# Patient Record
Sex: MALE | Race: WHITE | NOT HISPANIC OR LATINO | URBAN - METROPOLITAN AREA
[De-identification: names, ages, dates, MRNs, and addresses within clinical notes are randomized per-mention and may not be internally consistent; named-entity substitution may affect disease eponyms.]

---

## 2017-01-20 ENCOUNTER — OUTPATIENT (OUTPATIENT)
Dept: OUTPATIENT SERVICES | Facility: HOSPITAL | Age: 62
LOS: 1 days | End: 2017-01-20
Payer: COMMERCIAL

## 2017-01-20 ENCOUNTER — APPOINTMENT (OUTPATIENT)
Dept: CT IMAGING | Facility: CLINIC | Age: 62
End: 2017-01-20

## 2017-01-20 DIAGNOSIS — Z00.8 ENCOUNTER FOR OTHER GENERAL EXAMINATION: ICD-10-CM

## 2017-01-20 PROCEDURE — 74178 CT ABD&PLV WO CNTR FLWD CNTR: CPT

## 2017-01-20 PROCEDURE — 82565 ASSAY OF CREATININE: CPT

## 2017-05-24 ENCOUNTER — APPOINTMENT (OUTPATIENT)
Dept: ELECTROPHYSIOLOGY | Facility: CLINIC | Age: 62
End: 2017-05-24

## 2017-07-05 ENCOUNTER — RX RENEWAL (OUTPATIENT)
Age: 62
End: 2017-07-05

## 2017-08-07 ENCOUNTER — RX RENEWAL (OUTPATIENT)
Age: 62
End: 2017-08-07

## 2017-08-17 ENCOUNTER — RX RENEWAL (OUTPATIENT)
Age: 62
End: 2017-08-17

## 2017-08-23 ENCOUNTER — RX RENEWAL (OUTPATIENT)
Age: 62
End: 2017-08-23

## 2018-03-19 ENCOUNTER — RX RENEWAL (OUTPATIENT)
Age: 63
End: 2018-03-19

## 2019-08-01 RX ADMIN — Medication 650 MILLIGRAM(S): at 21:13

## 2019-08-16 RX ORDER — AZITHROMYCIN 500 MG/1
1 TABLET, FILM COATED ORAL
Qty: 0 | Refills: 0 | DISCHARGE
Start: 2019-08-16 | End: 2019-08-22

## 2019-08-18 ENCOUNTER — INPATIENT (INPATIENT)
Facility: HOSPITAL | Age: 64
LOS: 1 days | Discharge: HOME | DRG: 204 | End: 2019-08-20
Attending: GENERAL ACUTE CARE HOSPITAL | Admitting: GENERAL ACUTE CARE HOSPITAL
Payer: COMMERCIAL

## 2019-08-18 VITALS
WEIGHT: 149.03 LBS | HEIGHT: 66 IN | TEMPERATURE: 98 F | RESPIRATION RATE: 18 BRPM | OXYGEN SATURATION: 99 % | HEART RATE: 110 BPM | DIASTOLIC BLOOD PRESSURE: 80 MMHG | SYSTOLIC BLOOD PRESSURE: 137 MMHG

## 2019-08-18 DIAGNOSIS — I48.91 UNSPECIFIED ATRIAL FIBRILLATION: ICD-10-CM

## 2019-08-18 DIAGNOSIS — E11.9 TYPE 2 DIABETES MELLITUS WITHOUT COMPLICATIONS: ICD-10-CM

## 2019-08-18 DIAGNOSIS — I25.10 ATHEROSCLEROTIC HEART DISEASE OF NATIVE CORONARY ARTERY WITHOUT ANGINA PECTORIS: ICD-10-CM

## 2019-08-18 DIAGNOSIS — I10 ESSENTIAL (PRIMARY) HYPERTENSION: ICD-10-CM

## 2019-08-18 DIAGNOSIS — R06.02 SHORTNESS OF BREATH: ICD-10-CM

## 2019-08-18 DIAGNOSIS — E78.5 HYPERLIPIDEMIA, UNSPECIFIED: ICD-10-CM

## 2019-08-18 DIAGNOSIS — R05 COUGH: ICD-10-CM

## 2019-08-18 LAB
ALBUMIN SERPL ELPH-MCNC: 4.4 G/DL — SIGNIFICANT CHANGE UP (ref 3.3–5)
ALP SERPL-CCNC: 158 U/L — HIGH (ref 40–120)
ALT FLD-CCNC: 48 U/L — HIGH (ref 10–45)
ANION GAP SERPL CALC-SCNC: 12 MMOL/L — SIGNIFICANT CHANGE UP (ref 5–17)
AST SERPL-CCNC: 50 U/L — HIGH (ref 10–40)
BASE EXCESS BLDV CALC-SCNC: 0.6 MMOL/L — SIGNIFICANT CHANGE UP (ref -2–2)
BASOPHILS # BLD AUTO: 0.1 K/UL — SIGNIFICANT CHANGE UP (ref 0–0.2)
BASOPHILS NFR BLD AUTO: 0.4 % — SIGNIFICANT CHANGE UP (ref 0–2)
BILIRUB SERPL-MCNC: 3.2 MG/DL — HIGH (ref 0.2–1.2)
BUN SERPL-MCNC: 17 MG/DL — SIGNIFICANT CHANGE UP (ref 7–23)
CA-I SERPL-SCNC: 1.18 MMOL/L — SIGNIFICANT CHANGE UP (ref 1.12–1.3)
CALCIUM SERPL-MCNC: 10 MG/DL — SIGNIFICANT CHANGE UP (ref 8.4–10.5)
CHLORIDE BLDV-SCNC: 105 MMOL/L — SIGNIFICANT CHANGE UP (ref 96–108)
CHLORIDE SERPL-SCNC: 99 MMOL/L — SIGNIFICANT CHANGE UP (ref 96–108)
CO2 BLDV-SCNC: 28 MMOL/L — SIGNIFICANT CHANGE UP (ref 22–30)
CO2 SERPL-SCNC: 22 MMOL/L — SIGNIFICANT CHANGE UP (ref 22–31)
CREAT SERPL-MCNC: 1.23 MG/DL — SIGNIFICANT CHANGE UP (ref 0.5–1.3)
EOSINOPHIL # BLD AUTO: 0.4 K/UL — SIGNIFICANT CHANGE UP (ref 0–0.5)
EOSINOPHIL NFR BLD AUTO: 2.5 % — SIGNIFICANT CHANGE UP (ref 0–6)
GAS PNL BLDV: 128 MMOL/L — LOW (ref 135–145)
GAS PNL BLDV: SIGNIFICANT CHANGE UP
GAS PNL BLDV: SIGNIFICANT CHANGE UP
GLUCOSE BLDV-MCNC: 322 MG/DL — HIGH (ref 70–99)
GLUCOSE SERPL-MCNC: 328 MG/DL — HIGH (ref 70–99)
HCO3 BLDV-SCNC: 27 MMOL/L — SIGNIFICANT CHANGE UP (ref 21–29)
HCT VFR BLD CALC: 40.9 % — SIGNIFICANT CHANGE UP (ref 39–50)
HCT VFR BLDA CALC: 55 % — HIGH (ref 39–50)
HGB BLD CALC-MCNC: 17.9 G/DL — HIGH (ref 13–17)
HGB BLD-MCNC: 13.6 G/DL — SIGNIFICANT CHANGE UP (ref 13–17)
LACTATE BLDV-MCNC: 2.3 MMOL/L — HIGH (ref 0.7–2)
LYMPHOCYTES # BLD AUTO: 12.9 % — LOW (ref 13–44)
LYMPHOCYTES # BLD AUTO: 2.1 K/UL — SIGNIFICANT CHANGE UP (ref 1–3.3)
MCHC RBC-ENTMCNC: 30.5 PG — SIGNIFICANT CHANGE UP (ref 27–34)
MCHC RBC-ENTMCNC: 33.3 GM/DL — SIGNIFICANT CHANGE UP (ref 32–36)
MCV RBC AUTO: 91.5 FL — SIGNIFICANT CHANGE UP (ref 80–100)
MONOCYTES # BLD AUTO: 0.9 K/UL — SIGNIFICANT CHANGE UP (ref 0–0.9)
MONOCYTES NFR BLD AUTO: 5.6 % — SIGNIFICANT CHANGE UP (ref 2–14)
NEUTROPHILS # BLD AUTO: 12.5 K/UL — HIGH (ref 1.8–7.4)
NEUTROPHILS NFR BLD AUTO: 78.6 % — HIGH (ref 43–77)
PCO2 BLDV: 49 MMHG — SIGNIFICANT CHANGE UP (ref 35–50)
PH BLDV: 7.35 — SIGNIFICANT CHANGE UP (ref 7.35–7.45)
PLATELET # BLD AUTO: 315 K/UL — SIGNIFICANT CHANGE UP (ref 150–400)
PO2 BLDV: <20 MMHG — LOW (ref 25–45)
POTASSIUM BLDV-SCNC: 4.1 MMOL/L — SIGNIFICANT CHANGE UP (ref 3.5–5.3)
POTASSIUM SERPL-MCNC: 4.6 MMOL/L — SIGNIFICANT CHANGE UP (ref 3.5–5.3)
POTASSIUM SERPL-SCNC: 4.6 MMOL/L — SIGNIFICANT CHANGE UP (ref 3.5–5.3)
PROT SERPL-MCNC: 7.3 G/DL — SIGNIFICANT CHANGE UP (ref 6–8.3)
RAPID RVP RESULT: SIGNIFICANT CHANGE UP
RBC # BLD: 4.47 M/UL — SIGNIFICANT CHANGE UP (ref 4.2–5.8)
RBC # FLD: 12.8 % — SIGNIFICANT CHANGE UP (ref 10.3–14.5)
SAO2 % BLDV: 11 % — LOW (ref 67–88)
SODIUM SERPL-SCNC: 133 MMOL/L — LOW (ref 135–145)
TROPONIN T, HIGH SENSITIVITY RESULT: 22 NG/L — SIGNIFICANT CHANGE UP (ref 0–51)
WBC # BLD: 16 K/UL — HIGH (ref 3.8–10.5)
WBC # FLD AUTO: 16 K/UL — HIGH (ref 3.8–10.5)

## 2019-08-18 PROCEDURE — 99285 EMERGENCY DEPT VISIT HI MDM: CPT

## 2019-08-18 PROCEDURE — 93010 ELECTROCARDIOGRAM REPORT: CPT

## 2019-08-18 PROCEDURE — 99223 1ST HOSP IP/OBS HIGH 75: CPT

## 2019-08-18 PROCEDURE — 71046 X-RAY EXAM CHEST 2 VIEWS: CPT | Mod: 26

## 2019-08-18 RX ORDER — LISINOPRIL 2.5 MG/1
10 TABLET ORAL DAILY
Refills: 0 | Status: DISCONTINUED | OUTPATIENT
Start: 2019-08-18 | End: 2019-08-20

## 2019-08-18 RX ORDER — DEXTROSE 50 % IN WATER 50 %
25 SYRINGE (ML) INTRAVENOUS ONCE
Refills: 0 | Status: DISCONTINUED | OUTPATIENT
Start: 2019-08-18 | End: 2019-08-20

## 2019-08-18 RX ORDER — CODEINE SULFATE 60 MG/1
7.5 TABLET ORAL EVERY 6 HOURS
Refills: 0 | Status: DISCONTINUED | OUTPATIENT
Start: 2019-08-18 | End: 2019-08-20

## 2019-08-18 RX ORDER — GLUCAGON INJECTION, SOLUTION 0.5 MG/.1ML
1 INJECTION, SOLUTION SUBCUTANEOUS ONCE
Refills: 0 | Status: DISCONTINUED | OUTPATIENT
Start: 2019-08-18 | End: 2019-08-20

## 2019-08-18 RX ORDER — METOPROLOL TARTRATE 50 MG
25 TABLET ORAL
Refills: 0 | Status: DISCONTINUED | OUTPATIENT
Start: 2019-08-18 | End: 2019-08-20

## 2019-08-18 RX ORDER — DEXTROMETHORPHAN POLISTIREX 30 MG/5 ML
10 SUSPENSION, EXTENDED RELEASE 12 HR ORAL ONCE
Refills: 0 | Status: DISCONTINUED | OUTPATIENT
Start: 2019-08-18 | End: 2019-08-18

## 2019-08-18 RX ORDER — ACETAMINOPHEN 500 MG
650 TABLET ORAL EVERY 6 HOURS
Refills: 0 | Status: DISCONTINUED | OUTPATIENT
Start: 2019-08-18 | End: 2019-08-20

## 2019-08-18 RX ORDER — DEXTROSE 50 % IN WATER 50 %
12.5 SYRINGE (ML) INTRAVENOUS ONCE
Refills: 0 | Status: DISCONTINUED | OUTPATIENT
Start: 2019-08-18 | End: 2019-08-20

## 2019-08-18 RX ORDER — GUAIFENESIN/DEXTROMETHORPHAN 600MG-30MG
10 TABLET, EXTENDED RELEASE 12 HR ORAL ONCE
Refills: 0 | Status: COMPLETED | OUTPATIENT
Start: 2019-08-18 | End: 2019-08-18

## 2019-08-18 RX ORDER — TICAGRELOR 90 MG/1
60 TABLET ORAL EVERY 12 HOURS
Refills: 0 | Status: DISCONTINUED | OUTPATIENT
Start: 2019-08-19 | End: 2019-08-20

## 2019-08-18 RX ORDER — DEXTROSE 50 % IN WATER 50 %
15 SYRINGE (ML) INTRAVENOUS ONCE
Refills: 0 | Status: DISCONTINUED | OUTPATIENT
Start: 2019-08-18 | End: 2019-08-20

## 2019-08-18 RX ORDER — INSULIN GLARGINE 100 [IU]/ML
25 INJECTION, SOLUTION SUBCUTANEOUS AT BEDTIME
Refills: 0 | Status: DISCONTINUED | OUTPATIENT
Start: 2019-08-18 | End: 2019-08-20

## 2019-08-18 RX ORDER — ATORVASTATIN CALCIUM 80 MG/1
20 TABLET, FILM COATED ORAL AT BEDTIME
Refills: 0 | Status: DISCONTINUED | OUTPATIENT
Start: 2019-08-18 | End: 2019-08-20

## 2019-08-18 RX ORDER — INSULIN LISPRO 100/ML
VIAL (ML) SUBCUTANEOUS
Refills: 0 | Status: DISCONTINUED | OUTPATIENT
Start: 2019-08-18 | End: 2019-08-20

## 2019-08-18 RX ORDER — VANCOMYCIN HCL 1 G
1000 VIAL (EA) INTRAVENOUS ONCE
Refills: 0 | Status: COMPLETED | OUTPATIENT
Start: 2019-08-18 | End: 2019-08-18

## 2019-08-18 RX ORDER — CEFTRIAXONE 500 MG/1
1000 INJECTION, POWDER, FOR SOLUTION INTRAMUSCULAR; INTRAVENOUS ONCE
Refills: 0 | Status: COMPLETED | OUTPATIENT
Start: 2019-08-18 | End: 2019-08-18

## 2019-08-18 RX ADMIN — CEFTRIAXONE 100 MILLIGRAM(S): 500 INJECTION, POWDER, FOR SOLUTION INTRAMUSCULAR; INTRAVENOUS at 19:24

## 2019-08-18 RX ADMIN — INSULIN GLARGINE 25 UNIT(S): 100 INJECTION, SOLUTION SUBCUTANEOUS at 22:59

## 2019-08-18 RX ADMIN — Medication 100 MILLIGRAM(S): at 23:00

## 2019-08-18 RX ADMIN — ATORVASTATIN CALCIUM 20 MILLIGRAM(S): 80 TABLET, FILM COATED ORAL at 23:00

## 2019-08-18 RX ADMIN — Medication 250 MILLIGRAM(S): at 20:16

## 2019-08-18 RX ADMIN — Medication 10 MILLILITER(S): at 17:39

## 2019-08-18 NOTE — H&P ADULT - PROBLEM SELECTOR PLAN 2
-Symptomatic treatment with standing Benzonatate and PRN Robitussin and Codeine  -Hold ABX for now, send Procalcitonin and if elevated would consider Augmentin vs. Ceftriaxone  -Trend WBC and monitor fever curve -Symptomatic treatment with standing Benzonatate and PRN Robitussin and Codeine  -Hold ABX for now, send Procalcitonin and if elevated would consider Augmentin vs. Ceftriaxone  -Trend WBC and monitor fever curve  -Send RVP  -Don't suspect drug induced cough since patient is not taking his ACEI at home, if cough goes on for weeks would consider Pertussis, at this point it's most likely viral in etiology

## 2019-08-18 NOTE — ED PROVIDER NOTE - NS ED ROS FT
CONSTITUTIONAL: + fevers, no chills, no lightheadedness, no dizziness  Eyes: no visual changes  Nose: no nasal congestion  Mouth/Throat: no sore throat  CV: No chest pain, no palpitations  PULM: +cough, SOB  GI: No n/v/d, no abd pain  : no dysuria, no hematuria  NEURO: no headache, no focal weakness or numbness

## 2019-08-18 NOTE — ED PROVIDER NOTE - CLINICAL SUMMARY MEDICAL DECISION MAKING FREE TEXT BOX
63 y/o M with PMH of CAD, HTN, HLD, and new onset afib presenting for cough x1week that did not resolve with azithromycin. Concern for pneumonia vs viral etiology. New onset afib concerning, r/o ischemia

## 2019-08-18 NOTE — ED ADULT NURSE NOTE - PMH
CAD in native artery    Carotid Stenosis  left  Essential hypertension, hypertension with unspecified goal    Former smoker    Hyperlipidemia, unspecified hyperlipidemia type    Neuropathy  b/l feet  Type 2 diabetes mellitus

## 2019-08-18 NOTE — ED ADULT NURSE NOTE - OBJECTIVE STATEMENT
pt c/o "cough for the past week that has gotten progressively worse. I went to the doctor and they gave me abx to take and medication for the cough. last night the cough got worse and I wasn't even able to sleep. now when I go up stairs, I feel winded and SOB" pt denies any lightheadedness, dizziness, chest pain, SOB while at rest, abd. pain, n/v/d, numbness/tingling at present. pt has persistent cough at present, but able to speak in full complete sentences at present.

## 2019-08-18 NOTE — H&P ADULT - ASSESSMENT
64M with PMHx of CAD (post-three stents), HTN, HLD, T2DM (complicated by peripheral neuropathy and newly diagnosed atrial fibrillation presents to [facility] with cough for approximately five days. Patient did not improve with Zithromax and has no complaints which would indicate a systemic infection. Patient specifically denies shortness of breath at rest and any URI likely symptoms. Exam notable for clear lungs and normal O2 saturation on room air with no tachypnea. Patient does have a leukocytosis of 16 (neutrophilic pre-dominant) and two lateral troponins of 25 and 22. Patient's CXR showed no consolidation and EKG documented by ED physician to have new TWI in V4-V6. When interpreted by me, TWI are not very clear and his last EKG I could find in the system was from 2016. Patient admitted for possible ACS as opposed to cough.

## 2019-08-18 NOTE — H&P ADULT - NSICDXPASTMEDICALHX_GEN_ALL_CORE_FT
PAST MEDICAL HISTORY:  CAD in native artery     Essential hypertension, hypertension with unspecified goal     Former smoker     Hyperlipidemia, unspecified hyperlipidemia type     Type 2 diabetes mellitus

## 2019-08-18 NOTE — H&P ADULT - HISTORY OF PRESENT ILLNESS
64M with PMHx of CAD (post-three stents), HTN, HLD, T2DM (complicated by peripheral neuropathy and newly diagnosed atrial fibrillation presents to [facility] with cough for approximately five days. Patient stated the cough started approximately one week prior to admission when he was outside in the rain without an umbrella. Shortly there after he developed a productive cough (he describes coughing up phlegm which is whitish and periodically has brown specks). The cough has not improved since then and in fact worsened yesterday. He went to his PMD who gave him a course of Azithromycin which did not improve his cough. He has been taking Robitussin and possibly Tessalon Perles which may have slightly relieved his cough. Patient denies nausea, vomiting, fever, chills, sore throat, nasal congestion, rhinorrhea, shortness of breath at rest, sick contacts, dysuria, or diarrhea. He does endorse decreased exercise tolerance (can walk a few feet before he has to stop to catch his breath), but no chest pain during these events.     Patient admitted because of possible TWI in lateral leads (V4-V6) when compared to prior EKGs. Patient last had EKG on record in 2016 and the EKG reviewed by me today shows possible TWI, but not really well appreciated, but may be considered flattened. Patient has no active chest pain or diaphoresis. His last cardiac cath was in 2016 and per him was also when he had his last stent. He has been compliant with his medications including Brilinta. He recently stopped Aspirin after being started on Xarelto for atrial fibrillation. When seen by me in the ED patient states cough is much improved.

## 2019-08-18 NOTE — H&P ADULT - PROBLEM SELECTOR PLAN 3
-Reduce Glargine by 20% to 25 units qhs  -ISS with FS TID AC  -Hold home oral agents, restart on discharge  -Patient told he shouldn't take a Sulfonylurea such as Glimepiride due to redundancy and increase risk of hypoglycemic events

## 2019-08-18 NOTE — PROGRESS NOTE ADULT - SUBJECTIVE AND OBJECTIVE BOX
Patient is a 64y old  Male who presents with a chief complaint of Cough, EKG Changes? (18 Aug 2019 20:50)                                                               INTERVAL HPI/OVERNIGHT EVENTS:    REVIEW OF SYSTEMS:     CONSTITUTIONAL: No weakness, fevers or chills  EYES/ENT: No visual changes , no ear ache   NECK: No pain or stiffness  RESPIRATORY: No cough, wheezing,  No shortness of breath  CARDIOVASCULAR: No chest pain or palpitations  GASTROINTESTINAL: No abdominal pain  . No nausea, vomiting, or hematemesis; No diarrhea or constipation. No melena or hematochezia.  GENITOURINARY: No dysuria, frequency or hematuria  NEUROLOGICAL: No numbness or weakness  SKIN: No itching, burning, rashes, or lesions                                                                                                                                                                                                                                                                                 Medications:  MEDICATIONS  (STANDING):  atorvastatin 20 milliGRAM(s) Oral at bedtime  benzonatate 100 milliGRAM(s) Oral every 8 hours  dextrose 50% Injectable 12.5 Gram(s) IV Push once  dextrose 50% Injectable 25 Gram(s) IV Push once  dextrose 50% Injectable 25 Gram(s) IV Push once  insulin glargine Injectable (LANTUS) 25 Unit(s) SubCutaneous at bedtime  insulin lispro (HumaLOG) corrective regimen sliding scale   SubCutaneous three times a day before meals  lisinopril 10 milliGRAM(s) Oral daily  metoprolol tartrate 25 milliGRAM(s) Oral two times a day    MEDICATIONS  (PRN):  acetaminophen   Tablet .. 650 milliGRAM(s) Oral every 6 hours PRN Mild Pain (1 - 3), Moderate Pain (4 - 6)  codeine 7.5 milliGRAM(s) Oral every 6 hours PRN Cough  dextrose 40% Gel 15 Gram(s) Oral once PRN Blood Glucose LESS THAN 70 milliGRAM(s)/deciliter  glucagon  Injectable 1 milliGRAM(s) IntraMuscular once PRN Glucose LESS THAN 70 milligrams/deciliter  guaiFENesin    Syrup 200 milliGRAM(s) Oral every 6 hours PRN Cough       Allergies    No Known Allergies    Intolerances      Vital Signs Last 24 Hrs  T(C): 37.1 (18 Aug 2019 20:51), Max: 37.1 (18 Aug 2019 20:51)  T(F): 98.7 (18 Aug 2019 20:51), Max: 98.7 (18 Aug 2019 20:51)  HR: 98 (18 Aug 2019 20:51) (98 - 110)  BP: 143/78 (18 Aug 2019 20:51) (137/80 - 143/78)  BP(mean): --  RR: 20 (18 Aug 2019 20:51) (18 - 20)  SpO2: 96% (18 Aug 2019 20:51) (96% - 99%)  CAPILLARY BLOOD GLUCOSE          Physical Exam:    Daily Height in cm: 167.64 (18 Aug 2019 16:09)    Daily   General:  Well appearing, NAD, not cachetic  HEENT:  Nonicteric, PERRLA  CV:  RRR, S1S2   Lungs:  CTA B/L, no wheezes, rales, rhonchi  Abdomen:  Soft, non-tender, no distended, positive BS  Extremities:  2+ pulses, no c/c, no edema  Skin:  Warm and dry, no rashes  :  No moreno  Neuro:  AAOx3, non-focal, grossly intact                                                                                                                                                                                                                                                                                                LABS:                               13.6   16.0  )-----------( 315      ( 18 Aug 2019 17:23 )             40.9                      08-18    133<L>  |  99  |  17  ----------------------------<  328<H>  4.6   |  22  |  1.23    Ca    10.0      18 Aug 2019 17:23    TPro  7.3  /  Alb  4.4  /  TBili  3.2<H>  /  DBili  x   /  AST  50<H>  /  ALT  48<H>  /  AlkPhos  158<H>  08-18                       RADIOLOGY & ADDITIONAL TESTS         I personally reviewed: [  ]EKG   [  ]CXR    [  ] CT      A/P:         Discussed with :     Derek consultants' Notes   Time spent :

## 2019-08-18 NOTE — ED PROVIDER NOTE - ST/T WAVE
nonspecific ST and T wave changes seen on previous EKG nonspecific ST and T wave changes in lateral leads ST depression V4-6 1mm, new compared to prior.

## 2019-08-18 NOTE — H&P ADULT - PROBLEM SELECTOR PLAN 1
-Serial troponins, low pre-test probability for acute ACS  -Stat EKG if develops new chest pain  -Continue with Brilinta, Statin, Beta Blocker and ACEI  -Monitor on Telemetry

## 2019-08-18 NOTE — H&P ADULT - NSHPLABSRESULTS_GEN_ALL_CORE
CXR personally reviewed by me: no consolidation    EKG personally reviewed by me: atrial fibrillation, flat TW in V5 and V6, QTc 366

## 2019-08-18 NOTE — ED PROVIDER NOTE - ATTENDING CONTRIBUTION TO CARE
64yr M hx of htn, hl, cad s/p stent x3 p/w new onset afib as well as URI sx. had intermittent palpitations, and CLEARY but no cp. seen primary and started on metop and increased dose. the past few days developed productive cough, today finished a course of azithro and continues to have hacking cough and unable to go to sleep. had taken anti tussives with little relief. denies fever chills but was in contact with mother in law who was sick and is currently admitted in hospital.   denies recent travel.  exam notable for no leg swelling, clear lungs, unremarkable otherwise.   concern for viral vs complicated pna , as well as ischemia induced by pna vs acs.  will do labs, cxr, trop and hold off antibiotics for now.   likely admit for symptom management as well as serial troponins. 64yr M hx of htn, hl, cad s/p stent x3 p/w new onset afib as well as URI sx. had intermittent palpitations, and CLEARY but no cp. seen primary and started on metop and increased dose. the past few days developed productive cough, today finished a course of azithro and continues to have hacking cough and unable to go to sleep. had taken anti tussives with little relief. denies fever chills but was in contact with mother in law who was sick and is currently admitted in hospital.   denies recent travel.  exam notable for no leg swelling, clear lungs, unremarkable otherwise.   concern for viral vs complicated pna , as well as ischemia induced by pna vs acs.  will do labs, cxr, trop and hold off antibiotics for now.   likely admit for symptom management as well as serial troponin.

## 2019-08-18 NOTE — H&P ADULT - PROBLEM SELECTOR PLAN 6
-Rate control with Tartate 25 mg BID, would consider discharge on ToprolXL 50 daily  -Full dose anticoagulation with Xarelto 20

## 2019-08-18 NOTE — H&P ADULT - PROBLEM SELECTOR PLAN 4
-Start Rampiril equivalent of Lisinopril 10 mg  -Patient counseled on the importance of ACEI given his cardiac disease and DM

## 2019-08-18 NOTE — ED PROVIDER NOTE - OBJECTIVE STATEMENT
63 y/o M with PMH of CAD s/p stent x3, HTN, HLD and new onset afib presenting for cough for 1 week. Patient states cough began 1 week ago when he was out in the rain without umbrella or jacket. Also admits to dyspnea on exertion. Cough is productive with clear mucous. Patient denies fevers, chest pain, myalgias, rhinorrhea, headache, n/v/d, LE swelling. Patient went to PMD for symptoms and was started on azithromycin and benzonatate but has not had relief of symptoms.  Patient was seen by cardiology 2 weeks ago and was found to have new onset afib, has follow up appointment this week.

## 2019-08-18 NOTE — H&P ADULT - NSHPPHYSICALEXAM_GEN_ALL_CORE
VS:  Tcurrent: 98.5     BP: 137/80    HR: 110    RR: 18    O2Sat: 99% RA  Gen: male in NAD, appears comfortable, no diaphoresis  HEENT: NCAT, MMM, neck soft and supple, no erythema noted on back of pharynx  CV: S1/S2, no m/r/g  Resp: CTAB, no w/r/r  GI: normoactive BS, soft, NTND, no rebounding/guarding  Ext: No LE edema, extremities appear warm and well perfused   Neuro: AOx3, no focal deficits, CNII-XII grossly intact  Psych: No SI/HI/AVH, appropriate affect  Skin: no petechiae, ecchymosis or maculopapular rash noted

## 2019-08-19 LAB
ANION GAP SERPL CALC-SCNC: 9 MMOL/L — SIGNIFICANT CHANGE UP (ref 5–17)
BUN SERPL-MCNC: 13 MG/DL — SIGNIFICANT CHANGE UP (ref 7–23)
CALCIUM SERPL-MCNC: 9.3 MG/DL — SIGNIFICANT CHANGE UP (ref 8.4–10.5)
CHLORIDE SERPL-SCNC: 104 MMOL/L — SIGNIFICANT CHANGE UP (ref 96–108)
CO2 SERPL-SCNC: 24 MMOL/L — SIGNIFICANT CHANGE UP (ref 22–31)
CREAT SERPL-MCNC: 1.19 MG/DL — SIGNIFICANT CHANGE UP (ref 0.5–1.3)
GLUCOSE BLDC GLUCOMTR-MCNC: 123 MG/DL — HIGH (ref 70–99)
GLUCOSE BLDC GLUCOMTR-MCNC: 174 MG/DL — HIGH (ref 70–99)
GLUCOSE BLDC GLUCOMTR-MCNC: 213 MG/DL — HIGH (ref 70–99)
GLUCOSE BLDC GLUCOMTR-MCNC: 214 MG/DL — HIGH (ref 70–99)
GLUCOSE BLDC GLUCOMTR-MCNC: 316 MG/DL — HIGH (ref 70–99)
GLUCOSE BLDC GLUCOMTR-MCNC: 331 MG/DL — HIGH (ref 70–99)
GLUCOSE SERPL-MCNC: 192 MG/DL — HIGH (ref 70–99)
HCT VFR BLD CALC: 33.2 % — LOW (ref 39–50)
HCV AB S/CO SERPL IA: 0.05 S/CO — SIGNIFICANT CHANGE UP (ref 0–0.99)
HCV AB SERPL-IMP: SIGNIFICANT CHANGE UP
HGB BLD-MCNC: 11.2 G/DL — LOW (ref 13–17)
MCHC RBC-ENTMCNC: 30.7 PG — SIGNIFICANT CHANGE UP (ref 27–34)
MCHC RBC-ENTMCNC: 33.7 GM/DL — SIGNIFICANT CHANGE UP (ref 32–36)
MCV RBC AUTO: 91 FL — SIGNIFICANT CHANGE UP (ref 80–100)
PLATELET # BLD AUTO: 263 K/UL — SIGNIFICANT CHANGE UP (ref 150–400)
POTASSIUM SERPL-MCNC: 4.5 MMOL/L — SIGNIFICANT CHANGE UP (ref 3.5–5.3)
POTASSIUM SERPL-SCNC: 4.5 MMOL/L — SIGNIFICANT CHANGE UP (ref 3.5–5.3)
PROCALCITONIN SERPL-MCNC: 0.05 NG/ML — SIGNIFICANT CHANGE UP (ref 0.02–0.1)
RBC # BLD: 3.65 M/UL — LOW (ref 4.2–5.8)
RBC # FLD: 13.1 % — SIGNIFICANT CHANGE UP (ref 10.3–14.5)
SODIUM SERPL-SCNC: 137 MMOL/L — SIGNIFICANT CHANGE UP (ref 135–145)
TROPONIN T, HIGH SENSITIVITY RESULT: 25 NG/L — SIGNIFICANT CHANGE UP (ref 0–51)
WBC # BLD: 13.47 K/UL — HIGH (ref 3.8–10.5)
WBC # FLD AUTO: 13.47 K/UL — HIGH (ref 3.8–10.5)

## 2019-08-19 RX ORDER — FLUTICASONE PROPIONATE 50 MCG
1 SPRAY, SUSPENSION NASAL
Refills: 0 | Status: DISCONTINUED | OUTPATIENT
Start: 2019-08-19 | End: 2019-08-20

## 2019-08-19 RX ORDER — INSULIN LISPRO 100/ML
4 VIAL (ML) SUBCUTANEOUS ONCE
Refills: 0 | Status: COMPLETED | OUTPATIENT
Start: 2019-08-19 | End: 2019-08-19

## 2019-08-19 RX ORDER — RIVAROXABAN 15 MG-20MG
20 KIT ORAL
Refills: 0 | Status: DISCONTINUED | OUTPATIENT
Start: 2019-08-19 | End: 2019-08-20

## 2019-08-19 RX ORDER — PANTOPRAZOLE SODIUM 20 MG/1
40 TABLET, DELAYED RELEASE ORAL
Refills: 0 | Status: DISCONTINUED | OUTPATIENT
Start: 2019-08-19 | End: 2019-08-20

## 2019-08-19 RX ADMIN — TICAGRELOR 60 MILLIGRAM(S): 90 TABLET ORAL at 05:06

## 2019-08-19 RX ADMIN — CODEINE SULFATE 7.5 MILLIGRAM(S): 60 TABLET ORAL at 23:00

## 2019-08-19 RX ADMIN — Medication 4 UNIT(S): at 22:13

## 2019-08-19 RX ADMIN — Medication 1: at 12:14

## 2019-08-19 RX ADMIN — PANTOPRAZOLE SODIUM 40 MILLIGRAM(S): 20 TABLET, DELAYED RELEASE ORAL at 17:00

## 2019-08-19 RX ADMIN — Medication 1 SPRAY(S): at 17:00

## 2019-08-19 RX ADMIN — TICAGRELOR 60 MILLIGRAM(S): 90 TABLET ORAL at 17:23

## 2019-08-19 RX ADMIN — Medication 100 MILLIGRAM(S): at 05:07

## 2019-08-19 RX ADMIN — Medication 200 MILLIGRAM(S): at 11:44

## 2019-08-19 RX ADMIN — CODEINE SULFATE 7.5 MILLIGRAM(S): 60 TABLET ORAL at 22:13

## 2019-08-19 RX ADMIN — LISINOPRIL 10 MILLIGRAM(S): 2.5 TABLET ORAL at 05:06

## 2019-08-19 RX ADMIN — Medication 100 MILLIGRAM(S): at 15:01

## 2019-08-19 RX ADMIN — Medication 2: at 17:00

## 2019-08-19 RX ADMIN — ATORVASTATIN CALCIUM 20 MILLIGRAM(S): 80 TABLET, FILM COATED ORAL at 21:22

## 2019-08-19 RX ADMIN — RIVAROXABAN 20 MILLIGRAM(S): KIT at 17:00

## 2019-08-19 RX ADMIN — Medication 25 MILLIGRAM(S): at 17:00

## 2019-08-19 RX ADMIN — Medication 100 MILLIGRAM(S): at 21:42

## 2019-08-19 RX ADMIN — Medication 25 MILLIGRAM(S): at 05:06

## 2019-08-19 RX ADMIN — INSULIN GLARGINE 25 UNIT(S): 100 INJECTION, SOLUTION SUBCUTANEOUS at 21:22

## 2019-08-19 NOTE — CONSULT NOTE ADULT - SUBJECTIVE AND OBJECTIVE BOX
CHIEF COMPLAINT: AF, ashd    HPI:  64M known to our group, best to Dr Moon, last seen in office , history ashd s/p pci lad 2016 by Dr Dominguez, htn, hl, dm, af, admitted with pneumonia. No chest pain, +coughing. Has been maintained on brilinta and xarelto 20 mg daily, although he states that he has only been taking brilinta once a day. Troponin negative x 2. Admitted with severe cough, not responsive to out patient therapy by pcp Dr Kumar.      PAST MEDICAL & SURGICAL HISTORY:  Essential hypertension, hypertension with unspecified goal  Hyperlipidemia, unspecified hyperlipidemia type  Type 2 diabetes mellitus  CAD in native artery  Former smoker  s/p Carotid Endarterectomy: left      Allergies    No Known Allergies      Smoking Hx:  ETOH Hx:  Marital Status:  Occupational Hx:    FAMILY HISTORY:  Family history of heart disease: father  age 71      MEDICATIONS:  acetaminophen   Tablet .. 650 milliGRAM(s) Oral every 6 hours PRN  atorvastatin 20 milliGRAM(s) Oral at bedtime  benzonatate 100 milliGRAM(s) Oral every 8 hours  codeine 7.5 milliGRAM(s) Oral every 6 hours PRN  dextrose 40% Gel 15 Gram(s) Oral once PRN  dextrose 50% Injectable 12.5 Gram(s) IV Push once  dextrose 50% Injectable 25 Gram(s) IV Push once  dextrose 50% Injectable 25 Gram(s) IV Push once  glucagon  Injectable 1 milliGRAM(s) IntraMuscular once PRN  guaiFENesin    Syrup 200 milliGRAM(s) Oral every 6 hours PRN  insulin glargine Injectable (LANTUS) 25 Unit(s) SubCutaneous at bedtime  insulin lispro (HumaLOG) corrective regimen sliding scale   SubCutaneous three times a day before meals  lisinopril 10 milliGRAM(s) Oral daily  metoprolol tartrate 25 milliGRAM(s) Oral two times a day  ticagrelor 60 milliGRAM(s) Oral every 12 hours      REVIEW OF SYSTEMS:    CONSTITUTIONAL: No weakness, fevers or chills  EYES/ENT: No visual changes;  No vertigo or throat pain   NECK: No pain or stiffness  RESPIRATORY: +cough  CARDIOVASCULAR: No chest pain or palpitations  GASTROINTESTINAL: No abdominal or epigastric pain. No nausea, vomiting, or hematemesis; No diarrhea or constipation. No melena or hematochezia.  GENITOURINARY: No dysuria, frequency or hematuria  NEUROLOGICAL: No numbness or weakness  SKIN: No itching, burning, rashes, or lesions   All other review of systems is negative unless indicated above    Vital Signs Last 24 Hrs  T(C): 36.9 (19 Aug 2019 08:30), Max: 37.1 (18 Aug 2019 20:51)  T(F): 98.4 (19 Aug 2019 08:30), Max: 98.8 (19 Aug 2019 00:55)  HR: 96 (19 Aug 2019 08:30) (92 - 110)  BP: 130/86 (19 Aug 2019 08:30) (119/52 - 143/78)  BP(mean): --  RR: 18 (19 Aug 2019 08:30) (16 - 20)  SpO2: 100% (19 Aug 2019 08:30) (94% - 100%)    I&O's Summary      PHYSICAL EXAM:    Constitutional: NAD, awake and alert, well-developed  HEENT: PERR, EOMI  Neck: soft and supple, No LAD, No JVD  Respiratory: course bs bilateral  Cardiovascular: irregular normal s1s2  Extremities: No peripheral edema. No clubbing or cyanosis.      LABS: All Labs Reviewed:                        11.2   13.47 )-----------( 263      ( 19 Aug 2019 08:02 )             33.2                         13.6   16.0  )-----------( 315      ( 18 Aug 2019 17:23 )             40.9     19 Aug 2019 05:57    137    |  104    |  13     ----------------------------<  192    4.5     |  24     |  1.19   18 Aug 2019 17:23    133    |  99     |  17     ----------------------------<  328    4.6     |  22     |  1.23     Ca    9.3        19 Aug 2019 05:57  Ca    10.0       18 Aug 2019 17:23    TPro  7.3    /  Alb  4.4    /  TBili  3.2    /  DBili  x      /  AST  50     /  ALT  48     /  AlkPhos  158    18 Aug 2019 17:23      CARDIAC MARKERS:    EKG: AF 88, non specific st changes including t inversion v4-6 unchanged vs office ekg .

## 2019-08-19 NOTE — CONSULT NOTE ADULT - ASSESSMENT
#ASHD s/p elizabeth lad 2016  #HTN  #HL  #DM  #AF  #Pneumonia  Antibiotics as per hospital team.  Ekg unchanged vs office, troponin negative.  Continue brilinta bid and xarelto.

## 2019-08-19 NOTE — PROGRESS NOTE ADULT - SUBJECTIVE AND OBJECTIVE BOX
Social History:    Marital Status:   Occupation:   Lives with:     Substance Use :  Tobacco Usage:  (   ) never smoked   (   ) former smoker   (   ) current smoker  (     ) pack year  (        ) last tobacco use date  Alcohol Usage: Patient is a 64y old  Male who presents with a chief complaint of Cough, EKG Changes? (19 Aug 2019 18:15)                                                               INTERVAL HPI/OVERNIGHT EVENTS:    REVIEW OF SYSTEMS:     CONSTITUTIONAL: No weakness, fevers or chills  EYES/ENT: No visual changes , no ear ache   NECK: No pain or stiffness  RESPIRATORY:  still with cough however dry     no CP or palpitations but does have   CARDIOVASCULAR: No chest pain or palpitations  GASTROINTESTINAL: No abdominal pain  . No nausea, vomiting, or hematemesis; No diarrhea or constipation. No melena or hematochezia.  GENITOURINARY: No dysuria, frequency or hematuria  NEUROLOGICAL: No numbness or weakness                                                                                                                                                                                                                                                                                   Medications:  MEDICATIONS  (STANDING):  acetaminophen   Tablet .. 650 milliGRAM(s) Oral every 6 hours PRN  atorvastatin 20 milliGRAM(s) Oral at bedtime  benzonatate 100 milliGRAM(s) Oral every 8 hours  codeine 7.5 milliGRAM(s) Oral every 6 hours PRN  dextrose 40% Gel 15 Gram(s) Oral once PRN  dextrose 50% Injectable 12.5 Gram(s) IV Push once  dextrose 50% Injectable 25 Gram(s) IV Push once  dextrose 50% Injectable 25 Gram(s) IV Push once  glucagon  Injectable 1 milliGRAM(s) IntraMuscular once PRN  guaiFENesin    Syrup 200 milliGRAM(s) Oral every 6 hours PRN  insulin glargine Injectable (LANTUS) 25 Unit(s) SubCutaneous at bedtime  insulin lispro (HumaLOG) corrective regimen sliding scale   SubCutaneous three times a day before meals  lisinopril 10 milliGRAM(s) Oral daily  metoprolol tartrate 25 milliGRAM(s) Oral two times a day  ticagrelor 60 milliGRAM(s) Oral every 12 hours         Allergies    No Known Allergies    Intolerances      Vital Signs Last 24 Hrs  T(C): 36.9 (19 Aug 2019 08:30), Max: 37.1 (18 Aug 2019 20:51)  T(F): 98.4 (19 Aug 2019 08:30), Max: 98.8 (19 Aug 2019 00:55)  HR: 96 (19 Aug 2019 08:30) (92 - 110)  BP: 130/86 (19 Aug 2019 08:30) (119/52 - 143/78)  BP(mean): --  RR: 18 (19 Aug 2019 08:30) (16 - 20)  SpO2: 100% (19 Aug 2019 08:30) (94% - 100%)    Physical Exam:      General:  Well appearing, NAD, not cachetic  HEENT:  Nonicteric, PERRLA  CV:  irreg irreg , S1S2   Lungs:  CTA B/L, no wheezes, rales, rhonchi  Abdomen:  Soft, non-tender, no distended, positive BS  Extremities:  2+ pulses, no c/c, no edema  Skin:  Warm and dry, no rashes  :  No moreno  Neuro:  AAOx3, non-focal, grossly intact                                                                                                                                                                                                                                                                                                LABS:                               12.1   11.9  )-----------( 252      ( 20 Aug 2019 03:01 )             36.0                      08-20    138  |  105  |  11  ----------------------------<  77  4.0   |  26  |  1.37<H>    Ca    9.3      20 Aug 2019 03:01

## 2019-08-20 ENCOUNTER — TRANSCRIPTION ENCOUNTER (OUTPATIENT)
Age: 64
End: 2019-08-20

## 2019-08-20 VITALS
RESPIRATION RATE: 16 BRPM | OXYGEN SATURATION: 99 % | TEMPERATURE: 98 F | HEART RATE: 96 BPM | SYSTOLIC BLOOD PRESSURE: 142 MMHG | DIASTOLIC BLOOD PRESSURE: 87 MMHG

## 2019-08-20 LAB
ANION GAP SERPL CALC-SCNC: 7 MMOL/L — SIGNIFICANT CHANGE UP (ref 5–17)
BUN SERPL-MCNC: 11 MG/DL — SIGNIFICANT CHANGE UP (ref 7–23)
CALCIUM SERPL-MCNC: 9.3 MG/DL — SIGNIFICANT CHANGE UP (ref 8.4–10.5)
CHLORIDE SERPL-SCNC: 105 MMOL/L — SIGNIFICANT CHANGE UP (ref 96–108)
CO2 SERPL-SCNC: 26 MMOL/L — SIGNIFICANT CHANGE UP (ref 22–31)
CREAT SERPL-MCNC: 1.37 MG/DL — HIGH (ref 0.5–1.3)
GLUCOSE BLDC GLUCOMTR-MCNC: 101 MG/DL — HIGH (ref 70–99)
GLUCOSE BLDC GLUCOMTR-MCNC: 369 MG/DL — HIGH (ref 70–99)
GLUCOSE BLDC GLUCOMTR-MCNC: 390 MG/DL — HIGH (ref 70–99)
GLUCOSE BLDC GLUCOMTR-MCNC: 402 MG/DL — HIGH (ref 70–99)
GLUCOSE SERPL-MCNC: 77 MG/DL — SIGNIFICANT CHANGE UP (ref 70–99)
HBA1C BLD-MCNC: 8.7 % — HIGH (ref 4–5.6)
HCT VFR BLD CALC: 36 % — LOW (ref 39–50)
HGB BLD-MCNC: 12.1 G/DL — LOW (ref 13–17)
MCHC RBC-ENTMCNC: 30.6 PG — SIGNIFICANT CHANGE UP (ref 27–34)
MCHC RBC-ENTMCNC: 33.6 GM/DL — SIGNIFICANT CHANGE UP (ref 32–36)
MCV RBC AUTO: 91.3 FL — SIGNIFICANT CHANGE UP (ref 80–100)
PLATELET # BLD AUTO: 252 K/UL — SIGNIFICANT CHANGE UP (ref 150–400)
POTASSIUM SERPL-MCNC: 4 MMOL/L — SIGNIFICANT CHANGE UP (ref 3.5–5.3)
POTASSIUM SERPL-SCNC: 4 MMOL/L — SIGNIFICANT CHANGE UP (ref 3.5–5.3)
RBC # BLD: 3.94 M/UL — LOW (ref 4.2–5.8)
RBC # FLD: 12.4 % — SIGNIFICANT CHANGE UP (ref 10.3–14.5)
SODIUM SERPL-SCNC: 138 MMOL/L — SIGNIFICANT CHANGE UP (ref 135–145)
WBC # BLD: 11.9 K/UL — HIGH (ref 3.8–10.5)
WBC # FLD AUTO: 11.9 K/UL — HIGH (ref 3.8–10.5)

## 2019-08-20 PROCEDURE — 84295 ASSAY OF SERUM SODIUM: CPT

## 2019-08-20 PROCEDURE — 93005 ELECTROCARDIOGRAM TRACING: CPT

## 2019-08-20 PROCEDURE — 87581 M.PNEUMON DNA AMP PROBE: CPT

## 2019-08-20 PROCEDURE — 85027 COMPLETE CBC AUTOMATED: CPT

## 2019-08-20 PROCEDURE — 87486 CHLMYD PNEUM DNA AMP PROBE: CPT

## 2019-08-20 PROCEDURE — 99285 EMERGENCY DEPT VISIT HI MDM: CPT | Mod: 25

## 2019-08-20 PROCEDURE — 84145 PROCALCITONIN (PCT): CPT

## 2019-08-20 PROCEDURE — 87633 RESP VIRUS 12-25 TARGETS: CPT

## 2019-08-20 PROCEDURE — 86803 HEPATITIS C AB TEST: CPT

## 2019-08-20 PROCEDURE — 83036 HEMOGLOBIN GLYCOSYLATED A1C: CPT

## 2019-08-20 PROCEDURE — 80048 BASIC METABOLIC PNL TOTAL CA: CPT

## 2019-08-20 PROCEDURE — 84484 ASSAY OF TROPONIN QUANT: CPT

## 2019-08-20 PROCEDURE — 82947 ASSAY GLUCOSE BLOOD QUANT: CPT

## 2019-08-20 PROCEDURE — 87798 DETECT AGENT NOS DNA AMP: CPT

## 2019-08-20 PROCEDURE — 94640 AIRWAY INHALATION TREATMENT: CPT

## 2019-08-20 PROCEDURE — 96374 THER/PROPH/DIAG INJ IV PUSH: CPT

## 2019-08-20 PROCEDURE — 83605 ASSAY OF LACTIC ACID: CPT

## 2019-08-20 PROCEDURE — 71046 X-RAY EXAM CHEST 2 VIEWS: CPT

## 2019-08-20 PROCEDURE — 82435 ASSAY OF BLOOD CHLORIDE: CPT

## 2019-08-20 PROCEDURE — 82803 BLOOD GASES ANY COMBINATION: CPT

## 2019-08-20 PROCEDURE — G0378: CPT

## 2019-08-20 PROCEDURE — 80053 COMPREHEN METABOLIC PANEL: CPT

## 2019-08-20 PROCEDURE — 82962 GLUCOSE BLOOD TEST: CPT

## 2019-08-20 PROCEDURE — 84132 ASSAY OF SERUM POTASSIUM: CPT

## 2019-08-20 PROCEDURE — 85014 HEMATOCRIT: CPT

## 2019-08-20 PROCEDURE — 82330 ASSAY OF CALCIUM: CPT

## 2019-08-20 RX ORDER — CODEINE SULFATE 60 MG/1
7.5 TABLET ORAL
Qty: 1 | Refills: 0
Start: 2019-08-20 | End: 2019-08-26

## 2019-08-20 RX ORDER — INSULIN LISPRO 100/ML
7 VIAL (ML) SUBCUTANEOUS
Qty: 1 | Refills: 0
Start: 2019-08-20 | End: 2019-09-18

## 2019-08-20 RX ORDER — METOPROLOL TARTRATE 50 MG
2 TABLET ORAL
Qty: 0 | Refills: 3 | DISCHARGE
Start: 2019-08-20

## 2019-08-20 RX ORDER — TICAGRELOR 90 MG/1
1 TABLET ORAL
Qty: 60 | Refills: 11
Start: 2019-08-20

## 2019-08-20 RX ORDER — PANTOPRAZOLE SODIUM 20 MG/1
1 TABLET, DELAYED RELEASE ORAL
Qty: 0 | Refills: 0 | DISCHARGE
Start: 2019-08-20

## 2019-08-20 RX ORDER — INSULIN LISPRO 100/ML
7 VIAL (ML) SUBCUTANEOUS
Qty: 1 | Refills: 1
Start: 2019-08-20

## 2019-08-20 RX ORDER — METOPROLOL TARTRATE 50 MG
37.5 TABLET ORAL
Refills: 0 | Status: DISCONTINUED | OUTPATIENT
Start: 2019-08-20 | End: 2019-08-20

## 2019-08-20 RX ORDER — FLUTICASONE PROPIONATE 50 MCG
1 SPRAY, SUSPENSION NASAL
Qty: 0 | Refills: 0 | DISCHARGE
Start: 2019-08-20

## 2019-08-20 RX ORDER — METOPROLOL TARTRATE 50 MG
1.5 TABLET ORAL
Qty: 90 | Refills: 3
Start: 2019-08-20 | End: 2019-12-17

## 2019-08-20 RX ORDER — METOPROLOL TARTRATE 50 MG
2 TABLET ORAL
Qty: 0 | Refills: 0 | DISCHARGE

## 2019-08-20 RX ORDER — INSULIN GLARGINE 100 [IU]/ML
32 INJECTION, SOLUTION SUBCUTANEOUS
Qty: 0 | Refills: 0 | DISCHARGE

## 2019-08-20 RX ORDER — TICAGRELOR 90 MG/1
1 TABLET ORAL
Qty: 0 | Refills: 0 | DISCHARGE

## 2019-08-20 RX ORDER — SODIUM CHLORIDE 9 MG/ML
1000 INJECTION, SOLUTION INTRAVENOUS
Refills: 0 | Status: DISCONTINUED | OUTPATIENT
Start: 2019-08-20 | End: 2019-08-20

## 2019-08-20 RX ORDER — CODEINE SULFATE 60 MG/1
0.5 TABLET ORAL
Qty: 3.5 | Refills: 0
Start: 2019-08-20 | End: 2019-08-26

## 2019-08-20 RX ORDER — INSULIN LISPRO 100/ML
7 VIAL (ML) SUBCUTANEOUS
Refills: 0 | Status: DISCONTINUED | OUTPATIENT
Start: 2019-08-20 | End: 2019-08-20

## 2019-08-20 RX ADMIN — Medication 100 MILLIGRAM(S): at 05:32

## 2019-08-20 RX ADMIN — Medication 5: at 17:03

## 2019-08-20 RX ADMIN — TICAGRELOR 60 MILLIGRAM(S): 90 TABLET ORAL at 05:32

## 2019-08-20 RX ADMIN — PANTOPRAZOLE SODIUM 40 MILLIGRAM(S): 20 TABLET, DELAYED RELEASE ORAL at 17:01

## 2019-08-20 RX ADMIN — Medication 200 MILLIGRAM(S): at 08:51

## 2019-08-20 RX ADMIN — Medication 7 UNIT(S): at 17:03

## 2019-08-20 RX ADMIN — RIVAROXABAN 20 MILLIGRAM(S): KIT at 17:05

## 2019-08-20 RX ADMIN — Medication 1 SPRAY(S): at 07:36

## 2019-08-20 RX ADMIN — Medication 100 MILLIGRAM(S): at 13:08

## 2019-08-20 RX ADMIN — LISINOPRIL 10 MILLIGRAM(S): 2.5 TABLET ORAL at 05:32

## 2019-08-20 RX ADMIN — Medication 37.5 MILLIGRAM(S): at 17:02

## 2019-08-20 RX ADMIN — Medication 25 MILLIGRAM(S): at 05:32

## 2019-08-20 RX ADMIN — TICAGRELOR 60 MILLIGRAM(S): 90 TABLET ORAL at 17:01

## 2019-08-20 RX ADMIN — Medication 5: at 12:01

## 2019-08-20 RX ADMIN — Medication 1 SPRAY(S): at 17:04

## 2019-08-20 NOTE — DISCHARGE NOTE PROVIDER - CARE PROVIDER_API CALL
Alfred Moon (MD)  Cardiology; Internal Medicine  [address] [place] [address]  Phone: [phone]  Fax: [phone]  Follow Up Time:

## 2019-08-20 NOTE — CONSULT NOTE ADULT - PROBLEM SELECTOR RECOMMENDATION 9
Will continue Lantus 25 units at bed time.  Will start Humalog 7 units before each meal in addition to Humalog correction scale coverage.  Patient counseled for compliance with consistent low carb diet.  For discharge home suggest to continue Lantus 25 units at bedtime, glimeperide 4mg daily AM. Stop glimeperide at evening. Discussed plan with patient and primary team. Patient will follow up with his endocrinologist and get A1C.

## 2019-08-20 NOTE — CONSULT NOTE ADULT - ASSESSMENT
Assessment  DMT2: 64y Male with DM T2 with hyperglycemia, was on oral meds and insulin at home, now on insulin, blood sugars running high, no hypoglycemic episode,  eating meals,  non compliant with low carb diet.  CAD: on medications, no chest pain, stable, monitored.  HTN: Controlled,  on antihypertensive medications.          Myrna Rossi MD  Cell: [phone]  Office: [phone]

## 2019-08-20 NOTE — DISCHARGE NOTE PROVIDER - HOSPITAL COURSE
HPI:    64M with PMHx of CAD (post-three stents), HTN, HLD, T2DM (complicated by peripheral neuropathy and newly diagnosed atrial fibrillation presents to [facility] with cough for approximately five days. Patient stated the cough started approximately one week prior to admission when he was outside in the rain without an umbrella. Shortly there after he developed a productive cough (he describes coughing up phlegm which is whitish and periodically has brown specks). The cough has not improved since then and in fact worsened yesterday. He went to his PMD who gave him a course of Azithromycin which did not improve his cough. He has been taking Robitussin and possibly Tessalon Perles which may have slightly relieved his cough. Patient denies nausea, vomiting, fever, chills, sore throat, nasal congestion, rhinorrhea, shortness of breath at rest, sick contacts, dysuria, or diarrhea. He does endorse decreased exercise tolerance (can walk a few feet before he has to stop to catch his breath), but no chest pain during these events. EKG unchanged trop negative, continue current meds FS >300 Lantus 25 units at Bedtime & start humalog 7 units before each meal in addition to the humalog correction scale coverage

## 2019-08-20 NOTE — PROGRESS NOTE ADULT - SUBJECTIVE AND OBJECTIVE BOX
SUBJECTIVE: Asymptomatic  	  MEDICATIONS:  lisinopril 10 milliGRAM(s) Oral daily  metoprolol tartrate 25 milliGRAM(s) Oral two times a day  benzonatate 100 milliGRAM(s) Oral every 8 hours  guaiFENesin    Syrup 200 milliGRAM(s) Oral every 6 hours PRN  acetaminophen   Tablet .. 650 milliGRAM(s) Oral every 6 hours PRN  codeine 7.5 milliGRAM(s) Oral every 6 hours PRN  pantoprazole    Tablet 40 milliGRAM(s) Oral two times a day  atorvastatin 20 milliGRAM(s) Oral at bedtime  dextrose 40% Gel 15 Gram(s) Oral once PRN  dextrose 50% Injectable 12.5 Gram(s) IV Push once  dextrose 50% Injectable 25 Gram(s) IV Push once  dextrose 50% Injectable 25 Gram(s) IV Push once  glucagon  Injectable 1 milliGRAM(s) IntraMuscular once PRN  insulin glargine Injectable (LANTUS) 25 Unit(s) SubCutaneous at bedtime  insulin lispro (HumaLOG) corrective regimen sliding scale   SubCutaneous three times a day before meals  fluticasone propionate 50 MICROgram(s)/spray Nasal Spray 1 Spray(s) Both Nostrils two times a day  rivaroxaban 20 milliGRAM(s) Oral with dinner  ticagrelor 60 milliGRAM(s) Oral every 12 hours      REVIEW OF SYSTEMS:    CONSTITUTIONAL: No fever, weight loss, or fatigue  EYES: No eye pain, visual disturbances, or discharge  NECK: No pain or stiffness  RESPIRATORY: No cough, wheezing, chills or hemoptysis; No Shortness of Breath  CARDIOVASCULAR: No chest pain, palpitations, dizziness, or leg swelling  GASTROINTESTINAL: No abdominal or epigastric pain. No nausea, vomiting, or hematemesis; No diarrhea or constipation. No melena or hematochezia.  GENITOURINARY: No dysuria, frequency, hematuria, or incontinence  NEUROLOGICAL: No headaches, memory loss, loss of strength, numbness, or tremors  SKIN: No itching, burning, rashes, or lesions   LYMPH Nodes: No enlarged glands  MUSCULOSKELETAL: No joint pain or swelling; No muscle, back, or extremity pain  All other review of systems are negative.  	    PHYSICAL EXAM:  T(C): 37.1 (08-20-19 @ 10:57), Max: 37.2 (08-19-19 @ 21:28)  HR: 76 (08-20-19 @ 10:57) (76 - 104)  BP: 103/61 (08-20-19 @ 10:57) (103/61 - 147/81)  RR: 18 (08-20-19 @ 10:57) (16 - 19)  SpO2: 98% (08-20-19 @ 10:57) (97% - 100%)  Wt(kg): --  I&O's Summary    19 Aug 2019 07:01  -  20 Aug 2019 07:00  --------------------------------------------------------  IN: 1320 mL / OUT: 0 mL / NET: 1320 mL    20 Aug 2019 07:01  -  20 Aug 2019 11:45  --------------------------------------------------------  IN: 240 mL / OUT: 0 mL / NET: 240 mL          PHYSICAL EXAM    Appearance: Normal	  HEENT:   Normal oral mucosa, PERRL, EOMI	  NECK: Soft and supple, No LAD, No JVD  Cardiovascular: irregular normal s1s2  Respiratory: Lungs clear to auscultation	  Extremities: No clubbing, cyanosis or edema      TELEMETRY: 	  [id number]    LABS:	 	                     12.1   11.9  )-----------( 252      ( 20 Aug 2019 03:01 )             36.0     08-20    138  |  105  |  11  ----------------------------<  77  4.0   |  26  |  1.37<H>    Ca    9.3      20 Aug 2019 03:01    TPro  7.3  /  Alb  4.4  /  TBili  3.2<H>  /  DBili  x   /  AST  50<H>  /  ALT  48<H>  /  AlkPhos  158<H>  08-18

## 2019-08-20 NOTE — PROGRESS NOTE ADULT - SUBJECTIVE AND OBJECTIVE BOX
Patient is a 64y old  Male who presents with a chief complaint of Cough, EKG Changes? (20 Aug 2019 18:15)                                                               INTERVAL HPI/OVERNIGHT EVENTS:    REVIEW OF SYSTEMS:     CONSTITUTIONAL: No weakness, fevers or chills  EYES/ENT: No visual changes , no ear ache   NECK: No pain or stiffness  RESPIRATORY: improved cough   no CP   SOB on exertion   CARDIOVASCULAR: No chest pain or palpitations  GASTROINTESTINAL: No abdominal pain  . No nausea, vomiting, or hematemesis; No diarrhea or constipation. No melena or hematochezia.  GENITOURINARY: No dysuria, frequency or hematuria  NEUROLOGICAL: No numbness or weakness                                                                                                                                                                                                                                                                                Medications:  MEDICATIONS  (STANDING):  atorvastatin 20 milliGRAM(s) Oral at bedtime  benzonatate 100 milliGRAM(s) Oral every 8 hours  dextrose 5%. 1000 milliLiter(s) (50 mL/Hr) IV Continuous <Continuous>  dextrose 50% Injectable 12.5 Gram(s) IV Push once  dextrose 50% Injectable 25 Gram(s) IV Push once  dextrose 50% Injectable 25 Gram(s) IV Push once  fluticasone propionate 50 MICROgram(s)/spray Nasal Spray 1 Spray(s) Both Nostrils two times a day  insulin glargine Injectable (LANTUS) 25 Unit(s) SubCutaneous at bedtime  insulin lispro (HumaLOG) corrective regimen sliding scale   SubCutaneous three times a day before meals  insulin lispro Injectable (HumaLOG) 7 Unit(s) SubCutaneous three times a day before meals  lisinopril 10 milliGRAM(s) Oral daily  metoprolol tartrate 37.5 milliGRAM(s) Oral two times a day  pantoprazole    Tablet 40 milliGRAM(s) Oral two times a day  rivaroxaban 20 milliGRAM(s) Oral with dinner  ticagrelor 60 milliGRAM(s) Oral every 12 hours    MEDICATIONS  (PRN):  acetaminophen   Tablet .. 650 milliGRAM(s) Oral every 6 hours PRN Mild Pain (1 - 3), Moderate Pain (4 - 6)  codeine 7.5 milliGRAM(s) Oral every 6 hours PRN Cough  dextrose 40% Gel 15 Gram(s) Oral once PRN Blood Glucose LESS THAN 70 milliGRAM(s)/deciliter  glucagon  Injectable 1 milliGRAM(s) IntraMuscular once PRN Glucose LESS THAN 70 milligrams/deciliter  guaiFENesin    Syrup 200 milliGRAM(s) Oral every 6 hours PRN Cough       Allergies    No Known Allergies    Intolerances      Vital Signs Last 24 Hrs  T(C): 36.6 (20 Aug 2019 18:35), Max: 37.1 (20 Aug 2019 10:57)  T(F): 97.9 (20 Aug 2019 18:35), Max: 98.7 (20 Aug 2019 10:57)  HR: 96 (20 Aug 2019 18:35) (76 - 96)  BP: 142/87 (20 Aug 2019 18:35) (103/61 - 142/87)  BP(mean): --  RR: 16 (20 Aug 2019 18:35) (16 - 18)  SpO2: 99% (20 Aug 2019 18:35) (98% - 100%)  CAPILLARY BLOOD GLUCOSE      POCT Blood Glucose.: 390 mg/dL (20 Aug 2019 16:49)  POCT Blood Glucose.: 369 mg/dL (20 Aug 2019 11:17)  POCT Blood Glucose.: 402 mg/dL (20 Aug 2019 11:15)  POCT Blood Glucose.: 101 mg/dL (20 Aug 2019 07:15)      08-19 @ 07:01 - 08-20 @ 07:00  --------------------------------------------------------  IN: 1320 mL / OUT: 0 mL / NET: 1320 mL    08-20 @ 07:01 - 08-20 @ 21:38  --------------------------------------------------------  IN: 520 mL / OUT: 0 mL / NET: 520 mL      Physical Exam:    Daily     Daily   General:  Well appearing, NAD, not cachetic  HEENT:  Nonicteric, PERRLA  CV:  irreg irreg , S1S2   Lungs:  CTA B/L, no wheezes, rales, rhonchi  Abdomen:  Soft, non-tender, no distended, positive BS  Extremities:  2+ pulses, no c/c, no edema  Skin:  Warm and dry, no rashes  :  No moreno  Neuro:  AAOx3, non-focal, grossly intact                                                                                                                                                                                                                                                                                                LABS:                               12.1   11.9  )-----------( 252      ( 20 Aug 2019 03:01 )             36.0                      08-20    138  |  105  |  11  ----------------------------<  77  4.0   |  26  |  1.37<H>    Ca    9.3      20 Aug 2019 03:01

## 2019-08-20 NOTE — DISCHARGE NOTE NURSING/CASE MANAGEMENT/SOCIAL WORK - NSDCDPATPORTLINK_GEN_ALL_CORE
You can access the [facility] Patient Portal, offered by [facility], by registering with the following website: [email or web address]

## 2019-08-20 NOTE — CONSULT NOTE ADULT - SUBJECTIVE AND OBJECTIVE BOX
HPI:  64M with PMHx of CAD (post-three stents), HTN, HLD, T2DM (complicated by peripheral neuropathy and newly diagnosed atrial fibrillation presents to [facility] with cough for approximately five days. Patient stated the cough started approximately one week prior to admission when he was outside in the rain without an umbrella. Shortly there after he developed a productive cough (he describes coughing up phlegm which is whitish and periodically has brown specks). The cough has not improved since then and in fact worsened yesterday. He went to his PMD who gave him a course of Azithromycin which did not improve his cough. He has been taking Robitussin and possibly Tessalon Perles which may have slightly relieved his cough. Patient denies nausea, vomiting, fever, chills, sore throat, nasal congestion, rhinorrhea, shortness of breath at rest, sick contacts, dysuria, or diarrhea. He does endorse decreased exercise tolerance (can walk a few feet before he has to stop to catch his breath), but no chest pain during these events.     Patient admitted because of possible TWI in lateral leads (V4-V6) when compared to prior EKGs. Patient last had EKG on record in 2016 and the EKG reviewed by me today shows possible TWI, but not really well appreciated, but may be considered flattened. Patient has no active chest pain or diaphoresis. His last cardiac cath was in 2016 and per him was also when he had his last stent. He has been compliant with his medications including Brilinta. He recently stopped Aspirin after being started on Xarelto for atrial fibrillation. When seen by me in the ED patient states cough is much improved. (18 Aug 2019 20:50)  Patient has history of diabetes, on oral meds and on insulin at home, no recent hypoglycemic episodes, no polyuria polydipsia. Patient follows up with PCP for diabetes management.    PAST MEDICAL & SURGICAL HISTORY:  Essential hypertension, hypertension with unspecified goal  Hyperlipidemia, unspecified hyperlipidemia type  Type 2 diabetes mellitus  CAD in native artery  Former smoker  s/p Carotid Endarterectomy: left      FAMILY HISTORY:  Family history of heart disease: father  age 71      Social History:    Outpatient Medications:    MEDICATIONS  (STANDING):  atorvastatin 20 milliGRAM(s) Oral at bedtime  benzonatate 100 milliGRAM(s) Oral every 8 hours  dextrose 50% Injectable 12.5 Gram(s) IV Push once  dextrose 50% Injectable 25 Gram(s) IV Push once  dextrose 50% Injectable 25 Gram(s) IV Push once  fluticasone propionate 50 MICROgram(s)/spray Nasal Spray 1 Spray(s) Both Nostrils two times a day  insulin glargine Injectable (LANTUS) 25 Unit(s) SubCutaneous at bedtime  insulin lispro (HumaLOG) corrective regimen sliding scale   SubCutaneous three times a day before meals  lisinopril 10 milliGRAM(s) Oral daily  metoprolol tartrate 37.5 milliGRAM(s) Oral two times a day  pantoprazole    Tablet 40 milliGRAM(s) Oral two times a day  rivaroxaban 20 milliGRAM(s) Oral with dinner  ticagrelor 60 milliGRAM(s) Oral every 12 hours    MEDICATIONS  (PRN):  acetaminophen   Tablet .. 650 milliGRAM(s) Oral every 6 hours PRN Mild Pain (1 - 3), Moderate Pain (4 - 6)  codeine 7.5 milliGRAM(s) Oral every 6 hours PRN Cough  dextrose 40% Gel 15 Gram(s) Oral once PRN Blood Glucose LESS THAN 70 milliGRAM(s)/deciliter  glucagon  Injectable 1 milliGRAM(s) IntraMuscular once PRN Glucose LESS THAN 70 milligrams/deciliter  guaiFENesin    Syrup 200 milliGRAM(s) Oral every 6 hours PRN Cough      Allergies    No Known Allergies    Intolerances      Review of Systems:  Constitutional: No fever, no chills  Eyes: No blurry vision  Neuro: No tremors  HEENT: No pain, no neck swelling  Cardiovascular: No chest pain, no palpitations  Respiratory: Has SOB, no cough  GI: No nausea, vomiting, abdominal pain  : No dysuria  Skin: no rash  MSK: Has leg swelling.  Psych: no depression  Endocrine: no polyuria, polydipsia    ALL OTHER SYSTEMS REVIEWED AND NEGATIVE    UNABLE TO OBTAIN    PHYSICAL EXAM:  VITALS: T(C): 37.1 (19 @ 10:57)  T(F): 98.7 (19 @ 10:57), Max: 99 (19 @ 21:28)  HR: 96 (19 @ 15:01) (76 - 104)  BP: 128/73 (19 @ 15:01) (103/61 - 147/81)  RR:  (16 - 18)  SpO2:  (98% - 100%)  Wt(kg): --  GENERAL: NAD, well-groomed, well-developed  EYES: No proptosis, no lid lag  HEENT:  Atraumatic, Normocephalic  THYROID: Normal size, no palpable nodules  RESPIRATORY: Clear to auscultation bilaterally; No rales, rhonchi, wheezing  CARDIOVASCULAR: Si S2, No murmurs;  GI: Soft, non distended, normal bowel sounds  SKIN: Dry, intact, No rashes or lesions  MUSCULOSKELETAL: Has BL lower extremity edema.  NEURO:  no tremor, sensation decreased in feet BL,    POCT Blood Glucose.: 369 mg/dL (19 @ 11:17)  POCT Blood Glucose.: 402 mg/dL (19 @ 11:15)  POCT Blood Glucose.: 101 mg/dL (19 @ 07:15)  POCT Blood Glucose.: 316 mg/dL (19 @ 21:29)  POCT Blood Glucose.: 213 mg/dL (19 @ 16:37)  POCT Blood Glucose.: 174 mg/dL (19 @ 12:09)  POCT Blood Glucose.: 123 mg/dL (19 @ 08:18)  POCT Blood Glucose.: 214 mg/dL (19 @ 03:38)  POCT Blood Glucose.: 331 mg/dL (19 @ 22:56)                            12.1   11.9  )-----------( 252      ( 20 Aug 2019 03:01 )             36.0           138  |  105  |  11  ----------------------------<  77  4.0   |  26  |  1.37<H>    EGFR if : 64  EGFR if non : 55<L>    Ca    9.3          TPro  7.3  /  Alb  4.4  /  TBili  3.2<H>  /  DBili  x   /  AST  50<H>  /  ALT  48<H>  /  AlkPhos  158<H>        Thyroid Function Tests:              Radiology:

## 2019-08-20 NOTE — PROGRESS NOTE ADULT - ASSESSMENT
#ASHD s/p elizabeth lad 2016  #HTN  #HL  #DM  #AF  #Pneumonia  Antibiotics as per hospital team.  Ekg unchanged vs office, troponin negative.  Stable from cv perspective on current meds.
64M with PMHx of CAD (post-three stents), HTN, HLD, T2DM (complicated by peripheral neuropathy and newly diagnosed atrial fibrillation presents to [facility] with cough for approximately five days    1- afib : cont AC   discussed with Dr. Walton : still with afib RVR at rest and  on minimal exertion.. will increase BB and monitor... can be dced to f/u as o/p... ?? compliance per Dr. Walton.. encouraged compliance   pul congestion seen on CXR likley sec to mild congestion sec to afib with RVR .. pt with 97 % on RA on exertion.. no indication for lasix at this time     2- cough : doubt active infection given no infiltrate on CXR and nl procaklcitonin   will hold off on further abx and will start tessalon pearls / robitussin  add ppi for possible GERD   cough might be residual from recent URI   imrpoving at this time     3- HTN : monitor and cont meds     4- DM : uncontrolled   discussed with    appreciated input   encouraged compliance and cont meds per Dr. Rossi with changes as indicated     5- CAD  cotn o/p meds   insturcted to take brilinta twice daily     dc home   d/sw pt , wife , pa .. and Dr. Rossi
64M with PMHx of CAD (post-three stents), HTN, HLD, T2DM (complicated by peripheral neuropathy and newly diagnosed atrial fibrillation presents to [facility] with cough for approximately five days    1- afib : cont rate control   cont AC   ? YVES/DCCV    2- cough : doubt active infection given no infiltrate on CXR and nl procaklcitonin   will hold off on further abx and will start tessalon pearls / robitussin  add ppi for possible GERD   cough might be residual from recent URI     3- HTN : monitor and cont meds     4- DM : monitor FS   cont insulin   check A1c     5- CAD  cotn o/p meds

## 2019-08-20 NOTE — DISCHARGE NOTE PROVIDER - NSDCCPCAREPLAN_GEN_ALL_CORE_FT
PRINCIPAL DISCHARGE DIAGNOSIS  Diagnosis: CAD (coronary artery disease)  Assessment and Plan of Treatment: prevent further progression of CAD  continue current meds      SECONDARY DISCHARGE DIAGNOSES  Diagnosis: Atrial fibrillation  Assessment and Plan of Treatment: continue xareltto

## 2019-08-26 ENCOUNTER — EMERGENCY (EMERGENCY)
Facility: HOSPITAL | Age: 64
LOS: 1 days | Discharge: HOME | End: 2019-08-26
Attending: EMERGENCY MEDICINE
Payer: COMMERCIAL

## 2019-08-26 VITALS
RESPIRATION RATE: 19 BRPM | OXYGEN SATURATION: 100 % | TEMPERATURE: 98 F | DIASTOLIC BLOOD PRESSURE: 58 MMHG | HEART RATE: 74 BPM | SYSTOLIC BLOOD PRESSURE: 119 MMHG

## 2019-08-26 VITALS
WEIGHT: 147.93 LBS | RESPIRATION RATE: 19 BRPM | HEART RATE: 92 BPM | OXYGEN SATURATION: 100 % | HEIGHT: 66 IN | SYSTOLIC BLOOD PRESSURE: 122 MMHG | DIASTOLIC BLOOD PRESSURE: 78 MMHG | TEMPERATURE: 98 F

## 2019-08-26 LAB
ALBUMIN SERPL ELPH-MCNC: 4.2 G/DL — SIGNIFICANT CHANGE UP (ref 3.3–5)
ALP SERPL-CCNC: 155 U/L — HIGH (ref 40–120)
ALT FLD-CCNC: 35 U/L — SIGNIFICANT CHANGE UP (ref 10–45)
ANION GAP SERPL CALC-SCNC: 13 MMOL/L — SIGNIFICANT CHANGE UP (ref 5–17)
APTT BLD: 58.7 SEC — HIGH (ref 27.5–36.3)
AST SERPL-CCNC: 32 U/L — SIGNIFICANT CHANGE UP (ref 10–40)
BASOPHILS # BLD AUTO: 0.1 K/UL — SIGNIFICANT CHANGE UP (ref 0–0.2)
BASOPHILS NFR BLD AUTO: 0.6 % — SIGNIFICANT CHANGE UP (ref 0–2)
BILIRUB SERPL-MCNC: 1.5 MG/DL — HIGH (ref 0.2–1.2)
BUN SERPL-MCNC: 19 MG/DL — SIGNIFICANT CHANGE UP (ref 7–23)
CALCIUM SERPL-MCNC: 9.7 MG/DL — SIGNIFICANT CHANGE UP (ref 8.4–10.5)
CHLORIDE SERPL-SCNC: 98 MMOL/L — SIGNIFICANT CHANGE UP (ref 96–108)
CO2 SERPL-SCNC: 24 MMOL/L — SIGNIFICANT CHANGE UP (ref 22–31)
CREAT SERPL-MCNC: 1.44 MG/DL — HIGH (ref 0.5–1.3)
EOSINOPHIL # BLD AUTO: 0.5 K/UL — SIGNIFICANT CHANGE UP (ref 0–0.5)
EOSINOPHIL NFR BLD AUTO: 3.2 % — SIGNIFICANT CHANGE UP (ref 0–6)
GLUCOSE SERPL-MCNC: 329 MG/DL — HIGH (ref 70–99)
HCT VFR BLD CALC: 39.1 % — SIGNIFICANT CHANGE UP (ref 39–50)
HGB BLD-MCNC: 12.8 G/DL — LOW (ref 13–17)
INR BLD: 4.49 RATIO — HIGH (ref 0.88–1.16)
LYMPHOCYTES # BLD AUTO: 1.3 K/UL — SIGNIFICANT CHANGE UP (ref 1–3.3)
LYMPHOCYTES # BLD AUTO: 9 % — LOW (ref 13–44)
MCHC RBC-ENTMCNC: 30 PG — SIGNIFICANT CHANGE UP (ref 27–34)
MCHC RBC-ENTMCNC: 32.8 GM/DL — SIGNIFICANT CHANGE UP (ref 32–36)
MCV RBC AUTO: 91.4 FL — SIGNIFICANT CHANGE UP (ref 80–100)
MONOCYTES # BLD AUTO: 0.9 K/UL — SIGNIFICANT CHANGE UP (ref 0–0.9)
MONOCYTES NFR BLD AUTO: 6.3 % — SIGNIFICANT CHANGE UP (ref 2–14)
NEUTROPHILS # BLD AUTO: 11.8 K/UL — HIGH (ref 1.8–7.4)
NEUTROPHILS NFR BLD AUTO: 80.8 % — HIGH (ref 43–77)
PLATELET # BLD AUTO: 421 K/UL — HIGH (ref 150–400)
POTASSIUM SERPL-MCNC: 4.9 MMOL/L — SIGNIFICANT CHANGE UP (ref 3.5–5.3)
POTASSIUM SERPL-SCNC: 4.9 MMOL/L — SIGNIFICANT CHANGE UP (ref 3.5–5.3)
PROT SERPL-MCNC: 7.2 G/DL — SIGNIFICANT CHANGE UP (ref 6–8.3)
PROTHROM AB SERPL-ACNC: 54.1 SEC — HIGH (ref 10–12.9)
RBC # BLD: 4.27 M/UL — SIGNIFICANT CHANGE UP (ref 4.2–5.8)
RBC # FLD: 12.3 % — SIGNIFICANT CHANGE UP (ref 10.3–14.5)
SODIUM SERPL-SCNC: 135 MMOL/L — SIGNIFICANT CHANGE UP (ref 135–145)
WBC # BLD: 14.6 K/UL — HIGH (ref 3.8–10.5)
WBC # FLD AUTO: 14.6 K/UL — HIGH (ref 3.8–10.5)

## 2019-08-26 PROCEDURE — 93971 EXTREMITY STUDY: CPT | Mod: 26

## 2019-08-26 PROCEDURE — 93971 EXTREMITY STUDY: CPT

## 2019-08-26 PROCEDURE — 99284 EMERGENCY DEPT VISIT MOD MDM: CPT | Mod: 25

## 2019-08-26 PROCEDURE — 99284 EMERGENCY DEPT VISIT MOD MDM: CPT

## 2019-08-26 PROCEDURE — 85610 PROTHROMBIN TIME: CPT

## 2019-08-26 PROCEDURE — 85027 COMPLETE CBC AUTOMATED: CPT

## 2019-08-26 PROCEDURE — 73610 X-RAY EXAM OF ANKLE: CPT

## 2019-08-26 PROCEDURE — 80053 COMPREHEN METABOLIC PANEL: CPT

## 2019-08-26 PROCEDURE — 73610 X-RAY EXAM OF ANKLE: CPT | Mod: 26,RT

## 2019-08-26 PROCEDURE — 85730 THROMBOPLASTIN TIME PARTIAL: CPT

## 2019-08-26 RX ORDER — ACETAMINOPHEN 500 MG
975 TABLET ORAL ONCE
Refills: 0 | Status: COMPLETED | OUTPATIENT
Start: 2019-08-26 | End: 2019-08-26

## 2019-08-26 RX ORDER — CEPHALEXIN 500 MG
500 CAPSULE ORAL ONCE
Refills: 0 | Status: COMPLETED | OUTPATIENT
Start: 2019-08-26 | End: 2019-08-26

## 2019-08-26 RX ORDER — CEPHALEXIN 500 MG
1 CAPSULE ORAL
Qty: 28 | Refills: 0
Start: 2019-08-26 | End: 2019-09-01

## 2019-08-26 RX ADMIN — Medication 975 MILLIGRAM(S): at 10:32

## 2019-08-26 RX ADMIN — Medication 500 MILLIGRAM(S): at 10:31

## 2019-08-26 NOTE — ED ADULT NURSE NOTE - OBJECTIVE STATEMENT
63 yo M C/o R leg redness and swelling. Effected site tender to touch.   PMH HLD, DM type 2. New onset Afib last week in which pt was admitted for.   IV line placed provider at bedside. No fever no chill.

## 2019-08-26 NOTE — ED PROVIDER NOTE - PMH
CAD in native artery    Essential hypertension, hypertension with unspecified goal    Former smoker    Hyperlipidemia, unspecified hyperlipidemia type    Type 2 diabetes mellitus

## 2019-08-26 NOTE — ED PROVIDER NOTE - NS ED ROS FT
ROS: denies HA, weakness, dizziness, fevers/chills, nausea/vomiting, chest pain, SOB, diaphoresis, abdominal pain, diarrhea, neuro deficits, dysuria/hematuria, rash    +R ankle swelling

## 2019-08-26 NOTE — ED PROVIDER NOTE - CLINICAL SUMMARY MEDICAL DECISION MAKING FREE TEXT BOX
Attending MD Santacruz: 64M with afib on Xarelto, DM, CAD presenting with right medial ankle pain, no trauma. Exam reveals localized erythema, warmth and mild ttp to right medial ankle and foot with associated 2+ edema. Likely localized mild cellulitis by exam but must exclude DVT given recent hospitalization, plan for US, labs, screening XR ankle and PO abx. Patient without systemic signs or symptoms, well appearing so likely will be appropriate for PO abx, no associated open wounds to raise suspicion for MRSA so will not empirically include coverage for this, nonpurulent cellulitis

## 2019-08-26 NOTE — ED PROVIDER NOTE - NSFOLLOWUPINSTRUCTIONS_ED_ALL_ED_FT
You were seen in the ER for cellulitis.  DVT study does not show signs of clots in legs.  Please take keflex every 6 hours for 7 days.  Return to ER for life threatening signs or symptoms.  Follow up with your doctor.

## 2019-08-26 NOTE — ED PROVIDER NOTE - PROGRESS NOTE DETAILS
Pt in XR, pain over the R ankle. Pt stable, US does not show any signs of DVT. Will DC with f/u PMD with keflex.

## 2019-08-26 NOTE — ED PROVIDER NOTE - ATTENDING CONTRIBUTION TO CARE
Attending MD Santacruz:  I personally have seen and examined this patient.  Resident note reviewed and agree on plan of care and except where noted.  See HPI, PE, and MDM for details.       Attending MD Santacruz:    Gen: NAD, oriented x 3   Neck: supple, no swelling, trachea midline  CV: heart irregularly irregular, no m/r/g  Resp: CTAB, breathing comfortably  Abd: soft, NT, ND  Extremities: extremities warm to the touch, palpable DP pulses b/l, localized erythema to right medial ankle and foot with associated warmth, mild ttp, no crepitus, 2+ pitting edema of right foot and ankle, no calf ttp or swelling b/l  Msk: no extremity deformities or bony tenderness  Pysch: appropriate affect    Neuro: moves all extremities spontaneously, no gross motor or sensory deficits

## 2019-08-26 NOTE — ED PROVIDER NOTE - PHYSICAL EXAMINATION
Gen: NAD  Head: NCAT  HEENT: PERRL, MMM, normal conjunctiva, anicteric, neck supple  Lung: CTAB, no adventitious sounds  CV: RRR, no murmurs, rubs or gallops  Abd: soft, NTND, no rebound or guarding, no CVAT  MSK: excoriations over the shins bilaterally; Skin overlying R medial malleolus is TTP, red with mild induration, area ~2cm in largest diameter  Neuro: No focal neurologic deficits. CN II-XII grossly intact. 5/5 strength and normal sensation in all extremities.  Psych: normal mood and affect

## 2019-08-26 NOTE — ED ADULT NURSE NOTE - NSIMPLEMENTINTERV_GEN_ALL_ED
Implemented All Fall Risk Interventions:  [place] to call system. Call bell, personal items and telephone within reach. Instruct patient to call for assistance. Room bathroom lighting operational. Non-slip footwear when patient is off stretcher. Physically safe environment: no spills, clutter or unnecessary equipment. Stretcher in lowest position, wheels locked, appropriate side rails in place. Provide visual cue, wrist band, yellow gown, etc. Monitor gait and stability. Monitor for mental status changes and reorient to person, place, and time. Review medications for side effects contributing to fall risk. Reinforce activity limits and safety measures with patient and family.

## 2019-08-26 NOTE — ED PROVIDER NOTE - OBJECTIVE STATEMENT
65yo M with hx of CAD s/p 3 stents, newly dx Afib 63yo M with hx of CAD s/p 3 stents, newly dx Afib possible pulmonary edema presents with swelling of R medial malleolus for 5 days. Was recently hospitalized in the hospital for Afib / pulmonary edema and was given "water pills." Pain is getting worse, not taking any antibiotics currently although he was on abx transiently for possible dx of pneumonia last week. No hx of gout.

## 2019-08-26 NOTE — ED PROVIDER NOTE - NS_EDPROVIDERDISPOUSERTYPE_ED_A_ED
X Size Of Lesion In Cm (Optional): 0 Detail Level: Zone Detail Level: Simple Attending Attestation (For Attendings USE Only)...

## 2019-08-29 ENCOUNTER — INPATIENT (INPATIENT)
Facility: HOSPITAL | Age: 64
LOS: 12 days | Discharge: HOME | DRG: 872 | End: 2019-09-11
Attending: GENERAL ACUTE CARE HOSPITAL | Admitting: GENERAL ACUTE CARE HOSPITAL
Payer: COMMERCIAL

## 2019-08-29 VITALS
OXYGEN SATURATION: 100 % | SYSTOLIC BLOOD PRESSURE: 98 MMHG | HEIGHT: 66 IN | TEMPERATURE: 98 F | DIASTOLIC BLOOD PRESSURE: 61 MMHG | HEART RATE: 101 BPM | WEIGHT: 147.93 LBS | RESPIRATION RATE: 18 BRPM

## 2019-08-29 DIAGNOSIS — L03.115 CELLULITIS OF RIGHT LOWER LIMB: ICD-10-CM

## 2019-08-29 LAB
ALBUMIN SERPL ELPH-MCNC: 4.1 G/DL — SIGNIFICANT CHANGE UP (ref 3.3–5)
ALP SERPL-CCNC: 154 U/L — HIGH (ref 40–120)
ALT FLD-CCNC: 24 U/L — SIGNIFICANT CHANGE UP (ref 10–45)
ANION GAP SERPL CALC-SCNC: 17 MMOL/L — SIGNIFICANT CHANGE UP (ref 5–17)
AST SERPL-CCNC: 24 U/L — SIGNIFICANT CHANGE UP (ref 10–40)
B-OH-BUTYR SERPL-SCNC: 0.1 MMOL/L — SIGNIFICANT CHANGE UP
BASOPHILS # BLD AUTO: 0.1 K/UL — SIGNIFICANT CHANGE UP (ref 0–0.2)
BASOPHILS NFR BLD AUTO: 0.5 % — SIGNIFICANT CHANGE UP (ref 0–2)
BILIRUB SERPL-MCNC: 1.6 MG/DL — HIGH (ref 0.2–1.2)
BUN SERPL-MCNC: 25 MG/DL — HIGH (ref 7–23)
CALCIUM SERPL-MCNC: 9.5 MG/DL — SIGNIFICANT CHANGE UP (ref 8.4–10.5)
CHLORIDE SERPL-SCNC: 88 MMOL/L — LOW (ref 96–108)
CO2 SERPL-SCNC: 25 MMOL/L — SIGNIFICANT CHANGE UP (ref 22–31)
CREAT SERPL-MCNC: 1.71 MG/DL — HIGH (ref 0.5–1.3)
EOSINOPHIL # BLD AUTO: 0.3 K/UL — SIGNIFICANT CHANGE UP (ref 0–0.5)
EOSINOPHIL NFR BLD AUTO: 2.6 % — SIGNIFICANT CHANGE UP (ref 0–6)
ERYTHROCYTE [SEDIMENTATION RATE] IN BLOOD: 33 MM/HR — HIGH (ref 0–20)
GAS PNL BLDV: SIGNIFICANT CHANGE UP
GLUCOSE BLDC GLUCOMTR-MCNC: 102 MG/DL — HIGH (ref 70–99)
GLUCOSE BLDC GLUCOMTR-MCNC: 163 MG/DL — HIGH (ref 70–99)
GLUCOSE BLDC GLUCOMTR-MCNC: 513 MG/DL — CRITICAL HIGH (ref 70–99)
GLUCOSE BLDC GLUCOMTR-MCNC: >600 MG/DL — CRITICAL HIGH (ref 70–99)
GLUCOSE BLDC GLUCOMTR-MCNC: >600 MG/DL — CRITICAL HIGH (ref 70–99)
GLUCOSE SERPL-MCNC: 607 MG/DL — CRITICAL HIGH (ref 70–99)
HCT VFR BLD CALC: 38.6 % — LOW (ref 39–50)
HGB BLD-MCNC: 12.9 G/DL — LOW (ref 13–17)
LYMPHOCYTES # BLD AUTO: 1.4 K/UL — SIGNIFICANT CHANGE UP (ref 1–3.3)
LYMPHOCYTES # BLD AUTO: 10.9 % — LOW (ref 13–44)
MCHC RBC-ENTMCNC: 30.5 PG — SIGNIFICANT CHANGE UP (ref 27–34)
MCHC RBC-ENTMCNC: 33.4 GM/DL — SIGNIFICANT CHANGE UP (ref 32–36)
MCV RBC AUTO: 91.2 FL — SIGNIFICANT CHANGE UP (ref 80–100)
MONOCYTES # BLD AUTO: 1.3 K/UL — HIGH (ref 0–0.9)
MONOCYTES NFR BLD AUTO: 10.5 % — SIGNIFICANT CHANGE UP (ref 2–14)
NEUTROPHILS # BLD AUTO: 9.6 K/UL — HIGH (ref 1.8–7.4)
NEUTROPHILS NFR BLD AUTO: 75.5 % — SIGNIFICANT CHANGE UP (ref 43–77)
PLATELET # BLD AUTO: 404 K/UL — HIGH (ref 150–400)
POTASSIUM SERPL-MCNC: 4.4 MMOL/L — SIGNIFICANT CHANGE UP (ref 3.5–5.3)
POTASSIUM SERPL-SCNC: 4.4 MMOL/L — SIGNIFICANT CHANGE UP (ref 3.5–5.3)
PROT SERPL-MCNC: 7 G/DL — SIGNIFICANT CHANGE UP (ref 6–8.3)
RBC # BLD: 4.23 M/UL — SIGNIFICANT CHANGE UP (ref 4.2–5.8)
RBC # FLD: 12.4 % — SIGNIFICANT CHANGE UP (ref 10.3–14.5)
SODIUM SERPL-SCNC: 130 MMOL/L — LOW (ref 135–145)
WBC # BLD: 12.8 K/UL — HIGH (ref 3.8–10.5)
WBC # FLD AUTO: 12.8 K/UL — HIGH (ref 3.8–10.5)

## 2019-08-29 PROCEDURE — 99285 EMERGENCY DEPT VISIT HI MDM: CPT

## 2019-08-29 PROCEDURE — 71046 X-RAY EXAM CHEST 2 VIEWS: CPT | Mod: 26

## 2019-08-29 RX ORDER — SODIUM CHLORIDE 9 MG/ML
1000 INJECTION, SOLUTION INTRAVENOUS
Refills: 0 | Status: DISCONTINUED | OUTPATIENT
Start: 2019-08-29 | End: 2019-09-11

## 2019-08-29 RX ORDER — SODIUM CHLORIDE 9 MG/ML
1000 INJECTION INTRAMUSCULAR; INTRAVENOUS; SUBCUTANEOUS ONCE
Refills: 0 | Status: COMPLETED | OUTPATIENT
Start: 2019-08-29 | End: 2019-08-29

## 2019-08-29 RX ORDER — BECLOMETHASONE DIPROPIONATE 40 UG/1
2 AEROSOL, METERED RESPIRATORY (INHALATION)
Qty: 0 | Refills: 0 | DISCHARGE

## 2019-08-29 RX ORDER — CEFAZOLIN SODIUM 1 G
1000 VIAL (EA) INJECTION EVERY 8 HOURS
Refills: 0 | Status: DISCONTINUED | OUTPATIENT
Start: 2019-08-29 | End: 2019-09-01

## 2019-08-29 RX ORDER — DEXTROSE 50 % IN WATER 50 %
12.5 SYRINGE (ML) INTRAVENOUS ONCE
Refills: 0 | Status: DISCONTINUED | OUTPATIENT
Start: 2019-08-29 | End: 2019-09-11

## 2019-08-29 RX ORDER — DEXTROSE 50 % IN WATER 50 %
25 SYRINGE (ML) INTRAVENOUS ONCE
Refills: 0 | Status: DISCONTINUED | OUTPATIENT
Start: 2019-08-29 | End: 2019-09-11

## 2019-08-29 RX ORDER — ATORVASTATIN CALCIUM 80 MG/1
80 TABLET, FILM COATED ORAL AT BEDTIME
Refills: 0 | Status: DISCONTINUED | OUTPATIENT
Start: 2019-08-29 | End: 2019-09-11

## 2019-08-29 RX ORDER — INSULIN GLARGINE 100 [IU]/ML
28 INJECTION, SOLUTION SUBCUTANEOUS
Qty: 0 | Refills: 0 | DISCHARGE

## 2019-08-29 RX ORDER — INSULIN GLARGINE 100 [IU]/ML
25 INJECTION, SOLUTION SUBCUTANEOUS
Qty: 0 | Refills: 0 | DISCHARGE

## 2019-08-29 RX ORDER — METOPROLOL TARTRATE 50 MG
25 TABLET ORAL
Refills: 0 | Status: DISCONTINUED | OUTPATIENT
Start: 2019-08-29 | End: 2019-09-03

## 2019-08-29 RX ORDER — INSULIN LISPRO 100/ML
VIAL (ML) SUBCUTANEOUS AT BEDTIME
Refills: 0 | Status: DISCONTINUED | OUTPATIENT
Start: 2019-08-29 | End: 2019-09-11

## 2019-08-29 RX ORDER — AMLODIPINE BESYLATE 2.5 MG/1
5 TABLET ORAL DAILY
Refills: 0 | Status: DISCONTINUED | OUTPATIENT
Start: 2019-08-29 | End: 2019-09-09

## 2019-08-29 RX ORDER — FUROSEMIDE 40 MG
40 TABLET ORAL DAILY
Refills: 0 | Status: DISCONTINUED | OUTPATIENT
Start: 2019-08-29 | End: 2019-08-30

## 2019-08-29 RX ORDER — VANCOMYCIN HCL 1 G
1000 VIAL (EA) INTRAVENOUS ONCE
Refills: 0 | Status: COMPLETED | OUTPATIENT
Start: 2019-08-29 | End: 2019-08-29

## 2019-08-29 RX ORDER — DEXTROSE 50 % IN WATER 50 %
15 SYRINGE (ML) INTRAVENOUS ONCE
Refills: 0 | Status: DISCONTINUED | OUTPATIENT
Start: 2019-08-29 | End: 2019-09-11

## 2019-08-29 RX ORDER — RAMIPRIL 5 MG
0 CAPSULE ORAL
Qty: 0 | Refills: 0 | DISCHARGE

## 2019-08-29 RX ORDER — GLUCAGON INJECTION, SOLUTION 0.5 MG/.1ML
1 INJECTION, SOLUTION SUBCUTANEOUS ONCE
Refills: 0 | Status: DISCONTINUED | OUTPATIENT
Start: 2019-08-29 | End: 2019-09-11

## 2019-08-29 RX ORDER — ACETAMINOPHEN 500 MG
650 TABLET ORAL EVERY 6 HOURS
Refills: 0 | Status: DISCONTINUED | OUTPATIENT
Start: 2019-08-29 | End: 2019-09-11

## 2019-08-29 RX ORDER — INSULIN LISPRO 100/ML
VIAL (ML) SUBCUTANEOUS
Refills: 0 | Status: DISCONTINUED | OUTPATIENT
Start: 2019-08-29 | End: 2019-09-11

## 2019-08-29 RX ORDER — RIVAROXABAN 15 MG-20MG
15 KIT ORAL
Refills: 0 | Status: DISCONTINUED | OUTPATIENT
Start: 2019-08-29 | End: 2019-09-11

## 2019-08-29 RX ORDER — PIPERACILLIN AND TAZOBACTAM 4; .5 G/20ML; G/20ML
3.38 INJECTION, POWDER, LYOPHILIZED, FOR SOLUTION INTRAVENOUS ONCE
Refills: 0 | Status: COMPLETED | OUTPATIENT
Start: 2019-08-29 | End: 2019-08-29

## 2019-08-29 RX ORDER — IBUPROFEN 200 MG
600 TABLET ORAL ONCE
Refills: 0 | Status: COMPLETED | OUTPATIENT
Start: 2019-08-29 | End: 2019-08-29

## 2019-08-29 RX ORDER — SODIUM CHLORIDE 9 MG/ML
1000 INJECTION INTRAMUSCULAR; INTRAVENOUS; SUBCUTANEOUS
Refills: 0 | Status: DISCONTINUED | OUTPATIENT
Start: 2019-08-29 | End: 2019-08-29

## 2019-08-29 RX ORDER — TICAGRELOR 90 MG/1
60 TABLET ORAL EVERY 12 HOURS
Refills: 0 | Status: DISCONTINUED | OUTPATIENT
Start: 2019-08-29 | End: 2019-09-02

## 2019-08-29 RX ORDER — INSULIN GLARGINE 100 [IU]/ML
30 INJECTION, SOLUTION SUBCUTANEOUS AT BEDTIME
Refills: 0 | Status: DISCONTINUED | OUTPATIENT
Start: 2019-08-29 | End: 2019-08-30

## 2019-08-29 RX ADMIN — SODIUM CHLORIDE 75 MILLILITER(S): 9 INJECTION INTRAMUSCULAR; INTRAVENOUS; SUBCUTANEOUS at 18:59

## 2019-08-29 RX ADMIN — Medication 12: at 18:59

## 2019-08-29 RX ADMIN — ATORVASTATIN CALCIUM 80 MILLIGRAM(S): 80 TABLET, FILM COATED ORAL at 23:22

## 2019-08-29 RX ADMIN — SODIUM CHLORIDE 1000 MILLILITER(S): 9 INJECTION INTRAMUSCULAR; INTRAVENOUS; SUBCUTANEOUS at 21:05

## 2019-08-29 RX ADMIN — Medication 250 MILLIGRAM(S): at 17:11

## 2019-08-29 RX ADMIN — Medication 100 MILLIGRAM(S): at 23:22

## 2019-08-29 RX ADMIN — Medication 600 MILLIGRAM(S): at 17:11

## 2019-08-29 RX ADMIN — PIPERACILLIN AND TAZOBACTAM 200 GRAM(S): 4; .5 INJECTION, POWDER, LYOPHILIZED, FOR SOLUTION INTRAVENOUS at 18:22

## 2019-08-29 NOTE — ED ADULT NURSE REASSESSMENT NOTE - NS ED NURSE REASSESS COMMENT FT1
MD Finnegan aware of repeat fingerstick of over 600, per MD, admitting team needs to be contacted for further management. LEANN Sanderson made aware of repeat blood sugar, ordered sliding scale insulin and NS. Per NP, to recheck blood sugar in 30 mins.

## 2019-08-29 NOTE — ED ADULT NURSE REASSESSMENT NOTE - NS ED NURSE REASSESS COMMENT FT1
Received report from ED RN Reena; patient A&Ox3, afebrile- VSS, 20 g IV in L forearm patent and site WNL- blood sugar 513 but okay to go to floor as per NP Mary Carmen- NP at bedside. As per NP patient cannot get any more insulin at this time. Patient has bed assignment- will call for report.

## 2019-08-29 NOTE — ED PROVIDER NOTE - CLINICAL SUMMARY MEDICAL DECISION MAKING FREE TEXT BOX
64M with PMHx of CAD (post-three stents), HTN, HLD, T2DM (complicated by peripheral neuropathy and newly diagnosed atrial fibrillation presents with fever and RLE swelling with redness. r/o OM with esr, crp, cbc. xray leg to look for gas formation or OM changes. dvt study. consider abx and bcx 64M with PMHx of CAD (post-three stents), HTN, HLD, T2DM (complicated by peripheral neuropathy and newly diagnosed atrial fibrillation presents with fever and RLE swelling with redness. +cough with pulm edema on CXR recently. Swelling and redness much improved when compared to Monday. Rpt CXr and basic labs and give iv abx

## 2019-08-29 NOTE — H&P ADULT - NSICDXPASTMEDICALHX_GEN_ALL_CORE_FT
PAST MEDICAL HISTORY:  Afib     CAD in native artery     Essential hypertension, hypertension with unspecified goal     Former smoker     Hyperlipidemia, unspecified hyperlipidemia type     Type 2 diabetes mellitus

## 2019-08-29 NOTE — ED PROVIDER NOTE - PHYSICAL EXAMINATION
GEN: Well appearing, well nourished, in no apparent distress.  HEAD: NCAT  HEENT: PERRL, Airway patent, EOMI, non-erythematous pharynx, no exudates, uvula midline, MMM, neck supple, no LAD, no JVD  LUNG: CTAB, no adventitious sounds, no retractions, no nasal flaring  CV: RRR, no murmurs,   Abd: soft, NTND, no rebound or guarding, BS+ in all quadrants, no CVAT  MSK: WWP, Pulses 2+ in extremities, RLE edema and erythema   Neuro:  AAOx3, Ambulatory with stable gait.  Skin: Warm and dry, no evidence of rash  Psych: normal mood and affect GEN: Well appearing, well nourished, in no apparent distress.  HEAD: NCAT  HEENT: PERRL, Airway patent, EOMI, non-erythematous pharynx, no exudates, uvula midline, MMM, neck supple, no LAD, no JVD  LUNG: CTAB, no adventitious sounds, no retractions, no nasal flaring  CV: RRR, no murmurs,   Abd: soft, NTND, no rebound or guarding, BS+ in all quadrants, no CVAT  MSK: WWP, Pulses 2+ in extremities, RLE edema and erythema isolated to ankle  Neuro:  AAOx3, Ambulatory with stable gait.  Skin: Warm and dry, no evidence of rash  Psych: normal mood and affect

## 2019-08-29 NOTE — CHART NOTE - NSCHARTNOTEFT_GEN_A_CORE
Patient DM2 on lantus, humalog and januvia at home. Endocrine, Dr. Анна Rossi  Patient admitted with LE cellulitis found to be hyperglycemic glucose >600 without evidence of DKA  Patient alert and oriented x3, NAD, denied complaints at this time. Reported being noncompliant with diabetic diet.   FS in ED>600. Received humalog 12 units sq. 25 min later FS was checked by ED nurse [phone]. Will check FS again at 2100.  Discussed with Dr. Rossi. Will continue with lantus 30 units at bedtime and Humalog moderate correction scale. Hold Januvia. NS 1L bolus.  monitor FS closely  monitor for s/s of hypo/hyperglycemia  check Hgb A1c  Discussed with Dr. Mcdaniel and in agrrement with the plan  Signed out to floor JUDITH Kim, NP  Medicine  [id number]

## 2019-08-29 NOTE — ED PROVIDER NOTE - OBJECTIVE STATEMENT
64M with PMHx of CAD (post-three stents), HTN, HLD, T2DM (complicated by peripheral neuropathy and newly diagnosed atrial fibrillation presents with fever and RLE swelling with redness. Patient denies chest pain, SOB, cough, abd pain, N/V/D/C, weakness, HA, dizziness, urinary symptoms, extremity pain 64M with PMHx of CAD (post-three stents), HTN, HLD, T2DM (complicated by peripheral neuropathy and newly diagnosed atrial fibrillation presents with fever and RLE swelling with redness x 3 days. was seen on monday dc'd with keflex. recently with cough as well and pulm edema on cxr.  Patient denies chest pain, SOB, abd pain, N/V/D/C, weakness, HA, dizziness, urinary symptoms, extremity pain

## 2019-08-29 NOTE — H&P ADULT - ASSESSMENT
64M with PMHx of CAD (post-three stents), HTN, HLD, T2DM (complicated by peripheral neuropathy), afib on AC presenting with Le cellulitis /fever "not responding to abx " as o/p.  Pt was discharged last week  and since then noted some swelling in RLE and occasional pain... three days ago was seen in ED and was found to have cellulitis of RLE and sent on keflex... yesterday pt had a follow up with cardio and was found to have fever and was told to come to ER however he only reported to Ed today..  still c./o pain in foot though seems to be less..  erythema with some improvement   no N/V/d   no truama   and no hx of gout   in ED was found to have FS>600   met sepsis criteria   BARRY  w CR of 1.7   hyponatremia of sodium 130    1- sepsis sec to cellulitis : check blood culture  start ancef   ID consult ( called )  doubt DVT     2- hyponatremia : marjan pseudohyponatermia  monitor    3- BARRY : sec to sepsis   hold ACE for now   monitor     4- Afib : rate control   cont AC     5- HTN cont bp meds and monitor     6- DM: uncontrolled  A1c 8.7  FS >600   now improved with humalog   restart insulin and f/u with endo

## 2019-08-29 NOTE — ED ADULT NURSE NOTE - CHPI ED NUR SYMPTOMS NEG
no bleeding at site/no blood in mucus/no vomiting/no chills/no rectal pain/no purulent drainage/no redness

## 2019-08-29 NOTE — ED ADULT NURSE NOTE - OBJECTIVE STATEMENT
64 year old male patient presents ambulatory to ED c/o fever yesterday while being treated for cellulitis. Patient was seen in this ED tuesday for R ankle redness and swelling, and d/c home on Keflex. Patient state he went to cardiologist yesterday and had a fever of 100.5F. Patient reporting improvement in redness since taking the abx, but reports pain with bearing weight and walking. Patient well appearing with no acute distress noted. Mild redness noted to medial aspect of R ankle with swelling noted. Patient states he has been taking tylenol with little improvement in pain, last dose 9am this morning. Patient denies current CP, palpitations, SOB, abd pain, n/v/d, chills, urinary symptoms. Patient well appearing with no acute distress noted. `VSS. Awaiting MD evaluation.

## 2019-08-29 NOTE — ED PROVIDER NOTE - NS ED ROS FT
Constitutional: +fevers, no chills.  Eyes: no visual changes.  Ears: no ear drainage, no ear pain.  Nose: no nasal congestion.  Mouth/Throat: no sore throat.  Cardiovascular: no chest pain.  Respiratory: no shortness of breath, no wheezing, no cough  Gastrointestinal: no nausea, no vomiting, no diarrhea, no abdominal pain.  MSK: no flank pain, no back pain. +RLE edeme +RLE redness  Genitourinary: no dysuria, no hematuria.  Skin: no rashes.  Neuro: no headache,   Psychiatric: no known mental health issues.

## 2019-08-29 NOTE — ED PROVIDER NOTE - ATTENDING CONTRIBUTION TO CARE
63 yo male p/w R medial mal pain/erythema/swelling, seen previously for same, on PO antibiotics but spiked a temp of 100.5 last PM despite therapy.  took tylenol today.  will check labs, repeat x-ray, IV abx and admit for further management.

## 2019-08-29 NOTE — H&P ADULT - HISTORY OF PRESENT ILLNESS
64M with PMHx of CAD (post-three stents), HTN, HLD, T2DM (complicated by peripheral neuropathy), afib on AC presenting with Le cellulitis /fever "not responding to abx " as o/p.  Pt was discharged last week  and since then noted some swelling in RLE and occasional pain... three days ago was seen in ED and was found to have cellulitis of RLE and sent on keflex... yesterday pt had a follow up with cardio and was found to have fever and was told to come to ER however he only reported to Ed today..  still c./o pain in foot though seems to be less..  erythema with some improvement   no N/V/d   no truama   and no hx of gout   in ED was found to have FS>600   met sepsis criteria   BARRY  w CR of 1.7   hyponatremia of sodium 130

## 2019-08-29 NOTE — H&P ADULT - EXTREMITIES COMMENTS
RLE:  edema of fooft and ankle   erythema aroudn ankle area   full ROM of ankle with no pain   mild tenderness to palpation over skin

## 2019-08-29 NOTE — ED ADULT NURSE NOTE - NSIMPLEMENTINTERV_GEN_ALL_ED
Implemented All Fall Risk Interventions:  [place] to call system. Call bell, personal items and telephone within reach. Instruct patient to call for assistance. Room bathroom lighting operational. Non-slip footwear when patient is off stretcher. Physically safe environment: no spills, clutter or unnecessary equipment. Stretcher in lowest position, wheels locked, appropriate side rails in place. Provide visual cue, wrist band, yellow gown, etc. Monitor gait and stability. Monitor for mental status changes and reorient to person, place, and time. Review medications for side effects contributing to fall risk. Reinforce activity limits and safety measures with patient and family.

## 2019-08-30 DIAGNOSIS — L03.115 CELLULITIS OF RIGHT LOWER LIMB: ICD-10-CM

## 2019-08-30 DIAGNOSIS — I25.10 ATHEROSCLEROTIC HEART DISEASE OF NATIVE CORONARY ARTERY WITHOUT ANGINA PECTORIS: ICD-10-CM

## 2019-08-30 DIAGNOSIS — E11.9 TYPE 2 DIABETES MELLITUS WITHOUT COMPLICATIONS: ICD-10-CM

## 2019-08-30 LAB
ALBUMIN SERPL ELPH-MCNC: 3.3 G/DL — SIGNIFICANT CHANGE UP (ref 3.3–5)
ALP SERPL-CCNC: 117 U/L — SIGNIFICANT CHANGE UP (ref 40–120)
ALT FLD-CCNC: 17 U/L — SIGNIFICANT CHANGE UP (ref 10–45)
ANION GAP SERPL CALC-SCNC: 10 MMOL/L — SIGNIFICANT CHANGE UP (ref 5–17)
AST SERPL-CCNC: 22 U/L — SIGNIFICANT CHANGE UP (ref 10–40)
BASOPHILS # BLD AUTO: 0.08 K/UL — SIGNIFICANT CHANGE UP (ref 0–0.2)
BASOPHILS NFR BLD AUTO: 0.7 % — SIGNIFICANT CHANGE UP (ref 0–2)
BILIRUB SERPL-MCNC: 1.2 MG/DL — SIGNIFICANT CHANGE UP (ref 0.2–1.2)
BUN SERPL-MCNC: 22 MG/DL — SIGNIFICANT CHANGE UP (ref 7–23)
CALCIUM SERPL-MCNC: 9.1 MG/DL — SIGNIFICANT CHANGE UP (ref 8.4–10.5)
CHLORIDE SERPL-SCNC: 99 MMOL/L — SIGNIFICANT CHANGE UP (ref 96–108)
CO2 SERPL-SCNC: 25 MMOL/L — SIGNIFICANT CHANGE UP (ref 22–31)
CREAT SERPL-MCNC: 1.54 MG/DL — HIGH (ref 0.5–1.3)
CRP SERPL-MCNC: 2.12 MG/DL — HIGH (ref 0–0.4)
EOSINOPHIL # BLD AUTO: 0.57 K/UL — HIGH (ref 0–0.5)
EOSINOPHIL NFR BLD AUTO: 5.2 % — SIGNIFICANT CHANGE UP (ref 0–6)
GLUCOSE BLDC GLUCOMTR-MCNC: 235 MG/DL — HIGH (ref 70–99)
GLUCOSE BLDC GLUCOMTR-MCNC: 252 MG/DL — HIGH (ref 70–99)
GLUCOSE BLDC GLUCOMTR-MCNC: 257 MG/DL — HIGH (ref 70–99)
GLUCOSE BLDC GLUCOMTR-MCNC: 295 MG/DL — HIGH (ref 70–99)
GLUCOSE BLDC GLUCOMTR-MCNC: 374 MG/DL — HIGH (ref 70–99)
GLUCOSE SERPL-MCNC: 284 MG/DL — HIGH (ref 70–99)
HBA1C BLD-MCNC: 9 % — HIGH (ref 4–5.6)
HCT VFR BLD CALC: 33.9 % — LOW (ref 39–50)
HGB BLD-MCNC: 11 G/DL — LOW (ref 13–17)
IMM GRANULOCYTES NFR BLD AUTO: 0.5 % — SIGNIFICANT CHANGE UP (ref 0–1.5)
LYMPHOCYTES # BLD AUTO: 1.59 K/UL — SIGNIFICANT CHANGE UP (ref 1–3.3)
LYMPHOCYTES # BLD AUTO: 14.5 % — SIGNIFICANT CHANGE UP (ref 13–44)
MCHC RBC-ENTMCNC: 29.2 PG — SIGNIFICANT CHANGE UP (ref 27–34)
MCHC RBC-ENTMCNC: 32.4 GM/DL — SIGNIFICANT CHANGE UP (ref 32–36)
MCV RBC AUTO: 89.9 FL — SIGNIFICANT CHANGE UP (ref 80–100)
MONOCYTES # BLD AUTO: 0.92 K/UL — HIGH (ref 0–0.9)
MONOCYTES NFR BLD AUTO: 8.4 % — SIGNIFICANT CHANGE UP (ref 2–14)
NEUTROPHILS # BLD AUTO: 7.76 K/UL — HIGH (ref 1.8–7.4)
NEUTROPHILS NFR BLD AUTO: 70.7 % — SIGNIFICANT CHANGE UP (ref 43–77)
PLATELET # BLD AUTO: 322 K/UL — SIGNIFICANT CHANGE UP (ref 150–400)
POTASSIUM SERPL-MCNC: 4.8 MMOL/L — SIGNIFICANT CHANGE UP (ref 3.5–5.3)
POTASSIUM SERPL-SCNC: 4.8 MMOL/L — SIGNIFICANT CHANGE UP (ref 3.5–5.3)
PROT SERPL-MCNC: 6 G/DL — SIGNIFICANT CHANGE UP (ref 6–8.3)
RAPID RVP RESULT: SIGNIFICANT CHANGE UP
RBC # BLD: 3.77 M/UL — LOW (ref 4.2–5.8)
RBC # FLD: 12.8 % — SIGNIFICANT CHANGE UP (ref 10.3–14.5)
SODIUM SERPL-SCNC: 134 MMOL/L — LOW (ref 135–145)
WBC # BLD: 10.97 K/UL — HIGH (ref 3.8–10.5)
WBC # FLD AUTO: 10.97 K/UL — HIGH (ref 3.8–10.5)

## 2019-08-30 RX ORDER — INSULIN GLARGINE 100 [IU]/ML
35 INJECTION, SOLUTION SUBCUTANEOUS AT BEDTIME
Refills: 0 | Status: DISCONTINUED | OUTPATIENT
Start: 2019-08-30 | End: 2019-08-31

## 2019-08-30 RX ORDER — BENZOCAINE AND MENTHOL 5; 1 G/100ML; G/100ML
1 LIQUID ORAL ONCE
Refills: 0 | Status: COMPLETED | OUTPATIENT
Start: 2019-08-30 | End: 2019-08-30

## 2019-08-30 RX ORDER — INSULIN LISPRO 100/ML
8 VIAL (ML) SUBCUTANEOUS
Refills: 0 | Status: DISCONTINUED | OUTPATIENT
Start: 2019-08-30 | End: 2019-08-31

## 2019-08-30 RX ADMIN — Medication 6: at 12:33

## 2019-08-30 RX ADMIN — Medication 100 MILLIGRAM(S): at 05:21

## 2019-08-30 RX ADMIN — Medication 8 UNIT(S): at 17:44

## 2019-08-30 RX ADMIN — BENZOCAINE AND MENTHOL 1 LOZENGE: 5; 1 LIQUID ORAL at 21:44

## 2019-08-30 RX ADMIN — Medication 100 MILLIGRAM(S): at 17:45

## 2019-08-30 RX ADMIN — INSULIN GLARGINE 35 UNIT(S): 100 INJECTION, SOLUTION SUBCUTANEOUS at 21:44

## 2019-08-30 RX ADMIN — Medication 100 MILLIGRAM(S): at 21:44

## 2019-08-30 RX ADMIN — ATORVASTATIN CALCIUM 80 MILLIGRAM(S): 80 TABLET, FILM COATED ORAL at 21:44

## 2019-08-30 RX ADMIN — TICAGRELOR 60 MILLIGRAM(S): 90 TABLET ORAL at 05:21

## 2019-08-30 RX ADMIN — AMLODIPINE BESYLATE 5 MILLIGRAM(S): 2.5 TABLET ORAL at 10:03

## 2019-08-30 RX ADMIN — Medication 4: at 17:44

## 2019-08-30 RX ADMIN — Medication 100 MILLIGRAM(S): at 14:27

## 2019-08-30 RX ADMIN — Medication 40 MILLIGRAM(S): at 05:21

## 2019-08-30 RX ADMIN — Medication 25 MILLIGRAM(S): at 10:03

## 2019-08-30 RX ADMIN — Medication 2: at 21:44

## 2019-08-30 RX ADMIN — TICAGRELOR 60 MILLIGRAM(S): 90 TABLET ORAL at 17:44

## 2019-08-30 RX ADMIN — Medication 100 MILLIGRAM(S): at 21:45

## 2019-08-30 RX ADMIN — Medication 25 MILLIGRAM(S): at 17:44

## 2019-08-30 RX ADMIN — Medication 10: at 08:41

## 2019-08-30 RX ADMIN — RIVAROXABAN 15 MILLIGRAM(S): KIT at 17:45

## 2019-08-30 NOTE — PROGRESS NOTE ADULT - ASSESSMENT
64M with PMHx of CAD (post-three stents), HTN, HLD, T2DM (complicated by peripheral neuropathy), afib on AC presenting with Le cellulitis /fever "not responding to abx " as o/p.  Pt was discharged last week  and since then noted some swelling in RLE and occasional pain... three days ago was seen in ED and was found to have cellulitis of RLE and sent on keflex... yesterday pt had a follow up with cardio and was found to have fever and was told to come to ER however he only reported to Ed today..  still c./o pain in foot though seems to be less..  erythema with some improvement   no N/V/d   no truama   and no hx of gout   in ED was found to have FS>600   met sepsis criteria   BARRY  w CR of 1.7   hyponatremia of sodium 130    1- sepsis sec to cellulitis : f/u  blood culture  cont  ancef and consider changing to po   f/u with ID   doubt DVT  .. US done two days ago neg ...pt on AC     2- hyponatremia : likley pseudohyponatermia  improved     3- BARRY : sec to sepsis   hold lasix / ACE for now       4- Afib : rate control   cont AC     5- HTN cont bp meds and monitor     6- DM: uncontrolled  A1c 8.7  FS >600   now improved with humalog   restart insulin and f/u with endo     7- cough :   unclear if residual from ifection however seems to coincide with starting aCE   will hold and re-evaluate as o/p.

## 2019-08-30 NOTE — CONSULT NOTE ADULT - SUBJECTIVE AND OBJECTIVE BOX
HPI:   Patient is a 64y male with recent onset Afib, HTN, DM, CAD here - for cough- no pneumonia. PCT normal, sent home with cough meds.  He later noted R ankle swelling- seen by Dr Kumar- given Lasix.  R ankle pain worsened, prompting ED visit - told of cellulitis, sent home with Keflex.  As of  pain slightly less, but noted T100.5 at night when seen by Dr Moon- hosp rec, but pt presented last evening.  Afebrile since arrival, given Vanco and Zosyn, now Cefazolin.  R ankle pain improved.    REVIEW OF SYSTEMS:  All other review of systems negative (Comprehensive ROS)    PAST MEDICAL & SURGICAL HISTORY:  Afib  Essential hypertension, hypertension with unspecified goal  Hyperlipidemia, unspecified hyperlipidemia type  Type 2 diabetes mellitus  CAD in native artery  Former smoker  s/p Carotid Endarterectomy: left      Allergies  No Known Allergies    Antimicrobials Day # 2   ceFAZolin   IVPB 1000 milliGRAM(s) IV Intermittent every 8 hours    Other Medications:  acetaminophen   Tablet .. 650 milliGRAM(s) Oral every 6 hours PRN  amLODIPine   Tablet 5 milliGRAM(s) Oral daily  atorvastatin 80 milliGRAM(s) Oral at bedtime  benzonatate 100 milliGRAM(s) Oral three times a day  dextrose 40% Gel 15 Gram(s) Oral once PRN  dextrose 5%. 1000 milliLiter(s) IV Continuous <Continuous>  dextrose 50% Injectable 12.5 Gram(s) IV Push once  dextrose 50% Injectable 25 Gram(s) IV Push once  dextrose 50% Injectable 25 Gram(s) IV Push once  glucagon  Injectable 1 milliGRAM(s) IntraMuscular once PRN  guaiFENesin   Syrup  (Sugar-Free) 100 milliGRAM(s) Oral every 6 hours PRN  insulin glargine Injectable (LANTUS) 35 Unit(s) SubCutaneous at bedtime  insulin lispro (HumaLOG) corrective regimen sliding scale   SubCutaneous three times a day before meals  insulin lispro (HumaLOG) corrective regimen sliding scale   SubCutaneous at bedtime  insulin lispro Injectable (HumaLOG) 8 Unit(s) SubCutaneous three times a day before meals  metoprolol succinate ER 25 milliGRAM(s) Oral two times a day  rivaroxaban 15 milliGRAM(s) Oral with dinner  ticagrelor 60 milliGRAM(s) Oral every 12 hours      FAMILY HISTORY:  Family history of heart disease: father  age 71      SOCIAL HISTORY:  Smoking: no    ETOH: no      T(F): 98.9 (19 @ 04:58), Max: 98.9 (19 @ 04:58)  HR: 86 (19 @ 04:58)  BP: 124/75 (19 @ 04:58)  RR: 18 (19 @ 04:58)  SpO2: 95% (19 @ 04:58)  Wt(kg): --    PHYSICAL EXAM:  General: alert, no acute distress  Eyes:  anicteric, no conjunctival injection, no discharge  Oropharynx: no lesions or injection 	  Neck: supple, without adenopathy  Lungs: clear to auscultation  Heart: irregular rate and rhythm; no murmur, rubs or gallops  Abdomen: soft, nondistended, nontender, without mass or organomegaly  Skin: no lesions  Extremities: R ankle edema without erythema; minimally tender along ankle joint; ROM intact  Neurologic: alert, oriented, moves all extremities    LAB RESULTS:                        11.0   10.97 )-----------( 322      ( 30 Aug 2019 09:06 )             33.9         134<L>  |  99  |  22  ----------------------------<  284<H>  4.8   |  25  |  1.54<H>    Ca    9.1      30 Aug 2019 06:52    TPro  6.0  /  Alb  3.3  /  TBili  1.2  /  DBili  x   /  AST  22  /  ALT  17  /  AlkPhos  117        MICROBIOLOGY:  RECENT CULTURES:  Pending    Rapid Respiratory Viral Panel (19 @ 12:22)    Rapid RVP Result: [facility]    RADIOLOGY REVIEWED:  Xray Ankle Complete 3 Views, Right (19 @ 09:04) >  No acute fracture or dislocation. Joint spaces are maintained. Chronic   appearing productive change along the distal fibula. Soft tissue swelling   about the ankle.    Xray Chest 2 Views PA/Lat (19 @ 18:10) >  Clear lungs.

## 2019-08-30 NOTE — CONSULT NOTE ADULT - SUBJECTIVE AND OBJECTIVE BOX
HPI:  64M with PMHx of CAD (post-three stents), HTN, HLD, T2DM (complicated by peripheral neuropathy), afib on AC presenting with Le cellulitis /fever "not responding to abx " as o/p.  Pt was discharged last week  and since then noted some swelling in RLE and occasional pain... three days ago was seen in ED and was found to have cellulitis of RLE and sent on keflex... yesterday pt had a follow up with cardio and was found to have fever and was told to come to ER however he only reported to Ed today..  still c./o pain in foot though seems to be less..  erythema with some improvement   no N/V/d   no truama   and no hx of gout   in ED was found to have FS>600   met sepsis criteria   BARRY  w CR of 1.7   hyponatremia of sodium 130 (29 Aug 2019 21:10)  Patient has history of diabetes, on insulin at home, no recent hypoglycemic episodes, no polyuria polydipsia. Patient follows up with PCP for diabetes management.    PAST MEDICAL & SURGICAL HISTORY:  Afib  Essential hypertension, hypertension with unspecified goal  Hyperlipidemia, unspecified hyperlipidemia type  Type 2 diabetes mellitus  CAD in native artery  Former smoker  s/p Carotid Endarterectomy: left      FAMILY HISTORY:  Family history of heart disease: father  age 71      Social History:    Outpatient Medications:    MEDICATIONS  (STANDING):  amLODIPine   Tablet 5 milliGRAM(s) Oral daily  atorvastatin 80 milliGRAM(s) Oral at bedtime  benzonatate 100 milliGRAM(s) Oral three times a day  ceFAZolin   IVPB 1000 milliGRAM(s) IV Intermittent every 8 hours  dextrose 5%. 1000 milliLiter(s) (50 mL/Hr) IV Continuous <Continuous>  dextrose 50% Injectable 12.5 Gram(s) IV Push once  dextrose 50% Injectable 25 Gram(s) IV Push once  dextrose 50% Injectable 25 Gram(s) IV Push once  insulin glargine Injectable (LANTUS) 30 Unit(s) SubCutaneous at bedtime  insulin lispro (HumaLOG) corrective regimen sliding scale   SubCutaneous three times a day before meals  insulin lispro (HumaLOG) corrective regimen sliding scale   SubCutaneous at bedtime  metoprolol succinate ER 25 milliGRAM(s) Oral two times a day  rivaroxaban 15 milliGRAM(s) Oral with dinner  ticagrelor 60 milliGRAM(s) Oral every 12 hours    MEDICATIONS  (PRN):  acetaminophen   Tablet .. 650 milliGRAM(s) Oral every 6 hours PRN Temp greater or equal to 38C (100.4F)  dextrose 40% Gel 15 Gram(s) Oral once PRN Blood Glucose LESS THAN 70 milliGRAM(s)/deciliter  glucagon  Injectable 1 milliGRAM(s) IntraMuscular once PRN Glucose LESS THAN 70 milligrams/deciliter  guaiFENesin   Syrup  (Sugar-Free) 100 milliGRAM(s) Oral every 6 hours PRN Cough      Allergies    No Known Allergies    Intolerances      Review of Systems:  Constitutional: No fever, no chills  Eyes: No blurry vision  Neuro: No tremors  HEENT: No pain, no neck swelling  Cardiovascular: No chest pain, no palpitations  Respiratory: Has SOB, no cough  GI: No nausea, vomiting, abdominal pain  : No dysuria  Skin: no rash  MSK: Has leg swelling.  Psych: no depression  Endocrine: no polyuria, polydipsia    ALL OTHER SYSTEMS REVIEWED AND NEGATIVE    UNABLE TO OBTAIN    PHYSICAL EXAM:  VITALS: T(C): 37.2 (19 @ 04:58)  T(F): 98.9 (19 @ 04:58), Max: 98.9 (19 @ 04:58)  HR: 86 (19 @ 04:58) (72 - 101)  BP: 124/75 (19 @ 04:58) (98/61 - 158/60)  RR:  (16 - 18)  SpO2:  (95% - 100%)  Wt(kg): --  GENERAL: NAD, well-groomed, well-developed  EYES: No proptosis, no lid lag  HEENT:  Atraumatic, Normocephalic  THYROID: Normal size, no palpable nodules  RESPIRATORY: Clear to auscultation bilaterally; No rales, rhonchi, wheezing  CARDIOVASCULAR: Si S2, No murmurs;  GI: Soft, non distended, normal bowel sounds  SKIN: Dry, intact, No rashes or lesions  MUSCULOSKELETAL: Has BL lower extremity edema.  NEURO:  no tremor, sensation decreased in feet BL,    POCT Blood Glucose.: 295 mg/dL (19 @ 12:32)  POCT Blood Glucose.: 374 mg/dL (19 @ 08:39)  POCT Blood Glucose.: 257 mg/dL (19 @ 01:44)  POCT Blood Glucose.: 102 mg/dL (19 @ 22:03)  POCT Blood Glucose.: 163 mg/dL (19 @ 21:00)  POCT Blood Glucose.: 513 mg/dL (19 @ 19:25)  POCT Blood Glucose.: >600 mg/dL (19 @ 18:34)  POCT Blood Glucose.: >600 mg/dL (19 @ 18:32)                            11.0   10.97 )-----------( 322      ( 30 Aug 2019 09:06 )             33.9           134<L>  |  99  |  22  ----------------------------<  284<H>  4.8   |  25  |  1.54<H>    EGFR if : 55<L>  EGFR if non : 48<L>    Ca    9.1          TPro  6.0  /  Alb  3.3  /  TBili  1.2  /  DBili  x   /  AST  22  /  ALT  17  /  AlkPhos  117        Thyroid Function Tests:      Hemoglobin A1C, Whole Blood: 9.0 % <H> [4.0 - 5.6] (19 @ 09:06)  Hemoglobin A1C, Whole Blood: 8.7 % <H> [4.0 - 5.6] (19 @ 19:35)          Radiology:

## 2019-08-30 NOTE — CONSULT NOTE ADULT - PROBLEM SELECTOR RECOMMENDATION 9
Will increase Lantus to 35 units at bed time.  Will increase Humalog to 8 units before each meal in addition to Humalog correction scale coverage.  Patient counseled for compliance with consistent low carb diet.

## 2019-08-30 NOTE — PROGRESS NOTE ADULT - SUBJECTIVE AND OBJECTIVE BOX
Patient is a 64y old  Male who presents with a chief complaint of cellulitis (30 Aug 2019 08:16)                                                               INTERVAL HPI/OVERNIGHT EVENTS:    REVIEW OF SYSTEMS:     CONSTITUTIONAL: No weakness, fevers or chills  EYES/ENT: No visual changes , no ear ache   NECK: No pain or stiffness  RESPIRATORY: No cough, wheezing,  No shortness of breath  CARDIOVASCULAR: No chest pain or palpitations  GASTROINTESTINAL: No abdominal pain  . No nausea, vomiting, or hematemesis; No diarrhea or constipation. No melena or hematochezia.  GENITOURINARY: No dysuria, frequency or hematuria  NEUROLOGICAL: No numbness or weakness                                                                                                                                                                                                                                                                                  Medications:  MEDICATIONS  (STANDING):  amLODIPine   Tablet 5 milliGRAM(s) Oral daily  atorvastatin 80 milliGRAM(s) Oral at bedtime  benzonatate 100 milliGRAM(s) Oral three times a day  ceFAZolin   IVPB 1000 milliGRAM(s) IV Intermittent every 8 hours  dextrose 5%. 1000 milliLiter(s) (50 mL/Hr) IV Continuous <Continuous>  dextrose 50% Injectable 12.5 Gram(s) IV Push once  dextrose 50% Injectable 25 Gram(s) IV Push once  dextrose 50% Injectable 25 Gram(s) IV Push once  insulin glargine Injectable (LANTUS) 35 Unit(s) SubCutaneous at bedtime  insulin lispro (HumaLOG) corrective regimen sliding scale   SubCutaneous three times a day before meals  insulin lispro (HumaLOG) corrective regimen sliding scale   SubCutaneous at bedtime  insulin lispro Injectable (HumaLOG) 8 Unit(s) SubCutaneous three times a day before meals  metoprolol succinate ER 25 milliGRAM(s) Oral two times a day  rivaroxaban 15 milliGRAM(s) Oral with dinner  ticagrelor 60 milliGRAM(s) Oral every 12 hours    MEDICATIONS  (PRN):  acetaminophen   Tablet .. 650 milliGRAM(s) Oral every 6 hours PRN Temp greater or equal to 38C (100.4F)  dextrose 40% Gel 15 Gram(s) Oral once PRN Blood Glucose LESS THAN 70 milliGRAM(s)/deciliter  glucagon  Injectable 1 milliGRAM(s) IntraMuscular once PRN Glucose LESS THAN 70 milligrams/deciliter  guaiFENesin   Syrup  (Sugar-Free) 100 milliGRAM(s) Oral every 6 hours PRN Cough       Allergies    No Known Allergies    Intolerances      Vital Signs Last 24 Hrs  T(C): 37.2 (30 Aug 2019 04:58), Max: 37.2 (30 Aug 2019 04:58)  T(F): 98.9 (30 Aug 2019 04:58), Max: 98.9 (30 Aug 2019 04:58)  HR: 86 (30 Aug 2019 04:58) (72 - 99)  BP: 124/75 (30 Aug 2019 04:58) (114/50 - 158/60)  BP(mean): --  RR: 18 (30 Aug 2019 04:58) (16 - 18)  SpO2: 95% (30 Aug 2019 04:58) (95% - 99%)  CAPILLARY BLOOD GLUCOSE      POCT Blood Glucose.: 295 mg/dL (30 Aug 2019 12:32)  POCT Blood Glucose.: 374 mg/dL (30 Aug 2019 08:39)  POCT Blood Glucose.: 257 mg/dL (30 Aug 2019 01:44)  POCT Blood Glucose.: 102 mg/dL (29 Aug 2019 22:03)  POCT Blood Glucose.: 163 mg/dL (29 Aug 2019 21:00)  POCT Blood Glucose.: 513 mg/dL (29 Aug 2019 19:25)  POCT Blood Glucose.: >600 mg/dL (29 Aug 2019 18:34)  POCT Blood Glucose.: >600 mg/dL (29 Aug 2019 18:32)      08-29 @ 07:01  -  08-30 @ 07:00  --------------------------------------------------------  IN: 1240 mL / OUT: 0 mL / NET: 1240 mL      Physical Exam:    General: NAD  HEENT:  Nonicteric, PERRLA  CV:  RRR, S1S2   Lungs:  CTA B  Abdomen:  Soft, non-tender, no distended, positive BS  Extremities:  ankle  edema  Skin:  improved erythema / warmth   :  No moreno  Neuro:  AAOx3, non-focal, grossly intact                                                                                                                                                                                                                                                                                                LABS:                               11.0   10.97 )-----------( 322      ( 30 Aug 2019 09:06 )             33.9                      08-30    134<L>  |  99  |  22  ----------------------------<  284<H>  4.8   |  25  |  1.54<H>    Ca    9.1      30 Aug 2019 06:52    TPro  6.0  /  Alb  3.3  /  TBili  1.2  /  DBili  x   /  AST  22  /  ALT  17  /  AlkPhos  117  08-30

## 2019-08-30 NOTE — CONSULT NOTE ADULT - SUBJECTIVE AND OBJECTIVE BOX
64 year old male with a history of CAD S/P multiple stents, chronic AF  presents with fever, erythema swelling and pain of the right foot.  He also has HTN, DM, peripheral neuropathy.  The degree of erythema and swelling is much improved.  He has no CV complaints.   He denies chest pain or dyspnea or palpitations    Meds: Xarelto 15 mg qd            Brilinta 60 mg bid            Amlodipine 5 mg qd            Atorvastatin 80 mg qd            Metoprolol 25 mg bid            Furosemide 40 mg qd    /75  HR 86  Lungs clear  Irregular rhythm 1/6 systolic murmur  No edema  Area of erythema much improved    WBC 12.8  Hgb 12.9  Hct  Plt  BUN 22  Crt 1.54  Glucose 607 down to 284    Imp:  CAD  chronic AF  HTN now admitted for cellulitis  Stable from a CV standpoint  Continue present cardiac meds.   Hold Lasix given markedly elevated Glucose and elevated Crt

## 2019-08-31 LAB
ANION GAP SERPL CALC-SCNC: 12 MMOL/L — SIGNIFICANT CHANGE UP (ref 5–17)
BUN SERPL-MCNC: 22 MG/DL — SIGNIFICANT CHANGE UP (ref 7–23)
CALCIUM SERPL-MCNC: 9.5 MG/DL — SIGNIFICANT CHANGE UP (ref 8.4–10.5)
CHLORIDE SERPL-SCNC: 96 MMOL/L — SIGNIFICANT CHANGE UP (ref 96–108)
CO2 SERPL-SCNC: 28 MMOL/L — SIGNIFICANT CHANGE UP (ref 22–31)
CREAT SERPL-MCNC: 1.6 MG/DL — HIGH (ref 0.5–1.3)
GLUCOSE BLDC GLUCOMTR-MCNC: 149 MG/DL — HIGH (ref 70–99)
GLUCOSE BLDC GLUCOMTR-MCNC: 165 MG/DL — HIGH (ref 70–99)
GLUCOSE BLDC GLUCOMTR-MCNC: 168 MG/DL — HIGH (ref 70–99)
GLUCOSE BLDC GLUCOMTR-MCNC: 258 MG/DL — HIGH (ref 70–99)
GLUCOSE SERPL-MCNC: 178 MG/DL — HIGH (ref 70–99)
POTASSIUM SERPL-MCNC: 4.3 MMOL/L — SIGNIFICANT CHANGE UP (ref 3.5–5.3)
POTASSIUM SERPL-SCNC: 4.3 MMOL/L — SIGNIFICANT CHANGE UP (ref 3.5–5.3)
SODIUM SERPL-SCNC: 136 MMOL/L — SIGNIFICANT CHANGE UP (ref 135–145)

## 2019-08-31 RX ORDER — INSULIN GLARGINE 100 [IU]/ML
36 INJECTION, SOLUTION SUBCUTANEOUS AT BEDTIME
Refills: 0 | Status: DISCONTINUED | OUTPATIENT
Start: 2019-08-31 | End: 2019-09-02

## 2019-08-31 RX ORDER — BENZOCAINE AND MENTHOL 5; 1 G/100ML; G/100ML
1 LIQUID ORAL
Refills: 0 | Status: DISCONTINUED | OUTPATIENT
Start: 2019-08-31 | End: 2019-09-11

## 2019-08-31 RX ORDER — ACETAMINOPHEN 500 MG
650 TABLET ORAL ONCE
Refills: 0 | Status: COMPLETED | OUTPATIENT
Start: 2019-08-31 | End: 2019-08-31

## 2019-08-31 RX ORDER — INSULIN LISPRO 100/ML
9 VIAL (ML) SUBCUTANEOUS
Refills: 0 | Status: DISCONTINUED | OUTPATIENT
Start: 2019-08-31 | End: 2019-09-02

## 2019-08-31 RX ADMIN — Medication 100 MILLIGRAM(S): at 22:09

## 2019-08-31 RX ADMIN — Medication 2: at 12:53

## 2019-08-31 RX ADMIN — INSULIN GLARGINE 36 UNIT(S): 100 INJECTION, SOLUTION SUBCUTANEOUS at 22:10

## 2019-08-31 RX ADMIN — ATORVASTATIN CALCIUM 80 MILLIGRAM(S): 80 TABLET, FILM COATED ORAL at 22:11

## 2019-08-31 RX ADMIN — TICAGRELOR 60 MILLIGRAM(S): 90 TABLET ORAL at 22:10

## 2019-08-31 RX ADMIN — Medication 25 MILLIGRAM(S): at 06:47

## 2019-08-31 RX ADMIN — Medication 100 MILLIGRAM(S): at 06:46

## 2019-08-31 RX ADMIN — BENZOCAINE AND MENTHOL 1 LOZENGE: 5; 1 LIQUID ORAL at 22:10

## 2019-08-31 RX ADMIN — Medication 650 MILLIGRAM(S): at 20:43

## 2019-08-31 RX ADMIN — Medication 100 MILLIGRAM(S): at 22:10

## 2019-08-31 RX ADMIN — Medication 2: at 22:11

## 2019-08-31 RX ADMIN — Medication 100 MILLIGRAM(S): at 17:10

## 2019-08-31 RX ADMIN — Medication 25 MILLIGRAM(S): at 17:13

## 2019-08-31 RX ADMIN — Medication 8 UNIT(S): at 09:03

## 2019-08-31 RX ADMIN — Medication 100 MILLIGRAM(S): at 06:47

## 2019-08-31 RX ADMIN — Medication 8 UNIT(S): at 12:53

## 2019-08-31 RX ADMIN — AMLODIPINE BESYLATE 5 MILLIGRAM(S): 2.5 TABLET ORAL at 06:47

## 2019-08-31 RX ADMIN — RIVAROXABAN 15 MILLIGRAM(S): KIT at 17:10

## 2019-08-31 RX ADMIN — TICAGRELOR 60 MILLIGRAM(S): 90 TABLET ORAL at 06:47

## 2019-08-31 RX ADMIN — Medication 100 MILLIGRAM(S): at 17:09

## 2019-08-31 RX ADMIN — Medication 2: at 18:31

## 2019-08-31 NOTE — DIETITIAN INITIAL EVALUATION ADULT. - ENERGY NEEDS
Ht 66 inches Wt 147.6 pounds BMI 23.8 Kg/m^2   pounds +/- 10%; 104% IBW  Edema: none noted Skin: no pressure injuries per nursing flow sheet    Other pertinent information: 63 yo male with PMH of CAD s/p 3 stents, HTN, HLD, T2DM, A fib presenting with LE cellulitis, fever, sepsis 2/2 cellulitis, and BARRY

## 2019-08-31 NOTE — DIETITIAN INITIAL EVALUATION ADULT. - ADD RECOMMEND
1) Continue to monitor intake, weight, labs, GI tolerance, skin integrity  2) Provide food preferences within dietary restrictions when feasible   3) Encourage good PO intake 4) Reinforce diet education (provided above) as appropriate

## 2019-08-31 NOTE — PROGRESS NOTE ADULT - ASSESSMENT
64M with PMHx of CAD (post-three stents), HTN, HLD, T2DM (complicated by peripheral neuropathy), afib on AC presenting with Le cellulitis /fever "not responding to abx " as o/p.  Pt was discharged last week  and since then noted some swelling in RLE and occasional pain... three days ago was seen in ED and was found to have cellulitis of RLE and sent on keflex... yesterday pt had a follow up with cardio and was found to have fever and was told to come to ER however he only reported to Ed today..  still c./o pain in foot though seems to be less..  erythema with some improvement   no N/V/d   no truama   and no hx of gout   in ED was found to have FS>600   met sepsis criteria   BARRY  w CR of 1.7   hyponatremia of sodium 130    1- sepsis sec to cellulitis :  blood culture  neg  cont  ancef and consider changing to po   f/u with ID   doubt DVT  .. US done two days ago neg ...pt on AC     2- hyponatremia : likley pseudohyponatermia  improved     3- BARRY : sec to sepsis   hold lasix / ACE for now   still with Elevated Cr   check US and bladder scan   renal input     4- Afib : rate control   cont AC     5- HTN cont bp meds and monitor     6- DM: uncontrolled  A1c 8.7  FS >600   now improved with humalog   restart insulin and f/u with endo     7- cough :   unclear if residual from ifection however seems to coincide with starting aCE   will hold and re-evaluate as o/p. 64M with PMHx of CAD (post-three stents), HTN, HLD, T2DM (complicated by peripheral neuropathy), afib on AC presenting with Le cellulitis /fever "not responding to abx " as o/p.  Pt was discharged last week  and since then noted some swelling in RLE and occasional pain... three days ago was seen in ED and was found to have cellulitis of RLE and sent on keflex... yesterday pt had a follow up with cardio and was found to have fever and was told to come to ER however he only reported to Ed today..  still c./o pain in foot though seems to be less..  erythema with some improvement   no N/V/d   no truama   and no hx of gout   in ED was found to have FS>600   met sepsis criteria   BARRY  w CR of 1.7   hyponatremia of sodium 130    1- sepsis sec to cellulitis :  blood culture  neg  cont  ancef and consider changing to po   f/u with ID   doubt DVT  .. US done two days ago neg ...pt on AC     2- hyponatremia : likley pseudohyponatermia  improved     3- BARRY : sec to sepsis   hold lasix / ACE for now   still with Elevated Cr   check US and bladder scan   renal input     4- Afib : rate control   cont AC     5- HTN cont bp meds and monitor     6- DM: uncontrolled  A1c 8.7  FS >600   now improved with humalog   f/u with endo     7- cough :   unclear if residual from ifection however seems to coincide with starting aCE   will hold and re-evaluate as o/p.

## 2019-08-31 NOTE — PROGRESS NOTE ADULT - SUBJECTIVE AND OBJECTIVE BOX
SUBJECTIVE:  	  MEDICATIONS:  amLODIPine   Tablet 5 milliGRAM(s) Oral daily  metoprolol succinate ER 25 milliGRAM(s) Oral two times a day    ceFAZolin   IVPB 1000 milliGRAM(s) IV Intermittent every 8 hours    benzonatate 100 milliGRAM(s) Oral three times a day  guaiFENesin   Syrup  (Sugar-Free) 100 milliGRAM(s) Oral every 6 hours PRN    acetaminophen   Tablet .. 650 milliGRAM(s) Oral every 6 hours PRN      atorvastatin 80 milliGRAM(s) Oral at bedtime  dextrose 40% Gel 15 Gram(s) Oral once PRN  dextrose 50% Injectable 12.5 Gram(s) IV Push once  dextrose 50% Injectable 25 Gram(s) IV Push once  dextrose 50% Injectable 25 Gram(s) IV Push once  glucagon  Injectable 1 milliGRAM(s) IntraMuscular once PRN  insulin glargine Injectable (LANTUS) 35 Unit(s) SubCutaneous at bedtime  insulin lispro (HumaLOG) corrective regimen sliding scale   SubCutaneous three times a day before meals  insulin lispro (HumaLOG) corrective regimen sliding scale   SubCutaneous at bedtime  insulin lispro Injectable (HumaLOG) 8 Unit(s) SubCutaneous three times a day before meals    dextrose 5%. 1000 milliLiter(s) IV Continuous <Continuous>  rivaroxaban 15 milliGRAM(s) Oral with dinner  ticagrelor 60 milliGRAM(s) Oral every 12 hours      REVIEW OF SYSTEMS:    CONSTITUTIONAL: No fever, weight loss, or fatigue  EYES: No eye pain, visual disturbances, or discharge  NECK: No pain or stiffness  RESPIRATORY: No cough, wheezing, chills or hemoptysis; No Shortness of Breath  CARDIOVASCULAR: No chest pain, palpitations, dizziness, or leg swelling  GASTROINTESTINAL: No abdominal or epigastric pain. No nausea, vomiting, or hematemesis; No diarrhea or constipation. No melena or hematochezia.  GENITOURINARY: No dysuria, frequency, hematuria, or incontinence  NEUROLOGICAL: No headaches, memory loss, loss of strength, numbness, or tremors  SKIN: No itching, burning, rashes, or lesions   LYMPH Nodes: No enlarged glands  MUSCULOSKELETAL: No joint pain or swelling; No muscle, back, or extremity pain  All other review of systems are negative.  	  [ ] Unable to obtain    PHYSICAL EXAM:  T(C): 37 (08-31-19 @ 05:01), Max: 37.3 (08-30-19 @ 20:08)  HR: 103 (08-31-19 @ 05:01) (88 - 108)  BP: 120/75 (08-31-19 @ 05:01) (120/75 - 135/73)  RR: 18 (08-31-19 @ 05:01) (18 - 18)  SpO2: 100% (08-31-19 @ 05:01) (96% - 100%)  Wt(kg): --  I&O's Summary        PHYSICAL EXAM    Appearance: Normal	  HEENT:   Normal oral mucosa, PERRL, EOMI	  NECK: Soft and supple, No LAD, No JVD  Cardiovascular: Regular Rate and Rhythm, Normal S1 S2, No murmurs, No clicks, gallops or rubs  Respiratory: Lungs clear to auscultation	  Gastrointestinal:  Soft, Non-tender, + BS	  Skin: No rashes, No ecchymoses, No cyanosis  Neurologic: Non-focal  Extremities: No clubbing, cyanosis or edema  Vascular: Peripheral pulses palpable 2+ bilaterally    TELEMETRY: 	    ECG:  	  RADIOLOGY  DIAGNOSTIC TESTING:  [ ] Echocardiogram:  [ ] Catheterization:  [ ] Stress Test:    OTHER: 	    LABS:	 	    CARDIAC MARKERS:                                  11.0   10.97 )-----------( 322      ( 30 Aug 2019 09:06 )             33.9     08-31    136  |  96  |  22  ----------------------------<  178<H>  4.3   |  28  |  1.60<H>    Ca    9.5      31 Aug 2019 06:26    TPro  6.0  /  Alb  3.3  /  TBili  1.2  /  DBili  x   /  AST  22  /  ALT  17  /  AlkPhos  117  08-30

## 2019-08-31 NOTE — PROGRESS NOTE ADULT - ASSESSMENT
64 year old male with a history of CAD S/P multiple stents, chronic AF  presents with fever, erythema swelling and pain of the right foot.  He also has HTN, DM, peripheral neuropathy.  The degree of erythema and swelling is much improved.   In the office it was quite red over the medial malleolus.   - cont ABX  - cont Xarelto for thromboembolism prophylaxis. has persistent af  - Cont Brillinta at lower dose per PEGASUS trial - PCI > 1 year ago  - cont metoprolol  - Stable from CV standpoint - much improved

## 2019-08-31 NOTE — PROGRESS NOTE ADULT - SUBJECTIVE AND OBJECTIVE BOX
Patient is a 64y old  Male who presents with a chief complaint of Celulitis (31 Aug 2019 15:24)                                                               INTERVAL HPI/OVERNIGHT EVENTS:    REVIEW OF SYSTEMS:     CONSTITUTIONAL: No weakness, fevers or chills  EYES/ENT: No visual changes , no ear ache   NECK: No pain or stiffness  RESPIRATORY: No cough, wheezing,  No shortness of breath  CARDIOVASCULAR: No chest pain or palpitations  GASTROINTESTINAL: No abdominal pain  . No nausea, vomiting, or hematemesis; No diarrhea or constipation. No melena or hematochezia.  GENITOURINARY: No dysuria, frequency or hematuria  NEUROLOGICAL: No numbness or weakness  SKIN: No itching, burning, rashes, or lesions                                                                                                                                                                                                                                                                                 Medications:  MEDICATIONS  (STANDING):  amLODIPine   Tablet 5 milliGRAM(s) Oral daily  atorvastatin 80 milliGRAM(s) Oral at bedtime  benzonatate 100 milliGRAM(s) Oral three times a day  ceFAZolin   IVPB 1000 milliGRAM(s) IV Intermittent every 8 hours  dextrose 5%. 1000 milliLiter(s) (50 mL/Hr) IV Continuous <Continuous>  dextrose 50% Injectable 12.5 Gram(s) IV Push once  dextrose 50% Injectable 25 Gram(s) IV Push once  dextrose 50% Injectable 25 Gram(s) IV Push once  insulin glargine Injectable (LANTUS) 36 Unit(s) SubCutaneous at bedtime  insulin lispro (HumaLOG) corrective regimen sliding scale   SubCutaneous three times a day before meals  insulin lispro (HumaLOG) corrective regimen sliding scale   SubCutaneous at bedtime  insulin lispro Injectable (HumaLOG) 9 Unit(s) SubCutaneous three times a day before meals  metoprolol succinate ER 25 milliGRAM(s) Oral two times a day  rivaroxaban 15 milliGRAM(s) Oral with dinner  ticagrelor 60 milliGRAM(s) Oral every 12 hours    MEDICATIONS  (PRN):  acetaminophen   Tablet .. 650 milliGRAM(s) Oral every 6 hours PRN Temp greater or equal to 38C (100.4F)  benzocaine 15 mG/menthol 3.6 mG (Sugar-Free) Lozenge 1 Lozenge Oral four times a day PRN Sore Throat  dextrose 40% Gel 15 Gram(s) Oral once PRN Blood Glucose LESS THAN 70 milliGRAM(s)/deciliter  glucagon  Injectable 1 milliGRAM(s) IntraMuscular once PRN Glucose LESS THAN 70 milligrams/deciliter  guaiFENesin   Syrup  (Sugar-Free) 100 milliGRAM(s) Oral every 6 hours PRN Cough       Allergies    No Known Allergies    Intolerances      Vital Signs Last 24 Hrs  T(C): 36.8 (31 Aug 2019 20:49), Max: 37.3 (31 Aug 2019 13:53)  T(F): 98.2 (31 Aug 2019 20:49), Max: 99.2 (31 Aug 2019 13:53)  HR: 110 (31 Aug 2019 20:49) (87 - 110)  BP: 139/66 (31 Aug 2019 20:49) (120/75 - 165/62)  BP(mean): --  RR: 19 (31 Aug 2019 20:49) (18 - 20)  SpO2: 98% (31 Aug 2019 20:49) (98% - 100%)  CAPILLARY BLOOD GLUCOSE      POCT Blood Glucose.: 258 mg/dL (31 Aug 2019 21:41)  POCT Blood Glucose.: 168 mg/dL (31 Aug 2019 18:27)  POCT Blood Glucose.: 165 mg/dL (31 Aug 2019 12:51)  POCT Blood Glucose.: 149 mg/dL (31 Aug 2019 09:00)      Physical Exam:    Daily     Daily Weight in k.1 (31 Aug 2019 11:47)  General:  Well appearing, NAD, not cachetic  HEENT:  Nonicteric, PERRLA  CV:  RRR, S1S2   Lungs:  CTA B/L, no wheezes, rales, rhonchi  Abdomen:  Soft, non-tender, no distended, positive BS  Extremities:  2+ pulses, no c/c, no edema  Skin:  Warm and dry, no rashes  :  No moreno  Neuro:  AAOx3, non-focal, grossly intact                                                                                                                                                                                                                                                                                                LABS:                               11.0   10.97 )-----------( 322      ( 30 Aug 2019 09:06 )             33.9                      08-    136  |  96  |  22  ----------------------------<  178<H>  4.3   |  28  |  1.60<H>    Ca    9.5      31 Aug 2019 06:26    TPro  6.0  /  Alb  3.3  /  TBili  1.2  /  DBili  x   /  AST  22  /  ALT  17  /  AlkPhos  117  08-30                       RADIOLOGY & ADDITIONAL TESTS         I personally reviewed: [  ]EKG   [  ]CXR    [  ] CT      A/P:         Discussed with :     Derek consultants' Notes   Time spent : Patient is a 64y old  Male who presents with a chief complaint of Celulitis (31 Aug 2019 15:24)                                                               INTERVAL HPI/OVERNIGHT EVENTS:    REVIEW OF SYSTEMS:     CONSTITUTIONAL: No weakness, fevers or chills  RESPIRATORY: No cough, wheezing,  No shortness of breath  CARDIOVASCULAR: No chest pain or palpitations  GASTROINTESTINAL: No abdominal pain  . No nausea, vomiting, or hematemesis; No diarrhea or constipation. No melena or hematochezia.  GENITOURINARY: No dysuria, frequency or hematuria  NEUROLOGICAL: No numbness or weakness  MSK  L thigh pain                                                                                                                                                                                                                                                                            Medications:  MEDICATIONS  (STANDING):  amLODIPine   Tablet 5 milliGRAM(s) Oral daily  atorvastatin 80 milliGRAM(s) Oral at bedtime  benzonatate 100 milliGRAM(s) Oral three times a day  ceFAZolin   IVPB 1000 milliGRAM(s) IV Intermittent every 8 hours  dextrose 5%. 1000 milliLiter(s) (50 mL/Hr) IV Continuous <Continuous>  dextrose 50% Injectable 12.5 Gram(s) IV Push once  dextrose 50% Injectable 25 Gram(s) IV Push once  dextrose 50% Injectable 25 Gram(s) IV Push once  insulin glargine Injectable (LANTUS) 36 Unit(s) SubCutaneous at bedtime  insulin lispro (HumaLOG) corrective regimen sliding scale   SubCutaneous three times a day before meals  insulin lispro (HumaLOG) corrective regimen sliding scale   SubCutaneous at bedtime  insulin lispro Injectable (HumaLOG) 9 Unit(s) SubCutaneous three times a day before meals  metoprolol succinate ER 25 milliGRAM(s) Oral two times a day  rivaroxaban 15 milliGRAM(s) Oral with dinner  ticagrelor 60 milliGRAM(s) Oral every 12 hours    MEDICATIONS  (PRN):  acetaminophen   Tablet .. 650 milliGRAM(s) Oral every 6 hours PRN Temp greater or equal to 38C (100.4F)  benzocaine 15 mG/menthol 3.6 mG (Sugar-Free) Lozenge 1 Lozenge Oral four times a day PRN Sore Throat  dextrose 40% Gel 15 Gram(s) Oral once PRN Blood Glucose LESS THAN 70 milliGRAM(s)/deciliter  glucagon  Injectable 1 milliGRAM(s) IntraMuscular once PRN Glucose LESS THAN 70 milligrams/deciliter  guaiFENesin   Syrup  (Sugar-Free) 100 milliGRAM(s) Oral every 6 hours PRN Cough       Allergies    No Known Allergies    Intolerances      Vital Signs Last 24 Hrs  T(C): 36.8 (31 Aug 2019 20:49), Max: 37.3 (31 Aug 2019 13:53)  T(F): 98.2 (31 Aug 2019 20:49), Max: 99.2 (31 Aug 2019 13:53)  HR: 110 (31 Aug 2019 20:49) (87 - 110)  BP: 139/66 (31 Aug 2019 20:49) (120/75 - 165/62)  BP(mean): --  RR: 19 (31 Aug 2019 20:49) (18 - 20)  SpO2: 98% (31 Aug 2019 20:49) (98% - 100%)  CAPILLARY BLOOD GLUCOSE      POCT Blood Glucose.: 258 mg/dL (31 Aug 2019 21:41)  POCT Blood Glucose.: 168 mg/dL (31 Aug 2019 18:27)  POCT Blood Glucose.: 165 mg/dL (31 Aug 2019 12:51)  POCT Blood Glucose.: 149 mg/dL (31 Aug 2019 09:00)      Physical Exam:    Daily     Daily Weight in k.1 (31 Aug 2019 11:47)  General:  Well appearing, NAD, not cachetic  HEENT:  Nonicteric, PERRLA  CV:  RRR, S1S2   Lungs:  CTA B/L, no wheezes, rales, rhonchi  Abdomen:  Soft, non-tender, no distended, positive BS  Extremities: L thigh hematomoa   Neuro:  AAOx3, non-focal, grossly intact                                                                                                                                                                                                                                                                                                LABS:                               11.0   10.97 )-----------( 322      ( 30 Aug 2019 09:06 )             33.9                      08    136  |  96  |  22  ----------------------------<  178<H>  4.3   |  28  |  1.60<H>    Ca    9.5      31 Aug 2019 06:26    TPro  6.0  /  Alb  3.3  /  TBili  1.2  /  DBili  x   /  AST  22  /  ALT  17  /  AlkPhos  117  08-30

## 2019-08-31 NOTE — PROGRESS NOTE ADULT - SUBJECTIVE AND OBJECTIVE BOX
Endocrinology Attending Covering for Dr. Rosis      Chief complaint  Patient is a 64y old  Male who presents with a chief complaint of Cellulitis (31 Aug 2019 11:31)   Review of systems  Patient in bed, looks comfortable, no fever,  had no hypoglycemia.    Labs and Fingersticks  CAPILLARY BLOOD GLUCOSE      POCT Blood Glucose.: 165 mg/dL (31 Aug 2019 12:51)  POCT Blood Glucose.: 149 mg/dL (31 Aug 2019 09:00)  POCT Blood Glucose.: 252 mg/dL (30 Aug 2019 21:12)  POCT Blood Glucose.: 235 mg/dL (30 Aug 2019 17:32)      Anion Gap, Serum: 12 (08-31 @ 06:26)  Anion Gap, Serum: 10 (08-30 @ 06:52)  Anion Gap, Serum: 17 (08-29 @ 16:39)    Hemoglobin A1C, Whole Blood: 9.0 <H> (08-30 @ 09:06)    Calcium, Total Serum: 9.5 (08-31 @ 06:26)  Calcium, Total Serum: 9.1 (08-30 @ 06:52)  Calcium, Total Serum: 9.5 (08-29 @ 16:39)  Albumin, Serum: 3.3 (08-30 @ 06:52)  Albumin, Serum: 4.1 (08-29 @ 16:39)    Alanine Aminotransferase (ALT/SGPT): 17 (08-30 @ 06:52)  Alanine Aminotransferase (ALT/SGPT): 24 (08-29 @ 16:39)  Alkaline Phosphatase, Serum: 117 (08-30 @ 06:52)  Alkaline Phosphatase, Serum: 154 <H> (08-29 @ 16:39)  Aspartate Aminotransferase (AST/SGOT): 22 (08-30 @ 06:52)  Aspartate Aminotransferase (AST/SGOT): 24 (08-29 @ 16:39)        08-31    136  |  96  |  22  ----------------------------<  178<H>  4.3   |  28  |  1.60<H>    Ca    9.5      31 Aug 2019 06:26    TPro  6.0  /  Alb  3.3  /  TBili  1.2  /  DBili  x   /  AST  22  /  ALT  17  /  AlkPhos  117  08-30                        11.0   10.97 )-----------( 322      ( 30 Aug 2019 09:06 )             33.9     Medications  MEDICATIONS  (STANDING):  amLODIPine   Tablet 5 milliGRAM(s) Oral daily  atorvastatin 80 milliGRAM(s) Oral at bedtime  benzonatate 100 milliGRAM(s) Oral three times a day  ceFAZolin   IVPB 1000 milliGRAM(s) IV Intermittent every 8 hours  dextrose 5%. 1000 milliLiter(s) (50 mL/Hr) IV Continuous <Continuous>  dextrose 50% Injectable 12.5 Gram(s) IV Push once  dextrose 50% Injectable 25 Gram(s) IV Push once  dextrose 50% Injectable 25 Gram(s) IV Push once  insulin glargine Injectable (LANTUS) 35 Unit(s) SubCutaneous at bedtime  insulin lispro (HumaLOG) corrective regimen sliding scale   SubCutaneous three times a day before meals  insulin lispro (HumaLOG) corrective regimen sliding scale   SubCutaneous at bedtime  insulin lispro Injectable (HumaLOG) 8 Unit(s) SubCutaneous three times a day before meals  metoprolol succinate ER 25 milliGRAM(s) Oral two times a day  rivaroxaban 15 milliGRAM(s) Oral with dinner  ticagrelor 60 milliGRAM(s) Oral every 12 hours      Physical Exam  General: Patient comfortable in bed  Vital Signs Last 12 Hrs  T(F): 99.2 (08-31-19 @ 13:53), Max: 99.2 (08-31-19 @ 13:53)  HR: 102 (08-31-19 @ 13:53) (102 - 103)  BP: 148/70 (08-31-19 @ 13:53) (120/75 - 148/70)  BP(mean): --  RR: 20 (08-31-19 @ 13:53) (18 - 20)  SpO2: 99% (08-31-19 @ 13:53) (99% - 100%)  Neck: No palpable thyroid nodules.  CVS: S1S2, No murmurs  Respiratory: No wheezing, no crepitations  GI: Abdomen soft, bowel sounds positive  Musculoskeletal:  edema lower extremities.   Skin: No skin rashes, no ecchymosis    Diagnostics

## 2019-08-31 NOTE — PROGRESS NOTE ADULT - ASSESSMENT
65 yo M, DM, CAD, Afib, here briefly for cough, then ED 8/26 for R ankle pain, swelling, redness, given Keflex.    Some improvement, but then low grade fever and returned 8/29    Afebrile here, WBC minimally elevated.  R ankle with only mild residual edema; no erythema.    Presentation atypical for cellulitis, no portal of entry.  Prompt resolution of erythema.  Prominent pain, would be more concerned abt crystal dz, ?primary arthritis, ?gout vs pseudogout, ?induced by diuretic use  Still no erythema     Plan:  No objection to d/c home with completion of same/prior Keflex prescription  Still would be concerned abt ankle arthritis- received Ibuprofen here

## 2019-08-31 NOTE — PROGRESS NOTE ADULT - ASSESSMENT
Assessment /PLAN  DMT2: 64y Male with DM T2 with hyperglycemia, A1C 9% , was insulin at home, blood sugars running high, no hypoglycemic episode,  eating meals,  non compliant with low carb diet.   Increase Lantus to 36 units, and Humalog 9 units pre meals  Cellulitis Left leg: On antibiotics, stable.  CAD: on medications, no chest pain, stable, monitored.  HTN: Controlled,  on antihypertensive medications.      Geronimo Butts MD  [phone]

## 2019-08-31 NOTE — PROGRESS NOTE ADULT - SUBJECTIVE AND OBJECTIVE BOX
CC: f/u for possible R ankle cellulitis    Patient reports feeling better, ambulating, minimal R ankle discomfort    REVIEW OF SYSTEMS:  All other review of systems negative (Comprehensive ROS)    Antimicrobials Day # 3  ceFAZolin   IVPB 1000 milliGRAM(s) IV Intermittent every 8 hours    Other Medications Reviewed    T(F): 98.6 (08-31-19 @ 05:01), Max: 99.2 (08-30-19 @ 20:08)  HR: 103 (08-31-19 @ 05:01)  BP: 120/75 (08-31-19 @ 05:01)  RR: 18 (08-31-19 @ 05:01)  SpO2: 100% (08-31-19 @ 05:01)  Wt(kg): --    PHYSICAL EXAM:  General: alert, no acute distress  Eyes:  anicteric, no conjunctival injection, no discharge  Oropharynx: no lesions or injection 	  Neck: supple, without adenopathy  Lungs: clear to auscultation  Heart: irregular rhythm; no murmur, rubs or gallops  Abdomen: soft, nondistended, nontender, without mass or organomegaly  Skin: no lesions  Extremities: minimal R ankle edema; no erythema, nontender  Neurologic: alert, oriented, moves all extremities    LAB RESULTS:                        11.0   10.97 )-----------( 322      ( 30 Aug 2019 09:06 )             33.9     08-31    136  |  96  |  22  ----------------------------<  178<H>  4.3   |  28  |  1.60<H>    Ca    9.5      31 Aug 2019 06:26    TPro  6.0  /  Alb  3.3  /  TBili  1.2  /  DBili  x   /  AST  22  /  ALT  17  /  AlkPhos  117  08-30    MICROBIOLOGY:  RECENT CULTURES:  08-30 @ 01:50 .Blood     No growth to date.    RADIOLOGY REVIEWED:  Xray Chest 2 Views PA/Lat (08.29.19 @ 18:10) >  Clear lungs.

## 2019-08-31 NOTE — DIETITIAN INITIAL EVALUATION ADULT. - OTHER INFO
SOURCE OF INFORMATION: medical record; patient    INFORMATION PTA  -Diet PTA: Pt states he was following an unrestricted diet PTA. Does try to avoid sweetened beverages (soda, juice, iced tea). Usual Diet Recall: Breakfast- cereal (2-3 bowls) with milk, banana, coffee with creamer Lunch- soup, salad, or deli meat sandwich on white bread Dinner- raw mixed vegetables or pizza Snacks- cereal bars, chips, cheese doodles, iced cream Beverages- coffee, lemon water. Diet recall reveal high intake of CHOs, unbalanced meal selections, low protein intake. Confirmed history of T2DM, states he was taking Januvia & Lantus @ night for management. States he would SMBG 2x daily with normal BG levels in the morning & elevated levels @ night (200s). Noted HgbA1c 9.0% suggestive of poor management. Amenable to diet education as below  -Nutrition Status PTA: Pt endorsed a good appetite & PO intakes PTA  -Nutrition Supplements PTA: N/A  -Food Allergies: Reports NKFA  -Vitamin/Supplements PTA: Pt states he was taking vitamin A, C, D3, B6, B12, zinc, and magnesium daily PTA  -Weight History PTA: Pt reports UBW of 148 pounds, denied any recent changes in weight. Noted admit weight of 147.6 pounds consistent with weight history    INFORMATION DURING THIS ADMISSION  -Last BM: Reports regular BMs, last 8/30 per chart  -Other Subjective Information: Pt endorsed a good appetite & good PO intakes during this admission, typically consuming ~100% of meals.     -Therapeutic Diet Education Provided: Provided diet education regarding T2DM. Education included dietary sources of carbohydrates (i.e. concentrated sweets, starches, dairy, fruit), monitoring portion sizes of carbohydrates, and including good sources of high biological protein & fiber with carbohydrates during meals/snacks. Educated patient on importance of consistent meal pattern, MyPlate healthy eating model,  for CHO portions, label reading for CHO content, limiting concentrated sweets in diet, and importance of monitoring fingersticks daily. Provided written materials of T2DM nutrition therapy & label reading for CHO content from [employer] as well as [employer] Handout

## 2019-09-01 LAB
APPEARANCE UR: CLEAR — SIGNIFICANT CHANGE UP
BILIRUB UR-MCNC: NEGATIVE — SIGNIFICANT CHANGE UP
CK SERPL-CCNC: 99 U/L — SIGNIFICANT CHANGE UP (ref 30–200)
COLOR SPEC: YELLOW — SIGNIFICANT CHANGE UP
CREAT ?TM UR-MCNC: 70 MG/DL — SIGNIFICANT CHANGE UP
DIFF PNL FLD: NEGATIVE — SIGNIFICANT CHANGE UP
EOSINOPHIL NFR URNS MANUAL: NEGATIVE — SIGNIFICANT CHANGE UP
GLUCOSE BLDC GLUCOMTR-MCNC: 195 MG/DL — HIGH (ref 70–99)
GLUCOSE BLDC GLUCOMTR-MCNC: 223 MG/DL — HIGH (ref 70–99)
GLUCOSE BLDC GLUCOMTR-MCNC: 293 MG/DL — HIGH (ref 70–99)
GLUCOSE BLDC GLUCOMTR-MCNC: 99 MG/DL — SIGNIFICANT CHANGE UP (ref 70–99)
GLUCOSE UR QL: ABNORMAL
KETONES UR-MCNC: SIGNIFICANT CHANGE UP
LEUKOCYTE ESTERASE UR-ACNC: NEGATIVE — SIGNIFICANT CHANGE UP
NITRITE UR-MCNC: NEGATIVE — SIGNIFICANT CHANGE UP
PH UR: 6.5 — SIGNIFICANT CHANGE UP (ref 5–8)
PHOSPHATE SERPL-MCNC: 2.6 MG/DL — SIGNIFICANT CHANGE UP (ref 2.5–4.5)
PROT ?TM UR-MCNC: 24 MG/DL — HIGH (ref 0–12)
PROT UR-MCNC: SIGNIFICANT CHANGE UP
SODIUM UR-SCNC: 61 MMOL/L — SIGNIFICANT CHANGE UP
SP GR SPEC: 1.02 — SIGNIFICANT CHANGE UP (ref 1.01–1.02)
UROBILINOGEN FLD QL: SIGNIFICANT CHANGE UP

## 2019-09-01 PROCEDURE — 99233 SBSQ HOSP IP/OBS HIGH 50: CPT

## 2019-09-01 PROCEDURE — 93010 ELECTROCARDIOGRAM REPORT: CPT

## 2019-09-01 RX ORDER — ACETAMINOPHEN 500 MG
650 TABLET ORAL ONCE
Refills: 0 | Status: COMPLETED | OUTPATIENT
Start: 2019-09-01 | End: 2019-09-01

## 2019-09-01 RX ORDER — TRAMADOL HYDROCHLORIDE 50 MG/1
25 TABLET ORAL EVERY 6 HOURS
Refills: 0 | Status: DISCONTINUED | OUTPATIENT
Start: 2019-09-01 | End: 2019-09-07

## 2019-09-01 RX ORDER — SODIUM CHLORIDE 9 MG/ML
1000 INJECTION INTRAMUSCULAR; INTRAVENOUS; SUBCUTANEOUS
Refills: 0 | Status: DISCONTINUED | OUTPATIENT
Start: 2019-09-01 | End: 2019-09-03

## 2019-09-01 RX ORDER — CEPHALEXIN 500 MG
500 CAPSULE ORAL THREE TIMES A DAY
Refills: 0 | Status: DISCONTINUED | OUTPATIENT
Start: 2019-09-01 | End: 2019-09-03

## 2019-09-01 RX ORDER — TRAMADOL HYDROCHLORIDE 50 MG/1
25 TABLET ORAL ONCE
Refills: 0 | Status: DISCONTINUED | OUTPATIENT
Start: 2019-09-01 | End: 2019-09-01

## 2019-09-01 RX ORDER — ACETAMINOPHEN 500 MG
650 TABLET ORAL EVERY 6 HOURS
Refills: 0 | Status: DISCONTINUED | OUTPATIENT
Start: 2019-09-01 | End: 2019-09-11

## 2019-09-01 RX ADMIN — Medication 25 MILLIGRAM(S): at 17:24

## 2019-09-01 RX ADMIN — TRAMADOL HYDROCHLORIDE 25 MILLIGRAM(S): 50 TABLET ORAL at 16:30

## 2019-09-01 RX ADMIN — TRAMADOL HYDROCHLORIDE 25 MILLIGRAM(S): 50 TABLET ORAL at 21:07

## 2019-09-01 RX ADMIN — TICAGRELOR 60 MILLIGRAM(S): 90 TABLET ORAL at 17:23

## 2019-09-01 RX ADMIN — Medication 100 MILLIGRAM(S): at 13:03

## 2019-09-01 RX ADMIN — Medication 650 MILLIGRAM(S): at 10:05

## 2019-09-01 RX ADMIN — TRAMADOL HYDROCHLORIDE 25 MILLIGRAM(S): 50 TABLET ORAL at 17:15

## 2019-09-01 RX ADMIN — TRAMADOL HYDROCHLORIDE 25 MILLIGRAM(S): 50 TABLET ORAL at 22:07

## 2019-09-01 RX ADMIN — Medication 650 MILLIGRAM(S): at 04:30

## 2019-09-01 RX ADMIN — Medication 100 MILLIGRAM(S): at 06:43

## 2019-09-01 RX ADMIN — Medication 650 MILLIGRAM(S): at 10:50

## 2019-09-01 RX ADMIN — ATORVASTATIN CALCIUM 80 MILLIGRAM(S): 80 TABLET, FILM COATED ORAL at 21:08

## 2019-09-01 RX ADMIN — BENZOCAINE AND MENTHOL 1 LOZENGE: 5; 1 LIQUID ORAL at 17:20

## 2019-09-01 RX ADMIN — AMLODIPINE BESYLATE 5 MILLIGRAM(S): 2.5 TABLET ORAL at 06:42

## 2019-09-01 RX ADMIN — TICAGRELOR 60 MILLIGRAM(S): 90 TABLET ORAL at 06:42

## 2019-09-01 RX ADMIN — Medication 6: at 12:50

## 2019-09-01 RX ADMIN — Medication 4: at 18:50

## 2019-09-01 RX ADMIN — Medication 100 MILLIGRAM(S): at 21:08

## 2019-09-01 RX ADMIN — Medication 500 MILLIGRAM(S): at 21:08

## 2019-09-01 RX ADMIN — Medication 650 MILLIGRAM(S): at 04:00

## 2019-09-01 RX ADMIN — SODIUM CHLORIDE 50 MILLILITER(S): 9 INJECTION INTRAMUSCULAR; INTRAVENOUS; SUBCUTANEOUS at 13:03

## 2019-09-01 RX ADMIN — BENZOCAINE AND MENTHOL 1 LOZENGE: 5; 1 LIQUID ORAL at 09:41

## 2019-09-01 RX ADMIN — Medication 100 MILLIGRAM(S): at 06:42

## 2019-09-01 RX ADMIN — Medication 9 UNIT(S): at 08:23

## 2019-09-01 RX ADMIN — BENZOCAINE AND MENTHOL 1 LOZENGE: 5; 1 LIQUID ORAL at 12:57

## 2019-09-01 RX ADMIN — Medication 9 UNIT(S): at 12:51

## 2019-09-01 RX ADMIN — BENZOCAINE AND MENTHOL 1 LOZENGE: 5; 1 LIQUID ORAL at 03:21

## 2019-09-01 RX ADMIN — Medication 9 UNIT(S): at 18:51

## 2019-09-01 RX ADMIN — RIVAROXABAN 15 MILLIGRAM(S): KIT at 18:58

## 2019-09-01 RX ADMIN — Medication 25 MILLIGRAM(S): at 06:42

## 2019-09-01 RX ADMIN — INSULIN GLARGINE 36 UNIT(S): 100 INJECTION, SOLUTION SUBCUTANEOUS at 22:00

## 2019-09-01 RX ADMIN — Medication 100 MILLIGRAM(S): at 17:19

## 2019-09-01 NOTE — PROGRESS NOTE ADULT - SUBJECTIVE AND OBJECTIVE BOX
CC: f/u for possible R ankle cellulitis    Patient reports R ankle discomfort resolved, now with L thigh pain    REVIEW OF SYSTEMS:  All other review of systems negative (Comprehensive ROS)    Antimicrobials Day # 4  ceFAZolin   IVPB 1000 milliGRAM(s) IV Intermittent every 8 hours    Other Medications Reviewed    Vital Signs Last 24 Hrs  T(F): 98.4 (01 Sep 2019 05:55), Max: 98.4 (01 Sep 2019 05:55)  HR: 79 (01 Sep 2019 05:55) (79 - 110)  BP: 144/75 (01 Sep 2019 05:55) (139/66 - 165/62)  BP(mean): --  RR: 19 (01 Sep 2019 05:55) (19 - 19)  SpO2: 97% (01 Sep 2019 05:55) (97% - 98%)    PHYSICAL EXAM:  General: alert, no acute distress  Eyes:  anicteric, no conjunctival injection, no discharge  Oropharynx: no lesions or injection 	  Neck: supple, without adenopathy  Lungs: clear to auscultation  Heart: irregular rhythm; no murmur, rubs or gallops  Abdomen: soft, nondistended, nontender, without mass or organomegaly  Skin: L thigh ecchymosis medially  Extremities: minimal R ankle edema; no erythema, nontender  Neurologic: alert, oriented, moves all extremities    LAB RESULTS:  08-31    136  |  96  |  22  ----------------------------<  178<H>  4.3   |  28  |  1.60<H>    Ca    9.5      31 Aug 2019 06:26    MICROBIOLOGY:  RECENT CULTURES:  08-30 @ 01:50 .Blood     No growth to date.    RADIOLOGY REVIEWED:  Xray Chest 2 Views PA/Lat (08.29.19 @ 18:10) >  Clear lungs.

## 2019-09-01 NOTE — CHART NOTE - NSCHARTNOTEFT_GEN_A_CORE
CC: Afib      HPI:  Called by RN to evaluate patient for elevated HR noted on routine vitals this evening. Patient seen and assessed at bedside, NAD, alert and awake. He complained of left thigh eccyhmosis. He denied headache, dizziness, chest pain, palpitations, shortness of breath, nausea, vomiting, diarrhea, abdominal pain, extremity weakness/numbness/tingling.         ROS:  CONSTITUTIONAL:  No fever, chills, rigors  CARDIOVASCULAR:  No chest pain or palpitations  RESPIRATORY:   No SOB, cough, dyspnea on exertion.  No wheezing  GASTROINTESTINAL:  No abd pain, N/V, diarrhea/constipation  EXTREMITIES:  No swelling or joint pain  GENITOURINARY:  No burning on urination, increased frequency or urgency.  No flank pain  NEUROLOGIC:  No HA, visual disturbances  SKIN: No rashes        PAST MEDICAL & SURGICAL HISTORY:  Afib  Essential hypertension, hypertension with unspecified goal  Hyperlipidemia, unspecified hyperlipidemia type  Type 2 diabetes mellitus  CAD in native artery  Former smoker  s/p Carotid Endarterectomy: left        Vital Signs Last 24 Hrs  T(C): 37.4 (01 Sep 2019 21:01), Max: 37.4 (01 Sep 2019 14:40)  T(F): 99.4 (01 Sep 2019 21:01), Max: 99.4 (01 Sep 2019 14:40)  HR: 118 (01 Sep 2019 21:01) (79 - 118)  BP: 153/89 (01 Sep 2019 22:07) (144/75 - 170/81)  RR: 18 (01 Sep 2019 21:01) (18 - 19)  SpO2: 97% (01 Sep 2019 21:01) (97% - 100%)      Physical Exam:  General: WN/WD NAD, AOx3, nontoxic appearing  Head:  NC/AT  CV: RRR, S1S2   Respiratory: CTA B/L, nonlabored  Abdominal: (+) bowel sounds x4. Soft, NT, ND, no palpable mass, no guarding, or rebound tenderness  Genitourinary: ? Earl   MSK: No BLLE edema, + peripheral pulses, FROM all 4 extremity  Skin: (+) warm, dry   Psych: Appropriate affect       Labs:    08-31    136  |  96  |  22  ----------------------------<  178<H>  4.3   |  28  |  1.60<H>    Ca    9.5      31 Aug 2019 06:26  Phos  2.6     09-01        Urinalysis Basic - ( 09-01 @ 19:58 )  Color: -- / Appearance: Negative / SG: -- / pH: >= 1000 mg/dL  Gluc: Negative / Ketone: Yellow  / Bili: -- / Urobili: --   Blood: -- / Protein: -- / Nitrite: Trace   Leuk Esterase: Negative / RBC: 1.025 / WBC --   Sq Epi: Trace / Non Sq Epi: -- / Bacteria: 6.5            Radiology:  < from: Xray Chest 2 Views PA/Lat (08.29.19 @ 18:10) >  IMPRESSION: Clear lungs.  < end of copied text >          Assessment & Plan:  HPI:  64M with PMHx of CAD (post-three stents), HTN, HLD, T2DM (complicated by peripheral neuropathy), afib on AC presenting with Le cellulitis /fever "not responding to abx " as o/p.  Pt was discharged last week  and since then noted some swelling in RLE and occasional pain... three days ago was seen in ED and was found to have cellulitis of RLE and sent on keflex... yesterday pt had a follow up with cardio and was found to have fever and was told to come to ER however he only reported to Ed today..  still c./o pain in foot though seems to be less..  erythema with some improvement   no N/V/d   no truama   and no hx of gout   in ED was found to have FS>600   met sepsis criteria   BARRY  w CR of 1.7   hyponatremia of sodium 130 (29 Aug 2019 21:10)      #Rapid AFib  -  -Case discussed with attending Dr. Mcdaniel, will place patient on telemetry.   -Cardiology is following patient   -Will continue to closely monitor patient/vitals  -Primary Team to follow up in AM, attending to follow       Sarah Atkinson PA-C  Dept of Medicine  [phone] CC: rapid Afib      HPI:  Called by RN to evaluate patient for elevated HR noted on routine vitals this evening. Patient seen and assessed at bedside, NAD, alert and awake. He complained of left thigh ecchymosis. He denied headache, dizziness, chest pain, palpitations, shortness of breath, nausea, vomiting, diarrhea, abdominal pain, extremity weakness/numbness/tingling. Patient has a known history of atrial fibrillation for which he takes Toprol XL 25mg BID and Xarelto. HR on vitals noted to be 110-130s; patient is not currently on telemetry.         ROS:  CONSTITUTIONAL:  No fever, chills, rigors  CARDIOVASCULAR:  No chest pain or palpitations  RESPIRATORY:   No SOB, cough, wheezing  GASTROINTESTINAL:  No abd pain, N/V/D  NEUROLOGIC:  No HA, visual disturbances          PAST MEDICAL & SURGICAL HISTORY:  Afib  Essential hypertension, hypertension with unspecified goal  Hyperlipidemia, unspecified hyperlipidemia type  Type 2 diabetes mellitus  CAD in native artery  Former smoker  s/p Carotid Endarterectomy: left        Vital Signs Last 24 Hrs  T(C): 37.2 (01 Sep 2019 23:15), Max: 37.4 (01 Sep 2019 14:40)  T(F): 98.9 (01 Sep 2019 23:15), Max: 99.4 (01 Sep 2019 14:40)  HR: 118 (01 Sep 2019 23:15) (79 - 118)  BP: 134/61 (01 Sep 2019 23:15) (134/61 - 170/81)  RR: 18 (01 Sep 2019 23:15) (18 - 19)  SpO2: 98% (01 Sep 2019 23:15) (97% - 100%)      Physical Exam:  General: Pleasant male laying in bed, WN/WD, NAD, AOx3, nontoxic appearing  Head:  NC/AT  CV: (+) irregularly irregular, S1S2   Respiratory: CTA B/L, nonlabored  Abdominal: (+) bowel sounds x4. Soft, NT, ND, no palpable mass, no guarding, or rebound tenderness  MSK: No BLLE edema, + peripheral pulses, FROM all 4 extremity  Skin: (+) left inner thigh ecchymosis, nontender. (+) warm, dry   Psych: Appropriate affect       Labs:    08-31    136  |  96  |  22  ----------------------------<  178<H>  4.3   |  28  |  1.60<H>    Ca    9.5      31 Aug 2019 06:26  Phos  2.6     09-01        Urinalysis Basic - ( 09-01 @ 19:58 )  Color: -- / Appearance: Negative / SG: -- / pH: >= 1000 mg/dL  Gluc: Negative / Ketone: Yellow  / Bili: -- / Urobili: --   Blood: -- / Protein: -- / Nitrite: Trace   Leuk Esterase: Negative / RBC: 1.025 / WBC --   Sq Epi: Trace / Non Sq Epi: -- / Bacteria: 6.5        Radiology:  < from: Xray Chest 2 Views PA/Lat (08.29.19 @ 18:10) >  IMPRESSION: Clear lungs.  < end of copied text >          Assessment & Plan:  64 year old male with a history of CAD S/P multiple stents, chronic AF  presents with fever, erythema swelling and pain of the right foot.  He also has HTN, DM, peripheral neuropathy.  The degree of erythema and swelling is much improved.  Patient now presenting with rapid Afib. He is asymptomatic and without complaints.     #Rapid AFib  -Vital signs hemodynamically stable, patient is afebrile  -Patient is asymptomatic and without complaints  -STAT EKG: AFib,  BPM. No acute changes noted when compared with prior EKG in [place] from 8/18.   -HR fluctuating between 100-130s; not sustaining at any given rate  -STAT BMP and Mg ordered  -Keep Mg >2.0, K >4.0  -Continue with Toprol XL 25mg PO BID; may need to administer AM dose early if HR remains elevated  -Continue with Xarelto for anticoagulation  -Case discussed with attending Dr. Mcdaniel, will place patient on telemetry for cardiac monitoring  -Per patient, he has an outpatient appointment with EP next week   -Cardiology is following patient   -Will continue to closely monitor patient/vitals  -Primary Team to follow up in AM, attending to follow     #Left thigh ecchymosis  -Area marked to monitor for progression  -Analgesia PRN   -Monitor and trend CBC  -LLE duplex pending     Sarah Atkinson PA-C  Dept of Medicine  [phone]

## 2019-09-01 NOTE — PROGRESS NOTE ADULT - SUBJECTIVE AND OBJECTIVE BOX
Dictated # [id number]  64M with PMHx of CAD (post-three stents), HTN, HLD, T2DM (complicated by peripheral neuropathy), afib on AC presenting with RLE cellulitis now has left thigh erythema and pain      BARRY DD: pre-renal vs obstructive vs AIN secondary to Ancef    Recommendations  - check UA, urine lytes  - PVR, Renal US  - NS at 50 cc/hr x 20 hours  - CPK, Phos level  - Urine eos  - If azotemia continues to rise and above work up is negative, consider switching to a non-beta lactam a non cephalosporin ABX    Thank you for the courtesy of the referral     Ashley Mayes MD  [facility]  [phone]

## 2019-09-01 NOTE — PROGRESS NOTE ADULT - ASSESSMENT
63 yo M, DM, CAD, Afib, here briefly for cough, then ED 8/26 for R ankle pain, swelling, redness, given Keflex.    Some improvement, but then low grade fever and returned 8/29    Afebrile here, WBC minimally elevated.  R ankle with only mild residual edema; no erythema.    Presentation atypical for cellulitis, no portal of entry.  Prompt resolution of erythema.  Prominent pain, would be more concerned abt crystal dz, ?primary arthritis, ?gout vs pseudogout, ?induced by diuretic use  Still no erythema, edema improved, pain resolved  L thigh ecchymosis, likely hematoma, on anticoapulation    Plan:  No objection to d/c home- will switch to po Keflex- has script at home

## 2019-09-01 NOTE — PROGRESS NOTE ADULT - ASSESSMENT
64 year old male with a history of CAD S/P multiple stents, chronic AF  presents with fever, erythema swelling and pain of the right foot.  He also has HTN, DM, peripheral neuropathy.  The degree of erythema and swelling is much improved.   In the office it was quite red over the medial malleolus. Left thigh eyrthema present ? fascial plane jethro hemorrhage from severe cramp   - cont ABX  - cont Xarelto for thromboembolism prophylaxis. has persistent af  - Cont Brillinta at lower dose per PEGASUS trial - PCI > 1 year ago  - cont metoprolol  - Stable from CV standpoint - much improved

## 2019-09-01 NOTE — PROGRESS NOTE ADULT - ASSESSMENT
Assessment /PLAN  DMT2: 64y Male with DM T2 with hyperglycemia, A1C 9% , blood sugars within acceptable range after increasing his insulin yesterday, no hypoglycemic episode, eating meals,   reports pain by incision site.  Will continue current insulin regimen. Continue to monitor FS and FU.  Cellulitis Left leg: On antibiotics, stable. ID FU   CAD: on medications, no chest pain, stable, monitored.  HTN: Controlled,  on antihypertensive medications. Assessment /PLAN  DMT2: 64y Male with DM T2 with hyperglycemia, A1C 9% ,  blood sugars within acceptable range after increasing his insulin yesterday, no hypoglycemic episode, eating meals,   reports pain by incision site.  Will continue current insulin regimen. Continue to monitor FS and FU.  Cellulitis Left leg: On antibiotics, stable. ID FU   CAD: on medications, no chest pain, stable, monitored.  HTN: Controlled,  on antihypertensive medications.

## 2019-09-01 NOTE — PROGRESS NOTE ADULT - SUBJECTIVE AND OBJECTIVE BOX
Patient is a 64y old  Male who presents with a chief complaint of rt ankle cellulitis (01 Sep 2019 12:32)                                                                REVIEW OF SYSTEMS:     CONSTITUTIONAL: No weakness, fevers or chills  EYES/ENT: No visual changes , no ear ache   NECK: No pain or stiffness  RESPIRATORY: No cough, wheezing,  No shortness of breath  CARDIOVASCULAR: No chest pain or palpitations  GASTROINTESTINAL: No abdominal pain  . No nausea, vomiting, or hematemesis; No diarrhea or constipation. No melena or hematochezia.  GENITOURINARY: No dysuria, frequency or hematuria  NEUROLOGICAL: No numbness or weakness  SKIN: No itching, burning, rashes, or lesions                                                                                                                                                                                                                                                                                 Medications:  MEDICATIONS  (STANDING):  amLODIPine   Tablet 5 milliGRAM(s) Oral daily  metoprolol succinate ER 25 milliGRAM(s) Oral two times a day    ceFAZolin   IVPB 1000 milliGRAM(s) IV Intermittent every 8 hours    benzonatate 100 milliGRAM(s) Oral three times a day  guaiFENesin   Syrup  (Sugar-Free) 100 milliGRAM(s) Oral every 6 hours PRN    acetaminophen   Tablet .. 650 milliGRAM(s) Oral every 6 hours PRN      atorvastatin 80 milliGRAM(s) Oral at bedtime  dextrose 40% Gel 15 Gram(s) Oral once PRN  dextrose 50% Injectable 12.5 Gram(s) IV Push once  dextrose 50% Injectable 25 Gram(s) IV Push once  dextrose 50% Injectable 25 Gram(s) IV Push once  glucagon  Injectable 1 milliGRAM(s) IntraMuscular once PRN  insulin glargine Injectable (LANTUS) 36 Unit(s) SubCutaneous at bedtime  insulin lispro (HumaLOG) corrective regimen sliding scale   SubCutaneous three times a day before meals  insulin lispro (HumaLOG) corrective regimen sliding scale   SubCutaneous at bedtime  insulin lispro Injectable (HumaLOG) 9 Unit(s) SubCutaneous three times a day before meals    benzocaine 15 mG/menthol 3.6 mG (Sugar-Free) Lozenge 1 Lozenge Oral four times a day PRN  dextrose 5%. 1000 milliLiter(s) IV Continuous <Continuous>  rivaroxaban 15 milliGRAM(s) Oral with dinner  sodium chloride 0.9%. 1000 milliLiter(s) IV Continuous <Continuous>     Allergies    No Known Allergies    Intolerances      Vital Signs Last 24 Hrs  T(C): 36.9 (09-01-19 @ 05:55), Max: 37.3 (08-31-19 @ 13:53)  HR: 79 (09-01-19 @ 05:55) (79 - 110)  BP: 144/75 (09-01-19 @ 05:55) (139/66 - 165/62)  RR: 19 (09-01-19 @ 05:55) (19 - 20)  SpO2: 97% (09-01-19 @ 05:55) (97% - 99%)  Wt(kg): --    Physical Exam:    General:  Well appearing, NAD, not cachetic  HEENT:  Nonicteric, PERRLA  CV:  RRR, S1S2   Lungs:  CTA B/L, no wheezes, rales, rhonchi  Abdomen:  Soft, non-tender, no distended, positive BS  Extremities:  L thihgh hematomoa  Skin:  Warm and dry, no rashes  :  No moreno  Neuro:  AAOx3, non-focal, grossly intact                                                                                                                                                                                                                                                                                                LABS:                                08-31    136  |  96  |  22  ----------------------------<  178<H>  4.3   |  28  |  1.60<H>    Ca    9.5      31 Aug 2019 06:26

## 2019-09-01 NOTE — PROGRESS NOTE ADULT - SUBJECTIVE AND OBJECTIVE BOX
SUBJECTIVE:  No Cp or SOB    MEDICATIONS:  amLODIPine   Tablet 5 milliGRAM(s) Oral daily  metoprolol succinate ER 25 milliGRAM(s) Oral two times a day    ceFAZolin   IVPB 1000 milliGRAM(s) IV Intermittent every 8 hours    benzonatate 100 milliGRAM(s) Oral three times a day  guaiFENesin   Syrup  (Sugar-Free) 100 milliGRAM(s) Oral every 6 hours PRN    acetaminophen   Tablet .. 650 milliGRAM(s) Oral every 6 hours PRN      atorvastatin 80 milliGRAM(s) Oral at bedtime  dextrose 40% Gel 15 Gram(s) Oral once PRN  dextrose 50% Injectable 12.5 Gram(s) IV Push once  dextrose 50% Injectable 25 Gram(s) IV Push once  dextrose 50% Injectable 25 Gram(s) IV Push once  glucagon  Injectable 1 milliGRAM(s) IntraMuscular once PRN  insulin glargine Injectable (LANTUS) 36 Unit(s) SubCutaneous at bedtime  insulin lispro (HumaLOG) corrective regimen sliding scale   SubCutaneous three times a day before meals  insulin lispro (HumaLOG) corrective regimen sliding scale   SubCutaneous at bedtime  insulin lispro Injectable (HumaLOG) 9 Unit(s) SubCutaneous three times a day before meals    benzocaine 15 mG/menthol 3.6 mG (Sugar-Free) Lozenge 1 Lozenge Oral four times a day PRN  dextrose 5%. 1000 milliLiter(s) IV Continuous <Continuous>  rivaroxaban 15 milliGRAM(s) Oral with dinner  sodium chloride 0.9%. 1000 milliLiter(s) IV Continuous <Continuous>  ticagrelor 60 milliGRAM(s) Oral every 12 hours      REVIEW OF SYSTEMS:    CONSTITUTIONAL: No fever, weight loss, or fatigue  EYES: No eye pain, visual disturbances, or discharge  NECK: No pain or stiffness  RESPIRATORY: No cough, wheezing, chills or hemoptysis; No Shortness of Breath  CARDIOVASCULAR: No chest pain, palpitations, dizziness, or leg swelling  GASTROINTESTINAL: No abdominal or epigastric pain. No nausea, vomiting, or hematemesis; No diarrhea or constipation. No melena or hematochezia.  GENITOURINARY: No dysuria, frequency, hematuria, or incontinence  NEUROLOGICAL: No headaches, memory loss, loss of strength, numbness, or tremors  SKIN: No itching, burning, rashes, or lesions   LYMPH Nodes: No enlarged glands  MUSCULOSKELETAL: No joint pain or swelling; No muscle, back, or extremity pain  All other review of systems are negative.  	  [ ] Unable to obtain    PHYSICAL EXAM:  T(C): 36.9 (09-01-19 @ 05:55), Max: 37.3 (08-31-19 @ 13:53)  HR: 79 (09-01-19 @ 05:55) (79 - 110)  BP: 144/75 (09-01-19 @ 05:55) (139/66 - 165/62)  RR: 19 (09-01-19 @ 05:55) (19 - 20)  SpO2: 97% (09-01-19 @ 05:55) (97% - 99%)  Wt(kg): --  I&O's Summary        PHYSICAL EXAM    Appearance: Normal	  HEENT:   Normal oral mucosa, PERRL, EOMI	  NECK: Soft and supple, No LAD, No JVD  Cardiovascular: Regular Rate and Rhythm, Normal S1 S2, No murmurs, No clicks, gallops or rubs  Respiratory: Lungs clear to auscultation	  Gastrointestinal:  Soft, Non-tender, + BS	  Skin: No rashes, No ecchymoses, No cyanosis  Neurologic: Non-focal  Extremities: No clubbing, cyanosis or edema  Vascular: Peripheral pulses palpable 2+ bilaterally    LABS:	 	      08-31    136  |  96  |  22  ----------------------------<  178<H>  4.3   |  28  |  1.60<H>    Ca    9.5      31 Aug 2019 06:26

## 2019-09-01 NOTE — PROGRESS NOTE ADULT - SUBJECTIVE AND OBJECTIVE BOX
Chief complaint  Patient is a 64y old  Male who presents with a chief complaint of Cellulitis (01 Sep 2019 12:01)   Review of systems  Patient in bed, looks comfortable, no fever, no hypoglycemia.    Labs and Fingersticks  CAPILLARY BLOOD GLUCOSE      POCT Blood Glucose.: 293 mg/dL (01 Sep 2019 12:42)  POCT Blood Glucose.: 99 mg/dL (01 Sep 2019 08:08)  POCT Blood Glucose.: 258 mg/dL (31 Aug 2019 21:41)  POCT Blood Glucose.: 168 mg/dL (31 Aug 2019 18:27)      Anion Gap, Serum: 12 (08-31 @ 06:26)      Calcium, Total Serum: 9.5 (08-31 @ 06:26)          08-31    136  |  96  |  22  ----------------------------<  178<H>  4.3   |  28  |  1.60<H>    Ca    9.5      31 Aug 2019 06:26      Medications  MEDICATIONS  (STANDING):  amLODIPine   Tablet 5 milliGRAM(s) Oral daily  atorvastatin 80 milliGRAM(s) Oral at bedtime  benzonatate 100 milliGRAM(s) Oral three times a day  cephalexin 500 milliGRAM(s) Oral three times a day  dextrose 5%. 1000 milliLiter(s) (50 mL/Hr) IV Continuous <Continuous>  dextrose 50% Injectable 12.5 Gram(s) IV Push once  dextrose 50% Injectable 25 Gram(s) IV Push once  dextrose 50% Injectable 25 Gram(s) IV Push once  insulin glargine Injectable (LANTUS) 36 Unit(s) SubCutaneous at bedtime  insulin lispro (HumaLOG) corrective regimen sliding scale   SubCutaneous three times a day before meals  insulin lispro (HumaLOG) corrective regimen sliding scale   SubCutaneous at bedtime  insulin lispro Injectable (HumaLOG) 9 Unit(s) SubCutaneous three times a day before meals  metoprolol succinate ER 25 milliGRAM(s) Oral two times a day  rivaroxaban 15 milliGRAM(s) Oral with dinner  sodium chloride 0.9%. 1000 milliLiter(s) (50 mL/Hr) IV Continuous <Continuous>  ticagrelor 60 milliGRAM(s) Oral every 12 hours      Physical Exam  General: Patient comfortable in bed  Vital Signs Last 12 Hrs  T(F): 99.4 (09-01-19 @ 14:40), Max: 99.4 (09-01-19 @ 14:40)  HR: 110 (09-01-19 @ 14:40) (79 - 110)  BP: 159/80 (09-01-19 @ 14:40) (144/75 - 159/80)  BP(mean): --  RR: 18 (09-01-19 @ 14:40) (18 - 19)  SpO2: 100% (09-01-19 @ 14:40) (97% - 100%)  Neck: No palpable thyroid nodules.  CVS: S1S2, No murmurs  Respiratory: No wheezing, no crepitations  GI: Abdomen soft, bowel sounds positive  Musculoskeletal:  edema lower extremities.   Skin: No skin rashes, no ecchymosis    Diagnostics Chief complaint  Patient is a 64y old  Male who presents with a chief complaint of Cellulitis (01 Sep 2019 12:01)   Review of systems  Patient in bed, looks comfortable, no fever,no hypoglycemia.    Labs and Fingersticks  CAPILLARY BLOOD GLUCOSE      POCT Blood Glucose.: 293 mg/dL (01 Sep 2019 12:42)  POCT Blood Glucose.: 99 mg/dL (01 Sep 2019 08:08)  POCT Blood Glucose.: 258 mg/dL (31 Aug 2019 21:41)  POCT Blood Glucose.: 168 mg/dL (31 Aug 2019 18:27)      Anion Gap, Serum: 12 (08-31 @ 06:26)      Calcium, Total Serum: 9.5 (08-31 @ 06:26)          08-31    136  |  96  |  22  ----------------------------<  178<H>  4.3   |  28  |  1.60<H>    Ca    9.5      31 Aug 2019 06:26      Medications  MEDICATIONS  (STANDING):  amLODIPine   Tablet 5 milliGRAM(s) Oral daily  atorvastatin 80 milliGRAM(s) Oral at bedtime  benzonatate 100 milliGRAM(s) Oral three times a day  cephalexin 500 milliGRAM(s) Oral three times a day  dextrose 5%. 1000 milliLiter(s) (50 mL/Hr) IV Continuous <Continuous>  dextrose 50% Injectable 12.5 Gram(s) IV Push once  dextrose 50% Injectable 25 Gram(s) IV Push once  dextrose 50% Injectable 25 Gram(s) IV Push once  insulin glargine Injectable (LANTUS) 36 Unit(s) SubCutaneous at bedtime  insulin lispro (HumaLOG) corrective regimen sliding scale   SubCutaneous three times a day before meals  insulin lispro (HumaLOG) corrective regimen sliding scale   SubCutaneous at bedtime  insulin lispro Injectable (HumaLOG) 9 Unit(s) SubCutaneous three times a day before meals  metoprolol succinate ER 25 milliGRAM(s) Oral two times a day  rivaroxaban 15 milliGRAM(s) Oral with dinner  sodium chloride 0.9%. 1000 milliLiter(s) (50 mL/Hr) IV Continuous <Continuous>  ticagrelor 60 milliGRAM(s) Oral every 12 hours      Physical Exam  General: Patient comfortable in bed  Vital Signs Last 12 Hrs  T(F): 99.4 (09-01-19 @ 14:40), Max: 99.4 (09-01-19 @ 14:40)  HR: 110 (09-01-19 @ 14:40) (79 - 110)  BP: 159/80 (09-01-19 @ 14:40) (144/75 - 159/80)  BP(mean): --  RR: 18 (09-01-19 @ 14:40) (18 - 19)  SpO2: 100% (09-01-19 @ 14:40) (97% - 100%)  Neck: No palpable thyroid nodules.  CVS: S1S2, No murmurs  Respiratory: No wheezing, no crepitations  GI: Abdomen soft, bowel sounds positive  Musculoskeletal:  edema lower extremities.   Skin: No skin rashes, no ecchymosis    Diagnostics

## 2019-09-01 NOTE — PROGRESS NOTE ADULT - ASSESSMENT
64M with PMHx of CAD (post-three stents), HTN, HLD, T2DM (complicated by peripheral neuropathy), afib on AC presenting with Le cellulitis /fever "not responding to abx " as o/p.  Pt was discharged last week  and since then noted some swelling in RLE and occasional pain... three days ago was seen in ED and was found to have cellulitis of RLE and sent on keflex... yesterday pt had a follow up with cardio and was found to have fever and was told to come to ER however he only reported to Ed today..  still c./o pain in foot though seems to be less..  erythema with some improvement   no N/V/d   no truama   and no hx of gout   in ED was found to have FS>600   met sepsis criteria   BARRY  w CR of 1.7   hyponatremia of sodium 130    1- sepsis sec to cellulitis :  blood culture  neg  cont abx   f/u with ID   doubt DVT  .. US done two days ago neg ...pt on AC     2- hyponatremia : likley pseudohyponatermia  improved     3- BARRY : sec to sepsis   hold lasix / ACE for now   still with Elevated Cr   f/u  US and bladder scan   renal input     4- Afib : rate control   cont AC     5- HTN cont bp meds and monitor     6- DM: uncontrolled  A1c 8.7  FS >600   now improved with humalog   f/u with endo     7- cough :   unclear if residual from ifection however seems to coincide with starting aCE   will hold and re-evaluate as o/p.

## 2019-09-02 LAB
ANION GAP SERPL CALC-SCNC: 13 MMOL/L — SIGNIFICANT CHANGE UP (ref 5–17)
ANION GAP SERPL CALC-SCNC: 15 MMOL/L — SIGNIFICANT CHANGE UP (ref 5–17)
ANISOCYTOSIS BLD QL: SLIGHT — SIGNIFICANT CHANGE UP
BASOPHILS # BLD AUTO: 0.06 K/UL — SIGNIFICANT CHANGE UP (ref 0–0.2)
BASOPHILS NFR BLD AUTO: 0.4 % — SIGNIFICANT CHANGE UP (ref 0–2)
BUN SERPL-MCNC: 19 MG/DL — SIGNIFICANT CHANGE UP (ref 7–23)
BUN SERPL-MCNC: 23 MG/DL — SIGNIFICANT CHANGE UP (ref 7–23)
CALCIUM SERPL-MCNC: 10.2 MG/DL — SIGNIFICANT CHANGE UP (ref 8.4–10.5)
CALCIUM SERPL-MCNC: 9.3 MG/DL — SIGNIFICANT CHANGE UP (ref 8.4–10.5)
CHLORIDE SERPL-SCNC: 98 MMOL/L — SIGNIFICANT CHANGE UP (ref 96–108)
CHLORIDE SERPL-SCNC: 98 MMOL/L — SIGNIFICANT CHANGE UP (ref 96–108)
CO2 SERPL-SCNC: 20 MMOL/L — LOW (ref 22–31)
CO2 SERPL-SCNC: 26 MMOL/L — SIGNIFICANT CHANGE UP (ref 22–31)
CREAT SERPL-MCNC: 1.19 MG/DL — SIGNIFICANT CHANGE UP (ref 0.5–1.3)
CREAT SERPL-MCNC: 1.33 MG/DL — HIGH (ref 0.5–1.3)
EOSINOPHIL # BLD AUTO: 0.38 K/UL — SIGNIFICANT CHANGE UP (ref 0–0.5)
EOSINOPHIL NFR BLD AUTO: 2.5 % — SIGNIFICANT CHANGE UP (ref 0–6)
GLUCOSE BLDC GLUCOMTR-MCNC: 168 MG/DL — HIGH (ref 70–99)
GLUCOSE BLDC GLUCOMTR-MCNC: 175 MG/DL — HIGH (ref 70–99)
GLUCOSE BLDC GLUCOMTR-MCNC: 203 MG/DL — HIGH (ref 70–99)
GLUCOSE BLDC GLUCOMTR-MCNC: 256 MG/DL — HIGH (ref 70–99)
GLUCOSE BLDC GLUCOMTR-MCNC: 64 MG/DL — LOW (ref 70–99)
GLUCOSE BLDC GLUCOMTR-MCNC: 66 MG/DL — LOW (ref 70–99)
GLUCOSE BLDC GLUCOMTR-MCNC: 79 MG/DL — SIGNIFICANT CHANGE UP (ref 70–99)
GLUCOSE SERPL-MCNC: 227 MG/DL — HIGH (ref 70–99)
GLUCOSE SERPL-MCNC: 261 MG/DL — HIGH (ref 70–99)
HCT VFR BLD CALC: 34.7 % — LOW (ref 39–50)
HCT VFR BLD CALC: 41.6 % — SIGNIFICANT CHANGE UP (ref 39–50)
HGB BLD-MCNC: 11.7 G/DL — LOW (ref 13–17)
HGB BLD-MCNC: 13.7 G/DL — SIGNIFICANT CHANGE UP (ref 13–17)
IMM GRANULOCYTES NFR BLD AUTO: 0.5 % — SIGNIFICANT CHANGE UP (ref 0–1.5)
LYMPHOCYTES # BLD AUTO: 13.4 % — SIGNIFICANT CHANGE UP (ref 13–44)
LYMPHOCYTES # BLD AUTO: 2.04 K/UL — SIGNIFICANT CHANGE UP (ref 1–3.3)
MAGNESIUM SERPL-MCNC: 1.9 MG/DL — SIGNIFICANT CHANGE UP (ref 1.6–2.6)
MANUAL SMEAR VERIFICATION: SIGNIFICANT CHANGE UP
MCHC RBC-ENTMCNC: 29.8 PG — SIGNIFICANT CHANGE UP (ref 27–34)
MCHC RBC-ENTMCNC: 30.1 PG — SIGNIFICANT CHANGE UP (ref 27–34)
MCHC RBC-ENTMCNC: 32.9 GM/DL — SIGNIFICANT CHANGE UP (ref 32–36)
MCHC RBC-ENTMCNC: 33.7 GM/DL — SIGNIFICANT CHANGE UP (ref 32–36)
MCV RBC AUTO: 88.3 FL — SIGNIFICANT CHANGE UP (ref 80–100)
MCV RBC AUTO: 91.5 FL — SIGNIFICANT CHANGE UP (ref 80–100)
MICROCYTES BLD QL: SLIGHT — SIGNIFICANT CHANGE UP
MONOCYTES # BLD AUTO: 1.54 K/UL — HIGH (ref 0–0.9)
MONOCYTES NFR BLD AUTO: 10.1 % — SIGNIFICANT CHANGE UP (ref 2–14)
NEUTROPHILS # BLD AUTO: 11.09 K/UL — HIGH (ref 1.8–7.4)
NEUTROPHILS NFR BLD AUTO: 73.1 % — SIGNIFICANT CHANGE UP (ref 43–77)
PLAT MORPH BLD: NORMAL — SIGNIFICANT CHANGE UP
PLATELET # BLD AUTO: 333 K/UL — SIGNIFICANT CHANGE UP (ref 150–400)
PLATELET # BLD AUTO: 429 K/UL — HIGH (ref 150–400)
POTASSIUM SERPL-MCNC: 4.2 MMOL/L — SIGNIFICANT CHANGE UP (ref 3.5–5.3)
POTASSIUM SERPL-MCNC: 4.6 MMOL/L — SIGNIFICANT CHANGE UP (ref 3.5–5.3)
POTASSIUM SERPL-SCNC: 4.2 MMOL/L — SIGNIFICANT CHANGE UP (ref 3.5–5.3)
POTASSIUM SERPL-SCNC: 4.6 MMOL/L — SIGNIFICANT CHANGE UP (ref 3.5–5.3)
RBC # BLD: 3.93 M/UL — LOW (ref 4.2–5.8)
RBC # BLD: 4.54 M/UL — SIGNIFICANT CHANGE UP (ref 4.2–5.8)
RBC # FLD: 12.1 % — SIGNIFICANT CHANGE UP (ref 10.3–14.5)
RBC # FLD: 12.5 % — SIGNIFICANT CHANGE UP (ref 10.3–14.5)
RBC BLD AUTO: ABNORMAL
SODIUM SERPL-SCNC: 133 MMOL/L — LOW (ref 135–145)
SODIUM SERPL-SCNC: 137 MMOL/L — SIGNIFICANT CHANGE UP (ref 135–145)
WBC # BLD: 15.19 K/UL — HIGH (ref 3.8–10.5)
WBC # BLD: 20 K/UL — HIGH (ref 3.8–10.5)
WBC # FLD AUTO: 15.19 K/UL — HIGH (ref 3.8–10.5)
WBC # FLD AUTO: 20 K/UL — HIGH (ref 3.8–10.5)

## 2019-09-02 PROCEDURE — 99253 IP/OBS CNSLTJ NEW/EST LOW 45: CPT

## 2019-09-02 PROCEDURE — 93971 EXTREMITY STUDY: CPT | Mod: 26

## 2019-09-02 PROCEDURE — 76882 US LMTD JT/FCL EVL NVASC XTR: CPT | Mod: 26,LT,59

## 2019-09-02 PROCEDURE — 76770 US EXAM ABDO BACK WALL COMP: CPT | Mod: 26

## 2019-09-02 RX ORDER — INSULIN LISPRO 100/ML
6 VIAL (ML) SUBCUTANEOUS
Refills: 0 | Status: DISCONTINUED | OUTPATIENT
Start: 2019-09-02 | End: 2019-09-06

## 2019-09-02 RX ORDER — INSULIN GLARGINE 100 [IU]/ML
32 INJECTION, SOLUTION SUBCUTANEOUS AT BEDTIME
Refills: 0 | Status: DISCONTINUED | OUTPATIENT
Start: 2019-09-02 | End: 2019-09-08

## 2019-09-02 RX ORDER — ASPIRIN/CALCIUM CARB/MAGNESIUM 324 MG
81 TABLET ORAL DAILY
Refills: 0 | Status: DISCONTINUED | OUTPATIENT
Start: 2019-09-02 | End: 2019-09-11

## 2019-09-02 RX ADMIN — Medication 2: at 12:11

## 2019-09-02 RX ADMIN — Medication 9 UNIT(S): at 12:11

## 2019-09-02 RX ADMIN — Medication 25 MILLIGRAM(S): at 18:24

## 2019-09-02 RX ADMIN — Medication 650 MILLIGRAM(S): at 21:12

## 2019-09-02 RX ADMIN — BENZOCAINE AND MENTHOL 1 LOZENGE: 5; 1 LIQUID ORAL at 01:24

## 2019-09-02 RX ADMIN — TRAMADOL HYDROCHLORIDE 25 MILLIGRAM(S): 50 TABLET ORAL at 10:16

## 2019-09-02 RX ADMIN — RIVAROXABAN 15 MILLIGRAM(S): KIT at 18:24

## 2019-09-02 RX ADMIN — Medication 9 UNIT(S): at 08:38

## 2019-09-02 RX ADMIN — Medication 100 MILLIGRAM(S): at 05:06

## 2019-09-02 RX ADMIN — TRAMADOL HYDROCHLORIDE 25 MILLIGRAM(S): 50 TABLET ORAL at 22:15

## 2019-09-02 RX ADMIN — Medication 500 MILLIGRAM(S): at 05:06

## 2019-09-02 RX ADMIN — Medication 25 MILLIGRAM(S): at 05:06

## 2019-09-02 RX ADMIN — TRAMADOL HYDROCHLORIDE 25 MILLIGRAM(S): 50 TABLET ORAL at 11:16

## 2019-09-02 RX ADMIN — AMLODIPINE BESYLATE 5 MILLIGRAM(S): 2.5 TABLET ORAL at 05:06

## 2019-09-02 RX ADMIN — TRAMADOL HYDROCHLORIDE 25 MILLIGRAM(S): 50 TABLET ORAL at 23:15

## 2019-09-02 RX ADMIN — Medication 500 MILLIGRAM(S): at 21:53

## 2019-09-02 RX ADMIN — TRAMADOL HYDROCHLORIDE 25 MILLIGRAM(S): 50 TABLET ORAL at 02:52

## 2019-09-02 RX ADMIN — Medication 100 MILLIGRAM(S): at 14:45

## 2019-09-02 RX ADMIN — Medication 81 MILLIGRAM(S): at 11:43

## 2019-09-02 RX ADMIN — Medication 100 MILLIGRAM(S): at 21:53

## 2019-09-02 RX ADMIN — Medication 650 MILLIGRAM(S): at 05:24

## 2019-09-02 RX ADMIN — Medication 500 MILLIGRAM(S): at 14:45

## 2019-09-02 RX ADMIN — Medication 650 MILLIGRAM(S): at 20:42

## 2019-09-02 RX ADMIN — BENZOCAINE AND MENTHOL 1 LOZENGE: 5; 1 LIQUID ORAL at 23:21

## 2019-09-02 RX ADMIN — Medication 2: at 21:51

## 2019-09-02 RX ADMIN — ATORVASTATIN CALCIUM 80 MILLIGRAM(S): 80 TABLET, FILM COATED ORAL at 21:53

## 2019-09-02 RX ADMIN — TICAGRELOR 60 MILLIGRAM(S): 90 TABLET ORAL at 05:06

## 2019-09-02 RX ADMIN — Medication 650 MILLIGRAM(S): at 06:24

## 2019-09-02 RX ADMIN — TRAMADOL HYDROCHLORIDE 25 MILLIGRAM(S): 50 TABLET ORAL at 03:52

## 2019-09-02 RX ADMIN — INSULIN GLARGINE 32 UNIT(S): 100 INJECTION, SOLUTION SUBCUTANEOUS at 21:50

## 2019-09-02 RX ADMIN — BENZOCAINE AND MENTHOL 1 LOZENGE: 5; 1 LIQUID ORAL at 12:11

## 2019-09-02 RX ADMIN — Medication 2: at 08:38

## 2019-09-02 NOTE — PROGRESS NOTE ADULT - SUBJECTIVE AND OBJECTIVE BOX
Patient seen and examined in bed. No new complaints.       MEDICATIONS  (STANDING):  amLODIPine   Tablet 5 milliGRAM(s) Oral daily  aspirin enteric coated 81 milliGRAM(s) Oral daily  atorvastatin 80 milliGRAM(s) Oral at bedtime  benzonatate 100 milliGRAM(s) Oral three times a day  cephalexin 500 milliGRAM(s) Oral three times a day  dextrose 5%. 1000 milliLiter(s) (50 mL/Hr) IV Continuous <Continuous>  dextrose 50% Injectable 12.5 Gram(s) IV Push once  dextrose 50% Injectable 25 Gram(s) IV Push once  dextrose 50% Injectable 25 Gram(s) IV Push once  insulin glargine Injectable (LANTUS) 36 Unit(s) SubCutaneous at bedtime  insulin lispro (HumaLOG) corrective regimen sliding scale   SubCutaneous three times a day before meals  insulin lispro (HumaLOG) corrective regimen sliding scale   SubCutaneous at bedtime  insulin lispro Injectable (HumaLOG) 9 Unit(s) SubCutaneous three times a day before meals  metoprolol succinate ER 25 milliGRAM(s) Oral two times a day  rivaroxaban 15 milliGRAM(s) Oral with dinner  sodium chloride 0.9%. 1000 milliLiter(s) (50 mL/Hr) IV Continuous <Continuous>      VITAL:  T(C): , Max: 37.4 (19 @ 21:01)  T(F): , Max: 99.4 (19 @ 21:01)  HR: 113 (19 @ 13:55)  BP: 111/73 (19 @ 13:55)  RR: 20 (19 @ 13:55)  SpO2: 98% (19 @ 13:55)    I and O's:     @ :  -   @ 07:00  --------------------------------------------------------  IN: 1740 mL / OUT: 0 mL / NET: 1740 mL     @ 07:01  -   @ 15:55  --------------------------------------------------------  IN: 480 mL / OUT: 0 mL / NET: 480 mL          PHYSICAL EXAM:    Constitutional: NAD  Neck:  No JVD  Respiratory: CTAB/L  Cardiovascular: S1 and S2, tachy  Gastrointestinal: BS+, soft, NT/ND  Extremities: No peripheral edema  Neurological: A/O x 3, no focal deficits  Psychiatric: Normal mood, normal affect  : No Earl  Skin: No rashes    LABS:                        13.7   20.0  )-----------( 429      ( 02 Sep 2019 10:08 )             41.6     09-02    137  |  98  |  19  ----------------------------<  261<H>  4.6   |  26  |  1.19    Ca    10.2      02 Sep 2019 10:08  Phos  2.6       Mg     1.9             Urine Studies:  Urinalysis Basic - ( 01 Sep 2019 19:58 )    Color: Yellow / Appearance: Clear / S.025 / pH: x  Gluc: x / Ketone: Trace  / Bili: Negative / Urobili: <2 mg/dL   Blood: x / Protein: Trace / Nitrite: Negative   Leuk Esterase: Negative / RBC: x / WBC x   Sq Epi: x / Non Sq Epi: x / Bacteria: x      Sodium, Random Urine: 61 mmol/L ( @ 15:11)  Creatinine, Random Urine: 70 mg/dL ( @ 15:11)      ASSESSMENT:  64M with PMHx of CAD (post-three stents), HTN, HLD, T2DM (complicated by peripheral neuropathy), afib on AC presenting with RLE cellulitis now has left thigh erythema and pain    BARRY DD: likely pre-renal azotemia - now improved s/p gentle IVF    RECOMMENDATIONS:  - BMP daily  - Dose new meds for GFR >60 ml/min      MARY YoungC  [facility] [employer]  [phone]

## 2019-09-02 NOTE — PROGRESS NOTE ADULT - SUBJECTIVE AND OBJECTIVE BOX
Chief complaint  Patient is a 64y old  Male who presents with a chief complaint of Cellulitis (02 Sep 2019 14:44)   Review of systems  Patient in bed, looks comfortable, no fever, had hypoglycemia.    Labs and Fingersticks  CAPILLARY BLOOD GLUCOSE      POCT Blood Glucose.: 64 mg/dL (02 Sep 2019 18:21)  POCT Blood Glucose.: 79 mg/dL (02 Sep 2019 17:57)  POCT Blood Glucose.: 66 mg/dL (02 Sep 2019 17:56)  POCT Blood Glucose.: 175 mg/dL (02 Sep 2019 12:03)  POCT Blood Glucose.: 168 mg/dL (02 Sep 2019 08:35)  POCT Blood Glucose.: 195 mg/dL (01 Sep 2019 21:48)  POCT Blood Glucose.: 223 mg/dL (01 Sep 2019 18:40)      Anion Gap, Serum: 13 (09-02 @ 10:08)  Anion Gap, Serum: 15 (09-02 @ 00:18)      Calcium, Total Serum: 10.2 (09-02 @ 10:08)  Calcium, Total Serum: 9.3 (09-02 @ 00:18)          09-02    137  |  98  |  19  ----------------------------<  261<H>  4.6   |  26  |  1.19    Ca    10.2      02 Sep 2019 10:08  Phos  2.6     09-01  Mg     1.9     09-02                          13.7   20.0  )-----------( 429      ( 02 Sep 2019 10:08 )             41.6     Medications  MEDICATIONS  (STANDING):  amLODIPine   Tablet 5 milliGRAM(s) Oral daily  aspirin enteric coated 81 milliGRAM(s) Oral daily  atorvastatin 80 milliGRAM(s) Oral at bedtime  benzonatate 100 milliGRAM(s) Oral three times a day  cephalexin 500 milliGRAM(s) Oral three times a day  dextrose 5%. 1000 milliLiter(s) (50 mL/Hr) IV Continuous <Continuous>  dextrose 50% Injectable 12.5 Gram(s) IV Push once  dextrose 50% Injectable 25 Gram(s) IV Push once  dextrose 50% Injectable 25 Gram(s) IV Push once  insulin glargine Injectable (LANTUS) 36 Unit(s) SubCutaneous at bedtime  insulin lispro (HumaLOG) corrective regimen sliding scale   SubCutaneous three times a day before meals  insulin lispro (HumaLOG) corrective regimen sliding scale   SubCutaneous at bedtime  insulin lispro Injectable (HumaLOG) 9 Unit(s) SubCutaneous three times a day before meals  metoprolol succinate ER 25 milliGRAM(s) Oral two times a day  rivaroxaban 15 milliGRAM(s) Oral with dinner  sodium chloride 0.9%. 1000 milliLiter(s) (50 mL/Hr) IV Continuous <Continuous>      Physical Exam  General: Patient comfortable in bed  Vital Signs Last 12 Hrs  T(F): 99.2 (09-02-19 @ 13:55), Max: 99.2 (09-02-19 @ 13:55)  HR: 110 (09-02-19 @ 18:22) (110 - 113)  BP: 160/77 (09-02-19 @ 18:22) (111/73 - 160/77)  BP(mean): --  RR: 20 (09-02-19 @ 13:55) (20 - 20)  SpO2: 98% (09-02-19 @ 13:55) (98% - 98%)  Neck: No palpable thyroid nodules.  CVS: S1S2, No murmurs  Respiratory: No wheezing, no crepitations  GI: Abdomen soft, bowel sounds positive  Musculoskeletal:  edema lower extremities.   Skin: No skin rashes, no ecchymosis    Diagnostics

## 2019-09-02 NOTE — PROGRESS NOTE ADULT - SUBJECTIVE AND OBJECTIVE BOX
CHIEF COMPLAINT: persistent atrial fibrillation    HISTORY OF PRESENT ILLNESS:       Allergies    No Known Allergies    Intolerances    	    MEDICATIONS:  amLODIPine   Tablet 5 milliGRAM(s) Oral daily  aspirin enteric coated 81 milliGRAM(s) Oral daily  metoprolol succinate ER 25 milliGRAM(s) Oral two times a day  rivaroxaban 15 milliGRAM(s) Oral with dinner    cephalexin 500 milliGRAM(s) Oral three times a day    benzonatate 100 milliGRAM(s) Oral three times a day  guaiFENesin   Syrup  (Sugar-Free) 100 milliGRAM(s) Oral every 6 hours PRN    acetaminophen   Tablet .. 650 milliGRAM(s) Oral every 6 hours PRN  acetaminophen   Tablet .. 650 milliGRAM(s) Oral every 6 hours PRN  traMADol 25 milliGRAM(s) Oral every 6 hours PRN      atorvastatin 80 milliGRAM(s) Oral at bedtime  dextrose 40% Gel 15 Gram(s) Oral once PRN  dextrose 50% Injectable 12.5 Gram(s) IV Push once  dextrose 50% Injectable 25 Gram(s) IV Push once  dextrose 50% Injectable 25 Gram(s) IV Push once  glucagon  Injectable 1 milliGRAM(s) IntraMuscular once PRN  insulin glargine Injectable (LANTUS) 36 Unit(s) SubCutaneous at bedtime  insulin lispro (HumaLOG) corrective regimen sliding scale   SubCutaneous three times a day before meals  insulin lispro (HumaLOG) corrective regimen sliding scale   SubCutaneous at bedtime  insulin lispro Injectable (HumaLOG) 9 Unit(s) SubCutaneous three times a day before meals    benzocaine 15 mG/menthol 3.6 mG (Sugar-Free) Lozenge 1 Lozenge Oral four times a day PRN  dextrose 5%. 1000 milliLiter(s) IV Continuous <Continuous>  sodium chloride 0.9%. 1000 milliLiter(s) IV Continuous <Continuous>      PAST MEDICAL & SURGICAL HISTORY:  Afib  Essential hypertension, hypertension with unspecified goal  Hyperlipidemia, unspecified hyperlipidemia type  Type 2 diabetes mellitus  CAD in native artery  Former smoker  s/p Carotid Endarterectomy: left      FAMILY HISTORY:  Family history of heart disease: father  age 71      SOCIAL HISTORY:    [ ] Non-smoker  [ ] Smoker  [ ] Alcohol      REVIEW OF SYSTEMS:  See HPI. Otherwise, 10 point ROS done and otherwise negative.    PHYSICAL EXAM:  T(C): 37.3 (19 @ 04:30), Max: 37.4 (19 @ 14:40)  HR: 120 (19 @ 04:30) (97 - 120)  BP: 144/76 (19 @ 04:30) (134/61 - 170/81)  RR: 18 (19 @ 04:30) (18 - 18)  SpO2: 96% (19 @ 04:30) (95% - 100%)  Wt(kg): --  I&O's Summary    01 Sep 2019 07:01  -  02 Sep 2019 07:00  --------------------------------------------------------  IN: 1740 mL / OUT: 0 mL / NET: 1740 mL        Appearance: Normal	  HEENT:   Normal oral mucosa, PERRL, EOMI	  Lymphatic: No lymphadenopathy  Cardiovascular: Normal S1 S2, No JVD, No murmurs, No edema  Respiratory: Lungs clear to auscultation	  Psychiatry: A & O x 3, Mood & affect appropriate  Gastrointestinal:  Soft, Non-tender, + BS	  Skin: No rashes, No ecchymoses, No cyanosis	  Neurologic: Non-focal  Extremities: Normal range of motion, No clubbing, cyanosis or edema  Vascular: Peripheral pulses palpable 2+ bilaterally        LABS:	 	    CBC Full  -  ( 02 Sep 2019 10:08 )  WBC Count : 20.0 K/uL  Hemoglobin : 13.7 g/dL  Hematocrit : 41.6 %  Platelet Count - Automated : 429 K/uL  Mean Cell Volume : 91.5 fl  Mean Cell Hemoglobin : 30.1 pg  Mean Cell Hemoglobin Concentration : 32.9 gm/dL  Auto Neutrophil # : x  Auto Lymphocyte # : x  Auto Monocyte # : x  Auto Eosinophil # : x  Auto Basophil # : x  Auto Neutrophil % : x  Auto Lymphocyte % : x  Auto Monocyte % : x  Auto Eosinophil % : x  Auto Basophil % : x        133<L>  |  98  |  23  ----------------------------<  227<H>  4.2   |  20<L>  |  1.33<H>    Ca    9.3      02 Sep 2019 00:18  Phos  2.6       Mg     1.9             proBNP:   Lipid Profile:   HgA1c:   TSH:       CARDIAC MARKERS:            Creatine Kinase, Serum: 99 U/L (19 @ 15:12)      TELEMETRY: 	    ECG:  	  RADIOLOGY:  OTHER: 	    PREVIOUS DIAGNOSTIC TESTING:    Echocardiogram (2018):  Normal LV systolic function. Mild concentric LVH.  Mild LAE.  Mild to moderate MR.   Catheterization (2016): EF= 70%.  Normal LM.  90% Proximal LAD.  40% mid LAD.  50% D1.  Normal LCX and RCA.   	  ASSESSMENT/PLAN: CHIEF COMPLAINT: persistent atrial fibrillation    HISTORY OF PRESENT ILLNESS:       Allergies    No Known Allergies    Intolerances    	    MEDICATIONS:  amLODIPine   Tablet 5 milliGRAM(s) Oral daily  aspirin enteric coated 81 milliGRAM(s) Oral daily  metoprolol succinate ER 25 milliGRAM(s) Oral two times a day  rivaroxaban 15 milliGRAM(s) Oral with dinner    cephalexin 500 milliGRAM(s) Oral three times a day    benzonatate 100 milliGRAM(s) Oral three times a day  guaiFENesin   Syrup  (Sugar-Free) 100 milliGRAM(s) Oral every 6 hours PRN    acetaminophen   Tablet .. 650 milliGRAM(s) Oral every 6 hours PRN  acetaminophen   Tablet .. 650 milliGRAM(s) Oral every 6 hours PRN  traMADol 25 milliGRAM(s) Oral every 6 hours PRN      atorvastatin 80 milliGRAM(s) Oral at bedtime  dextrose 40% Gel 15 Gram(s) Oral once PRN  dextrose 50% Injectable 12.5 Gram(s) IV Push once  dextrose 50% Injectable 25 Gram(s) IV Push once  dextrose 50% Injectable 25 Gram(s) IV Push once  glucagon  Injectable 1 milliGRAM(s) IntraMuscular once PRN  insulin glargine Injectable (LANTUS) 36 Unit(s) SubCutaneous at bedtime  insulin lispro (HumaLOG) corrective regimen sliding scale   SubCutaneous three times a day before meals  insulin lispro (HumaLOG) corrective regimen sliding scale   SubCutaneous at bedtime  insulin lispro Injectable (HumaLOG) 9 Unit(s) SubCutaneous three times a day before meals    benzocaine 15 mG/menthol 3.6 mG (Sugar-Free) Lozenge 1 Lozenge Oral four times a day PRN  dextrose 5%. 1000 milliLiter(s) IV Continuous <Continuous>  sodium chloride 0.9%. 1000 milliLiter(s) IV Continuous <Continuous>      PAST MEDICAL & SURGICAL HISTORY:  Afib  Essential hypertension, hypertension with unspecified goal  Hyperlipidemia, unspecified hyperlipidemia type  Type 2 diabetes mellitus  CAD in native artery  Former smoker  s/p Carotid Endarterectomy: left      FAMILY HISTORY:  Family history of heart disease: father  age 71      SOCIAL HISTORY:    [ ] Non-smoker  [ ] Smoker  [ ] Alcohol    	  REVIEW OF SYSTEMS:  See HPI. Otherwise, 10 point ROS done and otherwise negative.    PHYSICAL EXAM:  T(C): 37.3 (19 @ 04:30), Max: 37.4 (19 @ 14:40)  HR: 120 (19 @ 04:30) (97 - 120)  BP: 144/76 (19 @ 04:30) (134/61 - 170/81)  RR: 18 (19 @ 04:30) (18 - 18)  SpO2: 96% (19 @ 04:30) (95% - 100%)  Wt(kg): --  I&O's Summary    01 Sep 2019 07:01  -  02 Sep 2019 07:00  --------------------------------------------------------  IN: 1740 mL / OUT: 0 mL / NET: 1740 mL        Appearance: Normal	  HEENT:   Normal oral mucosa, PERRL, EOMI	  Lymphatic: No lymphadenopathy  Cardiovascular: Normal S1 S2, No JVD, No murmurs, No edema  Respiratory: Lungs clear to auscultation	  Psychiatry: A & O x 3, Mood & affect appropriate  Gastrointestinal:  Soft, Non-tender, + BS	  Skin: No rashes, No ecchymoses, No cyanosis	  Neurologic: Non-focal  Extremities: Normal range of motion, No clubbing, cyanosis or edema  Vascular: Peripheral pulses palpable 2+ bilaterally        LABS:	 	    CBC Full  -  ( 02 Sep 2019 10:08 )  WBC Count : 20.0 K/uL  Hemoglobin : 13.7 g/dL  Hematocrit : 41.6 %  Platelet Count - Automated : 429 K/uL  Mean Cell Volume : 91.5 fl  Mean Cell Hemoglobin : 30.1 pg  Mean Cell Hemoglobin Concentration : 32.9 gm/dL  Auto Neutrophil # : x  Auto Lymphocyte # : x  Auto Monocyte # : x  Auto Eosinophil # : x  Auto Basophil # : x  Auto Neutrophil % : x  Auto Lymphocyte % : x  Auto Monocyte % : x  Auto Eosinophil % : x  Auto Basophil % : x        133<L>  |  98  |  23  ----------------------------<  227<H>  4.2   |  20<L>  |  1.33<H>    Ca    9.3      02 Sep 2019 00:18  Phos  2.6     09-  Mg     1.9     -      Creatine Kinase, Serum: 99 U/L (19 @ 15:12)      TELEMETRY: 	    ECG (2019): AF with RVR.	    PREVIOUS DIAGNOSTIC TESTING:    Echocardiogram (2018):  Normal LV systolic function. Mild concentric LVH.  Mild LAE.  Mild to moderate MR.   Catheterization (2016): EF= 70%.  Normal LM.  90% Proximal LAD.  40% mid LAD.  50% D1.  Normal LCX and RCA.   	  ASSESSMENT/PLAN: CHIEF COMPLAINT: persistent atrial fibrillation    HISTORY OF PRESENT ILLNESS: 64 year old man with a history of hypertension, diabetes mellitus, CAD and atrial arrhythmia recently admitted with cellulitis.  His history of atrial arrhythmia was first observed in 2016 when he presented to his Cardiologist in AF.  He was started on Pradaxa and metoprolol and spontaneously reverted to sinus rhythm.  He subsequently discontinued oral AC secondary to gross hematuria.  With the recent observation of recurrent arrhythmia he was started on Xarelto and his metoprolol was increased.  He admits to intermittent palpitations; worse when he lays down at rest.  No chest pain. No shortness of breath.  No lightheadedness/dizziness.  He walks about 8 blocks to work daily and has not perceived any significant change in his effort tolerance.       Allergies    No Known Allergies    Intolerances    	    MEDICATIONS:  amLODIPine   Tablet 5 milliGRAM(s) Oral daily  aspirin enteric coated 81 milliGRAM(s) Oral daily  metoprolol succinate ER 25 milliGRAM(s) Oral two times a day  rivaroxaban 15 milliGRAM(s) Oral with dinner    cephalexin 500 milliGRAM(s) Oral three times a day    benzonatate 100 milliGRAM(s) Oral three times a day  guaiFENesin   Syrup  (Sugar-Free) 100 milliGRAM(s) Oral every 6 hours PRN    acetaminophen   Tablet .. 650 milliGRAM(s) Oral every 6 hours PRN  acetaminophen   Tablet .. 650 milliGRAM(s) Oral every 6 hours PRN  traMADol 25 milliGRAM(s) Oral every 6 hours PRN      atorvastatin 80 milliGRAM(s) Oral at bedtime  dextrose 40% Gel 15 Gram(s) Oral once PRN  dextrose 50% Injectable 12.5 Gram(s) IV Push once  dextrose 50% Injectable 25 Gram(s) IV Push once  dextrose 50% Injectable 25 Gram(s) IV Push once  glucagon  Injectable 1 milliGRAM(s) IntraMuscular once PRN  insulin glargine Injectable (LANTUS) 36 Unit(s) SubCutaneous at bedtime  insulin lispro (HumaLOG) corrective regimen sliding scale   SubCutaneous three times a day before meals  insulin lispro (HumaLOG) corrective regimen sliding scale   SubCutaneous at bedtime  insulin lispro Injectable (HumaLOG) 9 Unit(s) SubCutaneous three times a day before meals    benzocaine 15 mG/menthol 3.6 mG (Sugar-Free) Lozenge 1 Lozenge Oral four times a day PRN  dextrose 5%. 1000 milliLiter(s) IV Continuous <Continuous>  sodium chloride 0.9%. 1000 milliLiter(s) IV Continuous <Continuous>      PAST MEDICAL & SURGICAL HISTORY:  Afib  Essential hypertension, hypertension with unspecified goal  Hyperlipidemia, unspecified hyperlipidemia type  Type 2 diabetes mellitus  CAD in native artery  Former smoker  s/p Carotid Endarterectomy: left      FAMILY HISTORY:  Family history of heart disease: father  age 71      SOCIAL HISTORY:    Former tobacco use  	  REVIEW OF SYSTEMS:  See HPI. Otherwise, RLE cellulitis improving.  R leg hematoma    PHYSICAL EXAM:  T(C): 37.3 (19 @ 04:30), Max: 37.4 (19 @ 14:40)  HR: 120 (19 @ 04:30) (97 - 120)  BP: 144/76 (19 @ 04:30) (134/61 - 170/81)  RR: 18 (19 @ 04:30) (18 - 18)  SpO2: 96% (19 @ 04:30) (95% - 100%)  Wt(kg): --  I&O's Summary    01 Sep 2019 07:01  -  02 Sep 2019 07:00  --------------------------------------------------------  IN: 1740 mL / OUT: 0 mL / NET: 1740 mL        Appearance: Normal	  HEENT:   Normal oral mucosa, PERRL, EOMI	  Lymphatic: No lymphadenopathy  Cardiovascular: irregularly irregular S1 S2, No JVD, No murmurs, No edema  Respiratory: Lungs clear to auscultation	  Psychiatry: A & O x 3, Mood & affect appropriate  Gastrointestinal:  Soft, Non-tender, + BS	  Skin: No rashes, ecchymosis of left hamstring area. 	  Neurologic: Non-focal  Extremities: Normal range of motion, No clubbing, cyanosis or edema  Vascular: Peripheral pulses palpable 2+ bilaterally        LABS:	 	    CBC Full  -  ( 02 Sep 2019 10:08 )  WBC Count : 20.0 K/uL  Hemoglobin : 13.7 g/dL  Hematocrit : 41.6 %  Platelet Count - Automated : 429 K/uL  Mean Cell Volume : 91.5 fl  Mean Cell Hemoglobin : 30.1 pg  Mean Cell Hemoglobin Concentration : 32.9 gm/dL  Auto Neutrophil # : x  Auto Lymphocyte # : x  Auto Monocyte # : x  Auto Eosinophil # : x  Auto Basophil # : x  Auto Neutrophil % : x  Auto Lymphocyte % : x  Auto Monocyte % : x  Auto Eosinophil % : x  Auto Basophil % : x        133<L>  |  98  |  23  ----------------------------<  227<H>  4.2   |  20<L>  |  1.33<H>    Ca    9.3      02 Sep 2019 00:18  Phos  2.6       Mg     1.9           Creatine Kinase, Serum: 99 U/L (19 @ 15:12)      TELEMETRY: 	    ECG (2019): AF with RVR.	    PREVIOUS DIAGNOSTIC TESTING:    Echocardiogram (2018):  Normal LV systolic function. Mild concentric LVH.  Mild LAE.  Mild to moderate MR.   Catheterization (2016): EF= 70%.  Normal LM.  90% Proximal LAD.  40% mid LAD.  50% D1.  Normal LCX and RCA.   	  ASSESSMENT/PLAN: 	64 year old man with persistent atrial fibrillation in the setting of hypertension, diabetes mellitus, and coronary artery disease.  Given his elevated [id number] (3) I agree with oral AC.  He is tolerating the Xarelto and we will continue this.  His rate control is not adequate and we should increase metoprolol as tolerated, perhaps at the "expense" of Norvasc if necessary.  I also suggested that we proceed with a cardioversion.  Should he prove to feel better in sinus we may consider for a more aggressive attempt at rhythm control.  Given his comorbidities, ie CAD, renal insufficiency, this may be best done with an ablative strategy.

## 2019-09-02 NOTE — PROGRESS NOTE ADULT - ASSESSMENT
64 year old male with a history of CAD S/P multiple stents, chronic AF  presents with fever, erythema swelling and pain of the right foot.  He also has HTN, DM, peripheral neuropathy.  The degree of erythema and swelling is much improved.   In the office it was quite red over the medial malleolus. Left thigh erythema present ? fascial plane hemorrhage from severe cramp. Given h/o spontaneous hematomas - this one being smaller and CHADS VASC 3  - would retain AC with Xarelto.   - cont ABX  - cont Xarelto for thromboembolism prophylaxis. has persistent af  - Was on Brillinta - change to ASA to reduce overall antiplatelet anticoagulant burden. Had stents > 1 yr ago   - cont metoprolol  - Appreciate consultation from Dr Barnett - EPS - agree with eventual YVES DCCV +/- AAD with potential future RFA ablation

## 2019-09-02 NOTE — PROGRESS NOTE ADULT - SUBJECTIVE AND OBJECTIVE BOX
SUBJECTIVE:  NO CP or SOB  	  MEDICATIONS:  amLODIPine   Tablet 5 milliGRAM(s) Oral daily  metoprolol succinate ER 25 milliGRAM(s) Oral two times a day    cephalexin 500 milliGRAM(s) Oral three times a day    benzonatate 100 milliGRAM(s) Oral three times a day  guaiFENesin   Syrup  (Sugar-Free) 100 milliGRAM(s) Oral every 6 hours PRN    acetaminophen   Tablet .. 650 milliGRAM(s) Oral every 6 hours PRN  acetaminophen   Tablet .. 650 milliGRAM(s) Oral every 6 hours PRN  traMADol 25 milliGRAM(s) Oral every 6 hours PRN      atorvastatin 80 milliGRAM(s) Oral at bedtime  dextrose 40% Gel 15 Gram(s) Oral once PRN  dextrose 50% Injectable 12.5 Gram(s) IV Push once  dextrose 50% Injectable 25 Gram(s) IV Push once  dextrose 50% Injectable 25 Gram(s) IV Push once  glucagon  Injectable 1 milliGRAM(s) IntraMuscular once PRN  insulin glargine Injectable (LANTUS) 36 Unit(s) SubCutaneous at bedtime  insulin lispro (HumaLOG) corrective regimen sliding scale   SubCutaneous three times a day before meals  insulin lispro (HumaLOG) corrective regimen sliding scale   SubCutaneous at bedtime  insulin lispro Injectable (HumaLOG) 9 Unit(s) SubCutaneous three times a day before meals    aspirin enteric coated 81 milliGRAM(s) Oral daily  benzocaine 15 mG/menthol 3.6 mG (Sugar-Free) Lozenge 1 Lozenge Oral four times a day PRN  dextrose 5%. 1000 milliLiter(s) IV Continuous <Continuous>  rivaroxaban 15 milliGRAM(s) Oral with dinner  sodium chloride 0.9%. 1000 milliLiter(s) IV Continuous <Continuous>      REVIEW OF SYSTEMS:    CONSTITUTIONAL: No fever, weight loss, or fatigue  EYES: No eye pain, visual disturbances, or discharge  NECK: No pain or stiffness  RESPIRATORY: No cough, wheezing, chills or hemoptysis; No Shortness of Breath  CARDIOVASCULAR: No chest pain, palpitations, dizziness, or leg swelling  GASTROINTESTINAL: No abdominal or epigastric pain. No nausea, vomiting, or hematemesis; No diarrhea or constipation. No melena or hematochezia.  GENITOURINARY: No dysuria, frequency, hematuria, or incontinence  NEUROLOGICAL: No headaches, memory loss, loss of strength, numbness, or tremors  SKIN: No itching, burning, rashes, or lesions   LYMPH Nodes: No enlarged glands  MUSCULOSKELETAL: No joint pain or swelling; No muscle, back, or extremity pain  All other review of systems are negative.  	  [ ] Unable to obtain    PHYSICAL EXAM:  T(C): 37.3 (09-02-19 @ 13:55), Max: 37.4 (09-01-19 @ 21:01)  HR: 113 (09-02-19 @ 13:55) (97 - 120)  BP: 111/73 (09-02-19 @ 13:55) (111/73 - 170/81)  RR: 20 (09-02-19 @ 13:55) (18 - 20)  SpO2: 98% (09-02-19 @ 13:55) (95% - 98%)  Wt(kg): --  I&O's Summary    01 Sep 2019 07:01  -  02 Sep 2019 07:00  --------------------------------------------------------  IN: 1740 mL / OUT: 0 mL / NET: 1740 mL    02 Sep 2019 07:01  -  02 Sep 2019 14:44  --------------------------------------------------------  IN: 480 mL / OUT: 0 mL / NET: 480 mL          PHYSICAL EXAM    Appearance: Normal	  HEENT:   Normal oral mucosa, PERRL, EOMI	  NECK: Soft and supple, No LAD, No JVD  Cardiovascular: Regular Rate and Rhythm, Normal S1 S2, No murmurs, No clicks, gallops or rubs  Respiratory: Lungs clear to auscultation	  Gastrointestinal:  Soft, Non-tender, + BS	  Skin: No rashes, No ecchymoses, No cyanosis  Neurologic: Non-focal  Extremities: No clubbing, cyanosis or edema  Vascular: Peripheral pulses palpable 2+ bilaterally      LABS:	 	                            13.7   20.0  )-----------( 429      ( 02 Sep 2019 10:08 )             41.6     09-02    137  |  98  |  19  ----------------------------<  261<H>  4.6   |  26  |  1.19    Ca    10.2      02 Sep 2019 10:08  Phos  2.6     09-01  Mg     1.9     09-02

## 2019-09-02 NOTE — PROGRESS NOTE ADULT - ASSESSMENT
64M with PMHx of CAD (post-three stents), HTN, HLD, T2DM (complicated by peripheral neuropathy), afib on AC presenting with Le cellulitis /fever "not responding to abx " as o/p.  Pt was discharged last week  and since then noted some swelling in RLE and occasional pain... three days ago was seen in ED and was found to have cellulitis of RLE and sent on keflex... yesterday pt had a follow up with cardio and was found to have fever and was told to come to ER however he only reported to Ed today..  still c./o pain in foot though seems to be less..  erythema with some improvement   no N/V/d   no truama   and no hx of gout   in ED was found to have FS>600   met sepsis criteria   BARRY  w CR of 1.7   hyponatremia of sodium 130    1- sepsis sec to cellulitis :  blood culture  neg  cont abx   f/u with ID   doubt DVT  .. US done two days ago neg ...pt on AC   will repeat US given swelling though improved     2- hyponatremia : marjan pseudohyponatermia  improved     3- BARRY : sec to sepsis   hold lasix / ACE for now   still with Elevated Cr   f/u  US and bladder scan   dw renal improving Cr marjan sec to dehydration       4- Afib : rate control   cont AC     5- HTN cont bp meds and monitor     6- DM: uncontrolled  A1c 8.7  FS >600   now improved with humalog   f/u with endo     7- cough :   unclear if residual from ifection however seems to coincide with starting aCE   will hold and re-evaluate as o/p. 64M with PMHx of CAD (post-three stents), HTN, HLD, T2DM (complicated by peripheral neuropathy), afib on AC presenting with Le cellulitis /fever "not responding to abx " as o/p.  Pt was discharged last week  and since then noted some swelling in RLE and occasional pain... three days ago was seen in ED and was found to have cellulitis of RLE and sent on keflex... yesterday pt had a follow up with cardio and was found to have fever and was told to come to ER however he only reported to Ed today..  still c./o pain in foot though seems to be less..  erythema with some improvement   no N/V/d   no truama   and no hx of gout   in ED was found to have FS>600   met sepsis criteria   BARRY  w CR of 1.7   hyponatremia of sodium 130    1- sepsis sec to cellulitis :  blood culture  neg  cont abx   f/u with ID   doubt DVT  .. US done two days ago neg ...pt on AC   will repeat US given swelling though improved   clinically improved however with leukocytosis ??  monitor for now   f/u with ID     2- hyponatremia : marjan pseudohyponatermia  improved     3- BARRY : sec to sepsis   hold lasix / ACE for now   still with Elevated Cr   f/u  US and bladder scan   dw renal improving Cr marjan sec to dehydration     4- Afib with RVR : discussed with EP : plan for DCCV   cont AC   cont rate contorl     5- HTN cont bp meds and monitor     6- DM: uncontrolled  A1c 8.7  FS >600   now improved with humalog   f/u with endo     7- cough :   unclear if residual from ifection however seems to coincide with starting aCE   will hold and re-evaluate as o/p.     8- L thigh pain : f/u US r/o Hematoma   monitor H/H

## 2019-09-02 NOTE — PROGRESS NOTE ADULT - PROBLEM SELECTOR PLAN 1
Will increase Lantus to 32 units at bed time.  Will increase Humalog to 6 units before each meal in addition to Humalog correction scale coverage.  Patient counseled for compliance with consistent low carb diet.

## 2019-09-02 NOTE — PROGRESS NOTE ADULT - ASSESSMENT
Assessment /PLAN  DMT2: 64y Male with DM T2 with hyperglycemia, A1C 9% ,  blood sugars trending down, had hypoglycemic episode, eating partial meals,   Cellulitis Left leg: On antibiotics, stable. Has pain and edema legs, being worked up,  ID FU   CAD: on medications, no chest pain, stable, monitored.  HTN: Controlled,  on antihypertensive medications.

## 2019-09-02 NOTE — PROGRESS NOTE ADULT - SUBJECTIVE AND OBJECTIVE BOX
Patient is a 64y old  Male who presents with a chief complaint of Cellulitis (02 Sep 2019 14:44)                                                               INTERVAL HPI/OVERNIGHT EVENTS:    REVIEW OF SYSTEMS:     CONSTITUTIONAL: No weakness, fevers or chills  EYES/ENT: No visual changes , no ear ache   NECK: No pain or stiffness  RESPIRATORY: No cough, wheezing,  No shortness of breath  CARDIOVASCULAR: No chest pain or palpitations  GASTROINTESTINAL: No abdominal pain  . No nausea, vomiting, or hematemesis; No diarrhea or constipation. No melena or hematochezia.  GENITOURINARY: No dysuria, frequency or hematuria  NEUROLOGICAL: No numbness or weakness  SKIN: No itching, burning, rashes, or lesions                                                                                                                                                                                                                                                                                 Medications:  MEDICATIONS  (STANDING):  amLODIPine   Tablet 5 milliGRAM(s) Oral daily  aspirin enteric coated 81 milliGRAM(s) Oral daily  atorvastatin 80 milliGRAM(s) Oral at bedtime  benzonatate 100 milliGRAM(s) Oral three times a day  cephalexin 500 milliGRAM(s) Oral three times a day  dextrose 5%. 1000 milliLiter(s) (50 mL/Hr) IV Continuous <Continuous>  dextrose 50% Injectable 12.5 Gram(s) IV Push once  dextrose 50% Injectable 25 Gram(s) IV Push once  dextrose 50% Injectable 25 Gram(s) IV Push once  insulin glargine Injectable (LANTUS) 32 Unit(s) SubCutaneous at bedtime  insulin lispro (HumaLOG) corrective regimen sliding scale   SubCutaneous three times a day before meals  insulin lispro (HumaLOG) corrective regimen sliding scale   SubCutaneous at bedtime  insulin lispro Injectable (HumaLOG) 6 Unit(s) SubCutaneous three times a day before meals  metoprolol succinate ER 25 milliGRAM(s) Oral two times a day  rivaroxaban 15 milliGRAM(s) Oral with dinner  sodium chloride 0.9%. 1000 milliLiter(s) (50 mL/Hr) IV Continuous <Continuous>    MEDICATIONS  (PRN):  acetaminophen   Tablet .. 650 milliGRAM(s) Oral every 6 hours PRN Temp greater or equal to 38C (100.4F)  acetaminophen   Tablet .. 650 milliGRAM(s) Oral every 6 hours PRN Mild Pain (1 - 3)  benzocaine 15 mG/menthol 3.6 mG (Sugar-Free) Lozenge 1 Lozenge Oral four times a day PRN Sore Throat  dextrose 40% Gel 15 Gram(s) Oral once PRN Blood Glucose LESS THAN 70 milliGRAM(s)/deciliter  glucagon  Injectable 1 milliGRAM(s) IntraMuscular once PRN Glucose LESS THAN 70 milligrams/deciliter  guaiFENesin   Syrup  (Sugar-Free) 100 milliGRAM(s) Oral every 6 hours PRN Cough  traMADol 25 milliGRAM(s) Oral every 6 hours PRN Moderate Pain (4 - 6)       Allergies    No Known Allergies    Intolerances      Vital Signs Last 24 Hrs  T(C): 37.3 (02 Sep 2019 21:08), Max: 37.3 (02 Sep 2019 04:30)  T(F): 99.1 (02 Sep 2019 21:08), Max: 99.2 (02 Sep 2019 13:55)  HR: 123 (02 Sep 2019 21:08) (108 - 123)  BP: 131/63 (02 Sep 2019 21:08) (111/73 - 160/77)  BP(mean): --  RR: 18 (02 Sep 2019 21:08) (18 - 20)  SpO2: 97% (02 Sep 2019 21:08) (95% - 98%)  CAPILLARY BLOOD GLUCOSE      POCT Blood Glucose.: 256 mg/dL (02 Sep 2019 21:37)  POCT Blood Glucose.: 203 mg/dL (02 Sep 2019 19:00)  POCT Blood Glucose.: 64 mg/dL (02 Sep 2019 18:21)  POCT Blood Glucose.: 79 mg/dL (02 Sep 2019 17:57)  POCT Blood Glucose.: 66 mg/dL (02 Sep 2019 17:56)  POCT Blood Glucose.: 175 mg/dL (02 Sep 2019 12:03)  POCT Blood Glucose.: 168 mg/dL (02 Sep 2019 08:35)      09-01 @ 07:01  -  09-02 @ 07:00  --------------------------------------------------------  IN: 1740 mL / OUT: 0 mL / NET: 1740 mL    09-02 @ 07:01  -  09-02 @ 22:19  --------------------------------------------------------  IN: 820 mL / OUT: 0 mL / NET: 820 mL      Physical Exam:    Daily     Daily   General:  Well appearing, NAD, not cachetic  HEENT:  Nonicteric, PERRLA  CV:  RRR, S1S2   Lungs:  CTA B/L, no wheezes, rales, rhonchi  Abdomen:  Soft, non-tender, no distended, positive BS  Extremities:  2+ pulses, no c/c, no edema  Skin:  Warm and dry, no rashes  :  No moreno  Neuro:  AAOx3, non-focal, grossly intact                                                                                                                                                                                                                                                                                                LABS:                               13.7   20.0  )-----------( 429      ( 02 Sep 2019 10:08 )             41.6                      09-02    137  |  98  |  19  ----------------------------<  261<H>  4.6   |  26  |  1.19    Ca    10.2      02 Sep 2019 10:08  Phos  2.6     09-01  Mg     1.9     09-02                         RADIOLOGY & ADDITIONAL TESTS         I personally reviewed: [  ]EKG   [  ]CXR    [  ] CT      A/P:         Discussed with :     Derek consultants' Notes   Time spent : Patient is a 64y old  Male who presents with a chief complaint of Cellulitis (02 Sep 2019 14:44)                                                               INTERVAL HPI/OVERNIGHT EVENTS:    REVIEW OF SYSTEMS:     CONSTITUTIONAL: No weakness, fevers or chills  RESPIRATORY: No cough, wheezing,  No shortness of breath  CARDIOVASCULAR: No chest pain or palpitations  GASTROINTESTINAL: No abdominal pain  . No nausea, vomiting, or hematemesis; No diarrhea or constipation. No melena or hematochezia.  GENITOURINARY: No dysuria, frequency or hematuria  NEUROLOGICAL: No numbness or weakness                                                                                                                                                                                                                                                                                Medications:  MEDICATIONS  (STANDING):  amLODIPine   Tablet 5 milliGRAM(s) Oral daily  aspirin enteric coated 81 milliGRAM(s) Oral daily  atorvastatin 80 milliGRAM(s) Oral at bedtime  benzonatate 100 milliGRAM(s) Oral three times a day  cephalexin 500 milliGRAM(s) Oral three times a day  dextrose 5%. 1000 milliLiter(s) (50 mL/Hr) IV Continuous <Continuous>  dextrose 50% Injectable 12.5 Gram(s) IV Push once  dextrose 50% Injectable 25 Gram(s) IV Push once  dextrose 50% Injectable 25 Gram(s) IV Push once  insulin glargine Injectable (LANTUS) 32 Unit(s) SubCutaneous at bedtime  insulin lispro (HumaLOG) corrective regimen sliding scale   SubCutaneous three times a day before meals  insulin lispro (HumaLOG) corrective regimen sliding scale   SubCutaneous at bedtime  insulin lispro Injectable (HumaLOG) 6 Unit(s) SubCutaneous three times a day before meals  metoprolol succinate ER 25 milliGRAM(s) Oral two times a day  rivaroxaban 15 milliGRAM(s) Oral with dinner  sodium chloride 0.9%. 1000 milliLiter(s) (50 mL/Hr) IV Continuous <Continuous>    MEDICATIONS  (PRN):  acetaminophen   Tablet .. 650 milliGRAM(s) Oral every 6 hours PRN Temp greater or equal to 38C (100.4F)  acetaminophen   Tablet .. 650 milliGRAM(s) Oral every 6 hours PRN Mild Pain (1 - 3)  benzocaine 15 mG/menthol 3.6 mG (Sugar-Free) Lozenge 1 Lozenge Oral four times a day PRN Sore Throat  dextrose 40% Gel 15 Gram(s) Oral once PRN Blood Glucose LESS THAN 70 milliGRAM(s)/deciliter  glucagon  Injectable 1 milliGRAM(s) IntraMuscular once PRN Glucose LESS THAN 70 milligrams/deciliter  guaiFENesin   Syrup  (Sugar-Free) 100 milliGRAM(s) Oral every 6 hours PRN Cough  traMADol 25 milliGRAM(s) Oral every 6 hours PRN Moderate Pain (4 - 6)       Allergies    No Known Allergies    Intolerances      Vital Signs Last 24 Hrs  T(C): 37.3 (02 Sep 2019 21:08), Max: 37.3 (02 Sep 2019 04:30)  T(F): 99.1 (02 Sep 2019 21:08), Max: 99.2 (02 Sep 2019 13:55)  HR: 123 (02 Sep 2019 21:08) (108 - 123)  BP: 131/63 (02 Sep 2019 21:08) (111/73 - 160/77)  BP(mean): --  RR: 18 (02 Sep 2019 21:08) (18 - 20)  SpO2: 97% (02 Sep 2019 21:08) (95% - 98%)  CAPILLARY BLOOD GLUCOSE      POCT Blood Glucose.: 256 mg/dL (02 Sep 2019 21:37)  POCT Blood Glucose.: 203 mg/dL (02 Sep 2019 19:00)  POCT Blood Glucose.: 64 mg/dL (02 Sep 2019 18:21)  POCT Blood Glucose.: 79 mg/dL (02 Sep 2019 17:57)  POCT Blood Glucose.: 66 mg/dL (02 Sep 2019 17:56)  POCT Blood Glucose.: 175 mg/dL (02 Sep 2019 12:03)  POCT Blood Glucose.: 168 mg/dL (02 Sep 2019 08:35)      09-01 @ 07:01  -  09-02 @ 07:00  --------------------------------------------------------  IN: 1740 mL / OUT: 0 mL / NET: 1740 mL    09-02 @ 07:01  -  09-02 @ 22:19  --------------------------------------------------------  IN: 820 mL / OUT: 0 mL / NET: 820 mL      Physical Exam:    General:  Well appearing, NAD, not cachetic  HEENT:  Nonicteric, PERRLA  CV:  RRR, S1S2   Lungs:  CTA B/L, no wheezes, rales, rhonchi  Abdomen:  Soft, non-tender, no distended, positive BS  Extremities:  trace edema on R   Neuro:  AAOx3, non-focal, grossly intact    ecchymosis of L thigh    RUE swelling with good pulses and no signs of compartment syndrome                                                                                                                                                                                                                                                                                               LABS:                               13.7   20.0  )-----------( 429      ( 02 Sep 2019 10:08 )             41.6                      09-02    137  |  98  |  19  ----------------------------<  261<H>  4.6   |  26  |  1.19    Ca    10.2      02 Sep 2019 10:08  Phos  2.6     09-01  Mg     1.9     09-02 Patient is a 64y old  Male who presents with a chief complaint of Cellulitis (02 Sep 2019 14:44)                                                               INTERVAL HPI/OVERNIGHT EVENTS:    REVIEW OF SYSTEMS:     CONSTITUTIONAL: No weakness, fevers or chills  RESPIRATORY: No cough, wheezing,  No shortness of breath  CARDIOVASCULAR: No chest pain or palpitations  GASTROINTESTINAL: No abdominal pain  . No nausea, vomiting, or hematemesis; No diarrhea or constipation. No melena or hematochezia.  GENITOURINARY: No dysuria, frequency or hematuria  NEUROLOGICAL: No numbness or weakness          MSK " still with pain L thigh                                                                                                                                                                                                                                                                       Medications:  MEDICATIONS  (STANDING):  amLODIPine   Tablet 5 milliGRAM(s) Oral daily  aspirin enteric coated 81 milliGRAM(s) Oral daily  atorvastatin 80 milliGRAM(s) Oral at bedtime  benzonatate 100 milliGRAM(s) Oral three times a day  cephalexin 500 milliGRAM(s) Oral three times a day  dextrose 5%. 1000 milliLiter(s) (50 mL/Hr) IV Continuous <Continuous>  dextrose 50% Injectable 12.5 Gram(s) IV Push once  dextrose 50% Injectable 25 Gram(s) IV Push once  dextrose 50% Injectable 25 Gram(s) IV Push once  insulin glargine Injectable (LANTUS) 32 Unit(s) SubCutaneous at bedtime  insulin lispro (HumaLOG) corrective regimen sliding scale   SubCutaneous three times a day before meals  insulin lispro (HumaLOG) corrective regimen sliding scale   SubCutaneous at bedtime  insulin lispro Injectable (HumaLOG) 6 Unit(s) SubCutaneous three times a day before meals  metoprolol succinate ER 25 milliGRAM(s) Oral two times a day  rivaroxaban 15 milliGRAM(s) Oral with dinner  sodium chloride 0.9%. 1000 milliLiter(s) (50 mL/Hr) IV Continuous <Continuous>    MEDICATIONS  (PRN):  acetaminophen   Tablet .. 650 milliGRAM(s) Oral every 6 hours PRN Temp greater or equal to 38C (100.4F)  acetaminophen   Tablet .. 650 milliGRAM(s) Oral every 6 hours PRN Mild Pain (1 - 3)  benzocaine 15 mG/menthol 3.6 mG (Sugar-Free) Lozenge 1 Lozenge Oral four times a day PRN Sore Throat  dextrose 40% Gel 15 Gram(s) Oral once PRN Blood Glucose LESS THAN 70 milliGRAM(s)/deciliter  glucagon  Injectable 1 milliGRAM(s) IntraMuscular once PRN Glucose LESS THAN 70 milligrams/deciliter  guaiFENesin   Syrup  (Sugar-Free) 100 milliGRAM(s) Oral every 6 hours PRN Cough  traMADol 25 milliGRAM(s) Oral every 6 hours PRN Moderate Pain (4 - 6)       Allergies    No Known Allergies    Intolerances      Vital Signs Last 24 Hrs  T(C): 37.3 (02 Sep 2019 21:08), Max: 37.3 (02 Sep 2019 04:30)  T(F): 99.1 (02 Sep 2019 21:08), Max: 99.2 (02 Sep 2019 13:55)  HR: 123 (02 Sep 2019 21:08) (108 - 123)  BP: 131/63 (02 Sep 2019 21:08) (111/73 - 160/77)  BP(mean): --  RR: 18 (02 Sep 2019 21:08) (18 - 20)  SpO2: 97% (02 Sep 2019 21:08) (95% - 98%)  CAPILLARY BLOOD GLUCOSE      POCT Blood Glucose.: 256 mg/dL (02 Sep 2019 21:37)  POCT Blood Glucose.: 203 mg/dL (02 Sep 2019 19:00)  POCT Blood Glucose.: 64 mg/dL (02 Sep 2019 18:21)  POCT Blood Glucose.: 79 mg/dL (02 Sep 2019 17:57)  POCT Blood Glucose.: 66 mg/dL (02 Sep 2019 17:56)  POCT Blood Glucose.: 175 mg/dL (02 Sep 2019 12:03)  POCT Blood Glucose.: 168 mg/dL (02 Sep 2019 08:35)      09-01 @ 07:01 - 09-02 @ 07:00  --------------------------------------------------------  IN: 1740 mL / OUT: 0 mL / NET: 1740 mL    09-02 @ 07:01  -  09-02 @ 22:19  --------------------------------------------------------  IN: 820 mL / OUT: 0 mL / NET: 820 mL      Physical Exam:    General:  Well appearing, NAD, not cachetic  HEENT:  Nonicteric, PERRLA  CV:  RRR, S1S2   Lungs:  CTA B/L, no wheezes, rales, rhonchi  Abdomen:  Soft, non-tender, no distended, positive BS  Extremities:  trace edema on R   Neuro:  AAOx3, non-focal, grossly intact    ecchymosis of L thigh    RUE swelling with good pulses and no signs of compartment syndrome                                                                                                                                                                                                                                                                                               LABS:                               13.7   20.0  )-----------( 429      ( 02 Sep 2019 10:08 )             41.6                      09-02    137  |  98  |  19  ----------------------------<  261<H>  4.6   |  26  |  1.19    Ca    10.2      02 Sep 2019 10:08  Phos  2.6     09-01  Mg     1.9     09-02

## 2019-09-03 ENCOUNTER — APPOINTMENT (OUTPATIENT)
Dept: ELECTROPHYSIOLOGY | Facility: CLINIC | Age: 64
End: 2019-09-03

## 2019-09-03 LAB
GLUCOSE BLDC GLUCOMTR-MCNC: 104 MG/DL — HIGH (ref 70–99)
GLUCOSE BLDC GLUCOMTR-MCNC: 123 MG/DL — HIGH (ref 70–99)
GLUCOSE BLDC GLUCOMTR-MCNC: 265 MG/DL — HIGH (ref 70–99)
GLUCOSE BLDC GLUCOMTR-MCNC: 270 MG/DL — HIGH (ref 70–99)
GLUCOSE BLDC GLUCOMTR-MCNC: 343 MG/DL — HIGH (ref 70–99)
PROCALCITONIN SERPL-MCNC: 0.06 NG/ML — SIGNIFICANT CHANGE UP (ref 0.02–0.1)

## 2019-09-03 PROCEDURE — 99232 SBSQ HOSP IP/OBS MODERATE 35: CPT

## 2019-09-03 RX ORDER — METOPROLOL TARTRATE 50 MG
25 TABLET ORAL ONCE
Refills: 0 | Status: COMPLETED | OUTPATIENT
Start: 2019-09-03 | End: 2019-09-03

## 2019-09-03 RX ORDER — METOPROLOL TARTRATE 50 MG
50 TABLET ORAL
Refills: 0 | Status: DISCONTINUED | OUTPATIENT
Start: 2019-09-04 | End: 2019-09-09

## 2019-09-03 RX ORDER — CEPHALEXIN 500 MG
500 CAPSULE ORAL THREE TIMES A DAY
Refills: 0 | Status: DISCONTINUED | OUTPATIENT
Start: 2019-09-03 | End: 2019-09-04

## 2019-09-03 RX ORDER — OXYCODONE AND ACETAMINOPHEN 5; 325 MG/1; MG/1
2 TABLET ORAL EVERY 6 HOURS
Refills: 0 | Status: DISCONTINUED | OUTPATIENT
Start: 2019-09-03 | End: 2019-09-10

## 2019-09-03 RX ORDER — DILTIAZEM HCL 120 MG
10 CAPSULE, EXT RELEASE 24 HR ORAL
Qty: 125 | Refills: 0 | Status: DISCONTINUED | OUTPATIENT
Start: 2019-09-03 | End: 2019-09-03

## 2019-09-03 RX ORDER — DILTIAZEM HCL 120 MG
5 CAPSULE, EXT RELEASE 24 HR ORAL
Qty: 125 | Refills: 0 | Status: DISCONTINUED | OUTPATIENT
Start: 2019-09-03 | End: 2019-09-04

## 2019-09-03 RX ORDER — INSULIN GLARGINE 100 [IU]/ML
25 INJECTION, SOLUTION SUBCUTANEOUS ONCE
Refills: 0 | Status: COMPLETED | OUTPATIENT
Start: 2019-09-03 | End: 2019-09-04

## 2019-09-03 RX ADMIN — Medication 500 MILLIGRAM(S): at 21:19

## 2019-09-03 RX ADMIN — ATORVASTATIN CALCIUM 80 MILLIGRAM(S): 80 TABLET, FILM COATED ORAL at 21:19

## 2019-09-03 RX ADMIN — Medication 650 MILLIGRAM(S): at 05:04

## 2019-09-03 RX ADMIN — Medication 10 MG/HR: at 22:33

## 2019-09-03 RX ADMIN — Medication 6 UNIT(S): at 18:30

## 2019-09-03 RX ADMIN — TRAMADOL HYDROCHLORIDE 25 MILLIGRAM(S): 50 TABLET ORAL at 17:12

## 2019-09-03 RX ADMIN — Medication 100 MILLIGRAM(S): at 05:38

## 2019-09-03 RX ADMIN — Medication 500 MILLIGRAM(S): at 12:17

## 2019-09-03 RX ADMIN — Medication 25 MILLIGRAM(S): at 05:39

## 2019-09-03 RX ADMIN — Medication 25 MILLIGRAM(S): at 12:44

## 2019-09-03 RX ADMIN — OXYCODONE AND ACETAMINOPHEN 2 TABLET(S): 5; 325 TABLET ORAL at 21:17

## 2019-09-03 RX ADMIN — Medication 100 MILLIGRAM(S): at 21:19

## 2019-09-03 RX ADMIN — Medication 650 MILLIGRAM(S): at 04:04

## 2019-09-03 RX ADMIN — AMLODIPINE BESYLATE 5 MILLIGRAM(S): 2.5 TABLET ORAL at 05:39

## 2019-09-03 RX ADMIN — Medication 8: at 18:30

## 2019-09-03 RX ADMIN — TRAMADOL HYDROCHLORIDE 25 MILLIGRAM(S): 50 TABLET ORAL at 17:42

## 2019-09-03 RX ADMIN — Medication 500 MILLIGRAM(S): at 05:39

## 2019-09-03 RX ADMIN — RIVAROXABAN 15 MILLIGRAM(S): KIT at 17:19

## 2019-09-03 RX ADMIN — Medication 100 MILLIGRAM(S): at 12:16

## 2019-09-03 RX ADMIN — OXYCODONE AND ACETAMINOPHEN 2 TABLET(S): 5; 325 TABLET ORAL at 22:30

## 2019-09-03 RX ADMIN — Medication 81 MILLIGRAM(S): at 12:16

## 2019-09-03 NOTE — PROGRESS NOTE ADULT - SUBJECTIVE AND OBJECTIVE BOX
CC: f/u for RLE cellulitis    Patient reports: he is c/o left anterior thigh pain and is awaiting ablation for rapid A Fib    REVIEW OF SYSTEMS:  All other review of systems negative (Comprehensive ROS)    Antimicrobials Day #  :day 6/  cephalexin 500 milliGRAM(s) Oral three times a day    Other Medications Reviewed  MEDICATIONS  (STANDING):  amLODIPine   Tablet 5 milliGRAM(s) Oral daily  aspirin enteric coated 81 milliGRAM(s) Oral daily  atorvastatin 80 milliGRAM(s) Oral at bedtime  benzonatate 100 milliGRAM(s) Oral three times a day  cephalexin 500 milliGRAM(s) Oral three times a day  dextrose 5%. 1000 milliLiter(s) (50 mL/Hr) IV Continuous <Continuous>  dextrose 50% Injectable 12.5 Gram(s) IV Push once  dextrose 50% Injectable 25 Gram(s) IV Push once  dextrose 50% Injectable 25 Gram(s) IV Push once  insulin glargine Injectable (LANTUS) 32 Unit(s) SubCutaneous at bedtime  insulin lispro (HumaLOG) corrective regimen sliding scale   SubCutaneous three times a day before meals  insulin lispro (HumaLOG) corrective regimen sliding scale   SubCutaneous at bedtime  insulin lispro Injectable (HumaLOG) 6 Unit(s) SubCutaneous three times a day before meals  metoprolol succinate ER 25 milliGRAM(s) Oral once  rivaroxaban 15 milliGRAM(s) Oral with dinner    T(F): 99.4 (19 @ 04:59), Max: 99.4 (19 @ 04:59)  HR: 116 (19 @ 04:59)  BP: 130/67 (19 @ 04:59)  RR: 18 (19 @ 04:59)  SpO2: 96% (19 @ 04:59)  Wt(kg): --    PHYSICAL EXAM:  General: alert, no acute distress  Eyes:  anicteric, no conjunctival injection, no discharge  Oropharynx: no lesions or injection 	  Neck: supple, without adenopathy  Lungs: clear to auscultation  Heart: irregular rate and rhythm; no murmur, rubs or gallops  Abdomen: soft, nondistended, nontender, without mass or organomegaly  Skin: no lesions  Extremities: no clubbing, cyanosis, or edema  Neurologic: alert, oriented, moves all extremities  Left thigh with hematoma medially  LAB RESULTS:                        13.7   20.0  )-----------( 429      ( 02 Sep 2019 10:08 )             41.6         137  |  98  |  19  ----------------------------<  261<H>  4.6   |  26  |  1.19    Ca    10.2      02 Sep 2019 10:08  Phos  2.6       Mg     1.9             Urinalysis Basic - ( 01 Sep 2019 19:58 )    Color: Yellow / Appearance: Clear / S.025 / pH: x  Gluc: x / Ketone: Trace  / Bili: Negative / Urobili: <2 mg/dL   Blood: x / Protein: Trace / Nitrite: Negative   Leuk Esterase: Negative / RBC: x / WBC x   Sq Epi: x / Non Sq Epi: x / Bacteria: x      MICROBIOLOGY:  RECENT CULTURES:   @ 01:50 .Blood     No growth to date.          RADIOLOGY REVIEWED:  < from: US Kidney and Bladder (19 @ 21:01) >  IMPRESSION:     No hydronephrosis. Small postvoid residual of 16.2 mL.    < end of copied text >  < from: US Extremity Nonvasc Limited, Left (19 @ 20:38) >  INTERPRETATION:  Clinical indication: Left thigh pain and erythema.   Evaluate for left inner thigh hematoma.    Technique: Targeted sonogram of the leftinner thigh using gray scale   sonography and Doppler.    Findings/  impression: Subcutaneous edema is noted. No focal collection is   identified.    < end of copied text >    < from: VA Duplex Lower Ext Vein Scan, Right (19 @ 20:21) >  INTERPRETATION:  Clinical indication: Right calf pain and swelling.    Technique:  Grayscale, color Doppler and spectral Doppler ultrasound was   utilized to evaluate the right lower extremity deep venous system.    Comparison: 2019.    Findings: There is no thrombosis in the right common femoral vein,   femoral vein or popliteal vein. Visualized calf veins are patent.    Impression:      No deep vein thrombosis in the right lower extremity.    < end of copied text >

## 2019-09-03 NOTE — PROGRESS NOTE ADULT - ASSESSMENT
64M with PMHx of CAD (post-three stents), HTN, HLD, T2DM (complicated by peripheral neuropathy), afib on AC presenting with Le cellulitis /fever "not responding to abx " as o/p.  Pt was discharged last week  and since then noted some swelling in RLE and occasional pain... three days ago was seen in ED and was found to have cellulitis of RLE and sent on keflex... yesterday pt had a follow up with cardio and was found to have fever and was told to come to ER however he only reported to Ed today..  still c./o pain in foot though seems to be less..  erythema with some improvement   no N/V/d   no truama   and no hx of gout   in ED was found to have FS>600   met sepsis criteria   BARRY  w CR of 1.7   hyponatremia of sodium 130    1- sepsis sec to cellulitis :  blood culture  neg  cont abx : will complete course   doubt DVT  .. US done two days ago neg ...pt on AC   will repeat US given swelling though improved   clinically improved however with leukocytosis ??  monitor for now   f/u with ID     2- hyponatremia : marjan pseudohyponatermia  improved     3- BARRY : sec to sepsis   hold lasix / ACE for now   still with Elevated Cr   f/u  US and bladder scan   dw renal improving Cr marjan sec to dehydration     4- Afib with RVR : d/w  and Dr. Santana : DCCV today and will evaluate for watchman  cont rate control and AC     5- HTN cont bp meds and monitor     6- DM: uncontrolled  A1c 8.7  FS >600   now improved with humalog   f/u with endo     7- cough :   unclear if residual from ifection however seems to coincide with starting aCE   will hold and re-evaluate as o/p.     8- L thigh pain : no evdeice of hematoma >:  monitor H/H   if continued pain will consider CT

## 2019-09-03 NOTE — PROGRESS NOTE ADULT - ASSESSMENT
65 y/o M w/  Pmhx of  HTN, DMtype2, CAD S/P multiple stents, AF which was first observed in 2016; He was started on Pradaxa and metoprolol and spontaneously reverted to sinus rhythm.  He subsequently discontinued oral AC secondary to gross hematuria.  Admitted w/ right foot cellulitis and possible hematoma after experiencing a severe cramp. He is also currently in Afib w/ RVR for which he was started on Xarelto and his metoprolol was increased. EP consulted 9/2/19 to assist w/ management.     # AFib w/ RVR  # Possible left thigh hematoma; Brillinta dced 9/2/19 now on low dose ASA given hx of stents; Cardiology following   # Cellulitis; on cephalexin    - Recommend increasing lopressor to 50mg BID in order to achieve better rate control.   - Keep NPO for YVES guided DCCV later today. Note that patient will need to be on AC therapy for at least 1 month post DCCV; this was discussed w/ patient and wife at bedside w/ both verbalizing understanding.  - Currently on Xarelto  - Possible outpatient elective Afib ablation to be decided.   - If patient cannot tolerate Xarelto in the future, i.e if he  has significant bleed would possibly  benefit from Watchman procedure     - Hematology consult pending  - Continue telemetry monitoring.     [id number] 63 y/o M w/  Pmhx of  HTN, DMtype2, CAD S/P multiple stents, AF which was first observed in 2016; He was started on Pradaxa and metoprolol and spontaneously reverted to sinus rhythm.  He subsequently discontinued oral AC secondary to gross hematuria.  Admitted w/ right foot cellulitis and possible hematoma after experiencing a severe cramp. He is also currently in Afib w/ RVR for which he was started on Xarelto and his metoprolol was increased. EP consulted 9/2/19 to assist w/ management.     # AFib w/ RVR  # Possible left thigh hematoma; Brillinta dced 9/2/19 now on low dose ASA given hx of stents; Cardiology following   # Cellulitis; on cephalexin    - Recommend increasing lopressor to 50mg BID in order to achieve better rate control.   - Keep NPO for YVES guided DCCV later today. Note that patient will need to be on AC therapy for at least 1 month post DCCV; this was discussed w/ patient and wife at bedside w/ both verbalizing understanding.  - Currently on Xarelto; most recent dose  given 9/2/19 at approx 620 pm   - Possible outpatient elective Afib ablation to be decided.   - If patient cannot tolerate Xarelto in the future, i.e if he  has significant bleed would possibly  benefit from Watchman procedure     - Hematology consult pending  - Continue telemetry monitoring.     [id number] 65 y/o M w/  Pmhx of  HTN, DMtype2, CAD S/P multiple stents, AF which was first observed in 2016; He was started on Pradaxa and metoprolol and spontaneously reverted to sinus rhythm.  He subsequently discontinued oral AC secondary to gross hematuria.  Admitted w/ right foot cellulitis and possible hematoma after experiencing a severe cramp. He is also currently in Afib w/ RVR for which he was started on Xarelto and his metoprolol was increased. EP consulted 9/2/19 to assist w/ management.     # AFib w/ RVR  # Possible left thigh hematoma; Brillinta dced 9/2/19 now on low dose ASA given hx of stents; Cardiology following   # Cellulitis; on cephalexin    - Recommend increasing lopressor to 50mg BID in order to achieve better rate control.   - Keep NPO for YVES guided DCCV later today. Note that patient will need to be on AC therapy for at least 1 month post DCCV; this was discussed w/ patient and wife at bedside w/ both verbalizing understanding.  - Currently on Xarelto; most recent dose  given 9/2/19 at approx 620 pm   - Possible outpatient elective Afib ablation to be decided.   - If patient cannot tolerate Xarelto in the future, i.e if he  has significant bleed would possibly  benefit from Watchman procedure     - Hematology consult pending  - Continue telemetry monitoring.     [id number]    Addendum  Patient still w/ RVR up to 140's; recommend starting Cardizem gtt for now in an attempt at better rate control. NPO post MN for YVES guided DCCV tomorrow ( patient could not be done today).

## 2019-09-03 NOTE — PROGRESS NOTE ADULT - SUBJECTIVE AND OBJECTIVE BOX
Patient is a 64y old  Male who presents with a chief complaint of rt ankle cellulitis (03 Sep 2019 12:32)                                                               INTERVAL HPI/OVERNIGHT EVENTS:    REVIEW OF SYSTEMS:     CONSTITUTIONAL: No weakness, fevers or chills  EYES/ENT: No visual changes , no ear ache   NECK: No pain or stiffness  RESPIRATORY: No cough, wheezing,  No shortness of breath  CARDIOVASCULAR: No chest pain or palpitations  GASTROINTESTINAL: No abdominal pain  . No nausea, vomiting, or hematemesis; No diarrhea or constipation. No melena or hematochezia.  GENITOURINARY: No dysuria, frequency or hematuria  NEUROLOGICAL: No numbness or weakness  MSK : L thigh pain                                                                                                                                                                                                                                                                               Medications:  MEDICATIONS  (STANDING):  amLODIPine   Tablet 5 milliGRAM(s) Oral daily  aspirin enteric coated 81 milliGRAM(s) Oral daily  atorvastatin 80 milliGRAM(s) Oral at bedtime  benzonatate 100 milliGRAM(s) Oral three times a day  cephalexin 500 milliGRAM(s) Oral three times a day  dextrose 5%. 1000 milliLiter(s) (50 mL/Hr) IV Continuous <Continuous>  dextrose 50% Injectable 12.5 Gram(s) IV Push once  dextrose 50% Injectable 25 Gram(s) IV Push once  dextrose 50% Injectable 25 Gram(s) IV Push once  insulin glargine Injectable (LANTUS) 32 Unit(s) SubCutaneous at bedtime  insulin lispro (HumaLOG) corrective regimen sliding scale   SubCutaneous three times a day before meals  insulin lispro (HumaLOG) corrective regimen sliding scale   SubCutaneous at bedtime  insulin lispro Injectable (HumaLOG) 6 Unit(s) SubCutaneous three times a day before meals  rivaroxaban 15 milliGRAM(s) Oral with dinner    MEDICATIONS  (PRN):  acetaminophen   Tablet .. 650 milliGRAM(s) Oral every 6 hours PRN Temp greater or equal to 38C (100.4F)  acetaminophen   Tablet .. 650 milliGRAM(s) Oral every 6 hours PRN Mild Pain (1 - 3)  benzocaine 15 mG/menthol 3.6 mG (Sugar-Free) Lozenge 1 Lozenge Oral four times a day PRN Sore Throat  dextrose 40% Gel 15 Gram(s) Oral once PRN Blood Glucose LESS THAN 70 milliGRAM(s)/deciliter  glucagon  Injectable 1 milliGRAM(s) IntraMuscular once PRN Glucose LESS THAN 70 milligrams/deciliter  guaiFENesin   Syrup  (Sugar-Free) 100 milliGRAM(s) Oral every 6 hours PRN Cough  oxyCODONE    5 mG/acetaminophen 325 mG 2 Tablet(s) Oral every 6 hours PRN Severe Pain (7 - 10)  traMADol 25 milliGRAM(s) Oral every 6 hours PRN Moderate Pain (4 - 6)       Allergies    No Known Allergies    Intolerances      Vital Signs Last 24 Hrs  T(C): 37.6 (03 Sep 2019 13:33), Max: 37.6 (03 Sep 2019 13:33)  T(F): 99.7 (03 Sep 2019 13:33), Max: 99.7 (03 Sep 2019 13:33)  HR: 136 (03 Sep 2019 13:33) (102 - 136)  BP: 154/60 (03 Sep 2019 13:33) (130/67 - 160/77)  BP(mean): --  RR: 18 (03 Sep 2019 13:33) (18 - 18)  SpO2: 98% (03 Sep 2019 13:33) (95% - 98%)  CAPILLARY BLOOD GLUCOSE      POCT Blood Glucose.: 123 mg/dL (03 Sep 2019 11:54)  POCT Blood Glucose.: 104 mg/dL (03 Sep 2019 08:46)  POCT Blood Glucose.: 256 mg/dL (02 Sep 2019 21:37)  POCT Blood Glucose.: 203 mg/dL (02 Sep 2019 19:00)  POCT Blood Glucose.: 64 mg/dL (02 Sep 2019 18:21)  POCT Blood Glucose.: 79 mg/dL (02 Sep 2019 17:57)  POCT Blood Glucose.: 66 mg/dL (02 Sep 2019 17:56)      09-02 @ 07:01 - 09-03 @ 07:00  --------------------------------------------------------  IN: 1300 mL / OUT: 0 mL / NET: 1300 mL    09-03 @ 07:01  -  09-03 @ 16:30  --------------------------------------------------------  IN: 240 mL / OUT: 0 mL / NET: 240 mL      Physical Exam:    General:  NAD   HEENT:  Nonicteric, PERRLA  CV: irreg irreg S1S2   Lungs:  CTA B/L, no wheezes, rales, rhonchi  Abdomen:  Soft, non-tender, no distended, positive BS  Extremities:  no edema  no erythema of RLE   Lthigh ecchymosis     :  No moreno  Neuro:  AAOx3, non-focal, grossly intact                                                                                                                                                                                                                                                                                                LABS:                               13.7   20.0  )-----------( 429      ( 02 Sep 2019 10:08 )             41.6                      09-02    137  |  98  |  19  ----------------------------<  261<H>  4.6   |  26  |  1.19    Ca    10.2      02 Sep 2019 10:08  Mg     1.9     09-02

## 2019-09-03 NOTE — PROGRESS NOTE ADULT - SUBJECTIVE AND OBJECTIVE BOX
Chief complaint  Patient is a 64y old  Male who presents with a chief complaint of rt ankle cellulitis (03 Sep 2019 12:32)   Review of systems  Patient in bed, looks comfortable, no fever, no hypoglycemia.    Labs and Fingersticks  CAPILLARY BLOOD GLUCOSE      POCT Blood Glucose.: 123 mg/dL (03 Sep 2019 11:54)  POCT Blood Glucose.: 104 mg/dL (03 Sep 2019 08:46)  POCT Blood Glucose.: 256 mg/dL (02 Sep 2019 21:37)  POCT Blood Glucose.: 203 mg/dL (02 Sep 2019 19:00)  POCT Blood Glucose.: 64 mg/dL (02 Sep 2019 18:21)  POCT Blood Glucose.: 79 mg/dL (02 Sep 2019 17:57)  POCT Blood Glucose.: 66 mg/dL (02 Sep 2019 17:56)      Anion Gap, Serum: 13 (09-02 @ 10:08)  Anion Gap, Serum: 15 (09-02 @ 00:18)      Calcium, Total Serum: 10.2 (09-02 @ 10:08)  Calcium, Total Serum: 9.3 (09-02 @ 00:18)          09-02    137  |  98  |  19  ----------------------------<  261<H>  4.6   |  26  |  1.19    Ca    10.2      02 Sep 2019 10:08  Mg     1.9     09-02                          13.7   20.0  )-----------( 429      ( 02 Sep 2019 10:08 )             41.6     Medications  MEDICATIONS  (STANDING):  amLODIPine   Tablet 5 milliGRAM(s) Oral daily  aspirin enteric coated 81 milliGRAM(s) Oral daily  atorvastatin 80 milliGRAM(s) Oral at bedtime  benzonatate 100 milliGRAM(s) Oral three times a day  cephalexin 500 milliGRAM(s) Oral three times a day  dextrose 5%. 1000 milliLiter(s) (50 mL/Hr) IV Continuous <Continuous>  dextrose 50% Injectable 12.5 Gram(s) IV Push once  dextrose 50% Injectable 25 Gram(s) IV Push once  dextrose 50% Injectable 25 Gram(s) IV Push once  insulin glargine Injectable (LANTUS) 32 Unit(s) SubCutaneous at bedtime  insulin lispro (HumaLOG) corrective regimen sliding scale   SubCutaneous three times a day before meals  insulin lispro (HumaLOG) corrective regimen sliding scale   SubCutaneous at bedtime  insulin lispro Injectable (HumaLOG) 6 Unit(s) SubCutaneous three times a day before meals  rivaroxaban 15 milliGRAM(s) Oral with dinner      Physical Exam  General: Patient comfortable in bed  Vital Signs Last 12 Hrs  T(F): 99.7 (09-03-19 @ 13:33), Max: 99.7 (09-03-19 @ 13:33)  HR: 136 (09-03-19 @ 13:33) (116 - 136)  BP: 154/60 (09-03-19 @ 13:33) (130/67 - 154/60)  BP(mean): --  RR: 18 (09-03-19 @ 13:33) (18 - 18)  SpO2: 98% (09-03-19 @ 13:33) (96% - 98%)  Neck: No palpable thyroid nodules.  CVS: S1S2, No murmurs  Respiratory: No wheezing, no crepitations  GI: Abdomen soft, bowel sounds positive  Musculoskeletal:  edema lower extremities.   Skin: No skin rashes, no ecchymosis    Diagnostics Chief complaint  Patient is a 64y old  Male who presents with a chief complaint of rt ankle cellulitis (03 Sep 2019 12:32)   Review of systems  Patient in bed, looks comfortable, no fever,  no hypoglycemia.    Labs and Fingersticks  CAPILLARY BLOOD GLUCOSE      POCT Blood Glucose.: 123 mg/dL (03 Sep 2019 11:54)  POCT Blood Glucose.: 104 mg/dL (03 Sep 2019 08:46)  POCT Blood Glucose.: 256 mg/dL (02 Sep 2019 21:37)  POCT Blood Glucose.: 203 mg/dL (02 Sep 2019 19:00)  POCT Blood Glucose.: 64 mg/dL (02 Sep 2019 18:21)  POCT Blood Glucose.: 79 mg/dL (02 Sep 2019 17:57)  POCT Blood Glucose.: 66 mg/dL (02 Sep 2019 17:56)      Anion Gap, Serum: 13 (09-02 @ 10:08)  Anion Gap, Serum: 15 (09-02 @ 00:18)      Calcium, Total Serum: 10.2 (09-02 @ 10:08)  Calcium, Total Serum: 9.3 (09-02 @ 00:18)          09-02    137  |  98  |  19  ----------------------------<  261<H>  4.6   |  26  |  1.19    Ca    10.2      02 Sep 2019 10:08  Mg     1.9     09-02                          13.7   20.0  )-----------( 429      ( 02 Sep 2019 10:08 )             41.6     Medications  MEDICATIONS  (STANDING):  amLODIPine   Tablet 5 milliGRAM(s) Oral daily  aspirin enteric coated 81 milliGRAM(s) Oral daily  atorvastatin 80 milliGRAM(s) Oral at bedtime  benzonatate 100 milliGRAM(s) Oral three times a day  cephalexin 500 milliGRAM(s) Oral three times a day  dextrose 5%. 1000 milliLiter(s) (50 mL/Hr) IV Continuous <Continuous>  dextrose 50% Injectable 12.5 Gram(s) IV Push once  dextrose 50% Injectable 25 Gram(s) IV Push once  dextrose 50% Injectable 25 Gram(s) IV Push once  insulin glargine Injectable (LANTUS) 32 Unit(s) SubCutaneous at bedtime  insulin lispro (HumaLOG) corrective regimen sliding scale   SubCutaneous three times a day before meals  insulin lispro (HumaLOG) corrective regimen sliding scale   SubCutaneous at bedtime  insulin lispro Injectable (HumaLOG) 6 Unit(s) SubCutaneous three times a day before meals  rivaroxaban 15 milliGRAM(s) Oral with dinner      Physical Exam  General: Patient comfortable in bed  Vital Signs Last 12 Hrs  T(F): 99.7 (09-03-19 @ 13:33), Max: 99.7 (09-03-19 @ 13:33)  HR: 136 (09-03-19 @ 13:33) (116 - 136)  BP: 154/60 (09-03-19 @ 13:33) (130/67 - 154/60)  BP(mean): --  RR: 18 (09-03-19 @ 13:33) (18 - 18)  SpO2: 98% (09-03-19 @ 13:33) (96% - 98%)  Neck: No palpable thyroid nodules.  CVS: S1S2, No murmurs  Respiratory: No wheezing, no crepitations  GI: Abdomen soft, bowel sounds positive  Musculoskeletal:  edema lower extremities.   Skin: No skin rashes, no ecchymosis    Diagnostics

## 2019-09-03 NOTE — PROGRESS NOTE ADULT - SUBJECTIVE AND OBJECTIVE BOX
No pain, no shortness of breath      VITAL:  T(C): , Max: 37.4 (19 @ 04:59)  T(F): , Max: 99.4 (19 @ 04:59)  HR: 116 (19 @ 04:59)  BP: 130/67 (19 @ 04:59)  RR: 18 (19 @ 04:59)  SpO2: 96% (19 @ 04:59)      PHYSICAL EXAM:    Constitutional: NAD; Alert  HEENT:  NCAT; DMM  Neck: No JVD; supple  Respiratory: CTA-b/l  Cardiac: tachy s1s2  Gastrointestinal: BS+, soft, NT/ND  Urologic: No moreno  Extremities: No peripheral edema  Back: No CVAT b/l    LABS:                        13.7   20.0  )-----------( 429      ( 02 Sep 2019 10:08 )             41.6     Na(137)/K(4.6)/Cl(98)/HCO3(26)/BUN(19)/Cr(1.19)Glu(261)/Ca(10.2)/Mg(--)/PO4(--)     @ 10:08  Na(133)/K(4.2)/Cl(98)/HCO3(20)/BUN(23)/Cr(1.33)Glu(227)/Ca(9.3)/Mg(1.9)/PO4(--)     @ 00:18  Na(--)/K(--)/Cl(--)/HCO3(--)/BUN(--)/Cr(--)Glu(--)/Ca(--)/Mg(--)/PO4(2.6)     @ 15:12    Urinalysis Basic - ( 01 Sep 2019 19:58 )  Color: Yellow / Appearance: Clear / S.025 / pH: x  Gluc: x / Ketone: Trace  / Bili: Negative / Urobili: <2 mg/dL   Blood: x / Protein: Trace / Nitrite: Negative   Leuk Esterase: Negative / RBC: x / WBC x   Sq Epi: x / Non Sq Epi: x / Bacteria: x  Sodium, Random Urine: 61 mmol/L ( @ 15:11)  Creatinine, Random Urine: 70 mg/dL ( @ 15:11)      ASSESSMENT: 64M with PMHx of HTN, HLD, T2DM, DM neuropathy, afib, and CAD, 19 a/w RLE cellulitis    (1)Renal - CKD 2-3; minimal proteinuria - early diabetic nephropathy? Stable function.  (2)ID - RLE cellulitis - on Keflex  (3)Tachycardia - Cardiology/EP on board    RECOMMENDATIONS:  (1)Dose new meds for GFR 50-60ml/min  (2)Management of tachycardia per primary team/Cardiology/EP          Markell Walton MD  [facility]  [phone] No pain, no shortness of breath      VITAL:  T(C): , Max: 37.4 (19 @ 04:59)  T(F): , Max: 99.4 (19 @ 04:59)  HR: 116 (19 @ 04:59)  BP: 130/67 (19 @ 04:59)  RR: 18 (19 @ 04:59)  SpO2: 96% (19 @ 04:59)      PHYSICAL EXAM:  Constitutional: NAD; Alert  HEENT:  NCAT; DMM  Neck: No JVD; supple  Respiratory: CTA-b/l  Cardiac: tachy irreg s1s2  Gastrointestinal: BS+, soft, NT/ND  Urologic: No moreno  Extremities: No peripheral edema  Back: No CVAT b/l    LABS:                        13.7   20.0  )-----------( 429      ( 02 Sep 2019 10:08 )             41.6     Na(137)/K(4.6)/Cl(98)/HCO3(26)/BUN(19)/Cr(1.19)Glu(261)/Ca(10.2)/Mg(--)/PO4(--)     @ 10:08  Na(133)/K(4.2)/Cl(98)/HCO3(20)/BUN(23)/Cr(1.33)Glu(227)/Ca(9.3)/Mg(1.9)/PO4(--)     @ 00:18  Na(--)/K(--)/Cl(--)/HCO3(--)/BUN(--)/Cr(--)Glu(--)/Ca(--)/Mg(--)/PO4(2.6)     @ 15:12    Urinalysis Basic - ( 01 Sep 2019 19:58 )  Color: Yellow / Appearance: Clear / S.025 / pH: x  Gluc: x / Ketone: Trace  / Bili: Negative / Urobili: <2 mg/dL   Blood: x / Protein: Trace / Nitrite: Negative   Leuk Esterase: Negative / RBC: x / WBC x   Sq Epi: x / Non Sq Epi: x / Bacteria: x  Sodium, Random Urine: 61 mmol/L ( @ 15:11)  Creatinine, Random Urine: 70 mg/dL ( @ 15:11)    IMAGING:  < from: US Kidney and Bladder (19 @ 21:01) >  No hydronephrosis. Small postvoid residual of 16.2 mL.        ASSESSMENT: 64M with PMHx of HTN, HLD, T2DM, DM neuropathy, afib, and CAD, 21 a/w RLE cellulitis    (1)Renal - CKD 2-3; minimal proteinuria - early diabetic nephropathy? Stable function.  (2)ID - RLE cellulitis - on Keflex  (3)Tachycardia - Cardiology/EP on board    RECOMMENDATIONS:  (1)Dose new meds for GFR 50-60ml/min  (2)Management of tachycardia per primary team/Cardiology/EP          Markell Walton MD  [facility]  [phone]

## 2019-09-03 NOTE — PROGRESS NOTE ADULT - SUBJECTIVE AND OBJECTIVE BOX
SUBJECTIVE: Complains of left thigh discomfort. The patient denies chest pain, shortness of breath, arm pain or jaw pain, dizziness or palpitations.    	  MEDICATIONS  (STANDING):  amLODIPine   Tablet 5 milliGRAM(s) Oral daily  aspirin enteric coated 81 milliGRAM(s) Oral daily  atorvastatin 80 milliGRAM(s) Oral at bedtime  benzonatate 100 milliGRAM(s) Oral three times a day  cephalexin 500 milliGRAM(s) Oral three times a day  dextrose 5%. 1000 milliLiter(s) (50 mL/Hr) IV Continuous <Continuous>  dextrose 50% Injectable 12.5 Gram(s) IV Push once  dextrose 50% Injectable 25 Gram(s) IV Push once  dextrose 50% Injectable 25 Gram(s) IV Push once  insulin glargine Injectable (LANTUS) 32 Unit(s) SubCutaneous at bedtime  insulin lispro (HumaLOG) corrective regimen sliding scale   SubCutaneous three times a day before meals  insulin lispro (HumaLOG) corrective regimen sliding scale   SubCutaneous at bedtime  insulin lispro Injectable (HumaLOG) 6 Unit(s) SubCutaneous three times a day before meals  metoprolol succinate ER 25 milliGRAM(s) Oral two times a day  rivaroxaban 15 milliGRAM(s) Oral with dinner  sodium chloride 0.9%. 1000 milliLiter(s) (50 mL/Hr) IV Continuous <Continuous>    MEDICATIONS  (PRN):  acetaminophen   Tablet .. 650 milliGRAM(s) Oral every 6 hours PRN Temp greater or equal to 38C (100.4F)  acetaminophen   Tablet .. 650 milliGRAM(s) Oral every 6 hours PRN Mild Pain (1 - 3)  benzocaine 15 mG/menthol 3.6 mG (Sugar-Free) Lozenge 1 Lozenge Oral four times a day PRN Sore Throat  dextrose 40% Gel 15 Gram(s) Oral once PRN Blood Glucose LESS THAN 70 milliGRAM(s)/deciliter  glucagon  Injectable 1 milliGRAM(s) IntraMuscular once PRN Glucose LESS THAN 70 milligrams/deciliter  guaiFENesin   Syrup  (Sugar-Free) 100 milliGRAM(s) Oral every 6 hours PRN Cough  traMADol 25 milliGRAM(s) Oral every 6 hours PRN Moderate Pain (4 - 6)        REVIEW OF SYSTEMS:    CONSTITUTIONAL: No fever, weight loss, or fatigue  EYES: No eye pain, visual disturbances, or discharge  NECK: No pain or stiffness  RESPIRATORY: No cough, wheezing, chills or hemoptysis; No Shortness of Breath  CARDIOVASCULAR: No chest pain, palpitations, dizziness, or leg swelling  GASTROINTESTINAL: No abdominal or epigastric pain. No nausea, vomiting, or hematemesis; No diarrhea or constipation. No melena or hematochezia.  GENITOURINARY: No dysuria, frequency, hematuria, or incontinence  NEUROLOGICAL: No headaches, memory loss, loss of strength, numbness, or tremors  SKIN: No itching, burning, rashes, or lesions   LYMPH Nodes: No enlarged glands  MUSCULOSKELETAL: No joint pain or swelling; No muscle, back, or extremity pain  All other review of systems are negative.  	  [ ] Unable to obtain    PHYSICAL EXAM:  T(F): 99.4 (19 @ 04:59), Max: 99.4 (19 @ 04:59)  HR: 116 (19 @ 04:59) (102 - 123)  BP: 130/67 (19 @ 04:59) (111/73 - 160/77)  RR: 18 (19 @ 04:59) (18 - 20)  SpO2: 96% (19 @ 04:59) (95% - 98%)  Wt(kg): --  ,   I&O's Summary    02 Sep 2019 07:01  -  03 Sep 2019 07:00  --------------------------------------------------------  IN: 1300 mL / OUT: 0 mL / NET: 1300 mL      PHYSICAL EXAM    Appearance: Normal	  HEENT:   Normal oral mucosa, PERRL, EOMI	  NECK: Soft and supple, No LAD, No JVD  Cardiovascular: Irregular Rate and Rhythm, Normal S1 S2, No murmurs, No clicks, gallops or rubs  Respiratory: Lungs clear to auscultation	  Gastrointestinal:  Soft, Non-tender, + BS	  Skin: No rashes, No ecchymoses, No cyanosis  Neurologic: Non-focal  Extremities: No clubbing, cyanosis or edema  Vascular: Peripheral pulses palpable 2+ bilaterally      LABS:	 	                            13.7   20.0  )-----------( 429      ( 02 Sep 2019 10:08 )             41.6               09-02    137  |  98  |  19  ----------------------------<  261<H>  4.6   |  26  |  1.19    Ca    10.2      02 Sep 2019 10:08  Phos  2.6     09-01  Mg     1.9     09-02             CARDIAC MARKERS ( 01 Sep 2019 15:12 )  x     / x     / 99 U/L / x     / x                  Urinalysis Basic - ( 01 Sep 2019 19:58 )    Color: Yellow / Appearance: Clear / S.025 / pH: x  Gluc: x / Ketone: Trace  / Bili: Negative / Urobili: <2 mg/dL   Blood: x / Protein: Trace / Nitrite: Negative   Leuk Esterase: Negative / RBC: x / WBC x   Sq Epi: x / Non Sq Epi: x / Bacteria: x

## 2019-09-03 NOTE — PROGRESS NOTE ADULT - ASSESSMENT
Assessment /PLAN  DMT2: 64y Male with DM T2 with hyperglycemia, A1C 9% , blood sugars trending within acceptable range, had hypoglycemic episode 9/2, currently NPO.   Cellulitis Left leg: On antibiotics, stable. Has pain and edema legs, being worked up,  ID FU   CAD: on medications, no chest pain, stable, monitored.  HTN: Controlled,  on antihypertensive medications. Assessment /PLAN  DMT2: 64y Male with DM T2 with hyperglycemia, A1C 9% , blood sugars trending within acceptable range, had hypoglycemic episode 9/2, currently NPO.   Cellulitis Left leg: On antibiotics, stable. Has pain and edema legs, positive hematoma. Being worked up,  ID FU   CAD: on medications, no chest pain, stable, monitored.  HTN: Controlled,  on antihypertensive medications. Assessment /PLAN  DMT2: 64y Male with DM T2 with hyperglycemia, A1C 9% , blood sugars trending within acceptable range, had hypoglycemic episode 9/2,  currently NPO.   Cellulitis Left leg: On antibiotics, stable. Has pain and edema legs, positive hematoma. Being worked up,  ID FU   CAD: on medications, no chest pain, stable, monitored.  HTN: Controlled,  on antihypertensive medications.

## 2019-09-03 NOTE — PROGRESS NOTE ADULT - SUBJECTIVE AND OBJECTIVE BOX
INTERVAL HPI/OVERNIGHT EVENTS: Afib w/ -140's briefly up to 170's; Patient seen and examined lying in bed, in no acute distress. He denies chest pain/sob/dizziness/palpitations.     MEDICATIONS  (STANDING):  amLODIPine   Tablet 5 milliGRAM(s) Oral daily  aspirin enteric coated 81 milliGRAM(s) Oral daily  atorvastatin 80 milliGRAM(s) Oral at bedtime  benzonatate 100 milliGRAM(s) Oral three times a day  cephalexin 500 milliGRAM(s) Oral three times a day  dextrose 5%. 1000 milliLiter(s) (50 mL/Hr) IV Continuous <Continuous>  dextrose 50% Injectable 12.5 Gram(s) IV Push once  dextrose 50% Injectable 25 Gram(s) IV Push once  dextrose 50% Injectable 25 Gram(s) IV Push once  insulin glargine Injectable (LANTUS) 32 Unit(s) SubCutaneous at bedtime  insulin lispro (HumaLOG) corrective regimen sliding scale   SubCutaneous three times a day before meals  insulin lispro (HumaLOG) corrective regimen sliding scale   SubCutaneous at bedtime  insulin lispro Injectable (HumaLOG) 6 Unit(s) SubCutaneous three times a day before meals  metoprolol succinate ER 25 milliGRAM(s) Oral two times a day  rivaroxaban 15 milliGRAM(s) Oral with dinner    MEDICATIONS  (PRN):  acetaminophen   Tablet .. 650 milliGRAM(s) Oral every 6 hours PRN Temp greater or equal to 38C (100.4F)  acetaminophen   Tablet .. 650 milliGRAM(s) Oral every 6 hours PRN Mild Pain (1 - 3)  benzocaine 15 mG/menthol 3.6 mG (Sugar-Free) Lozenge 1 Lozenge Oral four times a day PRN Sore Throat  dextrose 40% Gel 15 Gram(s) Oral once PRN Blood Glucose LESS THAN 70 milliGRAM(s)/deciliter  glucagon  Injectable 1 milliGRAM(s) IntraMuscular once PRN Glucose LESS THAN 70 milligrams/deciliter  guaiFENesin   Syrup  (Sugar-Free) 100 milliGRAM(s) Oral every 6 hours PRN Cough  traMADol 25 milliGRAM(s) Oral every 6 hours PRN Moderate Pain (4 - 6)      Allergies    No Known Allergies    Intolerances      ROS:  General: Pt denies fever/chills  Cardiovascular: denies chest pain/palpitations/leg edema  Respiratory and Thorax: denies SOB  Gastrointestinal: denies abdominal pain/diarrhea/bloody stool  Musculoskeletal: + upper left thigh soft swelling; pain when ambulating which patient states has improved since admission.   Hematologic: denies abnormal bleeding    Vital Signs Last 24 Hrs  T(C): 37.4 (03 Sep 2019 04:59), Max: 37.4 (03 Sep 2019 04:59)  T(F): 99.4 (03 Sep 2019 04:59), Max: 99.4 (03 Sep 2019 04:59)  HR: 116 (03 Sep 2019 04:59) (102 - 123)  BP: 130/67 (03 Sep 2019 04:59) (111/73 - 160/77)  BP(mean): --  RR: 18 (03 Sep 2019 04:59) (18 - 20)  SpO2: 96% (03 Sep 2019 04:59) (95% - 98%)    Physical Exam:  Constitutional: well developed, well nourished and in no acute distress  Neurological: Alert & Oriented x 3,  RAMOS   Respiratory: Breathing nonlabored; CTA bilaterally.   Cardiovascular: irregularly irregular   Gastrointestinal: soft, NT, nondistended, (+) BS  Extremities: +2 bilateral radial and DP pulses.   Skin:  + inner left thigh ecchymosis and swelling( resolving based on area marked); +ecchymosis.             No right foot erythema appreciated currently.     LABS:                        13.7   20.0  )-----------( 429      ( 02 Sep 2019 10:08 )             41.6     09-02    137  |  98  |  19  ----------------------------<  261<H>  4.6   |  26  |  1.19    Ca    10.2      02 Sep 2019 10:08  Phos  2.6     09-01  Mg     1.9     09-02        Urinalysis Basic - ( 01 Sep 2019 19:58 )    Color: Yellow / Appearance: Clear / S.025 / pH: x  Gluc: x / Ketone: Trace  / Bili: Negative / Urobili: <2 mg/dL   Blood: x / Protein: Trace / Nitrite: Negative   Leuk Esterase: Negative / RBC: x / WBC x   Sq Epi: x / Non Sq Epi: x / Bacteria: x        RADIOLOGY & ADDITIONAL TESTS: < from: US Extremity Nonvasc Limited, Left (19 @ 20:38) >  NTERPRETATION:  Clinical indication: Left thigh pain and erythema.   Evaluate for left inner thigh hematoma.    Technique: Targeted sonogram of the leftinner thigh using gray scale   sonography and Doppler.    Findings/  impression: Subcutaneous edema is noted. No focal collection is   identified.      TAMMI MOODY M.D., RADIOLOGY RESIDENT  This document has been electronically signed.  JOSH WRIGHT M.D., ATTENDING RADIOLOGIST  This document has been electronically signed. Sep  2 2019  9:13PM            TELE: AFib w/ ventricular rates 100-140's up to 170's at times.

## 2019-09-03 NOTE — PROGRESS NOTE ADULT - ASSESSMENT
64 year old male with a history of CAD S/P multiple stents, chronic AF  presents with fever, erythema swelling and pain of the right foot.  He also has HTN, DM, peripheral neuropathy.  The degree of erythema and swelling is much improved.   In the office it was quite red over the medial malleolus. Left thigh erythema present ? fascial plane hemorrhage from severe cramp. Given h/o spontaneous hematomas - this one being smaller and CHADS VASC 3  - would retain AC with Xarelto.   - cont ABX  - cont Xarelto for thromboembolism prophylaxis. has persistent af  - Was on Brillinta - changed to ASA to reduce overall antiplatelet anticoagulant burden. Had stents > 1 yr ago   - cont metoprolol  - Appreciate consultation from Dr Barnett - EPS - agree with [facility] DCCV today.  +/- AAD with potential future RFA ablation 64 year old male with a history of CAD S/P multiple stents, chronic AF  presents with fever, erythema swelling and pain of the right foot.  He also has HTN, DM, peripheral neuropathy.  The degree of erythema and swelling is much improved.   In the office it was quite red over the medial malleolus. Left thigh erythema present ? fascial plane hemorrhage from severe cramp. Given h/o spontaneous hematomas - this one being smaller and CHADS VASC 3  - would retain AC with Xarelto.   - cont ABX  - cont Xarelto for thromboembolism prophylaxis. has persistent af  - Was on Brillinta - changed to ASA to reduce overall antiplatelet anticoagulant burden. Had stents > 1 yr ago   - cont metoprolol  - Appreciate consultation from Dr Barnett - EPS - agree with [facility] DCCV today.  +/- AAD with potential future RFA ablation   - Left thigh pain likely secondary to hematoma.  Patient having spontaneous bleeding.  Hematology consult, consider a Watchman to avoid A/C in the future.  Discussed with Dr. Mcdaniel

## 2019-09-03 NOTE — PROGRESS NOTE ADULT - ASSESSMENT
65 yo M, DM, CAD, Afib, here briefly for cough, then ED 8/26 for R ankle pain, swelling, redness, given Keflex.    Some improvement, but then low grade fever and returned 8/29    Afebrile here, WBC on admission minimally elevated.  R ankle with resolved  edema; no erythema.    Presentation atypical for cellulitis, no portal of entry.  Prompt resolution of erythema.  Prominent pain, would be more concerned abt crystal dz, ?primary arthritis, ?gout vs pseudogout, ?induced by diuretic use  Still no erythema, edema improved, pain resolved  L thigh ecchymosis, likely hematoma, on anticoagulation, no signs of secondary infection.  Rapid increase in wbc noted, puzzling, no other support for infection.  Awaiting ablation for A Fib  ? stress leukocytosis  Plan:  Limit cephalexin to 1 more day  Follow left leg exam  Follow wbc and diff's.  No ID objection to cardiac ablation.

## 2019-09-04 LAB
ALBUMIN SERPL ELPH-MCNC: 3.6 G/DL — SIGNIFICANT CHANGE UP (ref 3.3–5)
ALP SERPL-CCNC: 115 U/L — SIGNIFICANT CHANGE UP (ref 40–120)
ALT FLD-CCNC: 30 U/L — SIGNIFICANT CHANGE UP (ref 10–45)
ANION GAP SERPL CALC-SCNC: 13 MMOL/L — SIGNIFICANT CHANGE UP (ref 5–17)
AST SERPL-CCNC: 36 U/L — SIGNIFICANT CHANGE UP (ref 10–40)
BASOPHILS # BLD AUTO: 0.06 K/UL — SIGNIFICANT CHANGE UP (ref 0–0.2)
BASOPHILS NFR BLD AUTO: 0.5 % — SIGNIFICANT CHANGE UP (ref 0–2)
BILIRUB SERPL-MCNC: 1.7 MG/DL — HIGH (ref 0.2–1.2)
BUN SERPL-MCNC: 23 MG/DL — SIGNIFICANT CHANGE UP (ref 7–23)
CALCIUM SERPL-MCNC: 9.6 MG/DL — SIGNIFICANT CHANGE UP (ref 8.4–10.5)
CHLORIDE SERPL-SCNC: 96 MMOL/L — SIGNIFICANT CHANGE UP (ref 96–108)
CO2 SERPL-SCNC: 23 MMOL/L — SIGNIFICANT CHANGE UP (ref 22–31)
CREAT SERPL-MCNC: 1.32 MG/DL — HIGH (ref 0.5–1.3)
CULTURE RESULTS: SIGNIFICANT CHANGE UP
EOSINOPHIL # BLD AUTO: 0.54 K/UL — HIGH (ref 0–0.5)
EOSINOPHIL NFR BLD AUTO: 4.2 % — SIGNIFICANT CHANGE UP (ref 0–6)
GLUCOSE BLDC GLUCOMTR-MCNC: 134 MG/DL — HIGH (ref 70–99)
GLUCOSE BLDC GLUCOMTR-MCNC: 198 MG/DL — HIGH (ref 70–99)
GLUCOSE BLDC GLUCOMTR-MCNC: 198 MG/DL — HIGH (ref 70–99)
GLUCOSE BLDC GLUCOMTR-MCNC: 267 MG/DL — HIGH (ref 70–99)
GLUCOSE SERPL-MCNC: 206 MG/DL — HIGH (ref 70–99)
HCT VFR BLD CALC: 33.7 % — LOW (ref 39–50)
HGB BLD-MCNC: 11.3 G/DL — LOW (ref 13–17)
IMM GRANULOCYTES NFR BLD AUTO: 0.4 % — SIGNIFICANT CHANGE UP (ref 0–1.5)
LYMPHOCYTES # BLD AUTO: 1.75 K/UL — SIGNIFICANT CHANGE UP (ref 1–3.3)
LYMPHOCYTES # BLD AUTO: 13.6 % — SIGNIFICANT CHANGE UP (ref 13–44)
MAGNESIUM SERPL-MCNC: 2 MG/DL — SIGNIFICANT CHANGE UP (ref 1.6–2.6)
MCHC RBC-ENTMCNC: 29.5 PG — SIGNIFICANT CHANGE UP (ref 27–34)
MCHC RBC-ENTMCNC: 33.5 GM/DL — SIGNIFICANT CHANGE UP (ref 32–36)
MCV RBC AUTO: 88 FL — SIGNIFICANT CHANGE UP (ref 80–100)
MONOCYTES # BLD AUTO: 1.31 K/UL — HIGH (ref 0–0.9)
MONOCYTES NFR BLD AUTO: 10.1 % — SIGNIFICANT CHANGE UP (ref 2–14)
NEUTROPHILS # BLD AUTO: 9.2 K/UL — HIGH (ref 1.8–7.4)
NEUTROPHILS NFR BLD AUTO: 71.2 % — SIGNIFICANT CHANGE UP (ref 43–77)
PLATELET # BLD AUTO: 340 K/UL — SIGNIFICANT CHANGE UP (ref 150–400)
POTASSIUM SERPL-MCNC: 4.4 MMOL/L — SIGNIFICANT CHANGE UP (ref 3.5–5.3)
POTASSIUM SERPL-SCNC: 4.4 MMOL/L — SIGNIFICANT CHANGE UP (ref 3.5–5.3)
PROT SERPL-MCNC: 6.4 G/DL — SIGNIFICANT CHANGE UP (ref 6–8.3)
RBC # BLD: 3.83 M/UL — LOW (ref 4.2–5.8)
RBC # FLD: 12.6 % — SIGNIFICANT CHANGE UP (ref 10.3–14.5)
SODIUM SERPL-SCNC: 132 MMOL/L — LOW (ref 135–145)
SPECIMEN SOURCE: SIGNIFICANT CHANGE UP
WBC # BLD: 12.91 K/UL — HIGH (ref 3.8–10.5)
WBC # FLD AUTO: 12.91 K/UL — HIGH (ref 3.8–10.5)

## 2019-09-04 PROCEDURE — 92960 CARDIOVERSION ELECTRIC EXT: CPT

## 2019-09-04 PROCEDURE — 93306 TTE W/DOPPLER COMPLETE: CPT | Mod: 26

## 2019-09-04 PROCEDURE — 93312 ECHO TRANSESOPHAGEAL: CPT | Mod: 26

## 2019-09-04 PROCEDURE — 93010 ELECTROCARDIOGRAM REPORT: CPT

## 2019-09-04 RX ORDER — DILTIAZEM HCL 120 MG
180 CAPSULE, EXT RELEASE 24 HR ORAL DAILY
Refills: 0 | Status: DISCONTINUED | OUTPATIENT
Start: 2019-09-05 | End: 2019-09-11

## 2019-09-04 RX ORDER — DILTIAZEM HCL 120 MG
180 CAPSULE, EXT RELEASE 24 HR ORAL DAILY
Refills: 0 | Status: COMPLETED | OUTPATIENT
Start: 2019-09-04 | End: 2019-09-04

## 2019-09-04 RX ADMIN — OXYCODONE AND ACETAMINOPHEN 2 TABLET(S): 5; 325 TABLET ORAL at 12:24

## 2019-09-04 RX ADMIN — Medication 50 MILLIGRAM(S): at 06:24

## 2019-09-04 RX ADMIN — INSULIN GLARGINE 32 UNIT(S): 100 INJECTION, SOLUTION SUBCUTANEOUS at 21:59

## 2019-09-04 RX ADMIN — Medication 500 MILLIGRAM(S): at 06:24

## 2019-09-04 RX ADMIN — Medication 2: at 00:02

## 2019-09-04 RX ADMIN — OXYCODONE AND ACETAMINOPHEN 2 TABLET(S): 5; 325 TABLET ORAL at 21:11

## 2019-09-04 RX ADMIN — Medication 100 MILLIGRAM(S): at 12:24

## 2019-09-04 RX ADMIN — Medication 10 MG/HR: at 00:03

## 2019-09-04 RX ADMIN — Medication 50 MILLIGRAM(S): at 17:13

## 2019-09-04 RX ADMIN — Medication 2: at 13:07

## 2019-09-04 RX ADMIN — BENZOCAINE AND MENTHOL 1 LOZENGE: 5; 1 LIQUID ORAL at 21:13

## 2019-09-04 RX ADMIN — AMLODIPINE BESYLATE 5 MILLIGRAM(S): 2.5 TABLET ORAL at 06:24

## 2019-09-04 RX ADMIN — Medication 6 UNIT(S): at 13:07

## 2019-09-04 RX ADMIN — Medication 180 MILLIGRAM(S): at 18:59

## 2019-09-04 RX ADMIN — OXYCODONE AND ACETAMINOPHEN 2 TABLET(S): 5; 325 TABLET ORAL at 22:00

## 2019-09-04 RX ADMIN — OXYCODONE AND ACETAMINOPHEN 2 TABLET(S): 5; 325 TABLET ORAL at 04:10

## 2019-09-04 RX ADMIN — ATORVASTATIN CALCIUM 80 MILLIGRAM(S): 80 TABLET, FILM COATED ORAL at 21:13

## 2019-09-04 RX ADMIN — Medication 81 MILLIGRAM(S): at 12:24

## 2019-09-04 RX ADMIN — Medication 100 MILLIGRAM(S): at 21:13

## 2019-09-04 RX ADMIN — Medication 5 MG/HR: at 03:49

## 2019-09-04 RX ADMIN — OXYCODONE AND ACETAMINOPHEN 2 TABLET(S): 5; 325 TABLET ORAL at 13:53

## 2019-09-04 RX ADMIN — OXYCODONE AND ACETAMINOPHEN 2 TABLET(S): 5; 325 TABLET ORAL at 04:30

## 2019-09-04 RX ADMIN — Medication 500 MILLIGRAM(S): at 12:24

## 2019-09-04 RX ADMIN — Medication 6 UNIT(S): at 17:27

## 2019-09-04 RX ADMIN — INSULIN GLARGINE 25 UNIT(S): 100 INJECTION, SOLUTION SUBCUTANEOUS at 00:02

## 2019-09-04 RX ADMIN — Medication 2: at 22:00

## 2019-09-04 RX ADMIN — Medication 100 MILLIGRAM(S): at 06:24

## 2019-09-04 RX ADMIN — RIVAROXABAN 15 MILLIGRAM(S): KIT at 17:13

## 2019-09-04 NOTE — PROGRESS NOTE ADULT - SUBJECTIVE AND OBJECTIVE BOX
Chief complaint  Patient is a 64y old  Male who presents with a chief complaint of rt ankle cellulitis (03 Sep 2019 12:32)   Review of systems  Patient in bed, looks comfortable, no fever,  no hypoglycemia.    Labs and Fingersticks  CAPILLARY BLOOD GLUCOSE      POCT Blood Glucose.: 198 mg/dL (04 Sep 2019 05:19)  POCT Blood Glucose.: 265 mg/dL (03 Sep 2019 23:56)  POCT Blood Glucose.: 270 mg/dL (03 Sep 2019 22:00)  POCT Blood Glucose.: 343 mg/dL (03 Sep 2019 18:25)      Anion Gap, Serum: 13 (09-04 @ 06:31)      Calcium, Total Serum: 9.6 (09-04 @ 06:31)  Albumin, Serum: 3.6 (09-04 @ 06:31)    Alanine Aminotransferase (ALT/SGPT): 30 (09-04 @ 06:31)  Alkaline Phosphatase, Serum: 115 (09-04 @ 06:31)  Aspartate Aminotransferase (AST/SGOT): 36 (09-04 @ 06:31)        09-04    132<L>  |  96  |  23  ----------------------------<  206<H>  4.4   |  23  |  1.32<H>    Ca    9.6      04 Sep 2019 06:31  Mg     2.0     09-04    TPro  6.4  /  Alb  3.6  /  TBili  1.7<H>  /  DBili  x   /  AST  36  /  ALT  30  /  AlkPhos  115  09-04                        11.3   12.91 )-----------( 340      ( 04 Sep 2019 08:53 )             33.7     Medications  MEDICATIONS  (STANDING):  amLODIPine   Tablet 5 milliGRAM(s) Oral daily  aspirin enteric coated 81 milliGRAM(s) Oral daily  atorvastatin 80 milliGRAM(s) Oral at bedtime  benzonatate 100 milliGRAM(s) Oral three times a day  cephalexin 500 milliGRAM(s) Oral three times a day  dextrose 5%. 1000 milliLiter(s) (50 mL/Hr) IV Continuous <Continuous>  dextrose 50% Injectable 12.5 Gram(s) IV Push once  dextrose 50% Injectable 25 Gram(s) IV Push once  dextrose 50% Injectable 25 Gram(s) IV Push once  diltiazem Infusion 5 mG/Hr (5 mL/Hr) IV Continuous <Continuous>  insulin glargine Injectable (LANTUS) 32 Unit(s) SubCutaneous at bedtime  insulin lispro (HumaLOG) corrective regimen sliding scale   SubCutaneous three times a day before meals  insulin lispro (HumaLOG) corrective regimen sliding scale   SubCutaneous at bedtime  insulin lispro Injectable (HumaLOG) 6 Unit(s) SubCutaneous three times a day before meals  metoprolol succinate ER 50 milliGRAM(s) Oral two times a day  rivaroxaban 15 milliGRAM(s) Oral with dinner      Physical Exam  General: Patient comfortable in bed  Vital Signs Last 12 Hrs  T(F): 97.8 (09-04-19 @ 12:19), Max: 98.4 (09-04-19 @ 04:31)  HR: 106 (09-04-19 @ 12:19) (94 - 106)  BP: 125/71 (09-04-19 @ 12:19) (121/74 - 140/86)  BP(mean): --  RR: 18 (09-04-19 @ 12:19) (18 - 18)  SpO2: 97% (09-04-19 @ 12:19) (96% - 97%)  Neck: No palpable thyroid nodules.  CVS: S1S2, No murmurs  Respiratory: No wheezing, no crepitations  GI: Abdomen soft, bowel sounds positive  Musculoskeletal:  edema lower extremities.   Skin: No skin rashes, no ecchymosis    Diagnostics Chief complaint  Patient is a 64y old  Male who presents with a chief complaint of rt ankle cellulitis (03 Sep 2019 12:32)   Review of systems  Patient in bed, looks comfortable, no fever, no hypoglycemia.    Labs and Fingersticks  CAPILLARY BLOOD GLUCOSE      POCT Blood Glucose.: 198 mg/dL (04 Sep 2019 05:19)  POCT Blood Glucose.: 265 mg/dL (03 Sep 2019 23:56)  POCT Blood Glucose.: 270 mg/dL (03 Sep 2019 22:00)  POCT Blood Glucose.: 343 mg/dL (03 Sep 2019 18:25)      Anion Gap, Serum: 13 (09-04 @ 06:31)      Calcium, Total Serum: 9.6 (09-04 @ 06:31)  Albumin, Serum: 3.6 (09-04 @ 06:31)    Alanine Aminotransferase (ALT/SGPT): 30 (09-04 @ 06:31)  Alkaline Phosphatase, Serum: 115 (09-04 @ 06:31)  Aspartate Aminotransferase (AST/SGOT): 36 (09-04 @ 06:31)        09-04    132<L>  |  96  |  23  ----------------------------<  206<H>  4.4   |  23  |  1.32<H>    Ca    9.6      04 Sep 2019 06:31  Mg     2.0     09-04    TPro  6.4  /  Alb  3.6  /  TBili  1.7<H>  /  DBili  x   /  AST  36  /  ALT  30  /  AlkPhos  115  09-04                        11.3   12.91 )-----------( 340      ( 04 Sep 2019 08:53 )             33.7     Medications  MEDICATIONS  (STANDING):  amLODIPine   Tablet 5 milliGRAM(s) Oral daily  aspirin enteric coated 81 milliGRAM(s) Oral daily  atorvastatin 80 milliGRAM(s) Oral at bedtime  benzonatate 100 milliGRAM(s) Oral three times a day  cephalexin 500 milliGRAM(s) Oral three times a day  dextrose 5%. 1000 milliLiter(s) (50 mL/Hr) IV Continuous <Continuous>  dextrose 50% Injectable 12.5 Gram(s) IV Push once  dextrose 50% Injectable 25 Gram(s) IV Push once  dextrose 50% Injectable 25 Gram(s) IV Push once  diltiazem Infusion 5 mG/Hr (5 mL/Hr) IV Continuous <Continuous>  insulin glargine Injectable (LANTUS) 32 Unit(s) SubCutaneous at bedtime  insulin lispro (HumaLOG) corrective regimen sliding scale   SubCutaneous three times a day before meals  insulin lispro (HumaLOG) corrective regimen sliding scale   SubCutaneous at bedtime  insulin lispro Injectable (HumaLOG) 6 Unit(s) SubCutaneous three times a day before meals  metoprolol succinate ER 50 milliGRAM(s) Oral two times a day  rivaroxaban 15 milliGRAM(s) Oral with dinner      Physical Exam  General: Patient comfortable in bed  Vital Signs Last 12 Hrs  T(F): 97.8 (09-04-19 @ 12:19), Max: 98.4 (09-04-19 @ 04:31)  HR: 106 (09-04-19 @ 12:19) (94 - 106)  BP: 125/71 (09-04-19 @ 12:19) (121/74 - 140/86)  BP(mean): --  RR: 18 (09-04-19 @ 12:19) (18 - 18)  SpO2: 97% (09-04-19 @ 12:19) (96% - 97%)  Neck: No palpable thyroid nodules.  CVS: S1S2, No murmurs  Respiratory: No wheezing, no crepitations  GI: Abdomen soft, bowel sounds positive  Musculoskeletal:  edema lower extremities.   Skin: No skin rashes, no ecchymosis    Diagnostics

## 2019-09-04 NOTE — PROGRESS NOTE ADULT - SUBJECTIVE AND OBJECTIVE BOX
CC: f/u for RLE cellulitis    Patient reports: improved left thigh pain.Rt ankle pain improved    REVIEW OF SYSTEMS:  All other review of systems negative (Comprehensive ROS)    Antimicrobials Day #  :day 7/7  cephalexin 500 milliGRAM(s) Oral three times a day    Other Medications Reviewed    T(F): 97.8 (09-04-19 @ 12:19), Max: 98.8 (09-03-19 @ 21:15)  HR: 106 (09-04-19 @ 12:19)  BP: 125/71 (09-04-19 @ 12:19)  RR: 18 (09-04-19 @ 12:19)  SpO2: 97% (09-04-19 @ 12:19)  Wt(kg): --    PHYSICAL EXAM:  General: alert, no acute distress  Eyes:  anicteric, no conjunctival injection, no discharge  Oropharynx: no lesions or injection 	  Neck: supple, without adenopathy  Lungs: clear to auscultation  Heart: irregular rate and rhythm; no murmur, rubs or gallops  Abdomen: soft, nondistended, nontender, without mass or organomegaly  Skin: left thigh ecchymottic  Extremities: no clubbing, cyanosis, or edema  Neurologic: alert, oriented, moves all extremities    LAB RESULTS:                        11.3   12.91 )-----------( 340      ( 04 Sep 2019 08:53 )             33.7     09-04    132<L>  |  96  |  23  ----------------------------<  206<H>  4.4   |  23  |  1.32<H>    Ca    9.6      04 Sep 2019 06:31  Mg     2.0     09-04    TPro  6.4  /  Alb  3.6  /  TBili  1.7<H>  /  DBili  x   /  AST  36  /  ALT  30  /  AlkPhos  115  09-04    LIVER FUNCTIONS - ( 04 Sep 2019 06:31 )  Alb: 3.6 g/dL / Pro: 6.4 g/dL / ALK PHOS: 115 U/L / ALT: 30 U/L / AST: 36 U/L / GGT: x             MICROBIOLOGY:  RECENT CULTURES:      RADIOLOGY REVIEWED:

## 2019-09-04 NOTE — PROGRESS NOTE ADULT - SUBJECTIVE AND OBJECTIVE BOX
Patient is a 64y old  Male who presents with a chief complaint of rt ankle pain, BARRY, cellulitis (04 Sep 2019 14:25)                                                               INTERVAL HPI/OVERNIGHT EVENTS:    REVIEW OF SYSTEMS:     CONSTITUTIONAL: No weakness, fevers or chills  EYES/ENT: No visual changes , no ear ache   NECK: No pain or stiffness  RESPIRATORY: No cough, wheezing,  No shortness of breath  CARDIOVASCULAR: No chest pain or palpitations  GASTROINTESTINAL: No abdominal pain  . No nausea, vomiting, or hematemesis; No diarrhea or constipation. No melena or hematochezia.  GENITOURINARY: No dysuria, frequency or hematuria  NEUROLOGICAL: No numbness or weakness  SKIN: No itching, burning, rashes, or lesions                                                                                                                                                                                                                                                                                 Medications:  MEDICATIONS  (STANDING):  amLODIPine   Tablet 5 milliGRAM(s) Oral daily  aspirin enteric coated 81 milliGRAM(s) Oral daily  atorvastatin 80 milliGRAM(s) Oral at bedtime  benzonatate 100 milliGRAM(s) Oral three times a day  dextrose 5%. 1000 milliLiter(s) (50 mL/Hr) IV Continuous <Continuous>  dextrose 50% Injectable 12.5 Gram(s) IV Push once  dextrose 50% Injectable 25 Gram(s) IV Push once  dextrose 50% Injectable 25 Gram(s) IV Push once  insulin glargine Injectable (LANTUS) 32 Unit(s) SubCutaneous at bedtime  insulin lispro (HumaLOG) corrective regimen sliding scale   SubCutaneous three times a day before meals  insulin lispro (HumaLOG) corrective regimen sliding scale   SubCutaneous at bedtime  insulin lispro Injectable (HumaLOG) 6 Unit(s) SubCutaneous three times a day before meals  metoprolol succinate ER 50 milliGRAM(s) Oral two times a day  rivaroxaban 15 milliGRAM(s) Oral with dinner    MEDICATIONS  (PRN):  acetaminophen   Tablet .. 650 milliGRAM(s) Oral every 6 hours PRN Temp greater or equal to 38C (100.4F)  acetaminophen   Tablet .. 650 milliGRAM(s) Oral every 6 hours PRN Mild Pain (1 - 3)  benzocaine 15 mG/menthol 3.6 mG (Sugar-Free) Lozenge 1 Lozenge Oral four times a day PRN Sore Throat  dextrose 40% Gel 15 Gram(s) Oral once PRN Blood Glucose LESS THAN 70 milliGRAM(s)/deciliter  glucagon  Injectable 1 milliGRAM(s) IntraMuscular once PRN Glucose LESS THAN 70 milligrams/deciliter  guaiFENesin   Syrup  (Sugar-Free) 100 milliGRAM(s) Oral every 6 hours PRN Cough  oxyCODONE    5 mG/acetaminophen 325 mG 2 Tablet(s) Oral every 6 hours PRN Severe Pain (7 - 10)  traMADol 25 milliGRAM(s) Oral every 6 hours PRN Moderate Pain (4 - 6)       Allergies    No Known Allergies    Intolerances      Vital Signs Last 24 Hrs  T(C): 37.8 (04 Sep 2019 21:07), Max: 37.9 (04 Sep 2019 20:39)  T(F): 100 (04 Sep 2019 21:07), Max: 100.3 (04 Sep 2019 20:39)  HR: 114 (04 Sep 2019 20:39) (93 - 132)  BP: 146/77 (04 Sep 2019 20:39) (121/71 - 146/77)  BP(mean): --  RR: 18 (04 Sep 2019 20:39) (18 - 18)  SpO2: 97% (04 Sep 2019 20:39) (93% - 97%)  CAPILLARY BLOOD GLUCOSE      POCT Blood Glucose.: 134 mg/dL (04 Sep 2019 16:49)  POCT Blood Glucose.: 198 mg/dL (04 Sep 2019 12:28)  POCT Blood Glucose.: 198 mg/dL (04 Sep 2019 05:19)  POCT Blood Glucose.: 265 mg/dL (03 Sep 2019 23:56)  POCT Blood Glucose.: 270 mg/dL (03 Sep 2019 22:00)      09-03 @ 07:01 - 09-04 @ 07:00  --------------------------------------------------------  IN: 640 mL / OUT: 370 mL / NET: 270 mL    09-04 @ 07:01 - 09-04 @ 21:34  --------------------------------------------------------  IN: 460 mL / OUT: 250 mL / NET: 210 mL      Physical Exam:    Daily     Daily   General:  Well appearing, NAD, not cachetic  HEENT:  Nonicteric, PERRLA  CV:  RRR, S1S2   Lungs:  CTA B/L, no wheezes, rales, rhonchi  Abdomen:  Soft, non-tender, no distended, positive BS  Extremities:  2+ pulses, no c/c, no edema  Skin:  Warm and dry, no rashes  :  No moreno  Neuro:  AAOx3, non-focal, grossly intact                                                                                                                                                                                                                                                                                                LABS:                               11.3   12.91 )-----------( 340      ( 04 Sep 2019 08:53 )             33.7                      09-04    132<L>  |  96  |  23  ----------------------------<  206<H>  4.4   |  23  |  1.32<H>    Ca    9.6      04 Sep 2019 06:31  Mg     2.0     09-04    TPro  6.4  /  Alb  3.6  /  TBili  1.7<H>  /  DBili  x   /  AST  36  /  ALT  30  /  AlkPhos  115  09-04                         Time spent :
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
## 2019-09-04 NOTE — CONSULT NOTE ADULT - SUBJECTIVE AND OBJECTIVE BOX
Chief Complaint:  Patient is a 64y old  Male who presents with a chief complaint of rt ankle pain, BARRY, cellulitis (04 Sep 2019 14:25)      HPI: patient initially admitted for RLE cellulitis which  says resolved.  he now has LLE swelling and a red patch on inner left thigh.  ID is following.  he was on antibiotics, but they have been stopped.  he does have anemia and asked to evaluate.  he denies a history of anemia.      Medications:  acetaminophen   Tablet .. 650 milliGRAM(s) Oral every 6 hours PRN  acetaminophen   Tablet .. 650 milliGRAM(s) Oral every 6 hours PRN  amLODIPine   Tablet 5 milliGRAM(s) Oral daily  aspirin enteric coated 81 milliGRAM(s) Oral daily  atorvastatin 80 milliGRAM(s) Oral at bedtime  benzocaine 15 mG/menthol 3.6 mG (Sugar-Free) Lozenge 1 Lozenge Oral four times a day PRN  benzonatate 100 milliGRAM(s) Oral three times a day  dextrose 40% Gel 15 Gram(s) Oral once PRN  dextrose 5%. 1000 milliLiter(s) IV Continuous <Continuous>  dextrose 50% Injectable 12.5 Gram(s) IV Push once  dextrose 50% Injectable 25 Gram(s) IV Push once  dextrose 50% Injectable 25 Gram(s) IV Push once  diltiazem    milliGRAM(s) Oral daily  glucagon  Injectable 1 milliGRAM(s) IntraMuscular once PRN  guaiFENesin   Syrup  (Sugar-Free) 100 milliGRAM(s) Oral every 6 hours PRN  insulin glargine Injectable (LANTUS) 32 Unit(s) SubCutaneous at bedtime  insulin lispro (HumaLOG) corrective regimen sliding scale   SubCutaneous three times a day before meals  insulin lispro (HumaLOG) corrective regimen sliding scale   SubCutaneous at bedtime  insulin lispro Injectable (HumaLOG) 6 Unit(s) SubCutaneous three times a day before meals  metoprolol succinate ER 50 milliGRAM(s) Oral two times a day  oxyCODONE    5 mG/acetaminophen 325 mG 2 Tablet(s) Oral every 6 hours PRN  rivaroxaban 15 milliGRAM(s) Oral with dinner  traMADol 25 milliGRAM(s) Oral every 6 hours PRN    Allergies:  No Known Allergies    FAMILY HISTORY:  Family history of heart disease: father  age 71  No malignancies in family      social history: , works. Denies tobacco, ETOH, and IVDA    PAST MEDICAL & SURGICAL HISTORY:  Afib  Essential hypertension, hypertension with unspecified goal  Hyperlipidemia, unspecified hyperlipidemia type  Type 2 diabetes mellitus  CAD in native artery  Former smoker  s/p Carotid Endarterectomy: left    REVIEW OF SYSTEMS      General: appetite is okay and denies weight loss.  Energy is okay.  Denies fevers, chills, and sweats	    Skin/Breast: denies itching and bruising  	  Ophthalmologic: Denies vision changes  	  ENMT:	Denies hearing loss, nosebleeds, sore throat    Respiratory and Thorax: Denies cough, SOB, wheeze, hemoptysis  	  Cardiovascular:	 Denies chest pain and palpitations    Gastrointestinal:	Denies N/V/D/C, abd pain, BRBPR    Genitourinary:	Denies dysuria and ematuria    Musculoskeletal:	 Denies back pain.  Denies joint pain    Neurological:	Denies HA, dizziness, numbness, tingling    Psychiatric: Denies insomnia    Vitals:  Vital Signs Last 24 Hrs  T(C): 36.6 (04 Sep 2019 12:19), Max: 37.1 (03 Sep 2019 21:15)  T(F): 97.8 (04 Sep 2019 12:19), Max: 98.8 (03 Sep 2019 21:15)  HR: 111 (04 Sep 2019 17:11) (93 - 132)  BP: 137/65 (04 Sep 2019 17:11) (121/71 - 150/80)  BP(mean): --  RR: 18 (04 Sep 2019 12:19) (18 - 18)  SpO2: 97% (04 Sep 2019 12:19) (93% - 97%)    Pex:  alert NAD  EOMI anicteric sclera  Neck Supple No LNA  Cv s1 S2 Irregular  Lungs clear B/L  abd soft NT ND +BS  No RLE edema or tenderness.  left thigh swelling and redness    Labs:                        11.3   12.91 )-----------( 340      ( 04 Sep 2019 08:53 )             33.7     CBC Full  -  ( 04 Sep 2019 08:53 )  WBC Count : 12.91 K/uL  RBC Count : 3.83 M/uL  Hemoglobin : 11.3 g/dL  Hematocrit : 33.7 %  Platelet Count - Automated : 340 K/uL  Mean Cell Volume : 88.0 fl  Mean Cell Hemoglobin : 29.5 pg  Mean Cell Hemoglobin Concentration : 33.5 gm/dL  Auto Neutrophil # : 9.20 K/uL  Auto Lymphocyte # : 1.75 K/uL  Auto Monocyte # : 1.31 K/uL  Auto Eosinophil # : 0.54 K/uL  Auto Basophil # : 0.06 K/uL  Auto Neutrophil % : 71.2 %  Auto Lymphocyte % : 13.6 %  Auto Monocyte % : 10.1 %  Auto Eosinophil % : 4.2 %  Auto Basophil % : 0.5 %        132<L>  |  96  |  23  ----------------------------<  206<H>  4.4   |  23  |  1.32<H>    Ca    9.6      04 Sep 2019 06:31  Mg     2.0         TPro  6.4  /  Alb  3.6  /  TBili  1.7<H>  /  DBili  x   /  AST  36  /  ALT  30  /  AlkPhos  115        [id number]
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
## 2019-09-04 NOTE — PROGRESS NOTE ADULT - ASSESSMENT
Assessment /PLAN:  DMT2: 64y Male with DM T2 with hyperglycemia, A1C 9% , blood sugars trending within acceptable range, had hyperglycemic episode after not receiving his insulin. He is currently NPO without complaints.   Cellulitis Left leg: On antibiotics, stable. Has pain and edema of the legs, positive hematoma. Being worked up, ID FU   CAD: On medications, no chest pain, stable, monitored  HTN: Controlled,  on antihypertensive medications.

## 2019-09-04 NOTE — PROGRESS NOTE ADULT - ASSESSMENT
63 yo M, DM, CAD, Afib, here briefly for cough, then ED 8/26 for R ankle pain, swelling, redness, given Keflex.    Some improvement, but then low grade fever and returned 8/29    Afebrile here, WBC on admission minimally elevated.  R ankle with resolved  edema; no erythema.    Presentation atypical for cellulitis, no portal of entry.  Prompt resolution of erythema.  Prominent pain, would be more concerned abt crystal dz, ?primary arthritis, ?gout vs pseudogout, ?induced by diuretic use  Still no erythema, edema improved, pain resolved  L thigh ecchymosis, likely hematoma, on anticoagulation, no signs of secondary infection.  Rapid increase in wbc noted, puzzling, no other support for infection.  Awaiting ablation for A Fib  ? stress leukocytosis which has moderated  Ultrasound of left leg without any defined collection  Plan:  stop cephalexin, observe off antibiotics  Follow left leg exam  Follow wbc and diff's.  No ID objection to cardiac ablation and or genovevaman

## 2019-09-05 LAB
ANION GAP SERPL CALC-SCNC: 12 MMOL/L — SIGNIFICANT CHANGE UP (ref 5–17)
APPEARANCE UR: CLEAR — SIGNIFICANT CHANGE UP
BILIRUB UR-MCNC: NEGATIVE — SIGNIFICANT CHANGE UP
BUN SERPL-MCNC: 24 MG/DL — HIGH (ref 7–23)
CALCIUM SERPL-MCNC: 9.4 MG/DL — SIGNIFICANT CHANGE UP (ref 8.4–10.5)
CHLORIDE SERPL-SCNC: 94 MMOL/L — LOW (ref 96–108)
CO2 SERPL-SCNC: 22 MMOL/L — SIGNIFICANT CHANGE UP (ref 22–31)
COLOR SPEC: YELLOW — SIGNIFICANT CHANGE UP
CREAT SERPL-MCNC: 1.36 MG/DL — HIGH (ref 0.5–1.3)
DIFF PNL FLD: ABNORMAL
FERRITIN SERPL-MCNC: 313 NG/ML — SIGNIFICANT CHANGE UP (ref 30–400)
FOLATE SERPL-MCNC: 14.6 NG/ML — SIGNIFICANT CHANGE UP
GLUCOSE BLDC GLUCOMTR-MCNC: 194 MG/DL — HIGH (ref 70–99)
GLUCOSE BLDC GLUCOMTR-MCNC: 207 MG/DL — HIGH (ref 70–99)
GLUCOSE BLDC GLUCOMTR-MCNC: 223 MG/DL — HIGH (ref 70–99)
GLUCOSE BLDC GLUCOMTR-MCNC: 85 MG/DL — SIGNIFICANT CHANGE UP (ref 70–99)
GLUCOSE SERPL-MCNC: 210 MG/DL — HIGH (ref 70–99)
GLUCOSE UR QL: ABNORMAL
HAPTOGLOB SERPL-MCNC: 369 MG/DL — HIGH (ref 34–200)
HCT VFR BLD CALC: 31.8 % — LOW (ref 39–50)
HGB BLD-MCNC: 10.9 G/DL — LOW (ref 13–17)
IRON SATN MFR SERPL: 16 UG/DL — LOW (ref 45–165)
IRON SATN MFR SERPL: 6 % — LOW (ref 16–55)
KETONES UR-MCNC: NEGATIVE — SIGNIFICANT CHANGE UP
LDH SERPL L TO P-CCNC: 183 U/L — SIGNIFICANT CHANGE UP (ref 50–242)
LEUKOCYTE ESTERASE UR-ACNC: NEGATIVE — SIGNIFICANT CHANGE UP
MCHC RBC-ENTMCNC: 30.2 PG — SIGNIFICANT CHANGE UP (ref 27–34)
MCHC RBC-ENTMCNC: 34.3 GM/DL — SIGNIFICANT CHANGE UP (ref 32–36)
MCV RBC AUTO: 88.1 FL — SIGNIFICANT CHANGE UP (ref 80–100)
NITRITE UR-MCNC: NEGATIVE — SIGNIFICANT CHANGE UP
PH UR: 5.5 — SIGNIFICANT CHANGE UP (ref 5–8)
PLATELET # BLD AUTO: 329 K/UL — SIGNIFICANT CHANGE UP (ref 150–400)
POTASSIUM SERPL-MCNC: 4.3 MMOL/L — SIGNIFICANT CHANGE UP (ref 3.5–5.3)
POTASSIUM SERPL-SCNC: 4.3 MMOL/L — SIGNIFICANT CHANGE UP (ref 3.5–5.3)
PROT UR-MCNC: ABNORMAL
RBC # BLD: 3.61 M/UL — LOW (ref 4.2–5.8)
RBC # FLD: 12.3 % — SIGNIFICANT CHANGE UP (ref 10.3–14.5)
SODIUM SERPL-SCNC: 128 MMOL/L — LOW (ref 135–145)
SP GR SPEC: 1.02 — SIGNIFICANT CHANGE UP (ref 1.01–1.02)
TIBC SERPL-MCNC: 272 UG/DL — SIGNIFICANT CHANGE UP (ref 220–430)
UIBC SERPL-MCNC: 256 UG/DL — SIGNIFICANT CHANGE UP (ref 110–370)
UROBILINOGEN FLD QL: SIGNIFICANT CHANGE UP
VIT B12 SERPL-MCNC: 646 PG/ML — SIGNIFICANT CHANGE UP (ref 232–1245)
WBC # BLD: 14.84 K/UL — HIGH (ref 3.8–10.5)
WBC # FLD AUTO: 14.84 K/UL — HIGH (ref 3.8–10.5)

## 2019-09-05 PROCEDURE — 99232 SBSQ HOSP IP/OBS MODERATE 35: CPT

## 2019-09-05 PROCEDURE — 99254 IP/OBS CNSLTJ NEW/EST MOD 60: CPT

## 2019-09-05 PROCEDURE — 73700 CT LOWER EXTREMITY W/O DYE: CPT | Mod: 26,50

## 2019-09-05 RX ORDER — TAMSULOSIN HYDROCHLORIDE 0.4 MG/1
0.4 CAPSULE ORAL AT BEDTIME
Refills: 0 | Status: DISCONTINUED | OUTPATIENT
Start: 2019-09-05 | End: 2019-09-11

## 2019-09-05 RX ADMIN — INSULIN GLARGINE 32 UNIT(S): 100 INJECTION, SOLUTION SUBCUTANEOUS at 21:34

## 2019-09-05 RX ADMIN — Medication 81 MILLIGRAM(S): at 11:45

## 2019-09-05 RX ADMIN — Medication 180 MILLIGRAM(S): at 05:44

## 2019-09-05 RX ADMIN — OXYCODONE AND ACETAMINOPHEN 2 TABLET(S): 5; 325 TABLET ORAL at 04:30

## 2019-09-05 RX ADMIN — Medication 50 MILLIGRAM(S): at 05:45

## 2019-09-05 RX ADMIN — BENZOCAINE AND MENTHOL 1 LOZENGE: 5; 1 LIQUID ORAL at 21:41

## 2019-09-05 RX ADMIN — Medication 2: at 08:01

## 2019-09-05 RX ADMIN — AMLODIPINE BESYLATE 5 MILLIGRAM(S): 2.5 TABLET ORAL at 05:45

## 2019-09-05 RX ADMIN — OXYCODONE AND ACETAMINOPHEN 2 TABLET(S): 5; 325 TABLET ORAL at 03:52

## 2019-09-05 RX ADMIN — ATORVASTATIN CALCIUM 80 MILLIGRAM(S): 80 TABLET, FILM COATED ORAL at 21:34

## 2019-09-05 RX ADMIN — Medication 100 MILLIGRAM(S): at 05:45

## 2019-09-05 RX ADMIN — Medication 100 MILLIGRAM(S): at 21:34

## 2019-09-05 RX ADMIN — Medication 6 UNIT(S): at 08:01

## 2019-09-05 RX ADMIN — Medication 50 MILLIGRAM(S): at 17:16

## 2019-09-05 RX ADMIN — OXYCODONE AND ACETAMINOPHEN 2 TABLET(S): 5; 325 TABLET ORAL at 22:09

## 2019-09-05 RX ADMIN — OXYCODONE AND ACETAMINOPHEN 2 TABLET(S): 5; 325 TABLET ORAL at 14:00

## 2019-09-05 RX ADMIN — Medication 4: at 11:45

## 2019-09-05 RX ADMIN — Medication 6 UNIT(S): at 11:45

## 2019-09-05 RX ADMIN — Medication 6 UNIT(S): at 17:15

## 2019-09-05 RX ADMIN — RIVAROXABAN 15 MILLIGRAM(S): KIT at 17:16

## 2019-09-05 RX ADMIN — OXYCODONE AND ACETAMINOPHEN 2 TABLET(S): 5; 325 TABLET ORAL at 13:19

## 2019-09-05 RX ADMIN — TAMSULOSIN HYDROCHLORIDE 0.4 MILLIGRAM(S): 0.4 CAPSULE ORAL at 21:34

## 2019-09-05 RX ADMIN — OXYCODONE AND ACETAMINOPHEN 2 TABLET(S): 5; 325 TABLET ORAL at 21:39
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
## 2019-09-05 NOTE — PROGRESS NOTE ADULT - SUBJECTIVE AND OBJECTIVE BOX
CC: f/u for leukocytosis    Patient reports: increased swelling of left thigh.He also c/o urgency and slow stream.Bladder scan  100cc    REVIEW OF SYSTEMS:  All other review of systems negative (Comprehensive ROS)    Antimicrobials Day #  :    Other Medications Reviewed    T(F): 98.1 (09-05-19 @ 11:25), Max: 100.3 (09-04-19 @ 20:39)  HR: 82 (09-05-19 @ 11:25)  BP: 125/67 (09-05-19 @ 11:25)  RR: 18 (09-05-19 @ 11:25)  SpO2: 98% (09-05-19 @ 11:25)  Wt(kg): --    PHYSICAL EXAM:  General: alert, no acute distress  Eyes:  anicteric, no conjunctival injection, no discharge  Oropharynx: no lesions or injection 	  Neck: supple, without adenopathy  Lungs: clear to auscultation  Heart: irregular rate and rhythm; no murmur, rubs or gallops  Abdomen: soft, nondistended, nontender, without mass or organomegaly  Skin: skin with livido on legs  Extremities: no clubbing, cyanosis,+ edema left thigh with inner area ecchmotic  Neurologic: alert, oriented, moves all extremities    LAB RESULTS:                        10.9   14.84 )-----------( 329      ( 05 Sep 2019 08:41 )             31.8     09-05    128<L>  |  94<L>  |  24<H>  ----------------------------<  210<H>  4.3   |  22  |  1.36<H>    Ca    9.4      05 Sep 2019 06:10  Mg     2.0     09-04    TPro  6.4  /  Alb  3.6  /  TBili  1.7<H>  /  DBili  x   /  AST  36  /  ALT  30  /  AlkPhos  115  09-04    LIVER FUNCTIONS - ( 04 Sep 2019 06:31 )  Alb: 3.6 g/dL / Pro: 6.4 g/dL / ALK PHOS: 115 U/L / ALT: 30 U/L / AST: 36 U/L / GGT: x             MICROBIOLOGY:  RECENT CULTURES:      RADIOLOGY REVIEWED:    < from: US Kidney and Bladder (09.02.19 @ 21:01) >  IMPRESSION:     No hydronephrosis. Small postvoid residual of 16.2 mL.    < end of copied text >

## 2019-09-05 NOTE — PROGRESS NOTE ADULT - SUBJECTIVE AND OBJECTIVE BOX
Patient is a 64y old  Male who presents with a chief complaint of cellulitis (05 Sep 2019 11:21)    he is coughing on and off.  He is concerned because left thigh is more swollen.      Denies fevers, chill, sweats, HA, dizziness, nosebleeds, sore throat, CP< SOB, hemoptysis, N/V/D, abd pain, dysuria, hematuria      Medication:   acetaminophen   Tablet .. 650 milliGRAM(s) Oral every 6 hours PRN  acetaminophen   Tablet .. 650 milliGRAM(s) Oral every 6 hours PRN  amLODIPine   Tablet 5 milliGRAM(s) Oral daily  aspirin enteric coated 81 milliGRAM(s) Oral daily  atorvastatin 80 milliGRAM(s) Oral at bedtime  benzocaine 15 mG/menthol 3.6 mG (Sugar-Free) Lozenge 1 Lozenge Oral four times a day PRN  benzonatate 100 milliGRAM(s) Oral three times a day  dextrose 40% Gel 15 Gram(s) Oral once PRN  dextrose 5%. 1000 milliLiter(s) IV Continuous <Continuous>  dextrose 50% Injectable 12.5 Gram(s) IV Push once  dextrose 50% Injectable 25 Gram(s) IV Push once  dextrose 50% Injectable 25 Gram(s) IV Push once  diltiazem    milliGRAM(s) Oral daily  glucagon  Injectable 1 milliGRAM(s) IntraMuscular once PRN  guaiFENesin   Syrup  (Sugar-Free) 100 milliGRAM(s) Oral every 6 hours PRN  insulin glargine Injectable (LANTUS) 32 Unit(s) SubCutaneous at bedtime  insulin lispro (HumaLOG) corrective regimen sliding scale   SubCutaneous three times a day before meals  insulin lispro (HumaLOG) corrective regimen sliding scale   SubCutaneous at bedtime  insulin lispro Injectable (HumaLOG) 6 Unit(s) SubCutaneous three times a day before meals  metoprolol succinate ER 50 milliGRAM(s) Oral two times a day  oxyCODONE    5 mG/acetaminophen 325 mG 2 Tablet(s) Oral every 6 hours PRN  rivaroxaban 15 milliGRAM(s) Oral with dinner  tamsulosin 0.4 milliGRAM(s) Oral at bedtime  traMADol 25 milliGRAM(s) Oral every 6 hours PRN      Physical exam    T(C): 36.7 (09-05-19 @ 11:25), Max: 37.9 (09-04-19 @ 20:39)  HR: 82 (09-05-19 @ 11:25) (82 - 114)  BP: 125/67 (09-05-19 @ 11:25) (125/67 - 152/80)  RR: 18 (09-05-19 @ 11:25) (18 - 18)  SpO2: 98% (09-05-19 @ 11:25) (97% - 98%)  Wt(kg): --    alert NAD  EOMI anicteric sclera  Neck Supple No LNA  Cv s1 S2 RRR  Lungs clear B/L  abd soft NT ND +BS  No RLE edema or tenderness, positive left thigh swelling    Labs                        10.9   14.84 )-----------( 329      ( 05 Sep 2019 08:41 )             31.8       09-05    128<L>  |  94<L>  |  24<H>  ----------------------------<  210<H>  4.3   |  22  |  1.36<H>    Ca    9.4      05 Sep 2019 06:10  Mg     2.0     09-04    TPro  6.4  /  Alb  3.6  /  TBili  1.7<H>  /  DBili  x   /  AST  36  /  ALT  30  /  AlkPhos  115  09-04      LIVER FUNCTIONS - ( 04 Sep 2019 06:31 )  Alb: 3.6 g/dL / Pro: 6.4 g/dL / ALK PHOS: 115 U/L / ALT: 30 U/L / AST: 36 U/L / GGT: x             iron   16  TIBC   272  ferritin  313      B12     646    folate     14.6    LDH  183    haptoglobin     369    [id number]
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
## 2019-09-05 NOTE — PROGRESS NOTE ADULT - ASSESSMENT
63 yo M, DM, CAD, Afib, here briefly for cough, then ED 8/26 for R ankle pain, swelling, redness, given Keflex.    Some improvement, but then low grade fever and returned 8/29    Afebrile here, WBC on admission minimally elevated.  R ankle with resolved  edema; no erythema.    Presentation atypical for cellulitis, no portal of entry.  Prompt resolution of erythema.  Prominent pain, would be more concerned abt crystal dz, ?primary arthritis, ?gout vs pseudogout, ?induced by diuretic use  Still no erythema, edema improved, pain resolved  L thigh ecchymosis, likely hematoma, on anticoagulation, no signs of secondary infection.  Rapid increase in wbc noted, puzzling, no other support for infection.  Awaiting ablation for A Fib  ? stress leukocytosis   Ultrasound of left leg without any defined collection  Cephalexin stopped 9/4  Suspect low grade temp is inflammatory to left thigh process  Plan: observe off antibiotics  Follow left leg exam  Follow wbc and diff's.  Consider CT of left thigh

## 2019-09-05 NOTE — PROGRESS NOTE ADULT - ASSESSMENT
Assessment /PLAN:  DMT2: 64y Male with DM T2 with hyperglycemia, A1C 9% , blood sugars trending within acceptable range, no hypoglycemia. States that the swelling by his leg hematoma remains, otherwise no complaints, eating, comfortable.  Cellulitis Left leg: On antibiotics, stable. Has pain and edema of the legs, positive hematoma. Being worked up, ID FU   CAD: On medications, no chest pain, stable, monitored  HTN: Controlled,  on antihypertensive medications. Assessment /PLAN:  DMT2: 64y Male with DM T2 with hyperglycemia, A1C 9% , blood sugars trending within acceptable range, no hypoglycemia. States that the swelling by his leg  hematoma remains, otherwise no complaints, eating, comfortable.  Cellulitis Left leg: On antibiotics, stable. Has pain and edema of the legs, positive hematoma. Being worked up, ID FU   CAD: On medications, no chest pain, stable, monitored  HTN: Controlled,  on antihypertensive medications.

## 2019-09-05 NOTE — PROGRESS NOTE ADULT - ASSESSMENT
64 year old male with a history of CAD S/P multiple stents, chronic AF  presents with fever, erythema swelling and pain of the right foot.  He also has HTN, DM, peripheral neuropathy.  The degree of erythema and swelling is much improved.   In the office it was quite red over the medial malleolus. Left thigh erythema present ? fascial plane hemorrhage from severe cramp. Given h/o spontaneous hematomas - this one being smaller and CHADS VASC 3  - would retain AC with Xarelto.   - cont ABX  - cont Xarelto for thromboembolism prophylaxis. Has persistent af  - Was on Brillinta - changed to ASA to reduce overall antiplatelet anticoagulant burden. Had stents > 1 yr ago   - cont metoprolol  - Appreciate consultation from Dr Barnett - EPS - YVES demonstrated spontaneous echo contrast - "smoke" so no DCCV performed   - Left thigh pain likely secondary to hematoma.  Patient having spontaneous bleeding.  Hematology consult, consider a Watchman to avoid A/C in the future. consider vascular consult for opinion regarding hematoma and management  Discussed with Dr. Mcdaniel  Discussed with EPS NP

## 2019-09-05 NOTE — PROGRESS NOTE ADULT - SUBJECTIVE AND OBJECTIVE BOX
24H hour events: No over night events. Denies c/o CP, palpitations, dizziness, CP or SOB.     MEDICATIONS:  amLODIPine   Tablet 5 milliGRAM(s) Oral daily  aspirin enteric coated 81 milliGRAM(s) Oral daily  diltiazem    milliGRAM(s) Oral daily  metoprolol succinate ER 50 milliGRAM(s) Oral two times a day  rivaroxaban 15 milliGRAM(s) Oral with dinner    benzonatate 100 milliGRAM(s) Oral three times a day  guaiFENesin   Syrup  (Sugar-Free) 100 milliGRAM(s) Oral every 6 hours PRN    acetaminophen   Tablet .. 650 milliGRAM(s) Oral every 6 hours PRN  acetaminophen   Tablet .. 650 milliGRAM(s) Oral every 6 hours PRN  oxyCODONE    5 mG/acetaminophen 325 mG 2 Tablet(s) Oral every 6 hours PRN  traMADol 25 milliGRAM(s) Oral every 6 hours PRN      atorvastatin 80 milliGRAM(s) Oral at bedtime  dextrose 40% Gel 15 Gram(s) Oral once PRN  dextrose 50% Injectable 12.5 Gram(s) IV Push once  dextrose 50% Injectable 25 Gram(s) IV Push once  dextrose 50% Injectable 25 Gram(s) IV Push once  glucagon  Injectable 1 milliGRAM(s) IntraMuscular once PRN  insulin glargine Injectable (LANTUS) 32 Unit(s) SubCutaneous at bedtime  insulin lispro (HumaLOG) corrective regimen sliding scale   SubCutaneous three times a day before meals  insulin lispro (HumaLOG) corrective regimen sliding scale   SubCutaneous at bedtime  insulin lispro Injectable (HumaLOG) 6 Unit(s) SubCutaneous three times a day before meals    benzocaine 15 mG/menthol 3.6 mG (Sugar-Free) Lozenge 1 Lozenge Oral four times a day PRN  dextrose 5%. 1000 milliLiter(s) IV Continuous <Continuous>      REVIEW OF SYSTEMS:  Complete 10point ROS negative.    PHYSICAL EXAM:  T(C): 37.5 (09-05-19 @ 09:19), Max: 37.9 (09-04-19 @ 20:39)  HR: 103 (09-05-19 @ 04:50) (103 - 114)  BP: 152/80 (09-05-19 @ 04:50) (125/71 - 152/80)  RR: 18 (09-05-19 @ 04:50) (18 - 18)  SpO2: 97% (09-05-19 @ 04:50) (97% - 97%)    04 Sep 2019 07:01  -  05 Sep 2019 07:00  --------------------------------------------------------  IN: 460 mL / OUT: 250 mL / NET: 210 mL    05 Sep 2019 07:01  -  05 Sep 2019 10:54  --------------------------------------------------------  IN: 240 mL / OUT: 0 mL / NET: 240 mL    Appearance: Normal	     Cardiovascular: Normal S1 S2, No JVD, No murmurs, No edema  Respiratory: Lungs clear to auscultation	  Psychiatry: A & O x 3, Mood & affect appropriate  Gastrointestinal:  Soft, Non-tender, + BS	  Skin: No rashes, No ecchymoses, No cyanosis	  Neurologic: Non-focal  Extremities: Normal range of motion, No clubbing, cyanosis or edema  Vascular: Peripheral pulses palpable 2+ bilaterally        LABS:	 	    CBC Full  -  ( 05 Sep 2019 08:41 )  WBC Count : 14.84 K/uL  Hemoglobin : 10.9 g/dL  Hematocrit : 31.8 %  Platelet Count - Automated : 329 K/uL  Mean Cell Volume : 88.1 fl  Mean Cell Hemoglobin : 30.2 pg  Mean Cell Hemoglobin Concentration : 34.3 gm/dL  Auto Neutrophil # : x  Auto Lymphocyte # : x  Auto Monocyte # : x  Auto Eosinophil # : x  Auto Basophil # : x  Auto Neutrophil % : x  Auto Lymphocyte % : x  Auto Monocyte % : x  Auto Eosinophil % : x  Auto Basophil % : x    09-05    128<L>  |  94<L>  |  24<H>  ----------------------------<  210<H>  4.3   |  22  |  1.36<H>  09-04    132<L>  |  96  |  23  ----------------------------<  206<H>  4.4   |  23  |  1.32<H>    Ca    9.4      05 Sep 2019 06:10  Ca    9.6      04 Sep 2019 06:31  Mg     2.0     09-04    TPro  6.4  /  Alb  3.6  /  TBili  1.7<H>  /  DBili  x   /  AST  36  /  ALT  30  /  AlkPhos  115  09-04      TELEMETRY: 	AFib 80-90, up to 110 24H hour events: No over night events. Denies c/o CP, palpitations, dizziness, CP or SOB.     MEDICATIONS:  amLODIPine   Tablet 5 milliGRAM(s) Oral daily  aspirin enteric coated 81 milliGRAM(s) Oral daily  diltiazem    milliGRAM(s) Oral daily  metoprolol succinate ER 50 milliGRAM(s) Oral two times a day  rivaroxaban 15 milliGRAM(s) Oral with dinner    benzonatate 100 milliGRAM(s) Oral three times a day  guaiFENesin   Syrup  (Sugar-Free) 100 milliGRAM(s) Oral every 6 hours PRN    acetaminophen   Tablet .. 650 milliGRAM(s) Oral every 6 hours PRN  acetaminophen   Tablet .. 650 milliGRAM(s) Oral every 6 hours PRN  oxyCODONE    5 mG/acetaminophen 325 mG 2 Tablet(s) Oral every 6 hours PRN  traMADol 25 milliGRAM(s) Oral every 6 hours PRN      atorvastatin 80 milliGRAM(s) Oral at bedtime  dextrose 40% Gel 15 Gram(s) Oral once PRN  dextrose 50% Injectable 12.5 Gram(s) IV Push once  dextrose 50% Injectable 25 Gram(s) IV Push once  dextrose 50% Injectable 25 Gram(s) IV Push once  glucagon  Injectable 1 milliGRAM(s) IntraMuscular once PRN  insulin glargine Injectable (LANTUS) 32 Unit(s) SubCutaneous at bedtime  insulin lispro (HumaLOG) corrective regimen sliding scale   SubCutaneous three times a day before meals  insulin lispro (HumaLOG) corrective regimen sliding scale   SubCutaneous at bedtime  insulin lispro Injectable (HumaLOG) 6 Unit(s) SubCutaneous three times a day before meals    benzocaine 15 mG/menthol 3.6 mG (Sugar-Free) Lozenge 1 Lozenge Oral four times a day PRN  dextrose 5%. 1000 milliLiter(s) IV Continuous <Continuous>      REVIEW OF SYSTEMS:  Complete 10point ROS negative.    PHYSICAL EXAM:  T(C): 37.5 (09-05-19 @ 09:19), Max: 37.9 (09-04-19 @ 20:39)  HR: 103 (09-05-19 @ 04:50) (103 - 114)  BP: 152/80 (09-05-19 @ 04:50) (125/71 - 152/80)  RR: 18 (09-05-19 @ 04:50) (18 - 18)  SpO2: 97% (09-05-19 @ 04:50) (97% - 97%)    04 Sep 2019 07:01  -  05 Sep 2019 07:00  --------------------------------------------------------  IN: 460 mL / OUT: 250 mL / NET: 210 mL    05 Sep 2019 07:01  -  05 Sep 2019 10:54  --------------------------------------------------------  IN: 240 mL / OUT: 0 mL / NET: 240 mL    Appearance: Normal	     Cardiovascular: Normal S1 S2, No JVD, No murmurs, No edema  Respiratory: Lungs clear to auscultation	  Psychiatry: A & O x 3, Mood & affect appropriate  Gastrointestinal:  Soft, Non-tender, + BS	  Skin: Left thigh ecchymosis and swelling   Extremities: Normal range of motion, No clubbing, cyanosis or edema  Vascular: Peripheral pulses palpable 2+ bilaterally      LABS:	 	    CBC Full  -  ( 05 Sep 2019 08:41 )  WBC Count : 14.84 K/uL  Hemoglobin : 10.9 g/dL  Hematocrit : 31.8 %  Platelet Count - Automated : 329 K/uL  Mean Cell Volume : 88.1 fl  Mean Cell Hemoglobin : 30.2 pg  Mean Cell Hemoglobin Concentration : 34.3 gm/dL  Auto Neutrophil # : x  Auto Lymphocyte # : x  Auto Monocyte # : x  Auto Eosinophil # : x  Auto Basophil # : x  Auto Neutrophil % : x  Auto Lymphocyte % : x  Auto Monocyte % : x  Auto Eosinophil % : x  Auto Basophil % : x    09-05    128<L>  |  94<L>  |  24<H>  ----------------------------<  210<H>  4.3   |  22  |  1.36<H>  09-04    132<L>  |  96  |  23  ----------------------------<  206<H>  4.4   |  23  |  1.32<H>    Ca    9.4      05 Sep 2019 06:10  Ca    9.6      04 Sep 2019 06:31  Mg     2.0     09-04    TPro  6.4  /  Alb  3.6  /  TBili  1.7<H>  /  DBili  x   /  AST  36  /  ALT  30  /  AlkPhos  115  09-04      TELEMETRY: 	AFib 80-90, up to 110

## 2019-09-05 NOTE — PROGRESS NOTE ADULT - PROBLEM SELECTOR PLAN 1
Will continue current insulin regimen for now. Will continue monitoring FS, log, and follow up.  Patient counseled for compliance with consistent low carb diet.
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
## 2019-09-05 NOTE — PROGRESS NOTE ADULT - SUBJECTIVE AND OBJECTIVE BOX
Chief complaint  Patient is a 64y old  Male who presents with a chief complaint of cellulitis (05 Sep 2019 11:21)   Review of systems  Patient in bed, looks comfortable, no fever, no hypoglycemia.    Labs and Fingersticks  CAPILLARY BLOOD GLUCOSE      POCT Blood Glucose.: 207 mg/dL (05 Sep 2019 11:24)  POCT Blood Glucose.: 194 mg/dL (05 Sep 2019 07:42)  POCT Blood Glucose.: 267 mg/dL (04 Sep 2019 21:24)  POCT Blood Glucose.: 134 mg/dL (04 Sep 2019 16:49)      Anion Gap, Serum: 12 (09-05 @ 06:10)  Anion Gap, Serum: 13 (09-04 @ 06:31)      Calcium, Total Serum: 9.4 (09-05 @ 06:10)  Calcium, Total Serum: 9.6 (09-04 @ 06:31)  Albumin, Serum: 3.6 (09-04 @ 06:31)    Alanine Aminotransferase (ALT/SGPT): 30 (09-04 @ 06:31)  Alkaline Phosphatase, Serum: 115 (09-04 @ 06:31)  Aspartate Aminotransferase (AST/SGOT): 36 (09-04 @ 06:31)        09-05    128<L>  |  94<L>  |  24<H>  ----------------------------<  210<H>  4.3   |  22  |  1.36<H>    Ca    9.4      05 Sep 2019 06:10  Mg     2.0     09-04    TPro  6.4  /  Alb  3.6  /  TBili  1.7<H>  /  DBili  x   /  AST  36  /  ALT  30  /  AlkPhos  115  09-04                        10.9   14.84 )-----------( 329      ( 05 Sep 2019 08:41 )             31.8     Medications  MEDICATIONS  (STANDING):  amLODIPine   Tablet 5 milliGRAM(s) Oral daily  aspirin enteric coated 81 milliGRAM(s) Oral daily  atorvastatin 80 milliGRAM(s) Oral at bedtime  benzonatate 100 milliGRAM(s) Oral three times a day  dextrose 5%. 1000 milliLiter(s) (50 mL/Hr) IV Continuous <Continuous>  dextrose 50% Injectable 12.5 Gram(s) IV Push once  dextrose 50% Injectable 25 Gram(s) IV Push once  dextrose 50% Injectable 25 Gram(s) IV Push once  diltiazem    milliGRAM(s) Oral daily  insulin glargine Injectable (LANTUS) 32 Unit(s) SubCutaneous at bedtime  insulin lispro (HumaLOG) corrective regimen sliding scale   SubCutaneous three times a day before meals  insulin lispro (HumaLOG) corrective regimen sliding scale   SubCutaneous at bedtime  insulin lispro Injectable (HumaLOG) 6 Unit(s) SubCutaneous three times a day before meals  metoprolol succinate ER 50 milliGRAM(s) Oral two times a day  rivaroxaban 15 milliGRAM(s) Oral with dinner      Physical Exam  General: Patient comfortable in bed  Vital Signs Last 12 Hrs  T(F): 98.1 (09-05-19 @ 11:25), Max: 99.5 (09-05-19 @ 04:50)  HR: 82 (09-05-19 @ 11:25) (82 - 103)  BP: 125/67 (09-05-19 @ 11:25) (125/67 - 152/80)  BP(mean): --  RR: 18 (09-05-19 @ 11:25) (18 - 18)  SpO2: 98% (09-05-19 @ 11:25) (97% - 98%)  Neck: No palpable thyroid nodules.  CVS: S1S2, No murmurs  Respiratory: No wheezing, no crepitations  GI: Abdomen soft, bowel sounds positive  Musculoskeletal:  edema lower extremities.   Skin: No skin rashes, no ecchymosis    Diagnostics Chief complaint  Patient is a 64y old  Male who presents with a chief complaint of cellulitis (05 Sep 2019 11:21)   Review of systems  Patient in bed, looks comfortable, no fever,  no hypoglycemia.    Labs and Fingersticks  CAPILLARY BLOOD GLUCOSE      POCT Blood Glucose.: 207 mg/dL (05 Sep 2019 11:24)  POCT Blood Glucose.: 194 mg/dL (05 Sep 2019 07:42)  POCT Blood Glucose.: 267 mg/dL (04 Sep 2019 21:24)  POCT Blood Glucose.: 134 mg/dL (04 Sep 2019 16:49)      Anion Gap, Serum: 12 (09-05 @ 06:10)  Anion Gap, Serum: 13 (09-04 @ 06:31)      Calcium, Total Serum: 9.4 (09-05 @ 06:10)  Calcium, Total Serum: 9.6 (09-04 @ 06:31)  Albumin, Serum: 3.6 (09-04 @ 06:31)    Alanine Aminotransferase (ALT/SGPT): 30 (09-04 @ 06:31)  Alkaline Phosphatase, Serum: 115 (09-04 @ 06:31)  Aspartate Aminotransferase (AST/SGOT): 36 (09-04 @ 06:31)        09-05    128<L>  |  94<L>  |  24<H>  ----------------------------<  210<H>  4.3   |  22  |  1.36<H>    Ca    9.4      05 Sep 2019 06:10  Mg     2.0     09-04    TPro  6.4  /  Alb  3.6  /  TBili  1.7<H>  /  DBili  x   /  AST  36  /  ALT  30  /  AlkPhos  115  09-04                        10.9   14.84 )-----------( 329      ( 05 Sep 2019 08:41 )             31.8     Medications  MEDICATIONS  (STANDING):  amLODIPine   Tablet 5 milliGRAM(s) Oral daily  aspirin enteric coated 81 milliGRAM(s) Oral daily  atorvastatin 80 milliGRAM(s) Oral at bedtime  benzonatate 100 milliGRAM(s) Oral three times a day  dextrose 5%. 1000 milliLiter(s) (50 mL/Hr) IV Continuous <Continuous>  dextrose 50% Injectable 12.5 Gram(s) IV Push once  dextrose 50% Injectable 25 Gram(s) IV Push once  dextrose 50% Injectable 25 Gram(s) IV Push once  diltiazem    milliGRAM(s) Oral daily  insulin glargine Injectable (LANTUS) 32 Unit(s) SubCutaneous at bedtime  insulin lispro (HumaLOG) corrective regimen sliding scale   SubCutaneous three times a day before meals  insulin lispro (HumaLOG) corrective regimen sliding scale   SubCutaneous at bedtime  insulin lispro Injectable (HumaLOG) 6 Unit(s) SubCutaneous three times a day before meals  metoprolol succinate ER 50 milliGRAM(s) Oral two times a day  rivaroxaban 15 milliGRAM(s) Oral with dinner      Physical Exam  General: Patient comfortable in bed  Vital Signs Last 12 Hrs  T(F): 98.1 (09-05-19 @ 11:25), Max: 99.5 (09-05-19 @ 04:50)  HR: 82 (09-05-19 @ 11:25) (82 - 103)  BP: 125/67 (09-05-19 @ 11:25) (125/67 - 152/80)  BP(mean): --  RR: 18 (09-05-19 @ 11:25) (18 - 18)  SpO2: 98% (09-05-19 @ 11:25) (97% - 98%)  Neck: No palpable thyroid nodules.  CVS: S1S2, No murmurs  Respiratory: No wheezing, no crepitations  GI: Abdomen soft, bowel sounds positive  Musculoskeletal:  edema lower extremities.   Skin: No skin rashes, no ecchymosis    Diagnostics

## 2019-09-05 NOTE — PROGRESS NOTE ADULT - SUBJECTIVE AND OBJECTIVE BOX
Patient is a 64y old  Male who presents with a chief complaint of RLE pain (05 Sep 2019 14:04)                                                               INTERVAL HPI/OVERNIGHT EVENTS:    REVIEW OF SYSTEMS:     CONSTITUTIONAL: No weakness, fevers or chills  RESPIRATORY: No cough, wheezing,  No shortness of breath  CARDIOVASCULAR: No chest pain or palpitations  GASTROINTESTINAL: No abdominal pain  . No nausea, vomiting, or hematemesis; No diarrhea or constipation. No melena or hematochezia.  GENITOURINARY: No dysuria, frequency or hematuria  NEUROLOGICAL: No numbness or weakness  SKIN: No itching, burning, rashes, or lesions                    MSK "  L thigh swelling /wecchymosis                                                                                                                                                                                                                                                                Medications:  MEDICATIONS  (STANDING):  amLODIPine   Tablet 5 milliGRAM(s) Oral daily  aspirin enteric coated 81 milliGRAM(s) Oral daily  atorvastatin 80 milliGRAM(s) Oral at bedtime  benzonatate 100 milliGRAM(s) Oral three times a day  dextrose 5%. 1000 milliLiter(s) (50 mL/Hr) IV Continuous <Continuous>  dextrose 50% Injectable 12.5 Gram(s) IV Push once  dextrose 50% Injectable 25 Gram(s) IV Push once  dextrose 50% Injectable 25 Gram(s) IV Push once  diltiazem    milliGRAM(s) Oral daily  insulin glargine Injectable (LANTUS) 32 Unit(s) SubCutaneous at bedtime  insulin lispro (HumaLOG) corrective regimen sliding scale   SubCutaneous three times a day before meals  insulin lispro (HumaLOG) corrective regimen sliding scale   SubCutaneous at bedtime  insulin lispro Injectable (HumaLOG) 6 Unit(s) SubCutaneous three times a day before meals  metoprolol succinate ER 50 milliGRAM(s) Oral two times a day  rivaroxaban 15 milliGRAM(s) Oral with dinner  tamsulosin 0.4 milliGRAM(s) Oral at bedtime    MEDICATIONS  (PRN):  acetaminophen   Tablet .. 650 milliGRAM(s) Oral every 6 hours PRN Temp greater or equal to 38C (100.4F)  acetaminophen   Tablet .. 650 milliGRAM(s) Oral every 6 hours PRN Mild Pain (1 - 3)  benzocaine 15 mG/menthol 3.6 mG (Sugar-Free) Lozenge 1 Lozenge Oral four times a day PRN Sore Throat  dextrose 40% Gel 15 Gram(s) Oral once PRN Blood Glucose LESS THAN 70 milliGRAM(s)/deciliter  glucagon  Injectable 1 milliGRAM(s) IntraMuscular once PRN Glucose LESS THAN 70 milligrams/deciliter  guaiFENesin   Syrup  (Sugar-Free) 100 milliGRAM(s) Oral every 6 hours PRN Cough  oxyCODONE    5 mG/acetaminophen 325 mG 2 Tablet(s) Oral every 6 hours PRN Severe Pain (7 - 10)  traMADol 25 milliGRAM(s) Oral every 6 hours PRN Moderate Pain (4 - 6)       Allergies    No Known Allergies    Intolerances      Vital Signs Last 24 Hrs  T(C): 37.2 (05 Sep 2019 17:12), Max: 37.9 (04 Sep 2019 20:39)  T(F): 98.9 (05 Sep 2019 17:12), Max: 100.3 (04 Sep 2019 20:39)  HR: 103 (05 Sep 2019 17:12) (82 - 114)  BP: 133/65 (05 Sep 2019 17:12) (125/67 - 152/80)  BP(mean): --  RR: 18 (05 Sep 2019 17:12) (18 - 18)  SpO2: 98% (05 Sep 2019 17:12) (97% - 98%)  CAPILLARY BLOOD GLUCOSE      POCT Blood Glucose.: 85 mg/dL (05 Sep 2019 16:36)  POCT Blood Glucose.: 207 mg/dL (05 Sep 2019 11:24)  POCT Blood Glucose.: 194 mg/dL (05 Sep 2019 07:42)  POCT Blood Glucose.: 267 mg/dL (04 Sep 2019 21:24)      09-04 @ 07:01  -  09-05 @ 07:00  --------------------------------------------------------  IN: 460 mL / OUT: 250 mL / NET: 210 mL    09-05 @ 07:01  -  09-05 @ 18:06  --------------------------------------------------------  IN: 480 mL / OUT: 0 mL / NET: 480 mL      Physical Exam:    General:  Well appearing, NAD, not cachetic  HEENT:  Nonicteric, PERRLA  CV:  RRR, S1S2   Lungs:  CTA B/L, no wheezes, rales, rhonchi  Abdomen:  Soft, non-tender, no distended, positive BS  Extremities:  2+ pulses, no c/c, no edema  Skin:  Warm and dry, no rashes  :  No moreno  Neuro:  AAOx3, non-focal, grossly intact              L thich eccyhmosis / swelling  reticulation of skin on R                                                                                                                                                                                                                                                                                     LABS:                               10.9   14.84 )-----------( 329      ( 05 Sep 2019 08:41 )             31.8                      09-05    128<L>  |  94<L>  |  24<H>  ----------------------------<  210<H>  4.3   |  22  |  1.36<H>    Ca    9.4      05 Sep 2019 06:10  Mg     2.0     09-04    TPro  6.4  /  Alb  3.6  /  TBili  1.7<H>  /  DBili  x   /  AST  36  /  ALT  30  /  AlkPhos  115  09-04

## 2019-09-05 NOTE — PROGRESS NOTE ADULT - ASSESSMENT
patient with low grade fevers recently treated with antibiotics for cellulitis.  he has left thigh swelling and he will be having a CT to assess further.  management per primary team.    anemia - AOCD with kidney insufficiency and infection.  Hgb adequate and can monitor.  Work-up in progress and no hemolysis, nutritional deficiency and iron studies are c.w AOCD.  monitor the Hgb
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
## 2019-09-06 ENCOUNTER — TRANSCRIPTION ENCOUNTER (OUTPATIENT)
Age: 64
End: 2019-09-06

## 2019-09-06 LAB
ANION GAP SERPL CALC-SCNC: 11 MMOL/L — SIGNIFICANT CHANGE UP (ref 5–17)
ANION GAP SERPL CALC-SCNC: 12 MMOL/L — SIGNIFICANT CHANGE UP (ref 5–17)
BASOPHILS # BLD AUTO: 0.1 K/UL — SIGNIFICANT CHANGE UP (ref 0–0.2)
BASOPHILS NFR BLD AUTO: 0.5 % — SIGNIFICANT CHANGE UP (ref 0–2)
BUN SERPL-MCNC: 28 MG/DL — HIGH (ref 7–23)
BUN SERPL-MCNC: 34 MG/DL — HIGH (ref 7–23)
CALCIUM SERPL-MCNC: 9.3 MG/DL — SIGNIFICANT CHANGE UP (ref 8.4–10.5)
CALCIUM SERPL-MCNC: 9.3 MG/DL — SIGNIFICANT CHANGE UP (ref 8.4–10.5)
CHLORIDE SERPL-SCNC: 90 MMOL/L — LOW (ref 96–108)
CHLORIDE SERPL-SCNC: 93 MMOL/L — LOW (ref 96–108)
CO2 SERPL-SCNC: 22 MMOL/L — SIGNIFICANT CHANGE UP (ref 22–31)
CO2 SERPL-SCNC: 23 MMOL/L — SIGNIFICANT CHANGE UP (ref 22–31)
CREAT ?TM UR-MCNC: 92 MG/DL — SIGNIFICANT CHANGE UP
CREAT SERPL-MCNC: 1.58 MG/DL — HIGH (ref 0.5–1.3)
CREAT SERPL-MCNC: 1.67 MG/DL — HIGH (ref 0.5–1.3)
EOSINOPHIL # BLD AUTO: 0.3 K/UL — SIGNIFICANT CHANGE UP (ref 0–0.5)
EOSINOPHIL NFR BLD AUTO: 2.2 % — SIGNIFICANT CHANGE UP (ref 0–6)
GLUCOSE BLDC GLUCOMTR-MCNC: 130 MG/DL — HIGH (ref 70–99)
GLUCOSE BLDC GLUCOMTR-MCNC: 169 MG/DL — HIGH (ref 70–99)
GLUCOSE BLDC GLUCOMTR-MCNC: 195 MG/DL — HIGH (ref 70–99)
GLUCOSE BLDC GLUCOMTR-MCNC: 223 MG/DL — HIGH (ref 70–99)
GLUCOSE BLDC GLUCOMTR-MCNC: 410 MG/DL — HIGH (ref 70–99)
GLUCOSE BLDC GLUCOMTR-MCNC: 419 MG/DL — HIGH (ref 70–99)
GLUCOSE SERPL-MCNC: 236 MG/DL — HIGH (ref 70–99)
GLUCOSE SERPL-MCNC: 441 MG/DL — HIGH (ref 70–99)
HCT VFR BLD CALC: 32.7 % — LOW (ref 39–50)
HGB BLD-MCNC: 10.4 G/DL — LOW (ref 13–17)
INTERPRETATION SERPL IFE-IMP: SIGNIFICANT CHANGE UP
LYMPHOCYTES # BLD AUTO: 1.6 K/UL — SIGNIFICANT CHANGE UP (ref 1–3.3)
LYMPHOCYTES # BLD AUTO: 11 % — LOW (ref 13–44)
MAGNESIUM SERPL-MCNC: 2.2 MG/DL — SIGNIFICANT CHANGE UP (ref 1.6–2.6)
MCHC RBC-ENTMCNC: 28.8 PG — SIGNIFICANT CHANGE UP (ref 27–34)
MCHC RBC-ENTMCNC: 31.7 GM/DL — LOW (ref 32–36)
MCV RBC AUTO: 90.8 FL — SIGNIFICANT CHANGE UP (ref 80–100)
MONOCYTES # BLD AUTO: 1.5 K/UL — HIGH (ref 0–0.9)
MONOCYTES NFR BLD AUTO: 10 % — SIGNIFICANT CHANGE UP (ref 2–14)
NEUTROPHILS # BLD AUTO: 11.2 K/UL — HIGH (ref 1.8–7.4)
NEUTROPHILS NFR BLD AUTO: 76.3 % — SIGNIFICANT CHANGE UP (ref 43–77)
PLATELET # BLD AUTO: 352 K/UL — SIGNIFICANT CHANGE UP (ref 150–400)
POTASSIUM SERPL-MCNC: 4.5 MMOL/L — SIGNIFICANT CHANGE UP (ref 3.5–5.3)
POTASSIUM SERPL-MCNC: 4.8 MMOL/L — SIGNIFICANT CHANGE UP (ref 3.5–5.3)
POTASSIUM SERPL-SCNC: 4.5 MMOL/L — SIGNIFICANT CHANGE UP (ref 3.5–5.3)
POTASSIUM SERPL-SCNC: 4.8 MMOL/L — SIGNIFICANT CHANGE UP (ref 3.5–5.3)
RBC # BLD: 3.6 M/UL — LOW (ref 4.2–5.8)
RBC # FLD: 11.9 % — SIGNIFICANT CHANGE UP (ref 10.3–14.5)
SODIUM SERPL-SCNC: 124 MMOL/L — LOW (ref 135–145)
SODIUM SERPL-SCNC: 127 MMOL/L — LOW (ref 135–145)
SODIUM UR-SCNC: <20 MMOL/L — SIGNIFICANT CHANGE UP
WBC # BLD: 14.7 K/UL — HIGH (ref 3.8–10.5)
WBC # FLD AUTO: 14.7 K/UL — HIGH (ref 3.8–10.5)

## 2019-09-06 RX ORDER — INSULIN LISPRO 100/ML
8 VIAL (ML) SUBCUTANEOUS
Refills: 0 | Status: DISCONTINUED | OUTPATIENT
Start: 2019-09-06 | End: 2019-09-08

## 2019-09-06 RX ORDER — SODIUM CHLORIDE 9 MG/ML
1000 INJECTION INTRAMUSCULAR; INTRAVENOUS; SUBCUTANEOUS
Refills: 0 | Status: DISCONTINUED | OUTPATIENT
Start: 2019-09-06 | End: 2019-09-09

## 2019-09-06 RX ADMIN — Medication 180 MILLIGRAM(S): at 05:35

## 2019-09-06 RX ADMIN — TAMSULOSIN HYDROCHLORIDE 0.4 MILLIGRAM(S): 0.4 CAPSULE ORAL at 21:41

## 2019-09-06 RX ADMIN — Medication 100 MILLIGRAM(S): at 05:36

## 2019-09-06 RX ADMIN — Medication 12: at 12:24

## 2019-09-06 RX ADMIN — OXYCODONE AND ACETAMINOPHEN 2 TABLET(S): 5; 325 TABLET ORAL at 14:57

## 2019-09-06 RX ADMIN — Medication 8 UNIT(S): at 17:20

## 2019-09-06 RX ADMIN — ATORVASTATIN CALCIUM 80 MILLIGRAM(S): 80 TABLET, FILM COATED ORAL at 21:41

## 2019-09-06 RX ADMIN — Medication 100 MILLIGRAM(S): at 12:23

## 2019-09-06 RX ADMIN — RIVAROXABAN 15 MILLIGRAM(S): KIT at 17:56

## 2019-09-06 RX ADMIN — OXYCODONE AND ACETAMINOPHEN 2 TABLET(S): 5; 325 TABLET ORAL at 04:11

## 2019-09-06 RX ADMIN — OXYCODONE AND ACETAMINOPHEN 2 TABLET(S): 5; 325 TABLET ORAL at 16:00

## 2019-09-06 RX ADMIN — Medication 81 MILLIGRAM(S): at 12:23

## 2019-09-06 RX ADMIN — INSULIN GLARGINE 32 UNIT(S): 100 INJECTION, SOLUTION SUBCUTANEOUS at 22:17

## 2019-09-06 RX ADMIN — OXYCODONE AND ACETAMINOPHEN 2 TABLET(S): 5; 325 TABLET ORAL at 23:07

## 2019-09-06 RX ADMIN — Medication 6 UNIT(S): at 08:20

## 2019-09-06 RX ADMIN — Medication 6 UNIT(S): at 12:24

## 2019-09-06 RX ADMIN — Medication 50 MILLIGRAM(S): at 04:08

## 2019-09-06 RX ADMIN — Medication 50 MILLIGRAM(S): at 17:56

## 2019-09-06 RX ADMIN — AMLODIPINE BESYLATE 5 MILLIGRAM(S): 2.5 TABLET ORAL at 05:35

## 2019-09-06 RX ADMIN — SODIUM CHLORIDE 75 MILLILITER(S): 9 INJECTION INTRAMUSCULAR; INTRAVENOUS; SUBCUTANEOUS at 14:57

## 2019-09-06 RX ADMIN — BENZOCAINE AND MENTHOL 1 LOZENGE: 5; 1 LIQUID ORAL at 21:41

## 2019-09-06 RX ADMIN — Medication 4: at 08:21

## 2019-09-06 NOTE — PROGRESS NOTE ADULT - SUBJECTIVE AND OBJECTIVE BOX
Patient is a 64y old  Male who presents with a chief complaint of AF (06 Sep 2019 10:28)    left thing still swollen      Medication:   acetaminophen   Tablet .. 650 milliGRAM(s) Oral every 6 hours PRN  acetaminophen   Tablet .. 650 milliGRAM(s) Oral every 6 hours PRN  amLODIPine   Tablet 5 milliGRAM(s) Oral daily  aspirin enteric coated 81 milliGRAM(s) Oral daily  atorvastatin 80 milliGRAM(s) Oral at bedtime  benzocaine 15 mG/menthol 3.6 mG (Sugar-Free) Lozenge 1 Lozenge Oral four times a day PRN  benzonatate 100 milliGRAM(s) Oral three times a day  dextrose 40% Gel 15 Gram(s) Oral once PRN  dextrose 5%. 1000 milliLiter(s) IV Continuous <Continuous>  dextrose 50% Injectable 12.5 Gram(s) IV Push once  dextrose 50% Injectable 25 Gram(s) IV Push once  dextrose 50% Injectable 25 Gram(s) IV Push once  diltiazem    milliGRAM(s) Oral daily  glucagon  Injectable 1 milliGRAM(s) IntraMuscular once PRN  guaiFENesin   Syrup  (Sugar-Free) 100 milliGRAM(s) Oral every 6 hours PRN  insulin glargine Injectable (LANTUS) 32 Unit(s) SubCutaneous at bedtime  insulin lispro (HumaLOG) corrective regimen sliding scale   SubCutaneous three times a day before meals  insulin lispro (HumaLOG) corrective regimen sliding scale   SubCutaneous at bedtime  insulin lispro Injectable (HumaLOG) 6 Unit(s) SubCutaneous three times a day before meals  metoprolol succinate ER 50 milliGRAM(s) Oral two times a day  oxyCODONE    5 mG/acetaminophen 325 mG 2 Tablet(s) Oral every 6 hours PRN  rivaroxaban 15 milliGRAM(s) Oral with dinner  tamsulosin 0.4 milliGRAM(s) Oral at bedtime  traMADol 25 milliGRAM(s) Oral every 6 hours PRN      Physical exam    T(C): 36.5 (09-06-19 @ 12:22), Max: 37.7 (09-06-19 @ 03:40)  HR: 99 (09-06-19 @ 12:22) (92 - 106)  BP: 139/63 (09-06-19 @ 12:22) (122/65 - 139/63)  RR: 18 (09-06-19 @ 12:22) (16 - 18)  SpO2: 98% (09-06-19 @ 12:22) (97% - 98%)  Wt(kg): --    alert NAD  EOMI anicteric sclera  CV irregular  resp non labored    Labs                        10.4   14.7  )-----------( 352      ( 06 Sep 2019 05:58 )             32.7       09-06    127<L>  |  93<L>  |  28<H>  ----------------------------<  236<H>  4.5   |  23  |  1.67<H>    Ca    9.3      06 Sep 2019 05:58  Mg     2.2     09-06            [id number]
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
## 2019-09-06 NOTE — CONSULT NOTE ADULT - CONSULT REQUESTED DATE/TIME
05-Sep-2019 11:22
30-Aug-2019 14:08
30-Aug-2019 14:11
06-Sep-2019 13:22
04-Sep-2019
30-Aug-2019 08:17

## 2019-09-06 NOTE — CONSULT NOTE ADULT - CONSULT REASON
High Blood Sugars/DMT2
left leg hematoma (Spontaneous)
possible R foot/ankle cellulitis
left thigh hematoma
CAD
anemia

## 2019-09-06 NOTE — PROGRESS NOTE ADULT - ASSESSMENT
Assessment /PLAN:  DMT2: 64y Male with DM T2 with hyperglycemia, now on basal bolus insulin, blood sugars recently elevated, likely due to high-sugar breakfast (3 boxes of cereal, full banana, coffee with sugar), no hypoglycemia. States that the swelling by his leg hematoma remains, and he's not ready for d/c. Otherwise no complaints, eating, now walking, comfortable.  Cellulitis Left leg: On antibiotics, stable. Has pain and edema of the legs, positive hematoma. Being worked up, ID FU   CAD: On medications, no chest pain, stable, monitored  HTN: Controlled,  on antihypertensive medications. Assessment /PLAN:  DMT2: 64y Male with DM T2 with hyperglycemia, now on basal bolus insulin, blood sugars recently elevated, likely due to high-sugar breakfast  (3 boxes of cereal, full banana, coffee with sugar), no hypoglycemia. States that the swelling by his leg hematoma remains, and he's not ready for d/c. Otherwise no complaints, eating, now walking, comfortable.  Cellulitis Left leg: On antibiotics, stable. Has pain and edema of the legs, positive hematoma. Being worked up, ID FU   CAD: On medications, no chest pain, stable, monitored  HTN: Controlled,  on antihypertensive medications.
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
## 2019-09-06 NOTE — PROGRESS NOTE ADULT - SUBJECTIVE AND OBJECTIVE BOX
Patient seen and examined  + LLE swelling     REVIEW OF SYSTEMS:  As per HPI, otherwise 8 full 10 ROS were unremarkable    MEDICATIONS  (STANDING):  amLODIPine   Tablet 5 milliGRAM(s) Oral daily  aspirin enteric coated 81 milliGRAM(s) Oral daily  atorvastatin 80 milliGRAM(s) Oral at bedtime  benzonatate 100 milliGRAM(s) Oral three times a day  dextrose 5%. 1000 milliLiter(s) (50 mL/Hr) IV Continuous <Continuous>  dextrose 50% Injectable 12.5 Gram(s) IV Push once  dextrose 50% Injectable 25 Gram(s) IV Push once  dextrose 50% Injectable 25 Gram(s) IV Push once  diltiazem    milliGRAM(s) Oral daily  insulin glargine Injectable (LANTUS) 32 Unit(s) SubCutaneous at bedtime  insulin lispro (HumaLOG) corrective regimen sliding scale   SubCutaneous three times a day before meals  insulin lispro (HumaLOG) corrective regimen sliding scale   SubCutaneous at bedtime  insulin lispro Injectable (HumaLOG) 8 Unit(s) SubCutaneous three times a day before meals  metoprolol succinate ER 50 milliGRAM(s) Oral two times a day  rivaroxaban 15 milliGRAM(s) Oral with dinner  sodium chloride 0.9%. 1000 milliLiter(s) (75 mL/Hr) IV Continuous <Continuous>  tamsulosin 0.4 milliGRAM(s) Oral at bedtime      VITAL:  T(C): , Max: 37.7 (19 @ 03:40)  T(F): , Max: 99.9 (19 @ 03:40)  HR: 99 (19 @ 12:22)  BP: 139/63 (19 @ 12:22)  BP(mean): --  RR: 18 (19 @ 12:22)  SpO2: 98% (19 @ 12:22)  Wt(kg): --    I and O's:     @ 07:01  -   @ 07:00  --------------------------------------------------------  IN: 480 mL / OUT: 0 mL / NET: 480 mL     @ 07:01  -   @ 15:06  --------------------------------------------------------  IN: 480 mL / OUT: 0 mL / NET: 480 mL          PHYSICAL EXAM:    Constitutional: NAD  HEENT: PERRLA, EOMI,  MMM  Neck: No LAD, No JVD  Respiratory: CTAB  Cardiovascular: S1 and S2  Gastrointestinal: BS+, soft, NT/ND  Extremities: LLE edema   Neurological: A/O x 3, no focal deficits  : No Earl      LABS:                        10.4   14.7  )-----------( 352      ( 06 Sep 2019 05:58 )             32.7         124<L>  |  90<L>  |  34<H>  ----------------------------<  441<H>  4.8   |  22  |  1.58<H>    Ca    9.3      06 Sep 2019 12:43  Mg     2.2             Urine Studies:  Urinalysis Basic - ( 05 Sep 2019 18:46 )    Color: Yellow / Appearance: Clear / S.025 / pH: x  Gluc: x / Ketone: Negative  / Bili: Negative / Urobili: <2 mg/dL   Blood: x / Protein: 30 mg/dL / Nitrite: Negative   Leuk Esterase: Negative / RBC: 50 /HPF / WBC 3 /HPF   Sq Epi: x / Non Sq Epi: 2 /HPF / Bacteria: Negative            ASSESSMENT: 64M with PMHx of HTN, HLD, T2DM, DM neuropathy, afib, and CAD, 21 a/w RLE cellulitis    (1)Renal - CKD 2-3; minimal proteinuria - early diabetic nephropathy  (2) Mikey DD pre-renal azotemia 2ry to decreased effective circulatory volume vs obstructive uropathy- New UA showing + protein and + RBC      RECOMMENDATIONS:  - NS at 75 cc/hr x 15 hours  - Post void bladder scan  - Check Urine Na and Urine Cr  - Phos level  - CPK level  - Renal dosing of meds to cr cl of 30-35 ml/min    d/w Medicine NP    Ashley Mayes MD  [facility]  [phone]

## 2019-09-06 NOTE — CONSULT NOTE ADULT - ASSESSMENT
patient with a normocytic anemia. He was treated for a cellulitis.  he has mild kidney insufficiency.  Likely is an AOCD process.  Hgb adequate for now and can monitor.  Will order an anemia evaluation to assess further.
63 yo M, DM, CAD, Afib, here briefly for cough, then ED for R ankle pain, swelling, redness, given Keflex.  Some improvement, but then low grade fever and returned last evening.  Afebrile here, WBC minimally elevated.  R ankle with only mild residual edema at present; no erythema.  Presentation somewhat atypical for cellulitis, no portal of entry.  Prompt resolution of erythema.  Prominent pain, would be more concerned abt crystal dz, ?primary arthritis, ?gout vs pseudogout, ?induced by diuretic use.    Plan:  For any ? of cellulitis, continue Cefazolin for now  Anticipate d/c home with completion of same Keflex prescription  Still would be concerned abt ankle arthritis- received Ibuprofen, which likely helped  d/w pt at length- infection vs inflammation, gout, reviewed
Assessment  DMT2: 64y Male with DM T2 with hyperglycemia, A1C 9% , was insulin at home, blood sugars running high, no hypoglycemic episode,  eating meals,  non compliant with low carb diet.  Cellulitis Left leg: On antibiotics, stable.  CAD: on medications, no chest pain, stable, monitored.  HTN: Controlled,  on antihypertensive medications.        Myrna Rossi MD  Cell: [phone]  Office: [phone]
Vascular Surgery fellow addendum  Would recommend holding anticoagulation; however may not be feasible if the risks outweigh the benefits - as to be determined by primary team.  Also would recommend CTA, however given patients elevated Cr, may not be possible at this time

## 2019-09-06 NOTE — PROGRESS NOTE ADULT - SUBJECTIVE AND OBJECTIVE BOX
Leg hematoma is slightly improved  No dyspnea or chest pain  Overnight 6 beat WCT  /65    (AF)  Lungs clear  Irregular rhythm 1-2/6 systolic murmur    WBC 14.7  Hgb 10.4  Hct 32.7 Plt 352K  BUN 28  Crt 1.67    Most recent nuclear stress - Sept 2018  Normal myocardial perfusion  EF 53%    Imp:  AF with WCT vs aberrancy.  He has normal LV function  Thigh hematoma is slowly improving  Therefore would continue Xarelto rather than  switching to Coumadin or IV Heparin given his HIGH risk for an embolic event  As for the WCT would continue Metoprolol 50 mg bid and not pursue an ischemic   evaluation at the present given the normal LV function

## 2019-09-06 NOTE — PROGRESS NOTE ADULT - ASSESSMENT
63 yo M, DM, CAD, Afib, here briefly for cough, then ED 8/26 for R ankle pain, swelling, redness, given Keflex.    Some improvement, but then low grade fever and returned 8/29    Afebrile here, WBC on admission minimally elevated.  R ankle with resolved  edema; no erythema.    Presentation atypical for cellulitis, no portal of entry.  Prompt resolution of erythema.  Prominent pain, would be more concerned abt crystal dz, ?primary arthritis, ?gout vs pseudogout, ?induced by diuretic use  Still no erythema, edema improved, pain resolved  L thigh ecchymosis, likely hematoma, on anticoagulation, no signs of secondary infection.  Rapid increase in wbc noted, puzzling, no other support for infection.  Awaiting ablation for A Fib  ? stress leukocytosis   Ultrasound of left leg without any defined collection  Cephalexin stopped 9/4  Suspect low grade temp is inflammatory to left thigh process  CT confirms a hematoma  Plan:  observe off antibiotics  Follow left leg exam  Follow wbc and diff's.  Anticoagulation per  cardiology

## 2019-09-06 NOTE — PROGRESS NOTE ADULT - SUBJECTIVE AND OBJECTIVE BOX
24H hour events:   Patient sitting up comfortably in bed. Denies chest pain, palpitations, shortness of breath, light headedness.    MEDICATIONS:  amLODIPine   Tablet 5 milliGRAM(s) Oral daily  aspirin enteric coated 81 milliGRAM(s) Oral daily  diltiazem    milliGRAM(s) Oral daily  metoprolol succinate ER 50 milliGRAM(s) Oral two times a day  rivaroxaban 15 milliGRAM(s) Oral with dinner  tamsulosin 0.4 milliGRAM(s) Oral at bedtime  benzonatate 100 milliGRAM(s) Oral three times a day  guaiFENesin   Syrup  (Sugar-Free) 100 milliGRAM(s) Oral every 6 hours PRN  acetaminophen   Tablet .. 650 milliGRAM(s) Oral every 6 hours PRN  acetaminophen   Tablet .. 650 milliGRAM(s) Oral every 6 hours PRN  oxyCODONE    5 mG/acetaminophen 325 mG 2 Tablet(s) Oral every 6 hours PRN  traMADol 25 milliGRAM(s) Oral every 6 hours PRN  atorvastatin 80 milliGRAM(s) Oral at bedtime  dextrose 40% Gel 15 Gram(s) Oral once PRN  dextrose 50% Injectable 12.5 Gram(s) IV Push once  dextrose 50% Injectable 25 Gram(s) IV Push once  dextrose 50% Injectable 25 Gram(s) IV Push once  glucagon  Injectable 1 milliGRAM(s) IntraMuscular once PRN  insulin glargine Injectable (LANTUS) 32 Unit(s) SubCutaneous at bedtime  insulin lispro (HumaLOG) corrective regimen sliding scale   SubCutaneous three times a day before meals  insulin lispro (HumaLOG) corrective regimen sliding scale   SubCutaneous at bedtime  insulin lispro Injectable (HumaLOG) 6 Unit(s) SubCutaneous three times a day before meals  benzocaine 15 mG/menthol 3.6 mG (Sugar-Free) Lozenge 1 Lozenge Oral four times a day PRN  dextrose 5%. 1000 milliLiter(s) IV Continuous <Continuous>      REVIEW OF SYSTEMS:  Complete 10point ROS negative.    PHYSICAL EXAM:  T(C): 37.6 (09-06-19 @ 04:32), Max: 37.7 (09-06-19 @ 03:40)  HR: 106 (09-06-19 @ 05:34) (82 - 106)  BP: 136/65 (09-06-19 @ 05:34) (122/65 - 137/72)  RR: 18 (09-06-19 @ 04:32) (16 - 18)  SpO2: 98% (09-06-19 @ 04:32) (97% - 98%)  Wt(kg): --  I&O's Summary    05 Sep 2019 07:01  -  06 Sep 2019 07:00  --------------------------------------------------------  IN: 480 mL / OUT: 0 mL / NET: 480 mL        Appearance: Normal		  Cardiovascular: Normal S1 S2, No JVD, No murmurs, No edema  Respiratory: Lungs clear to auscultation	  Psychiatry: A & O x 3, Mood & affect appropriate  Gastrointestinal:  Soft, Non-tender, + BS	  Skin: No rashes, No ecchymoses, No cyanosis	  Extremities: Normal range of motion, No clubbing, left thigh swelling and improving eccymosis  Vascular: Peripheral pulses palpable 2+ bilaterally        LABS:	 	    CBC Full  -  ( 06 Sep 2019 05:58 )  WBC Count : 14.7 K/uL  Hemoglobin : 10.4 g/dL  Hematocrit : 32.7 %  Platelet Count - Automated : 352 K/uL  Mean Cell Volume : 90.8 fl  Mean Cell Hemoglobin : 28.8 pg  Mean Cell Hemoglobin Concentration : 31.7 gm/dL  Auto Neutrophil # : 11.2 K/uL  Auto Lymphocyte # : 1.6 K/uL  Auto Monocyte # : 1.5 K/uL  Auto Eosinophil # : 0.3 K/uL  Auto Basophil # : 0.1 K/uL  Auto Neutrophil % : 76.3 %  Auto Lymphocyte % : 11.0 %  Auto Monocyte % : 10.0 %  Auto Eosinophil % : 2.2 %  Auto Basophil % : 0.5 %    09-06    127<L>  |  93<L>  |  28<H>  ----------------------------<  236<H>  4.5   |  23  |  1.67<H>  09-05    128<L>  |  94<L>  |  24<H>  ----------------------------<  210<H>  4.3   |  22  |  1.36<H>    Ca    9.3      06 Sep 2019 05:58  Ca    9.4      05 Sep 2019 06:10  Mg     2.2     09-06    TELEMETRY: Afib , 7 beats NSVT	      < from: CT Lower Extremity No Cont, Bilateral (09.05.19 @ 15:28) >    EXAM:  CT LWR EXT BI                            PROCEDURE DATE:  09/05/2019            INTERPRETATION:  EXAMINATION: CT of the lower extremities bilaterally    CLINICAL INFORMATION: Left thigh hematoma    TECHNIQUE: Axial CT images were obtained through the left lower   extremities from the level of the hips to the ankles bilaterally. Coronal   and sagittal reformatted images were made.     FINDINGS: No acute fracture or dislocation is demonstrated. There is   moderate bilateral hip osteoarthritis. There is diffuse subcutaneous soft   tissue infiltration of the left lower extremity. There is also multifocal   myofascial enlargement, heterogeneity and stranding in the proximal left   thigh, with involvement of the left iliopsoas, the left rectus femoris,   the proximal left vastus muscles and the left adductor muscles suggesting   myofascial and hematoma. No well-formed/drainable collections are   demonstrated, however, There is limited evaluation for well-formed   collection without contrast.     IMPRESSION: Diffuse subcutaneous soft tissue infiltration/edema of the   left lower extremity. There is also multifocal myofascial enlargement,   heterogeneity and stranding in the left proximal thigh, with involvement   of the left iliopsoas, the left rectus femoris, the proximal left vastus   muscles and the left adductor muscles, likely related to myofascial edema   and hematoma.   No well-formed/drainable collections are demonstrated,   however, there is limited evaluation for well-formed collection on   noncontrast CT. MRI with and without contrast can be performed to further   evaluate if indicated.                    KRISTY SCHWARTZ M.D., ATTENDING RADIOLOGIST  This document has been electronically signed. Sep  5 2019  5:29PM       < end of copied text >

## 2019-09-06 NOTE — DISCHARGE NOTE PROVIDER - CARE PROVIDER_API CALL
Dennis Kumar (MD)  Family Medicine  [address] [place], [address] [place] [address]  Phone: [phone]  Fax: [phone]  Follow Up Time: 1 week    Alfred Moon)  Cardiology; Internal Medicine  [address] [place] [address]  Phone: [phone]  Fax: [phone]  Follow Up Time: 2 weeks    Tyrone Calle)  Vascular Surgery  [address] [place] [address], [address]  [place] [address]  Phone: [phone]  Fax: [phone]  Follow Up Time: Dennis Kumar (MD)  Family Medicine  [address] [facility], [address]st [place] [address]  Phone: [phone]  Fax: [phone]  Follow Up Time: 1 week    Alfred Moon)  Cardiology; Internal Medicine  [address] [place] [address]  Phone: [phone]  Fax: [phone]  Follow Up Time: 1 week    Tyrone Calle)  Vascular Surgery  [address] [facility], [address]  [place] [address]  Phone: [phone]  Fax: [phone]  Follow Up Time:     Markell Walton)  Internal Medicine; Nephrology  [address] [facility] [address]  [place] [address]  Phone: [phone]  Fax: [phone]  Follow Up Time: 1 week Dennis Kumar)  Family Medicine  [address] [facility], [address]st [place] [address]  Phone: [phone]  Fax: [phone]  Follow Up Time: 1 week    Alfred Moon)  Cardiology; Internal Medicine  [address] [place] [address]  Phone: [phone]  Fax: [phone]  Follow Up Time: 1 week    Tyrone Calle)  Vascular Surgery  [address] [facility], [address]  [place] [address]  Phone: [phone]  Fax: [phone]  Follow Up Time:     Markell Walton)  Internal Medicine; Nephrology  [address] [facility] [address]  [place] [address]  Phone: [phone]  Fax: [phone]  Follow Up Time: 1 week    Rudolph Barnett)  Cardiac Electrophysiology; Cardiology  [address] [place] [address]  Phone: [phone]  Fax: [phone]  Follow Up Time: 2 weeks

## 2019-09-06 NOTE — DISCHARGE NOTE PROVIDER - PROVIDER TOKENS
PROVIDER:[TOKEN:[[id number]],FOLLOWUP:[1 week]],PROVIDER:[TOKEN:[[id number]],FOLLOWUP:[2 weeks]],PROVIDER:[TOKEN:[[id number]]] PROVIDER:[TOKEN:[[id number]],FOLLOWUP:[1 week]],PROVIDER:[TOKEN:[[id number]],FOLLOWUP:[1 week]],PROVIDER:[TOKEN:[[id number]]],PROVIDER:[TOKEN:[[id number]],FOLLOWUP:[1 week]] PROVIDER:[TOKEN:[[id number]],FOLLOWUP:[1 week]],PROVIDER:[TOKEN:[[id number]],FOLLOWUP:[1 week]],PROVIDER:[TOKEN:[[id number]]],PROVIDER:[TOKEN:[[id number]],FOLLOWUP:[1 week]],PROVIDER:[TOKEN:[[id number]],FOLLOWUP:[2 weeks]]

## 2019-09-06 NOTE — DISCHARGE NOTE PROVIDER - CARE PROVIDERS DIRECT ADDRESSES
,DirectAddress_Unknown,DirectAddress_Unknown,[email or web address] ,DirectAddress_Unknown,DirectAddress_Unknown,[email or web address],DirectAddress_Unknown ,DirectAddress_Unknown,DirectAddress_Unknown,[email or web address],DirectAddress_Unknown,[email or web address]

## 2019-09-06 NOTE — DISCHARGE NOTE PROVIDER - NSDCFUADDAPPT_GEN_ALL_CORE_FT
follow up with your PMD in 1 week.   follow up with your cardiology and vascular in 2 weeks.   follow up with EP for watchman procedure if you fail xarelto.

## 2019-09-06 NOTE — DISCHARGE NOTE PROVIDER - NSDCCPCAREPLAN_GEN_ALL_CORE_FT
PRINCIPAL DISCHARGE DIAGNOSIS  Diagnosis: Cellulitis of right lower extremity  Assessment and Plan of Treatment: Completed Cephalexin.   Call your Health Care Provider within two days of arriving home to make a follow up appointment within one week.  If the affected cellulitic area increases in redness, warmth, pain or swelling call your Health Care Provider.  If you develop fever, chills, and/or malaise, call your Health Care Provider.        SECONDARY DISCHARGE DIAGNOSES  Diagnosis: Thrombus of left atrial appendage  Assessment and Plan of Treatment: YVES showing early thrombus in the appendage.    Diagnosis: Hematoma  Assessment and Plan of Treatment: Lt thigh hematoma. CT pelvis and leg showing Lt   seen by vascular surgery. no surgical intervention.   Elevation of Lt leg with application of heating packs as needed.  Continue ACE wrap.       Diagnosis: Hyponatremia  Assessment and Plan of Treatment:     Diagnosis: BARRY (acute kidney injury)  Assessment and Plan of Treatment:     Diagnosis: Afib  Assessment and Plan of Treatment:     Diagnosis: HTN (hypertension)  Assessment and Plan of Treatment:     Diagnosis: DM (diabetes mellitus), type 2  Assessment and Plan of Treatment:     Diagnosis: Cough  Assessment and Plan of Treatment: PRINCIPAL DISCHARGE DIAGNOSIS  Diagnosis: Cellulitis of right lower extremity  Assessment and Plan of Treatment: Completed Cephalexin.   Call your Health Care Provider within two days of arriving home to make a follow up appointment within one week.  If the affected cellulitic area increases in redness, warmth, pain or swelling call your Health Care Provider.  If you develop fever, chills, and/or malaise, call your Health Care Provider.        SECONDARY DISCHARGE DIAGNOSES  Diagnosis: Thrombus of left atrial appendage  Assessment and Plan of Treatment: YVES showing early thrombus in the appendage.  continue xarelto.    Diagnosis: Hematoma  Assessment and Plan of Treatment: Lt thigh hematoma. CT pelvis and leg showing Lt leg myofascial edema and hematoma,  No well-formed/drainable collections.   seen by vascular surgery. no surgical intervention.  Hg/ HCT stable.   Elevation of Lt leg with application of heating packs as needed.  Continue ACE wrap.       Diagnosis: Hyponatremia  Assessment and Plan of Treatment:     Diagnosis: BARRY (acute kidney injury)  Assessment and Plan of Treatment: today's Cr is   Avoid taking (NSAIDs) - (ex: Ibuprofen, Advil, Celebrex, Naprosyn)  Avoid taking any nephrotoxic agents (can harm kidneys) - Intravenous contrast for diagnostic testing, combination cold medications.  Have all medications adjusted for your renal function by your Health Care Provider.  Blood pressure control is important.  Take all medication as prescribed.      Diagnosis: Afib  Assessment and Plan of Treatment: cardioversion cancelled due to JOSE thrombus.    Continue rate control strategy with lopressor to 50mg twice a day and Cardizem CD 180mg daily.  Continue w/ Xarelto.   please follow up EP for a possible Watchman procedure.    Diagnosis: HTN (hypertension)  Assessment and Plan of Treatment: Low salt diet  Activity as tolerated.  Take all medication as prescribed.  Follow up with your medical doctor for routine blood pressure monitoring at your next visit.  Notify your doctor if you have any of the following symptoms:   Dizziness, Lightheadedness, Blurry vision, Headache, Chest pain, Shortness of breath      Diagnosis: DM (diabetes mellitus), type 2  Assessment and Plan of Treatment: HgA1C this admission 9.0.  Make sure you get your HgA1c checked every three months.  If you take oral diabetes medications, check your blood glucose two times a day.  If you take insulin, check your blood glucose before meals and at bedtime.  It's important not to skip any meals.  Keep a log of your blood glucose results and always take it with you to your doctor appointments.  Keep a list of your current medications including injectables and over the counter medications and bring this medication list with you to all your doctor appointments.  If you have not seen your ophthalmologist this year call for appointment.  Check your feet daily for redness, sores, or openings. Do not self treat. If no improvement in two days call your primary care physician for an appointment.  Low blood sugar (hypoglycemia) is a blood sugar below 70mg/dl. Check your blood sugar if you feel signs/symptoms of hypoglycemia. If your blood sugar is below 70 take 15 grams of carbohydrates (ex 4 oz of apple juice, 3-4 glucose tablets, or 4-6 oz of regular soda) wait 15 minutes and repeat blood sugar to make sure it comes up above 70.  If your blood sugar is above 70 and you are due for a meal, have a meal.  If you are not due for a meal have a snack.  This snack helps keeps your blood sugar at a safe range.      Diagnosis: Cough  Assessment and Plan of Treatment: countinue with antitussive.

## 2019-09-06 NOTE — PROGRESS NOTE ADULT - SUBJECTIVE AND OBJECTIVE BOX
Patient is a 64y old  Male who presents with a chief complaint of AF (06 Sep 2019 10:28)                                                               INTERVAL HPI/OVERNIGHT EVENTS:    REVIEW OF SYSTEMS:     CONSTITUTIONAL: No weakness, fevers or chills  EYES/ENT: No visual changes , no ear ache   NECK: No pain or stiffness  RESPIRATORY: No cough, wheezing,  No shortness of breath  CARDIOVASCULAR: No chest pain or palpitations  GASTROINTESTINAL: No abdominal pain  . No nausea, vomiting, or hematemesis; No diarrhea or constipation. No melena or hematochezia.  GENITOURINARY: No dysuria, frequency or hematuria  NEUROLOGICAL: No numbness or weakness  LLE pain improved                                                                                                                                                                                                                                                                                Medications:  MEDICATIONS  (STANDING):  amLODIPine   Tablet 5 milliGRAM(s) Oral daily  aspirin enteric coated 81 milliGRAM(s) Oral daily  atorvastatin 80 milliGRAM(s) Oral at bedtime  benzonatate 100 milliGRAM(s) Oral three times a day  dextrose 5%. 1000 milliLiter(s) (50 mL/Hr) IV Continuous <Continuous>  dextrose 50% Injectable 12.5 Gram(s) IV Push once  dextrose 50% Injectable 25 Gram(s) IV Push once  dextrose 50% Injectable 25 Gram(s) IV Push once  diltiazem    milliGRAM(s) Oral daily  insulin glargine Injectable (LANTUS) 32 Unit(s) SubCutaneous at bedtime  insulin lispro (HumaLOG) corrective regimen sliding scale   SubCutaneous three times a day before meals  insulin lispro (HumaLOG) corrective regimen sliding scale   SubCutaneous at bedtime  insulin lispro Injectable (HumaLOG) 6 Unit(s) SubCutaneous three times a day before meals  metoprolol succinate ER 50 milliGRAM(s) Oral two times a day  rivaroxaban 15 milliGRAM(s) Oral with dinner  tamsulosin 0.4 milliGRAM(s) Oral at bedtime    MEDICATIONS  (PRN):  acetaminophen   Tablet .. 650 milliGRAM(s) Oral every 6 hours PRN Temp greater or equal to 38C (100.4F)  acetaminophen   Tablet .. 650 milliGRAM(s) Oral every 6 hours PRN Mild Pain (1 - 3)  benzocaine 15 mG/menthol 3.6 mG (Sugar-Free) Lozenge 1 Lozenge Oral four times a day PRN Sore Throat  dextrose 40% Gel 15 Gram(s) Oral once PRN Blood Glucose LESS THAN 70 milliGRAM(s)/deciliter  glucagon  Injectable 1 milliGRAM(s) IntraMuscular once PRN Glucose LESS THAN 70 milligrams/deciliter  guaiFENesin   Syrup  (Sugar-Free) 100 milliGRAM(s) Oral every 6 hours PRN Cough  oxyCODONE    5 mG/acetaminophen 325 mG 2 Tablet(s) Oral every 6 hours PRN Severe Pain (7 - 10)  traMADol 25 milliGRAM(s) Oral every 6 hours PRN Moderate Pain (4 - 6)       Allergies    No Known Allergies    Intolerances      Vital Signs Last 24 Hrs  T(C): 36.5 (06 Sep 2019 12:22), Max: 37.7 (06 Sep 2019 03:40)  T(F): 97.7 (06 Sep 2019 12:22), Max: 99.9 (06 Sep 2019 03:40)  HR: 99 (06 Sep 2019 12:22) (92 - 106)  BP: 139/63 (06 Sep 2019 12:22) (122/65 - 139/63)  BP(mean): --  RR: 18 (06 Sep 2019 12:22) (16 - 18)  SpO2: 98% (06 Sep 2019 12:22) (97% - 98%)  CAPILLARY BLOOD GLUCOSE      POCT Blood Glucose.: 410 mg/dL (06 Sep 2019 12:18)  POCT Blood Glucose.: 419 mg/dL (06 Sep 2019 11:52)  POCT Blood Glucose.: 223 mg/dL (06 Sep 2019 08:18)  POCT Blood Glucose.: 223 mg/dL (05 Sep 2019 21:11)  POCT Blood Glucose.: 85 mg/dL (05 Sep 2019 16:36)      09-05 @ 07:01 - 09-06 @ 07:00  --------------------------------------------------------  IN: 480 mL / OUT: 0 mL / NET: 480 mL    09-06 @ 07:01  -  09-06 @ 12:45  --------------------------------------------------------  IN: 480 mL / OUT: 0 mL / NET: 480 mL      Physical Exam:    General:  NAD   HEENT:  Nonicteric, PERRLA  CV:  RRR, S1S2   Lungs:  CTA B/L, no wheezes, rales, rhonchi  Abdomen:  Soft, non-tender, no distended, positive BS  Extremities:  L thigh eccyhmosis /swelling   Neuro:  AAOx3, non-focal, grossly intact                                                                                                                                                                                                                                                                                                LABS:                               10.4   14.7  )-----------( 352      ( 06 Sep 2019 05:58 )             32.7                      09-06    127<L>  |  93<L>  |  28<H>  ----------------------------<  236<H>  4.5   |  23  |  1.67<H>    Ca    9.3      06 Sep 2019 05:58  Mg     2.2     09-06
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
## 2019-09-06 NOTE — CHART NOTE - NSCHARTNOTEFT_GEN_A_CORE
Patient is a 64y old  Male who presents with a chief complaint of RLE pain (05 Sep 2019 14:04)  Informed by RN of pt having 6 beats of WCT overnight while asleep  Tele reviewed, noted to have 6 beats of aberrant beats vs. WCT   Pt seen & examined, he denied having cp, sob, dizziness, n/v or palpitations        Vital Signs Last 24 Hrs  T(C): 37.6 (06 Sep 2019 04:32), Max: 37.7 (06 Sep 2019 03:40)  T(F): 99.6 (06 Sep 2019 04:32), Max: 99.9 (06 Sep 2019 03:40)  HR: 106 (06 Sep 2019 05:34) (82 - 106)  BP: 136/65 (06 Sep 2019 05:34) (122/65 - 137/72)  BP(mean): --  RR: 18 (06 Sep 2019 04:32) (16 - 18)  SpO2: 98% (06 Sep 2019 04:32) (97% - 98%)      Labs:                          10.4   14.7  )-----------( 352      ( 06 Sep 2019 05:58 )             32.7     09-06    127<L>  |  93<L>  |  28<H>  ----------------------------<  236<H>  4.5   |  23  |  1.67<H>    Ca    9.3      06 Sep 2019 05:58  Mg     2.2     09-06              Radiology:    Physical Exam:  General: WN/WD NAD  Neurology: A&Ox3, nonfocal, RAMOS x 4  Head:  Normocephalic, atraumatic  Respiratory: CTA B/L  CV: RRR, S1S2, no murmur  Abdominal: Soft, non tender, non distended, + BS  MSK: No edema, + peripheral pulses, FROM all 4 extremity          Lt inner & posterior thigh hematoma resolving    Assessment & Plan:  HPI:  64M with PMHx of CAD (post-three stents), HTN, HLD, T2DM (complicated by peripheral neuropathy), afib on AC presenting with Le cellulitis /fever "not responding to abx " as o/p.  Pt was discharged last week  and since then noted some swelling in RLE and occasional pain... three days ago was seen in ED and was found to have cellulitis of RLE and sent on keflex... yesterday pt had a follow up with cardio and was found to have fever and was told to come to ER however he only reported to Ed today..  still c./o pain in foot though seems to be less..  erythema with some improvement   no N/V/d   no truama   and no hx of gout   in ED was found to have FS>600   met sepsis criteria   BARRY  w CR of 1.7   hyponatremia of sodium 130 (29 Aug 2019 21:10)    Pt seen this AM for noted 6 aberrant beats vs. WCT while asleep  Pt remained asymptomatic; VSS    PLAN:  >Will continue to monitor  >Continue present medication regimen  >Maintain K > 4 Mg > 2  >Metoprolol XL 50 mg PO given  >Endorse to day team; follow up per Attending

## 2019-09-06 NOTE — PROGRESS NOTE ADULT - ASSESSMENT
Anemia - he has kidney disease and had a cellulitis.  AOCD process.  Hgb relatively stbl and adequate and can monitor.  TREMAYNE still pending.      CT thigh noted (edema) management per primary team

## 2019-09-06 NOTE — PROGRESS NOTE ADULT - SUBJECTIVE AND OBJECTIVE BOX
Discussed with patient  He had a Left carotid endarterectomy with Dr. Calle and is known to vascular surgery service  Please ask vascular surgery to see and follow the patient    There is a hematoma in the leg, and he remains on Xarelto  Await vascular surgery input and cardiology input.    I wonder if he would better served with a reversible anticoagulant or a watchman.    Will sign off as vasc sx will be following      Thanks    Sergio Rivera  Vascular Cardiology Discussed with patient  He had a Left carotid endarterectomy with Dr. Calle and is known to vascular surgery service  Please ask vascular surgery to see and follow the patient    There is a hematoma in the leg, and he remains on Xarelto - counts are holding  Await vascular surgery input and cardiology input.         Will sign off as vasc sx will be following      Thanks    Sergio Rivera  Vascular Cardiology

## 2019-09-06 NOTE — PROGRESS NOTE ADULT - SUBJECTIVE AND OBJECTIVE BOX
CC: f/u for leukocytosis and low grade fever    Patient reports: no new complaints, left thigh swelling is unchanged    REVIEW OF SYSTEMS:  All other review of systems negative (Comprehensive ROS)    Antimicrobials Day #  :off    Other Medications Reviewed    T(F): 97.7 (19 @ 12:22), Max: 99.9 (19 @ 03:40)  HR: 99 (19 @ 12:22)  BP: 139/63 (19 @ 12:22)  RR: 18 (19 @ 12:22)  SpO2: 98% (19 @ 12:22)  Wt(kg): --    PHYSICAL EXAM:  General: alert, no acute distress  Eyes:  anicteric, no conjunctival injection, no discharge  Oropharynx: no lesions or injection 	  Neck: supple, without adenopathy  Lungs: clear to auscultation  Heart: irregular rate and rhythm; no murmur, rubs or gallops  Abdomen: soft, nondistended, nontender, without mass or organomegaly  Skin: no lesions  Extremities: left thigh with warmth and swelling and ecchymosis  Neurologic: alert, oriented, moves all extremities    LAB RESULTS:                        10.4   14.7  )-----------( 352      ( 06 Sep 2019 05:58 )             32.7         124<L>  |  90<L>  |  34<H>  ----------------------------<  441<H>  4.8   |  22  |  1.58<H>    Ca    9.3      06 Sep 2019 12:43  Mg     2.2             Urinalysis Basic - ( 05 Sep 2019 18:46 )    Color: Yellow / Appearance: Clear / S.025 / pH: x  Gluc: x / Ketone: Negative  / Bili: Negative / Urobili: <2 mg/dL   Blood: x / Protein: 30 mg/dL / Nitrite: Negative   Leuk Esterase: Negative / RBC: 50 /HPF / WBC 3 /HPF   Sq Epi: x / Non Sq Epi: 2 /HPF / Bacteria: Negative      MICROBIOLOGY:  RECENT CULTURES:      RADIOLOGY REVIEWED:    < from: CT Lower Extremity No Cont, Bilateral (19 @ 15:28) >  IMPRESSION: Diffuse subcutaneous soft tissue infiltration/edema of the   left lower extremity. There is also multifocal myofascial enlargement,   heterogeneity and stranding in the left proximal thigh, with involvement   of the left iliopsoas, the left rectus femoris, the proximal left vastus   muscles and the left adductor muscles, likely related to myofascial edema   and hematoma.   No well-formed/drainable collections are demonstrated,   however, there is limited evaluation for well-formed collection on   noncontrast CT. MRI with and without contrast can be performed to further   evaluate if indicated.    < end of copied text >

## 2019-09-06 NOTE — PROGRESS NOTE ADULT - SUBJECTIVE AND OBJECTIVE BOX
Chief complaint  Patient is a 64y old  Male who presents with a chief complaint of Leg pain (06 Sep 2019 15:06)   Review of systems  Patient in bed, looks comfortable, no fever, no hypoglycemia.    Labs and Fingersticks  CAPILLARY BLOOD GLUCOSE      POCT Blood Glucose.: 410 mg/dL (06 Sep 2019 12:18)  POCT Blood Glucose.: 419 mg/dL (06 Sep 2019 11:52)  POCT Blood Glucose.: 223 mg/dL (06 Sep 2019 08:18)  POCT Blood Glucose.: 223 mg/dL (05 Sep 2019 21:11)  POCT Blood Glucose.: 85 mg/dL (05 Sep 2019 16:36)      Anion Gap, Serum: 12 (09-06 @ 12:43)  Anion Gap, Serum: 11 (09-06 @ 05:58)  Anion Gap, Serum: 12 (09-05 @ 06:10)      Calcium, Total Serum: 9.3 (09-06 @ 12:43)  Calcium, Total Serum: 9.3 (09-06 @ 05:58)  Calcium, Total Serum: 9.4 (09-05 @ 06:10)          09-06    124<L>  |  90<L>  |  34<H>  ----------------------------<  441<H>  4.8   |  22  |  1.58<H>    Ca    9.3      06 Sep 2019 12:43  Mg     2.2     09-06                          10.4   14.7  )-----------( 352      ( 06 Sep 2019 05:58 )             32.7     Medications  MEDICATIONS  (STANDING):  amLODIPine   Tablet 5 milliGRAM(s) Oral daily  aspirin enteric coated 81 milliGRAM(s) Oral daily  atorvastatin 80 milliGRAM(s) Oral at bedtime  benzonatate 100 milliGRAM(s) Oral three times a day  dextrose 5%. 1000 milliLiter(s) (50 mL/Hr) IV Continuous <Continuous>  dextrose 50% Injectable 12.5 Gram(s) IV Push once  dextrose 50% Injectable 25 Gram(s) IV Push once  dextrose 50% Injectable 25 Gram(s) IV Push once  diltiazem    milliGRAM(s) Oral daily  insulin glargine Injectable (LANTUS) 32 Unit(s) SubCutaneous at bedtime  insulin lispro (HumaLOG) corrective regimen sliding scale   SubCutaneous three times a day before meals  insulin lispro (HumaLOG) corrective regimen sliding scale   SubCutaneous at bedtime  insulin lispro Injectable (HumaLOG) 8 Unit(s) SubCutaneous three times a day before meals  metoprolol succinate ER 50 milliGRAM(s) Oral two times a day  rivaroxaban 15 milliGRAM(s) Oral with dinner  sodium chloride 0.9%. 1000 milliLiter(s) (75 mL/Hr) IV Continuous <Continuous>  tamsulosin 0.4 milliGRAM(s) Oral at bedtime      Physical Exam  General: Patient comfortable in bed  Vital Signs Last 12 Hrs  T(F): 97.7 (09-06-19 @ 12:22), Max: 99.9 (09-06-19 @ 03:40)  HR: 99 (09-06-19 @ 12:22) (92 - 106)  BP: 139/63 (09-06-19 @ 12:22) (122/65 - 139/63)  BP(mean): --  RR: 18 (09-06-19 @ 12:22) (16 - 18)  SpO2: 98% (09-06-19 @ 12:22) (98% - 98%)  Neck: No palpable thyroid nodules.  CVS: S1S2, No murmurs  Respiratory: No wheezing, no crepitations  GI: Abdomen soft, bowel sounds positive  Musculoskeletal:  edema lower extremities.   Skin: No skin rashes, no ecchymosis    Diagnostics Chief complaint  Patient is a 64y old  Male who presents with a chief complaint of Leg pain (06 Sep 2019 15:06)   Review of systems  Patient in bed, looks comfortable, no fever, no  hypoglycemia.    Labs and Fingersticks  CAPILLARY BLOOD GLUCOSE      POCT Blood Glucose.: 410 mg/dL (06 Sep 2019 12:18)  POCT Blood Glucose.: 419 mg/dL (06 Sep 2019 11:52)  POCT Blood Glucose.: 223 mg/dL (06 Sep 2019 08:18)  POCT Blood Glucose.: 223 mg/dL (05 Sep 2019 21:11)  POCT Blood Glucose.: 85 mg/dL (05 Sep 2019 16:36)      Anion Gap, Serum: 12 (09-06 @ 12:43)  Anion Gap, Serum: 11 (09-06 @ 05:58)  Anion Gap, Serum: 12 (09-05 @ 06:10)      Calcium, Total Serum: 9.3 (09-06 @ 12:43)  Calcium, Total Serum: 9.3 (09-06 @ 05:58)  Calcium, Total Serum: 9.4 (09-05 @ 06:10)          09-06    124<L>  |  90<L>  |  34<H>  ----------------------------<  441<H>  4.8   |  22  |  1.58<H>    Ca    9.3      06 Sep 2019 12:43  Mg     2.2     09-06                          10.4   14.7  )-----------( 352      ( 06 Sep 2019 05:58 )             32.7     Medications  MEDICATIONS  (STANDING):  amLODIPine   Tablet 5 milliGRAM(s) Oral daily  aspirin enteric coated 81 milliGRAM(s) Oral daily  atorvastatin 80 milliGRAM(s) Oral at bedtime  benzonatate 100 milliGRAM(s) Oral three times a day  dextrose 5%. 1000 milliLiter(s) (50 mL/Hr) IV Continuous <Continuous>  dextrose 50% Injectable 12.5 Gram(s) IV Push once  dextrose 50% Injectable 25 Gram(s) IV Push once  dextrose 50% Injectable 25 Gram(s) IV Push once  diltiazem    milliGRAM(s) Oral daily  insulin glargine Injectable (LANTUS) 32 Unit(s) SubCutaneous at bedtime  insulin lispro (HumaLOG) corrective regimen sliding scale   SubCutaneous three times a day before meals  insulin lispro (HumaLOG) corrective regimen sliding scale   SubCutaneous at bedtime  insulin lispro Injectable (HumaLOG) 8 Unit(s) SubCutaneous three times a day before meals  metoprolol succinate ER 50 milliGRAM(s) Oral two times a day  rivaroxaban 15 milliGRAM(s) Oral with dinner  sodium chloride 0.9%. 1000 milliLiter(s) (75 mL/Hr) IV Continuous <Continuous>  tamsulosin 0.4 milliGRAM(s) Oral at bedtime      Physical Exam  General: Patient comfortable in bed  Vital Signs Last 12 Hrs  T(F): 97.7 (09-06-19 @ 12:22), Max: 99.9 (09-06-19 @ 03:40)  HR: 99 (09-06-19 @ 12:22) (92 - 106)  BP: 139/63 (09-06-19 @ 12:22) (122/65 - 139/63)  BP(mean): --  RR: 18 (09-06-19 @ 12:22) (16 - 18)  SpO2: 98% (09-06-19 @ 12:22) (98% - 98%)  Neck: No palpable thyroid nodules.  CVS: S1S2, No murmurs  Respiratory: No wheezing, no crepitations  GI: Abdomen soft, bowel sounds positive  Musculoskeletal:  edema lower extremities.   Skin: No skin rashes, no ecchymosis    Diagnostics

## 2019-09-06 NOTE — PROGRESS NOTE ADULT - PROBLEM SELECTOR PLAN 1
Will continue current insulin regimen for now. Will continue monitoring FS, log, and follow up.  Patient counseled for compliance with consistent low carb diet. Will increase Humalog to 8u before each meal, continue Lantus 32u at bedtime, as well as coverage scale. Will continue monitoring FS, log, and follow up.  Patient counseled for compliance with consistent low carb diet.

## 2019-09-07 LAB
ANION GAP SERPL CALC-SCNC: 15 MMOL/L — SIGNIFICANT CHANGE UP (ref 5–17)
BASOPHILS # BLD AUTO: 0 K/UL — SIGNIFICANT CHANGE UP (ref 0–0.2)
BASOPHILS NFR BLD AUTO: 0.4 % — SIGNIFICANT CHANGE UP (ref 0–2)
BUN SERPL-MCNC: 28 MG/DL — HIGH (ref 7–23)
CALCIUM SERPL-MCNC: 9.4 MG/DL — SIGNIFICANT CHANGE UP (ref 8.4–10.5)
CHLORIDE SERPL-SCNC: 92 MMOL/L — LOW (ref 96–108)
CK SERPL-CCNC: 164 U/L — SIGNIFICANT CHANGE UP (ref 30–200)
CO2 SERPL-SCNC: 22 MMOL/L — SIGNIFICANT CHANGE UP (ref 22–31)
CREAT SERPL-MCNC: 1.49 MG/DL — HIGH (ref 0.5–1.3)
EOSINOPHIL # BLD AUTO: 0.3 K/UL — SIGNIFICANT CHANGE UP (ref 0–0.5)
EOSINOPHIL NFR BLD AUTO: 2.8 % — SIGNIFICANT CHANGE UP (ref 0–6)
GLUCOSE BLDC GLUCOMTR-MCNC: 135 MG/DL — HIGH (ref 70–99)
GLUCOSE BLDC GLUCOMTR-MCNC: 203 MG/DL — HIGH (ref 70–99)
GLUCOSE BLDC GLUCOMTR-MCNC: 228 MG/DL — HIGH (ref 70–99)
GLUCOSE BLDC GLUCOMTR-MCNC: 228 MG/DL — HIGH (ref 70–99)
GLUCOSE BLDC GLUCOMTR-MCNC: 249 MG/DL — HIGH (ref 70–99)
GLUCOSE SERPL-MCNC: 217 MG/DL — HIGH (ref 70–99)
HCT VFR BLD CALC: 29.2 % — LOW (ref 39–50)
HCT VFR BLD CALC: 30.3 % — LOW (ref 39–50)
HGB BLD-MCNC: 9.8 G/DL — LOW (ref 13–17)
HGB BLD-MCNC: 9.8 G/DL — LOW (ref 13–17)
LYMPHOCYTES # BLD AUTO: 1.3 K/UL — SIGNIFICANT CHANGE UP (ref 1–3.3)
LYMPHOCYTES # BLD AUTO: 10.8 % — LOW (ref 13–44)
MAGNESIUM SERPL-MCNC: 2.2 MG/DL — SIGNIFICANT CHANGE UP (ref 1.6–2.6)
MCHC RBC-ENTMCNC: 29.1 PG — SIGNIFICANT CHANGE UP (ref 27–34)
MCHC RBC-ENTMCNC: 30.6 PG — SIGNIFICANT CHANGE UP (ref 27–34)
MCHC RBC-ENTMCNC: 32.3 GM/DL — SIGNIFICANT CHANGE UP (ref 32–36)
MCHC RBC-ENTMCNC: 33.4 GM/DL — SIGNIFICANT CHANGE UP (ref 32–36)
MCV RBC AUTO: 90.1 FL — SIGNIFICANT CHANGE UP (ref 80–100)
MCV RBC AUTO: 91.5 FL — SIGNIFICANT CHANGE UP (ref 80–100)
MONOCYTES # BLD AUTO: 1.2 K/UL — HIGH (ref 0–0.9)
MONOCYTES NFR BLD AUTO: 9.7 % — SIGNIFICANT CHANGE UP (ref 2–14)
NEUTROPHILS # BLD AUTO: 9.2 K/UL — HIGH (ref 1.8–7.4)
NEUTROPHILS NFR BLD AUTO: 76.4 % — SIGNIFICANT CHANGE UP (ref 43–77)
PHOSPHATE SERPL-MCNC: 3 MG/DL — SIGNIFICANT CHANGE UP (ref 2.5–4.5)
PLATELET # BLD AUTO: 350 K/UL — SIGNIFICANT CHANGE UP (ref 150–400)
PLATELET # BLD AUTO: 378 K/UL — SIGNIFICANT CHANGE UP (ref 150–400)
POTASSIUM SERPL-MCNC: 4.3 MMOL/L — SIGNIFICANT CHANGE UP (ref 3.5–5.3)
POTASSIUM SERPL-SCNC: 4.3 MMOL/L — SIGNIFICANT CHANGE UP (ref 3.5–5.3)
RBC # BLD: 3.19 M/UL — LOW (ref 4.2–5.8)
RBC # BLD: 3.36 M/UL — LOW (ref 4.2–5.8)
RBC # FLD: 11.9 % — SIGNIFICANT CHANGE UP (ref 10.3–14.5)
RBC # FLD: 12 % — SIGNIFICANT CHANGE UP (ref 10.3–14.5)
SODIUM SERPL-SCNC: 129 MMOL/L — LOW (ref 135–145)
WBC # BLD: 12.1 K/UL — HIGH (ref 3.8–10.5)
WBC # BLD: 15 K/UL — HIGH (ref 3.8–10.5)
WBC # FLD AUTO: 12.1 K/UL — HIGH (ref 3.8–10.5)
WBC # FLD AUTO: 15 K/UL — HIGH (ref 3.8–10.5)

## 2019-09-07 RX ADMIN — Medication 180 MILLIGRAM(S): at 05:57

## 2019-09-07 RX ADMIN — TAMSULOSIN HYDROCHLORIDE 0.4 MILLIGRAM(S): 0.4 CAPSULE ORAL at 22:29

## 2019-09-07 RX ADMIN — Medication 50 MILLIGRAM(S): at 17:22

## 2019-09-07 RX ADMIN — Medication 8 UNIT(S): at 08:15

## 2019-09-07 RX ADMIN — OXYCODONE AND ACETAMINOPHEN 2 TABLET(S): 5; 325 TABLET ORAL at 06:57

## 2019-09-07 RX ADMIN — INSULIN GLARGINE 32 UNIT(S): 100 INJECTION, SOLUTION SUBCUTANEOUS at 22:50

## 2019-09-07 RX ADMIN — TRAMADOL HYDROCHLORIDE 25 MILLIGRAM(S): 50 TABLET ORAL at 03:08

## 2019-09-07 RX ADMIN — OXYCODONE AND ACETAMINOPHEN 2 TABLET(S): 5; 325 TABLET ORAL at 05:57

## 2019-09-07 RX ADMIN — ATORVASTATIN CALCIUM 80 MILLIGRAM(S): 80 TABLET, FILM COATED ORAL at 22:29

## 2019-09-07 RX ADMIN — Medication 81 MILLIGRAM(S): at 12:52

## 2019-09-07 RX ADMIN — OXYCODONE AND ACETAMINOPHEN 2 TABLET(S): 5; 325 TABLET ORAL at 22:36

## 2019-09-07 RX ADMIN — AMLODIPINE BESYLATE 5 MILLIGRAM(S): 2.5 TABLET ORAL at 05:57

## 2019-09-07 RX ADMIN — Medication 8 UNIT(S): at 17:22

## 2019-09-07 RX ADMIN — Medication 4: at 12:49

## 2019-09-07 RX ADMIN — RIVAROXABAN 15 MILLIGRAM(S): KIT at 17:23

## 2019-09-07 RX ADMIN — Medication 50 MILLIGRAM(S): at 05:57

## 2019-09-07 RX ADMIN — TRAMADOL HYDROCHLORIDE 25 MILLIGRAM(S): 50 TABLET ORAL at 04:08

## 2019-09-07 RX ADMIN — OXYCODONE AND ACETAMINOPHEN 2 TABLET(S): 5; 325 TABLET ORAL at 00:07

## 2019-09-07 RX ADMIN — Medication 4: at 08:14

## 2019-09-07 RX ADMIN — Medication 8 UNIT(S): at 12:49

## 2019-09-07 RX ADMIN — BENZOCAINE AND MENTHOL 1 LOZENGE: 5; 1 LIQUID ORAL at 22:51

## 2019-09-07 RX ADMIN — OXYCODONE AND ACETAMINOPHEN 2 TABLET(S): 5; 325 TABLET ORAL at 23:36

## 2019-09-07 NOTE — PROGRESS NOTE ADULT - SUBJECTIVE AND OBJECTIVE BOX
Chief complaint  Patient is a 64y old  Male who presents with a chief complaint of Cellulitis of right lower extremity (07 Sep 2019 19:58)   Review of systems  Patient in bed, looks comfortable, no fever, no hypoglycemia.    Labs and Fingersticks  CAPILLARY BLOOD GLUCOSE      POCT Blood Glucose.: 249 mg/dL (07 Sep 2019 21:26)  POCT Blood Glucose.: 135 mg/dL (07 Sep 2019 17:11)  POCT Blood Glucose.: 228 mg/dL (07 Sep 2019 12:09)  POCT Blood Glucose.: 203 mg/dL (07 Sep 2019 07:59)  POCT Blood Glucose.: 195 mg/dL (06 Sep 2019 22:10)      Anion Gap, Serum: 15 (09-07 @ 07:03)  Anion Gap, Serum: 12 (09-06 @ 12:43)  Anion Gap, Serum: 11 (09-06 @ 05:58)      Calcium, Total Serum: 9.4 (09-07 @ 07:03)  Calcium, Total Serum: 9.3 (09-06 @ 12:43)  Calcium, Total Serum: 9.3 (09-06 @ 05:58)          09-07    129<L>  |  92<L>  |  28<H>  ----------------------------<  217<H>  4.3   |  22  |  1.49<H>    Ca    9.4      07 Sep 2019 07:03  Phos  3.0     09-07  Mg     2.2     09-07                          9.8    15.0  )-----------( 378      ( 07 Sep 2019 18:10 )             29.2     Medications  MEDICATIONS  (STANDING):  amLODIPine   Tablet 5 milliGRAM(s) Oral daily  aspirin enteric coated 81 milliGRAM(s) Oral daily  atorvastatin 80 milliGRAM(s) Oral at bedtime  benzonatate 100 milliGRAM(s) Oral three times a day  dextrose 5%. 1000 milliLiter(s) (50 mL/Hr) IV Continuous <Continuous>  dextrose 50% Injectable 12.5 Gram(s) IV Push once  dextrose 50% Injectable 25 Gram(s) IV Push once  dextrose 50% Injectable 25 Gram(s) IV Push once  diltiazem    milliGRAM(s) Oral daily  insulin glargine Injectable (LANTUS) 32 Unit(s) SubCutaneous at bedtime  insulin lispro (HumaLOG) corrective regimen sliding scale   SubCutaneous three times a day before meals  insulin lispro (HumaLOG) corrective regimen sliding scale   SubCutaneous at bedtime  insulin lispro Injectable (HumaLOG) 8 Unit(s) SubCutaneous three times a day before meals  metoprolol succinate ER 50 milliGRAM(s) Oral two times a day  rivaroxaban 15 milliGRAM(s) Oral with dinner  sodium chloride 0.9%. 1000 milliLiter(s) (75 mL/Hr) IV Continuous <Continuous>  tamsulosin 0.4 milliGRAM(s) Oral at bedtime      Physical Exam  General: Patient comfortable in bed  Vital Signs Last 12 Hrs  T(F): 99.5 (09-07-19 @ 20:22), Max: 99.5 (09-07-19 @ 20:22)  HR: 76 (09-07-19 @ 20:22) (76 - 112)  BP: 139/70 (09-07-19 @ 20:22) (119/73 - 152/80)  BP(mean): --  RR: 18 (09-07-19 @ 20:22) (18 - 18)  SpO2: 98% (09-07-19 @ 20:22) (95% - 99%)  Neck: No palpable thyroid nodules.  CVS: S1S2, No murmurs  Respiratory: No wheezing, no crepitations  GI: Abdomen soft, bowel sounds positive  Musculoskeletal:  edema lower extremities.   Skin: No skin rashes, no ecchymosis    Diagnostics
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
## 2019-09-07 NOTE — PROGRESS NOTE ADULT - SUBJECTIVE AND OBJECTIVE BOX
SUBJECTIVE: The patient denies chest pain, shortness of breath, arm pain or jaw pain, dizziness or palpitations.                     He reports leg discomfort secondary to edema.  Now wrapped in ace bandage    MEDICATIONS  (STANDING):  amLODIPine   Tablet 5 milliGRAM(s) Oral daily  aspirin enteric coated 81 milliGRAM(s) Oral daily  atorvastatin 80 milliGRAM(s) Oral at bedtime  benzonatate 100 milliGRAM(s) Oral three times a day  dextrose 5%. 1000 milliLiter(s) (50 mL/Hr) IV Continuous <Continuous>  dextrose 50% Injectable 12.5 Gram(s) IV Push once  dextrose 50% Injectable 25 Gram(s) IV Push once  dextrose 50% Injectable 25 Gram(s) IV Push once  diltiazem    milliGRAM(s) Oral daily  insulin glargine Injectable (LANTUS) 32 Unit(s) SubCutaneous at bedtime  insulin lispro (HumaLOG) corrective regimen sliding scale   SubCutaneous three times a day before meals  insulin lispro (HumaLOG) corrective regimen sliding scale   SubCutaneous at bedtime  insulin lispro Injectable (HumaLOG) 8 Unit(s) SubCutaneous three times a day before meals  metoprolol succinate ER 50 milliGRAM(s) Oral two times a day  rivaroxaban 15 milliGRAM(s) Oral with dinner  sodium chloride 0.9%. 1000 milliLiter(s) (75 mL/Hr) IV Continuous <Continuous>  tamsulosin 0.4 milliGRAM(s) Oral at bedtime    MEDICATIONS  (PRN):  acetaminophen   Tablet .. 650 milliGRAM(s) Oral every 6 hours PRN Temp greater or equal to 38C (100.4F)  acetaminophen   Tablet .. 650 milliGRAM(s) Oral every 6 hours PRN Mild Pain (1 - 3)  benzocaine 15 mG/menthol 3.6 mG (Sugar-Free) Lozenge 1 Lozenge Oral four times a day PRN Sore Throat  dextrose 40% Gel 15 Gram(s) Oral once PRN Blood Glucose LESS THAN 70 milliGRAM(s)/deciliter  glucagon  Injectable 1 milliGRAM(s) IntraMuscular once PRN Glucose LESS THAN 70 milligrams/deciliter  guaiFENesin   Syrup  (Sugar-Free) 100 milliGRAM(s) Oral every 6 hours PRN Cough  oxyCODONE    5 mG/acetaminophen 325 mG 2 Tablet(s) Oral every 6 hours PRN Severe Pain (7 - 10)  traMADol 25 milliGRAM(s) Oral every 6 hours PRN Moderate Pain (4 - 6)        REVIEW OF SYSTEMS:    CONSTITUTIONAL: No fever, weight loss, or fatigue  EYES: No eye pain, visual disturbances, or discharge  NECK: No pain or stiffness  RESPIRATORY: No cough, wheezing, chills or hemoptysis; No Shortness of Breath  CARDIOVASCULAR: No chest pain, palpitations, dizziness, or leg swelling  GASTROINTESTINAL: No abdominal or epigastric pain. No nausea, vomiting, or hematemesis; No diarrhea or constipation. No melena or hematochezia.  GENITOURINARY: No dysuria, frequency, hematuria, or incontinence  NEUROLOGICAL: No headaches, memory loss, loss of strength, numbness, or tremors  SKIN: No itching, burning, rashes, or lesions   LYMPH Nodes: No enlarged glands  MUSCULOSKELETAL: No joint pain or swelling; No muscle, back, or extremity pain  All other review of systems are negative.  	  [ ] Unable to obtain    PHYSICAL EXAM:  T(F): 98.9 (09-07-19 @ 04:21), Max: 99.4 (09-06-19 @ 23:09)  HR: 108 (09-07-19 @ 04:21) (99 - 108)  BP: 129/67 (09-07-19 @ 04:21) (123/76 - 139/63)  RR: 18 (09-07-19 @ 04:21) (18 - 18)  SpO2: 96% (09-07-19 @ 04:21) (96% - 98%)  Wt(kg): --  ,   I&O's Summary    06 Sep 2019 07:01  -  07 Sep 2019 07:00  --------------------------------------------------------  IN: 1880 mL / OUT: 1610 mL / NET: 270 mL          PHYSICAL EXAM    Appearance: Normal	  HEENT:   Normal oral mucosa, PERRL, EOMI	  NECK: Soft and supple, No LAD, No JVD  Cardiovascular: Regular Rate and Rhythm, Normal S1 S2, No murmurs, No clicks, gallops or rubs  Respiratory: Lungs clear to auscultation	  Gastrointestinal:  Soft, Non-tender, + BS	  Skin: No rashes, No ecchymoses, No cyanosis  Neurologic: Non-focal  Extremities: No clubbing, cyanosis or edema  Vascular: Peripheral pulses palpable 2+ bilaterally    TELEMETRY: 	  AF 80 - 110    LABS:	 	                            10.9   14.84 )-----------( 329      ( 05 Sep 2019 08:41 )             31.8     09-05    128<L>  |  94<L>  |  24<H>  ----------------------------<  210<H>  4.3   |  22  |  1.36<H>    Ca    9.4      05 Sep 2019 06:10  Mg     2.0     09-04    TPro  6.4  /  Alb  3.6  /  TBili  1.7<H>  /  DBili  x   /  AST  36  /  ALT  30  /  AlkPhos  115  09-04

## 2019-09-07 NOTE — PROGRESS NOTE ADULT - ASSESSMENT
Assessment /PLAN:  DMT2: 64y Male with DM T2 with hyperglycemia, now on basal bolus insulin, FS improving, c/o left leg swelling ad pain, no fever, no hypoglycemia.   Cellulitis Left leg: On antibiotics, stable. Has pain and edema of the legs, positive hematoma. Being worked up, ID FU   CAD: On medications, no chest pain, stable, monitored  HTN: Controlled,  on antihypertensive medications.

## 2019-09-07 NOTE — PROGRESS NOTE ADULT - ASSESSMENT
Jean Claude is a very pleasant 64 Year-Old Gentleman with CAD, Afib on A/C, presenting with L thigh hematoma.     - No Surgical intervention indicated at this time.   - Appreciate Cardiology input.   - Would recommend holding anticoagulation; however may not be feasible if the risks outweigh the benefits - as to be determined by primary team.  - Also would recommend CTA if concern for ongoing bleeding, however given patients elevated Cr, may not be possible at this time      Vascular Surgery Pager #[phone]

## 2019-09-07 NOTE — PROGRESS NOTE ADULT - SUBJECTIVE AND OBJECTIVE BOX
Patient seen and examined.  resting in bed.   Said Flomax is really helpful to urinate out- please prescribe it     REVIEW OF SYSTEMS:  As per HPI, otherwise 8 full 10 ROS were unremarkable    MEDICATIONS  (STANDING):  amLODIPine   Tablet 5 milliGRAM(s) Oral daily  aspirin enteric coated 81 milliGRAM(s) Oral daily  atorvastatin 80 milliGRAM(s) Oral at bedtime  benzonatate 100 milliGRAM(s) Oral three times a day  dextrose 5%. 1000 milliLiter(s) (50 mL/Hr) IV Continuous <Continuous>  dextrose 50% Injectable 12.5 Gram(s) IV Push once  dextrose 50% Injectable 25 Gram(s) IV Push once  dextrose 50% Injectable 25 Gram(s) IV Push once  diltiazem    milliGRAM(s) Oral daily  insulin glargine Injectable (LANTUS) 32 Unit(s) SubCutaneous at bedtime  insulin lispro (HumaLOG) corrective regimen sliding scale   SubCutaneous three times a day before meals  insulin lispro (HumaLOG) corrective regimen sliding scale   SubCutaneous at bedtime  insulin lispro Injectable (HumaLOG) 8 Unit(s) SubCutaneous three times a day before meals  metoprolol succinate ER 50 milliGRAM(s) Oral two times a day  rivaroxaban 15 milliGRAM(s) Oral with dinner  sodium chloride 0.9%. 1000 milliLiter(s) (75 mL/Hr) IV Continuous <Continuous>  tamsulosin 0.4 milliGRAM(s) Oral at bedtime      VITAL:  T(C): , Max: 37.4 (09-06-19 @ 23:09)  T(F): , Max: 99.4 (09-06-19 @ 23:09)  HR: 112 (09-07-19 @ 17:16)  BP: 152/80 (09-07-19 @ 17:16)  BP(mean): --  RR: 18 (09-07-19 @ 17:16)  SpO2: 99% (09-07-19 @ 17:16)  Wt(kg): --    I and O's:    09-06 @ 07:01  -  09-07 @ 07:00  --------------------------------------------------------  IN: 1880 mL / OUT: 1610 mL / NET: 270 mL    09-07 @ 07:01  -  09-07 @ 19:59  --------------------------------------------------------  IN: 240 mL / OUT: 650 mL / NET: -410 mL          PHYSICAL EXAM:    Constitutional: NAD  Neck: No JVD  Respiratory: CTAB  Cardiovascular: S1 and S2  Gastrointestinal: BS+, soft, NT/ND  Extremities: + edema in LT LE  Neurological: A/O x 3, no focal deficits  Psychiatric: Normal mood, normal affect  : No Earl      LABS:                        9.8    15.0  )-----------( 378      ( 07 Sep 2019 18:10 )             29.2     09-07    129<L>  |  92<L>  |  28<H>  ----------------------------<  217<H>  4.3   |  22  |  1.49<H>    Ca    9.4      07 Sep 2019 07:03  Phos  3.0     09-07  Mg     2.2     09-07

## 2019-09-07 NOTE — PROGRESS NOTE ADULT - ASSESSMENT
64M with PMHx of HTN, HLD, T2DM, DM neuropathy, afib, and CAD, 8/29/19 a/w RLE cellulitis    (1)Renal - CKD 2-3; minimal proteinuria - early diabetic nephropathy  (2) Mikey DD pre-renal azotemia 2ry to decreased effective circulatory volume vs obstructive uropathy- New UA showing + protein and + RBC:   Creatinine improved   (3) serum Na+ for hyperglycemia =131  (4) s/p NS at 75 cc/hr x 15 hours  (5) urine na <20, U/Creatinine 92  (6) Phos 3.0,     RECOMMENDATIONS:  - a/w Flomax   - Post void bladder scan  - BMP daily  - Renal dosing of meds to cr cl of 30-35 ml/min 64M with PMHx of HTN, HLD, T2DM, DM neuropathy, afib, and CAD, 8/29/19 a/w RLE cellulitis    (1)Renal - CKD 2-3; minimal proteinuria - early diabetic nephropathy  (2) Mikey DD pre-renal azotemia 2ry to decreased effective circulatory volume vs obstructive uropathy- New UA showing + protein and + RBC:   Creatinine improved   (3) serum Na+ for hyperglycemia =131  (4) s/p NS at 75 cc/hr x 15 hours  (5) urine na <20, U/Creatinine 92  (6) Phos 3.0,     RECOMMENDATIONS:  - a/w Flomax   - strict I/Os, daily weight  - BMP daily  - Renal dosing of meds to cr cl of 30-35 ml/min

## 2019-09-07 NOTE — PROGRESS NOTE ADULT - SUBJECTIVE AND OBJECTIVE BOX
Patient is a 64y old  Male who presents with a chief complaint of Cellulitis of right lower extremity (07 Sep 2019 19:58)                                                               INTERVAL HPI/OVERNIGHT EVENTS:    REVIEW OF SYSTEMS:     CONSTITUTIONAL: No weakness, fevers or chills  EYES/ENT: No visual changes , no ear ache   NECK: No pain or stiffness  RESPIRATORY: No cough, wheezing,  No shortness of breath  CARDIOVASCULAR: No chest pain or palpitations  GASTROINTESTINAL: No abdominal pain  . No nausea, vomiting, or hematemesis; No diarrhea or constipation. No melena or hematochezia.  GENITOURINARY: No dysuria, frequency or hematuria  NEUROLOGICAL: No numbness or weakness  SKIN: No itching, burning, rashes, or lesions                                                                                                                                                                                                                                                                                 Medications:  MEDICATIONS  (STANDING):  amLODIPine   Tablet 5 milliGRAM(s) Oral daily  aspirin enteric coated 81 milliGRAM(s) Oral daily  atorvastatin 80 milliGRAM(s) Oral at bedtime  benzonatate 100 milliGRAM(s) Oral three times a day  dextrose 5%. 1000 milliLiter(s) (50 mL/Hr) IV Continuous <Continuous>  dextrose 50% Injectable 12.5 Gram(s) IV Push once  dextrose 50% Injectable 25 Gram(s) IV Push once  dextrose 50% Injectable 25 Gram(s) IV Push once  diltiazem    milliGRAM(s) Oral daily  insulin glargine Injectable (LANTUS) 32 Unit(s) SubCutaneous at bedtime  insulin lispro (HumaLOG) corrective regimen sliding scale   SubCutaneous three times a day before meals  insulin lispro (HumaLOG) corrective regimen sliding scale   SubCutaneous at bedtime  insulin lispro Injectable (HumaLOG) 8 Unit(s) SubCutaneous three times a day before meals  metoprolol succinate ER 50 milliGRAM(s) Oral two times a day  rivaroxaban 15 milliGRAM(s) Oral with dinner  sodium chloride 0.9%. 1000 milliLiter(s) (75 mL/Hr) IV Continuous <Continuous>  tamsulosin 0.4 milliGRAM(s) Oral at bedtime    MEDICATIONS  (PRN):  acetaminophen   Tablet .. 650 milliGRAM(s) Oral every 6 hours PRN Temp greater or equal to 38C (100.4F)  acetaminophen   Tablet .. 650 milliGRAM(s) Oral every 6 hours PRN Mild Pain (1 - 3)  benzocaine 15 mG/menthol 3.6 mG (Sugar-Free) Lozenge 1 Lozenge Oral four times a day PRN Sore Throat  dextrose 40% Gel 15 Gram(s) Oral once PRN Blood Glucose LESS THAN 70 milliGRAM(s)/deciliter  glucagon  Injectable 1 milliGRAM(s) IntraMuscular once PRN Glucose LESS THAN 70 milligrams/deciliter  guaiFENesin   Syrup  (Sugar-Free) 100 milliGRAM(s) Oral every 6 hours PRN Cough  oxyCODONE    5 mG/acetaminophen 325 mG 2 Tablet(s) Oral every 6 hours PRN Severe Pain (7 - 10)  traMADol 25 milliGRAM(s) Oral every 6 hours PRN Moderate Pain (4 - 6)       Allergies    No Known Allergies    Intolerances      Vital Signs Last 24 Hrs  T(C): 37.5 (07 Sep 2019 20:22), Max: 37.5 (07 Sep 2019 20:22)  T(F): 99.5 (07 Sep 2019 20:22), Max: 99.5 (07 Sep 2019 20:22)  HR: 76 (07 Sep 2019 20:22) (76 - 112)  BP: 139/70 (07 Sep 2019 20:22) (119/73 - 152/80)  BP(mean): --  RR: 18 (07 Sep 2019 20:22) (18 - 18)  SpO2: 98% (07 Sep 2019 20:22) (95% - 99%)  CAPILLARY BLOOD GLUCOSE      POCT Blood Glucose.: 249 mg/dL (07 Sep 2019 21:26)  POCT Blood Glucose.: 135 mg/dL (07 Sep 2019 17:11)  POCT Blood Glucose.: 228 mg/dL (07 Sep 2019 12:09)  POCT Blood Glucose.: 203 mg/dL (07 Sep 2019 07:59)      09-06 @ 07:01  -  09-07 @ 07:00  --------------------------------------------------------  IN: 1880 mL / OUT: 1610 mL / NET: 270 mL    09-07 @ 07:01  -  09-07 @ 22:15  --------------------------------------------------------  IN: 240 mL / OUT: 650 mL / NET: -410 mL      Physical Exam:    Daily     Daily   General:  Well appearing, NAD, not cachetic  HEENT:  Nonicteric, PERRLA  CV:  RRR, S1S2   Lungs:  CTA B/L, no wheezes, rales, rhonchi  Abdomen:  Soft, non-tender, no distended, positive BS  Extremities:  2+ pulses, no c/c, no edema  Skin:  Warm and dry, no rashes  :  No moreno  Neuro:  AAOx3, non-focal, grossly intact                                                                                                                                                                                                                                                                                                LABS:                               9.8    15.0  )-----------( 378      ( 07 Sep 2019 18:10 )             29.2                      09-07    129<L>  |  92<L>  |  28<H>  ----------------------------<  217<H>  4.3   |  22  |  1.49<H>    Ca    9.4      07 Sep 2019 07:03  Phos  3.0     09-07  Mg     2.2     09-07                         RADIOLOGY & ADDITIONAL TESTS         I personally reviewed: [  ]EKG   [  ]CXR    [  ] CT      A/P:         Discussed with :     Derek consultants' Notes   Time spent : Patient is a 64y old  Male who presents with a chief complaint of Cellulitis of right lower extremity (07 Sep 2019 19:58)                                                               INTERVAL HPI/OVERNIGHT EVENTS:    REVIEW OF SYSTEMS:     CONSTITUTIONAL: No weakness, fevers or chills  EYES/ENT: No visual changes , no ear ache   NECK: No pain or stiffness  RESPIRATORY: No cough, wheezing,  No shortness of breath  CARDIOVASCULAR: No chest pain or palpitations  GASTROINTESTINAL: No abdominal pain  . No nausea, vomiting, or hematemesis; No diarrhea or constipation. No melena or hematochezia.  GENITOURINARY: No dysuria, frequency or hematuria  NEUROLOGICAL: No numbness or weakness  scrtoal /penile swelling                                                                                                                                                                                                                                                                               Medications:  MEDICATIONS  (STANDING):  amLODIPine   Tablet 5 milliGRAM(s) Oral daily  aspirin enteric coated 81 milliGRAM(s) Oral daily  atorvastatin 80 milliGRAM(s) Oral at bedtime  benzonatate 100 milliGRAM(s) Oral three times a day  dextrose 5%. 1000 milliLiter(s) (50 mL/Hr) IV Continuous <Continuous>  dextrose 50% Injectable 12.5 Gram(s) IV Push once  dextrose 50% Injectable 25 Gram(s) IV Push once  dextrose 50% Injectable 25 Gram(s) IV Push once  diltiazem    milliGRAM(s) Oral daily  insulin glargine Injectable (LANTUS) 32 Unit(s) SubCutaneous at bedtime  insulin lispro (HumaLOG) corrective regimen sliding scale   SubCutaneous three times a day before meals  insulin lispro (HumaLOG) corrective regimen sliding scale   SubCutaneous at bedtime  insulin lispro Injectable (HumaLOG) 8 Unit(s) SubCutaneous three times a day before meals  metoprolol succinate ER 50 milliGRAM(s) Oral two times a day  rivaroxaban 15 milliGRAM(s) Oral with dinner  sodium chloride 0.9%. 1000 milliLiter(s) (75 mL/Hr) IV Continuous <Continuous>  tamsulosin 0.4 milliGRAM(s) Oral at bedtime    MEDICATIONS  (PRN):  acetaminophen   Tablet .. 650 milliGRAM(s) Oral every 6 hours PRN Temp greater or equal to 38C (100.4F)  acetaminophen   Tablet .. 650 milliGRAM(s) Oral every 6 hours PRN Mild Pain (1 - 3)  benzocaine 15 mG/menthol 3.6 mG (Sugar-Free) Lozenge 1 Lozenge Oral four times a day PRN Sore Throat  dextrose 40% Gel 15 Gram(s) Oral once PRN Blood Glucose LESS THAN 70 milliGRAM(s)/deciliter  glucagon  Injectable 1 milliGRAM(s) IntraMuscular once PRN Glucose LESS THAN 70 milligrams/deciliter  guaiFENesin   Syrup  (Sugar-Free) 100 milliGRAM(s) Oral every 6 hours PRN Cough  oxyCODONE    5 mG/acetaminophen 325 mG 2 Tablet(s) Oral every 6 hours PRN Severe Pain (7 - 10)  traMADol 25 milliGRAM(s) Oral every 6 hours PRN Moderate Pain (4 - 6)       Allergies    No Known Allergies    Intolerances      Vital Signs Last 24 Hrs  T(C): 37.5 (07 Sep 2019 20:22), Max: 37.5 (07 Sep 2019 20:22)  T(F): 99.5 (07 Sep 2019 20:22), Max: 99.5 (07 Sep 2019 20:22)  HR: 76 (07 Sep 2019 20:22) (76 - 112)  BP: 139/70 (07 Sep 2019 20:22) (119/73 - 152/80)  BP(mean): --  RR: 18 (07 Sep 2019 20:22) (18 - 18)  SpO2: 98% (07 Sep 2019 20:22) (95% - 99%)  CAPILLARY BLOOD GLUCOSE      POCT Blood Glucose.: 249 mg/dL (07 Sep 2019 21:26)  POCT Blood Glucose.: 135 mg/dL (07 Sep 2019 17:11)  POCT Blood Glucose.: 228 mg/dL (07 Sep 2019 12:09)  POCT Blood Glucose.: 203 mg/dL (07 Sep 2019 07:59)      09-06 @ 07:01 - 09-07 @ 07:00  --------------------------------------------------------  IN: 1880 mL / OUT: 1610 mL / NET: 270 mL    09-07 @ 07:01 - 09-07 @ 22:15  --------------------------------------------------------  IN: 240 mL / OUT: 650 mL / NET: -410 mL      Physical Exam:    Daily     Daily   General:  NAD   HEENT:  Nonicteric, PERRLA  CV:  RRR, S1S2   Lungs:  CTA B  Abdomen:  Soft, non-tender, no distended, positive BS  Extremities:  2+ pulses, no c/c, no edema  Skin:  Warm and dry, no rashes  :  No moreno  Neuro:  AAOx3, non-focal, grossly intact          scrotal swelling :  mild   ecchymosis                                                                                                                                                                                                                                                                                         LABS:                               9.8    15.0  )-----------( 378      ( 07 Sep 2019 18:10 )             29.2                      09-07    129<L>  |  92<L>  |  28<H>  ----------------------------<  217<H>  4.3   |  22  |  1.49<H>    Ca    9.4      07 Sep 2019 07:03  Phos  3.0     09-07  Mg     2.2     09-07

## 2019-09-07 NOTE — PROGRESS NOTE ADULT - ASSESSMENT
64 year old male with a history of CAD S/P multiple stents, chronic AF  presents with fever, erythema swelling and pain of the right foot.  He also has HTN, DM, peripheral neuropathy.  The degree of erythema and swelling is much improved.   In the office it was quite red over the medial malleolus. Left thigh erythema present ? fascial plane hemorrhage from severe cramp. Given h/o spontaneous hematomas - this one being smaller and CHADS VASC 3  - would retain AC with Xarelto.   - cont ABX  - cont Xarelto for thromboembolism prophylaxis. Has persistent af and "smoke" in [place] on YVES  - Continue aspirin. Had stents > 1 yr ago   - cont metoprolol  -Waiting 4 weeks prior to another YVES CV attempt  - Consider Watchman device after YVES CV  The patient is stable from a cardiovascular perspective.

## 2019-09-07 NOTE — PROGRESS NOTE ADULT - SUBJECTIVE AND OBJECTIVE BOX
CC: f/u for low grade fever and leukocytosis    Patient reports: he is stable, no change in left thigh discomfort or swelling.    REVIEW OF SYSTEMS:  All other review of systems negative (Comprehensive ROS)    Antimicrobials Day #  :    Other Medications Reviewed    T(F): 98.9 (19 @ 04:21), Max: 99.4 (19 @ 23:09)  HR: 108 (19 @ 04:21)  BP: 129/67 (19 @ 04:21)  RR: 18 (19 @ 04:21)  SpO2: 96% (19 @ 04:21)  Wt(kg): --    PHYSICAL EXAM:  General: alert, no acute distress  Eyes:  anicteric, no conjunctival injection, no discharge  Oropharynx: no lesions or injection 	  Neck: supple, without adenopathy  Lungs: clear to auscultation  Heart: irregular rate and rhythm; no murmur, rubs or gallops  Abdomen: soft, nondistended, nontender, without mass or organomegaly  Skin: no lesions  Extremities: no clubbing, cyanosis,+ edema left thigh  Neurologic: alert, oriented, moves all extremities    LAB RESULTS:                        9.8    12.1  )-----------( 350      ( 07 Sep 2019 07:03 )             30.3         129<L>  |  92<L>  |  28<H>  ----------------------------<  217<H>  4.3   |  22  |  1.49<H>    Ca    9.4      07 Sep 2019 07:03  Phos  3.0       Mg     2.2             Urinalysis Basic - ( 05 Sep 2019 18:46 )    Color: Yellow / Appearance: Clear / S.025 / pH: x  Gluc: x / Ketone: Negative  / Bili: Negative / Urobili: <2 mg/dL   Blood: x / Protein: 30 mg/dL / Nitrite: Negative   Leuk Esterase: Negative / RBC: 50 /HPF / WBC 3 /HPF   Sq Epi: x / Non Sq Epi: 2 /HPF / Bacteria: Negative      MICROBIOLOGY:  RECENT CULTURES:      RADIOLOGY REVIEWED:    < from: CT Lower Extremity No Cont, Bilateral (19 @ 15:28) >  IMPRESSION: Diffuse subcutaneous soft tissue infiltration/edema of the   left lower extremity. There is also multifocal myofascial enlargement,   heterogeneity and stranding in the left proximal thigh, with involvement   of the left iliopsoas, the left rectus femoris, the proximal left vastus   muscles and the left adductor muscles, likely related to myofascial edema   and hematoma.   No well-formed/drainable collections are demonstrated,   however, there is limited evaluation for well-formed collection on   noncontrast CT. MRI with and without contrast can be performed to further   evaluate if indicated.

## 2019-09-07 NOTE — PROGRESS NOTE ADULT - SUBJECTIVE AND OBJECTIVE BOX
Vascular Surgery Progress Note     Subjective/24hour Events:   Patient seen and examined.   Reports that swelling and ecchymosis significantly improved.   Hemoglobin and hematocrit stable.     Vital Signs:  Vital Signs Last 24 Hrs  T(C): 37.2 (07 Sep 2019 04:21), Max: 37.4 (06 Sep 2019 23:09)  T(F): 98.9 (07 Sep 2019 04:21), Max: 99.4 (06 Sep 2019 23:09)  HR: 108 (07 Sep 2019 04:21) (99 - 108)  BP: 129/67 (07 Sep 2019 04:21) (123/76 - 139/63)  BP(mean): --  RR: 18 (07 Sep 2019 04:21) (18 - 18)  SpO2: 96% (07 Sep 2019 04:21) (96% - 98%)    CAPILLARY BLOOD GLUCOSE      POCT Blood Glucose.: 203 mg/dL (07 Sep 2019 07:59)  POCT Blood Glucose.: 195 mg/dL (06 Sep 2019 22:10)  POCT Blood Glucose.: 169 mg/dL (06 Sep 2019 21:25)  POCT Blood Glucose.: 130 mg/dL (06 Sep 2019 16:29)  POCT Blood Glucose.: 410 mg/dL (06 Sep 2019 12:18)  POCT Blood Glucose.: 419 mg/dL (06 Sep 2019 11:52)      I&O's Detail    06 Sep 2019 07:01  -  07 Sep 2019 07:00  --------------------------------------------------------  IN:    Oral Fluid: 1130 mL    sodium chloride 0.9%.: 750 mL  Total IN: 1880 mL    OUT:    Voided: 1610 mL  Total OUT: 1610 mL    Total NET: 270 mL          MEDICATIONS  (STANDING):  amLODIPine   Tablet 5 milliGRAM(s) Oral daily  aspirin enteric coated 81 milliGRAM(s) Oral daily  atorvastatin 80 milliGRAM(s) Oral at bedtime  benzonatate 100 milliGRAM(s) Oral three times a day  dextrose 5%. 1000 milliLiter(s) (50 mL/Hr) IV Continuous <Continuous>  dextrose 50% Injectable 12.5 Gram(s) IV Push once  dextrose 50% Injectable 25 Gram(s) IV Push once  dextrose 50% Injectable 25 Gram(s) IV Push once  diltiazem    milliGRAM(s) Oral daily  insulin glargine Injectable (LANTUS) 32 Unit(s) SubCutaneous at bedtime  insulin lispro (HumaLOG) corrective regimen sliding scale   SubCutaneous three times a day before meals  insulin lispro (HumaLOG) corrective regimen sliding scale   SubCutaneous at bedtime  insulin lispro Injectable (HumaLOG) 8 Unit(s) SubCutaneous three times a day before meals  metoprolol succinate ER 50 milliGRAM(s) Oral two times a day  rivaroxaban 15 milliGRAM(s) Oral with dinner  sodium chloride 0.9%. 1000 milliLiter(s) (75 mL/Hr) IV Continuous <Continuous>  tamsulosin 0.4 milliGRAM(s) Oral at bedtime    MEDICATIONS  (PRN):  acetaminophen   Tablet .. 650 milliGRAM(s) Oral every 6 hours PRN Temp greater or equal to 38C (100.4F)  acetaminophen   Tablet .. 650 milliGRAM(s) Oral every 6 hours PRN Mild Pain (1 - 3)  benzocaine 15 mG/menthol 3.6 mG (Sugar-Free) Lozenge 1 Lozenge Oral four times a day PRN Sore Throat  dextrose 40% Gel 15 Gram(s) Oral once PRN Blood Glucose LESS THAN 70 milliGRAM(s)/deciliter  glucagon  Injectable 1 milliGRAM(s) IntraMuscular once PRN Glucose LESS THAN 70 milligrams/deciliter  guaiFENesin   Syrup  (Sugar-Free) 100 milliGRAM(s) Oral every 6 hours PRN Cough  oxyCODONE    5 mG/acetaminophen 325 mG 2 Tablet(s) Oral every 6 hours PRN Severe Pain (7 - 10)  traMADol 25 milliGRAM(s) Oral every 6 hours PRN Moderate Pain (4 - 6)      Physical Exam:  Gen: NAD.  Lungs: Non labored breathing.   Ext: Leg re-wrapped from foot up. Swelling present. B/l DP pulses palpable, b/l femoral pulses palpable. Moves all 4 spontaneously.     Labs:    09-07    129<L>  |  92<L>  |  28<H>  ----------------------------<  217<H>  4.3   |  22  |  1.49<H>    Ca    9.4      07 Sep 2019 07:03  Phos  3.0     09-07  Mg     2.2     09-07                              9.8    12.1  )-----------( 350      ( 07 Sep 2019 07:03 )             30.3         Imaging:  < from: CT Lower Extremity No Cont, Bilateral (09.05.19 @ 15:28) >  FINDINGS: No acute fracture or dislocation is demonstrated. There is   moderate bilateral hip osteoarthritis. There is diffuse subcutaneous soft   tissue infiltration of the left lower extremity. There is also multifocal   myofascial enlargement, heterogeneity and stranding in the proximal left   thigh, with involvement of the left iliopsoas, the left rectus femoris,   the proximal left vastus muscles and the left adductor muscles suggesting   myofascial and hematoma. No well-formed/drainable collections are   demonstrated, however, There is limited evaluation for well-formed   collection without contrast.     IMPRESSION: Diffuse subcutaneous soft tissue infiltration/edema of the   left lower extremity. There is also multifocal myofascial enlargement,   heterogeneity and stranding in the left proximal thigh, with involvement   of the left iliopsoas, the left rectus femoris, the proximal left vastus   muscles and the left adductor muscles, likely related to myofascial edema   and hematoma.   No well-formed/drainable collections are demonstrated,   however, there is limited evaluation for well-formed collection on   noncontrast CT. MRI with and without contrast can be performed to further   evaluate if indicated.    < end of copied text >

## 2019-09-07 NOTE — PROGRESS NOTE ADULT - ASSESSMENT
63 yo M, DM, CAD, Afib, here briefly for cough, then ED 8/26 for R ankle pain, swelling, redness, given Keflex.    Some improvement, but then low grade fever and returned 8/29    Afebrile here, WBC on admission minimally elevated.  R ankle with resolved  edema; no erythema.    Presentation atypical for cellulitis, no portal of entry.  Prompt resolution of erythema.  Prominent pain, would be more concerned abt crystal dz, ?primary arthritis, ?gout vs pseudogout, ?induced by diuretic use  Still no erythema, edema improved, pain resolved  L thigh ecchymosis, likely hematoma, on anticoagulation, no signs of secondary infection.  Rapid increase in wbc noted, puzzling, no other support for infection.  Awaiting ablation for A Fib  ? stress leukocytosis , it is moderating  Ultrasound and CT of left leg without any defined collection  Cephalexin stopped 9/4  Suspect low grade temp is inflammatory to left thigh process  CT confirms a hematoma  Plan:  observe off antibiotics  Follow left leg exam  Follow wbc and diff's.  Anticoagulation per  cardiology  No additional ID w/u planned, we will stop actively following.Please call if additional ID issues arise

## 2019-09-08 LAB
ALBUMIN SERPL ELPH-MCNC: 3.1 G/DL — LOW (ref 3.3–5)
ALP SERPL-CCNC: 192 U/L — HIGH (ref 40–120)
ALT FLD-CCNC: 138 U/L — HIGH (ref 10–45)
ANION GAP SERPL CALC-SCNC: 12 MMOL/L — SIGNIFICANT CHANGE UP (ref 5–17)
AST SERPL-CCNC: 209 U/L — HIGH (ref 10–40)
BASOPHILS # BLD AUTO: 0.05 K/UL — SIGNIFICANT CHANGE UP (ref 0–0.2)
BASOPHILS NFR BLD AUTO: 0.4 % — SIGNIFICANT CHANGE UP (ref 0–2)
BILIRUB SERPL-MCNC: 2 MG/DL — HIGH (ref 0.2–1.2)
BUN SERPL-MCNC: 28 MG/DL — HIGH (ref 7–23)
CALCIUM SERPL-MCNC: 9 MG/DL — SIGNIFICANT CHANGE UP (ref 8.4–10.5)
CHLORIDE SERPL-SCNC: 94 MMOL/L — LOW (ref 96–108)
CO2 SERPL-SCNC: 22 MMOL/L — SIGNIFICANT CHANGE UP (ref 22–31)
CREAT SERPL-MCNC: 1.5 MG/DL — HIGH (ref 0.5–1.3)
EOSINOPHIL # BLD AUTO: 0.2 K/UL — SIGNIFICANT CHANGE UP (ref 0–0.5)
EOSINOPHIL NFR BLD AUTO: 1.4 % — SIGNIFICANT CHANGE UP (ref 0–6)
GLUCOSE BLDC GLUCOMTR-MCNC: 122 MG/DL — HIGH (ref 70–99)
GLUCOSE BLDC GLUCOMTR-MCNC: 209 MG/DL — HIGH (ref 70–99)
GLUCOSE BLDC GLUCOMTR-MCNC: 209 MG/DL — HIGH (ref 70–99)
GLUCOSE BLDC GLUCOMTR-MCNC: 211 MG/DL — HIGH (ref 70–99)
GLUCOSE BLDC GLUCOMTR-MCNC: 218 MG/DL — HIGH (ref 70–99)
GLUCOSE SERPL-MCNC: 221 MG/DL — HIGH (ref 70–99)
HCT VFR BLD CALC: 27.8 % — LOW (ref 39–50)
HCT VFR BLD CALC: 29.3 % — LOW (ref 39–50)
HGB BLD-MCNC: 9.2 G/DL — LOW (ref 13–17)
HGB BLD-MCNC: 9.8 G/DL — LOW (ref 13–17)
IMM GRANULOCYTES NFR BLD AUTO: 0.5 % — SIGNIFICANT CHANGE UP (ref 0–1.5)
LYMPHOCYTES # BLD AUTO: 1.3 K/UL — SIGNIFICANT CHANGE UP (ref 1–3.3)
LYMPHOCYTES # BLD AUTO: 9.4 % — LOW (ref 13–44)
MCHC RBC-ENTMCNC: 28.7 PG — SIGNIFICANT CHANGE UP (ref 27–34)
MCHC RBC-ENTMCNC: 30.2 PG — SIGNIFICANT CHANGE UP (ref 27–34)
MCHC RBC-ENTMCNC: 33.1 GM/DL — SIGNIFICANT CHANGE UP (ref 32–36)
MCHC RBC-ENTMCNC: 33.5 GM/DL — SIGNIFICANT CHANGE UP (ref 32–36)
MCV RBC AUTO: 86.6 FL — SIGNIFICANT CHANGE UP (ref 80–100)
MCV RBC AUTO: 90 FL — SIGNIFICANT CHANGE UP (ref 80–100)
MONOCYTES # BLD AUTO: 1.31 K/UL — HIGH (ref 0–0.9)
MONOCYTES NFR BLD AUTO: 9.4 % — SIGNIFICANT CHANGE UP (ref 2–14)
NEUTROPHILS # BLD AUTO: 10.95 K/UL — HIGH (ref 1.8–7.4)
NEUTROPHILS NFR BLD AUTO: 78.9 % — HIGH (ref 43–77)
PLATELET # BLD AUTO: 370 K/UL — SIGNIFICANT CHANGE UP (ref 150–400)
PLATELET # BLD AUTO: 424 K/UL — HIGH (ref 150–400)
POTASSIUM SERPL-MCNC: 4.2 MMOL/L — SIGNIFICANT CHANGE UP (ref 3.5–5.3)
POTASSIUM SERPL-SCNC: 4.2 MMOL/L — SIGNIFICANT CHANGE UP (ref 3.5–5.3)
PROT SERPL-MCNC: 6.3 G/DL — SIGNIFICANT CHANGE UP (ref 6–8.3)
RBC # BLD: 3.21 M/UL — LOW (ref 4.2–5.8)
RBC # BLD: 3.25 M/UL — LOW (ref 4.2–5.8)
RBC # FLD: 12.1 % — SIGNIFICANT CHANGE UP (ref 10.3–14.5)
RBC # FLD: 12.3 % — SIGNIFICANT CHANGE UP (ref 10.3–14.5)
SODIUM SERPL-SCNC: 128 MMOL/L — LOW (ref 135–145)
WBC # BLD: 13.88 K/UL — HIGH (ref 3.8–10.5)
WBC # BLD: 16.1 K/UL — HIGH (ref 3.8–10.5)
WBC # FLD AUTO: 13.88 K/UL — HIGH (ref 3.8–10.5)
WBC # FLD AUTO: 16.1 K/UL — HIGH (ref 3.8–10.5)

## 2019-09-08 RX ORDER — FOLIC ACID/VIT B COMPLEX AND C 400 MCG
1 TABLET ORAL
Qty: 0 | Refills: 0 | DISCHARGE

## 2019-09-08 RX ORDER — FUROSEMIDE 40 MG
1 TABLET ORAL
Qty: 0 | Refills: 0 | DISCHARGE

## 2019-09-08 RX ORDER — DOCUSATE SODIUM 100 MG
100 CAPSULE ORAL
Refills: 0 | Status: DISCONTINUED | OUTPATIENT
Start: 2019-09-08 | End: 2019-09-11

## 2019-09-08 RX ORDER — SENNA PLUS 8.6 MG/1
2 TABLET ORAL AT BEDTIME
Refills: 0 | Status: DISCONTINUED | OUTPATIENT
Start: 2019-09-08 | End: 2019-09-11

## 2019-09-08 RX ORDER — INSULIN GLARGINE 100 [IU]/ML
36 INJECTION, SOLUTION SUBCUTANEOUS AT BEDTIME
Refills: 0 | Status: DISCONTINUED | OUTPATIENT
Start: 2019-09-08 | End: 2019-09-09

## 2019-09-08 RX ORDER — RAMIPRIL 5 MG
1 CAPSULE ORAL
Qty: 0 | Refills: 0 | DISCHARGE

## 2019-09-08 RX ORDER — TAMSULOSIN HYDROCHLORIDE 0.4 MG/1
1 CAPSULE ORAL
Qty: 30 | Refills: 0
Start: 2019-09-08 | End: 2019-10-07

## 2019-09-08 RX ORDER — RIVAROXABAN 15 MG-20MG
1 KIT ORAL
Qty: 0 | Refills: 0 | DISCHARGE

## 2019-09-08 RX ORDER — INSULIN LISPRO 100/ML
9 VIAL (ML) SUBCUTANEOUS
Refills: 0 | Status: DISCONTINUED | OUTPATIENT
Start: 2019-09-08 | End: 2019-09-09

## 2019-09-08 RX ORDER — METOPROLOL TARTRATE 50 MG
25 TABLET ORAL ONCE
Refills: 0 | Status: COMPLETED | OUTPATIENT
Start: 2019-09-08 | End: 2019-09-08

## 2019-09-08 RX ORDER — POLYETHYLENE GLYCOL 3350 17 G/17G
17 POWDER, FOR SOLUTION ORAL ONCE
Refills: 0 | Status: COMPLETED | OUTPATIENT
Start: 2019-09-08 | End: 2019-09-08

## 2019-09-08 RX ORDER — DILTIAZEM HCL 120 MG
1 CAPSULE, EXT RELEASE 24 HR ORAL
Qty: 30 | Refills: 0
Start: 2019-09-08 | End: 2019-10-07

## 2019-09-08 RX ORDER — GLIMEPIRIDE 1 MG
1 TABLET ORAL
Qty: 0 | Refills: 0 | DISCHARGE

## 2019-09-08 RX ORDER — DOCUSATE SODIUM 100 MG
1 CAPSULE ORAL
Qty: 60 | Refills: 0
Start: 2019-09-08 | End: 2019-10-07

## 2019-09-08 RX ORDER — PREGABALIN 225 MG/1
0 CAPSULE ORAL
Qty: 0 | Refills: 0 | DISCHARGE

## 2019-09-08 RX ORDER — ASCORBIC ACID 60 MG
1 TABLET,CHEWABLE ORAL
Qty: 0 | Refills: 0 | DISCHARGE

## 2019-09-08 RX ORDER — CHOLECALCIFEROL (VITAMIN D3) 125 MCG
1 CAPSULE ORAL
Qty: 0 | Refills: 0 | DISCHARGE

## 2019-09-08 RX ORDER — ASPIRIN/CALCIUM CARB/MAGNESIUM 324 MG
1 TABLET ORAL
Qty: 30 | Refills: 0
Start: 2019-09-08 | End: 2019-10-07

## 2019-09-08 RX ORDER — RIVAROXABAN 15 MG-20MG
1 KIT ORAL
Qty: 30 | Refills: 0
Start: 2019-09-08 | End: 2019-10-07

## 2019-09-08 RX ORDER — PYRIDOXINE HCL (VITAMIN B6) 100 MG
1 TABLET ORAL
Qty: 0 | Refills: 0 | DISCHARGE

## 2019-09-08 RX ORDER — SENNA PLUS 8.6 MG/1
2 TABLET ORAL
Qty: 60 | Refills: 0
Start: 2019-09-08 | End: 2019-10-07

## 2019-09-08 RX ADMIN — OXYCODONE AND ACETAMINOPHEN 2 TABLET(S): 5; 325 TABLET ORAL at 12:15

## 2019-09-08 RX ADMIN — Medication 50 MILLIGRAM(S): at 05:25

## 2019-09-08 RX ADMIN — Medication 9 UNIT(S): at 17:48

## 2019-09-08 RX ADMIN — ATORVASTATIN CALCIUM 80 MILLIGRAM(S): 80 TABLET, FILM COATED ORAL at 22:21

## 2019-09-08 RX ADMIN — OXYCODONE AND ACETAMINOPHEN 2 TABLET(S): 5; 325 TABLET ORAL at 11:40

## 2019-09-08 RX ADMIN — OXYCODONE AND ACETAMINOPHEN 2 TABLET(S): 5; 325 TABLET ORAL at 22:22

## 2019-09-08 RX ADMIN — Medication 50 MILLIGRAM(S): at 17:49

## 2019-09-08 RX ADMIN — Medication 9 UNIT(S): at 11:41

## 2019-09-08 RX ADMIN — TAMSULOSIN HYDROCHLORIDE 0.4 MILLIGRAM(S): 0.4 CAPSULE ORAL at 22:21

## 2019-09-08 RX ADMIN — Medication 81 MILLIGRAM(S): at 11:19

## 2019-09-08 RX ADMIN — Medication 4: at 08:19

## 2019-09-08 RX ADMIN — OXYCODONE AND ACETAMINOPHEN 2 TABLET(S): 5; 325 TABLET ORAL at 23:22

## 2019-09-08 RX ADMIN — POLYETHYLENE GLYCOL 3350 17 GRAM(S): 17 POWDER, FOR SOLUTION ORAL at 16:10

## 2019-09-08 RX ADMIN — SENNA PLUS 2 TABLET(S): 8.6 TABLET ORAL at 22:22

## 2019-09-08 RX ADMIN — INSULIN GLARGINE 36 UNIT(S): 100 INJECTION, SOLUTION SUBCUTANEOUS at 23:31

## 2019-09-08 RX ADMIN — Medication 8 UNIT(S): at 08:18

## 2019-09-08 RX ADMIN — Medication 4: at 17:49

## 2019-09-08 RX ADMIN — OXYCODONE AND ACETAMINOPHEN 2 TABLET(S): 5; 325 TABLET ORAL at 05:26

## 2019-09-08 RX ADMIN — RIVAROXABAN 15 MILLIGRAM(S): KIT at 17:49

## 2019-09-08 RX ADMIN — Medication 180 MILLIGRAM(S): at 05:25

## 2019-09-08 RX ADMIN — Medication 4: at 11:41

## 2019-09-08 RX ADMIN — AMLODIPINE BESYLATE 5 MILLIGRAM(S): 2.5 TABLET ORAL at 05:25

## 2019-09-08 RX ADMIN — Medication 100 MILLIGRAM(S): at 17:49

## 2019-09-08 RX ADMIN — OXYCODONE AND ACETAMINOPHEN 2 TABLET(S): 5; 325 TABLET ORAL at 06:26

## 2019-09-08 RX ADMIN — Medication 25 MILLIGRAM(S): at 19:57

## 2019-09-08 NOTE — PROVIDER CONTACT NOTE (OTHER) - ACTION/TREATMENT ORDERED:
Administer current Humalog insulin as ordered (6 units pre-meal + 12 units as per sliding scale= 18 units), BMP x 1 now.
Full set of VS , continue to monitor while on tele
Give ordered metoprolol XL 50mg now, am labs as ordered. Continue to monitor, notify provider with any changes/further tele events.
Reevaluate HR is 30mins post his reg dose of metoprolol, will need additional IVP beta blocker if HR sustains, discharge cancelled, continue to monitor closely.
ekg, stat labs, tele
recheck pt in am, cont to monitor pt. Day team informed.

## 2019-09-08 NOTE — PROGRESS NOTE ADULT - SUBJECTIVE AND OBJECTIVE BOX
SUBJECTIVE: The patient denies chest pain, shortness of breath, arm pain or jaw pain, dizziness or palpitations.                     He reports leg discomfort has improved    MEDICATIONS  (STANDING):  amLODIPine   Tablet 5 milliGRAM(s) Oral daily  aspirin enteric coated 81 milliGRAM(s) Oral daily  atorvastatin 80 milliGRAM(s) Oral at bedtime  benzonatate 100 milliGRAM(s) Oral three times a day  dextrose 5%. 1000 milliLiter(s) (50 mL/Hr) IV Continuous <Continuous>  dextrose 50% Injectable 12.5 Gram(s) IV Push once  dextrose 50% Injectable 25 Gram(s) IV Push once  dextrose 50% Injectable 25 Gram(s) IV Push once  diltiazem    milliGRAM(s) Oral daily  docusate sodium 100 milliGRAM(s) Oral two times a day  insulin glargine Injectable (LANTUS) 32 Unit(s) SubCutaneous at bedtime  insulin lispro (HumaLOG) corrective regimen sliding scale   SubCutaneous three times a day before meals  insulin lispro (HumaLOG) corrective regimen sliding scale   SubCutaneous at bedtime  insulin lispro Injectable (HumaLOG) 8 Unit(s) SubCutaneous three times a day before meals  metoprolol succinate ER 50 milliGRAM(s) Oral two times a day  rivaroxaban 15 milliGRAM(s) Oral with dinner  senna 2 Tablet(s) Oral at bedtime  sodium chloride 0.9%. 1000 milliLiter(s) (75 mL/Hr) IV Continuous <Continuous>  tamsulosin 0.4 milliGRAM(s) Oral at bedtime    MEDICATIONS  (PRN):  acetaminophen   Tablet .. 650 milliGRAM(s) Oral every 6 hours PRN Temp greater or equal to 38C (100.4F)  acetaminophen   Tablet .. 650 milliGRAM(s) Oral every 6 hours PRN Mild Pain (1 - 3)  benzocaine 15 mG/menthol 3.6 mG (Sugar-Free) Lozenge 1 Lozenge Oral four times a day PRN Sore Throat  dextrose 40% Gel 15 Gram(s) Oral once PRN Blood Glucose LESS THAN 70 milliGRAM(s)/deciliter  glucagon  Injectable 1 milliGRAM(s) IntraMuscular once PRN Glucose LESS THAN 70 milligrams/deciliter  guaiFENesin   Syrup  (Sugar-Free) 100 milliGRAM(s) Oral every 6 hours PRN Cough  oxyCODONE    5 mG/acetaminophen 325 mG 2 Tablet(s) Oral every 6 hours PRN Severe Pain (7 - 10)  traMADol 25 milliGRAM(s) Oral every 6 hours PRN Moderate Pain (4 - 6)            REVIEW OF SYSTEMS:    CONSTITUTIONAL: No fever, weight loss, or fatigue  EYES: No eye pain, visual disturbances, or discharge  NECK: No pain or stiffness  RESPIRATORY: No cough, wheezing, chills or hemoptysis; No Shortness of Breath  CARDIOVASCULAR: No chest pain, palpitations, dizziness, or leg swelling  GASTROINTESTINAL: No abdominal or epigastric pain. No nausea, vomiting, or hematemesis; No diarrhea or constipation. No melena or hematochezia.  GENITOURINARY: No dysuria, frequency, hematuria, or incontinence  NEUROLOGICAL: No headaches, memory loss, loss of strength, numbness, or tremors  SKIN: No itching, burning, rashes, or lesions   LYMPH Nodes: No enlarged glands  MUSCULOSKELETAL: No joint pain or swelling; No muscle, back, or extremity pain  All other review of systems are negative.  	  [ ] Unable to obtain    PHYSICAL EXAM:  T(F): 98.8 (09-08-19 @ 04:54), Max: 99.5 (09-07-19 @ 20:22)  HR: 102 (09-08-19 @ 04:54) (76 - 112)  BP: 125/65 (09-08-19 @ 04:54) (119/73 - 152/80)  RR: 18 (09-08-19 @ 04:54) (18 - 18)  SpO2: 99% (09-08-19 @ 04:54) (95% - 99%)  Wt(kg): --  ,   I&O's Summary    07 Sep 2019 07:01  -  08 Sep 2019 07:00  --------------------------------------------------------  IN: 240 mL / OUT: 1900 mL / NET: -1660 mL            PHYSICAL EXAM    Appearance: Normal	  HEENT:   Normal oral mucosa, PERRL, EOMI	  NECK: Soft and supple, No LAD, No JVD  Cardiovascular: Regular Rate and Rhythm, Normal S1 S2, No murmurs, No clicks, gallops or rubs  Respiratory: Lungs clear to auscultation	  Gastrointestinal:  Soft, Non-tender, + BS	  Skin: No rashes, No ecchymoses, No cyanosis  Neurologic: Non-focal  Extremities: No clubbing, cyanosis or edema  Vascular: Peripheral pulses palpable 2+ bilaterally    TELEMETRY: 	  AF 70-90    LABS:	 	                                       9.8    15.0  )-----------( 378      ( 07 Sep 2019 18:10 )             29.2               09-08    128<L>  |  94<L>  |  28<H>  ----------------------------<  221<H>  4.2   |  22  |  1.50<H>    Ca    9.0      08 Sep 2019 06:31  Phos  3.0     09-07  Mg     2.2     09-07    TPro  6.3  /  Alb  3.1<L>  /  TBili  2.0<H>  /  DBili  x   /  AST  209<H>  /  ALT  138<H>  /  AlkPhos  192<H>  09-08           CARDIAC MARKERS ( 07 Sep 2019 07:03 )  x     / x     / 164 U/L / x     / x

## 2019-09-08 NOTE — PROVIDER CONTACT NOTE (OTHER) - SITUATION
Patient afib tachycardic up to  on telemetry monitor
Pt had 6 wide complex beats on tele, 1st occurrence.
VP=008
pt HR on tele while using restroom went as high as 159
pt in afib -130s
, repeat .

## 2019-09-08 NOTE — PROVIDER CONTACT NOTE (OTHER) - RECOMMENDATIONS
NP made aware.
Full set of VS , continue to monitor while on tele
Give ordered metoprolol XL 50mg now, am labs as ordered. Continue to monitor, notify provider with any changes/further tele events.
Metoprolol dose was due at 6pm, and was given to patient. Monitor closely, cancel discharge.
cont to monitor pt.
ekg, tele

## 2019-09-08 NOTE — PROVIDER CONTACT NOTE (OTHER) - REASON
Ectopy: WCB
FT=662
Patient afib tachycardic up to 
pt HR on tele while using restroom went as high as 159
pt in afib -130s
FS >400

## 2019-09-08 NOTE — PROVIDER CONTACT NOTE (OTHER) - BACKGROUND
Adm dx. Cellulitis RLE, hx a-fib
Patient admitted for cellulitis of right lower extremity, afib on xarelto and metoprolol. History of HTN, HLD, T2DM.
pt admitted with cellulitis
pt has hx of afib
pt has hx of afib
Pt admitted with dx. RLE cellulitis, hyperglycemia, newly diagnosed A-fib. PMH DM.

## 2019-09-08 NOTE — PROGRESS NOTE ADULT - SUBJECTIVE AND OBJECTIVE BOX
Patient is a 64y old  Male who presents with a chief complaint of Cellulitis (08 Sep 2019 08:17)                                                               INTERVAL HPI/OVERNIGHT EVENTS:    REVIEW OF SYSTEMS:     CONSTITUTIONAL: No weakness, fevers or chills  EYES/ENT: No visual changes , no ear ache   NECK: No pain or stiffness  RESPIRATORY: No cough, wheezing,  No shortness of breath  CARDIOVASCULAR: No chest pain or palpitations  GASTROINTESTINAL: No abdominal pain  . No nausea, vomiting, or hematemesis; No diarrhea or constipation. No melena or hematochezia.  GENITOURINARY: No dysuria, frequency or hematuria  NEUROLOGICAL: No numbness or weakness  scrotal ecchymosis                                                                                                                                                                                                                                                                                Medications:  MEDICATIONS  (STANDING):  amLODIPine   Tablet 5 milliGRAM(s) Oral daily  aspirin enteric coated 81 milliGRAM(s) Oral daily  atorvastatin 80 milliGRAM(s) Oral at bedtime  benzonatate 100 milliGRAM(s) Oral three times a day  dextrose 5%. 1000 milliLiter(s) (50 mL/Hr) IV Continuous <Continuous>  dextrose 50% Injectable 12.5 Gram(s) IV Push once  dextrose 50% Injectable 25 Gram(s) IV Push once  dextrose 50% Injectable 25 Gram(s) IV Push once  diltiazem    milliGRAM(s) Oral daily  docusate sodium 100 milliGRAM(s) Oral two times a day  insulin glargine Injectable (LANTUS) 36 Unit(s) SubCutaneous at bedtime  insulin lispro (HumaLOG) corrective regimen sliding scale   SubCutaneous three times a day before meals  insulin lispro (HumaLOG) corrective regimen sliding scale   SubCutaneous at bedtime  insulin lispro Injectable (HumaLOG) 9 Unit(s) SubCutaneous three times a day before meals  metoprolol succinate ER 50 milliGRAM(s) Oral two times a day  rivaroxaban 15 milliGRAM(s) Oral with dinner  senna 2 Tablet(s) Oral at bedtime  sodium chloride 0.9%. 1000 milliLiter(s) (75 mL/Hr) IV Continuous <Continuous>  tamsulosin 0.4 milliGRAM(s) Oral at bedtime    MEDICATIONS  (PRN):  acetaminophen   Tablet .. 650 milliGRAM(s) Oral every 6 hours PRN Temp greater or equal to 38C (100.4F)  acetaminophen   Tablet .. 650 milliGRAM(s) Oral every 6 hours PRN Mild Pain (1 - 3)  benzocaine 15 mG/menthol 3.6 mG (Sugar-Free) Lozenge 1 Lozenge Oral four times a day PRN Sore Throat  dextrose 40% Gel 15 Gram(s) Oral once PRN Blood Glucose LESS THAN 70 milliGRAM(s)/deciliter  glucagon  Injectable 1 milliGRAM(s) IntraMuscular once PRN Glucose LESS THAN 70 milligrams/deciliter  guaiFENesin   Syrup  (Sugar-Free) 100 milliGRAM(s) Oral every 6 hours PRN Cough  oxyCODONE    5 mG/acetaminophen 325 mG 2 Tablet(s) Oral every 6 hours PRN Severe Pain (7 - 10)  traMADol 25 milliGRAM(s) Oral every 6 hours PRN Moderate Pain (4 - 6)       Allergies    No Known Allergies    Intolerances      Vital Signs Last 24 Hrs  T(C): 37.1 (08 Sep 2019 11:53), Max: 37.5 (07 Sep 2019 20:22)  T(F): 98.7 (08 Sep 2019 11:53), Max: 99.5 (07 Sep 2019 20:22)  HR: 108 (08 Sep 2019 11:53) (76 - 112)  BP: 113/47 (08 Sep 2019 11:53) (113/47 - 152/80)  BP(mean): --  RR: 18 (08 Sep 2019 11:53) (18 - 18)  SpO2: 100% (08 Sep 2019 11:53) (98% - 100%)  CAPILLARY BLOOD GLUCOSE      POCT Blood Glucose.: 211 mg/dL (08 Sep 2019 11:37)  POCT Blood Glucose.: 209 mg/dL (08 Sep 2019 07:40)  POCT Blood Glucose.: 228 mg/dL (07 Sep 2019 22:48)  POCT Blood Glucose.: 249 mg/dL (07 Sep 2019 21:26)  POCT Blood Glucose.: 135 mg/dL (07 Sep 2019 17:11)      09-07 @ 07:01  -  09-08 @ 07:00  --------------------------------------------------------  IN: 240 mL / OUT: 1900 mL / NET: -1660 mL    09-08 @ 07:01 - 09-08 @ 14:45  --------------------------------------------------------  IN: 450 mL / OUT: 0 mL / NET: 450 mL      Physical Exam:    General:  Well appearing, NAD, not cachetic  HEENT:  Nonicteric, PERRLA  CV:  RRR, S1S2   Lungs:  CTA B/L, no wheezes, rales, rhonchi  Abdomen:  Soft, non-tender, no distended, positive BS  Extremities:  2+ pulses, no c/c, no edema  Neuro:  AAOx3, non-focal, grossly intact         ecchymosis of Scrotum                                                                                                                                                                                                                                                                                          LABS:                               9.8    16.1  )-----------( 424      ( 08 Sep 2019 12:55 )             29.3                      09-08    128<L>  |  94<L>  |  28<H>  ----------------------------<  221<H>  4.2   |  22  |  1.50<H>    Ca    9.0      08 Sep 2019 06:31  Phos  3.0     09-07  Mg     2.2     09-07    TPro  6.3  /  Alb  3.1<L>  /  TBili  2.0<H>  /  DBili  x   /  AST  209<H>  /  ALT  138<H>  /  AlkPhos  192<H>  09-08                       RADIOLOGY & ADDITIONAL TESTS         I personally reviewed: [  ]EKG   [  ]CXR    [  ] CT      A/P:         Discussed with :     Derek consultants' Notes   Time spent :
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
## 2019-09-08 NOTE — PROGRESS NOTE ADULT - SUBJECTIVE AND OBJECTIVE BOX
Chief complaint  Patient is a 64y old  Male who presents with a chief complaint of Cellulitis (08 Sep 2019 08:17)   Review of systems  Patient in bed, looks comfortable, no fever, no hypoglycemia.    Labs and Fingersticks  CAPILLARY BLOOD GLUCOSE      POCT Blood Glucose.: 211 mg/dL (08 Sep 2019 11:37)  POCT Blood Glucose.: 209 mg/dL (08 Sep 2019 07:40)  POCT Blood Glucose.: 228 mg/dL (07 Sep 2019 22:48)  POCT Blood Glucose.: 249 mg/dL (07 Sep 2019 21:26)  POCT Blood Glucose.: 135 mg/dL (07 Sep 2019 17:11)      Anion Gap, Serum: 12 (09-08 @ 06:31)  Anion Gap, Serum: 15 (09-07 @ 07:03)      Calcium, Total Serum: 9.0 (09-08 @ 06:31)  Calcium, Total Serum: 9.4 (09-07 @ 07:03)  Albumin, Serum: 3.1 <L> (09-08 @ 06:31)    Alanine Aminotransferase (ALT/SGPT): 138 <H> (09-08 @ 06:31)  Alkaline Phosphatase, Serum: 192 <H> (09-08 @ 06:31)  Aspartate Aminotransferase (AST/SGOT): 209 <H> (09-08 @ 06:31)        09-08    128<L>  |  94<L>  |  28<H>  ----------------------------<  221<H>  4.2   |  22  |  1.50<H>    Ca    9.0      08 Sep 2019 06:31  Phos  3.0     09-07  Mg     2.2     09-07    TPro  6.3  /  Alb  3.1<L>  /  TBili  2.0<H>  /  DBili  x   /  AST  209<H>  /  ALT  138<H>  /  AlkPhos  192<H>  09-08                        9.8    16.1  )-----------( 424      ( 08 Sep 2019 12:55 )             29.3     Medications  MEDICATIONS  (STANDING):  amLODIPine   Tablet 5 milliGRAM(s) Oral daily  aspirin enteric coated 81 milliGRAM(s) Oral daily  atorvastatin 80 milliGRAM(s) Oral at bedtime  benzonatate 100 milliGRAM(s) Oral three times a day  dextrose 5%. 1000 milliLiter(s) (50 mL/Hr) IV Continuous <Continuous>  dextrose 50% Injectable 12.5 Gram(s) IV Push once  dextrose 50% Injectable 25 Gram(s) IV Push once  dextrose 50% Injectable 25 Gram(s) IV Push once  diltiazem    milliGRAM(s) Oral daily  docusate sodium 100 milliGRAM(s) Oral two times a day  insulin glargine Injectable (LANTUS) 36 Unit(s) SubCutaneous at bedtime  insulin lispro (HumaLOG) corrective regimen sliding scale   SubCutaneous three times a day before meals  insulin lispro (HumaLOG) corrective regimen sliding scale   SubCutaneous at bedtime  insulin lispro Injectable (HumaLOG) 9 Unit(s) SubCutaneous three times a day before meals  metoprolol succinate ER 50 milliGRAM(s) Oral two times a day  rivaroxaban 15 milliGRAM(s) Oral with dinner  senna 2 Tablet(s) Oral at bedtime  sodium chloride 0.9%. 1000 milliLiter(s) (75 mL/Hr) IV Continuous <Continuous>  tamsulosin 0.4 milliGRAM(s) Oral at bedtime      Physical Exam  General: Patient comfortable in bed  Vital Signs Last 12 Hrs  T(F): 98.7 (09-08-19 @ 11:53), Max: 98.8 (09-08-19 @ 04:54)  HR: 108 (09-08-19 @ 11:53) (102 - 108)  BP: 113/47 (09-08-19 @ 11:53) (113/47 - 125/65)  BP(mean): --  RR: 18 (09-08-19 @ 11:53) (18 - 18)  SpO2: 100% (09-08-19 @ 11:53) (99% - 100%)  Neck: No palpable thyroid nodules.  CVS: S1S2, No murmurs  Respiratory: No wheezing, no crepitations  GI: Abdomen soft, bowel sounds positive  Musculoskeletal:  edema lower extremities.   Skin: No skin rashes, no ecchymosis    Diagnostics Chief complaint  Patient is a 64y old  Male who presents with a chief complaint of Cellulitis (08 Sep 2019 08:17)   Review of systems  Patient in bed, looks comfortable, no fever,  no hypoglycemia.    Labs and Fingersticks  CAPILLARY BLOOD GLUCOSE      POCT Blood Glucose.: 211 mg/dL (08 Sep 2019 11:37)  POCT Blood Glucose.: 209 mg/dL (08 Sep 2019 07:40)  POCT Blood Glucose.: 228 mg/dL (07 Sep 2019 22:48)  POCT Blood Glucose.: 249 mg/dL (07 Sep 2019 21:26)  POCT Blood Glucose.: 135 mg/dL (07 Sep 2019 17:11)      Anion Gap, Serum: 12 (09-08 @ 06:31)  Anion Gap, Serum: 15 (09-07 @ 07:03)      Calcium, Total Serum: 9.0 (09-08 @ 06:31)  Calcium, Total Serum: 9.4 (09-07 @ 07:03)  Albumin, Serum: 3.1 <L> (09-08 @ 06:31)    Alanine Aminotransferase (ALT/SGPT): 138 <H> (09-08 @ 06:31)  Alkaline Phosphatase, Serum: 192 <H> (09-08 @ 06:31)  Aspartate Aminotransferase (AST/SGOT): 209 <H> (09-08 @ 06:31)        09-08    128<L>  |  94<L>  |  28<H>  ----------------------------<  221<H>  4.2   |  22  |  1.50<H>    Ca    9.0      08 Sep 2019 06:31  Phos  3.0     09-07  Mg     2.2     09-07    TPro  6.3  /  Alb  3.1<L>  /  TBili  2.0<H>  /  DBili  x   /  AST  209<H>  /  ALT  138<H>  /  AlkPhos  192<H>  09-08                        9.8    16.1  )-----------( 424      ( 08 Sep 2019 12:55 )             29.3     Medications  MEDICATIONS  (STANDING):  amLODIPine   Tablet 5 milliGRAM(s) Oral daily  aspirin enteric coated 81 milliGRAM(s) Oral daily  atorvastatin 80 milliGRAM(s) Oral at bedtime  benzonatate 100 milliGRAM(s) Oral three times a day  dextrose 5%. 1000 milliLiter(s) (50 mL/Hr) IV Continuous <Continuous>  dextrose 50% Injectable 12.5 Gram(s) IV Push once  dextrose 50% Injectable 25 Gram(s) IV Push once  dextrose 50% Injectable 25 Gram(s) IV Push once  diltiazem    milliGRAM(s) Oral daily  docusate sodium 100 milliGRAM(s) Oral two times a day  insulin glargine Injectable (LANTUS) 36 Unit(s) SubCutaneous at bedtime  insulin lispro (HumaLOG) corrective regimen sliding scale   SubCutaneous three times a day before meals  insulin lispro (HumaLOG) corrective regimen sliding scale   SubCutaneous at bedtime  insulin lispro Injectable (HumaLOG) 9 Unit(s) SubCutaneous three times a day before meals  metoprolol succinate ER 50 milliGRAM(s) Oral two times a day  rivaroxaban 15 milliGRAM(s) Oral with dinner  senna 2 Tablet(s) Oral at bedtime  sodium chloride 0.9%. 1000 milliLiter(s) (75 mL/Hr) IV Continuous <Continuous>  tamsulosin 0.4 milliGRAM(s) Oral at bedtime      Physical Exam  General: Patient comfortable in bed  Vital Signs Last 12 Hrs  T(F): 98.7 (09-08-19 @ 11:53), Max: 98.8 (09-08-19 @ 04:54)  HR: 108 (09-08-19 @ 11:53) (102 - 108)  BP: 113/47 (09-08-19 @ 11:53) (113/47 - 125/65)  BP(mean): --  RR: 18 (09-08-19 @ 11:53) (18 - 18)  SpO2: 100% (09-08-19 @ 11:53) (99% - 100%)  Neck: No palpable thyroid nodules.  CVS: S1S2, No murmurs  Respiratory: No wheezing, no crepitations  GI: Abdomen soft, bowel sounds positive  Musculoskeletal:  edema lower extremities.   Skin: No skin rashes, no ecchymosis    Diagnostics

## 2019-09-08 NOTE — PROVIDER CONTACT NOTE (OTHER) - ASSESSMENT
Pt remains A&O x 4, denies increased thirst/blurred vision/pain/discomfort. Pt denied ingesting any food within the past 1-2 hours.
Patient A&Ox4, was rushing to the bathroom post tap water enema for constipation when he felt urge to defecate, HR up to 170s. After using the bathroom, his heart rate continued to sustain in the 130-150's.   /92 spO2 99% on room air.
Pt in NAD, A&O4 per baseline, VS per flowsheet. States that he was asleep and denies CP, SOB, lightheadedness, N/V. Reviewed labs and rhythm with provider.
pt HR on tele while using restroom went as high as 159, now in bed pt HR at rest is 110-120s
pt in afib -130s
vs as charted

## 2019-09-08 NOTE — PROGRESS NOTE ADULT - SUBJECTIVE AND OBJECTIVE BOX
Patient seen and examined.  He complained of constipation.  Today make less urine per patient (UOP 1.9L/24h)    REVIEW OF SYSTEMS:  As per HPI, otherwise 8 full 10 ROS were unremarkable    MEDICATIONS  (STANDING):  amLODIPine   Tablet 5 milliGRAM(s) Oral daily  aspirin enteric coated 81 milliGRAM(s) Oral daily  atorvastatin 80 milliGRAM(s) Oral at bedtime  benzonatate 100 milliGRAM(s) Oral three times a day  dextrose 5%. 1000 milliLiter(s) (50 mL/Hr) IV Continuous <Continuous>  dextrose 50% Injectable 12.5 Gram(s) IV Push once  dextrose 50% Injectable 25 Gram(s) IV Push once  dextrose 50% Injectable 25 Gram(s) IV Push once  diltiazem    milliGRAM(s) Oral daily  docusate sodium 100 milliGRAM(s) Oral two times a day  insulin glargine Injectable (LANTUS) 36 Unit(s) SubCutaneous at bedtime  insulin lispro (HumaLOG) corrective regimen sliding scale   SubCutaneous three times a day before meals  insulin lispro (HumaLOG) corrective regimen sliding scale   SubCutaneous at bedtime  insulin lispro Injectable (HumaLOG) 9 Unit(s) SubCutaneous three times a day before meals  metoprolol succinate ER 50 milliGRAM(s) Oral two times a day  rivaroxaban 15 milliGRAM(s) Oral with dinner  senna 2 Tablet(s) Oral at bedtime  sodium chloride 0.9%. 1000 milliLiter(s) (75 mL/Hr) IV Continuous <Continuous>  tamsulosin 0.4 milliGRAM(s) Oral at bedtime      VITAL:  T(C): , Max: 37.5 (09-07-19 @ 20:22)  T(F): , Max: 99.5 (09-07-19 @ 20:22)  HR: 146 (09-08-19 @ 17:50)  BP: 136/69 (09-08-19 @ 17:50)  BP(mean): --  RR: 18 (09-08-19 @ 17:50)  SpO2: 99% (09-08-19 @ 17:50)  Wt(kg): --    I and O's:    09-07 @ 07:01  -  09-08 @ 07:00  --------------------------------------------------------  IN: 240 mL / OUT: 1900 mL / NET: -1660 mL    09-08 @ 07:01  -  09-08 @ 18:09  --------------------------------------------------------  IN: 650 mL / OUT: 0 mL / NET: 650 mL          PHYSICAL EXAM:    Constitutional: NAD  Neck: No JVD  Respiratory: CTAB  Cardiovascular: S1 and S2  Gastrointestinal: BS+, soft, NT/ND  Extremities: + edema in LT LE  Neurological: A/O x 3, no focal deficits  Psychiatric: Normal mood, normal affect  : No Earl        LABS:                        9.8    16.1  )-----------( 424      ( 08 Sep 2019 12:55 )             29.3     09-08    128<L>  |  94<L>  |  28<H>  ----------------------------<  221<H>  4.2   |  22  |  1.50<H>    Ca    9.0      08 Sep 2019 06:31  Phos  3.0     09-07  Mg     2.2     09-07    TPro  6.3  /  Alb  3.1<L>  /  TBili  2.0<H>  /  DBili  x   /  AST  209<H>  /  ALT  138<H>  /  AlkPhos  192<H>  09-08

## 2019-09-08 NOTE — CHART NOTE - NSCHARTNOTEFT_GEN_A_CORE
Notified by RN that pt HR went up to 170's on telemetry while having bowel movement after enema tonight. Pt HR then sustained in 140-150s, PM dose of Metoprolol 50mg given. Pt denies any symptoms including palpitations, chest pain, anxiety or diaphoresis. Attending made aware, discharge cancelled and pt will be monitored on telemetry overnight.     Maria Teresa Henry PA-C    Follow Up:  1 hour later, pt HR still at 120's, Metoprolol tartrate 25mg STAT given.   Pt still denies any complaints  Will endorse to night team to follow up.

## 2019-09-08 NOTE — PROGRESS NOTE ADULT - ASSESSMENT
64M with PMHx of HTN, HLD, T2DM, DM neuropathy, afib, and CAD, 8/29/19 a/w RLE cellulitis    (1)Renal - CKD 2-3; minimal proteinuria - early diabetic nephropathy  (2) Mikey DD pre-renal azotemia 2ry to decreased effective circulatory volume vs obstructive uropathy- New UA showing + protein and + RBC:   Creatinine mildly raised today though improved overall  (3) serum Na+ for hyperglycemia =130  (4) s/p NS at 75 cc/hr x 15 hours  (5) urine na <20, U/Creatinine 92  (6) Phos 3.0,     RECOMMENDATIONS:  - a/w Flomax   - strict I/Os, daily weight  -  trend BMP   - Renal dosing of meds to cr cl of 30-35 ml/min

## 2019-09-08 NOTE — PROVIDER CONTACT NOTE (OTHER) - DATE AND TIME:
01-Sep-2019 22:40
02-Sep-2019 02:40
06-Sep-2019 03:40
08-Sep-2019 17:55
30-Aug-2019 02:00
06-Sep-2019 12:20

## 2019-09-08 NOTE — PROGRESS NOTE ADULT - PROBLEM SELECTOR PLAN 1
Will increase Lantus to 36u at bedtime, increase Humalog to 9u before each meal, as well as coverage scale.. Will continue monitoring FS, log, will Follow up.  Patient counseled for compliance with consistent low carb diet.

## 2019-09-08 NOTE — PROGRESS NOTE ADULT - ASSESSMENT
Assessment /PLAN:  DMT2: 64y Male with DM T2 with hyperglycemia, now on basal bolus insulin, FS still elevated and not at target, c/o left leg swelling and pain (primary team is aware), no fever, no hypoglycemia.  Cellulitis Left leg: On antibiotics, stable. Has pain and edema of the legs, positive hematoma. Being worked up, ID FU   CAD: On medications, no chest pain, stable, monitored  HTN: Controlled,  on antihypertensive medications. Assessment /PLAN:  DMT2: 64y Male with DM T2 with hyperglycemia, now on basal bolus insulin, FS still elevated and not at target, c/o  left leg swelling and pain (primary team is aware), no fever, no hypoglycemia.  Cellulitis Left leg: On antibiotics, stable. Has pain and edema of the legs, positive hematoma. Being worked up, ID FU   CAD: On medications, no chest pain, stable, monitored  HTN: Controlled,  on antihypertensive medications.

## 2019-09-09 LAB
ANION GAP SERPL CALC-SCNC: 16 MMOL/L — SIGNIFICANT CHANGE UP (ref 5–17)
BUN SERPL-MCNC: 34 MG/DL — HIGH (ref 7–23)
CALCIUM SERPL-MCNC: 9.8 MG/DL — SIGNIFICANT CHANGE UP (ref 8.4–10.5)
CHLORIDE SERPL-SCNC: 88 MMOL/L — LOW (ref 96–108)
CO2 SERPL-SCNC: 22 MMOL/L — SIGNIFICANT CHANGE UP (ref 22–31)
CREAT SERPL-MCNC: 1.69 MG/DL — HIGH (ref 0.5–1.3)
GLUCOSE BLDC GLUCOMTR-MCNC: 176 MG/DL — HIGH (ref 70–99)
GLUCOSE BLDC GLUCOMTR-MCNC: 206 MG/DL — HIGH (ref 70–99)
GLUCOSE BLDC GLUCOMTR-MCNC: 231 MG/DL — HIGH (ref 70–99)
GLUCOSE BLDC GLUCOMTR-MCNC: 265 MG/DL — HIGH (ref 70–99)
GLUCOSE SERPL-MCNC: 219 MG/DL — HIGH (ref 70–99)
HCT VFR BLD CALC: 29.3 % — LOW (ref 39–50)
HGB BLD-MCNC: 9.5 G/DL — LOW (ref 13–17)
MCHC RBC-ENTMCNC: 28.2 PG — SIGNIFICANT CHANGE UP (ref 27–34)
MCHC RBC-ENTMCNC: 32.4 GM/DL — SIGNIFICANT CHANGE UP (ref 32–36)
MCV RBC AUTO: 86.9 FL — SIGNIFICANT CHANGE UP (ref 80–100)
PLATELET # BLD AUTO: 395 K/UL — SIGNIFICANT CHANGE UP (ref 150–400)
POTASSIUM SERPL-MCNC: 4.6 MMOL/L — SIGNIFICANT CHANGE UP (ref 3.5–5.3)
POTASSIUM SERPL-SCNC: 4.6 MMOL/L — SIGNIFICANT CHANGE UP (ref 3.5–5.3)
RBC # BLD: 3.37 M/UL — LOW (ref 4.2–5.8)
RBC # FLD: 12.8 % — SIGNIFICANT CHANGE UP (ref 10.3–14.5)
SODIUM SERPL-SCNC: 126 MMOL/L — LOW (ref 135–145)
WBC # BLD: 12.27 K/UL — HIGH (ref 3.8–10.5)
WBC # FLD AUTO: 12.27 K/UL — HIGH (ref 3.8–10.5)

## 2019-09-09 PROCEDURE — 93975 VASCULAR STUDY: CPT | Mod: 26

## 2019-09-09 PROCEDURE — 76870 US EXAM SCROTUM: CPT | Mod: 26

## 2019-09-09 RX ORDER — METOPROLOL TARTRATE 50 MG
75 TABLET ORAL
Refills: 0 | Status: DISCONTINUED | OUTPATIENT
Start: 2019-09-09 | End: 2019-09-11

## 2019-09-09 RX ORDER — INSULIN LISPRO 100/ML
10 VIAL (ML) SUBCUTANEOUS
Refills: 0 | Status: DISCONTINUED | OUTPATIENT
Start: 2019-09-09 | End: 2019-09-11

## 2019-09-09 RX ORDER — FUROSEMIDE 40 MG
40 TABLET ORAL ONCE
Refills: 0 | Status: COMPLETED | OUTPATIENT
Start: 2019-09-09 | End: 2019-09-09

## 2019-09-09 RX ORDER — SODIUM CHLORIDE 9 MG/ML
1000 INJECTION INTRAMUSCULAR; INTRAVENOUS; SUBCUTANEOUS
Refills: 0 | Status: DISCONTINUED | OUTPATIENT
Start: 2019-09-09 | End: 2019-09-09

## 2019-09-09 RX ORDER — INSULIN GLARGINE 100 [IU]/ML
40 INJECTION, SOLUTION SUBCUTANEOUS AT BEDTIME
Refills: 0 | Status: DISCONTINUED | OUTPATIENT
Start: 2019-09-09 | End: 2019-09-11

## 2019-09-09 RX ADMIN — AMLODIPINE BESYLATE 5 MILLIGRAM(S): 2.5 TABLET ORAL at 05:16

## 2019-09-09 RX ADMIN — Medication 2: at 21:58

## 2019-09-09 RX ADMIN — ATORVASTATIN CALCIUM 80 MILLIGRAM(S): 80 TABLET, FILM COATED ORAL at 21:58

## 2019-09-09 RX ADMIN — OXYCODONE AND ACETAMINOPHEN 2 TABLET(S): 5; 325 TABLET ORAL at 05:16

## 2019-09-09 RX ADMIN — OXYCODONE AND ACETAMINOPHEN 2 TABLET(S): 5; 325 TABLET ORAL at 21:59

## 2019-09-09 RX ADMIN — OXYCODONE AND ACETAMINOPHEN 2 TABLET(S): 5; 325 TABLET ORAL at 22:30

## 2019-09-09 RX ADMIN — SENNA PLUS 2 TABLET(S): 8.6 TABLET ORAL at 21:58

## 2019-09-09 RX ADMIN — Medication 4: at 08:12

## 2019-09-09 RX ADMIN — Medication 100 MILLIGRAM(S): at 17:06

## 2019-09-09 RX ADMIN — Medication 100 MILLIGRAM(S): at 05:16

## 2019-09-09 RX ADMIN — Medication 50 MILLIGRAM(S): at 05:16

## 2019-09-09 RX ADMIN — Medication 10 UNIT(S): at 17:06

## 2019-09-09 RX ADMIN — Medication 100 MILLIGRAM(S): at 21:58

## 2019-09-09 RX ADMIN — Medication 9 UNIT(S): at 08:12

## 2019-09-09 RX ADMIN — OXYCODONE AND ACETAMINOPHEN 2 TABLET(S): 5; 325 TABLET ORAL at 06:16

## 2019-09-09 RX ADMIN — SODIUM CHLORIDE 75 MILLILITER(S): 9 INJECTION INTRAMUSCULAR; INTRAVENOUS; SUBCUTANEOUS at 14:47

## 2019-09-09 RX ADMIN — INSULIN GLARGINE 40 UNIT(S): 100 INJECTION, SOLUTION SUBCUTANEOUS at 21:58

## 2019-09-09 RX ADMIN — Medication 4: at 12:26

## 2019-09-09 RX ADMIN — Medication 650 MILLIGRAM(S): at 13:08

## 2019-09-09 RX ADMIN — Medication 650 MILLIGRAM(S): at 14:15

## 2019-09-09 RX ADMIN — RIVAROXABAN 15 MILLIGRAM(S): KIT at 17:15

## 2019-09-09 RX ADMIN — Medication 81 MILLIGRAM(S): at 11:37

## 2019-09-09 RX ADMIN — Medication 2: at 17:06

## 2019-09-09 RX ADMIN — Medication 40 MILLIGRAM(S): at 17:07

## 2019-09-09 RX ADMIN — Medication 75 MILLIGRAM(S): at 17:15

## 2019-09-09 RX ADMIN — Medication 180 MILLIGRAM(S): at 05:16

## 2019-09-09 RX ADMIN — Medication 9 UNIT(S): at 12:26

## 2019-09-09 RX ADMIN — TAMSULOSIN HYDROCHLORIDE 0.4 MILLIGRAM(S): 0.4 CAPSULE ORAL at 21:58
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
## 2019-09-09 NOTE — PROGRESS NOTE ADULT - SUBJECTIVE AND OBJECTIVE BOX
SUBJECTIVE: The patient denies chest pain, shortness of breath, arm pain or jaw pain, dizziness or palpitations.                     He reports leg discomfort has improved    MEDICATIONS  (STANDING):  amLODIPine   Tablet 5 milliGRAM(s) Oral daily  aspirin enteric coated 81 milliGRAM(s) Oral daily  atorvastatin 80 milliGRAM(s) Oral at bedtime  benzonatate 100 milliGRAM(s) Oral three times a day  dextrose 5%. 1000 milliLiter(s) (50 mL/Hr) IV Continuous <Continuous>  dextrose 50% Injectable 12.5 Gram(s) IV Push once  dextrose 50% Injectable 25 Gram(s) IV Push once  dextrose 50% Injectable 25 Gram(s) IV Push once  diltiazem    milliGRAM(s) Oral daily  docusate sodium 100 milliGRAM(s) Oral two times a day  insulin glargine Injectable (LANTUS) 32 Unit(s) SubCutaneous at bedtime  insulin lispro (HumaLOG) corrective regimen sliding scale   SubCutaneous three times a day before meals  insulin lispro (HumaLOG) corrective regimen sliding scale   SubCutaneous at bedtime  insulin lispro Injectable (HumaLOG) 8 Unit(s) SubCutaneous three times a day before meals  metoprolol succinate ER 50 milliGRAM(s) Oral two times a day  rivaroxaban 15 milliGRAM(s) Oral with dinner  senna 2 Tablet(s) Oral at bedtime  sodium chloride 0.9%. 1000 milliLiter(s) (75 mL/Hr) IV Continuous <Continuous>  tamsulosin 0.4 milliGRAM(s) Oral at bedtime    MEDICATIONS  (PRN):  acetaminophen   Tablet .. 650 milliGRAM(s) Oral every 6 hours PRN Temp greater or equal to 38C (100.4F)  acetaminophen   Tablet .. 650 milliGRAM(s) Oral every 6 hours PRN Mild Pain (1 - 3)  benzocaine 15 mG/menthol 3.6 mG (Sugar-Free) Lozenge 1 Lozenge Oral four times a day PRN Sore Throat  dextrose 40% Gel 15 Gram(s) Oral once PRN Blood Glucose LESS THAN 70 milliGRAM(s)/deciliter  glucagon  Injectable 1 milliGRAM(s) IntraMuscular once PRN Glucose LESS THAN 70 milligrams/deciliter  guaiFENesin   Syrup  (Sugar-Free) 100 milliGRAM(s) Oral every 6 hours PRN Cough  oxyCODONE    5 mG/acetaminophen 325 mG 2 Tablet(s) Oral every 6 hours PRN Severe Pain (7 - 10)  traMADol 25 milliGRAM(s) Oral every 6 hours PRN Moderate Pain (4 - 6)            REVIEW OF SYSTEMS:    CONSTITUTIONAL: No fever, weight loss, or fatigue  EYES: No eye pain, visual disturbances, or discharge  NECK: No pain or stiffness  RESPIRATORY: No cough, wheezing, chills or hemoptysis; No Shortness of Breath  CARDIOVASCULAR: No chest pain, palpitations, dizziness, or leg swelling  GASTROINTESTINAL: No abdominal or epigastric pain. No nausea, vomiting, or hematemesis; No diarrhea or constipation. No melena or hematochezia.  GENITOURINARY: No dysuria, frequency, hematuria, or incontinence  NEUROLOGICAL: No headaches, memory loss, loss of strength, numbness, or tremors  SKIN: No itching, burning, rashes, or lesions   LYMPH Nodes: No enlarged glands  MUSCULOSKELETAL: No joint pain or swelling; No muscle, back, or extremity pain  All other review of systems are negative.  	  [ ] Unable to obtain    PHYSICAL EXAM:  T(F): 97.9 (09-09-19 @ 04:34), Max: 98.7 (09-08-19 @ 11:53)  HR: 100 (09-09-19 @ 04:34) (95 - 146)  BP: 138/72 (09-09-19 @ 04:34) (113/47 - 147/77)  RR: 18 (09-09-19 @ 04:34) (18 - 18)  SpO2: 98% (09-09-19 @ 04:34) (98% - 100%)  Wt(kg): --  ,   I&O's Summary    08 Sep 2019 07:01  -  09 Sep 2019 07:00  --------------------------------------------------------  IN: 1265 mL / OUT: 630 mL / NET: 635 mL    09 Sep 2019 07:01  -  09 Sep 2019 10:18  --------------------------------------------------------  IN: 0 mL / OUT: 200 mL / NET: -200 mL      PHYSICAL EXAM    Appearance: Normal	  HEENT:   Normal oral mucosa, PERRL, EOMI	  NECK: Soft and supple, No LAD, No JVD  Cardiovascular: Regular Rate and Rhythm, Normal S1 S2, No murmurs, No clicks, gallops or rubs  Respiratory: Lungs clear to auscultation	  Gastrointestinal:  Soft, Non-tender, + BS	  Skin: No rashes, No ecchymoses, No cyanosis  Neurologic: Non-focal  Extremities: No clubbing, cyanosis or edema  Vascular: Peripheral pulses palpable 2+ bilaterally    TELEMETRY: 	  AF 70-90    LABS:	 	                                       9.8    15.0  )-----------( 378      ( 07 Sep 2019 18:10 )             29.2               09-08    128<L>  |  94<L>  |  28<H>  ----------------------------<  221<H>  4.2   |  22  |  1.50<H>    Ca    9.0      08 Sep 2019 06:31  Phos  3.0     09-07  Mg     2.2     09-07    TPro  6.3  /  Alb  3.1<L>  /  TBili  2.0<H>  /  DBili  x   /  AST  209<H>  /  ALT  138<H>  /  AlkPhos  192<H>  09-08           CARDIAC MARKERS ( 07 Sep 2019 07:03 )  x     / x     / 164 U/L / x     / x

## 2019-09-09 NOTE — PROGRESS NOTE ADULT - SUBJECTIVE AND OBJECTIVE BOX
Patient is a 64y old  Male who presents with a chief complaint of Cellulitis (09 Sep 2019 10:16)                                                               INTERVAL HPI/OVERNIGHT EVENTS:    REVIEW OF SYSTEMS:     CONSTITUTIONAL: No weakness, fevers or chills  RESPIRATORY: No cough, wheezing,  No shortness of breath  CARDIOVASCULAR: No chest pain or palpitations  GASTROINTESTINAL: No abdominal pain  . No nausea, vomiting, or hematemesis; No diarrhea or constipation. No melena or hematochezia.  GENITOURINARY: No dysuria, frequency or hematuria  NEUROLOGICAL: No numbness or weakness                                                                                                                                                                                                                                                                                   Medications:  MEDICATIONS  (STANDING):  aspirin enteric coated 81 milliGRAM(s) Oral daily  atorvastatin 80 milliGRAM(s) Oral at bedtime  benzonatate 100 milliGRAM(s) Oral three times a day  dextrose 5%. 1000 milliLiter(s) (50 mL/Hr) IV Continuous <Continuous>  dextrose 50% Injectable 12.5 Gram(s) IV Push once  dextrose 50% Injectable 25 Gram(s) IV Push once  dextrose 50% Injectable 25 Gram(s) IV Push once  diltiazem    milliGRAM(s) Oral daily  docusate sodium 100 milliGRAM(s) Oral two times a day  insulin glargine Injectable (LANTUS) 40 Unit(s) SubCutaneous at bedtime  insulin lispro (HumaLOG) corrective regimen sliding scale   SubCutaneous three times a day before meals  insulin lispro (HumaLOG) corrective regimen sliding scale   SubCutaneous at bedtime  insulin lispro Injectable (HumaLOG) 10 Unit(s) SubCutaneous three times a day before meals  metoprolol succinate ER 75 milliGRAM(s) Oral two times a day  rivaroxaban 15 milliGRAM(s) Oral with dinner  senna 2 Tablet(s) Oral at bedtime  tamsulosin 0.4 milliGRAM(s) Oral at bedtime    MEDICATIONS  (PRN):  acetaminophen   Tablet .. 650 milliGRAM(s) Oral every 6 hours PRN Temp greater or equal to 38C (100.4F)  acetaminophen   Tablet .. 650 milliGRAM(s) Oral every 6 hours PRN Mild Pain (1 - 3)  benzocaine 15 mG/menthol 3.6 mG (Sugar-Free) Lozenge 1 Lozenge Oral four times a day PRN Sore Throat  dextrose 40% Gel 15 Gram(s) Oral once PRN Blood Glucose LESS THAN 70 milliGRAM(s)/deciliter  glucagon  Injectable 1 milliGRAM(s) IntraMuscular once PRN Glucose LESS THAN 70 milligrams/deciliter  guaiFENesin   Syrup  (Sugar-Free) 100 milliGRAM(s) Oral every 6 hours PRN Cough  oxyCODONE    5 mG/acetaminophen 325 mG 2 Tablet(s) Oral every 6 hours PRN Severe Pain (7 - 10)       Allergies    No Known Allergies    Intolerances      Vital Signs Last 24 Hrs  T(C): 37.1 (09 Sep 2019 16:56), Max: 37.1 (09 Sep 2019 16:56)  T(F): 98.7 (09 Sep 2019 16:56), Max: 98.7 (09 Sep 2019 16:56)  HR: 94 (09 Sep 2019 16:56) (94 - 146)  BP: 128/70 (09 Sep 2019 16:56) (128/70 - 147/77)  BP(mean): --  RR: 18 (09 Sep 2019 16:56) (18 - 18)  SpO2: 100% (09 Sep 2019 16:56) (98% - 100%)  CAPILLARY BLOOD GLUCOSE      POCT Blood Glucose.: 176 mg/dL (09 Sep 2019 16:38)  POCT Blood Glucose.: 206 mg/dL (09 Sep 2019 11:52)  POCT Blood Glucose.: 231 mg/dL (09 Sep 2019 07:50)  POCT Blood Glucose.: 209 mg/dL (08 Sep 2019 23:26)  POCT Blood Glucose.: 122 mg/dL (08 Sep 2019 21:38)      09-08 @ 07:01 - 09-09 @ 07:00  --------------------------------------------------------  IN: 1265 mL / OUT: 630 mL / NET: 635 mL    09-09 @ 07:01 - 09-09 @ 17:48  --------------------------------------------------------  IN: 480 mL / OUT: 400 mL / NET: 80 mL      Physical Exam:    General:  Well appearing, NAD, not cachetic  HEENT:  Nonicteric, PERRLA  CV:  RRR, S1S2   Lungs:  CTA B/L, no wheezes, rales, rhonchi  Abdomen:  Soft, non-tender, no distended, positive BS  Extremities: edema  Skin:  Warm and dry, no rashes  :  No moreno  Neuro:  AAOx3, non-focal, grossly intact                                                                                                                                                                                                                                                                                                LABS:                               9.5    12.27 )-----------( 395      ( 09 Sep 2019 10:15 )             29.3                      09-09    126<L>  |  88<L>  |  34<H>  ----------------------------<  219<H>  4.6   |  22  |  1.69<H>    Ca    9.8      09 Sep 2019 07:15    TPro  6.3  /  Alb  3.1<L>  /  TBili  2.0<H>  /  DBili  x   /  AST  209<H>  /  ALT  138<H>  /  AlkPhos  192<H>  09-08

## 2019-09-09 NOTE — PROGRESS NOTE ADULT - SUBJECTIVE AND OBJECTIVE BOX
No pain, no shortness of breath      VITAL:  T(C): , Max: 36.9 (09-08-19 @ 19:56)  T(F): , Max: 98.5 (09-08-19 @ 19:56)  HR: 102 (09-09-19 @ 12:27)  BP: 131/73 (09-09-19 @ 12:27)  RR: 18 (09-09-19 @ 12:27)  SpO2: 98% (09-09-19 @ 12:27)      PHYSICAL EXAM:  Constitutional: NAD; Alert  HEENT:  NCAT; DMM  Neck: No JVD; supple  Respiratory: CTA-b/l  Cardiac: RRR s1s2  Gastrointestinal: BS+, soft, NT/ND  Urologic: No moreno  Extremities: No peripheral edema  Back: No CVAT b/l    LABS:                        9.5    12.27 )-----------( 395      ( 09 Sep 2019 10:15 )             29.3     Na(126)/K(4.6)/Cl(88)/HCO3(22)/BUN(34)/Cr(1.69)Glu(219)/Ca(9.8)/Mg(--)/PO4(--)    09-09 @ 07:15  Na(128)/K(4.2)/Cl(94)/HCO3(22)/BUN(28)/Cr(1.50)Glu(221)/Ca(9.0)/Mg(--)/PO4(--)    09-08 @ 06:31  Na(129)/K(4.3)/Cl(92)/HCO3(22)/BUN(28)/Cr(1.49)Glu(217)/Ca(9.4)/Mg(2.2)/PO4(3.0)    09-07 @ 07:03  Na(124)/K(4.8)/Cl(90)/HCO3(22)/BUN(34)/Cr(1.58)Glu(441)/Ca(9.3)/Mg(--)/PO4(--)    09-06 @ 12:43      ASSESSMENT: 64M with PMHx of HTN, HLD, T2DM, DM neuropathy, afib, and CAD, 8/29/19 a/w RLE cellulitis    (1)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      RECOMMENDATIONS:  (1)NS 75cc/h x 13h  (2)BMP daily          Markell Walton MD  [facility]  [phone] No pain, no shortness of breath      VITAL:  T(C): , Max: 36.9 (09-08-19 @ 19:56)  T(F): , Max: 98.5 (09-08-19 @ 19:56)  HR: 102 (09-09-19 @ 12:27)  BP: 131/73 (09-09-19 @ 12:27)  RR: 18 (09-09-19 @ 12:27)  SpO2: 98% (09-09-19 @ 12:27)      PHYSICAL EXAM:  Constitutional: NAD; Alert  HEENT:  NCAT; DMM  Neck: No JVD; supple  Respiratory: CTA-b/l  Cardiac: RRR s1s2  Gastrointestinal: BS+, soft, NT/ND  Urologic: No moreno; (+)scrotal swelling  Extremities: 1+ b/l LE edema  Back: No CVAT b/l    LABS:                        9.5    12.27 )-----------( 395      ( 09 Sep 2019 10:15 )             29.3     Na(126)/K(4.6)/Cl(88)/HCO3(22)/BUN(34)/Cr(1.69)Glu(219)/Ca(9.8)/Mg(--)/PO4(--)    09-09 @ 07:15  Na(128)/K(4.2)/Cl(94)/HCO3(22)/BUN(28)/Cr(1.50)Glu(221)/Ca(9.0)/Mg(--)/PO4(--)    09-08 @ 06:31  Na(129)/K(4.3)/Cl(92)/HCO3(22)/BUN(28)/Cr(1.49)Glu(217)/Ca(9.4)/Mg(2.2)/PO4(3.0)    09-07 @ 07:03  Na(124)/K(4.8)/Cl(90)/HCO3(22)/BUN(34)/Cr(1.58)Glu(441)/Ca(9.3)/Mg(--)/PO4(--)    09-06 @ 12:43      ASSESSMENT: 64M with PMHx of HTN, HLD, T2DM, DM neuropathy, afib, and CAD, 8/29/19 a/w RLE cellulitis    (1)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      RECOMMENDATIONS:  (1)NS 75cc/h x 13h  (2)BMP daily - if serum sodium trending down by tomorrow, then will opt for diuretics rather than IVF at that point.          Markell Walton MD  [facility]  [phone]

## 2019-09-09 NOTE — PROGRESS NOTE ADULT - SUBJECTIVE AND OBJECTIVE BOX
Chief complaint  Patient is a 64y old  Male who presents with a chief complaint of Cellulitis (09 Sep 2019 10:16)   Review of systems  Patient in bed, looks comfortable, no fever, no hypoglycemia.    Labs and Fingersticks  CAPILLARY BLOOD GLUCOSE      POCT Blood Glucose.: 206 mg/dL (09 Sep 2019 11:52)  POCT Blood Glucose.: 231 mg/dL (09 Sep 2019 07:50)  POCT Blood Glucose.: 209 mg/dL (08 Sep 2019 23:26)  POCT Blood Glucose.: 122 mg/dL (08 Sep 2019 21:38)  POCT Blood Glucose.: 218 mg/dL (08 Sep 2019 16:56)      Anion Gap, Serum: 16 (09-09 @ 07:15)  Anion Gap, Serum: 12 (09-08 @ 06:31)      Calcium, Total Serum: 9.8 (09-09 @ 07:15)  Calcium, Total Serum: 9.0 (09-08 @ 06:31)  Albumin, Serum: 3.1 <L> (09-08 @ 06:31)    Alanine Aminotransferase (ALT/SGPT): 138 <H> (09-08 @ 06:31)  Alkaline Phosphatase, Serum: 192 <H> (09-08 @ 06:31)  Aspartate Aminotransferase (AST/SGOT): 209 <H> (09-08 @ 06:31)        09-09    126<L>  |  88<L>  |  34<H>  ----------------------------<  219<H>  4.6   |  22  |  1.69<H>    Ca    9.8      09 Sep 2019 07:15    TPro  6.3  /  Alb  3.1<L>  /  TBili  2.0<H>  /  DBili  x   /  AST  209<H>  /  ALT  138<H>  /  AlkPhos  192<H>  09-08                        9.5    12.27 )-----------( 395      ( 09 Sep 2019 10:15 )             29.3     Medications  MEDICATIONS  (STANDING):  amLODIPine   Tablet 5 milliGRAM(s) Oral daily  aspirin enteric coated 81 milliGRAM(s) Oral daily  atorvastatin 80 milliGRAM(s) Oral at bedtime  benzonatate 100 milliGRAM(s) Oral three times a day  dextrose 5%. 1000 milliLiter(s) (50 mL/Hr) IV Continuous <Continuous>  dextrose 50% Injectable 12.5 Gram(s) IV Push once  dextrose 50% Injectable 25 Gram(s) IV Push once  dextrose 50% Injectable 25 Gram(s) IV Push once  diltiazem    milliGRAM(s) Oral daily  docusate sodium 100 milliGRAM(s) Oral two times a day  insulin glargine Injectable (LANTUS) 36 Unit(s) SubCutaneous at bedtime  insulin lispro (HumaLOG) corrective regimen sliding scale   SubCutaneous three times a day before meals  insulin lispro (HumaLOG) corrective regimen sliding scale   SubCutaneous at bedtime  insulin lispro Injectable (HumaLOG) 9 Unit(s) SubCutaneous three times a day before meals  metoprolol succinate ER 50 milliGRAM(s) Oral two times a day  rivaroxaban 15 milliGRAM(s) Oral with dinner  senna 2 Tablet(s) Oral at bedtime  sodium chloride 0.9%. 1000 milliLiter(s) (75 mL/Hr) IV Continuous <Continuous>  tamsulosin 0.4 milliGRAM(s) Oral at bedtime      Physical Exam  General: Patient comfortable in bed  Vital Signs Last 12 Hrs  T(F): 97.9 (09-09-19 @ 12:27), Max: 97.9 (09-09-19 @ 04:34)  HR: 102 (09-09-19 @ 12:27) (100 - 102)  BP: 131/73 (09-09-19 @ 12:27) (131/73 - 138/72)  BP(mean): --  RR: 18 (09-09-19 @ 12:27) (18 - 18)  SpO2: 98% (09-09-19 @ 12:27) (98% - 98%)  Neck: No palpable thyroid nodules.  CVS: S1S2, No murmurs  Respiratory: No wheezing, no crepitations  GI: Abdomen soft, bowel sounds positive  Musculoskeletal:  edema lower extremities.   Skin: No skin rashes, no ecchymosis    Diagnostics Chief complaint  Patient is a 64y old  Male who presents with a chief complaint of Cellulitis (09 Sep 2019 10:16)   Review of systems  Patient in bed, looks comfortable, no fever,  no hypoglycemia.    Labs and Fingersticks  CAPILLARY BLOOD GLUCOSE      POCT Blood Glucose.: 206 mg/dL (09 Sep 2019 11:52)  POCT Blood Glucose.: 231 mg/dL (09 Sep 2019 07:50)  POCT Blood Glucose.: 209 mg/dL (08 Sep 2019 23:26)  POCT Blood Glucose.: 122 mg/dL (08 Sep 2019 21:38)  POCT Blood Glucose.: 218 mg/dL (08 Sep 2019 16:56)      Anion Gap, Serum: 16 (09-09 @ 07:15)  Anion Gap, Serum: 12 (09-08 @ 06:31)      Calcium, Total Serum: 9.8 (09-09 @ 07:15)  Calcium, Total Serum: 9.0 (09-08 @ 06:31)  Albumin, Serum: 3.1 <L> (09-08 @ 06:31)    Alanine Aminotransferase (ALT/SGPT): 138 <H> (09-08 @ 06:31)  Alkaline Phosphatase, Serum: 192 <H> (09-08 @ 06:31)  Aspartate Aminotransferase (AST/SGOT): 209 <H> (09-08 @ 06:31)        09-09    126<L>  |  88<L>  |  34<H>  ----------------------------<  219<H>  4.6   |  22  |  1.69<H>    Ca    9.8      09 Sep 2019 07:15    TPro  6.3  /  Alb  3.1<L>  /  TBili  2.0<H>  /  DBili  x   /  AST  209<H>  /  ALT  138<H>  /  AlkPhos  192<H>  09-08                        9.5    12.27 )-----------( 395      ( 09 Sep 2019 10:15 )             29.3     Medications  MEDICATIONS  (STANDING):  amLODIPine   Tablet 5 milliGRAM(s) Oral daily  aspirin enteric coated 81 milliGRAM(s) Oral daily  atorvastatin 80 milliGRAM(s) Oral at bedtime  benzonatate 100 milliGRAM(s) Oral three times a day  dextrose 5%. 1000 milliLiter(s) (50 mL/Hr) IV Continuous <Continuous>  dextrose 50% Injectable 12.5 Gram(s) IV Push once  dextrose 50% Injectable 25 Gram(s) IV Push once  dextrose 50% Injectable 25 Gram(s) IV Push once  diltiazem    milliGRAM(s) Oral daily  docusate sodium 100 milliGRAM(s) Oral two times a day  insulin glargine Injectable (LANTUS) 36 Unit(s) SubCutaneous at bedtime  insulin lispro (HumaLOG) corrective regimen sliding scale   SubCutaneous three times a day before meals  insulin lispro (HumaLOG) corrective regimen sliding scale   SubCutaneous at bedtime  insulin lispro Injectable (HumaLOG) 9 Unit(s) SubCutaneous three times a day before meals  metoprolol succinate ER 50 milliGRAM(s) Oral two times a day  rivaroxaban 15 milliGRAM(s) Oral with dinner  senna 2 Tablet(s) Oral at bedtime  sodium chloride 0.9%. 1000 milliLiter(s) (75 mL/Hr) IV Continuous <Continuous>  tamsulosin 0.4 milliGRAM(s) Oral at bedtime      Physical Exam  General: Patient comfortable in bed  Vital Signs Last 12 Hrs  T(F): 97.9 (09-09-19 @ 12:27), Max: 97.9 (09-09-19 @ 04:34)  HR: 102 (09-09-19 @ 12:27) (100 - 102)  BP: 131/73 (09-09-19 @ 12:27) (131/73 - 138/72)  BP(mean): --  RR: 18 (09-09-19 @ 12:27) (18 - 18)  SpO2: 98% (09-09-19 @ 12:27) (98% - 98%)  Neck: No palpable thyroid nodules.  CVS: S1S2, No murmurs  Respiratory: No wheezing, no crepitations  GI: Abdomen soft, bowel sounds positive  Musculoskeletal:  edema lower extremities.   Skin: No skin rashes, no ecchymosis    Diagnostics

## 2019-09-09 NOTE — PROGRESS NOTE ADULT - ASSESSMENT
Assessment /PLAN:  DMT2: 64y Male with DM T2 with hyperglycemia, s/p increasing patient's insulin yesterday, FS still elevated and not at target, no fever, no hypoglycemia. Patient states he feels better today, eating meals, comfortable.   Cellulitis Left leg: On antibiotics, stable. States the leg pain and edema has improved.  CAD: On medications, no chest pain, stable, monitored  HTN: Controlled,  on antihypertensive medications. Assessment /PLAN:  DMT2: 64y Male with DM T2 with hyperglycemia, s/p increasing patient's insulin yesterday, FS still elevated  and not at target, no fever, no hypoglycemia. Patient states he feels better today, eating meals, comfortable.   Cellulitis Left leg: On antibiotics, stable. States the leg pain and edema has improved.  CAD: On medications, no chest pain, stable, monitored  HTN: Controlled,  on antihypertensive medications.

## 2019-09-09 NOTE — PROGRESS NOTE ADULT - SUBJECTIVE AND OBJECTIVE BOX
Patient is a 64y old  Male who presents with a chief complaint of Cellulitis (09 Sep 2019 10:16)    appetite good.  had a BM post enema.  No N/V or abd pain.  No CP or SOB.  No HA or dizziness.  No bleeding.        Medication:   acetaminophen   Tablet .. 650 milliGRAM(s) Oral every 6 hours PRN  acetaminophen   Tablet .. 650 milliGRAM(s) Oral every 6 hours PRN  aspirin enteric coated 81 milliGRAM(s) Oral daily  atorvastatin 80 milliGRAM(s) Oral at bedtime  benzocaine 15 mG/menthol 3.6 mG (Sugar-Free) Lozenge 1 Lozenge Oral four times a day PRN  benzonatate 100 milliGRAM(s) Oral three times a day  dextrose 40% Gel 15 Gram(s) Oral once PRN  dextrose 5%. 1000 milliLiter(s) IV Continuous <Continuous>  dextrose 50% Injectable 12.5 Gram(s) IV Push once  dextrose 50% Injectable 25 Gram(s) IV Push once  dextrose 50% Injectable 25 Gram(s) IV Push once  diltiazem    milliGRAM(s) Oral daily  docusate sodium 100 milliGRAM(s) Oral two times a day  glucagon  Injectable 1 milliGRAM(s) IntraMuscular once PRN  guaiFENesin   Syrup  (Sugar-Free) 100 milliGRAM(s) Oral every 6 hours PRN  insulin glargine Injectable (LANTUS) 40 Unit(s) SubCutaneous at bedtime  insulin lispro (HumaLOG) corrective regimen sliding scale   SubCutaneous three times a day before meals  insulin lispro (HumaLOG) corrective regimen sliding scale   SubCutaneous at bedtime  insulin lispro Injectable (HumaLOG) 10 Unit(s) SubCutaneous three times a day before meals  metoprolol succinate ER 75 milliGRAM(s) Oral two times a day  oxyCODONE    5 mG/acetaminophen 325 mG 2 Tablet(s) Oral every 6 hours PRN  rivaroxaban 15 milliGRAM(s) Oral with dinner  senna 2 Tablet(s) Oral at bedtime  tamsulosin 0.4 milliGRAM(s) Oral at bedtime      Physical exam    T(C): 37.1 (09-09-19 @ 16:56), Max: 37.1 (09-09-19 @ 16:56)  HR: 94 (09-09-19 @ 16:56) (94 - 122)  BP: 128/70 (09-09-19 @ 16:56) (128/70 - 147/77)  RR: 18 (09-09-19 @ 16:56) (18 - 18)  SpO2: 100% (09-09-19 @ 16:56) (98% - 100%)  Wt(kg): --    alert NAD  EOMI anicteric sclera  Cv s1 S2 RRR  Lungs clear B/L  abd soft NT ND +BS  No RLE edema or tenderness, less left thigh swelling    Labs                              9.5    12.27 )-----------( 395      ( 09 Sep 2019 10:15 )             29.3       09-09    126<L>  |  88<L>  |  34<H>  ----------------------------<  219<H>  4.6   |  22  |  1.69<H>    Ca    9.8      09 Sep 2019 07:15    TPro  6.3  /  Alb  3.1<L>  /  TBili  2.0<H>  /  DBili  x   /  AST  209<H>  /  ALT  138<H>  /  AlkPhos  192<H>  09-08      LIVER FUNCTIONS - ( 08 Sep 2019 06:31 )  Alb: 3.1 g/dL / Pro: 6.3 g/dL / ALK PHOS: 192 U/L / ALT: 138 U/L / AST: 209 U/L / GGT: x             [id number]

## 2019-09-09 NOTE — PROGRESS NOTE ADULT - ASSESSMENT
Anemia AOCd picture kidney disease.  Hgb low but adequate and can monitor for now.  can consider Epo as out-pt if Hgb remains below 10 but expect it to increase after discharge.    TREMAYNE is polyclonal

## 2019-09-09 NOTE — PROGRESS NOTE ADULT - PROBLEM SELECTOR PLAN 1
Will increase current insulin regimen: Will continue monitoring FS, log, will Follow up.  Patient counseled for compliance with consistent low carb diet. Will increase Lantus to 40u at bedtime, increase Humalog to 10u before each meal, continue coverage scale. Will continue monitoring FS, log, will Follow up.  Patient counseled for compliance with consistent low carb diet.

## 2019-09-10 LAB
ANION GAP SERPL CALC-SCNC: 12 MMOL/L — SIGNIFICANT CHANGE UP (ref 5–17)
BUN SERPL-MCNC: 36 MG/DL — HIGH (ref 7–23)
BUN SERPL-MCNC: 37 MG/DL — HIGH (ref 7–23)
CALCIUM SERPL-MCNC: 9 MG/DL — SIGNIFICANT CHANGE UP (ref 8.4–10.5)
CALCIUM SERPL-MCNC: 9.3 MG/DL — SIGNIFICANT CHANGE UP (ref 8.4–10.5)
CHLORIDE SERPL-SCNC: 88 MMOL/L — LOW (ref 96–108)
CHLORIDE SERPL-SCNC: 93 MMOL/L — LOW (ref 96–108)
CO2 SERPL-SCNC: 24 MMOL/L — SIGNIFICANT CHANGE UP (ref 22–31)
CO2 SERPL-SCNC: 24 MMOL/L — SIGNIFICANT CHANGE UP (ref 22–31)
CREAT SERPL-MCNC: 1.69 MG/DL — HIGH (ref 0.5–1.3)
CREAT SERPL-MCNC: 1.72 MG/DL — HIGH (ref 0.5–1.3)
GLUCOSE BLDC GLUCOMTR-MCNC: 102 MG/DL — HIGH (ref 70–99)
GLUCOSE BLDC GLUCOMTR-MCNC: 147 MG/DL — HIGH (ref 70–99)
GLUCOSE BLDC GLUCOMTR-MCNC: 209 MG/DL — HIGH (ref 70–99)
GLUCOSE BLDC GLUCOMTR-MCNC: 292 MG/DL — HIGH (ref 70–99)
GLUCOSE BLDC GLUCOMTR-MCNC: 292 MG/DL — HIGH (ref 70–99)
GLUCOSE BLDC GLUCOMTR-MCNC: 99 MG/DL — SIGNIFICANT CHANGE UP (ref 70–99)
GLUCOSE SERPL-MCNC: 200 MG/DL — HIGH (ref 70–99)
GLUCOSE SERPL-MCNC: 258 MG/DL — HIGH (ref 70–99)
HCT VFR BLD CALC: 21.7 % — LOW (ref 39–50)
HCT VFR BLD CALC: 24.1 % — LOW (ref 39–50)
HCT VFR BLD CALC: 24.5 % — LOW (ref 39–50)
HGB BLD-MCNC: 7.7 G/DL — LOW (ref 13–17)
HGB BLD-MCNC: 8.2 G/DL — LOW (ref 13–17)
HGB BLD-MCNC: 8.3 G/DL — LOW (ref 13–17)
MCHC RBC-ENTMCNC: 30.1 PG — SIGNIFICANT CHANGE UP (ref 27–34)
MCHC RBC-ENTMCNC: 31 PG — SIGNIFICANT CHANGE UP (ref 27–34)
MCHC RBC-ENTMCNC: 31.7 PG — SIGNIFICANT CHANGE UP (ref 27–34)
MCHC RBC-ENTMCNC: 33.4 GM/DL — SIGNIFICANT CHANGE UP (ref 32–36)
MCHC RBC-ENTMCNC: 34.4 GM/DL — SIGNIFICANT CHANGE UP (ref 32–36)
MCHC RBC-ENTMCNC: 35.3 GM/DL — SIGNIFICANT CHANGE UP (ref 32–36)
MCV RBC AUTO: 89.6 FL — SIGNIFICANT CHANGE UP (ref 80–100)
MCV RBC AUTO: 90 FL — SIGNIFICANT CHANGE UP (ref 80–100)
MCV RBC AUTO: 90.1 FL — SIGNIFICANT CHANGE UP (ref 80–100)
NT-PROBNP SERPL-SCNC: 4835 PG/ML — HIGH (ref 0–300)
PLATELET # BLD AUTO: 385 K/UL — SIGNIFICANT CHANGE UP (ref 150–400)
PLATELET # BLD AUTO: 392 K/UL — SIGNIFICANT CHANGE UP (ref 150–400)
PLATELET # BLD AUTO: 393 K/UL — SIGNIFICANT CHANGE UP (ref 150–400)
POTASSIUM SERPL-MCNC: 4.4 MMOL/L — SIGNIFICANT CHANGE UP (ref 3.5–5.3)
POTASSIUM SERPL-MCNC: 4.4 MMOL/L — SIGNIFICANT CHANGE UP (ref 3.5–5.3)
POTASSIUM SERPL-SCNC: 4.4 MMOL/L — SIGNIFICANT CHANGE UP (ref 3.5–5.3)
POTASSIUM SERPL-SCNC: 4.4 MMOL/L — SIGNIFICANT CHANGE UP (ref 3.5–5.3)
RBC # BLD: 2.42 M/UL — LOW (ref 4.2–5.8)
RBC # BLD: 2.67 M/UL — LOW (ref 4.2–5.8)
RBC # BLD: 2.72 M/UL — LOW (ref 4.2–5.8)
RBC # FLD: 12.4 % — SIGNIFICANT CHANGE UP (ref 10.3–14.5)
RBC # FLD: 12.5 % — SIGNIFICANT CHANGE UP (ref 10.3–14.5)
RBC # FLD: 12.6 % — SIGNIFICANT CHANGE UP (ref 10.3–14.5)
SODIUM SERPL-SCNC: 124 MMOL/L — LOW (ref 135–145)
SODIUM SERPL-SCNC: 129 MMOL/L — LOW (ref 135–145)
WBC # BLD: 10.4 K/UL — SIGNIFICANT CHANGE UP (ref 3.8–10.5)
WBC # BLD: 11.9 K/UL — HIGH (ref 3.8–10.5)
WBC # BLD: 13 K/UL — HIGH (ref 3.8–10.5)
WBC # FLD AUTO: 10.4 K/UL — SIGNIFICANT CHANGE UP (ref 3.8–10.5)
WBC # FLD AUTO: 11.9 K/UL — HIGH (ref 3.8–10.5)
WBC # FLD AUTO: 13 K/UL — HIGH (ref 3.8–10.5)

## 2019-09-10 RX ORDER — SODIUM CHLORIDE 9 MG/ML
1000 INJECTION INTRAMUSCULAR; INTRAVENOUS; SUBCUTANEOUS
Refills: 0 | Status: DISCONTINUED | OUTPATIENT
Start: 2019-09-10 | End: 2019-09-10

## 2019-09-10 RX ORDER — TOLVAPTAN 15 MG/1
15 TABLET ORAL ONCE
Refills: 0 | Status: DISCONTINUED | OUTPATIENT
Start: 2019-09-10 | End: 2019-09-10

## 2019-09-10 RX ORDER — FUROSEMIDE 40 MG
40 TABLET ORAL
Refills: 0 | Status: DISCONTINUED | OUTPATIENT
Start: 2019-09-10 | End: 2019-09-11

## 2019-09-10 RX ORDER — INSULIN GLARGINE 100 [IU]/ML
20 INJECTION, SOLUTION SUBCUTANEOUS ONCE
Refills: 0 | Status: COMPLETED | OUTPATIENT
Start: 2019-09-10 | End: 2019-09-10

## 2019-09-10 RX ADMIN — RIVAROXABAN 15 MILLIGRAM(S): KIT at 16:58

## 2019-09-10 RX ADMIN — Medication 40 MILLIGRAM(S): at 17:03

## 2019-09-10 RX ADMIN — OXYCODONE AND ACETAMINOPHEN 2 TABLET(S): 5; 325 TABLET ORAL at 21:09

## 2019-09-10 RX ADMIN — Medication 100 MILLIGRAM(S): at 17:04

## 2019-09-10 RX ADMIN — ATORVASTATIN CALCIUM 80 MILLIGRAM(S): 80 TABLET, FILM COATED ORAL at 21:06

## 2019-09-10 RX ADMIN — Medication 100 MILLIGRAM(S): at 21:06

## 2019-09-10 RX ADMIN — Medication 100 MILLIGRAM(S): at 05:19

## 2019-09-10 RX ADMIN — Medication 180 MILLIGRAM(S): at 05:18

## 2019-09-10 RX ADMIN — SENNA PLUS 2 TABLET(S): 8.6 TABLET ORAL at 21:06

## 2019-09-10 RX ADMIN — Medication 6: at 16:58

## 2019-09-10 RX ADMIN — Medication 10 UNIT(S): at 16:57

## 2019-09-10 RX ADMIN — Medication 4: at 07:51

## 2019-09-10 RX ADMIN — Medication 75 MILLIGRAM(S): at 05:18

## 2019-09-10 RX ADMIN — INSULIN GLARGINE 20 UNIT(S): 100 INJECTION, SOLUTION SUBCUTANEOUS at 23:31

## 2019-09-10 RX ADMIN — Medication 81 MILLIGRAM(S): at 11:21

## 2019-09-10 RX ADMIN — Medication 75 MILLIGRAM(S): at 17:03

## 2019-09-10 RX ADMIN — Medication 10 UNIT(S): at 07:51

## 2019-09-10 RX ADMIN — Medication 6: at 11:47

## 2019-09-10 RX ADMIN — TAMSULOSIN HYDROCHLORIDE 0.4 MILLIGRAM(S): 0.4 CAPSULE ORAL at 21:06

## 2019-09-10 RX ADMIN — Medication 10 UNIT(S): at 11:47

## 2019-09-10 RX ADMIN — OXYCODONE AND ACETAMINOPHEN 2 TABLET(S): 5; 325 TABLET ORAL at 21:39

## 2019-09-10 NOTE — PROGRESS NOTE ADULT - SUBJECTIVE AND OBJECTIVE BOX
KRISTY COYLE  MRN-[id number]    Patient is a 64y old  Male who presents with a chief complaint of Cellulitis (09 Sep 2019 10:16)      Review of System     Comfortable    Current Meds  MEDICATIONS  (STANDING):  aspirin enteric coated 81 milliGRAM(s) Oral daily  atorvastatin 80 milliGRAM(s) Oral at bedtime  benzonatate 100 milliGRAM(s) Oral three times a day  dextrose 5%. 1000 milliLiter(s) (50 mL/Hr) IV Continuous <Continuous>  dextrose 50% Injectable 12.5 Gram(s) IV Push once  dextrose 50% Injectable 25 Gram(s) IV Push once  dextrose 50% Injectable 25 Gram(s) IV Push once  diltiazem    milliGRAM(s) Oral daily  docusate sodium 100 milliGRAM(s) Oral two times a day  insulin glargine Injectable (LANTUS) 40 Unit(s) SubCutaneous at bedtime  insulin lispro (HumaLOG) corrective regimen sliding scale   SubCutaneous three times a day before meals  insulin lispro (HumaLOG) corrective regimen sliding scale   SubCutaneous at bedtime  insulin lispro Injectable (HumaLOG) 10 Unit(s) SubCutaneous three times a day before meals  metoprolol succinate ER 75 milliGRAM(s) Oral two times a day  rivaroxaban 15 milliGRAM(s) Oral with dinner  senna 2 Tablet(s) Oral at bedtime  tamsulosin 0.4 milliGRAM(s) Oral at bedtime    MEDICATIONS  (PRN):  acetaminophen   Tablet .. 650 milliGRAM(s) Oral every 6 hours PRN Temp greater or equal to 38C (100.4F)  acetaminophen   Tablet .. 650 milliGRAM(s) Oral every 6 hours PRN Mild Pain (1 - 3)  benzocaine 15 mG/menthol 3.6 mG (Sugar-Free) Lozenge 1 Lozenge Oral four times a day PRN Sore Throat  dextrose 40% Gel 15 Gram(s) Oral once PRN Blood Glucose LESS THAN 70 milliGRAM(s)/deciliter  glucagon  Injectable 1 milliGRAM(s) IntraMuscular once PRN Glucose LESS THAN 70 milligrams/deciliter  guaiFENesin   Syrup  (Sugar-Free) 100 milliGRAM(s) Oral every 6 hours PRN Cough  oxyCODONE    5 mG/acetaminophen 325 mG 2 Tablet(s) Oral every 6 hours PRN Severe Pain (7 - 10)    Vital Signs Last 24 Hrs  T(C): 36.7 (10 Sep 2019 04:29), Max: 37.2 (09 Sep 2019 22:27)  T(F): 98.1 (10 Sep 2019 04:29), Max: 99 (09 Sep 2019 22:27)  HR: 96 (10 Sep 2019 04:29) (94 - 102)  BP: 126/65 (10 Sep 2019 04:29) (124/69 - 135/75)  BP(mean): --  RR: 18 (10 Sep 2019 04:29) (18 - 18)  SpO2: 98% (10 Sep 2019 04:29) (98% - 100%)    Physical Exam    Constitutional: NAD    Lab  CBC Full  -  ( 10 Sep 2019 06:51 )  WBC Count : 10.4 K/uL  RBC Count : 2.72 M/uL  Hemoglobin : 8.2 g/dL  Hematocrit : 24.5 %  Platelet Count - Automated : 393 K/uL  Mean Cell Volume : 90.1 fl  Mean Cell Hemoglobin : 30.1 pg  Mean Cell Hemoglobin Concentration : 33.4 gm/dL  Auto Neutrophil # : x  Auto Lymphocyte # : x  Auto Monocyte # : x  Auto Eosinophil # : x  Auto Basophil # : x  Auto Neutrophil % : x  Auto Lymphocyte % : x  Auto Monocyte % : x  Auto Eosinophil % : x  Auto Basophil % : x    09-10    129<L>  |  93<L>  |  37<H>  ----------------------------<  200<H>  4.4   |  24  |  1.72<H>    Ca    9.0      10 Sep 2019 06:51          Rad:    Assessment/Plan

## 2019-09-10 NOTE — PROGRESS NOTE ADULT - ASSESSMENT
Assessment /PLAN:  DMT2: 64y Male with DM T2 with hyperglycemia, s/p increasing patient's insulin yesterday, FS still elevated and not at target, no hypoglycemia. Patient noncompliant with diet, eats many boxes of cereal for breakfast. States his leg feels better today though still swollen, ambulating, eating meals, comfortable.   Cellulitis Left leg: On antibiotics, stable. States the leg pain and edema has improved.  CAD: On medications, no chest pain, stable, monitored  HTN: Controlled,  on antihypertensive medications. Assessment /PLAN:  DMT2: 64y Male with DM T2 with hyperglycemia, s/p increasing patient's insulin yesterday, FS still elevated and not at target, no hypoglycemia.  Patient noncompliant with diet, eats many boxes of cereal for breakfast. States his leg feels better today though still swollen, ambulating, eating meals, comfortable.   Cellulitis Left leg: On antibiotics, stable. States the leg pain and edema has improved.  CAD: On medications, no chest pain, stable, monitored  HTN: Controlled,  on antihypertensive medications.

## 2019-09-10 NOTE — PROGRESS NOTE ADULT - ASSESSMENT
64 year old male with a history of CAD S/P multiple stents, chronic AF  presents with fever, erythema swelling and pain of the right foot.  He also has HTN, DM, peripheral neuropathy.  The degree of erythema and swelling is much improved.   AF with a CHADS VASC 3  - would retain AC with Xarelto.   - Hyponatremia being treated with lasix  - cont ABX  - cont Xarelto for thromboembolism prophylaxis. Has persistent af and "smoke" in [place] on YVES  - Continue aspirin. Had stents > 1 yr ago   - cont metoprolol  -Waiting 3 weeks prior to another YVES CV attempt  - Consider Watchman device after YVES CV  The patient is stable from a cardiovascular perspective. 64 year old male with a history of CAD S/P multiple stents, chronic AF  presents with fever, erythema swelling and pain of the right foot.  He also has HTN, DM, peripheral neuropathy.  The degree of erythema and swelling is much improved.   AF with a CHADS VASC 3  - would retain AC with Xarelto.   - Hyponatremia being treated with lasix.  Na increased from 126 to 129.  - Anemia.  Plan as per nephrology  - cont ABX  - cont Xarelto for thromboembolism prophylaxis. Has persistent af and "smoke" in [place] on [place]ES  - Continue aspirin. Had stents > 1 yr ago   - cont metoprolol  -Waiting 3 weeks prior to another YVES CV attempt  - Consider Watchman device after YVES CV  The patient is stable from a cardiovascular perspective.

## 2019-09-10 NOTE — PROGRESS NOTE ADULT - SUBJECTIVE AND OBJECTIVE BOX
Patient is a 64y old  Male who presents with a chief complaint of Cellulitis (10 Sep 2019 08:49)                                                               INTERVAL HPI/OVERNIGHT EVENTS:    REVIEW OF SYSTEMS:     CONSTITUTIONAL: No weakness, fevers or chills  RESPIRATORY: No cough, wheezing,  No shortness of breath  CARDIOVASCULAR: No chest pain or palpitations  GASTROINTESTINAL: No abdominal pain  . No nausea, vomiting, or hematemesis; No diarrhea or constipation. No melena or hematochezia.  GENITOURINARY: No dysuria, frequency or hematuria  NEUROLOGICAL: No numbness or weakness                                                                                                                                                                                                                                                                                 Medications:  MEDICATIONS  (STANDING):  aspirin enteric coated 81 milliGRAM(s) Oral daily  atorvastatin 80 milliGRAM(s) Oral at bedtime  benzonatate 100 milliGRAM(s) Oral three times a day  dextrose 5%. 1000 milliLiter(s) (50 mL/Hr) IV Continuous <Continuous>  dextrose 50% Injectable 12.5 Gram(s) IV Push once  dextrose 50% Injectable 25 Gram(s) IV Push once  dextrose 50% Injectable 25 Gram(s) IV Push once  diltiazem    milliGRAM(s) Oral daily  docusate sodium 100 milliGRAM(s) Oral two times a day  furosemide   Injectable 40 milliGRAM(s) IV Push two times a day  insulin glargine Injectable (LANTUS) 40 Unit(s) SubCutaneous at bedtime  insulin lispro (HumaLOG) corrective regimen sliding scale   SubCutaneous three times a day before meals  insulin lispro (HumaLOG) corrective regimen sliding scale   SubCutaneous at bedtime  insulin lispro Injectable (HumaLOG) 10 Unit(s) SubCutaneous three times a day before meals  metoprolol succinate ER 75 milliGRAM(s) Oral two times a day  rivaroxaban 15 milliGRAM(s) Oral with dinner  senna 2 Tablet(s) Oral at bedtime  tamsulosin 0.4 milliGRAM(s) Oral at bedtime    MEDICATIONS  (PRN):  acetaminophen   Tablet .. 650 milliGRAM(s) Oral every 6 hours PRN Temp greater or equal to 38C (100.4F)  acetaminophen   Tablet .. 650 milliGRAM(s) Oral every 6 hours PRN Mild Pain (1 - 3)  benzocaine 15 mG/menthol 3.6 mG (Sugar-Free) Lozenge 1 Lozenge Oral four times a day PRN Sore Throat  dextrose 40% Gel 15 Gram(s) Oral once PRN Blood Glucose LESS THAN 70 milliGRAM(s)/deciliter  glucagon  Injectable 1 milliGRAM(s) IntraMuscular once PRN Glucose LESS THAN 70 milligrams/deciliter  guaiFENesin   Syrup  (Sugar-Free) 100 milliGRAM(s) Oral every 6 hours PRN Cough  oxyCODONE    5 mG/acetaminophen 325 mG 2 Tablet(s) Oral every 6 hours PRN Severe Pain (7 - 10)       Allergies    No Known Allergies    Intolerances      Vital Signs Last 24 Hrs  T(C): 37.3 (10 Sep 2019 17:09), Max: 37.3 (10 Sep 2019 17:09)  T(F): 99.2 (10 Sep 2019 17:09), Max: 99.2 (10 Sep 2019 17:09)  HR: 96 (10 Sep 2019 17:09) (93 - 100)  BP: 130/62 (10 Sep 2019 17:09) (119/58 - 135/75)  BP(mean): --  RR: 18 (10 Sep 2019 17:09) (18 - 18)  SpO2: 98% (10 Sep 2019 17:09) (98% - 100%)  CAPILLARY BLOOD GLUCOSE      POCT Blood Glucose.: 292 mg/dL (10 Sep 2019 16:42)  POCT Blood Glucose.: 292 mg/dL (10 Sep 2019 11:35)  POCT Blood Glucose.: 209 mg/dL (10 Sep 2019 07:31)  POCT Blood Glucose.: 265 mg/dL (09 Sep 2019 21:42)      09-09 @ 07:01  -  09-10 @ 07:00  --------------------------------------------------------  IN: 780 mL / OUT: 1860 mL / NET: -1080 mL    09-10 @ 07:01  -  09-10 @ 19:07  --------------------------------------------------------  IN: 0 mL / OUT: 700 mL / NET: -700 mL      Physical Exam:    General: NAD   HEENT:  Nonicteric, PERRLA  CV:  RRR, S1S2   Lungs:  CTA  Abdomen:  Soft, non-tender, no distended, positive BS  Extremities: edema  LLE > RLE     Neuro:  AAOx3, non-focal, grossly intact                                                                                                                                                                                                                                                                                                LABS:                               8.3    11.9  )-----------( 392      ( 10 Sep 2019 12:28 )             24.1                      09-10    124<L>  |  88<L>  |  36<H>  ----------------------------<  258<H>  4.4   |  24  |  1.69<H>    Ca    9.3      10 Sep 2019 12:28
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
## 2019-09-10 NOTE — CHART NOTE - NSCHARTNOTEFT_GEN_A_CORE
Medicine NP Episodic Note    Notified by RN, pt's  at 21:11.  Pt. is due for Lantus 40U at HS.  Pt. refused Lantus dose because of concern for hypoglycemia in the morning.  Pt. was encouraged to have a bedtime snack to offset the risk of hypoglycemic episode prior to Lantus dosing.  Pt. however refused to eat.  Repeat FS 99 at 22:10.  Pt. seen and examined at bedside, A+Ox3, in NAD.  No s/s of hypoglycemia noted.  Endorses aforementioned concern about hypoglycemia and not agreeable to Lantus.  Discussed with Dr. Rossi (Endo) w/ rec to decrease Lantus to 20U x 1 tonight once FS > 100.  Information communicated to pt. and is agreeable with plan.  Also agreeable to having a bedtime snack.  Will continue FS monitoring AC/HS and monitor for s/s hyper/hypoglycemia.  Will endorse to primary team,.  Attending and Endo to follow.    Shelbie Lee, [facility]  [phone] Medicine NP Episodic Note    Notified by RN, pt's  at 21:11.  Pt. is due for Lantus 40U at HS.  Pt. refused Lantus dose because of concern for hypoglycemia in the morning.  Pt. was encouraged to have a bedtime snack to offset the risk of hypoglycemic episode prior to Lantus dosing.  Pt. however refused to eat.  Repeat FS 99 at 22:10.  Pt. seen and examined at bedside, A+Ox3, in NAD.  No s/s of hypoglycemia noted.  Endorses aforementioned concern about hypoglycemia and not agreeable to Lantus.  Discussed with Dr. Rossi (Endo) w/ rec to decrease Lantus to 20U x 1 tonight once FS > 100.  Information communicated to pt. and pt. is agreeable with plan.  Also agreeable to having a bedtime snack.  Will continue FS monitoring AC/HS and monitor for s/s hyper/hypoglycemia.  Will endorse to primary team,.  Attending and Endo to follow.    Shelbie Lee, P-BC  [phone]

## 2019-09-10 NOTE — PROGRESS NOTE ADULT - SUBJECTIVE AND OBJECTIVE BOX
Chief complaint  Patient is a 64y old  Male who presents with a chief complaint of Cellulitis (10 Sep 2019 08:49)   Review of systems  Patient in bed, looks comfortable, no fever, no hypoglycemia.    Labs and Fingersticks  CAPILLARY BLOOD GLUCOSE      POCT Blood Glucose.: 292 mg/dL (10 Sep 2019 11:35)  POCT Blood Glucose.: 209 mg/dL (10 Sep 2019 07:31)  POCT Blood Glucose.: 265 mg/dL (09 Sep 2019 21:42)  POCT Blood Glucose.: 176 mg/dL (09 Sep 2019 16:38)      Anion Gap, Serum: 12 (09-10 @ 06:51)  Anion Gap, Serum: 16 (09-09 @ 07:15)      Calcium, Total Serum: 9.3 (09-10 @ 12:28)  Calcium, Total Serum: 9.0 (09-10 @ 06:51)  Calcium, Total Serum: 9.8 (09-09 @ 07:15)          09-10    124<L>  |  88<L>  |  36<H>  ----------------------------<  258<H>  4.4   |  24  |  1.69<H>    Ca    9.3      10 Sep 2019 12:28                          8.3    11.9  )-----------( 392      ( 10 Sep 2019 12:28 )             24.1     Medications  MEDICATIONS  (STANDING):  aspirin enteric coated 81 milliGRAM(s) Oral daily  atorvastatin 80 milliGRAM(s) Oral at bedtime  benzonatate 100 milliGRAM(s) Oral three times a day  dextrose 5%. 1000 milliLiter(s) (50 mL/Hr) IV Continuous <Continuous>  dextrose 50% Injectable 12.5 Gram(s) IV Push once  dextrose 50% Injectable 25 Gram(s) IV Push once  dextrose 50% Injectable 25 Gram(s) IV Push once  diltiazem    milliGRAM(s) Oral daily  docusate sodium 100 milliGRAM(s) Oral two times a day  insulin glargine Injectable (LANTUS) 40 Unit(s) SubCutaneous at bedtime  insulin lispro (HumaLOG) corrective regimen sliding scale   SubCutaneous three times a day before meals  insulin lispro (HumaLOG) corrective regimen sliding scale   SubCutaneous at bedtime  insulin lispro Injectable (HumaLOG) 10 Unit(s) SubCutaneous three times a day before meals  metoprolol succinate ER 75 milliGRAM(s) Oral two times a day  rivaroxaban 15 milliGRAM(s) Oral with dinner  senna 2 Tablet(s) Oral at bedtime  sodium chloride 0.9%. 1000 milliLiter(s) (75 mL/Hr) IV Continuous <Continuous>  tamsulosin 0.4 milliGRAM(s) Oral at bedtime  tolvaptan 15 milliGRAM(s) Oral once      Physical Exam  General: Patient comfortable in bed  Vital Signs Last 12 Hrs  T(F): 98.2 (09-10-19 @ 12:30), Max: 98.2 (09-10-19 @ 12:30)  HR: 93 (09-10-19 @ 11:17) (93 - 96)  BP: 119/58 (09-10-19 @ 11:17) (119/58 - 126/65)  BP(mean): --  RR: 18 (09-10-19 @ 11:17) (18 - 18)  SpO2: 100% (09-10-19 @ 11:17) (98% - 100%)  Neck: No palpable thyroid nodules.  CVS: S1S2, No murmurs  Respiratory: No wheezing, no crepitations  GI: Abdomen soft, bowel sounds positive  Musculoskeletal:  edema lower extremities.   Skin: No skin rashes, no ecchymosis    Diagnostics

## 2019-09-10 NOTE — PROGRESS NOTE ADULT - SUBJECTIVE AND OBJECTIVE BOX
SUBJECTIVE: The patient denies chest pain, shortness of breath, arm pain or jaw pain, dizziness or palpitations.    MEDICATIONS  (STANDING):  aspirin enteric coated 81 milliGRAM(s) Oral daily  atorvastatin 80 milliGRAM(s) Oral at bedtime  benzonatate 100 milliGRAM(s) Oral three times a day  dextrose 5%. 1000 milliLiter(s) (50 mL/Hr) IV Continuous <Continuous>  dextrose 50% Injectable 12.5 Gram(s) IV Push once  dextrose 50% Injectable 25 Gram(s) IV Push once  dextrose 50% Injectable 25 Gram(s) IV Push once  diltiazem    milliGRAM(s) Oral daily  docusate sodium 100 milliGRAM(s) Oral two times a day  insulin glargine Injectable (LANTUS) 40 Unit(s) SubCutaneous at bedtime  insulin lispro (HumaLOG) corrective regimen sliding scale   SubCutaneous three times a day before meals  insulin lispro (HumaLOG) corrective regimen sliding scale   SubCutaneous at bedtime  insulin lispro Injectable (HumaLOG) 10 Unit(s) SubCutaneous three times a day before meals  metoprolol succinate ER 75 milliGRAM(s) Oral two times a day  rivaroxaban 15 milliGRAM(s) Oral with dinner  senna 2 Tablet(s) Oral at bedtime  tamsulosin 0.4 milliGRAM(s) Oral at bedtime    MEDICATIONS  (PRN):  acetaminophen   Tablet .. 650 milliGRAM(s) Oral every 6 hours PRN Temp greater or equal to 38C (100.4F)  acetaminophen   Tablet .. 650 milliGRAM(s) Oral every 6 hours PRN Mild Pain (1 - 3)  benzocaine 15 mG/menthol 3.6 mG (Sugar-Free) Lozenge 1 Lozenge Oral four times a day PRN Sore Throat  dextrose 40% Gel 15 Gram(s) Oral once PRN Blood Glucose LESS THAN 70 milliGRAM(s)/deciliter  glucagon  Injectable 1 milliGRAM(s) IntraMuscular once PRN Glucose LESS THAN 70 milligrams/deciliter  guaiFENesin   Syrup  (Sugar-Free) 100 milliGRAM(s) Oral every 6 hours PRN Cough  oxyCODONE    5 mG/acetaminophen 325 mG 2 Tablet(s) Oral every 6 hours PRN Severe Pain (7 - 10)              REVIEW OF SYSTEMS:    CONSTITUTIONAL: No fever, weight loss, or fatigue  EYES: No eye pain, visual disturbances, or discharge  NECK: No pain or stiffness  RESPIRATORY: No cough, wheezing, chills or hemoptysis; No Shortness of Breath  CARDIOVASCULAR: No chest pain, palpitations, dizziness, or leg swelling  GASTROINTESTINAL: No abdominal or epigastric pain. No nausea, vomiting, or hematemesis; No diarrhea or constipation. No melena or hematochezia.  GENITOURINARY: No dysuria, frequency, hematuria, or incontinence  NEUROLOGICAL: No headaches, memory loss, loss of strength, numbness, or tremors  SKIN: No itching, burning, rashes, or lesions   LYMPH Nodes: No enlarged glands  MUSCULOSKELETAL: No joint pain or swelling; No muscle, back, or extremity pain  All other review of systems are negative.  	  [ ] Unable to obtain    PHYSICAL EXAM:  T(F): 98.1 (09-10-19 @ 04:29), Max: 99 (09-09-19 @ 22:27)  HR: 96 (09-10-19 @ 04:29) (94 - 102)  BP: 126/65 (09-10-19 @ 04:29) (124/69 - 135/75)  RR: 18 (09-10-19 @ 04:29) (18 - 18)  SpO2: 98% (09-10-19 @ 04:29) (98% - 100%)  Wt(kg): --  ,   I&O's Summary    09 Sep 2019 07:01  -  10 Sep 2019 07:00  --------------------------------------------------------  IN: 780 mL / OUT: 1860 mL / NET: -1080 mL          PHYSICAL EXAM    Appearance: Normal	  HEENT:   Normal oral mucosa, PERRL, EOMI	  NECK: Soft and supple, No LAD, No JVD  Cardiovascular: Regular Rate and Rhythm, Normal S1 S2, No murmurs, No clicks, gallops or rubs  Respiratory: Lungs clear to auscultation	  Gastrointestinal:  Soft, Non-tender, + BS	  Skin: No rashes, No ecchymoses, No cyanosis  Neurologic: Non-focal  Extremities: No clubbing, cyanosis or edema  Vascular: Peripheral pulses palpable 2+ bilaterally    TELEMETRY: Atrial fibrillation with moderate ventricular response up to 110    LABS:	 	                          8.2    10.4  )-----------( 393      ( 10 Sep 2019 06:51 )             24.5               09-10    129<L>  |  93<L>  |  37<H>  ----------------------------<  200<H>  4.4   |  24  |  1.72<H>    Ca    9.0      10 Sep 2019 06:51 SUBJECTIVE: The patient denies chest pain, shortness of breath, arm pain or jaw pain, dizziness or palpitations.    MEDICATIONS  (STANDING):  aspirin enteric coated 81 milliGRAM(s) Oral daily  atorvastatin 80 milliGRAM(s) Oral at bedtime  benzonatate 100 milliGRAM(s) Oral three times a day  dextrose 5%. 1000 milliLiter(s) (50 mL/Hr) IV Continuous <Continuous>  dextrose 50% Injectable 12.5 Gram(s) IV Push once  dextrose 50% Injectable 25 Gram(s) IV Push once  dextrose 50% Injectable 25 Gram(s) IV Push once  diltiazem    milliGRAM(s) Oral daily  docusate sodium 100 milliGRAM(s) Oral two times a day  insulin glargine Injectable (LANTUS) 40 Unit(s) SubCutaneous at bedtime  insulin lispro (HumaLOG) corrective regimen sliding scale   SubCutaneous three times a day before meals  insulin lispro (HumaLOG) corrective regimen sliding scale   SubCutaneous at bedtime  insulin lispro Injectable (HumaLOG) 10 Unit(s) SubCutaneous three times a day before meals  metoprolol succinate ER 75 milliGRAM(s) Oral two times a day  rivaroxaban 15 milliGRAM(s) Oral with dinner  senna 2 Tablet(s) Oral at bedtime  tamsulosin 0.4 milliGRAM(s) Oral at bedtime    MEDICATIONS  (PRN):  acetaminophen   Tablet .. 650 milliGRAM(s) Oral every 6 hours PRN Temp greater or equal to 38C (100.4F)  acetaminophen   Tablet .. 650 milliGRAM(s) Oral every 6 hours PRN Mild Pain (1 - 3)  benzocaine 15 mG/menthol 3.6 mG (Sugar-Free) Lozenge 1 Lozenge Oral four times a day PRN Sore Throat  dextrose 40% Gel 15 Gram(s) Oral once PRN Blood Glucose LESS THAN 70 milliGRAM(s)/deciliter  glucagon  Injectable 1 milliGRAM(s) IntraMuscular once PRN Glucose LESS THAN 70 milligrams/deciliter  guaiFENesin   Syrup  (Sugar-Free) 100 milliGRAM(s) Oral every 6 hours PRN Cough  oxyCODONE    5 mG/acetaminophen 325 mG 2 Tablet(s) Oral every 6 hours PRN Severe Pain (7 - 10)              REVIEW OF SYSTEMS:    CONSTITUTIONAL: No fever, weight loss, or fatigue  EYES: No eye pain, visual disturbances, or discharge  NECK: No pain or stiffness  RESPIRATORY: No cough, wheezing, chills or hemoptysis; No Shortness of Breath  CARDIOVASCULAR: No chest pain, palpitations, dizziness, or leg swelling  GASTROINTESTINAL: No abdominal or epigastric pain. No nausea, vomiting, or hematemesis; No diarrhea or constipation. No melena or hematochezia.  GENITOURINARY: No dysuria, frequency, hematuria, or incontinence  NEUROLOGICAL: No headaches, memory loss, loss of strength, numbness, or tremors  SKIN: No itching, burning, rashes, or lesions   LYMPH Nodes: No enlarged glands  MUSCULOSKELETAL: No joint pain or swelling; No muscle, back, or extremity pain  All other review of systems are negative.  	  [ ] Unable to obtain    PHYSICAL EXAM:  T(F): 98.1 (09-10-19 @ 04:29), Max: 99 (09-09-19 @ 22:27)  HR: 96 (09-10-19 @ 04:29) (94 - 102)  BP: 126/65 (09-10-19 @ 04:29) (124/69 - 135/75)  RR: 18 (09-10-19 @ 04:29) (18 - 18)  SpO2: 98% (09-10-19 @ 04:29) (98% - 100%)  Wt(kg): --  ,   I&O's Summary    09 Sep 2019 07:01  -  10 Sep 2019 07:00  --------------------------------------------------------  IN: 780 mL / OUT: 1860 mL / NET: -1080 mL                PHYSICAL EXAM    Appearance: Normal	  HEENT:   Normal oral mucosa, PERRL, EOMI	  NECK: Soft and supple, No LAD, No JVD  Cardiovascular: Regular Rate and Rhythm, Normal S1 S2, No murmurs, No clicks, gallops or rubs  Respiratory: Lungs clear to auscultation	  Gastrointestinal:  Soft, Non-tender, + BS	  Skin: No rashes, No ecchymoses, No cyanosis  Neurologic: Non-focal  Extremities: No clubbing, cyanosis or edema  Vascular: Peripheral pulses palpable 2+ bilaterally    TELEMETRY: Atrial fibrillation with moderate ventricular response up to 110    LABS:	 	                          8.2    10.4  )-----------( 393      ( 10 Sep 2019 06:51 )             24.5               09-10    129<L>  |  93<L>  |  37<H>  ----------------------------<  200<H>  4.4   |  24  |  1.72<H>    Ca    9.0      10 Sep 2019 06:51

## 2019-09-10 NOTE — PROGRESS NOTE ADULT - SUBJECTIVE AND OBJECTIVE BOX
No pain, no shortness of breath      VITAL:  T(C): , Max: 37.2 (09-09-19 @ 22:27)  T(F): , Max: 99 (09-09-19 @ 22:27)  HR: 93 (09-10-19 @ 11:17)  BP: 119/58 (09-10-19 @ 11:17)  BP(mean): --  RR: 18 (09-10-19 @ 11:17)  SpO2: 100% (09-10-19 @ 11:17)      PHYSICAL EXAM:  Constitutional: NAD; Alert  HEENT:  NCAT; DMM  Neck: No JVD; supple  Respiratory: CTA-b/l  Cardiac: RRR s1s2  Gastrointestinal: BS+, soft, NT/ND  Urologic: No moreno; (+)scrotal swelling  Extremities: 1+ b/l LE edema  Back: No CVAT b/l    LABS:                        8.3    11.9  )-----------( 392      ( 10 Sep 2019 12:28 )             24.1     Na(124)/K(4.4)/Cl(88)/HCO3(24)/BUN(36)/Cr(1.69)Glu(258)/Ca(9.3)/Mg(--)/PO4(--)    09-10 @ 12:28  Na(129)/K(4.4)/Cl(93)/HCO3(24)/BUN(37)/Cr(1.72)Glu(200)/Ca(9.0)/Mg(--)/PO4(--)    09-10 @ 06:51  Na(126)/K(4.6)/Cl(88)/HCO3(22)/BUN(34)/Cr(1.69)Glu(219)/Ca(9.8)/Mg(--)/PO4(--)    09-09 @ 07:15  Na(128)/K(4.2)/Cl(94)/HCO3(22)/BUN(28)/Cr(1.50)Glu(221)/Ca(9.0)/Mg(--)/PO4(--)    09-08 @ 06:31      ASSESSMENT: 64M with PMHx of HTN, HLD, T2DM, DM neuropathy, afib, and CAD, 8/29/19 a/w RLE cellulitis    (1)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 Initially got IVF yesterday; then IV lasix. Serum sodium up this a.m., then down early this afternoon. Now on NS.      RECOMMENDATIONS:  (1)NS 1L as ordered  (2)Repeat BMP after NS to see if improved; if serum sodium is 125 or lower, would give Tolvaptan 15mg po x 1 and place on Lasix 60-80mg po qd starting tomorrow          Markell Walton MD  [facility]  [phone] No pain, no shortness of breath      VITAL:  T(C): , Max: 37.2 (09-09-19 @ 22:27)  T(F): , Max: 99 (09-09-19 @ 22:27)  HR: 93 (09-10-19 @ 11:17)  BP: 119/58 (09-10-19 @ 11:17)  RR: 18 (09-10-19 @ 11:17)  SpO2: 100% (09-10-19 @ 11:17)      PHYSICAL EXAM:  Constitutional: NAD; Alert  HEENT:  NCAT; DMM  Neck: No JVD; supple  Respiratory: CTA-b/l  Cardiac: RRR s1s2  Gastrointestinal: BS+, soft, NT/ND  Urologic: No moreno; (+)scrotal swelling  Extremities: 1+ b/l LE edema  Back: No CVAT b/l    LABS:                        8.3    11.9  )-----------( 392      ( 10 Sep 2019 12:28 )             24.1     Na(124)/K(4.4)/Cl(88)/HCO3(24)/BUN(36)/Cr(1.69)Glu(258)/Ca(9.3)/Mg(--)/PO4(--)    09-10 @ 12:28  Na(129)/K(4.4)/Cl(93)/HCO3(24)/BUN(37)/Cr(1.72)Glu(200)/Ca(9.0)/Mg(--)/PO4(--)    09-10 @ 06:51  Na(126)/K(4.6)/Cl(88)/HCO3(22)/BUN(34)/Cr(1.69)Glu(219)/Ca(9.8)/Mg(--)/PO4(--)    09-09 @ 07:15  Na(128)/K(4.2)/Cl(94)/HCO3(22)/BUN(28)/Cr(1.50)Glu(221)/Ca(9.0)/Mg(--)/PO4(--)    09-08 @ 06:31      ASSESSMENT: 64M with PMHx of HTN, HLD, T2DM, DM neuropathy, afib, and CAD, 8/29/19 a/w RLE cellulitis    (1)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 Initially got IVF yesterday; then IV lasix. Serum sodium up this a.m., then down early this afternoon. Now on NS.      RECOMMENDATIONS:  (1)NS 1L as ordered  (2)Repeat BMP early tomorrow to see if Na+ has improved; if serum sodium tomorrow early a.m. is 125 or lower, would give Tolvaptan 15mg po x 1 and place on Lasix 60-80mg po qd starting tomorrow          Markell Walton MD  [facility]  [phone] No pain, no shortness of breath      VITAL:  T(C): , Max: 37.2 (09-09-19 @ 22:27)  T(F): , Max: 99 (09-09-19 @ 22:27)  HR: 93 (09-10-19 @ 11:17)  BP: 119/58 (09-10-19 @ 11:17)  RR: 18 (09-10-19 @ 11:17)  SpO2: 100% (09-10-19 @ 11:17)      PHYSICAL EXAM:  Constitutional: NAD; Alert  HEENT:  NCAT; DMM  Neck: No JVD; supple  Respiratory: CTA-b/l  Cardiac: RRR s1s2  Gastrointestinal: BS+, soft, NT/ND  Urologic: No moreno; (+)scrotal swelling  Extremities: 1+ b/l LE edema  Back: No CVAT b/l    LABS:                        8.3    11.9  )-----------( 392      ( 10 Sep 2019 12:28 )             24.1     Na(124)/K(4.4)/Cl(88)/HCO3(24)/BUN(36)/Cr(1.69)Glu(258)/Ca(9.3)/Mg(--)/PO4(--)    09-10 @ 12:28  Na(129)/K(4.4)/Cl(93)/HCO3(24)/BUN(37)/Cr(1.72)Glu(200)/Ca(9.0)/Mg(--)/PO4(--)    09-10 @ 06:51  Na(126)/K(4.6)/Cl(88)/HCO3(22)/BUN(34)/Cr(1.69)Glu(219)/Ca(9.8)/Mg(--)/PO4(--)    09-09 @ 07:15  Na(128)/K(4.2)/Cl(94)/HCO3(22)/BUN(28)/Cr(1.50)Glu(221)/Ca(9.0)/Mg(--)/PO4(--)    09-08 @ 06:31      ASSESSMENT: 64M with PMHx of HTN, HLD, T2DM, DM neuropathy, afib, and CAD, 8/29/19 a/w RLE cellulitis    (1)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 Initially got IVF yesterday; then IV lasix. Serum sodium up this a.m., then down early this afternoon. Ordered for NS today. Regardless of what we do for the next 24 hours, it is imperative that we continue in that direction; we must give our plan a chance to work. For now, I would presume that he is hypervolemic, and attempt diuresis.      RECOMMENDATIONS:  (1)No IVF   (2)1.2L free water restrict - pt counseled - encouraged sucking candies/ice chips for thirst  (3)Lasix 40mg iv bid for now  (4)BMP q12h for now          Markell Walton MD  [facility]  [phone] No pain, no shortness of breath      VITAL:  T(C): , Max: 37.2 (09-09-19 @ 22:27)  T(F): , Max: 99 (09-09-19 @ 22:27)  HR: 93 (09-10-19 @ 11:17)  BP: 119/58 (09-10-19 @ 11:17)  RR: 18 (09-10-19 @ 11:17)  SpO2: 100% (09-10-19 @ 11:17)      PHYSICAL EXAM:  Constitutional: NAD; Alert  HEENT:  NCAT; DMM  Neck: No JVD; supple  Respiratory: CTA-b/l  Cardiac: RRR s1s2  Gastrointestinal: BS+, soft, NT/ND  Urologic: No moreno; (+)scrotal swelling  Extremities: L>RLE edema  Back: No CVAT b/l    LABS:                        8.3    11.9  )-----------( 392      ( 10 Sep 2019 12:28 )             24.1     Na(124)/K(4.4)/Cl(88)/HCO3(24)/BUN(36)/Cr(1.69)Glu(258)/Ca(9.3)/Mg(--)/PO4(--)    09-10 @ 12:28  Na(129)/K(4.4)/Cl(93)/HCO3(24)/BUN(37)/Cr(1.72)Glu(200)/Ca(9.0)/Mg(--)/PO4(--)    09-10 @ 06:51  Na(126)/K(4.6)/Cl(88)/HCO3(22)/BUN(34)/Cr(1.69)Glu(219)/Ca(9.8)/Mg(--)/PO4(--)    09-09 @ 07:15  Na(128)/K(4.2)/Cl(94)/HCO3(22)/BUN(28)/Cr(1.50)Glu(221)/Ca(9.0)/Mg(--)/PO4(--)    09-08 @ 06:31      ASSESSMENT: 64M with PMHx of HTN, HLD, T2DM, DM neuropathy, afib, and CAD, 8/29/19 a/w RLE cellulitis    (1)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 Initially got IVF yesterday; then IV lasix. Serum sodium up this a.m., then down early this afternoon. Ordered for NS today. Regardless of what we do for the next 24 hours, it is imperative that we continue in that direction; we must give our plan a chance to work. For now, I would presume that he is hypervolemic, and attempt diuresis.      RECOMMENDATIONS:  (1)No IVF   (2)1.2L free water restrict - pt counseled - encouraged sucking candies/ice chips for thirst  (3)Lasix 40mg iv bid for now  (4)BMP q12h for now          Markell Walton MD  [facility]  [phone]

## 2019-09-11 ENCOUNTER — TRANSCRIPTION ENCOUNTER (OUTPATIENT)
Age: 64
End: 2019-09-11

## 2019-09-11 VITALS
OXYGEN SATURATION: 98 % | SYSTOLIC BLOOD PRESSURE: 131 MMHG | HEART RATE: 95 BPM | DIASTOLIC BLOOD PRESSURE: 69 MMHG | RESPIRATION RATE: 18 BRPM

## 2019-09-11 LAB
ANION GAP SERPL CALC-SCNC: 15 MMOL/L — SIGNIFICANT CHANGE UP (ref 5–17)
ANION GAP SERPL CALC-SCNC: 17 MMOL/L — SIGNIFICANT CHANGE UP (ref 5–17)
BLD GP AB SCN SERPL QL: NEGATIVE — SIGNIFICANT CHANGE UP
BUN SERPL-MCNC: 36 MG/DL — HIGH (ref 7–23)
BUN SERPL-MCNC: 38 MG/DL — HIGH (ref 7–23)
CALCIUM SERPL-MCNC: 8.6 MG/DL — SIGNIFICANT CHANGE UP (ref 8.4–10.5)
CALCIUM SERPL-MCNC: 9 MG/DL — SIGNIFICANT CHANGE UP (ref 8.4–10.5)
CHLORIDE SERPL-SCNC: 92 MMOL/L — LOW (ref 96–108)
CHLORIDE SERPL-SCNC: 95 MMOL/L — LOW (ref 96–108)
CO2 SERPL-SCNC: 23 MMOL/L — SIGNIFICANT CHANGE UP (ref 22–31)
CO2 SERPL-SCNC: 24 MMOL/L — SIGNIFICANT CHANGE UP (ref 22–31)
CREAT SERPL-MCNC: 1.67 MG/DL — HIGH (ref 0.5–1.3)
CREAT SERPL-MCNC: 1.75 MG/DL — HIGH (ref 0.5–1.3)
GLUCOSE BLDC GLUCOMTR-MCNC: 133 MG/DL — HIGH (ref 70–99)
GLUCOSE BLDC GLUCOMTR-MCNC: 137 MG/DL — HIGH (ref 70–99)
GLUCOSE BLDC GLUCOMTR-MCNC: 240 MG/DL — HIGH (ref 70–99)
GLUCOSE SERPL-MCNC: 143 MG/DL — HIGH (ref 70–99)
GLUCOSE SERPL-MCNC: 234 MG/DL — HIGH (ref 70–99)
HCT VFR BLD CALC: 27.1 % — LOW (ref 39–50)
HCT VFR BLD CALC: 29.7 % — LOW (ref 39–50)
HGB BLD-MCNC: 8.9 G/DL — LOW (ref 13–17)
HGB BLD-MCNC: 9.8 G/DL — LOW (ref 13–17)
MCHC RBC-ENTMCNC: 29 PG — SIGNIFICANT CHANGE UP (ref 27–34)
MCHC RBC-ENTMCNC: 29.3 PG — SIGNIFICANT CHANGE UP (ref 27–34)
MCHC RBC-ENTMCNC: 33 GM/DL — SIGNIFICANT CHANGE UP (ref 32–36)
MCHC RBC-ENTMCNC: 33.2 GM/DL — SIGNIFICANT CHANGE UP (ref 32–36)
MCV RBC AUTO: 87.9 FL — SIGNIFICANT CHANGE UP (ref 80–100)
MCV RBC AUTO: 88.2 FL — SIGNIFICANT CHANGE UP (ref 80–100)
PLATELET # BLD AUTO: 440 K/UL — HIGH (ref 150–400)
PLATELET # BLD AUTO: 457 K/UL — HIGH (ref 150–400)
POTASSIUM SERPL-MCNC: 3.7 MMOL/L — SIGNIFICANT CHANGE UP (ref 3.5–5.3)
POTASSIUM SERPL-MCNC: 4.2 MMOL/L — SIGNIFICANT CHANGE UP (ref 3.5–5.3)
POTASSIUM SERPL-SCNC: 3.7 MMOL/L — SIGNIFICANT CHANGE UP (ref 3.5–5.3)
POTASSIUM SERPL-SCNC: 4.2 MMOL/L — SIGNIFICANT CHANGE UP (ref 3.5–5.3)
RBC # BLD: 3.08 M/UL — LOW (ref 4.2–5.8)
RBC # BLD: 3.36 M/UL — LOW (ref 4.2–5.8)
RBC # FLD: 14.2 % — SIGNIFICANT CHANGE UP (ref 10.3–14.5)
RBC # FLD: 14.2 % — SIGNIFICANT CHANGE UP (ref 10.3–14.5)
RH IG SCN BLD-IMP: POSITIVE — SIGNIFICANT CHANGE UP
SODIUM SERPL-SCNC: 132 MMOL/L — LOW (ref 135–145)
SODIUM SERPL-SCNC: 134 MMOL/L — LOW (ref 135–145)
WBC # BLD: 10.3 K/UL — SIGNIFICANT CHANGE UP (ref 3.8–10.5)
WBC # BLD: 12.6 K/UL — HIGH (ref 3.8–10.5)
WBC # FLD AUTO: 10.3 K/UL — SIGNIFICANT CHANGE UP (ref 3.8–10.5)
WBC # FLD AUTO: 12.6 K/UL — HIGH (ref 3.8–10.5)

## 2019-09-11 PROCEDURE — 82010 KETONE BODYS QUAN: CPT

## 2019-09-11 PROCEDURE — 84100 ASSAY OF PHOSPHORUS: CPT

## 2019-09-11 PROCEDURE — 93306 TTE W/DOPPLER COMPLETE: CPT

## 2019-09-11 PROCEDURE — 82947 ASSAY GLUCOSE BLOOD QUANT: CPT

## 2019-09-11 PROCEDURE — 83036 HEMOGLOBIN GLYCOSYLATED A1C: CPT

## 2019-09-11 PROCEDURE — 80048 BASIC METABOLIC PNL TOTAL CA: CPT

## 2019-09-11 PROCEDURE — 92960 CARDIOVERSION ELECTRIC EXT: CPT

## 2019-09-11 PROCEDURE — 87581 M.PNEUMON DNA AMP PROBE: CPT

## 2019-09-11 PROCEDURE — 83540 ASSAY OF IRON: CPT

## 2019-09-11 PROCEDURE — 83615 LACTATE (LD) (LDH) ENZYME: CPT

## 2019-09-11 PROCEDURE — 82435 ASSAY OF BLOOD CHLORIDE: CPT

## 2019-09-11 PROCEDURE — 72192 CT PELVIS W/O DYE: CPT

## 2019-09-11 PROCEDURE — 85027 COMPLETE CBC AUTOMATED: CPT

## 2019-09-11 PROCEDURE — 83010 ASSAY OF HAPTOGLOBIN QUANT: CPT

## 2019-09-11 PROCEDURE — 36430 TRANSFUSION BLD/BLD COMPNT: CPT

## 2019-09-11 PROCEDURE — 82550 ASSAY OF CK (CPK): CPT

## 2019-09-11 PROCEDURE — 86334 IMMUNOFIX E-PHORESIS SERUM: CPT

## 2019-09-11 PROCEDURE — 86923 COMPATIBILITY TEST ELECTRIC: CPT

## 2019-09-11 PROCEDURE — 85652 RBC SED RATE AUTOMATED: CPT

## 2019-09-11 PROCEDURE — 82962 GLUCOSE BLOOD TEST: CPT

## 2019-09-11 PROCEDURE — 76770 US EXAM ABDO BACK WALL COMP: CPT

## 2019-09-11 PROCEDURE — 86850 RBC ANTIBODY SCREEN: CPT

## 2019-09-11 PROCEDURE — 83605 ASSAY OF LACTIC ACID: CPT

## 2019-09-11 PROCEDURE — 71046 X-RAY EXAM CHEST 2 VIEWS: CPT

## 2019-09-11 PROCEDURE — 81001 URINALYSIS AUTO W/SCOPE: CPT

## 2019-09-11 PROCEDURE — 76882 US LMTD JT/FCL EVL NVASC XTR: CPT

## 2019-09-11 PROCEDURE — 87040 BLOOD CULTURE FOR BACTERIA: CPT

## 2019-09-11 PROCEDURE — 85014 HEMATOCRIT: CPT

## 2019-09-11 PROCEDURE — 84300 ASSAY OF URINE SODIUM: CPT

## 2019-09-11 PROCEDURE — 76870 US EXAM SCROTUM: CPT

## 2019-09-11 PROCEDURE — 82803 BLOOD GASES ANY COMBINATION: CPT

## 2019-09-11 PROCEDURE — 93975 VASCULAR STUDY: CPT

## 2019-09-11 PROCEDURE — 82330 ASSAY OF CALCIUM: CPT

## 2019-09-11 PROCEDURE — 83550 IRON BINDING TEST: CPT

## 2019-09-11 PROCEDURE — 93005 ELECTROCARDIOGRAM TRACING: CPT

## 2019-09-11 PROCEDURE — P9016: CPT

## 2019-09-11 PROCEDURE — 84145 PROCALCITONIN (PCT): CPT

## 2019-09-11 PROCEDURE — 72192 CT PELVIS W/O DYE: CPT | Mod: 26

## 2019-09-11 PROCEDURE — 82746 ASSAY OF FOLIC ACID SERUM: CPT

## 2019-09-11 PROCEDURE — 84156 ASSAY OF PROTEIN URINE: CPT

## 2019-09-11 PROCEDURE — 87205 SMEAR GRAM STAIN: CPT

## 2019-09-11 PROCEDURE — 82728 ASSAY OF FERRITIN: CPT

## 2019-09-11 PROCEDURE — 73700 CT LOWER EXTREMITY W/O DYE: CPT

## 2019-09-11 PROCEDURE — 87633 RESP VIRUS 12-25 TARGETS: CPT

## 2019-09-11 PROCEDURE — 87486 CHLMYD PNEUM DNA AMP PROBE: CPT

## 2019-09-11 PROCEDURE — 93971 EXTREMITY STUDY: CPT

## 2019-09-11 PROCEDURE — 83735 ASSAY OF MAGNESIUM: CPT

## 2019-09-11 PROCEDURE — 86140 C-REACTIVE PROTEIN: CPT

## 2019-09-11 PROCEDURE — 84295 ASSAY OF SERUM SODIUM: CPT

## 2019-09-11 PROCEDURE — 93312 ECHO TRANSESOPHAGEAL: CPT

## 2019-09-11 PROCEDURE — 82607 VITAMIN B-12: CPT

## 2019-09-11 PROCEDURE — 82570 ASSAY OF URINE CREATININE: CPT

## 2019-09-11 PROCEDURE — 81003 URINALYSIS AUTO W/O SCOPE: CPT

## 2019-09-11 PROCEDURE — 99285 EMERGENCY DEPT VISIT HI MDM: CPT

## 2019-09-11 PROCEDURE — 86900 BLOOD TYPING SEROLOGIC ABO: CPT

## 2019-09-11 PROCEDURE — 83880 ASSAY OF NATRIURETIC PEPTIDE: CPT

## 2019-09-11 PROCEDURE — 86901 BLOOD TYPING SEROLOGIC RH(D): CPT

## 2019-09-11 PROCEDURE — 84132 ASSAY OF SERUM POTASSIUM: CPT

## 2019-09-11 PROCEDURE — 80053 COMPREHEN METABOLIC PANEL: CPT

## 2019-09-11 PROCEDURE — 87798 DETECT AGENT NOS DNA AMP: CPT

## 2019-09-11 RX ORDER — INSULIN LISPRO 100/ML
10 VIAL (ML) SUBCUTANEOUS
Qty: 1 | Refills: 0
Start: 2019-09-11 | End: 2019-10-10

## 2019-09-11 RX ORDER — FUROSEMIDE 40 MG
1 TABLET ORAL
Qty: 60 | Refills: 0
Start: 2019-09-11 | End: 2019-10-10

## 2019-09-11 RX ORDER — METOPROLOL TARTRATE 50 MG
3 TABLET ORAL
Qty: 180 | Refills: 0
Start: 2019-09-11 | End: 2019-10-10

## 2019-09-11 RX ADMIN — Medication 40 MILLIGRAM(S): at 17:00

## 2019-09-11 RX ADMIN — Medication 75 MILLIGRAM(S): at 05:04

## 2019-09-11 RX ADMIN — RIVAROXABAN 15 MILLIGRAM(S): KIT at 17:00

## 2019-09-11 RX ADMIN — Medication 100 MILLIGRAM(S): at 17:00

## 2019-09-11 RX ADMIN — Medication 180 MILLIGRAM(S): at 05:04

## 2019-09-11 RX ADMIN — Medication 81 MILLIGRAM(S): at 12:29

## 2019-09-11 RX ADMIN — Medication 4: at 16:54

## 2019-09-11 RX ADMIN — Medication 10 UNIT(S): at 12:29

## 2019-09-11 RX ADMIN — Medication 40 MILLIGRAM(S): at 05:04

## 2019-09-11 RX ADMIN — Medication 10 UNIT(S): at 16:54

## 2019-09-11 RX ADMIN — Medication 75 MILLIGRAM(S): at 17:01

## 2019-09-11 RX ADMIN — Medication 100 MILLIGRAM(S): at 05:04

## 2019-09-11 RX ADMIN — Medication 10 UNIT(S): at 08:01

## 2019-09-11 NOTE — PROGRESS NOTE ADULT - PROBLEM SELECTOR PROBLEM 3
CAD in native artery

## 2019-09-11 NOTE — PROGRESS NOTE ADULT - REASON FOR ADMISSION
AF
Cellulitis
Cellulitis
Leg pain
RLE cellulitis
cellulitis
Cellulitis of right lower extremity
Cellulitis of right lower extremity
RLE cellulitis
RLE cellulitis
RLE pain
rt ankle cellulitis
rt ankle pain, BARRY, cellulitis
Celulitis

## 2019-09-11 NOTE — PROGRESS NOTE ADULT - ATTENDING COMMENTS
Will continue current insulin regimen. Continue to monitor FS and FU.  Cellulitis Left leg: On antibiotics, stable. ID FU
Will continue current insulin regimen for now. Will continue monitoring FS, log, and follow up.
Will continue current insulin regimen for now. Will continue monitoring FS, log, will Follow up.
Will continue current insulin regimen. Will continue monitoring FS, log, will Follow up.
Will continue current insulin regimen for now. Will continue monitoring FS, log, and follow up.
Will continue current insulin regimen. Will continue monitoring FS, log, will Follow up.
Will increase Lantus to 36u at bedtime, increase Humalog to 9u before each meal,
Will increase Humalog to 8u before each meal, continue Lantus 32u at bedtime
Will increase Lantus to 40u at bedtime, increase Humalog to 10u before each meal, continue coverage scale. Will continue monitoring FS, log, will Follow up.

## 2019-09-11 NOTE — PROGRESS NOTE ADULT - SUBJECTIVE AND OBJECTIVE BOX
Chief complaint  Patient is a 64y old  Male who presents with a chief complaint of cellulitis (11 Sep 2019 09:14)   Review of systems  Patient in bed, looks comfortable, no fever, no hypoglycemia.    Labs and Fingersticks  CAPILLARY BLOOD GLUCOSE      POCT Blood Glucose.: 133 mg/dL (11 Sep 2019 12:03)  POCT Blood Glucose.: 137 mg/dL (11 Sep 2019 07:37)  POCT Blood Glucose.: 147 mg/dL (10 Sep 2019 23:20)  POCT Blood Glucose.: 99 mg/dL (10 Sep 2019 22:10)  POCT Blood Glucose.: 102 mg/dL (10 Sep 2019 21:11)  POCT Blood Glucose.: 292 mg/dL (10 Sep 2019 16:42)      Anion Gap, Serum: 17 (09-11 @ 06:33)  Anion Gap, Serum: 12 (09-10 @ 06:51)      Calcium, Total Serum: 9.0 (09-11 @ 06:33)  Calcium, Total Serum: 9.3 (09-10 @ 12:28)  Calcium, Total Serum: 9.0 (09-10 @ 06:51)          09-11    132<L>  |  92<L>  |  38<H>  ----------------------------<  143<H>  3.7   |  23  |  1.75<H>    Ca    9.0      11 Sep 2019 06:33                          9.8    12.6  )-----------( 457      ( 11 Sep 2019 06:34 )             29.7     Medications  MEDICATIONS  (STANDING):  aspirin enteric coated 81 milliGRAM(s) Oral daily  atorvastatin 80 milliGRAM(s) Oral at bedtime  benzonatate 100 milliGRAM(s) Oral three times a day  dextrose 5%. 1000 milliLiter(s) (50 mL/Hr) IV Continuous <Continuous>  dextrose 50% Injectable 12.5 Gram(s) IV Push once  dextrose 50% Injectable 25 Gram(s) IV Push once  dextrose 50% Injectable 25 Gram(s) IV Push once  diltiazem    milliGRAM(s) Oral daily  docusate sodium 100 milliGRAM(s) Oral two times a day  furosemide   Injectable 40 milliGRAM(s) IV Push two times a day  insulin glargine Injectable (LANTUS) 40 Unit(s) SubCutaneous at bedtime  insulin lispro (HumaLOG) corrective regimen sliding scale   SubCutaneous three times a day before meals  insulin lispro (HumaLOG) corrective regimen sliding scale   SubCutaneous at bedtime  insulin lispro Injectable (HumaLOG) 10 Unit(s) SubCutaneous three times a day before meals  metoprolol succinate ER 75 milliGRAM(s) Oral two times a day  rivaroxaban 15 milliGRAM(s) Oral with dinner  senna 2 Tablet(s) Oral at bedtime  tamsulosin 0.4 milliGRAM(s) Oral at bedtime      Physical Exam  General: Patient comfortable in bed  Vital Signs Last 12 Hrs  T(F): 98.7 (09-11-19 @ 11:19), Max: 98.7 (09-11-19 @ 11:19)  HR: 95 (09-11-19 @ 11:19) (91 - 95)  BP: 118/64 (09-11-19 @ 11:19) (118/64 - 130/67)  BP(mean): --  RR: 18 (09-11-19 @ 11:19) (18 - 18)  SpO2: 100% (09-11-19 @ 11:19) (97% - 100%)  Neck: No palpable thyroid nodules.  CVS: S1S2, No murmurs  Respiratory: No wheezing, no crepitations  GI: Abdomen soft, bowel sounds positive  Musculoskeletal:  edema lower extremities.   Skin: No skin rashes, no ecchymosis    Diagnostics Chief complaint  Patient is a 64y old  Male who presents with a chief complaint of cellulitis (11 Sep 2019 09:14)   Review of systems  Patient in bed, looks comfortable, no fever, no  hypoglycemia.    Labs and Fingersticks  CAPILLARY BLOOD GLUCOSE      POCT Blood Glucose.: 133 mg/dL (11 Sep 2019 12:03)  POCT Blood Glucose.: 137 mg/dL (11 Sep 2019 07:37)  POCT Blood Glucose.: 147 mg/dL (10 Sep 2019 23:20)  POCT Blood Glucose.: 99 mg/dL (10 Sep 2019 22:10)  POCT Blood Glucose.: 102 mg/dL (10 Sep 2019 21:11)  POCT Blood Glucose.: 292 mg/dL (10 Sep 2019 16:42)      Anion Gap, Serum: 17 (09-11 @ 06:33)  Anion Gap, Serum: 12 (09-10 @ 06:51)      Calcium, Total Serum: 9.0 (09-11 @ 06:33)  Calcium, Total Serum: 9.3 (09-10 @ 12:28)  Calcium, Total Serum: 9.0 (09-10 @ 06:51)          09-11    132<L>  |  92<L>  |  38<H>  ----------------------------<  143<H>  3.7   |  23  |  1.75<H>    Ca    9.0      11 Sep 2019 06:33                          9.8    12.6  )-----------( 457      ( 11 Sep 2019 06:34 )             29.7     Medications  MEDICATIONS  (STANDING):  aspirin enteric coated 81 milliGRAM(s) Oral daily  atorvastatin 80 milliGRAM(s) Oral at bedtime  benzonatate 100 milliGRAM(s) Oral three times a day  dextrose 5%. 1000 milliLiter(s) (50 mL/Hr) IV Continuous <Continuous>  dextrose 50% Injectable 12.5 Gram(s) IV Push once  dextrose 50% Injectable 25 Gram(s) IV Push once  dextrose 50% Injectable 25 Gram(s) IV Push once  diltiazem    milliGRAM(s) Oral daily  docusate sodium 100 milliGRAM(s) Oral two times a day  furosemide   Injectable 40 milliGRAM(s) IV Push two times a day  insulin glargine Injectable (LANTUS) 40 Unit(s) SubCutaneous at bedtime  insulin lispro (HumaLOG) corrective regimen sliding scale   SubCutaneous three times a day before meals  insulin lispro (HumaLOG) corrective regimen sliding scale   SubCutaneous at bedtime  insulin lispro Injectable (HumaLOG) 10 Unit(s) SubCutaneous three times a day before meals  metoprolol succinate ER 75 milliGRAM(s) Oral two times a day  rivaroxaban 15 milliGRAM(s) Oral with dinner  senna 2 Tablet(s) Oral at bedtime  tamsulosin 0.4 milliGRAM(s) Oral at bedtime      Physical Exam  General: Patient comfortable in bed  Vital Signs Last 12 Hrs  T(F): 98.7 (09-11-19 @ 11:19), Max: 98.7 (09-11-19 @ 11:19)  HR: 95 (09-11-19 @ 11:19) (91 - 95)  BP: 118/64 (09-11-19 @ 11:19) (118/64 - 130/67)  BP(mean): --  RR: 18 (09-11-19 @ 11:19) (18 - 18)  SpO2: 100% (09-11-19 @ 11:19) (97% - 100%)  Neck: No palpable thyroid nodules.  CVS: S1S2, No murmurs  Respiratory: No wheezing, no crepitations  GI: Abdomen soft, bowel sounds positive  Musculoskeletal:  edema lower extremities.   Skin: No skin rashes, no ecchymosis    Diagnostics

## 2019-09-11 NOTE — PROGRESS NOTE ADULT - PROBLEM SELECTOR PROBLEM 2
Cellulitis of right lower extremity

## 2019-09-11 NOTE — DISCHARGE NOTE NURSING/CASE MANAGEMENT/SOCIAL WORK - PATIENT PORTAL LINK FT
You can access the FollowMyHealth Patient Portal offered by [facility] by registering at the following website: [email or web address]. By joining [employer]’s FollowMyHealth portal, you will also be able to view your health information using other applications (apps) compatible with our system.

## 2019-09-11 NOTE — PROGRESS NOTE ADULT - SUBJECTIVE AND OBJECTIVE BOX
No pain, no shortness of breath      VITAL:  T(C): , Max: 37.8 (09-10-19 @ 19:55)  T(F): , Max: 100 (09-10-19 @ 19:55)  HR: 95 (09-11-19 @ 11:19)  BP: 118/64 (09-11-19 @ 11:19)  BP(mean): --  RR: 18 (09-11-19 @ 11:19)  SpO2: 100% (09-11-19 @ 11:19)      PHYSICAL EXAM:  Constitutional: NAD; Alert  HEENT:  NCAT; DMM  Neck: No JVD; supple  Respiratory: CTA-b/l  Cardiac: RRR s1s2  Gastrointestinal: BS+, soft, NT/ND  Urologic: No moreno; (+)scrotal swelling  Extremities: L>RLE edema  Back: No CVAT b/l      LABS:                        8.9    10.3  )-----------( 440      ( 11 Sep 2019 13:42 )             27.1     Na(134)/K(4.2)/Cl(95)/HCO3(24)/BUN(36)/Cr(1.67)Glu(234)/Ca(8.6)/Mg(--)/PO4(--)    09-11 @ 13:42  Na(132)/K(3.7)/Cl(92)/HCO3(23)/BUN(38)/Cr(1.75)Glu(143)/Ca(9.0)/Mg(--)/PO4(--)    09-11 @ 06:33  Na(124)/K(4.4)/Cl(88)/HCO3(24)/BUN(36)/Cr(1.69)Glu(258)/Ca(9.3)/Mg(--)/PO4(--)    09-10 @ 12:28  Na(129)/K(4.4)/Cl(93)/HCO3(24)/BUN(37)/Cr(1.72)Glu(200)/Ca(9.0)/Mg(--)/PO4(--)    09-10 @ 06:51  Na(126)/K(4.6)/Cl(88)/HCO3(22)/BUN(34)/Cr(1.69)Glu(219)/Ca(9.8)/Mg(--)/PO4(--)    09-09 @ 07:15      ASSESSMENT: 64M with PMHx of HTN, HLD, T2DM, DM neuropathy, afib, and CAD, 8/29/19 a/w RLE cellulitis    (1)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

## 2019-09-11 NOTE — PROGRESS NOTE ADULT - NSHPATTENDINGPLANDISCUSS_GEN_ALL_CORE
pt , np , cardio
cardio , pt , np , CV
pt   family    and np
pt,  cardio , np and renal
pt, family at bedside , Dr. Walton and NP
pt, np , cardio and vasc
pt, np,   wife , Dr. Santana and EP
pt, nurse , wife on phone and NP
pt , np , cardio and ep

## 2019-09-11 NOTE — PROGRESS NOTE ADULT - ASSESSMENT
Assessment /PLAN:  DMT2: 64y Male with DM T2 with hyperglycemia, FS trending within acceptable range today, no hypoglycemia. S/P transfusion last night for decreasing hgb, eating full meals, comfortable.  Cellulitis Left leg: On antibiotics, stable. States the leg pain and edema has improved.  CAD: On medications, no chest pain, stable, monitored  HTN: Controlled,  on antihypertensive medications. Assessment /PLAN:  DMT2: 64y Male with DM T2 with hyperglycemia, FS trending within acceptable range today,  no hypoglycemia. S/P transfusion last night for decreasing hgb, eating full meals, comfortable.  Cellulitis Left leg: On antibiotics, stable. States the leg pain and edema has improved.  CAD: On medications, no chest pain, stable, monitored  HTN: Controlled,  on antihypertensive medications.

## 2019-09-11 NOTE — PROGRESS NOTE ADULT - PROBLEM SELECTOR PLAN 2
Suggest to continue medications, monitoring, FU primary team recommendations. .
Suggest to continue medications, monitoring, FU primary team recommendations. .
Suggest to continue medications, monitoring, FU primary team recommendations.
Suggest to continue medications, monitoring, FU primary team recommendations. .

## 2019-09-11 NOTE — PROGRESS NOTE ADULT - PROBLEM SELECTOR PLAN 3
On medications,  no chest pain, stable, monitored and followed up by primary  team/cardiology team

## 2019-09-11 NOTE — PROGRESS NOTE ADULT - ASSESSMENT
Patient with anemia.  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  The Hgb dropped and he was transfused overnight.  CT thigh noted and he does have bleeding in psoas muscle.  Transfusional support.  Care per primary team.  prior hemolytic labs were negative.  had dark brown stool so will order a stool for occult blood.    Mild leucocytosis and thrombocytosis for now suspected to be reactive and would monitor for now

## 2019-09-11 NOTE — PROGRESS NOTE ADULT - SUBJECTIVE AND OBJECTIVE BOX
Patient is a 64y old  Male who presents with a chief complaint of cellulitis (11 Sep 2019 09:14)                                                               INTERVAL HPI/OVERNIGHT EVENTS:    REVIEW OF SYSTEMS:     CONSTITUTIONAL: No weakness, fevers or chills  RESPIRATORY: No cough, wheezing,  No shortness of breath  CARDIOVASCULAR: No chest pain or palpitations  GASTROINTESTINAL: No abdominal pain  . No nausea, vomiting, or hematemesis; No diarrhea or constipation. No melena or hematochezia.  GENITOURINARY: No dysuria, frequency or hematuria  NEUROLOGICAL: No numbness or weakness  SKIN: ecchymosis /swelling of LE                                                                                                                                                                                                                                                                               Medications:  MEDICATIONS  (STANDING):  aspirin enteric coated 81 milliGRAM(s) Oral daily  atorvastatin 80 milliGRAM(s) Oral at bedtime  benzonatate 100 milliGRAM(s) Oral three times a day  dextrose 5%. 1000 milliLiter(s) (50 mL/Hr) IV Continuous <Continuous>  dextrose 50% Injectable 12.5 Gram(s) IV Push once  dextrose 50% Injectable 25 Gram(s) IV Push once  dextrose 50% Injectable 25 Gram(s) IV Push once  diltiazem    milliGRAM(s) Oral daily  docusate sodium 100 milliGRAM(s) Oral two times a day  furosemide   Injectable 40 milliGRAM(s) IV Push two times a day  insulin glargine Injectable (LANTUS) 40 Unit(s) SubCutaneous at bedtime  insulin lispro (HumaLOG) corrective regimen sliding scale   SubCutaneous three times a day before meals  insulin lispro (HumaLOG) corrective regimen sliding scale   SubCutaneous at bedtime  insulin lispro Injectable (HumaLOG) 10 Unit(s) SubCutaneous three times a day before meals  metoprolol succinate ER 75 milliGRAM(s) Oral two times a day  rivaroxaban 15 milliGRAM(s) Oral with dinner  senna 2 Tablet(s) Oral at bedtime  tamsulosin 0.4 milliGRAM(s) Oral at bedtime    MEDICATIONS  (PRN):  acetaminophen   Tablet .. 650 milliGRAM(s) Oral every 6 hours PRN Temp greater or equal to 38C (100.4F)  acetaminophen   Tablet .. 650 milliGRAM(s) Oral every 6 hours PRN Mild Pain (1 - 3)  benzocaine 15 mG/menthol 3.6 mG (Sugar-Free) Lozenge 1 Lozenge Oral four times a day PRN Sore Throat  dextrose 40% Gel 15 Gram(s) Oral once PRN Blood Glucose LESS THAN 70 milliGRAM(s)/deciliter  glucagon  Injectable 1 milliGRAM(s) IntraMuscular once PRN Glucose LESS THAN 70 milligrams/deciliter  guaiFENesin   Syrup  (Sugar-Free) 100 milliGRAM(s) Oral every 6 hours PRN Cough       Allergies    No Known Allergies    Intolerances      Vital Signs Last 24 Hrs  T(C): 37.1 (11 Sep 2019 11:19), Max: 37.8 (10 Sep 2019 19:55)  T(F): 98.7 (11 Sep 2019 11:19), Max: 100 (10 Sep 2019 19:55)  HR: 95 (11 Sep 2019 11:19) (81 - 102)  BP: 118/64 (11 Sep 2019 11:19) (111/63 - 130/67)  BP(mean): --  RR: 18 (11 Sep 2019 11:19) (18 - 18)  SpO2: 100% (11 Sep 2019 11:19) (97% - 100%)  CAPILLARY BLOOD GLUCOSE      POCT Blood Glucose.: 133 mg/dL (11 Sep 2019 12:03)  POCT Blood Glucose.: 137 mg/dL (11 Sep 2019 07:37)  POCT Blood Glucose.: 147 mg/dL (10 Sep 2019 23:20)  POCT Blood Glucose.: 99 mg/dL (10 Sep 2019 22:10)  POCT Blood Glucose.: 102 mg/dL (10 Sep 2019 21:11)  POCT Blood Glucose.: 292 mg/dL (10 Sep 2019 16:42)      09-10 @ 07:01 - 09-11 @ 07:00  --------------------------------------------------------  IN: 400 mL / OUT: 1920 mL / NET: -1520 mL    09-11 @ 07:01 - 09-11 @ 16:21  --------------------------------------------------------  IN: 600 mL / OUT: 300 mL / NET: 300 mL      Physical Exam:    General:  Well appearing, NAD, not cachetic  HEENT:  Nonicteric, PERRLA  CV:  RRR, S1S2   Lungs:  CTA B/L, no wheezes, rales, rhonchi  Abdomen:  Soft, non-tender, no distended, positive BS  Extremities:  edema  LLE > R   ecchymsois of thigh  stable   Skin:  Warm and dry, no rashes  :  No moreno  Neuro:  AAOx3, non-focal, grossly intact                                                                                                                                                                                                                                                                                                LABS:                               8.9    10.3  )-----------( 440      ( 11 Sep 2019 13:42 )             27.1                      09-11    134<L>  |  95<L>  |  36<H>  ----------------------------<  234<H>  4.2   |  24  |  1.67<H>    Ca    8.6      11 Sep 2019 13:42                         Time spent :

## 2019-09-11 NOTE — PROGRESS NOTE ADULT - SUBJECTIVE AND OBJECTIVE BOX
Resting comfortably  No dyspnea or chest pain  S/P 2 units of PRBC's last night  /67  HR 92  Lungs clear  Irregular rhythm 1/6 systolic murmur  No edema  Left thigh cellulitis has resolved but the area remains firm    WBC 12.6  Hgb 9.8  Hct 29.7  Plt 457K  BUN 38  Crt 1.75  Na 132    Imp:  Stable from a CV standpoint  No CHF and the AF rate is controlled  Serum Na is improved  Plan:  Continue present care  Lasix dosing as per Renal

## 2019-09-11 NOTE — PROGRESS NOTE ADULT - SUBJECTIVE AND OBJECTIVE BOX
Patient is a 64y old  Male who presents with a chief complaint of cellulitis (11 Sep 2019 09:14)    Some discomfort in left thigh otherwise feels okay.  Had a BM and it was dark brown.  No BRBPR    Denies fevers, chills, sweats, HA, dizziness, nosebleeds, sore throat, CP, SOB, cough, hemoptysis, N/V/D, dysuria, hematuria      Medication:   acetaminophen   Tablet .. 650 milliGRAM(s) Oral every 6 hours PRN  acetaminophen   Tablet .. 650 milliGRAM(s) Oral every 6 hours PRN  aspirin enteric coated 81 milliGRAM(s) Oral daily  atorvastatin 80 milliGRAM(s) Oral at bedtime  benzocaine 15 mG/menthol 3.6 mG (Sugar-Free) Lozenge 1 Lozenge Oral four times a day PRN  benzonatate 100 milliGRAM(s) Oral three times a day  dextrose 40% Gel 15 Gram(s) Oral once PRN  dextrose 5%. 1000 milliLiter(s) IV Continuous <Continuous>  dextrose 50% Injectable 12.5 Gram(s) IV Push once  dextrose 50% Injectable 25 Gram(s) IV Push once  dextrose 50% Injectable 25 Gram(s) IV Push once  diltiazem    milliGRAM(s) Oral daily  docusate sodium 100 milliGRAM(s) Oral two times a day  furosemide   Injectable 40 milliGRAM(s) IV Push two times a day  glucagon  Injectable 1 milliGRAM(s) IntraMuscular once PRN  guaiFENesin   Syrup  (Sugar-Free) 100 milliGRAM(s) Oral every 6 hours PRN  insulin glargine Injectable (LANTUS) 40 Unit(s) SubCutaneous at bedtime  insulin lispro (HumaLOG) corrective regimen sliding scale   SubCutaneous three times a day before meals  insulin lispro (HumaLOG) corrective regimen sliding scale   SubCutaneous at bedtime  insulin lispro Injectable (HumaLOG) 10 Unit(s) SubCutaneous three times a day before meals  metoprolol succinate ER 75 milliGRAM(s) Oral two times a day  rivaroxaban 15 milliGRAM(s) Oral with dinner  senna 2 Tablet(s) Oral at bedtime  tamsulosin 0.4 milliGRAM(s) Oral at bedtime      Physical exam    T(C): 36.8 (09-11-19 @ 05:02), Max: 37.8 (09-10-19 @ 19:55)  HR: 92 (09-11-19 @ 05:02) (81 - 102)  BP: 130/67 (09-11-19 @ 05:02) (111/63 - 130/67)  RR: 18 (09-11-19 @ 05:02) (18 - 18)  SpO2: 97% (09-11-19 @ 05:02) (97% - 100%)  Wt(kg): --    alert NAD  EOMI anicteric sclera  Neck No LNA  Cv s1 S2 RRR  Lungs clear B/L  abd soft NT ND +BS  No LE edema or tenderness    Labs                      9.8    12.6  )-----------( 457      ( 11 Sep 2019 06:34 )             29.7       09-11    132<L>  |  92<L>  |  38<H>  ----------------------------<  143<H>  3.7   |  23  |  1.75<H>    Ca    9.0      11 Sep 2019 06:33            [id number]

## 2019-09-11 NOTE — PROGRESS NOTE ADULT - PROBLEM SELECTOR PROBLEM 1
Type 2 diabetes mellitus

## 2019-09-11 NOTE — PROGRESS NOTE ADULT - ASSESSMENT
64M with PMHx of CAD (post-three stents), HTN, HLD, T2DM (complicated by peripheral neuropathy), afib on AC presenting with Le cellulitis /fever "not responding to abx " as o/p.  Pt was discharged last week  and since then noted some swelling in RLE and occasional pain... three days ago was seen in ED and was found to have cellulitis of RLE and sent on keflex... yesterday pt had a follow up with cardio and was found to have fever and was told to come to ER however he only reported to Ed today..  still c./o pain in foot though seems to be less..  erythema with some improvement   no N/V/d   no truama   and no hx of gout   in ED was found to have FS>600   met sepsis criteria   BARRY  w CR of 1.7   hyponatremia of sodium 130     acute bleed / L thigh hematomoa :  now with slowly trending down Hb.. s/p transfusion of 1 prbc for acute on chronic anemia which is multifactorial      cont to monitor H/H as o/p twice weekly for now   Repeat CT with mild hematoma      hyponatremia :  improved   marjan hypervolumic     d/w Dr. Walton : cont lasix and monitor for now       BARRY : cont to hold ACE for now   f/u with renal as o/p..  stable Cr for now                                                                                                                                                                                                                                                                             Afib with RVR : - DCCV cancelled due to finding of dense smoke/ severe spontaneous echo contrast w/ early thrombus seen in the LAAA on YVES   - Continue rate control strategy w/  lopressor to 50mg BID and Cardizem CD 180mg   - [id number] Vasc score of 3; Continue w/ Xarelto.      If patient cannot tolerate Xarelto, i.e if he has significant bleed would possibly benefit from Watchman procedure which was discussed with patient.                d/w EP : will increase BB and monitor   needs f/u as o/p.                                                                                                                                                                                         HTN cont bp meds and monitor      DM: uncontrolled  A1c 8.7  FS >600   now improved with humalog   f/u with endo                                              sepsis sec to cellulitis :  blood culture  neg  completed course of abx   doubt DVT  .. US done two days ago neg ...pt on AC   clinically improved however with leukocytosis ??  reactive sec to acute bleed?   d/w Dr. Brief  : monitor for now                                              cough :   unclear if residual from ifection however seems to coincide with starting aCE   will hold and re-evaluate as o/p.         scroatl swelling  :   minimal ecchymosis     US negative    will dc with close f/u as o/p   monitor for bleed and monitor bmp   f/u w bmp

## 2019-09-11 NOTE — PROGRESS NOTE ADULT - PROBLEM SELECTOR PLAN 1
Will continue current insulin regimen. Will continue monitoring FS, log, will Follow up.  Patient counseled for compliance with consistent low carb diet.

## 2019-09-11 NOTE — PROGRESS NOTE ADULT - PROVIDER SPECIALTY LIST ADULT
Cardiology
Electrophysiology
Endocrinology
Heme/Onc
Infectious Disease
Internal Medicine
Nephrology
Vascular Cardiology
Vascular Surgery
Electrophysiology
Internal Medicine
Electrophysiology
Endocrinology
Internal Medicine
Cardiology
Cardiology
Endocrinology
Internal Medicine
Endocrinology

## 2019-09-13 ENCOUNTER — INBOUND DOCUMENT (OUTPATIENT)
Age: 64
End: 2019-09-13

## 2019-09-13 PROBLEM — I48.91 UNSPECIFIED ATRIAL FIBRILLATION: Chronic | Status: ACTIVE | Noted: 2019-08-29

## 2019-09-17 ENCOUNTER — NON-APPOINTMENT (OUTPATIENT)
Age: 64
End: 2019-09-17

## 2019-09-17 ENCOUNTER — APPOINTMENT (OUTPATIENT)
Dept: ELECTROPHYSIOLOGY | Facility: CLINIC | Age: 64
End: 2019-09-17
Payer: COMMERCIAL

## 2019-09-17 VITALS
HEART RATE: 89 BPM | WEIGHT: 146 LBS | DIASTOLIC BLOOD PRESSURE: 74 MMHG | HEIGHT: 66 IN | BODY MASS INDEX: 23.46 KG/M2 | OXYGEN SATURATION: 99 % | SYSTOLIC BLOOD PRESSURE: 138 MMHG

## 2019-09-17 DIAGNOSIS — Z82.49 FAMILY HISTORY OF ISCHEMIC HEART DISEASE AND OTHER DISEASES OF THE CIRCULATORY SYSTEM: ICD-10-CM

## 2019-09-17 DIAGNOSIS — R60.0 LOCALIZED EDEMA: ICD-10-CM

## 2019-09-17 PROCEDURE — 93000 ELECTROCARDIOGRAM COMPLETE: CPT

## 2019-09-17 PROCEDURE — 99215 OFFICE O/P EST HI 40 MIN: CPT

## 2019-09-17 RX ORDER — RIVAROXABAN 15 MG/1
15 TABLET, FILM COATED ORAL
Qty: 90 | Refills: 1 | Status: ACTIVE | COMMUNITY

## 2019-09-17 RX ORDER — INSULIN GLARGINE 100 [IU]/ML
INJECTION, SOLUTION SUBCUTANEOUS
Refills: 0 | Status: ACTIVE | COMMUNITY

## 2019-09-17 RX ORDER — BECLOMETHASONE DIPROPIONATE HFA 80 UG/1
80 AEROSOL, METERED RESPIRATORY (INHALATION)
Refills: 0 | Status: ACTIVE | COMMUNITY

## 2019-09-17 RX ORDER — INSULIN LISPRO 100 [IU]/ML
INJECTION, SOLUTION INTRAVENOUS; SUBCUTANEOUS
Refills: 0 | Status: ACTIVE | COMMUNITY

## 2019-09-17 RX ORDER — DILTIAZEM HYDROCHLORIDE 180 MG/1
180 CAPSULE, EXTENDED RELEASE ORAL
Qty: 90 | Refills: 1 | Status: ACTIVE | COMMUNITY

## 2019-09-18 NOTE — PHYSICAL EXAM
[General Appearance - Well Developed] : well developed [Normal Appearance] : normal appearance [Well Groomed] : well groomed [General Appearance - Well Nourished] : well nourished [No Deformities] : no deformities [General Appearance - In No Acute Distress] : no acute distress [Normal Conjunctiva] : the conjunctiva exhibited no abnormalities [Normal Oral Mucosa] : normal oral mucosa [Eyelids - No Xanthelasma] : the eyelids demonstrated no xanthelasmas [No Oral Pallor] : no oral pallor [Normal Jugular Venous A Waves Present] : normal jugular venous A waves present [No Oral Cyanosis] : no oral cyanosis [Normal Jugular Venous V Waves Present] : normal jugular venous V waves present [No Jugular Venous Muller A Waves] : no jugular venous muller A waves [Heart Sounds] : normal S1 and S2 [Irregularly Irregular] : the rhythm was irregularly irregular [Systolic grade ___/6] : A grade [unfilled]/6 systolic murmur was heard. [Respiration, Rhythm And Depth] : normal respiratory rhythm and effort [Exaggerated Use Of Accessory Muscles For Inspiration] : no accessory muscle use [Auscultation Breath Sounds / Voice Sounds] : lungs were clear to auscultation bilaterally [Abdomen Soft] : soft [Abdomen Tenderness] : non-tender [Abdomen Mass (___ Cm)] : no abdominal mass palpated [Abnormal Walk] : normal gait [Gait - Sufficient For Exercise Testing] : the gait was sufficient for exercise testing [Nail Clubbing] : no clubbing of the fingernails [Petechial Hemorrhages (___cm)] : no petechial hemorrhages [Cyanosis, Localized] : no localized cyanosis [] : no ischemic changes [FreeTextEntry1] : resolving left leg hematoma [Oriented To Time, Place, And Person] : oriented to person, place, and time [Affect] : the affect was normal [Mood] : the mood was normal [No Anxiety] : not feeling anxious

## 2019-09-18 NOTE — HISTORY OF PRESENT ILLNESS
[FreeTextEntry1] : His legs remain swollen.  No palpitations.  No lightheadedness/dizziness.  No chest pains.  He has been walking but has to stop for rest.

## 2019-09-18 NOTE — DISCUSSION/SUMMARY
[FreeTextEntry1] : 64 year old man with recurrent persistent atrial fibrillation. A recent YVES demonstrate possible early thrombus formation.  This is of particular concern given that he is not a good candidate for long term anticoagulation.  That is he has had problems with hematuria in the past and more recently with spontaneous hematoma of left leg.  We discussed timing for a repeat YVES to rule out thrombus  Should this be the case I would favor cardioversion and consideration for antiarrhythmic therapy to help maintain sinus, ie amio.  We can then consider for JOSE occlusion with Watchman as well as possibly ablation.  All of his questions were answered and the procedure will be arranged.

## 2019-09-23 ENCOUNTER — INPATIENT (INPATIENT)
Facility: HOSPITAL | Age: 64
LOS: 2 days | Discharge: HOME | DRG: 291 | End: 2019-09-26
Attending: GENERAL ACUTE CARE HOSPITAL | Admitting: GENERAL ACUTE CARE HOSPITAL
Payer: COMMERCIAL

## 2019-09-23 VITALS
TEMPERATURE: 98 F | DIASTOLIC BLOOD PRESSURE: 64 MMHG | HEART RATE: 76 BPM | OXYGEN SATURATION: 99 % | RESPIRATION RATE: 18 BRPM | HEIGHT: 66 IN | WEIGHT: 160.06 LBS | SYSTOLIC BLOOD PRESSURE: 138 MMHG

## 2019-09-23 LAB
ALBUMIN SERPL ELPH-MCNC: 4.2 G/DL — SIGNIFICANT CHANGE UP (ref 3.3–5)
ALP SERPL-CCNC: 168 U/L — HIGH (ref 40–120)
ALT FLD-CCNC: 40 U/L — SIGNIFICANT CHANGE UP (ref 10–45)
ANION GAP SERPL CALC-SCNC: 14 MMOL/L — SIGNIFICANT CHANGE UP (ref 5–17)
APTT BLD: 46.1 SEC — HIGH (ref 27.5–36.3)
AST SERPL-CCNC: 46 U/L — HIGH (ref 10–40)
BASOPHILS # BLD AUTO: 0.1 K/UL — SIGNIFICANT CHANGE UP (ref 0–0.2)
BASOPHILS NFR BLD AUTO: 0.5 % — SIGNIFICANT CHANGE UP (ref 0–2)
BILIRUB SERPL-MCNC: 1.7 MG/DL — HIGH (ref 0.2–1.2)
BUN SERPL-MCNC: 20 MG/DL — SIGNIFICANT CHANGE UP (ref 7–23)
CALCIUM SERPL-MCNC: 9.7 MG/DL — SIGNIFICANT CHANGE UP (ref 8.4–10.5)
CHLORIDE SERPL-SCNC: 96 MMOL/L — SIGNIFICANT CHANGE UP (ref 96–108)
CK MB BLD-MCNC: 1.3 % — SIGNIFICANT CHANGE UP (ref 0–3.5)
CK MB CFR SERPL CALC: 4.3 NG/ML — SIGNIFICANT CHANGE UP (ref 0–6.7)
CK SERPL-CCNC: 324 U/L — HIGH (ref 30–200)
CO2 SERPL-SCNC: 26 MMOL/L — SIGNIFICANT CHANGE UP (ref 22–31)
CREAT SERPL-MCNC: 1.47 MG/DL — HIGH (ref 0.5–1.3)
EOSINOPHIL # BLD AUTO: 0.5 K/UL — SIGNIFICANT CHANGE UP (ref 0–0.5)
EOSINOPHIL NFR BLD AUTO: 3.4 % — SIGNIFICANT CHANGE UP (ref 0–6)
GLUCOSE SERPL-MCNC: 128 MG/DL — HIGH (ref 70–99)
HCT VFR BLD CALC: 34.1 % — LOW (ref 39–50)
HGB BLD-MCNC: 10.6 G/DL — LOW (ref 13–17)
INR BLD: 2.79 RATIO — HIGH (ref 0.88–1.16)
LYMPHOCYTES # BLD AUTO: 14.7 % — SIGNIFICANT CHANGE UP (ref 13–44)
LYMPHOCYTES # BLD AUTO: 2.1 K/UL — SIGNIFICANT CHANGE UP (ref 1–3.3)
MAGNESIUM SERPL-MCNC: 2.3 MG/DL — SIGNIFICANT CHANGE UP (ref 1.6–2.6)
MCHC RBC-ENTMCNC: 29.1 PG — SIGNIFICANT CHANGE UP (ref 27–34)
MCHC RBC-ENTMCNC: 31 GM/DL — LOW (ref 32–36)
MCV RBC AUTO: 93.7 FL — SIGNIFICANT CHANGE UP (ref 80–100)
MONOCYTES # BLD AUTO: 1.1 K/UL — HIGH (ref 0–0.9)
MONOCYTES NFR BLD AUTO: 7.7 % — SIGNIFICANT CHANGE UP (ref 2–14)
NEUTROPHILS # BLD AUTO: 10.3 K/UL — HIGH (ref 1.8–7.4)
NEUTROPHILS NFR BLD AUTO: 73.8 % — SIGNIFICANT CHANGE UP (ref 43–77)
NT-PROBNP SERPL-SCNC: 3918 PG/ML — HIGH (ref 0–300)
PHOSPHATE SERPL-MCNC: 3.7 MG/DL — SIGNIFICANT CHANGE UP (ref 2.5–4.5)
PLATELET # BLD AUTO: 409 K/UL — HIGH (ref 150–400)
POTASSIUM SERPL-MCNC: 4.2 MMOL/L — SIGNIFICANT CHANGE UP (ref 3.5–5.3)
POTASSIUM SERPL-SCNC: 4.2 MMOL/L — SIGNIFICANT CHANGE UP (ref 3.5–5.3)
PROT SERPL-MCNC: 7.3 G/DL — SIGNIFICANT CHANGE UP (ref 6–8.3)
PROTHROM AB SERPL-ACNC: 32.8 SEC — HIGH (ref 10–12.9)
RBC # BLD: 3.64 M/UL — LOW (ref 4.2–5.8)
RBC # FLD: 16.8 % — HIGH (ref 10.3–14.5)
SODIUM SERPL-SCNC: 136 MMOL/L — SIGNIFICANT CHANGE UP (ref 135–145)
TROPONIN T, HIGH SENSITIVITY RESULT: 27 NG/L — SIGNIFICANT CHANGE UP (ref 0–51)
WBC # BLD: 14 K/UL — HIGH (ref 3.8–10.5)
WBC # FLD AUTO: 14 K/UL — HIGH (ref 3.8–10.5)

## 2019-09-23 PROCEDURE — 93010 ELECTROCARDIOGRAM REPORT: CPT

## 2019-09-23 PROCEDURE — 99285 EMERGENCY DEPT VISIT HI MDM: CPT

## 2019-09-23 PROCEDURE — 71046 X-RAY EXAM CHEST 2 VIEWS: CPT | Mod: 26

## 2019-09-23 NOTE — ED PROVIDER NOTE - RAPID ASSESSMENT
64 year year old male with pmhx Afib, hypertension, hyperlipidemia, TIIDM, CAD w/ x3 stents and pshx carotid endarterectomy presents to the ED c/o SOB, cough. Pt has been taking Xarelto for Afib and was prescribed with 20mg Lasix 2x/day due to lower extremity swelling. Pt was previously in the hospital and was given Lasix injections before being released on 9/11. PMD - Dennis PÉREZ). Pt found to have pulmonary edema to auscultation and has been complaining of orthopnea x2 days. Denies CP, fever, abdominal pain, n/v. Experienced dyspnea and coughing while walking down block today and presents to [facility] ED for IV Lasix. Also has unintentionally gained 12 pounds over the last x1 week.

## 2019-09-23 NOTE — ED PROVIDER NOTE - NS ED ROS FT
Constitutional: no fevers or chills  HEENT: no visual changes, no sore throat, no rhinorrhea  CV: no cp or palpitations  Resp: +CLEARY, +Cough;  GI: no abd pain, no nausea, no vomiting, no diarrhea, no constipation  : no dysuria, no hematuria  MSK: no myalgais or arthralgias; b/l lower leg swelling  skin: no rashes  neuro: no HA, no confusion; no numbness; no weakness, no tingling  psych: no SI/HI  heme: no LAD

## 2019-09-23 NOTE — ED PROVIDER NOTE - ATTENDING CONTRIBUTION TO CARE
64 year year old male with pmhx Afib, hypertension, hyperlipidemia, TIIDM, CAD w/ x3 stents and pshx carotid endarterectomy presents to the ED c/o SOB, cough sent in by his cards for admission for chf lasix given, with sats 92% ra, leg swelling to admit for chf exceration, lasix given. 64 year year old male with pmhx Afib, hypertension, hyperlipidemia, TIIDM, CAD w/ x3 stents and pshx carotid endarterectomy presents to the ED c/o SOB, cough sent in by his cards for admission for chf lasix given, with sats 92% ra, leg swelling to admit for chf exceration, lasix given.    the ACP/scribe were used for the rapid assessment/qdoc note

## 2019-09-23 NOTE — ED PROVIDER NOTE - PHYSICAL EXAMINATION
PHYSICAL EXAM:  GENERAL: non-toxic appearing; in no respiratory distress  HEAD: Atraumatic, Normocephalic  EYES: PERRL, EOMs intact b/l w/out deficits  ENMT: Moist membranes, no anterior/posterior, or supraclavicular LAD  CHEST/LUNG: In no acute respiratory distress; bibasilar crackles on exam, otherwise clear lungs  HEART: RRR no murmur/gallops/rubs  ABDOMEN: +BS, soft, NT, ND  EXTREMITIES: 2+ pitting edema in lower extremities  MUSCULOSKELETAL: FROM of all 4 extremities;  NERVOUS SYSTEM:  A&Ox3, No motor deficits or sensory deficits; CNII-XII intact; no focal neurologic deficits  Heme/LYMPH: No ecchymosis or bruising or LAD  SKIN:  No new rashes

## 2019-09-23 NOTE — ED PROVIDER NOTE - PROGRESS NOTE DETAILS
pt given IV lasix 40. spoke w/ dr. dutta, who accepts pt for admission. pt comfortable at this time.

## 2019-09-23 NOTE — ED PROVIDER NOTE - OBJECTIVE STATEMENT
63 yo M PMHx AFib on xarelto, DMII, HLD, CAD x3 stents, PSHx left carotid endarterectomy, presents to ED c/o worsening dyspnea on exertion. Pt states he normally is able to walk 3-5 blocks w/o getting SOB however, now, even walking a small distance he has SOB. Pt also c/o b/l lower leg swelling. Pt has orthopnea and cough. Denies CP, n/v, PND. Pt sent in by  Dr. PARRA 65 yo M PMHx AFib on xarelto, DMII, HLD, CAD x3 stents, PSHx left carotid endarterectomy, presents to ED c/o worsening dyspnea on exertion. Pt states he normally is able to walk 3-5 blocks w/o getting SOB however, now, even walking a small distance he has SOB. Pt also c/o b/l lower leg swelling. Pt has orthopnea and cough. Denies CP, n/v, PND. Pt sent in by his PMD Dr. Kumar w/ request to admit to Dr. Mcdaniel.

## 2019-09-23 NOTE — ED PROVIDER NOTE - CLINICAL SUMMARY MEDICAL DECISION MAKING FREE TEXT BOX
See Attending Note See Attending Note    Tay Barillas PGY1 63 y/o M PMHx afib on xarelto, DMII, stents x3, pshx left carotid endarterectomy, presents for worsenign CLEARY and orthopnea. Pt sent in by his PMD for admission and IV diuresis. Exam shows crackles at bases w/ 2+ pitting edema. Likely CHF exacerbation. will send for labs, including bnp, trop, cxr, ekg and plan to admit to Dr. Mcdaniel, as requested by pt's pmd.

## 2019-09-24 DIAGNOSIS — I50.9 HEART FAILURE, UNSPECIFIED: ICD-10-CM

## 2019-09-24 DIAGNOSIS — E11.9 TYPE 2 DIABETES MELLITUS WITHOUT COMPLICATIONS: ICD-10-CM

## 2019-09-24 DIAGNOSIS — I10 ESSENTIAL (PRIMARY) HYPERTENSION: ICD-10-CM

## 2019-09-24 DIAGNOSIS — Z29.9 ENCOUNTER FOR PROPHYLACTIC MEASURES, UNSPECIFIED: ICD-10-CM

## 2019-09-24 DIAGNOSIS — I25.10 ATHEROSCLEROTIC HEART DISEASE OF NATIVE CORONARY ARTERY WITHOUT ANGINA PECTORIS: ICD-10-CM

## 2019-09-24 DIAGNOSIS — N18.9 CHRONIC KIDNEY DISEASE, UNSPECIFIED: ICD-10-CM

## 2019-09-24 DIAGNOSIS — E78.5 HYPERLIPIDEMIA, UNSPECIFIED: ICD-10-CM

## 2019-09-24 DIAGNOSIS — I48.91 UNSPECIFIED ATRIAL FIBRILLATION: ICD-10-CM

## 2019-09-24 DIAGNOSIS — I50.31 ACUTE DIASTOLIC (CONGESTIVE) HEART FAILURE: ICD-10-CM

## 2019-09-24 LAB
GLUCOSE BLDC GLUCOMTR-MCNC: 120 MG/DL — HIGH (ref 70–99)
GLUCOSE BLDC GLUCOMTR-MCNC: 145 MG/DL — HIGH (ref 70–99)
GLUCOSE BLDC GLUCOMTR-MCNC: 202 MG/DL — HIGH (ref 70–99)
GLUCOSE BLDC GLUCOMTR-MCNC: 214 MG/DL — HIGH (ref 70–99)
GLUCOSE BLDC GLUCOMTR-MCNC: 66 MG/DL — LOW (ref 70–99)
GLUCOSE BLDC GLUCOMTR-MCNC: 75 MG/DL — SIGNIFICANT CHANGE UP (ref 70–99)
TROPONIN T, HIGH SENSITIVITY RESULT: 26 NG/L — SIGNIFICANT CHANGE UP (ref 0–51)

## 2019-09-24 PROCEDURE — 99223 1ST HOSP IP/OBS HIGH 75: CPT

## 2019-09-24 RX ORDER — DOCUSATE SODIUM 100 MG
100 CAPSULE ORAL
Refills: 0 | Status: DISCONTINUED | OUTPATIENT
Start: 2019-09-24 | End: 2019-09-26

## 2019-09-24 RX ORDER — INSULIN GLARGINE 100 [IU]/ML
28 INJECTION, SOLUTION SUBCUTANEOUS AT BEDTIME
Refills: 0 | Status: DISCONTINUED | OUTPATIENT
Start: 2019-09-24 | End: 2019-09-25

## 2019-09-24 RX ORDER — ASPIRIN/CALCIUM CARB/MAGNESIUM 324 MG
81 TABLET ORAL DAILY
Refills: 0 | Status: DISCONTINUED | OUTPATIENT
Start: 2019-09-24 | End: 2019-09-26

## 2019-09-24 RX ORDER — INSULIN LISPRO 100/ML
VIAL (ML) SUBCUTANEOUS AT BEDTIME
Refills: 0 | Status: DISCONTINUED | OUTPATIENT
Start: 2019-09-24 | End: 2019-09-26

## 2019-09-24 RX ORDER — BECLOMETHASONE DIPROPIONATE 40 UG/1
2 AEROSOL, METERED RESPIRATORY (INHALATION)
Qty: 0 | Refills: 0 | DISCHARGE

## 2019-09-24 RX ORDER — DILTIAZEM HCL 120 MG
180 CAPSULE, EXT RELEASE 24 HR ORAL DAILY
Refills: 0 | Status: DISCONTINUED | OUTPATIENT
Start: 2019-09-24 | End: 2019-09-26

## 2019-09-24 RX ORDER — SENNA PLUS 8.6 MG/1
2 TABLET ORAL AT BEDTIME
Refills: 0 | Status: DISCONTINUED | OUTPATIENT
Start: 2019-09-24 | End: 2019-09-26

## 2019-09-24 RX ORDER — ACETAMINOPHEN 500 MG
650 TABLET ORAL EVERY 6 HOURS
Refills: 0 | Status: DISCONTINUED | OUTPATIENT
Start: 2019-09-24 | End: 2019-09-26

## 2019-09-24 RX ORDER — MAGNESIUM OXIDE 400 MG ORAL TABLET 241.3 MG
1 TABLET ORAL
Qty: 0 | Refills: 0 | DISCHARGE

## 2019-09-24 RX ORDER — RIVAROXABAN 15 MG-20MG
20 KIT ORAL
Refills: 0 | Status: DISCONTINUED | OUTPATIENT
Start: 2019-09-24 | End: 2019-09-26

## 2019-09-24 RX ORDER — DEXTROSE 50 % IN WATER 50 %
25 SYRINGE (ML) INTRAVENOUS ONCE
Refills: 0 | Status: DISCONTINUED | OUTPATIENT
Start: 2019-09-24 | End: 2019-09-26

## 2019-09-24 RX ORDER — FUROSEMIDE 40 MG
60 TABLET ORAL EVERY 8 HOURS
Refills: 0 | Status: COMPLETED | OUTPATIENT
Start: 2019-09-24 | End: 2019-09-25

## 2019-09-24 RX ORDER — ATORVASTATIN CALCIUM 80 MG/1
80 TABLET, FILM COATED ORAL AT BEDTIME
Refills: 0 | Status: DISCONTINUED | OUTPATIENT
Start: 2019-09-24 | End: 2019-09-26

## 2019-09-24 RX ORDER — INSULIN LISPRO 100/ML
5 VIAL (ML) SUBCUTANEOUS
Refills: 0 | Status: DISCONTINUED | OUTPATIENT
Start: 2019-09-24 | End: 2019-09-26

## 2019-09-24 RX ORDER — TAMSULOSIN HYDROCHLORIDE 0.4 MG/1
0.4 CAPSULE ORAL AT BEDTIME
Refills: 0 | Status: DISCONTINUED | OUTPATIENT
Start: 2019-09-24 | End: 2019-09-26

## 2019-09-24 RX ORDER — INSULIN GLARGINE 100 [IU]/ML
28 INJECTION, SOLUTION SUBCUTANEOUS ONCE
Refills: 0 | Status: COMPLETED | OUTPATIENT
Start: 2019-09-24 | End: 2019-09-24

## 2019-09-24 RX ORDER — GLUCAGON INJECTION, SOLUTION 0.5 MG/.1ML
1 INJECTION, SOLUTION SUBCUTANEOUS ONCE
Refills: 0 | Status: DISCONTINUED | OUTPATIENT
Start: 2019-09-24 | End: 2019-09-26

## 2019-09-24 RX ORDER — FUROSEMIDE 40 MG
40 TABLET ORAL ONCE
Refills: 0 | Status: COMPLETED | OUTPATIENT
Start: 2019-09-24 | End: 2019-09-24

## 2019-09-24 RX ORDER — SODIUM CHLORIDE 9 MG/ML
1000 INJECTION, SOLUTION INTRAVENOUS
Refills: 0 | Status: DISCONTINUED | OUTPATIENT
Start: 2019-09-24 | End: 2019-09-26

## 2019-09-24 RX ORDER — SITAGLIPTIN 50 MG/1
1 TABLET, FILM COATED ORAL
Qty: 0 | Refills: 0 | DISCHARGE

## 2019-09-24 RX ORDER — ZINC SULFATE TAB 220 MG (50 MG ZINC EQUIVALENT) 220 (50 ZN) MG
1 TAB ORAL
Qty: 0 | Refills: 0 | DISCHARGE

## 2019-09-24 RX ORDER — INSULIN LISPRO 100/ML
VIAL (ML) SUBCUTANEOUS
Refills: 0 | Status: DISCONTINUED | OUTPATIENT
Start: 2019-09-24 | End: 2019-09-26

## 2019-09-24 RX ORDER — DEXTROSE 50 % IN WATER 50 %
15 SYRINGE (ML) INTRAVENOUS ONCE
Refills: 0 | Status: DISCONTINUED | OUTPATIENT
Start: 2019-09-24 | End: 2019-09-26

## 2019-09-24 RX ORDER — DEXTROSE 50 % IN WATER 50 %
12.5 SYRINGE (ML) INTRAVENOUS ONCE
Refills: 0 | Status: DISCONTINUED | OUTPATIENT
Start: 2019-09-24 | End: 2019-09-26

## 2019-09-24 RX ADMIN — INSULIN GLARGINE 28 UNIT(S): 100 INJECTION, SOLUTION SUBCUTANEOUS at 22:49

## 2019-09-24 RX ADMIN — SENNA PLUS 2 TABLET(S): 8.6 TABLET ORAL at 22:49

## 2019-09-24 RX ADMIN — INSULIN GLARGINE 28 UNIT(S): 100 INJECTION, SOLUTION SUBCUTANEOUS at 10:08

## 2019-09-24 RX ADMIN — RIVAROXABAN 20 MILLIGRAM(S): KIT at 10:09

## 2019-09-24 RX ADMIN — Medication 81 MILLIGRAM(S): at 13:04

## 2019-09-24 RX ADMIN — Medication 5 UNIT(S): at 07:59

## 2019-09-24 RX ADMIN — Medication 5 UNIT(S): at 13:46

## 2019-09-24 RX ADMIN — Medication 60 MILLIGRAM(S): at 13:04

## 2019-09-24 RX ADMIN — Medication 2: at 13:45

## 2019-09-24 RX ADMIN — TAMSULOSIN HYDROCHLORIDE 0.4 MILLIGRAM(S): 0.4 CAPSULE ORAL at 22:49

## 2019-09-24 RX ADMIN — ATORVASTATIN CALCIUM 80 MILLIGRAM(S): 80 TABLET, FILM COATED ORAL at 22:49

## 2019-09-24 RX ADMIN — Medication 180 MILLIGRAM(S): at 13:04

## 2019-09-24 RX ADMIN — Medication 100 MILLIGRAM(S): at 17:34

## 2019-09-24 RX ADMIN — Medication 40 MILLIGRAM(S): at 01:58

## 2019-09-24 RX ADMIN — Medication 60 MILLIGRAM(S): at 22:49

## 2019-09-24 NOTE — H&P ADULT - NSHPPHYSICALEXAM_GEN_ALL_CORE
T(C): 37.1 (09-24-19 @ 06:40), Max: 37.1 (09-24-19 @ 06:40)  HR: 70 (09-24-19 @ 06:40) (70 - 92)  BP: 138/76 (09-24-19 @ 06:40) (138/64 - 144/74)  RR: 18 (09-24-19 @ 06:40) (18 - 18)  SpO2: 95% (09-24-19 @ 06:40) (95% - 99%)  Wt(kg): --    PHYSICAL EXAM:  GENERAL: NAD, well-groomed, well-developed  HEAD:  Atraumatic, Normocephalic  EYES: EOMI, PERRLA, conjunctiva and sclera clear  ENMT: No oropharyngeal exudates, erythema or lesions, Moist mucous membranes  NECK: Supple, no cervical lymphadenopathy, elevated JVD to angle of jaw  NERVOUS SYSTEM:  Alert & Oriented X3, CN II-XII intact, 5/5 BUE and BLE motor strength, full sensation to light touch   CHEST/LUNG: Crackles bilaterally;  no  rhonchi, no wheezing  HEART: Irregular rate and rhythm; No murmurs, rubs, or gallops  LLE edema to thigh, RLE edema to knee  ABDOMEN: Soft, Nontender, Nondistended  EXTREMITIES:  2+ irregular radial pulses, No clubbing, cyanosis  SKIN: No rashes or lesions

## 2019-09-24 NOTE — H&P ADULT - PROBLEM SELECTOR PLAN 3
Will hold oral hypoglycemic agents.  Start premeal and qhs fingerstick blood sugar checks.   Start lantus 28 u qhs and lispro 5 u premeal with sliding scale.

## 2019-09-24 NOTE — PROVIDER CONTACT NOTE (OTHER) - ASSESSMENT
Pt resting comfortably in bed, A&Ox4. /69, HR 86, T 98.6, RR 18, o2 97%. Pt shows no dizziness, weakness, diaphoresis. Pt resting comfortably in bed, A&Ox4. /69, HR 86, T 98.6, RR 18, o2 97%. Pt shows no dizziness, weakness, diaphoresis. Pt just received dinner tray; awaiting FS prior to eating.

## 2019-09-24 NOTE — ED ADULT NURSE NOTE - OBJECTIVE STATEMENT
64y male presents to ED complaining of SOB. PT is a/ox4 states that over the last few week shes developed increased SOB, worse with ambulation. Pt currently on lasix. Pt breathing spontaneous and unlabored with lungs diminished to auscultation bilaterally. Pt skin is warm, dry and intact with no edema present. Pt abdomen soft, nontender, nondistended with bowel sounds present in all 4 quadrants. Pt PERRL, with equal strength bilaterally in upper and lower extremities with full sensation. Pt denies chest pain and SOB at rest, denies n/v/d, denies fever/chills and cough, denies dysuria, denies numbness/tingling and weakness.

## 2019-09-24 NOTE — H&P ADULT - ASSESSMENT
This patient is a 64yoM with PMH of CAD s/p 3 stents, htn, hLd, T2DM c/b peripheral neuropathy, afib on AC who presents to the ED with complaint of SOB. He endorses having 2 days of dyspnea with exertion and orthopnea. He denies PND. His dyspnea has limited his ambulation. He has had worsening lower extremity edema. Left lower extremity has edema to thigh, right lower extremity extended to the right knee. Patient had 12 lbs weight gain from 148 to 160 lbs. He denies CP, palpitations, dizziness, lightheadedness, N/V.  He has had persistent dry cough.      The patient was last hospitalized from 8/29-9/8/2019 for LLE cellulitis. He completed a course of antibiotics and LLE US was negative for DVT. Pt was noted to be in afib with RVR and DCCV was canceled due to finding of early thormbus/smoke on LA on YVES. Rate control was continued with lopressor, cardizem. Pt was continued on xarelto. Pt was transfused 1 u PRBC for anemia, found to have bleeding to psoas muscle with hematoma. He was noted to have BARRY, thus lasix and ACEI were held.     In the ED, T 97.9F, HR 67, /64, RR 18, SpO2 99%RA  Pt was given lasix 40mg IVP x1.      EKG atrial fibrillation , TWI in V4-V6    CBC found WBC elevated at 14. Pt is anemic with Hgb of 10.6, however Hgb appears higher than level from 9/11/2019.  Platelet count elevated at 409.   INR elevated at 2.79.  HS Troponin T is 27, repeat 26.   CMP found elevated SCr of 1.47, which appears improved compared to 9/11/2019, but elevated compared to 8/2019.  Tbili elevated at 1.7, with elevated alk phos of 168.  ProBNP elevated at 3918. This patient is a 64yoM with PMH of CAD s/p 3 stents, htn, hLd, T2DM c/b peripheral neuropathy, afib on AC who presents to the ED with complaint of SOB, weight gain, lower extremity edema concerning for acute diastolic heart failure exacerbation.

## 2019-09-24 NOTE — ED ADULT NURSE NOTE - NSIMPLEMENTINTERV_GEN_ALL_ED
Implemented All Universal Safety Interventions:  [place] to call system. Call bell, personal items and telephone within reach. Instruct patient to call for assistance. Room bathroom lighting operational. Non-slip footwear when patient is off stretcher. Physically safe environment: no spills, clutter or unnecessary equipment. Stretcher in lowest position, wheels locked, appropriate side rails in place.

## 2019-09-24 NOTE — PROVIDER CONTACT NOTE (OTHER) - RECOMMENDATIONS
Follow hypoglycemia protocol. Have pt eat dinner and re-check blood glucose q15 min until over 100 mg/dL.

## 2019-09-24 NOTE — H&P ADULT - NSHPSOCIALHISTORY_GEN_ALL_CORE
The patient lives with his wife and mother-in-law.  He quit smoking at age 20.  He denies alcohol use.  He works as an  for the city of [place].

## 2019-09-24 NOTE — H&P ADULT - PROBLEM SELECTOR PLAN 4
Start home dose of diltiazem and metoprolol for rate control.  Will start xarelto-- based on renal dosing, recommended dose is 20mg PO qd, will start at this dose for now.   Patient was planned for repeat cardiac evaluation on 10/17/2019 for potential cardioversion since last hospitalization found atrial thrombus which precluded potential DCCV. Creatinine is elevated from baseline, however has been elevated over the last month.  Will monitor renal function while diuresing.   ACEI was held during previous admission. Continue to hold for now. Will plan to resume when renal function improves.

## 2019-09-24 NOTE — H&P ADULT - PROBLEM SELECTOR PLAN 1
Patient's dyspnea, lower extremity edema and pulmonary crackles and weight gain are suggestive of a heart failure exacerbation. CXR shows new pulmonary effusions.   Monitor on telemetry.  Continue diuresis with lasix 60mg IVP q8h. Trend I&Os.  Follow up electrolytes and replete PRN.   Consult Dr. Moon in AM [phone]

## 2019-09-24 NOTE — H&P ADULT - NSHPREVIEWOFSYSTEMS_GEN_ALL_CORE
REVIEW OF SYSTEMS  CONSTITUTIONAL: No fever, no chills, + fatigue  EYES: No eye pain, no vision changes  ENMT:  No difficulty hearing, no throat pain  RESPIRATORY: +cough, no sputum production; + shortness of breath  CARDIOVASCULAR: No chest pain, no palpitations, + CLEARY, + LLE more than RLE swelling  GASTROINTESTINAL: No abdominal pain, no nausea, no vomiting, no diarrhea, no constipation  GENITOURINARY: No dysuria, no hematuria  NEUROLOGICAL: No headaches, no loss of strength, no numbness  SKIN: No itching, no rashes, no lesions   MUSCULOSKELETAL: No joint pain, no joint swelling; No muscle pain  HEME/LYMPH: No easy bruising, bleeding

## 2019-09-24 NOTE — H&P ADULT - HISTORY OF PRESENT ILLNESS
This patient is a 64yoM with PMH of CAD s/p 3 stents, htn, hLd, T2DM c/b peripheral neuropathy, afib on AC who presents to the ED  with .     The patient was last hospitalized from 8/29-9/8/2019 for LLE cellulitis. He completed a course of antibiotics and LLE US was negative for DVT. Pt was noted to be in afib with RVR and DCCV was canceled due to finding of early thormbus/smoke on LA on YVES. Rate control was continued with lopressor, cardizem. Pt was continued on xarelto. Pt was transfused 1 u PRBC for anemia, found to have bleeding to psoas muscle with hematoma. He was noted to have BARRY, thus lasix and ACEI were held.     In the ED, T 97.9F, HR 67, /64, RR 18, SpO2 99%RA  Pt was given lasix 40mg IVP x1.        CBC found WBC elevated at 14. Pt is anemic with Hgb of 10.6, however Hgb appears higher than level from 9/11/2019.  Platelet count elevated at 409.   INR elevated at 2.79.  HS Troponin T is 27, repeat 26.   CMP found elevated SCr of 1.47, which appears improved compared to 9/11/2019, but elevated compared to 8/2019.  Tbili elevated at 1.7, with elevated alk phos of 168.  ProBNP elevated at 3918. This patient is a 64yoM with PMH of CAD s/p 3 stents, htn, hLd, T2DM c/b peripheral neuropathy, afib on AC who presents to the ED with complaint of SOB. He endorses having 2 days of dyspnea with exertion and orthopnea. He denies PND. His dyspnea has limited his ambulation. He has had worsening lower extremity edema. Left lower extremity has edema to thigh, right lower extremity extended to the right knee. Patient had 12 lbs weight gain from 148 to 160 lbs. He denies CP, palpitations, dizziness, lightheadedness, N/V.  He has had persistent dry cough.      The patient was last hospitalized from 8/29-9/8/2019 for LLE cellulitis. He completed a course of antibiotics and LLE US was negative for DVT. Pt was noted to be in afib with RVR and DCCV was canceled due to finding of early thormbus/smoke on LA on YVES. Rate control was continued with lopressor, cardizem. Pt was continued on xarelto. Pt was transfused 1 u PRBC for anemia, found to have bleeding to psoas muscle with hematoma. He was noted to have BARRY, thus lasix and ACEI were held.     In the ED, T 97.9F, HR 67, /64, RR 18, SpO2 99%RA  Pt was given lasix 40mg IVP x1.

## 2019-09-24 NOTE — ED ADULT NURSE NOTE - NSSEPSISSUSPECTED_ED_A_ED
[facility]  [address] [address]  Phone:  [phone]                                    Leonel Norton   MRN: [id number]    Department:  [facility]   Date of Visit:  2/12/2019           After Visit Summary Signature Page    I have received my discharge instructions, and my questions have been answered. I have discussed any challenges I see with this plan with the nurse or doctor.    ..........................................................................................................................................  Patient/Patient Representative Signature      ..........................................................................................................................................  Patient Representative Print Name and Relationship to Patient    ..................................................               ................................................  Date                                   Time    ..........................................................................................................................................  Reviewed by Signature/Title    ...................................................              ..............................................  Date                                               Time          22EPIC Rev 08/18       
No

## 2019-09-24 NOTE — H&P ADULT - PROBLEM SELECTOR PLAN 7
VTE ppx: continue xarelto  Activity: OOB with assistance  Diet: DASH, consistent carb Start home dose of diltiazem and metoprolol succinate.   Patient also listed as being on amlodipine 5mg PO qd-- unclear why he was on 2 calcium channel blockers. Will hold for now.

## 2019-09-24 NOTE — H&P ADULT - PROBLEM SELECTOR PLAN 6
Start home dose of diltiazem and metoprolol succinate.   Patient also listed as being on amlodipine 5mg PO qd-- unclear why he was on 2 calcium channel blockers. Will hold for now. Start home dose of atorvastatin 80mg PO qd.

## 2019-09-24 NOTE — H&P ADULT - PROBLEM SELECTOR PLAN 5
Start home dose of atorvastatin 80mg PO qd. Start home dose of diltiazem and metoprolol for rate control.  Will start xarelto-- based on renal dosing, recommended dose is 20mg PO qd, will start at this dose for now.   Patient was planned for repeat cardiac evaluation on 10/17/2019 for potential cardioversion since last hospitalization found atrial thrombus which precluded potential DCCV.

## 2019-09-24 NOTE — CONSULT NOTE ADULT - SUBJECTIVE AND OBJECTIVE BOX
CHIEF COMPLAINT: Dyspnea    HPI:  This patient is a 64yoM with PMH of CAD s/p 3 stents, htn, hLd, T2DM c/b peripheral neuropathy, afib on AC who presents to the ED with complaint of SOB. He endorses having 2 days of dyspnea with exertion and orthopnea. He denies PND. His dyspnea has limited his ambulation. He has had worsening lower extremity edema. Left lower extremity has edema to thigh, right lower extremity extended to the right knee. Patient had 12 lbs weight gain from 148 to 160 lbs. He denies CP, palpitations, dizziness, lightheadedness, N/V.  He has had persistent dry cough.      The patient was last hospitalized from -2019 for LLE cellulitis. He completed a course of antibiotics and LLE US was negative for DVT. Pt was noted to be in afib with RVR and DCCV was canceled due to finding of early thormbus/smoke on LA on YVES. Rate control was continued with lopressor, cardizem. Pt was continued on xarelto. Pt was transfused 1 u PRBC for anemia, found to have bleeding to psoas muscle with hematoma. He was noted to have BARRY, thus lasix and ACEI were held.     In the ED, T 97.9F, HR 67, /64, RR 18, SpO2 99%RA  Pt was given lasix 40mg IVP x1. (24 Sep 2019 03:29)      PAST MEDICAL & SURGICAL HISTORY:  Afib  Essential hypertension, hypertension with unspecified goal  Hyperlipidemia, unspecified hyperlipidemia type  Type 2 diabetes mellitus  CAD in native artery  Former smoker  s/p Carotid Endarterectomy: left      Allergies    No Known Allergies    Intolerances        SOCIAL HISTORY    Smoking Hx:  None	  ETOH Hx: None  Marital Status:   Occupational Hx:     FAMILY HISTORY:  FH: atrial fibrillation: sister  Family history of heart disease: father  age 71      MEDICATIONS:  acetaminophen   Tablet .. 650 milliGRAM(s) Oral every 6 hours PRN  aspirin enteric coated 81 milliGRAM(s) Oral daily  atorvastatin 80 milliGRAM(s) Oral at bedtime  dextrose 40% Gel 15 Gram(s) Oral once PRN  dextrose 5%. 1000 milliLiter(s) IV Continuous <Continuous>  dextrose 50% Injectable 12.5 Gram(s) IV Push once  dextrose 50% Injectable 25 Gram(s) IV Push once  dextrose 50% Injectable 25 Gram(s) IV Push once  diltiazem    milliGRAM(s) Oral daily  docusate sodium 100 milliGRAM(s) Oral two times a day  furosemide   Injectable 60 milliGRAM(s) IV Push every 8 hours  glucagon  Injectable 1 milliGRAM(s) IntraMuscular once PRN  insulin glargine Injectable (LANTUS) 28 Unit(s) SubCutaneous at bedtime  insulin glargine Injectable (LANTUS) 28 Unit(s) SubCutaneous once  insulin lispro (HumaLOG) corrective regimen sliding scale   SubCutaneous three times a day before meals  insulin lispro (HumaLOG) corrective regimen sliding scale   SubCutaneous at bedtime  insulin lispro Injectable (HumaLOG) 5 Unit(s) SubCutaneous three times a day before meals  rivaroxaban 20 milliGRAM(s) Oral with dinner  senna 2 Tablet(s) Oral at bedtime  tamsulosin 0.4 milliGRAM(s) Oral at bedtime      REVIEW OF SYSTEMS:    CONSTITUTIONAL: No weakness, fevers or chills  EYES/ENT: No visual changes;  No vertigo or throat pain   NECK: No pain or stiffness  RESPIRATORY: No cough, wheezing, hemoptysis; No shortness of breath  CARDIOVASCULAR: No chest pain or palpitations  GASTROINTESTINAL: No abdominal or epigastric pain. No nausea, vomiting, or hematemesis; No diarrhea or constipation. No melena or hematochezia.  GENITOURINARY: No dysuria, frequency or hematuria  NEUROLOGICAL: No numbness or weakness  SKIN: No itching, burning, rashes, or lesions   All other review of systems is negative unless indicated above    Vital Signs Last 24 Hrs  T(C): 37.1 (24 Sep 2019 06:40), Max: 37.1 (24 Sep 2019 06:40)  T(F): 98.8 (24 Sep 2019 06:40), Max: 98.8 (24 Sep 2019 06:40)  HR: 70 (24 Sep 2019 06:40) (70 - 92)  BP: 138/76 (24 Sep 2019 06:40) (138/64 - 144/74)  BP(mean): --  RR: 18 (24 Sep 2019 06:40) (18 - 18)  SpO2: 95% (24 Sep 2019 06:40) (95% - 99%)    I&O's Summary      PHYSICAL EXAM:    Constitutional: NAD, awake and alert, well-developed  HEENT: PERR, EOMI  Neck: soft and supple, No LAD, No JVD  Respiratory: Breath sounds are clear bilaterally, No wheezing, rales or rhonchi  Cardiovascular: Irregular rate and rhythm, normal S1 and S2,  no murmurs, gallops or rubs  Gastrointestinal: Bowel Sounds present, soft, nontender.   Extremities: 1+ edema in LE's. No clubbing or cyanosis.  Vascular: 2+ peripheral pulses  Neurological: A/O x 3, no focal deficits  Musculoskeletal: no calf tenderness.  Skin: No rashes.      LABS: All Labs Reviewed:                        10.6   14.0  )-----------( 409      ( 23 Sep 2019 19:14 )             34.1     23 Sep 2019 19:14    136    |  96     |  20     ----------------------------<  128    4.2     |  26     |  1.47     Ca    9.7        23 Sep 2019 19:14  Phos  3.7       23 Sep 2019 19:14  Mg     2.3       23 Sep 2019 19:14    TPro  7.3    /  Alb  4.2    /  TBili  1.7    /  DBili  x      /  AST  46     /  ALT  40     /  AlkPhos  168    23 Sep 2019 19:14    PT/INR - ( 23 Sep 2019 19:14 )   PT: 32.8 sec;   INR: 2.79 ratio         PTT - ( 23 Sep 2019 19:14 )  PTT:46.1 sec      proBNP: Serum Pro-Brain Natriuretic Peptide: 3918 pg/mL ( @ 19:14)  CXR:"   	******PRELIMINARY REPORT******          	INTERPRETATION:  Bilateral pleural effusions, new since 2019.        RADIOLOGY/EKG: Atrial fibrillation with moderate ventricular response, poor R wave progression V1-V3, ST T wave changes c/w lateral wall ischemia

## 2019-09-24 NOTE — CHART NOTE - NSCHARTNOTEFT_GEN_A_CORE
Medicine PA Note    Name: KRISTY COYLE  MRN: [id number]  Age: 64y Gender: Male    CC: Hypoglycemia    Called by RN to evaluate patient for blood glucose of 66. Patient was seen and examined by me at the bedside. Patient was lying in bed. He was in no acute distress and non-toxic in appearance. Patient denies the presence of chest pain, palpitations, cough, dyspnea, diaphoresis, dizziness/lightheadedness, N/V, HA, and/or visual changes. He reports a cough but no sick contacts, recent travel, or fever. Patient states that he ate well to day but was not able to snack as usual. He endorses that he just ate a big plate of linguini.     Vital Signs Last 24 Hrs  T(C): 37 (24 Sep 2019 18:49), Max: 37.1 (24 Sep 2019 06:40)  T(F): 98.6 (24 Sep 2019 18:49), Max: 98.8 (24 Sep 2019 06:40)  HR: 86 (24 Sep 2019 18:49) (70 - 92)  BP: 123/69 (24 Sep 2019 18:49) (116/59 - 147/74)  RR: 18 (24 Sep 2019 18:49) (16 - 18)  SpO2: 97% (24 Sep 2019 18:49) (95% - 98%)    Radiology: < from: Xray Chest 2 Views PA/Lat (09.23.19 @ 21:50) >    IMPRESSION:   Bilateral trace pleural effusions, new since 8/29/2019.    PHYSICAL EXAM:  GENERAL: A pleasant well-developed male in no acute distress. Non-toxic appearing. Speaking in full sentences.  CHEST/LUNG: (+) bibasilar crackles. (+) dry cough. No wheezing, rhonchi, or rales. Chest expansion symmetrical. No accessory muscle use.  HEART: (+) irregular rhythm. Regular rate. No murmurs, rubs, or gallops. No palpable thrill.  ABDOMEN: Soft, nontender, and nondistended. Bowel sounds present in all 4 quadrants.  EXTREMITIES: (+2) BL LE edema. 2+ peripheral pulses. No clubbing or cyanosis.  PSYCH: AAOx4. Appropriate affect.  NEUROLOGY: No focal deficits.    ASSESSMENT/PLAN:   This patient is a 64yoM with PMH of CAD (s/p 3 stents), htn, HLD, T2DM (c/b peripheral neuropathy), AFib on AC who presents to the ED with complaint of SOB. He endorses having 2 days of dyspnea with exertion and orthopnea. He denies PND. His dyspnea has limited his ambulation. Patient now presents with an episode of hypoglycemia.    #Hypoglycemia  - Hypoglycemia protocol followed and BG increased to 120    #Cough  - RVP pending collection    George Krueger PA-C  Department of Medicine   [facility]

## 2019-09-24 NOTE — ED ADULT NURSE REASSESSMENT NOTE - NS ED NURSE REASSESS COMMENT FT1
received patient in 7 from RUSSELL ESPARZA Resting comfortably and awaiting bed assignment in hospital.

## 2019-09-24 NOTE — H&P ADULT - NSHPLABSRESULTS_GEN_ALL_CORE
Labs, imaging  personally reviewed by me.     EKG atrial fibrillation , TWI in V4-V6    CBC found WBC elevated at 14. Pt is anemic with Hgb of 10.6, however Hgb appears higher than level from 9/11/2019.  Platelet count elevated at 409.   INR elevated at 2.79.  HS Troponin T is 27, repeat 26.   CMP found elevated SCr of 1.47, which appears improved compared to 9/11/2019, but elevated compared to 8/2019.  Tbili elevated at 1.7, with elevated alk phos of 168.  ProBNP elevated at 3918.       LABS:                        10.6   14.0  )-----------( 409      ( 23 Sep 2019 19:14 )             34.1     Hgb Trend: 10.6<--  09-23    136  |  96  |  20  ----------------------------<  128<H>  4.2   |  26  |  1.47<H>    Ca    9.7      23 Sep 2019 19:14  Phos  3.7     09-23  Mg     2.3     09-23    TPro  7.3  /  Alb  4.2  /  TBili  1.7<H>  /  DBili  x   /  AST  46<H>  /  ALT  40  /  AlkPhos  168<H>  09-23    Creatinine Trend: 1.47<--, 1.67<--, 1.75<--, 1.69<--, 1.72<--, 1.69<--  PT/INR - ( 23 Sep 2019 19:14 )   PT: 32.8 sec;   INR: 2.79 ratio         PTT - ( 23 Sep 2019 19:14 )  PTT:46.1 sec      CXR:"   ******PRELIMINARY REPORT******          INTERPRETATION:  Bilateral pleural effusions, new since 8/29/2019.    < end of copied text >

## 2019-09-24 NOTE — CONSULT NOTE ADULT - ASSESSMENT
64yoM with PMH of CAD s/p 3 stents, htn, hLd, T2DM c/b peripheral neuropathy, afib on AC who presents to the ED with complaint of SOB. He endorses having 2 days of dyspnea with exertion and orthopnea. He denies PND. His dyspnea has limited his ambulation.  Heart failure has improved with the diursis with lasix 60 mg q8h.  Continue lasix for the next 24 hours and reevaluate dose.  BNP is elevated.  Recheck in 2 days  No evidence for acute ACS  Atrial fibrillation well controlled.  On xarelto.  Patient was planned for repeat cardiac evaluation on 10/17/2019 for potential cardioversion since last hospitalization found atrial thrombus which precluded DCCV.  Blood pressure well controlled.  Monitor renal function, elevated creatinine  Will follow with you.

## 2019-09-24 NOTE — CONSULT NOTE ADULT - SUBJECTIVE AND OBJECTIVE BOX
HPI:  Mr. Velázquez is a 64year-old man with history of hypertension, type 2 diabetes mellitus, coronary artery disease, atrial fibrillation, mild dementia, and stage 3 chronic kidney disease. He is s/p admission at [facility] -19 for RLE cellulitis. He was noted during the admission to be in afib with, with rapid ventricular response; DCCV was canceled due to finding of early thrombus/smoke on LA on YVES. The admission was complicated by decompensated CHF and hyponatremia.     Mr. Velázquez returns to the [facility] ER today with progressively worsening shortness of breath for 2 days, accompanied by worsening b/l LE edema.    PAST MEDICAL & SURGICAL HISTORY:  mild dementia  Afib  Essential hypertension, hypertension with unspecified goal  Hyperlipidemia, unspecified hyperlipidemia type  Type 2 diabetes mellitus  CAD in native artery  CKD3  s/p Carotid Endarterectomy: left    Allergies  No Known Allergies    SOCIAL HISTORY:  Former smoker    FAMILY HISTORY:  FH: atrial fibrillation: sister  Family history of heart disease: father  age 71    REVIEW OF SYSTEMS:  CONSTITUTIONAL: No weakness, fevers or chills  EYES/ENT: No visual changes;  No vertigo or throat pain   NECK: No pain or stiffness  RESPIRATORY: No cough, wheezing, hemoptysis; (+) shortness of breath  CARDIOVASCULAR: No chest pain or palpitations; (+)LE swelling  GASTROINTESTINAL: No abdominal or epigastric pain. No nausea, vomiting, or hematemesis; No diarrhea or constipation. No melena or hematochezia.  GENITOURINARY: No dysuria, frequency or hematuria  NEUROLOGICAL: No numbness or weakness  SKIN: No itching, burning, rashes, or lesions   All other review of systems is negative unless indicated above.    VITAL:  T(C): , Max: 37.1 (19 @ 06:40)  T(F): , Max: 98.8 (19 @ 06:40)  HR: 87 (19 @ 13:03)  BP: 138/68 (19 @ 13:03)  RR: 18 (19 @ 10:44)  SpO2: 98% (19 @ 10:44)      PHYSICAL EXAM:  Constitutional: NAD; Alert  HEENT:  NCAT; DMM  Neck: No JVD; supple  Respiratory: CTA-b/l  Cardiac: RRR s1s2  Gastrointestinal: BS+, soft, NT/ND  Urologic: No moreno; (+)scrotal swelling  Extremities: L>RLE edema  Back: No CVAT b/l    LABS:                        10.6   14.0  )-----------( 409      ( 23 Sep 2019 19:14 )             34.1     Na(136)/K(4.2)/Cl(96)/HCO3(26)/BUN(20)/Cr(1.47)Glu(128)/Ca(9.7)/Mg(2.3)/PO4(3.7)    09 @ 19:14    (19) - BUN/creatinine: 36/1.67, Na 134  (19) - BUN/creatinine: 34/1.69, Na 126      IMAGING:  < from: Xray Chest 2 Views PA/Lat (19 @ 21:50) >  Bilateral trace pleural effusions, new since 2019.        ASSESSMENT: 64M with PMHx of HTN, HLD, T2DM, DM neuropathy, afib, and CAD, 19 a/w RLE cellulitis    (1)CKD -stage 2-3; presumed early diabetic nephropathy. He had BARRY from prerenal azotemia during the last admission, but does not appear to have any BARRY at present.    (2)Hyponatremia - at goal at present    (3)CV - ?CHF flare? Being diuresed with IV Lasix    RECOMMENDATIONS:  (1)No objection to Lasix as ordered  (2)1.2L free water restriction  (3)Dose new meds for GFR 45-55ml/min  (4)BMP daily      Thank you for involving [facility] in this patient's care.    With warm regards,    Markell Walton MD   [facility]  [phone] HPI:  Mr. Velázquez is a 64year-old man with history of hypertension, type 2 diabetes mellitus, coronary artery disease, atrial fibrillation, mild dementia, and stage 3 chronic kidney disease. He is s/p admission at [facility] -19 for RLE cellulitis. He was noted during the admission to be in afib with, with rapid ventricular response; DCCV was canceled due to finding of early thrombus/smoke on LA on YVES. The admission was complicated by decompensated CHF and hyponatremia.     Mr. Velázquez returns to the [facility] ER today with progressively worsening shortness of breath for 2 days, accompanied by worsening b/l LE edema. He tells me that at home, he has been voiding hardly at all in response to the Lasix 40bid. His urinary response to the oral Lasix as ordered is nowhere near the urinary response from the IV Lasix at [facility].    PAST MEDICAL & SURGICAL HISTORY:  mild dementia  Afib  Essential hypertension, hypertension with unspecified goal  Hyperlipidemia, unspecified hyperlipidemia type  Type 2 diabetes mellitus  CAD in native artery  CKD3  s/p Carotid Endarterectomy: left    Allergies  No Known Allergies    SOCIAL HISTORY:  Former smoker    FAMILY HISTORY:  FH: atrial fibrillation: sister  Family history of heart disease: father  age 71    REVIEW OF SYSTEMS:  CONSTITUTIONAL: No weakness, fevers or chills  EYES/ENT: No visual changes;  No vertigo or throat pain   NECK: No pain or stiffness  RESPIRATORY: No cough, wheezing, hemoptysis; (+) shortness of breath  CARDIOVASCULAR: No chest pain or palpitations; (+)LE swelling  GASTROINTESTINAL: No abdominal or epigastric pain. No nausea, vomiting, or hematemesis; No diarrhea or constipation. No melena or hematochezia.  GENITOURINARY: No dysuria, frequency or hematuria  NEUROLOGICAL: No numbness or weakness  SKIN: No itching, burning, rashes, or lesions   All other review of systems is negative unless indicated above.    VITAL:  T(C): , Max: 37.1 (19 @ 06:40)  T(F): , Max: 98.8 (19 @ 06:40)  HR: 87 (19 @ 13:03)  BP: 138/68 (19 @ 13:03)  RR: 18 (19 @ 10:44)  SpO2: 98% (19 @ 10:44)      PHYSICAL EXAM:  Constitutional: NAD; Alert  HEENT:  NCAT; DMM  Neck: No JVD; supple  Respiratory: CTA-L; decreased at right base  Cardiac: RRR s1s2  Gastrointestinal: BS+, soft, NT/ND  Urologic: No moreno; (+)scrotal swelling  Extremities: 2+ b/l LE edema  Back: No CVAT b/l    LABS:                        10.6   14.0  )-----------( 409      ( 23 Sep 2019 19:14 )             34.1     Na(136)/K(4.2)/Cl(96)/HCO3(26)/BUN(20)/Cr(1.47)Glu(128)/Ca(9.7)/Mg(2.3)/PO4(3.7)     @ 19:14    (19) - BUN/creatinine: 36/1.67, Na 134  (19) - BUN/creatinine: 34/1.69, Na 126      IMAGING:  < from: Xray Chest 2 Views PA/Lat (19 @ 21:50) >  Bilateral trace pleural effusions, new since 2019.        ASSESSMENT: 64M with PMHx of HTN, HLD, T2DM, DM neuropathy, afib, and CAD, 19 a/w RLE cellulitis    (1)CKD -stage 2-3; presumed early diabetic nephropathy. He had BARRY from prerenal azotemia during the last admission, but does not appear to have any BARRY at present.    (2)Hyponatremia - at goal at present    (3)CV - mild CHF flare. Being treated with IV Lasix. Needs a higher dose of outpatient diuretics, as it appears he is not appropriately responding to Lasix 40mg PO dose.    RECOMMENDATIONS:  (1)Lasix IV as ordered for now  (2)Increase Lasix to 80mg po bid at home  (3)Add Metolazone 5mg po daily at home on discharge      Thank you for involving [facility] in this patient's care.    With warm regards,    Markell Walton MD   [facility]  [phone]

## 2019-09-24 NOTE — H&P ADULT - PROBLEM SELECTOR PLAN 2
Start home dose of aspirin, statin, BB.  Patient last had stent placed in 2016, thus is no longer taking brilinta. He denies any chest pain at this time.

## 2019-09-25 LAB
ALBUMIN SERPL ELPH-MCNC: 3.7 G/DL — SIGNIFICANT CHANGE UP (ref 3.3–5)
ALP SERPL-CCNC: 155 U/L — HIGH (ref 40–120)
ALT FLD-CCNC: 33 U/L — SIGNIFICANT CHANGE UP (ref 10–45)
ANION GAP SERPL CALC-SCNC: 11 MMOL/L — SIGNIFICANT CHANGE UP (ref 5–17)
AST SERPL-CCNC: 37 U/L — SIGNIFICANT CHANGE UP (ref 10–40)
BILIRUB SERPL-MCNC: 1.3 MG/DL — HIGH (ref 0.2–1.2)
BUN SERPL-MCNC: 19 MG/DL — SIGNIFICANT CHANGE UP (ref 7–23)
CALCIUM SERPL-MCNC: 9.4 MG/DL — SIGNIFICANT CHANGE UP (ref 8.4–10.5)
CHLORIDE SERPL-SCNC: 97 MMOL/L — SIGNIFICANT CHANGE UP (ref 96–108)
CO2 SERPL-SCNC: 31 MMOL/L — SIGNIFICANT CHANGE UP (ref 22–31)
CREAT SERPL-MCNC: 1.49 MG/DL — HIGH (ref 0.5–1.3)
GLUCOSE BLDC GLUCOMTR-MCNC: 129 MG/DL — HIGH (ref 70–99)
GLUCOSE BLDC GLUCOMTR-MCNC: 198 MG/DL — HIGH (ref 70–99)
GLUCOSE BLDC GLUCOMTR-MCNC: 245 MG/DL — HIGH (ref 70–99)
GLUCOSE BLDC GLUCOMTR-MCNC: 70 MG/DL — SIGNIFICANT CHANGE UP (ref 70–99)
GLUCOSE SERPL-MCNC: 88 MG/DL — SIGNIFICANT CHANGE UP (ref 70–99)
HCT VFR BLD CALC: 33.4 % — LOW (ref 39–50)
HGB BLD-MCNC: 10.7 G/DL — LOW (ref 13–17)
MAGNESIUM SERPL-MCNC: 2.3 MG/DL — SIGNIFICANT CHANGE UP (ref 1.6–2.6)
MCHC RBC-ENTMCNC: 29.5 PG — SIGNIFICANT CHANGE UP (ref 27–34)
MCHC RBC-ENTMCNC: 32 GM/DL — SIGNIFICANT CHANGE UP (ref 32–36)
MCV RBC AUTO: 92 FL — SIGNIFICANT CHANGE UP (ref 80–100)
PHOSPHATE SERPL-MCNC: 3.4 MG/DL — SIGNIFICANT CHANGE UP (ref 2.5–4.5)
PLATELET # BLD AUTO: 337 K/UL — SIGNIFICANT CHANGE UP (ref 150–400)
POTASSIUM SERPL-MCNC: 3.9 MMOL/L — SIGNIFICANT CHANGE UP (ref 3.5–5.3)
POTASSIUM SERPL-SCNC: 3.9 MMOL/L — SIGNIFICANT CHANGE UP (ref 3.5–5.3)
PROT SERPL-MCNC: 6.9 G/DL — SIGNIFICANT CHANGE UP (ref 6–8.3)
RAPID RVP RESULT: DETECTED
RBC # BLD: 3.63 M/UL — LOW (ref 4.2–5.8)
RBC # FLD: 17.2 % — HIGH (ref 10.3–14.5)
RV+EV RNA SPEC QL NAA+PROBE: DETECTED
SODIUM SERPL-SCNC: 139 MMOL/L — SIGNIFICANT CHANGE UP (ref 135–145)
WBC # BLD: 9.5 K/UL — SIGNIFICANT CHANGE UP (ref 3.8–10.5)
WBC # FLD AUTO: 9.5 K/UL — SIGNIFICANT CHANGE UP (ref 3.8–10.5)

## 2019-09-25 RX ORDER — INSULIN GLARGINE 100 [IU]/ML
16 INJECTION, SOLUTION SUBCUTANEOUS AT BEDTIME
Refills: 0 | Status: DISCONTINUED | OUTPATIENT
Start: 2019-09-25 | End: 2019-09-26

## 2019-09-25 RX ORDER — FUROSEMIDE 40 MG
80 TABLET ORAL
Refills: 0 | Status: DISCONTINUED | OUTPATIENT
Start: 2019-09-25 | End: 2019-09-26

## 2019-09-25 RX ORDER — ENOXAPARIN SODIUM 100 MG/ML
28 INJECTION SUBCUTANEOUS
Qty: 0 | Refills: 0 | DISCHARGE

## 2019-09-25 RX ORDER — METOPROLOL TARTRATE 50 MG
75 TABLET ORAL
Refills: 0 | Status: DISCONTINUED | OUTPATIENT
Start: 2019-09-25 | End: 2019-09-26

## 2019-09-25 RX ADMIN — Medication 75 MILLIGRAM(S): at 21:34

## 2019-09-25 RX ADMIN — Medication 5 UNIT(S): at 17:20

## 2019-09-25 RX ADMIN — Medication 60 MILLIGRAM(S): at 05:28

## 2019-09-25 RX ADMIN — RIVAROXABAN 20 MILLIGRAM(S): KIT at 17:22

## 2019-09-25 RX ADMIN — Medication 100 MILLIGRAM(S): at 12:41

## 2019-09-25 RX ADMIN — Medication 75 MILLIGRAM(S): at 12:40

## 2019-09-25 RX ADMIN — TAMSULOSIN HYDROCHLORIDE 0.4 MILLIGRAM(S): 0.4 CAPSULE ORAL at 21:34

## 2019-09-25 RX ADMIN — Medication 81 MILLIGRAM(S): at 12:36

## 2019-09-25 RX ADMIN — Medication 180 MILLIGRAM(S): at 05:27

## 2019-09-25 RX ADMIN — INSULIN GLARGINE 16 UNIT(S): 100 INJECTION, SOLUTION SUBCUTANEOUS at 21:34

## 2019-09-25 RX ADMIN — ATORVASTATIN CALCIUM 80 MILLIGRAM(S): 80 TABLET, FILM COATED ORAL at 21:34

## 2019-09-25 RX ADMIN — Medication 100 MILLIGRAM(S): at 17:22

## 2019-09-25 RX ADMIN — Medication 80 MILLIGRAM(S): at 17:21

## 2019-09-25 RX ADMIN — Medication 1: at 12:35

## 2019-09-25 RX ADMIN — Medication 5 UNIT(S): at 12:35

## 2019-09-25 RX ADMIN — Medication 100 MILLIGRAM(S): at 05:27

## 2019-09-25 NOTE — PHARMACOTHERAPY INTERVENTION NOTE - COMMENTS
Confirmed home medications with patient and pharmacy, updated in Outpatient Medication Review. Discrepancies communicated with PA. Patient is on metoprolol succinate 75 mg twice daily at home.    Josh MagallonD  PGY-1 Pharmacy Resident  [phone]

## 2019-09-25 NOTE — CONSULT NOTE ADULT - ASSESSMENT
Assessment  DMT2: 64y Male with DM T2 with hyperglycemia, A1C 9.0%, was on insulin at home, now on insulin, had hypoglycemia episode, blood sugars now trending within acceptable range, eating meals,  non compliant with low carb diet. Comfortable, does not appear SOB.  HF: on meds, managed by primary team/cardiology.  HTN: Controlled,  on antihypertensive medications.  CKD: Monitor labs/BMP.            Myrna Rossi MD  Cell: [phone]  Office: [phone]

## 2019-09-25 NOTE — PROGRESS NOTE ADULT - SUBJECTIVE AND OBJECTIVE BOX
No pain, no shortness of breath      VITAL:  T(C): , Max: 37.1 (09-24-19 @ 17:34)  T(F): , Max: 98.7 (09-24-19 @ 17:34)  HR: 83 (09-25-19 @ 04:33)  BP: 135/63 (09-25-19 @ 04:33)  RR: 18 (09-25-19 @ 04:33)  SpO2: 94% (09-25-19 @ 04:33)      PHYSICAL EXAM:  Constitutional: NAD; Alert  HEENT:  NCAT; DMM  Neck: No JVD; supple  Respiratory: CTA-L; decreased at right base  Cardiac: RRR s1s2  Gastrointestinal: BS+, soft, NT/ND  Urologic: No moreno; (+)scrotal swelling  Extremities: 2+ b/l LE edema  Back: No CVAT b/l      LABS:                        10.6   14.0  )-----------( 409      ( 23 Sep 2019 19:14 )             34.1     Na(139)/K(3.9)/Cl(97)/HCO3(31)/BUN(19)/Cr(1.49)Glu(88)/Ca(9.4)/Mg(2.3)/PO4(3.4)    09-25 @ 07:12  Na(136)/K(4.2)/Cl(96)/HCO3(26)/BUN(20)/Cr(1.47)Glu(128)/Ca(9.7)/Mg(2.3)/PO4(3.7)    09-23 @ 19:14      IMPRESSION: 64M w/ HTN, DM2, CAD, AFib, mild dementia, and CKD3, s/p recent admission for acute on chronic diastolic congestive heart failure, 9/24/19 a/w RSV/CHF flare.    (1)CKD -stage 3; presumed early diabetic nephropathy. He had BARRY from prerenal azotemia during the last admission, but does not appear to have any BARRY at present.    (2)CV - mild CHF flare. Being treated with IV Lasix. Needs a higher dose of outpatient diuretics, as it appears he is not appropriately responding to Lasix 40mg PO dose.    RECOMMENDATIONS:  (1)Can advance to PO diuretics - Lasix 80bid + Metolazone 5qd            Markell Walton MD  [facility]  [phone] No pain, no shortness of breath      VITAL:  T(C): , Max: 37.1 (09-24-19 @ 17:34)  T(F): , Max: 98.7 (09-24-19 @ 17:34)  HR: 83 (09-25-19 @ 04:33)  BP: 135/63 (09-25-19 @ 04:33)  RR: 18 (09-25-19 @ 04:33)  SpO2: 94% (09-25-19 @ 04:33)      PHYSICAL EXAM:  Constitutional: NAD; Alert  HEENT:  NCAT; MMM  Neck: No JVD; supple  Respiratory: CTA-b/l  Cardiac: RRR s1s2  Gastrointestinal: BS+, soft, NT/ND  Urologic: No moreno  Extremities: trace b/l LE edema  Back: No CVAT b/l      LABS:                        10.6   14.0  )-----------( 409      ( 23 Sep 2019 19:14 )             34.1     Na(139)/K(3.9)/Cl(97)/HCO3(31)/BUN(19)/Cr(1.49)Glu(88)/Ca(9.4)/Mg(2.3)/PO4(3.4)    09-25 @ 07:12  Na(136)/K(4.2)/Cl(96)/HCO3(26)/BUN(20)/Cr(1.47)Glu(128)/Ca(9.7)/Mg(2.3)/PO4(3.7)    09-23 @ 19:14      IMPRESSION: 64M w/ HTN, DM2, CAD, AFib, mild dementia, and CKD3, s/p recent admission for acute on chronic diastolic congestive heart failure, 9/24/19 a/w RSV/CHF flare.    (1)CKD -stage 3; presumed early diabetic nephropathy. He had BARRY from prerenal azotemia during the last admission, but does not appear to have any BARRY at present.    (2)CV - mild CHF flare. Being treated with IV Lasix. Needs a higher dose of outpatient diuretics, as it appears he is not appropriately responding to Lasix 40mg PO dose.    RECOMMENDATIONS:  (1)Can advance to PO diuretics - Lasix 80bid + Metolazone 5qd  (2)No objection to discharge; could f/u at my office in 1-2 months          Markell Walton MD  [facility]  [phone]

## 2019-09-25 NOTE — PROGRESS NOTE ADULT - SUBJECTIVE AND OBJECTIVE BOX
Patient is a 64y old  Male who presents with a chief complaint of shortness of breath (25 Sep 2019 11:47)                                                               INTERVAL HPI/OVERNIGHT EVENTS:    REVIEW OF SYSTEMS:     CONSTITUTIONAL: No weakness, fevers or chills  EYES/ENT: No visual changes , no ear ache   NECK: No pain or stiffness  RESPIRATORY: No cough, wheezing,  No shortness of breath  CARDIOVASCULAR: No chest pain or palpitations  GASTROINTESTINAL: No abdominal pain  . No nausea, vomiting, or hematemesis; No diarrhea or constipation. No melena or hematochezia.  GENITOURINARY: No dysuria, frequency or hematuria  NEUROLOGICAL: No numbness or weakness                                                                                                                                                                                                                                                                                Medications:  MEDICATIONS  (STANDING):  aspirin enteric coated 81 milliGRAM(s) Oral daily  atorvastatin 80 milliGRAM(s) Oral at bedtime  benzonatate 100 milliGRAM(s) Oral three times a day  dextrose 5%. 1000 milliLiter(s) (50 mL/Hr) IV Continuous <Continuous>  dextrose 50% Injectable 12.5 Gram(s) IV Push once  dextrose 50% Injectable 25 Gram(s) IV Push once  dextrose 50% Injectable 25 Gram(s) IV Push once  diltiazem    milliGRAM(s) Oral daily  docusate sodium 100 milliGRAM(s) Oral two times a day  furosemide    Tablet 80 milliGRAM(s) Oral two times a day  insulin glargine Injectable (LANTUS) 16 Unit(s) SubCutaneous at bedtime  insulin lispro (HumaLOG) corrective regimen sliding scale   SubCutaneous three times a day before meals  insulin lispro (HumaLOG) corrective regimen sliding scale   SubCutaneous at bedtime  insulin lispro Injectable (HumaLOG) 5 Unit(s) SubCutaneous three times a day before meals  metolazone 5 milliGRAM(s) Oral daily  metoprolol succinate ER 75 milliGRAM(s) Oral two times a day  rivaroxaban 20 milliGRAM(s) Oral with dinner  senna 2 Tablet(s) Oral at bedtime  tamsulosin 0.4 milliGRAM(s) Oral at bedtime    MEDICATIONS  (PRN):  acetaminophen   Tablet .. 650 milliGRAM(s) Oral every 6 hours PRN Temp greater or equal to 38C (100.4F), Mild Pain (1 - 3), Moderate Pain (4 - 6)  dextrose 40% Gel 15 Gram(s) Oral once PRN Blood Glucose LESS THAN 70 milliGRAM(s)/deciliter  glucagon  Injectable 1 milliGRAM(s) IntraMuscular once PRN Glucose LESS THAN 70 milligrams/deciliter       Allergies    No Known Allergies    Intolerances      Vital Signs Last 24 Hrs  T(C): 36.6 (25 Sep 2019 13:15), Max: 37.1 (24 Sep 2019 17:34)  T(F): 97.8 (25 Sep 2019 13:15), Max: 98.7 (24 Sep 2019 17:34)  HR: 82 (25 Sep 2019 13:15) (82 - 94)  BP: 136/67 (25 Sep 2019 13:15) (118/63 - 148/65)  BP(mean): --  RR: 18 (25 Sep 2019 13:15) (18 - 20)  SpO2: 99% (25 Sep 2019 13:15) (94% - 99%)  CAPILLARY BLOOD GLUCOSE      POCT Blood Glucose.: 198 mg/dL (25 Sep 2019 12:06)  POCT Blood Glucose.: 70 mg/dL (25 Sep 2019 07:38)  POCT Blood Glucose.: 214 mg/dL (24 Sep 2019 22:37)  POCT Blood Glucose.: 120 mg/dL (24 Sep 2019 20:05)  POCT Blood Glucose.: 75 mg/dL (24 Sep 2019 19:45)  POCT Blood Glucose.: 66 mg/dL (24 Sep 2019 19:28)       @ 07: @ 07:00  --------------------------------------------------------  IN: 150 mL / OUT: 1600 mL / NET: -1450 mL     @ 07: @ 16:53  --------------------------------------------------------  IN: 440 mL / OUT: 2200 mL / NET: -1760 mL      Physical Exam:    Daily     Daily Weight in k.9 (25 Sep 2019 07:32)  General:  NAD   HEENT:  Nonicteric, PERRLA  CV:  RRR, S1S2   Lungs:  CTA   Abdomen:  Soft, non-tender, no distended, positive BS  Extremities:  edema B  imrpoved compared to yesterday   Neuro:  AAOx3, non-focal, grossly intact                                                                                                                                                                                                                                                                                                LABS:                               10.7   9.50  )-----------( 337      ( 25 Sep 2019 09:57 )             33.4                          139  |  97  |  19  ----------------------------<  88  3.9   |  31  |  1.49<H>    Ca    9.4      25 Sep 2019 07:12  Phos  3.4       Mg     2.3         TPro  6.9  /  Alb  3.7  /  TBili  1.3<H>  /  DBili  x   /  AST  37  /  ALT  33  /  AlkPhos  155<H>

## 2019-09-25 NOTE — CONSULT NOTE ADULT - SUBJECTIVE AND OBJECTIVE BOX
HPI:  This patient is a 64yoM with PMH of CAD s/p 3 stents, htn, hLd, T2DM c/b peripheral neuropathy, afib on AC who presents to the ED with complaint of SOB. He endorses having 2 days of dyspnea with exertion and orthopnea. He denies PND. His dyspnea has limited his ambulation. He has had worsening lower extremity edema. Left lower extremity has edema to thigh, right lower extremity extended to the right knee. Patient had 12 lbs weight gain from 148 to 160 lbs. He denies CP, palpitations, dizziness, lightheadedness, N/V.  He has had persistent dry cough.      The patient was last hospitalized from -2019 for LLE cellulitis. He completed a course of antibiotics and LLE US was negative for DVT. Pt was noted to be in afib with RVR and DCCV was canceled due to finding of early thormbus/smoke on LA on YVES. Rate control was continued with lopressor, cardizem. Pt was continued on xarelto. Pt was transfused 1 u PRBC for anemia, found to have bleeding to psoas muscle with hematoma. He was noted to have BARRY, thus lasix and ACEI were held.     In the ED, T 97.9F, HR 67, /64, RR 18, SpO2 99%RA  Pt was given lasix 40mg IVP x1. (24 Sep 2019 03:29)    Patient has history of diabetes, last A1C 9.0%, on insulin at home, had recent hypoglycemic episode, no polyuria polydipsia. Patient follows up with PCP for diabetes management.    PAST MEDICAL & SURGICAL HISTORY:  Afib  Essential hypertension, hypertension with unspecified goal  Hyperlipidemia, unspecified hyperlipidemia type  Type 2 diabetes mellitus  CAD in native artery  Former smoker  s/p Carotid Endarterectomy: left      FAMILY HISTORY:  FH: atrial fibrillation: sister  Family history of heart disease: father  age 71      Social History:    Outpatient Medications:    MEDICATIONS  (STANDING):  aspirin enteric coated 81 milliGRAM(s) Oral daily  atorvastatin 80 milliGRAM(s) Oral at bedtime  benzonatate 100 milliGRAM(s) Oral three times a day  dextrose 5%. 1000 milliLiter(s) (50 mL/Hr) IV Continuous <Continuous>  dextrose 50% Injectable 12.5 Gram(s) IV Push once  dextrose 50% Injectable 25 Gram(s) IV Push once  dextrose 50% Injectable 25 Gram(s) IV Push once  diltiazem    milliGRAM(s) Oral daily  docusate sodium 100 milliGRAM(s) Oral two times a day  furosemide    Tablet 80 milliGRAM(s) Oral two times a day  insulin glargine Injectable (LANTUS) 16 Unit(s) SubCutaneous at bedtime  insulin lispro (HumaLOG) corrective regimen sliding scale   SubCutaneous three times a day before meals  insulin lispro (HumaLOG) corrective regimen sliding scale   SubCutaneous at bedtime  insulin lispro Injectable (HumaLOG) 5 Unit(s) SubCutaneous three times a day before meals  metolazone 5 milliGRAM(s) Oral daily  metoprolol succinate ER 75 milliGRAM(s) Oral two times a day  rivaroxaban 20 milliGRAM(s) Oral with dinner  senna 2 Tablet(s) Oral at bedtime  tamsulosin 0.4 milliGRAM(s) Oral at bedtime    MEDICATIONS  (PRN):  acetaminophen   Tablet .. 650 milliGRAM(s) Oral every 6 hours PRN Temp greater or equal to 38C (100.4F), Mild Pain (1 - 3), Moderate Pain (4 - 6)  dextrose 40% Gel 15 Gram(s) Oral once PRN Blood Glucose LESS THAN 70 milliGRAM(s)/deciliter  glucagon  Injectable 1 milliGRAM(s) IntraMuscular once PRN Glucose LESS THAN 70 milligrams/deciliter      Allergies    No Known Allergies    Intolerances      Review of Systems:  Constitutional: No fever, no chills  Eyes: No blurry vision  Neuro: No tremors  HEENT: No pain, no neck swelling  Cardiovascular: No chest pain, no palpitations  Respiratory: Has SOB, no cough  GI: No nausea, vomiting, abdominal pain  : No dysuria  Skin: no rash  MSK: Has leg swelling.  Psych: no depression  Endocrine: no polyuria, polydipsia    ALL OTHER SYSTEMS REVIEWED AND NEGATIVE    UNABLE TO OBTAIN    PHYSICAL EXAM:  VITALS: T(C): 36.7 (19 @ 04:33)  T(F): 98.1 (19 @ 04:33), Max: 98.7 (19 @ 17:34)  HR: 83 (19 @ 04:33) (82 - 94)  BP: 135/63 (19 @ 04:33) (116/59 - 148/65)  RR:  (18 - 20)  SpO2:  (94% - 98%)  Wt(kg): --  GENERAL: NAD, well-groomed, well-developed  EYES: No proptosis, no lid lag  HEENT:  Atraumatic, Normocephalic  THYROID: Normal size, no palpable nodules  RESPIRATORY: Clear to auscultation bilaterally; No rales, rhonchi, wheezing  CARDIOVASCULAR: Si S2, No murmurs;  GI: Soft, non distended, normal bowel sounds  SKIN: Dry, intact, No rashes or lesions  MUSCULOSKELETAL: Has BL lower extremity edema.  NEURO:  no tremor, sensation decreased in feet BL,    POCT Blood Glucose.: 70 mg/dL (19 @ 07:38)  POCT Blood Glucose.: 214 mg/dL (19 @ 22:37)  POCT Blood Glucose.: 120 mg/dL (19 @ 20:05)  POCT Blood Glucose.: 75 mg/dL (19 @ 19:45)  POCT Blood Glucose.: 66 mg/dL (19 @ 19:28)  POCT Blood Glucose.: 202 mg/dL (19 @ 13:07)  POCT Blood Glucose.: 145 mg/dL (19 @ 07:46)                            10.7   9.50  )-----------( 337      ( 25 Sep 2019 09:57 )             33.4           139  |  97  |  19  ----------------------------<  88  3.9   |  31  |  1.49<H>    EGFR if : 57<L>  EGFR if non : 50<L>    Ca    9.4        Mg     2.3       Phos  3.4         TPro  6.9  /  Alb  3.7  /  TBili  1.3<H>  /  DBili  x   /  AST  37  /  ALT  33  /  AlkPhos  155<H>        Thyroid Function Tests:      Hemoglobin A1C, Whole Blood: 9.0 % <H> [4.0 - 5.6] (19 @ 09:06)  Hemoglobin A1C, Whole Blood: 8.7 % <H> [4.0 - 5.6] (19 @ 19:35)          Radiology:

## 2019-09-25 NOTE — CONSULT NOTE ADULT - PROBLEM SELECTOR RECOMMENDATION 9
Will decrease insulin to 16u Lantus at bedtime, continue Humalog and coverage scale. Will continue monitoring FS, log, and FU.  Patient counseled for compliance with consistent low carb diet.

## 2019-09-25 NOTE — PATIENT PROFILE ADULT - HAS THE PATIENT RECEIVED THE INFLUENZA VACCINE THIS SEASON?
Missed Telephone Call Letter   MRN:  [id number]  Name:  [facility]  Address:  [address] [place] [address]          We missed you! Our records indicate that we have attempted to reach you numerous times to no avail. We are concerned because your health is important to us. Please call our office at your earliest convenience. Sincerely,  Signature   Electronically signed by : Hugh Packer M.D.; 07/11/2017 6:18 PM CST. no...

## 2019-09-25 NOTE — PROGRESS NOTE ADULT - SUBJECTIVE AND OBJECTIVE BOX
Dyspnea is much improved but he remains edematous  /63  HR 83  Lungs clear  RR no murmurs  1-2+ edema bilateral    BUN 19  Crt 1.49    Imp:  CHF is improved.  Agree with Dr MARTHA Walton (Renal) re: increased PO Lasix  and the addition of Metolazone.  Need to monitor renal function and K+ closely

## 2019-09-25 NOTE — PATIENT PROFILE ADULT - FALL HARM RISK CONCLUSION
Celestine called to give Dr Radha Garcia names of the medications he was taking and brought from [facility].  Per patient he is on Controlip 160 mg and   Ripecromo, both for cholesterol.   Per Dr Radha Garcia we will wait for lab results and adjust them as needed to something we Universal Safety Interventions

## 2019-09-26 ENCOUNTER — TRANSCRIPTION ENCOUNTER (OUTPATIENT)
Age: 64
End: 2019-09-26

## 2019-09-26 VITALS — SYSTOLIC BLOOD PRESSURE: 115 MMHG | DIASTOLIC BLOOD PRESSURE: 71 MMHG | HEART RATE: 85 BPM

## 2019-09-26 LAB
ALBUMIN SERPL ELPH-MCNC: 3.5 G/DL — SIGNIFICANT CHANGE UP (ref 3.3–5)
ALP SERPL-CCNC: 146 U/L — HIGH (ref 40–120)
ALT FLD-CCNC: 31 U/L — SIGNIFICANT CHANGE UP (ref 10–45)
ANION GAP SERPL CALC-SCNC: 13 MMOL/L — SIGNIFICANT CHANGE UP (ref 5–17)
AST SERPL-CCNC: 37 U/L — SIGNIFICANT CHANGE UP (ref 10–40)
BILIRUB SERPL-MCNC: 1.1 MG/DL — SIGNIFICANT CHANGE UP (ref 0.2–1.2)
BUN SERPL-MCNC: 20 MG/DL — SIGNIFICANT CHANGE UP (ref 7–23)
CALCIUM SERPL-MCNC: 9.5 MG/DL — SIGNIFICANT CHANGE UP (ref 8.4–10.5)
CHLORIDE SERPL-SCNC: 96 MMOL/L — SIGNIFICANT CHANGE UP (ref 96–108)
CO2 SERPL-SCNC: 29 MMOL/L — SIGNIFICANT CHANGE UP (ref 22–31)
CREAT SERPL-MCNC: 1.5 MG/DL — HIGH (ref 0.5–1.3)
GLUCOSE BLDC GLUCOMTR-MCNC: 132 MG/DL — HIGH (ref 70–99)
GLUCOSE BLDC GLUCOMTR-MCNC: 176 MG/DL — HIGH (ref 70–99)
GLUCOSE BLDC GLUCOMTR-MCNC: 257 MG/DL — HIGH (ref 70–99)
GLUCOSE SERPL-MCNC: 121 MG/DL — HIGH (ref 70–99)
HCT VFR BLD CALC: 32.9 % — LOW (ref 39–50)
HGB BLD-MCNC: 10.2 G/DL — LOW (ref 13–17)
MAGNESIUM SERPL-MCNC: 2.2 MG/DL — SIGNIFICANT CHANGE UP (ref 1.6–2.6)
MCHC RBC-ENTMCNC: 28.5 PG — SIGNIFICANT CHANGE UP (ref 27–34)
MCHC RBC-ENTMCNC: 31 GM/DL — LOW (ref 32–36)
MCV RBC AUTO: 91.9 FL — SIGNIFICANT CHANGE UP (ref 80–100)
PLATELET # BLD AUTO: 335 K/UL — SIGNIFICANT CHANGE UP (ref 150–400)
POTASSIUM SERPL-MCNC: 3.8 MMOL/L — SIGNIFICANT CHANGE UP (ref 3.5–5.3)
POTASSIUM SERPL-SCNC: 3.8 MMOL/L — SIGNIFICANT CHANGE UP (ref 3.5–5.3)
PROT SERPL-MCNC: 6.4 G/DL — SIGNIFICANT CHANGE UP (ref 6–8.3)
RBC # BLD: 3.58 M/UL — LOW (ref 4.2–5.8)
RBC # FLD: 16.6 % — HIGH (ref 10.3–14.5)
SODIUM SERPL-SCNC: 138 MMOL/L — SIGNIFICANT CHANGE UP (ref 135–145)
WBC # BLD: 8.89 K/UL — SIGNIFICANT CHANGE UP (ref 3.8–10.5)
WBC # FLD AUTO: 8.89 K/UL — SIGNIFICANT CHANGE UP (ref 3.8–10.5)

## 2019-09-26 PROCEDURE — 96374 THER/PROPH/DIAG INJ IV PUSH: CPT

## 2019-09-26 PROCEDURE — 82553 CREATINE MB FRACTION: CPT

## 2019-09-26 PROCEDURE — 93306 TTE W/DOPPLER COMPLETE: CPT | Mod: 26

## 2019-09-26 PROCEDURE — 84484 ASSAY OF TROPONIN QUANT: CPT

## 2019-09-26 PROCEDURE — 82962 GLUCOSE BLOOD TEST: CPT

## 2019-09-26 PROCEDURE — 93306 TTE W/DOPPLER COMPLETE: CPT

## 2019-09-26 PROCEDURE — 84100 ASSAY OF PHOSPHORUS: CPT

## 2019-09-26 PROCEDURE — 87633 RESP VIRUS 12-25 TARGETS: CPT

## 2019-09-26 PROCEDURE — 83735 ASSAY OF MAGNESIUM: CPT

## 2019-09-26 PROCEDURE — 85730 THROMBOPLASTIN TIME PARTIAL: CPT

## 2019-09-26 PROCEDURE — 80053 COMPREHEN METABOLIC PANEL: CPT

## 2019-09-26 PROCEDURE — 82550 ASSAY OF CK (CPK): CPT

## 2019-09-26 PROCEDURE — 71046 X-RAY EXAM CHEST 2 VIEWS: CPT

## 2019-09-26 PROCEDURE — 99285 EMERGENCY DEPT VISIT HI MDM: CPT | Mod: 25

## 2019-09-26 PROCEDURE — 87581 M.PNEUMON DNA AMP PROBE: CPT

## 2019-09-26 PROCEDURE — 93005 ELECTROCARDIOGRAM TRACING: CPT

## 2019-09-26 PROCEDURE — 85027 COMPLETE CBC AUTOMATED: CPT

## 2019-09-26 PROCEDURE — 87486 CHLMYD PNEUM DNA AMP PROBE: CPT

## 2019-09-26 PROCEDURE — 83880 ASSAY OF NATRIURETIC PEPTIDE: CPT

## 2019-09-26 PROCEDURE — 85610 PROTHROMBIN TIME: CPT

## 2019-09-26 PROCEDURE — 87798 DETECT AGENT NOS DNA AMP: CPT

## 2019-09-26 RX ORDER — METOPROLOL TARTRATE 50 MG
3 TABLET ORAL
Qty: 0 | Refills: 0 | DISCHARGE
Start: 2019-09-26

## 2019-09-26 RX ORDER — RIVAROXABAN 15 MG-20MG
1 KIT ORAL
Qty: 0 | Refills: 0 | DISCHARGE
Start: 2019-09-26

## 2019-09-26 RX ORDER — ACETAMINOPHEN 500 MG
2 TABLET ORAL
Qty: 0 | Refills: 0 | DISCHARGE

## 2019-09-26 RX ORDER — DOCUSATE SODIUM 100 MG
1 CAPSULE ORAL
Qty: 0 | Refills: 0 | DISCHARGE
Start: 2019-09-26

## 2019-09-26 RX ORDER — ATORVASTATIN CALCIUM 80 MG/1
1 TABLET, FILM COATED ORAL
Qty: 0 | Refills: 0 | DISCHARGE
Start: 2019-09-26

## 2019-09-26 RX ORDER — INSULIN LISPRO 100/ML
7 VIAL (ML) SUBCUTANEOUS
Qty: 0 | Refills: 0 | DISCHARGE
Start: 2019-09-26

## 2019-09-26 RX ORDER — INSULIN LISPRO 100/ML
7 VIAL (ML) SUBCUTANEOUS
Refills: 0 | Status: DISCONTINUED | OUTPATIENT
Start: 2019-09-26 | End: 2019-09-26

## 2019-09-26 RX ORDER — INSULIN GLARGINE 100 [IU]/ML
16 INJECTION, SOLUTION SUBCUTANEOUS
Qty: 0 | Refills: 0 | DISCHARGE
Start: 2019-09-26

## 2019-09-26 RX ORDER — INSULIN GLARGINE 100 [IU]/ML
20 INJECTION, SOLUTION SUBCUTANEOUS AT BEDTIME
Refills: 0 | Status: DISCONTINUED | OUTPATIENT
Start: 2019-09-26 | End: 2019-09-26

## 2019-09-26 RX ORDER — SENNA PLUS 8.6 MG/1
2 TABLET ORAL
Qty: 0 | Refills: 0 | DISCHARGE
Start: 2019-09-26

## 2019-09-26 RX ORDER — ATORVASTATIN CALCIUM 80 MG/1
1 TABLET, FILM COATED ORAL
Qty: 0 | Refills: 0 | DISCHARGE

## 2019-09-26 RX ORDER — ENOXAPARIN SODIUM 100 MG/ML
33 INJECTION SUBCUTANEOUS
Qty: 0 | Refills: 0 | DISCHARGE

## 2019-09-26 RX ORDER — FUROSEMIDE 40 MG
1 TABLET ORAL
Qty: 60 | Refills: 0
Start: 2019-09-26 | End: 2019-10-25

## 2019-09-26 RX ORDER — FUROSEMIDE 40 MG
1 TABLET ORAL
Qty: 30 | Refills: 0
Start: 2019-09-26 | End: 2019-10-25

## 2019-09-26 RX ORDER — POTASSIUM CHLORIDE 20 MEQ
20 PACKET (EA) ORAL ONCE
Refills: 0 | Status: COMPLETED | OUTPATIENT
Start: 2019-09-26 | End: 2019-09-26

## 2019-09-26 RX ORDER — AMLODIPINE BESYLATE 2.5 MG/1
1 TABLET ORAL
Qty: 0 | Refills: 0 | DISCHARGE

## 2019-09-26 RX ORDER — RIVAROXABAN 15 MG-20MG
1 KIT ORAL
Qty: 30 | Refills: 0
Start: 2019-09-26 | End: 2019-10-25

## 2019-09-26 RX ORDER — DILTIAZEM HCL 120 MG
1 CAPSULE, EXT RELEASE 24 HR ORAL
Qty: 0 | Refills: 0 | DISCHARGE
Start: 2019-09-26

## 2019-09-26 RX ADMIN — Medication 100 MILLIGRAM(S): at 05:11

## 2019-09-26 RX ADMIN — Medication 100 MILLIGRAM(S): at 14:28

## 2019-09-26 RX ADMIN — Medication 75 MILLIGRAM(S): at 17:06

## 2019-09-26 RX ADMIN — RIVAROXABAN 20 MILLIGRAM(S): KIT at 17:06

## 2019-09-26 RX ADMIN — Medication 7 UNIT(S): at 17:27

## 2019-09-26 RX ADMIN — Medication 20 MILLIEQUIVALENT(S): at 14:26

## 2019-09-26 RX ADMIN — Medication 1: at 17:29

## 2019-09-26 RX ADMIN — Medication 81 MILLIGRAM(S): at 11:43

## 2019-09-26 RX ADMIN — Medication 75 MILLIGRAM(S): at 05:11

## 2019-09-26 RX ADMIN — Medication 5 UNIT(S): at 12:50

## 2019-09-26 RX ADMIN — Medication 80 MILLIGRAM(S): at 05:11

## 2019-09-26 RX ADMIN — Medication 3: at 12:49

## 2019-09-26 RX ADMIN — Medication 5 UNIT(S): at 09:02

## 2019-09-26 RX ADMIN — Medication 180 MILLIGRAM(S): at 05:12

## 2019-09-26 NOTE — DISCHARGE NOTE PROVIDER - HOSPITAL COURSE
64M with PMHx of CAD (post-three stents), HTN, HLD, T2DM (complicated by peripheral neuropathy), afib on Xarelto, LLE hematoma presents with SOB associated with LLE secondary to  acute on chronic diastolic CHF. Echo shows ____    s/p IV Lasix with great response. Patient continues to remain stable on increased dose of PO Lasix and Metolazone with good UO. Stable renal function. Patient had a hypoglycemic event, s/p nutrition consult with endocrine consult Patient will be  discharged home with decreased dose of Lantus and diuretics with close follow up with Cardiologist. 64M with PMHx of CAD (post-three stents), HTN, HLD, T2DM (complicated by peripheral neuropathy), afib on Xarelto, LLE hematoma presents with SOB associated with LLE secondary to  acute on chronic diastolic CHF. Echo is unchanged from prior study: grossly normal with EF 55%. s/p IV Lasix with great response. Patient continues to remain stable on increased dose of PO Lasix and Metolazone with good UO. Stable renal function. Patient had a hypoglycemic event, s/p nutrition consult with endocrine consult Patient will be  discharged home with decreased dose of Lantus and diuretics with close follow up with Cardiologist. 64M with PMHx of CAD (post-three stents), HTN, HLD, T2DM (complicated by peripheral neuropathy), afib on Xarelto, LLE hematoma presents with SOB associated with LLE secondary to  acute on chronic diastolic CHF. Echo is unchanged from prior study: grossly normal with EF 55%. s/p IV Lasix with great response. Patient continues to remain stable on increased dose of PO Lasix and Metolazone with good UO. Stable renal function. Patient had a hypoglycemic event, s/p nutrition with endocrine consult Patient to be discharged home with reduced dose of Lantus, Humalog, and diuretics with close follow up with Cardiologist. 64M with PMHx of CAD (post-three stents), HTN, HLD, T2DM (complicated by peripheral neuropathy), afib on Xarelto, LLE hematoma presents with SOB associated with LLE secondary to  acute on chronic diastolic CHF. Echo is unchanged from prior study: grossly normal with EF 55%. s/p IV Lasix with great response. Patient continues to remain stable on increased dose of PO Lasix and Metolazone with good UO. Stable renal function. Patient had a hypoglycemic event, s/p nutrition with endocrine consult Patient to be discharged home with reduced dose of Lantus, Humalog, and diuretics with close follow up with Cardiologist.             Attending :      64M with PMHx of CAD (post-three stents), HTN, HLD, T2DM (complicated by peripheral neuropathy), afib on AC presenting, LLE hematoma now admitted with SOB sec to acute on chronic diastolic CHF         Problem/Plan - 1:    ·  Problem: Acute diastolic heart failure.  Plan: likley sec to noncompliance with diet... nutrition  consult and re emphasized compliance with low salt intake     discussed with Dr. Moon: will decrease lasix to 80 qd and metolazone to 2.5     monitor as op closely            Problem/Plan - 2:    ·  Problem: CAD in native artery.  Plan:  Patient last had stent placed in 2016, thus is no longer taking brilinta. He denies any chest pain at this time.     cont cardiac meds         Problem/Plan - 3:    ·  Problem: Type 2 diabetes mellitus.  Plan:  with episodes of hypoglycemia...    insluin per endo                 Problem/Plan - 4:    ·  Problem: CKD (chronic kidney disease). Plan:  ACEI was held during previous admission. Continue to hold for now. Will plan to resume when renal function improves.        Problem/Plan - 5:    ·  Problem: Afib. Plan: Start home dose of diltiazem and metoprolol for rate control.    cont xarelto    Patient was planned for repeat cardiac evaluation on 10/17/2019 for potential cardioversion since last hospitalization found LV smoke  which precluded potential DCCV.    discussed with pharmacy and cardio : will cont xarelto given it is qd and monitor Cr...  will consider change to eliquis if worsening Cr        Problem/Plan - 6:    Problem: HLD (hyperlipidemia).Plan: Start home dose of atorvastatin 80mg PO qd.        Problem/Plan - 7:    ·  Problem: HTN (hypertension). Plan: d/c norvasc ( might be contributing to LE edema though low dose )     and cont cardizem and bb ..

## 2019-09-26 NOTE — DISCHARGE NOTE PROVIDER - NSDCCPCAREPLAN_GEN_ALL_CORE_FT
PRINCIPAL DISCHARGE DIAGNOSIS  Diagnosis: Congestive heart failure  Assessment and Plan of Treatment: Please follow up with your Cardiologist closely and follow diet recommendation as directed   Weigh yourself daily.  If you gain 3lbs in 3 days, or 5lbs in a week call your Health Care Provider.  Do not eat or drink foods containing more than 2000mg of salt (sodium) in your diet every day.  Call your Health Care Provider if you have any swelling or increased swelling in your feet, ankles, and/or stomach.  Take all of your medication as directed.  If you become dizzy call your Health Care Provider.        SECONDARY DISCHARGE DIAGNOSES  Diagnosis: CKD (chronic kidney disease)  Assessment and Plan of Treatment: Avoid taking (NSAIDs) - (ex: Ibuprofen, Advil, Celebrex, Naprosyn)  Avoid taking any nephrotoxic agents (can harm kidneys) - Intravenous contrast for diagnostic testing, combination cold medications.  Have all medications adjusted for your renal function by your Health Care Provider.  Blood pressure control is important.  Take all medication as prescribed.      Diagnosis: Diabetes mellitus  Assessment and Plan of Treatment: HgA1C was noted to be high at 9.  Make sure you get your HgA1c checked every three months.  If you take oral diabetes medications, check your blood glucose two times a day.  If you take insulin, check your blood glucose before meals and at bedtime.  It's important not to skip any meals.  Keep a log of your blood glucose results and always take it with you to your doctor appointments.  Keep a list of your current medications including injectables and over the counter medications and bring this medication list with you to all your doctor appointments.  If you have not seen your opthalmologist this year call for appointment.  Check your feet daily for redness, sores, or openings. Do not self treat. If no improvement in two days call your primary care physician for an appointment.  Low blood sugar (hypoglycemia) is a blood sugar below 70mg/dl. Check your blood sugar if you feel signs/symptoms of hypoglycemia. If your blood sugar is below 70 take 15 grams of carbohydrates (ex 4 oz of apple juice, 3-4 glucosr tablets, or 4-6 oz of regular soda) wait 15 minutes and repeat blood sugar to make sure it comes up above 70.  If your blood sugar is above 70 and you are due for a meal, have a meal.  If you are not due for a meal have a snack.  This snack helps keeps your blood sugar at a safe range. PRINCIPAL DISCHARGE DIAGNOSIS  Diagnosis: Congestive heart failure  Assessment and Plan of Treatment: Echo shows normal heart function without valve issue with ejection fraction of 55%. Please follow up with your Cardiologist closely by October 1st and follow diet recommendation as directed  Weigh yourself daily.  If you gain 3lbs in 3 days, or 5lbs in a week call your Health Care Provider.  Do not eat or drink foods containing more than 2000mg of salt (sodium) in your diet every day.  Call your Health Care Provider if you have any swelling or increased swelling in your feet, ankles, and/or stomach.  Take all of your medication as directed.  If you become dizzy call your Health Care Provider.        SECONDARY DISCHARGE DIAGNOSES  Diagnosis: CKD (chronic kidney disease)  Assessment and Plan of Treatment: Avoid taking (NSAIDs) - (ex: Ibuprofen, Advil, Celebrex, Naprosyn)  Avoid taking any nephrotoxic agents (can harm kidneys) - Intravenous contrast for diagnostic testing, combination cold medications.  Have all medications adjusted for your renal function by your Health Care Provider.  Blood pressure control is important.  Take all medication as prescribed.      Diagnosis: Diabetes mellitus  Assessment and Plan of Treatment: HgA1C was noted to be high at 9.  Make sure you get your HgA1c checked every three months.  If you take oral diabetes medications, check your blood glucose two times a day.  If you take insulin, check your blood glucose before meals and at bedtime.  It's important not to skip any meals.  Keep a log of your blood glucose results and always take it with you to your doctor appointments.  Keep a list of your current medications including injectables and over the counter medications and bring this medication list with you to all your doctor appointments.  If you have not seen your opthalmologist this year call for appointment.  Check your feet daily for redness, sores, or openings. Do not self treat. If no improvement in two days call your primary care physician for an appointment.  Low blood sugar (hypoglycemia) is a blood sugar below 70mg/dl. Check your blood sugar if you feel signs/symptoms of hypoglycemia. If your blood sugar is below 70 take 15 grams of carbohydrates (ex 4 oz of apple juice, 3-4 glucosr tablets, or 4-6 oz of regular soda) wait 15 minutes and repeat blood sugar to make sure it comes up above 70.  If your blood sugar is above 70 and you are due for a meal, have a meal.  If you are not due for a meal have a snack.  This snack helps keeps your blood sugar at a safe range.

## 2019-09-26 NOTE — PROGRESS NOTE ADULT - ASSESSMENT
64M with PMHx of CAD (post-three stents), HTN, HLD, T2DM (complicated by peripheral neuropathy), afib on AC presenting, LLE hematoma now admitted with SOB sec to acute on chronic diastolic CHF     Problem/Plan - 1:  ·  Problem: Acute diastolic heart failure.  Plan: marjan sec to noncompliance with diet... nutrition  consult and re emphesized complaice with low salt intake   cont diuresis per cardio with lasix and metolazone .. monitor Cr and K       Problem/Plan - 2:  ·  Problem: CAD in native artery.  Plan:  Patient last had stent placed in 2016, thus is no longer taking brilinta. He denies any chest pain at this time.   cont cardiac meds     Problem/Plan - 3:  ·  Problem: Type 2 diabetes mellitus.  Plan:  with episodes of hypoglycemia...  discussed with Dr. Rossi : marjan sec to recieving Lantus in AM yesterday morning   monitor for now       Problem/Plan - 4:  ·  Problem: CKD (chronic kidney disease). Plan:  ACEI was held during previous admission. Continue to hold for now. Will plan to resume when renal function improves.    Problem/Plan - 5:  ·  Problem: Afib. Plan: Start home dose of diltiazem and metoprolol for rate control.  cont xarelto  Patient was planned for repeat cardiac evaluation on 10/17/2019 for potential cardioversion since last hospitalization found LV smoke  which precluded potential DCCV.    Problem/Plan - 6:  Problem: HLD (hyperlipidemia).Plan: Start home dose of atorvastatin 80mg PO qd.    Problem/Plan - 7:  ·  Problem: HTN (hypertension). Plan: d/c norvasc ( might be contributing to LE edema though low dose )   and cont cardizem and bb ..       Problem/Plan - 8:  ·  Problem: Prophylactic measure.  Plan: VTE ppx: continue xarelto  Activity: OOB with assistance  Diet: DASH, consistent carb.
64M with PMHx of CAD (post-three stents), HTN, HLD, T2DM (complicated by peripheral neuropathy), afib on AC presenting, LLE hematoma now admitted with SOB secondary to acute on chronic diastolic CHF   - Diuresed well on metolazone and Lasix 80   - Change to PO ? 2.5 metolazone Lasix 80 QD as outpt  - appreciate nephrology follow up  - d/w pharmacist cont Xarelto 20 QD    - follow up as out pt 1 week   - D/W Dr Mcdaniel
Assessment  DMT2: 64y Male with DM T2 with hyperglycemia, A1C 9.0%, was on insulin at home, now on insulin, blood sugars trending within acceptable range, eating meals, non compliant with low carb diet. Comfortable, reports his leg swelling/redness is much improved.  HF: on meds, managed by primary team/cardiology.  HTN: Controlled,  on antihypertensive medications.  CKD: Monitor labs/BMP.            Myrna Rossi MD  Cell: [phone]  Office: [phone]

## 2019-09-26 NOTE — DISCHARGE NOTE NURSING/CASE MANAGEMENT/SOCIAL WORK - NSDCFUADDAPPT_GEN_ALL_CORE_FT
Please follow up with Dr. Moon by October 1st for further management  Please follow up with Endocrinologist to manage your diabetes in a month   Please follow up with Dr. Walton to monitor your kidney function in a month

## 2019-09-26 NOTE — PROGRESS NOTE ADULT - SUBJECTIVE AND OBJECTIVE BOX
Patient is a 64y old  Male who presents with a chief complaint of shortness of breath (26 Sep 2019 10:58)                                                               INTERVAL HPI/OVERNIGHT EVENTS:    REVIEW OF SYSTEMS:     CONSTITUTIONAL: No weakness, fevers or chills  EYES/ENT: No visual changes , no ear ache   NECK: No pain or stiffness  RESPIRATORY: No cough, wheezing,  No shortness of breath  CARDIOVASCULAR: No chest pain or palpitations  GASTROINTESTINAL: No abdominal pain  . No nausea, vomiting, or hematemesis; No diarrhea or constipation. No melena or hematochezia.  GENITOURINARY: No dysuria, frequency or hematuria  NEUROLOGICAL: No numbness or weakness  SKIN: No itching, burning, rashes, or lesions                                                                                                                                                                                                                                                                                 Medications:  MEDICATIONS  (STANDING):  aspirin enteric coated 81 milliGRAM(s) Oral daily  atorvastatin 80 milliGRAM(s) Oral at bedtime  benzonatate 100 milliGRAM(s) Oral three times a day  dextrose 5%. 1000 milliLiter(s) (50 mL/Hr) IV Continuous <Continuous>  dextrose 50% Injectable 12.5 Gram(s) IV Push once  dextrose 50% Injectable 25 Gram(s) IV Push once  dextrose 50% Injectable 25 Gram(s) IV Push once  diltiazem    milliGRAM(s) Oral daily  docusate sodium 100 milliGRAM(s) Oral two times a day  furosemide    Tablet 80 milliGRAM(s) Oral two times a day  insulin glargine Injectable (LANTUS) 16 Unit(s) SubCutaneous at bedtime  insulin lispro (HumaLOG) corrective regimen sliding scale   SubCutaneous three times a day before meals  insulin lispro (HumaLOG) corrective regimen sliding scale   SubCutaneous at bedtime  insulin lispro Injectable (HumaLOG) 5 Unit(s) SubCutaneous three times a day before meals  metolazone 5 milliGRAM(s) Oral daily  metoprolol succinate ER 75 milliGRAM(s) Oral two times a day  rivaroxaban 20 milliGRAM(s) Oral with dinner  senna 2 Tablet(s) Oral at bedtime  tamsulosin 0.4 milliGRAM(s) Oral at bedtime    MEDICATIONS  (PRN):  acetaminophen   Tablet .. 650 milliGRAM(s) Oral every 6 hours PRN Temp greater or equal to 38C (100.4F), Mild Pain (1 - 3), Moderate Pain (4 - 6)  dextrose 40% Gel 15 Gram(s) Oral once PRN Blood Glucose LESS THAN 70 milliGRAM(s)/deciliter  glucagon  Injectable 1 milliGRAM(s) IntraMuscular once PRN Glucose LESS THAN 70 milligrams/deciliter       Allergies    No Known Allergies    Intolerances      Vital Signs Last 24 Hrs  T(C): 36.7 (26 Sep 2019 04:07), Max: 36.7 (25 Sep 2019 20:45)  T(F): 98.1 (26 Sep 2019 04:07), Max: 98.1 (25 Sep 2019 20:45)  HR: 73 (26 Sep 2019 04:07) (61 - 82)  BP: 120/61 (26 Sep 2019 04:07) (116/63 - 136/67)  BP(mean): --  RR: 18 (26 Sep 2019 04:07) (18 - 18)  SpO2: 99% (26 Sep 2019 04:07) (99% - 99%)  CAPILLARY BLOOD GLUCOSE      POCT Blood Glucose.: 132 mg/dL (26 Sep 2019 08:25)  POCT Blood Glucose.: 245 mg/dL (25 Sep 2019 21:31)  POCT Blood Glucose.: 129 mg/dL (25 Sep 2019 17:11)       @ 07:01  -   @ 07:00  --------------------------------------------------------  IN: 980 mL / OUT: 3700 mL / NET: -2720 mL      Physical Exam:    Daily     Daily Weight in k.2 (26 Sep 2019 11:02)  General:  Well appearing, NAD, not cachetic  HEENT:  Nonicteric, PERRLA  CV:  RRR, S1S2   Lungs:  CTA B/L, no wheezes, rales, rhonchi  Abdomen:  Soft, non-tender, no distended, positive BS  Extremities:  2+ pulses, no c/c, no edema  Skin:  Warm and dry, no rashes  :  No moreno  Neuro:  AAOx3, non-focal, grossly intact                                                                                                                                                                                                                                                                                                LABS:                               10.2   8.89  )-----------( 335      ( 26 Sep 2019 08:44 )             32.9                          138  |  96  |  20  ----------------------------<  121<H>  3.8   |  29  |  1.50<H>    Ca    9.5      26 Sep 2019 07:06  Phos  3.4       Mg     2.2         TPro  6.4  /  Alb  3.5  /  TBili  1.1  /  DBili  x   /  AST  37  /  ALT  31  /  AlkPhos  146<H>                         RADIOLOGY & ADDITIONAL TESTS         I personally reviewed: [  ]EKG   [  ]CXR    [  ] CT      A/P:         Discussed with :     Derek consultants' Notes   Time spent :

## 2019-09-26 NOTE — PROGRESS NOTE ADULT - SUBJECTIVE AND OBJECTIVE BOX
SUBJECTIVE:  No CP or SOB. Feeling much better    MEDICATIONS:  diltiazem    milliGRAM(s) Oral daily  furosemide    Tablet 80 milliGRAM(s) Oral two times a day  metolazone 5 milliGRAM(s) Oral daily  metoprolol succinate ER 75 milliGRAM(s) Oral two times a day  tamsulosin 0.4 milliGRAM(s) Oral at bedtime      benzonatate 100 milliGRAM(s) Oral three times a day    acetaminophen   Tablet .. 650 milliGRAM(s) Oral every 6 hours PRN    docusate sodium 100 milliGRAM(s) Oral two times a day  senna 2 Tablet(s) Oral at bedtime    atorvastatin 80 milliGRAM(s) Oral at bedtime  dextrose 40% Gel 15 Gram(s) Oral once PRN  dextrose 50% Injectable 12.5 Gram(s) IV Push once  dextrose 50% Injectable 25 Gram(s) IV Push once  dextrose 50% Injectable 25 Gram(s) IV Push once  glucagon  Injectable 1 milliGRAM(s) IntraMuscular once PRN  insulin glargine Injectable (LANTUS) 16 Unit(s) SubCutaneous at bedtime  insulin lispro (HumaLOG) corrective regimen sliding scale   SubCutaneous three times a day before meals  insulin lispro (HumaLOG) corrective regimen sliding scale   SubCutaneous at bedtime  insulin lispro Injectable (HumaLOG) 5 Unit(s) SubCutaneous three times a day before meals    aspirin enteric coated 81 milliGRAM(s) Oral daily  dextrose 5%. 1000 milliLiter(s) IV Continuous <Continuous>  rivaroxaban 20 milliGRAM(s) Oral with dinner      REVIEW OF SYSTEMS:    CONSTITUTIONAL: No fever, weight loss, or fatigue  EYES: No eye pain, visual disturbances, or discharge  NECK: No pain or stiffness  RESPIRATORY: No cough, wheezing, chills or hemoptysis; No Shortness of Breath  CARDIOVASCULAR: No chest pain, palpitations, dizziness, or leg swelling  GASTROINTESTINAL: No abdominal or epigastric pain. No nausea, vomiting, or hematemesis; No diarrhea or constipation. No melena or hematochezia.  GENITOURINARY: No dysuria, frequency, hematuria, or incontinence  NEUROLOGICAL: No headaches, memory loss, loss of strength, numbness, or tremors  SKIN: No itching, burning, rashes, or lesions   LYMPH Nodes: No enlarged glands  MUSCULOSKELETAL: No joint pain or swelling; No muscle, back, or extremity pain  All other review of systems are negative.  	  [ ] Unable to obtain    PHYSICAL EXAM:  T(C): 36.7 (09-26-19 @ 04:07), Max: 36.7 (09-25-19 @ 20:45)  HR: 73 (09-26-19 @ 04:07) (61 - 82)  BP: 120/61 (09-26-19 @ 04:07) (116/63 - 136/67)  RR: 18 (09-26-19 @ 04:07) (18 - 18)  SpO2: 99% (09-26-19 @ 04:07) (99% - 99%)  Wt(kg): --  I&O's Summary    25 Sep 2019 07:01  -  26 Sep 2019 07:00  --------------------------------------------------------  IN: 980 mL / OUT: 3700 mL / NET: -2720 mL          PHYSICAL EXAM    Appearance: Normal	  HEENT:   Normal oral mucosa, PERRL, EOMI	  NECK: Soft and supple, No LAD, No JVD  Cardiovascular: Regular Rate and Rhythm, Normal S1 S2, No murmurs, No clicks, gallops or rubs  Respiratory: Lungs clear to auscultation	  Gastrointestinal:  Soft, Non-tender, + BS	  Skin: No rashes, No ecchymoses, No cyanosis  Neurologic: Non-focal  Extremities: No clubbing, cyanosis, 1+ edema  Vascular: Peripheral pulses palpable 2+ bilaterally    TELEMETRY: 	  AF 90 - 100    LABS:	 	                      10.2   8.89  )-----------( 335      ( 26 Sep 2019 08:44 )             32.9     09-26    138  |  96  |  20  ----------------------------<  121<H>  3.8   |  29  |  1.50<H>    Ca    9.5      26 Sep 2019 07:06  Phos  3.4     09-25  Mg     2.2     09-26    TPro  6.4  /  Alb  3.5  /  TBili  1.1  /  DBili  x   /  AST  37  /  ALT  31  /  AlkPhos  146<H>  09-26

## 2019-09-26 NOTE — DISCHARGE NOTE PROVIDER - PROVIDER TOKENS
PROVIDER:[TOKEN:[[id number]],FOLLOWUP:[1 week]],PROVIDER:[TOKEN:[[id number]],FOLLOWUP:[1 week]],PROVIDER:[TOKEN:[[id number]],FOLLOWUP:[1 month]] PROVIDER:[TOKEN:[[id number]],FOLLOWUP:[1 week]],PROVIDER:[TOKEN:[[id number]],FOLLOWUP:[1 week]],PROVIDER:[TOKEN:[[id number]],FOLLOWUP:[1 month]],PROVIDER:[TOKEN:[[id number]],FOLLOWUP:[2 weeks]]

## 2019-09-26 NOTE — DISCHARGE NOTE PROVIDER - CARE PROVIDER_API CALL
Alfred Moon)  Cardiology; Internal Medicine  [address] [place] [address]  Phone: [phone]  Fax: [phone]  Follow Up Time: 1 week    Dennis Kumar)  Family Medicine  [address] [place] [address]  Phone: [phone]  Fax: [phone]  Follow Up Time: 1 week    Markell Walton)  Internal Medicine; Nephrology  [address] [address]  Phone: [phone]  Fax: [phone]  Follow Up Time: 1 month Alfred Moon)  Cardiology; Internal Medicine  [address] [place] [address]  Phone: [phone]  Fax: [phone]  Follow Up Time: 1 week    Dennis Kumar)  Family Medicine  [address] [address]  Phone: [phone]  Fax: [phone]  Follow Up Time: 1 week    Markell Walton)  Internal Medicine; Nephrology  [address] [address]  Phone: [phone]  Fax: [phone]  Follow Up Time: 1 month    Myrna Rossi)  EndocrinologyMetabDiabetes; Internal Medicine  [address] [place] [address]  Phone: [phone]  Fax: [phone]  Follow Up Time: 2 weeks

## 2019-09-26 NOTE — DIETITIAN INITIAL EVALUATION ADULT. - REASON INDICATOR FOR ASSESSMENT
Nutrition consult warranted for RD Assessment/Education  Source: comprehensive chart review, Pt, team rounds Nutrition consult warranted for RD Education prior to discharge  Source: comprehensive chart review, Pt, team rounds

## 2019-09-26 NOTE — DISCHARGE NOTE PROVIDER - NSDCFUADDAPPT_GEN_ALL_CORE_FT
Please follow up with Dr. Moon by October 1st for further management  Please follow up with Endocrinologist to manage your diabetes  Please follow up with Dr. Walton to monitor your kidney function Please follow up with Dr. Moon by October 1st for further management  Please follow up with Endocrinologist to manage your diabetes in a month   Please follow up with Dr. Walton to monitor your kidney function in a month

## 2019-09-26 NOTE — PROGRESS NOTE ADULT - SUBJECTIVE AND OBJECTIVE BOX
No pain, no shortness of breath      VITAL:  T(C): , Max: 36.7 (09-25-19 @ 20:45)  T(F): , Max: 98.1 (09-25-19 @ 20:45)  HR: 81 (09-26-19 @ 12:15)  BP: 127/72 (09-26-19 @ 12:15)  RR: 18 (09-26-19 @ 12:15)  SpO2: 95% (09-26-19 @ 12:15)      PHYSICAL EXAM:    Constitutional: NAD; Alert  HEENT:  NCAT; DMM  Neck: No JVD; supple  Respiratory: CTA-b/l  Cardiac: RRR s1s2  Gastrointestinal: BS+, soft, NT/ND  Urologic: No moreno  Extremities: No peripheral edema  Back: No CVAT b/l    LABS:                        10.2   8.89  )-----------( 335      ( 26 Sep 2019 08:44 )             32.9     Na(138)/K(3.8)/Cl(96)/HCO3(29)/BUN(20)/Cr(1.50)Glu(121)/Ca(9.5)/Mg(2.2)/PO4(--)    09-26 @ 07:06  Na(139)/K(3.9)/Cl(97)/HCO3(31)/BUN(19)/Cr(1.49)Glu(88)/Ca(9.4)/Mg(2.3)/PO4(3.4)    09-25 @ 07:12  Na(136)/K(4.2)/Cl(96)/HCO3(26)/BUN(20)/Cr(1.47)Glu(128)/Ca(9.7)/Mg(2.3)/PO4(3.7)    09-23 @ 19:14      IMPRESSION: 64M w/ HTN, DM2, CAD, AFib, mild dementia, and CKD3, s/p recent admission for acute on chronic diastolic congestive heart failure, 9/24/19 a/w RSV/CHF flare.    (1)CKD -stage 3; presumed early diabetic nephropathy. He had BARRY from prerenal azotemia during the last admission, but does not appear to have any BARRY at present.    (2)CV - mild CHF flare. Improved with IV Lasix; now on high-dose oral diuretics    RECOMMENDATIONS:  (1)Meds as ordered  (2)No objection to discharge; could f/u at my office in 1-2 months                  Markell Walton MD  [facility]  [phone] No pain, no shortness of breath      VITAL:  T(C): , Max: 36.7 (09-25-19 @ 20:45)  T(F): , Max: 98.1 (09-25-19 @ 20:45)  HR: 81 (09-26-19 @ 12:15)  BP: 127/72 (09-26-19 @ 12:15)  RR: 18 (09-26-19 @ 12:15)  SpO2: 95% (09-26-19 @ 12:15)      PHYSICAL EXAM:    Constitutional: NAD; Alert  HEENT:  NCAT; DMM  Neck: No JVD; supple  Respiratory: CTA-b/l  Cardiac: RRR s1s2  Gastrointestinal: BS+, soft, NT/ND  Urologic: No moreno  Extremities: No peripheral edema  Back: No CVAT b/l    LABS:                        10.2   8.89  )-----------( 335      ( 26 Sep 2019 08:44 )             32.9     Na(138)/K(3.8)/Cl(96)/HCO3(29)/BUN(20)/Cr(1.50)Glu(121)/Ca(9.5)/Mg(2.2)/PO4(--)    09-26 @ 07:06  Na(139)/K(3.9)/Cl(97)/HCO3(31)/BUN(19)/Cr(1.49)Glu(88)/Ca(9.4)/Mg(2.3)/PO4(3.4)    09-25 @ 07:12  Na(136)/K(4.2)/Cl(96)/HCO3(26)/BUN(20)/Cr(1.47)Glu(128)/Ca(9.7)/Mg(2.3)/PO4(3.7)    09-23 @ 19:14      IMPRESSION: 64M w/ HTN, DM2, CAD, AFib, mild dementia, and CKD3, s/p recent admission for acute on chronic diastolic congestive heart failure, 9/24/19 a/w RSV/CHF flare.    (1)CKD -stage 3; presumed early diabetic nephropathy. He had BARRY from prerenal azotemia during the last admission, but does not appear to have any BARRY at present.    (2)CV - mild CHF flare. Improved with IV Lasix; now on high-dose oral diuretics    RECOMMENDATIONS:  (1)Meds as ordered  (2)No objection to discharge; could f/u at my office in 1-2 months            Markell Walton MD  [facility]  [phone]

## 2019-09-26 NOTE — DISCHARGE NOTE NURSING/CASE MANAGEMENT/SOCIAL WORK - NSDCPEPT PROEDHF_GEN_ALL_CORE
Report signs and symptoms to primary care provider/Low salt diet/Monitor weight daily/Call primary care provider for follow up after discharge/Activities as tolerated

## 2019-09-26 NOTE — PROGRESS NOTE ADULT - SUBJECTIVE AND OBJECTIVE BOX
Chief complaint  Patient is a 64y old  Male who presents with a chief complaint of shortness of breath (26 Sep 2019 10:07)   Review of systems  Patient in bed, looks comfortable, no fever, no new hypoglycemia.    Labs and Fingersticks  CAPILLARY BLOOD GLUCOSE      POCT Blood Glucose.: 132 mg/dL (26 Sep 2019 08:25)  POCT Blood Glucose.: 245 mg/dL (25 Sep 2019 21:31)  POCT Blood Glucose.: 129 mg/dL (25 Sep 2019 17:11)  POCT Blood Glucose.: 198 mg/dL (25 Sep 2019 12:06)      Anion Gap, Serum: 13 (09-26 @ 07:06)  Anion Gap, Serum: 11 (09-25 @ 07:12)      Calcium, Total Serum: 9.5 (09-26 @ 07:06)  Calcium, Total Serum: 9.4 (09-25 @ 07:12)  Albumin, Serum: 3.5 (09-26 @ 07:06)  Albumin, Serum: 3.7 (09-25 @ 07:12)    Alanine Aminotransferase (ALT/SGPT): 31 (09-26 @ 07:06)  Alanine Aminotransferase (ALT/SGPT): 33 (09-25 @ 07:12)  Alkaline Phosphatase, Serum: 146 <H> (09-26 @ 07:06)  Alkaline Phosphatase, Serum: 155 <H> (09-25 @ 07:12)  Aspartate Aminotransferase (AST/SGOT): 37 (09-26 @ 07:06)  Aspartate Aminotransferase (AST/SGOT): 37 (09-25 @ 07:12)        09-26    138  |  96  |  20  ----------------------------<  121<H>  3.8   |  29  |  1.50<H>    Ca    9.5      26 Sep 2019 07:06  Phos  3.4     09-25  Mg     2.2     09-26    TPro  6.4  /  Alb  3.5  /  TBili  1.1  /  DBili  x   /  AST  37  /  ALT  31  /  AlkPhos  146<H>  09-26                        10.2   8.89  )-----------( 335      ( 26 Sep 2019 08:44 )             32.9     Medications  MEDICATIONS  (STANDING):  aspirin enteric coated 81 milliGRAM(s) Oral daily  atorvastatin 80 milliGRAM(s) Oral at bedtime  benzonatate 100 milliGRAM(s) Oral three times a day  dextrose 5%. 1000 milliLiter(s) (50 mL/Hr) IV Continuous <Continuous>  dextrose 50% Injectable 12.5 Gram(s) IV Push once  dextrose 50% Injectable 25 Gram(s) IV Push once  dextrose 50% Injectable 25 Gram(s) IV Push once  diltiazem    milliGRAM(s) Oral daily  docusate sodium 100 milliGRAM(s) Oral two times a day  furosemide    Tablet 80 milliGRAM(s) Oral two times a day  insulin glargine Injectable (LANTUS) 16 Unit(s) SubCutaneous at bedtime  insulin lispro (HumaLOG) corrective regimen sliding scale   SubCutaneous three times a day before meals  insulin lispro (HumaLOG) corrective regimen sliding scale   SubCutaneous at bedtime  insulin lispro Injectable (HumaLOG) 5 Unit(s) SubCutaneous three times a day before meals  metolazone 5 milliGRAM(s) Oral daily  metoprolol succinate ER 75 milliGRAM(s) Oral two times a day  rivaroxaban 20 milliGRAM(s) Oral with dinner  senna 2 Tablet(s) Oral at bedtime  tamsulosin 0.4 milliGRAM(s) Oral at bedtime      Physical Exam  General: Patient comfortable in bed  Vital Signs Last 12 Hrs  T(F): 98.1 (09-26-19 @ 04:07), Max: 98.1 (09-26-19 @ 04:07)  HR: 73 (09-26-19 @ 04:07) (73 - 73)  BP: 120/61 (09-26-19 @ 04:07) (120/61 - 120/61)  BP(mean): --  RR: 18 (09-26-19 @ 04:07) (18 - 18)  SpO2: 99% (09-26-19 @ 04:07) (99% - 99%)  Neck: No palpable thyroid nodules.  CVS: S1S2, No murmurs  Respiratory: No wheezing, no crepitations  GI: Abdomen soft, bowel sounds positive  Musculoskeletal:  edema lower extremities.   Skin: No skin rashes, no ecchymosis    Diagnostics

## 2019-09-26 NOTE — DIETITIAN INITIAL EVALUATION ADULT. - PERTINENT LABORATORY DATA
09-26 Na138 mmol/L Glu 121 mg/dL<H> K+ 3.8 mmol/L Cr  1.50 mg/dL<H> BUN 20 mg/dL Phos n/a   Alb 3.5 g/dL PAB n/a       CAPILLARY BLOOD GLUCOSE  POCT Blood Glucose.: 132 mg/dL (26 Sep 2019 08:25)  POCT Blood Glucose.: 245 mg/dL (25 Sep 2019 21:31)  POCT Blood Glucose.: 129 mg/dL (25 Sep 2019 17:11)  POCT Blood Glucose.: 198 mg/dL (25 Sep 2019 12:06)

## 2019-09-26 NOTE — PHARMACOTHERAPY INTERVENTION NOTE - COMMENTS
Counseled patient on discharge medication doses, indications, and possible side effects. Medication cards provided for all medications. Provided patient with medication chart with doses, indications, and times to take. Encouraged use of pill boxes for morning and evening medications to help facilitate adherence and reduce missed doses, patient is agreeable.    Evelyn Chavez, PharmD   [phone] Counseled patient on discharge medication doses, indications, and possible side effects. Medication cards provided for all medications. Provided patient with medication chart with doses, indications, and times to take. Encouraged use of pill boxes for morning and evening medications to help facilitate adherence and reduce missed doses, patient is agreeable. Medication coverage confirmed with pharmacy, furosemide and metolazone covered and in stock at [facility], Xarelto out of stock so sent to [facility] pharmacy, patient aware and will  on discharge.    Evelyn Chavez, PharmD   [phone]

## 2019-09-26 NOTE — DIETITIAN INITIAL EVALUATION ADULT. - PHYSICAL APPEARANCE
other (specify) Ht: 5'6" Wt: 145.2 pounds BMI: 23.5 kg/m2 IBW: 142 pounds (+/-10%) 102%IBW  Edema per nursing flowsheets: 1+ R+L leg  Skin per nursing flowsheets: no pressure injuries noted well nourished/other (specify)

## 2019-09-26 NOTE — DIETITIAN INITIAL EVALUATION ADULT. - PROBLEM SELECTOR PLAN 1
Patient's dyspnea, lower extremity edema and pulmonary crackles and weight gain are suggestive of a heart failure exacerbation. CXR shows new pulmonary effusions.   Monitor on telemetry.  Continue diuresis with lasix 60mg IVP q8h. Trend I&Os.  Follow up electrolytes and replete PRN.   Consult Dr. Moon in AM [phone]

## 2019-09-26 NOTE — DISCHARGE NOTE NURSING/CASE MANAGEMENT/SOCIAL WORK - PATIENT PORTAL LINK FT
You can access the FollowMyHealth Patient Portal offered by [facility] by registering at the following website: [email or web address]. By joining [employer]’s FollowMyHealth portal, you will also be able to view your health information using other applications (apps) compatible with our system.

## 2019-09-26 NOTE — DIETITIAN INITIAL EVALUATION ADULT. - PROBLEM SELECTOR PLAN 7
Start home dose of diltiazem and metoprolol succinate.   Patient also listed as being on amlodipine 5mg PO qd-- unclear why he was on 2 calcium channel blockers. Will hold for now.

## 2019-09-26 NOTE — DIETITIAN INITIAL EVALUATION ADULT. - PERTINENT MEDS FT
lantus, humalog, lasix, zaroxolyn, flomax, colace, senna, tessalon perle, toporol, lipitor, cardizem

## 2019-09-26 NOTE — DIETITIAN INITIAL EVALUATION ADULT. - PROBLEM SELECTOR PLAN 4
Creatinine is elevated from baseline, however has been elevated over the last month.  Will monitor renal function while diuresing.   ACEI was held during previous admission. Continue to hold for now. Will plan to resume when renal function improves.

## 2019-09-26 NOTE — DIETITIAN INITIAL EVALUATION ADULT. - ADD RECOMMEND
1. Continue diet as ordered. 2. Provided pt with extensive Heart Healthy Consistent Carbohydrate/DM Nutrition Therapy education prior to D/C - made pt aware RD to remain available for any additional questions PRN. Literature provided

## 2019-09-26 NOTE — DISCHARGE NOTE PROVIDER - NSDCCAREPROVSEEN_GEN_ALL_CORE_FT
Baudilio Mcdaniel - Internal medicine / Attending   Toro Rivas - Cardiologist  Markell Walton - Nephrologist   Myrna Rossi - Endocrinologist  [facility] Medicine, Advance PracticeTeam

## 2019-09-26 NOTE — DIETITIAN INITIAL EVALUATION ADULT. - OTHER INFO
64M with PMHx of CAD (post-three stents), HTN, HLD, T2DM (complicated by peripheral neuropathy), afib on AC presenting, LLE hematoma now admitted with SOB secondary to acute on chronic diastolic CHF 64M with PMHx of CAD (post-three stents), HTN, HLD, T2DM (complicated by peripheral neuropathy), afib on AC presenting, LLE hematoma now admitted with SOB secondary to acute on chronic diastolic CHF.    Pt had 12 pound weight gain prior to admission 2/2 fluid accumulation, pt now S/P diuresis and endorses he is in his usual weight range.  Pt with good appetite in-house and PTA. notes he tried to eat healthy and follow DM guidelines at home in terms of carbohydrate consumption. Pt with request for diet education prior to D/C - Discussed consistent CHO diet education.  Reviewed foods containing CHO, portion sizes of CHO, CHO counting, pairing CHO with proteins, reading food labels, monitoring blood glucose levels, not skipping meals, reviewed meal/snack options. Also discussed foods high/low in sodium, avoiding processed foods and use of table salt. Discussed nutrition label reading and how to assess labels for sodium content. Literature provided.    Pt notes constipation in-house, on bowel regimen, last BM yesterday 9/25. NKFA. Takes zinc and Mg supplements at home sometimes.

## 2019-09-26 NOTE — DIETITIAN INITIAL EVALUATION ADULT. - PROBLEM SELECTOR PLAN 5
Start home dose of diltiazem and metoprolol for rate control.  Will start xarelto-- based on renal dosing, recommended dose is 20mg PO qd, will start at this dose for now.   Patient was planned for repeat cardiac evaluation on 10/17/2019 for potential cardioversion since last hospitalization found atrial thrombus which precluded potential DCCV.

## 2019-10-03 ENCOUNTER — INPATIENT (INPATIENT)
Facility: HOSPITAL | Age: 64
LOS: 2 days | Discharge: HOME | DRG: 603 | End: 2019-10-06
Attending: GENERAL ACUTE CARE HOSPITAL | Admitting: GENERAL ACUTE CARE HOSPITAL
Payer: COMMERCIAL

## 2019-10-03 VITALS
HEART RATE: 86 BPM | DIASTOLIC BLOOD PRESSURE: 78 MMHG | TEMPERATURE: 98 F | SYSTOLIC BLOOD PRESSURE: 130 MMHG | OXYGEN SATURATION: 100 % | HEIGHT: 66 IN | WEIGHT: 130.95 LBS | RESPIRATION RATE: 16 BRPM

## 2019-10-03 LAB
ALBUMIN SERPL ELPH-MCNC: 4.4 G/DL — SIGNIFICANT CHANGE UP (ref 3.3–5)
ALP SERPL-CCNC: 151 U/L — HIGH (ref 40–120)
ALT FLD-CCNC: 19 U/L — SIGNIFICANT CHANGE UP (ref 10–45)
ANION GAP SERPL CALC-SCNC: 13 MMOL/L — SIGNIFICANT CHANGE UP (ref 5–17)
APTT BLD: 40.5 SEC — HIGH (ref 27.5–36.3)
AST SERPL-CCNC: 24 U/L — SIGNIFICANT CHANGE UP (ref 10–40)
BILIRUB SERPL-MCNC: 1.6 MG/DL — HIGH (ref 0.2–1.2)
BUN SERPL-MCNC: 67 MG/DL — HIGH (ref 7–23)
CALCIUM SERPL-MCNC: 10.1 MG/DL — SIGNIFICANT CHANGE UP (ref 8.4–10.5)
CHLORIDE SERPL-SCNC: 79 MMOL/L — LOW (ref 96–108)
CO2 SERPL-SCNC: 36 MMOL/L — HIGH (ref 22–31)
CREAT SERPL-MCNC: 2.16 MG/DL — HIGH (ref 0.5–1.3)
GLUCOSE SERPL-MCNC: 442 MG/DL — HIGH (ref 70–99)
HCT VFR BLD CALC: 36.3 % — LOW (ref 39–50)
HGB BLD-MCNC: 12.6 G/DL — LOW (ref 13–17)
INR BLD: 3.6 RATIO — HIGH (ref 0.88–1.16)
MCHC RBC-ENTMCNC: 29.4 PG — SIGNIFICANT CHANGE UP (ref 27–34)
MCHC RBC-ENTMCNC: 34.7 GM/DL — SIGNIFICANT CHANGE UP (ref 32–36)
MCV RBC AUTO: 84.6 FL — SIGNIFICANT CHANGE UP (ref 80–100)
PLATELET # BLD AUTO: 407 K/UL — HIGH (ref 150–400)
POTASSIUM SERPL-MCNC: 3 MMOL/L — LOW (ref 3.5–5.3)
POTASSIUM SERPL-SCNC: 3 MMOL/L — LOW (ref 3.5–5.3)
PROT SERPL-MCNC: 7.9 G/DL — SIGNIFICANT CHANGE UP (ref 6–8.3)
PROTHROM AB SERPL-ACNC: 42.7 SEC — HIGH (ref 10–12.9)
RBC # BLD: 4.29 M/UL — SIGNIFICANT CHANGE UP (ref 4.2–5.8)
RBC # FLD: 15.1 % — HIGH (ref 10.3–14.5)
SODIUM SERPL-SCNC: 128 MMOL/L — LOW (ref 135–145)
WBC # BLD: 14.84 K/UL — HIGH (ref 3.8–10.5)
WBC # FLD AUTO: 14.84 K/UL — HIGH (ref 3.8–10.5)

## 2019-10-03 PROCEDURE — 93010 ELECTROCARDIOGRAM REPORT: CPT

## 2019-10-03 PROCEDURE — 99285 EMERGENCY DEPT VISIT HI MDM: CPT

## 2019-10-03 PROCEDURE — 93971 EXTREMITY STUDY: CPT | Mod: 26

## 2019-10-03 RX ORDER — POTASSIUM CHLORIDE 20 MEQ
40 PACKET (EA) ORAL ONCE
Refills: 0 | Status: COMPLETED | OUTPATIENT
Start: 2019-10-03 | End: 2019-10-03

## 2019-10-03 RX ORDER — SODIUM CHLORIDE 9 MG/ML
1000 INJECTION INTRAMUSCULAR; INTRAVENOUS; SUBCUTANEOUS ONCE
Refills: 0 | Status: COMPLETED | OUTPATIENT
Start: 2019-10-03 | End: 2019-10-03

## 2019-10-03 RX ORDER — INSULIN LISPRO 100/ML
5 VIAL (ML) SUBCUTANEOUS ONCE
Refills: 0 | Status: COMPLETED | OUTPATIENT
Start: 2019-10-03 | End: 2019-10-03

## 2019-10-03 RX ADMIN — Medication 5 UNIT(S): at 23:45

## 2019-10-03 RX ADMIN — SODIUM CHLORIDE 1000 MILLILITER(S): 9 INJECTION INTRAMUSCULAR; INTRAVENOUS; SUBCUTANEOUS at 23:27

## 2019-10-03 RX ADMIN — Medication 40 MILLIEQUIVALENT(S): at 23:48

## 2019-10-03 NOTE — ED PROVIDER NOTE - OBJECTIVE STATEMENT
64 year old male with PMH Afib, hypertension, hyperlipidemia, TIIDM, CAD w/ x3 stents on Xarelto presents to ED sent from cardiologist’s office for “irregular EKG” and left foot bruising/pain/swelling. Patient states that on Monday and Tuesday he noticed that his left foot was a little red with swelling and pain, went to his PCP’s office those day and told him to take Tramadol and his Lasix. States that swelling has improved but pain has increased with increased redness and bruising moving up his calf. States that he saw his cardiologist today who said he had a change in his EKG and was unsure what to make of his foot. Denies trauma to the affected area.   Denies current SOB, chest pain, vomiting, headache, numbness, tingling, urinary symptoms, cough, increase extremity swelling.

## 2019-10-03 NOTE — ED PROVIDER NOTE - CARE PLAN
Principal Discharge DX:	Cellulitis  Secondary Diagnosis:	Hyperglycemia  Secondary Diagnosis:	Hypokalemia

## 2019-10-03 NOTE — ED ADULT NURSE NOTE - OBJECTIVE STATEMENT
Ambulatory pt c/o left foot pain and swelling.  Pt with hx of Afib, hypertension, hyperlipidemia, TIIDM, CAD w/ x3 stents on Xarelto sent in from cardiologist’s office for after having “EKG changes” earlier today.  Foot swelling began 3 days ago, left medial ankle and lower leg, warm to touch, not ttp, no open wounds, some ecchymosis to heel, PCP evaluated earlier this week and did not have diagnosis but recommended Tramadol which did not help.  Denies injury to foot or similar issue in past.  Pt is conversive, abd soft/nt/nd, denies cp, sob, abd pain, nvd, fevers, falls. Ambulatory pt c/o left foot pain and swelling.  Pt with hx of Afib, hypertension, hyperlipidemia, TIIDM, CAD w/ x3 stents on Xarelto sent in from cardiologist’s office for after having “EKG changes” earlier today.  Foot swelling began 3 days ago, left medial ankle and lower leg, warm to touch, not ttp, no open wounds, some ecchymosis to heel, PCP evaluated earlier this week and did not have diagnosis but recommended Tramadol which did not help.  Denies injury to foot or similar issue in past.  Pt is conversive, abd soft/nt/nd, denies cp, sob, abd pain, nvd, fevers, falls. 2330 ret from US w/o c/o.

## 2019-10-03 NOTE — ED PROVIDER NOTE - CLINICAL SUMMARY MEDICAL DECISION MAKING FREE TEXT BOX
Liana: sent in from cardiologist for foot bruising, complicated course of anticoagulation use. Good pulses and warm. No S+S of infection. will get DVT US and labs. No pain to palpation.

## 2019-10-03 NOTE — ED PROVIDER NOTE - PHYSICAL EXAMINATION
GEN: Well Appearing, Nontoxic, NAD  HEENT: NC/AT, Symm Facies.   CV:  +S1S2, RRR w/o m/g/r  RESP: CTAB w/o w/r/r  ABD: Soft, nt/nd,   EXT/MSK: No lower extremity edema or calf tenderness. Palpable pulses. FROMx4  SKIN: +bruising to posterior ankle and dorsum of foot. Left foot and distal calf warm to touch, nontender to palpation  Neuro: Grossly intact, AOX3 with normal speech, Sensation intact, motor equal b/l

## 2019-10-03 NOTE — ED PROVIDER NOTE - PMH
Afib    CAD in native artery    Essential hypertension, hypertension with unspecified goal    Former smoker    Hyperlipidemia, unspecified hyperlipidemia type    Type 2 diabetes mellitus

## 2019-10-03 NOTE — ED PROVIDER NOTE - ATTENDING CONTRIBUTION TO CARE
I performed a history and physical exam of the patient and discussed their management with the resident and /or advanced care provider. I reviewed the resident and /or ACP's note and agree with the documented findings and plan of care. My medical decison making and observations are found above.  Legs with good pulses and sensation, +buising +redness Lt

## 2019-10-03 NOTE — ED PROVIDER NOTE - NS ED ROS FT
Constitutional: No fever or chills  Eyes: No visual changes  CV: No chest pain or lower extremity edema  Resp: No SOB no cough  GI: No abd pain. No nausea or vomiting.   : No dysuria, hematuria.   MSK: +left foot/leg pain swelling and bruising   Skin: No rash  Psych: No complaints   Neuro: No headache. No numbness or tingling. No weakness.

## 2019-10-04 DIAGNOSIS — I48.91 UNSPECIFIED ATRIAL FIBRILLATION: ICD-10-CM

## 2019-10-04 DIAGNOSIS — E11.9 TYPE 2 DIABETES MELLITUS WITHOUT COMPLICATIONS: ICD-10-CM

## 2019-10-04 DIAGNOSIS — L03.90 CELLULITIS, UNSPECIFIED: ICD-10-CM

## 2019-10-04 DIAGNOSIS — N17.9 ACUTE KIDNEY FAILURE, UNSPECIFIED: ICD-10-CM

## 2019-10-04 DIAGNOSIS — Z29.9 ENCOUNTER FOR PROPHYLACTIC MEASURES, UNSPECIFIED: ICD-10-CM

## 2019-10-04 DIAGNOSIS — I25.10 ATHEROSCLEROTIC HEART DISEASE OF NATIVE CORONARY ARTERY WITHOUT ANGINA PECTORIS: ICD-10-CM

## 2019-10-04 DIAGNOSIS — E87.8 OTHER DISORDERS OF ELECTROLYTE AND FLUID BALANCE, NOT ELSEWHERE CLASSIFIED: ICD-10-CM

## 2019-10-04 LAB
ALBUMIN SERPL ELPH-MCNC: 4.2 G/DL — SIGNIFICANT CHANGE UP (ref 3.3–5)
ALBUMIN SERPL ELPH-MCNC: 4.2 G/DL — SIGNIFICANT CHANGE UP (ref 3.3–5)
ALP SERPL-CCNC: 137 U/L — HIGH (ref 40–120)
ALP SERPL-CCNC: 139 U/L — HIGH (ref 40–120)
ALT FLD-CCNC: 18 U/L — SIGNIFICANT CHANGE UP (ref 10–45)
ALT FLD-CCNC: 20 U/L — SIGNIFICANT CHANGE UP (ref 10–45)
ANION GAP SERPL CALC-SCNC: 12 MMOL/L — SIGNIFICANT CHANGE UP (ref 5–17)
ANION GAP SERPL CALC-SCNC: 15 MMOL/L — SIGNIFICANT CHANGE UP (ref 5–17)
ANISOCYTOSIS BLD QL: SLIGHT — SIGNIFICANT CHANGE UP
AST SERPL-CCNC: 24 U/L — SIGNIFICANT CHANGE UP (ref 10–40)
AST SERPL-CCNC: 24 U/L — SIGNIFICANT CHANGE UP (ref 10–40)
BASOPHILS # BLD AUTO: 0 K/UL — SIGNIFICANT CHANGE UP (ref 0–0.2)
BASOPHILS # BLD AUTO: 0.05 K/UL — SIGNIFICANT CHANGE UP (ref 0–0.2)
BASOPHILS NFR BLD AUTO: 0 % — SIGNIFICANT CHANGE UP (ref 0–2)
BASOPHILS NFR BLD AUTO: 0.5 % — SIGNIFICANT CHANGE UP (ref 0–2)
BILIRUB SERPL-MCNC: 1.3 MG/DL — HIGH (ref 0.2–1.2)
BILIRUB SERPL-MCNC: 1.4 MG/DL — HIGH (ref 0.2–1.2)
BUN SERPL-MCNC: 53 MG/DL — HIGH (ref 7–23)
BUN SERPL-MCNC: 60 MG/DL — HIGH (ref 7–23)
CALCIUM SERPL-MCNC: 9.7 MG/DL — SIGNIFICANT CHANGE UP (ref 8.4–10.5)
CALCIUM SERPL-MCNC: 9.9 MG/DL — SIGNIFICANT CHANGE UP (ref 8.4–10.5)
CHLORIDE SERPL-SCNC: 85 MMOL/L — LOW (ref 96–108)
CHLORIDE SERPL-SCNC: 87 MMOL/L — LOW (ref 96–108)
CO2 SERPL-SCNC: 31 MMOL/L — SIGNIFICANT CHANGE UP (ref 22–31)
CO2 SERPL-SCNC: 34 MMOL/L — HIGH (ref 22–31)
CREAT SERPL-MCNC: 1.81 MG/DL — HIGH (ref 0.5–1.3)
CREAT SERPL-MCNC: 1.97 MG/DL — HIGH (ref 0.5–1.3)
CRP SERPL-MCNC: 7.95 MG/DL — HIGH (ref 0–0.4)
EOSINOPHIL # BLD AUTO: 0 K/UL — SIGNIFICANT CHANGE UP (ref 0–0.5)
EOSINOPHIL # BLD AUTO: 0.07 K/UL — SIGNIFICANT CHANGE UP (ref 0–0.5)
EOSINOPHIL NFR BLD AUTO: 0 % — SIGNIFICANT CHANGE UP (ref 0–6)
EOSINOPHIL NFR BLD AUTO: 0.7 % — SIGNIFICANT CHANGE UP (ref 0–6)
ERYTHROCYTE [SEDIMENTATION RATE] IN BLOOD: 38 MM/HR — HIGH (ref 0–20)
GLUCOSE BLDC GLUCOMTR-MCNC: 160 MG/DL — HIGH (ref 70–99)
GLUCOSE BLDC GLUCOMTR-MCNC: 246 MG/DL — HIGH (ref 70–99)
GLUCOSE BLDC GLUCOMTR-MCNC: 258 MG/DL — HIGH (ref 70–99)
GLUCOSE BLDC GLUCOMTR-MCNC: 262 MG/DL — HIGH (ref 70–99)
GLUCOSE BLDC GLUCOMTR-MCNC: 272 MG/DL — HIGH (ref 70–99)
GLUCOSE BLDC GLUCOMTR-MCNC: 278 MG/DL — HIGH (ref 70–99)
GLUCOSE SERPL-MCNC: 274 MG/DL — HIGH (ref 70–99)
GLUCOSE SERPL-MCNC: 305 MG/DL — HIGH (ref 70–99)
HCT VFR BLD CALC: 36.3 % — LOW (ref 39–50)
HGB BLD-MCNC: 12 G/DL — LOW (ref 13–17)
IMM GRANULOCYTES NFR BLD AUTO: 0.5 % — SIGNIFICANT CHANGE UP (ref 0–1.5)
LACTATE SERPL-SCNC: 0.9 MMOL/L — SIGNIFICANT CHANGE UP (ref 0.7–2)
LYMPHOCYTES # BLD AUTO: 1.72 K/UL — SIGNIFICANT CHANGE UP (ref 1–3.3)
LYMPHOCYTES # BLD AUTO: 14 % — SIGNIFICANT CHANGE UP (ref 13–44)
LYMPHOCYTES # BLD AUTO: 16.2 % — SIGNIFICANT CHANGE UP (ref 13–44)
LYMPHOCYTES # BLD AUTO: 2.08 K/UL — SIGNIFICANT CHANGE UP (ref 1–3.3)
MAGNESIUM SERPL-MCNC: 2.7 MG/DL — HIGH (ref 1.6–2.6)
MANUAL SMEAR VERIFICATION: SIGNIFICANT CHANGE UP
MCHC RBC-ENTMCNC: 28.5 PG — SIGNIFICANT CHANGE UP (ref 27–34)
MCHC RBC-ENTMCNC: 33.1 GM/DL — SIGNIFICANT CHANGE UP (ref 32–36)
MCV RBC AUTO: 86.2 FL — SIGNIFICANT CHANGE UP (ref 80–100)
MICROCYTES BLD QL: SLIGHT — SIGNIFICANT CHANGE UP
MONOCYTES # BLD AUTO: 1.21 K/UL — HIGH (ref 0–0.9)
MONOCYTES # BLD AUTO: 1.44 K/UL — HIGH (ref 0–0.9)
MONOCYTES NFR BLD AUTO: 11.4 % — SIGNIFICANT CHANGE UP (ref 2–14)
MONOCYTES NFR BLD AUTO: 9.7 % — SIGNIFICANT CHANGE UP (ref 2–14)
NEUTROPHILS # BLD AUTO: 11.32 K/UL — HIGH (ref 1.8–7.4)
NEUTROPHILS # BLD AUTO: 7.54 K/UL — HIGH (ref 1.8–7.4)
NEUTROPHILS NFR BLD AUTO: 70.7 % — SIGNIFICANT CHANGE UP (ref 43–77)
NEUTROPHILS NFR BLD AUTO: 76.3 % — SIGNIFICANT CHANGE UP (ref 43–77)
NRBC # BLD: 1 /100 — HIGH (ref 0–0)
PHOSPHATE SERPL-MCNC: 3 MG/DL — SIGNIFICANT CHANGE UP (ref 2.5–4.5)
PLAT MORPH BLD: NORMAL — SIGNIFICANT CHANGE UP
PLATELET # BLD AUTO: 367 K/UL — SIGNIFICANT CHANGE UP (ref 150–400)
POIKILOCYTOSIS BLD QL AUTO: SLIGHT — SIGNIFICANT CHANGE UP
POTASSIUM SERPL-MCNC: 3.2 MMOL/L — LOW (ref 3.5–5.3)
POTASSIUM SERPL-MCNC: 3.3 MMOL/L — LOW (ref 3.5–5.3)
POTASSIUM SERPL-SCNC: 3.2 MMOL/L — LOW (ref 3.5–5.3)
POTASSIUM SERPL-SCNC: 3.3 MMOL/L — LOW (ref 3.5–5.3)
PROCALCITONIN SERPL-MCNC: 0.07 NG/ML — SIGNIFICANT CHANGE UP (ref 0.02–0.1)
PROT SERPL-MCNC: 7.4 G/DL — SIGNIFICANT CHANGE UP (ref 6–8.3)
PROT SERPL-MCNC: 7.5 G/DL — SIGNIFICANT CHANGE UP (ref 6–8.3)
RBC # BLD: 4.21 M/UL — SIGNIFICANT CHANGE UP (ref 4.2–5.8)
RBC # FLD: 15.2 % — HIGH (ref 10.3–14.5)
RBC BLD AUTO: SIGNIFICANT CHANGE UP
SODIUM SERPL-SCNC: 131 MMOL/L — LOW (ref 135–145)
SODIUM SERPL-SCNC: 133 MMOL/L — LOW (ref 135–145)
URATE SERPL-MCNC: 10.7 MG/DL — HIGH (ref 3.4–8.8)
WBC # BLD: 10.64 K/UL — HIGH (ref 3.8–10.5)
WBC # FLD AUTO: 10.64 K/UL — HIGH (ref 3.8–10.5)

## 2019-10-04 PROCEDURE — 99223 1ST HOSP IP/OBS HIGH 75: CPT

## 2019-10-04 PROCEDURE — 71045 X-RAY EXAM CHEST 1 VIEW: CPT | Mod: 26

## 2019-10-04 RX ORDER — TAMSULOSIN HYDROCHLORIDE 0.4 MG/1
0.4 CAPSULE ORAL AT BEDTIME
Refills: 0 | Status: DISCONTINUED | OUTPATIENT
Start: 2019-10-04 | End: 2019-10-06

## 2019-10-04 RX ORDER — POTASSIUM CHLORIDE 20 MEQ
40 PACKET (EA) ORAL ONCE
Refills: 0 | Status: COMPLETED | OUTPATIENT
Start: 2019-10-04 | End: 2019-10-04

## 2019-10-04 RX ORDER — METOPROLOL TARTRATE 50 MG
75 TABLET ORAL
Refills: 0 | Status: DISCONTINUED | OUTPATIENT
Start: 2019-10-04 | End: 2019-10-06

## 2019-10-04 RX ORDER — INSULIN LISPRO 100/ML
7 VIAL (ML) SUBCUTANEOUS
Qty: 0 | Refills: 0 | DISCHARGE

## 2019-10-04 RX ORDER — INSULIN LISPRO 100/ML
VIAL (ML) SUBCUTANEOUS AT BEDTIME
Refills: 0 | Status: DISCONTINUED | OUTPATIENT
Start: 2019-10-04 | End: 2019-10-06

## 2019-10-04 RX ORDER — COLCHICINE 0.6 MG
0.3 TABLET ORAL DAILY
Refills: 0 | Status: DISCONTINUED | OUTPATIENT
Start: 2019-10-05 | End: 2019-10-05

## 2019-10-04 RX ORDER — DEXTROSE 50 % IN WATER 50 %
25 SYRINGE (ML) INTRAVENOUS ONCE
Refills: 0 | Status: DISCONTINUED | OUTPATIENT
Start: 2019-10-04 | End: 2019-10-06

## 2019-10-04 RX ORDER — DEXTROSE MONOHYDRATE, SODIUM CHLORIDE, AND POTASSIUM CHLORIDE 50; .745; 4.5 G/1000ML; G/1000ML; G/1000ML
1000 INJECTION, SOLUTION INTRAVENOUS
Refills: 0 | Status: DISCONTINUED | OUTPATIENT
Start: 2019-10-04 | End: 2019-10-05

## 2019-10-04 RX ORDER — SENNA PLUS 8.6 MG/1
2 TABLET ORAL AT BEDTIME
Refills: 0 | Status: DISCONTINUED | OUTPATIENT
Start: 2019-10-04 | End: 2019-10-06

## 2019-10-04 RX ORDER — RIVAROXABAN 15 MG-20MG
15 KIT ORAL
Refills: 0 | Status: DISCONTINUED | OUTPATIENT
Start: 2019-10-04 | End: 2019-10-06

## 2019-10-04 RX ORDER — ATORVASTATIN CALCIUM 80 MG/1
80 TABLET, FILM COATED ORAL AT BEDTIME
Refills: 0 | Status: DISCONTINUED | OUTPATIENT
Start: 2019-10-04 | End: 2019-10-06

## 2019-10-04 RX ORDER — INSULIN GLARGINE 100 [IU]/ML
10 INJECTION, SOLUTION SUBCUTANEOUS ONCE
Refills: 0 | Status: COMPLETED | OUTPATIENT
Start: 2019-10-04 | End: 2019-10-04

## 2019-10-04 RX ORDER — AMPICILLIN SODIUM AND SULBACTAM SODIUM 250; 125 MG/ML; MG/ML
1.5 INJECTION, POWDER, FOR SUSPENSION INTRAMUSCULAR; INTRAVENOUS EVERY 6 HOURS
Refills: 0 | Status: DISCONTINUED | OUTPATIENT
Start: 2019-10-04 | End: 2019-10-04

## 2019-10-04 RX ORDER — DILTIAZEM HCL 120 MG
180 CAPSULE, EXT RELEASE 24 HR ORAL DAILY
Refills: 0 | Status: DISCONTINUED | OUTPATIENT
Start: 2019-10-04 | End: 2019-10-06

## 2019-10-04 RX ORDER — INSULIN LISPRO 100/ML
7 VIAL (ML) SUBCUTANEOUS
Refills: 0 | Status: DISCONTINUED | OUTPATIENT
Start: 2019-10-04 | End: 2019-10-06

## 2019-10-04 RX ORDER — CEPHALEXIN 500 MG
500 CAPSULE ORAL ONCE
Refills: 0 | Status: DISCONTINUED | OUTPATIENT
Start: 2019-10-04 | End: 2019-10-04

## 2019-10-04 RX ORDER — DEXTROSE 50 % IN WATER 50 %
12.5 SYRINGE (ML) INTRAVENOUS ONCE
Refills: 0 | Status: DISCONTINUED | OUTPATIENT
Start: 2019-10-04 | End: 2019-10-06

## 2019-10-04 RX ORDER — INSULIN LISPRO 100/ML
VIAL (ML) SUBCUTANEOUS
Refills: 0 | Status: DISCONTINUED | OUTPATIENT
Start: 2019-10-04 | End: 2019-10-06

## 2019-10-04 RX ORDER — GLUCAGON INJECTION, SOLUTION 0.5 MG/.1ML
1 INJECTION, SOLUTION SUBCUTANEOUS ONCE
Refills: 0 | Status: DISCONTINUED | OUTPATIENT
Start: 2019-10-04 | End: 2019-10-06

## 2019-10-04 RX ORDER — DEXTROSE 50 % IN WATER 50 %
15 SYRINGE (ML) INTRAVENOUS ONCE
Refills: 0 | Status: DISCONTINUED | OUTPATIENT
Start: 2019-10-04 | End: 2019-10-06

## 2019-10-04 RX ORDER — AMPICILLIN SODIUM AND SULBACTAM SODIUM 250; 125 MG/ML; MG/ML
1.5 INJECTION, POWDER, FOR SUSPENSION INTRAMUSCULAR; INTRAVENOUS ONCE
Refills: 0 | Status: COMPLETED | OUTPATIENT
Start: 2019-10-04 | End: 2019-10-04

## 2019-10-04 RX ORDER — AMPICILLIN SODIUM AND SULBACTAM SODIUM 250; 125 MG/ML; MG/ML
INJECTION, POWDER, FOR SUSPENSION INTRAMUSCULAR; INTRAVENOUS
Refills: 0 | Status: DISCONTINUED | OUTPATIENT
Start: 2019-10-04 | End: 2019-10-04

## 2019-10-04 RX ORDER — COLCHICINE 0.6 MG
0.6 TABLET ORAL ONCE
Refills: 0 | Status: COMPLETED | OUTPATIENT
Start: 2019-10-04 | End: 2019-10-04

## 2019-10-04 RX ORDER — SODIUM CHLORIDE 9 MG/ML
1000 INJECTION, SOLUTION INTRAVENOUS
Refills: 0 | Status: DISCONTINUED | OUTPATIENT
Start: 2019-10-04 | End: 2019-10-06

## 2019-10-04 RX ORDER — DOCUSATE SODIUM 100 MG
100 CAPSULE ORAL
Refills: 0 | Status: DISCONTINUED | OUTPATIENT
Start: 2019-10-04 | End: 2019-10-06

## 2019-10-04 RX ORDER — ASPIRIN/CALCIUM CARB/MAGNESIUM 324 MG
81 TABLET ORAL DAILY
Refills: 0 | Status: DISCONTINUED | OUTPATIENT
Start: 2019-10-04 | End: 2019-10-06

## 2019-10-04 RX ORDER — INSULIN GLARGINE 100 [IU]/ML
16 INJECTION, SOLUTION SUBCUTANEOUS AT BEDTIME
Refills: 0 | Status: DISCONTINUED | OUTPATIENT
Start: 2019-10-04 | End: 2019-10-06

## 2019-10-04 RX ORDER — CEFAZOLIN SODIUM 1 G
1000 VIAL (EA) INJECTION EVERY 8 HOURS
Refills: 0 | Status: DISCONTINUED | OUTPATIENT
Start: 2019-10-04 | End: 2019-10-06

## 2019-10-04 RX ADMIN — AMPICILLIN SODIUM AND SULBACTAM SODIUM 100 GRAM(S): 250; 125 INJECTION, POWDER, FOR SUSPENSION INTRAMUSCULAR; INTRAVENOUS at 01:30

## 2019-10-04 RX ADMIN — Medication 100 MILLIGRAM(S): at 21:13

## 2019-10-04 RX ADMIN — INSULIN GLARGINE 10 UNIT(S): 100 INJECTION, SOLUTION SUBCUTANEOUS at 04:05

## 2019-10-04 RX ADMIN — Medication 7 UNIT(S): at 13:13

## 2019-10-04 RX ADMIN — AMPICILLIN SODIUM AND SULBACTAM SODIUM 100 GRAM(S): 250; 125 INJECTION, POWDER, FOR SUSPENSION INTRAMUSCULAR; INTRAVENOUS at 13:15

## 2019-10-04 RX ADMIN — Medication 75 MILLIGRAM(S): at 18:28

## 2019-10-04 RX ADMIN — Medication 6: at 09:11

## 2019-10-04 RX ADMIN — Medication 81 MILLIGRAM(S): at 13:15

## 2019-10-04 RX ADMIN — Medication 180 MILLIGRAM(S): at 05:30

## 2019-10-04 RX ADMIN — TAMSULOSIN HYDROCHLORIDE 0.4 MILLIGRAM(S): 0.4 CAPSULE ORAL at 21:14

## 2019-10-04 RX ADMIN — Medication 0.6 MILLIGRAM(S): at 13:41

## 2019-10-04 RX ADMIN — Medication 75 MILLIGRAM(S): at 05:30

## 2019-10-04 RX ADMIN — Medication 100 MILLIGRAM(S): at 18:28

## 2019-10-04 RX ADMIN — AMPICILLIN SODIUM AND SULBACTAM SODIUM 100 GRAM(S): 250; 125 INJECTION, POWDER, FOR SUSPENSION INTRAMUSCULAR; INTRAVENOUS at 05:30

## 2019-10-04 RX ADMIN — INSULIN GLARGINE 16 UNIT(S): 100 INJECTION, SOLUTION SUBCUTANEOUS at 22:42

## 2019-10-04 RX ADMIN — Medication 6: at 13:13

## 2019-10-04 RX ADMIN — Medication 100 MILLIGRAM(S): at 15:58

## 2019-10-04 RX ADMIN — SENNA PLUS 2 TABLET(S): 8.6 TABLET ORAL at 21:15

## 2019-10-04 RX ADMIN — ATORVASTATIN CALCIUM 80 MILLIGRAM(S): 80 TABLET, FILM COATED ORAL at 21:14

## 2019-10-04 RX ADMIN — RIVAROXABAN 15 MILLIGRAM(S): KIT at 18:28

## 2019-10-04 RX ADMIN — Medication 7 UNIT(S): at 09:12

## 2019-10-04 RX ADMIN — DEXTROSE MONOHYDRATE, SODIUM CHLORIDE, AND POTASSIUM CHLORIDE 50 MILLILITER(S): 50; .745; 4.5 INJECTION, SOLUTION INTRAVENOUS at 18:40

## 2019-10-04 RX ADMIN — Medication 100 MILLIGRAM(S): at 05:30

## 2019-10-04 RX ADMIN — Medication 40 MILLIEQUIVALENT(S): at 05:30

## 2019-10-04 RX ADMIN — Medication 2: at 18:30

## 2019-10-04 RX ADMIN — DEXTROSE MONOHYDRATE, SODIUM CHLORIDE, AND POTASSIUM CHLORIDE 50 MILLILITER(S): 50; .745; 4.5 INJECTION, SOLUTION INTRAVENOUS at 13:41

## 2019-10-04 RX ADMIN — Medication 7 UNIT(S): at 18:31

## 2019-10-04 NOTE — H&P ADULT - NSHPLABSRESULTS_GEN_ALL_CORE
Labs personally reviewed:                          12.6   14.84 )-----------( 407      ( 03 Oct 2019 22:16 )             36.3     10-04    131<L>  |  85<L>  |  60<H>  ----------------------------<  305<H>  3.3<L>   |  34<H>  |  1.97<H>    Ca    9.9      04 Oct 2019 01:39    TPro  7.4  /  Alb  4.2  /  TBili  1.3<H>  /  DBili  x   /  AST  24  /  ALT  20  /  AlkPhos  139<H>  10-04        LIVER FUNCTIONS - ( 04 Oct 2019 01:39 )  Alb: 4.2 g/dL / Pro: 7.4 g/dL / ALK PHOS: 139 U/L / ALT: 20 U/L / AST: 24 U/L / GGT: x           PT/INR - ( 03 Oct 2019 22:16 )   PT: 42.7 sec;   INR: 3.60 ratio         PTT - ( 03 Oct 2019 22:16 )  PTT:40.5 sec    CAPILLARY BLOOD GLUCOSE      POCT Blood Glucose.: 320 mg/dL (04 Oct 2019 00:29)      Imaging:  CXR personally reviewed: no focal opacity  Venous duplex: negative for DVT    EKG personally reviewed: afib, ST depression, I, II, III, AVF, V3-V6  EKG 9/24/19 AFib, ST depression II, AVF, V4-V6 Labs personally reviewed:                          12.6   14.84 )-----------( 407      ( 03 Oct 2019 22:16 )             36.3     10-04    131<L>  |  85<L>  |  60<H>  ----------------------------<  305<H>  3.3<L>   |  34<H>  |  1.97<H>    Ca    9.9      04 Oct 2019 01:39    TPro  7.4  /  Alb  4.2  /  TBili  1.3<H>  /  DBili  x   /  AST  24  /  ALT  20  /  AlkPhos  139<H>  10-04        LIVER FUNCTIONS - ( 04 Oct 2019 01:39 )  Alb: 4.2 g/dL / Pro: 7.4 g/dL / ALK PHOS: 139 U/L / ALT: 20 U/L / AST: 24 U/L / GGT: x           PT/INR - ( 03 Oct 2019 22:16 )   PT: 42.7 sec;   INR: 3.60 ratio         PTT - ( 03 Oct 2019 22:16 )  PTT:40.5 sec    CAPILLARY BLOOD GLUCOSE      POCT Blood Glucose.: 320 mg/dL (04 Oct 2019 00:29)      Imaging:  CXR ordered  Venous duplex: negative for DVT    EKG personally reviewed: afib, ST depression, I, II, III, AVF, V3-V6  EKG 9/24/19 AFib, ST depression II, AVF, V4-V6

## 2019-10-04 NOTE — H&P ADULT - NSHPPHYSICALEXAM_GEN_ALL_CORE
PHYSICAL EXAM:  Vital Signs Last 24 Hrs  T(C): 36.7 (10-04-19 @ 02:07)  T(F): 98.1 (10-04-19 @ 02:07), Max: 98.1 (10-04-19 @ 02:07)  HR: 80 (10-04-19 @ 02:07) (80 - 86)  BP: 133/77 (10-04-19 @ 02:07)  BP(mean): --  RR: 18 (10-04-19 @ 02:07) (16 - 18)  SpO2: 100% (10-04-19 @ 02:07) (99% - 100%)  Wt(kg): --    10-03 @ 07:01  -  10-04 @ 03:22  --------------------------------------------------------  IN: 1000 mL / OUT: 0 mL / NET: 1000 mL      Constitutional: NAD, awake and alert  EYES: EOMI  ENT:  Normal Hearing, no tonsillar exudates   Neck: Soft and supple, No JVD  Lungs: Breath sounds are clear bilaterally, No wheezing, rales or rhonchi  Heart: S1 and S2, irregular, no Murmurs, gallops or rubs  Abdomen: Bowel Sounds present, soft, nontender, nondistended, no guarding, no rebound  Extremities: left hill/ankle redness and warmth, +redness and warmth also dorsum of left foot; no significant swelling  Vascular: 2+ peripheral pulses lower ex  Neurological: A/O x 3, no focal deficits  Musculoskeletal: 5/5 strength b/l upper and lower extremities  Skin: No rashes  Psych: no depression or anhedonia  HEME: no bruises, no nose bleeds

## 2019-10-04 NOTE — H&P ADULT - NSICDXPASTMEDICALHX_GEN_ALL_CORE_FT
PAST MEDICAL HISTORY:  Afib     CAD in native artery     Essential hypertension, hypertension with unspecified goal     Former smoker     Hematoma of leg     Hyperlipidemia, unspecified hyperlipidemia type     Type 2 diabetes mellitus

## 2019-10-04 NOTE — H&P ADULT - PROBLEM SELECTOR PLAN 6
c/w atorvastatin, aspirin, metoprolol c/w atorvastatin, aspirin, metoprolol  EKG shows ST depression lead II, III, AVF, V3-V6 also seen outpatient office and similar to previous EKG on 09/26/19  will check EKG again;   currently no chest pain

## 2019-10-04 NOTE — H&P ADULT - ASSESSMENT
63 yo male with PMH of Afib on xarelto, HTN, HLD, T2DM, diastolic CHF, CAD s/p stents, LLE hematoma sent here from cardiologist's office for irregular EKG and left foot pain/swelling.

## 2019-10-04 NOTE — CONSULT NOTE ADULT - SUBJECTIVE AND OBJECTIVE BOX
HPI:  Mr. Velázquez is a 64 year-old man with history of multiple medical issues including hypertension, type 2 diabetes mellitus, atrial fibrillation, coronary artery disease, and diastolic congestive heart failure. He is s/p multiple admissions over the past several weeks for decompensated CHF; he developed hyponatremia and acute kidney injury as well due to the CHF flares. He returned overnight to the [facility] ER after being noted at his cardiologist's office to have an irregular EKG and to have left foot pain and swelling, as well as dizziness exacerbated by standing. On last admission, he was discharged on high dose Lasix and on Metolazone; the Metolazone was discontinued a few days ago; the Lasix was continued.    In the ER, he has received NS 1L, and KCl 40meq po x 2    He was noted in the ER to have BARRY, with a BUN/creatinine of 67/2.16. Therefore, a renal consultation was requested.      PAST MEDICAL & SURGICAL HISTORY:  Afib  Essential hypertension, hypertension with unspecified goal  Hyperlipidemia, unspecified hyperlipidemia type  Type 2 diabetes mellitus  CAD in native artery  s/p Carotid Endarterectomy: left  Hypervolemic hyponatremia  Diastolic CHF    Allergies  None    SOCIAL HISTORY:  (+)former smorker    FAMILY HISTORY:  FH: atrial fibrillation: sister  Family history of heart disease: father  age 71    REVIEW OF SYSTEMS:  CONSTITUTIONAL: No weakness, fevers or chills  EYES/ENT: No visual changes;  No vertigo or throat pain   NECK: No pain or stiffness  RESPIRATORY: No cough, wheezing, hemoptysis; No shortness of breath  CARDIOVASCULAR: No chest pain or palpitations; (+)dizziness  GASTROINTESTINAL: No abdominal or epigastric pain. No nausea, vomiting, or hematemesis; No diarrhea or constipation. No melena or hematochezia.  GENITOURINARY: No dysuria, frequency or hematuria  NEUROLOGICAL: No numbness or weakness; (+)left foot pain  SKIN: No itching, burning, rashes, or lesions; (+)left foot swelling/redness  All other review of systems is negative unless indicated above.    VITAL:  T(C): , Max: 37 (10-04-19 @ 08:12)  T(F): , Max: 98.6 (10-04-19 @ 08:12)  HR: 97 (10-04-19 @ 08:12)  BP: 90/69 (10-04-19 @ 08:12)  RR: 18 (10-04-19 @ 08:12)  SpO2: 98% (10-04-19 @ 08:12)    PHYSICAL EXAM:  Constitutional: NAD, Alert  HEENT: NCAT, MMM  Neck: Supple, No JVD  Respiratory: CTA-b/l  Cardiovascular: RRR s1s2, no m/r/g  Gastrointestinal: BS+, soft, NT/ND  Extremities: No peripheral edema b/l  Neurological: no focal deficits; strength grossly intact  Back: no CVAT b/l  Skin: No rashes, no nevi    LABS:                        12.0   10.64 )-----------( 367      ( 04 Oct 2019 08:26 )             36.3     Na(133)/K(3.2)/Cl(87)/HCO3(31)/BUN(53)/Cr(1.81)Glu(274)/Ca(9.7)/Mg(2.7)/PO4(3.0)    10 @ 05:48  Na(131)/K(3.3)/Cl(85)/HCO3(34)/BUN(60)/Cr(1.97)Glu(305)/Ca(9.9)/Mg(--)/PO4(--)    10-04 @ 01:39  Na(128)/K(3.0)/Cl(79)/HCO3(36)/BUN(67)/Cr(2.16)Glu(442)/Ca(10.1)/Mg(--)/PO4(--)    10-03 @ 22:16    (19)- BUN 20, Cr 1.50, K 3.8, Na 138      IMAGING:  < from: Xray Chest 2 Views PA/Lat (19 @ 21:50) >  Bilateral trace pleural effusions, new since 2019.    ASSESSMENT:  (1)Renal - CKD G3A1, with GFR ~40-50ml/min at baseline. Superimposed prerenally mediated BARRY from overdiuresis.   (2)Hypokalemia - diuretic-induced whole-body deficit - being repleted (s/p 40meq KCL PO x 2)  (3)Hyponatremia - mild - hypovolemic - improving with volume repletion  (4)Metabolic alkalosis - due to contraction  (5)CV- fragile volume status/difficult to establish euvolemia  (6)LLE pain/swelling - gout    RECOMMEND:  (1)NS+20meq/L KCl @ 50cc/h x 1L  (2)Check a uric acid level (I will call lab for ADD-ON)  (3)BMP daily  (4)Colchicine 0.6mg po x 1 and 0.3mg po daily afterwards  (5)No NSAIDs for now  (6)Dose new meds for GFR 25-30ml/min    Thank you for involving [facility] in this patient's care.    With warm regards,    Markell Walton MD   [facility]  [phone] HPI:  Mr. Velázquez is a 64 year-old man with history of multiple medical issues including hypertension, type 2 diabetes mellitus, atrial fibrillation, coronary artery disease, and diastolic congestive heart failure. He is s/p multiple admissions over the past several weeks for decompensated CHF; he developed hyponatremia and acute kidney injury as well due to the CHF flares. He returned overnight to the [facility] ER after being noted at his cardiologist's office to have an irregular EKG and to have left foot pain and swelling, as well as dizziness exacerbated by standing. On last admission, he was discharged on high dose Lasix and on Metolazone; the Metolazone was discontinued a few days ago; the Lasix was continued.    In the ER, he has received NS 1L, and KCl 40meq po x 2    He was noted in the ER to have BARRY, with a BUN/creatinine of 67/2.16. Therefore, a renal consultation was requested.    Mr. Velázquez tells me that he checked his weight within the past few days and it was down to 131lb; it is usually 139lb. He has a scale at home and occasionally checks his weight...    PAST MEDICAL & SURGICAL HISTORY:  Afib  Essential hypertension, hypertension with unspecified goal  Hyperlipidemia, unspecified hyperlipidemia type  Type 2 diabetes mellitus  CAD in native artery  s/p Carotid Endarterectomy: left  Hypervolemic hyponatremia  Diastolic CHF    Allergies  None    SOCIAL HISTORY:  (+)former smorker    FAMILY HISTORY:  FH: atrial fibrillation: sister  Family history of heart disease: father  age 71    REVIEW OF SYSTEMS:  CONSTITUTIONAL: No weakness, fevers or chills  EYES/ENT: No visual changes;  No vertigo or throat pain   NECK: No pain or stiffness  RESPIRATORY: No cough, wheezing, hemoptysis; No shortness of breath  CARDIOVASCULAR: No chest pain or palpitations; (+)dizziness  GASTROINTESTINAL: No abdominal or epigastric pain. No nausea, vomiting, or hematemesis; No diarrhea or constipation. No melena or hematochezia.  GENITOURINARY: No dysuria, frequency or hematuria  NEUROLOGICAL: No numbness or weakness; (+)left foot pain  SKIN: No itching, burning, rashes, or lesions; (+)left foot swelling/redness  All other review of systems is negative unless indicated above.    VITAL:  T(C): , Max: 37 (10-04-19 @ 08:12)  T(F): , Max: 98.6 (10-04-19 @ 08:12)  HR: 97 (10-04-19 @ 08:12)  BP: 90/69 (10-04-19 @ 08:12)  RR: 18 (10-04-19 @ 08:12)  SpO2: 98% (10-04-19 @ 08:12)    PHYSICAL EXAM:  Constitutional: NAD, Alert  HEENT: NCAT, MMM  Neck: Supple, No JVD  Respiratory: CTA-b/l  Cardiovascular: RRR s1s2, no m/r/g  Gastrointestinal: BS+, soft, NT/ND  Extremities: 1+ LLE edema; (+)scattered areas of erythema and warmth  Neurological: no focal deficits; strength grossly intact  Back: no CVAT b/l  Skin: No rashes, no nevi    LABS:                        12.0   10.64 )-----------( 367      ( 04 Oct 2019 08:26 )             36.3     Na(133)/K(3.2)/Cl(87)/HCO3(31)/BUN(53)/Cr(1.81)Glu(274)/Ca(9.7)/Mg(2.7)/PO4(3.0)    10 @ 05:48  Na(131)/K(3.3)/Cl(85)/HCO3(34)/BUN(60)/Cr(1.97)Glu(305)/Ca(9.9)/Mg(--)/PO4(--)    10-04 @ 01:39  Na(128)/K(3.0)/Cl(79)/HCO3(36)/BUN(67)/Cr(2.16)Glu(442)/Ca(10.1)/Mg(--)/PO4(--)    10-03 @ 22:16    (19)- BUN 20, Cr 1.50, K 3.8, Na 138      IMAGING:  < from: Xray Chest 2 Views PA/Lat (19 @ 21:50) >  Bilateral trace pleural effusions, new since 2019.    ASSESSMENT:  (1)Renal - CKD G3A1, with GFR ~40-50ml/min at baseline. Superimposed prerenally mediated BARRY from overdiuresis.   (2)Hypokalemia - diuretic-induced whole-body deficit - being repleted (s/p 40meq KCL PO x 2)  (3)Hyponatremia - mild - hypovolemic - improving with volume repletion  (4)Metabolic alkalosis - due to contraction  (5)CV- fragile volume status/difficult to establish euvolemia  (6)LLE pain/swelling - gout    RECOMMEND:  (1)NS+20meq/L KCl @ 50cc/h x 1L  (2)Check a uric acid level (I will call lab for ADD-ON)  (3)BMP daily  (4)Colchicine 0.6mg po x 1 and 0.3mg po daily afterwards  (5)No NSAIDs for now  (6)Dose new meds for GFR 25-30ml/min  (7)Upon discharge, would keep him on Lasix 80mg po qd (no metolazone) but would have him hold the Lasix for weight of 133lb or lower    Thank you for involving [facility] in this patient's care.    With warm regards,    Markell Walton MD   [facility]  [phone]

## 2019-10-04 NOTE — H&P ADULT - PROBLEM SELECTOR PLAN 3
Likely due to overdiuresis  s/p potassium repletion, repeat BMP shows potassium 3.3; will order Kcl again  sodium now normal corrected for elevated blood glucose  hold further diuretics at this time Likely due to overdiuresis  s/p potassium repletion, repeat BMP shows potassium 3.3; will order Kcl again  sodium now normal corrected for elevated blood glucose  hold further diuretics at this time  repeat EKG

## 2019-10-04 NOTE — H&P ADULT - NSHPREVIEWOFSYSTEMS_GEN_ALL_CORE
CONSTITUTIONAL: +lethargic, lightheadedness  EYES/ENT: No visual changes;  No dysphagia  NECK: No pain or stiffness  RESPIRATORY: No cough, wheezing, hemoptysis; No shortness of breath  CARDIOVASCULAR: No chest pain or palpitations; No lower extremity edema  EXTREMITIES: Left heel/ankle pain  MUSCULOSKELETAL: no joint pain, swelling  GASTROINTESTINAL: No abdominal or epigastric pain. No nausea, vomiting, or hematemesis; No diarrhea or constipation. No melena or hematochezia.  BACK: No back pain  GENITOURINARY: No dysuria, frequency or hematuria  NEUROLOGICAL: No numbness or weakness  SKIN: No itching, burning, rashes, or lesions   PSYCH: no agitation  All other review of systems is negative unless indicated above.

## 2019-10-04 NOTE — H&P ADULT - PROBLEM SELECTOR PLAN 5
sliding scale  home regimen lantus 20u at bedtime, humalog 7u tidac  missed lantus last night; will give stat 10u; restart home regimen sliding scale  home regimen lantus 20u at bedtime, humalog 7u tidac  missed lantus last night; will give stat 10u; restart lantus at 16u (was getting lantus 16u last admission) bedtime and humalog 7u tidac

## 2019-10-04 NOTE — H&P ADULT - PROBLEM SELECTOR PLAN 2
Likely 2/2 to overdiuresis  s/p 1L NS; will monitor BMP closely  monitor electrolytes  check US kidneys

## 2019-10-04 NOTE — CONSULT NOTE ADULT - SUBJECTIVE AND OBJECTIVE BOX
HPI:   Patient is a 64y male with dm, chr, afib. He had a couple of recent hospital stays , one for cellulitis of the right foot and hematoma of the left thigh then one for chf. He reports that over the past 3 days he had swelling and redness of the left foot and leg. He was given lasix and the swelling got better but the redness got worse over the leg and his heal area looked black and blue He denies specific trauma but it was hard to get his shoe on when the foot was more swollen. He has no dog or cat or water exposure. He has not stepped on anything. He is not with fever, chills, nausea, vomiting. His sugars are a bit out of control.     REVIEW OF SYSTEMS:  All other review of systems negative (Comprehensive ROS)    PAST MEDICAL & SURGICAL HISTORY:  Hematoma of leg  Afib  Essential hypertension, hypertension with unspecified goal  Hyperlipidemia, unspecified hyperlipidemia type  Type 2 diabetes mellitus  CAD in native artery  Former smoker  s/p Carotid Endarterectomy: left      Allergies    No Known Allergies    Intolerances        Antimicrobials Day #  :2  ampicillin/sulbactam  IVPB      ampicillin/sulbactam  IVPB 1.5 Gram(s) IV Intermittent every 6 hours    Other Medications:  aspirin enteric coated 81 milliGRAM(s) Oral daily  atorvastatin 80 milliGRAM(s) Oral at bedtime  dextrose 40% Gel 15 Gram(s) Oral once PRN  dextrose 5%. 1000 milliLiter(s) IV Continuous <Continuous>  dextrose 50% Injectable 12.5 Gram(s) IV Push once  dextrose 50% Injectable 25 Gram(s) IV Push once  dextrose 50% Injectable 25 Gram(s) IV Push once  diltiazem    milliGRAM(s) Oral daily  docusate sodium 100 milliGRAM(s) Oral two times a day  glucagon  Injectable 1 milliGRAM(s) IntraMuscular once PRN  insulin glargine Injectable (LANTUS) 16 Unit(s) SubCutaneous at bedtime  insulin lispro (HumaLOG) corrective regimen sliding scale   SubCutaneous three times a day before meals  insulin lispro (HumaLOG) corrective regimen sliding scale   SubCutaneous at bedtime  insulin lispro Injectable (HumaLOG) 7 Unit(s) SubCutaneous three times a day before meals  metoprolol succinate ER 75 milliGRAM(s) Oral two times a day  rivaroxaban 15 milliGRAM(s) Oral with dinner  senna 2 Tablet(s) Oral at bedtime  sodium chloride 0.9% with potassium chloride 20 mEq/L 1000 milliLiter(s) IV Continuous <Continuous>  tamsulosin 0.4 milliGRAM(s) Oral at bedtime      FAMILY HISTORY:  FH: atrial fibrillation: sister  Family history of heart disease: father  age 71      SOCIAL HISTORY:  Smoking: [ ]Yes [ x]No  ETOH: [ ]Yes [x ]No  Drug Use: [ ]Yes [ ]No   [x ] Single[ ]    T(F): 98.6 (10-04-19 @ 08:12), Max: 98.6 (10-04-19 @ 08:12)  HR: 97 (10-04-19 @ 08:12)  BP: 90/69 (10-04-19 @ 08:12)  RR: 18 (10-04-19 @ 08:12)  SpO2: 98% (10-04-19 @ 08:12)  Wt(kg): --    PHYSICAL EXAM:  General: alert, no acute distress  Eyes:  anicteric, no conjunctival injection, no discharge  Oropharynx: no lesions or injection 	  Neck: supple, without adenopathy  Lungs: clear to auscultation  Heart: irregular rate and rhythm; no murmur, rubs or gallops  Abdomen: soft, nondistended, nontender, without mass or organomegaly  Skin: no lesions  Extremities: left foot just above heel and below the achilles insertion is faintly ecchymotic. Dorsal foot with patch of red and red over shin. thigh no swelling, No cords, no adenopathy, mild tenderness. Right leg looks normal. No joints swollen  Neurologic: alert, oriented, moves all extremities    LAB RESULTS:                        12.0   10.64 )-----------( 367      ( 04 Oct 2019 08:26 )             36.3     10    133<L>  |  87<L>  |  53<H>  ----------------------------<  274<H>  3.2<L>   |  31  |  1.81<H>    Ca    9.7      04 Oct 2019 05:48  Phos  3.0     10-  Mg     2.7     10-    TPro  7.5  /  Alb  4.2  /  TBili  1.4<H>  /  DBili  x   /  AST  24  /  ALT  18  /  AlkPhos  137<H>  10-04    LIVER FUNCTIONS - ( 04 Oct 2019 05:48 )  Alb: 4.2 g/dL / Pro: 7.5 g/dL / ALK PHOS: 137 U/L / ALT: 18 U/L / AST: 24 U/L / GGT: x               MICROBIOLOGY:  RECENT CULTURES:        RADIOLOGY REVIEWED:    < from: VA Duplex Lower Ext Vein Scan, Left (10.03.19 @ 23:25) >    EXAM:  DUPLEX EXT VEINS LOWER LT                            PROCEDURE DATE:  10/03/2019            INTERPRETATION:  CLINICAL INFORMATION: Left lower extremity swelling and   redness, pain    COMPARISON: Right lower extremity venous ultrasound 2019.    TECHNIQUE: Duplex sonography of the LEFT LOWER extremity veins with color   and spectral Doppler, with and without compression.      FINDINGS:    There is normal compressibility of the left common femoral, femoral and   popliteal veins.     The contralateral common femoral vein is patent.    Doppler examination shows normal spontaneous and phasic flow.    No calf vein thrombosis is detected.    IMPRESSION:     No evidence of left lower extremity deep venous thrombosis.    < end of copied text >  < from: Xray Chest 1 View AP/PA (10.04.19 @ 07:38) >  EXAM:  XR CHEST AP OR PA 1V                            PROCEDURE DATE:  10/04/2019            INTERPRETATION:  CLINICAL INFORMATION: Shortness of breath.    EXAM: Frontal radiograph of the chest.    COMPARISON: Chest radiograph from 2019    FINDINGS:  The heart size is normal.    The lungs are clear. No pneumothorax or pleural effusions.    The bones are unremarkable.    IMPRESSION: Clear lungs.    < end of copied text >        Impression:  Patient with cellulitis of the left foot and leg. I suspect he may have had some rubbing of the foot due to recent edema and having to squeeze into a shoe. I don't suspect it is related to the recent thigh hematoma except maybe his edema from chf was a little worse on the left than right, nor the recent cellultis of the right foot. His joints dont seem involved so gout unlikely  Recommendations:  continue antibiotics with cefazolin  elevate leg  hope for po antibiotics soon

## 2019-10-04 NOTE — H&P ADULT - ATTENDING COMMENTS
Patient assigned to me by night hospitalist in charge for management and care for patient for this evening only. Care to be resumed by day hospitalist (Dr. Mcdaniel) in the morning and thereafter.     Patient's care rendered on 10/04/19 at 3AM.      I was physically present for the key portions of the evaluation and management (E/M) service provided.  I agree with the above history, physical, and plan which I have reviewed and edited where appropriate.     Plan discussed with Patient, RN.

## 2019-10-04 NOTE — H&P ADULT - PROBLEM SELECTOR PLAN 1
Patient with likely cellulitis, no evidence of DVT on US  s/p IV unasyn, which will continue for now; no open ulcers/wounds noticed  f/u blood culture  consider vascular consult, ?need NIKHIL/PVR  check ESR, CRP, Procalcitonin

## 2019-10-04 NOTE — H&P ADULT - HISTORY OF PRESENT ILLNESS
63 yo male with PMH of Afib on xarelto, HTN, HLD, T2DM, diastolic CHF, CAD s/p stents, LLE hematoma sent here from cardiologist's office for irregular EKG and left foot pain/swelling.  Patient was recently discharged on 09/26 after being admitted for SOB and LLE 2/2 to acute on chronic diastolic CHF, treated with IV lasix, transitioned to PO lasix and metolazone.  During hospitalization, patient had hypoglycemia, was seen by endocrine, lantus was reduced prior to discharge. 63 yo male with PMH of Afib on xarelto, HTN, HLD, T2DM, diastolic CHF, CAD s/p stents, LLE hematoma sent here from cardiologist's office for irregular EKG and left foot pain/swelling.  Patient was recently discharged on 09/26 after being admitted for SOB and LLE 2/2 to acute on chronic diastolic CHF, treated with IV lasix, transitioned to PO lasix and metolazone.  During hospitalization, patient had hypoglycemia, was seen by endocrine, lantus was reduced prior to discharge.  Patient states that he was taking lasix 80mg once a day since discharge.  Few days ago, patient felt lightheaded and dizzy when trying to get up from a sitting position.  He saw his PMD, who stopped metolazone and told him to continue lasix.  Swelling of LLE initially improved, but for last three days he started having swelling of left ankle, which then started becoming red and painful.  He saw his PCP, who gave him tramadol for pain.  Yesterday, patient went to his cardiologist who sent patient here for further evaluation and management.  Patient denies any fall or trauma to the foot.  Denies any open wound.  Denies fever, chills, nausea, vomiting, dysuria or GI symptoms. 63 yo male with PMH of Afib on xarelto, HTN, HLD, T2DM, diastolic CHF, CAD s/p stents, LLE hematoma sent here from cardiologist's office for irregular EKG and left foot pain/swelling.  Patient was recently discharged on 09/26 after being admitted for SOB and LLE 2/2 to acute on chronic diastolic CHF, treated with IV lasix, transitioned to PO lasix and metolazone.  During hospitalization, patient had hypoglycemia, was seen by endocrine, lantus was reduced prior to discharge.  Patient states that he was taking lasix 80mg once a day since discharge.  Few days ago, patient felt lightheaded and dizzy when trying to get up from a sitting position.  He saw his PMD, who stopped metolazone and told him to continue lasix.  Swelling of LLE initially improved, but for last three days he started having swelling of left ankle, which then started becoming red and painful.  He saw his PCP, who gave him tramadol for pain.  Yesterday, patient went to his cardiologist who sent patient here for further evaluation and management.  Patient denies any fall or trauma to the foot.  Denies any open wound.  Denies fever, chills, nausea, vomiting, chest pain, SOB, dysuria or GI symptoms. 65 yo male with PMH of Afib on xarelto, HTN, HLD, T2DM, diastolic CHF, CAD s/p stents, LLE hematoma sent here from cardiologist's office for irregular EKG and left foot pain/swelling. Patient states that he was taking lasix 80mg once a day and metolazone since last hospitalization for CHF.  Few days ago, patient felt lightheaded and dizzy when trying to get up from a sitting position.  He saw his PMD, who stopped metolazone and told him to continue lasix.  Swelling of LLE initially improved, but for last three days he started having swelling of left ankle, which then started becoming red and painful.  He saw his PCP, who gave him tramadol for pain.  Yesterday, patient went to his cardiologist who sent patient here for further evaluation and management.  Patient denies any fall or trauma to the foot.  Denies any open wound.  Denies fever, chills, nausea, vomiting, chest pain, SOB, dysuria or GI symptoms.      Of note, patient was hospitalized from 8/29-9/8/2019 for LLE cellulitis. He completed a course of antibiotics was noted to be in afib with RVR and DCCV was canceled due to finding of early thormbus/smoke on LA on [facility].  Pt was continued on xarelto. Pt was transfused 1 u PRBC for anemia, found to have bleeding to psoas muscle with hematoma. He was noted to have BARRY, thus lasix and ACEI were held.  He was again readmitted 09/24-09/26 after being admitted for SOB and LLE 2/2 to acute on chronic diastolic CHF, treated with IV lasix, transitioned to PO lasix and metolazone.

## 2019-10-04 NOTE — CONSULT NOTE ADULT - SUBJECTIVE AND OBJECTIVE BOX
64 year old male with a history of chronic AF, CAD S/P stents, CHF now presents with painful swelling ecchymosis as well as erythema in the left foot calcaneal area as well as the left foot plantar area.  The patient was started on Unasyn in the ER    PMH:  NIDDM, HTN, elevated cholesterol    Meds:  ASA 81 mg qd             Xarelto 15 mg qd             Atorvastatin 80 mg qd             Diltiazem 180 mg qd             Metoprolol 75 mg bid             Unasyn             Flomax 0.4 mg qd    /94  HR 97 (AF)  Lungs clear  Irregular rhythm 2/6 systolic murmur  No edema  Ecchymosis and erythema in the left calcaneal area as well as erythema in the left plantar area    WBC 14.84 down to 10.64  Hgb 12.0  Hct 36.3  Plt 367K  BUN 53  Crt 1.81  Na 133  K 3.2    EKG:  AF LVH with repolarization changes    Imp: CAD with chronic AF - stable on the current regimen  Erythema of the calcaneal and plantar regions of the left foot is possibly consistent with cellulitis  Would obtain a Vascular evaluation re: why patient is getting recurrent foot / lower extremity infections

## 2019-10-05 LAB
ANION GAP SERPL CALC-SCNC: 15 MMOL/L — SIGNIFICANT CHANGE UP (ref 5–17)
BUN SERPL-MCNC: 34 MG/DL — HIGH (ref 7–23)
CALCIUM SERPL-MCNC: 9.2 MG/DL — SIGNIFICANT CHANGE UP (ref 8.4–10.5)
CHLORIDE SERPL-SCNC: 95 MMOL/L — LOW (ref 96–108)
CO2 SERPL-SCNC: 28 MMOL/L — SIGNIFICANT CHANGE UP (ref 22–31)
CREAT SERPL-MCNC: 1.36 MG/DL — HIGH (ref 0.5–1.3)
GLUCOSE BLDC GLUCOMTR-MCNC: 135 MG/DL — HIGH (ref 70–99)
GLUCOSE BLDC GLUCOMTR-MCNC: 181 MG/DL — HIGH (ref 70–99)
GLUCOSE BLDC GLUCOMTR-MCNC: 223 MG/DL — HIGH (ref 70–99)
GLUCOSE BLDC GLUCOMTR-MCNC: 319 MG/DL — HIGH (ref 70–99)
GLUCOSE SERPL-MCNC: 213 MG/DL — HIGH (ref 70–99)
HCT VFR BLD CALC: 36.5 % — LOW (ref 39–50)
HGB BLD-MCNC: 11.7 G/DL — LOW (ref 13–17)
MCHC RBC-ENTMCNC: 29.1 PG — SIGNIFICANT CHANGE UP (ref 27–34)
MCHC RBC-ENTMCNC: 32.1 GM/DL — SIGNIFICANT CHANGE UP (ref 32–36)
MCV RBC AUTO: 90.8 FL — SIGNIFICANT CHANGE UP (ref 80–100)
PLATELET # BLD AUTO: 353 K/UL — SIGNIFICANT CHANGE UP (ref 150–400)
POTASSIUM SERPL-MCNC: 3.4 MMOL/L — LOW (ref 3.5–5.3)
POTASSIUM SERPL-SCNC: 3.4 MMOL/L — LOW (ref 3.5–5.3)
RBC # BLD: 4.02 M/UL — LOW (ref 4.2–5.8)
RBC # FLD: 15.1 % — HIGH (ref 10.3–14.5)
SODIUM SERPL-SCNC: 138 MMOL/L — SIGNIFICANT CHANGE UP (ref 135–145)
WBC # BLD: 11.39 K/UL — HIGH (ref 3.8–10.5)
WBC # FLD AUTO: 11.39 K/UL — HIGH (ref 3.8–10.5)

## 2019-10-05 PROCEDURE — 93010 ELECTROCARDIOGRAM REPORT: CPT

## 2019-10-05 RX ORDER — POTASSIUM CHLORIDE 20 MEQ
20 PACKET (EA) ORAL ONCE
Refills: 0 | Status: COMPLETED | OUTPATIENT
Start: 2019-10-05 | End: 2019-10-05

## 2019-10-05 RX ORDER — TRAMADOL HYDROCHLORIDE 50 MG/1
25 TABLET ORAL ONCE
Refills: 0 | Status: DISCONTINUED | OUTPATIENT
Start: 2019-10-05 | End: 2019-10-05

## 2019-10-05 RX ORDER — TRAMADOL HYDROCHLORIDE 50 MG/1
25 TABLET ORAL ONCE
Refills: 0 | Status: DISCONTINUED | OUTPATIENT
Start: 2019-10-05 | End: 2019-10-06

## 2019-10-05 RX ADMIN — Medication 81 MILLIGRAM(S): at 12:00

## 2019-10-05 RX ADMIN — Medication 100 MILLIGRAM(S): at 20:40

## 2019-10-05 RX ADMIN — RIVAROXABAN 15 MILLIGRAM(S): KIT at 17:29

## 2019-10-05 RX ADMIN — Medication 7 UNIT(S): at 12:24

## 2019-10-05 RX ADMIN — Medication 7 UNIT(S): at 08:16

## 2019-10-05 RX ADMIN — Medication 20 MILLIEQUIVALENT(S): at 12:00

## 2019-10-05 RX ADMIN — INSULIN GLARGINE 16 UNIT(S): 100 INJECTION, SOLUTION SUBCUTANEOUS at 21:25

## 2019-10-05 RX ADMIN — Medication 100 MILLIGRAM(S): at 05:37

## 2019-10-05 RX ADMIN — Medication 7 UNIT(S): at 17:28

## 2019-10-05 RX ADMIN — TRAMADOL HYDROCHLORIDE 25 MILLIGRAM(S): 50 TABLET ORAL at 08:53

## 2019-10-05 RX ADMIN — Medication 75 MILLIGRAM(S): at 05:38

## 2019-10-05 RX ADMIN — Medication 8: at 12:24

## 2019-10-05 RX ADMIN — TAMSULOSIN HYDROCHLORIDE 0.4 MILLIGRAM(S): 0.4 CAPSULE ORAL at 20:28

## 2019-10-05 RX ADMIN — Medication 180 MILLIGRAM(S): at 05:37

## 2019-10-05 RX ADMIN — ATORVASTATIN CALCIUM 80 MILLIGRAM(S): 80 TABLET, FILM COATED ORAL at 20:28

## 2019-10-05 RX ADMIN — DEXTROSE MONOHYDRATE, SODIUM CHLORIDE, AND POTASSIUM CHLORIDE 50 MILLILITER(S): 50; .745; 4.5 INJECTION, SOLUTION INTRAVENOUS at 05:38

## 2019-10-05 RX ADMIN — Medication 4: at 08:17

## 2019-10-05 RX ADMIN — TRAMADOL HYDROCHLORIDE 25 MILLIGRAM(S): 50 TABLET ORAL at 07:53

## 2019-10-05 RX ADMIN — Medication 100 MILLIGRAM(S): at 13:16

## 2019-10-05 RX ADMIN — Medication 75 MILLIGRAM(S): at 17:30

## 2019-10-05 NOTE — PROGRESS NOTE ADULT - ASSESSMENT
64M with PMHx of CAD (post-three stents), HTN, HLD, T2DM (complicated by peripheral neuropathy), afib on AC presenting, LLE hematoma now admitted with LLE cellulitis     Problem/Plan - 1:  ·  Problem: cellulitis : cont ancef   f/u cultures   f/ u with ID   f/u xray of foot and ankle   Problem/Plan - 2:  ·  Problem: CAD in native artery.  Plan:  Patient last had stent placed in 2016, thus is no longer taking brilinta. He denies any chest pain at this time.   cont cardiac meds     Problem/Plan - 3:  ·  Problem: Type 2 diabetes mellitus.  Plan:  cont meds and monitor     Problem/Plan - 4:  ·  Problem: BARRY on CKD (chronic kidney disease). Plan:  monitor and f./u with renla     Problem/Plan - 5:  ·  Problem: Afib. Plan: cont AC and rate control   Patient was planned for repeat cardiac evaluation on 10/17/2019 for potential cardioversion since last hospitalization found LV smoke  which precluded potential DCCV.    Problem/Plan - 6:  Problem: HLD (hyperlipidemia).Plan: cont meds     Problem/Plan - 7:  ·  Problem: HTN (hypertension). Plan:  cont meds and monitor     Problem/Plan - 8:  ·  Problem: Prophylactic measure.  Plan: VTE ppx: continue xarelto  Activity: OOB with assistance  Diet: DASH, consistent carb.

## 2019-10-05 NOTE — PROGRESS NOTE ADULT - SUBJECTIVE AND OBJECTIVE BOX
No pain, no shortness of breath      VITAL:  T(C): , Max: 37.2 (10-04-19 @ 20:47)  T(F): , Max: 98.9 (10-04-19 @ 20:47)  HR: 76 (10-05-19 @ 05:01)  BP: 145/71 (10-05-19 @ 05:01)  RR: 18 (10-05-19 @ 05:01)  SpO2: 99% (10-05-19 @ 05:01)      PHYSICAL EXAM:  Constitutional: NAD, Alert  HEENT: NCAT, MMM  Neck: Supple, No JVD  Respiratory: CTA-b/l  Cardiovascular: RRR s1s2, no m/r/g  Gastrointestinal: BS+, soft, NT/ND  Extremities: 1+ LLE edema; (+)scattered areas of erythema and warmth  Neurological: no focal deficits; strength grossly intact  Back: no CVAT b/l  Skin: No rashes, no nevi      LABS:                        12.0   10.64 )-----------( 367      ( 04 Oct 2019 08:26 )             36.3     Na(138)/K(3.4)/Cl(95)/HCO3(28)/BUN(34)/Cr(1.36)Glu(213)/Ca(9.2)/Mg(--)/PO4(--)    10-05 @ 06:42  Na(133)/K(3.2)/Cl(87)/HCO3(31)/BUN(53)/Cr(1.81)Glu(274)/Ca(9.7)/Mg(2.7)/PO4(3.0)    10-04 @ 05:48  Na(131)/K(3.3)/Cl(85)/HCO3(34)/BUN(60)/Cr(1.97)Glu(305)/Ca(9.9)/Mg(--)/PO4(--)    10-04 @ 01:39  Na(128)/K(3.0)/Cl(79)/HCO3(36)/BUN(67)/Cr(2.16)Glu(442)/Ca(10.1)/Mg(--)/PO4(--)    10-03 @ 22:16    uric 10.7      IMPRESSION: 64M w/ HTN, DM2, AFib, CAD, HFpEF, and CKD3, 10/3/19 a/w left foot pain and BARRY    (1)Renal - CKD G3A1, with GFR ~40-50ml/min at baseline. Superimposed prerenally mediated BARRY from overdiuresis. Resolving with volume repletion.  (2)Hypokalemia - diuretic-induced whole-body deficit   (3)Hyponatremia - mild - hypovolemic - resolved with volume repletion with slightly hypertonic IVF  (4)Metabolic alkalosis - due to contraction - resolving  (5)CV- fragile volume status/difficult to establish euvolemia  (6)LLE pain/swelling - ID input appreciated - most likely cellulitis rather than gout    RECOMMEND:  (1)KCL 20meq po x 1  (2)D/C Colchicine  (3)D/C IVF; no diuretics for now  (4)Abx for GFR 40-50ml/min  (5)Upon discharge, would have him on Lasix 80mg po qd (no metolazone) but would have him hold the Lasix for weight of 133lb or lower        Markell Walton MD  [facility]  [phone] No pain, no shortness of breath      VITAL:  T(C): , Max: 37.2 (10-04-19 @ 20:47)  T(F): , Max: 98.9 (10-04-19 @ 20:47)  HR: 76 (10-05-19 @ 05:01)  BP: 145/71 (10-05-19 @ 05:01)  RR: 18 (10-05-19 @ 05:01)  SpO2: 99% (10-05-19 @ 05:01)      PHYSICAL EXAM:  Constitutional: NAD, Alert  HEENT: NCAT, MMM  Neck: Supple, No JVD  Respiratory: CTA-b/l  Cardiovascular: RRR s1s2, no m/r/g  Gastrointestinal: BS+, soft, NT/ND  Extremities: no edema; (+)scattered areas of erythema and warmth  Neurological: no focal deficits; strength grossly intact  Back: no CVAT b/l  Skin: No rashes, no nevi      LABS:                        12.0   10.64 )-----------( 367      ( 04 Oct 2019 08:26 )             36.3     Na(138)/K(3.4)/Cl(95)/HCO3(28)/BUN(34)/Cr(1.36)Glu(213)/Ca(9.2)/Mg(--)/PO4(--)    10-05 @ 06:42  Na(133)/K(3.2)/Cl(87)/HCO3(31)/BUN(53)/Cr(1.81)Glu(274)/Ca(9.7)/Mg(2.7)/PO4(3.0)    10-04 @ 05:48  Na(131)/K(3.3)/Cl(85)/HCO3(34)/BUN(60)/Cr(1.97)Glu(305)/Ca(9.9)/Mg(--)/PO4(--)    10-04 @ 01:39  Na(128)/K(3.0)/Cl(79)/HCO3(36)/BUN(67)/Cr(2.16)Glu(442)/Ca(10.1)/Mg(--)/PO4(--)    10-03 @ 22:16    uric 10.7      IMPRESSION: 64M w/ HTN, DM2, AFib, CAD, HFpEF, and CKD3, 10/3/19 a/w left foot pain and BARRY    (1)Renal - CKD G3A1, with GFR ~40-50ml/min at baseline. Superimposed prerenally mediated BARRY from overdiuresis. Resolving with volume repletion.  (2)Hypokalemia - diuretic-induced whole-body deficit   (3)Hyponatremia - mild - hypovolemic - resolved with volume repletion with slightly hypertonic IVF  (4)Metabolic alkalosis - due to contraction - resolving  (5)CV- fragile volume status/difficult to establish euvolemia  (6)LLE pain/swelling - ID input appreciated - most likely cellulitis rather than gout    RECOMMEND:  (1)KCL 20meq po x 1  (2)D/C Colchicine  (3)D/C IVF; no diuretics for now  (4)Abx for GFR 40-50ml/min  (5)Upon discharge, would have him on Lasix 80mg po qd (no metolazone) but would have him hold the Lasix for weight of 133lb or lower. I reiterated this information to the patient today.        Markell Walton MD  [facility]  [phone]

## 2019-10-05 NOTE — PROGRESS NOTE ADULT - SUBJECTIVE AND OBJECTIVE BOX
SUBJECTIVE: The patient denies chest pain, shortness of breath, arm pain or jaw pain, dizziness or palpitations.      	  MEDICATIONS:  diltiazem    milliGRAM(s) Oral daily  metoprolol succinate ER 75 milliGRAM(s) Oral two times a day  tamsulosin 0.4 milliGRAM(s) Oral at bedtime    ceFAZolin   IVPB 1000 milliGRAM(s) IV Intermittent every 8 hours        docusate sodium 100 milliGRAM(s) Oral two times a day  senna 2 Tablet(s) Oral at bedtime    atorvastatin 80 milliGRAM(s) Oral at bedtime  colchicine 0.3 milliGRAM(s) Oral daily  dextrose 40% Gel 15 Gram(s) Oral once PRN  dextrose 50% Injectable 12.5 Gram(s) IV Push once  dextrose 50% Injectable 25 Gram(s) IV Push once  dextrose 50% Injectable 25 Gram(s) IV Push once  glucagon  Injectable 1 milliGRAM(s) IntraMuscular once PRN  insulin glargine Injectable (LANTUS) 16 Unit(s) SubCutaneous at bedtime  insulin lispro (HumaLOG) corrective regimen sliding scale   SubCutaneous three times a day before meals  insulin lispro (HumaLOG) corrective regimen sliding scale   SubCutaneous at bedtime  insulin lispro Injectable (HumaLOG) 7 Unit(s) SubCutaneous three times a day before meals    aspirin enteric coated 81 milliGRAM(s) Oral daily  dextrose 5%. 1000 milliLiter(s) IV Continuous <Continuous>  rivaroxaban 15 milliGRAM(s) Oral with dinner  sodium chloride 0.9% with potassium chloride 20 mEq/L 1000 milliLiter(s) IV Continuous <Continuous>      REVIEW OF SYSTEMS:    CONSTITUTIONAL: No fever, weight loss, or fatigue  EYES: No eye pain, visual disturbances, or discharge  NECK: No pain or stiffness  RESPIRATORY: No cough, wheezing, chills or hemoptysis; No Shortness of Breath  CARDIOVASCULAR: No chest pain, palpitations, dizziness, or leg swelling  GASTROINTESTINAL: No abdominal or epigastric pain. No nausea, vomiting, or hematemesis; No diarrhea or constipation. No melena or hematochezia.  GENITOURINARY: No dysuria, frequency, hematuria, or incontinence  NEUROLOGICAL: No headaches, memory loss, loss of strength, numbness, or tremors  SKIN: No itching, burning, rashes, or lesions   LYMPH Nodes: No enlarged glands  MUSCULOSKELETAL: No joint pain or swelling; No muscle, back, or extremity pain  All other review of systems are negative.  	  [ ] Unable to obtain    PHYSICAL EXAM:  T(C): 36.8 (10-05-19 @ 05:01), Max: 37.2 (10-04-19 @ 20:47)  HR: 76 (10-05-19 @ 05:01) (76 - 87)  BP: 145/71 (10-05-19 @ 05:01) (117/67 - 146/63)  RR: 18 (10-05-19 @ 05:01) (17 - 18)  SpO2: 99% (10-05-19 @ 05:01) (98% - 99%)  Wt(kg): --  I&O's Summary    04 Oct 2019 07:01  -  05 Oct 2019 07:00  --------------------------------------------------------  IN: 1550 mL / OUT: 1000 mL / NET: 550 mL          PHYSICAL EXAM    Appearance: Normal	  HEENT:   Normal oral mucosa, PERRL, EOMI	  NECK: Soft and supple, No LAD, No JVD  Cardiovascular: Regular Rate and Rhythm, Normal S1 S2, No murmurs, No clicks, gallops or rubs  Respiratory: Lungs clear to auscultation	  Gastrointestinal:  Soft, Non-tender, + BS	  Skin: No rashes, No ecchymoses, No cyanosis  Neurologic: Non-focal  Extremities: No clubbing, cyanosis or edema  Vascular: Peripheral pulses palpable 2+ bilaterally    TELEMETRY: Atrial fibrillation moderate ventricular response	    	    LABS:	 	                          12.0   10.64 )-----------( 367      ( 04 Oct 2019 08:26 )             36.3     10-05    138  |  95<L>  |  34<H>  ----------------------------<  213<H>  3.4<L>   |  28  |  1.36<H>    Ca    9.2      05 Oct 2019 06:42  Phos  3.0     10-04  Mg     2.7     10-04    TPro  7.5  /  Alb  4.2  /  TBili  1.4<H>  /  DBili  x   /  AST  24  /  ALT  18  /  AlkPhos  137<H>  10-04

## 2019-10-05 NOTE — PROGRESS NOTE ADULT - SUBJECTIVE AND OBJECTIVE BOX
Patient is a 64y old  Male who presents with a chief complaint of left foot pain, swellling. (05 Oct 2019 18:41)                                                               INTERVAL HPI/OVERNIGHT EVENTS:    REVIEW OF SYSTEMS:     CONSTITUTIONAL: No weakness, fevers or chills  EYES/ENT: No visual changes , no ear ache   NECK: No pain or stiffness  RESPIRATORY: No cough, wheezing,  No shortness of breath  CARDIOVASCULAR: No chest pain or palpitations  GASTROINTESTINAL: No abdominal pain  . No nausea, vomiting, or hematemesis; No diarrhea or constipation. No melena or hematochezia.  GENITOURINARY: No dysuria, frequency or hematuria  NEUROLOGICAL: No numbness or weakness  SKIN: No itching, burning, rashes, or lesions                                                                                                                                                                                                                                                                                 Medications:  MEDICATIONS  (STANDING):  aspirin enteric coated 81 milliGRAM(s) Oral daily  atorvastatin 80 milliGRAM(s) Oral at bedtime  ceFAZolin   IVPB 1000 milliGRAM(s) IV Intermittent every 8 hours  dextrose 5%. 1000 milliLiter(s) (50 mL/Hr) IV Continuous <Continuous>  dextrose 50% Injectable 12.5 Gram(s) IV Push once  dextrose 50% Injectable 25 Gram(s) IV Push once  dextrose 50% Injectable 25 Gram(s) IV Push once  diltiazem    milliGRAM(s) Oral daily  docusate sodium 100 milliGRAM(s) Oral two times a day  insulin glargine Injectable (LANTUS) 16 Unit(s) SubCutaneous at bedtime  insulin lispro (HumaLOG) corrective regimen sliding scale   SubCutaneous three times a day before meals  insulin lispro (HumaLOG) corrective regimen sliding scale   SubCutaneous at bedtime  insulin lispro Injectable (HumaLOG) 7 Unit(s) SubCutaneous three times a day before meals  metoprolol succinate ER 75 milliGRAM(s) Oral two times a day  rivaroxaban 15 milliGRAM(s) Oral with dinner  senna 2 Tablet(s) Oral at bedtime  tamsulosin 0.4 milliGRAM(s) Oral at bedtime  traMADol 25 milliGRAM(s) Oral once    MEDICATIONS  (PRN):  dextrose 40% Gel 15 Gram(s) Oral once PRN Blood Glucose LESS THAN 70 milliGRAM(s)/deciliter  glucagon  Injectable 1 milliGRAM(s) IntraMuscular once PRN Glucose LESS THAN 70 milligrams/deciliter       Allergies    No Known Allergies    Intolerances      Vital Signs Last 24 Hrs  T(C): 36.7 (05 Oct 2019 20:04), Max: 36.8 (05 Oct 2019 05:01)  T(F): 98 (05 Oct 2019 20:04), Max: 98.3 (05 Oct 2019 05:01)  HR: 77 (05 Oct 2019 20:04) (76 - 88)  BP: 134/76 (05 Oct 2019 20:04) (134/68 - 145/74)  BP(mean): --  RR: 18 (05 Oct 2019 20:04) (18 - 18)  SpO2: 99% (05 Oct 2019 20:04) (99% - 100%)  CAPILLARY BLOOD GLUCOSE      POCT Blood Glucose.: 181 mg/dL (05 Oct 2019 21:08)  POCT Blood Glucose.: 135 mg/dL (05 Oct 2019 16:36)  POCT Blood Glucose.: 319 mg/dL (05 Oct 2019 12:00)  POCT Blood Glucose.: 223 mg/dL (05 Oct 2019 08:00)  POCT Blood Glucose.: 258 mg/dL (04 Oct 2019 22:41)      10-04 @ 07:01  -  10-05 @ 07:00  --------------------------------------------------------  IN: 1550 mL / OUT: 1000 mL / NET: 550 mL    10-05 @ 07:01  -  10-05 @ 22:19  --------------------------------------------------------  IN: 1020 mL / OUT: 400 mL / NET: 620 mL      Physical Exam:    Daily     Daily   General:  Well appearing, NAD, not cachetic  HEENT:  Nonicteric, PERRLA  CV:  RRR, S1S2   Lungs:  CTA B/L, no wheezes, rales, rhonchi  Abdomen:  Soft, non-tender, no distended, positive BS  Extremities: good pulses   improving erythema of LLE   ecchymosis over drsum of L foot and ankle   Neuro:  AAOx3, non-focal, grossly intact                                                                                                                                                                                                                                                                                                LABS:                               11.7   11.39 )-----------( 353      ( 05 Oct 2019 11:10 )             36.5                      10-05    138  |  95<L>  |  34<H>  ----------------------------<  213<H>  3.4<L>   |  28  |  1.36<H>    Ca    9.2      05 Oct 2019 06:42  Phos  3.0     10-04  Mg     2.7     10-04    TPro  7.5  /  Alb  4.2  /  TBili  1.4<H>  /  DBili  x   /  AST  24  /  ALT  18  /  AlkPhos  137<H>  10-04

## 2019-10-05 NOTE — PROGRESS NOTE ADULT - ASSESSMENT
64 year old male with a history of chronic AF, CAD S/P stents, CHF now presents with painful swelling ecchymosis as well as erythema in the left foot calcaneal area as well as the left foot plantar area.  The patient was started on Unasyn in the ER  He reports that the foot is feeling better today and will attempt to walk.  Supplementing with potassium, keep over 4.0  Appreciate ID input  CAD with chronic AF - stable on the current regimen

## 2019-10-05 NOTE — PROGRESS NOTE ADULT - SUBJECTIVE AND OBJECTIVE BOX
CC: f/u for  cellulitis of the left leg  Patient reports  still some pain over dorsal left foot  REVIEW OF SYSTEMS:  All other review of systems negative (Comprehensive ROS)    Antimicrobials Day #  :  ceFAZolin   IVPB 1000 milliGRAM(s) IV Intermittent every 8 hours    Other Medications Reviewed    T(F): 97.9 (10-05-19 @ 12:44), Max: 98.9 (10-04-19 @ 20:47)  HR: 88 (10-05-19 @ 17:35)  BP: 145/74 (10-05-19 @ 17:35)  RR: 18 (10-05-19 @ 12:44)  SpO2: 100% (10-05-19 @ 12:44)  Wt(kg): --    PHYSICAL EXAM:  General: alert, no acute distress  Eyes:  anicteric, no conjunctival injection, no discharge  Oropharynx: no lesions or injection 	  Neck: supple, without adenopathy  Lungs: clear to auscultation  Heart: irregular rate and rhythm; no murmur, rubs or gallops  Abdomen: soft, nondistended, nontender, without mass or organomegaly  Skin: no lesions  Extremities: no clubbing, cyanosis,. improved left leg edema, redness of the left pretibial area is much better , redness around heal area much better, dorsal foot purplish red  Neurologic: alert, oriented, moves all extremities    LAB RESULTS:                        11.7   11.39 )-----------( 353      ( 05 Oct 2019 11:10 )             36.5     10-05    138  |  95<L>  |  34<H>  ----------------------------<  213<H>  3.4<L>   |  28  |  1.36<H>    Ca    9.2      05 Oct 2019 06:42  Phos  3.0     10-04  Mg     2.7     10-04    TPro  7.5  /  Alb  4.2  /  TBili  1.4<H>  /  DBili  x   /  AST  24  /  ALT  18  /  AlkPhos  137<H>  10-04    LIVER FUNCTIONS - ( 04 Oct 2019 05:48 )  Alb: 4.2 g/dL / Pro: 7.5 g/dL / ALK PHOS: 137 U/L / ALT: 18 U/L / AST: 24 U/L / GGT: x             MICROBIOLOGY:  RECENT CULTURES:  10-04 @ 02:10 .Blood     No growth to date.      10-04 @ 02:09 .Blood     No growth to date.      RADIOLOGY REVIEWED:  < from: [facility] Duplex Lower Ext Vein Scan, Left (10.03.19 @ 23:25) >  EXAM:  DUPLEX EXT VEINS LOWER LT                            PROCEDURE DATE:  10/03/2019            INTERPRETATION:  CLINICAL INFORMATION: Left lower extremity swelling and   redness, pain    COMPARISON: Right lower extremity venous ultrasound 9/2/2019.    TECHNIQUE: Duplex sonography of the LEFT LOWER extremity veins with color   and spectral Doppler, with and without compression.      FINDINGS:    There is normal compressibility of the left common femoral, femoral and   popliteal veins.     The contralateral common femoral vein is patent.    Doppler examination shows normal spontaneous and phasic flow.    No calf vein thrombosis is detected.    IMPRESSION:     No evidence of left lower extremity deep venous thrombosis.      < end of copied text >        Assessment:  patient with left leg cellulitis that seems improved on ancef and elevation. dorsal foot looks ecchymotic at this point. Overall he is better.   Plan:  continue ancef  continue to elevate  maybe can go to oral antibiotics tomorrow

## 2019-10-06 ENCOUNTER — TRANSCRIPTION ENCOUNTER (OUTPATIENT)
Age: 64
End: 2019-10-06

## 2019-10-06 VITALS
DIASTOLIC BLOOD PRESSURE: 71 MMHG | OXYGEN SATURATION: 94 % | HEART RATE: 80 BPM | SYSTOLIC BLOOD PRESSURE: 146 MMHG | RESPIRATION RATE: 18 BRPM

## 2019-10-06 LAB
ANION GAP SERPL CALC-SCNC: 14 MMOL/L — SIGNIFICANT CHANGE UP (ref 5–17)
BUN SERPL-MCNC: 26 MG/DL — HIGH (ref 7–23)
CALCIUM SERPL-MCNC: 9.1 MG/DL — SIGNIFICANT CHANGE UP (ref 8.4–10.5)
CHLORIDE SERPL-SCNC: 97 MMOL/L — SIGNIFICANT CHANGE UP (ref 96–108)
CO2 SERPL-SCNC: 27 MMOL/L — SIGNIFICANT CHANGE UP (ref 22–31)
CREAT SERPL-MCNC: 1.23 MG/DL — SIGNIFICANT CHANGE UP (ref 0.5–1.3)
GLUCOSE BLDC GLUCOMTR-MCNC: 120 MG/DL — HIGH (ref 70–99)
GLUCOSE BLDC GLUCOMTR-MCNC: 206 MG/DL — HIGH (ref 70–99)
GLUCOSE BLDC GLUCOMTR-MCNC: 269 MG/DL — HIGH (ref 70–99)
GLUCOSE BLDC GLUCOMTR-MCNC: 80 MG/DL — SIGNIFICANT CHANGE UP (ref 70–99)
GLUCOSE SERPL-MCNC: 209 MG/DL — HIGH (ref 70–99)
HCT VFR BLD CALC: 35.1 % — LOW (ref 39–50)
HGB BLD-MCNC: 11.4 G/DL — LOW (ref 13–17)
MCHC RBC-ENTMCNC: 29.2 PG — SIGNIFICANT CHANGE UP (ref 27–34)
MCHC RBC-ENTMCNC: 32.5 GM/DL — SIGNIFICANT CHANGE UP (ref 32–36)
MCV RBC AUTO: 90 FL — SIGNIFICANT CHANGE UP (ref 80–100)
PLATELET # BLD AUTO: 344 K/UL — SIGNIFICANT CHANGE UP (ref 150–400)
POTASSIUM SERPL-MCNC: 4.2 MMOL/L — SIGNIFICANT CHANGE UP (ref 3.5–5.3)
POTASSIUM SERPL-SCNC: 4.2 MMOL/L — SIGNIFICANT CHANGE UP (ref 3.5–5.3)
RBC # BLD: 3.9 M/UL — LOW (ref 4.2–5.8)
RBC # FLD: 14.8 % — HIGH (ref 10.3–14.5)
SODIUM SERPL-SCNC: 138 MMOL/L — SIGNIFICANT CHANGE UP (ref 135–145)
WBC # BLD: 12.56 K/UL — HIGH (ref 3.8–10.5)
WBC # FLD AUTO: 12.56 K/UL — HIGH (ref 3.8–10.5)

## 2019-10-06 PROCEDURE — 84145 PROCALCITONIN (PCT): CPT

## 2019-10-06 PROCEDURE — 83735 ASSAY OF MAGNESIUM: CPT

## 2019-10-06 PROCEDURE — 73620 X-RAY EXAM OF FOOT: CPT | Mod: 26,LT

## 2019-10-06 PROCEDURE — 71045 X-RAY EXAM CHEST 1 VIEW: CPT

## 2019-10-06 PROCEDURE — 73610 X-RAY EXAM OF ANKLE: CPT

## 2019-10-06 PROCEDURE — 99285 EMERGENCY DEPT VISIT HI MDM: CPT | Mod: 25

## 2019-10-06 PROCEDURE — 73610 X-RAY EXAM OF ANKLE: CPT | Mod: 26,LT

## 2019-10-06 PROCEDURE — 84550 ASSAY OF BLOOD/URIC ACID: CPT

## 2019-10-06 PROCEDURE — 80048 BASIC METABOLIC PNL TOTAL CA: CPT

## 2019-10-06 PROCEDURE — 85730 THROMBOPLASTIN TIME PARTIAL: CPT

## 2019-10-06 PROCEDURE — 87040 BLOOD CULTURE FOR BACTERIA: CPT

## 2019-10-06 PROCEDURE — 84100 ASSAY OF PHOSPHORUS: CPT

## 2019-10-06 PROCEDURE — 86140 C-REACTIVE PROTEIN: CPT

## 2019-10-06 PROCEDURE — 83605 ASSAY OF LACTIC ACID: CPT

## 2019-10-06 PROCEDURE — 93005 ELECTROCARDIOGRAM TRACING: CPT

## 2019-10-06 PROCEDURE — 93971 EXTREMITY STUDY: CPT

## 2019-10-06 PROCEDURE — 85610 PROTHROMBIN TIME: CPT

## 2019-10-06 PROCEDURE — 80053 COMPREHEN METABOLIC PANEL: CPT

## 2019-10-06 PROCEDURE — 85652 RBC SED RATE AUTOMATED: CPT

## 2019-10-06 PROCEDURE — 82962 GLUCOSE BLOOD TEST: CPT

## 2019-10-06 PROCEDURE — 96374 THER/PROPH/DIAG INJ IV PUSH: CPT

## 2019-10-06 PROCEDURE — 73620 X-RAY EXAM OF FOOT: CPT

## 2019-10-06 PROCEDURE — 85027 COMPLETE CBC AUTOMATED: CPT

## 2019-10-06 RX ORDER — ACETAMINOPHEN 500 MG
1000 TABLET ORAL ONCE
Refills: 0 | Status: COMPLETED | OUTPATIENT
Start: 2019-10-06 | End: 2019-10-06

## 2019-10-06 RX ORDER — TRAMADOL HYDROCHLORIDE 50 MG/1
50 TABLET ORAL EVERY 8 HOURS
Refills: 0 | Status: DISCONTINUED | OUTPATIENT
Start: 2019-10-06 | End: 2019-10-06

## 2019-10-06 RX ORDER — CEPHALEXIN 500 MG
1 CAPSULE ORAL
Qty: 28 | Refills: 0
Start: 2019-10-06 | End: 2019-10-12

## 2019-10-06 RX ADMIN — Medication 7 UNIT(S): at 17:35

## 2019-10-06 RX ADMIN — Medication 7 UNIT(S): at 12:09

## 2019-10-06 RX ADMIN — Medication 6: at 12:08

## 2019-10-06 RX ADMIN — Medication 7 UNIT(S): at 08:23

## 2019-10-06 RX ADMIN — Medication 100 MILLIGRAM(S): at 05:28

## 2019-10-06 RX ADMIN — Medication 81 MILLIGRAM(S): at 12:08

## 2019-10-06 RX ADMIN — Medication 4: at 08:23

## 2019-10-06 RX ADMIN — Medication 400 MILLIGRAM(S): at 06:48

## 2019-10-06 RX ADMIN — TRAMADOL HYDROCHLORIDE 25 MILLIGRAM(S): 50 TABLET ORAL at 00:22

## 2019-10-06 RX ADMIN — RIVAROXABAN 15 MILLIGRAM(S): KIT at 17:36

## 2019-10-06 RX ADMIN — Medication 100 MILLIGRAM(S): at 14:29

## 2019-10-06 RX ADMIN — Medication 100 MILLIGRAM(S): at 05:29

## 2019-10-06 RX ADMIN — Medication 75 MILLIGRAM(S): at 05:29

## 2019-10-06 RX ADMIN — Medication 75 MILLIGRAM(S): at 17:37

## 2019-10-06 RX ADMIN — TRAMADOL HYDROCHLORIDE 25 MILLIGRAM(S): 50 TABLET ORAL at 01:30

## 2019-10-06 RX ADMIN — Medication 180 MILLIGRAM(S): at 05:29

## 2019-10-06 NOTE — CONSULT NOTE ADULT - SUBJECTIVE AND OBJECTIVE BOX
Podiatry pager #: [facility] [phone]/ [phone]    Patient is a 64y old  Male who presents with a chief complaint of left foot pain, swellling. (06 Oct 2019 08:21)      HPI:  65 yo male with PMH of Afib on xarelto, HTN, HLD, T2DM, diastolic CHF, CAD s/p stents, LLE hematoma sent here from cardiologist's office for irregular EKG and left foot pain/swelling. Patient states that he was taking lasix 80mg once a day and metolazone since last hospitalization for CHF.  Few days ago, patient felt lightheaded and dizzy when trying to get up from a sitting position.  He saw his PMD, who stopped metolazone and told him to continue lasix.  Swelling of LLE initially improved, but for last three days he started having swelling of left ankle, which then started becoming red and painful.  He saw his PCP, who gave him tramadol for pain.  Yesterday, patient went to his cardiologist who sent patient here for further evaluation and management.  Patient denies any fall or trauma to the foot.  Denies any open wound.  Denies fever, chills, nausea, vomiting, chest pain, SOB, dysuria or GI symptoms.      Of note, patient was hospitalized from 8/29-9/8/2019 for LLE cellulitis. He completed a course of antibiotics was noted to be in afib with RVR and DCCV was canceled due to finding of early thormbus/smoke on LA on YVES.  Pt was continued on xarelto. Pt was transfused 1 u PRBC for anemia, found to have bleeding to psoas muscle with hematoma. He was noted to have BARRY, thus lasix and ACEI were held.  He was again readmitted 09/24-09/26 after being admitted for SOB and LLE 2/2 to acute on chronic diastolic CHF, treated with IV lasix, transitioned to PO lasix and metolazone. (04 Oct 2019 02:22)      PAST MEDICAL & SURGICAL HISTORY:  Hematoma of leg  Afib  Essential hypertension, hypertension with unspecified goal  Hyperlipidemia, unspecified hyperlipidemia type  Type 2 diabetes mellitus  CAD in native artery  Former smoker  s/p Carotid Endarterectomy: left      MEDICATIONS  (STANDING):  aspirin enteric coated 81 milliGRAM(s) Oral daily  atorvastatin 80 milliGRAM(s) Oral at bedtime  ceFAZolin   IVPB 1000 milliGRAM(s) IV Intermittent every 8 hours  dextrose 5%. 1000 milliLiter(s) (50 mL/Hr) IV Continuous <Continuous>  dextrose 50% Injectable 12.5 Gram(s) IV Push once  dextrose 50% Injectable 25 Gram(s) IV Push once  dextrose 50% Injectable 25 Gram(s) IV Push once  diltiazem    milliGRAM(s) Oral daily  docusate sodium 100 milliGRAM(s) Oral two times a day  insulin glargine Injectable (LANTUS) 16 Unit(s) SubCutaneous at bedtime  insulin lispro (HumaLOG) corrective regimen sliding scale   SubCutaneous three times a day before meals  insulin lispro (HumaLOG) corrective regimen sliding scale   SubCutaneous at bedtime  insulin lispro Injectable (HumaLOG) 7 Unit(s) SubCutaneous three times a day before meals  metoprolol succinate ER 75 milliGRAM(s) Oral two times a day  rivaroxaban 15 milliGRAM(s) Oral with dinner  senna 2 Tablet(s) Oral at bedtime  tamsulosin 0.4 milliGRAM(s) Oral at bedtime    MEDICATIONS  (PRN):  dextrose 40% Gel 15 Gram(s) Oral once PRN Blood Glucose LESS THAN 70 milliGRAM(s)/deciliter  glucagon  Injectable 1 milliGRAM(s) IntraMuscular once PRN Glucose LESS THAN 70 milligrams/deciliter  traMADol 50 milliGRAM(s) Oral every 8 hours PRN Pain      Allergies    No Known Allergies    Intolerances        VITALS:    Vital Signs Last 24 Hrs  T(C): 36.7 (06 Oct 2019 04:38), Max: 36.7 (05 Oct 2019 20:04)  T(F): 98.1 (06 Oct 2019 04:38), Max: 98.1 (06 Oct 2019 04:38)  HR: 73 (06 Oct 2019 04:38) (73 - 88)  BP: 145/65 (06 Oct 2019 04:38) (134/68 - 145/74)  BP(mean): --  RR: 18 (06 Oct 2019 04:38) (18 - 18)  SpO2: 100% (06 Oct 2019 04:38) (99% - 100%)    LABS:                          11.4   12.56 )-----------( 344      ( 06 Oct 2019 09:04 )             35.1       10-06    138  |  97  |  26<H>  ----------------------------<  209<H>  4.2   |  27  |  1.23    Ca    9.1      06 Oct 2019 06:33        CAPILLARY BLOOD GLUCOSE      POCT Blood Glucose.: 206 mg/dL (06 Oct 2019 07:36)  POCT Blood Glucose.: 181 mg/dL (05 Oct 2019 21:08)  POCT Blood Glucose.: 135 mg/dL (05 Oct 2019 16:36)  POCT Blood Glucose.: 319 mg/dL (05 Oct 2019 12:00)          LOWER EXTREMITY PHYSICAL EXAM:    Vasular: DP 1/4, B/L, PT 0/4 B/L. CFT <5 seconds B/L, Temperature gradient normal, B/L.   Neuro: Epicritic sensation intact to the level of digits, B/L.  Musculoskeletal/Ortho: bilateral cavus foot type   Skin: minimal erythema to the left dorsal foot, no open lesion.       RADIOLOGY & ADDITIONAL STUDIES:

## 2019-10-06 NOTE — DISCHARGE NOTE NURSING/CASE MANAGEMENT/SOCIAL WORK - NSDCPEPTCAREGIVEDUMATLIST _GEN_ALL_CORE
At [facility], we strive to deliver an exceptional experience to you, every time we see you.  If you receive a survey in the mail, please send us back your thoughts. We really do value your feedback.    Based on your medical history, these are the current health maintenance/preventive care services that you are due for (some may have been done at this visit.)  There are no preventive care reminders to display for this patient.      Suggested websites for health information:  Www.[email or web address] : Up to date and easily searchable information on multiple topics.  Www.medlineplus.gov : medication info, interactive tutorials, watch real surgeries online  Www.familydoctor.org : good info from the Academy of Family Physicians  Www.cdc.gov : public health info, travel advisories, epidemics (H1N1)  Www.aap.org : children's health info, normal development, vaccinations  Www.health.[facility].[place].us : [place] dept of health, public health issues in [place], N1N1    Your care team:                            Family Medicine Internal Medicine   MD Juanito Lynne MD Shantel Branch-Fleming, MD Katya Georgiev PA-C Nam Ho, MD Pediatrics   GRANT Kendall, ALYSSA Peguero APRCLIFF CNP   MD Kelly Tipton MD Deborah Mielke, MD Kim Thein, APRN CNP      Clinic hours: Monday - Thursday 7 am-7 pm; Fridays 7 am-5 pm.   Urgent care: Monday - Friday 11 am-9 pm; Saturday and Sunday 9 am-5 pm.  Pharmacy : Monday -Thursday 8 am-8 pm; Friday 8 am-6 pm; Saturday and Sunday 9 am-5 pm.     Clinic: [phone]   Pharmacy: [phone]    
Diabetes

## 2019-10-06 NOTE — PROGRESS NOTE ADULT - REASON FOR ADMISSION
left foot pain, swellling.

## 2019-10-06 NOTE — DISCHARGE NOTE PROVIDER - HOSPITAL COURSE
64M with PMHx of CAD (post-three stents), HTN, HLD, T2DM (complicated by peripheral neuropathy), afib on AC presenting,     LLE hematoma now admitted with LLE cellulitis     ID consulted     cellulitis that seems improved on ancef and elevation     dorsal foot looks ecchymotic at this point    continue ancef    continue to elevate    maybe can go to oral antibiotics tomorrow    Podiatry        DCP with med rec discussed with Dr Mcdaniel PT is cleared for DC home with no skilled PT needs     Follow up with Podiatry in one week Dr Chatman 64M with PMHx of CAD (post-three stents), HTN, HLD, T2DM (complicated by peripheral neuropathy), afib on AC presenting,     LLE hematoma now admitted with LLE cellulitis     ID consulted     cellulitis that seems improved on ancef and elevation     dorsal foot looks ecchymotic at this point    continue ancef    continue to elevate    maybe can go to oral antibiotics tomorrow    Podiatry consulted     weight bear as tolerated to the left foot in post-op shoe     DCP with med rec discussed with Dr Mcdaniel PT is cleared for DC home with no skilled PT needs     Follow up with Podiatry in one week Dr Chatman

## 2019-10-06 NOTE — CONSULT NOTE ADULT - REASON FOR ADMISSION
left foot pain, swellling.

## 2019-10-06 NOTE — DISCHARGE NOTE NURSING/CASE MANAGEMENT/SOCIAL WORK - PATIENT PORTAL LINK FT
You can access the FollowMyHealth Patient Portal offered by [facility] by registering at the following website: [email or web address]. By joining [facility]’s FollowMyHealth portal, you will also be able to view your health information using other applications (apps) compatible with our system.

## 2019-10-06 NOTE — PROGRESS NOTE ADULT - SUBJECTIVE AND OBJECTIVE BOX
SUBJECTIVE: The patient denies chest pain, shortness of breath, arm pain or jaw pain, dizziness or palpitations. Foot pain improved    MEDICATIONS  (STANDING):  aspirin enteric coated 81 milliGRAM(s) Oral daily  atorvastatin 80 milliGRAM(s) Oral at bedtime  ceFAZolin   IVPB 1000 milliGRAM(s) IV Intermittent every 8 hours  dextrose 5%. 1000 milliLiter(s) (50 mL/Hr) IV Continuous <Continuous>  dextrose 50% Injectable 12.5 Gram(s) IV Push once  dextrose 50% Injectable 25 Gram(s) IV Push once  dextrose 50% Injectable 25 Gram(s) IV Push once  diltiazem    milliGRAM(s) Oral daily  docusate sodium 100 milliGRAM(s) Oral two times a day  insulin glargine Injectable (LANTUS) 16 Unit(s) SubCutaneous at bedtime  insulin lispro (HumaLOG) corrective regimen sliding scale   SubCutaneous three times a day before meals  insulin lispro (HumaLOG) corrective regimen sliding scale   SubCutaneous at bedtime  insulin lispro Injectable (HumaLOG) 7 Unit(s) SubCutaneous three times a day before meals  metoprolol succinate ER 75 milliGRAM(s) Oral two times a day  rivaroxaban 15 milliGRAM(s) Oral with dinner  senna 2 Tablet(s) Oral at bedtime  tamsulosin 0.4 milliGRAM(s) Oral at bedtime    MEDICATIONS  (PRN):  dextrose 40% Gel 15 Gram(s) Oral once PRN Blood Glucose LESS THAN 70 milliGRAM(s)/deciliter  glucagon  Injectable 1 milliGRAM(s) IntraMuscular once PRN Glucose LESS THAN 70 milligrams/deciliter  traMADol 50 milliGRAM(s) Oral every 8 hours PRN Pain        REVIEW OF SYSTEMS:    CONSTITUTIONAL: No fever, weight loss, or fatigue  EYES: No eye pain, visual disturbances, or discharge  NECK: No pain or stiffness  RESPIRATORY: No cough, wheezing, chills or hemoptysis; No Shortness of Breath  CARDIOVASCULAR: No chest pain, palpitations, dizziness, or leg swelling  GASTROINTESTINAL: No abdominal or epigastric pain. No nausea, vomiting, or hematemesis; No diarrhea or constipation. No melena or hematochezia.  GENITOURINARY: No dysuria, frequency, hematuria, or incontinence  NEUROLOGICAL: No headaches, memory loss, loss of strength, numbness, or tremors  SKIN: No itching, burning, rashes, or lesions   LYMPH Nodes: No enlarged glands  MUSCULOSKELETAL: No joint pain or swelling; No muscle, back, or extremity pain  All other review of systems are negative.  	  [ ] Unable to obtain    PHYSICAL EXAM:  T(F): 98.1 (10-06-19 @ 04:38), Max: 98.1 (10-06-19 @ 04:38)  HR: 73 (10-06-19 @ 04:38) (73 - 88)  BP: 145/65 (10-06-19 @ 04:38) (134/68 - 145/74)  RR: 18 (10-06-19 @ 04:38) (18 - 18)  SpO2: 100% (10-06-19 @ 04:38) (99% - 100%)  Wt(kg): --  ,   I&O's Summary    05 Oct 2019 07:01  -  06 Oct 2019 07:00  --------------------------------------------------------  IN: 2320 mL / OUT: 1440 mL / NET: 880 mL          PHYSICAL EXAM    Appearance: Normal	  HEENT:   Normal oral mucosa, PERRL, EOMI	  NECK: Soft and supple, No LAD, No JVD  Cardiovascular: Regular Rate and Rhythm, Normal S1 S2, No murmurs, No clicks, gallops or rubs  Respiratory: Lungs clear to auscultation	  Gastrointestinal:  Soft, Non-tender, + BS	  Skin: No rashes, No ecchymoses, No cyanosis  Neurologic: Non-focal  Extremities: No clubbing, cyanosis or edema  Vascular: Peripheral pulses palpable 2+ bilaterally    TELEMETRY: Atrial fibrillation moderate ventricular response	    	    LABS:	 	                                     11.7   11.39 )-----------( 353      ( 05 Oct 2019 11:10 )             36.5               10-06    138  |  97  |  26<H>  ----------------------------<  209<H>  4.2   |  27  |  1.23    Ca    9.1      06 Oct 2019 06:33

## 2019-10-06 NOTE — PROGRESS NOTE ADULT - SUBJECTIVE AND OBJECTIVE BOX
Patient is a 64y old  Male who presents with a chief complaint of left foot pain, swellling. (06 Oct 2019 15:38)                                                               INTERVAL HPI/OVERNIGHT EVENTS:    REVIEW OF SYSTEMS:     CONSTITUTIONAL: No weakness, fevers or chills  EYES/ENT: No visual changes , no ear ache   NECK: No pain or stiffness  RESPIRATORY: No cough, wheezing,  No shortness of breath  CARDIOVASCULAR: No chest pain or palpitations  GASTROINTESTINAL: No abdominal pain  . No nausea, vomiting, or hematemesis; No diarrhea or constipation. No melena or hematochezia.  GENITOURINARY: No dysuria, frequency or hematuria  NEUROLOGICAL: No numbness or weakness  foot pain                                                                                                                                                                                                                                                                                 Medications:  MEDICATIONS  (STANDING):  aspirin enteric coated 81 milliGRAM(s) Oral daily  atorvastatin 80 milliGRAM(s) Oral at bedtime  ceFAZolin   IVPB 1000 milliGRAM(s) IV Intermittent every 8 hours  dextrose 5%. 1000 milliLiter(s) (50 mL/Hr) IV Continuous <Continuous>  dextrose 50% Injectable 12.5 Gram(s) IV Push once  dextrose 50% Injectable 25 Gram(s) IV Push once  dextrose 50% Injectable 25 Gram(s) IV Push once  diltiazem    milliGRAM(s) Oral daily  docusate sodium 100 milliGRAM(s) Oral two times a day  insulin glargine Injectable (LANTUS) 16 Unit(s) SubCutaneous at bedtime  insulin lispro (HumaLOG) corrective regimen sliding scale   SubCutaneous three times a day before meals  insulin lispro (HumaLOG) corrective regimen sliding scale   SubCutaneous at bedtime  insulin lispro Injectable (HumaLOG) 7 Unit(s) SubCutaneous three times a day before meals  metoprolol succinate ER 75 milliGRAM(s) Oral two times a day  rivaroxaban 15 milliGRAM(s) Oral with dinner  senna 2 Tablet(s) Oral at bedtime  tamsulosin 0.4 milliGRAM(s) Oral at bedtime    MEDICATIONS  (PRN):  dextrose 40% Gel 15 Gram(s) Oral once PRN Blood Glucose LESS THAN 70 milliGRAM(s)/deciliter  glucagon  Injectable 1 milliGRAM(s) IntraMuscular once PRN Glucose LESS THAN 70 milligrams/deciliter  traMADol 50 milliGRAM(s) Oral every 8 hours PRN Pain       Allergies    No Known Allergies    Intolerances      Vital Signs Last 24 Hrs  T(C): 36.6 (06 Oct 2019 10:47), Max: 36.7 (06 Oct 2019 04:38)  T(F): 97.9 (06 Oct 2019 10:47), Max: 98.1 (06 Oct 2019 04:38)  HR: 80 (06 Oct 2019 17:33) (73 - 81)  BP: 146/71 (06 Oct 2019 17:33) (111/74 - 146/71)  BP(mean): --  RR: 18 (06 Oct 2019 17:33) (18 - 18)  SpO2: 94% (06 Oct 2019 17:33) (94% - 100%)  CAPILLARY BLOOD GLUCOSE      POCT Blood Glucose.: 120 mg/dL (06 Oct 2019 17:28)  POCT Blood Glucose.: 80 mg/dL (06 Oct 2019 16:50)  POCT Blood Glucose.: 269 mg/dL (06 Oct 2019 11:36)  POCT Blood Glucose.: 206 mg/dL (06 Oct 2019 07:36)      10-05 @ 07:01  -  10-06 @ 07:00  --------------------------------------------------------  IN: 2320 mL / OUT: 1440 mL / NET: 880 mL    10-06 @ 07:01  -  10-06 @ 22:05  --------------------------------------------------------  IN: 840 mL / OUT: 0 mL / NET: 840 mL      Physical Exam:    Daily     Daily   General:  Well appearing, NAD, not cachetic  HEENT:  Nonicteric, PERRLA  CV:  RRR, S1S2   Lungs:  CTA B/L, no wheezes, rales, rhonchi  Abdomen:  Soft, non-tender, no distended, positive BS  Extremities:  improving erythema     no limitationof ROM  Neuro:  AAOx3, non-focal, grossly intact                                                                                                                                                                                                                                                                                                LABS:                               11.4   12.56 )-----------( 344      ( 06 Oct 2019 09:04 )             35.1                      10-06    138  |  97  |  26<H>  ----------------------------<  209<H>  4.2   |  27  |  1.23    Ca    9.1      06 Oct 2019 06:33                         RADIOLOGY & ADDITIONAL TESTS         I personally reviewed: [  ]EKG   [  ]CXR    [  ] CT      A/P:         Discussed with :     Derek consultants' Notes   Time spent :

## 2019-10-06 NOTE — CONSULT NOTE ADULT - ASSESSMENT
65 yo M presents with left foot pain and erythema   - vitals are stable, WBC 12  - minimal left dorsal foot erythema, no open lesions. Erythema has been improving with abx   - continue abx per ID recs   - patient can follow up with Dr. Chatman in office this week after discharge (call [phone])  - weight bear as tolerated to the left foot in post-op shoe   - d/w attending 65 yo M presents with left foot pain and erythema   - vitals are stable, WBC 12  - minimal left dorsal foot erythema, no open lesions. Erythema has been improving with abx   - continue abx per ID recs   - patient can follow up with Dr. Chatman in office this week after discharge (call [phone])  - weight bear as tolerated to the left foot in post-op shoe   - please re-consult as needed   - d/w attending

## 2019-10-06 NOTE — PROGRESS NOTE ADULT - SUBJECTIVE AND OBJECTIVE BOX
CC: f/u for  cellulitis left leg  Patient reports  his leg is still swollen  REVIEW OF SYSTEMS:  All other review of systems negative (Comprehensive ROS)    Antimicrobials Day #  :  ceFAZolin   IVPB 1000 milliGRAM(s) IV Intermittent every 8 hours    Other Medications Reviewed    T(F): 97.9 (10-06-19 @ 10:47), Max: 98.1 (10-06-19 @ 04:38)  HR: 81 (10-06-19 @ 10:47)  BP: 111/74 (10-06-19 @ 10:47)  RR: 18 (10-06-19 @ 10:47)  SpO2: 94% (10-06-19 @ 10:47)  Wt(kg): --    PHYSICAL EXAM:  General: alert, no acute distress  Eyes:  anicteric, no conjunctival injection, no discharge  Oropharynx: no lesions or injection 	  Neck: supple, without adenopathy  Lungs: clear to auscultation  Heart: regular rate and rhythm; no murmur, rubs or gallops  Abdomen: soft, nondistended, nontender, without mass or organomegaly  Skin: no lesions  Extremities: no clubbing, cyanosis,. mild lle edema, leg redenss resolved. left foot redness near gone  Neurologic: alert, oriented, moves all extremities    LAB RESULTS:                        11.4   12.56 )-----------( 344      ( 06 Oct 2019 09:04 )             35.1     10-06    138  |  97  |  26<H>  ----------------------------<  209<H>  4.2   |  27  |  1.23    Ca    9.1      06 Oct 2019 06:33          MICROBIOLOGY:  RECENT CULTURES:  10-04 @ 02:10 .Blood     No growth to date.      10-04 @ 02:09 .Blood     No growth to date.          RADIOLOGY REVIEWED:    < from: [facility] Duplex Lower Ext Vein Scan, Left (10.03.19 @ 23:25) >  XAM:  DUPLEX EXT VEINS LOWER LT                            PROCEDURE DATE:  10/03/2019            INTERPRETATION:  CLINICAL INFORMATION: Left lower extremity swelling and   redness, pain    COMPARISON: Right lower extremity venous ultrasound 9/2/2019.    TECHNIQUE: Duplex sonography of the LEFT LOWER extremity veins with color   and spectral Doppler, with and without compression.      FINDINGS:    There is normal compressibility of the left common femoral, femoral and   popliteal veins.     The contralateral common femoral vein is patent.    Doppler examination shows normal spontaneous and phasic flow.    No calf vein thrombosis is detected.    IMPRESSION:     No evidence of left lower extremity deep venous thrombosis.      < end of copied text >    < from: Xray Ankle Complete 3 Views, Left (10.06.19 @ 11:12) >    EXAM:  FOOT 2VIEWS LT                          EXAM:  ANKLE LEFT (MINIMUM 3 VIEWS)                            PROCEDURE DATE:  10/06/2019            INTERPRETATION:  CLINICAL INFORMATION: Pain and bruising on dorsal aspect   of left ankle.    TECHNIQUE: 3 views of the left ankle, 2 views of the left foot.    COMPARISON: None.    FINDINGS/  IMPRESSION:   Irregular density seen only on one projection (medial to talus - see   arrow) may be external to the patient. Recommend clinical correlation or  repeat radiographs with additional views if there is persistent concern   for fracture.     There is otherwise no definite fracture identified.  The joint spaces are preserved.   Plantar enthesopathy. Vascular calcifications. No radiopaque foreign   body.      < end of copied text >  Assessment:  Patient had recent hematoma to left thigh, chf exacerbation, returns with cellulitis LLE and maybe some ecchymosis of the left foot  Plan:  can change to po keflex for another week  elevate leg  podiatry f/u

## 2019-10-06 NOTE — DISCHARGE NOTE PROVIDER - PROVIDER TOKENS
PROVIDER:[TOKEN:[[id number]]],FREE:[LAST:[Compa],FIRST:[Zuleyma],PHONE:[phone]],FAX:[(   )    -],ADDRESS:[[address] [place] [id number]],FOLLOWUP:[1 week]]

## 2019-10-06 NOTE — PROGRESS NOTE ADULT - ASSESSMENT
64 year old male with a history of chronic AF, CAD S/P stents, CHF now presents with painful swelling ecchymosis as well as erythema in the left foot calcaneal area as well as the left foot plantar area.  The patient was started on Unasyn in the ER  He reports that the foot is feeling better today and will attempt to walk.  Potassium now >4.0  Appreciate ID input  CAD with chronic AF - stable on the current regimen

## 2019-10-06 NOTE — DISCHARGE NOTE PROVIDER - CARE PROVIDER_API CALL
Dennis Kumar (MD)  Family Medicine  [address], [address] [place] [address]  Phone: [phone]  Fax: [phone]  Follow Up Time:     Zuleyma Chatman  [address] [facility] [address]  Phone: [phone]  Fax: (   )    -  Follow Up Time: 1 week

## 2019-10-06 NOTE — PROGRESS NOTE ADULT - ASSESSMENT
64M with PMHx of CAD (post-three stents), HTN, HLD, T2DM (complicated by peripheral neuropathy), afib on AC presenting, LLE hematoma now admitted with LLE cellulitis     Problem/Plan - 1:  ·  Problem: cellulitis : change to po abx upon dc   f/u xray of foot and ankle   Problem/Plan - 2:  ·  Problem: CAD in native artery.  Plan:  Patient last had stent placed in 2016, thus is no longer taking brilinta. He denies any chest pain at this time.   cont cardiac meds     Problem/Plan - 3:  ·  Problem: Type 2 diabetes mellitus.  Plan:  cont meds and monitor     Problem/Plan - 4:  ·  Problem: BARRY on CKD (chronic kidney disease). Plan:  monitor and f./u with renla     Problem/Plan - 5:  ·  Problem: Afib. Plan: cont AC and rate control   Patient was planned for repeat cardiac evaluation on 10/17/2019 for potential cardioversion since last hospitalization found LV smoke  which precluded potential DCCV.    Problem/Plan - 6:  Problem: HLD (hyperlipidemia).Plan: cont meds     Problem/Plan - 7:  ·  Problem: HTN (hypertension). Plan:  cont meds and monitor     Problem/Plan - 8:  ·  Problem: Prophylactic measure.  Plan: VTE ppx: continue xarelto  Activity: OOB with assistance  Diet: DASH, consistent carb.

## 2019-10-06 NOTE — DISCHARGE NOTE PROVIDER - NSDCCPCAREPLAN_GEN_ALL_CORE_FT
PRINCIPAL DISCHARGE DIAGNOSIS  Diagnosis: Cellulitis  Assessment and Plan of Treatment: Take all of your antibiotics as ordered.  Call your Health Care Provider within two days of arriving home to make a follow up appointment within one week.  If the affected cellulitic area increases in redness, warmth, pain or swelling call your Health Care Provider.  If you develop fever, chills, and/or malaise, call your Health Care Provider.        SECONDARY DISCHARGE DIAGNOSES  Diagnosis: Hypokalemia  Assessment and Plan of Treatment: Stable at baseline    Diagnosis: Hyperglycemia  Assessment and Plan of Treatment: WDL follow up with Endocrinology outpatient

## 2019-10-09 LAB
CULTURE RESULTS: SIGNIFICANT CHANGE UP
CULTURE RESULTS: SIGNIFICANT CHANGE UP
SPECIMEN SOURCE: SIGNIFICANT CHANGE UP
SPECIMEN SOURCE: SIGNIFICANT CHANGE UP

## 2019-10-17 ENCOUNTER — APPOINTMENT (OUTPATIENT)
Dept: CV DIAGNOSITCS | Facility: HOSPITAL | Age: 64
End: 2019-10-17

## 2019-10-17 PROBLEM — S80.10XA: Chronic | Status: ACTIVE | Noted: 2019-10-04

## 2020-01-03 ENCOUNTER — APPOINTMENT (OUTPATIENT)
Dept: CV DIAGNOSITCS | Facility: HOSPITAL | Age: 65
End: 2020-01-03

## 2020-01-07 NOTE — ED ADULT NURSE NOTE - CHIEF COMPLAINT QUOTE
Saw your pt today  redness to left foot/pain to site  atraumatic/took tramadol last night with relief

## 2020-01-15 NOTE — PATIENT PROFILE ADULT - EQUIPMENT CURRENTLY USED AT HOME
Procedure: hardware removal with revision Lapidus bunionectomy left foot. Release/possible neurectomy of the deep peroneal nerve dorsal left midfoot.     Chief Complaint/Diagnosis: painful hardware left foot and recurrent bunion. 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         Facility-Administered Medications Prior to Admission   Medication    cyanocobalamin injection 1,000 mcg    sodium chloride 0.9% flush 10 mL           PTA Medications   Medication Sig    acetaminophen (TYLENOL) 500 MG tablet Take 1,000 mg by mouth 2 (two) times daily as needed for Pain.    ALPRAZolam (XANAX) 0.25 MG tablet Take 1 tablet (0.25 mg total) by mouth 2 (two) times daily as needed for Anxiety.    aspirin (ECOTRIN) 81 MG EC tablet Take 81 mg by mouth once daily.      atorvastatin (LIPITOR) 40 MG tablet Take 1 tablet (40 mg total) by mouth once daily.    cyanocobalamin 1,000 mcg/mL injection INJECT 1ML INTRAMUSCULARLY WEEKLY FOR 4 WEEKS, THEN 1ML MONTHLY    diltiaZEM (CARTIA XT) 240 MG 24 hr capsule Take 1 capsule (240 mg total) by mouth once daily.    gabapentin (NEURONTIN) 300 MG capsule Take 2 capsules (600 mg total) by mouth 2 (two) times daily.    hydroCHLOROthiazide (HYDRODIURIL) 25 MG tablet Take 1 tablet (25 mg total) by mouth once daily. (Patient taking differently: Take 50 mg by mouth once daily. )    INVELTYS 1 % DrpS INSTILL 1 DROP INTO THE RIGHT EYE THREE TIMES A DAY    latanoprost 0.005 % ophthalmic solution 1 drop every evening.    losartan (COZAAR) 100 MG tablet TAKE 1/2 TABLET EVERY DAY    multivitamin (ONE DAILY MULTIVITAMIN) per tablet Take 1 tablet by mouth once daily.    omeprazole (PRILOSEC) 20 MG capsule Take 1 capsule (20 mg total) by mouth once daily.    oxybutynin (DITROPAN) 5 MG Tab TAKE 1 TABLET (5 MG TOTAL) BY MOUTH ONCE DAILY.    pramipexole (MIRAPEX) 0.5 MG tablet Take 1 tablet (0.5 mg total) by mouth every evening.    timolol maleate 0.5% (TIMOPTIC) 0.5 % Drop Place  1 drop into both eyes once daily.    warfarin (COUMADIN) 6 MG tablet TAKE 1 TABLET EVERY DAY               Review of patient's allergies indicates:   Allergen Reactions    Propofol analogues         Used for her Colonoscopy.  Extended delirium.  Advised not to take it again.      Adhesive      Penicillins      Sulfa (sulfonamide antibiotics)      Compazine [prochlorperazine edisylate] Anxiety              Past Medical History:   Diagnosis Date    Anxiety      Atrial fibrillation      Blind right eye      Bradycardia      Cancer       skin cancer left side of face under eye    Hyperlipidemia      Hypertension      PVC (premature ventricular contraction)      RLS (restless legs syndrome)      Sleep apnea       Does not use CPAP machine.            Past Surgical History:   Procedure Laterality Date    ADENOIDECTOMY        AKIN OSTEOTOMY Left 10/31/2018     Procedure: OSTEOTOMY, AKIN;  Surgeon: Rudolph Cardozo DPM;  Location: [facility] OR;  Service: Podiatry;  Laterality: Left;    APPENDECTOMY        BREAST BIOPSY Left       x3    BREAST SURGERY Left       breast biopsy x3    CATARACT EXTRACTION BILATERAL W/ ANTERIOR VITRECTOMY        ENDOSCOPIC GASTROCNEMIUS RECESSION Left 10/31/2018     Procedure: RECESSION, GASTROCNEMIUS, ENDOSCOPIC;  Surgeon: Rudolph Cardozo DPM;  Location: [facility] OR;  Service: Podiatry;  Laterality: Left;    EYE SURGERY Bilateral       cataracts extraction    EYE SURGERY Right       drainage tube (glaucoma)    FOOT SURGERY Left       lesion removed from dorsal area    FRACTURE SURGERY Left       arm    HAND TENDON SURGERY Left      HYSTERECTOMY        LAPIDUS BUNIONECTOMY Left 10/31/2018     Procedure: BUNIONECTOMY, LAPIDUS;  Surgeon: Rudolph Cardozo DPM;  Location: [facility] OR;  Service: Podiatry;  Laterality: Left;  mini c-arm, Arthrex locking plate (Lydia notified)    LAPIDUS BUNIONECTOMY Left 10/31/2018     Dr. Cardozo    Lymph Gland Removed  Right       groin  (performed by Dr. Vergara)    OOPHORECTOMY        ORIF FOREARM FRACTURE Left      PALATE SURGERY         Lesion removed    TONSILLECTOMY                Family History   Problem Relation Age of Onset    Aneurysm Mother           AAA    Cancer Father           lung cancer    Lung cancer Father      Cirrhosis Father      Cancer Sister           Breast and Brain     Diabetes Sister      Breast cancer Sister      Hypertension Sister      Hyperlipidemia Sister      Brain cancer Sister      Colon polyps Brother      Cancer Brother           lung cancer    Diabetes Brother      Hyperlipidemia Brother      Hypertension Brother      Cancer Brother           bladder cancer    Diabetes Brother      Glaucoma Brother      Heart disease Sister           Heart valve repair    Atrial fibrillation Sister      Diabetes Sister      Heart disease Sister      Heart attack Sister      Bipolar disorder Sister      Depression Sister      Glaucoma Sister      Diabetes Sister      Other Sister           Pituitary tumor    Arthritis Sister      COPD Sister      Heart disease Sister      Kidney disease Sister      Cancer Sister           lung cancer    Diabetes Sister      Lung cancer Sister      Heart attack Sister        Social History            Tobacco Use    Smoking status: Never Smoker    Smokeless tobacco: Never Used   Substance Use Topics    Alcohol use: Yes       Comment: Seldomly drinks alcohol (wine)    Drug use: No         Review of Systems:  Constitutional: no fever or chills, pain well controlled  Eyes: no visual changes  ENT: no nasal congestion or sore throat  Respiratory: no cough or shortness of breath  Cardiovascular: no chest pain or palpitations  Gastrointestinal: no nausea or vomiting, tolerating diet  Genitourinary: no hematuria or dysuria  Integument/Breast: no rash or pruritis  Hematologic/Lymphatic: no easy bruising or lymphadenopathy  Musculoskeletal: no arthralgias or  myalgias  Neurological: no seizures or tremors  Behavioral/Psych: no auditory or visual hallucinations  Endocrine: no heat or cold intolerance     OBJECTIVE:      Vital Signs (Most Recent)     Physical Exam:  General: well developed, no distress, moderately obese  Extremities: warm, well perfused and no cyanosis or edema, or clubbing  Pulses: 2+ and symmetric  Skin: Skin color, texture, turgor normal. No rashes or lesions  Neurologic: Alert and oriented. Thought content appropriate      No pain with MMT or ROM left foot. Left hallux well aligned. 1 MTP left achieves > 45 deg DF.    Left foot achieves 5 deg DF with knee extended and > 10 deg with knee flexed. Right foot achieves 0 deg DF knee extended and 10 deg DF knee flexed.    No pain on palpation of the left leg, no pain with active resisted PF foot.    Mild collapse of the arch with standing bilateral foot.    No localized pain on palpation to the base of the 3rd metatarsal left foot.    Pain on palpation overlying the medial midfoot at the location of hardware plantar 1st tarsometatarsal arthrodesis site left foot.     Positive Tinel sign overlying the dorsal central left midfoot with tingling and pins and needle sensation radiating to the 1st and 2nd toes.    Note medial deviation of toes 2 and 3 left foot when the forefoot is loaded.    Mild recurrence clinically of hallux valgus left foot.            no

## 2020-02-04 ENCOUNTER — NON-APPOINTMENT (OUTPATIENT)
Age: 65
End: 2020-02-04

## 2020-02-04 ENCOUNTER — APPOINTMENT (OUTPATIENT)
Dept: ELECTROPHYSIOLOGY | Facility: CLINIC | Age: 65
End: 2020-02-04
Payer: COMMERCIAL

## 2020-02-04 VITALS
WEIGHT: 150 LBS | BODY MASS INDEX: 24.11 KG/M2 | DIASTOLIC BLOOD PRESSURE: 87 MMHG | HEART RATE: 78 BPM | SYSTOLIC BLOOD PRESSURE: 157 MMHG | HEIGHT: 66 IN | OXYGEN SATURATION: 99 %

## 2020-02-04 DIAGNOSIS — I48.0 PAROXYSMAL ATRIAL FIBRILLATION: ICD-10-CM

## 2020-02-04 PROCEDURE — 93000 ELECTROCARDIOGRAM COMPLETE: CPT

## 2020-02-04 PROCEDURE — 99215 OFFICE O/P EST HI 40 MIN: CPT

## 2020-02-04 RX ORDER — GUAIFENESIN AND CODEINE PHOSPHATE 10; 100 MG/5ML; MG/5ML
100-10 SOLUTION ORAL
Refills: 0 | Status: DISCONTINUED | COMMUNITY
End: 2020-02-04

## 2020-02-04 RX ORDER — TAMSULOSIN HYDROCHLORIDE 0.4 MG/1
0.4 CAPSULE ORAL
Refills: 0 | Status: DISCONTINUED | COMMUNITY
End: 2020-02-04

## 2020-02-04 RX ORDER — SENNOSIDES 8.6 MG TABLETS 8.6 MG/1
TABLET ORAL
Refills: 0 | Status: DISCONTINUED | COMMUNITY
End: 2020-02-04

## 2020-02-04 RX ORDER — FUROSEMIDE 40 MG/1
40 TABLET ORAL DAILY
Qty: 14 | Refills: 0 | Status: ACTIVE | COMMUNITY

## 2020-02-04 RX ORDER — BENZONATATE 200 MG/1
200 CAPSULE ORAL 3 TIMES DAILY
Refills: 0 | Status: DISCONTINUED | COMMUNITY
End: 2020-02-04

## 2020-02-04 RX ORDER — OXYCODONE HYDROCHLORIDE AND ACETAMINOPHEN 5; 325 MG/1; MG/1
5-325 TABLET ORAL
Refills: 0 | Status: DISCONTINUED | COMMUNITY
End: 2020-02-04

## 2020-02-04 NOTE — PHYSICAL EXAM
[Well Groomed] : well groomed [General Appearance - In No Acute Distress] : no acute distress [Normal Conjunctiva] : the conjunctiva exhibited no abnormalities [No Oral Pallor] : no oral pallor [Normal Oral Mucosa] : normal oral mucosa [Eyelids - No Xanthelasma] : the eyelids demonstrated no xanthelasmas [No Oral Cyanosis] : no oral cyanosis [Normal Jugular Venous A Waves Present] : normal jugular venous A waves present [No Jugular Venous Muller A Waves] : no jugular venous muller A waves [Normal Jugular Venous V Waves Present] : normal jugular venous V waves present [Respiration, Rhythm And Depth] : normal respiratory rhythm and effort [Exaggerated Use Of Accessory Muscles For Inspiration] : no accessory muscle use [Auscultation Breath Sounds / Voice Sounds] : lungs were clear to auscultation bilaterally [Heart Sounds] : normal S1 and S2 [Irregularly Irregular] : the rhythm was irregularly irregular [Murmurs] : no murmurs present [Abdomen Tenderness] : non-tender [Abdomen Soft] : soft [Abdomen Mass (___ Cm)] : no abdominal mass palpated [Abnormal Walk] : normal gait [Cyanosis, Localized] : no localized cyanosis [Gait - Sufficient For Exercise Testing] : the gait was sufficient for exercise testing [Nail Clubbing] : no clubbing of the fingernails [Petechial Hemorrhages (___cm)] : no petechial hemorrhages [Skin Color & Pigmentation] : normal skin color and pigmentation [No Venous Stasis] : no venous stasis [Skin Lesions] : no skin lesions [] : no rash [No Xanthoma] : no  xanthoma was observed [No Skin Ulcers] : no skin ulcer [Affect] : the affect was normal [Oriented To Time, Place, And Person] : oriented to person, place, and time [Mood] : the mood was normal [No Anxiety] : not feeling anxious

## 2020-02-05 NOTE — DISCUSSION/SUMMARY
[FreeTextEntry1] : 64 year old man with a history or paroxysmal, now persistent atrial fibrillation in the setting of hypertension, diabetes mellitus, and CAD.  Course has been complicated by YVES showing early JOSE thrombus formation.  We discussed that his symptoms of dyspnea on exertion may be his AF.  I suggested that we do a CV to proved this.  Hopefully the "early thrombus" will have resolved on AC, ie we should do an antecedent YVES.  Given the high likelihood of recurrence I suggest that we load on amio preCV (post YVES).  SHould symptoms improve I would consider for ablation as a more appropriate long term therapy in such a young patient.  We may also consider for JOSE occlusion (Watchman) moving forward.

## 2020-02-05 NOTE — HISTORY OF PRESENT ILLNESS
[FreeTextEntry1] : No palpitations. No chest pain. No lightheadedness/dizziness.  He does admit to dyspnea on exertion.  This is intermittent and sometimes he does better than others.  He has been on Xarelto with no further bleeding.  No TE complication.

## 2020-02-28 ENCOUNTER — APPOINTMENT (OUTPATIENT)
Dept: CV DIAGNOSITCS | Facility: HOSPITAL | Age: 65
End: 2020-02-28

## 2020-02-28 ENCOUNTER — OUTPATIENT (OUTPATIENT)
Dept: OUTPATIENT SERVICES | Facility: HOSPITAL | Age: 65
LOS: 1 days | End: 2020-02-28
Payer: COMMERCIAL

## 2020-02-28 DIAGNOSIS — I25.10 ATHEROSCLEROTIC HEART DISEASE OF NATIVE CORONARY ARTERY WITHOUT ANGINA PECTORIS: ICD-10-CM

## 2020-02-28 PROCEDURE — 93306 TTE W/DOPPLER COMPLETE: CPT

## 2020-02-28 PROCEDURE — 93312 ECHO TRANSESOPHAGEAL: CPT | Mod: 26

## 2020-02-28 PROCEDURE — 93306 TTE W/DOPPLER COMPLETE: CPT | Mod: 26

## 2020-02-28 PROCEDURE — 93312 ECHO TRANSESOPHAGEAL: CPT

## 2020-03-02 RX ORDER — AMIODARONE HYDROCHLORIDE 200 MG/1
200 TABLET ORAL
Qty: 50 | Refills: 1 | Status: ACTIVE | COMMUNITY
Start: 1900-01-01 | End: 1900-01-01

## 2020-03-13 ENCOUNTER — OUTPATIENT (OUTPATIENT)
Dept: OUTPATIENT SERVICES | Facility: HOSPITAL | Age: 65
LOS: 1 days | End: 2020-03-13
Payer: COMMERCIAL

## 2020-03-13 VITALS
OXYGEN SATURATION: 99 % | HEART RATE: 70 BPM | SYSTOLIC BLOOD PRESSURE: 148 MMHG | HEIGHT: 66 IN | WEIGHT: 147.93 LBS | RESPIRATION RATE: 17 BRPM | DIASTOLIC BLOOD PRESSURE: 70 MMHG | TEMPERATURE: 98 F

## 2020-03-13 DIAGNOSIS — I48.91 UNSPECIFIED ATRIAL FIBRILLATION: ICD-10-CM

## 2020-03-13 LAB
ANION GAP SERPL CALC-SCNC: 14 MMOL/L — SIGNIFICANT CHANGE UP (ref 5–17)
APTT BLD: 53.5 SEC — HIGH (ref 27.5–36.3)
BASOPHILS # BLD AUTO: 0.08 K/UL — SIGNIFICANT CHANGE UP (ref 0–0.2)
BASOPHILS NFR BLD AUTO: 0.6 % — SIGNIFICANT CHANGE UP (ref 0–2)
BUN SERPL-MCNC: 23 MG/DL — SIGNIFICANT CHANGE UP (ref 7–23)
CALCIUM SERPL-MCNC: 10.3 MG/DL — SIGNIFICANT CHANGE UP (ref 8.4–10.5)
CHLORIDE SERPL-SCNC: 101 MMOL/L — SIGNIFICANT CHANGE UP (ref 96–108)
CO2 SERPL-SCNC: 27 MMOL/L — SIGNIFICANT CHANGE UP (ref 22–31)
CREAT SERPL-MCNC: 1.58 MG/DL — HIGH (ref 0.5–1.3)
EOSINOPHIL # BLD AUTO: 0.45 K/UL — SIGNIFICANT CHANGE UP (ref 0–0.5)
EOSINOPHIL NFR BLD AUTO: 3.5 % — SIGNIFICANT CHANGE UP (ref 0–6)
GLUCOSE BLDC GLUCOMTR-MCNC: 66 MG/DL — LOW (ref 70–99)
GLUCOSE BLDC GLUCOMTR-MCNC: 84 MG/DL — SIGNIFICANT CHANGE UP (ref 70–99)
GLUCOSE BLDC GLUCOMTR-MCNC: 98 MG/DL — SIGNIFICANT CHANGE UP (ref 70–99)
GLUCOSE SERPL-MCNC: 72 MG/DL — SIGNIFICANT CHANGE UP (ref 70–99)
HCT VFR BLD CALC: 49 % — SIGNIFICANT CHANGE UP (ref 39–50)
HGB BLD-MCNC: 16 G/DL — SIGNIFICANT CHANGE UP (ref 13–17)
IMM GRANULOCYTES NFR BLD AUTO: 0.3 % — SIGNIFICANT CHANGE UP (ref 0–1.5)
INR BLD: 3.66 RATIO — HIGH (ref 0.88–1.16)
LYMPHOCYTES # BLD AUTO: 1.79 K/UL — SIGNIFICANT CHANGE UP (ref 1–3.3)
LYMPHOCYTES # BLD AUTO: 14 % — SIGNIFICANT CHANGE UP (ref 13–44)
MCHC RBC-ENTMCNC: 28.8 PG — SIGNIFICANT CHANGE UP (ref 27–34)
MCHC RBC-ENTMCNC: 32.7 GM/DL — SIGNIFICANT CHANGE UP (ref 32–36)
MCV RBC AUTO: 88.3 FL — SIGNIFICANT CHANGE UP (ref 80–100)
MONOCYTES # BLD AUTO: 0.72 K/UL — SIGNIFICANT CHANGE UP (ref 0–0.9)
MONOCYTES NFR BLD AUTO: 5.6 % — SIGNIFICANT CHANGE UP (ref 2–14)
NEUTROPHILS # BLD AUTO: 9.68 K/UL — HIGH (ref 1.8–7.4)
NEUTROPHILS NFR BLD AUTO: 76 % — SIGNIFICANT CHANGE UP (ref 43–77)
NRBC # BLD: 0 /100 WBCS — SIGNIFICANT CHANGE UP (ref 0–0)
PLATELET # BLD AUTO: 290 K/UL — SIGNIFICANT CHANGE UP (ref 150–400)
POTASSIUM SERPL-MCNC: 4 MMOL/L — SIGNIFICANT CHANGE UP (ref 3.5–5.3)
POTASSIUM SERPL-SCNC: 4 MMOL/L — SIGNIFICANT CHANGE UP (ref 3.5–5.3)
PROTHROM AB SERPL-ACNC: 43.4 SEC — HIGH (ref 10–12.9)
RBC # BLD: 5.55 M/UL — SIGNIFICANT CHANGE UP (ref 4.2–5.8)
RBC # FLD: 13.5 % — SIGNIFICANT CHANGE UP (ref 10.3–14.5)
SODIUM SERPL-SCNC: 142 MMOL/L — SIGNIFICANT CHANGE UP (ref 135–145)
WBC # BLD: 12.76 K/UL — HIGH (ref 3.8–10.5)
WBC # FLD AUTO: 12.76 K/UL — HIGH (ref 3.8–10.5)

## 2020-03-13 PROCEDURE — 93005 ELECTROCARDIOGRAM TRACING: CPT

## 2020-03-13 PROCEDURE — 85730 THROMBOPLASTIN TIME PARTIAL: CPT

## 2020-03-13 PROCEDURE — 82962 GLUCOSE BLOOD TEST: CPT

## 2020-03-13 PROCEDURE — 85027 COMPLETE CBC AUTOMATED: CPT

## 2020-03-13 PROCEDURE — 92960 CARDIOVERSION ELECTRIC EXT: CPT

## 2020-03-13 PROCEDURE — 85610 PROTHROMBIN TIME: CPT

## 2020-03-13 PROCEDURE — 80048 BASIC METABOLIC PNL TOTAL CA: CPT

## 2020-03-13 PROCEDURE — 93010 ELECTROCARDIOGRAM REPORT: CPT

## 2020-03-13 RX ORDER — ENOXAPARIN SODIUM 100 MG/ML
20 INJECTION SUBCUTANEOUS
Qty: 0 | Refills: 0 | DISCHARGE

## 2020-03-13 NOTE — H&P CARDIOLOGY - PMH
Afib    CAD in native artery    CHF (congestive heart failure)    Essential hypertension, hypertension with unspecified goal    Former smoker    Hematoma of leg    Hyperlipidemia, unspecified hyperlipidemia type    Stented coronary artery    Type 2 diabetes mellitus

## 2020-03-13 NOTE — H&P CARDIOLOGY - HISTORY OF PRESENT ILLNESS
66 yo male with PMH of Afib on xarelto, HTN, HLD, T2DM, diastolic CHF, CAD s/p stents, peripheral edema, anemia, LLE cellulitis, psoas muscle hematoma, YVES showing early JOSE thrombus formation had YVES on 2/28 and saw Dr. Barnett and was told he needed Cardioversion for his on-going Afib. Denies CP, SOB, palpitations, fever, chills, nausea, vomiting  dysuria or GI symptoms.    No implantable devices  **Last dose of Xarelto today 3am    2/28/20 YVES:  EF 53%  Thickened mitral valve. Mild-moderate mitral  regurgitation.  2. Normal left atrium.  LA volume index = 31 cc/m2. Very  Dense Spontaneous echo contrast (SMOKE) seen in apex.  Definity was used and took several minutes to penetrate  smoke. No formed thrombus seen.  Lowvelocity in JOSE  14cm/sec.  At 0 degree  it  measures 19 mm ostium x 23 mm length.  A 45 degree it measures 13 mm ostium by 23 mm length  At 90 degree it measures  15 mm ostium by  20 mm length  At 135 degree it measures 20 mm ostium by 20 mm length  3. Low normal left ventricular systolic function.  4. Normal right ventricular size and function.  5. Estimated right ventricular systolic pressure equals 27  mm Hg, assuming right atrial pressure equals 8 mm Hg,  consistent with normal pulmonary pressures.  6. Normal tricuspid valve. Mild tricuspid regurgitation. 64 yo male with PMH of Afib on Xarelto, HTN, HLD, T2DM, diastolic CHF, CAD s/p stents, peripheral edema, anemia, LLE cellulitis, psoas muscle hematoma, (YVES on 9/4/19 showed early thrombus seen in the appendage) had repeat YVES on 2/28 showing no thrombus (likely resolved from Xarelto) and pt saw Dr. Barnett and was told he needed a Cardioversion for his on-going Afib. Denies CP, SOB, palpitations, fever, chills, nausea, vomiting  dysuria or GI symptoms.    No implantable devices  **Last dose of Xarelto today 3am    2/28/20 YVES:  EF 53%  Thickened mitral valve. Mild-moderate mitral  regurgitation.  2. Normal left atrium.  LA volume index = 31 cc/m2. Very  Dense Spontaneous echo contrast (SMOKE) seen in apex.  Definity was used and took several minutes to penetrate  smoke. No formed thrombus seen.  Lowvelocity in JOSE  14cm/sec.  At 0 degree  it  measures 19 mm ostium x 23 mm length.  A 45 degree it measures 13 mm ostium by 23 mm length  At 90 degree it measures  15 mm ostium by  20 mm length  At 135 degree it measures 20 mm ostium by 20 mm length  3. Low normal left ventricular systolic function.  4. Normal right ventricular size and function.  5. Estimated right ventricular systolic pressure equals 27  mm Hg, assuming right atrial pressure equals 8 mm Hg,  consistent with normal pulmonary pressures.  6. Normal tricuspid valve. Mild tricuspid regurgitation.

## 2020-06-19 NOTE — ED PROVIDER NOTE - NS_ACPWITHSCRIBE_ED_ALL_ED
Hydroxyzine (Atarax) 25 mg    Last office visit 6/19/19.    Upcoming visit 7/8/20 (mwv).    Last refill 6/10/20.    Refills provided.  Additional refills will be provided at upcoming appt.   "I personally performed the services described in the documentation  recorded by the scribe in my presence, and it accurately and completely records my words and action.”

## 2020-11-26 NOTE — ED ADULT NURSE NOTE - BREATHING
General Surgery Progress Note      Subjective:  No acute events overnight, pain well controlled, no n/v, tolerating diet, ambulating well and voiding freely.    Objective:  Vitals with min/max:  Vital Last Value 24 Hour Range   Temperature 98.8 °F (37.1 °C) (11/26/20 0431) Temp  Min: 97.3 °F (36.3 °C)  Max: 99.7 °F (37.6 °C)   Pulse 83 (11/26/20 0431) Pulse  Min: 69  Max: 116   Respiratory 16 (11/26/20 0431) Resp  Min: 16  Max: 18   Non-Invasive  Blood Pressure 104/65 (11/26/20 0431) BP  Min: 103/53  Max: 118/64   Pulse Oximetry 94 % (11/26/20 0431) SpO2  Min: 93 %  Max: 100 %   Arterial   Blood Pressure   No data recorded       Intake/Output Summary (Last 24 hours) at 11/26/2020 0717  Last data filed at 11/26/2020 0500  Gross per 24 hour   Intake 1525.68 ml   Output 870 ml   Net 655.68 ml       Physical Exam:  Gen: AO x 3, NAD  CV: RRR  Pulm: NLB  Abd: Soft, appropriately tender, nondistended, incisions covered with surgical glue  Ext: Atraumatic, distal pulses intact  Pulses: Distal pulses intact    Recent Labs   Lab 11/26/20  0548 11/25/20  1020 11/24/20  2119   WBC 9.0 8.0 7.5   RBC 4.46 4.30 4.79   HGB 13.3 13.1 14.3   HCT 41.3 39.1 43.5    295 340     Recent Labs   Lab 11/26/20  0548 11/25/20  1020 11/24/20 2119   SODIUM 139 139 137   CHLORIDE 105 105 102   CO2 32 28 29   BUN 9 12 14   CREATININE 0.57 0.54 0.52   CALCIUM 8.9 9.1 9.8   ALBUMIN 3.3* 3.3* 4.0   BILIRUBIN 0.4 0.4 0.2   ALKPT 120* 119* 114   GPT 87* 104* 28   AST 55* 113* 24   GLUCOSE 124* 93 99     Recent Labs   Lab 11/25/20  1020   PT 10.1   INR 1.0       Assessment/Plan:  56 yo female who is POD 1 s/p laparoscopic cholecystectomy, she is having and uncomplicated postoperative course.       - Regular diet  - Pain and nausea control PRN  - Encourage ambulation  - Discharge home today      Discussed with Dr Evangelista Gomez MD  PGY 2 Surgery  [facility]   spontaneous/unlabored

## 2021-02-04 NOTE — PROVIDER CONTACT NOTE (OTHER) - SITUATION
Referral received from Dr. Murillo to contact patient regarding his SSDI and Medicare questions.  I spoke with Miller briefly today and he requested I call back next week.        ALEXYS Mcnair, [facility]  Liver Transplant   Phone [phone]  Pager [phone]    Pt hypoglycemic; FS 66 mg/dL

## 2021-06-10 NOTE — DISCHARGE NOTE PROVIDER - NSDCHC_MEDRECSTATUS_GEN_ALL_CORE
Admission Reconciliation is Completed  Discharge Reconciliation is Not Complete Admission Reconciliation is Completed  Discharge Reconciliation is Completed Principal Discharge DX:	Generalized abdominal pain

## 2021-08-28 NOTE — PATIENT PROFILE ADULT - NSPROEXTENSIONSOFSELF_GEN_A_NUR
2256- EDC 2021 40.2 weeks presents to L&D for IOL for postdates.  TOCO/FM applied. IV started, labs drawn and admit profile completed.  2320-Anu Martin CNM at bedside to see pt. SVE=1/thick/  Orders rcvd to place cytotec.  0007-cytotec placed.  0220-Pt had 5 minutes prolonged decel into 60's. All interventions completed and terbutaline given. Anu Martin CNM called to come to bedside. FHT back up to baseline.  0700-report given to dayshift RN         eyeglasses

## 2021-09-05 ENCOUNTER — EMERGENCY (EMERGENCY)
Facility: HOSPITAL | Age: 66
LOS: 1 days | Discharge: HOME | End: 2021-09-05
Attending: EMERGENCY MEDICINE
Payer: COMMERCIAL

## 2021-09-05 VITALS
OXYGEN SATURATION: 99 % | WEIGHT: 149.91 LBS | DIASTOLIC BLOOD PRESSURE: 81 MMHG | RESPIRATION RATE: 18 BRPM | SYSTOLIC BLOOD PRESSURE: 215 MMHG | HEART RATE: 87 BPM | HEIGHT: 66 IN | TEMPERATURE: 98 F

## 2021-09-05 LAB
ALBUMIN SERPL ELPH-MCNC: 4.5 G/DL — SIGNIFICANT CHANGE UP (ref 3.3–5)
ALP SERPL-CCNC: 126 U/L — HIGH (ref 40–120)
ALT FLD-CCNC: 22 U/L — SIGNIFICANT CHANGE UP (ref 10–45)
ANION GAP SERPL CALC-SCNC: 13 MMOL/L — SIGNIFICANT CHANGE UP (ref 5–17)
AST SERPL-CCNC: 27 U/L — SIGNIFICANT CHANGE UP (ref 10–40)
BASOPHILS # BLD AUTO: 0.03 K/UL — SIGNIFICANT CHANGE UP (ref 0–0.2)
BASOPHILS NFR BLD AUTO: 0.3 % — SIGNIFICANT CHANGE UP (ref 0–2)
BILIRUB SERPL-MCNC: 1.1 MG/DL — SIGNIFICANT CHANGE UP (ref 0.2–1.2)
BUN SERPL-MCNC: 28 MG/DL — HIGH (ref 7–23)
CALCIUM SERPL-MCNC: 10.5 MG/DL — SIGNIFICANT CHANGE UP (ref 8.4–10.5)
CHLORIDE SERPL-SCNC: 101 MMOL/L — SIGNIFICANT CHANGE UP (ref 96–108)
CO2 SERPL-SCNC: 24 MMOL/L — SIGNIFICANT CHANGE UP (ref 22–31)
CREAT SERPL-MCNC: 1.09 MG/DL — SIGNIFICANT CHANGE UP (ref 0.5–1.3)
EOSINOPHIL # BLD AUTO: 0.1 K/UL — SIGNIFICANT CHANGE UP (ref 0–0.5)
EOSINOPHIL NFR BLD AUTO: 1.1 % — SIGNIFICANT CHANGE UP (ref 0–6)
GLUCOSE SERPL-MCNC: 177 MG/DL — HIGH (ref 70–99)
HCT VFR BLD CALC: 46.8 % — SIGNIFICANT CHANGE UP (ref 39–50)
HGB BLD-MCNC: 16.3 G/DL — SIGNIFICANT CHANGE UP (ref 13–17)
IMM GRANULOCYTES NFR BLD AUTO: 0.3 % — SIGNIFICANT CHANGE UP (ref 0–1.5)
LYMPHOCYTES # BLD AUTO: 2.04 K/UL — SIGNIFICANT CHANGE UP (ref 1–3.3)
LYMPHOCYTES # BLD AUTO: 21.6 % — SIGNIFICANT CHANGE UP (ref 13–44)
MAGNESIUM SERPL-MCNC: 2.2 MG/DL — SIGNIFICANT CHANGE UP (ref 1.6–2.6)
MCHC RBC-ENTMCNC: 31 PG — SIGNIFICANT CHANGE UP (ref 27–34)
MCHC RBC-ENTMCNC: 34.8 GM/DL — SIGNIFICANT CHANGE UP (ref 32–36)
MCV RBC AUTO: 89.1 FL — SIGNIFICANT CHANGE UP (ref 80–100)
MONOCYTES # BLD AUTO: 0.78 K/UL — SIGNIFICANT CHANGE UP (ref 0–0.9)
MONOCYTES NFR BLD AUTO: 8.3 % — SIGNIFICANT CHANGE UP (ref 2–14)
NEUTROPHILS # BLD AUTO: 6.45 K/UL — SIGNIFICANT CHANGE UP (ref 1.8–7.4)
NEUTROPHILS NFR BLD AUTO: 68.4 % — SIGNIFICANT CHANGE UP (ref 43–77)
NRBC # BLD: 0 /100 WBCS — SIGNIFICANT CHANGE UP (ref 0–0)
NT-PROBNP SERPL-SCNC: 765 PG/ML — HIGH (ref 0–300)
PLATELET # BLD AUTO: 221 K/UL — SIGNIFICANT CHANGE UP (ref 150–400)
POTASSIUM SERPL-MCNC: 4.2 MMOL/L — SIGNIFICANT CHANGE UP (ref 3.5–5.3)
POTASSIUM SERPL-SCNC: 4.2 MMOL/L — SIGNIFICANT CHANGE UP (ref 3.5–5.3)
PROT SERPL-MCNC: 7.7 G/DL — SIGNIFICANT CHANGE UP (ref 6–8.3)
RBC # BLD: 5.25 M/UL — SIGNIFICANT CHANGE UP (ref 4.2–5.8)
RBC # FLD: 12.8 % — SIGNIFICANT CHANGE UP (ref 10.3–14.5)
SODIUM SERPL-SCNC: 138 MMOL/L — SIGNIFICANT CHANGE UP (ref 135–145)
TROPONIN T, HIGH SENSITIVITY RESULT: 16 NG/L — SIGNIFICANT CHANGE UP (ref 0–51)
TROPONIN T, HIGH SENSITIVITY RESULT: 18 NG/L — SIGNIFICANT CHANGE UP (ref 0–51)
WBC # BLD: 9.43 K/UL — SIGNIFICANT CHANGE UP (ref 3.8–10.5)
WBC # FLD AUTO: 9.43 K/UL — SIGNIFICANT CHANGE UP (ref 3.8–10.5)

## 2021-09-05 PROCEDURE — 84484 ASSAY OF TROPONIN QUANT: CPT

## 2021-09-05 PROCEDURE — 83880 ASSAY OF NATRIURETIC PEPTIDE: CPT

## 2021-09-05 PROCEDURE — 93005 ELECTROCARDIOGRAM TRACING: CPT | Mod: 76

## 2021-09-05 PROCEDURE — 83735 ASSAY OF MAGNESIUM: CPT

## 2021-09-05 PROCEDURE — 71046 X-RAY EXAM CHEST 2 VIEWS: CPT

## 2021-09-05 PROCEDURE — 71046 X-RAY EXAM CHEST 2 VIEWS: CPT | Mod: 26

## 2021-09-05 PROCEDURE — 85025 COMPLETE CBC W/AUTO DIFF WBC: CPT

## 2021-09-05 PROCEDURE — 99285 EMERGENCY DEPT VISIT HI MDM: CPT | Mod: 25

## 2021-09-05 PROCEDURE — 99284 EMERGENCY DEPT VISIT MOD MDM: CPT | Mod: 25

## 2021-09-05 PROCEDURE — 80053 COMPREHEN METABOLIC PANEL: CPT

## 2021-09-05 PROCEDURE — 93010 ELECTROCARDIOGRAM REPORT: CPT

## 2021-09-05 RX ORDER — ASPIRIN/CALCIUM CARB/MAGNESIUM 324 MG
324 TABLET ORAL ONCE
Refills: 0 | Status: COMPLETED | OUTPATIENT
Start: 2021-09-05 | End: 2021-09-05

## 2021-09-05 RX ORDER — NITROGLYCERIN 6.5 MG
0.4 CAPSULE, EXTENDED RELEASE ORAL
Refills: 0 | Status: DISCONTINUED | OUTPATIENT
Start: 2021-09-05 | End: 2021-09-09

## 2021-09-05 RX ORDER — METOPROLOL TARTRATE 50 MG
25 TABLET ORAL ONCE
Refills: 0 | Status: COMPLETED | OUTPATIENT
Start: 2021-09-05 | End: 2021-09-05

## 2021-09-05 RX ADMIN — Medication 0.4 MILLIGRAM(S): at 21:02

## 2021-09-05 RX ADMIN — Medication 25 MILLIGRAM(S): at 21:23

## 2021-09-05 RX ADMIN — Medication 324 MILLIGRAM(S): at 21:02

## 2021-09-05 NOTE — ED PROVIDER NOTE - ATTENDING CONTRIBUTION TO CARE
Dr. Campbell (Attending Physician)  I performed a history and physical exam of the patient and discussed their management with the resident. I reviewed the resident's note and agree with the documented findings and plan of care. My medical decision making and observations are found above.

## 2021-09-05 NOTE — ED ADULT NURSE NOTE - NSIMPLEMENTINTERV_GEN_ALL_ED
Implemented All Fall Risk Interventions:  [place] to call system. Call bell, personal items and telephone within reach. Instruct patient to call for assistance. Room bathroom lighting operational. Non-slip footwear when patient is off stretcher. Physically safe environment: no spills, clutter or unnecessary equipment. Stretcher in lowest position, wheels locked, appropriate side rails in place. Provide visual cue, wrist band, yellow gown, etc. Monitor gait and stability. Monitor for mental status changes and reorient to person, place, and time. Review medications for side effects contributing to fall risk. Reinforce activity limits and safety measures with patient and family.

## 2021-09-05 NOTE — ED PROVIDER NOTE - IV ALTEPLASE DOOR HIDDEN
s/p radiotherapy, will order PSA to assess for remission, Urology (  )  referral after test .   show

## 2021-09-05 NOTE — ED PROVIDER NOTE - OBJECTIVE STATEMENT
66y F PMHx of Afib on Xarelto, HTN, HLD, T2DM, diastolic CHF, CAD s/p stents on xarelto presenting with one day of palpitations. Patient endorsed symptoms earlier this evening, that has since self-resolved prior to ED arrival. Also states yesterday, was endorsing L-sided lateral chest wall pain that has since self-resolved. Patient states he has not been taking his antihypertensive medication (metoprolol) x2 weeks. Patient currently denies dizziness, headache, visual changes, chest pain, sob, nausea, vomiting, focal weakness/numbness, back pain.

## 2021-09-05 NOTE — ED PROVIDER NOTE - NSFOLLOWUPINSTRUCTIONS_ED_ALL_ED_FT
- Lab and imaging results, if performed, were discussed with you along with your discharge diagnosis    - Follow up with your doctor in 1 week - bring copies of your results with you.     - Return to the ED for any new, worsening, or concerning symptoms to you including new chest pain, shortness of breath, dizziness, nausea or vomiting     - Continue all prescribed medications, please take your blood pressure medication at home to control your blood pressure, this is very important!     - Rest and keep yourself hydrated with fluids - Lab and imaging results, if performed, were discussed with you along with your discharge diagnosis    - Follow up with your doctor in 1 week - bring copies of your results with you.     - Return to the ED for any new, worsening, or concerning symptoms to you including new chest pain, shortness of breath, dizziness, nausea or vomiting     - Continue all prescribed medications, please take your blood pressure medication at home to control your blood pressure, this is very important!    - Rest and keep yourself hydrated with fluids

## 2021-09-05 NOTE — ED PROVIDER NOTE - CLINICAL SUMMARY MEDICAL DECISION MAKING FREE TEXT BOX
Dr. Campbell (Attending Physician)  PT. with ho afib, htn on xarelto and metoprolol, noncompliant with meds for past 2 weeks pw palpitations today now resolved. No chest pain, shortness of breath, diaphoresis, nausea, vomiting. ECG with ischemic changes, elevations in avr and v1, st elevations inferolaterally. Will check labs, cardiac enzymes, BP control and recheck ecg.

## 2021-09-05 NOTE — ED PROVIDER NOTE - PROGRESS NOTE DETAILS
Patricio HEATH (PGY-3): rpt EKG after BP controlled with no e/o MARQUES, pt remains asymptomatic, pending rpt trop, initial 16. Will continue to closely monitor

## 2021-09-05 NOTE — ED PROVIDER NOTE - NS ED ROS FT
CONSTITUTIONAL: No fevers, no chills, no lightheadedness, no dizziness  EYES: no visual changes  CV: No chest pain, + palpitations  RESP: No SOB, no cough  GI: No n/v, no abd pain  : no flank pain  MSK: no back pain, no extremity pain  SKIN: no rashes  NEURO: no headache, no focal weakness, no decreased sensation/paresthesias   PSYCHIATRIC: no known mental health issues

## 2021-09-05 NOTE — ED ADULT NURSE REASSESSMENT NOTE - NS ED NURSE REASSESS COMMENT FT1
Handoff report received from Handoff report received from RUSSELL Kohli. Pt resting comfortably in purple 26. Pt A&O x 4, VSS. Pt denies CP and SOB. Pt denies other needs at this time. Bed in lowest position, side rails up and call bell in reach.

## 2021-09-05 NOTE — ED PROVIDER NOTE - NSICDXFAMILYHX_GEN_ALL_CORE_FT
FAMILY HISTORY:  Family history of heart disease, father  age 71  FH: atrial fibrillation, sister

## 2021-09-05 NOTE — ED ADULT NURSE REASSESSMENT NOTE - NS ED NURSE REASSESS COMMENT FT1
Pt AxOx3, observed sitting up in stretcher conversing with RN without difficulty. Breathing spontaneous and unlabored. Pt updated on plan of care awaiting dispo/ cards follow up. Reports less sensation of "fluttering of the chest".  Maintained on cont. cardiac monitor, a fib noted. No acute distress noted. Call bell within reach.

## 2021-09-05 NOTE — ED ADULT TRIAGE NOTE - CHIEF COMPLAINT QUOTE
fluttering in chest   hx of afib  hasn't taken his xarelto in 2 weeks because he was busy and couldnt make it to pharmacy

## 2021-09-05 NOTE — ED PROVIDER NOTE - NSICDXPASTMEDICALHX_GEN_ALL_CORE_FT
PAST MEDICAL HISTORY:  Afib     CAD in native artery     CHF (congestive heart failure)     Essential hypertension, hypertension with unspecified goal     Former smoker     Hematoma of leg     Hyperlipidemia, unspecified hyperlipidemia type     Stented coronary artery     Type 2 diabetes mellitus

## 2021-09-05 NOTE — ED PROVIDER NOTE - PATIENT PORTAL LINK FT
You can access the FollowMyHealth Patient Portal offered by [facility] by registering at the following website: [email or web address]. By joining [employer]’s FollowMyHealth portal, you will also be able to view your health information using other applications (apps) compatible with our system.

## 2021-09-05 NOTE — ED ADULT NURSE NOTE - OBJECTIVE STATEMENT
66 y old male with PMH of afib, CHF, stents, CAD, and DM2 presents to the ED from home c/o fluttering in chest. Pt reports last night he experienced pain in the L side of anterior chest wall which was relieved with Tylenol. Pt states he was upstairs visiting when he felt "fluttering" in his chest. Pt states "it feels like my afib." Pt reports he has not taken his Metoprolol and Xarelto in 2 weeks because he has not been to the pharmacy. Denies HA, fevers, chills, SOB, CP, abd pain, n/v/d, urinary s/s, weakness. Pt A& O x 4 , ambulatory at baseline. Respirations spontaneous and unlabored. Peripheral pulses strong. Skin warm, dry and intact. CM placed. EKG performed in ED. Bed in lowest position, side rails up and call bell in reach.

## 2021-09-05 NOTE — ED PROVIDER NOTE - PHYSICAL EXAMINATION
Physical Exam:  Gen: NAD, AOx3, non-toxic appearing, able to ambulate without assistance  HEENT: EOMI, normal conjunctiva  Lung: CTAB, no respiratory distress, no wheezes/rhonchi/rales B/L, speaking in full sentences  CV: RRR, no murmurs, rubs or gallops  Abd: soft, NT, ND, no guarding  MSK: no visible deformities, ROM normal in UE/LE, no back pain  Neuro: No focal sensory or motor deficits  Skin: Warm, well perfused, no rash, no leg swelling  Psych: normal affect, calm  Daija Curry D.O. -Resident

## 2021-09-06 VITALS
SYSTOLIC BLOOD PRESSURE: 166 MMHG | DIASTOLIC BLOOD PRESSURE: 67 MMHG | OXYGEN SATURATION: 98 % | TEMPERATURE: 97 F | HEART RATE: 59 BPM | RESPIRATION RATE: 17 BRPM

## 2021-09-06 PROBLEM — I50.9 HEART FAILURE, UNSPECIFIED: Chronic | Status: ACTIVE | Noted: 2020-03-13

## 2021-09-06 PROBLEM — Z95.5 PRESENCE OF CORONARY ANGIOPLASTY IMPLANT AND GRAFT: Chronic | Status: ACTIVE | Noted: 2020-03-13

## 2021-12-14 NOTE — ED ADULT NURSE NOTE - PAIN: PRESENCE, MLM
Patient has seen Rd Noel for venous insufficiency in the past. Please advise.
Patient is requesting a recommendation to see a vascular doctor for his right leg. Patient states he has no symptoms but would like to walk. Please advise.
Referral in epic. Thanks.
Spoke with pt, verified , informed pt that referral has been placed for Dr Karan Gonsalves, provided pt with referral information, pt verbalized understanding.
complains of pain/discomfort

## 2022-02-26 ENCOUNTER — INPATIENT (INPATIENT)
Facility: HOSPITAL | Age: 67
LOS: 2 days | Discharge: HOME HEALTH | DRG: 247 | End: 2022-03-01
Attending: GENERAL ACUTE CARE HOSPITAL | Admitting: GENERAL ACUTE CARE HOSPITAL
Payer: COMMERCIAL

## 2022-02-26 VITALS
HEART RATE: 88 BPM | RESPIRATION RATE: 18 BRPM | HEIGHT: 66 IN | DIASTOLIC BLOOD PRESSURE: 72 MMHG | OXYGEN SATURATION: 95 % | TEMPERATURE: 98 F | WEIGHT: 147.05 LBS | SYSTOLIC BLOOD PRESSURE: 163 MMHG

## 2022-02-26 DIAGNOSIS — E11.9 TYPE 2 DIABETES MELLITUS WITHOUT COMPLICATIONS: ICD-10-CM

## 2022-02-26 DIAGNOSIS — I10 ESSENTIAL (PRIMARY) HYPERTENSION: ICD-10-CM

## 2022-02-26 DIAGNOSIS — Z29.9 ENCOUNTER FOR PROPHYLACTIC MEASURES, UNSPECIFIED: ICD-10-CM

## 2022-02-26 DIAGNOSIS — I48.0 PAROXYSMAL ATRIAL FIBRILLATION: ICD-10-CM

## 2022-02-26 DIAGNOSIS — I21.4 NON-ST ELEVATION (NSTEMI) MYOCARDIAL INFARCTION: ICD-10-CM

## 2022-02-26 DIAGNOSIS — I50.30 UNSPECIFIED DIASTOLIC (CONGESTIVE) HEART FAILURE: ICD-10-CM

## 2022-02-26 LAB
ALBUMIN SERPL ELPH-MCNC: 4.6 G/DL — SIGNIFICANT CHANGE UP (ref 3.3–5)
ALP SERPL-CCNC: 145 U/L — HIGH (ref 40–120)
ALT FLD-CCNC: 17 U/L — SIGNIFICANT CHANGE UP (ref 10–45)
ANION GAP SERPL CALC-SCNC: 15 MMOL/L — SIGNIFICANT CHANGE UP (ref 5–17)
APTT BLD: 43 SEC — HIGH (ref 27.5–35.5)
AST SERPL-CCNC: 30 U/L — SIGNIFICANT CHANGE UP (ref 10–40)
BASOPHILS # BLD AUTO: 0.05 K/UL — SIGNIFICANT CHANGE UP (ref 0–0.2)
BASOPHILS NFR BLD AUTO: 0.4 % — SIGNIFICANT CHANGE UP (ref 0–2)
BILIRUB SERPL-MCNC: 2.2 MG/DL — HIGH (ref 0.2–1.2)
BUN SERPL-MCNC: 15 MG/DL — SIGNIFICANT CHANGE UP (ref 7–23)
CALCIUM SERPL-MCNC: 10.1 MG/DL — SIGNIFICANT CHANGE UP (ref 8.4–10.5)
CHLORIDE SERPL-SCNC: 99 MMOL/L — SIGNIFICANT CHANGE UP (ref 96–108)
CK MB CFR SERPL CALC: 15.8 NG/ML — HIGH (ref 0–6.7)
CO2 SERPL-SCNC: 26 MMOL/L — SIGNIFICANT CHANGE UP (ref 22–31)
CREAT SERPL-MCNC: 1.18 MG/DL — SIGNIFICANT CHANGE UP (ref 0.5–1.3)
EOSINOPHIL # BLD AUTO: 0.07 K/UL — SIGNIFICANT CHANGE UP (ref 0–0.5)
EOSINOPHIL NFR BLD AUTO: 0.6 % — SIGNIFICANT CHANGE UP (ref 0–6)
GLUCOSE BLDC GLUCOMTR-MCNC: 210 MG/DL — HIGH (ref 70–99)
GLUCOSE SERPL-MCNC: 237 MG/DL — HIGH (ref 70–99)
HCT VFR BLD CALC: 48.6 % — SIGNIFICANT CHANGE UP (ref 39–50)
HGB BLD-MCNC: 17.2 G/DL — HIGH (ref 13–17)
IMM GRANULOCYTES NFR BLD AUTO: 0.4 % — SIGNIFICANT CHANGE UP (ref 0–1.5)
INR BLD: 1.82 RATIO — HIGH (ref 0.88–1.16)
LYMPHOCYTES # BLD AUTO: 1.25 K/UL — SIGNIFICANT CHANGE UP (ref 1–3.3)
LYMPHOCYTES # BLD AUTO: 11 % — LOW (ref 13–44)
MCHC RBC-ENTMCNC: 31.4 PG — SIGNIFICANT CHANGE UP (ref 27–34)
MCHC RBC-ENTMCNC: 35.4 GM/DL — SIGNIFICANT CHANGE UP (ref 32–36)
MCV RBC AUTO: 88.7 FL — SIGNIFICANT CHANGE UP (ref 80–100)
MONOCYTES # BLD AUTO: 0.7 K/UL — SIGNIFICANT CHANGE UP (ref 0–0.9)
MONOCYTES NFR BLD AUTO: 6.2 % — SIGNIFICANT CHANGE UP (ref 2–14)
NEUTROPHILS # BLD AUTO: 9.26 K/UL — HIGH (ref 1.8–7.4)
NEUTROPHILS NFR BLD AUTO: 81.4 % — HIGH (ref 43–77)
NRBC # BLD: 0 /100 WBCS — SIGNIFICANT CHANGE UP (ref 0–0)
PLATELET # BLD AUTO: 220 K/UL — SIGNIFICANT CHANGE UP (ref 150–400)
POTASSIUM SERPL-MCNC: 4.3 MMOL/L — SIGNIFICANT CHANGE UP (ref 3.5–5.3)
POTASSIUM SERPL-SCNC: 4.3 MMOL/L — SIGNIFICANT CHANGE UP (ref 3.5–5.3)
PROT SERPL-MCNC: 7.5 G/DL — SIGNIFICANT CHANGE UP (ref 6–8.3)
PROTHROM AB SERPL-ACNC: 22 SEC — HIGH (ref 10.5–13.4)
RBC # BLD: 5.48 M/UL — SIGNIFICANT CHANGE UP (ref 4.2–5.8)
RBC # FLD: 12.7 % — SIGNIFICANT CHANGE UP (ref 10.3–14.5)
SARS-COV-2 RNA SPEC QL NAA+PROBE: SIGNIFICANT CHANGE UP
SODIUM SERPL-SCNC: 140 MMOL/L — SIGNIFICANT CHANGE UP (ref 135–145)
TROPONIN T, HIGH SENSITIVITY RESULT: 195 NG/L — HIGH (ref 0–51)
TROPONIN T, HIGH SENSITIVITY RESULT: 204 NG/L — HIGH (ref 0–51)
WBC # BLD: 11.37 K/UL — HIGH (ref 3.8–10.5)
WBC # FLD AUTO: 11.37 K/UL — HIGH (ref 3.8–10.5)

## 2022-02-26 PROCEDURE — 71045 X-RAY EXAM CHEST 1 VIEW: CPT | Mod: 26,59

## 2022-02-26 PROCEDURE — 93010 ELECTROCARDIOGRAM REPORT: CPT

## 2022-02-26 PROCEDURE — 99223 1ST HOSP IP/OBS HIGH 75: CPT

## 2022-02-26 PROCEDURE — 99285 EMERGENCY DEPT VISIT HI MDM: CPT

## 2022-02-26 RX ORDER — INSULIN GLARGINE 100 [IU]/ML
24 INJECTION, SOLUTION SUBCUTANEOUS AT BEDTIME
Refills: 0 | Status: DISCONTINUED | OUTPATIENT
Start: 2022-02-26 | End: 2022-03-01

## 2022-02-26 RX ORDER — GLUCAGON INJECTION, SOLUTION 0.5 MG/.1ML
1 INJECTION, SOLUTION SUBCUTANEOUS ONCE
Refills: 0 | Status: DISCONTINUED | OUTPATIENT
Start: 2022-02-26 | End: 2022-03-01

## 2022-02-26 RX ORDER — ATORVASTATIN CALCIUM 80 MG/1
80 TABLET, FILM COATED ORAL AT BEDTIME
Refills: 0 | Status: DISCONTINUED | OUTPATIENT
Start: 2022-02-26 | End: 2022-03-01

## 2022-02-26 RX ORDER — LANOLIN ALCOHOL/MO/W.PET/CERES
3 CREAM (GRAM) TOPICAL AT BEDTIME
Refills: 0 | Status: DISCONTINUED | OUTPATIENT
Start: 2022-02-26 | End: 2022-03-01

## 2022-02-26 RX ORDER — ACETAMINOPHEN 500 MG
650 TABLET ORAL EVERY 6 HOURS
Refills: 0 | Status: DISCONTINUED | OUTPATIENT
Start: 2022-02-26 | End: 2022-03-01

## 2022-02-26 RX ORDER — HEPARIN SODIUM 5000 [USP'U]/ML
4100 INJECTION INTRAVENOUS; SUBCUTANEOUS ONCE
Refills: 0 | Status: COMPLETED | OUTPATIENT
Start: 2022-02-26 | End: 2022-02-26

## 2022-02-26 RX ORDER — METOPROLOL TARTRATE 50 MG
3 TABLET ORAL
Qty: 0 | Refills: 0 | DISCHARGE

## 2022-02-26 RX ORDER — ASPIRIN/CALCIUM CARB/MAGNESIUM 324 MG
162 TABLET ORAL ONCE
Refills: 0 | Status: COMPLETED | OUTPATIENT
Start: 2022-02-26 | End: 2022-02-26

## 2022-02-26 RX ORDER — DEXTROSE 50 % IN WATER 50 %
25 SYRINGE (ML) INTRAVENOUS ONCE
Refills: 0 | Status: DISCONTINUED | OUTPATIENT
Start: 2022-02-26 | End: 2022-03-01

## 2022-02-26 RX ORDER — HEPARIN SODIUM 5000 [USP'U]/ML
INJECTION INTRAVENOUS; SUBCUTANEOUS
Qty: 25000 | Refills: 0 | Status: DISCONTINUED | OUTPATIENT
Start: 2022-02-26 | End: 2022-02-27

## 2022-02-26 RX ORDER — ONDANSETRON 8 MG/1
4 TABLET, FILM COATED ORAL EVERY 8 HOURS
Refills: 0 | Status: DISCONTINUED | OUTPATIENT
Start: 2022-02-26 | End: 2022-03-01

## 2022-02-26 RX ORDER — SITAGLIPTIN 50 MG/1
0.5 TABLET, FILM COATED ORAL
Qty: 0 | Refills: 0 | DISCHARGE

## 2022-02-26 RX ORDER — DILTIAZEM HCL 120 MG
180 CAPSULE, EXT RELEASE 24 HR ORAL DAILY
Refills: 0 | Status: DISCONTINUED | OUTPATIENT
Start: 2022-02-26 | End: 2022-03-01

## 2022-02-26 RX ORDER — ASPIRIN/CALCIUM CARB/MAGNESIUM 324 MG
81 TABLET ORAL DAILY
Refills: 0 | Status: DISCONTINUED | OUTPATIENT
Start: 2022-02-26 | End: 2022-03-01

## 2022-02-26 RX ORDER — SODIUM CHLORIDE 9 MG/ML
1000 INJECTION, SOLUTION INTRAVENOUS
Refills: 0 | Status: DISCONTINUED | OUTPATIENT
Start: 2022-02-26 | End: 2022-03-01

## 2022-02-26 RX ORDER — DEXTROSE 50 % IN WATER 50 %
15 SYRINGE (ML) INTRAVENOUS ONCE
Refills: 0 | Status: DISCONTINUED | OUTPATIENT
Start: 2022-02-26 | End: 2022-03-01

## 2022-02-26 RX ORDER — FUROSEMIDE 40 MG
1 TABLET ORAL
Qty: 0 | Refills: 0 | DISCHARGE

## 2022-02-26 RX ORDER — METOPROLOL TARTRATE 50 MG
50 TABLET ORAL DAILY
Refills: 0 | Status: DISCONTINUED | OUTPATIENT
Start: 2022-02-26 | End: 2022-03-01

## 2022-02-26 RX ORDER — INSULIN LISPRO 100/ML
VIAL (ML) SUBCUTANEOUS
Refills: 0 | Status: DISCONTINUED | OUTPATIENT
Start: 2022-02-26 | End: 2022-03-01

## 2022-02-26 RX ADMIN — INSULIN GLARGINE 24 UNIT(S): 100 INJECTION, SOLUTION SUBCUTANEOUS at 21:58

## 2022-02-26 RX ADMIN — Medication 162 MILLIGRAM(S): at 16:38

## 2022-02-26 RX ADMIN — HEPARIN SODIUM 4100 UNIT(S): 5000 INJECTION INTRAVENOUS; SUBCUTANEOUS at 18:33

## 2022-02-26 RX ADMIN — ATORVASTATIN CALCIUM 80 MILLIGRAM(S): 80 TABLET, FILM COATED ORAL at 22:02

## 2022-02-26 RX ADMIN — HEPARIN SODIUM 800 UNIT(S)/HR: 5000 INJECTION INTRAVENOUS; SUBCUTANEOUS at 23:43

## 2022-02-26 RX ADMIN — HEPARIN SODIUM 800 UNIT(S)/HR: 5000 INJECTION INTRAVENOUS; SUBCUTANEOUS at 18:34

## 2022-02-26 NOTE — H&P ADULT - PROBLEM SELECTOR PLAN 1
-s/p ASA loading in ED  -c/w ASA 81mg, Atorvastatin 80mg qhs  -c/w Heparin gtt  -trend trops  -TTE in AM  -tele monitoring  -monitor electrolytes keep K>4, Mg>2  -f/u A1C and lipid profile -Typical chest pain likely 2/2 to NSTEMI   -s/p ASA loading in ED  -c/w ASA 81mg, Atorvastatin 80mg qhs  -c/w Heparin gtt  -trend cardiac biomarkers including Tn, CKMB  -TTE in AM  -tele monitoring  -monitor electrolytes keep K>4, Mg>2  -f/u A1C and lipid profile

## 2022-02-26 NOTE — ED PROVIDER NOTE - ATTENDING CONTRIBUTION TO CARE
Private Physician Kajal Kumar, PCP, [facility]  67y m pmh CAD sp ptca#3 stents, HTN, DM, No cva,cancer,mi, Pt was in [facility] until approx 4w ago w cp w exertion lasted approx hour. Has had few similar episodes since. Two weeks ago had stress/echo reported normal. Last night had recurrent cp lasting aprox 1h w exertion across upper chest no rad, resolved after taking asa. no sob/diaphoresis/nvdc/abd pain/fc/hemoptysis/sputum. Seen by pmd and referred to ed. Private Physician Kajal Kumar, PCP, [facility]  67y m pmh CAD sp ptca#3 stents, HTN, DM, No cva,cancer,mi, Pt was in us[facility] until approx 4w ago w cp w exertion lasted approx hour. Has had few similar episodes since. Two weeks ago had stress/echo reported normal. Last night had recurrent cp lasting aprox 1h w exertion across upper chest no rad, resolved after taking asa. no sob/diaphoresis/nvdc/abd pain/fc/hemoptysis/sputum. Seen by pmd and referred to ed. PE WDWN male awake alert normocephalic atraumatic neck supple chest clear anterior & posterior cv no rubs, gallops or murmurs abd soft +bs no mass guarding neuro gcs 15 Speech fluent power 5/5 all extr  Richard Escobar MD, Facep

## 2022-02-26 NOTE — ED PROVIDER NOTE - PHYSICAL EXAMINATION
Physical Exam:  Gen: NAD, AOx3, non-toxic appearing, able to ambulate without assistance  Head: NCAT  HEENT: EOMI, PEERLA, normal conjunctiva, tongue midline, oral mucosa moist  Lung: CTAB, no respiratory distress, no wheezes/rhonchi/rales B/L, speaking in full sentences  CV: RRR, no murmurs, rubs or gallops, distal pulses 2+ b/l  Abd: soft, NT, ND  Skin: Warm, well perfused, no rash, no leg swelling  Psych: normal affect, calm

## 2022-02-26 NOTE — CONSULT NOTE ADULT - SUBJECTIVE AND OBJECTIVE BOX
Patient seen and evaluated at bedside    Chief Complaint: Sent in by PCP for [facility]    HPI:  This is a 66yo male with PMHx CAD s/p PCI D1 (12.21.15) and pLAD (16), pAfib on Xarelto, HTN, HLD, T2DM, HFpEF, who presented from PCPs office for angiogram. Reports for past month, on and off exertional chest pain. Had an episode last night that lasted about one hour and resolved on own. Supposedly had normal stress test two weeks ago and a normal echo one week ago. Currently chest pain free. Denies fevers, chills, shortness of breath, orthopnea, PND sxs, LE edema.     EKG sinus with STD in inferior leads and V4-V6 similar to prior EKGs. hsTrop 195. WBC 11. INR 1.82. CXR clear lungs. Started on Heparin gtt in ED and given Aspirin 162mg.     ED spoke to patient's Cardiologist who wanted house Cardiology to be consulted for a cath.       PMHx:   Carotid Stenosis  Diabetes Mellitus  Neuropathy  Former smoker  CAD in native artery  Type 2 diabetes mellitus  Hyperlipidemia, unspecified hyperlipidemia type  Essential hypertension, hypertension with unspecified goal  Afib  Hematoma of leg  Stented coronary artery  CHF (congestive heart failure)      PSHx:   s/p Carotid Endarterectomy    Allergies:  No Known Allergies      Current Medications:   heparin   Injectable 4100 Unit(s) IV Push once  heparin  Infusion.  Unit(s)/Hr IV Continuous <Continuous>      FAMILY HISTORY:  Family history of heart disease  father  age 71    FH: atrial fibrillation  sister      Social History:  Smoking History: denies  Alcohol Use: denies  Drug Use: denies    REVIEW OF SYSTEMS:  Constitutional:     [x ] negative [ ] fevers [ ] chills [ ] weight loss [ ] weight gain  HEENT:                  [x ] negative [ ] dry eyes [ ] eye irritation [ ] postnasal drip [ ] nasal congestion  CV:                         [ ] negative  [x ] chest pain [ ] orthopnea [ ] palpitations [ ] murmur  Resp:                     [x ] negative [ ] cough [ ] shortness of breath [ ] dyspnea [ ] wheezing [ ] sputum [ ]hemoptysis  GI:                          [ x] negative [ ] nausea [ ] vomiting [ ] diarrhea [ ] constipation [ ] abd pain [ ] dysphagia   :                        [ x] negative [ ] dysuria [ ] nocturia [ ] hematuria [ ] increased urinary frequency  Musculoskeletal: [x ] negative [ ] back pain [ ] myalgias [ ] arthralgias [ ] fracture  Skin:                       [ x] negative [ ] rash [ ] itch  Neurological:        [ x] negative [ ] headache [ ] dizziness [ ] syncope [ ] weakness [ ] numbness  Psychiatric:           [ x] negative [ ] anxiety [ ] depression  Endocrine:            [ x] negative [ ] diabetes [ ] thyroid problem  Heme/Lymph:      [ x] negative [ ] anemia [ ] bleeding problem  Allergic/Immune: [ x] negative [ ] itchy eyes [ ] nasal discharge [ ] hives [ ] angioedema    [ x] All other systems negative  [ ] Unable to assess ROS due to      Physical Exam:  T(F): 98.3 (), Max: 98.3 ()  HR: 90 () (88 - 90)  BP: 157/76 () (157/76 - 163/72)  RR: 16 ()  SpO2: 97% ()  GENERAL: No acute distress, well-developed  HEAD:  Atraumatic, Normocephalic  ENT: EOMI, conjunctiva and sclera clear, Neck supple, No JVD, moist mucosa  CHEST/LUNG: Clear to auscultation bilaterally; No wheeze, equal breath sounds bilaterally   HEART: Regular rate and rhythm; No murmurs, rubs, or gallops  ABDOMEN: Soft, Nontender, Nondistended; Bowel sounds present  EXTREMITIES:  No clubbing, cyanosis, or edema  PSYCH: Nl behavior, nl affect  NEUROLOGY: AAOx3, non-focal, cranial nerves intact  SKIN: Normal color, No rashes or lesions    Cardiovascular Diagnostic Testing:    ECG: Personally reviewed:  EKG sinus with STD in inferior leads and V4-V6 similar to prior EKGs.    Echo: Personally reviewed:  TTE 19:  Conclusions:  1. Normal mitral valve. Mild mitral regurgitation.  2. Calcified trileaflet aortic valve with normal opening.  No aortic valve regurgitation seen.  3. Patient in atrial fibrillation; ejection fraction varies  with R-R interval. Overallnormal left ventricular systolic  function. The basal septum is mildly hypokinetic.  4. Normal right ventricular size and function.  *** Compared with echocardiogram of 2019, results are  similar on today's study. Prior study was a TTE with YVES.      Cath:  [facility] 2016:  [facility]  Department of Cardiology  [address] [place] [address]  [phone]  Cath Lab Report -- Comprehensive Report  Patient: KRISTY COYLE  Study date: 2016  Account number: [id number]  MR number: [id number]  : 1955  Gender: Male  Race: W  Case Physician(s):  OUSMANE Payan M.D.  Fellow:  Alec Barillas M.D.  Referring Physician:  Alfred Moon M.D.  INDICATIONS: Stable angina - CCS2. Abnormal stress test.  HISTORY: Thepatient has a history of coronary artery disease. The patient  has hypertension and medication-treated dyslipidemia.  PROCEDURE:  --  Left heart catheterization with ventriculography.  --  Left coronary angiography.  --  Right coronary angiography.  --  Intervention on proximal LAD: drug-eluting stent.  TECHNIQUE: The risks and alternatives of the procedures and conscious  sedation were explained to the patient and informed consent was obtained.  Cardiac catheterization performed electively. Coronary intervention  performed electively.  Local anesthetic given. Right radial artery access. A 6FR PRELUDE KIT was  inserted in the vessel. Left heart catheterization. Ventriculography was  performed. A 5FR  EXPO catheter was utilized. Left coronary artery  angiography. The vessel was injected utilizing a 5FR FL3.5 EXPO catheter.  Right coronary artery angiography. The vessel was injected utilizing a 5FR  FR4.0 EXPO catheter. RADIATION EXPOSURE: 8.5 min. A successful  drug-eluting stent was performed on the 90 % lesion in the proximal LAD.  Following intervention there was a 1 % residual stenosis. According to the  ACC/AHA classification system, this lesion was a type B1 lesion. There was  MIN 3 flow after the procedure. Vessel setup was performed. A 6FR JL3.0  LAUNCHER guiding catheter was used to intubate the vessel. Vessel setup  was performed. A JASSON [id number] wire was used to cross the lesion. A 3.00  X 12 RESOLUTE drug-eluting stent was placed across the lesion and deployed  at a maximum inflation pressure of 16 annalise.  CONTRAST GIVEN: 56 ml Optiray. 33 ml Optiray.  MEDICATIONS GIVEN: Fentanyl, 25 mcg, IV. Midazolam, 1 mg, IV. Verapamil  (Isoptin, Calan, Covera), 2.5 mg, IA. Nitroglycerin, 100 mcg,  intracoronary. Heparin,3000 units, IA. Heparin, 4000 units, IV.  VENTRICLES: Global left ventricular function was hypercontractile. EF  estimated was 70 %.  CORONARY VESSELS: The coronary circulation is co-dominant.  LM:   --  LM: Normal.  LAD:   --  LAD: Normal.  --  Proximal LAD: There was a 90 % stenosis.  --  Mid LAD: There was a 40 % stenosis.  --  D1: There was a 50 % stenosis.  CX:   --  Circumflex: Normal.  RCA:   --  RCA: Normal.  COMPLICATIONS: There were no complications.  INTERVENTIONAL RECOMMENDATIONS: ASAand Plavix for 1 year      Imaging:    CXR: Personally reviewed    Labs: Personally reviewed                        17.2   11.37 )-----------( 220      ( 2022 16:53 )             48.6         140  |  99  |  15  ----------------------------<  237<H>  4.3   |  26  |  1.18    Ca    10.1      2022 16:53    TPro  7.5  /  Alb  4.6  /  TBili  2.2<H>  /  DBili  x   /  AST  30  /  ALT  17  /  AlkPhos  145<H>      PT/INR - ( 2022 16:53 )   PT: 22.0 sec;   INR: 1.82 ratio         PTT - ( 2022 16:53 )  PTT:43.0 sec         Patient seen and evaluated at bedside    Chief Complaint: Sent in by PCP for [facility]    HPI:  This is a 66yo male with PMHx CAD s/p PCI D1 (12.21.15) and pLAD (16), pAfib on Xarelto, HTN, HLD, T2DM, HFpEF, who presented from PCPs office for angiogram. Reports for past month, on and off exertional chest pain. Had an episode last night that lasted about one hour and resolved on own. Supposedly had normal stress test two weeks ago and a normal echo one week ago. Currently chest pain free. Denies fevers, chills, shortness of breath, orthopnea, PND sxs, LE edema.     EKG sinus with STD in inferior leads and V4-V6 similar to prior EKGs. hsTrop 195. WBC 11. INR 1.82. CXR clear lungs. Started on Heparin gtt in ED and given Aspirin 162mg.     ED spoke to patient's Cardiologist (Dr. Alfred Moon) who wanted house Cardiology to be consulted for a cath.       PMHx:   Carotid Stenosis  Diabetes Mellitus  Neuropathy  Former smoker  CAD in native artery  Type 2 diabetes mellitus  Hyperlipidemia, unspecified hyperlipidemia type  Essential hypertension, hypertension with unspecified goal  Afib  Hematoma of leg  Stented coronary artery  CHF (congestive heart failure)      PSHx:   s/p Carotid Endarterectomy    Allergies:  No Known Allergies      Current Medications:   heparin   Injectable 4100 Unit(s) IV Push once  heparin  Infusion.  Unit(s)/Hr IV Continuous <Continuous>      FAMILY HISTORY:  Family history of heart disease  father  age 71    FH: atrial fibrillation  sister      Social History:  Smoking History: denies  Alcohol Use: denies  Drug Use: denies    REVIEW OF SYSTEMS:  Constitutional:     [x ] negative [ ] fevers [ ] chills [ ] weight loss [ ] weight gain  HEENT:                  [x ] negative [ ] dry eyes [ ] eye irritation [ ] postnasal drip [ ] nasal congestion  CV:                         [ ] negative  [x ] chest pain [ ] orthopnea [ ] palpitations [ ] murmur  Resp:                     [x ] negative [ ] cough [ ] shortness of breath [ ] dyspnea [ ] wheezing [ ] sputum [ ]hemoptysis  GI:                          [ x] negative [ ] nausea [ ] vomiting [ ] diarrhea [ ] constipation [ ] abd pain [ ] dysphagia   :                        [ x] negative [ ] dysuria [ ] nocturia [ ] hematuria [ ] increased urinary frequency  Musculoskeletal: [x ] negative [ ] back pain [ ] myalgias [ ] arthralgias [ ] fracture  Skin:                       [ x] negative [ ] rash [ ] itch  Neurological:        [ x] negative [ ] headache [ ] dizziness [ ] syncope [ ] weakness [ ] numbness  Psychiatric:           [ x] negative [ ] anxiety [ ] depression  Endocrine:            [ x] negative [ ] diabetes [ ] thyroid problem  Heme/Lymph:      [ x] negative [ ] anemia [ ] bleeding problem  Allergic/Immune: [ x] negative [ ] itchy eyes [ ] nasal discharge [ ] hives [ ] angioedema    [ x] All other systems negative  [ ] Unable to assess ROS due to      Physical Exam:  T(F): 98.3 (), Max: 98.3 ()  HR: 90 () (88 - 90)  BP: 157/76 () (157/76 - 163/72)  RR: 16 ()  SpO2: 97% ()  GENERAL: No acute distress, well-developed  HEAD:  Atraumatic, Normocephalic  ENT: EOMI, conjunctiva and sclera clear, Neck supple, No JVD, moist mucosa  CHEST/LUNG: Clear to auscultation bilaterally; No wheeze, equal breath sounds bilaterally   HEART: Regular rate and rhythm; No murmurs, rubs, or gallops  ABDOMEN: Soft, Nontender, Nondistended; Bowel sounds present  EXTREMITIES:  No clubbing, cyanosis, or edema  PSYCH: Nl behavior, nl affect  NEUROLOGY: AAOx3, non-focal, cranial nerves intact  SKIN: Normal color, No rashes or lesions    Cardiovascular Diagnostic Testing:    ECG: Personally reviewed:  EKG sinus with STD in inferior leads and V4-V6 similar to prior EKGs.    Echo: Personally reviewed:  TTE 19:  Conclusions:  1. Normal mitral valve. Mild mitral regurgitation.  2. Calcified trileaflet aortic valve with normal opening.  No aortic valve regurgitation seen.  3. Patient in atrial fibrillation; ejection fraction varies  with R-R interval. Overallnormal left ventricular systolic  function. The basal septum is mildly hypokinetic.  4. Normal right ventricular size and function.  *** Compared with echocardiogram of 2019, results are  similar on today's study. Prior study was a TTE with YVES.      Cath:  [facility] 2016:  [facility] system  Department of Cardiology  [address] [place] [address]  [phone]  Cath Lab Report -- Comprehensive Report  Patient: KRISTY COYLE  Study date: 2016  Account number: [id number]  MR number: [id number]  : 1955  Gender: Male  Race: W  Case Physician(s):  OUSMANE Payan M.D.  Fellow:  Alec Barillas M.D.  Referring Physician:  Alfred Moon M.D.  INDICATIONS: Stable angina - CCS2. Abnormal stress test.  HISTORY: Thepatient has a history of coronary artery disease. The patient  has hypertension and medication-treated dyslipidemia.  PROCEDURE:  --  Left heart catheterization with ventriculography.  --  Left coronary angiography.  --  Right coronary angiography.  --  Intervention on proximal LAD: drug-eluting stent.  TECHNIQUE: The risks and alternatives of the procedures and conscious  sedation were explained to the patient and informed consent was obtained.  Cardiac catheterization performed electively. Coronary intervention  performed electively.  Local anesthetic given. Right radial artery access. A 6FR PRELUDE KIT was  inserted in the vessel. Left heart catheterization. Ventriculography was  performed. A 5FR  EXPO catheter was utilized. Left coronary artery  angiography. The vessel was injected utilizing a 5FR FL3.5 EXPO catheter.  Right coronary artery angiography. The vessel was injected utilizing a 5FR  FR4.0 EXPO catheter. RADIATION EXPOSURE: 8.5 min. A successful  drug-eluting stent was performed on the 90 % lesion in the proximal LAD.  Following intervention there was a 1 % residual stenosis. According to the  ACC/AHA classification system, this lesion was a type B1 lesion. There was  MIN 3 flow after the procedure. Vessel setup was performed. A 6FR JL3.0  LAUNCHER guiding catheter was used to intubate the vessel. Vessel setup  was performed. A JASSON [id number] wire was used to cross the lesion. A 3.00  X 12 RESOLUTE drug-eluting stent was placed across the lesion and deployed  at a maximum inflation pressure of 16 annalise.  CONTRAST GIVEN: 56 ml Optiray. 33 ml Optiray.  MEDICATIONS GIVEN: Fentanyl, 25 mcg, IV. Midazolam, 1 mg, IV. Verapamil  (Isoptin, Calan, Covera), 2.5 mg, IA. Nitroglycerin, 100 mcg,  intracoronary. Heparin,3000 units, IA. Heparin, 4000 units, IV.  VENTRICLES: Global left ventricular function was hypercontractile. EF  estimated was 70 %.  CORONARY VESSELS: The coronary circulation is co-dominant.  LM:   --  LM: Normal.  LAD:   --  LAD: Normal.  --  Proximal LAD: There was a 90 % stenosis.  --  Mid LAD: There was a 40 % stenosis.  --  D1: There was a 50 % stenosis.  CX:   --  Circumflex: Normal.  RCA:   --  RCA: Normal.  COMPLICATIONS: There were no complications.  INTERVENTIONAL RECOMMENDATIONS: ASAand Plavix for 1 year      Imaging:    CXR: Personally reviewed    Labs: Personally reviewed                        17.2   11.37 )-----------( 220      ( 2022 16:53 )             48.6         140  |  99  |  15  ----------------------------<  237<H>  4.3   |  26  |  1.18    Ca    10.1      2022 16:53    TPro  7.5  /  Alb  4.6  /  TBili  2.2<H>  /  DBili  x   /  AST  30  /  ALT  17  /  AlkPhos  145<H>      PT/INR - ( 2022 16:53 )   PT: 22.0 sec;   INR: 1.82 ratio         PTT - ( 2022 16:53 )  PTT:43.0 sec

## 2022-02-26 NOTE — H&P ADULT - ASSESSMENT
66yo male with PMHx CAD s/p PCI D1 (12.21.15) and pLAD (5/2/16), pAfib on Xarelto, HTN, HLD, T2DM, HFpEF, who presented from PCPs office for angiogram.   68yo male with PMHx CAD s/p PCI D1 (12.21.15) and pLAD (5/2/16), pAfib on Xarelto, HTN, HLD, T2DM, HFpEF, who presented from PCPs with typical chest pain found to have NSTEMI

## 2022-02-26 NOTE — ED ADULT NURSE NOTE - OBJECTIVE STATEMENT
Ambulatory, well-appearing patient in no apparent distress complains of resolved chest pain.  Pt reports that he had an episode of b/l upper chest pain last night while "running around doing things," resolved after about an hour of resting and an aspirin.  Did not radiate and was not associated with any other symptoms.  Followed up with his PMD today where he had a normal EKG but was sent to ER for further work up.  Pt has not had any chest pain today.  Had normal stress echo 2 weeks ago for another similar episode of chest pain that occurred 4 weeks ago, has been having intermittent episodes of pain since then.  Pt is conversive, abdomen soft/non-distended/non-tender, skin warm, dry, intact denies shortness of breath, abdominal pain, nausea, vomiting, diarrhea, fevers, chills, urinary symptoms, falls, syncope.  HX of HTN ran out of meds for 3 days and restarted them 2 days ago.  EKG done and CCM in place.

## 2022-02-26 NOTE — ED PROVIDER NOTE - WET READ LAUNCH FT
Pt calling back in again today stating that she has never heard anything back regarding her UTI.  Pt stated that her condition has gotten worse and she needs a call back as soon as possible at [phone].   There are no Wet Read(s) to document.

## 2022-02-26 NOTE — H&P ADULT - NSHPLABSRESULTS_GEN_ALL_CORE
17.2   11.37 )-----------( 220      ( 26 Feb 2022 16:53 )             48.6       02-26    140  |  99  |  15  ----------------------------<  237<H>  4.3   |  26  |  1.18    Ca    10.1      26 Feb 2022 16:53    TPro  7.5  /  Alb  4.6  /  TBili  2.2<H>  /  DBili  x   /  AST  30  /  ALT  17  /  AlkPhos  145<H>  02-26      CARDIAC MARKERS ( 26 Feb 2022 16:53 )  x     / x     / x     / x     / 15.8 ng/mL        LIVER FUNCTIONS - ( 26 Feb 2022 16:53 )  Alb: 4.6 g/dL / Pro: 7.5 g/dL / ALK PHOS: 145 U/L / ALT: 17 U/L / AST: 30 U/L / GGT: x             PT/INR - ( 26 Feb 2022 16:53 )   PT: 22.0 sec;   INR: 1.82 ratio         PTT - ( 26 Feb 2022 16:53 )  PTT:43.0 sec      I have personally reviewed EKG  I have personally reviewed imaging, CXR w/o e/o acute pulmonary disease 17.2   11.37 )-----------( 220      ( 26 Feb 2022 16:53 )             48.6       02-26    140  |  99  |  15  ----------------------------<  237<H>  4.3   |  26  |  1.18    Ca    10.1      26 Feb 2022 16:53    TPro  7.5  /  Alb  4.6  /  TBili  2.2<H>  /  DBili  x   /  AST  30  /  ALT  17  /  AlkPhos  145<H>  02-26      CARDIAC MARKERS ( 26 Feb 2022 16:53 )  x     / x     / x     / x     / 15.8 ng/mL        LIVER FUNCTIONS - ( 26 Feb 2022 16:53 )  Alb: 4.6 g/dL / Pro: 7.5 g/dL / ALK PHOS: 145 U/L / ALT: 17 U/L / AST: 30 U/L / GGT: x             PT/INR - ( 26 Feb 2022 16:53 )   PT: 22.0 sec;   INR: 1.82 ratio         PTT - ( 26 Feb 2022 16:53 )  PTT:43.0 sec      EKG not available in chart for review, will order another one  I have personally reviewed imaging, CXR w/o e/o acute pulmonary disease

## 2022-02-26 NOTE — ED ADULT NURSE REASSESSMENT NOTE - NS ED NURSE REASSESS COMMENT FT1
pt states his weight was 66.7kg in triage, actual weight on standing scale states 66.1. No change in heparin dose according to ACS nomogram. provider aware

## 2022-02-26 NOTE — CONSULT NOTE ADULT - ATTENDING COMMENTS
Continue heparin gtt  Trend enzymes  Plan for Left heart cath tomorrow   Dr. Pemberton to see from gen cards are well      Miguel [phone]

## 2022-02-26 NOTE — ED PROVIDER NOTE - OBJECTIVE STATEMENT
68yo male CAD multiple stents on xarelto, HTN, DM p/w exertion mid-sternal chest pain relieved by rest, last episode last night that lasted 1 hour after onset. Had another episode 1 month prior, had normal stress 2 weeks ago and normal echo 1 week ago per patient. Saw PMD Mislavi this morning and sent in for cath. Denies current chest pain, leg swelling, h/o PE/DVT.

## 2022-02-26 NOTE — H&P ADULT - NSHPPHYSICALEXAM_GEN_ALL_CORE
Vital Signs Last 24 Hrs  T(C): 36.9 (26 Feb 2022 20:41), Max: 37 (26 Feb 2022 19:49)  T(F): 98.5 (26 Feb 2022 20:41), Max: 98.6 (26 Feb 2022 19:49)  HR: 67 (26 Feb 2022 20:41) (67 - 90)  BP: 144/70 (26 Feb 2022 20:41) (144/70 - 163/72)  BP(mean): 85 (26 Feb 2022 19:49) (85 - 85)  RR: 19 (26 Feb 2022 20:41) (16 - 19)  SpO2: 98% (26 Feb 2022 20:41) (95% - 99%)

## 2022-02-26 NOTE — ED PROVIDER NOTE - PROGRESS NOTE DETAILS
Discussed with Dr José Escobar MD, Facep Manish PGy3: Dr. Escobar spoke to patient's cardiologist Sarai, recommends involving house cardiology for eventual cath during admission. Cardiology fellow consulted.

## 2022-02-26 NOTE — ED PROVIDER NOTE - CLINICAL SUMMARY MEDICAL DECISION MAKING FREE TEXT BOX
66yo male CAD p/w episodic exertional chest pain. Spoke to PMD ovidio, wants admitted for cath. EKG unchanged from baseline. Benign exam. Labs, CXR, admit.

## 2022-02-26 NOTE — H&P ADULT - NSHPREVIEWOFSYSTEMS_GEN_ALL_CORE
GEN: no night sweats or change in appetite  EYES: no changes in vision or diplopia   ENT: no epistaxis, sinus pain, gingival bleeding, odynophagia or dysphagia  CV: +CP, PND or palpitations  RESP: no cough, wheezing, or hemoptysis  GI: no hematemesis, hematochezia, or melena  : no dysuria, polyuria, or hematuria  MSK: no arthralgias or joint swelling   NEURO: no gross sensory changes, numbness, focal deficits  PSYCH: no depression or changes in concentration  HEME/ONC: no purpura, petechiae or night sweats  SKIN: no pruritus, hair loss or skin lesions  ALL: no photosensitivity, no complaints of anaphylaxis (SOB, throat swelling) GEN: no night sweats or change in appetite  EYES: no changes in vision or diplopia   ENT: no epistaxis, sinus pain, gingival bleeding, odynophagia or dysphagia  CV: +CP, no PND or palpitations  RESP: no cough, wheezing, or hemoptysis  GI: no hematemesis, hematochezia, or melena  : no dysuria, polyuria, or hematuria  MSK: no arthralgias or joint swelling   NEURO: no gross sensory changes, numbness, focal deficits  PSYCH: no depression or changes in concentration  HEME/ONC: no purpura, petechiae or night sweats  SKIN: no pruritus, hair loss or skin lesions  ALL: no photosensitivity, no complaints of anaphylaxis (SOB, throat swelling)

## 2022-02-26 NOTE — ED PROVIDER NOTE - CARE PLAN
Principal Discharge DX:	Exertional chest pain   1 Principal Discharge DX:	NSTEMI (non-ST elevation myocardial infarction)

## 2022-02-26 NOTE — ED ADULT TRIAGE NOTE - PATIENT ON (OXYGEN DELIVERY METHOD)
How Severe Are Your Spot(S)?: moderate
What Is The Reason For Today's Visit?: Full Body Skin Examination
What Is The Reason For Today's Visit? (Being Monitored For X): concerning skin lesions on an annual basis
room air

## 2022-02-26 NOTE — CONSULT NOTE ADULT - ASSESSMENT
This is a 66yo male with PMHx CAD s/p PCI D1 (12.21.15) and pLAD (5/2/16), pAfib on Xarelto, HTN, HLD, T2DM, HFpEF, who presented for one month of chest pain with exertion.     1. Typical Chest Pain  - One month of sxs which improve with rest  - Reports having normal stress test and echo in past 1-2 weeks, please obtain these records   - Add on CK-MB and trend if abnormal  - Trend hsTrop to peak  - Add on BNP  - Hold Xarelto for LHC  - TTE   - Monitor on Telemetry  - Keep K > 4, Mag > 2    2. Paroxysmal Afib  - EKG NSR on admission   - Continue BB  - Hold AC as above    3. HFpEF  - Euvolemic on exam  - Follow up TTE       Yoel Barber MD  Cardiology Fellow - PGY 4  Text or Call: [phone]  For all New Consults and Questions:  [email or web address]   Login: Roost   This is a 66yo male with PMHx CAD s/p PCI D1 (12.21.15) and pLAD (5/2/16), pAfib on Xarelto, HTN, HLD, T2DM, HFpEF, who presented for one month of chest pain with exertion.     1. Typical Chest Pain  - One month of anginal sxs which improve with rest  - Reports having normal stress test and echo in past 1-2 weeks, please obtain these records   - Add on CK-MB and trend if abnormal  - Trend hsTrop to peak  - Add on BNP  - Hold Xarelto for LHC  - Continue Aspirin 81mg, Atorvastatin 80mg qHS  - TTE   - Monitor on Telemetry  - Keep K > 4, Mag > 2    2. Paroxysmal Afib  - EKG NSR on admission   - Continue BB  - Hold AC as above    3. HFpEF  - Euvolemic on exam  - Follow up TTE       Yoel Barber MD  Cardiology Fellow - PGY 4  Text or Call: [phone]  For all New Consults and Questions:  [email or web address]   Login: [email or web address]   This is a 68yo male with PMHx CAD s/p PCI D1 (12.21.15) and pLAD (5/2/16), pAfib on Xarelto, HTN, HLD, T2DM, HFpEF, who presented for one month of chest pain with exertion.     1. Typical Chest Pain  - One month of anginal sxs which improve with rest  - Reports having normal stress test and echo in past 1-2 weeks, please obtain these records   - Add on CK-MB and trend if abnormal  - Trend hsTrop to peak  - Add on BNP  - Hold Xarelto for LHC  - Continue Aspirin 81mg, Atorvastatin 80mg qHS  - TTE   - Monitor on Telemetry  - Keep K > 4, Mag > 2    2. Paroxysmal Afib  - EKG NSR on admission   - Continue BB  - Hold Xarelto as above, currently on Heparin gtt    3. HFpEF  - Euvolemic on exam  - Follow up TTE       Yoel Barber MD  Cardiology Fellow - PGY 4  Text or Call: [phone]  For all New Consults and Questions:  [email or web address]   Login: [email or web address]   This is a 66yo male with PMHx CAD s/p PCI D1 (12.21.15) and pLAD (5/2/16), pAfib on Xarelto, HTN, HLD, T2DM, HFpEF, who presented for one month of chest pain with exertion. ED spoke to patient's Cardiologist (Dr. Alfred Moon) who wanted house Cardiology to be consulted for a cath.     1. Typical Chest Pain  - One month of anginal sxs which improve with rest  - Reports having normal stress test and echo in past 1-2 weeks, please obtain these records   - Add on CK-MB and trend if abnormal  - Trend hsTrop to peak  - Add on BNP  - Hold Xarelto for [facility]  - Continue Aspirin 81mg, Atorvastatin 80mg qHS  - TTE   - Monitor on Telemetry  - Keep K > 4, Mag > 2    2. Paroxysmal Afib  - EKG NSR on admission   - Continue BB  - Hold Xarelto as above, currently on Heparin gtt    3. HFpEF  - Euvolemic on exam  - Follow up TTE       Yoel Barber MD  Cardiology Fellow - PGY 4  Text or Call: [phone]  For all New Consults and Questions:  [email or web address]   Login: [employer]   This is a 66yo male with PMHx CAD s/p PCI D1 (12.21.15) and pLAD (5/2/16), pAfib on Xarelto, HTN, HLD, T2DM, HFpEF, who presented for one month of chest pain with exertion. ED spoke to patient's Cardiologist (Dr. Alfred Moon) who wanted house Cardiology to be consulted for a cath.     1. Typical Chest Pain  - One month of anginal sxs which improve with rest  - Reports having normal stress test and echo in past 1-2 weeks, please obtain these records   - Add on CK-MB and trend if abnormal  - Trend hsTrop to peak  - Add on BNP  - Hold Xarelto for LHC (last dose 2/25)  - Continue Aspirin 81mg, Atorvastatin 80mg qHS  - TTE   - Monitor on Telemetry  - Keep K > 4, Mag > 2    2. Paroxysmal Afib  - EKG NSR on admission   - Continue BB  - Hold Xarelto as above, currently on Heparin gtt    3. HFpEF  - Euvolemic on exam  - Follow up TTE       Yoel Barber MD  Cardiology Fellow - PGY 4  Text or Call: [phone]  For all New Consults and Questions:  [email or web address]   Login: [employer]   This is a 66yo male with PMHx CAD s/p PCI D1 (12.21.15) and pLAD (5/2/16), pAfib on Xarelto, HTN, HLD, T2DM, HFpEF, who presented for one month of chest pain with exertion. ED spoke to patient's Cardiologist (Dr. Alfred Moon) who wanted house Cardiology to be consulted for a cath.     1. Typical Chest Pain  - One month of anginal sxs which improve with rest  - Reports having normal stress test and echo in past 1-2 weeks, please obtain these records   - Add on CK-MB and trend if abnormal  - Trend hsTrop to peak  - Add on BNP  - Hold Xarelto for [facility] Monday (last dose 2/25)  - Continue Aspirin 81mg, Atorvastatin 80mg qHS  - TTE   - Monitor on Telemetry  - Keep K > 4, Mag > 2    2. Paroxysmal Afib  - EKG NSR on admission   - Continue BB  - Hold Xarelto as above, currently on Heparin gtt    3. HFpEF  - Euvolemic on exam  - Follow up TTE       Yoel Barber MD  Cardiology Fellow - PGY 4  Text or Call: [phone]  For all New Consults and Questions:  [email or web address]   Login: [employer]

## 2022-02-26 NOTE — H&P ADULT - HISTORY OF PRESENT ILLNESS
66yo male with PMHx CAD s/p PCI D1 (12.21.15) and pLAD (5/2/16), pAfib on Xarelto, HTN, HLD, T2DM, HFpEF, who presented from PCPs office for angiogram.      ED course: afebrile. HDS. Given ASA 162mg, started Heparin gtt   67M PMH CAD s/p PCI D1 (12.21.15) and pLAD (5/2/16), pAfib on Xarelto, HTN, HLD, T2DM, HFpEF, sent in by his PCP for an angiogram. He had presented to his PCP with a 1-month h/o exertional chest pain that was relieved with rest. He reports recently having a stress test and TTE done that was normal. States at his PCP, there were concerning EKG changes noted compared to priors and in addition to sxs, decided to send him in. Denied fevers, chills, SOB, palpitations, dizziness, lightheadedness.       ED course: afebrile. HDS. Given ASA 162mg, started Heparin gtt

## 2022-02-26 NOTE — H&P ADULT - PROBLEM SELECTOR PLAN 2
-EKG NSR on admission  -c/w Metoprolol and diltiazem  -hold Xarelto and c/w heparin gtt -rate-controlled  -c/w Metoprolol and diltiazem  -hold Xarelto and c/w heparin gtt

## 2022-02-27 LAB
A1C WITH ESTIMATED AVERAGE GLUCOSE RESULT: 8.8 % — HIGH (ref 4–5.6)
ALBUMIN SERPL ELPH-MCNC: 3.8 G/DL — SIGNIFICANT CHANGE UP (ref 3.3–5)
ALP SERPL-CCNC: 118 U/L — SIGNIFICANT CHANGE UP (ref 40–120)
ALT FLD-CCNC: 13 U/L — SIGNIFICANT CHANGE UP (ref 10–45)
ANION GAP SERPL CALC-SCNC: 12 MMOL/L — SIGNIFICANT CHANGE UP (ref 5–17)
APTT BLD: 58.3 SEC — HIGH (ref 27.5–35.5)
APTT BLD: 61.1 SEC — HIGH (ref 27.5–35.5)
APTT BLD: 98.1 SEC — HIGH (ref 27.5–35.5)
AST SERPL-CCNC: 22 U/L — SIGNIFICANT CHANGE UP (ref 10–40)
BILIRUB SERPL-MCNC: 1.3 MG/DL — HIGH (ref 0.2–1.2)
BUN SERPL-MCNC: 17 MG/DL — SIGNIFICANT CHANGE UP (ref 7–23)
CALCIUM SERPL-MCNC: 9 MG/DL — SIGNIFICANT CHANGE UP (ref 8.4–10.5)
CHLORIDE SERPL-SCNC: 103 MMOL/L — SIGNIFICANT CHANGE UP (ref 96–108)
CO2 SERPL-SCNC: 23 MMOL/L — SIGNIFICANT CHANGE UP (ref 22–31)
CREAT SERPL-MCNC: 1.12 MG/DL — SIGNIFICANT CHANGE UP (ref 0.5–1.3)
ESTIMATED AVERAGE GLUCOSE: 206 MG/DL — HIGH (ref 68–114)
GLUCOSE BLDC GLUCOMTR-MCNC: 171 MG/DL — HIGH (ref 70–99)
GLUCOSE BLDC GLUCOMTR-MCNC: 199 MG/DL — HIGH (ref 70–99)
GLUCOSE BLDC GLUCOMTR-MCNC: 243 MG/DL — HIGH (ref 70–99)
GLUCOSE BLDC GLUCOMTR-MCNC: 353 MG/DL — HIGH (ref 70–99)
GLUCOSE SERPL-MCNC: 191 MG/DL — HIGH (ref 70–99)
HCT VFR BLD CALC: 41.7 % — SIGNIFICANT CHANGE UP (ref 39–50)
HCT VFR BLD CALC: 42.9 % — SIGNIFICANT CHANGE UP (ref 39–50)
HCT VFR BLD CALC: 49 % — SIGNIFICANT CHANGE UP (ref 39–50)
HGB BLD-MCNC: 14.7 G/DL — SIGNIFICANT CHANGE UP (ref 13–17)
HGB BLD-MCNC: 15 G/DL — SIGNIFICANT CHANGE UP (ref 13–17)
HGB BLD-MCNC: 17.1 G/DL — HIGH (ref 13–17)
MAGNESIUM SERPL-MCNC: 2 MG/DL — SIGNIFICANT CHANGE UP (ref 1.6–2.6)
MCHC RBC-ENTMCNC: 30.7 PG — SIGNIFICANT CHANGE UP (ref 27–34)
MCHC RBC-ENTMCNC: 30.8 PG — SIGNIFICANT CHANGE UP (ref 27–34)
MCHC RBC-ENTMCNC: 31.5 PG — SIGNIFICANT CHANGE UP (ref 27–34)
MCHC RBC-ENTMCNC: 34.9 GM/DL — SIGNIFICANT CHANGE UP (ref 32–36)
MCHC RBC-ENTMCNC: 35 GM/DL — SIGNIFICANT CHANGE UP (ref 32–36)
MCHC RBC-ENTMCNC: 35.3 GM/DL — SIGNIFICANT CHANGE UP (ref 32–36)
MCV RBC AUTO: 87.9 FL — SIGNIFICANT CHANGE UP (ref 80–100)
MCV RBC AUTO: 88.3 FL — SIGNIFICANT CHANGE UP (ref 80–100)
MCV RBC AUTO: 89.5 FL — SIGNIFICANT CHANGE UP (ref 80–100)
NRBC # BLD: 0 /100 WBCS — SIGNIFICANT CHANGE UP (ref 0–0)
PHOSPHATE SERPL-MCNC: 2.6 MG/DL — SIGNIFICANT CHANGE UP (ref 2.5–4.5)
PLATELET # BLD AUTO: 195 K/UL — SIGNIFICANT CHANGE UP (ref 150–400)
PLATELET # BLD AUTO: 198 K/UL — SIGNIFICANT CHANGE UP (ref 150–400)
PLATELET # BLD AUTO: 229 K/UL — SIGNIFICANT CHANGE UP (ref 150–400)
POTASSIUM SERPL-MCNC: 4 MMOL/L — SIGNIFICANT CHANGE UP (ref 3.5–5.3)
POTASSIUM SERPL-SCNC: 4 MMOL/L — SIGNIFICANT CHANGE UP (ref 3.5–5.3)
PROT SERPL-MCNC: 5.9 G/DL — LOW (ref 6–8.3)
RBC # BLD: 4.66 M/UL — SIGNIFICANT CHANGE UP (ref 4.2–5.8)
RBC # BLD: 4.88 M/UL — SIGNIFICANT CHANGE UP (ref 4.2–5.8)
RBC # BLD: 5.55 M/UL — SIGNIFICANT CHANGE UP (ref 4.2–5.8)
RBC # FLD: 12.7 % — SIGNIFICANT CHANGE UP (ref 10.3–14.5)
RBC # FLD: 12.8 % — SIGNIFICANT CHANGE UP (ref 10.3–14.5)
RBC # FLD: 12.9 % — SIGNIFICANT CHANGE UP (ref 10.3–14.5)
SODIUM SERPL-SCNC: 138 MMOL/L — SIGNIFICANT CHANGE UP (ref 135–145)
TROPONIN T, HIGH SENSITIVITY RESULT: 199 NG/L — HIGH (ref 0–51)
WBC # BLD: 9.04 K/UL — SIGNIFICANT CHANGE UP (ref 3.8–10.5)
WBC # BLD: 9.22 K/UL — SIGNIFICANT CHANGE UP (ref 3.8–10.5)
WBC # BLD: 9.69 K/UL — SIGNIFICANT CHANGE UP (ref 3.8–10.5)
WBC # FLD AUTO: 9.04 K/UL — SIGNIFICANT CHANGE UP (ref 3.8–10.5)
WBC # FLD AUTO: 9.22 K/UL — SIGNIFICANT CHANGE UP (ref 3.8–10.5)
WBC # FLD AUTO: 9.69 K/UL — SIGNIFICANT CHANGE UP (ref 3.8–10.5)

## 2022-02-27 PROCEDURE — 99255 IP/OBS CONSLTJ NEW/EST HI 80: CPT

## 2022-02-27 RX ORDER — HEPARIN SODIUM 5000 [USP'U]/ML
600 INJECTION INTRAVENOUS; SUBCUTANEOUS
Qty: 25000 | Refills: 0 | Status: DISCONTINUED | OUTPATIENT
Start: 2022-02-27 | End: 2022-03-01

## 2022-02-27 RX ADMIN — INSULIN GLARGINE 24 UNIT(S): 100 INJECTION, SOLUTION SUBCUTANEOUS at 22:13

## 2022-02-27 RX ADMIN — ATORVASTATIN CALCIUM 80 MILLIGRAM(S): 80 TABLET, FILM COATED ORAL at 21:16

## 2022-02-27 RX ADMIN — Medication 5: at 11:53

## 2022-02-27 RX ADMIN — Medication 81 MILLIGRAM(S): at 11:10

## 2022-02-27 RX ADMIN — Medication 50 MILLIGRAM(S): at 05:25

## 2022-02-27 RX ADMIN — HEPARIN SODIUM 600 UNIT(S)/HR: 5000 INJECTION INTRAVENOUS; SUBCUTANEOUS at 03:52

## 2022-02-27 RX ADMIN — Medication 2: at 08:00

## 2022-02-27 RX ADMIN — Medication 1: at 16:36

## 2022-02-27 RX ADMIN — Medication 180 MILLIGRAM(S): at 05:26

## 2022-02-27 RX ADMIN — HEPARIN SODIUM 600 UNIT(S)/HR: 5000 INJECTION INTRAVENOUS; SUBCUTANEOUS at 17:18

## 2022-02-27 RX ADMIN — HEPARIN SODIUM 600 UNIT(S)/HR: 5000 INJECTION INTRAVENOUS; SUBCUTANEOUS at 11:02

## 2022-02-27 NOTE — PROGRESS NOTE ADULT - ATTENDING COMMENTS
Continue heparin gtt  Trend enzymes  Plan for Left heart cath tomorrow   Dr. Pemberton to see from gen cards are well

## 2022-02-27 NOTE — PATIENT PROFILE ADULT - FALL HARM RISK - HARM RISK INTERVENTIONS

## 2022-02-28 LAB
ANION GAP SERPL CALC-SCNC: 16 MMOL/L — SIGNIFICANT CHANGE UP (ref 5–17)
APTT BLD: 50.9 SEC — HIGH (ref 27.5–35.5)
APTT BLD: 51.8 SEC — HIGH (ref 27.5–35.5)
APTT BLD: 58.9 SEC — HIGH (ref 27.5–35.5)
BUN SERPL-MCNC: 20 MG/DL — SIGNIFICANT CHANGE UP (ref 7–23)
CALCIUM SERPL-MCNC: 9.1 MG/DL — SIGNIFICANT CHANGE UP (ref 8.4–10.5)
CHLORIDE SERPL-SCNC: 106 MMOL/L — SIGNIFICANT CHANGE UP (ref 96–108)
CO2 SERPL-SCNC: 17 MMOL/L — LOW (ref 22–31)
CREAT SERPL-MCNC: 1.41 MG/DL — HIGH (ref 0.5–1.3)
GLUCOSE BLDC GLUCOMTR-MCNC: 142 MG/DL — HIGH (ref 70–99)
GLUCOSE BLDC GLUCOMTR-MCNC: 170 MG/DL — HIGH (ref 70–99)
GLUCOSE BLDC GLUCOMTR-MCNC: 181 MG/DL — HIGH (ref 70–99)
GLUCOSE BLDC GLUCOMTR-MCNC: 286 MG/DL — HIGH (ref 70–99)
GLUCOSE SERPL-MCNC: 148 MG/DL — HIGH (ref 70–99)
HCT VFR BLD CALC: 42.8 % — SIGNIFICANT CHANGE UP (ref 39–50)
HGB BLD-MCNC: 15 G/DL — SIGNIFICANT CHANGE UP (ref 13–17)
INR BLD: 1.45 RATIO — HIGH (ref 0.88–1.16)
MCHC RBC-ENTMCNC: 31.1 PG — SIGNIFICANT CHANGE UP (ref 27–34)
MCHC RBC-ENTMCNC: 35 GM/DL — SIGNIFICANT CHANGE UP (ref 32–36)
MCV RBC AUTO: 88.6 FL — SIGNIFICANT CHANGE UP (ref 80–100)
NRBC # BLD: 0 /100 WBCS — SIGNIFICANT CHANGE UP (ref 0–0)
PLATELET # BLD AUTO: 194 K/UL — SIGNIFICANT CHANGE UP (ref 150–400)
POTASSIUM SERPL-MCNC: 4 MMOL/L — SIGNIFICANT CHANGE UP (ref 3.5–5.3)
POTASSIUM SERPL-SCNC: 4 MMOL/L — SIGNIFICANT CHANGE UP (ref 3.5–5.3)
PROTHROM AB SERPL-ACNC: 17.5 SEC — HIGH (ref 10.5–13.4)
RBC # BLD: 4.83 M/UL — SIGNIFICANT CHANGE UP (ref 4.2–5.8)
RBC # FLD: 12.7 % — SIGNIFICANT CHANGE UP (ref 10.3–14.5)
SODIUM SERPL-SCNC: 139 MMOL/L — SIGNIFICANT CHANGE UP (ref 135–145)
WBC # BLD: 8.8 K/UL — SIGNIFICANT CHANGE UP (ref 3.8–10.5)
WBC # FLD AUTO: 8.8 K/UL — SIGNIFICANT CHANGE UP (ref 3.8–10.5)

## 2022-02-28 PROCEDURE — 93454 CORONARY ARTERY ANGIO S&I: CPT | Mod: 26,59

## 2022-02-28 PROCEDURE — 93010 ELECTROCARDIOGRAM REPORT: CPT

## 2022-02-28 PROCEDURE — 92928 PRQ TCAT PLMT NTRAC ST 1 LES: CPT | Mod: LD

## 2022-02-28 PROCEDURE — 99152 MOD SED SAME PHYS/QHP 5/>YRS: CPT

## 2022-02-28 PROCEDURE — 93571 IV DOP VEL&/PRESS C FLO 1ST: CPT | Mod: 26,LD

## 2022-02-28 PROCEDURE — 99233 SBSQ HOSP IP/OBS HIGH 50: CPT

## 2022-02-28 RX ORDER — CLOPIDOGREL BISULFATE 75 MG/1
75 TABLET, FILM COATED ORAL DAILY
Refills: 0 | Status: DISCONTINUED | OUTPATIENT
Start: 2022-03-01 | End: 2022-03-01

## 2022-02-28 RX ADMIN — Medication 180 MILLIGRAM(S): at 05:31

## 2022-02-28 RX ADMIN — Medication 3: at 12:11

## 2022-02-28 RX ADMIN — Medication 1: at 08:01

## 2022-02-28 RX ADMIN — INSULIN GLARGINE 24 UNIT(S): 100 INJECTION, SOLUTION SUBCUTANEOUS at 22:16

## 2022-02-28 RX ADMIN — Medication 50 MILLIGRAM(S): at 05:31

## 2022-02-28 RX ADMIN — HEPARIN SODIUM 700 UNIT(S)/HR: 5000 INJECTION INTRAVENOUS; SUBCUTANEOUS at 13:47

## 2022-02-28 RX ADMIN — ATORVASTATIN CALCIUM 80 MILLIGRAM(S): 80 TABLET, FILM COATED ORAL at 21:11

## 2022-02-28 RX ADMIN — HEPARIN SODIUM 700 UNIT(S)/HR: 5000 INJECTION INTRAVENOUS; SUBCUTANEOUS at 06:46

## 2022-02-28 RX ADMIN — Medication 81 MILLIGRAM(S): at 12:14

## 2022-02-28 NOTE — PROVIDER CONTACT NOTE (OTHER) - ACTION/TREATMENT ORDERED:
Provider made aware, continue to monitor as pt is asymptomatic, will notify day team and cardiology
Provider made aware, follow heparin nomogram protocol
Provider made aware, ok to give
ACP Tania Lara notified and made aware. As per ACP, follow nomogram.

## 2022-02-28 NOTE — PROVIDER CONTACT NOTE (OTHER) - ASSESSMENT
Pt a&ox4. Vss. Pt heparin gtt at 6mL/hr. aPTT was previously therapeutic x2, now subtherapeutic outside of ACS nomogram range.
Pt received in bed, sleeping. BP: 159/77, HR 69, no c/o chest pain, palpitations, acute distress, or SOB. Pt is asymptomatic.
Pt asleep, VSS, no s/s of bleeding
BP: 161/73, HR 60-62, no c/o chest pain, SOB, or acute distress

## 2022-02-28 NOTE — PROVIDER CONTACT NOTE (OTHER) - BACKGROUND
Pt admitted for chest pain, NSTEMI. PMH: CHF, CAD, DM
Pt admitted for chest pain, NSTEMI. PMH: DM, CHF, CAD
Pt admitted for chest pain, NSTEMI. PMH: CHF, CAD, DM
Pt admitted w/NSTEMI. PMH CHF, stented coronary artery, AFib, CAD, DM.

## 2022-02-28 NOTE — PROVIDER CONTACT NOTE (OTHER) - RECOMMENDATIONS
Clarify with provider, ok to give?
Notify provider. Scan and follow nomogram instructions?
Notify provider
Notify provider, follow heparin nomogram protocol

## 2022-03-01 ENCOUNTER — TRANSCRIPTION ENCOUNTER (OUTPATIENT)
Age: 67
End: 2022-03-01

## 2022-03-01 VITALS
OXYGEN SATURATION: 98 % | HEART RATE: 68 BPM | SYSTOLIC BLOOD PRESSURE: 148 MMHG | RESPIRATION RATE: 18 BRPM | DIASTOLIC BLOOD PRESSURE: 72 MMHG

## 2022-03-01 LAB
ANION GAP SERPL CALC-SCNC: 16 MMOL/L — SIGNIFICANT CHANGE UP (ref 5–17)
APTT BLD: 55.1 SEC — HIGH (ref 27.5–35.5)
BUN SERPL-MCNC: 18 MG/DL — SIGNIFICANT CHANGE UP (ref 7–23)
CALCIUM SERPL-MCNC: 8.9 MG/DL — SIGNIFICANT CHANGE UP (ref 8.4–10.5)
CHLORIDE SERPL-SCNC: 97 MMOL/L — SIGNIFICANT CHANGE UP (ref 96–108)
CO2 SERPL-SCNC: 20 MMOL/L — LOW (ref 22–31)
CREAT SERPL-MCNC: 1.1 MG/DL — SIGNIFICANT CHANGE UP (ref 0.5–1.3)
EGFR: 74 ML/MIN/1.73M2 — SIGNIFICANT CHANGE UP
GLUCOSE BLDC GLUCOMTR-MCNC: 117 MG/DL — HIGH (ref 70–99)
GLUCOSE BLDC GLUCOMTR-MCNC: 178 MG/DL — HIGH (ref 70–99)
GLUCOSE BLDC GLUCOMTR-MCNC: 237 MG/DL — HIGH (ref 70–99)
GLUCOSE SERPL-MCNC: 370 MG/DL — HIGH (ref 70–99)
HCT VFR BLD CALC: 42.1 % — SIGNIFICANT CHANGE UP (ref 39–50)
HCT VFR BLD CALC: 47 % — SIGNIFICANT CHANGE UP (ref 39–50)
HGB BLD-MCNC: 15.1 G/DL — SIGNIFICANT CHANGE UP (ref 13–17)
HGB BLD-MCNC: 16.7 G/DL — SIGNIFICANT CHANGE UP (ref 13–17)
MAGNESIUM SERPL-MCNC: 1.9 MG/DL — SIGNIFICANT CHANGE UP (ref 1.6–2.6)
MCHC RBC-ENTMCNC: 31 PG — SIGNIFICANT CHANGE UP (ref 27–34)
MCHC RBC-ENTMCNC: 31.7 PG — SIGNIFICANT CHANGE UP (ref 27–34)
MCHC RBC-ENTMCNC: 35.5 GM/DL — SIGNIFICANT CHANGE UP (ref 32–36)
MCHC RBC-ENTMCNC: 35.9 GM/DL — SIGNIFICANT CHANGE UP (ref 32–36)
MCV RBC AUTO: 87.2 FL — SIGNIFICANT CHANGE UP (ref 80–100)
MCV RBC AUTO: 88.4 FL — SIGNIFICANT CHANGE UP (ref 80–100)
NRBC # BLD: 0 /100 WBCS — SIGNIFICANT CHANGE UP (ref 0–0)
NRBC # BLD: 0 /100 WBCS — SIGNIFICANT CHANGE UP (ref 0–0)
PLATELET # BLD AUTO: 195 K/UL — SIGNIFICANT CHANGE UP (ref 150–400)
PLATELET # BLD AUTO: 202 K/UL — SIGNIFICANT CHANGE UP (ref 150–400)
POTASSIUM SERPL-MCNC: 3.6 MMOL/L — SIGNIFICANT CHANGE UP (ref 3.5–5.3)
POTASSIUM SERPL-SCNC: 3.6 MMOL/L — SIGNIFICANT CHANGE UP (ref 3.5–5.3)
RBC # BLD: 4.76 M/UL — SIGNIFICANT CHANGE UP (ref 4.2–5.8)
RBC # BLD: 5.39 M/UL — SIGNIFICANT CHANGE UP (ref 4.2–5.8)
RBC # FLD: 12.6 % — SIGNIFICANT CHANGE UP (ref 10.3–14.5)
RBC # FLD: 12.7 % — SIGNIFICANT CHANGE UP (ref 10.3–14.5)
SODIUM SERPL-SCNC: 133 MMOL/L — LOW (ref 135–145)
WBC # BLD: 8.52 K/UL — SIGNIFICANT CHANGE UP (ref 3.8–10.5)
WBC # BLD: 8.61 K/UL — SIGNIFICANT CHANGE UP (ref 3.8–10.5)
WBC # FLD AUTO: 8.52 K/UL — SIGNIFICANT CHANGE UP (ref 3.8–10.5)
WBC # FLD AUTO: 8.61 K/UL — SIGNIFICANT CHANGE UP (ref 3.8–10.5)

## 2022-03-01 PROCEDURE — 93306 TTE W/DOPPLER COMPLETE: CPT | Mod: 26

## 2022-03-01 PROCEDURE — 85027 COMPLETE CBC AUTOMATED: CPT

## 2022-03-01 PROCEDURE — 99285 EMERGENCY DEPT VISIT HI MDM: CPT | Mod: 25

## 2022-03-01 PROCEDURE — 93571 IV DOP VEL&/PRESS C FLO 1ST: CPT | Mod: LD

## 2022-03-01 PROCEDURE — U0003: CPT

## 2022-03-01 PROCEDURE — C1874: CPT

## 2022-03-01 PROCEDURE — 99153 MOD SED SAME PHYS/QHP EA: CPT

## 2022-03-01 PROCEDURE — 80061 LIPID PANEL: CPT

## 2022-03-01 PROCEDURE — 36415 COLL VENOUS BLD VENIPUNCTURE: CPT

## 2022-03-01 PROCEDURE — 71045 X-RAY EXAM CHEST 1 VIEW: CPT

## 2022-03-01 PROCEDURE — C1887: CPT

## 2022-03-01 PROCEDURE — 83036 HEMOGLOBIN GLYCOSYLATED A1C: CPT

## 2022-03-01 PROCEDURE — 82962 GLUCOSE BLOOD TEST: CPT

## 2022-03-01 PROCEDURE — 85025 COMPLETE CBC W/AUTO DIFF WBC: CPT

## 2022-03-01 PROCEDURE — 96374 THER/PROPH/DIAG INJ IV PUSH: CPT

## 2022-03-01 PROCEDURE — 83735 ASSAY OF MAGNESIUM: CPT

## 2022-03-01 PROCEDURE — 99152 MOD SED SAME PHYS/QHP 5/>YRS: CPT

## 2022-03-01 PROCEDURE — 93306 TTE W/DOPPLER COMPLETE: CPT

## 2022-03-01 PROCEDURE — C1894: CPT

## 2022-03-01 PROCEDURE — 85730 THROMBOPLASTIN TIME PARTIAL: CPT

## 2022-03-01 PROCEDURE — 84100 ASSAY OF PHOSPHORUS: CPT

## 2022-03-01 PROCEDURE — 80048 BASIC METABOLIC PNL TOTAL CA: CPT

## 2022-03-01 PROCEDURE — 82553 CREATINE MB FRACTION: CPT

## 2022-03-01 PROCEDURE — 93005 ELECTROCARDIOGRAM TRACING: CPT

## 2022-03-01 PROCEDURE — 85610 PROTHROMBIN TIME: CPT

## 2022-03-01 PROCEDURE — C9600: CPT | Mod: LD

## 2022-03-01 PROCEDURE — 84484 ASSAY OF TROPONIN QUANT: CPT

## 2022-03-01 PROCEDURE — C1769: CPT

## 2022-03-01 PROCEDURE — U0005: CPT

## 2022-03-01 PROCEDURE — 93454 CORONARY ARTERY ANGIO S&I: CPT | Mod: 59

## 2022-03-01 PROCEDURE — 80053 COMPREHEN METABOLIC PANEL: CPT

## 2022-03-01 RX ORDER — RIVAROXABAN 15 MG-20MG
20 KIT ORAL
Refills: 0 | Status: DISCONTINUED | OUTPATIENT
Start: 2022-03-01 | End: 2022-03-01

## 2022-03-01 RX ORDER — HEPARIN SODIUM 5000 [USP'U]/ML
600 INJECTION INTRAVENOUS; SUBCUTANEOUS
Qty: 25000 | Refills: 0 | Status: DISCONTINUED | OUTPATIENT
Start: 2022-03-01 | End: 2022-03-01

## 2022-03-01 RX ORDER — MAGNESIUM SULFATE 500 MG/ML
2 VIAL (ML) INJECTION ONCE
Refills: 0 | Status: COMPLETED | OUTPATIENT
Start: 2022-03-01 | End: 2022-03-01

## 2022-03-01 RX ORDER — POTASSIUM CHLORIDE 20 MEQ
40 PACKET (EA) ORAL ONCE
Refills: 0 | Status: COMPLETED | OUTPATIENT
Start: 2022-03-01 | End: 2022-03-01

## 2022-03-01 RX ORDER — CLOPIDOGREL BISULFATE 75 MG/1
1 TABLET, FILM COATED ORAL
Qty: 30 | Refills: 0
Start: 2022-03-01 | End: 2022-03-30

## 2022-03-01 RX ORDER — ATORVASTATIN CALCIUM 80 MG/1
1 TABLET, FILM COATED ORAL
Qty: 30 | Refills: 0
Start: 2022-03-01 | End: 2022-03-30

## 2022-03-01 RX ADMIN — Medication 2: at 12:28

## 2022-03-01 RX ADMIN — Medication 25 GRAM(S): at 09:29

## 2022-03-01 RX ADMIN — RIVAROXABAN 20 MILLIGRAM(S): KIT at 17:38

## 2022-03-01 RX ADMIN — HEPARIN SODIUM 600 UNIT(S)/HR: 5000 INJECTION INTRAVENOUS; SUBCUTANEOUS at 01:28

## 2022-03-01 RX ADMIN — Medication 50 MILLIGRAM(S): at 05:14

## 2022-03-01 RX ADMIN — Medication 40 MILLIEQUIVALENT(S): at 08:33

## 2022-03-01 RX ADMIN — HEPARIN SODIUM 600 UNIT(S)/HR: 5000 INJECTION INTRAVENOUS; SUBCUTANEOUS at 08:46

## 2022-03-01 RX ADMIN — CLOPIDOGREL BISULFATE 75 MILLIGRAM(S): 75 TABLET, FILM COATED ORAL at 11:15

## 2022-03-01 RX ADMIN — Medication 81 MILLIGRAM(S): at 11:15

## 2022-03-01 RX ADMIN — Medication 180 MILLIGRAM(S): at 05:15

## 2022-03-01 RX ADMIN — Medication 1: at 17:28

## 2022-03-01 NOTE — DISCHARGE NOTE PROVIDER - NSDCCPCAREPLAN_GEN_ALL_CORE_FT
PRINCIPAL DISCHARGE DIAGNOSIS  Diagnosis: NSTEMI (non-ST elevation myocardial infarction)  Assessment and Plan of Treatment: - Had cardiac cath and got stent in LAD  - continue DAPT (Aspirin and Plavix) with Xarelto for 1 week until 3/6. Then stop ASA afterwards and continue dual therapy w/ Aspirin and plavix. Monitor for signs and symptoms of bleeding  - continue high dose statin  - continue b-blocker  - Need outpt cardiology follow up.        SECONDARY DISCHARGE DIAGNOSES  Diagnosis: Paroxysmal atrial fibrillation  Assessment and Plan of Treatment: - Continue with xarelto and beta blocker  - Outpatient cardiology follow up    Diagnosis: (HFpEF) heart failure with preserved ejection fraction  Assessment and Plan of Treatment: - Stable on exam   - Outpatient cardiology follow up    Diagnosis: T2DM (type 2 diabetes mellitus)  Assessment and Plan of Treatment: HgA1C this admission was 8.8  Make sure you get your HgA1c checked every three months.  If you take oral diabetes medications, check your blood glucose two times a day.  If you take insulin, check your blood glucose before meals and at bedtime.  It's important not to skip any meals.  Keep a log of your blood glucose results and always take it with you to your doctor appointments.  Keep a list of your current medications including injectables and over the counter medications and bring this medication list with you to all your doctor appointments.  If you have not seen your ophthalmologist this year call for appointment.  Check your feet daily for redness, sores, or openings. Do not self treat. If no improvement in two days call your primary care physician for an appointment.  Low blood sugar (hypoglycemia) is a blood sugar below 70mg/dl. Check your blood sugar if you feel signs/symptoms of hypoglycemia. If your blood sugar is below 70 take 15 grams of carbohydrates (ex 4 oz of apple juice, 3-4 glucose tablets, or 4-6 oz of regular soda) wait 15 minutes and repeat blood sugar to make sure it comes up above 70.  If your blood sugar is above 70 and you are due for a meal, have a meal.  If you are not due for a meal have a snack.  This snack helps keeps your blood sugar at a safe range.      Diagnosis: HTN (hypertension)  Assessment and Plan of Treatment: Low salt diet  Activity as tolerated.  Take all medication as prescribed.  Follow up with your medical doctor for routine blood pressure monitoring at your next visit.  Notify your doctor if you have any of the following symptoms:   Dizziness, Lightheadedness, Blurry vision, Headache, Chest pain, Shortness of breath

## 2022-03-01 NOTE — DISCHARGE NOTE NURSING/CASE MANAGEMENT/SOCIAL WORK - NSDCPEFALRISK_GEN_ALL_CORE
For information on Fall & Injury Prevention, visit: [email or web address] OR  [email or web address] OR  https://www.cdc.gov/steadi/patient.html

## 2022-03-01 NOTE — PROGRESS NOTE ADULT - ASSESSMENT
66yo male with PMHx CAD s/p PCI D1 (12.21.15) and pLAD (5/2/16), pAfib on Xarelto, HTN, HLD, T2DM, HFpEF, who presented for one month of chest pain with exertion. ED spoke to patient's Cardiologist (Dr. Alfred Moon) who wanted house Cardiology to be consulted for a cath.     1. Typical Chest Pain  - One month of anginal sxs which improve with rest  - Reports having normal stress test and echo in past 1-2 weeks, please obtain these records   - Trend hsTrop to peak  - Hold Xarelto for [facility] Monday (last dose 2/25) and continue heparin gtt  - Continue Aspirin 81mg, Atorvastatin 80mg QHS  - TTE PENDING  - Monitor on Telemetry  - Keep K > 4, Mag > 2    2. Paroxysmal Afib  - EKG NSR on admission   - Continue Toprol 50 QD  - Hold Xarelto as above, currently on Heparin gtt    3. HFpEF  - Euvolemic on exam  - Follow up TTE     Cary Fisher MD  Cardiology Fellow, PGY-5  [phone]  For all other Cardiology service contact information, go to [email or web address] and use "cardfellows" to login.
66yo male with PMHx CAD s/p PCI D1 (12.21.15) and pLAD (5/2/16), pAfib on Xarelto, HTN, HLD, T2DM, HFpEF, who presented from PCPs with typical chest pain found to have NSTEMI      Problem/Plan - 1:  ·  Problem: NSTEMI (non-ST elevation myocardial infarction).   ·  Plan: -Typical chest pain likely 2/2 to NSTEMI   -s/p ASA loading in ED  -c/w ASA 81mg, Atorvastatin 80mg qhs  - s/p LHC : stent to mid LAD         Problem/Plan - 2:  ·  Problem: Paroxysmal atrial fibrillation.   ·  Plan: -rate-controlled  -c/w Metoprolol and diltiazem  -hold Xarelto and c/w heparin gtt.- restart xarelto in 24 hrs     Problem/Plan - 3:  ·  Problem: (HFpEF) heart failure with preserved ejection fraction.   ·  Plan: -euvolemic on exam      Problem/Plan - 4:  ·  Problem: HTN (hypertension).   ·  Plan: -c/w metoprolol and diltiazem.    Problem/Plan - 5:  ·  Problem: T2DM (type 2 diabetes mellitus).   ·  Plan: -at home on Lantus 30-32u qhs  -will start on Lantus 24u qhs  -FS AC TID HS  -AISS  -f/u A1C.    Problem/Plan - 6:  ·  Problem: Prophylactic measure.     STABLE For dc   d/w pt at length   d/w wife   d/w NP   
66yo male with PMHx CAD s/p PCI D1 (12.21.15) and pLAD (5/2/16), pAfib on Xarelto, HTN, HLD, T2DM, HFpEF, who presented from PCPs with typical chest pain found to have NSTEMI      Problem/Plan - 1:  ·  Problem: NSTEMI (non-ST elevation myocardial infarction).   ·  Plan: -Typical chest pain likely 2/2 to NSTEMI   -s/p ASA loading in ED  -c/w ASA 81mg, Atorvastatin 80mg qhs  - s/p LHC : stent to mid LAD   - heparin for 48 hrs and dc       Problem/Plan - 2:  ·  Problem: Paroxysmal atrial fibrillation.   ·  Plan: -rate-controlled  -c/w Metoprolol and diltiazem  -hold Xarelto and c/w heparin gtt.- restart xarelto in 24 hrs     Problem/Plan - 3:  ·  Problem: (HFpEF) heart failure with preserved ejection fraction.   ·  Plan: -euvolemic on exam  -f/u TTE.    Problem/Plan - 4:  ·  Problem: HTN (hypertension).   ·  Plan: -c/w metoprolol and diltiazem.    Problem/Plan - 5:  ·  Problem: T2DM (type 2 diabetes mellitus).   ·  Plan: -at home on Lantus 30-32u qhs  -will start on Lantus 24u qhs  -FS AC TID HS  -AISS  -f/u A1C.    Problem/Plan - 6:  ·  Problem: Prophylactic measure.   ·  Plan: -Diet: DASH/TLC/CC  -DVT ppx: Heparin gtt.  
67M w/ CAD, pAF on xarelto, HTN, HLD, T2DM, and HFpEF, now s/p LHC with TITO to mLAD.    # s/p TITO in mLAD  - R radial site benign, no hematoma or ecchymosis  - continue DAPT (Aspirin and Plavix) with Xarelto for 1 week. Stop ASA afterwards and continue dual therapy w/ DOAC and P2Y12 inhibitor. Monitor for signs and symptoms of bleeding  - continue high dose statin  - continue b-blocker  - care per primary team and gen cardiology.  - Need outpt cardiology follow up.    Jareth Barillas MD  Cardiology Fellow - PGY 4  Text or Call: [phone]  For all New Consults and Questions:  [email or web address]   Login: [email or web address]

## 2022-03-01 NOTE — DISCHARGE NOTE PROVIDER - NSDCFUADDAPPT_GEN_ALL_CORE_FT
Follow up with your cardiology or Dr. Dominguez in 1 week after discharge.  Follow up with your cardiology in 1 week after discharge.

## 2022-03-01 NOTE — PROGRESS NOTE ADULT - SUBJECTIVE AND OBJECTIVE BOX
Date of service: 02-28-22 @ 23:09      Patient is a 67y old  Male who presents with a chief complaint of [facility] (27 Feb 2022 12:13)                                                               INTERVAL HPI/OVERNIGHT EVENTS:    REVIEW OF SYSTEMS:     CONSTITUTIONAL: No weakness, fevers or chills  EYES/ENT: No visual changes , no ear ache   NECK: No pain or stiffness  RESPIRATORY: No cough, wheezing,  No shortness of breath  CARDIOVASCULAR: No chest pain or palpitations  GASTROINTESTINAL: No abdominal pain  . No nausea, vomiting, or hematemesis; No diarrhea or constipation. No melena or hematochezia.  GENITOURINARY: No dysuria, frequency or hematuria  NEUROLOGICAL: No numbness or weakness  SKIN: No itching, burning, rashes, or lesions                                                                                                                                                                                                                                                                                 Medications:  MEDICATIONS  (STANDING):  aspirin enteric coated 81 milliGRAM(s) Oral daily  atorvastatin 80 milliGRAM(s) Oral at bedtime  dextrose 40% Gel 15 Gram(s) Oral once  dextrose 5%. 1000 milliLiter(s) (100 mL/Hr) IV Continuous <Continuous>  dextrose 50% Injectable 25 Gram(s) IV Push once  diltiazem    milliGRAM(s) Oral daily  glucagon  Injectable 1 milliGRAM(s) IntraMuscular once  heparin  Infusion. 600 Unit(s)/Hr (6 mL/Hr) IV Continuous <Continuous>  insulin glargine Injectable (LANTUS) 24 Unit(s) SubCutaneous at bedtime  insulin lispro (ADMELOG) corrective regimen sliding scale   SubCutaneous three times a day before meals  metoprolol succinate ER 50 milliGRAM(s) Oral daily    MEDICATIONS  (PRN):  acetaminophen     Tablet .. 650 milliGRAM(s) Oral every 6 hours PRN Temp greater or equal to 38C (100.4F), Mild Pain (1 - 3)  aluminum hydroxide/magnesium hydroxide/simethicone Suspension 30 milliLiter(s) Oral every 4 hours PRN Dyspepsia  melatonin 3 milliGRAM(s) Oral at bedtime PRN Insomnia  ondansetron Injectable 4 milliGRAM(s) IV Push every 8 hours PRN Nausea and/or Vomiting       Allergies    No Known Allergies    Intolerances      Vital Signs Last 24 Hrs  T(C): 36.5 (28 Feb 2022 14:50), Max: 36.6 (28 Feb 2022 05:23)  T(F): 97.7 (28 Feb 2022 14:50), Max: 97.9 (28 Feb 2022 05:23)  HR: 60 (28 Feb 2022 20:40) (54 - 68)  BP: 155/70 (28 Feb 2022 20:40) (130/68 - 168/72)  BP(mean): --  RR: 18 (28 Feb 2022 20:40) (14 - 18)  SpO2: 96% (28 Feb 2022 20:40) (93% - 99%)  CAPILLARY BLOOD GLUCOSE      POCT Blood Glucose.: 181 mg/dL (28 Feb 2022 21:53)  POCT Blood Glucose.: 142 mg/dL (28 Feb 2022 18:05)  POCT Blood Glucose.: 286 mg/dL (28 Feb 2022 11:39)  POCT Blood Glucose.: 170 mg/dL (28 Feb 2022 07:38)      02-27 @ 07:01  -  02-28 @ 07:00  --------------------------------------------------------  IN: 1020 mL / OUT: 1350 mL / NET: -330 mL      Physical Exam:    Daily Height in cm: 167.6 (28 Feb 2022 14:50)    Daily   General:  Well appearing, NAD, not cachetic  HEENT:  Nonicteric, PERRLA  CV:  RRR, S1S2   Lungs:  CTA B/L, no wheezes, rales, rhonchi  Abdomen:  Soft, non-tender, no distended, positive BS  Extremities:  2+ pulses, no c/c, no edema  Skin:  Warm and dry, no rashes  :  No moreno  Neuro:  AAOx3, non-focal, grossly intact                                                                                                                                                                                                                                                                                                LABS:                               15.0   8.80  )-----------( 194      ( 28 Feb 2022 05:56 )             42.8                      02-28    139  |  106  |  20  ----------------------------<  148<H>  4.0   |  17<L>  |  1.41<H>    Ca    9.1      28 Feb 2022 05:56  Phos  2.6     02-27  Mg     2.0     02-27    TPro  5.9<L>  /  Alb  3.8  /  TBili  1.3<H>  /  DBili  x   /  AST  22  /  ALT  13  /  AlkPhos  118  02-27                       RADIOLOGY & ADDITIONAL TESTS         I personally reviewed: [  ]EKG   [  ]CXR    [  ] CT      A/P:         Discussed with :     Derek consultants' Notes   Time spent :  
Patient is a 67y old  Male who presents with a chief complaint of [facility] (28 Feb 2022 22:55)      SUBJECTIVE / OVERNIGHT EVENTS:  No acute events overnight.  Patient remains asymptomatic this AM.   Patient denies CP, palpitation, SOB, visaul disturbance, dizziness, lightheadedness, abdominal pain, n/v/d/c, dysusria/hematuria.  right radial site without bleeding or hematoma per nursing staff and patient. he denies any distal pain, swelling, paresthesia, numbness, or weakness.    MEDICATIONS  (STANDING):  aspirin enteric coated 81 milliGRAM(s) Oral daily  atorvastatin 80 milliGRAM(s) Oral at bedtime  clopidogrel Tablet 75 milliGRAM(s) Oral daily  dextrose 40% Gel 15 Gram(s) Oral once  dextrose 5%. 1000 milliLiter(s) (100 mL/Hr) IV Continuous <Continuous>  dextrose 50% Injectable 25 Gram(s) IV Push once  diltiazem    milliGRAM(s) Oral daily  glucagon  Injectable 1 milliGRAM(s) IntraMuscular once  insulin glargine Injectable (LANTUS) 24 Unit(s) SubCutaneous at bedtime  insulin lispro (ADMELOG) corrective regimen sliding scale   SubCutaneous three times a day before meals  metoprolol succinate ER 50 milliGRAM(s) Oral daily    MEDICATIONS  (PRN):  acetaminophen     Tablet .. 650 milliGRAM(s) Oral every 6 hours PRN Temp greater or equal to 38C (100.4F), Mild Pain (1 - 3)  aluminum hydroxide/magnesium hydroxide/simethicone Suspension 30 milliLiter(s) Oral every 4 hours PRN Dyspepsia  melatonin 3 milliGRAM(s) Oral at bedtime PRN Insomnia  ondansetron Injectable 4 milliGRAM(s) IV Push every 8 hours PRN Nausea and/or Vomiting      T(C): 36.4 (03-01-22 @ 04:22), Max: 36.8 (02-28-22 @ 21:40)  HR: 63 (03-01-22 @ 04:22) (54 - 74)  BP: 149/79 (03-01-22 @ 04:22) (130/68 - 171/83)  RR: 18 (03-01-22 @ 04:22) (14 - 18)  SpO2: 94% (03-01-22 @ 04:22) (93% - 100%)  CAPILLARY BLOOD GLUCOSE      POCT Blood Glucose.: 117 mg/dL (01 Mar 2022 08:15)  POCT Blood Glucose.: 181 mg/dL (28 Feb 2022 21:53)  POCT Blood Glucose.: 142 mg/dL (28 Feb 2022 18:05)  POCT Blood Glucose.: 286 mg/dL (28 Feb 2022 11:39)    I&O's Summary    28 Feb 2022 07:01  -  01 Mar 2022 07:00  --------------------------------------------------------  IN: 180 mL / OUT: 675 mL / NET: -495 mL        PHYSICAL EXAM:  PHYSICAL EXAM:    Constitutional: NAD.   HEENT: AT/NC, EOMI, Supple neck;  Respiratory: no wheezing or crackles. no increase in WOB  Cardiovascular: RRR, S1, S2, no M/R/G. 2+ distal pulses. no JVD, no LE edema.  Gastrointestinal: soft; NT/ND, +BS  Extremities: no cyanosis; non-tender to palpation, DP and Radial pulses intact.  Neurological: A&Ox 3;  Psychiatric: normal mood/affect.  Procedure site(RRA): benign. no hematoma, no bruising, good distal pulses. distal sensory and motor function intact.    LABS:                        16.7   8.52  )-----------( 202      ( 01 Mar 2022 07:34 )             47.0     03-01    133<L>  |  97  |  18  ----------------------------<  370<H>  3.6   |  20<L>  |  1.10    Ca    8.9      01 Mar 2022 05:49  Mg     1.9     03-01      PT/INR - ( 28 Feb 2022 05:56 )   PT: 17.5 sec;   INR: 1.45 ratio         PTT - ( 01 Mar 2022 07:34 )  PTT:55.1 sec          RADIOLOGY & ADDITIONAL TESTS:    Imaging Personally Reviewed: CECILIO    Consultant(s) Notes Reviewed:  CECILIO    Care Discussed with Consultants/Other Providers: CECILIO  
Patient seen and examined at bedside.    Overnight Events: Denies any CP or SOB.     Current Meds:  acetaminophen     Tablet .. 650 milliGRAM(s) Oral every 6 hours PRN  aluminum hydroxide/magnesium hydroxide/simethicone Suspension 30 milliLiter(s) Oral every 4 hours PRN  aspirin enteric coated 81 milliGRAM(s) Oral daily  atorvastatin 80 milliGRAM(s) Oral at bedtime  dextrose 40% Gel 15 Gram(s) Oral once  dextrose 5%. 1000 milliLiter(s) IV Continuous <Continuous>  dextrose 50% Injectable 25 Gram(s) IV Push once  diltiazem    milliGRAM(s) Oral daily  glucagon  Injectable 1 milliGRAM(s) IntraMuscular once  heparin  Infusion. 600 Unit(s)/Hr IV Continuous <Continuous>  insulin glargine Injectable (LANTUS) 24 Unit(s) SubCutaneous at bedtime  insulin lispro (ADMELOG) corrective regimen sliding scale   SubCutaneous three times a day before meals  melatonin 3 milliGRAM(s) Oral at bedtime PRN  metoprolol succinate ER 50 milliGRAM(s) Oral daily  ondansetron Injectable 4 milliGRAM(s) IV Push every 8 hours PRN      Vitals:  T(F): 98.6 (02-27), Max: 98.6 (02-26)  HR: 61 (02-27) (60 - 90)  BP: 136/72 (02-27) (129/74 - 163/72)  RR: 18 (02-27)  SpO2: 99% (02-27)  I&O's Summary    26 Feb 2022 07:01  -  27 Feb 2022 07:00  --------------------------------------------------------  IN: 90 mL / OUT: 0 mL / NET: 90 mL    27 Feb 2022 07:01  -  27 Feb 2022 12:14  --------------------------------------------------------  IN: 360 mL / OUT: 300 mL / NET: 60 mL        Physical Exam:  Appearance: No acute distress; well appearing  Eyes:  EOMI, pink conjunctiva  HENT: Normal oral mucosa  Cardiovascular: RRR, S1, S2, no murmurs, rubs, or gallops; no edema; no JVD  Respiratory: Clear to auscultation bilaterally  Gastrointestinal: soft, non-tender, non-distended with normal bowel sounds  Musculoskeletal: No clubbing; no joint deformity   Neurologic: Non-focal  Lymphatic: No lymphadenopathy  Psychiatry: AAOx3, mood & affect appropriate  Skin: No rashes                          17.1   9.69  )-----------( 229      ( 27 Feb 2022 09:55 )             49.0     02-27    138  |  103  |  17  ----------------------------<  191<H>  4.0   |  23  |  1.12    Ca    9.0      27 Feb 2022 06:39  Phos  2.6     02-27  Mg     2.0     02-27    TPro  5.9<L>  /  Alb  3.8  /  TBili  1.3<H>  /  DBili  x   /  AST  22  /  ALT  13  /  AlkPhos  118  02-27    PT/INR - ( 26 Feb 2022 16:53 )   PT: 22.0 sec;   INR: 1.82 ratio         PTT - ( 27 Feb 2022 09:55 )  PTT:61.1 sec  CARDIAC MARKERS ( 27 Feb 2022 06:39 )  199 ng/L / x     / x     / x     / x     / x      CARDIAC MARKERS ( 26 Feb 2022 21:53 )  204 ng/L / x     / x     / x     / x     / x      CARDIAC MARKERS ( 26 Feb 2022 16:53 )  195 ng/L / x     / x     / x     / x     / 15.8 ng/mL    New ECG(s): Personally reviewed    Echo: PENDING    Stress Testing:     Cath:    Imaging:    Interpretation of Telemetry: NSR 50-80
Date of service: 03-01-22 @ 23:25      Patient is a 67y old  Male who presents with a chief complaint of [facility] (01 Mar 2022 11:50)                                                               INTERVAL HPI/OVERNIGHT EVENTS:    REVIEW OF SYSTEMS:     CONSTITUTIONAL: No weakness, fevers or chills  EYES/ENT: No visual changes , no ear ache   NECK: No pain or stiffness  RESPIRATORY: No cough, wheezing,  No shortness of breath  CARDIOVASCULAR: No chest pain or palpitations  GASTROINTESTINAL: No abdominal pain  . No nausea, vomiting, or hematemesis; No diarrhea or constipation. No melena or hematochezia.  GENITOURINARY: No dysuria, frequency or hematuria  NEUROLOGICAL: No numbness or weakness  SKIN: No itching, burning, rashes, or lesions                                                                                                                                                                                                                                                                                 Medications:  MEDICATIONS  (STANDING):  aspirin enteric coated 81 milliGRAM(s) Oral daily  atorvastatin 80 milliGRAM(s) Oral at bedtime  clopidogrel Tablet 75 milliGRAM(s) Oral daily  dextrose 40% Gel 15 Gram(s) Oral once  dextrose 5%. 1000 milliLiter(s) (100 mL/Hr) IV Continuous <Continuous>  dextrose 50% Injectable 25 Gram(s) IV Push once  diltiazem    milliGRAM(s) Oral daily  glucagon  Injectable 1 milliGRAM(s) IntraMuscular once  insulin glargine Injectable (LANTUS) 24 Unit(s) SubCutaneous at bedtime  insulin lispro (ADMELOG) corrective regimen sliding scale   SubCutaneous three times a day before meals  metoprolol succinate ER 50 milliGRAM(s) Oral daily  rivaroxaban 20 milliGRAM(s) Oral with dinner    MEDICATIONS  (PRN):  acetaminophen     Tablet .. 650 milliGRAM(s) Oral every 6 hours PRN Temp greater or equal to 38C (100.4F), Mild Pain (1 - 3)  aluminum hydroxide/magnesium hydroxide/simethicone Suspension 30 milliLiter(s) Oral every 4 hours PRN Dyspepsia  melatonin 3 milliGRAM(s) Oral at bedtime PRN Insomnia  ondansetron Injectable 4 milliGRAM(s) IV Push every 8 hours PRN Nausea and/or Vomiting       Allergies    No Known Allergies    Intolerances      Vital Signs Last 24 Hrs  T(C): 36.8 (01 Mar 2022 10:59), Max: 36.8 (01 Mar 2022 10:59)  T(F): 98.3 (01 Mar 2022 10:59), Max: 98.3 (01 Mar 2022 10:59)  HR: 68 (01 Mar 2022 11:58) (63 - 70)  BP: 148/72 (01 Mar 2022 11:58) (148/72 - 168/83)  BP(mean): --  RR: 18 (01 Mar 2022 11:58) (18 - 18)  SpO2: 98% (01 Mar 2022 11:58) (94% - 98%)  CAPILLARY BLOOD GLUCOSE      POCT Blood Glucose.: 178 mg/dL (01 Mar 2022 16:33)  POCT Blood Glucose.: 237 mg/dL (01 Mar 2022 11:53)  POCT Blood Glucose.: 117 mg/dL (01 Mar 2022 08:15)      02-28 @ 07:01 - 03-01 @ 07:00  --------------------------------------------------------  IN: 180 mL / OUT: 675 mL / NET: -495 mL    03-01 @ 07:01  - 03-01 @ 23:25  --------------------------------------------------------  IN: 960 mL / OUT: 900 mL / NET: 60 mL      Physical Exam:    Daily     Daily   General:  Well appearing, NAD, not cachetic  HEENT:  Nonicteric, PERRLA  CV:  RRR, S1S2   Lungs:  CTA B/L, no wheezes, rales, rhonchi  Abdomen:  Soft, non-tender, no distended, positive BS  Extremities:  2+ pulses, no c/c, no edema  Skin:  Warm and dry, no rashes  :  No moreno  Neuro:  AAOx3, non-focal, grossly intact                                                                                                                                                                                                                                                                                                LABS:                               16.7   8.52  )-----------( 202      ( 01 Mar 2022 07:34 )             47.0                      03-01    133<L>  |  97  |  18  ----------------------------<  370<H>  3.6   |  20<L>  |  1.10    Ca    8.9      01 Mar 2022 05:49  Mg     1.9     03-01                         RADIOLOGY & ADDITIONAL TESTS         I personally reviewed: [  ]EKG   [  ]CXR    [  ] CT      A/P:         Discussed with :     Derek consultants' Notes   Time spent :

## 2022-03-01 NOTE — DISCHARGE NOTE PROVIDER - CARE PROVIDER_API CALL
Julito Dominguez)  Cardiovascular Disease; Interventional Cardiology  [address] [place] [address]  Phone: [phone]  Fax: [phone]  Follow Up Time: 1 week   Alfred Moon (MD)  Cardiology; Internal Medicine  [address] [place] [address]  Phone: [phone]  Fax: [phone]  Follow Up Time: 1 week

## 2022-03-01 NOTE — DISCHARGE NOTE PROVIDER - NSDCMRMEDTOKEN_GEN_ALL_CORE_FT
aspirin 81 mg oral delayed release tablet: 1 tab(s) orally once a day  clopidogrel 75 mg oral tablet: 1 tab(s) orally once a day  dilTIAZem 180 mg/24 hours oral capsule, extended release: 1 cap(s) orally once a day  Lantus Solostar Pen 100 units/mL subcutaneous solution: 30-32 unit(s) subcutaneous once a day (at bedtime)  metoprolol succinate 50 mg oral tablet, extended release: 1 tab(s) orally once a day  Vitamin B-12: 1 tab(s) orally once a day  Vitamin C: 1 tab(s) orally once a day  Vitamin D3: 1 tab(s) orally once a day  Xarelto 20 mg oral tablet: 1 tab(s) orally once a day (before a meal)  Zinc 140 mg (as elemental zinc 50 mg) oral tablet: 1 tab(s) orally once a day   aspirin 81 mg oral delayed release tablet: 1 tab(s) orally once a day  atorvastatin 80 mg oral tablet: 1 tab(s) orally once a day (at bedtime)  clopidogrel 75 mg oral tablet: 1 tab(s) orally once a day  dilTIAZem 180 mg/24 hours oral capsule, extended release: 1 cap(s) orally once a day  Lantus Solostar Pen 100 units/mL subcutaneous solution: 30-32 unit(s) subcutaneous once a day (at bedtime)  metoprolol succinate 50 mg oral tablet, extended release: 1 tab(s) orally once a day  Vitamin B-12: 1 tab(s) orally once a day  Vitamin C: 1 tab(s) orally once a day  Vitamin D3: 1 tab(s) orally once a day  Xarelto 20 mg oral tablet: 1 tab(s) orally once a day (before a meal)  Zinc 140 mg (as elemental zinc 50 mg) oral tablet: 1 tab(s) orally once a day

## 2022-03-01 NOTE — DISCHARGE NOTE NURSING/CASE MANAGEMENT/SOCIAL WORK - PATIENT PORTAL LINK FT
You can access the FollowMyHealth Patient Portal offered by [facility] by registering at the following website: [email or web address]. By joining [facility]’s FollowMyHealth portal, you will also be able to view your health information using other applications (apps) compatible with our system.

## 2022-03-01 NOTE — DISCHARGE NOTE PROVIDER - HOSPITAL COURSE
67M w/ CAD, pAF on xarelto, HTN, HLD, T2DM, and HFpEF, now s/p LHC with TITO to mLAD on 2/28/22.     # s/p TITO in mLAD  - R radial site benign, no hematoma or ecchymosis  - continue DAPT (Aspirin and Plavix) with Xarelto for 1 week. Stop ASA afterwards and continue dual therapy w/ DOAC and P2Y12 inhibitor. Monitor for signs and symptoms of bleeding  - continue high dose statin  - continue b-blocker  - care per primary team and gen cardiology.  - Need outpt cardiology follow up.    Pt is medically stable and optimized to DC w/ outpatient follow up.

## 2022-03-01 NOTE — PHARMACOTHERAPY INTERVENTION NOTE - COMMENTS
Counseled patient on the following discharge medications names (brand/generic), indication, and possible side effects:  aspirin 81mg daily  Plavix 75mg daily  Xarelto 20mg daily     Pt s/p TITO to mLAD on DAPT with Xarelto (for AFib). Counseled patient to take all three medications for 1 week, then STOP aspirin and just continue with Plavix and Xarelto. Pt able to repeat back and demonstrated understanding. Patient was provided with a medication card for their new medication.    Plavix RX to sent to [facility] pharmacy; Copay is $3.34. Communicated with pt and LEANN Dacosta. Patient questions and concerns were answered and addressed. Patient demonstrated understanding. Patient understood importance of compliance.    Lorraine Fisher, PharmD, [facility]  Clinical Pharmacy Specialist  [phone] or Teams

## 2022-03-01 NOTE — DISCHARGE NOTE PROVIDER - PROVIDER TOKENS
PROVIDER:[TOKEN:[[id number]],FOLLOWUP:[1 week]] PROVIDER:[TOKEN:[[id number]],FOLLOWUP:[1 week]]

## 2022-08-03 NOTE — CONSULT NOTE ADULT - SUBJECTIVE AND OBJECTIVE BOX
HPI:  64M with PMHx of CAD (post-three stents), HTN, HLD, T2DM (complicated by peripheral neuropathy), afib on AC presenting with Le cellulitis /fever "not responding to abx " as o/p.  Pt was discharged last week  and since then noted some swelling in RLE and occasional pain... three days prior to admission was seen in ED and was found to have cellulitis of RLE and sent home on keflex. 1 day prior to admission pt had a follow up with cardio and was found to have fever and was told to come to ER. He was admittted to the medical service and treated for celulitis. His celulitis has now resolved but yesterday he noticed some left thigh swelling and redness. A CT was obtained which showed evidence of left thigh hematoma for which vascular surgery was consulted.      No Known Allergies      PAST MEDICAL & SURGICAL HISTORY:  Afib  Essential hypertension, hypertension with unspecified goal  Hyperlipidemia, unspecified hyperlipidemia type  Type 2 diabetes mellitus  CAD in native artery  Former smoker  s/p Carotid Endarterectomy: left        Home Medications:  amLODIPine 5 mg oral tablet: 1 tab(s) orally once a day (29 Aug 2019 19:05)  atorvastatin 80 mg oral tablet: 1 tab(s) orally once a day (29 Aug 2019 19:05)  fluticasone nasal: 1 spray(s) nasal 2 times a day, As Needed (29 Aug 2019 19:05)  furosemide 40 mg oral tablet: 1 tab(s) orally once a day (29 Aug 2019 19:05)  glimepiride 4 mg oral tablet: 1 tab(s) orally once a day (29 Aug 2019 18:55)  Januvia 100 mg oral tablet: 1 tab(s) orally once a day (29 Aug 2019 19:05)  Lantus Solostar Pen 100 units/mL subcutaneous solution: 28 to 32 unit(s) subcutaneous once a day (at bedtime) (29 Aug 2019 19:05)  magnesium oxide 400 mg (241.3 mg elemental magnesium) oral tablet: 1 tab(s) orally once a day (29 Aug 2019 19:05)  Metoprolol Succinate ER 25 mg oral tablet, extended release: 2 tab(s) orally 2 times a day (29 Aug 2019 19:05)  Qvar Redihaler 40 mcg/inh inhalation aerosol: 2 puff(s) inhaled 2 times a day, As Needed (29 Aug 2019 19:05)  ramipril 2.5 mg oral capsule: 1 cap(s) orally once a day (29 Aug 2019 19:05)  rivaroxaban 20 mg oral tablet: 1 tab(s) orally once a day (in the evening) (29 Aug 2019 19:05)  Tylenol Extra Strength 500 mg oral tablet: 2 tab(s) orally 2 times a day, As Needed (29 Aug 2019 19:05)  vitamin A: 1 tab(s) orally once a day (29 Aug 2019 19:05)  Vitamin B12:  (29 Aug 2019 19:05)  Vitamin B6: 1 tab(s) orally once a day (29 Aug 2019 19:05)  Vitamin C: 1 tab(s) orally once a day (29 Aug 2019 19:05)  Vitamin D3: 1 tab(s) orally once a day (29 Aug 2019 19:05)  Zinc 140 mg (as elemental zinc 50 mg) oral tablet: 1 tab(s) orally once a day (29 Aug 2019 19:05)      Social History:  ETOH:  TOB:  Illicits:  Work/Home:    FAMILY HISTORY:  Family history of heart disease: father  age 71      REVIEW OF SYSTEMS:    CONSTITUTIONAL: No weakness, fatigue, malaise, fevers or chills, no weight change, appetite change  EYES: No visual changes; No double vision,  No vertigo, eye pain  Ears: no otalgia, no otorhea, no hearing loss, tinnitus  Nose: no epistaxis, rhinorrhea, post-discharge, sinus pressure  Throat: no throat pain, no oral lesions, tooth pain   NECK: No pain or stiffness  RESPIRATORY: No cough (productive or dry), wheezing, hemoptysis; No shortness of breath, orthnopnea, PND, CLEARY, snoring,  CARDIOVASCULAR: No chest pain, irregularly irregular  GASTROINTESTINAL: No abdominal or epigastric pain  MUSCULOSKELETAL: left thigh with swelling and blanching erythema, pt/dp/fem/pop pulses 2+ bilaterally.  NEUROLOGICAL: No numbness or weakness           MEDICATIONS  (STANDING):  amLODIPine   Tablet 5 milliGRAM(s) Oral daily  aspirin enteric coated 81 milliGRAM(s) Oral daily  atorvastatin 80 milliGRAM(s) Oral at bedtime  benzonatate 100 milliGRAM(s) Oral three times a day  dextrose 5%. 1000 milliLiter(s) (50 mL/Hr) IV Continuous <Continuous>  dextrose 50% Injectable 12.5 Gram(s) IV Push once  dextrose 50% Injectable 25 Gram(s) IV Push once  dextrose 50% Injectable 25 Gram(s) IV Push once  diltiazem    milliGRAM(s) Oral daily  insulin glargine Injectable (LANTUS) 32 Unit(s) SubCutaneous at bedtime  insulin lispro (HumaLOG) corrective regimen sliding scale   SubCutaneous three times a day before meals  insulin lispro (HumaLOG) corrective regimen sliding scale   SubCutaneous at bedtime  insulin lispro Injectable (HumaLOG) 6 Unit(s) SubCutaneous three times a day before meals  metoprolol succinate ER 50 milliGRAM(s) Oral two times a day  rivaroxaban 15 milliGRAM(s) Oral with dinner  tamsulosin 0.4 milliGRAM(s) Oral at bedtime    MEDICATIONS  (PRN):  acetaminophen   Tablet .. 650 milliGRAM(s) Oral every 6 hours PRN Temp greater or equal to 38C (100.4F)  acetaminophen   Tablet .. 650 milliGRAM(s) Oral every 6 hours PRN Mild Pain (1 - 3)  benzocaine 15 mG/menthol 3.6 mG (Sugar-Free) Lozenge 1 Lozenge Oral four times a day PRN Sore Throat  dextrose 40% Gel 15 Gram(s) Oral once PRN Blood Glucose LESS THAN 70 milliGRAM(s)/deciliter  glucagon  Injectable 1 milliGRAM(s) IntraMuscular once PRN Glucose LESS THAN 70 milligrams/deciliter  guaiFENesin   Syrup  (Sugar-Free) 100 milliGRAM(s) Oral every 6 hours PRN Cough  oxyCODONE    5 mG/acetaminophen 325 mG 2 Tablet(s) Oral every 6 hours PRN Severe Pain (7 - 10)  traMADol 25 milliGRAM(s) Oral every 6 hours PRN Moderate Pain (4 - 6)        Vital Signs Last 24 Hrs  T(C): 36.5 (06 Sep 2019 12:22), Max: 37.7 (06 Sep 2019 03:40)  T(F): 97.7 (06 Sep 2019 12:22), Max: 99.9 (06 Sep 2019 03:40)  HR: 99 (06 Sep 2019 12:22) (92 - 106)  BP: 139/63 (06 Sep 2019 12:22) (122/65 - 139/63)  BP(mean): --  RR: 18 (06 Sep 2019 12:22) (16 - 18)  SpO2: 98% (06 Sep 2019 12:22) (97% - 98%)    General: NAD, Pleasant  Neurology: Patient is AA&Ox4, follows commands, and speech fluent. EOMI intact, PERRLA, 3mm--2mm. Sensation in the Trigeminal distribution is wnl and symmetrical. Facial muscles intact and symmetrical. Hearing appropriate. Uvula rises equally upon phonation and tongue protrudes symmetrically. SCM/Trapezius 5/5 power.  Neck: Neck supple, trachea midline, No JVD  Respiratory: CTA B/L, (-)rales, rhonchi  CV: S1S2, r/r/r, (-)m/r/g  Abdomen: S/NT/ND, BSx4  Extremities: 2+ peripheral pulses bilat throughout; (-)edema appreciated  Skin: No Rashes, Hematoma, Ecchymosis    LABS:                        10.4   14.7  )-----------( 352      ( 06 Sep 2019 05:58 )             32.7     09-    124<L>  |  90<L>  |  34<H>  ----------------------------<  441<H>  4.8   |  22  |  1.58<H>    Ca    9.3      06 Sep 2019 12:43  Mg     2.2     09-        Urinalysis Basic - ( 05 Sep 2019 18:46 )    Color: Yellow / Appearance: Clear / S.025 / pH: x  Gluc: x / Ketone: Negative  / Bili: Negative / Urobili: <2 mg/dL   Blood: x / Protein: 30 mg/dL / Nitrite: Negative   Leuk Esterase: Negative / RBC: 50 /HPF / WBC 3 /HPF   Sq Epi: x / Non Sq Epi: 2 /HPF / Bacteria: Negative                  RADIOLOGY & ADDITIONAL STUDIES:        ASSESSMENT:   64yMalePAST MEDICAL & SURGICAL HISTORY:  Afib  Essential hypertension, hypertension with unspecified goal  Hyperlipidemia, unspecified hyperlipidemia type  Type 2 diabetes mellitus  CAD in native artery  Former smoker  s/p Carotid Endarterectomy: left  HEALTH ISSUES - PROBLEM Dx:  CAD in native artery: CAD in native artery  Cellulitis of right lower extremity: Cellulitis of right lower extremity  Type 2 diabetes mellitus: Type 2 diabetes mellitus      HEALTH ISSUES - R/O PROBLEM Dx:      PLAN:  Recommend elevation of LLE, with application of heating packs as needed.  Would recommend discontinuing anticoagulation when safe to do so from a cardiovascular standpoint.  Would consider compression wrap of left thigh  no acute vascular surgery intervention at this time, will continue to monitor the area.    pt seen and examined at [facility], discussed with the attending What Type Of Note Output Would You Prefer (Optional)?: Bullet Format How Severe Are Your Spot(S)?: mild Have Your Spot(S) Been Treated In The Past?: has not been treated Hpi Title: Evaluation of Skin Lesions Additional History: Spot on right lower arm that is not going away

## 2022-08-03 NOTE — ED ADULT NURSE NOTE - NS ED NURSE REPORT GIVEN TO FT
Patient was sent by NT to be seen today or tomorrow for a tag/wart by her left eye that keeps getting bigger and it has become painful.  Do you want to add patient in on schedule or refer to UC
Please call pt to see if she can come now.
nelly grace BILLS

## 2022-11-17 NOTE — H&P ADULT - PROBLEM SELECTOR PROBLEM 1
As per Father patient has had cough congestion and Fever [id number], Tylenol last given at 7 AM Coronary artery disease

## 2023-03-02 ENCOUNTER — NON-APPOINTMENT (OUTPATIENT)
Age: 68
End: 2023-03-02

## 2023-03-07 ENCOUNTER — OUTPATIENT (OUTPATIENT)
Dept: OUTPATIENT SERVICES | Facility: HOSPITAL | Age: 68
LOS: 1 days | End: 2023-03-07
Payer: COMMERCIAL

## 2023-03-07 ENCOUNTER — TRANSCRIPTION ENCOUNTER (OUTPATIENT)
Age: 68
End: 2023-03-07

## 2023-03-07 VITALS
HEART RATE: 74 BPM | WEIGHT: 147.93 LBS | DIASTOLIC BLOOD PRESSURE: 77 MMHG | RESPIRATION RATE: 18 BRPM | SYSTOLIC BLOOD PRESSURE: 171 MMHG | TEMPERATURE: 98 F | OXYGEN SATURATION: 94 % | HEIGHT: 66 IN

## 2023-03-07 VITALS
DIASTOLIC BLOOD PRESSURE: 70 MMHG | OXYGEN SATURATION: 98 % | SYSTOLIC BLOOD PRESSURE: 149 MMHG | RESPIRATION RATE: 18 BRPM | HEART RATE: 71 BPM

## 2023-03-07 DIAGNOSIS — R94.39 ABNORMAL RESULT OF OTHER CARDIOVASCULAR FUNCTION STUDY: ICD-10-CM

## 2023-03-07 LAB
ALBUMIN SERPL ELPH-MCNC: 4.8 G/DL — SIGNIFICANT CHANGE UP (ref 3.3–5)
ALP SERPL-CCNC: 121 U/L — HIGH (ref 40–120)
ALT FLD-CCNC: 17 U/L — SIGNIFICANT CHANGE UP (ref 10–45)
ANION GAP SERPL CALC-SCNC: 11 MMOL/L — SIGNIFICANT CHANGE UP (ref 5–17)
AST SERPL-CCNC: 25 U/L — SIGNIFICANT CHANGE UP (ref 10–40)
BILIRUB SERPL-MCNC: 2.2 MG/DL — HIGH (ref 0.2–1.2)
BUN SERPL-MCNC: 29 MG/DL — HIGH (ref 7–23)
CALCIUM SERPL-MCNC: 10.2 MG/DL — SIGNIFICANT CHANGE UP (ref 8.4–10.5)
CHLORIDE SERPL-SCNC: 103 MMOL/L — SIGNIFICANT CHANGE UP (ref 96–108)
CO2 SERPL-SCNC: 27 MMOL/L — SIGNIFICANT CHANGE UP (ref 22–31)
CREAT SERPL-MCNC: 1.38 MG/DL — HIGH (ref 0.5–1.3)
EGFR: 56 ML/MIN/1.73M2 — LOW
GLUCOSE BLDC GLUCOMTR-MCNC: 125 MG/DL — HIGH (ref 70–99)
GLUCOSE BLDC GLUCOMTR-MCNC: 59 MG/DL — LOW (ref 70–99)
GLUCOSE BLDC GLUCOMTR-MCNC: 60 MG/DL — LOW (ref 70–99)
GLUCOSE BLDC GLUCOMTR-MCNC: 77 MG/DL — SIGNIFICANT CHANGE UP (ref 70–99)
GLUCOSE BLDC GLUCOMTR-MCNC: 89 MG/DL — SIGNIFICANT CHANGE UP (ref 70–99)
GLUCOSE SERPL-MCNC: 105 MG/DL — HIGH (ref 70–99)
HCT VFR BLD CALC: 44.1 % — SIGNIFICANT CHANGE UP (ref 39–50)
HGB BLD-MCNC: 15.2 G/DL — SIGNIFICANT CHANGE UP (ref 13–17)
MCHC RBC-ENTMCNC: 30.9 PG — SIGNIFICANT CHANGE UP (ref 27–34)
MCHC RBC-ENTMCNC: 34.5 GM/DL — SIGNIFICANT CHANGE UP (ref 32–36)
MCV RBC AUTO: 89.6 FL — SIGNIFICANT CHANGE UP (ref 80–100)
NRBC # BLD: 0 /100 WBCS — SIGNIFICANT CHANGE UP (ref 0–0)
PLATELET # BLD AUTO: 238 K/UL — SIGNIFICANT CHANGE UP (ref 150–400)
POTASSIUM SERPL-MCNC: 4.3 MMOL/L — SIGNIFICANT CHANGE UP (ref 3.5–5.3)
POTASSIUM SERPL-SCNC: 4.3 MMOL/L — SIGNIFICANT CHANGE UP (ref 3.5–5.3)
PROT SERPL-MCNC: 7.3 G/DL — SIGNIFICANT CHANGE UP (ref 6–8.3)
RBC # BLD: 4.92 M/UL — SIGNIFICANT CHANGE UP (ref 4.2–5.8)
RBC # FLD: 12.7 % — SIGNIFICANT CHANGE UP (ref 10.3–14.5)
SODIUM SERPL-SCNC: 141 MMOL/L — SIGNIFICANT CHANGE UP (ref 135–145)
WBC # BLD: 12.38 K/UL — HIGH (ref 3.8–10.5)
WBC # FLD AUTO: 12.38 K/UL — HIGH (ref 3.8–10.5)

## 2023-03-07 PROCEDURE — 93010 ELECTROCARDIOGRAM REPORT: CPT | Mod: 76

## 2023-03-07 PROCEDURE — C9600: CPT | Mod: LC

## 2023-03-07 PROCEDURE — 93454 CORONARY ARTERY ANGIO S&I: CPT | Mod: 59

## 2023-03-07 PROCEDURE — 36415 COLL VENOUS BLD VENIPUNCTURE: CPT

## 2023-03-07 PROCEDURE — C1769: CPT

## 2023-03-07 PROCEDURE — 82962 GLUCOSE BLOOD TEST: CPT

## 2023-03-07 PROCEDURE — 99152 MOD SED SAME PHYS/QHP 5/>YRS: CPT

## 2023-03-07 PROCEDURE — C1725: CPT

## 2023-03-07 PROCEDURE — 93005 ELECTROCARDIOGRAM TRACING: CPT

## 2023-03-07 PROCEDURE — 93454 CORONARY ARTERY ANGIO S&I: CPT | Mod: 26,59

## 2023-03-07 PROCEDURE — C1887: CPT

## 2023-03-07 PROCEDURE — 80053 COMPREHEN METABOLIC PANEL: CPT

## 2023-03-07 PROCEDURE — 85027 COMPLETE CBC AUTOMATED: CPT

## 2023-03-07 PROCEDURE — 92928 PRQ TCAT PLMT NTRAC ST 1 LES: CPT | Mod: LC

## 2023-03-07 PROCEDURE — C1874: CPT

## 2023-03-07 PROCEDURE — C1894: CPT

## 2023-03-07 RX ORDER — INSULIN GLARGINE 100 [IU]/ML
30 INJECTION, SOLUTION SUBCUTANEOUS
Qty: 0 | Refills: 0 | DISCHARGE

## 2023-03-07 RX ORDER — HYDRALAZINE HCL 50 MG
5 TABLET ORAL ONCE
Refills: 0 | Status: COMPLETED | OUTPATIENT
Start: 2023-03-07 | End: 2023-03-07

## 2023-03-07 RX ORDER — GLUCAGON INJECTION, SOLUTION 0.5 MG/.1ML
1 INJECTION, SOLUTION SUBCUTANEOUS ONCE
Refills: 0 | Status: DISCONTINUED | OUTPATIENT
Start: 2023-03-07 | End: 2023-03-22

## 2023-03-07 RX ORDER — ATORVASTATIN CALCIUM 80 MG/1
1 TABLET, FILM COATED ORAL
Qty: 30 | Refills: 0
Start: 2023-03-07 | End: 2023-04-05

## 2023-03-07 RX ORDER — INSULIN LISPRO 100/ML
VIAL (ML) SUBCUTANEOUS
Refills: 0 | Status: DISCONTINUED | OUTPATIENT
Start: 2023-03-07 | End: 2023-03-22

## 2023-03-07 RX ORDER — INSULIN GLARGINE 100 [IU]/ML
25 INJECTION, SOLUTION SUBCUTANEOUS AT BEDTIME
Refills: 0 | Status: DISCONTINUED | OUTPATIENT
Start: 2023-03-07 | End: 2023-03-22

## 2023-03-07 RX ORDER — PREGABALIN 225 MG/1
1 CAPSULE ORAL
Qty: 0 | Refills: 0 | DISCHARGE

## 2023-03-07 RX ORDER — DEXTROSE 50 % IN WATER 50 %
15 SYRINGE (ML) INTRAVENOUS ONCE
Refills: 0 | Status: DISCONTINUED | OUTPATIENT
Start: 2023-03-07 | End: 2023-03-22

## 2023-03-07 RX ORDER — DILTIAZEM HCL 120 MG
180 CAPSULE, EXT RELEASE 24 HR ORAL DAILY
Refills: 0 | Status: DISCONTINUED | OUTPATIENT
Start: 2023-03-07 | End: 2023-03-22

## 2023-03-07 RX ORDER — DEXTROSE 50 % IN WATER 50 %
25 SYRINGE (ML) INTRAVENOUS ONCE
Refills: 0 | Status: DISCONTINUED | OUTPATIENT
Start: 2023-03-07 | End: 2023-03-22

## 2023-03-07 RX ORDER — LABETALOL HCL 100 MG
10 TABLET ORAL ONCE
Refills: 0 | Status: COMPLETED | OUTPATIENT
Start: 2023-03-07 | End: 2023-03-07

## 2023-03-07 RX ORDER — ZINC SULFATE TAB 220 MG (50 MG ZINC EQUIVALENT) 220 (50 ZN) MG
1 TAB ORAL
Qty: 0 | Refills: 0 | DISCHARGE

## 2023-03-07 RX ORDER — SODIUM CHLORIDE 9 MG/ML
1000 INJECTION, SOLUTION INTRAVENOUS
Refills: 0 | Status: DISCONTINUED | OUTPATIENT
Start: 2023-03-07 | End: 2023-03-22

## 2023-03-07 RX ORDER — DEXTROSE 50 % IN WATER 50 %
15 SYRINGE (ML) INTRAVENOUS ONCE
Refills: 0 | Status: COMPLETED | OUTPATIENT
Start: 2023-03-07 | End: 2023-03-07

## 2023-03-07 RX ORDER — CLOPIDOGREL BISULFATE 75 MG/1
1 TABLET, FILM COATED ORAL
Qty: 90 | Refills: 1
Start: 2023-03-07 | End: 2023-09-02

## 2023-03-07 RX ORDER — CLOPIDOGREL BISULFATE 75 MG/1
75 TABLET, FILM COATED ORAL DAILY
Refills: 0 | Status: DISCONTINUED | OUTPATIENT
Start: 2023-03-07 | End: 2023-03-22

## 2023-03-07 RX ORDER — ASPIRIN/CALCIUM CARB/MAGNESIUM 324 MG
1 TABLET ORAL
Qty: 0 | Refills: 0 | DISCHARGE

## 2023-03-07 RX ORDER — ASPIRIN/CALCIUM CARB/MAGNESIUM 324 MG
81 TABLET ORAL DAILY
Refills: 0 | Status: DISCONTINUED | OUTPATIENT
Start: 2023-03-07 | End: 2023-03-22

## 2023-03-07 RX ORDER — DEXTROSE 50 % IN WATER 50 %
12.5 SYRINGE (ML) INTRAVENOUS ONCE
Refills: 0 | Status: DISCONTINUED | OUTPATIENT
Start: 2023-03-07 | End: 2023-03-22

## 2023-03-07 RX ORDER — INSULIN LISPRO 100/ML
VIAL (ML) SUBCUTANEOUS AT BEDTIME
Refills: 0 | Status: DISCONTINUED | OUTPATIENT
Start: 2023-03-07 | End: 2023-03-22

## 2023-03-07 RX ORDER — ASCORBIC ACID 60 MG
1 TABLET,CHEWABLE ORAL
Qty: 0 | Refills: 0 | DISCHARGE

## 2023-03-07 RX ORDER — CHOLECALCIFEROL (VITAMIN D3) 125 MCG
1 CAPSULE ORAL
Qty: 0 | Refills: 0 | DISCHARGE

## 2023-03-07 RX ORDER — METOPROLOL TARTRATE 50 MG
50 TABLET ORAL DAILY
Refills: 0 | Status: DISCONTINUED | OUTPATIENT
Start: 2023-03-07 | End: 2023-03-22

## 2023-03-07 RX ADMIN — Medication 15 GRAM(S): at 18:00

## 2023-03-07 RX ADMIN — Medication 5 MILLIGRAM(S): at 16:48

## 2023-03-07 RX ADMIN — Medication 10 MILLIGRAM(S): at 18:25

## 2023-03-07 NOTE — ASU DISCHARGE PLAN (ADULT/PEDIATRIC) - NS MD DC FALL RISK RISK
For information on Fall & Injury Prevention, visit: [email or web address] OR  [email or web address] OR  https://www.cdc.gov/steadi/patient.html

## 2023-03-07 NOTE — H&P CARDIOLOGY - HISTORY OF PRESENT ILLNESS
This is a 67 yo M with PMH of CAD s/p PCI D1 on 12/15, pLAD on 5/16, and most recent mLAD stent on 2/28/22, pAfib on Xarelto ( last dose 3/4/2023), HTN, HLD, HFpEF, DM type 2 ( last A1C approx 8.0%, well managed wo complications as per pt).  Seen and evaluated by Dr Moon for c/c of dyspnea on exertion, denies related chest pain, dizziness, palpitations, N&V, HA, orthopnea, LE edema.  Had NST on 2/23/2023 at outpt cards, which revealed: EF 47%, mild to moderate inferolateral wall defect that is reversible, consistent with exercise induced myocardial infarct. Presents here today for [facility] for further evaluation of CAD.          < from: Cardiac Catheterization (02.28.22 @ 16:51) >    Conclusions:   Successful stent of mid LAD.  All prior stents were patent.  DAPT for  6 mths    Diagnostic Findings:     Coronary Angiography   The coronary circulation is co-dominant.      LM   Proximal left main: There is a 30 % stenosis.      LAD   Mid left anterior descending: There is a 70 % stenosis. Instant  wave-free ratio was performed with a calculated value of 0.70.  Based on the results, the lesion was judged to be significant and an  intervention was performed. First diagonal: Angiography  shows mild atherosclerosis.      CX    Proximal circumflex: There is a 40 % stenosis.      RCA   Right coronary artery: The segment is small.Angiography shows  moderate atherosclerosis.      < end of copied text >    < from: Transthoracic Echocardiogram (03.01.22 @ 10:37) >  EF (Visual Estimate): 45 %  Doppler Peak Velocity (m/sec): AoV=1.0  ------------------------------------------------------------------------  Observations:  Mitral Valve: Mild mitral annular calcification. Tethered  mitral valve leaflets with normal opening. Mild-moderate  mitral regurgitation.  Aortic Valve/Aorta: Calcified trileaflet aortic valve with  normal opening. Peak transaortic valve gradient equals 4 mm  Hg, mean transaortic valve gradient equals 2 mm Hg, aortic  valve velocity time integral equals 22 cm. Minimal aortic  regurgitation. Peak left ventricular outflow tract gradient  equals 3 mm Hg, LVOT velocity time integral equals 13 cm.  Aortic Root: 3 cm.  Left Atrium: Normal left atrium.  LA volume index = 29  cc/m2.  Left Ventricle: Mild segmental left ventricular systolic  dysfunction. The basal inferior, basal inferoseptum, and  mid inferolateral segments are hypokinetic. Normal left  ventricular internal dimensions and wall thicknesses.  Severe diastolic dysfunction.  Right Heart: Right atrium not well visualized, probably  normal. The right ventricle is not well visualized; grossly  normal right ventricular systolic function. Normal  tricuspid valve. Minimal tricuspid regurgitation. Normal  pulmonic valve. Minimal pulmonic regurgitation.  Pericardium/Pleura: Normal pericardium with no pericardial  effusion.  Hemodynamic: Estimated right atrial pressure is 8 mm Hg.  Estimated right ventricular systolic pressure equals 36 mm  Hg, assuming right atrial pressure equals 8 mm Hg,  consistent with borderline pulmonary hypertension.  ------------------------------------------------------------------------  Conclusions:  1. Mild mitral annular calcification. Tethered mitral valve  leaflets with normal opening. Mild-moderate mitral  regurgitation.  2. Calcified trileaflet aortic valvewith normal opening.  Minimal aortic regurgitation.  3. Mild segmental left ventricular systolic dysfunction.  The basal inferior, basal inferoseptum, and mid  inferolateral segments are hypokinetic.  4. The right ventricle is not well visualized; grossly  normal right ventricular systolic function.  *** Compared with echocardiogram of 2/28/2020, LV systolic  function is mildly decreased.    < end of copied text >   This is a 67 yo M with PMH of CAD s/p PCI D1 on 12/15, pLAD on 5/16, and most recent mLAD stent on 2/28/22, pAfib on Xarelto ( last dose 3/4/2023), HTN, HLD, HFpEF, DM type 2 ( last A1C approx 8.0%, well managed wo complications as per pt).  Seen and evaluated by Dr Moon for c/c of dyspnea on exertion after walking 1 block x 6 months;  denies related chest pain, dizziness, palpitations, N&V, HA, orthopnea, LE edema.  Had NST on 2/23/2023 at outpt cards, which revealed: EF 47%, mild to moderate inferolateral wall defect that is reversible, consistent with exercise induced myocardial infarct. Presents here today for [facility] for further evaluation of CAD.          < from: Cardiac Catheterization (02.28.22 @ 16:51) >    Conclusions:   Successful stent of mid LAD.  All prior stents were patent.  DAPT for  6 mths    Diagnostic Findings:     Coronary Angiography   The coronary circulation is co-dominant.      LM   Proximal left main: There is a 30 % stenosis.      LAD   Mid left anterior descending: There is a 70 % stenosis. Instant  wave-free ratio was performed with a calculated value of 0.70.  Based on the results, the lesion was judged to be significant and an  intervention was performed. First diagonal: Angiography  shows mild atherosclerosis.      CX    Proximal circumflex: There is a 40 % stenosis.      RCA   Right coronary artery: The segment is small.Angiography shows  moderate atherosclerosis.      < end of copied text >    < from: Transthoracic Echocardiogram (03.01.22 @ 10:37) >  EF (Visual Estimate): 45 %  Doppler Peak Velocity (m/sec): AoV=1.0  ------------------------------------------------------------------------  Observations:  Mitral Valve: Mild mitral annular calcification. Tethered  mitral valve leaflets with normal opening. Mild-moderate  mitral regurgitation.  Aortic Valve/Aorta: Calcified trileaflet aortic valve with  normal opening. Peak transaortic valve gradient equals 4 mm  Hg, mean transaortic valve gradient equals 2 mm Hg, aortic  valve velocity time integral equals 22 cm. Minimal aortic  regurgitation. Peak left ventricular outflow tract gradient  equals 3 mm Hg, LVOT velocity time integral equals 13 cm.  Aortic Root: 3 cm.  Left Atrium: Normal left atrium.  LA volume index = 29  cc/m2.  Left Ventricle: Mild segmental left ventricular systolic  dysfunction. The basal inferior, basal inferoseptum, and  mid inferolateral segments are hypokinetic. Normal left  ventricular internal dimensions and wall thicknesses.  Severe diastolic dysfunction.  Right Heart: Right atrium not well visualized, probably  normal. The right ventricle is not well visualized; grossly  normal right ventricular systolic function. Normal  tricuspid valve. Minimal tricuspid regurgitation. Normal  pulmonic valve. Minimal pulmonic regurgitation.  Pericardium/Pleura: Normal pericardium with no pericardial  effusion.  Hemodynamic: Estimated right atrial pressure is 8 mm Hg.  Estimated right ventricular systolic pressure equals 36 mm  Hg, assuming right atrial pressure equals 8 mm Hg,  consistent with borderline pulmonary hypertension.  ------------------------------------------------------------------------  Conclusions:  1. Mild mitral annular calcification. Tethered mitral valve  leaflets with normal opening. Mild-moderate mitral  regurgitation.  2. Calcified trileaflet aortic valvewith normal opening.  Minimal aortic regurgitation.  3. Mild segmental left ventricular systolic dysfunction.  The basal inferior, basal inferoseptum, and mid  inferolateral segments are hypokinetic.  4. The right ventricle is not well visualized; grossly  normal right ventricular systolic function.  *** Compared with echocardiogram of 2/28/2020, LV systolic  function is mildly decreased.    < end of copied text >

## 2023-03-07 NOTE — CHART NOTE - NSCHARTNOTEFT_GEN_A_CORE
Removal of Femoral Sheath    Pulses in the right lower extremity are palpable. The patient was placed in the supine position. The insertion site was identified and the sutures were removed per protocol.  The [employer] [place] femoral sheath was then removed by LEANN Noe. Direct pressure was applied for  20 minutes.     Monitoring of the right groin and both lower extremities including neuro-vascular checks and vital signs every 15 minutes x 4, then every 30 minutes x 2, then every 1 hour was ordered.    Complications: None    Comments: Patient instructed on post sheath removal precautions, importance of DAPT, verbalized understanding Telehealth follow up  visit after RFA.  I will call you on Nov 22 between 5 and 8 pm

## 2023-03-07 NOTE — ASU DISCHARGE PLAN (ADULT/PEDIATRIC) - CARE PROVIDER_API CALL
Alfred Moon (MD)  Cardiology; Internal Medicine  [address] [place] [address]  Phone: [phone]  Fax: [phone]  Established Patient  Follow Up Time: 1 week

## 2023-04-21 NOTE — PATIENT PROFILE ADULT - FUNCTIONAL ASSESSMENT - BASIC MOBILITY 6.
Bedside report given to YONNY Sherman RN, assuming care of pt at this time. 4 = No assist / stand by assistance

## 2023-05-03 ENCOUNTER — INPATIENT (INPATIENT)
Facility: HOSPITAL | Age: 68
LOS: 1 days | Discharge: HOME | DRG: 291 | End: 2023-05-05
Attending: GENERAL ACUTE CARE HOSPITAL | Admitting: GENERAL ACUTE CARE HOSPITAL
Payer: COMMERCIAL

## 2023-05-03 VITALS
WEIGHT: 149.91 LBS | SYSTOLIC BLOOD PRESSURE: 162 MMHG | TEMPERATURE: 100 F | HEART RATE: 80 BPM | OXYGEN SATURATION: 99 % | DIASTOLIC BLOOD PRESSURE: 82 MMHG | RESPIRATION RATE: 20 BRPM | HEIGHT: 66 IN

## 2023-05-03 LAB
ALBUMIN SERPL ELPH-MCNC: 4.3 G/DL — SIGNIFICANT CHANGE UP (ref 3.3–5)
ALP SERPL-CCNC: 145 U/L — HIGH (ref 40–120)
ALT FLD-CCNC: 20 U/L — SIGNIFICANT CHANGE UP (ref 10–45)
ANION GAP SERPL CALC-SCNC: 10 MMOL/L — SIGNIFICANT CHANGE UP (ref 5–17)
AST SERPL-CCNC: 24 U/L — SIGNIFICANT CHANGE UP (ref 10–40)
BASE EXCESS BLDV CALC-SCNC: 2.8 MMOL/L — SIGNIFICANT CHANGE UP (ref -2–3)
BASOPHILS # BLD AUTO: 0.07 K/UL — SIGNIFICANT CHANGE UP (ref 0–0.2)
BASOPHILS NFR BLD AUTO: 0.6 % — SIGNIFICANT CHANGE UP (ref 0–2)
BILIRUB SERPL-MCNC: 3 MG/DL — HIGH (ref 0.2–1.2)
BUN SERPL-MCNC: 13 MG/DL — SIGNIFICANT CHANGE UP (ref 7–23)
CA-I SERPL-SCNC: 1.25 MMOL/L — SIGNIFICANT CHANGE UP (ref 1.15–1.33)
CALCIUM SERPL-MCNC: 9.5 MG/DL — SIGNIFICANT CHANGE UP (ref 8.4–10.5)
CHLORIDE BLDV-SCNC: 100 MMOL/L — SIGNIFICANT CHANGE UP (ref 96–108)
CHLORIDE SERPL-SCNC: 104 MMOL/L — SIGNIFICANT CHANGE UP (ref 96–108)
CO2 BLDV-SCNC: 31 MMOL/L — HIGH (ref 22–26)
CO2 SERPL-SCNC: 25 MMOL/L — SIGNIFICANT CHANGE UP (ref 22–31)
CREAT SERPL-MCNC: 1.3 MG/DL — SIGNIFICANT CHANGE UP (ref 0.5–1.3)
EGFR: 61 ML/MIN/1.73M2 — SIGNIFICANT CHANGE UP
EOSINOPHIL # BLD AUTO: 0.3 K/UL — SIGNIFICANT CHANGE UP (ref 0–0.5)
EOSINOPHIL NFR BLD AUTO: 2.4 % — SIGNIFICANT CHANGE UP (ref 0–6)
GAS PNL BLDV: 136 MMOL/L — SIGNIFICANT CHANGE UP (ref 136–145)
GAS PNL BLDV: SIGNIFICANT CHANGE UP
GAS PNL BLDV: SIGNIFICANT CHANGE UP
GLUCOSE BLDV-MCNC: 128 MG/DL — HIGH (ref 70–99)
GLUCOSE SERPL-MCNC: 131 MG/DL — HIGH (ref 70–99)
HCO3 BLDV-SCNC: 29 MMOL/L — SIGNIFICANT CHANGE UP (ref 22–29)
HCT VFR BLD CALC: 41.8 % — SIGNIFICANT CHANGE UP (ref 39–50)
HCT VFR BLDA CALC: 43 % — SIGNIFICANT CHANGE UP (ref 39–51)
HGB BLD CALC-MCNC: 14.4 G/DL — SIGNIFICANT CHANGE UP (ref 12.6–17.4)
HGB BLD-MCNC: 14.1 G/DL — SIGNIFICANT CHANGE UP (ref 13–17)
IMM GRANULOCYTES NFR BLD AUTO: 0.5 % — SIGNIFICANT CHANGE UP (ref 0–0.9)
LACTATE BLDV-MCNC: 0.9 MMOL/L — SIGNIFICANT CHANGE UP (ref 0.5–2)
LYMPHOCYTES # BLD AUTO: 1.44 K/UL — SIGNIFICANT CHANGE UP (ref 1–3.3)
LYMPHOCYTES # BLD AUTO: 11.6 % — LOW (ref 13–44)
MCHC RBC-ENTMCNC: 30.9 PG — SIGNIFICANT CHANGE UP (ref 27–34)
MCHC RBC-ENTMCNC: 33.7 GM/DL — SIGNIFICANT CHANGE UP (ref 32–36)
MCV RBC AUTO: 91.7 FL — SIGNIFICANT CHANGE UP (ref 80–100)
MONOCYTES # BLD AUTO: 0.95 K/UL — HIGH (ref 0–0.9)
MONOCYTES NFR BLD AUTO: 7.7 % — SIGNIFICANT CHANGE UP (ref 2–14)
NEUTROPHILS # BLD AUTO: 9.56 K/UL — HIGH (ref 1.8–7.4)
NEUTROPHILS NFR BLD AUTO: 77.2 % — HIGH (ref 43–77)
NRBC # BLD: 0 /100 WBCS — SIGNIFICANT CHANGE UP (ref 0–0)
NT-PROBNP SERPL-SCNC: 4439 PG/ML — HIGH (ref 0–300)
PCO2 BLDV: 52 MMHG — SIGNIFICANT CHANGE UP (ref 42–55)
PH BLDV: 7.36 — SIGNIFICANT CHANGE UP (ref 7.32–7.43)
PLATELET # BLD AUTO: 245 K/UL — SIGNIFICANT CHANGE UP (ref 150–400)
PO2 BLDV: 14 MMHG — LOW (ref 25–45)
POTASSIUM BLDV-SCNC: 5 MMOL/L — SIGNIFICANT CHANGE UP (ref 3.5–5.1)
POTASSIUM SERPL-MCNC: 5.1 MMOL/L — SIGNIFICANT CHANGE UP (ref 3.5–5.3)
POTASSIUM SERPL-SCNC: 5.1 MMOL/L — SIGNIFICANT CHANGE UP (ref 3.5–5.3)
PROT SERPL-MCNC: 6.8 G/DL — SIGNIFICANT CHANGE UP (ref 6–8.3)
RBC # BLD: 4.56 M/UL — SIGNIFICANT CHANGE UP (ref 4.2–5.8)
RBC # FLD: 13.2 % — SIGNIFICANT CHANGE UP (ref 10.3–14.5)
SAO2 % BLDV: 13.6 % — LOW (ref 67–88)
SODIUM SERPL-SCNC: 139 MMOL/L — SIGNIFICANT CHANGE UP (ref 135–145)
TROPONIN T, HIGH SENSITIVITY RESULT: 24 NG/L — SIGNIFICANT CHANGE UP (ref 0–51)
WBC # BLD: 12.38 K/UL — HIGH (ref 3.8–10.5)
WBC # FLD AUTO: 12.38 K/UL — HIGH (ref 3.8–10.5)

## 2023-05-03 PROCEDURE — 99285 EMERGENCY DEPT VISIT HI MDM: CPT

## 2023-05-03 PROCEDURE — 71045 X-RAY EXAM CHEST 1 VIEW: CPT | Mod: 26

## 2023-05-03 NOTE — ED PROVIDER NOTE - RAPID ASSESSMENT
68y M w/ pmhx of CAD w/ multiple stents on Xarelto, HTN, DMT2, Afib, HLD, CHF sent to the ED by Dr. Kumar to r/o CHF. Pt is c/o SOB, cough x2wk. Denies cp. Pt is well appearing in triage.     Yesenia CRAIG (Trinh) have documented this rapid assessment note under the dictation of Richard Escobar) which has been reviewed and affirmed to be accurate. Patient was seen as a QDOC patient. The patient will be seen and further worked up in the main emergency department and their care will be completed by the main emergency department team along with a thorough physical exam. Receiving team will follow up on labs, analgesia, any clinical imaging, reassess and disposition as clinically indicated, all decisions regarding the progression of care will be made at their discretion. 68y M w/ pmhx of CAD w/ multiple stents on Xarelto, HTN, DMT2, Afib, HLD, CHF sent to the ED by Dr. Kumar to r/o CHF. Pt is c/o SOB, cough x2wk. w giron,fatigue, Seen today and referred to ed ro acs. PE WDWN male awake alert on nasal oxygen  Richard Escobar MD, Facep     Yesenia CRAIG (Scribe) have documented this rapid assessment note under the dictation of Richard Escobar) which has been reviewed and affirmed to be accurate. Patient was seen as a QDOC patient. The patient will be seen and further worked up in the main emergency department and their care will be completed by the main emergency department team along with a thorough physical exam. Receiving team will follow up on labs, analgesia, any clinical imaging, reassess and disposition as clinically indicated, all decisions regarding the progression of care will be made at their discretion.    PT seen as Triag/Qdoc with scribe.  Full evaluation to be performed once patient is transferred to main ED  Richard Escobar MD, Facep

## 2023-05-03 NOTE — ED ADULT NURSE NOTE - NSIMPLEMENTINTERV_GEN_ALL_ED
Implemented All Fall with Harm Risk Interventions:  [place] to call system. Call bell, personal items and telephone within reach. Instruct patient to call for assistance. Room bathroom lighting operational. Non-slip footwear when patient is off stretcher. Physically safe environment: no spills, clutter or unnecessary equipment. Stretcher in lowest position, wheels locked, appropriate side rails in place. Provide visual cue, wrist band, yellow gown, etc. Monitor gait and stability. Monitor for mental status changes and reorient to person, place, and time. Review medications for side effects contributing to fall risk. Reinforce activity limits and safety measures with patient and family. Provide visual clues: red socks.

## 2023-05-03 NOTE — ED ADULT NURSE NOTE - OBJECTIVE STATEMENT
68 y.o male c/o SOB and cough x 2weeks. States saw PCP today and was advised to come to ED. Denies CP, fever, edema, NV. PMH CAD w/ stents (on Xarelto), HTN, Afib, HLD, CHF

## 2023-05-03 NOTE — ED PROVIDER NOTE - PHYSICAL EXAMINATION
Cardiac systolic murmur, regular rate, Lungs CTAB, abdomen soft non-tender, BLE trace edema bilaterally

## 2023-05-03 NOTE — ED PROVIDER NOTE - OBJECTIVE STATEMENT
Patient is a 68-year-old male past medical history of CAD on Xarelto diabetes CHF presenting with dyspnea on exertion worsening over the past few weeks.  Patient had cough for the past few weeks that he had bronchitis.  Also has been coughing up sputum and sometimes with a small dot of blood.  Breathing has been worsening over the past 2 weeks noticed that he was having more shortness of breaths on his walks than usual. Denies orthopnea, chest pain, back pain, fevers, abdominal pain, vomiting, urinary symptoms.

## 2023-05-03 NOTE — ED PROVIDER NOTE - CLINICAL SUMMARY MEDICAL DECISION MAKING FREE TEXT BOX
Sent in by his cardiologist Dr. Hernandez.  On review of labs patient's BNP is 4000.  Troponin was bloody.  Patient without chest pain.  Will admit for new onset heart failure.

## 2023-05-03 NOTE — ED PROVIDER NOTE - ATTENDING WITH...
Surgery Post-Operative Note  4/21/2021   ID: 82 year old  POD#0 s/p ileostomy revision     S: Episode of elevated systolic and low diastolic 200/30. He denies chest pain, N/V, dizziness, syncope, N/V. He feels well overall.     O:  BP (!) 170/30   Pulse 74   Temp 95.6  F (35.3  C) (Oral)   Resp 16   Ht 1.829 m (6')   Wt 99.2 kg (218 lb 11.1 oz)   SpO2 98%   BMI 29.66 kg/m    GEN: Alert NAD  CV: RRR  PUL: breathing comfortably on RA  Abdomen: Soft, appropriately tender. R ileostomy w/ red rubber in place, dark brown liquid stool and gas in bad w/ some blood.  Extremities - no edema, non-tender    Lab Results   Component Value Date    HGB 8.5 04/21/2021    HGB 8.2 04/18/2021       A/P: Alexei Blake is an 82-year-old male with history of CAD s/p recent stent on clopidogrel, CHF with EF 25%, history of stroke with left leg deficit, atrial fibrillation on warfarin, ESRD dependent upon dialysis, and DM type II, who is s/p exploratory laparotomy with right hemicolectomy, ileostomy and mucous fistula 03/30/2021.  Doing well. Course c/b postop ileus likely due to narrow stoma with tight fascia. Now s/p ileostomy revision 04/21/2021. He is progressing well. Pressures likely related to fluid overload. mIVF were discontinued as he is now on a regular diet.       - low residue diet  - Continue ASA, holding plavix till 04/22, holding warfarin.             - will resume coumadin at discharge with plan for close f/u with Cardiology   - Antihypertensives per Nephrology, appreciate recs  - Continue TPN for moderate malnutrition, adjustment to volume per Nephro and Pharm  - PPI   - Keep midline staples, evaluate daily   - HD Tu/Th/Sat  - Heparin ppx       Tutu Ness  PGY-1  [phone]       Resident

## 2023-05-03 NOTE — ED ADULT TRIAGE NOTE - CHIEF COMPLAINT QUOTE
SOB x 1 week. sent by Dr. Kumar to r/o CHF. Denying chest pain.   O2 in triage was 90% room air, placed on 2L and increased to 99%.

## 2023-05-04 DIAGNOSIS — I50.9 HEART FAILURE, UNSPECIFIED: ICD-10-CM

## 2023-05-04 DIAGNOSIS — E78.5 HYPERLIPIDEMIA, UNSPECIFIED: ICD-10-CM

## 2023-05-04 DIAGNOSIS — I25.10 ATHEROSCLEROTIC HEART DISEASE OF NATIVE CORONARY ARTERY WITHOUT ANGINA PECTORIS: ICD-10-CM

## 2023-05-04 DIAGNOSIS — I48.0 PAROXYSMAL ATRIAL FIBRILLATION: ICD-10-CM

## 2023-05-04 DIAGNOSIS — Z95.5 PRESENCE OF CORONARY ANGIOPLASTY IMPLANT AND GRAFT: ICD-10-CM

## 2023-05-04 DIAGNOSIS — I10 ESSENTIAL (PRIMARY) HYPERTENSION: ICD-10-CM

## 2023-05-04 DIAGNOSIS — I50.33 ACUTE ON CHRONIC DIASTOLIC (CONGESTIVE) HEART FAILURE: ICD-10-CM

## 2023-05-04 DIAGNOSIS — E11.9 TYPE 2 DIABETES MELLITUS WITHOUT COMPLICATIONS: ICD-10-CM

## 2023-05-04 LAB
A1C WITH ESTIMATED AVERAGE GLUCOSE RESULT: 8.2 % — HIGH (ref 4–5.6)
APPEARANCE UR: CLEAR — SIGNIFICANT CHANGE UP
BILIRUB UR-MCNC: NEGATIVE — SIGNIFICANT CHANGE UP
COLOR SPEC: COLORLESS — SIGNIFICANT CHANGE UP
DIFF PNL FLD: NEGATIVE — SIGNIFICANT CHANGE UP
ESTIMATED AVERAGE GLUCOSE: 189 MG/DL — HIGH (ref 68–114)
GLUCOSE BLDC GLUCOMTR-MCNC: 175 MG/DL — HIGH (ref 70–99)
GLUCOSE BLDC GLUCOMTR-MCNC: 302 MG/DL — HIGH (ref 70–99)
GLUCOSE BLDC GLUCOMTR-MCNC: 314 MG/DL — HIGH (ref 70–99)
GLUCOSE BLDC GLUCOMTR-MCNC: 324 MG/DL — HIGH (ref 70–99)
GLUCOSE BLDC GLUCOMTR-MCNC: 96 MG/DL — SIGNIFICANT CHANGE UP (ref 70–99)
GLUCOSE UR QL: NEGATIVE — SIGNIFICANT CHANGE UP
KETONES UR-MCNC: NEGATIVE — SIGNIFICANT CHANGE UP
LEUKOCYTE ESTERASE UR-ACNC: NEGATIVE — SIGNIFICANT CHANGE UP
NITRITE UR-MCNC: NEGATIVE — SIGNIFICANT CHANGE UP
PH UR: 7.5 — SIGNIFICANT CHANGE UP (ref 5–8)
PROT UR-MCNC: NEGATIVE — SIGNIFICANT CHANGE UP
RAPID RVP RESULT: SIGNIFICANT CHANGE UP
SARS-COV-2 RNA SPEC QL NAA+PROBE: SIGNIFICANT CHANGE UP
SP GR SPEC: 1.01 — SIGNIFICANT CHANGE UP (ref 1.01–1.02)
UROBILINOGEN FLD QL: NEGATIVE — SIGNIFICANT CHANGE UP

## 2023-05-04 PROCEDURE — 99223 1ST HOSP IP/OBS HIGH 75: CPT

## 2023-05-04 PROCEDURE — 71275 CT ANGIOGRAPHY CHEST: CPT | Mod: 26,MA

## 2023-05-04 RX ORDER — GLUCAGON INJECTION, SOLUTION 0.5 MG/.1ML
1 INJECTION, SOLUTION SUBCUTANEOUS ONCE
Refills: 0 | Status: DISCONTINUED | OUTPATIENT
Start: 2023-05-04 | End: 2023-05-05

## 2023-05-04 RX ORDER — RIVAROXABAN 15 MG-20MG
20 KIT ORAL
Refills: 0 | Status: DISCONTINUED | OUTPATIENT
Start: 2023-05-04 | End: 2023-05-05

## 2023-05-04 RX ORDER — DEXTROSE 50 % IN WATER 50 %
25 SYRINGE (ML) INTRAVENOUS ONCE
Refills: 0 | Status: DISCONTINUED | OUTPATIENT
Start: 2023-05-04 | End: 2023-05-05

## 2023-05-04 RX ORDER — METOPROLOL TARTRATE 50 MG
50 TABLET ORAL DAILY
Refills: 0 | Status: DISCONTINUED | OUTPATIENT
Start: 2023-05-04 | End: 2023-05-05

## 2023-05-04 RX ORDER — DEXTROSE 50 % IN WATER 50 %
15 SYRINGE (ML) INTRAVENOUS ONCE
Refills: 0 | Status: DISCONTINUED | OUTPATIENT
Start: 2023-05-04 | End: 2023-05-05

## 2023-05-04 RX ORDER — INSULIN GLARGINE 100 [IU]/ML
22 INJECTION, SOLUTION SUBCUTANEOUS AT BEDTIME
Refills: 0 | Status: DISCONTINUED | OUTPATIENT
Start: 2023-05-04 | End: 2023-05-05

## 2023-05-04 RX ORDER — INSULIN LISPRO 100/ML
VIAL (ML) SUBCUTANEOUS AT BEDTIME
Refills: 0 | Status: DISCONTINUED | OUTPATIENT
Start: 2023-05-04 | End: 2023-05-05

## 2023-05-04 RX ORDER — FUROSEMIDE 40 MG
20 TABLET ORAL
Refills: 0 | Status: DISCONTINUED | OUTPATIENT
Start: 2023-05-04 | End: 2023-05-05

## 2023-05-04 RX ORDER — DEXTROSE 50 % IN WATER 50 %
12.5 SYRINGE (ML) INTRAVENOUS ONCE
Refills: 0 | Status: DISCONTINUED | OUTPATIENT
Start: 2023-05-04 | End: 2023-05-05

## 2023-05-04 RX ORDER — ASPIRIN/CALCIUM CARB/MAGNESIUM 324 MG
1 TABLET ORAL
Qty: 0 | Refills: 0 | DISCHARGE

## 2023-05-04 RX ORDER — FUROSEMIDE 40 MG
20 TABLET ORAL ONCE
Refills: 0 | Status: COMPLETED | OUTPATIENT
Start: 2023-05-04 | End: 2023-05-04

## 2023-05-04 RX ORDER — SODIUM CHLORIDE 9 MG/ML
1000 INJECTION, SOLUTION INTRAVENOUS
Refills: 0 | Status: DISCONTINUED | OUTPATIENT
Start: 2023-05-04 | End: 2023-05-05

## 2023-05-04 RX ORDER — INSULIN GLARGINE 100 [IU]/ML
32 INJECTION, SOLUTION SUBCUTANEOUS
Qty: 0 | Refills: 0 | DISCHARGE

## 2023-05-04 RX ORDER — INSULIN LISPRO 100/ML
VIAL (ML) SUBCUTANEOUS
Refills: 0 | Status: DISCONTINUED | OUTPATIENT
Start: 2023-05-04 | End: 2023-05-05

## 2023-05-04 RX ORDER — CLOPIDOGREL BISULFATE 75 MG/1
75 TABLET, FILM COATED ORAL DAILY
Refills: 0 | Status: DISCONTINUED | OUTPATIENT
Start: 2023-05-04 | End: 2023-05-05

## 2023-05-04 RX ORDER — DILTIAZEM HCL 120 MG
180 CAPSULE, EXT RELEASE 24 HR ORAL DAILY
Refills: 0 | Status: DISCONTINUED | OUTPATIENT
Start: 2023-05-04 | End: 2023-05-05

## 2023-05-04 RX ORDER — ATORVASTATIN CALCIUM 80 MG/1
80 TABLET, FILM COATED ORAL AT BEDTIME
Refills: 0 | Status: DISCONTINUED | OUTPATIENT
Start: 2023-05-04 | End: 2023-05-05

## 2023-05-04 RX ADMIN — Medication 50 MILLIGRAM(S): at 16:13

## 2023-05-04 RX ADMIN — Medication 4: at 12:03

## 2023-05-04 RX ADMIN — Medication 1: at 18:13

## 2023-05-04 RX ADMIN — RIVAROXABAN 20 MILLIGRAM(S): KIT at 18:13

## 2023-05-04 RX ADMIN — Medication 20 MILLIGRAM(S): at 16:13

## 2023-05-04 RX ADMIN — Medication 20 MILLIGRAM(S): at 00:56

## 2023-05-04 RX ADMIN — ATORVASTATIN CALCIUM 80 MILLIGRAM(S): 80 TABLET, FILM COATED ORAL at 22:55

## 2023-05-04 RX ADMIN — Medication 2: at 22:56

## 2023-05-04 RX ADMIN — Medication 180 MILLIGRAM(S): at 18:13

## 2023-05-04 RX ADMIN — CLOPIDOGREL BISULFATE 75 MILLIGRAM(S): 75 TABLET, FILM COATED ORAL at 16:13

## 2023-05-04 RX ADMIN — INSULIN GLARGINE 22 UNIT(S): 100 INJECTION, SOLUTION SUBCUTANEOUS at 23:33

## 2023-05-04 NOTE — ED PROCEDURE NOTE - NS ED PROC PERFORMED BY1 FT
----- Message from Leilani Zelaya sent at 2018 10:56 AM CST -----  Contact: pt  Yasmine Wilson  MRN: [id number]  : 1967  PCP: Joe Aldridge  Home Phone      [phone]  Work Phone      Not on file.  Mobile          [phone]      MESSAGE:  Pt requesting refills on all her medications to be sent to [facility] because she changed insurances and no longer can use [facility]. Please advise.  PHARMACY:  [facility]  PHONE: [phone]   Elizabeth Paulson

## 2023-05-04 NOTE — H&P ADULT - ASSESSMENT
69 y/o M withy a h/o HTN, DM, pAfib, CAD s/p 5 stents (last 3/2023) presenting with acute on chronic HF.

## 2023-05-04 NOTE — H&P ADULT - PROBLEM SELECTOR PLAN 1
Previous notes with signs of HF, TTE with normal systolic function.  -Lasix 20 IV BID  -intake output  -f/u repeat TTE  -currently on RA  -continue metop

## 2023-05-04 NOTE — PATIENT PROFILE ADULT - FUNCTIONAL ASSESSMENT - BASIC MOBILITY 6.
4-calculated by average/Not able to assess (calculate score using [facility] averaging method)

## 2023-05-04 NOTE — PATIENT PROFILE ADULT - FALL HARM RISK - UNIVERSAL INTERVENTIONS
Bed in lowest position, wheels locked, appropriate side rails in place/Call bell, personal items and telephone in reach/Instruct patient to call for assistance before getting out of bed or chair/Non-slip footwear when patient is out of bed/[place] to call system/Physically safe environment - no spills, clutter or unnecessary equipment/Purposeful Proactive Rounding/Room/bathroom lighting operational, light cord in reach

## 2023-05-04 NOTE — H&P ADULT - PROBLEM SELECTOR PLAN 2
s/p multiple stents, last 3/2023  -trops neg, no cp  -continue xarelto plavix  -continue metop, statin

## 2023-05-04 NOTE — H&P ADULT - HISTORY OF PRESENT ILLNESS
69 y/o M presenting with SOB for 2 months    69 y/o M with a history of  CAD s/p 5 stents (last 3/2023), HTN, HLD HFpEF, DM2, pAfib presenting with CLEARY. Patient last presented 3/7 for progressive SOB, underwent LHC, s/p stent *1. Since then, has had SOB with ambulating 2 blocks, relieved by rest. Denies cp/PND/orthopnea. Denies LE swelling. Denies palpitations. Has had ongoing non productive cough over this time period. Denies f/chills. Presented to outpatient pcp for this who recommended presenting to the ED.     In ED, afeb hds, satting well on RA. trop neg. ProBNP 4K, received lasix 20 IV *1 and admitted to medicine.

## 2023-05-05 ENCOUNTER — TRANSCRIPTION ENCOUNTER (OUTPATIENT)
Age: 68
End: 2023-05-05

## 2023-05-05 VITALS
TEMPERATURE: 98 F | HEART RATE: 62 BPM | RESPIRATION RATE: 18 BRPM | OXYGEN SATURATION: 95 % | SYSTOLIC BLOOD PRESSURE: 145 MMHG | DIASTOLIC BLOOD PRESSURE: 74 MMHG

## 2023-05-05 LAB
A1C WITH ESTIMATED AVERAGE GLUCOSE RESULT: 8.4 % — HIGH (ref 4–5.6)
ANION GAP SERPL CALC-SCNC: 12 MMOL/L — SIGNIFICANT CHANGE UP (ref 5–17)
BUN SERPL-MCNC: 24 MG/DL — HIGH (ref 7–23)
CALCIUM SERPL-MCNC: 9.4 MG/DL — SIGNIFICANT CHANGE UP (ref 8.4–10.5)
CHLORIDE SERPL-SCNC: 101 MMOL/L — SIGNIFICANT CHANGE UP (ref 96–108)
CO2 SERPL-SCNC: 24 MMOL/L — SIGNIFICANT CHANGE UP (ref 22–31)
CREAT SERPL-MCNC: 1.37 MG/DL — HIGH (ref 0.5–1.3)
EGFR: 57 ML/MIN/1.73M2 — LOW
ESTIMATED AVERAGE GLUCOSE: 194 MG/DL — HIGH (ref 68–114)
GLUCOSE BLDC GLUCOMTR-MCNC: 191 MG/DL — HIGH (ref 70–99)
GLUCOSE BLDC GLUCOMTR-MCNC: 245 MG/DL — HIGH (ref 70–99)
GLUCOSE BLDC GLUCOMTR-MCNC: 345 MG/DL — HIGH (ref 70–99)
GLUCOSE SERPL-MCNC: 198 MG/DL — HIGH (ref 70–99)
HCT VFR BLD CALC: 40.2 % — SIGNIFICANT CHANGE UP (ref 39–50)
HGB BLD-MCNC: 13.9 G/DL — SIGNIFICANT CHANGE UP (ref 13–17)
MAGNESIUM SERPL-MCNC: 2.2 MG/DL — SIGNIFICANT CHANGE UP (ref 1.6–2.6)
MCHC RBC-ENTMCNC: 31.2 PG — SIGNIFICANT CHANGE UP (ref 27–34)
MCHC RBC-ENTMCNC: 34.6 GM/DL — SIGNIFICANT CHANGE UP (ref 32–36)
MCV RBC AUTO: 90.1 FL — SIGNIFICANT CHANGE UP (ref 80–100)
NRBC # BLD: 0 /100 WBCS — SIGNIFICANT CHANGE UP (ref 0–0)
PLATELET # BLD AUTO: 233 K/UL — SIGNIFICANT CHANGE UP (ref 150–400)
POTASSIUM SERPL-MCNC: 4.1 MMOL/L — SIGNIFICANT CHANGE UP (ref 3.5–5.3)
POTASSIUM SERPL-SCNC: 4.1 MMOL/L — SIGNIFICANT CHANGE UP (ref 3.5–5.3)
RBC # BLD: 4.46 M/UL — SIGNIFICANT CHANGE UP (ref 4.2–5.8)
RBC # FLD: 13.1 % — SIGNIFICANT CHANGE UP (ref 10.3–14.5)
SODIUM SERPL-SCNC: 137 MMOL/L — SIGNIFICANT CHANGE UP (ref 135–145)
WBC # BLD: 10.37 K/UL — SIGNIFICANT CHANGE UP (ref 3.8–10.5)
WBC # FLD AUTO: 10.37 K/UL — SIGNIFICANT CHANGE UP (ref 3.8–10.5)

## 2023-05-05 PROCEDURE — 84484 ASSAY OF TROPONIN QUANT: CPT

## 2023-05-05 PROCEDURE — 93306 TTE W/DOPPLER COMPLETE: CPT | Mod: 26

## 2023-05-05 PROCEDURE — 99285 EMERGENCY DEPT VISIT HI MDM: CPT

## 2023-05-05 PROCEDURE — 83735 ASSAY OF MAGNESIUM: CPT

## 2023-05-05 PROCEDURE — 85025 COMPLETE CBC W/AUTO DIFF WBC: CPT

## 2023-05-05 PROCEDURE — 71045 X-RAY EXAM CHEST 1 VIEW: CPT

## 2023-05-05 PROCEDURE — 82435 ASSAY OF BLOOD CHLORIDE: CPT

## 2023-05-05 PROCEDURE — 71275 CT ANGIOGRAPHY CHEST: CPT | Mod: MA

## 2023-05-05 PROCEDURE — 82803 BLOOD GASES ANY COMBINATION: CPT

## 2023-05-05 PROCEDURE — 82330 ASSAY OF CALCIUM: CPT

## 2023-05-05 PROCEDURE — 80053 COMPREHEN METABOLIC PANEL: CPT

## 2023-05-05 PROCEDURE — 82962 GLUCOSE BLOOD TEST: CPT

## 2023-05-05 PROCEDURE — 36415 COLL VENOUS BLD VENIPUNCTURE: CPT

## 2023-05-05 PROCEDURE — 82947 ASSAY GLUCOSE BLOOD QUANT: CPT

## 2023-05-05 PROCEDURE — 85027 COMPLETE CBC AUTOMATED: CPT

## 2023-05-05 PROCEDURE — 81003 URINALYSIS AUTO W/O SCOPE: CPT

## 2023-05-05 PROCEDURE — 85018 HEMOGLOBIN: CPT

## 2023-05-05 PROCEDURE — 84295 ASSAY OF SERUM SODIUM: CPT

## 2023-05-05 PROCEDURE — 96374 THER/PROPH/DIAG INJ IV PUSH: CPT

## 2023-05-05 PROCEDURE — 93306 TTE W/DOPPLER COMPLETE: CPT

## 2023-05-05 PROCEDURE — 85014 HEMATOCRIT: CPT

## 2023-05-05 PROCEDURE — 84132 ASSAY OF SERUM POTASSIUM: CPT

## 2023-05-05 PROCEDURE — 0225U NFCT DS DNA&RNA 21 SARSCOV2: CPT

## 2023-05-05 PROCEDURE — 83036 HEMOGLOBIN GLYCOSYLATED A1C: CPT

## 2023-05-05 PROCEDURE — 80048 BASIC METABOLIC PNL TOTAL CA: CPT

## 2023-05-05 PROCEDURE — 83605 ASSAY OF LACTIC ACID: CPT

## 2023-05-05 PROCEDURE — 83880 ASSAY OF NATRIURETIC PEPTIDE: CPT

## 2023-05-05 RX ORDER — INSULIN LISPRO 100/ML
6 VIAL (ML) SUBCUTANEOUS
Refills: 0 | Status: DISCONTINUED | OUTPATIENT
Start: 2023-05-05 | End: 2023-05-05

## 2023-05-05 RX ORDER — FUROSEMIDE 40 MG
1 TABLET ORAL
Qty: 30 | Refills: 0
Start: 2023-05-05 | End: 2023-06-03

## 2023-05-05 RX ADMIN — Medication 20 MILLIGRAM(S): at 05:40

## 2023-05-05 RX ADMIN — Medication 50 MILLIGRAM(S): at 05:40

## 2023-05-05 RX ADMIN — Medication 4: at 13:23

## 2023-05-05 RX ADMIN — RIVAROXABAN 20 MILLIGRAM(S): KIT at 18:44

## 2023-05-05 RX ADMIN — Medication 180 MILLIGRAM(S): at 05:40

## 2023-05-05 RX ADMIN — Medication 2: at 09:08

## 2023-05-05 RX ADMIN — Medication 6 UNIT(S): at 18:45

## 2023-05-05 RX ADMIN — CLOPIDOGREL BISULFATE 75 MILLIGRAM(S): 75 TABLET, FILM COATED ORAL at 11:22

## 2023-05-05 RX ADMIN — Medication 20 MILLIGRAM(S): at 14:00

## 2023-05-05 RX ADMIN — Medication 1: at 18:43

## 2023-05-05 RX ADMIN — Medication 6 UNIT(S): at 13:24

## 2023-05-05 NOTE — CONSULT NOTE ADULT - ASSESSMENT
Assessment  DMT2: 68y Male with DM T2 with hyperglycemia, A1C 8.2%, was on insulin at home, now  started on basal bolus insulin with coverage, blood sugars running high. Eating meals.     CAD/CHF: on medications, stable, monitored. Hx of multiple stents  HTN: on antihypertensive medications, monitored, asymptomatic.  HLD: On statin, diet controlled.   CKD:  Monitor labs/BMP    Discussed plan and management wit Dr Flo Rossi MD  Cell: [phone]  Office: [phone]               Assessment  DMT2: 68y Male with DM T2 with hyperglycemia, A1C 8.2%, was on insulin at home, now  started on basal bolus insulin with coverage, blood sugars running high.  Eating meals.     CAD/CHF: on medications, stable, monitored. Hx of multiple stents  HTN: on antihypertensive medications, monitored, asymptomatic.  HLD: On statin, diet controlled.   CKD:  Monitor labs/BMP    Discussed plan and management wit Dr Flo Rossi MD  Cell: [phone]  Office: [phone]

## 2023-05-05 NOTE — DISCHARGE NOTE PROVIDER - NSDCCPCAREPLAN_GEN_ALL_CORE_FT
PRINCIPAL DISCHARGE DIAGNOSIS  Diagnosis: Acute on chronic diastolic HF (heart failure)  Assessment and Plan of Treatment: Weigh yourself daily.  If you gain 3lbs in 3 days, or 5lbs in a week call your Health Care Provider.  Do not eat or drink foods containing more than 2000mg of salt (sodium) in your diet every day.  Call your Health Care Provider if you have any swelling or increased swelling in your feet, ankles, and/or stomach.  Take all of your medication as directed.  If you become dizzy call your Health Care Provider.        SECONDARY DISCHARGE DIAGNOSES  Diagnosis: Paroxysmal atrial fibrillation  Assessment and Plan of Treatment: Atrial fibrillation is the most common heart rhythm problem.  The condition puts you at risk for has stroke and heart attack  It helps if you control your blood pressure, not drink more than 1-2 alcohol drinks per day, cut down on caffeine, getting treatment for over active thyroid gland, and get regular exercise  Call your doctor if you feel your heart racing or beating unusually, chest tightness or pain, lightheaded, faint, shortness of breath especially with exercise  It is important to take your heart medication as prescribed  You may be on anticoagulation which is very important to take as directed - you may need blood work to monitor drug levels      Diagnosis: Stented coronary artery  Assessment and Plan of Treatment: continue cardiac medications as prescribed  Follow up with cardiology    Diagnosis: Type 2 diabetes mellitus  Assessment and Plan of Treatment: HgA1C this admission.  Make sure you get your HgA1c checked every three months.  If you take oral diabetes medications, check your blood glucose two times a day.  If you take insulin, check your blood glucose before meals and at bedtime.  It's important not to skip any meals.  Keep a log of your blood glucose results and always take it with you to your doctor appointments.  Keep a list of your current medications including injectables and over the counter medications and bring this medication list with you to all your doctor appointments.  If you have not seen your ophthalmologist this year call for appointment.  Check your feet daily for redness, sores, or openings. Do not self treat. If no improvement in two days call your primary care physician for an appointment.  Low blood sugar (hypoglycemia) is a blood sugar below 70mg/dl. Check your blood sugar if you feel signs/symptoms of hypoglycemia. If your blood sugar is below 70 take 15 grams of carbohydrates (ex 4 oz of apple juice, 3-4 glucose tablets, or 4-6 oz of regular soda) wait 15 minutes and repeat blood sugar to make sure it comes up above 70.  If your blood sugar is above 70 and you are due for a meal, have a meal.  If you are not due for a meal have a snack.  This snack helps keeps your blood sugar at a safe range.

## 2023-05-05 NOTE — DISCHARGE NOTE PROVIDER - HOSPITAL COURSE
69 y/o M withy a h/o HTN, DM, pAfib, CAD s/p 5 stents (last 3/2023) presenting with CLEARY. Patient last presented 3/7 for progressive SOB, underwent LHC, s/p stent *1. Since then, has had SOB with ambulating 2 blocks, relieved by rest.   In ED, afeb hds, satting well on RA. trop neg. ProBNP 4K, received lasix 20 IV *1 and admitted to medicine for acute on chronic diastolic CHF. Received Lasix 20 IV BID. Cardiology appreciated. CTPA and the elevated BNP are consistent with CHF.  Diuresed well. symptoms improved. Now euvolemic. ECHO showed      Stable for DC to home to  resume Lasix 40 mg qd per cardiologyg.   Medically cleared for discharge home with cardiology follow up.              69 y/o M withy a h/o HTN, DM, pAfib, CAD s/p 5 stents (last 3/2023) presenting with CLEARY. Patient last presented 3/7 for progressive SOB, underwent LHC, s/p stent *1. Since then, has had SOB with ambulating 2 blocks, relieved by rest.   In ED, afeb hds, satting well on RA. trop neg. ProBNP 4K, received lasix 20 IV *1 and admitted to medicine for acute on chronic diastolic CHF. Received Lasix 20 IV BID. Cardiology appreciated. CTPA and the elevated BNP are consistent with CHF.  Diuresed well. symptoms improved. Now euvolemic. ECHO completed. Stable for DC to home to  resume Lasix 40 mg qd per cardiology.   Medically cleared for discharge home with cardiology follow up for echo results.

## 2023-05-05 NOTE — CONSULT NOTE ADULT - SUBJECTIVE AND OBJECTIVE BOX
68 year old male with CAD S/P LAD stent in the past and now S/P recent proximal Cx stent on 3/7/23. He does have residual distal RCA disease.  He now presents with 2 weeks of productive cough.  He denies orthopnea or increased edema.  He denies chest pain.  A CTPA revealed no evidence of a pulmonary embolism but was consistent with pulmonary edema.  He was given a small dose of IV Lasix in the ER with symptomatic improvement    PMH:  NIDDM  atrial flutter S/P YVES DCCV    Meds:  Plavix 75 mg qd             Atorvastatin 80 mg qd             Zetia 10 mg qd             Metoprolol 50 mg qd             Diltiazem 180 mg qd             Xarelto 20 mg qd             Januvia 100 mg qd             Lasix 40 mg qd - unclear if the patient was taking this as an out patient    /90  HR 76  O2 sat RA 96%  Lungs clear  RR 1/6 systolic murmur  No edema    Troponin  24  24  BUN 24  Crt 1.37  CBC  normal  BNP 4439    EKG: not available    CTPA:  No pulmonary embolism  + pulmonary edema    Most recent out patient Echo - 2/17/22  EF 54% moderate MR    Imp:  CTPA and the elevated BNP are consistent with CHF.  At present he is well compensated  MI has been ruled out   No evidence of acute ischemia or stent restenosis  Rec:  Stable for DC to home  Would resume Lasix 40 mg qd  Will check Echo

## 2023-05-05 NOTE — DISCHARGE NOTE NURSING/CASE MANAGEMENT/SOCIAL WORK - PATIENT PORTAL LINK FT
You can access the FollowMyHealth Patient Portal offered by [facility] by registering at the following website: [email or web address]. By joining [facility]’s FollowMyHealth portal, you will also be able to view your health information using other applications (apps) compatible with our system.

## 2023-05-05 NOTE — PROGRESS NOTE ADULT - ASSESSMENT
67 y/o M withy a h/o HTN, DM, pAfib, CAD s/p 5 stents (last 3/2023) presenting with acute on chronic HF.       Problem/Plan - 1:  ·  Problem: Acute on chronic diastolic HF (heart failure).   ·  Plan: Previous notes with signs of HF, TTE with normal systolic function.  d/w pt and cardio  pt apprantely was supposed to be on ;lasix however has nt been taking     Problem/Plan - 2:  ·  Problem: Stented coronary artery.   ·  Plan: s/p multiple stents, last 3/2023  -trops neg, no cp  -continue xarelto plavix  -continue metop, statin.    Problem/Plan - 3:  ·  Problem: Paroxysmal atrial fibrillation.   ·  Plan: currently regular, appears sinus  -continue metop, dilt, xarelto.    Problem/Plan - 4:  ·  Problem: Essential hypertension, hypertension with unspecified goal.   ·  Plan: elevated on presentation  -continue metop, diltiazem.    Problem/Plan - 5:  ·  Problem: Type 2 diabetes mellitus.   ·  Plan: Takes LA 28-40 qhs depending on FS  -ordered for La insulin 22 units qhs + ISS.    Problem/Plan - 6:  ·  Problem: Hyperlipidemia, unspecified hyperlipidemia type.   ·  Plan: -Atorvastatin 80mg  -resume ezetimibe on d/c.    if no acute findings on echo pt can be dced

## 2023-05-05 NOTE — PROGRESS NOTE ADULT - SUBJECTIVE AND OBJECTIVE BOX
Date of service: 05-05-23 @ 16:17      Patient is a 68y old  Male who presents with a chief complaint of SOB (05 May 2023 14:31)                                                               INTERVAL HPI/OVERNIGHT EVENTS:    REVIEW OF SYSTEMS:     CONSTITUTIONAL: No weakness, fevers or chills  EYES/ENT: No visual changes , no ear ache   NECK: No pain or stiffness  RESPIRATORY: No cough, wheezing,  No shortness of breath  CARDIOVASCULAR: No chest pain or palpitations  GASTROINTESTINAL: No abdominal pain  . No nausea, vomiting, or hematemesis; No diarrhea or constipation. No melena or hematochezia.  GENITOURINARY: No dysuria, frequency or hematuria  NEUROLOGICAL: No numbness or weakness  SKIN: No itching, burning, rashes, or lesions                                                                                                                                                                                                                                                                                 Medications:  MEDICATIONS  (STANDING):  atorvastatin 80 milliGRAM(s) Oral at bedtime  clopidogrel Tablet 75 milliGRAM(s) Oral daily  dextrose 5%. 1000 milliLiter(s) (50 mL/Hr) IV Continuous <Continuous>  dextrose 5%. 1000 milliLiter(s) (100 mL/Hr) IV Continuous <Continuous>  dextrose 50% Injectable 25 Gram(s) IV Push once  dextrose 50% Injectable 12.5 Gram(s) IV Push once  dextrose 50% Injectable 25 Gram(s) IV Push once  diltiazem    milliGRAM(s) Oral daily  furosemide   Injectable 20 milliGRAM(s) IV Push two times a day  glucagon  Injectable 1 milliGRAM(s) IntraMuscular once  insulin glargine Injectable (LANTUS) 22 Unit(s) SubCutaneous at bedtime  insulin lispro (ADMELOG) corrective regimen sliding scale   SubCutaneous at bedtime  insulin lispro (ADMELOG) corrective regimen sliding scale   SubCutaneous three times a day before meals  insulin lispro Injectable (ADMELOG) 6 Unit(s) SubCutaneous three times a day before meals  metoprolol succinate ER 50 milliGRAM(s) Oral daily  rivaroxaban 20 milliGRAM(s) Oral with dinner    MEDICATIONS  (PRN):  dextrose Oral Gel 15 Gram(s) Oral once PRN Blood Glucose LESS THAN 70 milliGRAM(s)/deciliter       Allergies    No Known Allergies    Intolerances      Vital Signs Last 24 Hrs  T(C): 36.8 (05 May 2023 12:14), Max: 36.9 (04 May 2023 21:26)  T(F): 98.2 (05 May 2023 12:14), Max: 98.4 (04 May 2023 21:26)  HR: 62 (05 May 2023 12:14) (62 - 76)  BP: 145/74 (05 May 2023 12:14) (145/74 - 171/72)  BP(mean): --  RR: 18 (05 May 2023 12:14) (18 - 18)  SpO2: 95% (05 May 2023 12:14) (93% - 96%)    Parameters below as of 05 May 2023 12:14  Patient On (Oxygen Delivery Method): room air      CAPILLARY BLOOD GLUCOSE      POCT Blood Glucose.: 345 mg/dL (05 May 2023 12:59)  POCT Blood Glucose.: 245 mg/dL (05 May 2023 09:02)  POCT Blood Glucose.: 302 mg/dL (04 May 2023 22:51)  POCT Blood Glucose.: 314 mg/dL (04 May 2023 21:32)  POCT Blood Glucose.: 175 mg/dL (04 May 2023 17:25)      05-04 @ 07:01  -  05-05 @ 07:00  --------------------------------------------------------  IN: 0 mL / OUT: 100 mL / NET: -100 mL    05-05 @ 07:01  -  05-05 @ 16:17  --------------------------------------------------------  IN: 260 mL / OUT: 300 mL / NET: -40 mL      Physical Exam:    Daily     Daily   General:  Well appearing, NAD, not cachetic  HEENT:  Nonicteric, PERRLA  CV:  RRR, S1S2   Lungs:  CTA B/L, no wheezes, rales, rhonchi  Abdomen:  Soft, non-tender, no distended, positive BS  Extremities:  2+ pulses, no c/c, no edema  Skin:  Warm and dry, no rashes  :  No moreno  Neuro:  AAOx3, non-focal, grossly intact                                                                                                                                                                                                                                                                                                LABS:                               13.9   10.37 )-----------( 233      ( 05 May 2023 09:45 )             40.2                      05-05    137  |  101  |  24<H>  ----------------------------<  198<H>  4.1   |  24  |  1.37<H>    Ca    9.4      05 May 2023 07:12  Mg     2.2     05-05    TPro  6.8  /  Alb  4.3  /  TBili  3.0<H>  /  DBili  x   /  AST  24  /  ALT  20  /  AlkPhos  145<H>  05-03                       RADIOLOGY & ADDITIONAL TESTS         I personally reviewed: [  ]EKG   [  ]CXR    [  ] CT      A/P:         Discussed with :     Derek consultants' Notes   Time spent :

## 2023-05-05 NOTE — DISCHARGE NOTE PROVIDER - NSDCMRMEDTOKEN_GEN_ALL_CORE_FT
atorvastatin 80 mg oral tablet: 1 tab(s) orally once a day   clopidogrel 75 mg oral tablet: 1 tab(s) orally once a day  dilTIAZem 180 mg/24 hours oral capsule, extended release: 1 cap(s) orally once a day  ezetimibe 10 mg oral tablet: 1 tab(s) orally once a day  Lasix 40 mg oral tablet: 1 tab(s) orally once a day  metoprolol succinate 50 mg oral tablet, extended release: 1 tab(s) orally once a day  OTC MEDS: Multivitamin, Zinc, Vitamin D3, Vitamin C:   Semglee (Prefilled Pen) 100 units/mL subcutaneous solution: 30-40units subcutaneously once daily  Xarelto 20 mg oral tablet: 1 tab(s) orally once a day (before a meal) RESUME ON 3/8/23

## 2023-05-05 NOTE — CONSULT NOTE ADULT - PROBLEM SELECTOR RECOMMENDATION 9
Will increase Lantus to 22 units at bed time.  Will increase Admelog to 6 units before each meal in addition to Admelog correction scale coverage.  Will continue monitoring FS, log, and glucose trends, will Follow up.  Patient counseled for compliance with consistent low carb diet and exercise as tolerated outpatient.

## 2023-05-05 NOTE — CHART NOTE - NSCHARTNOTEFT_GEN_A_CORE
(3) slightly limited
Nutrition Services    Consult received for "MST Score >2." Upon chart review, patient with stable weight, does not currently meet criteria for MST, RD remains available for assessment per protocol or as needed.     Charlene Nye MS, RD, CDN #[phone]

## 2023-05-05 NOTE — DISCHARGE NOTE PROVIDER - CARE PROVIDER_API CALL
Toro Rivas)  Internal Medicine  [address] [facility] [address], [address]  [place], [place] [id number]  Phone: [phone]  Fax: [phone]  Follow Up Time: 1 week

## 2023-05-05 NOTE — DISCHARGE NOTE NURSING/CASE MANAGEMENT/SOCIAL WORK - NSDCPEFALRISK_GEN_ALL_CORE
For information on Fall & Injury Prevention, visit: [email or web address] OR  [email or web address] OR  https://www.cdc.gov/steadi/patient.html

## 2023-05-05 NOTE — CONSULT NOTE ADULT - SUBJECTIVE AND OBJECTIVE BOX
HPI:  69 y/o M presenting with SOB for 2 months    69 y/o M with a history of  CAD s/p 5 stents (last 3/2023), HTN, HLD HFpEF, DM2, pAfib presenting with CLEARY. Patient last presented 3/7 for progressive SOB, underwent LHC, s/p stent *1. Since then, has had SOB with ambulating 2 blocks, relieved by rest. Denies cp/PND/orthopnea. Denies LE swelling. Denies palpitations. Has had ongoing non productive cough over this time period. Denies f/chills. Presented to outpatient pcp for this who recommended presenting to the ED.     In ED, afeb hds, satting well on RA. trop neg. ProBNP 4K, received lasix 20 IV *1 and admitted to medicine.  (04 May 2023 15:11)      Patient has history of diabetes, A1C  8.2 % on home insulin   Endo was consulted for glycemic control.      PAST MEDICAL & SURGICAL HISTORY:  Former smoker      CAD in native artery      Type 2 diabetes mellitus      Hyperlipidemia, unspecified hyperlipidemia type      Essential hypertension, hypertension with unspecified goal      Afib      Hematoma of leg      Stented coronary artery      CHF (congestive heart failure)      s/p Carotid Endarterectomy  left          FAMILY HISTORY:  Family history of heart disease  father  age 71    FH: atrial fibrillation  sister        Social History:  lives with wife  no etoh/illicits (04 May 2023 15:11)            HOME MEDICATIONS:  Home Medications:  dilTIAZem 180 mg/24 hours oral capsule, extended release: 1 cap(s) orally once a day (04 May 2023 10:02)  ezetimibe 10 mg oral tablet: 1 tab(s) orally once a day (04 May 2023 10:02)  metoprolol succinate 50 mg oral tablet, extended release: 1 tab(s) orally once a day (04 May 2023 10:02)  OTC MEDS: Multivitamin, Zinc, Vitamin D3, Vitamin C:  (04 May 2023 10:02)  Semglee (Prefilled Pen) 100 units/mL subcutaneous solution: 30-40units subcutaneously once daily (04 May 2023 10:02)  Xarelto 20 mg oral tablet: 1 tab(s) orally once a day (before a meal) RESUME ON 3/8/23 (04 May 2023 10:02)            MEDICATIONS  (STANDING):  atorvastatin 80 milliGRAM(s) Oral at bedtime  clopidogrel Tablet 75 milliGRAM(s) Oral daily  dextrose 5%. 1000 milliLiter(s) (100 mL/Hr) IV Continuous <Continuous>  dextrose 5%. 1000 milliLiter(s) (50 mL/Hr) IV Continuous <Continuous>  dextrose 50% Injectable 25 Gram(s) IV Push once  dextrose 50% Injectable 25 Gram(s) IV Push once  dextrose 50% Injectable 12.5 Gram(s) IV Push once  diltiazem    milliGRAM(s) Oral daily  furosemide   Injectable 20 milliGRAM(s) IV Push two times a day  glucagon  Injectable 1 milliGRAM(s) IntraMuscular once  insulin glargine Injectable (LANTUS) 22 Unit(s) SubCutaneous at bedtime  insulin lispro (ADMELOG) corrective regimen sliding scale   SubCutaneous at bedtime  insulin lispro (ADMELOG) corrective regimen sliding scale   SubCutaneous three times a day before meals  insulin lispro Injectable (ADMELOG) 6 Unit(s) SubCutaneous three times a day before meals  metoprolol succinate ER 50 milliGRAM(s) Oral daily  rivaroxaban 20 milliGRAM(s) Oral with dinner    MEDICATIONS  (PRN):  dextrose Oral Gel 15 Gram(s) Oral once PRN Blood Glucose LESS THAN 70 milliGRAM(s)/deciliter      Allergies    No Known Allergies    Intolerances        Review of Systems:  Neuro: No HA, no dizziness  Cardiovascular: No chest pain, no palpitations  Respiratory: no SOB, no cough  GI: No nausea, vomiting, abdominal pain  MSK: Denies joint/muscle pain      ALL OTHER SYSTEMS REVIEWED AND NEGATIVE      PHYSICAL EXAM:  VITALS: T(C): 36.8 (23 @ 12:14)  T(F): 98.2 (23 @ 12:14), Max: 98.4 (23 @ 15:11)  HR: 62 (23 @ 12:14) (62 - 93)  BP: 145/74 (23 @ 12:14) (145/74 - 171/72)  RR:  (18 - 18)  SpO2:  (93% - 96%)  Wt(kg): --  GENERAL: NAD, well-groomed, well-developed  NEURO:  alert and oriented  RESPIRATORY: Clear to auscultation bilaterally; No rales, rhonchi, wheezing  CARDIOVASCULAR: Si S2  GI: Soft, non distended, normal bowel sounds  MUSCULOSKELETAL: Moves all extremities equally       POCT Blood Glucose.: 345 mg/dL (23 @ 12:59)  POCT Blood Glucose.: 245 mg/dL (23 @ 09:02)  POCT Blood Glucose.: 302 mg/dL (23 @ 22:51)  POCT Blood Glucose.: 314 mg/dL (23 @ 21:32)  POCT Blood Glucose.: 175 mg/dL (23 @ 17:25)  POCT Blood Glucose.: 324 mg/dL (23 @ 11:28)  POCT Blood Glucose.: 96 mg/dL (23 @ 08:53)                            13.9   10.37 )-----------( 233      ( 05 May 2023 09:45 )             40.2           137  |  101  |  24<H>  ----------------------------<  198<H>  4.1   |  24  |  1.37<H>    eGFR: 57<L>    Ca    9.4        Mg     2.2         TPro  6.8  /  Alb  4.3  /  TBili  3.0<H>  /  DBili  x   /  AST  24  /  ALT  20  /  AlkPhos  145<H>  05-03      Thyroid Function Tests:    Diet, Consistent Carbohydrate Renal/No Snacks:   DASH/TLC Sodium & Cholesterol Restricted (DASH) (23 @ 09:05) [Active]          A1C with Estimated Average Glucose Result: 8.2 % (23 @ 12:15)                   HPI:  69 y/o M presenting with SOB for 2 months    69 y/o M with a history of  CAD s/p 5 stents (last 3/2023), HTN, HLD HFpEF, DM2, pAfib presenting with CLAERY. Patient last presented 3/7 for progressive SOB, underwent LHC, s/p stent *1. Since then, has had SOB with ambulating 2 blocks, relieved by rest. Denies cp/PND/orthopnea. Denies LE swelling. Denies palpitations. Has had ongoing non productive cough over this time period. Denies f/chills. Presented to outpatient pcp for this who recommended presenting to the ED.     In ED, afeb hds, satting well on RA. trop neg. ProBNP 4K, received lasix 20 IV *1 and admitted to medicine.  (04 May 2023 15:11)      Patient has history of diabetes, A1C  8.2 % on home insulin   Endo was consulted for glycemic control.      PAST MEDICAL & SURGICAL HISTORY:  Former smoker      CAD in native artery      Type 2 diabetes mellitus      Hyperlipidemia, unspecified hyperlipidemia type      Essential hypertension, hypertension with unspecified goal      Afib      Hematoma of leg      Stented coronary artery      CHF (congestive heart failure)      s/p Carotid Endarterectomy  left          FAMILY HISTORY:  Family history of heart disease  father  age 71    FH: atrial fibrillation  sister        Social History:  lives with wife  no etoh/illicits (04 May 2023 15:11)            HOME MEDICATIONS:  Home Medications:  dilTIAZem 180 mg/24 hours oral capsule, extended release: 1 cap(s) orally once a day (04 May 2023 10:02)  ezetimibe 10 mg oral tablet: 1 tab(s) orally once a day (04 May 2023 10:02)  metoprolol succinate 50 mg oral tablet, extended release: 1 tab(s) orally once a day (04 May 2023 10:02)  OTC MEDS: Multivitamin, Zinc, Vitamin D3, Vitamin C:  (04 May 2023 10:02)  Semglee (Prefilled Pen) 100 units/mL subcutaneous solution: 30-40units subcutaneously once daily (04 May 2023 10:02)  Xarelto 20 mg oral tablet: 1 tab(s) orally once a day (before a meal) RESUME ON 3/8/23 (04 May 2023 10:02)            MEDICATIONS  (STANDING):  atorvastatin 80 milliGRAM(s) Oral at bedtime  clopidogrel Tablet 75 milliGRAM(s) Oral daily  dextrose 5%. 1000 milliLiter(s) (100 mL/Hr) IV Continuous <Continuous>  dextrose 5%. 1000 milliLiter(s) (50 mL/Hr) IV Continuous <Continuous>  dextrose 50% Injectable 25 Gram(s) IV Push once  dextrose 50% Injectable 25 Gram(s) IV Push once  dextrose 50% Injectable 12.5 Gram(s) IV Push once  diltiazem    milliGRAM(s) Oral daily  furosemide   Injectable 20 milliGRAM(s) IV Push two times a day  glucagon  Injectable 1 milliGRAM(s) IntraMuscular once  insulin glargine Injectable (LANTUS) 22 Unit(s) SubCutaneous at bedtime  insulin lispro (ADMELOG) corrective regimen sliding scale   SubCutaneous at bedtime  insulin lispro (ADMELOG) corrective regimen sliding scale   SubCutaneous three times a day before meals  insulin lispro Injectable (ADMELOG) 6 Unit(s) SubCutaneous three times a day before meals  metoprolol succinate ER 50 milliGRAM(s) Oral daily  rivaroxaban 20 milliGRAM(s) Oral with dinner    MEDICATIONS  (PRN):  dextrose Oral Gel 15 Gram(s) Oral once PRN Blood Glucose LESS THAN 70 milliGRAM(s)/deciliter      Allergies    No Known Allergies    Intolerances        Review of Systems:  Neuro: No HA, no dizziness  Cardiovascular: No chest pain, no palpitations  Respiratory: no SOB, no cough  GI: No nausea, vomiting, abdominal pain  MSK: Denies joint/muscle pain      ALL OTHER SYSTEMS REVIEWED AND NEGATIVE      PHYSICAL EXAM:  VITALS: T(C): 36.8 (23 @ 12:14)  T(F): 98.2 (23 @ 12:14), Max: 98.4 (23 @ 15:11)  HR: 62 (23 @ 12:14) (62 - 93)  BP: 145/74 (23 @ 12:14) (145/74 - 171/72)  RR:  (18 - 18)  SpO2:  (93% - 96%)  Wt(kg): --  GENERAL: NAD, well-groomed, well-developed  NEURO:  alert and oriented  RESPIRATORY: Clear to auscultation bilaterally; No rales, rhonchi, wheezing  CARDIOVASCULAR: Si S2  GI: Soft, non distended, normal bowel sounds  MUSCULOSKELETAL: Moves all extremities equally       POCT Blood Glucose.: 345 mg/dL (23 @ 12:59)  POCT Blood Glucose.: 245 mg/dL (23 @ 09:02)  POCT Blood Glucose.: 302 mg/dL (23 @ 22:51)  POCT Blood Glucose.: 314 mg/dL (23 @ 21:32)  POCT Blood Glucose.: 175 mg/dL (23 @ 17:25)  POCT Blood Glucose.: 324 mg/dL (23 @ 11:28)  POCT Blood Glucose.: 96 mg/dL (23 @ 08:53)                            13.9   10.37 )-----------( 233      ( 05 May 2023 09:45 )             40.2           137  |  101  |  24<H>  ----------------------------<  198<H>  4.1   |  24  |  1.37<H>    eGFR: 57<L>    Ca    9.4        Mg     2.2         TPro  6.8  /  Alb  4.3  /  TBili  3.0<H>  /  DBili  x   /  AST  24  /  ALT  20  /  AlkPhos  145<H>  05-03      Thyroid Function Tests:    Diet, Consistent Carbohydrate Renal/No Snacks:   DASH/TLC Sodium & Cholesterol Restricted (DASH) (23 @ 09:05) [Active]          A1C with Estimated Average Glucose Result: 8.2 % (23 @ 12:15)

## 2023-05-27 ENCOUNTER — INPATIENT (INPATIENT)
Facility: HOSPITAL | Age: 68
LOS: 7 days | Discharge: HOME HEALTH | DRG: 246 | End: 2023-06-04
Attending: GENERAL ACUTE CARE HOSPITAL | Admitting: HOSPITALIST
Payer: COMMERCIAL

## 2023-05-27 VITALS
RESPIRATION RATE: 18 BRPM | OXYGEN SATURATION: 98 % | SYSTOLIC BLOOD PRESSURE: 126 MMHG | WEIGHT: 139.99 LBS | DIASTOLIC BLOOD PRESSURE: 97 MMHG | HEART RATE: 165 BPM | HEIGHT: 66 IN

## 2023-05-27 LAB
ALBUMIN SERPL ELPH-MCNC: 4.1 G/DL — SIGNIFICANT CHANGE UP (ref 3.3–5)
ALP SERPL-CCNC: 149 U/L — HIGH (ref 40–120)
ALT FLD-CCNC: 18 U/L — SIGNIFICANT CHANGE UP (ref 10–45)
ANION GAP SERPL CALC-SCNC: 19 MMOL/L — HIGH (ref 5–17)
APTT BLD: 45 SEC — HIGH (ref 27.5–35.5)
AST SERPL-CCNC: 33 U/L — SIGNIFICANT CHANGE UP (ref 10–40)
BASOPHILS # BLD AUTO: 0.08 K/UL — SIGNIFICANT CHANGE UP (ref 0–0.2)
BASOPHILS NFR BLD AUTO: 0.6 % — SIGNIFICANT CHANGE UP (ref 0–2)
BILIRUB SERPL-MCNC: 3.2 MG/DL — HIGH (ref 0.2–1.2)
BUN SERPL-MCNC: 22 MG/DL — SIGNIFICANT CHANGE UP (ref 7–23)
CALCIUM SERPL-MCNC: 9.4 MG/DL — SIGNIFICANT CHANGE UP (ref 8.4–10.5)
CHLORIDE SERPL-SCNC: 95 MMOL/L — LOW (ref 96–108)
CO2 SERPL-SCNC: 21 MMOL/L — LOW (ref 22–31)
CREAT SERPL-MCNC: 1.41 MG/DL — HIGH (ref 0.5–1.3)
EGFR: 55 ML/MIN/1.73M2 — LOW
EOSINOPHIL # BLD AUTO: 0.07 K/UL — SIGNIFICANT CHANGE UP (ref 0–0.5)
EOSINOPHIL NFR BLD AUTO: 0.5 % — SIGNIFICANT CHANGE UP (ref 0–6)
GAS PNL BLDV: SIGNIFICANT CHANGE UP
GLUCOSE SERPL-MCNC: 298 MG/DL — HIGH (ref 70–99)
HCT VFR BLD CALC: 40 % — SIGNIFICANT CHANGE UP (ref 39–50)
HGB BLD-MCNC: 13.6 G/DL — SIGNIFICANT CHANGE UP (ref 13–17)
IMM GRANULOCYTES NFR BLD AUTO: 0.5 % — SIGNIFICANT CHANGE UP (ref 0–0.9)
INR BLD: 4.08 RATIO — HIGH (ref 0.88–1.16)
LYMPHOCYTES # BLD AUTO: 0.84 K/UL — LOW (ref 1–3.3)
LYMPHOCYTES # BLD AUTO: 5.8 % — LOW (ref 13–44)
MAGNESIUM SERPL-MCNC: 2.3 MG/DL — SIGNIFICANT CHANGE UP (ref 1.6–2.6)
MCHC RBC-ENTMCNC: 30.7 PG — SIGNIFICANT CHANGE UP (ref 27–34)
MCHC RBC-ENTMCNC: 34 GM/DL — SIGNIFICANT CHANGE UP (ref 32–36)
MCV RBC AUTO: 90.3 FL — SIGNIFICANT CHANGE UP (ref 80–100)
MONOCYTES # BLD AUTO: 0.65 K/UL — SIGNIFICANT CHANGE UP (ref 0–0.9)
MONOCYTES NFR BLD AUTO: 4.5 % — SIGNIFICANT CHANGE UP (ref 2–14)
NEUTROPHILS # BLD AUTO: 12.83 K/UL — HIGH (ref 1.8–7.4)
NEUTROPHILS NFR BLD AUTO: 88.1 % — HIGH (ref 43–77)
NRBC # BLD: 0 /100 WBCS — SIGNIFICANT CHANGE UP (ref 0–0)
NT-PROBNP SERPL-SCNC: 2930 PG/ML — HIGH (ref 0–300)
PLATELET # BLD AUTO: 344 K/UL — SIGNIFICANT CHANGE UP (ref 150–400)
POTASSIUM SERPL-MCNC: 4.4 MMOL/L — SIGNIFICANT CHANGE UP (ref 3.5–5.3)
POTASSIUM SERPL-SCNC: 4.4 MMOL/L — SIGNIFICANT CHANGE UP (ref 3.5–5.3)
PROT SERPL-MCNC: 7.3 G/DL — SIGNIFICANT CHANGE UP (ref 6–8.3)
PROTHROM AB SERPL-ACNC: 48 SEC — HIGH (ref 10.5–13.4)
RBC # BLD: 4.43 M/UL — SIGNIFICANT CHANGE UP (ref 4.2–5.8)
RBC # FLD: 13.2 % — SIGNIFICANT CHANGE UP (ref 10.3–14.5)
SODIUM SERPL-SCNC: 135 MMOL/L — SIGNIFICANT CHANGE UP (ref 135–145)
TROPONIN T, HIGH SENSITIVITY RESULT: 76 NG/L — HIGH (ref 0–51)
WBC # BLD: 14.54 K/UL — HIGH (ref 3.8–10.5)
WBC # FLD AUTO: 14.54 K/UL — HIGH (ref 3.8–10.5)

## 2023-05-27 PROCEDURE — 71045 X-RAY EXAM CHEST 1 VIEW: CPT | Mod: 26

## 2023-05-27 PROCEDURE — 99285 EMERGENCY DEPT VISIT HI MDM: CPT

## 2023-05-27 PROCEDURE — 71275 CT ANGIOGRAPHY CHEST: CPT | Mod: 26,MA

## 2023-05-27 PROCEDURE — 74174 CTA ABD&PLVS W/CONTRAST: CPT | Mod: 26,MA

## 2023-05-27 RX ORDER — METOPROLOL TARTRATE 50 MG
10 TABLET ORAL ONCE
Refills: 0 | Status: COMPLETED | OUTPATIENT
Start: 2023-05-27 | End: 2023-05-27

## 2023-05-27 RX ORDER — LABETALOL HCL 100 MG
10 TABLET ORAL ONCE
Refills: 0 | Status: COMPLETED | OUTPATIENT
Start: 2023-05-27 | End: 2023-05-27

## 2023-05-27 RX ORDER — PANTOPRAZOLE SODIUM 20 MG/1
80 TABLET, DELAYED RELEASE ORAL ONCE
Refills: 0 | Status: COMPLETED | OUTPATIENT
Start: 2023-05-27 | End: 2023-05-27

## 2023-05-27 RX ORDER — METOPROLOL TARTRATE 50 MG
5 TABLET ORAL ONCE
Refills: 0 | Status: COMPLETED | OUTPATIENT
Start: 2023-05-27 | End: 2023-05-27

## 2023-05-27 RX ORDER — LABETALOL HCL 100 MG
10 TABLET ORAL ONCE
Refills: 0 | Status: DISCONTINUED | OUTPATIENT
Start: 2023-05-27 | End: 2023-05-27

## 2023-05-27 RX ADMIN — Medication 5 MILLIGRAM(S): at 22:00

## 2023-05-27 RX ADMIN — PANTOPRAZOLE SODIUM 80 MILLIGRAM(S): 20 TABLET, DELAYED RELEASE ORAL at 21:58

## 2023-05-27 RX ADMIN — Medication 10 MILLIGRAM(S): at 21:13

## 2023-05-27 RX ADMIN — Medication 5 MILLIGRAM(S): at 20:51

## 2023-05-27 RX ADMIN — Medication 10 MILLIGRAM(S): at 23:49

## 2023-05-27 NOTE — ED PROVIDER NOTE - PROGRESS NOTE DETAILS
Patrice Melendez MD:  Trop elevation, starting brilinta and heparin, awaiting call back by Dr. Rivas' office, will admit for NSTEMI and planned cath tomorrow. Patrice Melendez MD:  No emergent cath per cardiology, attempted call to Dr. Mcdaniel

## 2023-05-27 NOTE — ED PROVIDER NOTE - NS ED ROS FT
GENERAL: No fever or chills  EYES: No change in vision  HEENT: No trouble swallowing or speaking  CARDIAC: + chest pain  PULMONARY: No cough or SOB  GI: No abdominal pain, no nausea or no vomiting, no diarrhea or constipation  : No changes in urination  NEURO: No headache, no numbness, no focal numbness  MSK: No joint pain  Otherwise as HPI or negative.

## 2023-05-27 NOTE — ED PROVIDER NOTE - OBJECTIVE STATEMENT
68-year-old male history of hypertension, pAFIB on xeralto, plavix, CAD with x5 episodes of stent placement last in March of this year, presenting with new onset chest pain. States while cooking today experienced new onset left-sided chest pain, sharp on left-sided chest rating to back.  Denies any associated numbness, loss of sensation, extremity weakness,  Syncopal episode, altered mental status.

## 2023-05-27 NOTE — ED PROVIDER NOTE - ATTENDING CONTRIBUTION TO CARE
MD Grayson:  patient seen and evaluated with the resident.  I was present for key portions of the History & Physical, and I agree with the Impression & Plan.    Patient is a 68-year-old male complaining of sudden onset chest pain while cooking dinner tonight.  Pain is sharp and radiates to the back on the left side.  Hypertension measured at the scene 170/90, nitroglycerin given with improvement in blood pressure but heart rate increased which exacerbated his pain.    Patient has a medical history of ACS, has 5 stents most recent cardiac catheterization March 7, 2023 which showed a 90% left circumflex lesion that was stented.  Subsequent ED presentation for chest pain of similar quality May 4 showed a delta troponin of 0 elevated proBNP negative CTA chest for PE.  Diagnosis at that time was thought to be CHF exacerbation    Vital signs: Heart rate 128, blood pressure 160/70  Gen: Adult male, uncomfortable-appearing.  Head: NC/AT.   PERRL, EOMI.  Neck: trachea midline, supple.  Resp:  No distress, CTA B.  Cardiac RRR, no RMG.    Abdomen:  soft, nondistended, nontender; no R/G.  Ext: no deformities, no edema.  Neuro:  A&Ox4 appears non focal. Skin:  Warm and dry as visualized, no rash.   Psych:  Normal affect and mood.    Medical decision making: A-fib with RVR and associated chest pain radiating to the back.  This constellation of symptoms always warrants concern for aortic dissection which will be pursued with CTA chest.  However his chest pain may also be rate related ACS and we will attempt to rate control with metoprolol.  Patient takes metoprolol and diltiazem as an outpatient per last cardiology note May 5, 2023.  Also takes Xaralto for his afib; last dose unk.

## 2023-05-27 NOTE — ED PROVIDER NOTE - PHYSICAL EXAMINATION
Physical Exam:  General: NAD, Conversive  Eyes: EOMI, Conjunctiva and sclera clear  Neck: No JVD  Lungs: Clear to auscultation bilaterally, no wheeze, no rhonchi  Heart: Irregularly irregular, tachycardic  Abdomen: Soft, nontender, nondistended, no CVA tenderness  Extremities: 2+ peripheral pulses, no edema  Psych: AAO X3  Neurologic: Non-focal

## 2023-05-27 NOTE — ED PROVIDER NOTE - CLINICAL SUMMARY MEDICAL DECISION MAKING FREE TEXT BOX
68 Y M presenting with chest pain, improved after 68 Y M presenting with chest pain, significant cardiac history, primary concern for 68 Y M presenting with chest pain, significant cardiac history, primary concern for ACS, common labs, rate control, no significant changes on ECG, aspirin taken prior to arrival, will evaluate for ACS, CTPA in setting of chest pain radiating to back, r/O dissection, likely admit for high risk CP.

## 2023-05-28 DIAGNOSIS — R93.89 ABNORMAL FINDINGS ON DIAGNOSTIC IMAGING OF OTHER SPECIFIED BODY STRUCTURES: ICD-10-CM

## 2023-05-28 DIAGNOSIS — I21.4 NON-ST ELEVATION (NSTEMI) MYOCARDIAL INFARCTION: ICD-10-CM

## 2023-05-28 DIAGNOSIS — E11.9 TYPE 2 DIABETES MELLITUS WITHOUT COMPLICATIONS: ICD-10-CM

## 2023-05-28 DIAGNOSIS — Z02.9 ENCOUNTER FOR ADMINISTRATIVE EXAMINATIONS, UNSPECIFIED: ICD-10-CM

## 2023-05-28 DIAGNOSIS — I48.91 UNSPECIFIED ATRIAL FIBRILLATION: ICD-10-CM

## 2023-05-28 LAB
ANION GAP SERPL CALC-SCNC: 16 MMOL/L — SIGNIFICANT CHANGE UP (ref 5–17)
APTT BLD: 101.8 SEC — HIGH (ref 27.5–35.5)
APTT BLD: 59.9 SEC — HIGH (ref 27.5–35.5)
BUN SERPL-MCNC: 23 MG/DL — SIGNIFICANT CHANGE UP (ref 7–23)
CALCIUM SERPL-MCNC: 9.1 MG/DL — SIGNIFICANT CHANGE UP (ref 8.4–10.5)
CHLORIDE SERPL-SCNC: 100 MMOL/L — SIGNIFICANT CHANGE UP (ref 96–108)
CK MB BLD-MCNC: 14.6 % — HIGH (ref 0–3.5)
CK MB CFR SERPL CALC: 81.6 NG/ML — HIGH (ref 0–6.7)
CK SERPL-CCNC: 557 U/L — HIGH (ref 30–200)
CO2 SERPL-SCNC: 21 MMOL/L — LOW (ref 22–31)
CREAT SERPL-MCNC: 1.32 MG/DL — HIGH (ref 0.5–1.3)
EGFR: 59 ML/MIN/1.73M2 — LOW
GLUCOSE BLDC GLUCOMTR-MCNC: 241 MG/DL — HIGH (ref 70–99)
GLUCOSE BLDC GLUCOMTR-MCNC: 257 MG/DL — HIGH (ref 70–99)
GLUCOSE SERPL-MCNC: 157 MG/DL — HIGH (ref 70–99)
HCT VFR BLD CALC: 35.9 % — LOW (ref 39–50)
HCT VFR BLD CALC: 37.9 % — LOW (ref 39–50)
HCT VFR BLD CALC: 38.2 % — LOW (ref 39–50)
HGB BLD-MCNC: 12.5 G/DL — LOW (ref 13–17)
HGB BLD-MCNC: 12.9 G/DL — LOW (ref 13–17)
HGB BLD-MCNC: 13.1 G/DL — SIGNIFICANT CHANGE UP (ref 13–17)
MCHC RBC-ENTMCNC: 30.8 PG — SIGNIFICANT CHANGE UP (ref 27–34)
MCHC RBC-ENTMCNC: 30.9 PG — SIGNIFICANT CHANGE UP (ref 27–34)
MCHC RBC-ENTMCNC: 31 PG — SIGNIFICANT CHANGE UP (ref 27–34)
MCHC RBC-ENTMCNC: 34 GM/DL — SIGNIFICANT CHANGE UP (ref 32–36)
MCHC RBC-ENTMCNC: 34.3 GM/DL — SIGNIFICANT CHANGE UP (ref 32–36)
MCHC RBC-ENTMCNC: 34.8 GM/DL — SIGNIFICANT CHANGE UP (ref 32–36)
MCV RBC AUTO: 89.1 FL — SIGNIFICANT CHANGE UP (ref 80–100)
MCV RBC AUTO: 90.1 FL — SIGNIFICANT CHANGE UP (ref 80–100)
MCV RBC AUTO: 90.5 FL — SIGNIFICANT CHANGE UP (ref 80–100)
NRBC # BLD: 0 /100 WBCS — SIGNIFICANT CHANGE UP (ref 0–0)
PLATELET # BLD AUTO: 317 K/UL — SIGNIFICANT CHANGE UP (ref 150–400)
PLATELET # BLD AUTO: 337 K/UL — SIGNIFICANT CHANGE UP (ref 150–400)
PLATELET # BLD AUTO: 347 K/UL — SIGNIFICANT CHANGE UP (ref 150–400)
POTASSIUM SERPL-MCNC: 4.6 MMOL/L — SIGNIFICANT CHANGE UP (ref 3.5–5.3)
POTASSIUM SERPL-SCNC: 4.6 MMOL/L — SIGNIFICANT CHANGE UP (ref 3.5–5.3)
RBC # BLD: 4.03 M/UL — LOW (ref 4.2–5.8)
RBC # BLD: 4.19 M/UL — LOW (ref 4.2–5.8)
RBC # BLD: 4.24 M/UL — SIGNIFICANT CHANGE UP (ref 4.2–5.8)
RBC # FLD: 13.4 % — SIGNIFICANT CHANGE UP (ref 10.3–14.5)
RBC # FLD: 13.5 % — SIGNIFICANT CHANGE UP (ref 10.3–14.5)
RBC # FLD: 13.6 % — SIGNIFICANT CHANGE UP (ref 10.3–14.5)
SODIUM SERPL-SCNC: 137 MMOL/L — SIGNIFICANT CHANGE UP (ref 135–145)
TROPONIN T, HIGH SENSITIVITY RESULT: 1193 NG/L — HIGH (ref 0–51)
TROPONIN T, HIGH SENSITIVITY RESULT: 1405 NG/L — HIGH (ref 0–51)
TROPONIN T, HIGH SENSITIVITY RESULT: 690 NG/L — HIGH (ref 0–51)
TROPONIN T, HIGH SENSITIVITY RESULT: 993 NG/L — HIGH (ref 0–51)
WBC # BLD: 13.13 K/UL — HIGH (ref 3.8–10.5)
WBC # BLD: 14.39 K/UL — HIGH (ref 3.8–10.5)
WBC # BLD: 16.42 K/UL — HIGH (ref 3.8–10.5)
WBC # FLD AUTO: 13.13 K/UL — HIGH (ref 3.8–10.5)
WBC # FLD AUTO: 14.39 K/UL — HIGH (ref 3.8–10.5)
WBC # FLD AUTO: 16.42 K/UL — HIGH (ref 3.8–10.5)

## 2023-05-28 PROCEDURE — 99223 1ST HOSP IP/OBS HIGH 75: CPT

## 2023-05-28 RX ORDER — SODIUM CHLORIDE 9 MG/ML
1000 INJECTION, SOLUTION INTRAVENOUS
Refills: 0 | Status: DISCONTINUED | OUTPATIENT
Start: 2023-05-28 | End: 2023-06-04

## 2023-05-28 RX ORDER — DEXTROSE 50 % IN WATER 50 %
25 SYRINGE (ML) INTRAVENOUS ONCE
Refills: 0 | Status: DISCONTINUED | OUTPATIENT
Start: 2023-05-28 | End: 2023-06-04

## 2023-05-28 RX ORDER — TICAGRELOR 90 MG/1
90 TABLET ORAL EVERY 12 HOURS
Refills: 0 | Status: DISCONTINUED | OUTPATIENT
Start: 2023-05-28 | End: 2023-06-03

## 2023-05-28 RX ORDER — INSULIN LISPRO 100/ML
VIAL (ML) SUBCUTANEOUS
Refills: 0 | Status: DISCONTINUED | OUTPATIENT
Start: 2023-05-28 | End: 2023-06-04

## 2023-05-28 RX ORDER — ASPIRIN/CALCIUM CARB/MAGNESIUM 324 MG
81 TABLET ORAL DAILY
Refills: 0 | Status: DISCONTINUED | OUTPATIENT
Start: 2023-05-28 | End: 2023-06-04

## 2023-05-28 RX ORDER — HEPARIN SODIUM 5000 [USP'U]/ML
550 INJECTION INTRAVENOUS; SUBCUTANEOUS
Qty: 25000 | Refills: 0 | Status: DISCONTINUED | OUTPATIENT
Start: 2023-05-28 | End: 2023-05-31

## 2023-05-28 RX ORDER — TICAGRELOR 90 MG/1
90 TABLET ORAL ONCE
Refills: 0 | Status: COMPLETED | OUTPATIENT
Start: 2023-05-28 | End: 2023-05-28

## 2023-05-28 RX ORDER — DEXTROSE 50 % IN WATER 50 %
15 SYRINGE (ML) INTRAVENOUS ONCE
Refills: 0 | Status: DISCONTINUED | OUTPATIENT
Start: 2023-05-28 | End: 2023-06-04

## 2023-05-28 RX ORDER — HEPARIN SODIUM 5000 [USP'U]/ML
3800 INJECTION INTRAVENOUS; SUBCUTANEOUS EVERY 6 HOURS
Refills: 0 | Status: DISCONTINUED | OUTPATIENT
Start: 2023-05-28 | End: 2023-05-31

## 2023-05-28 RX ORDER — METOPROLOL TARTRATE 50 MG
50 TABLET ORAL EVERY 12 HOURS
Refills: 0 | Status: DISCONTINUED | OUTPATIENT
Start: 2023-05-28 | End: 2023-06-04

## 2023-05-28 RX ORDER — HEPARIN SODIUM 5000 [USP'U]/ML
INJECTION INTRAVENOUS; SUBCUTANEOUS
Qty: 25000 | Refills: 0 | Status: DISCONTINUED | OUTPATIENT
Start: 2023-05-28 | End: 2023-05-28

## 2023-05-28 RX ORDER — GLUCAGON INJECTION, SOLUTION 0.5 MG/.1ML
1 INJECTION, SOLUTION SUBCUTANEOUS ONCE
Refills: 0 | Status: DISCONTINUED | OUTPATIENT
Start: 2023-05-28 | End: 2023-06-04

## 2023-05-28 RX ORDER — ACETAMINOPHEN 500 MG
650 TABLET ORAL EVERY 6 HOURS
Refills: 0 | Status: DISCONTINUED | OUTPATIENT
Start: 2023-05-28 | End: 2023-06-04

## 2023-05-28 RX ORDER — ATORVASTATIN CALCIUM 80 MG/1
80 TABLET, FILM COATED ORAL AT BEDTIME
Refills: 0 | Status: DISCONTINUED | OUTPATIENT
Start: 2023-05-28 | End: 2023-06-04

## 2023-05-28 RX ORDER — HEPARIN SODIUM 5000 [USP'U]/ML
3800 INJECTION INTRAVENOUS; SUBCUTANEOUS ONCE
Refills: 0 | Status: COMPLETED | OUTPATIENT
Start: 2023-05-28 | End: 2023-05-28

## 2023-05-28 RX ORDER — INSULIN GLARGINE 100 [IU]/ML
20 INJECTION, SOLUTION SUBCUTANEOUS AT BEDTIME
Refills: 0 | Status: DISCONTINUED | OUTPATIENT
Start: 2023-05-28 | End: 2023-06-04

## 2023-05-28 RX ORDER — HEPARIN SODIUM 5000 [USP'U]/ML
3800 INJECTION INTRAVENOUS; SUBCUTANEOUS EVERY 6 HOURS
Refills: 0 | Status: DISCONTINUED | OUTPATIENT
Start: 2023-05-28 | End: 2023-05-28

## 2023-05-28 RX ORDER — LANOLIN ALCOHOL/MO/W.PET/CERES
3 CREAM (GRAM) TOPICAL AT BEDTIME
Refills: 0 | Status: DISCONTINUED | OUTPATIENT
Start: 2023-05-28 | End: 2023-06-04

## 2023-05-28 RX ORDER — ONDANSETRON 8 MG/1
4 TABLET, FILM COATED ORAL EVERY 8 HOURS
Refills: 0 | Status: DISCONTINUED | OUTPATIENT
Start: 2023-05-28 | End: 2023-06-04

## 2023-05-28 RX ORDER — DEXTROSE 50 % IN WATER 50 %
12.5 SYRINGE (ML) INTRAVENOUS ONCE
Refills: 0 | Status: DISCONTINUED | OUTPATIENT
Start: 2023-05-28 | End: 2023-06-04

## 2023-05-28 RX ORDER — INSULIN LISPRO 100/ML
VIAL (ML) SUBCUTANEOUS AT BEDTIME
Refills: 0 | Status: DISCONTINUED | OUTPATIENT
Start: 2023-05-28 | End: 2023-06-04

## 2023-05-28 RX ADMIN — HEPARIN SODIUM 550 UNIT(S)/HR: 5000 INJECTION INTRAVENOUS; SUBCUTANEOUS at 19:46

## 2023-05-28 RX ADMIN — INSULIN GLARGINE 20 UNIT(S): 100 INJECTION, SOLUTION SUBCUTANEOUS at 21:26

## 2023-05-28 RX ADMIN — Medication 81 MILLIGRAM(S): at 10:32

## 2023-05-28 RX ADMIN — HEPARIN SODIUM 550 UNIT(S)/HR: 5000 INJECTION INTRAVENOUS; SUBCUTANEOUS at 10:44

## 2023-05-28 RX ADMIN — HEPARIN SODIUM 3800 UNIT(S): 5000 INJECTION INTRAVENOUS; SUBCUTANEOUS at 02:29

## 2023-05-28 RX ADMIN — HEPARIN SODIUM 550 UNIT(S)/HR: 5000 INJECTION INTRAVENOUS; SUBCUTANEOUS at 18:29

## 2023-05-28 RX ADMIN — HEPARIN SODIUM 750 UNIT(S)/HR: 5000 INJECTION INTRAVENOUS; SUBCUTANEOUS at 08:21

## 2023-05-28 RX ADMIN — ATORVASTATIN CALCIUM 80 MILLIGRAM(S): 80 TABLET, FILM COATED ORAL at 23:17

## 2023-05-28 RX ADMIN — Medication 1: at 21:26

## 2023-05-28 RX ADMIN — TICAGRELOR 90 MILLIGRAM(S): 90 TABLET ORAL at 02:30

## 2023-05-28 RX ADMIN — Medication 2: at 19:47

## 2023-05-28 RX ADMIN — TICAGRELOR 90 MILLIGRAM(S): 90 TABLET ORAL at 17:56

## 2023-05-28 RX ADMIN — HEPARIN SODIUM 550 UNIT(S)/HR: 5000 INJECTION INTRAVENOUS; SUBCUTANEOUS at 17:39

## 2023-05-28 RX ADMIN — HEPARIN SODIUM 750 UNIT(S)/HR: 5000 INJECTION INTRAVENOUS; SUBCUTANEOUS at 02:30

## 2023-05-28 RX ADMIN — Medication 50 MILLIGRAM(S): at 17:56

## 2023-05-28 NOTE — H&P ADULT - NSHPLABSRESULTS_GEN_ALL_CORE
LABS:                         12.9   14.39 )-----------( 337      ( 28 May 2023 08:30 )             37.9     05-28    137  |  100  |  23  ----------------------------<  157<H>  4.6   |  21<L>  |  1.32<H>    Ca    9.1      28 May 2023 06:06  Mg     2.3     05-27    TPro  7.3  /  Alb  4.1  /  TBili  3.2<H>  /  DBili  x   /  AST  33  /  ALT  18  /  AlkPhos  149<H>  05-27    PT/INR - ( 27 May 2023 20:59 )   PT: 48.0 sec;   INR: 4.08 ratio         PTT - ( 28 May 2023 08:30 )  PTT:101.8 sec    CARDIAC MARKERS ( 28 May 2023 06:06 )  x     / x     / 557 U/L / x     / 81.6 ng/mL      Records reviewed from prior hospitalization - 5/3/23 - adm for acute decomp chf  EKG personally reviewed afib rvr hr 116, inferolateral lead ST depressions

## 2023-05-28 NOTE — ED ADULT NURSE REASSESSMENT NOTE - NS ED NURSE REASSESS COMMENT FT1
0927 Heparin paused per ACS nomogram, repeat Coags, verified with Tory WELLS, Cardiology at bedside and aware of repeat troponin. 0927 Heparin paused per ACS nomogram and repeat Coags, verified with Tory WELLS, Cardiology at bedside and aware of repeat troponin. Repeat EKG completed per duke REYES 0927 Heparin paused per ACS nomogram and repeat Coags, verified with Tory RN, Cardiology at bedside and aware of repeat troponin. Repeat EKG completed per duke MD, no further RN interventions needed at  this time

## 2023-05-28 NOTE — H&P ADULT - PROBLEM SELECTOR PLAN 4
- RUL GGO opacity ~2 cm in size ---> pt will need f/u CT in 6 weeks to monitor  - SMA stenosis noted - however without symptoms, continue to monitor, c/w asa/brillinta/lipitor

## 2023-05-28 NOTE — H&P ADULT - PROBLEM SELECTOR PLAN 2
on insulin at home  - last admission, was on lantus 22 - start 20 u bedtime  - monitor fs tidacbed, c/w iss

## 2023-05-28 NOTE — PATIENT PROFILE ADULT - FALL HARM RISK - RISK INTERVENTIONS

## 2023-05-28 NOTE — ED ADULT NURSE NOTE - NSFALLUNIVINTERV_ED_ALL_ED
Bed/Stretcher in lowest position, wheels locked, appropriate side rails in place/Call bell, personal items and telephone in reach/Instruct patient to call for assistance before getting out of bed/chair/stretcher/Non-slip footwear applied when patient is off stretcher/[place] to call system/Physically safe environment - no spills, clutter or unnecessary equipment/Purposeful proactive rounding/Room/bathroom lighting operational, light cord in reach

## 2023-05-28 NOTE — H&P ADULT - NSHPPHYSICALEXAM_GEN_ALL_CORE
Vital Signs Last 24 Hrs  T(C): 36.9 (28 May 2023 11:20), Max: 36.9 (28 May 2023 11:20)  T(F): 98.4 (28 May 2023 11:20), Max: 98.4 (28 May 2023 11:20)  HR: 110 (28 May 2023 11:20) (104 - 165)  BP: 141/78 (28 May 2023 11:20) (126/97 - 165/105)  BP(mean): --  RR: 19 (28 May 2023 11:20) (18 - 21)  SpO2: 100% (28 May 2023 11:20) (98% - 100%)    Parameters below as of 28 May 2023 11:20  Patient On (Oxygen Delivery Method): room air        PHYSICAL EXAM:  GENERAL:  Well appearing, in NAD  HEAD:  NCAT  EYES: conjunctiva clear  NECK: Supple, No JVD  CHEST/LUNG: CTA B/L. No w/r/r.  HEART: tachycardic, irreg rhythm  Normal S1, S2. No m/r/g.   ABDOMEN: SNTND  EXTREMITIES:  2+ Peripheral Pulses, No clubbing, cyanosis, edema.  PSYCH: AAOx3, appropriate affect  SKIN: No rashes or lesions

## 2023-05-28 NOTE — PROGRESS NOTE ADULT - SUBJECTIVE AND OBJECTIVE BOX
Case discussed with Dr Mahnaz Willis - Cath attending who believes that there is no indication for emergent angiography  Discussed with Dr DUNCAN Shaw - no CCU beds available.

## 2023-05-28 NOTE — ED ADULT NURSE REASSESSMENT NOTE - NS ED NURSE REASSESS COMMENT FT1
NP kenny ([id number]) aware of AM lab work, no RN intervention needed at this time, pt has no complaints. NP kenny ([id number]) aware of repeat troponin, no RN intervention needed at this time, pt has no complaints. NP kenny ([id number]) aware of repeat troponin, no RN intervention needed at this time, pt denies cp, sob, palpitations, nausea, numbness/tingling at this time

## 2023-05-28 NOTE — H&P ADULT - PROBLEM SELECTOR PLAN 1
rising troponin, last LHC 3/2023 with tight distal lesion in a small codominant RCA medically managing  - seen by cardiology Dr Rivas - f/u re: timing of LHC  - hep gtt, monitor PTT  - metoprolol 50 mg bid, brillinta, asa, lipitor 80  - holding home lasix rising troponin, last LHC 3/2023 with tight distal lesion in a small codominant RCA medically managing  - seen by cardiology Dr Rivas - f/u re: timing of LHC  - hep gtt, monitor PTT  - metoprolol 50 mg bid, brillinta, asa, lipitor 80  - holding home lasix  - leukocytosis/meets sepsis criteria however no obvious infection. monitor hr/temp, and lactate rising troponin, last LHC 3/2023 with tight distal lesion in a small codominant RCA medically managing  - seen by cardiology Dr Rivas - f/u re: timing of LHC  - trend troponin until peaks  - hep gtt, monitor PTT  - metoprolol 50 mg bid, brillinta, asa, lipitor 80  - holding home lasix  - leukocytosis/meets sepsis criteria however no obvious infection. monitor hr/temp, and lactate

## 2023-05-28 NOTE — H&P ADULT - MLM HIDDEN
Follow up in 2-3 weeks    Parent is free to call office as well anytime for ANY urgent/non-urgent concern or needs.  Please use [phone] from 8-5pm Monday-Friday.     After hours:  For emergencies AFTER HOURS/WEEKENDS call [phone] (general urology line) and press option 3 for DOCTOR on CALL for our urology resident on call.     DO NOT press the option for the general nurse.    POST OP RULES    1. Use prescription pain medication only as directed for severe pain. Ok to use pediatric acetaminophen(tylenol) and then after 48 hours can add pediatric motrin or advil (ibuprofen) for pain. Ok to buy generic brands.      2. No straddle toys (walkers, bouncers, playground eqip) /No sports/strenuous activity/swimming until cleared by doctor. Car seats and strollers are ok to use as long as rolled up diaper or towel is placed in between groin and strap.    3. AFTERCARE: Try initially not to remove dressing- it will fall off with bathing. No bath/shower for 24 hours. If dressing hasn't fallen off yet, at time of bathing, soak in warm water and typically can gently remove. If will not come off, don't worry- should come off shortly or with next bath.    Once dressing is off (whether falls off early or in bath), apply vaseline or aquafor  to penis with every diaper change. If toilet trained, apply vaseline every few hours. (sometimes using a pullup is helpful for toilet trained children for vaseline and aftercare)    Bath daily with soap and water once bathing restarts.     4. Penis may have yellow/white discharge that is typically normal during healing process which can take 3-4 weeks. If any doubt or questions, Please call MD anytime.      yes

## 2023-05-28 NOTE — ED ADULT NURSE NOTE - CAS TRG GEN SKIN CONDITION
Pt has been taking AZO but still has the burning sensation at the end of the day during  When the medication wears off the burning comes back  Pt would like to know if antibiotic could be prescribed  Warm/Dry

## 2023-05-28 NOTE — H&P ADULT - ASSESSMENT
69 yo M PMH pafib, CAD multiple PCIs w/ recent TITO 3/7/23 to LCx, CKD3, IDDM2 p/w L CP adm with NSTEMI w/ course c/b afib RVR.

## 2023-05-28 NOTE — ED ADULT NURSE REASSESSMENT NOTE - NS ED NURSE REASSESS COMMENT FT1
Weight obtained from patient utilizing [ standing] which had been zeroed prior to measurement. Pt educated on risks and benefits of heparin drip, pt verbalized understanding. Heparin medication verified with heparin nomogram protocol. Two peripheral IV access devices obtained prior to IV heparin initiated. Heparin IV bolus and IV drip initiated for patient following heparin nomogram for /ACS/due to  abnormal heart elevated cardiac troponin blood tests

## 2023-05-28 NOTE — ED ADULT NURSE NOTE - OBJECTIVE STATEMENT
69 YO male with PMH  HTN, afib  via EMS in presenting with complaints of  CP. pt reports sudden onset CP after eating ravioli radiating to back. pt denies n/v, SOb. pt found to be hypertensive to the 170's systolic and given 1 dose of nitroglycerin by EMS.  Pt Axox4, gross neuro intact, PERRL mm. respirations even, & non-labored Abdomen soft, non-tender, non-distended. Skin warm, dry, and intact. Pt placed in position of comfort. Pt educated on call bell system and provided call bell. Bed in lowest position, wheels locked, appropriate side rails raised. Pt denies needs at this time. on cardiac monitor  in rapid afib.

## 2023-05-28 NOTE — PROVIDER CONTACT NOTE (OTHER) - ASSESSMENT
Pt alert and oriented x 4. VSS. Denies Cp/SOB/palpitations. Hep gtt at 5.5ml being infused, therapeutic x1. Found to be Afib RVR with HR up to 160's. Plan cath in am

## 2023-05-28 NOTE — H&P ADULT - HISTORY OF PRESENT ILLNESS
69 yo M PMH pafib, CAD multiple PCIs w/ recent TITO 3/7/23 to LCx, CKD3 IDDM2 p/w L CP. States he began experiencing L CP day prior to admission while eating, worse with exertion, radiating to back, associated w some SOB. No diaphoresis, n/v, abd pain, f/chills. Says this was similar to prior CP episodes requiring LHC. Denies infectious complaints including diarrhea, dysuria, rash.    In the ED VS noted - afebrile, HR 120s afib  Meds received - hep gtt, brillinta, ppi iv, metoprolol iv, labetalol 10 mg iv

## 2023-05-28 NOTE — ED ADULT NURSE NOTE - NS PRO PASSIVE SMOKE EXP
Patient ID: Liz Martin is a 70 y.o. male    Chief Complaint: Hypercalcemia   Here with daughter, Psych nurse     HPI:   Liz Martin is here for initial evaluation of hypercalcemia/hyperparathyroidism. Ca 15.2 in Jan 2018, PTH -rp <2 in Jan 2018. 2.9 Nov 2017 (slightly high) with HCTZ and Vit D. . PTH normal Nov 2017. It started with renal azotemia and calcium has improved and hypercalcemia has resolved   Last 10.1 Marc h 2018   TSH, FT4 normal in Dec 2017  US kidney normal Nov 2017     Kidney stone 20 years ago     Has B cell lymphoma   Will see oncologist     Increased thirst   1 PPD for more than six decades     No fractures   Cousins have osteoporosis     No family history of thyroid, parathyroid, pituitary or adrenal tumors. No known family history of hypercalcemia or kidney stones. MVT once a day   No tums     Low libido   Two daughters       The following portions of the patient's history were reviewed and updated as appropriate:     Family History   Problem Relation Age of Onset    Cancer Mother      breast, liposarcoma, angiosarcoma, NHL     Heart Failure Father     COPD Father     High Blood Pressure Neg Hx     High Cholesterol Neg Hx     Heart Disease Neg Hx          Social History     Social History    Marital status:       Spouse name: N/A    Number of children: N/A    Years of education: N/A     Occupational History    retired inventor      Social History Main Topics    Smoking status: Current Every Day Smoker     Packs/day: 1.00     Years: 60.00     Types: Cigarettes    Smokeless tobacco: Never Used      Comment: quit for a couple of years then started again after experiencing bad dreams and started/ smoking a pack a day    Alcohol use No    Drug use: Yes     Frequency: 7.0 times per week     Types: Marijuana      Comment: hasn't smoke since July 2014     Sexual activity: Not Currently     Other Topics Concern    Not on file     Social History Narrative    No narrative on file         Past Medical History:   Diagnosis Date    Acute MI     x 8 per pt report    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 [address]     Cerebral artery occlusion with cerebral infarction ([facility] [address]     CHF (congestive heart failure) ([facility] [address]     COPD (chronic obstructive pulmonary disease) ([facility] [address]     Hyperlipidemia     Hypertension     Influenza 01/11/2018    Kidney stone     20 years     Neuromuscular disorder ([facility] [address]     Pneumonia     Psychiatric problem     PVD (peripheral vascular disease) ([facility] [address]     Symptomatic bradycardia          Past Surgical History:   Procedure Laterality Date    BRAIN SURGERY  1998    aneurysm repair    CORONARY ANGIOPLASTY WITH STENT PLACEMENT      x 1    CYSTOSCOPY      to remove stone    KNEE ARTHROSCOPY Right 1997    LITHOTRIPSY      PACEMAKER INSERTION  8/18/15    dual chamber    TONSILLECTOMY      TOTAL KNEE ARTHROPLASTY Right 04/04/2017    VASCULAR SURGERY      2 stents in right iliac artery; 1 stent in left iliac artery; and one other in another vessel    VASCULAR SURGERY      STENTS FEMORAL        No Known Allergies      Current Outpatient Prescriptions:     furosemide (LASIX) 80 MG tablet, Take 1 tablet by mouth 2 times daily, Disp: 180 tablet, Rfl: 1    omeprazole (PRILOSEC) 20 MG delayed release capsule, Take 20 mg by mouth 2 times daily , Disp: , Rfl:     apixaban (ELIQUIS) 5 MG TABS tablet, Take 1 tablet by mouth 2 times daily, Disp: 60 tablet, Rfl: 3    tamsulosin (FLOMAX) 0.4 MG capsule, Take 1 capsule by mouth nightly, Disp: 30 capsule, Rfl: 3    nystatin (MYCOSTATIN) 935480 UNIT/GM powder, Apply 3 times daily. , Disp: 1 Bottle, Rfl: 0    carvedilol (COREG) 12.5 MG tablet, TAKE 1 TABLET BY MOUTH TWO TIMES A DAY, Disp: 180 tablet, Rfl: 3    isosorbide mononitrate (IMDUR) 30 MG extended release tablet, Take 1 tablet by mouth daily, Disp: 90 tablet, Rfl: range of motion. Neurological: Patient is alert and oriented to person, place, and time. Patient has normal reflexes. Skin: Skin is warm and dry. Psychiatric: Patient has a normal mood and affect.  Patient behavior is normal.     Lab Review:    Recent Results (from the past 1008 hour(s))   Basic Metabolic Panel    Collection Time: 02/15/18  2:00 PM   Result Value Ref Range    Sodium 137 136 - 145 mmol/L    Potassium 3.7 3.5 - 5.1 mmol/L    Chloride 97 (L) 99 - 110 mmol/L    CO2 25 21 - 32 mmol/L    Anion Gap 15 3 - 16    Glucose 90 70 - 99 mg/dL    BUN 28 (H) 7 - 20 mg/dL    CREATININE 2.5 (H) 0.8 - 1.3 mg/dL    GFR Non-African American 25 (A) >60    GFR  31 (A) >60    Calcium 11.1 (H) 8.3 - 10.6 mg/dL   Brain Natriuretic Peptide    Collection Time: 02/15/18  2:00 PM   Result Value Ref Range    Pro-BNP 4,573 (H) 0 - 449 pg/mL   Calcium, Ionized    Collection Time: 02/15/18  2:00 PM   Result Value Ref Range    Calcium, Ion 1.32 1.12 - 1.32 mmol/L    pH, Hari 7.487 (H) 7.350 - 7.450   CBC Auto Differential    Collection Time: 02/15/18  2:00 PM   Result Value Ref Range    WBC 5.6 4.0 - 11.0 K/uL    RBC 2.82 (L) 4.20 - 5.90 M/uL    Hemoglobin 8.2 (L) 13.5 - 17.5 g/dL    Hematocrit 25.4 (L) 40.5 - 52.5 %    MCV 90.1 80.0 - 100.0 fL    MCH 29.0 26.0 - 34.0 pg    MCHC 32.2 31.0 - 36.0 g/dL    RDW 18.5 (H) 12.4 - 15.4 %    Platelets 686 343 - 441 K/uL    MPV 8.7 5.0 - 10.5 fL    Neutrophils % 70.6 %    Lymphocytes % 11.4 %    Monocytes % 13.7 %    Eosinophils % 3.8 %    Basophils % 0.5 %    Neutrophils # 3.9 1.7 - 7.7 K/uL    Lymphocytes # 0.6 (L) 1.0 - 5.1 K/uL    Monocytes # 0.8 0.0 - 1.3 K/uL    Eosinophils # 0.2 0.0 - 0.6 K/uL    Basophils # 0.0 0.0 - 0.2 K/uL   Brain Natriuretic Peptide    Collection Time: 03/07/18 10:02 AM   Result Value Ref Range    Pro-BNP 5,447 (H) 0 - 449 pg/mL   Basic metabolic panel    Collection Time: 03/07/18 10:02 AM   Result Value Ref Range    Sodium 141 136 - 145 mmol/L No

## 2023-05-28 NOTE — CONSULT NOTE ADULT - SUBJECTIVE AND OBJECTIVE BOX
68 year old male with chronic AF S/P LAD stent and a recent Cx stent on 3/7/23 now presents with an episode of chest tightness at 5:00 PM yesterday evening.  His symptoms resolved at approximately 8:00 PM after receiving SL NTG and IV Heparin in the ED.  He has had no further C/P since that time.  This AM he is pain free and denies dyspnea.  The patient was seen by the Cath team at 4:00 AM and the decision was made not to do an emergent LHC.  He was given Labetalol 10 mg IV   Metoprolol 5 mg  5 mg and 10 mg as well as Brilinta 90 mg PO and IV Protonix    PMH:  HTN, HLD  HFpEF    Out patient meds:  Plavix 75 mg qd                              Xarelto 20 mg qd                              Lasix 40 mg qd                              Atorvastatin 80 mg qd                              Zetia 10 mg qd                              Diltiazem  mg qd                              Metoprolol ER 50 mg qd                              Lasix 40 mg qd    Meds:  IV Heparin    /82   AF  O2 Sat 100% on RA  Lungs clear  Irregular rhythm 1/6 systolic murmur  No edema    BUN 23  Crt 1.32  Troponin  76   690   1193    WBC 14.39  Hgb 12.9  Hct 37.9  Plt 337K    EKG # 1 AF with 2-3 mm ST depression in II, V4-V5  EKG# 2  AF with deeper ST depression in leads II, III, AvF V4-V6    Most recent Echo 5/523  EF 60%  no focal wall motion abnormalities   no valvular HD    Imp:  S/P LAD stent and recent Cx stent who now presents with a NSTEMI.  He is pain free and hemodynamically stable but his Troponin is rising  Rec:  CCU admission   ASA 81 mg qd  Continue IV Heparin and Brilinta   OK to hold Plavix  Hold Xarelto   Atorvastatin 80 mg qd   Metoprolol 50 mg PO bid     Hold Lasix and Diltiazem    Repeat Echo   Serial CPK and Troponin  Patient will require repeat coronary angiography  Will discuss with the interventional team re: timing 68 year old male with chronic AF S/P LAD stent and a recent Cx stent on 3/7/23 now presents with an episode of chest tightness at 5:00 PM yesterday evening.  His symptoms resolved at approximately 8:00 PM after receiving SL NTG and IV Heparin in the ED.  He has had no further C/P since that time.  This AM he is pain free and denies dyspnea.  The patient was seen by the Cath team at 4:00 AM and the decision was made not to do an emergent LHC.  He was given Labetalol 10 mg IV   Metoprolol 5 mg  5 mg and 10 mg as well as Brilinta 90 mg PO and IV Protonix    PMH:  HTN, HLD  HFpEF    Out patient meds:  Plavix 75 mg qd                              Xarelto 20 mg qd                              Lasix 40 mg qd                              Atorvastatin 80 mg qd                              Zetia 10 mg qd                              Diltiazem  mg qd                              Metoprolol ER 50 mg qd                              Lasix 40 mg qd    Meds:  IV Heparin    /82   AF  O2 Sat 100% on RA  Lungs clear  Irregular rhythm 1/6 systolic murmur  No edema    BUN 23  Crt 1.32  Troponin  76   690   1193    WBC 14.39  Hgb 12.9  Hct 37.9  Plt 337K    EKG # 1 AF with 2-3 mm ST depression in II, V4-V5  EKG# 2  AF with deeper ST depression in leads II, III, AvF V4-V6    Most recent Echo 5/523  EF 60%  no focal wall motion abnormalities   no valvular HD    Imp:  S/P LAD stent and recent Cx stent who now presents with a NSTEMI.  He is pain free and hemodynamically stable but his Troponin is rising  Rec:  CCU admission   ASA 81 mg qd  Continue IV Heparin and Brilinta   OK to hold Plavix  Hold Xarelto   Atorvastatin 80 mg qd   Metoprolol 50 mg PO bid     Hold Lasix and Diltiazem    Repeat Echo   Serial CPK and Troponin  Patient will require repeat coronary angiography  Will discuss with the interventional team re: timing  Case discussed with Dr Mcdaniel

## 2023-05-28 NOTE — H&P ADULT - NSHPREVIEWOFSYSTEMS_GEN_ALL_CORE
Review of Systems:   CONSTITUTIONAL: No fever, weight loss  EYES: No eye pain, visual disturbances, or discharge  ENMT:  No difficulty hearing, tinnitus, vertigo; No sinus or throat pain  RESPIRATORY: No SOB. No cough, wheezing, chills or hemoptysis  CARDIOVASCULAR: +cp. No palpitations, dizziness, or leg swelling  GASTROINTESTINAL: No abdominal or epigastric pain. No nausea, vomiting, or hematemesis; No diarrhea or constipation. No melena or hematochezia.  GENITOURINARY: No dysuria, frequency, hematuria, or incontinence  NEUROLOGICAL: No headaches, memory loss, loss of strength, numbness, or tremors  SKIN: No itching, burning, rashes, or lesions   ENDOCRINE: No heat or cold intolerance; No hair loss  MUSCULOSKELETAL: No joint pain or swelling; No muscle, back pain  PSYCHIATRIC: No depression, anxiety, mood swings, or difficulty sleeping  HEME/LYMPH: No easy bruising, or bleeding gums

## 2023-05-29 LAB
ALBUMIN SERPL ELPH-MCNC: 3.8 G/DL — SIGNIFICANT CHANGE UP (ref 3.3–5)
ALP SERPL-CCNC: 119 U/L — SIGNIFICANT CHANGE UP (ref 40–120)
ALT FLD-CCNC: 18 U/L — SIGNIFICANT CHANGE UP (ref 10–45)
ANION GAP SERPL CALC-SCNC: 18 MMOL/L — HIGH (ref 5–17)
APPEARANCE UR: CLEAR — SIGNIFICANT CHANGE UP
APTT BLD: 53.2 SEC — HIGH (ref 27.5–35.5)
AST SERPL-CCNC: 54 U/L — HIGH (ref 10–40)
BACTERIA # UR AUTO: NEGATIVE — SIGNIFICANT CHANGE UP
BASOPHILS # BLD AUTO: 0.06 K/UL — SIGNIFICANT CHANGE UP (ref 0–0.2)
BASOPHILS NFR BLD AUTO: 0.5 % — SIGNIFICANT CHANGE UP (ref 0–2)
BILIRUB SERPL-MCNC: 2 MG/DL — HIGH (ref 0.2–1.2)
BILIRUB UR-MCNC: NEGATIVE — SIGNIFICANT CHANGE UP
BUN SERPL-MCNC: 29 MG/DL — HIGH (ref 7–23)
CALCIUM SERPL-MCNC: 9.2 MG/DL — SIGNIFICANT CHANGE UP (ref 8.4–10.5)
CHLORIDE SERPL-SCNC: 101 MMOL/L — SIGNIFICANT CHANGE UP (ref 96–108)
CHOLEST SERPL-MCNC: 104 MG/DL — SIGNIFICANT CHANGE UP
CO2 SERPL-SCNC: 20 MMOL/L — LOW (ref 22–31)
COLOR SPEC: YELLOW — SIGNIFICANT CHANGE UP
CREAT ?TM UR-MCNC: 108 MG/DL — SIGNIFICANT CHANGE UP
CREAT SERPL-MCNC: 1.54 MG/DL — HIGH (ref 0.5–1.3)
DIFF PNL FLD: NEGATIVE — SIGNIFICANT CHANGE UP
EGFR: 49 ML/MIN/1.73M2 — LOW
EOSINOPHIL # BLD AUTO: 0.58 K/UL — HIGH (ref 0–0.5)
EOSINOPHIL NFR BLD AUTO: 4.6 % — SIGNIFICANT CHANGE UP (ref 0–6)
EPI CELLS # UR: 0 /HPF — SIGNIFICANT CHANGE UP
GLUCOSE BLDC GLUCOMTR-MCNC: 113 MG/DL — HIGH (ref 70–99)
GLUCOSE BLDC GLUCOMTR-MCNC: 162 MG/DL — HIGH (ref 70–99)
GLUCOSE BLDC GLUCOMTR-MCNC: 171 MG/DL — HIGH (ref 70–99)
GLUCOSE BLDC GLUCOMTR-MCNC: 320 MG/DL — HIGH (ref 70–99)
GLUCOSE SERPL-MCNC: 103 MG/DL — HIGH (ref 70–99)
GLUCOSE UR QL: ABNORMAL
HCT VFR BLD CALC: 38.3 % — LOW (ref 39–50)
HDLC SERPL-MCNC: 38 MG/DL — LOW
HGB BLD-MCNC: 12.9 G/DL — LOW (ref 13–17)
HYALINE CASTS # UR AUTO: 1 /LPF — SIGNIFICANT CHANGE UP (ref 0–2)
IMM GRANULOCYTES NFR BLD AUTO: 0.5 % — SIGNIFICANT CHANGE UP (ref 0–0.9)
KETONES UR-MCNC: NEGATIVE — SIGNIFICANT CHANGE UP
LEUKOCYTE ESTERASE UR-ACNC: NEGATIVE — SIGNIFICANT CHANGE UP
LIPID PNL WITH DIRECT LDL SERPL: 45 MG/DL — SIGNIFICANT CHANGE UP
LYMPHOCYTES # BLD AUTO: 1.66 K/UL — SIGNIFICANT CHANGE UP (ref 1–3.3)
LYMPHOCYTES # BLD AUTO: 13.1 % — SIGNIFICANT CHANGE UP (ref 13–44)
MCHC RBC-ENTMCNC: 30.8 PG — SIGNIFICANT CHANGE UP (ref 27–34)
MCHC RBC-ENTMCNC: 33.7 GM/DL — SIGNIFICANT CHANGE UP (ref 32–36)
MCV RBC AUTO: 91.4 FL — SIGNIFICANT CHANGE UP (ref 80–100)
MONOCYTES # BLD AUTO: 0.95 K/UL — HIGH (ref 0–0.9)
MONOCYTES NFR BLD AUTO: 7.5 % — SIGNIFICANT CHANGE UP (ref 2–14)
NEUTROPHILS # BLD AUTO: 9.35 K/UL — HIGH (ref 1.8–7.4)
NEUTROPHILS NFR BLD AUTO: 73.8 % — SIGNIFICANT CHANGE UP (ref 43–77)
NITRITE UR-MCNC: NEGATIVE — SIGNIFICANT CHANGE UP
NON HDL CHOLESTEROL: 66 MG/DL — SIGNIFICANT CHANGE UP
NRBC # BLD: 0 /100 WBCS — SIGNIFICANT CHANGE UP (ref 0–0)
PH UR: 6 — SIGNIFICANT CHANGE UP (ref 5–8)
PLATELET # BLD AUTO: 320 K/UL — SIGNIFICANT CHANGE UP (ref 150–400)
POTASSIUM SERPL-MCNC: 3.7 MMOL/L — SIGNIFICANT CHANGE UP (ref 3.5–5.3)
POTASSIUM SERPL-SCNC: 3.7 MMOL/L — SIGNIFICANT CHANGE UP (ref 3.5–5.3)
PROT ?TM UR-MCNC: 17 MG/DL — HIGH (ref 0–12)
PROT SERPL-MCNC: 6.4 G/DL — SIGNIFICANT CHANGE UP (ref 6–8.3)
PROT UR-MCNC: ABNORMAL
PROT/CREAT UR-RTO: 0.2 RATIO — SIGNIFICANT CHANGE UP (ref 0–0.2)
RBC # BLD: 4.19 M/UL — LOW (ref 4.2–5.8)
RBC # FLD: 13.4 % — SIGNIFICANT CHANGE UP (ref 10.3–14.5)
RBC CASTS # UR COMP ASSIST: 1 /HPF — SIGNIFICANT CHANGE UP (ref 0–4)
SODIUM SERPL-SCNC: 139 MMOL/L — SIGNIFICANT CHANGE UP (ref 135–145)
SODIUM UR-SCNC: 26 MMOL/L — SIGNIFICANT CHANGE UP
SP GR SPEC: 1.03 — HIGH (ref 1.01–1.02)
TRIGL SERPL-MCNC: 105 MG/DL — SIGNIFICANT CHANGE UP
TROPONIN T, HIGH SENSITIVITY RESULT: 748 NG/L — HIGH (ref 0–51)
TROPONIN T, HIGH SENSITIVITY RESULT: 765 NG/L — HIGH (ref 0–51)
TROPONIN T, HIGH SENSITIVITY RESULT: 770 NG/L — HIGH (ref 0–51)
UROBILINOGEN FLD QL: ABNORMAL
WBC # BLD: 12.66 K/UL — HIGH (ref 3.8–10.5)
WBC # FLD AUTO: 12.66 K/UL — HIGH (ref 3.8–10.5)
WBC UR QL: 2 /HPF — SIGNIFICANT CHANGE UP (ref 0–5)

## 2023-05-29 RX ORDER — CHLORHEXIDINE GLUCONATE 213 G/1000ML
1 SOLUTION TOPICAL DAILY
Refills: 0 | Status: DISCONTINUED | OUTPATIENT
Start: 2023-05-29 | End: 2023-06-04

## 2023-05-29 RX ORDER — SODIUM BICARBONATE 1 MEQ/ML
0.06 SYRINGE (ML) INTRAVENOUS
Qty: 75 | Refills: 0 | Status: DISCONTINUED | OUTPATIENT
Start: 2023-05-29 | End: 2023-06-04

## 2023-05-29 RX ADMIN — HEPARIN SODIUM 550 UNIT(S)/HR: 5000 INJECTION INTRAVENOUS; SUBCUTANEOUS at 07:06

## 2023-05-29 RX ADMIN — Medication 50 MEQ/KG/HR: at 16:20

## 2023-05-29 RX ADMIN — Medication 50 MILLIGRAM(S): at 17:45

## 2023-05-29 RX ADMIN — HEPARIN SODIUM 550 UNIT(S)/HR: 5000 INJECTION INTRAVENOUS; SUBCUTANEOUS at 00:42

## 2023-05-29 RX ADMIN — CHLORHEXIDINE GLUCONATE 1 APPLICATION(S): 213 SOLUTION TOPICAL at 11:48

## 2023-05-29 RX ADMIN — ATORVASTATIN CALCIUM 80 MILLIGRAM(S): 80 TABLET, FILM COATED ORAL at 21:16

## 2023-05-29 RX ADMIN — TICAGRELOR 90 MILLIGRAM(S): 90 TABLET ORAL at 15:33

## 2023-05-29 RX ADMIN — Medication 50 MILLIGRAM(S): at 05:21

## 2023-05-29 RX ADMIN — Medication 81 MILLIGRAM(S): at 11:48

## 2023-05-29 RX ADMIN — TICAGRELOR 90 MILLIGRAM(S): 90 TABLET ORAL at 03:21

## 2023-05-29 RX ADMIN — INSULIN GLARGINE 20 UNIT(S): 100 INJECTION, SOLUTION SUBCUTANEOUS at 21:17

## 2023-05-29 RX ADMIN — Medication 1: at 17:45

## 2023-05-29 RX ADMIN — Medication 4: at 11:48

## 2023-05-29 RX ADMIN — HEPARIN SODIUM 550 UNIT(S)/HR: 5000 INJECTION INTRAVENOUS; SUBCUTANEOUS at 19:11

## 2023-05-29 NOTE — CONSULT NOTE ADULT - SUBJECTIVE AND OBJECTIVE BOX
HPI:  67 yo M PMH pafib, CAD multiple PCIs w/ recent TITO 3/7/23 to LCx, CKD3 IDDM2 p/w L CP. States he began experiencing L CP day prior to admission while eating, worse with exertion, radiating to back, associated w some SOB. No diaphoresis, n/v, abd pain, f/chills. Says this was similar to prior CP episodes requiring LHC. Denies infectious complaints including diarrhea, dysuria, rash.    In the ED VS noted - afebrile, HR 120s afib  Meds received - hep gtt, brillinta, ppi iv, metoprolol iv, labetalol 10 mg iv   (28 May 2023 12:59)    Nephrology was called for BARRY. The patient denied any hx of BARRY. No hx of kidney stones, no prostate disease. No urinary sx. He is on diuretics at home. No SOB, no LE edema.       PAST MEDICAL & SURGICAL HISTORY:  Former smoker      CAD in native artery      Type 2 diabetes mellitus      Hyperlipidemia, unspecified hyperlipidemia type      Essential hypertension, hypertension with unspecified goal      Afib      Hematoma of leg      Stented coronary artery      CHF (congestive heart failure)      s/p Carotid Endarterectomy  left          MEDICATIONS  (STANDING):  aspirin enteric coated 81 milliGRAM(s) Oral daily  atorvastatin 80 milliGRAM(s) Oral at bedtime  chlorhexidine 2% Cloths 1 Application(s) Topical daily  dextrose 5%. 1000 milliLiter(s) (100 mL/Hr) IV Continuous <Continuous>  dextrose 5%. 1000 milliLiter(s) (50 mL/Hr) IV Continuous <Continuous>  dextrose 50% Injectable 25 Gram(s) IV Push once  dextrose 50% Injectable 12.5 Gram(s) IV Push once  dextrose 50% Injectable 25 Gram(s) IV Push once  glucagon  Injectable 1 milliGRAM(s) IntraMuscular once  heparin  Infusion. 550 Unit(s)/Hr (5.5 mL/Hr) IV Continuous <Continuous>  insulin glargine Injectable (LANTUS) 20 Unit(s) SubCutaneous at bedtime  insulin lispro (ADMELOG) corrective regimen sliding scale   SubCutaneous three times a day before meals  insulin lispro (ADMELOG) corrective regimen sliding scale   SubCutaneous at bedtime  metoprolol tartrate 50 milliGRAM(s) Oral every 12 hours  sodium bicarbonate  Infusion 0.061 mEq/kG/Hr (50 mL/Hr) IV Continuous <Continuous>  ticagrelor 90 milliGRAM(s) Oral every 12 hours      Allergies    No Known Allergies    Intolerances        SOCIAL HISTORY:  Denies ETOh,Smoking,     FAMILY HISTORY:  Family history of heart disease  father  age 71    FH: atrial fibrillation  sister        REVIEW OF SYSTEMS:    CONSTITUTIONAL: No weakness, fevers or chills  EYES/ENT: No visual changes;  No vertigo or throat pain   NECK: No pain or stiffness  RESPIRATORY: No cough, wheezing, hemoptysis; No shortness of breath  CARDIOVASCULAR: No chest pain or palpitations  GASTROINTESTINAL: No abdominal or epigastric pain. No nausea, vomiting, or hematemesis; No diarrhea or constipation. No melena or hematochezia.  GENITOURINARY: No dysuria, frequency or hematuria  NEUROLOGICAL: No numbness or weakness  SKIN: No itching, burning, rashes, or lesions   All other review of systems is negative unless indicated above.    VITAL:  T(C): , Max: 36.7 (23 @ 17:34)  T(F): , Max: 98.1 (23 @ 17:34)  HR: 99 (23 @ 11:53)  BP: 100/56 (23 @ 11:53)  BP(mean): --  RR: 18 (23 @ 11:53)  SpO2: 96% (23 @ 11:53)  Wt(kg): --    I and O's:     @ 07:01  -   @ 07:00  --------------------------------------------------------  IN: 300 mL / OUT: 1500 mL / NET: -1200 mL     @ 07:01  -   @ 14:41  --------------------------------------------------------  IN: 310 mL / OUT: 320 mL / NET: -10 mL          PHYSICAL EXAM:    Constitutional: NAD  HEENT: PERRLA, EOMI,  MMM  Neck: No LAD, No JVD  Respiratory: CTAB  Cardiovascular: S1 and S2  Gastrointestinal: BS+, soft, NT/ND  Extremities: No peripheral edema  Neurological: A/O x 3, no focal deficits  Psychiatric: Normal mood, normal affect  : No Earl  Skin: No rashes  Access: Not applicable    LABS:                        12.9   12.66 )-----------( 320      ( 29 May 2023 08:59 )             38.3         139  |  101  |  29<H>  ----------------------------<  103<H>  3.7   |  20<L>  |  1.54<H>    Ca    9.2      29 May 2023 09:01  Mg     2.3         TPro  6.4  /  Alb  3.8  /  TBili  2.0<H>  /  DBili  x   /  AST  54<H>  /  ALT  18  /  AlkPhos  119        Urine Studies:          RADIOLOGY & ADDITIONAL STUDIES:        ASSESSMENT:    67 yo M with PMH of afib, CAD multiple PCIs w/ recent TITO 3/7/23 to LCx, CKD3 IDDM2 p/w chest pain    - BARRY DD pre-renal azotemia due to diuretics use vs DENNIS (s/p CT chest and abdomen with IV contrast) vs obstructive  - Hypertension controlled  - Volume status acceptable  - HAGMA + metabolic alkalosis       PLAN:   - Give 0.45% saline + 75 meq NaHCo3 at 50 cc/hr x 1L  - Urinalysis, Urine lytes, Urine Protein, Urine Creatinine  - Phosphorus level  - Post void bladder scan   - Hold Diuretics  - Strict I/O  - Daily weights  - Renal dosing of meds to creatinine clearance of 40-45 ml/min  - Avoid nephrotoxins as able  - Coronary angiogram when renal function is back to baseline    Thank you for the courtesy of the referral    Ashley Mayes MD  [facility]  Cell: [phone]

## 2023-05-29 NOTE — PROGRESS NOTE ADULT - ASSESSMENT
67 yo M PMH pafib, CAD multiple PCIs w/ recent TITO 3/7/23 to LCx, CKD3, IDDM2 p/w L CP adm with NSTEMI w/ course c/b afib RVR.

## 2023-05-29 NOTE — PROGRESS NOTE ADULT - SUBJECTIVE AND OBJECTIVE BOX
Date of service: 05-29-23 @ 23:33      Patient is a 68y old  Male who presents with a chief complaint of CP (29 May 2023 14:40)                                                               INTERVAL HPI/OVERNIGHT EVENTS:    REVIEW OF SYSTEMS:     CONSTITUTIONAL: No weakness, fevers or chills  EYES/ENT: No visual changes , no ear ache   NECK: No pain or stiffness  RESPIRATORY: No cough, wheezing,  No shortness of breath  CARDIOVASCULAR: No chest pain or palpitations  GASTROINTESTINAL: No abdominal pain  . No nausea, vomiting, or hematemesis; No diarrhea or constipation. No melena or hematochezia.  GENITOURINARY: No dysuria, frequency or hematuria  NEUROLOGICAL: No numbness or weakness  SKIN: No itching, burning, rashes, or lesions                                                                                                                                                                                                                                                                                 Medications:  MEDICATIONS  (STANDING):  aspirin enteric coated 81 milliGRAM(s) Oral daily  atorvastatin 80 milliGRAM(s) Oral at bedtime  chlorhexidine 2% Cloths 1 Application(s) Topical daily  dextrose 5%. 1000 milliLiter(s) (100 mL/Hr) IV Continuous <Continuous>  dextrose 5%. 1000 milliLiter(s) (50 mL/Hr) IV Continuous <Continuous>  dextrose 50% Injectable 25 Gram(s) IV Push once  dextrose 50% Injectable 12.5 Gram(s) IV Push once  dextrose 50% Injectable 25 Gram(s) IV Push once  glucagon  Injectable 1 milliGRAM(s) IntraMuscular once  heparin  Infusion. 550 Unit(s)/Hr (5.5 mL/Hr) IV Continuous <Continuous>  insulin glargine Injectable (LANTUS) 20 Unit(s) SubCutaneous at bedtime  insulin lispro (ADMELOG) corrective regimen sliding scale   SubCutaneous three times a day before meals  insulin lispro (ADMELOG) corrective regimen sliding scale   SubCutaneous at bedtime  metoprolol tartrate 50 milliGRAM(s) Oral every 12 hours  sodium bicarbonate  Infusion 0.061 mEq/kG/Hr (50 mL/Hr) IV Continuous <Continuous>  ticagrelor 90 milliGRAM(s) Oral every 12 hours    MEDICATIONS  (PRN):  acetaminophen     Tablet .. 650 milliGRAM(s) Oral every 6 hours PRN Temp greater or equal to 38C (100.4F), Mild Pain (1 - 3)  aluminum hydroxide/magnesium hydroxide/simethicone Suspension 30 milliLiter(s) Oral every 4 hours PRN Dyspepsia  dextrose Oral Gel 15 Gram(s) Oral once PRN Blood Glucose LESS THAN 70 milliGRAM(s)/deciliter  dextrose Oral Gel 15 Gram(s) Oral once PRN Blood Glucose LESS THAN 70 milliGRAM(s)/deciliter  heparin   Injectable 3800 Unit(s) IV Push every 6 hours PRN For aPTT less than 40  melatonin 3 milliGRAM(s) Oral at bedtime PRN Insomnia  ondansetron Injectable 4 milliGRAM(s) IV Push every 8 hours PRN Nausea and/or Vomiting       Allergies    No Known Allergies    Intolerances      Vital Signs Last 24 Hrs  T(C): 36.8 (29 May 2023 20:05), Max: 36.8 (29 May 2023 20:05)  T(F): 98.2 (29 May 2023 20:05), Max: 98.2 (29 May 2023 20:05)  HR: 91 (29 May 2023 20:05) (84 - 108)  BP: 134/78 (29 May 2023 20:05) (100/56 - 134/78)  BP(mean): --  RR: 18 (29 May 2023 20:05) (17 - 18)  SpO2: 98% (29 May 2023 20:05) (95% - 98%)    Parameters below as of 29 May 2023 20:05  Patient On (Oxygen Delivery Method): room air      CAPILLARY BLOOD GLUCOSE      POCT Blood Glucose.: 171 mg/dL (29 May 2023 21:14)  POCT Blood Glucose.: 162 mg/dL (29 May 2023 17:15)  POCT Blood Glucose.: 320 mg/dL (29 May 2023 11:44)  POCT Blood Glucose.: 113 mg/dL (29 May 2023 07:45)      05-28 @ 07:01 - 05-29 @ 07:00  --------------------------------------------------------  IN: 300 mL / OUT: 1500 mL / NET: -1200 mL    05-29 @ 07:01 - 05-29 @ 23:33  --------------------------------------------------------  IN: 310 mL / OUT: 490 mL / NET: -180 mL      Physical Exam:    Daily     Daily   General:  Well appearing, NAD, not cachetic  HEENT:  Nonicteric, PERRLA  CV:  RRR, S1S2   Lungs:  CTA B/L, no wheezes, rales, rhonchi  Abdomen:  Soft, non-tender, no distended, positive BS  Extremities:  2+ pulses, no c/c, no edema  Skin:  Warm and dry, no rashes  :  No moreno  Neuro:  AAOx3, non-focal, grossly intact                                                                                                                                                                                                                                                                                                LABS:                               12.9   12.66 )-----------( 320      ( 29 May 2023 08:59 )             38.3                      05-29    139  |  101  |  29<H>  ----------------------------<  103<H>  3.7   |  20<L>  |  1.54<H>    Ca    9.2      29 May 2023 09:01    TPro  6.4  /  Alb  3.8  /  TBili  2.0<H>  /  DBili  x   /  AST  54<H>  /  ALT  18  /  AlkPhos  119  05-29                       RADIOLOGY & ADDITIONAL TESTS         I personally reviewed: [  ]EKG   [  ]CXR    [  ] CT      A/P:         Discussed with :     Derek consultants' Notes   Time spent :

## 2023-05-29 NOTE — PROGRESS NOTE ADULT - SUBJECTIVE AND OBJECTIVE BOX
No further chest pain   Complain of mild CLEARY  /86  HR 86 AF  O2 Sat 98% on RA  Lungs clear  Irregular rhythm  No edema    Troponin 1405 down to 770  WBC 12.66  Hgb 12.9  Hct 38.3  Plt 320K  BUN 29  Crt 1.54  PTT 53.2    Imp:  NSTEMI with peak Troponin 1405   AF rate is not adequately controlled  no CHF at present  Elevated WBC is due to the acute MI  Rec:  Increase Metoprolol to 75 mg bid   Would also start Valsartan 40 mg qd  Arrange for coronary angiography in the AM

## 2023-05-30 LAB
ANION GAP SERPL CALC-SCNC: 12 MMOL/L — SIGNIFICANT CHANGE UP (ref 5–17)
APTT BLD: 52.2 SEC — HIGH (ref 27.5–35.5)
APTT BLD: 57.8 SEC — HIGH (ref 27.5–35.5)
APTT BLD: 59.5 SEC — HIGH (ref 27.5–35.5)
BUN SERPL-MCNC: 28 MG/DL — HIGH (ref 7–23)
CALCIUM SERPL-MCNC: 9.7 MG/DL — SIGNIFICANT CHANGE UP (ref 8.4–10.5)
CHLORIDE SERPL-SCNC: 105 MMOL/L — SIGNIFICANT CHANGE UP (ref 96–108)
CO2 SERPL-SCNC: 24 MMOL/L — SIGNIFICANT CHANGE UP (ref 22–31)
CREAT SERPL-MCNC: 1.69 MG/DL — HIGH (ref 0.5–1.3)
EGFR: 44 ML/MIN/1.73M2 — LOW
GLUCOSE BLDC GLUCOMTR-MCNC: 121 MG/DL — HIGH (ref 70–99)
GLUCOSE BLDC GLUCOMTR-MCNC: 170 MG/DL — HIGH (ref 70–99)
GLUCOSE BLDC GLUCOMTR-MCNC: 208 MG/DL — HIGH (ref 70–99)
GLUCOSE BLDC GLUCOMTR-MCNC: 232 MG/DL — HIGH (ref 70–99)
GLUCOSE SERPL-MCNC: 99 MG/DL — SIGNIFICANT CHANGE UP (ref 70–99)
HCT VFR BLD CALC: 37.7 % — LOW (ref 39–50)
HGB BLD-MCNC: 12.9 G/DL — LOW (ref 13–17)
INR BLD: 1.75 RATIO — HIGH (ref 0.88–1.16)
MCHC RBC-ENTMCNC: 30.9 PG — SIGNIFICANT CHANGE UP (ref 27–34)
MCHC RBC-ENTMCNC: 34.2 GM/DL — SIGNIFICANT CHANGE UP (ref 32–36)
MCV RBC AUTO: 90.4 FL — SIGNIFICANT CHANGE UP (ref 80–100)
MRSA PCR RESULT.: SIGNIFICANT CHANGE UP
NRBC # BLD: 0 /100 WBCS — SIGNIFICANT CHANGE UP (ref 0–0)
PLATELET # BLD AUTO: 328 K/UL — SIGNIFICANT CHANGE UP (ref 150–400)
POTASSIUM SERPL-MCNC: 4.1 MMOL/L — SIGNIFICANT CHANGE UP (ref 3.5–5.3)
POTASSIUM SERPL-SCNC: 4.1 MMOL/L — SIGNIFICANT CHANGE UP (ref 3.5–5.3)
PROTHROM AB SERPL-ACNC: 20.4 SEC — HIGH (ref 10.5–13.4)
RBC # BLD: 4.17 M/UL — LOW (ref 4.2–5.8)
RBC # FLD: 13.7 % — SIGNIFICANT CHANGE UP (ref 10.3–14.5)
S AUREUS DNA NOSE QL NAA+PROBE: SIGNIFICANT CHANGE UP
SODIUM SERPL-SCNC: 141 MMOL/L — SIGNIFICANT CHANGE UP (ref 135–145)
TROPONIN T, HIGH SENSITIVITY RESULT: 965 NG/L — HIGH (ref 0–51)
WBC # BLD: 10.36 K/UL — SIGNIFICANT CHANGE UP (ref 3.8–10.5)
WBC # FLD AUTO: 10.36 K/UL — SIGNIFICANT CHANGE UP (ref 3.8–10.5)

## 2023-05-30 PROCEDURE — 93308 TTE F-UP OR LMTD: CPT | Mod: 26

## 2023-05-30 RX ADMIN — Medication 81 MILLIGRAM(S): at 12:14

## 2023-05-30 RX ADMIN — Medication 1: at 12:23

## 2023-05-30 RX ADMIN — TICAGRELOR 90 MILLIGRAM(S): 90 TABLET ORAL at 17:18

## 2023-05-30 RX ADMIN — INSULIN GLARGINE 20 UNIT(S): 100 INJECTION, SOLUTION SUBCUTANEOUS at 21:38

## 2023-05-30 RX ADMIN — CHLORHEXIDINE GLUCONATE 1 APPLICATION(S): 213 SOLUTION TOPICAL at 12:14

## 2023-05-30 RX ADMIN — HEPARIN SODIUM 550 UNIT(S)/HR: 5000 INJECTION INTRAVENOUS; SUBCUTANEOUS at 05:33

## 2023-05-30 RX ADMIN — HEPARIN SODIUM 650 UNIT(S)/HR: 5000 INJECTION INTRAVENOUS; SUBCUTANEOUS at 19:26

## 2023-05-30 RX ADMIN — Medication 50 MILLIGRAM(S): at 17:18

## 2023-05-30 RX ADMIN — ATORVASTATIN CALCIUM 80 MILLIGRAM(S): 80 TABLET, FILM COATED ORAL at 21:41

## 2023-05-30 RX ADMIN — Medication 50 MILLIGRAM(S): at 05:05

## 2023-05-30 RX ADMIN — HEPARIN SODIUM 650 UNIT(S)/HR: 5000 INJECTION INTRAVENOUS; SUBCUTANEOUS at 13:06

## 2023-05-30 RX ADMIN — HEPARIN SODIUM 650 UNIT(S)/HR: 5000 INJECTION INTRAVENOUS; SUBCUTANEOUS at 06:59

## 2023-05-30 RX ADMIN — TICAGRELOR 90 MILLIGRAM(S): 90 TABLET ORAL at 03:04

## 2023-05-30 RX ADMIN — HEPARIN SODIUM 650 UNIT(S)/HR: 5000 INJECTION INTRAVENOUS; SUBCUTANEOUS at 06:35

## 2023-05-30 RX ADMIN — Medication 2: at 17:31

## 2023-05-30 NOTE — PROGRESS NOTE ADULT - ASSESSMENT
68 year old male with chronic AF S/P LAD stent and a recent Cx stent on 3/7/23 now presents with an episode of chest tightness.  Currently chest pain free but does complain of fatigue on minimal exertion.  Arranged for cardiac cath by Dr. Mckeon for today.  AFIB better controlled on higher dose of metoprolol.  Please arrange for echo.  Continue current medications.  Add valsartan 40 mg qd  Further treatment based on cardiac cath results and EF

## 2023-05-30 NOTE — PROGRESS NOTE ADULT - SUBJECTIVE AND OBJECTIVE BOX
Date of service: 05-30-23 @ 15:29      Patient is a 68y old  Male who presents with a chief complaint of CP (30 May 2023 10:32)                                                               INTERVAL HPI/OVERNIGHT EVENTS:    REVIEW OF SYSTEMS:     CONSTITUTIONAL: No weakness, fevers or chills  RESPIRATORY: No cough, wheezing,  No shortness of breath  CARDIOVASCULAR: No chest pain or palpitations  GASTROINTESTINAL: No abdominal pain  . No nausea, vomiting, or hematemesis; No diarrhea or constipation. No melena or hematochezia.  GENITOURINARY: No dysuria, frequency or hematuria  NEUROLOGICAL: No numbness or weakness                                                                                                                                                                                                                                                                                 Medications:  MEDICATIONS  (STANDING):  aspirin enteric coated 81 milliGRAM(s) Oral daily  atorvastatin 80 milliGRAM(s) Oral at bedtime  chlorhexidine 2% Cloths 1 Application(s) Topical daily  dextrose 5%. 1000 milliLiter(s) (100 mL/Hr) IV Continuous <Continuous>  dextrose 5%. 1000 milliLiter(s) (50 mL/Hr) IV Continuous <Continuous>  dextrose 50% Injectable 25 Gram(s) IV Push once  dextrose 50% Injectable 12.5 Gram(s) IV Push once  dextrose 50% Injectable 25 Gram(s) IV Push once  glucagon  Injectable 1 milliGRAM(s) IntraMuscular once  heparin  Infusion. 550 Unit(s)/Hr (5.5 mL/Hr) IV Continuous <Continuous>  insulin glargine Injectable (LANTUS) 20 Unit(s) SubCutaneous at bedtime  insulin lispro (ADMELOG) corrective regimen sliding scale   SubCutaneous three times a day before meals  insulin lispro (ADMELOG) corrective regimen sliding scale   SubCutaneous at bedtime  metoprolol tartrate 50 milliGRAM(s) Oral every 12 hours  sodium bicarbonate  Infusion 0.061 mEq/kG/Hr (50 mL/Hr) IV Continuous <Continuous>  ticagrelor 90 milliGRAM(s) Oral every 12 hours    MEDICATIONS  (PRN):  acetaminophen     Tablet .. 650 milliGRAM(s) Oral every 6 hours PRN Temp greater or equal to 38C (100.4F), Mild Pain (1 - 3)  aluminum hydroxide/magnesium hydroxide/simethicone Suspension 30 milliLiter(s) Oral every 4 hours PRN Dyspepsia  dextrose Oral Gel 15 Gram(s) Oral once PRN Blood Glucose LESS THAN 70 milliGRAM(s)/deciliter  dextrose Oral Gel 15 Gram(s) Oral once PRN Blood Glucose LESS THAN 70 milliGRAM(s)/deciliter  heparin   Injectable 3800 Unit(s) IV Push every 6 hours PRN For aPTT less than 40  melatonin 3 milliGRAM(s) Oral at bedtime PRN Insomnia  ondansetron Injectable 4 milliGRAM(s) IV Push every 8 hours PRN Nausea and/or Vomiting       Allergies    No Known Allergies    Intolerances      Vital Signs Last 24 Hrs  T(C): 36.5 (30 May 2023 04:29), Max: 36.8 (29 May 2023 20:05)  T(F): 97.7 (30 May 2023 04:29), Max: 98.2 (29 May 2023 20:05)  HR: 94 (30 May 2023 04:29) (84 - 94)  BP: 117/73 (30 May 2023 04:29) (116/73 - 134/78)  BP(mean): --  RR: 18 (30 May 2023 04:29) (17 - 18)  SpO2: 93% (30 May 2023 04:29) (93% - 98%)    Parameters below as of 30 May 2023 04:29  Patient On (Oxygen Delivery Method): room air      CAPILLARY BLOOD GLUCOSE      POCT Blood Glucose.: 170 mg/dL (30 May 2023 12:18)  POCT Blood Glucose.: 121 mg/dL (30 May 2023 07:39)  POCT Blood Glucose.: 171 mg/dL (29 May 2023 21:14)  POCT Blood Glucose.: 162 mg/dL (29 May 2023 17:15)      05-29 @ 07:01  -  05-30 @ 07:00  --------------------------------------------------------  IN: 310 mL / OUT: 1490 mL / NET: -1180 mL    05-30 @ 07:01  -  05-30 @ 15:29  --------------------------------------------------------  IN: 480 mL / OUT: 0 mL / NET: 480 mL      Physical Exam:    Daily     Daily   General:  Well appearing, NAD, not cachetic  HEENT:  Nonicteric, PERRLA  CV:  RRR, S1S2   Lungs:  CTA B/L, no wheezes, rales, rhonchi  Abdomen:  Soft, non-tender, no distended, positive BS  Extremities:  2+ pulses, no c/c, no edema  Skin:  Warm and dry, no rashes  :  No moreno  Neuro:  AAOx3, non-focal, grossly intact                                                                                                                                                                                                                                                                                                LABS:                               12.9   10.36 )-----------( 328      ( 30 May 2023 05:51 )             37.7                      05-30    141  |  105  |  28<H>  ----------------------------<  99  4.1   |  24  |  1.69<H>    Ca    9.7      30 May 2023 05:51    TPro  6.4  /  Alb  3.8  /  TBili  2.0<H>  /  DBili  x   /  AST  54<H>  /  ALT  18  /  AlkPhos  119  05-29                       RADIOLOGY & ADDITIONAL TESTS         I personally reviewed: [  ]EKG   [  ]CXR    [  ] CT      A/P:         Discussed with :     Derek consultants' Notes   Time spent :

## 2023-05-30 NOTE — ED PROVIDER NOTE - ADMIT DISPOSITION PRESENT ON ADMISSION SEPSIS
F: s/p 2LNS in ED  E: replete as needed  N: minced and moist CC diet   DVT ppx: lovenox   Full Code  Dispo: [facility]
F: s/p 2LNS in ED  E: replete as needed  N: minced and moist CC diet   DVT ppx: lovenox   Full Code  Dispo: [facility]
No

## 2023-05-30 NOTE — PROGRESS NOTE ADULT - SUBJECTIVE AND OBJECTIVE BOX
SUBJECTIVE: Complains of fatigue on minimal exertion.  	  MEDICATIONS:  metoprolol tartrate 50 milliGRAM(s) Oral every 12 hours        acetaminophen     Tablet .. 650 milliGRAM(s) Oral every 6 hours PRN  melatonin 3 milliGRAM(s) Oral at bedtime PRN  ondansetron Injectable 4 milliGRAM(s) IV Push every 8 hours PRN    aluminum hydroxide/magnesium hydroxide/simethicone Suspension 30 milliLiter(s) Oral every 4 hours PRN    atorvastatin 80 milliGRAM(s) Oral at bedtime  dextrose 50% Injectable 12.5 Gram(s) IV Push once  dextrose 50% Injectable 25 Gram(s) IV Push once  dextrose 50% Injectable 25 Gram(s) IV Push once  dextrose Oral Gel 15 Gram(s) Oral once PRN  dextrose Oral Gel 15 Gram(s) Oral once PRN  glucagon  Injectable 1 milliGRAM(s) IntraMuscular once  insulin glargine Injectable (LANTUS) 20 Unit(s) SubCutaneous at bedtime  insulin lispro (ADMELOG) corrective regimen sliding scale   SubCutaneous at bedtime  insulin lispro (ADMELOG) corrective regimen sliding scale   SubCutaneous three times a day before meals    aspirin enteric coated 81 milliGRAM(s) Oral daily  chlorhexidine 2% Cloths 1 Application(s) Topical daily  dextrose 5%. 1000 milliLiter(s) IV Continuous <Continuous>  dextrose 5%. 1000 milliLiter(s) IV Continuous <Continuous>  heparin   Injectable 3800 Unit(s) IV Push every 6 hours PRN  heparin  Infusion. 550 Unit(s)/Hr IV Continuous <Continuous>  sodium bicarbonate  Infusion 0.061 mEq/kG/Hr IV Continuous <Continuous>  ticagrelor 90 milliGRAM(s) Oral every 12 hours      REVIEW OF SYSTEMS:    CONSTITUTIONAL: No fever, weight loss, or fatigue  EYES: No eye pain, visual disturbances, or discharge  NECK: No pain or stiffness  RESPIRATORY: No cough, wheezing, chills or hemoptysis; No Shortness of Breath  CARDIOVASCULAR: No chest pain, palpitations, dizziness, or leg swelling  GASTROINTESTINAL: No abdominal or epigastric pain. No nausea, vomiting, or hematemesis; No diarrhea or constipation. No melena or hematochezia.  GENITOURINARY: No dysuria, frequency, hematuria, or incontinence  NEUROLOGICAL: No headaches, memory loss, loss of strength, numbness, or tremors  SKIN: No itching, burning, rashes, or lesions   LYMPH Nodes: No enlarged glands  MUSCULOSKELETAL: No joint pain or swelling; No muscle, back, or extremity pain  All other review of systems are negative.  	  [ ] Unable to obtain    PHYSICAL EXAM:  T(C): 36.5 (05-30-23 @ 04:29), Max: 36.8 (05-29-23 @ 20:05)  HR: 94 (05-30-23 @ 04:29) (84 - 99)  BP: 117/73 (05-30-23 @ 04:29) (100/56 - 134/78)  RR: 18 (05-30-23 @ 04:29) (17 - 18)  SpO2: 93% (05-30-23 @ 04:29) (93% - 98%)  Wt(kg): --  I&O's Summary    29 May 2023 07:01  -  30 May 2023 07:00  --------------------------------------------------------  IN: 310 mL / OUT: 1490 mL / NET: -1180 mL          PHYSICAL EXAM    Appearance: Normal	  HEENT:   Normal oral mucosa, PERRL, EOMI	  NECK: Soft and supple, No LAD, No JVD  Cardiovascular: Regular Rate and Rhythm, Normal S1 S2, No murmurs, No clicks, gallops or rubs  Respiratory: Lungs clear to auscultation	  Gastrointestinal:  Soft, Non-tender, + BS	  Skin: No rashes, No ecchymoses, No cyanosis  Neurologic: Non-focal  Extremities: No clubbing, cyanosis or edema  Vascular: Peripheral pulses palpable 2+ bilaterally    TELEMETRY: AF with moderate ventricular respone	    ECG:  	  RADIOLOGY  DIAGNOSTIC TESTING:  [ ] Echocardiogram:  [X ] Catheterization: to be done today  [ ] Stress Test:    OTHER: 	    LABS:	 	    CARDIAC MARKERS:  Troponin T,  serum   965 (05-30 @ 05:51)  765 (05-29 @ 23:04)  748 (05-29 @ 18:24)  770 (05-29 @ 09:01)  993 (05-28 @ 17:07)  1405 (05-28 @ 14:10)  1193 (05-28 @ 06:06)  690 (05-28 @ 01:22)  76 (05-27 @ 20:59)  24 (05-04 @ 01:12)  24 (05-03 @ 22:49)                                  12.9   10.36 )-----------( 328      ( 30 May 2023 05:51 )             37.7     05-30    141  |  105  |  28<H>  ----------------------------<  99  4.1   |  24  |  1.69<H>    Ca    9.7      30 May 2023 05:51    TPro  6.4  /  Alb  3.8  /  TBili  2.0<H>  /  DBili  x   /  AST  54<H>  /  ALT  18  /  AlkPhos  119  05-29

## 2023-05-30 NOTE — PROGRESS NOTE ADULT - SUBJECTIVE AND OBJECTIVE BOX
Overnight events noted      VITAL:  T(C): , Max: 36.8 (23 @ 20:05)  T(F): , Max: 98.2 (23 @ 20:05)  HR: 94 (23 @ 04:29)  BP: 117/73 (23 @ 04:29)  BP(mean): --  RR: 18 (23 @ 04:29)  SpO2: 93% (23 @ 04:29)  Wt(kg): --      PHYSICAL EXAM:  Constitutional: NAD  HEENT: PERRLA, EOMI,  MMM  Neck: No LAD, No JVD  Respiratory: CTAB  Cardiovascular: S1 and S2  Gastrointestinal: BS+, soft, NT/ND  Extremities: No peripheral edema  Neurological: A/O x 3, no focal deficits  : No Earl  Skin: No rashes    LABS:                        12.9   10.36 )-----------( 328      ( 30 May 2023 05:51 )             37.7     Na(141)/K(4.1)/Cl(105)/HCO3(24)/BUN(28)/Cr(1.69)Glu(99)/Ca(9.7)/Mg(--)/PO4(--)     @ 05:51  Na(139)/K(3.7)/Cl(101)/HCO3(20)/BUN(29)/Cr(1.54)Glu(103)/Ca(9.2)/Mg(--)/PO4(--)     @ 09:01  Na(137)/K(4.6)/Cl(100)/HCO3(21)/BUN(23)/Cr(1.32)Glu(157)/Ca(9.1)/Mg(--)/PO4(--)     @ 06:06  Na(135)/K(4.4)/Cl(95)/HCO3(21)/BUN(22)/Cr(1.41)Glu(298)/Ca(9.4)/Mg(2.3)/PO4(--)     @ 20:59    Urinalysis Basic - ( 29 May 2023 17:16 )  Color: Yellow / Appearance: Clear / S.027 / pH: x  Gluc: x / Ketone: Negative  / Bili: Negative / Urobili: 4 mg/dL   Blood: x / Protein: Trace / Nitrite: Negative   Leuk Esterase: Negative / RBC: 1 /hpf / WBC 2 /HPF   Sq Epi: x / Non Sq Epi: x / Bacteria: Negative  Creatinine, Random Urine: 108 mg/dL ( @ 17:16)  Protein/Creatinine Ratio Calculation: 0.2 Ratio ( @ 17:16)  Sodium, Random Urine: 26 mmol/L ( @ 17:16)      IMPRESSION: 68M w/ HTN, DM2, AFib, CAD, and CKD, 23 p/w chest pain    (1)CKD - nonproteinuric CKD3a - due to hypertension/atherosclerosis  (2)BARRY - likely ATN from contrast nephropathy - rather mild  (3)Lytes - acceptable  (4)Cardiac - presented with chest pain - to require eventual cath     RECOMMEND:  (1)Hold off with cardiac cath for now/hold off with periprocedural IVF  (2)No ACEI/ARB for now  (3)BMP daily  (4)If creatinine is downtrending as of tomorrow, then would not object to cath tomorrow; can give periprocedural 1/2NS+75meq/L NaHCO3, 100cc/h x 5hours starting at least 1 hour prior to cath          Markell Walton MD  [facility]  Office/on call physician: [phone]  Cell (-7p): [phone]       no pain, no sob      VITAL:  T(C): , Max: 36.8 (23 @ 20:05)  T(F): , Max: 98.2 (23 @ 20:05)  HR: 94 (23 @ 04:29)  BP: 117/73 (23 @ 04:29)  BP(mean): --  RR: 18 (23 @ 04:29)  SpO2: 93% (23 @ 04:29)  Wt(kg): --      PHYSICAL EXAM:  Constitutional: NAD  HEENT: PERRLA, EOMI,  MMM  Neck: No LAD, No JVD  Respiratory: CTAB  Cardiovascular: S1 and S2  Gastrointestinal: BS+, soft, NT/ND  Extremities: No peripheral edema  Neurological: A/O x 3, no focal deficits  : No Earl  Skin: No rashes    LABS:                        12.9   10.36 )-----------( 328      ( 30 May 2023 05:51 )             37.7     Na(141)/K(4.1)/Cl(105)/HCO3(24)/BUN(28)/Cr(1.69)Glu(99)/Ca(9.7)/Mg(--)/PO4(--)     @ 05:51  Na(139)/K(3.7)/Cl(101)/HCO3(20)/BUN(29)/Cr(1.54)Glu(103)/Ca(9.2)/Mg(--)/PO4(--)     @ 09:01  Na(137)/K(4.6)/Cl(100)/HCO3(21)/BUN(23)/Cr(1.32)Glu(157)/Ca(9.1)/Mg(--)/PO4(--)     @ 06:06  Na(135)/K(4.4)/Cl(95)/HCO3(21)/BUN(22)/Cr(1.41)Glu(298)/Ca(9.4)/Mg(2.3)/PO4(--)     @ 20:59    Urinalysis Basic - ( 29 May 2023 17:16 )  Color: Yellow / Appearance: Clear / S.027 / pH: x  Gluc: x / Ketone: Negative  / Bili: Negative / Urobili: 4 mg/dL   Blood: x / Protein: Trace / Nitrite: Negative   Leuk Esterase: Negative / RBC: 1 /hpf / WBC 2 /HPF   Sq Epi: x / Non Sq Epi: x / Bacteria: Negative  Creatinine, Random Urine: 108 mg/dL ( @ 17:16)  Protein/Creatinine Ratio Calculation: 0.2 Ratio ( @ 17:16)  Sodium, Random Urine: 26 mmol/L ( @ 17:16)      IMPRESSION: 68M w/ HTN, DM2, AFib, CAD, and CKD, 23 p/w chest pain    (1)CKD - nonproteinuric CKD3a - due to hypertension/atherosclerosis  (2)BARRY - likely ATN from contrast nephropathy - rather mild  (3)Lytes - acceptable  (4)Cardiac - presented with chest pain - to require eventual cath     RECOMMEND:  (1)Hold off with cardiac cath for now/hold off with periprocedural IVF  (2)No ACEI/ARB for now  (3)BMP daily  (4)If creatinine is downtrending as of tomorrow, then would not object to cath tomorrow; can give periprocedural 1/2NS+75meq/L NaHCO3, 100cc/h x 5hours starting at least 1 hour prior to cath          Markell Walton MD  [facility]  Office/on call physician: [phone]  Cell (-7p): [phone]

## 2023-05-30 NOTE — CHART NOTE - NSCHARTNOTEFT_GEN_A_CORE
Call from Dr. Segundo with results of Echo   < from: TTE W or WO Ultrasound Enhancing Agent (05.30.23 @ 10:53) >       1. No evidence of a thrombus in the left ventricle.   2. Basal and mid inferior wall, basal and mid inferolateral wall, and basal inferoseptal segment are abnormal.   3. Compared to the transthoracic echocardiogram performed on 5/5/2023 There is new inferior and inferolateral hypokinesis..     Cardiology Dr. Santana was notified and stated patient will need cardiac cath in am
Emergent LHC Evaluation Note:    House cardiology asked by pt's private cardiology group to evaluate pt for emergent LHC.    HPI:  68M w/ hx of HTN, HLD, DM, HFpEF, AF, CAD (s/p prior LAD stent and recent proximal Cx stent on 3/7/23, residual distal RCA disease), presenting with chest pain. The pt reports on evening of presentation he was at dinner with family when he felt sudden onset chest pain. The pain was located substernal and radiated to both sides of chest as well as back. The pain was constant, not worsened with exertion or improved with rest. Associated with mild dyspnea. Due the pain presented to ED for further care.    In ED found to have elevated troponin consistent with NSTEMI. Treated w/ aspirin load, brilinta load, hep bolus/gtt ACS protocol. Pt's private cardiology group asked house cardiology to evaluate for emergent LHC. At time of my bedside evaluation pt reports his chest pain had completely resolved, denies any current symptoms.    PMHx:   Carotid Stenosis    Diabetes Mellitus    Neuropathy    Former smoker    CAD in native artery    Type 2 diabetes mellitus    Hyperlipidemia, unspecified hyperlipidemia type    Essential hypertension, hypertension with unspecified goal    Afib    Hematoma of leg    Stented coronary artery    CHF (congestive heart failure)        PSHx:   No Past Surgical History    s/p Carotid Endarterectomy        Home Meds:    Current Meds:   heparin   Injectable 3800 Unit(s) IV Push every 6 hours PRN  heparin  Infusion.  Unit(s)/Hr IV Continuous <Continuous>      Allergies:  No Known Allergies      FAMILY HISTORY:  Family history of heart disease  father  age 71    FH: atrial fibrillation  sister      All other review of systems is negative unless indicated above.    Physical Exam:  T(F): 97.6 (-), Max: 97.6 (-)  HR: 107 (-) (107 - 165)  BP: 148/86 (-) (126/97 - 165/105)  RR: 20 (-)  SpO2: 98% (-)  GENERAL: No acute distress, well-developed  HEAD: Atraumatic, Normocephalic  ENT: EOMI, PERRLA, conjunctiva and sclera clear, Neck supple, No JVD, moist mucosa  CHEST/LUNG: Clear to auscultation bilaterally; No wheeze, equal breath sounds bilaterally   HEART: Irregularly irregular; No murmurs, rubs, or gallops  ABDOMEN: Soft, Nontender, Nondistended; Bowel sounds present  EXTREMITIES:  No clubbing, cyanosis, or edema    Cardiovascular Diagnostic Testing:    ECG: AF w/ RVR (), normal axis, nonspecific T wave inversions in inferior and lateral leads      Labs: Personally reviewed                        13.6   14.54 )-----------( 344      ( 27 May 2023 20:59 )             40.0     05    135  |  95<L>  |  22  ----------------------------<  298<H>  4.4   |  21<L>  |  1.41<H>    Ca    9.4      27 May 2023 20:59  Mg     2.3         TPro  7.3  /  Alb  4.1  /  TBili  3.2<H>  /  DBili  x   /  AST  33  /  ALT  18  /  AlkPhos  149<H>      PT/INR - ( 27 May 2023 20:59 )   PT: 48.0 sec;   INR: 4.08 ratio         PTT - ( 27 May 2023 20:59 )  PTT:45.0 sec  CARDIAC MARKERS ( 28 May 2023 01:22 )  690 ng/L / x     / x     / x     / x     / x      CARDIAC MARKERS ( 27 May 2023 20:59 )  76 ng/L / x     / x     / x     / x     / x    A/P:  68M w/ hx of HTN, HLD, DM, HFpEF, AF, CAD (s/p prior LAD stent and recent proximal Cx stent on 3/7/23, residual distal RCA disease), presenting with chest pain, found to have NSTEMI. House cardiology asked by pt's private cardiology to evaluate for emergent LHC.    Pt currently chest pain free and asymptomatic, hemodynamically stable, EKG with nonspecific findings. Case and EKG discussed with on-call interventional attending, no plan for emergent LHC at this time. Being treated for NSTEMI w/ DAPT and hep gtt. Discussed with primary team.

## 2023-05-31 LAB
ANION GAP SERPL CALC-SCNC: 15 MMOL/L — SIGNIFICANT CHANGE UP (ref 5–17)
APTT BLD: 52.8 SEC — HIGH (ref 27.5–35.5)
BUN SERPL-MCNC: 30 MG/DL — HIGH (ref 7–23)
CALCIUM SERPL-MCNC: 9.7 MG/DL — SIGNIFICANT CHANGE UP (ref 8.4–10.5)
CHLORIDE SERPL-SCNC: 104 MMOL/L — SIGNIFICANT CHANGE UP (ref 96–108)
CO2 SERPL-SCNC: 20 MMOL/L — LOW (ref 22–31)
CREAT SERPL-MCNC: 1.6 MG/DL — HIGH (ref 0.5–1.3)
EGFR: 47 ML/MIN/1.73M2 — LOW
GLUCOSE BLDC GLUCOMTR-MCNC: 130 MG/DL — HIGH (ref 70–99)
GLUCOSE BLDC GLUCOMTR-MCNC: 139 MG/DL — HIGH (ref 70–99)
GLUCOSE BLDC GLUCOMTR-MCNC: 184 MG/DL — HIGH (ref 70–99)
GLUCOSE BLDC GLUCOMTR-MCNC: 251 MG/DL — HIGH (ref 70–99)
GLUCOSE SERPL-MCNC: 127 MG/DL — HIGH (ref 70–99)
HCT VFR BLD CALC: 39.2 % — SIGNIFICANT CHANGE UP (ref 39–50)
HGB BLD-MCNC: 13.2 G/DL — SIGNIFICANT CHANGE UP (ref 13–17)
INR BLD: 1.51 RATIO — HIGH (ref 0.88–1.16)
MCHC RBC-ENTMCNC: 30.8 PG — SIGNIFICANT CHANGE UP (ref 27–34)
MCHC RBC-ENTMCNC: 33.7 GM/DL — SIGNIFICANT CHANGE UP (ref 32–36)
MCV RBC AUTO: 91.6 FL — SIGNIFICANT CHANGE UP (ref 80–100)
NRBC # BLD: 0 /100 WBCS — SIGNIFICANT CHANGE UP (ref 0–0)
PLATELET # BLD AUTO: 316 K/UL — SIGNIFICANT CHANGE UP (ref 150–400)
POTASSIUM SERPL-MCNC: 3.8 MMOL/L — SIGNIFICANT CHANGE UP (ref 3.5–5.3)
POTASSIUM SERPL-SCNC: 3.8 MMOL/L — SIGNIFICANT CHANGE UP (ref 3.5–5.3)
PROTHROM AB SERPL-ACNC: 17.6 SEC — HIGH (ref 10.5–13.4)
RBC # BLD: 4.28 M/UL — SIGNIFICANT CHANGE UP (ref 4.2–5.8)
RBC # FLD: 13.8 % — SIGNIFICANT CHANGE UP (ref 10.3–14.5)
SODIUM SERPL-SCNC: 139 MMOL/L — SIGNIFICANT CHANGE UP (ref 135–145)
WBC # BLD: 10.08 K/UL — SIGNIFICANT CHANGE UP (ref 3.8–10.5)
WBC # FLD AUTO: 10.08 K/UL — SIGNIFICANT CHANGE UP (ref 3.8–10.5)

## 2023-05-31 PROCEDURE — 93458 L HRT ARTERY/VENTRICLE ANGIO: CPT | Mod: 26

## 2023-05-31 PROCEDURE — 99152 MOD SED SAME PHYS/QHP 5/>YRS: CPT

## 2023-05-31 RX ORDER — SODIUM CHLORIDE 9 MG/ML
1000 INJECTION, SOLUTION INTRAVENOUS
Refills: 0 | Status: DISCONTINUED | OUTPATIENT
Start: 2023-05-31 | End: 2023-06-01

## 2023-05-31 RX ORDER — HEPARIN SODIUM 5000 [USP'U]/ML
3500 INJECTION INTRAVENOUS; SUBCUTANEOUS EVERY 6 HOURS
Refills: 0 | Status: DISCONTINUED | OUTPATIENT
Start: 2023-05-31 | End: 2023-06-02

## 2023-05-31 RX ORDER — HEPARIN SODIUM 5000 [USP'U]/ML
650 INJECTION INTRAVENOUS; SUBCUTANEOUS
Qty: 25000 | Refills: 0 | Status: DISCONTINUED | OUTPATIENT
Start: 2023-05-31 | End: 2023-06-02

## 2023-05-31 RX ADMIN — SODIUM CHLORIDE 100 MILLILITER(S): 9 INJECTION, SOLUTION INTRAVENOUS at 09:23

## 2023-05-31 RX ADMIN — Medication 50 MILLIGRAM(S): at 17:31

## 2023-05-31 RX ADMIN — TICAGRELOR 90 MILLIGRAM(S): 90 TABLET ORAL at 03:09

## 2023-05-31 RX ADMIN — Medication 81 MILLIGRAM(S): at 08:41

## 2023-05-31 RX ADMIN — Medication 1: at 17:32

## 2023-05-31 RX ADMIN — HEPARIN SODIUM 650 UNIT(S)/HR: 5000 INJECTION INTRAVENOUS; SUBCUTANEOUS at 08:03

## 2023-05-31 RX ADMIN — Medication 50 MILLIGRAM(S): at 05:32

## 2023-05-31 RX ADMIN — Medication 1: at 21:52

## 2023-05-31 RX ADMIN — ATORVASTATIN CALCIUM 80 MILLIGRAM(S): 80 TABLET, FILM COATED ORAL at 21:53

## 2023-05-31 RX ADMIN — HEPARIN SODIUM 650 UNIT(S)/HR: 5000 INJECTION INTRAVENOUS; SUBCUTANEOUS at 21:53

## 2023-05-31 RX ADMIN — INSULIN GLARGINE 20 UNIT(S): 100 INJECTION, SOLUTION SUBCUTANEOUS at 21:52

## 2023-05-31 RX ADMIN — CHLORHEXIDINE GLUCONATE 1 APPLICATION(S): 213 SOLUTION TOPICAL at 12:22

## 2023-05-31 RX ADMIN — TICAGRELOR 90 MILLIGRAM(S): 90 TABLET ORAL at 16:06

## 2023-05-31 NOTE — PROGRESS NOTE ADULT - SUBJECTIVE AND OBJECTIVE BOX
Overnight events noted      VITAL:  T(C): , Max: 36.7 (23 @ 20:53)  T(F): , Max: 98.1 (23 @ 20:53)  HR: 89 (23 @ 08:32)  BP: 129/75 (23 @ 08:32)  BP(mean): --  RR: 18 (23 @ 08:32)  SpO2: 98% (23 @ 08:32)  Wt(kg): --      PHYSICAL EXAM:  Constitutional: NAD  HEENT: PERRLA, EOMI,  MMM  Neck: No LAD, No JVD  Respiratory: CTAB  Cardiovascular: S1 and S2  Gastrointestinal: BS+, soft, NT/ND  Extremities: No peripheral edema  Neurological: A/O x 3, no focal deficits  : No Earl  Skin: No rashes    LABS:                        13.2   10.08 )-----------( 316      ( 31 May 2023 06:48 )             39.2     Na(139)/K(3.8)/Cl(104)/HCO3(20)/BUN(30)/Cr(1.60)Glu(127)/Ca(9.7)/Mg(--)/PO4(--)     @ 06:48  Na(141)/K(4.1)/Cl(105)/HCO3(24)/BUN(28)/Cr(1.69)Glu(99)/Ca(9.7)/Mg(--)/PO4(--)     @ 05:51  Na(139)/K(3.7)/Cl(101)/HCO3(20)/BUN(29)/Cr(1.54)Glu(103)/Ca(9.2)/Mg(--)/PO4(--)     @ 09:01    Urinalysis Basic - ( 29 May 2023 17:16 )  Color: Yellow / Appearance: Clear / S.027 / pH: x  Gluc: x / Ketone: Negative  / Bili: Negative / Urobili: 4 mg/dL   Blood: x / Protein: Trace / Nitrite: Negative   Leuk Esterase: Negative / RBC: 1 /hpf / WBC 2 /HPF   Sq Epi: x / Non Sq Epi: x / Bacteria: Negative  Creatinine, Random Urine: 108 mg/dL ( @ 17:16)  Protein/Creatinine Ratio Calculation: 0.2 Ratio ( @ 17:16)  Sodium, Random Urine: 26 mmol/L ( @ 17:16)      IMPRESSION: 68M w/ HTN, DM2, AFib, CAD, and CKD, 23 p/w chest pain    (1)CKD - nonproteinuric CKD3a - due to hypertension/atherosclerosis  (2)BARRY - likely ATN from contrast nephropathy - mild/improving as of today  (3)Lytes - acceptable  (4)Cardiac - presented with chest pain, likely cardiac etiology. Given that the DENNIS is mild and is now improving, I believe it would be reasonable from renal standpoint to move forward with cardiac cath today    RECOMMEND:  (1)No objection to moving forward with cardiac cath today; can give periprocedural 1/2NS+75meq/L NaHCO3, 100cc/h x 5hours starting at least 1 hour prior to cath          Markell Walton MD  [facility]  Office/on call physician: [phone]  Cell (-7p): [phone]       no complaints      VITAL:  T(C): , Max: 36.7 (23 @ 20:53)  T(F): , Max: 98.1 (23 @ 20:53)  HR: 89 (23 @ 08:32)  BP: 129/75 (23 @ 08:32)  BP(mean): --  RR: 18 (23 @ 08:32)  SpO2: 98% (23 @ 08:32)  Wt(kg): --      PHYSICAL EXAM:  Constitutional: NAD  HEENT: PERRLA, EOMI,  MMM  Neck: No LAD, No JVD  Respiratory: CTAB  Cardiovascular: S1 and S2  Gastrointestinal: BS+, soft, NT/ND  Extremities: No peripheral edema  Neurological: A/O x 3, no focal deficits  : No Earl  Skin: No rashes    LABS:                        13.2   10.08 )-----------( 316      ( 31 May 2023 06:48 )             39.2     Na(139)/K(3.8)/Cl(104)/HCO3(20)/BUN(30)/Cr(1.60)Glu(127)/Ca(9.7)/Mg(--)/PO4(--)     @ 06:48  Na(141)/K(4.1)/Cl(105)/HCO3(24)/BUN(28)/Cr(1.69)Glu(99)/Ca(9.7)/Mg(--)/PO4(--)     @ 05:51  Na(139)/K(3.7)/Cl(101)/HCO3(20)/BUN(29)/Cr(1.54)Glu(103)/Ca(9.2)/Mg(--)/PO4(--)     @ 09:01    Urinalysis Basic - ( 29 May 2023 17:16 )  Color: Yellow / Appearance: Clear / S.027 / pH: x  Gluc: x / Ketone: Negative  / Bili: Negative / Urobili: 4 mg/dL   Blood: x / Protein: Trace / Nitrite: Negative   Leuk Esterase: Negative / RBC: 1 /hpf / WBC 2 /HPF   Sq Epi: x / Non Sq Epi: x / Bacteria: Negative  Creatinine, Random Urine: 108 mg/dL ( @ 17:16)  Protein/Creatinine Ratio Calculation: 0.2 Ratio ( @ 17:16)  Sodium, Random Urine: 26 mmol/L ( @ 17:16)      IMPRESSION: 68M w/ HTN, DM2, AFib, CAD, and CKD, 23 p/w chest pain    (1)CKD - nonproteinuric CKD3a - due to hypertension/atherosclerosis  (2)BARRY - likely ATN from contrast nephropathy - mild/improving as of today  (3)Lytes - acceptable  (4)Cardiac - presented with chest pain, likely cardiac etiology. Given that the DENNIS is mild and is now improving, I believe it would be reasonable from renal standpoint to move forward with cardiac cath today    RECOMMEND:  (1)No objection to moving forward with cardiac cath today; can give periprocedural 1/2NS+75meq/L NaHCO3, 100cc/h x 5hours starting at least 1 hour prior to cath          Markell Walton MD  [facility]  Office/on call physician: [phone]  Cell (-7p): [phone]

## 2023-05-31 NOTE — PROGRESS NOTE ADULT - SUBJECTIVE AND OBJECTIVE BOX
Date of service: 05-31-23 @ 18:56      Patient is a 68y old  Male who presents with a chief complaint of NSTEMI (31 May 2023 09:26)                                                               INTERVAL HPI/OVERNIGHT EVENTS:    REVIEW OF SYSTEMS:     CONSTITUTIONAL: No weakness, fevers or chills  RESPIRATORY: No cough, wheezing,  No shortness of breath  CARDIOVASCULAR: No chest pain or palpitations  GASTROINTESTINAL: No abdominal pain  . No nausea, vomiting, or hematemesis; No diarrhea or constipation. No melena or hematochezia.  GENITOURINARY: No dysuria, frequency or hematuria  NEUROLOGICAL: No numbness or weakness                                                                                                                                                                                                                                                                      Medications:  MEDICATIONS  (STANDING):  aspirin enteric coated 81 milliGRAM(s) Oral daily  atorvastatin 80 milliGRAM(s) Oral at bedtime  chlorhexidine 2% Cloths 1 Application(s) Topical daily  dextrose 5%. 1000 milliLiter(s) (100 mL/Hr) IV Continuous <Continuous>  dextrose 5%. 1000 milliLiter(s) (50 mL/Hr) IV Continuous <Continuous>  dextrose 50% Injectable 12.5 Gram(s) IV Push once  dextrose 50% Injectable 25 Gram(s) IV Push once  dextrose 50% Injectable 25 Gram(s) IV Push once  glucagon  Injectable 1 milliGRAM(s) IntraMuscular once  insulin glargine Injectable (LANTUS) 20 Unit(s) SubCutaneous at bedtime  insulin lispro (ADMELOG) corrective regimen sliding scale   SubCutaneous at bedtime  insulin lispro (ADMELOG) corrective regimen sliding scale   SubCutaneous three times a day before meals  metoprolol tartrate 50 milliGRAM(s) Oral every 12 hours  sodium bicarbonate  Infusion 0.061 mEq/kG/Hr (50 mL/Hr) IV Continuous <Continuous>  sodium chloride 0.45% 1000 milliLiter(s) (100 mL/Hr) IV Continuous <Continuous>  ticagrelor 90 milliGRAM(s) Oral every 12 hours    MEDICATIONS  (PRN):  acetaminophen     Tablet .. 650 milliGRAM(s) Oral every 6 hours PRN Temp greater or equal to 38C (100.4F), Mild Pain (1 - 3)  aluminum hydroxide/magnesium hydroxide/simethicone Suspension 30 milliLiter(s) Oral every 4 hours PRN Dyspepsia  dextrose Oral Gel 15 Gram(s) Oral once PRN Blood Glucose LESS THAN 70 milliGRAM(s)/deciliter  dextrose Oral Gel 15 Gram(s) Oral once PRN Blood Glucose LESS THAN 70 milliGRAM(s)/deciliter  melatonin 3 milliGRAM(s) Oral at bedtime PRN Insomnia  ondansetron Injectable 4 milliGRAM(s) IV Push every 8 hours PRN Nausea and/or Vomiting       Allergies    No Known Allergies    Intolerances      Vital Signs Last 24 Hrs  T(C): 36.7 (31 May 2023 12:13), Max: 36.7 (30 May 2023 20:53)  T(F): 98 (31 May 2023 12:13), Max: 98.1 (30 May 2023 20:53)  HR: 88 (31 May 2023 17:00) (77 - 98)  BP: 145/80 (31 May 2023 17:00) (122/79 - 171/90)  BP(mean): --  RR: 18 (31 May 2023 17:00) (14 - 20)  SpO2: 99% (31 May 2023 17:00) (97% - 100%)    Parameters below as of 31 May 2023 17:00  Patient On (Oxygen Delivery Method): room air      CAPILLARY BLOOD GLUCOSE      POCT Blood Glucose.: 184 mg/dL (31 May 2023 17:29)  POCT Blood Glucose.: 130 mg/dL (31 May 2023 12:27)  POCT Blood Glucose.: 139 mg/dL (31 May 2023 07:56)  POCT Blood Glucose.: 208 mg/dL (30 May 2023 21:27)      05-30 @ 07:01 - 05-31 @ 07:00  --------------------------------------------------------  IN: 480 mL / OUT: 1600 mL / NET: -1120 mL    05-31 @ 07:01  -  05-31 @ 18:56  --------------------------------------------------------  IN: 320 mL / OUT: 200 mL / NET: 120 mL      Physical Exam:    Daily Height in cm: 167.64 (31 May 2023 09:07)    Daily   General:  Well appearing, NAD, not cachetic  HEENT:  Nonicteric, PERRLA  CV:  RRR, S1S2   Lungs:  CTA B/L, no wheezes, rales, rhonchi  Abdomen:  Soft, non-tender, no distended, positive BS  Extremities:  2+ pulses, no c/c, no edema  Skin:  Warm and dry, no rashes  :  No moreno  Neuro:  AAOx3, non-focal, grossly intact                                                                                                                                                                                                                                                                                                LABS:                               13.2   10.08 )-----------( 316      ( 31 May 2023 06:48 )             39.2                      05-31    139  |  104  |  30<H>  ----------------------------<  127<H>  3.8   |  20<L>  |  1.60<H>    Ca    9.7      31 May 2023 06:48                         RADIOLOGY & ADDITIONAL TESTS         I personally reviewed: [  ]EKG   [  ]CXR    [  ] CT      A/P:         Discussed with :     Derek consultants' Notes   Time spent :

## 2023-05-31 NOTE — PROGRESS NOTE ADULT - SUBJECTIVE AND OBJECTIVE BOX
No new complaints  /88  HR 98  Irregular rhythm  Lungs clear  No edema    BUN 30  Crt 1.60  CBC  normal    Echo - EF 44%  inferior and inferolateral hypokinesis    Imp:  Day # 4 NSTEMI with peak Troponin 1405   No CHF  AF rate is well controlled  Undergoing Cath this AM  Rec:  Will need to start an ARB agent for GDMT as advised on 5/29/22 well as increasing the Metoprolol to 75 mg bid

## 2023-06-01 LAB
ANION GAP SERPL CALC-SCNC: 14 MMOL/L — SIGNIFICANT CHANGE UP (ref 5–17)
APTT BLD: 54.3 SEC — HIGH (ref 27.5–35.5)
APTT BLD: 64.4 SEC — HIGH (ref 27.5–35.5)
BUN SERPL-MCNC: 30 MG/DL — HIGH (ref 7–23)
CALCIUM SERPL-MCNC: 9.3 MG/DL — SIGNIFICANT CHANGE UP (ref 8.4–10.5)
CHLORIDE SERPL-SCNC: 104 MMOL/L — SIGNIFICANT CHANGE UP (ref 96–108)
CO2 SERPL-SCNC: 20 MMOL/L — LOW (ref 22–31)
CREAT SERPL-MCNC: 1.58 MG/DL — HIGH (ref 0.5–1.3)
EGFR: 48 ML/MIN/1.73M2 — LOW
GLUCOSE BLDC GLUCOMTR-MCNC: 125 MG/DL — HIGH (ref 70–99)
GLUCOSE BLDC GLUCOMTR-MCNC: 164 MG/DL — HIGH (ref 70–99)
GLUCOSE BLDC GLUCOMTR-MCNC: 194 MG/DL — HIGH (ref 70–99)
GLUCOSE BLDC GLUCOMTR-MCNC: 312 MG/DL — HIGH (ref 70–99)
GLUCOSE SERPL-MCNC: 145 MG/DL — HIGH (ref 70–99)
HCT VFR BLD CALC: 35.8 % — LOW (ref 39–50)
HGB BLD-MCNC: 12.1 G/DL — LOW (ref 13–17)
MCHC RBC-ENTMCNC: 30.8 PG — SIGNIFICANT CHANGE UP (ref 27–34)
MCHC RBC-ENTMCNC: 33.8 GM/DL — SIGNIFICANT CHANGE UP (ref 32–36)
MCV RBC AUTO: 91.1 FL — SIGNIFICANT CHANGE UP (ref 80–100)
NRBC # BLD: 0 /100 WBCS — SIGNIFICANT CHANGE UP (ref 0–0)
PLATELET # BLD AUTO: 300 K/UL — SIGNIFICANT CHANGE UP (ref 150–400)
POTASSIUM SERPL-MCNC: 4 MMOL/L — SIGNIFICANT CHANGE UP (ref 3.5–5.3)
POTASSIUM SERPL-SCNC: 4 MMOL/L — SIGNIFICANT CHANGE UP (ref 3.5–5.3)
RBC # BLD: 3.93 M/UL — LOW (ref 4.2–5.8)
RBC # FLD: 14.1 % — SIGNIFICANT CHANGE UP (ref 10.3–14.5)
SODIUM SERPL-SCNC: 138 MMOL/L — SIGNIFICANT CHANGE UP (ref 135–145)
WBC # BLD: 10.5 K/UL — SIGNIFICANT CHANGE UP (ref 3.8–10.5)
WBC # FLD AUTO: 10.5 K/UL — SIGNIFICANT CHANGE UP (ref 3.8–10.5)

## 2023-06-01 RX ORDER — SODIUM CHLORIDE 9 MG/ML
1000 INJECTION, SOLUTION INTRAVENOUS
Refills: 0 | Status: DISCONTINUED | OUTPATIENT
Start: 2023-06-01 | End: 2023-06-04

## 2023-06-01 RX ADMIN — HEPARIN SODIUM 650 UNIT(S)/HR: 5000 INJECTION INTRAVENOUS; SUBCUTANEOUS at 07:25

## 2023-06-01 RX ADMIN — HEPARIN SODIUM 650 UNIT(S)/HR: 5000 INJECTION INTRAVENOUS; SUBCUTANEOUS at 19:23

## 2023-06-01 RX ADMIN — HEPARIN SODIUM 650 UNIT(S)/HR: 5000 INJECTION INTRAVENOUS; SUBCUTANEOUS at 11:31

## 2023-06-01 RX ADMIN — Medication 50 MILLIGRAM(S): at 05:29

## 2023-06-01 RX ADMIN — HEPARIN SODIUM 650 UNIT(S)/HR: 5000 INJECTION INTRAVENOUS; SUBCUTANEOUS at 04:20

## 2023-06-01 RX ADMIN — Medication 4: at 11:32

## 2023-06-01 RX ADMIN — TICAGRELOR 90 MILLIGRAM(S): 90 TABLET ORAL at 03:41

## 2023-06-01 RX ADMIN — CHLORHEXIDINE GLUCONATE 1 APPLICATION(S): 213 SOLUTION TOPICAL at 11:32

## 2023-06-01 RX ADMIN — Medication 1: at 08:04

## 2023-06-01 RX ADMIN — Medication 50 MILLIGRAM(S): at 17:32

## 2023-06-01 RX ADMIN — Medication 81 MILLIGRAM(S): at 11:32

## 2023-06-01 RX ADMIN — TICAGRELOR 90 MILLIGRAM(S): 90 TABLET ORAL at 16:00

## 2023-06-01 RX ADMIN — INSULIN GLARGINE 20 UNIT(S): 100 INJECTION, SOLUTION SUBCUTANEOUS at 21:36

## 2023-06-01 RX ADMIN — ATORVASTATIN CALCIUM 80 MILLIGRAM(S): 80 TABLET, FILM COATED ORAL at 21:13

## 2023-06-01 NOTE — PROGRESS NOTE ADULT - SUBJECTIVE AND OBJECTIVE BOX
Date of service: 23 @ 23:51      Patient is a 68y old  Male who presents with a chief complaint of CP (2023 10:25)                                                               INTERVAL HPI/OVERNIGHT EVENTS:    REVIEW OF SYSTEMS:     CONSTITUTIONAL: No weakness, fevers or chills  EYES/ENT: No visual changes , no ear ache   NECK: No pain or stiffness  RESPIRATORY: No cough, wheezing,  No shortness of breath  CARDIOVASCULAR: No chest pain or palpitations  GASTROINTESTINAL: No abdominal pain  . No nausea, vomiting, or hematemesis; No diarrhea or constipation. No melena or hematochezia.  GENITOURINARY: No dysuria, frequency or hematuria  NEUROLOGICAL: No numbness or weakness  SKIN: No itching, burning, rashes, or lesions                                                                                                                                                                                                                                                                                 Medications:  MEDICATIONS  (STANDING):  aspirin enteric coated 81 milliGRAM(s) Oral daily  atorvastatin 80 milliGRAM(s) Oral at bedtime  chlorhexidine 2% Cloths 1 Application(s) Topical daily  dextrose 5%. 1000 milliLiter(s) (100 mL/Hr) IV Continuous <Continuous>  dextrose 5%. 1000 milliLiter(s) (50 mL/Hr) IV Continuous <Continuous>  dextrose 50% Injectable 25 Gram(s) IV Push once  dextrose 50% Injectable 25 Gram(s) IV Push once  dextrose 50% Injectable 12.5 Gram(s) IV Push once  glucagon  Injectable 1 milliGRAM(s) IntraMuscular once  heparin  Infusion. 650 Unit(s)/Hr (6.5 mL/Hr) IV Continuous <Continuous>  insulin glargine Injectable (LANTUS) 20 Unit(s) SubCutaneous at bedtime  insulin lispro (ADMELOG) corrective regimen sliding scale   SubCutaneous three times a day before meals  insulin lispro (ADMELOG) corrective regimen sliding scale   SubCutaneous at bedtime  metoprolol tartrate 50 milliGRAM(s) Oral every 12 hours  sodium bicarbonate  Infusion 0.061 mEq/kG/Hr (50 mL/Hr) IV Continuous <Continuous>  sodium chloride 0.45% 1000 milliLiter(s) (100 mL/Hr) IV Continuous <Continuous>  ticagrelor 90 milliGRAM(s) Oral every 12 hours    MEDICATIONS  (PRN):  acetaminophen     Tablet .. 650 milliGRAM(s) Oral every 6 hours PRN Temp greater or equal to 38C (100.4F), Mild Pain (1 - 3)  aluminum hydroxide/magnesium hydroxide/simethicone Suspension 30 milliLiter(s) Oral every 4 hours PRN Dyspepsia  dextrose Oral Gel 15 Gram(s) Oral once PRN Blood Glucose LESS THAN 70 milliGRAM(s)/deciliter  dextrose Oral Gel 15 Gram(s) Oral once PRN Blood Glucose LESS THAN 70 milliGRAM(s)/deciliter  heparin   Injectable 3500 Unit(s) IV Push every 6 hours PRN For aPTT less than 40  melatonin 3 milliGRAM(s) Oral at bedtime PRN Insomnia  ondansetron Injectable 4 milliGRAM(s) IV Push every 8 hours PRN Nausea and/or Vomiting       Allergies    No Known Allergies    Intolerances      Vital Signs Last 24 Hrs  T(C): 36.6 (2023 17:29), Max: 36.6 (2023 08:34)  T(F): 97.9 (:29), Max: 97.9 (2023 08:34)  HR: 76 (:) (74 - 97)  BP: 137/74 (2023 17:29) (118/55 - 163/80)  BP(mean): --  RR: 18 (2023 17:29) (18 - 18)  SpO2: 99% (2023 17:29) (96% - 100%)    Parameters below as of 2023 17:29  Patient On (Oxygen Delivery Method): room air      CAPILLARY BLOOD GLUCOSE      POCT Blood Glucose.: 194 mg/dL (2023 21:11)  POCT Blood Glucose.: 125 mg/dL (2023 17:02)  POCT Blood Glucose.: 312 mg/dL (2023 11:29)  POCT Blood Glucose.: 164 mg/dL (2023 08:00)      05-31 @ 07:  -   @ 07:00  --------------------------------------------------------  IN: 320 mL / OUT: 500 mL / NET: -180 mL     @ 07: @ 23:51  --------------------------------------------------------  IN: 870 mL / OUT: 1000 mL / NET: -130 mL      Physical Exam:    Daily     Daily Weight in k.8 (2023 08:00)  General:  Well appearing, NAD, not cachetic  HEENT:  Nonicteric, PERRLA  CV:  RRR, S1S2   Lungs:  CTA B/L, no wheezes, rales, rhonchi  Abdomen:  Soft, non-tender, no distended, positive BS  Extremities:  2+ pulses, no c/c, no edema  Skin:  Warm and dry, no rashes  :  No moreno  Neuro:  AAOx3, non-focal, grossly intact                                                                                                                                                                                                                                                                                                LABS:                               12.1   10.50 )-----------( 300      ( 2023 03:54 )             35.8                      06-01    138  |  104  |  30<H>  ----------------------------<  145<H>  4.0   |  20<L>  |  1.58<H>    Ca    9.3      2023 03:54                         RADIOLOGY & ADDITIONAL TESTS         I personally reviewed: [  ]EKG   [  ]CXR    [  ] CT      A/P:         Discussed with :     Derek consultants' Notes   Time spent :

## 2023-06-01 NOTE — PROGRESS NOTE ADULT - SUBJECTIVE AND OBJECTIVE BOX
Overnight events noted      VITAL:  T(C): , Max: 36.7 (05-31-23 @ 08:32)  T(F): , Max: 98 (05-31-23 @ 08:32)  HR: 97 (06-01-23 @ 05:28)  BP: 163/80 (06-01-23 @ 05:28)  BP(mean): --  RR: 18 (06-01-23 @ 04:30)  SpO2: 100% (06-01-23 @ 04:30)  Wt(kg): --      PHYSICAL EXAM:  Constitutional: NAD  HEENT: PERRLA, EOMI,  MMM  Neck: No LAD, No JVD  Respiratory: CTAB  Cardiovascular: S1 and S2  Gastrointestinal: BS+, soft, NT/ND  Extremities: No peripheral edema  Neurological: A/O x 3, no focal deficits  : No Earl  Skin: No rashes    LABS:                        12.1   10.50 )-----------( 300      ( 01 Jun 2023 03:54 )             35.8     Na(138)/K(4.0)/Cl(104)/HCO3(20)/BUN(30)/Cr(1.58)Glu(145)/Ca(9.3)/Mg(--)/PO4(--)    06-01 @ 03:54  Na(139)/K(3.8)/Cl(104)/HCO3(20)/BUN(30)/Cr(1.60)Glu(127)/Ca(9.7)/Mg(--)/PO4(--)    05-31 @ 06:48  Na(141)/K(4.1)/Cl(105)/HCO3(24)/BUN(28)/Cr(1.69)Glu(99)/Ca(9.7)/Mg(--)/PO4(--)    05-30 @ 05:51  Na(139)/K(3.7)/Cl(101)/HCO3(20)/BUN(29)/Cr(1.54)Glu(103)/Ca(9.2)/Mg(--)/PO4(--)    05-29 @ 09:01      IMPRESSION: 68M w/ HTN, DM2, AFib, CAD, and CKD, 5/28/23 p/w chest pain    (1)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 No evidence as of this a.m. of further ATN s/p diagnostic cath yesterday    (3)Lytes - acceptable    (4)Cardiac - significant left main disease - potentially for PCI tomorrow      RECOMMEND:  (1)No objection to PCI tomorrow if creatinine remains <1.8 as of tomorrow; if for cath tomorrow, would treat periprocedurally again with 1/2NS+75meq/L NaHCO3, 100cc/h x 5h starting 1h prior to cath            Markell Walton MD  [facility]  Office/on call physician: [phone]  Cell (7a-7p): [phone]       No pain, no sob  Voiding well      VITAL:  T(C): , Max: 36.7 (05-31-23 @ 08:32)  T(F): , Max: 98 (05-31-23 @ 08:32)  HR: 97 (06-01-23 @ 05:28)  BP: 163/80 (06-01-23 @ 05:28)  BP(mean): --  RR: 18 (06-01-23 @ 04:30)  SpO2: 100% (06-01-23 @ 04:30)  Wt(kg): --      PHYSICAL EXAM:  Constitutional: NAD  HEENT: PERRLA, EOMI,  MMM  Neck: No LAD, No JVD  Respiratory: CTAB  Cardiovascular: S1 and S2  Gastrointestinal: BS+, soft, NT/ND  Extremities: No peripheral edema  Neurological: A/O x 3, no focal deficits  : No Earl  Skin: No rashes    LABS:                        12.1   10.50 )-----------( 300      ( 01 Jun 2023 03:54 )             35.8     Na(138)/K(4.0)/Cl(104)/HCO3(20)/BUN(30)/Cr(1.58)Glu(145)/Ca(9.3)/Mg(--)/PO4(--)    06-01 @ 03:54  Na(139)/K(3.8)/Cl(104)/HCO3(20)/BUN(30)/Cr(1.60)Glu(127)/Ca(9.7)/Mg(--)/PO4(--)    05-31 @ 06:48  Na(141)/K(4.1)/Cl(105)/HCO3(24)/BUN(28)/Cr(1.69)Glu(99)/Ca(9.7)/Mg(--)/PO4(--)    05-30 @ 05:51  Na(139)/K(3.7)/Cl(101)/HCO3(20)/BUN(29)/Cr(1.54)Glu(103)/Ca(9.2)/Mg(--)/PO4(--)    05-29 @ 09:01      IMPRESSION: 68M w/ HTN, DM2, AFib, CAD, and CKD, 5/28/23 p/w chest pain    (1)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 No evidence as of this a.m. of further ATN s/p diagnostic cath yesterday    (3)Lytes - acceptable    (4)Cardiac - significant left main disease - potentially for PCI tomorrow      RECOMMEND:  (1)No objection to PCI tomorrow if creatinine remains <1.8 as of tomorrow; if for cath tomorrow, would treat periprocedurally again with 1/2NS+75meq/L NaHCO3, 100cc/h x 5h starting 1h prior to cath            Markell Walton MD  [facility]  Office/on call physician: [phone]  Cell (7a-7p): [phone]

## 2023-06-01 NOTE — PROGRESS NOTE ADULT - SUBJECTIVE AND OBJECTIVE BOX
SUBJECTIVE:  NO CP no SOB. IN good spirits.   	  MEDICATIONS:  metoprolol tartrate 50 milliGRAM(s) Oral every 12 hours        acetaminophen     Tablet .. 650 milliGRAM(s) Oral every 6 hours PRN  melatonin 3 milliGRAM(s) Oral at bedtime PRN  ondansetron Injectable 4 milliGRAM(s) IV Push every 8 hours PRN    aluminum hydroxide/magnesium hydroxide/simethicone Suspension 30 milliLiter(s) Oral every 4 hours PRN    atorvastatin 80 milliGRAM(s) Oral at bedtime  dextrose 50% Injectable 25 Gram(s) IV Push once  dextrose 50% Injectable 12.5 Gram(s) IV Push once  dextrose 50% Injectable 25 Gram(s) IV Push once  dextrose Oral Gel 15 Gram(s) Oral once PRN  dextrose Oral Gel 15 Gram(s) Oral once PRN  glucagon  Injectable 1 milliGRAM(s) IntraMuscular once  insulin glargine Injectable (LANTUS) 20 Unit(s) SubCutaneous at bedtime  insulin lispro (ADMELOG) corrective regimen sliding scale   SubCutaneous three times a day before meals  insulin lispro (ADMELOG) corrective regimen sliding scale   SubCutaneous at bedtime    aspirin enteric coated 81 milliGRAM(s) Oral daily  chlorhexidine 2% Cloths 1 Application(s) Topical daily  dextrose 5%. 1000 milliLiter(s) IV Continuous <Continuous>  dextrose 5%. 1000 milliLiter(s) IV Continuous <Continuous>  heparin   Injectable 3500 Unit(s) IV Push every 6 hours PRN  heparin  Infusion. 650 Unit(s)/Hr IV Continuous <Continuous>  sodium bicarbonate  Infusion 0.061 mEq/kG/Hr IV Continuous <Continuous>  sodium chloride 0.45% 1000 milliLiter(s) IV Continuous <Continuous>  ticagrelor 90 milliGRAM(s) Oral every 12 hours      REVIEW OF SYSTEMS:    CONSTITUTIONAL: No fever, weight loss, or fatigue  EYES: No eye pain, visual disturbances, or discharge  NECK: No pain or stiffness  RESPIRATORY: No cough, wheezing, chills or hemoptysis; No Shortness of Breath  CARDIOVASCULAR: No chest pain, palpitations, dizziness, or leg swelling  GASTROINTESTINAL: No abdominal or epigastric pain. No nausea, vomiting, or hematemesis; No diarrhea or constipation. No melena or hematochezia.  GENITOURINARY: No dysuria, frequency, hematuria, or incontinence  NEUROLOGICAL: No headaches, memory loss, loss of strength, numbness, or tremors  SKIN: No itching, burning, rashes, or lesions   LYMPH Nodes: No enlarged glands  MUSCULOSKELETAL: No joint pain or swelling; No muscle, back, or extremity pain  All other review of systems are negative.  	  [ ] Unable to obtain    PHYSICAL EXAM:  T(C): 36.6 (06-01-23 @ 08:34), Max: 36.7 (05-31-23 @ 12:13)  HR: 80 (06-01-23 @ 08:34) (77 - 97)  BP: 118/55 (06-01-23 @ 08:34) (118/55 - 171/90)  RR: 18 (06-01-23 @ 08:34) (14 - 20)  SpO2: 98% (06-01-23 @ 08:34) (96% - 100%)  Wt(kg): --  I&O's Summary    31 May 2023 07:01  -  01 Jun 2023 07:00  --------------------------------------------------------  IN: 320 mL / OUT: 500 mL / NET: -180 mL          PHYSICAL EXAM    Appearance: Normal	  HEENT:   Normal oral mucosa, PERRL, EOMI	  NECK: Soft and supple, No LAD, No JVD  Cardiovascular: Regular Rate and Rhythm, Normal S1 S2, No murmurs, No clicks, gallops or rubs  Respiratory: Lungs clear to auscultation	  Gastrointestinal:  Soft, Non-tender, + BS	  Skin: No rashes, No ecchymoses, No cyanosis  Neurologic: Non-focal  Extremities: No clubbing, cyanosis or edema  Vascular: Peripheral pulses palpable 2+ bilaterally    TELEMETRY: 	AF 80 - 100        LABS:	 	                         12.1   10.50 )-----------( 300      ( 01 Jun 2023 03:54 )             35.8     06-01    138  |  104  |  30<H>  ----------------------------<  145<H>  4.0   |  20<L>  |  1.58<H>    Ca    9.3      01 Jun 2023 03:54    Coronary Angiography   LM   Left main artery: There is dampening upon engagement of the left main  coronary artery.. There is a 60 % stenosis in the ostium  portion of the segment.      LAD   Proximal left anterior descending: There is a 25 % in-stent  restenosis. Mid left anterior descending: There is a 25 % in-stent  restenosis. First diagonal: There is a 70 % stenosis in the ostium  portion of the segment. First diagonal: There is a 40 %  in-stent restenosis. Distal left anterior descending: There is a 65 %  stenosis.    CX   Proximal circumflex: There is a 40 % in-stent restenosis.      RCA   Proximal right coronary artery: There is a 35 % stenosis. Mid right  coronary artery: There is a 40 % stenosis. Right posterior  descending artery: There is a 75 % stenosis in the ostium portion of  the segment. The posterior descending artery is small less  than 2mm in size.

## 2023-06-01 NOTE — PROGRESS NOTE ADULT - ASSESSMENT
67 y/o WM h/o HTN HLD, CAD s/p CPI  to LAD and recent PCI LCX 3/27/23 presents with CP and s/p NSTEMI  CATH 5/31 --> 60% pLM, 25% ISR LAD, 25% ISR mid. 70% D1 65%dLAD 40% pLCX ISR, 35% mRCA, 75% pPDA  Echo - EF 44%  inferior and inferolateral hypokinesis  Day #5 Post NSTEMI  - cont max dose Statin post MI  -cont DAPT  - Heparin gtt  - Consider Endo consult - A1C 9.5 in office PTA  - cont BB  - add ARB   - for LM PCI tomorrow. D/w pt and wife all of the above. R/B/A of LM stent discussed including but not limited to bleeding, CVA, MI, death. Pt and wife verbalize a full understanding of this and agree to proceed.  69 y/o WM h/o HTN HLD, CAD s/p CPI  to LAD and recent PCI LCX 3/27/23 presents with CP and s/p NSTEMI  CATH 5/31 --> 60% pLM, 25% ISR LAD, 25% ISR mid. 70% D1 65%dLAD 40% pLCX ISR, 35% mRCA, 75% pPDA  Echo - EF 44%  inferior and inferolateral hypokinesis  Day #5 Post NSTEMI  - cont max dose Statin post MI  -cont DAPT  - Heparin gtt  - Consider Endo consult - A1C 9.5 in office PTA  - cont BB  - add ARB   - for LM PCI tomorrow. D/w pt and wife all of the above. R/B/A of LM stent discussed including but not limited to bleeding, CVA, MI, death. Pt and wife verbalize a full understanding of this and agree to proceed.   - I have also discussed the interventional approach thoroughly with Dr Sethi. He had asked Dr Nunn ([facility]) to review the films regarding feasibility for CABG and he declined due to poor targets for grafting. PT and wife informed of this as well.

## 2023-06-02 LAB
ANION GAP SERPL CALC-SCNC: 14 MMOL/L — SIGNIFICANT CHANGE UP (ref 5–17)
APTT BLD: 45.1 SEC — HIGH (ref 27.5–35.5)
BUN SERPL-MCNC: 28 MG/DL — HIGH (ref 7–23)
CALCIUM SERPL-MCNC: 9.8 MG/DL — SIGNIFICANT CHANGE UP (ref 8.4–10.5)
CHLORIDE SERPL-SCNC: 105 MMOL/L — SIGNIFICANT CHANGE UP (ref 96–108)
CO2 SERPL-SCNC: 19 MMOL/L — LOW (ref 22–31)
CREAT SERPL-MCNC: 1.43 MG/DL — HIGH (ref 0.5–1.3)
EGFR: 54 ML/MIN/1.73M2 — LOW
GLUCOSE BLDC GLUCOMTR-MCNC: 146 MG/DL — HIGH (ref 70–99)
GLUCOSE BLDC GLUCOMTR-MCNC: 146 MG/DL — HIGH (ref 70–99)
GLUCOSE BLDC GLUCOMTR-MCNC: 162 MG/DL — HIGH (ref 70–99)
GLUCOSE BLDC GLUCOMTR-MCNC: 210 MG/DL — HIGH (ref 70–99)
GLUCOSE BLDC GLUCOMTR-MCNC: 212 MG/DL — HIGH (ref 70–99)
GLUCOSE BLDC GLUCOMTR-MCNC: 311 MG/DL — HIGH (ref 70–99)
GLUCOSE SERPL-MCNC: 137 MG/DL — HIGH (ref 70–99)
HCT VFR BLD CALC: 38.4 % — LOW (ref 39–50)
HGB BLD-MCNC: 13 G/DL — SIGNIFICANT CHANGE UP (ref 13–17)
MCHC RBC-ENTMCNC: 31.2 PG — SIGNIFICANT CHANGE UP (ref 27–34)
MCHC RBC-ENTMCNC: 33.9 GM/DL — SIGNIFICANT CHANGE UP (ref 32–36)
MCV RBC AUTO: 92.1 FL — SIGNIFICANT CHANGE UP (ref 80–100)
NRBC # BLD: 0 /100 WBCS — SIGNIFICANT CHANGE UP (ref 0–0)
PLATELET # BLD AUTO: 287 K/UL — SIGNIFICANT CHANGE UP (ref 150–400)
POTASSIUM SERPL-MCNC: 4 MMOL/L — SIGNIFICANT CHANGE UP (ref 3.5–5.3)
POTASSIUM SERPL-SCNC: 4 MMOL/L — SIGNIFICANT CHANGE UP (ref 3.5–5.3)
RBC # BLD: 4.17 M/UL — LOW (ref 4.2–5.8)
RBC # FLD: 14 % — SIGNIFICANT CHANGE UP (ref 10.3–14.5)
SODIUM SERPL-SCNC: 138 MMOL/L — SIGNIFICANT CHANGE UP (ref 135–145)
WBC # BLD: 10.04 K/UL — SIGNIFICANT CHANGE UP (ref 3.8–10.5)
WBC # FLD AUTO: 10.04 K/UL — SIGNIFICANT CHANGE UP (ref 3.8–10.5)

## 2023-06-02 PROCEDURE — 92928 PRQ TCAT PLMT NTRAC ST 1 LES: CPT | Mod: LM

## 2023-06-02 PROCEDURE — 99152 MOD SED SAME PHYS/QHP 5/>YRS: CPT

## 2023-06-02 PROCEDURE — 93010 ELECTROCARDIOGRAM REPORT: CPT

## 2023-06-02 RX ORDER — TICAGRELOR 90 MG/1
1 TABLET ORAL
Qty: 60 | Refills: 0
Start: 2023-06-02 | End: 2023-07-01

## 2023-06-02 RX ADMIN — CHLORHEXIDINE GLUCONATE 1 APPLICATION(S): 213 SOLUTION TOPICAL at 18:23

## 2023-06-02 RX ADMIN — TICAGRELOR 90 MILLIGRAM(S): 90 TABLET ORAL at 02:35

## 2023-06-02 RX ADMIN — INSULIN GLARGINE 20 UNIT(S): 100 INJECTION, SOLUTION SUBCUTANEOUS at 22:58

## 2023-06-02 RX ADMIN — ATORVASTATIN CALCIUM 80 MILLIGRAM(S): 80 TABLET, FILM COATED ORAL at 21:07

## 2023-06-02 RX ADMIN — HEPARIN SODIUM 750 UNIT(S)/HR: 5000 INJECTION INTRAVENOUS; SUBCUTANEOUS at 07:50

## 2023-06-02 RX ADMIN — Medication 50 MILLIGRAM(S): at 05:54

## 2023-06-02 RX ADMIN — HEPARIN SODIUM 650 UNIT(S)/HR: 5000 INJECTION INTRAVENOUS; SUBCUTANEOUS at 07:35

## 2023-06-02 RX ADMIN — Medication 81 MILLIGRAM(S): at 09:58

## 2023-06-02 RX ADMIN — Medication 50 MILLIGRAM(S): at 18:23

## 2023-06-02 RX ADMIN — TICAGRELOR 90 MILLIGRAM(S): 90 TABLET ORAL at 14:25

## 2023-06-02 RX ADMIN — Medication 1: at 13:52

## 2023-06-02 NOTE — PROGRESS NOTE ADULT - SUBJECTIVE AND OBJECTIVE BOX
Overnight events noted      VITAL:  T(C): , Max: 36.7 (06-02-23 @ 09:57)  T(F): , Max: 98 (06-02-23 @ 09:57)  HR: 70 (06-02-23 @ 10:14)  BP: 127/64 (06-02-23 @ 10:14)  BP(mean): --  RR: 18 (06-02-23 @ 10:14)  SpO2: 98% (06-02-23 @ 10:14)  Wt(kg): --      PHYSICAL EXAM:  Constitutional: NAD  HEENT: PERRLA, EOMI,  MMM  Neck: No LAD, No JVD  Respiratory: CTAB  Cardiovascular: S1 and S2  Gastrointestinal: BS+, soft, NT/ND  Extremities: No peripheral edema  Neurological: A/O x 3, no focal deficits  : No Earl  Skin: No rashes    LABS:                        13.0   10.04 )-----------( 287      ( 02 Jun 2023 06:40 )             38.4     Na(138)/K(4.0)/Cl(105)/HCO3(19)/BUN(28)/Cr(1.43)Glu(137)/Ca(9.8)/Mg(--)/PO4(--)    06-02 @ 06:40  Na(138)/K(4.0)/Cl(104)/HCO3(20)/BUN(30)/Cr(1.58)Glu(145)/Ca(9.3)/Mg(--)/PO4(--)    06-01 @ 03:54  Na(139)/K(3.8)/Cl(104)/HCO3(20)/BUN(30)/Cr(1.60)Glu(127)/Ca(9.7)/Mg(--)/PO4(--)    05-31 @ 06:48      IMPRESSION: 68M w/ HTN, DM2, AFib, CAD, and CKD, 5/28/23 p/w chest pain    (1)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 No evidence as to date of further ATN s/p diagnostic cath 5/31/23    (3)Lytes - acceptable    (4)Cardiac - significant left main disease - potentially for PCI today      RECOMMEND:  (1)No objection to PCI today; treat periprocedurally again with 1/2NS+75meq/L NaHCO3, 100cc/h x 5h starting 1h prior to cath              Markell Walton MD  [facility]  Office/on call physician: [phone]  Cell (7a-7p): [phone]       Overnight events noted      VITAL:  T(C): , Max: 36.7 (06-02-23 @ 09:57)  T(F): , Max: 98 (06-02-23 @ 09:57)  HR: 70 (06-02-23 @ 10:14)  BP: 127/64 (06-02-23 @ 10:14)  BP(mean): --  RR: 18 (06-02-23 @ 10:14)  SpO2: 98% (06-02-23 @ 10:14)  Wt(kg): --      PHYSICAL EXAM:  Constitutional: NAD  HEENT: PERRLA, EOMI,  MMM  Neck: No LAD, No JVD  Respiratory: CTAB  Cardiovascular: S1 and S2  Gastrointestinal: BS+, soft, NT/ND  Extremities: No peripheral edema  Neurological: A/O x 3, no focal deficits  : No Earl  Skin: No rashes    LABS:                        13.0   10.04 )-----------( 287      ( 02 Jun 2023 06:40 )             38.4     Na(138)/K(4.0)/Cl(105)/HCO3(19)/BUN(28)/Cr(1.43)Glu(137)/Ca(9.8)/Mg(--)/PO4(--)    06-02 @ 06:40  Na(138)/K(4.0)/Cl(104)/HCO3(20)/BUN(30)/Cr(1.58)Glu(145)/Ca(9.3)/Mg(--)/PO4(--)    06-01 @ 03:54  Na(139)/K(3.8)/Cl(104)/HCO3(20)/BUN(30)/Cr(1.60)Glu(127)/Ca(9.7)/Mg(--)/PO4(--)    05-31 @ 06:48      IMPRESSION: 68M w/ HTN, DM2, AFib, CAD, and CKD, 5/28/23 p/w chest pain    (1)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 No evidence as to date of further ATN s/p diagnostic cath 5/31/23    (3)Lytes - acceptable    (4)Cardiac - significant left main disease - potentially for PCI today      RECOMMEND:  (1)No objection to PCI today; treat periprocedurally again with 1/2NS+75meq/L NaHCO3, 100cc/h x 5h starting 1h prior to cath  (2)No objection to discharge in a.m. if creatinine <1.6; if for discharge, would repeat BMP in 1 week; would plan for renal followup in 1-4 weeks            Markell Walton MD  [facility]  Office/on call physician: [phone]  Cell (7a-7p): [phone]       no complaints      VITAL:  T(C): , Max: 36.7 (06-02-23 @ 09:57)  T(F): , Max: 98 (06-02-23 @ 09:57)  HR: 70 (06-02-23 @ 10:14)  BP: 127/64 (06-02-23 @ 10:14)  BP(mean): --  RR: 18 (06-02-23 @ 10:14)  SpO2: 98% (06-02-23 @ 10:14)  Wt(kg): --      PHYSICAL EXAM:  Constitutional: NAD  HEENT: PERRLA, EOMI,  MMM  Neck: No LAD, No JVD  Respiratory: CTAB  Cardiovascular: S1 and S2  Gastrointestinal: BS+, soft, NT/ND  Extremities: No peripheral edema  Neurological: A/O x 3, no focal deficits  : No Earl  Skin: No rashes    LABS:                        13.0   10.04 )-----------( 287      ( 02 Jun 2023 06:40 )             38.4     Na(138)/K(4.0)/Cl(105)/HCO3(19)/BUN(28)/Cr(1.43)Glu(137)/Ca(9.8)/Mg(--)/PO4(--)    06-02 @ 06:40  Na(138)/K(4.0)/Cl(104)/HCO3(20)/BUN(30)/Cr(1.58)Glu(145)/Ca(9.3)/Mg(--)/PO4(--)    06-01 @ 03:54  Na(139)/K(3.8)/Cl(104)/HCO3(20)/BUN(30)/Cr(1.60)Glu(127)/Ca(9.7)/Mg(--)/PO4(--)    05-31 @ 06:48      IMPRESSION: 68M w/ HTN, DM2, AFib, CAD, and CKD, 5/28/23 p/w chest pain    (1)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 No evidence as to date of further ATN s/p diagnostic cath 5/31/23    (3)Lytes - acceptable    (4)Cardiac - significant left main disease - potentially for PCI today      RECOMMEND:  (1)No objection to PCI today; treat periprocedurally again with 1/2NS+75meq/L NaHCO3, 100cc/h x 5h starting 1h prior to cath  (2)No objection to discharge in a.m. if creatinine <1.6; if for discharge, would repeat BMP in 1 week; would plan for renal followup in 1-4 weeks            Markell Walton MD  [facility]  Office/on call physician: [phone]  Cell (7a-7p): [phone]

## 2023-06-02 NOTE — PHARMACOTHERAPY INTERVENTION NOTE - COMMENTS
Prior authorization approved for Brilinta 90mg (#60; 30-day supply).    Pharmacy - [facility]  Prior auth status - Approved for 6 months, then primary needs to reapply for new prior auth.  Cost - $5/mo with  coupon.     Christophe Garcia (Jian Ming)  Pharmacist  Available on [employer] Teams

## 2023-06-02 NOTE — PROGRESS NOTE ADULT - SUBJECTIVE AND OBJECTIVE BOX
Date of service: 23 @ 14:09      Patient is a 68y old  Male who presents with a chief complaint of CP (2023 12:41)                                                               INTERVAL HPI/OVERNIGHT EVENTS:    REVIEW OF SYSTEMS:     CONSTITUTIONAL: No weakness, fevers or chills  EYES/ENT: No visual changes , no ear ache   NECK: No pain or stiffness  RESPIRATORY: No cough, wheezing,  No shortness of breath  CARDIOVASCULAR: No chest pain or palpitations  GASTROINTESTINAL: No abdominal pain  . No nausea, vomiting, or hematemesis; No diarrhea or constipation. No melena or hematochezia.  GENITOURINARY: No dysuria, frequency or hematuria  NEUROLOGICAL: No numbness or weakness  SKIN: No itching, burning, rashes, or lesions                                                                                                                                                                                                                                                                                 Medications:  MEDICATIONS  (STANDING):  aspirin enteric coated 81 milliGRAM(s) Oral daily  atorvastatin 80 milliGRAM(s) Oral at bedtime  chlorhexidine 2% Cloths 1 Application(s) Topical daily  dextrose 5%. 1000 milliLiter(s) (100 mL/Hr) IV Continuous <Continuous>  dextrose 5%. 1000 milliLiter(s) (50 mL/Hr) IV Continuous <Continuous>  dextrose 50% Injectable 12.5 Gram(s) IV Push once  dextrose 50% Injectable 25 Gram(s) IV Push once  dextrose 50% Injectable 25 Gram(s) IV Push once  glucagon  Injectable 1 milliGRAM(s) IntraMuscular once  heparin  Infusion. 650 Unit(s)/Hr (6.5 mL/Hr) IV Continuous <Continuous>  insulin glargine Injectable (LANTUS) 20 Unit(s) SubCutaneous at bedtime  insulin lispro (ADMELOG) corrective regimen sliding scale   SubCutaneous three times a day before meals  insulin lispro (ADMELOG) corrective regimen sliding scale   SubCutaneous at bedtime  metoprolol tartrate 50 milliGRAM(s) Oral every 12 hours  sodium bicarbonate  Infusion 0.061 mEq/kG/Hr (50 mL/Hr) IV Continuous <Continuous>  sodium chloride 0.45% 1000 milliLiter(s) (100 mL/Hr) IV Continuous <Continuous>  ticagrelor 90 milliGRAM(s) Oral every 12 hours    MEDICATIONS  (PRN):  acetaminophen     Tablet .. 650 milliGRAM(s) Oral every 6 hours PRN Temp greater or equal to 38C (100.4F), Mild Pain (1 - 3)  aluminum hydroxide/magnesium hydroxide/simethicone Suspension 30 milliLiter(s) Oral every 4 hours PRN Dyspepsia  dextrose Oral Gel 15 Gram(s) Oral once PRN Blood Glucose LESS THAN 70 milliGRAM(s)/deciliter  dextrose Oral Gel 15 Gram(s) Oral once PRN Blood Glucose LESS THAN 70 milliGRAM(s)/deciliter  heparin   Injectable 3500 Unit(s) IV Push every 6 hours PRN For aPTT less than 40  melatonin 3 milliGRAM(s) Oral at bedtime PRN Insomnia  ondansetron Injectable 4 milliGRAM(s) IV Push every 8 hours PRN Nausea and/or Vomiting       Allergies    No Known Allergies    Intolerances      Vital Signs Last 24 Hrs  T(C): 36.7 (2023 13:00), Max: 36.7 (2023 09:57)  T(F): 98.1 (2023 13:00), Max: 98.1 (2023 13:00)  HR: 87 (2023 14:00) (68 - 92)  BP: 151/71 (2023 14:00) (121/72 - 151/71)  BP(mean): 91 (2023 14:00) (82 - 100)  RR: 19 (2023 14:00) (18 - 19)  SpO2: 100% (2023 14:00) (98% - 100%)    Parameters below as of 2023 14:00  Patient On (Oxygen Delivery Method): room air      CAPILLARY BLOOD GLUCOSE      POCT Blood Glucose.: 162 mg/dL (2023 12:57)  POCT Blood Glucose.: 311 mg/dL (2023 12:06)  POCT Blood Glucose.: 146 mg/dL (2023 07:58)  POCT Blood Glucose.: 194 mg/dL (2023 21:11)  POCT Blood Glucose.: 125 mg/dL (2023 17:02)      - @ 07:01  -  -02 @ 07:00  --------------------------------------------------------  IN: 870 mL / OUT: 1300 mL / NET: -430 mL     @ 07:01  -   @ 14:09  --------------------------------------------------------  IN: 0 mL / OUT: 0 mL / NET: 0 mL      Physical Exam:    Daily     Daily Weight in k.8 (2023 08:00)  General:  Well appearing, NAD, not cachetic  HEENT:  Nonicteric, PERRLA  CV:  RRR, S1S2   Lungs:  CTA B/L, no wheezes, rales, rhonchi  Abdomen:  Soft, non-tender, no distended, positive BS  Extremities:  2+ pulses, no c/c, no edema  Skin:  Warm and dry, no rashes  :  No moreno  Neuro:  AAOx3, non-focal, grossly intact                                                                                                                                                                                                                                                                                                LABS:                               13.0   10.04 )-----------( 287      ( 2023 06:40 )             38.4                          138  |  105  |  28<H>  ----------------------------<  137<H>  4.0   |  19<L>  |  1.43<H>    Ca    9.8      2023 06:40                         RADIOLOGY & ADDITIONAL TESTS         I personally reviewed: [  ]EKG   [  ]CXR    [  ] CT      A/P:         Discussed with :     Derek consultants' Notes   Time spent :

## 2023-06-02 NOTE — PROGRESS NOTE ADULT - SUBJECTIVE AND OBJECTIVE BOX
Patient has  no complaints of chest pain or dyspnea this AM  /73  HR 87 AF  Lungs clear  Irregular rhythm  No edema    BUN 28  Crt 1.43  WBC 10.04  Hgb 13.0  Hct 38.4  Plt 287K  PTT 45.1    Imp:  NSTEMI with chronic AF - Hemodynamically stable with out further chest pain or dyspnea  For LM stent this AM   Start Valsartan 40 mg qd for the decreased LV function  Monitor Crt post Cath / stent

## 2023-06-03 ENCOUNTER — TRANSCRIPTION ENCOUNTER (OUTPATIENT)
Age: 68
End: 2023-06-03

## 2023-06-03 LAB
ANION GAP SERPL CALC-SCNC: 14 MMOL/L — SIGNIFICANT CHANGE UP (ref 5–17)
APTT BLD: 37.2 SEC — HIGH (ref 27.5–35.5)
BUN SERPL-MCNC: 28 MG/DL — HIGH (ref 7–23)
CALCIUM SERPL-MCNC: 10 MG/DL — SIGNIFICANT CHANGE UP (ref 8.4–10.5)
CHLORIDE SERPL-SCNC: 102 MMOL/L — SIGNIFICANT CHANGE UP (ref 96–108)
CO2 SERPL-SCNC: 22 MMOL/L — SIGNIFICANT CHANGE UP (ref 22–31)
CREAT SERPL-MCNC: 1.43 MG/DL — HIGH (ref 0.5–1.3)
EGFR: 54 ML/MIN/1.73M2 — LOW
GLUCOSE BLDC GLUCOMTR-MCNC: 164 MG/DL — HIGH (ref 70–99)
GLUCOSE BLDC GLUCOMTR-MCNC: 196 MG/DL — HIGH (ref 70–99)
GLUCOSE BLDC GLUCOMTR-MCNC: 236 MG/DL — HIGH (ref 70–99)
GLUCOSE BLDC GLUCOMTR-MCNC: 246 MG/DL — HIGH (ref 70–99)
GLUCOSE SERPL-MCNC: 299 MG/DL — HIGH (ref 70–99)
HCT VFR BLD CALC: 36.3 % — LOW (ref 39–50)
HGB BLD-MCNC: 12.6 G/DL — LOW (ref 13–17)
MCHC RBC-ENTMCNC: 31.8 PG — SIGNIFICANT CHANGE UP (ref 27–34)
MCHC RBC-ENTMCNC: 34.7 GM/DL — SIGNIFICANT CHANGE UP (ref 32–36)
MCV RBC AUTO: 91.7 FL — SIGNIFICANT CHANGE UP (ref 80–100)
NRBC # BLD: 0 /100 WBCS — SIGNIFICANT CHANGE UP (ref 0–0)
PLATELET # BLD AUTO: 273 K/UL — SIGNIFICANT CHANGE UP (ref 150–400)
POTASSIUM SERPL-MCNC: 5.1 MMOL/L — SIGNIFICANT CHANGE UP (ref 3.5–5.3)
POTASSIUM SERPL-SCNC: 5.1 MMOL/L — SIGNIFICANT CHANGE UP (ref 3.5–5.3)
RBC # BLD: 3.96 M/UL — LOW (ref 4.2–5.8)
RBC # FLD: 14.5 % — SIGNIFICANT CHANGE UP (ref 10.3–14.5)
SODIUM SERPL-SCNC: 138 MMOL/L — SIGNIFICANT CHANGE UP (ref 135–145)
WBC # BLD: 10.03 K/UL — SIGNIFICANT CHANGE UP (ref 3.8–10.5)
WBC # FLD AUTO: 10.03 K/UL — SIGNIFICANT CHANGE UP (ref 3.8–10.5)

## 2023-06-03 PROCEDURE — 99222 1ST HOSP IP/OBS MODERATE 55: CPT

## 2023-06-03 RX ORDER — CLOPIDOGREL BISULFATE 75 MG/1
300 TABLET, FILM COATED ORAL ONCE
Refills: 0 | Status: COMPLETED | OUTPATIENT
Start: 2023-06-04 | End: 2023-06-04

## 2023-06-03 RX ORDER — VALSARTAN 80 MG/1
40 TABLET ORAL DAILY
Refills: 0 | Status: DISCONTINUED | OUTPATIENT
Start: 2023-06-03 | End: 2023-06-04

## 2023-06-03 RX ORDER — RIVAROXABAN 15 MG-20MG
20 KIT ORAL DAILY
Refills: 0 | Status: DISCONTINUED | OUTPATIENT
Start: 2023-06-03 | End: 2023-06-04

## 2023-06-03 RX ADMIN — VALSARTAN 40 MILLIGRAM(S): 80 TABLET ORAL at 18:24

## 2023-06-03 RX ADMIN — Medication 50 MILLIGRAM(S): at 17:22

## 2023-06-03 RX ADMIN — INSULIN GLARGINE 20 UNIT(S): 100 INJECTION, SOLUTION SUBCUTANEOUS at 21:28

## 2023-06-03 RX ADMIN — CHLORHEXIDINE GLUCONATE 1 APPLICATION(S): 213 SOLUTION TOPICAL at 11:43

## 2023-06-03 RX ADMIN — TICAGRELOR 90 MILLIGRAM(S): 90 TABLET ORAL at 03:31

## 2023-06-03 RX ADMIN — Medication 50 MILLIGRAM(S): at 05:12

## 2023-06-03 RX ADMIN — Medication 1: at 07:57

## 2023-06-03 RX ADMIN — Medication 1: at 17:24

## 2023-06-03 RX ADMIN — Medication 2: at 11:44

## 2023-06-03 RX ADMIN — Medication 81 MILLIGRAM(S): at 11:43

## 2023-06-03 RX ADMIN — TICAGRELOR 90 MILLIGRAM(S): 90 TABLET ORAL at 15:23

## 2023-06-03 RX ADMIN — ATORVASTATIN CALCIUM 80 MILLIGRAM(S): 80 TABLET, FILM COATED ORAL at 21:29

## 2023-06-03 NOTE — DISCHARGE NOTE PROVIDER - NSDCFUADDAPPT_GEN_ALL_CORE_FT
APPTS ARE READY TO BE MADE: [X] YES    Best Family or Patient Contact (if needed):      1: Follow up with PCP Dr. Kumar within 1 week of discharge   2: Follow up with cardiologist Dr. Moon within 1 week of discharge          APPTS ARE READY TO BE MADE: [X] YES    Best Family or Patient Contact (if needed):      1: Follow up with PCP Dr. Kumar within 1 week of discharge   2: Follow up with cardiologist Dr. Moon within 1 week of discharge   3: Follow up with nephrologist Dr. Walton in 4 weeks outpatient          Please follow up with cardiologist within 1 week from discharge.     APPTS ARE READY TO BE MADE: [X] YES    Best Family or Patient Contact (if needed):      1: Follow up with PCP Dr. Kumar within 1 week of discharge   2: Follow up with cardiologist Dr. Moon within 1 week of discharge   3: Follow up with nephrologist Dr. Walton in 4 weeks outpatient

## 2023-06-03 NOTE — PROGRESS NOTE ADULT - SUBJECTIVE AND OBJECTIVE BOX
NEPHROLOGY     Patient seen and examined.    MEDICATIONS  (STANDING):  aspirin enteric coated 81 milliGRAM(s) Oral daily  atorvastatin 80 milliGRAM(s) Oral at bedtime  chlorhexidine 2% Cloths 1 Application(s) Topical daily  dextrose 5%. 1000 milliLiter(s) (50 mL/Hr) IV Continuous <Continuous>  dextrose 5%. 1000 milliLiter(s) (100 mL/Hr) IV Continuous <Continuous>  dextrose 50% Injectable 12.5 Gram(s) IV Push once  dextrose 50% Injectable 25 Gram(s) IV Push once  dextrose 50% Injectable 25 Gram(s) IV Push once  glucagon  Injectable 1 milliGRAM(s) IntraMuscular once  insulin glargine Injectable (LANTUS) 20 Unit(s) SubCutaneous at bedtime  insulin lispro (ADMELOG) corrective regimen sliding scale   SubCutaneous at bedtime  insulin lispro (ADMELOG) corrective regimen sliding scale   SubCutaneous three times a day before meals  metoprolol tartrate 50 milliGRAM(s) Oral every 12 hours  sodium bicarbonate  Infusion 0.061 mEq/kG/Hr (50 mL/Hr) IV Continuous <Continuous>  sodium chloride 0.45% 1000 milliLiter(s) (100 mL/Hr) IV Continuous <Continuous>  ticagrelor 90 milliGRAM(s) Oral every 12 hours    VITALS:  T(C): , Max: 37.2 (06-03-23 @ 08:23)  T(F): , Max: 99 (06-03-23 @ 08:23)  HR: 84 (06-03-23 @ 08:23)  BP: 134/65 (06-03-23 @ 08:23)  BP(mean): 80 (06-02-23 @ 16:50)  RR: 18 (06-03-23 @ 08:23)  SpO2: 98% (06-03-23 @ 08:23)    I and O's:    06-02 @ 07:01  -  06-03 @ 07:00  --------------------------------------------------------  IN: 0 mL / OUT: 750 mL / NET: -750 mL    PHYSICAL EXAM:  Constitutional: NAD  HEENT: PERRLA, EOMI,  MMM  Neck: No LAD, No JVD  Respiratory: CTAB  Cardiovascular: S1 and S2  Gastrointestinal: BS+, soft, NT/ND  Extremities: No peripheral edema  Neurological: A/O x 3, no focal deficits  : No Earl  Skin: No rashes    LABS:                        12.6   10.03 )-----------( 273      ( 03 Jun 2023 06:49 )             36.3     06-02    138  |  105  |  28<H>  ----------------------------<  137<H>  4.0   |  19<L>  |  1.43<H>    Ca    9.8      02 Jun 2023 06:40   NEPHROLOGY     Patient seen and examined sitting on bed, reports feeling well, denies cp or sob, in no acute distress.     MEDICATIONS  (STANDING):  aspirin enteric coated 81 milliGRAM(s) Oral daily  atorvastatin 80 milliGRAM(s) Oral at bedtime  chlorhexidine 2% Cloths 1 Application(s) Topical daily  dextrose 5%. 1000 milliLiter(s) (50 mL/Hr) IV Continuous <Continuous>  dextrose 5%. 1000 milliLiter(s) (100 mL/Hr) IV Continuous <Continuous>  dextrose 50% Injectable 12.5 Gram(s) IV Push once  dextrose 50% Injectable 25 Gram(s) IV Push once  dextrose 50% Injectable 25 Gram(s) IV Push once  glucagon  Injectable 1 milliGRAM(s) IntraMuscular once  insulin glargine Injectable (LANTUS) 20 Unit(s) SubCutaneous at bedtime  insulin lispro (ADMELOG) corrective regimen sliding scale   SubCutaneous at bedtime  insulin lispro (ADMELOG) corrective regimen sliding scale   SubCutaneous three times a day before meals  metoprolol tartrate 50 milliGRAM(s) Oral every 12 hours  sodium bicarbonate  Infusion 0.061 mEq/kG/Hr (50 mL/Hr) IV Continuous <Continuous>  sodium chloride 0.45% 1000 milliLiter(s) (100 mL/Hr) IV Continuous <Continuous>  ticagrelor 90 milliGRAM(s) Oral every 12 hours    VITALS:  T(C): , Max: 37.2 (06-03-23 @ 08:23)  T(F): , Max: 99 (06-03-23 @ 08:23)  HR: 84 (06-03-23 @ 08:23)  BP: 134/65 (06-03-23 @ 08:23)  BP(mean): 80 (06-02-23 @ 16:50)  RR: 18 (06-03-23 @ 08:23)  SpO2: 98% (06-03-23 @ 08:23)    I and O's:    06-02 @ 07:01  -  06-03 @ 07:00  --------------------------------------------------------  IN: 0 mL / OUT: 750 mL / NET: -750 mL    PHYSICAL EXAM:  Constitutional: NAD  HEENT: PERRLA, EOMI,  MMM  Neck: No LAD, No JVD  Respiratory: CTAB  Cardiovascular: S1 and S2  Gastrointestinal: BS+, soft, NT/ND  Extremities: No peripheral edema  Neurological: A/O x 3, no focal deficits  : No Earl  Skin: No rashes    LABS:                        12.6   10.03 )-----------( 273      ( 03 Jun 2023 06:49 )             36.3     06-02    138  |  105  |  28<H>  ----------------------------<  137<H>  4.0   |  19<L>  |  1.43<H>    Ca    9.8      02 Jun 2023 06:40

## 2023-06-03 NOTE — DISCHARGE NOTE PROVIDER - NSDCMRMEDTOKEN_GEN_ALL_CORE_FT
atorvastatin 80 mg oral tablet: 1 tab(s) orally once a day   clopidogrel 75 mg oral tablet: 1 tab(s) orally once a day  dilTIAZem 180 mg/24 hours oral capsule, extended release: 1 cap(s) orally once a day  ezetimibe 10 mg oral tablet: 1 tab(s) orally once a day  Lasix 40 mg oral tablet: 1 tab(s) orally once a day  metoprolol succinate 50 mg oral tablet, extended release: 1 tab(s) orally once a day  Semglee (Prefilled Pen) 100 units/mL subcutaneous solution: 30-40units subcutaneously once daily  ticagrelor 90 mg oral tablet: 1 tab(s) orally every 12 hours  Xarelto 20 mg oral tablet: 1 tab(s) orally once a day (before a meal) RESUME ON 3/8/23   aspirin 81 mg oral delayed release tablet: 1 tab(s) orally once a day Take for 7 days  atorvastatin 80 mg oral tablet: 1 tab(s) orally once a day   clopidogrel 75 mg oral tablet: 1 tab(s) orally once a day  ezetimibe 10 mg oral tablet: 1 tab(s) orally once a day  metoprolol tartrate 50 mg oral tablet: 1 tab(s) orally every 12 hours  rivaroxaban 20 mg oral tablet: 1 tab(s) orally once a day  Semglee (Prefilled Pen) 100 units/mL subcutaneous solution: 30-40units subcutaneously once daily  ticagrelor 90 mg oral tablet: 1 tab(s) orally every 12 hours  valsartan 40 mg oral tablet: 1 tab(s) orally once a day   aspirin 81 mg oral delayed release tablet: 1 tab(s) orally once a day Take for 7 days  atorvastatin 80 mg oral tablet: 1 tab(s) orally once a day   clopidogrel 75 mg oral tablet: 1 tab(s) orally once a day  ezetimibe 10 mg oral tablet: 1 tab(s) orally once a day  metoprolol tartrate 50 mg oral tablet: 1 tab(s) orally every 12 hours  rivaroxaban 20 mg oral tablet: 1 tab(s) orally once a day  Semglee (Prefilled Pen) 100 units/mL subcutaneous solution: 30-40units subcutaneously once daily  valsartan 40 mg oral tablet: 1 tab(s) orally once a day

## 2023-06-03 NOTE — PROGRESS NOTE ADULT - PROBLEM SELECTOR PLAN 3
- increased metoprolol tartrate 50 mg bid  - monitor on tele  - hold Xarelto for LHC... cont heparin for now
- increased metoprolol tartrate 50 mg bid  - monitor on tele  back on xarelto
- increased metoprolol tartrate 50 mg bid  - monitor on tele  - hold Xarelto for LHC... cont heparin for now.. restart post cath
- increased metoprolol tartrate 50 mg bid  - monitor on tele  - hold Xarelto for LHC... cont heparin for now
- increased metoprolol tartrate 50 mg bid  - monitor on tele  - hold Xarelto for LHC... cont heparin for now
- increased metoprolol tartrate 50 mg bid  - monitor on tele  - hold Xarelto for LHC

## 2023-06-03 NOTE — PROGRESS NOTE ADULT - SUBJECTIVE AND OBJECTIVE BOX
Date of service: 06-03-23 @ 23:52      Patient is a 68y old  Male who presents with a chief complaint of CP (03 Jun 2023 10:23)                                                               INTERVAL HPI/OVERNIGHT EVENTS:    REVIEW OF SYSTEMS:     CONSTITUTIONAL: No weakness, fevers or chills  EYES/ENT: No visual changes , no ear ache   NECK: No pain or stiffness  RESPIRATORY: No cough, wheezing,  No shortness of breath  CARDIOVASCULAR: No chest pain or palpitations  GASTROINTESTINAL: No abdominal pain  . No nausea, vomiting, or hematemesis; No diarrhea or constipation. No melena or hematochezia.  GENITOURINARY: No dysuria, frequency or hematuria  NEUROLOGICAL: No numbness or weakness  SKIN: No itching, burning, rashes, or lesions                                                                                                                                                                                                                                                                                 Medications:  MEDICATIONS  (STANDING):  aspirin enteric coated 81 milliGRAM(s) Oral daily  atorvastatin 80 milliGRAM(s) Oral at bedtime  chlorhexidine 2% Cloths 1 Application(s) Topical daily  dextrose 5%. 1000 milliLiter(s) (50 mL/Hr) IV Continuous <Continuous>  dextrose 5%. 1000 milliLiter(s) (100 mL/Hr) IV Continuous <Continuous>  dextrose 50% Injectable 12.5 Gram(s) IV Push once  dextrose 50% Injectable 25 Gram(s) IV Push once  dextrose 50% Injectable 25 Gram(s) IV Push once  glucagon  Injectable 1 milliGRAM(s) IntraMuscular once  insulin glargine Injectable (LANTUS) 20 Unit(s) SubCutaneous at bedtime  insulin lispro (ADMELOG) corrective regimen sliding scale   SubCutaneous three times a day before meals  insulin lispro (ADMELOG) corrective regimen sliding scale   SubCutaneous at bedtime  metoprolol tartrate 50 milliGRAM(s) Oral every 12 hours  rivaroxaban 20 milliGRAM(s) Oral daily  sodium bicarbonate  Infusion 0.061 mEq/kG/Hr (50 mL/Hr) IV Continuous <Continuous>  sodium chloride 0.45% 1000 milliLiter(s) (100 mL/Hr) IV Continuous <Continuous>  valsartan 40 milliGRAM(s) Oral daily    MEDICATIONS  (PRN):  acetaminophen     Tablet .. 650 milliGRAM(s) Oral every 6 hours PRN Temp greater or equal to 38C (100.4F), Mild Pain (1 - 3)  aluminum hydroxide/magnesium hydroxide/simethicone Suspension 30 milliLiter(s) Oral every 4 hours PRN Dyspepsia  dextrose Oral Gel 15 Gram(s) Oral once PRN Blood Glucose LESS THAN 70 milliGRAM(s)/deciliter  dextrose Oral Gel 15 Gram(s) Oral once PRN Blood Glucose LESS THAN 70 milliGRAM(s)/deciliter  melatonin 3 milliGRAM(s) Oral at bedtime PRN Insomnia  ondansetron Injectable 4 milliGRAM(s) IV Push every 8 hours PRN Nausea and/or Vomiting       Allergies    No Known Allergies    Intolerances      Vital Signs Last 24 Hrs  T(C): 37.2 (03 Jun 2023 20:43), Max: 37.2 (03 Jun 2023 08:23)  T(F): 98.9 (03 Jun 2023 20:43), Max: 99 (03 Jun 2023 08:23)  HR: 89 (03 Jun 2023 20:43) (84 - 93)  BP: 131/67 (03 Jun 2023 20:43) (131/67 - 149/75)  BP(mean): --  RR: 18 (03 Jun 2023 20:43) (18 - 18)  SpO2: 98% (03 Jun 2023 20:43) (97% - 100%)    Parameters below as of 03 Jun 2023 20:43  Patient On (Oxygen Delivery Method): room air      CAPILLARY BLOOD GLUCOSE      POCT Blood Glucose.: 246 mg/dL (03 Jun 2023 21:10)  POCT Blood Glucose.: 196 mg/dL (03 Jun 2023 17:23)  POCT Blood Glucose.: 236 mg/dL (03 Jun 2023 11:42)  POCT Blood Glucose.: 164 mg/dL (03 Jun 2023 07:45)      06-02 @ 07:01 - 06-03 @ 07:00  --------------------------------------------------------  IN: 0 mL / OUT: 750 mL / NET: -750 mL    06-03 @ 07:01  -  06-03 @ 23:52  --------------------------------------------------------  IN: 0 mL / OUT: 375 mL / NET: -375 mL      Physical Exam:    Daily     Daily   General:  Well appearing, NAD, not cachetic  HEENT:  Nonicteric, PERRLA  CV:  RRR, S1S2   Lungs:  CTA B/L, no wheezes, rales, rhonchi  Abdomen:  Soft, non-tender, no distended, positive BS  Extremities:  2+ pulses, no c/c, no edema  Skin:  Warm and dry, no rashes  :  No moreno  Neuro:  AAOx3, non-focal, grossly intact                                                                                                                                                                                                                                                                                                LABS:                               12.6   10.03 )-----------( 273      ( 03 Jun 2023 06:49 )             36.3                      06-03    138  |  102  |  28<H>  ----------------------------<  299<H>  5.1   |  22  |  1.43<H>    Ca    10.0      03 Jun 2023 10:08                         RADIOLOGY & ADDITIONAL TESTS         I personally reviewed: [  ]EKG   [  ]CXR    [  ] CT      A/P:         Discussed with :     Derek consultants' Notes   Time spent :

## 2023-06-03 NOTE — PROGRESS NOTE ADULT - PROBLEM SELECTOR PLAN 1
rising troponin, last [facility] 3/2023 with tight distal lesion in a small codominant RCA medically managing  Planned  [facility] : LM disease . plan for intervention on friday   .hold off on adding valsartan for now  - hep gtt, monitor PTT  - metoprolol 50 mg bid, brillinta, asa, lipitor 80
rising troponin, last [facility] 3/2023 with tight distal lesion in a small codominant RCA medically managing  Planned  [facility] : LM disease . now s/p stenting .. received brilinita and now changed to plavix loading starting tmmrw .. appreciated interventional cardiology input and care   - monitor Cr on valsartan   - hep gtt..now back on Xarelto   - metoprolol 50 mg bid, brillinta, asa, lipitor 80
rising troponin, last [facility] 3/2023 with tight distal lesion in a small codominant RCA medically managing  Planned  LH : LM disease . plan for intervention today   .hold off on adding valsartan for now  - hep gtt, monitor PTT  - metoprolol 50 mg bid, brillinta, asa, lipitor 80
rising troponin, last [facility] 3/2023 with tight distal lesion in a small codominant RCA medically managing  Planned  [facility] : on hold for now given BARRY marjan COLLINS .  ..monitor Cr   .hold off on adding valsartan for now  - trend troponin until peaks  - hep gtt, monitor PTT  - metoprolol 50 mg bid, brillinta, asa, lipitor 80  - holding home lasix
rising troponin, last [facility] 3/2023 with tight distal lesion in a small codominant RCA medically managing  Planned  LH : LM disease . plan for intervention on friday   .hold off on adding valsartan for now  - hep gtt, monitor PTT  - metoprolol 50 mg bid, brillinta, asa, lipitor 80
rising troponin, last [facility] 3/2023 with tight distal lesion in a small codominant RCA medically managing  - seen by cardiology Dr Rivas - Planned  [facility]..monitor Cr and if rising might need to hold off ..hold off on adding valsartan for now  - trend troponin until peaks  - hep gtt, monitor PTT  - metoprolol 50 mg bid, brillinta, asa, lipitor 80  - holding home lasix

## 2023-06-03 NOTE — DISCHARGE NOTE PROVIDER - HOSPITAL COURSE
69 yo M PMH pafib, CAD multiple PCIs w/ recent TITO 3/7/23 to LCx, CKD3, IDDM2 p/w L CP adm with NSTEMI w/ course c/b afib RVR.      NSTEMI (non-ST elevation myocardial infarction).   -rising troponin, last [facility] 3/2023 with tight distal lesion in a small codominant RCA medically managing  -Status post left heart cath   .hold off on adding valsartan for now  - hep gtt, monitor PTT  - metoprolol 50 mg bid, brillinta, asa, lipitor 80.     Type 2 diabetes mellitus.   -on insulin at home  -Most recent A1c 8.4%   - last admission, was on lantus 22 - start 20 u bedtime inpt       Afib.    - increased metoprolol tartrate 50 mg bid  - Xarelto held for [facility] --> heparin drip --> transitioned back to Xarelto status post cardiac cath     Abnormal finding on CT scan.   - RUL GGO opacity ~2 cm in size ---> patient will need f/u CT in 6 weeks to monitor  - SMA stenosis noted - however without symptoms, continue to monitor, continue with asa/brillinta/lipitor.        Pt has been medically cleared for discharge home per cardiology and Dr. Mcdaniel 69 yo M PMH pafib, CAD multiple PCIs w/ recent TITO 3/7/23 to LCx, CKD3, IDDM2 p/w L CP adm with NSTEMI w/ course c/b afib RVR.      NSTEMI (non-ST elevation myocardial infarction).   -rising troponin, last [facility] 3/2023 with tight distal lesion in a small codominant RCA medically managing  -Status post left heart cath   .hold off on adding valsartan for now  - hep gtt, monitor PTT  - metoprolol 50 mg bid, brillinta, asa, lipitor 80.     Type 2 diabetes mellitus.   -on insulin at home  -Most recent A1c 8.4%   - last admission, was on lantus 22 - start 20 u bedtime inpt       Afib.    - increased metoprolol tartrate 50 mg bid  - Xarelto held for [facility] --> heparin drip --> transitioned back to Xarelto status post cardiac cath     Abnormal finding on CT scan.   - RUL GGO opacity ~2 cm in size ---> patient will need f/u CT in 6 weeks to monitor  - SMA stenosis noted - however without symptoms, continue to monitor, continue with asa/brillinta/lipitor.      CKD   - nonproteinuric CKD3a   -due to hypertension/atherosclerosis  -Follow up with nephrologist Dr. Walton in 4 weeks outpatient     BARRY   - mild ATN from contrast nephropathy from CT early this admission  -seen resolving. No evidence as to date of further ATN status post diagnostic cath 5/31/23  -Obtain repeat BMP within 1 week from discharge   -Follow up with nephrologist outpatient in 4 weeks       Pt has been medically cleared for discharge home per cardiology and Dr. Mcdaniel 67 yo M PMH pafib, CAD multiple PCIs w/ recent TITO 3/7/23 to LCx, CKD3, IDDM2 p/w L CP adm with NSTEMI w/ course c/b afib RVR.      NSTEMI (non-ST elevation myocardial infarction).   -rising troponin, last [facility] 3/2023 with tight distal lesion in a small codominant RCA medically managing  -Status post left heart cath   .hold off on adding valsartan for now  - hep gtt, monitor PTT  - metoprolol 50 mg bid, Plavix, asa, Lipitor 80.     Type 2 diabetes mellitus.   -on insulin at home  -Most recent A1c 8.4%   - last admission, was on Lantus 22 - start 20 u bedtime inpt     Afib.    - increased metoprolol tartrate 50 mg bid  - Xarelto held for [facility] --> heparin drip --> transitioned back to Xarelto status post cardiac cath     Abnormal finding on CT scan.   - RUL GGO opacity ~2 cm in size ---> patient will need f/u CT in 6 weeks to monitor  - SMA stenosis noted - however without symptoms, continue to monitor, continue with asa/lipitor.      CKD   - nonproteinuric CKD3a   -due to hypertension/atherosclerosis  -Follow up with nephrologist Dr. Walton in 4 weeks outpatient     BARRY   - mild ATN from contrast nephropathy from CT early this admission  -seen resolving. No evidence as to date of further ATN status post diagnostic cath 5/31/23  -Obtain repeat BMP within 1 week from discharge   -Follow up with nephrologist outpatient in 4 weeks       Pt has been medically cleared for discharge home per cardiology and Dr. Mcdaniel

## 2023-06-03 NOTE — DISCHARGE NOTE PROVIDER - PROVIDER TOKENS
PROVIDER:[TOKEN:[[id number]],FOLLOWUP:[1 week]],PROVIDER:[TOKEN:[[id number]]] PROVIDER:[TOKEN:[[id number]],FOLLOWUP:[1 week]],PROVIDER:[TOKEN:[[id number]]],PROVIDER:[TOKEN:[[id number]]]

## 2023-06-03 NOTE — DISCHARGE NOTE PROVIDER - NSDCCPCAREPLAN_GEN_ALL_CORE_FT
PRINCIPAL DISCHARGE DIAGNOSIS  Diagnosis: Non-ST elevation MI (NSTEMI)  Assessment and Plan of Treatment: HOME CARE INSTRUCTIONS  For the next few days, avoid physical activities that bring on chest pain. Continue physical activities as directed.  Do not smoke.  Avoid drinking alcohol.   Only take over-the-counter or prescription medicine for pain, discomfort, or fever as directed by your caregiver.  Follow your caregiver's suggestions for further testing if your chest pain does not go away.  Keep any follow-up appointments you made. If you do not go to an appointment, you could develop lasting (chronic) problems with pain. If there is any problem keeping an appointment, you must call to reschedule.   SEEK MEDICAL CARE IF:  You think you are having problems from the medicine you are taking. Read your medicine instructions carefully.  Your chest pain does not go away, even after treatment.  You develop a rash with blisters on your chest.  SEEK IMMEDIATE MEDICAL CARE IF:  You have increased chest pain or pain that spreads to your arm, neck, jaw, back, or abdomen.   You develop shortness of breath, an increasing cough, or you are coughing up blood.  You have severe back or abdominal pain, feel nauseous, or vomit.  You develop severe weakness, fainting, or chills.  You have a fever.  THIS IS AN EMERGENCY. Do not wait to see if the pain will go away. Get medical help at once. Call your local emergency services . Do not drive yourself to the hospital.        SECONDARY DISCHARGE DIAGNOSES  Diagnosis: Afib  Assessment and Plan of Treatment: Atrial fibrillation is the most common heart rhythm problem.  The condition puts you at risk for has stroke and heart attack  You were started on blood thinners to prevent possible clot and stroke complications.   Monitor for any signs of bleeding and avoid injury while on this medication  If any bleeding persistent and symptomatic stop the medication and notify your doctor immediately.  It helps if you control your blood pressure, not drink more than 1-2 alcohol drinks per day, cut down on caffeine, getting treatment for over active thyroid gland, and get regular exercise  Call your doctor if you feel your heart racing or beating unusually, chest tightness or pain, lightheaded, faint, shortness of breath especially with exercise  It is important to take your heart medication as prescribed  You may be on anticoagulation which is very important to take as directed - you may need blood work to monitor drug levels      Diagnosis: Type 2 diabetes mellitus  Assessment and Plan of Treatment: Make sure you get your HgA1c checked every three months.  If you take oral diabetes medications, check your blood glucose two times a day.  If you take insulin, check your blood glucose before meals and at bedtime.  It's important not to skip any meals.  Keep a log of your blood glucose results and always take it with you to your doctor appointments.  Keep a list of your current medications including injectables and over the counter medications and bring this medication list with you to all your doctor appointments.  If you have not seen your ophthalmologist this year call for appointment.  Check your feet daily for redness, sores, or openings. Do not self treat. If no improvement in two days call your primary care physician for an appointment.  Low blood sugar (hypoglycemia) is a blood sugar below 70mg/dl. Check your blood sugar if you feel signs/symptoms of hypoglycemia. If your blood sugar is below 70 take 15 grams of carbohydrates (ex 4 oz of apple juice, 3-4 glucose tablets, or 4-6 oz of regular soda) wait 15 minutes and repeat blood sugar to make sure it comes up above 70.  If your blood sugar is above 70 and you are due for a meal, have a meal.  If you are not due for a meal have a snack.  This snack helps keeps your blood sugar at a safe range.       PRINCIPAL DISCHARGE DIAGNOSIS  Diagnosis: Non-ST elevation MI (NSTEMI)  Assessment and Plan of Treatment: HOME CARE INSTRUCTIONS  For the next few days, avoid physical activities that bring on chest pain. Continue physical activities as directed.  Do not smoke.  Avoid drinking alcohol.   Only take over-the-counter or prescription medicine for pain, discomfort, or fever as directed by your caregiver.  Follow your caregiver's suggestions for further testing if your chest pain does not go away.  Keep any follow-up appointments you made. If you do not go to an appointment, you could develop lasting (chronic) problems with pain. If there is any problem keeping an appointment, you must call to reschedule.   SEEK MEDICAL CARE IF:  You think you are having problems from the medicine you are taking. Read your medicine instructions carefully.  Your chest pain does not go away, even after treatment.  You develop a rash with blisters on your chest.  SEEK IMMEDIATE MEDICAL CARE IF:  You have increased chest pain or pain that spreads to your arm, neck, jaw, back, or abdomen.   You develop shortness of breath, an increasing cough, or you are coughing up blood.  You have severe back or abdominal pain, feel nauseous, or vomit.  You develop severe weakness, fainting, or chills.  You have a fever.  THIS IS AN EMERGENCY. Do not wait to see if the pain will go away. Get medical help at once. Call your local emergency services . Do not drive yourself to the hospital.        SECONDARY DISCHARGE DIAGNOSES  Diagnosis: Afib  Assessment and Plan of Treatment: Atrial fibrillation is the most common heart rhythm problem.  The condition puts you at risk for has stroke and heart attack  You were started on blood thinners to prevent possible clot and stroke complications.   Monitor for any signs of bleeding and avoid injury while on this medication  If any bleeding persistent and symptomatic stop the medication and notify your doctor immediately.  It helps if you control your blood pressure, not drink more than 1-2 alcohol drinks per day, cut down on caffeine, getting treatment for over active thyroid gland, and get regular exercise  Call your doctor if you feel your heart racing or beating unusually, chest tightness or pain, lightheaded, faint, shortness of breath especially with exercise  It is important to take your heart medication as prescribed  You may be on anticoagulation which is very important to take as directed - you may need blood work to monitor drug levels      Diagnosis: Type 2 diabetes mellitus  Assessment and Plan of Treatment: Make sure you get your HgA1c checked every three months.  If you take oral diabetes medications, check your blood glucose two times a day.  If you take insulin, check your blood glucose before meals and at bedtime.  It's important not to skip any meals.  Keep a log of your blood glucose results and always take it with you to your doctor appointments.  Keep a list of your current medications including injectables and over the counter medications and bring this medication list with you to all your doctor appointments.  If you have not seen your ophthalmologist this year call for appointment.  Check your feet daily for redness, sores, or openings. Do not self treat. If no improvement in two days call your primary care physician for an appointment.  Low blood sugar (hypoglycemia) is a blood sugar below 70mg/dl. Check your blood sugar if you feel signs/symptoms of hypoglycemia. If your blood sugar is below 70 take 15 grams of carbohydrates (ex 4 oz of apple juice, 3-4 glucose tablets, or 4-6 oz of regular soda) wait 15 minutes and repeat blood sugar to make sure it comes up above 70.  If your blood sugar is above 70 and you are due for a meal, have a meal.  If you are not due for a meal have a snack.  This snack helps keeps your blood sugar at a safe range.      Diagnosis: Stage 3 chronic kidney disease  Assessment and Plan of Treatment: Avoid taking (NSAIDs) - (ex: Ibuprofen, Advil, Celebrex, Naprosyn)  Avoid taking any nephrotoxic agents (can harm kidneys) - Intravenous contrast for diagnostic testing, combination cold medications.  Have all medications adjusted for your renal function by your Health Care Provider.  Blood pressure control is important.  Take all medication as prescribed.       PRINCIPAL DISCHARGE DIAGNOSIS  Diagnosis: Non-ST elevation MI (NSTEMI)  Assessment and Plan of Treatment: HOME CARE INSTRUCTIONS  For the next few days, avoid physical activities that bring on chest pain. Continue physical activities as directed.  Do not smoke.  Avoid drinking alcohol.   Only take over-the-counter or prescription medicine for pain, discomfort, or fever as directed by your caregiver.  Follow your caregiver's suggestions for further testing if your chest pain does not go away.  Keep any follow-up appointments you made. If you do not go to an appointment, you could develop lasting (chronic) problems with pain. If there is any problem keeping an appointment, you must call to reschedule.   SEEK MEDICAL CARE IF:  You think you are having problems from the medicine you are taking. Read your medicine instructions carefully.  Your chest pain does not go away, even after treatment.  You develop a rash with blisters on your chest.  SEEK IMMEDIATE MEDICAL CARE IF:  You have increased chest pain or pain that spreads to your arm, neck, jaw, back, or abdomen.   You develop shortness of breath, an increasing cough, or you are coughing up blood.  You have severe back or abdominal pain, feel nauseous, or vomit.  You develop severe weakness, fainting, or chills.  You have a fever.  THIS IS AN EMERGENCY. Do not wait to see if the pain will go away. Get medical help at once. Call your local emergency services . Do not drive yourself to the hospital.        SECONDARY DISCHARGE DIAGNOSES  Diagnosis: S/P coronary artery stent placement  Assessment and Plan of Treatment: Take Aspirin for only 7 days them discontinue.  Continue Plavix for 1 year.  Angioplasty or coronary stenting are procedures to open up narrowed or blocked coronary arteries in the heart.  A stent is a tiny metal tube that helps to prop open an artery in the heart muscle.    Your doctor will instruct you when you can drive or resume usual physical activities  You MUST take aspirin & another agent Plavix, Brilinta) to help prevent clots inside the stent.  It is VERY important to take these medications as directed unless your cardiologist says it is OK to stop.  If another physican advises you to stop them, call cardiologist to discuss this advise since there is a risk of a heart attack or even death stopping these medications earlier than they should be.  Do NOT take more than 81 mg aspirin with Brilinta  The most common problems after coronary stenting is bleeding, bruising, & soreness at the tube insertion site - you can use tylenol for discomfort if not contraindicated  Call your doctor if you have chest pain, fever, pain, swelling, or redness where the tube went in      Diagnosis: Type 2 diabetes mellitus  Assessment and Plan of Treatment: Make sure you get your HgA1c checked every three months.  If you take oral diabetes medications, check your blood glucose two times a day.  If you take insulin, check your blood glucose before meals and at bedtime.  It's important not to skip any meals.  Keep a log of your blood glucose results and always take it with you to your doctor appointments.  Keep a list of your current medications including injectables and over the counter medications and bring this medication list with you to all your doctor appointments.  If you have not seen your ophthalmologist this year call for appointment.  Check your feet daily for redness, sores, or openings. Do not self treat. If no improvement in two days call your primary care physician for an appointment.  Low blood sugar (hypoglycemia) is a blood sugar below 70mg/dl. Check your blood sugar if you feel signs/symptoms of hypoglycemia. If your blood sugar is below 70 take 15 grams of carbohydrates (ex 4 oz of apple juice, 3-4 glucose tablets, or 4-6 oz of regular soda) wait 15 minutes and repeat blood sugar to make sure it comes up above 70.  If your blood sugar is above 70 and you are due for a meal, have a meal.  If you are not due for a meal have a snack.  This snack helps keeps your blood sugar at a safe range.      Diagnosis: Afib  Assessment and Plan of Treatment: Atrial fibrillation is the most common heart rhythm problem.  The condition puts you at risk for has stroke and heart attack  You were started on blood thinners to prevent possible clot and stroke complications.   Monitor for any signs of bleeding and avoid injury while on this medication  If any bleeding persistent and symptomatic stop the medication and notify your doctor immediately.  It helps if you control your blood pressure, not drink more than 1-2 alcohol drinks per day, cut down on caffeine, getting treatment for over active thyroid gland, and get regular exercise  Call your doctor if you feel your heart racing or beating unusually, chest tightness or pain, lightheaded, faint, shortness of breath especially with exercise  It is important to take your heart medication as prescribed  You may be on anticoagulation which is very important to take as directed - you may need blood work to monitor drug levels      Diagnosis: Stage 3 chronic kidney disease  Assessment and Plan of Treatment: Avoid taking (NSAIDs) - (ex: Ibuprofen, Advil, Celebrex, Naprosyn)  Avoid taking any nephrotoxic agents (can harm kidneys) - Intravenous contrast for diagnostic testing, combination cold medications.  Have all medications adjusted for your renal function by your Health Care Provider.  Blood pressure control is important.  Take all medication as prescribed.

## 2023-06-03 NOTE — PROGRESS NOTE ADULT - PROBLEM SELECTOR PLAN 4
- RUL GGO opacity ~2 cm in size ---> pt will need f/u CT in 6 weeks to monitor  - SMA stenosis noted - however without symptoms, continue to monitor, c/w asa/brillinta/lipitor
Home
- RUL GGO opacity ~2 cm in size ---> pt will need f/u CT in 6 weeks to monitor  - SMA stenosis noted - however without symptoms, continue to monitor, c/w asa/brillinta/lipitor

## 2023-06-03 NOTE — DISCHARGE NOTE PROVIDER - NPI NUMBER (FOR SYSADMIN USE ONLY) :
Pt had another violent outburst. MD ordered 2nd doses of haldol, benadryl, and lorazapam. IM injections given in Lt arm. Pt continues to be restrained in police restraint chair.     [[id number]] [[id number]]

## 2023-06-03 NOTE — PROGRESS NOTE ADULT - SUBJECTIVE AND OBJECTIVE BOX
S/P PCI and TITO of an ostial LM  /65  HR 84  AF  Lungs clear  Irregular rhythm 1/6 systolic murmur  No edema    WBC 10.03  Hgb 12.6  Hct 36.3  Plt 273K  BMP - pending    Imp:  NSTEMI with chronic AF now S/P PCI and TITO to an ostial LM  Rec:  Resume Eliquis 5 mg bid    Start Valsartan 40 mg qd   Continue ASA and switch Brilinta to Plavix  Continue triple therapy for 1 week and then stop the ASA and continue the Plavix for 1 year  Check BMP today and in AM S/P PCI and TITO of an ostial LM  /65  HR 84  AF  Lungs clear  Irregular rhythm 1/6 systolic murmur  No edema    WBC 10.03  Hgb 12.6  Hct 36.3  Plt 273K  BMP - pending    Imp:  NSTEMI with chronic AF now S/P PCI and TITO to an ostial LM  Rec:  Resume Xarelto 20 mg qd    Start Valsartan 40 mg qd   Continue ASA and switch Brilinta to Plavix  Continue triple therapy for 1 week and then stop the ASA and continue the Plavix for 1 year  Check BMP today

## 2023-06-03 NOTE — PROGRESS NOTE ADULT - ASSESSMENT
69 yo M PMH pafib, CAD multiple PCIs w/ recent TITO 3/7/23 to LCx, CKD3 IDDM2 p/w L chest pain.     -6/2 s/p Successful PCI with TITO to the ostial LM via RFA  - RFA, cath access site, stable   - currently on Brilinta, as per phone conversation with Dr Dominguez please cont Brilinta just for today ( since pt already received is morning dose), after tonight's dose then DC Brilinta and in am of 6/4 load patient with Plavix 300mg PO X1 then start Plavix 75mg PO daily from 6/5 on. ( tried to call Dr Rivas to discuss above but no answer, left a TEAMS message)  - pt on Xarelto for Afib , was on hold for procedure.  OK to restart Xarelto at this time   - Triple therapy for 1 week ( ASA/Plavix/Xarelto) after 1 week STOP Aspirin and cont with Plavix and Xarelto only   -  recs to cont Plavix for 1 year  - Continue atorvastatin      - Reviewed and reinforced with patient:  wound care instructions, activities dos and dont's, medication compliance specifically antiplatelet therapy given stent/s.    - Patient aware to take DAPT  as prescribed and DO NOT STOP taking without consulting cardiologist first or STENT/s WILL CLOSE  - Reviewed and reinforced with patient:  site complications ( eg: bleeding, excruciating pain at the procedural site, large swelling ball size-  extremity numbness, tingling, temperature change), or CHEST PAIN; pt aware that if any of those occur he/she must call cardiologist IMMEDIATELY or 911 or go to nearest emergency room   - Reviewed and reinforced a heart healthy diet, Smoking Cessation  - Avoid using NSAIDs  (Aleve, Motrin, ibuprofen, naproxen) while on DAPT, please utilize Tylenol for pain control (not to exceed 4gm in 24 hours)  - Patient verbalizes understanding of ALL OF THE ABOVE, and gives positive feedback   -please make sure DAPT is prescribed to pt's preferred pharmacy on dc   -f/u appt in 2 weeks post dc with outpt cardiologist  -Keep Mg >2 K>4   - cont to monitor on tele  - all other care as per primary team, renal f/u , cards Arjun Escalante NP  invasive cardiology    69 yo M PMH pafib, CAD multiple PCIs w/ recent TITO 3/7/23 to LCx, CKD3 IDDM2 p/w L chest pain.     -6/2 s/p Successful PCI with TITO to the ostial LM via RFA  - RFA, cath access site, stable   - currently on Brilinta, as per phone conversation with Dr Dominguez please cont Brilinta just for today ( since pt already received is morning dose), after tonight's dose then DC Brilinta and in am of 6/4 load patient with Plavix 300mg PO X1 then start Plavix 75mg PO daily from 6/5 on. ( tried to call Dr Rivas to discuss above but no answer, left a TEAMS message)  - Above plan discussed with 6T ACP- Aarti uY  - pt on Xarelto for Afib , was on hold for procedure.  OK to restart Xarelto at this time   - Triple therapy for 1 week ( ASA/Plavix/Xarelto) after 1 week STOP Aspirin and cont with Plavix and Xarelto only   -  recs to cont Plavix for 1 year  - Continue atorvastatin      - Reviewed and reinforced with patient:  wound care instructions, activities dos and dont's, medication compliance specifically antiplatelet therapy given stent/s.    - Patient aware to take DAPT  as prescribed and DO NOT STOP taking without consulting cardiologist first or STENT/s WILL CLOSE  - Reviewed and reinforced with patient:  site complications ( eg: bleeding, excruciating pain at the procedural site, large swelling ball size-  extremity numbness, tingling, temperature change), or CHEST PAIN; pt aware that if any of those occur he/she must call cardiologist IMMEDIATELY or 911 or go to nearest emergency room   - Reviewed and reinforced a heart healthy diet, Smoking Cessation  - Avoid using NSAIDs  (Aleve, Motrin, ibuprofen, naproxen) while on DAPT, please utilize Tylenol for pain control (not to exceed 4gm in 24 hours)  - Patient verbalizes understanding of ALL OF THE ABOVE, and gives positive feedback   -please make sure DAPT is prescribed to pt's preferred pharmacy on dc   -f/u appt in 2 weeks post dc with outpt cardiologist  -Keep Mg >2 K>4   - cont to monitor on tele  - all other care as per primary team, renal f/u , cards Arjun Escalante NP  invasive cardiology

## 2023-06-03 NOTE — DISCHARGE NOTE PROVIDER - CARE PROVIDER_API CALL
Dennis Kumar.  Family Medicine  [address], [address] [place] [address]  Phone: [phone]  Fax: [phone]  Follow Up Time: 1 week    Alfred Moon  Cardiology  [address] [place] [address]  Phone: [phone]  Fax: [phone]  Follow Up Time:    Dennis Kumar  Family Medicine  [address] [facility], [address] [place] [address]  Phone: [phone]  Fax: [phone]  Follow Up Time: 1 week    Alfred Moon  Cardiology  [address] [place] [address]  Phone: [phone]  Fax: [phone]  Follow Up Time:     Markell Walton  Nephrology  [address] [facility], [facility] [[email or web address] [address]  [place] [address]  Phone: [phone]  Fax: [phone]  Follow Up Time:

## 2023-06-03 NOTE — PROGRESS NOTE ADULT - SUBJECTIVE AND OBJECTIVE BOX
Interventional Cardiology Post Cath Progress Note  CARDIAC CATH LAB, ACP TEAM   [phone]      CHIEF COMPLAINT: Patient is a 68y old  Male who presents with a chief complaint of NSTEMI (2023 10:04)      HPI:  69 yo M PMH pafib, CAD multiple PCIs w/ recent TITO 3/7/23 to LCx, CKD3 IDDM2 p/w L CP. States he began experiencing L CP day prior to admission while eating, worse with exertion, radiating to back, associated w some SOB. No diaphoresis, n/v, abd pain, f/chills. Says this was similar to prior CP episodes requiring LHC. Denies infectious complaints including diarrhea, dysuria, rash.    In the ED VS noted - afebrile, HR 120s afib  Meds received - hep gtt, brillinta, ppi iv, metoprolol iv, labetalol 10 mg iv   (28 May 2023 12:59)        Subjective/Observations: patient seen and examined.  Awake and alert, denies chest pain, dyspnea dizziness, palpitations, N&V, HA      Review of Systems all WNL except below indicated:    Constitutional: [ ] Fever [ ] Chills [ ] Fatigue [ ] Weight change   HEENT: [ ] Blurred vision [ ] Eye Pain [ ] Headache [ ] Runny nose [ ] Sore Throat   Respiratory: [ ] Cough [ ] Wheezing [ ] Shortness of breath  Cardiovascular: [ ] Chest Pain [ ] Palpitations [ ] CLEARY [ ] PND [ ] Orthopnea  Gastrointestinal: [ ] Abdominal Pain [ ] Diarrhea [ ] Constipation [ ] Hemorrhoids [ ] Nausea [ ] Vomiting  Genitourinary: [ ] Nocturia [ ] Dysuria [ ] Incontinence  Extremities: [ ] Swelling [ ] Joint Pain  Neurologic: [ ] Focal deficit [ ] Paresthesias [ ] Syncope  Lymphatic: [ ] Swelling [ ] Lymphadenopathy   Skin: [ ] Rash [ ] Ecchymoses [ ] Wounds [ ] Lesions  Psychiatry: [ ] Depression [ ] Suicidal/Homicidal Ideation [ ] Anxiety [ ] Sleep Disturbances  [x ] 10 point review of systems is otherwise negative except as mentioned above            [ ]Unable to obtain    Objective:  Vital Signs Last 24 Hrs  T(C): 37.2 (2023 08:23), Max: 37.2 (2023 08:23)  T(F): 99 (2023 08:23), Max: 99 (2023 08:23)  HR: 84 (2023 08:23) (75 - 103)  BP: 134/65 (2023 08:23) (120/83 - 157/60)  BP(mean): 80 (2023 16:50) (80 - 100)  RR: 18 (2023 08:23) (15 - 19)  SpO2: 98% (2023 08:23) (96% - 100%)    Parameters below as of 2023 08:23  Patient On (Oxygen Delivery Method): room air        23 @ 07:01  -  23 @ 07:00  --------------------------------------------------------  IN: 0 mL / OUT: 750 mL / NET: -750 mL        PAST MEDICAL & SURGICAL HISTORY:  Former smoker      CAD in native artery      Type 2 diabetes mellitus      Hyperlipidemia, unspecified hyperlipidemia type      Essential hypertension, hypertension with unspecified goal      Afib      Hematoma of leg      Stented coronary artery      CHF (congestive heart failure)      s/p Carotid Endarterectomy  left          SOCIAL HISTORY:  No tobacco, ethanol, or drug abuse.    FAMILY HISTORY:  Family history of heart disease  father  age 71    FH: atrial fibrillation  sister      No family history of acute MI or sudden cardiac death.    MEDICATIONS  (STANDING):  aspirin enteric coated 81 milliGRAM(s) Oral daily  atorvastatin 80 milliGRAM(s) Oral at bedtime  chlorhexidine 2% Cloths 1 Application(s) Topical daily  dextrose 5%. 1000 milliLiter(s) (100 mL/Hr) IV Continuous <Continuous>  dextrose 5%. 1000 milliLiter(s) (50 mL/Hr) IV Continuous <Continuous>  dextrose 50% Injectable 25 Gram(s) IV Push once  dextrose 50% Injectable 25 Gram(s) IV Push once  dextrose 50% Injectable 12.5 Gram(s) IV Push once  glucagon  Injectable 1 milliGRAM(s) IntraMuscular once  insulin glargine Injectable (LANTUS) 20 Unit(s) SubCutaneous at bedtime  insulin lispro (ADMELOG) corrective regimen sliding scale   SubCutaneous three times a day before meals  insulin lispro (ADMELOG) corrective regimen sliding scale   SubCutaneous at bedtime  metoprolol tartrate 50 milliGRAM(s) Oral every 12 hours  sodium bicarbonate  Infusion 0.061 mEq/kG/Hr (50 mL/Hr) IV Continuous <Continuous>  sodium chloride 0.45% 1000 milliLiter(s) (100 mL/Hr) IV Continuous <Continuous>  ticagrelor 90 milliGRAM(s) Oral every 12 hours    MEDICATIONS  (PRN):  acetaminophen     Tablet .. 650 milliGRAM(s) Oral every 6 hours PRN Temp greater or equal to 38C (100.4F), Mild Pain (1 - 3)  aluminum hydroxide/magnesium hydroxide/simethicone Suspension 30 milliLiter(s) Oral every 4 hours PRN Dyspepsia  dextrose Oral Gel 15 Gram(s) Oral once PRN Blood Glucose LESS THAN 70 milliGRAM(s)/deciliter  dextrose Oral Gel 15 Gram(s) Oral once PRN Blood Glucose LESS THAN 70 milliGRAM(s)/deciliter  melatonin 3 milliGRAM(s) Oral at bedtime PRN Insomnia  ondansetron Injectable 4 milliGRAM(s) IV Push every 8 hours PRN Nausea and/or Vomiting      Allergies    No Known Allergies    Intolerances                                12.6   10.03 )-----------( 273      ( 2023 06:49 )             36.3     06-02    138  |  105  |  28<H>  ----------------------------<  137<H>  4.0   |  19<L>  |  1.43<H>    Ca    9.8      2023 06:40      PTT - ( 2023 06:40 )  PTT:45.1 sec        Physical Exam:  No apparent distress, alert and oriented times three, appropriate affect  JVD is not elevated, supple  Clear to auscultation with no wheezing, ronchi or crackles  IRRegular rate and rhythm with 1/6 systolic  murmur, rub or gallop  Positive bowel sounds, soft, non-tender, non-distended, no masses/guarding or rebound tenderness    Right  groin w/o bleeding or hematoma.  soft, non tender.  Pulses in the (right) lower extremity are (palpable +2).  Denies chest pain, denies groin/leg/foot: pain, numbness or tingling           TELEMETRY:  Afib 80-90s, no events   	    < from: TTE W or WO Ultrasound Enhancing Agent (23 @ 10:53) >  CONCLUSIONS:      1. No evidence of a thrombus in theleft ventricle.   2. Basal and mid inferior wall, basal and mid inferolateral wall, and basal inferoseptal segment are abnormal.   3. Compared to the transthoracic echocardiogram performed on 2023 There is new inferior and inferolateral hypokinesis.. Findings were discussed with Marianela Navarro NP on 2023 at 12.43pm.    < end of copied text >      < from: Cardiac Catheterization (23 @ 12:23) >  Diagnostic Findings:     Coronary Angiography   LM   Ostial left main: There is a 70 % stenosis.      Interventional Findings:     Interventional Details   Left main artery: The initial stenosis was 70 %. Guidewire crossing  was successful.    Successful PCI with TITO to the ostial LM.  Triple therapy for 1 week  and Plavix for 1 year    < end of copied text >        < from: Cardiac Catheterization (23 @ 10:33) >    Severe ostial left main coronary artery disease (dampening upon  engagement)  Mild to moderate in-stent restenosis of stents in the left anterior  descending artery and left circumflex artery  Co-dominant system   No gradient across the aortic valve   LVEDP = 18mmHg     Recommendations:     Keep left leg straight for 4 hours following removal of sheath   Recommend staged PCI of the ostial left main coronary artery   Continue to closely monitor kidney function   Continue aggressive medical management of coronary artery disease and  associated risk factors  Gentle IV hydration as per protocol     Findings discussed with cardiology/Dr. Santana and nephrology/Dr. Walton    < end of copied text >

## 2023-06-03 NOTE — PROGRESS NOTE ADULT - ASSESSMENT
IMPRESSION: 68M w/ HTN, DM2, AFib, CAD, and CKD, 5/28/23 p/w chest pain    (1)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 No evidence as to date of further ATN s/p diagnostic cath 5/31/23    (3)Lytes - acceptable as of late, labs pending     (4)Cardiac - significant left main disease -s/p PCI yesterday          IMPRESSION: 68M w/ HTN, DM2, AFib, CAD, and CKD, 5/28/23 p/w chest pain    (1)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 No evidence as to date of further ATN s/p diagnostic cath 5/31/23    (3)Lytes - acceptable as of late, labs pending     (4)Cardiac - significant left main disease -s/p PCI yesterday     RECOMMEND:  (1)Follow up BMP  (2)No objection to discharge in if creatinine <1.6; if for discharge, would repeat BMP in 1 week; would plan for renal followup in 1-4 weeks    Delilah Foy, GLORIA  [facility]  [phone]

## 2023-06-03 NOTE — PROGRESS NOTE ADULT - PROBLEM SELECTOR PLAN 5
dvt ppx: hep gtt  no PT needs  Dispo: pending [facility]
dvt ppx: hep gtt  no PT needs  Dispo: pending [facility]
dvt ppx: hep gtt  no PT needs  Dispo: pending [facility]
dvt ppx: hep gtt  no PT needs  Dispo: pending [facility]
dvt ppx: hep gtt  no PT needs  Dispo: pending [facility]
dvt ppx: hep gtt  no PT needs  Dispo: pending [facility]

## 2023-06-03 NOTE — DISCHARGE NOTE PROVIDER - NSDCFUADDINST_GEN_ALL_CORE_FT
Please obtain repeat BMP (basic metabolic panel blood test) within 1 week from discharge  Follow up with nephrologist within 4 weeks

## 2023-06-04 ENCOUNTER — TRANSCRIPTION ENCOUNTER (OUTPATIENT)
Age: 68
End: 2023-06-04

## 2023-06-04 LAB
ANION GAP SERPL CALC-SCNC: 14 MMOL/L — SIGNIFICANT CHANGE UP (ref 5–17)
BUN SERPL-MCNC: 31 MG/DL — HIGH (ref 7–23)
CALCIUM SERPL-MCNC: 9.5 MG/DL — SIGNIFICANT CHANGE UP (ref 8.4–10.5)
CHLORIDE SERPL-SCNC: 106 MMOL/L — SIGNIFICANT CHANGE UP (ref 96–108)
CO2 SERPL-SCNC: 19 MMOL/L — LOW (ref 22–31)
CREAT SERPL-MCNC: 1.5 MG/DL — HIGH (ref 0.5–1.3)
EGFR: 51 ML/MIN/1.73M2 — LOW
GLUCOSE BLDC GLUCOMTR-MCNC: 158 MG/DL — HIGH (ref 70–99)
GLUCOSE BLDC GLUCOMTR-MCNC: 263 MG/DL — HIGH (ref 70–99)
GLUCOSE SERPL-MCNC: 145 MG/DL — HIGH (ref 70–99)
HCT VFR BLD CALC: 39.7 % — SIGNIFICANT CHANGE UP (ref 39–50)
HGB BLD-MCNC: 13.5 G/DL — SIGNIFICANT CHANGE UP (ref 13–17)
MCHC RBC-ENTMCNC: 31.4 PG — SIGNIFICANT CHANGE UP (ref 27–34)
MCHC RBC-ENTMCNC: 34 GM/DL — SIGNIFICANT CHANGE UP (ref 32–36)
MCV RBC AUTO: 92.3 FL — SIGNIFICANT CHANGE UP (ref 80–100)
NRBC # BLD: 0 /100 WBCS — SIGNIFICANT CHANGE UP (ref 0–0)
PLATELET # BLD AUTO: 304 K/UL — SIGNIFICANT CHANGE UP (ref 150–400)
POTASSIUM SERPL-MCNC: 4.3 MMOL/L — SIGNIFICANT CHANGE UP (ref 3.5–5.3)
POTASSIUM SERPL-SCNC: 4.3 MMOL/L — SIGNIFICANT CHANGE UP (ref 3.5–5.3)
RBC # BLD: 4.3 M/UL — SIGNIFICANT CHANGE UP (ref 4.2–5.8)
RBC # FLD: 14.6 % — HIGH (ref 10.3–14.5)
SODIUM SERPL-SCNC: 139 MMOL/L — SIGNIFICANT CHANGE UP (ref 135–145)
WBC # BLD: 9.14 K/UL — SIGNIFICANT CHANGE UP (ref 3.8–10.5)
WBC # FLD AUTO: 9.14 K/UL — SIGNIFICANT CHANGE UP (ref 3.8–10.5)

## 2023-06-04 PROCEDURE — 87641 MR-STAPH DNA AMP PROBE: CPT

## 2023-06-04 PROCEDURE — 74174 CTA ABD&PLVS W/CONTRAST: CPT | Mod: MA

## 2023-06-04 PROCEDURE — 96375 TX/PRO/DX INJ NEW DRUG ADDON: CPT

## 2023-06-04 PROCEDURE — 82565 ASSAY OF CREATININE: CPT

## 2023-06-04 PROCEDURE — 85027 COMPLETE CBC AUTOMATED: CPT

## 2023-06-04 PROCEDURE — 82947 ASSAY GLUCOSE BLOOD QUANT: CPT

## 2023-06-04 PROCEDURE — 85014 HEMATOCRIT: CPT

## 2023-06-04 PROCEDURE — C9600: CPT | Mod: LM

## 2023-06-04 PROCEDURE — 71275 CT ANGIOGRAPHY CHEST: CPT | Mod: MA

## 2023-06-04 PROCEDURE — 80061 LIPID PANEL: CPT

## 2023-06-04 PROCEDURE — 80048 BASIC METABOLIC PNL TOTAL CA: CPT

## 2023-06-04 PROCEDURE — 93005 ELECTROCARDIOGRAM TRACING: CPT

## 2023-06-04 PROCEDURE — 99285 EMERGENCY DEPT VISIT HI MDM: CPT | Mod: 25

## 2023-06-04 PROCEDURE — 83880 ASSAY OF NATRIURETIC PEPTIDE: CPT

## 2023-06-04 PROCEDURE — C1725: CPT

## 2023-06-04 PROCEDURE — 82550 ASSAY OF CK (CPK): CPT

## 2023-06-04 PROCEDURE — 84156 ASSAY OF PROTEIN URINE: CPT

## 2023-06-04 PROCEDURE — 83735 ASSAY OF MAGNESIUM: CPT

## 2023-06-04 PROCEDURE — 71045 X-RAY EXAM CHEST 1 VIEW: CPT

## 2023-06-04 PROCEDURE — 84484 ASSAY OF TROPONIN QUANT: CPT

## 2023-06-04 PROCEDURE — 82962 GLUCOSE BLOOD TEST: CPT

## 2023-06-04 PROCEDURE — 85610 PROTHROMBIN TIME: CPT

## 2023-06-04 PROCEDURE — C1874: CPT

## 2023-06-04 PROCEDURE — 82330 ASSAY OF CALCIUM: CPT

## 2023-06-04 PROCEDURE — C1769: CPT

## 2023-06-04 PROCEDURE — 83605 ASSAY OF LACTIC ACID: CPT

## 2023-06-04 PROCEDURE — C1894: CPT

## 2023-06-04 PROCEDURE — 93458 L HRT ARTERY/VENTRICLE ANGIO: CPT

## 2023-06-04 PROCEDURE — 85025 COMPLETE CBC W/AUTO DIFF WBC: CPT

## 2023-06-04 PROCEDURE — 87640 STAPH A DNA AMP PROBE: CPT

## 2023-06-04 PROCEDURE — C1887: CPT

## 2023-06-04 PROCEDURE — 82553 CREATINE MB FRACTION: CPT

## 2023-06-04 PROCEDURE — 93308 TTE F-UP OR LMTD: CPT

## 2023-06-04 PROCEDURE — 85730 THROMBOPLASTIN TIME PARTIAL: CPT

## 2023-06-04 PROCEDURE — 84300 ASSAY OF URINE SODIUM: CPT

## 2023-06-04 PROCEDURE — 82435 ASSAY OF BLOOD CHLORIDE: CPT

## 2023-06-04 PROCEDURE — 85018 HEMOGLOBIN: CPT

## 2023-06-04 PROCEDURE — 84295 ASSAY OF SERUM SODIUM: CPT

## 2023-06-04 PROCEDURE — 80053 COMPREHEN METABOLIC PANEL: CPT

## 2023-06-04 PROCEDURE — 81001 URINALYSIS AUTO W/SCOPE: CPT

## 2023-06-04 PROCEDURE — 36415 COLL VENOUS BLD VENIPUNCTURE: CPT

## 2023-06-04 PROCEDURE — 82570 ASSAY OF URINE CREATININE: CPT

## 2023-06-04 PROCEDURE — 96374 THER/PROPH/DIAG INJ IV PUSH: CPT

## 2023-06-04 PROCEDURE — 82803 BLOOD GASES ANY COMBINATION: CPT

## 2023-06-04 PROCEDURE — 84132 ASSAY OF SERUM POTASSIUM: CPT

## 2023-06-04 RX ORDER — INSULIN LISPRO 100/ML
5 VIAL (ML) SUBCUTANEOUS
Refills: 0 | Status: DISCONTINUED | OUTPATIENT
Start: 2023-06-04 | End: 2023-06-04

## 2023-06-04 RX ORDER — ASPIRIN/CALCIUM CARB/MAGNESIUM 324 MG
1 TABLET ORAL
Qty: 0 | Refills: 0 | DISCHARGE
Start: 2023-06-04

## 2023-06-04 RX ORDER — CLOPIDOGREL BISULFATE 75 MG/1
1 TABLET, FILM COATED ORAL
Qty: 30 | Refills: 1
Start: 2023-06-04 | End: 2023-08-02

## 2023-06-04 RX ORDER — VALSARTAN 80 MG/1
1 TABLET ORAL
Qty: 30 | Refills: 0
Start: 2023-06-04 | End: 2023-07-03

## 2023-06-04 RX ORDER — METOPROLOL TARTRATE 50 MG
1 TABLET ORAL
Qty: 60 | Refills: 0
Start: 2023-06-04 | End: 2023-07-03

## 2023-06-04 RX ORDER — METOPROLOL TARTRATE 50 MG
1 TABLET ORAL
Qty: 0 | Refills: 0 | DISCHARGE

## 2023-06-04 RX ORDER — INSULIN GLARGINE 100 [IU]/ML
22 INJECTION, SOLUTION SUBCUTANEOUS AT BEDTIME
Refills: 0 | Status: DISCONTINUED | OUTPATIENT
Start: 2023-06-04 | End: 2023-06-04

## 2023-06-04 RX ORDER — RIVAROXABAN 15 MG-20MG
1 KIT ORAL
Qty: 0 | Refills: 0 | DISCHARGE
Start: 2023-06-04

## 2023-06-04 RX ADMIN — CLOPIDOGREL BISULFATE 300 MILLIGRAM(S): 75 TABLET, FILM COATED ORAL at 05:22

## 2023-06-04 RX ADMIN — Medication 50 MILLIGRAM(S): at 05:23

## 2023-06-04 RX ADMIN — CHLORHEXIDINE GLUCONATE 1 APPLICATION(S): 213 SOLUTION TOPICAL at 12:01

## 2023-06-04 RX ADMIN — VALSARTAN 40 MILLIGRAM(S): 80 TABLET ORAL at 05:23

## 2023-06-04 RX ADMIN — Medication 81 MILLIGRAM(S): at 11:59

## 2023-06-04 RX ADMIN — RIVAROXABAN 20 MILLIGRAM(S): KIT at 11:59

## 2023-06-04 RX ADMIN — Medication 3: at 12:00

## 2023-06-04 RX ADMIN — Medication 1: at 07:53

## 2023-06-04 NOTE — PROGRESS NOTE ADULT - SUBJECTIVE AND OBJECTIVE BOX
pt stable for dc to fu w cardio as op   appreciate cardio input   d/w NP   acp : pt is full code   see dc summary

## 2023-06-04 NOTE — PROGRESS NOTE ADULT - PROVIDER SPECIALTY LIST ADULT
Nephrology
Nephrology
Cardiology
Cardiology
Internal Medicine
Nephrology
Nephrology
Cardiology
Nephrology
Cardiology
Intervent Cardiology
Internal Medicine

## 2023-06-04 NOTE — DISCHARGE NOTE NURSING/CASE MANAGEMENT/SOCIAL WORK - NSDCFUADDAPPT_GEN_ALL_CORE_FT
APPTS ARE READY TO BE MADE: [X] YES    Best Family or Patient Contact (if needed):      1: Follow up with PCP Dr. Kumar within 1 week of discharge   2: Follow up with cardiologist Dr. Moon within 1 week of discharge   3: Follow up with nephrologist Dr. Walton in 4 weeks outpatient

## 2023-06-04 NOTE — DISCHARGE NOTE NURSING/CASE MANAGEMENT/SOCIAL WORK - PATIENT PORTAL LINK FT
You can access the FollowMyHealth Patient Portal offered by [facility] by registering at the following website: [email or web address]. By joining [employer]’s FollowMyHealth portal, you will also be able to view your health information using other applications (apps) compatible with our system.

## 2023-06-04 NOTE — CONSULT NOTE ADULT - PROBLEM SELECTOR RECOMMENDATION 9
Will increase Lantus to 22 units at bed time.  Will start Admelog 5 units before each meal in addition to Admelog correction scale coverage.  Will continue monitoring FS, log, and glucose trends, will Follow up.    Suggest to DC on current basal insulin regimen and FU endo 2 weeks.  Discussed plan with patient. May use his home basal insulin Semglee.   Counseled on adjusting insulin dose based on blood sugar values.  Patient counseled for compliance with consistent low carb diet and exercise as tolerated outpatient.

## 2023-06-04 NOTE — CONSULT NOTE ADULT - SUBJECTIVE AND OBJECTIVE BOX
HPI:  69 yo M PMH pafib, CAD multiple PCIs w/ recent TITO 3/7/23 to LCx, CKD3 IDDM2 p/w L CP. States he began experiencing L CP day prior to admission while eating, worse with exertion, radiating to back, associated w some SOB. No diaphoresis, n/v, abd pain, f/chills. Says this was similar to prior CP episodes requiring LHC. Denies infectious complaints including diarrhea, dysuria, rash.    In the ED VS noted - afebrile, HR 120s afib  Meds received - hep gtt, brillinta, ppi iv, metoprolol iv, labetalol 10 mg iv   (28 May 2023 12:59)      Patient has history of diabetes, A1C 8.4 %  was on Semglee basal insulin at home.   Endo was consulted for glycemic control.      PAST MEDICAL & SURGICAL HISTORY:  Former smoker      CAD in native artery      Type 2 diabetes mellitus      Hyperlipidemia, unspecified hyperlipidemia type      Essential hypertension, hypertension with unspecified goal      Afib      Hematoma of leg      Stented coronary artery      CHF (congestive heart failure)      s/p Carotid Endarterectomy  left          FAMILY HISTORY:  Family history of heart disease  father  age 71    FH: atrial fibrillation  sister        Social History:  Former smoker  No ETOH use (28 May 2023 12:59)            HOME MEDICATIONS:  Home Medications:  dilTIAZem 180 mg/24 hours oral capsule, extended release: 1 cap(s) orally once a day (28 May 2023 08:29)  ezetimibe 10 mg oral tablet: 1 tab(s) orally once a day (28 May 2023 08:29)  metoprolol succinate 50 mg oral tablet, extended release: 1 tab(s) orally once a day (28 May 2023 08:29)  Semglee (Prefilled Pen) 100 units/mL subcutaneous solution: 30-40units subcutaneously once daily (28 May 2023 08:29)  Xarelto 20 mg oral tablet: 1 tab(s) orally once a day (before a meal) RESUME ON 3/8/23 (28 May 2023 08:29)            MEDICATIONS  (STANDING):  aspirin enteric coated 81 milliGRAM(s) Oral daily  atorvastatin 80 milliGRAM(s) Oral at bedtime  chlorhexidine 2% Cloths 1 Application(s) Topical daily  dextrose 5%. 1000 milliLiter(s) (50 mL/Hr) IV Continuous <Continuous>  dextrose 5%. 1000 milliLiter(s) (100 mL/Hr) IV Continuous <Continuous>  dextrose 50% Injectable 12.5 Gram(s) IV Push once  dextrose 50% Injectable 25 Gram(s) IV Push once  dextrose 50% Injectable 25 Gram(s) IV Push once  glucagon  Injectable 1 milliGRAM(s) IntraMuscular once  insulin glargine Injectable (LANTUS) 20 Unit(s) SubCutaneous at bedtime  insulin lispro (ADMELOG) corrective regimen sliding scale   SubCutaneous at bedtime  insulin lispro (ADMELOG) corrective regimen sliding scale   SubCutaneous three times a day before meals  metoprolol tartrate 50 milliGRAM(s) Oral every 12 hours  rivaroxaban 20 milliGRAM(s) Oral daily  sodium bicarbonate  Infusion 0.061 mEq/kG/Hr (50 mL/Hr) IV Continuous <Continuous>  sodium chloride 0.45% 1000 milliLiter(s) (100 mL/Hr) IV Continuous <Continuous>  valsartan 40 milliGRAM(s) Oral daily    MEDICATIONS  (PRN):  acetaminophen     Tablet .. 650 milliGRAM(s) Oral every 6 hours PRN Temp greater or equal to 38C (100.4F), Mild Pain (1 - 3)  aluminum hydroxide/magnesium hydroxide/simethicone Suspension 30 milliLiter(s) Oral every 4 hours PRN Dyspepsia  dextrose Oral Gel 15 Gram(s) Oral once PRN Blood Glucose LESS THAN 70 milliGRAM(s)/deciliter  dextrose Oral Gel 15 Gram(s) Oral once PRN Blood Glucose LESS THAN 70 milliGRAM(s)/deciliter  melatonin 3 milliGRAM(s) Oral at bedtime PRN Insomnia  ondansetron Injectable 4 milliGRAM(s) IV Push every 8 hours PRN Nausea and/or Vomiting      Allergies    No Known Allergies    Intolerances        Review of Systems:  Neuro: No HA, no dizziness  Cardiovascular: No chest pain, no palpitations  Respiratory: no SOB, no cough  GI: No nausea, vomiting, abdominal pain  MSK: Denies joint/muscle pain      ALL OTHER SYSTEMS REVIEWED AND NEGATIVE    PHYSICAL EXAM:  VITALS: T(C): 36.6 (23 @ 12:05)  T(F): 97.8 (23 @ 12:05), Max: 98.9 (23 @ 20:43)  HR: 91 (23 @ 12:05) (80 - 91)  BP: 121/72 (23 @ 12:05) (121/72 - 147/62)  RR:  (18 - 18)  SpO2:  (95% - 100%)  Wt(kg): --  GENERAL: NAD, well-groomed, well-developed  NEURO:  alert and oriented  RESPIRATORY: Clear to auscultation bilaterally; No rales, rhonchi, wheezing  CARDIOVASCULAR: Si S2  GI: Soft, non distended, normal bowel sounds  MUSCULOSKELETAL: Moves all extremities equally       POCT Blood Glucose.: 263 mg/dL (23 @ 11:48)  POCT Blood Glucose.: 158 mg/dL (23 @ 07:42)  POCT Blood Glucose.: 246 mg/dL (23 @ 21:10)  POCT Blood Glucose.: 196 mg/dL (23 @ 17:23)  POCT Blood Glucose.: 236 mg/dL (23 @ 11:42)  POCT Blood Glucose.: 164 mg/dL (23 @ 07:45)  POCT Blood Glucose.: 212 mg/dL (23 @ 22:57)  POCT Blood Glucose.: 210 mg/dL (23 @ 21:06)  POCT Blood Glucose.: 146 mg/dL (23 @ 18:23)  POCT Blood Glucose.: 162 mg/dL (23 @ 12:57)  POCT Blood Glucose.: 311 mg/dL (23 @ 12:06)  POCT Blood Glucose.: 146 mg/dL (23 @ 07:58)  POCT Blood Glucose.: 194 mg/dL (23 @ 21:11)  POCT Blood Glucose.: 125 mg/dL (23 @ 17:02)                            13.5   9.14  )-----------( 304      ( 2023 06:28 )             39.7           139  |  106  |  31<H>  ----------------------------<  145<H>  4.3   |  19<L>  |  1.50<H>    eGFR: 51<L>    Ca    9.5              Thyroid Function Tests:    Diet, Consistent Carbohydrate w/Evening Snack:   DASH/TLC Sodium & Cholesterol Restricted (DASH) (23 @ 17:36) [Active]         Chol 104 Direct LDL -- LDL calculated 45 HDL 38<L> Trig 105  A1C with Estimated Average Glucose Result: 8.4 % (23 @ 07:28)  A1C with Estimated Average Glucose Result: 8.2 % (23 @ 12:15)                   HPI:  67 yo M PMH pafib, CAD multiple PCIs w/ recent TITO 3/7/23 to LCx, CKD3 IDDM2 p/w L CP. States he began experiencing L CP day prior to admission while eating, worse with exertion, radiating to back, associated w some SOB. No diaphoresis, n/v, abd pain, f/chills. Says this was similar to prior CP episodes requiring LHC. Denies infectious complaints including diarrhea, dysuria, rash.    In the ED VS noted - afebrile, HR 120s afib  Meds received - hep gtt, brillinta, ppi iv, metoprolol iv, labetalol 10 mg iv   (28 May 2023 12:59)      Patient has history of diabetes, A1C 8.4 %  was on Semglee basal insulin at home.   Endo was consulted for glycemic control.      PAST MEDICAL & SURGICAL HISTORY:  Former smoker      CAD in native artery      Type 2 diabetes mellitus      Hyperlipidemia, unspecified hyperlipidemia type      Essential hypertension, hypertension with unspecified goal      Afib      Hematoma of leg      Stented coronary artery      CHF (congestive heart failure)      s/p Carotid Endarterectomy  left          FAMILY HISTORY:  Family history of heart disease  father  age 71    FH: atrial fibrillation  sister        Social History:  Former smoker  No ETOH use (28 May 2023 12:59)            HOME MEDICATIONS:  Home Medications:  dilTIAZem 180 mg/24 hours oral capsule, extended release: 1 cap(s) orally once a day (28 May 2023 08:29)  ezetimibe 10 mg oral tablet: 1 tab(s) orally once a day (28 May 2023 08:29)  metoprolol succinate 50 mg oral tablet, extended release: 1 tab(s) orally once a day (28 May 2023 08:29)  Semglee (Prefilled Pen) 100 units/mL subcutaneous solution: 30-40units subcutaneously once daily (28 May 2023 08:29)  Xarelto 20 mg oral tablet: 1 tab(s) orally once a day (before a meal) RESUME ON 3/8/23 (28 May 2023 08:29)            MEDICATIONS  (STANDING):  aspirin enteric coated 81 milliGRAM(s) Oral daily  atorvastatin 80 milliGRAM(s) Oral at bedtime  chlorhexidine 2% Cloths 1 Application(s) Topical daily  dextrose 5%. 1000 milliLiter(s) (50 mL/Hr) IV Continuous <Continuous>  dextrose 5%. 1000 milliLiter(s) (100 mL/Hr) IV Continuous <Continuous>  dextrose 50% Injectable 12.5 Gram(s) IV Push once  dextrose 50% Injectable 25 Gram(s) IV Push once  dextrose 50% Injectable 25 Gram(s) IV Push once  glucagon  Injectable 1 milliGRAM(s) IntraMuscular once  insulin glargine Injectable (LANTUS) 20 Unit(s) SubCutaneous at bedtime  insulin lispro (ADMELOG) corrective regimen sliding scale   SubCutaneous at bedtime  insulin lispro (ADMELOG) corrective regimen sliding scale   SubCutaneous three times a day before meals  metoprolol tartrate 50 milliGRAM(s) Oral every 12 hours  rivaroxaban 20 milliGRAM(s) Oral daily  sodium bicarbonate  Infusion 0.061 mEq/kG/Hr (50 mL/Hr) IV Continuous <Continuous>  sodium chloride 0.45% 1000 milliLiter(s) (100 mL/Hr) IV Continuous <Continuous>  valsartan 40 milliGRAM(s) Oral daily    MEDICATIONS  (PRN):  acetaminophen     Tablet .. 650 milliGRAM(s) Oral every 6 hours PRN Temp greater or equal to 38C (100.4F), Mild Pain (1 - 3)  aluminum hydroxide/magnesium hydroxide/simethicone Suspension 30 milliLiter(s) Oral every 4 hours PRN Dyspepsia  dextrose Oral Gel 15 Gram(s) Oral once PRN Blood Glucose LESS THAN 70 milliGRAM(s)/deciliter  dextrose Oral Gel 15 Gram(s) Oral once PRN Blood Glucose LESS THAN 70 milliGRAM(s)/deciliter  melatonin 3 milliGRAM(s) Oral at bedtime PRN Insomnia  ondansetron Injectable 4 milliGRAM(s) IV Push every 8 hours PRN Nausea and/or Vomiting      Allergies    No Known Allergies    Intolerances        Review of Systems:  Neuro: No HA, no dizziness  Cardiovascular: No chest pain, no palpitations  Respiratory: no SOB, no cough  GI: No nausea, vomiting, abdominal pain  MSK: Denies joint/muscle pain      ALL OTHER SYSTEMS REVIEWED AND NEGATIVE    PHYSICAL EXAM:  VITALS: T(C): 36.6 (23 @ 12:05)  T(F): 97.8 (23 @ 12:05), Max: 98.9 (23 @ 20:43)  HR: 91 (23 @ 12:05) (80 - 91)  BP: 121/72 (23 @ 12:05) (121/72 - 147/62)  RR:  (18 - 18)  SpO2:  (95% - 100%)  Wt(kg): --  GENERAL: NAD, well-groomed, well-developed  NEURO:  alert and oriented  RESPIRATORY: Clear to auscultation bilaterally; No rales, rhonchi, wheezing  CARDIOVASCULAR: Si S2  GI: Soft, non distended, normal bowel sounds  MUSCULOSKELETAL: Moves all extremities equally       POCT Blood Glucose.: 263 mg/dL (23 @ 11:48)  POCT Blood Glucose.: 158 mg/dL (23 @ 07:42)  POCT Blood Glucose.: 246 mg/dL (23 @ 21:10)  POCT Blood Glucose.: 196 mg/dL (23 @ 17:23)  POCT Blood Glucose.: 236 mg/dL (23 @ 11:42)  POCT Blood Glucose.: 164 mg/dL (23 @ 07:45)  POCT Blood Glucose.: 212 mg/dL (23 @ 22:57)  POCT Blood Glucose.: 210 mg/dL (23 @ 21:06)  POCT Blood Glucose.: 146 mg/dL (23 @ 18:23)  POCT Blood Glucose.: 162 mg/dL (23 @ 12:57)  POCT Blood Glucose.: 311 mg/dL (23 @ 12:06)  POCT Blood Glucose.: 146 mg/dL (23 @ 07:58)  POCT Blood Glucose.: 194 mg/dL (23 @ 21:11)  POCT Blood Glucose.: 125 mg/dL (23 @ 17:02)                            13.5   9.14  )-----------( 304      ( 2023 06:28 )             39.7           139  |  106  |  31<H>  ----------------------------<  145<H>  4.3   |  19<L>  |  1.50<H>    eGFR: 51<L>    Ca    9.5              Thyroid Function Tests:    Diet, Consistent Carbohydrate w/Evening Snack:   DASH/TLC Sodium & Cholesterol Restricted (DASH) (23 @ 17:36) [Active]         Chol 104 Direct LDL -- LDL calculated 45 HDL 38<L> Trig 105  A1C with Estimated Average Glucose Result: 8.4 % (23 @ 07:28)  A1C with Estimated Average Glucose Result: 8.2 % (23 @ 12:15)

## 2023-06-04 NOTE — DISCHARGE NOTE NURSING/CASE MANAGEMENT/SOCIAL WORK - NSDCPEFALRISK_GEN_ALL_CORE
For information on Fall & Injury Prevention, visit: [email or web address] OR  [email or web address] OR  https://www.cdc.gov/steadi/patient.html

## 2023-06-04 NOTE — CONSULT NOTE ADULT - PROBLEM SELECTOR RECOMMENDATION 2
On medications,  no chest pain, stable, monitored and followed up by primary team/cardiology team. s/p PCI to LM
Yes-Patient/Caregiver accepts free interpretation services...

## 2023-06-04 NOTE — CONSULT NOTE ADULT - ASSESSMENT
Assessment  DMT2: 68y Male with DM T2 with hyperglycemia, A1C 8.2%, was on insulin at home, now  started on basal bolus insulin with coverage, blood sugars running high.  Eating meals. s/p PCI  CAD/CHF: on medications, stable, monitored. Hx of multiple stents, now s/p PCI TITO to ostial LM  HTN: on antihypertensive medications, monitored, asymptomatic.  HLD: On statin, diet controlled.   CKD:  Monitor labs/BMP    Discussed plan and management wit Dr Flo Rossi MD  Cell: [phone]  Office: [phone]               Assessment  DMT2: 68y Male with DM T2 with hyperglycemia, A1C 8.2%, was on insulin at home, now  started on basal bolus insulin with coverage, blood sugars running high. Eating meals. s/p PCI  CAD/CHF: on medications, stable, monitored. Hx of multiple stents, now s/p PCI TITO to ostial LM  HTN: on antihypertensive medications, monitored, asymptomatic.  HLD: On statin, diet controlled.   CKD:  Monitor labs/BMP    Discussed plan and management wit Dr Flo Rossi MD  Cell: [phone]  Office: [phone]

## 2023-06-04 NOTE — PROGRESS NOTE ADULT - SUBJECTIVE AND OBJECTIVE BOX
No complaints  /72  HR 80  Lungs clear  Irregular rhythm  No edema      BUN 31  Crt 1.50  CBC  normal    Imp:  NSTEMI - S/P PCI of an ostial LM  Crt is stable   Patient is stable for DC to home on ASA 81 mg qd  Plavix 75 mg qd  Xarelto 20 mg qd for 1 week and then  Plavix 75 mg qd and Xarelto 20 mg qd  Continue present dosing of Metoprolol and Valsartan

## 2023-06-05 VITALS
DIASTOLIC BLOOD PRESSURE: 76 MMHG | HEART RATE: 77 BPM | TEMPERATURE: 98 F | RESPIRATION RATE: 18 BRPM | SYSTOLIC BLOOD PRESSURE: 150 MMHG | OXYGEN SATURATION: 96 %

## 2023-07-28 ENCOUNTER — APPOINTMENT (OUTPATIENT)
Dept: HEART FAILURE | Facility: CLINIC | Age: 68
End: 2023-07-28

## 2023-07-28 NOTE — CARDIOLOGY SUMMARY
[de-identified] : \par 7/28/23 -  [de-identified] : \par 2/23/23 - treadmill nuclear test - 7.7 METs, EF 47%; frequent PACs and PVCs with exercise; no anginal symptoms but reported dyspnea and fatigue; mild-mod inferolateral wall defect reversible c/w ischemia [de-identified] : \par 3/1/22 - EF 45%, LVEDD 4.2 cm, /69; mild-mod MR, segmental LV dysfunction (basal inferior/inferoseptum/mild inferolateral hypokinesis), severe diastolic dysfunction, RVSP 36\par \par 2/28/20 - YVES - EF 53%, mild-mod MR, dense echo contrast in JOSE w/o thrombus with Definitiy; no PFO [de-identified] : \par 6/2/23 - 70% ostial LM s/p PCI\par 5/31/23 - severe ostial M CAD; mild-mod ISR of LAD and LCx; D1 70% ostial disease; distal LAD 65% stenosis; RPDA 75% ostial disease; LVEDP 18\par 3/7/23 - pLAD 30% stenosis; prox LCx 90% stenosis s/p PCI; OM1 small with severe atherosclerosis, Ramus 100% stenosis; distal RCA small and 80% stenosis\par 2/28/22 - LM 30%; mLAD 70% (IFR 0.7) s/p PCI; prox LCx 40% stenosis \par \par 5/2/16 - pLAD 90% s/p PCI; mLAD 40%; D1 50%\par 12/21/15 - D1 90% s/p PCI; Ramus 70% s/p PCI; /76\par

## 2023-07-28 NOTE — HISTORY OF PRESENT ILLNESS
[FreeTextEntry1] : Mr. Velázquez is a 69 y/o M w/ h/o CAD s/p multiple PCI (last 3/23 to LCx), afib (on AC), carotid artery stenosis, HTN, DM (A1c 8.4 6/23) c/b peripheral neuropathy who presents for establishment of care. Referred by Dr. Moon.\par \par Per patient, cardiac history began in 2015 when had SOB and was found to have CAD requiring PCI. In 2016, noted rapid heart beat and was found to have atrial fibrillation. Was \par \par Was last admitted to hospital from 5/28-6/4 for chest pain and was found to have NSTEMI in setting of afib with rapid ventricular rate. Was medically managed and underwent LHC 6/2 which showed ostail LM 70% and underwent PCI

## 2023-07-30 ENCOUNTER — EMERGENCY (EMERGENCY)
Facility: HOSPITAL | Age: 68
LOS: 1 days | Discharge: HOME | End: 2023-07-30
Attending: EMERGENCY MEDICINE
Payer: COMMERCIAL

## 2023-07-30 VITALS
SYSTOLIC BLOOD PRESSURE: 156 MMHG | DIASTOLIC BLOOD PRESSURE: 79 MMHG | RESPIRATION RATE: 18 BRPM | HEART RATE: 81 BPM | OXYGEN SATURATION: 99 % | TEMPERATURE: 98 F

## 2023-07-30 VITALS
TEMPERATURE: 100 F | OXYGEN SATURATION: 96 % | RESPIRATION RATE: 18 BRPM | SYSTOLIC BLOOD PRESSURE: 163 MMHG | WEIGHT: 139.99 LBS | HEART RATE: 99 BPM | HEIGHT: 66 IN | DIASTOLIC BLOOD PRESSURE: 83 MMHG

## 2023-07-30 LAB
ALBUMIN SERPL ELPH-MCNC: 4.5 G/DL — SIGNIFICANT CHANGE UP (ref 3.3–5)
ALP SERPL-CCNC: 120 U/L — SIGNIFICANT CHANGE UP (ref 40–120)
ALT FLD-CCNC: 32 U/L — SIGNIFICANT CHANGE UP (ref 10–45)
ANION GAP SERPL CALC-SCNC: 17 MMOL/L — SIGNIFICANT CHANGE UP (ref 5–17)
AST SERPL-CCNC: 69 U/L — HIGH (ref 10–40)
BASE EXCESS BLDV CALC-SCNC: -0.6 MMOL/L — SIGNIFICANT CHANGE UP (ref -2–3)
BASOPHILS # BLD AUTO: 0.03 K/UL — SIGNIFICANT CHANGE UP (ref 0–0.2)
BASOPHILS NFR BLD AUTO: 0.3 % — SIGNIFICANT CHANGE UP (ref 0–2)
BILIRUB SERPL-MCNC: 2.2 MG/DL — HIGH (ref 0.2–1.2)
BUN SERPL-MCNC: 29 MG/DL — HIGH (ref 7–23)
CA-I SERPL-SCNC: 1.19 MMOL/L — SIGNIFICANT CHANGE UP (ref 1.15–1.33)
CALCIUM SERPL-MCNC: 9.6 MG/DL — SIGNIFICANT CHANGE UP (ref 8.4–10.5)
CHLORIDE BLDV-SCNC: 101 MMOL/L — SIGNIFICANT CHANGE UP (ref 96–108)
CHLORIDE SERPL-SCNC: 101 MMOL/L — SIGNIFICANT CHANGE UP (ref 96–108)
CO2 BLDV-SCNC: 28 MMOL/L — HIGH (ref 22–26)
CO2 SERPL-SCNC: 20 MMOL/L — LOW (ref 22–31)
CREAT SERPL-MCNC: 1.61 MG/DL — HIGH (ref 0.5–1.3)
EGFR: 47 ML/MIN/1.73M2 — LOW
EOSINOPHIL # BLD AUTO: 0.1 K/UL — SIGNIFICANT CHANGE UP (ref 0–0.5)
EOSINOPHIL NFR BLD AUTO: 1.1 % — SIGNIFICANT CHANGE UP (ref 0–6)
GAS PNL BLDV: 128 MMOL/L — LOW (ref 136–145)
GAS PNL BLDV: SIGNIFICANT CHANGE UP
GAS PNL BLDV: SIGNIFICANT CHANGE UP
GLUCOSE BLDV-MCNC: 141 MG/DL — HIGH (ref 70–99)
GLUCOSE SERPL-MCNC: 141 MG/DL — HIGH (ref 70–99)
HCO3 BLDV-SCNC: 26 MMOL/L — SIGNIFICANT CHANGE UP (ref 22–29)
HCT VFR BLD CALC: 48.4 % — SIGNIFICANT CHANGE UP (ref 39–50)
HCT VFR BLDA CALC: 44 % — SIGNIFICANT CHANGE UP (ref 39–51)
HGB BLD CALC-MCNC: 14.6 G/DL — SIGNIFICANT CHANGE UP (ref 12.6–17.4)
HGB BLD-MCNC: 16.3 G/DL — SIGNIFICANT CHANGE UP (ref 13–17)
IMM GRANULOCYTES NFR BLD AUTO: 0.3 % — SIGNIFICANT CHANGE UP (ref 0–0.9)
LACTATE BLDV-MCNC: 1.3 MMOL/L — SIGNIFICANT CHANGE UP (ref 0.5–2)
LYMPHOCYTES # BLD AUTO: 1.55 K/UL — SIGNIFICANT CHANGE UP (ref 1–3.3)
LYMPHOCYTES # BLD AUTO: 17.1 % — SIGNIFICANT CHANGE UP (ref 13–44)
MAGNESIUM SERPL-MCNC: 2.2 MG/DL — SIGNIFICANT CHANGE UP (ref 1.6–2.6)
MCHC RBC-ENTMCNC: 31.3 PG — SIGNIFICANT CHANGE UP (ref 27–34)
MCHC RBC-ENTMCNC: 33.7 GM/DL — SIGNIFICANT CHANGE UP (ref 32–36)
MCV RBC AUTO: 92.9 FL — SIGNIFICANT CHANGE UP (ref 80–100)
MONOCYTES # BLD AUTO: 0.94 K/UL — HIGH (ref 0–0.9)
MONOCYTES NFR BLD AUTO: 10.4 % — SIGNIFICANT CHANGE UP (ref 2–14)
NEUTROPHILS # BLD AUTO: 6.39 K/UL — SIGNIFICANT CHANGE UP (ref 1.8–7.4)
NEUTROPHILS NFR BLD AUTO: 70.8 % — SIGNIFICANT CHANGE UP (ref 43–77)
NRBC # BLD: 0 /100 WBCS — SIGNIFICANT CHANGE UP (ref 0–0)
PCO2 BLDV: 51 MMHG — SIGNIFICANT CHANGE UP (ref 42–55)
PH BLDV: 7.32 — SIGNIFICANT CHANGE UP (ref 7.32–7.43)
PHOSPHATE SERPL-MCNC: 5 MG/DL — HIGH (ref 2.5–4.5)
PLATELET # BLD AUTO: 243 K/UL — SIGNIFICANT CHANGE UP (ref 150–400)
PO2 BLDV: 18 MMHG — LOW (ref 25–45)
POTASSIUM BLDV-SCNC: 4.6 MMOL/L — SIGNIFICANT CHANGE UP (ref 3.5–5.1)
POTASSIUM SERPL-MCNC: 4.8 MMOL/L — SIGNIFICANT CHANGE UP (ref 3.5–5.3)
POTASSIUM SERPL-MCNC: 5.9 MMOL/L — HIGH (ref 3.5–5.3)
POTASSIUM SERPL-SCNC: 4.8 MMOL/L — SIGNIFICANT CHANGE UP (ref 3.5–5.3)
POTASSIUM SERPL-SCNC: 5.9 MMOL/L — HIGH (ref 3.5–5.3)
PROT SERPL-MCNC: 7.7 G/DL — SIGNIFICANT CHANGE UP (ref 6–8.3)
RBC # BLD: 5.21 M/UL — SIGNIFICANT CHANGE UP (ref 4.2–5.8)
RBC # FLD: 13.2 % — SIGNIFICANT CHANGE UP (ref 10.3–14.5)
SAO2 % BLDV: 20.1 % — LOW (ref 67–88)
SODIUM SERPL-SCNC: 138 MMOL/L — SIGNIFICANT CHANGE UP (ref 135–145)
WBC # BLD: 9.04 K/UL — SIGNIFICANT CHANGE UP (ref 3.8–10.5)
WBC # FLD AUTO: 9.04 K/UL — SIGNIFICANT CHANGE UP (ref 3.8–10.5)

## 2023-07-30 PROCEDURE — 83735 ASSAY OF MAGNESIUM: CPT

## 2023-07-30 PROCEDURE — 84295 ASSAY OF SERUM SODIUM: CPT

## 2023-07-30 PROCEDURE — 99285 EMERGENCY DEPT VISIT HI MDM: CPT

## 2023-07-30 PROCEDURE — 99284 EMERGENCY DEPT VISIT MOD MDM: CPT | Mod: 25

## 2023-07-30 PROCEDURE — 84100 ASSAY OF PHOSPHORUS: CPT

## 2023-07-30 PROCEDURE — 85014 HEMATOCRIT: CPT

## 2023-07-30 PROCEDURE — 85025 COMPLETE CBC W/AUTO DIFF WBC: CPT

## 2023-07-30 PROCEDURE — 80053 COMPREHEN METABOLIC PANEL: CPT

## 2023-07-30 PROCEDURE — 36415 COLL VENOUS BLD VENIPUNCTURE: CPT

## 2023-07-30 PROCEDURE — 83605 ASSAY OF LACTIC ACID: CPT

## 2023-07-30 PROCEDURE — 84132 ASSAY OF SERUM POTASSIUM: CPT

## 2023-07-30 PROCEDURE — 85018 HEMOGLOBIN: CPT

## 2023-07-30 PROCEDURE — 93005 ELECTROCARDIOGRAM TRACING: CPT

## 2023-07-30 PROCEDURE — 82435 ASSAY OF BLOOD CHLORIDE: CPT

## 2023-07-30 PROCEDURE — 82330 ASSAY OF CALCIUM: CPT

## 2023-07-30 PROCEDURE — 82803 BLOOD GASES ANY COMBINATION: CPT

## 2023-07-30 PROCEDURE — 82947 ASSAY GLUCOSE BLOOD QUANT: CPT

## 2023-07-30 NOTE — ED ADULT NURSE NOTE - OBJECTIVE STATEMENT
pt 67 yo male hx diabetes cardiac with 6 stents last one March presents stating that he had routine labs drawn 2 days ago anf MD called him to say his potassium level was elevated pt denies any chest discomfort on arrival to gold placed on cardiac monitor skin warm dry pt afib on EKG pt 67 yo male hx diabetes cardiac with 6 stents last one March presents stating that he had routine labs drawn 2 days ago anf MD called him to say his potassium level was elevated pt denies any chest discomfort on arrival to gold placed on cardiac monitor skin warm dry pt afib on EKG pt states in Afib since March MI

## 2023-07-30 NOTE — ED PROVIDER NOTE - OBJECTIVE STATEMENT
Patient is a 68-year-old male history of CAD status post stents this May, CHF, A-fib on Xarelto, presenting with potassium of 6.6 outpatient.  Patient states he was seeing a new primary doctor and got labs and was told that his potassium was high and he had to go to the emergency room.  Denies chest pain difficulty breathing, leg swelling, fevers, syncope, palpitations.

## 2023-07-30 NOTE — ED PROVIDER NOTE - PATIENT PORTAL LINK FT
You can access the FollowMyHealth Patient Portal offered by [facility] by registering at the following website: [email or web address]. By joining [facility]’s FollowMyHealth portal, you will also be able to view your health information using other applications (apps) compatible with our system.

## 2023-07-30 NOTE — ED PROVIDER NOTE - NSFOLLOWUPINSTRUCTIONS_ED_ALL_ED_FT
You were seen in the ED with an abnormal lab result. On repeat labs in the ED today your potassium was normal.    Please follow-up with your primary care doctor this week.  Continue home medications as prescribed.    ***Return to the ED if you have any new or worsening symptoms such as chest pain, difficutly breathing, or any other concerning symptoms***

## 2023-07-30 NOTE — ED PROVIDER NOTE - PHYSICAL EXAMINATION
GENERAL: no acute distress, non-toxic appearing  HEAD: normocephalic, atraumatic  HEENT: PERRLA, EOMI, normal conjunctiva  CARDIAC: irregular rhythm, regular rate  PULM: clear to ascultation bilaterally, no crackles, rales, rhonchi, or wheezing  GI: abdomen nondistended, soft, nontender  NEURO: alert and oriented x 3, normal speech, no gross neurologic deficit  MSK: no visible deformities, no peripheral edema, calf tenderness/redness/swelling  SKIN: no visible rashes, dry, well-perfused  PSYCH: appropriate mood and affect

## 2023-07-30 NOTE — ED PROVIDER NOTE - ATTENDING CONTRIBUTION TO CARE
Patient with elevated K as an outpatient without any change in UO or any dehydration  no medical complaint  Richard Carey MD, FACEP: In this physician's medical judgement based on clinical history and physical exam the patient's signs and symptoms lead to differential diagnoses which includes but is not limited to: pseudohyperkalemia     Historical features, symptoms, and clinical exam not consistent with: jesika    Labs were ordered and independently reviewed by me.  EKG was ordered and independently reviewed by me.  Imaging was ordered and reviewed by me.      Appropriate medications for the patient's presenting complaints were ordered, and effects were reassessed.     Patient's records including prior hospital visit, med and medical history were reviewed.   Escalation to admission/observation was considered. However, labs normal in emergency department and patient better served with outpatient primary medical doctor and/or specialist and given strict return precautions and expectant management.     Will follow up on labs, therapeutics, imaging, reassess and disposition as clinically indicated.  *The above represents an initial assessment/impression. Please refer to my progress notes below for potential changes in patient clinical course*     The patient was serially evaluated throughout emergency department course by the team. There was no acute deterioration up to this time in the emergency department. The patient has demonstrated clinical improvement and/or stability, feels better at this time according to emergency department team. Agree with goals/plan of emergency department care as described in this physician's electronic medical record, including diagnostics, therapeutics and consultation recommendation as clinically warranted. Will discharge home with close outpatient follow up with primary care physician/provider and specialist if necessary. The patient and/or family was educated on expectant management and return precautions concerning signs and features to return to the emergency department, in layman terms, including but not limited to: nausea, vomiting, fever, chills, the inability to eat, take medications, or drink, persistent/worsening symptoms or any concerns at all. There are no acute or immediate life threatening issues present on history, clinical exam, or any diagnostic evaluation. The patient is a safe disposition home, has capacity and insight into their condition, is ambulatory in the Emergency Department with no further questions and will follow up with their doctor(s) this week. Diagnosis, prognosis, natural history and treatment was discussed with patient and/or family. The patient and/or family were given the opportunity to ask questions and have them answered in full. The patient and/or family are with capacity and insight into the situation, treatment, risks, benefits, alternative therapies, and understand that they can ask any further questions if needed. Patient and/or family/guardian understands anticipatory guidance and was given strict return and follow up precautions. The patient and/or family/guardian has been informed of the necessity to follow up with the PMD/Clinic/follow up as provided within 2-3 days, and the patient and/or family/guardian reports understanding of above with capacity and insight. The patient and/or family/guardian were informed of any results of their tests and are were encouraged to follow up on the findings with their doctor as well as the need to inform their doctor of any results. The patient and/or family/guardian are aware of the need to follow up with repeat testing as applicable and report understanding of the above with capacity and insight. The patient and/or family/guardian was made aware of any pending test results at the time of discharge and of the need to call back for the final results as well as the need to inform their doctor of the results.

## 2023-07-30 NOTE — ED PROVIDER NOTE - CLINICAL SUMMARY MEDICAL DECISION MAKING FREE TEXT BOX
Elizabeth Paulson MD PGY3: Will repeat blood work, EKG shows similar findings to previous.  Patient is in A-fib, has some depressions consistent with prior EKG.

## 2023-07-30 NOTE — ED PROVIDER NOTE - PROGRESS NOTE DETAILS
Elizabeth Paulson MD PGY3: K initially hemolyzed, repeat sent, resulted 4.8, will dc PCP fu. Patient remains asymptomatic.

## 2023-08-01 DIAGNOSIS — Z00.00 ENCOUNTER FOR GENERAL ADULT MEDICAL EXAMINATION WITHOUT ABNORMAL FINDINGS: ICD-10-CM

## 2023-08-01 DIAGNOSIS — Z79.01 LONG TERM (CURRENT) USE OF ANTICOAGULANTS: ICD-10-CM

## 2023-08-01 DIAGNOSIS — Z79.82 LONG TERM (CURRENT) USE OF ASPIRIN: ICD-10-CM

## 2023-08-01 DIAGNOSIS — Z95.5 PRESENCE OF CORONARY ANGIOPLASTY IMPLANT AND GRAFT: ICD-10-CM

## 2023-08-01 DIAGNOSIS — Z79.02 LONG TERM (CURRENT) USE OF ANTITHROMBOTICS/ANTIPLATELETS: ICD-10-CM

## 2023-08-01 DIAGNOSIS — I11.0 HYPERTENSIVE HEART DISEASE WITH HEART FAILURE: ICD-10-CM

## 2023-08-01 DIAGNOSIS — I25.10 ATHEROSCLEROTIC HEART DISEASE OF NATIVE CORONARY ARTERY WITHOUT ANGINA PECTORIS: ICD-10-CM

## 2023-08-01 DIAGNOSIS — I48.91 UNSPECIFIED ATRIAL FIBRILLATION: ICD-10-CM

## 2023-08-01 DIAGNOSIS — E11.9 TYPE 2 DIABETES MELLITUS WITHOUT COMPLICATIONS: ICD-10-CM

## 2023-08-01 DIAGNOSIS — I50.9 HEART FAILURE, UNSPECIFIED: ICD-10-CM

## 2023-08-01 DIAGNOSIS — Z87.891 PERSONAL HISTORY OF NICOTINE DEPENDENCE: ICD-10-CM

## 2023-08-01 DIAGNOSIS — E78.5 HYPERLIPIDEMIA, UNSPECIFIED: ICD-10-CM

## 2023-10-05 ENCOUNTER — APPOINTMENT (OUTPATIENT)
Dept: HEART FAILURE | Facility: CLINIC | Age: 68
End: 2023-10-05

## 2023-10-27 NOTE — PATIENT PROFILE ADULT - FALL HARM RISK - FACTORS NURSING JUDGEMENT
Done--  Gilberto Stahl MD        [facility]    Phone Message    May a detailed message be left on voicemail: yes     Reason for Call: Medication Refill Request    Has the patient contacted the pharmacy for the refill? Yes   Name of medication being requested: oxyBUTYnin ER (DITROPAN XL) 5 MG    Provider who prescribed the medication: Gilberto Stahl MD  Pharmacy: [id number] [address] AT [facility] & [address]    Date medication is needed: 10/30    Pt is calling to get medication prescribed to 10Mg as it was discussed with Greyson. Pt is currently on 5mg. Please advise pt thank you.      Action Taken: Message routed to:  Other: URO    Travel Screening: Not Applicable                                                                    No

## 2023-11-09 ENCOUNTER — APPOINTMENT (OUTPATIENT)
Dept: HEART FAILURE | Facility: CLINIC | Age: 68
End: 2023-11-09

## 2023-11-24 ENCOUNTER — INPATIENT (INPATIENT)
Facility: HOSPITAL | Age: 68
LOS: 4 days | Discharge: HOME | DRG: 250 | End: 2023-11-29
Attending: GENERAL ACUTE CARE HOSPITAL | Admitting: GENERAL ACUTE CARE HOSPITAL
Payer: COMMERCIAL

## 2023-11-24 VITALS
RESPIRATION RATE: 22 BRPM | SYSTOLIC BLOOD PRESSURE: 187 MMHG | HEIGHT: 66 IN | HEART RATE: 102 BPM | DIASTOLIC BLOOD PRESSURE: 104 MMHG | WEIGHT: 139.99 LBS | OXYGEN SATURATION: 100 %

## 2023-11-24 LAB
ALBUMIN SERPL ELPH-MCNC: 4.8 G/DL — SIGNIFICANT CHANGE UP (ref 3.3–5)
ALBUMIN SERPL ELPH-MCNC: 4.8 G/DL — SIGNIFICANT CHANGE UP (ref 3.3–5)
ALP SERPL-CCNC: 135 U/L — HIGH (ref 40–120)
ALP SERPL-CCNC: 135 U/L — HIGH (ref 40–120)
ALT FLD-CCNC: 21 U/L — SIGNIFICANT CHANGE UP (ref 10–45)
ALT FLD-CCNC: 21 U/L — SIGNIFICANT CHANGE UP (ref 10–45)
ANION GAP SERPL CALC-SCNC: 15 MMOL/L — SIGNIFICANT CHANGE UP (ref 5–17)
ANION GAP SERPL CALC-SCNC: 15 MMOL/L — SIGNIFICANT CHANGE UP (ref 5–17)
AST SERPL-CCNC: 26 U/L — SIGNIFICANT CHANGE UP (ref 10–40)
AST SERPL-CCNC: 26 U/L — SIGNIFICANT CHANGE UP (ref 10–40)
BASOPHILS # BLD AUTO: 0.04 K/UL — SIGNIFICANT CHANGE UP (ref 0–0.2)
BASOPHILS # BLD AUTO: 0.04 K/UL — SIGNIFICANT CHANGE UP (ref 0–0.2)
BASOPHILS NFR BLD AUTO: 0.3 % — SIGNIFICANT CHANGE UP (ref 0–2)
BASOPHILS NFR BLD AUTO: 0.3 % — SIGNIFICANT CHANGE UP (ref 0–2)
BILIRUB SERPL-MCNC: 1.3 MG/DL — HIGH (ref 0.2–1.2)
BILIRUB SERPL-MCNC: 1.3 MG/DL — HIGH (ref 0.2–1.2)
BUN SERPL-MCNC: 24 MG/DL — HIGH (ref 7–23)
BUN SERPL-MCNC: 24 MG/DL — HIGH (ref 7–23)
CALCIUM SERPL-MCNC: 10 MG/DL — SIGNIFICANT CHANGE UP (ref 8.4–10.5)
CALCIUM SERPL-MCNC: 10 MG/DL — SIGNIFICANT CHANGE UP (ref 8.4–10.5)
CHLORIDE SERPL-SCNC: 102 MMOL/L — SIGNIFICANT CHANGE UP (ref 96–108)
CHLORIDE SERPL-SCNC: 102 MMOL/L — SIGNIFICANT CHANGE UP (ref 96–108)
CO2 SERPL-SCNC: 23 MMOL/L — SIGNIFICANT CHANGE UP (ref 22–31)
CO2 SERPL-SCNC: 23 MMOL/L — SIGNIFICANT CHANGE UP (ref 22–31)
CREAT SERPL-MCNC: 1.71 MG/DL — HIGH (ref 0.5–1.3)
CREAT SERPL-MCNC: 1.71 MG/DL — HIGH (ref 0.5–1.3)
EGFR: 44 ML/MIN/1.73M2 — LOW
EGFR: 44 ML/MIN/1.73M2 — LOW
EOSINOPHIL # BLD AUTO: 0.14 K/UL — SIGNIFICANT CHANGE UP (ref 0–0.5)
EOSINOPHIL # BLD AUTO: 0.14 K/UL — SIGNIFICANT CHANGE UP (ref 0–0.5)
EOSINOPHIL NFR BLD AUTO: 1.2 % — SIGNIFICANT CHANGE UP (ref 0–6)
EOSINOPHIL NFR BLD AUTO: 1.2 % — SIGNIFICANT CHANGE UP (ref 0–6)
GLUCOSE SERPL-MCNC: 236 MG/DL — HIGH (ref 70–99)
GLUCOSE SERPL-MCNC: 236 MG/DL — HIGH (ref 70–99)
HCT VFR BLD CALC: 52.6 % — HIGH (ref 39–50)
HCT VFR BLD CALC: 52.6 % — HIGH (ref 39–50)
HGB BLD-MCNC: 18 G/DL — HIGH (ref 13–17)
HGB BLD-MCNC: 18 G/DL — HIGH (ref 13–17)
IMM GRANULOCYTES NFR BLD AUTO: 0.3 % — SIGNIFICANT CHANGE UP (ref 0–0.9)
IMM GRANULOCYTES NFR BLD AUTO: 0.3 % — SIGNIFICANT CHANGE UP (ref 0–0.9)
LYMPHOCYTES # BLD AUTO: 1.47 K/UL — SIGNIFICANT CHANGE UP (ref 1–3.3)
LYMPHOCYTES # BLD AUTO: 1.47 K/UL — SIGNIFICANT CHANGE UP (ref 1–3.3)
LYMPHOCYTES # BLD AUTO: 12.6 % — LOW (ref 13–44)
LYMPHOCYTES # BLD AUTO: 12.6 % — LOW (ref 13–44)
MAGNESIUM SERPL-MCNC: 2.4 MG/DL — SIGNIFICANT CHANGE UP (ref 1.6–2.6)
MAGNESIUM SERPL-MCNC: 2.4 MG/DL — SIGNIFICANT CHANGE UP (ref 1.6–2.6)
MCHC RBC-ENTMCNC: 32 PG — SIGNIFICANT CHANGE UP (ref 27–34)
MCHC RBC-ENTMCNC: 32 PG — SIGNIFICANT CHANGE UP (ref 27–34)
MCHC RBC-ENTMCNC: 34.2 GM/DL — SIGNIFICANT CHANGE UP (ref 32–36)
MCHC RBC-ENTMCNC: 34.2 GM/DL — SIGNIFICANT CHANGE UP (ref 32–36)
MCV RBC AUTO: 93.6 FL — SIGNIFICANT CHANGE UP (ref 80–100)
MCV RBC AUTO: 93.6 FL — SIGNIFICANT CHANGE UP (ref 80–100)
MONOCYTES # BLD AUTO: 0.78 K/UL — SIGNIFICANT CHANGE UP (ref 0–0.9)
MONOCYTES # BLD AUTO: 0.78 K/UL — SIGNIFICANT CHANGE UP (ref 0–0.9)
MONOCYTES NFR BLD AUTO: 6.7 % — SIGNIFICANT CHANGE UP (ref 2–14)
MONOCYTES NFR BLD AUTO: 6.7 % — SIGNIFICANT CHANGE UP (ref 2–14)
NEUTROPHILS # BLD AUTO: 9.19 K/UL — HIGH (ref 1.8–7.4)
NEUTROPHILS # BLD AUTO: 9.19 K/UL — HIGH (ref 1.8–7.4)
NEUTROPHILS NFR BLD AUTO: 78.9 % — HIGH (ref 43–77)
NEUTROPHILS NFR BLD AUTO: 78.9 % — HIGH (ref 43–77)
NRBC # BLD: 0 /100 WBCS — SIGNIFICANT CHANGE UP (ref 0–0)
NRBC # BLD: 0 /100 WBCS — SIGNIFICANT CHANGE UP (ref 0–0)
NT-PROBNP SERPL-SCNC: 4132 PG/ML — HIGH (ref 0–300)
NT-PROBNP SERPL-SCNC: 4132 PG/ML — HIGH (ref 0–300)
PLATELET # BLD AUTO: 260 K/UL — SIGNIFICANT CHANGE UP (ref 150–400)
PLATELET # BLD AUTO: 260 K/UL — SIGNIFICANT CHANGE UP (ref 150–400)
POTASSIUM SERPL-MCNC: 4.3 MMOL/L — SIGNIFICANT CHANGE UP (ref 3.5–5.3)
POTASSIUM SERPL-MCNC: 4.3 MMOL/L — SIGNIFICANT CHANGE UP (ref 3.5–5.3)
POTASSIUM SERPL-SCNC: 4.3 MMOL/L — SIGNIFICANT CHANGE UP (ref 3.5–5.3)
POTASSIUM SERPL-SCNC: 4.3 MMOL/L — SIGNIFICANT CHANGE UP (ref 3.5–5.3)
PROT SERPL-MCNC: 7.5 G/DL — SIGNIFICANT CHANGE UP (ref 6–8.3)
PROT SERPL-MCNC: 7.5 G/DL — SIGNIFICANT CHANGE UP (ref 6–8.3)
RBC # BLD: 5.62 M/UL — SIGNIFICANT CHANGE UP (ref 4.2–5.8)
RBC # BLD: 5.62 M/UL — SIGNIFICANT CHANGE UP (ref 4.2–5.8)
RBC # FLD: 13.2 % — SIGNIFICANT CHANGE UP (ref 10.3–14.5)
RBC # FLD: 13.2 % — SIGNIFICANT CHANGE UP (ref 10.3–14.5)
SODIUM SERPL-SCNC: 140 MMOL/L — SIGNIFICANT CHANGE UP (ref 135–145)
SODIUM SERPL-SCNC: 140 MMOL/L — SIGNIFICANT CHANGE UP (ref 135–145)
TROPONIN T, HIGH SENSITIVITY RESULT: 26 NG/L — SIGNIFICANT CHANGE UP (ref 0–51)
TROPONIN T, HIGH SENSITIVITY RESULT: 26 NG/L — SIGNIFICANT CHANGE UP (ref 0–51)
WBC # BLD: 11.66 K/UL — HIGH (ref 3.8–10.5)
WBC # BLD: 11.66 K/UL — HIGH (ref 3.8–10.5)
WBC # FLD AUTO: 11.66 K/UL — HIGH (ref 3.8–10.5)
WBC # FLD AUTO: 11.66 K/UL — HIGH (ref 3.8–10.5)

## 2023-11-24 PROCEDURE — 71045 X-RAY EXAM CHEST 1 VIEW: CPT | Mod: 26

## 2023-11-24 PROCEDURE — 99285 EMERGENCY DEPT VISIT HI MDM: CPT

## 2023-11-24 PROCEDURE — 93010 ELECTROCARDIOGRAM REPORT: CPT

## 2023-11-24 RX ORDER — NITROGLYCERIN 6.5 MG
0.4 CAPSULE, EXTENDED RELEASE ORAL ONCE
Refills: 0 | Status: COMPLETED | OUTPATIENT
Start: 2023-11-24 | End: 2023-11-24

## 2023-11-24 RX ORDER — ASPIRIN/CALCIUM CARB/MAGNESIUM 324 MG
162 TABLET ORAL ONCE
Refills: 0 | Status: COMPLETED | OUTPATIENT
Start: 2023-11-24 | End: 2023-11-24

## 2023-11-24 RX ADMIN — Medication 0.4 MILLIGRAM(S): at 21:54

## 2023-11-24 RX ADMIN — Medication 162 MILLIGRAM(S): at 21:50

## 2023-11-24 NOTE — ED PROVIDER NOTE - NS ED ROS FT
Review of Systems:  -General: no fever   -ENT: no congestion, no difficulty swallowing  -Pulmonary: no cough, no shortness of breath  -Cardiac: +chest pain, +palpitations  -Gastrointestinal: no abdominal pain, no nausea, no vomiting, and no diarrhea.  -Genitourinary: no blood or pain with urination  -Musculoskeletal: no back or neck pain  -Skin: no rashes  -Endocrine: +h/o diabetes   -Neurologic: No new weakness or numbness in extremities    All else negative unless otherwise specified elsewhere in this note.

## 2023-11-24 NOTE — ED PROVIDER NOTE - OBJECTIVE STATEMENT
Attending note (Enmanuel): 69y/o M with h/o CAD, CHF, Afib (on metoprolol and rivaroxaban), HLD, DM2, c/o left sided chest pain that began while driving around 5pm today; not short of breath. Has had similar CP like this typically associated with exertion; and resolving with sitting for a while; however, was not improving/resolving so came in, still present but much less.  States similar symptoms in May and needed a stent in a coronary artery (per patient). Took ASA 81mg x 2.     Card: Rivas and Baneras

## 2023-11-24 NOTE — ED PROVIDER NOTE - CCCP TRG CHIEF CMPLNT
Sedation Pre-Procedure Note    Patient Name: Martin Samano   YOB: 1941  Room/Bed: /0310-01  Medical Record Number: [id number]  Date: 10/7/2023   Time: 3:56 PM       Indication:  Left Pleural Effusion/Empyema    Consent: I have discussed with the patient and/or the patient representative the indication, alternatives, and the possible risks and/or complications of the planned procedure and the anesthesia methods. The patient and/or patient representative appear to understand and agree to proceed. Vital Signs:   Vitals:    10/07/23 1549   BP: (!) 124/56   Pulse: 79   Resp: 15   Temp:    SpO2: 100%       Past Medical History:   has a past medical history of Arthritis, Dental crown present, MVP (mitral valve prolapse), Primary hypertension, Primary hypertension, Prolonged emergence from general anesthesia, Reflux, and Wears glasses. Past Surgical History:   has a past surgical history that includes Dental surgery (9/2012); cyst removal (6/2012); Dental surgery (3/2003); Breast surgery (10/1995); Rush tooth extraction (6/1992); Thumb amputation (Left, 11/25/13); Colonoscopy; Tonsillectomy; Colonoscopy (7-23-15); Cataract removal with implant (Right, 09/05/2017); Cataract removal with implant (Right, 09/05/2017); other surgical history (09/25/2017); and Breast biopsy.     Medications:   Scheduled Meds:    [START ON 10/8/2023] furosemide  20 mg Oral Daily    sucralfate  1 g Oral 4 times per day    ampicillin-sulbactam  3,000 mg IntraVENous Q6H    famotidine  20 mg Oral Daily    pantoprazole  40 mg Oral Nightly    enoxaparin  40 mg SubCUTAneous Daily    vancomycin  125 mg Oral 4 times per day    saccharomyces boulardii  250 mg Oral BID    atenolol  50 mg Oral Daily    sodium chloride flush  5-40 mL IntraVENous 2 times per day     Continuous Infusions:    sodium chloride Stopped (10/04/23 0836)     PRN Meds: guaiFENesin-dextromethorphan, sodium chloride flush, sodium chloride, ondansetron **OR**
chest pain

## 2023-11-24 NOTE — ED PROVIDER NOTE - PHYSICAL EXAMINATION
On Physical Exam:  General: well appearing, in NAD, speaking clearly in full sentences and without difficulty; cooperative with exam  HEENT: anicteric sclera, airway patent  Neck: no JVD  Cardiac: irregular, s1s2  Lungs: CTABL  Abdomen: soft nontender/nondistended  : no bladder tenderness or distension  Skin: intact, no rash  Extremities: no peripheral edema, no gross deformities

## 2023-11-24 NOTE — ED ADULT NURSE NOTE - OBJECTIVE STATEMENT
67 yo male PMH HTN, HLD, DM, Stents x6(5/28/23) on ELiquis/Plavix, A&ox3, presents to ED c/o CP.  PT reports around 530pm driving home from work started feeling midsternal CP, radiating to left shoulder blade, 8/10 pain with no relief after taking ASA 324x2 PO.  PT reports it feels similar to CP when he first had his stents but "not as bad". Dneies SOB/n/v/dizziness. BReathing even and unlabored, abdomen soft nontender, no pedal edema. Pt denies palpitations, shortness of breath, headache, visual disturbances, numbness/tingling, fever, chills, diaphoresis,  nausea, vomiting, constipation, diarrhea, or urinary symptoms. 67 yo male PMH HTN, HLD, DM, Stents x6(5/28/23) on ELiquis/Plavix, A&ox3, presents to ED c/o CP.  PT reports around 530pm driving home from work started feeling midsternal CP, radiating to left shoulder blade, 8/10 pain with no relief after taking ASA 162x2 PO.  PT reports it feels similar to CP when he first had his stents but "not as bad". Dneies SOB/n/v/dizziness. BReathing even and unlabored, abdomen soft nontender, no pedal edema. Pt denies palpitations, shortness of breath, headache, visual disturbances, numbness/tingling, fever, chills, diaphoresis,  nausea, vomiting, constipation, diarrhea, or urinary symptoms.

## 2023-11-24 NOTE — ED ADULT NURSE NOTE - NSFALLHARMRISKINTERV_ED_ALL_ED

## 2023-11-24 NOTE — ED PROVIDER NOTE - PROGRESS NOTE DETAILS
Attending (James Whiteside D.O.):  Patient signed out to me, hemodynam stable, in afib, on xarelto, no reported missed doses. s/p asa. Chest pain mild at this time. delta trop increased. Private cards. Will inform admitting hospitalist. Will hold off on heparin given already on xarelto. Defer, switching to hospitalist.

## 2023-11-24 NOTE — ED ADULT TRIAGE NOTE - CHIEF COMPLAINT QUOTE
chest pain began a few hours ago midsternal non radiating, hx of cardiac stents patient states this feels the same

## 2023-11-24 NOTE — ED PROVIDER NOTE - ATTENDING CONTRIBUTION TO CARE
Attending note (Enmanuel): 69y/o M with h/o CAD, CHF, Afib (on metoprolol and rivaroxaban), HLD, DM2, c/o left sided chest pain that began while driving around 5pm today; not short of breath. Has had similar CP like this typically associated with exertion; and resolving with sitting for a while. Highly concerning for unstable angina; will obtain screening labs: cbc (to evaluate for leukocytosis or anemia) ,.cmp and troponin; send pt/inr, obtain CXR.  ECG shows AFib without signs of acute ischemia/infarct (known h/o afib).  Patient's description of chest pain, including lack of pleuritic nature, minimal risk factors and overall clinical picture are not consistent with a pulmonary embolism. The patient's clinical presentation, including type/description of pain, stable vitals and overall clinical picture are not consistent with an acute aortic dissection. no fever or other symptoms suggestive of an acute infectious etiology.  Please see above progress notes above for updates to medical decision making and the patient's clinical course. Anticipate need for admission for ongoing evaluation and management.

## 2023-11-25 DIAGNOSIS — I48.20 CHRONIC ATRIAL FIBRILLATION, UNSPECIFIED: ICD-10-CM

## 2023-11-25 DIAGNOSIS — I20.9 ANGINA PECTORIS, UNSPECIFIED: ICD-10-CM

## 2023-11-25 DIAGNOSIS — N17.9 ACUTE KIDNEY FAILURE, UNSPECIFIED: ICD-10-CM

## 2023-11-25 DIAGNOSIS — E11.9 TYPE 2 DIABETES MELLITUS WITHOUT COMPLICATIONS: ICD-10-CM

## 2023-11-25 DIAGNOSIS — R07.9 CHEST PAIN, UNSPECIFIED: ICD-10-CM

## 2023-11-25 DIAGNOSIS — I25.10 ATHEROSCLEROTIC HEART DISEASE OF NATIVE CORONARY ARTERY WITHOUT ANGINA PECTORIS: ICD-10-CM

## 2023-11-25 DIAGNOSIS — I50.22 CHRONIC SYSTOLIC (CONGESTIVE) HEART FAILURE: ICD-10-CM

## 2023-11-25 LAB
A1C WITH ESTIMATED AVERAGE GLUCOSE RESULT: 6.6 % — HIGH (ref 4–5.6)
A1C WITH ESTIMATED AVERAGE GLUCOSE RESULT: 6.6 % — HIGH (ref 4–5.6)
ANION GAP SERPL CALC-SCNC: 10 MMOL/L — SIGNIFICANT CHANGE UP (ref 5–17)
ANION GAP SERPL CALC-SCNC: 10 MMOL/L — SIGNIFICANT CHANGE UP (ref 5–17)
BUN SERPL-MCNC: 23 MG/DL — SIGNIFICANT CHANGE UP (ref 7–23)
BUN SERPL-MCNC: 23 MG/DL — SIGNIFICANT CHANGE UP (ref 7–23)
CALCIUM SERPL-MCNC: 9.3 MG/DL — SIGNIFICANT CHANGE UP (ref 8.4–10.5)
CALCIUM SERPL-MCNC: 9.3 MG/DL — SIGNIFICANT CHANGE UP (ref 8.4–10.5)
CHLORIDE SERPL-SCNC: 109 MMOL/L — HIGH (ref 96–108)
CHLORIDE SERPL-SCNC: 109 MMOL/L — HIGH (ref 96–108)
CHOLEST SERPL-MCNC: 101 MG/DL — SIGNIFICANT CHANGE UP
CHOLEST SERPL-MCNC: 101 MG/DL — SIGNIFICANT CHANGE UP
CO2 SERPL-SCNC: 21 MMOL/L — LOW (ref 22–31)
CO2 SERPL-SCNC: 21 MMOL/L — LOW (ref 22–31)
CREAT SERPL-MCNC: 1.52 MG/DL — HIGH (ref 0.5–1.3)
CREAT SERPL-MCNC: 1.52 MG/DL — HIGH (ref 0.5–1.3)
EGFR: 50 ML/MIN/1.73M2 — LOW
EGFR: 50 ML/MIN/1.73M2 — LOW
ESTIMATED AVERAGE GLUCOSE: 143 MG/DL — HIGH (ref 68–114)
ESTIMATED AVERAGE GLUCOSE: 143 MG/DL — HIGH (ref 68–114)
GLUCOSE BLDC GLUCOMTR-MCNC: 114 MG/DL — HIGH (ref 70–99)
GLUCOSE BLDC GLUCOMTR-MCNC: 114 MG/DL — HIGH (ref 70–99)
GLUCOSE BLDC GLUCOMTR-MCNC: 119 MG/DL — HIGH (ref 70–99)
GLUCOSE BLDC GLUCOMTR-MCNC: 119 MG/DL — HIGH (ref 70–99)
GLUCOSE BLDC GLUCOMTR-MCNC: 143 MG/DL — HIGH (ref 70–99)
GLUCOSE BLDC GLUCOMTR-MCNC: 143 MG/DL — HIGH (ref 70–99)
GLUCOSE BLDC GLUCOMTR-MCNC: 155 MG/DL — HIGH (ref 70–99)
GLUCOSE BLDC GLUCOMTR-MCNC: 155 MG/DL — HIGH (ref 70–99)
GLUCOSE SERPL-MCNC: 114 MG/DL — HIGH (ref 70–99)
GLUCOSE SERPL-MCNC: 114 MG/DL — HIGH (ref 70–99)
HCT VFR BLD CALC: 43.8 % — SIGNIFICANT CHANGE UP (ref 39–50)
HCT VFR BLD CALC: 43.8 % — SIGNIFICANT CHANGE UP (ref 39–50)
HDLC SERPL-MCNC: 44 MG/DL — SIGNIFICANT CHANGE UP
HDLC SERPL-MCNC: 44 MG/DL — SIGNIFICANT CHANGE UP
HGB BLD-MCNC: 15 G/DL — SIGNIFICANT CHANGE UP (ref 13–17)
HGB BLD-MCNC: 15 G/DL — SIGNIFICANT CHANGE UP (ref 13–17)
LIPID PNL WITH DIRECT LDL SERPL: 44 MG/DL — SIGNIFICANT CHANGE UP
LIPID PNL WITH DIRECT LDL SERPL: 44 MG/DL — SIGNIFICANT CHANGE UP
MAGNESIUM SERPL-MCNC: 2.3 MG/DL — SIGNIFICANT CHANGE UP (ref 1.6–2.6)
MAGNESIUM SERPL-MCNC: 2.3 MG/DL — SIGNIFICANT CHANGE UP (ref 1.6–2.6)
MCHC RBC-ENTMCNC: 31.3 PG — SIGNIFICANT CHANGE UP (ref 27–34)
MCHC RBC-ENTMCNC: 31.3 PG — SIGNIFICANT CHANGE UP (ref 27–34)
MCHC RBC-ENTMCNC: 34.2 GM/DL — SIGNIFICANT CHANGE UP (ref 32–36)
MCHC RBC-ENTMCNC: 34.2 GM/DL — SIGNIFICANT CHANGE UP (ref 32–36)
MCV RBC AUTO: 91.4 FL — SIGNIFICANT CHANGE UP (ref 80–100)
MCV RBC AUTO: 91.4 FL — SIGNIFICANT CHANGE UP (ref 80–100)
MRSA PCR RESULT.: SIGNIFICANT CHANGE UP
MRSA PCR RESULT.: SIGNIFICANT CHANGE UP
NON HDL CHOLESTEROL: 57 MG/DL — SIGNIFICANT CHANGE UP
NON HDL CHOLESTEROL: 57 MG/DL — SIGNIFICANT CHANGE UP
NRBC # BLD: 0 /100 WBCS — SIGNIFICANT CHANGE UP (ref 0–0)
NRBC # BLD: 0 /100 WBCS — SIGNIFICANT CHANGE UP (ref 0–0)
PLATELET # BLD AUTO: 175 K/UL — SIGNIFICANT CHANGE UP (ref 150–400)
PLATELET # BLD AUTO: 175 K/UL — SIGNIFICANT CHANGE UP (ref 150–400)
POTASSIUM SERPL-MCNC: 4.5 MMOL/L — SIGNIFICANT CHANGE UP (ref 3.5–5.3)
POTASSIUM SERPL-MCNC: 4.5 MMOL/L — SIGNIFICANT CHANGE UP (ref 3.5–5.3)
POTASSIUM SERPL-SCNC: 4.5 MMOL/L — SIGNIFICANT CHANGE UP (ref 3.5–5.3)
POTASSIUM SERPL-SCNC: 4.5 MMOL/L — SIGNIFICANT CHANGE UP (ref 3.5–5.3)
RBC # BLD: 4.79 M/UL — SIGNIFICANT CHANGE UP (ref 4.2–5.8)
RBC # BLD: 4.79 M/UL — SIGNIFICANT CHANGE UP (ref 4.2–5.8)
RBC # FLD: 13.2 % — SIGNIFICANT CHANGE UP (ref 10.3–14.5)
RBC # FLD: 13.2 % — SIGNIFICANT CHANGE UP (ref 10.3–14.5)
S AUREUS DNA NOSE QL NAA+PROBE: SIGNIFICANT CHANGE UP
S AUREUS DNA NOSE QL NAA+PROBE: SIGNIFICANT CHANGE UP
SODIUM SERPL-SCNC: 140 MMOL/L — SIGNIFICANT CHANGE UP (ref 135–145)
SODIUM SERPL-SCNC: 140 MMOL/L — SIGNIFICANT CHANGE UP (ref 135–145)
TRIGL SERPL-MCNC: 57 MG/DL — SIGNIFICANT CHANGE UP
TRIGL SERPL-MCNC: 57 MG/DL — SIGNIFICANT CHANGE UP
TROPONIN T, HIGH SENSITIVITY RESULT: 160 NG/L — HIGH (ref 0–51)
TROPONIN T, HIGH SENSITIVITY RESULT: 160 NG/L — HIGH (ref 0–51)
TROPONIN T, HIGH SENSITIVITY RESULT: 282 NG/L — HIGH (ref 0–51)
TROPONIN T, HIGH SENSITIVITY RESULT: 282 NG/L — HIGH (ref 0–51)
WBC # BLD: 10.14 K/UL — SIGNIFICANT CHANGE UP (ref 3.8–10.5)
WBC # BLD: 10.14 K/UL — SIGNIFICANT CHANGE UP (ref 3.8–10.5)
WBC # FLD AUTO: 10.14 K/UL — SIGNIFICANT CHANGE UP (ref 3.8–10.5)
WBC # FLD AUTO: 10.14 K/UL — SIGNIFICANT CHANGE UP (ref 3.8–10.5)

## 2023-11-25 PROCEDURE — 99223 1ST HOSP IP/OBS HIGH 75: CPT

## 2023-11-25 RX ORDER — DEXTROSE 50 % IN WATER 50 %
15 SYRINGE (ML) INTRAVENOUS ONCE
Refills: 0 | Status: DISCONTINUED | OUTPATIENT
Start: 2023-11-25 | End: 2023-11-29

## 2023-11-25 RX ORDER — HEPARIN SODIUM 5000 [USP'U]/ML
5000 INJECTION INTRAVENOUS; SUBCUTANEOUS EVERY 6 HOURS
Refills: 0 | Status: DISCONTINUED | OUTPATIENT
Start: 2023-11-25 | End: 2023-11-27

## 2023-11-25 RX ORDER — LANOLIN ALCOHOL/MO/W.PET/CERES
3 CREAM (GRAM) TOPICAL AT BEDTIME
Refills: 0 | Status: DISCONTINUED | OUTPATIENT
Start: 2023-11-25 | End: 2023-11-29

## 2023-11-25 RX ORDER — ACETAMINOPHEN 500 MG
650 TABLET ORAL EVERY 6 HOURS
Refills: 0 | Status: DISCONTINUED | OUTPATIENT
Start: 2023-11-25 | End: 2023-11-29

## 2023-11-25 RX ORDER — FUROSEMIDE 40 MG
40 TABLET ORAL DAILY
Refills: 0 | Status: DISCONTINUED | OUTPATIENT
Start: 2023-11-25 | End: 2023-11-26

## 2023-11-25 RX ORDER — DEXTROSE 50 % IN WATER 50 %
12.5 SYRINGE (ML) INTRAVENOUS ONCE
Refills: 0 | Status: DISCONTINUED | OUTPATIENT
Start: 2023-11-25 | End: 2023-11-29

## 2023-11-25 RX ORDER — ONDANSETRON 8 MG/1
4 TABLET, FILM COATED ORAL EVERY 8 HOURS
Refills: 0 | Status: DISCONTINUED | OUTPATIENT
Start: 2023-11-25 | End: 2023-11-29

## 2023-11-25 RX ORDER — SODIUM CHLORIDE 9 MG/ML
1000 INJECTION, SOLUTION INTRAVENOUS
Refills: 0 | Status: DISCONTINUED | OUTPATIENT
Start: 2023-11-25 | End: 2023-11-29

## 2023-11-25 RX ORDER — ATORVASTATIN CALCIUM 80 MG/1
80 TABLET, FILM COATED ORAL AT BEDTIME
Refills: 0 | Status: DISCONTINUED | OUTPATIENT
Start: 2023-11-25 | End: 2023-11-29

## 2023-11-25 RX ORDER — HEPARIN SODIUM 5000 [USP'U]/ML
2500 INJECTION INTRAVENOUS; SUBCUTANEOUS EVERY 6 HOURS
Refills: 0 | Status: DISCONTINUED | OUTPATIENT
Start: 2023-11-25 | End: 2023-11-27

## 2023-11-25 RX ORDER — INSULIN GLARGINE 100 [IU]/ML
20 INJECTION, SOLUTION SUBCUTANEOUS AT BEDTIME
Refills: 0 | Status: DISCONTINUED | OUTPATIENT
Start: 2023-11-25 | End: 2023-11-29

## 2023-11-25 RX ORDER — CLOPIDOGREL BISULFATE 75 MG/1
75 TABLET, FILM COATED ORAL DAILY
Refills: 0 | Status: DISCONTINUED | OUTPATIENT
Start: 2023-11-25 | End: 2023-11-29

## 2023-11-25 RX ORDER — GLUCAGON INJECTION, SOLUTION 0.5 MG/.1ML
1 INJECTION, SOLUTION SUBCUTANEOUS ONCE
Refills: 0 | Status: DISCONTINUED | OUTPATIENT
Start: 2023-11-25 | End: 2023-11-29

## 2023-11-25 RX ORDER — DEXTROSE 50 % IN WATER 50 %
25 SYRINGE (ML) INTRAVENOUS ONCE
Refills: 0 | Status: DISCONTINUED | OUTPATIENT
Start: 2023-11-25 | End: 2023-11-29

## 2023-11-25 RX ORDER — INSULIN LISPRO 100/ML
VIAL (ML) SUBCUTANEOUS
Refills: 0 | Status: DISCONTINUED | OUTPATIENT
Start: 2023-11-25 | End: 2023-11-29

## 2023-11-25 RX ORDER — SACUBITRIL AND VALSARTAN 24; 26 MG/1; MG/1
1 TABLET, FILM COATED ORAL
Refills: 0 | Status: DISCONTINUED | OUTPATIENT
Start: 2023-11-25 | End: 2023-11-29

## 2023-11-25 RX ORDER — SODIUM CHLORIDE 9 MG/ML
500 INJECTION INTRAMUSCULAR; INTRAVENOUS; SUBCUTANEOUS
Refills: 0 | Status: DISCONTINUED | OUTPATIENT
Start: 2023-11-25 | End: 2023-11-29

## 2023-11-25 RX ORDER — HEPARIN SODIUM 5000 [USP'U]/ML
5000 INJECTION INTRAVENOUS; SUBCUTANEOUS ONCE
Refills: 0 | Status: COMPLETED | OUTPATIENT
Start: 2023-11-25 | End: 2023-11-25

## 2023-11-25 RX ORDER — INSULIN LISPRO 100/ML
5 VIAL (ML) SUBCUTANEOUS
Refills: 0 | Status: DISCONTINUED | OUTPATIENT
Start: 2023-11-25 | End: 2023-11-29

## 2023-11-25 RX ORDER — CHLORHEXIDINE GLUCONATE 213 G/1000ML
1 SOLUTION TOPICAL DAILY
Refills: 0 | Status: DISCONTINUED | OUTPATIENT
Start: 2023-11-25 | End: 2023-11-29

## 2023-11-25 RX ORDER — METOPROLOL TARTRATE 50 MG
50 TABLET ORAL
Refills: 0 | Status: DISCONTINUED | OUTPATIENT
Start: 2023-11-25 | End: 2023-11-29

## 2023-11-25 RX ORDER — RIVAROXABAN 15 MG-20MG
15 KIT ORAL
Refills: 0 | Status: DISCONTINUED | OUTPATIENT
Start: 2023-11-25 | End: 2023-11-25

## 2023-11-25 RX ORDER — INSULIN LISPRO 100/ML
VIAL (ML) SUBCUTANEOUS AT BEDTIME
Refills: 0 | Status: DISCONTINUED | OUTPATIENT
Start: 2023-11-25 | End: 2023-11-29

## 2023-11-25 RX ORDER — HEPARIN SODIUM 5000 [USP'U]/ML
INJECTION INTRAVENOUS; SUBCUTANEOUS
Qty: 25000 | Refills: 0 | Status: DISCONTINUED | OUTPATIENT
Start: 2023-11-25 | End: 2023-11-27

## 2023-11-25 RX ADMIN — HEPARIN SODIUM 1100 UNIT(S)/HR: 5000 INJECTION INTRAVENOUS; SUBCUTANEOUS at 21:17

## 2023-11-25 RX ADMIN — HEPARIN SODIUM 5000 UNIT(S): 5000 INJECTION INTRAVENOUS; SUBCUTANEOUS at 21:20

## 2023-11-25 RX ADMIN — CLOPIDOGREL BISULFATE 75 MILLIGRAM(S): 75 TABLET, FILM COATED ORAL at 11:21

## 2023-11-25 RX ADMIN — CHLORHEXIDINE GLUCONATE 1 APPLICATION(S): 213 SOLUTION TOPICAL at 11:21

## 2023-11-25 RX ADMIN — SODIUM CHLORIDE 50 MILLILITER(S): 9 INJECTION INTRAMUSCULAR; INTRAVENOUS; SUBCUTANEOUS at 02:54

## 2023-11-25 RX ADMIN — Medication 40 MILLIGRAM(S): at 06:27

## 2023-11-25 RX ADMIN — ATORVASTATIN CALCIUM 80 MILLIGRAM(S): 80 TABLET, FILM COATED ORAL at 21:35

## 2023-11-25 RX ADMIN — SACUBITRIL AND VALSARTAN 1 TABLET(S): 24; 26 TABLET, FILM COATED ORAL at 06:38

## 2023-11-25 RX ADMIN — INSULIN GLARGINE 20 UNIT(S): 100 INJECTION, SOLUTION SUBCUTANEOUS at 21:35

## 2023-11-25 RX ADMIN — RIVAROXABAN 15 MILLIGRAM(S): KIT at 17:35

## 2023-11-25 RX ADMIN — Medication 5 UNIT(S): at 17:35

## 2023-11-25 RX ADMIN — SACUBITRIL AND VALSARTAN 1 TABLET(S): 24; 26 TABLET, FILM COATED ORAL at 17:35

## 2023-11-25 RX ADMIN — Medication 50 MILLIGRAM(S): at 17:35

## 2023-11-25 RX ADMIN — Medication 50 MILLIGRAM(S): at 06:31

## 2023-11-25 NOTE — H&P ADULT - HISTORY OF PRESENT ILLNESS
68 M pmh pafib, CAD multiple PCIs w/ recent TITO to ostial LM ( 6/2/23), CKD3, IDDM2, CHF,  p/w left CP that began while driving around 5pm today, no SOB. Hx of similar sx in the past usually associated w/exertion and improves w/ rest.  This current episode was not improving w/rest so pt present to ED for further eval. He took ASA 81mg x2 PTA. Endorsing chest discomfort though milder than before.      ED: ASA 162mg, SL nitro     ROS: Denies SOB, palpitation, N/V/D, fever, cough, chills, dizziness, abm pain, recent travel, sick contact, change in bowel and urinary habits     A 10-system ROS was performed and is negative except as noted above and/or in the HPI.

## 2023-11-25 NOTE — H&P ADULT - PROBLEM SELECTOR PLAN 5
- Lantus 20u qhs   - Lispro 5u TIDAC  - ISS ACHS  - DASH/CC diet  - Check A1c - Lantus 20u qhs   - Lispro 5u TIDAC  - ISS ACHS  - NPO for procedure >>> DASH/CC diet  - Check A1c

## 2023-11-25 NOTE — PROGRESS NOTE ADULT - SUBJECTIVE AND OBJECTIVE BOX
Problem: PHYSICAL THERAPY ADULT  Goal: Performs mobility at highest level of function for planned discharge setting  See evaluation for individualized goals  Treatment/Interventions: Functional transfer training, LE strengthening/ROM, Elevations, Therapeutic exercise, Endurance training, Equipment eval/education, Bed mobility, Gait training, Spoke to MD, Spoke to nursing, Spoke to case management (Dr Heena Duvall (CM) and NSG)  Equipment Recommended: Shira Guillory       See flowsheet documentation for full assessment, interventions and recommendations  Outcome: Progressing  Prognosis: Good  Problem List: Decreased strength, Decreased endurance, Decreased skin integrity, Obesity, Pain  Assessment: pt able to ambulate 120 feet x2 with use of RW on various surfaces S level of A  Pt able to perfrom transfers and BM S level of A  Pt able to go up and down 14 steps with use of B rail nonreciprical gait pattern S level of A  Pt able to perform and complete BLE ther ex HEP sitting in chair postmobility AROM  Pt is able to return home with A from fiancee and family, needs RW and home PT  Pt would cont to benefit from skilled inpt PT services to maximize functional independence  Barriers to Discharge: Inaccessible home environment     Recommendation: Home with family support, Home PT (needs RW)          See flowsheet documentation for full assessment  chest pain free   will hold xarelto   plan for cath

## 2023-11-25 NOTE — H&P ADULT - PROBLEM SELECTOR PLAN 4
- Cr  1.71, (baseline ~ 1.4 - 1.6)  - Judicious IVF given CHF  - Trend labs, monitor renal function   - Avoid nephrotoxic meds - Cr  1.71, (baseline ~ 1.4 - 1.6), possible cardio-renal component   - Judicious IVF given CHF hx  - Trend labs, monitor renal function   - Avoid nephrotoxic meds

## 2023-11-25 NOTE — H&P ADULT - PROBLEM SELECTOR PLAN 1
P/w left CP similar to prior episode where he required stent   - CP much improved, hemodynamically stable  - S/P ASA 162mg in ED   - EKG: Afib  - Trop: 26 > 160 --Trend till peak   - Tele monitoring  - c/w aspirin 81mg daily  - c/w Clopidogrel  75mg qd   - Is on Xarelto (home med), will switch to heparin gtt   - c/w atorvastatin 80mg daily  - check A1c, lipids  - TTE    - Cardio consult to be called in AM   - NPO at midnight for likely cath   - if pt with new or worsening chest pain, stat trop/ekg, call cardiology P/w left CP similar to prior episode where he required stent   - CP much improved, hemodynamically stable  - S/P ASA 162mg in ED   - EKG: Afib  - Trop: 26 > 160 --Trend till peak   - Tele monitoring  - c/w aspirin 81mg daily  - c/w Clopidogrel  75mg qd   - C/w Xarelto (home med) for AC    - c/w atorvastatin 80mg daily  - check A1c, lipids  - TTE    - Cardio consult to be called in AM   - NPO at midnight for likely cath   - if pt with new or worsening chest pain, stat trop/ekg, call cardiology P/w left CP similar to prior episode where he required stent   - CP now resolved, hemodynamically stable  - S/P ASA 162mg in ED   - EKG: Afib  - Trop: 26 > 160 --Trend till peak   - Tele monitoring  - c/w Clopidogrel  75mg qd   - C/w Xarelto (home med) for AC    - c/w atorvastatin 80mg daily  - check A1c, lipids  - TTE  (ordered)  - Cardio consult to be called in AM   - NPO at midnight for possible cath   - if pt with new or worsening chest pain, stat trop/ekg, call cardiology

## 2023-11-25 NOTE — CONSULT NOTE ADULT - SUBJECTIVE AND OBJECTIVE BOX
CHIEF COMPLAINT: Chest Pain    HPI:  68 M PMH PAF, CAD multiple PCIs w/ recent TITO to ostial LM ( 23), CKD3, IDDM2, CHF,  p/w left CP that began while driving around 5pm yesterday , no SOB. Hx of similar sx in the past usually associated w/exertion and improves w/ rest.  This current episode was not improving w/rest so pt present to ED for further eval. He took ASA 81mg x2 PTA. Endorsing chest discomfort though milder than before. After he received NTG the pain was gone NO pain today.       PAST MEDICAL & SURGICAL HISTORY:  Former smoker      CAD in native artery      Type 2 diabetes mellitus      Hyperlipidemia, unspecified hyperlipidemia type      Essential hypertension, hypertension with unspecified goal      Afib      Hematoma of leg      Stented coronary artery      CHF (congestive heart failure)      s/p Carotid Endarterectomy  left          Allergies    No Known Allergies    Intolerances        SOCIAL HISTORY    Smoking Hx:  ETOH Hx:  Marital Status:  Occupational Hx:    FAMILY HISTORY:  Family history of heart disease  father  age 71    FH: atrial fibrillation  sister        MEDICATIONS:  acetaminophen     Tablet .. 650 milliGRAM(s) Oral every 6 hours PRN  aluminum hydroxide/magnesium hydroxide/simethicone Suspension 30 milliLiter(s) Oral every 4 hours PRN  atorvastatin 80 milliGRAM(s) Oral at bedtime  chlorhexidine 2% Cloths 1 Application(s) Topical daily  clopidogrel Tablet 75 milliGRAM(s) Oral daily  dextrose 5%. 1000 milliLiter(s) IV Continuous <Continuous>  dextrose 5%. 1000 milliLiter(s) IV Continuous <Continuous>  dextrose 50% Injectable 25 Gram(s) IV Push once  dextrose 50% Injectable 25 Gram(s) IV Push once  dextrose 50% Injectable 12.5 Gram(s) IV Push once  dextrose Oral Gel 15 Gram(s) Oral once PRN  furosemide    Tablet 40 milliGRAM(s) Oral daily  glucagon  Injectable 1 milliGRAM(s) IntraMuscular once  insulin glargine Injectable (LANTUS) 20 Unit(s) SubCutaneous at bedtime  insulin lispro (ADMELOG) corrective regimen sliding scale   SubCutaneous three times a day before meals  insulin lispro (ADMELOG) corrective regimen sliding scale   SubCutaneous at bedtime  insulin lispro Injectable (ADMELOG) 5 Unit(s) SubCutaneous three times a day before meals  melatonin 3 milliGRAM(s) Oral at bedtime PRN  metoprolol tartrate 50 milliGRAM(s) Oral two times a day  ondansetron Injectable 4 milliGRAM(s) IV Push every 8 hours PRN  rivaroxaban 15 milliGRAM(s) Oral with dinner  sacubitril 49 mG/valsartan 51 mG 1 Tablet(s) Oral two times a day  sodium chloride 0.9%. 500 milliLiter(s) IV Continuous <Continuous>      REVIEW OF SYSTEMS:    CONSTITUTIONAL: No weakness, fevers or chills  EYES/ENT: No visual changes;  No vertigo or throat pain   NECK: No pain or stiffness  RESPIRATORY: No cough, wheezing, hemoptysis; No shortness of breath  CARDIOVASCULAR: No chest pain or palpitations  GASTROINTESTINAL: No abdominal or epigastric pain. No nausea, vomiting, or hematemesis; No diarrhea or constipation. No melena or hematochezia.  GENITOURINARY: No dysuria, frequency or hematuria  NEUROLOGICAL: No numbness or weakness  SKIN: No itching, burning, rashes, or lesions   All other review of systems is negative unless indicated above    Vital Signs Last 24 Hrs  T(C): 36.6 (2023 11:17), Max: 36.7 (2023 01:08)  T(F): 97.8 (2023 11:17), Max: 98.1 (2023 06:26)  HR: 67 (2023 11:17) (67 - 120)  BP: 127/82 (2023 11:17) (127/82 - 187/104)  BP(mean): --  RR: 18 (2023 11:17) (18 - 22)  SpO2: 100% (2023 11:17) (97% - 100%)    Parameters below as of 2023 11:17  Patient On (Oxygen Delivery Method): room air        I&O's Summary    2023 07:  -  2023 07:00  --------------------------------------------------------  IN: 270 mL / OUT: 0 mL / NET: 270 mL    2023 07:01  -  2023 15:06  --------------------------------------------------------  IN: 0 mL / OUT: 1000 mL / NET: -1000 mL        PHYSICAL EXAM:    Constitutional: NAD, awake and alert, well-developed  HEENT: PERR, EOMI  Neck: soft and supple, No LAD, No JVD  Respiratory: Breath sounds are clear bilaterally, No wheezing, rales or rhonchi  Cardiovascular: Regular rate and rhythm, normal S1 and S2,  no murmurs, gallops or rubs  Gastrointestinal: Bowel Sounds present, soft, nontender.   Extremities: No peripheral edema. No clubbing or cyanosis.  Vascular: 2+ peripheral pulses  Neurological: A/O x 3, no focal deficits  Musculoskeletal: no calf tenderness.  Skin: No rashes.      LABS: All Labs Reviewed:                        15.0   10.14 )-----------( 175      ( 2023 05:27 )             43.8                         18.0   11.66 )-----------( 260      ( 2023 20:09 )             52.6     2023 05:27    140    |  109    |  23     ----------------------------<  114    4.5     |  21     |  1.52   2023 20:09    140    |  102    |  24     ----------------------------<  236    4.3     |  23     |  1.71     Ca    9.3        2023 05:27  Ca    10.0       2023 20:09  Mg     2.3       2023 05:27  Mg     2.4       2023 20:09    TPro  7.5    /  Alb  4.8    /  TBili  1.3    /  DBili  x      /  AST  26     /  ALT  21     /  AlkPhos  135    2023 20:09      Blood Culture:     CARDIAC MARKERS:            RADIOLOGY/EKG:

## 2023-11-25 NOTE — H&P ADULT - NSHPPHYSICALEXAM_GEN_ALL_CORE
T(C): 36.6 (11-25-23 @ 01:20), Max: 36.7 (11-25-23 @ 01:08)  HR: 94 (11-25-23 @ 01:20) (94 - 120)  BP: 139/84 (11-25-23 @ 01:20) (139/84 - 187/104)  RR: 18 (11-25-23 @ 01:20) (18 - 22)  SpO2: 99% (11-25-23 @ 01:20) (99% - 100%)    CONSTITUTIONAL: Well groomed, no apparent distress  EYES: PERRLA and symmetric, EOMI  ENMT: MMM. Normal dentition  RESP: No respiratory distress, CTA b/l, no WRR  CV: +S1S2, irregular, MRG; no peripheral edema  GI: Soft, NTND, no RGR  MSK: Normal gait; normal pain free ROM x4 extremities   SKIN: No rashes or ulcers noted  NEURO: CN II-XII grossly intact; normal reflexes, sensation intact throughout   PSYCH: A+O x 3, mood and affect appropriate T(C): 36.6 (11-25-23 @ 01:20), Max: 36.7 (11-25-23 @ 01:08)  HR: 94 (11-25-23 @ 01:20) (94 - 120)  BP: 139/84 (11-25-23 @ 01:20) (139/84 - 187/104)  RR: 18 (11-25-23 @ 01:20) (18 - 22)  SpO2: 99% (11-25-23 @ 01:20) (99% - 100%)    CONSTITUTIONAL: Well groomed, no apparent distress  EYES: PERRLA and symmetric, EOMI  ENMT: MMM. Normal dentition  RESP: No respiratory distress, CTA b/l, no WRR  CV: +S1S2, irregular, no MRG; no peripheral edema  GI: Soft, NTND, no RGR  MSK: Normal gait; normal pain free ROM x4 extremities   SKIN: No rashes or ulcers noted  NEURO: CN II-XII grossly intact; normal reflexes, sensation intact throughout   PSYCH: A+O x 3, mood and affect appropriate

## 2023-11-25 NOTE — PHYSICAL THERAPY INITIAL EVALUATION ADULT - PERTINENT HX OF CURRENT PROBLEM, REHAB EVAL
Pt is 68M admitted 11/25/23 pmhx pafib, CAD multiple PCIs w/ recent TITO to ostial LM ( 6/2/23), CKD3, IDDM2, CHF,  p/w left CP that began while driving around 5pm today, no SOB. Hx of similar sx in the past usually associated w/exertion and improves w/ rest.  This current episode was not improving w/rest so pt present to ED for further eval. He took ASA 81mg x2 PTA. Endorsing chest discomfort though milder than before.       XRAY CHEST: Clear lungs. Pt is 68M admitted 11/25/23 PMH AFIB, CAD multiple PCIs w/ recent TITO to ostial LM ( 6/2/23), CKD3, IDDM2, CHF,  p/w left CP that began while driving around 5pm today, no SOB. Hx of similar sx in the past usually associated w/exertion and improves w/ rest.  This current episode was not improving w/rest so pt present to ED for further eval. He took ASA 81mg x2 PTA. Endorsing chest discomfort though milder than before. Hospital course: XRAY CHEST: Clear lungs.

## 2023-11-25 NOTE — H&P ADULT - PROBLEM SELECTOR PLAN 2
- EKG: Afib     CXR clear   - c/w Metoprolol  - C/w AC  - C/w ASA   - Telemetry - EKG: Afib       - CXR clear   - c/w Metoprolol  - C/w AC  - C/w plavix   - Telemetry

## 2023-11-25 NOTE — PHYSICAL THERAPY INITIAL EVALUATION ADULT - ADDITIONAL COMMENTS
Patient lives with his wife in a private house with 7STE w/B/L handrails and 12 stairs w/B/L inside to reach bedroom. Patient reports, PTA, he was independent in mobility & ADLs w/o use of AD; denies any hx of fall.

## 2023-11-25 NOTE — H&P ADULT - NSHPLABSRESULTS_GEN_ALL_CORE
18.0   11.66 )-----------( 260      ( 24 Nov 2023 20:09 )             52.6     11-24    140  |  102  |  24<H>  ----------------------------<  236<H>  4.3   |  23  |  1.71<H>    Ca    10.0      24 Nov 2023 20:09  Mg     2.4     11-24    TPro  7.5  /  Alb  4.8  /  TBili  1.3<H>  /  DBili  x   /  AST  26  /  ALT  21  /  AlkPhos  135<H>  11-24      Troponin T, High Sensitivity Result: 26: (11.24.23 @ 20:09)    Troponin T, High Sensitivity Result: 160 (11.24.23 @ 23:33)    Pro-Brain Natriuretic Peptide: 4132 pg/mL (11.24.23 @ 20:09)      - - - - - - - - - - - - - - - - - - - - - - - - - - - - - - - - - - - - - - - - - - - - - - - - - - - -       EKG personally reviewed:   Afib 102bpm      < from: TTE W or WO Ultrasound Enhancing Agent (05.30.23 @ 10:53) >     1. No evidence of a thrombus in the left ventricle.  LV systolic function is mildly decreased with a calculated ejection fraction of 44 %   2. Basal and mid inferior wall, basal and mid inferolateral wall, and basal inferoseptal segment are abnormal.   3. Compared to the transthoracic echocardiogram performed on 5/5/2023 There is new inferior and inferolateral hypokinesis..       Images personally reviewed:     < from: Xray Chest 1 View- PORTABLE-Urgent (11.24.23 @ 21:11) >  IMPRESSION: Clear lungs.

## 2023-11-25 NOTE — CONSULT NOTE ADULT - ASSESSMENT
68 M PMH PAF, CAD multiple PCIs w/ recent TITO to ostial LM ( 6/2/23), CKD3, IDDM2, CHF,  p/w left CP that began while driving around 5pm yesterday  First Trop - 26 at 20:09. Second Trop 160 Third 282  LDL 44 Pain free at present   - Cont Plavix and Xarelto  - if pain recurs stop Xarelto and Heparinize   - if pain recurs will need urgent cath eval  - Cont BB/ARNI  - check Echo  - cont Tele  - will need Cardiac CATH - tomorrow or Monday. R/B/A d/w patient. HE agrees to proceed.

## 2023-11-25 NOTE — H&P ADULT - PROBLEM SELECTOR PLAN 6
- Clinically euvolemic   - EKG:  Afib   - Trop:  26 > 160    BNP:  4132  - CXR: clear     - I&O, daily wt   - Telemetry   - check echo (ordered)  - cardio consult to be called in AM   - C/w home meds - Clinically euvolemic   - EKG: Afib   - Trop:  26 > 160     BNP: 4132  - CXR: clear     - I&O, daily wt   - Telemetry   - C/w home meds

## 2023-11-25 NOTE — H&P ADULT - ASSESSMENT
68 M pmh pafib, CAD multiple PCIs w/ recent TITO to ostial LM ( 6/2/23), CKD3, IDDM2, CHF,  p/w CP being adm for NSTEMI

## 2023-11-26 LAB
ANION GAP SERPL CALC-SCNC: 15 MMOL/L — SIGNIFICANT CHANGE UP (ref 5–17)
ANION GAP SERPL CALC-SCNC: 15 MMOL/L — SIGNIFICANT CHANGE UP (ref 5–17)
APTT BLD: 189.5 SEC — CRITICAL HIGH (ref 24.5–35.6)
APTT BLD: 189.5 SEC — CRITICAL HIGH (ref 24.5–35.6)
APTT BLD: 78.3 SEC — HIGH (ref 24.5–35.6)
APTT BLD: 78.3 SEC — HIGH (ref 24.5–35.6)
APTT BLD: >200 SEC — CRITICAL HIGH (ref 24.5–35.6)
APTT BLD: >200 SEC — CRITICAL HIGH (ref 24.5–35.6)
BUN SERPL-MCNC: 31 MG/DL — HIGH (ref 7–23)
BUN SERPL-MCNC: 31 MG/DL — HIGH (ref 7–23)
CALCIUM SERPL-MCNC: 9.6 MG/DL — SIGNIFICANT CHANGE UP (ref 8.4–10.5)
CALCIUM SERPL-MCNC: 9.6 MG/DL — SIGNIFICANT CHANGE UP (ref 8.4–10.5)
CHLORIDE SERPL-SCNC: 105 MMOL/L — SIGNIFICANT CHANGE UP (ref 96–108)
CHLORIDE SERPL-SCNC: 105 MMOL/L — SIGNIFICANT CHANGE UP (ref 96–108)
CO2 SERPL-SCNC: 18 MMOL/L — LOW (ref 22–31)
CO2 SERPL-SCNC: 18 MMOL/L — LOW (ref 22–31)
CREAT SERPL-MCNC: 1.74 MG/DL — HIGH (ref 0.5–1.3)
CREAT SERPL-MCNC: 1.74 MG/DL — HIGH (ref 0.5–1.3)
EGFR: 43 ML/MIN/1.73M2 — LOW
EGFR: 43 ML/MIN/1.73M2 — LOW
GLUCOSE BLDC GLUCOMTR-MCNC: 148 MG/DL — HIGH (ref 70–99)
GLUCOSE BLDC GLUCOMTR-MCNC: 148 MG/DL — HIGH (ref 70–99)
GLUCOSE BLDC GLUCOMTR-MCNC: 81 MG/DL — SIGNIFICANT CHANGE UP (ref 70–99)
GLUCOSE BLDC GLUCOMTR-MCNC: 81 MG/DL — SIGNIFICANT CHANGE UP (ref 70–99)
GLUCOSE BLDC GLUCOMTR-MCNC: 82 MG/DL — SIGNIFICANT CHANGE UP (ref 70–99)
GLUCOSE BLDC GLUCOMTR-MCNC: 82 MG/DL — SIGNIFICANT CHANGE UP (ref 70–99)
GLUCOSE BLDC GLUCOMTR-MCNC: 90 MG/DL — SIGNIFICANT CHANGE UP (ref 70–99)
GLUCOSE BLDC GLUCOMTR-MCNC: 90 MG/DL — SIGNIFICANT CHANGE UP (ref 70–99)
GLUCOSE BLDC GLUCOMTR-MCNC: 92 MG/DL — SIGNIFICANT CHANGE UP (ref 70–99)
GLUCOSE BLDC GLUCOMTR-MCNC: 92 MG/DL — SIGNIFICANT CHANGE UP (ref 70–99)
GLUCOSE SERPL-MCNC: 61 MG/DL — LOW (ref 70–99)
GLUCOSE SERPL-MCNC: 61 MG/DL — LOW (ref 70–99)
HCT VFR BLD CALC: 46.1 % — SIGNIFICANT CHANGE UP (ref 39–50)
HCT VFR BLD CALC: 46.1 % — SIGNIFICANT CHANGE UP (ref 39–50)
HGB BLD-MCNC: 15.9 G/DL — SIGNIFICANT CHANGE UP (ref 13–17)
HGB BLD-MCNC: 15.9 G/DL — SIGNIFICANT CHANGE UP (ref 13–17)
MCHC RBC-ENTMCNC: 31.5 PG — SIGNIFICANT CHANGE UP (ref 27–34)
MCHC RBC-ENTMCNC: 31.5 PG — SIGNIFICANT CHANGE UP (ref 27–34)
MCHC RBC-ENTMCNC: 34.5 GM/DL — SIGNIFICANT CHANGE UP (ref 32–36)
MCHC RBC-ENTMCNC: 34.5 GM/DL — SIGNIFICANT CHANGE UP (ref 32–36)
MCV RBC AUTO: 91.3 FL — SIGNIFICANT CHANGE UP (ref 80–100)
MCV RBC AUTO: 91.3 FL — SIGNIFICANT CHANGE UP (ref 80–100)
NRBC # BLD: 0 /100 WBCS — SIGNIFICANT CHANGE UP (ref 0–0)
NRBC # BLD: 0 /100 WBCS — SIGNIFICANT CHANGE UP (ref 0–0)
PLATELET # BLD AUTO: 181 K/UL — SIGNIFICANT CHANGE UP (ref 150–400)
PLATELET # BLD AUTO: 181 K/UL — SIGNIFICANT CHANGE UP (ref 150–400)
POTASSIUM SERPL-MCNC: 4.6 MMOL/L — SIGNIFICANT CHANGE UP (ref 3.5–5.3)
POTASSIUM SERPL-MCNC: 4.6 MMOL/L — SIGNIFICANT CHANGE UP (ref 3.5–5.3)
POTASSIUM SERPL-SCNC: 4.6 MMOL/L — SIGNIFICANT CHANGE UP (ref 3.5–5.3)
POTASSIUM SERPL-SCNC: 4.6 MMOL/L — SIGNIFICANT CHANGE UP (ref 3.5–5.3)
RBC # BLD: 5.05 M/UL — SIGNIFICANT CHANGE UP (ref 4.2–5.8)
RBC # BLD: 5.05 M/UL — SIGNIFICANT CHANGE UP (ref 4.2–5.8)
RBC # FLD: 13.2 % — SIGNIFICANT CHANGE UP (ref 10.3–14.5)
RBC # FLD: 13.2 % — SIGNIFICANT CHANGE UP (ref 10.3–14.5)
SODIUM SERPL-SCNC: 138 MMOL/L — SIGNIFICANT CHANGE UP (ref 135–145)
SODIUM SERPL-SCNC: 138 MMOL/L — SIGNIFICANT CHANGE UP (ref 135–145)
TROPONIN T, HIGH SENSITIVITY RESULT: 203 NG/L — HIGH (ref 0–51)
TROPONIN T, HIGH SENSITIVITY RESULT: 203 NG/L — HIGH (ref 0–51)
WBC # BLD: 9.74 K/UL — SIGNIFICANT CHANGE UP (ref 3.8–10.5)
WBC # BLD: 9.74 K/UL — SIGNIFICANT CHANGE UP (ref 3.8–10.5)
WBC # FLD AUTO: 9.74 K/UL — SIGNIFICANT CHANGE UP (ref 3.8–10.5)
WBC # FLD AUTO: 9.74 K/UL — SIGNIFICANT CHANGE UP (ref 3.8–10.5)

## 2023-11-26 PROCEDURE — 93306 TTE W/DOPPLER COMPLETE: CPT | Mod: 26

## 2023-11-26 RX ORDER — SODIUM CHLORIDE 0.65 %
1 AEROSOL, SPRAY (ML) NASAL THREE TIMES A DAY
Refills: 0 | Status: COMPLETED | OUTPATIENT
Start: 2023-11-26 | End: 2023-11-29

## 2023-11-26 RX ORDER — SODIUM CHLORIDE 9 MG/ML
1000 INJECTION INTRAMUSCULAR; INTRAVENOUS; SUBCUTANEOUS
Refills: 0 | Status: DISCONTINUED | OUTPATIENT
Start: 2023-11-26 | End: 2023-11-29

## 2023-11-26 RX ADMIN — Medication 5 UNIT(S): at 17:25

## 2023-11-26 RX ADMIN — HEPARIN SODIUM 0 UNIT(S)/HR: 5000 INJECTION INTRAVENOUS; SUBCUTANEOUS at 12:01

## 2023-11-26 RX ADMIN — Medication 1 SPRAY(S): at 21:18

## 2023-11-26 RX ADMIN — INSULIN GLARGINE 20 UNIT(S): 100 INJECTION, SOLUTION SUBCUTANEOUS at 22:04

## 2023-11-26 RX ADMIN — SODIUM CHLORIDE 40 MILLILITER(S): 9 INJECTION INTRAMUSCULAR; INTRAVENOUS; SUBCUTANEOUS at 14:12

## 2023-11-26 RX ADMIN — ATORVASTATIN CALCIUM 80 MILLIGRAM(S): 80 TABLET, FILM COATED ORAL at 21:19

## 2023-11-26 RX ADMIN — Medication 40 MILLIGRAM(S): at 05:02

## 2023-11-26 RX ADMIN — HEPARIN SODIUM 0 UNIT(S)/HR: 5000 INJECTION INTRAVENOUS; SUBCUTANEOUS at 03:54

## 2023-11-26 RX ADMIN — Medication 1 SPRAY(S): at 14:12

## 2023-11-26 RX ADMIN — SACUBITRIL AND VALSARTAN 1 TABLET(S): 24; 26 TABLET, FILM COATED ORAL at 17:26

## 2023-11-26 RX ADMIN — HEPARIN SODIUM 700 UNIT(S)/HR: 5000 INJECTION INTRAVENOUS; SUBCUTANEOUS at 19:10

## 2023-11-26 RX ADMIN — CHLORHEXIDINE GLUCONATE 1 APPLICATION(S): 213 SOLUTION TOPICAL at 11:24

## 2023-11-26 RX ADMIN — Medication 5 UNIT(S): at 14:49

## 2023-11-26 RX ADMIN — Medication 50 MILLIGRAM(S): at 05:02

## 2023-11-26 RX ADMIN — SACUBITRIL AND VALSARTAN 1 TABLET(S): 24; 26 TABLET, FILM COATED ORAL at 05:01

## 2023-11-26 RX ADMIN — HEPARIN SODIUM 900 UNIT(S)/HR: 5000 INJECTION INTRAVENOUS; SUBCUTANEOUS at 05:01

## 2023-11-26 RX ADMIN — Medication 5 UNIT(S): at 08:26

## 2023-11-26 RX ADMIN — HEPARIN SODIUM 700 UNIT(S)/HR: 5000 INJECTION INTRAVENOUS; SUBCUTANEOUS at 21:19

## 2023-11-26 RX ADMIN — Medication 50 MILLIGRAM(S): at 17:26

## 2023-11-26 RX ADMIN — HEPARIN SODIUM 700 UNIT(S)/HR: 5000 INJECTION INTRAVENOUS; SUBCUTANEOUS at 13:05

## 2023-11-26 RX ADMIN — CLOPIDOGREL BISULFATE 75 MILLIGRAM(S): 75 TABLET, FILM COATED ORAL at 11:24

## 2023-11-26 NOTE — PROGRESS NOTE ADULT - SUBJECTIVE AND OBJECTIVE BOX
Date of service: 23 @ 13:35      Patient is a 68y old  Male who presents with a chief complaint of CP (2023 13:02)                                                               INTERVAL HPI/OVERNIGHT EVENTS:    REVIEW OF SYSTEMS:    no cp / sob                                                                                                                                                                                                                                                                                Medications:  MEDICATIONS  (STANDING):  atorvastatin 80 milliGRAM(s) Oral at bedtime  chlorhexidine 2% Cloths 1 Application(s) Topical daily  clopidogrel Tablet 75 milliGRAM(s) Oral daily  dextrose 5%. 1000 milliLiter(s) (100 mL/Hr) IV Continuous <Continuous>  dextrose 5%. 1000 milliLiter(s) (50 mL/Hr) IV Continuous <Continuous>  dextrose 50% Injectable 25 Gram(s) IV Push once  dextrose 50% Injectable 25 Gram(s) IV Push once  dextrose 50% Injectable 12.5 Gram(s) IV Push once  furosemide    Tablet 40 milliGRAM(s) Oral daily  glucagon  Injectable 1 milliGRAM(s) IntraMuscular once  heparin  Infusion.  Unit(s)/Hr (11 mL/Hr) IV Continuous <Continuous>  insulin glargine Injectable (LANTUS) 20 Unit(s) SubCutaneous at bedtime  insulin lispro (ADMELOG) corrective regimen sliding scale   SubCutaneous three times a day before meals  insulin lispro (ADMELOG) corrective regimen sliding scale   SubCutaneous at bedtime  insulin lispro Injectable (ADMELOG) 5 Unit(s) SubCutaneous three times a day before meals  metoprolol tartrate 50 milliGRAM(s) Oral two times a day  sacubitril 49 mG/valsartan 51 mG 1 Tablet(s) Oral two times a day  sodium chloride 0.65% Nasal 1 Spray(s) Both Nostrils three times a day  sodium chloride 0.9%. 1000 milliLiter(s) (40 mL/Hr) IV Continuous <Continuous>  sodium chloride 0.9%. 500 milliLiter(s) (50 mL/Hr) IV Continuous <Continuous>    MEDICATIONS  (PRN):  acetaminophen     Tablet .. 650 milliGRAM(s) Oral every 6 hours PRN Temp greater or equal to 38C (100.4F), Mild Pain (1 - 3)  aluminum hydroxide/magnesium hydroxide/simethicone Suspension 30 milliLiter(s) Oral every 4 hours PRN Dyspepsia  dextrose Oral Gel 15 Gram(s) Oral once PRN Blood Glucose LESS THAN 70 milliGRAM(s)/deciliter  heparin   Injectable 5000 Unit(s) IV Push every 6 hours PRN For aPTT less than 40  heparin   Injectable 2500 Unit(s) IV Push every 6 hours PRN For aPTT between 40 - 57  melatonin 3 milliGRAM(s) Oral at bedtime PRN Insomnia  ondansetron Injectable 4 milliGRAM(s) IV Push every 8 hours PRN Nausea and/or Vomiting       Allergies    No Known Allergies    Intolerances      Vital Signs Last 24 Hrs  T(C): 36.4 (2023 11:18), Max: 36.8 (2023 20:55)  T(F): 97.5 (2023 11:18), Max: 98.2 (2023 20:55)  HR: 78 (:18) (68 - 85)  BP: 113/70 (2023 11:18) (107/71 - 131/79)  BP(mean): --  RR: 18 (2023 11:18) (18 - 18)  SpO2: 98% (:18) (96% - 99%)    Parameters below as of 2023 11:18  Patient On (Oxygen Delivery Method): room air      CAPILLARY BLOOD GLUCOSE      POCT Blood Glucose.: 81 mg/dL (2023 08:11)  POCT Blood Glucose.: 155 mg/dL (2023 21:01)  POCT Blood Glucose.: 143 mg/dL (2023 17:10)       @ : @ 07:00  --------------------------------------------------------  IN: 584 mL / OUT: 1000 mL / NET: -416 mL     @ :  -   @ 13:35  --------------------------------------------------------  IN: 0 mL / OUT: 400 mL / NET: -400 mL      Physical Exam:    Daily     Daily Weight in k.2 (2023 08:15)  General:  Well appearing, NAD, not cachetic  HEENT:  Nonicteric, PERRLA  CV:  RRR, S1S2   Lungs:  CTA B/L, no wheezes, rales, rhonchi  Abdomen:  Soft, non-tender, no distended, positive BS  Extremities:  2+ pulses, no c/c, no edema  Skin:  Warm and dry, no rashes  :  No moreno  Neuro:  AAOx3, non-focal, grossly intact                                                                                                                                                                                                                                                                                                LABS:                               15.9   9.74  )-----------( 181      ( 2023 03:04 )             46.1                          138  |  105  |  31<H>  ----------------------------<  61<L>  4.6   |  18<L>  |  1.74<H>    Ca    9.6      2023 06:10  Mg     2.3     -    TPro  7.5  /  Alb  4.8  /  TBili  1.3<H>  /  DBili  x   /  AST  26  /  ALT  21  /  AlkPhos  135<H>  11-24                       RADIOLOGY & ADDITIONAL TESTS         I personally reviewed: [  ]EKG   [  ]CXR    [  ] CT      A/P:         Discussed with :     Derek consultants' Notes   Time spent :

## 2023-11-26 NOTE — PROGRESS NOTE ADULT - ASSESSMENT
68 M PMH PAF, CAD multiple PCIs w/ recent TITO to ostial LM ( 6/2/23), CKD3, IDDM2, CHF,  p/w left CP that began while driving around 5pm yesterday  First Trop - 26 at 20:09. Second Trop 160 Third 282  LDL 44 Pain free at present   - Cont Plavix   - Agree with Heparin gtt - given Unstable Angina / NSTEMI presentation   - if pain recurs will need urgent cath eval  - Cont BB/ARNI  - check Echo  - cont Tele  - will need Cardiac CATH tomorrow R/B/A d/w patient. HE agrees to proceed.   D/W Dr Mcdaniel        68 M PMH PAF, CAD multiple PCIs w/ recent TITO to ostial LM ( 6/2/23), CKD3, IDDM2, CHF,  p/w left CP that began while driving around 5pm yesterday  First Trop - 26 at 20:09. Second Trop 160 Third 282 Fourth 203 peak Trop 11/25 at 5:27AM  LDL 44 Pain free at present   - Cont Plavix   - Agree with Heparin gtt - given Unstable Angina / NSTEMI presentation   - if pain recurs will need urgent cath eval  - Cont BB/ARNI  - check Echo  - cont Tele  - will need Cardiac CATH tomorrow R/B/A d/w patient. HE agrees to proceed.   D/W Dr Mcdaniel

## 2023-11-26 NOTE — PROGRESS NOTE ADULT - SUBJECTIVE AND OBJECTIVE BOX
SUBJECTIVE:  NO CP no SOB. Sitting up    MEDICATIONS:  furosemide    Tablet 40 milliGRAM(s) Oral daily  metoprolol tartrate 50 milliGRAM(s) Oral two times a day  sacubitril 49 mG/valsartan 51 mG 1 Tablet(s) Oral two times a day        acetaminophen     Tablet .. 650 milliGRAM(s) Oral every 6 hours PRN  melatonin 3 milliGRAM(s) Oral at bedtime PRN  ondansetron Injectable 4 milliGRAM(s) IV Push every 8 hours PRN    aluminum hydroxide/magnesium hydroxide/simethicone Suspension 30 milliLiter(s) Oral every 4 hours PRN    atorvastatin 80 milliGRAM(s) Oral at bedtime  dextrose 50% Injectable 25 Gram(s) IV Push once  dextrose 50% Injectable 25 Gram(s) IV Push once  dextrose 50% Injectable 12.5 Gram(s) IV Push once  dextrose Oral Gel 15 Gram(s) Oral once PRN  glucagon  Injectable 1 milliGRAM(s) IntraMuscular once  insulin glargine Injectable (LANTUS) 20 Unit(s) SubCutaneous at bedtime  insulin lispro (ADMELOG) corrective regimen sliding scale   SubCutaneous three times a day before meals  insulin lispro (ADMELOG) corrective regimen sliding scale   SubCutaneous at bedtime  insulin lispro Injectable (ADMELOG) 5 Unit(s) SubCutaneous three times a day before meals    chlorhexidine 2% Cloths 1 Application(s) Topical daily  clopidogrel Tablet 75 milliGRAM(s) Oral daily  dextrose 5%. 1000 milliLiter(s) IV Continuous <Continuous>  dextrose 5%. 1000 milliLiter(s) IV Continuous <Continuous>  heparin   Injectable 2500 Unit(s) IV Push every 6 hours PRN  heparin   Injectable 5000 Unit(s) IV Push every 6 hours PRN  heparin  Infusion.  Unit(s)/Hr IV Continuous <Continuous>  sodium chloride 0.65% Nasal 1 Spray(s) Both Nostrils three times a day  sodium chloride 0.9%. 500 milliLiter(s) IV Continuous <Continuous>      REVIEW OF SYSTEMS:    CONSTITUTIONAL: No fever, weight loss, or fatigue  EYES: No eye pain, visual disturbances, or discharge  NECK: No pain or stiffness  RESPIRATORY: No cough, wheezing, chills or hemoptysis; No Shortness of Breath  CARDIOVASCULAR: No chest pain, palpitations, dizziness, or leg swelling  GASTROINTESTINAL: No abdominal or epigastric pain. No nausea, vomiting, or hematemesis; No diarrhea or constipation. No melena or hematochezia.  GENITOURINARY: No dysuria, frequency, hematuria, or incontinence  NEUROLOGICAL: No headaches, memory loss, loss of strength, numbness, or tremors  SKIN: No itching, burning, rashes, or lesions   LYMPH Nodes: No enlarged glands  MUSCULOSKELETAL: No joint pain or swelling; No muscle, back, or extremity pain  All other review of systems are negative.  	  [ ] Unable to obtain    PHYSICAL EXAM:  T(C): 36.4 (11-26-23 @ 11:18), Max: 36.8 (11-25-23 @ 20:55)  HR: 78 (11-26-23 @ 11:18) (68 - 85)  BP: 113/70 (11-26-23 @ 11:18) (107/71 - 131/79)  RR: 18 (11-26-23 @ 11:18) (18 - 18)  SpO2: 98% (11-26-23 @ 11:18) (96% - 99%)  Wt(kg): --  I&O's Summary    25 Nov 2023 07:01  -  26 Nov 2023 07:00  --------------------------------------------------------  IN: 584 mL / OUT: 1000 mL / NET: -416 mL    26 Nov 2023 07:01  -  26 Nov 2023 13:03  --------------------------------------------------------  IN: 0 mL / OUT: 400 mL / NET: -400 mL          PHYSICAL EXAM    Appearance: Normal	  HEENT:   Normal oral mucosa, PERRL, EOMI	  NECK: Soft and supple, No LAD, No JVD  Cardiovascular: Regular Rate and Rhythm, Normal S1 S2, No murmurs, No clicks, gallops or rubs  Respiratory: Lungs clear to auscultation	  Gastrointestinal:  Soft, Non-tender, + BS	  Skin: No rashes, No ecchymoses, No cyanosis  Neurologic: Non-focal  Extremities: No clubbing, cyanosis or edema  Vascular: Peripheral pulses palpable 2+ bilaterally    LABS:	 	                            15.9   9.74  )-----------( 181      ( 26 Nov 2023 03:04 )             46.1     11-26    138  |  105  |  31<H>  ----------------------------<  61<L>  4.6   |  18<L>  |  1.74<H>    Ca    9.6      26 Nov 2023 06:10  Mg     2.3     11-25    TPro  7.5  /  Alb  4.8  /  TBili  1.3<H>  /  DBili  x   /  AST  26  /  ALT  21  /  AlkPhos  135<H>  11-24

## 2023-11-26 NOTE — PROVIDER CONTACT NOTE (CRITICAL VALUE NOTIFICATION) - ACTION/TREATMENT ORDERED:
Following nomogram, Hep Gtt paused for 60 minutes, will resume at 9 mL/hr.
May follow nomogram: hold x 60min, then restart at 7ml//hr.

## 2023-11-26 NOTE — PROVIDER CONTACT NOTE (CRITICAL VALUE NOTIFICATION) - ASSESSMENT
No acute changes in pt's status, no s/s visible bleedings. Heparin currently running at 9ml/hr.
Patient is AOx4 VSS. No complaint of chest pain, shortness of breath or palpitations. No signs of bleeding noted on assessment.

## 2023-11-26 NOTE — PROVIDER CONTACT NOTE (CRITICAL VALUE NOTIFICATION) - BACKGROUND
Pt admitted with dx. Angina, PMH includes AF, CAD with multiple PCIs. Pending cath, started on Heparin gtt under full anticoagulation nomogram yesterday.
Dx: chest pain  Hx: CHF, CAD, HTN, AFIB, stents

## 2023-11-26 NOTE — PROVIDER CONTACT NOTE (CRITICAL VALUE NOTIFICATION) - SITUATION
Bed: 18  Expected date:   Expected time:   Means of arrival:   Comments:  Triage:  CP  
Dr Malik at [facility] evaluating pt  
Pt's belongings in bag on cart to accompany her to room. Pt left dept w/transport in stable condition.   
Report given to CECILIA Vick on unit 40  
Tele box 43 verified  
PTT >189.5
Lab called with a critical for patient. APTT came back > 200.

## 2023-11-27 LAB
ANION GAP SERPL CALC-SCNC: 14 MMOL/L — SIGNIFICANT CHANGE UP (ref 5–17)
APTT BLD: 94.6 SEC — HIGH (ref 24.5–35.6)
APTT BLD: 94.6 SEC — HIGH (ref 24.5–35.6)
BUN SERPL-MCNC: 37 MG/DL — HIGH (ref 7–23)
BUN SERPL-MCNC: 37 MG/DL — HIGH (ref 7–23)
BUN SERPL-MCNC: 41 MG/DL — HIGH (ref 7–23)
BUN SERPL-MCNC: 41 MG/DL — HIGH (ref 7–23)
CALCIUM SERPL-MCNC: 9.2 MG/DL — SIGNIFICANT CHANGE UP (ref 8.4–10.5)
CALCIUM SERPL-MCNC: 9.2 MG/DL — SIGNIFICANT CHANGE UP (ref 8.4–10.5)
CALCIUM SERPL-MCNC: 9.9 MG/DL — SIGNIFICANT CHANGE UP (ref 8.4–10.5)
CALCIUM SERPL-MCNC: 9.9 MG/DL — SIGNIFICANT CHANGE UP (ref 8.4–10.5)
CHLORIDE SERPL-SCNC: 103 MMOL/L — SIGNIFICANT CHANGE UP (ref 96–108)
CHLORIDE SERPL-SCNC: 103 MMOL/L — SIGNIFICANT CHANGE UP (ref 96–108)
CHLORIDE SERPL-SCNC: 107 MMOL/L — SIGNIFICANT CHANGE UP (ref 96–108)
CHLORIDE SERPL-SCNC: 107 MMOL/L — SIGNIFICANT CHANGE UP (ref 96–108)
CO2 SERPL-SCNC: 19 MMOL/L — LOW (ref 22–31)
CREAT SERPL-MCNC: 1.86 MG/DL — HIGH (ref 0.5–1.3)
CREAT SERPL-MCNC: 1.86 MG/DL — HIGH (ref 0.5–1.3)
CREAT SERPL-MCNC: 1.95 MG/DL — HIGH (ref 0.5–1.3)
CREAT SERPL-MCNC: 1.95 MG/DL — HIGH (ref 0.5–1.3)
EGFR: 37 ML/MIN/1.73M2 — LOW
EGFR: 37 ML/MIN/1.73M2 — LOW
EGFR: 39 ML/MIN/1.73M2 — LOW
EGFR: 39 ML/MIN/1.73M2 — LOW
GLUCOSE BLDC GLUCOMTR-MCNC: 107 MG/DL — HIGH (ref 70–99)
GLUCOSE BLDC GLUCOMTR-MCNC: 107 MG/DL — HIGH (ref 70–99)
GLUCOSE BLDC GLUCOMTR-MCNC: 143 MG/DL — HIGH (ref 70–99)
GLUCOSE BLDC GLUCOMTR-MCNC: 143 MG/DL — HIGH (ref 70–99)
GLUCOSE BLDC GLUCOMTR-MCNC: 69 MG/DL — LOW (ref 70–99)
GLUCOSE BLDC GLUCOMTR-MCNC: 69 MG/DL — LOW (ref 70–99)
GLUCOSE BLDC GLUCOMTR-MCNC: 77 MG/DL — SIGNIFICANT CHANGE UP (ref 70–99)
GLUCOSE BLDC GLUCOMTR-MCNC: 77 MG/DL — SIGNIFICANT CHANGE UP (ref 70–99)
GLUCOSE BLDC GLUCOMTR-MCNC: 92 MG/DL — SIGNIFICANT CHANGE UP (ref 70–99)
GLUCOSE BLDC GLUCOMTR-MCNC: 92 MG/DL — SIGNIFICANT CHANGE UP (ref 70–99)
GLUCOSE SERPL-MCNC: 134 MG/DL — HIGH (ref 70–99)
GLUCOSE SERPL-MCNC: 134 MG/DL — HIGH (ref 70–99)
GLUCOSE SERPL-MCNC: 94 MG/DL — SIGNIFICANT CHANGE UP (ref 70–99)
GLUCOSE SERPL-MCNC: 94 MG/DL — SIGNIFICANT CHANGE UP (ref 70–99)
HCT VFR BLD CALC: 46.7 % — SIGNIFICANT CHANGE UP (ref 39–50)
HCT VFR BLD CALC: 46.7 % — SIGNIFICANT CHANGE UP (ref 39–50)
HGB BLD-MCNC: 15.9 G/DL — SIGNIFICANT CHANGE UP (ref 13–17)
HGB BLD-MCNC: 15.9 G/DL — SIGNIFICANT CHANGE UP (ref 13–17)
MAGNESIUM SERPL-MCNC: 2.2 MG/DL — SIGNIFICANT CHANGE UP (ref 1.6–2.6)
MCHC RBC-ENTMCNC: 31.3 PG — SIGNIFICANT CHANGE UP (ref 27–34)
MCHC RBC-ENTMCNC: 31.3 PG — SIGNIFICANT CHANGE UP (ref 27–34)
MCHC RBC-ENTMCNC: 34 GM/DL — SIGNIFICANT CHANGE UP (ref 32–36)
MCHC RBC-ENTMCNC: 34 GM/DL — SIGNIFICANT CHANGE UP (ref 32–36)
MCV RBC AUTO: 91.9 FL — SIGNIFICANT CHANGE UP (ref 80–100)
MCV RBC AUTO: 91.9 FL — SIGNIFICANT CHANGE UP (ref 80–100)
NRBC # BLD: 0 /100 WBCS — SIGNIFICANT CHANGE UP (ref 0–0)
NRBC # BLD: 0 /100 WBCS — SIGNIFICANT CHANGE UP (ref 0–0)
PHOSPHATE SERPL-MCNC: 3.4 MG/DL — SIGNIFICANT CHANGE UP (ref 2.5–4.5)
PHOSPHATE SERPL-MCNC: 3.4 MG/DL — SIGNIFICANT CHANGE UP (ref 2.5–4.5)
PLATELET # BLD AUTO: 183 K/UL — SIGNIFICANT CHANGE UP (ref 150–400)
PLATELET # BLD AUTO: 183 K/UL — SIGNIFICANT CHANGE UP (ref 150–400)
POTASSIUM SERPL-MCNC: 4.2 MMOL/L — SIGNIFICANT CHANGE UP (ref 3.5–5.3)
POTASSIUM SERPL-MCNC: 4.2 MMOL/L — SIGNIFICANT CHANGE UP (ref 3.5–5.3)
POTASSIUM SERPL-MCNC: 4.9 MMOL/L — SIGNIFICANT CHANGE UP (ref 3.5–5.3)
POTASSIUM SERPL-MCNC: 4.9 MMOL/L — SIGNIFICANT CHANGE UP (ref 3.5–5.3)
POTASSIUM SERPL-SCNC: 4.2 MMOL/L — SIGNIFICANT CHANGE UP (ref 3.5–5.3)
POTASSIUM SERPL-SCNC: 4.2 MMOL/L — SIGNIFICANT CHANGE UP (ref 3.5–5.3)
POTASSIUM SERPL-SCNC: 4.9 MMOL/L — SIGNIFICANT CHANGE UP (ref 3.5–5.3)
POTASSIUM SERPL-SCNC: 4.9 MMOL/L — SIGNIFICANT CHANGE UP (ref 3.5–5.3)
RBC # BLD: 5.08 M/UL — SIGNIFICANT CHANGE UP (ref 4.2–5.8)
RBC # BLD: 5.08 M/UL — SIGNIFICANT CHANGE UP (ref 4.2–5.8)
RBC # FLD: 13 % — SIGNIFICANT CHANGE UP (ref 10.3–14.5)
RBC # FLD: 13 % — SIGNIFICANT CHANGE UP (ref 10.3–14.5)
SODIUM SERPL-SCNC: 136 MMOL/L — SIGNIFICANT CHANGE UP (ref 135–145)
SODIUM SERPL-SCNC: 136 MMOL/L — SIGNIFICANT CHANGE UP (ref 135–145)
SODIUM SERPL-SCNC: 140 MMOL/L — SIGNIFICANT CHANGE UP (ref 135–145)
SODIUM SERPL-SCNC: 140 MMOL/L — SIGNIFICANT CHANGE UP (ref 135–145)
WBC # BLD: 8.42 K/UL — SIGNIFICANT CHANGE UP (ref 3.8–10.5)
WBC # BLD: 8.42 K/UL — SIGNIFICANT CHANGE UP (ref 3.8–10.5)
WBC # FLD AUTO: 8.42 K/UL — SIGNIFICANT CHANGE UP (ref 3.8–10.5)
WBC # FLD AUTO: 8.42 K/UL — SIGNIFICANT CHANGE UP (ref 3.8–10.5)

## 2023-11-27 PROCEDURE — 92920 PRQ TRLUML C ANGIOP 1ART&/BR: CPT | Mod: LC

## 2023-11-27 PROCEDURE — 99152 MOD SED SAME PHYS/QHP 5/>YRS: CPT

## 2023-11-27 PROCEDURE — 93454 CORONARY ARTERY ANGIO S&I: CPT | Mod: 26,59

## 2023-11-27 PROCEDURE — 92941 PRQ TRLML REVSC TOT OCCL AMI: CPT | Mod: LD

## 2023-11-27 PROCEDURE — 93010 ELECTROCARDIOGRAM REPORT: CPT

## 2023-11-27 RX ORDER — SODIUM CHLORIDE 9 MG/ML
1000 INJECTION INTRAMUSCULAR; INTRAVENOUS; SUBCUTANEOUS
Refills: 0 | Status: DISCONTINUED | OUTPATIENT
Start: 2023-11-27 | End: 2023-11-29

## 2023-11-27 RX ORDER — DEXTROSE 50 % IN WATER 50 %
15 SYRINGE (ML) INTRAVENOUS ONCE
Refills: 0 | Status: COMPLETED | OUTPATIENT
Start: 2023-11-27 | End: 2023-11-27

## 2023-11-27 RX ORDER — HEPARIN SODIUM 5000 [USP'U]/ML
700 INJECTION INTRAVENOUS; SUBCUTANEOUS
Qty: 25000 | Refills: 0 | Status: DISCONTINUED | OUTPATIENT
Start: 2023-11-28 | End: 2023-11-28

## 2023-11-27 RX ORDER — ASPIRIN/CALCIUM CARB/MAGNESIUM 324 MG
81 TABLET ORAL DAILY
Refills: 0 | Status: DISCONTINUED | OUTPATIENT
Start: 2023-11-27 | End: 2023-11-29

## 2023-11-27 RX ORDER — HEPARIN SODIUM 5000 [USP'U]/ML
5000 INJECTION INTRAVENOUS; SUBCUTANEOUS EVERY 6 HOURS
Refills: 0 | Status: DISCONTINUED | OUTPATIENT
Start: 2023-11-27 | End: 2023-11-28

## 2023-11-27 RX ORDER — HEPARIN SODIUM 5000 [USP'U]/ML
2500 INJECTION INTRAVENOUS; SUBCUTANEOUS EVERY 6 HOURS
Refills: 0 | Status: DISCONTINUED | OUTPATIENT
Start: 2023-11-27 | End: 2023-11-28

## 2023-11-27 RX ADMIN — INSULIN GLARGINE 20 UNIT(S): 100 INJECTION, SOLUTION SUBCUTANEOUS at 22:39

## 2023-11-27 RX ADMIN — SACUBITRIL AND VALSARTAN 1 TABLET(S): 24; 26 TABLET, FILM COATED ORAL at 20:35

## 2023-11-27 RX ADMIN — Medication 15 GRAM(S): at 22:18

## 2023-11-27 RX ADMIN — SODIUM CHLORIDE 40 MILLILITER(S): 9 INJECTION INTRAMUSCULAR; INTRAVENOUS; SUBCUTANEOUS at 05:06

## 2023-11-27 RX ADMIN — ATORVASTATIN CALCIUM 80 MILLIGRAM(S): 80 TABLET, FILM COATED ORAL at 22:10

## 2023-11-27 RX ADMIN — Medication 1 SPRAY(S): at 05:05

## 2023-11-27 RX ADMIN — HEPARIN SODIUM 700 UNIT(S)/HR: 5000 INJECTION INTRAVENOUS; SUBCUTANEOUS at 01:37

## 2023-11-27 RX ADMIN — Medication 50 MILLIGRAM(S): at 05:05

## 2023-11-27 RX ADMIN — CLOPIDOGREL BISULFATE 75 MILLIGRAM(S): 75 TABLET, FILM COATED ORAL at 12:49

## 2023-11-27 RX ADMIN — Medication 50 MILLIGRAM(S): at 20:35

## 2023-11-27 RX ADMIN — SACUBITRIL AND VALSARTAN 1 TABLET(S): 24; 26 TABLET, FILM COATED ORAL at 05:06

## 2023-11-27 RX ADMIN — Medication 1 SPRAY(S): at 22:11

## 2023-11-27 RX ADMIN — Medication 5 UNIT(S): at 08:04

## 2023-11-27 RX ADMIN — SODIUM CHLORIDE 100 MILLILITER(S): 9 INJECTION INTRAMUSCULAR; INTRAVENOUS; SUBCUTANEOUS at 16:06

## 2023-11-27 NOTE — PROGRESS NOTE ADULT - SUBJECTIVE AND OBJECTIVE BOX
SUBJECTIVE: The patient denies chest pain, shortness of breath, arm pain or jaw pain, dizziness or palpitations.    MEDICATIONS  (STANDING):  atorvastatin 80 milliGRAM(s) Oral at bedtime  chlorhexidine 2% Cloths 1 Application(s) Topical daily  clopidogrel Tablet 75 milliGRAM(s) Oral daily  dextrose 5%. 1000 milliLiter(s) (100 mL/Hr) IV Continuous <Continuous>  dextrose 5%. 1000 milliLiter(s) (50 mL/Hr) IV Continuous <Continuous>  dextrose 50% Injectable 25 Gram(s) IV Push once  dextrose 50% Injectable 12.5 Gram(s) IV Push once  dextrose 50% Injectable 25 Gram(s) IV Push once  glucagon  Injectable 1 milliGRAM(s) IntraMuscular once  heparin  Infusion.  Unit(s)/Hr (11 mL/Hr) IV Continuous <Continuous>  insulin glargine Injectable (LANTUS) 20 Unit(s) SubCutaneous at bedtime  insulin lispro (ADMELOG) corrective regimen sliding scale   SubCutaneous three times a day before meals  insulin lispro (ADMELOG) corrective regimen sliding scale   SubCutaneous at bedtime  insulin lispro Injectable (ADMELOG) 5 Unit(s) SubCutaneous three times a day before meals  metoprolol tartrate 50 milliGRAM(s) Oral two times a day  sacubitril 49 mG/valsartan 51 mG 1 Tablet(s) Oral two times a day  sodium chloride 0.65% Nasal 1 Spray(s) Both Nostrils three times a day  sodium chloride 0.9%. 1000 milliLiter(s) (40 mL/Hr) IV Continuous <Continuous>  sodium chloride 0.9%. 500 milliLiter(s) (50 mL/Hr) IV Continuous <Continuous>    MEDICATIONS  (PRN):  acetaminophen     Tablet .. 650 milliGRAM(s) Oral every 6 hours PRN Temp greater or equal to 38C (100.4F), Mild Pain (1 - 3)  aluminum hydroxide/magnesium hydroxide/simethicone Suspension 30 milliLiter(s) Oral every 4 hours PRN Dyspepsia  dextrose Oral Gel 15 Gram(s) Oral once PRN Blood Glucose LESS THAN 70 milliGRAM(s)/deciliter  heparin   Injectable 5000 Unit(s) IV Push every 6 hours PRN For aPTT less than 40  heparin   Injectable 2500 Unit(s) IV Push every 6 hours PRN For aPTT between 40 - 57  melatonin 3 milliGRAM(s) Oral at bedtime PRN Insomnia  ondansetron Injectable 4 milliGRAM(s) IV Push every 8 hours PRN Nausea and/or Vomiting        REVIEW OF SYSTEMS:    CONSTITUTIONAL: No fever, weight loss, or fatigue  EYES: No eye pain, visual disturbances, or discharge  NECK: No pain or stiffness  RESPIRATORY: No cough, wheezing, chills or hemoptysis; No Shortness of Breath  CARDIOVASCULAR: No chest pain, palpitations, dizziness, or leg swelling  GASTROINTESTINAL: No abdominal or epigastric pain. No nausea, vomiting, or hematemesis; No diarrhea or constipation. No melena or hematochezia.  GENITOURINARY: No dysuria, frequency, hematuria, or incontinence  NEUROLOGICAL: No headaches, memory loss, loss of strength, numbness, or tremors  SKIN: No itching, burning, rashes, or lesions   LYMPH Nodes: No enlarged glands  MUSCULOSKELETAL: No joint pain or swelling; No muscle, back, or extremity pain  All other review of systems are negative.  	  [ ] Unable to obtain    PHYSICAL EXAM:  T(F): 98.3 (11-27-23 @ 04:35), Max: 98.3 (11-27-23 @ 04:35)  HR: 77 (11-27-23 @ 04:35) (74 - 78)  BP: 116/74 (11-27-23 @ 04:35) (110/66 - 116/74)  RR: 18 (11-27-23 @ 04:35) (18 - 18)  SpO2: 98% (11-27-23 @ 04:35) (97% - 98%)  Wt(kg): --  ,   I&O's Summary    26 Nov 2023 07:01  -  27 Nov 2023 07:00  --------------------------------------------------------  IN: 480 mL / OUT: 900 mL / NET: -420 mL            PHYSICAL EXAM    Appearance: Normal	  HEENT:   Normal oral mucosa, PERRL, EOMI	  NECK: Soft and supple, No LAD, No JVD  Cardiovascular: Regular Rate and Rhythm, Normal S1 S2, No murmurs, No clicks, gallops or rubs  Respiratory: Lungs clear to auscultation	  Gastrointestinal:  Soft, Non-tender, + BS	  Skin: No rashes, No ecchymoses, No cyanosis  Neurologic: Non-focal  Extremities: No clubbing, cyanosis or edema  Vascular: Peripheral pulses palpable 2+ bilaterally    LABS:	 	                            15.9   9.74  )-----------( 181      ( 26 Nov 2023 03:04 )             46.1     11-26    138  |  105  |  31<H>  ----------------------------<  61<L>  4.6   |  18<L>  |  1.74<H>    Ca    9.6      26 Nov 2023 06:10  Mg     2.3     11-25    TPro  7.5  /  Alb  4.8  /  TBili  1.3<H>  /  DBili  x   /  AST  26  /  ALT  21  /  AlkPhos  135<H>  11-24  -----------------------------------------------------------------------------------------------------------------------------                        15.9   8.42  )-----------( 183      ( 27 Nov 2023 05:38 )             46.7               11-27    140  |  107  |  37<H>  ----------------------------<  94  4.2   |  19<L>  |  1.86<H>    Ca    9.2      27 Nov 2023 05:37  Phos  3.4     11-27  Mg     2.2     11-27      PTT - ( 27 Nov 2023 01:05 )  PTT:94.6 sec                   Urinalysis Basic - ( 27 Nov 2023 05:37 )    Color: x / Appearance: x / SG: x / pH: x  Gluc: 94 mg/dL / Ketone: x  / Bili: x / Urobili: x   Blood: x / Protein: x / Nitrite: x   Leuk Esterase: x / RBC: x / WBC x   Sq Epi: x / Non Sq Epi: x / Bacteria: x

## 2023-11-27 NOTE — DIETITIAN INITIAL EVALUATION ADULT - NS FNS DIET ORDER
Diet, DASH/TLC:   Sodium & Cholesterol Restricted  Consistent Carbohydrate {No Snacks} (CSTCHO) (11-25-23 @ 13:00) [Active]

## 2023-11-27 NOTE — CONSULT NOTE ADULT - SUBJECTIVE AND OBJECTIVE BOX
HPI: Mr. Velázquez is a 68 year-old man with history of multiple medical issues including type 2 diabetes mellitus, atrial fibrillation, coronary artery disease, and congestive heart failure with preserved EF. He presented 23 to the [facility] ER with acute left-sided chest pain which occurred while driving, and without radiation nor other associated symptoms. He ruled in for MI by troponins (peak 282; normal range 0-51). He was placed on a heparin gtt in the ER for acute coronary syndrome.     Mr. Velázquez was noted on admission to have a creatinine of 1.71mg/dL; it is 1.86mg/dL today and was as high as 1.95mg/dL yesterday. In light of the azotemia, a renal consultation was requested.          PAST MEDICAL & SURGICAL HISTORY:  HTN  HLD  DM2  CAD - 6 stents  AFib  CHF  Carotid stenosis - left CEA    Allergies  No Known Allergies    SOCIAL HISTORY:  Former smoker    FAMILY HISTORY:  Family history of heart disease -father  age 71  FH: atrial fibrillation -sister    REVIEW OF SYSTEMS:  CONSTITUTIONAL: No weakness, fevers or chills  EYES/ENT: No visual changes;  No vertigo or throat pain   NECK: No pain or stiffness  RESPIRATORY: No cough, wheezing, hemoptysis; No shortness of breath  CARDIOVASCULAR: (+)chest pain  GASTROINTESTINAL: No abdominal or epigastric pain. No nausea, vomiting, or hematemesis; No diarrhea or constipation. No melena or hematochezia.  GENITOURINARY: No dysuria, frequency or hematuria  NEUROLOGICAL: No numbness or weakness  SKIN: No itching, burning, rashes, or lesions   All other review of systems is negative unless indicated above.    VITAL:  T(C): , Max: 36.8 (23 @ 04:35)  T(F): , Max: 98.3 (23 @ 04:35)  HR: 77 (23 @ 04:35)  BP: 116/74 (23 @ 04:35)  RR: 18 (23 @ 04:35)  SpO2: 98% (23 @ 04:35)    PHYSICAL EXAM:  Constitutional: NAD, Alert  HEENT: NCAT, MMM  Neck: Supple, No JVD  Respiratory: CTA-b/l  Cardiovascular: RRR s1s2, no m/r/g  Gastrointestinal: BS+, soft, NT/ND  Extremities: No peripheral edema b/l  Neurological: no focal deficits; strength grossly intact  Back: no CVAT b/l  Skin: No rashes, no nevi    LABS:                        15.9   8.42  )-----------( 183      ( 2023 05:38 )             46.7     Na(140)/K(4.2)/Cl(107)/HCO3(19)/BUN(37)/Cr(1.86)Glu(94)/Ca(9.2)/Mg(2.2)/PO4(3.4)     @ 05:37  Na(136)/K(4.9)/Cl(103)/HCO3(19)/BUN(41)/Cr(1.95)Glu(134)/Ca(9.9)/Mg(2.2)/PO4(--)     @ 01:05  Na(138)/K(4.6)/Cl(105)/HCO3(18)/BUN(31)/Cr(1.74)Glu(61)/Ca(9.6)/Mg(--)/PO4(--)     @ 06:10  Na(140)/K(4.5)/Cl(109)/HCO3(21)/BUN(23)/Cr(1.52)Glu(114)/Ca(9.3)/Mg(2.3)/PO4(--)     @ 05:27  Na(140)/K(4.3)/Cl(102)/HCO3(23)/BUN(24)/Cr(1.71)Glu(236)/Ca(10.0)/Mg(2.4)/PO4(--)     @ 20:09    Trop 26==>160==>282==>203      (6/3/23) - BUN 28, Cr 1.43  (23) - UA - trace protein, 2wbc, 1rbc; Upr 170mg/L, Up/c 0.2g/g    IMAGING:  < from: CT Angio Abdomen and Pelvis w/ IV Cont (23 @ 22:56) >  No evidence of aortic dissection. No central pulmonary embolism.  Nonspecific groundglass focus in the right upper lobe may be followed up   with chest CT in 6 weeks.  No acute osseous abnormality.  No acute findings in the abdomen or pelvis.  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 Poststenotic dilatation.    < from: Xray Chest 1 View- PORTABLE-Urgent (23 @ 21:11) >  Clear lungs. No pleural effusions or pneumothorax.  Cardiac and mediastinal silhouettes within normal limits.  Trachea midline.  Unremarkable osseous structures.    < from: TTE Limited W or WO Ultrasound Enhancing Agent (23 @ 15:24) >   1. Left ventricular cavity is small. Left ventricular systolic function is low normal. There are no regional wall motion abnormalities seen.        ASSESSMENT:  (1)Renal - nonproteinuric CKD3a - likely due to hypertension/microvascular disease  (2)BARRY - prerenally mediated - quite mild  (3)Cardiac - NSTEMI - indicated for cath. Cardiac benefit outweighs renal risk    RECOMMEND:  (1)Can continue Entresto as ordered  (2)No renal objection to cardiac cath cath today - would give NS 100cc/h x 5h starting at least 1 hour prior to cath to minimize DENNIS risk    Thank you for involving [facility] in this patient's care.    With warm regards,    Markell Walton MD   [facility]  Office: [phone]  Cell: [phone]               HPI: Mr. Velázquez is a 68 year-old man with history of multiple medical issues including type 2 diabetes mellitus, atrial fibrillation, coronary artery disease, and congestive heart failure with preserved EF. He presented 23 to the [facility] ER with acute left-sided chest pain which occurred while driving, and without radiation nor other associated symptoms. He ruled in for MI by troponins (peak 282; normal range 0-51). He was placed on a heparin gtt in the ER for acute coronary syndrome.     Mr. Velázquez was noted on admission to have a creatinine of 1.71mg/dL; it is 1.86mg/dL today and was as high as 1.95mg/dL yesterday. In light of the azotemia, a renal consultation was requested. Mr. Velázquez follows with PCP Dr. Noa Carl from [facility]. He states that he was set to see her tomorrow and that he was planned for a renal ultrasound at the visit. He denies notable urinary changes of late. He denies history of nephrolithiasis or prostate disease. He did have BARRY back in 2023 and was seen by me and my partners during the admission back then.  His chest pain resolved with administration of nitroglycerin in the ER on Friday and has not returned since.      PAST MEDICAL & SURGICAL HISTORY:  HTN  HLD  DM2  CAD - 6 stents  AFib  CHF  Carotid stenosis - left CEA    Allergies  No Known Allergies    SOCIAL HISTORY:  Former smoker    FAMILY HISTORY:  Family history of heart disease -father  age 71  FH: atrial fibrillation -sister    REVIEW OF SYSTEMS:  CONSTITUTIONAL: No weakness, fevers or chills  EYES/ENT: No visual changes;  No vertigo or throat pain   NECK: No pain or stiffness  RESPIRATORY: No cough, wheezing, hemoptysis; No shortness of breath  CARDIOVASCULAR: (+)chest pain  GASTROINTESTINAL: No abdominal or epigastric pain. No nausea, vomiting, or hematemesis; No diarrhea or constipation. No melena or hematochezia.  GENITOURINARY: No dysuria, frequency or hematuria  NEUROLOGICAL: No numbness or weakness  SKIN: No itching, burning, rashes, or lesions   All other review of systems is negative unless indicated above.    VITAL:  T(C): , Max: 36.8 (23 @ 04:35)  T(F): , Max: 98.3 (23 @ 04:35)  HR: 77 (23 @ 04:35)  BP: 116/74 (23 @ 04:35)  RR: 18 (23 @ 04:35)  SpO2: 98% (23 @ 04:35)    PHYSICAL EXAM:  Constitutional: NAD, Alert  HEENT: NCAT, MMM  Neck: Supple, No JVD  Respiratory: CTA-b/l  Cardiovascular: RRR s1s2, no m/r/g  Gastrointestinal: BS+, soft, NT/ND  Extremities: No peripheral edema b/l  Neurological: no focal deficits; strength grossly intact  Back: no CVAT b/l  Skin: No rashes, no nevi    LABS:                        15.9   8.42  )-----------( 183      ( 2023 05:38 )             46.7     Na(140)/K(4.2)/Cl(107)/HCO3(19)/BUN(37)/Cr(1.86)Glu(94)/Ca(9.2)/Mg(2.2)/PO4(3.4)     @ 05:37  Na(136)/K(4.9)/Cl(103)/HCO3(19)/BUN(41)/Cr(1.95)Glu(134)/Ca(9.9)/Mg(2.2)/PO4(--)     @ 01:05  Na(138)/K(4.6)/Cl(105)/HCO3(18)/BUN(31)/Cr(1.74)Glu(61)/Ca(9.6)/Mg(--)/PO4(--)     @ 06:10  Na(140)/K(4.5)/Cl(109)/HCO3(21)/BUN(23)/Cr(1.52)Glu(114)/Ca(9.3)/Mg(2.3)/PO4(--)     @ 05:27  Na(140)/K(4.3)/Cl(102)/HCO3(23)/BUN(24)/Cr(1.71)Glu(236)/Ca(10.0)/Mg(2.4)/PO4(--)     @ 20:09    Trop 26==>160==>282==>203      (6/3/23) - BUN 28, Cr 1.43  (23) - UA - trace protein, 2wbc, 1rbc; Upr 170mg/L, Up/c 0.2g/g    IMAGING:  < from: CT Angio Abdomen and Pelvis w/ IV Cont (23 @ 22:56) >  No evidence of aortic dissection. No central pulmonary embolism.  Nonspecific groundglass focus in the right upper lobe may be followed up   with chest CT in 6 weeks.  No acute osseous abnormality.  No acute findings in the abdomen or pelvis.  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 Poststenotic dilatation.    < from: Xray Chest 1 View- PORTABLE-Urgent (23 @ 21:11) >  Clear lungs. No pleural effusions or pneumothorax.  Cardiac and mediastinal silhouettes within normal limits.  Trachea midline.  Unremarkable osseous structures.    < from: TTE Limited W or WO Ultrasound Enhancing Agent (23 @ 15:24) >   1. Left ventricular cavity is small. Left ventricular systolic function is low normal. There are no regional wall motion abnormalities seen.        ASSESSMENT:  (1)Renal - nonproteinuric CKD3a - likely due to hypertension/microvascular disease  (2)BARRY - prerenally mediated - quite mild  (3)Cardiac - NSTEMI - indicated for cath. Cardiac benefit outweighs renal risk    RECOMMEND:  (1)Can continue Entresto as ordered  (2)No renal objection to cardiac cath cath today - periprocedural IVF as ordered to minimize DENNIS risk  (3)BMP daily while admitted    Thank you for involving [facility] in this patient's care.    With warm regards,    Markell Walton MD   [facility]  Office: [phone]  Cell: [phone]

## 2023-11-27 NOTE — DIETITIAN INITIAL EVALUATION ADULT - PERTINENT MEDS FT
MEDICATIONS  (STANDING):  atorvastatin 80 milliGRAM(s) Oral at bedtime  chlorhexidine 2% Cloths 1 Application(s) Topical daily  clopidogrel Tablet 75 milliGRAM(s) Oral daily  dextrose 5%. 1000 milliLiter(s) (100 mL/Hr) IV Continuous <Continuous>  dextrose 5%. 1000 milliLiter(s) (50 mL/Hr) IV Continuous <Continuous>  dextrose 50% Injectable 12.5 Gram(s) IV Push once  dextrose 50% Injectable 25 Gram(s) IV Push once  dextrose 50% Injectable 25 Gram(s) IV Push once  glucagon  Injectable 1 milliGRAM(s) IntraMuscular once  heparin  Infusion.  Unit(s)/Hr (11 mL/Hr) IV Continuous <Continuous>  insulin glargine Injectable (LANTUS) 20 Unit(s) SubCutaneous at bedtime  insulin lispro (ADMELOG) corrective regimen sliding scale   SubCutaneous three times a day before meals  insulin lispro (ADMELOG) corrective regimen sliding scale   SubCutaneous at bedtime  insulin lispro Injectable (ADMELOG) 5 Unit(s) SubCutaneous three times a day before meals  metoprolol tartrate 50 milliGRAM(s) Oral two times a day  sacubitril 49 mG/valsartan 51 mG 1 Tablet(s) Oral two times a day  sodium chloride 0.65% Nasal 1 Spray(s) Both Nostrils three times a day  sodium chloride 0.9%. 1000 milliLiter(s) (40 mL/Hr) IV Continuous <Continuous>  sodium chloride 0.9%. 500 milliLiter(s) (50 mL/Hr) IV Continuous <Continuous>    MEDICATIONS  (PRN):  acetaminophen     Tablet .. 650 milliGRAM(s) Oral every 6 hours PRN Temp greater or equal to 38C (100.4F), Mild Pain (1 - 3)  aluminum hydroxide/magnesium hydroxide/simethicone Suspension 30 milliLiter(s) Oral every 4 hours PRN Dyspepsia  dextrose Oral Gel 15 Gram(s) Oral once PRN Blood Glucose LESS THAN 70 milliGRAM(s)/deciliter  heparin   Injectable 5000 Unit(s) IV Push every 6 hours PRN For aPTT less than 40  heparin   Injectable 2500 Unit(s) IV Push every 6 hours PRN For aPTT between 40 - 57  melatonin 3 milliGRAM(s) Oral at bedtime PRN Insomnia  ondansetron Injectable 4 milliGRAM(s) IV Push every 8 hours PRN Nausea and/or Vomiting

## 2023-11-27 NOTE — DIETITIAN INITIAL EVALUATION ADULT - OTHER INFO
GI:   - Pt reports no N/V; ordered for odansetron. No diarrhea or constipation reported; per chart, last BM not documented. Not ordered for bowel regimen.   - Ordered for Aluminum Hydroxide; Magnesium Hydroxide; Simethicone Suspension.     Cardiac:  - P/w NSTEMI; Hx of CAD multiple PCIs; plan for cath today (11/27).   - Chronic systolic CHF.     Endo:  - Hx of T2DM. POCTs x 24 hrs: 90 - 148 mg/dL WNL. Ordered for in-house insulin regimen: Lantus 20 units, ADMELOG 5 units 3x/day, ADMELOG SSI. RD will continue to monitor blood glucose levels prn.     Renal:  - BARRY on CKD Stage 3 possible cardio-renal component. IVF: NS @ 40 mL/hr. Na, K+, Mg, Phos WNL as of today (11/27). RD will continue to monitor electrolytes prn.    Wt Hx:  - Pt reports UBW of 67.3 kg; endorses unintentional wt loss x last few months. Per chart, dosing wt of 63.5 kg (11/24).   - Wt hx in kg ([place] HIE) as follows: 63.5 (7/30), 60.3 (5/31), 68 (5/4), 67.1 (3/10), 65.3 (2-28-22), 68 (9/5/21). Noted hx of weight fluctuations likely in setting of CHF - fluid retention may skew true current wt and mask wt loss. Overall suspected wt loss of 5.4% x 9 mos (not clinically significant, based on wts from 3/10 and 11/24). RD will continue to monitor weight trends as available/able.   - IBW: 64.5 kg (based on ht of 66 in)

## 2023-11-27 NOTE — DIETITIAN INITIAL EVALUATION ADULT - PROBLEM SELECTOR PLAN 4
- Cr  1.71, (baseline ~ 1.4 - 1.6), possible cardio-renal component   - Judicious IVF given CHF hx  - Trend labs, monitor renal function   - Avoid nephrotoxic meds

## 2023-11-27 NOTE — DIETITIAN INITIAL EVALUATION ADULT - PROBLEM SELECTOR PLAN 1
P/w left CP similar to prior episode where he required stent   - CP now resolved, hemodynamically stable  - S/P ASA 162mg in ED   - EKG: Afib  - Trop: 26 > 160 --Trend till peak   - Tele monitoring  - c/w Clopidogrel  75mg qd   - C/w Xarelto (home med) for AC    - c/w atorvastatin 80mg daily  - check A1c, lipids  - TTE  (ordered)  - Cardio consult to be called in AM   - NPO at midnight for possible cath   - if pt with new or worsening chest pain, stat trop/ekg, call cardiology

## 2023-11-27 NOTE — DIETITIAN INITIAL EVALUATION ADULT - ENERGY INTAKE
- Endorses good appetite/PO intake in-house; consuming >75% of meals. Pt receptive to having Glucerna Protein Shake (per serving provides 10 g PRO, 220 brandin) 1x/day to optimize protein intake.  Adequate (%)

## 2023-11-27 NOTE — DIETITIAN INITIAL EVALUATION ADULT - ORAL INTAKE PTA/DIET HISTORY
Pt primarily prepares meals at home; endorses good appetite/PO intake PTA; consuming ~3 meals/day. Pt is familiar with nutrition therapy for T2DM and CHF; limits sugar sweetened beverages and sweets; reads nutrition labels for sodium and carbohydrates; avoids adding excess salt to meals; primarily consumes lean-protein sources; limits processed foods. Reports micronutrient supplementation: multivitamin, vitamin D3, zinc. Endorses he has tried Ensure Plus High Protein (Provides 20g, 350 Ethan per serving) before, but it had caused a spike in his blood glucose levels; reports having Atkins Protein Bar (10 g PRO, 3 g net CHO) ~1-2x/wk. No known food allergies/intolerances reported. No chewing/swallowing difficulties reported at this time.

## 2023-11-27 NOTE — DIETITIAN INITIAL EVALUATION ADULT - EDUCATION DIETARY MODIFICATIONS
Provided education on Nutrition Therapy for Type 2 Diabetes; Emphasized the importance of utilizing portion control; educated on reading a nutrition label (Na, Added Sugar). Emphasis on the importance of pairing carbohydrates with protein and healthy fats for glycemic control, and consuming sufficient dietary fiber. Discussed the importance of measuring blood sugar levels before/after meals; recommended documenting average blood glucose levels. Educated on CHF Nutrition Therapy. Discussed Na restriction, foods high in Na to avoid, tips for limiting Na in your diet. Discussed Na intake in relation to fluid retention, edema and Wt gain. Recommended consuming small, frequent nutrient-dense meals to optimize PO intake. Pt verbalized understanding and pt made aware RD remains available for diet education review./teach back/(2) meets goals/outcomes/verbalization

## 2023-11-27 NOTE — DIETITIAN INITIAL EVALUATION ADULT - PROBLEM SELECTOR PLAN 6
- Clinically euvolemic   - EKG: Afib   - Trop:  26 > 160     BNP: 4132  - CXR: clear     - I&O, daily wt   - Telemetry   - C/w home meds

## 2023-11-27 NOTE — DIETITIAN INITIAL EVALUATION ADULT - NSFNSADHERENCEPTAFT_GEN_A_CORE
- Hx of T2DM; HgbA1C hx as follows: 6.6% (11/25), 8.2% (5/4/23), 8.8% (2/27/22) - reflects gradual improvement in glycemic control. Endorses checking blood glucose levels 1x/day before bedtime with average readings ~200 mg/dL. Takes [place] 1x/day.

## 2023-11-27 NOTE — DIETITIAN INITIAL EVALUATION ADULT - OTHER CALCULATIONS
Used current dosing wt of 63.5 kg (11/24) for caloric, protein needs in consideration of chronic CHF, BARRY on CKD Stage 3.   Defer fluid needs to team secondary to CHF.

## 2023-11-27 NOTE — DIETITIAN INITIAL EVALUATION ADULT - PROBLEM SELECTOR PROBLEM 5
Peripheral Block      Patient location during procedure: pre-op  Reason for block: at surgeon's request and post-op pain management  Performed by  Anesthesiologist: Armand Jimenez MD  Preanesthetic Checklist  Completed: patient identified, IV checked, site marked, risks and benefits discussed, surgical consent, monitors and equipment checked, pre-op evaluation and timeout performed  Prep:  Pt Position: supine  Prep: ChloraPrep  Patient monitoring: blood pressure monitoring, continuous pulse oximetry and EKG  Procedure    Sedation: yes  Performed under: local infiltration  Guidance:ultrasound guided    ULTRASOUND INTERPRETATION. Using ultrasound guidance a 20 G gauge needle was placed in close proximity to the nerve, at which point, under ultrasound guidance anesthetic was injected in the area of the nerve and spread of the anesthesia was seen on ultrasound in close proximity thereto.  There were no abnormalities seen on ultrasound; a digital image was taken; and the patient tolerated the procedure with no complications.   Laterality:left  Block Type:interscalene  Injection Technique:single-shot  Needle Type:echogenic  Resistance on Injection: none    Medications Used: dexamethasone (DECADRON) injection, 4 mg  ropivacaine (NAROPIN) 0.5 % injection, 20 mL      Post Assessment  Injection Assessment: negative aspiration for heme, no paresthesia on injection and incremental injection  Patient Tolerance:comfortable throughout block  Complications:no             DM2 (diabetes mellitus, type 2)

## 2023-11-27 NOTE — DIETITIAN INITIAL EVALUATION ADULT - ADD RECOMMEND
1) Continue with DASH/TLC, Consistent Carbohydrate Diet; Defer texture/consistency to Speech Language Pathologist prn.   2) Add oral nutrition supplementation: Glucerna Protein Shake (per serving provides 10 g PRO, 220 brandin) 1x/day to optimize protein intake.   3) Encourage small, frequent nutrient-dense meals; obtain food preferences to optimize PO intake.   4) Consider adding Nephro-Bill for micronutrient coverage, pending no medical contraindications.   5) RD will continue to monitor PO intake, GI tolerance, weight trends, skin integrity, BMs, labs/electrolytes prn.   RD remains available upon request.

## 2023-11-27 NOTE — PROGRESS NOTE ADULT - SUBJECTIVE AND OBJECTIVE BOX
Date of service: 23 @ 11:13      Patient is a 68y old  Male who presents with a chief complaint of CP (2023 11:01)                                                               INTERVAL HPI/OVERNIGHT EVENTS:    REVIEW OF SYSTEMS:     CONSTITUTIONAL: No weakness, fevers or chills  EYES/ENT: No visual changes , no ear ache   NECK: No pain or stiffness  RESPIRATORY: No cough, wheezing,  No shortness of breath  CARDIOVASCULAR: No chest pain or palpitations  GASTROINTESTINAL: No abdominal pain  . No nausea, vomiting, or hematemesis; No diarrhea or constipation. No melena or hematochezia.  GENITOURINARY: No dysuria, frequency or hematuria  NEUROLOGICAL: No numbness or weakness  SKIN: No itching, burning, rashes, or lesions                                                                                                                                                                                                                                                                                 Medications:  MEDICATIONS  (STANDING):  atorvastatin 80 milliGRAM(s) Oral at bedtime  chlorhexidine 2% Cloths 1 Application(s) Topical daily  clopidogrel Tablet 75 milliGRAM(s) Oral daily  dextrose 5%. 1000 milliLiter(s) (100 mL/Hr) IV Continuous <Continuous>  dextrose 5%. 1000 milliLiter(s) (50 mL/Hr) IV Continuous <Continuous>  dextrose 50% Injectable 12.5 Gram(s) IV Push once  dextrose 50% Injectable 25 Gram(s) IV Push once  dextrose 50% Injectable 25 Gram(s) IV Push once  glucagon  Injectable 1 milliGRAM(s) IntraMuscular once  heparin  Infusion.  Unit(s)/Hr (11 mL/Hr) IV Continuous <Continuous>  insulin glargine Injectable (LANTUS) 20 Unit(s) SubCutaneous at bedtime  insulin lispro (ADMELOG) corrective regimen sliding scale   SubCutaneous three times a day before meals  insulin lispro (ADMELOG) corrective regimen sliding scale   SubCutaneous at bedtime  insulin lispro Injectable (ADMELOG) 5 Unit(s) SubCutaneous three times a day before meals  metoprolol tartrate 50 milliGRAM(s) Oral two times a day  sacubitril 49 mG/valsartan 51 mG 1 Tablet(s) Oral two times a day  sodium chloride 0.65% Nasal 1 Spray(s) Both Nostrils three times a day  sodium chloride 0.9%. 1000 milliLiter(s) (40 mL/Hr) IV Continuous <Continuous>  sodium chloride 0.9%. 500 milliLiter(s) (50 mL/Hr) IV Continuous <Continuous>    MEDICATIONS  (PRN):  acetaminophen     Tablet .. 650 milliGRAM(s) Oral every 6 hours PRN Temp greater or equal to 38C (100.4F), Mild Pain (1 - 3)  aluminum hydroxide/magnesium hydroxide/simethicone Suspension 30 milliLiter(s) Oral every 4 hours PRN Dyspepsia  dextrose Oral Gel 15 Gram(s) Oral once PRN Blood Glucose LESS THAN 70 milliGRAM(s)/deciliter  heparin   Injectable 2500 Unit(s) IV Push every 6 hours PRN For aPTT between 40 - 57  heparin   Injectable 5000 Unit(s) IV Push every 6 hours PRN For aPTT less than 40  melatonin 3 milliGRAM(s) Oral at bedtime PRN Insomnia  ondansetron Injectable 4 milliGRAM(s) IV Push every 8 hours PRN Nausea and/or Vomiting       Allergies    No Known Allergies    Intolerances      Vital Signs Last 24 Hrs  T(C): 36.8 (2023 04:35), Max: 36.8 (2023 04:35)  T(F): 98.3 (2023 04:35), Max: 98.3 (2023 04:35)  HR: 77 (2023 04:35) (74 - 78)  BP: 116/74 (2023 04:35) (110/66 - 116/74)  BP(mean): --  RR: 18 (2023 04:35) (18 - 18)  SpO2: 98% (2023 04:35) (97% - 98%)    Parameters below as of 2023 04:35  Patient On (Oxygen Delivery Method): room air      CAPILLARY BLOOD GLUCOSE      POCT Blood Glucose.: 92 mg/dL (2023 07:43)  POCT Blood Glucose.: 148 mg/dL (2023 21:57)  POCT Blood Glucose.: 90 mg/dL (2023 21:03)  POCT Blood Glucose.: 92 mg/dL (2023 17:12)  POCT Blood Glucose.: 82 mg/dL (2023 14:40)       @ 07:01  -   @ 07:00  --------------------------------------------------------  IN: 480 mL / OUT: 900 mL / NET: -420 mL      Physical Exam:    Daily     Daily Weight in k (2023 08:17)  General:  Well appearing, NAD, not cachetic  HEENT:  Nonicteric, PERRLA  CV:  RRR, S1S2   Lungs:  CTA B/L, no wheezes, rales, rhonchi  Abdomen:  Soft, non-tender, no distended, positive BS  Extremities: no edema   Neuro:  AAOx3, non-focal, grossly intact                                                                                                                                                                                                                                                                                                LABS:                               15.9   8.42  )-----------( 183      ( 2023 05:38 )             46.7                          140  |  107  |  37<H>  ----------------------------<  94  4.2   |  19<L>  |  1.86<H>    Ca    9.2      2023 05:37  Phos  3.4       Mg     2.2                              RADIOLOGY & ADDITIONAL TESTS         I personally reviewed: [  ]EKG   [  ]CXR    [  ] CT      A/P:         Discussed with :     Derek consultants' Notes   Time spent :

## 2023-11-27 NOTE — CHART NOTE - NSCHARTNOTEFT_GEN_A_CORE
POBA to Cx and pPOBA to pLAD.  #6 Yi sheath removed from RFA manual pressure held good hemostasis- no hematoma- Pt tolerated well   radial removed by RN without issue   monitor sites overnight   resume Heparin gtt in 6 hours no bolus (hx of Afib) POBA to Cx and pPOBA to pLAD.  #6 [place] sheath removed from RFA manual pressure held good hemostasis- no hematoma- Pt tolerated well   radial band removed by RN without issue   monitor sites overnight   resume Heparin gtt in 6 hours no bolus (hx of Afib)  continue Plavix  Xarelto to resume 11/28 alesia  drop ASA in one week   Plan d/w Dr. Dominguez

## 2023-11-27 NOTE — PROGRESS NOTE ADULT - ASSESSMENT
68 M PMH PAF, CAD multiple PCIs w/ recent TITO to ostial LM ( 6/2/23), CKD3, IDDM2, CHF,  p/w left CP that began while driving around 5pm yesterday  First Trop - 26 at 20:09. Second Trop 160 Third 282 Fourth 203 peak Trop 11/25 at 5:27AM  LDL 44 Pain free at present   - Cont Plavix   - Agree with Heparin gtt - given Unstable Angina / NSTEMI presentation   - if pain recurs will need urgent cath eval  - Cont BB/ARNI  - check Echo  - cont Tele  - For cardiac cath today to evaluate NSTEMI.  R/B/A explained to patient including risk of death, stroke, infection, MI ,bleeding.   - Further treatement based on results of cath.

## 2023-11-27 NOTE — DIETITIAN INITIAL EVALUATION ADULT - REASON INDICATOR FOR ASSESSMENT
RD Consult Indicated for: CHF  Source: Pt, RN, Electronic Medical Record   Chart reviewed, events noted.

## 2023-11-27 NOTE — DIETITIAN INITIAL EVALUATION ADULT - PERTINENT LABORATORY DATA
11-27    140  |  107  |  37<H>  ----------------------------<  94  4.2   |  19<L>  |  1.86<H>    Ca    9.2      27 Nov 2023 05:37  Phos  3.4     11-27  Mg     2.2     11-27    POCT Blood Glucose.: 143 mg/dL (11-27-23 @ 11:26)  A1C with Estimated Average Glucose Result: 6.6 % (11-25-23 @ 05:27)  A1C with Estimated Average Glucose Result: 8.4 % (05-05-23 @ 07:28)  A1C with Estimated Average Glucose Result: 8.2 % (05-04-23 @ 12:15)

## 2023-11-27 NOTE — DIETITIAN INITIAL EVALUATION ADULT - NSFNSGIIOFT_GEN_A_CORE
I&O's Detail    26 Nov 2023 07:01  -  27 Nov 2023 07:00  --------------------------------------------------------  IN:    Oral Fluid: 480 mL  Total IN: 480 mL    OUT:    Voided (mL): 900 mL  Total OUT: 900 mL    Total NET: -420 mL

## 2023-11-27 NOTE — DIETITIAN INITIAL EVALUATION ADULT - ETIOLOGY
inability to meet estimated increased nutrient needs secondary to chronic CHF lack of exposure/practice with CHF/DM medical nutrition therapy

## 2023-11-27 NOTE — DIETITIAN INITIAL EVALUATION ADULT - ENTER TO (CAL/KG)
Pt to be discharged this AM. PIV removed and Pt given discharge instructions. Meds sent to pharmacy downstairs. Pt awaiting PharmD to bring blue card and review medications and states he will ambulate off unit when able.  
30

## 2023-11-27 NOTE — DIETITIAN INITIAL EVALUATION ADULT - LITERATURE/VIDEOS GIVEN
Heart Failure Nutrition Therapy  Heart Healthy Label Reading Tips  Heart Healthy Shopping Tips  Sodium (salt) Content of Foods

## 2023-11-28 LAB
ANION GAP SERPL CALC-SCNC: 11 MMOL/L — SIGNIFICANT CHANGE UP (ref 5–17)
ANION GAP SERPL CALC-SCNC: 11 MMOL/L — SIGNIFICANT CHANGE UP (ref 5–17)
APTT BLD: 68.3 SEC — HIGH (ref 24.5–35.6)
APTT BLD: 68.3 SEC — HIGH (ref 24.5–35.6)
BUN SERPL-MCNC: 34 MG/DL — HIGH (ref 7–23)
BUN SERPL-MCNC: 34 MG/DL — HIGH (ref 7–23)
CALCIUM SERPL-MCNC: 9.8 MG/DL — SIGNIFICANT CHANGE UP (ref 8.4–10.5)
CALCIUM SERPL-MCNC: 9.8 MG/DL — SIGNIFICANT CHANGE UP (ref 8.4–10.5)
CHLORIDE SERPL-SCNC: 107 MMOL/L — SIGNIFICANT CHANGE UP (ref 96–108)
CHLORIDE SERPL-SCNC: 107 MMOL/L — SIGNIFICANT CHANGE UP (ref 96–108)
CO2 SERPL-SCNC: 21 MMOL/L — LOW (ref 22–31)
CO2 SERPL-SCNC: 21 MMOL/L — LOW (ref 22–31)
CREAT SERPL-MCNC: 1.89 MG/DL — HIGH (ref 0.5–1.3)
CREAT SERPL-MCNC: 1.89 MG/DL — HIGH (ref 0.5–1.3)
EGFR: 39 ML/MIN/1.73M2 — LOW
EGFR: 39 ML/MIN/1.73M2 — LOW
GLUCOSE BLDC GLUCOMTR-MCNC: 101 MG/DL — HIGH (ref 70–99)
GLUCOSE BLDC GLUCOMTR-MCNC: 101 MG/DL — HIGH (ref 70–99)
GLUCOSE BLDC GLUCOMTR-MCNC: 135 MG/DL — HIGH (ref 70–99)
GLUCOSE BLDC GLUCOMTR-MCNC: 135 MG/DL — HIGH (ref 70–99)
GLUCOSE BLDC GLUCOMTR-MCNC: 138 MG/DL — HIGH (ref 70–99)
GLUCOSE BLDC GLUCOMTR-MCNC: 138 MG/DL — HIGH (ref 70–99)
GLUCOSE BLDC GLUCOMTR-MCNC: 151 MG/DL — HIGH (ref 70–99)
GLUCOSE BLDC GLUCOMTR-MCNC: 151 MG/DL — HIGH (ref 70–99)
GLUCOSE SERPL-MCNC: 126 MG/DL — HIGH (ref 70–99)
GLUCOSE SERPL-MCNC: 126 MG/DL — HIGH (ref 70–99)
HCT VFR BLD CALC: 44.6 % — SIGNIFICANT CHANGE UP (ref 39–50)
HCT VFR BLD CALC: 44.6 % — SIGNIFICANT CHANGE UP (ref 39–50)
HGB BLD-MCNC: 15.7 G/DL — SIGNIFICANT CHANGE UP (ref 13–17)
HGB BLD-MCNC: 15.7 G/DL — SIGNIFICANT CHANGE UP (ref 13–17)
MAGNESIUM SERPL-MCNC: 2.2 MG/DL — SIGNIFICANT CHANGE UP (ref 1.6–2.6)
MAGNESIUM SERPL-MCNC: 2.2 MG/DL — SIGNIFICANT CHANGE UP (ref 1.6–2.6)
MCHC RBC-ENTMCNC: 32 PG — SIGNIFICANT CHANGE UP (ref 27–34)
MCHC RBC-ENTMCNC: 32 PG — SIGNIFICANT CHANGE UP (ref 27–34)
MCHC RBC-ENTMCNC: 35.2 GM/DL — SIGNIFICANT CHANGE UP (ref 32–36)
MCHC RBC-ENTMCNC: 35.2 GM/DL — SIGNIFICANT CHANGE UP (ref 32–36)
MCV RBC AUTO: 91 FL — SIGNIFICANT CHANGE UP (ref 80–100)
MCV RBC AUTO: 91 FL — SIGNIFICANT CHANGE UP (ref 80–100)
NRBC # BLD: 0 /100 WBCS — SIGNIFICANT CHANGE UP (ref 0–0)
NRBC # BLD: 0 /100 WBCS — SIGNIFICANT CHANGE UP (ref 0–0)
PLATELET # BLD AUTO: 182 K/UL — SIGNIFICANT CHANGE UP (ref 150–400)
PLATELET # BLD AUTO: 182 K/UL — SIGNIFICANT CHANGE UP (ref 150–400)
POTASSIUM SERPL-MCNC: 4.4 MMOL/L — SIGNIFICANT CHANGE UP (ref 3.5–5.3)
POTASSIUM SERPL-MCNC: 4.4 MMOL/L — SIGNIFICANT CHANGE UP (ref 3.5–5.3)
POTASSIUM SERPL-SCNC: 4.4 MMOL/L — SIGNIFICANT CHANGE UP (ref 3.5–5.3)
POTASSIUM SERPL-SCNC: 4.4 MMOL/L — SIGNIFICANT CHANGE UP (ref 3.5–5.3)
RBC # BLD: 4.9 M/UL — SIGNIFICANT CHANGE UP (ref 4.2–5.8)
RBC # BLD: 4.9 M/UL — SIGNIFICANT CHANGE UP (ref 4.2–5.8)
RBC # FLD: 13.2 % — SIGNIFICANT CHANGE UP (ref 10.3–14.5)
RBC # FLD: 13.2 % — SIGNIFICANT CHANGE UP (ref 10.3–14.5)
SODIUM SERPL-SCNC: 139 MMOL/L — SIGNIFICANT CHANGE UP (ref 135–145)
SODIUM SERPL-SCNC: 139 MMOL/L — SIGNIFICANT CHANGE UP (ref 135–145)
WBC # BLD: 10.25 K/UL — SIGNIFICANT CHANGE UP (ref 3.8–10.5)
WBC # BLD: 10.25 K/UL — SIGNIFICANT CHANGE UP (ref 3.8–10.5)
WBC # FLD AUTO: 10.25 K/UL — SIGNIFICANT CHANGE UP (ref 3.8–10.5)
WBC # FLD AUTO: 10.25 K/UL — SIGNIFICANT CHANGE UP (ref 3.8–10.5)

## 2023-11-28 PROCEDURE — 99232 SBSQ HOSP IP/OBS MODERATE 35: CPT

## 2023-11-28 PROCEDURE — 99152 MOD SED SAME PHYS/QHP 5/>YRS: CPT

## 2023-11-28 PROCEDURE — 92978 ENDOLUMINL IVUS OCT C 1ST: CPT | Mod: 26,LM

## 2023-11-28 PROCEDURE — 92928 PRQ TCAT PLMT NTRAC ST 1 LES: CPT | Mod: LM

## 2023-11-28 RX ORDER — RIVAROXABAN 15 MG-20MG
15 KIT ORAL
Refills: 0 | Status: DISCONTINUED | OUTPATIENT
Start: 2023-11-28 | End: 2023-11-29

## 2023-11-28 RX ADMIN — HEPARIN SODIUM 700 UNIT(S)/HR: 5000 INJECTION INTRAVENOUS; SUBCUTANEOUS at 01:21

## 2023-11-28 RX ADMIN — INSULIN GLARGINE 20 UNIT(S): 100 INJECTION, SOLUTION SUBCUTANEOUS at 21:53

## 2023-11-28 RX ADMIN — Medication 1 SPRAY(S): at 05:16

## 2023-11-28 RX ADMIN — SACUBITRIL AND VALSARTAN 1 TABLET(S): 24; 26 TABLET, FILM COATED ORAL at 17:53

## 2023-11-28 RX ADMIN — Medication 1 SPRAY(S): at 21:48

## 2023-11-28 RX ADMIN — Medication 81 MILLIGRAM(S): at 12:18

## 2023-11-28 RX ADMIN — Medication 5 UNIT(S): at 07:30

## 2023-11-28 RX ADMIN — Medication 1 TABLET(S): at 12:18

## 2023-11-28 RX ADMIN — Medication 5 UNIT(S): at 12:18

## 2023-11-28 RX ADMIN — Medication 5 UNIT(S): at 17:21

## 2023-11-28 RX ADMIN — Medication 50 MILLIGRAM(S): at 17:53

## 2023-11-28 RX ADMIN — RIVAROXABAN 15 MILLIGRAM(S): KIT at 16:54

## 2023-11-28 RX ADMIN — Medication 50 MILLIGRAM(S): at 05:16

## 2023-11-28 RX ADMIN — Medication 1 SPRAY(S): at 16:53

## 2023-11-28 RX ADMIN — ATORVASTATIN CALCIUM 80 MILLIGRAM(S): 80 TABLET, FILM COATED ORAL at 21:48

## 2023-11-28 RX ADMIN — CLOPIDOGREL BISULFATE 75 MILLIGRAM(S): 75 TABLET, FILM COATED ORAL at 12:18

## 2023-11-28 RX ADMIN — HEPARIN SODIUM 700 UNIT(S)/HR: 5000 INJECTION INTRAVENOUS; SUBCUTANEOUS at 08:10

## 2023-11-28 RX ADMIN — SACUBITRIL AND VALSARTAN 1 TABLET(S): 24; 26 TABLET, FILM COATED ORAL at 05:16

## 2023-11-28 RX ADMIN — CHLORHEXIDINE GLUCONATE 1 APPLICATION(S): 213 SOLUTION TOPICAL at 16:53

## 2023-11-28 RX ADMIN — Medication 1: at 12:18

## 2023-11-28 NOTE — PROGRESS NOTE ADULT - SUBJECTIVE AND OBJECTIVE BOX
Interventional Cardiology Post Cath Progress Note:                Subjective:   Patient feels well- no current complaints- Denies chest pain, shortness of breath. Denies pain, numbness, tingling or swelling around R groin or R wrist access site    Tele 24hrs: Afib @ 105bpm      MEDICATIONS  (STANDING):  aspirin  chewable 81 milliGRAM(s) Oral daily  atorvastatin 80 milliGRAM(s) Oral at bedtime  chlorhexidine 2% Cloths 1 Application(s) Topical daily  clopidogrel Tablet 75 milliGRAM(s) Oral daily  dextrose 5%. 1000 milliLiter(s) (100 mL/Hr) IV Continuous <Continuous>  dextrose 5%. 1000 milliLiter(s) (50 mL/Hr) IV Continuous <Continuous>  dextrose 50% Injectable 25 Gram(s) IV Push once  dextrose 50% Injectable 12.5 Gram(s) IV Push once  dextrose 50% Injectable 25 Gram(s) IV Push once  glucagon  Injectable 1 milliGRAM(s) IntraMuscular once  heparin  Infusion. 700 Unit(s)/Hr (7 mL/Hr) IV Continuous <Continuous>  insulin glargine Injectable (LANTUS) 20 Unit(s) SubCutaneous at bedtime  insulin lispro (ADMELOG) corrective regimen sliding scale   SubCutaneous three times a day before meals  insulin lispro (ADMELOG) corrective regimen sliding scale   SubCutaneous at bedtime  insulin lispro Injectable (ADMELOG) 5 Unit(s) SubCutaneous three times a day before meals  metoprolol tartrate 50 milliGRAM(s) Oral two times a day  Nephro-daniel 1 Tablet(s) Oral daily  sacubitril 49 mG/valsartan 51 mG 1 Tablet(s) Oral two times a day  sodium chloride 0.65% Nasal 1 Spray(s) Both Nostrils three times a day  sodium chloride 0.9%. 1000 milliLiter(s) (40 mL/Hr) IV Continuous <Continuous>  sodium chloride 0.9%. 1000 milliLiter(s) (100 mL/Hr) IV Continuous <Continuous>  sodium chloride 0.9%. 500 milliLiter(s) (50 mL/Hr) IV Continuous <Continuous>    MEDICATIONS  (PRN):  acetaminophen     Tablet .. 650 milliGRAM(s) Oral every 6 hours PRN Temp greater or equal to 38C (100.4F), Mild Pain (1 - 3)  aluminum hydroxide/magnesium hydroxide/simethicone Suspension 30 milliLiter(s) Oral every 4 hours PRN Dyspepsia  dextrose Oral Gel 15 Gram(s) Oral once PRN Blood Glucose LESS THAN 70 milliGRAM(s)/deciliter  heparin   Injectable 5000 Unit(s) IV Push every 6 hours PRN For aPTT less than 40  heparin   Injectable 2500 Unit(s) IV Push every 6 hours PRN For aPTT between 40 - 57  melatonin 3 milliGRAM(s) Oral at bedtime PRN Insomnia  ondansetron Injectable 4 milliGRAM(s) IV Push every 8 hours PRN Nausea and/or Vomiting      Objective:  Vital Signs Last 24 Hrs  T(C): 36.6 (28 Nov 2023 04:29), Max: 36.8 (27 Nov 2023 11:00)  T(F): 97.9 (28 Nov 2023 04:29), Max: 98.2 (27 Nov 2023 11:00)  HR: 91 (28 Nov 2023 04:29) (76 - 99)  BP: 117/75 (28 Nov 2023 04:29) (112/69 - 169/81)  BP(mean): --  RR: 17 (28 Nov 2023 04:29) (16 - 18)  SpO2: 99% (28 Nov 2023 04:29) (96% - 100%)    Parameters below as of 28 Nov 2023 04:29  Patient On (Oxygen Delivery Method): room air        11-27-23 @ 07:01  -  11-28-23 @ 07:00  --------------------------------------------------------  IN: 0 mL / OUT: 700 mL / NET: -700 mL                              15.7   10.25 )-----------( 182      ( 28 Nov 2023 07:06 )             44.6     11-28    139  |  107  |  34<H>  ----------------------------<  126<H>  4.4   |  21<L>  |  1.89<H>    Ca    9.8      28 Nov 2023 07:05  Phos  3.4     11-27  Mg     2.2     11-28      PTT - ( 28 Nov 2023 07:13 )  PTT:68.3 sec  Urinalysis Basic - ( 28 Nov 2023 07:05 )    Color: x / Appearance: x / SG: x / pH: x  Gluc: 126 mg/dL / Ketone: x  / Bili: x / Urobili: x   Blood: x / Protein: x / Nitrite: x   Leuk Esterase: x / RBC: x / WBC x   Sq Epi: x / Non Sq Epi: x / Bacteria: x    Cardiac Catheterization (11.27.23 @ 14:39)   Procedures Performed   Procedures:                 1.    Arterial Access - Right Radial   2.    Diagnostic Coronary Angiography   3.    Ultrasound Guided Access   4.    Arterial Access - Right Femoral   5.    PCI: POBA   6.    PCI: POBA     Indications:               Myocardial infarction without ST elevation  (NSTEMI)  PCI Status:               urgent     Conclusions:   NSTEMI s/p successful POBA of pLAD and pLCx stent restenoses     Recommendations:     DAPT for 12 months. Aggressive risk factor modifications.      TTE Limited W or WO Ultrasound Enhancing Agent (11.26.23 @ 15:24)   FINDINGS:     Left Ventricle:  The left ventricular cavity is small. Left ventricular systolic function is low normal with an ejection fraction visually estimated at 50%. There are no regional wall motion abnormalitiesseen. There is no evidence of a left ventricular thrombus.      Physical Exam:  No apparent distress, alert and oriented times three, appropriate affect  JVD is not elevated, supple  Clear to auscultation with no wheezing, rhonchi or crackles  Irregular rate and rhythm with no murmur, rub or gallop  Soft, non-tender, non-distended, no masses/guarding or rebound tenderness  Right Upper Extremity: Soft, non tender, no bleeding or hematoma, clean/dry/intact- RUE +2 palpable radial pulse  R groin: Soft, non tender, no bleeding or hematoma, clean/dry/intact- RLE/LLE +2 DP pulse, no bruit  No clubbing, cyanosis or edema

## 2023-11-28 NOTE — PROGRESS NOTE ADULT - SUBJECTIVE AND OBJECTIVE BOX
Overnight events noted      VITAL:  T(C): , Max: 36.6 (11-27-23 @ 20:45)  T(F): , Max: 97.9 (11-28-23 @ 04:29)  HR: 93 (11-28-23 @ 11:15)  BP: 119/65 (11-28-23 @ 11:15)  BP(mean): --  RR: 18 (11-28-23 @ 11:15)  SpO2: 97% (11-28-23 @ 11:15)  Wt(kg): --      PHYSICAL EXAM:  Constitutional: NAD, Alert  HEENT: NCAT, MMM  Neck: Supple, No JVD  Respiratory: CTA-b/l  Cardiovascular: RRR s1s2, no m/r/g  Gastrointestinal: BS+, soft, NT/ND  Extremities: No peripheral edema b/l  Neurological: no focal deficits; strength grossly intact  Back: no CVAT b/l  Skin: No rashes, no nevi    LABS:                        15.7   10.25 )-----------( 182      ( 28 Nov 2023 07:06 )             44.6     Na(139)/K(4.4)/Cl(107)/HCO3(21)/BUN(34)/Cr(1.89)Glu(126)/Ca(9.8)/Mg(2.2)/PO4(--)    11-28 @ 07:05  Na(140)/K(4.2)/Cl(107)/HCO3(19)/BUN(37)/Cr(1.86)Glu(94)/Ca(9.2)/Mg(2.2)/PO4(3.4)    11-27 @ 05:37  Na(136)/K(4.9)/Cl(103)/HCO3(19)/BUN(41)/Cr(1.95)Glu(134)/Ca(9.9)/Mg(2.2)/PO4(--)    11-27 @ 01:05  Na(138)/K(4.6)/Cl(105)/HCO3(18)/BUN(31)/Cr(1.74)Glu(61)/Ca(9.6)/Mg(--)/PO4(--)    11-26 @ 06:10      CATH 11/27: POBA to Cx and pPOBA to pLAD.         IMPRESSION: 68M w/ DM2, CKD, AFib, CAD, and HFpEF, 11/26 p/w acute coronary syndrome    (1)Renal - nonproteinuric CKD3a - likely due to hypertension/microvascular disease  (2)BARRY - prerenally mediated - quite mild  (3)Cardiac - NSTEMI - s/p POBA to LCx/LAD 11/27. No evidence of DENNIS to date s/p cath    RECOMMEND:  (1)Can continue Entresto as ordered  (2)No renal objection to discharge, with BMP within 1 week; can f/u at my office in 2-6 weeks            Markell Walton MD  [facility]  Office/on call physician: [phone]  Cell (7a-7p): [phone]       No pain, no sob  Voiding well      VITAL:  T(C): , Max: 36.6 (11-27-23 @ 20:45)  T(F): , Max: 97.9 (11-28-23 @ 04:29)  HR: 93 (11-28-23 @ 11:15)  BP: 119/65 (11-28-23 @ 11:15)  BP(mean): --  RR: 18 (11-28-23 @ 11:15)  SpO2: 97% (11-28-23 @ 11:15)  Wt(kg): --      PHYSICAL EXAM:  Constitutional: NAD, Alert  HEENT: NCAT, MMM  Neck: Supple, No JVD  Respiratory: CTA-b/l  Cardiovascular: RRR s1s2, no m/r/g  Gastrointestinal: BS+, soft, NT/ND  Extremities: No peripheral edema b/l  Neurological: no focal deficits; strength grossly intact  Back: no CVAT b/l  Skin: No rashes, no nevi    LABS:                        15.7   10.25 )-----------( 182      ( 28 Nov 2023 07:06 )             44.6     Na(139)/K(4.4)/Cl(107)/HCO3(21)/BUN(34)/Cr(1.89)Glu(126)/Ca(9.8)/Mg(2.2)/PO4(--)    11-28 @ 07:05  Na(140)/K(4.2)/Cl(107)/HCO3(19)/BUN(37)/Cr(1.86)Glu(94)/Ca(9.2)/Mg(2.2)/PO4(3.4)    11-27 @ 05:37  Na(136)/K(4.9)/Cl(103)/HCO3(19)/BUN(41)/Cr(1.95)Glu(134)/Ca(9.9)/Mg(2.2)/PO4(--)    11-27 @ 01:05  Na(138)/K(4.6)/Cl(105)/HCO3(18)/BUN(31)/Cr(1.74)Glu(61)/Ca(9.6)/Mg(--)/PO4(--)    11-26 @ 06:10      CATH 11/27: POBA to Cx and pPOBA to pLAD.         IMPRESSION: 68M w/ DM2, CKD, AFib, CAD, and HFpEF, 11/26 p/w acute coronary syndrome    (1)Renal - nonproteinuric CKD3a - likely due to hypertension/microvascular disease  (2)BARRY - prerenally mediated - quite mild  (3)Cardiac - NSTEMI - s/p POBA to LCx/LAD 11/27. No evidence of DENNIS to date s/p cath    RECOMMEND:  (1)Can continue Entresto as ordered  (2)No renal objection to discharge, with BMP within 1 week; can f/u at my office in 2-6 weeks            Markell Walton MD  [facility]  Office/on call physician: [phone]  Cell (7a-7p): [phone]

## 2023-11-28 NOTE — PROGRESS NOTE ADULT - SUBJECTIVE AND OBJECTIVE BOX
SUBJECTIVE: Asymptomatic in chair. S/p successful POBA of pLAD and pLCx stent restenosis.  	  MEDICATIONS:  metoprolol tartrate 50 milliGRAM(s) Oral two times a day  sacubitril 49 mG/valsartan 51 mG 1 Tablet(s) Oral two times a day  acetaminophen     Tablet .. 650 milliGRAM(s) Oral every 6 hours PRN  melatonin 3 milliGRAM(s) Oral at bedtime PRN  ondansetron Injectable 4 milliGRAM(s) IV Push every 8 hours PRN  aluminum hydroxide/magnesium hydroxide/simethicone Suspension 30 milliLiter(s) Oral every 4 hours PRN  atorvastatin 80 milliGRAM(s) Oral at bedtime  dextrose 50% Injectable 25 Gram(s) IV Push once  dextrose 50% Injectable 12.5 Gram(s) IV Push once  dextrose 50% Injectable 25 Gram(s) IV Push once  dextrose Oral Gel 15 Gram(s) Oral once PRN  glucagon  Injectable 1 milliGRAM(s) IntraMuscular once  insulin glargine Injectable (LANTUS) 20 Unit(s) SubCutaneous at bedtime  insulin lispro (ADMELOG) corrective regimen sliding scale   SubCutaneous three times a day before meals  insulin lispro (ADMELOG) corrective regimen sliding scale   SubCutaneous at bedtime  insulin lispro Injectable (ADMELOG) 5 Unit(s) SubCutaneous three times a day before meals  aspirin  chewable 81 milliGRAM(s) Oral daily  chlorhexidine 2% Cloths 1 Application(s) Topical daily  clopidogrel Tablet 75 milliGRAM(s) Oral daily  dextrose 5%. 1000 milliLiter(s) IV Continuous <Continuous>  dextrose 5%. 1000 milliLiter(s) IV Continuous <Continuous>  heparin   Injectable 2500 Unit(s) IV Push every 6 hours PRN  heparin   Injectable 5000 Unit(s) IV Push every 6 hours PRN  heparin  Infusion. 700 Unit(s)/Hr IV Continuous <Continuous>  Nephro-adniel 1 Tablet(s) Oral daily  sodium chloride 0.65% Nasal 1 Spray(s) Both Nostrils three times a day  sodium chloride 0.9%. 1000 milliLiter(s) IV Continuous <Continuous>  sodium chloride 0.9%. 1000 milliLiter(s) IV Continuous <Continuous>  sodium chloride 0.9%. 500 milliLiter(s) IV Continuous <Continuous>      REVIEW OF SYSTEMS:    CONSTITUTIONAL: No fever, weight loss, or fatigue  EYES: No eye pain, visual disturbances, or discharge  NECK: No pain or stiffness  RESPIRATORY: No cough, wheezing, chills or hemoptysis; No Shortness of Breath  CARDIOVASCULAR: No chest pain, palpitations, dizziness, or leg swelling  GASTROINTESTINAL: No abdominal or epigastric pain. No nausea, vomiting, or hematemesis; No diarrhea or constipation. No melena or hematochezia.  GENITOURINARY: No dysuria, frequency, hematuria, or incontinence  NEUROLOGICAL: No headaches, memory loss, loss of strength, numbness, or tremors  SKIN: No itching, burning, rashes, or lesions   LYMPH Nodes: No enlarged glands  MUSCULOSKELETAL: No joint pain or swelling; No muscle, back, or extremity pain  All other review of systems are negative.  	    PHYSICAL EXAM:  T(C): 36.6 (11-28-23 @ 04:29), Max: 36.7 (11-27-23 @ 11:47)  HR: 91 (11-28-23 @ 04:29) (78 - 99)  BP: 117/75 (11-28-23 @ 04:29) (112/69 - 169/81)  RR: 17 (11-28-23 @ 04:29) (16 - 18)  SpO2: 99% (11-28-23 @ 04:29) (96% - 100%)  Wt(kg): --  I&O's Summary    27 Nov 2023 07:01  -  28 Nov 2023 07:00  --------------------------------------------------------  IN: 0 mL / OUT: 700 mL / NET: -700 mL    28 Nov 2023 07:01  -  28 Nov 2023 11:14  --------------------------------------------------------  IN: 360 mL / OUT: 550 mL / NET: -190 mL      Height (cm): 167 (11-27 @ 12:49)  Weight (kg): 63.5 (11-27 @ 12:49)  BMI (kg/m2): 22.8 (11-27 @ 12:49)  BSA (m2): 1.71 (11-27 @ 12:49)    PHYSICAL EXAM    Appearance: Normal	  HEENT:   Normal oral mucosa, PERRL, EOMI	  NECK: Soft and supple, No LAD, No JVD  Cardiovascular: Regular Rate and Rhythm, Normal S1 S2, No murmurs, No clicks, gallops or rubs  Respiratory: Lungs clear to auscultation	  Extremities: No clubbing, cyanosis or edema  	    LABS:	 	               15.7   10.25 )-----------( 182      ( 28 Nov 2023 07:06 )             44.6     11-28    139  |  107  |  34<H>  ----------------------------<  126<H>  4.4   |  21<L>  |  1.89<H>    Ca    9.8      28 Nov 2023 07:05  Phos  3.4     11-27  Mg     2.2     11-28

## 2023-11-28 NOTE — PROGRESS NOTE ADULT - ASSESSMENT
#IDDM  #Afib   #CKD  #hfpef  #ASHD s/p multiple PCIs in past, + NSTEMI s/p  POBA of pLAD and pLCx stent restenoses.  As per interventional note from this am, noac + ASA + plavix for one week, then d/c asa, continue  plavix and xarelto. TO restart xarelto tonight.

## 2023-11-28 NOTE — PROGRESS NOTE ADULT - PROBLEM SELECTOR PLAN 6
- Clinically euvolemic : holding lasix for now   - EKG: Afib   - Trop:  26 > 160     BNP: 4132  - CXR: clear     - I&O, daily wt   - Telemetry   - C/w home meds
- Clinically euvolemic   - EKG: Afib   - Trop:  26 > 160     BNP: 4132  - CXR: clear     - I&O, daily wt   - Telemetry   - C/w home meds
- Clinically euvolemic : holding lasix for now   - EKG: Afib   - Trop:  26 > 160     BNP: 4132  - CXR: clear     - I&O, daily wt   - Telemetry   - C/w home meds

## 2023-11-28 NOTE — PROGRESS NOTE ADULT - PROBLEM SELECTOR PLAN 1
plan for cath   NSTEMI   hold AC
plan for cath   NSTEMI   cath : stent restenosis now s/p angioplasty   cont meds
plan for cath   NSTEMI   hold AC

## 2023-11-28 NOTE — PROGRESS NOTE ADULT - PROBLEM SELECTOR PLAN 5
- Lantus 20u qhs   - Lispro 5u TIDAC  - ISS ACHS  - NPO for procedure >>> DASH/CC diet  - Check A1c
- Lantus 20u qhs   - Lispro 5u TIDAC  - ISS ACHS
- Lantus 20u qhs   - Lispro 5u TIDAC  - ISS ACHS

## 2023-11-28 NOTE — PROGRESS NOTE ADULT - SUBJECTIVE AND OBJECTIVE BOX
Date of service: 23 @ 11:30      Patient is a 68y old  Male who presents with a chief complaint of CP (2023 09:24)                                                               INTERVAL HPI/OVERNIGHT EVENTS:    REVIEW OF SYSTEMS:     CONSTITUTIONAL: No weakness, fevers or chills  EYES/ENT: No visual changes , no ear ache   NECK: No pain or stiffness  RESPIRATORY: No cough, wheezing,  No shortness of breath  CARDIOVASCULAR: No chest pain or palpitations  GASTROINTESTINAL: No abdominal pain  . No nausea, vomiting, or hematemesis; No diarrhea or constipation. No melena or hematochezia.  GENITOURINARY: No dysuria, frequency or hematuria  NEUROLOGICAL: No numbness or weakness  SKIN: No itching, burning, rashes, or lesions                                                                                                                                                                                                                                                                                 Medications:  MEDICATIONS  (STANDING):  aspirin  chewable 81 milliGRAM(s) Oral daily  atorvastatin 80 milliGRAM(s) Oral at bedtime  chlorhexidine 2% Cloths 1 Application(s) Topical daily  clopidogrel Tablet 75 milliGRAM(s) Oral daily  dextrose 5%. 1000 milliLiter(s) (100 mL/Hr) IV Continuous <Continuous>  dextrose 5%. 1000 milliLiter(s) (50 mL/Hr) IV Continuous <Continuous>  dextrose 50% Injectable 12.5 Gram(s) IV Push once  dextrose 50% Injectable 25 Gram(s) IV Push once  dextrose 50% Injectable 25 Gram(s) IV Push once  glucagon  Injectable 1 milliGRAM(s) IntraMuscular once  insulin glargine Injectable (LANTUS) 20 Unit(s) SubCutaneous at bedtime  insulin lispro (ADMELOG) corrective regimen sliding scale   SubCutaneous three times a day before meals  insulin lispro (ADMELOG) corrective regimen sliding scale   SubCutaneous at bedtime  insulin lispro Injectable (ADMELOG) 5 Unit(s) SubCutaneous three times a day before meals  metoprolol tartrate 50 milliGRAM(s) Oral two times a day  Nephro-dainel 1 Tablet(s) Oral daily  rivaroxaban 15 milliGRAM(s) Oral with dinner  sacubitril 49 mG/valsartan 51 mG 1 Tablet(s) Oral two times a day  sodium chloride 0.65% Nasal 1 Spray(s) Both Nostrils three times a day  sodium chloride 0.9%. 1000 milliLiter(s) (40 mL/Hr) IV Continuous <Continuous>  sodium chloride 0.9%. 1000 milliLiter(s) (100 mL/Hr) IV Continuous <Continuous>  sodium chloride 0.9%. 500 milliLiter(s) (50 mL/Hr) IV Continuous <Continuous>    MEDICATIONS  (PRN):  acetaminophen     Tablet .. 650 milliGRAM(s) Oral every 6 hours PRN Temp greater or equal to 38C (100.4F), Mild Pain (1 - 3)  aluminum hydroxide/magnesium hydroxide/simethicone Suspension 30 milliLiter(s) Oral every 4 hours PRN Dyspepsia  dextrose Oral Gel 15 Gram(s) Oral once PRN Blood Glucose LESS THAN 70 milliGRAM(s)/deciliter  melatonin 3 milliGRAM(s) Oral at bedtime PRN Insomnia  ondansetron Injectable 4 milliGRAM(s) IV Push every 8 hours PRN Nausea and/or Vomiting       Allergies    No Known Allergies    Intolerances      Vital Signs Last 24 Hrs  T(C): 36.5 (2023 11:15), Max: 36.7 (2023 11:47)  T(F): 97.7 (2023 11:15), Max: 98 (2023 11:47)  HR: 93 (2023 11:15) (78 - 99)  BP: 119/65 (2023 11:15) (112/69 - 169/81)  BP(mean): --  RR: 18 (2023 11:15) (16 - 18)  SpO2: 97% (2023 11:15) (96% - 100%)    Parameters below as of 2023 11:15  Patient On (Oxygen Delivery Method): room air      CAPILLARY BLOOD GLUCOSE      POCT Blood Glucose.: 138 mg/dL (2023 07:19)  POCT Blood Glucose.: 107 mg/dL (2023 22:37)  POCT Blood Glucose.: 77 mg/dL (2023 22:10)  POCT Blood Glucose.: 69 mg/dL (2023 21:29)       @ 07:01  -   @ 07:00  --------------------------------------------------------  IN: 0 mL / OUT: 700 mL / NET: -700 mL     @ 07:01  -   @ 11:30  --------------------------------------------------------  IN: 360 mL / OUT: 550 mL / NET: -190 mL      Physical Exam:    Daily Height in cm: 167 (2023 12:49)    Daily Weight in k.4 (2023 08:11)  General:  Well appearing, NAD, not cachetic  HEENT:  Nonicteric, PERRLA  CV:  RRR, S1S2   Lungs:  CTA B/L, no wheezes, rales, rhonchi  Abdomen:  Soft, non-tender, no distended, positive BS  Extremities:  2+ pulses, no c/c, no edema  Skin:  Warm and dry, no rashes  :  No moreno  Neuro:  AAOx3, non-focal, grossly intact                                                                                                                                                                                                                                                                                                LABS:                               15.7   10.25 )-----------( 182      ( 2023 07:06 )             44.6                          139  |  107  |  34<H>  ----------------------------<  126<H>  4.4   |  21<L>  |  1.89<H>    Ca    9.8      2023 07:05  Phos  3.4       Mg     2.2                              RADIOLOGY & ADDITIONAL TESTS         I personally reviewed: [  ]EKG   [  ]CXR    [  ] CT      A/P:         Discussed with :     Derek consultants' Notes   Time spent :

## 2023-11-29 ENCOUNTER — TRANSCRIPTION ENCOUNTER (OUTPATIENT)
Age: 68
End: 2023-11-29

## 2023-11-29 VITALS
OXYGEN SATURATION: 96 % | RESPIRATION RATE: 19 BRPM | DIASTOLIC BLOOD PRESSURE: 56 MMHG | TEMPERATURE: 99 F | HEART RATE: 95 BPM | SYSTOLIC BLOOD PRESSURE: 102 MMHG

## 2023-11-29 LAB
ANION GAP SERPL CALC-SCNC: 14 MMOL/L — SIGNIFICANT CHANGE UP (ref 5–17)
ANION GAP SERPL CALC-SCNC: 14 MMOL/L — SIGNIFICANT CHANGE UP (ref 5–17)
BUN SERPL-MCNC: 40 MG/DL — HIGH (ref 7–23)
BUN SERPL-MCNC: 40 MG/DL — HIGH (ref 7–23)
CALCIUM SERPL-MCNC: 9.5 MG/DL — SIGNIFICANT CHANGE UP (ref 8.4–10.5)
CALCIUM SERPL-MCNC: 9.5 MG/DL — SIGNIFICANT CHANGE UP (ref 8.4–10.5)
CHLORIDE SERPL-SCNC: 106 MMOL/L — SIGNIFICANT CHANGE UP (ref 96–108)
CHLORIDE SERPL-SCNC: 106 MMOL/L — SIGNIFICANT CHANGE UP (ref 96–108)
CO2 SERPL-SCNC: 19 MMOL/L — LOW (ref 22–31)
CO2 SERPL-SCNC: 19 MMOL/L — LOW (ref 22–31)
CREAT SERPL-MCNC: 1.97 MG/DL — HIGH (ref 0.5–1.3)
CREAT SERPL-MCNC: 1.97 MG/DL — HIGH (ref 0.5–1.3)
EGFR: 37 ML/MIN/1.73M2 — LOW
EGFR: 37 ML/MIN/1.73M2 — LOW
GLUCOSE BLDC GLUCOMTR-MCNC: 134 MG/DL — HIGH (ref 70–99)
GLUCOSE BLDC GLUCOMTR-MCNC: 134 MG/DL — HIGH (ref 70–99)
GLUCOSE BLDC GLUCOMTR-MCNC: 90 MG/DL — SIGNIFICANT CHANGE UP (ref 70–99)
GLUCOSE BLDC GLUCOMTR-MCNC: 90 MG/DL — SIGNIFICANT CHANGE UP (ref 70–99)
GLUCOSE SERPL-MCNC: 77 MG/DL — SIGNIFICANT CHANGE UP (ref 70–99)
GLUCOSE SERPL-MCNC: 77 MG/DL — SIGNIFICANT CHANGE UP (ref 70–99)
HCT VFR BLD CALC: 43 % — SIGNIFICANT CHANGE UP (ref 39–50)
HCT VFR BLD CALC: 43 % — SIGNIFICANT CHANGE UP (ref 39–50)
HGB BLD-MCNC: 15 G/DL — SIGNIFICANT CHANGE UP (ref 13–17)
HGB BLD-MCNC: 15 G/DL — SIGNIFICANT CHANGE UP (ref 13–17)
MAGNESIUM SERPL-MCNC: 2 MG/DL — SIGNIFICANT CHANGE UP (ref 1.6–2.6)
MAGNESIUM SERPL-MCNC: 2 MG/DL — SIGNIFICANT CHANGE UP (ref 1.6–2.6)
MCHC RBC-ENTMCNC: 32.3 PG — SIGNIFICANT CHANGE UP (ref 27–34)
MCHC RBC-ENTMCNC: 32.3 PG — SIGNIFICANT CHANGE UP (ref 27–34)
MCHC RBC-ENTMCNC: 34.9 GM/DL — SIGNIFICANT CHANGE UP (ref 32–36)
MCHC RBC-ENTMCNC: 34.9 GM/DL — SIGNIFICANT CHANGE UP (ref 32–36)
MCV RBC AUTO: 92.5 FL — SIGNIFICANT CHANGE UP (ref 80–100)
MCV RBC AUTO: 92.5 FL — SIGNIFICANT CHANGE UP (ref 80–100)
NRBC # BLD: 0 /100 WBCS — SIGNIFICANT CHANGE UP (ref 0–0)
NRBC # BLD: 0 /100 WBCS — SIGNIFICANT CHANGE UP (ref 0–0)
PLATELET # BLD AUTO: 162 K/UL — SIGNIFICANT CHANGE UP (ref 150–400)
PLATELET # BLD AUTO: 162 K/UL — SIGNIFICANT CHANGE UP (ref 150–400)
POTASSIUM SERPL-MCNC: 3.9 MMOL/L — SIGNIFICANT CHANGE UP (ref 3.5–5.3)
POTASSIUM SERPL-MCNC: 3.9 MMOL/L — SIGNIFICANT CHANGE UP (ref 3.5–5.3)
POTASSIUM SERPL-SCNC: 3.9 MMOL/L — SIGNIFICANT CHANGE UP (ref 3.5–5.3)
POTASSIUM SERPL-SCNC: 3.9 MMOL/L — SIGNIFICANT CHANGE UP (ref 3.5–5.3)
RBC # BLD: 4.65 M/UL — SIGNIFICANT CHANGE UP (ref 4.2–5.8)
RBC # BLD: 4.65 M/UL — SIGNIFICANT CHANGE UP (ref 4.2–5.8)
RBC # FLD: 13.2 % — SIGNIFICANT CHANGE UP (ref 10.3–14.5)
RBC # FLD: 13.2 % — SIGNIFICANT CHANGE UP (ref 10.3–14.5)
SODIUM SERPL-SCNC: 139 MMOL/L — SIGNIFICANT CHANGE UP (ref 135–145)
SODIUM SERPL-SCNC: 139 MMOL/L — SIGNIFICANT CHANGE UP (ref 135–145)
WBC # BLD: 8.82 K/UL — SIGNIFICANT CHANGE UP (ref 3.8–10.5)
WBC # BLD: 8.82 K/UL — SIGNIFICANT CHANGE UP (ref 3.8–10.5)
WBC # FLD AUTO: 8.82 K/UL — SIGNIFICANT CHANGE UP (ref 3.8–10.5)
WBC # FLD AUTO: 8.82 K/UL — SIGNIFICANT CHANGE UP (ref 3.8–10.5)

## 2023-11-29 PROCEDURE — 96374 THER/PROPH/DIAG INJ IV PUSH: CPT

## 2023-11-29 PROCEDURE — C1887: CPT

## 2023-11-29 PROCEDURE — C1769: CPT

## 2023-11-29 PROCEDURE — 85027 COMPLETE CBC AUTOMATED: CPT

## 2023-11-29 PROCEDURE — 93454 CORONARY ARTERY ANGIO S&I: CPT | Mod: 59

## 2023-11-29 PROCEDURE — 93306 TTE W/DOPPLER COMPLETE: CPT

## 2023-11-29 PROCEDURE — 36415 COLL VENOUS BLD VENIPUNCTURE: CPT

## 2023-11-29 PROCEDURE — 92920 PRQ TRLUML C ANGIOP 1ART&/BR: CPT | Mod: LC

## 2023-11-29 PROCEDURE — 99232 SBSQ HOSP IP/OBS MODERATE 35: CPT

## 2023-11-29 PROCEDURE — 71045 X-RAY EXAM CHEST 1 VIEW: CPT

## 2023-11-29 PROCEDURE — 87640 STAPH A DNA AMP PROBE: CPT

## 2023-11-29 PROCEDURE — 80061 LIPID PANEL: CPT

## 2023-11-29 PROCEDURE — 83735 ASSAY OF MAGNESIUM: CPT

## 2023-11-29 PROCEDURE — 99285 EMERGENCY DEPT VISIT HI MDM: CPT | Mod: 25

## 2023-11-29 PROCEDURE — 84484 ASSAY OF TROPONIN QUANT: CPT

## 2023-11-29 PROCEDURE — 82962 GLUCOSE BLOOD TEST: CPT

## 2023-11-29 PROCEDURE — 85025 COMPLETE CBC W/AUTO DIFF WBC: CPT

## 2023-11-29 PROCEDURE — C1725: CPT

## 2023-11-29 PROCEDURE — 83036 HEMOGLOBIN GLYCOSYLATED A1C: CPT

## 2023-11-29 PROCEDURE — 93005 ELECTROCARDIOGRAM TRACING: CPT

## 2023-11-29 PROCEDURE — 87641 MR-STAPH DNA AMP PROBE: CPT

## 2023-11-29 PROCEDURE — 83880 ASSAY OF NATRIURETIC PEPTIDE: CPT

## 2023-11-29 PROCEDURE — 84100 ASSAY OF PHOSPHORUS: CPT

## 2023-11-29 PROCEDURE — 80048 BASIC METABOLIC PNL TOTAL CA: CPT

## 2023-11-29 PROCEDURE — 92941 PRQ TRLML REVSC TOT OCCL AMI: CPT | Mod: LD

## 2023-11-29 PROCEDURE — C1894: CPT

## 2023-11-29 PROCEDURE — 85730 THROMBOPLASTIN TIME PARTIAL: CPT

## 2023-11-29 PROCEDURE — 80053 COMPREHEN METABOLIC PANEL: CPT

## 2023-11-29 PROCEDURE — 97161 PT EVAL LOW COMPLEX 20 MIN: CPT

## 2023-11-29 RX ORDER — SACUBITRIL AND VALSARTAN 24; 26 MG/1; MG/1
1 TABLET, FILM COATED ORAL
Refills: 0 | DISCHARGE

## 2023-11-29 RX ORDER — METOPROLOL TARTRATE 50 MG
1 TABLET ORAL
Refills: 0 | DISCHARGE

## 2023-11-29 RX ORDER — ACETAMINOPHEN 500 MG
2 TABLET ORAL
Qty: 0 | Refills: 0 | DISCHARGE
Start: 2023-11-29

## 2023-11-29 RX ORDER — SACUBITRIL AND VALSARTAN 24; 26 MG/1; MG/1
1 TABLET, FILM COATED ORAL
Qty: 60 | Refills: 0
Start: 2023-11-29 | End: 2023-12-28

## 2023-11-29 RX ORDER — METOPROLOL TARTRATE 50 MG
1 TABLET ORAL
Qty: 60 | Refills: 0
Start: 2023-11-29 | End: 2023-12-28

## 2023-11-29 RX ORDER — ASPIRIN/CALCIUM CARB/MAGNESIUM 324 MG
1 TABLET ORAL
Qty: 7 | Refills: 0
Start: 2023-11-29 | End: 2023-12-05

## 2023-11-29 RX ADMIN — Medication 81 MILLIGRAM(S): at 11:50

## 2023-11-29 RX ADMIN — Medication 1 TABLET(S): at 11:50

## 2023-11-29 RX ADMIN — Medication 5 UNIT(S): at 11:51

## 2023-11-29 RX ADMIN — Medication 50 MILLIGRAM(S): at 05:29

## 2023-11-29 RX ADMIN — Medication 1 SPRAY(S): at 05:29

## 2023-11-29 RX ADMIN — CHLORHEXIDINE GLUCONATE 1 APPLICATION(S): 213 SOLUTION TOPICAL at 11:51

## 2023-11-29 RX ADMIN — CLOPIDOGREL BISULFATE 75 MILLIGRAM(S): 75 TABLET, FILM COATED ORAL at 11:50

## 2023-11-29 RX ADMIN — SACUBITRIL AND VALSARTAN 1 TABLET(S): 24; 26 TABLET, FILM COATED ORAL at 05:29

## 2023-11-29 RX ADMIN — Medication 5 UNIT(S): at 07:49

## 2023-11-29 NOTE — DISCHARGE NOTE PROVIDER - PROVIDER TOKENS
PROVIDER:[TOKEN:[[id number]],FOLLOWUP:[1 week]],PROVIDER:[TOKEN:[[id number]],FOLLOWUP:[1 week]],PROVIDER:[TOKEN:[[id number]],FOLLOWUP:[1 week]] PROVIDER:[TOKEN:[[id number]],FOLLOWUP:[1 week]],PROVIDER:[TOKEN:[[id number]],FOLLOWUP:[1 week]],PROVIDER:[TOKEN:[[id number]],FOLLOWUP:[1 week]] PROVIDER:[TOKEN:[[id number]],FOLLOWUP:[1 week]],PROVIDER:[TOKEN:[[id number]],FOLLOWUP:[1 week]],PROVIDER:[TOKEN:[[id number]],FOLLOWUP:[1 week]]

## 2023-11-29 NOTE — DISCHARGE NOTE PROVIDER - NSDCCPCAREPLAN_GEN_ALL_CORE_FT
PRINCIPAL DISCHARGE DIAGNOSIS  Diagnosis: Acute chest pain  Assessment and Plan of Treatment: You had a cardiac cath and now cardiology cleared you for discharge.  You should continue with Aspirin for 1 week, then stop per interventional cardiology.  Continue with Plavix and Xarelto as prescribed.  Please follow up with cardiology and PCP within 1 week.  HOME CARE INSTRUCTIONS  For the next few days, avoid physical activities that bring on chest pain. Continue physical activities as directed.  Do not smoke.  Avoid drinking alcohol.   Only take over-the-counter or prescription medicine for pain, discomfort, or fever as directed by your caregiver.  Follow your caregiver's suggestions for further testing if your chest pain does not go away.  Keep any follow-up appointments you made. If you do not go to an appointment, you could develop lasting (chronic) problems with pain. If there is any problem keeping an appointment, you must call to reschedule.   SEEK MEDICAL CARE IF:  You think you are having problems from the medicine you are taking. Read your medicine instructions carefully.  Your chest pain does not go away, even after treatment.  You develop a rash with blisters on your chest.  SEEK IMMEDIATE MEDICAL CARE IF:  You have increased chest pain or pain that spreads to your arm, neck, jaw, back, or abdomen.   You develop shortness of breath, an increasing cough, or you are coughing up blood.  You have severe back or abdominal pain, feel nauseous, or vomit.  You develop severe weakness, fainting, or chills.  You have a fever.      SECONDARY DISCHARGE DIAGNOSES  Diagnosis: Chronic atrial fibrillation  Assessment and Plan of Treatment: Continue Metoprolol and Xarelto.  Please follow up with cardiology and PCP within 1 week of discharge.  Atrial fibrillation is the most common heart rhythm problem.  The condition puts you at risk for has stroke and heart attack  It helps if you control your blood pressure, not drink more than 1-2 alcohol drinks per day, cut down on caffeine, getting treatment for over active thyroid gland, and get regular exercise  Call your doctor if you feel your heart racing or beating unusually, chest tightness or pain, lightheaded, faint, shortness of breath especially with exercise  It is important to take your heart medication as prescribed  You may be on anticoagulation which is very important to take as directed - you may need blood work to monitor drug levels    Diagnosis: BARRY (acute kidney injury)  Assessment and Plan of Treatment: Continue to follow up with nephrology within 1 week for repeat bloodwork.  Avoid taking (NSAIDs) - (ex: Ibuprofen, Advil, Celebrex, Naprosyn)  Avoid taking any nephrotoxic agents (can harm kidneys) - Intravenous contrast for diagnostic testing, combination cold medications.  Have all medications adjusted for your renal function by your Health Care Provider.  Blood pressure control is important.  Take all medication as prescribed.    Diagnosis: DM2 (diabetes mellitus, type 2)  Assessment and Plan of Treatment: Make sure you get your HgA1c checked every three months.  If you take oral diabetes medications, check your blood glucose two times a day.  If you take insulin, check your blood glucose before meals and at bedtime.  It's important not to skip any meals.  Keep a log of your blood glucose results and always take it with you to your doctor appointments.  Keep a list of your current medications including injectables and over the counter medications and bring this medication list with you to all your doctor appointments.  If you have not seen your ophthalmologist this year call for appointment.  Check your feet daily for redness, sores, or openings. Do not self treat. If no improvement in two days call your primary care physician for an appointment.  Low blood sugar (hypoglycemia) is a blood sugar below 70mg/dl. Check your blood sugar if you feel signs/symptoms of hypoglycemia. If your blood sugar is below 70 take 15 grams of carbohydrates (ex 4 oz of apple juice, 3-4 glucose tablets, or 4-6 oz of regular soda) wait 15 minutes and repeat blood sugar to make sure it comes up above 70.  If your blood sugar is above 70 and you are due for a meal, have a meal.  If you are not due for a meal have a snack.  This snack helps keeps your blood sugar at a safe range.

## 2023-11-29 NOTE — DISCHARGE NOTE PROVIDER - NSDCMRMEDTOKEN_GEN_ALL_CORE_FT
atorvastatin 80 mg oral tablet: 1 tab(s) orally once a day   clopidogrel 75 mg oral tablet: 1 tab(s) orally once a day  Entresto 49 mg-51 mg oral tablet: 1 tab(s) orally 2 times a day  ezetimibe 10 mg oral tablet: 1 tab(s) orally once a day  furosemide 40 mg oral tablet: 1 tab(s) orally once a day  metoprolol succinate 100 mg oral tablet, extended release: 1 tab(s) orally once a day  rivaroxaban 20 mg oral tablet: 1 tab(s) orally once a day  Semglee (Prefilled Pen) 100 units/mL subcutaneous solution: 30-40units subcutaneously once daily   acetaminophen 325 mg oral tablet: 2 tab(s) orally every 6 hours As needed Temp greater or equal to 38C (100.4F), Mild Pain (1 - 3)  aspirin 81 mg oral tablet, chewable: 1 tab(s) orally once a day  atorvastatin 80 mg oral tablet: 1 tab(s) orally once a day   clopidogrel 75 mg oral tablet: 1 tab(s) orally once a day  Entresto 49 mg-51 mg oral tablet: 1 tab(s) orally 2 times a day  ezetimibe 10 mg oral tablet: 1 tab(s) orally once a day  furosemide 40 mg oral tablet: 1 tab(s) orally once a day please hold on 11/29, 11/30, and restart on 12/1  metoprolol tartrate 50 mg oral tablet: 1 tab(s) orally 2 times a day  rivaroxaban 20 mg oral tablet: 1 tab(s) orally once a day  Semglee (Prefilled Pen) 100 units/mL subcutaneous solution: 30-40units subcutaneously once daily   acetaminophen 325 mg oral tablet: 2 tab(s) orally every 6 hours As needed Temp greater or equal to 38C (100.4F), Mild Pain (1 - 3)  aspirin 81 mg oral tablet, chewable: 1 tab(s) orally once a day  atorvastatin 80 mg oral tablet: 1 tab(s) orally once a day   clopidogrel 75 mg oral tablet: 1 tab(s) orally once a day  ezetimibe 10 mg oral tablet: 1 tab(s) orally once a day  furosemide 40 mg oral tablet: 1 tab(s) orally once a day please hold on 11/29, 11/30, and restart on 12/1  metoprolol tartrate 50 mg oral tablet: 1 tab(s) orally 2 times a day  rivaroxaban 20 mg oral tablet: 1 tab(s) orally once a day  sacubitril-valsartan 49 mg-51 mg oral tablet: 1 tab(s) orally 2 times a day  Semglee (Prefilled Pen) 100 units/mL subcutaneous solution: 30-40units subcutaneously once daily

## 2023-11-29 NOTE — DISCHARGE NOTE PROVIDER - CARE PROVIDER_API CALL
Markell Walton  Nephrology  [address], [address]  [place] [address]  Phone: [phone]  Fax: [phone]  Follow Up Time: 1 week    Alfred Moon  Cardiology  [address] [place] [address]  Phone: [phone]  Fax: [phone]  Follow Up Time: 1 week    Noa Carl  Family Medicine  [address], [facility] [address]  [place] [address]  Phone: [phone]  Fax: [phone]  Follow Up Time: 1 week   Markell Walton  Nephrology  [address], [address]  [place] [address]  Phone: [phone]  Fax: [phone]  Follow Up Time: 1 week    Alfred Moon  Cardiology  [address] [place] [address]  Phone: [phone]  Fax: [phone]  Follow Up Time: 1 week    Noa Carl  Family Medicine  [address], [facility] [address]  [place] [address]  Phone: [phone]  Fax: [phone]  Follow Up Time: 1 week   Markell Walton  Nephrology  [address]  [place] [address]  Phone: [phone]  Fax: [phone]  Follow Up Time: 1 week    Alfred Moon  Cardiology  [address] [place] [address]  Phone: [phone]  Fax: [phone]  Follow Up Time: 1 week    Noa Carl  Family Medicine  [address], [facility] [address]  [place] [address]  Phone: [phone]  Fax: [phone]  Follow Up Time: 1 week

## 2023-11-29 NOTE — DISCHARGE NOTE NURSING/CASE MANAGEMENT/SOCIAL WORK - PATIENT PORTAL LINK FT
You can access the FollowMyHealth Patient Portal offered by [facility] by registering at the following website: [email or web address]. By joining [employer]’s FollowMyHealth portal, you will also be able to view your health information using other applications (apps) compatible with our system.

## 2023-11-29 NOTE — DISCHARGE NOTE PROVIDER - NSDCFUADDAPPT_GEN_ALL_CORE_FT
APPTS ARE READY TO BE MADE: [X] YES    Best Family or Patient Contact (if needed):    Additional Information about above appointments (if needed):    1:   2:   3:     Other comments or requests:    APPTS ARE READY TO BE MADE: [X] YES    Best Family or Patient Contact (if needed):    Additional Information about above appointments (if needed):    1:   2:   3:     Other comments or requests:   Patient can only do latest appointments on Thursdays or Friday 12/15 or 12/29. Both provider's Dr. Carl and Dr. Walton share an office and the office is going to call us back to see if the providers can see the patient on the same day.   APPTS ARE READY TO BE MADE: [X] YES    Best Family or Patient Contact (if needed):    Additional Information about above appointments (if needed):    1:   2:   3:     Other comments or requests:   Patient can only do latest appointments on Thursdays or Friday 12/15 or 12/29. Both provider's Dr. Carl and Dr. Walton share an office and the office is going to call us back to see if the providers can see the patient on the same day.    Patient was scheduled with Dr. Alfred Moon on 12/29 at 10:30am at [address] [facility].    APPTS ARE READY TO BE MADE: [X] YES    Best Family or Patient Contact (if needed):    Additional Information about above appointments (if needed):    1:   2:   3:     Other comments or requests:   Patient can only do latest appointments on Thursdays or Friday 12/15 or 12/29. Both provider's Dr. Carl and Dr. Walton share an office and the office is going to call us back to see if the providers can see the patient on the same day.    Patient was scheduled with Dr. Alfred Moon on 12/29 at 10:30am at [address] [facility].    APPTS ARE READY TO BE MADE: [X] YES    Best Family or Patient Contact (if needed):    Additional Information about above appointments (if needed):    1:   2:   3:     Other comments or requests:   Patient can only do latest appointments on Thursdays or Friday 12/15 or 12/29. Both provider's Dr. Carl and Dr. Walton share an office and the office is going to call us back to see if the providers can see the patient on the same day.    Patient was scheduled with Dr. Alfred Moon on 12/29 at 10:30am at [address] [facility].    APPTS ARE READY TO BE MADE: [X] YES    Best Family or Patient Contact (if needed):    Additional Information about above appointments (if needed):    1:   2:   3:     Other comments or requests:   Patient can only do latest appointments on Thursdays or Friday 12/15 or 12/29. Both provider's Dr. Carl and Dr. Walton share an office and the office is going to call us back to see if the providers can see the patient on the same day.    Patient was scheduled with Dr. Alfred Moon on 12/29 at 10:30am at [facility].       Patient was scheduled with Dr. Markell Walton on 1/11 at 1P at [address] [place] [address]. The office was unable to accommodate the patient for an appointment during the times he requested. Email was sent to patient as the office and I tried calling the patient to offer a sooner appointment in the [place] office however the patient did not answer.     Patient already had an appointment scheduled with Dr. Carl on 1/30 at 3:30pm at [address] [place] [address]. The office can offer the patient to move the appointment with Meseret on the same day as Dr. Walton however we called the patient first to confirm if that would be okay and he did not answer. An email was sent to patient asking him to reach out to the office if he would like to switch the appointment.  APPTS ARE READY TO BE MADE: [X] YES    Best Family or Patient Contact (if needed):    Additional Information about above appointments (if needed):    1:   2:   3:     Other comments or requests:   Patient can only do latest appointments on Thursdays or Friday 12/15 or 12/29. Both provider's Dr. Carl and Dr. Walton share an office and the office is going to call us back to see if the providers can see the patient on the same day.    Patient was scheduled with Dr. Alfred Moon on 12/29 at 10:30am at [facility].       Patient was scheduled with Dr. Markell Walton on 1/11 at 1P at [address] [place] [address]. The office was unable to accommodate the patient for an appointment during the times he requested. Email was sent to patient as the office and I tried calling the patient to offer a sooner appointment in the [place] office however the patient did not answer.     Patient already had an appointment scheduled with Dr. Carl on 1/30 at 3:30pm at [address] [place] [address]. The office can offer the patient to move the appointment with Meseret on the same day as Dr. Walton however we called the patient first to confirm if that would be okay and he did not answer. An email was sent to patient asking him to reach out to the office if he would like to switch the appointment.  APPTS ARE READY TO BE MADE: [X] YES    Best Family or Patient Contact (if needed):    Additional Information about above appointments (if needed):    1:   2:   3:     Other comments or requests:   Patient can only do latest appointments on Thursdays or Friday 12/15 or 12/29. Both provider's Dr. Carl and Dr. Walton share an office and the office is going to call us back to see if the providers can see the patient on the same day.    Patient was scheduled with Dr. Alfred Moon on 12/29 at 10:30am at [facility].       Patient was scheduled with Dr. Markell Walton on 1/11 at 1P at [address] [place] [address]. The office was unable to accommodate the patient for an appointment during the times he requested. Email was sent to patient as the office and I tried calling the patient to offer a sooner appointment in the [place] office however the patient did not answer.     Patient already had an appointment scheduled with Dr. Carl on 1/30 at 3:30pm at [address] [place] [address]. The office can offer the patient to move the appointment with Meseret on the same day as Dr. Walton however we called the patient first to confirm if that would be okay and he did not answer. An email was sent to patient asking him to reach out to the office if he would like to switch the appointment.

## 2023-11-29 NOTE — PROGRESS NOTE ADULT - SUBJECTIVE AND OBJECTIVE BOX
SUBJECTIVE:  NO CP no SOB. Sitting up comfortably 	    MEDICATIONS:  metoprolol tartrate 50 milliGRAM(s) Oral two times a day  sacubitril 49 mG/valsartan 51 mG 1 Tablet(s) Oral two times a day        acetaminophen     Tablet .. 650 milliGRAM(s) Oral every 6 hours PRN  melatonin 3 milliGRAM(s) Oral at bedtime PRN  ondansetron Injectable 4 milliGRAM(s) IV Push every 8 hours PRN    aluminum hydroxide/magnesium hydroxide/simethicone Suspension 30 milliLiter(s) Oral every 4 hours PRN    atorvastatin 80 milliGRAM(s) Oral at bedtime  dextrose 50% Injectable 25 Gram(s) IV Push once  dextrose 50% Injectable 25 Gram(s) IV Push once  dextrose 50% Injectable 12.5 Gram(s) IV Push once  dextrose Oral Gel 15 Gram(s) Oral once PRN  glucagon  Injectable 1 milliGRAM(s) IntraMuscular once  insulin glargine Injectable (LANTUS) 20 Unit(s) SubCutaneous at bedtime  insulin lispro (ADMELOG) corrective regimen sliding scale   SubCutaneous three times a day before meals  insulin lispro (ADMELOG) corrective regimen sliding scale   SubCutaneous at bedtime  insulin lispro Injectable (ADMELOG) 5 Unit(s) SubCutaneous three times a day before meals    aspirin  chewable 81 milliGRAM(s) Oral daily  chlorhexidine 2% Cloths 1 Application(s) Topical daily  clopidogrel Tablet 75 milliGRAM(s) Oral daily  dextrose 5%. 1000 milliLiter(s) IV Continuous <Continuous>  dextrose 5%. 1000 milliLiter(s) IV Continuous <Continuous>  Nephro-daniel 1 Tablet(s) Oral daily  rivaroxaban 15 milliGRAM(s) Oral with dinner  sodium chloride 0.9%. 1000 milliLiter(s) IV Continuous <Continuous>  sodium chloride 0.9%. 1000 milliLiter(s) IV Continuous <Continuous>  sodium chloride 0.9%. 500 milliLiter(s) IV Continuous <Continuous>      REVIEW OF SYSTEMS:    CONSTITUTIONAL: No fever, weight loss, or fatigue  EYES: No eye pain, visual disturbances, or discharge  NECK: No pain or stiffness  RESPIRATORY: No cough, wheezing, chills or hemoptysis; No Shortness of Breath  CARDIOVASCULAR: No chest pain, palpitations, dizziness, or leg swelling  GASTROINTESTINAL: No abdominal or epigastric pain. No nausea, vomiting, or hematemesis; No diarrhea or constipation. No melena or hematochezia.  GENITOURINARY: No dysuria, frequency, hematuria, or incontinence  NEUROLOGICAL: No headaches, memory loss, loss of strength, numbness, or tremors  SKIN: No itching, burning, rashes, or lesions   LYMPH Nodes: No enlarged glands  MUSCULOSKELETAL: No joint pain or swelling; No muscle, back, or extremity pain  All other review of systems are negative.  	  [ ] Unable to obtain    PHYSICAL EXAM:  T(C): 36.6 (11-29-23 @ 04:32), Max: 36.6 (11-29-23 @ 04:32)  HR: 99 (11-29-23 @ 04:32) (87 - 99)  BP: 120/66 (11-29-23 @ 04:32) (119/65 - 138/68)  RR: 18 (11-29-23 @ 04:32) (18 - 18)  SpO2: 96% (11-29-23 @ 04:32) (96% - 97%)  Wt(kg): --  I&O's Summary    28 Nov 2023 07:01  -  29 Nov 2023 07:00  --------------------------------------------------------  IN: 600 mL / OUT: 2300 mL / NET: -1700 mL    29 Nov 2023 07:01  -  29 Nov 2023 09:01  --------------------------------------------------------  IN: 240 mL / OUT: 0 mL / NET: 240 mL          PHYSICAL EXAM    Appearance: Normal	  HEENT:   Normal oral mucosa, PERRL, EOMI	  NECK: Soft and supple, No LAD, No JVD  Cardiovascular: Regular Rate and Rhythm, Normal S1 S2, No murmurs, No clicks, gallops or rubs  Respiratory: Lungs clear to auscultation	  Gastrointestinal:  Soft, Non-tender, + BS	  Skin: No rashes, No ecchymoses, No cyanosis  Neurologic: Non-focal  Extremities: No clubbing, cyanosis or edema  Vascular: Peripheral pulses palpable 2+ bilaterally    LABS:	 	                            15.0   8.82  )-----------( 162      ( 29 Nov 2023 07:17 )             43.0     11-29    139  |  106  |  40<H>  ----------------------------<  77  3.9   |  19<L>  |  1.97<H>    Ca    9.5      29 Nov 2023 07:19  Mg     2.0     11-29

## 2023-11-29 NOTE — DISCHARGE NOTE PROVIDER - CARE PROVIDERS DIRECT ADDRESSES
[email or web address]

## 2023-11-29 NOTE — PROGRESS NOTE ADULT - SUBJECTIVE AND OBJECTIVE BOX
NEPHROLOGY     Patient seen and examined sitting on bed, reports feeling well, voiding without issues, denies cp or sob, in no acute distress.     MEDICATIONS  (STANDING):  aspirin  chewable 81 milliGRAM(s) Oral daily  atorvastatin 80 milliGRAM(s) Oral at bedtime  chlorhexidine 2% Cloths 1 Application(s) Topical daily  clopidogrel Tablet 75 milliGRAM(s) Oral daily  dextrose 5%. 1000 milliLiter(s) (50 mL/Hr) IV Continuous <Continuous>  dextrose 5%. 1000 milliLiter(s) (100 mL/Hr) IV Continuous <Continuous>  dextrose 50% Injectable 25 Gram(s) IV Push once  dextrose 50% Injectable 25 Gram(s) IV Push once  dextrose 50% Injectable 12.5 Gram(s) IV Push once  glucagon  Injectable 1 milliGRAM(s) IntraMuscular once  insulin glargine Injectable (LANTUS) 20 Unit(s) SubCutaneous at bedtime  insulin lispro (ADMELOG) corrective regimen sliding scale   SubCutaneous three times a day before meals  insulin lispro (ADMELOG) corrective regimen sliding scale   SubCutaneous at bedtime  insulin lispro Injectable (ADMELOG) 5 Unit(s) SubCutaneous three times a day before meals  metoprolol tartrate 50 milliGRAM(s) Oral two times a day  Nephro-daniel 1 Tablet(s) Oral daily  rivaroxaban 15 milliGRAM(s) Oral with dinner  sacubitril 49 mG/valsartan 51 mG 1 Tablet(s) Oral two times a day  sodium chloride 0.9%. 500 milliLiter(s) (50 mL/Hr) IV Continuous <Continuous>    VITALS:  T(C): , Max: 37.1 (11-29-23 @ 11:08)  T(F): , Max: 98.8 (11-29-23 @ 11:08)  HR: 95 (11-29-23 @ 11:08)  BP: 102/56 (11-29-23 @ 11:08)  BP(mean): --  RR: 19 (11-29-23 @ 11:08)  SpO2: 96% (11-29-23 @ 11:08)    I and O's:    11-28 @ 07:01  -  11-29 @ 07:00  --------------------------------------------------------  IN: 600 mL / OUT: 2300 mL / NET: -1700 mL    11-29 @ 07:01  -  11-29 @ 11:46  --------------------------------------------------------  IN: 240 mL / OUT: 0 mL / NET: 240 mL    PHYSICAL EXAM:  Constitutional: NAD, Alert  HEENT: NCAT, MMM  Neck: Supple, No JVD  Respiratory: CTA-b/l  Cardiovascular: RRR s1s2, no m/r/g  Gastrointestinal: BS+, soft, NT/ND  Extremities: No peripheral edema b/l  Neurological: no focal deficits; strength grossly intact  Back: no CVAT b/l  Skin: No rashes, no nevi    LABS:                        15.0   8.82  )-----------( 162      ( 29 Nov 2023 07:17 )             43.0     11-29    139  |  106  |  40<H>  ----------------------------<  77  3.9   |  19<L>  |  1.97<H>    Ca    9.5      29 Nov 2023 07:19  Mg     2.0     11-29

## 2023-11-29 NOTE — PROGRESS NOTE ADULT - SUBJECTIVE AND OBJECTIVE BOX
Interventional Cardiology Post Cath Progress Note: pt seen at 11am today                Subjective:   Patient feels well- no current complaints- Denies chest pain, shortness of breath. Denies pain, numbness, tingling or swelling around (groin and wrist) access site        MEDICATIONS  (STANDING):  aspirin  chewable 81 milliGRAM(s) Oral daily  atorvastatin 80 milliGRAM(s) Oral at bedtime  chlorhexidine 2% Cloths 1 Application(s) Topical daily  clopidogrel Tablet 75 milliGRAM(s) Oral daily  dextrose 5%. 1000 milliLiter(s) (50 mL/Hr) IV Continuous <Continuous>  dextrose 5%. 1000 milliLiter(s) (100 mL/Hr) IV Continuous <Continuous>  dextrose 50% Injectable 25 Gram(s) IV Push once  dextrose 50% Injectable 12.5 Gram(s) IV Push once  dextrose 50% Injectable 25 Gram(s) IV Push once  glucagon  Injectable 1 milliGRAM(s) IntraMuscular once  insulin glargine Injectable (LANTUS) 20 Unit(s) SubCutaneous at bedtime  insulin lispro (ADMELOG) corrective regimen sliding scale   SubCutaneous three times a day before meals  insulin lispro (ADMELOG) corrective regimen sliding scale   SubCutaneous at bedtime  insulin lispro Injectable (ADMELOG) 5 Unit(s) SubCutaneous three times a day before meals  metoprolol tartrate 50 milliGRAM(s) Oral two times a day  Nephro-daniel 1 Tablet(s) Oral daily  rivaroxaban 15 milliGRAM(s) Oral with dinner  sacubitril 49 mG/valsartan 51 mG 1 Tablet(s) Oral two times a day  sodium chloride 0.9%. 500 milliLiter(s) (50 mL/Hr) IV Continuous <Continuous>    MEDICATIONS  (PRN):  acetaminophen     Tablet .. 650 milliGRAM(s) Oral every 6 hours PRN Temp greater or equal to 38C (100.4F), Mild Pain (1 - 3)  aluminum hydroxide/magnesium hydroxide/simethicone Suspension 30 milliLiter(s) Oral every 4 hours PRN Dyspepsia  dextrose Oral Gel 15 Gram(s) Oral once PRN Blood Glucose LESS THAN 70 milliGRAM(s)/deciliter  melatonin 3 milliGRAM(s) Oral at bedtime PRN Insomnia  ondansetron Injectable 4 milliGRAM(s) IV Push every 8 hours PRN Nausea and/or Vomiting      Objective:    Tele 24hrs: no event    Vital Signs Last 24 Hrs  T(C): 37.1 (29 Nov 2023 11:08), Max: 37.1 (29 Nov 2023 11:08)  T(F): 98.8 (29 Nov 2023 11:08), Max: 98.8 (29 Nov 2023 11:08)  HR: 95 (29 Nov 2023 11:08) (87 - 99)  BP: 102/56 (29 Nov 2023 11:08) (102/56 - 138/68)  BP(mean): --  RR: 19 (29 Nov 2023 11:08) (18 - 19)  SpO2: 96% (29 Nov 2023 11:08) (96% - 97%)    Parameters below as of 29 Nov 2023 11:08  Patient On (Oxygen Delivery Method): room air        11-28-23 @ 07:01  -  11-29-23 @ 07:00  --------------------------------------------------------  IN: 600 mL / OUT: 2300 mL / NET: -1700 mL    11-29-23 @ 07:01  -  11-29-23 @ 15:29  --------------------------------------------------------  IN: 480 mL / OUT: 700 mL / NET: -220 mL                              15.0   8.82  )-----------( 162      ( 29 Nov 2023 07:17 )             43.0     11-29    139  |  106  |  40<H>  ----------------------------<  77  3.9   |  19<L>  |  1.97<H>    Ca    9.5      29 Nov 2023 07:19  Mg     2.0     11-29      PTT - ( 28 Nov 2023 07:13 )  PTT:68.3 sec  Urinalysis Basic - ( 29 Nov 2023 07:19 )    Color: x / Appearance: x / SG: x / pH: x  Gluc: 77 mg/dL / Ketone: x  / Bili: x / Urobili: x   Blood: x / Protein: x / Nitrite: x   Leuk Esterase: x / RBC: x / WBC x   Sq Epi: x / Non Sq Epi: x / Bacteria: x        TTE REPORT: 11/26/23  CONCLUSIONS:   1. Left ventricular cavity is small. Left ventricular systolic function is low normal. There are no regional wall motion abnormalities seen.        CATH REPORT: 11/27/23  Procedures Performed   Procedures:           1.    Arterial Access - Right Radial   2.    Diagnostic Coronary Angiography   3.    Ultrasound Guided Access   4.    Arterial Access - Right Femoral   5.    PCI: POBA   6.    PCI: POBA     Indications:               Myocardial infarction without ST elevation  (NSTEMI)  PCI Status:               urgent   Conclusions:   NSTEMI s/p successful POBA of pLAD and pLCx stent restenoses   Recommendations:   DAPT for 12 months. Aggressive risk factor modifications.            Physical Exam:  No apparent distress, alert and oriented times three, appropriate affect  JVD is not elevated, supple  Clear to auscultation with no wheezing, rhonchi or crackles  Regular rate and rhythm with no murmur, rub or gallop  Positive bowel sounds, soft, non-tender, non-distended, no masses/guarding or rebound tenderness  Right Upper Extremity: Soft, non tender, no bleeding or hematoma, clean/dry/intact- RUE +2 palpable radial and ulnar pulses  (Right) groin: Soft, non tender, no bleeding or hematoma, clean/dry/intact- RLE/LLE +2 palpable femoral/DP/PT   No clubbing, cyanosis or edema        Assessment/Plan:   68 M PMH PAF on Xarelto, CAD multiple PCIs w/ recent TITO to ostial LM ( 6/2/23), CKD3, IDDM2, HFrEF,  p/w left CP  and ruled in for NSTEMI. Pt is now s/p  POBA of pLAD and pLCx stent restenoses.      - Femoral and Radial site stable.   - ASA/PLAVIX/XARELTO for one week, then drop ASA  - medication adherence stressed  - statin education completed, including rationale  - Continue atorvastatin, lopressor  - HFrEF cont entresto  - Diet education completed:  heart healthy diet which includes a variety of fruits and vegetables, whole grains, low fat dairy products, legumes and skinless poultry and fish; food prepared with little or no salt and minimize processed foods  -In addition, education to  avoid using NSAIDs  (Aleve, Motrin, ibuprofen, naproxen) while on plavix/xarelto, please utilize Tylenol for pain control (not to exceed 4gm in 24 hours)  -Patient aware to take DAPT  as prescribed and DO NOT STOP taking without consulting cardiologist first or STENT/s WILL CLOSE  - Reviewed and reinforced with patient:  site complications ( eg: bleeding, excruciating pain at the procedural site, large swelling ball size-  extremity numbness, tingling, temperature change), or CHEST PAIN; pt aware that if any of those occur he/she must call cardiologist IMMEDIATELY or 911 or go to nearest emergency room   - Patient verbalizes understanding of ALL OF THE ABOVE, and verbalizes understanding via teach back  -f/u appt in 2 weeks post dc with outpt cardiologist: Dr Moon  - Keep Mg >2 K>4   - cont to monitor on tele    - Care per primary team    Please check [email or web address] password [email or web address] for cardiology service schedule and contact information via TEAMS.    Alec Magallanes, LAENN

## 2023-11-29 NOTE — DISCHARGE NOTE PROVIDER - NPI NUMBER (FOR SYSADMIN USE ONLY) :
[[id number]] [[id number]],[[id number]] [[id number]]

## 2023-11-29 NOTE — PROGRESS NOTE ADULT - ASSESSMENT
68 M PMH PAF, CAD multiple PCIs w/ recent TITO to ostial LM ( 6/2/23), CKD3, IDDM2, CHF,  p/w left CP that began while driving around 5pm yesterday  First Trop - 26 at 20:09. Second Trop 160 Third 282 Fourth 203 peak Trop 11/25 at 5:27AM  LDL 44 Pain free at present + NSTEMI    s/p  POBA of pLAD and pLCx stent restenoses.  - As per interventional NOAC + ASA + Plavix for one week,   then d/c asa, continue  Plavix and Xarelto.   - Cont BB/ARNI  - for potential DC today Follow up in my office 1- 2 weeks cont Tele

## 2023-11-29 NOTE — PROGRESS NOTE ADULT - PROVIDER SPECIALTY LIST ADULT
Cardiology
Cardiology
Internal Medicine
Intervent Cardiology
Intervent Cardiology
Cardiology
Internal Medicine
Nephrology
Nephrology
Cardiology
Internal Medicine
Internal Medicine

## 2023-11-29 NOTE — DISCHARGE NOTE PROVIDER - HOSPITAL COURSE
HPI:  68 M pmh pafib, CAD multiple PCIs w/ recent TITO to ostial LM ( 6/2/23), CKD3, IDDM2, CHF,  p/w left CP that began while driving around 5pm today, no SOB. Hx of similar sx in the past usually associated w/exertion and improves w/ rest.  This current episode was not improving w/rest so pt present to ED for further eval. He took ASA 81mg x2 PTA. Endorsing chest discomfort though milder than before.     Hospital Course:    68 M pmh pafib, CAD multiple PCIs w/ recent TITO to ostial LM ( 6/2/23), CKD3, IDDM2, CHF,  p/w CP being adm for NSTEMI. First Trop - 26 at 20:09. Second Trop 160 Third 282 Fourth 203 peak Trop 11/25 at 5:27AM LDL 44 Pain free at present. S/p POBA to Cx and pPOBA to pLAD. As per interventional NOAC + ASA + Plavix for one week, then d/c asa, continue  Plavix and Xarelto. Pt with chronic atrial fibrillation and restarted on AC. C/w BB and ARNI. Pt also with mild pre-renal BARRY, possible cardio-renal component. Judicious IVF given CHF hx. Pt with DM2 and to c/w home meds on d/c. Pt with Chronic systolic heart failure and is clinically euvolemic : holding lasix for now. Cleared by cardiology and nephrology for d/c home.    Medically cleared for discharge home. D/w Dr. Mcdaniel.    Important Medication Changes and Reason: ASA for one week, then drop    Active or Pending Issues Requiring Follow-up: cards, PCP, neph    Advanced Directives:   [X] Full code  [ ] DNR  [ ] Hospice    Discharge Diagnoses:  NSTEMI  Chronic Afib  BARRY  Chronic systolic HF     HPI:  68 M pmh pafib, CAD multiple PCIs w/ recent TITO to ostial LM ( 6/2/23), CKD3, IDDM2, CHF,  p/w left CP that began while driving around 5pm today, no SOB. Hx of similar sx in the past usually associated w/exertion and improves w/ rest.  This current episode was not improving w/rest so pt present to ED for further eval. He took ASA 81mg x2 PTA. Endorsing chest discomfort though milder than before.     Hospital Course:    68 M pmh pafib, CAD multiple PCIs w/ recent TITO to ostial LM ( 6/2/23), CKD3, IDDM2, CHF,  p/w CP being adm for NSTEMI. First Trop - 26 at 20:09. Second Trop 160 Third 282 Fourth 203 peak Trop 11/25 at 5:27AM LDL 44 Pain free at present. S/p POBA to Cx and pPOBA to pLAD. As per interventional NOAC + ASA + Plavix for one week, then d/c asa, continue  Plavix and Xarelto. Pt with chronic atrial fibrillation and restarted on AC. C/w BB and ARNI. Pt also with mild pre-renal BARRY, possible cardio-renal component. Judicious IVF given CHF hx. Pt with DM2 and to c/w home meds on d/c. Pt with Chronic systolic heart failure and is clinically euvolemic : holding lasix for now. Cleared by cardiology and nephrology for d/c home.    Medically cleared for discharge home. D/w Dr. Mcdaniel.    Important Medication Changes and Reason: ASA for one week, then drop; adjusted Metoprolol dosing    Active or Pending Issues Requiring Follow-up: cards, PCP, neph    Advanced Directives:   [X] Full code  [ ] DNR  [ ] Hospice    Discharge Diagnoses:  NSTEMI  Chronic Afib  BARRY  Chronic systolic HF

## 2023-11-29 NOTE — PROGRESS NOTE ADULT - ASSESSMENT
IMPRESSION: 68M w/ DM2, CKD, AFib, CAD, and HFpEF, 11/26 p/w acute coronary syndrome    (1)Renal - nonproteinuric CKD3a - likely due to hypertension/microvascular disease  (2)BARRY - prerenally mediated - quite mild  (3)Cardiac - NSTEMI - s/p POBA to LCx/LAD 11/27. No evidence of DENNIS to date s/p cath    RECOMMEND:  (1)Can continue Entresto as ordered  (2)No renal objection to discharge, with BMP within 1 week; can f/u with Dr. Walton in 2-6 weeks    Delilah Foy, [facility]  [phone]

## 2023-12-15 NOTE — PATIENT PROFILE ADULT - FALL HARM RISK - CONCLUSION
Universal Safety Interventions
DISPLAY PLAN FREE TEXT

## 2024-01-01 RX ORDER — SPIRONOLACTONE 25 MG
1 TABLET ORAL
Qty: 60 | Refills: 0 | DISCHARGE
Start: 2024-01-01 | End: 2025-01-01

## 2024-01-05 ENCOUNTER — INPATIENT (INPATIENT)
Facility: HOSPITAL | Age: 69
LOS: 3 days | Discharge: HOME | DRG: 322 | End: 2024-01-09
Attending: GENERAL ACUTE CARE HOSPITAL | Admitting: GENERAL ACUTE CARE HOSPITAL
Payer: COMMERCIAL

## 2024-01-05 VITALS
DIASTOLIC BLOOD PRESSURE: 81 MMHG | TEMPERATURE: 98 F | SYSTOLIC BLOOD PRESSURE: 154 MMHG | WEIGHT: 138.89 LBS | OXYGEN SATURATION: 99 % | HEART RATE: 59 BPM | HEIGHT: 66 IN | RESPIRATION RATE: 20 BRPM

## 2024-01-05 DIAGNOSIS — I10 ESSENTIAL (PRIMARY) HYPERTENSION: ICD-10-CM

## 2024-01-05 DIAGNOSIS — E11.9 TYPE 2 DIABETES MELLITUS WITHOUT COMPLICATIONS: ICD-10-CM

## 2024-01-05 DIAGNOSIS — I50.22 CHRONIC SYSTOLIC (CONGESTIVE) HEART FAILURE: ICD-10-CM

## 2024-01-05 DIAGNOSIS — I25.10 ATHEROSCLEROTIC HEART DISEASE OF NATIVE CORONARY ARTERY WITHOUT ANGINA PECTORIS: ICD-10-CM

## 2024-01-05 DIAGNOSIS — I20.9 ANGINA PECTORIS, UNSPECIFIED: ICD-10-CM

## 2024-01-05 DIAGNOSIS — Z29.9 ENCOUNTER FOR PROPHYLACTIC MEASURES, UNSPECIFIED: ICD-10-CM

## 2024-01-05 DIAGNOSIS — I24.9 ACUTE ISCHEMIC HEART DISEASE, UNSPECIFIED: ICD-10-CM

## 2024-01-05 DIAGNOSIS — I48.20 CHRONIC ATRIAL FIBRILLATION, UNSPECIFIED: ICD-10-CM

## 2024-01-05 DIAGNOSIS — N17.9 ACUTE KIDNEY FAILURE, UNSPECIFIED: ICD-10-CM

## 2024-01-05 LAB
ALBUMIN SERPL ELPH-MCNC: 4.7 G/DL — SIGNIFICANT CHANGE UP (ref 3.3–5)
ALBUMIN SERPL ELPH-MCNC: 4.7 G/DL — SIGNIFICANT CHANGE UP (ref 3.3–5)
ALP SERPL-CCNC: 115 U/L — SIGNIFICANT CHANGE UP (ref 40–120)
ALP SERPL-CCNC: 115 U/L — SIGNIFICANT CHANGE UP (ref 40–120)
ALT FLD-CCNC: 23 U/L — SIGNIFICANT CHANGE UP (ref 10–45)
ALT FLD-CCNC: 23 U/L — SIGNIFICANT CHANGE UP (ref 10–45)
ANION GAP SERPL CALC-SCNC: 13 MMOL/L — SIGNIFICANT CHANGE UP (ref 5–17)
ANION GAP SERPL CALC-SCNC: 13 MMOL/L — SIGNIFICANT CHANGE UP (ref 5–17)
APTT BLD: 53.9 SEC — HIGH (ref 24.5–35.6)
APTT BLD: 53.9 SEC — HIGH (ref 24.5–35.6)
AST SERPL-CCNC: 33 U/L — SIGNIFICANT CHANGE UP (ref 10–40)
AST SERPL-CCNC: 33 U/L — SIGNIFICANT CHANGE UP (ref 10–40)
BASOPHILS # BLD AUTO: 0.03 K/UL — SIGNIFICANT CHANGE UP (ref 0–0.2)
BASOPHILS # BLD AUTO: 0.03 K/UL — SIGNIFICANT CHANGE UP (ref 0–0.2)
BASOPHILS NFR BLD AUTO: 0.4 % — SIGNIFICANT CHANGE UP (ref 0–2)
BASOPHILS NFR BLD AUTO: 0.4 % — SIGNIFICANT CHANGE UP (ref 0–2)
BILIRUB SERPL-MCNC: 2.3 MG/DL — HIGH (ref 0.2–1.2)
BILIRUB SERPL-MCNC: 2.3 MG/DL — HIGH (ref 0.2–1.2)
BUN SERPL-MCNC: 45 MG/DL — HIGH (ref 7–23)
BUN SERPL-MCNC: 45 MG/DL — HIGH (ref 7–23)
CALCIUM SERPL-MCNC: 10.7 MG/DL — HIGH (ref 8.4–10.5)
CALCIUM SERPL-MCNC: 10.7 MG/DL — HIGH (ref 8.4–10.5)
CHLORIDE SERPL-SCNC: 103 MMOL/L — SIGNIFICANT CHANGE UP (ref 96–108)
CHLORIDE SERPL-SCNC: 103 MMOL/L — SIGNIFICANT CHANGE UP (ref 96–108)
CK MB BLD-MCNC: 5.8 % — HIGH (ref 0–3.5)
CK MB BLD-MCNC: 5.8 % — HIGH (ref 0–3.5)
CK MB CFR SERPL CALC: 8.4 NG/ML — HIGH (ref 0–6.7)
CK MB CFR SERPL CALC: 8.4 NG/ML — HIGH (ref 0–6.7)
CK SERPL-CCNC: 144 U/L — SIGNIFICANT CHANGE UP (ref 30–200)
CK SERPL-CCNC: 144 U/L — SIGNIFICANT CHANGE UP (ref 30–200)
CO2 SERPL-SCNC: 27 MMOL/L — SIGNIFICANT CHANGE UP (ref 22–31)
CO2 SERPL-SCNC: 27 MMOL/L — SIGNIFICANT CHANGE UP (ref 22–31)
CREAT SERPL-MCNC: 2.19 MG/DL — HIGH (ref 0.5–1.3)
CREAT SERPL-MCNC: 2.19 MG/DL — HIGH (ref 0.5–1.3)
EGFR: 32 ML/MIN/1.73M2 — LOW
EGFR: 32 ML/MIN/1.73M2 — LOW
EOSINOPHIL # BLD AUTO: 0.13 K/UL — SIGNIFICANT CHANGE UP (ref 0–0.5)
EOSINOPHIL # BLD AUTO: 0.13 K/UL — SIGNIFICANT CHANGE UP (ref 0–0.5)
EOSINOPHIL NFR BLD AUTO: 1.5 % — SIGNIFICANT CHANGE UP (ref 0–6)
EOSINOPHIL NFR BLD AUTO: 1.5 % — SIGNIFICANT CHANGE UP (ref 0–6)
GLUCOSE BLDC GLUCOMTR-MCNC: 104 MG/DL — HIGH (ref 70–99)
GLUCOSE BLDC GLUCOMTR-MCNC: 104 MG/DL — HIGH (ref 70–99)
GLUCOSE BLDC GLUCOMTR-MCNC: 50 MG/DL — CRITICAL LOW (ref 70–99)
GLUCOSE BLDC GLUCOMTR-MCNC: 50 MG/DL — CRITICAL LOW (ref 70–99)
GLUCOSE BLDC GLUCOMTR-MCNC: 57 MG/DL — LOW (ref 70–99)
GLUCOSE BLDC GLUCOMTR-MCNC: 57 MG/DL — LOW (ref 70–99)
GLUCOSE SERPL-MCNC: 98 MG/DL — SIGNIFICANT CHANGE UP (ref 70–99)
GLUCOSE SERPL-MCNC: 98 MG/DL — SIGNIFICANT CHANGE UP (ref 70–99)
HCT VFR BLD CALC: 48.5 % — SIGNIFICANT CHANGE UP (ref 39–50)
HCT VFR BLD CALC: 48.5 % — SIGNIFICANT CHANGE UP (ref 39–50)
HGB BLD-MCNC: 16.6 G/DL — SIGNIFICANT CHANGE UP (ref 13–17)
HGB BLD-MCNC: 16.6 G/DL — SIGNIFICANT CHANGE UP (ref 13–17)
IMM GRANULOCYTES NFR BLD AUTO: 0.4 % — SIGNIFICANT CHANGE UP (ref 0–0.9)
IMM GRANULOCYTES NFR BLD AUTO: 0.4 % — SIGNIFICANT CHANGE UP (ref 0–0.9)
INR BLD: 3.2 RATIO — HIGH (ref 0.85–1.18)
INR BLD: 3.2 RATIO — HIGH (ref 0.85–1.18)
LIDOCAIN IGE QN: 83 U/L — HIGH (ref 7–60)
LIDOCAIN IGE QN: 83 U/L — HIGH (ref 7–60)
LYMPHOCYTES # BLD AUTO: 1.08 K/UL — SIGNIFICANT CHANGE UP (ref 1–3.3)
LYMPHOCYTES # BLD AUTO: 1.08 K/UL — SIGNIFICANT CHANGE UP (ref 1–3.3)
LYMPHOCYTES # BLD AUTO: 12.7 % — LOW (ref 13–44)
LYMPHOCYTES # BLD AUTO: 12.7 % — LOW (ref 13–44)
MCHC RBC-ENTMCNC: 31.9 PG — SIGNIFICANT CHANGE UP (ref 27–34)
MCHC RBC-ENTMCNC: 31.9 PG — SIGNIFICANT CHANGE UP (ref 27–34)
MCHC RBC-ENTMCNC: 34.2 GM/DL — SIGNIFICANT CHANGE UP (ref 32–36)
MCHC RBC-ENTMCNC: 34.2 GM/DL — SIGNIFICANT CHANGE UP (ref 32–36)
MCV RBC AUTO: 93.3 FL — SIGNIFICANT CHANGE UP (ref 80–100)
MCV RBC AUTO: 93.3 FL — SIGNIFICANT CHANGE UP (ref 80–100)
MONOCYTES # BLD AUTO: 0.69 K/UL — SIGNIFICANT CHANGE UP (ref 0–0.9)
MONOCYTES # BLD AUTO: 0.69 K/UL — SIGNIFICANT CHANGE UP (ref 0–0.9)
MONOCYTES NFR BLD AUTO: 8.1 % — SIGNIFICANT CHANGE UP (ref 2–14)
MONOCYTES NFR BLD AUTO: 8.1 % — SIGNIFICANT CHANGE UP (ref 2–14)
NEUTROPHILS # BLD AUTO: 6.57 K/UL — SIGNIFICANT CHANGE UP (ref 1.8–7.4)
NEUTROPHILS # BLD AUTO: 6.57 K/UL — SIGNIFICANT CHANGE UP (ref 1.8–7.4)
NEUTROPHILS NFR BLD AUTO: 76.9 % — SIGNIFICANT CHANGE UP (ref 43–77)
NEUTROPHILS NFR BLD AUTO: 76.9 % — SIGNIFICANT CHANGE UP (ref 43–77)
NRBC # BLD: 0 /100 WBCS — SIGNIFICANT CHANGE UP (ref 0–0)
NRBC # BLD: 0 /100 WBCS — SIGNIFICANT CHANGE UP (ref 0–0)
NT-PROBNP SERPL-SCNC: 5650 PG/ML — HIGH (ref 0–300)
NT-PROBNP SERPL-SCNC: 5650 PG/ML — HIGH (ref 0–300)
PLATELET # BLD AUTO: 188 K/UL — SIGNIFICANT CHANGE UP (ref 150–400)
PLATELET # BLD AUTO: 188 K/UL — SIGNIFICANT CHANGE UP (ref 150–400)
POTASSIUM SERPL-MCNC: 5 MMOL/L — SIGNIFICANT CHANGE UP (ref 3.5–5.3)
POTASSIUM SERPL-MCNC: 5 MMOL/L — SIGNIFICANT CHANGE UP (ref 3.5–5.3)
POTASSIUM SERPL-SCNC: 5 MMOL/L — SIGNIFICANT CHANGE UP (ref 3.5–5.3)
POTASSIUM SERPL-SCNC: 5 MMOL/L — SIGNIFICANT CHANGE UP (ref 3.5–5.3)
PROT SERPL-MCNC: 7.4 G/DL — SIGNIFICANT CHANGE UP (ref 6–8.3)
PROT SERPL-MCNC: 7.4 G/DL — SIGNIFICANT CHANGE UP (ref 6–8.3)
PROTHROM AB SERPL-ACNC: 32.5 SEC — HIGH (ref 9.5–13)
PROTHROM AB SERPL-ACNC: 32.5 SEC — HIGH (ref 9.5–13)
RBC # BLD: 5.2 M/UL — SIGNIFICANT CHANGE UP (ref 4.2–5.8)
RBC # BLD: 5.2 M/UL — SIGNIFICANT CHANGE UP (ref 4.2–5.8)
RBC # FLD: 13.3 % — SIGNIFICANT CHANGE UP (ref 10.3–14.5)
RBC # FLD: 13.3 % — SIGNIFICANT CHANGE UP (ref 10.3–14.5)
SODIUM SERPL-SCNC: 143 MMOL/L — SIGNIFICANT CHANGE UP (ref 135–145)
SODIUM SERPL-SCNC: 143 MMOL/L — SIGNIFICANT CHANGE UP (ref 135–145)
TROPONIN T, HIGH SENSITIVITY RESULT: 209 NG/L — HIGH (ref 0–51)
TROPONIN T, HIGH SENSITIVITY RESULT: 209 NG/L — HIGH (ref 0–51)
TROPONIN T, HIGH SENSITIVITY RESULT: 231 NG/L — HIGH (ref 0–51)
TROPONIN T, HIGH SENSITIVITY RESULT: 231 NG/L — HIGH (ref 0–51)
WBC # BLD: 8.53 K/UL — SIGNIFICANT CHANGE UP (ref 3.8–10.5)
WBC # BLD: 8.53 K/UL — SIGNIFICANT CHANGE UP (ref 3.8–10.5)
WBC # FLD AUTO: 8.53 K/UL — SIGNIFICANT CHANGE UP (ref 3.8–10.5)
WBC # FLD AUTO: 8.53 K/UL — SIGNIFICANT CHANGE UP (ref 3.8–10.5)

## 2024-01-05 PROCEDURE — 99285 EMERGENCY DEPT VISIT HI MDM: CPT

## 2024-01-05 PROCEDURE — 71045 X-RAY EXAM CHEST 1 VIEW: CPT | Mod: 26

## 2024-01-05 PROCEDURE — 99223 1ST HOSP IP/OBS HIGH 75: CPT

## 2024-01-05 RX ORDER — METOPROLOL TARTRATE 50 MG
50 TABLET ORAL
Refills: 0 | Status: DISCONTINUED | OUTPATIENT
Start: 2024-01-05 | End: 2024-01-09

## 2024-01-05 RX ORDER — FUROSEMIDE 40 MG
1 TABLET ORAL
Qty: 0 | Refills: 0 | DISCHARGE

## 2024-01-05 RX ORDER — ACETAMINOPHEN 500 MG
2 TABLET ORAL
Refills: 0 | DISCHARGE

## 2024-01-05 RX ORDER — DEXTROSE 50 % IN WATER 50 %
12.5 SYRINGE (ML) INTRAVENOUS ONCE
Refills: 0 | Status: DISCONTINUED | OUTPATIENT
Start: 2024-01-05 | End: 2024-01-09

## 2024-01-05 RX ORDER — DEXTROSE 50 % IN WATER 50 %
15 SYRINGE (ML) INTRAVENOUS ONCE
Refills: 0 | Status: DISCONTINUED | OUTPATIENT
Start: 2024-01-05 | End: 2024-01-09

## 2024-01-05 RX ORDER — ATORVASTATIN CALCIUM 80 MG/1
80 TABLET, FILM COATED ORAL AT BEDTIME
Refills: 0 | Status: DISCONTINUED | OUTPATIENT
Start: 2024-01-05 | End: 2024-01-09

## 2024-01-05 RX ORDER — SODIUM CHLORIDE 9 MG/ML
1000 INJECTION, SOLUTION INTRAVENOUS
Refills: 0 | Status: DISCONTINUED | OUTPATIENT
Start: 2024-01-05 | End: 2024-01-09

## 2024-01-05 RX ORDER — NITROGLYCERIN 6.5 MG
0.4 CAPSULE, EXTENDED RELEASE ORAL
Refills: 0 | Status: COMPLETED | OUTPATIENT
Start: 2024-01-05 | End: 2024-01-08

## 2024-01-05 RX ORDER — DEXTROSE 50 % IN WATER 50 %
25 SYRINGE (ML) INTRAVENOUS ONCE
Refills: 0 | Status: DISCONTINUED | OUTPATIENT
Start: 2024-01-05 | End: 2024-01-09

## 2024-01-05 RX ORDER — ASPIRIN/CALCIUM CARB/MAGNESIUM 324 MG
324 TABLET ORAL ONCE
Refills: 0 | Status: COMPLETED | OUTPATIENT
Start: 2024-01-05 | End: 2024-01-05

## 2024-01-05 RX ORDER — INSULIN GLARGINE 100 [IU]/ML
20 INJECTION, SOLUTION SUBCUTANEOUS AT BEDTIME
Refills: 0 | Status: DISCONTINUED | OUTPATIENT
Start: 2024-01-05 | End: 2024-01-09

## 2024-01-05 RX ORDER — INSULIN LISPRO 100/ML
VIAL (ML) SUBCUTANEOUS
Refills: 0 | Status: DISCONTINUED | OUTPATIENT
Start: 2024-01-05 | End: 2024-01-09

## 2024-01-05 RX ORDER — SODIUM CHLORIDE 9 MG/ML
250 INJECTION INTRAMUSCULAR; INTRAVENOUS; SUBCUTANEOUS ONCE
Refills: 0 | Status: COMPLETED | OUTPATIENT
Start: 2024-01-05 | End: 2024-01-05

## 2024-01-05 RX ORDER — ACETAMINOPHEN 500 MG
650 TABLET ORAL EVERY 6 HOURS
Refills: 0 | Status: DISCONTINUED | OUTPATIENT
Start: 2024-01-05 | End: 2024-01-09

## 2024-01-05 RX ORDER — RIVAROXABAN 15 MG-20MG
15 KIT ORAL
Refills: 0 | Status: DISCONTINUED | OUTPATIENT
Start: 2024-01-06 | End: 2024-01-06

## 2024-01-05 RX ORDER — INSULIN LISPRO 100/ML
VIAL (ML) SUBCUTANEOUS AT BEDTIME
Refills: 0 | Status: DISCONTINUED | OUTPATIENT
Start: 2024-01-05 | End: 2024-01-09

## 2024-01-05 RX ORDER — GLUCAGON INJECTION, SOLUTION 0.5 MG/.1ML
1 INJECTION, SOLUTION SUBCUTANEOUS ONCE
Refills: 0 | Status: DISCONTINUED | OUTPATIENT
Start: 2024-01-05 | End: 2024-01-09

## 2024-01-05 RX ORDER — EZETIMIBE 10 MG/1
1 TABLET ORAL
Qty: 0 | Refills: 0 | DISCHARGE

## 2024-01-05 RX ORDER — CLOPIDOGREL BISULFATE 75 MG/1
75 TABLET, FILM COATED ORAL DAILY
Refills: 0 | Status: DISCONTINUED | OUTPATIENT
Start: 2024-01-06 | End: 2024-01-09

## 2024-01-05 RX ORDER — ASPIRIN/CALCIUM CARB/MAGNESIUM 324 MG
1 TABLET ORAL
Refills: 0 | DISCHARGE

## 2024-01-05 RX ORDER — INSULIN GLARGINE 100 [IU]/ML
0 INJECTION, SOLUTION SUBCUTANEOUS
Refills: 0 | DISCHARGE

## 2024-01-05 RX ADMIN — Medication 50 MILLIGRAM(S): at 22:56

## 2024-01-05 RX ADMIN — Medication 324 MILLIGRAM(S): at 16:31

## 2024-01-05 RX ADMIN — ATORVASTATIN CALCIUM 80 MILLIGRAM(S): 80 TABLET, FILM COATED ORAL at 22:56

## 2024-01-05 RX ADMIN — Medication 0.4 MILLIGRAM(S): at 23:47

## 2024-01-05 RX ADMIN — SODIUM CHLORIDE 250 MILLILITER(S): 9 INJECTION INTRAMUSCULAR; INTRAVENOUS; SUBCUTANEOUS at 18:00

## 2024-01-05 NOTE — H&P ADULT - PROBLEM SELECTOR PLAN 3
EKG showing Afib, on xarelto, BB  - Continue xarelto  - Continue metoprolol tartrate   - Monitor on telemetry

## 2024-01-05 NOTE — ED ADULT NURSE NOTE - OBJECTIVE STATEMENT
pt 69 yo male states chest pain last night for approx 2 hrs resolved pt states happening frequently so he decided to come hx of stent placement x 6 done here by [facility] pt denies anyn dizziness sweating no sob no leg swelling pt on xarelto for stents and also plavix normal urination and normal bowel movement pt 67 yo male states chest pain last night for approx 2 hrs resolved pt states happening frequently so he decided to come hx of stent placement x 6 done here by [facility] pt denies anyn dizziness sweating no sob no leg swelling pt on xarelto for stents and also plavix normal urination and normal bowel movement 69 y/o M extensive cardiac hx significant for CAD s/p 6 stents, CHF, Afib additionally HTN, HLD, DM presented to ED per cardiologist recommendation due to chest pain on exertion x 2 weeks needing to take PRN nitro multiple times, last night episode lasted approx. 2 hrs, sx relieved by nitro, cardiologist told pt to come to ED for cardiac workup. Pt states pain was midsternal, non radiating and felt like pressure with some associated nausea. Pt denies any difficulty breathing, congestion, abd pain, headaches, dizziness, lightheadedness at this time. A&Ox4, breathing spontaneously and unlabored on room air, moving all extremities easily, is ambulatory. Pt is on CM. Appropriate safety measures in place, call bell within reach.

## 2024-01-05 NOTE — CHART NOTE - NSCHARTNOTEFT_GEN_A_CORE
Patient with known hx CAD with recent POBA. Has been having episodic chest pain worsening in intensity and duration. Relieved by nitroglycerin.   Currently chest pain free in ED.  EKG with afib and nonischemic, no MARQUES. Prior ST depressions no longer present.    Troponin 231  Creatinine 2.1   INR 3.2    No indication for urgent or emergent cath. Patient should be admitted for optimization prior to further ischemic evaluation.    Discussed with interventionalist Dr. Lopez.  To be followed by private cardiology group.    Sergio Huggins MD  Cardiology Fellow  All Cardiology service information can be found 24/7 on [email or web address], password: [email or web address] Patient with known hx CAD with recent POBA. Has been having episodic chest pain worsening in intensity and duration. Relieved by nitroglycerin.   Currently chest pain free in ED.  EKG with afib and nonischemic, no MARQUES. Prior ST depressions no longer present.    Troponin 231  Creatinine 2.1   INR 3.2    No indication for urgent or emergent cath. Patient should be admitted for optimization prior to further ischemic evaluation.    Discussed with interventionalist Dr. Lopez.  To be followed by private cardiology group.    Sergio Huggins MD  Cardiology Fellow  All Cardiology service information can be found 24/7 on [email or web address], password: [email or web address]

## 2024-01-05 NOTE — ED PROVIDER NOTE - PROGRESS NOTE DETAILS
Labs noted, creatinine 2.19, worsening of CKD from November versus BARRY on CKD compared to prior creatinines around 1.5-1.7.  Troponin 231 in the setting of CKD compared to priors patient was admitted for NSTEMI November 2023 with troponins increasing from 26 with a creatinine of 1.71  up to 282 with a creatinine of 1.52.  Patient with history of atrial fibrillation on Xarelto, already aspirin loaded in the ED, will hold on heparin at this time given patient already on anticoagulation.  Given patient history will admit for ACS (unstable angina v NSTEMI, given presentation >12 hrs after sxs onset) delta troponin and CK-MB ordered. -Atul Clark PA-C pt reports last took Xarelto approx 8AM today.   d/w Dr Mcdaniel who accepts admission. -Atul Clark PA-C

## 2024-01-05 NOTE — ED PROVIDER NOTE - OBJECTIVE STATEMENT
Attending note.  Patient was seen in room #20 to the right.  Patient was sent to the emergency department by his physician as he had chest pressure last night which started at rest and lasted approximately 2 hours and was finally relieved after taking a sublingual nitroglycerin.  He had slight radiation to the left scapula.  He denies any shortness of breath, nausea, vomiting, palpitations.  He has a past medical history of CAD with 6 stents the last being placed in May 2023.  Patient had chest pain in November 2023 and reports having angioplasty.  Has a history of atrial fibrillation for which she takes Eliquis and aspirin daily.  Patient took aspirin as well last night.  He is currently asymptomatic.  Chest pain typically occurs with exertion, however last night started at rest.  He has a history of hypertension, hyperlipidemia and diabetes.  He is a former smoker.  Cardiology has seen the patient in the emergency department. Attending note.  Patient was seen in room #20 to the right.  Patient was sent to the emergency department by his physician as he had chest pressure last night which started at rest and lasted approximately 2 hours and was finally relieved after taking a sublingual nitroglycerin.  He had slight radiation to the left scapula.  He denies any shortness of breath, nausea, vomiting, palpitations.  He has a past medical history of CAD with 6 stents the last being placed in May 2023.  Patient had chest pain in November 2023 and reports having angioplasty.  Has a history of atrial fibrillation for which she takes Eliquis and aspirin daily.  Patient took aspirin as well last night.  He is currently asymptomatic.  Chest pain typically occurs with exertion, however last night started at rest.  He has a history of hypertension, hyperlipidemia and diabetes.  He is a former smoker.  Cardiology has seen the patient in the emergency department.      -Atul Clark PA-C: 68-year-old male PMH of CAD with 6 stents, heart failure, A-fib on Xarelto, HTN, HLD, T2DM, former smoker, family history of CAD/ACS, MI May 20 presents to ED complaining of progressively worsening exertional chest pain over the past 2 weeks culminating in an episode of chest pain at rest last night which was relieved after taking nitroglycerin.  Pain began approximately  to 10 PM last night, persisted for 2 hours prior to patient taking nitroglycerin which she reports almost instantly relieved the pain.  Patient called his cardiologist today regarding the chest pain he experienced last night was referred to the emergency department.  Chest pain was sternal and left sternal border, with some radiation to the left shoulder, denies tearing or ripping pain or pain going through to the back.  Patient reports some nausea associated with pain, denies diaphoresis, palpitations, abdominal pain, numbness, paresthesias, weakness.  Patient took 243 mg of aspirin last night, no aspirin this morning.

## 2024-01-05 NOTE — ED PROVIDER NOTE - DIFFERENTIAL DIAGNOSIS
Differential Diagnosis Patient with chest pain and history of CAD with differential not limited to angina versus ACS

## 2024-01-05 NOTE — H&P ADULT - PROBLEM SELECTOR PLAN 1
Chest pain improved with nitroglycerin. Currently without chest pain and symptom free. Concern for ACS given history and risk factors.  - EKG reviewed showing Afib with HR 70s, TWI in II, III, avf, v5,v6 which do not appear new. No MARQUES. qtc 386.  - CXR reviewed - clear lungs  - Troponin 231 and CKMB 10.6 - trend until peak  - Can continue nitroglycerin prn for chest pain  - Seen by cardiology in ED, no plan for emergent cath, will need optimization first before further ischemic evaluation  - Monitor on telemetry

## 2024-01-05 NOTE — H&P ADULT - PROBLEM SELECTOR PLAN 4
TTE from 11/26/23 showing:  Left ventricular systolic function is low normal with an ejection fraction visually estimated at 50%. There are no regional wall motion abnormalities seen, Moderately enlarged LA.   - Currently appears euvolemic, not decompensated  - CXR clear  - Will continue BB, but hold ARNi, lasix, SGLT2 for now iso developing BARRY  - Strict I/Os, daily weights

## 2024-01-05 NOTE — H&P ADULT - NSHPPHYSICALEXAM_GEN_ALL_CORE
Vital Signs Last 24 Hrs  T(C): 37 (05 Jan 2024 19:25), Max: 37 (05 Jan 2024 19:25)  T(F): 98.6 (05 Jan 2024 19:25), Max: 98.6 (05 Jan 2024 19:25)  HR: 78 (05 Jan 2024 19:25) (59 - 78)  BP: 146/76 (05 Jan 2024 19:25) (146/76 - 175/95)  BP(mean): --  RR: 18 (05 Jan 2024 19:25) (18 - 20)  SpO2: 99% (05 Jan 2024 19:25) (99% - 99%)    Parameters below as of 05 Jan 2024 19:25  Patient On (Oxygen Delivery Method): room air        CONSTITUTIONAL: Well-groomed, in no apparent distress  EYES: No conjunctival or scleral injection, non-icteric; PERRLA and symmetric  ENMT: No external nasal lesions; nasal mucosa not inflamed; normal dentition; no pharyngeal injection or exudates, oral mucosa with moist membranes  RESPIRATORY: Breathing comfortably; lungs CTA without wheeze/rhonchi/rales  CARDIOVASCULAR: +S1S2, RRR, no M/G/R; no carotid bruits; pedal pulses full and symmetric; no lower extremity edema  GASTROINTESTINAL: No palpable masses or tenderness, +BS throughout, no rebound/guarding; no hepatosplenomegaly; no hernia palpated  GENITOURINARY:  LYMPHATIC: No cervical LAD or tenderness; no axillary LAD or tenderness; no inguinal LAD or tenderness  MUSCULOSKELETAL: Normal gait and station; no digital clubbing or cyanosis; no paraspinal tenderness; no malalignment of extremities  SKIN: No rashes or ulcers noted; no subcutaneous nodules or induration palpable  NEUROLOGIC: CN grossly intact; sensation intact in LEs b/l to light touch; normal strength and tone  PSYCHIATRIC: A+O x 3; mood and affect appropriate; appropriate insight and judgment Vital Signs Last 24 Hrs  T(C): 37 (05 Jan 2024 19:25), Max: 37 (05 Jan 2024 19:25)  T(F): 98.6 (05 Jan 2024 19:25), Max: 98.6 (05 Jan 2024 19:25)  HR: 78 (05 Jan 2024 19:25) (59 - 78)  BP: 146/76 (05 Jan 2024 19:25) (146/76 - 175/95)  BP(mean): --  RR: 18 (05 Jan 2024 19:25) (18 - 20)  SpO2: 99% (05 Jan 2024 19:25) (99% - 99%)    Parameters below as of 05 Jan 2024 19:25  Patient On (Oxygen Delivery Method): room air        CONSTITUTIONAL: Well-groomed, in no apparent distress  EYES: No conjunctival or scleral injection, non-icteric; PERRLA and symmetric  ENMT: No external nasal lesions; nasal mucosa not inflamed; oral mucosa with moist membranes  RESPIRATORY: Breathing comfortably; lungs CTA without wheeze/rhonchi/rales  CARDIOVASCULAR: irregular rhythm, +S1S2, no M/G/R; no lower extremity edema  GASTROINTESTINAL: No palpable masses or tenderness, +BS throughout, no rebound/guarding; no hepatosplenomegaly; no hernia palpated  LYMPHATIC: No cervical LAD or tenderness  MUSCULOSKELETAL: Normal gait and station; no digital clubbing or cyanosis; no paraspinal tenderness; no malalignment of extremities  SKIN: No rashes or ulcers noted; no subcutaneous nodules or induration palpable  NEUROLOGIC: CN grossly intact; sensation intact in LEs b/l to light touch; normal strength and tone  PSYCHIATRIC: A+O x 3; mood and affect appropriate; appropriate insight and judgment

## 2024-01-05 NOTE — H&P ADULT - NSHPREVIEWOFSYSTEMS_GEN_ALL_CORE
Review of Systems:   CONSTITUTIONAL: No fever, weight loss  EYES: No eye pain, visual disturbances, or discharge  ENMT:  No difficulty hearing, tinnitus, vertigo; No sinus or throat pain  RESPIRATORY: No SOB. No cough, wheezing, chills or hemoptysis  CARDIOVASCULAR: No chest pain, palpitations, dizziness, or leg swelling  GASTROINTESTINAL: No abdominal or epigastric pain. No nausea, vomiting, or hematemesis; No diarrhea or constipation. No melena or hematochezia.  GENITOURINARY: No dysuria, frequency, hematuria, or incontinence  NEUROLOGICAL: No headaches, memory loss, loss of strength, numbness, or tremors  SKIN: No itching, burning, rashes, or lesions   LYMPH NODES: No enlarged glands  ENDOCRINE: No heat or cold intolerance; No hair loss  MUSCULOSKELETAL: No joint pain or swelling; No muscle, back pain  PSYCHIATRIC: No depression, anxiety, mood swings, or difficulty sleeping  HEME/LYMPH: No easy bruising, or bleeding gums Review of Systems:   CONSTITUTIONAL: No fever, weight loss  EYES: No eye pain, visual disturbances, or discharge  ENMT:  No difficulty hearing, tinnitus, vertigo; No sinus or throat pain  RESPIRATORY: No SOB. No cough, wheezing, chills or hemoptysis  CARDIOVASCULAR: No chest pain currently, no palpitations, dizziness, or leg swelling  GASTROINTESTINAL: No abdominal or epigastric pain. No nausea, vomiting, or hematemesis; No diarrhea or constipation. No melena or hematochezia.  GENITOURINARY: No dysuria, frequency, hematuria, or incontinence  NEUROLOGICAL: No headaches, memory loss, loss of strength, numbness, or tremors  SKIN: No itching, burning, rashes, or lesions   MUSCULOSKELETAL: No joint pain or swelling; No muscle, back pain  PSYCHIATRIC: No depression, anxiety, mood swings, or difficulty sleeping  HEME/LYMPH: No easy bruising, or bleeding gums

## 2024-01-05 NOTE — ED PROVIDER NOTE - CLINICAL SUMMARY MEDICAL DECISION MAKING FREE TEXT BOX
Attending note.  Patient with history of CAD in 6 coronary stents typically has chest pain with exertion, however last night patient had 2 hours of left precordial chest tightness and pressure which was finally relieved with someone nitroglycerin.  He is currently asymptomatic.  Labs, troponin, EKG, chest x-ray.  Cardiology is following patient.

## 2024-01-05 NOTE — ED PROVIDER NOTE - CADM POA URETHRAL CATHETER
Plan: Due to current symptoms of depression and mood changes, will plan to hold accutane for 2 weeks. Will discuss more with Dr. Abad, and consider starting back at a lower dose with close follow up and close contact with his counselor, or continuing with the plan to discontinue and look for another treatment route. Will follow-up with patient via phone in 2 weeks with decision. Recommended patient follow-up with counselor. Detail Level: Zone No

## 2024-01-05 NOTE — H&P ADULT - HISTORY OF PRESENT ILLNESS
68-year-old male PMH of CAD with 6 stents (most recent TITO to ostial LM on 6/2/23, HFrEF (EF~50%, 11/26/23), A-fib on Xarelto, HTN, HLD, T2DM, CKD3, former smoker, presents to ED complaining of progressively worsening exertional chest pain over the past 2 weeks culminating in an episode of chest pain at rest last night which was relieved after taking nitroglycerin. Last admission 11/25-11/29 for NSTEMI s/p POBA to Cx and pLAD.  68-year-old male PMH of CAD with 6 stents (most recent TITO to ostial LM on 6/2/23, HFrEF (EF~50%, 11/26/23), A-fib on Xarelto, HTN, HLD, T2DM, CKD3, former smoker, presents to ED complaining of progressively worsening exertional chest pain over the past 2 weeks culminating in an episode of chest pain at rest last night which was relieved after taking nitroglycerin. Last admission 11/25-11/29 for NSTEMI s/p POBA to Cx and pLAD. Pt with several episodes of chest pain recently; most recent episode last night around 10pm. Described as sharp pain across center of chest. No radiation to back or down arms; similar to the previous episodes of chest pain. Pt took a nitroglycerin which immediately resolves the pain. Pt also took ASA once for his chest pain. He is currently on plavix and xarelto (NOT aspirin) following his last hospitalization. No other symptoms besides chest pain. In ED, EKG showing afib without signs of acute ischemia. Cardiology called with no plans for emergent cath. Patient is currently chest pain free. Pt received 324mg of ASA in ED.

## 2024-01-05 NOTE — H&P ADULT - PROBLEM SELECTOR PLAN 2
SCr 2.19, baseline around 1.9, May be developing BARRY, possibly cardiorenal vs hypovolemia vs medication induced.   - Will hold ARNi for now   - Will also hold lasix for now  - Check UA  - Monitor BMP, Trend Scr  - Monitor I/Os

## 2024-01-05 NOTE — H&P ADULT - PROBLEM SELECTOR PLAN 7
Multiple stents, last in 6/2023, POBA in Nov 2023  - Continue statin  - Continue Plavix  - NOT on ASA

## 2024-01-05 NOTE — ED ADULT NURSE NOTE - NSFALLUNIVINTERV_ED_ALL_ED
Bed/Stretcher in lowest position, wheels locked, appropriate side rails in place/Call bell, personal items and telephone in reach/Instruct patient to call for assistance before getting out of bed/chair/stretcher/Non-slip footwear applied when patient is off stretcher/[place] to call system/Physically safe environment - no spills, clutter or unnecessary equipment/Purposeful proactive rounding/Room/bathroom lighting operational, light cord in reach Bed/Stretcher in lowest position, wheels locked, appropriate side rails in place/Call bell, personal items and telephone in reach/Instruct patient to call for assistance before getting out of bed/chair/stretcher/Non-slip footwear applied when patient is off stretcher/[place] to call system/Physically safe environment - no spills, clutter or unnecessary equipment/Purposeful proactive rounding/Room/bathroom lighting operational, light cord in reach

## 2024-01-05 NOTE — ED PROVIDER NOTE - CARE PLAN
Principal Discharge DX:	Acute chest pain   1 Principal Discharge DX:	ACS (acute coronary syndrome)  Secondary Diagnosis:	Acute kidney injury superimposed on CKD

## 2024-01-05 NOTE — H&P ADULT - PROBLEM SELECTOR PLAN 5
On semglee 28u qhs and farxiga. A1C 6.6 on 11/25/23  - Will hold farxiga  - Start lantus 20u qhs  - Start low insulin sliding scale  - FS before meals and at bedtime  - CC DASH Diet  - Continue statin

## 2024-01-05 NOTE — H&P ADULT - NSHPSOCIALHISTORY_GEN_ALL_CORE
Social History:    Marital Status:   Occupation:   Lives with:    Substance Use (street drugs):   Tobacco Usage:   Alcohol Usage: Social History:    Marital Status:   Occupation:   Lives with:    Substance Use (street drugs): none  Tobacco Usage: none; former smoker when he was 21  Alcohol Usage: none

## 2024-01-05 NOTE — H&P ADULT - NSHPLABSRESULTS_GEN_ALL_CORE
01-05    143  |  103  |  45<H>  ----------------------------<  98  5.0   |  27  |  2.19<H>    Ca    10.7<H>      05 Jan 2024 16:58    TPro  7.4  /  Alb  4.7  /  TBili  2.3<H>  /  DBili  x   /  AST  33  /  ALT  23  /  AlkPhos  115  01-05          PT/INR - ( 05 Jan 2024 17:52 )   PT: 32.5 sec;   INR: 3.20 ratio         PTT - ( 05 Jan 2024 17:52 )  PTT:53.9 sec              Urinalysis Basic - ( 05 Jan 2024 16:58 )    Color: x / Appearance: x / SG: x / pH: x  Gluc: 98 mg/dL / Ketone: x  / Bili: x / Urobili: x   Blood: x / Protein: x / Nitrite: x   Leuk Esterase: x / RBC: x / WBC x   Sq Epi: x / Non Sq Epi: x / Bacteria: x                          16.6   8.53  )-----------( 188      ( 05 Jan 2024 16:58 )             48.5     CAPILLARY BLOOD GLUCOSE 01-05    143  |  103  |  45<H>  ----------------------------<  98  5.0   |  27  |  2.19<H>    Ca    10.7<H>      05 Jan 2024 16:58    TPro  7.4  /  Alb  4.7  /  TBili  2.3<H>  /  DBili  x   /  AST  33  /  ALT  23  /  AlkPhos  115  01-05      PT/INR - ( 05 Jan 2024 17:52 )   PT: 32.5 sec;   INR: 3.20 ratio         PTT - ( 05 Jan 2024 17:52 )  PTT:53.9 sec              Urinalysis Basic - ( 05 Jan 2024 16:58 )    Color: x / Appearance: x / SG: x / pH: x  Gluc: 98 mg/dL / Ketone: x  / Bili: x / Urobili: x   Blood: x / Protein: x / Nitrite: x   Leuk Esterase: x / RBC: x / WBC x   Sq Epi: x / Non Sq Epi: x / Bacteria: x                          16.6   8.53  )-----------( 188      ( 05 Jan 2024 16:58 )             48.5

## 2024-01-06 LAB
ALBUMIN SERPL ELPH-MCNC: 3.6 G/DL — SIGNIFICANT CHANGE UP (ref 3.3–5)
ALBUMIN SERPL ELPH-MCNC: 3.6 G/DL — SIGNIFICANT CHANGE UP (ref 3.3–5)
ALP SERPL-CCNC: 93 U/L — SIGNIFICANT CHANGE UP (ref 40–120)
ALP SERPL-CCNC: 93 U/L — SIGNIFICANT CHANGE UP (ref 40–120)
ALT FLD-CCNC: 17 U/L — SIGNIFICANT CHANGE UP (ref 10–45)
ALT FLD-CCNC: 17 U/L — SIGNIFICANT CHANGE UP (ref 10–45)
ANION GAP SERPL CALC-SCNC: 14 MMOL/L — SIGNIFICANT CHANGE UP (ref 5–17)
ANION GAP SERPL CALC-SCNC: 14 MMOL/L — SIGNIFICANT CHANGE UP (ref 5–17)
APPEARANCE UR: CLEAR — SIGNIFICANT CHANGE UP
APPEARANCE UR: CLEAR — SIGNIFICANT CHANGE UP
APTT BLD: 44.2 SEC — HIGH (ref 24.5–35.6)
APTT BLD: 44.2 SEC — HIGH (ref 24.5–35.6)
AST SERPL-CCNC: 24 U/L — SIGNIFICANT CHANGE UP (ref 10–40)
AST SERPL-CCNC: 24 U/L — SIGNIFICANT CHANGE UP (ref 10–40)
BACTERIA # UR AUTO: NEGATIVE /HPF — SIGNIFICANT CHANGE UP
BACTERIA # UR AUTO: NEGATIVE /HPF — SIGNIFICANT CHANGE UP
BILIRUB DIRECT SERPL-MCNC: 0.4 MG/DL — HIGH (ref 0–0.3)
BILIRUB DIRECT SERPL-MCNC: 0.4 MG/DL — HIGH (ref 0–0.3)
BILIRUB SERPL-MCNC: 1.8 MG/DL — HIGH (ref 0.2–1.2)
BILIRUB SERPL-MCNC: 1.8 MG/DL — HIGH (ref 0.2–1.2)
BILIRUB UR-MCNC: NEGATIVE — SIGNIFICANT CHANGE UP
BILIRUB UR-MCNC: NEGATIVE — SIGNIFICANT CHANGE UP
BUN SERPL-MCNC: 38 MG/DL — HIGH (ref 7–23)
BUN SERPL-MCNC: 38 MG/DL — HIGH (ref 7–23)
CALCIUM SERPL-MCNC: 9.3 MG/DL — SIGNIFICANT CHANGE UP (ref 8.4–10.5)
CALCIUM SERPL-MCNC: 9.3 MG/DL — SIGNIFICANT CHANGE UP (ref 8.4–10.5)
CALCIUM SERPL-MCNC: 9.5 MG/DL — SIGNIFICANT CHANGE UP (ref 8.4–10.5)
CALCIUM SERPL-MCNC: 9.5 MG/DL — SIGNIFICANT CHANGE UP (ref 8.4–10.5)
CAST: 0 /LPF — SIGNIFICANT CHANGE UP (ref 0–4)
CAST: 0 /LPF — SIGNIFICANT CHANGE UP (ref 0–4)
CHLORIDE SERPL-SCNC: 105 MMOL/L — SIGNIFICANT CHANGE UP (ref 96–108)
CHLORIDE SERPL-SCNC: 105 MMOL/L — SIGNIFICANT CHANGE UP (ref 96–108)
CO2 SERPL-SCNC: 24 MMOL/L — SIGNIFICANT CHANGE UP (ref 22–31)
CO2 SERPL-SCNC: 24 MMOL/L — SIGNIFICANT CHANGE UP (ref 22–31)
COLOR SPEC: YELLOW — SIGNIFICANT CHANGE UP
COLOR SPEC: YELLOW — SIGNIFICANT CHANGE UP
CREAT SERPL-MCNC: 1.98 MG/DL — HIGH (ref 0.5–1.3)
CREAT SERPL-MCNC: 1.98 MG/DL — HIGH (ref 0.5–1.3)
DIFF PNL FLD: NEGATIVE — SIGNIFICANT CHANGE UP
DIFF PNL FLD: NEGATIVE — SIGNIFICANT CHANGE UP
EGFR: 37 ML/MIN/1.73M2 — LOW
EGFR: 37 ML/MIN/1.73M2 — LOW
GLUCOSE BLDC GLUCOMTR-MCNC: 128 MG/DL — HIGH (ref 70–99)
GLUCOSE BLDC GLUCOMTR-MCNC: 128 MG/DL — HIGH (ref 70–99)
GLUCOSE BLDC GLUCOMTR-MCNC: 133 MG/DL — HIGH (ref 70–99)
GLUCOSE BLDC GLUCOMTR-MCNC: 133 MG/DL — HIGH (ref 70–99)
GLUCOSE BLDC GLUCOMTR-MCNC: 169 MG/DL — HIGH (ref 70–99)
GLUCOSE BLDC GLUCOMTR-MCNC: 169 MG/DL — HIGH (ref 70–99)
GLUCOSE BLDC GLUCOMTR-MCNC: 203 MG/DL — HIGH (ref 70–99)
GLUCOSE BLDC GLUCOMTR-MCNC: 203 MG/DL — HIGH (ref 70–99)
GLUCOSE BLDC GLUCOMTR-MCNC: 71 MG/DL — SIGNIFICANT CHANGE UP (ref 70–99)
GLUCOSE BLDC GLUCOMTR-MCNC: 71 MG/DL — SIGNIFICANT CHANGE UP (ref 70–99)
GLUCOSE SERPL-MCNC: 85 MG/DL — SIGNIFICANT CHANGE UP (ref 70–99)
GLUCOSE SERPL-MCNC: 85 MG/DL — SIGNIFICANT CHANGE UP (ref 70–99)
GLUCOSE UR QL: >=1000 MG/DL
GLUCOSE UR QL: >=1000 MG/DL
INR BLD: 2.71 RATIO — HIGH (ref 0.85–1.18)
INR BLD: 2.71 RATIO — HIGH (ref 0.85–1.18)
KETONES UR-MCNC: NEGATIVE MG/DL — SIGNIFICANT CHANGE UP
KETONES UR-MCNC: NEGATIVE MG/DL — SIGNIFICANT CHANGE UP
LEUKOCYTE ESTERASE UR-ACNC: NEGATIVE — SIGNIFICANT CHANGE UP
LEUKOCYTE ESTERASE UR-ACNC: NEGATIVE — SIGNIFICANT CHANGE UP
MAGNESIUM SERPL-MCNC: 2.4 MG/DL — SIGNIFICANT CHANGE UP (ref 1.6–2.6)
MAGNESIUM SERPL-MCNC: 2.4 MG/DL — SIGNIFICANT CHANGE UP (ref 1.6–2.6)
MRSA PCR RESULT.: SIGNIFICANT CHANGE UP
MRSA PCR RESULT.: SIGNIFICANT CHANGE UP
NITRITE UR-MCNC: NEGATIVE — SIGNIFICANT CHANGE UP
NITRITE UR-MCNC: NEGATIVE — SIGNIFICANT CHANGE UP
PH UR: 6 — SIGNIFICANT CHANGE UP (ref 5–8)
PH UR: 6 — SIGNIFICANT CHANGE UP (ref 5–8)
PHOSPHATE SERPL-MCNC: 3.5 MG/DL — SIGNIFICANT CHANGE UP (ref 2.5–4.5)
PHOSPHATE SERPL-MCNC: 3.5 MG/DL — SIGNIFICANT CHANGE UP (ref 2.5–4.5)
POTASSIUM SERPL-MCNC: 3.8 MMOL/L — SIGNIFICANT CHANGE UP (ref 3.5–5.3)
POTASSIUM SERPL-MCNC: 3.8 MMOL/L — SIGNIFICANT CHANGE UP (ref 3.5–5.3)
POTASSIUM SERPL-SCNC: 3.8 MMOL/L — SIGNIFICANT CHANGE UP (ref 3.5–5.3)
POTASSIUM SERPL-SCNC: 3.8 MMOL/L — SIGNIFICANT CHANGE UP (ref 3.5–5.3)
PROT SERPL-MCNC: 6 G/DL — SIGNIFICANT CHANGE UP (ref 6–8.3)
PROT SERPL-MCNC: 6 G/DL — SIGNIFICANT CHANGE UP (ref 6–8.3)
PROT UR-MCNC: NEGATIVE MG/DL — SIGNIFICANT CHANGE UP
PROT UR-MCNC: NEGATIVE MG/DL — SIGNIFICANT CHANGE UP
PROTHROM AB SERPL-ACNC: 27.6 SEC — HIGH (ref 9.5–13)
PROTHROM AB SERPL-ACNC: 27.6 SEC — HIGH (ref 9.5–13)
PTH-INTACT FLD-MCNC: 130 PG/ML — HIGH (ref 15–65)
PTH-INTACT FLD-MCNC: 130 PG/ML — HIGH (ref 15–65)
RBC CASTS # UR COMP ASSIST: 1 /HPF — SIGNIFICANT CHANGE UP (ref 0–4)
RBC CASTS # UR COMP ASSIST: 1 /HPF — SIGNIFICANT CHANGE UP (ref 0–4)
S AUREUS DNA NOSE QL NAA+PROBE: SIGNIFICANT CHANGE UP
S AUREUS DNA NOSE QL NAA+PROBE: SIGNIFICANT CHANGE UP
SODIUM SERPL-SCNC: 143 MMOL/L — SIGNIFICANT CHANGE UP (ref 135–145)
SODIUM SERPL-SCNC: 143 MMOL/L — SIGNIFICANT CHANGE UP (ref 135–145)
SP GR SPEC: 1.02 — SIGNIFICANT CHANGE UP (ref 1–1.03)
SP GR SPEC: 1.02 — SIGNIFICANT CHANGE UP (ref 1–1.03)
SQUAMOUS # UR AUTO: 0 /HPF — SIGNIFICANT CHANGE UP (ref 0–5)
SQUAMOUS # UR AUTO: 0 /HPF — SIGNIFICANT CHANGE UP (ref 0–5)
TROPONIN T, HIGH SENSITIVITY RESULT: 115 NG/L — HIGH (ref 0–51)
TROPONIN T, HIGH SENSITIVITY RESULT: 115 NG/L — HIGH (ref 0–51)
TROPONIN T, HIGH SENSITIVITY RESULT: 138 NG/L — HIGH (ref 0–51)
TROPONIN T, HIGH SENSITIVITY RESULT: 138 NG/L — HIGH (ref 0–51)
TROPONIN T, HIGH SENSITIVITY RESULT: 165 NG/L — HIGH (ref 0–51)
TROPONIN T, HIGH SENSITIVITY RESULT: 165 NG/L — HIGH (ref 0–51)
TROPONIN T, HIGH SENSITIVITY RESULT: 180 NG/L — HIGH (ref 0–51)
TROPONIN T, HIGH SENSITIVITY RESULT: 180 NG/L — HIGH (ref 0–51)
TSH SERPL-MCNC: 3.48 UIU/ML — SIGNIFICANT CHANGE UP (ref 0.27–4.2)
TSH SERPL-MCNC: 3.48 UIU/ML — SIGNIFICANT CHANGE UP (ref 0.27–4.2)
UROBILINOGEN FLD QL: 1 MG/DL — SIGNIFICANT CHANGE UP (ref 0.2–1)
UROBILINOGEN FLD QL: 1 MG/DL — SIGNIFICANT CHANGE UP (ref 0.2–1)
WBC UR QL: 1 /HPF — SIGNIFICANT CHANGE UP (ref 0–5)
WBC UR QL: 1 /HPF — SIGNIFICANT CHANGE UP (ref 0–5)

## 2024-01-06 PROCEDURE — 93010 ELECTROCARDIOGRAM REPORT: CPT

## 2024-01-06 RX ORDER — INSULIN GLARGINE 100 [IU]/ML
10 INJECTION, SOLUTION SUBCUTANEOUS ONCE
Refills: 0 | Status: COMPLETED | OUTPATIENT
Start: 2024-01-06 | End: 2024-01-06

## 2024-01-06 RX ORDER — CHLORHEXIDINE GLUCONATE 213 G/1000ML
1 SOLUTION TOPICAL DAILY
Refills: 0 | Status: DISCONTINUED | OUTPATIENT
Start: 2024-01-06 | End: 2024-01-09

## 2024-01-06 RX ORDER — INFLUENZA VIRUS VACCINE 15; 15; 15; 15 UG/.5ML; UG/.5ML; UG/.5ML; UG/.5ML
0.7 SUSPENSION INTRAMUSCULAR ONCE
Refills: 0 | Status: DISCONTINUED | OUTPATIENT
Start: 2024-01-06 | End: 2024-01-09

## 2024-01-06 RX ADMIN — Medication 50 MILLIGRAM(S): at 05:24

## 2024-01-06 RX ADMIN — INSULIN GLARGINE 20 UNIT(S): 100 INJECTION, SOLUTION SUBCUTANEOUS at 21:08

## 2024-01-06 RX ADMIN — RIVAROXABAN 15 MILLIGRAM(S): KIT at 17:02

## 2024-01-06 RX ADMIN — Medication 50 MILLIGRAM(S): at 17:01

## 2024-01-06 RX ADMIN — INSULIN GLARGINE 10 UNIT(S): 100 INJECTION, SOLUTION SUBCUTANEOUS at 02:19

## 2024-01-06 RX ADMIN — CLOPIDOGREL BISULFATE 75 MILLIGRAM(S): 75 TABLET, FILM COATED ORAL at 11:03

## 2024-01-06 RX ADMIN — CHLORHEXIDINE GLUCONATE 1 APPLICATION(S): 213 SOLUTION TOPICAL at 11:03

## 2024-01-06 RX ADMIN — ATORVASTATIN CALCIUM 80 MILLIGRAM(S): 80 TABLET, FILM COATED ORAL at 21:09

## 2024-01-06 NOTE — PATIENT PROFILE ADULT - FALL HARM RISK - HARM RISK INTERVENTIONS
Assistance with ambulation/Assistance OOB with selected safe patient handling equipment/Communicate Risk of Fall with Harm to all staff/Discuss with provider need for PT consult/Monitor gait and stability/Reinforce activity limits and safety measures with patient and family/Tailored Fall Risk Interventions/Visual Cue: Yellow wristband and red socks/Bed in lowest position, wheels locked, appropriate side rails in place/Call bell, personal items and telephone in reach/Instruct patient to call for assistance before getting out of bed or chair/Non-slip footwear when patient is out of bed/[place] to call system/Physically safe environment - no spills, clutter or unnecessary equipment/Purposeful Proactive Rounding/Room/bathroom lighting operational, light cord in reach Assistance with ambulation/Assistance OOB with selected safe patient handling equipment/Communicate Risk of Fall with Harm to all staff/Discuss with provider need for PT consult/Monitor gait and stability/Reinforce activity limits and safety measures with patient and family/Tailored Fall Risk Interventions/Visual Cue: Yellow wristband and red socks/Bed in lowest position, wheels locked, appropriate side rails in place/Call bell, personal items and telephone in reach/Instruct patient to call for assistance before getting out of bed or chair/Non-slip footwear when patient is out of bed/[place] to call system/Physically safe environment - no spills, clutter or unnecessary equipment/Purposeful Proactive Rounding/Room/bathroom lighting operational, light cord in reach

## 2024-01-06 NOTE — CONSULT NOTE ADULT - ASSESSMENT
68-year-old male PMH of CAD with 6 stents (most recent TITO to ostial LM on 6/2/23, HFrEF (EF~50%, 11/26/23), A-fib on Xarelto, HTN, HLD, T2DM, CKD3, former smoker, presenting with typical anginal symptoms.  Discussed with Dr. Lopez last night who decided against emergent cardiac cath due to his elevated INR and increased Cr.    Wait til INR decreases to about 2.   Recommend renal consult to protect renal function. ARNi and lasix on hold for now.  Preparing for cardiac cath on 1/8/24  Hold Xarelto on 1/7/24 once cardiac cath is confirmed by invasive cardiology and renal agrees.  AF controlled.  Troponin elevated, no real curve, would continue to obtain today to see if it is coming down.

## 2024-01-06 NOTE — CHART NOTE - NSCHARTNOTEFT_GEN_A_CORE
Medicine NP Episodic Note     Notified by RN, pt. had episode of CP at 23:47 relieved by Nitro SL x 1.  Pt. seen and examined at bedside.  Verbalized that CP was non-radiating, "achy," 4/10 at worst; relived "immediately" by SL Nitro.  Denies any other specific complaints.  No SOB, palliations, diaphoresis, N/V or abdominal pain.       Vital Signs Last 24 Hrs  T(C): 37 (05 Jan 2024 19:25), Max: 37 (05 Jan 2024 19:25)  T(F): 98.6 (05 Jan 2024 19:25), Max: 98.6 (05 Jan 2024 19:25)  HR: 78 (05 Jan 2024 19:25) (59 - 78)  BP: 146/76 (05 Jan 2024 19:25) (146/76 - 175/95)  BP(mean): --  RR: 18 (05 Jan 2024 19:25) (18 - 20)  SpO2: 99% (05 Jan 2024 19:25) (99% - 99%)    Parameters below as of 05 Jan 2024 19:25  Patient On (Oxygen Delivery Method): room air      Labs:                          16.6   8.53  )-----------( 188      ( 05 Jan 2024 16:58 )             48.5     01-05    143  |  103  |  45<H>  ----------------------------<  98  5.0   |  27  |  2.19<H>    Ca    10.7<H>      05 Jan 2024 16:58    TPro  7.4  /  Alb  4.7  /  TBili  2.3<H>  /  DBili  x   /  AST  33  /  ALT  23  /  AlkPhos  115  01-05      CARDIAC MARKERS ( 05 Jan 2024 20:59 )  x     / x     / 144 U/L / x     / 8.4 ng/mL  CARDIAC MARKERS ( 05 Jan 2024 16:58 )  x     / x     / x     / x     / 10.6 ng/mL      Troponin T, High Sensitivity Result: 231: *  Troponin T, High Sensitivity Result: 209: *      Radiology:  < from: Xray Chest 1 View- PORTABLE-Urgent (01.05.24 @ 17:16) >  PRLIM INTERPRETATION:  clear lungs        MEDICATIONS  (STANDING):  atorvastatin 80 milliGRAM(s) Oral at bedtime  clopidogrel Tablet 75 milliGRAM(s) Oral daily  dextrose 5%. 1000 milliLiter(s) (50 mL/Hr) IV Continuous <Continuous>  dextrose 5%. 1000 milliLiter(s) (100 mL/Hr) IV Continuous <Continuous>  dextrose 50% Injectable 25 Gram(s) IV Push once  dextrose 50% Injectable 25 Gram(s) IV Push once  dextrose 50% Injectable 12.5 Gram(s) IV Push once  glucagon  Injectable 1 milliGRAM(s) IntraMuscular once  insulin glargine Injectable (LANTUS) 20 Unit(s) SubCutaneous at bedtime  insulin lispro (ADMELOG) corrective regimen sliding scale   SubCutaneous at bedtime  insulin lispro (ADMELOG) corrective regimen sliding scale   SubCutaneous three times a day before meals  metoprolol tartrate 50 milliGRAM(s) Oral two times a day  rivaroxaban 15 milliGRAM(s) Oral with dinner    MEDICATIONS  (PRN):  acetaminophen     Tablet .. 650 milliGRAM(s) Oral every 6 hours PRN Temp greater or equal to 38C (100.4F), Moderate Pain (4 - 6)  dextrose Oral Gel 15 Gram(s) Oral once PRN Blood Glucose LESS THAN 70 milliGRAM(s)/deciliter  nitroglycerin     SubLingual 0.4 milliGRAM(s) SubLingual every 5 minutes PRN Chest Pain      Physical Exam:  Gen/Neuro: A+Ox3, in NAD  Respiratory: CTAB, resp. even and non-labored bilat   CV: S1S2, irregularly, irregular   Abdominal: Soft, NT/ND. no palpable mass  MSK: No edema, + peripheral pulses, FROM all 4 extremity      Assessment & Plan:  HPI:  68M w/ PMHx of CAD w/ 6 stents (most recent TITO to ostial LM on 6/2/23, HFrEF (EF~50%, 11/26/23), AFib on Xarelto, HTN, HLD, T2DM, CKD3, former smoker, presents to ED c/o progressively worsening exertional CP over the past 2 wks culminating in an episode of CP at rest last night which was relieved after taking Nitro. Last admission 11/25-11/29 for NSTEMI s/p POBA to Cx and pLAD. Pt w/ several episodes of CP recently; most recent episode last night around 10pm. Described as sharp pain across center of chest. No radiation to back or down arms; similar to the previous episodes of CP. Pt took a Nitro which immediately resolves the pain. Pt also took ASA once for his CP. He is currently on Plavix and Xarelto (NOT ASA) following his last hospitalization. No other symptoms besides CP.  In ED, EKG showing Afib w/ signs of acute ischemia.  Cardiology called w/ no plans for emergent cath. Pt received 324mg of ASA in ED.  Now p/w CP, similar to ED presentation, relieved by SL Nitro.     # Recurrent CP  > s/p SL Nitro x 1 w/ relief; c/w SL Nitro PRN  > Stat EKG - Afib w/ HR 85bpm, no acute changes from prior   > Stat trop; continue to trend   > Continue Plavix, BB, statin    > Continue tele   > Monitor vital signs   > Cardiology following     Will endorse to primary team.  Attending to follow.    Shelbie Lee, SRAVAN-BC  [phone] Medicine NP Episodic Note     Notified by RN, pt. had episode of CP at 23:47 relieved by Nitro SL x 1.  Pt. seen and examined at bedside.  Verbalized that CP was non-radiating, "achy," 4/10 at worst; relived "immediately" by SL Nitro.  Denies any other specific complaints.  No SOB, palliations, diaphoresis, N/V or abdominal pain.       Vital Signs Last 24 Hrs  T(C): 37 (05 Jan 2024 19:25), Max: 37 (05 Jan 2024 19:25)  T(F): 98.6 (05 Jan 2024 19:25), Max: 98.6 (05 Jan 2024 19:25)  HR: 78 (05 Jan 2024 19:25) (59 - 78)  BP: 146/76 (05 Jan 2024 19:25) (146/76 - 175/95)  BP(mean): --  RR: 18 (05 Jan 2024 19:25) (18 - 20)  SpO2: 99% (05 Jan 2024 19:25) (99% - 99%)    Parameters below as of 05 Jan 2024 19:25  Patient On (Oxygen Delivery Method): room air      Labs:                          16.6   8.53  )-----------( 188      ( 05 Jan 2024 16:58 )             48.5     01-05    143  |  103  |  45<H>  ----------------------------<  98  5.0   |  27  |  2.19<H>    Ca    10.7<H>      05 Jan 2024 16:58    TPro  7.4  /  Alb  4.7  /  TBili  2.3<H>  /  DBili  x   /  AST  33  /  ALT  23  /  AlkPhos  115  01-05      CARDIAC MARKERS ( 05 Jan 2024 20:59 )  x     / x     / 144 U/L / x     / 8.4 ng/mL  CARDIAC MARKERS ( 05 Jan 2024 16:58 )  x     / x     / x     / x     / 10.6 ng/mL      Troponin T, High Sensitivity Result: 231: *  Troponin T, High Sensitivity Result: 209: *      Radiology:  < from: Xray Chest 1 View- PORTABLE-Urgent (01.05.24 @ 17:16) >  PRLIM INTERPRETATION:  clear lungs        MEDICATIONS  (STANDING):  atorvastatin 80 milliGRAM(s) Oral at bedtime  clopidogrel Tablet 75 milliGRAM(s) Oral daily  dextrose 5%. 1000 milliLiter(s) (50 mL/Hr) IV Continuous <Continuous>  dextrose 5%. 1000 milliLiter(s) (100 mL/Hr) IV Continuous <Continuous>  dextrose 50% Injectable 25 Gram(s) IV Push once  dextrose 50% Injectable 25 Gram(s) IV Push once  dextrose 50% Injectable 12.5 Gram(s) IV Push once  glucagon  Injectable 1 milliGRAM(s) IntraMuscular once  insulin glargine Injectable (LANTUS) 20 Unit(s) SubCutaneous at bedtime  insulin lispro (ADMELOG) corrective regimen sliding scale   SubCutaneous at bedtime  insulin lispro (ADMELOG) corrective regimen sliding scale   SubCutaneous three times a day before meals  metoprolol tartrate 50 milliGRAM(s) Oral two times a day  rivaroxaban 15 milliGRAM(s) Oral with dinner    MEDICATIONS  (PRN):  acetaminophen     Tablet .. 650 milliGRAM(s) Oral every 6 hours PRN Temp greater or equal to 38C (100.4F), Moderate Pain (4 - 6)  dextrose Oral Gel 15 Gram(s) Oral once PRN Blood Glucose LESS THAN 70 milliGRAM(s)/deciliter  nitroglycerin     SubLingual 0.4 milliGRAM(s) SubLingual every 5 minutes PRN Chest Pain      Physical Exam:  Gen/Neuro: A+Ox3, in NAD  Respiratory: CTAB, resp. even and non-labored bilat   CV: S1S2, irregularly, irregular   Abdominal: Soft, NT/ND. no palpable mass  MSK: No edema, + peripheral pulses, FROM all 4 extremity      Assessment & Plan:  HPI:  68M w/ PMHx of CAD w/ 6 stents (most recent TITO to ostial LM on 6/2/23, HFrEF (EF~50%, 11/26/23), AFib on Xarelto, HTN, HLD, T2DM, CKD3, former smoker, presents to ED c/o progressively worsening exertional CP over the past 2 wks culminating in an episode of CP at rest last night which was relieved after taking Nitro. Last admission 11/25-11/29 for NSTEMI s/p POBA to Cx and pLAD. Pt w/ several episodes of CP recently; most recent episode last night around 10pm. Described as sharp pain across center of chest. No radiation to back or down arms; similar to the previous episodes of CP. Pt took a Nitro which immediately resolves the pain. Pt also took ASA once for his CP. He is currently on Plavix and Xarelto (NOT ASA) following his last hospitalization. No other symptoms besides CP.  In ED, EKG showing Afib w/ signs of acute ischemia.  Cardiology called w/ no plans for emergent cath. Pt received 324mg of ASA in ED.  Now p/w CP, similar to ED presentation, relieved by SL Nitro.     # Recurrent CP  > s/p SL Nitro x 1 w/ relief; c/w SL Nitro PRN  > Stat EKG - Afib w/ HR 85bpm, no acute changes from prior   > Stat trop; continue to trend   > Continue Plavix, BB, statin    > Continue tele   > Monitor vital signs   > Cardiology following     Will endorse to primary team.  Attending to follow.    Shelbie Lee, SRAVAN-BC  [phone]

## 2024-01-06 NOTE — PATIENT PROFILE ADULT - FUNCTIONAL ASSESSMENT - BASIC MOBILITY 3.
Learning About Diabetes Food Guidelines  Your Care Instructions    Meal planning is important to manage diabetes. It helps keep your blood sugar at a target level (which you set with your doctor). You don't have to eat special foods. You can eat what your family eats, including sweets once in a while. But you do have to pay attention to how often you eat and how much you eat of certain foods. You may want to work with a dietitian or a certified diabetes educator (CDE) to help you plan meals and snacks. A dietitian or CDE can also help you lose weight if that is one of your goals. What should you know about eating carbs? Managing the amount of carbohydrate (carbs) you eat is an important part of healthy meals when you have diabetes. Carbohydrate is found in many foods. · Learn which foods have carbs. And learn the amounts of carbs in different foods. ¨ Bread, cereal, pasta, and rice have about 15 grams of carbs in a serving. A serving is 1 slice of bread (1 ounce), ½ cup of cooked cereal, or 1/3 cup of cooked pasta or rice. ¨ Fruits have 15 grams of carbs in a serving. A serving is 1 small fresh fruit, such as an apple or orange; ½ of a banana; ½ cup of cooked or canned fruit; ½ cup of fruit juice; 1 cup of melon or raspberries; or 2 tablespoons of dried fruit. ¨ Milk and no-sugar-added yogurt have 15 grams of carbs in a serving. A serving is 1 cup of milk or 2/3 cup of no-sugar-added yogurt. ¨ Starchy vegetables have 15 grams of carbs in a serving. A serving is ½ cup of mashed potatoes or sweet potato; 1 cup winter squash; ½ of a small baked potato; ½ cup of cooked beans; or ½ cup cooked corn or green peas. · Learn how much carbs to eat each day and at each meal. A dietitian or CDE can teach you how to keep track of the amount of carbs you eat. This is called carbohydrate counting. · If you are not sure how to count carbohydrate grams, use the Plate Method to plan meals.  It is a good, quick way to make sure that you have a balanced meal. It also helps you spread carbs throughout the day. ¨ Divide your plate by types of foods. Put non-starchy vegetables on half the plate, meat or other protein food on one-quarter of the plate, and a grain or starchy vegetable in the final quarter of the plate. To this you can add a small piece of fruit and 1 cup of milk or yogurt, depending on how many carbs you are supposed to eat at a meal.  · Try to eat about the same amount of carbs at each meal. Do not \"save up\" your daily allowance of carbs to eat at one meal.  · Proteins have very little or no carbs per serving. Examples of proteins are beef, chicken, turkey, fish, eggs, tofu, cheese, cottage cheese, and peanut butter. A serving size of meat is 3 ounces, which is about the size of a deck of cards. Examples of meat substitute serving sizes (equal to 1 ounce of meat) are 1/4 cup of cottage cheese, 1 egg, 1 tablespoon of peanut butter, and ½ cup of tofu. How can you eat out and still eat healthy? · Learn to estimate the serving sizes of foods that have carbohydrate. If you measure food at home, it will be easier to estimate the amount in a serving of restaurant food. · If the meal you order has too much carbohydrate (such as potatoes, corn, or baked beans), ask to have a low-carbohydrate food instead. Ask for a salad or green vegetables. · If you use insulin, check your blood sugar before and after eating out to help you plan how much to eat in the future. · If you eat more carbohydrate at a meal than you had planned, take a walk or do other exercise. This will help lower your blood sugar. What else should you know? · Limit saturated fat, such as the fat from meat and dairy products. This is a healthy choice because people who have diabetes are at higher risk of heart disease. So choose lean cuts of meat and nonfat or low-fat dairy products.  Use olive or canola oil instead of butter or shortening when cooking. · Don't skip meals. Your blood sugar may drop too low if you skip meals and take insulin or certain medicines for diabetes. · Check with your doctor before you drink alcohol. Alcohol can cause your blood sugar to drop too low. Alcohol can also cause a bad reaction if you take certain diabetes medicines. Follow-up care is a key part of your treatment and safety. Be sure to make and go to all appointments, and call your doctor if you are having problems. It's also a good idea to know your test results and keep a list of the medicines you take. Where can you learn more? Go to http://george-nisha.info/. Enter C949 in the search box to learn more about \"Learning About Diabetes Food Guidelines. \"  Current as of: March 13, 2017  Content Version: 11.4  © 4487-1915 [employer]. Care instructions adapted under license by [employer] (which disclaims liability or warranty for this information). If you have questions about a medical condition or this instruction, always ask your healthcare professional. [address] any warranty or liability for your use of this information. Body Mass Index: Care Instructions  Your Care Instructions    Body mass index (BMI) can help you see if your weight is raising your risk for health problems. It uses a formula to compare how much you weigh with how tall you are. · A BMI lower than 18.5 is considered underweight. · A BMI between 18.5 and 24.9 is considered healthy. · A BMI between 25 and 29.9 is considered overweight. A BMI of 30 or higher is considered obese. If your BMI is in the normal range, it means that you have a lower risk for weight-related health problems. If your BMI is in the overweight or obese range, you may be at increased risk for weight-related health problems, such as high blood pressure, heart disease, stroke, arthritis or joint pain, and diabetes.  If your BMI is in the underweight range, you may be at increased risk for health problems such as fatigue, lower protection (immunity) against illness, muscle loss, bone loss, hair loss, and hormone problems. BMI is just one measure of your risk for weight-related health problems. You may be at higher risk for health problems if you are not active, you eat an unhealthy diet, or you drink too much alcohol or use tobacco products. Follow-up care is a key part of your treatment and safety. Be sure to make and go to all appointments, and call your doctor if you are having problems. It's also a good idea to know your test results and keep a list of the medicines you take. How can you care for yourself at home? · Practice healthy eating habits. This includes eating plenty of fruits, vegetables, whole grains, lean protein, and low-fat dairy. · If your doctor recommends it, get more exercise. Walking is a good choice. Bit by bit, increase the amount you walk every day. Try for at least 30 minutes on most days of the week. · Do not smoke. Smoking can increase your risk for health problems. If you need help quitting, talk to your doctor about stop-smoking programs and medicines. These can increase your chances of quitting for good. · Limit alcohol to 2 drinks a day for men and 1 drink a day for women. Too much alcohol can cause health problems. If you have a BMI higher than 25  · Your doctor may do other tests to check your risk for weight-related health problems. This may include measuring the distance around your waist. A waist measurement of more than 40 inches in men or 35 inches in women can increase the risk of weight-related health problems. · Talk with your doctor about steps you can take to stay healthy or improve your health. You may need to make lifestyle changes to lose weight and stay healthy, such as changing your diet and getting regular exercise.   If you have a BMI lower than 18.5  · Your doctor may do other tests to check your risk for health problems. · Talk with your doctor about steps you can take to stay healthy or improve your health. You may need to make lifestyle changes to gain or maintain weight and stay healthy, such as getting more healthy foods in your diet and doing exercises to build muscle. Where can you learn more? Go to http://george-nisha.info/. Enter S176 in the search box to learn more about \"Body Mass Index: Care Instructions. \"  Current as of: October 13, 2016  Content Version: 11.4  © 6900-7141 [employer]. Care instructions adapted under license by [employer] (which disclaims liability or warranty for this information). If you have questions about a medical condition or this instruction, always ask your healthcare professional. [address] any warranty or liability for your use of this information. 4 = No assist / stand by assistance

## 2024-01-06 NOTE — CHART NOTE - NSCHARTNOTEFT_GEN_A_CORE
Medicine NP Episodic Note    Notified by RN at 00:50, pt. had episode hypoglycemia last night, POCT results as follows:   - POCT  Blood Glucose 50 mg/dL (01.05.24 @ 22:16)  - POCT Blood Glucose.: 57 mg/dL (01.05.24 @ 22:17)    Per RN hypoglycemic protocol was initiated; pt. was given apple juice and pt. did not want to eat anything else at that time.   - Repeat POCT  Blood Glucose 104 mg/dL (01.05.24 @ 23:37)    RN now questioning HS dose of Lantus 20U is to be administered.    Pt. seen and examined at bedside, A+Ox3, in NAD.  Denies any specific complaints at present time. No dizziness/lightheadedness/diaphoresis.  Pt. endorsed that he dis not eat lunch or dinner.  Pt. admits to not feeling hungry at present time.      # Hypoglycemia   > Will hold Lantus 20U at this time   > Will give Lantus 10U x 1 now  > Monitor closely for s/s of hypoglycemia / hyperglycemia   > Continue FS Q AC/HS  > F/u am labs     Will endorse to primary team in am.  Attending to follow.    Shelbie Lee, [facility]  [phone] Medicine NP Episodic Note    Notified by RN at 00:50, pt. had episode hypoglycemia last night, POCT results as follows:   - POCT  Blood Glucose 50 mg/dL (01.05.24 @ 22:16)  - POCT Blood Glucose.: 57 mg/dL (01.05.24 @ 22:17)    Per RN hypoglycemic protocol was initiated; pt. was given apple juice and pt. did not want to eat anything else at that time.   - Repeat POCT  Blood Glucose 104 mg/dL (01.05.24 @ 23:37)    RN now questioning HS dose of Lantus 20U is to be administered.    Pt. seen and examined at bedside, A+Ox3, in NAD.  Denies any specific complaints at present time. No dizziness/lightheadedness/diaphoresis.  Pt. endorsed that he dis not eat lunch or dinner.  Pt. admits to not feeling hungry at present time.      # Hypoglycemia   > Will hold Lantus 20U at this time   > Will give Lantus 10U x 1 now  > Monitor closely for s/s of hypoglycemia / hyperglycemia   > Continue FS Q AC/HS  > F/u am labs     Will endorse to primary team in am.  Attending to follow.    Shelbie Lee, [facility]  [phone] Medicine NP Episodic Note    Notified by RN at 00:50, pt. had episode of hypoglycemia last night, POCT results as follows:   - POCT  Blood Glucose 50 mg/dL (01.05.24 @ 22:16)  - POCT Blood Glucose.: 57 mg/dL (01.05.24 @ 22:17)    Per RN hypoglycemic protocol was initiated; pt. was given apple juice and pt. did not want to eat anything else at that time.   - Repeat POCT  Blood Glucose 104 mg/dL (01.05.24 @ 23:37)    RN now questioning if HS dose of Lantus 20U is to be administered.    Pt. seen and examined at bedside, A+Ox3, in NAD.  Denies any specific complaints at present time. No dizziness/lightheadedness/diaphoresis.  Pt. endorsed that he  not eat lunch or dinner.  Pt. admits to not feeling hungry at present time.      # Hypoglycemia   > Will hold Lantus 20U at this time   > Will give Lantus 10U x 1 now  > Monitor closely for s/s of hypoglycemia / hyperglycemia   > Continue FS Q AC/HS  > F/u am labs     Will endorse to primary team in am.  Attending to follow.    Shelbie Lee, [facility]  [phone] Medicine NP Episodic Note    Notified by RN at 00:50, pt. had episode of hypoglycemia last night, POCT results as follows:   - POCT  Blood Glucose 50 mg/dL (01.05.24 @ 22:16)  - POCT Blood Glucose.: 57 mg/dL (01.05.24 @ 22:17)    Per RN hypoglycemic protocol was initiated; pt. was given apple juice and pt. did not want to eat anything else at that time.   - Repeat POCT  Blood Glucose 104 mg/dL (01.05.24 @ 23:37)    RN now questioning if HS dose of Lantus 20U is to be administered.    Pt. seen and examined at bedside, A+Ox3, in NAD.  Denies any specific complaints at present time. No dizziness/lightheadedness/diaphoresis.  Pt. endorsed that he  not eat lunch or dinner.  Pt. admits to not feeling hungry at present time.      # Hypoglycemia   > Will hold Lantus 20U at this time   > Will give Lantus 10U x 1 now  > Monitor closely for s/s of hypoglycemia / hyperglycemia   > Continue FS Q AC/HS  > F/u am labs     Will endorse to primary team in am.  Attending to follow.    Shelbie Lee, [facility]  [phone]

## 2024-01-06 NOTE — CONSULT NOTE ADULT - SUBJECTIVE AND OBJECTIVE BOX
CHIEF COMPLAINT: Chest Pain    HPI:  68-year-old male PMH of CAD with 6 stents (most recent TITO to ostial LM on 23, HFrEF (EF~50%, 23), A-fib on Xarelto, HTN, HLD, T2DM, CKD3, former smoker, presents to ED complaining of progressively worsening exertional chest pain over the past 2 weeks culminating in an episode of chest pain at rest last night which was relieved after taking nitroglycerin. Last admission - for NSTEMI s/p POBA to Cx and pLAD. Pt with several episodes of chest pain recently; most recent episodeovernight relieved with SLNTG. Described as sharp pain across center of chest. No radiation to back or down arms; similar to the previous episodes of chest pain. Pt took a nitroglycerin which immediately resolves the pain. Pt also took ASA once for his chest pain. He is currently on plavix and xarelto (NOT aspirin) following his last hospitalization. No other symptoms besides chest pain. In ED, EKG showing afib without signs of acute ischemia. C Pt received 324mg of ASA in ED.   (2024 20:37)  Reports that chest pain is brought on by large meals and exertion.    PAST MEDICAL & SURGICAL HISTORY:  Former smoker      CAD in native artery      Type 2 diabetes mellitus      Hyperlipidemia, unspecified hyperlipidemia type      Essential hypertension, hypertension with unspecified goal      Afib      Hematoma of leg      Stented coronary artery      CHF (congestive heart failure)      s/p Carotid Endarterectomy  left          Allergies    No Known Allergies    Intolerances        SOCIAL HISTORY    Smoking Hx: None  ETOH Hx: None  Marital Status:   Occupational Hx:     FAMILY HISTORY:  Family history of heart disease  father  age 71    FH: atrial fibrillation  sister        MEDICATIONS:  acetaminophen     Tablet .. 650 milliGRAM(s) Oral every 6 hours PRN  atorvastatin 80 milliGRAM(s) Oral at bedtime  chlorhexidine 2% Cloths 1 Application(s) Topical daily  clopidogrel Tablet 75 milliGRAM(s) Oral daily  dextrose 5%. 1000 milliLiter(s) IV Continuous <Continuous>  dextrose 5%. 1000 milliLiter(s) IV Continuous <Continuous>  dextrose 50% Injectable 12.5 Gram(s) IV Push once  dextrose 50% Injectable 25 Gram(s) IV Push once  dextrose 50% Injectable 25 Gram(s) IV Push once  dextrose Oral Gel 15 Gram(s) Oral once PRN  glucagon  Injectable 1 milliGRAM(s) IntraMuscular once  influenza  Vaccine (HIGH DOSE) 0.7 milliLiter(s) IntraMuscular once  insulin glargine Injectable (LANTUS) 20 Unit(s) SubCutaneous at bedtime  insulin lispro (ADMELOG) corrective regimen sliding scale   SubCutaneous at bedtime  insulin lispro (ADMELOG) corrective regimen sliding scale   SubCutaneous three times a day before meals  metoprolol tartrate 50 milliGRAM(s) Oral two times a day  nitroglycerin     SubLingual 0.4 milliGRAM(s) SubLingual every 5 minutes PRN  rivaroxaban 15 milliGRAM(s) Oral with dinner      REVIEW OF SYSTEMS:    CONSTITUTIONAL: No weakness, fevers or chills  EYES/ENT: No visual changes;  No vertigo or throat pain   NECK: No pain or stiffness  RESPIRATORY: No cough, wheezing, hemoptysis; No shortness of breath  CARDIOVASCULAR: No chest pain or palpitations  GASTROINTESTINAL: No abdominal or epigastric pain. No nausea, vomiting, or hematemesis; No diarrhea or constipation. No melena or hematochezia.  GENITOURINARY: No dysuria, frequency or hematuria  NEUROLOGICAL: No numbness or weakness  SKIN: No itching, burning, rashes, or lesions   All other review of systems is negative unless indicated above    Vital Signs Last 24 Hrs  T(C): 36.3 (2024 04:35), Max: 37 (2024 19:25)  T(F): 97.4 (2024 04:35), Max: 98.6 (2024 19:25)  HR: 57 (2024 05:20) (56 - 94)  BP: 113/56 (2024 05:20) (113/56 - 175/95)  BP(mean): --  RR: 18 (2024 05:20) (18 - 20)  SpO2: 97% (2024 05:20) (96% - 99%)    Parameters below as of 2024 05:20  Patient On (Oxygen Delivery Method): room air        I&O's Summary      PHYSICAL EXAM:    Constitutional: NAD, awake and alert, well-developed  HEENT: PERR, EOMI  Neck: soft and supple, No LAD, No JVD  Respiratory: Breath sounds are clear bilaterally, No wheezing, rales or rhonchi  Cardiovascular: Regular rate and rhythm, normal S1 and S2,  no murmurs, gallops or rubs  Gastrointestinal: Bowel Sounds present, soft, nontender.   Extremities: No peripheral edema. No clubbing or cyanosis.  Vascular: 2+ peripheral pulses  Neurological: A/O x 3, no focal deficits  Musculoskeletal: no calf tenderness.  Skin: No rashes.      LABS: All Labs Reviewed:                        16.6   8.53  )-----------( 188      ( 2024 16:58 )             48.5     2024 06:58    143    |  105    |  38     ----------------------------<  85     3.8     |  24     |  1.98   2024 16:58    143    |  103    |  45     ----------------------------<  98     5.0     |  27     |  2.19     Ca    9.5        2024 06:58  Ca    10.7       2024 16:58  Phos  3.5       2024 06:58  Mg     2.4       2024 06:58    TPro  6.0    /  Alb  3.6    /  TBili  1.8    /  DBili  0.4    /  AST  24     /  ALT  17     /  AlkPhos  93     2024 06:58  TPro  7.4    /  Alb  4.7    /  TBili  2.3    /  DBili  x      /  AST  33     /  ALT  23     /  AlkPhos  115    2024 16:58    PT/INR - ( 2024 06:58 )   PT: 27.6 sec;   INR: 2.71 ratio         PTT - ( 2024 06:58 )  PTT:44.2 sec  Blood Culture:     CARDIAC MARKERS:    Troponin T,  serum   180 ( @ 06:58)  165 ( @ 00:46)  209 ( @ 20:59)  231 ( @ 16:58)                  RADIOLOGY/EKG: Atrial fibrillation with moderate ventricular response

## 2024-01-06 NOTE — PROGRESS NOTE ADULT - SUBJECTIVE AND OBJECTIVE BOX
Date of service: 01-06-24 @ 21:46      Patient is a 68y old  Male who presents with a chief complaint of Chest pain (06 Jan 2024 08:26)                                                               INTERVAL HPI/OVERNIGHT EVENTS:    REVIEW OF SYSTEMS:   no cp /sob                                                                                                                                                                                                                                                                     Medications:  MEDICATIONS  (STANDING):  atorvastatin 80 milliGRAM(s) Oral at bedtime  chlorhexidine 2% Cloths 1 Application(s) Topical daily  clopidogrel Tablet 75 milliGRAM(s) Oral daily  dextrose 5%. 1000 milliLiter(s) (50 mL/Hr) IV Continuous <Continuous>  dextrose 5%. 1000 milliLiter(s) (100 mL/Hr) IV Continuous <Continuous>  dextrose 50% Injectable 25 Gram(s) IV Push once  dextrose 50% Injectable 25 Gram(s) IV Push once  dextrose 50% Injectable 12.5 Gram(s) IV Push once  glucagon  Injectable 1 milliGRAM(s) IntraMuscular once  influenza  Vaccine (HIGH DOSE) 0.7 milliLiter(s) IntraMuscular once  insulin glargine Injectable (LANTUS) 20 Unit(s) SubCutaneous at bedtime  insulin lispro (ADMELOG) corrective regimen sliding scale   SubCutaneous three times a day before meals  insulin lispro (ADMELOG) corrective regimen sliding scale   SubCutaneous at bedtime  metoprolol tartrate 50 milliGRAM(s) Oral two times a day    MEDICATIONS  (PRN):  acetaminophen     Tablet .. 650 milliGRAM(s) Oral every 6 hours PRN Temp greater or equal to 38C (100.4F), Moderate Pain (4 - 6)  dextrose Oral Gel 15 Gram(s) Oral once PRN Blood Glucose LESS THAN 70 milliGRAM(s)/deciliter  nitroglycerin     SubLingual 0.4 milliGRAM(s) SubLingual every 5 minutes PRN Chest Pain       Allergies    No Known Allergies    Intolerances      Vital Signs Last 24 Hrs  T(C): 36.6 (06 Jan 2024 11:47), Max: 36.6 (06 Jan 2024 11:47)  T(F): 97.8 (06 Jan 2024 11:47), Max: 97.8 (06 Jan 2024 11:47)  HR: 85 (06 Jan 2024 20:35) (55 - 94)  BP: 151/78 (06 Jan 2024 20:35) (113/56 - 151/78)  BP(mean): --  RR: 18 (06 Jan 2024 20:35) (18 - 18)  SpO2: 95% (06 Jan 2024 20:35) (95% - 97%)    Parameters below as of 06 Jan 2024 20:35  Patient On (Oxygen Delivery Method): room air      CAPILLARY BLOOD GLUCOSE      POCT Blood Glucose.: 203 mg/dL (06 Jan 2024 21:03)  POCT Blood Glucose.: 128 mg/dL (06 Jan 2024 17:04)  POCT Blood Glucose.: 133 mg/dL (06 Jan 2024 11:30)  POCT Blood Glucose.: 71 mg/dL (06 Jan 2024 07:32)  POCT Blood Glucose.: 169 mg/dL (06 Jan 2024 02:17)  POCT Blood Glucose.: 104 mg/dL (05 Jan 2024 23:37)  POCT Blood Glucose.: 57 mg/dL (05 Jan 2024 22:17)  POCT Blood Glucose.: 50 mg/dL (05 Jan 2024 22:16)      01-06 @ 07:01  -  01-06 @ 21:46  --------------------------------------------------------  IN: 480 mL / OUT: 550 mL / NET: -70 mL      Physical Exam:    Daily     Daily   General:  NAD   HEENT:  Nonicteric, PERRLA  CV:  RRR, S1S2   Lungs:  CTA B/L, no wheezes, rales, rhonchi  Abdomen:  Soft, non-tender, no distended, positive BS  Extremities:no edema                                                                                                                                                                                                                                                                                LABS:                               16.6   8.53  )-----------( 188      ( 05 Jan 2024 16:58 )             48.5                      01-06    143  |  105  |  38<H>  ----------------------------<  85  3.8   |  24  |  1.98<H>    Ca    9.5      06 Jan 2024 06:58  Phos  3.5     01-06  Mg     2.4     01-06    TPro  6.0  /  Alb  3.6  /  TBili  1.8<H>  /  DBili  0.4<H>  /  AST  24  /  ALT  17  /  AlkPhos  93  01-06                       RADIOLOGY & ADDITIONAL TESTS         I personally reviewed: [  ]EKG   [  ]CXR    [  ] CT      A/P:         Discussed with :     Derek consultants' Notes   Time spent :

## 2024-01-07 LAB
ALBUMIN SERPL ELPH-MCNC: 4.1 G/DL — SIGNIFICANT CHANGE UP (ref 3.3–5)
ALBUMIN SERPL ELPH-MCNC: 4.1 G/DL — SIGNIFICANT CHANGE UP (ref 3.3–5)
ALP SERPL-CCNC: 101 U/L — SIGNIFICANT CHANGE UP (ref 40–120)
ALP SERPL-CCNC: 101 U/L — SIGNIFICANT CHANGE UP (ref 40–120)
ALT FLD-CCNC: 18 U/L — SIGNIFICANT CHANGE UP (ref 10–45)
ALT FLD-CCNC: 18 U/L — SIGNIFICANT CHANGE UP (ref 10–45)
ANION GAP SERPL CALC-SCNC: 13 MMOL/L — SIGNIFICANT CHANGE UP (ref 5–17)
ANION GAP SERPL CALC-SCNC: 13 MMOL/L — SIGNIFICANT CHANGE UP (ref 5–17)
APPEARANCE UR: CLEAR — SIGNIFICANT CHANGE UP
APPEARANCE UR: CLEAR — SIGNIFICANT CHANGE UP
APTT BLD: 45.4 SEC — HIGH (ref 24.5–35.6)
APTT BLD: 45.4 SEC — HIGH (ref 24.5–35.6)
AST SERPL-CCNC: 26 U/L — SIGNIFICANT CHANGE UP (ref 10–40)
AST SERPL-CCNC: 26 U/L — SIGNIFICANT CHANGE UP (ref 10–40)
BASOPHILS # BLD AUTO: 0.02 K/UL — SIGNIFICANT CHANGE UP (ref 0–0.2)
BASOPHILS # BLD AUTO: 0.02 K/UL — SIGNIFICANT CHANGE UP (ref 0–0.2)
BASOPHILS NFR BLD AUTO: 0.2 % — SIGNIFICANT CHANGE UP (ref 0–2)
BASOPHILS NFR BLD AUTO: 0.2 % — SIGNIFICANT CHANGE UP (ref 0–2)
BILIRUB SERPL-MCNC: 1.6 MG/DL — HIGH (ref 0.2–1.2)
BILIRUB SERPL-MCNC: 1.6 MG/DL — HIGH (ref 0.2–1.2)
BILIRUB UR-MCNC: NEGATIVE — SIGNIFICANT CHANGE UP
BILIRUB UR-MCNC: NEGATIVE — SIGNIFICANT CHANGE UP
BUN SERPL-MCNC: 45 MG/DL — HIGH (ref 7–23)
BUN SERPL-MCNC: 45 MG/DL — HIGH (ref 7–23)
CALCIUM SERPL-MCNC: 9.4 MG/DL — SIGNIFICANT CHANGE UP (ref 8.4–10.5)
CALCIUM SERPL-MCNC: 9.4 MG/DL — SIGNIFICANT CHANGE UP (ref 8.4–10.5)
CHLORIDE SERPL-SCNC: 105 MMOL/L — SIGNIFICANT CHANGE UP (ref 96–108)
CHLORIDE SERPL-SCNC: 105 MMOL/L — SIGNIFICANT CHANGE UP (ref 96–108)
CO2 SERPL-SCNC: 21 MMOL/L — LOW (ref 22–31)
CO2 SERPL-SCNC: 21 MMOL/L — LOW (ref 22–31)
COLOR SPEC: YELLOW — SIGNIFICANT CHANGE UP
COLOR SPEC: YELLOW — SIGNIFICANT CHANGE UP
CREAT ?TM UR-MCNC: 68 MG/DL — SIGNIFICANT CHANGE UP
CREAT ?TM UR-MCNC: 68 MG/DL — SIGNIFICANT CHANGE UP
CREAT SERPL-MCNC: 2.05 MG/DL — HIGH (ref 0.5–1.3)
CREAT SERPL-MCNC: 2.05 MG/DL — HIGH (ref 0.5–1.3)
DIFF PNL FLD: NEGATIVE — SIGNIFICANT CHANGE UP
DIFF PNL FLD: NEGATIVE — SIGNIFICANT CHANGE UP
EGFR: 35 ML/MIN/1.73M2 — LOW
EGFR: 35 ML/MIN/1.73M2 — LOW
EOSINOPHIL # BLD AUTO: 0.17 K/UL — SIGNIFICANT CHANGE UP (ref 0–0.5)
EOSINOPHIL # BLD AUTO: 0.17 K/UL — SIGNIFICANT CHANGE UP (ref 0–0.5)
EOSINOPHIL NFR BLD AUTO: 2 % — SIGNIFICANT CHANGE UP (ref 0–6)
EOSINOPHIL NFR BLD AUTO: 2 % — SIGNIFICANT CHANGE UP (ref 0–6)
GLUCOSE BLDC GLUCOMTR-MCNC: 122 MG/DL — HIGH (ref 70–99)
GLUCOSE BLDC GLUCOMTR-MCNC: 122 MG/DL — HIGH (ref 70–99)
GLUCOSE BLDC GLUCOMTR-MCNC: 134 MG/DL — HIGH (ref 70–99)
GLUCOSE BLDC GLUCOMTR-MCNC: 134 MG/DL — HIGH (ref 70–99)
GLUCOSE BLDC GLUCOMTR-MCNC: 171 MG/DL — HIGH (ref 70–99)
GLUCOSE BLDC GLUCOMTR-MCNC: 171 MG/DL — HIGH (ref 70–99)
GLUCOSE BLDC GLUCOMTR-MCNC: 259 MG/DL — HIGH (ref 70–99)
GLUCOSE BLDC GLUCOMTR-MCNC: 259 MG/DL — HIGH (ref 70–99)
GLUCOSE BLDC GLUCOMTR-MCNC: 52 MG/DL — CRITICAL LOW (ref 70–99)
GLUCOSE BLDC GLUCOMTR-MCNC: 52 MG/DL — CRITICAL LOW (ref 70–99)
GLUCOSE BLDC GLUCOMTR-MCNC: 58 MG/DL — LOW (ref 70–99)
GLUCOSE BLDC GLUCOMTR-MCNC: 58 MG/DL — LOW (ref 70–99)
GLUCOSE BLDC GLUCOMTR-MCNC: 62 MG/DL — LOW (ref 70–99)
GLUCOSE BLDC GLUCOMTR-MCNC: 62 MG/DL — LOW (ref 70–99)
GLUCOSE BLDC GLUCOMTR-MCNC: 69 MG/DL — LOW (ref 70–99)
GLUCOSE BLDC GLUCOMTR-MCNC: 69 MG/DL — LOW (ref 70–99)
GLUCOSE BLDC GLUCOMTR-MCNC: 87 MG/DL — SIGNIFICANT CHANGE UP (ref 70–99)
GLUCOSE BLDC GLUCOMTR-MCNC: 87 MG/DL — SIGNIFICANT CHANGE UP (ref 70–99)
GLUCOSE SERPL-MCNC: 61 MG/DL — LOW (ref 70–99)
GLUCOSE SERPL-MCNC: 61 MG/DL — LOW (ref 70–99)
GLUCOSE UR QL: >=1000 MG/DL
GLUCOSE UR QL: >=1000 MG/DL
HCT VFR BLD CALC: 43.8 % — SIGNIFICANT CHANGE UP (ref 39–50)
HCT VFR BLD CALC: 43.8 % — SIGNIFICANT CHANGE UP (ref 39–50)
HGB BLD-MCNC: 14.9 G/DL — SIGNIFICANT CHANGE UP (ref 13–17)
HGB BLD-MCNC: 14.9 G/DL — SIGNIFICANT CHANGE UP (ref 13–17)
IMM GRANULOCYTES NFR BLD AUTO: 0.5 % — SIGNIFICANT CHANGE UP (ref 0–0.9)
IMM GRANULOCYTES NFR BLD AUTO: 0.5 % — SIGNIFICANT CHANGE UP (ref 0–0.9)
INR BLD: 2.83 RATIO — HIGH (ref 0.85–1.18)
INR BLD: 2.83 RATIO — HIGH (ref 0.85–1.18)
KETONES UR-MCNC: ABNORMAL MG/DL
KETONES UR-MCNC: ABNORMAL MG/DL
LEUKOCYTE ESTERASE UR-ACNC: NEGATIVE — SIGNIFICANT CHANGE UP
LEUKOCYTE ESTERASE UR-ACNC: NEGATIVE — SIGNIFICANT CHANGE UP
LYMPHOCYTES # BLD AUTO: 1.63 K/UL — SIGNIFICANT CHANGE UP (ref 1–3.3)
LYMPHOCYTES # BLD AUTO: 1.63 K/UL — SIGNIFICANT CHANGE UP (ref 1–3.3)
LYMPHOCYTES # BLD AUTO: 18.8 % — SIGNIFICANT CHANGE UP (ref 13–44)
LYMPHOCYTES # BLD AUTO: 18.8 % — SIGNIFICANT CHANGE UP (ref 13–44)
MCHC RBC-ENTMCNC: 31.5 PG — SIGNIFICANT CHANGE UP (ref 27–34)
MCHC RBC-ENTMCNC: 31.5 PG — SIGNIFICANT CHANGE UP (ref 27–34)
MCHC RBC-ENTMCNC: 34 GM/DL — SIGNIFICANT CHANGE UP (ref 32–36)
MCHC RBC-ENTMCNC: 34 GM/DL — SIGNIFICANT CHANGE UP (ref 32–36)
MCV RBC AUTO: 92.6 FL — SIGNIFICANT CHANGE UP (ref 80–100)
MCV RBC AUTO: 92.6 FL — SIGNIFICANT CHANGE UP (ref 80–100)
MONOCYTES # BLD AUTO: 0.84 K/UL — SIGNIFICANT CHANGE UP (ref 0–0.9)
MONOCYTES # BLD AUTO: 0.84 K/UL — SIGNIFICANT CHANGE UP (ref 0–0.9)
MONOCYTES NFR BLD AUTO: 9.7 % — SIGNIFICANT CHANGE UP (ref 2–14)
MONOCYTES NFR BLD AUTO: 9.7 % — SIGNIFICANT CHANGE UP (ref 2–14)
NEUTROPHILS # BLD AUTO: 5.99 K/UL — SIGNIFICANT CHANGE UP (ref 1.8–7.4)
NEUTROPHILS # BLD AUTO: 5.99 K/UL — SIGNIFICANT CHANGE UP (ref 1.8–7.4)
NEUTROPHILS NFR BLD AUTO: 68.8 % — SIGNIFICANT CHANGE UP (ref 43–77)
NEUTROPHILS NFR BLD AUTO: 68.8 % — SIGNIFICANT CHANGE UP (ref 43–77)
NITRITE UR-MCNC: NEGATIVE — SIGNIFICANT CHANGE UP
NITRITE UR-MCNC: NEGATIVE — SIGNIFICANT CHANGE UP
NRBC # BLD: 0 /100 WBCS — SIGNIFICANT CHANGE UP (ref 0–0)
NRBC # BLD: 0 /100 WBCS — SIGNIFICANT CHANGE UP (ref 0–0)
PH UR: 6.5 — SIGNIFICANT CHANGE UP (ref 5–8)
PH UR: 6.5 — SIGNIFICANT CHANGE UP (ref 5–8)
PLATELET # BLD AUTO: 183 K/UL — SIGNIFICANT CHANGE UP (ref 150–400)
PLATELET # BLD AUTO: 183 K/UL — SIGNIFICANT CHANGE UP (ref 150–400)
POTASSIUM SERPL-MCNC: 3.8 MMOL/L — SIGNIFICANT CHANGE UP (ref 3.5–5.3)
POTASSIUM SERPL-MCNC: 3.8 MMOL/L — SIGNIFICANT CHANGE UP (ref 3.5–5.3)
POTASSIUM SERPL-SCNC: 3.8 MMOL/L — SIGNIFICANT CHANGE UP (ref 3.5–5.3)
POTASSIUM SERPL-SCNC: 3.8 MMOL/L — SIGNIFICANT CHANGE UP (ref 3.5–5.3)
PROT ?TM UR-MCNC: 10 MG/DL — SIGNIFICANT CHANGE UP (ref 0–12)
PROT ?TM UR-MCNC: 10 MG/DL — SIGNIFICANT CHANGE UP (ref 0–12)
PROT SERPL-MCNC: 6.1 G/DL — SIGNIFICANT CHANGE UP (ref 6–8.3)
PROT SERPL-MCNC: 6.1 G/DL — SIGNIFICANT CHANGE UP (ref 6–8.3)
PROT UR-MCNC: NEGATIVE MG/DL — SIGNIFICANT CHANGE UP
PROT UR-MCNC: NEGATIVE MG/DL — SIGNIFICANT CHANGE UP
PROT/CREAT UR-RTO: 0.1 RATIO — SIGNIFICANT CHANGE UP (ref 0–0.2)
PROT/CREAT UR-RTO: 0.1 RATIO — SIGNIFICANT CHANGE UP (ref 0–0.2)
PROTHROM AB SERPL-ACNC: 30.2 SEC — HIGH (ref 9.5–13)
PROTHROM AB SERPL-ACNC: 30.2 SEC — HIGH (ref 9.5–13)
RBC # BLD: 4.73 M/UL — SIGNIFICANT CHANGE UP (ref 4.2–5.8)
RBC # BLD: 4.73 M/UL — SIGNIFICANT CHANGE UP (ref 4.2–5.8)
RBC # FLD: 13.2 % — SIGNIFICANT CHANGE UP (ref 10.3–14.5)
RBC # FLD: 13.2 % — SIGNIFICANT CHANGE UP (ref 10.3–14.5)
SODIUM SERPL-SCNC: 139 MMOL/L — SIGNIFICANT CHANGE UP (ref 135–145)
SODIUM SERPL-SCNC: 139 MMOL/L — SIGNIFICANT CHANGE UP (ref 135–145)
SODIUM UR-SCNC: 42 MMOL/L — SIGNIFICANT CHANGE UP
SODIUM UR-SCNC: 42 MMOL/L — SIGNIFICANT CHANGE UP
SP GR SPEC: 1.02 — SIGNIFICANT CHANGE UP (ref 1–1.03)
SP GR SPEC: 1.02 — SIGNIFICANT CHANGE UP (ref 1–1.03)
TROPONIN T, HIGH SENSITIVITY RESULT: 130 NG/L — HIGH (ref 0–51)
TROPONIN T, HIGH SENSITIVITY RESULT: 130 NG/L — HIGH (ref 0–51)
UROBILINOGEN FLD QL: 1 MG/DL — SIGNIFICANT CHANGE UP (ref 0.2–1)
UROBILINOGEN FLD QL: 1 MG/DL — SIGNIFICANT CHANGE UP (ref 0.2–1)
WBC # BLD: 8.69 K/UL — SIGNIFICANT CHANGE UP (ref 3.8–10.5)
WBC # BLD: 8.69 K/UL — SIGNIFICANT CHANGE UP (ref 3.8–10.5)
WBC # FLD AUTO: 8.69 K/UL — SIGNIFICANT CHANGE UP (ref 3.8–10.5)
WBC # FLD AUTO: 8.69 K/UL — SIGNIFICANT CHANGE UP (ref 3.8–10.5)

## 2024-01-07 RX ORDER — SODIUM BICARBONATE 1 MEQ/ML
0.06 SYRINGE (ML) INTRAVENOUS
Qty: 75 | Refills: 0 | Status: DISCONTINUED | OUTPATIENT
Start: 2024-01-07 | End: 2024-01-08

## 2024-01-07 RX ADMIN — CHLORHEXIDINE GLUCONATE 1 APPLICATION(S): 213 SOLUTION TOPICAL at 18:01

## 2024-01-07 RX ADMIN — Medication 50 MEQ/KG/HR: at 18:01

## 2024-01-07 RX ADMIN — Medication 50 MILLIGRAM(S): at 05:19

## 2024-01-07 RX ADMIN — INSULIN GLARGINE 20 UNIT(S): 100 INJECTION, SOLUTION SUBCUTANEOUS at 21:44

## 2024-01-07 RX ADMIN — Medication 1: at 21:44

## 2024-01-07 RX ADMIN — Medication 50 MILLIGRAM(S): at 18:01

## 2024-01-07 RX ADMIN — ATORVASTATIN CALCIUM 80 MILLIGRAM(S): 80 TABLET, FILM COATED ORAL at 21:44

## 2024-01-07 RX ADMIN — CLOPIDOGREL BISULFATE 75 MILLIGRAM(S): 75 TABLET, FILM COATED ORAL at 18:01

## 2024-01-07 NOTE — PROGRESS NOTE ADULT - SUBJECTIVE AND OBJECTIVE BOX
SUBJECTIVE: The patient denies chest pain, shortness of breath, arm pain or jaw pain, dizziness or palpitations.    	  MEDICATIONS:  metoprolol tartrate 50 milliGRAM(s) Oral two times a day  nitroglycerin     SubLingual 0.4 milliGRAM(s) SubLingual every 5 minutes PRN        acetaminophen     Tablet .. 650 milliGRAM(s) Oral every 6 hours PRN      atorvastatin 80 milliGRAM(s) Oral at bedtime  dextrose 50% Injectable 25 Gram(s) IV Push once  dextrose 50% Injectable 25 Gram(s) IV Push once  dextrose 50% Injectable 12.5 Gram(s) IV Push once  dextrose Oral Gel 15 Gram(s) Oral once PRN  glucagon  Injectable 1 milliGRAM(s) IntraMuscular once  insulin glargine Injectable (LANTUS) 20 Unit(s) SubCutaneous at bedtime  insulin lispro (ADMELOG) corrective regimen sliding scale   SubCutaneous at bedtime  insulin lispro (ADMELOG) corrective regimen sliding scale   SubCutaneous three times a day before meals    chlorhexidine 2% Cloths 1 Application(s) Topical daily  clopidogrel Tablet 75 milliGRAM(s) Oral daily  dextrose 5%. 1000 milliLiter(s) IV Continuous <Continuous>  dextrose 5%. 1000 milliLiter(s) IV Continuous <Continuous>  influenza  Vaccine (HIGH DOSE) 0.7 milliLiter(s) IntraMuscular once      REVIEW OF SYSTEMS:    CONSTITUTIONAL: No fever, weight loss, or fatigue  EYES: No eye pain, visual disturbances, or discharge  NECK: No pain or stiffness  RESPIRATORY: No cough, wheezing, chills or hemoptysis; No Shortness of Breath  CARDIOVASCULAR: No chest pain, palpitations, dizziness, or leg swelling  GASTROINTESTINAL: No abdominal or epigastric pain. No nausea, vomiting, or hematemesis; No diarrhea or constipation. No melena or hematochezia.  GENITOURINARY: No dysuria, frequency, hematuria, or incontinence  NEUROLOGICAL: No headaches, memory loss, loss of strength, numbness, or tremors  SKIN: No itching, burning, rashes, or lesions   LYMPH Nodes: No enlarged glands  MUSCULOSKELETAL: No joint pain or swelling; No muscle, back, or extremity pain  All other review of systems are negative.  	  [ ] Unable to obtain    PHYSICAL EXAM:  T(C): 36.6 (01-07-24 @ 04:43), Max: 36.6 (01-06-24 @ 11:47)  HR: 75 (01-07-24 @ 04:43) (55 - 85)  BP: 111/69 (01-07-24 @ 04:43) (111/69 - 151/78)  RR: 18 (01-07-24 @ 04:43) (18 - 18)  SpO2: 99% (01-07-24 @ 04:43) (95% - 99%)  Wt(kg): --  I&O's Summary    06 Jan 2024 07:01  -  07 Jan 2024 07:00  --------------------------------------------------------  IN: 480 mL / OUT: 550 mL / NET: -70 mL          PHYSICAL EXAM    Appearance: Normal	  HEENT:   Normal oral mucosa, PERRL, EOMI	  NECK: Soft and supple, No LAD, No JVD  Cardiovascular: Regular Rate and Rhythm, Normal S1 S2, No murmurs, No clicks, gallops or rubs  Respiratory: Lungs clear to auscultation	  Gastrointestinal:  Soft, Non-tender, + BS	  Skin: No rashes, No ecchymoses, No cyanosis  Neurologic: Non-focal  Extremities: No clubbing, cyanosis or edema  Vascular: Peripheral pulses palpable 2+ bilaterally    TELEMETRY: AF with moderate ventricular response	      RADIOLOGY  DIAGNOSTIC TESTING:  [ ] Echocardiogram:  [X ] Catheterization: to be done tomorrow  [ ] Stress Test:    OTHER: 	    LABS:	 	    CARDIAC MARKERS:    Troponin T,  serum   115 (01-06 @ 20:59)  138 (01-06 @ 14:08)  180 (01-06 @ 06:58)  165 (01-06 @ 00:46)  209 (01-05 @ 20:59)  231 (01-05 @ 16:58)                                14.9   8.69  )-----------( 183      ( 07 Jan 2024 07:24 )             43.8     01-06    143  |  105  |  38<H>  ----------------------------<  85  3.8   |  24  |  1.98<H>    Ca    9.5      06 Jan 2024 06:58  Phos  3.5     01-06  Mg     2.4     01-06    TPro  6.0  /  Alb  3.6  /  TBili  1.8<H>  /  DBili  0.4<H>  /  AST  24  /  ALT  17  /  AlkPhos  93  01-06      PTT 45.4

## 2024-01-07 NOTE — PROGRESS NOTE ADULT - SUBJECTIVE AND OBJECTIVE BOX
Date of service: 01-07-24 @ 08:27      Patient is a 68y old  Male who presents with a chief complaint of Chest pain (07 Jan 2024 07:59)                                                               INTERVAL HPI/OVERNIGHT EVENTS:    REVIEW OF SYSTEMS:     CONSTITUTIONAL: No weakness, fevers or chills  EYES/ENT: No visual changes , no ear ache   NECK: No pain or stiffness  RESPIRATORY: No cough, wheezing,  No shortness of breath  CARDIOVASCULAR: No chest pain or palpitations  GASTROINTESTINAL: No abdominal pain  . No nausea, vomiting, or hematemesis; No diarrhea or constipation. No melena or hematochezia.  GENITOURINARY: No dysuria, frequency or hematuria  NEUROLOGICAL: No numbness or weakness  SKIN: No itching, burning, rashes, or lesions                                                                                                                                                                                                                                                                                 Medications:  MEDICATIONS  (STANDING):  atorvastatin 80 milliGRAM(s) Oral at bedtime  chlorhexidine 2% Cloths 1 Application(s) Topical daily  clopidogrel Tablet 75 milliGRAM(s) Oral daily  dextrose 5%. 1000 milliLiter(s) (50 mL/Hr) IV Continuous <Continuous>  dextrose 5%. 1000 milliLiter(s) (100 mL/Hr) IV Continuous <Continuous>  dextrose 50% Injectable 25 Gram(s) IV Push once  dextrose 50% Injectable 25 Gram(s) IV Push once  dextrose 50% Injectable 12.5 Gram(s) IV Push once  glucagon  Injectable 1 milliGRAM(s) IntraMuscular once  influenza  Vaccine (HIGH DOSE) 0.7 milliLiter(s) IntraMuscular once  insulin glargine Injectable (LANTUS) 20 Unit(s) SubCutaneous at bedtime  insulin lispro (ADMELOG) corrective regimen sliding scale   SubCutaneous three times a day before meals  insulin lispro (ADMELOG) corrective regimen sliding scale   SubCutaneous at bedtime  metoprolol tartrate 50 milliGRAM(s) Oral two times a day    MEDICATIONS  (PRN):  acetaminophen     Tablet .. 650 milliGRAM(s) Oral every 6 hours PRN Temp greater or equal to 38C (100.4F), Moderate Pain (4 - 6)  dextrose Oral Gel 15 Gram(s) Oral once PRN Blood Glucose LESS THAN 70 milliGRAM(s)/deciliter  nitroglycerin     SubLingual 0.4 milliGRAM(s) SubLingual every 5 minutes PRN Chest Pain       Allergies    No Known Allergies    Intolerances      Vital Signs Last 24 Hrs  T(C): 36.6 (07 Jan 2024 04:43), Max: 36.6 (06 Jan 2024 11:47)  T(F): 97.8 (07 Jan 2024 04:43), Max: 97.8 (06 Jan 2024 11:47)  HR: 75 (07 Jan 2024 04:43) (55 - 85)  BP: 111/69 (07 Jan 2024 04:43) (111/69 - 151/78)  BP(mean): --  RR: 18 (07 Jan 2024 04:43) (18 - 18)  SpO2: 99% (07 Jan 2024 04:43) (95% - 99%)    Parameters below as of 07 Jan 2024 04:43  Patient On (Oxygen Delivery Method): room air      CAPILLARY BLOOD GLUCOSE      POCT Blood Glucose.: 87 mg/dL (07 Jan 2024 08:17)  POCT Blood Glucose.: 58 mg/dL (07 Jan 2024 07:59)  POCT Blood Glucose.: 52 mg/dL (07 Jan 2024 07:19)  POCT Blood Glucose.: 62 mg/dL (07 Jan 2024 07:18)  POCT Blood Glucose.: 203 mg/dL (06 Jan 2024 21:03)  POCT Blood Glucose.: 128 mg/dL (06 Jan 2024 17:04)  POCT Blood Glucose.: 133 mg/dL (06 Jan 2024 11:30)      01-06 @ 07:01  -  01-07 @ 07:00  --------------------------------------------------------  IN: 480 mL / OUT: 550 mL / NET: -70 mL      Physical Exam:    Daily     Daily   General:  Well appearing, NAD, not cachetic  HEENT:  Nonicteric, PERRLA  CV:  RRR, S1S2   Lungs:  CTA B/L, no wheezes, rales, rhonchi  Abdomen:  Soft, non-tender, no distended, positive BS  Extremities:  2+ pulses, no c/c, no edema  Skin:  Warm and dry, no rashes  :  No moreno  Neuro:  AAOx3, non-focal, grossly intact                                                                                                                                                                                                                                                                                                LABS:                               14.9   8.69  )-----------( 183      ( 07 Jan 2024 07:24 )             43.8                      01-07    139  |  105  |  45<H>  ----------------------------<  61<L>  3.8   |  21<L>  |  2.05<H>    Ca    9.4      07 Jan 2024 07:23  Phos  3.5     01-06  Mg     2.4     01-06    TPro  6.1  /  Alb  4.1  /  TBili  1.6<H>  /  DBili  x   /  AST  26  /  ALT  18  /  AlkPhos  101  01-07                       RADIOLOGY & ADDITIONAL TESTS         I personally reviewed: [  ]EKG   [  ]CXR    [  ] CT      A/P:         Discussed with :     Derek consultants' Notes   Time spent :

## 2024-01-07 NOTE — PROVIDER CONTACT NOTE (HYPOGLYCEMIA EVENT) - NS PROVIDER CONTACT BACKGROUND-HYPO
Age: 68y    Gender: Male    POCT Blood Glucose:  122 mg/dL (01-07-24 @ 09:03)  69 mg/dL (01-07-24 @ 08:35)  87 mg/dL (01-07-24 @ 08:17)  58 mg/dL (01-07-24 @ 07:59)  52 mg/dL (01-07-24 @ 07:19)  62 mg/dL (01-07-24 @ 07:18)  203 mg/dL (01-06-24 @ 21:03)  128 mg/dL (01-06-24 @ 17:04)      eMAR:atorvastatin   80 milliGRAM(s) Oral (01-06-24 @ 21:09)    insulin glargine Injectable (LANTUS)   20 Unit(s) SubCutaneous (01-06-24 @ 21:08)

## 2024-01-07 NOTE — CONSULT NOTE ADULT - SUBJECTIVE AND OBJECTIVE BOX
Patient is a 68y old  Male who presents with a chief complaint of Chest pain (2024 08:26)      HPI:  68-year-old male PMH of CAD with 6 stents (most recent TITO to ostial LM on 23, HFrEF (EF~50%, 23), A-fib on Xarelto, HTN, HLD, T2DM, CKD3, former smoker, presents to ED complaining of progressively worsening exertional chest pain over the past 2 week. Nephrology consulted for elevated Cr. Patient is aware of his elevated Cr. He is familiar to our service from previous admissions.  No chronic NSAID use. No urinary complaints. NO pedal edema. Clinically euvolemic on exam.     PAST MEDICAL & SURGICAL HISTORY:  Former smoker      CAD in native artery      Type 2 diabetes mellitus      Hyperlipidemia, unspecified hyperlipidemia type      Essential hypertension, hypertension with unspecified goal      Afib      Hematoma of leg      Stented coronary artery      CHF (congestive heart failure)      s/p Carotid Endarterectomy  left          MEDICATIONS  (STANDING):  atorvastatin 80 milliGRAM(s) Oral at bedtime  chlorhexidine 2% Cloths 1 Application(s) Topical daily  clopidogrel Tablet 75 milliGRAM(s) Oral daily  dextrose 5%. 1000 milliLiter(s) (50 mL/Hr) IV Continuous <Continuous>  dextrose 5%. 1000 milliLiter(s) (100 mL/Hr) IV Continuous <Continuous>  dextrose 50% Injectable 12.5 Gram(s) IV Push once  dextrose 50% Injectable 25 Gram(s) IV Push once  dextrose 50% Injectable 25 Gram(s) IV Push once  glucagon  Injectable 1 milliGRAM(s) IntraMuscular once  influenza  Vaccine (HIGH DOSE) 0.7 milliLiter(s) IntraMuscular once  insulin glargine Injectable (LANTUS) 20 Unit(s) SubCutaneous at bedtime  insulin lispro (ADMELOG) corrective regimen sliding scale   SubCutaneous at bedtime  insulin lispro (ADMELOG) corrective regimen sliding scale   SubCutaneous three times a day before meals  metoprolol tartrate 50 milliGRAM(s) Oral two times a day  sodium bicarbonate  Infusion 0.061 mEq/kG/Hr (50 mL/Hr) IV Continuous <Continuous>      Allergies    No Known Allergies    Intolerances        SOCIAL HISTORY:  Denies ETOh,Smoking,     FAMILY HISTORY:  Family history of heart disease  father  age 71    FH: atrial fibrillation  sister        REVIEW OF SYSTEMS:  CONSTITUTIONAL: No weakness, fevers or chills  EYES/ENT: No visual changes;  No vertigo or throat pain   NECK: No pain or stiffness  RESPIRATORY: No cough, wheezing, hemoptysis; No shortness of breath  CARDIOVASCULAR: No chest pain or palpitations  GASTROINTESTINAL: No abdominal or epigastric pain. No nausea, vomiting, or hematemesis; No diarrhea or constipation. No melena or hematochezia.  GENITOURINARY: No dysuria, frequency or hematuria  NEUROLOGICAL: No numbness or weakness  SKIN: No itching, burning, rashes, or lesions   All other review of systems is negative unless indicated above.    VITAL:  T(C): , Max: 36.6 (24 @ 04:43)  T(F): , Max: 97.9 (24 @ 10:57)  HR: 73 (24 @ 10:57)  BP: 150/69 (24 @ 10:57)  BP(mean): --  RR: 18 (24 @ 10:57)  SpO2: 97% (24 @ 10:57)  Wt(kg): --    PHYSICAL EXAM:  Constitutional: NAD, Alert  HEENT: NCAT, MMM  Neck: Supple, No JVD  Respiratory: CTA-b/l  Cardiovascular: RRR s1s2, no m/r/g  Gastrointestinal: BS+, soft, NT/ND  Extremities: No peripheral edema b/l  Neurological: no focal deficits; strength grossly intact  Back: no CVAT b/l  Skin: No rashes, no nevi    LABS:                        14.9   8.69  )-----------( 183      ( 2024 07:24 )             43.8     Na(139)/K(3.8)/Cl(105)/HCO3(21)/BUN(45)/Cr(2.05)Glu(61)/Ca(9.4)/Mg(--)/PO4(--)     @ 07:23  Na(143)/K(3.8)/Cl(105)/HCO3(24)/BUN(38)/Cr(1.98)Glu(85)/Ca(9.5)/Mg(2.4)/PO4(3.5)     @ 06:58  Na(143)/K(5.0)/Cl(103)/HCO3(27)/BUN(45)/Cr(2.19)Glu(98)/Ca(10.7)/Mg(--)/PO4(--)     @ 16:58    Urinalysis Basic - ( 2024 07:23 )    Color: x / Appearance: x / SG: x / pH: x  Gluc: 61 mg/dL / Ketone: x  / Bili: x / Urobili: x   Blood: x / Protein: x / Nitrite: x   Leuk Esterase: x / RBC: x / WBC x   Sq Epi: x / Non Sq Epi: x / Bacteria: x            IMAGIN yo M with PMH of afib, CAD multiple PCIs w/ recent TITO 3/7/23 to LCx, CKD3 IDDM2 p/w chest pain    - CKD III due to DM/HTN baseline Cr mid 1 range   - BARRY DD pre-renal azotemia  vs obstructive  - Hypertension controlled  - Volume status acceptable  - High anion  gap, metabolic acidosis       PLAN:   - Give 0.45% saline + 75 meq NaHCo3 at 50 cc/hr x 20 hours   - Urinalysis, Urine lytes, Urine Protein, Urine Creatinine  - Post void bladder scan   - NO Diuretics  - No ACE/ARB/SGLT2   - Strict I/O  - Daily weights  - Renal dosing of meds to creatinine clearance of 40-45 ml/min  - Avoid nephrotoxins as able  - Coronary angiogram when renal function is back to baseline        Thank you for involving [facility] in this patient's care.    With warm regards,    Katherine Bustos MD   [facility]  Office: [phone]           Patient is a 68y old  Male who presents with a chief complaint of Chest pain (2024 08:26)      HPI:  68-year-old male PMH of CAD with 6 stents (most recent TITO to ostial LM on 23, HFrEF (EF~50%, 23), A-fib on Xarelto, HTN, HLD, T2DM, CKD3, former smoker, presents to ED complaining of progressively worsening exertional chest pain over the past 2 week. Nephrology consulted for elevated Cr. Patient is aware of his elevated Cr. He is familiar to our service from previous admissions.  No chronic NSAID use. No urinary complaints. NO pedal edema. Clinically euvolemic on exam.     PAST MEDICAL & SURGICAL HISTORY:  Former smoker      CAD in native artery      Type 2 diabetes mellitus      Hyperlipidemia, unspecified hyperlipidemia type      Essential hypertension, hypertension with unspecified goal      Afib      Hematoma of leg      Stented coronary artery      CHF (congestive heart failure)      s/p Carotid Endarterectomy  left          MEDICATIONS  (STANDING):  atorvastatin 80 milliGRAM(s) Oral at bedtime  chlorhexidine 2% Cloths 1 Application(s) Topical daily  clopidogrel Tablet 75 milliGRAM(s) Oral daily  dextrose 5%. 1000 milliLiter(s) (50 mL/Hr) IV Continuous <Continuous>  dextrose 5%. 1000 milliLiter(s) (100 mL/Hr) IV Continuous <Continuous>  dextrose 50% Injectable 12.5 Gram(s) IV Push once  dextrose 50% Injectable 25 Gram(s) IV Push once  dextrose 50% Injectable 25 Gram(s) IV Push once  glucagon  Injectable 1 milliGRAM(s) IntraMuscular once  influenza  Vaccine (HIGH DOSE) 0.7 milliLiter(s) IntraMuscular once  insulin glargine Injectable (LANTUS) 20 Unit(s) SubCutaneous at bedtime  insulin lispro (ADMELOG) corrective regimen sliding scale   SubCutaneous at bedtime  insulin lispro (ADMELOG) corrective regimen sliding scale   SubCutaneous three times a day before meals  metoprolol tartrate 50 milliGRAM(s) Oral two times a day  sodium bicarbonate  Infusion 0.061 mEq/kG/Hr (50 mL/Hr) IV Continuous <Continuous>      Allergies    No Known Allergies    Intolerances        SOCIAL HISTORY:  Denies ETOh,Smoking,     FAMILY HISTORY:  Family history of heart disease  father  age 71    FH: atrial fibrillation  sister        REVIEW OF SYSTEMS:  CONSTITUTIONAL: No weakness, fevers or chills  EYES/ENT: No visual changes;  No vertigo or throat pain   NECK: No pain or stiffness  RESPIRATORY: No cough, wheezing, hemoptysis; No shortness of breath  CARDIOVASCULAR: No chest pain or palpitations  GASTROINTESTINAL: No abdominal or epigastric pain. No nausea, vomiting, or hematemesis; No diarrhea or constipation. No melena or hematochezia.  GENITOURINARY: No dysuria, frequency or hematuria  NEUROLOGICAL: No numbness or weakness  SKIN: No itching, burning, rashes, or lesions   All other review of systems is negative unless indicated above.    VITAL:  T(C): , Max: 36.6 (24 @ 04:43)  T(F): , Max: 97.9 (24 @ 10:57)  HR: 73 (24 @ 10:57)  BP: 150/69 (24 @ 10:57)  BP(mean): --  RR: 18 (24 @ 10:57)  SpO2: 97% (24 @ 10:57)  Wt(kg): --    PHYSICAL EXAM:  Constitutional: NAD, Alert  HEENT: NCAT, MMM  Neck: Supple, No JVD  Respiratory: CTA-b/l  Cardiovascular: RRR s1s2, no m/r/g  Gastrointestinal: BS+, soft, NT/ND  Extremities: No peripheral edema b/l  Neurological: no focal deficits; strength grossly intact  Back: no CVAT b/l  Skin: No rashes, no nevi    LABS:                        14.9   8.69  )-----------( 183      ( 2024 07:24 )             43.8     Na(139)/K(3.8)/Cl(105)/HCO3(21)/BUN(45)/Cr(2.05)Glu(61)/Ca(9.4)/Mg(--)/PO4(--)     @ 07:23  Na(143)/K(3.8)/Cl(105)/HCO3(24)/BUN(38)/Cr(1.98)Glu(85)/Ca(9.5)/Mg(2.4)/PO4(3.5)     @ 06:58  Na(143)/K(5.0)/Cl(103)/HCO3(27)/BUN(45)/Cr(2.19)Glu(98)/Ca(10.7)/Mg(--)/PO4(--)     @ 16:58    Urinalysis Basic - ( 2024 07:23 )    Color: x / Appearance: x / SG: x / pH: x  Gluc: 61 mg/dL / Ketone: x  / Bili: x / Urobili: x   Blood: x / Protein: x / Nitrite: x   Leuk Esterase: x / RBC: x / WBC x   Sq Epi: x / Non Sq Epi: x / Bacteria: x            [age] yo M with PMH of afib, CAD multiple PCIs w/ recent TITO 3/7/23 to LCx, CKD3 IDDM2 p/w chest pain    - CKD III due to DM/HTN baseline Cr mid 1 range   - BARRY DD pre-renal azotemia  vs obstructive  - Hypertension controlled  - Volume status acceptable  - High anion  gap, metabolic acidosis       PLAN:   - Give 0.45% saline + 75 meq NaHCo3 at 50 cc/hr x 20 hours   - Urinalysis, Urine lytes, Urine Protein, Urine Creatinine  - Post void bladder scan   - NO Diuretics  - No ACE/ARB/SGLT2   - Strict I/O  - Daily weights  - Renal dosing of meds to creatinine clearance of 40-45 ml/min  - Avoid nephrotoxins as able  - Coronary angiogram when renal function is back to baseline        Thank you for involving [facility] in this patient's care.    With warm regards,    Katherine Bustos MD   [facility]  Office: [phone]

## 2024-01-08 LAB
ALBUMIN SERPL ELPH-MCNC: 3.4 G/DL — SIGNIFICANT CHANGE UP (ref 3.3–5)
ALBUMIN SERPL ELPH-MCNC: 3.4 G/DL — SIGNIFICANT CHANGE UP (ref 3.3–5)
ALP SERPL-CCNC: 108 U/L — SIGNIFICANT CHANGE UP (ref 40–120)
ALP SERPL-CCNC: 108 U/L — SIGNIFICANT CHANGE UP (ref 40–120)
ALT FLD-CCNC: 14 U/L — SIGNIFICANT CHANGE UP (ref 10–45)
ALT FLD-CCNC: 14 U/L — SIGNIFICANT CHANGE UP (ref 10–45)
ANION GAP SERPL CALC-SCNC: 15 MMOL/L — SIGNIFICANT CHANGE UP (ref 5–17)
ANION GAP SERPL CALC-SCNC: 15 MMOL/L — SIGNIFICANT CHANGE UP (ref 5–17)
APTT BLD: 41.7 SEC — HIGH (ref 24.5–35.6)
APTT BLD: 41.7 SEC — HIGH (ref 24.5–35.6)
AST SERPL-CCNC: 25 U/L — SIGNIFICANT CHANGE UP (ref 10–40)
AST SERPL-CCNC: 25 U/L — SIGNIFICANT CHANGE UP (ref 10–40)
BILIRUB SERPL-MCNC: 1.2 MG/DL — SIGNIFICANT CHANGE UP (ref 0.2–1.2)
BILIRUB SERPL-MCNC: 1.2 MG/DL — SIGNIFICANT CHANGE UP (ref 0.2–1.2)
BUN SERPL-MCNC: 46 MG/DL — HIGH (ref 7–23)
BUN SERPL-MCNC: 46 MG/DL — HIGH (ref 7–23)
CALCIUM SERPL-MCNC: 9.2 MG/DL — SIGNIFICANT CHANGE UP (ref 8.4–10.5)
CALCIUM SERPL-MCNC: 9.2 MG/DL — SIGNIFICANT CHANGE UP (ref 8.4–10.5)
CHLORIDE SERPL-SCNC: 107 MMOL/L — SIGNIFICANT CHANGE UP (ref 96–108)
CHLORIDE SERPL-SCNC: 107 MMOL/L — SIGNIFICANT CHANGE UP (ref 96–108)
CO2 SERPL-SCNC: 20 MMOL/L — LOW (ref 22–31)
CO2 SERPL-SCNC: 20 MMOL/L — LOW (ref 22–31)
CREAT SERPL-MCNC: 1.96 MG/DL — HIGH (ref 0.5–1.3)
CREAT SERPL-MCNC: 1.96 MG/DL — HIGH (ref 0.5–1.3)
EGFR: 37 ML/MIN/1.73M2 — LOW
EGFR: 37 ML/MIN/1.73M2 — LOW
GLUCOSE BLDC GLUCOMTR-MCNC: 135 MG/DL — HIGH (ref 70–99)
GLUCOSE BLDC GLUCOMTR-MCNC: 135 MG/DL — HIGH (ref 70–99)
GLUCOSE BLDC GLUCOMTR-MCNC: 142 MG/DL — HIGH (ref 70–99)
GLUCOSE BLDC GLUCOMTR-MCNC: 142 MG/DL — HIGH (ref 70–99)
GLUCOSE BLDC GLUCOMTR-MCNC: 155 MG/DL — HIGH (ref 70–99)
GLUCOSE BLDC GLUCOMTR-MCNC: 155 MG/DL — HIGH (ref 70–99)
GLUCOSE BLDC GLUCOMTR-MCNC: 79 MG/DL — SIGNIFICANT CHANGE UP (ref 70–99)
GLUCOSE BLDC GLUCOMTR-MCNC: 79 MG/DL — SIGNIFICANT CHANGE UP (ref 70–99)
GLUCOSE SERPL-MCNC: 71 MG/DL — SIGNIFICANT CHANGE UP (ref 70–99)
GLUCOSE SERPL-MCNC: 71 MG/DL — SIGNIFICANT CHANGE UP (ref 70–99)
INR BLD: 1.72 RATIO — HIGH (ref 0.85–1.18)
INR BLD: 1.72 RATIO — HIGH (ref 0.85–1.18)
POTASSIUM SERPL-MCNC: 3.8 MMOL/L — SIGNIFICANT CHANGE UP (ref 3.5–5.3)
POTASSIUM SERPL-MCNC: 3.8 MMOL/L — SIGNIFICANT CHANGE UP (ref 3.5–5.3)
POTASSIUM SERPL-SCNC: 3.8 MMOL/L — SIGNIFICANT CHANGE UP (ref 3.5–5.3)
POTASSIUM SERPL-SCNC: 3.8 MMOL/L — SIGNIFICANT CHANGE UP (ref 3.5–5.3)
PROT SERPL-MCNC: 5.6 G/DL — LOW (ref 6–8.3)
PROT SERPL-MCNC: 5.6 G/DL — LOW (ref 6–8.3)
PROTHROM AB SERPL-ACNC: 17.8 SEC — HIGH (ref 9.5–13)
PROTHROM AB SERPL-ACNC: 17.8 SEC — HIGH (ref 9.5–13)
SODIUM SERPL-SCNC: 142 MMOL/L — SIGNIFICANT CHANGE UP (ref 135–145)
SODIUM SERPL-SCNC: 142 MMOL/L — SIGNIFICANT CHANGE UP (ref 135–145)

## 2024-01-08 PROCEDURE — 99152 MOD SED SAME PHYS/QHP 5/>YRS: CPT

## 2024-01-08 PROCEDURE — 92928 PRQ TCAT PLMT NTRAC ST 1 LES: CPT | Mod: LM

## 2024-01-08 PROCEDURE — 93458 L HRT ARTERY/VENTRICLE ANGIO: CPT | Mod: 26,59

## 2024-01-08 PROCEDURE — 93010 ELECTROCARDIOGRAM REPORT: CPT

## 2024-01-08 RX ORDER — HYDRALAZINE HCL 50 MG
25 TABLET ORAL ONCE
Refills: 0 | Status: COMPLETED | OUTPATIENT
Start: 2024-01-08 | End: 2024-01-08

## 2024-01-08 RX ORDER — ASPIRIN/CALCIUM CARB/MAGNESIUM 324 MG
81 TABLET ORAL DAILY
Refills: 0 | Status: DISCONTINUED | OUTPATIENT
Start: 2024-01-09 | End: 2024-01-09

## 2024-01-08 RX ORDER — ASPIRIN/CALCIUM CARB/MAGNESIUM 324 MG
81 TABLET ORAL ONCE
Refills: 0 | Status: COMPLETED | OUTPATIENT
Start: 2024-01-08 | End: 2024-01-08

## 2024-01-08 RX ORDER — INSULIN GLARGINE 100 [IU]/ML
10 INJECTION, SOLUTION SUBCUTANEOUS ONCE
Refills: 0 | Status: COMPLETED | OUTPATIENT
Start: 2024-01-08 | End: 2024-01-08

## 2024-01-08 RX ORDER — HYDRALAZINE HCL 50 MG
25 TABLET ORAL EVERY 8 HOURS
Refills: 0 | Status: DISCONTINUED | OUTPATIENT
Start: 2024-01-09 | End: 2024-01-09

## 2024-01-08 RX ORDER — SODIUM CHLORIDE 9 MG/ML
1000 INJECTION INTRAMUSCULAR; INTRAVENOUS; SUBCUTANEOUS
Refills: 0 | Status: DISCONTINUED | OUTPATIENT
Start: 2024-01-08 | End: 2024-01-09

## 2024-01-08 RX ORDER — LABETALOL HCL 100 MG
10 TABLET ORAL ONCE
Refills: 0 | Status: COMPLETED | OUTPATIENT
Start: 2024-01-08 | End: 2024-01-08

## 2024-01-08 RX ORDER — HYDRALAZINE HCL 50 MG
10 TABLET ORAL ONCE
Refills: 0 | Status: COMPLETED | OUTPATIENT
Start: 2024-01-08 | End: 2024-01-08

## 2024-01-08 RX ADMIN — Medication 50 MILLIGRAM(S): at 18:20

## 2024-01-08 RX ADMIN — CLOPIDOGREL BISULFATE 75 MILLIGRAM(S): 75 TABLET, FILM COATED ORAL at 12:29

## 2024-01-08 RX ADMIN — INSULIN GLARGINE 10 UNIT(S): 100 INJECTION, SOLUTION SUBCUTANEOUS at 22:18

## 2024-01-08 RX ADMIN — Medication 50 MILLIGRAM(S): at 05:21

## 2024-01-08 RX ADMIN — Medication 0.4 MILLIGRAM(S): at 18:07

## 2024-01-08 RX ADMIN — CHLORHEXIDINE GLUCONATE 1 APPLICATION(S): 213 SOLUTION TOPICAL at 12:30

## 2024-01-08 RX ADMIN — Medication 0.4 MILLIGRAM(S): at 20:35

## 2024-01-08 RX ADMIN — ATORVASTATIN CALCIUM 80 MILLIGRAM(S): 80 TABLET, FILM COATED ORAL at 22:18

## 2024-01-08 RX ADMIN — Medication 10 MILLIGRAM(S): at 17:40

## 2024-01-08 RX ADMIN — Medication 10 MILLIGRAM(S): at 20:27

## 2024-01-08 RX ADMIN — Medication 25 MILLIGRAM(S): at 19:31

## 2024-01-08 RX ADMIN — SODIUM CHLORIDE 100 MILLILITER(S): 9 INJECTION INTRAMUSCULAR; INTRAVENOUS; SUBCUTANEOUS at 16:12

## 2024-01-08 RX ADMIN — Medication 81 MILLIGRAM(S): at 22:18

## 2024-01-08 NOTE — CHART NOTE - NSCHARTNOTEFT_GEN_A_CORE
Removal of Femoral Sheath    Pulses in the right lower extremity are dopplerable. The patient was placed in the supine position. The insertion site was identified and the sutures were removed per protocol.  The 6 [place] femoral sheath was then removed. Direct pressure was applied for  25 minutes.     Monitoring of the right groin and both lower extremities including neuro-vascular checks and vital signs every 15 minutes x 4, then every 30 minutes x 2, then every 1 hour was ordered.    Complications: None/Other    Comments:  Patient tolerated procedure well. Good hemostasis achieved post sheath pull. No acute distress noted. Importance of medication adherence with DAPT stressed to patient, as well as groin care. Patient verbalized understanding. Will continue to monitor closely. Removal of Femoral Sheath    Pulses in the right lower extremity are dopplerable. The patient was placed in the supine position. The insertion site was identified and the sutures were removed per protocol.  The 6 [place] femoral sheath was then removed. Direct pressure was applied for  25 minutes.     Monitoring of the right groin and both lower extremities including neuro-vascular checks and vital signs every 15 minutes x 4, then every 30 minutes x 2, then every 1 hour was ordered.    Complications: None/Other    Comments:  Patient tolerated procedure well. Good hemostasis achieved post sheath pull. No acute distress noted. Importance of medication adherence with DAPT stressed to patient, as well as groin care. Patient verbalized understanding. Will continue to monitor closely.

## 2024-01-08 NOTE — CHART NOTE - NSCHARTNOTEFT_GEN_A_CORE
S/p PCI - TITO to pLAD x1, mLAD x1, pLM x1  Pt with c/o 5/10 CP that started post PCI at 1800  Post EKG repeated with 1mm ST depressions V3-V6   /100 HR 95 RR 16 O2 sat 96%RA  Labetalol 10mg IVPx2  NTG 0.4mg SL x1 given with relief  Dr Dominguez at bedside to evaluate pt and reviewed EKG's - no further intervention at this time    *On re-evaluation pt is CP free and BP improved to baseline 130s/60s    Plan:  -continue tele monitoring  -PO metoprolol now  -Repeat EKG if pain reoccurs  -D/C sheath at 2000  -ASA/Plavix/Xarelto for 1 week then drop ASA and continue Xarelto and Plavix  -Cath service to follow  -All other management per primary team    Geetha Au, NP-C  [id number] S/p PCI - TITO to pLAD x1, mLAD x1, pLM x1  Pt with c/o 5/10 CP that started post PCI at 1800  Post EKG repeated with 1mm ST depressions V3-V6   /100 HR 95 RR 16 O2 sat 96%RA  Labetalol 10mg IVPx2  NTG 0.4mg SL x1 given with relief  Dr Dominguez at bedside to evaluate pt and reviewed EKG's - no further intervention at this time    *On re-evaluation pt is CP free and BP improved to baseline 130s/60s    Plan:  -continue tele monitoring  -PO metoprolol now  -Repeat EKG if pain reoccurs  -D/C sheath at 2000  -ASA/Plavix/Xarelto for 1 week then drop ASA and continue Xarelto and Plavix  -Cath service to follow  -All other management per primary team    Geetha Au, NP-C  [id number]

## 2024-01-08 NOTE — PROGRESS NOTE ADULT - SUBJECTIVE AND OBJECTIVE BOX
SUBJECTIVE: The patient denies chest pain, shortness of breath, arm pain or jaw pain, dizziness or palpitations.    MEDICATIONS  (STANDING):  atorvastatin 80 milliGRAM(s) Oral at bedtime  chlorhexidine 2% Cloths 1 Application(s) Topical daily  clopidogrel Tablet 75 milliGRAM(s) Oral daily  dextrose 5%. 1000 milliLiter(s) (50 mL/Hr) IV Continuous <Continuous>  dextrose 5%. 1000 milliLiter(s) (100 mL/Hr) IV Continuous <Continuous>  dextrose 50% Injectable 12.5 Gram(s) IV Push once  dextrose 50% Injectable 25 Gram(s) IV Push once  dextrose 50% Injectable 25 Gram(s) IV Push once  glucagon  Injectable 1 milliGRAM(s) IntraMuscular once  influenza  Vaccine (HIGH DOSE) 0.7 milliLiter(s) IntraMuscular once  insulin glargine Injectable (LANTUS) 20 Unit(s) SubCutaneous at bedtime  insulin lispro (ADMELOG) corrective regimen sliding scale   SubCutaneous at bedtime  insulin lispro (ADMELOG) corrective regimen sliding scale   SubCutaneous three times a day before meals  metoprolol tartrate 50 milliGRAM(s) Oral two times a day  sodium bicarbonate  Infusion 0.061 mEq/kG/Hr (50 mL/Hr) IV Continuous <Continuous>    MEDICATIONS  (PRN):  acetaminophen     Tablet .. 650 milliGRAM(s) Oral every 6 hours PRN Temp greater or equal to 38C (100.4F), Moderate Pain (4 - 6)  dextrose Oral Gel 15 Gram(s) Oral once PRN Blood Glucose LESS THAN 70 milliGRAM(s)/deciliter  nitroglycerin     SubLingual 0.4 milliGRAM(s) SubLingual every 5 minutes PRN Chest Pain        REVIEW OF SYSTEMS:    CONSTITUTIONAL: No fever, weight loss, or fatigue  EYES: No eye pain, visual disturbances, or discharge  NECK: No pain or stiffness  RESPIRATORY: No cough, wheezing, chills or hemoptysis; No Shortness of Breath  CARDIOVASCULAR: No chest pain, palpitations, dizziness, or leg swelling  GASTROINTESTINAL: No abdominal or epigastric pain. No nausea, vomiting, or hematemesis; No diarrhea or constipation. No melena or hematochezia.  GENITOURINARY: No dysuria, frequency, hematuria, or incontinence  NEUROLOGICAL: No headaches, memory loss, loss of strength, numbness, or tremors  SKIN: No itching, burning, rashes, or lesions   LYMPH Nodes: No enlarged glands  MUSCULOSKELETAL: No joint pain or swelling; No muscle, back, or extremity pain  All other review of systems are negative.  	  [ ] Unable to obtain    PHYSICAL EXAM:  T(F): 98.1 (01-08-24 @ 04:48), Max: 98.1 (01-08-24 @ 04:48)  HR: 65 (01-08-24 @ 04:48) (65 - 80)  BP: 128/62 (01-08-24 @ 04:48) (127/68 - 149/75)  RR: 18 (01-08-24 @ 04:48) (18 - 18)  SpO2: 96% (01-08-24 @ 04:48) (96% - 99%)  Wt(kg): --  ,   I&O's Summary    07 Jan 2024 07:01  -  08 Jan 2024 07:00  --------------------------------------------------------  IN: 600 mL / OUT: 750 mL / NET: -150 mL            PHYSICAL EXAM    Appearance: Normal	  HEENT:   Normal oral mucosa, PERRL, EOMI	  NECK: Soft and supple, No LAD, No JVD  Cardiovascular: Regular Rate and Rhythm, Normal S1 S2, No murmurs, No clicks, gallops or rubs  Respiratory: Lungs clear to auscultation	  Gastrointestinal:  Soft, Non-tender, + BS	  Skin: No rashes, No ecchymoses, No cyanosis  Neurologic: Non-focal  Extremities: No clubbing, cyanosis or edema  Vascular: Peripheral pulses palpable 2+ bilaterally    TELEMETRY: AF with moderate ventricular response	      RADIOLOGY  DIAGNOSTIC TESTING:  [ ] Echocardiogram:  [X ] Catheterization: pending  [ ] Stress Test:    OTHER: 	    LABS:	 	    CARDIAC MARKERS:    Troponin T,  serum   115 (01-06 @ 20:59)  138 (01-06 @ 14:08)  180 (01-06 @ 06:58)  165 (01-06 @ 00:46)  209 (01-05 @ 20:59)  231 (01-05 @ 16:58)                                14.9   8.69  )-----------( 183      ( 07 Jan 2024 07:24 )             43.8     01-06    143  |  105  |  38<H>  ----------------------------<  85  3.8   |  24  |  1.98<H>    Ca    9.5      06 Jan 2024 06:58  Phos  3.5     01-06  Mg     2.4     01-06    TPro  6.0  /  Alb  3.6  /  TBili  1.8<H>  /  DBili  0.4<H>  /  AST  24  /  ALT  17  /  AlkPhos  93  01-06      PTT 45.4  -----------------------------------------------------------------------------------------------------------------------      142  |  107  |  46<H>  ----------------------------<  71  3.8   |  20<L>  |  1.96<H>    Ca    9.2      08 Jan 2024 07:24    TPro  5.6<L>  /  Alb  3.4  /  TBili  1.2  /  DBili  x   /  AST  25  /  ALT  14  /  AlkPhos  108  01-08    PT/INR - ( 07 Jan 2024 07:25 )   PT: 30.2 sec;   INR: 2.83 ratio         PTT - ( 07 Jan 2024 07:25 )  PTT:45.4 sec                   Urinalysis Basic - ( 08 Jan 2024 07:24 )    Color: x / Appearance: x / SG: x / pH: x  Gluc: 71 mg/dL / Ketone: x  / Bili: x / Urobili: x   Blood: x / Protein: x / Nitrite: x   Leuk Esterase: x / RBC: x / WBC x   Sq Epi: x / Non Sq Epi: x / Bacteria: x

## 2024-01-08 NOTE — PROGRESS NOTE ADULT - SUBJECTIVE AND OBJECTIVE BOX
Overnight events noted      VITAL:  T(C): , Max: 36.7 (01-08-24 @ 04:48)  T(F): , Max: 98.1 (01-08-24 @ 04:48)  HR: 65 (01-08-24 @ 04:48)  BP: 128/62 (01-08-24 @ 04:48)  BP(mean): --  RR: 18 (01-08-24 @ 04:48)  SpO2: 96% (01-08-24 @ 04:48)  Wt(kg): --      PHYSICAL EXAM:  Constitutional: NAD, Alert  HEENT: NCAT, MMM  Neck: Supple, No JVD  Respiratory: CTA-b/l  Cardiovascular: RRR s1s2, no m/r/g  Gastrointestinal: BS+, soft, NT/ND  Extremities: No peripheral edema b/l  Neurological: no focal deficits; strength grossly intact  Back: no CVAT b/l  Skin: No rashes, no nevi    LABS:                        14.9   8.69  )-----------( 183      ( 07 Jan 2024 07:24 )             43.8     Na(142)/K(3.8)/Cl(107)/HCO3(20)/BUN(46)/Cr(1.96)Glu(71)/Ca(9.2)/Mg(--)/PO4(--)    01-08 @ 07:24  Na(139)/K(3.8)/Cl(105)/HCO3(21)/BUN(45)/Cr(2.05)Glu(61)/Ca(9.4)/Mg(--)/PO4(--)    01-07 @ 07:23  Na(143)/K(3.8)/Cl(105)/HCO3(24)/BUN(38)/Cr(1.98)Glu(85)/Ca(9.5)/Mg(2.4)/PO4(3.5)    01-06 @ 06:58  Na(143)/K(5.0)/Cl(103)/HCO3(27)/BUN(45)/Cr(2.19)Glu(98)/Ca(10.7)/Mg(--)/PO4(--)    01-05 @ 16:58      Sodium, Random Urine: 42 mmol/L (01-07 @ 21:45)  Creatinine, Random Urine: 68 mg/dL (01-07 @ 21:45)  Protein/Creatinine Ratio Calculation: 0.1 Ratio (01-07 @ 21:45)      IMPRESSION: 68M HTN, DM2, CKD, AFib, CAD, and HFpEF, 1/5/24 p/w chest pain/troponinemia  (1)CKD - nonproteinuric CKD3b - due to hypertension/atherosclerotic disease - near baseline  (2)Lytes - acceptable  (3)Cardiac - NSTEMI - indicated for cath    RECOMMEND:  (1)No renal objection to cardiac cath; would treat periprocedurally with NS 100cc/h x 5h as tolerated, to minimize DENNIS risk  (2)BMP daily    Markell Walton MD  [facility]  Office/on call physician: [phone]  Cell (7a-7p): [phone]       Overnight events noted      VITAL:  T(C): , Max: 36.7 (01-08-24 @ 04:48)  T(F): , Max: 98.1 (01-08-24 @ 04:48)  HR: 65 (01-08-24 @ 04:48)  BP: 128/62 (01-08-24 @ 04:48)  BP(mean): --  RR: 18 (01-08-24 @ 04:48)  SpO2: 96% (01-08-24 @ 04:48)  Wt(kg): --      PHYSICAL EXAM:  Constitutional: NAD, Alert  HEENT: NCAT, MMM  Neck: Supple, No JVD  Respiratory: CTA-b/l  Cardiovascular: RRR s1s2, no m/r/g  Gastrointestinal: BS+, soft, NT/ND  Extremities: No peripheral edema b/l  Neurological: no focal deficits; strength grossly intact  Back: no CVAT b/l  Skin: No rashes, no nevi    LABS:                        14.9   8.69  )-----------( 183      ( 07 Jan 2024 07:24 )             43.8     Na(142)/K(3.8)/Cl(107)/HCO3(20)/BUN(46)/Cr(1.96)Glu(71)/Ca(9.2)/Mg(--)/PO4(--)    01-08 @ 07:24  Na(139)/K(3.8)/Cl(105)/HCO3(21)/BUN(45)/Cr(2.05)Glu(61)/Ca(9.4)/Mg(--)/PO4(--)    01-07 @ 07:23  Na(143)/K(3.8)/Cl(105)/HCO3(24)/BUN(38)/Cr(1.98)Glu(85)/Ca(9.5)/Mg(2.4)/PO4(3.5)    01-06 @ 06:58  Na(143)/K(5.0)/Cl(103)/HCO3(27)/BUN(45)/Cr(2.19)Glu(98)/Ca(10.7)/Mg(--)/PO4(--)    01-05 @ 16:58      Sodium, Random Urine: 42 mmol/L (01-07 @ 21:45)  Creatinine, Random Urine: 68 mg/dL (01-07 @ 21:45)  Protein/Creatinine Ratio Calculation: 0.1 Ratio (01-07 @ 21:45)      IMPRESSION: 68M HTN, DM2, CKD, AFib, CAD, and HFpEF, 1/5/24 p/w chest pain/troponinemia  (1)CKD - nonproteinuric CKD3b - due to hypertension/atherosclerotic disease - near baseline  (2)Lytes - acceptable  (3)Cardiac - NSTEMI - indicated for cath    RECOMMEND:  (1)No renal objection to cardiac cath; would treat periprocedurally with NS 100cc/h x 5h as tolerated, to minimize DENNIS risk  (2)BMP daily    Markell Walton MD  [facility]  Office/on call physician: [phone]  Cell (7a-7p): [phone]       No pain, no sob    VITAL:  T(C): , Max: 36.7 (01-08-24 @ 04:48)  T(F): , Max: 98.1 (01-08-24 @ 04:48)  HR: 65 (01-08-24 @ 04:48)  BP: 128/62 (01-08-24 @ 04:48)  BP(mean): --  RR: 18 (01-08-24 @ 04:48)  SpO2: 96% (01-08-24 @ 04:48)  Wt(kg): --      PHYSICAL EXAM:  Constitutional: NAD, Alert  HEENT: NCAT, MMM  Neck: Supple, No JVD  Respiratory: CTA-b/l  Cardiovascular: RRR s1s2, no m/r/g  Gastrointestinal: BS+, soft, NT/ND  Extremities: No peripheral edema b/l  Neurological: no focal deficits; strength grossly intact  Back: no CVAT b/l  Skin: No rashes, no nevi    LABS:                        14.9   8.69  )-----------( 183      ( 07 Jan 2024 07:24 )             43.8     Na(142)/K(3.8)/Cl(107)/HCO3(20)/BUN(46)/Cr(1.96)Glu(71)/Ca(9.2)/Mg(--)/PO4(--)    01-08 @ 07:24  Na(139)/K(3.8)/Cl(105)/HCO3(21)/BUN(45)/Cr(2.05)Glu(61)/Ca(9.4)/Mg(--)/PO4(--)    01-07 @ 07:23  Na(143)/K(3.8)/Cl(105)/HCO3(24)/BUN(38)/Cr(1.98)Glu(85)/Ca(9.5)/Mg(2.4)/PO4(3.5)    01-06 @ 06:58  Na(143)/K(5.0)/Cl(103)/HCO3(27)/BUN(45)/Cr(2.19)Glu(98)/Ca(10.7)/Mg(--)/PO4(--)    01-05 @ 16:58      Sodium, Random Urine: 42 mmol/L (01-07 @ 21:45)  Creatinine, Random Urine: 68 mg/dL (01-07 @ 21:45)  Protein/Creatinine Ratio Calculation: 0.1 Ratio (01-07 @ 21:45)      IMPRESSION: 68M HTN, DM2, CKD, AFib, CAD, and HFpEF, 1/5/24 p/w chest pain/troponinemia  (1)CKD - nonproteinuric CKD3b - due to hypertension/atherosclerotic disease - near baseline  (2)Lytes - acceptable  (3)Cardiac - NSTEMI - indicated for cath    RECOMMEND:  (1)No renal objection to cardiac cath; would treat periprocedurally with NS 100cc/h x 5h as tolerated, to minimize DENNIS risk  (2)BMP daily    Markell Walton MD  [facility]  Office/on call physician: [phone]  Cell (7a-7p): [phone]       No pain, no sob    VITAL:  T(C): , Max: 36.7 (01-08-24 @ 04:48)  T(F): , Max: 98.1 (01-08-24 @ 04:48)  HR: 65 (01-08-24 @ 04:48)  BP: 128/62 (01-08-24 @ 04:48)  BP(mean): --  RR: 18 (01-08-24 @ 04:48)  SpO2: 96% (01-08-24 @ 04:48)  Wt(kg): --      PHYSICAL EXAM:  Constitutional: NAD, Alert  HEENT: NCAT, MMM  Neck: Supple, No JVD  Respiratory: CTA-b/l  Cardiovascular: RRR s1s2, no m/r/g  Gastrointestinal: BS+, soft, NT/ND  Extremities: No peripheral edema b/l  Neurological: no focal deficits; strength grossly intact  Back: no CVAT b/l  Skin: No rashes, no nevi    LABS:                        14.9   8.69  )-----------( 183      ( 07 Jan 2024 07:24 )             43.8     Na(142)/K(3.8)/Cl(107)/HCO3(20)/BUN(46)/Cr(1.96)Glu(71)/Ca(9.2)/Mg(--)/PO4(--)    01-08 @ 07:24  Na(139)/K(3.8)/Cl(105)/HCO3(21)/BUN(45)/Cr(2.05)Glu(61)/Ca(9.4)/Mg(--)/PO4(--)    01-07 @ 07:23  Na(143)/K(3.8)/Cl(105)/HCO3(24)/BUN(38)/Cr(1.98)Glu(85)/Ca(9.5)/Mg(2.4)/PO4(3.5)    01-06 @ 06:58  Na(143)/K(5.0)/Cl(103)/HCO3(27)/BUN(45)/Cr(2.19)Glu(98)/Ca(10.7)/Mg(--)/PO4(--)    01-05 @ 16:58      Sodium, Random Urine: 42 mmol/L (01-07 @ 21:45)  Creatinine, Random Urine: 68 mg/dL (01-07 @ 21:45)  Protein/Creatinine Ratio Calculation: 0.1 Ratio (01-07 @ 21:45)      IMPRESSION: 68M HTN, DM2, CKD, AFib, CAD, and HFpEF, 1/5/24 p/w chest pain/troponinemia  (1)CKD - nonproteinuric CKD3b - due to hypertension/atherosclerotic disease - near baseline  (2)Lytes - acceptable  (3)Cardiac - NSTEMI - indicated for cath    RECOMMEND:  (1)No renal objection to cardiac cath; would treat periprocedurally with NS 100cc/h x 5h as tolerated, to minimize DENNIS risk  (2)BMP daily    Markell Walton MD  [facility]  Office/on call physician: [phone]  Cell (7a-7p): [phone]

## 2024-01-08 NOTE — PROGRESS NOTE ADULT - SUBJECTIVE AND OBJECTIVE BOX
Date of service: 24 @ 19:03      Patient is a 68y old  Male who presents with a chief complaint of Chest pain (2024 11:16)                                                               INTERVAL HPI/OVERNIGHT EVENTS:    REVIEW OF SYSTEMS:     CONSTITUTIONAL: No weakness, fevers or chills  EYES/ENT: No visual changes , no ear ache   NECK: No pain or stiffness  RESPIRATORY: No cough, wheezing,  No shortness of breath  CARDIOVASCULAR: No chest pain or palpitations  GASTROINTESTINAL: No abdominal pain  . No nausea, vomiting, or hematemesis; No diarrhea or constipation. No melena or hematochezia.  GENITOURINARY: No dysuria, frequency or hematuria  NEUROLOGICAL: No numbness or weakness  SKIN: No itching, burning, rashes, or lesions                                                                                                                                                                                                                                                                                 Medications:  MEDICATIONS  (STANDING):  atorvastatin 80 milliGRAM(s) Oral at bedtime  chlorhexidine 2% Cloths 1 Application(s) Topical daily  clopidogrel Tablet 75 milliGRAM(s) Oral daily  dextrose 5%. 1000 milliLiter(s) (50 mL/Hr) IV Continuous <Continuous>  dextrose 5%. 1000 milliLiter(s) (100 mL/Hr) IV Continuous <Continuous>  dextrose 50% Injectable 12.5 Gram(s) IV Push once  dextrose 50% Injectable 25 Gram(s) IV Push once  dextrose 50% Injectable 25 Gram(s) IV Push once  glucagon  Injectable 1 milliGRAM(s) IntraMuscular once  influenza  Vaccine (HIGH DOSE) 0.7 milliLiter(s) IntraMuscular once  insulin glargine Injectable (LANTUS) 20 Unit(s) SubCutaneous at bedtime  insulin lispro (ADMELOG) corrective regimen sliding scale   SubCutaneous at bedtime  insulin lispro (ADMELOG) corrective regimen sliding scale   SubCutaneous three times a day before meals  metoprolol tartrate 50 milliGRAM(s) Oral two times a day  sodium chloride 0.9%. 1000 milliLiter(s) (100 mL/Hr) IV Continuous <Continuous>    MEDICATIONS  (PRN):  acetaminophen     Tablet .. 650 milliGRAM(s) Oral every 6 hours PRN Temp greater or equal to 38C (100.4F), Moderate Pain (4 - 6)  dextrose Oral Gel 15 Gram(s) Oral once PRN Blood Glucose LESS THAN 70 milliGRAM(s)/deciliter  nitroglycerin     SubLingual 0.4 milliGRAM(s) SubLingual every 5 minutes PRN Chest Pain       Allergies    No Known Allergies    Intolerances      Vital Signs Last 24 Hrs  T(C): 36.7 (2024 16:13), Max: 37.3 (2024 10:59)  T(F): 98 (2024 16:13), Max: 99.2 (2024 10:59)  HR: 82 (2024 19:00) (65 - 94)  BP: 164/82 (2024 19:00) (128/62 - 209/100)  BP(mean): --  RR: 16 (2024 19:00) (16 - 18)  SpO2: 93% (2024 19:00) (93% - 99%)    Parameters below as of 2024 18:17  Patient On (Oxygen Delivery Method): room air      CAPILLARY BLOOD GLUCOSE      POCT Blood Glucose.: 155 mg/dL (2024 18:19)  POCT Blood Glucose.: 142 mg/dL (2024 11:41)  POCT Blood Glucose.: 79 mg/dL (2024 07:33)  POCT Blood Glucose.: 259 mg/dL (2024 21:23)       @ 07:  -  -08 @ 07:00  --------------------------------------------------------  IN: 600 mL / OUT: 750 mL / NET: -150 mL     @ 07:  -   @ 19:03  --------------------------------------------------------  IN: 440 mL / OUT: 0 mL / NET: 440 mL      Physical Exam:    Daily     Daily Weight in k (2024 08:17)  General:  Well appearing, NAD, not cachetic  HEENT:  Nonicteric, PERRLA  CV:  RRR, S1S2   Lungs:  CTA B/L, no wheezes, rales, rhonchi  Abdomen:  Soft, non-tender, no distended, positive BS  Extremities:  2+ pulses, no c/c, no edema  Skin:  Warm and dry, no rashes  :  No moreno  Neuro:  AAOx3, non-focal, grossly intact                                                                                                                                                                                                                                                                                                LABS:                               14.9   8.69  )-----------( 183      ( 2024 07:24 )             43.8                          142  |  107  |  46<H>  ----------------------------<  71  3.8   |  20<L>  |  1.96<H>    Ca    9.2      2024 07:24    TPro  5.6<L>  /  Alb  3.4  /  TBili  1.2  /  DBili  x   /  AST  25  /  ALT  14  /  AlkPhos  108                         RADIOLOGY & ADDITIONAL TESTS         I personally reviewed: [  ]EKG   [  ]CXR    [  ] CT      A/P:         Discussed with :     Derek consultants' Notes   Time spent :

## 2024-01-09 ENCOUNTER — TRANSCRIPTION ENCOUNTER (OUTPATIENT)
Age: 69
End: 2024-01-09

## 2024-01-09 VITALS
OXYGEN SATURATION: 98 % | DIASTOLIC BLOOD PRESSURE: 55 MMHG | SYSTOLIC BLOOD PRESSURE: 113 MMHG | RESPIRATION RATE: 18 BRPM | HEART RATE: 91 BPM | TEMPERATURE: 98 F

## 2024-01-09 LAB
ANION GAP SERPL CALC-SCNC: 13 MMOL/L — SIGNIFICANT CHANGE UP (ref 5–17)
ANION GAP SERPL CALC-SCNC: 13 MMOL/L — SIGNIFICANT CHANGE UP (ref 5–17)
APTT BLD: 36.7 SEC — HIGH (ref 24.5–35.6)
APTT BLD: 36.7 SEC — HIGH (ref 24.5–35.6)
BUN SERPL-MCNC: 35 MG/DL — HIGH (ref 7–23)
BUN SERPL-MCNC: 35 MG/DL — HIGH (ref 7–23)
CALCIUM SERPL-MCNC: 9.3 MG/DL — SIGNIFICANT CHANGE UP (ref 8.4–10.5)
CALCIUM SERPL-MCNC: 9.3 MG/DL — SIGNIFICANT CHANGE UP (ref 8.4–10.5)
CHLORIDE SERPL-SCNC: 106 MMOL/L — SIGNIFICANT CHANGE UP (ref 96–108)
CHLORIDE SERPL-SCNC: 106 MMOL/L — SIGNIFICANT CHANGE UP (ref 96–108)
CO2 SERPL-SCNC: 20 MMOL/L — LOW (ref 22–31)
CO2 SERPL-SCNC: 20 MMOL/L — LOW (ref 22–31)
CREAT SERPL-MCNC: 1.62 MG/DL — HIGH (ref 0.5–1.3)
CREAT SERPL-MCNC: 1.62 MG/DL — HIGH (ref 0.5–1.3)
EGFR: 47 ML/MIN/1.73M2 — LOW
EGFR: 47 ML/MIN/1.73M2 — LOW
GLUCOSE BLDC GLUCOMTR-MCNC: 170 MG/DL — HIGH (ref 70–99)
GLUCOSE BLDC GLUCOMTR-MCNC: 170 MG/DL — HIGH (ref 70–99)
GLUCOSE BLDC GLUCOMTR-MCNC: 85 MG/DL — SIGNIFICANT CHANGE UP (ref 70–99)
GLUCOSE BLDC GLUCOMTR-MCNC: 85 MG/DL — SIGNIFICANT CHANGE UP (ref 70–99)
GLUCOSE SERPL-MCNC: 112 MG/DL — HIGH (ref 70–99)
GLUCOSE SERPL-MCNC: 112 MG/DL — HIGH (ref 70–99)
HCT VFR BLD CALC: 45 % — SIGNIFICANT CHANGE UP (ref 39–50)
HCT VFR BLD CALC: 45 % — SIGNIFICANT CHANGE UP (ref 39–50)
HGB BLD-MCNC: 15.3 G/DL — SIGNIFICANT CHANGE UP (ref 13–17)
HGB BLD-MCNC: 15.3 G/DL — SIGNIFICANT CHANGE UP (ref 13–17)
INR BLD: 1.68 RATIO — HIGH (ref 0.85–1.18)
INR BLD: 1.68 RATIO — HIGH (ref 0.85–1.18)
MCHC RBC-ENTMCNC: 31.4 PG — SIGNIFICANT CHANGE UP (ref 27–34)
MCHC RBC-ENTMCNC: 31.4 PG — SIGNIFICANT CHANGE UP (ref 27–34)
MCHC RBC-ENTMCNC: 34 GM/DL — SIGNIFICANT CHANGE UP (ref 32–36)
MCHC RBC-ENTMCNC: 34 GM/DL — SIGNIFICANT CHANGE UP (ref 32–36)
MCV RBC AUTO: 92.4 FL — SIGNIFICANT CHANGE UP (ref 80–100)
MCV RBC AUTO: 92.4 FL — SIGNIFICANT CHANGE UP (ref 80–100)
NRBC # BLD: 0 /100 WBCS — SIGNIFICANT CHANGE UP (ref 0–0)
NRBC # BLD: 0 /100 WBCS — SIGNIFICANT CHANGE UP (ref 0–0)
PLATELET # BLD AUTO: 177 K/UL — SIGNIFICANT CHANGE UP (ref 150–400)
PLATELET # BLD AUTO: 177 K/UL — SIGNIFICANT CHANGE UP (ref 150–400)
POTASSIUM SERPL-MCNC: 4.3 MMOL/L — SIGNIFICANT CHANGE UP (ref 3.5–5.3)
POTASSIUM SERPL-MCNC: 4.3 MMOL/L — SIGNIFICANT CHANGE UP (ref 3.5–5.3)
POTASSIUM SERPL-SCNC: 4.3 MMOL/L — SIGNIFICANT CHANGE UP (ref 3.5–5.3)
POTASSIUM SERPL-SCNC: 4.3 MMOL/L — SIGNIFICANT CHANGE UP (ref 3.5–5.3)
PROTHROM AB SERPL-ACNC: 17.4 SEC — HIGH (ref 9.5–13)
PROTHROM AB SERPL-ACNC: 17.4 SEC — HIGH (ref 9.5–13)
RBC # BLD: 4.87 M/UL — SIGNIFICANT CHANGE UP (ref 4.2–5.8)
RBC # BLD: 4.87 M/UL — SIGNIFICANT CHANGE UP (ref 4.2–5.8)
RBC # FLD: 13.4 % — SIGNIFICANT CHANGE UP (ref 10.3–14.5)
RBC # FLD: 13.4 % — SIGNIFICANT CHANGE UP (ref 10.3–14.5)
SODIUM SERPL-SCNC: 139 MMOL/L — SIGNIFICANT CHANGE UP (ref 135–145)
SODIUM SERPL-SCNC: 139 MMOL/L — SIGNIFICANT CHANGE UP (ref 135–145)
WBC # BLD: 12.22 K/UL — HIGH (ref 3.8–10.5)
WBC # BLD: 12.22 K/UL — HIGH (ref 3.8–10.5)
WBC # FLD AUTO: 12.22 K/UL — HIGH (ref 3.8–10.5)
WBC # FLD AUTO: 12.22 K/UL — HIGH (ref 3.8–10.5)

## 2024-01-09 PROCEDURE — C1769: CPT

## 2024-01-09 PROCEDURE — 83970 ASSAY OF PARATHORMONE: CPT

## 2024-01-09 PROCEDURE — 36415 COLL VENOUS BLD VENIPUNCTURE: CPT

## 2024-01-09 PROCEDURE — 82248 BILIRUBIN DIRECT: CPT

## 2024-01-09 PROCEDURE — 80048 BASIC METABOLIC PNL TOTAL CA: CPT

## 2024-01-09 PROCEDURE — 84300 ASSAY OF URINE SODIUM: CPT

## 2024-01-09 PROCEDURE — 82570 ASSAY OF URINE CREATININE: CPT

## 2024-01-09 PROCEDURE — 84443 ASSAY THYROID STIM HORMONE: CPT

## 2024-01-09 PROCEDURE — 80053 COMPREHEN METABOLIC PANEL: CPT

## 2024-01-09 PROCEDURE — 82550 ASSAY OF CK (CPK): CPT

## 2024-01-09 PROCEDURE — 93458 L HRT ARTERY/VENTRICLE ANGIO: CPT | Mod: 59

## 2024-01-09 PROCEDURE — 83880 ASSAY OF NATRIURETIC PEPTIDE: CPT

## 2024-01-09 PROCEDURE — C9600: CPT | Mod: LM

## 2024-01-09 PROCEDURE — 82962 GLUCOSE BLOOD TEST: CPT

## 2024-01-09 PROCEDURE — C1725: CPT

## 2024-01-09 PROCEDURE — 93005 ELECTROCARDIOGRAM TRACING: CPT

## 2024-01-09 PROCEDURE — 93010 ELECTROCARDIOGRAM REPORT: CPT

## 2024-01-09 PROCEDURE — 99232 SBSQ HOSP IP/OBS MODERATE 35: CPT

## 2024-01-09 PROCEDURE — 82553 CREATINE MB FRACTION: CPT

## 2024-01-09 PROCEDURE — 71045 X-RAY EXAM CHEST 1 VIEW: CPT

## 2024-01-09 PROCEDURE — 84100 ASSAY OF PHOSPHORUS: CPT

## 2024-01-09 PROCEDURE — 84156 ASSAY OF PROTEIN URINE: CPT

## 2024-01-09 PROCEDURE — 83690 ASSAY OF LIPASE: CPT

## 2024-01-09 PROCEDURE — 87640 STAPH A DNA AMP PROBE: CPT

## 2024-01-09 PROCEDURE — 81001 URINALYSIS AUTO W/SCOPE: CPT

## 2024-01-09 PROCEDURE — 82310 ASSAY OF CALCIUM: CPT

## 2024-01-09 PROCEDURE — C1894: CPT

## 2024-01-09 PROCEDURE — 84484 ASSAY OF TROPONIN QUANT: CPT

## 2024-01-09 PROCEDURE — C1874: CPT

## 2024-01-09 PROCEDURE — 85027 COMPLETE CBC AUTOMATED: CPT

## 2024-01-09 PROCEDURE — 87641 MR-STAPH DNA AMP PROBE: CPT

## 2024-01-09 PROCEDURE — 99285 EMERGENCY DEPT VISIT HI MDM: CPT

## 2024-01-09 PROCEDURE — 85025 COMPLETE CBC W/AUTO DIFF WBC: CPT

## 2024-01-09 PROCEDURE — 85610 PROTHROMBIN TIME: CPT

## 2024-01-09 PROCEDURE — 81003 URINALYSIS AUTO W/O SCOPE: CPT

## 2024-01-09 PROCEDURE — 83735 ASSAY OF MAGNESIUM: CPT

## 2024-01-09 PROCEDURE — 85730 THROMBOPLASTIN TIME PARTIAL: CPT

## 2024-01-09 PROCEDURE — C1887: CPT

## 2024-01-09 RX ORDER — SACUBITRIL AND VALSARTAN 24; 26 MG/1; MG/1
1 TABLET, FILM COATED ORAL
Refills: 0 | Status: DISCONTINUED | OUTPATIENT
Start: 2024-01-09 | End: 2024-01-09

## 2024-01-09 RX ORDER — RIVAROXABAN 15 MG-20MG
15 KIT ORAL
Refills: 0 | Status: DISCONTINUED | OUTPATIENT
Start: 2024-01-09 | End: 2024-01-09

## 2024-01-09 RX ORDER — RIVAROXABAN 15 MG-20MG
1 KIT ORAL
Refills: 0 | DISCHARGE

## 2024-01-09 RX ORDER — ACETAMINOPHEN 500 MG
2 TABLET ORAL
Qty: 0 | Refills: 0 | DISCHARGE
Start: 2024-01-09

## 2024-01-09 RX ORDER — NITROGLYCERIN 6.5 MG
0.4 CAPSULE, EXTENDED RELEASE ORAL ONCE
Refills: 0 | Status: COMPLETED | OUTPATIENT
Start: 2024-01-09 | End: 2024-01-09

## 2024-01-09 RX ORDER — NITROGLYCERIN 6.5 MG
0.4 CAPSULE, EXTENDED RELEASE ORAL
Refills: 0 | Status: DISCONTINUED | OUTPATIENT
Start: 2024-01-09 | End: 2024-01-09

## 2024-01-09 RX ORDER — RIVAROXABAN 15 MG-20MG
1 KIT ORAL
Qty: 30 | Refills: 0
Start: 2024-01-09 | End: 2024-02-07

## 2024-01-09 RX ADMIN — CHLORHEXIDINE GLUCONATE 1 APPLICATION(S): 213 SOLUTION TOPICAL at 11:29

## 2024-01-09 RX ADMIN — Medication 81 MILLIGRAM(S): at 11:29

## 2024-01-09 RX ADMIN — Medication 50 MILLIGRAM(S): at 05:06

## 2024-01-09 RX ADMIN — Medication 25 MILLIGRAM(S): at 03:57

## 2024-01-09 RX ADMIN — Medication 1: at 12:03

## 2024-01-09 RX ADMIN — Medication 0.4 MILLIGRAM(S): at 00:37

## 2024-01-09 RX ADMIN — CLOPIDOGREL BISULFATE 75 MILLIGRAM(S): 75 TABLET, FILM COATED ORAL at 11:29

## 2024-01-09 NOTE — DISCHARGE NOTE PROVIDER - PROVIDER TOKENS
PROVIDER:[TOKEN:[[id number]],FOLLOWUP:[1-3 days],ESTABLISHEDPATIENT:[T]],PROVIDER:[TOKEN:[[id number]],FOLLOWUP:[1-3 days],ESTABLISHEDPATIENT:[T]],PROVIDER:[TOKEN:[[id number]],FOLLOWUP:[1 month]] PROVIDER:[TOKEN:[[id number]],FOLLOWUP:[1-3 days],ESTABLISHEDPATIENT:[T]],PROVIDER:[TOKEN:[[id number]],FOLLOWUP:[1-3 days],ESTABLISHEDPATIENT:[T]],PROVIDER:[TOKEN:[[id number]],FOLLOWUP:[1 month]] PROVIDER:[TOKEN:[[id number]],FOLLOWUP:[1-3 days],ESTABLISHEDPATIENT:[T]],PROVIDER:[TOKEN:[[id number]],FOLLOWUP:[1-3 days],ESTABLISHEDPATIENT:[T]],PROVIDER:[TOKEN:[[id number]],FOLLOWUP:[1 month]],PROVIDER:[TOKEN:[[id number]],FOLLOWUP:[1-3 days]] PROVIDER:[TOKEN:[[id number]],FOLLOWUP:[1-3 days],ESTABLISHEDPATIENT:[T]],PROVIDER:[TOKEN:[[id number]],FOLLOWUP:[1-3 days],ESTABLISHEDPATIENT:[T]],PROVIDER:[TOKEN:[[id number]],FOLLOWUP:[1 month]],PROVIDER:[TOKEN:[[id number]],FOLLOWUP:[1-3 days]] PROVIDER:[TOKEN:[[id number]],FOLLOWUP:[1-3 days],ESTABLISHEDPATIENT:[T]],PROVIDER:[TOKEN:[[id number]],FOLLOWUP:[1 month]],PROVIDER:[TOKEN:[[id number]],FOLLOWUP:[1-3 days],ESTABLISHEDPATIENT:[T]] PROVIDER:[TOKEN:[[id number]],FOLLOWUP:[1-3 days],ESTABLISHEDPATIENT:[T]],PROVIDER:[TOKEN:[[id number]],FOLLOWUP:[1 month]],PROVIDER:[TOKEN:[[id number]],FOLLOWUP:[1-3 days],ESTABLISHEDPATIENT:[T]]

## 2024-01-09 NOTE — DISCHARGE NOTE PROVIDER - NSDCFUADDAPPT_GEN_ALL_CORE_FT
APPTS ARE READY TO BE MADE: [ ] YES    Best Family or Patient Contact (if needed):    Additional Information about above appointments (if needed):    1:   2:   3:     Other comments or requests:    APPTS ARE READY TO BE MADE: [ x] YES    Best Family or Patient Contact (if needed):    Additional Information about above appointments (if needed):    1: Needs to see interventional cardiology before stopping aspirin in 1 week  2:   3:     Other comments or requests:    APPTS ARE READY TO BE MADE: [ x] YES    Best Family or Patient Contact (if needed):    Additional Information about above appointments (if needed):    1: Needs to see cardiology before stopping aspirin in 1 week !  2:   3:     Other comments or requests:

## 2024-01-09 NOTE — DISCHARGE NOTE NURSING/CASE MANAGEMENT/SOCIAL WORK - PATIENT PORTAL LINK FT
You can access the FollowMyHealth Patient Portal offered by [facility] by registering at the following website: [email or web address]. By joining [employer]’s FollowMyHealth portal, you will also be able to view your health information using other applications (apps) compatible with our system. You can access the FollowMyHealth Patient Portal offered by [facility] by registering at the following website: [email or web address]. By joining [employer]’s FollowMyHealth portal, you will also be able to view your health information using other applications (apps) compatible with our system. ,

## 2024-01-09 NOTE — PROVIDER CONTACT NOTE (CHANGE IN STATUS NOTIFICATION) - SITUATION
Pt stevo'ed down to the 40s nonsustaining.
Pt reported chest discomfort.
Pt complains of chest pain with no radiation to shoulders or neck, rates pain 4/10. SL Nitroglycerin x1 given

## 2024-01-09 NOTE — DISCHARGE NOTE PROVIDER - NPI NUMBER (FOR [employer] USE ONLY) :
[[id number]] [[id number]],[[id number] [[id number]],[[id number] [[id number]],[[id number]],[[id number]],[[id number]] [[id number]],[[id number]],[[id number]] [[id number]],[[id number]],[[id number]]

## 2024-01-09 NOTE — PROGRESS NOTE ADULT - ASSESSMENT
#ASHD s/p multiple pci: yesterday : S/p PCI - TITO to pLAD x1, mLAD x1, pLM x1 by Dr Dominguez.  Notes reflect had recurrent discomfort, seen by Dr Dominguez yesterday evening, no further intervention at this time. As per Dr Dominguez's team,  ASA/Plavix/Xarelto x 1 week then drop ASA. May resume Xarelto today 1/9.  # HFrEF (EF~50%, 11/26/23)  #A-fib on Xarelto  #HTN  #HL  #T2DM  #CKD: renal follow up noted, ok to restart entresto.   Remains stable cv perspective. 
68 M pmh pafib, CAD multiple PCIs w/ recent TITO to ostial LM ( 6/2/23), CKD3, IDDM2, CHF,  p/w CP being adm for NSTEMI
68 M pmh pafib, CAD multiple PCIs w/ recent TITO to ostial LM ( 6/2/23), CKD3, IDDM2, CHF,  p/w CP being adm for NSTEMI
68-year-old male PMH of CAD with 6 stents (most recent TITO to ostial LM on 6/2/23, HFrEF (EF~50%, 11/26/23), A-fib on Xarelto, HTN, HLD, T2DM, CKD3, former smoker, presenting with typical anginal symptoms.  No more chest pain for over 24 hours.    Wait til INR decreases to about 2.   Recommend renal consult to protect renal function. ARNi and lasix on hold for now.  Preparing for cardiac cath on 1/8/24  Hold Xarelto on 1/7/24 once cardiac cath is confirmed by invasive cardiology and renal agrees.  AF controlled.  Troponin is trending down  Continue atrovastatin and metoprolol.
68-year-old male PMH of CAD with 6 stents (most recent TITO to ostial LM on 6/2/23, HFrEF (EF~50%, 11/26/23), A-fib on Xarelto, HTN, HLD, T2DM, CKD3, former smoker, presenting with typical anginal symptoms.  No more chest pain for over 24 hours.    Wait til INR decreases to about 2.   Recommend renal consult to protect renal function. ARNi and lasix on hold for now.  Preparing for cardiac cath.  IF INR goes down will discuss with Dr. Dominguez cardiac cath for today  Hold Xarelto  once cardiac cath is confirmed by invasive cardiology and renal agrees.  AF controlled.  Troponin  trending down  Continue atrovastatin and metoprolol.
68 M pmh pafib, CAD multiple PCIs w/ recent TITO to ostial LM ( 6/2/23), CKD3, IDDM2, CHF,  p/w CP being adm for NSTEMI
68 M pmh pafib, CAD multiple PCIs w/ recent TITO to ostial LM ( 6/2/23), CKD3, IDDM2, CHF,  p/w CP being adm for NSTEMI

## 2024-01-09 NOTE — DISCHARGE NOTE PROVIDER - HOSPITAL COURSE
HPI:  68-year-old male PMH of CAD with 6 stents (most recent TITO to ostial LM on 6/2/23, HFrEF (EF~50%, 11/26/23), A-fib on Xarelto, HTN, HLD, T2DM, CKD3, former smoker, presents to ED complaining of progressively worsening exertional chest pain over the past 2 weeks culminating in an episode of chest pain at rest last night which was relieved after taking nitroglycerin. Last admission 11/25-11/29 for NSTEMI s/p POBA to Cx and pLAD. Pt with several episodes of chest pain recently; most recent episode last night around 10pm. Described as sharp pain across center of chest. No radiation to back or down arms; similar to the previous episodes of chest pain. Pt took a nitroglycerin which immediately resolves the pain. Pt also took ASA once for his chest pain. He is currently on plavix and xarelto (NOT aspirin) following his last hospitalization. No other symptoms besides chest pain. In ED, EKG showing afib without signs of acute ischemia. Cardiology called with no plans for emergent cath. Patient is currently chest pain free. Pt received 324mg of ASA in ED.   (05 Jan 2024 20:37)    Hospital Course:      Important Medication Changes and Reason:    Active or Pending Issues Requiring Follow-up:    Advanced Directives:   [ ] Full code  [ ] DNR  [ ] Hospice    Discharge Diagnoses:         HPI:  68-year-old male PMH of CAD with 6 stents (most recent TITO to ostial LM on 6/2/23, HFrEF (EF~50%, 11/26/23), A-fib on Xarelto, HTN, HLD, T2DM, CKD3, former smoker, presents to ED complaining of progressively worsening exertional chest pain over the past 2 weeks culminating in an episode of chest pain at rest last night which was relieved after taking nitroglycerin. Last admission 11/25-11/29 for NSTEMI s/p POBA to Cx and pLAD. Pt with several episodes of chest pain recently; most recent episode last night around 10pm. Described as sharp pain across center of chest. No radiation to back or down arms; similar to the previous episodes of chest pain. Pt took a nitroglycerin which immediately resolves the pain. Pt also took ASA once for his chest pain. He is currently on plavix and xarelto (NOT aspirin) following his last hospitalization. No other symptoms besides chest pain. In ED, EKG showing afib without signs of acute ischemia. Cardiology called with no plans for emergent cath. Patient is currently chest pain free. Pt received 324mg of ASA in ED.   (05 Jan 2024 20:37)    Hospital Course:      Important Medication Changes and Reason:    Active or Pending Issues Requiring Follow-up:  F/u PCP, cardiology, interventional cardiology, nephrology    Advanced Directives:   [x ] Full code  [ ] DNR  [ ] Hospice    Discharge Diagnoses:  Angina pectoris  ACS  NSTEMI  Afib  CHF         HPI:  68-year-old male PMH of CAD with 6 stents (most recent TITO to ostial LM on 6/2/23, HFrEF (EF~50%, 11/26/23), A-fib on Xarelto, HTN, HLD, T2DM, CKD3, former smoker, presents to ED complaining of progressively worsening exertional chest pain over the past 2 weeks culminating in an episode of chest pain at rest last night which was relieved after taking nitroglycerin. Last admission 11/25-11/29 for NSTEMI s/p POBA to Cx and pLAD. Pt with several episodes of chest pain recently; most recent episode last night around 10pm. Described as sharp pain across center of chest. No radiation to back or down arms; similar to the previous episodes of chest pain. Pt took a nitroglycerin which immediately resolves the pain. Pt also took ASA once for his chest pain. He is currently on plavix and xarelto (NOT aspirin) following his last hospitalization. No other symptoms besides chest pain. In ED, EKG showing afib without signs of acute ischemia. Cardiology called with no plans for emergent cath. Patient is currently chest pain free. Pt received 324mg of ASA in ED.   (05 Jan 2024 20:37)    Hospital Course:  HsT downtrending 231> 209. EKG: Afib, HR 70s, TWI in II, III, avf, v5,v6 which do not appear new. No MARQUES. qtc 	386. 1/6 Cath potentially Monday if INR < 2. Please hold Xarelto on 1/7 PM dose.  1/7 Coronary angiogram when renal function is back to baseline possible 1/8 1/8 INR dropped, plan for cath today. S/P TITO to pLAD 80% x1, mLAD 80% x1, and pLM 70% x1. ASA/Plavix/Xarelto x 1 week then drop ASA. May resume Xarelto 1/9. RFA sheath removal at 2000.   1/8: started on ASA 81mg stat dose and will con't tmr for a 7 days course.  1/9: xarelto resumed. dc hydral, re-start entresto per renal. cleared for discharge by Dr Mcdaniel. Follow-up with cardiologist prior to stopping aspirin.    Discharge planning discussed with attending Dr Zalta. Patient is medically cleared and stable for discharge. Medication Reconciliation reviewed with attending.    Important Medication Changes and Reason:  Aspirin 81 mg daily x 7 days total, 5 days left    Active or Pending Issues Requiring Follow-up:  F/u PCP, cardiology, nephrology    Advanced Directives:   [x ] Full code  [ ] DNR  [ ] Hospice    Discharge Diagnoses:  Angina pectoris  ACS  NSTEMI  Afib  CHF

## 2024-01-09 NOTE — DISCHARGE NOTE NURSING/CASE MANAGEMENT/SOCIAL WORK - NSDCPEFALRISK_GEN_ALL_CORE
For information on Fall & Injury Prevention, visit: [email or web address] OR  [email or web address] OR  https://www.cdc.gov/steadi/patient.html For information on Fall & Injury Prevention, visit: [email or web address] OR  [email or web address] OR  https://www.cdc.gov/steadi/patient.html

## 2024-01-09 NOTE — PROVIDER CONTACT NOTE (CHANGE IN STATUS NOTIFICATION) - RECOMMENDATIONS
Provider NP Shelbie Lee made aware.
Provider ACP Shelbie Lee made aware.
EKG done and provider notified.

## 2024-01-09 NOTE — PROVIDER CONTACT NOTE (CHANGE IN STATUS NOTIFICATION) - ASSESSMENT
Pt A&O4. VSS on RA. Asymptomatic, denies chest pain, palpitations, fatigue and SOB at this time.
A&Ox4, VSS, c/o discomfort, localized, not severe as per pt.
Pt A&O4. VSS on RA. Denies palpitations or SOB. Chest pain rating 4/10 with no radiation.

## 2024-01-09 NOTE — PROGRESS NOTE ADULT - SUBJECTIVE AND OBJECTIVE BOX
Date of service: 24 @ 17:22      Patient is a 68y old  Male who presents with a chief complaint of Chest pain (2024 10:19)                                                               INTERVAL HPI/OVERNIGHT EVENTS:    REVIEW OF SYSTEMS:     CONSTITUTIONAL: No weakness, fevers or chills  EYES/ENT: No visual changes , no ear ache   NECK: No pain or stiffness  RESPIRATORY: No cough, wheezing,  No shortness of breath  CARDIOVASCULAR: No chest pain or palpitations  GASTROINTESTINAL: No abdominal pain  . No nausea, vomiting, or hematemesis; No diarrhea or constipation. No melena or hematochezia.  GENITOURINARY: No dysuria, frequency or hematuria  NEUROLOGICAL: No numbness or weakness  SKIN: No itching, burning, rashes, or lesions                                                                                                                                                                                                                                                                                 Medications:  MEDICATIONS  (STANDING):  aspirin enteric coated 81 milliGRAM(s) Oral daily  atorvastatin 80 milliGRAM(s) Oral at bedtime  chlorhexidine 2% Cloths 1 Application(s) Topical daily  clopidogrel Tablet 75 milliGRAM(s) Oral daily  dextrose 5%. 1000 milliLiter(s) (50 mL/Hr) IV Continuous <Continuous>  dextrose 5%. 1000 milliLiter(s) (100 mL/Hr) IV Continuous <Continuous>  dextrose 50% Injectable 25 Gram(s) IV Push once  dextrose 50% Injectable 25 Gram(s) IV Push once  dextrose 50% Injectable 12.5 Gram(s) IV Push once  glucagon  Injectable 1 milliGRAM(s) IntraMuscular once  influenza  Vaccine (HIGH DOSE) 0.7 milliLiter(s) IntraMuscular once  insulin glargine Injectable (LANTUS) 20 Unit(s) SubCutaneous at bedtime  insulin lispro (ADMELOG) corrective regimen sliding scale   SubCutaneous at bedtime  insulin lispro (ADMELOG) corrective regimen sliding scale   SubCutaneous three times a day before meals  metoprolol tartrate 50 milliGRAM(s) Oral two times a day  rivaroxaban 15 milliGRAM(s) Oral with dinner  sacubitril 49 mG/valsartan 51 mG 1 Tablet(s) Oral two times a day    MEDICATIONS  (PRN):  acetaminophen     Tablet .. 650 milliGRAM(s) Oral every 6 hours PRN Temp greater or equal to 38C (100.4F), Moderate Pain (4 - 6)  dextrose Oral Gel 15 Gram(s) Oral once PRN Blood Glucose LESS THAN 70 milliGRAM(s)/deciliter       Allergies    No Known Allergies    Intolerances      Vital Signs Last 24 Hrs  T(C): 36.7 (2024 11:38), Max: 37.1 (2024 21:01)  T(F): 98.1 (2024 11:38), Max: 98.8 (2024 21:01)  HR: 91 (2024 11:38) (74 - 110)  BP: 113/55 (2024 11:38) (109/60 - 209/100)  BP(mean): --  RR: 18 (2024 11:38) (16 - 18)  SpO2: 98% (2024 11:38) (93% - 98%)    Parameters below as of 2024 11:38  Patient On (Oxygen Delivery Method): room air      CAPILLARY BLOOD GLUCOSE      POCT Blood Glucose.: 170 mg/dL (2024 11:56)  POCT Blood Glucose.: 85 mg/dL (2024 07:28)  POCT Blood Glucose.: 135 mg/dL (2024 21:05)  POCT Blood Glucose.: 155 mg/dL (2024 18:19)      -08 @ 07:01  -  01-09 @ 07:00  --------------------------------------------------------  IN: 440 mL / OUT: 220 mL / NET: 220 mL      Physical Exam:    Daily     Daily Weight in k.4 (2024 08:13)  General:  Well appearing, NAD, not cachetic  HEENT:  Nonicteric, PERRLA  CV:  RRR, S1S2   Lungs:  CTA B/L, no wheezes, rales, rhonchi  Abdomen:  Soft, non-tender, no distended, positive BS  Extremities:  2+ pulses, no c/c, no edema  Skin:  Warm and dry, no rashes  :  No moreno  Neuro:  AAOx3, non-focal, grossly intact                                                                                                                                                                                                                                                                                                LABS:                               15.3   12.22 )-----------( 177      ( 2024 06:29 )             45.0                      01-09    139  |  106  |  35<H>  ----------------------------<  112<H>  4.3   |  20<L>  |  1.62<H>    Ca    9.3      2024 06:29    TPro  5.6<L>  /  Alb  3.4  /  TBili  1.2  /  DBili  x   /  AST  25  /  ALT  14  /  AlkPhos  108                         RADIOLOGY & ADDITIONAL TESTS         I personally reviewed: [  ]EKG   [  ]CXR    [  ] CT      A/P:         Discussed with :     Derek consultants' Notes   Time spent :

## 2024-01-09 NOTE — PROGRESS NOTE ADULT - SUBJECTIVE AND OBJECTIVE BOX
Interventional Cardiology Post Cath Progress Note:                Subjective:   Patient feels well- no current complaints- Denies chest pain, shortness of breath. Denies pain, numbness, tingling or swelling around groin access site        MEDICATIONS  (STANDING):  aspirin enteric coated 81 milliGRAM(s) Oral daily  atorvastatin 80 milliGRAM(s) Oral at bedtime  chlorhexidine 2% Cloths 1 Application(s) Topical daily  clopidogrel Tablet 75 milliGRAM(s) Oral daily  dextrose 5%. 1000 milliLiter(s) (50 mL/Hr) IV Continuous <Continuous>  dextrose 5%. 1000 milliLiter(s) (100 mL/Hr) IV Continuous <Continuous>  dextrose 50% Injectable 12.5 Gram(s) IV Push once  dextrose 50% Injectable 25 Gram(s) IV Push once  dextrose 50% Injectable 25 Gram(s) IV Push once  glucagon  Injectable 1 milliGRAM(s) IntraMuscular once  influenza  Vaccine (HIGH DOSE) 0.7 milliLiter(s) IntraMuscular once  insulin glargine Injectable (LANTUS) 20 Unit(s) SubCutaneous at bedtime  insulin lispro (ADMELOG) corrective regimen sliding scale   SubCutaneous at bedtime  insulin lispro (ADMELOG) corrective regimen sliding scale   SubCutaneous three times a day before meals  metoprolol tartrate 50 milliGRAM(s) Oral two times a day  rivaroxaban 15 milliGRAM(s) Oral with dinner  sacubitril 49 mG/valsartan 51 mG 1 Tablet(s) Oral two times a day  MEDICATIONS  (PRN):  acetaminophen     Tablet .. 650 milliGRAM(s) Oral every 6 hours PRN Temp greater or equal to 38C (100.4F), Moderate Pain (4 - 6)  dextrose Oral Gel 15 Gram(s) Oral once PRN Blood Glucose LESS THAN 70 milliGRAM(s)/deciliter      Objective:  Vital Signs Last 24 Hrs  T(C): 37.1 (08 Jan 2024 21:01), Max: 37.3 (08 Jan 2024 10:59)  T(F): 98.8 (08 Jan 2024 21:01), Max: 99.2 (08 Jan 2024 10:59)  HR: 74 (09 Jan 2024 03:58) (65 - 110)  BP: 117/55 (09 Jan 2024 03:58) (117/55 - 209/100)  BP(mean): --  RR: 18 (09 Jan 2024 03:58) (16 - 18)  SpO2: 93% (09 Jan 2024 03:58) (93% - 98%)    Parameters below as of 09 Jan 2024 03:58  Patient On (Oxygen Delivery Method): room air        01-08-24 @ 07:01  -  01-09-24 @ 07:00  --------------------------------------------------------  IN: 440 mL / OUT: 220 mL / NET: 220 mL                              15.3   12.22 )-----------( 177      ( 09 Jan 2024 06:29 )             45.0     01-09    139  |  106  |  35<H>  ----------------------------<  112<H>  4.3   |  20<L>  |  1.62<H>    Ca    9.3      09 Jan 2024 06:29    TPro  5.6<L>  /  Alb  3.4  /  TBili  1.2  /  DBili  x   /  AST  25  /  ALT  14  /  AlkPhos  108  01-08    PT/INR - ( 09 Jan 2024 06:29 )   PT: 17.4 sec;   INR: 1.68 ratio         PTT - ( 09 Jan 2024 06:29 )  PTT:36.7 sec  Urinalysis Basic - ( 09 Jan 2024 06:29 )    Color: x / Appearance: x / SG: x / pH: x  Gluc: 112 mg/dL / Ketone: x  / Bili: x / Urobili: x   Blood: x / Protein: x / Nitrite: x   Leuk Esterase: x / RBC: x / WBC x   Sq Epi: x / Non Sq Epi: x / Bacteria: x      Physical Exam:  No apparent distress, alert and oriented times three, appropriate affect  JVD is not elevated, supple  Clear to auscultation with no wheezing, ronchi or crackles  Regular rate and rhythm with no murmur, rub or gallop  Positive bowel sounds, soft, non-tender, non-distended, no masses/guarding or rebound tenderness  Right groin: Soft, non tender, no bleeding or hematoma, clean/dry/intact, +2 pulses      Assessment/Plan:  69 y/o M with Pmhx of CAD w/ 6 stents, HFrEF (EF~50%, 11/26/23), AFib on Xarelto, HTN, HLD, T2DM, CKD3, former smoker, arelto,  presented to ED c/o progressively worsening exertional CP   s/p Cath 1/8: TITO to pLAD 80% x1, mLAD 80% x1, and pLM 70% x1. ASA/Plavix/Xarelto x 1 week then drop ASA. May resume Xarelto today 1/9. RFA sheath removed - site stable      - Groin site stable.   - ASA/Plavix/Xarelto for 1 week then drop ASA and continue Xarelto and Plavix  - Continue atorvastatin  - Recommend a heart healthy diet which includes a variety of fruits and vegetables, whole grains, low fat dairy products, legumes and skinless poultry and fish; food prepared with little or no salt and minimize processed foods  - Avoid using NSAIDs  (Aleve, Motrin, ibuprofen, naproxen) while on DAPT, please utilize Tylenol for pain control (not to exceed 4gm in 24 hours)  - Care per primary team    Right femoral site soft, non tender, no hematoma or bleeding, instructions reviewed with pt.  ALL other care and medical management as per primary team.      Lui KEARNEY PA-C  Cardiology Cath Service    Please check [email or web address] password [email or web address] for cardiology service schedule and contact information via TEAMS.                                                                                             Interventional Cardiology Post Cath Progress Note:                Subjective:   Patient feels well- no current complaints- Denies chest pain, shortness of breath. Denies pain, numbness, tingling or swelling around groin access site        MEDICATIONS  (STANDING):  aspirin enteric coated 81 milliGRAM(s) Oral daily  atorvastatin 80 milliGRAM(s) Oral at bedtime  chlorhexidine 2% Cloths 1 Application(s) Topical daily  clopidogrel Tablet 75 milliGRAM(s) Oral daily  dextrose 5%. 1000 milliLiter(s) (50 mL/Hr) IV Continuous <Continuous>  dextrose 5%. 1000 milliLiter(s) (100 mL/Hr) IV Continuous <Continuous>  dextrose 50% Injectable 12.5 Gram(s) IV Push once  dextrose 50% Injectable 25 Gram(s) IV Push once  dextrose 50% Injectable 25 Gram(s) IV Push once  glucagon  Injectable 1 milliGRAM(s) IntraMuscular once  influenza  Vaccine (HIGH DOSE) 0.7 milliLiter(s) IntraMuscular once  insulin glargine Injectable (LANTUS) 20 Unit(s) SubCutaneous at bedtime  insulin lispro (ADMELOG) corrective regimen sliding scale   SubCutaneous at bedtime  insulin lispro (ADMELOG) corrective regimen sliding scale   SubCutaneous three times a day before meals  metoprolol tartrate 50 milliGRAM(s) Oral two times a day  rivaroxaban 15 milliGRAM(s) Oral with dinner  sacubitril 49 mG/valsartan 51 mG 1 Tablet(s) Oral two times a day  MEDICATIONS  (PRN):  acetaminophen     Tablet .. 650 milliGRAM(s) Oral every 6 hours PRN Temp greater or equal to 38C (100.4F), Moderate Pain (4 - 6)  dextrose Oral Gel 15 Gram(s) Oral once PRN Blood Glucose LESS THAN 70 milliGRAM(s)/deciliter      Objective:  Vital Signs Last 24 Hrs  T(C): 37.1 (08 Jan 2024 21:01), Max: 37.3 (08 Jan 2024 10:59)  T(F): 98.8 (08 Jan 2024 21:01), Max: 99.2 (08 Jan 2024 10:59)  HR: 74 (09 Jan 2024 03:58) (65 - 110)  BP: 117/55 (09 Jan 2024 03:58) (117/55 - 209/100)  BP(mean): --  RR: 18 (09 Jan 2024 03:58) (16 - 18)  SpO2: 93% (09 Jan 2024 03:58) (93% - 98%)    Parameters below as of 09 Jan 2024 03:58  Patient On (Oxygen Delivery Method): room air        01-08-24 @ 07:01  -  01-09-24 @ 07:00  --------------------------------------------------------  IN: 440 mL / OUT: 220 mL / NET: 220 mL                              15.3   12.22 )-----------( 177      ( 09 Jan 2024 06:29 )             45.0     01-09    139  |  106  |  35<H>  ----------------------------<  112<H>  4.3   |  20<L>  |  1.62<H>    Ca    9.3      09 Jan 2024 06:29    TPro  5.6<L>  /  Alb  3.4  /  TBili  1.2  /  DBili  x   /  AST  25  /  ALT  14  /  AlkPhos  108  01-08    PT/INR - ( 09 Jan 2024 06:29 )   PT: 17.4 sec;   INR: 1.68 ratio         PTT - ( 09 Jan 2024 06:29 )  PTT:36.7 sec  Urinalysis Basic - ( 09 Jan 2024 06:29 )    Color: x / Appearance: x / SG: x / pH: x  Gluc: 112 mg/dL / Ketone: x  / Bili: x / Urobili: x   Blood: x / Protein: x / Nitrite: x   Leuk Esterase: x / RBC: x / WBC x   Sq Epi: x / Non Sq Epi: x / Bacteria: x      Physical Exam:  No apparent distress, alert and oriented times three, appropriate affect  JVD is not elevated, supple  Clear to auscultation with no wheezing, ronchi or crackles  Regular rate and rhythm with no murmur, rub or gallop  Positive bowel sounds, soft, non-tender, non-distended, no masses/guarding or rebound tenderness  Right groin: Soft, non tender, no bleeding or hematoma, clean/dry/intact, +2 pulses      Assessment/Plan:  67 y/o M with Pmhx of CAD w/ 6 stents, HFrEF (EF~50%, 11/26/23), AFib on Xarelto, HTN, HLD, T2DM, CKD3, former smoker, arelto,  presented to ED c/o progressively worsening exertional CP   s/p Cath 1/8: TITO to pLAD 80% x1, mLAD 80% x1, and pLM 70% x1. ASA/Plavix/Xarelto x 1 week then drop ASA. May resume Xarelto today 1/9. RFA sheath removed - site stable      - Groin site stable.   - ASA/Plavix/Xarelto for 1 week then drop ASA and continue Xarelto and Plavix  - Continue atorvastatin  - Recommend a heart healthy diet which includes a variety of fruits and vegetables, whole grains, low fat dairy products, legumes and skinless poultry and fish; food prepared with little or no salt and minimize processed foods  - Avoid using NSAIDs  (Aleve, Motrin, ibuprofen, naproxen) while on DAPT, please utilize Tylenol for pain control (not to exceed 4gm in 24 hours)  - Care per primary team    Right femoral site soft, non tender, no hematoma or bleeding, instructions reviewed with pt.  ALL other care and medical management as per primary team.      Lui KEARNEY PA-C  Cardiology Cath Service    Please check [email or web address] password [email or web address] for cardiology service schedule and contact information via TEAMS.

## 2024-01-09 NOTE — PHARMACOTHERAPY INTERVENTION NOTE - COMMENTS
Counseled patient on discharge medication doses, indications, and possible side effects. Provided and reviewed medication cards. Answered all of the patient's questions to the best of my ability. Patient exhibited understanding of discharge medication regimen.     aspirin enteric coated 81 milliGRAM(s) Oral daily  atorvastatin 80 milliGRAM(s) Oral at bedtime  clopidogrel Tablet 75 milliGRAM(s) Oral daily  metoprolol tartrate 50 milliGRAM(s) Oral two times a day  rivaroxaban 15 milliGRAM(s) Oral with dinner  sacubitril 49 mG/valsartan 51 mG 1 Tablet(s) Oral two times a day    Avoid taking any NSAIDs such as motrin/ibuproben or aleve/naproxen for any headaches or pain. Advised to take acetaminophen if it is necessary.     Christophe (Eh Vance) Jose  Transitions of Care Pharmacist  Available on [employer] Teams (Preferred)  Spectra: [phone]      Time spent: 20 minutes  Counseled patient on discharge medication doses, indications, and possible side effects. Provided and reviewed medication cards. Answered all of the patient's questions to the best of my ability. Patient exhibited understanding of discharge medication regimen.     aspirin enteric coated 81 milliGRAM(s) Oral daily  atorvastatin 80 milliGRAM(s) Oral at bedtime  clopidogrel Tablet 75 milliGRAM(s) Oral daily  metoprolol tartrate 50 milliGRAM(s) Oral two times a day  rivaroxaban 15 milliGRAM(s) Oral with dinner  sacubitril 49 mG/valsartan 51 mG 1 Tablet(s) Oral two times a day    Avoid taking any NSAIDs such as motrin/ibuproben or aleve/naproxen for any headaches or pain. Advised to take acetaminophen if it is necessary.     Christophe (Eh Vance) Jose  Transitions of Care Pharmacist  Available on [employer] Teams (Preferred)  Spectra: [phone]      Time spent: 20 minutes

## 2024-01-09 NOTE — DISCHARGE NOTE PROVIDER - CARE PROVIDER_API CALL
Noa Carl  Family Medicine  [address] [facility], [address] [address]  Phone: [phone]  Fax: [phone]  Established Patient  Follow Up Time: 1-3 days    Damion Knig  Adv Heart Fail Trnsplnt Cardio  [address] [place] [address] - [place] [address]  Phone: [phone]  Fax: [phone]  Established Patient  Follow Up Time: 1-3 days    Markell Walton  Nephrology  [address] [facility], [address] [address]  Phone: [phone]  Fax: [phone]  Follow Up Time: 1 month   Noa Carl  Family Medicine  [address] [facility], [address] [address]  Phone: [phone]  Fax: [phone]  Established Patient  Follow Up Time: 1-3 days    Damion King  Adv Heart Fail Trnsplnt Cardio  [address] [place] [address] - [place] [address]  Phone: [phone]  Fax: [phone]  Established Patient  Follow Up Time: 1-3 days    Markell Walton  Nephrology  [address] [facility], [address] [address]  Phone: [phone]  Fax: [phone]  Follow Up Time: 1 month   Noa Carl  Family Medicine  [address] [facility], [address]  [place] [address]  Phone: [phone]  Fax: [phone]  Established Patient  Follow Up Time: 1-3 days    Damion King  Adv Heart Fail Trnsplnt Cardio  [address] [place] [address] - [place] [address]  Phone: [phone]  Fax: [phone]  Established Patient  Follow Up Time: 1-3 days    Markell Walton  Nephrology  [address] [facility], [address] [address]  Phone: [phone]  Fax: [phone]  Follow Up Time: 1 month    Julito Dominguez  Interventional Cardiology  [address] [place] [address]  Phone: [phone]  Fax: [phone]  Follow Up Time: 1-3 days   Noa Carl  Family Medicine  [address] [facility], [address]  [place] [address]  Phone: [phone]  Fax: [phone]  Established Patient  Follow Up Time: 1-3 days    Damion King  Adv Heart Fail Trnsplnt Cardio  [address] [place] [address] - [place] [address]  Phone: [phone]  Fax: [phone]  Established Patient  Follow Up Time: 1-3 days    Markell Walton  Nephrology  [address] [facility], [address] [address]  Phone: [phone]  Fax: [phone]  Follow Up Time: 1 month    Julito Dominguez  Interventional Cardiology  [address] [place] [address]  Phone: [phone]  Fax: [phone]  Follow Up Time: 1-3 days   Noa Carl  Family Medicine  [address] [facility], [address] [address]  Phone: [phone]  Fax: [phone]  Established Patient  Follow Up Time: 1-3 days    Markell Walton  Nephrology  [address] [facility], [address] [address]  Phone: [phone]  Fax: [phone]  Follow Up Time: 1 month    Alfred Moon  Cardiology  [address] [place] [address]  Phone: [phone]  Fax: [phone]  Established Patient  Follow Up Time: 1-3 days   Noa Carl  Family Medicine  [address] [facility], [address] [address]  Phone: [phone]  Fax: [phone]  Established Patient  Follow Up Time: 1-3 days    Markell Walton  Nephrology  [address] [facility], [address] [address]  Phone: [phone]  Fax: [phone]  Follow Up Time: 1 month    Alfred Moon  Cardiology  [address] [place] [address]  Phone: [phone]  Fax: [phone]  Established Patient  Follow Up Time: 1-3 days

## 2024-01-09 NOTE — PROGRESS NOTE ADULT - PROBLEM SELECTOR PLAN 1
plan for cath   NSTEMI   trop tending down   cont meds  fu with cardio   hold xarelto   monitor INR
plan for cath   NSTEMI   trop tending down   cont meds  fu with cardio   hold xarelto   monitor INR
plan for cath : today   NSTEMI   trop tending down   cont meds  fu with cardio   hold xarelto   monitor INR
plan for cath : today   NSTEMI   trop tending down   cont meds   discussed with cardio : LM bifurication has been difficult to deal with   LM , LAD and CX has been a work in progress per Dr. Dominguez ,,,    no strightforward restenosis  but stent edge lesions

## 2024-01-09 NOTE — DISCHARGE NOTE NURSING/CASE MANAGEMENT/SOCIAL WORK - NSDCFUADDAPPT_GEN_ALL_CORE_FT
APPTS ARE READY TO BE MADE: [ x] YES    Best Family or Patient Contact (if needed):    Additional Information about above appointments (if needed):    1: Needs to see cardiology before stopping aspirin in 1 week !  2:   3:     Other comments or requests:

## 2024-01-09 NOTE — DISCHARGE NOTE PROVIDER - NSDCCPTREATMENT_GEN_ALL_CORE_FT
PRINCIPAL PROCEDURE  Procedure: Left heart cardiac catheterization  Findings and Treatment: Conclusions:   Successful TITO x3 to pLM, proximal LAD and mid LAD lesions    Recommendations:   Triple therapy x 1 week then Plavix/Xarelto thereafter.

## 2024-01-09 NOTE — PROGRESS NOTE ADULT - SUBJECTIVE AND OBJECTIVE BOX
Overnight events noted      VITAL:  T(C): , Max: 37.3 (01-08-24 @ 10:59)  T(F): , Max: 99.2 (01-08-24 @ 10:59)  HR: 74 (01-09-24 @ 03:58)  BP: 117/55 (01-09-24 @ 03:58)  BP(mean): --  RR: 18 (01-09-24 @ 03:58)  SpO2: 93% (01-09-24 @ 03:58)  Wt(kg): --      PHYSICAL EXAM:  Constitutional: NAD, Alert  HEENT: NCAT, MMM  Neck: Supple, No JVD  Respiratory: CTA-b/l  Cardiovascular: RRR s1s2, no m/r/g  Gastrointestinal: BS+, soft, NT/ND  Extremities: No peripheral edema b/l  Neurological: no focal deficits; strength grossly intact  Back: no CVAT b/l  Skin: No rashes, no nevi    LABS:                        15.3   12.22 )-----------( 177      ( 09 Jan 2024 06:29 )             45.0     Na(139)/K(4.3)/Cl(106)/HCO3(20)/BUN(35)/Cr(1.62)Glu(112)/Ca(9.3)/Mg(--)/PO4(--)    01-09 @ 06:29  Na(142)/K(3.8)/Cl(107)/HCO3(20)/BUN(46)/Cr(1.96)Glu(71)/Ca(9.2)/Mg(--)/PO4(--)    01-08 @ 07:24  Na(139)/K(3.8)/Cl(105)/HCO3(21)/BUN(45)/Cr(2.05)Glu(61)/Ca(9.4)/Mg(--)/PO4(--)    01-07 @ 07:23    Sodium, Random Urine: 42 mmol/L (01-07 @ 21:45)  Creatinine, Random Urine: 68 mg/dL (01-07 @ 21:45)  Protein/Creatinine Ratio Calculation: 0.1 Ratio (01-07 @ 21:45)    CATH 1/8/24  S/P TITO to pLAD 80% x1, mLAD 80% x1, and pLM 70% x1      IMPRESSION: 68M HTN, DM2, CKD, AFib, CAD, and HFpEF, 1/5/24 p/w chest pain/troponinemia  (1)CKD - nonproteinuric CKD3b - due to hypertension/atherosclerotic disease - no evidence to date of DENNIS, s/p cath yesterday  (2)Lytes - acceptable  (3)Cardiac - NSTEMI - s/p PCI 1/8/24    RECOMMEND:  (1)Can restart Entresto as well as Lasix, as taken prior to admission  (2)No renal objection to discharge, with BMP in 3-5 days, and f/u at my office in 1-4 weeks        Markell Walton MD  [facility]  Office/on call physician: [phone]  Cell (7a-7p): [phone]       Overnight events noted      VITAL:  T(C): , Max: 37.3 (01-08-24 @ 10:59)  T(F): , Max: 99.2 (01-08-24 @ 10:59)  HR: 74 (01-09-24 @ 03:58)  BP: 117/55 (01-09-24 @ 03:58)  BP(mean): --  RR: 18 (01-09-24 @ 03:58)  SpO2: 93% (01-09-24 @ 03:58)  Wt(kg): --      PHYSICAL EXAM:  Constitutional: NAD, Alert  HEENT: NCAT, MMM  Neck: Supple, No JVD  Respiratory: CTA-b/l  Cardiovascular: RRR s1s2, no m/r/g  Gastrointestinal: BS+, soft, NT/ND  Extremities: No peripheral edema b/l  Neurological: no focal deficits; strength grossly intact  Back: no CVAT b/l  Skin: No rashes, no nevi    LABS:                        15.3   12.22 )-----------( 177      ( 09 Jan 2024 06:29 )             45.0     Na(139)/K(4.3)/Cl(106)/HCO3(20)/BUN(35)/Cr(1.62)Glu(112)/Ca(9.3)/Mg(--)/PO4(--)    01-09 @ 06:29  Na(142)/K(3.8)/Cl(107)/HCO3(20)/BUN(46)/Cr(1.96)Glu(71)/Ca(9.2)/Mg(--)/PO4(--)    01-08 @ 07:24  Na(139)/K(3.8)/Cl(105)/HCO3(21)/BUN(45)/Cr(2.05)Glu(61)/Ca(9.4)/Mg(--)/PO4(--)    01-07 @ 07:23    Sodium, Random Urine: 42 mmol/L (01-07 @ 21:45)  Creatinine, Random Urine: 68 mg/dL (01-07 @ 21:45)  Protein/Creatinine Ratio Calculation: 0.1 Ratio (01-07 @ 21:45)    CATH 1/8/24  S/P TITO to pLAD 80% x1, mLAD 80% x1, and pLM 70% x1      IMPRESSION: 68M HTN, DM2, CKD, AFib, CAD, and HFpEF, 1/5/24 p/w chest pain/troponinemia  (1)CKD - nonproteinuric CKD3b - due to hypertension/atherosclerotic disease - no evidence to date of DENINS, s/p cath yesterday  (2)Lytes - acceptable  (3)Cardiac - NSTEMI - s/p PCI 1/8/24    RECOMMEND:  (1)Can restart Entresto as well as Lasix, as taken prior to admission  (2)No renal objection to discharge, with BMP in 3-5 days, and f/u at my office in 1-4 weeks        Markell Walton MD  [facility]  Office/on call physician: [phone]  Cell (7a-7p): [phone]       Overnight events noted      VITAL:  T(C): , Max: 37.3 (01-08-24 @ 10:59)  T(F): , Max: 99.2 (01-08-24 @ 10:59)  HR: 74 (01-09-24 @ 03:58)  BP: 117/55 (01-09-24 @ 03:58)  BP(mean): --  RR: 18 (01-09-24 @ 03:58)  SpO2: 93% (01-09-24 @ 03:58)  Wt(kg): --      PHYSICAL EXAM:  Constitutional: NAD, Alert  HEENT: NCAT, MMM  Neck: Supple, No JVD  Respiratory: CTA-b/l  Cardiovascular: RRR s1s2, no m/r/g  Gastrointestinal: BS+, soft, NT/ND  Extremities: No peripheral edema b/l  Neurological: no focal deficits; strength grossly intact  Back: no CVAT b/l  Skin: No rashes, no nevi    LABS:                        15.3   12.22 )-----------( 177      ( 09 Jan 2024 06:29 )             45.0     Na(139)/K(4.3)/Cl(106)/HCO3(20)/BUN(35)/Cr(1.62)Glu(112)/Ca(9.3)/Mg(--)/PO4(--)    01-09 @ 06:29  Na(142)/K(3.8)/Cl(107)/HCO3(20)/BUN(46)/Cr(1.96)Glu(71)/Ca(9.2)/Mg(--)/PO4(--)    01-08 @ 07:24  Na(139)/K(3.8)/Cl(105)/HCO3(21)/BUN(45)/Cr(2.05)Glu(61)/Ca(9.4)/Mg(--)/PO4(--)    01-07 @ 07:23    Sodium, Random Urine: 42 mmol/L (01-07 @ 21:45)  Creatinine, Random Urine: 68 mg/dL (01-07 @ 21:45)  Protein/Creatinine Ratio Calculation: 0.1 Ratio (01-07 @ 21:45)    CATH 1/8/24  S/P TITO to pLAD 80% x1, mLAD 80% x1, and pLM 70% x1      IMPRESSION: 68M HTN, DM2, CKD, AFib, CAD, and HFpEF, 1/5/24 p/w chest pain/troponinemia  (1)CKD - nonproteinuric CKD3b - due to hypertension/atherosclerotic disease - no evidence to date of DENNIS, s/p cath yesterday  (2)Lytes - acceptable  (3)Cardiac - NSTEMI - s/p PCI 1/8/24    RECOMMEND:  (1)Can restart Entresto 49/51 bid and discontinue Hydralazine  (2)No renal objection to discharge, with BMP in 3-5 days, and f/u at my office in 1-4 weeks        Markell Walton MD  [facility]  Office/on call physician: [phone]  Cell (7a-7p): [phone]       Overnight events noted      VITAL:  T(C): , Max: 37.3 (01-08-24 @ 10:59)  T(F): , Max: 99.2 (01-08-24 @ 10:59)  HR: 74 (01-09-24 @ 03:58)  BP: 117/55 (01-09-24 @ 03:58)  BP(mean): --  RR: 18 (01-09-24 @ 03:58)  SpO2: 93% (01-09-24 @ 03:58)  Wt(kg): --      PHYSICAL EXAM:  Constitutional: NAD, Alert  HEENT: NCAT, MMM  Neck: Supple, No JVD  Respiratory: CTA-b/l  Cardiovascular: RRR s1s2, no m/r/g  Gastrointestinal: BS+, soft, NT/ND  Extremities: No peripheral edema b/l  Neurological: no focal deficits; strength grossly intact  Back: no CVAT b/l  Skin: No rashes, no nevi    LABS:                        15.3   12.22 )-----------( 177      ( 09 Jan 2024 06:29 )             45.0     Na(139)/K(4.3)/Cl(106)/HCO3(20)/BUN(35)/Cr(1.62)Glu(112)/Ca(9.3)/Mg(--)/PO4(--)    01-09 @ 06:29  Na(142)/K(3.8)/Cl(107)/HCO3(20)/BUN(46)/Cr(1.96)Glu(71)/Ca(9.2)/Mg(--)/PO4(--)    01-08 @ 07:24  Na(139)/K(3.8)/Cl(105)/HCO3(21)/BUN(45)/Cr(2.05)Glu(61)/Ca(9.4)/Mg(--)/PO4(--)    01-07 @ 07:23    Sodium, Random Urine: 42 mmol/L (01-07 @ 21:45)  Creatinine, Random Urine: 68 mg/dL (01-07 @ 21:45)  Protein/Creatinine Ratio Calculation: 0.1 Ratio (01-07 @ 21:45)    CATH 1/8/24  S/P TITO to pLAD 80% x1, mLAD 80% x1, and pLM 70% x1      IMPRESSION: 68M HTN, DM2, CKD, AFib, CAD, and HFpEF, 1/5/24 p/w chest pain/troponinemia  (1)CKD - nonproteinuric CKD3b - due to hypertension/atherosclerotic disease - no evidence to date of DENNIS, s/p cath yesterday  (2)Lytes - acceptable  (3)Cardiac - NSTEMI - s/p PCI 1/8/24    RECOMMEND:  (1)Can restart Entresto 49/51 bid and discontinue Hydralazine  (2)No renal objection to discharge, with BMP in 3-5 days, and f/u at my office in 1-4 weeks        Markell Walton MD  [facility]  Office/on call physician: [phone]  Cell (7a-7p): [phone]       No pain, no sob  Voiding well      VITAL:  T(C): , Max: 37.3 (01-08-24 @ 10:59)  T(F): , Max: 99.2 (01-08-24 @ 10:59)  HR: 74 (01-09-24 @ 03:58)  BP: 117/55 (01-09-24 @ 03:58)  BP(mean): --  RR: 18 (01-09-24 @ 03:58)  SpO2: 93% (01-09-24 @ 03:58)  Wt(kg): --      PHYSICAL EXAM:  Constitutional: NAD, Alert  HEENT: NCAT, MMM  Neck: Supple, No JVD  Respiratory: CTA-b/l  Cardiovascular: RRR s1s2, no m/r/g  Gastrointestinal: BS+, soft, NT/ND  Extremities: No peripheral edema b/l  Neurological: no focal deficits; strength grossly intact  Back: no CVAT b/l  Skin: No rashes, no nevi    LABS:                        15.3   12.22 )-----------( 177      ( 09 Jan 2024 06:29 )             45.0     Na(139)/K(4.3)/Cl(106)/HCO3(20)/BUN(35)/Cr(1.62)Glu(112)/Ca(9.3)/Mg(--)/PO4(--)    01-09 @ 06:29  Na(142)/K(3.8)/Cl(107)/HCO3(20)/BUN(46)/Cr(1.96)Glu(71)/Ca(9.2)/Mg(--)/PO4(--)    01-08 @ 07:24  Na(139)/K(3.8)/Cl(105)/HCO3(21)/BUN(45)/Cr(2.05)Glu(61)/Ca(9.4)/Mg(--)/PO4(--)    01-07 @ 07:23    Sodium, Random Urine: 42 mmol/L (01-07 @ 21:45)  Creatinine, Random Urine: 68 mg/dL (01-07 @ 21:45)  Protein/Creatinine Ratio Calculation: 0.1 Ratio (01-07 @ 21:45)    CATH 1/8/24  S/P TITO to pLAD 80% x1, mLAD 80% x1, and pLM 70% x1      IMPRESSION: 68M HTN, DM2, CKD, AFib, CAD, and HFpEF, 1/5/24 p/w chest pain/troponinemia  (1)CKD - nonproteinuric CKD3b - due to hypertension/atherosclerotic disease - no evidence to date of DENNIS, s/p cath yesterday  (2)Lytes - acceptable  (3)Cardiac - NSTEMI - s/p PCI 1/8/24    RECOMMEND:  (1)Can restart Entresto 49/51 bid and discontinue Hydralazine  (2)No renal objection to discharge, with BMP in 3-5 days, and f/u at my office in 1-4 weeks        Markell Walton MD  [facility]  Office/on call physician: [phone]  Cell (7a-7p): [phone]       No pain, no sob  Voiding well      VITAL:  T(C): , Max: 37.3 (01-08-24 @ 10:59)  T(F): , Max: 99.2 (01-08-24 @ 10:59)  HR: 74 (01-09-24 @ 03:58)  BP: 117/55 (01-09-24 @ 03:58)  BP(mean): --  RR: 18 (01-09-24 @ 03:58)  SpO2: 93% (01-09-24 @ 03:58)  Wt(kg): --      PHYSICAL EXAM:  Constitutional: NAD, Alert  HEENT: NCAT, MMM  Neck: Supple, No JVD  Respiratory: CTA-b/l  Cardiovascular: RRR s1s2, no m/r/g  Gastrointestinal: BS+, soft, NT/ND  Extremities: No peripheral edema b/l  Neurological: no focal deficits; strength grossly intact  Back: no CVAT b/l  Skin: No rashes, no nevi    LABS:                        15.3   12.22 )-----------( 177      ( 09 Jan 2024 06:29 )             45.0     Na(139)/K(4.3)/Cl(106)/HCO3(20)/BUN(35)/Cr(1.62)Glu(112)/Ca(9.3)/Mg(--)/PO4(--)    01-09 @ 06:29  Na(142)/K(3.8)/Cl(107)/HCO3(20)/BUN(46)/Cr(1.96)Glu(71)/Ca(9.2)/Mg(--)/PO4(--)    01-08 @ 07:24  Na(139)/K(3.8)/Cl(105)/HCO3(21)/BUN(45)/Cr(2.05)Glu(61)/Ca(9.4)/Mg(--)/PO4(--)    01-07 @ 07:23    Sodium, Random Urine: 42 mmol/L (01-07 @ 21:45)  Creatinine, Random Urine: 68 mg/dL (01-07 @ 21:45)  Protein/Creatinine Ratio Calculation: 0.1 Ratio (01-07 @ 21:45)    CATH 1/8/24  S/P TITO to pLAD 80% x1, mLAD 80% x1, and pLM 70% x1      IMPRESSION: 68M HTN, DM2, CKD, AFib, CAD, and HFpEF, 1/5/24 p/w chest pain/troponinemia  (1)CKD - nonproteinuric CKD3b - due to hypertension/atherosclerotic disease - no evidence to date of DENNIS, s/p cath yesterday  (2)Lytes - acceptable  (3)Cardiac - NSTEMI - s/p PCI 1/8/24    RECOMMEND:  (1)Can restart Entresto 49/51 bid and discontinue Hydralazine  (2)No renal objection to discharge, with BMP in 3-5 days, and f/u at my office in 1-4 weeks        Markell Walton MD  [facility]  Office/on call physician: [phone]  Cell (7a-7p): [phone]

## 2024-01-09 NOTE — DISCHARGE NOTE PROVIDER - CARE PROVIDERS DIRECT ADDRESSES
,[email or web address] / [email or web address] ,[email or web address] ,[email or web address] Northwest Behavioral HealthAviga SystemsUniversity of Mississippi Medical CenterCJ Overstreet [email or web address] Wake Forest Baptist Wilkes Medical CenterCasa Systems,[email or web address] ,[email or web address] [email or web address] [email or web address]. Dominic HospitalMoogi,[email or web address] Regional Medical CenterQuest [email or web address] ,[email or web address] [email or web address] LabsForrest General HospitalVizi LabsdirectweeSpring.[employer],DirectAddress_Unknown ,[email or web address] [email or web address],[email or web address] Children's HospitalConfovisRegency MeridianConfovisdirectLyrically [employer]ounge.[employer],DirectAddress_Unknown

## 2024-01-09 NOTE — PROGRESS NOTE ADULT - PROBLEM SELECTOR PROBLEM 3
CAD S/P percutaneous coronary angioplasty
declines

## 2024-01-09 NOTE — PROGRESS NOTE ADULT - SUBJECTIVE AND OBJECTIVE BOX
SUBJECTIVE: Asymptomatic in bed.   	  MEDICATIONS:  metoprolol tartrate 50 milliGRAM(s) Oral two times a day  sacubitril 49 mG/valsartan 51 mG 1 Tablet(s) Oral two times a day  acetaminophen   Tablet .. 650 milliGRAM(s) Oral every 6 hours PRN  atorvastatin 80 milliGRAM(s) Oral at bedtime  dextrose 50% Injectable 12.5 Gram(s) IV Push once  dextrose 50% Injectable 25 Gram(s) IV Push once  dextrose 50% Injectable 25 Gram(s) IV Push once  dextrose Oral Gel 15 Gram(s) Oral once PRN  glucagon  Injectable 1 milliGRAM(s) IntraMuscular once  insulin glargine Injectable (LANTUS) 20 Unit(s) SubCutaneous at bedtime  insulin lispro (ADMELOG) corrective regimen sliding scale   SubCutaneous three times a day before meals  insulin lispro (ADMELOG) corrective regimen sliding scale   SubCutaneous at bedtime  aspirin enteric coated 81 milliGRAM(s) Oral daily  chlorhexidine 2% Cloths 1 Application(s) Topical daily  clopidogrel Tablet 75 milliGRAM(s) Oral daily  dextrose 5%. 1000 milliLiter(s) IV Continuous <Continuous>  dextrose 5%. 1000 milliLiter(s) IV Continuous <Continuous>  influenza  Vaccine (HIGH DOSE) 0.7 milliLiter(s) IntraMuscular once  rivaroxaban 15 milliGRAM(s) Oral with dinner      REVIEW OF SYSTEMS:    CONSTITUTIONAL: No fever, weight loss, or fatigue  EYES: No eye pain, visual disturbances, or discharge  NECK: No pain or stiffness  RESPIRATORY: No cough, wheezing, chills or hemoptysis; No Shortness of Breath  CARDIOVASCULAR: No chest pain, palpitations, dizziness, or leg swelling  GASTROINTESTINAL: No abdominal or epigastric pain. No nausea, vomiting, or hematemesis; No diarrhea or constipation. No melena or hematochezia.  GENITOURINARY: No dysuria, frequency, hematuria, or incontinence  NEUROLOGICAL: No headaches, memory loss, loss of strength, numbness, or tremors  SKIN: No itching, burning, rashes, or lesions   LYMPH Nodes: No enlarged glands  MUSCULOSKELETAL: No joint pain or swelling; No muscle, back, or extremity pain  All other review of systems are negative.      PHYSICAL EXAM:  T(C): 36.7 (01-09-24 @ 09:04), Max: 37.1 (01-08-24 @ 21:01)  HR: 83 (01-09-24 @ 09:04) (65 - 110)  BP: 109/60 (01-09-24 @ 09:04) (109/60 - 209/100)  RR: 18 (01-09-24 @ 09:04) (16 - 18)  SpO2: 95% (01-09-24 @ 09:04) (93% - 98%)  Wt(kg): --  I&O's Summary    08 Jan 2024 07:01  -  09 Jan 2024 07:00  --------------------------------------------------------  IN: 440 mL / OUT: 220 mL / NET: 220 mL          PHYSICAL EXAM    Appearance: Normal	  HEENT:   Normal oral mucosa, PERRL, EOMI	  NECK: Soft and supple, No LAD, No JVD  Cardiovascular: Regular Rate and Rhythm, Normal S1 S2, No murmurs, No clicks, gallops or rubs  Respiratory: Lungs clear to auscultation	  Extremities: No clubbing, cyanosis or edema    LABS:	 	               15.3   12.22 )-----------( 177      ( 09 Jan 2024 06:29 )             45.0     01-09    139  |  106  |  35<H>  ----------------------------<  112<H>  4.3   |  20<L>  |  1.62<H>    Ca    9.3      09 Jan 2024 06:29    TPro  5.6<L>  /  Alb  3.4  /  TBili  1.2  /  DBili  x   /  AST  25  /  ALT  14  /  AlkPhos  108  01-08

## 2024-01-09 NOTE — PROGRESS NOTE ADULT - PROBLEM SELECTOR PLAN 3
- C/w BB, ACE-I, Statin, Plavix

## 2024-01-09 NOTE — PROGRESS NOTE ADULT - PROBLEM SELECTOR PROBLEM 4
BARRY (acute kidney injury)

## 2024-01-09 NOTE — DISCHARGE NOTE PROVIDER - NSDCCPCAREPLAN_GEN_ALL_CORE_FT
PRINCIPAL DISCHARGE DIAGNOSIS  Diagnosis: ACS (acute coronary syndrome)  Assessment and Plan of Treatment: NSTEMI  You underwent cardicac catheterization 1/8/24: drug elluding stents placed to pLAD 80% x1, mLAD 80% x1, and pLM 70% x1.   - Right Femoral Artery sheath removed on 1/8/24 - site stable  - May resume Xarelto today 1/9.   - ASA/Plavix/Xarelto for 1 week then drop ASA and continue Xarelto and Plavix  - Continue atorvastatin  - Recommend a heart healthy diet which includes a variety of fruits and vegetables, whole grains, low fat dairy products, legumes and skinless poultry and fish; food prepared with little or no salt and minimize processed foods  - Avoid using NSAIDs  (Aleve, Motrin, ibuprofen, naproxen) while on DAPT, please utilize Tylenol for pain control (not to exceed 4gm in 24 hours)  - Please follow-up with your primary care physician and cardiologist within one week of discharge.      SECONDARY DISCHARGE DIAGNOSES  Diagnosis: Acute kidney injury superimposed on CKD  Assessment and Plan of Treatment: Lasix and entresto held, creatinine returned to baseline. You underwent cardiac catheterization. Re-started entresto.  Please obtain BMP in 3-5 days and follow-up with nephrologist Dr. Markell Walton in 1-4 weeks.    Diagnosis: Chronic atrial fibrillation  Assessment and Plan of Treatment: Continue xarelto and metoprolol     PRINCIPAL DISCHARGE DIAGNOSIS  Diagnosis: ACS (acute coronary syndrome)  Assessment and Plan of Treatment: NSTEMI  You underwent cardicac catheterization 1/8/24: drug elluding stents placed to pLAD 80% x1, mLAD 80% x1, and pLM 70% x1.   - Right Femoral Artery sheath removed on 1/8/24 - site stable  - May resume Xarelto today 1/9/24.   - ASA/Plavix/Xarelto for 1 week then drop ASA and continue Xarelto and Plavix  - Continue atorvastatin  - Recommend a heart healthy diet which includes a variety of fruits and vegetables, whole grains, low fat dairy products, legumes and skinless poultry and fish; food prepared with little or no salt and minimize processed foods  - Avoid using NSAIDs  (Aleve, Motrin, ibuprofen, naproxen) while on DAPT, please utilize Tylenol for pain control (not to exceed 4gm in 24 hours)  - Please follow-up with your primary care physician, inteventional cardiology, and cardiologist within one week of discharge.      SECONDARY DISCHARGE DIAGNOSES  Diagnosis: Acute kidney injury superimposed on CKD  Assessment and Plan of Treatment: Lasix and entresto held, creatinine returned to baseline. You underwent cardiac catheterization. Re-started entresto.  Please obtain BMP in 3-5 days and follow-up with nephrologist Dr. Markell Walton in 1-4 weeks.    Diagnosis: Chronic atrial fibrillation  Assessment and Plan of Treatment: Continue xarelto and metoprolol     PRINCIPAL DISCHARGE DIAGNOSIS  Diagnosis: ACS (acute coronary syndrome)  Assessment and Plan of Treatment: NSTEMI  You underwent cardicac catheterization 1/8/24: PCI - drug elluding stents placed to pLAD 80% x1, mLAD 80% x1, and pLM 70% x1.   - Right Femoral Artery sheath removed on 1/8/24 - site stable  - May resume Xarelto today 1/9/24.   - Aspirin (ASA)/Plavix/Xarelto for 1 week then drop ASA and continue Xarelto and Plavix  - Continue atorvastatin  - Recommend a heart healthy diet which includes a variety of fruits and vegetables, whole grains, low fat dairy products, legumes and skinless poultry and fish; food prepared with little or no salt and minimize processed foods  - Avoid using NSAIDs  (Aleve, Motrin, ibuprofen, naproxen) while on DAPT, please utilize Tylenol for pain control (not to exceed 4gm in 24 hours)  - Please follow-up with your primary care physician, and cardiologist within one week of discharge.      SECONDARY DISCHARGE DIAGNOSES  Diagnosis: Acute kidney injury superimposed on CKD  Assessment and Plan of Treatment: Lasix and entresto held, creatinine returned to baseline. You may resume lasix. You underwent cardiac catheterization. Re-started entresto.  Please obtain BMP in 3-5 days and follow-up with nephrologist Dr. Markell Walton in 1-4 weeks.    Diagnosis: Chronic atrial fibrillation  Assessment and Plan of Treatment: Continue xarelto and metoprolol     PRINCIPAL DISCHARGE DIAGNOSIS  Diagnosis: ACS (acute coronary syndrome)  Assessment and Plan of Treatment: NSTEMI  You underwent cardicac catheterization 1/8/24: PCI - drug elluding stents placed to pLAD 80% x1, mLAD 80% x1, and pLM 70% x1.   - Right Femoral Artery sheath removed on 1/8/24 - site stable  - May resume Xarelto today 1/9/24.   - Aspirin (ASA)/Plavix/Xarelto for 1 week then drop ASA and continue Xarelto and Plavix. You have taken 2 days of aspirin, lease continue for an additional 5 days.  - Continue atorvastatin  - Recommend a heart healthy diet which includes a variety of fruits and vegetables, whole grains, low fat dairy products, legumes and skinless poultry and fish; food prepared with little or no salt and minimize processed foods  - Avoid using NSAIDs  (Aleve, Motrin, ibuprofen, naproxen) while on DAPT, please utilize Tylenol for pain control (not to exceed 4gm in 24 hours)  - Please follow-up with your primary care physician, and cardiologist within one week of discharge.      SECONDARY DISCHARGE DIAGNOSES  Diagnosis: Acute kidney injury superimposed on CKD  Assessment and Plan of Treatment: Lasix and entresto held, creatinine returned to baseline. You may resume lasix. You underwent cardiac catheterization. Re-started entresto.  Please obtain BMP in 3-5 days and follow-up with nephrologist Dr. Markell Walton in 1-4 weeks.    Diagnosis: Chronic atrial fibrillation  Assessment and Plan of Treatment: Continue xarelto and metoprolol

## 2024-01-09 NOTE — PROVIDER CONTACT NOTE (CHANGE IN STATUS NOTIFICATION) - ACTION/TREATMENT ORDERED:
No additional interventions or orders placed at this time. Continuous monitoring in progress. Call bell within reach and safety maintained.
STAT trops, 12 lead ECG ordered. Continuous monitoring in progress. Call bell within reach and safety maintained.
Provider ordered 1 dose of nitroglycerin sublingual 0.4mg.

## 2024-01-09 NOTE — PROGRESS NOTE ADULT - PROBLEM SELECTOR PLAN 4
renal consult called
renal consult called: appreciate input
- Cr  1.71, (baseline ~ 1.4 - 1.6), possible cardio-renal component   - Judicious IVF given CHF hx  - Trend labs, monitor renal function   - Avoid nephrotoxic meds
renal consult called: appreciate input

## 2024-01-09 NOTE — DISCHARGE NOTE PROVIDER - NSDCMRMEDTOKEN_GEN_ALL_CORE_FT
atorvastatin 80 mg oral tablet: 1 tab(s) orally once a day (at bedtime)  clopidogrel 75 mg oral tablet: 1 tab(s) orally once a day  Entresto 49 mg-51 mg oral tablet: 1 tab(s) orally 2 times a day  ezetimibe 10 mg oral tablet: 1 tab(s) orally once a day  Farxiga 10 mg oral tablet: 1 tab(s) orally once a day  furosemide 40 mg oral tablet: 1 tab(s) orally once a day  metoprolol tartrate 50 mg oral tablet: 1 tab(s) orally 2 times a day  multivitamin gummies: 1 gummy orally once a day  nitroglycerin 0.4 mg sublingual tablet: 1 tab(s) sublingually as needed  Semglee (Prefilled Pen) 100 units/mL subcutaneous solution: 28 unit(s) subcutaneous once a day (at bedtime)  Xarelto 20 mg oral tablet: 1 tab(s) orally once a day (see internal note)   acetaminophen 325 mg oral tablet: 2 tab(s) orally every 6 hours As needed Temp greater or equal to 38C (100.4F), Moderate Pain (4 - 6)  atorvastatin 80 mg oral tablet: 1 tab(s) orally once a day (at bedtime)  clopidogrel 75 mg oral tablet: 1 tab(s) orally once a day  Entresto 49 mg-51 mg oral tablet: 1 tab(s) orally 2 times a day  ezetimibe 10 mg oral tablet: 1 tab(s) orally once a day  Farxiga 10 mg oral tablet: 1 tab(s) orally once a day  furosemide 40 mg oral tablet: 1 tab(s) orally once a day  metoprolol tartrate 50 mg oral tablet: 1 tab(s) orally 2 times a day  multivitamin gummies: 1 gummy orally once a day  nitroglycerin 0.4 mg sublingual tablet: 1 tab(s) sublingually as needed  Semglee (Prefilled Pen) 100 units/mL subcutaneous solution: 28 unit(s) subcutaneous once a day (at bedtime)  Xarelto 20 mg oral tablet: 1 tab(s) orally once a day (see internal note)   acetaminophen 325 mg oral tablet: 2 tab(s) orally every 6 hours As needed Temp greater or equal to 38C (100.4F), Moderate Pain (4 - 6)  aspirin 81 mg oral delayed release tablet: 1 tab(s) orally once a day  atorvastatin 80 mg oral tablet: 1 tab(s) orally once a day (at bedtime)  clopidogrel 75 mg oral tablet: 1 tab(s) orally once a day  Entresto 49 mg-51 mg oral tablet: 1 tab(s) orally 2 times a day  ezetimibe 10 mg oral tablet: 1 tab(s) orally once a day  Farxiga 10 mg oral tablet: 1 tab(s) orally once a day  furosemide 40 mg oral tablet: 1 tab(s) orally once a day  metoprolol tartrate 50 mg oral tablet: 1 tab(s) orally 2 times a day  multivitamin gummies: 1 gummy orally once a day  nitroglycerin 0.4 mg sublingual tablet: 1 tab(s) sublingually as needed  Semglee (Prefilled Pen) 100 units/mL subcutaneous solution: 28 unit(s) subcutaneous once a day (at bedtime)  Xarelto 20 mg oral tablet: 1 tab(s) orally once a day (see internal note)   acetaminophen 325 mg oral tablet: 2 tab(s) orally every 6 hours As needed Temp greater or equal to 38C (100.4F), Moderate Pain (4 - 6)  aspirin 81 mg oral delayed release tablet: 1 tab(s) orally once a day  aspirin 81 mg oral delayed release tablet: 1 tab(s) orally once a day  atorvastatin 80 mg oral tablet: 1 tab(s) orally once a day (at bedtime)  clopidogrel 75 mg oral tablet: 1 tab(s) orally once a day  Entresto 49 mg-51 mg oral tablet: 1 tab(s) orally 2 times a day  ezetimibe 10 mg oral tablet: 1 tab(s) orally once a day  Farxiga 10 mg oral tablet: 1 tab(s) orally once a day  furosemide 40 mg oral tablet: 1 tab(s) orally once a day  metoprolol tartrate 50 mg oral tablet: 1 tab(s) orally 2 times a day  multivitamin gummies: 1 gummy orally once a day  nitroglycerin 0.4 mg sublingual tablet: 1 tab(s) sublingually as needed  rivaroxaban 15 mg oral tablet: 1 tab(s) orally once a day (before a meal)  Semglee (Prefilled Pen) 100 units/mL subcutaneous solution: 28 unit(s) subcutaneous once a day (at bedtime)

## 2024-01-09 NOTE — PROVIDER CONTACT NOTE (CHANGE IN STATUS NOTIFICATION) - BACKGROUND
Dx: chest pain  PMH: CHF, AF, stented coronary artery, T2DM. HLD, former smoker, HTN
Pt was admitted with chest pain, acute on chronic renal failure, afib, h/o chf. S/P cath, c/o chest pain post procedure, 2 doses of nitroglycerin received before arrival on floor.
Dx: chest pain  PMH: CHF, AF, stented coronary artery, T2DM. HLD, former smoker, HTN

## 2024-01-09 NOTE — PROGRESS NOTE ADULT - PROVIDER SPECIALTY LIST ADULT
Cardiology
Nephrology
Nephrology
Cardiology
Internal Medicine
Cardiology
Intervent Cardiology
Internal Medicine

## 2024-01-10 RX ORDER — ASPIRIN/CALCIUM CARB/MAGNESIUM 324 MG
1 TABLET ORAL
Qty: 5 | Refills: 0
Start: 2024-01-10 | End: 2024-01-14

## 2024-01-12 NOTE — CHART NOTE - NSCHARTNOTEFT_GEN_A_CORE
Patient was outreached but did not answer. A voicemail was left for the patient to return our call on all numbers on [facility], [facility], [place] including EC. Patient was outreached but did not answer. A voicemail was left for the patient to return our call on all numbers on [facility], [facility], [place] including EC.

## 2024-01-12 NOTE — CHART NOTE - NSCHARTNOTESELECT_GEN_ALL_CORE
Cath Eval
Event Note
Event Note
Chest Pain/Event Note
D/C appt/Event Note
Hypoglycemia/Event Note
RFA sheath pull/Event Note
d/c note/Event Note

## 2024-01-28 ENCOUNTER — INPATIENT (INPATIENT)
Facility: HOSPITAL | Age: 69
LOS: 1 days | Discharge: HOME | DRG: 281 | End: 2024-01-30
Attending: GENERAL ACUTE CARE HOSPITAL | Admitting: INTERNAL MEDICINE
Payer: COMMERCIAL

## 2024-01-28 VITALS
HEIGHT: 66 IN | OXYGEN SATURATION: 98 % | RESPIRATION RATE: 20 BRPM | TEMPERATURE: 98 F | WEIGHT: 139.99 LBS | DIASTOLIC BLOOD PRESSURE: 58 MMHG | HEART RATE: 76 BPM | SYSTOLIC BLOOD PRESSURE: 147 MMHG

## 2024-01-28 DIAGNOSIS — I21.4 NON-ST ELEVATION (NSTEMI) MYOCARDIAL INFARCTION: ICD-10-CM

## 2024-01-28 DIAGNOSIS — I50.9 HEART FAILURE, UNSPECIFIED: ICD-10-CM

## 2024-01-28 DIAGNOSIS — N18.30 CHRONIC KIDNEY DISEASE, STAGE 3 UNSPECIFIED: ICD-10-CM

## 2024-01-28 DIAGNOSIS — E11.9 TYPE 2 DIABETES MELLITUS WITHOUT COMPLICATIONS: ICD-10-CM

## 2024-01-28 DIAGNOSIS — I10 ESSENTIAL (PRIMARY) HYPERTENSION: ICD-10-CM

## 2024-01-28 LAB
ALBUMIN SERPL ELPH-MCNC: 4.3 G/DL — SIGNIFICANT CHANGE UP (ref 3.3–5)
ALP SERPL-CCNC: 98 U/L — SIGNIFICANT CHANGE UP (ref 40–120)
ALT FLD-CCNC: 45 U/L — SIGNIFICANT CHANGE UP (ref 10–45)
ANION GAP SERPL CALC-SCNC: 11 MMOL/L — SIGNIFICANT CHANGE UP (ref 5–17)
APTT BLD: 46 SEC — HIGH (ref 24.5–35.6)
AST SERPL-CCNC: 93 U/L — HIGH (ref 10–40)
BASOPHILS # BLD AUTO: 0.03 K/UL — SIGNIFICANT CHANGE UP (ref 0–0.2)
BASOPHILS NFR BLD AUTO: 0.3 % — SIGNIFICANT CHANGE UP (ref 0–2)
BILIRUB SERPL-MCNC: 1.7 MG/DL — HIGH (ref 0.2–1.2)
BUN SERPL-MCNC: 39 MG/DL — HIGH (ref 7–23)
CALCIUM SERPL-MCNC: 9.7 MG/DL — SIGNIFICANT CHANGE UP (ref 8.4–10.5)
CHLORIDE SERPL-SCNC: 102 MMOL/L — SIGNIFICANT CHANGE UP (ref 96–108)
CK MB CFR SERPL CALC: 19.1 NG/ML — HIGH (ref 0–6.7)
CO2 SERPL-SCNC: 25 MMOL/L — SIGNIFICANT CHANGE UP (ref 22–31)
CREAT SERPL-MCNC: 2.04 MG/DL — HIGH (ref 0.5–1.3)
EGFR: 35 ML/MIN/1.73M2 — LOW
EOSINOPHIL # BLD AUTO: 0.2 K/UL — SIGNIFICANT CHANGE UP (ref 0–0.5)
EOSINOPHIL NFR BLD AUTO: 2.3 % — SIGNIFICANT CHANGE UP (ref 0–6)
GLUCOSE SERPL-MCNC: 178 MG/DL — HIGH (ref 70–99)
HCT VFR BLD CALC: 39.8 % — SIGNIFICANT CHANGE UP (ref 39–50)
HGB BLD-MCNC: 13.3 G/DL — SIGNIFICANT CHANGE UP (ref 13–17)
IMM GRANULOCYTES NFR BLD AUTO: 0.2 % — SIGNIFICANT CHANGE UP (ref 0–0.9)
INR BLD: 2.36 RATIO — HIGH (ref 0.85–1.18)
LYMPHOCYTES # BLD AUTO: 1.08 K/UL — SIGNIFICANT CHANGE UP (ref 1–3.3)
LYMPHOCYTES # BLD AUTO: 12.3 % — LOW (ref 13–44)
MCHC RBC-ENTMCNC: 31.6 PG — SIGNIFICANT CHANGE UP (ref 27–34)
MCHC RBC-ENTMCNC: 33.4 GM/DL — SIGNIFICANT CHANGE UP (ref 32–36)
MCV RBC AUTO: 94.5 FL — SIGNIFICANT CHANGE UP (ref 80–100)
MONOCYTES # BLD AUTO: 0.65 K/UL — SIGNIFICANT CHANGE UP (ref 0–0.9)
MONOCYTES NFR BLD AUTO: 7.4 % — SIGNIFICANT CHANGE UP (ref 2–14)
NEUTROPHILS # BLD AUTO: 6.83 K/UL — SIGNIFICANT CHANGE UP (ref 1.8–7.4)
NEUTROPHILS NFR BLD AUTO: 77.5 % — HIGH (ref 43–77)
NRBC # BLD: 0 /100 WBCS — SIGNIFICANT CHANGE UP (ref 0–0)
PLATELET # BLD AUTO: 181 K/UL — SIGNIFICANT CHANGE UP (ref 150–400)
POTASSIUM SERPL-MCNC: 4.9 MMOL/L — SIGNIFICANT CHANGE UP (ref 3.5–5.3)
POTASSIUM SERPL-SCNC: 4.9 MMOL/L — SIGNIFICANT CHANGE UP (ref 3.5–5.3)
PROT SERPL-MCNC: 6.5 G/DL — SIGNIFICANT CHANGE UP (ref 6–8.3)
PROTHROM AB SERPL-ACNC: 24.2 SEC — HIGH (ref 9.5–13)
RBC # BLD: 4.21 M/UL — SIGNIFICANT CHANGE UP (ref 4.2–5.8)
RBC # FLD: 13.3 % — SIGNIFICANT CHANGE UP (ref 10.3–14.5)
SODIUM SERPL-SCNC: 138 MMOL/L — SIGNIFICANT CHANGE UP (ref 135–145)
TROPONIN T, HIGH SENSITIVITY RESULT: 319 NG/L — HIGH (ref 0–51)
WBC # BLD: 8.81 K/UL — SIGNIFICANT CHANGE UP (ref 3.8–10.5)
WBC # FLD AUTO: 8.81 K/UL — SIGNIFICANT CHANGE UP (ref 3.8–10.5)

## 2024-01-28 PROCEDURE — 99291 CRITICAL CARE FIRST HOUR: CPT

## 2024-01-28 PROCEDURE — 71045 X-RAY EXAM CHEST 1 VIEW: CPT | Mod: 26

## 2024-01-28 PROCEDURE — 99223 1ST HOSP IP/OBS HIGH 75: CPT

## 2024-01-28 RX ORDER — DEXTROSE 50 % IN WATER 50 %
25 SYRINGE (ML) INTRAVENOUS ONCE
Refills: 0 | Status: DISCONTINUED | OUTPATIENT
Start: 2024-01-28 | End: 2024-01-30

## 2024-01-28 RX ORDER — ASPIRIN/CALCIUM CARB/MAGNESIUM 324 MG
325 TABLET ORAL ONCE
Refills: 0 | Status: COMPLETED | OUTPATIENT
Start: 2024-01-28 | End: 2024-01-28

## 2024-01-28 RX ORDER — CLOPIDOGREL BISULFATE 75 MG/1
75 TABLET, FILM COATED ORAL DAILY
Refills: 0 | Status: DISCONTINUED | OUTPATIENT
Start: 2024-01-28 | End: 2024-01-29

## 2024-01-28 RX ORDER — DEXTROSE 50 % IN WATER 50 %
15 SYRINGE (ML) INTRAVENOUS ONCE
Refills: 0 | Status: DISCONTINUED | OUTPATIENT
Start: 2024-01-28 | End: 2024-01-30

## 2024-01-28 RX ORDER — METOPROLOL TARTRATE 50 MG
50 TABLET ORAL
Refills: 0 | Status: DISCONTINUED | OUTPATIENT
Start: 2024-01-28 | End: 2024-01-30

## 2024-01-28 RX ORDER — ATORVASTATIN CALCIUM 80 MG/1
80 TABLET, FILM COATED ORAL ONCE
Refills: 0 | Status: COMPLETED | OUTPATIENT
Start: 2024-01-28 | End: 2024-01-28

## 2024-01-28 RX ORDER — SODIUM CHLORIDE 9 MG/ML
1000 INJECTION, SOLUTION INTRAVENOUS
Refills: 0 | Status: DISCONTINUED | OUTPATIENT
Start: 2024-01-28 | End: 2024-01-30

## 2024-01-28 RX ORDER — INSULIN GLARGINE 100 [IU]/ML
14 INJECTION, SOLUTION SUBCUTANEOUS ONCE
Refills: 0 | Status: COMPLETED | OUTPATIENT
Start: 2024-01-28 | End: 2024-01-28

## 2024-01-28 RX ORDER — ACETAMINOPHEN 500 MG
650 TABLET ORAL EVERY 6 HOURS
Refills: 0 | Status: DISCONTINUED | OUTPATIENT
Start: 2024-01-28 | End: 2024-01-30

## 2024-01-28 RX ORDER — INSULIN LISPRO 100/ML
VIAL (ML) SUBCUTANEOUS
Refills: 0 | Status: DISCONTINUED | OUTPATIENT
Start: 2024-01-28 | End: 2024-01-30

## 2024-01-28 RX ORDER — FUROSEMIDE 40 MG
40 TABLET ORAL DAILY
Refills: 0 | Status: DISCONTINUED | OUTPATIENT
Start: 2024-01-28 | End: 2024-01-30

## 2024-01-28 RX ORDER — TICAGRELOR 90 MG/1
180 TABLET ORAL ONCE
Refills: 0 | Status: COMPLETED | OUTPATIENT
Start: 2024-01-28 | End: 2024-01-28

## 2024-01-28 RX ORDER — LANOLIN ALCOHOL/MO/W.PET/CERES
3 CREAM (GRAM) TOPICAL AT BEDTIME
Refills: 0 | Status: DISCONTINUED | OUTPATIENT
Start: 2024-01-28 | End: 2024-01-30

## 2024-01-28 RX ORDER — HEPARIN SODIUM 5000 [USP'U]/ML
INJECTION INTRAVENOUS; SUBCUTANEOUS
Qty: 25000 | Refills: 0 | Status: DISCONTINUED | OUTPATIENT
Start: 2024-01-28 | End: 2024-01-29

## 2024-01-28 RX ORDER — ATORVASTATIN CALCIUM 80 MG/1
80 TABLET, FILM COATED ORAL AT BEDTIME
Refills: 0 | Status: DISCONTINUED | OUTPATIENT
Start: 2024-01-29 | End: 2024-01-30

## 2024-01-28 RX ORDER — DEXTROSE 50 % IN WATER 50 %
12.5 SYRINGE (ML) INTRAVENOUS ONCE
Refills: 0 | Status: DISCONTINUED | OUTPATIENT
Start: 2024-01-28 | End: 2024-01-30

## 2024-01-28 RX ORDER — INSULIN GLARGINE 100 [IU]/ML
14 INJECTION, SOLUTION SUBCUTANEOUS AT BEDTIME
Refills: 0 | Status: DISCONTINUED | OUTPATIENT
Start: 2024-01-29 | End: 2024-01-30

## 2024-01-28 RX ORDER — SACUBITRIL AND VALSARTAN 24; 26 MG/1; MG/1
1 TABLET, FILM COATED ORAL
Refills: 0 | Status: DISCONTINUED | OUTPATIENT
Start: 2024-01-28 | End: 2024-01-30

## 2024-01-28 RX ORDER — GLUCAGON INJECTION, SOLUTION 0.5 MG/.1ML
1 INJECTION, SOLUTION SUBCUTANEOUS ONCE
Refills: 0 | Status: DISCONTINUED | OUTPATIENT
Start: 2024-01-28 | End: 2024-01-30

## 2024-01-28 RX ADMIN — Medication 325 MILLIGRAM(S): at 21:12

## 2024-01-28 RX ADMIN — HEPARIN SODIUM 750 UNIT(S)/HR: 5000 INJECTION INTRAVENOUS; SUBCUTANEOUS at 23:29

## 2024-01-28 RX ADMIN — TICAGRELOR 180 MILLIGRAM(S): 90 TABLET ORAL at 21:12

## 2024-01-28 RX ADMIN — HEPARIN SODIUM 750 UNIT(S)/HR: 5000 INJECTION INTRAVENOUS; SUBCUTANEOUS at 21:11

## 2024-01-28 NOTE — ED PROVIDER NOTE - CLINICAL SUMMARY MEDICAL DECISION MAKING FREE TEXT BOX
69 yo m hx CHFrEf recent PCI TITO x3 here for typical chest pain at both exertion and rest relieved w nitroglycerin. no chest pain during exam. patient well appearing other vitals wnl, ekg unchanged from 1/8 elevation in v2,3 1.5 mm no reciprocal depression.     no ekg changes to suggest reocclusion, and patient on dual therapy, not triple therapy. treat as NSTEMI w elevated trop, echo, adm for cath

## 2024-01-28 NOTE — ED ADULT NURSE REASSESSMENT NOTE - NS ED NURSE REASSESS COMMENT FT1
Received report from RUSSELL Marino. Patient a/ox4, resting in stretcher. Patient endorsing sudden onset of 10/10 non-radiating midsternal chest pain. MD Keys called via teams. As per MD Keys, RN to administer 1 dose of sublingual nitro and complete new EKG. MD Keys to come to bedside to assess patient. Patient on CM, A-fib. Safety and comfort provided.

## 2024-01-28 NOTE — ED ADULT TRIAGE NOTE - PAIN RATING/NUMBER SCALE (0-10): ACTIVITY
0 (no pain/absence of nonverbal indicators of pain)
Coretta Fernandez)  Obstetrics and Gynecology  [address], [address]  [place] [address]  Phone: [phone]  Fax: [phone]  Follow Up Time:

## 2024-01-28 NOTE — H&P ADULT - NSTOBACCOSCREENHP_GEN_A_NCS
Medical Week 4 Survey      Responses   [facility] patient discharged from?  [place]   Does the patient have one of the following disease processes/diagnoses(primary or secondary)?  Other   Week 4 attempt successful?  Yes   Call start time  1651   Call end time  1654   Person spoke with today (if not patient) and relationship  spouse- Naz/ Patient    Meds reviewed with patient/caregiver?  Yes   Is the patient having any side effects they believe may be caused by any medication additions or changes?  No   Is the patient taking all medications as directed (includes completed medication regime)?  Yes   Medication comments  Cardiology DC the Sotalol r/t nausea/lethargic   Has the patient kept scheduled appointments due by today?  Yes   Comments  wearing telemetry for 14 days   Psychosocial issues?  No   What is the patient's perception of their health status since discharge?  Returned to baseline/stable   Is the patient/caregiver able to teach back signs and symptoms related to disease process for when to call 911?  Yes   Is the patient/caregiver able to teach back the hierarchy of who to call/visit for symptoms/problems? PCP, Specialist, Home health nurse, Urgent Care, ED, 911  Yes   Week 4 Call Completed?  Yes   Would the patient like one additional call?  No   Graduated  Yes   Is the patient interested in additional calls from an ambulatory ?  NOTE:  applies to high risk patients requiring additional follow-up.  No   Did the patient feel the follow up calls were helpful during their recovery period?  Yes   Was the number of calls appropriate?  Yes          Alyssa Snider RN   No

## 2024-01-28 NOTE — ED PROVIDER NOTE - PHYSICAL EXAMINATION
GENERAL: well appearing in no acute distress, non-toxic appearing  HEAD: normocephalic, atraumatic  CARDIAC: irregular rate and rhythm, normal S1S2, no appreciable murmurs, 2+ pulses in UE/LE b/l  PULM: normal breath sounds, clear to ascultation bilaterally, no rales, rhonchi, wheezing  GI: abdomen nondistended, soft, nontender, no guarding, rebound tenderness  NEURO: no focal motor or sensory deficits, normal speech,  AAOx3  MSK: no peripheral edema, no calf tenderness b/l

## 2024-01-28 NOTE — ED CLERICAL - NS ED CLERK NOTE PRE-ARRIVAL INFORMATION; ADDITIONAL PRE-ARRIVAL INFORMATION
CC/Reason For referral: r/o Mi, chest pain, stent placement by Dr. Dominguez 1/8 cpk 757  Preferred Consultant(if applicable): Dr. Moon  Who admits for you (if needed): Dr. Benjamin  Do you have documents you would like to fax over?  Would you still like to speak to an ED attending?

## 2024-01-28 NOTE — H&P ADULT - NSHPPHYSICALEXAM_GEN_ALL_CORE
T(C): 36.7 (01-28-24 @ 19:15), Max: 36.7 (01-28-24 @ 19:15)  HR: 74 (01-28-24 @ 19:15) (74 - 76)  BP: 147/85 (01-28-24 @ 19:15) (147/58 - 147/85)  RR: 18 (01-28-24 @ 19:15) (18 - 20)  SpO2: 99% (01-28-24 @ 19:15) (98% - 99%)    CONSTITUTIONAL: In no acute distress.  EYES: PERRLA and symmetric, EOMI, No conjunctival or scleral injection, non-icteric  ENMT: Oral mucosa with moist membranes.  NECK: Supple, symmetric. No JVD.  RESP: No respiratory distress, no use of accessory muscles; CTA b/l, no WRR  CV: Irregularly Irregular, +S1S2, no MRG  GI: Soft, NT, ND, no rebound, no guarding  : No suprapubic tenderness. No CVA tenderness.  LYMPH: No cervical LAD or tenderness  MSK: No spinal tenderness, normal muscle strength/tone  EXTREMITIES: No pedal edema  SKIN: No rashes or ulcers noted  NEURO: A+Ox3, responsive. No tremor, sensation intact in upper and lower extremities b/l  PSYCH: Appropriate insight/judgment; mood and affect appropriate, recent/remote memory intact

## 2024-01-28 NOTE — H&P ADULT - ASSESSMENT
68M w/ Hx of CAD s/p 9 stents (most recent to pLM (70%), pLAD, mLAD on 1/8/24 with Dr. Dominguez), HFrEF (EF~50% as of 11/26/23), A-fib, HTN, HLD, T2DM, CKD3, former smoker, presents due to chest pain after recent stenting on 1/8/24.

## 2024-01-28 NOTE — ED PROVIDER NOTE - PROGRESS NOTE DETAILS
trop 319 in setting CKD, baseline trop 200 with cr 2. patient takes plavix/eliquis. will load w asa brilinta and hep drip. pending call back cardiologist sg tried to call dr solis private on call cardiologist no answer. will try dr. christine bravo SG spoke to fellow dr. craft who recommended asa load, brilinta, hep ACS no bolus for NSTEMI/angina.

## 2024-01-28 NOTE — H&P ADULT - HISTORY OF PRESENT ILLNESS
68M w/ Hx of CAD s/p 9 stents (most recent to pLM (70%), pLAD, mLAD on 1/8/24 with Dr. Dominguez), HFrEF (EF~50% as of 11/26/23), A-fib, HTN, HLD, T2DM, CKD3, former smoker, presents due to chest pain after recent stenting on 1/8/24. Patient was told to continue triple therapy with aspirin, plavix and full dose Xarelto for one week and then continue with just plavix and Xarelto afterwards and was compliant. Reports that for the last several days, he has been experiencing non-radiating, non-tender significant chest pain intermittently that worsens with activity or emotional excitation. Reports it feels similar to previous cardiac related chest pains he's had in the past. Spoke to his cardiologist on friday and got labs performed. Reports no other acute complaints and is well on exam currently. Reports he is unsure of last A1c and uses and variable amount of semglee every night, it is prescribed 28u per night but he will use as long as 12u a night if his glucose is on the lower end. Patient denies fever, chills,headache, dizziness, abd pain, nausea, vomiting, constipation, dysuria.

## 2024-01-28 NOTE — ED PROVIDER NOTE - OBJECTIVE STATEMENT
68-year-old male PMH of CAD with stents, HFrEF (EF~50%, 11/26/23), A-fib on Xarelto, HTN, HLD, T2DM, CKD3, former smoker, presenting with typical anginal symptom x3 weeks. said it started with heavy exertion, lifting bags, stayed midsternal and was relieved with rest and nitroglycerin. this past week told wife he had it at rest as well. was told to stop taking aspirin one week after PCI 1/8 with TITO to pLADx1 mLADx1 and pLM x1. blake called dr. hannah cardiologist and was advised to come to ed two weeks ago, but did not. has not misesd any doses of plavix/xarelto. denies weakness, fever, chills, shortness of breath, abdominal pain, nausea/vomiting, leg swelling.

## 2024-01-28 NOTE — H&P ADULT - PROBLEM SELECTOR PLAN 4
- Hold home Farxiga  - Pt reports taking anywhere from 12 to 28 units of semglee qD at home  - Give Lantus 14u qD (half of prescribed dose, redose based on daioly needs prior to discharge)  - Mod ISS  - F/u POCT, A1c

## 2024-01-28 NOTE — H&P ADULT - PROBLEM SELECTOR PLAN 1
- Cardiology consulted, follow recs  - Cw Tele monitoring  - Likely for LHC in AM  - Cw heparin drip  - Pt received aspirin and brillinta  - F/u STAT troponin, lactate, CK, CKMB  - F/u TTE - Cardiology consulted, follow recs  - Cw Tele monitoring  - Likely for LHC in AM  - Cw heparin drip  - Pt received aspirin and brillinta  - F/u STAT troponin, lactate, CK, CKMB  - F/u TTE  - Cw nitroglycerin PRN chest pain  - Cw plavix and atorvastatin

## 2024-01-28 NOTE — ED PROVIDER NOTE - OTHER FINDINGS
ECG recorded at 1633 independently interpreted by me, Dr Hugh Santacruz, shows A-fib rate 70 normal QRS axis scooping of ST segments V6 lead I lead aVL no ST depression.  Q waves in lead V1 lead V2 lead V3.  1 mm ST elevation in lead V2 and lead V3, largely unchanged from prior ECGs

## 2024-01-28 NOTE — ED ADULT NURSE NOTE - NSFALLHARMRISKINTERV_ED_ALL_ED

## 2024-01-28 NOTE — ED ADULT NURSE NOTE - OBJECTIVE STATEMENT
67 y/o male presents to the ED sent by nephrologist due to elevated CPK. A/Ox4. Ambulatory without assistive devices at baseline. PMH: A-fib (On Xarelto), HTN, T2DM, CKD3 and CAD s/p 6 stents. Patient endorses on 1/25 the patient experience 1 episode of chest pain relieved after 1 dose of PO nitro. Patient endorses visit to nephrologist on 1/26/24 where he had BW drawn. Patient endorses receiving call for abnormal BW on 1/28/24 and instructed to come to ED for elevated CPK. Patient endorses on 1/27/24 another episode of chest pain relieved after 1 dose of PO nitro. Patient denies dyspnea, nausea, vomiting, diarrhea, fever, cough and chills. Patient on CM, A-fib. Safety and comfort provided. 69 y/o male presents to the ED sent by nephrologist due to elevated CPK. A/Ox4. Ambulatory without assistive devices at baseline. PMH: A-fib (On Xarelto), HTN, T2DM, CKD3 and CAD s/p 6 stents (Recent cardiac cath on 1/8/24). Patient endorses on 1/25 the patient experience 1 episode of chest pain relieved after 1 dose of PO nitro. Patient endorses visit to nephrologist on 1/26/24 where he had BW drawn. Patient endorses receiving call for abnormal BW on 1/28/24 and instructed to come to ED for elevated CPK. Patient endorses on 1/27/24 another episode of chest pain relieved after 1 dose of PO nitro. Patient denies dyspnea, nausea, vomiting, diarrhea, fever, cough and chills. Patient on CM, A-fib. Safety and comfort provided. 69 y/o male presents to the ED sent by nephrologist due to elevated CPK. A/Ox4. Ambulatory without assistive devices at baseline. PMH: A-fib (On Xarelto), HTN, T2DM, CKD3 and CAD s/p 6 stents (Recent cardiac cath on 1/8/24). Patient endorses on 1/25 the patient experience 1 episode of chest pain relieved after 1 dose of PO nitro. Patient endorses visit to nephrologist on 1/26/24 where he had BW drawn. Patient endorses receiving call for abnormal BW on 1/28/24 and instructed to come to ED for elevated CPK. Patient endorses on 1/27/24 another episode of chest pain relieved after 1 dose of PO nitro. Patient denies chest pain at this time. Patient denies dyspnea, nausea, vomiting, diarrhea, fever, cough and chills. Patient on CM, A-fib. Safety and comfort provided.

## 2024-01-28 NOTE — CHART NOTE - NSCHARTNOTEFT_GEN_A_CORE
Cardiology Fellow Chart Note:    Patient is followed by private cardiology group, so unable to formally see patient as house cardiology, however ED unable to reach private cardiologist on call at this time, so will leave a chart note with preliminary recommendations.    Patient is a 68-year-old male PMH of CAD with 9 stents (most recent DESx3 to pLM, pLAD, mLAD on 1/8/24 with Dr. Dominguez, currently on Plavix/Xarelto dual therapy), HFrEF (EF~50%, 11/26/23), A-fib on Xarelto, HTN, HLD, T2DM, CKD3, former smoker, presenting with typical anginal symptoms.    Pain has been central chest, exertional, relieved with rest and nitro, and ongoing since Thursday, now with some pain at rest as well. No CP right now.    EKG shows afib with anterior/anteroseptal q-waves which were seen previously. Troponin 319 (was 130 at last admission), CK-MB 19.1, creatinine 2.0 (baseline high 1s).    Recommendations:  - Would treat as NSTEMI given ischemic sounding chest pain along with cardiac enzyme elevation  - Load with , Brilinta 180mg (would switch from Plavix given ischemia while on Plavix)  - Heparin drip  - Trend troponin, CK-MB, CPK, lactate, EKGs  - TTE in am  - LHC in am  - Please contact private cardiologist for further follow-up/recommendations and coordination of [facility]    Shant Menendez, PGY-4  Cardiology Fellow    For all new consults  [email or web address]  Login: [email or web address]

## 2024-01-28 NOTE — H&P ADULT - PROBLEM SELECTOR PLAN 3
- Hold home Farxiga  - Pt reports taking anywhere from 12 to 28 units of semglee qD at home  - Give Lantus 14u qD (half of prescribed dose, redose based on daioly needs prior to discharge)  - Mod ISS  - F/u POCT, A1c Hx of HFrEF  - Cw lasix, entresto, metoprolol  - Hold farxiga while inpatient

## 2024-01-28 NOTE — ED PROVIDER NOTE - ATTENDING CONTRIBUTION TO CARE
Attending MD Santacruz:  I have seen and examined this patient and fully participated in the care of this patient as the teaching attending. I personally made/approved the management plan and take responsibility for the patient management.      Left heart cath 1/8/2024: (Successful drug-eluting stent x 3 to pLM, proximal LAD and mi LAD lesions)      *The above represents an initial assessment/impression. Please refer to progress notes for potential changes in patient clinical course* Attending MD Santacruz:  I have seen and examined this patient and fully participated in the care of this patient as the teaching attending. I personally made/approved the management plan and take responsibility for the patient management.        68-year-old gentleman with a history of CAD with stents, CHF, A-fib on Xarelto hypertension hyperlipidemia diabetes CKD presenting for evaluation of 2 weeks of intermittent central chest discomfort.  Patient states the pain is described as "pain" he cannot further delineate what the pain feels like, he has no associated nausea vomiting diaphoresis shortness of breath with the pain.  His last incident of pain was yesterday.  He states he took sublingual nitroglycerin yesterday and the pain resolved completely.  He has not had any pain today.  He spoke with his cardiologist 2 weeks ago who urged him to come to the emergency department but he did not do it at that time.  He has been compliant with his Xarelto and Plavix, he was initially on aspirin but aspirin was discontinued after 1 week as per cardiology instructions.    Patient's vital signs are within normal limits, sitting in the stretcher no apparent distress.  Clear lungs anteriorly regular heart sounds no significant peripheral edema peripheral pulses full and equal in the bilateral upper and lower extremities.    ECG reviewed and rate controlled A-fib in the 70s without diagnostic ischemic findings.    Patient presenting for acute chest pain, significant coronary history, no active ischemic findings on ECG on arrival however patient very high risk, will obtain cardiac biomarkers and will require cardiology consultation for further guidance on ischemic evaluation.    Left heart cath 1/8/2024: (Successful drug-eluting stent x 3 to pLM, proximal LAD and mi LAD lesions)      *The above represents an initial assessment/impression. Please refer to progress notes for potential changes in patient clinical course*

## 2024-01-28 NOTE — H&P ADULT - PROBLEM SELECTOR PLAN 2
- A-Fib on EKG  - Heparin drip as above  - Hold xarelto while on heparin (likely cause of elevated INR)  - Cw metoprolol

## 2024-01-29 ENCOUNTER — TRANSCRIPTION ENCOUNTER (OUTPATIENT)
Age: 69
End: 2024-01-29

## 2024-01-29 DIAGNOSIS — I48.20 CHRONIC ATRIAL FIBRILLATION, UNSPECIFIED: ICD-10-CM

## 2024-01-29 LAB
A1C WITH ESTIMATED AVERAGE GLUCOSE RESULT: 6.7 % — HIGH (ref 4–5.6)
ALBUMIN SERPL ELPH-MCNC: 3.8 G/DL — SIGNIFICANT CHANGE UP (ref 3.3–5)
ALBUMIN SERPL ELPH-MCNC: 4 G/DL — SIGNIFICANT CHANGE UP (ref 3.3–5)
ALP SERPL-CCNC: 82 U/L — SIGNIFICANT CHANGE UP (ref 40–120)
ALP SERPL-CCNC: 94 U/L — SIGNIFICANT CHANGE UP (ref 40–120)
ALT FLD-CCNC: 39 U/L — SIGNIFICANT CHANGE UP (ref 10–45)
ALT FLD-CCNC: 41 U/L — SIGNIFICANT CHANGE UP (ref 10–45)
ANION GAP SERPL CALC-SCNC: 12 MMOL/L — SIGNIFICANT CHANGE UP (ref 5–17)
ANION GAP SERPL CALC-SCNC: 9 MMOL/L — SIGNIFICANT CHANGE UP (ref 5–17)
APTT BLD: 67.3 SEC — HIGH (ref 24.5–35.6)
APTT BLD: 98.7 SEC — HIGH (ref 24.5–35.6)
AST SERPL-CCNC: 76 U/L — HIGH (ref 10–40)
AST SERPL-CCNC: 91 U/L — HIGH (ref 10–40)
BILIRUB SERPL-MCNC: 1.9 MG/DL — HIGH (ref 0.2–1.2)
BILIRUB SERPL-MCNC: 2.1 MG/DL — HIGH (ref 0.2–1.2)
BUN SERPL-MCNC: 37 MG/DL — HIGH (ref 7–23)
BUN SERPL-MCNC: 39 MG/DL — HIGH (ref 7–23)
CALCIUM SERPL-MCNC: 9.3 MG/DL — SIGNIFICANT CHANGE UP (ref 8.4–10.5)
CALCIUM SERPL-MCNC: 9.8 MG/DL — SIGNIFICANT CHANGE UP (ref 8.4–10.5)
CHLORIDE SERPL-SCNC: 104 MMOL/L — SIGNIFICANT CHANGE UP (ref 96–108)
CHLORIDE SERPL-SCNC: 107 MMOL/L — SIGNIFICANT CHANGE UP (ref 96–108)
CHOLEST SERPL-MCNC: 90 MG/DL — SIGNIFICANT CHANGE UP
CK MB BLD-MCNC: 1.5 % — SIGNIFICANT CHANGE UP (ref 0–3.5)
CK MB CFR SERPL CALC: 15 NG/ML — HIGH (ref 0–6.7)
CK SERPL-CCNC: 971 U/L — HIGH (ref 30–200)
CO2 SERPL-SCNC: 22 MMOL/L — SIGNIFICANT CHANGE UP (ref 22–31)
CO2 SERPL-SCNC: 24 MMOL/L — SIGNIFICANT CHANGE UP (ref 22–31)
CREAT SERPL-MCNC: 1.88 MG/DL — HIGH (ref 0.5–1.3)
CREAT SERPL-MCNC: 2.08 MG/DL — HIGH (ref 0.5–1.3)
EGFR: 34 ML/MIN/1.73M2 — LOW
EGFR: 39 ML/MIN/1.73M2 — LOW
ESTIMATED AVERAGE GLUCOSE: 146 MG/DL — HIGH (ref 68–114)
GLUCOSE BLDC GLUCOMTR-MCNC: 102 MG/DL — HIGH (ref 70–99)
GLUCOSE BLDC GLUCOMTR-MCNC: 107 MG/DL — HIGH (ref 70–99)
GLUCOSE BLDC GLUCOMTR-MCNC: 138 MG/DL — HIGH (ref 70–99)
GLUCOSE BLDC GLUCOMTR-MCNC: 166 MG/DL — HIGH (ref 70–99)
GLUCOSE BLDC GLUCOMTR-MCNC: 79 MG/DL — SIGNIFICANT CHANGE UP (ref 70–99)
GLUCOSE SERPL-MCNC: 143 MG/DL — HIGH (ref 70–99)
GLUCOSE SERPL-MCNC: 93 MG/DL — SIGNIFICANT CHANGE UP (ref 70–99)
HCT VFR BLD CALC: 35.9 % — LOW (ref 39–50)
HCT VFR BLD CALC: 36.4 % — LOW (ref 39–50)
HCT VFR BLD CALC: 39.4 % — SIGNIFICANT CHANGE UP (ref 39–50)
HDLC SERPL-MCNC: 40 MG/DL — LOW
HGB BLD-MCNC: 12.2 G/DL — LOW (ref 13–17)
HGB BLD-MCNC: 12.5 G/DL — LOW (ref 13–17)
HGB BLD-MCNC: 13.1 G/DL — SIGNIFICANT CHANGE UP (ref 13–17)
INR BLD: 2.2 RATIO — HIGH (ref 0.85–1.18)
LACTATE SERPL-SCNC: 1 MMOL/L — SIGNIFICANT CHANGE UP (ref 0.5–2)
LIPID PNL WITH DIRECT LDL SERPL: 36 MG/DL — SIGNIFICANT CHANGE UP
MCHC RBC-ENTMCNC: 31.3 PG — SIGNIFICANT CHANGE UP (ref 27–34)
MCHC RBC-ENTMCNC: 31.7 PG — SIGNIFICANT CHANGE UP (ref 27–34)
MCHC RBC-ENTMCNC: 32 PG — SIGNIFICANT CHANGE UP (ref 27–34)
MCHC RBC-ENTMCNC: 33.2 GM/DL — SIGNIFICANT CHANGE UP (ref 32–36)
MCHC RBC-ENTMCNC: 34 GM/DL — SIGNIFICANT CHANGE UP (ref 32–36)
MCHC RBC-ENTMCNC: 34.3 GM/DL — SIGNIFICANT CHANGE UP (ref 32–36)
MCV RBC AUTO: 93.1 FL — SIGNIFICANT CHANGE UP (ref 80–100)
MCV RBC AUTO: 93.2 FL — SIGNIFICANT CHANGE UP (ref 80–100)
MCV RBC AUTO: 94.3 FL — SIGNIFICANT CHANGE UP (ref 80–100)
NON HDL CHOLESTEROL: 50 MG/DL — SIGNIFICANT CHANGE UP
NRBC # BLD: 0 /100 WBCS — SIGNIFICANT CHANGE UP (ref 0–0)
PLATELET # BLD AUTO: 166 K/UL — SIGNIFICANT CHANGE UP (ref 150–400)
PLATELET # BLD AUTO: 179 K/UL — SIGNIFICANT CHANGE UP (ref 150–400)
PLATELET # BLD AUTO: 195 K/UL — SIGNIFICANT CHANGE UP (ref 150–400)
POTASSIUM SERPL-MCNC: 4.3 MMOL/L — SIGNIFICANT CHANGE UP (ref 3.5–5.3)
POTASSIUM SERPL-MCNC: 4.7 MMOL/L — SIGNIFICANT CHANGE UP (ref 3.5–5.3)
POTASSIUM SERPL-SCNC: 4.3 MMOL/L — SIGNIFICANT CHANGE UP (ref 3.5–5.3)
POTASSIUM SERPL-SCNC: 4.7 MMOL/L — SIGNIFICANT CHANGE UP (ref 3.5–5.3)
PROT SERPL-MCNC: 5.7 G/DL — LOW (ref 6–8.3)
PROT SERPL-MCNC: 6.1 G/DL — SIGNIFICANT CHANGE UP (ref 6–8.3)
PROTHROM AB SERPL-ACNC: 22.6 SEC — HIGH (ref 9.5–13)
RBC # BLD: 3.85 M/UL — LOW (ref 4.2–5.8)
RBC # BLD: 3.91 M/UL — LOW (ref 4.2–5.8)
RBC # BLD: 4.18 M/UL — LOW (ref 4.2–5.8)
RBC # FLD: 13.2 % — SIGNIFICANT CHANGE UP (ref 10.3–14.5)
RBC # FLD: 13.2 % — SIGNIFICANT CHANGE UP (ref 10.3–14.5)
RBC # FLD: 13.3 % — SIGNIFICANT CHANGE UP (ref 10.3–14.5)
SODIUM SERPL-SCNC: 138 MMOL/L — SIGNIFICANT CHANGE UP (ref 135–145)
SODIUM SERPL-SCNC: 140 MMOL/L — SIGNIFICANT CHANGE UP (ref 135–145)
TRIGL SERPL-MCNC: 61 MG/DL — SIGNIFICANT CHANGE UP
TROPONIN T, HIGH SENSITIVITY RESULT: 260 NG/L — HIGH (ref 0–51)
WBC # BLD: 10.48 K/UL — SIGNIFICANT CHANGE UP (ref 3.8–10.5)
WBC # BLD: 9.47 K/UL — SIGNIFICANT CHANGE UP (ref 3.8–10.5)
WBC # BLD: 9.82 K/UL — SIGNIFICANT CHANGE UP (ref 3.8–10.5)
WBC # FLD AUTO: 10.48 K/UL — SIGNIFICANT CHANGE UP (ref 3.8–10.5)
WBC # FLD AUTO: 9.47 K/UL — SIGNIFICANT CHANGE UP (ref 3.8–10.5)
WBC # FLD AUTO: 9.82 K/UL — SIGNIFICANT CHANGE UP (ref 3.8–10.5)

## 2024-01-29 PROCEDURE — 93454 CORONARY ARTERY ANGIO S&I: CPT | Mod: 26

## 2024-01-29 PROCEDURE — 93306 TTE W/DOPPLER COMPLETE: CPT | Mod: 26

## 2024-01-29 PROCEDURE — 99233 SBSQ HOSP IP/OBS HIGH 50: CPT

## 2024-01-29 PROCEDURE — 99152 MOD SED SAME PHYS/QHP 5/>YRS: CPT

## 2024-01-29 RX ORDER — TICAGRELOR 90 MG/1
90 TABLET ORAL EVERY 12 HOURS
Refills: 0 | Status: DISCONTINUED | OUTPATIENT
Start: 2024-01-29 | End: 2024-01-29

## 2024-01-29 RX ORDER — NITROGLYCERIN 6.5 MG
0.4 CAPSULE, EXTENDED RELEASE ORAL
Refills: 0 | Status: DISCONTINUED | OUTPATIENT
Start: 2024-01-29 | End: 2024-01-30

## 2024-01-29 RX ORDER — SODIUM CHLORIDE 9 MG/ML
1000 INJECTION INTRAMUSCULAR; INTRAVENOUS; SUBCUTANEOUS
Refills: 0 | Status: DISCONTINUED | OUTPATIENT
Start: 2024-01-29 | End: 2024-01-30

## 2024-01-29 RX ORDER — NITROGLYCERIN 6.5 MG
0.4 CAPSULE, EXTENDED RELEASE ORAL ONCE
Refills: 0 | Status: DISCONTINUED | OUTPATIENT
Start: 2024-01-29 | End: 2024-01-30

## 2024-01-29 RX ORDER — CLOPIDOGREL BISULFATE 75 MG/1
75 TABLET, FILM COATED ORAL DAILY
Refills: 0 | Status: DISCONTINUED | OUTPATIENT
Start: 2024-01-30 | End: 2024-01-30

## 2024-01-29 RX ORDER — NITROGLYCERIN 6.5 MG
0.4 CAPSULE, EXTENDED RELEASE ORAL
Refills: 0 | Status: DISCONTINUED | OUTPATIENT
Start: 2024-01-29 | End: 2024-01-29

## 2024-01-29 RX ORDER — SODIUM CHLORIDE 9 MG/ML
250 INJECTION INTRAMUSCULAR; INTRAVENOUS; SUBCUTANEOUS ONCE
Refills: 0 | Status: COMPLETED | OUTPATIENT
Start: 2024-01-29 | End: 2024-01-29

## 2024-01-29 RX ORDER — TICAGRELOR 90 MG/1
90 TABLET ORAL EVERY 12 HOURS
Refills: 0 | Status: COMPLETED | OUTPATIENT
Start: 2024-01-29 | End: 2024-01-29

## 2024-01-29 RX ADMIN — HEPARIN SODIUM 550 UNIT(S)/HR: 5000 INJECTION INTRAVENOUS; SUBCUTANEOUS at 04:36

## 2024-01-29 RX ADMIN — SODIUM CHLORIDE 100 MILLILITER(S): 9 INJECTION INTRAMUSCULAR; INTRAVENOUS; SUBCUTANEOUS at 12:25

## 2024-01-29 RX ADMIN — TICAGRELOR 90 MILLIGRAM(S): 90 TABLET ORAL at 21:48

## 2024-01-29 RX ADMIN — Medication 1 TABLET(S): at 12:26

## 2024-01-29 RX ADMIN — Medication 50 MILLIGRAM(S): at 06:13

## 2024-01-29 RX ADMIN — INSULIN GLARGINE 14 UNIT(S): 100 INJECTION, SOLUTION SUBCUTANEOUS at 01:03

## 2024-01-29 RX ADMIN — INSULIN GLARGINE 14 UNIT(S): 100 INJECTION, SOLUTION SUBCUTANEOUS at 21:49

## 2024-01-29 RX ADMIN — Medication 0.4 MILLIGRAM(S): at 00:02

## 2024-01-29 RX ADMIN — HEPARIN SODIUM 0 UNIT(S)/HR: 5000 INJECTION INTRAVENOUS; SUBCUTANEOUS at 03:29

## 2024-01-29 RX ADMIN — ATORVASTATIN CALCIUM 80 MILLIGRAM(S): 80 TABLET, FILM COATED ORAL at 21:48

## 2024-01-29 RX ADMIN — Medication 50 MILLIGRAM(S): at 17:06

## 2024-01-29 RX ADMIN — SACUBITRIL AND VALSARTAN 1 TABLET(S): 24; 26 TABLET, FILM COATED ORAL at 06:49

## 2024-01-29 RX ADMIN — TICAGRELOR 90 MILLIGRAM(S): 90 TABLET ORAL at 10:36

## 2024-01-29 RX ADMIN — Medication 40 MILLIGRAM(S): at 06:13

## 2024-01-29 RX ADMIN — SODIUM CHLORIDE 500 MILLILITER(S): 9 INJECTION INTRAMUSCULAR; INTRAVENOUS; SUBCUTANEOUS at 12:25

## 2024-01-29 RX ADMIN — ATORVASTATIN CALCIUM 80 MILLIGRAM(S): 80 TABLET, FILM COATED ORAL at 01:02

## 2024-01-29 RX ADMIN — HEPARIN SODIUM 550 UNIT(S)/HR: 5000 INJECTION INTRAVENOUS; SUBCUTANEOUS at 11:38

## 2024-01-29 RX ADMIN — SACUBITRIL AND VALSARTAN 1 TABLET(S): 24; 26 TABLET, FILM COATED ORAL at 17:06

## 2024-01-29 NOTE — CONSULT NOTE ADULT - SUBJECTIVE AND OBJECTIVE BOX
CHIEF COMPLAINT: Chest Pain    HPI:  68M w/ Hx of CAD s/p 9 stents (most recent to pLM (70%), pLAD, mLAD on 24 with Dr. Dominguez), HFrEF (EF~50% as of 23), A-fib, HTN, HLD, T2DM, CKD3, former smoker, presents due to chest pain after recent stenting on 24. Patient was told to continue triple therapy with aspirin, plavix and full dose Xarelto for one week and then continue with just plavix and Xarelto afterwards and was compliant. Reports that for the last several days, he has been experiencing non-radiating, non-tender significant chest pain intermittently that worsens with activity or emotional excitation. Reports it feels similar to previous cardiac related chest pains he's had in the past. Spoke to his cardiologist on friday and got labs performed. Reports no other acute complaints and is well on exam currently. Reports he is unsure of last A1c and uses and variable amount of semglee (insulin) every night, it is prescribed 28u per night but he will use as long as 12u a night if his glucose is on the lower end. Patient denies fever, chills,headache, dizziness, abd pain, nausea, vomiting, constipation, dysuria. (2024 23:41)  Patient      PAST MEDICAL & SURGICAL HISTORY:  Former smoker      CAD in native artery      Type 2 diabetes mellitus      Hyperlipidemia, unspecified hyperlipidemia type      Essential hypertension, hypertension with unspecified goal      Afib      Hematoma of leg      Stented coronary artery      CHF (congestive heart failure)      s/p Carotid Endarterectomy  left          Allergies    No Known Allergies    Intolerances        SOCIAL HISTORY    Smoking Hx: None  ETOH Hx: None  Marital Status:   Occupational Hx:     FAMILY HISTORY:  Family history of heart disease  father  age 71    FH: atrial fibrillation  sister        MEDICATIONS:  acetaminophen     Tablet .. 650 milliGRAM(s) Oral every 6 hours PRN  atorvastatin 80 milliGRAM(s) Oral at bedtime  clopidogrel Tablet 75 milliGRAM(s) Oral daily  dextrose 5%. 1000 milliLiter(s) IV Continuous <Continuous>  dextrose 5%. 1000 milliLiter(s) IV Continuous <Continuous>  dextrose 50% Injectable 25 Gram(s) IV Push once  dextrose 50% Injectable 12.5 Gram(s) IV Push once  dextrose 50% Injectable 25 Gram(s) IV Push once  dextrose Oral Gel 15 Gram(s) Oral once PRN  furosemide    Tablet 40 milliGRAM(s) Oral daily  glucagon  Injectable 1 milliGRAM(s) IntraMuscular once  heparin  Infusion.  Unit(s)/Hr IV Continuous <Continuous>  insulin glargine Injectable (LANTUS) 14 Unit(s) SubCutaneous at bedtime  insulin lispro (ADMELOG) corrective regimen sliding scale   SubCutaneous three times a day before meals  melatonin 3 milliGRAM(s) Oral at bedtime PRN  metoprolol tartrate 50 milliGRAM(s) Oral two times a day  multivitamin 1 Tablet(s) Oral daily  nitroglycerin     SubLingual 0.4 milliGRAM(s) SubLingual once  nitroglycerin     SubLingual 0.4 milliGRAM(s) SubLingual every 5 minutes PRN  sacubitril 49 mG/valsartan 51 mG 1 Tablet(s) Oral two times a day      REVIEW OF SYSTEMS:    CONSTITUTIONAL: No weakness, fevers or chills  EYES/ENT: No visual changes;  No vertigo or throat pain   NECK: No pain or stiffness  RESPIRATORY: No cough, wheezing, hemoptysis; No shortness of breath  CARDIOVASCULAR: No chest pain or palpitations  GASTROINTESTINAL: No abdominal or epigastric pain. No nausea, vomiting, or hematemesis; No diarrhea or constipation. No melena or hematochezia.  GENITOURINARY: No dysuria, frequency or hematuria  NEUROLOGICAL: No numbness or weakness  SKIN: No itching, burning, rashes, or lesions   All other review of systems is negative unless indicated above    Vital Signs Last 24 Hrs  T(C): 36.4 (2024 09:23), Max: 36.7 (2024 19:15)  T(F): 97.5 (2024 09:23), Max: 98 (2024 19:15)  HR: 79 (2024 09:23) (74 - 94)  BP: 149/72 (2024 09:23) (118/76 - 181/111)  BP(mean): 103 (2024 09:23) (90 - 125)  RR: 18 (2024 09:23) (16 - 20)  SpO2: 100% (2024 09:23) (96% - 100%)    Parameters below as of 2024 09:23  Patient On (Oxygen Delivery Method): room air        I&O's Summary      PHYSICAL EXAM:    Constitutional: NAD, awake and alert, well-developed  HEENT: PERR, EOMI  Neck: soft and supple, No LAD, No JVD  Respiratory: Breath sounds are clear bilaterally, No wheezing, rales or rhonchi  Cardiovascular: Regular rate and rhythm, normal S1 and S2,  no murmurs, gallops or rubs  Gastrointestinal: Bowel Sounds present, soft, nontender.   Extremities: No peripheral edema. No clubbing or cyanosis.  Vascular: 2+ peripheral pulses  Neurological: A/O x 3, no focal deficits  Musculoskeletal: no calf tenderness.  Skin: No rashes.      LABS: All Labs Reviewed:                        12.5   9.47  )-----------( 166      ( 2024 05:44 )             36.4                         12.2   10.48 )-----------( 179      ( 2024 02:59 )             35.9                         13.1   9.82  )-----------( 195      ( 2024 00:57 )             39.4     2024 05:44    140    |  107    |  37     ----------------------------<  93     4.3     |  24     |  1.88   2024 00:56    138    |  104    |  39     ----------------------------<  143    4.7     |  22     |  2.08   2024 19:20    138    |  102    |  39     ----------------------------<  178    4.9     |  25     |  2.04     Ca    9.3        2024 05:44  Ca    9.8        2024 00:56  Ca    9.7        2024 19:20    TPro  5.7    /  Alb  3.8    /  TBili  2.1    /  DBili  x      /  AST  76     /  ALT  39     /  AlkPhos  82     2024 05:44  TPro  6.1    /  Alb  4.0    /  TBili  1.9    /  DBili  x      /  AST  91     /  ALT  41     /  AlkPhos  94     2024 00:56  TPro  6.5    /  Alb  4.3    /  TBili  1.7    /  DBili  x      /  AST  93     /  ALT  45     /  AlkPhos  98     2024 19:20    PT/INR - ( 2024 02:59 )   PT: 22.6 sec;   INR: 2.20 ratio         PTT - ( 2024 02:59 )  PTT:98.7 sec  Blood Culture:     CARDIAC MARKERS:    Troponin T,  serum   260 ( @ 00:56)  319 ( @ 19:20)  130 ( @ 07:23)  115 ( @ 20:59)  138 ( @ 14:08)  180 ( @ 06:58)  165 ( @ 00:46)  209 ( @ 20:59)  231 ( @ 16:58)              RADIOLOGY/EKG: Atrial fibrillation with moderate ventricular response. No ischemic changes.

## 2024-01-29 NOTE — DISCHARGE NOTE PROVIDER - CARE PROVIDER_API CALL
Morris Santana  Cardiovascular Disease  [address], [address]  [place] [address]  Phone: [phone]  Fax: [phone]  Established Patient  Follow Up Time: 2 weeks   Alfred Moon  Cardiology  [address] [place] [address]  Phone: [phone]  Fax: [phone]  Established Patient  Follow Up Time: 2 weeks   Alfred Moon  Cardiology  [address] [place] [address]  Phone: [phone]  Fax: [phone]  Established Patient  Follow Up Time: 1-3 days

## 2024-01-29 NOTE — DISCHARGE NOTE PROVIDER - NSDCCPCAREPLAN_GEN_ALL_CORE_FT
PRINCIPAL DISCHARGE DIAGNOSIS  Diagnosis: Non-ST elevation MI (NSTEMI)  Assessment and Plan of Treatment: You had an angioplasty.You may have also had a stent placed. Both of these were done to open narrowed or blocked coronary arteries, the blood vessels that supply blood to your heart. Complete recovery takes a week or less. Keep the area where the catheter was inserted dry for 24 to 48 hours.Walking short distances on a flat surface is OK. Limit going up and down stairs to around 2 times a day for the first 2 to 3 days.Don't do yard work, drive, squat, carry heavy objects, or play sports for at least 2 days, or until your health care provider tells you it is safe. Avoid sexual activity for 2 to 5 days. Ask your provider when it will be OK to start again.Don't take a bath or swim for the first week. You may take showers, but make sure the area where the catheter was inserted does not get wet for the first 24 to 48 hours.If your incision bleeds or swells up, lie down and put pressure on it for 30 minutes.   Eat a heart-healthy diet, exercise, stop smoking (if you smoke), and reduce stress to help lower your chances of having a blocked artery again. Your provider may give you medicine to help lower your cholesterol. Take the medicines exactly as your provider tells you. Do not stop taking them without talking with your provider first.Make sure you have a follow-up appointment scheduled with your heart care provider (cardiologist).  Contact your provider if:  There is bleeding at the catheter insertion site that does not stop when you apply pressure.  There is swelling at the catheter site.  Your leg or arm below where the catheter was inserted changes color, becomes cool to touch, or is numb.  The small incision for your catheter becomes red or painful, or yellow or green discharge is draining from it.  You have chest pain or shortness of breath that does not go away with rest.  Your pulse feels irregular -- very slow (fewer than 60 beats), or very fast (over 100 to 120 beats) a minute.  You have dizziness, fainting, or you are very tired.       PRINCIPAL DISCHARGE DIAGNOSIS  Diagnosis: Non-ST elevation MI (NSTEMI)  Assessment and Plan of Treatment: Complete recovery takes a week or less. Keep the area where the catheter was inserted dry for 24 to 48 hours.Walking short distances on a flat surface is OK. Limit going up and down stairs to around 2 times a day for the first 2 to 3 days.Don't do yard work, drive, squat, carry heavy objects, or play sports for at least 2 days, or until your health care provider tells you it is safe. Avoid sexual activity for 2 to 5 days. Ask your provider when it will be OK to start again.Don't take a bath or swim for the first week. You may take showers, but make sure the area where the catheter was inserted does not get wet for the first 24 to 48 hours.If your incision bleeds or swells up, lie down and put pressure on it for 30 minutes.   Eat a heart-healthy diet, exercise, stop smoking (if you smoke), and reduce stress to help lower your chances of having a blocked artery again. Your provider may give you medicine to help lower your cholesterol. Take the medicines exactly as your provider tells you. Do not stop taking them without talking with your provider first.Make sure you have a follow-up appointment scheduled with your heart care provider (cardiologist).  Contact your provider if:  There is bleeding at the catheter insertion site that does not stop when you apply pressure.  There is swelling at the catheter site.  Your leg or arm below where the catheter was inserted changes color, becomes cool to touch, or is numb.  The small incision for your catheter becomes red or painful, or yellow or green discharge is draining from it.  You have chest pain or shortness of breath that does not go away with rest.  Your pulse feels irregular -- very slow (fewer than 60 beats), or very fast (over 100 to 120 beats) a minute.  You have dizziness, fainting, or you are very tired.

## 2024-01-29 NOTE — CHART NOTE - NSCHARTNOTEFT_GEN_A_CORE
Contacted by Nursing Staff via teams that patient was in new onset active chest pain that started at rest. Immediately evaluated the patient and obtained EKG. Pt received PRN order of Nitro to relieve chest pain. On exam, patient complaining of crushing substernal chest pain non-radiating that occurred at rest associated with SOB. Chest pain was quickly relieved with Nitro. EKG had lead reversal but concerning for possible infero-lateral ischemia. Cardiology fellow was contacted and repeat EKG after nitro given and chest pain resolved was taken. Newest EKG did not reflect ST-T wave changes, but only A-Fib that was rate controlled. Cardiology fellow evaluated the patient and recommended ordering troponin, CK, CKMB and lactate, STAT. Pt appeared well and calm after one dose of nitro and had no further complaints. Patient denies fever, chills, headache, dizziness, abd pain, nausea, vomiting, constipation, dysuria.    T(C): 36.7 (01-28-24 @ 19:15), Max: 36.7 (01-28-24 @ 19:15)  HR: 74 (01-28-24 @ 19:15) (74 - 76)  BP: 147/85 (01-28-24 @ 19:15) (147/58 - 147/85)  RR: 18 (01-28-24 @ 19:15) (18 - 20)  SpO2: 99% (01-28-24 @ 19:15) (98% - 99%)    CONSTITUTIONAL: In moderate to severe distress, then relieved.  EYES: PERRLA and symmetric, EOMI, No conjunctival or scleral injection, non-icteric  ENMT: Oral mucosa with moist membranes.  NECK: Supple, symmetric. No JVD.  RESP: No respiratory distress, no use of accessory muscles; CTA b/l, no WRR  CV: Irregularly Irregular, +S1S2, no MRG  GI: Soft, NT, ND, no rebound, no guarding  : No suprapubic tenderness. No CVA tenderness.  LYMPH: No cervical LAD or tenderness  MSK: No spinal tenderness, normal muscle strength/tone  EXTREMITIES: No pedal edema  SKIN: No rashes or ulcers noted  NEURO: A+Ox3, responsive. No tremor, sensation intact in upper and lower extremities b/l  PSYCH: Appropriate insight/judgment; mood and affect appropriate, recent/remote memory intact    A&P:  - EKG improved  -  Tele monitoring  - Likely for [facility] in AM  - Cw heparin drip  - Pt received aspirin and brillinta  - F/u STAT troponin, lactate, CK, CKMB  - F/u TTE    x minutes spent on total encounter which excludes time spent teaching.  Independently obtaining a history, performing a physical examination, discussing the plan with the patient, ordering medications/tests, documenting clinical information, and coordinating care. Contacted by Nursing Staff via teams that patient was in new onset active chest pain that started at rest. Immediately evaluated the patient and obtained EKG. Pt received PRN order of Nitro to relieve chest pain. On exam, patient complaining of crushing substernal chest pain non-radiating that occurred at rest associated with SOB. Chest pain was quickly relieved with Nitro. EKG had lead reversal but concerning for possible infero-lateral ischemia. Cardiology fellow was contacted and repeat EKG after nitro given and chest pain resolved was taken. Newest EKG did not reflect ST-T wave changes, but only A-Fib that was rate controlled. Cardiology fellow evaluated the patient and recommended ordering troponin, CK, CKMB and lactate, STAT. Pt appeared well and calm after one dose of nitro and had no further complaints. Patient denies fever, chills, headache, dizziness, abd pain, nausea, vomiting, constipation, dysuria.    T(C): 36.7 (01-28-24 @ 19:15), Max: 36.7 (01-28-24 @ 19:15)  HR: 74 (01-28-24 @ 19:15) (74 - 76)  BP: 147/85 (01-28-24 @ 19:15) (147/58 - 147/85)  RR: 18 (01-28-24 @ 19:15) (18 - 20)  SpO2: 99% (01-28-24 @ 19:15) (98% - 99%)    CONSTITUTIONAL: In moderate to severe distress, then relieved.  EYES: PERRLA and symmetric, EOMI, No conjunctival or scleral injection, non-icteric  ENMT: Oral mucosa with moist membranes.  NECK: Supple, symmetric. No JVD.  RESP: No respiratory distress, no use of accessory muscles; CTA b/l, no WRR  CV: Irregularly Irregular, +S1S2, no MRG  GI: Soft, NT, ND, no rebound, no guarding  : No suprapubic tenderness. No CVA tenderness.  LYMPH: No cervical LAD or tenderness  MSK: No spinal tenderness, normal muscle strength/tone  EXTREMITIES: No pedal edema  SKIN: No rashes or ulcers noted  NEURO: A+Ox3, responsive. No tremor, sensation intact in upper and lower extremities b/l  PSYCH: Appropriate insight/judgment; mood and affect appropriate, recent/remote memory intact    A&P:  - EKG improved  -  Tele monitoring  - Likely for [facility] in AM  - Cw heparin drip  - Pt received aspirin and brillinta  - F/u STAT troponin, lactate, CK, CKMB  - F/u TTE    52 minutes spent on total encounter. The necessity of the time spent during this subsequent encounter on this date of service is due to:  Independently obtaining a history, performing a physical examination, discussing the plan with the patient, ordering medications/tests, documenting clinical information, and coordinating care.

## 2024-01-29 NOTE — DISCHARGE NOTE PROVIDER - NSDCFUADDAPPT_GEN_ALL_CORE_FT
NEED BLOOD TEST TO CHECK KIDNEY FUNCTION IN 2 OR 3 DAYS WITH YOUR PRIMARY OR CARDIOLOGIST NEED BLOOD TEST TO CHECK KIDNEY FUNCTION IN 2 OR 3 DAYS WITH YOUR PRIMARY OR CARDIOLOGIST. FOLLOW UP WITH RENAL DR DEYVI STRICKLAND AS SCHEDULED NEED BLOOD TEST TO CHECK KIDNEY FUNCTION IN 2 OR 3 DAYS WITH YOUR PRIMARY OR CARDIOLOGIST. FOLLOW UP WITH RENAL DR DEYVI STRICKLAND AS SCHEDULED      Pt has scheduled follow up with Dr Pemberton in 48 hours  NEED BLOOD TEST TO CHECK KIDNEY FUNCTION IN 2 OR 3 DAYS WITH YOUR PRIMARY OR CARDIOLOGIST. FOLLOW UP WITH RENAL DR DEYVI STRICKLAND AS SCHEDULED      Pt has scheduled follow up with Dr Pemberton in 48 hours (2/1 Thursday)

## 2024-01-29 NOTE — DISCHARGE NOTE PROVIDER - CARE PROVIDERS DIRECT ADDRESSES
[email or web address]

## 2024-01-29 NOTE — ED ADULT NURSE REASSESSMENT NOTE - NS ED NURSE REASSESS COMMENT FT1
MD Keys at bedside. MD Keys made aware that patient's aPTT is 98.7. RN to stop heparin for 1 hour and restart at rate of 5.5mL per hour. Safety and comfort provided.

## 2024-01-29 NOTE — CONSULT NOTE ADULT - ASSESSMENT
68M w/ Hx of CAD s/p 9 stents (most recent to pLM (70%), pLAD, mLAD on 1/8/24 with Dr. Dominguez), HFrEF (EF~50% as of 11/26/23), A-fib, HTN, HLD, T2DM, CKD3, former smoker, presents due to chest pain after recent stenting on 1/8/24. Patient had chest pain last night.  Had a NQWMI.  Plavix was changed to brilinta.  He was loaded with brilinta.  Await cardiac cath by Dr. Lopez for likely further progression of CAD.  Further treatment based on above cath  For now continue current medications

## 2024-01-29 NOTE — DISCHARGE NOTE PROVIDER - NSDCMRMEDTOKEN_GEN_ALL_CORE_FT
acetaminophen 325 mg oral tablet: 2 tab(s) orally every 6 hours As needed Temp greater or equal to 38C (100.4F), Moderate Pain (4 - 6)  atorvastatin 80 mg oral tablet: 1 tab(s) orally once a day (at bedtime)  clopidogrel 75 mg oral tablet: 1 tab(s) orally once a day  Entresto 49 mg-51 mg oral tablet: 1 tab(s) orally 2 times a day  ezetimibe 10 mg oral tablet: 1 tab(s) orally once a day  Farxiga 10 mg oral tablet: 1 tab(s) orally once a day  furosemide 40 mg oral tablet: 1 tab(s) orally once a day  metoprolol tartrate 50 mg oral tablet: 1 tab(s) orally 2 times a day  multivitamin gummies: 1 gummy orally once a day  nitroglycerin 0.4 mg sublingual tablet: 1 tab(s) sublingually as needed  rivaroxaban 15 mg oral tablet: 1 tab(s) orally once a day (before a meal)  Semglee (Prefilled Pen) 100 units/mL subcutaneous solution: 28 unit(s) subcutaneous once a day (at bedtime)   acetaminophen 325 mg oral tablet: 2 tab(s) orally every 6 hours As needed Temp greater or equal to 38C (100.4F), Moderate Pain (4 - 6)  atorvastatin 80 mg oral tablet: 1 tab(s) orally once a day (at bedtime)  clopidogrel 75 mg oral tablet: 1 tab(s) orally once a day  Entresto 49 mg-51 mg oral tablet: 1 tab(s) orally 2 times a day  ezetimibe 10 mg oral tablet: 1 tab(s) orally once a day  Farxiga 10 mg oral tablet: 1 tab(s) orally once a day  furosemide 40 mg oral tablet: 1 tab(s) orally once a day  metoprolol tartrate 50 mg oral tablet: 1 tab(s) orally 2 times a day  multivitamin gummies: 1 gummy orally once a day  nitroglycerin 0.4 mg sublingual tablet: 1 tab(s) sublingually once as needed for chest pain every 5 mins MAX DOSE 3 tab  rivaroxaban 15 mg oral tablet: 1 tab(s) orally once a day (before a meal)  Semglee (Prefilled Pen) 100 units/mL subcutaneous solution: 28 unit(s) subcutaneous once a day (at bedtime)   acetaminophen 325 mg oral tablet: 2 tab(s) orally every 6 hours As needed Temp greater or equal to 38C (100.4F), Moderate Pain (4 - 6)  atorvastatin 80 mg oral tablet: 1 tab(s) orally once a day (at bedtime)  clopidogrel 75 mg oral tablet: 1 tab(s) orally once a day  Entresto 49 mg-51 mg oral tablet: 1 tab(s) orally 2 times a day  ezetimibe 10 mg oral tablet: 1 tab(s) orally once a day  Farxiga 10 mg oral tablet: 1 tab(s) orally once a day  furosemide 40 mg oral tablet: 1 tab(s) orally once a day  isosorbide mononitrate 30 mg oral tablet, extended release: 1 tab(s) orally once a day MDD: 1 tab  metoprolol tartrate 50 mg oral tablet: 1 tab(s) orally 2 times a day  multivitamin gummies: 1 gummy orally once a day  nitroglycerin 0.4 mg sublingual tablet: 1 tab(s) sublingually once as needed for chest pain every 5 mins MAX DOSE 3 tab  Semglee (Prefilled Pen) 100 units/mL subcutaneous solution: 28 unit(s) subcutaneous once a day (at bedtime)   acetaminophen 325 mg oral tablet: 2 tab(s) orally every 6 hours As needed Temp greater or equal to 38C (100.4F), Moderate Pain (4 - 6)  atorvastatin 80 mg oral tablet: 1 tab(s) orally once a day (at bedtime)  clopidogrel 75 mg oral tablet: 1 tab(s) orally once a day  Entresto 49 mg-51 mg oral tablet: 1 tab(s) orally 2 times a day  ezetimibe 10 mg oral tablet: 1 tab(s) orally once a day  Farxiga 10 mg oral tablet: 1 tab(s) orally once a day  furosemide 40 mg oral tablet: 1 tab(s) orally once a day  isosorbide mononitrate 30 mg oral tablet, extended release: 1 tab(s) orally once a day MDD: 1 tab  metoprolol tartrate 25 mg oral tablet: 1 tab(s) orally 2 times a day MDD: 2 tab  multivitamin gummies: 1 gummy orally once a day  Semglee (Prefilled Pen) 100 units/mL subcutaneous solution: 28 unit(s) subcutaneous once a day (at bedtime)

## 2024-01-29 NOTE — DISCHARGE NOTE PROVIDER - NSDCFUADDINST_GEN_ALL_CORE_FT
Wound Care:   the day AFTER your procedure remove bandage GENTLY, and clean using  mild soap and gentle warm, water stream, pat dry. leave OPEN to air. YOU MAY SHOWER   DO NOT apply lotions, creams, ointments, powder, parfumes to your incision site  DO NOT SOAK your site for 1 week ( no baths, no pools, no tubs, etc...)  Check  your groin and /or wrist daily. A small amount of bruising, and soreness are normal    ACTIVITY: for 24 hours   - DO NOT DRIVE  - DO NOT make any important decisions or sign legal documents   - DO NOT operate heavy machinaries   - you may resume sexual activity in 48 hours, unless otherwise instructed by your cardiologist     If your procedure was done through the WRIST: for the NEXT 3 DAYS:  - avoid pushing, pulling, with that affected wrist   - avoid repeated movement of that hand and wrist ( eg: typing, hammering)  - DO NOT LIFT anything more than 5 lbs     If your procedure was done through the GROIN: for the NEXT 5 DAYS  - Limit climbing stairs, DO NOT soak in bathtub or pool  - no strenous activities, pushing, pulling, straining  - Do not lift anything 10lbs or heavier     MEDICATION:   take your medications as explained ( see discharge paperwork)   If you received a STENT, you will be taking antiplatelet medications to KEEP YOUR STENT OPEN ( eg: Plavix, ).  Take as prescribed DO NOT STOP taking them without consulting with your cardiologist first. 	Resume your xarelto tonight as prescribed    Follow heart healthy diet reccomended by your doctor, , if you smoke STOP SMOKING ( may call [phone] for center of tobacco control if you need assistance)     CALL your doctor to make appointment in 2 WEEKS     ***CALL YOUR DOCTOR***  if you experience: fever, chills, body aches, or severe pain, swelling, redness, heat or yellow discharge at incision site  If you experience Bleeding or excruciating pain at the procedural site, sweliing ( golf ball size) at your procedural site  If you experience CHEST PAIN  If you experience extremity numbness, tingling, temperature change ( of your procedural site)   If you are unable to reach your doctor, you may contact:   -Cardiology Office at [facility] at [phone] or   - [facility] [phone]  - [facility] [phone]

## 2024-01-29 NOTE — DISCHARGE NOTE PROVIDER - HOSPITAL COURSE
68M w/ Hx of CAD s/p 9 stents (most recent to pLM (70%), pLAD, mLAD on 1/8/24 with Dr. Dominguez), HFrEF (EF~50% as of 11/26/23), A-fib, HTN, HLD, T2DM, CKD3, former smoker, presents due to chest pain after recent stenting on 1/8/24. Patient was told to continue triple therapy with aspirin, plavix and full dose Xarelto for one week and then continue with just plavix and Xarelto afterwards and was compliant. Reports that for the last several days, he has been experiencing non-radiating, non-tender significant chest pain intermittently that worsens with activity or emotional excitation. Reports it feels similar to previous cardiac related chest pains he's had in the past. Spoke to his cardiologist on friday and got labs performed. Reports no other acute complaints and is well on exam currently. Reports he is unsure of last A1c and uses and variable amount of semglee every night, it is prescribed 28u per night but he will use as long as 12u a night if his glucose is on the lower end. (28 Jan 2024 23:41). Pt underwent cardiac cath that was unrevealing.    68M w/ Hx of CAD s/p 9 stents (most recent to pLM (70%), pLAD, mLAD on 1/8/24 with Dr. Dominguez), HFrEF (EF~50% as of 11/26/23), A-fib, HTN, HLD, T2DM, CKD3, former smoker, presents due to chest pain after recent stenting on 1/8/24. Patient was told to continue triple therapy with aspirin, plavix and full dose Xarelto for one week and then continue with just plavix and Xarelto afterwards and was compliant. Reports that for the last several days, he has been experiencing non-radiating, non-tender significant chest pain intermittently that worsens with activity or emotional excitation. Reports it feels similar to previous cardiac related chest pains he's had in the past. Spoke to his cardiologist on friday and got labs performed. Reports no other acute complaints and is well on exam currently. Reports he is unsure of last A1c and uses and variable amount of semglee every night, it is prescribed 28u per night but he will use as long as 12u a night if his glucose is on the lower end. (28 Jan 2024 23:41). Pt underwent cardiac cath that was unrevealing.   1/30  s/p LHC - patent stents   For dc home today - on plavix, xarelto will be resumed today. continue statin  will follow up with Dr Santana cardiologist.  Rpt BMP in 3 days - to monitor Cr/Bun 68M w/ Hx of CAD s/p 9 stents (most recent to pLM (70%), pLAD, mLAD on 1/8/24 with Dr. Dominguez), HFrEF (EF~50% as of 11/26/23), A-fib, HTN, HLD, T2DM, CKD3, former smoker, presents due to chest pain after recent stenting on 1/8/24. Patient was told to continue triple therapy with aspirin, plavix and full dose Xarelto for one week and then continue with just plavix and Xarelto afterwards and was compliant. Reports that for the last several days, he has been experiencing non-radiating, non-tender significant chest pain intermittently that worsens with activity or emotional excitation. Reports it feels similar to previous cardiac related chest pains he's had in the past. Spoke to his cardiologist on friday and got labs performed. Reports no other acute complaints and is well on exam currently. Reports he is unsure of last A1c and uses and variable amount of semglee every night, it is prescribed 28u per night but he will use as long as 12u a night if his glucose is on the lower end. (28 Jan 2024 23:41). Pt underwent cardiac cath that was unrevealing.   1/30  s/p LHC - mod dLAD disease -unchanged with patent stents. Ambulating - no CP or SOB today  Per Dr Lopez- discharge today on Plavix and resume Xarelto tonight  Add Imdur 30mg for control of anginal symptoms  Continue statin and other meds   Patient instructed to follow up with Dr Jack private cards in 1-2 weeks     Rpt BMP in 3 days - to monitor Cr/Bun. Follow up with renal Dr Christophe Walton as scheduled 68M w/ Hx of CAD s/p 9 stents (most recent to pLM (70%), pLAD, mLAD on 1/8/24 with Dr. Dominguez), HFrEF (EF~50% as of 11/26/23), A-fib, HTN, HLD, T2DM, CKD3, former smoker, presents due to chest pain after recent stenting on 1/8/24. Patient was told to continue triple therapy with aspirin, plavix and full dose Xarelto for one week and then continue with just plavix and Xarelto afterwards and was compliant. Reports that for the last several days, he has been experiencing non-radiating, non-tender significant chest pain intermittently that worsens with activity or emotional excitation. Reports it feels similar to previous cardiac related chest pains he's had in the past. Spoke to his cardiologist on friday and got labs performed. Reports no other acute complaints and is well on exam currently. Reports he is unsure of last A1c and uses and variable amount of semglee every night, it is prescribed 28u per night but he will use as long as 12u a night if his glucose is on the lower end. (28 Jan 2024 23:41). Pt underwent cardiac cath that was unrevealing.   1/30  s/p LHC - mod dLAD disease -unchanged with patent stents. Ambulating - no CP or SOB today  Per Dr Lopez- discharge today on Plavix and resume Xarelto tonight  Add Imdur 30mg for control of anginal symptoms as dw Dr Lopez/Violeta  Continue statin and other meds   Patient instructed to follow up with Dr Jack private cards in 1-2 weeks     Rpt BMP in 3 days - to monitor Cr/Bun. Follow up with renal Dr Christophe Walton as scheduled 68M w/ Hx of CAD s/p 9 stents (most recent to pLM (70%), pLAD, mLAD on 1/8/24 with Dr. Dominguez), HFrEF (EF~50% as of 11/26/23), A-fib, HTN, HLD, T2DM, CKD3, former smoker, presents due to chest pain after recent stenting on 1/8/24. Patient was told to continue triple therapy with aspirin, plavix and full dose Xarelto for one week and then continue with just plavix and Xarelto afterwards and was compliant. Reports that for the last several days, he has been experiencing non-radiating, non-tender significant chest pain intermittently that worsens with activity or emotional excitation. Reports it feels similar to previous cardiac related chest pains he's had in the past. Spoke to his cardiologist on friday and got labs performed. Reports no other acute complaints and is well on exam currently. Reports he is unsure of last A1c and uses and variable amount of semglee every night, it is prescribed 28u per night but he will use as long as 12u a night if his glucose is on the lower end. (28 Jan 2024 23:41). Pt underwent cardiac cath that was unrevealing.   1/30  s/p LHC - mod dLAD disease -unchanged with patent stents. Ambulating - no CP or SOB today  Per Dr Lopez- discharge today on Plavix and resume Xarelto tonight  Add Imdur 30mg for control of anginal symptoms as dw Dr Lopez/Violeta  Continue statin and other meds   Patient instructed to follow up with Dr Jack private cards in 1-2 weeks     Rpt BMP in 3 days - to monitor Cr/Bun. Follow up with renal Dr Christophe Walton as scheduled   Patient seen by Dr Mcdaniel and Renal dr Walton - cleared for [facility] 68M w/ Hx of CAD s/p 9 stents (most recent to pLM (70%), pLAD, mLAD on 1/8/24 with Dr. Dominguez), HFrEF (EF~50% as of 11/26/23), A-fib, HTN, HLD, T2DM, CKD3, former smoker, presents due to chest pain after recent stenting on 1/8/24. Patient was told to continue triple therapy with aspirin, plavix and full dose Xarelto for one week and then continue with just plavix and Xarelto afterwards and was compliant. Reports that for the last several days, he has been experiencing non-radiating, non-tender significant chest pain intermittently that worsens with activity or emotional excitation. Reports it feels similar to previous cardiac related chest pains he's had in the past. Spoke to his cardiologist on friday and got labs performed. Reports no other acute complaints and is well on exam currently. Reports he is unsure of last A1c and uses and variable amount of semglee every night, it is prescribed 28u per night but he will use as long as 12u a night if his glucose is on the lower end. (28 Jan 2024 23:41). Pt underwent cardiac cath that was unrevealing.   1/30  s/p LHC - mod dLAD disease -unchanged from cath 1/8/24 with patent stents. Ambulating - no CP or SOB today. Groin site stable - no hematoma or bleeding +DP  Per Dr Lopez- discharge today on Plavix and resume Xarelto tonight. No ASA  Add Imdur 30mg for control of anginal symptoms as dw Dr Lopez/Violeta  Continue statin and other meds   Patient instructed to follow up with Dr Jack private cards in 1-2 weeks (has apt on 2/1 already scheduled)     Rpt BMP in 3 days - to monitor Cr/Bun. Follow up with renal Dr Christophe Walton as scheduled   Patient seen by Dr Mcdaniel and Renal dr Walton - cleared for [facility]

## 2024-01-29 NOTE — PATIENT PROFILE ADULT - FUNCTIONAL ASSESSMENT - BASIC MOBILITY 1.
Patient: Rock Guerrero   : 1989 MRN: [id number]    SUBJECTIVE:  Chief Complaint   Patient presents with   • Follow-up   • Hair/Scalp Problem     Dandruff        A 33 year old male presents for follow-up of multiple medical conditions, and evaluation dandruff.     Patient has given consent to record this visit for documentation in their clinical record.    HISTORY OF PRESENT ILLNESS:  Historian: Self.    1. Slow transit constipation: Mentions he noticed blood in the stool on the next day after coming from ER.He was constipated. Mentions regular bowel movements now. Admits using Miralax as needed.     2. Ureteral stone: Currently, on Flomax. Mentions going to ER recently for  Left lower abdominal  severe pain. Pain is improving now. He didn't notice kidney stone pass. Denies any blood in the urine. Nausea is decreased. Admits drinking more water.     3. Chronic midline thoracic back pain: Not on any muscle relaxants. Mentions he just took one or two and then stopped. Continuing with physical therapy.     4. Left-hand pain: Mentions having tightness. Numbness and tingling sensation occasionally. Mentions playing video games. Pinching in the thenar eminence below the thumb last several days.  No injury    5. Generalized anxiety disorder: Currently, on Sertraline as needed. Does not take daily.  Mentions feeling better. Working with meditation.     6. Medication monitoring encounter: Denies using Truvada. Admits using condoms while having sex.     7. Dandruff: Mentions having dandruff. Mentions having cracking, and bleeding at times. Requests for refills, ketoconazole and  hydrocortisone. Mentions if he doesn't moisturize his skin after washing, it will become dry, and little flakes of skin come off.       PAST MEDICAL HISTORY:  Past Medical History:   Diagnosis Date   • Cluster headache syndrome      MEDICATIONS:  Current Outpatient Medications   Medication Sig   • tamsulosin (FLOMAX) 0.4 MG Cap TAKE 1 CAPSULE BY  MOUTH ONCE DAILY FOR 14 DAYS   • emtricitabine-tenofovir DISOPROXIL (TRUVADA) 200-300 MG per tablet Take 1 tablet by mouth daily.   • ketoconazole (NIZORAL) 2 % shampoo Use 2-3 times week   • hydroCORTisone (CORTIZONE) 2.5 % cream Apply 1 application topically 2 times daily for 10 days   • sertraline (ZOLOFT) 50 MG tablet 1/2 tab for 7 days then 1 tab daily   • sumatriptan (IMITREX) 100 MG tablet Take 1 tablet by mouth at onset of migraine. May repeat after 2 hours if needed.   • Multiple Vitamins-Minerals (VITAMIN - THERAPEUTIC MULTIVITAMINS W/MINERALS) Tab Take 1 tablet by mouth daily.     No current facility-administered medications for this visit.     ALLERGIES:  ALLERGIES:   Allergen Reactions   • Cat Dander HIVES   • Seasonal Runny Nose     PAST SURGICAL HISTORY:  History reviewed. No pertinent surgical history.  FAMILY HISTORY:  Family History   Problem Relation Age of Onset   • Hypertension Mother    • Hyperlipidemia Mother      SOCIAL HISTORY:  Social History     Tobacco Use   Smoking Status Some Days   Smokeless Tobacco Never     Social History     Substance and Sexual Activity   Alcohol Use Yes    Comment: 14       REVIEW OF SYSTEMS:    Constitutional: Negative for fever and chills.  Skin: Dryness of scalp and face  HEENT: Negative for headache, eye drainage, rhinorrhea, ear pain, sore throat.  Respiratory: Negative for cough, wheezing or shortness of breath.   Cardiovascular: Negative for chest pain, sweating, chest pressure or palpitations.  Gastrointestinal: Negative for nausea, vomiting, diarrhea, heartburn. Positive for constipation.   Genitourinary: Negative for dysuria, urgency, frequency or hematuria.  Extremities: Negative for joint swelling.  Neurologic: Negative for change in sensory or motor function.    OBJECTIVE:  Vitals:    02/20/23 0917   BP: 118/76   BP Location: RUE - Right upper extremity   Patient Position: Sitting   Cuff Size: Regular   Pulse: 65   Resp: 16   Temp: 98.6 °F (37 °C)    TempSrc: Temporal   SpO2: 96%   Weight: 83.1 kg (183 lb 4.8 oz)   Height: 6' (1.829 m)   PainSc: 2    PainLoc: Back     Body mass index is 24.86 kg/m².      PHYSICAL EXAM:    Constitutional: Alert, in no acute distress and current vital signs reviewed.   Head and Face: Atraumatic and normocephalic.   Eyes: No discharge, no eyelid swelling and the sclerae were normal.   ENT: Oropharynx normal. Normal appearing outer ear. Tympanic membranes are bilaterally clear, normal appearing nose and normal lips.   Neck: Normal appearing neck and supple neck.   Pulmonary: Breath sounds clear to auscultation bilaterally, but no respiratory distress and normal respiratory rate and effort.   Cardiovascular: Normal rate, regular rhythm, normal S1, normal S2 and edema was not present in the lower extremities.   Musculoskeletal: Back: Tenderness on palpation over left shoulder blade. Pulling sensation on looking up. Normal range of movements.   Left hand: Tenderness on palpation over thenar eminence. Normal range of movements.   Abdomen: Soft and nontender.   Skin: Scalp looks flaky on examination. Dry patch over forehead. No underlying erythema.   Psychiatric: Alert and awake, interactive and mood/affect were appropriate.      ASSESSMENT AND PLAN:  This is a 33 year old male who presents with :  1. Slow transit constipation  Continue taking Miralax. Increase fiber.    2. Ureteral stone  Continue with Flomax as prescribed.  Provided refill for tamsulosin (FLOMAX) 0.4 MG Cap.  Follow up urology    3. Chronic midline thoracic back pain  Continue with physical therapy.    4. Left hand pain  It may be due to overuse syndrome.   Advised to give rest for week, and see if symptoms are resolved. Inform if symptoms still persist.   Advised to stop playing video game.  Advised doing some stretching exercise.     5. Generalized anxiety disorder  Informed Sertraline should be taken regularly daily, not as needed.   Continue Sertraline 25 mg  daily.     6. Medication monitoring encounter/PRep  Prescribed emtricitabine-tenofovir DISOPROXIL (TRUVADA) 200-300 MG per tablet    7. Seborrheic dermatitis of scalp  Prescribed ketoconazole (NIZORAL) 2 % shampoo.  Prescribed Hydrocortisone (CORTIZONE) 2.5 % cream.   Use it daily for 10 days, and after that use it as needed.  Advised to keep skin hydrated.    Follow-up in three months.        Orders Placed This Encounter   • tamsulosin (FLOMAX) 0.4 MG Cap   • emtricitabine-tenofovir DISOPROXIL (TRUVADA) 200-300 MG per tablet   • ketoconazole (NIZORAL) 2 % shampoo   • hydroCORTisone (CORTIZONE) 2.5 % cream         Refer to orders.  Medical compliance with plan discussed and risks of non-compliance reviewed.  Patient education completed on disease process, etiology & prognosis.  Proper usage and side effects of medications reviewed & discussed.  Patient understands and agrees with the plan.  Return to clinic as clinically indicated as discussed with patient who verbalized understanding of the plan and is in agreement with the plan.    I,  Dr. Jah James, have created a visit summary document based on the audio recording between WOJCIECH Lacy and this patient for the physician to review, edit as needed, and authenticate.  Creation Date: 2/21/2023   I have reviewed and edited the visit summary above and attest that it is accurate.    Supervision Dr Albarran   4 = No assist / stand by assistance

## 2024-01-29 NOTE — PROGRESS NOTE ADULT - SUBJECTIVE AND OBJECTIVE BOX
Date of service: 01-29-24 @ 19:17      Patient is a 68y old  Male who presents with a chief complaint of NSTEMI (29 Jan 2024 10:23)                                                               INTERVAL HPI/OVERNIGHT EVENTS:    REVIEW OF SYSTEMS:     CONSTITUTIONAL: No weakness, fevers or chills  RESPIRATORY: No cough, wheezing,  No shortness of breath  CARDIOVASCULAR: chest pain overnight however improved wiht Nitro   No chest pain or palpitations  GASTROINTESTINAL: No abdominal pain  . No nausea, vomiting, or hematemesis; No diarrhea or constipation. No melena or hematochezia.  GENITOURINARY: No dysuria, frequency or hematuria  NEUROLOGICAL: No numbness or weakness                                                                                                                                                                                                                                                                                 Medications:  MEDICATIONS  (STANDING):  atorvastatin 80 milliGRAM(s) Oral at bedtime  dextrose 5%. 1000 milliLiter(s) (100 mL/Hr) IV Continuous <Continuous>  dextrose 5%. 1000 milliLiter(s) (50 mL/Hr) IV Continuous <Continuous>  dextrose 50% Injectable 25 Gram(s) IV Push once  dextrose 50% Injectable 12.5 Gram(s) IV Push once  dextrose 50% Injectable 25 Gram(s) IV Push once  furosemide    Tablet 40 milliGRAM(s) Oral daily  glucagon  Injectable 1 milliGRAM(s) IntraMuscular once  insulin glargine Injectable (LANTUS) 14 Unit(s) SubCutaneous at bedtime  insulin lispro (ADMELOG) corrective regimen sliding scale   SubCutaneous three times a day before meals  metoprolol tartrate 50 milliGRAM(s) Oral two times a day  multivitamin 1 Tablet(s) Oral daily  nitroglycerin     SubLingual 0.4 milliGRAM(s) SubLingual once  sacubitril 49 mG/valsartan 51 mG 1 Tablet(s) Oral two times a day  sodium chloride 0.9%. 1000 milliLiter(s) (100 mL/Hr) IV Continuous <Continuous>  ticagrelor 90 milliGRAM(s) Oral every 12 hours    MEDICATIONS  (PRN):  acetaminophen     Tablet .. 650 milliGRAM(s) Oral every 6 hours PRN Temp greater or equal to 38C (100.4F), Mild Pain (1 - 3)  dextrose Oral Gel 15 Gram(s) Oral once PRN Blood Glucose LESS THAN 70 milliGRAM(s)/deciliter  melatonin 3 milliGRAM(s) Oral at bedtime PRN Insomnia  nitroglycerin     SubLingual 0.4 milliGRAM(s) SubLingual every 5 minutes PRN Chest Pain       Allergies    No Known Allergies    Intolerances      Vital Signs Last 24 Hrs  T(C): 36.3 (29 Jan 2024 17:50), Max: 36.7 (29 Jan 2024 14:20)  T(F): 97.4 (29 Jan 2024 17:50), Max: 98 (29 Jan 2024 14:20)  HR: 93 (29 Jan 2024 17:50) (74 - 98)  BP: 114/57 (29 Jan 2024 17:50) (114/57 - 181/111)  BP(mean): 79 (29 Jan 2024 17:50) (79 - 125)  RR: 17 (29 Jan 2024 17:50) (15 - 18)  SpO2: 98% (29 Jan 2024 17:50) (96% - 100%)    Parameters below as of 29 Jan 2024 18:50  Patient On (Oxygen Delivery Method): room air      CAPILLARY BLOOD GLUCOSE      POCT Blood Glucose.: 107 mg/dL (29 Jan 2024 16:14)  POCT Blood Glucose.: 79 mg/dL (29 Jan 2024 12:28)  POCT Blood Glucose.: 102 mg/dL (29 Jan 2024 07:25)  POCT Blood Glucose.: 138 mg/dL (29 Jan 2024 00:50)      Physical Exam:    Daily     Daily   General:  Well appearing, NAD, not cachetic  HEENT:  Nonicteric, PERRLA  CV:  RRR, S1S2   Lungs:  CTA B/L, no wheezes, rales, rhonchi  Abdomen:  Soft, non-tender, no distended, positive BS  Extremities:  2+ pulses, no c/c, no edema  Skin:  Warm and dry, no rashes  :  No moreno  Neuro:  AAOx3, non-focal, grossly intact                                                                                                                                                                                                                                                                                                LABS:                               12.5   9.47  )-----------( 166      ( 29 Jan 2024 05:44 )             36.4                      01-29    140  |  107  |  37<H>  ----------------------------<  93  4.3   |  24  |  1.88<H>    Ca    9.3      29 Jan 2024 05:44    TPro  5.7<L>  /  Alb  3.8  /  TBili  2.1<H>  /  DBili  x   /  AST  76<H>  /  ALT  39  /  AlkPhos  82  01-29                       RADIOLOGY & ADDITIONAL TESTS         I personally reviewed: [  ]EKG   [  ]CXR    [  ] CT      A/P:         Discussed with :     Derek consultants' Notes   Time spent :

## 2024-01-29 NOTE — CONSULT NOTE ADULT - SUBJECTIVE AND OBJECTIVE BOX
Patient is a 68y old  Male who presents with a chief complaint of NSTEMI (2024 23:41)      HPI:  68M w/ Hx of CAD s/p 9 stents (most recent to pLM (70%), pLAD, mLAD on 24 with Dr. Dominguez), HFrEF (EF~50% as of 23), A-fib, HTN, HLD, T2DM, CKD3, former smoker, presents due to chest pain after recent stenting on 24. Patient was told to continue triple therapy with aspirin, plavix and full dose Xarelto for one week and then continue with just plavix and Xarelto afterwards and was compliant. Reports that for the last several days, he has been experiencing non-radiating, non-tender significant chest pain intermittently that worsens with activity or emotional excitation. Reports it feels similar to previous cardiac related chest pains he's had in the past. Spoke to his cardiologist on friday and got labs performed. Reports no other acute complaints and is well on exam currently. Reports he is unsure of last A1c and uses and variable amount of semglee every night, it is prescribed 28u per night but he will use as long as 12u a night if his glucose is on the lower end. Patient denies fever, chills,headache, dizziness, abd pain, nausea, vomiting, constipation, dysuria. (2024 23:41)      PAST MEDICAL & SURGICAL HISTORY:  Former smoker      CAD in native artery      Type 2 diabetes mellitus      Hyperlipidemia, unspecified hyperlipidemia type      Essential hypertension, hypertension with unspecified goal      Afib      Hematoma of leg      Stented coronary artery      CHF (congestive heart failure)      s/p Carotid Endarterectomy  left          MEDICATIONS  (STANDING):  atorvastatin 80 milliGRAM(s) Oral at bedtime  clopidogrel Tablet 75 milliGRAM(s) Oral daily  dextrose 5%. 1000 milliLiter(s) (100 mL/Hr) IV Continuous <Continuous>  dextrose 5%. 1000 milliLiter(s) (50 mL/Hr) IV Continuous <Continuous>  dextrose 50% Injectable 25 Gram(s) IV Push once  dextrose 50% Injectable 25 Gram(s) IV Push once  dextrose 50% Injectable 12.5 Gram(s) IV Push once  furosemide    Tablet 40 milliGRAM(s) Oral daily  glucagon  Injectable 1 milliGRAM(s) IntraMuscular once  heparin  Infusion.  Unit(s)/Hr (7.5 mL/Hr) IV Continuous <Continuous>  insulin glargine Injectable (LANTUS) 14 Unit(s) SubCutaneous at bedtime  insulin lispro (ADMELOG) corrective regimen sliding scale   SubCutaneous three times a day before meals  metoprolol tartrate 50 milliGRAM(s) Oral two times a day  multivitamin 1 Tablet(s) Oral daily  nitroglycerin     SubLingual 0.4 milliGRAM(s) SubLingual once  sacubitril 49 mG/valsartan 51 mG 1 Tablet(s) Oral two times a day      Allergies    No Known Allergies    Intolerances        SOCIAL HISTORY:  Denies ETOh,Smoking,     FAMILY HISTORY:  Family history of heart disease  father  age 71    FH: atrial fibrillation  sister        REVIEW OF SYSTEMS:  CONSTITUTIONAL: No weakness, fevers or chills  EYES/ENT: No visual changes;  No vertigo or throat pain   NECK: No pain or stiffness  RESPIRATORY: No cough, wheezing, hemoptysis; No shortness of breath  CARDIOVASCULAR: No chest pain or palpitations  GASTROINTESTINAL: No abdominal or epigastric pain. No nausea, vomiting, or hematemesis; No diarrhea or constipation. No melena or hematochezia.  GENITOURINARY: No dysuria, frequency or hematuria  NEUROLOGICAL: No numbness or weakness  SKIN: No itching, burning, rashes, or lesions   All other review of systems is negative unless indicated above.    VITAL:  T(C): , Max: 36.7 (24 @ 19:15)  T(F): , Max: 98 (24 @ 19:15)  HR: 78 (24 @ 06:05)  BP: 134/78 (24 @ 06:05)  BP(mean): 90 (24 @ 05:20)  RR: 16 (24 @ 06:05)  SpO2: 97% (24 @ 06:05)  Wt(kg): --    PHYSICAL EXAM:  Constitutional: NAD, Alert  HEENT: NCAT, MMM  Neck: Supple, No JVD  Respiratory: CTA-b/l  Cardiovascular: RRR s1s2, no m/r/g  Gastrointestinal: BS+, soft, NT/ND  Extremities: No peripheral edema b/l  Neurological: no focal deficits; strength grossly intact  Back: no CVAT b/l  Skin: No rashes, no nevi    LABS:                        12.5   9.47  )-----------( 166      ( 2024 05:44 )             36.4     Na(140)/K(4.3)/Cl(107)/HCO3(24)/BUN(37)/Cr(1.88)Glu(93)/Ca(9.3)/Mg(--)/PO4(--)     @ 05:44  Na(138)/K(4.7)/Cl(104)/HCO3(22)/BUN(39)/Cr(2.08)Glu(143)/Ca(9.8)/Mg(--)/PO4(--)     @ 00:56  Na(138)/K(4.9)/Cl(102)/HCO3(25)/BUN(39)/Cr(2.04)Glu(178)/Ca(9.7)/Mg(--)/PO4(--)     @ 19:20    Trop 319==>260      IMAGING:  < from: Xray Chest 1 View- PORTABLE-Urgent (Xray Chest 1 View- PORTABLE-Urgent .) (24 @ 19:53) >  Clear lungs.    < from: TTE Limited W or WO Ultrasound Enhancing Agent (23 @ 15:24) >  Left Ventricle:  The left ventricular cavity is small. Left ventricular systolic function is low normal with an ejection fraction visually estimated at 50%. There are no regional wall motion abnormalitiesseen. There is no evidence of a left ventricular thrombus.  Right Ventricle:  The right ventricular cavity is normal in size and mildly reduced systolic function. Tricuspid annular plane systolic excursion (TAPSE) is 0.7 cm (normal >=1.7 cm). Tricuspid annular tissue Doppler S' is 9.0 cm/s (normal >10 cm/s).  Left Atrium:  The left atrium is moderately dilated with an indexed volume of 20.30 ml/m².  Right Atrium:  The right atrium is normal in size with an indexed volume of 22.94 ml/m².  Aortic Valve:  There is fibrocalcific aortic valve sclerosis without stenosis.  Mitral Valve:  Structurally normal mitral valve with normal leaflet excursion.  Tricuspid Valve:  Structurally normal tricuspid valve with normal leaflet excursion. There is trace tricuspid regurgitation.  Pulmonic Valve:  Structurally normal pulmonic valve with normal leaflet excursion. There is trace pulmonic regurgitation.  Aorta:  The aortic annulus and aortic root appear normal in size.  Pericardium:  No pericardial effusion seen.  Systemic Veins:  The inferior vena cava is normal in size measuring 1.40 cm in diameter, (normal <2.1cm) with normal inspiratory collapse (normal >50%) consistent with normal right atrial pressure (~3, range 0-5mmHg).      ASSESSMENT:  (1)Renal - CKD3-4 - due to DM/HTN - at/near baseline  (2)Cardiac - acute NSTEMI - on heparin gtt    RECOMMEND:  (1)Heparin gtt/Plavix as ordered  (2)Entresto/Lasix as ordered  (3)F/U Cardiology input  (4)No renal objection to cardiac cath today; can give NS 100cc/h x 5h periprocedurally to minimize DENNIS risk  (5)BMP daily  (6)Dose new meds for GFR 25-30ml/min    Thank you for involving [facility] in this patient's care.    With warm regards,    Markell Walton MD   [facility]  Office: [phone]  Cell: [phone]               HPI: Mr. Velázquez is a 68 year-old man with history of multiple medical issues including hypertension, type 2 diabetes mellitus, atrial fibrillation, coronary artery disease, and stage 3-4 chronic kidney disease. He is well-known to me from the inpatient and outpatient setting. He is s/p admission at [facility] 1/5-1/9/24 for acute NSTEMI; he underwent PCI with placement of stents to the left main and left anterior descending arteries on 1/8/24 with Dr. Julito Dominguez. I saw him at my office on Friday 1/26/24; at that time he admitted to intermittent substernal chest pain over the past few days. I attempted to persuade him to go to the ER but he refused. I therefore had a CPK level sent off; I saw yesterday that it had returned elevated at 757. I therefore called him yesterday, at which point I was able to convince him and his family to have him go to the ER.      PAST MEDICAL & SURGICAL HISTORY:  HTN  HLD  DM2  CAD - 9 stents  AFib  HFrEF (50%)  Carotid stenosis - L CEA  CKD 3-4    Allergies  No Known Allergies    SOCIAL HISTORY:  (+)former smoker    FAMILY HISTORY:  Family history of heart disease - father  FH: atrial fibrillation - sister    REVIEW OF SYSTEMS:  CONSTITUTIONAL: No weakness, fevers or chills  EYES/ENT: No visual changes;  No vertigo or throat pain   NECK: No pain or stiffness  RESPIRATORY: No cough, wheezing, hemoptysis; No shortness of breath  CARDIOVASCULAR: (+)chest pain  GASTROINTESTINAL: No abdominal or epigastric pain. No nausea, vomiting, or hematemesis; No diarrhea or constipation. No melena or hematochezia.  GENITOURINARY: No dysuria, frequency or hematuria  NEUROLOGICAL: No numbness or weakness  SKIN: No itching, burning, rashes, or lesions   All other review of systems is negative unless indicated above.    VITAL:  T(C): , Max: 36.7 (01-28-24 @ 19:15)  T(F): , Max: 98 (01-28-24 @ 19:15)  HR: 78 (01-29-24 @ 06:05)  BP: 134/78 (01-29-24 @ 06:05)  BP(mean): 90 (01-29-24 @ 05:20)  RR: 16 (01-29-24 @ 06:05)  SpO2: 97% (01-29-24 @ 06:05)    PHYSICAL EXAM:  Constitutional: NAD, Alert  HEENT: NCAT, MMM  Neck: Supple, No JVD  Respiratory: CTA-b/l  Cardiovascular: RRR s1s2, no m/r/g  Gastrointestinal: BS+, soft, NT/ND  Extremities: No peripheral edema b/l  Neurological: no focal deficits; strength grossly intact  Back: no CVAT b/l  Skin: No rashes, no nevi    LABS:                        12.5   9.47  )-----------( 166      ( 29 Jan 2024 05:44 )             36.4     Na(140)/K(4.3)/Cl(107)/HCO3(24)/BUN(37)/Cr(1.88)Glu(93)/Ca(9.3)/Mg(--)/PO4(--)    01-29 @ 05:44  Na(138)/K(4.7)/Cl(104)/HCO3(22)/BUN(39)/Cr(2.08)Glu(143)/Ca(9.8)/Mg(--)/PO4(--)    01-29 @ 00:56  Na(138)/K(4.9)/Cl(102)/HCO3(25)/BUN(39)/Cr(2.04)Glu(178)/Ca(9.7)/Mg(--)/PO4(--)    01-28 @ 19:20    Trop 319==>260      IMAGING:  < from: Xray Chest 1 View- PORTABLE-Urgent (Xray Chest 1 View- PORTABLE-Urgent .) (01.28.24 @ 19:53) >  Clear lungs.    < from: TTE Limited W or WO Ultrasound Enhancing Agent (11.26.23 @ 15:24) >  Left Ventricle:  The left ventricular cavity is small. Left ventricular systolic function is low normal with an ejection fraction visually estimated at 50%. There are no regional wall motion abnormalitiesseen. There is no evidence of a left ventricular thrombus.  Right Ventricle:  The right ventricular cavity is normal in size and mildly reduced systolic function. Tricuspid annular plane systolic excursion (TAPSE) is 0.7 cm (normal >=1.7 cm). Tricuspid annular tissue Doppler S' is 9.0 cm/s (normal >10 cm/s).  Left Atrium:  The left atrium is moderately dilated with an indexed volume of 20.30 ml/m².  Right Atrium:  The right atrium is normal in size with an indexed volume of 22.94 ml/m².  Aortic Valve:  There is fibrocalcific aortic valve sclerosis without stenosis.  Mitral Valve:  Structurally normal mitral valve with normal leaflet excursion.  Tricuspid Valve:  Structurally normal tricuspid valve with normal leaflet excursion. There is trace tricuspid regurgitation.  Pulmonic Valve:  Structurally normal pulmonic valve with normal leaflet excursion. There is trace pulmonic regurgitation.  Aorta:  The aortic annulus and aortic root appear normal in size.  Pericardium:  No pericardial effusion seen.  Systemic Veins:  The inferior vena cava is normal in size measuring 1.40 cm in diameter, (normal <2.1cm) with normal inspiratory collapse (normal >50%) consistent with normal right atrial pressure (~3, range 0-5mmHg).      ASSESSMENT:  (1)Renal - CKD3-4 - due to DM/HTN - at/near baseline  (2)Cardiac - acute NSTEMI - on heparin gtt    RECOMMEND:  (1)Heparin gtt/Plavix as ordered  (2)Entresto/Lasix as ordered  (3)F/U Cardiology input  (4)No renal objection to cardiac cath today; can give NS 100cc/h x 5h periprocedurally to minimize DENNIS risk  (5)BMP daily  (6)Dose new meds for GFR 25-30ml/min    Thank you for involving [facility] in this patient's care.    With warm regards,    Markell Walton MD   [facility]  Office: [phone]  Cell: [phone]

## 2024-01-29 NOTE — DISCHARGE NOTE PROVIDER - PROVIDER TOKENS
PROVIDER:[TOKEN:[[id number]],FOLLOWUP:[2 weeks],ESTABLISHEDPATIENT:[T]] PROVIDER:[TOKEN:[[id number]],FOLLOWUP:[2 weeks],ESTABLISHEDPATIENT:[T]] PROVIDER:[TOKEN:[[id number]],FOLLOWUP:[1-3 days],ESTABLISHEDPATIENT:[T]]

## 2024-01-29 NOTE — ED ADULT NURSE REASSESSMENT NOTE - NS ED NURSE REASSESS COMMENT FT1
Patient a/ox4, lying in the stretcher. Patient endorses resolution of chest pain. MD Keys at bedside. Patient on CM, A-fib. Safety and comfort provided.

## 2024-01-29 NOTE — ED ADULT NURSE REASSESSMENT NOTE - NS ED NURSE REASSESS COMMENT FT1
Patient received sitting in stretcher, aaox4 ambulatory admitted to telemetry, afib on monitor, admitted for chest pain pending bed availability. Patient on Heparin drip infusing at 5.5ml/hr, next ApTt is at 10am this morninb. patient denies any chest pain at the moment. patient is continent of urine and bladder.   Report hand off to RUSSELL Galindo of [facility], pending Echo.

## 2024-01-29 NOTE — ED ADULT NURSE REASSESSMENT NOTE - NS ED NURSE REASSESS COMMENT FT1
MD Keys at bedside assessing patient. As per MD Keys, cardiology has been consulted. MD Keys at bedside assessing patient. MD Keys given new EKG, As per MD Keys, cardiology has been consulted.

## 2024-01-30 ENCOUNTER — TRANSCRIPTION ENCOUNTER (OUTPATIENT)
Age: 69
End: 2024-01-30

## 2024-01-30 VITALS
HEART RATE: 87 BPM | DIASTOLIC BLOOD PRESSURE: 57 MMHG | TEMPERATURE: 97 F | SYSTOLIC BLOOD PRESSURE: 100 MMHG | RESPIRATION RATE: 18 BRPM | OXYGEN SATURATION: 96 %

## 2024-01-30 LAB
ANION GAP SERPL CALC-SCNC: 13 MMOL/L — SIGNIFICANT CHANGE UP (ref 5–17)
APTT BLD: 38.9 SEC — HIGH (ref 24.5–35.6)
BUN SERPL-MCNC: 41 MG/DL — HIGH (ref 7–23)
CALCIUM SERPL-MCNC: 10.1 MG/DL — SIGNIFICANT CHANGE UP (ref 8.4–10.5)
CHLORIDE SERPL-SCNC: 104 MMOL/L — SIGNIFICANT CHANGE UP (ref 96–108)
CO2 SERPL-SCNC: 26 MMOL/L — SIGNIFICANT CHANGE UP (ref 22–31)
CREAT SERPL-MCNC: 2.13 MG/DL — HIGH (ref 0.5–1.3)
EGFR: 34 ML/MIN/1.73M2 — LOW
GLUCOSE BLDC GLUCOMTR-MCNC: 213 MG/DL — HIGH (ref 70–99)
GLUCOSE BLDC GLUCOMTR-MCNC: 89 MG/DL — SIGNIFICANT CHANGE UP (ref 70–99)
GLUCOSE SERPL-MCNC: 146 MG/DL — HIGH (ref 70–99)
HCT VFR BLD CALC: 38.9 % — LOW (ref 39–50)
HGB BLD-MCNC: 13.2 G/DL — SIGNIFICANT CHANGE UP (ref 13–17)
INR BLD: 1.61 RATIO — HIGH (ref 0.85–1.18)
MAGNESIUM SERPL-MCNC: 2.6 MG/DL — SIGNIFICANT CHANGE UP (ref 1.6–2.6)
MCHC RBC-ENTMCNC: 31.4 PG — SIGNIFICANT CHANGE UP (ref 27–34)
MCHC RBC-ENTMCNC: 33.9 GM/DL — SIGNIFICANT CHANGE UP (ref 32–36)
MCV RBC AUTO: 92.4 FL — SIGNIFICANT CHANGE UP (ref 80–100)
NRBC # BLD: 0 /100 WBCS — SIGNIFICANT CHANGE UP (ref 0–0)
PLATELET # BLD AUTO: 190 K/UL — SIGNIFICANT CHANGE UP (ref 150–400)
POTASSIUM SERPL-MCNC: 4.6 MMOL/L — SIGNIFICANT CHANGE UP (ref 3.5–5.3)
POTASSIUM SERPL-SCNC: 4.6 MMOL/L — SIGNIFICANT CHANGE UP (ref 3.5–5.3)
PROTHROM AB SERPL-ACNC: 17.5 SEC — HIGH (ref 9.5–13)
RBC # BLD: 4.21 M/UL — SIGNIFICANT CHANGE UP (ref 4.2–5.8)
RBC # FLD: 13.5 % — SIGNIFICANT CHANGE UP (ref 10.3–14.5)
SODIUM SERPL-SCNC: 143 MMOL/L — SIGNIFICANT CHANGE UP (ref 135–145)
WBC # BLD: 10.94 K/UL — HIGH (ref 3.8–10.5)
WBC # FLD AUTO: 10.94 K/UL — HIGH (ref 3.8–10.5)

## 2024-01-30 PROCEDURE — 85025 COMPLETE CBC W/AUTO DIFF WBC: CPT

## 2024-01-30 PROCEDURE — 85610 PROTHROMBIN TIME: CPT

## 2024-01-30 PROCEDURE — 71045 X-RAY EXAM CHEST 1 VIEW: CPT

## 2024-01-30 PROCEDURE — C1894: CPT

## 2024-01-30 PROCEDURE — 83735 ASSAY OF MAGNESIUM: CPT

## 2024-01-30 PROCEDURE — 83605 ASSAY OF LACTIC ACID: CPT

## 2024-01-30 PROCEDURE — 36415 COLL VENOUS BLD VENIPUNCTURE: CPT

## 2024-01-30 PROCEDURE — 84484 ASSAY OF TROPONIN QUANT: CPT

## 2024-01-30 PROCEDURE — 80053 COMPREHEN METABOLIC PANEL: CPT

## 2024-01-30 PROCEDURE — 82962 GLUCOSE BLOOD TEST: CPT

## 2024-01-30 PROCEDURE — 83036 HEMOGLOBIN GLYCOSYLATED A1C: CPT

## 2024-01-30 PROCEDURE — C1887: CPT

## 2024-01-30 PROCEDURE — 85027 COMPLETE CBC AUTOMATED: CPT

## 2024-01-30 PROCEDURE — 82553 CREATINE MB FRACTION: CPT

## 2024-01-30 PROCEDURE — 93454 CORONARY ARTERY ANGIO S&I: CPT

## 2024-01-30 PROCEDURE — 80061 LIPID PANEL: CPT

## 2024-01-30 PROCEDURE — 93005 ELECTROCARDIOGRAM TRACING: CPT

## 2024-01-30 PROCEDURE — 99291 CRITICAL CARE FIRST HOUR: CPT

## 2024-01-30 PROCEDURE — C1769: CPT

## 2024-01-30 PROCEDURE — 96374 THER/PROPH/DIAG INJ IV PUSH: CPT

## 2024-01-30 PROCEDURE — 93306 TTE W/DOPPLER COMPLETE: CPT

## 2024-01-30 PROCEDURE — 85730 THROMBOPLASTIN TIME PARTIAL: CPT

## 2024-01-30 PROCEDURE — 82550 ASSAY OF CK (CPK): CPT

## 2024-01-30 PROCEDURE — 80048 BASIC METABOLIC PNL TOTAL CA: CPT

## 2024-01-30 RX ORDER — RIVAROXABAN 15 MG-20MG
1 KIT ORAL
Qty: 30 | Refills: 0
Start: 2024-01-30 | End: 2024-02-28

## 2024-01-30 RX ORDER — ISOSORBIDE MONONITRATE 60 MG/1
30 TABLET, EXTENDED RELEASE ORAL DAILY
Refills: 0 | Status: DISCONTINUED | OUTPATIENT
Start: 2024-01-30 | End: 2024-01-30

## 2024-01-30 RX ORDER — METOPROLOL TARTRATE 50 MG
1 TABLET ORAL
Refills: 0 | DISCHARGE

## 2024-01-30 RX ORDER — NITROGLYCERIN 6.5 MG
1 CAPSULE, EXTENDED RELEASE ORAL
Qty: 0 | Refills: 0 | DISCHARGE

## 2024-01-30 RX ORDER — METOPROLOL TARTRATE 50 MG
25 TABLET ORAL
Refills: 0 | Status: DISCONTINUED | OUTPATIENT
Start: 2024-01-30 | End: 2024-01-30

## 2024-01-30 RX ORDER — METOPROLOL TARTRATE 50 MG
1 TABLET ORAL
Qty: 60 | Refills: 0
Start: 2024-01-30 | End: 2024-02-28

## 2024-01-30 RX ADMIN — Medication 50 MILLIGRAM(S): at 05:33

## 2024-01-30 RX ADMIN — Medication 4: at 12:36

## 2024-01-30 RX ADMIN — Medication 1 TABLET(S): at 08:46

## 2024-01-30 RX ADMIN — Medication 40 MILLIGRAM(S): at 05:34

## 2024-01-30 RX ADMIN — CLOPIDOGREL BISULFATE 75 MILLIGRAM(S): 75 TABLET, FILM COATED ORAL at 08:46

## 2024-01-30 RX ADMIN — SACUBITRIL AND VALSARTAN 1 TABLET(S): 24; 26 TABLET, FILM COATED ORAL at 05:33

## 2024-01-30 RX ADMIN — ISOSORBIDE MONONITRATE 30 MILLIGRAM(S): 60 TABLET, EXTENDED RELEASE ORAL at 08:46

## 2024-01-30 NOTE — PROGRESS NOTE ADULT - ASSESSMENT
#ASHD s/p multiple pci: yesterday : S/p PCI - TITO to pLAD x1, mLAD x1, pLM x1 by Dr Dominguez. +CE c/w NSTEMI. Cath result discussed with Dr Lam: no change anatomy, poor distal target LAD, imdur being added.   # HFrEF (EF~50%, 11/26/23)  #A-fib on Xarelto  #HTN  #HL  #T2DM  #CKD   For tentative discharge if remains stable on imdur.   
68 M pmh pafib, CAD multiple PCIs w/ recent TITO to ostial LM ( 6/2/23), CKD3, IDDM2, CHF,  p/w CP being adm for NSTEMI
68 M pmh pafib, CAD multiple PCIs w/ recent TITO to ostial LM ( 6/2/23), CKD3, IDDM2, CHF,  p/w CP being adm for NSTEMI

## 2024-01-30 NOTE — PROGRESS NOTE ADULT - SUBJECTIVE AND OBJECTIVE BOX
Date of service: 01-30-24 @ 11:15      Patient is a 68y old  Male who presents with a chief complaint of NSTEMI (30 Jan 2024 08:09)                                                               INTERVAL HPI/OVERNIGHT EVENTS:    REVIEW OF SYSTEMS:     CONSTITUTIONAL: No weakness, fevers or chills  EYES/ENT: No visual changes , no ear ache   NECK: No pain or stiffness  RESPIRATORY: No cough, wheezing,  No shortness of breath  CARDIOVASCULAR: No chest pain or palpitations  GASTROINTESTINAL: No abdominal pain  . No nausea, vomiting, or hematemesis; No diarrhea or constipation. No melena or hematochezia.  GENITOURINARY: No dysuria, frequency or hematuria  NEUROLOGICAL: No numbness or weakness  SKIN: No itching, burning, rashes, or lesions                                                                                                                                                                                                                                                                                 Medications:  MEDICATIONS  (STANDING):  atorvastatin 80 milliGRAM(s) Oral at bedtime  clopidogrel Tablet 75 milliGRAM(s) Oral daily  dextrose 5%. 1000 milliLiter(s) (100 mL/Hr) IV Continuous <Continuous>  dextrose 5%. 1000 milliLiter(s) (50 mL/Hr) IV Continuous <Continuous>  dextrose 50% Injectable 25 Gram(s) IV Push once  dextrose 50% Injectable 25 Gram(s) IV Push once  dextrose 50% Injectable 12.5 Gram(s) IV Push once  furosemide    Tablet 40 milliGRAM(s) Oral daily  glucagon  Injectable 1 milliGRAM(s) IntraMuscular once  insulin glargine Injectable (LANTUS) 14 Unit(s) SubCutaneous at bedtime  insulin lispro (ADMELOG) corrective regimen sliding scale   SubCutaneous three times a day before meals  isosorbide   mononitrate ER Tablet (IMDUR) 30 milliGRAM(s) Oral daily  metoprolol tartrate 50 milliGRAM(s) Oral two times a day  multivitamin 1 Tablet(s) Oral daily  nitroglycerin     SubLingual 0.4 milliGRAM(s) SubLingual once  sacubitril 49 mG/valsartan 51 mG 1 Tablet(s) Oral two times a day    MEDICATIONS  (PRN):  acetaminophen     Tablet .. 650 milliGRAM(s) Oral every 6 hours PRN Temp greater or equal to 38C (100.4F), Mild Pain (1 - 3)  dextrose Oral Gel 15 Gram(s) Oral once PRN Blood Glucose LESS THAN 70 milliGRAM(s)/deciliter  melatonin 3 milliGRAM(s) Oral at bedtime PRN Insomnia  nitroglycerin     SubLingual 0.4 milliGRAM(s) SubLingual every 5 minutes PRN Chest Pain       Allergies    No Known Allergies    Intolerances      Vital Signs Last 24 Hrs  T(C): 36.4 (30 Jan 2024 08:05), Max: 36.7 (29 Jan 2024 14:20)  T(F): 97.5 (30 Jan 2024 08:05), Max: 98 (29 Jan 2024 14:20)  HR: 83 (30 Jan 2024 10:52) (73 - 98)  BP: 102/57 (30 Jan 2024 10:52) (102/57 - 169/81)  BP(mean): 72 (30 Jan 2024 10:52) (72 - 114)  RR: 18 (30 Jan 2024 08:05) (15 - 18)  SpO2: 100% (30 Jan 2024 08:05) (96% - 100%)    Parameters below as of 30 Jan 2024 08:05  Patient On (Oxygen Delivery Method): room air      CAPILLARY BLOOD GLUCOSE      POCT Blood Glucose.: 89 mg/dL (30 Jan 2024 07:18)  POCT Blood Glucose.: 166 mg/dL (29 Jan 2024 21:13)  POCT Blood Glucose.: 107 mg/dL (29 Jan 2024 16:14)  POCT Blood Glucose.: 79 mg/dL (29 Jan 2024 12:28)      01-29 @ 07:01 - 01-30 @ 07:00  --------------------------------------------------------  IN: 600 mL / OUT: 0 mL / NET: 600 mL    01-30 @ 07:01 - 01-30 @ 11:15  --------------------------------------------------------  IN: 240 mL / OUT: 0 mL / NET: 240 mL      Physical Exam:    Daily     Daily   General:  Well appearing, NAD, not cachetic  HEENT:  Nonicteric, PERRLA  CV:  RRR, S1S2   Lungs:  CTA B/L, no wheezes, rales, rhonchi  Abdomen:  Soft, non-tender, no distended, positive BS  Extremities:  2+ pulses, no c/c, no edema  Skin:  Warm and dry, no rashes  :  No moreno  Neuro:  AAOx3, non-focal, grossly intact                                                                                                                                                                                                                                                                                                LABS:                               13.2   10.94 )-----------( 190      ( 30 Jan 2024 01:18 )             38.9                      01-30    143  |  104  |  41<H>  ----------------------------<  146<H>  4.6   |  26  |  2.13<H>    Ca    10.1      30 Jan 2024 01:18  Mg     2.6     01-30    TPro  5.7<L>  /  Alb  3.8  /  TBili  2.1<H>  /  DBili  x   /  AST  76<H>  /  ALT  39  /  AlkPhos  82  01-29                       RADIOLOGY & ADDITIONAL TESTS         I personally reviewed: [  ]EKG   [  ]CXR    [  ] CT      A/P:         Discussed with :     Derek consultants' Notes   Time spent :

## 2024-01-30 NOTE — PROGRESS NOTE ADULT - SUBJECTIVE AND OBJECTIVE BOX
SUBJECTIVE: Asymptomatic. +CE c/w NSTEMI. Cath result discussed with Dr Lam: no change anatomy, poor distal target LAD, imdur being added.   	  MEDICATIONS:  furosemide    Tablet 40 milliGRAM(s) Oral daily  isosorbide   mononitrate ER Tablet (IMDUR) 30 milliGRAM(s) Oral daily  metoprolol tartrate 50 milliGRAM(s) Oral two times a day  nitroglycerin     SubLingual 0.4 milliGRAM(s) SubLingual once  nitroglycerin     SubLingual 0.4 milliGRAM(s) SubLingual every 5 minutes PRN  sacubitril 49 mG/valsartan 51 mG 1 Tablet(s) Oral two times a day  acetaminophen     Tablet .. 650 milliGRAM(s) Oral every 6 hours PRN  melatonin 3 milliGRAM(s) Oral at bedtime PRN  atorvastatin 80 milliGRAM(s) Oral at bedtime  dextrose 50% Injectable 25 Gram(s) IV Push once  dextrose 50% Injectable 25 Gram(s) IV Push once  dextrose 50% Injectable 12.5 Gram(s) IV Push once  dextrose Oral Gel 15 Gram(s) Oral once PRN  glucagon  Injectable 1 milliGRAM(s) IntraMuscular once  insulin glargine Injectable (LANTUS) 14 Unit(s) SubCutaneous at bedtime  insulin lispro (ADMELOG) corrective regimen sliding scale   SubCutaneous three times a day before meals  clopidogrel Tablet 75 milliGRAM(s) Oral daily  dextrose 5%. 1000 milliLiter(s) IV Continuous <Continuous>  dextrose 5%. 1000 milliLiter(s) IV Continuous <Continuous>  multivitamin 1 Tablet(s) Oral daily      REVIEW OF SYSTEMS:    CONSTITUTIONAL: No fever, weight loss, or fatigue  EYES: No eye pain, visual disturbances, or discharge  NECK: No pain or stiffness  RESPIRATORY: No cough, wheezing, chills or hemoptysis; No Shortness of Breath  CARDIOVASCULAR: No chest pain, palpitations, dizziness, or leg swelling  GASTROINTESTINAL: No abdominal or epigastric pain. No nausea, vomiting, or hematemesis; No diarrhea or constipation. No melena or hematochezia.  GENITOURINARY: No dysuria, frequency, hematuria, or incontinence  NEUROLOGICAL: No headaches, memory loss, loss of strength, numbness, or tremors  SKIN: No itching, burning, rashes, or lesions   LYMPH Nodes: No enlarged glands  MUSCULOSKELETAL: No joint pain or swelling; No muscle, back, or extremity pain  All other review of systems are negative.  	    PHYSICAL EXAM:  T(C): 36.4 (01-30-24 @ 08:05), Max: 36.7 (01-29-24 @ 14:20)  HR: 83 (01-30-24 @ 10:52) (73 - 98)  BP: 102/57 (01-30-24 @ 10:52) (102/57 - 169/81)  RR: 18 (01-30-24 @ 08:05) (15 - 18)  SpO2: 100% (01-30-24 @ 08:05) (96% - 100%)  Wt(kg): --  I&O's Summary    29 Jan 2024 07:01  -  30 Jan 2024 07:00  --------------------------------------------------------  IN: 600 mL / OUT: 0 mL / NET: 600 mL    30 Jan 2024 07:01  -  30 Jan 2024 10:58  --------------------------------------------------------  IN: 240 mL / OUT: 0 mL / NET: 240 mL          PHYSICAL EXAM    Appearance: Normal	  HEENT:   Normal oral mucosa, PERRL, EOMI	  NECK: Soft and supple, No LAD, No JVD  Cardiovascular: Regular Rate and Rhythm, Normal S1 S2, No murmurs, No clicks, gallops or rubs  Respiratory: Lungs clear to auscultation	  Extremities: No clubbing, cyanosis or edema    LABS:	 	               13.2   10.94 )-----------( 190      ( 30 Jan 2024 01:18 )             38.9     01-30    143  |  104  |  41<H>  ----------------------------<  146<H>  4.6   |  26  |  2.13<H>    Ca    10.1      30 Jan 2024 01:18  Mg     2.6     01-30    TPro  5.7<L>  /  Alb  3.8  /  TBili  2.1<H>  /  DBili  x   /  AST  76<H>  /  ALT  39  /  AlkPhos  82  01-29

## 2024-01-30 NOTE — DISCHARGE NOTE NURSING/CASE MANAGEMENT/SOCIAL WORK - PATIENT PORTAL LINK FT
You can access the FollowMyHealth Patient Portal offered by [facility] by registering at the following website: [email or web address]. By joining [email or web address]’s FollowMyHealth portal, you will also be able to view your health information using other applications (apps) compatible with our system.

## 2024-01-30 NOTE — PROGRESS NOTE ADULT - SUBJECTIVE AND OBJECTIVE BOX
Overnight events noted      VITAL:  T(C): , Max: 36.7 (01-29-24 @ 14:20)  T(F): , Max: 98 (01-29-24 @ 14:20)  HR: 73 (01-30-24 @ 08:05)  BP: 140/61 (01-30-24 @ 08:05)  BP(mean): 88 (01-30-24 @ 08:05)  RR: 18 (01-30-24 @ 08:05)  SpO2: 100% (01-30-24 @ 08:05)  Wt(kg): --      PHYSICAL EXAM:  Constitutional: NAD, Alert  HEENT: NCAT, MMM  Neck: Supple, No JVD  Respiratory: CTA-b/l  Cardiovascular: RRR s1s2, no m/r/g  Gastrointestinal: BS+, soft, NT/ND  Extremities: No peripheral edema b/l  Neurological: no focal deficits; strength grossly intact  Back: no CVAT b/l  Skin: No rashes, no nevi    LABS:                        13.2   10.94 )-----------( 190      ( 30 Jan 2024 01:18 )             38.9     Na(143)/K(4.6)/Cl(104)/HCO3(26)/BUN(41)/Cr(2.13)Glu(146)/Ca(10.1)/Mg(2.6)/PO4(--)    01-30 @ 01:18  Na(140)/K(4.3)/Cl(107)/HCO3(24)/BUN(37)/Cr(1.88)Glu(93)/Ca(9.3)/Mg(--)/PO4(--)    01-29 @ 05:44  Na(138)/K(4.7)/Cl(104)/HCO3(22)/BUN(39)/Cr(2.08)Glu(143)/Ca(9.8)/Mg(--)/PO4(--)    01-29 @ 00:56  Na(138)/K(4.9)/Cl(102)/HCO3(25)/BUN(39)/Cr(2.04)Glu(178)/Ca(9.7)/Mg(--)/PO4(--)    01-28 @ 19:20        IMPRESSION: 68M w/ HTN, DM2, AFib, CAD, and CKD3-4, s/p high-risk PCI 1/8/24; 1/28/24 p/w intermittent chest pain/NSTEMI    (1)Renal - CKD3-4 - due to DM/HTN - no evidence to date of DENNIS s/p cath yesterday  (2)Cardiac - acute chest pain/NSTEMI - s/p diagnostic cath yesterday - patent stents/no new blockages    RECOMMEND:  (1)Entresto/Lasix as ordered  (2)Xarelto per Cardiology  (3)Dose new meds for GFR 25-30ml/min  (4)No objection to discharge from renal standpoint;      - would repeat BMP later this week to confirm stability of renal function s/p cath      - could f/u at my office in 1-2 months        Markell Walton MD  [facility]  Office/on call physician: [phone]  Cell (7a-7p): [phone]       no pain/no sob at present  voiding well      VITAL:  T(C): , Max: 36.7 (01-29-24 @ 14:20)  T(F): , Max: 98 (01-29-24 @ 14:20)  HR: 73 (01-30-24 @ 08:05)  BP: 140/61 (01-30-24 @ 08:05)  BP(mean): 88 (01-30-24 @ 08:05)  RR: 18 (01-30-24 @ 08:05)  SpO2: 100% (01-30-24 @ 08:05)  Wt(kg): --      PHYSICAL EXAM:  Constitutional: NAD, Alert  HEENT: NCAT, MMM  Neck: Supple, No JVD  Respiratory: CTA-b/l  Cardiovascular: RRR s1s2, no m/r/g  Gastrointestinal: BS+, soft, NT/ND  Extremities: No peripheral edema b/l  Neurological: no focal deficits; strength grossly intact  Back: no CVAT b/l  Skin: No rashes, no nevi    LABS:                        13.2   10.94 )-----------( 190      ( 30 Jan 2024 01:18 )             38.9     Na(143)/K(4.6)/Cl(104)/HCO3(26)/BUN(41)/Cr(2.13)Glu(146)/Ca(10.1)/Mg(2.6)/PO4(--)    01-30 @ 01:18  Na(140)/K(4.3)/Cl(107)/HCO3(24)/BUN(37)/Cr(1.88)Glu(93)/Ca(9.3)/Mg(--)/PO4(--)    01-29 @ 05:44  Na(138)/K(4.7)/Cl(104)/HCO3(22)/BUN(39)/Cr(2.08)Glu(143)/Ca(9.8)/Mg(--)/PO4(--)    01-29 @ 00:56  Na(138)/K(4.9)/Cl(102)/HCO3(25)/BUN(39)/Cr(2.04)Glu(178)/Ca(9.7)/Mg(--)/PO4(--)    01-28 @ 19:20        IMPRESSION: 68M w/ HTN, DM2, AFib, CAD, and CKD3-4, s/p high-risk PCI 1/8/24; 1/28/24 p/w intermittent chest pain/NSTEMI    (1)Renal - CKD3-4 - due to DM/HTN - no evidence to date of DENNIS s/p cath yesterday  (2)Cardiac - acute chest pain/NSTEMI - s/p diagnostic cath yesterday - patent stents/no new blockages    RECOMMEND:  (1)Entresto/Lasix as ordered  (2)Xarelto per Cardiology  (3)Dose new meds for GFR 25-30ml/min  (4)No objection to discharge from renal standpoint;      - would repeat BMP later this week to confirm stability of renal function s/p cath      - could f/u at my office in 2-3 months as previously scheduled        Markell Walton MD  [facility]  Office/on call physician: [phone]  Cell (7a-7p): [phone]

## 2024-01-30 NOTE — PROGRESS NOTE ADULT - PROBLEM SELECTOR PLAN 1
cath :  patent stenting   NSTEMI   trop tending down   cont meds  cont medical management .. add imdur and monitor . if tolerates : then dc home to fu with pmd and cardio
cath   NSTEMI   trop tending down   cont meds   discussed with cardio : LM bifurication has been difficult to deal with   LM , LAD and CX has been a work in progress per Dr. Dominguez ,,,    no strightforward restenosis  but stent edge lesions

## 2024-01-30 NOTE — CHART NOTE - NSCHARTNOTEFT_GEN_A_CORE
s/p LHC - patent stents, cath unchanged from last procedure   Patient reports he takes NTG SL daily at home for anginal symptoms  Discussed with Dr Lopez. Will start Imdur 30mg po daily  PT seen by medicine Dr Mcdaniel and Dr Bert Walton cardiology today. Agree to plan.   OK to dc home if pain free and BP tolerates imdur    /58 post Imdur. Pt is sitting in bed, no CP or SOB  No dizziness reported s/p LHC - patent stents, cath unchanged from last procedure   Patient reports he takes NTG SL daily at home for anginal symptoms  Discussed with Dr Lopez. Will start Imdur 30mg po daily  Patient seen by medicine Dr Mcdaniel and Dr Bert Walton cardiology today. Agree to plan.   OK to dc home if pain free and BP tolerates imdur    /58 post Imdur. Pt is sitting in bed, no CP or SOB  No dizziness reported s/p LHC - patent stents, cath unchanged from last procedure   Patient reports he takes NTG SL daily at home for anginal symptoms  Discussed with Dr Lopez. Will start Imdur 30mg po daily  Patient seen by medicine Dr Mcdaniel and Dr Bert Walton cardiology today. Agree to plan.   OK to dc home if pain free and BP tolerates imdur    /58-98/55  post Imdur. Pt is sitting in bed, no CP or SOB  No dizziness reported. Ambulating without complaint  Discussed with Dr Mcdaniel. Will decrease metoprolol tartrate to 25mg BID on discharge and continue Imdur.  Pt has follow up apt in 48 hours with Dr Niecy wall for post LHC check up and BMP to check renal fx       OK to discharge per Dr Mcdaniel s/p LHC - patent stents, cath unchanged from last procedure   Patient reports he takes NTG SL daily at home for anginal symptoms  Discussed with Dr Lopez. Will start Imdur 30mg po daily  Patient seen by medicine Dr Mcdaniel and Dr Bert Walton cardiology today. Agree to plan.   OK to dc home if pain free and BP tolerates imdur    /58-98/55  post Imdur. Pt is sitting in bed, no CP or SOB  No dizziness reported. Ambulating without complaint  Discussed with Dr Mcdaniel. Will decrease metoprolol tartrate to 25mg BID on discharge and continue Imdur.  Pt has follow up apt in 48 hours with Dr Pemberton cards for post LHC check up and BMP to check renal fx       OK to discharge per Dr Mcdaniel    Update 14:22 TTE results reviewed with Dr Walton. No medication changes needed. DC home. Follow up w Dr Colorado as scheduled

## 2024-01-30 NOTE — DISCHARGE NOTE NURSING/CASE MANAGEMENT/SOCIAL WORK - NSDCFUADDAPPT_GEN_ALL_CORE_FT
NEED BLOOD TEST TO CHECK KIDNEY FUNCTION IN 2 OR 3 DAYS WITH YOUR PRIMARY OR CARDIOLOGIST. FOLLOW UP WITH RENAL DR DEYVI STRICKLAND AS SCHEDULED      Pt has scheduled follow up with Dr Pemberton in 48 hours

## 2024-01-31 RX ORDER — ISOSORBIDE MONONITRATE 60 MG/1
1 TABLET, EXTENDED RELEASE ORAL
Qty: 30 | Refills: 0
Start: 2024-01-31 | End: 2024-02-29

## 2024-01-31 NOTE — ED ADULT TRIAGE NOTE - NS ED NOTE AC HIGH RISK COUNTRIES
[Normal Supraclavicular Lymph Nodes] : normal supraclavicular lymph nodes [Normal Axillary Lymph Nodes] : normal axillary lymph nodes [Normal] : full range of motion and no deformities appreciated [FreeTextEntry1] : SS present during physical exam.  No [de-identified] : Normal S1,S2. Regular rate and rhythm  [de-identified] : Complete breast exam performed in supine and upright position. No palpable masses, tenderness, nipple discharge, inversion, deviation or enlarged axillary or supraclavicular lymph nodes bilaterally.  [de-identified] : Clear breath sounds bilaterally with normal respiratory effort.

## 2024-02-08 ENCOUNTER — INPATIENT (INPATIENT)
Facility: HOSPITAL | Age: 69
LOS: 6 days | Discharge: HOME | DRG: 281 | End: 2024-02-15
Attending: GENERAL ACUTE CARE HOSPITAL | Admitting: GENERAL ACUTE CARE HOSPITAL
Payer: COMMERCIAL

## 2024-02-08 VITALS
DIASTOLIC BLOOD PRESSURE: 79 MMHG | WEIGHT: 139.99 LBS | HEART RATE: 86 BPM | TEMPERATURE: 98 F | RESPIRATION RATE: 16 BRPM | SYSTOLIC BLOOD PRESSURE: 148 MMHG | HEIGHT: 66 IN | OXYGEN SATURATION: 94 %

## 2024-02-08 PROCEDURE — 99285 EMERGENCY DEPT VISIT HI MDM: CPT

## 2024-02-08 NOTE — ED ADULT TRIAGE NOTE - CHIEF COMPLAINT QUOTE
experienced cp at 4pm was seen at outside hospital was told to stay for admission but left ama to come here because  his doctors are here

## 2024-02-09 DIAGNOSIS — E11.9 TYPE 2 DIABETES MELLITUS WITHOUT COMPLICATIONS: ICD-10-CM

## 2024-02-09 DIAGNOSIS — E78.5 HYPERLIPIDEMIA, UNSPECIFIED: ICD-10-CM

## 2024-02-09 DIAGNOSIS — I25.10 ATHEROSCLEROTIC HEART DISEASE OF NATIVE CORONARY ARTERY WITHOUT ANGINA PECTORIS: ICD-10-CM

## 2024-02-09 LAB
ALBUMIN SERPL ELPH-MCNC: 4.7 G/DL — SIGNIFICANT CHANGE UP (ref 3.3–5)
ALP SERPL-CCNC: 89 U/L — SIGNIFICANT CHANGE UP (ref 40–120)
ALT FLD-CCNC: 137 U/L — HIGH (ref 10–45)
ANION GAP SERPL CALC-SCNC: 13 MMOL/L — SIGNIFICANT CHANGE UP (ref 5–17)
ANION GAP SERPL CALC-SCNC: 18 MMOL/L — HIGH (ref 5–17)
APTT BLD: 148.8 SEC — CRITICAL HIGH (ref 24.5–35.6)
APTT BLD: 36.7 SEC — HIGH (ref 24.5–35.6)
APTT BLD: 51.2 SEC — HIGH (ref 24.5–35.6)
AST SERPL-CCNC: 245 U/L — HIGH (ref 10–40)
BASOPHILS # BLD AUTO: 0.04 K/UL — SIGNIFICANT CHANGE UP (ref 0–0.2)
BASOPHILS NFR BLD AUTO: 0.3 % — SIGNIFICANT CHANGE UP (ref 0–2)
BILIRUB SERPL-MCNC: 2 MG/DL — HIGH (ref 0.2–1.2)
BLD GP AB SCN SERPL QL: NEGATIVE — SIGNIFICANT CHANGE UP
BUN SERPL-MCNC: 64 MG/DL — HIGH (ref 7–23)
BUN SERPL-MCNC: 73 MG/DL — HIGH (ref 7–23)
CALCIUM SERPL-MCNC: 10.2 MG/DL — SIGNIFICANT CHANGE UP (ref 8.4–10.5)
CALCIUM SERPL-MCNC: 9.7 MG/DL — SIGNIFICANT CHANGE UP (ref 8.4–10.5)
CHLORIDE SERPL-SCNC: 101 MMOL/L — SIGNIFICANT CHANGE UP (ref 96–108)
CHLORIDE SERPL-SCNC: 103 MMOL/L — SIGNIFICANT CHANGE UP (ref 96–108)
CK MB BLD-MCNC: 2.8 % — SIGNIFICANT CHANGE UP (ref 0–3.5)
CK MB CFR SERPL CALC: 44.4 NG/ML — HIGH (ref 0–6.7)
CK MB CFR SERPL CALC: 58.3 NG/ML — HIGH (ref 0–6.7)
CK SERPL-CCNC: 1574 U/L — HIGH (ref 30–200)
CO2 SERPL-SCNC: 19 MMOL/L — LOW (ref 22–31)
CO2 SERPL-SCNC: 21 MMOL/L — LOW (ref 22–31)
CREAT SERPL-MCNC: 2.43 MG/DL — HIGH (ref 0.5–1.3)
CREAT SERPL-MCNC: 2.75 MG/DL — HIGH (ref 0.5–1.3)
EGFR: 24 ML/MIN/1.73M2 — LOW
EGFR: 28 ML/MIN/1.73M2 — LOW
EOSINOPHIL # BLD AUTO: 0.02 K/UL — SIGNIFICANT CHANGE UP (ref 0–0.5)
EOSINOPHIL NFR BLD AUTO: 0.2 % — SIGNIFICANT CHANGE UP (ref 0–6)
GLUCOSE BLDC GLUCOMTR-MCNC: 154 MG/DL — HIGH (ref 70–99)
GLUCOSE BLDC GLUCOMTR-MCNC: 220 MG/DL — HIGH (ref 70–99)
GLUCOSE BLDC GLUCOMTR-MCNC: 267 MG/DL — HIGH (ref 70–99)
GLUCOSE SERPL-MCNC: 153 MG/DL — HIGH (ref 70–99)
GLUCOSE SERPL-MCNC: 176 MG/DL — HIGH (ref 70–99)
HCT VFR BLD CALC: 24.2 % — LOW (ref 39–50)
HCT VFR BLD CALC: 25.2 % — LOW (ref 39–50)
HCT VFR BLD CALC: 32.1 % — LOW (ref 39–50)
HGB BLD-MCNC: 10.8 G/DL — LOW (ref 13–17)
HGB BLD-MCNC: 8.3 G/DL — LOW (ref 13–17)
HGB BLD-MCNC: 8.3 G/DL — LOW (ref 13–17)
IMM GRANULOCYTES NFR BLD AUTO: 0.3 % — SIGNIFICANT CHANGE UP (ref 0–0.9)
INR BLD: 1.68 RATIO — HIGH (ref 0.85–1.18)
INR BLD: 2.11 RATIO — HIGH (ref 0.85–1.18)
LYMPHOCYTES # BLD AUTO: 1.16 K/UL — SIGNIFICANT CHANGE UP (ref 1–3.3)
LYMPHOCYTES # BLD AUTO: 9.8 % — LOW (ref 13–44)
MAGNESIUM SERPL-MCNC: 2.6 MG/DL — SIGNIFICANT CHANGE UP (ref 1.6–2.6)
MCHC RBC-ENTMCNC: 31.1 PG — SIGNIFICANT CHANGE UP (ref 27–34)
MCHC RBC-ENTMCNC: 32 PG — SIGNIFICANT CHANGE UP (ref 27–34)
MCHC RBC-ENTMCNC: 32.3 PG — SIGNIFICANT CHANGE UP (ref 27–34)
MCHC RBC-ENTMCNC: 32.9 GM/DL — SIGNIFICANT CHANGE UP (ref 32–36)
MCHC RBC-ENTMCNC: 33.6 GM/DL — SIGNIFICANT CHANGE UP (ref 32–36)
MCHC RBC-ENTMCNC: 34.3 GM/DL — SIGNIFICANT CHANGE UP (ref 32–36)
MCV RBC AUTO: 94.2 FL — SIGNIFICANT CHANGE UP (ref 80–100)
MCV RBC AUTO: 94.4 FL — SIGNIFICANT CHANGE UP (ref 80–100)
MCV RBC AUTO: 95 FL — SIGNIFICANT CHANGE UP (ref 80–100)
MONOCYTES # BLD AUTO: 0.8 K/UL — SIGNIFICANT CHANGE UP (ref 0–0.9)
MONOCYTES NFR BLD AUTO: 6.8 % — SIGNIFICANT CHANGE UP (ref 2–14)
NEUTROPHILS # BLD AUTO: 9.77 K/UL — HIGH (ref 1.8–7.4)
NEUTROPHILS NFR BLD AUTO: 82.6 % — HIGH (ref 43–77)
NRBC # BLD: 0 /100 WBCS — SIGNIFICANT CHANGE UP (ref 0–0)
NT-PROBNP SERPL-SCNC: 8381 PG/ML — HIGH (ref 0–300)
PLATELET # BLD AUTO: 188 K/UL — SIGNIFICANT CHANGE UP (ref 150–400)
PLATELET # BLD AUTO: 204 K/UL — SIGNIFICANT CHANGE UP (ref 150–400)
PLATELET # BLD AUTO: 249 K/UL — SIGNIFICANT CHANGE UP (ref 150–400)
POTASSIUM SERPL-MCNC: 4.7 MMOL/L — SIGNIFICANT CHANGE UP (ref 3.5–5.3)
POTASSIUM SERPL-MCNC: 5 MMOL/L — SIGNIFICANT CHANGE UP (ref 3.5–5.3)
POTASSIUM SERPL-SCNC: 4.7 MMOL/L — SIGNIFICANT CHANGE UP (ref 3.5–5.3)
POTASSIUM SERPL-SCNC: 5 MMOL/L — SIGNIFICANT CHANGE UP (ref 3.5–5.3)
PROT SERPL-MCNC: 7 G/DL — SIGNIFICANT CHANGE UP (ref 6–8.3)
PROTHROM AB SERPL-ACNC: 18.2 SEC — HIGH (ref 9.5–13)
PROTHROM AB SERPL-ACNC: 21.7 SEC — HIGH (ref 9.5–13)
RBC # BLD: 2.57 M/UL — LOW (ref 4.2–5.8)
RBC # BLD: 2.67 M/UL — LOW (ref 4.2–5.8)
RBC # BLD: 3.38 M/UL — LOW (ref 4.2–5.8)
RBC # FLD: 14 % — SIGNIFICANT CHANGE UP (ref 10.3–14.5)
RH IG SCN BLD-IMP: POSITIVE — SIGNIFICANT CHANGE UP
SODIUM SERPL-SCNC: 135 MMOL/L — SIGNIFICANT CHANGE UP (ref 135–145)
SODIUM SERPL-SCNC: 140 MMOL/L — SIGNIFICANT CHANGE UP (ref 135–145)
TROPONIN T, HIGH SENSITIVITY RESULT: 447 NG/L — HIGH (ref 0–51)
TROPONIN T, HIGH SENSITIVITY RESULT: 448 NG/L — HIGH (ref 0–51)
TROPONIN T, HIGH SENSITIVITY RESULT: 555 NG/L — HIGH (ref 0–51)
WBC # BLD: 10.82 K/UL — HIGH (ref 3.8–10.5)
WBC # BLD: 11.83 K/UL — HIGH (ref 3.8–10.5)
WBC # BLD: 9.92 K/UL — SIGNIFICANT CHANGE UP (ref 3.8–10.5)
WBC # FLD AUTO: 10.82 K/UL — HIGH (ref 3.8–10.5)
WBC # FLD AUTO: 11.83 K/UL — HIGH (ref 3.8–10.5)
WBC # FLD AUTO: 9.92 K/UL — SIGNIFICANT CHANGE UP (ref 3.8–10.5)

## 2024-02-09 PROCEDURE — 71046 X-RAY EXAM CHEST 2 VIEWS: CPT | Mod: 26

## 2024-02-09 RX ORDER — DAPAGLIFLOZIN 10 MG/1
10 TABLET, FILM COATED ORAL EVERY 24 HOURS
Refills: 0 | Status: DISCONTINUED | OUTPATIENT
Start: 2024-02-09 | End: 2024-02-09

## 2024-02-09 RX ORDER — HEPARIN SODIUM 5000 [USP'U]/ML
3500 INJECTION INTRAVENOUS; SUBCUTANEOUS EVERY 6 HOURS
Refills: 0 | Status: DISCONTINUED | OUTPATIENT
Start: 2024-02-09 | End: 2024-02-09

## 2024-02-09 RX ORDER — GLUCAGON INJECTION, SOLUTION 0.5 MG/.1ML
1 INJECTION, SOLUTION SUBCUTANEOUS ONCE
Refills: 0 | Status: DISCONTINUED | OUTPATIENT
Start: 2024-02-09 | End: 2024-02-15

## 2024-02-09 RX ORDER — METOPROLOL TARTRATE 50 MG
25 TABLET ORAL ONCE
Refills: 0 | Status: COMPLETED | OUTPATIENT
Start: 2024-02-09 | End: 2024-02-09

## 2024-02-09 RX ORDER — SODIUM CHLORIDE 9 MG/ML
1000 INJECTION, SOLUTION INTRAVENOUS
Refills: 0 | Status: DISCONTINUED | OUTPATIENT
Start: 2024-02-09 | End: 2024-02-15

## 2024-02-09 RX ORDER — DEXTROSE 50 % IN WATER 50 %
15 SYRINGE (ML) INTRAVENOUS ONCE
Refills: 0 | Status: DISCONTINUED | OUTPATIENT
Start: 2024-02-09 | End: 2024-02-15

## 2024-02-09 RX ORDER — DEXTROSE 50 % IN WATER 50 %
12.5 SYRINGE (ML) INTRAVENOUS ONCE
Refills: 0 | Status: DISCONTINUED | OUTPATIENT
Start: 2024-02-09 | End: 2024-02-15

## 2024-02-09 RX ORDER — PANTOPRAZOLE SODIUM 20 MG/1
80 TABLET, DELAYED RELEASE ORAL ONCE
Refills: 0 | Status: DISCONTINUED | OUTPATIENT
Start: 2024-02-09 | End: 2024-02-13

## 2024-02-09 RX ORDER — PANTOPRAZOLE SODIUM 20 MG/1
8 TABLET, DELAYED RELEASE ORAL
Qty: 80 | Refills: 0 | Status: DISCONTINUED | OUTPATIENT
Start: 2024-02-09 | End: 2024-02-13

## 2024-02-09 RX ORDER — FUROSEMIDE 40 MG
40 TABLET ORAL DAILY
Refills: 0 | Status: DISCONTINUED | OUTPATIENT
Start: 2024-02-09 | End: 2024-02-09

## 2024-02-09 RX ORDER — METOPROLOL TARTRATE 50 MG
25 TABLET ORAL
Refills: 0 | Status: DISCONTINUED | OUTPATIENT
Start: 2024-02-09 | End: 2024-02-10

## 2024-02-09 RX ORDER — INSULIN LISPRO 100/ML
VIAL (ML) SUBCUTANEOUS
Refills: 0 | Status: DISCONTINUED | OUTPATIENT
Start: 2024-02-09 | End: 2024-02-15

## 2024-02-09 RX ORDER — ACETAMINOPHEN 500 MG
650 TABLET ORAL EVERY 6 HOURS
Refills: 0 | Status: DISCONTINUED | OUTPATIENT
Start: 2024-02-09 | End: 2024-02-15

## 2024-02-09 RX ORDER — NITROGLYCERIN 6.5 MG
0.4 CAPSULE, EXTENDED RELEASE ORAL
Refills: 0 | Status: COMPLETED | OUTPATIENT
Start: 2024-02-09 | End: 2024-02-09

## 2024-02-09 RX ORDER — HEPARIN SODIUM 5000 [USP'U]/ML
INJECTION INTRAVENOUS; SUBCUTANEOUS
Qty: 25000 | Refills: 0 | Status: DISCONTINUED | OUTPATIENT
Start: 2024-02-09 | End: 2024-02-09

## 2024-02-09 RX ORDER — CLOPIDOGREL BISULFATE 75 MG/1
75 TABLET, FILM COATED ORAL DAILY
Refills: 0 | Status: DISCONTINUED | OUTPATIENT
Start: 2024-02-09 | End: 2024-02-11

## 2024-02-09 RX ORDER — INSULIN LISPRO 100/ML
5 VIAL (ML) SUBCUTANEOUS
Refills: 0 | Status: DISCONTINUED | OUTPATIENT
Start: 2024-02-09 | End: 2024-02-09

## 2024-02-09 RX ORDER — ISOSORBIDE DINITRATE 5 MG/1
20 TABLET ORAL THREE TIMES A DAY
Refills: 0 | Status: DISCONTINUED | OUTPATIENT
Start: 2024-02-09 | End: 2024-02-10

## 2024-02-09 RX ORDER — DEXTROSE 50 % IN WATER 50 %
25 SYRINGE (ML) INTRAVENOUS ONCE
Refills: 0 | Status: DISCONTINUED | OUTPATIENT
Start: 2024-02-09 | End: 2024-02-15

## 2024-02-09 RX ORDER — INSULIN LISPRO 100/ML
VIAL (ML) SUBCUTANEOUS AT BEDTIME
Refills: 0 | Status: DISCONTINUED | OUTPATIENT
Start: 2024-02-09 | End: 2024-02-09

## 2024-02-09 RX ORDER — HEPARIN SODIUM 5000 [USP'U]/ML
3800 INJECTION INTRAVENOUS; SUBCUTANEOUS EVERY 6 HOURS
Refills: 0 | Status: DISCONTINUED | OUTPATIENT
Start: 2024-02-09 | End: 2024-02-09

## 2024-02-09 RX ORDER — INSULIN LISPRO 100/ML
3 VIAL (ML) SUBCUTANEOUS
Refills: 0 | Status: DISCONTINUED | OUTPATIENT
Start: 2024-02-09 | End: 2024-02-09

## 2024-02-09 RX ORDER — HEPARIN SODIUM 5000 [USP'U]/ML
3500 INJECTION INTRAVENOUS; SUBCUTANEOUS ONCE
Refills: 0 | Status: COMPLETED | OUTPATIENT
Start: 2024-02-09 | End: 2024-02-09

## 2024-02-09 RX ORDER — INSULIN GLARGINE 100 [IU]/ML
15 INJECTION, SOLUTION SUBCUTANEOUS AT BEDTIME
Refills: 0 | Status: ACTIVE | OUTPATIENT
Start: 2024-02-09 | End: 2025-01-07

## 2024-02-09 RX ORDER — ATORVASTATIN CALCIUM 80 MG/1
80 TABLET, FILM COATED ORAL AT BEDTIME
Refills: 0 | Status: DISCONTINUED | OUTPATIENT
Start: 2024-02-09 | End: 2024-02-15

## 2024-02-09 RX ORDER — ASPIRIN/CALCIUM CARB/MAGNESIUM 324 MG
81 TABLET ORAL DAILY
Refills: 0 | Status: DISCONTINUED | OUTPATIENT
Start: 2024-02-09 | End: 2024-02-15

## 2024-02-09 RX ORDER — ACETAMINOPHEN 500 MG
1000 TABLET ORAL ONCE
Refills: 0 | Status: DISCONTINUED | OUTPATIENT
Start: 2024-02-09 | End: 2024-02-15

## 2024-02-09 RX ORDER — HEPARIN SODIUM 5000 [USP'U]/ML
3800 INJECTION INTRAVENOUS; SUBCUTANEOUS ONCE
Refills: 0 | Status: DISCONTINUED | OUTPATIENT
Start: 2024-02-09 | End: 2024-02-09

## 2024-02-09 RX ORDER — ISOSORBIDE MONONITRATE 60 MG/1
30 TABLET, EXTENDED RELEASE ORAL DAILY
Refills: 0 | Status: DISCONTINUED | OUTPATIENT
Start: 2024-02-09 | End: 2024-02-09

## 2024-02-09 RX ORDER — INSULIN GLARGINE 100 [IU]/ML
20 INJECTION, SOLUTION SUBCUTANEOUS AT BEDTIME
Refills: 0 | Status: DISCONTINUED | OUTPATIENT
Start: 2024-02-09 | End: 2024-02-09

## 2024-02-09 RX ADMIN — HEPARIN SODIUM 700 UNIT(S)/HR: 5000 INJECTION INTRAVENOUS; SUBCUTANEOUS at 07:12

## 2024-02-09 RX ADMIN — Medication 0.4 MILLIGRAM(S): at 09:23

## 2024-02-09 RX ADMIN — Medication 0.4 MILLIGRAM(S): at 18:29

## 2024-02-09 RX ADMIN — Medication 0.4 MILLIGRAM(S): at 04:08

## 2024-02-09 RX ADMIN — CLOPIDOGREL BISULFATE 75 MILLIGRAM(S): 75 TABLET, FILM COATED ORAL at 12:34

## 2024-02-09 RX ADMIN — Medication 3 UNIT(S): at 12:35

## 2024-02-09 RX ADMIN — Medication 81 MILLIGRAM(S): at 12:34

## 2024-02-09 RX ADMIN — ISOSORBIDE DINITRATE 20 MILLIGRAM(S): 5 TABLET ORAL at 22:22

## 2024-02-09 RX ADMIN — Medication 0.4 MILLIGRAM(S): at 22:15

## 2024-02-09 RX ADMIN — PANTOPRAZOLE SODIUM 10 MG/HR: 20 TABLET, DELAYED RELEASE ORAL at 22:16

## 2024-02-09 RX ADMIN — Medication 5 UNIT(S): at 18:27

## 2024-02-09 RX ADMIN — HEPARIN SODIUM 3500 UNIT(S): 5000 INJECTION INTRAVENOUS; SUBCUTANEOUS at 07:09

## 2024-02-09 RX ADMIN — Medication 3: at 12:36

## 2024-02-09 RX ADMIN — Medication 25 MILLIGRAM(S): at 10:30

## 2024-02-09 RX ADMIN — Medication 0.4 MILLIGRAM(S): at 03:09

## 2024-02-09 RX ADMIN — ATORVASTATIN CALCIUM 80 MILLIGRAM(S): 80 TABLET, FILM COATED ORAL at 22:16

## 2024-02-09 RX ADMIN — Medication 2: at 18:26

## 2024-02-09 RX ADMIN — ISOSORBIDE MONONITRATE 30 MILLIGRAM(S): 60 TABLET, EXTENDED RELEASE ORAL at 12:34

## 2024-02-09 RX ADMIN — Medication 0.4 MILLIGRAM(S): at 12:53

## 2024-02-09 RX ADMIN — INSULIN GLARGINE 15 UNIT(S): 100 INJECTION, SOLUTION SUBCUTANEOUS at 22:18

## 2024-02-09 RX ADMIN — Medication 25 MILLIGRAM(S): at 22:15

## 2024-02-09 NOTE — ED ADULT NURSE NOTE - NSICDXPASTMEDICALHX_GEN_ALL_CORE_FT
Post-Care Instructions: I reviewed with the patient in detail post-care instructions. No topical medications for 3 days. Call With any questions. Render Post-Care Instructions In Note?: yes Detail Level: Zone Consent was obtained and risks were reviewed including but not limited to scarring, infection, bleeding, scabbing, incomplete removal, and allergy to anesthesia. Prep Text (Optional): Prior to removal the treatment areas were prepped in the usual fashion. Glycolic Acid30% TCA15% Zinc% Extraction Method: cotton-tipped applicators and gentle pressure Acne Type: Comedonal Lesions PAST MEDICAL HISTORY:  Afib     CAD in native artery     CHF (congestive heart failure)     Essential hypertension, hypertension with unspecified goal     Former smoker     Hematoma of leg     Hyperlipidemia, unspecified hyperlipidemia type     Stented coronary artery     Type 2 diabetes mellitus

## 2024-02-09 NOTE — ED ADULT NURSE REASSESSMENT NOTE - NS ED NURSE REASSESS COMMENT FT1
Pt received from RUSSELL Payton. Pt is AOx4. Breathing unlabored and spontaneously, sating 100 % on room air, skin warm and dry. Resting comfortably in bed, no pain, discomfort, or distress at this time.  VSS. Placed on cardiac monitor, NSR noted, Pt denies any chest pain or SOB.  Pt safety maintained, pt oriented to unit & environment, call bell between reach, instructed to use call bell for all needs, bed in lowest position. Awaiting for a bed assignment. IVL in place & WDL. Pt received from RUSSELL Payton. Pt is AOx4. Breathing unlabored and spontaneously, sating 100 % on room air, skin warm and dry. Resting comfortably in bed, no pain, discomfort, or distress at this time.  VSS. Placed on cardiac monitor, AFIB noted, Pt denies any chest pain or SOB.  Pt safety maintained, pt oriented to unit & environment, call bell between reach, instructed to use call bell for all needs, bed in lowest position. Awaiting for a bed assignment. IVL in place & WDL.

## 2024-02-09 NOTE — ED PROVIDER NOTE - CLINICAL SUMMARY MEDICAL DECISION MAKING FREE TEXT BOX
MDM/Summary/DDx (includes but is not limited to): This high risk 69-year-old male,  coming in with chest pain.  Currently experiencing his chest pain.   Exam and history is not consistent with AAA.  Exam and history is possibly consistent with angina versus electrolyte abnormality versus arrhythmia.  Patient has A-fib and is relatively rate controlled on the monitor at me for 110-120.  patient not in shock state.  Nonfocal exam.  EKG largely unchanged from previous admission.  Labs: cbc cmp trop bnp mag   Imaging: xr chest   Tx: Supportive, pain/nausea medications as pt requires/requests.  Consults/Resources: cards   Dispo:  will discuss with cardiology, patient may need to be admitted/CDU for obs trops     Triage note reviewed. VS reviewed. EKG reviewed and documented in "RESULTS" section, if possible at given time.     DDx in MDM includes the most likely ddx, but is not limited to solely what is listed. Clinical course may alter/deviate from the above plan. When possible, progress notes written, as needed, and are included in "PROGRESS NOTE" section below.       Medical, family, and social determinants of health reviewed and discussed w/ pt/family/caretaker, when allowable, and is incorporated into note above, whenever possible.

## 2024-02-09 NOTE — CONSULT NOTE ADULT - SUBJECTIVE AND OBJECTIVE BOX
69 year old male with severe CAD S/P multiple PCI's including multiple LM and LAD stents.  On his most recent angiogram he was felt to have poor distal targets and was deemed not a good surgical candidate ( though he was not seen formally by CTS ).  He now returns with recurrent chest pain relieved with NTG.  He is now pain free.  He also has CKD and his Crt has risen from 1.8 to 2.4   He has been started on IV Heparin    PMH:  Chronic AF  CKD   DM    Out patient meds:  ASA 81 mg qd                              Plavix 75 mg qd                              Xarelto 15 mg qd                              Atorvastatin 80 mg qd                              Zetia 10 mg qd                              Entresto 49/51 mg bid                              Farxiga 10 mg qd                              Furosemide 40 mg qd                              Imdur 30 mg qd                              Metoprolol Succ 50 mg qd                              Insulin    /75    AF  Lungs clear  Irregular rhythm  No edema    BUN 64  Crt 2.43   CO2  19  Troponin 448   447   WBC 11.83   Hgb 10.8  Hct 32.1  Plt 249K    EKG:  AF  QS in V1 and V2  T wave inversions in I, AvL, V3-V6    Echo - 1/10/54   EF 50%  mild-moderate MR  trace TR with est PA 50 mm Hg   mild-moderate LAE    Imp:  Severe CAD S/P multiple PCI's and NSTEMI now presents with another NSTEMI  Most recent EF was 50%    There is no evidence of CHF  Rec:  Resume ASA and Plavix   Hold Xarelto   Continue IV Heparin   Continue maximum dose Atorvastatin and Zetia   Hold Entresto due to elevated Crt   Hold Farxiga  Metoprolol 50 mg bid   Hold Lasix   Start Isordil 20 mg tid  Will obtain a formal CTS evaluation - Ildefonso Nunn   Will also ask Dr ILDA Dominguez to consult again although the likely utility of another catheter intervention is low.

## 2024-02-09 NOTE — CONSULT NOTE ADULT - ASSESSMENT
This is a 70 y/o male with hx of CAD s/p multiple  stents (most recent to pLM (70%), pLAD, mLAD on 1/8/24 with Dr. Dominguez), HFrEF (EF~50% as of 11/26/23), A-fib, HTN, HLD, T2DM, CKD3, former smoker who presents to ED with complaints of chest pain. CT surgery Dr Nunn consulted.  2/9 Patient seen and  examined in company of Dr Nunn. All las  and radiographic images reviewed by Dr. Nunn. Patient is not a surgical candidate, recommend medical therapy ispecifically maximizing  beta blockers - metoprolol  RANEXA and nitatres         This is a 70 y/o male with hx of CAD s/p multiple  stents (most recent to pLM (70%), pLAD, mLAD on 1/8/24 with Dr. Dominguez), HFrEF (EF~50% as of 11/26/23), A-fib, HTN, HLD, T2DM, CKD3, former smoker who presents to ED with complaints of chest pain. CT surgery Dr Nunn consulted.  2/9 Patient seen and  examined in company of Dr Nunn. All lab and radiographic images reviewed by Dr. Nunn. Patient is not a surgical candidate, recommend medical therapy specifically - maximizing  beta blockers- nitrates and RANEXA.  Plan of  care explained to patient who expresses  understanding.

## 2024-02-09 NOTE — ED PROVIDER NOTE - PROGRESS NOTE DETAILS
Attending note (Enmanuel): At bedtime troponin at outside hospital was 150 troponin here for 48.  Patient is chest pain-free at this time.  Case discussed with cardiology fellow who will consult and recommending admission to medicine on telemetry for further evaluation and management. EMILIANO Polk PGY-2: Discussed with the private physician/cardiologist covering for Bert CUMMINGS  patient at this time is still having chest pain.  Nitroglycerin helping. Physician stated the patient should be admitted to the hospitalist (Violeta). EMILIANO Polk PGY-2: Discussed with the private physician/cardiologist covering for Bert CUMMINGS  patient at this time is still having chest pain.  Nitroglycerin helping pain. Physician stated the patient should be admitted to the hospitalist (Violeta). Noted during conversation all lab results including new troponin level ~400. EMILIANO Polk PGY-2: Discussed with Violeta. Will heparinize.

## 2024-02-09 NOTE — CONSULT NOTE ADULT - SUBJECTIVE AND OBJECTIVE BOX
History of Present Illness:  This is a 68 y/o male with hx of CAD s/p multiple  stents (most recent to pLM (70%), pLAD, mLAD on 24 with Dr. Dominguez), HFrEF (EF~50% as of 23), A-fib, HTN, HLD, T2DM, CKD3, former smoker who presents to ED with complaints of chest pain. CT surgery Dr Nunn consulted.         Past Medical History  Carotid Stenosis  left    Diabetes Mellitus  x 15 yrs  glucovance  bs range     Neuropathy  b/l feet    Former smoker    CAD in native artery    Type 2 diabetes mellitus    Hyperlipidemia, unspecified hyperlipidemia type    Essential hypertension, hypertension with unspecified goal    Afib    Hematoma of leg    Stented coronary artery    CHF (congestive heart failure)        Past Surgical History  No Past Surgical History    s/p Carotid Endarterectomy  left        MEDICATIONS  (STANDING):  aspirin enteric coated 81 milliGRAM(s) Oral daily  atorvastatin 80 milliGRAM(s) Oral at bedtime  clopidogrel Tablet 75 milliGRAM(s) Oral daily  dextrose 5%. 1000 milliLiter(s) (100 mL/Hr) IV Continuous <Continuous>  dextrose 5%. 1000 milliLiter(s) (50 mL/Hr) IV Continuous <Continuous>  dextrose 50% Injectable 25 Gram(s) IV Push once  dextrose 50% Injectable 12.5 Gram(s) IV Push once  dextrose 50% Injectable 25 Gram(s) IV Push once  glucagon  Injectable 1 milliGRAM(s) IntraMuscular once  heparin  Infusion.  Unit(s)/Hr (7 mL/Hr) IV Continuous <Continuous>  insulin glargine Injectable (LANTUS) 15 Unit(s) SubCutaneous at bedtime  insulin lispro (ADMELOG) corrective regimen sliding scale   SubCutaneous three times a day before meals  insulin lispro (ADMELOG) corrective regimen sliding scale   SubCutaneous at bedtime  insulin lispro Injectable (ADMELOG) 5 Unit(s) SubCutaneous three times a day before meals  isosorbide   mononitrate ER Tablet (IMDUR) 30 milliGRAM(s) Oral daily  metoprolol tartrate 25 milliGRAM(s) Oral two times a day    MEDICATIONS  (PRN):  acetaminophen     Tablet .. 650 milliGRAM(s) Oral every 6 hours PRN Mild Pain (1 - 3), Moderate Pain (4 - 6)  dextrose Oral Gel 15 Gram(s) Oral once PRN Blood Glucose LESS THAN 70 milliGRAM(s)/deciliter  heparin   Injectable 3500 Unit(s) IV Push every 6 hours PRN For aPTT less than 40  nitroglycerin     SubLingual 0.4 milliGRAM(s) SubLingual every 5 minutes PRN Chest Pain      Vital Signs Last 24 Hrs  T(C): 36.4 (24 @ 05:41), Max: 36.8 (24 @ 02:57)  T(F): 97.6 (24 @ 05:41), Max: 98.2 (24 @ 02:57)  HR: 111 (24 @ 12:32) (86 - 145)  BP: 115/72 (24 @ 12:32) (107/56 - 164/102)  RR: 18 (24 @ 09:33) (16 - 18)  SpO2: 99% (24 @ 09:33) (94% - 100%)           Daily Height in cm: 167.64 (2024 22:50)    Daily   Admit Wt: Drug Dosing Weight  Height (cm): 167.6 (2024 22:50)  Weight (kg): 59.2 (2024 06:45)  BMI (kg/m2): 21.1 (2024 06:45)  BSA (m2): 1.67 (2024 06:45)    Allergies: No Known Allergies      SOCIAL HISTORY:  Smoker: [x ] Yes  [X] No               quit  ETOH use: [ ] Yes  [X] No             Pt denies  Ilicit Drug use:  [ ] Yes  [X] No     Pt denies    FAMILY HISTORY:  Family history of heart disease  father  age 71    FH: atrial fibrillation  sister        Review of Systems  GENERAL:  no weakness, fatigue, fevers or chills  NEURO: no dizziness, numbness, tingling or weakness  SKIN: no itching, burning, rashes, or lesions   HEENT: no visual changes;  no headache, no vertigo, no recent colds  RESPIRATORY: endorses  shortness of breath, denies  cough, sputum, wheezing  CARDIOVASCULAR:  endorses chest pain,  or palpitations  GI: no abd pain. no N/V/D.  PERIPHERAL VASCULAR: no swelling, no tenderness, no erythema    PHYSICAL EXAM  General:  thin   developed, NAD.                                              Neuro: Normal exam oriented to person/place & time with no focal motor or sensory  deficits.                    Eyes: Normal exam of conjunctiva & lids, pupils equally reactive.   ENT: Normal exam of nasal/oral mucosa with absence of cyanosis.   Neck: Normal exam of jugular veins, trachea & thyroid.   Chest: Normal lung exam with good air movement absence of wheezes, rales, or rhonchi.                                                                         CV:  Auscultation: normal S1S2, RRR   Carotids: No Bruits[X]  Abdominal Aorta: normal [X] nonpalpable[X]                                                                         GI: Normal exam of abdomen with no noted masses or tenderness. +[id number]                                                                                            Extremities: Normal no evidence of cyanosis or deformity, Edema: none  Lower Extremity Pulses: Right[+2DP] Left[+2DP] Varicosities[none]  SKIN : Normal exam to inspection & palpation.                                                           LABS:                        10.8   11.83 )-----------( 249      ( 2024 03:06 )             32.1     02-09    140  |  103  |  64<H>  ----------------------------<  176<H>  5.0   |  19<L>  |  2.43<H>    Ca    10.2      2024 03:06  Mg     2.6     02-09    TPro  7.0  /  Alb  4.7  /  TBili  2.0<H>  /  DBili  x   /  AST  245<H>  /  ALT  137<H>  /  AlkPhos  89  02-09    PT/INR - ( 2024 06:38 )   PT: 21.7 sec;   INR: 2.11 ratio         PTT - ( 2024 06:38 )  PTT:36.7 sec      Cardiac Cath:  cat< from: Cardiac Catheterization (24 @ 13:52) >    LAD   Left anterior descendingartery: Angiography shows minor  irregularities. patent prior stents . Distal left anterior descending:  Angiography shows severe atherosclerosis. There is a 70 % stenosis.      CX   Circumflex: Angiography shows mild atherosclerosis.      RCA   Right coronary artery: Angiography shows moderate atherosclerosis.      Ramus   Ramus intermedius: Angiography shows complete occlusion. There is a  100 % stenosis.    < end of copied text >    TTE / YVES:< from: TTE W or WO Ultrasound Enhancing Agent (01.29.24 @ 18:15) >   1. Left ventricular cavity is small. Left ventricular wall thickness is normal. Left ventricular systolic function is normal with an ejection fraction of 47 % by Ulloa's method of disks.   2. Unable to evaluate wall motion due to poor image quality. There is possible apical hypokinesis.   3. There is severe (grade 3) left ventricular diastolic dysfunction, with elevated filling pressure.   4. Normal right ventricular cavity size and normalsystolic function.   5. There is mild tricuspid regurgitation. Estimated pulmonary artery systolic pressure is 23 mmHg.   6. Compared to the transthoracic echocardiogram performed on 2023, there have been no significant interval changes.    ____________________________________________________________________    < end of copied text >

## 2024-02-09 NOTE — CONSULT NOTE ADULT - SUBJECTIVE AND OBJECTIVE BOX
HPI: Mr. Velázquez is a 69 year-old man with history of multiple medical issues including hypertension, diabetes mellitus, coronary artery disease, atrial fibrillation, and chronic kidney disease. He presented overnight to an outside hospital with acute-onset chest pain waking him up at 4a.m; he left AMA from the ER and came to the [facility] ER so that he could be treated by his own doctors. He is s/p multiple admissions within the past month at [facility] for chest pain/NSTEMI. He was admitted at [facility] from -24 after presenting with chest pain and ruling in for NSTEMI. He underwent cardiac cath 24 with Dr. Julito Dominguez; drug eluting stents were deployed at sites of his 80% proximal LAD lesion, 80% mid-LAD lesion, and 70% proximal left main lesion. I saw him at the office on 24, at which time he complained of recurrent chest pain for the past few days. He was not willing to go to the ER at that time, but relented and came to the ER on 24 after his cardiac enzymes from 24 returned elevated. He underwent cardiac cath 24; his stents were patent.      PAST MEDICAL & SURGICAL HISTORY:  HTN  HLD  HLD  CAD-stents  CHF  AFib  Carotid stenosis - L CEA  CKD    Allergies  No Known Allergies    SOCIAL HISTORY:  (+)former smoker    FAMILY HISTORY:  CAD -father  age 71  Atrial fibrillation -sister    REVIEW OF SYSTEMS:  CONSTITUTIONAL: No weakness, fevers or chills  EYES/ENT: No visual changes;  No vertigo or throat pain   NECK: No pain or stiffness  RESPIRATORY: No cough, wheezing, hemoptysis; No shortness of breath  CARDIOVASCULAR: (+)chest pain  GASTROINTESTINAL: No abdominal or epigastric pain. No nausea, vomiting, or hematemesis; No diarrhea or constipation. No melena or hematochezia.  GENITOURINARY: No dysuria, frequency or hematuria  NEUROLOGICAL: No numbness or weakness  SKIN: No itching, burning, rashes, or lesions   All other review of systems is negative unless indicated above.    VITAL:  T(C): , Max: 36.8 (24 @ 02:57)  T(F): , Max: 98.2 (24 @ 02:57)  HR: 126 (24 @ 09:33)  BP: 114/73 (24 @ 09:33)  BP(mean): 85 (24 @ 05:41)  RR: 18 (24 @ 09:33)  SpO2: 99% (24 @ 09:33)    PHYSICAL EXAM:  Constitutional: NAD, Alert  HEENT: NCAT, MMM  Neck: Supple, No JVD  Respiratory: CTA-b/l  Cardiovascular: RRR s1s2, no m/r/g  Gastrointestinal: BS+, soft, NT/ND  Extremities: No peripheral edema b/l  Neurological: no focal deficits; strength grossly intact  Back: no CVAT b/l  Skin: No rashes, no nevi    LABS:                        10.8   11.83 )-----------( 249      ( 2024 03:06 )             32.1     Na(140)/K(5.0)/Cl(103)/HCO3(19)/BUN(64)/Cr(2.43)Glu(176)/Ca(10.2)/Mg(2.6)/PO4(--)     @ 03:06      IMAGING:  < from: Xray Chest 2 Views PA/Lat (24 @ 03:57) >  Clear lungs.    < from: TTE W or WO Ultrasound Enhancing Agent (24 @ 18:15) >   1. Left ventricular cavity is small. Left ventricular wall thickness is normal. Left ventricular systolic function is normal with an ejection fraction of 47 % by Ulloa's method of disks.   2. Unable to evaluate wall motion due to poor image quality. There is possible apical hypokinesis.   3. There is severe (grade 3) left ventricular diastolic dysfunction, with elevated filling pressure.   4. Normal right ventricular cavity size and normalsystolic function.   5. There is mild tricuspid regurgitation. Estimated pulmonary artery systolic pressure is 23 mmHg.   6. Compared to the transthoracic echocardiogram performed on 2023, there have been no significant interval changes.        ASSESSMENT:  (1)Renal - CKD3-4 - due to hypertension/atheroembolic disease. Fluctuating numbers based on hemodynamic status  (2)Lytes - grossly acceptable for now  (3)Chest pain -     RECOMMEND:      Thank you for involving [facility] in this patient's care.    With warm regards,    Markell Walton MD   [facility]  Office: [phone]  Cell: [phone]             HPI: Mr. Velázquez is a 69 year-old man with history of multiple medical issues including hypertension, diabetes mellitus, coronary artery disease, atrial fibrillation, and chronic kidney disease. He presented overnight to an outside hospital with acute-onset chest pain waking him up at 4a.m; he left AMA from the ER and came to the [facility] ER so that he could be treated by his own doctors. He is s/p multiple admissions within the past month at [facility] for chest pain/NSTEMI. He was admitted at [facility] from -24 after presenting with chest pain and ruling in for NSTEMI. He underwent cardiac cath 24 with Dr. Julito Dominguez; drug eluting stents were deployed at sites of his 80% proximal LAD lesion, 80% mid-LAD lesion, and 70% proximal left main lesion. I saw him at the office on 24, at which time he complained of recurrent chest pain for the past few days. He was not willing to go to the ER at that time, but relented and came to the ER on 24 after his cardiac enzymes from 24 returned elevated. He underwent cardiac cath 24; his stents were patent.      PAST MEDICAL & SURGICAL HISTORY:  HTN  HLD  HLD  CAD-stents  CHF  AFib  Carotid stenosis - L CEA  CKD    Allergies  No Known Allergies    SOCIAL HISTORY:  (+)former smoker    FAMILY HISTORY:  CAD -father  age 71  Atrial fibrillation -sister    REVIEW OF SYSTEMS:  CONSTITUTIONAL: No weakness, fevers or chills  EYES/ENT: No visual changes;  No vertigo or throat pain   NECK: No pain or stiffness  RESPIRATORY: No cough, wheezing, hemoptysis; No shortness of breath  CARDIOVASCULAR: (+)chest pain  GASTROINTESTINAL: No abdominal or epigastric pain. No nausea, vomiting, or hematemesis; No diarrhea or constipation. No melena or hematochezia.  GENITOURINARY: No dysuria, frequency or hematuria  NEUROLOGICAL: No numbness or weakness  SKIN: No itching, burning, rashes, or lesions   All other review of systems is negative unless indicated above.    VITAL:  T(C): , Max: 36.8 (24 @ 02:57)  T(F): , Max: 98.2 (24 @ 02:57)  HR: 126 (24 @ 09:33)  BP: 114/73 (24 @ 09:33)  BP(mean): 85 (24 @ 05:41)  RR: 18 (24 @ 09:33)  SpO2: 99% (24 @ 09:33)    PHYSICAL EXAM:  Constitutional: NAD, Alert  HEENT: NCAT, MMM  Neck: Supple, No JVD  Respiratory: CTA-b/l  Cardiovascular: tachy, irreg s1s2  Gastrointestinal: BS+, soft, NT/ND  Extremities: No peripheral edema b/l  Neurological: no focal deficits; strength grossly intact  Back: no CVAT b/l  Skin: No rashes, no nevi    LABS:                        10.8   11.83 )-----------( 249      ( 2024 03:06 )             32.1     Na(140)/K(5.0)/Cl(103)/HCO3(19)/BUN(64)/Cr(2.43)Glu(176)/Ca(10.2)/Mg(2.6)/PO4(--)     @ 03:06    Trop 447<==448<==260 (24)  lactate 1.9    IMAGING:  < from: Xray Chest 2 Views PA/Lat (24 @ 03:57) >  Clear lungs.    < from: TTE W or WO Ultrasound Enhancing Agent (24 @ 18:15) >   1. Left ventricular cavity is small. Left ventricular wall thickness is normal. Left ventricular systolic function is normal with an ejection fraction of 47 % by Ulloa's method of disks.   2. Unable to evaluate wall motion due to poor image quality. There is possible apical hypokinesis.   3. There is severe (grade 3) left ventricular diastolic dysfunction, with elevated filling pressure.   4. Normal right ventricular cavity size and normalsystolic function.   5. There is mild tricuspid regurgitation. Estimated pulmonary artery systolic pressure is 23 mmHg.   6. Compared to the transthoracic echocardiogram performed on 2023, there have been no significant interval changes.        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  (2)BARRY - likely prerenal in setting of Lasix, Farxiga, and Entresto. We can hold these meds for now, such that the renal parameters improve over the next few days, in case cardiac cath and/or OHS is needed  (3)Metabolic acidosis - mild - increased anion gap - mild lactic acidosis from MI  (4)Chest pain - recurrent chest pain from recurrent small NSTEMIs.     RECOMMEND:  (1)No Entresto for now  (2)No Farxiga for now  (3)No standing Lasix for now; can give intermittent doses prn dyspnea  (4)If for cath, would give NS 100cc/h x 5h periprocedurally to minimize DENNIS risk  (5)BMP daily  (6)Dose new meds for GFR 20-30ml/min    Thank you for involving [facility] in this patient's care.    With warm regards,    Markell Walton MD   [facility]  Office: [phone]  Cell: [phone]

## 2024-02-09 NOTE — CONSULT NOTE ADULT - SUBJECTIVE AND OBJECTIVE BOX
HPI:  68 y/o male with hx of CAD s/p multiple  stents (most recent to pLM (70%), pLAD, mLAD on 24 with Dr. Dominguez), HFrEF (EF~50% as of 23), A-fib, HTN, HLD, T2DM, CKD3, former smoker (2024 11:23)      Patient has history of diabetes, A1C 6.7 % on home Semglee insulin and Farxiga   Endo was consulted for glycemic control.      PAST MEDICAL & SURGICAL HISTORY:  Former smoker      CAD in native artery      Type 2 diabetes mellitus      Hyperlipidemia, unspecified hyperlipidemia type      Essential hypertension, hypertension with unspecified goal      Afib      Hematoma of leg      Stented coronary artery      CHF (congestive heart failure)      s/p Carotid Endarterectomy  left          FAMILY HISTORY:  Family history of heart disease  father  age 71    FH: atrial fibrillation  sister        Social History:            HOME MEDICATIONS:  Home Medications:  acetaminophen 325 mg oral tablet: 2 tab(s) orally every 6 hours As needed Temp greater or equal to 38C (100.4F), Moderate Pain (4 - 6) (2024 09:02)  atorvastatin 80 mg oral tablet: 1 tab(s) orally once a day (at bedtime) (2024 09:02)  clopidogrel 75 mg oral tablet: 1 tab(s) orally once a day (2024 09:02)  Entresto 49 mg-51 mg oral tablet: 1 tab(s) orally 2 times a day (2024 09:02)  ezetimibe 10 mg oral tablet: 1 tab(s) orally once a day (2024 09:02)  Farxiga 10 mg oral tablet: 1 tab(s) orally once a day (2024 09:02)  furosemide 40 mg oral tablet: 1 tab(s) orally once a day (2024 09:02)  multivitamin gummies: 1 gummy orally once a day (2024 09:02)  Semglee (Prefilled Pen) 100 units/mL subcutaneous solution: 28 unit(s) subcutaneous once a day (at bedtime) (2024 09:02)            MEDICATIONS  (STANDING):  aspirin enteric coated 81 milliGRAM(s) Oral daily  atorvastatin 80 milliGRAM(s) Oral at bedtime  clopidogrel Tablet 75 milliGRAM(s) Oral daily  dextrose 5%. 1000 milliLiter(s) (50 mL/Hr) IV Continuous <Continuous>  dextrose 5%. 1000 milliLiter(s) (100 mL/Hr) IV Continuous <Continuous>  dextrose 50% Injectable 12.5 Gram(s) IV Push once  dextrose 50% Injectable 25 Gram(s) IV Push once  dextrose 50% Injectable 25 Gram(s) IV Push once  glucagon  Injectable 1 milliGRAM(s) IntraMuscular once  heparin  Infusion.  Unit(s)/Hr (7 mL/Hr) IV Continuous <Continuous>  insulin glargine Injectable (LANTUS) 15 Unit(s) SubCutaneous at bedtime  insulin lispro (ADMELOG) corrective regimen sliding scale   SubCutaneous at bedtime  insulin lispro (ADMELOG) corrective regimen sliding scale   SubCutaneous three times a day before meals  insulin lispro Injectable (ADMELOG) 5 Unit(s) SubCutaneous three times a day before meals  isosorbide   mononitrate ER Tablet (IMDUR) 30 milliGRAM(s) Oral daily  metoprolol tartrate 25 milliGRAM(s) Oral two times a day    MEDICATIONS  (PRN):  acetaminophen     Tablet .. 650 milliGRAM(s) Oral every 6 hours PRN Mild Pain (1 - 3), Moderate Pain (4 - 6)  dextrose Oral Gel 15 Gram(s) Oral once PRN Blood Glucose LESS THAN 70 milliGRAM(s)/deciliter  heparin   Injectable 3500 Unit(s) IV Push every 6 hours PRN For aPTT less than 40  nitroglycerin     SubLingual 0.4 milliGRAM(s) SubLingual every 5 minutes PRN Chest Pain      Allergies    No Known Allergies    Intolerances        Review of Systems:  Neuro: No HA, no dizziness  Cardiovascular: No chest pain, no palpitations  Respiratory: no SOB, no cough  GI: No nausea, vomiting, abdominal pain  MSK: Denies joint/muscle pain      ALL OTHER SYSTEMS REVIEWED AND NEGATIVE        PHYSICAL EXAM:  VITALS: T(C): 36.4 (24 @ 05:41)  T(F): 97.6 (24 @ 05:41), Max: 98.2 (24 @ 02:57)  HR: 111 (24 @ 12:32) (86 - 145)  BP: 115/72 (24 @ 12:32) (107/56 - 164/102)  RR:  (16 - 18)  SpO2:  (94% - 100%)  Wt(kg): --  GENERAL: NAD, well-groomed, well-developed  NEURO:  alert and oriented  RESPIRATORY: Clear to auscultation bilaterally; No rales, rhonchi, wheezing  CARDIOVASCULAR: Si S2  GI: Soft, non distended, normal bowel sounds  MUSCULOSKELETAL: Moves all extremities equally       POCT Blood Glucose.: 267 mg/dL (24 @ 12:15)                            10.8   11.83 )-----------( 249      ( 2024 03:06 )             32.1           140  |  103  |  64<H>  ----------------------------<  176<H>  5.0   |  19<L>  |  2.43<H>    eGFR: 28<L>    Ca    10.2        Mg     2.6         TPro  7.0  /  Alb  4.7  /  TBili  2.0<H>  /  DBili  x   /  AST  245<H>  /  ALT  137<H>  /  AlkPhos  89        Thyroid Function Tests:    Diet, Consistent Carbohydrate/No Snacks:   DASH/TLC Sodium & Cholesterol Restricted (DASH)  No Concentrated Potassium  Low Sodium (24 @ 11:27) [Active]         Chol 90 Direct LDL -- LDL calculated 36 HDL 40<L> Trig 61,  Chol 101 Direct LDL -- LDL calculated 44 HDL 44 Trig 57  A1C with Estimated Average Glucose Result: 6.7 % (24 @ 02:59)  A1C with Estimated Average Glucose Result: 6.6 % (23 @ 05:27)  A1C with Estimated Average Glucose Result: 8.4 % (23 @ 07:28)  A1C with Estimated Average Glucose Result: 8.2 % (23 @ 12:15)                     HPI:  70 y/o male with hx of CAD s/p multiple  stents (most recent to pLM (70%), pLAD, mLAD on 24 with Dr. Dominguez), HFrEF (EF~50% as of 23), A-fib, HTN, HLD, T2DM, CKD3,  former smoker (2024 11:23)      Patient has history of diabetes, A1C 6.7 % on home Semglee insulin and Farxiga   Endo was consulted for glycemic control.      PAST MEDICAL & SURGICAL HISTORY:  Former smoker      CAD in native artery      Type 2 diabetes mellitus      Hyperlipidemia, unspecified hyperlipidemia type      Essential hypertension, hypertension with unspecified goal      Afib      Hematoma of leg      Stented coronary artery      CHF (congestive heart failure)      s/p Carotid Endarterectomy  left          FAMILY HISTORY:  Family history of heart disease  father  age 71    FH: atrial fibrillation  sister        Social History:            HOME MEDICATIONS:  Home Medications:  acetaminophen 325 mg oral tablet: 2 tab(s) orally every 6 hours As needed Temp greater or equal to 38C (100.4F), Moderate Pain (4 - 6) (2024 09:02)  atorvastatin 80 mg oral tablet: 1 tab(s) orally once a day (at bedtime) (2024 09:02)  clopidogrel 75 mg oral tablet: 1 tab(s) orally once a day (2024 09:02)  Entresto 49 mg-51 mg oral tablet: 1 tab(s) orally 2 times a day (2024 09:02)  ezetimibe 10 mg oral tablet: 1 tab(s) orally once a day (2024 09:02)  Farxiga 10 mg oral tablet: 1 tab(s) orally once a day (2024 09:02)  furosemide 40 mg oral tablet: 1 tab(s) orally once a day (2024 09:02)  multivitamin gummies: 1 gummy orally once a day (2024 09:02)  Semglee (Prefilled Pen) 100 units/mL subcutaneous solution: 28 unit(s) subcutaneous once a day (at bedtime) (2024 09:02)            MEDICATIONS  (STANDING):  aspirin enteric coated 81 milliGRAM(s) Oral daily  atorvastatin 80 milliGRAM(s) Oral at bedtime  clopidogrel Tablet 75 milliGRAM(s) Oral daily  dextrose 5%. 1000 milliLiter(s) (50 mL/Hr) IV Continuous <Continuous>  dextrose 5%. 1000 milliLiter(s) (100 mL/Hr) IV Continuous <Continuous>  dextrose 50% Injectable 12.5 Gram(s) IV Push once  dextrose 50% Injectable 25 Gram(s) IV Push once  dextrose 50% Injectable 25 Gram(s) IV Push once  glucagon  Injectable 1 milliGRAM(s) IntraMuscular once  heparin  Infusion.  Unit(s)/Hr (7 mL/Hr) IV Continuous <Continuous>  insulin glargine Injectable (LANTUS) 15 Unit(s) SubCutaneous at bedtime  insulin lispro (ADMELOG) corrective regimen sliding scale   SubCutaneous at bedtime  insulin lispro (ADMELOG) corrective regimen sliding scale   SubCutaneous three times a day before meals  insulin lispro Injectable (ADMELOG) 5 Unit(s) SubCutaneous three times a day before meals  isosorbide   mononitrate ER Tablet (IMDUR) 30 milliGRAM(s) Oral daily  metoprolol tartrate 25 milliGRAM(s) Oral two times a day    MEDICATIONS  (PRN):  acetaminophen     Tablet .. 650 milliGRAM(s) Oral every 6 hours PRN Mild Pain (1 - 3), Moderate Pain (4 - 6)  dextrose Oral Gel 15 Gram(s) Oral once PRN Blood Glucose LESS THAN 70 milliGRAM(s)/deciliter  heparin   Injectable 3500 Unit(s) IV Push every 6 hours PRN For aPTT less than 40  nitroglycerin     SubLingual 0.4 milliGRAM(s) SubLingual every 5 minutes PRN Chest Pain      Allergies    No Known Allergies    Intolerances        Review of Systems:  Neuro: No HA, no dizziness  Cardiovascular: No chest pain, no palpitations  Respiratory: no SOB, no cough  GI: No nausea, vomiting, abdominal pain  MSK: Denies joint/muscle pain      ALL OTHER SYSTEMS REVIEWED AND NEGATIVE        PHYSICAL EXAM:  VITALS: T(C): 36.4 (24 @ 05:41)  T(F): 97.6 (24 @ 05:41), Max: 98.2 (24 @ 02:57)  HR: 111 (24 @ 12:32) (86 - 145)  BP: 115/72 (24 @ 12:32) (107/56 - 164/102)  RR:  (16 - 18)  SpO2:  (94% - 100%)  Wt(kg): --  GENERAL: NAD, well-groomed, well-developed  NEURO:  alert and oriented  RESPIRATORY: Clear to auscultation bilaterally; No rales, rhonchi, wheezing  CARDIOVASCULAR: Si S2  GI: Soft, non distended, normal bowel sounds  MUSCULOSKELETAL: Moves all extremities equally       POCT Blood Glucose.: 267 mg/dL (24 @ 12:15)                            10.8   11.83 )-----------( 249      ( 2024 03:06 )             32.1           140  |  103  |  64<H>  ----------------------------<  176<H>  5.0   |  19<L>  |  2.43<H>    eGFR: 28<L>    Ca    10.2        Mg     2.6         TPro  7.0  /  Alb  4.7  /  TBili  2.0<H>  /  DBili  x   /  AST  245<H>  /  ALT  137<H>  /  AlkPhos  89        Thyroid Function Tests:    Diet, Consistent Carbohydrate/No Snacks:   DASH/TLC Sodium & Cholesterol Restricted (DASH)  No Concentrated Potassium  Low Sodium (24 @ 11:27) [Active]         Chol 90 Direct LDL -- LDL calculated 36 HDL 40<L> Trig 61,  Chol 101 Direct LDL -- LDL calculated 44 HDL 44 Trig 57  A1C with Estimated Average Glucose Result: 6.7 % (24 @ 02:59)  A1C with Estimated Average Glucose Result: 6.6 % (23 @ 05:27)  A1C with Estimated Average Glucose Result: 8.4 % (23 @ 07:28)  A1C with Estimated Average Glucose Result: 8.2 % (23 @ 12:15)

## 2024-02-09 NOTE — ED ADULT NURSE NOTE - NSFALLUNIVINTERV_ED_ALL_ED
Bed/Stretcher in lowest position, wheels locked, appropriate side rails in place/Call bell, personal items and telephone in reach/Instruct patient to call for assistance before getting out of bed/chair/stretcher/Non-slip footwear applied when patient is off stretcher/[place] to call system/Physically safe environment - no spills, clutter or unnecessary equipment/Purposeful proactive rounding/Room/bathroom lighting operational, light cord in reach

## 2024-02-09 NOTE — CONSULT NOTE ADULT - ASSESSMENT
68 y/o male with hx of CAD s/p multiple  stents (most recent to pLM (70%), pLAD, mLAD on 1/8/24 with Dr. Dominguez), HFrEF , A-fib, HTN, HLD, T2DM, CKD3, former smoker    Assessment  DMT2: 69y Male with DM T2 with hyperglycemia, A1C 6.7% , was on oral meds and insulin at home (Farxiga and Semglee basal insulin), glucose elevated, eating meals.   CAD: on medications, stable, monitored. CTS eval recent stent.   HTN: on antihypertensive medications, monitored, asymptomatic.  CKD: Monitor labs/BMP,         Discussed plan and management wit Dr Flo Jamil NP-TEAMS  Myrna Rossi MD  Cell: [phone]  Office: [phone]             68 y/o male with hx of CAD s/p multiple  stents (most recent to pLM (70%), pLAD, mLAD on 1/8/24 with Dr. Dominguez), HFrEF , A-fib, HTN, HLD, T2DM, CKD3, former smoker    Assessment  DMT2: 69y Male with DM T2 with hyperglycemia, A1C 6.7% , was on oral meds and insulin at home (Farxiga and Semglee basal insulin), glucose  elevated, eating meals.   CAD: on medications, stable, monitored. CTS eval recent stent.   HTN: on antihypertensive medications, monitored, asymptomatic.  CKD: Monitor labs/BMP,         Discussed plan and management wit Dr Flo Jamil NP-TEAMS  Myrna Rossi MD  Cell: [phone]  Office: [phone]

## 2024-02-09 NOTE — ED ADULT NURSE NOTE - OBJECTIVE STATEMENT
Patient is a 69 year old male complaining of chest pain. Patient reports having a chest pain today went to an outside hospital where they did an EKG and basic labs - patient requested sublingual nitro there which they did not give him so he left AMA. On assessment patient is A&Ox4, ambulating independently, Breathing comfortably, no accessory muscle use, no cough, chest rise and fall equal, on room air, a-fib on the cardiac monitor, no JVD, no edema noted, strong bilateral peripheral pulses, abdomen soft nontender, skin warm and normal for race. PMH CAD, CHF, A-fib, former smoker hyperlipidemia with 9 stented coronary arteries, type 2 diabetes.

## 2024-02-09 NOTE — H&P ADULT - HISTORY OF PRESENT ILLNESS
68 y/o male with hx of CAD s/p multiple  stents (most recent to pLM (70%), pLAD, mLAD on 1/8/24 with Dr. Dominguez), HFrEF (EF~50% as of 11/26/23), A-fib, HTN, HLD, T2DM, CKD3, former smoker 68 y/o male with hx of CAD s/p multiple  stents (most recent to pLM (70%), pLAD, mLAD on 1/8/24 with Dr. Dominguez), HFrEF (EF~50% as of 11/26/23), A-fib, HTN, HLD, T2DM, CKD3, former smoker admitted with recurrent cp similar to his pain from cad ..  denies fever chiills  cough   n/v/d

## 2024-02-09 NOTE — ED ADULT NURSE REASSESSMENT NOTE - NS ED NURSE REASSESS COMMENT FT1
Report given to Holding RN Marissa.  Pt AAOx4, NAD, resp nonlabored, resting comfortably in bed. Pt denies headache, dizziness, chest pain, palpitations, SOB, weakness at this time.  Patient in stable condition. Pt aware of RN re-assignment and that they are still awaiting bed upstairs.

## 2024-02-09 NOTE — ED PROVIDER NOTE - ATTENDING CONTRIBUTION TO CARE
Attending note (Enmanuel): 69-year-old male history of CAD (9 stents) CHF A-fib on Eliquis HTN HLD DM 2 presenting with chest pain intermittent not associated with exertion but does improve with rest.  Recent catheter via catheterization revealing stenosis 70 send some areas up to 100% on right side.  Patient seen in outside hospital because the chest pain and had been recommended to be admitted but left AMA to come here as follows here.  Patient appears in no acute distress speaking clearly in full sentences.  Cardiac rate tachycardic 120s irregular.  Hypertensive.  Lungs clear bilaterally.  Abdomen soft nontender.  Nonpitting peripheral edema in bilateral lower extremities.  ECG shows right bundle branch block but no STEMI however A-fib and RVR with rate 120s.  Patient improving now with rest and 1 sublingual nitroglycerin.  Labs from outside hospital provided by patient where is noted to have a high-sensitivity troponin of 150 in setting of a creatinine of 2.90.  History of CKD with baseline creatinine 1.8-2's.  Will obtain screening labs including CBC to evaluate for anemia CMP to evaluate for electrolyte abnormalities or worsening renal function and troponin to trend for possible NSTEMI/ACS.  Obtain screening chest x-ray.  Consult with cardiology and if troponin increasing anticipate need for admission.

## 2024-02-09 NOTE — ED PROVIDER NOTE - OBJECTIVE STATEMENT
HPI & ROS: 69-year-old male history of CAD, CHF, A-fib, former smoker hyperlipidemia with 9 stented coronary arteries, type 2 diabetes,  coming in with chest pain.  Recently in the hospital approximately 9 days ago where he was cathed with no interventions. Continue to gather no interventions,  large amount of stenosis measuring 70% in some areas, 100% occlusion in the right side of the heart.   Patient went to outside hospital because the chest pain today was just worse.  worse with exercise or movement.  Movement makes it worse.  No radiation.  Describes it as a pressure across the front of his chest.   Patient takes nitroglycerin for significant pain relief.  Patient took no nitro as he ran out today.  Went into the outside hospital, EKG and basic labs were performed.  Troponin of 150, creatinine of 2.9.

## 2024-02-09 NOTE — H&P ADULT - ASSESSMENT
68 y/o male with hx of CAD s/p multiple  stents (most recent to pLM (70%), pLAD, mLAD on 1/8/24 with Dr. Dominguez), HFrEF (EF~50% as of 11/26/23), A-fib, HTN, HLD, T2DM, CKD3, former smoker admitted with recurrent cp similar to his pain from cad ..  denies fever chiills  cough   n/v/d       Angina / NSTEMI : d/w Dr. Rivas and Dr. Deleon   cont current management   consult intreventional and CTS     elevated LFT : monitor on lipitor     BARRY on ckd : hold lasix  faxiga     DM monitor fs

## 2024-02-10 LAB
ALBUMIN SERPL ELPH-MCNC: 3.5 G/DL — SIGNIFICANT CHANGE UP (ref 3.3–5)
ALP SERPL-CCNC: 82 U/L — SIGNIFICANT CHANGE UP (ref 40–120)
ALT FLD-CCNC: 95 U/L — HIGH (ref 10–45)
ANION GAP SERPL CALC-SCNC: 17 MMOL/L — SIGNIFICANT CHANGE UP (ref 5–17)
AST SERPL-CCNC: 138 U/L — HIGH (ref 10–40)
BASOPHILS # BLD AUTO: 0.03 K/UL — SIGNIFICANT CHANGE UP (ref 0–0.2)
BASOPHILS NFR BLD AUTO: 0.3 % — SIGNIFICANT CHANGE UP (ref 0–2)
BILIRUB SERPL-MCNC: 2.2 MG/DL — HIGH (ref 0.2–1.2)
BUN SERPL-MCNC: 73 MG/DL — HIGH (ref 7–23)
CALCIUM SERPL-MCNC: 9.1 MG/DL — SIGNIFICANT CHANGE UP (ref 8.4–10.5)
CHLORIDE SERPL-SCNC: 106 MMOL/L — SIGNIFICANT CHANGE UP (ref 96–108)
CO2 SERPL-SCNC: 14 MMOL/L — LOW (ref 22–31)
CREAT SERPL-MCNC: 2.58 MG/DL — HIGH (ref 0.5–1.3)
EGFR: 26 ML/MIN/1.73M2 — LOW
EOSINOPHIL # BLD AUTO: 0.1 K/UL — SIGNIFICANT CHANGE UP (ref 0–0.5)
EOSINOPHIL NFR BLD AUTO: 1 % — SIGNIFICANT CHANGE UP (ref 0–6)
GLUCOSE BLDC GLUCOMTR-MCNC: 112 MG/DL — HIGH (ref 70–99)
GLUCOSE BLDC GLUCOMTR-MCNC: 132 MG/DL — HIGH (ref 70–99)
GLUCOSE BLDC GLUCOMTR-MCNC: 162 MG/DL — HIGH (ref 70–99)
GLUCOSE BLDC GLUCOMTR-MCNC: 166 MG/DL — HIGH (ref 70–99)
GLUCOSE SERPL-MCNC: 163 MG/DL — HIGH (ref 70–99)
HCT VFR BLD CALC: 26.9 % — LOW (ref 39–50)
HCT VFR BLD CALC: 32 % — LOW (ref 39–50)
HGB BLD-MCNC: 10.8 G/DL — LOW (ref 13–17)
HGB BLD-MCNC: 9 G/DL — LOW (ref 13–17)
IMM GRANULOCYTES NFR BLD AUTO: 0.4 % — SIGNIFICANT CHANGE UP (ref 0–0.9)
LYMPHOCYTES # BLD AUTO: 1.08 K/UL — SIGNIFICANT CHANGE UP (ref 1–3.3)
LYMPHOCYTES # BLD AUTO: 11.1 % — LOW (ref 13–44)
MCHC RBC-ENTMCNC: 31.7 PG — SIGNIFICANT CHANGE UP (ref 27–34)
MCHC RBC-ENTMCNC: 31.9 PG — SIGNIFICANT CHANGE UP (ref 27–34)
MCHC RBC-ENTMCNC: 33.5 GM/DL — SIGNIFICANT CHANGE UP (ref 32–36)
MCHC RBC-ENTMCNC: 33.8 GM/DL — SIGNIFICANT CHANGE UP (ref 32–36)
MCV RBC AUTO: 94.4 FL — SIGNIFICANT CHANGE UP (ref 80–100)
MCV RBC AUTO: 94.7 FL — SIGNIFICANT CHANGE UP (ref 80–100)
MONOCYTES # BLD AUTO: 0.78 K/UL — SIGNIFICANT CHANGE UP (ref 0–0.9)
MONOCYTES NFR BLD AUTO: 8 % — SIGNIFICANT CHANGE UP (ref 2–14)
NEUTROPHILS # BLD AUTO: 7.71 K/UL — HIGH (ref 1.8–7.4)
NEUTROPHILS NFR BLD AUTO: 79.2 % — HIGH (ref 43–77)
NRBC # BLD: 0 /100 WBCS — SIGNIFICANT CHANGE UP (ref 0–0)
NRBC # BLD: 0 /100 WBCS — SIGNIFICANT CHANGE UP (ref 0–0)
PLATELET # BLD AUTO: 179 K/UL — SIGNIFICANT CHANGE UP (ref 150–400)
PLATELET # BLD AUTO: 195 K/UL — SIGNIFICANT CHANGE UP (ref 150–400)
POTASSIUM SERPL-MCNC: 5.3 MMOL/L — SIGNIFICANT CHANGE UP (ref 3.5–5.3)
POTASSIUM SERPL-SCNC: 5.3 MMOL/L — SIGNIFICANT CHANGE UP (ref 3.5–5.3)
PROT SERPL-MCNC: 5.4 G/DL — LOW (ref 6–8.3)
RBC # BLD: 2.84 M/UL — LOW (ref 4.2–5.8)
RBC # BLD: 3.39 M/UL — LOW (ref 4.2–5.8)
RBC # FLD: 14.1 % — SIGNIFICANT CHANGE UP (ref 10.3–14.5)
RBC # FLD: 14.6 % — HIGH (ref 10.3–14.5)
SODIUM SERPL-SCNC: 137 MMOL/L — SIGNIFICANT CHANGE UP (ref 135–145)
T4 FREE SERPL-MCNC: 1.1 NG/DL — SIGNIFICANT CHANGE UP (ref 0.9–1.8)
TSH SERPL-MCNC: 1.76 UIU/ML — SIGNIFICANT CHANGE UP (ref 0.27–4.2)
WBC # BLD: 12.7 K/UL — HIGH (ref 3.8–10.5)
WBC # BLD: 9.74 K/UL — SIGNIFICANT CHANGE UP (ref 3.8–10.5)
WBC # FLD AUTO: 12.7 K/UL — HIGH (ref 3.8–10.5)
WBC # FLD AUTO: 9.74 K/UL — SIGNIFICANT CHANGE UP (ref 3.8–10.5)

## 2024-02-10 RX ORDER — ISOSORBIDE DINITRATE 5 MG/1
20 TABLET ORAL THREE TIMES A DAY
Refills: 0 | Status: DISCONTINUED | OUTPATIENT
Start: 2024-02-10 | End: 2024-02-15

## 2024-02-10 RX ORDER — METOPROLOL TARTRATE 50 MG
25 TABLET ORAL
Refills: 0 | Status: DISCONTINUED | OUTPATIENT
Start: 2024-02-10 | End: 2024-02-15

## 2024-02-10 RX ADMIN — Medication 25 MILLIGRAM(S): at 05:36

## 2024-02-10 RX ADMIN — CLOPIDOGREL BISULFATE 75 MILLIGRAM(S): 75 TABLET, FILM COATED ORAL at 12:56

## 2024-02-10 RX ADMIN — Medication 81 MILLIGRAM(S): at 12:59

## 2024-02-10 RX ADMIN — ATORVASTATIN CALCIUM 80 MILLIGRAM(S): 80 TABLET, FILM COATED ORAL at 22:24

## 2024-02-10 RX ADMIN — Medication 1: at 08:04

## 2024-02-10 RX ADMIN — ISOSORBIDE DINITRATE 20 MILLIGRAM(S): 5 TABLET ORAL at 05:36

## 2024-02-10 RX ADMIN — ISOSORBIDE DINITRATE 20 MILLIGRAM(S): 5 TABLET ORAL at 17:22

## 2024-02-10 RX ADMIN — PANTOPRAZOLE SODIUM 10 MG/HR: 20 TABLET, DELAYED RELEASE ORAL at 11:40

## 2024-02-10 RX ADMIN — INSULIN GLARGINE 15 UNIT(S): 100 INJECTION, SOLUTION SUBCUTANEOUS at 22:24

## 2024-02-10 RX ADMIN — Medication 25 MILLIGRAM(S): at 17:24

## 2024-02-10 RX ADMIN — PANTOPRAZOLE SODIUM 10 MG/HR: 20 TABLET, DELAYED RELEASE ORAL at 22:24

## 2024-02-10 NOTE — PROGRESS NOTE ADULT - ASSESSMENT
69 year-old man with history of multiple medical issues including hypertension, diabetes mellitus, coronary artery disease, atrial fibrillation, and chronic kidney disease. He presented overnight to an outside hospital with acute-onset chest pain waking him up at 4a.m; he left AMA from the ER and came to the [facility] ER so that he could be treated by his own doctors. He is s/p multiple admissions within the past month at [facility] for chest pain/NSTEMI. He was admitted at [facility] from 1/5-1/9/24 after presenting with chest pain and ruling in for NSTEMI. He underwent cardiac cath 1/8/24 with Dr. Julito Dominguez; drug eluting stents were deployed at sites of his 80% proximal LAD lesion, 80% mid-LAD lesion, and 70% proximal left main lesion. he came to the ER on Sunday 1/28/24 after his cardiac enzymes from 1/26/24 returned elevated. He underwent cardiac cath 1/29/24; his stents were patent. HE returns with CP   - D/W Dr Dominguez personally, advises No cath at present time  - Appreciate Dr Kelton Nunn's consult - feels open heart surgery is not an option for him and wouldf not be helpful nor indicated at present time  - Entresto Farxiga is on hold  - Cont antianginal therapy - nitrates and BB  - melenic stool - transfused PRBC.   - cont DAPT as cleared by GI given benefits outweigh risk  - consider Ranexa if renal function improves   - trend H/H  - appreciate multiple consultants f/u  - pt and family updated

## 2024-02-10 NOTE — PROGRESS NOTE ADULT - SUBJECTIVE AND OBJECTIVE BOX
CHIEF COMPLAINT: Chest Pain    HPI:  68 y/o male with hx of CAD s/p multiple  stents (most recent to pLM (70%), pLAD, mLAD on 24 with Dr. Dominguez), HFrEF (EF~50% as of 23), A-fib, HTN, HLD, T2DM, CKD3, former smoker admitted with recurrent cp similar to his pain from cad, denies fever chiills, cough , n/v/d ,  (2024 11:23)  Today no CP no SOB       PAST MEDICAL & SURGICAL HISTORY:  Former smoker      CAD in native artery      Type 2 diabetes mellitus      Hyperlipidemia, unspecified hyperlipidemia type      Essential hypertension, hypertension with unspecified goal      Afib      Hematoma of leg      Stented coronary artery      CHF (congestive heart failure)      s/p Carotid Endarterectomy  left          Allergies    No Known Allergies    Intolerances        SOCIAL HISTORY    Smoking Hx:  ETOH Hx:  Marital Status:  Occupational Hx:    FAMILY HISTORY:  Family history of heart disease  father  age 71    FH: atrial fibrillation  sister        MEDICATIONS:  acetaminophen     Tablet .. 650 milliGRAM(s) Oral every 6 hours PRN  acetaminophen   IVPB .. 1000 milliGRAM(s) IV Intermittent once  aspirin enteric coated 81 milliGRAM(s) Oral daily  atorvastatin 80 milliGRAM(s) Oral at bedtime  clopidogrel Tablet 75 milliGRAM(s) Oral daily  dextrose 5%. 1000 milliLiter(s) IV Continuous <Continuous>  dextrose 5%. 1000 milliLiter(s) IV Continuous <Continuous>  dextrose 50% Injectable 25 Gram(s) IV Push once  dextrose 50% Injectable 25 Gram(s) IV Push once  dextrose 50% Injectable 12.5 Gram(s) IV Push once  dextrose Oral Gel 15 Gram(s) Oral once PRN  glucagon  Injectable 1 milliGRAM(s) IntraMuscular once  insulin glargine Injectable (LANTUS) 15 Unit(s) SubCutaneous at bedtime  insulin lispro (ADMELOG) corrective regimen sliding scale   SubCutaneous three times a day before meals  isosorbide   dinitrate Tablet (ISORDIL) 20 milliGRAM(s) Oral three times a day  metoprolol tartrate 25 milliGRAM(s) Oral two times a day  pantoprazole  Injectable 80 milliGRAM(s) IV Push once  pantoprazole Infusion 8 mG/Hr IV Continuous <Continuous>      REVIEW OF SYSTEMS:    CONSTITUTIONAL: No weakness, fevers or chills  EYES/ENT: No visual changes;  No vertigo or throat pain   NECK: No pain or stiffness  RESPIRATORY: No cough, wheezing, hemoptysis; No shortness of breath  CARDIOVASCULAR: No chest pain or palpitations  GASTROINTESTINAL: No abdominal or epigastric pain. No nausea, vomiting, or hematemesis; No diarrhea or constipation. No melena or hematochezia.  GENITOURINARY: No dysuria, frequency or hematuria  NEUROLOGICAL: No numbness or weakness  SKIN: No itching, burning, rashes, or lesions   All other review of systems is negative unless indicated above    Vital Signs Last 24 Hrs  T(C): 37.1 (10 Feb 2024 11:29), Max: 37.1 (10 Feb 2024 11:29)  T(F): 98.7 (10 Feb 2024 11:), Max: 98.7 (10 Feb 2024 11:)  HR: 87 (10 Feb 2024 11:) (82 - 123)  BP: 105/68 (10 Feb 2024 11:) (97/63 - 144/78)  BP(mean): --  RR: 18 (10 Feb 2024 11:29) (18 - 18)  SpO2: 98% (10 Feb 2024 11:29) (94% - 100%)    Parameters below as of 10 Feb 2024 11:29  Patient On (Oxygen Delivery Method): room air        I&O's Summary    10 Feb 2024 07:01  -  10 Feb 2024 16:47  --------------------------------------------------------  IN: 0 mL / OUT: 600 mL / NET: -600 mL        PHYSICAL EXAM:    Constitutional: NAD, awake and alert, well-developed  HEENT: PERR, EOMI  Neck: soft and supple, No LAD, No JVD  Respiratory: Breath sounds are clear bilaterally, No wheezing, rales or rhonchi  Cardiovascular: Regular rate and rhythm, normal S1 and S2,  no murmurs, gallops or rubs  Gastrointestinal: Bowel Sounds present, soft, nontender.   Extremities: No peripheral edema. No clubbing or cyanosis.  Vascular: 2+ peripheral pulses  Neurological: A/O x 3, no focal deficits  Musculoskeletal: no calf tenderness.  Skin: No rashes.      LABS: All Labs Reviewed:                        10.8   12.70 )-----------( 195      ( 10 Feb 2024 14:00 )             32.0                         9.0    9.74  )-----------( 179      ( 10 Feb 2024 07:24 )             26.9                         8.3    9.92  )-----------( 204      ( 2024 20:00 )             25.2     10 Feb 2024 07:24    137    |  106    |  73     ----------------------------<  163    5.3     |  14     |  2.58   2024 19:52    135    |  101    |  73     ----------------------------<  153    4.7     |  21     |  2.75   2024 03:06    140    |  103    |  64     ----------------------------<  176    5.0     |  19     |  2.43     Ca    9.1        10 Feb 2024 07:24  Ca    9.7        2024 19:52  Ca    10.2       2024 03:06  Mg     2.6       2024 03:06    TPro  5.4    /  Alb  3.5    /  TBili  2.2    /  DBili  x      /  AST  138    /  ALT  95     /  AlkPhos  82     10 Feb 2024 07:24  TPro  7.0    /  Alb  4.7    /  TBili  2.0    /  DBili  x      /  AST  245    /  ALT  137    /  AlkPhos  89     2024 03:06    PT/INR - ( 2024 19:52 )   PT: 18.2 sec;   INR: 1.68 ratio         PTT - ( 2024 19:52 )  PTT:51.2 sec  Blood Culture:         TSH: Thyroid Stimulating Hormone, Serum: 1.76 uIU/mL (02-10 @ 07:24)

## 2024-02-10 NOTE — PROGRESS NOTE ADULT - ASSESSMENT
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  (2)BARRY - likely prerenal in setting of Lasix, Farxiga, and Entresto. We can hold these meds for now, such that the renal parameters improve over the next few days, in case cardiac cath and/or OHS is needed  (3)Metabolic acidosis - increased anion gap - mild lactic acidosis from MI  (4)Chest pain - recurrent chest pain from recurrent small NSTEMIs.   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  (2)BARRY - likely prerenal in setting of Lasix, Farxiga, and Entresto. We can hold these meds for now, such that the renal parameters improve over the next few days, in case cardiac cath and/or OHS is needed  (3)Metabolic acidosis - increased anion gap - mild lactic acidosis from MI  (4)Chest pain - recurrent chest pain from recurrent small NSTEMIs.   (5)CTS - not a surgical candidate, recommend medical therapy   (6)GI - s/p PRBC yesterday, diet restarted     RECOMMEND:  (1)Continue to hold Entresto and Farxiga   (2)No standing Lasix for now; can give intermittent doses prn for dyspnea  (3)If for cath, would give NS 100cc/h x 5h periprocedurally to minimize DENNIS risk  (4)NaHCO3 650 po bid  (5)BMP daily  (6)Dose new meds for GFR 20-30ml/min    D/w Dr. Myrna Foy, [facility]  [phone]

## 2024-02-10 NOTE — PROGRESS NOTE ADULT - SUBJECTIVE AND OBJECTIVE BOX
Date of service: 02-10-24 @ 23:04      Patient is a 69y old  Male who presents with a chief complaint of CP (10 Feb 2024 16:45)                                                               INTERVAL HPI/OVERNIGHT EVENTS:    REVIEW OF SYSTEMS:     no cp   no melena today   no bm today                                                                                                                                                                                                                                                                 Medications:  MEDICATIONS  (STANDING):  acetaminophen   IVPB .. 1000 milliGRAM(s) IV Intermittent once  aspirin enteric coated 81 milliGRAM(s) Oral daily  atorvastatin 80 milliGRAM(s) Oral at bedtime  clopidogrel Tablet 75 milliGRAM(s) Oral daily  dextrose 5%. 1000 milliLiter(s) (50 mL/Hr) IV Continuous <Continuous>  dextrose 5%. 1000 milliLiter(s) (100 mL/Hr) IV Continuous <Continuous>  dextrose 50% Injectable 12.5 Gram(s) IV Push once  dextrose 50% Injectable 25 Gram(s) IV Push once  dextrose 50% Injectable 25 Gram(s) IV Push once  glucagon  Injectable 1 milliGRAM(s) IntraMuscular once  insulin glargine Injectable (LANTUS) 15 Unit(s) SubCutaneous at bedtime  insulin lispro (ADMELOG) corrective regimen sliding scale   SubCutaneous three times a day before meals  isosorbide   dinitrate Tablet (ISORDIL) 20 milliGRAM(s) Oral three times a day  metoprolol tartrate 25 milliGRAM(s) Oral two times a day  pantoprazole  Injectable 80 milliGRAM(s) IV Push once  pantoprazole Infusion 8 mG/Hr (10 mL/Hr) IV Continuous <Continuous>    MEDICATIONS  (PRN):  acetaminophen     Tablet .. 650 milliGRAM(s) Oral every 6 hours PRN Mild Pain (1 - 3), Moderate Pain (4 - 6)  dextrose Oral Gel 15 Gram(s) Oral once PRN Blood Glucose LESS THAN 70 milliGRAM(s)/deciliter       Allergies    No Known Allergies    Intolerances      Vital Signs Last 24 Hrs  T(C): 36.6 (10 Feb 2024 19:40), Max: 37.1 (10 Feb 2024 11:29)  T(F): 97.8 (10 Feb 2024 19:40), Max: 98.7 (10 Feb 2024 11:29)  HR: 88 (10 Feb 2024 19:40) (82 - 123)  BP: 116/65 (10 Feb 2024 19:40) (105/68 - 144/78)  BP(mean): --  RR: 18 (10 Feb 2024 19:40) (18 - 18)  SpO2: 99% (10 Feb 2024 19:40) (94% - 100%)    Parameters below as of 10 Feb 2024 19:40  Patient On (Oxygen Delivery Method): room air      CAPILLARY BLOOD GLUCOSE      POCT Blood Glucose.: 162 mg/dL (10 Feb 2024 21:55)  POCT Blood Glucose.: 112 mg/dL (10 Feb 2024 17:50)  POCT Blood Glucose.: 132 mg/dL (10 Feb 2024 11:04)  POCT Blood Glucose.: 166 mg/dL (10 Feb 2024 07:39)      02-10 @ 07:01  -  02-10 @ 23:04  --------------------------------------------------------  IN: 0 mL / OUT: 600 mL / NET: -600 mL      Physical Exam:    Daily     Daily   General:  Well appearing, NAD, not cachetic  HEENT:  Nonicteric, PERRLA  CV:  RRR, S1S2   Lungs:  CTA B/L, no wheezes, rales, rhonchi  Abdomen:  Soft, non-tender, no distended, positive BS  Extremities:  2+ pulses, no c/c, no edema  Skin:  Warm and dry, no rashes  :  No moreno  Neuro:  AAOx3, non-focal, grossly intact                                                                                                                                                                                                                                                                                                LABS:                               10.8   12.70 )-----------( 195      ( 10 Feb 2024 14:00 )             32.0                      02-10    137  |  106  |  73<H>  ----------------------------<  163<H>  5.3   |  14<L>  |  2.58<H>    Ca    9.1      10 Feb 2024 07:24  Mg     2.6     02-09    TPro  5.4<L>  /  Alb  3.5  /  TBili  2.2<H>  /  DBili  x   /  AST  138<H>  /  ALT  95<H>  /  AlkPhos  82  02-10                       RADIOLOGY & ADDITIONAL TESTS         I personally reviewed: [  ]EKG   [  ]CXR    [  ] CT      A/P:         Discussed with :     Derek consultants' Notes   Time spent :

## 2024-02-10 NOTE — PROGRESS NOTE ADULT - ASSESSMENT
70 y/o male with hx of CAD s/p multiple  stents (most recent to pLM (70%), pLAD, mLAD on 1/8/24 with Dr. Dominguez), HFrEF (EF~50% as of 11/26/23), A-fib, HTN, HLD, T2DM, CKD3, former smoker admitted with recurrent cp similar to his pain from cad ..  denies fever chiills  cough   n/v/d       Angina / NSTEMI : d/w Dr. Rivas and Dr. Deleon   cont current management   consult intreventional and CTS :  cont medical management     acute anemia sec to acute GIB:  ppi   s/p transfusion   fu w GI     elevated LFT : monitor on lipitor     BARRY on ckd : hold lasix  faxiga     DM monitor fs

## 2024-02-10 NOTE — CONSULT NOTE ADULT - SUBJECTIVE AND OBJECTIVE BOX
HISTORY OF PRESENT ILLNESS: 70 y/o male with hx of CAD s/p multiple  stents (most recent to pLM (70%), pLAD, mLAD on 24 with Dr. Dominguez), HFrEF (EF~50% as of 23), A-fib, HTN, HLD, T2DM, CKD3, former smoker admitted with recurrent cp similar to his pain from cad ..  denies fever chiills  cough   n/v/d     last night with dark bm , h/H  s/p PRBC   GI consulted for GI bleed     PAST MEDICAL & SURGICAL HISTORY:  Former smoker      CAD in native artery      Type 2 diabetes mellitus      Hyperlipidemia, unspecified hyperlipidemia type      Essential hypertension, hypertension with unspecified goal      Afib      Hematoma of leg      Stented coronary artery      CHF (congestive heart failure)      s/p Carotid Endarterectomy  left          [ ] Diabetes   [ ] Hypertension  [ ] Hyperlipidemia  [ ] CAD  [ ] PCI  [ ] CABG    PREVIOUS DIAGNOSTIC TESTING:    [ ] Echocardiogram:  [ ]  Catheterization:  [ ] Stress Test:  	    MEDICATIONS:  aspirin enteric coated 81 milliGRAM(s) Oral daily  clopidogrel Tablet 75 milliGRAM(s) Oral daily  isosorbide   dinitrate Tablet (ISORDIL) 20 milliGRAM(s) Oral three times a day  metoprolol tartrate 25 milliGRAM(s) Oral two times a day        acetaminophen     Tablet .. 650 milliGRAM(s) Oral every 6 hours PRN  acetaminophen   IVPB .. 1000 milliGRAM(s) IV Intermittent once    pantoprazole  Injectable 80 milliGRAM(s) IV Push once  pantoprazole Infusion 8 mG/Hr IV Continuous <Continuous>    atorvastatin 80 milliGRAM(s) Oral at bedtime  dextrose 50% Injectable 25 Gram(s) IV Push once  dextrose 50% Injectable 25 Gram(s) IV Push once  dextrose 50% Injectable 12.5 Gram(s) IV Push once  dextrose Oral Gel 15 Gram(s) Oral once PRN  glucagon  Injectable 1 milliGRAM(s) IntraMuscular once  insulin glargine Injectable (LANTUS) 15 Unit(s) SubCutaneous at bedtime  insulin lispro (ADMELOG) corrective regimen sliding scale   SubCutaneous three times a day before meals    dextrose 5%. 1000 milliLiter(s) IV Continuous <Continuous>  dextrose 5%. 1000 milliLiter(s) IV Continuous <Continuous>      Allergies    No Known Allergies    Intolerances        FAMILY HISTORY:  Family history of heart disease  father  age 71    FH: atrial fibrillation  sister        SOCIAL HISTORY:    [ ] Non-smoker  [ ] Smoker  [ ] Alcohol      REVIEW OF SYSTEMS:  [ ]chest pain  [  ]shortness of breath  [  ]palpitations  [  ]syncope  [ ]near syncope [  ]diplopia  [  ]altered mental status   [  ]fevers  [ ]chills [ ]nausea  [ ]vomitting  [ ]abdominal pain  [ ]melena  [ ]BRBPR  [  ]epistaxis  [  ]rash  [  ]lower extremity edema      CONSTITUTIONAL: No fever, weight loss, or fatigue  EYES: No eye pain, visual disturbances, or discharge  ENMT:  No difficulty hearing, tinnitus, vertigo; No sinus or throat pain  NECK: No pain or stiffness  RESPIRATORY: No cough, wheezing, chills or hemoptysis; No Shortness of Breath  CARDIOVASCULAR: ++ chest pain,   GASTROINTESTINAL: No abdominal or epigastric pain. No nausea, vomiting, or hematemesis; No diarrhea or constipation. No melena or hematochezia.  GENITOURINARY: No dysuria, frequency, hematuria, or incontinence  NEUROLOGICAL: No headaches, memory loss, loss of strength, numbness, or tremors  SKIN: No itching, burning, rashes, or lesions   LYMPH Nodes: No enlarged glands  ENDOCRINE: No heat or cold intolerance; No hair loss  MUSCULOSKELETAL: No joint pain or swelling; No muscle, back, or extremity pain  PSYCHIATRIC: No depression, anxiety, mood swings, or difficulty sleeping  HEME/LYMPH: No easy bruising, or bleeding gums  ALLERY AND IMMUNOLOGIC: No hives or eczema	    [ ] All others negative	  [ ] Unable to obtain    PHYSICAL EXAM:  T(C): 36.9 (02-10-24 @ 05:08), Max: 36.9 (02-10-24 @ 05:08)  HR: 82 (02-10-24 @ 05:08) (82 - 123)  BP: 110/68 (02-10-24 @ 05:08) (97/63 - 144/78)  RR: 18 (02-10-24 @ 05:08) (18 - 18)  SpO2: 94% (02-10-24 @ 05:08) (94% - 100%)  Wt(kg): --  I&O's Summary      Appearance: Normal	  HEENT:   Normal oral mucosa, PERRL, EOMI	  Lymphatic: No lymphadenopathy  Cardiovascular: Normal S1 S2, No JVD, No murmurs, No edema  Respiratory: Lungs clear to auscultation	  Psychiatry: A & O x 3, Mood & affect appropriate  Gastrointestinal:  Soft, Non-tender, + BS	  Skin: No rashes, No ecchymoses, No cyanosis	  Neurologic: Non-focal  Extremities: Normal range of motion, No clubbing, cyanosis or edema  Vascular: Peripheral pulses palpable 2+ bilaterally    TELEMETRY: 	nsr     ECG:  	  RADIOLOGY:  OTHER: 	  	  LABS:	 	    CARDIAC MARKERS:                                  9.0    9.74  )-----------( 179      ( 10 Feb 2024 07:24 )             26.9     02-10    137  |  106  |  73<H>  ----------------------------<  163<H>  5.3   |  14<L>  |  2.58<H>    Ca    9.1      10 Feb 2024 07:24  Mg     2.6     -09    TPro  5.4<L>  /  Alb  3.5  /  TBili  2.2<H>  /  DBili  x   /  AST  138<H>  /  ALT  95<H>  /  AlkPhos  82  02-10    proBNP:   Lipid Profile:   HgA1c:   TSH: Thyroid Stimulating Hormone, Serum: 1.76 uIU/mL (02-10 @ 07:24)      ASSESSMENT/PLAN: 	70 y/o male with hx of CAD s/p multiple  stents (most recent to pLM (70%), pLAD, mLAD on 24 with Dr. Dominguez), HFrEF (EF~50% as of 23), A-fib, HTN, HLD, T2DM, CKD3, former smoker admitted with recurrent cp similar to his pain from cad .  Last night with dark stool , CBC with low H/H , s/p PRBC    trend h/h  cont protonix gtt  CAD noted ,  cont DAPT at present , benefit outweights risk  Creat noted  D/W Dr Parikh         HISTORY OF PRESENT ILLNESS: 70 y/o male with hx of CAD s/p multiple  stents (most recent to pLM (70%), pLAD, mLAD on 24 with Dr. Dominguez), HFrEF (EF~50% as of 23), A-fib, HTN, HLD, T2DM, CKD3, former smoker admitted with recurrent cp similar to his pain from cad ..  denies fever chiills  cough   n/v/d     last night with dark bm , h/H  s/p PRBC   GI consulted for GI bleed     PAST MEDICAL & SURGICAL HISTORY:  Former smoker      CAD in native artery      Type 2 diabetes mellitus      Hyperlipidemia, unspecified hyperlipidemia type      Essential hypertension, hypertension with unspecified goal      Afib      Hematoma of leg      Stented coronary artery      CHF (congestive heart failure)      s/p Carotid Endarterectomy  left          [ ] Diabetes   [ ] Hypertension  [ ] Hyperlipidemia  [ ] CAD  [ ] PCI  [ ] CABG    PREVIOUS DIAGNOSTIC TESTING:    [ ] Echocardiogram:  [ ]  Catheterization:  [ ] Stress Test:  	    MEDICATIONS:  aspirin enteric coated 81 milliGRAM(s) Oral daily  clopidogrel Tablet 75 milliGRAM(s) Oral daily  isosorbide   dinitrate Tablet (ISORDIL) 20 milliGRAM(s) Oral three times a day  metoprolol tartrate 25 milliGRAM(s) Oral two times a day        acetaminophen     Tablet .. 650 milliGRAM(s) Oral every 6 hours PRN  acetaminophen   IVPB .. 1000 milliGRAM(s) IV Intermittent once    pantoprazole  Injectable 80 milliGRAM(s) IV Push once  pantoprazole Infusion 8 mG/Hr IV Continuous <Continuous>    atorvastatin 80 milliGRAM(s) Oral at bedtime  dextrose 50% Injectable 25 Gram(s) IV Push once  dextrose 50% Injectable 25 Gram(s) IV Push once  dextrose 50% Injectable 12.5 Gram(s) IV Push once  dextrose Oral Gel 15 Gram(s) Oral once PRN  glucagon  Injectable 1 milliGRAM(s) IntraMuscular once  insulin glargine Injectable (LANTUS) 15 Unit(s) SubCutaneous at bedtime  insulin lispro (ADMELOG) corrective regimen sliding scale   SubCutaneous three times a day before meals    dextrose 5%. 1000 milliLiter(s) IV Continuous <Continuous>  dextrose 5%. 1000 milliLiter(s) IV Continuous <Continuous>      Allergies    No Known Allergies    Intolerances        FAMILY HISTORY:  Family history of heart disease  father  age 71    FH: atrial fibrillation  sister        SOCIAL HISTORY:    [ ] Non-smoker  [ ] Smoker  [ ] Alcohol      REVIEW OF SYSTEMS:  [ ]chest pain  [  ]shortness of breath  [  ]palpitations  [  ]syncope  [ ]near syncope [  ]diplopia  [  ]altered mental status   [  ]fevers  [ ]chills [ ]nausea  [ ]vomitting  [ ]abdominal pain  [ ]melena  [ ]BRBPR  [  ]epistaxis  [  ]rash  [  ]lower extremity edema      CONSTITUTIONAL: No fever, weight loss, or fatigue  EYES: No eye pain, visual disturbances, or discharge  ENMT:  No difficulty hearing, tinnitus, vertigo; No sinus or throat pain  NECK: No pain or stiffness  RESPIRATORY: No cough, wheezing, chills or hemoptysis; No Shortness of Breath  CARDIOVASCULAR: ++ chest pain,   GASTROINTESTINAL: No abdominal or epigastric pain. No nausea, vomiting, or hematemesis; No diarrhea or constipation. No melena or hematochezia.  GENITOURINARY: No dysuria, frequency, hematuria, or incontinence  NEUROLOGICAL: No headaches, memory loss, loss of strength, numbness, or tremors  SKIN: No itching, burning, rashes, or lesions   LYMPH Nodes: No enlarged glands  ENDOCRINE: No heat or cold intolerance; No hair loss  MUSCULOSKELETAL: No joint pain or swelling; No muscle, back, or extremity pain  PSYCHIATRIC: No depression, anxiety, mood swings, or difficulty sleeping  HEME/LYMPH: No easy bruising, or bleeding gums  ALLERY AND IMMUNOLOGIC: No hives or eczema	    [ ] All others negative	  [ ] Unable to obtain    PHYSICAL EXAM:  T(C): 36.9 (02-10-24 @ 05:08), Max: 36.9 (02-10-24 @ 05:08)  HR: 82 (02-10-24 @ 05:08) (82 - 123)  BP: 110/68 (02-10-24 @ 05:08) (97/63 - 144/78)  RR: 18 (02-10-24 @ 05:08) (18 - 18)  SpO2: 94% (02-10-24 @ 05:08) (94% - 100%)  Wt(kg): --  I&O's Summary      Appearance: Normal	  HEENT:   Normal oral mucosa, PERRL, EOMI	  Lymphatic: No lymphadenopathy  Cardiovascular: Normal S1 S2, No JVD, No murmurs, No edema  Respiratory: Lungs clear to auscultation	  Psychiatry: A & O x 3, Mood & affect appropriate  Gastrointestinal:  Soft, Non-tender, + BS	  Skin: No rashes, No ecchymoses, No cyanosis	  Neurologic: Non-focal  Extremities: Normal range of motion, No clubbing, cyanosis or edema  Vascular: Peripheral pulses palpable 2+ bilaterally    TELEMETRY: 	nsr     ECG:  	  RADIOLOGY:  OTHER: 	  	  LABS:	 	    CARDIAC MARKERS:                                  9.0    9.74  )-----------( 179      ( 10 Feb 2024 07:24 )             26.9     02-10    137  |  106  |  73<H>  ----------------------------<  163<H>  5.3   |  14<L>  |  2.58<H>    Ca    9.1      10 Feb 2024 07:24  Mg     2.6     -09    TPro  5.4<L>  /  Alb  3.5  /  TBili  2.2<H>  /  DBili  x   /  AST  138<H>  /  ALT  95<H>  /  AlkPhos  82  02-10    proBNP:   Lipid Profile:   HgA1c:   TSH: Thyroid Stimulating Hormone, Serum: 1.76 uIU/mL (02-10 @ 07:24)      ASSESSMENT/PLAN: 	70 y/o male with hx of CAD s/p multiple  stents (most recent to pLM (70%), pLAD, mLAD on 24 with Dr. Dominguez), HFrEF (EF~50% as of 23), A-fib, HTN, HLD, T2DM, CKD3, former smoker admitted with recurrent cp similar to his pain from cad .    1. NSTEMI  per cardiology  hold heparin given GI bleed    2. GI Bleed. Now stable s/p PRBC  -IV PPI Infusion  -continue diet for now  -hold heparin  -EGD once cleared by cardiology  -If DAPT needed in the setting of recent stent may continue    3. CKD  per nephrology    4. CHF        Advanced care planning forms were discussed. Code status including forceful chest compressions, defibrillation and intubation were discussed. The risks benefits and alternatives to pertinent gastrointestinal procedures and interventions were discussed in detail and all questions were answered. Duration: 15 Minutes.    [facility]  Donte Parikh M.D.   [address] [place], [place]  Office: [phone]    Last night with dark stool , CBC with low H/H , s/p PRBC    trend h/h  cont protonix gtt  CAD noted ,  cont DAPT at present , benefit outweights risk  Creat noted  eventual E  D/W Dr Parikh

## 2024-02-10 NOTE — PROGRESS NOTE ADULT - SUBJECTIVE AND OBJECTIVE BOX
NEPHROLOGY -    Patient seen and examined.    MEDICATIONS  (STANDING):  acetaminophen   IVPB .. 1000 milliGRAM(s) IV Intermittent once  aspirin enteric coated 81 milliGRAM(s) Oral daily  atorvastatin 80 milliGRAM(s) Oral at bedtime  clopidogrel Tablet 75 milliGRAM(s) Oral daily  dextrose 5%. 1000 milliLiter(s) (50 mL/Hr) IV Continuous <Continuous>  dextrose 5%. 1000 milliLiter(s) (100 mL/Hr) IV Continuous <Continuous>  dextrose 50% Injectable 12.5 Gram(s) IV Push once  dextrose 50% Injectable 25 Gram(s) IV Push once  dextrose 50% Injectable 25 Gram(s) IV Push once  glucagon  Injectable 1 milliGRAM(s) IntraMuscular once  insulin glargine Injectable (LANTUS) 15 Unit(s) SubCutaneous at bedtime  insulin lispro (ADMELOG) corrective regimen sliding scale   SubCutaneous three times a day before meals  isosorbide   dinitrate Tablet (ISORDIL) 20 milliGRAM(s) Oral three times a day  metoprolol tartrate 25 milliGRAM(s) Oral two times a day  pantoprazole  Injectable 80 milliGRAM(s) IV Push once  pantoprazole Infusion 8 mG/Hr (10 mL/Hr) IV Continuous <Continuous>    VITALS:  T(C): , Max: 37.1 (02-10-24 @ 11:29)  T(F): , Max: 98.7 (02-10-24 @ 11:29)  HR: 87 (02-10-24 @ 11:29)  BP: 105/68 (02-10-24 @ 11:29)  RR: 18 (02-10-24 @ 11:29)  SpO2: 98% (02-10-24 @ 11:29)    I and O's:    02-10 @ 07:01  -  02-10 @ 15:13  --------------------------------------------------------  IN: 0 mL / OUT: 600 mL / NET: -600 mL    PHYSICAL EXAM:  Constitutional: NAD, Alert  HEENT: NCAT, MMM  Neck: Supple, No JVD  Respiratory: CTA-b/l  Cardiovascular: tachy, irreg s1s2  Gastrointestinal: BS+, soft, NT/ND  Extremities: No peripheral edema b/l  Neurological: no focal deficits; strength grossly intact  Back: no CVAT b/l  Skin: No rashes, no nevi    LABS:                        10.8   12.70 )-----------( 195      ( 10 Feb 2024 14:00 )             32.0     02-10    137  |  106  |  73<H>  ----------------------------<  163<H>  5.3   |  14<L>  |  2.58<H>    Ca    9.1      10 Feb 2024 07:24  Mg     2.6     02-09    TPro  5.4<L>  /  Alb  3.5  /  TBili  2.2<H>  /  DBili  x   /  AST  138<H>  /  ALT  95<H>  /  AlkPhos  82  02-10   NEPHROLOGY -    Patient seen and examined resting comfortably on room air, denies sob, no pain, in no acute distress.     MEDICATIONS  (STANDING):  acetaminophen   IVPB .. 1000 milliGRAM(s) IV Intermittent once  aspirin enteric coated 81 milliGRAM(s) Oral daily  atorvastatin 80 milliGRAM(s) Oral at bedtime  clopidogrel Tablet 75 milliGRAM(s) Oral daily  dextrose 5%. 1000 milliLiter(s) (50 mL/Hr) IV Continuous <Continuous>  dextrose 5%. 1000 milliLiter(s) (100 mL/Hr) IV Continuous <Continuous>  dextrose 50% Injectable 12.5 Gram(s) IV Push once  dextrose 50% Injectable 25 Gram(s) IV Push once  dextrose 50% Injectable 25 Gram(s) IV Push once  glucagon  Injectable 1 milliGRAM(s) IntraMuscular once  insulin glargine Injectable (LANTUS) 15 Unit(s) SubCutaneous at bedtime  insulin lispro (ADMELOG) corrective regimen sliding scale   SubCutaneous three times a day before meals  isosorbide   dinitrate Tablet (ISORDIL) 20 milliGRAM(s) Oral three times a day  metoprolol tartrate 25 milliGRAM(s) Oral two times a day  pantoprazole  Injectable 80 milliGRAM(s) IV Push once  pantoprazole Infusion 8 mG/Hr (10 mL/Hr) IV Continuous <Continuous>    VITALS:  T(C): , Max: 37.1 (02-10-24 @ 11:29)  T(F): , Max: 98.7 (02-10-24 @ 11:29)  HR: 87 (02-10-24 @ 11:29)  BP: 105/68 (02-10-24 @ 11:29)  RR: 18 (02-10-24 @ 11:29)  SpO2: 98% (02-10-24 @ 11:29)    I and O's:    02-10 @ 07:01  -  02-10 @ 15:13  --------------------------------------------------------  IN: 0 mL / OUT: 600 mL / NET: -600 mL    PHYSICAL EXAM:  Constitutional: NAD, Alert  HEENT: NCAT, MMM  Neck: Supple, No JVD  Respiratory: CTA-b/l  Cardiovascular: irreg s1s2  Gastrointestinal: BS+, soft, NT/ND  Extremities: No peripheral edema b/l  Neurological: no focal deficits; strength grossly intact  Back: no CVAT b/l  Skin: No rashes, no nevi    LABS:                        10.8   12.70 )-----------( 195      ( 10 Feb 2024 14:00 )             32.0     02-10    137  |  106  |  73<H>  ----------------------------<  163<H>  5.3   |  14<L>  |  2.58<H>    Ca    9.1      10 Feb 2024 07:24  Mg     2.6     02-09    TPro  5.4<L>  /  Alb  3.5  /  TBili  2.2<H>  /  DBili  x   /  AST  138<H>  /  ALT  95<H>  /  AlkPhos  82  02-10

## 2024-02-10 NOTE — PROGRESS NOTE ADULT - ASSESSMENT
68 y/o male with hx of CAD s/p multiple  stents (most recent to pLM (70%), pLAD, mLAD on 1/8/24 with Dr. Dominguez), HFrEF , A-fib, HTN, HLD, T2DM, CKD3, former smoker    Assessment  DMT2: 69y Male with DM T2 with hyperglycemia, A1C 6.7% , was on oral meds and insulin at home (Farxiga and Semglee basal insulin), on low dose insulin, glucose improving eating meals.   CAD: on medications, stable, monitored. CTS eval recent stent.   HTN: on antihypertensive medications, monitored, asymptomatic.  CKD: Monitor labs/BMP,           Myrna Rossi MD  Cell: [phone]  Office: [phone]

## 2024-02-11 LAB
ANION GAP SERPL CALC-SCNC: 10 MMOL/L — SIGNIFICANT CHANGE UP (ref 5–17)
BUN SERPL-MCNC: 54 MG/DL — HIGH (ref 7–23)
CALCIUM SERPL-MCNC: 9 MG/DL — SIGNIFICANT CHANGE UP (ref 8.4–10.5)
CHLORIDE SERPL-SCNC: 109 MMOL/L — HIGH (ref 96–108)
CK MB BLD-MCNC: 1.6 % — SIGNIFICANT CHANGE UP (ref 0–3.5)
CK MB CFR SERPL CALC: 21 NG/ML — HIGH (ref 0–6.7)
CK SERPL-CCNC: 1318 U/L — HIGH (ref 30–200)
CO2 SERPL-SCNC: 20 MMOL/L — LOW (ref 22–31)
CREAT SERPL-MCNC: 2.01 MG/DL — HIGH (ref 0.5–1.3)
EGFR: 36 ML/MIN/1.73M2 — LOW
GLUCOSE BLDC GLUCOMTR-MCNC: 117 MG/DL — HIGH (ref 70–99)
GLUCOSE BLDC GLUCOMTR-MCNC: 147 MG/DL — HIGH (ref 70–99)
GLUCOSE BLDC GLUCOMTR-MCNC: 157 MG/DL — HIGH (ref 70–99)
GLUCOSE BLDC GLUCOMTR-MCNC: 217 MG/DL — HIGH (ref 70–99)
GLUCOSE SERPL-MCNC: 154 MG/DL — HIGH (ref 70–99)
HCT VFR BLD CALC: 26.5 % — LOW (ref 39–50)
HGB BLD-MCNC: 8.9 G/DL — LOW (ref 13–17)
MCHC RBC-ENTMCNC: 31.7 PG — SIGNIFICANT CHANGE UP (ref 27–34)
MCHC RBC-ENTMCNC: 33.6 GM/DL — SIGNIFICANT CHANGE UP (ref 32–36)
MCV RBC AUTO: 94.3 FL — SIGNIFICANT CHANGE UP (ref 80–100)
NRBC # BLD: 0 /100 WBCS — SIGNIFICANT CHANGE UP (ref 0–0)
PLATELET # BLD AUTO: 163 K/UL — SIGNIFICANT CHANGE UP (ref 150–400)
POTASSIUM SERPL-MCNC: 4.3 MMOL/L — SIGNIFICANT CHANGE UP (ref 3.5–5.3)
POTASSIUM SERPL-SCNC: 4.3 MMOL/L — SIGNIFICANT CHANGE UP (ref 3.5–5.3)
RBC # BLD: 2.81 M/UL — LOW (ref 4.2–5.8)
RBC # FLD: 14.6 % — HIGH (ref 10.3–14.5)
SODIUM SERPL-SCNC: 139 MMOL/L — SIGNIFICANT CHANGE UP (ref 135–145)
TROPONIN T, HIGH SENSITIVITY RESULT: 528 NG/L — HIGH (ref 0–51)
WBC # BLD: 11.07 K/UL — HIGH (ref 3.8–10.5)
WBC # FLD AUTO: 11.07 K/UL — HIGH (ref 3.8–10.5)

## 2024-02-11 RX ORDER — INFLUENZA VIRUS VACCINE 15; 15; 15; 15 UG/.5ML; UG/.5ML; UG/.5ML; UG/.5ML
0.7 SUSPENSION INTRAMUSCULAR ONCE
Refills: 0 | Status: DISCONTINUED | OUTPATIENT
Start: 2024-02-11 | End: 2024-02-15

## 2024-02-11 RX ORDER — INSULIN GLARGINE 100 [IU]/ML
7 INJECTION, SOLUTION SUBCUTANEOUS ONCE
Refills: 0 | Status: COMPLETED | OUTPATIENT
Start: 2024-02-11 | End: 2024-02-11

## 2024-02-11 RX ADMIN — Medication 1: at 17:36

## 2024-02-11 RX ADMIN — CLOPIDOGREL BISULFATE 75 MILLIGRAM(S): 75 TABLET, FILM COATED ORAL at 11:13

## 2024-02-11 RX ADMIN — ISOSORBIDE DINITRATE 20 MILLIGRAM(S): 5 TABLET ORAL at 16:23

## 2024-02-11 RX ADMIN — Medication 25 MILLIGRAM(S): at 16:23

## 2024-02-11 RX ADMIN — INSULIN GLARGINE 7 UNIT(S): 100 INJECTION, SOLUTION SUBCUTANEOUS at 22:01

## 2024-02-11 RX ADMIN — ATORVASTATIN CALCIUM 80 MILLIGRAM(S): 80 TABLET, FILM COATED ORAL at 22:01

## 2024-02-11 RX ADMIN — Medication 81 MILLIGRAM(S): at 11:13

## 2024-02-11 RX ADMIN — PANTOPRAZOLE SODIUM 10 MG/HR: 20 TABLET, DELAYED RELEASE ORAL at 22:01

## 2024-02-11 RX ADMIN — ISOSORBIDE DINITRATE 20 MILLIGRAM(S): 5 TABLET ORAL at 11:13

## 2024-02-11 RX ADMIN — Medication 25 MILLIGRAM(S): at 05:13

## 2024-02-11 RX ADMIN — ISOSORBIDE DINITRATE 20 MILLIGRAM(S): 5 TABLET ORAL at 05:13

## 2024-02-11 RX ADMIN — Medication 2: at 13:16

## 2024-02-11 NOTE — PROGRESS NOTE ADULT - SUBJECTIVE AND OBJECTIVE BOX
SUBJECTIVE:  NO CP no SOB     MEDICATIONS:  isosorbide   dinitrate Tablet (ISORDIL) 20 milliGRAM(s) Oral three times a day  metoprolol tartrate 25 milliGRAM(s) Oral two times a day    acetaminophen     Tablet .. 650 milliGRAM(s) Oral every 6 hours PRN  acetaminophen   IVPB .. 1000 milliGRAM(s) IV Intermittent once    pantoprazole  Injectable 80 milliGRAM(s) IV Push once  pantoprazole Infusion 8 mG/Hr IV Continuous <Continuous>    atorvastatin 80 milliGRAM(s) Oral at bedtime  dextrose 50% Injectable 25 Gram(s) IV Push once  dextrose 50% Injectable 25 Gram(s) IV Push once  dextrose 50% Injectable 12.5 Gram(s) IV Push once  dextrose Oral Gel 15 Gram(s) Oral once PRN  glucagon  Injectable 1 milliGRAM(s) IntraMuscular once  insulin glargine Injectable (LANTUS) 15 Unit(s) SubCutaneous at bedtime  insulin lispro (ADMELOG) corrective regimen sliding scale   SubCutaneous three times a day before meals    aspirin enteric coated 81 milliGRAM(s) Oral daily  clopidogrel Tablet 75 milliGRAM(s) Oral daily  dextrose 5%. 1000 milliLiter(s) IV Continuous <Continuous>  dextrose 5%. 1000 milliLiter(s) IV Continuous <Continuous>  influenza  Vaccine (HIGH DOSE) 0.7 milliLiter(s) IntraMuscular once      REVIEW OF SYSTEMS:    CONSTITUTIONAL: No fever, weight loss, or fatigue  EYES: No eye pain, visual disturbances, or discharge  NECK: No pain or stiffness  RESPIRATORY: No cough, wheezing, chills or hemoptysis; No Shortness of Breath  CARDIOVASCULAR: No chest pain, palpitations, dizziness, or leg swelling  GASTROINTESTINAL: No abdominal or epigastric pain. No nausea, vomiting, or hematemesis; No diarrhea or constipation. No melena or hematochezia.  GENITOURINARY: No dysuria, frequency, hematuria, or incontinence  NEUROLOGICAL: No headaches, memory loss, loss of strength, numbness, or tremors  SKIN: No itching, burning, rashes, or lesions   LYMPH Nodes: No enlarged glands  MUSCULOSKELETAL: No joint pain or swelling; No muscle, back, or extremity pain  All other review of systems are negative.  	  [ ] Unable to obtain    PHYSICAL EXAM:  T(C): 37.1 (02-11-24 @ 12:16), Max: 37.1 (02-11-24 @ 12:16)  HR: 89 (02-11-24 @ 12:16) (88 - 90)  BP: 125/80 (02-11-24 @ 12:16) (116/65 - 129/65)  RR: 16 (02-11-24 @ 12:16) (16 - 18)  SpO2: 97% (02-11-24 @ 12:16) (97% - 99%)  Wt(kg): --  I&O's Summary    10 Feb 2024 07:01  -  11 Feb 2024 07:00  --------------------------------------------------------  IN: 0 mL / OUT: 900 mL / NET: -900 mL          PHYSICAL EXAM    Appearance: Normal	  HEENT:   Normal oral mucosa, PERRL, EOMI	  NECK: Soft and supple, No LAD, No JVD  Cardiovascular: Regular Rate and Rhythm, Normal S1 S2, No murmurs, No clicks, gallops or rubs  Respiratory: Lungs clear to auscultation	  Gastrointestinal:  Soft, Non-tender, + BS	  Skin: No rashes, No ecchymoses, No cyanosis  Neurologic: Non-focal  Extremities: No clubbing, cyanosis or edema  Vascular: Peripheral pulses palpable 2+ bilaterally    LABS:	 	                            8.9    11.07 )-----------( 163      ( 11 Feb 2024 06:48 )             26.5     02-11    139  |  109<H>  |  54<H>  ----------------------------<  154<H>  4.3   |  20<L>  |  2.01<H>    Ca    9.0      11 Feb 2024 06:51    TPro  5.4<L>  /  Alb  3.5  /  TBili  2.2<H>  /  DBili  x   /  AST  138<H>  /  ALT  95<H>  /  AlkPhos  82  02-10

## 2024-02-11 NOTE — PROGRESS NOTE ADULT - ASSESSMENT
69 year-old man with history of multiple medical issues including hypertension, diabetes mellitus, coronary artery disease, atrial fibrillation, and chronic kidney disease. He presented overnight to an outside hospital with acute-onset chest pain waking him up at 4a.m; he left AMA from the ER and came to the [facility] ER so that he could be treated by his own doctors. He is s/p multiple admissions within the past month at [facility] for chest pain/NSTEMI. He was admitted at [facility] from 1/5-1/9/24 after presenting with chest pain and ruling in for NSTEMI. He underwent cardiac cath 1/8/24 with Dr. Julito Dominguez; drug eluting stents were deployed at sites of his 80% proximal LAD lesion, 80% mid-LAD lesion, and 70% proximal left main lesion. he came to the ER on Sunday 1/28/24 after his cardiac enzymes from 1/26/24 returned elevated. He underwent cardiac cath 1/29/24; his stents were patent. HE returns with CP   - D/W Dr Dominguez personally, advises No cath at present time  - Appreciate Dr Kelton Nunn's consult - feels open heart surgery is not an option for him and wouldf not be helpful nor indicated at present time  - Entresto Farxiga is on hold  - Cont antianginal therapy - nitrates and BB  - melenic stool - transfused PRBC.   - given marked drop in H/H  - discussed with Dr Dominguez - hold Plavix x 48 hours maintain ASA  - consider Ranexa if renal function improves   - trend H/H  - appreciate multiple consultants f/u. Optimized from CV standpoint to proceed with EGD - a low risk procedure and potentially life saving given magnitude of bleed   - pt and family updated

## 2024-02-11 NOTE — PROGRESS NOTE ADULT - ASSESSMENT
70 y/o male with hx of CAD s/p multiple  stents (most recent to [facility] (70%), pLAD, mLAD on 1/8/24 with Dr. Dominguez), HFrEF (EF~50% as of 11/26/23), A-fib, HTN, HLD, T2DM, CKD3, former smoker admitted with recurrent cp similar to his pain from cad ..  denies fever chiills  cough   n/v/d       Angina / NSTEMI : d/w Dr. Rivas and Dr. Deleon   holding plavix and AC   on asa   consult interventional and CTS :  cont medical management     acute anemia sec to acute GIB:  ppi   s/p transfusion   fu w GI : possible EGD tmmrw       elevated LFT : monitor on lipitor     BARRY on ckd : hold lasix  faxiga     DM monitor fs

## 2024-02-11 NOTE — PROVIDER CONTACT NOTE (CRITICAL VALUE NOTIFICATION) - ASSESSMENT
Pt with recent bloody BM. CBC and PTT drawn. Denies SOB, dizziness.
pt complain of slight pain while washing this am pt sat down and  pain ceased pt advised  to call staff when event is happening not to wait

## 2024-02-11 NOTE — PROGRESS NOTE ADULT - ASSESSMENT
70 y/o male with hx of CAD s/p multiple  stents (most recent to [facility] (70%), pLAD, mLAD on 1/8/24 with Dr. Dominguez), HFrEF , A-fib, HTN, HLD, T2DM, CKD3, former smoker    Assessment  DMT2: 69y Male with DM T2 with hyperglycemia, A1C 6.7% , was on oral meds and insulin at home (Farxiga and Semglee basal insulin), on low dose insulin, glucose improving eating meals.   CAD: on medications, stable, monitored. CTS eval recent stent.   HTN: on antihypertensive medications, monitored, asymptomatic.  CKD: Monitor labs/BMP,         Discussed plan and management with Dr Flo Jamil NP - TEAMS  Myrna Rossi MD  Cell: [phone]  Office: [phone]          70 y/o male with hx of CAD s/p multiple  stents (most recent to [facility] (70%), pLAD, mLAD on 1/8/24 with Dr. Dominguez), HFrEF , A-fib, HTN, HLD, T2DM,  CKD3, former smoker    Assessment  DMT2: 69y Male with DM T2 with hyperglycemia, A1C 6.7% , was on oral meds and insulin at home (Farxiga and Semglee basal insulin), on low dose insulin, glucose improving eating meals.   CAD: on medications, stable, monitored. CTS eval recent stent.   HTN: on antihypertensive medications, monitored, asymptomatic.  CKD: Monitor labs/BMP,         Discussed plan and management with Dr Flo Jamil NP - TEAMS  Myrna Rossi MD  Cell: [phone]  Office: [phone]

## 2024-02-11 NOTE — PROGRESS NOTE ADULT - SUBJECTIVE AND OBJECTIVE BOX
Date of service: 24 @ 23:05      Patient is a 69y old  Male who presents with a chief complaint of CP (10 Feb 2024 16:45)                                                               INTERVAL HPI/OVERNIGHT EVENTS:    REVIEW OF SYSTEMS:     CONSTITUTIONAL: No weakness, fevers or chills  RESPIRATORY: No cough, wheezing,  No shortness of breath  CARDIOVASCULAR: No chest pain or palpitations  GASTROINTESTINAL: No abdominal pain  . No nausea, vomiting, or hematemesis; No diarrhea or constipation. No melena or hematochezia.  GENITOURINARY: No dysuria, frequency or hematuria  NEUROLOGICAL: No numbness or weakness                                                                                                                                                                                                                                                                                Medications:  MEDICATIONS  (STANDING):  acetaminophen   IVPB .. 1000 milliGRAM(s) IV Intermittent once  aspirin enteric coated 81 milliGRAM(s) Oral daily  atorvastatin 80 milliGRAM(s) Oral at bedtime  dextrose 5%. 1000 milliLiter(s) (100 mL/Hr) IV Continuous <Continuous>  dextrose 5%. 1000 milliLiter(s) (50 mL/Hr) IV Continuous <Continuous>  dextrose 50% Injectable 12.5 Gram(s) IV Push once  dextrose 50% Injectable 25 Gram(s) IV Push once  dextrose 50% Injectable 25 Gram(s) IV Push once  glucagon  Injectable 1 milliGRAM(s) IntraMuscular once  influenza  Vaccine (HIGH DOSE) 0.7 milliLiter(s) IntraMuscular once  insulin glargine Injectable (LANTUS) 15 Unit(s) SubCutaneous at bedtime  insulin lispro (ADMELOG) corrective regimen sliding scale   SubCutaneous three times a day before meals  isosorbide   dinitrate Tablet (ISORDIL) 20 milliGRAM(s) Oral three times a day  metoprolol tartrate 25 milliGRAM(s) Oral two times a day  pantoprazole  Injectable 80 milliGRAM(s) IV Push once  pantoprazole Infusion 8 mG/Hr (10 mL/Hr) IV Continuous <Continuous>    MEDICATIONS  (PRN):  acetaminophen     Tablet .. 650 milliGRAM(s) Oral every 6 hours PRN Mild Pain (1 - 3), Moderate Pain (4 - 6)  dextrose Oral Gel 15 Gram(s) Oral once PRN Blood Glucose LESS THAN 70 milliGRAM(s)/deciliter       Allergies    No Known Allergies    Intolerances      Vital Signs Last 24 Hrs  T(C): 36.6 (2024 20:31), Max: 37.1 (2024 12:16)  T(F): 97.9 (:), Max: 98.7 (2024 12:16)  HR: 82 () (82 - 90)  BP: 118/70 () (118/70 - 129/65)  BP(mean): --  RR: 18 () (16 - 18)  SpO2: 97% () (97% - 97%)    Parameters below as of   Patient On (Oxygen Delivery Method): room air      CAPILLARY BLOOD GLUCOSE      POCT Blood Glucose.: 147 mg/dL (2024 21:39)  POCT Blood Glucose.: 157 mg/dL (2024 17:17)  POCT Blood Glucose.: 217 mg/dL (2024 12:40)  POCT Blood Glucose.: 117 mg/dL (2024 08:49)      02-10 @ 07: @ 07:00  --------------------------------------------------------  IN: 0 mL / OUT: 900 mL / NET: -900 mL     @ 07:11 @ 23:05  --------------------------------------------------------  IN: 320 mL / OUT: 400 mL / NET: -80 mL      Physical Exam:    Daily     Daily Weight in k.7 (2024 05:08)  General:  Well appearing, NAD, not cachetic  HEENT:  Nonicteric, PERRLA  CV:  RRR, S1S2   Lungs:  CTA B/L, no wheezes, rales, rhonchi  Abdomen:  Soft, non-tender, no distended, positive BS  Extremities:  2+ pulses, no c/c, no edema  Skin:  Warm and dry, no rashes  :  No moreno  Neuro:  AAOx3, non-focal, grossly intact                                                                                                                                                                                                                                                                                                LABS:                               8.9    11.07 )-----------( 163      ( 2024 06:48 )             26.5                      02-11    139  |  109<H>  |  54<H>  ----------------------------<  154<H>  4.3   |  20<L>  |  2.01<H>    Ca    9.0      2024 06:51    TPro  5.4<L>  /  Alb  3.5  /  TBili  2.2<H>  /  DBili  x   /  AST  138<H>  /  ALT  95<H>  /  AlkPhos  82  02-10                       RADIOLOGY & ADDITIONAL TESTS         I personally reviewed: [  ]EKG   [  ]CXR    [  ] CT      A/P:         Discussed with :     Derek consultants' Notes   Time spent :

## 2024-02-11 NOTE — PATIENT PROFILE ADULT - FALL HARM RISK - HARM RISK INTERVENTIONS

## 2024-02-11 NOTE — PROGRESS NOTE ADULT - SUBJECTIVE AND OBJECTIVE BOX
Chief Complaint:  Patient is a 69y old  Male who presents with a chief complaint of CP (10 Feb 2024 16:45)      Date of service 24 @ 09:53      Interval Events:   no GI complaints    Hospital Medications:  acetaminophen     Tablet .. 650 milliGRAM(s) Oral every 6 hours PRN  acetaminophen   IVPB .. 1000 milliGRAM(s) IV Intermittent once  aspirin enteric coated 81 milliGRAM(s) Oral daily  atorvastatin 80 milliGRAM(s) Oral at bedtime  clopidogrel Tablet 75 milliGRAM(s) Oral daily  dextrose 5%. 1000 milliLiter(s) IV Continuous <Continuous>  dextrose 5%. 1000 milliLiter(s) IV Continuous <Continuous>  dextrose 50% Injectable 12.5 Gram(s) IV Push once  dextrose 50% Injectable 25 Gram(s) IV Push once  dextrose 50% Injectable 25 Gram(s) IV Push once  dextrose Oral Gel 15 Gram(s) Oral once PRN  glucagon  Injectable 1 milliGRAM(s) IntraMuscular once  influenza  Vaccine (HIGH DOSE) 0.7 milliLiter(s) IntraMuscular once  insulin glargine Injectable (LANTUS) 15 Unit(s) SubCutaneous at bedtime  insulin lispro (ADMELOG) corrective regimen sliding scale   SubCutaneous three times a day before meals  isosorbide   dinitrate Tablet (ISORDIL) 20 milliGRAM(s) Oral three times a day  metoprolol tartrate 25 milliGRAM(s) Oral two times a day  pantoprazole  Injectable 80 milliGRAM(s) IV Push once  pantoprazole Infusion 8 mG/Hr IV Continuous <Continuous>        Review of Systems:  General:  No wt loss, fevers, chills, night sweats, fatigue,   Eyes:  Good vision, no reported pain  ENT:  No sore throat, pain, runny nose, dysphagia  CV:  No pain, palpitations, hypo/hypertension  Resp:  No dyspnea, cough, tachypnea, wheezing  GI:  See HPI  :  No pain, bleeding, incontinence, nocturia  Muscle:  No pain, weakness  Neuro:  No weakness, tingling, memory problems  Psych:  No fatigue, insomnia, mood problems, depression  Endocrine:  No polyuria, polydipsia, cold/heat intolerance  Heme:  No petechiae, ecchymosis, easy bruisability  Integumentary:  No rash, edema    PHYSICAL EXAM:   Vital Signs:  Vital Signs Last 24 Hrs  T(C): 36.8 (2024 05:08), Max: 37.1 (10 Feb 2024 11:29)  T(F): 98.2 (2024 05:08), Max: 98.7 (10 Feb 2024 11:29)  HR: 90 (2024 05:08) (86 - 90)  BP: 129/65 (2024 05:08) (105/68 - 135/72)  BP(mean): --  RR: 18 (2024 05:08) (18 - 18)  SpO2: 97% (:08) (95% - 99%)    Parameters below as of 2024 05:08  Patient On (Oxygen Delivery Method): room air      Daily     Daily Weight in k.7 (2024 05:08)      PHYSICAL EXAM:     GENERAL:  Appears stated age, well-groomed, well-nourished, no distress  HEENT:  NC/AT,  conjunctivae anicteric, clear and pink,   NECK: supple, trachea midline  CHEST:  Full & symmetric excursion, no increased effort, breath sounds clear  HEART:  Regular rhythm, no JVD  ABDOMEN:  Soft, non-tender, non-distended, normoactive bowel sounds,  no masses , no hepatosplenomegaly  EXTREMITIES:  no cyanosis,clubbing or edema  SKIN:  No rash, erythema, or, ecchymoses, no jaundice  NEURO:  Alert, non-focal, no asterixis  PSYCH: Appropriate affect, oriented to place and time  RECTAL: Deferred      LABS Personally reviewed by me:                        8.9    .07 )-----------( 163      ( 2024 06:48 )             26.5     Mean Cell Volume: 94.3 fl (24 @ 06:48)        139  |  109<H>  |  54<H>  ----------------------------<  154<H>  4.3   |  20<L>  |  2.01<H>    Ca    9.0      2024 06:51    TPro  5.4<L>  /  Alb  3.5  /  TBili  2.2<H>  /  DBili  x   /  AST  138<H>  /  ALT  95<H>  /  AlkPhos  82  02-10    LIVER FUNCTIONS - ( 10 Feb 2024 07:24 )  Alb: 3.5 g/dL / Pro: 5.4 g/dL / ALK PHOS: 82 U/L / ALT: 95 U/L / AST: 138 U/L / GGT: x           PT/INR - ( 2024 19:52 )   PT: 18.2 sec;   INR: 1.68 ratio         PTT - ( 2024 19:52 )  PTT:51.2 sec  Urinalysis Basic - ( 2024 06:51 )    Color: x / Appearance: x / SG: x / pH: x  Gluc: 154 mg/dL / Ketone: x  / Bili: x / Urobili: x   Blood: x / Protein: x / Nitrite: x   Leuk Esterase: x / RBC: x / WBC x   Sq Epi: x / Non Sq Epi: x / Bacteria: x                              8.9    11.07 )-----------( 163      ( 2024 06:48 )             26.5                         10.8   12.70 )-----------( 195      ( 10 Feb 2024 14:00 )             32.0                         9.0    9.74  )-----------( 179      ( 10 Feb 2024 07:24 )             26.9                         8.3    9.92  )-----------( 204      ( 2024 20:00 )             25.2                         8.3    10.82 )-----------( 188      ( 2024 12:44 )             24.2       Imaging personally reviewed by me:

## 2024-02-11 NOTE — PROGRESS NOTE ADULT - SUBJECTIVE AND OBJECTIVE BOX
Chief complaint  Patient is a 69y old  Male who presents with a chief complaint of CP (10 Feb 2024 16:45)         Labs and Fingersticks  CAPILLARY BLOOD GLUCOSE      POCT Blood Glucose.: 217 mg/dL (11 Feb 2024 12:40)  POCT Blood Glucose.: 117 mg/dL (11 Feb 2024 08:49)  POCT Blood Glucose.: 162 mg/dL (10 Feb 2024 21:55)  POCT Blood Glucose.: 112 mg/dL (10 Feb 2024 17:50)      Anion Gap: 10 (02-11 @ 06:51)  Anion Gap: 17 (02-10 @ 07:24)  Anion Gap: 13 (02-09 @ 19:52)      Calcium: 9.0 (02-11 @ 06:51)  Calcium: 9.1 (02-10 @ 07:24)  Calcium: 9.7 (02-09 @ 19:52)  Albumin: 3.5 (02-10 @ 07:24)    Alanine Aminotransferase (ALT/SGPT): 95 *H* (02-10 @ 07:24)  Alkaline Phosphatase: 82 (02-10 @ 07:24)  Aspartate Aminotransferase (AST/SGOT): 138 *H* (02-10 @ 07:24)        02-11    139  |  109<H>  |  54<H>  ----------------------------<  154<H>  4.3   |  20<L>  |  2.01<H>    Ca    9.0      11 Feb 2024 06:51    TPro  5.4<L>  /  Alb  3.5  /  TBili  2.2<H>  /  DBili  x   /  AST  138<H>  /  ALT  95<H>  /  AlkPhos  82  02-10                        8.9    11.07 )-----------( 163      ( 11 Feb 2024 06:48 )             26.5     Medications  MEDICATIONS  (STANDING):  acetaminophen   IVPB .. 1000 milliGRAM(s) IV Intermittent once  aspirin enteric coated 81 milliGRAM(s) Oral daily  atorvastatin 80 milliGRAM(s) Oral at bedtime  clopidogrel Tablet 75 milliGRAM(s) Oral daily  dextrose 5%. 1000 milliLiter(s) (50 mL/Hr) IV Continuous <Continuous>  dextrose 5%. 1000 milliLiter(s) (100 mL/Hr) IV Continuous <Continuous>  dextrose 50% Injectable 25 Gram(s) IV Push once  dextrose 50% Injectable 12.5 Gram(s) IV Push once  dextrose 50% Injectable 25 Gram(s) IV Push once  glucagon  Injectable 1 milliGRAM(s) IntraMuscular once  influenza  Vaccine (HIGH DOSE) 0.7 milliLiter(s) IntraMuscular once  insulin glargine Injectable (LANTUS) 15 Unit(s) SubCutaneous at bedtime  insulin lispro (ADMELOG) corrective regimen sliding scale   SubCutaneous three times a day before meals  isosorbide   dinitrate Tablet (ISORDIL) 20 milliGRAM(s) Oral three times a day  metoprolol tartrate 25 milliGRAM(s) Oral two times a day  pantoprazole  Injectable 80 milliGRAM(s) IV Push once  pantoprazole Infusion 8 mG/Hr (10 mL/Hr) IV Continuous <Continuous>      Physical Exam  General: Patient comfortable in bed  Vital Signs Last 12 Hrs  T(F): 98.7 (02-11-24 @ 12:16), Max: 98.7 (02-11-24 @ 12:16)  HR: 89 (02-11-24 @ 12:16) (89 - 90)  BP: 125/80 (02-11-24 @ 12:16) (125/80 - 129/65)  BP(mean): --  RR: 16 (02-11-24 @ 12:16) (16 - 18)  SpO2: 97% (02-11-24 @ 12:16) (97% - 97%)    CVS: S1S2   Respiratory: No wheezing, no crepitations  GI: Abdomen soft, bowel sounds positive  Musculoskeletal:  moves all extremities         Chief complaint  Patient is a 69y old  Male who presents with a chief complaint of CP (10 Feb 2024 16:45)     Labs and Fingersticks  CAPILLARY BLOOD GLUCOSE      POCT Blood Glucose.: 217 mg/dL (11 Feb 2024 12:40)  POCT Blood Glucose.: 117 mg/dL (11 Feb 2024 08:49)  POCT Blood Glucose.: 162 mg/dL (10 Feb 2024 21:55)  POCT Blood Glucose.: 112 mg/dL (10 Feb 2024 17:50)      Anion Gap: 10 (02-11 @ 06:51)  Anion Gap: 17 (02-10 @ 07:24)  Anion Gap: 13 (02-09 @ 19:52)      Calcium: 9.0 (02-11 @ 06:51)  Calcium: 9.1 (02-10 @ 07:24)  Calcium: 9.7 (02-09 @ 19:52)  Albumin: 3.5 (02-10 @ 07:24)    Alanine Aminotransferase (ALT/SGPT): 95 *H* (02-10 @ 07:24)  Alkaline Phosphatase: 82 (02-10 @ 07:24)  Aspartate Aminotransferase (AST/SGOT): 138 *H* (02-10 @ 07:24)        02-11    139  |  109<H>  |  54<H>  ----------------------------<  154<H>  4.3   |  20<L>  |  2.01<H>    Ca    9.0      11 Feb 2024 06:51    TPro  5.4<L>  /  Alb  3.5  /  TBili  2.2<H>  /  DBili  x   /  AST  138<H>  /  ALT  95<H>  /  AlkPhos  82  02-10                        8.9    11.07 )-----------( 163      ( 11 Feb 2024 06:48 )             26.5     Medications  MEDICATIONS  (STANDING):  acetaminophen   IVPB .. 1000 milliGRAM(s) IV Intermittent once  aspirin enteric coated 81 milliGRAM(s) Oral daily  atorvastatin 80 milliGRAM(s) Oral at bedtime  clopidogrel Tablet 75 milliGRAM(s) Oral daily  dextrose 5%. 1000 milliLiter(s) (50 mL/Hr) IV Continuous <Continuous>  dextrose 5%. 1000 milliLiter(s) (100 mL/Hr) IV Continuous <Continuous>  dextrose 50% Injectable 25 Gram(s) IV Push once  dextrose 50% Injectable 12.5 Gram(s) IV Push once  dextrose 50% Injectable 25 Gram(s) IV Push once  glucagon  Injectable 1 milliGRAM(s) IntraMuscular once  influenza  Vaccine (HIGH DOSE) 0.7 milliLiter(s) IntraMuscular once  insulin glargine Injectable (LANTUS) 15 Unit(s) SubCutaneous at bedtime  insulin lispro (ADMELOG) corrective regimen sliding scale   SubCutaneous three times a day before meals  isosorbide   dinitrate Tablet (ISORDIL) 20 milliGRAM(s) Oral three times a day  metoprolol tartrate 25 milliGRAM(s) Oral two times a day  pantoprazole  Injectable 80 milliGRAM(s) IV Push once  pantoprazole Infusion 8 mG/Hr (10 mL/Hr) IV Continuous <Continuous>      Physical Exam  General: Patient comfortable in bed  Vital Signs Last 12 Hrs  T(F): 98.7 (02-11-24 @ 12:16), Max: 98.7 (02-11-24 @ 12:16)  HR: 89 (02-11-24 @ 12:16) (89 - 90)  BP: 125/80 (02-11-24 @ 12:16) (125/80 - 129/65)  BP(mean): --  RR: 16 (02-11-24 @ 12:16) (16 - 18)  SpO2: 97% (02-11-24 @ 12:16) (97% - 97%)    CVS: S1S2   Respiratory: No wheezing, no crepitations  GI: Abdomen soft, bowel sounds positive  Musculoskeletal:  moves all extremities

## 2024-02-11 NOTE — PROGRESS NOTE ADULT - ASSESSMENT
1. NSTEMI  per cardiology  hold heparin given GI bleed    2. GI Bleed. Now stable s/p PRBC  -IV PPI Infusion  -continue diet for now  -hold heparin  -EGD once cleared by cardiology  -If DAPT needed in the setting of recent stent may continue, will clarify with cardiology    3. CKD  improving with volume resuscitation    4. CHF

## 2024-02-12 LAB
ANION GAP SERPL CALC-SCNC: 11 MMOL/L — SIGNIFICANT CHANGE UP (ref 5–17)
BUN SERPL-MCNC: 51 MG/DL — HIGH (ref 7–23)
CALCIUM SERPL-MCNC: 9.1 MG/DL — SIGNIFICANT CHANGE UP (ref 8.4–10.5)
CHLORIDE SERPL-SCNC: 107 MMOL/L — SIGNIFICANT CHANGE UP (ref 96–108)
CO2 SERPL-SCNC: 20 MMOL/L — LOW (ref 22–31)
CREAT SERPL-MCNC: 2.1 MG/DL — HIGH (ref 0.5–1.3)
EGFR: 34 ML/MIN/1.73M2 — LOW
GLUCOSE BLDC GLUCOMTR-MCNC: 117 MG/DL — HIGH (ref 70–99)
GLUCOSE BLDC GLUCOMTR-MCNC: 125 MG/DL — HIGH (ref 70–99)
GLUCOSE BLDC GLUCOMTR-MCNC: 153 MG/DL — HIGH (ref 70–99)
GLUCOSE BLDC GLUCOMTR-MCNC: 184 MG/DL — HIGH (ref 70–99)
GLUCOSE SERPL-MCNC: 124 MG/DL — HIGH (ref 70–99)
HCT VFR BLD CALC: 26.6 % — LOW (ref 39–50)
HGB BLD-MCNC: 8.9 G/DL — LOW (ref 13–17)
MCHC RBC-ENTMCNC: 31.7 PG — SIGNIFICANT CHANGE UP (ref 27–34)
MCHC RBC-ENTMCNC: 33.5 GM/DL — SIGNIFICANT CHANGE UP (ref 32–36)
MCV RBC AUTO: 94.7 FL — SIGNIFICANT CHANGE UP (ref 80–100)
NRBC # BLD: 0 /100 WBCS — SIGNIFICANT CHANGE UP (ref 0–0)
PLATELET # BLD AUTO: 174 K/UL — SIGNIFICANT CHANGE UP (ref 150–400)
POTASSIUM SERPL-MCNC: 4.2 MMOL/L — SIGNIFICANT CHANGE UP (ref 3.5–5.3)
POTASSIUM SERPL-SCNC: 4.2 MMOL/L — SIGNIFICANT CHANGE UP (ref 3.5–5.3)
RBC # BLD: 2.81 M/UL — LOW (ref 4.2–5.8)
RBC # FLD: 14.3 % — SIGNIFICANT CHANGE UP (ref 10.3–14.5)
SODIUM SERPL-SCNC: 138 MMOL/L — SIGNIFICANT CHANGE UP (ref 135–145)
WBC # BLD: 10.73 K/UL — HIGH (ref 3.8–10.5)
WBC # FLD AUTO: 10.73 K/UL — HIGH (ref 3.8–10.5)

## 2024-02-12 PROCEDURE — 93010 ELECTROCARDIOGRAM REPORT: CPT

## 2024-02-12 RX ORDER — LIDOCAINE HCL 20 MG/ML
4 VIAL (ML) INJECTION ONCE
Refills: 0 | Status: COMPLETED | OUTPATIENT
Start: 2024-02-12 | End: 2024-02-12

## 2024-02-12 RX ORDER — SODIUM CHLORIDE 9 MG/ML
500 INJECTION INTRAMUSCULAR; INTRAVENOUS; SUBCUTANEOUS
Refills: 0 | Status: COMPLETED | OUTPATIENT
Start: 2024-02-12 | End: 2024-02-12

## 2024-02-12 RX ORDER — SOD SULF/SODIUM/NAHCO3/KCL/PEG
1 SOLUTION, RECONSTITUTED, ORAL ORAL ONCE
Refills: 0 | Status: COMPLETED | OUTPATIENT
Start: 2024-02-12 | End: 2024-02-12

## 2024-02-12 RX ORDER — SACUBITRIL AND VALSARTAN 24; 26 MG/1; MG/1
1 TABLET, FILM COATED ORAL
Refills: 0 | Status: DISCONTINUED | OUTPATIENT
Start: 2024-02-12 | End: 2024-02-15

## 2024-02-12 RX ADMIN — SODIUM CHLORIDE 30 MILLILITER(S): 9 INJECTION INTRAMUSCULAR; INTRAVENOUS; SUBCUTANEOUS at 15:48

## 2024-02-12 RX ADMIN — ATORVASTATIN CALCIUM 80 MILLIGRAM(S): 80 TABLET, FILM COATED ORAL at 22:02

## 2024-02-12 RX ADMIN — Medication 1: at 18:09

## 2024-02-12 RX ADMIN — SACUBITRIL AND VALSARTAN 1 TABLET(S): 24; 26 TABLET, FILM COATED ORAL at 18:42

## 2024-02-12 RX ADMIN — Medication 25 MILLIGRAM(S): at 18:42

## 2024-02-12 RX ADMIN — Medication 4 MILLILITER(S): at 13:33

## 2024-02-12 RX ADMIN — PANTOPRAZOLE SODIUM 10 MG/HR: 20 TABLET, DELAYED RELEASE ORAL at 22:03

## 2024-02-12 RX ADMIN — ISOSORBIDE DINITRATE 20 MILLIGRAM(S): 5 TABLET ORAL at 15:51

## 2024-02-12 RX ADMIN — Medication 81 MILLIGRAM(S): at 15:51

## 2024-02-12 RX ADMIN — Medication 25 MILLIGRAM(S): at 05:15

## 2024-02-12 RX ADMIN — ISOSORBIDE DINITRATE 20 MILLIGRAM(S): 5 TABLET ORAL at 05:15

## 2024-02-12 RX ADMIN — PANTOPRAZOLE SODIUM 10 MG/HR: 20 TABLET, DELAYED RELEASE ORAL at 05:15

## 2024-02-12 RX ADMIN — INSULIN GLARGINE 15 UNIT(S): 100 INJECTION, SOLUTION SUBCUTANEOUS at 22:01

## 2024-02-12 RX ADMIN — PANTOPRAZOLE SODIUM 10 MG/HR: 20 TABLET, DELAYED RELEASE ORAL at 15:55

## 2024-02-12 NOTE — PROGRESS NOTE ADULT - ASSESSMENT
1. NSTEMI  per cardiology  hold heparin given GI bleed    2. GI Bleed. Now stable s/p PRBC  - IV PPI Infusion  - heparin and plavix on hold, on ASA  - plan for EGD today after additional unit of PRBC. Cardiology clearance appreciated.     3. CKD    4. CHF

## 2024-02-12 NOTE — PROGRESS NOTE ADULT - SUBJECTIVE AND OBJECTIVE BOX
SUBJECTIVE: Resting comfortably.   	  MEDICATIONS:  isosorbide   dinitrate Tablet (ISORDIL) 20 milliGRAM(s) Oral three times a day  metoprolol tartrate 25 milliGRAM(s) Oral two times a day  sacubitril 24 mG/valsartan 26 mG 1 Tablet(s) Oral two times a day  acetaminophen     Tablet .. 650 milliGRAM(s) Oral every 6 hours PRN  acetaminophen   IVPB .. 1000 milliGRAM(s) IV Intermittent once  pantoprazole  Injectable 80 milliGRAM(s) IV Push once  pantoprazole Infusion 8 mG/Hr IV Continuous <Continuous>  atorvastatin 80 milliGRAM(s) Oral at bedtime  dextrose 50% Injectable 12.5 Gram(s) IV Push once  dextrose 50% Injectable 25 Gram(s) IV Push once  dextrose 50% Injectable 25 Gram(s) IV Push once  dextrose Oral Gel 15 Gram(s) Oral once PRN  glucagon  Injectable 1 milliGRAM(s) IntraMuscular once  insulin glargine Injectable (LANTUS) 15 Unit(s) SubCutaneous at bedtime  insulin lispro (ADMELOG) corrective regimen sliding scale   SubCutaneous three times a day before meals  aspirin enteric coated 81 milliGRAM(s) Oral daily  dextrose 5%. 1000 milliLiter(s) IV Continuous <Continuous>  dextrose 5%. 1000 milliLiter(s) IV Continuous <Continuous>  influenza  Vaccine (HIGH DOSE) 0.7 milliLiter(s) IntraMuscular once      REVIEW OF SYSTEMS:    CONSTITUTIONAL: No fever, weight loss, or fatigue  EYES: No eye pain, visual disturbances, or discharge  NECK: No pain or stiffness  RESPIRATORY: No cough, wheezing, chills or hemoptysis; No Shortness of Breath  CARDIOVASCULAR: No chest pain, palpitations, dizziness, or leg swelling  GASTROINTESTINAL: No abdominal or epigastric pain. No nausea, vomiting, or hematemesis; No diarrhea or constipation. No melena or hematochezia.  GENITOURINARY: No dysuria, frequency, hematuria, or incontinence  NEUROLOGICAL: No headaches, memory loss, loss of strength, numbness, or tremors  SKIN: No itching, burning, rashes, or lesions   LYMPH Nodes: No enlarged glands  MUSCULOSKELETAL: No joint pain or swelling; No muscle, back, or extremity pain  All other review of systems are negative.      PHYSICAL EXAM:  T(C): 36.7 (02-12-24 @ 10:17), Max: 37.1 (02-11-24 @ 12:16)  HR: 77 (02-12-24 @ 10:17) (77 - 89)  BP: 128/70 (02-12-24 @ 10:17) (118/70 - 134/80)  RR: 19 (02-12-24 @ 10:17) (16 - 19)  SpO2: 100% (02-12-24 @ 10:17) (97% - 100%)  Wt(kg): --  I&O's Summary    11 Feb 2024 07:01  -  12 Feb 2024 07:00  --------------------------------------------------------  IN: 440 mL / OUT: 1000 mL / NET: -560 mL        Weight (kg): 59.4 (02-12 @ 09:03)    PHYSICAL EXAM    Appearance: Normal	  HEENT:   Normal oral mucosa, PERRL, EOMI	  NECK: Soft and supple, No LAD, No JVD  Cardiovascular: Regular Rate and Rhythm, Normal S1 S2, No murmurs, No clicks, gallops or rubs  Respiratory: Lungs clear to auscultation	  Extremities: No clubbing, cyanosis or edema  	    LABS:	 	                 8.9    10.73 )-----------( 174      ( 12 Feb 2024 07:17 )             26.6     02-12    138  |  107  |  51<H>  ----------------------------<  124<H>  4.2   |  20<L>  |  2.10<H>    Ca    9.1      12 Feb 2024 07:19

## 2024-02-12 NOTE — PROGRESS NOTE ADULT - SUBJECTIVE AND OBJECTIVE BOX
Chief complaint  Patient is a 69y old  Male who presents with a chief complaint of CP (10 Feb 2024 16:45)         Labs and Fingersticks  CAPILLARY BLOOD GLUCOSE      POCT Blood Glucose.: 125 mg/dL (12 Feb 2024 08:46)  POCT Blood Glucose.: 147 mg/dL (11 Feb 2024 21:39)  POCT Blood Glucose.: 157 mg/dL (11 Feb 2024 17:17)      Anion Gap: 11 (02-12 @ 07:19)  Anion Gap: 10 (02-11 @ 06:51)      Calcium: 9.1 (02-12 @ 07:19)  Calcium: 9.0 (02-11 @ 06:51)          02-12    138  |  107  |  51<H>  ----------------------------<  124<H>  4.2   |  20<L>  |  2.10<H>    Ca    9.1      12 Feb 2024 07:19                          8.9    10.73 )-----------( 174      ( 12 Feb 2024 07:17 )             26.6     Medications  MEDICATIONS  (STANDING):  acetaminophen   IVPB .. 1000 milliGRAM(s) IV Intermittent once  aspirin enteric coated 81 milliGRAM(s) Oral daily  atorvastatin 80 milliGRAM(s) Oral at bedtime  dextrose 5%. 1000 milliLiter(s) (50 mL/Hr) IV Continuous <Continuous>  dextrose 5%. 1000 milliLiter(s) (100 mL/Hr) IV Continuous <Continuous>  dextrose 50% Injectable 12.5 Gram(s) IV Push once  dextrose 50% Injectable 25 Gram(s) IV Push once  dextrose 50% Injectable 25 Gram(s) IV Push once  glucagon  Injectable 1 milliGRAM(s) IntraMuscular once  influenza  Vaccine (HIGH DOSE) 0.7 milliLiter(s) IntraMuscular once  insulin glargine Injectable (LANTUS) 15 Unit(s) SubCutaneous at bedtime  insulin lispro (ADMELOG) corrective regimen sliding scale   SubCutaneous three times a day before meals  isosorbide   dinitrate Tablet (ISORDIL) 20 milliGRAM(s) Oral three times a day  metoprolol tartrate 25 milliGRAM(s) Oral two times a day  pantoprazole  Injectable 80 milliGRAM(s) IV Push once  pantoprazole Infusion 8 mG/Hr (10 mL/Hr) IV Continuous <Continuous>  sacubitril 24 mG/valsartan 26 mG 1 Tablet(s) Oral two times a day  sodium chloride 0.9%. 500 milliLiter(s) (30 mL/Hr) IV Continuous <Continuous>      Physical Exam  General: Patient comfortable in bed   Vital Signs Last 12 Hrs  T(F): 98.4 (02-12-24 @ 12:25), Max: 98.4 (02-12-24 @ 12:25)  HR: 75 (02-12-24 @ 14:17) (75 - 94)  BP: 114/53 (02-12-24 @ 14:17) (113/55 - 175/68)  BP(mean): 85 (02-12-24 @ 13:17) (85 - 85)  RR: 18 (02-12-24 @ 14:17) (17 - 19)  SpO2: 98% (02-12-24 @ 14:17) (98% - 100%)    CVS: S1S2   Respiratory: No wheezing, no crepitations  GI: Abdomen soft, bowel sounds positive  Musculoskeletal:  moves all extremities  : Voiding     Chief complaint  Patient is a 69y old  Male who presents with a chief complaint of CP (10 Feb 2024 16:45)         Labs and Fingersticks  CAPILLARY BLOOD GLUCOSE      POCT Blood Glucose.: 125 mg/dL (12 Feb 2024 08:46)  POCT Blood Glucose.: 147 mg/dL (11 Feb 2024 21:39)  POCT Blood Glucose.: 157 mg/dL (11 Feb 2024 17:17)      Anion Gap: 11 (02-12 @ 07:19)  Anion Gap: 10 (02-11 @ 06:51)      Calcium: 9.1 (02-12 @ 07:19)  Calcium: 9.0 (02-11 @ 06:51)          02-12    138  |  107  |  51<H>  ----------------------------<  124<H>  4.2   |  20<L>  |  2.10<H>    Ca    9.1      12 Feb 2024 07:19                          8.9    10.73 )-----------( 174      ( 12 Feb 2024 07:17 )             26.6     Medications  MEDICATIONS  (STANDING):  acetaminophen   IVPB .. 1000 milliGRAM(s) IV Intermittent once  aspirin enteric coated 81 milliGRAM(s) Oral daily  atorvastatin 80 milliGRAM(s) Oral at bedtime  dextrose 5%. 1000 milliLiter(s) (50 mL/Hr) IV Continuous <Continuous>  dextrose 5%. 1000 milliLiter(s) (100 mL/Hr) IV Continuous <Continuous>  dextrose 50% Injectable 12.5 Gram(s) IV Push once  dextrose 50% Injectable 25 Gram(s) IV Push once  dextrose 50% Injectable 25 Gram(s) IV Push once  glucagon  Injectable 1 milliGRAM(s) IntraMuscular once  influenza  Vaccine (HIGH DOSE) 0.7 milliLiter(s) IntraMuscular once  insulin glargine Injectable (LANTUS) 15 Unit(s) SubCutaneous at bedtime  insulin lispro (ADMELOG) corrective regimen sliding scale   SubCutaneous three times a day before meals  isosorbide   dinitrate Tablet (ISORDIL) 20 milliGRAM(s) Oral three times a day  metoprolol tartrate 25 milliGRAM(s) Oral two times a day  pantoprazole  Injectable 80 milliGRAM(s) IV Push once  pantoprazole Infusion 8 mG/Hr (10 mL/Hr) IV Continuous <Continuous>  sacubitril 24 mG/valsartan 26 mG 1 Tablet(s) Oral two times a day  sodium chloride 0.9%. 500 milliLiter(s) (30 mL/Hr) IV Continuous <Continuous>      Physical Exam  General: Patient comfortable in bed   Vital Signs Last 12 Hrs  T(F): 98.4 (02-12-24 @ 12:25), Max: 98.4 (02-12-24 @ 12:25)  HR: 75 (02-12-24 @ 14:17) (75 - 94)  BP: 114/53 (02-12-24 @ 14:17) (113/55 - 175/68)  BP(mean): 85 (02-12-24 @ 13:17) (85 - 85)  RR: 18 (02-12-24 @ 14:17) (17 - 19)  SpO2: 98% (02-12-24 @ 14:17) (98% - 100%)    CVS: S1S2   Respiratory: No wheezing, no crepitations  GI: Abdomen soft, bowel sounds positive  Musculoskeletal:  moves all extremities  : Voiding

## 2024-02-12 NOTE — PROGRESS NOTE ADULT - ASSESSMENT
68 y/o male with hx of CAD s/p multiple  stents (most recent to pLM (70%), pLAD, mLAD on 1/8/24 with Dr. Dominguez), HFrEF (EF~50% as of 11/26/23), A-fib, HTN, HLD, T2DM, CKD3, former smoker admitted with recurrent cp similar to his pain from cad ..  denies fever chiills  cough   n/v/d       Angina / NSTEMI : d/w Dr. Rivas and Dr. Deleon   holding plavix and AC   on asa   consult interventional and CTS :  cont medical management     acute anemia sec to acute GIB:  ppi   s/p transfusion   fu w GI : possible EGD       elevated LFT : monitor on lipitor     BARRY on ckd : hold lasix  faxiga     DM monitor fs

## 2024-02-12 NOTE — PROGRESS NOTE ADULT - SUBJECTIVE AND OBJECTIVE BOX
Chief Complaint:  Patient is a 69y old  Male who presents with a chief complaint of CP (10 Feb 2024 16:45)      Date of service 24 @ 10:40      Interval Events:     Hospital Medications:  acetaminophen     Tablet .. 650 milliGRAM(s) Oral every 6 hours PRN  acetaminophen   IVPB .. 1000 milliGRAM(s) IV Intermittent once  aspirin enteric coated 81 milliGRAM(s) Oral daily  atorvastatin 80 milliGRAM(s) Oral at bedtime  dextrose 5%. 1000 milliLiter(s) IV Continuous <Continuous>  dextrose 5%. 1000 milliLiter(s) IV Continuous <Continuous>  dextrose 50% Injectable 12.5 Gram(s) IV Push once  dextrose 50% Injectable 25 Gram(s) IV Push once  dextrose 50% Injectable 25 Gram(s) IV Push once  dextrose Oral Gel 15 Gram(s) Oral once PRN  glucagon  Injectable 1 milliGRAM(s) IntraMuscular once  influenza  Vaccine (HIGH DOSE) 0.7 milliLiter(s) IntraMuscular once  insulin glargine Injectable (LANTUS) 15 Unit(s) SubCutaneous at bedtime  insulin lispro (ADMELOG) corrective regimen sliding scale   SubCutaneous three times a day before meals  isosorbide   dinitrate Tablet (ISORDIL) 20 milliGRAM(s) Oral three times a day  metoprolol tartrate 25 milliGRAM(s) Oral two times a day  pantoprazole  Injectable 80 milliGRAM(s) IV Push once  pantoprazole Infusion 8 mG/Hr IV Continuous <Continuous>  sacubitril 24 mG/valsartan 26 mG 1 Tablet(s) Oral two times a day        Review of Systems:  General:  No wt loss, fevers, chills, night sweats, fatigue,   Eyes:  Good vision, no reported pain  ENT:  No sore throat, pain, runny nose, dysphagia  CV:  No pain, palpitations, hypo/hypertension  Resp:  No dyspnea, cough, tachypnea, wheezing  GI:  See HPI  :  No pain, bleeding, incontinence, nocturia  Muscle:  No pain, weakness  Neuro:  No weakness, tingling, memory problems  Psych:  No fatigue, insomnia, mood problems, depression  Endocrine:  No polyuria, polydipsia, cold/heat intolerance  Heme:  No petechiae, ecchymosis, easy bruisability  Integumentary:  No rash, edema    PHYSICAL EXAM:   Vital Signs:  Vital Signs Last 24 Hrs  T(C): 36.7 (2024 10:17), Max: 37.1 (2024 12:16)  T(F): 98 (2024 10:17), Max: 98.7 (2024 12:16)  HR: 77 (2024 10:17) (77 - 89)  BP: 128/70 (2024 10:17) (118/70 - 134/80)  BP(mean): --  RR: 19 (2024 10:17) (16 - 19)  SpO2: 100% (2024 10:) (97% - 100%)    Parameters below as of 2024 10:17  Patient On (Oxygen Delivery Method): room air      Daily     Daily Weight in k.4 (2024 05:07)      PHYSICAL EXAM:     GENERAL:  Appears stated age, well-groomed, well-nourished, no distress  HEENT:  NC/AT,  conjunctivae anicteric, clear and pink,   NECK: supple, trachea midline  CHEST:  Full & symmetric excursion, no increased effort, breath sounds clear  HEART:  Regular rhythm, no JVD  ABDOMEN:  Soft, non-tender, non-distended, normoactive bowel sounds,  no masses , no hepatosplenomegaly  EXTREMITIES:  no cyanosis,clubbing or edema  SKIN:  No rash, erythema, or, ecchymoses, no jaundice  NEURO:  Alert, non-focal, no asterixis  PSYCH: Appropriate affect, oriented to place and time  RECTAL: Deferred      LABS Personally reviewed by me:                        8.9    10.73 )-----------( 174      ( 2024 07:17 )             26.6     Mean Cell Volume: 94.7 fl (- @ 07:17)    -    138  |  107  |  51<H>  ----------------------------<  124<H>  4.2   |  20<L>  |  2.10<H>    Ca    9.1      2024 07:19          Urinalysis Basic - ( 2024 07:19 )    Color: x / Appearance: x / SG: x / pH: x  Gluc: 124 mg/dL / Ketone: x  / Bili: x / Urobili: x   Blood: x / Protein: x / Nitrite: x   Leuk Esterase: x / RBC: x / WBC x   Sq Epi: x / Non Sq Epi: x / Bacteria: x                              8.9    10.73 )-----------( 174      ( 2024 07:17 )             26.6                         8.9    11.07 )-----------( 163      ( 2024 06:48 )             26.5                         10.8   12.70 )-----------( 195      ( 10 Feb 2024 14:00 )             32.0                         9.0    9.74  )-----------( 179      ( 10 Feb 2024 07:24 )             26.9                         8.3    9.92  )-----------( 204      ( 2024 20:00 )             25.2       Imaging personally reviewed by me:

## 2024-02-12 NOTE — PROGRESS NOTE ADULT - ASSESSMENT
70 y/o male with hx of CAD s/p multiple  stents (most recent to pLM (70%), pLAD, mLAD on 1/8/24 with Dr. Dominguez), HFrEF , A-fib, HTN, HLD, T2DM,  CKD3, former smoker  Assessment  DMT2: 69y Male with DM T2 with hyperglycemia, A1C 6.7% , was on oral meds and insulin at home (Farxiga and Semglee basal insulin), on low dose insulin, glucose improving eating meals.   CAD: on medications, stable, monitored. CTS eval, recent stent.   HTN: on antihypertensive medications, monitored, asymptomatic.  CKD: Monitor labs/BMP,         Discussed plan and management with Dr Flo Jamil NP - TEAMS  Myrna Rossi MD  Cell: [phone]  Office: [phone]          68 y/o male with hx of CAD s/p multiple  stents (most recent to pLM (70%), pLAD, mLAD on 1/8/24 with Dr. Dominguez), HFrEF , A-fib, HTN, HLD, T2DM,  CKD3, former smoker  Assessment  DMT2: 69y Male with DM T2 with hyperglycemia, A1C 6.7% , was on oral meds and insulin at home (Farxiga and Semglee basal insulin), on low dose insulin,  glucose improving eating meals.   CAD: on medications, stable, monitored. CTS eval, recent stent.   HTN: on antihypertensive medications, monitored, asymptomatic.  CKD: Monitor labs/BMP,         Discussed plan and management with Dr Flo Jamil NP - TEAMS  Myrna Rossi MD  Cell: [phone]  Office: [phone]

## 2024-02-12 NOTE — PROGRESS NOTE ADULT - SUBJECTIVE AND OBJECTIVE BOX
Date of service: 24 @ 16:35      Patient is a 69y old  Male who presents with a chief complaint of CP (10 Feb 2024 16:45)                                                               INTERVAL HPI/OVERNIGHT EVENTS:    REVIEW OF SYSTEMS:     CONSTITUTIONAL: No weakness, fevers or chills  RESPIRATORY: No cough, wheezing,  No shortness of breath  CARDIOVASCULAR: No chest pain or palpitations  GASTROINTESTINAL: No abdominal pain  . No nausea, vomiting, or hematemesis; No diarrhea or constipation. No melena or hematochezia.  GENITOURINARY: No dysuria, frequency or hematuria  NEUROLOGICAL: No numbness or weakness  SKIN: No itching, burning, rashes, or lesions                                                                                                                                                                                                                                                                                 Medications:  MEDICATIONS  (STANDING):  acetaminophen   IVPB .. 1000 milliGRAM(s) IV Intermittent once  aspirin enteric coated 81 milliGRAM(s) Oral daily  atorvastatin 80 milliGRAM(s) Oral at bedtime  dextrose 5%. 1000 milliLiter(s) (50 mL/Hr) IV Continuous <Continuous>  dextrose 5%. 1000 milliLiter(s) (100 mL/Hr) IV Continuous <Continuous>  dextrose 50% Injectable 12.5 Gram(s) IV Push once  dextrose 50% Injectable 25 Gram(s) IV Push once  dextrose 50% Injectable 25 Gram(s) IV Push once  glucagon  Injectable 1 milliGRAM(s) IntraMuscular once  influenza  Vaccine (HIGH DOSE) 0.7 milliLiter(s) IntraMuscular once  insulin glargine Injectable (LANTUS) 15 Unit(s) SubCutaneous at bedtime  insulin lispro (ADMELOG) corrective regimen sliding scale   SubCutaneous three times a day before meals  isosorbide   dinitrate Tablet (ISORDIL) 20 milliGRAM(s) Oral three times a day  metoprolol tartrate 25 milliGRAM(s) Oral two times a day  pantoprazole  Injectable 80 milliGRAM(s) IV Push once  pantoprazole Infusion 8 mG/Hr (10 mL/Hr) IV Continuous <Continuous>  polyethylene glycol/electrolyte Solution 1 milliLiter(s) Oral once  sacubitril 24 mG/valsartan 26 mG 1 Tablet(s) Oral two times a day    MEDICATIONS  (PRN):  acetaminophen     Tablet .. 650 milliGRAM(s) Oral every 6 hours PRN Mild Pain (1 - 3), Moderate Pain (4 - 6)  dextrose Oral Gel 15 Gram(s) Oral once PRN Blood Glucose LESS THAN 70 milliGRAM(s)/deciliter       Allergies    No Known Allergies    Intolerances      Vital Signs Last 24 Hrs  T(C): 36.6 (2024 15:31), Max: 36.9 (2024 12:25)  T(F): 97.8 (2024 15:31), Max: 98.4 (2024 12:25)  HR: 88 (2024 15:) (75 - 94)  BP: 150/89 (2024 15:) (113/55 - 175/68)  BP(mean): 85 (2024 13:17) (85 - 85)  RR: 19 (2024 15:) (16 - 20)  SpO2: 97% (2024 15:31) (95% - 100%)    Parameters below as of 2024 15:31  Patient On (Oxygen Delivery Method): room air      CAPILLARY BLOOD GLUCOSE      POCT Blood Glucose.: 117 mg/dL (2024 15:42)  POCT Blood Glucose.: 125 mg/dL (2024 08:46)  POCT Blood Glucose.: 147 mg/dL (2024 21:39)  POCT Blood Glucose.: 157 mg/dL (2024 17:17)      - @ 07:01  -  02-12 @ 07:00  --------------------------------------------------------  IN: 440 mL / OUT: 1000 mL / NET: -560 mL      Physical Exam:    Daily Height in cm: 167.64 (2024 13:17)    Daily Weight in k.4 (2024 05:07)  General:  Well appearing, NAD, not cachetic  HEENT:  Nonicteric, PERRLA  CV:  RRR, S1S2   Lungs:  CTA B/L, no wheezes, rales, rhonchi  Abdomen:  Soft, non-tender, no distended, positive BS  Extremities:  no edema                                                                                                                                                                                                                                                                          LABS:                               8.9    10.73 )-----------( 174      ( 2024 07:17 )             26.6                      02-12    138  |  107  |  51<H>  ----------------------------<  124<H>  4.2   |  20<L>  |  2.10<H>    Ca    9.1      2024 07:19                         RADIOLOGY & ADDITIONAL TESTS         I personally reviewed: [  ]EKG   [  ]CXR    [  ] CT      A/P:         Discussed with :     Derek consultants' Notes   Time spent :

## 2024-02-12 NOTE — PROGRESS NOTE ADULT - ASSESSMENT
#ASHD s/p multiple pci, readmitted with chest discomfort and +troponin.   # HFrEF (EF~50%, 11/26/23)  #A-fib  #HTN  #HL  #T2DM  #CKD   #drop h/h: off plavix, maintain asa.   For egd today, rule out gi disease

## 2024-02-12 NOTE — PROGRESS NOTE ADULT - SUBJECTIVE AND OBJECTIVE BOX
Overnight events noted      VITAL:  T(C): , Max: 37.1 (02-11-24 @ 12:16)  T(F): , Max: 98.7 (02-11-24 @ 12:16)  HR: 82 (02-12-24 @ 05:07)  BP: 134/80 (02-12-24 @ 05:07)  BP(mean): --  RR: 18 (02-12-24 @ 05:07)  SpO2: 98% (02-12-24 @ 05:07)  Wt(kg): --      PHYSICAL EXAM:  Constitutional: NAD, Alert  HEENT: NCAT, MMM  Neck: Supple, No JVD  Respiratory: CTA-b/l  Cardiovascular: tachy, irreg s1s2  Gastrointestinal: BS+, soft, NT/ND  Extremities: No peripheral edema b/l  Neurological: no focal deficits; strength grossly intact  Back: no CVAT b/l  Skin: No rashes, no nevi    LABS:                        8.9    10.73 )-----------( 174      ( 12 Feb 2024 07:17 )             26.6     Na(138)/K(4.2)/Cl(107)/HCO3(20)/BUN(51)/Cr(2.10)Glu(124)/Ca(9.1)/Mg(--)/PO4(--)    02-12 @ 07:19  Na(139)/K(4.3)/Cl(109)/HCO3(20)/BUN(54)/Cr(2.01)Glu(154)/Ca(9.0)/Mg(--)/PO4(--)    02-11 @ 06:51  Na(137)/K(5.3)/Cl(106)/HCO3(14)/BUN(73)/Cr(2.58)Glu(163)/Ca(9.1)/Mg(--)/PO4(--)    02-10 @ 07:24  Na(135)/K(4.7)/Cl(101)/HCO3(21)/BUN(73)/Cr(2.75)Glu(153)/Ca(9.7)/Mg(--)/PO4(--)    02-09 @ 19:52      IMPRESSION: 69M w/ HTN, DM2, CAD, AFib, and CKD, s/p multiple admissions of late for chest pain/NSTEMI; 2/9/24 p/w chest pain/NSTEMI    (1)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  (2)BARRY - prerenal on admission from Lasix, Farxiga, Entresto, and GIB. Resolved, off the aforementioned meds and s/p PRBCs  (3)Lytes - acceptable  (4)Chest pain - recurrent chest pain from recurrent small NSTEMIs. Cardiology/CTS input appreciated - not a surgical candidate; potential risk outweighs potential benefit of further cardiac cath at this point  (5)GI - UGIB - cardiac-cleared for EGD      RECOMMEND:  (1)Can reintroduce low-dose Entresto at this point   (2)No Farxgia or Lasix for now  (3)Dose new meds for GFR 20-30ml/min  (4)EGD per GI        Markell Walton MD  [facility]  Office/on call physician: [phone]  Cell (7a-7p): [phone]       No pain, no sob  Last BM ~2days ago, bloody    VITAL:  T(C): , Max: 37.1 (02-11-24 @ 12:16)  T(F): , Max: 98.7 (02-11-24 @ 12:16)  HR: 82 (02-12-24 @ 05:07)  BP: 134/80 (02-12-24 @ 05:07)  BP(mean): --  RR: 18 (02-12-24 @ 05:07)  SpO2: 98% (02-12-24 @ 05:07)  Wt(kg): --      PHYSICAL EXAM:  Constitutional: NAD, Alert  HEENT: NCAT, MMM  Neck: Supple, No JVD  Respiratory: CTA-b/l  Cardiovascular: irreg s1s2  Gastrointestinal: BS+, soft, NT/ND  Extremities: No peripheral edema b/l  Neurological: no focal deficits; strength grossly intact  Back: no CVAT b/l  Skin: No rashes, no nevi    LABS:                        8.9    10.73 )-----------( 174      ( 12 Feb 2024 07:17 )             26.6     Na(138)/K(4.2)/Cl(107)/HCO3(20)/BUN(51)/Cr(2.10)Glu(124)/Ca(9.1)/Mg(--)/PO4(--)    02-12 @ 07:19  Na(139)/K(4.3)/Cl(109)/HCO3(20)/BUN(54)/Cr(2.01)Glu(154)/Ca(9.0)/Mg(--)/PO4(--)    02-11 @ 06:51  Na(137)/K(5.3)/Cl(106)/HCO3(14)/BUN(73)/Cr(2.58)Glu(163)/Ca(9.1)/Mg(--)/PO4(--)    02-10 @ 07:24  Na(135)/K(4.7)/Cl(101)/HCO3(21)/BUN(73)/Cr(2.75)Glu(153)/Ca(9.7)/Mg(--)/PO4(--)    02-09 @ 19:52      IMPRESSION: 69M w/ HTN, DM2, CAD, AFib, and CKD, s/p multiple admissions of late for chest pain/NSTEMI; 2/9/24 p/w chest pain/NSTEMI    (1)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  (2)BARRY - prerenal on admission from Lasix, Farxiga, Entresto, and GIB. Resolved, off the aforementioned meds and s/p PRBCs  (3)Lytes - acceptable  (4)Chest pain - recurrent chest pain from recurrent small NSTEMIs. Cardiology/CTS input appreciated - not a surgical candidate; potential risk outweighs potential benefit of further cardiac cath at this point  (5)GI - UGIB - cardiac-cleared for EGD      RECOMMEND:  (1)Can reintroduce low-dose Entresto at this point   (2)No Farxgia or Lasix for now  (3)Dose new meds for GFR 20-30ml/min  (4)EGD per GI        Markell Walton MD  [facility]  Office/on call physician: [phone]  Cell (7a-7p): [phone]

## 2024-02-13 LAB
ALBUMIN SERPL ELPH-MCNC: 3.4 G/DL — SIGNIFICANT CHANGE UP (ref 3.3–5)
ALP SERPL-CCNC: 95 U/L — SIGNIFICANT CHANGE UP (ref 40–120)
ALT FLD-CCNC: 80 U/L — HIGH (ref 10–45)
ANION GAP SERPL CALC-SCNC: 13 MMOL/L — SIGNIFICANT CHANGE UP (ref 5–17)
AST SERPL-CCNC: 91 U/L — HIGH (ref 10–40)
BILIRUB SERPL-MCNC: 2.3 MG/DL — HIGH (ref 0.2–1.2)
BUN SERPL-MCNC: 40 MG/DL — HIGH (ref 7–23)
CALCIUM SERPL-MCNC: 8.9 MG/DL — SIGNIFICANT CHANGE UP (ref 8.4–10.5)
CHLORIDE SERPL-SCNC: 107 MMOL/L — SIGNIFICANT CHANGE UP (ref 96–108)
CO2 SERPL-SCNC: 17 MMOL/L — LOW (ref 22–31)
CREAT SERPL-MCNC: 1.96 MG/DL — HIGH (ref 0.5–1.3)
EGFR: 37 ML/MIN/1.73M2 — LOW
GLUCOSE BLDC GLUCOMTR-MCNC: 153 MG/DL — HIGH (ref 70–99)
GLUCOSE BLDC GLUCOMTR-MCNC: 157 MG/DL — HIGH (ref 70–99)
GLUCOSE BLDC GLUCOMTR-MCNC: 167 MG/DL — HIGH (ref 70–99)
GLUCOSE BLDC GLUCOMTR-MCNC: 176 MG/DL — HIGH (ref 70–99)
GLUCOSE SERPL-MCNC: 180 MG/DL — HIGH (ref 70–99)
HCT VFR BLD CALC: 31.6 % — LOW (ref 39–50)
HGB BLD-MCNC: 10.9 G/DL — LOW (ref 13–17)
MCHC RBC-ENTMCNC: 31.2 PG — SIGNIFICANT CHANGE UP (ref 27–34)
MCHC RBC-ENTMCNC: 34.5 GM/DL — SIGNIFICANT CHANGE UP (ref 32–36)
MCV RBC AUTO: 90.5 FL — SIGNIFICANT CHANGE UP (ref 80–100)
NRBC # BLD: 0 /100 WBCS — SIGNIFICANT CHANGE UP (ref 0–0)
PLATELET # BLD AUTO: 159 K/UL — SIGNIFICANT CHANGE UP (ref 150–400)
POTASSIUM SERPL-MCNC: 4.2 MMOL/L — SIGNIFICANT CHANGE UP (ref 3.5–5.3)
POTASSIUM SERPL-SCNC: 4.2 MMOL/L — SIGNIFICANT CHANGE UP (ref 3.5–5.3)
PROT SERPL-MCNC: 5.4 G/DL — LOW (ref 6–8.3)
RBC # BLD: 3.49 M/UL — LOW (ref 4.2–5.8)
RBC # FLD: 15.7 % — HIGH (ref 10.3–14.5)
SODIUM SERPL-SCNC: 137 MMOL/L — SIGNIFICANT CHANGE UP (ref 135–145)
WBC # BLD: 10.03 K/UL — SIGNIFICANT CHANGE UP (ref 3.8–10.5)
WBC # FLD AUTO: 10.03 K/UL — SIGNIFICANT CHANGE UP (ref 3.8–10.5)

## 2024-02-13 PROCEDURE — 93010 ELECTROCARDIOGRAM REPORT: CPT

## 2024-02-13 RX ORDER — NITROGLYCERIN 6.5 MG
0.4 CAPSULE, EXTENDED RELEASE ORAL ONCE
Refills: 0 | Status: COMPLETED | OUTPATIENT
Start: 2024-02-13 | End: 2024-02-13

## 2024-02-13 RX ORDER — SOD SULF/SODIUM/NAHCO3/KCL/PEG
2 SOLUTION, RECONSTITUTED, ORAL ORAL ONCE
Refills: 0 | Status: COMPLETED | OUTPATIENT
Start: 2024-02-13 | End: 2024-02-13

## 2024-02-13 RX ORDER — PANTOPRAZOLE SODIUM 20 MG/1
40 TABLET, DELAYED RELEASE ORAL
Refills: 0 | Status: DISCONTINUED | OUTPATIENT
Start: 2024-02-13 | End: 2024-02-15

## 2024-02-13 RX ADMIN — ISOSORBIDE DINITRATE 20 MILLIGRAM(S): 5 TABLET ORAL at 05:33

## 2024-02-13 RX ADMIN — ATORVASTATIN CALCIUM 80 MILLIGRAM(S): 80 TABLET, FILM COATED ORAL at 21:52

## 2024-02-13 RX ADMIN — INSULIN GLARGINE 15 UNIT(S): 100 INJECTION, SOLUTION SUBCUTANEOUS at 21:52

## 2024-02-13 RX ADMIN — PANTOPRAZOLE SODIUM 40 MILLIGRAM(S): 20 TABLET, DELAYED RELEASE ORAL at 18:08

## 2024-02-13 RX ADMIN — Medication 81 MILLIGRAM(S): at 13:20

## 2024-02-13 RX ADMIN — ISOSORBIDE DINITRATE 20 MILLIGRAM(S): 5 TABLET ORAL at 22:29

## 2024-02-13 RX ADMIN — SACUBITRIL AND VALSARTAN 1 TABLET(S): 24; 26 TABLET, FILM COATED ORAL at 17:42

## 2024-02-13 RX ADMIN — Medication 1: at 17:43

## 2024-02-13 RX ADMIN — Medication 2 LITER(S): at 18:36

## 2024-02-13 RX ADMIN — Medication 0.4 MILLIGRAM(S): at 21:20

## 2024-02-13 RX ADMIN — ISOSORBIDE DINITRATE 20 MILLIGRAM(S): 5 TABLET ORAL at 13:23

## 2024-02-13 RX ADMIN — SACUBITRIL AND VALSARTAN 1 TABLET(S): 24; 26 TABLET, FILM COATED ORAL at 05:34

## 2024-02-13 RX ADMIN — Medication 25 MILLIGRAM(S): at 05:33

## 2024-02-13 RX ADMIN — Medication 25 MILLIGRAM(S): at 17:42

## 2024-02-13 RX ADMIN — Medication 1: at 13:18

## 2024-02-13 NOTE — PROVIDER CONTACT NOTE (OTHER) - BACKGROUND
pt admitted for atherosclerosis of native coronary artery without angina pectoris. Per pt takes SL Nitroglycerin at home prescribed.
Pt admitted for CP. Found to have GI bleed.
Pt admitted for chest pain, received nitroglycerin in the main ED. Tele monitoring.

## 2024-02-13 NOTE — PROGRESS NOTE ADULT - SUBJECTIVE AND OBJECTIVE BOX
Chief complaint    Patient is a 69y old  Male who presents with a chief complaint of Atherosclerosis of native coronary artery without angina pectoris     (13 Feb 2024 12:28)   Review of systems  Patient appears comfortable.    Labs and Fingersticks  CAPILLARY BLOOD GLUCOSE      POCT Blood Glucose.: 157 mg/dL (13 Feb 2024 12:33)  POCT Blood Glucose.: 153 mg/dL (13 Feb 2024 09:10)  POCT Blood Glucose.: 184 mg/dL (12 Feb 2024 21:11)  POCT Blood Glucose.: 153 mg/dL (12 Feb 2024 17:38)  POCT Blood Glucose.: 117 mg/dL (12 Feb 2024 15:42)      Anion Gap: 13 (02-13 @ 08:19)  Anion Gap: 11 (02-12 @ 07:19)      Calcium: 8.9 (02-13 @ 08:19)  Calcium: 9.1 (02-12 @ 07:19)  Albumin: 3.4 (02-13 @ 08:19)    Alanine Aminotransferase (ALT/SGPT): 80 *H* (02-13 @ 08:19)  Alkaline Phosphatase: 95 (02-13 @ 08:19)  Aspartate Aminotransferase (AST/SGOT): 91 *H* (02-13 @ 08:19)        02-13    137  |  107  |  40<H>  ----------------------------<  180<H>  4.2   |  17<L>  |  1.96<H>    Ca    8.9      13 Feb 2024 08:19    TPro  5.4<L>  /  Alb  3.4  /  TBili  2.3<H>  /  DBili  x   /  AST  91<H>  /  ALT  80<H>  /  AlkPhos  95  02-13                        10.9   10.03 )-----------( 159      ( 13 Feb 2024 08:19 )             31.6     Medications  MEDICATIONS  (STANDING):  acetaminophen   IVPB .. 1000 milliGRAM(s) IV Intermittent once  aspirin enteric coated 81 milliGRAM(s) Oral daily  atorvastatin 80 milliGRAM(s) Oral at bedtime  dextrose 5%. 1000 milliLiter(s) (100 mL/Hr) IV Continuous <Continuous>  dextrose 5%. 1000 milliLiter(s) (50 mL/Hr) IV Continuous <Continuous>  dextrose 50% Injectable 12.5 Gram(s) IV Push once  dextrose 50% Injectable 25 Gram(s) IV Push once  dextrose 50% Injectable 25 Gram(s) IV Push once  glucagon  Injectable 1 milliGRAM(s) IntraMuscular once  influenza  Vaccine (HIGH DOSE) 0.7 milliLiter(s) IntraMuscular once  insulin glargine Injectable (LANTUS) 15 Unit(s) SubCutaneous at bedtime  insulin lispro (ADMELOG) corrective regimen sliding scale   SubCutaneous three times a day before meals  isosorbide   dinitrate Tablet (ISORDIL) 20 milliGRAM(s) Oral three times a day  metoprolol tartrate 25 milliGRAM(s) Oral two times a day  pantoprazole    Tablet 40 milliGRAM(s) Oral two times a day  polyethylene glycol/electrolyte Solution 2 Liter(s) Oral once  sacubitril 24 mG/valsartan 26 mG 1 Tablet(s) Oral two times a day      Physical Exam  General: Patient appears comfortable.  Vital Signs Last 12 Hrs  T(F): 97.7 (02-13-24 @ 11:41), Max: 98.4 (02-13-24 @ 04:00)  HR: 94 (02-13-24 @ 11:41) (94 - 101)  BP: 116/50 (02-13-24 @ 11:41) (116/50 - 138/65)  BP(mean): --  RR: 18 (02-13-24 @ 11:41) (18 - 18)  SpO2: 100% (02-13-24 @ 11:41) (92% - 100%)  Neck: No palpable thyroid nodules.  CVS: S1S2, No murmurs  Respiratory: No wheezing, no crepitations  GI: Abdomen soft, non tender.    Diagnostics        Radiology:

## 2024-02-13 NOTE — DIETITIAN INITIAL EVALUATION ADULT - NSFNSGIIOFT_GEN_A_CORE
I&O's Detail    12 Feb 2024 07:01  -  13 Feb 2024 07:00  --------------------------------------------------------  IN:    Oral Fluid: 100 mL    Pantoprazole: 120 mL  Total IN: 220 mL    OUT:    Voided (mL): 350 mL  Total OUT: 350 mL    Total NET: -130 mL      13 Feb 2024 07:01  -  13 Feb 2024 12:37  --------------------------------------------------------  IN:  Total IN: 0 mL    OUT:    Voided (mL): 800 mL  Total OUT: 800 mL    Total NET: -800 mL

## 2024-02-13 NOTE — DIETITIAN INITIAL EVALUATION ADULT - ORAL INTAKE PTA/DIET HISTORY
Per pt PTA: Reports fair appetite/PO intake; consuming medium meals 3x/day. Reports micronutrient supplementation: multivitamin, vitamin C/D3, Zinc;  protein supplementation: Atkins protein bar (Per serving provides 10 g PRO, 3 g net CHO) ~1-2x/wk1x/day. No known food allergies/intolerances reported. No chewing/swallowing difficulties reported at this time.

## 2024-02-13 NOTE — DIETITIAN INITIAL EVALUATION ADULT - EDUCATION DIETARY MODIFICATIONS
Pt with questions regarding DM nutrition therapy; Provided education on Nutrition Therapy for Type 2 Diabetes. Educated on a consistent carbohydrate diet;Emphasis on what foods contain carbohydrates, importance of pairing carbohydrates with protein and healthy fats for glycemic control, and consuming sufficient dietary fiber. Discussed the importance of measuring blood sugar levels before/after meals; recommended documenting average blood glucose levels. Educated on the importance of consuming a diet adequate in protein rich food sources; encouraged prioritizing protein foods first at meal times; recommended small, frequent nutrient dense meals. Discussed the benefits of oral nutrition supplementation; recommended having small sips in between meals to optimize protein intake. Reinforced importance of monitoring daily wts for fluid fluctuations/shifts. Pt made aware RD to remain available for any questions./teach back/(2) meets goals/outcomes/verbalization

## 2024-02-13 NOTE — PROGRESS NOTE ADULT - ASSESSMENT
68 y/o male with hx of CAD s/p multiple  stents (most recent to pLM (70%), pLAD, mLAD on 1/8/24 with Dr. Dominguez), HFrEF , A-fib, HTN, HLD, T2DM,  CKD3, former smoker  Assessment  DMT2: 69y Male with DM T2 with hyperglycemia, A1C 6.7% , was on oral meds and insulin at home (Farxiga and Semglee basal insulin), on low dose insulin, blood sugars trending down.  CAD: on medications, stable, monitored. CTS eval, recent stent.   HTN: on antihypertensive medications, monitored, asymptomatic.  CKD: Monitor labs/BMP,         Myrna Rossi MD  Cell: [phone]  Office: [phone]

## 2024-02-13 NOTE — PROGRESS NOTE ADULT - ASSESSMENT
1. NSTEMI  per cardiology  a/c on holf given unknown source of gib    2. GI Bleed. Now stable s/p PRBC  - EGD without evidence of bleeding  - heparin and plavix on hold, on ASA  - Video Capsule underway, further recs pending read tomorrow afternoon     3. CKD    4. CHF   1. NSTEMI  per cardiology  a/c on holf given unknown source of gib    2. GI Bleed. Now stable s/p PRBC  - EGD without evidence of bleeding  - Protonix PO BID   - heparin and plavix on hold, on ASA  - Video Capsule underway, further recs pending read tomorrow afternoon     3. CKD    4. CHF

## 2024-02-13 NOTE — DIETITIAN NUTRITION RISK NOTIFICATION - TREATMENT: THE FOLLOWING DIET HAS BEEN RECOMMENDED
Diet, Clear Liquid (02-13-24 @ 12:18) [Active]  Diet, NPO after Midnight:      NPO Start Date: 13-Feb-2024,   NPO Start Time: 23:59 (02-13-24 @ 11:38) [Active]  Diet, NPO after Midnight:      NPO Start Date: 12-Feb-2024,   NPO Start Time: 23:59  Except Medications (02-12-24 @ 23:52) [Active]

## 2024-02-13 NOTE — DIETITIAN INITIAL EVALUATION ADULT - ADD RECOMMEND
[X] Defer diet advancement to medical team; in setting of acute GIB, consider advancing to clear liquids --> low fiber --> regular as pt tolerates; Defer texture/consistency to Speech Language Pathologist prn.   [X] Consider adding Nephro-Bill once daily for micronutrient coverage/promote wound healing, pending no medical contraindications.   [X] Malnutrition sticker placed in chart  [X] RD will continue to monitor PO intake, GI tolerance, weight trends, skin integrity, BMs, labs/electrolytes prn.   RD remains available upon request.

## 2024-02-13 NOTE — DIETITIAN INITIAL EVALUATION ADULT - ETIOLOGY
increased physiological demands  inability to meet estimated nutrient needs secondary to altered GI function

## 2024-02-13 NOTE — DIETITIAN INITIAL EVALUATION ADULT - PERTINENT MEDS FT
Writer discussed with Dr. Chidi Mcallister; order per MD:  9 tablets Imitrex to be prescribed as ordered previously by dr ray.     Voicemail message left for pt that his requested medication had been approved by MD and sent to preferred pharmacy. Encouraged pt in message to follow up as planned.      MEDICATIONS  (STANDING):  acetaminophen   IVPB .. 1000 milliGRAM(s) IV Intermittent once  aspirin enteric coated 81 milliGRAM(s) Oral daily  atorvastatin 80 milliGRAM(s) Oral at bedtime  dextrose 5%. 1000 milliLiter(s) (100 mL/Hr) IV Continuous <Continuous>  dextrose 5%. 1000 milliLiter(s) (50 mL/Hr) IV Continuous <Continuous>  dextrose 50% Injectable 25 Gram(s) IV Push once  dextrose 50% Injectable 25 Gram(s) IV Push once  dextrose 50% Injectable 12.5 Gram(s) IV Push once  glucagon  Injectable 1 milliGRAM(s) IntraMuscular once  influenza  Vaccine (HIGH DOSE) 0.7 milliLiter(s) IntraMuscular once  insulin glargine Injectable (LANTUS) 15 Unit(s) SubCutaneous at bedtime  insulin lispro (ADMELOG) corrective regimen sliding scale   SubCutaneous three times a day before meals  isosorbide   dinitrate Tablet (ISORDIL) 20 milliGRAM(s) Oral three times a day  metoprolol tartrate 25 milliGRAM(s) Oral two times a day  pantoprazole    Tablet 40 milliGRAM(s) Oral two times a day  polyethylene glycol/electrolyte Solution 2 Liter(s) Oral once  sacubitril 24 mG/valsartan 26 mG 1 Tablet(s) Oral two times a day    MEDICATIONS  (PRN):  acetaminophen     Tablet .. 650 milliGRAM(s) Oral every 6 hours PRN Mild Pain (1 - 3), Moderate Pain (4 - 6)  dextrose Oral Gel 15 Gram(s) Oral once PRN Blood Glucose LESS THAN 70 milliGRAM(s)/deciliter

## 2024-02-13 NOTE — PROGRESS NOTE ADULT - SUBJECTIVE AND OBJECTIVE BOX
Overnight events noted      VITAL:  T(C): , Max: 36.9 (02-12-24 @ 12:25)  T(F): , Max: 98.4 (02-12-24 @ 12:25)  HR: 101 (02-13-24 @ 04:00)  BP: 138/65 (02-13-24 @ 04:00)  BP(mean): 85 (02-12-24 @ 13:17)  RR: 18 (02-13-24 @ 04:00)  SpO2: 92% (02-13-24 @ 04:00)  Wt(kg): --      PHYSICAL EXAM:  Constitutional: NAD, Alert  HEENT: NCAT, MMM  Neck: Supple, No JVD  Respiratory: CTA-b/l  Cardiovascular: irreg s1s2  Gastrointestinal: BS+, soft, NT/ND  Extremities: No peripheral edema b/l  Neurological: no focal deficits; strength grossly intact  Back: no CVAT b/l  Skin: No rashes, no nevi      LABS:                        8.9    10.73 )-----------( 174      ( 12 Feb 2024 07:17 )             26.6     Na(138)/K(4.2)/Cl(107)/HCO3(20)/BUN(51)/Cr(2.10)Glu(124)/Ca(9.1)/Mg(--)/PO4(--)    02-12 @ 07:19  Na(139)/K(4.3)/Cl(109)/HCO3(20)/BUN(54)/Cr(2.01)Glu(154)/Ca(9.0)/Mg(--)/PO4(--)    02-11 @ 06:51      IMPRESSION: 69M w/ HTN, DM2, CAD, AFib, and CKD, s/p multiple admissions of late for chest pain/NSTEMI; 2/9/24 p/w chest pain/NSTEMI    (1)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  (2)BARRY - prerenal on admission from Lasix, Farxiga, Entresto, and GIB. Resolved, off the aforementioned meds and s/p PRBCs  (3)Chest pain - recurrent chest pain from recurrent small NSTEMIs. For medical management   (4)GI - GIB - EGD negative; potentially for capsule study      RECOMMEND:  (1)BMP daily; if creatinine <2.2 today and if K <5, then could increase Entresto to 49/51 (the dose he was taking at home)  (2)Dose new meds for GFR 20-30ml/min  (3)PRBCs per primary team/GI  (4)Capsule study per GI          Markell Walton MD  [facility]  Office/on call physician: [phone]  Cell (5x-7p): [phone]       no pain, no sob      VITAL:  T(C): , Max: 36.9 (02-12-24 @ 12:25)  T(F): , Max: 98.4 (02-12-24 @ 12:25)  HR: 101 (02-13-24 @ 04:00)  BP: 138/65 (02-13-24 @ 04:00)  BP(mean): 85 (02-12-24 @ 13:17)  RR: 18 (02-13-24 @ 04:00)  SpO2: 92% (02-13-24 @ 04:00)  Wt(kg): --      PHYSICAL EXAM:  Constitutional: NAD, Alert  HEENT: NCAT, MMM  Neck: Supple, No JVD  Respiratory: CTA-b/l  Cardiovascular: irreg s1s2  Gastrointestinal: BS+, soft, NT/ND  Extremities: No peripheral edema b/l  Neurological: no focal deficits; strength grossly intact  Back: no CVAT b/l  Skin: No rashes, no nevi      LABS:                        8.9    10.73 )-----------( 174      ( 12 Feb 2024 07:17 )             26.6     Na(138)/K(4.2)/Cl(107)/HCO3(20)/BUN(51)/Cr(2.10)Glu(124)/Ca(9.1)/Mg(--)/PO4(--)    02-12 @ 07:19  Na(139)/K(4.3)/Cl(109)/HCO3(20)/BUN(54)/Cr(2.01)Glu(154)/Ca(9.0)/Mg(--)/PO4(--)    02-11 @ 06:51      IMPRESSION: 69M w/ HTN, DM2, CAD, AFib, and CKD, s/p multiple admissions of late for chest pain/NSTEMI; 2/9/24 p/w chest pain/NSTEMI    (1)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  (2)BARRY - prerenal on admission from Lasix, Farxiga, Entresto, and GIB. Resolved, off the aforementioned meds and s/p PRBCs  (3)Chest pain - recurrent chest pain from recurrent small NSTEMIs. For medical management   (4)GI - GIB - EGD negative; potentially for capsule study      RECOMMEND:  (1)BMP daily; if creatinine <2.2 today and if K <5, then could increase Entresto to 49/51 (the dose he was taking at home)  (2)Dose new meds for GFR 20-30ml/min  (3)PRBCs per primary team/GI  (4)Capsule study per GI          Markell Walton MD  [facility]  Office/on call physician: [phone]  Cell (7a-7p): [phone]

## 2024-02-13 NOTE — PROGRESS NOTE ADULT - SUBJECTIVE AND OBJECTIVE BOX
INTERVAL HPI/OVERNIGHT EVENTS:    no reports of rectal bleeding   good response to PRBC   capsule today     MEDICATIONS  (STANDING):  acetaminophen   IVPB .. 1000 milliGRAM(s) IV Intermittent once  aspirin enteric coated 81 milliGRAM(s) Oral daily  atorvastatin 80 milliGRAM(s) Oral at bedtime  dextrose 5%. 1000 milliLiter(s) (100 mL/Hr) IV Continuous <Continuous>  dextrose 5%. 1000 milliLiter(s) (50 mL/Hr) IV Continuous <Continuous>  dextrose 50% Injectable 25 Gram(s) IV Push once  dextrose 50% Injectable 25 Gram(s) IV Push once  dextrose 50% Injectable 12.5 Gram(s) IV Push once  glucagon  Injectable 1 milliGRAM(s) IntraMuscular once  influenza  Vaccine (HIGH DOSE) 0.7 milliLiter(s) IntraMuscular once  insulin glargine Injectable (LANTUS) 15 Unit(s) SubCutaneous at bedtime  insulin lispro (ADMELOG) corrective regimen sliding scale   SubCutaneous three times a day before meals  isosorbide   dinitrate Tablet (ISORDIL) 20 milliGRAM(s) Oral three times a day  metoprolol tartrate 25 milliGRAM(s) Oral two times a day  pantoprazole  Injectable 80 milliGRAM(s) IV Push once  pantoprazole Infusion 8 mG/Hr (10 mL/Hr) IV Continuous <Continuous>  sacubitril 24 mG/valsartan 26 mG 1 Tablet(s) Oral two times a day    MEDICATIONS  (PRN):  acetaminophen     Tablet .. 650 milliGRAM(s) Oral every 6 hours PRN Mild Pain (1 - 3), Moderate Pain (4 - 6)  dextrose Oral Gel 15 Gram(s) Oral once PRN Blood Glucose LESS THAN 70 milliGRAM(s)/deciliter      Allergies    No Known Allergies    Intolerances        Review of Systems:    General:  No wt loss, fevers, chills, night sweats, fatigue   Eyes:  Good vision, no reported pain  ENT:  No sore throat, pain, runny nose, dysphagia  CV:  No pain, palpitations, hypo/hypertension  Resp:  No dyspnea, cough, tachypnea, wheezing  GI:  No pain, No nausea, No vomiting, No diarrhea, No constipation, No weight loss, No fever, No pruritis, No rectal bleeding, No melena, No dysphagia  :  No pain, bleeding, incontinence, nocturia  Muscle:  No pain, weakness  Neuro:  No weakness, tingling, memory problems  Psych:  No fatigue, insomnia, mood problems, depression  Endocrine:  No polyuria, polydypsia, cold/heat intolerance  Heme:  No petechiae, ecchymosis, easy bruisability  Skin:  No rash, tattoos, scars, edema      Vital Signs Last 24 Hrs  T(C): 36.9 (13 Feb 2024 04:00), Max: 36.9 (12 Feb 2024 12:25)  T(F): 98.4 (13 Feb 2024 04:00), Max: 98.4 (12 Feb 2024 12:25)  HR: 101 (13 Feb 2024 04:00) (70 - 101)  BP: 138/65 (13 Feb 2024 04:00) (113/55 - 175/68)  BP(mean): 85 (12 Feb 2024 13:17) (85 - 85)  RR: 18 (13 Feb 2024 04:00) (16 - 20)  SpO2: 92% (13 Feb 2024 04:00) (92% - 100%)    Parameters below as of 13 Feb 2024 04:00  Patient On (Oxygen Delivery Method): room air        PHYSICAL EXAM:    Constitutional: NAD  HEENT: EOMI, throat clear  Neck: No LAD, supple  Respiratory: CTA and P  Cardiovascular: S1 and S2, RRR, no M  Gastrointestinal: BS+, soft, NT/ND, neg HSM,  Extremities: No peripheral edema, neg clubbing, cyanosis  Vascular: 2+ peripheral pulses  Neurological: A/O x 3, no focal deficits  Psychiatric: Normal mood, normal affect  Skin: No rashes      LABS:                        10.9   10.03 )-----------( 159      ( 13 Feb 2024 08:19 )             31.6     02-13    137  |  107  |  40<H>  ----------------------------<  180<H>  4.2   |  17<L>  |  1.96<H>    Ca    8.9      13 Feb 2024 08:19    TPro  5.4<L>  /  Alb  3.4  /  TBili  2.3<H>  /  DBili  x   /  AST  91<H>  /  ALT  80<H>  /  AlkPhos  95  02-13      Urinalysis Basic - ( 13 Feb 2024 08:19 )    Color: x / Appearance: x / SG: x / pH: x  Gluc: 180 mg/dL / Ketone: x  / Bili: x / Urobili: x   Blood: x / Protein: x / Nitrite: x   Leuk Esterase: x / RBC: x / WBC x   Sq Epi: x / Non Sq Epi: x / Bacteria: x        RADIOLOGY & ADDITIONAL TESTS:      < from: Upper Endoscopy (02.12.24 @ 12:06) >    [facility]  ____________________________________________________________________________________________________  Patient Name: Jean Claude Velázquez                    MRN: [id number]  Account Number: [id number]                     YOB: 1955  Room: Endoscopy Room 3                           Gender: Male  Attending MD: Ela Paige ,                    Procedure Date No Time: 2/12/2024  ____________________________________________________________________________________________________     Procedure:           Upper GI endoscopy  Indications:         Melena  Providers:           Ela Paige  Medicines:           Monitored Anesthesia Care  Complications:       No immediate complications.  ____________________________________________________________________________________________________  Procedure:           Pre-Anesthesia Assessment:                       - Prior to the procedure, a History and Physical was performed, and patient                        medications, allergies and sensitivities were reviewed. The patient's                        tolerance of previous anesthesia was reviewed.                       - The risks and benefits of the procedure and the sedation options and risks            were discussed with the patient. All questions were answered and informed                        consent was obtained.                       - Abdominal Examination: bowel sounds present, abdomen soft and non-tender,                        no masses or organomegaly noted.                       - ASA Grade Assessment: III - A patient with severe systemic disease.                       After obtaining informed consent, the endoscope was passed under direct                        vision. Throughout the procedure, the patient's blood pressure, pulse, and                        oxygen saturations were monitored continuously. The was introduced through                        the mouth, and advanced to the second part of duodenum. The upperGI                        endoscopy was accomplished without difficulty. The patient tolerated the                        procedure well.                                                                                                        Findings:       The examined esophagus was normal.       The entire examined stomach was normal.       The duodenal bulb and second portion of the duodenum were normal.             Impression:          - Normal esophagus.                       - Normal stomach.                       - Normal duodenal bulb and second portion of the duodenum.                       - No etiology identified to explain this patients melena.  Recommendation:      - [facility].                       - Resume regular diet today.                       - OK to resume AC/antiplatelet therapy                       - trend CBC                                                                              _______________  Ela Paige,   2/12/2024 1:57:51 PM  This report has been signed electronically.  Number of Addenda: 0    Note Initiated On: 2/12/2024 12:06 PM    < end of copied text >

## 2024-02-13 NOTE — DIETITIAN INITIAL EVALUATION ADULT - MALNUTRITION
Vital Signs Last 24 Hrs  T(C): 36.8 (09-17-22 @ 07:32), Max: 37 (09-16-22 @ 21:57)  T(F): 98.3 (09-17-22 @ 07:32), Max: 98.6 (09-16-22 @ 21:57)  HR: 95 (09-17-22 @ 07:32) (74 - 105)  BP: 134/87 (09-17-22 @ 07:32) (124/81 - 144/89)  BP(mean): --  RR: 20 (09-17-22 @ 07:32) (16 - 20)  SpO2: 98% (09-17-22 @ 07:32) (95% - 100%)    GENERAL: Comfortable, No distress  HEAD:  Atraumatic, Normocephalic  EYES: EOMI, PERRLA, conjunctiva and sclera clear  NECK: Supple, No JVD, Normal thyroid  NERVOUS SYSTEM:  Alert & Oriented X 3, Motor Strength 5/5 B/L upper and lower extremities; DTRs 2+ intact and symmetric  CHEST/LUNG: CTA bilaterally; No rales, rhonchi, wheezing, or rubs  HEART: Regular rate and rhythm; No murmurs, rubs, or gallops  ABDOMEN: Soft, Nontender, Nondistended; Bowel sounds present  EXTREMITIES:  2+ Peripheral Pulses, No clubbing, cyanosis, or edema  SKIN: No rashes or lesions Moderate protein calorie malnutrition in context of acute illness

## 2024-02-13 NOTE — PROVIDER CONTACT NOTE (OTHER) - ASSESSMENT
Vss pt restingt in bed.
Pt sitting up in bed, yelling that he has chest pain. HR max 148, afib on the monitor. Vital signs as per flow sheet.
pt complaining of chest pain 4/10, worsening due to frequent bowel movements from colonoscopy prep. /73 HR 99

## 2024-02-13 NOTE — DIETITIAN INITIAL EVALUATION ADULT - OTHER CALCULATIONS
Used current dosing wt of 59.4 kg (2/12) for caloric/protein needs in consideration of advanced age, CHF, BARRY on CKD Stage 3-4, pressure injury.   Defer fluid needs to team secondary to CHF.

## 2024-02-13 NOTE — DIETITIAN INITIAL EVALUATION ADULT - NS FNS DIET ORDER
Diet, Clear Liquid (02-13-24 @ 12:18)  Diet, NPO after Midnight:      NPO Start Date: 13-Feb-2024,   NPO Start Time: 23:59 (02-13-24 @ 11:38)  Diet, NPO after Midnight:      NPO Start Date: 12-Feb-2024,   NPO Start Time: 23:59  Except Medications (02-12-24 @ 23:52)

## 2024-02-13 NOTE — DIETITIAN INITIAL EVALUATION ADULT - ENERGY INTAKE
- Pt currently NPO secondary to capsule endoscopy study today (2/13). Prior, pt endorsed fair appetite/PO intake; per nursing documentation, pt consumed ~50% of one meal yesterday (2/12). Upon PO diet advancement, pt receptive to trialing [employer] Glucose Support Protein Shake (per serving provides 16 g PRO, 300 kcal) 1x/day to optimize protein intake. Fair (50-75%)

## 2024-02-13 NOTE — PROGRESS NOTE ADULT - ASSESSMENT
#ASHD s/p multiple pci, readmitted with chest discomfort and +troponin.   # HFrEF (EF~50%, 11/26/23)  #A-fib  #HTN  #HL  #T2DM  #CKD   #drop h/h: off plavix, maintain asa.   No chest pain. GI team at bedside about to apply capsule, as egd unremarkable. Transfused overnight.  Stable at present cv perspective.

## 2024-02-13 NOTE — DIETITIAN INITIAL EVALUATION ADULT - PERTINENT LABORATORY DATA
02-13    137  |  107  |  40<H>  ----------------------------<  180<H>  4.2   |  17<L>  |  1.96<H>    Ca    8.9      13 Feb 2024 08:19    TPro  5.4<L>  /  Alb  3.4  /  TBili  2.3<H>  /  DBili  x   /  AST  91<H>  /  ALT  80<H>  /  AlkPhos  95  02-13  POCT Blood Glucose.: 153 mg/dL (02-13-24 @ 09:10)  A1C with Estimated Average Glucose Result: 6.7 % (01-29-24 @ 02:59)  A1C with Estimated Average Glucose Result: 6.6 % (11-25-23 @ 05:27)  A1C with Estimated Average Glucose Result: 8.4 % (05-05-23 @ 07:28)

## 2024-02-13 NOTE — DIETITIAN INITIAL EVALUATION ADULT - REASON INDICATOR FOR ASSESSMENT
RD Consult Indicated for: Pressure Injury Stage 2 or >  Source: Pt, Team, Electronic Medical Record   Chart reviewed, events noted.

## 2024-02-13 NOTE — PROGRESS NOTE ADULT - ASSESSMENT
70 y/o male with hx of CAD s/p multiple  stents (most recent to pLM (70%), pLAD, mLAD on 1/8/24 with Dr. Dominguez), HFrEF (EF~50% as of 11/26/23), A-fib, HTN, HLD, T2DM, CKD3, former smoker admitted with recurrent cp similar to his pain from cad ..  denies fever chiills  cough   n/v/d       Angina / NSTEMI : d/w Dr. Rivas and Dr. Deleon   holding plavix and AC   on asa   consult interventional and CTS :  cont medical management     acute anemia sec to acute GIB:  ppi   s/p transfusion   fu w GI : EGD : no acute pathoology  capsule planned today   and will plan colonoscopy      elevated LFT : monitor on lipitor     BARRY on ckd : hold lasix  faxiga     DM monitor fs

## 2024-02-13 NOTE — PROVIDER CONTACT NOTE (OTHER) - ACTION/TREATMENT ORDERED:
Provider made aware, EKG performed. SL Nitroglycerin 1x dose ordered for pain relief. Recheck BP in 30min post administration.
PETEY Dean called to make aware. Nitroglycerin sublingual 0.4mg given with relief after 2 tablets. Vital signs recorded in flowsheets. STAT labs sent. EKG completed. Lopressor 25mg given as per MAR.
Will continue to monitor.

## 2024-02-13 NOTE — PROGRESS NOTE ADULT - SUBJECTIVE AND OBJECTIVE BOX
Date of service: 02-13-24 @ 07:51      Patient is a 69y old  Male who presents with a chief complaint of Atherosclerosis of native coronary artery without angina pectoris     (13 Feb 2024 12:28)                                                               INTERVAL HPI/OVERNIGHT EVENTS:    REVIEW OF SYSTEMS:     CONSTITUTIONAL: No weakness, fevers or chills  EYES/ENT: No visual changes , no ear ache   NECK: No pain or stiffness  RESPIRATORY: No cough, wheezing,  No shortness of breath  CARDIOVASCULAR: No chest pain or palpitations  GASTROINTESTINAL: No abdominal pain  . No nausea, vomiting, or hematemesis; No diarrhea or constipation. No melena or hematochezia.  GENITOURINARY: No dysuria, frequency or hematuria  NEUROLOGICAL: No numbness or weakness  SKIN: No itching, burning, rashes, or lesions                                                                                                                                                                                                                                                                                 Medications:  MEDICATIONS  (STANDING):  acetaminophen   IVPB .. 1000 milliGRAM(s) IV Intermittent once  aspirin enteric coated 81 milliGRAM(s) Oral daily  atorvastatin 80 milliGRAM(s) Oral at bedtime  dextrose 5%. 1000 milliLiter(s) (100 mL/Hr) IV Continuous <Continuous>  dextrose 5%. 1000 milliLiter(s) (50 mL/Hr) IV Continuous <Continuous>  dextrose 50% Injectable 25 Gram(s) IV Push once  dextrose 50% Injectable 12.5 Gram(s) IV Push once  dextrose 50% Injectable 25 Gram(s) IV Push once  glucagon  Injectable 1 milliGRAM(s) IntraMuscular once  influenza  Vaccine (HIGH DOSE) 0.7 milliLiter(s) IntraMuscular once  insulin glargine Injectable (LANTUS) 15 Unit(s) SubCutaneous at bedtime  insulin lispro (ADMELOG) corrective regimen sliding scale   SubCutaneous three times a day before meals  isosorbide   dinitrate Tablet (ISORDIL) 20 milliGRAM(s) Oral three times a day  metoprolol tartrate 25 milliGRAM(s) Oral two times a day  pantoprazole    Tablet 40 milliGRAM(s) Oral two times a day  sacubitril 24 mG/valsartan 26 mG 1 Tablet(s) Oral two times a day    MEDICATIONS  (PRN):  acetaminophen     Tablet .. 650 milliGRAM(s) Oral every 6 hours PRN Mild Pain (1 - 3), Moderate Pain (4 - 6)  dextrose Oral Gel 15 Gram(s) Oral once PRN Blood Glucose LESS THAN 70 milliGRAM(s)/deciliter       Allergies    No Known Allergies    Intolerances      Vital Signs Last 24 Hrs    T(C): 36.9 (02-13-24 @ 04:00), Max: 36.9 (02-12-24 @ 12:25)  HR: 101 (02-13-24 @ 04:00) (70 - 101)  BP: 138/65 (02-13-24 @ 04:00) (113/55 - 175/68)  RR: 18 (02-13-24 @ 04:00) (16 - 20)  SpO2: 92% (02-13-24 @ 04:00) (92% - 100%)  Wt(kg): --  I&O's Summary    12 Feb 2024 07:01  -  13 Feb 2024 07:00  --------------------------------------------------------  IN: 220 mL / OUT: 350 mL / NET: -130 mL    13 Feb 2024 07:01  -  13 Feb 2024 10:04  --------------------------------------------------------  IN: 0 mL / OUT: 800 mL / NET: -800 mL      Height (cm): 167.6 (02-12 @ 13:17)  Weight (kg): 59.4 (02-12 @ 13:17)  BMI (kg/m2): 21.1 (02-12 @ 13:17)  BSA (m2): 1.67 (02-12 @ 13:17)  Physical Exam:    Daily     Daily   General:  Well appearing, NAD, not cachetic  HEENT:  Nonicteric, PERRLA  CV:  RRR, S1S2   Lungs:  CTA B/L, no wheezes, rales, rhonchi  Abdomen:  Soft, non-tender, no distended, positive BS  Extremities:  2+ pulses, no c/c, no edema  Skin:  Warm and dry, no rashes  :  No moreno  Neuro:  AAOx3, non-focal, grossly intact                                                                                                                                                                                                                                                                                                LABS:                                     10.9   10.03 )-----------( 159      ( 13 Feb 2024 08:19 )             31.6     02-13    137  |  107  |  40<H>  ----------------------------<  180<H>  4.2   |  17<L>  |  1.96<H>    Ca    8.9      13 Feb 2024 08:19    TPro  5.4<L>  /  Alb  3.4  /  TBili  2.3<H>  /  DBili  x   /  AST  91<H>  /  ALT  80<H>  /  AlkPhos  95  02-13          Discussed with :     Derek consultants' Notes   Time spent :

## 2024-02-13 NOTE — DIETITIAN INITIAL EVALUATION ADULT - SIGNS/SYMPTOMS
< 75% EER x > 7 days, wt loss >5% x 1 mos, moderate muscle wasting R heel suspected DTI per nursing documentation

## 2024-02-13 NOTE — DIETITIAN INITIAL EVALUATION ADULT - OTHER INFO
GI:  - Acute GIB s/p EGD yesterday (2/12); pending capsule study today (2/13).   - Pt reports no N/V nor diarrhea or constipation today. Last BM on 2/10 (dark red); not ordered for bowel regimen.   - Ordered for PPI.     Cardiac:  - CHF.     Endo:  - T2DM. HgbA1C of 6.7% (1/29) reflects fair glycemic control in consideration of age. Slightly elevated POCTs x 24 hrs: 117 - 184 mg/dL. In-house insulin regimen: Lantus 15 units 1x, ADMELOG SSI. RD will continue to monitor blood glucose levels prn.     Renal:  - BARRY on CKD Stage 3-4. Noted Na, K+ WNL today (2/13). RD will continue to monitor electrolytes prn.    Wt Hx:  - Pt reports UBW of 63.6 kg; endorses weight stable, however, ? complete accuracy. Per chart, dosing wt of 59.4 kg (2/12).  - Wt hx in kg ([facility]) as follows: 59.2 (2/8/24), 62.1 (1/28/24), 61.7 (1/5/24), 63.5 (11/27/23), 63.5 (7/30/23), 60.3 (5/31/23), 68 (5/4/23), 67.1 (3/10/23), 65.3 (2/28/22), 68 (9/5/21). Noted hx of weight fluctuations likely in setting of CHF, fluid shifts; overall suspected wt loss of 6.5% x 2.5 mos (clinically significant, based on wts from 11/27/23 and 2/12/24) likely secondary to fluctuating PO intake, diuresis, and increased nutrient needs. RD will continue to monitor weight trends as available/able.   - IBW: 64.5 kg (based on ht of 66 in)

## 2024-02-13 NOTE — DIETITIAN INITIAL EVALUATION ADULT - NSFNSADHERENCEPTAFT_GEN_A_CORE
- Hx of T2DM; reports being aware of limiting concentrated sweets/sugar sweetened beverages; aims to choose no sugar/zero sugar alternatives. [id number] hx as follows: 6.6% (11/25/23), 8.2% (5/4/23), 8.8% (2/27/22) - reflects gradual improvement in glycemic control. Endorses checking blood glucose levels 1x/day before bedtime with average readings ~200 mg/dL. Takes Semaglee 28 units 1x/day in PM.  - CKD Stage 3-4/CHF; monitors sodium intake; minimizes adding extra salt to meals; weighs himself infrequently. See education provided below.

## 2024-02-13 NOTE — PROGRESS NOTE ADULT - SUBJECTIVE AND OBJECTIVE BOX
SUBJECTIVE: No chest pain. GI team at bedside about to apply capsule, as egd unremarkable. Transfused overnight.  	  MEDICATIONS:  isosorbide   dinitrate Tablet (ISORDIL) 20 milliGRAM(s) Oral three times a day  metoprolol tartrate 25 milliGRAM(s) Oral two times a day  sacubitril 24 mG/valsartan 26 mG 1 Tablet(s) Oral two times a day  acetaminophen     Tablet .. 650 milliGRAM(s) Oral every 6 hours PRN  acetaminophen   IVPB .. 1000 milliGRAM(s) IV Intermittent once  pantoprazole  Injectable 80 milliGRAM(s) IV Push once  pantoprazole Infusion 8 mG/Hr IV Continuous <Continuous>  atorvastatin 80 milliGRAM(s) Oral at bedtime  dextrose 50% Injectable 25 Gram(s) IV Push once  dextrose 50% Injectable 12.5 Gram(s) IV Push once  dextrose 50% Injectable 25 Gram(s) IV Push once  dextrose Oral Gel 15 Gram(s) Oral once PRN  glucagon  Injectable 1 milliGRAM(s) IntraMuscular once  insulin glargine Injectable (LANTUS) 15 Unit(s) SubCutaneous at bedtime  insulin lispro (ADMELOG) corrective regimen sliding scale   SubCutaneous three times a day before meals  aspirin enteric coated 81 milliGRAM(s) Oral daily  dextrose 5%. 1000 milliLiter(s) IV Continuous <Continuous>  dextrose 5%. 1000 milliLiter(s) IV Continuous <Continuous>  influenza  Vaccine (HIGH DOSE) 0.7 milliLiter(s) IntraMuscular once      REVIEW OF SYSTEMS:    CONSTITUTIONAL: No fever, weight loss, or fatigue  EYES: No eye pain, visual disturbances, or discharge  NECK: No pain or stiffness  RESPIRATORY: No cough, wheezing, chills or hemoptysis; No Shortness of Breath  CARDIOVASCULAR: No chest pain, palpitations, dizziness, or leg swelling  GASTROINTESTINAL: No abdominal or epigastric pain. No nausea, vomiting, or hematemesis; No diarrhea or constipation. No melena or hematochezia.  GENITOURINARY: No dysuria, frequency, hematuria, or incontinence  NEUROLOGICAL: No headaches, memory loss, loss of strength, numbness, or tremors  SKIN: No itching, burning, rashes, or lesions   LYMPH Nodes: No enlarged glands  MUSCULOSKELETAL: No joint pain or swelling; No muscle, back, or extremity pain  All other review of systems are negative.  	  PHYSICAL EXAM:  T(C): 36.9 (02-13-24 @ 04:00), Max: 36.9 (02-12-24 @ 12:25)  HR: 101 (02-13-24 @ 04:00) (70 - 101)  BP: 138/65 (02-13-24 @ 04:00) (113/55 - 175/68)  RR: 18 (02-13-24 @ 04:00) (16 - 20)  SpO2: 92% (02-13-24 @ 04:00) (92% - 100%)  Wt(kg): --  I&O's Summary    12 Feb 2024 07:01  -  13 Feb 2024 07:00  --------------------------------------------------------  IN: 220 mL / OUT: 350 mL / NET: -130 mL    13 Feb 2024 07:01  -  13 Feb 2024 10:04  --------------------------------------------------------  IN: 0 mL / OUT: 800 mL / NET: -800 mL      Height (cm): 167.6 (02-12 @ 13:17)  Weight (kg): 59.4 (02-12 @ 13:17)  BMI (kg/m2): 21.1 (02-12 @ 13:17)  BSA (m2): 1.67 (02-12 @ 13:17)    PHYSICAL EXAM    Appearance: Normal	  HEENT:   Normal oral mucosa, PERRL, EOMI	  NECK: Soft and supple, No LAD, No JVD  Cardiovascular: Regular Rate and Rhythm, Normal S1 S2, No murmurs, No clicks, gallops or rubs  Respiratory: Lungs clear to auscultation	  Extremities: No clubbing, cyanosis or edema    LABS:	 	               10.9   10.03 )-----------( 159      ( 13 Feb 2024 08:19 )             31.6     02-13    137  |  107  |  40<H>  ----------------------------<  180<H>  4.2   |  17<L>  |  1.96<H>    Ca    8.9      13 Feb 2024 08:19    TPro  5.4<L>  /  Alb  3.4  /  TBili  2.3<H>  /  DBili  x   /  AST  91<H>  /  ALT  80<H>  /  AlkPhos  95  02-13

## 2024-02-13 NOTE — DIETITIAN INITIAL EVALUATION ADULT - ORAL NUTRITION SUPPLEMENTS
Upon PO diet advancement, consider adding [employer] Glucose Support Protein Shake (per serving provides 16 g PRO, 300 kcal) 1x/day to optimize protein intake.

## 2024-02-13 NOTE — PROVIDER CONTACT NOTE (OTHER) - RECOMMENDATIONS
Provider aware will continue to encourage pt regarding PPI
PRN Nitroglycerin, labs, vital signs.
Provider made aware, obtain EKG.

## 2024-02-14 LAB
GLUCOSE BLDC GLUCOMTR-MCNC: 186 MG/DL — HIGH (ref 70–99)
GLUCOSE BLDC GLUCOMTR-MCNC: 73 MG/DL — SIGNIFICANT CHANGE UP (ref 70–99)
GLUCOSE BLDC GLUCOMTR-MCNC: 78 MG/DL — SIGNIFICANT CHANGE UP (ref 70–99)
GLUCOSE BLDC GLUCOMTR-MCNC: 95 MG/DL — SIGNIFICANT CHANGE UP (ref 70–99)
HCT VFR BLD CALC: 30.6 % — LOW (ref 39–50)
HGB BLD-MCNC: 10.4 G/DL — LOW (ref 13–17)
MCHC RBC-ENTMCNC: 30.7 PG — SIGNIFICANT CHANGE UP (ref 27–34)
MCHC RBC-ENTMCNC: 34 GM/DL — SIGNIFICANT CHANGE UP (ref 32–36)
MCV RBC AUTO: 90.3 FL — SIGNIFICANT CHANGE UP (ref 80–100)
NRBC # BLD: 0 /100 WBCS — SIGNIFICANT CHANGE UP (ref 0–0)
PLATELET # BLD AUTO: 181 K/UL — SIGNIFICANT CHANGE UP (ref 150–400)
RBC # BLD: 3.39 M/UL — LOW (ref 4.2–5.8)
RBC # FLD: 15.2 % — HIGH (ref 10.3–14.5)
WBC # BLD: 8.9 K/UL — SIGNIFICANT CHANGE UP (ref 3.8–10.5)
WBC # FLD AUTO: 8.9 K/UL — SIGNIFICANT CHANGE UP (ref 3.8–10.5)

## 2024-02-14 PROCEDURE — 88305 TISSUE EXAM BY PATHOLOGIST: CPT | Mod: 26

## 2024-02-14 DEVICE — CLIP RESOLUTION 360 235CM: Type: IMPLANTABLE DEVICE | Status: FUNCTIONAL

## 2024-02-14 DEVICE — NET RETRV ROT ROTH 2.5MMX230CM: Type: IMPLANTABLE DEVICE | Status: FUNCTIONAL

## 2024-02-14 RX ORDER — INSULIN GLARGINE 100 [IU]/ML
10 INJECTION, SOLUTION SUBCUTANEOUS AT BEDTIME
Refills: 0 | Status: DISCONTINUED | OUTPATIENT
Start: 2024-02-14 | End: 2024-02-15

## 2024-02-14 RX ORDER — CLOPIDOGREL BISULFATE 75 MG/1
75 TABLET, FILM COATED ORAL DAILY
Refills: 0 | Status: DISCONTINUED | OUTPATIENT
Start: 2024-02-14 | End: 2024-02-15

## 2024-02-14 RX ADMIN — CLOPIDOGREL BISULFATE 75 MILLIGRAM(S): 75 TABLET, FILM COATED ORAL at 17:39

## 2024-02-14 RX ADMIN — PANTOPRAZOLE SODIUM 40 MILLIGRAM(S): 20 TABLET, DELAYED RELEASE ORAL at 17:40

## 2024-02-14 RX ADMIN — Medication 81 MILLIGRAM(S): at 17:40

## 2024-02-14 RX ADMIN — Medication 25 MILLIGRAM(S): at 05:32

## 2024-02-14 RX ADMIN — ISOSORBIDE DINITRATE 20 MILLIGRAM(S): 5 TABLET ORAL at 05:32

## 2024-02-14 RX ADMIN — PANTOPRAZOLE SODIUM 40 MILLIGRAM(S): 20 TABLET, DELAYED RELEASE ORAL at 05:32

## 2024-02-14 RX ADMIN — INSULIN GLARGINE 10 UNIT(S): 100 INJECTION, SOLUTION SUBCUTANEOUS at 22:01

## 2024-02-14 RX ADMIN — SACUBITRIL AND VALSARTAN 1 TABLET(S): 24; 26 TABLET, FILM COATED ORAL at 17:40

## 2024-02-14 RX ADMIN — ISOSORBIDE DINITRATE 20 MILLIGRAM(S): 5 TABLET ORAL at 10:59

## 2024-02-14 RX ADMIN — Medication 25 MILLIGRAM(S): at 17:39

## 2024-02-14 RX ADMIN — ISOSORBIDE DINITRATE 20 MILLIGRAM(S): 5 TABLET ORAL at 17:39

## 2024-02-14 RX ADMIN — ATORVASTATIN CALCIUM 80 MILLIGRAM(S): 80 TABLET, FILM COATED ORAL at 22:01

## 2024-02-14 RX ADMIN — SACUBITRIL AND VALSARTAN 1 TABLET(S): 24; 26 TABLET, FILM COATED ORAL at 05:32

## 2024-02-14 NOTE — PROGRESS NOTE ADULT - SUBJECTIVE AND OBJECTIVE BOX
Overnight events noted      VITAL:  T(C): , Max: 37.1 (02-13-24 @ 16:20)  T(F): , Max: 98.7 (02-13-24 @ 16:20)  HR: 70 (02-14-24 @ 04:00)  BP: 126/77 (02-14-24 @ 04:00)  BP(mean): --  RR: 18 (02-14-24 @ 04:00)  SpO2: 98% (02-14-24 @ 04:00)  Wt(kg): --      PHYSICAL EXAM:  Constitutional: NAD, Alert  HEENT: NCAT, MMM  Neck: Supple, No JVD  Respiratory: CTA-b/l  Cardiovascular: irreg s1s2  Gastrointestinal: BS+, soft, NT/ND  Extremities: No peripheral edema b/l  Neurological: no focal deficits; strength grossly intact  Back: no CVAT b/l  Skin: No rashes, no nevi    LABS:                        10.4   8.90  )-----------( 181      ( 14 Feb 2024 07:09 )             30.6     Na(137)/K(4.2)/Cl(107)/HCO3(17)/BUN(40)/Cr(1.96)Glu(180)/Ca(8.9)/Mg(--)/PO4(--)    02-13 @ 08:19  Na(138)/K(4.2)/Cl(107)/HCO3(20)/BUN(51)/Cr(2.10)Glu(124)/Ca(9.1)/Mg(--)/PO4(--)    02-12 @ 07:19      IMPRESSION: 69M w/ HTN, DM2, CAD, AFib, and CKD, s/p multiple admissions of late for chest pain/NSTEMI; 2/9/24 p/w chest pain/NSTEMI    (1)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  (2)BARRY - prerenal on admission; now resolved  (3)Metabolic acidosis - renally mediated - we can simply follow for now  (4)Chest pain - recurrent chest pain from recurrent small NSTEMIs. For medical management. Cardiology input appreciated - potentially for increase in beta-blockade dosage. We can therefore hold off with increase in Entresto  (5)GI - GIB - potentially for colonoscopy today    RECOMMEND:  (1)Uptitration of beta-blockade per Cardiology  (2)Colonoscopy per GI  (2)Dose new meds for GFR 25-30ml/min                Markell Walton MD  [facility]  Office/on call physician: [phone]  Cell (7a-7p): [phone]       no chest pain, no sob      VITAL:  T(C): , Max: 37.1 (02-13-24 @ 16:20)  T(F): , Max: 98.7 (02-13-24 @ 16:20)  HR: 70 (02-14-24 @ 04:00)  BP: 126/77 (02-14-24 @ 04:00)  BP(mean): --  RR: 18 (02-14-24 @ 04:00)  SpO2: 98% (02-14-24 @ 04:00)  Wt(kg): --      PHYSICAL EXAM:  Constitutional: NAD, Alert  HEENT: NCAT, MMM  Neck: Supple, No JVD  Respiratory: CTA-b/l  Cardiovascular: irreg s1s2  Gastrointestinal: BS+, soft, NT/ND  Extremities: No peripheral edema b/l  Neurological: no focal deficits; strength grossly intact  Back: no CVAT b/l  Skin: No rashes, no nevi    LABS:                        10.4   8.90  )-----------( 181      ( 14 Feb 2024 07:09 )             30.6     Na(137)/K(4.2)/Cl(107)/HCO3(17)/BUN(40)/Cr(1.96)Glu(180)/Ca(8.9)/Mg(--)/PO4(--)    02-13 @ 08:19  Na(138)/K(4.2)/Cl(107)/HCO3(20)/BUN(51)/Cr(2.10)Glu(124)/Ca(9.1)/Mg(--)/PO4(--)    02-12 @ 07:19      IMPRESSION: 69M w/ HTN, DM2, CAD, AFib, and CKD, s/p multiple admissions of late for chest pain/NSTEMI; 2/9/24 p/w chest pain/NSTEMI    (1)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  (2)BARRY - prerenal on admission; now resolved  (3)Metabolic acidosis - renally mediated - we can simply follow for now  (4)Chest pain - recurrent chest pain from recurrent small NSTEMIs. For medical management. Cardiology input appreciated - potentially for increase in beta-blockade dosage. We can therefore hold off with increase in Entresto  (5)GI - GIB - potentially for colonoscopy today    RECOMMEND:  (1)Uptitration of beta-blockade per Cardiology  (2)Colonoscopy per GI  (2)Dose new meds for GFR 25-30ml/min                Markell Walton MD  [facility]  Office/on call physician: [phone]  Cell (7a-7p): [phone]

## 2024-02-14 NOTE — PRE-ANESTHESIA EVALUATION ADULT - NSANTHPMHFT_GEN_ALL_CORE
70 y/o male with hx of CAD s/p multiple  stents (most recent to pLM (70%), pLAD, mLAD on 1/8/24 with Dr. Dominguez), HFrEF (EF~50% as of 11/26/23), A-fib, HTN, HLD, T2DM, CKD3, former smoker admitted with recurrent cp similar to his pain from cad  Noted to have rectal bleeding - s/p EGD with no findings. Today for colonoscopy.  Of note - has been experiencing chest pain from coronary disease. Cath done, cardiology following.

## 2024-02-14 NOTE — PROGRESS NOTE ADULT - ASSESSMENT
1. CAD s/p PCI, NSTEMI  on ASA, plavix and heparin on hold given recent bleed   per cardiology    2. GI Bleed  EGD 2/12/24 normal   Capsule endoscopy reviewed - normal small bowel  plan for colonoscopy today     3. CHF    4. AFib      I had a prolonged conversation with the patient regarding the hospital course, differential diagnosis, results of diagnostic tests this far, and therapeutic modalities available. Plan of care discussed with the patient after the evaluation. Patient expresses a clear understanding of the plan of care.    Donte Paige D.O.  Gastroenterology and Hepatology  [address] [facility], [address]  [place], [place]  Office: [phone]

## 2024-02-14 NOTE — PRE PROCEDURE NOTE - PRE PROCEDURE EVALUATION
Attending Physician:  Dr Paige                          Procedure: EGD    Indication for Procedure: GIB  ________________________________________________________  PAST MEDICAL & SURGICAL HISTORY:  Former smoker      CAD in native artery      Type 2 diabetes mellitus      Hyperlipidemia, unspecified hyperlipidemia type      Essential hypertension, hypertension with unspecified goal      Afib      Hematoma of leg      Stented coronary artery      CHF (congestive heart failure)      s/p Carotid Endarterectomy  left        ALLERGIES:  No Known Allergies    HOME MEDICATIONS:  acetaminophen 325 mg oral tablet: 2 tab(s) orally every 6 hours As needed Temp greater or equal to 38C (100.4F), Moderate Pain (4 - 6)  atorvastatin 80 mg oral tablet: 1 tab(s) orally once a day (at bedtime)  clopidogrel 75 mg oral tablet: 1 tab(s) orally once a day  Entresto 49 mg-51 mg oral tablet: 1 tab(s) orally 2 times a day  ezetimibe 10 mg oral tablet: 1 tab(s) orally once a day  Farxiga 10 mg oral tablet: 1 tab(s) orally once a day  furosemide 40 mg oral tablet: 1 tab(s) orally once a day  multivitamin gummies: 1 gummy orally once a day  Semglee (Prefilled Pen) 100 units/mL subcutaneous solution: 28 unit(s) subcutaneous once a day (at bedtime)    AICD/PPM: [ ] yes   [X ] no    PERTINENT LAB DATA:                        8.9    10.73 )-----------( 174      ( 12 Feb 2024 07:17 )             26.6     02-12    138  |  107  |  51<H>  ----------------------------<  124<H>  4.2   |  20<L>  |  2.10<H>    Ca    9.1      12 Feb 2024 07:19        CARDIAC MARKERS ( 11 Feb 2024 13:19 )  x     / x     / 1318 U/L / x     / 21.0 ng/mL            PHYSICAL EXAMINATION:      Weight (kg): 59.4T(C): 36.7  HR: 77  BP: 128/70  RR: 19  SpO2: 100%    Constitutional: NAD    Neck:  No JVD  Respiratory: CTAB/L,   Cardiovascular: S1 and S2, RRR  Gastrointestinal: BS+, soft, NT/ND  Extremities: No peripheral edema  Neurological: A/O x 4, no focal deficits        COMMENTS:  Anesthesia reviewed case.  1 unit PRBC hanging as per anesthesia request      
Attending Physician:        aramis alexander                    Procedure: colonoscopy    Indication for Procedure: anemia, rectal bleeding   ________________________________________________________  PAST MEDICAL & SURGICAL HISTORY:  Former smoker      CAD in native artery      Type 2 diabetes mellitus      Hyperlipidemia, unspecified hyperlipidemia type      Essential hypertension, hypertension with unspecified goal      Afib      Hematoma of leg      Stented coronary artery      CHF (congestive heart failure)      s/p Carotid Endarterectomy  left        ALLERGIES:  No Known Allergies    HOME MEDICATIONS:  acetaminophen 325 mg oral tablet: 2 tab(s) orally every 6 hours As needed Temp greater or equal to 38C (100.4F), Moderate Pain (4 - 6)  atorvastatin 80 mg oral tablet: 1 tab(s) orally once a day (at bedtime)  clopidogrel 75 mg oral tablet: 1 tab(s) orally once a day  Entresto 49 mg-51 mg oral tablet: 1 tab(s) orally 2 times a day  ezetimibe 10 mg oral tablet: 1 tab(s) orally once a day  Farxiga 10 mg oral tablet: 1 tab(s) orally once a day  furosemide 40 mg oral tablet: 1 tab(s) orally once a day  multivitamin gummies: 1 gummy orally once a day  Semglee (Prefilled Pen) 100 units/mL subcutaneous solution: 28 unit(s) subcutaneous once a day (at bedtime)    AICD/PPM: [x ] yes   [ ] no    PERTINENT LAB DATA:                        10.4   8.90  )-----------( 181      ( 14 Feb 2024 07:09 )             30.6     02-13    137  |  107  |  40<H>  ----------------------------<  180<H>  4.2   |  17<L>  |  1.96<H>    Ca    8.9      13 Feb 2024 08:19    TPro  5.4<L>  /  Alb  3.4  /  TBili  2.3<H>  /  DBili  x   /  AST  91<H>  /  ALT  80<H>  /  AlkPhos  95  02-13                PHYSICAL EXAMINATION:    T(C): 36.8  HR: 70  BP: 126/77  RR: 18  SpO2: 98%    Constitutional: NAD  HEENT: PERRLA, EOMI,    Neck:  No JVD  Respiratory: CTAB/L  Cardiovascular: S1 and S2  Gastrointestinal: BS+, soft, NT/ND  Extremities: No peripheral edema  Neurological: A/O x 3, no focal deficits  Psychiatric: Normal mood, normal affect  Skin: No rashes    ASA Class: I [ ]  II [ ]  III [x ]  IV [ ]    COMMENTS:    The patient is a suitable candidate for the planned procedure unless box checked [ ]  No, explain:

## 2024-02-14 NOTE — PROGRESS NOTE ADULT - ASSESSMENT
68 y/o male with hx of CAD s/p multiple  stents (most recent to pLM (70%), pLAD, mLAD on 1/8/24 with Dr. Dominguez), HFrEF (EF~50% as of 11/26/23), A-fib, HTN, HLD, T2DM, CKD3, former smoker admitted with recurrent cp similar to his pain from cad ..  denies fever chiills  cough   n/v/d       Angina / NSTEMI : d/w Dr. Rivas and Dr. Deleon   holding plavix and AC   on asa   consult interventional and CTS :  cont medical management     acute anemia sec to acute GIB:  ppi   s/p transfusion   fu w GI : EGD : no acute pathoology  capsule: fu results   and will plan colonoscopy      elevated LFT : monitor on lipitor     BARRY on ckd : hold lasix  faxiga     DM monitor fs     discussed with Pt ACP : full code

## 2024-02-14 NOTE — PROGRESS NOTE ADULT - SUBJECTIVE AND OBJECTIVE BOX
Chief complaint  Patient is a 69y old  Male who presents with a chief complaint of NSTEMI (14 Feb 2024 07:55)         Labs and Fingersticks  CAPILLARY BLOOD GLUCOSE      POCT Blood Glucose.: 95 mg/dL (14 Feb 2024 12:49)  POCT Blood Glucose.: 73 mg/dL (14 Feb 2024 08:50)  POCT Blood Glucose.: 167 mg/dL (13 Feb 2024 21:18)  POCT Blood Glucose.: 176 mg/dL (13 Feb 2024 17:11)      Anion Gap: 13 (02-13 @ 08:19)      Calcium: 8.9 (02-13 @ 08:19)  Albumin: 3.4 (02-13 @ 08:19)    Alanine Aminotransferase (ALT/SGPT): 80 *H* (02-13 @ 08:19)  Alkaline Phosphatase: 95 (02-13 @ 08:19)  Aspartate Aminotransferase (AST/SGOT): 91 *H* (02-13 @ 08:19)        02-13    137  |  107  |  40<H>  ----------------------------<  180<H>  4.2   |  17<L>  |  1.96<H>    Ca    8.9      13 Feb 2024 08:19    TPro  5.4<L>  /  Alb  3.4  /  TBili  2.3<H>  /  DBili  x   /  AST  91<H>  /  ALT  80<H>  /  AlkPhos  95  02-13                        10.4   8.90  )-----------( 181      ( 14 Feb 2024 07:09 )             30.6     Medications  MEDICATIONS  (STANDING):  acetaminophen   IVPB .. 1000 milliGRAM(s) IV Intermittent once  aspirin enteric coated 81 milliGRAM(s) Oral daily  atorvastatin 80 milliGRAM(s) Oral at bedtime  dextrose 5%. 1000 milliLiter(s) (100 mL/Hr) IV Continuous <Continuous>  dextrose 5%. 1000 milliLiter(s) (50 mL/Hr) IV Continuous <Continuous>  dextrose 50% Injectable 25 Gram(s) IV Push once  dextrose 50% Injectable 25 Gram(s) IV Push once  dextrose 50% Injectable 12.5 Gram(s) IV Push once  glucagon  Injectable 1 milliGRAM(s) IntraMuscular once  influenza  Vaccine (HIGH DOSE) 0.7 milliLiter(s) IntraMuscular once  insulin glargine Injectable (LANTUS) 10 Unit(s) SubCutaneous at bedtime  insulin lispro (ADMELOG) corrective regimen sliding scale   SubCutaneous three times a day before meals  isosorbide   dinitrate Tablet (ISORDIL) 20 milliGRAM(s) Oral three times a day  metoprolol tartrate 25 milliGRAM(s) Oral two times a day  pantoprazole    Tablet 40 milliGRAM(s) Oral two times a day  sacubitril 24 mG/valsartan 26 mG 1 Tablet(s) Oral two times a day      Physical Exam  General: Patient comfortable in bed   Vital Signs Last 12 Hrs  T(F): 97.9 (02-14-24 @ 12:09), Max: 98.3 (02-14-24 @ 04:00)  HR: 77 (02-14-24 @ 12:09) (70 - 78)  BP: 121/69 (02-14-24 @ 12:09) (121/69 - 128/76)  BP(mean): --  RR: 18 (02-14-24 @ 12:09) (18 - 18)  SpO2: 96% (02-14-24 @ 12:09) (96% - 98%)    CVS: S1S2   Respiratory: No wheezing, no crepitations  GI: Abdomen soft, bowel sounds positive  Musculoskeletal:  moves all extremities  : Voiding         Chief complaint  Patient is a 69y old  Male who presents with a chief complaint of NSTEMI (14 Feb 2024 07:55)     Labs and Fingersticks  CAPILLARY BLOOD GLUCOSE      POCT Blood Glucose.: 95 mg/dL (14 Feb 2024 12:49)  POCT Blood Glucose.: 73 mg/dL (14 Feb 2024 08:50)  POCT Blood Glucose.: 167 mg/dL (13 Feb 2024 21:18)  POCT Blood Glucose.: 176 mg/dL (13 Feb 2024 17:11)      Anion Gap: 13 (02-13 @ 08:19)      Calcium: 8.9 (02-13 @ 08:19)  Albumin: 3.4 (02-13 @ 08:19)    Alanine Aminotransferase (ALT/SGPT): 80 *H* (02-13 @ 08:19)  Alkaline Phosphatase: 95 (02-13 @ 08:19)  Aspartate Aminotransferase (AST/SGOT): 91 *H* (02-13 @ 08:19)        02-13    137  |  107  |  40<H>  ----------------------------<  180<H>  4.2   |  17<L>  |  1.96<H>    Ca    8.9      13 Feb 2024 08:19    TPro  5.4<L>  /  Alb  3.4  /  TBili  2.3<H>  /  DBili  x   /  AST  91<H>  /  ALT  80<H>  /  AlkPhos  95  02-13                        10.4   8.90  )-----------( 181      ( 14 Feb 2024 07:09 )             30.6     Medications  MEDICATIONS  (STANDING):  acetaminophen   IVPB .. 1000 milliGRAM(s) IV Intermittent once  aspirin enteric coated 81 milliGRAM(s) Oral daily  atorvastatin 80 milliGRAM(s) Oral at bedtime  dextrose 5%. 1000 milliLiter(s) (100 mL/Hr) IV Continuous <Continuous>  dextrose 5%. 1000 milliLiter(s) (50 mL/Hr) IV Continuous <Continuous>  dextrose 50% Injectable 25 Gram(s) IV Push once  dextrose 50% Injectable 25 Gram(s) IV Push once  dextrose 50% Injectable 12.5 Gram(s) IV Push once  glucagon  Injectable 1 milliGRAM(s) IntraMuscular once  influenza  Vaccine (HIGH DOSE) 0.7 milliLiter(s) IntraMuscular once  insulin glargine Injectable (LANTUS) 10 Unit(s) SubCutaneous at bedtime  insulin lispro (ADMELOG) corrective regimen sliding scale   SubCutaneous three times a day before meals  isosorbide   dinitrate Tablet (ISORDIL) 20 milliGRAM(s) Oral three times a day  metoprolol tartrate 25 milliGRAM(s) Oral two times a day  pantoprazole    Tablet 40 milliGRAM(s) Oral two times a day  sacubitril 24 mG/valsartan 26 mG 1 Tablet(s) Oral two times a day      Physical Exam  General: Patient comfortable in bed   Vital Signs Last 12 Hrs  T(F): 97.9 (02-14-24 @ 12:09), Max: 98.3 (02-14-24 @ 04:00)  HR: 77 (02-14-24 @ 12:09) (70 - 78)  BP: 121/69 (02-14-24 @ 12:09) (121/69 - 128/76)  BP(mean): --  RR: 18 (02-14-24 @ 12:09) (18 - 18)  SpO2: 96% (02-14-24 @ 12:09) (96% - 98%)    CVS: S1S2   Respiratory: No wheezing, no crepitations  GI: Abdomen soft, bowel sounds positive  Musculoskeletal:  moves all extremities  : Voiding

## 2024-02-14 NOTE — PACU DISCHARGE NOTE - PAIN:
Controlled with current regime
Controlled with current regime
Okay to send her reports as requested  Printed   
Controlled with current regime

## 2024-02-14 NOTE — PRE-ANESTHESIA EVALUATION ADULT - NSANTHOSAYNRD_GEN_A_CORE
No. YUN screening performed.  STOP BANG Legend: 0-2 = LOW Risk; 3-4 = INTERMEDIATE Risk; 5-8 = HIGH Risk
No. YUN screening performed.  STOP BANG Legend: 0-2 = LOW Risk; 3-4 = INTERMEDIATE Risk; 5-8 = HIGH Risk

## 2024-02-14 NOTE — PROGRESS NOTE ADULT - SUBJECTIVE AND OBJECTIVE BOX
Date of service: 02-14-24 @ 11:16      Patient is a 69y old  Male who presents with a chief complaint of NSTEMI (14 Feb 2024 07:55)                                                               INTERVAL HPI/OVERNIGHT EVENTS:    REVIEW OF SYSTEMS:     CONSTITUTIONAL: No weakness, fevers or chills  EYES/ENT: No visual changes , no ear ache   NECK: No pain or stiffness  RESPIRATORY: No cough, wheezing,  No shortness of breath  CARDIOVASCULAR: No chest pain or palpitations  GASTROINTESTINAL: No abdominal pain  . No nausea, vomiting, or hematemesis; No diarrhea or constipation. No melena or hematochezia.  GENITOURINARY: No dysuria, frequency or hematuria  NEUROLOGICAL: No numbness or weakness  SKIN: No itching, burning, rashes, or lesions                                                                                                                                                                                                                                                                                 Medications:  MEDICATIONS  (STANDING):  acetaminophen   IVPB .. 1000 milliGRAM(s) IV Intermittent once  aspirin enteric coated 81 milliGRAM(s) Oral daily  atorvastatin 80 milliGRAM(s) Oral at bedtime  dextrose 5%. 1000 milliLiter(s) (100 mL/Hr) IV Continuous <Continuous>  dextrose 5%. 1000 milliLiter(s) (50 mL/Hr) IV Continuous <Continuous>  dextrose 50% Injectable 25 Gram(s) IV Push once  dextrose 50% Injectable 12.5 Gram(s) IV Push once  dextrose 50% Injectable 25 Gram(s) IV Push once  glucagon  Injectable 1 milliGRAM(s) IntraMuscular once  influenza  Vaccine (HIGH DOSE) 0.7 milliLiter(s) IntraMuscular once  insulin glargine Injectable (LANTUS) 10 Unit(s) SubCutaneous at bedtime  insulin lispro (ADMELOG) corrective regimen sliding scale   SubCutaneous three times a day before meals  isosorbide   dinitrate Tablet (ISORDIL) 20 milliGRAM(s) Oral three times a day  metoprolol tartrate 25 milliGRAM(s) Oral two times a day  pantoprazole    Tablet 40 milliGRAM(s) Oral two times a day  sacubitril 24 mG/valsartan 26 mG 1 Tablet(s) Oral two times a day    MEDICATIONS  (PRN):  acetaminophen     Tablet .. 650 milliGRAM(s) Oral every 6 hours PRN Mild Pain (1 - 3), Moderate Pain (4 - 6)  dextrose Oral Gel 15 Gram(s) Oral once PRN Blood Glucose LESS THAN 70 milliGRAM(s)/deciliter       Allergies    No Known Allergies    Intolerances      Vital Signs Last 24 Hrs  T(C): 36.7 (14 Feb 2024 11:03), Max: 37.1 (13 Feb 2024 16:20)  T(F): 98 (14 Feb 2024 11:03), Max: 98.7 (13 Feb 2024 16:20)  HR: 78 (14 Feb 2024 11:03) (70 - 99)  BP: 128/76 (14 Feb 2024 11:03) (116/50 - 156/87)  BP(mean): --  RR: 18 (14 Feb 2024 11:03) (18 - 18)  SpO2: 98% (14 Feb 2024 11:03) (96% - 100%)    Parameters below as of 14 Feb 2024 11:03  Patient On (Oxygen Delivery Method): room air  O2 Flow (L/min): 19    CAPILLARY BLOOD GLUCOSE      POCT Blood Glucose.: 73 mg/dL (14 Feb 2024 08:50)  POCT Blood Glucose.: 167 mg/dL (13 Feb 2024 21:18)  POCT Blood Glucose.: 176 mg/dL (13 Feb 2024 17:11)  POCT Blood Glucose.: 157 mg/dL (13 Feb 2024 12:33)      02-13 @ 07:01  -  02-14 @ 07:00  --------------------------------------------------------  IN: 360 mL / OUT: 1400 mL / NET: -1040 mL      Physical Exam:    Daily     Daily   General:  Well appearing, NAD, not cachetic  HEENT:  Nonicteric, PERRLA  CV:  RRR, S1S2   Lungs:  CTA B/L, no wheezes, rales, rhonchi  Abdomen:  Soft, non-tender, no distended, positive BS  Extremities:  2+ pulses, no c/c, no edema  Skin:  Warm and dry, no rashes  :  No moreno  Neuro:  AAOx3, non-focal, grossly intact                                                                                                                                                                                                                                                                                                LABS:                               10.4   8.90  )-----------( 181      ( 14 Feb 2024 07:09 )             30.6                      02-13    137  |  107  |  40<H>  ----------------------------<  180<H>  4.2   |  17<L>  |  1.96<H>    Ca    8.9      13 Feb 2024 08:19    TPro  5.4<L>  /  Alb  3.4  /  TBili  2.3<H>  /  DBili  x   /  AST  91<H>  /  ALT  80<H>  /  AlkPhos  95  02-13                       RADIOLOGY & ADDITIONAL TESTS         I personally reviewed: [  ]EKG   [  ]CXR    [  ] CT      A/P:         Discussed with :     Derek consultants' Notes   Time spent :

## 2024-02-14 NOTE — PROGRESS NOTE ADULT - ASSESSMENT
68 y/o male with hx of CAD s/p multiple  stents (most recent to [facility] (70%), pLAD, mLAD on 1/8/24 with Dr. Dominguez), HFrEF , A-fib, HTN, HLD, T2DM,  CKD3, former smoker  Assessment  DMT2: 69y Male with DM T2 with hyperglycemia, A1C 6.7% , was on oral meds and insulin at home (Farxiga and Semglee basal insulin), on low dose insulin, blood sugars trending down. On liquid diet.   CAD: on medications, stable, monitored. CTS eval, recent stent.   HTN: on antihypertensive medications, monitored, asymptomatic.  CKD: Monitor labs/BMP,       Discussed plan and management with Dr Flo Jamil NP - TEAMS  Myrna Rossi MD  Cell: [phone]  Office: [phone]        68 y/o male with hx of CAD s/p multiple  stents (most recent to [facility] (70%), pLAD, mLAD on 1/8/24 with Dr. Dominguez), HFrEF , A-fib, HTN, HLD, T2DM,  CKD3, former smoker  Assessment  DMT2: 69y Male with DM T2 with hyperglycemia, A1C 6.7% , was on oral meds and insulin at home (Farxiga and Semglee basal insulin), on low dose insulin, blood sugars  trending down. On liquid diet.   CAD: on medications, stable, monitored. CTS eval, recent stent.   HTN: on antihypertensive medications, monitored, asymptomatic.  CKD: Monitor labs/BMP,       Discussed plan and management with Dr Flo Jamil NP - TEAMS  Myrna Rossi MD  Cell: [phone]  Office: [phone]

## 2024-02-14 NOTE — PROGRESS NOTE ADULT - SUBJECTIVE AND OBJECTIVE BOX
Chief Complaint:  Patient is a 69y old  Male who presents with a chief complaint of NSTEMI (2024 07:55)      Date of service 24 @ 13:21      Interval Events:   Hgb stable    Hospital Medications:  acetaminophen     Tablet .. 650 milliGRAM(s) Oral every 6 hours PRN  acetaminophen   IVPB .. 1000 milliGRAM(s) IV Intermittent once  aspirin enteric coated 81 milliGRAM(s) Oral daily  atorvastatin 80 milliGRAM(s) Oral at bedtime  dextrose 5%. 1000 milliLiter(s) IV Continuous <Continuous>  dextrose 5%. 1000 milliLiter(s) IV Continuous <Continuous>  dextrose 50% Injectable 25 Gram(s) IV Push once  dextrose 50% Injectable 12.5 Gram(s) IV Push once  dextrose 50% Injectable 25 Gram(s) IV Push once  dextrose Oral Gel 15 Gram(s) Oral once PRN  glucagon  Injectable 1 milliGRAM(s) IntraMuscular once  influenza  Vaccine (HIGH DOSE) 0.7 milliLiter(s) IntraMuscular once  insulin glargine Injectable (LANTUS) 10 Unit(s) SubCutaneous at bedtime  insulin lispro (ADMELOG) corrective regimen sliding scale   SubCutaneous three times a day before meals  isosorbide   dinitrate Tablet (ISORDIL) 20 milliGRAM(s) Oral three times a day  metoprolol tartrate 25 milliGRAM(s) Oral two times a day  pantoprazole    Tablet 40 milliGRAM(s) Oral two times a day  sacubitril 24 mG/valsartan 26 mG 1 Tablet(s) Oral two times a day        Review of Systems:  General:  No wt loss, fevers, chills, night sweats, fatigue,   Eyes:  Good vision, no reported pain  ENT:  No sore throat, pain, runny nose, dysphagia  CV:  No pain, palpitations, hypo/hypertension  Resp:  No dyspnea, cough, tachypnea, wheezing  GI:  See HPI  :  No pain, bleeding, incontinence, nocturia  Muscle:  No pain, weakness  Neuro:  No weakness, tingling, memory problems  Psych:  No fatigue, insomnia, mood problems, depression  Endocrine:  No polyuria, polydipsia, cold/heat intolerance  Heme:  No petechiae, ecchymosis, easy bruisability  Integumentary:  No rash, edema    PHYSICAL EXAM:   Vital Signs:  Vital Signs Last 24 Hrs  T(C): 36.6 (2024 12:09), Max: 37.1 (2024 16:20)  T(F): 97.9 (2024 12:09), Max: 98.7 (2024 16:20)  HR: 77 (2024 12:09) (70 - 99)  BP: 121/69 (2024 12:09) (121/69 - 156/87)  BP(mean): --  RR: 18 (2024 12:09) (18 - 18)  SpO2: 96% (2024 12:09) (96% - 100%)    Parameters below as of 2024 12:09  Patient On (Oxygen Delivery Method): room air      Daily     Daily Weight in k.8 (2024 10:00)      PHYSICAL EXAM:     GENERAL:  Appears stated age, well-groomed, well-nourished, no distress  HEENT:  NC/AT,  conjunctivae anicteric, clear and pink,   NECK: supple, trachea midline  CHEST:  Full & symmetric excursion, no increased effort, breath sounds clear  HEART:  Regular rhythm, no JVD  ABDOMEN:  Soft, non-tender, non-distended, normoactive bowel sounds,  no masses , no hepatosplenomegaly  EXTREMITIES:  no cyanosis,clubbing or edema  SKIN:  No rash, erythema, or, ecchymoses, no jaundice  NEURO:  Alert, non-focal, no asterixis  PSYCH: Appropriate affect, oriented to place and time  RECTAL: Deferred      LABS Personally reviewed by me:                        10.4   8.90  )-----------( 181      ( 2024 07:09 )             30.6     Mean Cell Volume: 90.3 fl (24 @ 07:09)    02-    137  |  107  |  40<H>  ----------------------------<  180<H>  4.2   |  17<L>  |  1.96<H>    Ca    8.9      2024 08:19    TPro  5.4<L>  /  Alb  3.4  /  TBili  2.3<H>  /  DBili  x   /  AST  91<H>  /  ALT  80<H>  /  AlkPhos  95  02-13    LIVER FUNCTIONS - ( 2024 08:19 )  Alb: 3.4 g/dL / Pro: 5.4 g/dL / ALK PHOS: 95 U/L / ALT: 80 U/L / AST: 91 U/L / GGT: x             Urinalysis Basic - ( 2024 08:19 )    Color: x / Appearance: x / SG: x / pH: x  Gluc: 180 mg/dL / Ketone: x  / Bili: x / Urobili: x   Blood: x / Protein: x / Nitrite: x   Leuk Esterase: x / RBC: x / WBC x   Sq Epi: x / Non Sq Epi: x / Bacteria: x                              10.4   8.90  )-----------( 181      ( 2024 07:09 )             30.6                         10.9   10.03 )-----------( 159      ( 2024 08:19 )             31.6                         8.9    10.73 )-----------( 174      ( 2024 07:17 )             26.6       Imaging personally reviewed by me:

## 2024-02-14 NOTE — PROGRESS NOTE ADULT - SUBJECTIVE AND OBJECTIVE BOX
Had a brief episode of chest pain yesterday S/P SL NTG  Now pain free   S/P VCE yesterday  /77  HR 70  Lungs clear  RR 1/6 systolic murmur  No edema    WBC 8.90  Hgb 10.4  Hct 30.6  Plt 181K    Imp:  Severe non operable CAD S/P NSTEMI with GI bleeding of unknown source  Rec:  Increase Metoprolol to 25 mg tid   Continue present Isordil dose   May need to increase to 30 mg tid  For colonoscopy today

## 2024-02-15 ENCOUNTER — TRANSCRIPTION ENCOUNTER (OUTPATIENT)
Age: 69
End: 2024-02-15

## 2024-02-15 VITALS
RESPIRATION RATE: 18 BRPM | TEMPERATURE: 98 F | SYSTOLIC BLOOD PRESSURE: 116 MMHG | OXYGEN SATURATION: 99 % | DIASTOLIC BLOOD PRESSURE: 74 MMHG | HEART RATE: 62 BPM

## 2024-02-15 LAB
ANION GAP SERPL CALC-SCNC: 9 MMOL/L — SIGNIFICANT CHANGE UP (ref 5–17)
BUN SERPL-MCNC: 27 MG/DL — HIGH (ref 7–23)
CALCIUM SERPL-MCNC: 9.1 MG/DL — SIGNIFICANT CHANGE UP (ref 8.4–10.5)
CHLORIDE SERPL-SCNC: 110 MMOL/L — HIGH (ref 96–108)
CO2 SERPL-SCNC: 20 MMOL/L — LOW (ref 22–31)
CREAT SERPL-MCNC: 1.86 MG/DL — HIGH (ref 0.5–1.3)
EGFR: 39 ML/MIN/1.73M2 — LOW
GLUCOSE BLDC GLUCOMTR-MCNC: 144 MG/DL — HIGH (ref 70–99)
GLUCOSE BLDC GLUCOMTR-MCNC: 241 MG/DL — HIGH (ref 70–99)
GLUCOSE BLDC GLUCOMTR-MCNC: 241 MG/DL — HIGH (ref 70–99)
GLUCOSE SERPL-MCNC: 121 MG/DL — HIGH (ref 70–99)
HCT VFR BLD CALC: 30 % — LOW (ref 39–50)
HGB BLD-MCNC: 9.7 G/DL — LOW (ref 13–17)
MCHC RBC-ENTMCNC: 29.9 PG — SIGNIFICANT CHANGE UP (ref 27–34)
MCHC RBC-ENTMCNC: 32.3 GM/DL — SIGNIFICANT CHANGE UP (ref 32–36)
MCV RBC AUTO: 92.6 FL — SIGNIFICANT CHANGE UP (ref 80–100)
NRBC # BLD: 0 /100 WBCS — SIGNIFICANT CHANGE UP (ref 0–0)
PLATELET # BLD AUTO: 175 K/UL — SIGNIFICANT CHANGE UP (ref 150–400)
POTASSIUM SERPL-MCNC: 3.9 MMOL/L — SIGNIFICANT CHANGE UP (ref 3.5–5.3)
POTASSIUM SERPL-SCNC: 3.9 MMOL/L — SIGNIFICANT CHANGE UP (ref 3.5–5.3)
RBC # BLD: 3.24 M/UL — LOW (ref 4.2–5.8)
RBC # FLD: 14.9 % — HIGH (ref 10.3–14.5)
SODIUM SERPL-SCNC: 139 MMOL/L — SIGNIFICANT CHANGE UP (ref 135–145)
WBC # BLD: 8.33 K/UL — SIGNIFICANT CHANGE UP (ref 3.8–10.5)
WBC # FLD AUTO: 8.33 K/UL — SIGNIFICANT CHANGE UP (ref 3.8–10.5)

## 2024-02-15 PROCEDURE — 93005 ELECTROCARDIOGRAM TRACING: CPT

## 2024-02-15 PROCEDURE — 84439 ASSAY OF FREE THYROXINE: CPT

## 2024-02-15 PROCEDURE — 85610 PROTHROMBIN TIME: CPT

## 2024-02-15 PROCEDURE — 84295 ASSAY OF SERUM SODIUM: CPT

## 2024-02-15 PROCEDURE — 99285 EMERGENCY DEPT VISIT HI MDM: CPT

## 2024-02-15 PROCEDURE — 82550 ASSAY OF CK (CPK): CPT

## 2024-02-15 PROCEDURE — 82962 GLUCOSE BLOOD TEST: CPT

## 2024-02-15 PROCEDURE — 88305 TISSUE EXAM BY PATHOLOGIST: CPT

## 2024-02-15 PROCEDURE — 82947 ASSAY GLUCOSE BLOOD QUANT: CPT

## 2024-02-15 PROCEDURE — P9016: CPT

## 2024-02-15 PROCEDURE — 85014 HEMATOCRIT: CPT

## 2024-02-15 PROCEDURE — 85027 COMPLETE CBC AUTOMATED: CPT

## 2024-02-15 PROCEDURE — 82553 CREATINE MB FRACTION: CPT

## 2024-02-15 PROCEDURE — C1889: CPT

## 2024-02-15 PROCEDURE — 86850 RBC ANTIBODY SCREEN: CPT

## 2024-02-15 PROCEDURE — 82803 BLOOD GASES ANY COMBINATION: CPT

## 2024-02-15 PROCEDURE — 83605 ASSAY OF LACTIC ACID: CPT

## 2024-02-15 PROCEDURE — 82435 ASSAY OF BLOOD CHLORIDE: CPT

## 2024-02-15 PROCEDURE — 83880 ASSAY OF NATRIURETIC PEPTIDE: CPT

## 2024-02-15 PROCEDURE — 71046 X-RAY EXAM CHEST 2 VIEWS: CPT

## 2024-02-15 PROCEDURE — 84443 ASSAY THYROID STIM HORMONE: CPT

## 2024-02-15 PROCEDURE — 36415 COLL VENOUS BLD VENIPUNCTURE: CPT

## 2024-02-15 PROCEDURE — 80053 COMPREHEN METABOLIC PANEL: CPT

## 2024-02-15 PROCEDURE — 85018 HEMOGLOBIN: CPT

## 2024-02-15 PROCEDURE — 86923 COMPATIBILITY TEST ELECTRIC: CPT

## 2024-02-15 PROCEDURE — 84484 ASSAY OF TROPONIN QUANT: CPT

## 2024-02-15 PROCEDURE — 83735 ASSAY OF MAGNESIUM: CPT

## 2024-02-15 PROCEDURE — 85730 THROMBOPLASTIN TIME PARTIAL: CPT

## 2024-02-15 PROCEDURE — 36430 TRANSFUSION BLD/BLD COMPNT: CPT

## 2024-02-15 PROCEDURE — 86900 BLOOD TYPING SEROLOGIC ABO: CPT

## 2024-02-15 PROCEDURE — 94640 AIRWAY INHALATION TREATMENT: CPT

## 2024-02-15 PROCEDURE — 84132 ASSAY OF SERUM POTASSIUM: CPT

## 2024-02-15 PROCEDURE — 80048 BASIC METABOLIC PNL TOTAL CA: CPT

## 2024-02-15 PROCEDURE — 82330 ASSAY OF CALCIUM: CPT

## 2024-02-15 PROCEDURE — 86901 BLOOD TYPING SEROLOGIC RH(D): CPT

## 2024-02-15 PROCEDURE — 85025 COMPLETE CBC W/AUTO DIFF WBC: CPT

## 2024-02-15 RX ORDER — ISOSORBIDE DINITRATE 5 MG/1
1 TABLET ORAL
Qty: 90 | Refills: 0
Start: 2024-02-15 | End: 2024-03-15

## 2024-02-15 RX ORDER — SACUBITRIL AND VALSARTAN 24; 26 MG/1; MG/1
1 TABLET, FILM COATED ORAL
Qty: 60 | Refills: 0
Start: 2024-02-15 | End: 2024-03-15

## 2024-02-15 RX ORDER — SACUBITRIL AND VALSARTAN 24; 26 MG/1; MG/1
1 TABLET, FILM COATED ORAL
Refills: 0 | DISCHARGE

## 2024-02-15 RX ORDER — METOPROLOL TARTRATE 50 MG
25 TABLET ORAL THREE TIMES A DAY
Refills: 0 | Status: DISCONTINUED | OUTPATIENT
Start: 2024-02-15 | End: 2024-02-15

## 2024-02-15 RX ORDER — APIXABAN 2.5 MG/1
1 TABLET, FILM COATED ORAL
Qty: 60 | Refills: 0
Start: 2024-02-15 | End: 2024-03-15

## 2024-02-15 RX ORDER — APIXABAN 2.5 MG/1
2.5 TABLET, FILM COATED ORAL EVERY 12 HOURS
Refills: 0 | Status: DISCONTINUED | OUTPATIENT
Start: 2024-02-15 | End: 2024-02-15

## 2024-02-15 RX ORDER — INSULIN LISPRO 100/ML
VIAL (ML) SUBCUTANEOUS
Refills: 0 | Status: DISCONTINUED | OUTPATIENT
Start: 2024-02-15 | End: 2024-02-15

## 2024-02-15 RX ADMIN — CLOPIDOGREL BISULFATE 75 MILLIGRAM(S): 75 TABLET, FILM COATED ORAL at 13:34

## 2024-02-15 RX ADMIN — Medication 25 MILLIGRAM(S): at 06:45

## 2024-02-15 RX ADMIN — SACUBITRIL AND VALSARTAN 1 TABLET(S): 24; 26 TABLET, FILM COATED ORAL at 06:45

## 2024-02-15 RX ADMIN — ISOSORBIDE DINITRATE 20 MILLIGRAM(S): 5 TABLET ORAL at 13:33

## 2024-02-15 RX ADMIN — ISOSORBIDE DINITRATE 20 MILLIGRAM(S): 5 TABLET ORAL at 06:45

## 2024-02-15 RX ADMIN — PANTOPRAZOLE SODIUM 40 MILLIGRAM(S): 20 TABLET, DELAYED RELEASE ORAL at 06:45

## 2024-02-15 NOTE — DISCHARGE NOTE PROVIDER - CARE PROVIDERS DIRECT ADDRESSES
[email or web address]

## 2024-02-15 NOTE — DISCHARGE NOTE PROVIDER - PROVIDER TOKENS
PROVIDER:[TOKEN:[[id number]],FOLLOWUP:[1 week]],PROVIDER:[TOKEN:[[id number]],FOLLOWUP:[2 weeks]],PROVIDER:[TOKEN:[[id number]],FOLLOWUP:[1 week]]

## 2024-02-15 NOTE — PROGRESS NOTE ADULT - SUBJECTIVE AND OBJECTIVE BOX
Chief complaint  Patient is a 69y old  Male who presents with a chief complaint of CP (15 Feb 2024 10:36)         Labs and Fingersticks  CAPILLARY BLOOD GLUCOSE      POCT Blood Glucose.: 241 mg/dL (15 Feb 2024 12:50)  POCT Blood Glucose.: 241 mg/dL (15 Feb 2024 12:50)  POCT Blood Glucose.: 144 mg/dL (15 Feb 2024 08:44)  POCT Blood Glucose.: 186 mg/dL (14 Feb 2024 21:25)  POCT Blood Glucose.: 78 mg/dL (14 Feb 2024 17:24)      Anion Gap: 9 (02-15 @ 07:06)      Calcium: 9.1 (02-15 @ 07:06)          02-15    139  |  110<H>  |  27<H>  ----------------------------<  121<H>  3.9   |  20<L>  |  1.86<H>    Ca    9.1      15 Feb 2024 07:06                          9.7    8.33  )-----------( 175      ( 15 Feb 2024 07:06 )             30.0     Medications  MEDICATIONS  (STANDING):  acetaminophen   IVPB .. 1000 milliGRAM(s) IV Intermittent once  apixaban 2.5 milliGRAM(s) Oral every 12 hours  atorvastatin 80 milliGRAM(s) Oral at bedtime  clopidogrel Tablet 75 milliGRAM(s) Oral daily  dextrose 5%. 1000 milliLiter(s) (100 mL/Hr) IV Continuous <Continuous>  dextrose 5%. 1000 milliLiter(s) (50 mL/Hr) IV Continuous <Continuous>  dextrose 50% Injectable 12.5 Gram(s) IV Push once  dextrose 50% Injectable 25 Gram(s) IV Push once  dextrose 50% Injectable 25 Gram(s) IV Push once  glucagon  Injectable 1 milliGRAM(s) IntraMuscular once  influenza  Vaccine (HIGH DOSE) 0.7 milliLiter(s) IntraMuscular once  insulin glargine Injectable (LANTUS) 10 Unit(s) SubCutaneous at bedtime  insulin lispro (ADMELOG) corrective regimen sliding scale   SubCutaneous three times a day before meals  isosorbide   dinitrate Tablet (ISORDIL) 20 milliGRAM(s) Oral three times a day  metoprolol tartrate 25 milliGRAM(s) Oral three times a day  pantoprazole    Tablet 40 milliGRAM(s) Oral two times a day  sacubitril 24 mG/valsartan 26 mG 1 Tablet(s) Oral two times a day      Physical Exam  General: Patient comfortable in bed   Vital Signs Last 12 Hrs  T(F): 98.2 (02-15-24 @ 11:51), Max: 98.7 (02-15-24 @ 04:00)  HR: 62 (02-15-24 @ 11:51) (62 - 83)  BP: 116/74 (02-15-24 @ 11:51) (114/69 - 116/74)  BP(mean): --  RR: 18 (02-15-24 @ 11:51) (18 - 18)  SpO2: 99% (02-15-24 @ 11:51) (94% - 99%)    CVS: S1S2   Respiratory: No wheezing, no crepitations  GI: Abdomen soft, bowel sounds positive  Musculoskeletal:  moves all extremities  : Voiding        Chief complaint  Patient is a 69y old  Male who presents with a chief complaint of CP (15 Feb 2024 10:36)         Labs and Fingersticks  CAPILLARY BLOOD GLUCOSE      POCT Blood Glucose.: 241 mg/dL (15 Feb 2024 12:50)  POCT Blood Glucose.: 241 mg/dL (15 Feb 2024 12:50)  POCT Blood Glucose.: 144 mg/dL (15 Feb 2024 08:44)  POCT Blood Glucose.: 186 mg/dL (14 Feb 2024 21:25)  POCT Blood Glucose.: 78 mg/dL (14 Feb 2024 17:24)      Anion Gap: 9 (02-15 @ 07:06)      Calcium: 9.1 (02-15 @ 07:06)          02-15    139  |  110<H>  |  27<H>  ----------------------------<  121<H>  3.9   |  20<L>  |  1.86<H>    Ca    9.1      15 Feb 2024 07:06                          9.7    8.33  )-----------( 175      ( 15 Feb 2024 07:06 )             30.0     Medications  MEDICATIONS  (STANDING):  acetaminophen   IVPB .. 1000 milliGRAM(s) IV Intermittent once  apixaban 2.5 milliGRAM(s) Oral every 12 hours  atorvastatin 80 milliGRAM(s) Oral at bedtime  clopidogrel Tablet 75 milliGRAM(s) Oral daily  dextrose 5%. 1000 milliLiter(s) (100 mL/Hr) IV Continuous <Continuous>  dextrose 5%. 1000 milliLiter(s) (50 mL/Hr) IV Continuous <Continuous>  dextrose 50% Injectable 12.5 Gram(s) IV Push once  dextrose 50% Injectable 25 Gram(s) IV Push once  dextrose 50% Injectable 25 Gram(s) IV Push once  glucagon  Injectable 1 milliGRAM(s) IntraMuscular once  influenza  Vaccine (HIGH DOSE) 0.7 milliLiter(s) IntraMuscular once  insulin glargine Injectable (LANTUS) 10 Unit(s) SubCutaneous at bedtime  insulin lispro (ADMELOG) corrective regimen sliding scale   SubCutaneous three times a day before meals  isosorbide   dinitrate Tablet (ISORDIL) 20 milliGRAM(s) Oral three times a day  metoprolol tartrate 25 milliGRAM(s) Oral three times a day  pantoprazole    Tablet 40 milliGRAM(s) Oral two times a day  sacubitril 24 mG/valsartan 26 mG 1 Tablet(s) Oral two times a day      Physical Exam  General: Patient comfortable in bed   Vital Signs Last 12 Hrs  T(F): 98.2 (02-15-24 @ 11:51), Max: 98.7 (02-15-24 @ 04:00)  HR: 62 (02-15-24 @ 11:51) (62 - 83)  BP: 116/74 (02-15-24 @ 11:51) (114/69 - 116/74)  BP(mean): --  RR: 18 (02-15-24 @ 11:51) (18 - 18)  SpO2: 99% (02-15-24 @ 11:51) (94% - 99%)    CVS: S1S2   Respiratory: No wheezing, no crepitations  GI: Abdomen soft, bowel sounds positive  Musculoskeletal:  moves all extremities  : Voiding

## 2024-02-15 NOTE — PROGRESS NOTE ADULT - ASSESSMENT
69 year-old man with history of multiple medical issues including hypertension, diabetes mellitus, coronary artery disease, atrial fibrillation, and chronic kidney disease. He presented overnight to an outside hospital with acute-onset chest pain waking him up at 4a.m; he left AMA from the ER and came to the [facility] ER so that he could be treated by his own doctors. He is s/p multiple admissions within the past month at [facility] for chest pain/NSTEMI. He was admitted at [facility] from 1/5-1/9/24 after presenting with chest pain and ruling in for NSTEMI. He underwent cardiac cath 1/8/24 with Dr. Julito Dominguez; drug eluting stents were deployed at sites of his 80% proximal LAD lesion, 80% mid-LAD lesion, and 70% proximal left main lesion. he came to the ER on Sunday 1/28/24 after his cardiac enzymes from 1/26/24 returned elevated. He underwent cardiac cath 1/29/24; his stents were patent. He returns with CP   - D/W Dr Dominguez personally, advises No cath at present time  - Appreciate Dr Kelton Nunn's consult - feels open heart surgery is not an option for him and would not be helpful nor indicated at present time  - Entresto restarted   - Cont antianginal therapy - nitrates and BB  - melenic stool - transfused PRBC.   - given marked drop in H/H  - discussed with Dr Dominguez - held Plavix, maintained ASA  - consider Ranexa if renal function improves   - trend H/H  - titrate up Beta blocker as BP allows  - If OK with GI (per pt he was verbally told it was OK ) restart Plavix  - appreciate multiple consultants f/u.   - pt  updated , DC planning      69 year-old man with history of multiple medical issues including hypertension, diabetes mellitus, coronary artery disease, atrial fibrillation, and chronic kidney disease. He presented overnight to an outside hospital with acute-onset chest pain waking him up at 4a.m; he left AMA from the ER and came to the [facility] ER so that he could be treated by his own doctors. He is s/p multiple admissions within the past month at [facility] for chest pain/NSTEMI. He was admitted at [facility] from 1/5-1/9/24 after presenting with chest pain and ruling in for NSTEMI. He underwent cardiac cath 1/8/24 with Dr. Julito Dominguez; drug eluting stents were deployed at sites of his 80% proximal LAD lesion, 80% mid-LAD lesion, and 70% proximal left main lesion. he came to the ER on Sunday 1/28/24 after his cardiac enzymes from 1/26/24 returned elevated. He underwent cardiac cath 1/29/24; his stents were patent. He returns with CP   - D/W Dr Dominguez personally, advises No cath at present time  - Appreciate Dr Kelton Nunn's consult - feels open heart surgery is not an option for him and would not be helpful nor indicated at present time  - Entresto restarted   - Cont antianginal therapy - nitrates and BB   - melenic stool - transfused PRBC.   - given marked drop in H/H  - discussed with Dr Dominguez - held Plavix, maintained ASA  - consider Ranexa if renal function improves   - trend H/H  - titrate up Beta blocker as BP allows and titrate up nitrates as well   - If OK with GI (per pt he was verbally told it was OK ) restart Plavix Restart AC - could change to Eliquis as d/w NP  - appreciate multiple consultants f/u.   - pt  updated , DC planning

## 2024-02-15 NOTE — PROGRESS NOTE ADULT - NUTRITIONAL ASSESSMENT
This patient has been assessed with a concern for Malnutrition and has been determined to have a diagnosis/diagnoses of Moderate protein-calorie malnutrition.  
This patient has been assessed with a concern for Malnutrition and has been determined to have a diagnosis/diagnoses of Moderate protein-calorie malnutrition.    This patient is being managed with:   Diet Clear Liquid-  Entered: Feb 13 2024  2:40PM    Diet NPO after Midnight-     NPO Start Date: 13-Feb-2024   NPO Start Time: 23:59  Entered: Feb 13 2024  2:08PM  

## 2024-02-15 NOTE — PROGRESS NOTE ADULT - ASSESSMENT
70 y/o male with hx of CAD s/p multiple  stents (most recent to pLM (70%), pLAD, mLAD on 1/8/24 with Dr. Dominguez), HFrEF , A-fib, HTN, HLD, T2DM,  CKD3, former smoker  Assessment  DMT2: 69y Male with DM T2 with hyperglycemia, A1C 6.7% , was on oral meds and insulin at home (Farxiga and Semglee basal insulin), on low dose insulin, blood sugars  trending down.   CAD: on medications, stable, monitored. CTS eval, recent stent.   HTN: on antihypertensive medications, monitored, asymptomatic.  CKD: Monitor labs/BMP,       Discussed plan and management with Dr Flo Jamil NP - TEAMS  Myrna Rossi MD  Cell: [phone]  Office: [phone]        70 y/o male with hx of CAD s/p multiple  stents (most recent to pLM (70%), pLAD, mLAD on 1/8/24 with Dr. Dominguez), HFrEF , A-fib, HTN, HLD, T2DM,  CKD3, former smoker  Assessment  DMT2: 69y Male with DM T2 with hyperglycemia, A1C 6.7% , was on oral meds and insulin at home (Farxiga and Semglee basal insulin), on low dose insulin, blood sugars trending down.   CAD: on medications, stable, monitored. CTS eval, recent stent.   HTN: on antihypertensive medications, monitored, asymptomatic.  CKD: Monitor labs/BMP,       Discussed plan and management with Dr Flo Jamil NP - TEAMS  Myrna Rossi MD  Cell: [phone]  Office: [phone]

## 2024-02-15 NOTE — DISCHARGE NOTE NURSING/CASE MANAGEMENT/SOCIAL WORK - NSDCPEFALRISK_GEN_ALL_CORE
For information on Fall & Injury Prevention, visit: [email or web address] OR  [email or web address] OR  https://www.cdc.gov/steadi/patient.html

## 2024-02-15 NOTE — PROGRESS NOTE ADULT - SUBJECTIVE AND OBJECTIVE BOX
INTERVAL HPI/OVERNIGHT EVENTS:    feels well   tolerating diet   no c/o abd pain, no rectal bleeding     MEDICATIONS  (STANDING):  acetaminophen   IVPB .. 1000 milliGRAM(s) IV Intermittent once  aspirin enteric coated 81 milliGRAM(s) Oral daily  atorvastatin 80 milliGRAM(s) Oral at bedtime  clopidogrel Tablet 75 milliGRAM(s) Oral daily  dextrose 5%. 1000 milliLiter(s) (100 mL/Hr) IV Continuous <Continuous>  dextrose 5%. 1000 milliLiter(s) (50 mL/Hr) IV Continuous <Continuous>  dextrose 50% Injectable 25 Gram(s) IV Push once  dextrose 50% Injectable 12.5 Gram(s) IV Push once  dextrose 50% Injectable 25 Gram(s) IV Push once  glucagon  Injectable 1 milliGRAM(s) IntraMuscular once  influenza  Vaccine (HIGH DOSE) 0.7 milliLiter(s) IntraMuscular once  insulin glargine Injectable (LANTUS) 10 Unit(s) SubCutaneous at bedtime  insulin lispro (ADMELOG) corrective regimen sliding scale   SubCutaneous three times a day before meals  isosorbide   dinitrate Tablet (ISORDIL) 20 milliGRAM(s) Oral three times a day  metoprolol tartrate 25 milliGRAM(s) Oral three times a day  pantoprazole    Tablet 40 milliGRAM(s) Oral two times a day  sacubitril 24 mG/valsartan 26 mG 1 Tablet(s) Oral two times a day    MEDICATIONS  (PRN):  acetaminophen     Tablet .. 650 milliGRAM(s) Oral every 6 hours PRN Mild Pain (1 - 3), Moderate Pain (4 - 6)  dextrose Oral Gel 15 Gram(s) Oral once PRN Blood Glucose LESS THAN 70 milliGRAM(s)/deciliter      Allergies    No Known Allergies    Intolerances        Review of Systems:    General:  No wt loss, fevers, chills, night sweats, fatigue   Eyes:  Good vision, no reported pain  ENT:  No sore throat, pain, runny nose, dysphagia  CV:  No pain, palpitations, hypo/hypertension  Resp:  No dyspnea, cough, tachypnea, wheezing  GI:  No pain, No nausea, No vomiting, No diarrhea, No constipation, No weight loss, No fever, No pruritis, No rectal bleeding, No melena, No dysphagia  :  No pain, bleeding, incontinence, nocturia  Muscle:  No pain, weakness  Neuro:  No weakness, tingling, memory problems  Psych:  No fatigue, insomnia, mood problems, depression  Endocrine:  No polyuria, polydypsia, cold/heat intolerance  Heme:  No petechiae, ecchymosis, easy bruisability  Skin:  No rash, tattoos, scars, edema      Vital Signs Last 24 Hrs  T(C): 36.8 (15 Feb 2024 11:51), Max: 37.1 (14 Feb 2024 22:24)  T(F): 98.2 (15 Feb 2024 11:51), Max: 98.8 (14 Feb 2024 22:24)  HR: 62 (15 Feb 2024 11:51) (62 - 104)  BP: 116/74 (15 Feb 2024 11:51) (104/55 - 166/79)  BP(mean): 85 (14 Feb 2024 14:25) (85 - 85)  RR: 18 (15 Feb 2024 11:51) (15 - 20)  SpO2: 99% (15 Feb 2024 11:51) (94% - 100%)    Parameters below as of 15 Feb 2024 11:51  Patient On (Oxygen Delivery Method): room air        PHYSICAL EXAM:    Constitutional: NAD  HEENT: EOMI, throat clear  Neck: No LAD, supple  Respiratory: CTA and P  Cardiovascular: S1 and S2, RRR, no M  Gastrointestinal: BS+, soft, NT/ND, neg HSM,  Extremities: No peripheral edema, neg clubbing, cyanosis  Vascular: 2+ peripheral pulses  Neurological: A/O x 3, no focal deficits  Psychiatric: Normal mood, normal affect  Skin: No rashes      LABS:                        9.7    8.33  )-----------( 175      ( 15 Feb 2024 07:06 )             30.0     02-15    139  |  110<H>  |  27<H>  ----------------------------<  121<H>  3.9   |  20<L>  |  1.86<H>    Ca    9.1      15 Feb 2024 07:06        Urinalysis Basic - ( 15 Feb 2024 07:06 )    Color: x / Appearance: x / SG: x / pH: x  Gluc: 121 mg/dL / Ketone: x  / Bili: x / Urobili: x   Blood: x / Protein: x / Nitrite: x   Leuk Esterase: x / RBC: x / WBC x   Sq Epi: x / Non Sq Epi: x / Bacteria: x        RADIOLOGY & ADDITIONAL TESTS:    < from: Colonoscopy (02.14.24 @ 14:23) >    [facility]  ____________________________________________________________________________________________________  Patient Name: Jean Claude Velázquez                    MRN: [id number]  Account Number: [id number]                     YOB: 1955  Room: Endoscopy Room 1                           Gender: Male  Attending MD: Ela Paige ,                    Procedure Date No Time: 2/14/2024  ____________________________________________________________________________________________________     Procedure:           Colonoscopy  Indications:         Hematochezia  Providers:           Ela Paige  Medicines:           Monitored Anesthesia Care  Complications:       No immediate complications.  ____________________________________________________________________________________________________  Procedure:           Pre-Anesthesia Assessment:                       - Prior to the procedure, a History and Physical was performed, and patient                        medications, allergies and sensitivities were reviewed. The patient's                        tolerance of previous anesthesia was reviewed.                       - The risks and benefits of the procedure and the sedation options and risks           were discussed with the patient. All questions were answered and informed                        consent was obtained.                       - Abdominal Examination: bowel sounds present, abdomen soft and non-tender,                        nomasses or organomegaly noted.                       - ASA Grade Assessment: IV - A patient with severe systemic disease that is a                        constant threat to life.                       After I obtained informed consent, the scope was passed under direct vision.                        Throughout the procedure, the patient's blood pressure, pulse, and oxygen                        saturations were monitored continuously. The Colonoscope was introduced                        through the anus and advanced to the terminal ileum. The colonoscopy was                        performed without difficulty. The patient tolerated the procedure well. The                        quality of the bowel preparation was good.                                                                                  Findings:       The perianal and digital rectal examinations were normal.       A single five mm clean based ulcer was found at the ileocecal valve. Biopsies were taken with        a cold forceps for histology. For location marking and to prevent bleeding, one hemostatic        clip was successfully placed (MR conditional). A few small areas of superficial ulceration        were also seen adjacent to the ulcer.       Internal hemorrhoids were found during retroflexion. The hemorrhoids were medium-sized.       The exam was otherwise without abnormality on direct and retroflexion views.       The terminal ileum appeared normal.                                                                  Impression:          - A single (solitary) ulcer at the ileocecal valve. Suspect it is ischemic in                        nature. Biopsied. Clip (MR conditional) was placed. Suspected source of                        bleeding.                       - The examination was otherwise normal on direct and retroflexion views.                       - The examined portion of the ileum was normal.  Recommendation:      - [facility].                       - Resume regular diet today.                       - Await pathology results.                       - Maintain euvolemia                       - Consider mesenteric duplex                       - OK to resume AC/Antiplatelet therapy today                       - Repeat colonoscopy in 5 years for screening purposes.                                                                                                        Attending Participation:       I personally performed the entire procedure.                                                                                                          _______________  Ela Paige,   2/14/2024 3:32:45 PM  This report has been signed electronically.  Number of Addenda: 0    Note Initiated On: 2/14/2024 2:23 PM    < end of copied text >

## 2024-02-15 NOTE — PROGRESS NOTE ADULT - SUBJECTIVE AND OBJECTIVE BOX
Date of service: 02-15-24 @ 09:35      Patient is a 69y old  Male who presents with a chief complaint of CP (15 Feb 2024 10:36)                                                               INTERVAL HPI/OVERNIGHT EVENTS:    REVIEW OF SYSTEMS:     CONSTITUTIONAL: No weakness, fevers or chills  EYES/ENT: No visual changes , no ear ache   NECK: No pain or stiffness  RESPIRATORY: No cough, wheezing,  No shortness of breath  CARDIOVASCULAR: No chest pain or palpitations  GASTROINTESTINAL: No abdominal pain  . No nausea, vomiting, or hematemesis; No diarrhea or constipation. No melena or hematochezia.  GENITOURINARY: No dysuria, frequency or hematuria  NEUROLOGICAL: No numbness or weakness  SKIN: No itching, burning, rashes, or lesions                                                                                                                                                                                                                                                                                 Medications:  MEDICATIONS  (STANDING):    MEDICATIONS  (PRN):       Allergies    No Known Allergies    Intolerances      Vital Signs Last 24 Hrs  T(C): 36.8 (15 Feb 2024 11:51), Max: 36.8 (15 Feb 2024 11:51)  T(F): 98.2 (15 Feb 2024 11:51), Max: 98.2 (15 Feb 2024 11:51)  HR: 62 (15 Feb 2024 11:51) (62 - 62)  BP: 116/74 (15 Feb 2024 11:51) (116/74 - 116/74)  BP(mean): --  RR: 18 (15 Feb 2024 11:51) (18 - 18)  SpO2: 99% (15 Feb 2024 11:51) (99% - 99%)    Parameters below as of 15 Feb 2024 11:51  Patient On (Oxygen Delivery Method): room air      CAPILLARY BLOOD GLUCOSE      POCT Blood Glucose.: 241 mg/dL (15 Feb 2024 12:50)  POCT Blood Glucose.: 241 mg/dL (15 Feb 2024 12:50)      02-15 @ 07:01  -  02-16 @ 07:00  --------------------------------------------------------  IN: 360 mL / OUT: 0 mL / NET: 360 mL      Physical Exam:    Daily     Daily   General:  Well appearing, NAD, not cachetic  HEENT:  Nonicteric, PERRLA  CV:  RRR, S1S2   Lungs:  CTA B/L, no wheezes, rales, rhonchi  Abdomen:  Soft, non-tender, no distended, positive BS  Extremities:  2+ pulses, no c/c, no edema  Skin:  Warm and dry, no rashes  :  No moreno  Neuro:  AAOx3, non-focal, grossly intact                                                                                                                                                                                                                                                                                                LABS:                               9.7    8.33  )-----------( 175      ( 15 Feb 2024 07:06 )             30.0                      02-15    139  |  110<H>  |  27<H>  ----------------------------<  121<H>  3.9   |  20<L>  |  1.86<H>    Ca    9.1      15 Feb 2024 07:06                         RADIOLOGY & ADDITIONAL TESTS         I personally reviewed: [  ]EKG   [  ]CXR    [  ] CT      A/P:         Discussed with :     Derek consultants' Notes   Time spent :

## 2024-02-15 NOTE — DISCHARGE NOTE PROVIDER - NSDCFUADDAPPT_GEN_ALL_CORE_FT
APPTS ARE READY TO BE MADE: [x] YES    Best Family or Patient Contact (if needed):    Additional Information about above appointments (if needed):    1:   2:   3:     Other comments or requests:    APPTS ARE READY TO BE MADE: [x] YES    Best Family or Patient Contact (if needed):    Additional Information about above appointments (if needed):    1:   2:   3:   Appointment was scheduled but is not visible on [facility].on 03/08 at 10:45am with  at [address] [facility], [address] [place] [address] will call back with sooner appointment    Appointment was scheduled but is not visible on [facility]. on 02/29 at 6pm with  at [address] [place] [address]    Appointment was scheduled but is not visible on [facility] on 02/22 at 5:30pm with  at [address] [place] [address]  Other comments or requests:

## 2024-02-15 NOTE — DISCHARGE NOTE PROVIDER - NSDCMRMEDTOKEN_GEN_ALL_CORE_FT
acetaminophen 325 mg oral tablet: 2 tab(s) orally every 6 hours As needed Temp greater or equal to 38C (100.4F), Moderate Pain (4 - 6)  apixaban 2.5 mg oral tablet: 1 tab(s) orally every 12 hours  atorvastatin 80 mg oral tablet: 1 tab(s) orally once a day (at bedtime)  clopidogrel 75 mg oral tablet: 1 tab(s) orally once a day  ezetimibe 10 mg oral tablet: 1 tab(s) orally once a day  Farxiga 10 mg oral tablet: 1 tab(s) orally once a day  furosemide 40 mg oral tablet: 1 tab(s) orally once a day  isosorbide dinitrate 20 mg oral tablet: 1 tab(s) orally 3 times a day  metoprolol tartrate 25 mg oral tablet: 1 tab(s) orally 2 times a day MDD: 2 tab  multivitamin gummies: 1 gummy orally once a day  sacubitril-valsartan 24 mg-26 mg oral tablet: 1 tab(s) orally 2 times a day  Semglee (Prefilled Pen) 100 units/mL subcutaneous solution: 10 unit(s) subcutaneous once a day (at bedtime)   acetaminophen 325 mg oral tablet: 2 tab(s) orally every 6 hours As needed Temp greater or equal to 38C (100.4F), Moderate Pain (4 - 6)  apixaban 2.5 mg oral tablet: 1 tab(s) orally every 12 hours  atorvastatin 80 mg oral tablet: 1 tab(s) orally once a day (at bedtime)  clopidogrel 75 mg oral tablet: 1 tab(s) orally once a day  ezetimibe 10 mg oral tablet: 1 tab(s) orally once a day  Farxiga 10 mg oral tablet: 1 tab(s) orally once a day  furosemide 40 mg oral tablet: 1 tab(s) orally once a day  isosorbide dinitrate 20 mg oral tablet: 1 tab(s) orally 3 times a day  metoprolol tartrate 25 mg oral tablet: 1 tab(s) orally 2 times a day MDD: 2 tab  multivitamin gummies: 1 gummy orally once a day  sacubitril-valsartan 24 mg-26 mg oral tablet: 1 tab(s) orally 2 times a day  Semglee (Prefilled Pen) 100 units/mL subcutaneous solution: 10 unit(s) subcutaneous once a day (at bedtime)  Sick NOte: Mr Mead was admitted at [facility] from 2/9 to 2/15/2024. Please call if any questions.

## 2024-02-15 NOTE — PROGRESS NOTE ADULT - NS ATTEND AMEND GEN_ALL_CORE FT
Chart, labs, vitals, radiology reviewed. Above H&P reviewed and edited where appropriate. Agree with history and physical exam. Agree with assessment and plan. I reviewed the overnight course of events and discussed the care with the patient/ family.  All the decisions in assessment and plan are made by me
Chart, labs, vitals, radiology reviewed. Above H&P reviewed and edited where appropriate. Agree with history and physical exam. Agree with assessment and plan. I reviewed the overnight course of events and discussed the care with the patient/ family.  All the decisions in assessment and plan are made by me
Chart, labs, vitals, radiology reviewed. Above H&P reviewed and edited where appropriate. Agree with history and physical exam. Agree with assessment and plan. I reviewed the overnight course of events and discussed the care with the patient/ family.  All the decisions in assessment and plan are made by me.
Chart, labs, vitals, radiology reviewed. Above H&P reviewed and edited where appropriate. Agree with history and physical exam. Agree with assessment and plan. I reviewed the overnight course of events and discussed the care with the patient/ family.  All the decisions in assessment and plan are made by me..

## 2024-02-15 NOTE — PROGRESS NOTE ADULT - ASSESSMENT
68 y/o male with hx of CAD s/p multiple  stents (most recent to pLM (70%), pLAD, mLAD on 1/8/24 with Dr. Dominguez), HFrEF (EF~50% as of 11/26/23), A-fib, HTN, HLD, T2DM, CKD3, former smoker admitted with recurrent cp similar to his pain from cad ..  denies fever chiills  cough   n/v/d       Angina / NSTEMI : d/w Dr. Rivas and Dr. Deleon   will restart  plavix and AC   dc asa   consult interventional and CTS :  cont medical management     acute anemia sec to acute GIB:  ppi   s/p transfusion   fu w GI : EGD : no acute pathology  Capsule:  NL     colonoscopy : A single (solitary) ulcer at the ileocecal valve. Suspect it is ischemic in                        nature. Biopsied. Clip (MR conditional) was placed. Suspected source of                        bleeding.                       - The examination was otherwise normal on direct and retroflexion views.                       - The examined portion of the ileum was normal.    GI bleed likley in setting of AC /Antiplt       elevated LFT : monitor on lipitor     BARRY on ckd : hold lasix  faxiga     DM monitor fs       discussed with pt at length   d/w acp   pt stable for dc to fu

## 2024-02-15 NOTE — DISCHARGE NOTE PROVIDER - CARE PROVIDER_API CALL
Alfred Moon  Cardiology  [address] [place] [address]  Phone: [phone]  Fax: [phone]  Follow Up Time: 1 week    Donte Parikh  Gastroenterology  [address] [place] [address]  Phone: [phone]  Fax: [phone]  Follow Up Time: 2 weeks    Noa Carl  [address]  [place] [address]  Phone: [phone]  Fax: [phone]  Follow Up Time: 1 week

## 2024-02-15 NOTE — PROGRESS NOTE ADULT - PROBLEM SELECTOR PLAN 3
On medications,  no chest pain, stable, monitored and followed up by primary cardiothoracic team/cardiology team

## 2024-02-15 NOTE — PROGRESS NOTE ADULT - ASSESSMENT
1. CAD s/p PCI, NSTEMI  no objection to resuming AC/Antiplatelet therapy   PPI ppx daily     2. GI Bleed  EGD 2/12/24 normal   Capsule endoscopy reviewed - normal small bowel  Colonoscopy w/ulcer at ileocecal valve, s/p clip   no objection to resuming AC/Antiplatelet therapy  d/c planning underway, no gi objection     3. CHF    4. AFib      I had a prolonged conversation with the patient regarding the hospital course, differential diagnosis, results of diagnostic tests this far, and therapeutic modalities available. Plan of care discussed with the patient after the evaluation. Patient expresses a clear understanding of the plan of care.    Donte Paige D.O.  Gastroenterology and Hepatology  [address] [place] [address], [facility] [address]  [place], [place]  Office: [phone]

## 2024-02-15 NOTE — CHART NOTE - NSCHARTNOTESELECT_GEN_ALL_CORE
Event Note
Discharge/Event Note
Event Note
GIB/Blood Bank
Wound Care Team Note:/Event Note
diet/Event Note

## 2024-02-15 NOTE — DISCHARGE NOTE PROVIDER - HOSPITAL COURSE
see above HPI:  68 y/o male with hx of CAD s/p multiple  stents (most recent to pLM (70%), pLAD, mLAD on 1/8/24 with Dr. Dominguez), HFrEF (EF~50% as of 11/26/23), A-fib, HTN, HLD, T2DM, CKD3, former smoker admitted with recurrent cp similar to his pain from cad ..  denies fever chiills  cough   n/v/d    (09 Feb 2024 11:23)    Hospital Course:  ; He was admitted at [facility] from 1/5-1/9/24 after presenting with chest pain and ruling in for NSTEMI. He underwent cardiac cath 1/8/24 with Dr. Julito Dominguez; drug eluting stents were deployed at sites of his 80% proximal LAD lesion, 80% mid-LAD lesion, and 70% proximal left main lesion. he came to the ER on Sunday 1/28/24 after his cardiac enzymes from 1/26/24 returned elevated. He underwent cardiac cath 1/29/24; his stents were patent. HE returns with CP   cardiology  D/W Dr Dominguez personally, advises No cath at present time.  Appreciate Dr Kelton Nunn's consult - feels open heart surgery is not an option for him and would not be helpful nor indicated at present time.  Entresto Farxiga  on hold. cont  antianginal therapy - nitrates and BB  Course c/b melenic stool - transfused PRBC. plavix held.  EGD : no acute pathology  capsule: no SB bleed  colonoscopy > Ileo cecal ulcer , ? ischemic. Biopsied. OK to restart AC/antiplatelets per GI> restarted plavix and Eliquis     -Medically cleared for discharge Home with cardiology, renal, PCP, GI  follow up  Important Medication Changes and Reason:  xarelto stopped  started eliquis  stopped asa to avoid triple therapy  entresto reduced due to ckd  Active or Pending Issues Requiring Follow-up:  biopsy results . f/u GI    Advanced Directives:   [x ] Full code  [ ] DNR  [ ] Hospice    Discharge Diagnoses:    #ASHD s/p multiple pci,   # HFrEF (EF~50%, 11/26/23)  #A-fib  #HTN  #HL  #T2DM  #jesika on CKD

## 2024-02-15 NOTE — PROGRESS NOTE ADULT - PROVIDER SPECIALTY LIST ADULT
Cardiology
Gastroenterology
Nephrology
Cardiology
Cardiology
Gastroenterology
Gastroenterology
Internal Medicine
Nephrology
Internal Medicine
Internal Medicine
Cardiology
Endocrinology

## 2024-02-15 NOTE — DISCHARGE NOTE PROVIDER - NSDCCPCAREPLAN_GEN_ALL_CORE_FT
PRINCIPAL DISCHARGE DIAGNOSIS  Diagnosis: NSTEMI (non-ST elevation myocardial infarction)  Assessment and Plan of Treatment: continue plavix   continue current cardiac medications > metoprolol, imdur   Follow up with cardiology in 1 week      SECONDARY DISCHARGE DIAGNOSES  Diagnosis: Melena  Assessment and Plan of Treatment: you had colonoscopy showing ulcer in large ointestine that was biopsied  Follow up with Gi in 2 weeks    Diagnosis: Acute on chronic kidney failure  Assessment and Plan of Treatment: Avoid taking (NSAIDs) - (ex: Ibuprofen, Advil, Celebrex, Naprosyn)  Avoid taking any nephrotoxic agents (can harm kidneys) - Intravenous contrast for diagnostic testing, combination cold medications.  Have all medications adjusted for your renal function by your Health Care Provider.  Blood pressure control is important.  Take all medication as prescribed.      Diagnosis: Chronic atrial fibrillation  Assessment and Plan of Treatment: continue eliquis  follow up with cardiology

## 2024-02-15 NOTE — CHART NOTE - NSCHARTNOTEFT_GEN_A_CORE
69-year-old male history of CAD, CHF, A-fib, CAD w/ 9 stented, T2DM admitted with chest pain on a Heparin drip noted with large dark stool x 1. Pt without chest pain, SOB, abd pain.   # GIB- d/c hep gtt, Protonix drip to start, transfuse 1u PBC , keep NPO, GI eval, CBC q6h  Discussed with Dr Mcdaniel
Brief GI note  --    s/p EGD: normal     OK for regular diet today  Plan for capsule endoscopy tomorrow   Moviprep 1 L ordered for 10 PM tonight and NPO  in past midnight
Discharge   Discussed with attending. pt being discharged on ELiquis and plavix and NO Aspirin. Medications reconciled with Attending.
Patient without further black/bloody stool today. Seen by GI, VSS. Will advance diet for now as no planned interventions today. Follow closely
Primary GI team at bedside; informed consent for Video Capsule Endoscopy obtained by primary team    NPO since MN  completed laxative/prep last night in preparation for VCE  informed consent checked and reviewed with pt for VCE    Pt swallowed VCE without difficulty with water at 10:05AM    Capsule type: SB3  Lot #[id number]    NPO x2 hours  PO clear liquids at 12 pm  PO solids/Regular diet at 2pm    RN may remove VCE recorder and belt at 10pm and place in labeled bag at pt's bedside  GI team will  VCE belt and recorder in AM 2/14     Discussed with pt, Medicine and primary GI team    Dorian Lanier PA-C  [facility] Teaching GI Service  Available on TEAMS Mon-Fri 8a-4p  After hours and weekend coverage [phone]
Wound Care Team Note:    Request for wound care consult for foot/toe wound received and should be referred to podiatry. Discussed with team. Will defer to podiatry for management. Please contact Podiatry for questions/concerns. Will not follow.    Nicole Jackson, LEANN-C, CWOCN via TEAMS
pt to eat regular diet for lunch at 2:00pm   resume Clear Diet right after lunch   will bowel prep with Moviprep 2L at 6pm   NPO p MN for Colonoscopy tomorrow
Called by the nurse to report that patient c/o chest while getting washed up. Patient was report after pain was resolved. patient seen at bedside, states he had non-radiating left sided chestpain that self resolves.       Vital Signs Last 24 Hrs  T(C): 37.1 (11 Feb 2024 12:16), Max: 37.1 (11 Feb 2024 12:16)  T(F): 98.7 (11 Feb 2024 12:16), Max: 98.7 (11 Feb 2024 12:16)  HR: 89 (11 Feb 2024 12:16) (88 - 90)  BP: 125/80 (11 Feb 2024 12:16) (116/65 - 129/65)  BP(mean): --  RR: 16 (11 Feb 2024 12:16) (16 - 18)  SpO2: 97% (11 Feb 2024 12:16) (97% - 99%)    Parameters below as of 11 Feb 2024 12:16  Patient On (Oxygen Delivery Method): room air          Labs:                          8.9    11.07 )-----------( 163      ( 11 Feb 2024 06:48 )             26.5     02-11    139  |  109<H>  |  54<H>  ----------------------------<  154<H>  4.3   |  20<L>  |  2.01<H>    Ca    9.0      11 Feb 2024 06:51    TPro  5.4<L>  /  Alb  3.5  /  TBili  2.2<H>  /  DBili  x   /  AST  138<H>  /  ALT  95<H>  /  AlkPhos  82  02-10      CARDIAC MARKERS ( 11 Feb 2024 13:19 )  x     / x     / 1318 U/L / x     / 21.0 ng/mL            Physical Exam:  General: WN/WD NAD  Neurology: A&Ox3, nonfocal, RAMOS x 4  Head:  Normocephalic, atraumatic  Respiratory: CTA B/L  CV: RRR, S1S2, no murmur  Abdominal: Soft, NT, ND no palpable mass  MSK: No edema, + peripheral pulses, FROM all 4 extremity    Assessment & Plan:  HPI:  68 y/o male with hx of CAD s/p multiple  stents (most recent to pLM (70%), pLAD, mLAD on 1/8/24 with Dr. Dominguez), HFrEF (EF~50% as of 11/26/23), A-fib, HTN, HLD, T2DM, CKD3, former smoker admitted with recurrent cp similar to his pain from cad.   DX Angina    > EKG  >Stat cardia enzymes - Troponin 528(550)  > Continue tele monitoring  > patient instructed to report pain as its occuring

## 2024-02-15 NOTE — PROGRESS NOTE ADULT - SUBJECTIVE AND OBJECTIVE BOX
SUBJECTIVE:  NO CP no SOB  	  MEDICATIONS:  isosorbide   dinitrate Tablet (ISORDIL) 20 milliGRAM(s) Oral three times a day  metoprolol tartrate 25 milliGRAM(s) Oral three times a day  sacubitril 24 mG/valsartan 26 mG 1 Tablet(s) Oral two times a day        acetaminophen     Tablet .. 650 milliGRAM(s) Oral every 6 hours PRN  acetaminophen   IVPB .. 1000 milliGRAM(s) IV Intermittent once    pantoprazole    Tablet 40 milliGRAM(s) Oral two times a day    atorvastatin 80 milliGRAM(s) Oral at bedtime  dextrose 50% Injectable 12.5 Gram(s) IV Push once  dextrose 50% Injectable 25 Gram(s) IV Push once  dextrose 50% Injectable 25 Gram(s) IV Push once  dextrose Oral Gel 15 Gram(s) Oral once PRN  glucagon  Injectable 1 milliGRAM(s) IntraMuscular once  insulin glargine Injectable (LANTUS) 10 Unit(s) SubCutaneous at bedtime  insulin lispro (ADMELOG) corrective regimen sliding scale   SubCutaneous three times a day before meals    aspirin enteric coated 81 milliGRAM(s) Oral daily  clopidogrel Tablet 75 milliGRAM(s) Oral daily  dextrose 5%. 1000 milliLiter(s) IV Continuous <Continuous>  dextrose 5%. 1000 milliLiter(s) IV Continuous <Continuous>  influenza  Vaccine (HIGH DOSE) 0.7 milliLiter(s) IntraMuscular once      REVIEW OF SYSTEMS:    CONSTITUTIONAL: No fever, weight loss, or fatigue  EYES: No eye pain, visual disturbances, or discharge  NECK: No pain or stiffness  RESPIRATORY: No cough, wheezing, chills or hemoptysis; No Shortness of Breath  CARDIOVASCULAR: No chest pain, palpitations, dizziness, or leg swelling  GASTROINTESTINAL: No abdominal or epigastric pain. No nausea, vomiting, or hematemesis; No diarrhea or constipation. No melena or hematochezia.  GENITOURINARY: No dysuria, frequency, hematuria, or incontinence  NEUROLOGICAL: No headaches, memory loss, loss of strength, numbness, or tremors  SKIN: No itching, burning, rashes, or lesions   LYMPH Nodes: No enlarged glands  MUSCULOSKELETAL: No joint pain or swelling; No muscle, back, or extremity pain  All other review of systems are negative.  	  [ ] Unable to obtain    PHYSICAL EXAM:  T(C): 37.1 (02-15-24 @ 04:00), Max: 37.1 (02-14-24 @ 22:24)  HR: 83 (02-15-24 @ 06:47) (65 - 104)  BP: 116/70 (02-15-24 @ 06:47) (104/55 - 166/79)  RR: 18 (02-15-24 @ 04:00) (15 - 20)  SpO2: 94% (02-15-24 @ 04:00) (94% - 100%)  Wt(kg): --  I&O's Summary    14 Feb 2024 07:01  -  15 Feb 2024 07:00  --------------------------------------------------------  IN: 790 mL / OUT: 0 mL / NET: 790 mL      Height (cm): 167.6 (02-14 @ 14:25)  Weight (kg): 59.4 (02-14 @ 14:25)  BMI (kg/m2): 21.1 (02-14 @ 14:25)  BSA (m2): 1.67 (02-14 @ 14:25)    PHYSICAL EXAM    Appearance: Normal	  HEENT:   Normal oral mucosa, PERRL, EOMI	  NECK: Soft and supple, No LAD, No JVD  Cardiovascular: Regular Rate and Rhythm, Normal S1 S2, No murmurs, No clicks, gallops or rubs  Respiratory: Lungs clear to auscultation	  Gastrointestinal:  Soft, Non-tender, + BS	  Skin: No rashes, No ecchymoses, No cyanosis  Neurologic: Non-focal  Extremities: No clubbing, cyanosis or edema  Vascular: Peripheral pulses palpable 2+ bilaterally    LABS:	 	                            9.7    8.33  )-----------( 175      ( 15 Feb 2024 07:06 )             30.0     02-15    139  |  110<H>  |  27<H>  ----------------------------<  121<H>  3.9   |  20<L>  |  1.86<H>    Ca    9.1      15 Feb 2024 07:06

## 2024-02-15 NOTE — PROGRESS NOTE ADULT - PROBLEM SELECTOR PLAN 1
Will continue current insulin regimen for now. Will continue monitoring  blood sugars, will Follow up.  Patient counseled for compliance with consistent low carb diet.  .
Will continue current insulin regimen for now.   Will continue monitoring  blood sugars, will Follow up.  Patient counseled for compliance with consistent low carb diet.  DC on home Semglee and Home Farxiga  FU outpatient
Will continue current insulin regimen for now.   Will continue monitoring  blood sugars, will Follow up.  Patient counseled for compliance with consistent low carb diet.  DC on home Semglee and Home Farxiga  FU outpatient
Will continue current insulin regimen for now.   Will continue monitoring  blood sugars, will Follow up.  Patient counseled for compliance with consistent low carb diet.  DC on home Semglee and Home Farxiga  FU outpatient 4 weeks.
Will continue current insulin regimen for now.   Will continue monitoring  blood sugars, will Follow up.  Patient counseled for compliance with consistent low carb diet.  DC on home Semglee and Home Farxiga  FU outpatient
Will continue current insulin regimen for now.   Will continue monitoring  blood sugars, will Follow up.  Patient counseled for compliance with consistent low carb diet.  DC on home Semglee and Home Farxiga  FU outpatient

## 2024-02-15 NOTE — DISCHARGE NOTE PROVIDER - NSDCHC_MEDRECSTATUS_GEN_ALL_CORE
Admission Reconciliation is Completed  Discharge Reconciliation is Completed <-- Click to add NO significant Past Surgical History

## 2024-02-15 NOTE — PROGRESS NOTE ADULT - SUBJECTIVE AND OBJECTIVE BOX
Overnight events noted      VITAL:  T(C): , Max: 37.1 (02-14-24 @ 22:24)  T(F): , Max: 98.8 (02-14-24 @ 22:24)  HR: 83 (02-15-24 @ 06:47)  BP: 116/70 (02-15-24 @ 06:47)  BP(mean): 85 (02-14-24 @ 14:25)  RR: 18 (02-15-24 @ 04:00)  SpO2: 94% (02-15-24 @ 04:00)  Wt(kg): --      PHYSICAL EXAM:  Constitutional: NAD, Alert  HEENT: NCAT, MMM  Neck: Supple, No JVD  Respiratory: CTA-b/l  Cardiovascular: irreg s1s2  Gastrointestinal: BS+, soft, NT/ND  Extremities: No peripheral edema b/l  Neurological: no focal deficits; strength grossly intact  Back: no CVAT b/l  Skin: No rashes, no nevi    LABS:                        9.7    8.33  )-----------( 175      ( 15 Feb 2024 07:06 )             30.0     Na(139)/K(3.9)/Cl(110)/HCO3(20)/BUN(27)/Cr(1.86)Glu(121)/Ca(9.1)/Mg(--)/PO4(--)    02-15 @ 07:06  Na(137)/K(4.2)/Cl(107)/HCO3(17)/BUN(40)/Cr(1.96)Glu(180)/Ca(8.9)/Mg(--)/PO4(--)    02-13 @ 08:19      IMPRESSION: 69M w/ HTN, DM2, CAD, AFib, and CKD, s/p multiple admissions of late for chest pain/NSTEMI; 2/9/24 p/w chest pain/NSTEMI    (1)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  (2)BARRY - prerenal on admission; now resolved  (3)Metabolic acidosis - controlled  (4)Chest pain - recurrent chest pain from recurrent small NSTEMIs. For medical management.   (5)GI - s/p colonoscopy 2/14/24; single ulcer in the ileocecal valve; clipped and biopsied.     RECOMMEND:  (1)Meds as ordered  (2)D/C planning per primary team; can f/u at my office Monday 4/29 at 640p as previously scheduled      Markell Walton MD  [facility]  Office/on call physician: [phone]  Cell (7a-7p): [phone]       No pain, no sob      VITAL:  T(C): , Max: 37.1 (02-14-24 @ 22:24)  T(F): , Max: 98.8 (02-14-24 @ 22:24)  HR: 83 (02-15-24 @ 06:47)  BP: 116/70 (02-15-24 @ 06:47)  BP(mean): 85 (02-14-24 @ 14:25)  RR: 18 (02-15-24 @ 04:00)  SpO2: 94% (02-15-24 @ 04:00)  Wt(kg): --      PHYSICAL EXAM:  Constitutional: NAD, Alert  HEENT: NCAT, MMM  Neck: Supple, No JVD  Respiratory: CTA-b/l  Cardiovascular: irreg s1s2  Gastrointestinal: BS+, soft, NT/ND  Extremities: No peripheral edema b/l  Neurological: no focal deficits; strength grossly intact  Back: no CVAT b/l  Skin: No rashes, no nevi    LABS:                        9.7    8.33  )-----------( 175      ( 15 Feb 2024 07:06 )             30.0     Na(139)/K(3.9)/Cl(110)/HCO3(20)/BUN(27)/Cr(1.86)Glu(121)/Ca(9.1)/Mg(--)/PO4(--)    02-15 @ 07:06  Na(137)/K(4.2)/Cl(107)/HCO3(17)/BUN(40)/Cr(1.96)Glu(180)/Ca(8.9)/Mg(--)/PO4(--)    02-13 @ 08:19      IMPRESSION: 69M w/ HTN, DM2, CAD, AFib, and CKD, s/p multiple admissions of late for chest pain/NSTEMI; 2/9/24 p/w chest pain/NSTEMI    (1)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  (2)BARRY - prerenal on admission; now resolved  (3)Metabolic acidosis - controlled  (4)Chest pain - recurrent chest pain from recurrent small NSTEMIs. For medical management.   (5)GI - s/p colonoscopy 2/14/24; single ulcer in the ileocecal valve; clipped and biopsied.     RECOMMEND:  (1)Meds as ordered  (2)D/C planning per primary team; can f/u at my office Monday 4/29 at 640p as previously scheduled      Markell Walton MD  [facility]  Office/on call physician: [phone]  Cell (7a-7p): [phone]

## 2024-02-15 NOTE — PROGRESS NOTE ADULT - PROBLEM SELECTOR PLAN 2
Will continue statin, primary team FU

## 2024-02-15 NOTE — DISCHARGE NOTE NURSING/CASE MANAGEMENT/SOCIAL WORK - PATIENT PORTAL LINK FT
You can access the FollowMyHealth Patient Portal offered by [facility] by registering at the following website: [email or web address]. By joining [employer]’s FollowMyHealth portal, you will also be able to view your health information using other applications (apps) compatible with our system.

## 2024-02-21 LAB — SURGICAL PATHOLOGY STUDY: SIGNIFICANT CHANGE UP

## 2024-04-04 NOTE — ED ADULT TRIAGE NOTE - PAIN RATING/NUMBER SCALE (0-10): ACTIVITY
Requested Prescriptions     Pending Prescriptions Disp Refills    PROPRANOLOL HCL  MG Oral Capsule SR 24 Hr [Pharmacy Med Name: propranolol  mg capsule,24 hr,extended release] 90 capsule 0     Sig: Take 1 capsule (160 mg total) by mouth daily. APPOINTMENT NEEDED FOR FURTHER REFILLS       Last OV: 12/13/22  Next OV: None  Last refilled: 11/28/23     Front end, please contact the patient to schedule an OV.  Thanks.  Reviewed and electronically signed by: ZI Gutierrez      0 (no pain/absence of nonverbal indicators of pain)

## 2024-04-15 ENCOUNTER — INPATIENT (INPATIENT)
Facility: HOSPITAL | Age: 69
LOS: 3 days | Discharge: HOME | DRG: 313 | End: 2024-04-19
Attending: GENERAL ACUTE CARE HOSPITAL | Admitting: GENERAL ACUTE CARE HOSPITAL
Payer: COMMERCIAL

## 2024-04-15 VITALS
WEIGHT: 139.99 LBS | DIASTOLIC BLOOD PRESSURE: 69 MMHG | TEMPERATURE: 98 F | OXYGEN SATURATION: 100 % | HEIGHT: 66 IN | SYSTOLIC BLOOD PRESSURE: 151 MMHG | HEART RATE: 102 BPM | RESPIRATION RATE: 20 BRPM

## 2024-04-15 PROCEDURE — 99285 EMERGENCY DEPT VISIT HI MDM: CPT

## 2024-04-15 NOTE — ED ADULT TRIAGE NOTE - CHIEF COMPLAINT QUOTE
c/o SOB, fatigue, cough, CLEARY x 2 weeks. Has been taking nitroglycerine for SOB Completed course of antibiotics last week. Denying chest pain.

## 2024-04-16 DIAGNOSIS — R07.9 CHEST PAIN, UNSPECIFIED: ICD-10-CM

## 2024-04-16 LAB
A1C WITH ESTIMATED AVERAGE GLUCOSE RESULT: 7.7 % — HIGH (ref 4–5.6)
ADD ON TEST-SPECIMEN IN LAB: SIGNIFICANT CHANGE UP
ALBUMIN SERPL ELPH-MCNC: 3.4 G/DL — SIGNIFICANT CHANGE UP (ref 3.3–5)
ALP SERPL-CCNC: 157 U/L — HIGH (ref 40–120)
ALT FLD-CCNC: 75 U/L — HIGH (ref 10–45)
ANION GAP SERPL CALC-SCNC: 11 MMOL/L — SIGNIFICANT CHANGE UP (ref 5–17)
ANION GAP SERPL CALC-SCNC: 13 MMOL/L — SIGNIFICANT CHANGE UP (ref 5–17)
AST SERPL-CCNC: 93 U/L — HIGH (ref 10–40)
B-OH-BUTYR SERPL-SCNC: 0.2 MMOL/L — SIGNIFICANT CHANGE UP
B-OH-BUTYR SERPL-SCNC: 0.2 MMOL/L — SIGNIFICANT CHANGE UP
BASE EXCESS BLDV CALC-SCNC: 0.6 MMOL/L — SIGNIFICANT CHANGE UP (ref -2–3)
BASOPHILS # BLD AUTO: 0.05 K/UL — SIGNIFICANT CHANGE UP (ref 0–0.2)
BASOPHILS NFR BLD AUTO: 0.4 % — SIGNIFICANT CHANGE UP (ref 0–2)
BILIRUB SERPL-MCNC: 1.1 MG/DL — SIGNIFICANT CHANGE UP (ref 0.2–1.2)
BUN SERPL-MCNC: 45 MG/DL — HIGH (ref 7–23)
BUN SERPL-MCNC: 47 MG/DL — HIGH (ref 7–23)
CA-I SERPL-SCNC: 1.28 MMOL/L — SIGNIFICANT CHANGE UP (ref 1.15–1.33)
CALCIUM SERPL-MCNC: 8.9 MG/DL — SIGNIFICANT CHANGE UP (ref 8.4–10.5)
CALCIUM SERPL-MCNC: 9.3 MG/DL — SIGNIFICANT CHANGE UP (ref 8.4–10.5)
CHLORIDE BLDV-SCNC: 100 MMOL/L — SIGNIFICANT CHANGE UP (ref 96–108)
CHLORIDE SERPL-SCNC: 100 MMOL/L — SIGNIFICANT CHANGE UP (ref 96–108)
CHLORIDE SERPL-SCNC: 97 MMOL/L — SIGNIFICANT CHANGE UP (ref 96–108)
CO2 BLDV-SCNC: 27 MMOL/L — HIGH (ref 22–26)
CO2 SERPL-SCNC: 21 MMOL/L — LOW (ref 22–31)
CO2 SERPL-SCNC: 21 MMOL/L — LOW (ref 22–31)
CREAT SERPL-MCNC: 1.77 MG/DL — HIGH (ref 0.5–1.3)
CREAT SERPL-MCNC: 2.06 MG/DL — HIGH (ref 0.5–1.3)
EGFR: 35 ML/MIN/1.73M2 — LOW
EGFR: 42 ML/MIN/1.73M2 — LOW
EOSINOPHIL # BLD AUTO: 1.03 K/UL — HIGH (ref 0–0.5)
EOSINOPHIL NFR BLD AUTO: 8.7 % — HIGH (ref 0–6)
ESTIMATED AVERAGE GLUCOSE: 174 MG/DL — HIGH (ref 68–114)
FLUAV AG NPH QL: SIGNIFICANT CHANGE UP
FLUBV AG NPH QL: SIGNIFICANT CHANGE UP
GAS PNL BLDV: 128 MMOL/L — LOW (ref 136–145)
GAS PNL BLDV: SIGNIFICANT CHANGE UP
GAS PNL BLDV: SIGNIFICANT CHANGE UP
GLUCOSE BLDC GLUCOMTR-MCNC: 278 MG/DL — HIGH (ref 70–99)
GLUCOSE BLDC GLUCOMTR-MCNC: 450 MG/DL — CRITICAL HIGH (ref 70–99)
GLUCOSE BLDC GLUCOMTR-MCNC: 457 MG/DL — CRITICAL HIGH (ref 70–99)
GLUCOSE BLDC GLUCOMTR-MCNC: 465 MG/DL — CRITICAL HIGH (ref 70–99)
GLUCOSE BLDV-MCNC: 332 MG/DL — HIGH (ref 70–99)
GLUCOSE SERPL-MCNC: 321 MG/DL — HIGH (ref 70–99)
GLUCOSE SERPL-MCNC: 323 MG/DL — HIGH (ref 70–99)
HCO3 BLDV-SCNC: 26 MMOL/L — SIGNIFICANT CHANGE UP (ref 22–29)
HCT VFR BLD CALC: 28.7 % — LOW (ref 39–50)
HCT VFR BLD CALC: 32.7 % — LOW (ref 39–50)
HCT VFR BLDA CALC: 33 % — LOW (ref 39–51)
HGB BLD CALC-MCNC: 10.9 G/DL — LOW (ref 12.6–17.4)
HGB BLD-MCNC: 10.5 G/DL — LOW (ref 13–17)
HGB BLD-MCNC: 9.4 G/DL — LOW (ref 13–17)
IMM GRANULOCYTES NFR BLD AUTO: 0.4 % — SIGNIFICANT CHANGE UP (ref 0–0.9)
LACTATE BLDV-MCNC: 0.9 MMOL/L — SIGNIFICANT CHANGE UP (ref 0.5–2)
LYMPHOCYTES # BLD AUTO: 0.64 K/UL — LOW (ref 1–3.3)
LYMPHOCYTES # BLD AUTO: 5.4 % — LOW (ref 13–44)
MAGNESIUM SERPL-MCNC: 2.2 MG/DL — SIGNIFICANT CHANGE UP (ref 1.6–2.6)
MCHC RBC-ENTMCNC: 28.1 PG — SIGNIFICANT CHANGE UP (ref 27–34)
MCHC RBC-ENTMCNC: 28.4 PG — SIGNIFICANT CHANGE UP (ref 27–34)
MCHC RBC-ENTMCNC: 32.1 GM/DL — SIGNIFICANT CHANGE UP (ref 32–36)
MCHC RBC-ENTMCNC: 32.8 GM/DL — SIGNIFICANT CHANGE UP (ref 32–36)
MCV RBC AUTO: 86.7 FL — SIGNIFICANT CHANGE UP (ref 80–100)
MCV RBC AUTO: 87.4 FL — SIGNIFICANT CHANGE UP (ref 80–100)
MONOCYTES # BLD AUTO: 0.8 K/UL — SIGNIFICANT CHANGE UP (ref 0–0.9)
MONOCYTES NFR BLD AUTO: 6.8 % — SIGNIFICANT CHANGE UP (ref 2–14)
NEUTROPHILS # BLD AUTO: 9.26 K/UL — HIGH (ref 1.8–7.4)
NEUTROPHILS NFR BLD AUTO: 78.3 % — HIGH (ref 43–77)
NRBC # BLD: 0 /100 WBCS — SIGNIFICANT CHANGE UP (ref 0–0)
NRBC # BLD: 0 /100 WBCS — SIGNIFICANT CHANGE UP (ref 0–0)
NT-PROBNP SERPL-SCNC: HIGH PG/ML (ref 0–300)
PCO2 BLDV: 44 MMHG — SIGNIFICANT CHANGE UP (ref 42–55)
PH BLDV: 7.38 — SIGNIFICANT CHANGE UP (ref 7.32–7.43)
PLATELET # BLD AUTO: 311 K/UL — SIGNIFICANT CHANGE UP (ref 150–400)
PLATELET # BLD AUTO: 350 K/UL — SIGNIFICANT CHANGE UP (ref 150–400)
PO2 BLDV: 15 MMHG — LOW (ref 25–45)
POTASSIUM BLDV-SCNC: 5.4 MMOL/L — HIGH (ref 3.5–5.1)
POTASSIUM SERPL-MCNC: 5.2 MMOL/L — SIGNIFICANT CHANGE UP (ref 3.5–5.3)
POTASSIUM SERPL-MCNC: 5.4 MMOL/L — HIGH (ref 3.5–5.3)
POTASSIUM SERPL-SCNC: 5.2 MMOL/L — SIGNIFICANT CHANGE UP (ref 3.5–5.3)
POTASSIUM SERPL-SCNC: 5.4 MMOL/L — HIGH (ref 3.5–5.3)
PROT SERPL-MCNC: 6.6 G/DL — SIGNIFICANT CHANGE UP (ref 6–8.3)
RBC # BLD: 3.31 M/UL — LOW (ref 4.2–5.8)
RBC # BLD: 3.74 M/UL — LOW (ref 4.2–5.8)
RBC # FLD: 14.5 % — SIGNIFICANT CHANGE UP (ref 10.3–14.5)
RBC # FLD: 14.6 % — HIGH (ref 10.3–14.5)
RSV RNA NPH QL NAA+NON-PROBE: SIGNIFICANT CHANGE UP
SAO2 % BLDV: 17.8 % — LOW (ref 67–88)
SARS-COV-2 RNA SPEC QL NAA+PROBE: SIGNIFICANT CHANGE UP
SODIUM SERPL-SCNC: 131 MMOL/L — LOW (ref 135–145)
SODIUM SERPL-SCNC: 132 MMOL/L — LOW (ref 135–145)
TROPONIN T, HIGH SENSITIVITY RESULT: 347 NG/L — HIGH (ref 0–51)
TROPONIN T, HIGH SENSITIVITY RESULT: 87 NG/L — HIGH (ref 0–51)
TROPONIN T, HIGH SENSITIVITY RESULT: 96 NG/L — HIGH (ref 0–51)
WBC # BLD: 11.44 K/UL — HIGH (ref 3.8–10.5)
WBC # BLD: 11.83 K/UL — HIGH (ref 3.8–10.5)
WBC # FLD AUTO: 11.44 K/UL — HIGH (ref 3.8–10.5)
WBC # FLD AUTO: 11.83 K/UL — HIGH (ref 3.8–10.5)

## 2024-04-16 PROCEDURE — 78582 LUNG VENTILAT&PERFUS IMAGING: CPT | Mod: 26

## 2024-04-16 PROCEDURE — 93970 EXTREMITY STUDY: CPT | Mod: 26

## 2024-04-16 PROCEDURE — 71045 X-RAY EXAM CHEST 1 VIEW: CPT | Mod: 26

## 2024-04-16 PROCEDURE — 93010 ELECTROCARDIOGRAM REPORT: CPT | Mod: 76

## 2024-04-16 RX ORDER — DEXTROSE 10 % IN WATER 10 %
125 INTRAVENOUS SOLUTION INTRAVENOUS ONCE
Refills: 0 | Status: DISCONTINUED | OUTPATIENT
Start: 2024-04-16 | End: 2024-04-19

## 2024-04-16 RX ORDER — CLOPIDOGREL BISULFATE 75 MG/1
75 TABLET, FILM COATED ORAL DAILY
Refills: 0 | Status: DISCONTINUED | OUTPATIENT
Start: 2024-04-16 | End: 2024-04-19

## 2024-04-16 RX ORDER — DEXTROSE 50 % IN WATER 50 %
15 SYRINGE (ML) INTRAVENOUS ONCE
Refills: 0 | Status: DISCONTINUED | OUTPATIENT
Start: 2024-04-16 | End: 2024-04-19

## 2024-04-16 RX ORDER — ATORVASTATIN CALCIUM 80 MG/1
80 TABLET, FILM COATED ORAL AT BEDTIME
Refills: 0 | Status: DISCONTINUED | OUTPATIENT
Start: 2024-04-16 | End: 2024-04-19

## 2024-04-16 RX ORDER — INSULIN LISPRO 100/ML
VIAL (ML) SUBCUTANEOUS AT BEDTIME
Refills: 0 | Status: DISCONTINUED | OUTPATIENT
Start: 2024-04-16 | End: 2024-04-19

## 2024-04-16 RX ORDER — INSULIN LISPRO 100/ML
2 VIAL (ML) SUBCUTANEOUS ONCE
Refills: 0 | Status: COMPLETED | OUTPATIENT
Start: 2024-04-16 | End: 2024-04-16

## 2024-04-16 RX ORDER — FUROSEMIDE 40 MG
40 TABLET ORAL ONCE
Refills: 0 | Status: COMPLETED | OUTPATIENT
Start: 2024-04-16 | End: 2024-04-16

## 2024-04-16 RX ORDER — HEPARIN SODIUM 5000 [USP'U]/ML
INJECTION INTRAVENOUS; SUBCUTANEOUS
Qty: 25000 | Refills: 0 | Status: DISCONTINUED | OUTPATIENT
Start: 2024-04-16 | End: 2024-04-18

## 2024-04-16 RX ORDER — INSULIN LISPRO 100/ML
VIAL (ML) SUBCUTANEOUS
Refills: 0 | Status: DISCONTINUED | OUTPATIENT
Start: 2024-04-16 | End: 2024-04-19

## 2024-04-16 RX ORDER — GLUCAGON INJECTION, SOLUTION 0.5 MG/.1ML
1 INJECTION, SOLUTION SUBCUTANEOUS ONCE
Refills: 0 | Status: DISCONTINUED | OUTPATIENT
Start: 2024-04-16 | End: 2024-04-19

## 2024-04-16 RX ORDER — SODIUM CHLORIDE 9 MG/ML
1000 INJECTION, SOLUTION INTRAVENOUS
Refills: 0 | Status: DISCONTINUED | OUTPATIENT
Start: 2024-04-16 | End: 2024-04-19

## 2024-04-16 RX ORDER — HEPARIN SODIUM 5000 [USP'U]/ML
5000 INJECTION INTRAVENOUS; SUBCUTANEOUS EVERY 6 HOURS
Refills: 0 | Status: DISCONTINUED | OUTPATIENT
Start: 2024-04-16 | End: 2024-04-18

## 2024-04-16 RX ORDER — ISOSORBIDE DINITRATE 5 MG/1
20 TABLET ORAL THREE TIMES A DAY
Refills: 0 | Status: DISCONTINUED | OUTPATIENT
Start: 2024-04-16 | End: 2024-04-17

## 2024-04-16 RX ORDER — INFLUENZA VIRUS VACCINE 15; 15; 15; 15 UG/.5ML; UG/.5ML; UG/.5ML; UG/.5ML
0.7 SUSPENSION INTRAMUSCULAR ONCE
Refills: 0 | Status: DISCONTINUED | OUTPATIENT
Start: 2024-04-16 | End: 2024-04-19

## 2024-04-16 RX ORDER — FUROSEMIDE 40 MG
20 TABLET ORAL DAILY
Refills: 0 | Status: DISCONTINUED | OUTPATIENT
Start: 2024-04-16 | End: 2024-04-19

## 2024-04-16 RX ORDER — NITROGLYCERIN 6.5 MG
0.4 CAPSULE, EXTENDED RELEASE ORAL
Refills: 0 | Status: DISCONTINUED | OUTPATIENT
Start: 2024-04-16 | End: 2024-04-19

## 2024-04-16 RX ORDER — INSULIN LISPRO 100/ML
5 VIAL (ML) SUBCUTANEOUS ONCE
Refills: 0 | Status: COMPLETED | OUTPATIENT
Start: 2024-04-16 | End: 2024-04-16

## 2024-04-16 RX ORDER — DEXTROSE 50 % IN WATER 50 %
12.5 SYRINGE (ML) INTRAVENOUS ONCE
Refills: 0 | Status: DISCONTINUED | OUTPATIENT
Start: 2024-04-16 | End: 2024-04-19

## 2024-04-16 RX ORDER — DEXTROSE 50 % IN WATER 50 %
25 SYRINGE (ML) INTRAVENOUS ONCE
Refills: 0 | Status: DISCONTINUED | OUTPATIENT
Start: 2024-04-16 | End: 2024-04-19

## 2024-04-16 RX ORDER — INSULIN GLARGINE 100 [IU]/ML
20 INJECTION, SOLUTION SUBCUTANEOUS EVERY MORNING
Refills: 0 | Status: DISCONTINUED | OUTPATIENT
Start: 2024-04-16 | End: 2024-04-17

## 2024-04-16 RX ORDER — HEPARIN SODIUM 5000 [USP'U]/ML
2500 INJECTION INTRAVENOUS; SUBCUTANEOUS EVERY 6 HOURS
Refills: 0 | Status: DISCONTINUED | OUTPATIENT
Start: 2024-04-16 | End: 2024-04-18

## 2024-04-16 RX ORDER — DAPAGLIFLOZIN 10 MG/1
10 TABLET, FILM COATED ORAL EVERY 24 HOURS
Refills: 0 | Status: DISCONTINUED | OUTPATIENT
Start: 2024-04-16 | End: 2024-04-19

## 2024-04-16 RX ORDER — HEPARIN SODIUM 5000 [USP'U]/ML
5000 INJECTION INTRAVENOUS; SUBCUTANEOUS ONCE
Refills: 0 | Status: COMPLETED | OUTPATIENT
Start: 2024-04-16 | End: 2024-04-16

## 2024-04-16 RX ADMIN — HEPARIN SODIUM 1100 UNIT(S)/HR: 5000 INJECTION INTRAVENOUS; SUBCUTANEOUS at 16:46

## 2024-04-16 RX ADMIN — Medication 5 UNIT(S): at 11:58

## 2024-04-16 RX ADMIN — Medication 6: at 11:58

## 2024-04-16 RX ADMIN — Medication 2 UNIT(S): at 21:42

## 2024-04-16 RX ADMIN — DAPAGLIFLOZIN 10 MILLIGRAM(S): 10 TABLET, FILM COATED ORAL at 16:49

## 2024-04-16 RX ADMIN — ISOSORBIDE DINITRATE 20 MILLIGRAM(S): 5 TABLET ORAL at 16:55

## 2024-04-16 RX ADMIN — CLOPIDOGREL BISULFATE 75 MILLIGRAM(S): 75 TABLET, FILM COATED ORAL at 11:58

## 2024-04-16 RX ADMIN — HEPARIN SODIUM 5000 UNIT(S): 5000 INJECTION INTRAVENOUS; SUBCUTANEOUS at 16:46

## 2024-04-16 RX ADMIN — HEPARIN SODIUM 1100 UNIT(S)/HR: 5000 INJECTION INTRAVENOUS; SUBCUTANEOUS at 20:13

## 2024-04-16 RX ADMIN — Medication 3: at 16:49

## 2024-04-16 RX ADMIN — Medication 0.4 MILLIGRAM(S): at 17:03

## 2024-04-16 RX ADMIN — Medication 4: at 21:42

## 2024-04-16 RX ADMIN — Medication 40 MILLIGRAM(S): at 03:44

## 2024-04-16 RX ADMIN — ATORVASTATIN CALCIUM 80 MILLIGRAM(S): 80 TABLET, FILM COATED ORAL at 21:04

## 2024-04-16 NOTE — PROVIDER CONTACT NOTE (OTHER) - BACKGROUND
pt admitted with chest pain. History of diabetes
Dx - chest pain.   Hx - HTN, CAD, s/p multiple stents, on Plavix, atrial flutter on Eliquis, CHF

## 2024-04-16 NOTE — PATIENT PROFILE ADULT - VISION (WITH CORRECTIVE LENSES IF THE PATIENT USUALLY WEARS THEM):
0700- Received report from [facility]    3954- Assessment completed. Pt lying in bed with eyes closed. Once in the room, pt opened her eyes and reached for her glasses. Pt stated that she slept well. Pt is in good spirits this morning. Brought pt some water as requested. 0800- Pt eating breakfast.     0845- Removed pt's IV from left hand. 0945- Removed wet dressings from pt's chest and right breast. Crushed 2 flagyl tablets and sprinkled over the wound. Discussed with Dr. Esa Ellis that 2 tablets is not enough to cover the area. Per Dr. Esa Ellis: increase flagyl to 4 tablets (1g) daily to be crushed and sprinkled over wounds. 1010- Spoke to Saul at [facility] and informed of increased flagyl dose. He will have more tablets delivered to [facility] so that pt will have enough for the weekend. 1100- Pt's brother Ora Villalobos arrived and is at the bedside. 1300- Pt finishing her lunch. No needs expressed. 1500- Pt talking with friends on the phone. 1700- Removed wet dressings from pt's chest and right breast. Patted the wound gently with gauze to absorb the leaking areas. Applied Ag dressing directly to the outside of the wound. Packed deep areas with Ag dressing. Covered all areas with abd pads then wrapped upper chest with an unfolded incontinence brief. Assisted pt to the [facility]. Pt urinated. While pt was on [facility], this nurse changed linens. Pt ambulated back to the bed. Removed soiled dressing from left arm. Cleansed skin tear with normal saline. Placed guaze squares over wound and wrapped arm with soft cling gauze Set up pt's dinner tray. Call bell within reach. 1830- Pt requested pain medication. PRN Roxanol given per [facility]     1900- Report given to oncoming nurse.        NAME OF PATIENT:  Ranjit Denise    LEVEL OF CARE:  Routine    REASON FOR GIP:   NA    *PATIENT REMAINS ELIGIBLE FOR GIP LEVEL OF CARE AS EVIDENCED BY: (MUST BE ADDRESSED OF PATIENT GIP)      REASON FOR RESPITE:  NA    O2 SAFETY:  NA    FALL INTERVENTIONS PROVIDED:   Implemented/recommended use of non-skid footwear, Implemented/recommended resources for alarm system (personal alarm, bed alarm, call bell, etc.) , Implemented/recommended environmental changes (remove hazards, lower bed, improve lighting, etc.) and Implemented/recommended increased supervision/assistance    INTERDISPLINARY COMMUNICATION/COLLABORATION:  Physician, MSW, Leena and RN, CNA    NEW MEDICATION INITIATION DOCUMENTATION:  NA    Reason medication is being initiated:  NA    MD / Provider name consulted re: change in status / initiation of new medication:  NA    New Symptom(s):  NA    New Order(s):  NA    Name of the person notified of the changes:  NA    Name of person being taught:  NA    Instructions given:  NA    Side Effects taught:  NA    Response to teaching:  NA      COMFORTABLE DYING MEASURE:  Is Patient/family satisfied with symptom level?  yes    DISCHARGE PLAN:  Pt will return home upon completion of her routine stay.  Pt will continue to be followed by the home hospice team. Partially impaired: cannot see medication labels or newsprint, but can see obstacles in path, and the surrounding layout; can count fingers at arm's length

## 2024-04-16 NOTE — ED PROVIDER NOTE - PHYSICAL EXAMINATION
GEN: NAD, awake, eyes open spontaneously  EYES: normal conjunctiva, perrl  ENT: NCAT, MMM, Trachea midline  CHEST/LUNGS: Non-tachypneic, CTAB, bilateral breath sounds  CARDIAC: Tachycardic, normal perfusion  ABDOMEN: Soft, NTND, No rebound/guarding  MSK: 1+ pitting edema bilateral shins, no gross deformity of extremities  SKIN: No rashes, no petechiae, no vesicles  NEURO: CN grossly intact, normal coordination, no focal motor or sensory deficits  PSYCH: Alert, appropriate, cooperative, with capacity and insight

## 2024-04-16 NOTE — PATIENT PROFILE ADULT - FALL HARM RISK - HARM RISK INTERVENTIONS

## 2024-04-16 NOTE — ED ADULT NURSE NOTE - OBJECTIVE STATEMENT
Pt is a 69y M PMH CHF, T2DM, CAD w/ multiple stents, Afib on Eliquis c/o of SOB, CLEARY x 2 wks, increased fatigue, weakness, decreased PO intake. Pt states he is normally able to ambulate w/out difficulty however has recently needed to take multiple breaks to catch breath. Pt states he has to rest and will feel better. Pt endorses chills, no fevers. Pt endorses increased weakness, fatigue, sleeping more. Pt denies chest pain/ discomfort. Pt denies ha, dizziness, vision changes, blurry vision, NVD, constipation, abd pain, urinary symptoms. Pt is A&Ox3, breathing unlabored and spontaneous, full ROM of all extremities. Pt placed on cardiac monitor, IV access obtained. Pt resting in stretcher, bed in lowest position, aware of plan of care. Comfort and safety measures maintained.

## 2024-04-16 NOTE — PROVIDER CONTACT NOTE (OTHER) - DATE AND TIME:
Received phone call from infection control. Special contact isolation is recommended for pt. Pt is to stay in the room until further clarification from infection control if pt can come out of room for therapies.  
16-Apr-2024 11:08
16-Apr-2024 18:31

## 2024-04-16 NOTE — ED PROVIDER NOTE - CLINICAL SUMMARY MEDICAL DECISION MAKING FREE TEXT BOX
69 year old male with hx of HTN, CAD, s/p multiple stents, on plavix, atrial flutter on eliquis, CHF, presents to the ED with persistent shortness of breath with exertion, cough, and exertional chest pain x 2 weeks. Per records, patient was admitted at [facility] from 1/5-1/9/24 after presenting with chest pain and ruling in for NSTEMI. He underwent cardiac cath 1/8/24 with Dr. Julito Dominguez; drug eluting stents were deployed at sites of his 80% proximal LAD lesion, 80% mid-LAD lesion, and 70% proximal left main lesion. He then returned on 1/28/24 after his cardiac enzymes from 1/26/24 returned elevated. He underwent cardiac cath 1/29/24; his stents were patent. Differential includes but not limited to ACS, PNA, bronchitis, viral URI, CHF exacerbation. Will obtain cbc, cmp, bnp, trop, ecg, cxr, and likely require admission for high risk chest pain. 69 year old male with hx of HTN, CAD, s/p multiple stents, on plavix, atrial flutter on eliquis, CHF, presents to the ED with persistent shortness of breath with exertion, cough, and exertional chest pain x 2 weeks. Per records, patient was admitted at [facility] from 1/5-1/9/24 after presenting with chest pain and ruling in for NSTEMI. He underwent cardiac cath 1/8/24 with Dr. Julito Dominguez; drug eluting stents were deployed at sites of his 80% proximal LAD lesion, 80% mid-LAD lesion, and 70% proximal left main lesion. He then returned on 1/28/24 after his cardiac enzymes from 1/26/24 returned elevated. He underwent cardiac cath 1/29/24; his stents were patent. Differential includes but not limited to ACS, PNA, bronchitis, viral URI, CHF exacerbation, symptomatic anemia. Will obtain cbc, cmp, bnp, trop, ecg, cxr, and likely require admission for high risk chest pain.

## 2024-04-16 NOTE — PROVIDER CONTACT NOTE (OTHER) - ACTION/TREATMENT ORDERED:
Nitroglycerin administered with relief in pain.   Isordil administered as ordered.   EKG. 2L NC applied, placed on pulse ox. Tele monitoring continued.
Notify provider. give sliding scale and additional 5 units of insulin coverage.

## 2024-04-16 NOTE — H&P ADULT - HISTORY OF PRESENT ILLNESS
68 y/o male with hx of CAD s/p multiple  stents (most recent to pLM (70%), pLAD, mLAD on 1/8/24 with Dr. Dominguez), HFrEF (EF~50% as of 11/26/23), A-fib, HTN, HLD, T2DM, CKD3, former smoke a/w worsening sob.  Pt reports that he had ran out of eliquis  few  weeks ago and restarted ( was off approximately two weeks )   around the same time pt was c\having , cough , chills and dyspnea : was given a course of abx however wiht no imporvment ..  his Dysnpea worsened to a point where it was evident on minimal exertion.  he still gets exertional CP however not as servere and does not need nitro SL as often   no orhtopnea  or PND   not sure if worsening edema   no fever or chills over the past few days  no N/V/D/urinary sx

## 2024-04-16 NOTE — ED PROVIDER NOTE - OBJECTIVE STATEMENT
He was admitted at [facility] from 1/5-1/9/24 after presenting with chest pain and ruling in for NSTEMI. He underwent cardiac cath 1/8/24 with Dr. Julito Dominguez; drug eluting stents were deployed at sites of his 80% proximal LAD lesion, 80% mid-LAD lesion, and 70% proximal left main lesion. he came to the ER on Sunday 1/28/24 after his cardiac enzymes from 1/26/24 returned elevated. He underwent cardiac cath 1/29/24; his stents were patent. 69 year old male with hx of HTN, CAD, s/p multiple stents, on plavix, atrial flutter on eliquis, CHF, presents to the ED complaining of shortness of breath with exertion x 2 weeks with dry cough. Associated generalized weakness and chills. States after walking he develops fatigue, where he feels like he needs to stop in order to catch his breath. During these episodes he also describes midsternal nonradiating chest pain. He states these symptoms feel similar to prior cardiac events requiring stents. States he was recently on an antibiotics 1.5 weeks ago for cough and sob with no improvement of symptoms. Denies fever, abdominal pain, nausea, vomiting, melena, rectal bleeding.

## 2024-04-16 NOTE — ED PROVIDER NOTE - ATTENDING CONTRIBUTION TO CARE
Patient with history of CAD with stents, a flutter on Eliquis, CHF, presents with dyspnea on exertion and dry cough along with fatigue.  Workup was done to assess for acute CHF exacerbation versus ischemia versus infection.  Patient will be admitted for diuresis, ACS rule out, reevaluation

## 2024-04-16 NOTE — PROVIDER CONTACT NOTE (OTHER) - ASSESSMENT
Pt A&O*4. no complaints of discomfort, headache or dizziness. Pt states that he ate just before being brought to holding in ED. Pt states that he ate cherrios and a breakfast tray just before checking sugar level. Pt states that he did not take long acting insulin last night as well.
Patient AOx4.   Complaining of non-radiating, generalized chest pain 8/10.   Denies shortness of breath, dizziness, or palpitation. On tele he is Afib -140.  He is requesting for nitroglycerin.

## 2024-04-16 NOTE — H&P ADULT - ASSESSMENT
70 y/o male with hx of CAD s/p multiple  stents (most recent to pLM (70%), pLAD, mLAD on 1/8/24 with Dr. Dominguez), HFrEF (EF~50% as of 11/26/23), A-fib, HTN, HLD, T2DM, CKD3, former smoke a/w worsening sob.  Pt reports that he had ran out of eliquis  few  weeks ago and restarted ( was off approximately two weeks )   around the same time pt was c\having , cough , chills and dyspnea : was given a course of abx however wiht no imporvment ..  his Dysnpea worsened to a point where it was evident on minimal exertion.  he still gets exertional CP however not as servere and does not need nitro SL as often   no orhtopnea  or PND   not sure if worsening edema   no fever or chills over the past few days  no N/V/D/urinary sx       Dyspnea:   check VA duplex r/o dvt and Vq  r/o PE given pt was off eliquis for over a week  no pna no cxr   consider ct chest r/o pna   however no fever and no elevated WBC     hx of CHF :  with clear lungs and worsening edema of LE   iv diuretics     DM: uncontrolled   insulin and consult endo     Afib :  start heparin   cont rate control      BARRY: renal input       CP : reports that his CP is on exertion, seems to be requiring less nitro   d/w  : no inidcation for cath at this time   will cont to monitor and if no improvement will need to re consider

## 2024-04-16 NOTE — ED PROVIDER NOTE - NS ED ROS FT
CONSTITUTIONAL: No fevers, + chills, no lightheadedness, no dizziness, generalized weakness   NOSE: no nasal congestion  MOUTH/THROAT: no sore throat  CV: + chest pain, no palpitations  RESP: + SOB, + cough  GI: No n/v/d, no abd pain  : no dysuria, no hematuria, no flank pain  MSK: no back pain, no extremity pain  SKIN: no rashes  NEURO: no headache, no focal weakness

## 2024-04-16 NOTE — PROVIDER CONTACT NOTE (OTHER) - SITUATION
glucose checked prior to lunch
Complaining of chest pain 8/10, upon arrival to the floor from ED.  Afib -140.

## 2024-04-16 NOTE — CONSULT NOTE ADULT - SUBJECTIVE AND OBJECTIVE BOX
70 y/o man well known to our group, best to Dr Moon, h/o severe CAD S/P multiple PCI's including multiple LM and LAD stents. +A-fib, HTN, HLD, T2DM, CKD3, former smoker, On his most recent angiogram he was felt to have poor distal targets and was deemed not a good surgical candidate.  Now presents to the ED complaining of shortness of breath with exertion x 2 weeks with dry cough. Ran out of eliquis over one week ago. States that anginal pattern improved since addition isosorbide. NO significant chest pain with exertion, actually using less sl ntg. Seen at bedside with Dr Mcdaniel. Troponin elevated.    PAST MEDICAL & SURGICAL HISTORY:  Former smoker      CAD in native artery      Type 2 diabetes mellitus      Hyperlipidemia, unspecified hyperlipidemia type      Essential hypertension, hypertension with unspecified goal      Afib      Hematoma of leg      Stented coronary artery      CHF (congestive heart failure)      s/p Carotid Endarterectomy  left          Allergies    No Known Allergies    Intolerances        SOCIAL HISTORY    Smoking Hx:  ETOH Hx:  Marital Status:  Occupational Hx:    FAMILY HISTORY:  Family history of heart disease  father  age 71    FH: atrial fibrillation  sister        MEDICATIONS:  atorvastatin 80 milliGRAM(s) Oral at bedtime  clopidogrel Tablet 75 milliGRAM(s) Oral daily  dapagliflozin 10 milliGRAM(s) Oral every 24 hours  dextrose 10% Bolus 125 milliLiter(s) IV Bolus once  dextrose 5%. 1000 milliLiter(s) IV Continuous <Continuous>  dextrose 5%. 1000 milliLiter(s) IV Continuous <Continuous>  dextrose 50% Injectable 25 Gram(s) IV Push once  dextrose 50% Injectable 12.5 Gram(s) IV Push once  dextrose Oral Gel 15 Gram(s) Oral once PRN  furosemide   Injectable 20 milliGRAM(s) IV Push daily  glucagon  Injectable 1 milliGRAM(s) IntraMuscular once  insulin lispro (ADMELOG) corrective regimen sliding scale   SubCutaneous three times a day before meals  insulin lispro (ADMELOG) corrective regimen sliding scale   SubCutaneous at bedtime  insulin lispro Injectable (ADMELOG). 5 Unit(s) SubCutaneous once  isosorbide   dinitrate Tablet (ISORDIL) 20 milliGRAM(s) Oral three times a day      REVIEW OF SYSTEMS:    CONSTITUTIONAL: No weakness, fevers or chills  EYES/ENT: No visual changes;  No vertigo or throat pain   NECK: No pain or stiffness  RESPIRATORY: No cough, wheezing, hemoptysis; No shortness of breath  CARDIOVASCULAR: No chest pain or palpitations  GASTROINTESTINAL: No abdominal or epigastric pain. No nausea, vomiting, or hematemesis; No diarrhea or constipation. No melena or hematochezia.  GENITOURINARY: No dysuria, frequency or hematuria  NEUROLOGICAL: No numbness or weakness  SKIN: No itching, burning, rashes, or lesions   All other review of systems is negative unless indicated above    Vital Signs Last 24 Hrs  T(C): 37.1 (2024 10:45), Max: 37.4 (2024 06:00)  T(F): 98.7 (2024 10:45), Max: 99.4 (2024 06:00)  HR: 99 (2024 10:45) (80 - 110)  BP: 150/84 (2024 10:45) (145/83 - 182/68)  BP(mean): 109 (2024 06:00) (102 - 109)  RR: 18 (2024 10:45) (17 - 25)  SpO2: 100% (2024 10:45) (95% - 100%)    Parameters below as of 2024 10:45  Patient On (Oxygen Delivery Method): room air        I&O's Summary    2024 07:01  -  2024 11:40  --------------------------------------------------------  IN: 0 mL / OUT: 1500 mL / NET: -1500 mL        PHYSICAL EXAM:    Constitutional: NAD, awake and alert, well-developed  HEENT: PERR, EOMI  Neck: soft and supple, No LAD, No JVD  Respiratory: Breath sounds are clear bilaterally, No wheezing, rales or rhonchi  Cardiovascular: irregular normal s1s2  Gastrointestinal: Bowel Sounds present, soft, nontender.   Extremities: No peripheral edema. No clubbing or cyanosis.  Vascular: 2+ peripheral pulses  Neurological: A/O x 3, no focal deficits  Musculoskeletal: no calf tenderness.  Skin: No rashes.      LABS: All Labs Reviewed:                        10.5   11.83 )-----------( 350      ( 2024 02:40 )             32.7     2024 02:40    132    |  100    |  47     ----------------------------<  321    5.4     |  21     |  2.06     Ca    9.3        2024 02:40    TPro  6.6    /  Alb  3.4    /  TBili  1.1    /  DBili  x      /  AST  93     /  ALT  75     /  AlkPhos  157    2024 02:40      EKG: unable to locate.

## 2024-04-16 NOTE — ED PROVIDER NOTE - PROGRESS NOTE DETAILS
Lizette Maravilla, DO (PGY-1): Trop 87, with 3 hour repeat 96. ECG with atrial flutter. Endorsed to Dr. Mcdaniel for admission.

## 2024-04-16 NOTE — ED ADULT NURSE NOTE - NSFALLUNIVINTERV_ED_ALL_ED
Bed/Stretcher in lowest position, wheels locked, appropriate side rails in place/Call bell, personal items and telephone in reach/Instruct patient to call for assistance before getting out of bed/chair/stretcher/Non-slip footwear applied when patient is off stretcher/[place] to call system/Physically safe environment - no spills, clutter or unnecessary equipment/Purposeful proactive rounding/Room/bathroom lighting operational, light cord in reach

## 2024-04-16 NOTE — CONSULT NOTE ADULT - ASSESSMENT
#ASHD s/p multiple pci.   # HFrEF (EF~50%, 11/26/23)  #A-fib  #HTN  #HL  #T2DM  #CKD   #Cough/dyspnea/ ran out of NOAC: Agree with LE duplex and v/q (not cta due to ckd). Heparinize, eventual restart NOAC.   Consider abx as per IM.   Troponin elevation likely demand ischemia. Please obtain ekg.   Would continue current cardiac meds at present.   Discussed with patient and Dr Mcdaniel.

## 2024-04-17 DIAGNOSIS — I10 ESSENTIAL (PRIMARY) HYPERTENSION: ICD-10-CM

## 2024-04-17 DIAGNOSIS — E11.9 TYPE 2 DIABETES MELLITUS WITHOUT COMPLICATIONS: ICD-10-CM

## 2024-04-17 DIAGNOSIS — I50.9 HEART FAILURE, UNSPECIFIED: ICD-10-CM

## 2024-04-17 LAB
A1C WITH ESTIMATED AVERAGE GLUCOSE RESULT: 7.6 % — HIGH (ref 4–5.6)
ALBUMIN SERPL ELPH-MCNC: 3.2 G/DL — LOW (ref 3.3–5)
ALP SERPL-CCNC: 127 U/L — HIGH (ref 40–120)
ALT FLD-CCNC: 76 U/L — HIGH (ref 10–45)
ANION GAP SERPL CALC-SCNC: 13 MMOL/L — SIGNIFICANT CHANGE UP (ref 5–17)
APTT BLD: 160 SEC — CRITICAL HIGH (ref 24.5–35.6)
APTT BLD: 85 SEC — HIGH (ref 24.5–35.6)
AST SERPL-CCNC: 109 U/L — HIGH (ref 10–40)
BASOPHILS # BLD AUTO: 0.06 K/UL — SIGNIFICANT CHANGE UP (ref 0–0.2)
BASOPHILS NFR BLD AUTO: 0.6 % — SIGNIFICANT CHANGE UP (ref 0–2)
BILIRUB SERPL-MCNC: 1.5 MG/DL — HIGH (ref 0.2–1.2)
BUN SERPL-MCNC: 41 MG/DL — HIGH (ref 7–23)
CALCIUM SERPL-MCNC: 9.4 MG/DL — SIGNIFICANT CHANGE UP (ref 8.4–10.5)
CHLORIDE SERPL-SCNC: 100 MMOL/L — SIGNIFICANT CHANGE UP (ref 96–108)
CO2 SERPL-SCNC: 23 MMOL/L — SIGNIFICANT CHANGE UP (ref 22–31)
CREAT SERPL-MCNC: 1.74 MG/DL — HIGH (ref 0.5–1.3)
EGFR: 42 ML/MIN/1.73M2 — LOW
EOSINOPHIL # BLD AUTO: 0.66 K/UL — HIGH (ref 0–0.5)
EOSINOPHIL NFR BLD AUTO: 6.4 % — HIGH (ref 0–6)
ESTIMATED AVERAGE GLUCOSE: 171 MG/DL — HIGH (ref 68–114)
GLUCOSE BLDC GLUCOMTR-MCNC: 183 MG/DL — HIGH (ref 70–99)
GLUCOSE BLDC GLUCOMTR-MCNC: 190 MG/DL — HIGH (ref 70–99)
GLUCOSE BLDC GLUCOMTR-MCNC: 193 MG/DL — HIGH (ref 70–99)
GLUCOSE BLDC GLUCOMTR-MCNC: 242 MG/DL — HIGH (ref 70–99)
GLUCOSE BLDC GLUCOMTR-MCNC: 374 MG/DL — HIGH (ref 70–99)
GLUCOSE SERPL-MCNC: 190 MG/DL — HIGH (ref 70–99)
HCT VFR BLD CALC: 31 % — LOW (ref 39–50)
HGB BLD-MCNC: 10 G/DL — LOW (ref 13–17)
IMM GRANULOCYTES NFR BLD AUTO: 0.6 % — SIGNIFICANT CHANGE UP (ref 0–0.9)
LYMPHOCYTES # BLD AUTO: 0.93 K/UL — LOW (ref 1–3.3)
LYMPHOCYTES # BLD AUTO: 9 % — LOW (ref 13–44)
MCHC RBC-ENTMCNC: 28.1 PG — SIGNIFICANT CHANGE UP (ref 27–34)
MCHC RBC-ENTMCNC: 32.3 GM/DL — SIGNIFICANT CHANGE UP (ref 32–36)
MCV RBC AUTO: 87.1 FL — SIGNIFICANT CHANGE UP (ref 80–100)
MONOCYTES # BLD AUTO: 0.69 K/UL — SIGNIFICANT CHANGE UP (ref 0–0.9)
MONOCYTES NFR BLD AUTO: 6.6 % — SIGNIFICANT CHANGE UP (ref 2–14)
NEUTROPHILS # BLD AUTO: 7.98 K/UL — HIGH (ref 1.8–7.4)
NEUTROPHILS NFR BLD AUTO: 76.8 % — SIGNIFICANT CHANGE UP (ref 43–77)
NRBC # BLD: 0 /100 WBCS — SIGNIFICANT CHANGE UP (ref 0–0)
PLATELET # BLD AUTO: 350 K/UL — SIGNIFICANT CHANGE UP (ref 150–400)
POTASSIUM SERPL-MCNC: 4.4 MMOL/L — SIGNIFICANT CHANGE UP (ref 3.5–5.3)
POTASSIUM SERPL-SCNC: 4.4 MMOL/L — SIGNIFICANT CHANGE UP (ref 3.5–5.3)
PROT SERPL-MCNC: 6.4 G/DL — SIGNIFICANT CHANGE UP (ref 6–8.3)
RBC # BLD: 3.56 M/UL — LOW (ref 4.2–5.8)
RBC # FLD: 14.6 % — HIGH (ref 10.3–14.5)
SODIUM SERPL-SCNC: 136 MMOL/L — SIGNIFICANT CHANGE UP (ref 135–145)
TROPONIN T, HIGH SENSITIVITY RESULT: 594 NG/L — HIGH (ref 0–51)
WBC # BLD: 10.38 K/UL — SIGNIFICANT CHANGE UP (ref 3.8–10.5)
WBC # FLD AUTO: 10.38 K/UL — SIGNIFICANT CHANGE UP (ref 3.8–10.5)

## 2024-04-17 PROCEDURE — 92928 PRQ TCAT PLMT NTRAC ST 1 LES: CPT | Mod: LD

## 2024-04-17 PROCEDURE — 93010 ELECTROCARDIOGRAM REPORT: CPT

## 2024-04-17 PROCEDURE — 99152 MOD SED SAME PHYS/QHP 5/>YRS: CPT

## 2024-04-17 PROCEDURE — 93454 CORONARY ARTERY ANGIO S&I: CPT | Mod: 26,59

## 2024-04-17 RX ORDER — INSULIN GLARGINE 100 [IU]/ML
10 INJECTION, SOLUTION SUBCUTANEOUS AT BEDTIME
Refills: 0 | Status: DISCONTINUED | OUTPATIENT
Start: 2024-04-17 | End: 2024-04-18

## 2024-04-17 RX ORDER — INSULIN LISPRO 100/ML
5 VIAL (ML) SUBCUTANEOUS
Refills: 0 | Status: DISCONTINUED | OUTPATIENT
Start: 2024-04-17 | End: 2024-04-18

## 2024-04-17 RX ORDER — ISOSORBIDE DINITRATE 5 MG/1
30 TABLET ORAL THREE TIMES A DAY
Refills: 0 | Status: DISCONTINUED | OUTPATIENT
Start: 2024-04-17 | End: 2024-04-19

## 2024-04-17 RX ORDER — ASPIRIN/CALCIUM CARB/MAGNESIUM 324 MG
81 TABLET ORAL DAILY
Refills: 0 | Status: DISCONTINUED | OUTPATIENT
Start: 2024-04-17 | End: 2024-04-19

## 2024-04-17 RX ORDER — METOPROLOL TARTRATE 50 MG
25 TABLET ORAL
Refills: 0 | Status: DISCONTINUED | OUTPATIENT
Start: 2024-04-17 | End: 2024-04-19

## 2024-04-17 RX ADMIN — HEPARIN SODIUM 0 UNIT(S)/HR: 5000 INJECTION INTRAVENOUS; SUBCUTANEOUS at 01:33

## 2024-04-17 RX ADMIN — ISOSORBIDE DINITRATE 30 MILLIGRAM(S): 5 TABLET ORAL at 20:00

## 2024-04-17 RX ADMIN — HEPARIN SODIUM 900 UNIT(S)/HR: 5000 INJECTION INTRAVENOUS; SUBCUTANEOUS at 10:16

## 2024-04-17 RX ADMIN — ISOSORBIDE DINITRATE 20 MILLIGRAM(S): 5 TABLET ORAL at 05:16

## 2024-04-17 RX ADMIN — Medication 5 UNIT(S): at 13:27

## 2024-04-17 RX ADMIN — HEPARIN SODIUM 900 UNIT(S)/HR: 5000 INJECTION INTRAVENOUS; SUBCUTANEOUS at 02:41

## 2024-04-17 RX ADMIN — Medication 1: at 09:20

## 2024-04-17 RX ADMIN — Medication 20 MILLIGRAM(S): at 05:16

## 2024-04-17 RX ADMIN — INSULIN GLARGINE 10 UNIT(S): 100 INJECTION, SOLUTION SUBCUTANEOUS at 22:42

## 2024-04-17 RX ADMIN — CLOPIDOGREL BISULFATE 75 MILLIGRAM(S): 75 TABLET, FILM COATED ORAL at 12:21

## 2024-04-17 RX ADMIN — ATORVASTATIN CALCIUM 80 MILLIGRAM(S): 80 TABLET, FILM COATED ORAL at 22:42

## 2024-04-17 RX ADMIN — Medication 5: at 13:26

## 2024-04-17 RX ADMIN — ISOSORBIDE DINITRATE 20 MILLIGRAM(S): 5 TABLET ORAL at 12:21

## 2024-04-17 RX ADMIN — Medication 25 MILLIGRAM(S): at 20:00

## 2024-04-17 RX ADMIN — DAPAGLIFLOZIN 10 MILLIGRAM(S): 10 TABLET, FILM COATED ORAL at 20:01

## 2024-04-17 NOTE — PROGRESS NOTE ADULT - ASSESSMENT
68 y/o male with hx of CAD s/p multiple  stents (most recent to pLM (70%), pLAD, mLAD on 1/8/24 with Dr. Dominguez), HFrEF (EF~50% as of 11/26/23), A-fib, HTN, HLD, T2DM, CKD3, former smoke a/w worsening sob.  Pt reports that he had ran out of eliquis  few  weeks ago and restarted ( was off approximately two weeks )   around the same time pt was c\having , cough , chills and dyspnea : was given a course of abx however wiht no imporvment ..  his Dysnpea worsened to a point where it was evident on minimal exertion.  he still gets exertional CP however not as servere and does not need nitro SL as often   no orhtopnea  or PND   not sure if worsening edema   no fever or chills over the past few days  no N/V/D/urinary sx       Dyspnea:   check VA duplex r/o dvt and Vq  r/o PE given pt was off eliquis for over a week : bnoth of which are negative   no pna no cxr   consider ct chest r/o pna   however no fever and no elevated WBC     hx of CHF :  with clear lungs and worsening edema of LE   iv diuretics for acute on chronic CHF Diastolic     DM: uncontrolled   insulin and consulted endo     Afib :  start heparin   cont rate control      BARRY: fu with renal       CP : reports that his CP is on exertion, seems to be requiring less nitro   d/w cardio  will plan cath   d/w pt and acp

## 2024-04-17 NOTE — CONSULT NOTE ADULT - ASSESSMENT
68 y/o male with hx of CAD s/p multiple  stents (most recent to pLM (70%), pLAD, mLAD on 1/8/24 with Dr. Dominguez), HFrEF , A-fib, HTN, HLD, T2DM,  CKD3, former smoker  Assessment  DMT2: 69y Male with DM T2 with hyperglycemia, A1C 7.6% , was on oral meds and insulin at home (Farxiga and Semglee basal insulin), now on basal bolus insulins.   CAD: on medications, stable, monitored.   HTN: on antihypertensive medications, monitored, asymptomatic.  CKD: Monitor labs/BMP, renal follow up      Discussed plan and management with Dr Flo Jamil NP - TEAMS  Myrna Rossi MD  Cell: [phone]  Office: [phone]              70 y/o male with hx of CAD s/p multiple  stents (most recent to pLM (70%), pLAD, mLAD on 1/8/24 with Dr. Dominguez), HFrEF , A-fib, HTN, HLD, T2DM,  CKD3, former smoker  Assessment  DMT2: 69y Male with DM T2 with hyperglycemia, A1C 7.6% , was on oral meds and insulin at home (Farxiga and Semglee basal insulin), now on basal  bolus insulins.   CAD: on medications, stable, monitored.   HTN: on antihypertensive medications, monitored, asymptomatic.  CKD: Monitor labs/BMP, renal follow up      Discussed plan and management with Dr Flo Jamil NP - TEAMS  Myrna Rossi MD  Cell: [phone]  Office: [phone]

## 2024-04-17 NOTE — PROVIDER CONTACT NOTE (CRITICAL VALUE NOTIFICATION) - BACKGROUND
Patient admitted for chest pain. On heparin infusion at 1100 u/hr, full anticoagulation nomogram. Target aptt is 58-99.

## 2024-04-17 NOTE — CONSULT NOTE ADULT - SUBJECTIVE AND OBJECTIVE BOX
HPI:  68 y/o male with hx of CAD s/p multiple  stents (most recent to pLM (70%), pLAD, mLAD on 24 with Dr. Dominguez), HFrEF (EF~50% as of 23), A-fib, HTN, HLD, T2DM, CKD3, former smoke a/w worsening sob.  Pt reports that he had ran out of eliquis  few  weeks ago and restarted ( was off approximately two weeks )   around the same time pt was c, cough , chills and dyspnea : was given a course of abx however wiht no imporvment .  his Dysnpea worsened to a point where it was evident on minimal exertion.  he still gets exertional CP however not as servere and does not need nitro SL as often   no orhtopnea  or PND   not sure if worsening edema   no fever or chills over the past few days  no N/V/D/urinary sx    (2024 11:38)      Patient has history of diabetes, A1C 7.6 % on home insulins.   Endo was consulted for glycemic control.      PAST MEDICAL & SURGICAL HISTORY:  Former smoker      Essential hypertension, hypertension with unspecified goal      Afib      Hematoma of leg      Stented coronary artery      CHF (congestive heart failure)      CAD in native artery      Type 2 diabetes mellitus      Hyperlipidemia, unspecified hyperlipidemia type      s/p Carotid Endarterectomy  left          FAMILY HISTORY:  Family history of heart disease  father  age 71    FH: atrial fibrillation  sister        Social History:  no etoh   no smoking (2024 11:38)            HOME MEDICATIONS:  Home Medications:  atorvastatin 80 mg oral tablet: 1 tab(s) orally once a day (at bedtime) (2024 09:33)  clopidogrel 75 mg oral tablet: 1 tab(s) orally once a day (2024 09:33)  ezetimibe 10 mg oral tablet: 1 tab(s) orally once a day (2024 09:33)  Farxiga 10 mg oral tablet: 1 tab(s) orally once a day (2024 09:33)  furosemide 40 mg oral tablet: 1 tab(s) orally once a day (2024 09:33)  multivitamin gummies: 1 gummy orally once a day (2024 09:33)  Semglee (Prefilled Pen) 100 units/mL subcutaneous solution: 28-30 units once a day (2024 09:35)  valsartan 40 mg oral tablet: 1 tab(s) orally once a day ***NOTE: AS PER PATIENT THE ENTRESTO (49-51MG LAST FILLED) WAS STOPPED AND HES ONLY TAKING VALSARTAN. VALSARTAN LAST FILLED OCT #90. (2024 09:35)            MEDICATIONS  (STANDING):  atorvastatin 80 milliGRAM(s) Oral at bedtime  clopidogrel Tablet 75 milliGRAM(s) Oral daily  dapagliflozin 10 milliGRAM(s) Oral every 24 hours  dextrose 10% Bolus 125 milliLiter(s) IV Bolus once  dextrose 5%. 1000 milliLiter(s) (50 mL/Hr) IV Continuous <Continuous>  dextrose 5%. 1000 milliLiter(s) (100 mL/Hr) IV Continuous <Continuous>  dextrose 50% Injectable 25 Gram(s) IV Push once  dextrose 50% Injectable 12.5 Gram(s) IV Push once  furosemide   Injectable 20 milliGRAM(s) IV Push daily  glucagon  Injectable 1 milliGRAM(s) IntraMuscular once  heparin  Infusion.  Unit(s)/Hr (11 mL/Hr) IV Continuous <Continuous>  influenza  Vaccine (HIGH DOSE) 0.7 milliLiter(s) IntraMuscular once  insulin glargine Injectable (LANTUS) 10 Unit(s) SubCutaneous at bedtime  insulin lispro (ADMELOG) corrective regimen sliding scale   SubCutaneous three times a day before meals  insulin lispro (ADMELOG) corrective regimen sliding scale   SubCutaneous at bedtime  insulin lispro Injectable (ADMELOG) 5 Unit(s) SubCutaneous three times a day before meals  isosorbide   dinitrate Tablet (ISORDIL) 30 milliGRAM(s) Oral three times a day  metoprolol tartrate 25 milliGRAM(s) Oral two times a day    MEDICATIONS  (PRN):  dextrose Oral Gel 15 Gram(s) Oral once PRN Blood Glucose LESS THAN 70 milliGRAM(s)/deciliter  heparin   Injectable 5000 Unit(s) IV Push every 6 hours PRN For aPTT less than 40  heparin   Injectable 2500 Unit(s) IV Push every 6 hours PRN For aPTT between 40 - 57  nitroglycerin     SubLingual 0.4 milliGRAM(s) SubLingual every 5 minutes PRN Chest Pain      Allergies    No Known Allergies    Intolerances        Review of Systems:  Neuro: No HA, no dizziness  Cardiovascular: No chest pain, no palpitations  Respiratory: no SOB, no cough  GI: No nausea, vomiting, abdominal pain  MSK: Denies joint/muscle pain      ALL OTHER SYSTEMS REVIEWED AND NEGATIVE      PHYSICAL EXAM:  VITALS: T(C): 37 (24 @ 12:15)  T(F): 98.6 (24 @ 12:15), Max: 99.3 (24 @ 20:21)  HR: 107 (24 @ 12:15) (67 - 150)  BP: 135/70 (24 @ 12:15) (126/63 - 172/79)  RR:  (17 - 18)  SpO2:  (90% - 99%)  Wt(kg): --  GENERAL: NAD, well-groomed, well-developed  NEURO:  alert and oriented  RESPIRATORY: Clear to auscultation bilaterally; No rales, rhonchi, wheezing  CARDIOVASCULAR: Si S2  GI: Soft, non distended, normal bowel sounds  MUSCULOSKELETAL: Moves all extremities equally       POCT Blood Glucose.: 374 mg/dL (24 @ 12:57)  POCT Blood Glucose.: 183 mg/dL (24 @ 08:55)  POCT Blood Glucose.: 242 mg/dL (24 @ 01:58)  POCT Blood Glucose.: 450 mg/dL (24 @ 21:16)  POCT Blood Glucose.: 278 mg/dL (24 @ 16:30)  POCT Blood Glucose.: 457 mg/dL (24 @ 11:24)  POCT Blood Glucose.: 465 mg/dL (24 @ 11:22)                            10.0   10.38 )-----------( 350      ( 2024 06:46 )             31.0           136  |  100  |  41<H>  ----------------------------<  190<H>  4.4   |  23  |  1.74<H>    eGFR: 42<L>    Ca    9.4        Mg     2.2         TPro  6.4  /  Alb  3.2<L>  /  TBili  1.5<H>  /  DBili  x   /  AST  109<H>  /  ALT  76<H>  /  AlkPhos  127<H>        Thyroid Function Tests:    Diet, Regular:   Consistent Carbohydrate No Snacks (CSTCHO)  DASH/TLC Sodium & Cholesterol Restricted (DASH)  No Concentrated Potassium  Low Sodium (24 @ 07:59) [Active]         Chol 90 Direct LDL -- LDL calculated 36 HDL 40<L> Trig 61  A1C with Estimated Average Glucose Result: 7.6 % (24 @ 06:46)  A1C with Estimated Average Glucose Result: 7.7 % (24 @ 03:52)  A1C with Estimated Average Glucose Result: 6.7 % (24 @ 02:59)  A1C with Estimated Average Glucose Result: 6.6 % (23 @ 05:27)  A1C with Estimated Average Glucose Result: 8.4 % (23 @ 07:28)                     HPI:  68 y/o male with hx of CAD s/p multiple  stents (most recent to pLM (70%), pLAD, mLAD on 24 with Dr. Dominguez), HFrEF (EF~50% as of 23), A-fib,  HTN, HLD, T2DM, CKD3, former smoke a/w worsening sob.  Pt reports that he had ran out of eliquis  few  weeks ago and restarted ( was off approximately two weeks )   around the same time pt was c, cough , chills and dyspnea : was given a course of abx however wiht no imporvment .  his Dysnpea worsened to a point where it was evident on minimal exertion.  he still gets exertional CP however not as servere and does not need nitro SL as often   no orhtopnea  or PND   not sure if worsening edema   no fever or chills over the past few days  no N/V/D/urinary sx    (2024 11:38)      Patient has history of diabetes, A1C 7.6 % on home insulins.   Endo was consulted for glycemic control.      PAST MEDICAL & SURGICAL HISTORY:  Former smoker      Essential hypertension, hypertension with unspecified goal      Afib      Hematoma of leg      Stented coronary artery      CHF (congestive heart failure)      CAD in native artery      Type 2 diabetes mellitus      Hyperlipidemia, unspecified hyperlipidemia type      s/p Carotid Endarterectomy  left          FAMILY HISTORY:  Family history of heart disease  father  age 71    FH: atrial fibrillation  sister        Social History:  no etoh   no smoking (2024 11:38)            HOME MEDICATIONS:  Home Medications:  atorvastatin 80 mg oral tablet: 1 tab(s) orally once a day (at bedtime) (2024 09:33)  clopidogrel 75 mg oral tablet: 1 tab(s) orally once a day (2024 09:33)  ezetimibe 10 mg oral tablet: 1 tab(s) orally once a day (2024 09:33)  Farxiga 10 mg oral tablet: 1 tab(s) orally once a day (2024 09:33)  furosemide 40 mg oral tablet: 1 tab(s) orally once a day (2024 09:33)  multivitamin gummies: 1 gummy orally once a day (2024 09:33)  Semglee (Prefilled Pen) 100 units/mL subcutaneous solution: 28-30 units once a day (2024 09:35)  valsartan 40 mg oral tablet: 1 tab(s) orally once a day ***NOTE: AS PER PATIENT THE ENTRESTO (49-51MG LAST FILLED) WAS STOPPED AND HES ONLY TAKING VALSARTAN. VALSARTAN LAST FILLED OCT #90. (2024 09:35)            MEDICATIONS  (STANDING):  atorvastatin 80 milliGRAM(s) Oral at bedtime  clopidogrel Tablet 75 milliGRAM(s) Oral daily  dapagliflozin 10 milliGRAM(s) Oral every 24 hours  dextrose 10% Bolus 125 milliLiter(s) IV Bolus once  dextrose 5%. 1000 milliLiter(s) (50 mL/Hr) IV Continuous <Continuous>  dextrose 5%. 1000 milliLiter(s) (100 mL/Hr) IV Continuous <Continuous>  dextrose 50% Injectable 25 Gram(s) IV Push once  dextrose 50% Injectable 12.5 Gram(s) IV Push once  furosemide   Injectable 20 milliGRAM(s) IV Push daily  glucagon  Injectable 1 milliGRAM(s) IntraMuscular once  heparin  Infusion.  Unit(s)/Hr (11 mL/Hr) IV Continuous <Continuous>  influenza  Vaccine (HIGH DOSE) 0.7 milliLiter(s) IntraMuscular once  insulin glargine Injectable (LANTUS) 10 Unit(s) SubCutaneous at bedtime  insulin lispro (ADMELOG) corrective regimen sliding scale   SubCutaneous three times a day before meals  insulin lispro (ADMELOG) corrective regimen sliding scale   SubCutaneous at bedtime  insulin lispro Injectable (ADMELOG) 5 Unit(s) SubCutaneous three times a day before meals  isosorbide   dinitrate Tablet (ISORDIL) 30 milliGRAM(s) Oral three times a day  metoprolol tartrate 25 milliGRAM(s) Oral two times a day    MEDICATIONS  (PRN):  dextrose Oral Gel 15 Gram(s) Oral once PRN Blood Glucose LESS THAN 70 milliGRAM(s)/deciliter  heparin   Injectable 5000 Unit(s) IV Push every 6 hours PRN For aPTT less than 40  heparin   Injectable 2500 Unit(s) IV Push every 6 hours PRN For aPTT between 40 - 57  nitroglycerin     SubLingual 0.4 milliGRAM(s) SubLingual every 5 minutes PRN Chest Pain      Allergies    No Known Allergies    Intolerances        Review of Systems:  Neuro: No HA, no dizziness  Cardiovascular: No chest pain, no palpitations  Respiratory: no SOB, no cough  GI: No nausea, vomiting, abdominal pain  MSK: Denies joint/muscle pain      ALL OTHER SYSTEMS REVIEWED AND NEGATIVE      PHYSICAL EXAM:  VITALS: T(C): 37 (24 @ 12:15)  T(F): 98.6 (24 @ 12:15), Max: 99.3 (24 @ 20:21)  HR: 107 (24 @ 12:15) (67 - 150)  BP: 135/70 (24 @ 12:15) (126/63 - 172/79)  RR:  (17 - 18)  SpO2:  (90% - 99%)  Wt(kg): --  GENERAL: NAD, well-groomed, well-developed  NEURO:  alert and oriented  RESPIRATORY: Clear to auscultation bilaterally; No rales, rhonchi, wheezing  CARDIOVASCULAR: Si S2  GI: Soft, non distended, normal bowel sounds  MUSCULOSKELETAL: Moves all extremities equally       POCT Blood Glucose.: 374 mg/dL (24 @ 12:57)  POCT Blood Glucose.: 183 mg/dL (24 @ 08:55)  POCT Blood Glucose.: 242 mg/dL (24 @ 01:58)  POCT Blood Glucose.: 450 mg/dL (24 @ 21:16)  POCT Blood Glucose.: 278 mg/dL (24 @ 16:30)  POCT Blood Glucose.: 457 mg/dL (24 @ 11:24)  POCT Blood Glucose.: 465 mg/dL (24 @ 11:22)                            10.0   10.38 )-----------( 350      ( 2024 06:46 )             31.0           136  |  100  |  41<H>  ----------------------------<  190<H>  4.4   |  23  |  1.74<H>    eGFR: 42<L>    Ca    9.4        Mg     2.2         TPro  6.4  /  Alb  3.2<L>  /  TBili  1.5<H>  /  DBili  x   /  AST  109<H>  /  ALT  76<H>  /  AlkPhos  127<H>        Thyroid Function Tests:    Diet, Regular:   Consistent Carbohydrate No Snacks (CSTCHO)  DASH/TLC Sodium & Cholesterol Restricted (DASH)  No Concentrated Potassium  Low Sodium (24 @ 07:59) [Active]         Chol 90 Direct LDL -- LDL calculated 36 HDL 40<L> Trig 61  A1C with Estimated Average Glucose Result: 7.6 % (24 @ 06:46)  A1C with Estimated Average Glucose Result: 7.7 % (24 @ 03:52)  A1C with Estimated Average Glucose Result: 6.7 % (24 @ 02:59)  A1C with Estimated Average Glucose Result: 6.6 % (23 @ 05:27)  A1C with Estimated Average Glucose Result: 8.4 % (23 @ 07:28)

## 2024-04-17 NOTE — CHART NOTE - NSCHARTNOTEFT_GEN_A_CORE
Notified by RN pt w/ FS of 450 @ 21:16. VSS, pt asymptomatic. Pt given 4 U Admelog from pt's low ISS w/ an additional 6 U of Admelog. STAT BMP and beta hydroxybutyrate ordered; results stable. Repeat FS ordered for 2 AM to assess for further insulin needs; will f/u. Will continue to monitor and reassess. C/w current medical management. Will endorse to day team in AM, attending to follow.    Ned Caro PA-C  Department of Medicine Notified by RN pt w/ FS of 450 @ 21:16. VSS, pt asymptomatic. Pt given 4 U Admelog from pt's low ISS w/ an additional 6 U of Admelog. STAT BMP and beta hydroxybutyrate ordered; results stable. Repeat FS ordered for 2 AM to assess for further insulin needs; will f/u. Will continue to monitor and reassess. C/w current medical management. Will endorse to day team in AM, attending to follow.    Ned Caro PA-C  Department of Medicine      ADDENDUM: 2 AM fingerstick 242. Will continue to monitor at this time. Pt to receive 20 U Lantus in the AM per pt's insulin regimen. Day team and attending to follow.

## 2024-04-17 NOTE — PROGRESS NOTE ADULT - SUBJECTIVE AND OBJECTIVE BOX
Date of service: 24 @ 15:54      Patient is a 69y old  Male who presents with a chief complaint of NSTEMI (2024 11:47)                                                               INTERVAL HPI/OVERNIGHT EVENTS:    REVIEW OF SYSTEMS:     CONSTITUTIONAL: No weakness, fevers or chills  RESPIRATORY: improving cough and sob   CARDIOVASCULAR: chest pain on minimal exertion   GASTROINTESTINAL: No abdominal pain  . No nausea, vomiting, or hematemesis; No diarrhea or constipation. No melena or hematochezia.  GENITOURINARY: No dysuria, frequency or hematuria  NEUROLOGICAL: No numbness or weakness                                                                                                                                                                                                                                                                                   Medications:  MEDICATIONS  (STANDING):  atorvastatin 80 milliGRAM(s) Oral at bedtime  clopidogrel Tablet 75 milliGRAM(s) Oral daily  dapagliflozin 10 milliGRAM(s) Oral every 24 hours  dextrose 10% Bolus 125 milliLiter(s) IV Bolus once  dextrose 5%. 1000 milliLiter(s) (50 mL/Hr) IV Continuous <Continuous>  dextrose 5%. 1000 milliLiter(s) (100 mL/Hr) IV Continuous <Continuous>  dextrose 50% Injectable 25 Gram(s) IV Push once  dextrose 50% Injectable 12.5 Gram(s) IV Push once  furosemide   Injectable 20 milliGRAM(s) IV Push daily  glucagon  Injectable 1 milliGRAM(s) IntraMuscular once  heparin  Infusion.  Unit(s)/Hr (11 mL/Hr) IV Continuous <Continuous>  influenza  Vaccine (HIGH DOSE) 0.7 milliLiter(s) IntraMuscular once  insulin glargine Injectable (LANTUS) 10 Unit(s) SubCutaneous at bedtime  insulin lispro (ADMELOG) corrective regimen sliding scale   SubCutaneous at bedtime  insulin lispro (ADMELOG) corrective regimen sliding scale   SubCutaneous three times a day before meals  insulin lispro Injectable (ADMELOG) 5 Unit(s) SubCutaneous three times a day before meals  isosorbide   dinitrate Tablet (ISORDIL) 30 milliGRAM(s) Oral three times a day  metoprolol tartrate 25 milliGRAM(s) Oral two times a day    MEDICATIONS  (PRN):  dextrose Oral Gel 15 Gram(s) Oral once PRN Blood Glucose LESS THAN 70 milliGRAM(s)/deciliter  heparin   Injectable 5000 Unit(s) IV Push every 6 hours PRN For aPTT less than 40  heparin   Injectable 2500 Unit(s) IV Push every 6 hours PRN For aPTT between 40 - 57  nitroglycerin     SubLingual 0.4 milliGRAM(s) SubLingual every 5 minutes PRN Chest Pain       Allergies    No Known Allergies    Intolerances      Vital Signs Last 24 Hrs  T(C): 37 (2024 12:15), Max: 37.4 (2024 20:21)  T(F): 98.6 (2024 12:15), Max: 99.3 (2024 20:21)  HR: 107 (2024 12:15) (67 - 150)  BP: 135/70 (2024 12:15) (126/63 - 172/79)  BP(mean): 92 (2024 12:15) (92 - 92)  RR: 18 (2024 12:15) (17 - 18)  SpO2: 99% (2024 12:15) (90% - 99%)    Parameters below as of 2024 12:15  Patient On (Oxygen Delivery Method): nasal cannula      CAPILLARY BLOOD GLUCOSE      POCT Blood Glucose.: 374 mg/dL (2024 12:57)  POCT Blood Glucose.: 183 mg/dL (2024 08:55)  POCT Blood Glucose.: 242 mg/dL (2024 01:58)  POCT Blood Glucose.: 450 mg/dL (2024 21:16)  POCT Blood Glucose.: 278 mg/dL (2024 16:30)       @ 07:  -   @ 07:00  --------------------------------------------------------  IN: 460 mL / OUT: 3850 mL / NET: -3390 mL     @ 07:  -   @ 15:54  --------------------------------------------------------  IN: 0 mL / OUT: 700 mL / NET: -700 mL      Physical Exam:    Daily     Daily Weight in k.5 (2024 05:44)  General:  Well appearing, NAD, not cachetic  HEENT:  Nonicteric, PERRLA  CV:  RRR, S1S2   Lungs:  CTA B/L, no wheezes, rales, rhonchi  Abdomen:  Soft, non-tender, no distended, positive BS  Extremities:  =edema   Neuro:  AAOx3, non-focal, grossly intact                                                                                                                                                                                                                                                                                                LABS:                               10.0   10.38 )-----------( 350      ( 2024 06:46 )             31.0                          136  |  100  |  41<H>  ----------------------------<  190<H>  4.4   |  23  |  1.74<H>    Ca    9.4      2024 06:48  Mg     2.2         TPro  6.4  /  Alb  3.2<L>  /  TBili  1.5<H>  /  DBili  x   /  AST  109<H>  /  ALT  76<H>  /  AlkPhos  127<H>                         RADIOLOGY & ADDITIONAL TESTS         I personally reviewed: [  ]EKG   [  ]CXR    [  ] CT      A/P:         Discussed with :     Derek consultants' Notes   Time spent :

## 2024-04-17 NOTE — PROGRESS NOTE ADULT - SUBJECTIVE AND OBJECTIVE BOX
Yesterday had multiple episodes of chest pain and this morning feels better  No dyspnea or chest pain at present  /79  HR 67 AF  Lungs clear  Irregular rhythm  No edema    Troponin up to 594  BUN 41  Crt 1.74  WBC 10.38  Hgb 10.0  Hct 31.0   Plt 350  PTT 85  SGOT 109  SGPT 76    V/Q - very low probability for PE    LE Doppler - no DVT    CXR - clear    Imp:  NSTEMI - He has been turned down for surgery in the past   He was also not amenable for PCI on his most recent Cath of 1/29/24  Rec:  Continue IV Heparin and Plavix   Start Metoprolol 25 mg bid   Increase Isordil to 30 mg tid [Negative] : Heme/Lymph

## 2024-04-17 NOTE — CONSULT NOTE ADULT - SUBJECTIVE AND OBJECTIVE BOX
HPI: Mr. Velázquez is a 69 year-old man with history of multiple medical issues including hypertension, type 2 diabetes mellitus, coronary artery disease, congestive heart failure with borderline reduced EF, atrial fibrillation, and chronic kidney disease. He presented yesterday to the [facility] ER with progressively worsening dyspnea for 1-2 weeks, in setting of having run out of Eliquis a few weeks ago. He also endorsed cough, and chills; he received a course of oral antibiotics. He has not had fever or chills for the past few days. He has chronic exertional chest pain for which he takes SL nitroglycerin. He is known to me from the inpatient and outpatient setting.    PAST MEDICAL & SURGICAL HISTORY:  HTN  HLD  DM2  CAD - stent  AFib  CHF (EF~50%)  CEA-L  CKD    Allergies  No Known Allergies    SOCIAL HISTORY:  (+) ex-smoker    FAMILY HISTORY:  Family history of heart disease -father  age 71  FH: atrial fibrillation - sister    REVIEW OF SYSTEMS:  CONSTITUTIONAL: No weakness; (+)chills  EYES/ENT: No visual changes;  No vertigo or throat pain   NECK: No pain or stiffness  RESPIRATORY: (+)SOB, (+)cough  CARDIOVASCULAR: (+)chest pain  GASTROINTESTINAL: No abdominal or epigastric pain. No nausea, vomiting, or hematemesis; No diarrhea or constipation. No melena or hematochezia.  GENITOURINARY: No dysuria, frequency or hematuria  NEUROLOGICAL: No numbness or weakness  SKIN: No itching, burning, rashes, or lesions   All other review of systems is negative unless indicated above.    VITAL:  T(C): , Max: 37.4 (24 @ 20:21)  T(F): , Max: 99.3 (24 @ 20:21)  HR: 67 (24 @ 04:06)  BP: 135/79 (24 @ 04:06)  RR: 18 (24 @ 04:06)  SpO2: 98% (24 @ 04:06)    PHYSICAL EXAM:  Constitutional: NAD, Alert  HEENT: NCAT, MMM  Neck: Supple, No JVD  Respiratory: CTA-b/l  Cardiovascular: RRR s1s2, no m/r/g  Gastrointestinal: BS+, soft, NT/ND  Extremities: No peripheral edema b/l  Neurological: no focal deficits; strength grossly intact  Back: no CVAT b/l  Skin: No rashes, no nevi    LABS:                        10.0   10.38 )-----------( 350      ( 2024 06:46 )             31.0     Na(131)/K(5.2)/Cl(97)/HCO3(21)/BUN(45)/Cr(1.77)Glu(323)/Ca(8.9)/Mg(--)/PO4(--)     @ 21:53  Na(--)/K(--)/Cl(--)/HCO3(--)/BUN(--)/Cr(--)Glu(--)/Ca(--)/Mg(2.2)/PO4(--)     @ 05:44  Na(132)/K(5.4)/Cl(100)/HCO3(21)/BUN(47)/Cr(2.06)Glu(321)/Ca(9.3)/Mg(--)/PO4(--)     @ 02:40      IMAGING:  < from: NM Pulmonary Ventilation/Perfusion Scan (24 @ 15:54) >  Very low probability of pulmonary embolus.    < from: VA Duplex Lower Ext Vein Scan, Bilat (24 @ 13:23) >  No evidence of deep venous thrombosis in either lower extremity.    < from: Xray Chest 1 View- PORTABLE-Urgent (Xray Chest 1 View- PORTABLE-Urgent .) (24 @ 03:52) >  Clear lungs.        ASSESSMENT:  (1)Renal - CKD3b - nonproteinuric; due to hypertension/atherosclerotic disease. At/near baseline  (2)Hyperkalemia - mild - likely multifactorial from CKD, Valsartan, and cellular shifts in association with hyperglycemia. ARB on hold for now  (3)Cards - SOB on admission - clear lungs on CXR and very low probability of PE on VQ. Are his symptoms all due to recent URI? Cardiology on board    RECOMMEND:  (1)No ACEI/ARB for now  (2)Low-K diet for now  (3)No objection to IV diuretics for now  (4)Glycemic control per primary team/Endocrine  (5)BMP daily while admitted  (6)Dose new meds for GFR 30-35ml/min    Thank you for involving [facility] in this patient's care.    With warm regards,    Markell Walton MD   [facility]  Office: [phone]  Cell: [phone]             HPI: Mr. Velázquez is a 69 year-old man with history of multiple medical issues including hypertension, type 2 diabetes mellitus, coronary artery disease, congestive heart failure with borderline reduced EF, atrial fibrillation, and chronic kidney disease. He presented yesterday to the [facility] ER with progressively worsening dyspnea for 1-2 weeks, in setting of having run out of Eliquis a few weeks ago. He also endorsed cough, and chills; he received a course of oral antibiotics. He has not had fever or chills for the past few days. He has chronic exertional chest pain for which he takes SL nitroglycerin. He is known to me from the inpatient and outpatient setting.    Mr. Velázquez shares that he is feeling much better today. He is no longer dyspnic.    PAST MEDICAL & SURGICAL HISTORY:  HTN  HLD  DM2  CAD - stent  AFib  CHF (EF~50%)  CEA-L  CKD    Allergies  No Known Allergies    SOCIAL HISTORY:  (+) ex-smoker    FAMILY HISTORY:  Family history of heart disease -father  age 71  FH: atrial fibrillation - sister    REVIEW OF SYSTEMS:  CONSTITUTIONAL: No weakness; (+)chills  EYES/ENT: No visual changes;  No vertigo or throat pain   NECK: No pain or stiffness  RESPIRATORY: (+)SOB, (+)cough  CARDIOVASCULAR: (+)chest pain  GASTROINTESTINAL: No abdominal or epigastric pain. No nausea, vomiting, or hematemesis; No diarrhea or constipation. No melena or hematochezia.  GENITOURINARY: No dysuria, frequency or hematuria  NEUROLOGICAL: No numbness or weakness  SKIN: No itching, burning, rashes, or lesions   All other review of systems is negative unless indicated above.    VITAL:  T(C): , Max: 37.4 (24 @ 20:21)  T(F): , Max: 99.3 (24 @ 20:21)  HR: 67 (24 @ 04:06)  BP: 135/79 (24 @ 04:06)  RR: 18 (24 @ 04:06)  SpO2: 98% (24 @ 04:06)    PHYSICAL EXAM:  Constitutional: NAD, Alert  HEENT: NCAT, MMM  Neck: Supple, No JVD  Respiratory: CTA-b/l  Cardiovascular: RRR s1s2, no m/r/g  Gastrointestinal: BS+, soft, NT/ND  Extremities: No peripheral edema b/l  Neurological: no focal deficits; strength grossly intact  Back: no CVAT b/l  Skin: No rashes, no nevi    LABS:                        10.0   10.38 )-----------( 350      ( 2024 06:46 )             31.0     Na(131)/K(5.2)/Cl(97)/HCO3(21)/BUN(45)/Cr(1.77)Glu(323)/Ca(8.9)/Mg(--)/PO4(--)     @ 21:53  Na(--)/K(--)/Cl(--)/HCO3(--)/BUN(--)/Cr(--)Glu(--)/Ca(--)/Mg(2.2)/PO4(--)     @ 05:44  Na(132)/K(5.4)/Cl(100)/HCO3(21)/BUN(47)/Cr(2.06)Glu(321)/Ca(9.3)/Mg(--)/PO4(--)     @ 02:40      IMAGING:  < from: NM Pulmonary Ventilation/Perfusion Scan (24 @ 15:54) >  Very low probability of pulmonary embolus.    < from: VA Duplex Lower Ext Vein Scan, Bilat (24 @ 13:23) >  No evidence of deep venous thrombosis in either lower extremity.    < from: Xray Chest 1 View- PORTABLE-Urgent (Xray Chest 1 View- PORTABLE-Urgent .) (24 @ 03:52) >  Clear lungs.        ASSESSMENT:  (1)Renal - CKD3b - nonproteinuric; due to hypertension/atherosclerotic disease. At/near baseline  (2)Hyperkalemia - mild - likely multifactorial from CKD, Valsartan, and cellular shifts in association with hyperglycemia. ARB on hold for now  (3)Cards - SOB on admission - clear lungs on CXR and very low probability of PE on VQ. Are his symptoms all due to recent URI? Cardiology on board    RECOMMEND:  (1)No ACEI/ARB for now  (2)Low-K diet for now  (3)No objection to IV diuretics for now  (4)Glycemic control per primary team/Endocrine  (5)BMP daily while admitted  (6)Dose new meds for GFR 30-35ml/min    Thank you for involving [facility] in this patient's care.    With warm regards,    Markell Walton MD   [facility]  Office: [phone]  Cell: [phone]

## 2024-04-17 NOTE — CHART NOTE - NSCHARTNOTEFT_GEN_A_CORE
Repeat troponin level this evening 4/16 was 347 from 96. Pt w/o CP at this time. As per cardiology's note from 4/16 AM, pt's elevated troponin level is likely 2/2 demand ischemia. Repeat EKG was done showing no significant changes from previous on 4/15. The cardiology fellow overnight was contacted; states as long as the pt is w/o symptoms, a troponin level can be repeated in the morning w/ AM labs (was ordered). C/w heparin drip and plavix. F/u cardiology in AM for further recommendations. Will continue to monitor and reassess. Will endorse to day team in AM, attending to follow.    Ned Caro PA-C  Department of Medicine

## 2024-04-18 LAB
ANION GAP SERPL CALC-SCNC: 14 MMOL/L — SIGNIFICANT CHANGE UP (ref 5–17)
APTT BLD: 58.7 SEC — HIGH (ref 24.5–35.6)
APTT BLD: 63.6 SEC — HIGH (ref 24.5–35.6)
BUN SERPL-MCNC: 43 MG/DL — HIGH (ref 7–23)
CALCIUM SERPL-MCNC: 10 MG/DL — SIGNIFICANT CHANGE UP (ref 8.4–10.5)
CHLORIDE SERPL-SCNC: 96 MMOL/L — SIGNIFICANT CHANGE UP (ref 96–108)
CO2 SERPL-SCNC: 24 MMOL/L — SIGNIFICANT CHANGE UP (ref 22–31)
CREAT SERPL-MCNC: 1.83 MG/DL — HIGH (ref 0.5–1.3)
EGFR: 40 ML/MIN/1.73M2 — LOW
GLUCOSE BLDC GLUCOMTR-MCNC: 192 MG/DL — HIGH (ref 70–99)
GLUCOSE BLDC GLUCOMTR-MCNC: 195 MG/DL — HIGH (ref 70–99)
GLUCOSE BLDC GLUCOMTR-MCNC: 341 MG/DL — HIGH (ref 70–99)
GLUCOSE BLDC GLUCOMTR-MCNC: 352 MG/DL — HIGH (ref 70–99)
GLUCOSE BLDC GLUCOMTR-MCNC: 367 MG/DL — HIGH (ref 70–99)
GLUCOSE SERPL-MCNC: 202 MG/DL — HIGH (ref 70–99)
HCT VFR BLD CALC: 34.3 % — LOW (ref 39–50)
HGB BLD-MCNC: 10.6 G/DL — LOW (ref 13–17)
MCHC RBC-ENTMCNC: 27.2 PG — SIGNIFICANT CHANGE UP (ref 27–34)
MCHC RBC-ENTMCNC: 30.9 GM/DL — LOW (ref 32–36)
MCV RBC AUTO: 88.2 FL — SIGNIFICANT CHANGE UP (ref 80–100)
NRBC # BLD: 0 /100 WBCS — SIGNIFICANT CHANGE UP (ref 0–0)
PLATELET # BLD AUTO: 406 K/UL — HIGH (ref 150–400)
POTASSIUM SERPL-MCNC: 4.7 MMOL/L — SIGNIFICANT CHANGE UP (ref 3.5–5.3)
POTASSIUM SERPL-SCNC: 4.7 MMOL/L — SIGNIFICANT CHANGE UP (ref 3.5–5.3)
RBC # BLD: 3.89 M/UL — LOW (ref 4.2–5.8)
RBC # FLD: 15.4 % — HIGH (ref 10.3–14.5)
SODIUM SERPL-SCNC: 134 MMOL/L — LOW (ref 135–145)
WBC # BLD: 11.88 K/UL — HIGH (ref 3.8–10.5)
WBC # FLD AUTO: 11.88 K/UL — HIGH (ref 3.8–10.5)

## 2024-04-18 PROCEDURE — 99231 SBSQ HOSP IP/OBS SF/LOW 25: CPT

## 2024-04-18 RX ORDER — HEPARIN SODIUM 5000 [USP'U]/ML
5000 INJECTION INTRAVENOUS; SUBCUTANEOUS EVERY 6 HOURS
Refills: 0 | Status: DISCONTINUED | OUTPATIENT
Start: 2024-04-18 | End: 2024-04-18

## 2024-04-18 RX ORDER — APIXABAN 2.5 MG/1
2.5 TABLET, FILM COATED ORAL EVERY 12 HOURS
Refills: 0 | Status: DISCONTINUED | OUTPATIENT
Start: 2024-04-18 | End: 2024-04-19

## 2024-04-18 RX ORDER — HEPARIN SODIUM 5000 [USP'U]/ML
2500 INJECTION INTRAVENOUS; SUBCUTANEOUS EVERY 6 HOURS
Refills: 0 | Status: DISCONTINUED | OUTPATIENT
Start: 2024-04-18 | End: 2024-04-18

## 2024-04-18 RX ORDER — INSULIN LISPRO 100/ML
7 VIAL (ML) SUBCUTANEOUS
Refills: 0 | Status: DISCONTINUED | OUTPATIENT
Start: 2024-04-18 | End: 2024-04-19

## 2024-04-18 RX ORDER — INSULIN GLARGINE 100 [IU]/ML
15 INJECTION, SOLUTION SUBCUTANEOUS AT BEDTIME
Refills: 0 | Status: DISCONTINUED | OUTPATIENT
Start: 2024-04-18 | End: 2024-04-19

## 2024-04-18 RX ORDER — HEPARIN SODIUM 5000 [USP'U]/ML
900 INJECTION INTRAVENOUS; SUBCUTANEOUS
Qty: 25000 | Refills: 0 | Status: DISCONTINUED | OUTPATIENT
Start: 2024-04-18 | End: 2024-04-18

## 2024-04-18 RX ADMIN — ISOSORBIDE DINITRATE 30 MILLIGRAM(S): 5 TABLET ORAL at 14:05

## 2024-04-18 RX ADMIN — Medication 25 MILLIGRAM(S): at 05:56

## 2024-04-18 RX ADMIN — Medication 5 UNIT(S): at 09:37

## 2024-04-18 RX ADMIN — Medication 1: at 09:36

## 2024-04-18 RX ADMIN — Medication 7 UNIT(S): at 18:09

## 2024-04-18 RX ADMIN — Medication 5: at 13:14

## 2024-04-18 RX ADMIN — INSULIN GLARGINE 15 UNIT(S): 100 INJECTION, SOLUTION SUBCUTANEOUS at 22:05

## 2024-04-18 RX ADMIN — Medication 25 MILLIGRAM(S): at 18:10

## 2024-04-18 RX ADMIN — HEPARIN SODIUM 900 UNIT(S)/HR: 5000 INJECTION INTRAVENOUS; SUBCUTANEOUS at 03:41

## 2024-04-18 RX ADMIN — DAPAGLIFLOZIN 10 MILLIGRAM(S): 10 TABLET, FILM COATED ORAL at 18:10

## 2024-04-18 RX ADMIN — Medication 20 MILLIGRAM(S): at 05:56

## 2024-04-18 RX ADMIN — Medication 81 MILLIGRAM(S): at 14:05

## 2024-04-18 RX ADMIN — CLOPIDOGREL BISULFATE 75 MILLIGRAM(S): 75 TABLET, FILM COATED ORAL at 14:05

## 2024-04-18 RX ADMIN — HEPARIN SODIUM 900 UNIT(S)/HR: 5000 INJECTION INTRAVENOUS; SUBCUTANEOUS at 07:55

## 2024-04-18 RX ADMIN — ISOSORBIDE DINITRATE 30 MILLIGRAM(S): 5 TABLET ORAL at 16:30

## 2024-04-18 RX ADMIN — HEPARIN SODIUM 900 UNIT(S)/HR: 5000 INJECTION INTRAVENOUS; SUBCUTANEOUS at 10:36

## 2024-04-18 RX ADMIN — ISOSORBIDE DINITRATE 30 MILLIGRAM(S): 5 TABLET ORAL at 05:56

## 2024-04-18 RX ADMIN — APIXABAN 2.5 MILLIGRAM(S): 2.5 TABLET, FILM COATED ORAL at 20:45

## 2024-04-18 RX ADMIN — Medication 4: at 18:09

## 2024-04-18 RX ADMIN — ATORVASTATIN CALCIUM 80 MILLIGRAM(S): 80 TABLET, FILM COATED ORAL at 22:05

## 2024-04-18 RX ADMIN — Medication 5 UNIT(S): at 13:15

## 2024-04-18 NOTE — DIETITIAN INITIAL EVALUATION ADULT - OTHER INFO
- Cardio: hx of CAD s/p stents, HF, admitted for NSTEMI now s/p DESx1 to distal LAD 4/17; ordered for IV lasix currently  - Renal: CKD3b per nephrology, renal electrolytes currently WDL, was hyperkalemic, recommended no concentrated potassium diet now K WDL

## 2024-04-18 NOTE — PROGRESS NOTE ADULT - ASSESSMENT
Assessment/Plan: 70 y/o man  h/o severe CAD S/P multiple PCI's including multiple LM and LAD stents. +A-flutter/ Afib, HTN, HLD, T2DM, CKD3, former smoker presents with SOB and CP + troponin --> 594 NSTEMI.  - 4/17  :S/P 1 elizabeth to distal LAD (95%) via RFA by Dr. Dominguez.  - Groin site stable.   - Continue DAPT (aspirin 81mg and clopidogrel 75mg), c/w ASA x1 week the discontinue ASA, c/w plavix & AC ( eliquis)    - Continue atorvastatin.  - On iv diuretics for acute on chronic CHF Diastolic.  - Medication adherence stressed to patient with discussion of risks of non-compliance  - Avoid using NSAIDs  (Aleve, Motrin, ibuprofen, naproxen) while on DAPT, please utilize Tylenol for pain control (not to exceed 4gm in 24 hours)  - continue telemetry  - Monitor electrolytes, keep K > 4.0 and Mag >2.0  - Recommend a heart healthy diet which includes a variety of fruits and vegetables, whole grains, low fat dairy products, legumes and skinless poulty and fish; food prepared with little or no salt and minimize processed foods  - Care per primary team.  - F/u By cards Dr Alfred Jack in 2 weeks post discharge.

## 2024-04-18 NOTE — DIETITIAN INITIAL EVALUATION ADULT - NSFNSGIIOFT_GEN_A_CORE
I&O's Detail    17 Apr 2024 07:01  -  18 Apr 2024 07:00  --------------------------------------------------------  IN:    Oral Fluid: 120 mL  Total IN: 120 mL    OUT:    Voided (mL): 2250 mL  Total OUT: 2250 mL    Total NET: -2130 mL

## 2024-04-18 NOTE — PROGRESS NOTE ADULT - ASSESSMENT
70 y/o male with hx of CAD s/p multiple  stents (most recent to pLM (70%), pLAD, mLAD on 1/8/24 with Dr. Dominguez), HFrEF (EF~50% as of 11/26/23), A-fib, HTN, HLD, T2DM, CKD3, former smoke a/w worsening sob.  Pt reports that he had ran out of eliquis  few  weeks ago and restarted ( was off approximately two weeks )   around the same time pt was c\having , cough , chills and dyspnea : was given a course of abx however wiht no imporvment ..  his Dysnpea worsened to a point where it was evident on minimal exertion.  he still gets exertional CP however not as servere and does not need nitro SL as often   no orhtopnea  or PND   not sure if worsening edema   no fever or chills over the past few days  no N/V/D/urinary sx       Dyspnea:   check VA duplex r/o dvt and Vq  r/o PE given pt was off eliquis for over a week : bnoth of which are negative   no pna no cxr   consider ct chest r/o pna   however no fever and no elevated WBC       hx of CHF :  with clear lungs and worsening edema of LE   iv diuretics for acute on chronic CHF Diastolic : change to po in AM     DM: uncontrolled   insulin and consulted endo     Afib :  start heparin   cont rate control      BARRY: fu with renal       NSTEMI  : reports that his CP is on exertion, seems to be requiring less nitro   cath noted : stent placed

## 2024-04-18 NOTE — PROGRESS NOTE ADULT - SUBJECTIVE AND OBJECTIVE BOX
Overnight events noted      VITAL:  T(C): , Max: 37.3 (04-18-24 @ 04:31)  T(F): , Max: 99.2 (04-18-24 @ 04:31)  HR: 104 (04-18-24 @ 04:31)  BP: 146/79 (04-18-24 @ 04:31)  BP(mean): 92 (04-17-24 @ 12:15)  RR: 18 (04-18-24 @ 04:31)  SpO2: 95% (04-18-24 @ 04:31)  Wt(kg): --      PHYSICAL EXAM:  Constitutional: NAD, Alert  HEENT: NCAT, MMM  Neck: Supple, No JVD  Respiratory: CTA-b/l  Cardiovascular: RRR s1s2, no m/r/g  Gastrointestinal: BS+, soft, NT/ND  Extremities: No peripheral edema b/l  Neurological: no focal deficits; strength grossly intact  Back: no CVAT b/l  Skin: No rashes, no nevi    LABS:                        10.0   10.38 )-----------( 350      ( 17 Apr 2024 06:46 )             31.0     Na(136)/K(4.4)/Cl(100)/HCO3(23)/BUN(41)/Cr(1.74)Glu(190)/Ca(9.4)/Mg(--)/PO4(--)    04-17 @ 06:48  Na(131)/K(5.2)/Cl(97)/HCO3(21)/BUN(45)/Cr(1.77)Glu(323)/Ca(8.9)/Mg(--)/PO4(--)    04-16 @ 21:53  Na(--)/K(--)/Cl(--)/HCO3(--)/BUN(--)/Cr(--)Glu(--)/Ca(--)/Mg(2.2)/PO4(--)    04-16 @ 05:44  Na(132)/K(5.4)/Cl(100)/HCO3(21)/BUN(47)/Cr(2.06)Glu(321)/Ca(9.3)/Mg(--)/PO4(--)    04-16 @ 02:40    CATH:   4/17  :S/P 1 elizabeth to distal LAD (95%) via RFA by Dr. Dominguez.      IMPRESSION: 69M w/ HTN, DM2, CAD, HFrEF, AFib, and CKD, 4/16/24 p/w SOB    (1)Renal - CKD3b - nonproteinuric; due to hypertension/atherosclerotic disease. No evidence to date of DENNIS s/p cath 4/17  (2)Hyperkalemia - improved  (3)Cards - SOB on admission - now s/p PCI to LAD    RECOMMEND:  (1)Advance diuretics to PO - Lasix 40mg po qd?  (2)Favor holding off for at least 1more day before adding back ARB given still in window during which DENNIS could declare itself - if for discharge today, would repeat labwork within 1 week, and would plan to reinstate ARB as outpatient - can f/u at my office on Mon 4/29/24 at 640p as previously scheduled      Markell Walton MD  [facility]  Office/on call physician: [phone]  Cell (7a-7l): [phone]       No pain, no sob  good urine output this a.m.      VITAL:  T(C): , Max: 37.3 (04-18-24 @ 04:31)  T(F): , Max: 99.2 (04-18-24 @ 04:31)  HR: 104 (04-18-24 @ 04:31)  BP: 146/79 (04-18-24 @ 04:31)  BP(mean): 92 (04-17-24 @ 12:15)  RR: 18 (04-18-24 @ 04:31)  SpO2: 95% (04-18-24 @ 04:31)  Wt(kg): --      PHYSICAL EXAM:  Constitutional: NAD, Alert  HEENT: NCAT, MMM  Neck: Supple, No JVD  Respiratory: CTA-b/l  Cardiovascular: RRR s1s2, no m/r/g  Gastrointestinal: BS+, soft, NT/ND  Extremities: No peripheral edema b/l  Neurological: no focal deficits; strength grossly intact  Back: no CVAT b/l  Skin: No rashes, no nevi    LABS:                        10.0   10.38 )-----------( 350      ( 17 Apr 2024 06:46 )             31.0     Na(136)/K(4.4)/Cl(100)/HCO3(23)/BUN(41)/Cr(1.74)Glu(190)/Ca(9.4)/Mg(--)/PO4(--)    04-17 @ 06:48  Na(131)/K(5.2)/Cl(97)/HCO3(21)/BUN(45)/Cr(1.77)Glu(323)/Ca(8.9)/Mg(--)/PO4(--)    04-16 @ 21:53  Na(--)/K(--)/Cl(--)/HCO3(--)/BUN(--)/Cr(--)Glu(--)/Ca(--)/Mg(2.2)/PO4(--)    04-16 @ 05:44  Na(132)/K(5.4)/Cl(100)/HCO3(21)/BUN(47)/Cr(2.06)Glu(321)/Ca(9.3)/Mg(--)/PO4(--)    04-16 @ 02:40    CATH:   4/17  :S/P 1 elizabeth to distal LAD (95%) via RFA by Dr. Dominguez.      IMPRESSION: 69M w/ HTN, DM2, CAD, HFrEF, AFib, and CKD, 4/16/24 p/w SOB    (1)Renal - CKD3b - nonproteinuric; due to hypertension/atherosclerotic disease. No evidence to date of DENNIS s/p cath 4/17  (2)Hyperkalemia - improved  (3)Cards - SOB on admission - now s/p PCI to LAD    RECOMMEND:  (1)Advance diuretics to PO - Lasix 40mg po qd?  (2)Favor holding off for at least 1more day before adding back ARB given still in window during which DENNIS could declare itself - if for discharge today, would repeat labwork within 1 week, and would plan to reinstate ARB as outpatient - can f/u at my office on Mon 4/29/24 at 640p as previously scheduled      Markell Walton MD  [facility]  Office/on call physician: [phone]  Cell (7a-7p): [phone]

## 2024-04-18 NOTE — PROGRESS NOTE ADULT - ASSESSMENT
68 y/o man  h/o severe CAD S/P multiple PCI's including multiple LM and LAD stents. +A-fib, HTN, HLD, T2DM, CKD3, former smoker presents with SOB and CP + troponin --> 594 s/p CATH yesterday with 2.0 mm caliber TITO to distal LAD by Dr ILDA Dominguez  - DAPT  - continue parenteral diuretics  - Max dose statin  - cont current diabetic regimen  - On IV heparin - consider change back to DOAC  - condition improved. Continue present care

## 2024-04-18 NOTE — CHART NOTE - NSCHARTNOTEFT_GEN_A_CORE
Pt s/p 1 elizabeth to distal LAD (95%) via RFA 4/17. Cath site examined; R groin w/o bleeding, bruising or s/s of hematoma formation. Femoral and pedal pulses 2+ bilaterally. Pt and vitals hemodynamically stable. Pt w/o pain at the site. Heparin drip resumed at previous rate (9 mL/hr) 6 hours after the sheath was removed. Will continue to monitor and reassess. Will endorse to day team in AM, attending to follow.    ICU Vital Signs Last 24 Hrs  T(C): 36.7 (17 Apr 2024 23:48), Max: 37 (17 Apr 2024 12:15)  T(F): 98.1 (17 Apr 2024 23:48), Max: 98.6 (17 Apr 2024 12:15)  HR: 94 (17 Apr 2024 23:48) (67 - 107)  BP: 131/78 (17 Apr 2024 23:48) (131/78 - 165/82)  BP(mean): 92 (17 Apr 2024 12:15) (92 - 92)  ABP: --  ABP(mean): --  RR: 17 (17 Apr 2024 23:48) (14 - 20)  SpO2: 97% (17 Apr 2024 23:48) (90% - 100%)    O2 Parameters below as of 17 Apr 2024 23:48  Patient On (Oxygen Delivery Method): room air    Ned Caro PA-C  Department of Medicine

## 2024-04-18 NOTE — PROGRESS NOTE ADULT - SUBJECTIVE AND OBJECTIVE BOX
Date of service: 24 @ 17:44      Patient is a 69y old  Male who presents with a chief complaint of Chest pain     (2024 14:42)                                                               INTERVAL HPI/OVERNIGHT EVENTS:    REVIEW OF SYSTEMS:     CONSTITUTIONAL: No weakness, fevers or chills  EYES/ENT: No visual changes , no ear ache   NECK: No pain or stiffness  RESPIRATORY: No cough, wheezing,  No shortness of breath  CARDIOVASCULAR: No chest pain or palpitations  GASTROINTESTINAL: No abdominal pain  . No nausea, vomiting, or hematemesis; No diarrhea or constipation. No melena or hematochezia.  GENITOURINARY: No dysuria, frequency or hematuria  NEUROLOGICAL: No numbness or weakness  SKIN: No itching, burning, rashes, or lesions                                                                                                                                                                                                                                                                                 Medications:  MEDICATIONS  (STANDING):  apixaban 2.5 milliGRAM(s) Oral every 12 hours  aspirin enteric coated 81 milliGRAM(s) Oral daily  atorvastatin 80 milliGRAM(s) Oral at bedtime  clopidogrel Tablet 75 milliGRAM(s) Oral daily  dapagliflozin 10 milliGRAM(s) Oral every 24 hours  dextrose 10% Bolus 125 milliLiter(s) IV Bolus once  dextrose 5%. 1000 milliLiter(s) (50 mL/Hr) IV Continuous <Continuous>  dextrose 5%. 1000 milliLiter(s) (100 mL/Hr) IV Continuous <Continuous>  dextrose 50% Injectable 25 Gram(s) IV Push once  dextrose 50% Injectable 12.5 Gram(s) IV Push once  furosemide   Injectable 20 milliGRAM(s) IV Push daily  glucagon  Injectable 1 milliGRAM(s) IntraMuscular once  influenza  Vaccine (HIGH DOSE) 0.7 milliLiter(s) IntraMuscular once  insulin glargine Injectable (LANTUS) 15 Unit(s) SubCutaneous at bedtime  insulin lispro (ADMELOG) corrective regimen sliding scale   SubCutaneous at bedtime  insulin lispro (ADMELOG) corrective regimen sliding scale   SubCutaneous three times a day before meals  insulin lispro Injectable (ADMELOG) 7 Unit(s) SubCutaneous three times a day before meals  isosorbide   dinitrate Tablet (ISORDIL) 30 milliGRAM(s) Oral three times a day  metoprolol tartrate 25 milliGRAM(s) Oral two times a day    MEDICATIONS  (PRN):  dextrose Oral Gel 15 Gram(s) Oral once PRN Blood Glucose LESS THAN 70 milliGRAM(s)/deciliter  nitroglycerin     SubLingual 0.4 milliGRAM(s) SubLingual every 5 minutes PRN Chest Pain       Allergies    No Known Allergies    Intolerances      Vital Signs Last 24 Hrs  T(C): 36.2 (2024 12:07), Max: 37.3 (2024 04:31)  T(F): 97.2 (2024 12:07), Max: 99.2 (2024 04:31)  HR: 94 (2024 12:07) (79 - 107)  BP: 142/78 (2024 12:07) (131/78 - 165/82)  BP(mean): 99 (2024 12:07) (99 - 99)  RR: 18 (2024 12:07) (14 - 20)  SpO2: 98% (2024 12:07) (90% - 98%)    Parameters below as of 2024 12:07  Patient On (Oxygen Delivery Method): room air      CAPILLARY BLOOD GLUCOSE      POCT Blood Glucose.: 341 mg/dL (2024 17:42)  POCT Blood Glucose.: 352 mg/dL (2024 17:40)  POCT Blood Glucose.: 367 mg/dL (2024 13:01)  POCT Blood Glucose.: 195 mg/dL (2024 08:43)  POCT Blood Glucose.: 193 mg/dL (2024 22:29)  POCT Blood Glucose.: 190 mg/dL (2024 18:29)       @ 07:  -  18 @ 07:00  --------------------------------------------------------  IN: 120 mL / OUT: 2250 mL / NET: -2130 mL     @ 07:01  -   @ 17:44  --------------------------------------------------------  IN: 0 mL / OUT: 1240 mL / NET: -1240 mL      Physical Exam:    Daily     Daily Weight in k (2024 04:31)  General:  Well appearing, NAD, not cachetic  HEENT:  Nonicteric, PERRLA  CV:  RRR, S1S2   Lungs:  CTA B/L, no wheezes, rales, rhonchi  Abdomen:  Soft, non-tender, no distended, positive BS  Extremities:  2+ pulses, no c/c, no edema  Skin:  Warm and dry, no rashes  :  No moreno  Neuro:  AAOx3, non-focal, grossly intact                                                                                                                                                                                                                                                                                                LABS:                               10.6   11.88 )-----------( 406      ( 2024 09:55 )             34.3                      04-18    134<L>  |  96  |  43<H>  ----------------------------<  202<H>  4.7   |  24  |  1.83<H>    Ca    10.0      2024 09:55    TPro  6.4  /  Alb  3.2<L>  /  TBili  1.5<H>  /  DBili  x   /  AST  109<H>  /  ALT  76<H>  /  AlkPhos  127<H>  04-17                       RADIOLOGY & ADDITIONAL TESTS         I personally reviewed: [  ]EKG   [  ]CXR    [  ] CT      A/P:         Discussed with :     Derek consultants' Notes   Time spent :

## 2024-04-18 NOTE — PROGRESS NOTE ADULT - SUBJECTIVE AND OBJECTIVE BOX
SUBJECTIVE:  No Cp this AM no SOB still with cough but less. He feels the IV Furosemide has helped     MEDICATIONS:  furosemide   Injectable 20 milliGRAM(s) IV Push daily  isosorbide   dinitrate Tablet (ISORDIL) 30 milliGRAM(s) Oral three times a day  metoprolol tartrate 25 milliGRAM(s) Oral two times a day  nitroglycerin     SubLingual 0.4 milliGRAM(s) SubLingual every 5 minutes PRN            atorvastatin 80 milliGRAM(s) Oral at bedtime  dapagliflozin 10 milliGRAM(s) Oral every 24 hours  dextrose 50% Injectable 25 Gram(s) IV Push once  dextrose 50% Injectable 12.5 Gram(s) IV Push once  dextrose Oral Gel 15 Gram(s) Oral once PRN  glucagon  Injectable 1 milliGRAM(s) IntraMuscular once  insulin glargine Injectable (LANTUS) 10 Unit(s) SubCutaneous at bedtime  insulin lispro (ADMELOG) corrective regimen sliding scale   SubCutaneous three times a day before meals  insulin lispro (ADMELOG) corrective regimen sliding scale   SubCutaneous at bedtime  insulin lispro Injectable (ADMELOG) 5 Unit(s) SubCutaneous three times a day before meals    aspirin enteric coated 81 milliGRAM(s) Oral daily  clopidogrel Tablet 75 milliGRAM(s) Oral daily  dextrose 10% Bolus 125 milliLiter(s) IV Bolus once  dextrose 5%. 1000 milliLiter(s) IV Continuous <Continuous>  dextrose 5%. 1000 milliLiter(s) IV Continuous <Continuous>  heparin   Injectable 5000 Unit(s) IV Push every 6 hours PRN  heparin   Injectable 2500 Unit(s) IV Push every 6 hours PRN  heparin  Infusion. 900 Unit(s)/Hr IV Continuous <Continuous>  influenza  Vaccine (HIGH DOSE) 0.7 milliLiter(s) IntraMuscular once      REVIEW OF SYSTEMS:    CONSTITUTIONAL: No fever, weight loss, or fatigue  EYES: No eye pain, visual disturbances, or discharge  NECK: No pain or stiffness  RESPIRATORY: + cough, NO wheezing, chills or hemoptysis; No Shortness of Breath  CARDIOVASCULAR: No chest pain, palpitations, dizziness, or leg swelling  GASTROINTESTINAL: No abdominal or epigastric pain. No nausea, vomiting, or hematemesis; No diarrhea or constipation. No melena or hematochezia.  GENITOURINARY: No dysuria, frequency, hematuria, or incontinence  NEUROLOGICAL: No headaches, memory loss, loss of strength, numbness, or tremors  SKIN: No itching, burning, rashes, or lesions   LYMPH Nodes: No enlarged glands  MUSCULOSKELETAL: No joint pain or swelling; No muscle, back, or extremity pain  All other review of systems are negative.  	  [ ] Unable to obtain    PHYSICAL EXAM:  T(C): 37.3 (04-18-24 @ 04:31), Max: 37.3 (04-18-24 @ 04:31)  HR: 104 (04-18-24 @ 04:31) (79 - 107)  BP: 146/79 (04-18-24 @ 04:31) (131/78 - 165/82)  RR: 18 (04-18-24 @ 04:31) (14 - 20)  SpO2: 95% (04-18-24 @ 04:31) (90% - 100%)  Wt(kg): --  I&O's Summary    17 Apr 2024 07:01  -  18 Apr 2024 07:00  --------------------------------------------------------  IN: 0 mL / OUT: 2250 mL / NET: -2250 mL          PHYSICAL EXAM    Appearance: Normal	  HEENT:   Normal oral mucosa, PERRL, EOMI	  NECK: Soft and supple, No LAD, No JVD  Cardiovascular: Regular Rate and Rhythm, Normal S1 S2, No murmurs, No clicks, gallops or rubs  Respiratory: Lungs clear to auscultation	  Gastrointestinal:  Soft, Non-tender, + BS	  Skin: No rashes, No ecchymoses, No cyanosis  Neurologic: Non-focal  Extremities: No clubbing, cyanosis or edema  Vascular: Peripheral pulses palpable 2+ bilaterally    TELEMETRY: 	  A kenny 80-95 - report obtained from telemetry service  	    LABS:	 	                          10.0   10.38 )-----------( 350      ( 17 Apr 2024 06:46 )             31.0     04-17    136  |  100  |  41<H>  ----------------------------<  190<H>  4.4   |  23  |  1.74<H>    Ca    9.4      17 Apr 2024 06:48    TPro  6.4  /  Alb  3.2<L>  /  TBili  1.5<H>  /  DBili  x   /  AST  109<H>  /  ALT  76<H>  /  AlkPhos  127<H>  04-17

## 2024-04-18 NOTE — DIETITIAN INITIAL EVALUATION ADULT - PERTINENT MEDS FT
MEDICATIONS  (STANDING):  aspirin enteric coated 81 milliGRAM(s) Oral daily  atorvastatin 80 milliGRAM(s) Oral at bedtime  clopidogrel Tablet 75 milliGRAM(s) Oral daily  dapagliflozin 10 milliGRAM(s) Oral every 24 hours  dextrose 10% Bolus 125 milliLiter(s) IV Bolus once  dextrose 5%. 1000 milliLiter(s) (50 mL/Hr) IV Continuous <Continuous>  dextrose 5%. 1000 milliLiter(s) (100 mL/Hr) IV Continuous <Continuous>  dextrose 50% Injectable 25 Gram(s) IV Push once  dextrose 50% Injectable 12.5 Gram(s) IV Push once  furosemide   Injectable 20 milliGRAM(s) IV Push daily  glucagon  Injectable 1 milliGRAM(s) IntraMuscular once  heparin  Infusion. 900 Unit(s)/Hr (9 mL/Hr) IV Continuous <Continuous>  influenza  Vaccine (HIGH DOSE) 0.7 milliLiter(s) IntraMuscular once  insulin glargine Injectable (LANTUS) 10 Unit(s) SubCutaneous at bedtime  insulin lispro (ADMELOG) corrective regimen sliding scale   SubCutaneous at bedtime  insulin lispro (ADMELOG) corrective regimen sliding scale   SubCutaneous three times a day before meals  insulin lispro Injectable (ADMELOG) 5 Unit(s) SubCutaneous three times a day before meals  isosorbide   dinitrate Tablet (ISORDIL) 30 milliGRAM(s) Oral three times a day  metoprolol tartrate 25 milliGRAM(s) Oral two times a day    MEDICATIONS  (PRN):  dextrose Oral Gel 15 Gram(s) Oral once PRN Blood Glucose LESS THAN 70 milliGRAM(s)/deciliter  heparin   Injectable 2500 Unit(s) IV Push every 6 hours PRN For aPTT between 40 - 57  heparin   Injectable 5000 Unit(s) IV Push every 6 hours PRN For aPTT less than 40  nitroglycerin     SubLingual 0.4 milliGRAM(s) SubLingual every 5 minutes PRN Chest Pain

## 2024-04-18 NOTE — PROGRESS NOTE ADULT - SUBJECTIVE AND OBJECTIVE BOX
Interventional Cardiology Post Cath Progress Note:                Subjective:   Patient feels well- no current complaints- Denies chest pain, shortness of breath. Denies pain, numbness, tingling or swelling around (groin) access site.    Tele 24hrs:      MEDICATIONS  (STANDING):  aspirin enteric coated 81 milliGRAM(s) Oral daily  atorvastatin 80 milliGRAM(s) Oral at bedtime  clopidogrel Tablet 75 milliGRAM(s) Oral daily  dapagliflozin 10 milliGRAM(s) Oral every 24 hours  dextrose 10% Bolus 125 milliLiter(s) IV Bolus once  dextrose 5%. 1000 milliLiter(s) (50 mL/Hr) IV Continuous <Continuous>  dextrose 5%. 1000 milliLiter(s) (100 mL/Hr) IV Continuous <Continuous>  dextrose 50% Injectable 25 Gram(s) IV Push once  dextrose 50% Injectable 12.5 Gram(s) IV Push once  furosemide   Injectable 20 milliGRAM(s) IV Push daily  glucagon  Injectable 1 milliGRAM(s) IntraMuscular once  heparin  Infusion. 900 Unit(s)/Hr (9 mL/Hr) IV Continuous <Continuous>  influenza  Vaccine (HIGH DOSE) 0.7 milliLiter(s) IntraMuscular once  insulin glargine Injectable (LANTUS) 10 Unit(s) SubCutaneous at bedtime  insulin lispro (ADMELOG) corrective regimen sliding scale   SubCutaneous at bedtime  insulin lispro (ADMELOG) corrective regimen sliding scale   SubCutaneous three times a day before meals  insulin lispro Injectable (ADMELOG) 5 Unit(s) SubCutaneous three times a day before meals  isosorbide   dinitrate Tablet (ISORDIL) 30 milliGRAM(s) Oral three times a day  metoprolol tartrate 25 milliGRAM(s) Oral two times a day    MEDICATIONS  (PRN):  dextrose Oral Gel 15 Gram(s) Oral once PRN Blood Glucose LESS THAN 70 milliGRAM(s)/deciliter  heparin   Injectable 2500 Unit(s) IV Push every 6 hours PRN For aPTT between 40 - 57  heparin   Injectable 5000 Unit(s) IV Push every 6 hours PRN For aPTT less than 40  nitroglycerin     SubLingual 0.4 milliGRAM(s) SubLingual every 5 minutes PRN Chest Pain      Objective:  Vital Signs Last 24 Hrs  T(C): 37.3 (18 Apr 2024 04:31), Max: 37.3 (18 Apr 2024 04:31)  T(F): 99.2 (18 Apr 2024 04:31), Max: 99.2 (18 Apr 2024 04:31)  HR: 104 (18 Apr 2024 04:31) (79 - 107)  BP: 146/79 (18 Apr 2024 04:31) (131/78 - 165/82)  BP(mean): 92 (17 Apr 2024 12:15) (92 - 92)  RR: 18 (18 Apr 2024 04:31) (14 - 20)  SpO2: 95% (18 Apr 2024 04:31) (90% - 100%)    Parameters below as of 18 Apr 2024 04:31  Patient On (Oxygen Delivery Method): room air        04-17-24 @ 07:01  -  04-18-24 @ 07:00  --------------------------------------------------------  IN: 0 mL / OUT: 2250 mL / NET: -2250 mL                              10.0   10.38 )-----------( 350      ( 17 Apr 2024 06:46 )             31.0     04-17    136  |  100  |  41<H>  ----------------------------<  190<H>  4.4   |  23  |  1.74<H>    Ca    9.4      17 Apr 2024 06:48    TPro  6.4  /  Alb  3.2<L>  /  TBili  1.5<H>  /  DBili  x   /  AST  109<H>  /  ALT  76<H>  /  AlkPhos  127<H>  04-17    PTT - ( 17 Apr 2024 09:33 )  PTT:85.0 sec  Urinalysis Basic - ( 17 Apr 2024 06:48 )    Color: x / Appearance: x / SG: x / pH: x  Gluc: 190 mg/dL / Ketone: x  / Bili: x / Urobili: x   Blood: x / Protein: x / Nitrite: x   Leuk Esterase: x / RBC: x / WBC x   Sq Epi: x / Non Sq Epi: x / Bacteria: x      Physical Exam:  No apparent distress, alert and oriented times three, appropriate affect  JVD is not elevated, supple  Clear to auscultation with no wheezing, ronchi or crackles  Regular rate and rhythm with no murmur, rub or gallop  Positive bowel sounds, soft, non-tender, non-distended, no masses/guarding or rebound tenderness  (Right) groin: Soft, non tender, no bleeding or hematoma, clean/dry/intact- RLE/LLE +2 (palpable femoral pulse/audible by doppler/absent)  No clubbing, cyanosis or edema                                                                                                 Interventional Cardiology Post Cath Progress Note:                Subjective:   Patient feels well- no current complaints- Denies chest pain, shortness of breath. Denies pain, numbness, tingling or swelling around (groin) access site.    Tele 24hrs: aflutter /m      MEDICATIONS  (STANDING):  aspirin enteric coated 81 milliGRAM(s) Oral daily  atorvastatin 80 milliGRAM(s) Oral at bedtime  clopidogrel Tablet 75 milliGRAM(s) Oral daily  dapagliflozin 10 milliGRAM(s) Oral every 24 hours  dextrose 10% Bolus 125 milliLiter(s) IV Bolus once  dextrose 5%. 1000 milliLiter(s) (50 mL/Hr) IV Continuous <Continuous>  dextrose 5%. 1000 milliLiter(s) (100 mL/Hr) IV Continuous <Continuous>  dextrose 50% Injectable 25 Gram(s) IV Push once  dextrose 50% Injectable 12.5 Gram(s) IV Push once  furosemide   Injectable 20 milliGRAM(s) IV Push daily  glucagon  Injectable 1 milliGRAM(s) IntraMuscular once  heparin  Infusion. 900 Unit(s)/Hr (9 mL/Hr) IV Continuous <Continuous>  influenza  Vaccine (HIGH DOSE) 0.7 milliLiter(s) IntraMuscular once  insulin glargine Injectable (LANTUS) 10 Unit(s) SubCutaneous at bedtime  insulin lispro (ADMELOG) corrective regimen sliding scale   SubCutaneous at bedtime  insulin lispro (ADMELOG) corrective regimen sliding scale   SubCutaneous three times a day before meals  insulin lispro Injectable (ADMELOG) 5 Unit(s) SubCutaneous three times a day before meals  isosorbide   dinitrate Tablet (ISORDIL) 30 milliGRAM(s) Oral three times a day  metoprolol tartrate 25 milliGRAM(s) Oral two times a day    MEDICATIONS  (PRN):  dextrose Oral Gel 15 Gram(s) Oral once PRN Blood Glucose LESS THAN 70 milliGRAM(s)/deciliter  heparin   Injectable 2500 Unit(s) IV Push every 6 hours PRN For aPTT between 40 - 57  heparin   Injectable 5000 Unit(s) IV Push every 6 hours PRN For aPTT less than 40  nitroglycerin     SubLingual 0.4 milliGRAM(s) SubLingual every 5 minutes PRN Chest Pain      Objective:  Vital Signs Last 24 Hrs  T(C): 37.3 (18 Apr 2024 04:31), Max: 37.3 (18 Apr 2024 04:31)  T(F): 99.2 (18 Apr 2024 04:31), Max: 99.2 (18 Apr 2024 04:31)  HR: 104 (18 Apr 2024 04:31) (79 - 107)  BP: 146/79 (18 Apr 2024 04:31) (131/78 - 165/82)  BP(mean): 92 (17 Apr 2024 12:15) (92 - 92)  RR: 18 (18 Apr 2024 04:31) (14 - 20)  SpO2: 95% (18 Apr 2024 04:31) (90% - 100%)    Parameters below as of 18 Apr 2024 04:31  Patient On (Oxygen Delivery Method): room air        04-17-24 @ 07:01  -  04-18-24 @ 07:00  --------------------------------------------------------  IN: 0 mL / OUT: 2250 mL / NET: -2250 mL                              10.0   10.38 )-----------( 350      ( 17 Apr 2024 06:46 )             31.0     04-17    136  |  100  |  41<H>  ----------------------------<  190<H>  4.4   |  23  |  1.74<H>    Ca    9.4      17 Apr 2024 06:48    TPro  6.4  /  Alb  3.2<L>  /  TBili  1.5<H>  /  DBili  x   /  AST  109<H>  /  ALT  76<H>  /  AlkPhos  127<H>  04-17    PTT - ( 17 Apr 2024 09:33 )  PTT:85.0 sec  Urinalysis Basic - ( 17 Apr 2024 06:48 )    Color: x / Appearance: x / SG: x / pH: x  Gluc: 190 mg/dL / Ketone: x  / Bili: x / Urobili: x   Blood: x / Protein: x / Nitrite: x   Leuk Esterase: x / RBC: x / WBC x   Sq Epi: x / Non Sq Epi: x / Bacteria: x      Physical Exam:  No apparent distress, alert and oriented times three, appropriate affect  JVD is not elevated, supple  Clear to auscultation with no wheezing, ronchi or crackles  Regular rate and rhythm with no murmur, rub or gallop  Positive bowel sounds, soft, non-tender, non-distended, no masses/guarding or rebound tenderness  (Right) groin: Soft, non tender, no bleeding or hematoma, clean/dry/intact- RLE/LLE +2 (palpable femoral pulse/audible by doppler/absent)  No clubbing, cyanosis or edema

## 2024-04-18 NOTE — DIETITIAN INITIAL EVALUATION ADULT - PERSON TAUGHT/METHOD
Provided education on heart failure nutrition therapy. Recommended limited salt intake. Reviewed foods high in salt and amount of salt recommended per day. Discussed nutrition label reading. Stressed the importance of limited fried foods and saturated fat consumption. Reviewed recommended foods within therapeutic diet. Discussed the importance of daily weights at home. Discussed weight gain parameters for contacting MD.   Provided education on T2DM nutrition therapy. Provided education on foods containing carbohydrates, foods containing proteins, and portion sizes. Stressed the importance of a balanced meal to maintain blood glucose. Encouraged consumption of non-starchy vegetables and other fiber-rich foods. Encouraged pt to monitor blood glucose at home. Recommended water consumption with avoidance of soda and juice. Pt made aware RD remains available./verbal instruction/written material/patient instructed

## 2024-04-18 NOTE — DIETITIAN INITIAL EVALUATION ADULT - NSFNSGIASSESSMENTFT_GEN_A_CORE
Denies N/V and diarrhea/constipation. Last BM 4/12 per pt, noting this is normal for him, however noted with BM 4/17 per chart. Not currently ordered for a bowel regimen.

## 2024-04-18 NOTE — DIETITIAN INITIAL EVALUATION ADULT - ADD RECOMMEND
Monitor nutritional intake, diet tolerance, labs, hydration, GI function, skin integrity and wt trends

## 2024-04-18 NOTE — DIETITIAN INITIAL EVALUATION ADULT - NS FNS DIET ORDER
Diet, Regular:   Consistent Carbohydrate {No Snacks} (CSTCHO)  DASH/TLC {Sodium & Cholesterol Restricted} (DASH)  No Concentrated Potassium  Low Sodium (04-17-24 @ 07:59) [Active]

## 2024-04-18 NOTE — DIETITIAN INITIAL EVALUATION ADULT - ORAL INTAKE PTA/DIET HISTORY
**Known to RD service, recent diagnosis of moderate acute malnutrition (2/13)  PTA per pt  -Intake: Pt reporting good typical intake, eating 3 meals/day at home. Tries to limit sugar at home, endorses eating salty foods  -Chewing/Swallowing: denies hx of difficulty   -Allergies/Intolerances: Confirms NKFA   -Vitamins/Supplements: multivitamin per home medications

## 2024-04-18 NOTE — DIETITIAN INITIAL EVALUATION ADULT - PERTINENT LABORATORY DATA
04-18    134<L>  |  96  |  43<H>  ----------------------------<  202<H>  4.7   |  24  |  1.83<H>    Ca    10.0      18 Apr 2024 09:55    TPro  6.4  /  Alb  3.2<L>  /  TBili  1.5<H>  /  DBili  x   /  AST  109<H>  /  ALT  76<H>  /  AlkPhos  127<H>  04-17  POCT Blood Glucose.: 367 mg/dL (04-18-24 @ 13:01)  A1C with Estimated Average Glucose Result: 7.6 % (04-17-24 @ 06:46)  A1C with Estimated Average Glucose Result: 7.7 % (04-16-24 @ 03:52)  A1C with Estimated Average Glucose Result: 6.7 % (01-29-24 @ 02:59)

## 2024-04-18 NOTE — DIETITIAN INITIAL EVALUATION ADULT - NSFNSADHERENCEPTAFT_GEN_A_CORE
Pt confirms hx of T2DM. Home medications include farxiga and semglee at night per pt. Endorses taking fingersticks 1x/day at bedtime with a wide range, often between 150-299mg/dL.   Latest A1C 7.6% (4/17) indicates suboptimal glycemic control. POCTs to 450s in-house 4/16, pt reporting he becomes concerned when they're that high. Ordered for 10U lantus, 5U admelog preprandially, farxiga, and sliding scale insulin.

## 2024-04-18 NOTE — PROGRESS NOTE ADULT - SUBJECTIVE AND OBJECTIVE BOX
Chief complaint  Patient is a 69y old  Male who presents with a chief complaint of CP / SOB (18 Apr 2024 07:28)         Labs and Fingersticks  CAPILLARY BLOOD GLUCOSE      POCT Blood Glucose.: 367 mg/dL (18 Apr 2024 13:01)  POCT Blood Glucose.: 195 mg/dL (18 Apr 2024 08:43)  POCT Blood Glucose.: 193 mg/dL (17 Apr 2024 22:29)  POCT Blood Glucose.: 190 mg/dL (17 Apr 2024 18:29)      Anion Gap: 14 (04-18 @ 09:55)  Anion Gap: 13 (04-17 @ 06:48)  Anion Gap: 13 (04-16 @ 21:53)      Calcium: 10.0 (04-18 @ 09:55)  Calcium: 9.4 (04-17 @ 06:48)  Calcium: 8.9 (04-16 @ 21:53)  Albumin: 3.2 *L* (04-17 @ 06:48)    Alanine Aminotransferase (ALT/SGPT): 76 *H* (04-17 @ 06:48)  Alkaline Phosphatase: 127 *H* (04-17 @ 06:48)  Aspartate Aminotransferase (AST/SGOT): 109 *H* (04-17 @ 06:48)        04-18    134<L>  |  96  |  43<H>  ----------------------------<  202<H>  4.7   |  24  |  1.83<H>    Ca    10.0      18 Apr 2024 09:55    TPro  6.4  /  Alb  3.2<L>  /  TBili  1.5<H>  /  DBili  x   /  AST  109<H>  /  ALT  76<H>  /  AlkPhos  127<H>  04-17                        10.6   11.88 )-----------( 406      ( 18 Apr 2024 09:55 )             34.3     Medications  MEDICATIONS  (STANDING):  aspirin enteric coated 81 milliGRAM(s) Oral daily  atorvastatin 80 milliGRAM(s) Oral at bedtime  clopidogrel Tablet 75 milliGRAM(s) Oral daily  dapagliflozin 10 milliGRAM(s) Oral every 24 hours  dextrose 10% Bolus 125 milliLiter(s) IV Bolus once  dextrose 5%. 1000 milliLiter(s) (50 mL/Hr) IV Continuous <Continuous>  dextrose 5%. 1000 milliLiter(s) (100 mL/Hr) IV Continuous <Continuous>  dextrose 50% Injectable 25 Gram(s) IV Push once  dextrose 50% Injectable 12.5 Gram(s) IV Push once  furosemide   Injectable 20 milliGRAM(s) IV Push daily  glucagon  Injectable 1 milliGRAM(s) IntraMuscular once  heparin  Infusion. 900 Unit(s)/Hr (9 mL/Hr) IV Continuous <Continuous>  influenza  Vaccine (HIGH DOSE) 0.7 milliLiter(s) IntraMuscular once  insulin glargine Injectable (LANTUS) 15 Unit(s) SubCutaneous at bedtime  insulin lispro (ADMELOG) corrective regimen sliding scale   SubCutaneous three times a day before meals  insulin lispro (ADMELOG) corrective regimen sliding scale   SubCutaneous at bedtime  insulin lispro Injectable (ADMELOG) 7 Unit(s) SubCutaneous three times a day before meals  isosorbide   dinitrate Tablet (ISORDIL) 30 milliGRAM(s) Oral three times a day  metoprolol tartrate 25 milliGRAM(s) Oral two times a day      Physical Exam  General: Patient comfortable in bed   Vital Signs Last 12 Hrs  T(F): 97.2 (04-18-24 @ 12:07), Max: 99.2 (04-18-24 @ 04:31)  HR: 94 (04-18-24 @ 12:07) (94 - 104)  BP: 142/78 (04-18-24 @ 12:07) (142/78 - 146/79)  BP(mean): 99 (04-18-24 @ 12:07) (99 - 99)  RR: 18 (04-18-24 @ 12:07) (18 - 18)  SpO2: 98% (04-18-24 @ 12:07) (95% - 98%)    CVS: S1S2   Respiratory: No wheezing, no crepitations  GI: Abdomen soft, bowel sounds positive  Musculoskeletal:  moves all extremities         Chief complaint  Patient is a 69y old  Male who presents with a chief complaint of CP / SOB (18 Apr 2024 07:28)     Labs and Fingersticks  CAPILLARY BLOOD GLUCOSE      POCT Blood Glucose.: 367 mg/dL (18 Apr 2024 13:01)  POCT Blood Glucose.: 195 mg/dL (18 Apr 2024 08:43)  POCT Blood Glucose.: 193 mg/dL (17 Apr 2024 22:29)  POCT Blood Glucose.: 190 mg/dL (17 Apr 2024 18:29)      Anion Gap: 14 (04-18 @ 09:55)  Anion Gap: 13 (04-17 @ 06:48)  Anion Gap: 13 (04-16 @ 21:53)      Calcium: 10.0 (04-18 @ 09:55)  Calcium: 9.4 (04-17 @ 06:48)  Calcium: 8.9 (04-16 @ 21:53)  Albumin: 3.2 *L* (04-17 @ 06:48)    Alanine Aminotransferase (ALT/SGPT): 76 *H* (04-17 @ 06:48)  Alkaline Phosphatase: 127 *H* (04-17 @ 06:48)  Aspartate Aminotransferase (AST/SGOT): 109 *H* (04-17 @ 06:48)        04-18    134<L>  |  96  |  43<H>  ----------------------------<  202<H>  4.7   |  24  |  1.83<H>    Ca    10.0      18 Apr 2024 09:55    TPro  6.4  /  Alb  3.2<L>  /  TBili  1.5<H>  /  DBili  x   /  AST  109<H>  /  ALT  76<H>  /  AlkPhos  127<H>  04-17                        10.6   11.88 )-----------( 406      ( 18 Apr 2024 09:55 )             34.3     Medications  MEDICATIONS  (STANDING):  aspirin enteric coated 81 milliGRAM(s) Oral daily  atorvastatin 80 milliGRAM(s) Oral at bedtime  clopidogrel Tablet 75 milliGRAM(s) Oral daily  dapagliflozin 10 milliGRAM(s) Oral every 24 hours  dextrose 10% Bolus 125 milliLiter(s) IV Bolus once  dextrose 5%. 1000 milliLiter(s) (50 mL/Hr) IV Continuous <Continuous>  dextrose 5%. 1000 milliLiter(s) (100 mL/Hr) IV Continuous <Continuous>  dextrose 50% Injectable 25 Gram(s) IV Push once  dextrose 50% Injectable 12.5 Gram(s) IV Push once  furosemide   Injectable 20 milliGRAM(s) IV Push daily  glucagon  Injectable 1 milliGRAM(s) IntraMuscular once  heparin  Infusion. 900 Unit(s)/Hr (9 mL/Hr) IV Continuous <Continuous>  influenza  Vaccine (HIGH DOSE) 0.7 milliLiter(s) IntraMuscular once  insulin glargine Injectable (LANTUS) 15 Unit(s) SubCutaneous at bedtime  insulin lispro (ADMELOG) corrective regimen sliding scale   SubCutaneous three times a day before meals  insulin lispro (ADMELOG) corrective regimen sliding scale   SubCutaneous at bedtime  insulin lispro Injectable (ADMELOG) 7 Unit(s) SubCutaneous three times a day before meals  isosorbide   dinitrate Tablet (ISORDIL) 30 milliGRAM(s) Oral three times a day  metoprolol tartrate 25 milliGRAM(s) Oral two times a day      Physical Exam  General: Patient comfortable in bed   Vital Signs Last 12 Hrs  T(F): 97.2 (04-18-24 @ 12:07), Max: 99.2 (04-18-24 @ 04:31)  HR: 94 (04-18-24 @ 12:07) (94 - 104)  BP: 142/78 (04-18-24 @ 12:07) (142/78 - 146/79)  BP(mean): 99 (04-18-24 @ 12:07) (99 - 99)  RR: 18 (04-18-24 @ 12:07) (18 - 18)  SpO2: 98% (04-18-24 @ 12:07) (95% - 98%)    CVS: S1S2   Respiratory: No wheezing, no crepitations  GI: Abdomen soft, bowel sounds positive  Musculoskeletal:  moves all extremities

## 2024-04-18 NOTE — DIETITIAN INITIAL EVALUATION ADULT - LITERATURE/VIDEOS GIVEN
Carbohydrate Counting for People with Diabetes  Plate Method for Diabetes  Heart Failure Nutrition Therapy  Sodium Content of Foods

## 2024-04-18 NOTE — DIETITIAN INITIAL EVALUATION ADULT - NS FNS REASON FOR WEIGHT CHANG
Pt reporting UBW of ~140 pounds and endorses wt loss/fluctuations in setting of fluid shifts.    Current dosing wt 140 pounds   Daily wt hx per chart in pounds: 132.2 (4/18), 135.5 (4/17)  Wt hx per [facility] HIE in pounds: 131 (2/14, 2/8), 140 (1/28, 11/27/23)    Pt noted with fairly consistent wts x6 months, fluctuating between 130-140 pounds likely in setting of HF. RD to continue to monitor wt as able   IBW: 142 pounds

## 2024-04-18 NOTE — PROGRESS NOTE ADULT - ASSESSMENT
68 y/o male with hx of CAD s/p multiple  stents (most recent to pLM (70%), pLAD, mLAD on 1/8/24 with Dr. Dominguez), HFrEF , A-fib, HTN, HLD, T2DM,  CKD3, former smoker  Assessment  DMT2: 69y Male with DM T2 with hyperglycemia, A1C 7.6% , was on oral meds and insulin at home (Farxiga and Semglee basal insulin), now on basal  bolus insulins. Glucose trending high   CAD: on medications, stable, monitored.   HTN: on antihypertensive medications, monitored, asymptomatic.  CKD: Monitor labs/BMP, renal follow up      Discussed plan and management with Dr Flo Jamil NP - TEAMS  Myrna Rossi MD  Cell: [phone]  Office: [phone]              68 y/o male with hx of CAD s/p multiple  stents (most recent to pLM (70%), pLAD, mLAD on 1/8/24 with Dr. Dominguez), HFrEF , A-fib, HTN, HLD, T2DM,  CKD3, former smoker  Assessment  DMT2: 69y Male with DM T2 with hyperglycemia, A1C 7.6% , was on oral meds and insulin at home (Farxiga and Semglee basal insulin), now on basal  bolus insulins. Glucose  trending high   CAD: on medications, stable, monitored.   HTN: on antihypertensive medications, monitored, asymptomatic.  CKD: Monitor labs/BMP, renal follow up      Discussed plan and management with Dr Flo Jamil NP - TEAMS  Myrna Rossi MD  Cell: [phone]  Office: [phone]

## 2024-04-19 ENCOUNTER — TRANSCRIPTION ENCOUNTER (OUTPATIENT)
Age: 69
End: 2024-04-19

## 2024-04-19 VITALS
DIASTOLIC BLOOD PRESSURE: 81 MMHG | TEMPERATURE: 98 F | HEART RATE: 85 BPM | OXYGEN SATURATION: 96 % | RESPIRATION RATE: 18 BRPM | SYSTOLIC BLOOD PRESSURE: 127 MMHG

## 2024-04-19 LAB
ANION GAP SERPL CALC-SCNC: 13 MMOL/L — SIGNIFICANT CHANGE UP (ref 5–17)
BUN SERPL-MCNC: 50 MG/DL — HIGH (ref 7–23)
CALCIUM SERPL-MCNC: 9.8 MG/DL — SIGNIFICANT CHANGE UP (ref 8.4–10.5)
CHLORIDE SERPL-SCNC: 100 MMOL/L — SIGNIFICANT CHANGE UP (ref 96–108)
CO2 SERPL-SCNC: 23 MMOL/L — SIGNIFICANT CHANGE UP (ref 22–31)
CREAT SERPL-MCNC: 1.89 MG/DL — HIGH (ref 0.5–1.3)
EGFR: 38 ML/MIN/1.73M2 — LOW
GLUCOSE BLDC GLUCOMTR-MCNC: 144 MG/DL — HIGH (ref 70–99)
GLUCOSE BLDC GLUCOMTR-MCNC: 270 MG/DL — HIGH (ref 70–99)
GLUCOSE SERPL-MCNC: 140 MG/DL — HIGH (ref 70–99)
HCT VFR BLD CALC: 33.3 % — LOW (ref 39–50)
HGB BLD-MCNC: 10.6 G/DL — LOW (ref 13–17)
MCHC RBC-ENTMCNC: 27.9 PG — SIGNIFICANT CHANGE UP (ref 27–34)
MCHC RBC-ENTMCNC: 31.8 GM/DL — LOW (ref 32–36)
MCV RBC AUTO: 87.6 FL — SIGNIFICANT CHANGE UP (ref 80–100)
NRBC # BLD: 0 /100 WBCS — SIGNIFICANT CHANGE UP (ref 0–0)
PLATELET # BLD AUTO: 367 K/UL — SIGNIFICANT CHANGE UP (ref 150–400)
POTASSIUM SERPL-MCNC: 4.6 MMOL/L — SIGNIFICANT CHANGE UP (ref 3.5–5.3)
POTASSIUM SERPL-SCNC: 4.6 MMOL/L — SIGNIFICANT CHANGE UP (ref 3.5–5.3)
RBC # BLD: 3.8 M/UL — LOW (ref 4.2–5.8)
RBC # FLD: 15.3 % — HIGH (ref 10.3–14.5)
SODIUM SERPL-SCNC: 136 MMOL/L — SIGNIFICANT CHANGE UP (ref 135–145)
WBC # BLD: 10.72 K/UL — HIGH (ref 3.8–10.5)
WBC # FLD AUTO: 10.72 K/UL — HIGH (ref 3.8–10.5)

## 2024-04-19 PROCEDURE — 85025 COMPLETE CBC W/AUTO DIFF WBC: CPT

## 2024-04-19 PROCEDURE — 71045 X-RAY EXAM CHEST 1 VIEW: CPT

## 2024-04-19 PROCEDURE — C1725: CPT

## 2024-04-19 PROCEDURE — 85027 COMPLETE CBC AUTOMATED: CPT

## 2024-04-19 PROCEDURE — 83605 ASSAY OF LACTIC ACID: CPT

## 2024-04-19 PROCEDURE — 82010 KETONE BODYS QUAN: CPT

## 2024-04-19 PROCEDURE — 96374 THER/PROPH/DIAG INJ IV PUSH: CPT

## 2024-04-19 PROCEDURE — 99285 EMERGENCY DEPT VISIT HI MDM: CPT | Mod: 25

## 2024-04-19 PROCEDURE — 83036 HEMOGLOBIN GLYCOSYLATED A1C: CPT

## 2024-04-19 PROCEDURE — C1894: CPT

## 2024-04-19 PROCEDURE — 82330 ASSAY OF CALCIUM: CPT

## 2024-04-19 PROCEDURE — 82435 ASSAY OF BLOOD CHLORIDE: CPT

## 2024-04-19 PROCEDURE — 93005 ELECTROCARDIOGRAM TRACING: CPT

## 2024-04-19 PROCEDURE — 83735 ASSAY OF MAGNESIUM: CPT

## 2024-04-19 PROCEDURE — 85730 THROMBOPLASTIN TIME PARTIAL: CPT

## 2024-04-19 PROCEDURE — 82803 BLOOD GASES ANY COMBINATION: CPT

## 2024-04-19 PROCEDURE — 93454 CORONARY ARTERY ANGIO S&I: CPT | Mod: 59

## 2024-04-19 PROCEDURE — 93970 EXTREMITY STUDY: CPT

## 2024-04-19 PROCEDURE — A9540: CPT

## 2024-04-19 PROCEDURE — 84132 ASSAY OF SERUM POTASSIUM: CPT

## 2024-04-19 PROCEDURE — 85018 HEMOGLOBIN: CPT

## 2024-04-19 PROCEDURE — 78582 LUNG VENTILAT&PERFUS IMAGING: CPT | Mod: MC

## 2024-04-19 PROCEDURE — 85014 HEMATOCRIT: CPT

## 2024-04-19 PROCEDURE — 84295 ASSAY OF SERUM SODIUM: CPT

## 2024-04-19 PROCEDURE — A9567: CPT

## 2024-04-19 PROCEDURE — C9600: CPT | Mod: LD

## 2024-04-19 PROCEDURE — 99232 SBSQ HOSP IP/OBS MODERATE 35: CPT

## 2024-04-19 PROCEDURE — 36415 COLL VENOUS BLD VENIPUNCTURE: CPT

## 2024-04-19 PROCEDURE — 82962 GLUCOSE BLOOD TEST: CPT

## 2024-04-19 PROCEDURE — C1887: CPT

## 2024-04-19 PROCEDURE — 87637 SARSCOV2&INF A&B&RSV AMP PRB: CPT

## 2024-04-19 PROCEDURE — C1874: CPT

## 2024-04-19 PROCEDURE — 80048 BASIC METABOLIC PNL TOTAL CA: CPT

## 2024-04-19 PROCEDURE — 84484 ASSAY OF TROPONIN QUANT: CPT

## 2024-04-19 PROCEDURE — 83880 ASSAY OF NATRIURETIC PEPTIDE: CPT

## 2024-04-19 PROCEDURE — 82947 ASSAY GLUCOSE BLOOD QUANT: CPT

## 2024-04-19 PROCEDURE — C1769: CPT

## 2024-04-19 PROCEDURE — 80053 COMPREHEN METABOLIC PANEL: CPT

## 2024-04-19 RX ORDER — ASPIRIN/CALCIUM CARB/MAGNESIUM 324 MG
1 TABLET ORAL
Qty: 7 | Refills: 0
Start: 2024-04-19 | End: 2024-04-25

## 2024-04-19 RX ORDER — ISOSORBIDE DINITRATE 5 MG/1
1 TABLET ORAL
Qty: 90 | Refills: 0
Start: 2024-04-19 | End: 2024-05-18

## 2024-04-19 RX ADMIN — Medication 7 UNIT(S): at 12:50

## 2024-04-19 RX ADMIN — ISOSORBIDE DINITRATE 30 MILLIGRAM(S): 5 TABLET ORAL at 06:10

## 2024-04-19 RX ADMIN — DAPAGLIFLOZIN 10 MILLIGRAM(S): 10 TABLET, FILM COATED ORAL at 16:44

## 2024-04-19 RX ADMIN — ISOSORBIDE DINITRATE 30 MILLIGRAM(S): 5 TABLET ORAL at 12:02

## 2024-04-19 RX ADMIN — Medication 81 MILLIGRAM(S): at 12:01

## 2024-04-19 RX ADMIN — CLOPIDOGREL BISULFATE 75 MILLIGRAM(S): 75 TABLET, FILM COATED ORAL at 12:02

## 2024-04-19 RX ADMIN — Medication 3: at 12:50

## 2024-04-19 RX ADMIN — APIXABAN 2.5 MILLIGRAM(S): 2.5 TABLET, FILM COATED ORAL at 08:55

## 2024-04-19 RX ADMIN — Medication 7 UNIT(S): at 09:08

## 2024-04-19 RX ADMIN — Medication 25 MILLIGRAM(S): at 06:11

## 2024-04-19 RX ADMIN — Medication 20 MILLIGRAM(S): at 06:12

## 2024-04-19 NOTE — DISCHARGE NOTE NURSING/CASE MANAGEMENT/SOCIAL WORK - PATIENT PORTAL LINK FT
You can access the FollowMyHealth Patient Portal offered by [facility] by registering at the following website: [email or web address]. By joining [employer]’s FollowMyHealth portal, you will also be able to view your health information using other applications (apps) compatible with our system. ---

## 2024-04-19 NOTE — PROGRESS NOTE ADULT - ASSESSMENT
70 y/o male with hx of CAD s/p multiple  stents (most recent to pLM (70%), pLAD, mLAD on 1/8/24 with Dr. Dominguez), HFrEF , A-fib, HTN, HLD, T2DM,  CKD3, former smoker  Assessment  DMT2: 69y Male with DM T2 with hyperglycemia, A1C 7.6% , was on oral meds and insulin at home (Farxiga and Semglee basal insulin), now on basal  bolus insulins, sugars improving.  CAD: on medications, stable, monitored.   HTN: on antihypertensive medications, monitored, asymptomatic.  CKD: Monitor labs/BMP, renal follow up    Myrna Rossi MD  Cell: [phone]  Office: [phone]

## 2024-04-19 NOTE — PROGRESS NOTE ADULT - PROBLEM SELECTOR PLAN 1
Will continue current insulin regimen for now. Will continue monitoring  blood sugars, will Follow up.  Patient counseled for compliance with consistent low carb diet.  .   DC plan   Home Semglee current inpt dose  Farxiga home dose  FU outpatient
Will increase Lantus to 15 units at bed time.  Will increase Admelog to 7 units before each meal in addition to Admelog correction scale coverage.  Will continue monitoring FS, log, and glucose trends, will Follow up.  Patient counseled for compliance with consistent low carb diet and exercise as tolerated outpatient.   DC plan   Home Semglee current inpt dose  Farxiga home dose  FU outpatient

## 2024-04-19 NOTE — DISCHARGE NOTE PROVIDER - CARE PROVIDERS DIRECT ADDRESSES
[email or web address]

## 2024-04-19 NOTE — PROGRESS NOTE ADULT - ASSESSMENT
68 y/o male with hx of CAD s/p multiple  stents (most recent to pLM (70%), pLAD, mLAD on 1/8/24 with Dr. Dominguez), HFrEF (EF~50% as of 11/26/23), A-fib, HTN, HLD, T2DM, CKD3, former smoke a/w worsening sob.  Pt reports that he had ran out of eliquis  few  weeks ago and restarted ( was off approximately two weeks )   around the same time pt was c\having , cough , chills and dyspnea : was given a course of abx however wiht no imporvment ..  his Dysnpea worsened to a point where it was evident on minimal exertion.  he still gets exertional CP however not as servere and does not need nitro SL as often   no orhtopnea  or PND   not sure if worsening edema   no fever or chills over the past few days  no N/V/D/urinary sx       Dyspnea:   check VA duplex r/o dvt and Vq  r/o PE given pt was off eliquis for over a week : bnoth of which are negative   no pna no cxr   consider ct chest r/o pna   however no fever and no elevated WBC       hx of CHF :  with clear lungs and worsening edema of LE   iv diuretics for acute on chronic CHF Diastolic : change to po      DM: uncontrolled   insulin and consulted endo : improved FS     Afib : changed to eleiquis   cont rate control      CKD stable       NSTEMI  : reports that his CP is on exertion, seems to be requiring less nitro   cath noted : stent placed     FULLL CODE

## 2024-04-19 NOTE — DISCHARGE NOTE PROVIDER - PROVIDER TOKENS
PROVIDER:[TOKEN:[[id number]],FOLLOWUP:[1 week]],PROVIDER:[TOKEN:[[id number]],FOLLOWUP:[1 week]],PROVIDER:[TOKEN:[[id number]],FOLLOWUP:[1 week]],PROVIDER:[TOKEN:[[id number]],FOLLOWUP:[1 week]]

## 2024-04-19 NOTE — PROGRESS NOTE ADULT - SUBJECTIVE AND OBJECTIVE BOX
[facility] INVASIVE CARDIOLOGY -- Warren Mckay Kaplan, Meraj, Faisal, Joshua, Bruce, Navdeep Alvarado Johnston-CESILIA Willis Chawla  Cardiac cath/EP lab - [facility] Team  [phone]     Interventional Cardiology Post Cath Progress Note:              Subjective: No acute events overnight. Patient seen and assessed at bedside. Endorses feeling ____. Denies chest pain, palpitations, SOB, PND/orthopnea, leg pain/edema, pain, numbness/tingling/swelling around groin/wrist access site.    Tele 24hrs:    MEDICATIONS  (STANDING):  apixaban 2.5 milliGRAM(s) Oral every 12 hours  aspirin enteric coated 81 milliGRAM(s) Oral daily  atorvastatin 80 milliGRAM(s) Oral at bedtime  clopidogrel Tablet 75 milliGRAM(s) Oral daily  dapagliflozin 10 milliGRAM(s) Oral every 24 hours  dextrose 10% Bolus 125 milliLiter(s) IV Bolus once  dextrose 5%. 1000 milliLiter(s) (50 mL/Hr) IV Continuous <Continuous>  dextrose 5%. 1000 milliLiter(s) (100 mL/Hr) IV Continuous <Continuous>  dextrose 50% Injectable 12.5 Gram(s) IV Push once  dextrose 50% Injectable 25 Gram(s) IV Push once  furosemide   Injectable 20 milliGRAM(s) IV Push daily  glucagon  Injectable 1 milliGRAM(s) IntraMuscular once  influenza  Vaccine (HIGH DOSE) 0.7 milliLiter(s) IntraMuscular once  insulin glargine Injectable (LANTUS) 15 Unit(s) SubCutaneous at bedtime  insulin lispro (ADMELOG) corrective regimen sliding scale   SubCutaneous at bedtime  insulin lispro (ADMELOG) corrective regimen sliding scale   SubCutaneous three times a day before meals  insulin lispro Injectable (ADMELOG) 7 Unit(s) SubCutaneous three times a day before meals  isosorbide   dinitrate Tablet (ISORDIL) 30 milliGRAM(s) Oral three times a day  metoprolol tartrate 25 milliGRAM(s) Oral two times a day    MEDICATIONS  (PRN):  dextrose Oral Gel 15 Gram(s) Oral once PRN Blood Glucose LESS THAN 70 milliGRAM(s)/deciliter  nitroglycerin     SubLingual 0.4 milliGRAM(s) SubLingual every 5 minutes PRN Chest Pain      Vital Signs Last 24 Hrs  T(C): 36.4 (19 Apr 2024 04:28), Max: 36.7 (18 Apr 2024 18:13)  T(F): 97.6 (19 Apr 2024 04:28), Max: 98 (18 Apr 2024 18:13)  HR: 76 (19 Apr 2024 04:28) (76 - 94)  BP: 138/72 (19 Apr 2024 04:28) (119/67 - 162/63)  BP(mean): 96 (18 Apr 2024 18:15) (96 - 99)  RR: 18 (19 Apr 2024 04:28) (18 - 18)  SpO2: 94% (19 Apr 2024 04:28) (94% - 98%)    Parameters below as of 18 Apr 2024 18:15  Patient On (Oxygen Delivery Method): room air        Intake and output:    04-17-24 @ 07:01  -  04-18-24 @ 07:00  --------------------------------------------------------  IN: 120 mL / OUT: 2250 mL / NET: -2130 mL    04-18-24 @ 07:01  -  04-19-24 @ 06:47  --------------------------------------------------------  IN: 808 mL / OUT: 1980 mL / NET: -1172 mL        PHYSICAL EXAM:  General: Awake and alert.  No acute distress.  Neck: Supple.  Full range of motion.  No JVD.  No LAD.  No thyromegaly.  Trachea midline.  2+ carotid pulses bilaterally, no carotid bruit.  Cardiovascular: Normal S1 S2, No JVD, No murmurs, rubs, gallops or clicks  Respiratory: Nonlabored breathing, good inspiratory effort. Lungs clear to auscultation/Decreased Breath Sounds/No Rales, Rhonchi, Wheezing	  Vascular: (groin/radial)  stable; soft, no bleeding or hematoma, no bruit, + pulses     Labs:                        10.6   11.88 )-----------( 406      ( 18 Apr 2024 09:55 )             34.3     04-18    134<L>  |  96  |  43<H>  ----------------------------<  202<H>  4.7   |  24  |  1.83<H>    Ca    10.0      18 Apr 2024 09:55    TPro  6.4  /  Alb  3.2<L>  /  TBili  1.5<H>  /  DBili  x   /  AST  109<H>  /  ALT  76<H>  /  AlkPhos  127<H>  04-17    PTT - ( 18 Apr 2024 16:42 )  PTT:58.7 sec  Urinalysis Basic - ( 18 Apr 2024 09:55 )    Color: x / Appearance: x / SG: x / pH: x  Gluc: 202 mg/dL / Ketone: x  / Bili: x / Urobili: x   Blood: x / Protein: x / Nitrite: x   Leuk Esterase: x / RBC: x / WBC x   Sq Epi: x / Non Sq Epi: x / Bacteria: x          ECG:  	  RADIOLOGY:   DIAGNOSTIC TESTING:    < from: TTE W or WO Ultrasound Enhancing Agent (01.29.24 @ 18:15) >  CONCLUSIONS:      1. Left ventricular cavity is small. Left ventricular wall thickness is normal. Left ventricular systolic function is normal with an ejection fraction of 47 % by Ulloa's method of disks.   2. Unable to evaluate wall motion due to poor image quality. There is possible apical hypokinesis.   3. There is severe (grade 3) left ventricular diastolic dysfunction, with elevated filling pressure.   4. Normal right ventricular cavity size and normalsystolic function.   5. There is mild tricuspid regurgitation. Estimated pulmonary artery systolic pressure is 23 mmHg.   6. Compared to the transthoracic echocardiogram performed on 11/26/2023, there have been no significant interval changes.    < end of copied text >      < from: Cardiac Catheterization (04.17.24 @ 17:40) >  Procedures Performed   Procedures:                 1.    Arterial Access - Right Femoral   2.    Diagnostic Coronary Angiography   3.    Ultrasound Guided Access   4.    PCI: TITO     Indications:               Unstable angina   ACS greater than 24 hours     Lab Visit Indication:      ACS greater than 24 hours   PCI Status:                elective     Conclusions:   Severe critical dLAD stenosis progressed from prior recent LHC treated  with TITO x1.    Recommendations:     Triple therapy x 1 week then Plavix/Eliquis thereafter.      Diagnostic Findings:     Coronary Angiography   The coronary circulation is co-dominant.      LM   Left main artery: Angiography shows minor irregularities.      LAD   Distal left anterior descending: There is a 99 % stenosis. MIN Flow  2. Proximal left anterior descending: Angiography shows  minor irregularities. patent prior stent. First diagonal: Angiography  shows severe atherosclerosis.    CX   Circumflex: Angiography shows diffuse mild atherosclerosis.      RCA   Right coronary artery: Angiography shows moderate atherosclerosis.      Ramus   Ramus intermedius: Angiography shows complete occlusion.      Left Heart Cath   LV to AO pullback was performed.      < end of copied text >    [ ] Stress Test:    [ ] YVES  [ ] CCTA  OTHER: 	   [facility] INVASIVE CARDIOLOGY -- Warren Mckay Kaplan, Meraj, Faisal, Joshua, Bruce, Navdeep Alvarado Johnston-CESILIA Willis Chawla  Cardiac cath/EP lab - [facility] Team  [phone]     Interventional Cardiology Post Cath Progress Note:              Subjective: No acute events overnight. Patient seen and assessed at bedside. Endorses feeling well. Denies chest pain, palpitations, SOB, PND/orthopnea, leg pain/edema, pain, numbness/tingling/swelling around groin access site.    Tele 24hrs: AF 80-100s    MEDICATIONS  (STANDING):  apixaban 2.5 milliGRAM(s) Oral every 12 hours  aspirin enteric coated 81 milliGRAM(s) Oral daily  atorvastatin 80 milliGRAM(s) Oral at bedtime  clopidogrel Tablet 75 milliGRAM(s) Oral daily  dapagliflozin 10 milliGRAM(s) Oral every 24 hours  dextrose 10% Bolus 125 milliLiter(s) IV Bolus once  dextrose 5%. 1000 milliLiter(s) (50 mL/Hr) IV Continuous <Continuous>  dextrose 5%. 1000 milliLiter(s) (100 mL/Hr) IV Continuous <Continuous>  dextrose 50% Injectable 12.5 Gram(s) IV Push once  dextrose 50% Injectable 25 Gram(s) IV Push once  furosemide   Injectable 20 milliGRAM(s) IV Push daily  glucagon  Injectable 1 milliGRAM(s) IntraMuscular once  influenza  Vaccine (HIGH DOSE) 0.7 milliLiter(s) IntraMuscular once  insulin glargine Injectable (LANTUS) 15 Unit(s) SubCutaneous at bedtime  insulin lispro (ADMELOG) corrective regimen sliding scale   SubCutaneous at bedtime  insulin lispro (ADMELOG) corrective regimen sliding scale   SubCutaneous three times a day before meals  insulin lispro Injectable (ADMELOG) 7 Unit(s) SubCutaneous three times a day before meals  isosorbide   dinitrate Tablet (ISORDIL) 30 milliGRAM(s) Oral three times a day  metoprolol tartrate 25 milliGRAM(s) Oral two times a day    MEDICATIONS  (PRN):  dextrose Oral Gel 15 Gram(s) Oral once PRN Blood Glucose LESS THAN 70 milliGRAM(s)/deciliter  nitroglycerin     SubLingual 0.4 milliGRAM(s) SubLingual every 5 minutes PRN Chest Pain      Vital Signs Last 24 Hrs  T(C): 36.4 (19 Apr 2024 04:28), Max: 36.7 (18 Apr 2024 18:13)  T(F): 97.6 (19 Apr 2024 04:28), Max: 98 (18 Apr 2024 18:13)  HR: 76 (19 Apr 2024 04:28) (76 - 94)  BP: 138/72 (19 Apr 2024 04:28) (119/67 - 162/63)  BP(mean): 96 (18 Apr 2024 18:15) (96 - 99)  RR: 18 (19 Apr 2024 04:28) (18 - 18)  SpO2: 94% (19 Apr 2024 04:28) (94% - 98%)    Parameters below as of 18 Apr 2024 18:15  Patient On (Oxygen Delivery Method): room air        Intake and output:    04-17-24 @ 07:01  -  04-18-24 @ 07:00  --------------------------------------------------------  IN: 120 mL / OUT: 2250 mL / NET: -2130 mL    04-18-24 @ 07:01  -  04-19-24 @ 06:47  --------------------------------------------------------  IN: 808 mL / OUT: 1980 mL / NET: -1172 mL        PHYSICAL EXAM:  General: Awake and alert.  No acute distress.  Neck: Supple.  Full range of motion.  No JVD.  No LAD.  No thyromegaly.  Trachea midline.  2+ carotid pulses bilaterally, no carotid bruit.  Cardiovascular: Normal S1 S2, No JVD, No murmurs, rubs, gallops or clicks  Respiratory: Nonlabored breathing, good inspiratory effort. Lungs clear to auscultation. No Rales, Rhonchi, Wheezing	  Vascular: groin site stable; soft, no bleeding or hematoma, no bruit, + pulses     Labs:                        10.6   11.88 )-----------( 406      ( 18 Apr 2024 09:55 )             34.3     04-18    134<L>  |  96  |  43<H>  ----------------------------<  202<H>  4.7   |  24  |  1.83<H>    Ca    10.0      18 Apr 2024 09:55    TPro  6.4  /  Alb  3.2<L>  /  TBili  1.5<H>  /  DBili  x   /  AST  109<H>  /  ALT  76<H>  /  AlkPhos  127<H>  04-17    PTT - ( 18 Apr 2024 16:42 )  PTT:58.7 sec  Urinalysis Basic - ( 18 Apr 2024 09:55 )    Color: x / Appearance: x / SG: x / pH: x  Gluc: 202 mg/dL / Ketone: x  / Bili: x / Urobili: x   Blood: x / Protein: x / Nitrite: x   Leuk Esterase: x / RBC: x / WBC x   Sq Epi: x / Non Sq Epi: x / Bacteria: x          ECG:  	  RADIOLOGY:   DIAGNOSTIC TESTING:    < from: TTE W or WO Ultrasound Enhancing Agent (01.29.24 @ 18:15) >  CONCLUSIONS:      1. Left ventricular cavity is small. Left ventricular wall thickness is normal. Left ventricular systolic function is normal with an ejection fraction of 47 % by Ulloa's method of disks.   2. Unable to evaluate wall motion due to poor image quality. There is possible apical hypokinesis.   3. There is severe (grade 3) left ventricular diastolic dysfunction, with elevated filling pressure.   4. Normal right ventricular cavity size and normalsystolic function.   5. There is mild tricuspid regurgitation. Estimated pulmonary artery systolic pressure is 23 mmHg.   6. Compared to the transthoracic echocardiogram performed on 11/26/2023, there have been no significant interval changes.    < end of copied text >      < from: Cardiac Catheterization (04.17.24 @ 17:40) >  Procedures Performed   Procedures:                 1.    Arterial Access - Right Femoral   2.    Diagnostic Coronary Angiography   3.    Ultrasound Guided Access   4.    PCI: TITO     Indications:               Unstable angina   ACS greater than 24 hours     Lab Visit Indication:      ACS greater than 24 hours   PCI Status:                elective     Conclusions:   Severe critical dLAD stenosis progressed from prior recent LHC treated  with TITO x1.    Recommendations:     Triple therapy x 1 week then Plavix/Eliquis thereafter.      Diagnostic Findings:     Coronary Angiography   The coronary circulation is co-dominant.      LM   Left main artery: Angiography shows minor irregularities.      LAD   Distal left anterior descending: There is a 99 % stenosis. MIN Flow  2. Proximal left anterior descending: Angiography shows  minor irregularities. patent prior stent. First diagonal: Angiography  shows severe atherosclerosis.    CX   Circumflex: Angiography shows diffuse mild atherosclerosis.      RCA   Right coronary artery: Angiography shows moderate atherosclerosis.      Ramus   Ramus intermedius: Angiography shows complete occlusion.      Left Heart Cath   LV to AO pullback was performed.      < end of copied text >    [ ] Stress Test:    [ ] YVES  [ ] CCTA  OTHER:

## 2024-04-19 NOTE — DISCHARGE NOTE PROVIDER - NSDCMRMEDTOKEN_GEN_ALL_CORE_FT
apixaban 2.5 mg oral tablet: 1 tab(s) orally every 12 hours  aspirin 81 mg oral delayed release tablet: 1 tab(s) orally once a day  atorvastatin 80 mg oral tablet: 1 tab(s) orally once a day (at bedtime)  clopidogrel 75 mg oral tablet: 1 tab(s) orally once a day  ezetimibe 10 mg oral tablet: 1 tab(s) orally once a day  Farxiga 10 mg oral tablet: 1 tab(s) orally once a day  furosemide 40 mg oral tablet: 1 tab(s) orally once a day  isosorbide dinitrate 30 mg oral tablet: 1 tab(s) orally 3 times a day  metoprolol tartrate 25 mg oral tablet: 1 tab(s) orally 2 times a day MDD: 2 tab  multivitamin gummies: 1 gummy orally once a day  Semglee (Prefilled Pen) 100 units/mL subcutaneous solution: 28-30 units once a day  valsartan 40 mg oral tablet: 1 tab(s) orally once a day

## 2024-04-19 NOTE — PROGRESS NOTE ADULT - SUBJECTIVE AND OBJECTIVE BOX
Chief complaint    Patient is a 69y old  Male who presents with a chief complaint of Chest pain     (18 Apr 2024 14:42)   Review of systems  Patient appears comfortable.    Labs and Fingersticks  CAPILLARY BLOOD GLUCOSE      POCT Blood Glucose.: 144 mg/dL (19 Apr 2024 09:00)  POCT Blood Glucose.: 192 mg/dL (18 Apr 2024 21:54)  POCT Blood Glucose.: 341 mg/dL (18 Apr 2024 17:42)  POCT Blood Glucose.: 352 mg/dL (18 Apr 2024 17:40)  POCT Blood Glucose.: 367 mg/dL (18 Apr 2024 13:01)      Anion Gap: 13 (04-19 @ 06:47)  Anion Gap: 14 (04-18 @ 09:55)      Calcium: 9.8 (04-19 @ 06:47)  Calcium: 10.0 (04-18 @ 09:55)          04-19    136  |  100  |  50<H>  ----------------------------<  140<H>  4.6   |  23  |  1.89<H>    Ca    9.8      19 Apr 2024 06:47                          10.6   10.72 )-----------( 367      ( 19 Apr 2024 06:45 )             33.3     Medications  MEDICATIONS  (STANDING):  apixaban 2.5 milliGRAM(s) Oral every 12 hours  aspirin enteric coated 81 milliGRAM(s) Oral daily  atorvastatin 80 milliGRAM(s) Oral at bedtime  clopidogrel Tablet 75 milliGRAM(s) Oral daily  dapagliflozin 10 milliGRAM(s) Oral every 24 hours  dextrose 10% Bolus 125 milliLiter(s) IV Bolus once  dextrose 5%. 1000 milliLiter(s) (50 mL/Hr) IV Continuous <Continuous>  dextrose 5%. 1000 milliLiter(s) (100 mL/Hr) IV Continuous <Continuous>  dextrose 50% Injectable 25 Gram(s) IV Push once  dextrose 50% Injectable 12.5 Gram(s) IV Push once  furosemide   Injectable 20 milliGRAM(s) IV Push daily  glucagon  Injectable 1 milliGRAM(s) IntraMuscular once  influenza  Vaccine (HIGH DOSE) 0.7 milliLiter(s) IntraMuscular once  insulin glargine Injectable (LANTUS) 15 Unit(s) SubCutaneous at bedtime  insulin lispro (ADMELOG) corrective regimen sliding scale   SubCutaneous at bedtime  insulin lispro (ADMELOG) corrective regimen sliding scale   SubCutaneous three times a day before meals  insulin lispro Injectable (ADMELOG) 7 Unit(s) SubCutaneous three times a day before meals  isosorbide   dinitrate Tablet (ISORDIL) 30 milliGRAM(s) Oral three times a day  metoprolol tartrate 25 milliGRAM(s) Oral two times a day      Physical Exam  General: Patient appears comfortable.  Vital Signs Last 12 Hrs  T(F): 98 (04-19-24 @ 08:15), Max: 98 (04-19-24 @ 08:15)  HR: 72 (04-19-24 @ 08:15) (72 - 76)  BP: 124/69 (04-19-24 @ 08:15) (124/69 - 138/72)  BP(mean): --  RR: 18 (04-19-24 @ 08:15) (18 - 18)  SpO2: 95% (04-19-24 @ 08:15) (94% - 95%)  Neck: No palpable thyroid nodules.  CVS: S1S2, No murmurs  Respiratory: No wheezing, no crepitations  GI: Abdomen soft, non tender.    Diagnostics        Radiology:

## 2024-04-19 NOTE — PROGRESS NOTE ADULT - PROBLEM SELECTOR PLAN 2
Suggest to continue medications, monitoring, FU primary team recommendations.
Suggest to continue medications, monitoring, FU primary team recommendations.

## 2024-04-19 NOTE — PROGRESS NOTE ADULT - ASSESSMENT
68 y/o man  h/o severe CAD S/P multiple PCI's including multiple LM and LAD stents. +A-fib, HTN, HLD, T2DM, CKD3, former smoker presents with SOB and CP + troponin --> 594 s/p CATH yesterday with 2.0 mm caliber TITO to distal LAD by Dr ILDA Dominguez  - DAPT  - continue parenteral diuretics  - Max dose statin  - cont current diabetic regimen  - Was On IV heparin - changed back to DOAC  - condition improved. Continue present care , no objection to DC

## 2024-04-19 NOTE — PROGRESS NOTE ADULT - PROVIDER SPECIALTY LIST ADULT
Cardiology
Intervent Cardiology
Nephrology
Cardiology
Internal Medicine
Intervent Cardiology
Nephrology
Cardiology
Internal Medicine
Intervent Cardiology
Endocrinology
Endocrinology

## 2024-04-19 NOTE — DISCHARGE NOTE NURSING/CASE MANAGEMENT/SOCIAL WORK - NSDCPEFALRISK_GEN_ALL_CORE
For information on Fall & Injury Prevention, visit: [email or web address] OR  [email or web address] OR  https://www.cdc.gov/steadi/patient.html

## 2024-04-19 NOTE — PROGRESS NOTE ADULT - ASSESSMENT
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 Now s/p TITO x1 dLAD 4/17    # CAD  - Now s/p cardiac cath 4/17/24: TITO x1 dLAD via RFA  - Right groin site stable; soft and no hematoma  - TAPT: Continue ASA 81mg qd, Plavix 75mg qd, Eliquis 5mg BID. Drop ASA after 1 week  - Continue Atorvastatin 80mg qhs  - Recommend a heart healthy diet and avoid using NSAIDs  - Rest of care per primary team    # HFmrEF  - TTE: LVEF 47%  - GDMT: Continue Metoprolol tartrate 25mg BID, Lasix 20mg IV qd, Farxiga 10mg qd    Trisha Oconnor PA-C  Interventional Cardiology  Please check [employer] password cardfellows for cardiology service schedule and contact information via TEAMS.  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 Now s/p TITO x1 dLAD 4/17    # CAD  - Now s/p cardiac cath 4/17/24: TITO x1 dLAD via RFA  - Right groin site stable; soft and no hematoma  - TAPT: Continue ASA 81mg qd, Plavix 75mg qd, Eliquis 5mg BID. Drop ASA after 1 week  - Continue Atorvastatin 80mg qhs  - Recommend a heart healthy diet and avoid using NSAIDs  - Rest of care per primary team    # HFmrEF  - TTE: LVEF 47%  - GDMT: Continue Metoprolol tartrate 25mg BID, Lasix 20mg IV qd, Farxiga 10mg qd    Trisha Oconnor PA-C  Interventional Cardiology  Please check [employer] password cardfellows for cardiology service schedule and contact information via TEAMS.  [phone]

## 2024-04-19 NOTE — DISCHARGE NOTE PROVIDER - NSDCFUADDAPPT_GEN_ALL_CORE_FT
APPTS ARE READY TO BE MADE: [x] YES    Best Family or Patient Contact (if needed):    Additional Information about above appointments (if needed):    1:   2:   3:     Other comments or requests:    APPTS ARE READY TO BE MADE: [x] YES    Best Family or Patient Contact (if needed):    Additional Information about above appointments (if needed):    1:   2:   3:     Other comments or requests:   Dr. Carl and Dr. Walton's office will return the call to schedule both appointments.     Patient informed us they already have secured a follow up appointment which is not visible on [employer]. Patient scheduled with Dr. oMon on May 2nd.     Patient was scheduled on 5/23/24 at 1:15P with Dr. Rossi. Provider [place] be out of [place] and was not able to offer sooner, the office will outreach the patient.  APPTS ARE READY TO BE MADE: [x] YES    Best Family or Patient Contact (if needed):    Additional Information about above appointments (if needed):    1:   2:   3:     Other comments or requests:   Patient is scheduled for both appointments. Dr. Walton 4/29 6:40pm. Patient is scheduled with Dr. Carl 4/29 5:15pm so patient will have appointments on the same day.     Patient informed us they already have secured a follow up appointment which is not visible on [employer]. Patient scheduled with Dr. Moon on May 2nd.     Patient was scheduled on 5/23/24 at 1:15P with Dr. Rossi. Provider will be out of country and was not able to offer sooner, the office will outreach the patient.

## 2024-04-19 NOTE — DISCHARGE NOTE PROVIDER - CARE PROVIDER_API CALL
Noa Carl  Family Medicine  [address], [address]  [place] [address]  Phone: [phone]  Fax: [phone]  Follow Up Time: 1 week    Alfred Moon  Cardiology  [address] [place] [address]  Phone: [phone]  Fax: [phone]  Follow Up Time: 1 week    Markell Walton  Nephrology  [address], [facility] [address]  [place] [address]  Phone: [phone]  Fax: [phone]  Follow Up Time: 1 week    Myrna Rossi)  Endocrinology/Metab/Diabetes  [address] [place] [address]  Phone: [phone]  Fax: [phone]  Follow Up Time: 1 week

## 2024-04-19 NOTE — PROGRESS NOTE ADULT - SUBJECTIVE AND OBJECTIVE BOX
SUBJECTIVE:  NO CP no SOB Feels well this am     MEDICATIONS:  furosemide   Injectable 20 milliGRAM(s) IV Push daily  isosorbide   dinitrate Tablet (ISORDIL) 30 milliGRAM(s) Oral three times a day  metoprolol tartrate 25 milliGRAM(s) Oral two times a day  nitroglycerin     SubLingual 0.4 milliGRAM(s) SubLingual every 5 minutes PRN            atorvastatin 80 milliGRAM(s) Oral at bedtime  dapagliflozin 10 milliGRAM(s) Oral every 24 hours  dextrose 50% Injectable 25 Gram(s) IV Push once  dextrose 50% Injectable 12.5 Gram(s) IV Push once  dextrose Oral Gel 15 Gram(s) Oral once PRN  glucagon  Injectable 1 milliGRAM(s) IntraMuscular once  insulin glargine Injectable (LANTUS) 15 Unit(s) SubCutaneous at bedtime  insulin lispro (ADMELOG) corrective regimen sliding scale   SubCutaneous at bedtime  insulin lispro (ADMELOG) corrective regimen sliding scale   SubCutaneous three times a day before meals  insulin lispro Injectable (ADMELOG) 7 Unit(s) SubCutaneous three times a day before meals    apixaban 2.5 milliGRAM(s) Oral every 12 hours  aspirin enteric coated 81 milliGRAM(s) Oral daily  clopidogrel Tablet 75 milliGRAM(s) Oral daily  dextrose 10% Bolus 125 milliLiter(s) IV Bolus once  dextrose 5%. 1000 milliLiter(s) IV Continuous <Continuous>  dextrose 5%. 1000 milliLiter(s) IV Continuous <Continuous>  influenza  Vaccine (HIGH DOSE) 0.7 milliLiter(s) IntraMuscular once      REVIEW OF SYSTEMS:    CONSTITUTIONAL: No fever, weight loss, or fatigue  EYES: No eye pain, visual disturbances, or discharge  NECK: No pain or stiffness  RESPIRATORY: No cough, wheezing, chills or hemoptysis; No Shortness of Breath  CARDIOVASCULAR: No chest pain, palpitations, dizziness, or leg swelling  GASTROINTESTINAL: No abdominal or epigastric pain. No nausea, vomiting, or hematemesis; No diarrhea or constipation. No melena or hematochezia.  GENITOURINARY: No dysuria, frequency, hematuria, or incontinence  NEUROLOGICAL: No headaches, memory loss, loss of strength, numbness, or tremors  SKIN: No itching, burning, rashes, or lesions   LYMPH Nodes: No enlarged glands  MUSCULOSKELETAL: No joint pain or swelling; No muscle, back, or extremity pain  All other review of systems are negative.  	  [ ] Unable to obtain    PHYSICAL EXAM:  T(C): 36.7 (04-19-24 @ 08:15), Max: 36.7 (04-18-24 @ 18:13)  HR: 72 (04-19-24 @ 08:15) (72 - 94)  BP: 124/69 (04-19-24 @ 08:15) (119/67 - 162/63)  RR: 18 (04-19-24 @ 08:15) (18 - 18)  SpO2: 95% (04-19-24 @ 08:15) (94% - 98%)  Wt(kg): --  I&O's Summary    18 Apr 2024 07:01  -  19 Apr 2024 07:00  --------------------------------------------------------  IN: 808 mL / OUT: 2780 mL / NET: -1972 mL    19 Apr 2024 07:01  -  19 Apr 2024 09:38  --------------------------------------------------------  IN: 0 mL / OUT: 350 mL / NET: -350 mL          PHYSICAL EXAM    Appearance: Normal	  HEENT:   Normal oral mucosa, PERRL, EOMI	  NECK: Soft and supple, No LAD, No JVD  Cardiovascular: Regular Rate and Rhythm, Normal S1 S2, No murmurs, No clicks, gallops or rubs  Respiratory: Lungs clear to auscultation	  Gastrointestinal:  Soft, Non-tender, + BS	  Skin: No rashes, No ecchymoses, No cyanosis  Neurologic: Non-focal  Extremities: No clubbing, cyanosis or edema  Vascular: Peripheral pulses palpable 2+ bilaterally    TELEMETRY: 	AFlutter  70- - 100        LABS:	 	                         10.6   10.72 )-----------( 367      ( 19 Apr 2024 06:45 )             33.3     04-19    136  |  100  |  50<H>  ----------------------------<  140<H>  4.6   |  23  |  1.89<H>    Ca    9.8      19 Apr 2024 06:47

## 2024-04-19 NOTE — DISCHARGE NOTE PROVIDER - NSDCCPCAREPLAN_GEN_ALL_CORE_FT
PRINCIPAL DISCHARGE DIAGNOSIS  Diagnosis: Chest pain  Assessment and Plan of Treatment: You had a cardiac cath on  4/17/24 with a stent placed.   Continue with plavix, atorvastatin, and eliquis.   Continue Aspirin for 7 days until 4/24/24  Recommend a heart healthy diet and avoid using NSAID medications      SECONDARY DISCHARGE DIAGNOSES  Diagnosis: DM2 (diabetes mellitus, type 2)  Assessment and Plan of Treatment: Continue with home diabetes medications    Diagnosis: CHF (congestive heart failure)  Assessment and Plan of Treatment: Continue lasix

## 2024-04-19 NOTE — PROGRESS NOTE ADULT - SUBJECTIVE AND OBJECTIVE BOX
Date of service: 24 @ 14:03      Patient is a 69y old  Male who presents with a chief complaint of CP SOB (2024 09:37)                                                               INTERVAL HPI/OVERNIGHT EVENTS:    REVIEW OF SYSTEMS:     CONSTITUTIONAL: No weakness, fevers or chills  EYES/ENT: No visual changes , no ear ache   NECK: No pain or stiffness  RESPIRATORY: No cough, wheezing,  No shortness of breath  CARDIOVASCULAR: No chest pain or palpitations  GASTROINTESTINAL: No abdominal pain  . No nausea, vomiting, or hematemesis; No diarrhea or constipation. No melena or hematochezia.  GENITOURINARY: No dysuria, frequency or hematuria  NEUROLOGICAL: No numbness or weakness  SKIN: No itching, burning, rashes, or lesions                                                                                                                                                                                                                                                                                 Medications:  MEDICATIONS  (STANDING):  apixaban 2.5 milliGRAM(s) Oral every 12 hours  aspirin enteric coated 81 milliGRAM(s) Oral daily  atorvastatin 80 milliGRAM(s) Oral at bedtime  clopidogrel Tablet 75 milliGRAM(s) Oral daily  dapagliflozin 10 milliGRAM(s) Oral every 24 hours  dextrose 10% Bolus 125 milliLiter(s) IV Bolus once  dextrose 5%. 1000 milliLiter(s) (50 mL/Hr) IV Continuous <Continuous>  dextrose 5%. 1000 milliLiter(s) (100 mL/Hr) IV Continuous <Continuous>  dextrose 50% Injectable 25 Gram(s) IV Push once  dextrose 50% Injectable 12.5 Gram(s) IV Push once  furosemide   Injectable 20 milliGRAM(s) IV Push daily  glucagon  Injectable 1 milliGRAM(s) IntraMuscular once  influenza  Vaccine (HIGH DOSE) 0.7 milliLiter(s) IntraMuscular once  insulin glargine Injectable (LANTUS) 15 Unit(s) SubCutaneous at bedtime  insulin lispro (ADMELOG) corrective regimen sliding scale   SubCutaneous at bedtime  insulin lispro (ADMELOG) corrective regimen sliding scale   SubCutaneous three times a day before meals  insulin lispro Injectable (ADMELOG) 7 Unit(s) SubCutaneous three times a day before meals  isosorbide   dinitrate Tablet (ISORDIL) 30 milliGRAM(s) Oral three times a day  metoprolol tartrate 25 milliGRAM(s) Oral two times a day    MEDICATIONS  (PRN):  dextrose Oral Gel 15 Gram(s) Oral once PRN Blood Glucose LESS THAN 70 milliGRAM(s)/deciliter  nitroglycerin     SubLingual 0.4 milliGRAM(s) SubLingual every 5 minutes PRN Chest Pain       Allergies    No Known Allergies    Intolerances      Vital Signs Last 24 Hrs  T(C): 36.7 (2024 12:15), Max: 36.7 (2024 18:13)  T(F): 98 (2024 12:15), Max: 98 (2024 18:13)  HR: 85 (2024 12:15) (72 - 89)  BP: 127/81 (2024 12:15) (119/67 - 162/63)  BP(mean): 96 (2024 18:15) (96 - 96)  RR: 18 (2024 12:15) (18 - 18)  SpO2: 96% (2024 12:15) (94% - 98%)    Parameters below as of 2024 12:15  Patient On (Oxygen Delivery Method): room air      CAPILLARY BLOOD GLUCOSE      POCT Blood Glucose.: 270 mg/dL (2024 12:28)  POCT Blood Glucose.: 144 mg/dL (2024 09:00)  POCT Blood Glucose.: 192 mg/dL (2024 21:54)  POCT Blood Glucose.: 341 mg/dL (2024 17:42)  POCT Blood Glucose.: 352 mg/dL (2024 17:40)       @ 07:  -   @ 07:00  --------------------------------------------------------  IN: 808 mL / OUT: 2780 mL / NET: -1972 mL     @ 07:  -   @ 14:03  --------------------------------------------------------  IN: 740 mL / OUT: 850 mL / NET: -110 mL      Physical Exam:    Daily     Daily Weight in k.4 (2024 04:28)  General:  Well appearing, NAD, not cachetic  HEENT:  Nonicteric, PERRLA  CV:  RRR, S1S2   Lungs:  CTA B/L, no wheezes, rales, rhonchi  Abdomen:  Soft, non-tender, no distended, positive BS  Extremities:  trace edema   Neuro:  AAOx3, non-focal, grossly intact                                                                                                                                                                                                                                                                                                LABS:                               10.6   10.72 )-----------( 367      ( 2024 06:45 )             33.3                      04-    136  |  100  |  50<H>  ----------------------------<  140<H>  4.6   |  23  |  1.89<H>    Ca    9.8      2024 06:47                         RADIOLOGY & ADDITIONAL TESTS         I personally reviewed: [  ]EKG   [  ]CXR    [  ] CT      A/P:         Discussed with :     Derek consultants' Notes   Time spent :

## 2024-04-19 NOTE — PROGRESS NOTE ADULT - SUBJECTIVE AND OBJECTIVE BOX
Overnight events noted      VITAL:  T(C): , Max: 36.7 (04-18-24 @ 18:13)  T(F): , Max: 98 (04-18-24 @ 18:13)  HR: 72 (04-19-24 @ 08:15)  BP: 124/69 (04-19-24 @ 08:15)  BP(mean): 96 (04-18-24 @ 18:15)  RR: 18 (04-19-24 @ 08:15)  SpO2: 95% (04-19-24 @ 08:15)  Wt(kg): --      PHYSICAL EXAM:  Constitutional: NAD, Alert  HEENT: NCAT, MMM  Neck: Supple, No JVD  Respiratory: CTA-b/l  Cardiovascular: RRR s1s2, no m/r/g  Gastrointestinal: BS+, soft, NT/ND  Extremities: No peripheral edema b/l  Neurological: no focal deficits; strength grossly intact  Back: no CVAT b/l  Skin: No rashes, no nevi      LABS:                        10.6   10.72 )-----------( 367      ( 19 Apr 2024 06:45 )             33.3     Na(136)/K(4.6)/Cl(100)/HCO3(23)/BUN(50)/Cr(1.89)Glu(140)/Ca(9.8)/Mg(--)/PO4(--)    04-19 @ 06:47  Na(134)/K(4.7)/Cl(96)/HCO3(24)/BUN(43)/Cr(1.83)Glu(202)/Ca(10.0)/Mg(--)/PO4(--)    04-18 @ 09:55  Na(136)/K(4.4)/Cl(100)/HCO3(23)/BUN(41)/Cr(1.74)Glu(190)/Ca(9.4)/Mg(--)/PO4(--)    04-17 @ 06:48  Na(131)/K(5.2)/Cl(97)/HCO3(21)/BUN(45)/Cr(1.77)Glu(323)/Ca(8.9)/Mg(--)/PO4(--)    04-16 @ 21:53      IMPRESSION: 69M w/ HTN, DM2, CAD, HFrEF, AFib, and CKD, 4/16/24 p/w SOB    (1)Renal - CKD3b - nonproteinuric; due to hypertension/atherosclerotic disease. No evidence to date of DENNIS s/p cath 4/17 - reasonable at this point to add back low-dose ARB  (2)Lytes - acceptable  (3)Cards - SOB on admission - now s/p PCI to LAD    RECOMMEND:  (1)Can add back low-dose ARB (Valsartan 40mg po qd)  (2)Lasix 40mg po qd  (3)F/U at my office Monday 4/29/24 as previously scheduled          Markell Walton MD  [facility]  Office/on call physician: [phone]  Cell (7a-7p): [phone]       no pain, no sob      VITAL:  T(C): , Max: 36.7 (04-18-24 @ 18:13)  T(F): , Max: 98 (04-18-24 @ 18:13)  HR: 72 (04-19-24 @ 08:15)  BP: 124/69 (04-19-24 @ 08:15)  BP(mean): 96 (04-18-24 @ 18:15)  RR: 18 (04-19-24 @ 08:15)  SpO2: 95% (04-19-24 @ 08:15)  Wt(kg): --      PHYSICAL EXAM:  Constitutional: NAD, Alert  HEENT: NCAT, MMM  Neck: Supple, No JVD  Respiratory: CTA-b/l  Cardiovascular: RRR s1s2, no m/r/g  Gastrointestinal: BS+, soft, NT/ND  Extremities: No peripheral edema b/l  Neurological: no focal deficits; strength grossly intact  Back: no CVAT b/l  Skin: No rashes, no nevi      LABS:                        10.6   10.72 )-----------( 367      ( 19 Apr 2024 06:45 )             33.3     Na(136)/K(4.6)/Cl(100)/HCO3(23)/BUN(50)/Cr(1.89)Glu(140)/Ca(9.8)/Mg(--)/PO4(--)    04-19 @ 06:47  Na(134)/K(4.7)/Cl(96)/HCO3(24)/BUN(43)/Cr(1.83)Glu(202)/Ca(10.0)/Mg(--)/PO4(--)    04-18 @ 09:55  Na(136)/K(4.4)/Cl(100)/HCO3(23)/BUN(41)/Cr(1.74)Glu(190)/Ca(9.4)/Mg(--)/PO4(--)    04-17 @ 06:48  Na(131)/K(5.2)/Cl(97)/HCO3(21)/BUN(45)/Cr(1.77)Glu(323)/Ca(8.9)/Mg(--)/PO4(--)    04-16 @ 21:53      IMPRESSION: 69M w/ HTN, DM2, CAD, HFrEF, AFib, and CKD, 4/16/24 p/w SOB    (1)Renal - CKD3b - nonproteinuric; due to hypertension/atherosclerotic disease. No evidence to date of DENNIS s/p cath 4/17 - reasonable at this point to add back low-dose ARB  (2)Lytes - acceptable  (3)Cards - SOB on admission - now s/p PCI to LAD    RECOMMEND:  (1)Can add back low-dose ARB (Valsartan 40mg po qd)  (2)Lasix 40mg po qd  (3)F/U at my office Monday 4/29/24 as previously scheduled          Markell Walton MD  [facility]  Office/on call physician: [phone]  Cell (7a-7p): [phone]

## 2024-04-25 ENCOUNTER — INPATIENT (INPATIENT)
Facility: HOSPITAL | Age: 69
LOS: 18 days | Discharge: NURSING, REHAB OR LONG-TERM CARE | DRG: 558 | End: 2024-05-14
Attending: GENERAL ACUTE CARE HOSPITAL | Admitting: INTERNAL MEDICINE
Payer: COMMERCIAL

## 2024-04-25 VITALS
WEIGHT: 130.07 LBS | RESPIRATION RATE: 18 BRPM | HEIGHT: 66 IN | OXYGEN SATURATION: 98 % | DIASTOLIC BLOOD PRESSURE: 73 MMHG | TEMPERATURE: 97 F | HEART RATE: 105 BPM | SYSTOLIC BLOOD PRESSURE: 133 MMHG

## 2024-04-25 DIAGNOSIS — M62.82 RHABDOMYOLYSIS: ICD-10-CM

## 2024-04-25 LAB
ADD ON TEST-SPECIMEN IN LAB: SIGNIFICANT CHANGE UP
ALBUMIN SERPL ELPH-MCNC: 3.6 G/DL — SIGNIFICANT CHANGE UP (ref 3.3–5)
ALP SERPL-CCNC: 123 U/L — HIGH (ref 40–120)
ALT FLD-CCNC: 707 U/L — HIGH (ref 10–45)
ANION GAP SERPL CALC-SCNC: 14 MMOL/L — SIGNIFICANT CHANGE UP (ref 5–17)
AST SERPL-CCNC: 1332 U/L — HIGH (ref 10–40)
BASOPHILS # BLD AUTO: 0.03 K/UL — SIGNIFICANT CHANGE UP (ref 0–0.2)
BASOPHILS NFR BLD AUTO: 0.2 % — SIGNIFICANT CHANGE UP (ref 0–2)
BILIRUB SERPL-MCNC: 1.4 MG/DL — HIGH (ref 0.2–1.2)
BUN SERPL-MCNC: 61 MG/DL — HIGH (ref 7–23)
CALCIUM SERPL-MCNC: 9.6 MG/DL — SIGNIFICANT CHANGE UP (ref 8.4–10.5)
CHLORIDE SERPL-SCNC: 98 MMOL/L — SIGNIFICANT CHANGE UP (ref 96–108)
CK SERPL-CCNC: CRITICAL HIGH U/L (ref 30–200)
CO2 SERPL-SCNC: 21 MMOL/L — LOW (ref 22–31)
CREAT SERPL-MCNC: 1.92 MG/DL — HIGH (ref 0.5–1.3)
EGFR: 38 ML/MIN/1.73M2 — LOW
EOSINOPHIL # BLD AUTO: 0.34 K/UL — SIGNIFICANT CHANGE UP (ref 0–0.5)
EOSINOPHIL NFR BLD AUTO: 2.3 % — SIGNIFICANT CHANGE UP (ref 0–6)
GLUCOSE SERPL-MCNC: 184 MG/DL — HIGH (ref 70–99)
HCT VFR BLD CALC: 40.5 % — SIGNIFICANT CHANGE UP (ref 39–50)
HGB BLD-MCNC: 12.7 G/DL — LOW (ref 13–17)
IMM GRANULOCYTES NFR BLD AUTO: 0.7 % — SIGNIFICANT CHANGE UP (ref 0–0.9)
LYMPHOCYTES # BLD AUTO: 0.65 K/UL — LOW (ref 1–3.3)
LYMPHOCYTES # BLD AUTO: 4.3 % — LOW (ref 13–44)
MAGNESIUM SERPL-MCNC: 2.7 MG/DL — HIGH (ref 1.6–2.6)
MCHC RBC-ENTMCNC: 27.6 PG — SIGNIFICANT CHANGE UP (ref 27–34)
MCHC RBC-ENTMCNC: 31.4 GM/DL — LOW (ref 32–36)
MCV RBC AUTO: 88 FL — SIGNIFICANT CHANGE UP (ref 80–100)
MONOCYTES # BLD AUTO: 0.96 K/UL — HIGH (ref 0–0.9)
MONOCYTES NFR BLD AUTO: 6.4 % — SIGNIFICANT CHANGE UP (ref 2–14)
NEUTROPHILS # BLD AUTO: 13.01 K/UL — HIGH (ref 1.8–7.4)
NEUTROPHILS NFR BLD AUTO: 86.1 % — HIGH (ref 43–77)
NRBC # BLD: 0 /100 WBCS — SIGNIFICANT CHANGE UP (ref 0–0)
PLATELET # BLD AUTO: 531 K/UL — HIGH (ref 150–400)
POTASSIUM SERPL-MCNC: 4.8 MMOL/L — SIGNIFICANT CHANGE UP (ref 3.5–5.3)
POTASSIUM SERPL-SCNC: 4.8 MMOL/L — SIGNIFICANT CHANGE UP (ref 3.5–5.3)
PROT SERPL-MCNC: 6.9 G/DL — SIGNIFICANT CHANGE UP (ref 6–8.3)
RBC # BLD: 4.6 M/UL — SIGNIFICANT CHANGE UP (ref 4.2–5.8)
RBC # FLD: 16.6 % — HIGH (ref 10.3–14.5)
SODIUM SERPL-SCNC: 133 MMOL/L — LOW (ref 135–145)
TROPONIN T, HIGH SENSITIVITY RESULT: 505 NG/L — HIGH (ref 0–51)
TROPONIN T, HIGH SENSITIVITY RESULT: 506 NG/L — HIGH (ref 0–51)
WBC # BLD: 15.09 K/UL — HIGH (ref 3.8–10.5)
WBC # FLD AUTO: 15.09 K/UL — HIGH (ref 3.8–10.5)

## 2024-04-25 PROCEDURE — 86077 PHYS BLOOD BANK SERV XMATCH: CPT

## 2024-04-25 PROCEDURE — 99223 1ST HOSP IP/OBS HIGH 75: CPT

## 2024-04-25 PROCEDURE — 99285 EMERGENCY DEPT VISIT HI MDM: CPT

## 2024-04-25 PROCEDURE — 71045 X-RAY EXAM CHEST 1 VIEW: CPT | Mod: 26

## 2024-04-25 RX ORDER — METOPROLOL TARTRATE 50 MG
25 TABLET ORAL ONCE
Refills: 0 | Status: COMPLETED | OUTPATIENT
Start: 2024-04-25 | End: 2024-04-25

## 2024-04-25 RX ORDER — SODIUM CHLORIDE 9 MG/ML
1000 INJECTION, SOLUTION INTRAVENOUS ONCE
Refills: 0 | Status: COMPLETED | OUTPATIENT
Start: 2024-04-25 | End: 2024-04-25

## 2024-04-25 RX ORDER — ACETAMINOPHEN 500 MG
975 TABLET ORAL ONCE
Refills: 0 | Status: COMPLETED | OUTPATIENT
Start: 2024-04-25 | End: 2024-04-25

## 2024-04-25 RX ADMIN — SODIUM CHLORIDE 1000 MILLILITER(S): 9 INJECTION, SOLUTION INTRAVENOUS at 22:32

## 2024-04-25 RX ADMIN — Medication 25 MILLIGRAM(S): at 19:23

## 2024-04-25 RX ADMIN — Medication 975 MILLIGRAM(S): at 19:15

## 2024-04-25 RX ADMIN — SODIUM CHLORIDE 1000 MILLILITER(S): 9 INJECTION, SOLUTION INTRAVENOUS at 20:29

## 2024-04-25 RX ADMIN — Medication 975 MILLIGRAM(S): at 19:45

## 2024-04-25 NOTE — ED PROVIDER NOTE - OBJECTIVE STATEMENT
69-year-old male with PMH CAD status post 10 stents (most recently 1 week ago,), HFrEF with EF of 50%, A-fib on triple therapy with aspirin Plavix and Eliquis, HTN, HLD, T2DM, CKD 3 presents with dyspnea on exertion and chest pain on exertion since being discharged from the hospital this past Sunday.  Patient states that since leaving the hospital he felt lots of stiffness and pain in his legs that has been making it difficult for him to walk around and exert himself.  When patient exerts himself he gets left-sided pressure-like pain in his chest 5 out of 10 nonradiating and feels short of breath.  Denies fevers, chills, cough, congestion, rhinorrhea, nausea, vomiting, abdominal pain, dysuria, constipation, diarrhea.  Patient denies any swelling from his groin site where the PCI was accessed. 69-year-old male with PMH CAD status post 10 stents (most recently 1 week ago,), HFrEF with EF of 50%, A-fib on triple therapy with aspirin Plavix and Eliquis, HTN, HLD, T2DM, CKD 3 presents with dyspnea on exertion and chest pain on exertion since being discharged from the hospital this past .  Patient states that since leaving the hospital he felt lots of stiffness and pain in his legs that has been making it difficult for him to walk around and exert himself.  When patient exerts himself he gets left-sided pressure-like pain in his chest 5 out of 10 nonradiating and feels short of breath.  Denies fevers, chills, cough, congestion, rhinorrhea, nausea, vomiting, abdominal pain, dysuria, constipation, diarrhea.  Patient denies any swelling from his groin site where the PCI was accessed.    Attendin-year-old male presents with stiffness in the legs denies chest pain.  He has mild shortness of breath on exertion.  He was also found to be tachycardic in his doctor's office today so she was referred to the emergency department for evaluation.  Here patient's heart rate is in the 90s.  He is in atrial fibrillation.

## 2024-04-25 NOTE — H&P ADULT - HISTORY OF PRESENT ILLNESS
Pt is a 68yo M w/ PMH of HTN, HLD, DM2, CAD s/p 10 stents (last 1 week ago), Afib on Eliquis, HFrEF, CKD III pw LE pain/cramping.    Pt recently admitted 04/16-04/19 for NSTEMI s/p C w/ stent placement. He now reports b/l LE pain, stiffness and cramping w/ difficulty ambulating or even crossing legs for the past 7-8 days since discharge from the hospital. He also now reports dark urine.     He also reports some mild pressure like CP since discharge for which he followed up w/ his cardiologist outpt and noted to be likely i/s/o recent cath and to c/w Isosorbide.     Today he went to PMD and i/s/o weakness in LE and tachycardia, he was advised to present to the ED.    In the ED VSS w/ labs notable for leukocytosis to 15 and Cr 1.92 (~baseline) w/ elevated liver enzymes, trop 505 -> 506, [place] [address]. He was administered 2L IVF.

## 2024-04-25 NOTE — H&P ADULT - NSHPPHYSICALEXAM_GEN_ALL_CORE
Vital Signs Last 24 Hrs  T(C): 36.3 (26 Apr 2024 00:16), Max: 36.4 (25 Apr 2024 20:30)  T(F): 97.4 (26 Apr 2024 00:16), Max: 97.5 (25 Apr 2024 20:30)  HR: 80 (26 Apr 2024 00:16) (80 - 106)  BP: 135/78 (26 Apr 2024 00:16) (107/78 - 146/74)  BP(mean): --  RR: 20 (26 Apr 2024 00:16) (18 - 20)  SpO2: 100% (26 Apr 2024 00:16) (98% - 100%)    Parameters below as of 26 Apr 2024 00:16  Patient On (Oxygen Delivery Method): room air        CONSTITUTIONAL: Well-groomed, in no apparent distress  EYES: No conjunctival or scleral injection, non-icteric;   ENMT: No external nasal lesions; MMM  NECK: Trachea midline without palpable neck mass; thyroid not enlarged and non-tender  RESPIRATORY: Breathing comfortably; no dullness to percussion; lungs CTA without wheeze/rhonchi/rales  CARDIOVASCULAR: +S1S2, RRR, no M/G/R; pedal pulses full and symmetric; no lower extremity edema  GASTROINTESTINAL: No palpable masses or tenderness, +BS throughout, no rebound/guarding; no hepatosplenomegaly; no hernia palpated  LYMPHATIC: No cervical LAD or tenderness  MSK: b/l LE weakness w/ resistance  SKIN: No rashes or ulcers noted  NEUROLOGIC: CN II-XII intact; sensation intact in LEs b/l to light touch  PSYCHIATRIC: A&Ox3; mood and affect appropriate; appropriate insight and judgment

## 2024-04-25 NOTE — H&P ADULT - PROBLEM SELECTOR PLAN 1
- CK 28K  - f/u UA  - s/p 2L IVF in the ED w/ pt reporting   - Start LR at 100cc/hr and monitor for s/s of VOL given hx of HF  - Trend CK  - Hold statin and ezetimibe

## 2024-04-25 NOTE — ED ADULT TRIAGE NOTE - CHIEF COMPLAINT QUOTE
sent from cardiologist d/t elevated HR on EKG, c/o chest pain on exertion  had stent placed last week

## 2024-04-25 NOTE — ED CLERICAL - NS ED CLERK NOTE PRE-ARRIVAL INFORMATION; ADDITIONAL PRE-ARRIVAL INFORMATION
CC/Reason For referral: rapid AF recent stent placement last week @ [facility]  Preferred Consultant(if applicable): cardiology  Who admits for you (if needed): n/a  Do you have documents you would like to fax over? no  Would you still like to speak to an ED attending? no

## 2024-04-25 NOTE — ED ADULT NURSE NOTE - OBJECTIVE STATEMENT
69y Male AOx4 with PMH afib (on Eliquis, Plavix, ASA), DM, x10 stents presents to the ED c/o chest pain. Pt states he most recent stent was placed last week, DC on Friday. Reports on Monday, he developed chest pain and SOB with exertion and "stiffness in legs". Went to cardiologist on Monday. Saw PCP today who referred pt to the ED due to "heart rate in the 100s". At this time, pt denies chest pain, SOB, N/V, fever/chills. Placed on cardiac monitor. Spontaneous/unlabored respirations, speaking in full sentences. Side rails up, bed in lowest position, oriented to call bell, safety maintained.

## 2024-04-25 NOTE — H&P ADULT - PROBLEM SELECTOR PLAN 7
- Cr appears baseline/improved  - Monitor i/s/o above  - Avoid nephrotoxic medications  - Renally dose all medications

## 2024-04-25 NOTE — ED ADULT NURSE NOTE - NSFALLRISKINTERV_ED_ALL_ED
Assistance OOB with selected safe patient handling equipment if applicable/Assistance with ambulation/Communicate fall risk and risk factors to all staff, patient, and family/Monitor gait and stability/Provide visual cue: yellow wristband, yellow gown, etc/Reinforce activity limits and safety measures with patient and family/Call bell, personal items and telephone in reach/Instruct patient to call for assistance before getting out of bed/chair/stretcher/Non-slip footwear applied when patient is off stretcher/[place] to call system/Physically safe environment - no spills, clutter or unnecessary equipment/Purposeful Proactive Rounding/Room/bathroom lighting operational, light cord in reach

## 2024-04-25 NOTE — ED PROVIDER NOTE - IV ALTEPLASE ADMIN OUTSIDE HIDDEN
PHYSICAL THERAPY HIP TREATMENT NOTE - INPATIENT    Room Number: 430/430-A            Presenting Problem: right MEAGAN revision     Problem List  Principal Problem:    Failed total hip arthroplasty [facility])  Active Problems:    Major depressive disorder    [facility] FORM - BASIC MOBILITY  How much difficulty does the patient currently have. ..  -   Turning over in bed (including adjusting bedclothes, sheets and blankets)?: A Little   -   Sitting down on and standing up from a chair with arms (e.g., wheelchair, bedside Patient verbalizes and/or demonstrates all precautions and safety concerns independently   Goal #5   Current Status Poor recall of education with need for consistent cues and prompts during fxn mobility    Goal #6 Patient independently performs home exerci show

## 2024-04-25 NOTE — H&P ADULT - PROBLEM SELECTOR PLAN 3
- s/p LHC w/ stent placement   - c/w ASA 81mg qd and Plavix 75mg qd (pt reports not missing any doses since discharge)  - c/w Isosorbide 30mg TID

## 2024-04-25 NOTE — ED PROVIDER NOTE - CLINICAL SUMMARY MEDICAL DECISION MAKING FREE TEXT BOX
Leg cramps and dyspnea on exertion.  has lower extremity edema.  will get xray, troponin, ekg, to assess for acs.  will get cpk to screen for rhabdomyolysis.

## 2024-04-25 NOTE — H&P ADULT - PROBLEM SELECTOR PLAN 2
- Elevated liver enzymes possibly i/s/o recent NSTEMI vs i/s/o DILI d/t statin therapy  - Hold statin for now  - Monitor/trend liver enzymes  - Avoid hepatotoxic medications

## 2024-04-25 NOTE — H&P ADULT - NSHPLABSRESULTS_GEN_ALL_CORE
LABS;                      12.7   15.09 )-----------( 531      ( 25 Apr 2024 19:07 )             40.5     04-25    133<L>  |  98  |  61<H>  ----------------------------<  184<H>  4.8   |  21<L>  |  1.92<H>    Ca    9.6      25 Apr 2024 19:07  Mg     2.7     04-25    TPro  6.9  /  Alb  3.6  /  TBili  1.4<H>  /  DBili  x   /  AST  1332<H>  /  ALT  707<H>  /  AlkPhos  123<H>  04-25    CARDIAC MARKERS ( 25 Apr 2024 19:07 )  x     / x     / 29600 U/L / x     / x        LIVER FUNCTIONS - ( 25 Apr 2024 19:07 )  Alb: 3.6 g/dL / Pro: 6.9 g/dL / ALK PHOS: 123 U/L / ALT: 707 U/L / AST: 1332 U/L / GGT: x           Urinalysis Basic - ( 25 Apr 2024 19:07 )  Color: x / Appearance: x / SG: x / pH: x  Gluc: 184 mg/dL / Ketone: x  / Bili: x / Urobili: x   Blood: x / Protein: x / Nitrite: x   Leuk Esterase: x / RBC: x / WBC x   Sq Epi: x / Non Sq Epi: x / Bacteria: x    IMAGING:  Xray Chest 1 View- PORTABLE-Urgent (04.25.24 @ 19:25)  IMPRESSION:  - No evidence of acute pulmonary disease.  ******PRELIMINARY REPORT******

## 2024-04-25 NOTE — H&P ADULT - ASSESSMENT
70yo M w/ PMH of HTN, HLD, DM2, CAD s/p 10 stents (last 1 week ago), Afib on Eliquis, HFrEF, CKD III pw LE pain/cramping found to have CK >28K w/ elevated liver enzymes likely i/s/o statin induced rhabdo and DILI

## 2024-04-25 NOTE — H&P ADULT - PROBLEM SELECTOR PLAN 5
- TTE w/ EF 47% in January 2024  - c/w home Farxiga 10mg qd  - c/w home Valsartan 40mg qd   - c/w home Lopressor 25mg BID  - c/w home Lasix 40mg QD (not in VOL)

## 2024-04-26 DIAGNOSIS — K71.9 TOXIC LIVER DISEASE, UNSPECIFIED: ICD-10-CM

## 2024-04-26 DIAGNOSIS — I48.20 CHRONIC ATRIAL FIBRILLATION, UNSPECIFIED: ICD-10-CM

## 2024-04-26 DIAGNOSIS — N18.30 CHRONIC KIDNEY DISEASE, STAGE 3 UNSPECIFIED: ICD-10-CM

## 2024-04-26 DIAGNOSIS — I21.4 NON-ST ELEVATION (NSTEMI) MYOCARDIAL INFARCTION: ICD-10-CM

## 2024-04-26 DIAGNOSIS — I10 ESSENTIAL (PRIMARY) HYPERTENSION: ICD-10-CM

## 2024-04-26 DIAGNOSIS — E78.5 HYPERLIPIDEMIA, UNSPECIFIED: ICD-10-CM

## 2024-04-26 DIAGNOSIS — Z29.9 ENCOUNTER FOR PROPHYLACTIC MEASURES, UNSPECIFIED: ICD-10-CM

## 2024-04-26 DIAGNOSIS — M62.82 RHABDOMYOLYSIS: ICD-10-CM

## 2024-04-26 DIAGNOSIS — E11.9 TYPE 2 DIABETES MELLITUS WITHOUT COMPLICATIONS: ICD-10-CM

## 2024-04-26 DIAGNOSIS — I50.22 CHRONIC SYSTOLIC (CONGESTIVE) HEART FAILURE: ICD-10-CM

## 2024-04-26 LAB
ALBUMIN SERPL ELPH-MCNC: 2.5 G/DL — LOW (ref 3.3–5)
ALP SERPL-CCNC: 95 U/L — SIGNIFICANT CHANGE UP (ref 40–120)
ALT FLD-CCNC: 611 U/L — HIGH (ref 10–45)
ANION GAP SERPL CALC-SCNC: 12 MMOL/L — SIGNIFICANT CHANGE UP (ref 5–17)
APPEARANCE UR: CLEAR — SIGNIFICANT CHANGE UP
AST SERPL-CCNC: 1112 U/L — HIGH (ref 10–40)
BACTERIA # UR AUTO: NEGATIVE /HPF — SIGNIFICANT CHANGE UP
BASOPHILS # BLD AUTO: 0.04 K/UL — SIGNIFICANT CHANGE UP (ref 0–0.2)
BASOPHILS NFR BLD AUTO: 0.3 % — SIGNIFICANT CHANGE UP (ref 0–2)
BILIRUB SERPL-MCNC: 1 MG/DL — SIGNIFICANT CHANGE UP (ref 0.2–1.2)
BILIRUB UR-MCNC: NEGATIVE — SIGNIFICANT CHANGE UP
BUN SERPL-MCNC: 60 MG/DL — HIGH (ref 7–23)
CALCIUM SERPL-MCNC: 8.7 MG/DL — SIGNIFICANT CHANGE UP (ref 8.4–10.5)
CAST: 4 /LPF — SIGNIFICANT CHANGE UP (ref 0–4)
CHLORIDE SERPL-SCNC: 100 MMOL/L — SIGNIFICANT CHANGE UP (ref 96–108)
CK SERPL-CCNC: CRITICAL HIGH U/L (ref 30–200)
CO2 SERPL-SCNC: 20 MMOL/L — LOW (ref 22–31)
COLOR SPEC: ABNORMAL
CREAT SERPL-MCNC: 1.76 MG/DL — HIGH (ref 0.5–1.3)
DIFF PNL FLD: ABNORMAL
EGFR: 42 ML/MIN/1.73M2 — LOW
EOSINOPHIL # BLD AUTO: 0.54 K/UL — HIGH (ref 0–0.5)
EOSINOPHIL NFR BLD AUTO: 4.1 % — SIGNIFICANT CHANGE UP (ref 0–6)
GLUCOSE BLDC GLUCOMTR-MCNC: 152 MG/DL — HIGH (ref 70–99)
GLUCOSE BLDC GLUCOMTR-MCNC: 265 MG/DL — HIGH (ref 70–99)
GLUCOSE BLDC GLUCOMTR-MCNC: 289 MG/DL — HIGH (ref 70–99)
GLUCOSE BLDC GLUCOMTR-MCNC: 295 MG/DL — HIGH (ref 70–99)
GLUCOSE SERPL-MCNC: 146 MG/DL — HIGH (ref 70–99)
GLUCOSE UR QL: >=1000 MG/DL
HCT VFR BLD CALC: 34.7 % — LOW (ref 39–50)
HGB BLD-MCNC: 10.6 G/DL — LOW (ref 13–17)
IMM GRANULOCYTES NFR BLD AUTO: 0.5 % — SIGNIFICANT CHANGE UP (ref 0–0.9)
KETONES UR-MCNC: NEGATIVE MG/DL — SIGNIFICANT CHANGE UP
LEUKOCYTE ESTERASE UR-ACNC: NEGATIVE — SIGNIFICANT CHANGE UP
LYMPHOCYTES # BLD AUTO: 0.8 K/UL — LOW (ref 1–3.3)
LYMPHOCYTES # BLD AUTO: 6.1 % — LOW (ref 13–44)
MAGNESIUM SERPL-MCNC: 2.6 MG/DL — SIGNIFICANT CHANGE UP (ref 1.6–2.6)
MCHC RBC-ENTMCNC: 27.2 PG — SIGNIFICANT CHANGE UP (ref 27–34)
MCHC RBC-ENTMCNC: 30.5 GM/DL — LOW (ref 32–36)
MCV RBC AUTO: 89.2 FL — SIGNIFICANT CHANGE UP (ref 80–100)
MONOCYTES # BLD AUTO: 0.9 K/UL — SIGNIFICANT CHANGE UP (ref 0–0.9)
MONOCYTES NFR BLD AUTO: 6.9 % — SIGNIFICANT CHANGE UP (ref 2–14)
NEUTROPHILS # BLD AUTO: 10.74 K/UL — HIGH (ref 1.8–7.4)
NEUTROPHILS NFR BLD AUTO: 82.1 % — HIGH (ref 43–77)
NITRITE UR-MCNC: NEGATIVE — SIGNIFICANT CHANGE UP
NRBC # BLD: 0 /100 WBCS — SIGNIFICANT CHANGE UP (ref 0–0)
PH UR: 5.5 — SIGNIFICANT CHANGE UP (ref 5–8)
PHOSPHATE SERPL-MCNC: 4 MG/DL — SIGNIFICANT CHANGE UP (ref 2.5–4.5)
PLATELET # BLD AUTO: 402 K/UL — HIGH (ref 150–400)
POTASSIUM SERPL-MCNC: 4.4 MMOL/L — SIGNIFICANT CHANGE UP (ref 3.5–5.3)
POTASSIUM SERPL-SCNC: 4.4 MMOL/L — SIGNIFICANT CHANGE UP (ref 3.5–5.3)
PROT SERPL-MCNC: 5.2 G/DL — LOW (ref 6–8.3)
PROT UR-MCNC: 100 MG/DL
RBC # BLD: 3.89 M/UL — LOW (ref 4.2–5.8)
RBC # FLD: 16.4 % — HIGH (ref 10.3–14.5)
RBC CASTS # UR COMP ASSIST: 0 /HPF — SIGNIFICANT CHANGE UP (ref 0–4)
REVIEW: SIGNIFICANT CHANGE UP
SODIUM SERPL-SCNC: 132 MMOL/L — LOW (ref 135–145)
SP GR SPEC: 1.01 — SIGNIFICANT CHANGE UP (ref 1–1.03)
SQUAMOUS # UR AUTO: 1 /HPF — SIGNIFICANT CHANGE UP (ref 0–5)
UROBILINOGEN FLD QL: 1 MG/DL — SIGNIFICANT CHANGE UP (ref 0.2–1)
WBC # BLD: 13.09 K/UL — HIGH (ref 3.8–10.5)
WBC # FLD AUTO: 13.09 K/UL — HIGH (ref 3.8–10.5)
WBC UR QL: 1 /HPF — SIGNIFICANT CHANGE UP (ref 0–5)

## 2024-04-26 PROCEDURE — 76700 US EXAM ABDOM COMPLETE: CPT | Mod: 26

## 2024-04-26 PROCEDURE — 93925 LOWER EXTREMITY STUDY: CPT | Mod: 26

## 2024-04-26 RX ORDER — DEXTROSE 50 % IN WATER 50 %
12.5 SYRINGE (ML) INTRAVENOUS ONCE
Refills: 0 | Status: DISCONTINUED | OUTPATIENT
Start: 2024-04-26 | End: 2024-05-02

## 2024-04-26 RX ORDER — ACETAMINOPHEN 500 MG
650 TABLET ORAL EVERY 6 HOURS
Refills: 0 | Status: DISCONTINUED | OUTPATIENT
Start: 2024-04-26 | End: 2024-05-02

## 2024-04-26 RX ORDER — INSULIN LISPRO 100/ML
7 VIAL (ML) SUBCUTANEOUS
Refills: 0 | Status: DISCONTINUED | OUTPATIENT
Start: 2024-04-26 | End: 2024-04-27

## 2024-04-26 RX ORDER — DEXTROSE 10 % IN WATER 10 %
125 INTRAVENOUS SOLUTION INTRAVENOUS ONCE
Refills: 0 | Status: DISCONTINUED | OUTPATIENT
Start: 2024-04-26 | End: 2024-05-02

## 2024-04-26 RX ORDER — APIXABAN 2.5 MG/1
2.5 TABLET, FILM COATED ORAL EVERY 12 HOURS
Refills: 0 | Status: DISCONTINUED | OUTPATIENT
Start: 2024-04-26 | End: 2024-04-30

## 2024-04-26 RX ORDER — CLOPIDOGREL BISULFATE 75 MG/1
75 TABLET, FILM COATED ORAL DAILY
Refills: 0 | Status: DISCONTINUED | OUTPATIENT
Start: 2024-04-26 | End: 2024-05-02

## 2024-04-26 RX ORDER — SODIUM CHLORIDE 9 MG/ML
1000 INJECTION, SOLUTION INTRAVENOUS
Refills: 0 | Status: DISCONTINUED | OUTPATIENT
Start: 2024-04-26 | End: 2024-04-26

## 2024-04-26 RX ORDER — SODIUM CHLORIDE 9 MG/ML
1000 INJECTION, SOLUTION INTRAVENOUS
Refills: 0 | Status: DISCONTINUED | OUTPATIENT
Start: 2024-04-26 | End: 2024-05-02

## 2024-04-26 RX ORDER — INSULIN GLARGINE 100 [IU]/ML
15 INJECTION, SOLUTION SUBCUTANEOUS AT BEDTIME
Refills: 0 | Status: DISCONTINUED | OUTPATIENT
Start: 2024-04-26 | End: 2024-04-27

## 2024-04-26 RX ORDER — ASPIRIN/CALCIUM CARB/MAGNESIUM 324 MG
81 TABLET ORAL DAILY
Refills: 0 | Status: DISCONTINUED | OUTPATIENT
Start: 2024-04-26 | End: 2024-04-29

## 2024-04-26 RX ORDER — ISOSORBIDE DINITRATE 5 MG/1
30 TABLET ORAL THREE TIMES A DAY
Refills: 0 | Status: DISCONTINUED | OUTPATIENT
Start: 2024-04-26 | End: 2024-05-02

## 2024-04-26 RX ORDER — FUROSEMIDE 40 MG
40 TABLET ORAL DAILY
Refills: 0 | Status: DISCONTINUED | OUTPATIENT
Start: 2024-04-26 | End: 2024-04-26

## 2024-04-26 RX ORDER — CHLORHEXIDINE GLUCONATE 213 G/1000ML
1 SOLUTION TOPICAL DAILY
Refills: 0 | Status: DISCONTINUED | OUTPATIENT
Start: 2024-04-26 | End: 2024-05-02

## 2024-04-26 RX ORDER — INSULIN LISPRO 100/ML
VIAL (ML) SUBCUTANEOUS
Refills: 0 | Status: DISCONTINUED | OUTPATIENT
Start: 2024-04-26 | End: 2024-05-02

## 2024-04-26 RX ORDER — VALSARTAN 80 MG/1
40 TABLET ORAL DAILY
Refills: 0 | Status: DISCONTINUED | OUTPATIENT
Start: 2024-04-26 | End: 2024-04-26

## 2024-04-26 RX ORDER — SODIUM CHLORIDE 9 MG/ML
1000 INJECTION INTRAMUSCULAR; INTRAVENOUS; SUBCUTANEOUS
Refills: 0 | Status: DISCONTINUED | OUTPATIENT
Start: 2024-04-26 | End: 2024-04-29

## 2024-04-26 RX ORDER — FUROSEMIDE 40 MG
40 TABLET ORAL DAILY
Refills: 0 | Status: DISCONTINUED | OUTPATIENT
Start: 2024-04-26 | End: 2024-04-30

## 2024-04-26 RX ORDER — INFLUENZA VIRUS VACCINE 15; 15; 15; 15 UG/.5ML; UG/.5ML; UG/.5ML; UG/.5ML
0.7 SUSPENSION INTRAMUSCULAR ONCE
Refills: 0 | Status: COMPLETED | OUTPATIENT
Start: 2024-04-26 | End: 2024-04-26

## 2024-04-26 RX ORDER — DEXTROSE 50 % IN WATER 50 %
25 SYRINGE (ML) INTRAVENOUS ONCE
Refills: 0 | Status: DISCONTINUED | OUTPATIENT
Start: 2024-04-26 | End: 2024-05-02

## 2024-04-26 RX ORDER — METOPROLOL TARTRATE 50 MG
25 TABLET ORAL
Refills: 0 | Status: DISCONTINUED | OUTPATIENT
Start: 2024-04-26 | End: 2024-05-02

## 2024-04-26 RX ORDER — GLUCAGON INJECTION, SOLUTION 0.5 MG/.1ML
1 INJECTION, SOLUTION SUBCUTANEOUS ONCE
Refills: 0 | Status: DISCONTINUED | OUTPATIENT
Start: 2024-04-26 | End: 2024-05-02

## 2024-04-26 RX ORDER — DAPAGLIFLOZIN 10 MG/1
10 TABLET, FILM COATED ORAL DAILY
Refills: 0 | Status: DISCONTINUED | OUTPATIENT
Start: 2024-04-26 | End: 2024-04-26

## 2024-04-26 RX ORDER — DEXTROSE 50 % IN WATER 50 %
15 SYRINGE (ML) INTRAVENOUS ONCE
Refills: 0 | Status: DISCONTINUED | OUTPATIENT
Start: 2024-04-26 | End: 2024-05-02

## 2024-04-26 RX ORDER — INSULIN LISPRO 100/ML
VIAL (ML) SUBCUTANEOUS AT BEDTIME
Refills: 0 | Status: DISCONTINUED | OUTPATIENT
Start: 2024-04-26 | End: 2024-05-02

## 2024-04-26 RX ADMIN — VALSARTAN 40 MILLIGRAM(S): 80 TABLET ORAL at 06:13

## 2024-04-26 RX ADMIN — Medication 81 MILLIGRAM(S): at 11:55

## 2024-04-26 RX ADMIN — Medication 40 MILLIGRAM(S): at 06:13

## 2024-04-26 RX ADMIN — ISOSORBIDE DINITRATE 30 MILLIGRAM(S): 5 TABLET ORAL at 06:14

## 2024-04-26 RX ADMIN — SODIUM CHLORIDE 100 MILLILITER(S): 9 INJECTION INTRAMUSCULAR; INTRAVENOUS; SUBCUTANEOUS at 12:01

## 2024-04-26 RX ADMIN — SODIUM CHLORIDE 100 MILLILITER(S): 9 INJECTION INTRAMUSCULAR; INTRAVENOUS; SUBCUTANEOUS at 14:27

## 2024-04-26 RX ADMIN — CHLORHEXIDINE GLUCONATE 1 APPLICATION(S): 213 SOLUTION TOPICAL at 12:01

## 2024-04-26 RX ADMIN — SODIUM CHLORIDE 100 MILLILITER(S): 9 INJECTION, SOLUTION INTRAVENOUS at 02:48

## 2024-04-26 RX ADMIN — Medication 25 MILLIGRAM(S): at 06:13

## 2024-04-26 RX ADMIN — ISOSORBIDE DINITRATE 30 MILLIGRAM(S): 5 TABLET ORAL at 11:55

## 2024-04-26 RX ADMIN — Medication 40 MILLIGRAM(S): at 11:55

## 2024-04-26 RX ADMIN — Medication 3: at 17:12

## 2024-04-26 RX ADMIN — Medication 3: at 14:27

## 2024-04-26 RX ADMIN — Medication 650 MILLIGRAM(S): at 18:37

## 2024-04-26 RX ADMIN — Medication 1: at 08:06

## 2024-04-26 RX ADMIN — APIXABAN 2.5 MILLIGRAM(S): 2.5 TABLET, FILM COATED ORAL at 06:13

## 2024-04-26 RX ADMIN — INSULIN GLARGINE 15 UNIT(S): 100 INJECTION, SOLUTION SUBCUTANEOUS at 22:33

## 2024-04-26 RX ADMIN — Medication 650 MILLIGRAM(S): at 18:50

## 2024-04-26 RX ADMIN — CLOPIDOGREL BISULFATE 75 MILLIGRAM(S): 75 TABLET, FILM COATED ORAL at 11:54

## 2024-04-26 RX ADMIN — Medication 1: at 22:34

## 2024-04-26 RX ADMIN — Medication 7 UNIT(S): at 17:11

## 2024-04-26 RX ADMIN — ISOSORBIDE DINITRATE 30 MILLIGRAM(S): 5 TABLET ORAL at 18:34

## 2024-04-26 RX ADMIN — Medication 7 UNIT(S): at 08:06

## 2024-04-26 RX ADMIN — Medication 25 MILLIGRAM(S): at 18:34

## 2024-04-26 RX ADMIN — Medication 7 UNIT(S): at 14:26

## 2024-04-26 RX ADMIN — APIXABAN 2.5 MILLIGRAM(S): 2.5 TABLET, FILM COATED ORAL at 18:34

## 2024-04-26 NOTE — CONSULT NOTE ADULT - ASSESSMENT
68yo M w/ PMH of HTN, HLD, DM2, CAD s/p 10 stents (last 1 week ago), Afib on Eliquis, HFrEF, CKD III pw LE pain/cramping found to have CK >28K w/ elevated liver enzymes statin induced rhabdo.  Atorvastatin on hold.  Patient cannot get a statin again.  Would need to treat hypercholesterolemia with PCSK9.    Continue hydration.  Watch for signs of CHF.  Monitor CPK and LFT's.  Already started to come down.  For chest pain, consult Dr. Balta Ibarra for a possible intervention.  On eliquis for AF which is chronic  HR is well controlled  Abd ultrasound to evaluate liver  Will follow with you.

## 2024-04-26 NOTE — PATIENT PROFILE ADULT - FALL HARM RISK - HARM RISK INTERVENTIONS

## 2024-04-26 NOTE — PATIENT PROFILE ADULT - FUNCTIONAL ASSESSMENT - DAILY ACTIVITY 6.
[phone] (home)   Spoke with Shahram Castaneda, advised of results, mom verbalized understanding 4 = No assist / stand by assistance

## 2024-04-26 NOTE — CONSULT NOTE ADULT - SUBJECTIVE AND OBJECTIVE BOX
HPI:  Pt is a 70yo M w/ PMH of HTN, HLD, DM2, CAD s/p 10 stents (last 1 week ago), Afib on Eliquis, HFrEF, CKD III pw LE pain/cramping.    Pt recently admitted - for NSTEMI s/p C w/ stent placement. He now reports b/l LE pain, stiffness and cramping w/ difficulty ambulating or even crossing legs for the past 7-8 days since discharge from the hospital. He also now reports dark urine.     He also reports some mild pressure like CP since discharge for which he followed up w/ his cardiologist outpt and noted to be likely i/s/o recent cath and to c/w Isosorbide.     Today he went to PMD and i/s/o weakness in LE and tachycardia, he was advised to present to the ED.    In the ED VSS w/ labs notable for leukocytosis to 15 and Cr 1.92 (~baseline) w/ elevated liver enzymes, trop 505 -> 506[place] [address]. He was administered 2L IVF.      (2024 23:00)      Patient has history of diabetes, A1C : 7.6 % on home semglee and donnie  Endo was consulted for glycemic control.      PAST MEDICAL & SURGICAL HISTORY:  Former smoker      CAD in native artery      Type 2 diabetes mellitus      Hyperlipidemia, unspecified hyperlipidemia type      Essential hypertension, hypertension with unspecified goal      Afib      Hematoma of leg      Stented coronary artery      CHF (congestive heart failure)      s/p Carotid Endarterectomy  left          FAMILY HISTORY:  Family history of heart disease  father  age 71    FH: atrial fibrillation  sister        Social History:  - Denies tobacco use  - Denies EtOH consumption  - Denies illicit drug use (2024 23:00)            HOME MEDICATIONS:  Home Medications:  atorvastatin 80 mg oral tablet: 1 tab(s) orally once a day (at bedtime) (2024 01:47)  clopidogrel 75 mg oral tablet: 1 tab(s) orally once a day (2024 01:47)  ezetimibe 10 mg oral tablet: 1 tab(s) orally once a day (:47)  Farxiga 10 mg oral tablet: 1 tab(s) orally once a day (:47)  furosemide 40 mg oral tablet: 1 tab(s) orally once a day (2024 01:47)  multivitamin gummies: 1 gummy orally once a day (2024 01:47)  Semglee (Prefilled Pen) 100 units/mL subcutaneous solution: 28-30 units once a day (:47)  valsartan 40 mg oral tablet: 1 tab(s) orally once a day (2024 01:47)            MEDICATIONS  (STANDING):  apixaban 2.5 milliGRAM(s) Oral every 12 hours  aspirin enteric coated 81 milliGRAM(s) Oral daily  chlorhexidine 2% Cloths 1 Application(s) Topical daily  clopidogrel Tablet 75 milliGRAM(s) Oral daily  dextrose 10% Bolus 125 milliLiter(s) IV Bolus once  dextrose 5%. 1000 milliLiter(s) (50 mL/Hr) IV Continuous <Continuous>  dextrose 5%. 1000 milliLiter(s) (100 mL/Hr) IV Continuous <Continuous>  dextrose 50% Injectable 12.5 Gram(s) IV Push once  dextrose 50% Injectable 25 Gram(s) IV Push once  furosemide   Injectable 40 milliGRAM(s) IV Push daily  glucagon  Injectable 1 milliGRAM(s) IntraMuscular once  influenza  Vaccine (HIGH DOSE) 0.7 milliLiter(s) IntraMuscular once  insulin glargine Injectable (LANTUS) 15 Unit(s) SubCutaneous at bedtime  insulin lispro (ADMELOG) corrective regimen sliding scale   SubCutaneous three times a day before meals  insulin lispro (ADMELOG) corrective regimen sliding scale   SubCutaneous at bedtime  insulin lispro Injectable (ADMELOG) 7 Unit(s) SubCutaneous three times a day before meals  isosorbide   dinitrate Tablet (ISORDIL) 30 milliGRAM(s) Oral three times a day  metoprolol tartrate 25 milliGRAM(s) Oral two times a day  sodium chloride 0.9%. 1000 milliLiter(s) (100 mL/Hr) IV Continuous <Continuous>    MEDICATIONS  (PRN):  acetaminophen     Tablet .. 650 milliGRAM(s) Oral every 6 hours PRN Temp greater or equal to 38C (100.4F), Mild Pain (1 - 3)  dextrose Oral Gel 15 Gram(s) Oral once PRN Blood Glucose LESS THAN 70 milliGRAM(s)/deciliter      Allergies    No Known Allergies    Intolerances        Review of Systems:  Neuro: No HA, no dizziness  Cardiovascular: No chest pain, no palpitations  Respiratory: no SOB, no cough  GI: No nausea, vomiting, abdominal pain  MSK: Denies joint/muscle pain      ALL OTHER SYSTEMS REVIEWED AND NEGATIVE        PHYSICAL EXAM:  VITALS: T(C): 36.5 (24 @ 11:36)  T(F): 97.7 (24 @ 11:36), Max: 97.7 (24 @ 04:28)  HR: 87 (24 @ 11:36) (80 - 106)  BP: 116/64 (24 @ 11:36) (107/78 - 146/74)  RR:  (18 - 20)  SpO2:  (98% - 100%)  Wt(kg): --  GENERAL: NAD, well-groomed, well-developed  NEURO:  alert and oriented  RESPIRATORY: Clear to auscultation bilaterally; No rales, rhonchi, wheezing  CARDIOVASCULAR: Si S2  GI: Soft, non distended, normal bowel sounds  MUSCULOSKELETAL: Moves all extremities equally       POCT Blood Glucose.: 295 mg/dL (24 @ 14:25)  POCT Blood Glucose.: 152 mg/dL (24 @ 07:58)                            10.6   13.09 )-----------( 402      ( 2024 06:25 )             34.7           132<L>  |  100  |  60<H>  ----------------------------<  146<H>  4.4   |  20<L>  |  1.76<H>    eGFR: 42<L>    Ca    8.7        Mg     2.6       Phos  4.0         TPro  5.2<L>  /  Alb  2.5<L>  /  TBili  1.0  /  DBili  x   /  AST  1112<H>  /  ALT  611<H>  /  AlkPhos  95        Thyroid Function Tests:    Diet, DASH/TLC:   Sodium & Cholesterol Restricted  Consistent Carbohydrate No Snacks (CSTCHO) (24 @ 01:46) [Active]         Chol 90 Direct LDL -- LDL calculated 36 HDL 40<L> Trig 61  A1C with Estimated Average Glucose Result: 7.6 % (24 @ 06:46)  A1C with Estimated Average Glucose Result: 7.7 % (24 @ 03:52)  A1C with Estimated Average Glucose Result: 6.7 % (24 @ 02:59)  A1C with Estimated Average Glucose Result: 6.6 % (23 @ 05:27)  A1C with Estimated Average Glucose Result: 8.4 % (23 @ 07:28)

## 2024-04-26 NOTE — CONSULT NOTE ADULT - SUBJECTIVE AND OBJECTIVE BOX
HPI: Mr. Velázquez is a 69 year-old man with history of multiple medical issues including hypertension, type 2 diabetes mellitus, coronary artery disease s/p 10 stents, atrial fibrillation, congestive heart failure with reduced EF, and chronic kidney disease. He is s/p admission at [facility] -24 when he underwent stent placement. He returned to the [facility] ER overnight with bilateral leg pain, stiffness, and weakness ever since discharge, as well as dark urine for 1-2 days. In the ER, [place] [address]. He received 2L of IVF in the ER, and he is now on NS 100cc/h.    Home meds included Lipitor 80qd, Zetia 10qd, Farxiga 10qd, Lasix 40qd, and Valsartan 40qd.      PAST MEDICAL & SURGICAL HISTORY:  HTN  HLD  DM2  CAD - stent  HFpEF  AFib  CKD  Carotid stenosis - L CEA    Allergies  No Known Allergies    SOCIAL HISTORY:  Denies ETOh,Smoking,     FAMILY HISTORY:  Family history of heart disease -father  age 71  FH: atrial fibrillation -sister    REVIEW OF SYSTEMS:  CONSTITUTIONAL: No fevers or chills  EYES/ENT: No visual changes;  No vertigo or throat pain   NECK: No pain or stiffness  RESPIRATORY: No cough, wheezing, hemoptysis; No shortness of breath  CARDIOVASCULAR: No chest pain or palpitations  GASTROINTESTINAL: No abdominal or epigastric pain. No nausea, vomiting, or hematemesis; No diarrhea or constipation. No melena or hematochezia.  GENITOURINARY: (+)dark urine  MUSCULOSKELETAL: (+)b/l LE pain, stiffness, and weakness  NEUROLOGICAL: No numbness or tingling  SKIN: No itching, burning, rashes, or lesions   All other review of systems is negative unless indicated above.    VITAL:  T(C): , Max: 36.5 (24 @ 04:28)  T(F): , Max: 97.7 (24 @ 04:28)  HR: 87 (24 @ 11:36)  BP: 116/64 (24 @ 11:36)  RR: 18 (24 @ 11:36)  SpO2: 100% (24 @ 11:36)  Wt(kg): --    PHYSICAL EXAM:  Constitutional: NAD, Alert  HEENT: NCAT, MMM  Neck: Supple, No JVD  Respiratory: CTA-b/l  Cardiovascular: RRR s1s2, no m/r/g  Gastrointestinal: BS+, soft, NT/ND  Extremities: No peripheral edema b/l  Neurological: no focal deficits; strength grossly intact  Back: no CVAT b/l  Skin: No rashes, no nevi    LABS:                        10.6   13.09 )-----------( 402      ( 2024 06:25 )             34.7     Na(132)/K(4.4)/Cl(100)/HCO3(20)/BUN(60)/Cr(1.76)Glu(146)/Ca(8.7)/Mg(2.6)/PO4(4.0)     @ 06:25  Na(133)/K(4.8)/Cl(98)/HCO3(21)/BUN(61)/Cr(1.92)Glu(184)/Ca(9.6)/Mg(2.7)/PO4(--)     @ 19:07    IMAGING:  < from: Xray Chest 1 View- PORTABLE-Urgent (24 @ 19:25) >  No evidence of acute pulmonary disease.      ASSESSMENT:  (1)Renal - CKD3-4 - due to diabetes/hypertension - at/near baseline  (2)Rhabdo - likely triggers by meds being used for hyperlipidemia - he is off them for now - on NS 100cc/h  (3)CV - at risk for fluid overload from IVF required here for treatment of rhabdo - I agree with use of both IVF and IV diuretics here to drive down the CPK while minimizing risk of associated fluid overload    RECOMMEND:  (1)NS 100cc/h as ordered  (2)Lasix 40mg iv qd as ordered  (3)CPK at least daily for now  (4)No Zetia/No Lipitor  (5)BMP+Mg daily  (6)Dose new meds for GFR 30-40ml/min    Thank you for involving [facility] in this patient's care.    With warm regards,    Markell Walton MD   [facility]  Office: [phone]  Cell: [phone]

## 2024-04-26 NOTE — PROGRESS NOTE ADULT - SUBJECTIVE AND OBJECTIVE BOX
date of service: 24 @ 08:45  [facility]  REVIEW OF SYSTEMS:  CONSTITUTIONAL: No fever,  no  weight loss  ENT:  No  tinnitus,   no   vertigo  NECK: No pain or stiffness  RESPIRATORY: No cough, wheezing, chills or hemoptysis;    No Shortness of Breath  CARDIOVASCULAR: No chest pain, palpitations, dizziness  GASTROINTESTINAL: No abdominal or epigastric pain. No nausea, vomiting, or hematemesis; No diarrhea  No melena or hematochezia.  GENITOURINARY: No dysuria, frequency, hematuria, or incontinence  NEUROLOGICAL: No headaches  SKIN: No itching,  no   rash  LYMPH Nodes: No enlarged glands  ENDOCRINE: No heat or cold intolerance  MUSCULOSKELETAL: No joint pain or swelling  PSYCHIATRIC: No depression, anxiety  HEME/LYMPH: No easy bruising, or bleeding gums  ALLERGY AND IMMUNOLOGIC: No hives or eczema	    MEDICATIONS  (STANDING):  apixaban 2.5 milliGRAM(s) Oral every 12 hours  aspirin enteric coated 81 milliGRAM(s) Oral daily  clopidogrel Tablet 75 milliGRAM(s) Oral daily  dapagliflozin 10 milliGRAM(s) Oral daily  dextrose 10% Bolus 125 milliLiter(s) IV Bolus once  dextrose 5%. 1000 milliLiter(s) (100 mL/Hr) IV Continuous <Continuous>  dextrose 5%. 1000 milliLiter(s) (50 mL/Hr) IV Continuous <Continuous>  dextrose 50% Injectable 12.5 Gram(s) IV Push once  dextrose 50% Injectable 25 Gram(s) IV Push once  furosemide    Tablet 40 milliGRAM(s) Oral daily  glucagon  Injectable 1 milliGRAM(s) IntraMuscular once  influenza  Vaccine (HIGH DOSE) 0.7 milliLiter(s) IntraMuscular once  insulin glargine Injectable (LANTUS) 15 Unit(s) SubCutaneous at bedtime  insulin lispro (ADMELOG) corrective regimen sliding scale   SubCutaneous three times a day before meals  insulin lispro (ADMELOG) corrective regimen sliding scale   SubCutaneous at bedtime  insulin lispro Injectable (ADMELOG) 7 Unit(s) SubCutaneous three times a day before meals  isosorbide   dinitrate Tablet (ISORDIL) 30 milliGRAM(s) Oral three times a day  lactated ringers. 1000 milliLiter(s) (100 mL/Hr) IV Continuous <Continuous>  metoprolol tartrate 25 milliGRAM(s) Oral two times a day  valsartan 40 milliGRAM(s) Oral daily    MEDICATIONS  (PRN):  acetaminophen     Tablet .. 650 milliGRAM(s) Oral every 6 hours PRN Temp greater or equal to 38C (100.4F), Mild Pain (1 - 3)  dextrose Oral Gel 15 Gram(s) Oral once PRN Blood Glucose LESS THAN 70 milliGRAM(s)/deciliter      Vital Signs Last 24 Hrs  T(C): 36.5 (2024 04:28), Max: 36.5 (2024 04:28)  T(F): 97.7 (2024 04:28), Max: 97.7 (2024 04:28)  HR: 82 (:28) (80 - 106)  BP: 120/75 (:28) (107/78 - 146/74)  BP(mean): --  RR: 18 (2024 04:28) (18 - 20)  SpO2: 99% (:28) (98% - 100%)    Parameters below as of 2024 04:28  Patient On (Oxygen Delivery Method): nasal cannula  O2 Flow (L/min): 2    CAPILLARY BLOOD GLUCOSE      POCT Blood Glucose.: 152 mg/dL (2024 07:58)    I&O's Summary    2024 07:01  -  2024 07:00  --------------------------------------------------------  IN: 100 mL / OUT: 500 mL / NET: -400 mL          Appearance: Normal	  HEENT:   Normal oral mucosa, PERRL, EOMI	  Lymphatic: No lymphadenopathy  Cardiovascular: Normal S1 S2, No JVD  Respiratory: Lungs clear to auscultation	  Gastrointestinal:  Soft, Non-tender, + BS	  Skin: No rash, No ecchymoses	  Extremities:     LABS:                        10.6   13.09 )-----------( 402      ( 2024 06:25 )             34.7     04-26    132<L>  |  100  |  60<H>  ----------------------------<  146<H>  4.4   |  20<L>  |  1.76<H>    Ca    8.7      2024 06:25  Phos  4.0       Mg     2.6         TPro  5.2<L>  /  Alb  2.5<L>  /  TBili  1.0  /  DBili  x   /  AST  1112<H>  /  ALT  611<H>  /  AlkPhos  95        CARDIAC MARKERS ( 2024 06:25 )  x     / x     / 45504 U/L / x     / x      CARDIAC MARKERS ( 2024 19:07 )  x     / x     / 09680 U/L / x     / x          Urinalysis Basic - ( 2024 07:13 )    Color: Orange / Appearance: Clear / S.015 / pH: x  Gluc: x / Ketone: Negative mg/dL  / Bili: Negative / Urobili: 1.0 mg/dL   Blood: x / Protein: 100 mg/dL / Nitrite: Negative   Leuk Esterase: Negative / RBC: 0 /HPF / WBC 1 /HPF   Sq Epi: x / Non Sq Epi: 1 /HPF / Bacteria: Negative /HPF              Thyroid Stimulating Hormone, Serum: 1.76 uIU/mL (02-10 @ 07:24)          Consultant(s) Notes Reviewed:      Care Discussed with Consultants/Other Providers:

## 2024-04-26 NOTE — CONSULT NOTE ADULT - SUBJECTIVE AND OBJECTIVE BOX
CHIEF COMPLAINT: Chest Pain    HPI:  Pt is a 70yo M w/ PMH of HTN, HLD, DM2, CAD s/p 10 stents (last 1 week ago), Afib on Eliquis, HFrEF, CKD III pw LE pain/cramping.    Pt recently admitted - for NSTEMI s/p C w/ stent placement. He now reports b/l LE pain, stiffness and cramping w/ difficulty ambulating or even crossing legs for the past 7-8 days since discharge from the hospital. He also now reports dark urine.     He also reports some mild pressure like CP since discharge for which he followed up w/ his cardiologist outpt and noted to be likely i/s/o recent cath and to c/w Isosorbide.     Yesterday he went to PMD and i/s/o weakness in LE and tachycardia, he was advised to present to the ED.    In the ED VSS w/ labs notable for leukocytosis to 15 and Cr 1.92 (~baseline) w/ elevated liver enzymes, trop 505 -> 506[place] [address]. He was administered 2L IVF.      (2024 23:00)  Patient reports feeling better this morning.  Currently no chest pain at rest.      PAST MEDICAL & SURGICAL HISTORY:  Former smoker      CAD in native artery      Type 2 diabetes mellitus      Hyperlipidemia, unspecified hyperlipidemia type      Essential hypertension, hypertension with unspecified goal      Afib      Hematoma of leg      Stented coronary artery      CHF (congestive heart failure)      s/p Carotid Endarterectomy  left          Allergies    No Known Allergies    Intolerances        SOCIAL HISTORY    Smoking Hx:  ETOH Hx:  Marital Status:  Occupational Hx:    FAMILY HISTORY:  Family history of heart disease  father  age 71    FH: atrial fibrillation  sister        MEDICATIONS:  acetaminophen     Tablet .. 650 milliGRAM(s) Oral every 6 hours PRN  apixaban 2.5 milliGRAM(s) Oral every 12 hours  aspirin enteric coated 81 milliGRAM(s) Oral daily  clopidogrel Tablet 75 milliGRAM(s) Oral daily  dextrose 10% Bolus 125 milliLiter(s) IV Bolus once  dextrose 5%. 1000 milliLiter(s) IV Continuous <Continuous>  dextrose 5%. 1000 milliLiter(s) IV Continuous <Continuous>  dextrose 50% Injectable 12.5 Gram(s) IV Push once  dextrose 50% Injectable 25 Gram(s) IV Push once  dextrose Oral Gel 15 Gram(s) Oral once PRN  glucagon  Injectable 1 milliGRAM(s) IntraMuscular once  influenza  Vaccine (HIGH DOSE) 0.7 milliLiter(s) IntraMuscular once  insulin glargine Injectable (LANTUS) 15 Unit(s) SubCutaneous at bedtime  insulin lispro (ADMELOG) corrective regimen sliding scale   SubCutaneous three times a day before meals  insulin lispro (ADMELOG) corrective regimen sliding scale   SubCutaneous at bedtime  insulin lispro Injectable (ADMELOG) 7 Unit(s) SubCutaneous three times a day before meals  isosorbide   dinitrate Tablet (ISORDIL) 30 milliGRAM(s) Oral three times a day  metoprolol tartrate 25 milliGRAM(s) Oral two times a day    Home Medications:  atorvastatin 80 mg oral tablet: 1 tab(s) orally once a day (at bedtime) (:47)  clopidogrel 75 mg oral tablet: 1 tab(s) orally once a day (:47)  ezetimibe 10 mg oral tablet: 1 tab(s) orally once a day (:47)  Farxiga 10 mg oral tablet: 1 tab(s) orally once a day (47)  furosemide 40 mg oral tablet: 1 tab(s) orally once a day (:47)  multivitamin gummies: 1 gummy orally once a day (:47)  Semglee (Prefilled Pen) 100 units/mL subcutaneous solution: 28-30 units once a day (:47)  valsartan 40 mg oral tablet: 1 tab(s) orally once a day (:47)    REVIEW OF SYSTEMS:    CONSTITUTIONAL: No weakness, fevers or chills  EYES/ENT: No visual changes;  No vertigo or throat pain   NECK: No pain or stiffness  RESPIRATORY: No cough, wheezing, hemoptysis; No shortness of breath  CARDIOVASCULAR: No chest pain or palpitations  GASTROINTESTINAL: No abdominal or epigastric pain. No nausea, vomiting, or hematemesis; No diarrhea or constipation. No melena or hematochezia.  GENITOURINARY: No dysuria, frequency or hematuria  NEUROLOGICAL: No numbness or weakness  SKIN: No itching, burning, rashes, or lesions   All other review of systems is negative unless indicated above    Vital Signs Last 24 Hrs  T(C): 36.5 (:28), Max: 36.5 (:28)  T(F): 97.7 (:), Max: 97.7 (:)  HR: 82 (:) (80 - 106)  BP: 120/75 (:) (107/78 - 146/74)  BP(mean): --  RR: 18 (:) (18 - 20)  SpO2: 99% (:) (98% - 100%)    Parameters below as of   Patient On (Oxygen Delivery Method): nasal cannula  O2 Flow (L/min): 2      I&O's Summary    2024 07:  -  2024 07:00  --------------------------------------------------------  IN: 100 mL / OUT: 500 mL / NET: -400 mL    2024 07:01  -  2024 09:36  --------------------------------------------------------  IN: 240 mL / OUT: 500 mL / NET: -260 mL        PHYSICAL EXAM:    Constitutional: NAD, awake and alert, well-developed  HEENT: PERR, EOMI  Neck: soft and supple, No LAD, No JVD  Respiratory: Breath sounds are clear bilaterally, No wheezing, rales or rhonchi  Cardiovascular: Regular rate and rhythm, normal S1 and S2,  no murmurs, gallops or rubs  Gastrointestinal: Bowel Sounds present, soft, nontender.   Extremities: No peripheral edema. No clubbing or cyanosis.  Vascular: 2+ peripheral pulses  Neurological: A/O x 3, no focal deficits  Musculoskeletal: no calf tenderness.  Skin: No rashes.      LABS: All Labs Reviewed:                        10.6   13.09 )-----------( 402      ( 2024 06:25 )             34.7                         12.7   15.09 )-----------( 531      ( 2024 19:07 )             40.5     2024 06:25    132    |  100    |  60     ----------------------------<  146    4.4     |  20     |  1.76   2024 19:07    133    |  98     |  61     ----------------------------<  184    4.8     |  21     |  1.92     Ca    8.7        2024 06:25  Ca    9.6        2024 19:07  Phos  4.0       2024 06:25  Mg     2.6       2024 06:25  Mg     2.7       2024 19:07    TPro  5.2    /  Alb  2.5    /  TBili  1.0    /  DBili  x      /  AST  1112   /  ALT  611    /  AlkPhos  95     2024 06:25  TPro  6.9    /  Alb  3.6    /  TBili  1.4    /  DBili  x      /  AST  1332   /  ALT  707    /  AlkPhos  123    2024 19:07          Blood Culture:     CARDIAC MARKERS:  Creatine Kinase, Serum (24 @ 19:07)   Creatine Kinase, Serum: 00327 U/L    Creatine Kinase, Serum in AM (24 @ 06:25)   Creatine Kinase, Serum: 90334 U/L              RADIOLOGY/EKG: Atrial fibrillation with moderate VR

## 2024-04-26 NOTE — PROGRESS NOTE ADULT - ASSESSMENT
70 y/o man      h/o severe CAD S/P multiple PCI's including multiple LM and LAD stents.       +A-fib, HTN, HLD, T2DM, CKD3, former smoker     presents with  weaknss/   leg  pains.  no leg  weakness      + troponin /  elevated  cpk      CAD,  has   10  stents/  HTN/  HLD        cath,   4/18,  2.0 mm caliber TITO to distal LAD by Dr ILDA Dominguez        on  dapt/  ef   47     troponin,  from likely   demand  ischemia.  not  mi     per  dr arellano,,  not a  c ndidate   for  further  r intervention,  given  small  caliber  vessels   DM,  follow  fs   CKD    rhabdomyolysis,  follow   cpk.  renal dr barnett    statin  on hold   on asa/  eliquis, plavix, / lasix, imdur.  valsartan.  toprol      hold   Farxiga   and  valsartan for  now,  iv fluids,  per  renal   c/c  anemia         rad< from: TTE W or WO Ultrasound Enhancing Agent (01.29.24 @ 18:15) >  ONCLUSIONS:   1. Left ventricular cavity is small. Left ventricular wall thickness is normal. Left ventricular systolic function is normal with an ejection fraction of 47 % by Ulloa's method of disks.   2. Unable to evaluate wall motion due to poor image quality. There is possible apical hypokinesis.   3. There is severe (grade 3) left ventricular diastolic dysfunction, with elevated filling pressure.   4. Normal right ventricular cavity size and normalsystolic function.   5. There is mild tricuspid regurgitation. Estimated pulmonary artery systolic pressure is 23 mmHg.   6. Compared to the transthoracic echocardiogram performed on 11/26/2023, there have been no significant interval changes.  ____________________________________________________________________  Recommendations:  If clincially indicated, patient should return for repeat echocardiogram with contrast agent, for enhanced left ventricular opacification and improved delineation of the left ventricular endocardial borders. Would suggest a limited study with UEA/ contrast for better delineation of wall motion abnormalities.  ________________________________________________________________________________________  FINDINGS:  < end of copied text >         rad    -  68 y/o man      h/o severe CAD S/P multiple PCI's including multiple LM and LAD stents.       +A-fib, HTN, HLD, T2DM, CKD3, former smoker     presents with  weaknss/   leg  pains.  no leg  weakness      + troponin /  elevated  cpk      CAD,  has   10  stents/  HTN/  HLD        cath,   4/18,  2.0 mm caliber TITO to distal LAD by Dr ILDA Dominguez        on  dapt/  ef   47     troponin,  from likely   demand  ischemia.  not  mi     per  dr arellano,,  not a  c ndidate   for  further  r intervention,  given  small  caliber  vessels   c/c  Afib,  on eliquis    DM,  follow  fs/  endo  d r durant   CKD    rhabdomyolysis,   suspect  statin induced.   follow   cpk.  renal dr barnett    statin  on hold    elevated lfts,  follow  in am/  abd  u.s    dopple r legs   on asa/  eliquis, plavix, / lasix, imdur.  valsartan.  toprol      hold   Farxiga   and  valsartan for  now,  iv fluids,  per  renal   c/c  anemia         rad< from: TTE W or WO Ultrasound Enhancing Agent (01.29.24 @ 18:15) >  ONCLUSIONS:   1. Left ventricular cavity is small. Left ventricular wall thickness is normal. Left ventricular systolic function is normal with an ejection fraction of 47 % by Ulloa's method of disks.   2. Unable to evaluate wall motion due to poor image quality. There is possible apical hypokinesis.   3. There is severe (grade 3) left ventricular diastolic dysfunction, with elevated filling pressure.   4. Normal right ventricular cavity size and normalsystolic function.   5. There is mild tricuspid regurgitation. Estimated pulmonary artery systolic pressure is 23 mmHg.   6. Compared to the transthoracic echocardiogram performed on 11/26/2023, there have been no significant interval changes.  ____________________________________________________________________  Recommendations:  If clincially indicated, patient should return for repeat echocardiogram with contrast agent, for enhanced left ventricular opacification and improved delineation of the left ventricular endocardial borders. Would suggest a limited study with UEA/ contrast for better delineation of wall motion abnormalities.  ________________________________________________________________________________________  FINDINGS:  < end of copied text >         rad    -

## 2024-04-26 NOTE — CONSULT NOTE ADULT - ASSESSMENT
68 y/o male with hx of CAD s/p multiple  stents (most recent to pLM (70%), pLAD, mLAD on 1/8/24 with Dr. Dominguez), HFrEF , A-fib, HTN, HLD, T2DM,  CKD3, former smoker  Assessment  DMT2: 69y Male with DM T2 with hyperglycemia, A1C 7.6% , was on oral meds and insulin at home (Farxiga and Semglee basal insulin), now on basal  bolus insulins, sugars elevated.  CAD: on medications, stable, monitored.   HTN: on antihypertensive medications, monitored, asymptomatic.  CKD: Monitor labs/BMP, renal follow up    Discussed plan and management with Attending   Jcarlos Jamil NP - TEAMS  Dr Matthew Butts

## 2024-04-26 NOTE — CONSULT NOTE ADULT - SUBJECTIVE AND OBJECTIVE BOX
[facility] NEPHROLOGY SERVICES, [facility]  NEPHROLOGY AND HYPERTENSION  [address]  [address]  [place] [address]  [phone]    MD TOYA SAMUEL MD YELENA ROSENBERG, MD BINNY KOSHY, MD CHRISTOPHER CAPUTO, MD EDWARD BOVER, MD      Information from chart:  "Patient is a 69y old  Male who presents with a chief complaint of Rhabdo (2024 09:35)    HPI:  Pt is a 68yo M w/ PMH of HTN, HLD, DM2, CAD s/p 10 stents (last 1 week ago), Afib on Eliquis, HFrEF, CKD III pw LE pain/cramping.    Pt recently admitted - for NSTEMI s/p [facility] w/ stent placement. He now reports b/l LE pain, stiffness and cramping w/ difficulty ambulating or even crossing legs for the past 7-8 days since discharge from the hospital. He also now reports dark urine.     He also reports some mild pressure like CP since discharge for which he followed up w/ his cardiologist outpt and noted to be likely i/s/o recent cath and to c/w Isosorbide.     Today he went to PMD and i/s/o weakness in LE and tachycardia, he was advised to present to the ED.    In the ED VSS w/ labs notable for leukocytosis to 15 and Cr 1.92 (~baseline) w/ elevated liver enzymes, trop 505 -> 506, [place] [address]. He was administered 2L IVF.      (2024 23:00)   "    Currently in chair; leg weakness unable to cross his legs   On statins long term, recently added Isosorbide    PAST MEDICAL & SURGICAL HISTORY:  Former smoker      CAD in native artery      Type 2 diabetes mellitus      Hyperlipidemia, unspecified hyperlipidemia type      Essential hypertension, hypertension with unspecified goal      Afib      Hematoma of leg      Stented coronary artery      CHF (congestive heart failure)      s/p Carotid Endarterectomy  left        FAMILY HISTORY:  Family history of heart disease  father  age 71    FH: atrial fibrillation  sister      Allergies    No Known Allergies    Intolerances      Home Medications:  atorvastatin 80 mg oral tablet: 1 tab(s) orally once a day (at bedtime) (2024 01:47)  clopidogrel 75 mg oral tablet: 1 tab(s) orally once a day (47)  ezetimibe 10 mg oral tablet: 1 tab(s) orally once a day ()  Farxiga 10 mg oral tablet: 1 tab(s) orally once a day ()  furosemide 40 mg oral tablet: 1 tab(s) orally once a day ()  multivitamin gummies: 1 gummy orally once a day ()  Semglee (Prefilled Pen) 100 units/mL subcutaneous solution: 28-30 units once a day ()  valsartan 40 mg oral tablet: 1 tab(s) orally once a day ()    MEDICATIONS  (STANDING):  apixaban 2.5 milliGRAM(s) Oral every 12 hours  aspirin enteric coated 81 milliGRAM(s) Oral daily  clopidogrel Tablet 75 milliGRAM(s) Oral daily  dextrose 10% Bolus 125 milliLiter(s) IV Bolus once  dextrose 5%. 1000 milliLiter(s) (50 mL/Hr) IV Continuous <Continuous>  dextrose 5%. 1000 milliLiter(s) (100 mL/Hr) IV Continuous <Continuous>  dextrose 50% Injectable 12.5 Gram(s) IV Push once  dextrose 50% Injectable 25 Gram(s) IV Push once  glucagon  Injectable 1 milliGRAM(s) IntraMuscular once  influenza  Vaccine (HIGH DOSE) 0.7 milliLiter(s) IntraMuscular once  insulin glargine Injectable (LANTUS) 15 Unit(s) SubCutaneous at bedtime  insulin lispro (ADMELOG) corrective regimen sliding scale   SubCutaneous three times a day before meals  insulin lispro (ADMELOG) corrective regimen sliding scale   SubCutaneous at bedtime  insulin lispro Injectable (ADMELOG) 7 Unit(s) SubCutaneous three times a day before meals  isosorbide   dinitrate Tablet (ISORDIL) 30 milliGRAM(s) Oral three times a day  metoprolol tartrate 25 milliGRAM(s) Oral two times a day    MEDICATIONS  (PRN):  acetaminophen     Tablet .. 650 milliGRAM(s) Oral every 6 hours PRN Temp greater or equal to 38C (100.4F), Mild Pain (1 - 3)  dextrose Oral Gel 15 Gram(s) Oral once PRN Blood Glucose LESS THAN 70 milliGRAM(s)/deciliter    Vital Signs Last 24 Hrs  T(C): 36.5 (2024 04:28), Max: 36.5 (2024 04:28)  T(F): 97.7 (:), Max: 97.7 (:)  HR: 82 (:) (80 - 106)  BP: 120/75 (:28) (107/78 - 146/74)  BP(mean): --  RR: 18 (:28) (18 - 20)  SpO2: 99% (:) (98% - 100%)    Parameters below as of   Patient On (Oxygen Delivery Method): nasal cannula  O2 Flow (L/min): 2      Daily Height in cm: 167.64 (2024 17:56)    Daily     24 @ 07:01  -  24 @ 07:00  --------------------------------------------------------  IN: 100 mL / OUT: 500 mL / NET: -400 mL    24 @ 07:01  -  24 @ 09:55  --------------------------------------------------------  IN: 240 mL / OUT: 500 mL / NET: -260 mL      CAPILLARY BLOOD GLUCOSE      POCT Blood Glucose.: 152 mg/dL (2024 07:58)    PHYSICAL EXAM:      T(C): 36.5 (24 @ 04:28), Max: 36.5 (24 @ 04:28)  HR: 82 (24 @ 04:28) (80 - 106)  BP: 120/75 (24 @ 04:28) (107/78 - 146/74)  RR: 18 (04-26-24 @ 04:28) (18 - 20)  SpO2: 99% (24 @ 04:28) (98% - 100%)  Wt(kg): --  Lungs clear  Heart S1S2  Abd soft NT ND  Extremities:   tr edema                  132<L>  |  100  |  60<H>  ----------------------------<  146<H>  4.4   |  20<L>  |  1.76<H>    Ca    8.7      2024 06:25  Phos  4.0       Mg     2.6         TPro  5.2<L>  /  Alb  2.5<L>  /  TBili  1.0  /  DBili  x   /  AST  1112<H>  /  ALT  611<H>  /  AlkPhos  95                            10.6   13.09 )-----------( 402      ( 2024 06:25 )             34.7     Creatinine Trend: 1.76<--, 1.92<--, 1.89<--, 1.83<--, 1.74<--, 1.77<--  Urinalysis Basic - ( 2024 07:13 )    Color: Orange / Appearance: Clear / S.015 / pH: x  Gluc: x / Ketone: Negative mg/dL  / Bili: Negative / Urobili: 1.0 mg/dL   Blood: x / Protein: 100 mg/dL / Nitrite: Negative   Leuk Esterase: Negative / RBC: 0 /HPF / WBC 1 /HPF   Sq Epi: x / Non Sq Epi: 1 /HPF / Bacteria: Negative /HPF            Assessment   CKD 3 severe rhabdomyolysis   High risk for pigment related ATN    Plan  IVF -150 ml/hr   Addition of loop diuretic to keep in equal balance considering hx of CHF;   Strict I/O's and daily weights  Will follow course    Luis Edgar MD

## 2024-04-27 LAB
ALBUMIN SERPL ELPH-MCNC: 2.8 G/DL — LOW (ref 3.3–5)
ALP SERPL-CCNC: 123 U/L — HIGH (ref 40–120)
ALT FLD-CCNC: 723 U/L — HIGH (ref 10–45)
ANION GAP SERPL CALC-SCNC: 14 MMOL/L — SIGNIFICANT CHANGE UP (ref 5–17)
AST SERPL-CCNC: 1197 U/L — HIGH (ref 10–40)
BILIRUB SERPL-MCNC: 0.9 MG/DL — SIGNIFICANT CHANGE UP (ref 0.2–1.2)
BUN SERPL-MCNC: 68 MG/DL — HIGH (ref 7–23)
CALCIUM SERPL-MCNC: 8.6 MG/DL — SIGNIFICANT CHANGE UP (ref 8.4–10.5)
CHLORIDE SERPL-SCNC: 100 MMOL/L — SIGNIFICANT CHANGE UP (ref 96–108)
CK SERPL-CCNC: CRITICAL HIGH U/L (ref 30–200)
CO2 SERPL-SCNC: 18 MMOL/L — LOW (ref 22–31)
CREAT SERPL-MCNC: 1.89 MG/DL — HIGH (ref 0.5–1.3)
EGFR: 38 ML/MIN/1.73M2 — LOW
GLUCOSE BLDC GLUCOMTR-MCNC: 147 MG/DL — HIGH (ref 70–99)
GLUCOSE BLDC GLUCOMTR-MCNC: 167 MG/DL — HIGH (ref 70–99)
GLUCOSE BLDC GLUCOMTR-MCNC: 246 MG/DL — HIGH (ref 70–99)
GLUCOSE BLDC GLUCOMTR-MCNC: 276 MG/DL — HIGH (ref 70–99)
GLUCOSE SERPL-MCNC: 156 MG/DL — HIGH (ref 70–99)
HCT VFR BLD CALC: 36.1 % — LOW (ref 39–50)
HGB BLD-MCNC: 11.1 G/DL — LOW (ref 13–17)
MAGNESIUM SERPL-MCNC: 2.3 MG/DL — SIGNIFICANT CHANGE UP (ref 1.6–2.6)
MCHC RBC-ENTMCNC: 27.3 PG — SIGNIFICANT CHANGE UP (ref 27–34)
MCHC RBC-ENTMCNC: 30.7 GM/DL — LOW (ref 32–36)
MCV RBC AUTO: 88.7 FL — SIGNIFICANT CHANGE UP (ref 80–100)
MRSA PCR RESULT.: SIGNIFICANT CHANGE UP
NRBC # BLD: 0 /100 WBCS — SIGNIFICANT CHANGE UP (ref 0–0)
NT-PROBNP SERPL-SCNC: HIGH PG/ML (ref 0–300)
PHOSPHATE SERPL-MCNC: 4 MG/DL — SIGNIFICANT CHANGE UP (ref 2.5–4.5)
PLATELET # BLD AUTO: 446 K/UL — HIGH (ref 150–400)
POTASSIUM SERPL-MCNC: 4.4 MMOL/L — SIGNIFICANT CHANGE UP (ref 3.5–5.3)
POTASSIUM SERPL-SCNC: 4.4 MMOL/L — SIGNIFICANT CHANGE UP (ref 3.5–5.3)
PROT SERPL-MCNC: 5.4 G/DL — LOW (ref 6–8.3)
RBC # BLD: 4.07 M/UL — LOW (ref 4.2–5.8)
RBC # FLD: 16.2 % — HIGH (ref 10.3–14.5)
S AUREUS DNA NOSE QL NAA+PROBE: SIGNIFICANT CHANGE UP
SODIUM SERPL-SCNC: 132 MMOL/L — LOW (ref 135–145)
WBC # BLD: 16.17 K/UL — HIGH (ref 3.8–10.5)
WBC # FLD AUTO: 16.17 K/UL — HIGH (ref 3.8–10.5)

## 2024-04-27 PROCEDURE — 99222 1ST HOSP IP/OBS MODERATE 55: CPT

## 2024-04-27 RX ORDER — INSULIN GLARGINE 100 [IU]/ML
18 INJECTION, SOLUTION SUBCUTANEOUS AT BEDTIME
Refills: 0 | Status: DISCONTINUED | OUTPATIENT
Start: 2024-04-27 | End: 2024-04-29

## 2024-04-27 RX ORDER — INSULIN LISPRO 100/ML
8 VIAL (ML) SUBCUTANEOUS
Refills: 0 | Status: DISCONTINUED | OUTPATIENT
Start: 2024-04-27 | End: 2024-04-29

## 2024-04-27 RX ADMIN — Medication 81 MILLIGRAM(S): at 13:00

## 2024-04-27 RX ADMIN — ISOSORBIDE DINITRATE 30 MILLIGRAM(S): 5 TABLET ORAL at 20:15

## 2024-04-27 RX ADMIN — ISOSORBIDE DINITRATE 30 MILLIGRAM(S): 5 TABLET ORAL at 05:39

## 2024-04-27 RX ADMIN — Medication 7 UNIT(S): at 08:42

## 2024-04-27 RX ADMIN — Medication 3: at 12:58

## 2024-04-27 RX ADMIN — Medication 40 MILLIGRAM(S): at 05:39

## 2024-04-27 RX ADMIN — APIXABAN 2.5 MILLIGRAM(S): 2.5 TABLET, FILM COATED ORAL at 05:39

## 2024-04-27 RX ADMIN — SODIUM CHLORIDE 100 MILLILITER(S): 9 INJECTION INTRAMUSCULAR; INTRAVENOUS; SUBCUTANEOUS at 20:12

## 2024-04-27 RX ADMIN — Medication 25 MILLIGRAM(S): at 19:46

## 2024-04-27 RX ADMIN — Medication 25 MILLIGRAM(S): at 05:39

## 2024-04-27 RX ADMIN — Medication 8 UNIT(S): at 16:54

## 2024-04-27 RX ADMIN — CHLORHEXIDINE GLUCONATE 1 APPLICATION(S): 213 SOLUTION TOPICAL at 13:01

## 2024-04-27 RX ADMIN — INSULIN GLARGINE 18 UNIT(S): 100 INJECTION, SOLUTION SUBCUTANEOUS at 21:17

## 2024-04-27 RX ADMIN — Medication 2: at 16:53

## 2024-04-27 RX ADMIN — SODIUM CHLORIDE 100 MILLILITER(S): 9 INJECTION INTRAMUSCULAR; INTRAVENOUS; SUBCUTANEOUS at 08:38

## 2024-04-27 RX ADMIN — Medication 1: at 08:42

## 2024-04-27 RX ADMIN — APIXABAN 2.5 MILLIGRAM(S): 2.5 TABLET, FILM COATED ORAL at 19:47

## 2024-04-27 RX ADMIN — CLOPIDOGREL BISULFATE 75 MILLIGRAM(S): 75 TABLET, FILM COATED ORAL at 13:00

## 2024-04-27 RX ADMIN — Medication 8 UNIT(S): at 12:59

## 2024-04-27 RX ADMIN — ISOSORBIDE DINITRATE 30 MILLIGRAM(S): 5 TABLET ORAL at 13:02

## 2024-04-27 NOTE — PROGRESS NOTE ADULT - ASSESSMENT
68 y/o man      h/o severe CAD S/P multiple PCI's including multiple LM and LAD stents.       +A-fib, HTN, HLD, T2DM, CKD3, former smoker     presents with  weaknss/   leg  pains.  no leg  weakness      + troponin /  elevated  cpk      CAD,  has   10  stents/  HTN/  HLD        cath,   4/18,  2.0 mm caliber TITO to distal LAD by Dr ILDA Dominguez        on  dapt/  ef   47     troponin,  likely   demand  ischemia.  not  mi  per  dr arellano,,  not a  candidate   for  further intervention,   small  caliber  vessels   c/c  Afib,  on eliquis      DM,  follow  fs/  endo  dr durant   CKD    rhabdomyolysis,/  elevated  CPK,    suspect  statin induced.       statin  on hold    elevated lfts,   abd  u/s,  normal liver    doppler  legs.  with PAD   on asa/  eliquis, plavix, / lasix, imdur.  valsartan.  toprol      hold   Farxiga   and  valsartan for  now,  iv fluids,  per  renal   c/c  anemia     elevated  wbc.  afebrile,  doubt  infectious.  will  have  ID  see  pt/  discussed  with  team    rising  cpk, dapt  iv fluids/  renal  following         rad< from: TTE W or WO Ultrasound Enhancing Agent (01.29.24 @ 18:15) >  ONCLUSIONS:   1. Left ventricular cavity is small. Left ventricular wall thickness is normal. Left ventricular systolic function is normal with an ejection fraction of 47 % by Ulloa's method of disks.   2. Unable to evaluate wall motion due to poor image quality. There is possible apical hypokinesis.   3. There is severe (grade 3) left ventricular diastolic dysfunction, with elevated filling pressure.   4. Normal right ventricular cavity size and normalsystolic function.   5. There is mild tricuspid regurgitation. Estimated pulmonary artery systolic pressure is 23 mmHg.   6. Compared to the transthoracic echocardiogram performed on 11/26/2023, there have been no significant interval changes.  ____________________________________________________________________  Recommendations:  If clincially indicated, patient should return for repeat echocardiogram with contrast agent, for enhanced left ventricular opacification and improved delineation of the left ventricular endocardial borders. Would suggest a limited study with UEA/ contrast for better delineation of wall motion abnormalities.  ________________________________________________________________________________________  FINDINGS:  < end of copied text >         rad    -

## 2024-04-27 NOTE — CONSULT NOTE ADULT - SUBJECTIVE AND OBJECTIVE BOX
Patient seen and examined. Chart with pertinent labs and imaging reviewed. Full note to follow.  Rhabdomyolysis, at this point being attributed to statin.  Leukocytosis, likely reactive.   No infectious processes elucidated which would precipitate rhabdomyolysis. No fevers. No clear antecedent viral illness. No concerns for bacterial infection, had episode of chills but that was well before his cardiac cath/stent and has not recurred.  No travel. No localizing complaints on ROS not attributable to rhabdo in/of itself.   Defer antibiotics  Blood cx x2 sets  Left message for Dr. Ridley.  Thank you for the courtesy of this referral.  Chicho Ho MD  Attending Physician  [facility]  Division of Infectious Diseases  [phone]  ============  [facility] of Infectious Diseases  [phone]    KRISTY COYLE  69y, Male  [id number]    HPI--      PMH/PSH--  Carotid Stenosis    Diabetes Mellitus    Neuropathy    Former smoker    CAD in native artery    Type 2 diabetes mellitus    Hyperlipidemia, unspecified hyperlipidemia type    Essential hypertension, hypertension with unspecified goal    Afib    Hematoma of leg    Stented coronary artery    CHF (congestive heart failure)    No Past Surgical History    s/p Carotid Endarterectomy        Allergies--  No Known Allergies      Medications--  Antibiotics:   Immunologic: influenza  Vaccine (HIGH DOSE) 0.7 milliLiter(s) IntraMuscular once    Other: acetaminophen     Tablet .. PRN  apixaban  aspirin enteric coated  chlorhexidine 2% Cloths  clopidogrel Tablet  dextrose 10% Bolus  dextrose 5%.  dextrose 5%.  dextrose 50% Injectable  dextrose 50% Injectable  dextrose Oral Gel PRN  furosemide   Injectable  glucagon  Injectable  insulin glargine Injectable (LANTUS)  insulin lispro (ADMELOG) corrective regimen sliding scale  insulin lispro (ADMELOG) corrective regimen sliding scale  insulin lispro Injectable (ADMELOG)  isosorbide   dinitrate Tablet (ISORDIL)  metoprolol tartrate  sodium chloride 0.9%.    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  No recent travel.    Review of Systems:  A >=10-point review of systems was obtained.     Pertinent positives and negatives--  Constitutional: No fevers. No Chills. No Rigors.   Eyes:  ENMT:  Cardiovascular: No chest pain. No palpitations.  Respiratory: No shortness of breath. No cough.  Gastrointestinal: No nausea or vomiting. No diarrhea or constipation.   Genitourinary:  Musculoskeletal:  Skin:  Neurologic:  Psychiatric: Pleasant. Appropriate affect.  Endocrine:  Heme/Lymphatic:  Allergy/Immunologic:    Review of systems otherwise negative except as previously noted.    Physical Exam--  Vital Signs: T(F): 98.2 (04-27-24 @ 12:19), Max: 98.6 (04-26-24 @ 21:19)  HR: 81 (04-27-24 @ 12:19)  BP: 102/62 (04-27-24 @ 12:19)  RR: 18 (04-27-24 @ 12:19)  SpO2: 97% (04-27-24 @ 12:19)  Wt(kg): --  General: Nontoxic-appearing Male in no acute distress.  HEENT: AT/NC. PERRL. EOMI. Anicteric. Conjunctiva pink and moist. Oropharynx clear. Dentition fair.  Neck: Not rigid. No sense of mass.  Nodes: None palpable.  Lungs: Clear bilaterally without rales, wheezing or rhonchi  Heart: Regular rate and rhythm. No Murmur. No rub. No gallop. No palpable thrill.  Abdomen: Bowel sounds present and normoactive. Soft. Nondistended. Nontender. No sense of mass. No organomegaly.  Back: No spinal tenderness. No costovertebral angle tenderness.   Extremities: No cyanosis or clubbing. No edema.   Skin: Warm. Dry. Good turgor. No rash. No vasculitic stigmata.  Psychiatric: Appropriate affect and mood for situation.         Laboratory & Imaging Data--  CBC                        11.1   16.17 )-----------( 446      ( 27 Apr 2024 07:11 )             36.1       Chemistries  04-27    132<L>  |  100  |  68<H>  ----------------------------<  156<H>  4.4   |  18<L>  |  1.89<H>    Ca    8.6      27 Apr 2024 07:11  Phos  4.0     04-27  Mg     2.3     04-27    TPro  5.4<L>  /  Alb  2.8<L>  /  TBili  0.9  /  DBili  x   /  AST  1197<H>  /  ALT  723<H>  /  AlkPhos  123<H>  04-27      Culture Data       Patient seen and examined. Chart with pertinent labs and imaging reviewed. Full note to follow.  Rhabdomyolysis, at this point being attributed to statin.  Leukocytosis, likely reactive.   No infectious processes elucidated which would precipitate rhabdomyolysis. No fevers. No clear antecedent viral illness. No concerns for bacterial infection, had episode of chills but that was well before his cardiac cath/stent and has not recurred.  No travel. No localizing complaints on ROS not attributable to rhabdo in/of itself.   Defer antibiotics  Blood cx x2 sets  Left message for Dr. Ridley.  Thank you for the courtesy of this referral.  Chicho Ho MD  Attending Physician  [facility]  Division of Infectious Diseases  [phone]  ============  [facility] of Infectious Diseases  [phone]    KRISTY COYLE  69y, Male  [id number]    HPI--  69-year-old male with past medical history significant for diabetes mellitus type 2, peripheral neuropathy, atrial fibrillation, coronary disease status post recent stent (and multiple prior stents), released from the hospital approximately 1 week ago after stent, presents now with severe pain, weakness, fatigue.  Weakness to the point where the patient cannot even cross his legs.  He lives with his wife and his mother-in-law, both of whom more at their baseline state of health.  Denies any fevers chills or rigors since discharge from the hospital, though he did have 1 episode of chills before he was admitted before the prior hospital stay which has not recurred.  Denies any unusual foodstuffs.  Denies any travel.    Here patient initially presented for further evaluation of his lower extremity pain.  He was found to have rhabdomyolysis.  Fortunately his renal function is about at baseline.  He is feeling somewhat better.    Consult called for leukocytosis.  No complaints on extended review of systems other than above.    PMH/PSH--  Carotid Stenosis    Diabetes Mellitus    Neuropathy    Former smoker    CAD in native artery    Type 2 diabetes mellitus    Hyperlipidemia, unspecified hyperlipidemia type    Essential hypertension, hypertension with unspecified goal    Afib    Hematoma of leg    Stented coronary artery    CHF (congestive heart failure)    No Past Surgical History    s/p Carotid Endarterectomy        Allergies--  No Known Allergies      Medications--  Antibiotics:   Immunologic: influenza  Vaccine (HIGH DOSE) 0.7 milliLiter(s) IntraMuscular once    Other: acetaminophen     Tablet .. PRN  apixaban  aspirin enteric coated  chlorhexidine 2% Cloths  clopidogrel Tablet  dextrose 10% Bolus  dextrose 5%.  dextrose 5%.  dextrose 50% Injectable  dextrose 50% Injectable  dextrose Oral Gel PRN  furosemide   Injectable  glucagon  Injectable  insulin glargine Injectable (LANTUS)  insulin lispro (ADMELOG) corrective regimen sliding scale  insulin lispro (ADMELOG) corrective regimen sliding scale  insulin lispro Injectable (ADMELOG)  isosorbide   dinitrate Tablet (ISORDIL)  metoprolol tartrate  sodium chloride 0.9%.    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  No recent travel.    Review of Systems:  A >=10-point review of systems was obtained.   Review of systems otherwise negative except as previously noted.    Physical Exam--  Vital Signs: T(F): 98.2 (04-27-24 @ 12:19), Max: 98.6 (04-26-24 @ 21:19)  HR: 81 (04-27-24 @ 12:19)  BP: 102/62 (04-27-24 @ 12:19)  RR: 18 (04-27-24 @ 12:19)  SpO2: 97% (04-27-24 @ 12:19)  Wt(kg): --  General: Frail bit nontoxic-appearing Male in no acute distress.  HEENT: AT/NC. Anicteric. Conjunctiva pink and moist. Oropharynx clear.   Neck: Not rigid. No sense of mass.  Nodes: None palpable.  Lungs: Clear bilaterally  Heart: Irreg irreg  Abdomen: Bowel sounds present and normoactive. Soft. Nondistended. Nontender.   Extremities: No cyanosis or clubbing. No edema.   Skin: Warm. Dry. Good turgor. No rash. No vasculitic stigmata.  Psychiatric: Appropriate affect and mood for situation.         Laboratory & Imaging Data--  CBC                        11.1   16.17 )-----------( 446      ( 27 Apr 2024 07:11 )             36.1       Chemistries  04-27    132<L>  |  100  |  68<H>  ----------------------------<  156<H>  4.4   |  18<L>  |  1.89<H>    Ca    8.6      27 Apr 2024 07:11  Phos  4.0     04-27  Mg     2.3     04-27    TPro  5.4<L>  /  Alb  2.8<L>  /  TBili  0.9  /  DBili  x   /  AST  1197<H>  /  ALT  723<H>  /  AlkPhos  123<H>  04-27      Culture Data  None

## 2024-04-27 NOTE — PROGRESS NOTE ADULT - ASSESSMENT
70yo M w/ PMH of HTN, HLD, DM2, CAD s/p 10 stents (last 1 week ago), Afib on Eliquis, HFrEF, CKD III pw LE pain/cramping found to have CK >28K w/ elevated liver enzymes statin induced rhabdo.  Atorvastatin on hold.  Patient cannot get a statin again.  Would need to treat hypercholesterolemia with PCSK9.    Continue hydration.  Watch for signs of CHF.  Monitor CPK and LFT's.  Already started to come down.  Pending results today  For chest pain, consult Dr. Balta Ibarra for a possible intervention with coronary sinus narrowing.  Waiting for rhabdo to improve  On eliquis for AF which is chronic  HR is well controlled  Abd ultrasound done on 4/26/24 unremarkable      35 minutes were spent today preparing to see the patient, reviewing history and test results, performing a history and physical exam, ordering appropriate tests, counseling the patient, and documenting.

## 2024-04-27 NOTE — PROGRESS NOTE ADULT - SUBJECTIVE AND OBJECTIVE BOX
date of service: 04-27-24 @ 12:29  afebriel  REVIEW OF SYSTEMS:  CONSTITUTIONAL: No fever,  no  weight loss  ENT:  No  tinnitus,   no   vertigo  NECK: No pain or stiffness  RESPIRATORY: No cough, wheezing, chills or hemoptysis;    No Shortness of Breath  CARDIOVASCULAR: No chest pain, palpitations, dizziness  GASTROINTESTINAL: No abdominal or epigastric pain. No nausea, vomiting, or hematemesis; No diarrhea  No melena or hematochezia.  GENITOURINARY: No dysuria, frequency, hematuria, or incontinence  NEUROLOGICAL: No headaches  SKIN: No itching,  no   rash  LYMPH Nodes: No enlarged glands  ENDOCRINE: No heat or cold intolerance  MUSCULOSKELETAL: No joint pain or swelling  PSYCHIATRIC: No depression, anxiety  HEME/LYMPH: No easy bruising, or bleeding gums  ALLERGY AND IMMUNOLOGIC: No hives or eczema	    MEDICATIONS  (STANDING):  apixaban 2.5 milliGRAM(s) Oral every 12 hours  aspirin enteric coated 81 milliGRAM(s) Oral daily  chlorhexidine 2% Cloths 1 Application(s) Topical daily  clopidogrel Tablet 75 milliGRAM(s) Oral daily  dextrose 10% Bolus 125 milliLiter(s) IV Bolus once  dextrose 5%. 1000 milliLiter(s) (100 mL/Hr) IV Continuous <Continuous>  dextrose 5%. 1000 milliLiter(s) (50 mL/Hr) IV Continuous <Continuous>  dextrose 50% Injectable 12.5 Gram(s) IV Push once  dextrose 50% Injectable 25 Gram(s) IV Push once  furosemide   Injectable 40 milliGRAM(s) IV Push daily  glucagon  Injectable 1 milliGRAM(s) IntraMuscular once  influenza  Vaccine (HIGH DOSE) 0.7 milliLiter(s) IntraMuscular once  insulin glargine Injectable (LANTUS) 18 Unit(s) SubCutaneous at bedtime  insulin lispro (ADMELOG) corrective regimen sliding scale   SubCutaneous three times a day before meals  insulin lispro (ADMELOG) corrective regimen sliding scale   SubCutaneous at bedtime  insulin lispro Injectable (ADMELOG) 8 Unit(s) SubCutaneous three times a day before meals  isosorbide   dinitrate Tablet (ISORDIL) 30 milliGRAM(s) Oral three times a day  metoprolol tartrate 25 milliGRAM(s) Oral two times a day  sodium chloride 0.9%. 1000 milliLiter(s) (100 mL/Hr) IV Continuous <Continuous>    MEDICATIONS  (PRN):  acetaminophen     Tablet .. 650 milliGRAM(s) Oral every 6 hours PRN Temp greater or equal to 38C (100.4F), Mild Pain (1 - 3)  dextrose Oral Gel 15 Gram(s) Oral once PRN Blood Glucose LESS THAN 70 milliGRAM(s)/deciliter      Vital Signs Last 24 Hrs  T(C): 36.8 (27 Apr 2024 12:19), Max: 37 (26 Apr 2024 21:19)  T(F): 98.2 (27 Apr 2024 12:19), Max: 98.6 (26 Apr 2024 21:19)  HR: 81 (27 Apr 2024 12:19) (80 - 98)  BP: 102/62 (27 Apr 2024 12:19) (102/62 - 153/74)  BP(mean): --  RR: 18 (27 Apr 2024 12:19) (18 - 18)  SpO2: 97% (27 Apr 2024 12:19) (96% - 99%)    Parameters below as of 27 Apr 2024 12:19  Patient On (Oxygen Delivery Method): room air      CAPILLARY BLOOD GLUCOSE      POCT Blood Glucose.: 167 mg/dL (27 Apr 2024 08:17)  POCT Blood Glucose.: 265 mg/dL (26 Apr 2024 22:05)  POCT Blood Glucose.: 289 mg/dL (26 Apr 2024 16:50)  POCT Blood Glucose.: 295 mg/dL (26 Apr 2024 14:25)    I&O's Summary    26 Apr 2024 07:01  -  27 Apr 2024 07:00  --------------------------------------------------------  IN: 1500 mL / OUT: 1650 mL / NET: -150 mL          Appearance: Normal	  HEENT:   Normal oral mucosa, PERRL, EOMI	  Lymphatic: No lymphadenopathy  Cardiovascular: Normal S1 S2, No JVD  Respiratory: Lungs clear to auscultation	  Gastrointestinal:  Soft, Non-tender, + BS	  Skin: No rash, No ecchymoses	  Extremities:     LABS:                        11.1   16.17 )-----------( 446      ( 27 Apr 2024 07:11 )             36.1     04-27    132<L>  |  100  |  68<H>  ----------------------------<  156<H>  4.4   |  18<L>  |  1.89<H>    Ca    8.6      27 Apr 2024 07:11  Phos  4.0     04-27  Mg     2.3     04-27    TPro  5.4<L>  /  Alb  2.8<L>  /  TBili  0.9  /  DBili  x   /  AST  1197<H>  /  ALT  723<H>  /  AlkPhos  123<H>  04-27      CARDIAC MARKERS ( 27 Apr 2024 07:11 )  x     / x     / 53034 U/L / x     / x      CARDIAC MARKERS ( 26 Apr 2024 06:25 )  x     / x     / 17935 U/L / x     / x      CARDIAC MARKERS ( 25 Apr 2024 19:07 )  x     / x     / 35146 U/L / x     / x          Urinalysis Basic - ( 27 Apr 2024 07:11 )    Color: x / Appearance: x / SG: x / pH: x  Gluc: 156 mg/dL / Ketone: x  / Bili: x / Urobili: x   Blood: x / Protein: x / Nitrite: x   Leuk Esterase: x / RBC: x / WBC x   Sq Epi: x / Non Sq Epi: x / Bacteria: x              Thyroid Stimulating Hormone, Serum: 1.76 uIU/mL (02-10 @ 07:24)          Consultant(s) Notes Reviewed:      Care Discussed with Consultants/Other Providers:

## 2024-04-27 NOTE — CONSULT NOTE ADULT - ASSESSMENT
Rhabdomyolysis, at this point being attributed to statin.  Leukocytosis, likely reactive.   No infectious processes elucidated which would precipitate rhabdomyolysis. No fevers. No clear antecedent viral illness. No concerns for bacterial infection, had episode of chills but that was well before his cardiac cath/stent and has not recurred.  No travel. No localizing complaints on ROS not attributable to rhabdo in/of itself.   Defer antibiotics  Blood cx x2 sets  Left message for Dr. Ridley.  Thank you for the courtesy of this referral.  Chicho Ho MD  Attending Physician  [facility]  Division of Infectious Diseases  [phone]

## 2024-04-27 NOTE — PROGRESS NOTE ADULT - ASSESSMENT
ASSESSMENT:  (1)Renal - CKD3-4 - due to diabetes/hypertension - at/near baseline  (2)Rhabdo - likely triggers by meds being used for hyperlipidemia - he is off them for now - on NS 100cc/h  (3)CV - at risk for fluid overload from IVF required here for treatment of rhabdo - I agree with use of both IVF and IV diuretics here to drive down the CPK while minimizing risk of associated fluid overload    RECOMMEND:  (1)C/w NS 100cc/h as ordered  (2)Lasix 40mg iv qd as ordered  (3)CPK continues to downtrend 14,233 today  (4)No Zetia/No Lipitor  (5)BMP+Mg daily  (6)Dose new meds for GFR 30-40ml/min  (7)na 132, continue to monitor for now      Becka Hinojosa NP   [facility]  Office: [phone]       ASSESSMENT:  (1)Renal - CKD3-4 - due to diabetes/hypertension - at/near baseline  (2)Rhabdo - likely triggers by meds being used for hyperlipidemia - he is off them for now - on NS 100cc/h  (3)CV - at risk for fluid overload from IVF required here for treatment of rhabdo - I agree with use of both IVF and IV diuretics here to drive down the CPK while minimizing risk of associated fluid overload    RECOMMEND:  (1)C/w NS 100cc/h as ordered  (2)Lasix 40mg iv qd as ordered  (3)trending CPK  (4)No Zetia/No Lipitor  (5)BMP+Mg daily  (6)Dose new meds for GFR 30-40ml/min  (7)na 132, continue to monitor for now      Becka Hinojosa NP   [facility]  Office: [phone]

## 2024-04-27 NOTE — PROGRESS NOTE ADULT - SUBJECTIVE AND OBJECTIVE BOX
Chief complaint  Patient is a 69y old  Male who presents with a chief complaint of Rhabdo (27 Apr 2024 12:28)         Labs and Fingersticks  CAPILLARY BLOOD GLUCOSE      POCT Blood Glucose.: 276 mg/dL (27 Apr 2024 12:38)  POCT Blood Glucose.: 167 mg/dL (27 Apr 2024 08:17)  POCT Blood Glucose.: 265 mg/dL (26 Apr 2024 22:05)  POCT Blood Glucose.: 289 mg/dL (26 Apr 2024 16:50)      Anion Gap: 14 (04-27 @ 07:11)  Anion Gap: 12 (04-26 @ 06:25)  Anion Gap: 14 (04-25 @ 19:07)      Calcium: 8.6 (04-27 @ 07:11)  Calcium: 8.7 (04-26 @ 06:25)  Calcium: 9.6 (04-25 @ 19:07)  Albumin: 2.8 *L* (04-27 @ 07:11)  Albumin: 2.5 *L* (04-26 @ 06:25)  Albumin: 3.6 (04-25 @ 19:07)    Alanine Aminotransferase (ALT/SGPT): 723 *H* (04-27 @ 07:11)  Alanine Aminotransferase (ALT/SGPT): 611 *H* (04-26 @ 06:25)  Alanine Aminotransferase (ALT/SGPT): 707 *H* (04-25 @ 19:07)  Alkaline Phosphatase: 123 *H* (04-27 @ 07:11)  Alkaline Phosphatase: 95 (04-26 @ 06:25)  Alkaline Phosphatase: 123 *H* (04-25 @ 19:07)  Aspartate Aminotransferase (AST/SGOT): 1197 *H* (04-27 @ 07:11)  Aspartate Aminotransferase (AST/SGOT): 1112 *H* (04-26 @ 06:25)  Aspartate Aminotransferase (AST/SGOT): 1332 *H* (04-25 @ 19:07)        04-27    132<L>  |  100  |  68<H>  ----------------------------<  156<H>  4.4   |  18<L>  |  1.89<H>    Ca    8.6      27 Apr 2024 07:11  Phos  4.0     04-27  Mg     2.3     04-27    TPro  5.4<L>  /  Alb  2.8<L>  /  TBili  0.9  /  DBili  x   /  AST  1197<H>  /  ALT  723<H>  /  AlkPhos  123<H>  04-27                        11.1   16.17 )-----------( 446      ( 27 Apr 2024 07:11 )             36.1     Medications  MEDICATIONS  (STANDING):  apixaban 2.5 milliGRAM(s) Oral every 12 hours  aspirin enteric coated 81 milliGRAM(s) Oral daily  chlorhexidine 2% Cloths 1 Application(s) Topical daily  clopidogrel Tablet 75 milliGRAM(s) Oral daily  dextrose 10% Bolus 125 milliLiter(s) IV Bolus once  dextrose 5%. 1000 milliLiter(s) (100 mL/Hr) IV Continuous <Continuous>  dextrose 5%. 1000 milliLiter(s) (50 mL/Hr) IV Continuous <Continuous>  dextrose 50% Injectable 12.5 Gram(s) IV Push once  dextrose 50% Injectable 25 Gram(s) IV Push once  furosemide   Injectable 40 milliGRAM(s) IV Push daily  glucagon  Injectable 1 milliGRAM(s) IntraMuscular once  influenza  Vaccine (HIGH DOSE) 0.7 milliLiter(s) IntraMuscular once  insulin glargine Injectable (LANTUS) 18 Unit(s) SubCutaneous at bedtime  insulin lispro (ADMELOG) corrective regimen sliding scale   SubCutaneous three times a day before meals  insulin lispro (ADMELOG) corrective regimen sliding scale   SubCutaneous at bedtime  insulin lispro Injectable (ADMELOG) 8 Unit(s) SubCutaneous three times a day before meals  isosorbide   dinitrate Tablet (ISORDIL) 30 milliGRAM(s) Oral three times a day  metoprolol tartrate 25 milliGRAM(s) Oral two times a day  sodium chloride 0.9%. 1000 milliLiter(s) (100 mL/Hr) IV Continuous <Continuous>      Physical Exam  General: Patient comfortable in bed  Vital Signs Last 12 Hrs  T(F): 98.2 (04-27-24 @ 12:19), Max: 98.2 (04-27-24 @ 12:19)  HR: 81 (04-27-24 @ 12:19) (80 - 81)  BP: 102/62 (04-27-24 @ 12:19) (102/62 - 111/66)  BP(mean): --  RR: 18 (04-27-24 @ 12:19) (18 - 18)  SpO2: 97% (04-27-24 @ 12:19) (96% - 97%)    CVS: S1S2   Respiratory: No wheezing, no crepitations  GI: Abdomen soft, bowel sounds positive  Musculoskeletal:  moves all extremities

## 2024-04-27 NOTE — PROGRESS NOTE ADULT - ASSESSMENT
70 y/o male with hx of CAD s/p multiple  stents (most recent to pLM (70%), pLAD, mLAD on 1/8/24 with Dr. Dominguez), HFrEF , A-fib, HTN, HLD, T2DM,  CKD3, former smoker  Assessment  DMT2: 69y Male with DM T2 with hyperglycemia, A1C 7.6% , was on oral meds and insulin at home (Farxiga and Semglee basal insulin), now on basal  bolus insulins, sugars elevated.  CAD: on medications, stable, monitored.   HTN: on antihypertensive medications, monitored, asymptomatic.  CKD: Monitor labs/BMP, renal follow up    Discussed plan and management with Attending   Jcarlos Jamil NP - TEAMS

## 2024-04-27 NOTE — PROGRESS NOTE ADULT - PROBLEM SELECTOR PLAN 1
Will increase Lantus to 18 u at bedtime.  Will increase Admelog to 8 u before each meal and continue Admelog correction scale coverage. Will continue monitoring FS and Follow up.

## 2024-04-27 NOTE — PROGRESS NOTE ADULT - SUBJECTIVE AND OBJECTIVE BOX
SUBJECTIVE:  	  MEDICATIONS:  furosemide   Injectable 40 milliGRAM(s) IV Push daily  isosorbide   dinitrate Tablet (ISORDIL) 30 milliGRAM(s) Oral three times a day  metoprolol tartrate 25 milliGRAM(s) Oral two times a day        acetaminophen     Tablet .. 650 milliGRAM(s) Oral every 6 hours PRN      dextrose 50% Injectable 12.5 Gram(s) IV Push once  dextrose 50% Injectable 25 Gram(s) IV Push once  dextrose Oral Gel 15 Gram(s) Oral once PRN  glucagon  Injectable 1 milliGRAM(s) IntraMuscular once  insulin glargine Injectable (LANTUS) 15 Unit(s) SubCutaneous at bedtime  insulin lispro (ADMELOG) corrective regimen sliding scale   SubCutaneous three times a day before meals  insulin lispro (ADMELOG) corrective regimen sliding scale   SubCutaneous at bedtime  insulin lispro Injectable (ADMELOG) 7 Unit(s) SubCutaneous three times a day before meals    apixaban 2.5 milliGRAM(s) Oral every 12 hours  aspirin enteric coated 81 milliGRAM(s) Oral daily  chlorhexidine 2% Cloths 1 Application(s) Topical daily  clopidogrel Tablet 75 milliGRAM(s) Oral daily  dextrose 10% Bolus 125 milliLiter(s) IV Bolus once  dextrose 5%. 1000 milliLiter(s) IV Continuous <Continuous>  dextrose 5%. 1000 milliLiter(s) IV Continuous <Continuous>  influenza  Vaccine (HIGH DOSE) 0.7 milliLiter(s) IntraMuscular once  sodium chloride 0.9%. 1000 milliLiter(s) IV Continuous <Continuous>      REVIEW OF SYSTEMS:    CONSTITUTIONAL: No fever, weight loss, or fatigue  EYES: No eye pain, visual disturbances, or discharge  NECK: No pain or stiffness  RESPIRATORY: No cough, wheezing, chills or hemoptysis; No Shortness of Breath  CARDIOVASCULAR: No chest pain, palpitations, dizziness, or leg swelling  GASTROINTESTINAL: No abdominal or epigastric pain. No nausea, vomiting, or hematemesis; No diarrhea or constipation. No melena or hematochezia.  GENITOURINARY: No dysuria, frequency, hematuria, or incontinence  NEUROLOGICAL: No headaches, memory loss, loss of strength, numbness, or tremors  SKIN: No itching, burning, rashes, or lesions   LYMPH Nodes: No enlarged glands  MUSCULOSKELETAL: No joint pain or swelling; No muscle, back, or extremity pain  All other review of systems are negative.  	  [ ] Unable to obtain    PHYSICAL EXAM:  T(C): 36.7 (04-27-24 @ 04:50), Max: 37 (04-26-24 @ 21:19)  HR: 80 (04-27-24 @ 04:50) (80 - 98)  BP: 111/66 (04-27-24 @ 04:50) (111/66 - 153/74)  RR: 18 (04-27-24 @ 04:50) (18 - 18)  SpO2: 96% (04-27-24 @ 04:50) (96% - 100%)  Wt(kg): --  I&O's Summary    26 Apr 2024 07:01  -  27 Apr 2024 07:00  --------------------------------------------------------  IN: 1500 mL / OUT: 1650 mL / NET: -150 mL          PHYSICAL EXAM    Appearance: Normal	  HEENT:   Normal oral mucosa, PERRL, EOMI	  NECK: Soft and supple, No LAD, No JVD  Cardiovascular: Regular Rate and Rhythm, Normal S1 S2, No murmurs, No clicks, gallops or rubs  Respiratory: Lungs clear to auscultation	  Gastrointestinal:  Soft, Non-tender, + BS	  Skin: No rashes, No ecchymoses, No cyanosis  Neurologic: Non-focal  Extremities: No clubbing, cyanosis or edema  Vascular: Peripheral pulses palpable 2+ bilaterally    TELEMETRY: AF with moderate ventricular response	    	    LABS:	 	    CARDIAC MARKERS:  Troponin T,  serum   506 (04-25 @ 19:42)  505 (04-25 @ 19:07)  594 (04-17 @ 06:48)  347 (04-16 @ 21:53)  96 (04-16 @ 05:44)  87 (04-16 @ 02:40)        Creatine Kinase, Serum (04.25.24 @ 19:07)   Creatine Kinase, Serum: 52403 U/L    Creatine Kinase, Serum in AM (04.26.24 @ 06:25)   Creatine Kinase, Serum: 84081 U/L  CK from today pending                          11.1   16.17 )-----------( 446      ( 27 Apr 2024 07:11 )             36.1     04-26    132<L>  |  100  |  60<H>  ----------------------------<  146<H>  4.4   |  20<L>  |  1.76<H>    Ca    8.7      26 Apr 2024 06:25  Phos  4.0     04-26  Mg     2.6     04-26    TPro  5.2<L>  /  Alb  2.5<L>  /  TBili  1.0  /  DBili  x   /  AST  1112<H>  /  ALT  611<H>  /  AlkPhos  95  04-26      Todays CMP pending

## 2024-04-27 NOTE — PROGRESS NOTE ADULT - SUBJECTIVE AND OBJECTIVE BOX
Patient seen and examined at bedside. No events noted overnight. Resting comfortably in bed. Still feels fatigued      VITAL:  T(C): , Max: 37 (04-26-24 @ 21:19)  T(F): , Max: 98.6 (04-26-24 @ 21:19)  HR: 80 (04-27-24 @ 04:50)  BP: 111/66 (04-27-24 @ 04:50)  BP(mean): --  RR: 18 (04-27-24 @ 04:50)  SpO2: 96% (04-27-24 @ 04:50)  Wt(kg): --      PHYSICAL EXAM:  Constitutional: NAD, Alert  HEENT: NCAT, MMM  Neck: Supple, No JVD  Respiratory: CTA-b/l  Cardiovascular: RRR s1s2, no m/r/g  Gastrointestinal: BS+, soft, NT/ND  Extremities: No peripheral edema b/l  Neurological: no focal deficits; strength grossly intact  Back: no CVAT b/l  Skin: No rashes, no nevi      LABS:                        11.1   16.17 )-----------( 446      ( 27 Apr 2024 07:11 )             36.1     Na(132)/K(4.4)/Cl(100)/HCO3(18)/BUN(68)/Cr(1.89)Glu(156)/Ca(8.6)/Mg(2.3)/PO4(4.0)    04-27 @ 07:11  Na(132)/K(4.4)/Cl(100)/HCO3(20)/BUN(60)/Cr(1.76)Glu(146)/Ca(8.7)/Mg(2.6)/PO4(4.0)    04-26 @ 06:25  Na(133)/K(4.8)/Cl(98)/HCO3(21)/BUN(61)/Cr(1.92)Glu(184)/Ca(9.6)/Mg(2.7)/PO4(--)    04-25 @ 19:07    Urinalysis Basic - ( 27 Apr 2024 07:11 )    Color: x / Appearance: x / SG: x / pH: x  Gluc: 156 mg/dL / Ketone: x  / Bili: x / Urobili: x   Blood: x / Protein: x / Nitrite: x   Leuk Esterase: x / RBC: x / WBC x   Sq Epi: x / Non Sq Epi: x / Bacteria: x

## 2024-04-28 DIAGNOSIS — D72.829 ELEVATED WHITE BLOOD CELL COUNT, UNSPECIFIED: ICD-10-CM

## 2024-04-28 LAB
ALBUMIN SERPL ELPH-MCNC: 2.8 G/DL — LOW (ref 3.3–5)
ALP SERPL-CCNC: 110 U/L — SIGNIFICANT CHANGE UP (ref 40–120)
ALT FLD-CCNC: 693 U/L — HIGH (ref 10–45)
ANION GAP SERPL CALC-SCNC: 13 MMOL/L — SIGNIFICANT CHANGE UP (ref 5–17)
AST SERPL-CCNC: 1094 U/L — HIGH (ref 10–40)
BILIRUB SERPL-MCNC: 0.7 MG/DL — SIGNIFICANT CHANGE UP (ref 0.2–1.2)
BUN SERPL-MCNC: 75 MG/DL — HIGH (ref 7–23)
CALCIUM SERPL-MCNC: 8.6 MG/DL — SIGNIFICANT CHANGE UP (ref 8.4–10.5)
CHLORIDE SERPL-SCNC: 102 MMOL/L — SIGNIFICANT CHANGE UP (ref 96–108)
CK SERPL-CCNC: CRITICAL HIGH U/L (ref 30–200)
CO2 SERPL-SCNC: 17 MMOL/L — LOW (ref 22–31)
CREAT SERPL-MCNC: 1.93 MG/DL — HIGH (ref 0.5–1.3)
EGFR: 37 ML/MIN/1.73M2 — LOW
GLUCOSE BLDC GLUCOMTR-MCNC: 121 MG/DL — HIGH (ref 70–99)
GLUCOSE BLDC GLUCOMTR-MCNC: 199 MG/DL — HIGH (ref 70–99)
GLUCOSE BLDC GLUCOMTR-MCNC: 224 MG/DL — HIGH (ref 70–99)
GLUCOSE BLDC GLUCOMTR-MCNC: 265 MG/DL — HIGH (ref 70–99)
GLUCOSE SERPL-MCNC: 112 MG/DL — HIGH (ref 70–99)
HBV SURFACE AG SER-ACNC: SIGNIFICANT CHANGE UP
HCT VFR BLD CALC: 33 % — LOW (ref 39–50)
HGB BLD-MCNC: 10.9 G/DL — LOW (ref 13–17)
MCHC RBC-ENTMCNC: 28.6 PG — SIGNIFICANT CHANGE UP (ref 27–34)
MCHC RBC-ENTMCNC: 33 GM/DL — SIGNIFICANT CHANGE UP (ref 32–36)
MCV RBC AUTO: 86.6 FL — SIGNIFICANT CHANGE UP (ref 80–100)
NRBC # BLD: 0 /100 WBCS — SIGNIFICANT CHANGE UP (ref 0–0)
PLATELET # BLD AUTO: 414 K/UL — HIGH (ref 150–400)
POTASSIUM SERPL-MCNC: 4.6 MMOL/L — SIGNIFICANT CHANGE UP (ref 3.5–5.3)
POTASSIUM SERPL-SCNC: 4.6 MMOL/L — SIGNIFICANT CHANGE UP (ref 3.5–5.3)
PROT SERPL-MCNC: 5.3 G/DL — LOW (ref 6–8.3)
RBC # BLD: 3.81 M/UL — LOW (ref 4.2–5.8)
RBC # FLD: 16.1 % — HIGH (ref 10.3–14.5)
SODIUM SERPL-SCNC: 132 MMOL/L — LOW (ref 135–145)
WBC # BLD: 16.71 K/UL — HIGH (ref 3.8–10.5)
WBC # FLD AUTO: 16.71 K/UL — HIGH (ref 3.8–10.5)

## 2024-04-28 PROCEDURE — 99222 1ST HOSP IP/OBS MODERATE 55: CPT

## 2024-04-28 RX ORDER — POLYETHYLENE GLYCOL 3350 17 G/17G
17 POWDER, FOR SOLUTION ORAL DAILY
Refills: 0 | Status: DISCONTINUED | OUTPATIENT
Start: 2024-04-28 | End: 2024-05-02

## 2024-04-28 RX ORDER — SENNA PLUS 8.6 MG/1
2 TABLET ORAL AT BEDTIME
Refills: 0 | Status: DISCONTINUED | OUTPATIENT
Start: 2024-04-28 | End: 2024-05-02

## 2024-04-28 RX ADMIN — SODIUM CHLORIDE 125 MILLILITER(S): 9 INJECTION INTRAMUSCULAR; INTRAVENOUS; SUBCUTANEOUS at 09:59

## 2024-04-28 RX ADMIN — ISOSORBIDE DINITRATE 30 MILLIGRAM(S): 5 TABLET ORAL at 12:13

## 2024-04-28 RX ADMIN — Medication 8 UNIT(S): at 12:28

## 2024-04-28 RX ADMIN — INSULIN GLARGINE 18 UNIT(S): 100 INJECTION, SOLUTION SUBCUTANEOUS at 22:15

## 2024-04-28 RX ADMIN — ISOSORBIDE DINITRATE 30 MILLIGRAM(S): 5 TABLET ORAL at 06:00

## 2024-04-28 RX ADMIN — APIXABAN 2.5 MILLIGRAM(S): 2.5 TABLET, FILM COATED ORAL at 06:00

## 2024-04-28 RX ADMIN — Medication 2: at 12:29

## 2024-04-28 RX ADMIN — Medication 40 MILLIGRAM(S): at 06:00

## 2024-04-28 RX ADMIN — Medication 20 MILLIGRAM(S): at 18:11

## 2024-04-28 RX ADMIN — Medication 1: at 22:16

## 2024-04-28 RX ADMIN — Medication 25 MILLIGRAM(S): at 18:04

## 2024-04-28 RX ADMIN — APIXABAN 2.5 MILLIGRAM(S): 2.5 TABLET, FILM COATED ORAL at 18:04

## 2024-04-28 RX ADMIN — CHLORHEXIDINE GLUCONATE 1 APPLICATION(S): 213 SOLUTION TOPICAL at 12:13

## 2024-04-28 RX ADMIN — Medication 81 MILLIGRAM(S): at 12:13

## 2024-04-28 RX ADMIN — SODIUM CHLORIDE 125 MILLILITER(S): 9 INJECTION INTRAMUSCULAR; INTRAVENOUS; SUBCUTANEOUS at 19:48

## 2024-04-28 RX ADMIN — Medication 1: at 18:03

## 2024-04-28 RX ADMIN — ISOSORBIDE DINITRATE 30 MILLIGRAM(S): 5 TABLET ORAL at 18:04

## 2024-04-28 RX ADMIN — Medication 25 MILLIGRAM(S): at 06:00

## 2024-04-28 RX ADMIN — Medication 8 UNIT(S): at 08:33

## 2024-04-28 RX ADMIN — CLOPIDOGREL BISULFATE 75 MILLIGRAM(S): 75 TABLET, FILM COATED ORAL at 12:13

## 2024-04-28 RX ADMIN — Medication 8 UNIT(S): at 18:03

## 2024-04-28 NOTE — PROGRESS NOTE ADULT - PROBLEM SELECTOR PLAN 1
Private car Will increase Lantus to 18 u at bedtime.  Will increase Admelog to 8 u before each meal and continue Admelog correction scale coverage. Will continue monitoring FS and Follow up.  DC on home Semglee   Stop home Farxiga for now due to recent BARRY/rhabdo  OP follow up

## 2024-04-28 NOTE — CONSULT NOTE ADULT - SUBJECTIVE AND OBJECTIVE BOX
incomplete KRISTY COYLE  [id number]    HPI: 70 yo M PMH HLD, CAD (s/p 10 stents, last 2024), Afib on Eliquis, HFrEF, CKD3 2/2 DM/HTN p/w LE pain/cramping found to have CK 28K     Pt recently admitted - for NSTEMI s/p LHC w/ stent placement. He now reports b/l LE pain, stiffness and cramping w/ difficulty ambulating or even crossing legs for the past 7-8 days since discharge from the hospital. He also now reports dark urine.   He also reports some mild pressure like CP since discharge for which he followed up w/ his cardiologist outpt and noted to be likely i/s/o recent cath and to c/w Isosorbide. Today he went to PMD and i/s/o weakness in LE and tachycardia, he was advised to present to the ED.     Of note, low grade CK elevation 971 (24), 1318 (24) in past    ROS  Meds: imdur, aspirin, eliquis, metoprolol, lipitor 80, farxiga, lasix 40, ezetimibe, valsartan, plavix, semglee    VSS  Labs: WBC 16, hgb 10.9 (baseline 8-10),  (350), creatinine 1.9 (baseline 1.7-2), AST 1094 (presented 1332),  (presented 707), BNP 14,233 (baseline 10-20K), UA large blood w/ 0 rbc,       Hospital course:  Admitted , CK 28K. Suspected statin induced rhabdo. Statin held. Received ~7L fluids, CK unchanged. Rheumatology consulted to r/o other cause of rhabdomyolysis.       MEDICATIONS  (STANDING):  apixaban 2.5 milliGRAM(s) Oral every 12 hours  aspirin enteric coated 81 milliGRAM(s) Oral daily  chlorhexidine 2% Cloths 1 Application(s) Topical daily  clopidogrel Tablet 75 milliGRAM(s) Oral daily  dextrose 10% Bolus 125 milliLiter(s) IV Bolus once  dextrose 5%. 1000 milliLiter(s) (100 mL/Hr) IV Continuous <Continuous>  dextrose 5%. 1000 milliLiter(s) (50 mL/Hr) IV Continuous <Continuous>  dextrose 50% Injectable 12.5 Gram(s) IV Push once  dextrose 50% Injectable 25 Gram(s) IV Push once  furosemide   Injectable 40 milliGRAM(s) IV Push daily  glucagon  Injectable 1 milliGRAM(s) IntraMuscular once  influenza  Vaccine (HIGH DOSE) 0.7 milliLiter(s) IntraMuscular once  insulin glargine Injectable (LANTUS) 18 Unit(s) SubCutaneous at bedtime  insulin lispro (ADMELOG) corrective regimen sliding scale   SubCutaneous three times a day before meals  insulin lispro (ADMELOG) corrective regimen sliding scale   SubCutaneous at bedtime  insulin lispro Injectable (ADMELOG) 8 Unit(s) SubCutaneous three times a day before meals  isosorbide   dinitrate Tablet (ISORDIL) 30 milliGRAM(s) Oral three times a day  metoprolol tartrate 25 milliGRAM(s) Oral two times a day  polyethylene glycol 3350 17 Gram(s) Oral daily  senna 2 Tablet(s) Oral at bedtime  sodium chloride 0.9%. 1000 milliLiter(s) (125 mL/Hr) IV Continuous <Continuous>    MEDICATIONS  (PRN):  acetaminophen     Tablet .. 650 milliGRAM(s) Oral every 6 hours PRN Temp greater or equal to 38C (100.4F), Mild Pain (1 - 3)  dextrose Oral Gel 15 Gram(s) Oral once PRN Blood Glucose LESS THAN 70 milliGRAM(s)/deciliter      Allergies    No Known Allergies    Intolerances        PERTINENT MEDICATION HISTORY:    MEDICATIONS:    ALLERGIES:    SURGERIES:    HOSPITALIZATIONS:    FAMILY HISTORY:    TRAVEL HISTORY:    SOCIAL HISTORY:    FAMILY HISTORY:  Family history of heart disease  father  age 71    FH: atrial fibrillation  sister        Vital Signs Last 24 Hrs  T(C): 37 (2024 04:54), Max: 37.4 (2024 21:14)  T(F): 98.6 (2024 04:54), Max: 99.3 (2024 21:14)  HR: 88 (2024 04:54) (81 - 88)  BP: 114/68 (2024 04:54) (102/62 - 128/68)  BP(mean): --  RR: 18 (2024 04:54) (18 - 18)  SpO2: 98% (2024 04:54) (97% - 99%)    Parameters below as of 2024 04:54  Patient On (Oxygen Delivery Method): room air        Physical Exam:  General: Breathing comfortably on room air, no distress  HEENT: EOMI, MMM, no oral ulcers  CVS: +S1/S2, RRR  Resp: CTA b/l. No crackles/wheezing  GI: Soft, NT/ND +BS  MSK: 5/5 muscle strength in arms and legs. B/l shoulder, elbow, wrist, MCP/PIP/DIP, Knee, ankle w/o swelling/erythema/or tenderness  Skin: no visible rashes    LABS:                        10.9   16.71 )-----------( 414      ( 2024 06:22 )             33.0         132<L>  |  102  |  75<H>  ----------------------------<  112<H>  4.6   |  17<L>  |  1.93<H>    Ca    8.6      2024 06:19  Phos  4.0       Mg     2.3         TPro  5.3<L>  /  Alb  2.8<L>  /  TBili  0.7  /  DBili  x   /  AST  1094<H>  /  ALT  693<H>  /  AlkPhos  110        Urinalysis Basic - ( 2024 06:19 )    Color: x / Appearance: x / SG: x / pH: x  Gluc: 112 mg/dL / Ketone: x  / Bili: x / Urobili: x   Blood: x / Protein: x / Nitrite: x   Leuk Esterase: x / RBC: x / WBC x   Sq Epi: x / Non Sq Epi: x / Bacteria: x        RADIOLOGY & ADDITIONAL STUDIES: Reviewed     KRISTY COYLE  [id number]    HPI: 70 yo M PMH HLD, CAD (s/p 10 stents, last 2024), Afib on Eliquis, HFrEF, CKD3 2/2 DM/HTN p/w LE pain/cramping found to have CK 28K     Pt recently admitted - for NSTEMI s/p C w/ stent placement. Says as soon as he went home, developed b/l thigh/calf "tightness" w/ weakness in his thighs. Symptoms worsened over the weeks and he came to ED. Pt mentions feeling URI symptoms w/ significant fatigue days prior to stent placement. Pt says he has been on lipitor 80mg for one year. Since NSTEMI/stent placement discharge, says his imdur and aspirin were increased and no other changes to medications. Says he had c-scope this year wnl.       Of note, low grade CK elevation 971 (24), 1318 (24) in past    ROS:  -denies dysphagia, fever, chills, chest pain, sob, rash, joint pain    Meds: imdur, aspirin, eliquis, metoprolol, lipitor 80, farxiga, lasix 40, ezetimibe, valsartan, plavix, semglee  Family hx: no rheumatic dx  Social hx: no tobacco/alcohol use, works as  and     VSS  Labs: WBC 16, hgb 10.9 (baseline 8-10),  (350), creatinine 1.9 (baseline 1.7-2), AST 1094 (presented 1332),  (presented 707), BNP 14,233 (baseline 10-20K), UA large blood w/ 0 rbc,       Hospital course:  Admitted , CK 28K. Suspected statin induced rhabdo. Statin held. Received ~7L fluids, CK unchanged. Rheumatology consulted to r/o other cause of rhabdomyolysis.       MEDICATIONS  (STANDING):  apixaban 2.5 milliGRAM(s) Oral every 12 hours  aspirin enteric coated 81 milliGRAM(s) Oral daily  chlorhexidine 2% Cloths 1 Application(s) Topical daily  clopidogrel Tablet 75 milliGRAM(s) Oral daily  dextrose 10% Bolus 125 milliLiter(s) IV Bolus once  dextrose 5%. 1000 milliLiter(s) (100 mL/Hr) IV Continuous <Continuous>  dextrose 5%. 1000 milliLiter(s) (50 mL/Hr) IV Continuous <Continuous>  dextrose 50% Injectable 12.5 Gram(s) IV Push once  dextrose 50% Injectable 25 Gram(s) IV Push once  furosemide   Injectable 40 milliGRAM(s) IV Push daily  glucagon  Injectable 1 milliGRAM(s) IntraMuscular once  influenza  Vaccine (HIGH DOSE) 0.7 milliLiter(s) IntraMuscular once  insulin glargine Injectable (LANTUS) 18 Unit(s) SubCutaneous at bedtime  insulin lispro (ADMELOG) corrective regimen sliding scale   SubCutaneous three times a day before meals  insulin lispro (ADMELOG) corrective regimen sliding scale   SubCutaneous at bedtime  insulin lispro Injectable (ADMELOG) 8 Unit(s) SubCutaneous three times a day before meals  isosorbide   dinitrate Tablet (ISORDIL) 30 milliGRAM(s) Oral three times a day  metoprolol tartrate 25 milliGRAM(s) Oral two times a day  polyethylene glycol 3350 17 Gram(s) Oral daily  senna 2 Tablet(s) Oral at bedtime  sodium chloride 0.9%. 1000 milliLiter(s) (125 mL/Hr) IV Continuous <Continuous>    MEDICATIONS  (PRN):  acetaminophen     Tablet .. 650 milliGRAM(s) Oral every 6 hours PRN Temp greater or equal to 38C (100.4F), Mild Pain (1 - 3)  dextrose Oral Gel 15 Gram(s) Oral once PRN Blood Glucose LESS THAN 70 milliGRAM(s)/deciliter      Allergies    No Known Allergies    Intolerances           FAMILY HISTORY:  Family history of heart disease  father  age 71    FH: atrial fibrillation  sister        Vital Signs Last 24 Hrs  T(C): 37 (2024 04:54), Max: 37.4 (2024 21:14)  T(F): 98.6 (2024 04:54), Max: 99.3 (2024 21:14)  HR: 88 (2024 04:54) (81 - 88)  BP: 114/68 (2024 04:54) (102/62 - 128/68)  BP(mean): --  RR: 18 (2024 04:54) (18 - 18)  SpO2: 98% (2024 04:54) (97% - 99%)    Parameters below as of 2024 04:54  Patient On (Oxygen Delivery Method): room air        Physical Exam:  General: Breathing comfortably on room air, no distress  HEENT: EOMI, MMM, no oral ulcers  CVS: +S1/S2, RRR  Resp: CTA b/l. No crackles/wheezing  GI: Soft, NT/ND +BS  MSK: no synovitis. poor neck strength (chin dips to chest at times), 4/5 strength in b/l shoulders, 5/5 distals arms/hands, 2/5 b/l thighs, 5/5 legs/feet  Skin: no visible rashes    LABS:                        10.9   16.71 )-----------( 414      ( 2024 06:22 )             33.0         132<L>  |  102  |  75<H>  ----------------------------<  112<H>  4.6   |  17<L>  |  1.93<H>    Ca    8.6      2024 06:19  Phos  4.0     -  Mg     2.3         TPro  5.3<L>  /  Alb  2.8<L>  /  TBili  0.7  /  DBili  x   /  AST  1094<H>  /  ALT  693<H>  /  AlkPhos  110        Urinalysis Basic - ( 2024 06:19 )    Color: x / Appearance: x / SG: x / pH: x  Gluc: 112 mg/dL / Ketone: x  / Bili: x / Urobili: x   Blood: x / Protein: x / Nitrite: x   Leuk Esterase: x / RBC: x / WBC x   Sq Epi: x / Non Sq Epi: x / Bacteria: x        RADIOLOGY & ADDITIONAL STUDIES: Reviewed    Creatine Kinase, Serum in AM (24 @ 06:19)   Creatine Kinase, Serum: 83073 U/L      Historical Values  Creatine Kinase, Serum in AM (24 @ 06:19)   Creatine Kinase, Serum: 86248 U/L  Creatine Kinase, Serum in AM (24 @ 07:11)   Creatine Kinase, Serum: 45865 U/L  Creatine Kinase, Serum in AM (24 @ 06:25)   Creatine Kinase, Serum: 82628 U/L  Creatine Kinase, Serum in AM (. @ 07:03)   Creatine Kinase, Serum: 164 U/L

## 2024-04-28 NOTE — PROGRESS NOTE ADULT - ASSESSMENT
70yo M w/ PMH of HTN, HLD, DM2, CAD s/p 10 stents (last 1 week ago), Afib on Eliquis, HFrEF, CKD III pw LE pain/cramping found to have CK >28K w/ elevated liver enzymes statin induced rhabdo.  Atorvastatin on hold.  Patient cannot get a statin again.  Would need to treat hypercholesterolemia with PCSK9.    Continue hydration.  Watch for signs of CHF.  Monitor CPK and LFT's.  CPK increased over the last 2 days.  Would consult rheumatology to r/o other cause of rhabdomyolysis aside from statin.  Statin stopped on admission 3 days ago.  Nephrology to see as well.  Cr 1.93 which is unchanged from admission.    No intervention at this time for coronary arteries as per Dr. Kelley  On eliquis for AF which is chronic  HR is well controlled  Abd ultrasound done on 4/26/24 unremarkable      35 minutes were spent today preparing to see the patient, reviewing history and test results, performing a history and physical exam, ordering appropriate tests, counseling the patient, and documenting.

## 2024-04-28 NOTE — PROGRESS NOTE ADULT - SUBJECTIVE AND OBJECTIVE BOX
Chief complaint  Patient is a 69y old  Male who presents with a chief complaint of Rhabdo (28 Apr 2024 13:17)         Labs and Fingersticks  CAPILLARY BLOOD GLUCOSE      POCT Blood Glucose.: 224 mg/dL (28 Apr 2024 12:27)  POCT Blood Glucose.: 121 mg/dL (28 Apr 2024 08:03)  POCT Blood Glucose.: 147 mg/dL (27 Apr 2024 21:06)  POCT Blood Glucose.: 246 mg/dL (27 Apr 2024 16:37)      Anion Gap: 13 (04-28 @ 06:19)  Anion Gap: 14 (04-27 @ 07:11)      Calcium: 8.6 (04-28 @ 06:19)  Calcium: 8.6 (04-27 @ 07:11)  Albumin: 2.8 *L* (04-28 @ 06:19)  Albumin: 2.8 *L* (04-27 @ 07:11)    Alanine Aminotransferase (ALT/SGPT): 693 *H* (04-28 @ 06:19)  Alanine Aminotransferase (ALT/SGPT): 723 *H* (04-27 @ 07:11)  Alkaline Phosphatase: 110 (04-28 @ 06:19)  Alkaline Phosphatase: 123 *H* (04-27 @ 07:11)  Aspartate Aminotransferase (AST/SGOT): 1094 *H* (04-28 @ 06:19)  Aspartate Aminotransferase (AST/SGOT): 1197 *H* (04-27 @ 07:11)        04-28    132<L>  |  102  |  75<H>  ----------------------------<  112<H>  4.6   |  17<L>  |  1.93<H>    Ca    8.6      28 Apr 2024 06:19  Phos  4.0     04-27  Mg     2.3     04-27    TPro  5.3<L>  /  Alb  2.8<L>  /  TBili  0.7  /  DBili  x   /  AST  1094<H>  /  ALT  693<H>  /  AlkPhos  110  04-28                        10.9   16.71 )-----------( 414      ( 28 Apr 2024 06:22 )             33.0     Medications  MEDICATIONS  (STANDING):  apixaban 2.5 milliGRAM(s) Oral every 12 hours  aspirin enteric coated 81 milliGRAM(s) Oral daily  chlorhexidine 2% Cloths 1 Application(s) Topical daily  clopidogrel Tablet 75 milliGRAM(s) Oral daily  dextrose 10% Bolus 125 milliLiter(s) IV Bolus once  dextrose 5%. 1000 milliLiter(s) (50 mL/Hr) IV Continuous <Continuous>  dextrose 5%. 1000 milliLiter(s) (100 mL/Hr) IV Continuous <Continuous>  dextrose 50% Injectable 12.5 Gram(s) IV Push once  dextrose 50% Injectable 25 Gram(s) IV Push once  furosemide   Injectable 40 milliGRAM(s) IV Push daily  glucagon  Injectable 1 milliGRAM(s) IntraMuscular once  influenza  Vaccine (HIGH DOSE) 0.7 milliLiter(s) IntraMuscular once  insulin glargine Injectable (LANTUS) 18 Unit(s) SubCutaneous at bedtime  insulin lispro (ADMELOG) corrective regimen sliding scale   SubCutaneous three times a day before meals  insulin lispro (ADMELOG) corrective regimen sliding scale   SubCutaneous at bedtime  insulin lispro Injectable (ADMELOG) 8 Unit(s) SubCutaneous three times a day before meals  isosorbide   dinitrate Tablet (ISORDIL) 30 milliGRAM(s) Oral three times a day  metoprolol tartrate 25 milliGRAM(s) Oral two times a day  polyethylene glycol 3350 17 Gram(s) Oral daily  senna 2 Tablet(s) Oral at bedtime  sodium chloride 0.9%. 1000 milliLiter(s) (125 mL/Hr) IV Continuous <Continuous>      Physical Exam  General: Patient comfortable in bed   Vital Signs Last 12 Hrs  T(F): 98.7 (04-28-24 @ 12:28), Max: 98.7 (04-28-24 @ 12:28)  HR: 78 (04-28-24 @ 12:28) (78 - 88)  BP: 102/65 (04-28-24 @ 12:28) (102/65 - 114/68)  BP(mean): --  RR: 18 (04-28-24 @ 12:28) (18 - 18)  SpO2: 96% (04-28-24 @ 12:28) (96% - 98%)    CVS: S1S2   Respiratory: No wheezing, no crepitations  GI: Abdomen soft, bowel sounds positive  Musculoskeletal:  moves all extremities

## 2024-04-28 NOTE — CONSULT NOTE ADULT - ASSESSMENT
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 Treatment is to discontinue the offending statin and avoid future use. Pt will likely need further immunosuppression given significant weakness on exam. To confirm the diagnosis and rule out other causes of inflammatory myositis, will send additional serology and obtain muscle biopsy. In the meantime, will start immunosuppressive treatment to avoid further muscle damage.     Plan:  -would start solumedrol 20mg q12 to treat suspected inflammatory myositis - if okay to do so per cardiology given recent NSTEMI/stent placement]  -please adjust insulin if starting steroids  -please obtain stat QuantiFeron, hepatitis B/C, myomarker panel, HMGCoA reductase, immunoglobulins  -please obtain MRI b/l thighs w/ STIR protocol and would obtain muscle biopsy      #per inflammatory myositis malignancy screening guidelines: Pt is intermediate risk for malignancy (reports normal C-scope 2024)  -would obtain CT neck, chest, abdomen, pelvis  -PSA, SPEP/UPEP/immunofixation, esr/crp    Discussed with Attending Dr. Liyah Mary, DO  Rheumatology Fellow  Available on TEAMS    Reference: International Guideline for Idiopathic Inflammatory Myopathy-Associated Cancer Screening: an International Myositis Assessment and Clinical Studies Group (IMACS) initiative

## 2024-04-28 NOTE — PROGRESS NOTE ADULT - SUBJECTIVE AND OBJECTIVE BOX
date of service: 04-28-24 @ 13:17  afberile  REVIEW OF SYSTEMS:  CONSTITUTIONAL: No fever,  no  weight loss  ENT:  No  tinnitus,   no   vertigo  NECK: No pain or stiffness  RESPIRATORY: No cough, wheezing, chills or hemoptysis;    No Shortness of Breath  CARDIOVASCULAR: No chest pain, palpitations, dizziness  GASTROINTESTINAL: No abdominal or epigastric pain. No nausea, vomiting, or hematemesis; No diarrhea  No melena or hematochezia.  GENITOURINARY: No dysuria, frequency, hematuria, or incontinence  NEUROLOGICAL: No headaches  SKIN: No itching,  no   rash  LYMPH Nodes: No enlarged glands  ENDOCRINE: No heat or cold intolerance  MUSCULOSKELETAL: No joint pain or swelling  PSYCHIATRIC: No depression, anxiety  HEME/LYMPH: No easy bruising, or bleeding gums  ALLERGY AND IMMUNOLOGIC: No hives or eczema	    MEDICATIONS  (STANDING):  apixaban 2.5 milliGRAM(s) Oral every 12 hours  aspirin enteric coated 81 milliGRAM(s) Oral daily  chlorhexidine 2% Cloths 1 Application(s) Topical daily  clopidogrel Tablet 75 milliGRAM(s) Oral daily  dextrose 10% Bolus 125 milliLiter(s) IV Bolus once  dextrose 5%. 1000 milliLiter(s) (100 mL/Hr) IV Continuous <Continuous>  dextrose 5%. 1000 milliLiter(s) (50 mL/Hr) IV Continuous <Continuous>  dextrose 50% Injectable 12.5 Gram(s) IV Push once  dextrose 50% Injectable 25 Gram(s) IV Push once  furosemide   Injectable 40 milliGRAM(s) IV Push daily  glucagon  Injectable 1 milliGRAM(s) IntraMuscular once  influenza  Vaccine (HIGH DOSE) 0.7 milliLiter(s) IntraMuscular once  insulin glargine Injectable (LANTUS) 18 Unit(s) SubCutaneous at bedtime  insulin lispro (ADMELOG) corrective regimen sliding scale   SubCutaneous three times a day before meals  insulin lispro (ADMELOG) corrective regimen sliding scale   SubCutaneous at bedtime  insulin lispro Injectable (ADMELOG) 8 Unit(s) SubCutaneous three times a day before meals  isosorbide   dinitrate Tablet (ISORDIL) 30 milliGRAM(s) Oral three times a day  metoprolol tartrate 25 milliGRAM(s) Oral two times a day  polyethylene glycol 3350 17 Gram(s) Oral daily  senna 2 Tablet(s) Oral at bedtime  sodium chloride 0.9%. 1000 milliLiter(s) (125 mL/Hr) IV Continuous <Continuous>    MEDICATIONS  (PRN):  acetaminophen     Tablet .. 650 milliGRAM(s) Oral every 6 hours PRN Temp greater or equal to 38C (100.4F), Mild Pain (1 - 3)  dextrose Oral Gel 15 Gram(s) Oral once PRN Blood Glucose LESS THAN 70 milliGRAM(s)/deciliter      Vital Signs Last 24 Hrs  T(C): 37.1 (28 Apr 2024 12:28), Max: 37.4 (27 Apr 2024 21:14)  T(F): 98.7 (28 Apr 2024 12:28), Max: 99.3 (27 Apr 2024 21:14)  HR: 78 (28 Apr 2024 12:28) (78 - 88)  BP: 102/65 (28 Apr 2024 12:28) (102/65 - 128/68)  BP(mean): --  RR: 18 (28 Apr 2024 12:28) (18 - 18)  SpO2: 96% (28 Apr 2024 12:28) (96% - 99%)    Parameters below as of 28 Apr 2024 12:28  Patient On (Oxygen Delivery Method): room air      CAPILLARY BLOOD GLUCOSE      POCT Blood Glucose.: 224 mg/dL (28 Apr 2024 12:27)  POCT Blood Glucose.: 121 mg/dL (28 Apr 2024 08:03)  POCT Blood Glucose.: 147 mg/dL (27 Apr 2024 21:06)  POCT Blood Glucose.: 246 mg/dL (27 Apr 2024 16:37)    I&O's Summary    27 Apr 2024 07:01  -  28 Apr 2024 07:00  --------------------------------------------------------  IN: 360 mL / OUT: 1250 mL / NET: -890 mL    28 Apr 2024 07:01  -  28 Apr 2024 13:17  --------------------------------------------------------  IN: 240 mL / OUT: 200 mL / NET: 40 mL          Appearance: Normal	  HEENT:   Normal oral mucosa, PERRL, EOMI	  Lymphatic: No lymphadenopathy  Cardiovascular: Normal S1 S2, No JVD  Respiratory: Lungs clear to auscultation	  Gastrointestinal:  Soft, Non-tender, + BS	  Skin: No rash, No ecchymoses	  Extremities:     LABS:                        10.9   16.71 )-----------( 414      ( 28 Apr 2024 06:22 )             33.0     04-28    132<L>  |  102  |  75<H>  ----------------------------<  112<H>  4.6   |  17<L>  |  1.93<H>    Ca    8.6      28 Apr 2024 06:19  Phos  4.0     04-27  Mg     2.3     04-27    TPro  5.3<L>  /  Alb  2.8<L>  /  TBili  0.7  /  DBili  x   /  AST  1094<H>  /  ALT  693<H>  /  AlkPhos  110  04-28      CARDIAC MARKERS ( 28 Apr 2024 06:19 )  x     / x     / 81372 U/L / x     / x      CARDIAC MARKERS ( 27 Apr 2024 07:11 )  x     / x     / 51387 U/L / x     / x          Urinalysis Basic - ( 28 Apr 2024 06:19 )    Color: x / Appearance: x / SG: x / pH: x  Gluc: 112 mg/dL / Ketone: x  / Bili: x / Urobili: x   Blood: x / Protein: x / Nitrite: x   Leuk Esterase: x / RBC: x / WBC x   Sq Epi: x / Non Sq Epi: x / Bacteria: x              Thyroid Stimulating Hormone, Serum: 1.76 uIU/mL (02-10 @ 07:24)          Consultant(s) Notes Reviewed:      Care Discussed with Consultants/Other Providers:

## 2024-04-28 NOTE — PROGRESS NOTE ADULT - ASSESSMENT
68 y/o man      h/o severe CAD S/P multiple PCI's including multiple LM and LAD stents.       +A-fib, HTN, HLD, T2DM, CKD3, former smoker     presents with  weaknss/   leg  pains.  no leg  weakness      + troponin /  elevated  cpk      CAD,  has   10  stents/  HTN/  HLD        cath,   4/18,  2.0 mm caliber TITO to distal LAD by Dr ILDA Dominguez        on  dapt/  ef   49     troponin,  likely   demand  ischemia.  not  mi  per  dr arellano,,  not a  candidate   for  further intervention,   small  caliber  vessels   c/c  Afib,  on eliquis      DM,  follow  fs/  endo  dr durant   CKD    rhabdomyolysis,/  elevated  CPK,    suspect  statin induced.       statin  on hold    elevated lfts,   abd  u/s,  normal liver    doppler  legs.  with PAD/  will  f/p  a s an out pt with vascular. when stable   on asa/  eliquis, plavix, / lasix, imdur.  valsartan.  toprol      hold   Farxiga   and  valsartan for  now,  iv fluids,  per  renal   c/c  anemia     elevated  wbc.  afebrile,  doubt  infectious. a nd no ab per  ID    rising  cpk,    iv fluids/  renal  following/  follwo  lfts/ cpk         rad< from: TTE W or WO Ultrasound Enhancing Agent (01.29.24 @ 18:15) >  ONCLUSIONS:   1. Left ventricular cavity is small. Left ventricular wall thickness is normal. Left ventricular systolic function is normal with an ejection fraction of 47 % by Ulloa's method of disks.   2. Unable to evaluate wall motion due to poor image quality. There is possible apical hypokinesis.   3. There is severe (grade 3) left ventricular diastolic dysfunction, with elevated filling pressure.   4. Normal right ventricular cavity size and normalsystolic function.   5. There is mild tricuspid regurgitation. Estimated pulmonary artery systolic pressure is 23 mmHg.   6. Compared to the transthoracic echocardiogram performed on 11/26/2023, there have been no significant interval changes.  ____________________________________________________________________  Recommendations:  If clincially indicated, patient should return for repeat echocardiogram with contrast agent, for enhanced left ventricular opacification and improved delineation of the left ventricular endocardial borders. Would suggest a limited study with UEA/ contrast for better delineation of wall motion abnormalities.  ________________________________________________________________________________________  FINDINGS:  < end of copied text >         rad    -  68 y/o man      h/o severe CAD S/P multiple PCI's including multiple LM and LAD stents.       +A-fib, HTN, HLD, T2DM, CKD3, former smoker     presents with  weaknss/   leg  pains.  no leg  weakness      + troponin /  elevated  cpk      CAD,  has   10  stents/  HTN/  HLD        cath,   4/18,  2.0 mm caliber TITO to distal LAD by Dr ILDA Dominguez        on  dapt/  ef   49     troponin,  likely   demand  ischemia.  not  mi  per  dr arellano,,  not a  candidate   for  further intervention,   small  caliber  vessels   c/c  Afib,  on eliquis      DM,  follow  fs/  endo  dr durant   CKD    rhabdomyolysis,/  elevated  CPK,    suspect  ?  statin induced.       statin  on hold    elevated lfts,   abd  u/s,  normal liver    doppler  legs.  with PAD/  will  f/p  a s an out pt with vascular. when stable   on asa/  eliquis, plavix, / lasix, imdur.  valsartan.  toprol      hold   Farxiga   and  valsartan for  now,  iv fluids,  per  renal   c/c  anemia     elevated  wbc.  afebrile,  doubt  infectious. a nd no ab per  ID    rising  cpk,    iv fluids/    follow   lfts/ cpk,   nee d to  r/o  polymyositis, vs  drug induced   myositis,            w/p per  rheum /  mri thigh/  will  need  bx         rad< from: TTE W or WO Ultrasound Enhancing Agent (01.29.24 @ 18:15) >  ONCLUSIONS:   1. Left ventricular cavity is small. Left ventricular wall thickness is normal. Left ventricular systolic function is normal with an ejection fraction of 47 % by Ulloa's method of disks.   2. Unable to evaluate wall motion due to poor image quality. There is possible apical hypokinesis.   3. There is severe (grade 3) left ventricular diastolic dysfunction, with elevated filling pressure.   4. Normal right ventricular cavity size and normalsystolic function.   5. There is mild tricuspid regurgitation. Estimated pulmonary artery systolic pressure is 23 mmHg.   6. Compared to the transthoracic echocardiogram performed on 11/26/2023, there have been no significant interval changes.  ____________________________________________________________________  Recommendations:  If clincially indicated, patient should return for repeat echocardiogram with contrast agent, for enhanced left ventricular opacification and improved delineation of the left ventricular endocardial borders. Would suggest a limited study with UEA/ contrast for better delineation of wall motion abnormalities.  ________________________________________________________________________________________  FINDINGS:  < end of copied text >         rad    -

## 2024-04-28 NOTE — PROGRESS NOTE ADULT - SUBJECTIVE AND OBJECTIVE BOX
Patient seen and examined at bedside. Resting in the chair. Remains on IVF'. Feels weak still        VITAL:  T(C): , Max: 37.4 (04-27-24 @ 21:14)  T(F): , Max: 99.3 (04-27-24 @ 21:14)  HR: 88 (04-28-24 @ 04:54)  BP: 114/68 (04-28-24 @ 04:54)  BP(mean): --  RR: 18 (04-28-24 @ 04:54)  SpO2: 98% (04-28-24 @ 04:54)  Wt(kg): --      PHYSICAL EXAM:  Constitutional: NAD, Alert, fatigued  HEENT: NCAT, MMM  Neck: Supple, No JVD  Respiratory: CTA-b/l  Cardiovascular: RRR s1s2, no m/r/g  Gastrointestinal: BS+, soft, NT/ND  Extremities: No peripheral edema b/l  Neurological: no focal deficits; strength grossly intact  Back: no CVAT b/l  Skin: No rashes, no nevi      LABS:                        10.9   16.71 )-----------( 414      ( 28 Apr 2024 06:22 )             33.0     Na(132)/K(4.6)/Cl(102)/HCO3(17)/BUN(75)/Cr(1.93)Glu(112)/Ca(8.6)/Mg(--)/PO4(--)    04-28 @ 06:19  Na(132)/K(4.4)/Cl(100)/HCO3(18)/BUN(68)/Cr(1.89)Glu(156)/Ca(8.6)/Mg(2.3)/PO4(4.0)    04-27 @ 07:11  Na(132)/K(4.4)/Cl(100)/HCO3(20)/BUN(60)/Cr(1.76)Glu(146)/Ca(8.7)/Mg(2.6)/PO4(4.0)    04-26 @ 06:25  Na(133)/K(4.8)/Cl(98)/HCO3(21)/BUN(61)/Cr(1.92)Glu(184)/Ca(9.6)/Mg(2.7)/PO4(--)    04-25 @ 19:07    Urinalysis Basic - ( 28 Apr 2024 06:19 )    Color: x / Appearance: x / SG: x / pH: x  Gluc: 112 mg/dL / Ketone: x  / Bili: x / Urobili: x   Blood: x / Protein: x / Nitrite: x   Leuk Esterase: x / RBC: x / WBC x   Sq Epi: x / Non Sq Epi: x / Bacteria: x

## 2024-04-28 NOTE — PROGRESS NOTE ADULT - ASSESSMENT
ASSESSMENT:  (1)Renal - CKD3-4 - due to diabetes/hypertension - at/near baseline  (2)Rhabdo - likely triggers by meds being used for hyperlipidemia - he is off them for now - on NS trending CPK. Rheum eval to r/o other cause of rhabdomyolysis aside from statin.  (3)CV - at risk for fluid overload from IVF required here for treatment of rhabdo -continue  both IVF and IV diuretics here to drive down the CPK while minimizing risk of associated fluid overload    RECOMMEND:  (1)Change NS 100cc/h to 125cc/hr.             * monitor for s/s of CHF as pt w/ elevated BNP*  (2)Lasix 40mg iv qd as ordered  (3)trending CPK daily  (4)No Zetia/No Lipitor  (5)BMP+Mg daily  (6)Dose new meds for GFR 30-40ml/min  (7)na 132, continue to monitor for now      Discussed w/ MD Myrna Hniojosa, NP   [facility]  Office: [phone]       ASSESSMENT:  (1)Renal - CKD3-4 - due to diabetes/hypertension - at/near baseline  (2)Rhabdo - likely triggers by meds being used for hyperlipidemia - he is off them for now - on NS trending CPK. Rheum eval to r/o other cause of rhabdomyolysis aside from statin.  (3)CV - at risk for fluid overload from IVF required here for treatment of rhabdo -continue  both IVF and IV diuretics here to drive down the CPK while minimizing risk of associated fluid overload    RECOMMEND:  (1)Change NS 100cc/h to 125cc/hr.             * monitor for s/s of CHF as pt w/ elevated BNP*  (2)Lasix 40mg iv qd as ordered  (3)trending CPK daily  (4)No Zetia/No Lipitor  (5)BMP+Mg daily  (6)Dose new meds for GFR 30-40ml/min  (7)na 132, continue to monitor for now      Discussed w/ MD Norris and LEANN Hinojosa NP   [facility]  Office: [phone]

## 2024-04-28 NOTE — PROGRESS NOTE ADULT - SUBJECTIVE AND OBJECTIVE BOX
SUBJECTIVE: Patient still reports weakness in LE's  	  MEDICATIONS  (STANDING):  apixaban 2.5 milliGRAM(s) Oral every 12 hours  aspirin enteric coated 81 milliGRAM(s) Oral daily  chlorhexidine 2% Cloths 1 Application(s) Topical daily  clopidogrel Tablet 75 milliGRAM(s) Oral daily  dextrose 10% Bolus 125 milliLiter(s) IV Bolus once  dextrose 5%. 1000 milliLiter(s) (50 mL/Hr) IV Continuous <Continuous>  dextrose 5%. 1000 milliLiter(s) (100 mL/Hr) IV Continuous <Continuous>  dextrose 50% Injectable 12.5 Gram(s) IV Push once  dextrose 50% Injectable 25 Gram(s) IV Push once  furosemide   Injectable 40 milliGRAM(s) IV Push daily  glucagon  Injectable 1 milliGRAM(s) IntraMuscular once  influenza  Vaccine (HIGH DOSE) 0.7 milliLiter(s) IntraMuscular once  insulin glargine Injectable (LANTUS) 18 Unit(s) SubCutaneous at bedtime  insulin lispro (ADMELOG) corrective regimen sliding scale   SubCutaneous three times a day before meals  insulin lispro (ADMELOG) corrective regimen sliding scale   SubCutaneous at bedtime  insulin lispro Injectable (ADMELOG) 8 Unit(s) SubCutaneous three times a day before meals  isosorbide   dinitrate Tablet (ISORDIL) 30 milliGRAM(s) Oral three times a day  metoprolol tartrate 25 milliGRAM(s) Oral two times a day  polyethylene glycol 3350 17 Gram(s) Oral daily  senna 2 Tablet(s) Oral at bedtime  sodium chloride 0.9%. 1000 milliLiter(s) (100 mL/Hr) IV Continuous <Continuous>    MEDICATIONS  (PRN):  acetaminophen     Tablet .. 650 milliGRAM(s) Oral every 6 hours PRN Temp greater or equal to 38C (100.4F), Mild Pain (1 - 3)  dextrose Oral Gel 15 Gram(s) Oral once PRN Blood Glucose LESS THAN 70 milliGRAM(s)/deciliter            REVIEW OF SYSTEMS:    CONSTITUTIONAL: No fever, weight loss, or fatigue  EYES: No eye pain, visual disturbances, or discharge  NECK: No pain or stiffness  RESPIRATORY: No cough, wheezing, chills or hemoptysis; No Shortness of Breath  CARDIOVASCULAR: No chest pain, palpitations, dizziness, or leg swelling  GASTROINTESTINAL: No abdominal or epigastric pain. No nausea, vomiting, or hematemesis; No diarrhea or constipation. No melena or hematochezia.  GENITOURINARY: No dysuria, frequency, hematuria, or incontinence  NEUROLOGICAL: No headaches, memory loss, loss of strength, numbness, or tremors  SKIN: No itching, burning, rashes, or lesions   LYMPH Nodes: No enlarged glands  MUSCULOSKELETAL: No joint pain or swelling; No muscle, back, or extremity pain  All other review of systems are negative.  	  [ ] Unable to obtain    PHYSICAL EXAM:  T(F): 98.6 (04-28-24 @ 04:54), Max: 99.3 (04-27-24 @ 21:14)  HR: 88 (04-28-24 @ 04:54) (81 - 88)  BP: 114/68 (04-28-24 @ 04:54) (102/62 - 128/68)  RR: 18 (04-28-24 @ 04:54) (18 - 18)  SpO2: 98% (04-28-24 @ 04:54) (97% - 99%)  Wt(kg): --  ,   I&O's Summary    27 Apr 2024 07:01  -  28 Apr 2024 07:00  --------------------------------------------------------  IN: 360 mL / OUT: 1250 mL / NET: -890 mL              PHYSICAL EXAM    Appearance: Normal	  HEENT:   Normal oral mucosa, PERRL, EOMI	  NECK: Soft and supple, No LAD, No JVD  Cardiovascular: Regular Rate and Rhythm, Normal S1 S2, No murmurs, No clicks, gallops or rubs  Respiratory: Lungs clear to auscultation	  Gastrointestinal:  Soft, Non-tender, + BS	  Skin: No rashes, No ecchymoses, No cyanosis  Neurologic: Non-focal  Extremities: No clubbing, cyanosis or edema  Vascular: Peripheral pulses palpable 2+ bilaterally    TELEMETRY: AF with moderate ventricular response	    	    LABS:	 	    CARDIAC MARKERS:  Troponin T,  serum   506 (04-25 @ 19:42)  505 (04-25 @ 19:07)  594 (04-17 @ 06:48)  347 (04-16 @ 21:53)  96 (04-16 @ 05:44)  87 (04-16 @ 02:40)    Creatine Kinase, Serum in AM (04.28.24 @ 06:19)   Creatine Kinase, Serum: 66408 U/L  Creatine Kinase, Serum in AM (04.27.24 @ 07:11)   Creatine Kinase, Serum: 67214 U/L  Creatine Kinase, Serum in AM (04.26.24 @ 06:25)   Creatine Kinase, Serum: 88110 U/L                            11.1   16.17 )-----------( 446      ( 27 Apr 2024 07:11 )             36.1     04-26    132<L>  |  100  |  60<H>  ----------------------------<  146<H>  4.4   |  20<L>  |  1.76<H>    Ca    8.7      26 Apr 2024 06:25  Phos  4.0     04-26  Mg     2.6     04-26    TPro  5.2<L>  /  Alb  2.5<L>  /  TBili  1.0  /  DBili  x   /  AST  1112<H>  /  ALT  611<H>  /  AlkPhos  95  04-26  ------------------------------------------------------------------------------------------------------------------------------------                          10.9   16.71 )-----------( 414      ( 28 Apr 2024 06:22 )             33.0               04-28    132<L>  |  102  |  75<H>  ----------------------------<  112<H>  4.6   |  17<L>  |  1.93<H>    Ca    8.6      28 Apr 2024 06:19  Phos  4.0     04-27  Mg     2.3     04-27    TPro  5.3<L>  /  Alb  2.8<L>  /  TBili  0.7  /  DBili  x   /  AST  1094<H>  /  ALT  693<H>  /  AlkPhos  110  04-28           CARDIAC MARKERS ( 28 Apr 2024 06:19 )  x     / x     / 12059 U/L / x     / x      CARDIAC MARKERS ( 27 Apr 2024 07:11 )  x     / x     / 19429 U/L / x     / x                  Urinalysis Basic - ( 28 Apr 2024 06:19 )    Color: x / Appearance: x / SG: x / pH: x  Gluc: 112 mg/dL / Ketone: x  / Bili: x / Urobili: x   Blood: x / Protein: x / Nitrite: x   Leuk Esterase: x / RBC: x / WBC x   Sq Epi: x / Non Sq Epi: x / Bacteria: x

## 2024-04-28 NOTE — CHART NOTE - NSCHARTNOTEFT_GEN_A_CORE
Rheumatology consult called per MD Santana for elevation in CK.  Rheumatology to rule out inflammatory myopathy.  Recc: labs, MRI thighs STIR protocol, probable muscle biopsy and Solumedrol 20mg BID.  Ok from cards to start steroids.  Endo already following pt. Will monitor blood glucose and titrate insulin as needed.

## 2024-04-29 LAB
ADD ON TEST-SPECIMEN IN LAB: SIGNIFICANT CHANGE UP
ALBUMIN SERPL ELPH-MCNC: 2.6 G/DL — LOW (ref 3.3–5)
ALP SERPL-CCNC: 128 U/L — HIGH (ref 40–120)
ALT FLD-CCNC: 752 U/L — HIGH (ref 10–45)
ANION GAP SERPL CALC-SCNC: 14 MMOL/L — SIGNIFICANT CHANGE UP (ref 5–17)
AST SERPL-CCNC: 909 U/L — HIGH (ref 10–40)
BILIRUB SERPL-MCNC: 0.7 MG/DL — SIGNIFICANT CHANGE UP (ref 0.2–1.2)
BUN SERPL-MCNC: 79 MG/DL — HIGH (ref 7–23)
CALCIUM SERPL-MCNC: 9.1 MG/DL — SIGNIFICANT CHANGE UP (ref 8.4–10.5)
CHLORIDE SERPL-SCNC: 104 MMOL/L — SIGNIFICANT CHANGE UP (ref 96–108)
CK SERPL-CCNC: CRITICAL HIGH U/L (ref 30–200)
CO2 SERPL-SCNC: 13 MMOL/L — LOW (ref 22–31)
CREAT SERPL-MCNC: 1.78 MG/DL — HIGH (ref 0.5–1.3)
CRP SERPL-MCNC: 79 MG/L — HIGH (ref 0–4)
EGFR: 41 ML/MIN/1.73M2 — LOW
ERYTHROCYTE [SEDIMENTATION RATE] IN BLOOD: 37 MM/HR — HIGH (ref 0–20)
GLUCOSE BLDC GLUCOMTR-MCNC: 271 MG/DL — HIGH (ref 70–99)
GLUCOSE BLDC GLUCOMTR-MCNC: 352 MG/DL — HIGH (ref 70–99)
GLUCOSE BLDC GLUCOMTR-MCNC: 370 MG/DL — HIGH (ref 70–99)
GLUCOSE BLDC GLUCOMTR-MCNC: 376 MG/DL — HIGH (ref 70–99)
GLUCOSE SERPL-MCNC: 306 MG/DL — HIGH (ref 70–99)
HBV CORE AB SER-ACNC: SIGNIFICANT CHANGE UP
HBV SURFACE AB SER-ACNC: SIGNIFICANT CHANGE UP
HCT VFR BLD CALC: 38.2 % — LOW (ref 39–50)
HGB BLD-MCNC: 11.7 G/DL — LOW (ref 13–17)
IGA FLD-MCNC: 248 MG/DL — SIGNIFICANT CHANGE UP (ref 84–499)
IGG FLD-MCNC: 505 MG/DL — LOW (ref 610–1660)
IGM SERPL-MCNC: 36 MG/DL — SIGNIFICANT CHANGE UP (ref 35–242)
KAPPA LC SER QL IFE: 4.87 MG/DL — HIGH (ref 0.33–1.94)
KAPPA/LAMBDA FREE LIGHT CHAIN RATIO, SERUM: 1.1 RATIO — SIGNIFICANT CHANGE UP (ref 0.26–1.65)
LAMBDA LC SER QL IFE: 4.43 MG/DL — HIGH (ref 0.57–2.63)
MAGNESIUM SERPL-MCNC: 2.5 MG/DL — SIGNIFICANT CHANGE UP (ref 1.6–2.6)
MCHC RBC-ENTMCNC: 27.3 PG — SIGNIFICANT CHANGE UP (ref 27–34)
MCHC RBC-ENTMCNC: 30.6 GM/DL — LOW (ref 32–36)
MCV RBC AUTO: 89.3 FL — SIGNIFICANT CHANGE UP (ref 80–100)
NRBC # BLD: 0 /100 WBCS — SIGNIFICANT CHANGE UP (ref 0–0)
PLATELET # BLD AUTO: 475 K/UL — HIGH (ref 150–400)
POTASSIUM SERPL-MCNC: 5.3 MMOL/L — SIGNIFICANT CHANGE UP (ref 3.5–5.3)
POTASSIUM SERPL-SCNC: 5.3 MMOL/L — SIGNIFICANT CHANGE UP (ref 3.5–5.3)
PROT SERPL-MCNC: 5.6 G/DL — LOW (ref 6–8.3)
PSA FLD-MCNC: 0.69 NG/ML — SIGNIFICANT CHANGE UP (ref 0–4)
RBC # BLD: 4.28 M/UL — SIGNIFICANT CHANGE UP (ref 4.2–5.8)
RBC # FLD: 16.2 % — HIGH (ref 10.3–14.5)
SODIUM SERPL-SCNC: 131 MMOL/L — LOW (ref 135–145)
TROPONIN T, HIGH SENSITIVITY RESULT: 298 NG/L — HIGH (ref 0–51)
WBC # BLD: 19.57 K/UL — HIGH (ref 3.8–10.5)
WBC # FLD AUTO: 19.57 K/UL — HIGH (ref 3.8–10.5)

## 2024-04-29 PROCEDURE — 70490 CT SOFT TISSUE NECK W/O DYE: CPT | Mod: 26

## 2024-04-29 PROCEDURE — 93010 ELECTROCARDIOGRAM REPORT: CPT | Mod: 77

## 2024-04-29 PROCEDURE — 72195 MRI PELVIS W/O DYE: CPT | Mod: 26

## 2024-04-29 PROCEDURE — 99232 SBSQ HOSP IP/OBS MODERATE 35: CPT

## 2024-04-29 PROCEDURE — 71250 CT THORAX DX C-: CPT | Mod: 26

## 2024-04-29 PROCEDURE — 99223 1ST HOSP IP/OBS HIGH 75: CPT

## 2024-04-29 PROCEDURE — 99232 SBSQ HOSP IP/OBS MODERATE 35: CPT | Mod: GC

## 2024-04-29 PROCEDURE — 74176 CT ABD & PELVIS W/O CONTRAST: CPT | Mod: 26

## 2024-04-29 PROCEDURE — 93010 ELECTROCARDIOGRAM REPORT: CPT

## 2024-04-29 RX ORDER — SODIUM BICARBONATE 1 MEQ/ML
0.13 SYRINGE (ML) INTRAVENOUS
Qty: 75 | Refills: 0 | Status: DISCONTINUED | OUTPATIENT
Start: 2024-04-29 | End: 2024-05-01

## 2024-04-29 RX ORDER — NITROGLYCERIN 6.5 MG
0.4 CAPSULE, EXTENDED RELEASE ORAL
Refills: 0 | Status: DISCONTINUED | OUTPATIENT
Start: 2024-04-29 | End: 2024-05-01

## 2024-04-29 RX ORDER — SODIUM CHLORIDE 9 MG/ML
1000 INJECTION INTRAMUSCULAR; INTRAVENOUS; SUBCUTANEOUS
Refills: 0 | Status: DISCONTINUED | OUTPATIENT
Start: 2024-04-29 | End: 2024-04-29

## 2024-04-29 RX ORDER — INSULIN LISPRO 100/ML
10 VIAL (ML) SUBCUTANEOUS
Refills: 0 | Status: DISCONTINUED | OUTPATIENT
Start: 2024-04-29 | End: 2024-04-30

## 2024-04-29 RX ORDER — INSULIN GLARGINE 100 [IU]/ML
26 INJECTION, SOLUTION SUBCUTANEOUS AT BEDTIME
Refills: 0 | Status: DISCONTINUED | OUTPATIENT
Start: 2024-04-29 | End: 2024-04-30

## 2024-04-29 RX ADMIN — Medication 20 MILLIGRAM(S): at 05:53

## 2024-04-29 RX ADMIN — Medication 40 MILLIGRAM(S): at 05:50

## 2024-04-29 RX ADMIN — Medication 0.4 MILLIGRAM(S): at 11:21

## 2024-04-29 RX ADMIN — ISOSORBIDE DINITRATE 30 MILLIGRAM(S): 5 TABLET ORAL at 17:40

## 2024-04-29 RX ADMIN — INSULIN GLARGINE 26 UNIT(S): 100 INJECTION, SOLUTION SUBCUTANEOUS at 22:10

## 2024-04-29 RX ADMIN — APIXABAN 2.5 MILLIGRAM(S): 2.5 TABLET, FILM COATED ORAL at 05:48

## 2024-04-29 RX ADMIN — Medication 100 MEQ/KG/HR: at 12:14

## 2024-04-29 RX ADMIN — CLOPIDOGREL BISULFATE 75 MILLIGRAM(S): 75 TABLET, FILM COATED ORAL at 11:28

## 2024-04-29 RX ADMIN — Medication 8 UNIT(S): at 08:26

## 2024-04-29 RX ADMIN — Medication 3: at 08:26

## 2024-04-29 RX ADMIN — Medication 10 UNIT(S): at 17:39

## 2024-04-29 RX ADMIN — Medication 25 MILLIGRAM(S): at 05:50

## 2024-04-29 RX ADMIN — Medication 25 MILLIGRAM(S): at 20:19

## 2024-04-29 RX ADMIN — ISOSORBIDE DINITRATE 30 MILLIGRAM(S): 5 TABLET ORAL at 05:49

## 2024-04-29 RX ADMIN — APIXABAN 2.5 MILLIGRAM(S): 2.5 TABLET, FILM COATED ORAL at 17:40

## 2024-04-29 RX ADMIN — Medication 3: at 22:11

## 2024-04-29 RX ADMIN — Medication 100 MEQ/KG/HR: at 22:09

## 2024-04-29 RX ADMIN — Medication 20 MILLIGRAM(S): at 17:40

## 2024-04-29 RX ADMIN — CHLORHEXIDINE GLUCONATE 1 APPLICATION(S): 213 SOLUTION TOPICAL at 11:36

## 2024-04-29 RX ADMIN — Medication 10 UNIT(S): at 12:13

## 2024-04-29 RX ADMIN — ISOSORBIDE DINITRATE 30 MILLIGRAM(S): 5 TABLET ORAL at 11:27

## 2024-04-29 RX ADMIN — Medication 5: at 12:13

## 2024-04-29 RX ADMIN — Medication 5: at 17:38

## 2024-04-29 NOTE — PROGRESS NOTE ADULT - SUBJECTIVE AND OBJECTIVE BOX
Chief complaint  Patient is a 69y old  Male who presents with a chief complaint of Rhabdo (29 Apr 2024 15:20)         Labs and Fingersticks  CAPILLARY BLOOD GLUCOSE      POCT Blood Glucose.: 376 mg/dL (29 Apr 2024 11:57)  POCT Blood Glucose.: 271 mg/dL (29 Apr 2024 08:09)  POCT Blood Glucose.: 265 mg/dL (28 Apr 2024 21:46)  POCT Blood Glucose.: 199 mg/dL (28 Apr 2024 17:10)      Anion Gap: 14 (04-29 @ 06:14)  Anion Gap: 13 (04-28 @ 06:19)      Calcium: 9.1 (04-29 @ 06:14)  Calcium: 8.6 (04-28 @ 06:19)  Albumin: 2.6 *L* (04-29 @ 06:14)  Albumin: 2.8 *L* (04-28 @ 06:19)    Alanine Aminotransferase (ALT/SGPT): 752 *H* (04-29 @ 06:14)  Alanine Aminotransferase (ALT/SGPT): 693 *H* (04-28 @ 06:19)  Alkaline Phosphatase: 128 *H* (04-29 @ 06:14)  Alkaline Phosphatase: 110 (04-28 @ 06:19)  Aspartate Aminotransferase (AST/SGOT): 909 *H* (04-29 @ 06:14)  Aspartate Aminotransferase (AST/SGOT): 1094 *H* (04-28 @ 06:19)        04-29    131<L>  |  104  |  79<H>  ----------------------------<  306<H>  5.3   |  13<L>  |  1.78<H>    Ca    9.1      29 Apr 2024 06:14  Mg     2.5     04-29    TPro  5.6<L>  /  Alb  2.6<L>  /  TBili  0.7  /  DBili  x   /  AST  909<H>  /  ALT  752<H>  /  AlkPhos  128<H>  04-29                        11.7   19.57 )-----------( 475      ( 29 Apr 2024 06:14 )             38.2     Medications  MEDICATIONS  (STANDING):  apixaban 2.5 milliGRAM(s) Oral every 12 hours  chlorhexidine 2% Cloths 1 Application(s) Topical daily  clopidogrel Tablet 75 milliGRAM(s) Oral daily  dextrose 10% Bolus 125 milliLiter(s) IV Bolus once  dextrose 5%. 1000 milliLiter(s) (50 mL/Hr) IV Continuous <Continuous>  dextrose 5%. 1000 milliLiter(s) (100 mL/Hr) IV Continuous <Continuous>  dextrose 50% Injectable 12.5 Gram(s) IV Push once  dextrose 50% Injectable 25 Gram(s) IV Push once  furosemide   Injectable 40 milliGRAM(s) IV Push daily  glucagon  Injectable 1 milliGRAM(s) IntraMuscular once  influenza  Vaccine (HIGH DOSE) 0.7 milliLiter(s) IntraMuscular once  insulin glargine Injectable (LANTUS) 26 Unit(s) SubCutaneous at bedtime  insulin lispro (ADMELOG) corrective regimen sliding scale   SubCutaneous three times a day before meals  insulin lispro (ADMELOG) corrective regimen sliding scale   SubCutaneous at bedtime  insulin lispro Injectable (ADMELOG) 10 Unit(s) SubCutaneous three times a day before meals  isosorbide   dinitrate Tablet (ISORDIL) 30 milliGRAM(s) Oral three times a day  methylPREDNISolone sodium succinate Injectable 20 milliGRAM(s) IV Push two times a day  metoprolol tartrate 25 milliGRAM(s) Oral two times a day  polyethylene glycol 3350 17 Gram(s) Oral daily  senna 2 Tablet(s) Oral at bedtime  sodium bicarbonate  Infusion 0.127 mEq/kG/Hr (100 mL/Hr) IV Continuous <Continuous>      Physical Exam  General: Patient comfortable in bed   Vital Signs Last 12 Hrs  T(F): 97.5 (04-29-24 @ 05:37), Max: 97.5 (04-29-24 @ 05:37)  HR: 93 (04-29-24 @ 10:52) (82 - 93)  BP: 152/83 (04-29-24 @ 10:52) (109/60 - 152/83)  BP(mean): --  RR: 18 (04-29-24 @ 10:52) (18 - 18)  SpO2: 97% (04-29-24 @ 10:52) (97% - 99%)    CVS: S1S2   Respiratory: No wheezing, no crepitations  GI: Abdomen soft, bowel sounds positive  Musculoskeletal:  moves all extremities  : Voiding

## 2024-04-29 NOTE — CHART NOTE - NSCHARTNOTEFT_GEN_A_CORE
Patient c/o chest pain "across his entire chest". Had similar episode this morning and EKG was unchanged. Patient refusing to perform another EKG. Says he does not want anything, his chest pain, "will go away by itself". Patient c/o chest pain "across his entire chest". Had similar episode this morning and EKG was unchanged. Patient refusing to perform another EKG. Says he does not want anything, his chest pain, "will go away by itself".    D/w cardiologist Dr. Santana

## 2024-04-29 NOTE — PROGRESS NOTE ADULT - ASSESSMENT
70 y/o male with hx of CAD s/p multiple  stents (most recent to pLM (70%), pLAD, mLAD on 1/8/24 with Dr. Dominguez), HFrEF , A-fib, HTN, HLD, T2DM,  CKD3, former smoker  Assessment  DMT2: 69y Male with DM T2 with hyperglycemia, A1C 7.6% , was on oral meds and insulin at home (Farxiga and Semglee basal insulin), now on basal  bolus insulins, sugars elevated.  was started on steroids, having steroid induced hyperglycemias  CAD: on medications, stable, monitored.   HTN: on antihypertensive medications, monitored, asymptomatic.  CKD: Monitor labs/BMP, renal follow up    Discussed plan and management with Attending   Jcarlos Jamil NP - TEAMS

## 2024-04-29 NOTE — PROGRESS NOTE ADULT - ASSESSMENT
68 y/o man      h/o severe CAD S/P multiple PCI's including multiple LM and LAD stents.       +A-fib, HTN, HLD, T2DM, CKD3, former smoker     presents with  weaknss/   leg  pains.  no leg  weakness      + troponin /  elevated  cpk      CAD,  has   10  stents/  HTN/  HLD        cath,   4/18,  2.0 mm caliber TITO to distal LAD,Dr ILDA Dominguez        on  dapt/  ef   47     troponin,  likely   demand  ischemia.  not  mi  per  dr arellano,,  not a  candidate   for  further intervention,   small  caliber  vessels   c/c  Afib,  on eliquis      DM,  follow  fs/  endo  dr durant   CKD    rhabdomyolysis,/  elevated  CPK,    suspect  ?  statin induced.   and   statin  on hold    elevated lfts,   abd  u/s,  normal liver    doppler  legs.  with PAD/  will  f/p  a s an out pt with vascular. when stable   on asa/  eliquis, plavix, / lasix, imdur.  valsartan.  toprol      hold   Farxiga   and  valsartan for  now,  iv fluids,  per  renal   c/c  anemia     elevated  wbc.  afebrile,  doubt  infectious. and  no ab per  ID     follow   lfts/ cpk,   nee d to  r/o  polymyositis, vs  drug induced   myositis,      w/p per  rheum /         mri thigh/  will  need  bx      lfts  and  cpk . mild  decerase. on iv steroid.,  rheum following          rad< from: TTE W or WO Ultrasound Enhancing Agent (01.29.24 @ 18:15) >  ONCLUSIONS:   1. Left ventricular cavity is small. Left ventricular wall thickness is normal. Left ventricular systolic function is normal with an ejection fraction of 47 % by Ulloa's method of disks.   2. Unable to evaluate wall motion due to poor image quality. There is possible apical hypokinesis.   3. There is severe (grade 3) left ventricular diastolic dysfunction, with elevated filling pressure.   4. Normal right ventricular cavity size and normalsystolic function.   5. There is mild tricuspid regurgitation. Estimated pulmonary artery systolic pressure is 23 mmHg.   6. Compared to the transthoracic echocardiogram performed on 11/26/2023, there have been no significant interval changes.  ____________________________________________________________________  Recommendations:  If clincially indicated, patient should return for repeat echocardiogram with contrast agent, for enhanced left ventricular opacification and improved delineation of the left ventricular endocardial borders. Would suggest a limited study with UEA/ contrast for better delineation of wall motion abnormalities.  ________________________________________________________________________________________  FINDINGS:  < end of copied text >         rad    -  70 y/o man      h/o severe CAD S/P multiple PCI's including multiple LM and LAD stents.       +A-fib, HTN, HLD, T2DM, CKD3, former smoker     presents with  weaknss/   leg  pains.  no leg  weakness      + troponin /  elevated  cpk      CAD,  has   10  stents/  HTN/  HLD        cath,   4/18,  2.0 mm caliber TITO to distal LAD,Dr ILDA Dominguez        on  dapt/  ef   49     troponin,  likely   demand  ischemia.  not  mi  per  dr arellano,,  not a  candidate   for  further intervention,   small  caliber  vessels   c/c  Afib,  on eliquis      DM,  follow  fs/  endo  dr durant   CKD    rhabdomyolysis,/  elevated  CPK,    suspect  ?  statin induced.   and   statin  on hold    elevated lfts,   abd  u/s,  normal liver    doppler  legs.  with PAD/  will  f/p  a s an out pt with vascular. when stable   on asa/  eliquis, plavix, / lasix, imdur.  valsartan.  toprol      hold   Farxiga   and  valsartan for  now,  iv fluids,  per  renal   c/c  anemia     elevated  wbc.  afebrile,  doubt  infectious. and  no ab per  ID     follow   lfts/ cpk,   nee d to  r/o  polymyositis, vs  drug induced   myositis,      w/p per  rheum ,  ct  a/p,  an d mri  thigh         mri thigh/  will  need  bx      lfts  and  cpk . mild  decrease  on iv steroid.,  rheum following          rad< from: TTE W or WO Ultrasound Enhancing Agent (01.29.24 @ 18:15) >  ONCLUSIONS:   1. Left ventricular cavity is small. Left ventricular wall thickness is normal. Left ventricular systolic function is normal with an ejection fraction of 47 % by Ulloa's method of disks.   2. Unable to evaluate wall motion due to poor image quality. There is possible apical hypokinesis.   3. There is severe (grade 3) left ventricular diastolic dysfunction, with elevated filling pressure.   4. Normal right ventricular cavity size and normalsystolic function.   5. There is mild tricuspid regurgitation. Estimated pulmonary artery systolic pressure is 23 mmHg.   6. Compared to the transthoracic echocardiogram performed on 11/26/2023, there have been no significant interval changes.  ____________________________________________________________________  Recommendations:  If clincially indicated, patient should return for repeat echocardiogram with contrast agent, for enhanced left ventricular opacification and improved delineation of the left ventricular endocardial borders. Would suggest a limited study with UEA/ contrast for better delineation of wall motion abnormalities.  ________________________________________________________________________________________  FINDINGS:  < end of copied text >         rad    -

## 2024-04-29 NOTE — CONSULT NOTE ADULT - ASSESSMENT
ASSESSMENT:  Jean Claude Velázquez is a 69 y.o. man with history of HTN, HLD, DM2, CAD c/b NSTEMI (4/16) s/p 10 stents (last 1 week prior to admission, Plavix), Afib on Eliquis, CHF, and CKD who is currently admitted for evaluation of rhabdomyolysis. Surgery consultation placed for muscle biopsy.       PLAN:  -      ASSESSMENT:  Jean Claude Velázquez is a 69 y.o. man with history of HTN, HLD, DM2, CAD c/b NSTEMI (4/16) s/p 10 stents (last 1 week prior to admission, Plavix), Afib on Eliquis, CHF, and CKD who is currently admitted for evaluation of rhabdomyolysis. Surgery consultation placed for muscle biopsy.       PLAN:  - Please document ability to hold Plavix and Eliquis for operating  - Please document specific location that would be target for biopsy  - Deeper muscle would be significantly higher risk for post-operative bleeding, must be a superficial site  - Plan discussed with Dr. Isidro Briseno      ACS/Trauma  x [phone]

## 2024-04-29 NOTE — CONSULT NOTE ADULT - SUBJECTIVE AND OBJECTIVE BOX
Vascular Surgery Consult    Consulting attending: Eric       HPI: Jean Claude Velázquez is a 69 y.o. man with history of HTN, HLD, DM2, CAD c/b NSTEMI (4/16) s/p 10 stents (last 1 week prior to admission, Plavix), Afib on Eliquis, CHF, and CKD who is currently admitted for evaluation of rhabdomyolysis. Vascular surgery consultation to rule out PAD.       PAST MEDICAL HISTORY:  Carotid Stenosis  Diabetes Mellitus  Neuropathy  Former smoker  CAD in native artery  Hyperlipidemia, unspecified hyperlipidemia type  Essential hypertension, hypertension with unspecified goal  Afib  CHF (congestive heart failure)      PAST SURGICAL HISTORY:  s/p Carotid Endarterectomy      MEDICATIONS:  acetaminophen     Tablet .. 650 milliGRAM(s) Oral every 6 hours PRN  apixaban 2.5 milliGRAM(s) Oral every 12 hours  chlorhexidine 2% Cloths 1 Application(s) Topical daily  clopidogrel Tablet 75 milliGRAM(s) Oral daily  dextrose 10% Bolus 125 milliLiter(s) IV Bolus once  dextrose 5%. 1000 milliLiter(s) IV Continuous <Continuous>  dextrose 5%. 1000 milliLiter(s) IV Continuous <Continuous>  dextrose 50% Injectable 12.5 Gram(s) IV Push once  dextrose 50% Injectable 25 Gram(s) IV Push once  dextrose Oral Gel 15 Gram(s) Oral once PRN  furosemide   Injectable 40 milliGRAM(s) IV Push daily  glucagon  Injectable 1 milliGRAM(s) IntraMuscular once  influenza  Vaccine (HIGH DOSE) 0.7 milliLiter(s) IntraMuscular once  insulin glargine Injectable (LANTUS) 26 Unit(s) SubCutaneous at bedtime  insulin lispro (ADMELOG) corrective regimen sliding scale   SubCutaneous three times a day before meals  insulin lispro (ADMELOG) corrective regimen sliding scale   SubCutaneous at bedtime  insulin lispro Injectable (ADMELOG) 10 Unit(s) SubCutaneous three times a day before meals  isosorbide   dinitrate Tablet (ISORDIL) 30 milliGRAM(s) Oral three times a day  methylPREDNISolone sodium succinate Injectable 20 milliGRAM(s) IV Push two times a day  metoprolol tartrate 25 milliGRAM(s) Oral two times a day  nitroglycerin     SubLingual 0.4 milliGRAM(s) SubLingual every 5 minutes PRN  polyethylene glycol 3350 17 Gram(s) Oral daily  senna 2 Tablet(s) Oral at bedtime  sodium bicarbonate  Infusion 0.127 mEq/kG/Hr IV Continuous <Continuous>      ALLERGIES:  atorvastatin (Muscle Pain (Severe))      VITALS & I/Os:  Vital Signs Last 24 Hrs  T(C): 36.7 (29 Apr 2024 17:47), Max: 36.8 (28 Apr 2024 20:44)  T(F): 98 (29 Apr 2024 17:47), Max: 98.2 (28 Apr 2024 20:44)  HR: 99 (29 Apr 2024 17:47) (77 - 99)  BP: 101/67 (29 Apr 2024 17:47) (101/67 - 152/83)  BP(mean): --  RR: 18 (29 Apr 2024 17:47) (18 - 18)  SpO2: 98% (29 Apr 2024 17:47) (97% - 99%)    Parameters below as of 29 Apr 2024 17:47  Patient On (Oxygen Delivery Method): room air      I&O's Summary  28 Apr 2024 07:01  -  29 Apr 2024 07:00  --------------------------------------------------------  IN: 1780 mL / OUT: 2500 mL / NET: -720 mL    29 Apr 2024 07:01  -  29 Apr 2024 20:02  --------------------------------------------------------  IN: 480 mL / OUT: 1450 mL / NET: -970 mL          PHYSICAL EXAM:  General: No acute distress  Respiratory: Nonlabored  Cardiovascular: borderline hypotension, regular rate   Abdominal: Soft, nondistended, nontender. No rebound or guarding. No organomegaly, no palpable mass.  Extremities: Warm, no wound, motor and sensation intact and symmetric BLE, 2+ pitting edema BL extending above knee   Vascular:  - RLE: palp fem, DP + PT signal  - LLE: palp fem, DP + PT signal       LABS:                        11.7   19.57 )-----------( 475      ( 29 Apr 2024 06:14 )             38.2     04-29    131<L>  |  104  |  79<H>  ----------------------------<  306<H>  5.3   |  13<L>  |  1.78<H>    Ca    9.1      29 Apr 2024 06:14  Mg     2.5     04-29    TPro  5.6<L>  /  Alb  2.6<L>  /  TBili  0.7  /  DBili  x   /  AST  909<H>  /  ALT  752<H>  /  AlkPhos  128<H>  04-29    Lactate:        CARDIAC MARKERS ( 29 Apr 2024 06:14 )  x     / x     / 17443 U/L / x     / x      CARDIAC MARKERS ( 28 Apr 2024 06:19 )  x     / x     / 97268 U/L / x     / x            Urinalysis Basic - ( 29 Apr 2024 06:14 )    Color: x / Appearance: x / SG: x / pH: x  Gluc: 306 mg/dL / Ketone: x  / Bili: x / Urobili: x   Blood: x / Protein: x / Nitrite: x   Leuk Esterase: x / RBC: x / WBC x   Sq Epi: x / Non Sq Epi: x / Bacteria: x      IMAGING:  VA Arterial Duplex:  The peak systolic velocities of the RIGHT lower extremity are as follows:    Distal external iliac artery: 125 cm/s  Common femoral artery: 143 cm/s  Deep femoral artery: 102 cm/s  Superficial femoral artery: 100 cm/s proximal,  108 cm/s mid, 93 cm/s   distal  Popliteal artery: 175 cm/s proximal, occluded distal  Tibioperoneal trunk: 16 cm/s  Posterior tibial artery: 13 cm/s proximal,  15 cm/s mid, 19 cm/s distal  Peroneal artery: 18 cm/s proximal,  15 cm/s mid, 11 cm/s distal  Anterior tibial artery: 25 cm/s proximal,  17 cm/s mid, 14 cm/s distal  Dorsalis pedis artery: 10 cm/s    The peak systolic velocities of the LEFT lower extremity are as follows:    Distal external iliac artery: 120 cm/s  Common femoral artery: 139 cm/s  Deep femoral artery: 97 cm/s  Superficial femoral artery: 101 cm/s proximal,  96 cm/s mid, 96 cm/s   distal  Popliteal artery: 42 cm/s  Tibioperoneal trunk: 385 cm/s  Posterior tibial artery: 52 cm/s proximal,  55 cm/s mid, 56 cm/s distal  Peroneal artery: 23 cm/s proximal,  9 cm/s mid, 21 cm/s distal  Anterior tibial artery: 23 cm/s proximal,  19 cm/s mid, 16 cm/s distal  Dorsalis pedis artery: 9 cm/s      IMPRESSION:    Stenosis/occlusion of the right popliteal artery with markedly diminished   arterial blood flow velocities in the infrapopliteal vessels.    High-grade stenosis in the left tibioperoneal trunk with diminished   arterial blood flow velocities in the infrapopliteal vessels.    Examination findings were conveyed to physician's assistant Gopal by   vascular technologist Destini at 1400 hours on 4/26/2024 with read back.    VA Duplex Venous US:  RIGHT:  Normal compressibility of the RIGHT common femoral, femoral and popliteal   veins.  Doppler examination shows normal spontaneous and phasic flow.  No RIGHT calf vein thrombosis is detected.    LEFT:  Normal compressibility of the LEFT common femoral, femoral and popliteal   veins.  Doppler examination shows normal spontaneous and phasic flow.  No LEFT calf vein thrombosis is detected.    IMPRESSION:  No evidence of deep venous thrombosis in either lower extremity.

## 2024-04-29 NOTE — CHART NOTE - NSCHARTNOTEFT_GEN_A_CORE
MEDICINE NP    Notified by RN patient with temperature . Seen and examined patient at bedside. Patient is alert, NAD. Denies HA, CP, SOB, cough, N/V, or abd pain.    VITAL SIGNS:  Vital Signs Last 24 Hrs  T(C): 36.4 (29 Apr 2024 05:37), Max: 37.1 (28 Apr 2024 12:28)  T(F): 97.5 (29 Apr 2024 05:37), Max: 98.7 (28 Apr 2024 12:28)  HR: 82 (29 Apr 2024 05:37) (77 - 98)  BP: 109/60 (29 Apr 2024 05:37) (102/65 - 128/57)  BP(mean): --  RR: 18 (29 Apr 2024 05:37) (18 - 18)  SpO2: 99% (29 Apr 2024 05:37) (96% - 99%)      LABORATORY:                          11.7   19.57 )-----------( 475      ( 29 Apr 2024 06:14 )             38.2       04-28    132<L>  |  102  |  75<H>  ----------------------------<  112<H>  4.6   |  17<L>  |  1.93<H>    Ca    8.6      28 Apr 2024 06:19  Phos  4.0     04-27  Mg     2.3     04-27    TPro  5.3<L>  /  Alb  2.8<L>  /  TBili  0.7  /  DBili  x   /  AST  1094<H>  /  ALT  693<H>  /  AlkPhos  110  04-28          MICROBIOLOGY:           RADIOLOGY:          PHYSICAL EXAM:    Constitutional: AOx3. NAD.    Respiratory: clear lungs bilaterally. No wheezing, rhonchi, or crackles.    Cardiovascular: S1 S2. No murmurs.    Gastrointestinal: BS X4 active. soft. nontender.    Extremities/Vascular: +2 pulses bilaterally. No BLE edema.      ASSESSMENT/PLAN:   HPI:  Pt is a 70yo M w/ PMH of HTN, HLD, DM2, CAD s/p 10 stents (last 1 week ago), Afib on Eliquis, HFrEF, CKD III pw LE pain/cramping.    Pt recently admitted 04/16-04/19 for NSTEMI s/p LHC w/ stent placement. He now reports b/l LE pain, stiffness and cramping w/ difficulty ambulating or even crossing legs for the past 7-8 days since discharge from the hospital. He also now reports dark urine.     He also reports some mild pressure like CP since discharge for which he followed up w/ his cardiologist outpt and noted to be likely i/s/o recent cath and to c/w Isosorbide.     Today he went to PMD and i/s/o weakness in LE and tachycardia, he was advised to present to the ED.    In the ED VSS w/ labs notable for leukocytosis to 15 and Cr 1.92 (~baseline) w/ elevated liver enzymes, trop 505 -> 506, [place] [address]. He was administered 2L IVF.      (25 Apr 2024 23:00)        1) Neutropenic Fever  -tylenol and cooling measures prn for pyrexia  -BC x2, UA/UC  -CXR  -c/w   -F/U primary team in AM      JOSE Lou-[facility] MEDICINE NP-EPISODIC NOTE    Notified by RN patient c/p of chest pain with exertion now improved. Seen and examined patient at bedside. Patient is alert, NAD; sitting in chair. States has chest pain when he exerts self now feels much better. Denies HA, CP, SOB, cough, N/V, or abd pain.    VITAL SIGNS:  Vital Signs Last 24 Hrs  T(C): 36.4 (29 Apr 2024 05:37), Max: 37.1 (28 Apr 2024 12:28)  T(F): 97.5 (29 Apr 2024 05:37), Max: 98.7 (28 Apr 2024 12:28)  HR: 82 (29 Apr 2024 05:37) (77 - 98)  BP: 109/60 (29 Apr 2024 05:37) (102/65 - 128/57)  BP(mean): --  RR: 18 (29 Apr 2024 05:37) (18 - 18)  SpO2: 99% (29 Apr 2024 05:37) (96% - 99%)      LABORATORY:                          11.7   19.57 )-----------( 475      ( 29 Apr 2024 06:14 )             38.2       04-28    132<L>  |  102  |  75<H>  ----------------------------<  112<H>  4.6   |  17<L>  |  1.93<H>    Ca    8.6      28 Apr 2024 06:19  Phos  4.0     04-27  Mg     2.3     04-27    TPro  5.3<L>  /  Alb  2.8<L>  /  TBili  0.7  /  DBili  x   /  AST  1094<H>  /  ALT  693<H>  /  AlkPhos  110  04-28          MICROBIOLOGY:           RADIOLOGY:          PHYSICAL EXAM:    Constitutional: AOx3. NAD.    Respiratory: clear lungs bilaterally. No wheezing, rhonchi, or crackles.    Cardiovascular: S1 S2. No murmurs.    Gastrointestinal: BS X4 active. soft. nontender.    Extremities/Vascular: +2 pulses bilaterally. No BLE edema.      ASSESSMENT/PLAN:     Pt is a 68yo M w/ PMH of HTN, HLD, DM2, CAD s/p 10 stents (last 1 week ago), Afib on Eliquis, HFrEF, CKD III pw LE pain/cramping.    Pt recently admitted 04/16-04/19 for NSTEMI s/p LHC w/ stent placement. He now reports b/l LE pain, stiffness and cramping w/ difficulty ambulating or even crossing legs for the past 7-8 days since discharge from the hospital. He also now reports dark urine.     He also reports some mild pressure like CP since discharge for which he followed up w/ his cardiologist outpt and noted to be likely i/s/o recent cath and to c/w Isosorbide.     Today he went to PMD and i/s/o weakness in LE and tachycardia, he was advised to present to the ED.    In the ED VSS w/ labs notable for leukocytosis to 15 and Cr 1.92 (~baseline) w/ elevated liver enzymes, trop 505 -> 506, [place] [address]. He was administered 2L IVF.      (25 Apr 2024 23:00)    Per chart review, pt s/p stent a week ago and recurrent exertional chest pain on isordil. Now chest pain free.     --EKG w/o acute changes  --c/w isordil  --f/u primary team/cards    [id number] MEDICINE NP-EPISODIC NOTE    Notified by RN patient c/o of chest pain with exertion now improved. Seen and examined patient at bedside. Patient is alert, NAD; sitting in chair. States has chest pain when he exerts self now feels much better. Denies HA, CP, SOB, cough, N/V, or abd pain.    VITAL SIGNS:  Vital Signs Last 24 Hrs  T(C): 36.4 (29 Apr 2024 05:37), Max: 37.1 (28 Apr 2024 12:28)  T(F): 97.5 (29 Apr 2024 05:37), Max: 98.7 (28 Apr 2024 12:28)  HR: 82 (29 Apr 2024 05:37) (77 - 98)  BP: 109/60 (29 Apr 2024 05:37) (102/65 - 128/57)  BP(mean): --  RR: 18 (29 Apr 2024 05:37) (18 - 18)  SpO2: 99% (29 Apr 2024 05:37) (96% - 99%)      LABORATORY:                          11.7   19.57 )-----------( 475      ( 29 Apr 2024 06:14 )             38.2       04-28    132<L>  |  102  |  75<H>  ----------------------------<  112<H>  4.6   |  17<L>  |  1.93<H>    Ca    8.6      28 Apr 2024 06:19  Phos  4.0     04-27  Mg     2.3     04-27    TPro  5.3<L>  /  Alb  2.8<L>  /  TBili  0.7  /  DBili  x   /  AST  1094<H>  /  ALT  693<H>  /  AlkPhos  110  04-28          MICROBIOLOGY:           RADIOLOGY:          PHYSICAL EXAM:    Constitutional: AOx3. NAD.    Respiratory: clear lungs bilaterally. No wheezing, rhonchi, or crackles.    Cardiovascular: S1 S2. No murmurs.    Gastrointestinal: BS X4 active. soft. nontender.    Extremities/Vascular: +2 pulses bilaterally. No BLE edema.      ASSESSMENT/PLAN:     Pt is a 68yo M w/ PMH of HTN, HLD, DM2, CAD s/p 10 stents (last 1 week ago), Afib on Eliquis, HFrEF, CKD III pw LE pain/cramping.    Pt recently admitted 04/16-04/19 for NSTEMI s/p LHC w/ stent placement. He now reports b/l LE pain, stiffness and cramping w/ difficulty ambulating or even crossing legs for the past 7-8 days since discharge from the hospital. He also now reports dark urine.     He also reports some mild pressure like CP since discharge for which he followed up w/ his cardiologist outpt and noted to be likely i/s/o recent cath and to c/w Isosorbide.     Today he went to PMD and i/s/o weakness in LE and tachycardia, he was advised to present to the ED.    In the ED VSS w/ labs notable for leukocytosis to 15 and Cr 1.92 (~baseline) w/ elevated liver enzymes, trop 505 -> 506, [place] [address]. He was administered 2L IVF.      (25 Apr 2024 23:00)    Per chart review, pt s/p stent a week ago and recurrent exertional chest pain on isordil. Now chest pain free.     --EKG w/o acute changes  --c/w isordil  --f/u primary team/cards    [id number]

## 2024-04-29 NOTE — PROGRESS NOTE ADULT - SUBJECTIVE AND OBJECTIVE BOX
incomplete KRISTY COYLE  [id number]    INTERVAL HPI/OVERNIGHT EVENTS: Pt says he feels the same since starting steroids yesterday. No improvement in weakness. Describes some episodes of trouble swallowing with solids only, no problems w/ liquids/soft food. Denies choking/cough, chest pain, sob, rash.    MEDICATIONS  (STANDING):  apixaban 2.5 milliGRAM(s) Oral every 12 hours  chlorhexidine 2% Cloths 1 Application(s) Topical daily  clopidogrel Tablet 75 milliGRAM(s) Oral daily  dextrose 10% Bolus 125 milliLiter(s) IV Bolus once  dextrose 5%. 1000 milliLiter(s) (50 mL/Hr) IV Continuous <Continuous>  dextrose 5%. 1000 milliLiter(s) (100 mL/Hr) IV Continuous <Continuous>  dextrose 50% Injectable 12.5 Gram(s) IV Push once  dextrose 50% Injectable 25 Gram(s) IV Push once  furosemide   Injectable 40 milliGRAM(s) IV Push daily  glucagon  Injectable 1 milliGRAM(s) IntraMuscular once  influenza  Vaccine (HIGH DOSE) 0.7 milliLiter(s) IntraMuscular once  insulin glargine Injectable (LANTUS) 26 Unit(s) SubCutaneous at bedtime  insulin lispro (ADMELOG) corrective regimen sliding scale   SubCutaneous three times a day before meals  insulin lispro (ADMELOG) corrective regimen sliding scale   SubCutaneous at bedtime  insulin lispro Injectable (ADMELOG) 10 Unit(s) SubCutaneous three times a day before meals  isosorbide   dinitrate Tablet (ISORDIL) 30 milliGRAM(s) Oral three times a day  methylPREDNISolone sodium succinate Injectable 20 milliGRAM(s) IV Push two times a day  metoprolol tartrate 25 milliGRAM(s) Oral two times a day  polyethylene glycol 3350 17 Gram(s) Oral daily  senna 2 Tablet(s) Oral at bedtime  sodium bicarbonate  Infusion 0.127 mEq/kG/Hr (100 mL/Hr) IV Continuous <Continuous>    MEDICATIONS  (PRN):  acetaminophen     Tablet .. 650 milliGRAM(s) Oral every 6 hours PRN Temp greater or equal to 38C (100.4F), Mild Pain (1 - 3)  dextrose Oral Gel 15 Gram(s) Oral once PRN Blood Glucose LESS THAN 70 milliGRAM(s)/deciliter  nitroglycerin     SubLingual 0.4 milliGRAM(s) SubLingual every 5 minutes PRN Chest Pain      Allergies    atorvastatin (Muscle Pain (Severe))    Intolerances        Review of Systems: as above    Vital Signs Last 24 Hrs  T(C): 36.4 (29 Apr 2024 05:37), Max: 36.8 (28 Apr 2024 20:44)  T(F): 97.5 (29 Apr 2024 05:37), Max: 98.2 (28 Apr 2024 20:44)  HR: 93 (29 Apr 2024 10:52) (77 - 98)  BP: 152/83 (29 Apr 2024 10:52) (109/60 - 152/83)  BP(mean): --  RR: 18 (29 Apr 2024 10:52) (18 - 18)  SpO2: 97% (29 Apr 2024 10:52) (97% - 99%)    Parameters below as of 29 Apr 2024 10:52  Patient On (Oxygen Delivery Method): room air    Physical Exam:  General: Breathing comfortably on room air, no distress  HEENT: EOMI, MMM, no oral ulcers  CVS: +S1/S2, RRR  Resp: CTA b/l. No crackles/wheezing  GI: Soft, NT/ND +BS  MSK: no synovitis. 4/5 strength in b/l shoulders, 5/5 distals arms/hands, 2/5 b/l thighs, 5/5 legs/feet  Skin: no visible rashes    LABS:                        11.7   19.57 )-----------( 475      ( 29 Apr 2024 06:14 )             38.2     04-29    131<L>  |  104  |  79<H>  ----------------------------<  306<H>  5.3   |  13<L>  |  1.78<H>    Ca    9.1      29 Apr 2024 06:14  Mg     2.5     04-29    TPro  5.6<L>  /  Alb  2.6<L>  /  TBili  0.7  /  DBili  x   /  AST  909<H>  /  ALT  752<H>  /  AlkPhos  128<H>  04-29      Urinalysis Basic - ( 29 Apr 2024 06:14 )    Color: x / Appearance: x / SG: x / pH: x  Gluc: 306 mg/dL / Ketone: x  / Bili: x / Urobili: x   Blood: x / Protein: x / Nitrite: x   Leuk Esterase: x / RBC: x / WBC x   Sq Epi: x / Non Sq Epi: x / Bacteria: x    < from: MR Pelvis No Cont (04.29.24 @ 10:16) >  ACC: [id number] EXAM:  MR PELVIS   ORDERED BY: PRABHAKAR BISHOP     PROCEDURE DATE:  04/29/2024          INTERPRETATION:  MRI OF THE BONY PELVIS    CLINICAL INFORMATION: For evaluation of myositis.    COMPARISON: None available.    TECHNIQUE: Multiplanar, multisequence imaging of the bony pelvis and   bilateral thighs.    FINDINGS:    OSSEOUS: Focal marrow edema like signal surrounds a small nondisplaced   subchondral insufficiency fracture along left peripheral weightbearing   lateral femoral condyle. No osteonecrosis or aggressive osseous   neoplasm., osteonecrosis, or aggressive neoplasm. Moderate left worse   than right hip arthrosis without effusions. Sacroiliac joints and pubic   symphysis are maintained    GENERAL: Heterogeneous but grossly symmetric intramuscular edema like   signal of bilateral hip girdle and thigh musculature, most prominent   within the left anterior gluteus medius medius near its proximal iliac   origin as well as at the distal myotendinous junction of the tensor   fascial lary near its junction with the iliotibial band and relatively   sparing the bilateral quadriceps compartments. Distal radial   peritendinitis with trace reactive fluid within bilateral subacromial and   greater trochanteric bursae, but without discrete tear or tracy bursitis.    Small to moderate left larger than right patellar knee joint effusions.   No discrete mass lesion, hematoma, distended Baker's popliteal cyst, or   drainable encapsulated fluid collection. Common hamstring origins and   iliopsoas insertions are intact. No pelvic free fluid.    IMPRESSION:    1.  Nonspecific, heterogeneous but grossly symmetric patchy myositis of   bilateral hip girdle and thigh musculature. Consider muscle biopsy of   left proximal anterior gluteus medius or ipsilateral distal tensor   fascial lary.  2.  Small subacute nondisplaced subchondral insufficiency fracture of   left lateral femoral condyle.    --- End of Report ---            BETSY PETERSON MD; Attending Radiologist  This document has been electronically signed. Apr 29 2024 11:02AM    < end of copied text >    < from: CT Chest No Cont (04.29.24 @ 09:44) >    ACC: [id number] EXAM:  CT ABDOMEN AND PELVIS   ORDERED BY: PRABHAKAR BISHOP     ACC: [id number] EXAM:  CT CHEST   ORDERED BY: PRABHAKAR BISHOP     PROCEDURE DATE:  04/29/2024          INTERPRETATION:  CLINICAL INFORMATION: Elevated CK. Concern for   inflammatory myositis. Evaluate for malignancy.    COMPARISON: CT chest, abdomen, and pelvis 5/27/2023.    CONTRAST/COMPLICATIONS:  IV Contrast: NONE  Oral Contrast: NONE  Complications: None reported at time of study completion    PROCEDURE:  CT of the Chest, Abdomen and Pelvis was performed.  Sagittal and coronal reformats were performed.    FINDINGS:  Limited evaluation of the vasculature and viscera in the absence of   intravenous contrast.    CHEST:  LUNGS AND LARGE AIRWAYS: Patent central airways.Mild bilateral   subsegmental atelectasis. A 2 mm nodule in the lingula is unchanged   (series 4 image 283).  PLEURA: No pleural effusion.  VESSELS: Atherosclerotic changes. Coronary artery calcifications and   possibly stenting.  HEART: Heart size is normal. No pericardial effusion.  MEDIASTINUM AND JESUS: Small nonspecific mediastinal lymph nodes are   overall similar to the previous exam. For example, a right paratracheal   node measures 1.6 x 1.1 cm (series 3 image 52).  CHEST WALL AND LOWER NECK: Mild generalized subcutaneous edema.   Intramuscular lipoma in the the right latissimus dorsi measuring 1.3 x   0.8 cm.    ABDOMEN AND PELVIS:  LIVER: Within normal limits.  BILE DUCTS: Normal caliber.  GALLBLADDER: Within normal limits.  SPLEEN:Within normal limits.  PANCREAS: Within normal limits.  ADRENALS: Within normal limits.  KIDNEYS/URETERS: Within normal limits.    BLADDER: Within normal limits.  REPRODUCTIVE ORGANS: Mildly enlarged prostate.    BOWEL: No bowel obstruction.  PERITONEUM: Trace ascites. Mesenteric edema.  VESSELS: Atherosclerotic changes.  RETROPERITONEUM/LYMPH NODES: Small nonspecific lymph nodes in the central   mesentery are similar to prior. For example, a mesenteric node in the   left hemiabdomen measures 1.3x 0.9 cm (series 3 image 153). Presacral   edema.  ABDOMINAL WALL: Generalized subcutaneous edema. Small bilateral   fat-containing inguinal hernias.  BONES: Degenerative changes.    IMPRESSION:  *  The exam may be limited by noncontrast technique.  *  No evidence of malignancy within the chest, abdomen, or pelvis.  *  Generalized subcutaneous edema.  *  Trace ascites and mesenteric edema.        --- End of Report ---            AVELINO AJ MD; Attending Radiologist  This document has been electronically signed. Apr 29 2024 11:00AM    < end of copied text >    Creatine Kinase, Serum in AM (04.29.24 @ 06:14)   Creatine Kinase, Serum: 11024 U/L      Historical Values  Creatine Kinase, Serum in AM (04.29.24 @ 06:14)   Creatine Kinase, Serum: 30423 U/L  Creatine Kinase, Serum in AM (04.28.24 @ 06:19)   Creatine Kinase, Serum: 94208 U/L  Creatine Kinase, Serum in AM (04.27.24 @ 07:11)   Creatine Kinase, Serum: 23928 U/L  Creatine Kinase, Serum in AM (04.26.24 @ 06:25)   Creatine Kinase, Serum: 00530 U/L  Creatine Kinase, Serum in AM (09.07.19 @ 07:03)   Creatine Kinase, Serum: 164 U/L       KRISTY COYLE  [id number]    INTERVAL HPI/OVERNIGHT EVENTS: Pt says he feels the same since starting steroids yesterday. No improvement in weakness. Describes some episodes of trouble swallowing with solids only, no problems w/ liquids/soft food. Denies choking/cough, chest pain, sob, rash.    MEDICATIONS  (STANDING):  apixaban 2.5 milliGRAM(s) Oral every 12 hours  chlorhexidine 2% Cloths 1 Application(s) Topical daily  clopidogrel Tablet 75 milliGRAM(s) Oral daily  dextrose 10% Bolus 125 milliLiter(s) IV Bolus once  dextrose 5%. 1000 milliLiter(s) (50 mL/Hr) IV Continuous <Continuous>  dextrose 5%. 1000 milliLiter(s) (100 mL/Hr) IV Continuous <Continuous>  dextrose 50% Injectable 12.5 Gram(s) IV Push once  dextrose 50% Injectable 25 Gram(s) IV Push once  furosemide   Injectable 40 milliGRAM(s) IV Push daily  glucagon  Injectable 1 milliGRAM(s) IntraMuscular once  influenza  Vaccine (HIGH DOSE) 0.7 milliLiter(s) IntraMuscular once  insulin glargine Injectable (LANTUS) 26 Unit(s) SubCutaneous at bedtime  insulin lispro (ADMELOG) corrective regimen sliding scale   SubCutaneous three times a day before meals  insulin lispro (ADMELOG) corrective regimen sliding scale   SubCutaneous at bedtime  insulin lispro Injectable (ADMELOG) 10 Unit(s) SubCutaneous three times a day before meals  isosorbide   dinitrate Tablet (ISORDIL) 30 milliGRAM(s) Oral three times a day  methylPREDNISolone sodium succinate Injectable 20 milliGRAM(s) IV Push two times a day  metoprolol tartrate 25 milliGRAM(s) Oral two times a day  polyethylene glycol 3350 17 Gram(s) Oral daily  senna 2 Tablet(s) Oral at bedtime  sodium bicarbonate  Infusion 0.127 mEq/kG/Hr (100 mL/Hr) IV Continuous <Continuous>    MEDICATIONS  (PRN):  acetaminophen     Tablet .. 650 milliGRAM(s) Oral every 6 hours PRN Temp greater or equal to 38C (100.4F), Mild Pain (1 - 3)  dextrose Oral Gel 15 Gram(s) Oral once PRN Blood Glucose LESS THAN 70 milliGRAM(s)/deciliter  nitroglycerin     SubLingual 0.4 milliGRAM(s) SubLingual every 5 minutes PRN Chest Pain      Allergies    atorvastatin (Muscle Pain (Severe))    Intolerances        Review of Systems: as above    Vital Signs Last 24 Hrs  T(C): 36.4 (29 Apr 2024 05:37), Max: 36.8 (28 Apr 2024 20:44)  T(F): 97.5 (29 Apr 2024 05:37), Max: 98.2 (28 Apr 2024 20:44)  HR: 93 (29 Apr 2024 10:52) (77 - 98)  BP: 152/83 (29 Apr 2024 10:52) (109/60 - 152/83)  BP(mean): --  RR: 18 (29 Apr 2024 10:52) (18 - 18)  SpO2: 97% (29 Apr 2024 10:52) (97% - 99%)    Parameters below as of 29 Apr 2024 10:52  Patient On (Oxygen Delivery Method): room air    Physical Exam:  General: Breathing comfortably on room air, no distress  HEENT: EOMI, MMM, no oral ulcers  CVS: +S1/S2, RRR  Resp: CTA b/l. No crackles/wheezing  GI: Soft, NT/ND +BS  MSK: no synovitis. 4/5 strength in b/l shoulders, 5/5 distals arms/hands, 2/5 b/l thighs, 5/5 legs/feet  Skin: no visible rashes    LABS:                        11.7   19.57 )-----------( 475      ( 29 Apr 2024 06:14 )             38.2     04-29    131<L>  |  104  |  79<H>  ----------------------------<  306<H>  5.3   |  13<L>  |  1.78<H>    Ca    9.1      29 Apr 2024 06:14  Mg     2.5     04-29    TPro  5.6<L>  /  Alb  2.6<L>  /  TBili  0.7  /  DBili  x   /  AST  909<H>  /  ALT  752<H>  /  AlkPhos  128<H>  04-29      Urinalysis Basic - ( 29 Apr 2024 06:14 )    Color: x / Appearance: x / SG: x / pH: x  Gluc: 306 mg/dL / Ketone: x  / Bili: x / Urobili: x   Blood: x / Protein: x / Nitrite: x   Leuk Esterase: x / RBC: x / WBC x   Sq Epi: x / Non Sq Epi: x / Bacteria: x    < from: MR Pelvis No Cont (04.29.24 @ 10:16) >  ACC: [id number] EXAM:  MR PELVIS   ORDERED BY: PRABHAKAR BISHOP     PROCEDURE DATE:  04/29/2024          INTERPRETATION:  MRI OF THE BONY PELVIS    CLINICAL INFORMATION: For evaluation of myositis.    COMPARISON: None available.    TECHNIQUE: Multiplanar, multisequence imaging of the bony pelvis and   bilateral thighs.    FINDINGS:    OSSEOUS: Focal marrow edema like signal surrounds a small nondisplaced   subchondral insufficiency fracture along left peripheral weightbearing   lateral femoral condyle. No osteonecrosis or aggressive osseous   neoplasm., osteonecrosis, or aggressive neoplasm. Moderate left worse   than right hip arthrosis without effusions. Sacroiliac joints and pubic   symphysis are maintained    GENERAL: Heterogeneous but grossly symmetric intramuscular edema like   signal of bilateral hip girdle and thigh musculature, most prominent   within the left anterior gluteus medius medius near its proximal iliac   origin as well as at the distal myotendinous junction of the tensor   fascial lray near its junction with the iliotibial band and relatively   sparing the bilateral quadriceps compartments. Distal radial   peritendinitis with trace reactive fluid within bilateral subacromial and   greater trochanteric bursae, but without discrete tear or tracy bursitis.    Small to moderate left larger than right patellar knee joint effusions.   No discrete mass lesion, hematoma, distended Baker's popliteal cyst, or   drainable encapsulated fluid collection. Common hamstring origins and   iliopsoas insertions are intact. No pelvic free fluid.    IMPRESSION:    1.  Nonspecific, heterogeneous but grossly symmetric patchy myositis of   bilateral hip girdle and thigh musculature. Consider muscle biopsy of   left proximal anterior gluteus medius or ipsilateral distal tensor   fascial lary.  2.  Small subacute nondisplaced subchondral insufficiency fracture of   left lateral femoral condyle.    --- End of Report ---            BETSY PETERSON MD; Attending Radiologist  This document has been electronically signed. Apr 29 2024 11:02AM    < end of copied text >    < from: CT Chest No Cont (04.29.24 @ 09:44) >    ACC: [id number] EXAM:  CT ABDOMEN AND PELVIS   ORDERED BY: PRABHAKAR BISHOP     ACC: [id number] EXAM:  CT CHEST   ORDERED BY: PRABHAKAR BISHOP     PROCEDURE DATE:  04/29/2024          INTERPRETATION:  CLINICAL INFORMATION: Elevated CK. Concern for   inflammatory myositis. Evaluate for malignancy.    COMPARISON: CT chest, abdomen, and pelvis 5/27/2023.    CONTRAST/COMPLICATIONS:  IV Contrast: NONE  Oral Contrast: NONE  Complications: None reported at time of study completion    PROCEDURE:  CT of the Chest, Abdomen and Pelvis was performed.  Sagittal and coronal reformats were performed.    FINDINGS:  Limited evaluation of the vasculature and viscera in the absence of   intravenous contrast.    CHEST:  LUNGS AND LARGE AIRWAYS: Patent central airways.Mild bilateral   subsegmental atelectasis. A 2 mm nodule in the lingula is unchanged   (series 4 image 283).  PLEURA: No pleural effusion.  VESSELS: Atherosclerotic changes. Coronary artery calcifications and   possibly stenting.  HEART: Heart size is normal. No pericardial effusion.  MEDIASTINUM AND JESUS: Small nonspecific mediastinal lymph nodes are   overall similar to the previous exam. For example, a right paratracheal   node measures 1.6 x 1.1 cm (series 3 image 52).  CHEST WALL AND LOWER NECK: Mild generalized subcutaneous edema.   Intramuscular lipoma in the the right latissimus dorsi measuring 1.3 x   0.8 cm.    ABDOMEN AND PELVIS:  LIVER: Within normal limits.  BILE DUCTS: Normal caliber.  GALLBLADDER: Within normal limits.  SPLEEN:Within normal limits.  PANCREAS: Within normal limits.  ADRENALS: Within normal limits.  KIDNEYS/URETERS: Within normal limits.    BLADDER: Within normal limits.  REPRODUCTIVE ORGANS: Mildly enlarged prostate.    BOWEL: No bowel obstruction.  PERITONEUM: Trace ascites. Mesenteric edema.  VESSELS: Atherosclerotic changes.  RETROPERITONEUM/LYMPH NODES: Small nonspecific lymph nodes in the central   mesentery are similar to prior. For example, a mesenteric node in the   left hemiabdomen measures 1.3x 0.9 cm (series 3 image 153). Presacral   edema.  ABDOMINAL WALL: Generalized subcutaneous edema. Small bilateral   fat-containing inguinal hernias.  BONES: Degenerative changes.    IMPRESSION:  *  The exam may be limited by noncontrast technique.  *  No evidence of malignancy within the chest, abdomen, or pelvis.  *  Generalized subcutaneous edema.  *  Trace ascites and mesenteric edema.        --- End of Report ---            AVELINO AJ MD; Attending Radiologist  This document has been electronically signed. Apr 29 2024 11:00AM    < end of copied text >    Creatine Kinase, Serum in AM (04.29.24 @ 06:14)   Creatine Kinase, Serum: 62252 U/L      Historical Values  Creatine Kinase, Serum in AM (04.29.24 @ 06:14)   Creatine Kinase, Serum: 57671 U/L  Creatine Kinase, Serum in AM (04.28.24 @ 06:19)   Creatine Kinase, Serum: 40230 U/L  Creatine Kinase, Serum in AM (04.27.24 @ 07:11)   Creatine Kinase, Serum: 83107 U/L  Creatine Kinase, Serum in AM (04.26.24 @ 06:25)   Creatine Kinase, Serum: 62826 U/L  Creatine Kinase, Serum in AM (09.07.19 @ 07:03)   Creatine Kinase, Serum: 164 U/L

## 2024-04-29 NOTE — PROVIDER CONTACT NOTE (CRITICAL VALUE NOTIFICATION) - ASSESSMENT
Pt AAOx4. VSS on RA; denies CP, SOB, no acute events noted on tele
vss, no chest pain
Pt AAOx4. VSS on RA; denies CP, SOB, no acute events noted on tele

## 2024-04-29 NOTE — PROGRESS NOTE ADULT - SUBJECTIVE AND OBJECTIVE BOX
[facility]  Division of Infectious Diseases  [phone]    Name: KRISTY COYLE  Age: 69y  Gender: Male  MRN: [id number]    Interval History--  Notes reviewed. Feeling ok. Weakness no change. No new complaints.  Rheum input appreicated.     Past Medical History--  Carotid Stenosis    Diabetes Mellitus    Neuropathy    Former smoker    CAD in native artery    Type 2 diabetes mellitus    Hyperlipidemia, unspecified hyperlipidemia type    Essential hypertension, hypertension with unspecified goal    Afib    Hematoma of leg    Stented coronary artery    CHF (congestive heart failure)    No Past Surgical History    s/p Carotid Endarterectomy        For details regarding the patient's social history, family history, and other miscellaneous elements, please refer the initial infectious diseases consultation and/or the admitting history and physical examination for this admission.    Allergies    atorvastatin (Muscle Pain (Severe))    Intolerances        Medications--  Antibiotics:    Immunologic:  influenza  Vaccine (HIGH DOSE) 0.7 milliLiter(s) IntraMuscular once    Other:  acetaminophen     Tablet .. PRN  apixaban  aspirin enteric coated  chlorhexidine 2% Cloths  clopidogrel Tablet  dextrose 10% Bolus  dextrose 5%.  dextrose 5%.  dextrose 50% Injectable  dextrose 50% Injectable  dextrose Oral Gel PRN  furosemide   Injectable  glucagon  Injectable  insulin glargine Injectable (LANTUS)  insulin lispro (ADMELOG) corrective regimen sliding scale  insulin lispro (ADMELOG) corrective regimen sliding scale  insulin lispro Injectable (ADMELOG)  isosorbide   dinitrate Tablet (ISORDIL)  methylPREDNISolone sodium succinate Injectable  metoprolol tartrate  polyethylene glycol 3350  senna  sodium bicarbonate  Infusion      Review of Systems--  A 10-point review of systems was obtained.   Review of systems otherwise negative except as previously noted.    Physical Examination--  Vital Signs: T(F): 97.5 (04-29-24 @ 05:37), Max: 98.7 (04-28-24 @ 12:28)  HR: 93 (04-29-24 @ 10:52)  BP: 152/83 (04-29-24 @ 10:52)  RR: 18 (04-29-24 @ 10:52)  SpO2: 97% (04-29-24 @ 10:52)  Wt(kg): --  General: Nontoxic-appearing Male in no acute distress.  HEENT: AT/NC. Anicteric. Conjunctiva pink and moist. Oropharynx clear.   Neck: Not rigid. No sense of mass.  Nodes: None palpable.  Lungs: Clear bilaterally  Heart: Irreg irreg  Abdomen: Bowel sounds present and normoactive. Soft. Nondistended. Nontender.   Extremities: No cyanosis or clubbing. No edema.   Skin: Warm. Dry. Good turgor. No rash. No vasculitic stigmata.  Psychiatric: Appropriate affect and mood for situation.         Laboratory Studies--  CBC                        11.7   19.57 )-----------( 475      ( 29 Apr 2024 06:14 )             38.2     WBC Count: 16.71 K/uL (04-28-24 @ 06:22)  WBC Count: 16.17 K/uL (04-27-24 @ 07:11)  WBC Count: 13.09 K/uL (04-26-24 @ 06:25)  WBC Count: 15.09 K/uL (04-25-24 @ 19:07)    Chemistries  04-29    131<L>  |  104  |  79<H>  ----------------------------<  306<H>  5.3   |  13<L>  |  1.78<H>    Ca    9.1      29 Apr 2024 06:14  Mg     2.5     04-29    TPro  5.6<L>  /  Alb  2.6<L>  /  TBili  0.7  /  DBili  x   /  AST  909<H>  /  ALT  752<H>  /  AlkPhos  128<H>  04-29      Culture Data    Culture - Blood (collected 27 Apr 2024 21:50)  Source: .Blood Blood-Peripheral  Preliminary Report (29 Apr 2024 03:02):    No growth at 24 hours    Culture - Blood (collected 27 Apr 2024 19:00)  Source: .Blood Blood-Peripheral  Preliminary Report (29 Apr 2024 07:02):    No growth at 24 hours

## 2024-04-29 NOTE — CONSULT NOTE ADULT - SUBJECTIVE AND OBJECTIVE BOX
General Surgery Consult      HPI: Jean Claude Velázquez is a 69 y.o. man with history of HTN, HLD, DM2, CAD c/b NSTEMI (4/16) s/p 10 stents (last 1 week prior to admission, Plavix), Afib on Eliquis, CHF, and CKD who is currently admitted for evaluation of rhabdomyolysis. Surgery consultation placed for muscle biopsy.     PAST MEDICAL HISTORY:  Carotid Stenosis  Diabetes Mellitus  Neuropathy  CAD  Hyperlipidemia  HTN  Afib  CHF (congestive heart failure)      PAST SURGICAL HISTORY:  s/p Carotid Endarterectomy  Coronary Artery Stenting      MEDICATIONS:  atorvastatin 80 mg oral tablet: 1 tab(s) orally once a day (at bedtime) (26 Apr 2024 01:47)  clopidogrel 75 mg oral tablet: 1 tab(s) orally once a day (26 Apr 2024 01:47)  ezetimibe 10 mg oral tablet: 1 tab(s) orally once a day (26 Apr 2024 01:47)  Farxiga 10 mg oral tablet: 1 tab(s) orally once a day (26 Apr 2024 01:47)  furosemide 40 mg oral tablet: 1 tab(s) orally once a day (26 Apr 2024 01:47)  multivitamin gummies: 1 gummy orally once a day (26 Apr 2024 01:47)  Semglee (Prefilled Pen) 100 units/mL subcutaneous solution: 28-30 units once a day (26 Apr 2024 01:47)  valsartan 40 mg oral tablet: 1 tab(s) orally once a day (26 Apr 2024 01:47)      ALLERGIES:  atorvastatin (Muscle Pain (Severe))      VITALS & I/Os:  Vital Signs Last 24 Hrs  T(C): 36.4 (29 Apr 2024 05:37), Max: 36.8 (28 Apr 2024 20:44)  T(F): 97.5 (29 Apr 2024 05:37), Max: 98.2 (28 Apr 2024 20:44)  HR: 93 (29 Apr 2024 10:52) (77 - 98)  BP: 152/83 (29 Apr 2024 10:52) (109/60 - 152/83)  BP(mean): --  RR: 18 (29 Apr 2024 10:52) (18 - 18)  SpO2: 97% (29 Apr 2024 10:52) (97% - 99%)    Parameters below as of 29 Apr 2024 10:52  Patient On (Oxygen Delivery Method): room air      I&O's Summary  28 Apr 2024 07:01  -  29 Apr 2024 07:00  --------------------------------------------------------  IN: 1780 mL / OUT: 2500 mL / NET: -720 mL    29 Apr 2024 07:01  -  29 Apr 2024 15:14  --------------------------------------------------------  IN: 480 mL / OUT: 1450 mL / NET: -970 mL      PHYSICAL EXAM:  General: No acute distress  Respiratory: Nonlabored  Cardiovascular: normotensive, regular rate  Abdominal: Soft, nondistended, nontender. No rebound or guarding. No organomegaly, no palpable mass.  Extremities: Warm      LABS:                        11.7   19.57 )-----------( 475      ( 29 Apr 2024 06:14 )             38.2     04-29    131<L>  |  104  |  79<H>  ----------------------------<  306<H>  5.3   |  13<L>  |  1.78<H>    Ca    9.1      29 Apr 2024 06:14  Mg     2.5     04-29    TPro  5.6<L>  /  Alb  2.6<L>  /  TBili  0.7  /  DBili  x   /  AST  909<H>  /  ALT  752<H>  /  AlkPhos  128<H>  04-29    CARDIAC MARKERS ( 29 Apr 2024 06:14 )  x     / x     / 21370 U/L / x     / x      CARDIAC MARKERS ( 28 Apr 2024 06:19 )  x     / x     / 60355 U/L / x     / x        Urinalysis Basic - ( 29 Apr 2024 06:14 )    Color: x / Appearance: x / SG: x / pH: x  Gluc: 306 mg/dL / Ketone: x  / Bili: x / Urobili: x   Blood: x / Protein: x / Nitrite: x   Leuk Esterase: x / RBC: x / WBC x   Sq Epi: x / Non Sq Epi: x / Bacteria: x

## 2024-04-29 NOTE — PROGRESS NOTE ADULT - PROBLEM SELECTOR PLAN 1
Will increase Lantus to 22 u at bedtime.  Will increase Admelog to 10 u before each meal and continue Admelog correction scale coverage. Will continue monitoring FS and Follow up.  DC on home Semglee   Stop home Farxiga for now due to recent BARRY/rhabdo  OP follow up

## 2024-04-29 NOTE — PROVIDER CONTACT NOTE (OTHER) - ACTION/TREATMENT ORDERED:
AM dose of Isosorbide given  EKG complete & sent to [facility]- exertional chest pain not new, recent stent a week ago.  [facility]ania came to assess patient at bedside; 0/10 chest pain at this time

## 2024-04-29 NOTE — PROGRESS NOTE ADULT - SUBJECTIVE AND OBJECTIVE BOX
Overnight events noted      VITAL:  T(C): , Max: 37.1 (04-28-24 @ 12:28)  T(F): , Max: 98.7 (04-28-24 @ 12:28)  HR: 82 (04-29-24 @ 05:37)  BP: 109/60 (04-29-24 @ 05:37)  BP(mean): --  RR: 18 (04-29-24 @ 05:37)  SpO2: 99% (04-29-24 @ 05:37)  Wt(kg): --      PHYSICAL EXAM:  Constitutional: NAD, Alert, fatigued  HEENT: NCAT, MMM  Neck: Supple, No JVD  Respiratory: CTA-b/l  Cardiovascular: RRR s1s2, no m/r/g  Gastrointestinal: BS+, soft, NT/ND  Extremities: No peripheral edema b/l  Neurological: no focal deficits; strength grossly intact  Back: no CVAT b/l  Skin: No rashes, no nevi      LABS:                        11.7   19.57 )-----------( 475      ( 29 Apr 2024 06:14 )             38.2     Na(131)/K(5.3)/Cl(104)/HCO3(13)/BUN(79)/Cr(1.78)Glu(306)/Ca(9.1)/Mg(2.5)/PO4(--)    04-29 @ 06:14  Na(132)/K(4.6)/Cl(102)/HCO3(17)/BUN(75)/Cr(1.93)Glu(112)/Ca(8.6)/Mg(--)/PO4(--)    04-28 @ 06:19  Na(132)/K(4.4)/Cl(100)/HCO3(18)/BUN(68)/Cr(1.89)Glu(156)/Ca(8.6)/Mg(2.3)/PO4(4.0)    04-27 @ 07:11    CPK 19,440<==28,933<==27,393<==22,835<==28,253 (4/25/24)        IMPRESSION: 69M w/ HTN, DM2, CAD, AFib, HFrEF, and CKD, s/p recent PCI, 4/25/24 returned with acute rhabdomyolysis    (1)Renal - CKD3-4 - due to diabetes/hypertension - at/near baseline  (2)Rhabdo - starting to improve as of today, off medications for hyperlipidemia and on IVF - we can try reducing IVF a bit today  (3)Metabolic acidosis - warranted that we place some HCO3 in the IVF  (4)Hyperkalemia - borderline - should improve, with IVF + IV Lasix  (5)CV - at risk for fluid overload from IVF required here for treatment of rhabdo -continue  both IVF and IV diuretics here to drive down the CPK while minimizing risk of associated fluid overload      RECOMMEND:  (1)Change IVF from NS@125cc/h==>1/2NS+75meq/L NaHCO3@100cc/h  (2)Continue Lasix 40mg iv qd as ordered  (3)CPK/BMP/Mg daily            Markell Walton MD  [facility]  Office/on call physician: [phone]  Cell (7a-7p): [phone]       no pain, no sob      VITAL:  T(C): , Max: 37.1 (04-28-24 @ 12:28)  T(F): , Max: 98.7 (04-28-24 @ 12:28)  HR: 82 (04-29-24 @ 05:37)  BP: 109/60 (04-29-24 @ 05:37)  BP(mean): --  RR: 18 (04-29-24 @ 05:37)  SpO2: 99% (04-29-24 @ 05:37)  Wt(kg): --      PHYSICAL EXAM:  Constitutional: NAD, Alert, fatigued  HEENT: NCAT, MMM  Neck: Supple, No JVD  Respiratory: CTA-b/l  Cardiovascular: RRR s1s2, no m/r/g  Gastrointestinal: BS+, soft, NT/ND  Extremities: No peripheral edema b/l  Neurological: no focal deficits; strength grossly intact  Back: no CVAT b/l  Skin: No rashes, no nevi      LABS:                        11.7   19.57 )-----------( 475      ( 29 Apr 2024 06:14 )             38.2     Na(131)/K(5.3)/Cl(104)/HCO3(13)/BUN(79)/Cr(1.78)Glu(306)/Ca(9.1)/Mg(2.5)/PO4(--)    04-29 @ 06:14  Na(132)/K(4.6)/Cl(102)/HCO3(17)/BUN(75)/Cr(1.93)Glu(112)/Ca(8.6)/Mg(--)/PO4(--)    04-28 @ 06:19  Na(132)/K(4.4)/Cl(100)/HCO3(18)/BUN(68)/Cr(1.89)Glu(156)/Ca(8.6)/Mg(2.3)/PO4(4.0)    04-27 @ 07:11    CPK 19,440<==28,933<==27,393<==22,835<==28,253 (4/25/24)        IMPRESSION: 69M w/ HTN, DM2, CAD, AFib, HFrEF, and CKD, s/p recent PCI, 4/25/24 returned with acute rhabdomyolysis    (1)Renal - CKD3-4 - due to diabetes/hypertension - at/near baseline  (2)Rhabdo - starting to improve as of today, off medications for hyperlipidemia and on IVF - we can try reducing IVF a bit today  (3)Metabolic acidosis - warranted that we place some HCO3 in the IVF  (4)Hyperkalemia - borderline - should improve, with IVF + IV Lasix  (5)CV - at risk for fluid overload from IVF required here for treatment of rhabdo -continue  both IVF and IV diuretics here to drive down the CPK while minimizing risk of associated fluid overload      RECOMMEND:  (1)Change IVF from NS@125cc/h==>1/2NS+75meq/L NaHCO3@100cc/h  (2)Continue Lasix 40mg iv qd as ordered  (3)CPK/BMP/Mg daily            Markell Walton MD  [facility]  Office/on call physician: [phone]  Cell (7a-7p): [phone]

## 2024-04-29 NOTE — PHARMACOTHERAPY INTERVENTION NOTE - COMMENTS
70 y/o Male PMHx of HTN, HLD, Type 2 Diabetes, CAD s/p multiple PCIs (LM and LAD), HFmrEF (EF 47%), Afib/flutter (on Eliquis), CKD stage III presenting for rhado.    Patient is currently on triple therapy, Eliquis for Afib and DAPT for multiple stents, most recent s/p TITO x1 dLAD 4/17. During that admission, interventional cardiology recommended one week of triple therapy and then dc aspirin after 1 week. Recommend to dc the aspirin since now >1 week of triple therapy and continue with Eliquis and Plavix.     Lorraine Fisher, PharmD, [facility]  Clinical Pharmacy Specialist  Teams (preferred) or [phone]

## 2024-04-29 NOTE — PROVIDER CONTACT NOTE (CRITICAL VALUE NOTIFICATION) - ACTION/TREATMENT ORDERED:
PA aware, no orders at this time
Provider made aware. No new interventions at this time
Provider made aware. No new interventions at this time

## 2024-04-29 NOTE — PROGRESS NOTE ADULT - ASSESSMENT
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 MRI thighs showing nonspecific myositis involving b/l hip girdle/thighs. Treatment is to discontinue the offending statin and avoid future use. Pt will likely need further immunosuppression given significant weakness on exam. To confirm the diagnosis and rule out other causes of inflammatory myositis, will send additional serology and obtain muscle biopsy. In the meantime, will start immunosuppressive treatment to avoid further muscle damage.     Plan:  -c/w solumedrol 20mg q12 to treat suspected inflammatory myositis    -f/u QuantiFeron, hepatitis B/C, myomarker panel, HMGCoA reductase, immunoglobulins  -please consult surgery for muscle biopsy of thigh    -would obtain speech/swallow eval as pt describes dysphagia to solid food only    #per inflammatory myositis malignancy screening guidelines: Pt is intermediate risk for malignancy (reports normal C-scope 2024)  -CT neck, chest, abdomen, pelvis WNL  -PSA WNL  -Elevated ESR/CRP which likely due to suspected inflammatory myositis  -f/u SPEP/UPEP/immunofixation, esr/crp    Discussed with Attending Dr. Nahun Mary, DO  Rheumatology Fellow  Available on TEAMS    Reference: International Guideline for Idiopathic Inflammatory Myopathy-Associated Cancer Screening: an International Myositis Assessment and Clinical Studies Group (IMACS) initiative 70 yo M PMH HLD, CAD (s/p 10 stents, last 4/2024), Afib on Eliquis, HFrEF, CKD3 2/2 DM/HTN p/w LE pain/cramping found to have CK 28K     #significant proximal muscle weakness (thighs>shoulders) w/ CK 28K unresponsive to fluids (5-7L)  #low grade CK elevation 971 (1/29/24), 1318 (2/9/24) in past  #on home lipitor 80mg    Constellation of symptoms and findings above 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 MRI thighs showing nonspecific myositis involving b/l hip girdle/thighs. Treatment is to discontinue the offending statin and avoid future use. Pt will likely need further immunosuppression given significant weakness on exam. To confirm the diagnosis and rule out other causes of inflammatory myositis, will send additional serology and obtain muscle biopsy. In the meantime, will start immunosuppressive treatment to avoid further muscle damage.     Plan:  -c/w solumedrol 20mg q12 to treat suspected inflammatory myositis    -f/u QuantiFeron, hepatitis B/C, myomarker panel, HMGCoA reductase, immunoglobulins  -please consult surgery for muscle biopsy of thigh (can target most superficial/feasible site of thigh musculature)  -would obtain speech/swallow eval as pt describes dysphagia to solid food only    #per inflammatory myositis malignancy screening guidelines: Pt is intermediate risk for malignancy (reports normal C-scope 2024)  -CT neck, chest, abdomen, pelvis WNL  -PSA WNL  -Elevated ESR/CRP which likely due to suspected inflammatory myositis  -f/u SPEP/UPEP/immunofixation, esr/crp    Discussed with Attending Dr. Nahun Mary, DO  Rheumatology Fellow  Available on TEAMS    Reference: International Guideline for Idiopathic Inflammatory Myopathy-Associated Cancer Screening: an International Myositis Assessment and Clinical Studies Group (IMACS) initiative

## 2024-04-29 NOTE — PROGRESS NOTE ADULT - SUBJECTIVE AND OBJECTIVE BOX
date of service: 04-29-24 @ 09:31  [facility]  REVIEW OF SYSTEMS:  CONSTITUTIONAL: No fever,  no  weight loss  ENT:  No  tinnitus,   no   vertigo  NECK: No pain or stiffness  RESPIRATORY: No cough, wheezing, chills or hemoptysis;    No Shortness of Breath  CARDIOVASCULAR: No chest pain, palpitations, dizziness  GASTROINTESTINAL: No abdominal or epigastric pain. No nausea, vomiting, or hematemesis; No diarrhea  No melena or hematochezia.  GENITOURINARY: No dysuria, frequency, hematuria, or incontinence  NEUROLOGICAL: No headaches  SKIN: No itching,  no   rash  LYMPH Nodes: No enlarged glands  ENDOCRINE: No heat or cold intolerance  MUSCULOSKELETAL: No joint pain or swelling  PSYCHIATRIC: No depression, anxiety  HEME/LYMPH: No easy bruising, or bleeding gums  ALLERGY AND IMMUNOLOGIC: No hives or eczema	    MEDICATIONS  (STANDING):  apixaban 2.5 milliGRAM(s) Oral every 12 hours  aspirin enteric coated 81 milliGRAM(s) Oral daily  chlorhexidine 2% Cloths 1 Application(s) Topical daily  clopidogrel Tablet 75 milliGRAM(s) Oral daily  dextrose 10% Bolus 125 milliLiter(s) IV Bolus once  dextrose 5%. 1000 milliLiter(s) (100 mL/Hr) IV Continuous <Continuous>  dextrose 5%. 1000 milliLiter(s) (50 mL/Hr) IV Continuous <Continuous>  dextrose 50% Injectable 12.5 Gram(s) IV Push once  dextrose 50% Injectable 25 Gram(s) IV Push once  furosemide   Injectable 40 milliGRAM(s) IV Push daily  glucagon  Injectable 1 milliGRAM(s) IntraMuscular once  influenza  Vaccine (HIGH DOSE) 0.7 milliLiter(s) IntraMuscular once  insulin glargine Injectable (LANTUS) 26 Unit(s) SubCutaneous at bedtime  insulin lispro (ADMELOG) corrective regimen sliding scale   SubCutaneous three times a day before meals  insulin lispro (ADMELOG) corrective regimen sliding scale   SubCutaneous at bedtime  insulin lispro Injectable (ADMELOG) 10 Unit(s) SubCutaneous three times a day before meals  isosorbide   dinitrate Tablet (ISORDIL) 30 milliGRAM(s) Oral three times a day  methylPREDNISolone sodium succinate Injectable 20 milliGRAM(s) IV Push two times a day  metoprolol tartrate 25 milliGRAM(s) Oral two times a day  polyethylene glycol 3350 17 Gram(s) Oral daily  senna 2 Tablet(s) Oral at bedtime  sodium bicarbonate  Infusion 0.127 mEq/kG/Hr (100 mL/Hr) IV Continuous <Continuous>    MEDICATIONS  (PRN):  acetaminophen     Tablet .. 650 milliGRAM(s) Oral every 6 hours PRN Temp greater or equal to 38C (100.4F), Mild Pain (1 - 3)  dextrose Oral Gel 15 Gram(s) Oral once PRN Blood Glucose LESS THAN 70 milliGRAM(s)/deciliter      Vital Signs Last 24 Hrs  T(C): 36.4 (29 Apr 2024 05:37), Max: 37.1 (28 Apr 2024 12:28)  T(F): 97.5 (29 Apr 2024 05:37), Max: 98.7 (28 Apr 2024 12:28)  HR: 82 (29 Apr 2024 05:37) (77 - 98)  BP: 109/60 (29 Apr 2024 05:37) (102/65 - 128/57)  BP(mean): --  RR: 18 (29 Apr 2024 05:37) (18 - 18)  SpO2: 99% (29 Apr 2024 05:37) (96% - 99%)    Parameters below as of 29 Apr 2024 05:37  Patient On (Oxygen Delivery Method): room air      CAPILLARY BLOOD GLUCOSE      POCT Blood Glucose.: 271 mg/dL (29 Apr 2024 08:09)  POCT Blood Glucose.: 265 mg/dL (28 Apr 2024 21:46)  POCT Blood Glucose.: 199 mg/dL (28 Apr 2024 17:10)  POCT Blood Glucose.: 224 mg/dL (28 Apr 2024 12:27)    I&O's Summary    28 Apr 2024 07:01  -  29 Apr 2024 07:00  --------------------------------------------------------  IN: 1780 mL / OUT: 2500 mL / NET: -720 mL          Appearance: Normal	  HEENT:   Normal oral mucosa, PERRL, EOMI	  Lymphatic: No lymphadenopathy  Cardiovascular: Normal S1 S2, No JVD  Respiratory: Lungs clear to auscultation	  Gastrointestinal:  Soft, Non-tender, + BS	  Skin: No rash, No ecchymoses	  Extremities:     LABS:                        11.7   19.57 )-----------( 475      ( 29 Apr 2024 06:14 )             38.2     04-29    131<L>  |  104  |  79<H>  ----------------------------<  306<H>  5.3   |  13<L>  |  1.78<H>    Ca    9.1      29 Apr 2024 06:14  Mg     2.5     04-29    TPro  5.6<L>  /  Alb  2.6<L>  /  TBili  0.7  /  DBili  x   /  AST  909<H>  /  ALT  752<H>  /  AlkPhos  128<H>  04-29      CARDIAC MARKERS ( 29 Apr 2024 06:14 )  x     / x     / 57455 U/L / x     / x      CARDIAC MARKERS ( 28 Apr 2024 06:19 )  x     / x     / 44412 U/L / x     / x          Urinalysis Basic - ( 29 Apr 2024 06:14 )    Color: x / Appearance: x / SG: x / pH: x  Gluc: 306 mg/dL / Ketone: x  / Bili: x / Urobili: x   Blood: x / Protein: x / Nitrite: x   Leuk Esterase: x / RBC: x / WBC x   Sq Epi: x / Non Sq Epi: x / Bacteria: x              Thyroid Stimulating Hormone, Serum: 1.76 uIU/mL (02-10 @ 07:24)          Consultant(s) Notes Reviewed:      Care Discussed with Consultants/Other Providers:

## 2024-04-29 NOTE — CONSULT NOTE ADULT - ASSESSMENT
ASSESSMENT:  Jean Claude Velázquez is a 69 y.o. man with history of HTN, HLD, DM2, CAD c/b NSTEMI (4/16) s/p 10 stents (last 1 week prior to admission, Plavix), Afib on Eliquis, CHF, and CKD who is currently admitted for evaluation of rhabdomyolysis. Vascular surgery consultation to rule out PAD.     VA arterial duplex demonstrating right popliteal artery stenosis vs occlusion. Also left TP trunk stenosis. Patient sensory-motor intact with doppler signals b/l DP + PT.      PLAN:  - Continue Eliquis and Plavix  - No plan for further intervention in the setting of myositis  - Will reevaluate pending improvement  - Plan discussed with Dr. Jenaro Reyes      Vascular Surgery  [id number]   ASSESSMENT:  Jean Claude Velázquez is a 69 y.o. man with history of HTN, HLD, DM2, CAD c/b NSTEMI (4/16) s/p 10 stents (last 1 week prior to admission, Plavix), Afib on Eliquis, CHF, and CKD who is currently admitted for evaluation of rhabdomyolysis. Vascular surgery consultation to rule out PAD.     VA arterial duplex demonstrating right popliteal artery stenosis vs occlusion. Also left TP trunk stenosis. Patient sensory-motor intact with doppler signals b/l DP + PT.      PLAN:  - Continue Eliquis and Plavix  - No plan for further intervention in the setting of myositis  - Will reevaluate pending improvement, please reconsult vascular if any new questions or concerns  - Plan discussed with Dr. Jenaro Reyes      Vascular Surgery  [id number]

## 2024-04-29 NOTE — PROGRESS NOTE ADULT - ASSESSMENT
Rhabdomyolysis, at this point being attributed to statin.  Leukocytosis, likely reactive.   No infectious processes elucidated which would precipitate rhabdomyolysis. No fevers. No clear antecedent viral illness. No concerns for bacterial infection, had episode of chills but that was well before his cardiac cath/stent and has not recurred.  No travel. No localizing complaints on ROS not attributable to rhabdo in/of itself.     4/29: Blood cx NTD, no concern of active or uncontrolled infection on exam. I see no role for antibiotics here. No ID objection to steroids. WBC likely related to inflammatory process- steroids may make the WBC difficult to interpret in/of itself.     Suggestions--  Defer antibiotics  Recall as needed  I will sign off at this time.  Thank you for the courtesy of this referral.    Chicho Ho MD  Attending Physician  [facility]  Division of Infectious Diseases  [phone]

## 2024-04-30 LAB
ALBUMIN SERPL ELPH-MCNC: 2.7 G/DL — LOW (ref 3.3–5)
ALP SERPL-CCNC: 124 U/L — HIGH (ref 40–120)
ALT FLD-CCNC: 764 U/L — HIGH (ref 10–45)
ANION GAP SERPL CALC-SCNC: 12 MMOL/L — SIGNIFICANT CHANGE UP (ref 5–17)
AST SERPL-CCNC: 696 U/L — HIGH (ref 10–40)
BILIRUB SERPL-MCNC: 0.6 MG/DL — SIGNIFICANT CHANGE UP (ref 0.2–1.2)
BUN SERPL-MCNC: 73 MG/DL — HIGH (ref 7–23)
CALCIUM SERPL-MCNC: 8.3 MG/DL — LOW (ref 8.4–10.5)
CHLORIDE SERPL-SCNC: 102 MMOL/L — SIGNIFICANT CHANGE UP (ref 96–108)
CK SERPL-CCNC: CRITICAL HIGH U/L (ref 30–200)
CO2 SERPL-SCNC: 21 MMOL/L — LOW (ref 22–31)
CREAT SERPL-MCNC: 1.56 MG/DL — HIGH (ref 0.5–1.3)
EGFR: 48 ML/MIN/1.73M2 — LOW
GLUCOSE BLDC GLUCOMTR-MCNC: 192 MG/DL — HIGH (ref 70–99)
GLUCOSE BLDC GLUCOMTR-MCNC: 267 MG/DL — HIGH (ref 70–99)
GLUCOSE BLDC GLUCOMTR-MCNC: 296 MG/DL — HIGH (ref 70–99)
GLUCOSE BLDC GLUCOMTR-MCNC: 314 MG/DL — HIGH (ref 70–99)
GLUCOSE BLDC GLUCOMTR-MCNC: 324 MG/DL — HIGH (ref 70–99)
GLUCOSE SERPL-MCNC: 273 MG/DL — HIGH (ref 70–99)
HCT VFR BLD CALC: 32.2 % — LOW (ref 39–50)
HGB BLD-MCNC: 10.3 G/DL — LOW (ref 13–17)
MAGNESIUM SERPL-MCNC: 2.2 MG/DL — SIGNIFICANT CHANGE UP (ref 1.6–2.6)
MCHC RBC-ENTMCNC: 28.1 PG — SIGNIFICANT CHANGE UP (ref 27–34)
MCHC RBC-ENTMCNC: 32 GM/DL — SIGNIFICANT CHANGE UP (ref 32–36)
MCV RBC AUTO: 87.7 FL — SIGNIFICANT CHANGE UP (ref 80–100)
NRBC # BLD: 0 /100 WBCS — SIGNIFICANT CHANGE UP (ref 0–0)
PLATELET # BLD AUTO: 458 K/UL — HIGH (ref 150–400)
POTASSIUM SERPL-MCNC: 4.7 MMOL/L — SIGNIFICANT CHANGE UP (ref 3.5–5.3)
POTASSIUM SERPL-SCNC: 4.7 MMOL/L — SIGNIFICANT CHANGE UP (ref 3.5–5.3)
PROT SERPL-MCNC: 4.9 G/DL — LOW (ref 6–8.3)
RBC # BLD: 3.67 M/UL — LOW (ref 4.2–5.8)
RBC # FLD: 16.1 % — HIGH (ref 10.3–14.5)
SODIUM SERPL-SCNC: 135 MMOL/L — SIGNIFICANT CHANGE UP (ref 135–145)
WBC # BLD: 25.19 K/UL — HIGH (ref 3.8–10.5)
WBC # FLD AUTO: 25.19 K/UL — HIGH (ref 3.8–10.5)

## 2024-04-30 PROCEDURE — 99232 SBSQ HOSP IP/OBS MODERATE 35: CPT | Mod: GC

## 2024-04-30 PROCEDURE — 99221 1ST HOSP IP/OBS SF/LOW 40: CPT

## 2024-04-30 RX ORDER — FUROSEMIDE 40 MG
40 TABLET ORAL DAILY
Refills: 0 | Status: DISCONTINUED | OUTPATIENT
Start: 2024-04-30 | End: 2024-04-30

## 2024-04-30 RX ORDER — FUROSEMIDE 40 MG
40 TABLET ORAL DAILY
Refills: 0 | Status: DISCONTINUED | OUTPATIENT
Start: 2024-05-01 | End: 2024-05-02

## 2024-04-30 RX ORDER — SODIUM CHLORIDE 9 MG/ML
1000 INJECTION, SOLUTION INTRAVENOUS
Refills: 0 | Status: DISCONTINUED | OUTPATIENT
Start: 2024-04-30 | End: 2024-05-01

## 2024-04-30 RX ORDER — INSULIN LISPRO 100/ML
13 VIAL (ML) SUBCUTANEOUS
Refills: 0 | Status: DISCONTINUED | OUTPATIENT
Start: 2024-04-30 | End: 2024-04-30

## 2024-04-30 RX ORDER — INSULIN LISPRO 100/ML
15 VIAL (ML) SUBCUTANEOUS
Refills: 0 | Status: DISCONTINUED | OUTPATIENT
Start: 2024-04-30 | End: 2024-05-02

## 2024-04-30 RX ORDER — INSULIN GLARGINE 100 [IU]/ML
34 INJECTION, SOLUTION SUBCUTANEOUS AT BEDTIME
Refills: 0 | Status: DISCONTINUED | OUTPATIENT
Start: 2024-04-30 | End: 2024-05-02

## 2024-04-30 RX ADMIN — Medication 4: at 09:09

## 2024-04-30 RX ADMIN — ISOSORBIDE DINITRATE 30 MILLIGRAM(S): 5 TABLET ORAL at 06:20

## 2024-04-30 RX ADMIN — Medication 20 MILLIGRAM(S): at 06:14

## 2024-04-30 RX ADMIN — Medication 3: at 17:21

## 2024-04-30 RX ADMIN — INSULIN GLARGINE 34 UNIT(S): 100 INJECTION, SOLUTION SUBCUTANEOUS at 21:50

## 2024-04-30 RX ADMIN — ISOSORBIDE DINITRATE 30 MILLIGRAM(S): 5 TABLET ORAL at 17:29

## 2024-04-30 RX ADMIN — Medication 13 UNIT(S): at 09:09

## 2024-04-30 RX ADMIN — POLYETHYLENE GLYCOL 3350 17 GRAM(S): 17 POWDER, FOR SOLUTION ORAL at 12:55

## 2024-04-30 RX ADMIN — ISOSORBIDE DINITRATE 30 MILLIGRAM(S): 5 TABLET ORAL at 12:49

## 2024-04-30 RX ADMIN — Medication 40 MILLIGRAM(S): at 06:15

## 2024-04-30 RX ADMIN — Medication 20 MILLIGRAM(S): at 17:26

## 2024-04-30 RX ADMIN — CLOPIDOGREL BISULFATE 75 MILLIGRAM(S): 75 TABLET, FILM COATED ORAL at 12:49

## 2024-04-30 RX ADMIN — Medication 15 UNIT(S): at 17:22

## 2024-04-30 RX ADMIN — Medication 25 MILLIGRAM(S): at 06:16

## 2024-04-30 RX ADMIN — APIXABAN 2.5 MILLIGRAM(S): 2.5 TABLET, FILM COATED ORAL at 06:16

## 2024-04-30 RX ADMIN — Medication 13 UNIT(S): at 12:48

## 2024-04-30 RX ADMIN — Medication 25 MILLIGRAM(S): at 17:25

## 2024-04-30 RX ADMIN — Medication 4: at 12:48

## 2024-04-30 RX ADMIN — CHLORHEXIDINE GLUCONATE 1 APPLICATION(S): 213 SOLUTION TOPICAL at 12:55

## 2024-04-30 NOTE — PROGRESS NOTE ADULT - SUBJECTIVE AND OBJECTIVE BOX
Overnight events noted      VITAL:  T(C): , Max: 36.7 (04-29-24 @ 17:47)  T(F): , Max: 98 (04-29-24 @ 17:47)  HR: 76 (04-30-24 @ 04:05)  BP: 117/68 (04-30-24 @ 04:05)  BP(mean): --  RR: 18 (04-30-24 @ 04:05)  SpO2: 99% (04-30-24 @ 04:05)  Wt(kg): --      PHYSICAL EXAM:  Constitutional: NAD, Alert, fatigued  HEENT: NCAT, MMM  Neck: Supple, No JVD  Respiratory: CTA-b/l  Cardiovascular: RRR s1s2, no m/r/g  Gastrointestinal: BS+, soft, NT/ND  Extremities: No peripheral edema b/l  Neurological: no focal deficits; strength grossly intact  Back: no CVAT b/l  Skin: No rashes, no nevi      LABS:                        10.3   25.19 )-----------( 458      ( 30 Apr 2024 06:07 )             32.2     Na(135)/K(4.7)/Cl(102)/HCO3(21)/BUN(73)/Cr(1.56)Glu(273)/Ca(8.3)/Mg(2.2)/PO4(--)    04-30 @ 06:07  Na(131)/K(5.3)/Cl(104)/HCO3(13)/BUN(79)/Cr(1.78)Glu(306)/Ca(9.1)/Mg(2.5)/PO4(--)    04-29 @ 06:14  Na(132)/K(4.6)/Cl(102)/HCO3(17)/BUN(75)/Cr(1.93)Glu(112)/Ca(8.6)/Mg(--)/PO4(--)    04-28 @ 06:19    CPK 54538<==19,440<==28,933<==27,393<==22,835<==28,253 (4/25/24)      IMPRESSION: 69M w/ HTN, DM2, CAD, AFib, HFrEF, and CKD, s/p recent PCI, 4/25/24 returned with acute rhabdomyolysis    (1)Renal - CKD3-4 - due to diabetes/hypertension - at/near baseline  (2)Rhabdo - resolving - we can cut back further on the IVF  (3)Metabolic acidosis - improved, with HCO3 in the IVF  (4)Hyperkalemia - improving, with IVF + IV Lasix  (5)Rheum - 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 - Surgery and Rheum on board- potentially for muscle biopsy later this week    RECOMMEND:  (1)Reduce IVF to 50cc/h (1/2NS+75meq/L NaHCO3)  (2)Can convert Lasix to PO - 40qd  (3)Steroids per primary/Rheum  (4)Muscle biopsy per Surgery  (5)CPK/BMP/Mg daily      Markell Walton MD  [facility]  Office/on call physician: [phone]  Cell (7a-7p): [phone]       (+)stiffness/pain of legs b/l      VITAL:  T(C): , Max: 36.7 (04-29-24 @ 17:47)  T(F): , Max: 98 (04-29-24 @ 17:47)  HR: 76 (04-30-24 @ 04:05)  BP: 117/68 (04-30-24 @ 04:05)  BP(mean): --  RR: 18 (04-30-24 @ 04:05)  SpO2: 99% (04-30-24 @ 04:05)  Wt(kg): --      PHYSICAL EXAM:  Constitutional: alert, NAD  HEENT: NCAT, MMM  Neck: Supple, No JVD  Respiratory: CTA-b/l  Cardiovascular: RRR s1s2, no m/r/g  Gastrointestinal: BS+, soft, NT/ND  Extremities: (+)b/l LE edema/mild tenderness  Neurological: no focal deficits; strength grossly intact  Back: no CVAT b/l  Skin: No rashes, no nevi      LABS:                        10.3   25.19 )-----------( 458      ( 30 Apr 2024 06:07 )             32.2     Na(135)/K(4.7)/Cl(102)/HCO3(21)/BUN(73)/Cr(1.56)Glu(273)/Ca(8.3)/Mg(2.2)/PO4(--)    04-30 @ 06:07  Na(131)/K(5.3)/Cl(104)/HCO3(13)/BUN(79)/Cr(1.78)Glu(306)/Ca(9.1)/Mg(2.5)/PO4(--)    04-29 @ 06:14  Na(132)/K(4.6)/Cl(102)/HCO3(17)/BUN(75)/Cr(1.93)Glu(112)/Ca(8.6)/Mg(--)/PO4(--)    04-28 @ 06:19    CPK 75769<==19,440<==28,933<==27,393<==22,835<==28,253 (4/25/24)      IMPRESSION: 69M w/ HTN, DM2, CAD, AFib, HFrEF, and CKD, s/p recent PCI, 4/25/24 returned with acute rhabdomyolysis    (1)Renal - CKD3-4 - due to diabetes/hypertension - at/near baseline  (2)Rhabdo - resolving - we can cut back further on the IVF  (3)Metabolic acidosis - improved, with HCO3 in the IVF  (4)Hyperkalemia - improving, with IVF + IV Lasix  (5)Rheum - 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 - Surgery and Rheum on board- potentially for muscle biopsy later this week    RECOMMEND:  (1)Reduce IVF to 50cc/h (1/2NS+75meq/L NaHCO3)  (2)Lasix 40mg iv daily as ordered  (3)Steroids per primary/Rheum  (4)Muscle biopsy per Surgery  (5)CPK/BMP/Mg daily      Markell Walton MD  [facility]  Office/on call physician: [phone]  Cell (7a-7p): [phone]

## 2024-04-30 NOTE — CONSULT NOTE ADULT - ATTENDING COMMENTS
incidental subchondral insufficiency fracture of LFC. Accident 6 weeks ago. has been WBAT.  COntinue WBAT.  ROM as tolerated. f/u out pt
seen and examined 05-01-24 @ 0264    I reviewed the MRI pelvis / bilateral thigh images personally and with [facility] [place] [facility] radiology.    The quadriceps muscles are spared from the inflammatory process.  The left anterior gluteus medius muscle is near the origin, so not a good location for muscle biopsy as volume of fibers are limited. Similar issue with the distal tensor fascial lary near its junction with the iliotibial band.  There is R > L hamstring / adductor myositis which is more prominent proximally.      WBC = 31 (on methylprednisolone)  hgb - 10.9 g/dL  Cr - 1.3 (improving)  CPK - 07117 (improving)      myositis  -last dose of Eliquis was 4/30 0616  -plan right hamstring / adductor muscle biopsy tomorrow morning  -I discussed the procedure with the patient and his wife (by phone). I discussed the increased risk of perioperative bleeding since Plavix can not be stopped. I explained that the specimen will be sent to [facility]. Written informed consent was obtained for surgery.      care coordinated with Dr Ridley regarding surgery and need to stop therapeutic anticoagulation.
Full consult note as above; discussed with surgery resident and vascular fellow.   She was admitted with leg pain, swelling, and calf pain tenderness. He was noted to have an elevated CPK and ultimately has been diagnosed with myositis. He is to get a muscle  biopsy by general surgery.  He has known arterial disease but currently does not need any intervention.  He has a blister in the LT foot which was unroofed and treated with abx cream.  Due to his arterial disease no significant compression will be applied.  His arterial disease for now will be treated conservatively. Depending on how his situation resolves regarding the myositis and leg pain he can be seen as an outpatient.
I personally interviewed and examined the patient. Agree with rheumatology fellow's note above. 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 agree with work up and treatment plan as per note above

## 2024-04-30 NOTE — PROGRESS NOTE ADULT - ASSESSMENT
68 y/o man      h/o severe CAD S/P multiple PCI's including multiple LM and LAD stents.       +A-fib, HTN, HLD, T2DM, CKD3, former smoker     presents with  weaknss/   leg  pains.  no leg  weakness      + troponin /  elevated  cpk      CAD,  has   10  stents/  HTN/  HLD        cath,   4/18,  2.0 mm caliber TITO to distal LAD,Dr ILDA Dominguez        on  dapt/  ef   47     troponin,  likely   demand  ischemia.  not  mi  per  dr arellano,,  not a  candidate   for  further intervention,   small  caliber  vessels   c/c  Afib,  on eliquis      DM,  follow  fs/  endo  dr durant   CKD    rhabdomyolysis,/  elevated  CPK,    suspect  ?  statin induced.   and   statin  on hold    elevated lfts,   abd  u/s,  normal liver    doppler  legs.  with PAD/  will  f/p  a s an out pt with vascular. when stable   on asa/  eliquis, plavix, / lasix, imdur.  valsartan.  toprol      hold   Farxiga   and  valsartan for  now,  iv fluids,  per  renal   c/c  anemia     elevated  wbc.  afebrile,  doubt  infectious. and  no ab per  ID     follow   lfts/ cpk,   nee d to  r/o  polymyositis, vs  drug induced   myositis,       lfts  and  cpk . mild  decrease  on iv steroid.,  rheum following    mri  pelvis,  myositis/  fx  femoral condyle.  ortho  eval    ct a/p, neck, no  malignancy   await   bx   by   surg dr adam palomino/  on iv  steroid  per   rheum    discusse d with team, kennedy bolanos< from: TTE W or WO Ultrasound Enhancing Agent (01.29.24 @ 18:15) >  ONCLUSIONS:   1. Left ventricular cavity is small. Left ventricular wall thickness is normal. Left ventricular systolic function is normal with an ejection fraction of 47 % by Ulloa's method of disks.   2. Unable to evaluate wall motion due to poor image quality. There is possible apical hypokinesis.   3. There is severe (grade 3) left ventricular diastolic dysfunction, with elevated filling pressure.   4. Normal right ventricular cavity size and normalsystolic function.   5. There is mild tricuspid regurgitation. Estimated pulmonary artery systolic pressure is 23 mmHg.   6. Compared to the transthoracic echocardiogram performed on 11/26/2023, there have been no significant interval changes.  ____________________________________________________________________  Recommendations:  If clincially indicated, patient should return for repeat echocardiogram with contrast agent, for enhanced left ventricular opacification and improved delineation of the left ventricular endocardial borders. Would suggest a limited study with UEA/ contrast for better delineation of wall motion abnormalities.  ________________________________________________________________________________________  FINDINGS:  < end of copied text >         rad    -  70 y/o man      h/o severe CAD S/P multiple PCI's including multiple LM and LAD stents.       +A-fib, HTN, HLD, T2DM, CKD3, former smoker     presents with  weaknss/   leg  pains.  no leg  weakness      + troponin /  elevated  cpk      CAD,  has   10  stents/  HTN/  HLD        cath,   4/18,  2.0 mm caliber TITO to distal LAD,Dr ILDA Dominguez        on  dapt/  ef   47     troponin,  likely   demand  ischemia.  not  mi  per  dr arellano,,  not a  candidate   for  further intervention,   small  caliber  vessels   c/c  Afib,  on eliquis      DM,  follow  fs/  endo  dr durant   CKD    rhabdomyolysis,/  elevated  CPK,    suspect  ?  statin induced.   and   statin  on hold    elevated lfts,   abd  u/s,  normal liver    doppler  legs.  with PAD/  will  f/p  a s an out pt with vascular. when stable   on asa/  eliquis, plavix, / lasix, imdur.  valsartan.  toprol      hold   Farxiga   and  valsartan for  now,  iv fluids,  per  renal   c/c  anemia     elevated  wbc.  afebrile,  doubt  infectious. and  no ab per  ID     follow   lfts/ cpk,   nee d to  r/o  polymyositis, vs  drug induced   myositis,       lfts  and  cpk . mild  decrease  on iv steroid.,  rheum following    mri  pelvis,  myositis/  fx  femoral condyle.  ortho  eval    ct a/p, neck, no  malignancy   await   bx   by   surg dr adam palomino/  on iv  steroid  per   rheum     will  need  card  input, dr edwards  regarding holding  eliquis/ plavix, in  light  of recent    stent/on  4/17/24.  for  bx      option  may  to   temporarily hold  eliquis  and   continue   plavix, perhaps     ,  will  await   card  rec         discussed  with team, kennedy bolanos< from: TTE W or WO Ultrasound Enhancing Agent (01.29.24 @ 18:15) >  ONCLUSIONS:   1. Left ventricular cavity is small. Left ventricular wall thickness is normal. Left ventricular systolic function is normal with an ejection fraction of 47 % by Ulloa's method of disks.   2. Unable to evaluate wall motion due to poor image quality. There is possible apical hypokinesis.   3. There is severe (grade 3) left ventricular diastolic dysfunction, with elevated filling pressure.   4. Normal right ventricular cavity size and normalsystolic function.   5. There is mild tricuspid regurgitation. Estimated pulmonary artery systolic pressure is 23 mmHg.   6. Compared to the transthoracic echocardiogram performed on 11/26/2023, there have been no significant interval changes.  ____________________________________________________________________  Recommendations:  If clincially indicated, patient should return for repeat echocardiogram with contrast agent, for enhanced left ventricular opacification and improved delineation of the left ventricular endocardial borders. Would suggest a limited study with UEA/ contrast for better delineation of wall motion abnormalities.  ________________________________________________________________________________________  FINDINGS:  < end of copied text >         rad    -  70 y/o man      h/o severe CAD S/P multiple PCI's including multiple LM and LAD stents.       +A-fib, HTN, HLD, T2DM, CKD3, former smoker     presents with  weaknss/   leg  pains.  no leg  weakness      + troponin /  elevated  cpk      CAD,  has   10  stents/  HTN/  HLD        cath,   4/18,  2.0 mm caliber TITO to distal LAD,Dr ILDA Dominguez        on  dapt/  ef   47     troponin,  likely   demand  ischemia.  not  mi  per  dr arellano,,  not a  candidate   for  further intervention,   small  caliber  vessels   c/c  Afib,  on eliquis      DM,  follow  fs/  endo  dr durant   CKD    rhabdomyolysis,/  elevated  CPK,    suspect  ?  statin induced.   and   statin  on hold    elevated lfts,   abd  u/s,  normal liver    doppler  legs.  with PAD/  will  f/p  a s an out pt with vascular. when stable   on asa/  eliquis, plavix, / lasix, imdur.  valsartan.  toprol      hold   Farxiga   and  valsartan for  now,  iv fluids,  per  renal   c/c  anemia     elevated  wbc.  afebrile,  doubt  infectious. and  no ab per  ID     follow   lfts/ cpk,   nee d to  r/o  polymyositis, vs  drug induced   myositis,       lfts  and  cpk . mild  decrease  on iv steroid.,  rheum following    mri  pelvis,  myositis/  fx  femoral condyle.  ortho  eval    ct a/p, neck, no  malignancy   await   bx   by   surg dr adam palomino/  on iv  steroid  per   rheum     will  need  card  input, dr edwards  regarding holding  eliquis/ plavix, in  light  of recent    stent/on  4/17/24.  for  bx      option  may  to   temporarily hold  eliquis  and   continue   plavix, perhaps     will  await   card  rec         discussed  with team, kennedy bolanos< from: TTE W or WO Ultrasound Enhancing Agent (01.29.24 @ 18:15) >  ONCLUSIONS:   1. Left ventricular cavity is small. Left ventricular wall thickness is normal. Left ventricular systolic function is normal with an ejection fraction of 47 % by Ulloa's method of disks.   2. Unable to evaluate wall motion due to poor image quality. There is possible apical hypokinesis.   3. There is severe (grade 3) left ventricular diastolic dysfunction, with elevated filling pressure.   4. Normal right ventricular cavity size and normalsystolic function.   5. There is mild tricuspid regurgitation. Estimated pulmonary artery systolic pressure is 23 mmHg.   6. Compared to the transthoracic echocardiogram performed on 11/26/2023, there have been no significant interval changes.  ____________________________________________________________________  Recommendations:  If clincially indicated, patient should return for repeat echocardiogram with contrast agent, for enhanced left ventricular opacification and improved delineation of the left ventricular endocardial borders. Would suggest a limited study with UEA/ contrast for better delineation of wall motion abnormalities.  ________________________________________________________________________________________  FINDINGS:  < end of copied text >         rad    -  68 y/o man      h/o severe CAD S/P multiple PCI's including multiple LM and LAD stents.       +A-fib, HTN, HLD, T2DM, CKD3, former smoker     presents with  weaknss/   leg  pains.  no leg  weakness      + troponin /  elevated  cpk      CAD,  has   10  stents/  HTN/  HLD        cath,   4/18,  2.0 mm caliber TITO to distal LAD,Dr ILDA Dominguez        on  dapt/  ef   47     troponin,  likely   demand  ischemia.  not  mi  per  dr arellano,,  not a  candidate   for  further intervention,   small  caliber  vessels   c/c  Afib,  on eliquis      DM,  follow  fs/  endo  dr durant   CKD    rhabdomyolysis,/  elevated  CPK,    suspect  ?  statin induced.   and   statin  on hold    elevated lfts,   abd  u/s,  normal liver    doppler  legs.  with PAD/  will  f/p  a s an out pt with vascular. when stable   on asa/  eliquis, plavix, / lasix, imdur.  valsartan.  toprol      hold   Farxiga   and  valsartan for  now,  iv fluids,  per  renal   c/c  anemia     elevated  wbc.  afebrile,  doubt  infectious. and  no ab per  ID     follow   lfts/ cpk,   nee d to  r/o  polymyositis, vs  drug induced   myositis,       lfts  and  cpk . mild  decrease  on iv steroid.,  rheum following     high  wbc/  hypergycemia   mri  pelvis,  myositis/  fx  femoral condyle.  ortho  eval    ct a/p, neck, no  malignancy   await   bx   by   surg dr adam palomino/  on iv  steroid  per   rheum     will  need  card  input, dr edwards  regarding holding  eliquis/ plavix, in  light  of recent    stent/on  4/17/24.  for  bx      option  may  to   temporarily hold  eliquis  and   continue   plavix, perhaps     will  await   card  rec/ dr greta bravo covering         discussed  with team, kennedy bolanos< from: TTE W or WO Ultrasound Enhancing Agent (01.29.24 @ 18:15) >  ONCLUSIONS:   1. Left ventricular cavity is small. Left ventricular wall thickness is normal. Left ventricular systolic function is normal with an ejection fraction of 47 % by Ulloa's method of disks.   2. Unable to evaluate wall motion due to poor image quality. There is possible apical hypokinesis.   3. There is severe (grade 3) left ventricular diastolic dysfunction, with elevated filling pressure.   4. Normal right ventricular cavity size and normalsystolic function.   5. There is mild tricuspid regurgitation. Estimated pulmonary artery systolic pressure is 23 mmHg.   6. Compared to the transthoracic echocardiogram performed on 11/26/2023, there have been no significant interval changes.  ____________________________________________________________________  Recommendations:  If clincially indicated, patient should return for repeat echocardiogram with contrast agent, for enhanced left ventricular opacification and improved delineation of the left ventricular endocardial borders. Would suggest a limited study with UEA/ contrast for better delineation of wall motion abnormalities.  ________________________________________________________________________________________  FINDINGS:  < end of copied text >         rad    -  68 y/o man      h/o severe CAD S/P multiple PCI's including multiple LM and LAD stents.       +A-fib, HTN, HLD, T2DM, CKD3, former smoker     presents with  weaknss/   leg  pains.  no leg  weakness      + troponin /  elevated  cpk      CAD,  has   10  stents/  HTN/  HLD        cath,   4/18,  2.0 mm caliber TITO to distal LAD,Dr ILDA Dominguez        on  dapt/  ef   47     troponin,  likely   demand  ischemia.  not  mi  per  dr arellano,,  not a  candidate   for  further intervention,   small  caliber  vessels   c/c  Afib,  on eliquis      DM,  follow  fs/  endo  dr durant   CKD    rhabdomyolysis,/  elevated  CPK,    suspect  ?  statin induced.   and   statin  on hold    elevated lfts,   abd  u/s,  normal liver    doppler  legs.  with PAD/  will  f/p  a s an out pt with vascular. when stable   on asa/  eliquis, plavix, / lasix, imdur.  valsartan.  toprol      hold   Farxiga   and  valsartan for  now,  iv fluids,  per  renal   c/c  anemia     elevated  wbc.  afebrile,  doubt  infectious. and  no ab per  ID     follow   lfts/ cpk,   nee d to  r/o  polymyositis, vs  drug induced   myositis,       lfts  and  cpk . mild  decrease  on iv steroid.,  rheum following     high  wbc/  hypergycemia   mri  pelvis,  myositis/  fx  femoral condyle.  ortho  eval    ct a/p, neck, no  malignancy   await   bx   by   surg dr adam palomino/  on iv  steroid  per   rheum     will  need  card  input, dr edwards  regarding holding  eliquis/ plavix, in  light  of recent    stent/on  4/17/24.  for  bx     option , to   temporarily hold  eliquis  and   continue   plavix,      and   card  dr bravo and  nunu dominguez in  agreement    discussed  with  surg  dr adam palomino,  awiating  timing  of  bx, in order  to  hole   eliquis/  surg  will  get  back          discussed  with team, kennedy bolanos< from: TTE W or WO Ultrasound Enhancing Agent (01.29.24 @ 18:15) >  ONCLUSIONS:   1. Left ventricular cavity is small. Left ventricular wall thickness is normal. Left ventricular systolic function is normal with an ejection fraction of 47 % by Ulloa's method of disks.   2. Unable to evaluate wall motion due to poor image quality. There is possible apical hypokinesis.   3. There is severe (grade 3) left ventricular diastolic dysfunction, with elevated filling pressure.   4. Normal right ventricular cavity size and normalsystolic function.   5. There is mild tricuspid regurgitation. Estimated pulmonary artery systolic pressure is 23 mmHg.   6. Compared to the transthoracic echocardiogram performed on 11/26/2023, there have been no significant interval changes.  ____________________________________________________________________  Recommendations:  If clincially indicated, patient should return for repeat echocardiogram with contrast agent, for enhanced left ventricular opacification and improved delineation of the left ventricular endocardial borders. Would suggest a limited study with UEA/ contrast for better delineation of wall motion abnormalities.  ________________________________________________________________________________________  FINDINGS:  < end of copied text >         rad    -  68 y/o man      h/o severe CAD S/P multiple PCI's including multiple LM and LAD stents.       +A-fib, HTN, HLD, T2DM, CKD3, former smoker     presents with  weaknss/   leg  pains.  no leg  weakness      + troponin /  elevated  cpk      CAD,  has   10  stents/  HTN/  HLD        cath,   4/18,  2.0 mm caliber TITO to distal LAD,Dr ILDA Dominguez        on  dapt/  ef   47     troponin,  likely   demand  ischemia.  not  mi  per  dr arellano,,  not a  candidate   for  further intervention,   small  caliber  vessels   c/c  Afib,  on eliquis      DM,  follow  fs/  endo  dr durant   CKD    rhabdomyolysis,/  elevated  CPK,    suspect  ?  statin induced.   and   statin  on hold    elevated lfts,   abd  u/s,  normal liver    doppler  legs.  with PAD/  will  f/p  a s an out pt with vascular. when stable   on asa/  eliquis, plavix, / lasix, imdur.  valsartan.  toprol      hold   Farxiga   and  valsartan for  now,  iv fluids,  per  renal   c/c  anemia     elevated  wbc.  afebrile,  doubt  infectious. and  no ab per  ID     follow   lfts/ cpk,   nee d to  r/o  polymyositis, vs  drug induced   myositis,       lfts  and  cpk . mild  decrease  on iv steroid.,  rheum following     high  wbc/  hypergycemia   mri  pelvis,  myositis/  fx  femoral condyle.  ortho  eval    ct a/p, neck, no  malignancy   await   bx   by   surg dr adam palomino/  on iv  steroid  per   rheum     will  need  card  input, dr edwards  regarding holding  eliquis/ plavix, in  light  of recent    stent/on  4/17/24.  for  bx     option , to   temporarily hold  eliquis  and   continue   plavix,      and   card  dr bravo and  nunu dominguez in  agreement    discussed  with  surg  dr adam palomino,    bx  on  wed.  and   eliquis  stopped juan         discussed  with team, kennedy bolanos< from: TTE W or WO Ultrasound Enhancing Agent (01.29.24 @ 18:15) >  ONCLUSIONS:   1. Left ventricular cavity is small. Left ventricular wall thickness is normal. Left ventricular systolic function is normal with an ejection fraction of 47 % by Ulloa's method of disks.   2. Unable to evaluate wall motion due to poor image quality. There is possible apical hypokinesis.   3. There is severe (grade 3) left ventricular diastolic dysfunction, with elevated filling pressure.   4. Normal right ventricular cavity size and normalsystolic function.   5. There is mild tricuspid regurgitation. Estimated pulmonary artery systolic pressure is 23 mmHg.   6. Compared to the transthoracic echocardiogram performed on 11/26/2023, there have been no significant interval changes.  ____________________________________________________________________  Recommendations:  If clincially indicated, patient should return for repeat echocardiogram with contrast agent, for enhanced left ventricular opacification and improved delineation of the left ventricular endocardial borders. Would suggest a limited study with UEA/ contrast for better delineation of wall motion abnormalities.  ________________________________________________________________________________________  FINDINGS:  < end of copied text >         rad    -
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
## 2024-04-30 NOTE — PROGRESS NOTE ADULT - SUBJECTIVE AND OBJECTIVE BOX
SUBJECTIVE: Notes diffuse muscle weakness and pains.   	  MEDICATIONS:  isosorbide   dinitrate Tablet (ISORDIL) 30 milliGRAM(s) Oral three times a day  metoprolol tartrate 25 milliGRAM(s) Oral two times a day  nitroglycerin     SubLingual 0.4 milliGRAM(s) SubLingual every 5 minutes PRN  acetaminophen     Tablet .. 650 milliGRAM(s) Oral every 6 hours PRN  polyethylene glycol 3350 17 Gram(s) Oral daily  senna 2 Tablet(s) Oral at bedtime  dextrose 50% Injectable 12.5 Gram(s) IV Push once  dextrose 50% Injectable 25 Gram(s) IV Push once  dextrose Oral Gel 15 Gram(s) Oral once PRN  glucagon  Injectable 1 milliGRAM(s) IntraMuscular once  insulin glargine Injectable (LANTUS) 34 Unit(s) SubCutaneous at bedtime  insulin lispro (ADMELOG) corrective regimen sliding scale   SubCutaneous three times a day before meals  insulin lispro (ADMELOG) corrective regimen sliding scale   SubCutaneous at bedtime  insulin lispro Injectable (ADMELOG) 13 Unit(s) SubCutaneous three times a day before meals  methylPREDNISolone sodium succinate Injectable 20 milliGRAM(s) IV Push two times a day  apixaban 2.5 milliGRAM(s) Oral every 12 hours  chlorhexidine 2% Cloths 1 Application(s) Topical daily  clopidogrel Tablet 75 milliGRAM(s) Oral daily  dextrose 10% Bolus 125 milliLiter(s) IV Bolus once  dextrose 5%. 1000 milliLiter(s) IV Continuous <Continuous>  dextrose 5%. 1000 milliLiter(s) IV Continuous <Continuous>  influenza  Vaccine (HIGH DOSE) 0.7 milliLiter(s) IntraMuscular once  sodium bicarbonate  Infusion 0.127 mEq/kG/Hr IV Continuous <Continuous>      REVIEW OF SYSTEMS:    CONSTITUTIONAL: No fever, weight loss, or fatigue  EYES: No eye pain, visual disturbances, or discharge  NECK: No pain or stiffness  RESPIRATORY: No cough, wheezing, chills or hemoptysis; No Shortness of Breath  CARDIOVASCULAR: No chest pain, palpitations, dizziness, or leg swelling  GASTROINTESTINAL: No abdominal or epigastric pain. No nausea, vomiting, or hematemesis; No diarrhea or constipation. No melena or hematochezia.  GENITOURINARY: No dysuria, frequency, hematuria, or incontinence  NEUROLOGICAL: No headaches, memory loss, loss of strength, numbness, or tremors  SKIN: No itching, burning, rashes, or lesions   LYMPH Nodes: No enlarged glands  MUSCULOSKELETAL: very weak  All other review of systems are negative.  	      PHYSICAL EXAM:  T(C): 36.3 (04-30-24 @ 04:05), Max: 36.7 (04-29-24 @ 17:47)  HR: 76 (04-30-24 @ 04:05) (76 - 99)  BP: 117/68 (04-30-24 @ 04:05) (101/67 - 152/83)  RR: 18 (04-30-24 @ 04:05) (18 - 18)  SpO2: 99% (04-30-24 @ 04:05) (97% - 99%)  Wt(kg): --  I&O's Summary    29 Apr 2024 07:01  -  30 Apr 2024 07:00  --------------------------------------------------------  IN: 1170 mL / OUT: 3050 mL / NET: -1880 mL    30 Apr 2024 07:01  -  30 Apr 2024 10:45  --------------------------------------------------------  IN: 0 mL / OUT: 550 mL / NET: -550 mL          PHYSICAL EXAM    Appearance: Normal	  HEENT:   Normal oral mucosa, PERRL, EOMI	  NECK: Soft and supple, No LAD, No JVD  Cardiovascular: Regular Rate and Rhythm, Normal S1 S2, No murmurs, No clicks, gallops or rubs  Respiratory: Lungs clear to auscultation	  Neurologic: Non-focal  Extremities: No clubbing, cyanosis or edema    LABS:	 	               10.3   25.19 )-----------( 458      ( 30 Apr 2024 06:07 )             32.2     04-30    135  |  102  |  73<H>  ----------------------------<  273<H>  4.7   |  21<L>  |  1.56<H>    Ca    8.3<L>      30 Apr 2024 06:07  Mg     2.2     04-30    TPro  4.9<L>  /  Alb  2.7<L>  /  TBili  0.6  /  DBili  x   /  AST  696<H>  /  ALT  764<H>  /  AlkPhos  124<H>  04-30

## 2024-04-30 NOTE — PROGRESS NOTE ADULT - SUBJECTIVE AND OBJECTIVE BOX
date of service: 04-30-24 @ 09:50  afbeirle  REVIEW OF SYSTEMS:  CONSTITUTIONAL: No fever,  no  weight loss  ENT:  No  tinnitus,   no   vertigo  NECK: No pain or stiffness  RESPIRATORY: No cough, wheezing, chills or hemoptysis;    No Shortness of Breath  CARDIOVASCULAR: No chest pain, palpitations, dizziness  GASTROINTESTINAL: No abdominal or epigastric pain. No nausea, vomiting, or hematemesis; No diarrhea  No melena or hematochezia.  GENITOURINARY: No dysuria, frequency, hematuria, or incontinence  NEUROLOGICAL: No headaches  SKIN: No itching,  no   rash  LYMPH Nodes: No enlarged glands  ENDOCRINE: No heat or cold intolerance  MUSCULOSKELETAL: No joint pain or swelling  PSYCHIATRIC: No depression, anxiety  HEME/LYMPH: No easy bruising, or bleeding gums  ALLERGY AND IMMUNOLOGIC: No hives or eczema	    MEDICATIONS  (STANDING):  apixaban 2.5 milliGRAM(s) Oral every 12 hours  chlorhexidine 2% Cloths 1 Application(s) Topical daily  clopidogrel Tablet 75 milliGRAM(s) Oral daily  dextrose 10% Bolus 125 milliLiter(s) IV Bolus once  dextrose 5%. 1000 milliLiter(s) (50 mL/Hr) IV Continuous <Continuous>  dextrose 5%. 1000 milliLiter(s) (100 mL/Hr) IV Continuous <Continuous>  dextrose 50% Injectable 12.5 Gram(s) IV Push once  dextrose 50% Injectable 25 Gram(s) IV Push once  glucagon  Injectable 1 milliGRAM(s) IntraMuscular once  influenza  Vaccine (HIGH DOSE) 0.7 milliLiter(s) IntraMuscular once  insulin glargine Injectable (LANTUS) 34 Unit(s) SubCutaneous at bedtime  insulin lispro (ADMELOG) corrective regimen sliding scale   SubCutaneous three times a day before meals  insulin lispro (ADMELOG) corrective regimen sliding scale   SubCutaneous at bedtime  insulin lispro Injectable (ADMELOG) 13 Unit(s) SubCutaneous three times a day before meals  isosorbide   dinitrate Tablet (ISORDIL) 30 milliGRAM(s) Oral three times a day  methylPREDNISolone sodium succinate Injectable 20 milliGRAM(s) IV Push two times a day  metoprolol tartrate 25 milliGRAM(s) Oral two times a day  polyethylene glycol 3350 17 Gram(s) Oral daily  senna 2 Tablet(s) Oral at bedtime  sodium bicarbonate  Infusion 0.127 mEq/kG/Hr (100 mL/Hr) IV Continuous <Continuous>    MEDICATIONS  (PRN):  acetaminophen     Tablet .. 650 milliGRAM(s) Oral every 6 hours PRN Temp greater or equal to 38C (100.4F), Mild Pain (1 - 3)  dextrose Oral Gel 15 Gram(s) Oral once PRN Blood Glucose LESS THAN 70 milliGRAM(s)/deciliter  nitroglycerin     SubLingual 0.4 milliGRAM(s) SubLingual every 5 minutes PRN Chest Pain      Vital Signs Last 24 Hrs  T(C): 36.3 (30 Apr 2024 04:05), Max: 36.7 (29 Apr 2024 17:47)  T(F): 97.4 (30 Apr 2024 04:05), Max: 98 (29 Apr 2024 17:47)  HR: 76 (30 Apr 2024 04:05) (76 - 99)  BP: 117/68 (30 Apr 2024 04:05) (101/67 - 152/83)  BP(mean): --  RR: 18 (30 Apr 2024 04:05) (18 - 18)  SpO2: 99% (30 Apr 2024 04:05) (97% - 99%)    Parameters below as of 30 Apr 2024 04:05  Patient On (Oxygen Delivery Method): room air      CAPILLARY BLOOD GLUCOSE      POCT Blood Glucose.: 324 mg/dL (30 Apr 2024 09:06)  POCT Blood Glucose.: 296 mg/dL (30 Apr 2024 07:58)  POCT Blood Glucose.: 352 mg/dL (29 Apr 2024 21:49)  POCT Blood Glucose.: 370 mg/dL (29 Apr 2024 16:45)  POCT Blood Glucose.: 376 mg/dL (29 Apr 2024 11:57)    I&O's Summary    29 Apr 2024 07:01  -  30 Apr 2024 07:00  --------------------------------------------------------  IN: 1170 mL / OUT: 3050 mL / NET: -1880 mL    30 Apr 2024 07:01  -  30 Apr 2024 09:50  --------------------------------------------------------  IN: 0 mL / OUT: 550 mL / NET: -550 mL          Appearance: Normal	  HEENT:   Normal oral mucosa, PERRL, EOMI	  Lymphatic: No lymphadenopathy  Cardiovascular: Normal S1 S2, No JVD  Respiratory: Lungs clear to auscultation	  Gastrointestinal:  Soft, Non-tender, + BS	  Skin: No rash, No ecchymoses	  Extremities:     LABS:                        10.3   25.19 )-----------( 458      ( 30 Apr 2024 06:07 )             32.2     04-30    135  |  102  |  73<H>  ----------------------------<  273<H>  4.7   |  21<L>  |  1.56<H>    Ca    8.3<L>      30 Apr 2024 06:07  Mg     2.2     04-30    TPro  4.9<L>  /  Alb  2.7<L>  /  TBili  0.6  /  DBili  x   /  AST  696<H>  /  ALT  764<H>  /  AlkPhos  124<H>  04-30      CARDIAC MARKERS ( 30 Apr 2024 06:07 )  x     / x     / 13978 U/L / x     / x      CARDIAC MARKERS ( 29 Apr 2024 06:14 )  x     / x     / 92807 U/L / x     / x          Urinalysis Basic - ( 30 Apr 2024 06:07 )    Color: x / Appearance: x / SG: x / pH: x  Gluc: 273 mg/dL / Ketone: x  / Bili: x / Urobili: x   Blood: x / Protein: x / Nitrite: x   Leuk Esterase: x / RBC: x / WBC x   Sq Epi: x / Non Sq Epi: x / Bacteria: x              Thyroid Stimulating Hormone, Serum: 1.76 uIU/mL (02-10 @ 07:24)          Consultant(s) Notes Reviewed:      Care Discussed with Consultants/Other Providers:

## 2024-04-30 NOTE — PROGRESS NOTE ADULT - ASSESSMENT
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 The sudden onset of cramping weakness, however, is unusual for an inflammatory myositis as it is usually a insidious/chronic process over weeks/months. Additionally, the rapid downtrend in CK is also unusual for inflammatory myositis, and suspect it may be more 2/2 fluids rather than the actual steroids that he has received so far. Will need additional testing to make a diagnosis,       Treatment is to discontinue the offending statin and avoid future use. Pt will likely need further immunosuppression given significant weakness on exam. To confirm the diagnosis and rule out other causes of inflammatory myositis, will send additional serology and obtain muscle biopsy. In the meantime, will start immunosuppressive treatment to avoid further muscle damage.     Plan:  -c/w solumedrol 20mg q12 to treat suspected inflammatory myositis    -f/u QuantiFeron, hepatitis B/C, myomarker panel, HMGCoA reductase, immunoglobulins  -please consult surgery for muscle biopsy of thigh (can target most superficial/feasible site of thigh musculature)  -would obtain speech/swallow eval as pt describes dysphagia to solid food only    #per inflammatory myositis malignancy screening guidelines: Pt is intermediate risk for malignancy (reports normal C-scope 2024)  -CT neck, chest, abdomen, pelvis WNL  -PSA WNL  -Elevated ESR/CRP which likely due to suspected inflammatory myositis  -f/u SPEP/UPEP/immunofixation, esr/crp    Discussed with Attending Dr. Nahun Mary, DO  Rheumatology Fellow  Available on TEAMS    Reference: International Guideline for Idiopathic Inflammatory Myopathy-Associated Cancer Screening: an International Myositis Assessment and Clinical Studies Group (IMACS) initiative     70 yo M PMH HLD, CAD (s/p 10 stents, last 4/2024), Afib on Eliquis, HFrEF, CKD3 2/2 DM/HTN p/w LE pain/cramping found to have CK 28K     #significant proximal muscle weakness (thighs>shoulders) w/ CK 28K unresponsive to fluids (5-7L)  #low grade CK elevation 971 (1/29/24), 1318 (2/9/24) in past  #on home lipitor 80mg  #MRI thighs showing nonspecific myositis involving b/l hip girdle/thighs    Constellation of symptoms and findings above 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 The sudden onset of cramping weakness, however, is unusual for an inflammatory myositis as it is usually a insidious/chronic process over weeks/months. Additionally, the rapid downtrend in CK is also unusual for inflammatory myositis, and suspect it may be more 2/2 fluids rather than the actual steroids that he has received so far. Will need additional testing to make a diagnosis    Plan:  -pt w/ worsening leg edema, likely 2/2 combo fluids/steroids, would limit fluids if possible and c/w steroid treatment for now  -c/w solumedrol 20mg q12 to treat suspected inflammatory myositis    -f/u QuantiFeron, hepatitis B/C, myomarker panel, HMGCoA reductase, immunoglobulins  -please obtain muscle biopsy of thigh (can target most superficial/feasible site of thigh musculature as there is diffuse myositis)  -would obtain speech/swallow eval to r/o dysphagia    #per inflammatory myositis malignancy screening guidelines: Pt is intermediate risk for malignancy (reports normal C-scope 2024)  -CT neck, chest, abdomen, pelvis WNL  -PSA WNL  -Elevated ESR/CRP which likely due to suspected inflammatory myositis  -f/u SPEP/UPEP/immunofixation    Discussed with Attending Dr. Nahun Mary, DO  Rheumatology Fellow  Available on TEAMS    Reference: International Guideline for Idiopathic Inflammatory Myopathy-Associated Cancer Screening: an International Myositis Assessment and Clinical Studies Group (IMACS) initiative 68 yo M PMH HLD, CAD (s/p 10 stents, last 4/2024), Afib on Eliquis, HFrEF, CKD3 2/2 DM/HTN p/w LE pain/cramping found to have CK 28K     #significant proximal muscle weakness (thighs>shoulders) w/ CK 28K unresponsive to fluids (5-7L)  #low grade CK elevation 971 (1/29/24), 1318 (2/9/24) in past  #on home lipitor 80mg  #MRI thighs showing nonspecific myositis involving b/l hip girdle/thighs    Constellation of symptoms and findings above 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 The sudden onset of cramping weakness, however, is unusual for an inflammatory myositis as it is usually a insidious/chronic process over weeks/months. Additionally, the rapid downtrend in CK is also unusual for inflammatory myositis, and suspect it may be more 2/2 fluids rather than the actual steroids that he has received so far. Will need additional testing to make a diagnosis    Plan:  -pt w/ worsening leg edema, likely 2/2 combo fluids/steroids, would limit fluids if possible and c/w steroid treatment for now  -c/w solumedrol 20mg q12 to treat suspected inflammatory myositis    -f/u QuantiFeron, hepatitis B/C, myomarker panel, HMGCoA reductase, immunoglobulins  -please obtain muscle biopsy of thigh   Consider muscle biopsy of left proximal anterior gluteus medius or ipsilateral distal tensor fascial lary.  -would obtain speech/swallow eval to r/o dysphagia    #per inflammatory myositis malignancy screening guidelines: Pt is intermediate risk for malignancy (reports normal C-scope 2024)  -CT neck, chest, abdomen, pelvis WNL  -PSA WNL  -Elevated ESR/CRP which likely due to suspected inflammatory myositis  -f/u SPEP/UPEP/immunofixation    Discussed with Attending Dr. Nahun Mary, DO  Rheumatology Fellow  Available on TEAMS    Reference: International Guideline for Idiopathic Inflammatory Myopathy-Associated Cancer Screening: an International Myositis Assessment and Clinical Studies Group (IMACS) initiative

## 2024-04-30 NOTE — PROGRESS NOTE ADULT - SUBJECTIVE AND OBJECTIVE BOX
Chief complaint  Patient is a 69y old  Male who presents with a chief complaint of Rhabdo (30 Apr 2024 11:18)         Labs and Fingersticks  CAPILLARY BLOOD GLUCOSE      POCT Blood Glucose.: 314 mg/dL (30 Apr 2024 12:24)  POCT Blood Glucose.: 324 mg/dL (30 Apr 2024 09:06)  POCT Blood Glucose.: 296 mg/dL (30 Apr 2024 07:58)  POCT Blood Glucose.: 352 mg/dL (29 Apr 2024 21:49)  POCT Blood Glucose.: 370 mg/dL (29 Apr 2024 16:45)      Anion Gap: 12 (04-30 @ 06:07)  Anion Gap: 14 (04-29 @ 06:14)      Calcium: 8.3 *L* (04-30 @ 06:07)  Calcium: 9.1 (04-29 @ 06:14)  Albumin: 2.7 *L* (04-30 @ 06:07)  Albumin: 2.6 *L* (04-29 @ 06:14)    Alanine Aminotransferase (ALT/SGPT): 764 *H* (04-30 @ 06:07)  Alanine Aminotransferase (ALT/SGPT): 752 *H* (04-29 @ 06:14)  Alkaline Phosphatase: 124 *H* (04-30 @ 06:07)  Alkaline Phosphatase: 128 *H* (04-29 @ 06:14)  Aspartate Aminotransferase (AST/SGOT): 696 *H* (04-30 @ 06:07)  Aspartate Aminotransferase (AST/SGOT): 909 *H* (04-29 @ 06:14)        04-30    135  |  102  |  73<H>  ----------------------------<  273<H>  4.7   |  21<L>  |  1.56<H>    Ca    8.3<L>      30 Apr 2024 06:07  Mg     2.2     04-30    TPro  4.9<L>  /  Alb  2.7<L>  /  TBili  0.6  /  DBili  x   /  AST  696<H>  /  ALT  764<H>  /  AlkPhos  124<H>  04-30                        10.3   25.19 )-----------( 458      ( 30 Apr 2024 06:07 )             32.2     Medications  MEDICATIONS  (STANDING):  chlorhexidine 2% Cloths 1 Application(s) Topical daily  clopidogrel Tablet 75 milliGRAM(s) Oral daily  dextrose 10% Bolus 125 milliLiter(s) IV Bolus once  dextrose 5%. 1000 milliLiter(s) (100 mL/Hr) IV Continuous <Continuous>  dextrose 5%. 1000 milliLiter(s) (50 mL/Hr) IV Continuous <Continuous>  dextrose 50% Injectable 12.5 Gram(s) IV Push once  dextrose 50% Injectable 25 Gram(s) IV Push once  glucagon  Injectable 1 milliGRAM(s) IntraMuscular once  influenza  Vaccine (HIGH DOSE) 0.7 milliLiter(s) IntraMuscular once  insulin glargine Injectable (LANTUS) 34 Unit(s) SubCutaneous at bedtime  insulin lispro (ADMELOG) corrective regimen sliding scale   SubCutaneous three times a day before meals  insulin lispro (ADMELOG) corrective regimen sliding scale   SubCutaneous at bedtime  insulin lispro Injectable (ADMELOG) 15 Unit(s) SubCutaneous three times a day before meals  isosorbide   dinitrate Tablet (ISORDIL) 30 milliGRAM(s) Oral three times a day  methylPREDNISolone sodium succinate Injectable 20 milliGRAM(s) IV Push two times a day  metoprolol tartrate 25 milliGRAM(s) Oral two times a day  polyethylene glycol 3350 17 Gram(s) Oral daily  senna 2 Tablet(s) Oral at bedtime  sodium bicarbonate  Infusion 0.127 mEq/kG/Hr (100 mL/Hr) IV Continuous <Continuous>      Physical Exam  General: Patient comfortable in bed   Vital Signs Last 12 Hrs  T(F): 97.3 (04-30-24 @ 11:21), Max: 97.4 (04-30-24 @ 04:05)  HR: 80 (04-30-24 @ 11:21) (76 - 80)  BP: 129/74 (04-30-24 @ 11:21) (117/68 - 129/74)  BP(mean): --  RR: 18 (04-30-24 @ 11:21) (18 - 18)  SpO2: 100% (04-30-24 @ 11:21) (99% - 100%)    CVS: S1S2   Respiratory: No wheezing, no crepitations  GI: Abdomen soft, bowel sounds positive  Musculoskeletal:  moves all extremities  : Voiding

## 2024-04-30 NOTE — PROGRESS NOTE ADULT - ASSESSMENT
68 y/o male with hx of CAD s/p multiple  stents (most recent to pLM (70%), pLAD, mLAD on 1/8/24 with Dr. Dominguez), HFrEF , A-fib, HTN, HLD, T2DM,  CKD3, former smoker  Assessment  DMT2: 69y Male with DM T2 with hyperglycemia, A1C 7.6% , was on oral meds and insulin at home (Farxiga and Semglee basal insulin), now on basal  bolus insulins, sugars elevated.  was started on steroids, having steroid induced hyperglycemias  CAD: on medications, stable, monitored.   HTN: on antihypertensive medications, monitored, asymptomatic.  CKD: Monitor labs/BMP, renal follow up. kenia    Discussed plan and management with Attending   Jcarlos Jamil NP - TEAMS

## 2024-04-30 NOTE — PROGRESS NOTE ADULT - ASSESSMENT
#ASHD s/p multiple pci.   #A-fib  #HTN  #HL  #T2DM  #CKD   #Rhabdomyolysis:  Atorvastatin on hold.  Patient cannot get a statin again.  Would need to treat hypercholesterolemia with PCSK9.    Continue hydration.  On steroids. Awaits muscle biopsy;  As discussed with Joce Dominguez (who did recent pci) and Scott, and with patient, can HOLD eliquis for muscle biopsy, but must continue plavix without cessation.   Close follow up ck and troponin.

## 2024-04-30 NOTE — PROGRESS NOTE ADULT - PROBLEM SELECTOR PLAN 1
Will increase Lantus to 34 u at bedtime.  Will increase Admelog to 15 u before each meal and continue Admelog correction scale coverage. Will continue monitoring FS and Follow up.  DC on home Semglee   Stop home Farxiga for now due to recent BARRY/rhabdo  OP follow up

## 2024-04-30 NOTE — PROGRESS NOTE ADULT - SUBJECTIVE AND OBJECTIVE BOX
KRISTY COYLE  [id number]    INTERVAL HPI/OVERNIGHT EVENTS: Pt says he has developed worsened leg swelling. Muscle weakness remains unchanged. Denies dysphagia, but mentions that he always needs to drink liquids for solids to pass, but that he always has to do this for years and that it is not new. Denies fever, chills, chest pain, rash, joint pain.    MEDICATIONS  (STANDING):  chlorhexidine 2% Cloths 1 Application(s) Topical daily  clopidogrel Tablet 75 milliGRAM(s) Oral daily  dextrose 10% Bolus 125 milliLiter(s) IV Bolus once  dextrose 5%. 1000 milliLiter(s) (100 mL/Hr) IV Continuous <Continuous>  dextrose 5%. 1000 milliLiter(s) (50 mL/Hr) IV Continuous <Continuous>  dextrose 50% Injectable 12.5 Gram(s) IV Push once  dextrose 50% Injectable 25 Gram(s) IV Push once  glucagon  Injectable 1 milliGRAM(s) IntraMuscular once  influenza  Vaccine (HIGH DOSE) 0.7 milliLiter(s) IntraMuscular once  insulin glargine Injectable (LANTUS) 34 Unit(s) SubCutaneous at bedtime  insulin lispro (ADMELOG) corrective regimen sliding scale   SubCutaneous three times a day before meals  insulin lispro (ADMELOG) corrective regimen sliding scale   SubCutaneous at bedtime  insulin lispro Injectable (ADMELOG) 15 Unit(s) SubCutaneous three times a day before meals  isosorbide   dinitrate Tablet (ISORDIL) 30 milliGRAM(s) Oral three times a day  methylPREDNISolone sodium succinate Injectable 20 milliGRAM(s) IV Push two times a day  metoprolol tartrate 25 milliGRAM(s) Oral two times a day  polyethylene glycol 3350 17 Gram(s) Oral daily  senna 2 Tablet(s) Oral at bedtime  sodium bicarbonate  Infusion 0.127 mEq/kG/Hr (100 mL/Hr) IV Continuous <Continuous>    MEDICATIONS  (PRN):  acetaminophen     Tablet .. 650 milliGRAM(s) Oral every 6 hours PRN Temp greater or equal to 38C (100.4F), Mild Pain (1 - 3)  dextrose Oral Gel 15 Gram(s) Oral once PRN Blood Glucose LESS THAN 70 milliGRAM(s)/deciliter  nitroglycerin     SubLingual 0.4 milliGRAM(s) SubLingual every 5 minutes PRN Chest Pain      Allergies    atorvastatin (Muscle Pain (Severe))    Intolerances        Review of Systems: as above    Vital Signs Last 24 Hrs  T(C): 36.3 (30 Apr 2024 11:21), Max: 36.7 (29 Apr 2024 17:47)  T(F): 97.3 (30 Apr 2024 11:21), Max: 98 (29 Apr 2024 17:47)  HR: 80 (30 Apr 2024 11:21) (76 - 99)  BP: 129/74 (30 Apr 2024 11:21) (101/67 - 133/68)  BP(mean): --  RR: 18 (30 Apr 2024 11:21) (18 - 18)  SpO2: 100% (30 Apr 2024 11:21) (97% - 100%)    Parameters below as of 30 Apr 2024 11:21  Patient On (Oxygen Delivery Method): room air        Physical Exam:  General: Breathing comfortably on room air, no distress  HEENT: EOMI, MMM, no oral ulcers  CVS: +S1/S2, RRR  Resp: CTA b/l. No crackles/wheezing  GI: Soft, NT/ND +BS  MSK: no synovitis. (muscle strength performed in chair as pt does not want to lie in bed): 4/5 strength in b/l shoulders, 5/5 distals arms/hands, 2/5 b/l thighs, 5/5 legs/feet  Skin: no visible rashes. Pitting leg/ankle/foot edema b/l    LABS:                        10.3   25.19 )-----------( 458      ( 30 Apr 2024 06:07 )             32.2     04-30    135  |  102  |  73<H>  ----------------------------<  273<H>  4.7   |  21<L>  |  1.56<H>    Ca    8.3<L>      30 Apr 2024 06:07  Mg     2.2     04-30    TPro  4.9<L>  /  Alb  2.7<L>  /  TBili  0.6  /  DBili  x   /  AST  696<H>  /  ALT  764<H>  /  AlkPhos  124<H>  04-30      Urinalysis Basic - ( 30 Apr 2024 06:07 )    Color: x / Appearance: x / SG: x / pH: x  Gluc: 273 mg/dL / Ketone: x  / Bili: x / Urobili: x   Blood: x / Protein: x / Nitrite: x   Leuk Esterase: x / RBC: x / WBC x   Sq Epi: x / Non Sq Epi: x / Bacteria: x      < from: MR Pelvis No Cont (04.29.24 @ 10:16) >  ACC: [id number] EXAM:  MR PELVIS   ORDERED BY: PRABHAKAR BISHOP     PROCEDURE DATE:  04/29/2024          INTERPRETATION:  MRI OF THE BONY PELVIS    CLINICAL INFORMATION: For evaluation of myositis.    COMPARISON: None available.    TECHNIQUE: Multiplanar, multisequence imaging of the bony pelvis and   bilateral thighs.    FINDINGS:    OSSEOUS: Focal marrow edema like signal surrounds a small nondisplaced   subchondral insufficiency fracture along left peripheral weightbearing   lateral femoral condyle. No osteonecrosis or aggressive osseous   neoplasm., osteonecrosis, or aggressive neoplasm. Moderate left worse   than right hip arthrosis without effusions. Sacroiliac joints and pubic   symphysis are maintained    GENERAL: Heterogeneous but grossly symmetric intramuscular edema like   signal of bilateral hip girdle and thigh musculature, most prominent   within the left anterior gluteus medius medius near its proximal iliac   origin as well as at the distal myotendinous junction of the tensor   fascial lary near its junction with the iliotibial band and relatively   sparing the bilateral quadriceps compartments. Distal radial   peritendinitis with trace reactive fluid within bilateral subacromial and   greater trochanteric bursae, but without discrete tear or tracy bursitis.    Small to moderate left larger than right patellar knee joint effusions.   No discrete mass lesion, hematoma, distended Baker's popliteal cyst, or   drainable encapsulated fluid collection. Common hamstring origins and   iliopsoas insertions are intact. No pelvic free fluid.    IMPRESSION:    1.  Nonspecific, heterogeneous but grossly symmetric patchy myositis of   bilateral hip girdle and thigh musculature. Consider muscle biopsy of   left proximal anterior gluteus medius or ipsilateral distal tensor   fascial lary.  2.  Small subacute nondisplaced subchondral insufficiency fracture of   left lateral femoral condyle.    --- End of Report ---            BETSY PETERSON MD; Attending Radiologist  This document has been electronically signed. Apr 29 2024 11:02AM    < end of copied text >    < from: CT Chest No Cont (04.29.24 @ 09:44) >    ACC: [id number] EXAM:  CT ABDOMEN AND PELVIS   ORDERED BY: PRABHAKAR BISHOP     ACC: [id number] EXAM:  CT CHEST   ORDERED BY: PRABHAKAR BISHOP     PROCEDURE DATE:  04/29/2024          INTERPRETATION:  CLINICAL INFORMATION: Elevated CK. Concern for   inflammatory myositis. Evaluate for malignancy.    COMPARISON: CT chest, abdomen, and pelvis 5/27/2023.    CONTRAST/COMPLICATIONS:  IV Contrast: NONE  Oral Contrast: NONE  Complications: None reported at time of study completion    PROCEDURE:  CT of the Chest, Abdomen and Pelvis was performed.  Sagittal and coronal reformats were performed.    FINDINGS:  Limited evaluation of the vasculature and viscera in the absence of   intravenous contrast.    CHEST:  LUNGS AND LARGE AIRWAYS: Patent central airways.Mild bilateral   subsegmental atelectasis. A 2 mm nodule in the lingula is unchanged   (series 4 image 283).  PLEURA: No pleural effusion.  VESSELS: Atherosclerotic changes. Coronary artery calcifications and   possibly stenting.  HEART: Heart size is normal. No pericardial effusion.  MEDIASTINUM AND JESUS: Small nonspecific mediastinal lymph nodes are   overall similar to the previous exam. For example, a right paratracheal   node measures 1.6 x 1.1 cm (series 3 image 52).  CHEST WALL AND LOWER NECK: Mild generalized subcutaneous edema.   Intramuscular lipoma in the the right latissimus dorsi measuring 1.3 x   0.8 cm.    ABDOMEN AND PELVIS:  LIVER: Within normal limits.  BILE DUCTS: Normal caliber.  GALLBLADDER: Within normal limits.  SPLEEN:Within normal limits.  PANCREAS: Within normal limits.  ADRENALS: Within normal limits.  KIDNEYS/URETERS: Within normal limits.    BLADDER: Within normal limits.  REPRODUCTIVE ORGANS: Mildly enlarged prostate.    BOWEL: No bowel obstruction.  PERITONEUM: Trace ascites. Mesenteric edema.  VESSELS: Atherosclerotic changes.  RETROPERITONEUM/LYMPH NODES: Small nonspecific lymph nodes in the central   mesentery are similar to prior. For example, a mesenteric node in the   left hemiabdomen measures 1.3x 0.9 cm (series 3 image 153). Presacral   edema.  ABDOMINAL WALL: Generalized subcutaneous edema. Small bilateral   fat-containing inguinal hernias.  BONES: Degenerative changes.    IMPRESSION:  *  The exam may be limited by noncontrast technique.  *  No evidence of malignancy within the chest, abdomen, or pelvis.  *  Generalized subcutaneous edema.  *  Trace ascites and mesenteric edema.        --- End of Report ---            AVELINO AJ MD; Attending Radiologist  This document has been electronically signed. Apr 29 2024 11:00AM    Creatine Kinase, Serum in AM (04.30.24 @ 06:07)   Creatine Kinase, Serum: 85735 U/L      Historical Values  Creatine Kinase, Serum in AM (04.30.24 @ 06:07)   Creatine Kinase, Serum: 91869 U/L  Creatine Kinase, Serum in AM (04.29.24 @ 06:14)   Creatine Kinase, Serum: 31886 U/L  Creatine Kinase, Serum in AM (04.28.24 @ 06:19)   Creatine Kinase, Serum: 12604 U/L  Creatine Kinase, Serum in AM (04.27.24 @ 07:11)   Creatine Kinase, Serum: 04729 U/L  Creatine Kinase, Serum in AM (04.26.24 @ 06:25)   Creatine Kinase, Serum: 16741 U/L  Creatine Kinase, Serum in AM (09.07.19 @ 07:03)   Creatine Kinase, Serum: 164 U/L       KRISTY COYLE  [id number]    INTERVAL HPI/OVERNIGHT EVENTS: Pt says he has developed worsened leg swelling. Muscle weakness remains unchanged. Denies dysphagia, but mentions that he always needs to drink liquids for solids to pass, but that he always has to do this for years and that it is not new. Denies fever, chills, chest pain, rash, joint pain.    MEDICATIONS  (STANDING):  chlorhexidine 2% Cloths 1 Application(s) Topical daily  clopidogrel Tablet 75 milliGRAM(s) Oral daily  dextrose 10% Bolus 125 milliLiter(s) IV Bolus once  dextrose 5%. 1000 milliLiter(s) (100 mL/Hr) IV Continuous <Continuous>  dextrose 5%. 1000 milliLiter(s) (50 mL/Hr) IV Continuous <Continuous>  dextrose 50% Injectable 12.5 Gram(s) IV Push once  dextrose 50% Injectable 25 Gram(s) IV Push once  glucagon  Injectable 1 milliGRAM(s) IntraMuscular once  influenza  Vaccine (HIGH DOSE) 0.7 milliLiter(s) IntraMuscular once  insulin glargine Injectable (LANTUS) 34 Unit(s) SubCutaneous at bedtime  insulin lispro (ADMELOG) corrective regimen sliding scale   SubCutaneous three times a day before meals  insulin lispro (ADMELOG) corrective regimen sliding scale   SubCutaneous at bedtime  insulin lispro Injectable (ADMELOG) 15 Unit(s) SubCutaneous three times a day before meals  isosorbide   dinitrate Tablet (ISORDIL) 30 milliGRAM(s) Oral three times a day  methylPREDNISolone sodium succinate Injectable 20 milliGRAM(s) IV Push two times a day  metoprolol tartrate 25 milliGRAM(s) Oral two times a day  polyethylene glycol 3350 17 Gram(s) Oral daily  senna 2 Tablet(s) Oral at bedtime  sodium bicarbonate  Infusion 0.127 mEq/kG/Hr (100 mL/Hr) IV Continuous <Continuous>    MEDICATIONS  (PRN):  acetaminophen     Tablet .. 650 milliGRAM(s) Oral every 6 hours PRN Temp greater or equal to 38C (100.4F), Mild Pain (1 - 3)  dextrose Oral Gel 15 Gram(s) Oral once PRN Blood Glucose LESS THAN 70 milliGRAM(s)/deciliter  nitroglycerin     SubLingual 0.4 milliGRAM(s) SubLingual every 5 minutes PRN Chest Pain      Allergies    atorvastatin (Muscle Pain (Severe))    Intolerances        Review of Systems: as above    Vital Signs Last 24 Hrs  T(C): 36.3 (30 Apr 2024 11:21), Max: 36.7 (29 Apr 2024 17:47)  T(F): 97.3 (30 Apr 2024 11:21), Max: 98 (29 Apr 2024 17:47)  HR: 80 (30 Apr 2024 11:21) (76 - 99)  BP: 129/74 (30 Apr 2024 11:21) (101/67 - 133/68)  BP(mean): --  RR: 18 (30 Apr 2024 11:21) (18 - 18)  SpO2: 100% (30 Apr 2024 11:21) (97% - 100%)    Parameters below as of 30 Apr 2024 11:21  Patient On (Oxygen Delivery Method): room air        Physical Exam:  General: Breathing comfortably on room air, no distress  HEENT: EOMI, MMM, no oral ulcers  CVS: +S1/S2  Resp: CTA b/l  GI: Soft, NT/ND +BS  MSK: no synovitis. (muscle strength performed in chair as pt does not want to lie in bed): 4/5 strength in b/l shoulders, 5/5 distals arms/hands, 2/5 b/l thighs, 5/5 legs/feet  Skin: no visible rashes. Pitting leg/ankle/foot edema b/l    LABS:                        10.3   25.19 )-----------( 458      ( 30 Apr 2024 06:07 )             32.2     04-30    135  |  102  |  73<H>  ----------------------------<  273<H>  4.7   |  21<L>  |  1.56<H>    Ca    8.3<L>      30 Apr 2024 06:07  Mg     2.2     04-30    TPro  4.9<L>  /  Alb  2.7<L>  /  TBili  0.6  /  DBili  x   /  AST  696<H>  /  ALT  764<H>  /  AlkPhos  124<H>  04-30      Urinalysis Basic - ( 30 Apr 2024 06:07 )    Color: x / Appearance: x / SG: x / pH: x  Gluc: 273 mg/dL / Ketone: x  / Bili: x / Urobili: x   Blood: x / Protein: x / Nitrite: x   Leuk Esterase: x / RBC: x / WBC x   Sq Epi: x / Non Sq Epi: x / Bacteria: x      < from: MR Pelvis No Cont (04.29.24 @ 10:16) >  ACC: [id number] EXAM:  MR PELVIS   ORDERED BY: PRABHAKAR BISHOP     PROCEDURE DATE:  04/29/2024          INTERPRETATION:  MRI OF THE BONY PELVIS    CLINICAL INFORMATION: For evaluation of myositis.    COMPARISON: None available.    TECHNIQUE: Multiplanar, multisequence imaging of the bony pelvis and   bilateral thighs.    FINDINGS:    OSSEOUS: Focal marrow edema like signal surrounds a small nondisplaced   subchondral insufficiency fracture along left peripheral weightbearing   lateral femoral condyle. No osteonecrosis or aggressive osseous   neoplasm., osteonecrosis, or aggressive neoplasm. Moderate left worse   than right hip arthrosis without effusions. Sacroiliac joints and pubic   symphysis are maintained    GENERAL: Heterogeneous but grossly symmetric intramuscular edema like   signal of bilateral hip girdle and thigh musculature, most prominent   within the left anterior gluteus medius medius near its proximal iliac   origin as well as at the distal myotendinous junction of the tensor   fascial lary near its junction with the iliotibial band and relatively   sparing the bilateral quadriceps compartments. Distal radial   peritendinitis with trace reactive fluid within bilateral subacromial and   greater trochanteric bursae, but without discrete tear or tracy bursitis.    Small to moderate left larger than right patellar knee joint effusions.   No discrete mass lesion, hematoma, distended Baker's popliteal cyst, or   drainable encapsulated fluid collection. Common hamstring origins and   iliopsoas insertions are intact. No pelvic free fluid.    IMPRESSION:    1.  Nonspecific, heterogeneous but grossly symmetric patchy myositis of   bilateral hip girdle and thigh musculature. Consider muscle biopsy of   left proximal anterior gluteus medius or ipsilateral distal tensor   fascial lary.  2.  Small subacute nondisplaced subchondral insufficiency fracture of   left lateral femoral condyle.    --- End of Report ---            BETSY PETERSON MD; Attending Radiologist  This document has been electronically signed. Apr 29 2024 11:02AM    < end of copied text >    < from: CT Chest No Cont (04.29.24 @ 09:44) >    ACC: [id number] EXAM:  CT ABDOMEN AND PELVIS   ORDERED BY: PRABHAKAR BISHOP     ACC: [id number] EXAM:  CT CHEST   ORDERED BY: PRABHAKAR BISHOP     PROCEDURE DATE:  04/29/2024          INTERPRETATION:  CLINICAL INFORMATION: Elevated CK. Concern for   inflammatory myositis. Evaluate for malignancy.    COMPARISON: CT chest, abdomen, and pelvis 5/27/2023.    CONTRAST/COMPLICATIONS:  IV Contrast: NONE  Oral Contrast: NONE  Complications: None reported at time of study completion    PROCEDURE:  CT of the Chest, Abdomen and Pelvis was performed.  Sagittal and coronal reformats were performed.    FINDINGS:  Limited evaluation of the vasculature and viscera in the absence of   intravenous contrast.    CHEST:  LUNGS AND LARGE AIRWAYS: Patent central airways.Mild bilateral   subsegmental atelectasis. A 2 mm nodule in the lingula is unchanged   (series 4 image 283).  PLEURA: No pleural effusion.  VESSELS: Atherosclerotic changes. Coronary artery calcifications and   possibly stenting.  HEART: Heart size is normal. No pericardial effusion.  MEDIASTINUM AND JESUS: Small nonspecific mediastinal lymph nodes are   overall similar to the previous exam. For example, a right paratracheal   node measures 1.6 x 1.1 cm (series 3 image 52).  CHEST WALL AND LOWER NECK: Mild generalized subcutaneous edema.   Intramuscular lipoma in the the right latissimus dorsi measuring 1.3 x   0.8 cm.    ABDOMEN AND PELVIS:  LIVER: Within normal limits.  BILE DUCTS: Normal caliber.  GALLBLADDER: Within normal limits.  SPLEEN:Within normal limits.  PANCREAS: Within normal limits.  ADRENALS: Within normal limits.  KIDNEYS/URETERS: Within normal limits.    BLADDER: Within normal limits.  REPRODUCTIVE ORGANS: Mildly enlarged prostate.    BOWEL: No bowel obstruction.  PERITONEUM: Trace ascites. Mesenteric edema.  VESSELS: Atherosclerotic changes.  RETROPERITONEUM/LYMPH NODES: Small nonspecific lymph nodes in the central   mesentery are similar to prior. For example, a mesenteric node in the   left hemiabdomen measures 1.3x 0.9 cm (series 3 image 153). Presacral   edema.  ABDOMINAL WALL: Generalized subcutaneous edema. Small bilateral   fat-containing inguinal hernias.  BONES: Degenerative changes.    IMPRESSION:  *  The exam may be limited by noncontrast technique.  *  No evidence of malignancy within the chest, abdomen, or pelvis.  *  Generalized subcutaneous edema.  *  Trace ascites and mesenteric edema.        --- End of Report ---            AVELINO AJ MD; Attending Radiologist  This document has been electronically signed. Apr 29 2024 11:00AM    Creatine Kinase, Serum in AM (04.30.24 @ 06:07)   Creatine Kinase, Serum: 39950 U/L      Historical Values  Creatine Kinase, Serum in AM (04.30.24 @ 06:07)   Creatine Kinase, Serum: 62377 U/L  Creatine Kinase, Serum in AM (04.29.24 @ 06:14)   Creatine Kinase, Serum: 84802 U/L  Creatine Kinase, Serum in AM (04.28.24 @ 06:19)   Creatine Kinase, Serum: 98080 U/L  Creatine Kinase, Serum in AM (04.27.24 @ 07:11)   Creatine Kinase, Serum: 05959 U/L  Creatine Kinase, Serum in AM (04.26.24 @ 06:25)   Creatine Kinase, Serum: 45927 U/L  Creatine Kinase, Serum in AM (09.07.19 @ 07:03)   Creatine Kinase, Serum: 164 U/L

## 2024-05-01 ENCOUNTER — TRANSCRIPTION ENCOUNTER (OUTPATIENT)
Age: 69
End: 2024-05-01

## 2024-05-01 LAB
% ALBUMIN: 47.5 % — SIGNIFICANT CHANGE UP
% ALPHA 1: 8.6 % — SIGNIFICANT CHANGE UP
% ALPHA 2: 18.4 % — SIGNIFICANT CHANGE UP
% BETA: 14.7 % — SIGNIFICANT CHANGE UP
% GAMMA: 10.8 % — SIGNIFICANT CHANGE UP
ALBUMIN SERPL ELPH-MCNC: 2.7 G/DL — LOW (ref 3.3–5)
ALBUMIN SERPL ELPH-MCNC: 2.7 G/DL — LOW (ref 3.6–5.5)
ALBUMIN/GLOB SERPL ELPH: 0.9 RATIO — SIGNIFICANT CHANGE UP
ALP SERPL-CCNC: 118 U/L — SIGNIFICANT CHANGE UP (ref 40–120)
ALPHA1 GLOB SERPL ELPH-MCNC: 0.5 G/DL — HIGH (ref 0.1–0.4)
ALPHA2 GLOB SERPL ELPH-MCNC: 1 G/DL — SIGNIFICANT CHANGE UP (ref 0.5–1)
ALT FLD-CCNC: 730 U/L — HIGH (ref 10–45)
ANION GAP SERPL CALC-SCNC: 12 MMOL/L — SIGNIFICANT CHANGE UP (ref 5–17)
AST SERPL-CCNC: 597 U/L — HIGH (ref 10–40)
B-GLOBULIN SERPL ELPH-MCNC: 0.8 G/DL — SIGNIFICANT CHANGE UP (ref 0.5–1)
BILIRUB SERPL-MCNC: 0.7 MG/DL — SIGNIFICANT CHANGE UP (ref 0.2–1.2)
BUN SERPL-MCNC: 70 MG/DL — HIGH (ref 7–23)
CALCIUM SERPL-MCNC: 9.2 MG/DL — SIGNIFICANT CHANGE UP (ref 8.4–10.5)
CHLORIDE SERPL-SCNC: 103 MMOL/L — SIGNIFICANT CHANGE UP (ref 96–108)
CK SERPL-CCNC: 7488 U/L — HIGH (ref 30–200)
CO2 SERPL-SCNC: 22 MMOL/L — SIGNIFICANT CHANGE UP (ref 22–31)
CREAT SERPL-MCNC: 1.38 MG/DL — HIGH (ref 0.5–1.3)
CREATININE, URINE RESULT: 26 MG/DL — SIGNIFICANT CHANGE UP
EGFR: 56 ML/MIN/1.73M2 — LOW
GAMMA GLOBULIN: 0.6 G/DL — SIGNIFICANT CHANGE UP (ref 0.6–1.6)
GLUCOSE BLDC GLUCOMTR-MCNC: 183 MG/DL — HIGH (ref 70–99)
GLUCOSE BLDC GLUCOMTR-MCNC: 186 MG/DL — HIGH (ref 70–99)
GLUCOSE BLDC GLUCOMTR-MCNC: 193 MG/DL — HIGH (ref 70–99)
GLUCOSE BLDC GLUCOMTR-MCNC: 245 MG/DL — HIGH (ref 70–99)
GLUCOSE BLDC GLUCOMTR-MCNC: 267 MG/DL — HIGH (ref 70–99)
GLUCOSE SERPL-MCNC: 178 MG/DL — HIGH (ref 70–99)
HCT VFR BLD CALC: 34.2 % — LOW (ref 39–50)
HGB BLD-MCNC: 10.9 G/DL — LOW (ref 13–17)
INTERPRETATION SERPL IFE-IMP: SIGNIFICANT CHANGE UP
MCHC RBC-ENTMCNC: 27.9 PG — SIGNIFICANT CHANGE UP (ref 27–34)
MCHC RBC-ENTMCNC: 31.9 GM/DL — LOW (ref 32–36)
MCV RBC AUTO: 87.5 FL — SIGNIFICANT CHANGE UP (ref 80–100)
NRBC # BLD: 0 /100 WBCS — SIGNIFICANT CHANGE UP (ref 0–0)
PLATELET # BLD AUTO: 502 K/UL — HIGH (ref 150–400)
POTASSIUM SERPL-MCNC: 4.5 MMOL/L — SIGNIFICANT CHANGE UP (ref 3.5–5.3)
POTASSIUM SERPL-SCNC: 4.5 MMOL/L — SIGNIFICANT CHANGE UP (ref 3.5–5.3)
PROT ?TM UR-MCNC: 14 MG/DL — HIGH (ref 0–12)
PROT PATTERN SERPL ELPH-IMP: SIGNIFICANT CHANGE UP
PROT SERPL-MCNC: 5.1 G/DL — LOW (ref 6–8.3)
PROT SERPL-MCNC: 5.6 G/DL — LOW (ref 6–8.3)
RBC # BLD: 3.91 M/UL — LOW (ref 4.2–5.8)
RBC # FLD: 16.9 % — HIGH (ref 10.3–14.5)
SODIUM SERPL-SCNC: 137 MMOL/L — SIGNIFICANT CHANGE UP (ref 135–145)
WBC # BLD: 31.72 K/UL — HIGH (ref 3.8–10.5)
WBC # FLD AUTO: 31.72 K/UL — HIGH (ref 3.8–10.5)

## 2024-05-01 PROCEDURE — 71045 X-RAY EXAM CHEST 1 VIEW: CPT | Mod: 26

## 2024-05-01 RX ORDER — BUMETANIDE 0.25 MG/ML
0.5 INJECTION INTRAMUSCULAR; INTRAVENOUS ONCE
Refills: 0 | Status: COMPLETED | OUTPATIENT
Start: 2024-05-01 | End: 2024-05-01

## 2024-05-01 RX ORDER — FUROSEMIDE 40 MG
40 TABLET ORAL ONCE
Refills: 0 | Status: COMPLETED | OUTPATIENT
Start: 2024-05-01 | End: 2024-05-01

## 2024-05-01 RX ADMIN — ISOSORBIDE DINITRATE 30 MILLIGRAM(S): 5 TABLET ORAL at 11:16

## 2024-05-01 RX ADMIN — Medication 15 UNIT(S): at 17:12

## 2024-05-01 RX ADMIN — SODIUM CHLORIDE 50 MILLILITER(S): 9 INJECTION, SOLUTION INTRAVENOUS at 09:01

## 2024-05-01 RX ADMIN — Medication 40 MILLIGRAM(S): at 11:01

## 2024-05-01 RX ADMIN — Medication 20 MILLIGRAM(S): at 05:52

## 2024-05-01 RX ADMIN — BUMETANIDE 0.5 MILLIGRAM(S): 0.25 INJECTION INTRAMUSCULAR; INTRAVENOUS at 22:11

## 2024-05-01 RX ADMIN — Medication 3: at 12:46

## 2024-05-01 RX ADMIN — Medication 1: at 17:13

## 2024-05-01 RX ADMIN — ISOSORBIDE DINITRATE 30 MILLIGRAM(S): 5 TABLET ORAL at 05:50

## 2024-05-01 RX ADMIN — CLOPIDOGREL BISULFATE 75 MILLIGRAM(S): 75 TABLET, FILM COATED ORAL at 11:16

## 2024-05-01 RX ADMIN — BUMETANIDE 0.5 MILLIGRAM(S): 0.25 INJECTION INTRAMUSCULAR; INTRAVENOUS at 17:27

## 2024-05-01 RX ADMIN — Medication 20 MILLIGRAM(S): at 17:22

## 2024-05-01 RX ADMIN — Medication 15 UNIT(S): at 08:22

## 2024-05-01 RX ADMIN — Medication 1 APPLICATION(S): at 11:38

## 2024-05-01 RX ADMIN — Medication 15 UNIT(S): at 12:46

## 2024-05-01 RX ADMIN — CHLORHEXIDINE GLUCONATE 1 APPLICATION(S): 213 SOLUTION TOPICAL at 11:04

## 2024-05-01 RX ADMIN — Medication 25 MILLIGRAM(S): at 05:50

## 2024-05-01 RX ADMIN — Medication 1: at 08:23

## 2024-05-01 RX ADMIN — ISOSORBIDE DINITRATE 30 MILLIGRAM(S): 5 TABLET ORAL at 17:21

## 2024-05-01 RX ADMIN — Medication 40 MILLIGRAM(S): at 05:49

## 2024-05-01 RX ADMIN — INSULIN GLARGINE 34 UNIT(S): 100 INJECTION, SOLUTION SUBCUTANEOUS at 22:13

## 2024-05-01 RX ADMIN — Medication 25 MILLIGRAM(S): at 17:21

## 2024-05-01 RX ADMIN — SODIUM CHLORIDE 50 MILLILITER(S): 9 INJECTION, SOLUTION INTRAVENOUS at 05:56

## 2024-05-01 NOTE — PROGRESS NOTE ADULT - SUBJECTIVE AND OBJECTIVE BOX
date of service: 05-01-24 @ 09:52  afebirle  REVIEW OF SYSTEMS:  CONSTITUTIONAL: No fever,  no  weight loss  ENT:  No  tinnitus,   no   vertigo  NECK: No pain or stiffness  RESPIRATORY: No cough, wheezing, chills or hemoptysis;    No Shortness of Breath  CARDIOVASCULAR: No chest pain, palpitations, dizziness  GASTROINTESTINAL: No abdominal or epigastric pain. No nausea, vomiting, or hematemesis; No diarrhea  No melena or hematochezia.  GENITOURINARY: No dysuria, frequency, hematuria, or incontinence  NEUROLOGICAL: No headaches  SKIN: No itching,  no   rash  LYMPH Nodes: No enlarged glands  ENDOCRINE: No heat or cold intolerance  MUSCULOSKELETAL: No joint pain or swelling  PSYCHIATRIC: No depression, anxiety  HEME/LYMPH: No easy bruising, or bleeding gums  ALLERGY AND IMMUNOLOGIC: No hives or eczema	    MEDICATIONS  (STANDING):  chlorhexidine 2% Cloths 1 Application(s) Topical daily  clopidogrel Tablet 75 milliGRAM(s) Oral daily  dextrose 10% Bolus 125 milliLiter(s) IV Bolus once  dextrose 5%. 1000 milliLiter(s) (50 mL/Hr) IV Continuous <Continuous>  dextrose 5%. 1000 milliLiter(s) (100 mL/Hr) IV Continuous <Continuous>  dextrose 50% Injectable 12.5 Gram(s) IV Push once  dextrose 50% Injectable 25 Gram(s) IV Push once  furosemide   Injectable 40 milliGRAM(s) IV Push daily  glucagon  Injectable 1 milliGRAM(s) IntraMuscular once  influenza  Vaccine (HIGH DOSE) 0.7 milliLiter(s) IntraMuscular once  insulin glargine Injectable (LANTUS) 34 Unit(s) SubCutaneous at bedtime  insulin lispro (ADMELOG) corrective regimen sliding scale   SubCutaneous three times a day before meals  insulin lispro (ADMELOG) corrective regimen sliding scale   SubCutaneous at bedtime  insulin lispro Injectable (ADMELOG) 15 Unit(s) SubCutaneous three times a day before meals  isosorbide   dinitrate Tablet (ISORDIL) 30 milliGRAM(s) Oral three times a day  methylPREDNISolone sodium succinate Injectable 20 milliGRAM(s) IV Push two times a day  metoprolol tartrate 25 milliGRAM(s) Oral two times a day  polyethylene glycol 3350 17 Gram(s) Oral daily  senna 2 Tablet(s) Oral at bedtime    MEDICATIONS  (PRN):  acetaminophen     Tablet .. 650 milliGRAM(s) Oral every 6 hours PRN Temp greater or equal to 38C (100.4F), Mild Pain (1 - 3)  dextrose Oral Gel 15 Gram(s) Oral once PRN Blood Glucose LESS THAN 70 milliGRAM(s)/deciliter  nitroglycerin     SubLingual 0.4 milliGRAM(s) SubLingual every 5 minutes PRN Chest Pain      Vital Signs Last 24 Hrs  T(C): 36.4 (01 May 2024 04:59), Max: 36.4 (01 May 2024 04:59)  T(F): 97.5 (01 May 2024 04:59), Max: 97.5 (01 May 2024 04:59)  HR: 73 (01 May 2024 04:59) (73 - 80)  BP: 116/62 (01 May 2024 04:59) (116/62 - 129/74)  BP(mean): --  RR: 18 (01 May 2024 04:59) (18 - 18)  SpO2: 97% (01 May 2024 04:59) (97% - 100%)    Parameters below as of 01 May 2024 04:59  Patient On (Oxygen Delivery Method): nasal cannula      CAPILLARY BLOOD GLUCOSE      POCT Blood Glucose.: 193 mg/dL (01 May 2024 07:53)  POCT Blood Glucose.: 192 mg/dL (30 Apr 2024 21:25)  POCT Blood Glucose.: 267 mg/dL (30 Apr 2024 16:47)  POCT Blood Glucose.: 314 mg/dL (30 Apr 2024 12:24)    I&O's Summary    30 Apr 2024 07:01  -  01 May 2024 07:00  --------------------------------------------------------  IN: 480 mL / OUT: 2050 mL / NET: -1570 mL    01 May 2024 07:01  -  01 May 2024 09:52  --------------------------------------------------------  IN: 0 mL / OUT: 900 mL / NET: -900 mL          Appearance: Normal	  HEENT:   Normal oral mucosa, PERRL, EOMI	  Lymphatic: No lymphadenopathy  Cardiovascular: Normal S1 S2, No JVD  Respiratory: Lungs clear to auscultation	  Gastrointestinal:  Soft, Non-tender, + BS	  Skin: No rash, No ecchymoses	  Extremities:     LABS:                        10.9   31.72 )-----------( 502      ( 01 May 2024 08:25 )             34.2     05-01    137  |  103  |  70<H>  ----------------------------<  178<H>  4.5   |  22  |  1.38<H>    Ca    9.2      01 May 2024 08:25  Mg     2.2     04-30    TPro  5.1<L>  /  Alb  2.7<L>  /  TBili  0.7  /  DBili  x   /  AST  597<H>  /  ALT  730<H>  /  AlkPhos  118  05-01      CARDIAC MARKERS ( 01 May 2024 08:25 )  x     / x     / 7488 U/L / x     / x      CARDIAC MARKERS ( 30 Apr 2024 06:07 )  x     / x     / 25720 U/L / x     / x          Urinalysis Basic - ( 01 May 2024 08:25 )    Color: x / Appearance: x / SG: x / pH: x  Gluc: 178 mg/dL / Ketone: x  / Bili: x / Urobili: x   Blood: x / Protein: x / Nitrite: x   Leuk Esterase: x / RBC: x / WBC x   Sq Epi: x / Non Sq Epi: x / Bacteria: x              Thyroid Stimulating Hormone, Serum: 1.76 uIU/mL (02-10 @ 07:24)          Consultant(s) Notes Reviewed:      Care Discussed with Consultants/Other Providers:

## 2024-05-01 NOTE — PROGRESS NOTE ADULT - ASSESSMENT
Assessment:   Jean Claude Velázquez is a 69 y.o. man with history of HTN, HLD, DM2, CAD c/b NSTEMI (4/16) s/p 10 stents (last 1 week prior to admission, Plavix), Afib on Eliquis, CHF, and CKD who is currently admitted for evaluation of rhabdomyolysis. Surgery consultated for muscle biopsy.       PLAN:  - Plan for Rt Hamstring muscle biopsy tomorrow, pending consent   - Please continue to hold Eliquis  - Please get Pre-op am labs   - NPO after midnight      ACS/Trauma  x [phone]

## 2024-05-01 NOTE — SWALLOW BEDSIDE ASSESSMENT ADULT - PHARYNGEAL PHASE
No signs or symptoms of laryngeal penetration/aspiration evident with any consistency presented.  Pharyngeal swallow judged to be timely and laryngeal elevation was palpated.

## 2024-05-01 NOTE — PROGRESS NOTE ADULT - PROBLEM SELECTOR PLAN 1
Will increase Lantus to 34 u at bedtime.  Will increase Admelog to 15 u before each meal and continue Admelog correction scale coverage. Will continue monitoring FS and Follow up.  DC on home Semglee   Stop home Farxiga for now due to recent BARRY/rhabdo  OP follow up Will increase Lantus to 34 u at bedtime.  Will increase Admelog to 15 u before each meal and continue Admelog correction scale coverage. Will continue monitoring FS and Follow up.  DC on home Semglee   Stop home Farxiga altogther and any SGLT2i due to recent BARRY/rhabdo  OP follow up

## 2024-05-01 NOTE — PROGRESS NOTE ADULT - ASSESSMENT
68 y/o man      h/o severe CAD S/P multiple PCI's including multiple LM and LAD stents.       +A-fib, HTN, HLD, T2DM, CKD3, former smoker     presents with  weaknss/   leg  pains.  no leg  weakness      + troponin /  elevated  cpk      CAD,  has   10  stents/  HTN/  HLD        cath,   4/18,  2.0 mm caliber TITO to distal LAD,Dr ILDA Dominguez        on  dapt/  ef   47     troponin,  likely   demand  ischemia.  not  mi  per  dr arellano,,  not a  candidate   for  further intervention,   small  caliber  vessels   c/c  Afib,  on eliquis      DM,  follow  fs/  endo  dr durant   CKD    rhabdomyolysis,/  elevated  CPK,    suspect  ?  statin induced.   and   statin  on hold    elevated lfts,   abd  u/s,  normal liver    doppler  legs.  with PAD/  will  f/p  a s an out pt with vascular. when stable   on asa/  eliquis, plavix, / lasix, imdur.  valsartan.  toprol      hold   Farxiga   and  valsartan for  now,  iv fluids,  per  renal   c/c  anemia     elevated  wbc.  afebrile,  doubt  infectious. and  no ab per  ID     follow   lfts/ cpk,   nee d to  r/o  polymyositis, vs  drug induced   myositis,    mri  pelvis,  myositis/  fx  femoral condyle.  ortho  eval  noted,  rec immobilizer  pt refusing    ct a/p, neck, no  malignancy   await   bx   by   surg dr adam palomino/  on iv  steroid  per   rheum       recent   stent/on  4/17/24.   only   option , to   temporarily hold  eliquis  and   continue   plavix,      and   card  dr bravo and  dr brijesh dominguez in  agreement    discussed  with  surg  dr adam palomino,   bx  on  wed.  and   eliquis , o hold     awaiting   muscle  bx /   wbc  noted, from steroid,  cpk/  lfts decreasing        rad< from: TTE W or WO Ultrasound Enhancing Agent (01.29.24 @ 18:15) >  ONCLUSIONS:   1. Left ventricular cavity is small. Left ventricular wall thickness is normal. Left ventricular systolic function is normal with an ejection fraction of 47 % by Ulloa's method of disks.   2. Unable to evaluate wall motion due to poor image quality. There is possible apical hypokinesis.   3. There is severe (grade 3) left ventricular diastolic dysfunction, with elevated filling pressure.   4. Normal right ventricular cavity size and normalsystolic function.   5. There is mild tricuspid regurgitation. Estimated pulmonary artery systolic pressure is 23 mmHg.   6. Compared to the transthoracic echocardiogram performed on 11/26/2023, there have been no significant interval changes.  ____________________________________________________________________  Recommendations:  If clincially indicated, patient should return for repeat echocardiogram with contrast agent, for enhanced left ventricular opacification and improved delineation of the left ventricular endocardial borders. Would suggest a limited study with UEA/ contrast for better delineation of wall motion abnormalities.  ________________________________________________________________________________________  FINDINGS:  < end of copied text >         rad    -  70 y/o man      h/o severe CAD S/P multiple PCI's including multiple LM and LAD stents.       +A-fib, HTN, HLD, T2DM, CKD3, former smoker     presents with  weaknss/   leg  pains.  no leg  weakness      + troponin /  elevated  cpk      CAD,  has   10  stents/  HTN/  HLD        cath,   4/18,  2.0 mm caliber TITO to distal LAD,Dr ILDA Dominguez        on  dapt/  ef   47     troponin,  likely   demand  ischemia.  not  mi  per  dr arellano,,  not a  candidate   for  further intervention,   small  caliber  vessels   c/c  Afib,  on eliquis      DM,  follow  fs/  endo  dr durant   CKD    rhabdomyolysis,/  elevated  CPK,    suspect  ?  statin induced.   and   statin  on hold    elevated lfts,   abd  u/s,  normal liver    doppler  legs.  with PAD/  will  f/p  a s an out pt with vascular. when stable   on asa/  eliquis, plavix, / lasix, imdur.  valsartan.  toprol      hold   Farxiga   and  valsartan for  now,  iv fluids,  per  renal   c/c  anemia     elevated  wbc.  afebrile,  doubt  infectious. and  no ab per  ID     follow   lfts/ cpk,   nee d to  r/o  polymyositis, vs  drug induced   myositis,    mri  pelvis,  myositis/  fx  femoral condyle.  ortho  eval  noted,  rec immobilizer  pt refusing    ct a/p, neck, no  malignancy   await   bx   by   surg dr adam palomino/  on iv  steroid  per   rheum       recent   stent/on  4/17/24.   only   option , to   temporarily hold  eliquis  and   continue   plavix,      and   card  dr bravo and  dr brijesh dominguez in  agreement    discussed  with  surg  dr adam palomino,   bx  on  wed.  and   eliquis , no hold     awaiting   muscle  bx /   wbc  noted, from steroid,  cpk/  lfts decreasing     pedal  edeam/  blister,  dorsum  foot/ on iv lasix/  suspect  from iv fluids.  which wa s  stopped       rad< from: TTE W or WO Ultrasound Enhancing Agent (01.29.24 @ 18:15) >  ONCLUSIONS:   1. Left ventricular cavity is small. Left ventricular wall thickness is normal. Left ventricular systolic function is normal with an ejection fraction of 47 % by Ulloa's method of disks.   2. Unable to evaluate wall motion due to poor image quality. There is possible apical hypokinesis.   3. There is severe (grade 3) left ventricular diastolic dysfunction, with elevated filling pressure.   4. Normal right ventricular cavity size and normalsystolic function.   5. There is mild tricuspid regurgitation. Estimated pulmonary artery systolic pressure is 23 mmHg.   6. Compared to the transthoracic echocardiogram performed on 11/26/2023, there have been no significant interval changes.  ____________________________________________________________________  Recommendations:  If clincially indicated, patient should return for repeat echocardiogram with contrast agent, for enhanced left ventricular opacification and improved delineation of the left ventricular endocardial borders. Would suggest a limited study with UEA/ contrast for better delineation of wall motion abnormalities.  ________________________________________________________________________________________  FINDINGS:  < end of copied text >         rad    -  70 y/o man      h/o severe CAD S/P multiple PCI's including multiple LM and LAD stents.       +A-fib, HTN, HLD, T2DM, CKD3, former smoker     presents with  weaknss/   leg  pains.  no leg  weakness      + troponin /  elevated  cpk      CAD,  has   10  stents/  HTN/  HLD        cath,   4/18,  2.0 mm caliber TITO to distal LAD,Dr ILDA Dominguez        on  dapt/  ef   47     troponin,  likely   demand  ischemia.  not  mi  per  dr arellano,,  not a  candidate   for  further intervention,   small  caliber  vessels   c/c  Afib,  on eliquis      DM,  follow  fs/  endo  dr durant   CKD    rhabdomyolysis,/  elevated  CPK,    suspect  ?  statin induced.   and   statin  on hold    elevated lfts,   abd  u/s,  normal liver    doppler  legs.  with PAD/  will  f/p  a s an out pt with vascular. when stable   on asa/  eliquis, plavix, / lasix, imdur.  valsartan.  toprol      hold   Farxiga   and  valsartan for  now,  iv fluids,  per  renal   c/c  anemia     elevated  wbc.  afebrile,  doubt  infectious. and  no ab per  ID     follow   lfts/ cpk,   nee d to  r/o  polymyositis, vs  drug induced   myositis,    mri  pelvis,  myositis/  fx  femoral condyle.  ortho  eval  noted,  rec immobilizer  pt refusing    ct a/p, neck, no  malignancy   await   bx   by   surg dr adam palomino/  on iv  steroid  per   rheum       recent   stent/on  4/17/24.   only   option , to   temporarily hold  eliquis  and   continue   plavix,      and   card  dr bravo and  dr brijesh dominguez in  agreement    discussed  with  surg  dr adam palomino,   bx  on  wed.  and   eliquis , no hold     awaiting   muscle  bx /   wbc  noted, from steroid,  cpk/  lfts decreasing     pedal  edema   blister,  dorsum  foot/ on iv lasix/  suspect  from iv fluids/ was   stopped. .  and   from   large dose s of sterodsi       rad< from: TTE W or WO Ultrasound Enhancing Agent (01.29.24 @ 18:15) >  ONCLUSIONS:   1. Left ventricular cavity is small. Left ventricular wall thickness is normal. Left ventricular systolic function is normal with an ejection fraction of 47 % by Ulloa's method of disks.   2. Unable to evaluate wall motion due to poor image quality. There is possible apical hypokinesis.   3. There is severe (grade 3) left ventricular diastolic dysfunction, with elevated filling pressure.   4. Normal right ventricular cavity size and normalsystolic function.   5. There is mild tricuspid regurgitation. Estimated pulmonary artery systolic pressure is 23 mmHg.   6. Compared to the transthoracic echocardiogram performed on 11/26/2023, there have been no significant interval changes.  ____________________________________________________________________  Recommendations:  If clincially indicated, patient should return for repeat echocardiogram with contrast agent, for enhanced left ventricular opacification and improved delineation of the left ventricular endocardial borders. Would suggest a limited study with UEA/ contrast for better delineation of wall motion abnormalities.  ________________________________________________________________________________________  FINDINGS:  < end of copied text >         rad    -

## 2024-05-01 NOTE — PROGRESS NOTE ADULT - ASSESSMENT
70 y/o male with hx of CAD s/p multiple  stents (most recent to pLM (70%), pLAD, mLAD on 1/8/24 with Dr. Dominguez), HFrEF , A-fib, HTN, HLD, T2DM,  CKD3, former smoker  Assessment  DMT2: 69y Male with DM T2 with hyperglycemia, A1C 7.6% , was on oral meds and insulin at home (Farxiga and Semglee basal insulin), now on basal  bolus insulins, sugars still running high.  was started on steroids, having steroid induced hyperglycemias  CAD: on medications, stable, monitored.   HTN: on antihypertensive medications, monitored, asymptomatic.  CKD: Monitor labs/BMP, renal follow up. rhabdo, muscle bx eval       Discussed plan and management with Dr Flo Jamil NP - TEAMS  Myrna Rossi MD  Cell: [phone]  Office: [phone]              68 y/o male with hx of CAD s/p multiple  stents (most recent to pLM (70%), pLAD, mLAD on 1/8/24 with Dr. Dominguez), HFrEF , A-fib, HTN, HLD, T2DM,  CKD3, former smoker  Assessment  DMT2: 69y Male with DM T2 with hyperglycemia, A1C 7.6% , was on oral meds and insulin at home (Farxiga and Semglee basal insulin), now on basal bolus insulins, sugars still running high.  was started on steroids, having steroid induced hyperglycemias  CAD: on medications, stable, monitored.   HTN: on antihypertensive medications, monitored, asymptomatic.  CKD: Monitor labs/BMP, renal follow up. rhabdo, muscle bx eval       Discussed plan and management with Dr Flo Jamil NP - TEAMS  Myrna Rossi MD  Cell: [phone]  Office: [phone]

## 2024-05-01 NOTE — PROGRESS NOTE ADULT - SUBJECTIVE AND OBJECTIVE BOX
SUBJECTIVE:  NO CP no SOB. Feels weak in the legs.     MEDICATIONS:  furosemide   Injectable 40 milliGRAM(s) IV Push daily  isosorbide   dinitrate Tablet (ISORDIL) 30 milliGRAM(s) Oral three times a day  metoprolol tartrate 25 milliGRAM(s) Oral two times a day  nitroglycerin     SubLingual 0.4 milliGRAM(s) SubLingual every 5 minutes PRN    acetaminophen     Tablet .. 650 milliGRAM(s) Oral every 6 hours PRN    polyethylene glycol 3350 17 Gram(s) Oral daily  senna 2 Tablet(s) Oral at bedtime    dextrose 50% Injectable 25 Gram(s) IV Push once  dextrose 50% Injectable 12.5 Gram(s) IV Push once  dextrose Oral Gel 15 Gram(s) Oral once PRN  glucagon  Injectable 1 milliGRAM(s) IntraMuscular once  insulin glargine Injectable (LANTUS) 34 Unit(s) SubCutaneous at bedtime  insulin lispro (ADMELOG) corrective regimen sliding scale   SubCutaneous three times a day before meals  insulin lispro (ADMELOG) corrective regimen sliding scale   SubCutaneous at bedtime  insulin lispro Injectable (ADMELOG) 15 Unit(s) SubCutaneous three times a day before meals  methylPREDNISolone sodium succinate Injectable 20 milliGRAM(s) IV Push two times a day    chlorhexidine 2% Cloths 1 Application(s) Topical daily  clopidogrel Tablet 75 milliGRAM(s) Oral daily  dextrose 10% Bolus 125 milliLiter(s) IV Bolus once  dextrose 5%. 1000 milliLiter(s) IV Continuous <Continuous>  dextrose 5%. 1000 milliLiter(s) IV Continuous <Continuous>  influenza  Vaccine (HIGH DOSE) 0.7 milliLiter(s) IntraMuscular once  sodium chloride 0.45% 1000 milliLiter(s) IV Continuous <Continuous>      REVIEW OF SYSTEMS:    CONSTITUTIONAL: No fever, weight loss, or fatigue  EYES: No eye pain, visual disturbances, or discharge  NECK: No pain or stiffness  RESPIRATORY: No cough, wheezing, chills or hemoptysis; No Shortness of Breath  CARDIOVASCULAR: No chest pain, palpitations, dizziness, or leg swelling  GASTROINTESTINAL: No abdominal or epigastric pain. No nausea, vomiting, or hematemesis; No diarrhea or constipation. No melena or hematochezia.  GENITOURINARY: No dysuria, frequency, hematuria, or incontinence  NEUROLOGICAL: No headaches, memory loss, loss of strength, numbness, or tremors  SKIN: No itching, burning, rashes, or lesions   LYMPH Nodes: No enlarged glands  MUSCULOSKELETAL: No joint pain or swelling; No muscle, back, or extremity pain  All other review of systems are negative.  	  [ ] Unable to obtain    PHYSICAL EXAM:  T(C): 36.4 (05-01-24 @ 04:59), Max: 36.4 (05-01-24 @ 04:59)  HR: 73 (05-01-24 @ 04:59) (73 - 80)  BP: 116/62 (05-01-24 @ 04:59) (116/62 - 129/74)  RR: 18 (05-01-24 @ 04:59) (18 - 18)  SpO2: 97% (05-01-24 @ 04:59) (97% - 100%)  Wt(kg): --  I&O's Summary    30 Apr 2024 07:01  -  01 May 2024 07:00  --------------------------------------------------------  IN: 480 mL / OUT: 2050 mL / NET: -1570 mL          PHYSICAL EXAM    Appearance: Normal	  HEENT:   Normal oral mucosa, PERRL, EOMI	  NECK: Soft and supple, No LAD, No JVD  Cardiovascular: Regular Rate and Rhythm, Normal S1 S2, No murmurs, No clicks, gallops or rubs  Respiratory: Lungs clear to auscultation	  Gastrointestinal:  Soft, Non-tender, + BS	  Skin: No rashes, No ecchymoses, No cyanosis  Neurologic: Non-focal  Extremities: No clubbing, cyanosis 3+ edema  Vascular: Peripheral pulses palpable 2+ bilaterally    TELEMETRY:  AF 70's (per telemetry monitor's report)   LABS:	 	                          10.3   25.19 )-----------( 458      ( 30 Apr 2024 06:07 )             32.2     04-30    135  |  102  |  73<H>  ----------------------------<  273<H>  4.7   |  21<L>  |  1.56<H>    Ca    8.3<L>      30 Apr 2024 06:07  Mg     2.2     04-30    TPro  4.9<L>  /  Alb  2.7<L>  /  TBili  0.6  /  DBili  x   /  AST  696<H>  /  ALT  764<H>  /  AlkPhos  124<H>  04-30

## 2024-05-01 NOTE — PROGRESS NOTE ADULT - SUBJECTIVE AND OBJECTIVE BOX
Overnight events noted      VITAL:  T(C): , Max: 36.4 (05-01-24 @ 04:59)  T(F): , Max: 97.5 (05-01-24 @ 04:59)  HR: 73 (05-01-24 @ 04:59)  BP: 116/62 (05-01-24 @ 04:59)  BP(mean): --  RR: 18 (05-01-24 @ 04:59)  SpO2: 97% (05-01-24 @ 04:59)  Wt(kg): --      PHYSICAL EXAM:  Constitutional: alert, NAD  HEENT: NCAT, MMM  Neck: Supple, No JVD  Respiratory: CTA-b/l  Cardiovascular: RRR s1s2, no m/r/g  Gastrointestinal: BS+, soft, NT/ND  Extremities: (+)b/l LE edema/mild tenderness  Neurological: no focal deficits; strength grossly intact  Back: no CVAT b/l  Skin: No rashes, no nevi    LABS:                        10.3   25.19 )-----------( 458      ( 30 Apr 2024 06:07 )             32.2     Na(135)/K(4.7)/Cl(102)/HCO3(21)/BUN(73)/Cr(1.56)Glu(273)/Ca(8.3)/Mg(2.2)/PO4(--)    04-30 @ 06:07  Na(131)/K(5.3)/Cl(104)/HCO3(13)/BUN(79)/Cr(1.78)Glu(306)/Ca(9.1)/Mg(2.5)/PO4(--)    04-29 @ 06:14      IMPRESSION: 69M w/ HTN, DM2, CAD, AFib, HFrEF, and CKD, s/p recent PCI, 4/25/24 returned with acute rhabdomyolysis    (1)Renal - CKD3-4 - due to diabetes/hypertension - at/near baseline  (2)Rhabdo - resolving as of yesterday==>IVF reduced; labs pending from today  (3)Metabolic acidosis - improved, with HCO3 in the IVF  (4)Rheum - 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 - Surgery and Rheum on board- potentially for muscle biopsy later this week    RECOMMEND:  (1)F/U CPK from today - if CPK <8000, then can attempt d/c of IVF  (2)Lasix 40mg iv daily as ordered  (3)Steroids per primary/Rheum  (4)Muscle biopsy per Surgery  (5)CPK/BMP/Mg daily                Markell Walton MD  [facility]  Office/on call physician: [phone]  Cell (7a-7p): [phone]       (+)LE pain/stiffness b/l; no sob      VITAL:  T(C): , Max: 36.4 (05-01-24 @ 04:59)  T(F): , Max: 97.5 (05-01-24 @ 04:59)  HR: 73 (05-01-24 @ 04:59)  BP: 116/62 (05-01-24 @ 04:59)  BP(mean): --  RR: 18 (05-01-24 @ 04:59)  SpO2: 97% (05-01-24 @ 04:59)  Wt(kg): --      PHYSICAL EXAM:  Constitutional: alert, NAD  HEENT: NCAT, MMM  Neck: Supple, No JVD  Respiratory: CTA-b/l  Cardiovascular: RRR s1s2, no m/r/g  Gastrointestinal: BS+, soft, NT/ND  Extremities: (+)b/l LE edema/mild tenderness  Neurological: no focal deficits; strength grossly intact  Back: no CVAT b/l  Skin: No rashes, no nevi    LABS:                        10.3   25.19 )-----------( 458      ( 30 Apr 2024 06:07 )             32.2     Na(135)/K(4.7)/Cl(102)/HCO3(21)/BUN(73)/Cr(1.56)Glu(273)/Ca(8.3)/Mg(2.2)/PO4(--)    04-30 @ 06:07  Na(131)/K(5.3)/Cl(104)/HCO3(13)/BUN(79)/Cr(1.78)Glu(306)/Ca(9.1)/Mg(2.5)/PO4(--)    04-29 @ 06:14      IMPRESSION: 69M w/ HTN, DM2, CAD, AFib, HFrEF, and CKD, s/p recent PCI, 4/25/24 returned with acute rhabdomyolysis    (1)Renal - CKD3-4 - due to diabetes/hypertension - at/near baseline  (2)Rhabdo - resolving as of yesterday==>IVF reduced; labs pending from today  (3)Metabolic acidosis - improved, with HCO3 in the IVF  (4)Rheum - 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 - Surgery and Rheum on board- potentially for muscle biopsy later this week    RECOMMEND:  (1)F/U CPK from today - if CPK <8000, then can attempt d/c of IVF  (2)Lasix 40mg iv daily as ordered  (3)Steroids per primary/Rheum  (4)Muscle biopsy per Surgery  (5)CPK/BMP/Mg daily                Markell Walton MD  [facility]  Office/on call physician: [phone]  Cell (7a-7p): [phone]

## 2024-05-01 NOTE — SWALLOW BEDSIDE ASSESSMENT ADULT - COMMENTS
70yo M w/ PMH of HTN, HLD, DM2, CAD s/p 10 stents (last 1 week ago), Afib on Eliquis, HFrEF, CKD III pw LE pain/cramping; with acute rhabdomyolysis. Per Rheumatology:  "Denies dysphagia, but mentions that he always needs to drink liquids for solids to pass, but that he always has to do this for years and that it is not new". Per Orthopedics: subacute, non-displaced L lateral condyle insufficiency fx.  -given pt needs to mobilize, chronicity, and lack of L knee pain/TTP, OK to be WBAT LLE (ideally should be WBAT LLE in a knee immobilizer; however, he refused any wrapping or placement of the brace).   Previous admission 04/16-04/19 for NSTEMI s/p LHC w/ stent placement. 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  - worsening edema - agree with Lasix  - Steroids per Rheum  - muscle bx pending. 68yo M w/ PMH of HTN, HLD, DM2, CAD s/p 10 stents (last 1 week ago), Afib on Eliquis, HFrEF, CKD III pw LE pain/cramping; with acute rhabdomyolysis. Per Rheumatology:  "Denies dysphagia, but mentions that he always needs to drink liquids for solids to pass, but that he always has to do this for years and that it is not new". Per ID: Leukocytosis likely reactive (4/29). Per Orthopedics: subacute, non-displaced L lateral condyle insufficiency fx; given pt needs to mobilize, chronicity, and lack of L knee pain/TTP, OK to be WBAT LLE (ideally should be WBAT LLE in a knee immobilizer; however, he refused any wrapping or placement of the brace).   Previous admission 04/16-04/19 for NSTEMI s/p LHC w/ stent placement. 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  - worsening edema - agree with Lasix  - Steroids per Rheum  - muscle bx pending.

## 2024-05-01 NOTE — SWALLOW BEDSIDE ASSESSMENT ADULT - ADDITIONAL RECOMMENDATIONS
Restorative swallowing intervention is not indicated therefore this service to sign off.  Request MD to reconsult this dept should a change in status occur.

## 2024-05-01 NOTE — PROGRESS NOTE ADULT - SUBJECTIVE AND OBJECTIVE BOX
Chief complaint  Patient is a 69y old  Male who presents with a chief complaint of Rhabdo (01 May 2024 10:38)         Labs and Fingersticks  CAPILLARY BLOOD GLUCOSE      POCT Blood Glucose.: 267 mg/dL (01 May 2024 12:45)  POCT Blood Glucose.: 245 mg/dL (01 May 2024 11:36)  POCT Blood Glucose.: 193 mg/dL (01 May 2024 07:53)  POCT Blood Glucose.: 192 mg/dL (30 Apr 2024 21:25)  POCT Blood Glucose.: 267 mg/dL (30 Apr 2024 16:47)      Anion Gap: 12 (05-01 @ 08:25)  Anion Gap: 12 (04-30 @ 06:07)      Calcium: 9.2 (05-01 @ 08:25)  Calcium: 8.3 *L* (04-30 @ 06:07)  Albumin: 2.7 *L* (05-01 @ 08:25)  Albumin: 2.7 *L* (04-30 @ 06:07)    Alanine Aminotransferase (ALT/SGPT): 730 *H* (05-01 @ 08:25)  Alanine Aminotransferase (ALT/SGPT): 764 *H* (04-30 @ 06:07)  Alkaline Phosphatase: 118 (05-01 @ 08:25)  Alkaline Phosphatase: 124 *H* (04-30 @ 06:07)  Aspartate Aminotransferase (AST/SGOT): 597 *H* (05-01 @ 08:25)  Aspartate Aminotransferase (AST/SGOT): 696 *H* (04-30 @ 06:07)        05-01    137  |  103  |  70<H>  ----------------------------<  178<H>  4.5   |  22  |  1.38<H>    Ca    9.2      01 May 2024 08:25  Mg     2.2     04-30    TPro  5.1<L>  /  Alb  2.7<L>  /  TBili  0.7  /  DBili  x   /  AST  597<H>  /  ALT  730<H>  /  AlkPhos  118  05-01                        10.9   31.72 )-----------( 502      ( 01 May 2024 08:25 )             34.2     Medications  MEDICATIONS  (STANDING):  buMETAnide Injectable 0.5 milliGRAM(s) IV Push once  chlorhexidine 2% Cloths 1 Application(s) Topical daily  clopidogrel Tablet 75 milliGRAM(s) Oral daily  dextrose 10% Bolus 125 milliLiter(s) IV Bolus once  dextrose 5%. 1000 milliLiter(s) (50 mL/Hr) IV Continuous <Continuous>  dextrose 5%. 1000 milliLiter(s) (100 mL/Hr) IV Continuous <Continuous>  dextrose 50% Injectable 12.5 Gram(s) IV Push once  dextrose 50% Injectable 25 Gram(s) IV Push once  furosemide   Injectable 40 milliGRAM(s) IV Push daily  glucagon  Injectable 1 milliGRAM(s) IntraMuscular once  influenza  Vaccine (HIGH DOSE) 0.7 milliLiter(s) IntraMuscular once  insulin glargine Injectable (LANTUS) 34 Unit(s) SubCutaneous at bedtime  insulin lispro (ADMELOG) corrective regimen sliding scale   SubCutaneous three times a day before meals  insulin lispro (ADMELOG) corrective regimen sliding scale   SubCutaneous at bedtime  insulin lispro Injectable (ADMELOG) 15 Unit(s) SubCutaneous three times a day before meals  isosorbide   dinitrate Tablet (ISORDIL) 30 milliGRAM(s) Oral three times a day  methylPREDNISolone sodium succinate Injectable 20 milliGRAM(s) IV Push two times a day  metoprolol tartrate 25 milliGRAM(s) Oral two times a day  polyethylene glycol 3350 17 Gram(s) Oral daily  senna 2 Tablet(s) Oral at bedtime  silver sulfADIAZINE 1% Cream 1 Application(s) Topical daily      Physical Exam  General: Patient comfortable in bed   Vital Signs Last 12 Hrs  T(F): 97.7 (05-01-24 @ 11:02), Max: 97.7 (05-01-24 @ 11:02)  HR: 79 (05-01-24 @ 11:02) (73 - 79)  BP: 116/64 (05-01-24 @ 11:02) (116/62 - 116/64)  BP(mean): --  RR: 18 (05-01-24 @ 11:02) (18 - 18)  SpO2: 97% (05-01-24 @ 11:02) (97% - 97%)    CVS: S1S2   Respiratory: No wheezing, no crepitations  GI: Abdomen soft, bowel sounds positive  Musculoskeletal:  moves all extremities  : Voiding          Chief complaint  Patient is a 69y old  Male who presents with a chief complaint of Rhabdo (01 May 2024 10:38)         Labs and Fingersticks  CAPILLARY BLOOD GLUCOSE    POCT Blood Glucose.: 267 mg/dL (01 May 2024 12:45)  POCT Blood Glucose.: 245 mg/dL (01 May 2024 11:36)  POCT Blood Glucose.: 193 mg/dL (01 May 2024 07:53)  POCT Blood Glucose.: 192 mg/dL (30 Apr 2024 21:25)  POCT Blood Glucose.: 267 mg/dL (30 Apr 2024 16:47)      Anion Gap: 12 (05-01 @ 08:25)  Anion Gap: 12 (04-30 @ 06:07)      Calcium: 9.2 (05-01 @ 08:25)  Calcium: 8.3 *L* (04-30 @ 06:07)  Albumin: 2.7 *L* (05-01 @ 08:25)  Albumin: 2.7 *L* (04-30 @ 06:07)    Alanine Aminotransferase (ALT/SGPT): 730 *H* (05-01 @ 08:25)  Alanine Aminotransferase (ALT/SGPT): 764 *H* (04-30 @ 06:07)  Alkaline Phosphatase: 118 (05-01 @ 08:25)  Alkaline Phosphatase: 124 *H* (04-30 @ 06:07)  Aspartate Aminotransferase (AST/SGOT): 597 *H* (05-01 @ 08:25)  Aspartate Aminotransferase (AST/SGOT): 696 *H* (04-30 @ 06:07)        05-01    137  |  103  |  70<H>  ----------------------------<  178<H>  4.5   |  22  |  1.38<H>    Ca    9.2      01 May 2024 08:25  Mg     2.2     04-30    TPro  5.1<L>  /  Alb  2.7<L>  /  TBili  0.7  /  DBili  x   /  AST  597<H>  /  ALT  730<H>  /  AlkPhos  118  05-01                        10.9   31.72 )-----------( 502      ( 01 May 2024 08:25 )             34.2     Medications  MEDICATIONS  (STANDING):  buMETAnide Injectable 0.5 milliGRAM(s) IV Push once  chlorhexidine 2% Cloths 1 Application(s) Topical daily  clopidogrel Tablet 75 milliGRAM(s) Oral daily  dextrose 10% Bolus 125 milliLiter(s) IV Bolus once  dextrose 5%. 1000 milliLiter(s) (50 mL/Hr) IV Continuous <Continuous>  dextrose 5%. 1000 milliLiter(s) (100 mL/Hr) IV Continuous <Continuous>  dextrose 50% Injectable 12.5 Gram(s) IV Push once  dextrose 50% Injectable 25 Gram(s) IV Push once  furosemide   Injectable 40 milliGRAM(s) IV Push daily  glucagon  Injectable 1 milliGRAM(s) IntraMuscular once  influenza  Vaccine (HIGH DOSE) 0.7 milliLiter(s) IntraMuscular once  insulin glargine Injectable (LANTUS) 34 Unit(s) SubCutaneous at bedtime  insulin lispro (ADMELOG) corrective regimen sliding scale   SubCutaneous three times a day before meals  insulin lispro (ADMELOG) corrective regimen sliding scale   SubCutaneous at bedtime  insulin lispro Injectable (ADMELOG) 15 Unit(s) SubCutaneous three times a day before meals  isosorbide   dinitrate Tablet (ISORDIL) 30 milliGRAM(s) Oral three times a day  methylPREDNISolone sodium succinate Injectable 20 milliGRAM(s) IV Push two times a day  metoprolol tartrate 25 milliGRAM(s) Oral two times a day  polyethylene glycol 3350 17 Gram(s) Oral daily  senna 2 Tablet(s) Oral at bedtime  silver sulfADIAZINE 1% Cream 1 Application(s) Topical daily      Physical Exam  General: Patient comfortable in bed   Vital Signs Last 12 Hrs  T(F): 97.7 (05-01-24 @ 11:02), Max: 97.7 (05-01-24 @ 11:02)  HR: 79 (05-01-24 @ 11:02) (73 - 79)  BP: 116/64 (05-01-24 @ 11:02) (116/62 - 116/64)  BP(mean): --  RR: 18 (05-01-24 @ 11:02) (18 - 18)  SpO2: 97% (05-01-24 @ 11:02) (97% - 97%)    CVS: S1S2   Respiratory: No wheezing, no crepitations  GI: Abdomen soft, bowel sounds positive  Musculoskeletal:  moves all extremities  : Voiding

## 2024-05-01 NOTE — PROGRESS NOTE ADULT - ASSESSMENT
70yo M w/ PMH of HTN, HLD, DM2, CAD s/p 10 stents (last 1 week ago), Afib on Eliquis, HFrEF, CKD III pw LE pain/cramping.  On 04/16-04/19 admitted for NSTEMI s/p C w/ stent placement. 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 Hold Eliquis Maintain Plavix. Restart Eliquis post bx when deemed safe from Rheum standpoint  - will need PCSK9 inhibitor for hypercholesterolemia. Can never be on statin again CPK [id number] yesterday

## 2024-05-01 NOTE — PROGRESS NOTE ADULT - ASSESSMENT
Assessment: 69yr M with history of HTN, HLD, DM2, CAD c/b NSTEMI (4/16) s/p 10 stents (last 1 week prior to admission, Plavix), Afib on Eliquis, CHF, and CKD who is currently admitted for evaluation of rhabdomyolysis. Vascular surgery consultation to rule out PAD. VA arterial duplex demonstrating right popliteal artery stenosis vs occlusion. Also left TP trunk stenosis. Patient sensory-motor intact with doppler signals b/l DP + PT.    Plan:  - L foot blister unroofed, dressed with silvadene and gauze  - Muscle biopsy per general surgery tomorrow  - Please call back if any questions or concerns    Vascular Surgery  [id number]

## 2024-05-01 NOTE — PROGRESS NOTE ADULT - SUBJECTIVE AND OBJECTIVE BOX
SURGERY DAILY PROGRESS NOTE    SUBJECTIVE: Patient seen and evaluated at the bedside.     OBJECTIVE:  Vital Signs Last 24 Hrs  T(C): 36.5 (01 May 2024 11:02), Max: 36.5 (01 May 2024 11:02)  T(F): 97.7 (01 May 2024 11:02), Max: 97.7 (01 May 2024 11:02)  HR: 79 (01 May 2024 11:02) (73 - 79)  BP: 116/64 (01 May 2024 11:02) (116/62 - 123/68)  BP(mean): --  RR: 18 (01 May 2024 11:02) (18 - 18)  SpO2: 97% (01 May 2024 11:02) (97% - 98%)    Parameters below as of 01 May 2024 11:02  Patient On (Oxygen Delivery Method): room air    I&O's Detail    2024 07:01  -  01 May 2024 07:00  --------------------------------------------------------  IN:    Oral Fluid: 480 mL  Total IN: 480 mL    OUT:    Voided (mL): 2050 mL  Total OUT: 2050 mL    Total NET: -1570 mL      01 May 2024 07:01  -  01 May 2024 17:05  --------------------------------------------------------  IN:    Oral Fluid: 780 mL  Total IN: 780 mL    OUT:    Voided (mL): 1200 mL  Total OUT: 1200 mL    Total NET: -420 mL    Daily Weight in k.1 (01 May 2024 07:50)  MEDICATIONS  (STANDING):  buMETAnide Injectable 0.5 milliGRAM(s) IV Push once  chlorhexidine 2% Cloths 1 Application(s) Topical daily  clopidogrel Tablet 75 milliGRAM(s) Oral daily  dextrose 10% Bolus 125 milliLiter(s) IV Bolus once  dextrose 5%. 1000 milliLiter(s) (100 mL/Hr) IV Continuous <Continuous>  dextrose 5%. 1000 milliLiter(s) (50 mL/Hr) IV Continuous <Continuous>  dextrose 50% Injectable 12.5 Gram(s) IV Push once  dextrose 50% Injectable 25 Gram(s) IV Push once  furosemide   Injectable 40 milliGRAM(s) IV Push daily  glucagon  Injectable 1 milliGRAM(s) IntraMuscular once  influenza  Vaccine (HIGH DOSE) 0.7 milliLiter(s) IntraMuscular once  insulin glargine Injectable (LANTUS) 34 Unit(s) SubCutaneous at bedtime  insulin lispro (ADMELOG) corrective regimen sliding scale   SubCutaneous three times a day before meals  insulin lispro (ADMELOG) corrective regimen sliding scale   SubCutaneous at bedtime  insulin lispro Injectable (ADMELOG) 15 Unit(s) SubCutaneous three times a day before meals  isosorbide   dinitrate Tablet (ISORDIL) 30 milliGRAM(s) Oral three times a day  methylPREDNISolone sodium succinate Injectable 20 milliGRAM(s) IV Push two times a day  metoprolol tartrate 25 milliGRAM(s) Oral two times a day  polyethylene glycol 3350 17 Gram(s) Oral daily  senna 2 Tablet(s) Oral at bedtime  silver sulfADIAZINE 1% Cream 1 Application(s) Topical daily    MEDICATIONS  (PRN):  acetaminophen     Tablet .. 650 milliGRAM(s) Oral every 6 hours PRN Temp greater or equal to 38C (100.4F), Mild Pain (1 - 3)  dextrose Oral Gel 15 Gram(s) Oral once PRN Blood Glucose LESS THAN 70 milliGRAM(s)/deciliter      LABS:                        10.9   31.72 )-----------( 502      ( 01 May 2024 08:25 )             34.2     05-    137  |  103  |  70<H>  ----------------------------<  178<H>  4.5   |  22  |  1.38<H>    Ca    9.2      01 May 2024 08:25  Mg     2.2     04-30    TPro  5.1<L>  /  Alb  2.7<L>  /  TBili  0.7  /  DBili  x   /  AST  597<H>  /  ALT  730<H>  /  AlkPhos  118  05-      Urinalysis Basic - ( 01 May 2024 08:25 )    Color: x / Appearance: x / SG: x / pH: x  Gluc: 178 mg/dL / Ketone: x  / Bili: x / Urobili: x   Blood: x / Protein: x / Nitrite: x   Leuk Esterase: x / RBC: x / WBC x   Sq Epi: x / Non Sq Epi: x / Bacteria: x    PHYSICAL EXAM:  Constitutional: No acute distress  Pulmonary: Symmetric chest rise bilaterally, no increased WOB, on room air   Extremities: Warm, pink, blister of the dorsal aspect of left foot

## 2024-05-01 NOTE — PROGRESS NOTE ADULT - SUBJECTIVE AND OBJECTIVE BOX
SURGERY DAILY PROGRESS NOTE    STATUS POST:      SUBJECTIVE: Pt seen and examined at bedside. Pt feeling well, no complaints. Pt aware of plan for muscle biopsy tomorrow.       OBJECTIVE:  Vital Signs Last 24 Hrs  T(C): 36.4 (01 May 2024 04:59), Max: 36.4 (01 May 2024 04:59)  T(F): 97.5 (01 May 2024 04:59), Max: 97.5 (01 May 2024 04:59)  HR: 73 (01 May 2024 04:59) (73 - 80)  BP: 116/62 (01 May 2024 04:59) (116/62 - 129/74)  BP(mean): --  RR: 18 (01 May 2024 04:59) (18 - 18)  SpO2: 97% (01 May 2024 04:59) (97% - 100%)    Parameters below as of 01 May 2024 04:59  Patient On (Oxygen Delivery Method): nasal cannula        I&O's Summary    30 Apr 2024 07:01  -  01 May 2024 07:00  --------------------------------------------------------  IN: 480 mL / OUT: 2050 mL / NET: -1570 mL    01 May 2024 07:01  -  01 May 2024 10:38  --------------------------------------------------------  IN: 0 mL / OUT: 900 mL / NET: -900 mL        Physical Exam:  General Appearance: Appears well, NAD, A& O x 3  Chest: Equal expansion bilaterally  Abdomen: Soft, nontense  Extremities: Grossly symmetric, SCD's in place     LABS:                        10.9   31.72 )-----------( 502      ( 01 May 2024 08:25 )             34.2     05-01    137  |  103  |  70<H>  ----------------------------<  178<H>  4.5   |  22  |  1.38<H>    Ca    9.2      01 May 2024 08:25  Mg     2.2     04-30    TPro  5.1<L>  /  Alb  2.7<L>  /  TBili  0.7  /  DBili  x   /  AST  597<H>  /  ALT  730<H>  /  AlkPhos  118  05-01      Urinalysis Basic - ( 01 May 2024 08:25 )    Color: x / Appearance: x / SG: x / pH: x  Gluc: 178 mg/dL / Ketone: x  / Bili: x / Urobili: x   Blood: x / Protein: x / Nitrite: x   Leuk Esterase: x / RBC: x / WBC x   Sq Epi: x / Non Sq Epi: x / Bacteria: x        RADIOLOGY & ADDITIONAL STUDIES:

## 2024-05-01 NOTE — PRE PROCEDURE NOTE - PRE PROCEDURE EVALUATION
Preop Diagnosis: Rhabdomyolysis   Planned Procedure: Muscle Biopsy   Surgeon: Dr. Briseno     Vital Signs Last 24 Hrs  T(C): 36.5 (01 May 2024 11:02), Max: 36.5 (01 May 2024 11:02)  T(F): 97.7 (01 May 2024 11:02), Max: 97.7 (01 May 2024 11:02)  HR: 79 (01 May 2024 11:) (73 - 79)  BP: 116/64 (01 May 2024 11:02) (116/62 - 123/68)  BP(mean): --  RR: 18 (01 May 2024 11:) (18 - 18)  SpO2: 97% (01 May 2024 11:) (97% - 98%)    Parameters below as of 01 May 2024 11:02  Patient On (Oxygen Delivery Method): room air      Daily     Daily Weight in k.1 (01 May 2024 07:50)    Pertinent Labs:                        10.9   31.72 )-----------( 502      ( 01 May 2024 08:25 )             34.2     05-    137  |  103  |  70<H>  ----------------------------<  178<H>  4.5   |  22  |  1.38<H>    Ca    9.2      01 May 2024 08:25  Mg     2.2     04-30    TPro  5.1<L>  /  Alb  2.7<L>  /  TBili  0.7  /  DBili  x   /  AST  597<H>  /  ALT  730<H>  /  AlkPhos  118  05      -----------------------------------------------------------------------------------------------------------------------------------  Type and Screen:  -----------------------------------------------------------------------------------------------------------------------------------  U/A:  -----------------------------------------------------------------------------------------------------------------------------------  uHCG:  -----------------------------------------------------------------------------------------------------------------------------------  CXR:  -----------------------------------------------------------------------------------------------------------------------------------  EKG:  -----------------------------------------------------------------------------------------------------------------------------------  Echo:  -----------------------------------------------------------------------------------------------------------------------------------    A/P: 69y Male HPI:  Pt is a 70yo M w/ PMH of HTN, HLD, DM2, CAD s/p 10 stents (last 1 week ago), Afib on Eliquis, HFrEF, CKD III pw LE pain/cramping. Pt recently admitted - for NSTEMI s/p C w/ stent placement.  In the ED VSS w/ labs notable for leukocytosis to 15 and Cr 1.92 (~baseline) w/ elevated liver enzymes, trop 505 -> 506[place] [address]. He was administered 2L IVF. Pt admitted for Rhabdomyolysis.           Plan:  - OR 24 for Right Hamstring Muscle Biopsy with Dr. Briseno  - NPO after midnight, except medications   - IVF while NPO  - Consent signed in chart  - Pre-op Labs at 4:00 am on /: CBC, BMP, Mag, Phos, PT/PTT/INR, T&S   - Anticoagulation held       Surgery  p

## 2024-05-01 NOTE — SWALLOW BEDSIDE ASSESSMENT ADULT - SWALLOW EVAL: DIAGNOSIS
Pt presents with overtly functional oropharyngeal swallowing skills.  Formation, control and transfer of the bolus were judged to be adequate.  No signs or symptoms of laryngeal penetration/aspiration evident with any consistency presented.  Pharyngeal swallow judged to be timely and laryngeal elevation was palpated. Pt is a 68 y/o male admitted with suspected acute rhabdomyolysis who presents with overtly functional oropharyngeal swallowing skills.  Formation, control and transfer of the bolus were judged to be adequate.  No signs or symptoms of laryngeal penetration/aspiration evident with any consistency presented.  Pharyngeal swallow judged to be timely and laryngeal elevation was palpated.

## 2024-05-01 NOTE — SWALLOW BEDSIDE ASSESSMENT ADULT - SLP GENERAL OBSERVATIONS
Pt awake, alert and Ox3. Pt reports tightness in jaw with wide mouth opening however oral exam was unremarkable and mandibular ROM deemed to be adequate.

## 2024-05-02 ENCOUNTER — RESULT REVIEW (OUTPATIENT)
Age: 69
End: 2024-05-02

## 2024-05-02 LAB
% GAMMA, URINE: 6.1 % — SIGNIFICANT CHANGE UP
ALBUMIN 24H MFR UR ELPH: 15.2 % — SIGNIFICANT CHANGE UP
ALPHA1 GLOB 24H MFR UR ELPH: 38.8 % — SIGNIFICANT CHANGE UP
ALPHA2 GLOB 24H MFR UR ELPH: 14.1 % — SIGNIFICANT CHANGE UP
ANION GAP SERPL CALC-SCNC: 12 MMOL/L — SIGNIFICANT CHANGE UP (ref 5–17)
APTT BLD: 31.1 SEC — SIGNIFICANT CHANGE UP (ref 24.5–35.6)
B-GLOBULIN 24H MFR UR ELPH: 25.8 % — SIGNIFICANT CHANGE UP
BUN SERPL-MCNC: 72 MG/DL — HIGH (ref 7–23)
CALCIUM SERPL-MCNC: 9 MG/DL — SIGNIFICANT CHANGE UP (ref 8.4–10.5)
CHLORIDE SERPL-SCNC: 99 MMOL/L — SIGNIFICANT CHANGE UP (ref 96–108)
CK SERPL-CCNC: 7646 U/L — HIGH (ref 30–200)
CO2 SERPL-SCNC: 24 MMOL/L — SIGNIFICANT CHANGE UP (ref 22–31)
COLLECT DURATION TIME UR: 24 HR — SIGNIFICANT CHANGE UP
CREAT SERPL-MCNC: 1.52 MG/DL — HIGH (ref 0.5–1.3)
CREATININE, URINE RESULT: 26 MG/DL — SIGNIFICANT CHANGE UP
EGFR: 50 ML/MIN/1.73M2 — LOW
GAMMA INTERFERON BACKGROUND BLD IA-ACNC: 0.01 IU/ML — SIGNIFICANT CHANGE UP
GLUCOSE BLDC GLUCOMTR-MCNC: 233 MG/DL — HIGH (ref 70–99)
GLUCOSE BLDC GLUCOMTR-MCNC: 268 MG/DL — HIGH (ref 70–99)
GLUCOSE BLDC GLUCOMTR-MCNC: 275 MG/DL — HIGH (ref 70–99)
GLUCOSE BLDC GLUCOMTR-MCNC: 327 MG/DL — HIGH (ref 70–99)
GLUCOSE SERPL-MCNC: 288 MG/DL — HIGH (ref 70–99)
HCT VFR BLD CALC: 33.9 % — LOW (ref 39–50)
HGB BLD-MCNC: 11.2 G/DL — LOW (ref 13–17)
INR BLD: 1.43 RATIO — HIGH (ref 0.85–1.18)
INTERPRETATION 24H UR IFE-IMP: SIGNIFICANT CHANGE UP
M PROTEIN 24H UR ELPH-MRATE: 0 % — SIGNIFICANT CHANGE UP
M PROTEIN 24H UR ELPH-MRATE: 0 MG/24HR — SIGNIFICANT CHANGE UP (ref 0–0)
M PROTEIN 24H UR ELPH-MRATE: 0 MG/DL — SIGNIFICANT CHANGE UP
M PROTEIN 24H UR ELPH-MRATE: SIGNIFICANT CHANGE UP MG/DL
M TB IFN-G BLD-IMP: ABNORMAL
M TB IFN-G CD4+ BCKGRND COR BLD-ACNC: 0 IU/ML — SIGNIFICANT CHANGE UP
M TB IFN-G CD4+CD8+ BCKGRND COR BLD-ACNC: 0 IU/ML — SIGNIFICANT CHANGE UP
MAGNESIUM SERPL-MCNC: 2.2 MG/DL — SIGNIFICANT CHANGE UP (ref 1.6–2.6)
MCHC RBC-ENTMCNC: 28.4 PG — SIGNIFICANT CHANGE UP (ref 27–34)
MCHC RBC-ENTMCNC: 33 GM/DL — SIGNIFICANT CHANGE UP (ref 32–36)
MCV RBC AUTO: 86 FL — SIGNIFICANT CHANGE UP (ref 80–100)
NRBC # BLD: 0 /100 WBCS — SIGNIFICANT CHANGE UP (ref 0–0)
PHOSPHATE SERPL-MCNC: 3.9 MG/DL — SIGNIFICANT CHANGE UP (ref 2.5–4.5)
PLATELET # BLD AUTO: 472 K/UL — HIGH (ref 150–400)
POTASSIUM SERPL-MCNC: 4.5 MMOL/L — SIGNIFICANT CHANGE UP (ref 3.5–5.3)
POTASSIUM SERPL-SCNC: 4.5 MMOL/L — SIGNIFICANT CHANGE UP (ref 3.5–5.3)
PROT ?TM UR-MCNC: 14 MG/DL — HIGH (ref 0–12)
PROT ?TM UR-MCNC: 14 MG/DL — HIGH (ref 0–12)
PROT ?TM UR-MCNC: SIGNIFICANT CHANGE UP MG/DL (ref 0–12)
PROT PATTERN 24H UR ELPH-IMP: SIGNIFICANT CHANGE UP
PROT PATTERN 24H UR ELPH-IMP: SIGNIFICANT CHANGE UP
PROTEIN QUANT CALC, URINE: 494 MG/24 H — HIGH (ref 50–100)
PROTHROM AB SERPL-ACNC: 15.6 SEC — HIGH (ref 9.5–13)
QUANT TB PLUS MITOGEN MINUS NIL: 0.01 IU/ML — SIGNIFICANT CHANGE UP
RBC # BLD: 3.94 M/UL — LOW (ref 4.2–5.8)
RBC # FLD: 16.6 % — HIGH (ref 10.3–14.5)
SODIUM SERPL-SCNC: 135 MMOL/L — SIGNIFICANT CHANGE UP (ref 135–145)
TOTAL VOLUME - 24 HOUR: 3525 ML — SIGNIFICANT CHANGE UP
URINE CREATININE CALCULATION: 0.9 G/24 H — LOW (ref 1–2)
URINE CREATININE CALCULATION: SIGNIFICANT CHANGE UP G/24 H (ref 1–2)
WBC # BLD: 26.97 K/UL — HIGH (ref 3.8–10.5)
WBC # FLD AUTO: 26.97 K/UL — HIGH (ref 3.8–10.5)

## 2024-05-02 PROCEDURE — 20205 DEEP MUSCLE BIOPSY: CPT | Mod: GC

## 2024-05-02 PROCEDURE — 88300 SURGICAL PATH GROSS: CPT | Mod: 26

## 2024-05-02 PROCEDURE — 99232 SBSQ HOSP IP/OBS MODERATE 35: CPT | Mod: GC

## 2024-05-02 RX ORDER — CLOPIDOGREL BISULFATE 75 MG/1
75 TABLET, FILM COATED ORAL DAILY
Refills: 0 | Status: DISCONTINUED | OUTPATIENT
Start: 2024-05-02 | End: 2024-05-14

## 2024-05-02 RX ORDER — INSULIN LISPRO 100/ML
VIAL (ML) SUBCUTANEOUS AT BEDTIME
Refills: 0 | Status: DISCONTINUED | OUTPATIENT
Start: 2024-05-02 | End: 2024-05-14

## 2024-05-02 RX ORDER — SODIUM CHLORIDE 9 MG/ML
1000 INJECTION, SOLUTION INTRAVENOUS
Refills: 0 | Status: DISCONTINUED | OUTPATIENT
Start: 2024-05-02 | End: 2024-05-14

## 2024-05-02 RX ORDER — ISOSORBIDE DINITRATE 5 MG/1
30 TABLET ORAL THREE TIMES A DAY
Refills: 0 | Status: DISCONTINUED | OUTPATIENT
Start: 2024-05-02 | End: 2024-05-14

## 2024-05-02 RX ORDER — DEXTROSE 50 % IN WATER 50 %
12.5 SYRINGE (ML) INTRAVENOUS ONCE
Refills: 0 | Status: DISCONTINUED | OUTPATIENT
Start: 2024-05-02 | End: 2024-05-14

## 2024-05-02 RX ORDER — SENNA PLUS 8.6 MG/1
2 TABLET ORAL AT BEDTIME
Refills: 0 | Status: DISCONTINUED | OUTPATIENT
Start: 2024-05-02 | End: 2024-05-14

## 2024-05-02 RX ORDER — DEXTROSE 10 % IN WATER 10 %
125 INTRAVENOUS SOLUTION INTRAVENOUS ONCE
Refills: 0 | Status: DISCONTINUED | OUTPATIENT
Start: 2024-05-02 | End: 2024-05-14

## 2024-05-02 RX ORDER — SODIUM CHLORIDE 9 MG/ML
1000 INJECTION INTRAMUSCULAR; INTRAVENOUS; SUBCUTANEOUS
Refills: 0 | Status: DISCONTINUED | OUTPATIENT
Start: 2024-05-02 | End: 2024-05-05

## 2024-05-02 RX ORDER — DEXTROSE 50 % IN WATER 50 %
15 SYRINGE (ML) INTRAVENOUS ONCE
Refills: 0 | Status: DISCONTINUED | OUTPATIENT
Start: 2024-05-02 | End: 2024-05-14

## 2024-05-02 RX ORDER — INSULIN GLARGINE 100 [IU]/ML
34 INJECTION, SOLUTION SUBCUTANEOUS AT BEDTIME
Refills: 0 | Status: DISCONTINUED | OUTPATIENT
Start: 2024-05-02 | End: 2024-05-04

## 2024-05-02 RX ORDER — ACETAMINOPHEN 500 MG
650 TABLET ORAL EVERY 6 HOURS
Refills: 0 | Status: DISCONTINUED | OUTPATIENT
Start: 2024-05-02 | End: 2024-05-14

## 2024-05-02 RX ORDER — INSULIN LISPRO 100/ML
VIAL (ML) SUBCUTANEOUS
Refills: 0 | Status: DISCONTINUED | OUTPATIENT
Start: 2024-05-02 | End: 2024-05-14

## 2024-05-02 RX ORDER — GLUCAGON INJECTION, SOLUTION 0.5 MG/.1ML
1 INJECTION, SOLUTION SUBCUTANEOUS ONCE
Refills: 0 | Status: DISCONTINUED | OUTPATIENT
Start: 2024-05-02 | End: 2024-05-14

## 2024-05-02 RX ORDER — INSULIN LISPRO 100/ML
15 VIAL (ML) SUBCUTANEOUS
Refills: 0 | Status: DISCONTINUED | OUTPATIENT
Start: 2024-05-02 | End: 2024-05-04

## 2024-05-02 RX ORDER — METOPROLOL TARTRATE 50 MG
25 TABLET ORAL
Refills: 0 | Status: DISCONTINUED | OUTPATIENT
Start: 2024-05-02 | End: 2024-05-04

## 2024-05-02 RX ORDER — FUROSEMIDE 40 MG
40 TABLET ORAL DAILY
Refills: 0 | Status: DISCONTINUED | OUTPATIENT
Start: 2024-05-02 | End: 2024-05-08

## 2024-05-02 RX ORDER — ONDANSETRON 8 MG/1
4 TABLET, FILM COATED ORAL ONCE
Refills: 0 | Status: DISCONTINUED | OUTPATIENT
Start: 2024-05-02 | End: 2024-05-02

## 2024-05-02 RX ORDER — HYDROMORPHONE HYDROCHLORIDE 2 MG/ML
0.2 INJECTION INTRAMUSCULAR; INTRAVENOUS; SUBCUTANEOUS
Refills: 0 | Status: DISCONTINUED | OUTPATIENT
Start: 2024-05-02 | End: 2024-05-02

## 2024-05-02 RX ORDER — SODIUM CHLORIDE 9 MG/ML
1000 INJECTION INTRAMUSCULAR; INTRAVENOUS; SUBCUTANEOUS
Refills: 0 | Status: DISCONTINUED | OUTPATIENT
Start: 2024-05-02 | End: 2024-05-02

## 2024-05-02 RX ORDER — DEXTROSE 50 % IN WATER 50 %
25 SYRINGE (ML) INTRAVENOUS ONCE
Refills: 0 | Status: DISCONTINUED | OUTPATIENT
Start: 2024-05-02 | End: 2024-05-14

## 2024-05-02 RX ORDER — CHLORHEXIDINE GLUCONATE 213 G/1000ML
1 SOLUTION TOPICAL DAILY
Refills: 0 | Status: DISCONTINUED | OUTPATIENT
Start: 2024-05-02 | End: 2024-05-14

## 2024-05-02 RX ORDER — POLYETHYLENE GLYCOL 3350 17 G/17G
17 POWDER, FOR SOLUTION ORAL DAILY
Refills: 0 | Status: DISCONTINUED | OUTPATIENT
Start: 2024-05-02 | End: 2024-05-06

## 2024-05-02 RX ORDER — OXYCODONE HYDROCHLORIDE 5 MG/1
5 TABLET ORAL ONCE
Refills: 0 | Status: DISCONTINUED | OUTPATIENT
Start: 2024-05-02 | End: 2024-05-02

## 2024-05-02 RX ADMIN — Medication 40 MILLIGRAM(S): at 12:25

## 2024-05-02 RX ADMIN — Medication 15 UNIT(S): at 17:28

## 2024-05-02 RX ADMIN — Medication 20 MILLIGRAM(S): at 06:24

## 2024-05-02 RX ADMIN — Medication 15 UNIT(S): at 12:24

## 2024-05-02 RX ADMIN — ISOSORBIDE DINITRATE 30 MILLIGRAM(S): 5 TABLET ORAL at 06:23

## 2024-05-02 RX ADMIN — ISOSORBIDE DINITRATE 30 MILLIGRAM(S): 5 TABLET ORAL at 17:31

## 2024-05-02 RX ADMIN — Medication 4: at 17:28

## 2024-05-02 RX ADMIN — INSULIN GLARGINE 34 UNIT(S): 100 INJECTION, SOLUTION SUBCUTANEOUS at 22:36

## 2024-05-02 RX ADMIN — CHLORHEXIDINE GLUCONATE 1 APPLICATION(S): 213 SOLUTION TOPICAL at 12:25

## 2024-05-02 RX ADMIN — CLOPIDOGREL BISULFATE 75 MILLIGRAM(S): 75 TABLET, FILM COATED ORAL at 12:24

## 2024-05-02 RX ADMIN — Medication 25 MILLIGRAM(S): at 17:31

## 2024-05-02 RX ADMIN — Medication 1: at 22:36

## 2024-05-02 RX ADMIN — Medication 25 MILLIGRAM(S): at 06:23

## 2024-05-02 RX ADMIN — Medication 1 APPLICATION(S): at 12:25

## 2024-05-02 RX ADMIN — Medication 3: at 12:24

## 2024-05-02 RX ADMIN — Medication 20 MILLIGRAM(S): at 17:30

## 2024-05-02 RX ADMIN — ISOSORBIDE DINITRATE 30 MILLIGRAM(S): 5 TABLET ORAL at 12:23

## 2024-05-02 NOTE — PROGRESS NOTE ADULT - ASSESSMENT
68 y/o male with hx of CAD s/p multiple  stents (most recent to pLM (70%), pLAD, mLAD on 1/8/24 with Dr. Dominguez), HFrEF , A-fib, HTN, HLD, T2DM,  CKD3, former smoker  Assessment  DMT2: 69y Male with DM T2 with hyperglycemia, A1C 7.6% , was on oral meds and insulin at home (Farxiga and Semglee basal insulin), now on basal bolus insulins, sugars still running high.  was started on steroids, having steroid induced hyperglycemias  CAD: on medications, stable, monitored.   HTN: on antihypertensive medications, monitored, asymptomatic.  CKD: Monitor labs/BMP, renal follow up. rhabdo, muscle bx eval       Discussed plan and management with Dr Flo Jamil NP - TEAMS  Myrna Rossi MD  Cell: [phone]  Office: [phone]              70 y/o male with hx of CAD s/p multiple  stents (most recent to pLM (70%), pLAD, mLAD on 1/8/24 with Dr. Dominguez), HFrEF , A-fib, HTN, HLD, T2DM,  CKD3, former smoker  Assessment  DMT2: 69y Male with DM T2 with hyperglycemia, A1C 7.6% , was on oral meds and insulin at home  (Farxiga and Semglee basal insulin), now on basal bolus insulins, sugars still running high.  was started on steroids, having steroid induced hyperglycemias  CAD: on medications, stable, monitored.   HTN: on antihypertensive medications, monitored, asymptomatic.  CKD: Monitor labs/BMP, renal follow up. rhabdo, muscle bx eval       Discussed plan and management with Dr Flo Jamil NP - TEAMS  Myrna Rossi MD  Cell: [phone]  Office: [phone]

## 2024-05-02 NOTE — PROGRESS NOTE ADULT - SUBJECTIVE AND OBJECTIVE BOX
KRISTY COYLE  [id number]    INTERVAL HPI/OVERNIGHT EVENTS: Pt says he is feeling improvement in strength. Had muscle biopsy this am. Was drowsy in am, but now improved. Denies fever, chills, chest pain, sob.     MEDICATIONS  (STANDING):  chlorhexidine 2% Cloths 1 Application(s) Topical daily  clopidogrel Tablet 75 milliGRAM(s) Oral daily  dextrose 10% Bolus 125 milliLiter(s) IV Bolus once  dextrose 5%. 1000 milliLiter(s) (100 mL/Hr) IV Continuous <Continuous>  dextrose 5%. 1000 milliLiter(s) (50 mL/Hr) IV Continuous <Continuous>  dextrose 50% Injectable 12.5 Gram(s) IV Push once  dextrose 50% Injectable 25 Gram(s) IV Push once  furosemide   Injectable 40 milliGRAM(s) IV Push daily  glucagon  Injectable 1 milliGRAM(s) IntraMuscular once  influenza  Vaccine (HIGH DOSE) 0.7 milliLiter(s) IntraMuscular once  insulin glargine Injectable (LANTUS) 34 Unit(s) SubCutaneous at bedtime  insulin lispro (ADMELOG) corrective regimen sliding scale   SubCutaneous three times a day before meals  insulin lispro (ADMELOG) corrective regimen sliding scale   SubCutaneous at bedtime  insulin lispro Injectable (ADMELOG) 15 Unit(s) SubCutaneous three times a day before meals  isosorbide   dinitrate Tablet (ISORDIL) 30 milliGRAM(s) Oral three times a day  methylPREDNISolone sodium succinate Injectable 20 milliGRAM(s) IV Push two times a day  metoprolol tartrate 25 milliGRAM(s) Oral two times a day  polyethylene glycol 3350 17 Gram(s) Oral daily  senna 2 Tablet(s) Oral at bedtime  silver sulfADIAZINE 1% Cream 1 Application(s) Topical daily  sodium chloride 0.9%. 1000 milliLiter(s) (75 mL/Hr) IV Continuous <Continuous>    MEDICATIONS  (PRN):  acetaminophen     Tablet .. 650 milliGRAM(s) Oral every 6 hours PRN Temp greater or equal to 38C (100.4F), Mild Pain (1 - 3)  dextrose Oral Gel 15 Gram(s) Oral once PRN Blood Glucose LESS THAN 70 milliGRAM(s)/deciliter      Allergies    atorvastatin (Muscle Pain (Severe))    Intolerances        Review of Systems: as above    Vital Signs Last 24 Hrs  T(C): 36.5 (02 May 2024 14:57), Max: 36.5 (02 May 2024 14:57)  T(F): 97.7 (02 May 2024 14:57), Max: 97.7 (02 May 2024 14:57)  HR: 91 (02 May 2024 14:57) (69 - 96)  BP: 118/68 (02 May 2024 14:57) (115/75 - 161/71)  BP(mean): 89 (02 May 2024 10:15) (87 - 104)  RR: 18 (02 May 2024 14:57) (16 - 18)  SpO2: 96% (02 May 2024 14:57) (96% - 100%)    Parameters below as of 02 May 2024 14:57  Patient On (Oxygen Delivery Method): room air        Physical Exam:  General: Breathing comfortably on room air, no distress  HEENT: EOMI, MMM, no oral ulcers  CVS: +S1/S2  Resp: CTA b/l  GI: Soft, NT/ND +BS  MSK: no synovitis. 5/5 strength in b/l shoulders, 5/5 distals arms/hands, 3/5 R thigh, 2/5 L thigh, 5/5 legs/feet  Skin: no visible rashes. Pitting leg/ankle/foot edema b/l    LABS:                        11.2   26.97 )-----------( 472      ( 02 May 2024 04:36 )             33.9     05-02    135  |  99  |  72<H>  ----------------------------<  288<H>  4.5   |  24  |  1.52<H>    Ca    9.0      02 May 2024 04:36  Phos  3.9     05-02  Mg     2.2     05-02    TPro  5.1<L>  /  Alb  2.7<L>  /  TBili  0.7  /  DBili  x   /  AST  597<H>  /  ALT  730<H>  /  AlkPhos  118  05-01    PT/INR - ( 02 May 2024 04:36 )   PT: 15.6 sec;   INR: 1.43 ratio         PTT - ( 02 May 2024 04:36 )  PTT:31.1 sec  Urinalysis Basic - ( 02 May 2024 04:36 )    Color: x / Appearance: x / SG: x / pH: x  Gluc: 288 mg/dL / Ketone: x  / Bili: x / Urobili: x   Blood: x / Protein: x / Nitrite: x   Leuk Esterase: x / RBC: x / WBC x   Sq Epi: x / Non Sq Epi: x / Bacteria: x        < from: MR Pelvis No Cont (04.29.24 @ 10:16) >  ACC: [id number] EXAM:  MR PELVIS   ORDERED BY: PRABHAKAR BISHOP     PROCEDURE DATE:  04/29/2024          INTERPRETATION:  MRI OF THE BONY PELVIS    CLINICAL INFORMATION: For evaluation of myositis.    COMPARISON: None available.    TECHNIQUE: Multiplanar, multisequence imaging of the bony pelvis and   bilateral thighs.    FINDINGS:    OSSEOUS: Focal marrow edema like signal surrounds a small nondisplaced   subchondral insufficiency fracture along left peripheral weightbearing   lateral femoral condyle. No osteonecrosis or aggressive osseous   neoplasm., osteonecrosis, or aggressive neoplasm. Moderate left worse   than right hip arthrosis without effusions. Sacroiliac joints and pubic   symphysis are maintained    GENERAL: Heterogeneous but grossly symmetric intramuscular edema like   signal of bilateral hip girdle and thigh musculature, most prominent   within the left anterior gluteus medius medius near its proximal iliac   origin as well as at the distal myotendinous junction of the tensor   fascial lary near its junction with the iliotibial band and relatively   sparing the bilateral quadriceps compartments. Distal radial   peritendinitis with trace reactive fluid within bilateral subacromial and   greater trochanteric bursae, but without discrete tear or tracy bursitis.    Small to moderate left larger than right patellar knee joint effusions.   No discrete mass lesion, hematoma, distended Baker's popliteal cyst, or   drainable encapsulated fluid collection. Common hamstring origins and   iliopsoas insertions are intact. No pelvic free fluid.    IMPRESSION:    1.  Nonspecific, heterogeneous but grossly symmetric patchy myositis of   bilateral hip girdle and thigh musculature. Consider muscle biopsy of   left proximal anterior gluteus medius or ipsilateral distal tensor   fascial lary.  2.  Small subacute nondisplaced subchondral insufficiency fracture of   left lateral femoral condyle.    --- End of Report ---            BETSY PETERSON MD; Attending Radiologist  This document has been electronically signed. Apr 29 2024 11:02AM    < end of copied text >    < from: CT Chest No Cont (04.29.24 @ 09:44) >    ACC: [id number] EXAM:  CT ABDOMEN AND PELVIS   ORDERED BY: PRABHAKAR BISHOP     ACC: [id number] EXAM:  CT CHEST   ORDERED BY: PRABHAKAR BISHOP     PROCEDURE DATE:  04/29/2024          INTERPRETATION:  CLINICAL INFORMATION: Elevated CK. Concern for   inflammatory myositis. Evaluate for malignancy.    COMPARISON: CT chest, abdomen, and pelvis 5/27/2023.    CONTRAST/COMPLICATIONS:  IV Contrast: NONE  Oral Contrast: NONE  Complications: None reported at time of study completion    PROCEDURE:  CT of the Chest, Abdomen and Pelvis was performed.  Sagittal and coronal reformats were performed.    FINDINGS:  Limited evaluation of the vasculature and viscera in the absence of   intravenous contrast.    CHEST:  LUNGS AND LARGE AIRWAYS: Patent central airways.Mild bilateral   subsegmental atelectasis. A 2 mm nodule in the lingula is unchanged   (series 4 image 283).  PLEURA: No pleural effusion.  VESSELS: Atherosclerotic changes. Coronary artery calcifications and   possibly stenting.  HEART: Heart size is normal. No pericardial effusion.  MEDIASTINUM AND JESUS: Small nonspecific mediastinal lymph nodes are   overall similar to the previous exam. For example, a right paratracheal   node measures 1.6 x 1.1 cm (series 3 image 52).  CHEST WALL AND LOWER NECK: Mild generalized subcutaneous edema.   Intramuscular lipoma in the the right latissimus dorsi measuring 1.3 x   0.8 cm.    ABDOMEN AND PELVIS:  LIVER: Within normal limits.  BILE DUCTS: Normal caliber.  GALLBLADDER: Within normal limits.  SPLEEN:Within normal limits.  PANCREAS: Within normal limits.  ADRENALS: Within normal limits.  KIDNEYS/URETERS: Within normal limits.    BLADDER: Within normal limits.  REPRODUCTIVE ORGANS: Mildly enlarged prostate.    BOWEL: No bowel obstruction.  PERITONEUM: Trace ascites. Mesenteric edema.  VESSELS: Atherosclerotic changes.  RETROPERITONEUM/LYMPH NODES: Small nonspecific lymph nodes in the central   mesentery are similar to prior. For example, a mesenteric node in the   left hemiabdomen measures 1.3x 0.9 cm (series 3 image 153). Presacral   edema.  ABDOMINAL WALL: Generalized subcutaneous edema. Small bilateral   fat-containing inguinal hernias.  BONES: Degenerative changes.    IMPRESSION:  *  The exam may be limited by noncontrast technique.  *  No evidence of malignancy within the chest, abdomen, or pelvis.  *  Generalized subcutaneous edema.  *  Trace ascites and mesenteric edema.        --- End of Report ---    Creatine Kinase, Serum (05.02.24 @ 04:36)   Creatine Kinase, Serum: 7646 U/L      Historical Values  Creatine Kinase, Serum (05.02.24 @ 04:36)   Creatine Kinase, Serum: 7646 U/L  Creatine Kinase, Serum (04.25.24 @ 19:07)   Creatine Kinase, Serum: 66726 U/L  Creatine Kinase, Serum (02.11.24 @ 13:19)   Creatine Kinase, Serum: 1318 U/L  Creatine Kinase, Serum (02.09.24 @ 11:16)   Creatine Kinase, Serum: 1574 U/L  Creatine Kinase, Serum (01.29.24 @ 00:56)   Creatine Kinase, Serum: 971 U/L  Creatine Kinase, Serum (01.05.24 @ 20:59)   Creatine Kinase, Serum: 144 U/L  Creatine Kinase, Serum (05.28.23 @ 06:06)   Creatine Kinase, Serum: 557: Moderate Hemolysis, results may be falsely elevated U/L  Creatine Kinase, Serum (09.23.19 @ 19:14)   Creatine Kinase, Serum: 324 U/L  Creatine Kinase, Serum (09.01.19 @ 15:12)   Creatine Kinase, Serum: 99 U/L     KRISTY COYLE  [id number]    INTERVAL HPI/OVERNIGHT EVENTS: Pt says he is feeling improvement in strength. Had muscle biopsy this am. Was drowsy in am, but now improved. Denies fever, chills, chest pain, sob.     MEDICATIONS  (STANDING):  chlorhexidine 2% Cloths 1 Application(s) Topical daily  clopidogrel Tablet 75 milliGRAM(s) Oral daily  dextrose 10% Bolus 125 milliLiter(s) IV Bolus once  dextrose 5%. 1000 milliLiter(s) (100 mL/Hr) IV Continuous <Continuous>  dextrose 5%. 1000 milliLiter(s) (50 mL/Hr) IV Continuous <Continuous>  dextrose 50% Injectable 12.5 Gram(s) IV Push once  dextrose 50% Injectable 25 Gram(s) IV Push once  furosemide   Injectable 40 milliGRAM(s) IV Push daily  glucagon  Injectable 1 milliGRAM(s) IntraMuscular once  influenza  Vaccine (HIGH DOSE) 0.7 milliLiter(s) IntraMuscular once  insulin glargine Injectable (LANTUS) 34 Unit(s) SubCutaneous at bedtime  insulin lispro (ADMELOG) corrective regimen sliding scale   SubCutaneous three times a day before meals  insulin lispro (ADMELOG) corrective regimen sliding scale   SubCutaneous at bedtime  insulin lispro Injectable (ADMELOG) 15 Unit(s) SubCutaneous three times a day before meals  isosorbide   dinitrate Tablet (ISORDIL) 30 milliGRAM(s) Oral three times a day  methylPREDNISolone sodium succinate Injectable 20 milliGRAM(s) IV Push two times a day  metoprolol tartrate 25 milliGRAM(s) Oral two times a day  polyethylene glycol 3350 17 Gram(s) Oral daily  senna 2 Tablet(s) Oral at bedtime  silver sulfADIAZINE 1% Cream 1 Application(s) Topical daily  sodium chloride 0.9%. 1000 milliLiter(s) (75 mL/Hr) IV Continuous <Continuous>    MEDICATIONS  (PRN):  acetaminophen     Tablet .. 650 milliGRAM(s) Oral every 6 hours PRN Temp greater or equal to 38C (100.4F), Mild Pain (1 - 3)  dextrose Oral Gel 15 Gram(s) Oral once PRN Blood Glucose LESS THAN 70 milliGRAM(s)/deciliter      Allergies    atorvastatin (Muscle Pain (Severe))    Intolerances        Review of Systems: as above    Vital Signs Last 24 Hrs  T(C): 36.5 (02 May 2024 14:57), Max: 36.5 (02 May 2024 14:57)  T(F): 97.7 (02 May 2024 14:57), Max: 97.7 (02 May 2024 14:57)  HR: 91 (02 May 2024 14:57) (69 - 96)  BP: 118/68 (02 May 2024 14:57) (115/75 - 161/71)  BP(mean): 89 (02 May 2024 10:15) (87 - 104)  RR: 18 (02 May 2024 14:57) (16 - 18)  SpO2: 96% (02 May 2024 14:57) (96% - 100%)    Parameters below as of 02 May 2024 14:57  Patient On (Oxygen Delivery Method): room air        Physical Exam:  General: Breathing comfortably on room air, no distress  HEENT: EOMI  CVS: +S1/S2  Resp: CTA b/l  GI: Soft, NT/ND +BS  MSK: no synovitis. 5/5 strength in b/l shoulders, 5/5 distals arms/hands, 3/5 R thigh, 2/5 L thigh, 5/5 distal legs/feet  Skin: no visible rashes. Pitting leg/ankle/foot edema b/l    LABS:                        11.2   26.97 )-----------( 472      ( 02 May 2024 04:36 )             33.9     05-02    135  |  99  |  72<H>  ----------------------------<  288<H>  4.5   |  24  |  1.52<H>    Ca    9.0      02 May 2024 04:36  Phos  3.9     05-02  Mg     2.2     05-02    TPro  5.1<L>  /  Alb  2.7<L>  /  TBili  0.7  /  DBili  x   /  AST  597<H>  /  ALT  730<H>  /  AlkPhos  118  05-01    PT/INR - ( 02 May 2024 04:36 )   PT: 15.6 sec;   INR: 1.43 ratio         PTT - ( 02 May 2024 04:36 )  PTT:31.1 sec  Urinalysis Basic - ( 02 May 2024 04:36 )    Color: x / Appearance: x / SG: x / pH: x  Gluc: 288 mg/dL / Ketone: x  / Bili: x / Urobili: x   Blood: x / Protein: x / Nitrite: x   Leuk Esterase: x / RBC: x / WBC x   Sq Epi: x / Non Sq Epi: x / Bacteria: x        < from: MR Pelvis No Cont (04.29.24 @ 10:16) >  ACC: [id number] EXAM:  MR PELVIS   ORDERED BY: PRABHAKAR BISHOP     PROCEDURE DATE:  04/29/2024          INTERPRETATION:  MRI OF THE BONY PELVIS    CLINICAL INFORMATION: For evaluation of myositis.    COMPARISON: None available.    TECHNIQUE: Multiplanar, multisequence imaging of the bony pelvis and   bilateral thighs.    FINDINGS:    OSSEOUS: Focal marrow edema like signal surrounds a small nondisplaced   subchondral insufficiency fracture along left peripheral weightbearing   lateral femoral condyle. No osteonecrosis or aggressive osseous   neoplasm., osteonecrosis, or aggressive neoplasm. Moderate left worse   than right hip arthrosis without effusions. Sacroiliac joints and pubic   symphysis are maintained    GENERAL: Heterogeneous but grossly symmetric intramuscular edema like   signal of bilateral hip girdle and thigh musculature, most prominent   within the left anterior gluteus medius medius near its proximal iliac   origin as well as at the distal myotendinous junction of the tensor   fascial lary near its junction with the iliotibial band and relatively   sparing the bilateral quadriceps compartments. Distal radial   peritendinitis with trace reactive fluid within bilateral subacromial and   greater trochanteric bursae, but without discrete tear or tracy bursitis.    Small to moderate left larger than right patellar knee joint effusions.   No discrete mass lesion, hematoma, distended Baker's popliteal cyst, or   drainable encapsulated fluid collection. Common hamstring origins and   iliopsoas insertions are intact. No pelvic free fluid.    IMPRESSION:    1.  Nonspecific, heterogeneous but grossly symmetric patchy myositis of   bilateral hip girdle and thigh musculature. Consider muscle biopsy of   left proximal anterior gluteus medius or ipsilateral distal tensor   fascial lary.  2.  Small subacute nondisplaced subchondral insufficiency fracture of   left lateral femoral condyle.    --- End of Report ---            BETSY PETERSON MD; Attending Radiologist  This document has been electronically signed. Apr 29 2024 11:02AM    < end of copied text >    < from: CT Chest No Cont (04.29.24 @ 09:44) >    ACC: [id number] EXAM:  CT ABDOMEN AND PELVIS   ORDERED BY: PRABHAKAR BISHOP     ACC: [id number] EXAM:  CT CHEST   ORDERED BY: PRABHAKAR BISHOP     PROCEDURE DATE:  04/29/2024          INTERPRETATION:  CLINICAL INFORMATION: Elevated CK. Concern for   inflammatory myositis. Evaluate for malignancy.    COMPARISON: CT chest, abdomen, and pelvis 5/27/2023.    CONTRAST/COMPLICATIONS:  IV Contrast: NONE  Oral Contrast: NONE  Complications: None reported at time of study completion    PROCEDURE:  CT of the Chest, Abdomen and Pelvis was performed.  Sagittal and coronal reformats were performed.    FINDINGS:  Limited evaluation of the vasculature and viscera in the absence of   intravenous contrast.    CHEST:  LUNGS AND LARGE AIRWAYS: Patent central airways.Mild bilateral   subsegmental atelectasis. A 2 mm nodule in the lingula is unchanged   (series 4 image 283).  PLEURA: No pleural effusion.  VESSELS: Atherosclerotic changes. Coronary artery calcifications and   possibly stenting.  HEART: Heart size is normal. No pericardial effusion.  MEDIASTINUM AND JESUS: Small nonspecific mediastinal lymph nodes are   overall similar to the previous exam. For example, a right paratracheal   node measures 1.6 x 1.1 cm (series 3 image 52).  CHEST WALL AND LOWER NECK: Mild generalized subcutaneous edema.   Intramuscular lipoma in the the right latissimus dorsi measuring 1.3 x   0.8 cm.    ABDOMEN AND PELVIS:  LIVER: Within normal limits.  BILE DUCTS: Normal caliber.  GALLBLADDER: Within normal limits.  SPLEEN:Within normal limits.  PANCREAS: Within normal limits.  ADRENALS: Within normal limits.  KIDNEYS/URETERS: Within normal limits.    BLADDER: Within normal limits.  REPRODUCTIVE ORGANS: Mildly enlarged prostate.    BOWEL: No bowel obstruction.  PERITONEUM: Trace ascites. Mesenteric edema.  VESSELS: Atherosclerotic changes.  RETROPERITONEUM/LYMPH NODES: Small nonspecific lymph nodes in the central   mesentery are similar to prior. For example, a mesenteric node in the   left hemiabdomen measures 1.3x 0.9 cm (series 3 image 153). Presacral   edema.  ABDOMINAL WALL: Generalized subcutaneous edema. Small bilateral   fat-containing inguinal hernias.  BONES: Degenerative changes.    IMPRESSION:  *  The exam may be limited by noncontrast technique.  *  No evidence of malignancy within the chest, abdomen, or pelvis.  *  Generalized subcutaneous edema.  *  Trace ascites and mesenteric edema.        --- End of Report ---    Creatine Kinase, Serum (05.02.24 @ 04:36)   Creatine Kinase, Serum: 7646 U/L      Historical Values  Creatine Kinase, Serum (05.02.24 @ 04:36)   Creatine Kinase, Serum: 7646 U/L  Creatine Kinase, Serum (04.25.24 @ 19:07)   Creatine Kinase, Serum: 44792 U/L  Creatine Kinase, Serum (02.11.24 @ 13:19)   Creatine Kinase, Serum: 1318 U/L  Creatine Kinase, Serum (02.09.24 @ 11:16)   Creatine Kinase, Serum: 1574 U/L  Creatine Kinase, Serum (01.29.24 @ 00:56)   Creatine Kinase, Serum: 971 U/L  Creatine Kinase, Serum (01.05.24 @ 20:59)   Creatine Kinase, Serum: 144 U/L  Creatine Kinase, Serum (05.28.23 @ 06:06)   Creatine Kinase, Serum: 557: Moderate Hemolysis, results may be falsely elevated U/L  Creatine Kinase, Serum (09.23.19 @ 19:14)   Creatine Kinase, Serum: 324 U/L  Creatine Kinase, Serum (09.01.19 @ 15:12)   Creatine Kinase, Serum: 99 U/L

## 2024-05-02 NOTE — PROGRESS NOTE ADULT - SUBJECTIVE AND OBJECTIVE BOX
date of service: 05-02-24 @ 09:28  afebrile  REVIEW OF SYSTEMS:  CONSTITUTIONAL: No fever,  no  weight loss  ENT:  No  tinnitus,   no   vertigo  NECK: No pain or stiffness  RESPIRATORY: No cough, wheezing, chills or hemoptysis;    No Shortness of Breath  CARDIOVASCULAR: No chest pain, palpitations, dizziness  GASTROINTESTINAL: No abdominal or epigastric pain. No nausea, vomiting, or hematemesis; No diarrhea  No melena or hematochezia.  GENITOURINARY: No dysuria, frequency, hematuria, or incontinence  NEUROLOGICAL: No headaches  SKIN: No itching,  no   rash  LYMPH Nodes: No enlarged glands  ENDOCRINE: No heat or cold intolerance  MUSCULOSKELETAL: No joint pain or swelling  PSYCHIATRIC: No depression, anxiety  HEME/LYMPH: No easy bruising, or bleeding gums  ALLERGY AND IMMUNOLOGIC: No hives or eczema	    MEDICATIONS  (STANDING):  influenza  Vaccine (HIGH DOSE) 0.7 milliLiter(s) IntraMuscular once    MEDICATIONS  (PRN):      Vital Signs Last 24 Hrs  T(C): 36.4 (02 May 2024 07:24), Max: 36.5 (01 May 2024 11:02)  T(F): 97.6 (02 May 2024 04:23), Max: 97.7 (01 May 2024 11:02)  HR: 69 (02 May 2024 07:24) (69 - 96)  BP: 119/73 (02 May 2024 07:24) (115/75 - 162/79)  BP(mean): --  RR: 18 (02 May 2024 07:24) (18 - 18)  SpO2: 99% (02 May 2024 07:24) (97% - 99%)    Parameters below as of 02 May 2024 04:23  Patient On (Oxygen Delivery Method): room air      CAPILLARY BLOOD GLUCOSE      POCT Blood Glucose.: 183 mg/dL (01 May 2024 21:43)  POCT Blood Glucose.: 186 mg/dL (01 May 2024 16:56)  POCT Blood Glucose.: 267 mg/dL (01 May 2024 12:45)  POCT Blood Glucose.: 245 mg/dL (01 May 2024 11:36)    I&O's Summary    01 May 2024 07:01  -  02 May 2024 07:00  --------------------------------------------------------  IN: 780 mL / OUT: 3900 mL / NET: -3120 mL          Appearance: Normal	  HEENT:   Normal oral mucosa, PERRL, EOMI	  Lymphatic: No lymphadenopathy  Cardiovascular: Normal S1 S2, No JVD  Respiratory: Lungs clear to auscultation	  Gastrointestinal:  Soft, Non-tender, + BS	  Skin: No rash, No ecchymoses	  Extremities:   edema.  b/l    LABS:                        11.2   26.97 )-----------( 472      ( 02 May 2024 04:36 )             33.9     05-02    135  |  99  |  72<H>  ----------------------------<  288<H>  4.5   |  24  |  1.52<H>    Ca    9.0      02 May 2024 04:36  Phos  3.9     05-02  Mg     2.2     05-02    TPro  5.1<L>  /  Alb  2.7<L>  /  TBili  0.7  /  DBili  x   /  AST  597<H>  /  ALT  730<H>  /  AlkPhos  118  05-01    PT/INR - ( 02 May 2024 04:36 )   PT: 15.6 sec;   INR: 1.43 ratio         PTT - ( 02 May 2024 04:36 )  PTT:31.1 sec  CARDIAC MARKERS ( 02 May 2024 04:36 )  x     / x     / 7646 U/L / x     / x      CARDIAC MARKERS ( 01 May 2024 08:25 )  x     / x     / 7488 U/L / x     / x          Urinalysis Basic - ( 02 May 2024 04:36 )    Color: x / Appearance: x / SG: x / pH: x  Gluc: 288 mg/dL / Ketone: x  / Bili: x / Urobili: x   Blood: x / Protein: x / Nitrite: x   Leuk Esterase: x / RBC: x / WBC x   Sq Epi: x / Non Sq Epi: x / Bacteria: x              Thyroid Stimulating Hormone, Serum: 1.76 uIU/mL (02-10 @ 07:24)          Consultant(s) Notes Reviewed:      Care Discussed with Consultants/Other Providers:

## 2024-05-02 NOTE — PRE-ANESTHESIA EVALUATION ADULT - NSATTENDATTESTRD_GEN_ALL_CORE
The patient has been re-examined and I agree with the above assessment or I updated with my findings.
3-point gait

## 2024-05-02 NOTE — PROGRESS NOTE ADULT - ASSESSMENT
68yo M w/ PMH of HTN, HLD, DM2, CAD s/p 10 stents (last 1 week ago), Afib on Eliquis, HFrEF, CKD III pw LE pain/cramping.  On 04/16-04/19 admitted for NSTEMI s/p C w/ stent placement. 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 Holding Eliquis Maintain Plavix. Restart Eliquis post bx when deemed safe from Rheum/surgical standpoint  - will need PCSK9 inhibitor for hypercholesterolemia. Can never be on statin again CPK [phone] yesterday Now [phone]

## 2024-05-02 NOTE — PROGRESS NOTE ADULT - SUBJECTIVE AND OBJECTIVE BOX
SUBJECTIVE:  NO c/o CP nor SOB. Off the floor for muscle bx    MEDICATIONS:  furosemide   Injectable 40 milliGRAM(s) IV Push daily  isosorbide   dinitrate Tablet (ISORDIL) 30 milliGRAM(s) Oral three times a day  metoprolol tartrate 25 milliGRAM(s) Oral two times a day        acetaminophen     Tablet .. 650 milliGRAM(s) Oral every 6 hours PRN    polyethylene glycol 3350 17 Gram(s) Oral daily  senna 2 Tablet(s) Oral at bedtime    dextrose 50% Injectable 12.5 Gram(s) IV Push once  dextrose 50% Injectable 25 Gram(s) IV Push once  dextrose Oral Gel 15 Gram(s) Oral once PRN  glucagon  Injectable 1 milliGRAM(s) IntraMuscular once  insulin glargine Injectable (LANTUS) 34 Unit(s) SubCutaneous at bedtime  insulin lispro (ADMELOG) corrective regimen sliding scale   SubCutaneous three times a day before meals  insulin lispro (ADMELOG) corrective regimen sliding scale   SubCutaneous at bedtime  insulin lispro Injectable (ADMELOG) 15 Unit(s) SubCutaneous three times a day before meals  methylPREDNISolone sodium succinate Injectable 20 milliGRAM(s) IV Push two times a day    chlorhexidine 2% Cloths 1 Application(s) Topical daily  clopidogrel Tablet 75 milliGRAM(s) Oral daily  dextrose 10% Bolus 125 milliLiter(s) IV Bolus once  dextrose 5%. 1000 milliLiter(s) IV Continuous <Continuous>  dextrose 5%. 1000 milliLiter(s) IV Continuous <Continuous>  influenza  Vaccine (HIGH DOSE) 0.7 milliLiter(s) IntraMuscular once  silver sulfADIAZINE 1% Cream 1 Application(s) Topical daily      REVIEW OF SYSTEMS:    CONSTITUTIONAL: No fever, weight loss, or fatigue  EYES: No eye pain, visual disturbances, or discharge  NECK: No pain or stiffness  RESPIRATORY: No cough, wheezing, chills or hemoptysis; No Shortness of Breath  CARDIOVASCULAR: No chest pain, palpitations, dizziness, or leg swelling  GASTROINTESTINAL: No abdominal or epigastric pain. No nausea, vomiting, or hematemesis; No diarrhea or constipation. No melena or hematochezia.  GENITOURINARY: No dysuria, frequency, hematuria, or incontinence  NEUROLOGICAL: No headaches, memory loss, loss of strength, numbness, or tremors  SKIN: No itching, burning, rashes, or lesions   LYMPH Nodes: No enlarged glands  MUSCULOSKELETAL: No joint pain or swelling; No muscle, back, or extremity pain  All other review of systems are negative.  	  [ ] Unable to obtain    PHYSICAL EXAM:  T(C): 36.4 (05-02-24 @ 07:24), Max: 36.5 (05-01-24 @ 11:02)  HR: 69 (05-02-24 @ 07:24) (69 - 96)  BP: 119/73 (05-02-24 @ 07:24) (115/75 - 162/79)  RR: 18 (05-02-24 @ 07:24) (18 - 18)  SpO2: 99% (05-02-24 @ 07:24) (97% - 99%)  Wt(kg): --  I&O's Summary    01 May 2024 07:01  -  02 May 2024 07:00  --------------------------------------------------------  IN: 780 mL / OUT: 3900 mL / NET: -3120 mL      Height (cm): 167.6 (05-02 @ 07:24)  Weight (kg): 59 (05-02 @ 07:24)  BMI (kg/m2): 21 (05-02 @ 07:24)  BSA (m2): 1.67 (05-02 @ 07:24)    PHYSICAL EXAM  Not performed - off the floor    TELEMETRY: 	AF 70-80 PVc no events    LABS:	 	                        11.2   26.97 )-----------( 472      ( 02 May 2024 04:36 )             33.9     05-02    135  |  99  |  72<H>  ----------------------------<  288<H>  4.5   |  24  |  1.52<H>    Ca    9.0      02 May 2024 04:36  Phos  3.9     05-02  Mg     2.2     05-02    TPro  5.1<L>  /  Alb  2.7<L>  /  TBili  0.7  /  DBili  x   /  AST  597<H>  /  ALT  730<H>  /  AlkPhos  118  05-01

## 2024-05-02 NOTE — PROGRESS NOTE ADULT - SUBJECTIVE AND OBJECTIVE BOX
Overnight events noted      VITAL:  T(C): , Max: 36.5 (05-01-24 @ 11:02)  T(F): , Max: 97.7 (05-01-24 @ 11:02)  HR: 69 (05-02-24 @ 07:24)  BP: 119/73 (05-02-24 @ 07:24)  BP(mean): --  RR: 18 (05-02-24 @ 07:24)  SpO2: 99% (05-02-24 @ 07:24)  Wt(kg): --      PHYSICAL EXAM:  Constitutional: alert, NAD  HEENT: NCAT, MMM  Neck: Supple, No JVD  Respiratory: CTA-b/l  Cardiovascular: RRR s1s2, no m/r/g  Gastrointestinal: BS+, soft, NT/ND  Extremities: (+)b/l LE edema/mild tenderness  Neurological: no focal deficits; strength grossly intact  Back: no CVAT b/l  Skin: No rashes, no nevi    LABS:                        11.2   26.97 )-----------( 472      ( 02 May 2024 04:36 )             33.9     Na(135)/K(4.5)/Cl(99)/HCO3(24)/BUN(72)/Cr(1.52)Glu(288)/Ca(9.0)/Mg(2.2)/PO4(3.9)    05-02 @ 04:36  Na(137)/K(4.5)/Cl(103)/HCO3(22)/BUN(70)/Cr(1.38)Glu(178)/Ca(9.2)/Mg(--)/PO4(--)    05-01 @ 08:25  Na(135)/K(4.7)/Cl(102)/HCO3(21)/BUN(73)/Cr(1.56)Glu(273)/Ca(8.3)/Mg(2.2)/PO4(--)    04-30 @ 06:07    CPK 7646<==7488      IMPRESSION: 69M w/ HTN, DM2, CAD, AFib, HFrEF, and CKD, s/p recent PCI, 4/25/24 returned with acute rhabdomyolysis    (1)Renal - CKD3-4 - due to diabetes/hypertension - at/near baseline  (2)Rhabdo - had been resolving; CPK plateaued in 7000s today, s/p d/c of IVF yesterday; we should reinstate IVF  (3)Metabolic acidosis - improved  (4)Rheum - 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 - Surgery and Rheum on board- potentially for muscle biopsy today    RECOMMEND:  (1)Reinitiate IVF - NS 75cc/h   (2)Lasix 40mg iv daily as ordered  (3)F/U BMP+Mg+CPK daily  (4)Muscle biopsy today as planned                Markell Walton MD  [facility]  Office/on call physician: [phone]  Cell (7a-7p): [phone]

## 2024-05-02 NOTE — PROGRESS NOTE ADULT - SUBJECTIVE AND OBJECTIVE BOX
Chief complaint  Patient is a 69y old  Male who presents with a chief complaint of Rhabdo (02 May 2024 12:28)         Labs and Fingersticks  CAPILLARY BLOOD GLUCOSE      POCT Blood Glucose.: 275 mg/dL (02 May 2024 11:46)  POCT Blood Glucose.: 233 mg/dL (02 May 2024 09:35)  POCT Blood Glucose.: 183 mg/dL (01 May 2024 21:43)  POCT Blood Glucose.: 186 mg/dL (01 May 2024 16:56)      Anion Gap: 12 (05-02 @ 04:36)  Anion Gap: 12 (05-01 @ 08:25)      Calcium: 9.0 (05-02 @ 04:36)  Calcium: 9.2 (05-01 @ 08:25)  Albumin: 2.7 *L* (05-01 @ 08:25)    Alanine Aminotransferase (ALT/SGPT): 730 *H* (05-01 @ 08:25)  Alkaline Phosphatase: 118 (05-01 @ 08:25)  Aspartate Aminotransferase (AST/SGOT): 597 *H* (05-01 @ 08:25)        05-02    135  |  99  |  72<H>  ----------------------------<  288<H>  4.5   |  24  |  1.52<H>    Ca    9.0      02 May 2024 04:36  Phos  3.9     05-02  Mg     2.2     05-02    TPro  5.1<L>  /  Alb  2.7<L>  /  TBili  0.7  /  DBili  x   /  AST  597<H>  /  ALT  730<H>  /  AlkPhos  118  05-01                        11.2   26.97 )-----------( 472      ( 02 May 2024 04:36 )             33.9     Medications  MEDICATIONS  (STANDING):  chlorhexidine 2% Cloths 1 Application(s) Topical daily  clopidogrel Tablet 75 milliGRAM(s) Oral daily  dextrose 10% Bolus 125 milliLiter(s) IV Bolus once  dextrose 5%. 1000 milliLiter(s) (50 mL/Hr) IV Continuous <Continuous>  dextrose 5%. 1000 milliLiter(s) (100 mL/Hr) IV Continuous <Continuous>  dextrose 50% Injectable 25 Gram(s) IV Push once  dextrose 50% Injectable 12.5 Gram(s) IV Push once  furosemide   Injectable 40 milliGRAM(s) IV Push daily  glucagon  Injectable 1 milliGRAM(s) IntraMuscular once  influenza  Vaccine (HIGH DOSE) 0.7 milliLiter(s) IntraMuscular once  insulin glargine Injectable (LANTUS) 34 Unit(s) SubCutaneous at bedtime  insulin lispro (ADMELOG) corrective regimen sliding scale   SubCutaneous three times a day before meals  insulin lispro (ADMELOG) corrective regimen sliding scale   SubCutaneous at bedtime  insulin lispro Injectable (ADMELOG) 15 Unit(s) SubCutaneous three times a day before meals  isosorbide   dinitrate Tablet (ISORDIL) 30 milliGRAM(s) Oral three times a day  methylPREDNISolone sodium succinate Injectable 20 milliGRAM(s) IV Push two times a day  metoprolol tartrate 25 milliGRAM(s) Oral two times a day  polyethylene glycol 3350 17 Gram(s) Oral daily  senna 2 Tablet(s) Oral at bedtime  silver sulfADIAZINE 1% Cream 1 Application(s) Topical daily      Physical Exam  General: Patient comfortable in bed   Vital Signs Last 12 Hrs  T(F): 97.2 (05-02-24 @ 10:00), Max: 97.6 (05-02-24 @ 04:23)  HR: 76 (05-02-24 @ 10:15) (69 - 84)  BP: 126/63 (05-02-24 @ 10:15) (119/73 - 161/71)  BP(mean): 89 (05-02-24 @ 10:15) (87 - 104)  RR: 17 (05-02-24 @ 10:15) (16 - 18)  SpO2: 96% (05-02-24 @ 10:15) (96% - 100%)    CVS: S1S2   Respiratory: No wheezing, no crepitations  GI: Abdomen soft, bowel sounds positive  Musculoskeletal:  moves all extremities  : Voiding         Chief complaint  Patient is a 69y old  Male who presents with a chief complaint of  Rhabdo (02 May 2024 12:28)         Labs and Fingersticks  CAPILLARY BLOOD GLUCOSE      POCT Blood Glucose.: 275 mg/dL (02 May 2024 11:46)  POCT Blood Glucose.: 233 mg/dL (02 May 2024 09:35)  POCT Blood Glucose.: 183 mg/dL (01 May 2024 21:43)  POCT Blood Glucose.: 186 mg/dL (01 May 2024 16:56)      Anion Gap: 12 (05-02 @ 04:36)  Anion Gap: 12 (05-01 @ 08:25)      Calcium: 9.0 (05-02 @ 04:36)  Calcium: 9.2 (05-01 @ 08:25)  Albumin: 2.7 *L* (05-01 @ 08:25)    Alanine Aminotransferase (ALT/SGPT): 730 *H* (05-01 @ 08:25)  Alkaline Phosphatase: 118 (05-01 @ 08:25)  Aspartate Aminotransferase (AST/SGOT): 597 *H* (05-01 @ 08:25)        05-02    135  |  99  |  72<H>  ----------------------------<  288<H>  4.5   |  24  |  1.52<H>    Ca    9.0      02 May 2024 04:36  Phos  3.9     05-02  Mg     2.2     05-02    TPro  5.1<L>  /  Alb  2.7<L>  /  TBili  0.7  /  DBili  x   /  AST  597<H>  /  ALT  730<H>  /  AlkPhos  118  05-01                        11.2   26.97 )-----------( 472      ( 02 May 2024 04:36 )             33.9     Medications  MEDICATIONS  (STANDING):  chlorhexidine 2% Cloths 1 Application(s) Topical daily  clopidogrel Tablet 75 milliGRAM(s) Oral daily  dextrose 10% Bolus 125 milliLiter(s) IV Bolus once  dextrose 5%. 1000 milliLiter(s) (50 mL/Hr) IV Continuous <Continuous>  dextrose 5%. 1000 milliLiter(s) (100 mL/Hr) IV Continuous <Continuous>  dextrose 50% Injectable 25 Gram(s) IV Push once  dextrose 50% Injectable 12.5 Gram(s) IV Push once  furosemide   Injectable 40 milliGRAM(s) IV Push daily  glucagon  Injectable 1 milliGRAM(s) IntraMuscular once  influenza  Vaccine (HIGH DOSE) 0.7 milliLiter(s) IntraMuscular once  insulin glargine Injectable (LANTUS) 34 Unit(s) SubCutaneous at bedtime  insulin lispro (ADMELOG) corrective regimen sliding scale   SubCutaneous three times a day before meals  insulin lispro (ADMELOG) corrective regimen sliding scale   SubCutaneous at bedtime  insulin lispro Injectable (ADMELOG) 15 Unit(s) SubCutaneous three times a day before meals  isosorbide   dinitrate Tablet (ISORDIL) 30 milliGRAM(s) Oral three times a day  methylPREDNISolone sodium succinate Injectable 20 milliGRAM(s) IV Push two times a day  metoprolol tartrate 25 milliGRAM(s) Oral two times a day  polyethylene glycol 3350 17 Gram(s) Oral daily  senna 2 Tablet(s) Oral at bedtime  silver sulfADIAZINE 1% Cream 1 Application(s) Topical daily      Physical Exam  General: Patient comfortable in bed   Vital Signs Last 12 Hrs  T(F): 97.2 (05-02-24 @ 10:00), Max: 97.6 (05-02-24 @ 04:23)  HR: 76 (05-02-24 @ 10:15) (69 - 84)  BP: 126/63 (05-02-24 @ 10:15) (119/73 - 161/71)  BP(mean): 89 (05-02-24 @ 10:15) (87 - 104)  RR: 17 (05-02-24 @ 10:15) (16 - 18)  SpO2: 96% (05-02-24 @ 10:15) (96% - 100%)    CVS: S1S2   Respiratory: No wheezing, no crepitations  GI: Abdomen soft, bowel sounds positive  Musculoskeletal:  moves all extremities  : Voiding

## 2024-05-02 NOTE — BRIEF OPERATIVE NOTE - OPERATION/FINDINGS
Right thigh posterior incision made. Biceps femoris muscle identified. [id number] sample taken. Fascia and skin closed.

## 2024-05-02 NOTE — BRIEF OPERATIVE NOTE - NSICDXBRIEFPROCEDURE_GEN_ALL_CORE_FT
PROCEDURES:  Biopsy, superficial muscle, lower extremity 02-May-2024 09:31:18 Right posterior, biceps femoris Noy Crawford

## 2024-05-02 NOTE — CHART NOTE - NSCHARTNOTEFT_GEN_A_CORE
Post Operative Note  Patient: KRISTY COYLE 69y (1955) Male   MRN: [id number]  Location: [facility] 4MON 412 D1  Visit: 04-25-24 Inpatient    Procedure: S/P right posterior thigh/biceps femoris muscle biopsy    Subjective: Patient seen and examined post operatively. Reports pain as controlled, with only some discomfort when getting up from bed. Denies sensory changes to the RLE.      Objective:  Vitals: T(F): 97.7 (05-02-24 @ 14:57), Max: 97.7 (05-02-24 @ 14:57)  HR: 91 (05-02-24 @ 14:57)  BP: 118/68 (05-02-24 @ 14:57) (115/75 - 161/71)  RR: 18 (05-02-24 @ 14:57)  SpO2: 96% (05-02-24 @ 14:57)    Physical Examination:  General: NAD, sitting comfortably in the chair  HEENT: Normocephalic atraumatic  Respiratory: Nonlabored respirations, normal CW expansion, on RA  Cardio: S1S2, regular rate and rhythm  Vascular: extremities are warm and well perfused, right posterior thigh incision soft w/o hematoma, dressing with minimal serosang strikethrough    Imaging:  No post-op imaging studies    Assessment:  69y Male patient S/P right posterior thigh/biceps femoris muscle biopsy for rhabdomyolysis work up. Patient tolerated procedure and is doing well.     Plan:  - Restart eliquis POD2 (5/4/24)  - Pain control PRN  - Diet: Regular  - Activity: OOBAT  - DVT ppx: SCD's  - Excellent care per primary      Trauma/ACS  [id number]

## 2024-05-02 NOTE — PRE-ANESTHESIA EVALUATION ADULT - NSANTHPMHFT_GEN_ALL_CORE
adm for rhabdomyolysis  s/p NSTEMI , PTCA X 1 4/16/24 ON PLAVIX  TOTAL PTCA X 10  ALB-2.7  ELEVATED LFT

## 2024-05-02 NOTE — PROGRESS NOTE ADULT - ASSESSMENT
68 y/o man      h/o severe CAD S/P multiple PCI's including multiple LM and LAD stents.       +A-fib, HTN, HLD, T2DM, CKD3, former smoker     presents with  weaknss/   leg  pains.  no leg  weakness      + troponin /  elevated  cpk      CAD,  has   10  stents/  HTN/  HLD        cath,   4/18,  2.0 mm caliber TITO to distal LAD,Dr ILDA Dominguez        on  dapt/  ef   47     troponin,  likely   demand  ischemia.  not  mi  per  dr arellano,,  not a  candidate   for  further intervention,   small  caliber  vessels   c/c  Afib,  on eliquis      DM,  follow  fs/  endo  dr durant   CKD    rhabdomyolysis,/  elevated  CPK,    suspect  ?  statin induced.   and   statin  on hold    elevated lfts,   abd  u/s,  normal liver    doppler  legs.  with PAD/  will  f/p  a s an out pt with vascular. when stable   on asa/  eliquis, plavix, / lasix, imdur.  valsartan.  toprol      hold   Farxiga   and  valsartan for  now,  iv fluids,  per  renal   c/c  anemia     elevated  wbc.  afebrile,  doubt  infectious. and  no ab per  ID       rhabdomyolysis/  form drug vs  polymyositis,    need  to  r/o  polymyositis, vs  drug induced   myositis,    mri  pelvis,  myositis/  fx  femoral condyle.  ortho  eval  noted,  rec immobilizer  pt refusing    ct a/p, neck, no  malignancy   await   bx   by   surg dr adam palomino/  on iv  steroid  per   rheum       recent   stent/on  4/17/24.   only   option , to   temporarily hold  eliquis  and   continue   plavix,   and   card  dr bravo and  dr brijesh dominguez in  agreement    wbc  noted, from steroid,  cpk/  lfts decreasing     pedal  edema   blister,  dorsum  foot/ on iv lasix/  suspect  from iv fluids  and  from   large dose of steroids   muscle  bx,  today/  follwo path   restart  eliquis  / defer steroid  to rheum       rad< from: TTE W or WO Ultrasound Enhancing Agent (01.29.24 @ 18:15) >  ONCLUSIONS:   1. Left ventricular cavity is small. Left ventricular wall thickness is normal. Left ventricular systolic function is normal with an ejection fraction of 47 % by Ulloa's method of disks.   2. Unable to evaluate wall motion due to poor image quality. There is possible apical hypokinesis.   3. There is severe (grade 3) left ventricular diastolic dysfunction, with elevated filling pressure.   4. Normal right ventricular cavity size and normalsystolic function.   5. There is mild tricuspid regurgitation. Estimated pulmonary artery systolic pressure is 23 mmHg.   6. Compared to the transthoracic echocardiogram performed on 11/26/2023, there have been no significant interval changes.  ____________________________________________________________________  Recommendations:  If clincially indicated, patient should return for repeat echocardiogram with contrast agent, for enhanced left ventricular opacification and improved delineation of the left ventricular endocardial borders. Would suggest a limited study with UEA/ contrast for better delineation of wall motion abnormalities.  ________________________________________________________________________________________  FINDINGS:  < end of copied text >         rad    -  68 y/o man      h/o severe CAD S/P multiple PCI's including multiple LM and LAD stents.       +A-fib, HTN, HLD, T2DM, CKD3, former smoker     presents with  weaknss/   leg  pains.  no leg  weakness      + troponin /  elevated  cpk      CAD,  has   10  stents/  HTN/  HLD        cath,   4/18,  2.0 mm caliber TITO to distal LAD,Dr ILDA Dominguez        on  dapt/  ef   47     troponin,  likely   demand  ischemia.  not  mi  per  dr arellano,,  not a  candidate   for  further intervention,   small  caliber  vessels   c/c  Afib,  on eliquis      DM,  follow  fs/  endo  dr durant   CKD    rhabdomyolysis,/  elevated  CPK,    suspect  ?  statin induced.   and   statin  on hold    elevated lfts,   abd  u/s,  normal liver    doppler  legs.  with PAD/  will  f/p  a s an out pt with vascular. when stable   on asa/  eliquis, plavix, / lasix, imdur.  valsartan.  toprol      hold   Farxiga   and  valsartan for  now,  iv fluids,  per  renal   c/c  anemia     elevated  wbc.  afebrile,  doubt  infectious. and  no ab per  ID       rhabdomyolysis/  form drug vs  polymyositis,    need  to  r/o  polymyositis, vs  drug induced   myositis,    mri  pelvis,  myositis/  fx  femoral condyle.  ortho  eval  noted,  rec immobilizer  pt refusing    ct a/p, neck, no  malignancy   await   bx   by   surg dr adam palomino/  on iv  steroid  per   rheum       recent   stent/on  4/17/24.   only   option , to   temporarily hold  eliquis  and   continue   plavix,   and   card  dr bravo and  dr brijesh dominguez in  agreement    wbc  noted, from steroid,  cpk/  lfts decreasing     pedal  edema   blister,  dorsum  foot/ on iv lasix/  suspect  from iv fluids  and  from   large dose of steroids   muscle  bx,  today/  follwo path   restart  eliquis   tomorrow   evening.   per surg  nunu palomino /  defer steroid  to rheum       rad< from: TTE W or WO Ultrasound Enhancing Agent (01.29.24 @ 18:15) >  ONCLUSIONS:   1. Left ventricular cavity is small. Left ventricular wall thickness is normal. Left ventricular systolic function is normal with an ejection fraction of 47 % by Ulloa's method of disks.   2. Unable to evaluate wall motion due to poor image quality. There is possible apical hypokinesis.   3. There is severe (grade 3) left ventricular diastolic dysfunction, with elevated filling pressure.   4. Normal right ventricular cavity size and normalsystolic function.   5. There is mild tricuspid regurgitation. Estimated pulmonary artery systolic pressure is 23 mmHg.   6. Compared to the transthoracic echocardiogram performed on 11/26/2023, there have been no significant interval changes.  ____________________________________________________________________  Recommendations:  If clincially indicated, patient should return for repeat echocardiogram with contrast agent, for enhanced left ventricular opacification and improved delineation of the left ventricular endocardial borders. Would suggest a limited study with UEA/ contrast for better delineation of wall motion abnormalities.  ________________________________________________________________________________________  FINDINGS:  < end of copied text >         rad    -

## 2024-05-02 NOTE — PROGRESS NOTE ADULT - PROBLEM SELECTOR PLAN 1
Will increase Lantus to 34 u at bedtime.  Will increase Admelog to 15 u before each meal and continue Admelog correction scale coverage. Will continue monitoring FS and Follow up.  Insulins to be adjusted pending steroid taper  DC on home Semglee   Stop home Farxiga altogther and any SGLT2i due to recent BARRY/rhabdo  OP follow up

## 2024-05-02 NOTE — PROGRESS NOTE ADULT - ASSESSMENT
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 The sudden onset of cramping weakness, however, is unusual for an inflammatory myositis as it is usually a insidious/chronic process over weeks/months. Additionally, the rapid downtrend in CK is also unusual for inflammatory myositis, and suspect it may be more 2/2 fluids rather than the actual steroids that he has received so far. Will need additional testing to make a diagnosis.    Serology: QuantiFeron (indeterminant), negative hep B, IgG 505    Plan:  -s/p R thigh muscle biopsy 5/2/24 - will f/u results  -c/w solumedrol 20mg q12 to treat suspected inflammatory myositis    -f/u myomarker panel, HMGCoA reductase, hep C     #per inflammatory myositis malignancy screening guidelines: Pt is intermediate risk for malignancy (reports normal C-scope 2024)  -CT neck, chest, abdomen, pelvis WNL  -PSA WNL  -Elevated ESR/CRP which likely due to suspected inflammatory myositis  -f/u SPEP/UPEP/immunofixation    Discussed with Attending Dr. Nahun Mayr, DO  Rheumatology Fellow  Available on TEAMS    Reference: International Guideline for Idiopathic Inflammatory Myopathy-Associated Cancer Screening: an International Myositis Assessment and Clinical Studies Group (IMACS) initiative 70 yo M PMH HLD, CAD (s/p 10 stents, last 4/2024), Afib on Eliquis, HFrEF, CKD3 2/2 DM/HTN p/w LE pain/cramping found to have CK 28K     #significant proximal muscle weakness (thighs>shoulders) w/ CK 28K unresponsive to fluids (5-7L)  #low grade CK elevation 971 (1/29/24), 1318 (2/9/24) reported in the past  #on home lipitor 80mg  #MRI thighs showing nonspecific myositis involving b/l hip girdle/thighs    working diagnosis: Constellation of symptoms and findings above 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       Plan:  --c/w solumedrol 20mg q12 to treat suspected inflammatory myositis    { reduced dose given HF and recent stent placement}  No signs of major organ involvement at this time (no respiratory involvement or dysphagia)   --continue with hydration as tolerated  LE edema being managed with Lasix by card/renal   -s/p R thigh muscle biopsy 5/2/24 - will f/u results  -f/u myomarker panel, HMGCoA reductase      #per inflammatory myositis malignancy screening guidelines: Pt is intermediate risk for malignancy (reports normal C-scope 2024)  -CT neck, chest, abdomen, pelvis WNL  -PSA WNL    #Elevated ESR/CRP   likely due to suspected inflammatory myositis  SPEP/UPEP/immunofixation WNL     # QTB indeterminant  likely anergic   CT chest unrevealing  (Hep B, Hep C negative)      Discussed with Attending Dr. Nahun Mary, DO  Rheumatology Fellow  Available on TEAMS    Reference: International Guideline for Idiopathic Inflammatory Myopathy-Associated Cancer Screening: an International Myositis Assessment and Clinical Studies Group (IMACS) initiative

## 2024-05-03 LAB
ADD ON TEST-SPECIMEN IN LAB: SIGNIFICANT CHANGE UP
ANION GAP SERPL CALC-SCNC: 11 MMOL/L — SIGNIFICANT CHANGE UP (ref 5–17)
ANION GAP SERPL CALC-SCNC: 11 MMOL/L — SIGNIFICANT CHANGE UP (ref 5–17)
BUN SERPL-MCNC: 64 MG/DL — HIGH (ref 7–23)
BUN SERPL-MCNC: 65 MG/DL — HIGH (ref 7–23)
CALCIUM SERPL-MCNC: 8.5 MG/DL — SIGNIFICANT CHANGE UP (ref 8.4–10.5)
CALCIUM SERPL-MCNC: 9.1 MG/DL — SIGNIFICANT CHANGE UP (ref 8.4–10.5)
CHLORIDE SERPL-SCNC: 100 MMOL/L — SIGNIFICANT CHANGE UP (ref 96–108)
CHLORIDE SERPL-SCNC: 98 MMOL/L — SIGNIFICANT CHANGE UP (ref 96–108)
CK SERPL-CCNC: 5990 U/L — HIGH (ref 30–200)
CO2 SERPL-SCNC: 25 MMOL/L — SIGNIFICANT CHANGE UP (ref 22–31)
CO2 SERPL-SCNC: 26 MMOL/L — SIGNIFICANT CHANGE UP (ref 22–31)
CREAT SERPL-MCNC: 1.59 MG/DL — HIGH (ref 0.5–1.3)
CREAT SERPL-MCNC: 1.61 MG/DL — HIGH (ref 0.5–1.3)
CULTURE RESULTS: SIGNIFICANT CHANGE UP
CULTURE RESULTS: SIGNIFICANT CHANGE UP
EGFR: 47 ML/MIN/1.73M2 — LOW
EGFR: 47 ML/MIN/1.73M2 — LOW
GLUCOSE BLDC GLUCOMTR-MCNC: 107 MG/DL — HIGH (ref 70–99)
GLUCOSE BLDC GLUCOMTR-MCNC: 196 MG/DL — HIGH (ref 70–99)
GLUCOSE BLDC GLUCOMTR-MCNC: 198 MG/DL — HIGH (ref 70–99)
GLUCOSE BLDC GLUCOMTR-MCNC: 230 MG/DL — HIGH (ref 70–99)
GLUCOSE SERPL-MCNC: 143 MG/DL — HIGH (ref 70–99)
GLUCOSE SERPL-MCNC: 252 MG/DL — HIGH (ref 70–99)
HCT VFR BLD CALC: 33 % — LOW (ref 39–50)
HCV AB S/CO SERPL IA: 0.06 S/CO — SIGNIFICANT CHANGE UP (ref 0–0.99)
HCV AB SERPL-IMP: SIGNIFICANT CHANGE UP
HGB BLD-MCNC: 10.8 G/DL — LOW (ref 13–17)
MAGNESIUM SERPL-MCNC: 2.1 MG/DL — SIGNIFICANT CHANGE UP (ref 1.6–2.6)
MCHC RBC-ENTMCNC: 28.6 PG — SIGNIFICANT CHANGE UP (ref 27–34)
MCHC RBC-ENTMCNC: 32.7 GM/DL — SIGNIFICANT CHANGE UP (ref 32–36)
MCV RBC AUTO: 87.3 FL — SIGNIFICANT CHANGE UP (ref 80–100)
NRBC # BLD: 0 /100 WBCS — SIGNIFICANT CHANGE UP (ref 0–0)
PHOSPHATE SERPL-MCNC: 3 MG/DL — SIGNIFICANT CHANGE UP (ref 2.5–4.5)
PLATELET # BLD AUTO: 479 K/UL — HIGH (ref 150–400)
POTASSIUM SERPL-MCNC: 4.8 MMOL/L — SIGNIFICANT CHANGE UP (ref 3.5–5.3)
POTASSIUM SERPL-MCNC: 4.8 MMOL/L — SIGNIFICANT CHANGE UP (ref 3.5–5.3)
POTASSIUM SERPL-SCNC: 4.8 MMOL/L — SIGNIFICANT CHANGE UP (ref 3.5–5.3)
POTASSIUM SERPL-SCNC: 4.8 MMOL/L — SIGNIFICANT CHANGE UP (ref 3.5–5.3)
RBC # BLD: 3.78 M/UL — LOW (ref 4.2–5.8)
RBC # FLD: 16.5 % — HIGH (ref 10.3–14.5)
SODIUM SERPL-SCNC: 135 MMOL/L — SIGNIFICANT CHANGE UP (ref 135–145)
SODIUM SERPL-SCNC: 136 MMOL/L — SIGNIFICANT CHANGE UP (ref 135–145)
SPECIMEN SOURCE: SIGNIFICANT CHANGE UP
SPECIMEN SOURCE: SIGNIFICANT CHANGE UP
WBC # BLD: 28.27 K/UL — HIGH (ref 3.8–10.5)
WBC # FLD AUTO: 28.27 K/UL — HIGH (ref 3.8–10.5)

## 2024-05-03 PROCEDURE — 93010 ELECTROCARDIOGRAM REPORT: CPT

## 2024-05-03 PROCEDURE — 99231 SBSQ HOSP IP/OBS SF/LOW 25: CPT

## 2024-05-03 RX ORDER — APIXABAN 2.5 MG/1
2.5 TABLET, FILM COATED ORAL EVERY 12 HOURS
Refills: 0 | Status: DISCONTINUED | OUTPATIENT
Start: 2024-05-03 | End: 2024-05-14

## 2024-05-03 RX ADMIN — Medication 1: at 16:55

## 2024-05-03 RX ADMIN — Medication 20 MILLIGRAM(S): at 17:02

## 2024-05-03 RX ADMIN — Medication 650 MILLIGRAM(S): at 17:02

## 2024-05-03 RX ADMIN — APIXABAN 2.5 MILLIGRAM(S): 2.5 TABLET, FILM COATED ORAL at 22:36

## 2024-05-03 RX ADMIN — Medication 15 UNIT(S): at 16:56

## 2024-05-03 RX ADMIN — Medication 2: at 08:38

## 2024-05-03 RX ADMIN — Medication 15 UNIT(S): at 12:49

## 2024-05-03 RX ADMIN — Medication 650 MILLIGRAM(S): at 07:20

## 2024-05-03 RX ADMIN — Medication 20 MILLIGRAM(S): at 05:58

## 2024-05-03 RX ADMIN — CHLORHEXIDINE GLUCONATE 1 APPLICATION(S): 213 SOLUTION TOPICAL at 12:08

## 2024-05-03 RX ADMIN — Medication 25 MILLIGRAM(S): at 05:59

## 2024-05-03 RX ADMIN — ISOSORBIDE DINITRATE 30 MILLIGRAM(S): 5 TABLET ORAL at 06:00

## 2024-05-03 RX ADMIN — Medication 1 APPLICATION(S): at 12:09

## 2024-05-03 RX ADMIN — Medication 650 MILLIGRAM(S): at 05:58

## 2024-05-03 RX ADMIN — INSULIN GLARGINE 34 UNIT(S): 100 INJECTION, SOLUTION SUBCUTANEOUS at 21:10

## 2024-05-03 RX ADMIN — ISOSORBIDE DINITRATE 30 MILLIGRAM(S): 5 TABLET ORAL at 16:57

## 2024-05-03 RX ADMIN — Medication 15 UNIT(S): at 08:39

## 2024-05-03 RX ADMIN — Medication 1: at 12:49

## 2024-05-03 RX ADMIN — Medication 40 MILLIGRAM(S): at 05:59

## 2024-05-03 RX ADMIN — CLOPIDOGREL BISULFATE 75 MILLIGRAM(S): 75 TABLET, FILM COATED ORAL at 12:08

## 2024-05-03 RX ADMIN — Medication 650 MILLIGRAM(S): at 18:00

## 2024-05-03 RX ADMIN — ISOSORBIDE DINITRATE 30 MILLIGRAM(S): 5 TABLET ORAL at 12:08

## 2024-05-03 RX ADMIN — Medication 25 MILLIGRAM(S): at 17:03

## 2024-05-03 NOTE — PROGRESS NOTE ADULT - PROBLEM SELECTOR PLAN 1
Will continue current insulin regimen for now. Will continue monitoring  blood sugars, will Follow up.  Patient counseled for compliance with consistent low carb diet.  .   Insulins to be adjusted pending steroid taper  DC on home Semglee   Stop home Farxiga altogther and any SGLT2i due to recent BARRY/rhabdo  OP follow up

## 2024-05-03 NOTE — PROGRESS NOTE ADULT - SUBJECTIVE AND OBJECTIVE BOX
Date of service: 24 @ 14:20      Patient is a 69y old  Male who presents with a chief complaint of Rhabdo (03 May 2024 12:14)                                                               INTERVAL HPI/OVERNIGHT EVENTS:    REVIEW OF SYSTEMS:     CONSTITUTIONAL: No weakness, fevers or chills  RESPIRATORY: No cough, wheezing,  No shortness of breath  CARDIOVASCULAR: No chest pain or palpitations  GASTROINTESTINAL: No abdominal pain  . No nausea, vomiting, or hematemesis; No diarrhea or constipation. No melena or hematochezia.  GENITOURINARY: No dysuria, frequency or hematuria  NEUROLOGICAL: No numbness or weakness  s                                                                                                                                                                                                                                                                                 Medications:  MEDICATIONS  (STANDING):  chlorhexidine 2% Cloths 1 Application(s) Topical daily  clopidogrel Tablet 75 milliGRAM(s) Oral daily  dextrose 10% Bolus 125 milliLiter(s) IV Bolus once  dextrose 5%. 1000 milliLiter(s) (50 mL/Hr) IV Continuous <Continuous>  dextrose 5%. 1000 milliLiter(s) (100 mL/Hr) IV Continuous <Continuous>  dextrose 50% Injectable 12.5 Gram(s) IV Push once  dextrose 50% Injectable 25 Gram(s) IV Push once  furosemide   Injectable 40 milliGRAM(s) IV Push daily  glucagon  Injectable 1 milliGRAM(s) IntraMuscular once  influenza  Vaccine (HIGH DOSE) 0.7 milliLiter(s) IntraMuscular once  insulin glargine Injectable (LANTUS) 34 Unit(s) SubCutaneous at bedtime  insulin lispro (ADMELOG) corrective regimen sliding scale   SubCutaneous at bedtime  insulin lispro (ADMELOG) corrective regimen sliding scale   SubCutaneous three times a day before meals  insulin lispro Injectable (ADMELOG) 15 Unit(s) SubCutaneous three times a day before meals  isosorbide   dinitrate Tablet (ISORDIL) 30 milliGRAM(s) Oral three times a day  methylPREDNISolone sodium succinate Injectable 20 milliGRAM(s) IV Push two times a day  metoprolol tartrate 25 milliGRAM(s) Oral two times a day  polyethylene glycol 3350 17 Gram(s) Oral daily  senna 2 Tablet(s) Oral at bedtime  silver sulfADIAZINE 1% Cream 1 Application(s) Topical daily  sodium chloride 0.9%. 1000 milliLiter(s) (75 mL/Hr) IV Continuous <Continuous>    MEDICATIONS  (PRN):  acetaminophen     Tablet .. 650 milliGRAM(s) Oral every 6 hours PRN Temp greater or equal to 38C (100.4F), Mild Pain (1 - 3)  dextrose Oral Gel 15 Gram(s) Oral once PRN Blood Glucose LESS THAN 70 milliGRAM(s)/deciliter       Allergies    atorvastatin (Muscle Pain (Severe))    Intolerances      Vital Signs Last 24 Hrs  T(C): 36.9 (03 May 2024 11:17), Max: 36.9 (03 May 2024 11:17)  T(F): 98.4 (03 May 2024 11:17), Max: 98.4 (03 May 2024 11:17)  HR: 80 (03 May 2024 11:17) (77 - 91)  BP: 126/69 (03 May 2024 11:17) (115/70 - 126/69)  BP(mean): --  RR: 18 (03 May 2024 11:17) (18 - 18)  SpO2: 98% (03 May 2024 11:17) (96% - 100%)    Parameters below as of 03 May 2024 11:17  Patient On (Oxygen Delivery Method): room air      CAPILLARY BLOOD GLUCOSE      POCT Blood Glucose.: 198 mg/dL (03 May 2024 12:13)  POCT Blood Glucose.: 230 mg/dL (03 May 2024 07:55)  POCT Blood Glucose.: 268 mg/dL (02 May 2024 21:59)  POCT Blood Glucose.: 327 mg/dL (02 May 2024 17:01)       @ 07:  -   @ 07:00  --------------------------------------------------------  IN: 1470 mL / OUT: 2450 mL / NET: -980 mL     @ 07:  -   @ 14:20  --------------------------------------------------------  IN: 600 mL / OUT: 850 mL / NET: -250 mL      Physical Exam:    Daily     Daily Weight in k.9 (03 May 2024 07:59)  General:  Well appearing, NAD, not cachetic  HEENT:  Nonicteric, PERRLA  CV:  RRR, S1S2   Lungs:  CTA B/L, no wheezes, rales, rhonchi  Abdomen:  Soft, non-tender, no distended, positive BS  Extremities: edema   Neuro:  AAOx3, non-focal, grossly intact                                                                                                                                                                                                                                                                                                LABS:                               10.8   28.27 )-----------( 479      ( 03 May 2024 05:57 )             33.0                      05-    135  |  98  |  65<H>  ----------------------------<  252<H>  4.8   |  26  |  1.59<H>    Ca    9.1      03 May 2024 05:57  Phos  3.9     05-  Mg     2.2     -                         RADIOLOGY & ADDITIONAL TESTS         I personally reviewed: [  ]EKG   [  ]CXR    [  ] CT      A/P:         Discussed with :     Derek consultants' Notes   Time spent :

## 2024-05-03 NOTE — PROGRESS NOTE ADULT - SUBJECTIVE AND OBJECTIVE BOX
Chief complaint    Patient is a 69y old  Male who presents with a chief complaint of Rhabdo (03 May 2024 14:20)   Review of systems  Patient appears comfortable.    Labs and Fingersticks  CAPILLARY BLOOD GLUCOSE      POCT Blood Glucose.: 198 mg/dL (03 May 2024 12:13)  POCT Blood Glucose.: 230 mg/dL (03 May 2024 07:55)  POCT Blood Glucose.: 268 mg/dL (02 May 2024 21:59)  POCT Blood Glucose.: 327 mg/dL (02 May 2024 17:01)      Anion Gap: 11 (05-03 @ 05:57)  Anion Gap: 12 (05-02 @ 04:36)      Calcium: 9.1 (05-03 @ 05:57)  Calcium: 9.0 (05-02 @ 04:36)          05-03    135  |  98  |  65<H>  ----------------------------<  252<H>  4.8   |  26  |  1.59<H>    Ca    9.1      03 May 2024 05:57  Phos  3.9     05-02  Mg     2.2     05-02                          10.8   28.27 )-----------( 479      ( 03 May 2024 05:57 )             33.0     Medications  MEDICATIONS  (STANDING):  chlorhexidine 2% Cloths 1 Application(s) Topical daily  clopidogrel Tablet 75 milliGRAM(s) Oral daily  dextrose 10% Bolus 125 milliLiter(s) IV Bolus once  dextrose 5%. 1000 milliLiter(s) (50 mL/Hr) IV Continuous <Continuous>  dextrose 5%. 1000 milliLiter(s) (100 mL/Hr) IV Continuous <Continuous>  dextrose 50% Injectable 12.5 Gram(s) IV Push once  dextrose 50% Injectable 25 Gram(s) IV Push once  furosemide   Injectable 40 milliGRAM(s) IV Push daily  glucagon  Injectable 1 milliGRAM(s) IntraMuscular once  influenza  Vaccine (HIGH DOSE) 0.7 milliLiter(s) IntraMuscular once  insulin glargine Injectable (LANTUS) 34 Unit(s) SubCutaneous at bedtime  insulin lispro (ADMELOG) corrective regimen sliding scale   SubCutaneous three times a day before meals  insulin lispro (ADMELOG) corrective regimen sliding scale   SubCutaneous at bedtime  insulin lispro Injectable (ADMELOG) 15 Unit(s) SubCutaneous three times a day before meals  isosorbide   dinitrate Tablet (ISORDIL) 30 milliGRAM(s) Oral three times a day  methylPREDNISolone sodium succinate Injectable 20 milliGRAM(s) IV Push two times a day  metoprolol tartrate 25 milliGRAM(s) Oral two times a day  polyethylene glycol 3350 17 Gram(s) Oral daily  senna 2 Tablet(s) Oral at bedtime  silver sulfADIAZINE 1% Cream 1 Application(s) Topical daily  sodium chloride 0.9%. 1000 milliLiter(s) (75 mL/Hr) IV Continuous <Continuous>      Physical Exam  General: Patient appears comfortable.  Vital Signs Last 12 Hrs  T(F): 98.4 (05-03-24 @ 11:17), Max: 98.4 (05-03-24 @ 11:17)  HR: 80 (05-03-24 @ 11:17) (78 - 80)  BP: 126/69 (05-03-24 @ 11:17) (115/70 - 126/69)  BP(mean): --  RR: 18 (05-03-24 @ 11:17) (18 - 18)  SpO2: 98% (05-03-24 @ 11:17) (98% - 98%)  Neck: No palpable thyroid nodules.  CVS: S1S2, No murmurs  Respiratory: No wheezing, no crepitations  GI: Abdomen soft, non tender.    Diagnostics        Radiology:

## 2024-05-03 NOTE — PROGRESS NOTE ADULT - ASSESSMENT
70 y/o male with hx of CAD s/p multiple  stents (most recent to pLM (70%), pLAD, mLAD on 1/8/24 with Dr. Dominguez), HFrEF , A-fib, HTN, HLD, T2DM,  CKD3, former smoker  Assessment  DMT2: 69y Male with DM T2 with hyperglycemia, A1C 7.6% , was on oral meds and insulin at home  (Farxiga and Semglee basal insulin), now on basal bolus insulins, sugars  trending down.  was started on steroids, having steroid induced hyperglycemias  CAD: on medications, stable, monitored.   HTN: on antihypertensive medications, monitored, asymptomatic.  CKD: Monitor labs/BMP, renal follow up. rhabdo, muscle bx jane Rossi MD  Cell: [phone]  Office: [phone]

## 2024-05-03 NOTE — PROGRESS NOTE ADULT - SUBJECTIVE AND OBJECTIVE BOX
Overnight events noted      VITAL:  T(C): , Max: 36.9 (05-03-24 @ 11:17)  T(F): , Max: 98.4 (05-03-24 @ 11:17)  HR: 80 (05-03-24 @ 11:17)  BP: 126/69 (05-03-24 @ 11:17)  BP(mean): --  RR: 18 (05-03-24 @ 11:17)  SpO2: 98% (05-03-24 @ 11:17)  Wt(kg): --      PHYSICAL EXAM:  Constitutional: alert, NAD  HEENT: NCAT, MMM  Neck: Supple, No JVD  Respiratory: CTA-b/l  Cardiovascular: RRR s1s2, no m/r/g  Gastrointestinal: BS+, soft, NT/ND  Extremities: (+)b/l LE edema/mild tenderness  Neurological: no focal deficits; strength grossly intact  Back: no CVAT b/l  Skin: No rashes, no nevi    LABS:                        10.8   28.27 )-----------( 479      ( 03 May 2024 05:57 )             33.0     Na(135)/K(4.8)/Cl(98)/HCO3(26)/BUN(65)/Cr(1.59)Glu(252)/Ca(9.1)/Mg(--)/PO4(--)    05-03 @ 05:57  Na(135)/K(4.5)/Cl(99)/HCO3(24)/BUN(72)/Cr(1.52)Glu(288)/Ca(9.0)/Mg(2.2)/PO4(3.9)    05-02 @ 04:36  Na(137)/K(4.5)/Cl(103)/HCO3(22)/BUN(70)/Cr(1.38)Glu(178)/Ca(9.2)/Mg(--)/PO4(--)    05-01 @ 08:25    5990<==7646    IMPRESSION: 69M w/ HTN, DM2, CAD, AFib, HFrEF, and CKD, s/p recent PCI, 4/25/24 returned with acute rhabdomyolysis    (1)Renal - CKD3b - due to diabetes/hypertension - at/near baseline  (2)Rhabdo - starting to improve again, back on IVF  (3)Metabolic acidosis - resolved  (4)Rheum - 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 - Surgery and Rheum on board- s/p muscle biopsy 5/2    RECOMMEND:  (1)NS 75cc/h   (2)Lasix 40mg iv daily as ordered  (3)F/U BMP+Mg+CPK daily; can try reducing NS to 40cc/h once CPK trends below 5000        Markell Walton MD  [facility]  Office/on call physician: [phone]  Cell (7a-7p): [phone]       (+)LE pain/weakness; slowly improving      VITAL:  T(C): , Max: 36.9 (05-03-24 @ 11:17)  T(F): , Max: 98.4 (05-03-24 @ 11:17)  HR: 80 (05-03-24 @ 11:17)  BP: 126/69 (05-03-24 @ 11:17)  BP(mean): --  RR: 18 (05-03-24 @ 11:17)  SpO2: 98% (05-03-24 @ 11:17)  Wt(kg): --      PHYSICAL EXAM:  Constitutional: alert, NAD  HEENT: NCAT, MMM  Neck: Supple, No JVD  Respiratory: CTA-b/l  Cardiovascular: RRR s1s2, no m/r/g  Gastrointestinal: BS+, soft, NT/ND  Extremities: (+)b/l LE edema/mild tenderness  Neurological: no focal deficits; strength grossly intact  Back: no CVAT b/l  Skin: No rashes, no nevi    LABS:                        10.8   28.27 )-----------( 479      ( 03 May 2024 05:57 )             33.0     Na(135)/K(4.8)/Cl(98)/HCO3(26)/BUN(65)/Cr(1.59)Glu(252)/Ca(9.1)/Mg(--)/PO4(--)    05-03 @ 05:57  Na(135)/K(4.5)/Cl(99)/HCO3(24)/BUN(72)/Cr(1.52)Glu(288)/Ca(9.0)/Mg(2.2)/PO4(3.9)    05-02 @ 04:36  Na(137)/K(4.5)/Cl(103)/HCO3(22)/BUN(70)/Cr(1.38)Glu(178)/Ca(9.2)/Mg(--)/PO4(--)    05-01 @ 08:25    5990<==7646    IMPRESSION: 69M w/ HTN, DM2, CAD, AFib, HFrEF, and CKD, s/p recent PCI, 4/25/24 returned with acute rhabdomyolysis    (1)Renal - CKD3b - due to diabetes/hypertension - at/near baseline  (2)Rhabdo - starting to improve again, back on IVF  (3)Metabolic acidosis - resolved  (4)Rheum - 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 - Surgery and Rheum on board- s/p muscle biopsy 5/2    RECOMMEND:  (1)NS 75cc/h   (2)Lasix 40mg iv daily as ordered  (3)F/U BMP+Mg+CPK daily; can try reducing NS to 40cc/h once CPK trends below 5000        Markell Walton MD  [facility]  Office/on call physician: [phone]  Cell (7a-7p): [phone]

## 2024-05-03 NOTE — PROGRESS NOTE ADULT - ASSESSMENT
69y Male patient S/P right posterior thigh/biceps femoris muscle biopsy for rhabdomyolysis work up. Patient tolerated procedure and is doing well.     Plan:  - Restart eliquis  (5/4/24)  - Pain control PRN  - Diet: Regular  - Activity: OOBAT  - DVT ppx: SCD's  - Excellent care per primary      Trauma/ACS  [id number].

## 2024-05-03 NOTE — PROGRESS NOTE ADULT - ASSESSMENT
68 y/o man      h/o severe CAD S/P multiple PCI's including multiple LM and LAD stents.       +A-fib, HTN, HLD, T2DM, CKD3, former smoker     presents with  weaknss/   leg  pains.  no leg  weakness      + troponin /  elevated  cpk      CAD,  has   10  stents/  HTN/  HLD        cath,   4/18,  2.0 mm caliber TITO to distal LAD,Dr ILDA Bernal        on  dapt/  ef   47     troponin,  likely   demand  ischemia.  not  mi  per  dr arellano,,  not a  candidate   for  further intervention,   small  caliber  vessels   c/c  Afib,  on eliquis           rhabdomyolysis,/  elevated  CPK,    suspect  statin induced.  rhabdomyolysis/  form drug vs  polymyositis,    need  to  r/o  polymyositis, vs  drug induced   myositis,    mri  pelvis,  myositis/  fx  femoral condyle.  ortho  eval  noted,  rec immobilizer  pt refusing    ct a/p, neck, no  malignancy   await   bx   by   surg dr adam palomino/  on iv  steroid  per   rheum   cont off statin   fu BX   monitor  MACARENA   cont high steroids   fu with rheum   fu bx       afib restart eliquis     renal failure : monitor       elevated lfts,   abd  u/s,  normal liver       PAD/  will  f/p  a s an out pt with vascular. when stable   on asa/  eliquis, plavix, / lasix, imdur.  valsartan.  toprol      hold   Farxiga   and  valsartan for  now,  iv fluids,  per  renal   c/c  anemia            CAD : stent/on  4/17/24.   only   option , to   temporarily hold  eliquis  and   continue   plavix,   and   card  dr bravo and  dr brijesh bernal in  agreement    wbc  noted, from steroid,  cpk/  lfts decreasing     pedal  edema   blister,  dorsum  foot/ on iv lasix/  suspect  from iv fluids  and  from   large dose of steroids   muscle  bx,  today/  follwo path        -

## 2024-05-03 NOTE — CHART NOTE - NSCHARTNOTEFT_GEN_A_CORE
Medicine PA note    Notified by RN pt w/ a first tele event of 10 beats WCT for 3.6 secs, , pt is asx, VSS, and was going to use the toilet when event happened. Pt denies HA, dizziness, visual changes, SOB, CP and difficulty breathing.     -EKG stat ordered  -BMP  -Mag and Phos   -pt has no further concerns at this time  -will f/u on labs ordered above  -will endorse to primary in AM    Gudelia Clifford PA-C  Medicine

## 2024-05-03 NOTE — PROGRESS NOTE ADULT - SUBJECTIVE AND OBJECTIVE BOX
SURGERY DAILY PROGRESS NOTE    STATUS POST:  Biopsy, superficial muscle, lower extremity        SUBJECTIVE: Pt seen and examined at bedside. Admits to some pain of leg but otherwise feeling well. Denies chest pain, SOB, palpitations, HA, fever, chills, N/V.      OBJECTIVE:  Vital Signs Last 24 Hrs  T(C): 36.4 (03 May 2024 04:17), Max: 36.8 (02 May 2024 21:03)  T(F): 97.6 (03 May 2024 04:17), Max: 98.3 (02 May 2024 21:03)  HR: 78 (03 May 2024 04:17) (75 - 91)  BP: 115/70 (03 May 2024 04:17) (115/70 - 139/64)  BP(mean): 89 (02 May 2024 10:15) (87 - 92)  RR: 18 (03 May 2024 04:17) (16 - 18)  SpO2: 98% (03 May 2024 04:17) (96% - 100%)    Parameters below as of 03 May 2024 04:17  Patient On (Oxygen Delivery Method): room air        I&O's Summary    02 May 2024 07:01  -  03 May 2024 07:00  --------------------------------------------------------  IN: 1470 mL / OUT: 2450 mL / NET: -980 mL    03 May 2024 07:01  -  03 May 2024 09:39  --------------------------------------------------------  IN: 0 mL / OUT: 550 mL / NET: -550 mL        Physical Exam:  General Appearance: Appears well, NAD, A& O x 3  Neck: Trachea midline   Chest: Equal expansion bilaterally  CV: Pulse regular presently  Abdomen: Soft, nontense  Extremities: right posterior thigh incision soft w/o hematoma, dressing with minimal serosang strikethrough  Grossly symmetric, SCD's in place     LABS:                        10.8   28.27 )-----------( 479      ( 03 May 2024 05:57 )             33.0     05-03    135  |  98  |  65<H>  ----------------------------<  252<H>  4.8   |  26  |  1.59<H>    Ca    9.1      03 May 2024 05:57  Phos  3.9     05-02  Mg     2.2     05-02      PT/INR - ( 02 May 2024 04:36 )   PT: 15.6 sec;   INR: 1.43 ratio         PTT - ( 02 May 2024 04:36 )  PTT:31.1 sec  Urinalysis Basic - ( 03 May 2024 05:57 )    Color: x / Appearance: x / SG: x / pH: x  Gluc: 252 mg/dL / Ketone: x  / Bili: x / Urobili: x   Blood: x / Protein: x / Nitrite: x   Leuk Esterase: x / RBC: x / WBC x   Sq Epi: x / Non Sq Epi: x / Bacteria: x        RADIOLOGY & ADDITIONAL STUDIES:

## 2024-05-04 LAB
ADD ON TEST-SPECIMEN IN LAB: SIGNIFICANT CHANGE UP
ALBUMIN SERPL ELPH-MCNC: 2.6 G/DL — LOW (ref 3.3–5)
ALP SERPL-CCNC: 96 U/L — SIGNIFICANT CHANGE UP (ref 40–120)
ALT FLD-CCNC: 553 U/L — HIGH (ref 10–45)
ANION GAP SERPL CALC-SCNC: 11 MMOL/L — SIGNIFICANT CHANGE UP (ref 5–17)
AST SERPL-CCNC: 313 U/L — HIGH (ref 10–40)
BASOPHILS # BLD AUTO: 0 K/UL — SIGNIFICANT CHANGE UP (ref 0–0.2)
BASOPHILS NFR BLD AUTO: 0 % — SIGNIFICANT CHANGE UP (ref 0–2)
BILIRUB SERPL-MCNC: 0.5 MG/DL — SIGNIFICANT CHANGE UP (ref 0.2–1.2)
BUN SERPL-MCNC: 53 MG/DL — HIGH (ref 7–23)
CALCIUM SERPL-MCNC: 7.4 MG/DL — LOW (ref 8.4–10.5)
CHLORIDE SERPL-SCNC: 104 MMOL/L — SIGNIFICANT CHANGE UP (ref 96–108)
CK SERPL-CCNC: 3465 U/L — HIGH (ref 30–200)
CO2 SERPL-SCNC: 23 MMOL/L — SIGNIFICANT CHANGE UP (ref 22–31)
CREAT SERPL-MCNC: 1.4 MG/DL — HIGH (ref 0.5–1.3)
EGFR: 55 ML/MIN/1.73M2 — LOW
EOSINOPHIL # BLD AUTO: 0 K/UL — SIGNIFICANT CHANGE UP (ref 0–0.5)
EOSINOPHIL NFR BLD AUTO: 0 % — SIGNIFICANT CHANGE UP (ref 0–6)
GLUCOSE BLDC GLUCOMTR-MCNC: 129 MG/DL — HIGH (ref 70–99)
GLUCOSE BLDC GLUCOMTR-MCNC: 147 MG/DL — HIGH (ref 70–99)
GLUCOSE BLDC GLUCOMTR-MCNC: 219 MG/DL — HIGH (ref 70–99)
GLUCOSE BLDC GLUCOMTR-MCNC: 94 MG/DL — SIGNIFICANT CHANGE UP (ref 70–99)
GLUCOSE SERPL-MCNC: 135 MG/DL — HIGH (ref 70–99)
HCT VFR BLD CALC: 28.7 % — LOW (ref 39–50)
HGB BLD-MCNC: 9.1 G/DL — LOW (ref 13–17)
HMGCR ANTIBODY, IGG RESULT: <3 UNITS — SIGNIFICANT CHANGE UP (ref 0–19)
LYMPHOCYTES # BLD AUTO: 1.22 K/UL — SIGNIFICANT CHANGE UP (ref 1–3.3)
LYMPHOCYTES # BLD AUTO: 5.2 % — LOW (ref 13–44)
MAGNESIUM SERPL-MCNC: 1.8 MG/DL — SIGNIFICANT CHANGE UP (ref 1.6–2.6)
MCHC RBC-ENTMCNC: 28.5 PG — SIGNIFICANT CHANGE UP (ref 27–34)
MCHC RBC-ENTMCNC: 31.7 GM/DL — LOW (ref 32–36)
MCV RBC AUTO: 90 FL — SIGNIFICANT CHANGE UP (ref 80–100)
MONOCYTES # BLD AUTO: 0.82 K/UL — SIGNIFICANT CHANGE UP (ref 0–0.9)
MONOCYTES NFR BLD AUTO: 3.5 % — SIGNIFICANT CHANGE UP (ref 2–14)
NEUTROPHILS # BLD AUTO: 21.23 K/UL — HIGH (ref 1.8–7.4)
NEUTROPHILS NFR BLD AUTO: 89.5 % — HIGH (ref 43–77)
PLATELET # BLD AUTO: 397 K/UL — SIGNIFICANT CHANGE UP (ref 150–400)
POTASSIUM SERPL-MCNC: 4 MMOL/L — SIGNIFICANT CHANGE UP (ref 3.5–5.3)
POTASSIUM SERPL-SCNC: 4 MMOL/L — SIGNIFICANT CHANGE UP (ref 3.5–5.3)
PROT SERPL-MCNC: 4.1 G/DL — LOW (ref 6–8.3)
RBC # BLD: 3.19 M/UL — LOW (ref 4.2–5.8)
RBC # FLD: 16.3 % — HIGH (ref 10.3–14.5)
SODIUM SERPL-SCNC: 138 MMOL/L — SIGNIFICANT CHANGE UP (ref 135–145)
WBC # BLD: 23.49 K/UL — HIGH (ref 3.8–10.5)
WBC # FLD AUTO: 23.49 K/UL — HIGH (ref 3.8–10.5)

## 2024-05-04 RX ORDER — INSULIN LISPRO 100/ML
10 VIAL (ML) SUBCUTANEOUS
Refills: 0 | Status: DISCONTINUED | OUTPATIENT
Start: 2024-05-04 | End: 2024-05-05

## 2024-05-04 RX ORDER — ACETAMINOPHEN 500 MG
650 TABLET ORAL ONCE
Refills: 0 | Status: COMPLETED | OUTPATIENT
Start: 2024-05-04 | End: 2024-05-04

## 2024-05-04 RX ORDER — INSULIN GLARGINE 100 [IU]/ML
26 INJECTION, SOLUTION SUBCUTANEOUS AT BEDTIME
Refills: 0 | Status: DISCONTINUED | OUTPATIENT
Start: 2024-05-04 | End: 2024-05-05

## 2024-05-04 RX ORDER — METOPROLOL TARTRATE 50 MG
50 TABLET ORAL
Refills: 0 | Status: DISCONTINUED | OUTPATIENT
Start: 2024-05-04 | End: 2024-05-14

## 2024-05-04 RX ADMIN — Medication 20 MILLIGRAM(S): at 17:04

## 2024-05-04 RX ADMIN — Medication 650 MILLIGRAM(S): at 01:30

## 2024-05-04 RX ADMIN — SODIUM CHLORIDE 75 MILLILITER(S): 9 INJECTION INTRAMUSCULAR; INTRAVENOUS; SUBCUTANEOUS at 05:20

## 2024-05-04 RX ADMIN — CHLORHEXIDINE GLUCONATE 1 APPLICATION(S): 213 SOLUTION TOPICAL at 11:48

## 2024-05-04 RX ADMIN — ISOSORBIDE DINITRATE 30 MILLIGRAM(S): 5 TABLET ORAL at 17:07

## 2024-05-04 RX ADMIN — CLOPIDOGREL BISULFATE 75 MILLIGRAM(S): 75 TABLET, FILM COATED ORAL at 11:46

## 2024-05-04 RX ADMIN — Medication 25 MILLIGRAM(S): at 05:08

## 2024-05-04 RX ADMIN — APIXABAN 2.5 MILLIGRAM(S): 2.5 TABLET, FILM COATED ORAL at 17:04

## 2024-05-04 RX ADMIN — Medication 25 MILLIGRAM(S): at 17:04

## 2024-05-04 RX ADMIN — ISOSORBIDE DINITRATE 30 MILLIGRAM(S): 5 TABLET ORAL at 05:08

## 2024-05-04 RX ADMIN — Medication 20 MILLIGRAM(S): at 05:08

## 2024-05-04 RX ADMIN — Medication 650 MILLIGRAM(S): at 05:34

## 2024-05-04 RX ADMIN — Medication 10 UNIT(S): at 12:15

## 2024-05-04 RX ADMIN — Medication 650 MILLIGRAM(S): at 00:34

## 2024-05-04 RX ADMIN — APIXABAN 2.5 MILLIGRAM(S): 2.5 TABLET, FILM COATED ORAL at 05:08

## 2024-05-04 RX ADMIN — Medication 15 UNIT(S): at 08:32

## 2024-05-04 RX ADMIN — ISOSORBIDE DINITRATE 30 MILLIGRAM(S): 5 TABLET ORAL at 11:46

## 2024-05-04 RX ADMIN — Medication 40 MILLIGRAM(S): at 05:11

## 2024-05-04 RX ADMIN — Medication 50 MILLIGRAM(S): at 18:14

## 2024-05-04 RX ADMIN — INSULIN GLARGINE 26 UNIT(S): 100 INJECTION, SOLUTION SUBCUTANEOUS at 21:28

## 2024-05-04 RX ADMIN — Medication 10 UNIT(S): at 17:03

## 2024-05-04 NOTE — PHYSICAL THERAPY INITIAL EVALUATION ADULT - NS ASR WT BEARING DETAIL RLE
preferred in knee immobilizer but okay per ortho without as patient defers/weight-bearing as tolerated

## 2024-05-04 NOTE — PHYSICAL THERAPY INITIAL EVALUATION ADULT - NSPTDISCHREC_GEN_A_CORE
if home at this time, pt would require significant assist for stairs, home PT, RW and SC./Sub-acute Rehab

## 2024-05-04 NOTE — PHYSICAL THERAPY INITIAL EVALUATION ADULT - BALANCE DISTURBANCE, IDENTIFIED IMPAIRMENT CONTRIBUTE, REHAB EVAL
Conjuntivae and eyelids appear normal , Sclerae : White without injection
impaired motor control/decreased strength

## 2024-05-04 NOTE — PROGRESS NOTE ADULT - ASSESSMENT
70 y/o male with hx of CAD s/p multiple  stents (most recent to pLM (70%), pLAD, mLAD on 1/8/24 with Dr. Dominguez), HFrEF , A-fib, HTN, HLD, T2DM,  CKD3, former smoker  Assessment  DMT2: 69y Male with DM T2 with hyperglycemia, A1C 7.6% , was on oral meds and insulin at home  (Farxiga and Semglee basal insulin), now on basal bolus insulins, sugars are within acceptable range.  was started on steroids, having steroid induced hyperglycemias  CAD: on medications, stable, monitored.   HTN: on antihypertensive medications, monitored, asymptomatic.  CKD: Monitor labs/BMP, renal follow up. rhabdo, muscle bx jane Rossi MD  Cell: [phone]  Office: [phone]

## 2024-05-04 NOTE — PHYSICAL THERAPY INITIAL EVALUATION ADULT - PERTINENT HX OF CURRENT PROBLEM, REHAB EVAL
Pt is a 70yo M w/ PMH of HTN, HLD, DM2, CAD s/p 10 stents (last 1 week ago), Afib on Eliquis, HFrEF, CKD III present to [facility] ED w LE pain/cramping. Of note, Pt recently admitted 04/16-04/19 for NSTEMI s/p C w/ stent placement. He now reports b/l LE pain, stiffness and cramping w/ difficulty ambulating or even crossing legs for the past 7-8 days since discharge from the hospital. He also now reports dark urine. He also reports some mild pressure like CP since discharge for which he followed up w/ his cardiologist outpt and noted to be likely i/s/o recent cath and to c/w Isosorbide. He went to PMD and i/s/o weakness in LE and tachycardia, he was advised to present to the ED. Hospital course:  In the ED VSS w/ labs notable for leukocytosis to 15 and Cr 1.92 (~baseline) w/ elevated liver enzymes, trop 505 -> 506[place] [address]. He was administered 2L IVF.  Elevated liver enzymes likely i/s/o statin induced rhabdomyolysis and DILI.  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  Nonspecific, heterogeneous but grossly symmetric patchy myositis of bilateral hip girdle and thigh musculature. Consider muscle biopsy of left proximal anterior gluteus medius or ipsilateral distal tensor fascial lary. 2.  Small subacute nondisplaced subchondral insufficiency fracture of left lateral femoral condyle.  Ortho consult: given pt needs to mobilize, chronicity, and lack of L knee pain/TTP, OK to be WBAT LLE (ideally should be WBAT LLE in a knee immobilizer; however, he refused any wrapping or placement of the brace)  Pt now s/p muscle Biopsy x 3 to superficial muscle, lower extremity of Right posterior, biceps femoris 5/2/24.

## 2024-05-04 NOTE — PHYSICAL THERAPY INITIAL EVALUATION ADULT - GAIT DEVIATIONS NOTED, PT EVAL
decreased gio/increased time in double stance/decreased step length/decreased stride length/decreased weight-shifting ability

## 2024-05-04 NOTE — PHYSICAL THERAPY INITIAL EVALUATION ADULT - ADDITIONAL COMMENTS
Pt lives in a private house with his wife and mother in law.  He has 7 steps to enter with single handrail and 1 flights of stairs to upstairs bathroom with single handrail.  Pt was previously independent in all areas of mobility including stairs without any AD.  Pt has no other DME in the home.

## 2024-05-04 NOTE — PROGRESS NOTE ADULT - ASSESSMENT
70 y/o man      h/o severe CAD S/P multiple PCI's including multiple LM and LAD stents.       +A-fib, HTN, HLD, T2DM, CKD3, former smoker     presents with  weaknss/   leg  pains.  no leg  weakness      + troponin /  elevated  cpk      CAD,  has   10  stents/  HTN/  HLD        cath,   4/18,  2.0 mm caliber TITO to distal LAD,Dr ILDA Bernal        on  dapt/  ef   47     troponin,  likely   demand  ischemia.  not  mi  per  dr arellano,,  not a  candidate   for  further intervention,   small  caliber  vessels   c/c  Afib,  on eliquis           rhabdomyolysis,/  elevated  CPK,    suspect  statin induced.  rhabdomyolysis/  form drug vs  polymyositis,    need  to  r/o  polymyositis, vs  drug induced   myositis,    mri  pelvis,  myositis/  fx  femoral condyle.  ortho  eval  noted,  rec immobilizer  pt refusing    ct a/p, neck, no  malignancy   await   bx   by   surg DR. palomino/  on iv  steroid  per   rheum   cont off statin   fu BX   follow  path  monitor  MACARENA   cont high steroids   fu with rheum   fu bx       afib restart eliquis     renal failure : monitor       elevated lfts,   abd  u/s,  normal liver       PAD/  will  f/p  a s an out pt with vascular. when stable   on asa/  eliquis, plavix, / lasix, imdur.  valsartan.  toprol      hold   Farxiga   and  valsartan for  now,  iv fluids,  per  renal   c/c  anemia            CAD : stent/on  4/17/24.   only   option , to   temporarily hold  eliquis  and   continue   plavix,   and   card  dr bravo and  dr brijesh bernal in  agreement    wbc  noted, from steroid,  cpk/  lfts decreasing           -

## 2024-05-04 NOTE — PROGRESS NOTE ADULT - SUBJECTIVE AND OBJECTIVE BOX
SUBJECTIVE: Notes leg discomfort. In chair.   	  MEDICATIONS:  furosemide   Injectable 40 milliGRAM(s) IV Push daily  isosorbide   dinitrate Tablet (ISORDIL) 30 milliGRAM(s) Oral three times a day  metoprolol tartrate 25 milliGRAM(s) Oral two times a day  acetaminophen     Tablet .. 650 milliGRAM(s) Oral every 6 hours PRN  polyethylene glycol 3350 17 Gram(s) Oral daily  senna 2 Tablet(s) Oral at bedtime  dextrose 50% Injectable 25 Gram(s) IV Push once  dextrose 50% Injectable 12.5 Gram(s) IV Push once  dextrose Oral Gel 15 Gram(s) Oral once PRN  glucagon  Injectable 1 milliGRAM(s) IntraMuscular once  insulin glargine Injectable (LANTUS) 26 Unit(s) SubCutaneous at bedtime  insulin lispro (ADMELOG) corrective regimen sliding scale   SubCutaneous three times a day before meals  insulin lispro (ADMELOG) corrective regimen sliding scale   SubCutaneous at bedtime  insulin lispro Injectable (ADMELOG) 10 Unit(s) SubCutaneous three times a day before meals  methylPREDNISolone sodium succinate Injectable 20 milliGRAM(s) IV Push two times a day  apixaban 2.5 milliGRAM(s) Oral every 12 hours  chlorhexidine 2% Cloths 1 Application(s) Topical daily  clopidogrel Tablet 75 milliGRAM(s) Oral daily  dextrose 10% Bolus 125 milliLiter(s) IV Bolus once  dextrose 5%. 1000 milliLiter(s) IV Continuous <Continuous>  dextrose 5%. 1000 milliLiter(s) IV Continuous <Continuous>  influenza  Vaccine (HIGH DOSE) 0.7 milliLiter(s) IntraMuscular once  silver sulfADIAZINE 1% Cream 1 Application(s) Topical daily  sodium chloride 0.9%. 1000 milliLiter(s) IV Continuous <Continuous>      REVIEW OF SYSTEMS:    CONSTITUTIONAL: No fever, weight loss, or fatigue  EYES: No eye pain, visual disturbances, or discharge  NECK: No pain or stiffness  RESPIRATORY: No cough, wheezing, chills or hemoptysis; No Shortness of Breath  CARDIOVASCULAR: No chest pain, palpitations, dizziness, or leg swelling  GASTROINTESTINAL: No abdominal or epigastric pain. No nausea, vomiting, or hematemesis; No diarrhea or constipation. No melena or hematochezia.  GENITOURINARY: No dysuria, frequency, hematuria, or incontinence  NEUROLOGICAL: No headaches, memory loss, loss of strength, numbness, or tremors  SKIN: No itching, burning, rashes, or lesions   LYMPH Nodes: No enlarged glands  MUSCULOSKELETAL: No joint pain or swelling; No muscle, back, or extremity pain  All other review of systems are negative.  	    PHYSICAL EXAM:  T(C): 36.7 (05-04-24 @ 04:11), Max: 36.9 (05-03-24 @ 11:17)  HR: 79 (05-04-24 @ 09:29) (76 - 95)  BP: 126/67 (05-04-24 @ 09:29) (116/58 - 134/61)  RR: 18 (05-04-24 @ 04:11) (18 - 18)  SpO2: 99% (05-04-24 @ 09:29) (96% - 99%)  Wt(kg): --  I&O's Summary    03 May 2024 07:01  -  04 May 2024 07:00  --------------------------------------------------------  IN: 960 mL / OUT: 1700 mL / NET: -740 mL    04 May 2024 07:01  -  04 May 2024 09:37  --------------------------------------------------------  IN: 360 mL / OUT: 400 mL / NET: -40 mL          PHYSICAL EXAM    Appearance: Normal	  HEENT:   Normal oral mucosa, PERRL, EOMI	  NECK: Soft and supple, No LAD, No JVD  Cardiovascular: Regular Rate and Rhythm, Normal S1 S2, No murmurs, No clicks, gallops or rubs  Respiratory: Lungs clear to auscultation	  Extremities: No clubbing, cyanosis or edema    LABS:	 	               9.1    23.49 )-----------( 397      ( 04 May 2024 06:43 )             28.7     05-04    138  |  104  |  53<H>  ----------------------------<  135<H>  4.0   |  23  |  1.40<H>    Ca    7.4<L>      04 May 2024 06:43  Phos  3.0     05-03  Mg     2.1     05-03    TPro  4.1<L>  /  Alb  2.6<L>  /  TBili  0.5  /  DBili  x   /  AST  313<H>  /  ALT  553<H>  /  AlkPhos  96  05-04

## 2024-05-04 NOTE — PROGRESS NOTE ADULT - SUBJECTIVE AND OBJECTIVE BOX
Date of service: 24 @ 23:20      Patient is a 69y old  Male who presents with a chief complaint of Rhabdo (04 May 2024 11:37)                                                               INTERVAL HPI/OVERNIGHT EVENTS:    REVIEW OF SYSTEMS:     CONSTITUTIONAL: No weakness, fevers or chills  RESPIRATORY: No cough, wheezing,  No shortness of breath  CARDIOVASCULAR: No chest pain or palpitations  GASTROINTESTINAL: No abdominal pain  . No nausea, vomiting, or hematemesis; No diarrhea or constipation. No melena or hematochezia.  GENITOURINARY: No dysuria, frequency or hematuria  NEUROLOGICAL: No numbness or weakness                                                                                                                                                                                                                                                                           Medications:  MEDICATIONS  (STANDING):  apixaban 2.5 milliGRAM(s) Oral every 12 hours  chlorhexidine 2% Cloths 1 Application(s) Topical daily  clopidogrel Tablet 75 milliGRAM(s) Oral daily  dextrose 10% Bolus 125 milliLiter(s) IV Bolus once  dextrose 5%. 1000 milliLiter(s) (100 mL/Hr) IV Continuous <Continuous>  dextrose 5%. 1000 milliLiter(s) (50 mL/Hr) IV Continuous <Continuous>  dextrose 50% Injectable 12.5 Gram(s) IV Push once  dextrose 50% Injectable 25 Gram(s) IV Push once  furosemide   Injectable 40 milliGRAM(s) IV Push daily  glucagon  Injectable 1 milliGRAM(s) IntraMuscular once  influenza  Vaccine (HIGH DOSE) 0.7 milliLiter(s) IntraMuscular once  insulin glargine Injectable (LANTUS) 26 Unit(s) SubCutaneous at bedtime  insulin lispro (ADMELOG) corrective regimen sliding scale   SubCutaneous at bedtime  insulin lispro (ADMELOG) corrective regimen sliding scale   SubCutaneous three times a day before meals  insulin lispro Injectable (ADMELOG) 10 Unit(s) SubCutaneous three times a day before meals  isosorbide   dinitrate Tablet (ISORDIL) 30 milliGRAM(s) Oral three times a day  methylPREDNISolone sodium succinate Injectable 20 milliGRAM(s) IV Push two times a day  metoprolol tartrate 50 milliGRAM(s) Oral two times a day  polyethylene glycol 3350 17 Gram(s) Oral daily  senna 2 Tablet(s) Oral at bedtime  silver sulfADIAZINE 1% Cream 1 Application(s) Topical daily  sodium chloride 0.9%. 1000 milliLiter(s) (75 mL/Hr) IV Continuous <Continuous>    MEDICATIONS  (PRN):  acetaminophen     Tablet .. 650 milliGRAM(s) Oral every 6 hours PRN Temp greater or equal to 38C (100.4F), Mild Pain (1 - 3)  dextrose Oral Gel 15 Gram(s) Oral once PRN Blood Glucose LESS THAN 70 milliGRAM(s)/deciliter       Allergies    atorvastatin (Muscle Pain (Severe))    Intolerances      Vital Signs Last 24 Hrs  T(C): 36.7 (04 May 2024 20:32), Max: 36.7 (04 May 2024 04:11)  T(F): 98.1 (04 May 2024 20:32), Max: 98.1 (04 May 2024 04:11)  HR: 82 (04 May 2024 20:32) (76 - 93)  BP: 149/66 (04 May 2024 20:32) (121/70 - 149/66)  BP(mean): --  RR: 18 (04 May 2024 20:32) (18 - 18)  SpO2: 96% (04 May 2024 20:32) (96% - 100%)    Parameters below as of 04 May 2024 20:32  Patient On (Oxygen Delivery Method): room air      CAPILLARY BLOOD GLUCOSE      POCT Blood Glucose.: 219 mg/dL (04 May 2024 21:05)  POCT Blood Glucose.: 129 mg/dL (04 May 2024 16:33)  POCT Blood Glucose.: 94 mg/dL (04 May 2024 11:59)  POCT Blood Glucose.: 147 mg/dL (04 May 2024 08:05)       @ 07: @ 07:00  --------------------------------------------------------  IN: 960 mL / OUT: 1700 mL / NET: -740 mL     @ 07:  -   @ 23:20  --------------------------------------------------------  IN: 1860 mL / OUT: 1000 mL / NET: 860 mL      Physical Exam:    Daily     Daily Weight in k.6 (04 May 2024 09:08)  General:  Well appearing, NAD, not cachetic  HEENT:  Nonicteric, PERRLA  CV:  RRR, S1S2   Lungs:  CTA B/L, no wheezes, rales, rhonchi  Abdomen:  Soft, non-tender, no distended, positive BS  Extremities:   edema                                                                                                                                                                                                                                                                            LABS:                               9.1    23.49 )-----------( 397      ( 04 May 2024 06:43 )             28.7                      05-04    138  |  104  |  53<H>  ----------------------------<  135<H>  4.0   |  23  |  1.40<H>    Ca    7.4<L>      04 May 2024 06:43  Phos  3.0     05-03  Mg     1.8     05-04    TPro  4.1<L>  /  Alb  2.6<L>  /  TBili  0.5  /  DBili  x   /  AST  313<H>  /  ALT  553<H>  /  AlkPhos  96  05-04                       RADIOLOGY & ADDITIONAL TESTS         I personally reviewed: [  ]EKG   [  ]CXR    [  ] CT      A/P:         Discussed with :     Derek consultants' Notes   Time spent :

## 2024-05-04 NOTE — PROGRESS NOTE ADULT - SUBJECTIVE AND OBJECTIVE BOX
Chief complaint    Patient is a 69y old  Male who presents with a chief complaint of Rhabdo (03 May 2024 16:07)   Review of systems  Patient appears comfortable.    Labs and Fingersticks  CAPILLARY BLOOD GLUCOSE      POCT Blood Glucose.: 147 mg/dL (04 May 2024 08:05)  POCT Blood Glucose.: 107 mg/dL (03 May 2024 21:08)  POCT Blood Glucose.: 196 mg/dL (03 May 2024 16:41)  POCT Blood Glucose.: 198 mg/dL (03 May 2024 12:13)      Anion Gap: 11 (05-04 @ 06:43)  Anion Gap: 11 (05-03 @ 22:35)  Anion Gap: 11 (05-03 @ 05:57)      Calcium: 7.4 *L* (05-04 @ 06:43)  Calcium: 8.5 (05-03 @ 22:35)  Calcium: 9.1 (05-03 @ 05:57)  Albumin: 2.6 *L* (05-04 @ 06:43)    Alanine Aminotransferase (ALT/SGPT): 553 *H* (05-04 @ 06:43)  Alkaline Phosphatase: 96 (05-04 @ 06:43)  Aspartate Aminotransferase (AST/SGOT): 313 *H* (05-04 @ 06:43)        05-04    138  |  104  |  53<H>  ----------------------------<  135<H>  4.0   |  23  |  1.40<H>    Ca    7.4<L>      04 May 2024 06:43  Phos  3.0     05-03  Mg     2.1     05-03    TPro  4.1<L>  /  Alb  2.6<L>  /  TBili  0.5  /  DBili  x   /  AST  313<H>  /  ALT  553<H>  /  AlkPhos  96  05-04                        9.1    23.49 )-----------( 397      ( 04 May 2024 06:43 )             28.7     Medications  MEDICATIONS  (STANDING):  apixaban 2.5 milliGRAM(s) Oral every 12 hours  chlorhexidine 2% Cloths 1 Application(s) Topical daily  clopidogrel Tablet 75 milliGRAM(s) Oral daily  dextrose 10% Bolus 125 milliLiter(s) IV Bolus once  dextrose 5%. 1000 milliLiter(s) (100 mL/Hr) IV Continuous <Continuous>  dextrose 5%. 1000 milliLiter(s) (50 mL/Hr) IV Continuous <Continuous>  dextrose 50% Injectable 12.5 Gram(s) IV Push once  dextrose 50% Injectable 25 Gram(s) IV Push once  furosemide   Injectable 40 milliGRAM(s) IV Push daily  glucagon  Injectable 1 milliGRAM(s) IntraMuscular once  influenza  Vaccine (HIGH DOSE) 0.7 milliLiter(s) IntraMuscular once  insulin glargine Injectable (LANTUS) 26 Unit(s) SubCutaneous at bedtime  insulin lispro (ADMELOG) corrective regimen sliding scale   SubCutaneous three times a day before meals  insulin lispro (ADMELOG) corrective regimen sliding scale   SubCutaneous at bedtime  insulin lispro Injectable (ADMELOG) 10 Unit(s) SubCutaneous three times a day before meals  isosorbide   dinitrate Tablet (ISORDIL) 30 milliGRAM(s) Oral three times a day  methylPREDNISolone sodium succinate Injectable 20 milliGRAM(s) IV Push two times a day  metoprolol tartrate 25 milliGRAM(s) Oral two times a day  polyethylene glycol 3350 17 Gram(s) Oral daily  senna 2 Tablet(s) Oral at bedtime  silver sulfADIAZINE 1% Cream 1 Application(s) Topical daily  sodium chloride 0.9%. 1000 milliLiter(s) (75 mL/Hr) IV Continuous <Continuous>      Physical Exam  General: Patient appears comfortable.  Vital Signs Last 12 Hrs  T(F): 97 (05-04-24 @ 10:57), Max: 98.1 (05-04-24 @ 04:11)  HR: 90 (05-04-24 @ 10:57) (76 - 90)  BP: 130/69 (05-04-24 @ 10:57) (121/70 - 130/69)  BP(mean): --  RR: 18 (05-04-24 @ 10:57) (18 - 18)  SpO2: 99% (05-04-24 @ 10:57) (96% - 99%)  Neck: No palpable thyroid nodules.  CVS: S1S2, No murmurs  Respiratory: No wheezing, no crepitations  GI: Abdomen soft, non tender.    Diagnostics        Radiology:

## 2024-05-04 NOTE — PROGRESS NOTE ADULT - ASSESSMENT
#ASHD s/p multiple pci.   #A-fib  #HTN  #HL  #T2DM  #CKD   #Rhabdomyolysis  Await muscle biopsy result.   Continue hydration.

## 2024-05-05 LAB
ALBUMIN SERPL ELPH-MCNC: 2.5 G/DL — LOW (ref 3.3–5)
ALP SERPL-CCNC: 116 U/L — SIGNIFICANT CHANGE UP (ref 40–120)
ALT FLD-CCNC: 523 U/L — HIGH (ref 10–45)
ANION GAP SERPL CALC-SCNC: 11 MMOL/L — SIGNIFICANT CHANGE UP (ref 5–17)
AST SERPL-CCNC: 283 U/L — HIGH (ref 10–40)
BASOPHILS # BLD AUTO: 0.02 K/UL — SIGNIFICANT CHANGE UP (ref 0–0.2)
BASOPHILS NFR BLD AUTO: 0.1 % — SIGNIFICANT CHANGE UP (ref 0–2)
BILIRUB SERPL-MCNC: 0.6 MG/DL — SIGNIFICANT CHANGE UP (ref 0.2–1.2)
BUN SERPL-MCNC: 61 MG/DL — HIGH (ref 7–23)
CALCIUM SERPL-MCNC: 8.2 MG/DL — LOW (ref 8.4–10.5)
CHLORIDE SERPL-SCNC: 103 MMOL/L — SIGNIFICANT CHANGE UP (ref 96–108)
CK SERPL-CCNC: 2981 U/L — HIGH (ref 30–200)
CO2 SERPL-SCNC: 22 MMOL/L — SIGNIFICANT CHANGE UP (ref 22–31)
CREAT SERPL-MCNC: 1.43 MG/DL — HIGH (ref 0.5–1.3)
EGFR: 54 ML/MIN/1.73M2 — LOW
EOSINOPHIL # BLD AUTO: 0 K/UL — SIGNIFICANT CHANGE UP (ref 0–0.5)
EOSINOPHIL NFR BLD AUTO: 0 % — SIGNIFICANT CHANGE UP (ref 0–6)
GLUCOSE BLDC GLUCOMTR-MCNC: 199 MG/DL — HIGH (ref 70–99)
GLUCOSE BLDC GLUCOMTR-MCNC: 234 MG/DL — HIGH (ref 70–99)
GLUCOSE BLDC GLUCOMTR-MCNC: 246 MG/DL — HIGH (ref 70–99)
GLUCOSE BLDC GLUCOMTR-MCNC: 258 MG/DL — HIGH (ref 70–99)
GLUCOSE SERPL-MCNC: 293 MG/DL — HIGH (ref 70–99)
HCT VFR BLD CALC: 27.5 % — LOW (ref 39–50)
HGB BLD-MCNC: 8.9 G/DL — LOW (ref 13–17)
IMM GRANULOCYTES NFR BLD AUTO: 1.8 % — HIGH (ref 0–0.9)
LYMPHOCYTES # BLD AUTO: 0.77 K/UL — LOW (ref 1–3.3)
LYMPHOCYTES # BLD AUTO: 3.7 % — LOW (ref 13–44)
MCHC RBC-ENTMCNC: 28.5 PG — SIGNIFICANT CHANGE UP (ref 27–34)
MCHC RBC-ENTMCNC: 32.4 GM/DL — SIGNIFICANT CHANGE UP (ref 32–36)
MCV RBC AUTO: 88.1 FL — SIGNIFICANT CHANGE UP (ref 80–100)
MONOCYTES # BLD AUTO: 1.12 K/UL — HIGH (ref 0–0.9)
MONOCYTES NFR BLD AUTO: 5.4 % — SIGNIFICANT CHANGE UP (ref 2–14)
NEUTROPHILS # BLD AUTO: 18.41 K/UL — HIGH (ref 1.8–7.4)
NEUTROPHILS NFR BLD AUTO: 89 % — HIGH (ref 43–77)
NRBC # BLD: 0 /100 WBCS — SIGNIFICANT CHANGE UP (ref 0–0)
PLATELET # BLD AUTO: 369 K/UL — SIGNIFICANT CHANGE UP (ref 150–400)
POTASSIUM SERPL-MCNC: 4.5 MMOL/L — SIGNIFICANT CHANGE UP (ref 3.5–5.3)
POTASSIUM SERPL-SCNC: 4.5 MMOL/L — SIGNIFICANT CHANGE UP (ref 3.5–5.3)
PROT SERPL-MCNC: 4.6 G/DL — LOW (ref 6–8.3)
RBC # BLD: 3.12 M/UL — LOW (ref 4.2–5.8)
RBC # FLD: 16.6 % — HIGH (ref 10.3–14.5)
SODIUM SERPL-SCNC: 136 MMOL/L — SIGNIFICANT CHANGE UP (ref 135–145)
WBC # BLD: 20.7 K/UL — HIGH (ref 3.8–10.5)
WBC # FLD AUTO: 20.7 K/UL — HIGH (ref 3.8–10.5)

## 2024-05-05 RX ORDER — SODIUM CHLORIDE 9 MG/ML
1000 INJECTION INTRAMUSCULAR; INTRAVENOUS; SUBCUTANEOUS
Refills: 0 | Status: DISCONTINUED | OUTPATIENT
Start: 2024-05-05 | End: 2024-05-06

## 2024-05-05 RX ORDER — INSULIN LISPRO 100/ML
8 VIAL (ML) SUBCUTANEOUS
Refills: 0 | Status: DISCONTINUED | OUTPATIENT
Start: 2024-05-05 | End: 2024-05-06

## 2024-05-05 RX ORDER — INSULIN GLARGINE 100 [IU]/ML
20 INJECTION, SOLUTION SUBCUTANEOUS AT BEDTIME
Refills: 0 | Status: DISCONTINUED | OUTPATIENT
Start: 2024-05-05 | End: 2024-05-06

## 2024-05-05 RX ORDER — NITROGLYCERIN 6.5 MG
0.4 CAPSULE, EXTENDED RELEASE ORAL
Refills: 0 | Status: DISCONTINUED | OUTPATIENT
Start: 2024-05-05 | End: 2024-05-05

## 2024-05-05 RX ADMIN — Medication 650 MILLIGRAM(S): at 22:11

## 2024-05-05 RX ADMIN — Medication 50 MILLIGRAM(S): at 06:24

## 2024-05-05 RX ADMIN — ISOSORBIDE DINITRATE 30 MILLIGRAM(S): 5 TABLET ORAL at 13:54

## 2024-05-05 RX ADMIN — INSULIN GLARGINE 20 UNIT(S): 100 INJECTION, SOLUTION SUBCUTANEOUS at 21:40

## 2024-05-05 RX ADMIN — APIXABAN 2.5 MILLIGRAM(S): 2.5 TABLET, FILM COATED ORAL at 18:28

## 2024-05-05 RX ADMIN — Medication 650 MILLIGRAM(S): at 20:21

## 2024-05-05 RX ADMIN — ISOSORBIDE DINITRATE 30 MILLIGRAM(S): 5 TABLET ORAL at 18:25

## 2024-05-05 RX ADMIN — SODIUM CHLORIDE 40 MILLILITER(S): 9 INJECTION INTRAMUSCULAR; INTRAVENOUS; SUBCUTANEOUS at 08:41

## 2024-05-05 RX ADMIN — Medication 20 MILLIGRAM(S): at 06:35

## 2024-05-05 RX ADMIN — Medication 40 MILLIGRAM(S): at 06:31

## 2024-05-05 RX ADMIN — Medication 8 UNIT(S): at 08:37

## 2024-05-05 RX ADMIN — Medication 20 MILLIGRAM(S): at 18:26

## 2024-05-05 RX ADMIN — Medication 3: at 16:42

## 2024-05-05 RX ADMIN — Medication 50 MILLIGRAM(S): at 18:27

## 2024-05-05 RX ADMIN — Medication 8 UNIT(S): at 16:41

## 2024-05-05 RX ADMIN — ISOSORBIDE DINITRATE 30 MILLIGRAM(S): 5 TABLET ORAL at 08:40

## 2024-05-05 RX ADMIN — Medication 2: at 08:37

## 2024-05-05 RX ADMIN — Medication 650 MILLIGRAM(S): at 07:20

## 2024-05-05 RX ADMIN — Medication 1: at 13:48

## 2024-05-05 RX ADMIN — Medication 650 MILLIGRAM(S): at 06:24

## 2024-05-05 RX ADMIN — APIXABAN 2.5 MILLIGRAM(S): 2.5 TABLET, FILM COATED ORAL at 06:23

## 2024-05-05 RX ADMIN — Medication 8 UNIT(S): at 13:47

## 2024-05-05 RX ADMIN — CLOPIDOGREL BISULFATE 75 MILLIGRAM(S): 75 TABLET, FILM COATED ORAL at 13:55

## 2024-05-05 RX ADMIN — CHLORHEXIDINE GLUCONATE 1 APPLICATION(S): 213 SOLUTION TOPICAL at 13:55

## 2024-05-05 NOTE — PROGRESS NOTE ADULT - SUBJECTIVE AND OBJECTIVE BOX
Patient seen and examined at bedside. No events noted overnight. Resting comfortably in the chair ordering breakfast. Remains on IVF's        VITAL:  T(C): , Max: 36.7 (05-04-24 @ 20:32)  T(F): , Max: 98.1 (05-04-24 @ 20:32)  HR: 68 (05-05-24 @ 05:11)  BP: 112/69 (05-05-24 @ 05:11)  BP(mean): --  RR: 18 (05-05-24 @ 05:11)  SpO2: 98% (05-05-24 @ 05:11)  Wt(kg): --      PHYSICAL EXAM:  Constitutional: alert, NAD  HEENT: NCAT, MMM  Neck: Supple, No JVD  Respiratory: CTA-b/l  Cardiovascular: RRR s1s2, no m/r/g  Gastrointestinal: BS+, soft, NT/ND  Extremities: (+)b/l LE edema/mild tenderness  Neurological: no focal deficits; strength grossly intact  Back: no CVAT b/l  Skin: No rashes, no nevi      LABS:                        8.9    20.70 )-----------( 369      ( 05 May 2024 05:34 )             27.5     Na(136)/K(4.5)/Cl(103)/HCO3(22)/BUN(61)/Cr(1.43)Glu(293)/Ca(8.2)/Mg(--)/PO4(--)    05-05 @ 05:34  Na(138)/K(4.0)/Cl(104)/HCO3(23)/BUN(53)/Cr(1.40)Glu(135)/Ca(7.4)/Mg(1.8)/PO4(--)    05-04 @ 06:43  Na(136)/K(4.8)/Cl(100)/HCO3(25)/BUN(64)/Cr(1.61)Glu(143)/Ca(8.5)/Mg(2.1)/PO4(3.0)    05-03 @ 22:35  Na(135)/K(4.8)/Cl(98)/HCO3(26)/BUN(65)/Cr(1.59)Glu(252)/Ca(9.1)/Mg(--)/PO4(--)    05-03 @ 05:57    Urinalysis Basic - ( 05 May 2024 05:34 )    Color: x / Appearance: x / SG: x / pH: x  Gluc: 293 mg/dL / Ketone: x  / Bili: x / Urobili: x   Blood: x / Protein: x / Nitrite: x   Leuk Esterase: x / RBC: x / WBC x   Sq Epi: x / Non Sq Epi: x / Bacteria: x

## 2024-05-05 NOTE — PROGRESS NOTE ADULT - ASSESSMENT
68 y/o man      h/o severe CAD S/P multiple PCI's including multiple LM and LAD stents.       +A-fib, HTN, HLD, T2DM, CKD3, former smoker     presents with  weaknss/   leg  pains.  no leg  weakness      + troponin /  elevated  cpk      CAD,  has   10  stents/  HTN/  HLD        cath,   4/18,  2.0 mm caliber TITO to distal LAD,Dr ILDA Bernal        on  dapt/  ef   47     troponin,  likely   demand  ischemia.  not  mi  per  dr arellano,,  not a  candidate   for  further intervention,   small  caliber  vessels   c/c  Afib,  on eliquis           rhabdomyolysis,/  elevated  CPK,    suspect  statin induced.  rhabdomyolysis/  form drug vs  polymyositis,    need  to  r/o  polymyositis, vs  drug induced   myositis,    mri  pelvis,  myositis/  fx  femoral condyle.  ortho  eval  noted,  rec immobilizer  pt refusing    ct a/p, neck, no  malignancy   await   bx   by   surg DR. palomino/  on iv  steroid  per   rheum   cont off statin   fu BX   follow  path  monitor  MACARENA   cont steroids .. ? change to po ...fu with rheum   fu bx       afib restarted eliquis     renal failure : monitor       elevated lfts,   abd  u/s,  normal liver       PAD/  will  f/p  a s an out pt with vascular. when stable   on asa/  eliquis, plavix, / lasix, imdur.  valsartan.  toprol      hold   Farxiga   and  valsartan for  now,  iv fluids,  per  renal   c/c  anemia            CAD : stent/on  4/17/24.   only   option , to   temporarily hold  eliquis  and   continue   plavix,   and   card  dr bravo and  dr brijesh bernal in  agreement    wbc  noted, from steroid,  cpk/  lfts decreasing     rehab               -

## 2024-05-05 NOTE — PROVIDER CONTACT NOTE (OTHER) - ACTION/TREATMENT ORDERED:
Nitro 0.4mg SL ordered by ANP. Pt. Refused nitro SL at present. Pt. says, he feels better. cont. to monitor pt.

## 2024-05-05 NOTE — PROGRESS NOTE ADULT - ASSESSMENT
#ASHD s/p multiple pci.   #A-fib  #HTN  #HL  #T2DM  #CKD   #Rhabdomyolysis, ck lower 2981.   Await muscle biopsy result.   Continue hydration.

## 2024-05-05 NOTE — PROGRESS NOTE ADULT - SUBJECTIVE AND OBJECTIVE BOX
SURGERY PROGRESS NOTE    SUBJECTIVE / 24H EVENTS:  Patient seen and examined on morning rounds. No acute events overnight.      OBJECTIVE:  VITAL SIGNS:  T(C): 36.6 (24 @ 16:10), Max: 36.7 (24 @ 20:32)  HR: 93 (24 @ 16:10) (68 - 93)  BP: 123/65 (24 @ 16:10) (112/69 - 149/66)  RR: 18 (24 @ 16:10) (18 - 18)  SpO2: 94% (24 @ 16:10) (94% - 98%)  Daily     Daily Weight in k.6 (05 May 2024 08:15)  POCT Blood Glucose.: 258 mg/dL (24 @ 16:31)  POCT Blood Glucose.: 199 mg/dL (24 @ 13:37)  POCT Blood Glucose.: 234 mg/dL (24 @ 07:40)      PHYSICAL EXAM:  Gen: NAD  LS: Respirations unlabored   GI: Soft. Nontender. Nondistended. BS+.  Ext: Warm, well perfused      24 @ 07:01  -  24 @ 07:00  --------------------------------------------------------  IN:    Oral Fluid: 960 mL    sodium chloride 0.9%: 900 mL  Total IN: 1860 mL    OUT:    Voided (mL): 1900 mL  Total OUT: 1900 mL    Total NET: -40 mL      24 @ 07:01  -  24 @ 18:11  --------------------------------------------------------  IN:  Total IN: 0 mL    OUT:    Voided (mL): 825 mL  Total OUT: 825 mL    Total NET: -825 mL          LAB VALUES:      136  |  103  |  61<H>  ----------------------------<  293<H>  4.5   |  22  |  1.43<H>    Ca    8.2<L>      05 May 2024 05:34  Phos  3.0     05-03  Mg     1.8     05-    TPro  4.6<L>  /  Alb  2.5<L>  /  TBili  0.6  /  DBili  x   /  AST  283<H>  /  ALT  523<H>  /  AlkPhos  116                                 8.9    20.70 )-----------( 369      ( 05 May 2024 05:34 )             27.5     LIVER FUNCTIONS - ( 05 May 2024 05:34 )  Alb: 2.5 g/dL / Pro: 4.6 g/dL / ALK PHOS: 116 U/L / ALT: 523 U/L / AST: 283 U/L / GGT: x               CARDIAC MARKERS ( 05 May 2024 05:34 )  x     / x     / 2981 U/L / x     / x      CARDIAC MARKERS ( 04 May 2024 06:43 )  x     / x     / 3465 U/L / x     / x          Urinalysis Basic - ( 05 May 2024 05:34 )    Color: x / Appearance: x / SG: x / pH: x  Gluc: 293 mg/dL / Ketone: x  / Bili: x / Urobili: x   Blood: x / Protein: x / Nitrite: x   Leuk Esterase: x / RBC: x / WBC x   Sq Epi: x / Non Sq Epi: x / Bacteria: x        MEDICATIONS  (STANDING):  apixaban 2.5 milliGRAM(s) Oral every 12 hours  chlorhexidine 2% Cloths 1 Application(s) Topical daily  clopidogrel Tablet 75 milliGRAM(s) Oral daily  dextrose 10% Bolus 125 milliLiter(s) IV Bolus once  dextrose 5%. 1000 milliLiter(s) (50 mL/Hr) IV Continuous <Continuous>  dextrose 5%. 1000 milliLiter(s) (100 mL/Hr) IV Continuous <Continuous>  dextrose 50% Injectable 12.5 Gram(s) IV Push once  dextrose 50% Injectable 25 Gram(s) IV Push once  furosemide   Injectable 40 milliGRAM(s) IV Push daily  glucagon  Injectable 1 milliGRAM(s) IntraMuscular once  influenza  Vaccine (HIGH DOSE) 0.7 milliLiter(s) IntraMuscular once  insulin glargine Injectable (LANTUS) 20 Unit(s) SubCutaneous at bedtime  insulin lispro (ADMELOG) corrective regimen sliding scale   SubCutaneous three times a day before meals  insulin lispro (ADMELOG) corrective regimen sliding scale   SubCutaneous at bedtime  insulin lispro Injectable (ADMELOG) 8 Unit(s) SubCutaneous three times a day before meals  isosorbide   dinitrate Tablet (ISORDIL) 30 milliGRAM(s) Oral three times a day  methylPREDNISolone sodium succinate Injectable 20 milliGRAM(s) IV Push two times a day  metoprolol tartrate 50 milliGRAM(s) Oral two times a day  polyethylene glycol 3350 17 Gram(s) Oral daily  senna 2 Tablet(s) Oral at bedtime  silver sulfADIAZINE 1% Cream 1 Application(s) Topical daily  sodium chloride 0.9%. 1000 milliLiter(s) (40 mL/Hr) IV Continuous <Continuous>    MEDICATIONS  (PRN):  acetaminophen     Tablet .. 650 milliGRAM(s) Oral every 6 hours PRN Temp greater or equal to 38C (100.4F), Mild Pain (1 - 3)  dextrose Oral Gel 15 Gram(s) Oral once PRN Blood Glucose LESS THAN 70 milliGRAM(s)/deciliter

## 2024-05-05 NOTE — PROGRESS NOTE ADULT - SUBJECTIVE AND OBJECTIVE BOX
SUBJECTIVE: In chair, notes bilateral leg pains and stiffness. Ck continues to drop, today 2981.   	  MEDICATIONS:  furosemide   Injectable 40 milliGRAM(s) IV Push daily  isosorbide   dinitrate Tablet (ISORDIL) 30 milliGRAM(s) Oral three times a day  metoprolol tartrate 50 milliGRAM(s) Oral two times a day  acetaminophen     Tablet .. 650 milliGRAM(s) Oral every 6 hours PRN  polyethylene glycol 3350 17 Gram(s) Oral daily  senna 2 Tablet(s) Oral at bedtime  dextrose 50% Injectable 25 Gram(s) IV Push once  dextrose 50% Injectable 12.5 Gram(s) IV Push once  dextrose Oral Gel 15 Gram(s) Oral once PRN  glucagon  Injectable 1 milliGRAM(s) IntraMuscular once  insulin glargine Injectable (LANTUS) 20 Unit(s) SubCutaneous at bedtime  insulin lispro (ADMELOG) corrective regimen sliding scale   SubCutaneous three times a day before meals  insulin lispro (ADMELOG) corrective regimen sliding scale   SubCutaneous at bedtime  insulin lispro Injectable (ADMELOG) 8 Unit(s) SubCutaneous three times a day before meals  methylPREDNISolone sodium succinate Injectable 20 milliGRAM(s) IV Push two times a day  apixaban 2.5 milliGRAM(s) Oral every 12 hours  chlorhexidine 2% Cloths 1 Application(s) Topical daily  clopidogrel Tablet 75 milliGRAM(s) Oral daily  dextrose 10% Bolus 125 milliLiter(s) IV Bolus once  dextrose 5%. 1000 milliLiter(s) IV Continuous <Continuous>  dextrose 5%. 1000 milliLiter(s) IV Continuous <Continuous>  influenza  Vaccine (HIGH DOSE) 0.7 milliLiter(s) IntraMuscular once  silver sulfADIAZINE 1% Cream 1 Application(s) Topical daily  sodium chloride 0.9%. 1000 milliLiter(s) IV Continuous <Continuous>      REVIEW OF SYSTEMS:    CONSTITUTIONAL: No fever, weight loss, or fatigue  EYES: No eye pain, visual disturbances, or discharge  NECK: No pain or stiffness  RESPIRATORY: No cough, wheezing, chills or hemoptysis; No Shortness of Breath  CARDIOVASCULAR: No chest pain, palpitations, dizziness, or leg swelling  GASTROINTESTINAL: No abdominal or epigastric pain. No nausea, vomiting, or hematemesis; No diarrhea or constipation. No melena or hematochezia.  GENITOURINARY: No dysuria, frequency, hematuria, or incontinence  NEUROLOGICAL: No headaches, memory loss, loss of strength, numbness, or tremors  SKIN: No itching, burning, rashes, or lesions   LYMPH Nodes: No enlarged glands  MUSCULOSKELETAL: +bilateral leg pain and stiffness  All other review of systems are negative.  	      PHYSICAL EXAM:  T(C): 36.3 (05-05-24 @ 05:11), Max: 36.7 (05-04-24 @ 20:32)  HR: 68 (05-05-24 @ 05:11) (68 - 93)  BP: 112/69 (05-05-24 @ 05:11) (112/69 - 149/66)  RR: 18 (05-05-24 @ 05:11) (18 - 18)  SpO2: 98% (05-05-24 @ 05:11) (96% - 100%)  Wt(kg): --  I&O's Summary    04 May 2024 07:01  -  05 May 2024 07:00  --------------------------------------------------------  IN: 1860 mL / OUT: 1900 mL / NET: -40 mL          PHYSICAL EXAM    Appearance: Normal	  HEENT:   Normal oral mucosa, PERRL, EOMI	  NECK: Soft and supple, No LAD, No JVD  Cardiovascular: Regular Rate and Rhythm, Normal S1 S2, No murmurs, No clicks, gallops or rubs  Respiratory: Lungs clear to auscultation	  Extremities: No clubbing, cyanosis or edema    LABS:	 	               8.9    20.70 )-----------( 369      ( 05 May 2024 05:34 )             27.5     05-05    136  |  103  |  61<H>  ----------------------------<  293<H>  4.5   |  22  |  1.43<H>    Ca    8.2<L>      05 May 2024 05:34  Phos  3.0     05-03  Mg     1.8     05-04    TPro  4.6<L>  /  Alb  2.5<L>  /  TBili  0.6  /  DBili  x   /  AST  283<H>  /  ALT  523<H>  /  AlkPhos  116  05-05

## 2024-05-05 NOTE — PROGRESS NOTE ADULT - ASSESSMENT
ASSESSMENT/RECOMMENDATIONS: 69y Male patient S/P right posterior thigh/biceps femoris muscle biopsy for rhabdomyolysis work up. Path still pending. Restarted eliquis 05/03 PM. H/H stable. Hematoma soft    Plan:  - Pain control PRN  - Diet: Regular  - Activity: OOBAT  - DVT ppx: SCD's  - Excellent care per primary  - Please re-page as needed    Trauma/ACS  [id number].

## 2024-05-05 NOTE — PROGRESS NOTE ADULT - SUBJECTIVE AND OBJECTIVE BOX
Chief complaint    Patient is a 69y old  Male who presents with a chief complaint of Rhabdo (05 May 2024 07:46)   Review of systems  Patient appears comfortable.    Labs and Fingersticks  CAPILLARY BLOOD GLUCOSE      POCT Blood Glucose.: 234 mg/dL (05 May 2024 07:40)  POCT Blood Glucose.: 219 mg/dL (04 May 2024 21:05)  POCT Blood Glucose.: 129 mg/dL (04 May 2024 16:33)  POCT Blood Glucose.: 94 mg/dL (04 May 2024 11:59)      Anion Gap: 11 (05-05 @ 05:34)  Anion Gap: 11 (05-04 @ 06:43)  Anion Gap: 11 (05-03 @ 22:35)      Calcium: 8.2 *L* (05-05 @ 05:34)  Calcium: 7.4 *L* (05-04 @ 06:43)  Calcium: 8.5 (05-03 @ 22:35)  Albumin: 2.5 *L* (05-05 @ 05:34)  Albumin: 2.6 *L* (05-04 @ 06:43)    Alanine Aminotransferase (ALT/SGPT): 523 *H* (05-05 @ 05:34)  Alanine Aminotransferase (ALT/SGPT): 553 *H* (05-04 @ 06:43)  Alkaline Phosphatase: 116 (05-05 @ 05:34)  Alkaline Phosphatase: 96 (05-04 @ 06:43)  Aspartate Aminotransferase (AST/SGOT): 283 *H* (05-05 @ 05:34)  Aspartate Aminotransferase (AST/SGOT): 313 *H* (05-04 @ 06:43)        05-05    136  |  103  |  61<H>  ----------------------------<  293<H>  4.5   |  22  |  1.43<H>    Ca    8.2<L>      05 May 2024 05:34  Phos  3.0     05-03  Mg     1.8     05-04    TPro  4.6<L>  /  Alb  2.5<L>  /  TBili  0.6  /  DBili  x   /  AST  283<H>  /  ALT  523<H>  /  AlkPhos  116  05-05                        8.9    20.70 )-----------( 369      ( 05 May 2024 05:34 )             27.5     Medications  MEDICATIONS  (STANDING):  apixaban 2.5 milliGRAM(s) Oral every 12 hours  chlorhexidine 2% Cloths 1 Application(s) Topical daily  clopidogrel Tablet 75 milliGRAM(s) Oral daily  dextrose 10% Bolus 125 milliLiter(s) IV Bolus once  dextrose 5%. 1000 milliLiter(s) (50 mL/Hr) IV Continuous <Continuous>  dextrose 5%. 1000 milliLiter(s) (100 mL/Hr) IV Continuous <Continuous>  dextrose 50% Injectable 25 Gram(s) IV Push once  dextrose 50% Injectable 12.5 Gram(s) IV Push once  furosemide   Injectable 40 milliGRAM(s) IV Push daily  glucagon  Injectable 1 milliGRAM(s) IntraMuscular once  influenza  Vaccine (HIGH DOSE) 0.7 milliLiter(s) IntraMuscular once  insulin glargine Injectable (LANTUS) 20 Unit(s) SubCutaneous at bedtime  insulin lispro (ADMELOG) corrective regimen sliding scale   SubCutaneous three times a day before meals  insulin lispro (ADMELOG) corrective regimen sliding scale   SubCutaneous at bedtime  insulin lispro Injectable (ADMELOG) 8 Unit(s) SubCutaneous three times a day before meals  isosorbide   dinitrate Tablet (ISORDIL) 30 milliGRAM(s) Oral three times a day  methylPREDNISolone sodium succinate Injectable 20 milliGRAM(s) IV Push two times a day  metoprolol tartrate 50 milliGRAM(s) Oral two times a day  polyethylene glycol 3350 17 Gram(s) Oral daily  senna 2 Tablet(s) Oral at bedtime  silver sulfADIAZINE 1% Cream 1 Application(s) Topical daily  sodium chloride 0.9%. 1000 milliLiter(s) (40 mL/Hr) IV Continuous <Continuous>      Physical Exam  General: Patient appears comfortable.  Vital Signs Last 12 Hrs  T(F): 97.5 (05-05-24 @ 11:22), Max: 97.5 (05-05-24 @ 11:22)  HR: 68 (05-05-24 @ 11:22) (68 - 68)  BP: 123/58 (05-05-24 @ 11:22) (112/69 - 123/58)  BP(mean): --  RR: 18 (05-05-24 @ 11:22) (18 - 18)  SpO2: 97% (05-05-24 @ 11:22) (97% - 98%)  Neck: No palpable thyroid nodules.  CVS: S1S2, No murmurs  Respiratory: No wheezing, no crepitations  GI: Abdomen soft, non tender.    Diagnostics        Radiology:

## 2024-05-05 NOTE — PROGRESS NOTE ADULT - ASSESSMENT
IMPRESSION: 69M w/ HTN, DM2, CAD, AFib, HFrEF, and CKD, s/p recent PCI, 4/25/24 returned with acute rhabdomyolysis    (1)Renal - CKD3b - due to diabetes/hypertension - at/near baseline  (2)Rhabdo - Improving on IVF's  (3)Metabolic acidosis - resolved  (4)Rheum - 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 - Surgery and Rheum on board- s/p muscle biopsy 5/2-> follow path    RECOMMEND:  (1)Decreased NS to 40ml/hr  (2)Lasix 40mg iv daily as ordered  (3)F/U BMP+Mg+CPK daily;       Becka Hinojosa NP  [facility]  Office/on call physician: [phone]

## 2024-05-05 NOTE — PROGRESS NOTE ADULT - SUBJECTIVE AND OBJECTIVE BOX
Date of service: 24 @ 07:19      Patient is a 69y old  Male who presents with a chief complaint of Rhabdo (04 May 2024 23:19)                                                               INTERVAL HPI/OVERNIGHT EVENTS:    REVIEW OF SYSTEMS:     CONSTITUTIONAL: No weakness, fevers or chills  EYES/ENT: No visual changes , no ear ache   NECK: No pain or stiffness  RESPIRATORY: No cough, wheezing,  No shortness of breath  CARDIOVASCULAR: No chest pain or palpitations  GASTROINTESTINAL: No abdominal pain  . No nausea, vomiting, or hematemesis; No diarrhea or constipation. No melena or hematochezia.  GENITOURINARY: No dysuria, frequency or hematuria  NEUROLOGICAL: No numbness or weakness  SKIN: No itching, burning, rashes, or lesions                                                                                                                                                                                                                                                                                 Medications:  MEDICATIONS  (STANDING):  apixaban 2.5 milliGRAM(s) Oral every 12 hours  chlorhexidine 2% Cloths 1 Application(s) Topical daily  clopidogrel Tablet 75 milliGRAM(s) Oral daily  dextrose 10% Bolus 125 milliLiter(s) IV Bolus once  dextrose 5%. 1000 milliLiter(s) (100 mL/Hr) IV Continuous <Continuous>  dextrose 5%. 1000 milliLiter(s) (50 mL/Hr) IV Continuous <Continuous>  dextrose 50% Injectable 25 Gram(s) IV Push once  dextrose 50% Injectable 12.5 Gram(s) IV Push once  furosemide   Injectable 40 milliGRAM(s) IV Push daily  glucagon  Injectable 1 milliGRAM(s) IntraMuscular once  influenza  Vaccine (HIGH DOSE) 0.7 milliLiter(s) IntraMuscular once  insulin glargine Injectable (LANTUS) 20 Unit(s) SubCutaneous at bedtime  insulin lispro (ADMELOG) corrective regimen sliding scale   SubCutaneous at bedtime  insulin lispro (ADMELOG) corrective regimen sliding scale   SubCutaneous three times a day before meals  insulin lispro Injectable (ADMELOG) 8 Unit(s) SubCutaneous three times a day before meals  isosorbide   dinitrate Tablet (ISORDIL) 30 milliGRAM(s) Oral three times a day  methylPREDNISolone sodium succinate Injectable 20 milliGRAM(s) IV Push two times a day  metoprolol tartrate 50 milliGRAM(s) Oral two times a day  polyethylene glycol 3350 17 Gram(s) Oral daily  senna 2 Tablet(s) Oral at bedtime  silver sulfADIAZINE 1% Cream 1 Application(s) Topical daily  sodium chloride 0.9%. 1000 milliLiter(s) (75 mL/Hr) IV Continuous <Continuous>    MEDICATIONS  (PRN):  acetaminophen     Tablet .. 650 milliGRAM(s) Oral every 6 hours PRN Temp greater or equal to 38C (100.4F), Mild Pain (1 - 3)  dextrose Oral Gel 15 Gram(s) Oral once PRN Blood Glucose LESS THAN 70 milliGRAM(s)/deciliter       Allergies    atorvastatin (Muscle Pain (Severe))    Intolerances      Vital Signs Last 24 Hrs  T(C): 36.3 (05 May 2024 05:11), Max: 36.7 (04 May 2024 20:32)  T(F): 97.4 (05 May 2024 05:11), Max: 98.1 (04 May 2024 20:32)  HR: 68 (05 May 2024 05:11) (68 - 93)  BP: 112/69 (05 May 2024 05:11) (112/69 - 149/66)  BP(mean): --  RR: 18 (05 May 2024 05:11) (18 - 18)  SpO2: 98% (05 May 2024 05:11) (96% - 100%)    Parameters below as of 05 May 2024 05:11  Patient On (Oxygen Delivery Method): room air      CAPILLARY BLOOD GLUCOSE      POCT Blood Glucose.: 219 mg/dL (04 May 2024 21:05)  POCT Blood Glucose.: 129 mg/dL (04 May 2024 16:33)  POCT Blood Glucose.: 94 mg/dL (04 May 2024 11:59)  POCT Blood Glucose.: 147 mg/dL (04 May 2024 08:05)      - @ 07:01  -  - @ 07:00  --------------------------------------------------------  IN: 1860 mL / OUT: 1900 mL / NET: -40 mL      Physical Exam:    Daily     Daily Weight in k.6 (04 May 2024 09:08)  General:  Well appearing, NAD, not cachetic  HEENT:  Nonicteric, PERRLA  CV:  RRR, S1S2   Lungs:  CTA B/L, no wheezes, rales, rhonchi  Abdomen:  Soft, non-tender, no distended, positive BS  Extremities: edema     Neuro:  AAOx3, non-focal, grossly intact                                                                                                                                                                                                                                                                                                LABS:                               8.9    20.70 )-----------( 369      ( 05 May 2024 05:34 )             27.5                      05-    136  |  103  |  61<H>  ----------------------------<  293<H>  4.5   |  22  |  1.43<H>    Ca    8.2<L>      05 May 2024 05:34  Phos  3.0     05-03  Mg     1.8     05-04    TPro  4.6<L>  /  Alb  2.5<L>  /  TBili  0.6  /  DBili  x   /  AST  283<H>  /  ALT  523<H>  /  AlkPhos  116  05-05                       RADIOLOGY & ADDITIONAL TESTS         I personally reviewed: [  ]EKG   [  ]CXR    [  ] CT      A/P:         Discussed with :     Derek consultants' Notes   Time spent :

## 2024-05-05 NOTE — PROGRESS NOTE ADULT - ASSESSMENT
70 y/o male with hx of CAD s/p multiple  stents (most recent to pLM (70%), pLAD, mLAD on 1/8/24 with Dr. Dominguez), HFrEF , A-fib, HTN, HLD, T2DM,  CKD3, former smoker  Assessment  DMT2: 69y Male with DM T2 with hyperglycemia, A1C 7.6% , was on oral meds and insulin at home  (Farxiga and Semglee basal insulin), now on basal bolus insulins, sugars are improving.  was started on steroids, having steroid induced hyperglycemias  CAD: on medications, stable, monitored.   HTN: on antihypertensive medications, monitored, asymptomatic.  CKD: Monitor labs/BMP, renal follow up. rhabdo, muscle bx jane Rossi MD  Cell: [phone]  Office: [phone]

## 2024-05-06 ENCOUNTER — TRANSCRIPTION ENCOUNTER (OUTPATIENT)
Age: 69
End: 2024-05-06

## 2024-05-06 LAB
ANION GAP SERPL CALC-SCNC: 12 MMOL/L — SIGNIFICANT CHANGE UP (ref 5–17)
BASOPHILS # BLD AUTO: 0.05 K/UL — SIGNIFICANT CHANGE UP (ref 0–0.2)
BASOPHILS NFR BLD AUTO: 0.2 % — SIGNIFICANT CHANGE UP (ref 0–2)
BUN SERPL-MCNC: 63 MG/DL — HIGH (ref 7–23)
CALCIUM SERPL-MCNC: 8.6 MG/DL — SIGNIFICANT CHANGE UP (ref 8.4–10.5)
CHLORIDE SERPL-SCNC: 102 MMOL/L — SIGNIFICANT CHANGE UP (ref 96–108)
CO2 SERPL-SCNC: 22 MMOL/L — SIGNIFICANT CHANGE UP (ref 22–31)
CREAT SERPL-MCNC: 1.34 MG/DL — HIGH (ref 0.5–1.3)
EGFR: 58 ML/MIN/1.73M2 — LOW
EOSINOPHIL # BLD AUTO: 0 K/UL — SIGNIFICANT CHANGE UP (ref 0–0.5)
EOSINOPHIL NFR BLD AUTO: 0 % — SIGNIFICANT CHANGE UP (ref 0–6)
GLUCOSE BLDC GLUCOMTR-MCNC: 257 MG/DL — HIGH (ref 70–99)
GLUCOSE BLDC GLUCOMTR-MCNC: 266 MG/DL — HIGH (ref 70–99)
GLUCOSE BLDC GLUCOMTR-MCNC: 281 MG/DL — HIGH (ref 70–99)
GLUCOSE BLDC GLUCOMTR-MCNC: 316 MG/DL — HIGH (ref 70–99)
GLUCOSE SERPL-MCNC: 329 MG/DL — HIGH (ref 70–99)
HCT VFR BLD CALC: 29.7 % — LOW (ref 39–50)
HGB BLD-MCNC: 9.3 G/DL — LOW (ref 13–17)
IMM GRANULOCYTES NFR BLD AUTO: 3 % — HIGH (ref 0–0.9)
LYMPHOCYTES # BLD AUTO: 0.75 K/UL — LOW (ref 1–3.3)
LYMPHOCYTES # BLD AUTO: 3.2 % — LOW (ref 13–44)
MCHC RBC-ENTMCNC: 28 PG — SIGNIFICANT CHANGE UP (ref 27–34)
MCHC RBC-ENTMCNC: 31.3 GM/DL — LOW (ref 32–36)
MCV RBC AUTO: 89.5 FL — SIGNIFICANT CHANGE UP (ref 80–100)
MONOCYTES # BLD AUTO: 1.22 K/UL — HIGH (ref 0–0.9)
MONOCYTES NFR BLD AUTO: 5.2 % — SIGNIFICANT CHANGE UP (ref 2–14)
NEUTROPHILS # BLD AUTO: 20.61 K/UL — HIGH (ref 1.8–7.4)
NEUTROPHILS NFR BLD AUTO: 88.4 % — HIGH (ref 43–77)
NRBC # BLD: 0 /100 WBCS — SIGNIFICANT CHANGE UP (ref 0–0)
PLATELET # BLD AUTO: 396 K/UL — SIGNIFICANT CHANGE UP (ref 150–400)
POTASSIUM SERPL-MCNC: 4.3 MMOL/L — SIGNIFICANT CHANGE UP (ref 3.5–5.3)
POTASSIUM SERPL-SCNC: 4.3 MMOL/L — SIGNIFICANT CHANGE UP (ref 3.5–5.3)
RBC # BLD: 3.32 M/UL — LOW (ref 4.2–5.8)
RBC # FLD: 17 % — HIGH (ref 10.3–14.5)
SODIUM SERPL-SCNC: 136 MMOL/L — SIGNIFICANT CHANGE UP (ref 135–145)
WBC # BLD: 23.34 K/UL — HIGH (ref 3.8–10.5)
WBC # FLD AUTO: 23.34 K/UL — HIGH (ref 3.8–10.5)

## 2024-05-06 PROCEDURE — 99232 SBSQ HOSP IP/OBS MODERATE 35: CPT | Mod: GC

## 2024-05-06 RX ORDER — INSULIN GLARGINE 100 [IU]/ML
24 INJECTION, SOLUTION SUBCUTANEOUS AT BEDTIME
Refills: 0 | Status: DISCONTINUED | OUTPATIENT
Start: 2024-05-06 | End: 2024-05-07

## 2024-05-06 RX ORDER — INSULIN LISPRO 100/ML
10 VIAL (ML) SUBCUTANEOUS ONCE
Refills: 0 | Status: COMPLETED | OUTPATIENT
Start: 2024-05-06 | End: 2024-05-06

## 2024-05-06 RX ORDER — POLYETHYLENE GLYCOL 3350 17 G/17G
17 POWDER, FOR SOLUTION ORAL
Refills: 0 | Status: DISCONTINUED | OUTPATIENT
Start: 2024-05-06 | End: 2024-05-14

## 2024-05-06 RX ORDER — INSULIN LISPRO 100/ML
10 VIAL (ML) SUBCUTANEOUS
Refills: 0 | Status: DISCONTINUED | OUTPATIENT
Start: 2024-05-06 | End: 2024-05-07

## 2024-05-06 RX ADMIN — APIXABAN 2.5 MILLIGRAM(S): 2.5 TABLET, FILM COATED ORAL at 06:01

## 2024-05-06 RX ADMIN — Medication 10 UNIT(S): at 12:17

## 2024-05-06 RX ADMIN — Medication 3: at 08:24

## 2024-05-06 RX ADMIN — Medication 20 MILLIGRAM(S): at 17:04

## 2024-05-06 RX ADMIN — Medication 20 MILLIGRAM(S): at 06:02

## 2024-05-06 RX ADMIN — Medication 3: at 12:17

## 2024-05-06 RX ADMIN — ISOSORBIDE DINITRATE 30 MILLIGRAM(S): 5 TABLET ORAL at 17:03

## 2024-05-06 RX ADMIN — Medication 50 MILLIGRAM(S): at 17:04

## 2024-05-06 RX ADMIN — Medication 50 MILLIGRAM(S): at 06:01

## 2024-05-06 RX ADMIN — Medication 40 MILLIGRAM(S): at 06:02

## 2024-05-06 RX ADMIN — ISOSORBIDE DINITRATE 30 MILLIGRAM(S): 5 TABLET ORAL at 06:01

## 2024-05-06 RX ADMIN — ISOSORBIDE DINITRATE 30 MILLIGRAM(S): 5 TABLET ORAL at 12:17

## 2024-05-06 RX ADMIN — APIXABAN 2.5 MILLIGRAM(S): 2.5 TABLET, FILM COATED ORAL at 17:04

## 2024-05-06 RX ADMIN — Medication 2: at 22:20

## 2024-05-06 RX ADMIN — Medication 3: at 17:02

## 2024-05-06 RX ADMIN — SODIUM CHLORIDE 40 MILLILITER(S): 9 INJECTION INTRAMUSCULAR; INTRAVENOUS; SUBCUTANEOUS at 02:19

## 2024-05-06 RX ADMIN — SENNA PLUS 2 TABLET(S): 8.6 TABLET ORAL at 06:33

## 2024-05-06 RX ADMIN — CHLORHEXIDINE GLUCONATE 1 APPLICATION(S): 213 SOLUTION TOPICAL at 12:18

## 2024-05-06 RX ADMIN — INSULIN GLARGINE 24 UNIT(S): 100 INJECTION, SOLUTION SUBCUTANEOUS at 22:20

## 2024-05-06 RX ADMIN — Medication 10 UNIT(S): at 17:03

## 2024-05-06 RX ADMIN — Medication 10 UNIT(S): at 08:24

## 2024-05-06 RX ADMIN — CLOPIDOGREL BISULFATE 75 MILLIGRAM(S): 75 TABLET, FILM COATED ORAL at 12:18

## 2024-05-06 NOTE — PROGRESS NOTE ADULT - ASSESSMENT
#ASHD s/p multiple pci.   #A-fib  #HTN  #HL  #T2DM  #CKD   #Rhabdomyolysis, ck lower 2981 on 5/5/24.   Await muscle biopsy result.   Continue hydration.   Eventual discharge to rehab.  The patient is stable from a cardiovascular perspective.

## 2024-05-06 NOTE — PROGRESS NOTE ADULT - ASSESSMENT
70 y/o male with hx of CAD s/p multiple  stents (most recent to p[facility] (70%), pLAD, mLAD on 1/8/24 with Dr. Dominguez), HFrEF , A-fib, HTN, HLD, T2DM,  CKD3, former smoker  Assessment  DMT2: 69y Male with DM T2 with hyperglycemia, A1C 7.6% , was on oral meds and insulin at home  (Farxiga and Semglee basal insulin), now on basal bolus insulins, sugars are within acceptable range.  was started on steroids, having steroid induced hyperglycemias  CAD: on medications, stable, monitored.   HTN: on antihypertensive medications, monitored, asymptomatic.  CKD: Monitor labs/BMP, renal follow up. rhabdo, muscle bx jane Rossi MD  Cell: [phone]  Office: [phone]

## 2024-05-06 NOTE — DISCHARGE NOTE PROVIDER - NSDCCPCAREPLAN_GEN_ALL_CORE_FT
PRINCIPAL DISCHARGE DIAGNOSIS  Diagnosis: Statin-induced rhabdomyolysis  Assessment and Plan of Treatment: you were treated with IVF   STOP Statin for now      SECONDARY DISCHARGE DIAGNOSES  Diagnosis: Statin-induced myositis  Assessment and Plan of Treatment: you were evalauted by Rheumatology  you had muscle biopsy done showing ....................  you were treated with steroids    Diagnosis: Chronic HFrEF (heart failure with reduced ejection fraction)  Assessment and Plan of Treatment: Weigh yourself daily.  If you gain 3lbs in 3 days, or 5lbs in a week call your Health Care Provider.  Do not eat or drink foods containing more than 2000mg of salt (sodium) in your diet every day.  Call your Health Care Provider if you have any swelling or increased swelling in your feet, ankles, and/or stomach.  Take all of your medication as directed.  If you become dizzy call your Health Care Provider.      Diagnosis: Drug induced liver disease  Assessment and Plan of Treatment: Hold Statin for now  Follow up with PCP for moitoring  Avoid alcohol    Diagnosis: Chronic atrial fibrillation  Assessment and Plan of Treatment: cont current medications      Diagnosis: Insufficiency fracture of femur, sequela  Assessment and Plan of Treatment: you were noted to have subacute, non-displaced L lateral condyle insufficiency fx.  weight bearing as tolerated  you refused knee immobilizer  Follow up with ortho in 1 week      Diagnosis: Uncontrolled diabetes mellitus with hyperglycemia  Assessment and Plan of Treatment: you had high blood sugars from steroids  your sugars were controlled with increasing doses of insulin  you were managed by Endocrinology     PRINCIPAL DISCHARGE DIAGNOSIS  Diagnosis: Statin-induced rhabdomyolysis  Assessment and Plan of Treatment: you were treated with IVF and noted to have improvement of CK levels  DO NOT CONTINUE STATIN ON DISCHARGE as your myositis is likely caused from the statin  Continue Medrol 32mg for 7 days (5/7 5/13)  followed by 24 mg daily until follow up with rheumatology      SECONDARY DISCHARGE DIAGNOSES  Diagnosis: Chronic HFrEF (heart failure with reduced ejection fraction)  Assessment and Plan of Treatment: Weigh yourself daily.  If you gain 3lbs in 3 days, or 5lbs in a week call your Health Care Provider.  Do not eat or drink foods containing more than 2000mg of salt (sodium) in your diet every day.  Call your Health Care Provider if you have any swelling or increased swelling in your feet, ankles, and/or stomach.  Take all of your medication as directed.  If you become dizzy call your Health Care Provider.      Diagnosis: Chronic atrial fibrillation  Assessment and Plan of Treatment: Continue Eliquis and metoprolol for rate control  Atrial fibrillation is a common heart rhythm problem which increases the risk of stroke and heat attack.  It helps if you control your blood pressure, avoid alcohol, cut down on caffeine, get treatment for your thyroid if it is overactive, and perform moderate exercise in consultation with your Primary Care Provider.  Call your doctor if you experience chest tightness/pain, lightheadedness, loss of consciousness, shortness of breath (especially with exercise), feel your heart racing or beating unusually, frequent or abnormal bleeding.  It is important to take all your heart medications as prescribed.      Diagnosis: Drug induced liver disease  Assessment and Plan of Treatment: Hold Statin   Follow up with PCP for moitoring  Avoid alcohol    Diagnosis: Insufficiency fracture of femur, sequela  Assessment and Plan of Treatment: you were noted to have subacute, non-displaced L lateral condyle insufficiency fx.  weight bearing as tolerated  you refused knee immobilizer  Follow up with ortho in 1 week      Diagnosis: Statin-induced myositis  Assessment and Plan of Treatment: you were evalauted by Rheumatology  you had muscle biopsy done  you were treated with steroids    Diagnosis: Uncontrolled diabetes mellitus with hyperglycemia  Assessment and Plan of Treatment: you had high blood sugars from steroids  your sugars were controlled with increasing doses of insulin  you were managed by Endocrinology

## 2024-05-06 NOTE — PROGRESS NOTE ADULT - SUBJECTIVE AND OBJECTIVE BOX
SUBJECTIVE: Patient reports bilateral leg weakness.  Awaiting to go to rehab to increase strentgth   	  MEDICATIONS  (STANDING):  apixaban 2.5 milliGRAM(s) Oral every 12 hours  chlorhexidine 2% Cloths 1 Application(s) Topical daily  clopidogrel Tablet 75 milliGRAM(s) Oral daily  dextrose 10% Bolus 125 milliLiter(s) IV Bolus once  dextrose 5%. 1000 milliLiter(s) (100 mL/Hr) IV Continuous <Continuous>  dextrose 5%. 1000 milliLiter(s) (50 mL/Hr) IV Continuous <Continuous>  dextrose 50% Injectable 25 Gram(s) IV Push once  dextrose 50% Injectable 12.5 Gram(s) IV Push once  furosemide   Injectable 40 milliGRAM(s) IV Push daily  glucagon  Injectable 1 milliGRAM(s) IntraMuscular once  influenza  Vaccine (HIGH DOSE) 0.7 milliLiter(s) IntraMuscular once  insulin glargine Injectable (LANTUS) 24 Unit(s) SubCutaneous at bedtime  insulin lispro (ADMELOG) corrective regimen sliding scale   SubCutaneous at bedtime  insulin lispro (ADMELOG) corrective regimen sliding scale   SubCutaneous three times a day before meals  insulin lispro Injectable (ADMELOG) 10 Unit(s) SubCutaneous three times a day before meals  isosorbide   dinitrate Tablet (ISORDIL) 30 milliGRAM(s) Oral three times a day  methylPREDNISolone sodium succinate Injectable 20 milliGRAM(s) IV Push two times a day  metoprolol tartrate 50 milliGRAM(s) Oral two times a day  polyethylene glycol 3350 17 Gram(s) Oral two times a day  senna 2 Tablet(s) Oral at bedtime  silver sulfADIAZINE 1% Cream 1 Application(s) Topical daily    MEDICATIONS  (PRN):  acetaminophen     Tablet .. 650 milliGRAM(s) Oral every 6 hours PRN Temp greater or equal to 38C (100.4F), Mild Pain (1 - 3)  bisacodyl 5 milliGRAM(s) Oral every 12 hours PRN Constipation  dextrose Oral Gel 15 Gram(s) Oral once PRN Blood Glucose LESS THAN 70 milliGRAM(s)/deciliter        REVIEW OF SYSTEMS:    CONSTITUTIONAL: No fever, weight loss, or fatigue  EYES: No eye pain, visual disturbances, or discharge  NECK: No pain or stiffness  RESPIRATORY: No cough, wheezing, chills or hemoptysis; No Shortness of Breath  CARDIOVASCULAR: No chest pain, palpitations, dizziness, or leg swelling  GASTROINTESTINAL: No abdominal or epigastric pain. No nausea, vomiting, or hematemesis; No diarrhea or constipation. No melena or hematochezia.  GENITOURINARY: No dysuria, frequency, hematuria, or incontinence  NEUROLOGICAL: No headaches, memory loss, loss of strength, numbness, or tremors  SKIN: No itching, burning, rashes, or lesions   LYMPH Nodes: No enlarged glands  MUSCULOSKELETAL: +bilateral leg pain and stiffness  All other review of systems are negative.  	      PHYSICAL EXAM:  T(F): 98 (05-06-24 @ 11:07), Max: 98 (05-06-24 @ 11:07)  HR: 82 (05-06-24 @ 11:07) (65 - 94)  BP: 122/64 (05-06-24 @ 11:07) (122/64 - 183/98)  RR: 18 (05-06-24 @ 11:07) (18 - 18)  SpO2: 98% (05-06-24 @ 11:07) (94% - 98%)  Wt(kg): --  ,   I&O's Summary    05 May 2024 07:01  -  06 May 2024 07:00  --------------------------------------------------------  IN: 1980 mL / OUT: 2775 mL / NET: -795 mL    06 May 2024 07:01  -  06 May 2024 12:14  --------------------------------------------------------  IN: 0 mL / OUT: 1550 mL / NET: -1550 mL              PHYSICAL EXAM    Appearance: Normal	  HEENT:   Normal oral mucosa, PERRL, EOMI	  NECK: Soft and supple, No LAD, No JVD  Cardiovascular: Regular Rate and Rhythm, Normal S1 S2, No murmurs, No clicks, gallops or rubs  Respiratory: Lungs clear to auscultation	  Extremities: No clubbing, cyanosis or edema    LABS:	 	               8.9    20.70 )-----------( 369      ( 05 May 2024 05:34 )             27.5     05-05    136  |  103  |  61<H>  ----------------------------<  293<H>  4.5   |  22  |  1.43<H>    Ca    8.2<L>      05 May 2024 05:34  Phos  3.0     05-03  Mg     1.8     05-04    TPro  4.6<L>  /  Alb  2.5<L>  /  TBili  0.6  /  DBili  x   /  AST  283<H>  /  ALT  523<H>  /  AlkPhos  116  05-05  -----------------------------------------------------------------------------------------------------------------------------------                        9.3    23.34 )-----------( 396      ( 06 May 2024 05:27 )             29.7               05-06    136  |  102  |  63<H>  ----------------------------<  329<H>  4.3   |  22  |  1.34<H>    Ca    8.6      06 May 2024 05:27    TPro  4.6<L>  /  Alb  2.5<L>  /  TBili  0.6  /  DBili  x   /  AST  283<H>  /  ALT  523<H>  /  AlkPhos  116  05-05           CARDIAC MARKERS ( 05 May 2024 05:34 )  x     / x     / 2981 U/L / x     / x                  Urinalysis Basic - ( 06 May 2024 05:27 )    Color: x / Appearance: x / SG: x / pH: x  Gluc: 329 mg/dL / Ketone: x  / Bili: x / Urobili: x   Blood: x / Protein: x / Nitrite: x   Leuk Esterase: x / RBC: x / WBC x   Sq Epi: x / Non Sq Epi: x / Bacteria: x      CPK pending for today.

## 2024-05-06 NOTE — PROGRESS NOTE ADULT - SUBJECTIVE AND OBJECTIVE BOX
Chief complaint    Patient is a 69y old  Male who presents with a chief complaint of Rhabdo (06 May 2024 14:32)   Review of systems  Patient appears comfortable.    Labs and Fingersticks  CAPILLARY BLOOD GLUCOSE      POCT Blood Glucose.: 281 mg/dL (06 May 2024 11:55)  POCT Blood Glucose.: 257 mg/dL (06 May 2024 08:01)  POCT Blood Glucose.: 246 mg/dL (05 May 2024 21:38)  POCT Blood Glucose.: 258 mg/dL (05 May 2024 16:31)      Anion Gap: 12 (05-06 @ 05:27)  Anion Gap: 11 (05-05 @ 05:34)      Calcium: 8.6 (05-06 @ 05:27)  Calcium: 8.2 *L* (05-05 @ 05:34)  Albumin: 2.5 *L* (05-05 @ 05:34)    Alanine Aminotransferase (ALT/SGPT): 523 *H* (05-05 @ 05:34)  Alkaline Phosphatase: 116 (05-05 @ 05:34)  Aspartate Aminotransferase (AST/SGOT): 283 *H* (05-05 @ 05:34)        05-06    136  |  102  |  63<H>  ----------------------------<  329<H>  4.3   |  22  |  1.34<H>    Ca    8.6      06 May 2024 05:27    TPro  4.6<L>  /  Alb  2.5<L>  /  TBili  0.6  /  DBili  x   /  AST  283<H>  /  ALT  523<H>  /  AlkPhos  116  05-05                        9.3    23.34 )-----------( 396      ( 06 May 2024 05:27 )             29.7     Medications  MEDICATIONS  (STANDING):  apixaban 2.5 milliGRAM(s) Oral every 12 hours  chlorhexidine 2% Cloths 1 Application(s) Topical daily  clopidogrel Tablet 75 milliGRAM(s) Oral daily  dextrose 10% Bolus 125 milliLiter(s) IV Bolus once  dextrose 5%. 1000 milliLiter(s) (100 mL/Hr) IV Continuous <Continuous>  dextrose 5%. 1000 milliLiter(s) (50 mL/Hr) IV Continuous <Continuous>  dextrose 50% Injectable 25 Gram(s) IV Push once  dextrose 50% Injectable 12.5 Gram(s) IV Push once  furosemide   Injectable 40 milliGRAM(s) IV Push daily  glucagon  Injectable 1 milliGRAM(s) IntraMuscular once  influenza  Vaccine (HIGH DOSE) 0.7 milliLiter(s) IntraMuscular once  insulin glargine Injectable (LANTUS) 24 Unit(s) SubCutaneous at bedtime  insulin lispro (ADMELOG) corrective regimen sliding scale   SubCutaneous at bedtime  insulin lispro (ADMELOG) corrective regimen sliding scale   SubCutaneous three times a day before meals  insulin lispro Injectable (ADMELOG) 10 Unit(s) SubCutaneous three times a day before meals  isosorbide   dinitrate Tablet (ISORDIL) 30 milliGRAM(s) Oral three times a day  methylPREDNISolone sodium succinate Injectable 20 milliGRAM(s) IV Push two times a day  metoprolol tartrate 50 milliGRAM(s) Oral two times a day  polyethylene glycol 3350 17 Gram(s) Oral two times a day  saline laxative (FLEET) Rectal Enema 1 Enema Rectal once  senna 2 Tablet(s) Oral at bedtime  silver sulfADIAZINE 1% Cream 1 Application(s) Topical daily      Physical Exam  General: Patient appears comfortable.  Vital Signs Last 12 Hrs  T(F): 98 (05-06-24 @ 11:07), Max: 98 (05-06-24 @ 11:07)  HR: 82 (05-06-24 @ 11:07) (65 - 82)  BP: 122/64 (05-06-24 @ 11:07) (122/64 - 133/73)  BP(mean): --  RR: 18 (05-06-24 @ 11:07) (18 - 18)  SpO2: 98% (05-06-24 @ 11:07) (97% - 98%)  Neck: No palpable thyroid nodules.  CVS: S1S2, No murmurs  Respiratory: No wheezing, no crepitations  GI: Abdomen soft, non tender.    Diagnostics        Radiology:

## 2024-05-06 NOTE — CONSULT NOTE ADULT - CONSULT REQUESTED BY NAME
Medicine
Dr. CRISTOFER Ridley
[place]
primary team
wound team
Dr. Raymundo
Dr. Ridley
Dr. Ridley
[place]
Dr Ridley

## 2024-05-06 NOTE — PROGRESS NOTE ADULT - ASSESSMENT
70 y/o man      h/o severe CAD S/P multiple PCI's including multiple LM and LAD stents.       +A-fib, HTN, HLD, T2DM, CKD3, former smoker     presents with  weaknss/   leg  pains.  no leg  weakness      + troponin /  elevated  cpk      CAD,  has   10  stents/  HTN/  HLD        cath,   4/18,  2.0 mm caliber TITO to distal LAD,Dr ILDA Bernal        on  dapt/  ef   47     troponin,  likely   demand  ischemia.  not  mi  per  dr arellano,,  not a  candidate   for  further intervention,   small  caliber  vessels   c/c  Afib,  on eliquis         elevated  CPK,    suspect  statin induced.  rhabdomyolysis/  form drug vs  polymyositis,    need  to  r/o  polymyositis, vs  drug induced   myositis,    mri  pelvis,  myositis/  fx  femoral condyle.  ortho  eval  noted,  rec immobilizer  pt refusing    ct a/p, neck, no  malignancy   await   bx   by   surg DR. palomino/  on iv  steroid  per   rheum   cont off statin   fu BX   follow  path  monitor  MACARENA   cont steroids .. ? change to po ...fu with rheum   fu bx       afib restarted eliquis     renal failure : monitor       elevated lfts,   abd  u/s,  normal liver       PAD/  will  f/p  a s an out pt with vascular. when stable   on asa/  eliquis, plavix, / lasix, imdur.  valsartan.  toprol      hold   Farxiga   and  valsartan for  now,  iv fluids,  per  renal   c/c  anemia            CAD : stent/on  4/17/24.   only   option , to   temporarily hold  eliquis  and   continue   plavix,   and   card  dr bravo and  dr brijesh bernal in  agreement    wbc  noted, from steroid,  cpk/  lfts decreasing     edema : dc ivf     rehab   discussed at length with acp and pt   dc planning                 -

## 2024-05-06 NOTE — CONSULT NOTE ADULT - CONSULT REASON
Leukocytosis
R/o PAD
left foot ulcer
CKD
CKD 3-Rhabdomyolysis
eval for inflammatory myositis
DM2
Muscle Biopsy
Subacute L lateral condyle fx
CAD

## 2024-05-06 NOTE — DISCHARGE NOTE PROVIDER - CARE PROVIDERS DIRECT ADDRESSES
[email or web address] - [employer] [facility] FrotasNor[facility]

## 2024-05-06 NOTE — DISCHARGE NOTE PROVIDER - CARE PROVIDER_API CALL
Silverio Pan  Orthopaedic Surgery  [address]  [place] [address]  Phone: [phone]  Fax: [phone]  Follow Up Time: 2 weeks    Piedad George  Rheumatology  [address]  [place] [address]  Phone: [phone]  Fax: [phone]  Follow Up Time: 1 week    Noa Carl  Family Medicine  [address]  [place] [address]  Phone: [phone]  Fax: [phone]  Follow Up Time: 1 week

## 2024-05-06 NOTE — DISCHARGE NOTE PROVIDER - PROVIDER TOKENS
PROVIDER:[TOKEN:[[id number]],FOLLOWUP:[2 weeks]],PROVIDER:[TOKEN:[[id number]],FOLLOWUP:[1 week]],PROVIDER:[TOKEN:[[id number]],FOLLOWUP:[1 week]]

## 2024-05-06 NOTE — DISCHARGE NOTE PROVIDER - HOSPITAL COURSE
HPI:  Pt is a 70yo M w/ PMH of HTN, HLD, DM2, CAD s/p 10 stents (last 1 week ago), Afib on Eliquis, HFrEF, CKD III pw LE pain/cramping.    Pt recently admitted 04/16-04/19 for NSTEMI s/p C w/ stent placement. He now reports b/l LE pain, stiffness and cramping w/ difficulty ambulating or even crossing legs for the past 7-8 days since discharge from the hospital. He also now reports dark urine.     He also reports some mild pressure like CP since discharge for which he followed up w/ his cardiologist outpt and noted to be likely i/s/o recent cath and to c/w Isosorbide.     Today he went to PMD and i/s/o weakness in LE and tachycardia, he was advised to present to the ED.    In the ED VSS w/ labs notable for leukocytosis to 15 and Cr 1.92 (~baseline) w/ elevated liver enzymes, trop 505 -> 506, [place] [address]. He was administered 2L IVF.      (25 Apr 2024 23:00)    Hospital Course:  Admitted with acute rhabdomyolysis. Initiated IVF. Also with significant proximal muscle weakness (thighs>shoulders) w/ CK 28K unresponsive to fluids (5-7L)  #low grade CK elevation 971 (1/29/24), 1318 (2/9/24) reported in the past. on home lipitor 80mg. MRI thighs showing nonspecific myositis involving b/l hip girdle/thighs. Rheum consulted. 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 c/w solumedrol 20mg q12 to treat suspected inflammatory myositis . continue with hydration as tolerated. LE edema being managed with Lasix by card/renal   -s/p R thigh muscle biopsy 5/2/24 . per inflammatory myositis malignancy screening guidelines: Pt is intermediate risk for malignancy (reports normal C-scope 2024)  CT neck, chest, abdomen, pelvis WNL. -PSA WNL. Noted  QTB indeterminant; likely anergic. CT chest unrevealing. (Hep B, Hep C negative)     Pt with  CKD3b - due to diabetes/hypertension - at/near baseline. Renal following . initial metabolic acidosis has resolved.   Course c/b with hyperglycemia, A1C 7.6% , was on oral meds and insulin at home  (Farxiga and Semglee basal insulin), now on basal bolus insulins. Endocrine consulted. Doses adjusted. , sugars are improving. Plan to DC on home Semglee , Stop home Farxiga altogther and any SGLT2i due to recent BARRY/rhabdo with OP follow up.                      Incidental finding of Small subacute nondisplaced subchondral insufficiency fracture of left lateral femoral condyle, ortho c/s, WBAT LLE with knee immobilizer (pt refused)  CPK downtrended to 2981. Stopped IVF. cont Lasix   *****INcomplete*****      Important Medication Changes and Reason:    Active or Pending Issues Requiring Follow-up:  > Rheumatology follow up for myositis  > Renal follow up for CKD  > Endocrine follow up for DM  > Cardiology follow up  for CAD and Afib  Advanced Directives:   [ ] Full code  [ ] DNR  [ ] Hospice    Discharge Diagnoses:  Acute rhabdomyolysis  inflammatory myositis  HTN  HLD   DM2   CAD s/p 10 stents (last 1 week ago)   Afib on Eliquis   HFrEF   CKD III        HPI:  Pt is a 68yo M w/ PMH of HTN, HLD, DM2, CAD s/p 10 stents (last 1 week ago), Afib on Eliquis, HFrEF, CKD III pw LE pain/cramping.    Pt recently admitted 04/16-04/19 for NSTEMI s/p C w/ stent placement. He now reports b/l LE pain, stiffness and cramping w/ difficulty ambulating or even crossing legs for the past 7-8 days since discharge from the hospital. He also now reports dark urine.     He also reports some mild pressure like CP since discharge for which he followed up w/ his cardiologist outpt and noted to be likely i/s/o recent cath and to c/w Isosorbide.     Today he went to PMD and i/s/o weakness in LE and tachycardia, he was advised to present to the ED.    In the ED VSS w/ labs notable for leukocytosis to 15 and Cr 1.92 (~baseline) w/ elevated liver enzymes, trop 505 -> 506, [place] [address]. He was administered 2L IVF.      (25 Apr 2024 23:00)    Hospital Course:  Admitted with acute rhabdomyolysis. Initiated IVF. Also with significant proximal muscle weakness (thighs>shoulders) w/ CK 28K unresponsive to fluids (5-7L)  #low grade CK elevation 971 (1/29/24), 1318 (2/9/24) reported in the past. on home lipitor 80mg. MRI thighs showing nonspecific myositis involving b/l hip girdle/thighs. Rheum consulted. 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 Treated with solumedrol 20mg q12 and transitioned to Medrol 32mg with plan to switch to 24 mg until follow up with rheumatology. continue with hydration as tolerated. LE edema being managed with Lasix by card/renal   -s/p R thigh muscle biopsy 5/2/24 . per inflammatory myositis malignancy screening guidelines: Pt is intermediate risk for malignancy (reports normal C-scope 2024)  CT neck, chest, abdomen, pelvis WNL. -PSA WNL. Noted  QTB indeterminant; likely anergic. CT chest unrevealing. (Hep B, Hep C negative)     Pt with  CKD3b - due to diabetes/hypertension - at/near baseline. Renal following . initial metabolic acidosis has resolved.   Course c/b with hyperglycemia, A1C 7.6% , was on oral meds and insulin at home  (Farxiga and Semglee basal insulin), now on basal bolus insulins. Endocrine consulted. Doses adjusted. , sugars are improving. Plan to DC on home Semglee , Stop home Farxiga altogther and any SGLT2i due to recent BARRY/rhabdo with OP follow up.  Of note, Incidental finding of Small subacute nondisplaced subchondral insufficiency fracture of left lateral femoral condyle, ortho c/s, WBAT LLE with knee immobilizer (pt refused)  CPK downtrended to 2981. Stopped IVF. cont Lasix       Important Medication Changes and Reason:  Medrol 32 mg for 7 days followed by 24 mg daily until follow up with rheumatology for myositis    Active or Pending Issues Requiring Follow-up:  > Rheumatology follow up for myositis  > Renal follow up for CKD  > Endocrine follow up for DM  > Cardiology follow up  for CAD and Afib  Advanced Directives:   [ ] Full code  [ ] DNR  [ ] Hospice    Discharge Diagnoses:  Acute rhabdomyolysis  inflammatory myositis  HTN  HLD   DM2   CAD s/p 10 stents (last 1 week ago)   Afib on Eliquis   HFrEF   CKD III        HPI:  Pt is a 68yo M w/ PMH of HTN, HLD, DM2, CAD s/p 10 stents (last 1 week ago), Afib on Eliquis, HFrEF, CKD III pw LE pain/cramping.    Pt recently admitted 04/16-04/19 for NSTEMI s/p C w/ stent placement. He now reports b/l LE pain, stiffness and cramping w/ difficulty ambulating or even crossing legs for the past 7-8 days since discharge from the hospital. He also now reports dark urine.     He also reports some mild pressure like CP since discharge for which he followed up w/ his cardiologist outpt and noted to be likely i/s/o recent cath and to c/w Isosorbide.     Today he went to PMD and i/s/o weakness in LE and tachycardia, he was advised to present to the ED.    In the ED VSS w/ labs notable for leukocytosis to 15 and Cr 1.92 (~baseline) w/ elevated liver enzymes, trop 505 -> 506[place] [address]. He was administered 2L IVF.      (25 Apr 2024 23:00)    Hospital Course:  Admitted with acute rhabdomyolysis. Initiated IVF. Also with significant proximal muscle weakness (thighs>shoulders) w/ CK 28K unresponsive to fluids (5-7L)  #low grade CK elevation 971 (1/29/24), 1318 (2/9/24) reported in the past. on home lipitor 80mg. MRI thighs showing nonspecific myositis involving b/l hip girdle/thighs. Rheum consulted. 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 Treated with solumedrol 20mg q12 and transitioned to Medrol 32mg with plan to switch to 24 mg until follow up with rheumatology. continue with hydration as tolerated. LE edema being managed with Lasix by card/renal   -s/p R thigh muscle biopsy 5/2/24 . per inflammatory myositis malignancy screening guidelines: Pt is intermediate risk for malignancy (reports normal C-scope 2024)  CT neck, chest, abdomen, pelvis WNL. -PSA WNL. Noted  QTB indeterminant; likely anergic. CT chest unrevealing. (Hep B, Hep C negative)     Pt with  CKD3b - due to diabetes/hypertension - at/near baseline. Renal following . initial metabolic acidosis has resolved.   Course c/b with hyperglycemia, A1C 7.6% , was on oral meds and insulin at home  (Farxiga and Semglee basal insulin), now on basal bolus insulins. Endocrine consulted. Doses adjusted. , sugars are improving. Plan to DC on home Semglee , Stop home Farxiga altogether and any SGLT2i due to recent BARRY/rhabdo with OP follow up.  Of note, Incidental finding of Small subacute nondisplaced subchondral insufficiency fracture of left lateral femoral condyle, ortho c/s, WBAT LLE with knee immobilizer (pt refused)  CPK downtrended to 2981. Stopped IVF. cont Lasix   Discharge HELD 5/13/24 for Hgb 7.6 (from 8/8) - PRBC x 1 given with appropriate response. (repeat Hgb 9.3)  Ultrasound of Right Thigh (site of ecchymosis and pain and previous hematoma s/p Muscle Biopsy Site) was done - resolution of previous hematoma with no other acute findings.  Cleared for discharge 5/14/24   Rheumatology reports "biopsy results obtained" - they plan on discussing (calling if patient not in-house) results with patient today. They report res    Important Medication Changes and Reason:  Medrol 32 mg for 7 days followed by 24 mg daily until follow up with rheumatology for myositis    Active or Pending Issues Requiring Follow-up:  > Rheumatology follow up for myositis  > Renal follow up for CKD  > Endocrine follow up for DM  > Cardiology follow up  for CAD and Afib  Advanced Directives:   [X] Full code  [ ] DNR  [ ] Hospice    Discharge Diagnoses:  Acute rhabdomyolysis  inflammatory myositis  HTN  HLD   DM2   CAD s/p 10 stents (last 1 week ago)   Afib on Eliquis   HFrEF   CKD III

## 2024-05-06 NOTE — CONSULT NOTE ADULT - CONSULT REQUESTED DATE/TIME
26-Apr-2024 09:55
06-May-2024 14:32
26-Apr-2024 16:08
28-Apr-2024 09:50
26-Apr-2024 15:02
29-Apr-2024 15:14
26-Apr-2024 09:35
30-Apr-2024 11:18
27-Apr-2024 15:59
29-Apr-2024 20:02

## 2024-05-06 NOTE — PROGRESS NOTE ADULT - SUBJECTIVE AND OBJECTIVE BOX
Date of service: 24 @ 12:32      Patient is a 69y old  Male who presents with a chief complaint of Rhabdo (06 May 2024 12:16)                                                               INTERVAL HPI/OVERNIGHT EVENTS:    REVIEW OF SYSTEMS:     CONSTITUTIONAL: No weakness, fevers or chills  RESPIRATORY: No cough, wheezing,  No shortness of breath  CARDIOVASCULAR: No chest pain or palpitations  GASTROINTESTINAL: No abdominal pain  . No nausea, vomiting, or hematemesis; No diarrhea or constipation. No melena or hematochezia.  GENITOURINARY: No dysuria, frequency or hematuria  NEUROLOGICAL: No numbness or weakness                                                                                                                                                                                                                                                                                Medications:  MEDICATIONS  (STANDING):  apixaban 2.5 milliGRAM(s) Oral every 12 hours  chlorhexidine 2% Cloths 1 Application(s) Topical daily  clopidogrel Tablet 75 milliGRAM(s) Oral daily  dextrose 10% Bolus 125 milliLiter(s) IV Bolus once  dextrose 5%. 1000 milliLiter(s) (50 mL/Hr) IV Continuous <Continuous>  dextrose 5%. 1000 milliLiter(s) (100 mL/Hr) IV Continuous <Continuous>  dextrose 50% Injectable 12.5 Gram(s) IV Push once  dextrose 50% Injectable 25 Gram(s) IV Push once  furosemide   Injectable 40 milliGRAM(s) IV Push daily  glucagon  Injectable 1 milliGRAM(s) IntraMuscular once  influenza  Vaccine (HIGH DOSE) 0.7 milliLiter(s) IntraMuscular once  insulin glargine Injectable (LANTUS) 24 Unit(s) SubCutaneous at bedtime  insulin lispro (ADMELOG) corrective regimen sliding scale   SubCutaneous three times a day before meals  insulin lispro (ADMELOG) corrective regimen sliding scale   SubCutaneous at bedtime  insulin lispro Injectable (ADMELOG) 10 Unit(s) SubCutaneous three times a day before meals  isosorbide   dinitrate Tablet (ISORDIL) 30 milliGRAM(s) Oral three times a day  methylPREDNISolone sodium succinate Injectable 20 milliGRAM(s) IV Push two times a day  metoprolol tartrate 50 milliGRAM(s) Oral two times a day  polyethylene glycol 3350 17 Gram(s) Oral two times a day  saline laxative (FLEET) Rectal Enema 1 Enema Rectal once  senna 2 Tablet(s) Oral at bedtime  silver sulfADIAZINE 1% Cream 1 Application(s) Topical daily    MEDICATIONS  (PRN):  acetaminophen     Tablet .. 650 milliGRAM(s) Oral every 6 hours PRN Temp greater or equal to 38C (100.4F), Mild Pain (1 - 3)  bisacodyl 5 milliGRAM(s) Oral every 12 hours PRN Constipation  dextrose Oral Gel 15 Gram(s) Oral once PRN Blood Glucose LESS THAN 70 milliGRAM(s)/deciliter       Allergies    atorvastatin (Muscle Pain (Severe))    Intolerances      Vital Signs Last 24 Hrs  T(C): 36.7 (06 May 2024 11:07), Max: 36.7 (06 May 2024 11:07)  T(F): 98 (06 May 2024 11:07), Max: 98 (06 May 2024 11:07)  HR: 82 (06 May 2024 11:07) (65 - 94)  BP: 122/64 (06 May 2024 11:07) (122/64 - 183/98)  BP(mean): --  RR: 18 (06 May 2024 11:07) (18 - 18)  SpO2: 98% (06 May 2024 11:07) (94% - 98%)    Parameters below as of 06 May 2024 11:07  Patient On (Oxygen Delivery Method): room air      CAPILLARY BLOOD GLUCOSE      POCT Blood Glucose.: 281 mg/dL (06 May 2024 11:55)  POCT Blood Glucose.: 257 mg/dL (06 May 2024 08:01)  POCT Blood Glucose.: 246 mg/dL (05 May 2024 21:38)  POCT Blood Glucose.: 258 mg/dL (05 May 2024 16:31)  POCT Blood Glucose.: 199 mg/dL (05 May 2024 13:37)       @ 07:  -   @ 07:00  --------------------------------------------------------  IN: 1980 mL / OUT: 2775 mL / NET: -795 mL     @ 07:01  -   @ 12:32  --------------------------------------------------------  IN: 0 mL / OUT: 1550 mL / NET: -1550 mL      Physical Exam:    Daily     Daily Weight in k (06 May 2024 08:08)  General:  Well appearing, NAD, not cachetic  HEENT:  Nonicteric, PERRLA  CV:  RRR, S1S2   Lungs:  CTA B/L, no wheezes, rales, rhonchi  Abdomen:  Soft, non-tender, no distended, positive BS  Extremities: edema   Neuro:  AAOx3, non-focal, grossly intact                                                                                                                                                                                                                                                                                                LABS:                               9.3    23.34 )-----------( 396      ( 06 May 2024 05:27 )             29.7                      05-06    136  |  102  |  63<H>  ----------------------------<  329<H>  4.3   |  22  |  1.34<H>    Ca    8.6      06 May 2024 05:27    TPro  4.6<L>  /  Alb  2.5<L>  /  TBili  0.6  /  DBili  x   /  AST  283<H>  /  ALT  523<H>  /  AlkPhos  116  05-05                      Time spent :

## 2024-05-06 NOTE — CHART NOTE - NSCHARTNOTEFT_GEN_A_CORE
Wound Care Team Note:    Request for wound care consult for foot/toe wound received and referred to Podiatry. Please refer to Podiatry for management. Will not follow.    Nicole Jackson NP-C, CWOCN via TEAMS

## 2024-05-06 NOTE — PROGRESS NOTE ADULT - ASSESSMENT
70 yo M PMH HLD, CAD (s/p 10 stents, last 4/2024), Afib on Eliquis, HFrEF, CKD3 2/2 DM/HTN p/w LE pain/cramping found to have CK 28K     #significant proximal muscle weakness (thighs>shoulders) w/ CK 28K initially unresponsive to fluids (5-7L)  #low grade CK elevation 971 (1/29/24), 1318 (2/9/24) reported in the past  #on home lipitor 80mg  #MRI thighs showing nonspecific myositis involving b/l hip girdle/thighs    #per inflammatory myositis malignancy screening guidelines: Pt is intermediate risk for malignancy (reports normal C-scope 2024)  -CT neck, chest, abdomen, pelvis WNL  -PSA WNL    # QTB indeterminant  likely anergic   CT chest unrevealing  (Hep B, Hep C negative)      Impression: Constellation of symptoms and findings above was suspicious for 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 The patient's rapid improvement in CK since starting steroids however was unusual for inflammatory myositis, and suspect that it may have been from the fluids. In addition, the HMGCR Antibody resulted negative, and it is a very sensitive test for inflammatory myositis. Given the information we have now, would have to consider that this may have all been 2/2 rhabdomyolysis 2/2 statin use alone, however will follow up muscle biopsy to rule out an inflammatory myositis w/ necrotizing myopathy.    Plan:  -will decrease steroids from solumedrol 20mg q12 to PO medrol 32mg qd for 7 days, then 24mg qd until rheumatology follow up as outpatient  -can follow up muscle biopsy, myomarker panel, SRP ab as outpatient   -if patient planned for rehab, would c/w agressive diuresis and still w/ significant pitting leg/foot edema which is limiting his strength (despite downtrending CK)   -can follow up with Rheumatologist Dr Tasha Pelletier as outpatient within 2 weeks of hospital discharge - please ensure that it is coordinated by the patients rehab and rheumatology office    Tasha Pelletier MD  Rheumatology, Internal Medicine  [phone]  [address] [facility], [address] [facility] [address], [place] [address]     Discussed with Attending Dr. Mary Mary, DO  Rheumatology Fellow  Available on TEAMS 68 yo M PMH HLD, CAD (s/p 10 stents, last 4/2024), Afib on Eliquis, HFrEF, CKD3 2/2 DM/HTN p/w LE pain/cramping found to have CK 28K     #significant proximal muscle weakness (thighs>shoulders) w/ CK 28K initially unresponsive to fluids (5-7L)  #low grade CK elevation 971 (1/29/24), 1318 (2/9/24) reported in the past  #on home lipitor 80mg  #MRI thighs showing nonspecific myositis involving b/l hip girdle/thighs    #per inflammatory myositis malignancy screening guidelines: Pt is intermediate risk for malignancy (reports normal C-scope 2024)  -CT neck, chest, abdomen, pelvis WNL  -PSA WNL    # QTB indeterminant  likely anergic   CT chest unrevealing  (Hep B, Hep C negative)      Impression: Constellation of symptoms and findings above was suspicious for 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 The patient's rapid improvement in CK since starting steroids however was unusual for inflammatory myositis, and suspect that it may have been from the fluids. In addition, the HMGCR Antibody resulted negative, and it is a very sensitive test for inflammatory myositis. Given the information we have now, would have to consider that this may have all been 2/2 rhabdomyolysis 2/2 statin use alone, however will follow up muscle biopsy to rule out an inflammatory myositis w/ necrotizing myopathy.    Plan:  -will decrease steroids from solumedrol 20mg q12 to PO medrol 32mg qd for 7 days, then 24mg qd until rheumatology follow up as outpatient  -continue to trend CK  -can follow up muscle biopsy, myomarker panel, SRP ab as outpatient   -if patient planned for rehab, would c/w aggressive diuresis and still w/ significant pitting leg/foot edema which is limiting his strength (despite downtrending CK)   -can follow up with Rheumatologist Dr Tasha Pelletier as outpatient within 2 weeks of hospital discharge - please ensure that it is coordinated by the patients rehab and rheumatology office    Tasha Pelletier MD  Rheumatology, Internal Medicine  [phone]  [address], [address], [place] [address]     Discussed with Attending Dr. Mary Mary, DO  Rheumatology Fellow  Available on TEAMS

## 2024-05-06 NOTE — CONSULT NOTE ADULT - ASSESSMENT
Patient visited at chairside    LOWER EXTREMITY PHYSICAL EXAM:    Vascular: DP/PT 0/4, B/L, CFT < 3seconds B/L, Temperature gradient wnl, B/L.   2+ edema both feet  Neuro: Epicritic sensation absent to the level of feet, B/L.  Skin: blistered skin with superficial ulcer noted dorsal left foot  patient presents with wet bandage. patient spilled water on feet and hasn't changed bandages  No signs of cellulitis no signs of abscess no erythema or infection both feet  no debridement needed no sinus tracts at superficial wound left foot   Stress importance of keeping feet clean and dry  patient told if bandages become wet that he needs to have them changes as soon as possible since moisture increases risk of infection  recommend betadine with gauze and bill left foot  reconsult podiatry as needed

## 2024-05-06 NOTE — DISCHARGE NOTE PROVIDER - NSDCMRMEDTOKEN_GEN_ALL_CORE_FT
apixaban 2.5 mg oral tablet: 1 tab(s) orally every 12 hours  aspirin 81 mg oral delayed release tablet: 1 tab(s) orally once a day  atorvastatin 80 mg oral tablet: 1 tab(s) orally once a day (at bedtime)  clopidogrel 75 mg oral tablet: 1 tab(s) orally once a day  ezetimibe 10 mg oral tablet: 1 tab(s) orally once a day  Farxiga 10 mg oral tablet: 1 tab(s) orally once a day  furosemide 40 mg oral tablet: 1 tab(s) orally once a day  isosorbide dinitrate 30 mg oral tablet: 1 tab(s) orally 3 times a day  metoprolol tartrate 25 mg oral tablet: 1 tab(s) orally 2 times a day MDD: 2 tab  multivitamin gummies: 1 gummy orally once a day  Semglee (Prefilled Pen) 100 units/mL subcutaneous solution: 28-30 units once a day  valsartan 40 mg oral tablet: 1 tab(s) orally once a day   acetaminophen 325 mg oral tablet: 2 tab(s) orally every 6 hours As needed Temp greater or equal to 38C (100.4F), Mild Pain (1 - 3)  apixaban 2.5 mg oral tablet: 1 tab(s) orally every 12 hours  aspirin 81 mg oral delayed release tablet: 1 tab(s) orally once a day  bisacodyl 5 mg oral delayed release tablet: 1 tab(s) orally every 12 hours As needed Constipation  clopidogrel 75 mg oral tablet: 1 tab(s) orally once a day  Farxiga 10 mg oral tablet: 1 tab(s) orally once a day  furosemide 40 mg oral tablet: 1 tab(s) orally once a day  isosorbide dinitrate 30 mg oral tablet: 1 tab(s) orally 3 times a day  methylPREDNISolone 32 mg oral tablet: 1 tab(s) orally once a day from 5/7/24 until 5/13/24 THEN take 24mg daily until outpatient rheumatology follow up  metoprolol tartrate 50 mg oral tablet: 1 tab(s) orally 2 times a day  multivitamin gummies: 1 gummy orally once a day  polyethylene glycol 3350 oral powder for reconstitution: 17 gram(s) orally 2 times a day  Semglee (Prefilled Pen) 100 units/mL subcutaneous solution: 28-30 units once a day  senna leaf extract oral tablet: 2 tab(s) orally once a day (at bedtime)  silver sulfADIAZINE 1% topical cream: 1 Apply topically to affected area once a day  valsartan 40 mg oral tablet: 1 tab(s) orally once a day   acetaminophen 325 mg oral tablet: 2 tab(s) orally every 6 hours As needed Temp greater or equal to 38C (100.4F), Mild Pain (1 - 3)  apixaban 2.5 mg oral tablet: 1 tab(s) orally every 12 hours  bisacodyl 5 mg oral delayed release tablet: 1 tab(s) orally every 12 hours As needed Constipation  clopidogrel 75 mg oral tablet: 1 tab(s) orally once a day  furosemide 40 mg oral tablet: 1 tab(s) orally 2 times a day  insulin glargine 100 units/mL subcutaneous solution: 12 unit(s) subcutaneous once a day (at bedtime)  insulin lispro 100 units/mL injectable solution: 1 dose(s) subcutaneous 3 times a day (before meals) sliding scale: 1 Unit(s) if Glucose 151 - 200  2 Unit(s) if Glucose 201 - 250  3 Unit(s) if Glucose 251 - 300  4 Unit(s) if Glucose 301 - 350  5 Unit(s) if Glucose 351 - 400  6 Unit(s) if Glucose Greater Than 400  insulin lispro 100 units/mL injectable solution: 1 dose(s) subcutaneous once a day (at bedtime) sliding scale:  0 Unit(s) if Glucose 0 - 250  1 Unit(s) if Glucose 251 - 300  2 Unit(s) if Glucose 301 - 350  3 Unit(s) if Glucose 351 - 400  4 Unit(s) if Glucose 400  insulin lispro 100 units/mL injectable solution: 7 unit(s) subcutaneous 3 times a day (before meals)  isosorbide dinitrate 30 mg oral tablet: 1 tab(s) orally 3 times a day  methylPREDNISolone 4 mg oral tablet: 1 dose(s) orally once a day Taper:  12mg on 5/14/24 and 5/15/24   8mg on 5/16/24 and 5/17/24  4mg on 5/18/24 and 5/19/24 then STOP  metoprolol tartrate 50 mg oral tablet: 1 tab(s) orally 2 times a day  multivitamin gummies: 1 gummy orally once a day  oxyCODONE 5 mg oral tablet: 1 tab(s) orally every 6 hours As needed Severe Pain (7 - 10)  polyethylene glycol 3350 oral powder for reconstitution: 17 gram(s) orally 2 times a day  senna leaf extract oral tablet: 2 tab(s) orally once a day (at bedtime)  silver sulfADIAZINE 1% topical cream: Apply topically to affected area once a day 1 Apply topically to affected area once a day (bilateral foot wounds)

## 2024-05-06 NOTE — PROGRESS NOTE ADULT - SUBJECTIVE AND OBJECTIVE BOX
Overnight events noted      VITAL:  T(C): , Max: 36.7 (05-06-24 @ 11:07)  T(F): , Max: 98 (05-06-24 @ 11:07)  HR: 82 (05-06-24 @ 11:07)  BP: 122/64 (05-06-24 @ 11:07)  BP(mean): --  RR: 18 (05-06-24 @ 11:07)  SpO2: 98% (05-06-24 @ 11:07)  Wt(kg): --      PHYSICAL EXAM:  Constitutional: alert, NAD  HEENT: NCAT, MMM  Neck: Supple, No JVD  Respiratory: CTA-b/l  Cardiovascular: RRR s1s2, no m/r/g  Gastrointestinal: BS+, soft, NT/ND  Extremities: (+)b/l LE edema/mild tenderness  Neurological: no focal deficits; strength grossly intact  Back: no CVAT b/l  Skin: No rashes, no nevi    LABS:                        9.3    23.34 )-----------( 396      ( 06 May 2024 05:27 )             29.7     Na(136)/K(4.3)/Cl(102)/HCO3(22)/BUN(63)/Cr(1.34)Glu(329)/Ca(8.6)/Mg(--)/PO4(--)    05-06 @ 05:27  Na(136)/K(4.5)/Cl(103)/HCO3(22)/BUN(61)/Cr(1.43)Glu(293)/Ca(8.2)/Mg(--)/PO4(--)    05-05 @ 05:34  Na(138)/K(4.0)/Cl(104)/HCO3(23)/BUN(53)/Cr(1.40)Glu(135)/Ca(7.4)/Mg(1.8)/PO4(--)    05-04 @ 06:43  Na(136)/K(4.8)/Cl(100)/HCO3(25)/BUN(64)/Cr(1.61)Glu(143)/Ca(8.5)/Mg(2.1)/PO4(3.0)    05-03 @ 22:35    CPK 2981 (5/5/24)      IMPRESSION: 69M w/ HTN, DM2, CAD, AFib, HFrEF, and CKD, s/p recent PCI, 4/25/24 returned with acute rhabdomyolysis    (1)Renal - CKD3b - due to diabetes/hypertension - at/near baseline  (2)Rhabdo - improving, off IVF  (3)Metabolic acidosis - resolved  (4)Rheum - 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 - Surgery and Rheum on board- s/p muscle biopsy 5/2    RECOMMEND:  (1)No IVF for now  (2)Can continue Lasix 40mg iv daily as ordered  (3)F/U muscle biopsy results      Markell Walton MD  [facility]  Office/on call physician: [phone]  Cell (1z-7r): [phone]       (+)persistent b/l LE weakness/pain/swelling  (+)constipation/asking for enema    VITAL:  T(C): , Max: 36.7 (05-06-24 @ 11:07)  T(F): , Max: 98 (05-06-24 @ 11:07)  HR: 82 (05-06-24 @ 11:07)  BP: 122/64 (05-06-24 @ 11:07)  BP(mean): --  RR: 18 (05-06-24 @ 11:07)  SpO2: 98% (05-06-24 @ 11:07)  Wt(kg): --      PHYSICAL EXAM:  Constitutional: alert, NAD  HEENT: NCAT, MMM  Neck: Supple, No JVD  Respiratory: CTA-b/l  Cardiovascular: RRR s1s2, no m/r/g  Gastrointestinal: BS+, soft, NT/ND  Extremities: (+)b/l LE edema/mild tenderness  Neurological: no focal deficits; strength grossly intact  Back: no CVAT b/l  Skin: No rashes, no nevi    LABS:                        9.3    23.34 )-----------( 396      ( 06 May 2024 05:27 )             29.7     Na(136)/K(4.3)/Cl(102)/HCO3(22)/BUN(63)/Cr(1.34)Glu(329)/Ca(8.6)/Mg(--)/PO4(--)    05-06 @ 05:27  Na(136)/K(4.5)/Cl(103)/HCO3(22)/BUN(61)/Cr(1.43)Glu(293)/Ca(8.2)/Mg(--)/PO4(--)    05-05 @ 05:34  Na(138)/K(4.0)/Cl(104)/HCO3(23)/BUN(53)/Cr(1.40)Glu(135)/Ca(7.4)/Mg(1.8)/PO4(--)    05-04 @ 06:43  Na(136)/K(4.8)/Cl(100)/HCO3(25)/BUN(64)/Cr(1.61)Glu(143)/Ca(8.5)/Mg(2.1)/PO4(3.0)    05-03 @ 22:35    CPK 2981 (5/5/24)      IMPRESSION: 69M w/ HTN, DM2, CAD, AFib, HFrEF, and CKD, s/p recent PCI, 4/25/24 returned with acute rhabdomyolysis    (1)Renal - CKD3b - due to diabetes/hypertension - at/near baseline  (2)Rhabdo - improving, off IVF  (3)Metabolic acidosis - resolved  (4)Rheum - 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  Office/on call physician: [phone]  Cell (7a-7p): [phone]

## 2024-05-06 NOTE — CONSULT NOTE ADULT - SUBJECTIVE AND OBJECTIVE BOX
Patient is a 69y old  Male who presents with a chief complaint of Rhabdo (06 May 2024 12:31)      HPI:  Pt is a 70yo M w/ PMH of HTN, HLD, DM2, CAD s/p 10 stents (last 1 week ago), Afib on Eliquis, HFrEF, CKD III pw LE pain/cramping.    Pt recently admitted 04/16-04/19 for NSTEMI s/p LHC w/ stent placement. He now reports b/l LE pain, stiffness and cramping w/ difficulty ambulating or even crossing legs for the past 7-8 days since discharge from the hospital. He also now reports dark urine.     He also reports some mild pressure like CP since discharge for which he followed up w/ his cardiologist outpt and noted to be likely i/s/o recent cath and to c/w Isosorbide.     Today he went to PMD and i/s/o weakness in LE and tachycardia, he was advised to present to the ED.    In the ED VSS w/ labs notable for leukocytosis to 15 and Cr 1.92 (~baseline) w/ elevated liver enzymes, trop 505 -> 506[place] [address]. He was administered 2L IVF.      (25 Apr 2024 23:00)      PAST MEDICAL & SURGICAL HISTORY:  Former smoker      CAD in native artery      Type 2 diabetes mellitus      Hyperlipidemia, unspecified hyperlipidemia type      Essential hypertension, hypertension with unspecified goal      Afib      Hematoma of leg      Stented coronary artery      CHF (congestive heart failure)      s/p Carotid Endarterectomy  left          MEDICATIONS  (STANDING):  apixaban 2.5 milliGRAM(s) Oral every 12 hours  chlorhexidine 2% Cloths 1 Application(s) Topical daily  clopidogrel Tablet 75 milliGRAM(s) Oral daily  dextrose 10% Bolus 125 milliLiter(s) IV Bolus once  dextrose 5%. 1000 milliLiter(s) (100 mL/Hr) IV Continuous <Continuous>  dextrose 5%. 1000 milliLiter(s) (50 mL/Hr) IV Continuous <Continuous>  dextrose 50% Injectable 25 Gram(s) IV Push once  dextrose 50% Injectable 12.5 Gram(s) IV Push once  furosemide   Injectable 40 milliGRAM(s) IV Push daily  glucagon  Injectable 1 milliGRAM(s) IntraMuscular once  influenza  Vaccine (HIGH DOSE) 0.7 milliLiter(s) IntraMuscular once  insulin glargine Injectable (LANTUS) 24 Unit(s) SubCutaneous at bedtime  insulin lispro (ADMELOG) corrective regimen sliding scale   SubCutaneous three times a day before meals  insulin lispro (ADMELOG) corrective regimen sliding scale   SubCutaneous at bedtime  insulin lispro Injectable (ADMELOG) 10 Unit(s) SubCutaneous three times a day before meals  isosorbide   dinitrate Tablet (ISORDIL) 30 milliGRAM(s) Oral three times a day  methylPREDNISolone sodium succinate Injectable 20 milliGRAM(s) IV Push two times a day  metoprolol tartrate 50 milliGRAM(s) Oral two times a day  polyethylene glycol 3350 17 Gram(s) Oral two times a day  saline laxative (FLEET) Rectal Enema 1 Enema Rectal once  senna 2 Tablet(s) Oral at bedtime  silver sulfADIAZINE 1% Cream 1 Application(s) Topical daily    MEDICATIONS  (PRN):  acetaminophen     Tablet .. 650 milliGRAM(s) Oral every 6 hours PRN Temp greater or equal to 38C (100.4F), Mild Pain (1 - 3)  bisacodyl 5 milliGRAM(s) Oral every 12 hours PRN Constipation  dextrose Oral Gel 15 Gram(s) Oral once PRN Blood Glucose LESS THAN 70 milliGRAM(s)/deciliter      Allergies    atorvastatin (Muscle Pain (Severe))    Intolerances        VITALS:    Vital Signs Last 24 Hrs  T(C): 36.7 (06 May 2024 11:07), Max: 36.7 (06 May 2024 11:07)  T(F): 98 (06 May 2024 11:07), Max: 98 (06 May 2024 11:07)  HR: 82 (06 May 2024 11:07) (65 - 94)  BP: 122/64 (06 May 2024 11:07) (122/64 - 183/98)  BP(mean): --  RR: 18 (06 May 2024 11:07) (18 - 18)  SpO2: 98% (06 May 2024 11:07) (94% - 98%)    Parameters below as of 06 May 2024 11:07  Patient On (Oxygen Delivery Method): room air        LABS:                          9.3    23.34 )-----------( 396      ( 06 May 2024 05:27 )             29.7       05-06    136  |  102  |  63<H>  ----------------------------<  329<H>  4.3   |  22  |  1.34<H>    Ca    8.6      06 May 2024 05:27    TPro  4.6<L>  /  Alb  2.5<L>  /  TBili  0.6  /  DBili  x   /  AST  283<H>  /  ALT  523<H>  /  AlkPhos  116  05-05      CAPILLARY BLOOD GLUCOSE      POCT Blood Glucose.: 281 mg/dL (06 May 2024 11:55)  POCT Blood Glucose.: 257 mg/dL (06 May 2024 08:01)  POCT Blood Glucose.: 246 mg/dL (05 May 2024 21:38)  POCT Blood Glucose.: 258 mg/dL (05 May 2024 16:31)          LOWER EXTREMITY PHYSICAL EXAM:    Vascular: DP/PT 0/4, B/L, CFT < 3seconds B/L, Temperature gradient wnl, B/L.   2+ edema both feet  Neuro: Epicritic sensation absent to the level of feet, B/L.  Skin: blistered skin with superficial ulcer noted dorsal left foot  patient presents with wet bandage. patient spilled water on feet and hasn't changed bandages  No signs of cellulitis no signs of abscess no erythema or infection both feet

## 2024-05-06 NOTE — PROGRESS NOTE ADULT - SUBJECTIVE AND OBJECTIVE BOX
KRISTY COYLE  [id number]    INTERVAL HPI/OVERNIGHT EVENTS: Pt says he is feeling well. Feels like his legs/thighs are still significantly swollen and weak. Denies fever, chills, chest pain, sob.     MEDICATIONS  (STANDING):  apixaban 2.5 milliGRAM(s) Oral every 12 hours  chlorhexidine 2% Cloths 1 Application(s) Topical daily  clopidogrel Tablet 75 milliGRAM(s) Oral daily  dextrose 10% Bolus 125 milliLiter(s) IV Bolus once  dextrose 5%. 1000 milliLiter(s) (100 mL/Hr) IV Continuous <Continuous>  dextrose 5%. 1000 milliLiter(s) (50 mL/Hr) IV Continuous <Continuous>  dextrose 50% Injectable 12.5 Gram(s) IV Push once  dextrose 50% Injectable 25 Gram(s) IV Push once  furosemide   Injectable 40 milliGRAM(s) IV Push daily  glucagon  Injectable 1 milliGRAM(s) IntraMuscular once  influenza  Vaccine (HIGH DOSE) 0.7 milliLiter(s) IntraMuscular once  insulin glargine Injectable (LANTUS) 24 Unit(s) SubCutaneous at bedtime  insulin lispro (ADMELOG) corrective regimen sliding scale   SubCutaneous three times a day before meals  insulin lispro (ADMELOG) corrective regimen sliding scale   SubCutaneous at bedtime  insulin lispro Injectable (ADMELOG) 10 Unit(s) SubCutaneous three times a day before meals  isosorbide   dinitrate Tablet (ISORDIL) 30 milliGRAM(s) Oral three times a day  methylPREDNISolone sodium succinate Injectable 20 milliGRAM(s) IV Push two times a day  metoprolol tartrate 50 milliGRAM(s) Oral two times a day  polyethylene glycol 3350 17 Gram(s) Oral two times a day  saline laxative (FLEET) Rectal Enema 1 Enema Rectal once  senna 2 Tablet(s) Oral at bedtime  silver sulfADIAZINE 1% Cream 1 Application(s) Topical daily    MEDICATIONS  (PRN):  acetaminophen     Tablet .. 650 milliGRAM(s) Oral every 6 hours PRN Temp greater or equal to 38C (100.4F), Mild Pain (1 - 3)  bisacodyl 5 milliGRAM(s) Oral every 12 hours PRN Constipation  dextrose Oral Gel 15 Gram(s) Oral once PRN Blood Glucose LESS THAN 70 milliGRAM(s)/deciliter      Allergies    atorvastatin (Muscle Pain (Severe))    Intolerances        Review of Systems: as above    Vital Signs Last 24 Hrs  T(C): 36.7 (06 May 2024 11:07), Max: 36.7 (06 May 2024 11:07)  T(F): 98 (06 May 2024 11:07), Max: 98 (06 May 2024 11:07)  HR: 82 (06 May 2024 11:07) (65 - 94)  BP: 122/64 (06 May 2024 11:07) (122/64 - 183/98)  BP(mean): --  RR: 18 (06 May 2024 11:07) (18 - 18)  SpO2: 98% (06 May 2024 11:07) (96% - 98%)    Parameters below as of 06 May 2024 11:07  Patient On (Oxygen Delivery Method): room air      Physical Exam:  General: Breathing comfortably on room air, no distress  HEENT: EOMI  CVS: +S1/S2  Resp: CTA b/l  GI: Soft, NT/ND +BS  MSK: no synovitis. Able to stand from sitting to standing position on own. 5/5 strength in b/l shoulders, 5/5 distals arms/hands, 2/5 R thigh, 2/5 L thigh, 5/5 distal legs/feet  Skin: no visible rashes. Pitting leg/ankle/foot edema b/l      LABS:                        9.3    23.34 )-----------( 396      ( 06 May 2024 05:27 )             29.7     05-06    136  |  102  |  63<H>  ----------------------------<  329<H>  4.3   |  22  |  1.34<H>    Ca    8.6      06 May 2024 05:27    TPro  4.6<L>  /  Alb  2.5<L>  /  TBili  0.6  /  DBili  x   /  AST  283<H>  /  ALT  523<H>  /  AlkPhos  116  05-05      Urinalysis Basic - ( 06 May 2024 05:27 )    Color: x / Appearance: x / SG: x / pH: x  Gluc: 329 mg/dL / Ketone: x  / Bili: x / Urobili: x   Blood: x / Protein: x / Nitrite: x   Leuk Esterase: x / RBC: x / WBC x   Sq Epi: x / Non Sq Epi: x / Bacteria: x    HMGCR Antibody, IgG (04.28.24 @ 17:43)   HMGCR Antibody, IgG result: <3: INTERPRETIVE INFORMATION: HMGCR Antibody, IgG   IgG antibodies to 3-hydroxy-3-methylglutaryl-coenzyme A reductase   (HMGCR) are mainly associated with necrotizing autoimmune myopathy   (NAM) in a subset of statin-treated patients. Although infrequent,   these antibodies may also be observed in statin-naive patients   with NAM. Strong clinical correlation is recommended in the   absence of muscle fiber necrosis, elevated serum creatine kinase,   perimysial pathology, and/or statin exposure.   This test was developed and its performance characteristics   determined by [facility]. It has not been cleared or   approved by the [place] Food and Drug Administration. This test was   performed in a CLIA certified laboratory and is intended for   clinical purposes.   Performed By: [facility]   [address] [place] [id number]   : Johnnie Ann MD, PhD   CLIA Number: [id number] Units

## 2024-05-07 LAB
ANION GAP SERPL CALC-SCNC: 15 MMOL/L — SIGNIFICANT CHANGE UP (ref 5–17)
BUN SERPL-MCNC: 56 MG/DL — HIGH (ref 7–23)
CALCIUM SERPL-MCNC: 8.8 MG/DL — SIGNIFICANT CHANGE UP (ref 8.4–10.5)
CHLORIDE SERPL-SCNC: 100 MMOL/L — SIGNIFICANT CHANGE UP (ref 96–108)
CK SERPL-CCNC: 1611 U/L — HIGH (ref 30–200)
CO2 SERPL-SCNC: 24 MMOL/L — SIGNIFICANT CHANGE UP (ref 22–31)
CREAT SERPL-MCNC: 1.25 MG/DL — SIGNIFICANT CHANGE UP (ref 0.5–1.3)
EGFR: 63 ML/MIN/1.73M2 — SIGNIFICANT CHANGE UP
GLUCOSE BLDC GLUCOMTR-MCNC: 133 MG/DL — HIGH (ref 70–99)
GLUCOSE BLDC GLUCOMTR-MCNC: 146 MG/DL — HIGH (ref 70–99)
GLUCOSE BLDC GLUCOMTR-MCNC: 210 MG/DL — HIGH (ref 70–99)
GLUCOSE BLDC GLUCOMTR-MCNC: 259 MG/DL — HIGH (ref 70–99)
GLUCOSE SERPL-MCNC: 197 MG/DL — HIGH (ref 70–99)
POTASSIUM SERPL-MCNC: 4.4 MMOL/L — SIGNIFICANT CHANGE UP (ref 3.5–5.3)
POTASSIUM SERPL-SCNC: 4.4 MMOL/L — SIGNIFICANT CHANGE UP (ref 3.5–5.3)
SODIUM SERPL-SCNC: 139 MMOL/L — SIGNIFICANT CHANGE UP (ref 135–145)

## 2024-05-07 RX ORDER — POLYETHYLENE GLYCOL 3350 17 G/17G
17 POWDER, FOR SOLUTION ORAL
Qty: 0 | Refills: 0 | DISCHARGE
Start: 2024-05-07

## 2024-05-07 RX ORDER — INSULIN GLARGINE 100 [IU]/ML
28 INJECTION, SOLUTION SUBCUTANEOUS AT BEDTIME
Refills: 0 | Status: DISCONTINUED | OUTPATIENT
Start: 2024-05-07 | End: 2024-05-08

## 2024-05-07 RX ORDER — INSULIN LISPRO 100/ML
12 VIAL (ML) SUBCUTANEOUS ONCE
Refills: 0 | Status: COMPLETED | OUTPATIENT
Start: 2024-05-07 | End: 2024-05-07

## 2024-05-07 RX ORDER — SENNA PLUS 8.6 MG/1
2 TABLET ORAL
Qty: 0 | Refills: 0 | DISCHARGE
Start: 2024-05-07

## 2024-05-07 RX ORDER — ATORVASTATIN CALCIUM 80 MG/1
1 TABLET, FILM COATED ORAL
Refills: 0 | DISCHARGE

## 2024-05-07 RX ORDER — FUROSEMIDE 40 MG
40 TABLET ORAL ONCE
Refills: 0 | Status: COMPLETED | OUTPATIENT
Start: 2024-05-07 | End: 2024-05-07

## 2024-05-07 RX ORDER — EZETIMIBE 10 MG/1
1 TABLET ORAL
Refills: 0 | DISCHARGE

## 2024-05-07 RX ORDER — ACETAMINOPHEN 500 MG
2 TABLET ORAL
Qty: 0 | Refills: 0 | DISCHARGE
Start: 2024-05-07

## 2024-05-07 RX ORDER — METOPROLOL TARTRATE 50 MG
1 TABLET ORAL
Qty: 0 | Refills: 0 | DISCHARGE
Start: 2024-05-07

## 2024-05-07 RX ORDER — INSULIN LISPRO 100/ML
12 VIAL (ML) SUBCUTANEOUS
Refills: 0 | Status: DISCONTINUED | OUTPATIENT
Start: 2024-05-07 | End: 2024-05-08

## 2024-05-07 RX ADMIN — Medication 650 MILLIGRAM(S): at 06:00

## 2024-05-07 RX ADMIN — Medication 3: at 08:32

## 2024-05-07 RX ADMIN — Medication 12 UNIT(S): at 12:05

## 2024-05-07 RX ADMIN — Medication 12 UNIT(S): at 16:54

## 2024-05-07 RX ADMIN — ISOSORBIDE DINITRATE 30 MILLIGRAM(S): 5 TABLET ORAL at 17:00

## 2024-05-07 RX ADMIN — Medication 50 MILLIGRAM(S): at 05:10

## 2024-05-07 RX ADMIN — ISOSORBIDE DINITRATE 30 MILLIGRAM(S): 5 TABLET ORAL at 11:24

## 2024-05-07 RX ADMIN — INSULIN GLARGINE 28 UNIT(S): 100 INJECTION, SOLUTION SUBCUTANEOUS at 21:51

## 2024-05-07 RX ADMIN — Medication 40 MILLIGRAM(S): at 05:01

## 2024-05-07 RX ADMIN — CLOPIDOGREL BISULFATE 75 MILLIGRAM(S): 75 TABLET, FILM COATED ORAL at 11:24

## 2024-05-07 RX ADMIN — APIXABAN 2.5 MILLIGRAM(S): 2.5 TABLET, FILM COATED ORAL at 05:02

## 2024-05-07 RX ADMIN — Medication 650 MILLIGRAM(S): at 05:02

## 2024-05-07 RX ADMIN — ISOSORBIDE DINITRATE 30 MILLIGRAM(S): 5 TABLET ORAL at 05:01

## 2024-05-07 RX ADMIN — Medication 50 MILLIGRAM(S): at 17:00

## 2024-05-07 RX ADMIN — Medication 2: at 12:05

## 2024-05-07 RX ADMIN — Medication 32 MILLIGRAM(S): at 05:02

## 2024-05-07 RX ADMIN — APIXABAN 2.5 MILLIGRAM(S): 2.5 TABLET, FILM COATED ORAL at 17:00

## 2024-05-07 RX ADMIN — Medication 12 UNIT(S): at 08:32

## 2024-05-07 RX ADMIN — CHLORHEXIDINE GLUCONATE 1 APPLICATION(S): 213 SOLUTION TOPICAL at 11:25

## 2024-05-07 RX ADMIN — Medication 40 MILLIGRAM(S): at 20:22

## 2024-05-07 NOTE — PROGRESS NOTE ADULT - ASSESSMENT
70 y/o male with hx of CAD s/p multiple  stents (most recent to pLM (70%), pLAD, mLAD on 1/8/24 with Dr. Dominguez), HFrEF , A-fib, HTN, HLD, T2DM,  CKD3, former smoker  Assessment  DMT2: 69y Male with DM T2 with hyperglycemia, A1C 7.6% , was on oral meds and insulin at home  (Farxiga and Semglee basal insulin), now on basal bolus insulins, sugars are still running high.  was started on steroids, having steroid induced hyperglycemias  CAD: on medications, stable, monitored.   HTN: on antihypertensive medications, monitored, asymptomatic.  CKD: Monitor labs/BMP, renal follow up. rhabdo, muscle bx jane Rossi MD  Cell: [phone]  Office: [phone]

## 2024-05-07 NOTE — PROGRESS NOTE ADULT - SUBJECTIVE AND OBJECTIVE BOX
Date of service: 24 @ 12:38      Patient is a 69y old  Male who presents with a chief complaint of Rhabdo (07 May 2024 08:37)                                                               INTERVAL HPI/OVERNIGHT EVENTS:    REVIEW OF SYSTEMS:     CONSTITUTIONAL: No weakness, fevers or chills  RESPIRATORY: No cough, wheezing,  No shortness of breath  CARDIOVASCULAR: No chest pain or palpitations  GASTROINTESTINAL: No abdominal pain  . No nausea, vomiting, or hematemesis; No diarrhea or constipation. No melena or hematochezia.  GENITOURINARY: No dysuria, frequency or hematuria  NEUROLOGICAL: No numbness or weakness                                                                                                                                                                                                                                                                                 Medications:  MEDICATIONS  (STANDING):  apixaban 2.5 milliGRAM(s) Oral every 12 hours  chlorhexidine 2% Cloths 1 Application(s) Topical daily  clopidogrel Tablet 75 milliGRAM(s) Oral daily  dextrose 10% Bolus 125 milliLiter(s) IV Bolus once  dextrose 5%. 1000 milliLiter(s) (100 mL/Hr) IV Continuous <Continuous>  dextrose 5%. 1000 milliLiter(s) (50 mL/Hr) IV Continuous <Continuous>  dextrose 50% Injectable 25 Gram(s) IV Push once  dextrose 50% Injectable 12.5 Gram(s) IV Push once  furosemide   Injectable 40 milliGRAM(s) IV Push daily  glucagon  Injectable 1 milliGRAM(s) IntraMuscular once  influenza  Vaccine (HIGH DOSE) 0.7 milliLiter(s) IntraMuscular once  insulin glargine Injectable (LANTUS) 28 Unit(s) SubCutaneous at bedtime  insulin lispro (ADMELOG) corrective regimen sliding scale   SubCutaneous three times a day before meals  insulin lispro (ADMELOG) corrective regimen sliding scale   SubCutaneous at bedtime  insulin lispro Injectable (ADMELOG) 12 Unit(s) SubCutaneous three times a day before meals  isosorbide   dinitrate Tablet (ISORDIL) 30 milliGRAM(s) Oral three times a day  methylPREDNISolone 32 milliGRAM(s) Oral daily  metoprolol tartrate 50 milliGRAM(s) Oral two times a day  polyethylene glycol 3350 17 Gram(s) Oral two times a day  senna 2 Tablet(s) Oral at bedtime  silver sulfADIAZINE 1% Cream 1 Application(s) Topical daily    MEDICATIONS  (PRN):  acetaminophen     Tablet .. 650 milliGRAM(s) Oral every 6 hours PRN Temp greater or equal to 38C (100.4F), Mild Pain (1 - 3)  bisacodyl 5 milliGRAM(s) Oral every 12 hours PRN Constipation  dextrose Oral Gel 15 Gram(s) Oral once PRN Blood Glucose LESS THAN 70 milliGRAM(s)/deciliter       Allergies    atorvastatin (Muscle Pain (Severe))    Intolerances      Vital Signs Last 24 Hrs  T(C): 36.5 (07 May 2024 11:09), Max: 36.7 (07 May 2024 04:23)  T(F): 97.7 (07 May 2024 11:09), Max: 98 (07 May 2024 04:23)  HR: 71 (07 May 2024 11:09) (71 - 92)  BP: 119/70 (07 May 2024 11:09) (119/70 - 137/72)  BP(mean): --  RR: 18 (07 May 2024 11:09) (18 - 18)  SpO2: 97% (07 May 2024 11:09) (97% - 97%)    Parameters below as of 07 May 2024 11:09  Patient On (Oxygen Delivery Method): room air      CAPILLARY BLOOD GLUCOSE      POCT Blood Glucose.: 210 mg/dL (07 May 2024 11:44)  POCT Blood Glucose.: 259 mg/dL (07 May 2024 08:01)  POCT Blood Glucose.: 316 mg/dL (06 May 2024 22:10)  POCT Blood Glucose.: 266 mg/dL (06 May 2024 16:35)      05-06 @ 07:01  -  05-07 @ 07:00  --------------------------------------------------------  IN: 1600 mL / OUT: 4325 mL / NET: -2725 mL      Physical Exam:    Daily     Daily Weight in k.8 (07 May 2024 08:12)  General:  Well appearing, NAD, not cachetic  HEENT:  Nonicteric, PERRLA  CV:  RRR, S1S2   Lungs:  CTA B/L, no wheezes, rales, rhonchi  Abdomen:  Soft, non-tender, no distended, positive BS  Extremities:  edema     Neuro:  AAOx3, non-focal, grossly intact                                                                                                                                                                                                                                                                                                LABS:                               9.3    23.34 )-----------( 396      ( 06 May 2024 05:27 )             29.7                          136  |  102  |  63<H>  ----------------------------<  329<H>  4.3   |  22  |  1.34<H>    Ca    8.6      06 May 2024 05:27                         RADIOLOGY & ADDITIONAL TESTS         I personally reviewed: [  ]EKG   [  ]CXR    [  ] CT      A/P:         Discussed with :     Derek consultants' Notes   Time spent :

## 2024-05-07 NOTE — PROGRESS NOTE ADULT - SUBJECTIVE AND OBJECTIVE BOX
Chief complaint    Patient is a 69y old  Male who presents with a chief complaint of Rhabdo (07 May 2024 12:37)   Review of systems  Patient appears comfortable.    Labs and Fingersticks  CAPILLARY BLOOD GLUCOSE      POCT Blood Glucose.: 210 mg/dL (07 May 2024 11:44)  POCT Blood Glucose.: 259 mg/dL (07 May 2024 08:01)  POCT Blood Glucose.: 316 mg/dL (06 May 2024 22:10)  POCT Blood Glucose.: 266 mg/dL (06 May 2024 16:35)      Anion Gap: 15 (05-07 @ 12:18)  Anion Gap: 12 (05-06 @ 05:27)      Calcium: 8.8 (05-07 @ 12:18)  Calcium: 8.6 (05-06 @ 05:27)          05-07    139  |  100  |  56<H>  ----------------------------<  197<H>  4.4   |  24  |  1.25    Ca    8.8      07 May 2024 12:18                          9.3    23.34 )-----------( 396      ( 06 May 2024 05:27 )             29.7     Medications  MEDICATIONS  (STANDING):  apixaban 2.5 milliGRAM(s) Oral every 12 hours  chlorhexidine 2% Cloths 1 Application(s) Topical daily  clopidogrel Tablet 75 milliGRAM(s) Oral daily  dextrose 10% Bolus 125 milliLiter(s) IV Bolus once  dextrose 5%. 1000 milliLiter(s) (100 mL/Hr) IV Continuous <Continuous>  dextrose 5%. 1000 milliLiter(s) (50 mL/Hr) IV Continuous <Continuous>  dextrose 50% Injectable 12.5 Gram(s) IV Push once  dextrose 50% Injectable 25 Gram(s) IV Push once  furosemide   Injectable 40 milliGRAM(s) IV Push daily  glucagon  Injectable 1 milliGRAM(s) IntraMuscular once  influenza  Vaccine (HIGH DOSE) 0.7 milliLiter(s) IntraMuscular once  insulin glargine Injectable (LANTUS) 28 Unit(s) SubCutaneous at bedtime  insulin lispro (ADMELOG) corrective regimen sliding scale   SubCutaneous at bedtime  insulin lispro (ADMELOG) corrective regimen sliding scale   SubCutaneous three times a day before meals  insulin lispro Injectable (ADMELOG) 12 Unit(s) SubCutaneous three times a day before meals  isosorbide   dinitrate Tablet (ISORDIL) 30 milliGRAM(s) Oral three times a day  methylPREDNISolone 32 milliGRAM(s) Oral daily  metoprolol tartrate 50 milliGRAM(s) Oral two times a day  polyethylene glycol 3350 17 Gram(s) Oral two times a day  senna 2 Tablet(s) Oral at bedtime  silver sulfADIAZINE 1% Cream 1 Application(s) Topical daily      Physical Exam  General: Patient appears comfortable.  Vital Signs Last 12 Hrs  T(F): 97.7 (05-07-24 @ 11:09), Max: 98 (05-07-24 @ 04:23)  HR: 71 (05-07-24 @ 11:09) (71 - 71)  BP: 119/70 (05-07-24 @ 11:09) (119/70 - 129/68)  BP(mean): --  RR: 18 (05-07-24 @ 11:09) (18 - 18)  SpO2: 97% (05-07-24 @ 11:09) (97% - 97%)  Neck: No palpable thyroid nodules.  CVS: S1S2, No murmurs  Respiratory: No wheezing, no crepitations  GI: Abdomen soft, non tender.    Diagnostics        Radiology:

## 2024-05-07 NOTE — PROGRESS NOTE ADULT - SUBJECTIVE AND OBJECTIVE BOX
Overnight events noted      VITAL:  T(C): , Max: 36.7 (05-06-24 @ 11:07)  T(F): , Max: 98 (05-06-24 @ 11:07)  HR: 71 (05-07-24 @ 04:23)  BP: 129/68 (05-07-24 @ 04:23)  BP(mean): --  RR: 18 (05-07-24 @ 04:23)  SpO2: 97% (05-07-24 @ 04:23)  Wt(kg): --      PHYSICAL EXAM:  Constitutional: alert, NAD  HEENT: NCAT, MMM  Neck: Supple, No JVD  Respiratory: CTA-b/l  Cardiovascular: RRR s1s2, no m/r/g  Gastrointestinal: BS+, soft, NT/ND  Extremities: (+)b/l LE edema/mild tenderness  Neurological: no focal deficits; strength grossly intact  Back: no CVAT b/l  Skin: No rashes, no nevi    LABS:                        9.3    23.34 )-----------( 396      ( 06 May 2024 05:27 )             29.7     Na(136)/K(4.3)/Cl(102)/HCO3(22)/BUN(63)/Cr(1.34)Glu(329)/Ca(8.6)/Mg(--)/PO4(--)    05-06 @ 05:27  Na(136)/K(4.5)/Cl(103)/HCO3(22)/BUN(61)/Cr(1.43)Glu(293)/Ca(8.2)/Mg(--)/PO4(--)    05-05 @ 05:34      IMPRESSION: 69M w/ HTN, DM2, CAD, AFib, HFrEF, and CKD, s/p recent PCI, 4/25/24 returned with acute rhabdomyolysis    (1)Renal - CKD3b - due to diabetes/hypertension - at/near baseline  (2)Rhabdo - improving, off IVF  (3)Metabolic acidosis - resolved  (4)Rheum - 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 - Surgery and Rheum on board- s/p muscle biopsy 5/2. CPK last checked 5/5 - we need to repeat today  (5)Constipation    RECOMMEND:  (1)BMP+CPK STAT this a.m.; restart NS 75cc/h if CPK >3000  (2)Can continue Lasix 40mg iv daily as ordered  (3)F/U muscle biopsy results  (4)Enema prn              Markell Walton MD  [facility]  Office/on call physician: [phone]  Cell (7a-7p): [phone]       No pain, no sob      VITAL:  T(C): , Max: 36.7 (05-06-24 @ 11:07)  T(F): , Max: 98 (05-06-24 @ 11:07)  HR: 71 (05-07-24 @ 04:23)  BP: 129/68 (05-07-24 @ 04:23)  BP(mean): --  RR: 18 (05-07-24 @ 04:23)  SpO2: 97% (05-07-24 @ 04:23)  Wt(kg): --      PHYSICAL EXAM:  Constitutional: alert, NAD  HEENT: NCAT, MMM  Neck: Supple, No JVD  Respiratory: CTA-b/l  Cardiovascular: RRR s1s2, no m/r/g  Gastrointestinal: BS+, soft, NT/ND  Extremities: (+)b/l LE edema/mild tenderness  Neurological: no focal deficits; strength grossly intact  Back: no CVAT b/l  Skin: No rashes, no nevi    LABS:                        9.3    23.34 )-----------( 396      ( 06 May 2024 05:27 )             29.7     Na(136)/K(4.3)/Cl(102)/HCO3(22)/BUN(63)/Cr(1.34)Glu(329)/Ca(8.6)/Mg(--)/PO4(--)    05-06 @ 05:27  Na(136)/K(4.5)/Cl(103)/HCO3(22)/BUN(61)/Cr(1.43)Glu(293)/Ca(8.2)/Mg(--)/PO4(--)    05-05 @ 05:34      IMPRESSION: 69M w/ HTN, DM2, CAD, AFib, HFrEF, and CKD, s/p recent PCI, 4/25/24 returned with acute rhabdomyolysis    (1)Renal - CKD3b - due to diabetes/hypertension - at/near baseline  (2)Rhabdo - improving, off IVF  (3)Metabolic acidosis - resolved  (4)Rheum - 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 - Surgery and Rheum on board- s/p muscle biopsy 5/2. CPK last checked 5/5 - we need to repeat today  (5)Constipation    RECOMMEND:  (1)BMP+CPK STAT this a.m.; restart NS 75cc/h if CPK >3000  (2)Can continue Lasix 40mg iv daily as ordered  (3)F/U muscle biopsy results  (4)Enema prn              Markell Walton MD  [facility]  Office/on call physician: [phone]  Cell (7a-7p): [phone]

## 2024-05-07 NOTE — PROGRESS NOTE ADULT - ASSESSMENT
68 y/o man      h/o severe CAD S/P multiple PCI's including multiple LM and LAD stents.       +A-fib, HTN, HLD, T2DM, CKD3, former smoker     presents with  weaknss/   leg  pains.  no leg  weakness      + troponin /  elevated  cpk      CAD,  has   10  stents/  HTN/  HLD        cath,   4/18,  2.0 mm caliber TITO to distal LAD,Dr ILDA Bernal        on  dapt/  ef   47     troponin,  likely   demand  ischemia.  not  mi  per  dr arellano,,  not a  candidate   for  further intervention,   small  caliber  vessels   c/c  Afib,  on eliquis         elevated  CPK,    suspect  statin induced.  rhabdomyolysis/  form drug vs  polymyositis,    need  to  r/o  polymyositis, vs  drug induced   myositis,    mri  pelvis,  myositis/  fx  femoral condyle.  ortho  eval  noted,  rec immobilizer  pt refusing    ct a/p, neck, no  malignancy   await   bx   by   surg DR. palomino/  on iv  steroid  per   rheum   cont off statin   fu BX   follow  path  monitor  MACARENA   cont steroids ..   rheuym input noted   change to medrol   fu bx       afib restarted eliquis     renal failure : monitor       elevated lfts,   abd  u/s,  normal liver       PAD/  will  f/p  a s an out pt with vascular. when stable   on asa/  eliquis, plavix, / lasix, imdur.  valsartan.  toprol      hold   Farxiga   and  valsartan for  now,  iv fluids,  per  renal   c/c  anemia            CAD : stent/on  4/17/24.   only   option , to   temporarily hold  eliquis  and   continue   plavix,   and   card  dr bravo and  dr brijesh bernal in  agreement    wbc  noted, from steroid,  cpk/  lfts decreasing     edema : dc ivf   increase lasix   and change to po upon dc     rehab   discussed at length with acp and pt  and with CM   dc planning                 -

## 2024-05-07 NOTE — PROGRESS NOTE ADULT - PROBLEM SELECTOR PLAN 1
Will increase Lantus to 28 units at bed time.  Will increase Admelog to 12 units before each meal in addition to Admelog correction scale coverage.  Patient counseled for compliance with consistent low carb diet.  .   Insulins to be adjusted pending steroid taper  DC on home Semglee   Stop home Farxiga altogther and any SGLT2i due to recent BARRY/rhabdo  OP follow up

## 2024-05-08 LAB
BLD GP AB SCN SERPL QL: POSITIVE — SIGNIFICANT CHANGE UP
GLUCOSE BLDC GLUCOMTR-MCNC: 138 MG/DL — HIGH (ref 70–99)
GLUCOSE BLDC GLUCOMTR-MCNC: 141 MG/DL — HIGH (ref 70–99)
GLUCOSE BLDC GLUCOMTR-MCNC: 87 MG/DL — SIGNIFICANT CHANGE UP (ref 70–99)
GLUCOSE BLDC GLUCOMTR-MCNC: 96 MG/DL — SIGNIFICANT CHANGE UP (ref 70–99)
HCT VFR BLD CALC: 30.3 % — LOW (ref 39–50)
HGB BLD-MCNC: 9.9 G/DL — LOW (ref 13–17)
MCHC RBC-ENTMCNC: 29 PG — SIGNIFICANT CHANGE UP (ref 27–34)
MCHC RBC-ENTMCNC: 32.7 GM/DL — SIGNIFICANT CHANGE UP (ref 32–36)
MCV RBC AUTO: 88.9 FL — SIGNIFICANT CHANGE UP (ref 80–100)
NRBC # BLD: 0 /100 WBCS — SIGNIFICANT CHANGE UP (ref 0–0)
PLATELET # BLD AUTO: 429 K/UL — HIGH (ref 150–400)
RBC # BLD: 3.41 M/UL — LOW (ref 4.2–5.8)
RBC # FLD: 18 % — HIGH (ref 10.3–14.5)
RH IG SCN BLD-IMP: POSITIVE — SIGNIFICANT CHANGE UP
WBC # BLD: 29.58 K/UL — HIGH (ref 3.8–10.5)
WBC # FLD AUTO: 29.58 K/UL — HIGH (ref 3.8–10.5)

## 2024-05-08 PROCEDURE — 76882 US LMTD JT/FCL EVL NVASC XTR: CPT | Mod: 26,RT

## 2024-05-08 PROCEDURE — 99231 SBSQ HOSP IP/OBS SF/LOW 25: CPT

## 2024-05-08 RX ORDER — OXYCODONE HYDROCHLORIDE 5 MG/1
5 TABLET ORAL ONCE
Refills: 0 | Status: DISCONTINUED | OUTPATIENT
Start: 2024-05-08 | End: 2024-05-08

## 2024-05-08 RX ORDER — INSULIN GLARGINE 100 [IU]/ML
24 INJECTION, SOLUTION SUBCUTANEOUS AT BEDTIME
Refills: 0 | Status: DISCONTINUED | OUTPATIENT
Start: 2024-05-08 | End: 2024-05-09

## 2024-05-08 RX ORDER — INSULIN LISPRO 100/ML
9 VIAL (ML) SUBCUTANEOUS
Refills: 0 | Status: DISCONTINUED | OUTPATIENT
Start: 2024-05-08 | End: 2024-05-09

## 2024-05-08 RX ORDER — FUROSEMIDE 40 MG
40 TABLET ORAL
Refills: 0 | Status: DISCONTINUED | OUTPATIENT
Start: 2024-05-08 | End: 2024-05-14

## 2024-05-08 RX ADMIN — Medication 12 UNIT(S): at 09:02

## 2024-05-08 RX ADMIN — Medication 32 MILLIGRAM(S): at 05:17

## 2024-05-08 RX ADMIN — OXYCODONE HYDROCHLORIDE 5 MILLIGRAM(S): 5 TABLET ORAL at 10:24

## 2024-05-08 RX ADMIN — Medication 50 MILLIGRAM(S): at 05:20

## 2024-05-08 RX ADMIN — Medication 650 MILLIGRAM(S): at 06:55

## 2024-05-08 RX ADMIN — Medication 650 MILLIGRAM(S): at 21:55

## 2024-05-08 RX ADMIN — APIXABAN 2.5 MILLIGRAM(S): 2.5 TABLET, FILM COATED ORAL at 17:45

## 2024-05-08 RX ADMIN — Medication 40 MILLIGRAM(S): at 14:43

## 2024-05-08 RX ADMIN — CLOPIDOGREL BISULFATE 75 MILLIGRAM(S): 75 TABLET, FILM COATED ORAL at 12:18

## 2024-05-08 RX ADMIN — Medication 40 MILLIGRAM(S): at 05:17

## 2024-05-08 RX ADMIN — ISOSORBIDE DINITRATE 30 MILLIGRAM(S): 5 TABLET ORAL at 17:46

## 2024-05-08 RX ADMIN — Medication 650 MILLIGRAM(S): at 22:55

## 2024-05-08 RX ADMIN — Medication 650 MILLIGRAM(S): at 00:20

## 2024-05-08 RX ADMIN — ISOSORBIDE DINITRATE 30 MILLIGRAM(S): 5 TABLET ORAL at 12:18

## 2024-05-08 RX ADMIN — Medication 1 APPLICATION(S): at 12:19

## 2024-05-08 RX ADMIN — OXYCODONE HYDROCHLORIDE 5 MILLIGRAM(S): 5 TABLET ORAL at 09:24

## 2024-05-08 RX ADMIN — INSULIN GLARGINE 24 UNIT(S): 100 INJECTION, SOLUTION SUBCUTANEOUS at 21:54

## 2024-05-08 RX ADMIN — APIXABAN 2.5 MILLIGRAM(S): 2.5 TABLET, FILM COATED ORAL at 05:21

## 2024-05-08 RX ADMIN — Medication 50 MILLIGRAM(S): at 17:45

## 2024-05-08 RX ADMIN — ISOSORBIDE DINITRATE 30 MILLIGRAM(S): 5 TABLET ORAL at 05:18

## 2024-05-08 RX ADMIN — Medication 650 MILLIGRAM(S): at 01:20

## 2024-05-08 RX ADMIN — CHLORHEXIDINE GLUCONATE 1 APPLICATION(S): 213 SOLUTION TOPICAL at 12:20

## 2024-05-08 RX ADMIN — Medication 9 UNIT(S): at 17:20

## 2024-05-08 RX ADMIN — Medication 12 UNIT(S): at 12:17

## 2024-05-08 NOTE — PROGRESS NOTE ADULT - ASSESSMENT
70 y/o man      h/o severe CAD S/P multiple PCI's including multiple LM and LAD stents.       +A-fib, HTN, HLD, T2DM, CKD3, former smoker     presents with  weaknss/   leg  pains.  no leg  weakness      + troponin /  elevated  cpk      CAD,  has   10  stents/  HTN/  HLD        cath,   4/18,  2.0 mm caliber TITO to distal LAD,Dr ILDA Bernal        on  dapt/  ef   47     troponin,  likely   demand  ischemia.  not  mi  per  dr arellano,,  not a  candidate   for  further intervention,   small  caliber  vessels   c/c  Afib,  on eliquis         elevated  CPK,    suspect  statin induced.  rhabdomyolysis/  form drug vs  polymyositis,    need  to  r/o  polymyositis, vs  drug induced   myositis,    mri  pelvis,  myositis/  fx  femoral condyle.  ortho  eval  noted,  rec immobilizer  pt refusing    ct a/p, neck, no  malignancy   await   bx   by   surg DR. palomino/  on iv  steroid  per   rheum   cont off statin   fu BX   follow  path  monitor  MACARENA   cont steroids ..   rheuym input noted   change to medrol   fu bx       afib restarted eliquis     renal failure : monitor       elevated lfts,   abd  u/s,  normal liver       PAD/  will  f/p  a s an out pt with vascular. when stable   on asa/  eliquis, plavix, / lasix, imdur.  valsartan.  toprol      hold   Farxiga   and  valsartan for  now,  iv fluids,  per  renal   c/c  anemia            CAD : stent/on  4/17/24.   only   option , to   temporarily hold  eliquis  and   continue   plavix,   and   card  dr bravo and  dr brijesh bernal in  agreement    wbc  noted, from steroid,  cpk/  lfts decreasing     edema : dc ivf   increase lasix .. will give one extra dose today   and change to po upon dc     rehab   discussed at length with acp and pt  and with CM   dc planning   filled out forms for pt's work                   -

## 2024-05-08 NOTE — PROGRESS NOTE ADULT - PROBLEM SELECTOR PLAN 1
Will continue Lantus to 28 units at bed time.  Will continue  Admelog to 12 units before each meal in addition to Admelog correction scale coverage.  Patient counseled for compliance with consistent low carb diet.     Insulins to be adjusted pending steroid taper  DC on home Semglee   Stop home Farxiga altogther and any SGLT2i due to recent BARRY/rhabdo  OP follow up

## 2024-05-08 NOTE — PROGRESS NOTE ADULT - SUBJECTIVE AND OBJECTIVE BOX
Overnight events noted      VITAL:  T(C): , Max: 36.6 (05-07-24 @ 21:05)  T(F): , Max: 97.9 (05-07-24 @ 21:05)  HR: 98 (05-08-24 @ 04:18)  BP: 150/77 (05-08-24 @ 04:18)  BP(mean): --  RR: 18 (05-08-24 @ 04:18)  SpO2: 98% (05-08-24 @ 04:18)  Wt(kg): --      PHYSICAL EXAM:  Constitutional: alert, NAD  HEENT: NCAT, MMM  Neck: Supple, No JVD  Respiratory: CTA-b/l  Cardiovascular: RRR s1s2, no m/r/g  Gastrointestinal: BS+, soft, NT/ND  Extremities: (+)b/l LE edema/mild tenderness  Neurological: no focal deficits; strength grossly intact  Back: no CVAT b/l  Skin: No rashes, no nevi    LABS:    Na(139)/K(4.4)/Cl(100)/HCO3(24)/BUN(56)/Cr(1.25)Glu(197)/Ca(8.8)/Mg(--)/PO4(--)    05-07 @ 12:18  Na(136)/K(4.3)/Cl(102)/HCO3(22)/BUN(63)/Cr(1.34)Glu(329)/Ca(8.6)/Mg(--)/PO4(--)    05-06 @ 05:27    (5/7/24) - CPK 1611      IMPRESSION: 69M w/ HTN, DM2, CAD, AFib, HFrEF, and CKD, s/p recent PCI, 4/25/24 returned with acute rhabdomyolysis    (1)Renal - CKD3b - due to diabetes/hypertension - at/near baseline  (2)Rhabdo - improving, off IVF  (3)Lytes - acceptable  (4)Rheum - 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 - Surgery and Rheum on board- s/p muscle biopsy 5/2, results pending      RECOMMEND:  (1)BMP+CPK daily  (2)Can continue Lasix 40mg iv daily as ordered; no IVF for now  (3)F/U muscle biopsy results        Markell Walton MD  [facility]  Office/on call physician: [phone]  Cell (7a-7p): [phone]       LE pain slowly improving, LE weakness slowly improving      VITAL:  T(C): , Max: 36.6 (05-07-24 @ 21:05)  T(F): , Max: 97.9 (05-07-24 @ 21:05)  HR: 98 (05-08-24 @ 04:18)  BP: 150/77 (05-08-24 @ 04:18)  RR: 18 (05-08-24 @ 04:18)  SpO2: 98% (05-08-24 @ 04:18)      PHYSICAL EXAM:  Constitutional: alert, NAD  HEENT: NCAT, MMM  Neck: Supple, No JVD  Respiratory: CTA-b/l  Cardiovascular: RRR s1s2, no m/r/g  Gastrointestinal: BS+, soft, NT/ND  Extremities: (+)b/l LE edema/mild tenderness  Neurological: no focal deficits; strength grossly intact  Back: no CVAT b/l  Skin: No rashes, no nevi    LABS:    Na(139)/K(4.4)/Cl(100)/HCO3(24)/BUN(56)/Cr(1.25)Glu(197)/Ca(8.8)/Mg(--)/PO4(--)    05-07 @ 12:18  Na(136)/K(4.3)/Cl(102)/HCO3(22)/BUN(63)/Cr(1.34)Glu(329)/Ca(8.6)/Mg(--)/PO4(--)    05-06 @ 05:27    (5/7/24) - CPK 1611      IMPRESSION: 69M w/ HTN, DM2, CAD, AFib, HFrEF, and CKD, s/p recent PCI, 4/25/24 returned with acute rhabdomyolysis    (1)Renal - CKD3b - due to diabetes/hypertension - at/near baseline  (2)Rhabdo - improving, off IVF  (3)Lytes - acceptable  (4)Rheum - 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 - Surgery and Rheum on board- s/p muscle biopsy 5/2, results pending      RECOMMEND:  (1)BMP+CPK daily  (2)Can continue Lasix 40mg iv daily as ordered; no IVF for now  (3)F/U muscle biopsy results        Markell Walton MD  [facility]  Office/on call physician: [phone]  Cell (7a-7p): [phone]

## 2024-05-08 NOTE — PROGRESS NOTE ADULT - SUBJECTIVE AND OBJECTIVE BOX
Date of service: 24 @ 14:59      Patient is a 69y old  Male who presents with a chief complaint of Rhabdo (08 May 2024 14:25)                                                               INTERVAL HPI/OVERNIGHT EVENTS:    REVIEW OF SYSTEMS:     CONSTITUTIONAL: No weakness, fevers or chills  EYES/ENT: No visual changes , no ear ache   NECK: No pain or stiffness  RESPIRATORY: No cough, wheezing,  No shortness of breath  CARDIOVASCULAR: No chest pain or palpitations  GASTROINTESTINAL: No abdominal pain  . No nausea, vomiting, or hematemesis; No diarrhea or constipation. No melena or hematochezia.  GENITOURINARY: No dysuria, frequency or hematuria  NEUROLOGICAL: No numbness or weakness  R thigh pain                                                                                                                                                                                                                                                                                 Medications:  MEDICATIONS  (STANDING):  apixaban 2.5 milliGRAM(s) Oral every 12 hours  chlorhexidine 2% Cloths 1 Application(s) Topical daily  clopidogrel Tablet 75 milliGRAM(s) Oral daily  dextrose 10% Bolus 125 milliLiter(s) IV Bolus once  dextrose 5%. 1000 milliLiter(s) (100 mL/Hr) IV Continuous <Continuous>  dextrose 5%. 1000 milliLiter(s) (50 mL/Hr) IV Continuous <Continuous>  dextrose 50% Injectable 12.5 Gram(s) IV Push once  dextrose 50% Injectable 25 Gram(s) IV Push once  furosemide    Tablet 40 milliGRAM(s) Oral two times a day  glucagon  Injectable 1 milliGRAM(s) IntraMuscular once  influenza  Vaccine (HIGH DOSE) 0.7 milliLiter(s) IntraMuscular once  insulin glargine Injectable (LANTUS) 28 Unit(s) SubCutaneous at bedtime  insulin lispro (ADMELOG) corrective regimen sliding scale   SubCutaneous at bedtime  insulin lispro (ADMELOG) corrective regimen sliding scale   SubCutaneous three times a day before meals  insulin lispro Injectable (ADMELOG) 12 Unit(s) SubCutaneous three times a day before meals  isosorbide   dinitrate Tablet (ISORDIL) 30 milliGRAM(s) Oral three times a day  methylPREDNISolone 32 milliGRAM(s) Oral daily  metoprolol tartrate 50 milliGRAM(s) Oral two times a day  polyethylene glycol 3350 17 Gram(s) Oral two times a day  senna 2 Tablet(s) Oral at bedtime  silver sulfADIAZINE 1% Cream 1 Application(s) Topical daily    MEDICATIONS  (PRN):  acetaminophen     Tablet .. 650 milliGRAM(s) Oral every 6 hours PRN Temp greater or equal to 38C (100.4F), Mild Pain (1 - 3)  bisacodyl 5 milliGRAM(s) Oral every 12 hours PRN Constipation  dextrose Oral Gel 15 Gram(s) Oral once PRN Blood Glucose LESS THAN 70 milliGRAM(s)/deciliter       Allergies    atorvastatin (Muscle Pain (Severe))    Intolerances      Vital Signs Last 24 Hrs  T(C): 36.4 (08 May 2024 12:19), Max: 36.6 (07 May 2024 21:05)  T(F): 97.6 (08 May 2024 12:19), Max: 97.9 (07 May 2024 21:05)  HR: 71 (08 May 2024 14:51) (71 - 98)  BP: 116/65 (08 May 2024 14:51) (116/65 - 150/77)  BP(mean): --  RR: 18 (08 May 2024 12:19) (18 - 18)  SpO2: 97% (08 May 2024 12:19) (97% - 98%)    Parameters below as of 08 May 2024 12:19  Patient On (Oxygen Delivery Method): room air      CAPILLARY BLOOD GLUCOSE      POCT Blood Glucose.: 96 mg/dL (08 May 2024 11:51)  POCT Blood Glucose.: 138 mg/dL (08 May 2024 08:20)  POCT Blood Glucose.: 133 mg/dL (07 May 2024 21:43)  POCT Blood Glucose.: 146 mg/dL (07 May 2024 16:33)       @ 07:  -   @ 07:00  --------------------------------------------------------  IN: 1280 mL / OUT: 3250 mL / NET: -1970 mL    08 @ 07:  -   @ 14:59  --------------------------------------------------------  IN: 0 mL / OUT: 1000 mL / NET: -1000 mL      Physical Exam:    Daily     Daily Weight in k.5 (08 May 2024 11:26)  General:  Well appearing, NAD, not cachetic  HEENT:  Nonicteric, PERRLA  CV:  RRR, S1S2   Lungs:  CTA B/L, no wheezes, rales, rhonchi  Abdomen:  Soft, non-tender, no distended, positive BS  Extremities:  R thigh [place]   edema   Neuro:  AAOx3, non-focal, grossly intact                                                                                                                                                                                                                                                                                                LABS:                               9.9    29.58 )-----------( 429      ( 08 May 2024 12:59 )             30.3                      05-07    139  |  100  |  56<H>  ----------------------------<  197<H>  4.4   |  24  |  1.25    Ca    8.8      07 May 2024 12:18                         RADIOLOGY & ADDITIONAL TESTS         I personally reviewed: [  ]EKG   [  ]CXR    [  ] CT      A/P:         Discussed with :     Derek consultants' Notes   Time spent :

## 2024-05-08 NOTE — PROGRESS NOTE ADULT - SUBJECTIVE AND OBJECTIVE BOX
No dyspnea   Muscle pain is improved  /77  HR 98  Lungs clear  RR 1/6 systolic murmur  2+ edema bilateral    BUN 56  Crt 1.25   CPK 1611     Imp:  Rhabdomyolysis - much improved  He is fluid overloaded due to the IV hydration needed to clear the CPK  His Renal function is much better than baseline  Rec:  Switch Lasix to 40 mg PO bid  Taper Methylprednisolone to 16 mg qd  Stable for Rehab transfer

## 2024-05-08 NOTE — PROGRESS NOTE ADULT - ASSESSMENT
70 y/o male with hx of CAD s/p multiple  stents (most recent to pLM (70%), pLAD, mLAD on 1/8/24 with Dr. Dominguez), HFrEF , A-fib, HTN, HLD, T2DM,  CKD3, former smoker  Assessment  DMT2: 69y Male with DM T2 with hyperglycemia, A1C 7.6% , was on oral meds and insulin at home  (Farxiga and Semglee basal insulin), now on basal bolus insulins, sugars are starting to trend down.   was started on steroids, having steroid induced hyperglycemias  CAD: on medications, stable, monitored.   HTN: on antihypertensive medications, monitored, asymptomatic.  CKD: Monitor labs/BMP, renal follow up. rhabdo, muscle bx          Discussed plan and management with Dr Flo Jamil NP - TEAMS  Myrna Rossi MD  Cell: [phone]  Office: [phone]      68 y/o male with hx of CAD s/p multiple  stents (most recent to pLM (70%), pLAD, mLAD on 1/8/24 with Dr. Dominguez), HFrEF , A-fib, HTN, HLD, T2DM,  CKD3, former smoker  Assessment  DMT2: 69y Male with DM T2 with hyperglycemia, A1C 7.6% , was on oral meds and insulin at home  (Farxiga and  Semglee basal insulin), now on basal bolus insulins, sugars are starting to trend down.   was started on steroids, having steroid induced hyperglycemias  CAD: on medications, stable, monitored.   HTN: on antihypertensive medications, monitored, asymptomatic.  CKD: Monitor labs/BMP, renal follow up. rhabdo, muscle bx          Discussed plan and management with Dr Flo Jamil NP - TEAMS  Myrna Rossi MD  Cell: [phone]  Office: [phone]

## 2024-05-08 NOTE — PROGRESS NOTE ADULT - SUBJECTIVE AND OBJECTIVE BOX
c/o right leg pain    afeb  right posterior thigh skin incision is healing well without evidence of wound infection  purple ecchymosis over right posterior thigh  soft palpable subcutaneous hematoma at right posterior thigh muscle biopsy site    WBC = 29 (on methylprednisolone)  hgb - stable @ 8-9 g/dL since 5/3

## 2024-05-08 NOTE — PROGRESS NOTE ADULT - SUBJECTIVE AND OBJECTIVE BOX
Chief complaint  Patient is a 69y old  Male who presents with a chief complaint of Rhabdo (08 May 2024 14:56)         Labs and Fingersticks  CAPILLARY BLOOD GLUCOSE      POCT Blood Glucose.: 96 mg/dL (08 May 2024 11:51)  POCT Blood Glucose.: 138 mg/dL (08 May 2024 08:20)  POCT Blood Glucose.: 133 mg/dL (07 May 2024 21:43)  POCT Blood Glucose.: 146 mg/dL (07 May 2024 16:33)      Anion Gap: 15 (05-07 @ 12:18)      Calcium: 8.8 (05-07 @ 12:18)          05-07    139  |  100  |  56<H>  ----------------------------<  197<H>  4.4   |  24  |  1.25    Ca    8.8      07 May 2024 12:18                          9.9    29.58 )-----------( 429      ( 08 May 2024 12:59 )             30.3     Medications  MEDICATIONS  (STANDING):  apixaban 2.5 milliGRAM(s) Oral every 12 hours  chlorhexidine 2% Cloths 1 Application(s) Topical daily  clopidogrel Tablet 75 milliGRAM(s) Oral daily  dextrose 10% Bolus 125 milliLiter(s) IV Bolus once  dextrose 5%. 1000 milliLiter(s) (100 mL/Hr) IV Continuous <Continuous>  dextrose 5%. 1000 milliLiter(s) (50 mL/Hr) IV Continuous <Continuous>  dextrose 50% Injectable 25 Gram(s) IV Push once  dextrose 50% Injectable 12.5 Gram(s) IV Push once  furosemide    Tablet 40 milliGRAM(s) Oral two times a day  glucagon  Injectable 1 milliGRAM(s) IntraMuscular once  influenza  Vaccine (HIGH DOSE) 0.7 milliLiter(s) IntraMuscular once  insulin glargine Injectable (LANTUS) 28 Unit(s) SubCutaneous at bedtime  insulin lispro (ADMELOG) corrective regimen sliding scale   SubCutaneous at bedtime  insulin lispro (ADMELOG) corrective regimen sliding scale   SubCutaneous three times a day before meals  insulin lispro Injectable (ADMELOG) 12 Unit(s) SubCutaneous three times a day before meals  isosorbide   dinitrate Tablet (ISORDIL) 30 milliGRAM(s) Oral three times a day  methylPREDNISolone 32 milliGRAM(s) Oral daily  metoprolol tartrate 50 milliGRAM(s) Oral two times a day  polyethylene glycol 3350 17 Gram(s) Oral two times a day  senna 2 Tablet(s) Oral at bedtime  silver sulfADIAZINE 1% Cream 1 Application(s) Topical daily      Physical Exam  General: Patient comfortable in bed   Vital Signs Last 12 Hrs  T(F): 97.6 (05-08-24 @ 12:19), Max: 97.6 (05-08-24 @ 12:19)  HR: 71 (05-08-24 @ 14:51) (71 - 98)  BP: 116/65 (05-08-24 @ 14:51) (116/65 - 150/77)  BP(mean): --  RR: 18 (05-08-24 @ 12:19) (18 - 18)  SpO2: 97% (05-08-24 @ 12:19) (97% - 98%)    CVS: S1S2   Respiratory: No wheezing, no crepitations  GI: Abdomen soft, bowel sounds positive  Musculoskeletal:  moves all extremities  : Voiding          Chief complaint  Patient is a 69y old  Male who presents with a chief complaint of Rhabdo (08 May 2024 14:56)         Labs and Fingersticks  CAPILLARY BLOOD GLUCOSE      POCT Blood Glucose.: 96 mg/dL (08 May 2024 11:51)  POCT Blood Glucose.: 138 mg/dL (08 May 2024 08:20)  POCT Blood Glucose.: 133 mg/dL (07 May 2024 21:43)  POCT Blood Glucose.: 146 mg/dL (07 May 2024 16:33)      Anion Gap:  15 (05-07 @ 12:18)      Calcium: 8.8 (05-07 @ 12:18)          05-07    139  |  100  |  56<H>  ----------------------------<  197<H>  4.4   |  24  |  1.25    Ca    8.8      07 May 2024 12:18                          9.9    29.58 )-----------( 429      ( 08 May 2024 12:59 )             30.3     Medications  MEDICATIONS  (STANDING):  apixaban 2.5 milliGRAM(s) Oral every 12 hours  chlorhexidine 2% Cloths 1 Application(s) Topical daily  clopidogrel Tablet 75 milliGRAM(s) Oral daily  dextrose 10% Bolus 125 milliLiter(s) IV Bolus once  dextrose 5%. 1000 milliLiter(s) (100 mL/Hr) IV Continuous <Continuous>  dextrose 5%. 1000 milliLiter(s) (50 mL/Hr) IV Continuous <Continuous>  dextrose 50% Injectable 25 Gram(s) IV Push once  dextrose 50% Injectable 12.5 Gram(s) IV Push once  furosemide    Tablet 40 milliGRAM(s) Oral two times a day  glucagon  Injectable 1 milliGRAM(s) IntraMuscular once  influenza  Vaccine (HIGH DOSE) 0.7 milliLiter(s) IntraMuscular once  insulin glargine Injectable (LANTUS) 28 Unit(s) SubCutaneous at bedtime  insulin lispro (ADMELOG) corrective regimen sliding scale   SubCutaneous at bedtime  insulin lispro (ADMELOG) corrective regimen sliding scale   SubCutaneous three times a day before meals  insulin lispro Injectable (ADMELOG) 12 Unit(s) SubCutaneous three times a day before meals  isosorbide   dinitrate Tablet (ISORDIL) 30 milliGRAM(s) Oral three times a day  methylPREDNISolone 32 milliGRAM(s) Oral daily  metoprolol tartrate 50 milliGRAM(s) Oral two times a day  polyethylene glycol 3350 17 Gram(s) Oral two times a day  senna 2 Tablet(s) Oral at bedtime  silver sulfADIAZINE 1% Cream 1 Application(s) Topical daily      Physical Exam  General: Patient comfortable in bed   Vital Signs Last 12 Hrs  T(F): 97.6 (05-08-24 @ 12:19), Max: 97.6 (05-08-24 @ 12:19)  HR: 71 (05-08-24 @ 14:51) (71 - 98)  BP: 116/65 (05-08-24 @ 14:51) (116/65 - 150/77)  BP(mean): --  RR: 18 (05-08-24 @ 12:19) (18 - 18)  SpO2: 97% (05-08-24 @ 12:19) (97% - 98%)    CVS: S1S2   Respiratory: No wheezing, no crepitations  GI: Abdomen soft, bowel sounds positive  Musculoskeletal:  moves all extremities  : Voiding

## 2024-05-08 NOTE — PROGRESS NOTE ADULT - ASSESSMENT
5/2/2024 - right hamstring muscle biopsy  -subcutaneous hematoma will take a few weeks to completely resolve, but it is not clinically significant  -ok to continue Plavix and Eliquis  -f/u pathology from [place]  -please call Acute Care Surgery team if there are any issues

## 2024-05-09 LAB
ADD ON TEST-SPECIMEN IN LAB: SIGNIFICANT CHANGE UP
ANION GAP SERPL CALC-SCNC: 11 MMOL/L — SIGNIFICANT CHANGE UP (ref 5–17)
BUN SERPL-MCNC: 60 MG/DL — HIGH (ref 7–23)
CALCIUM SERPL-MCNC: 8.4 MG/DL — SIGNIFICANT CHANGE UP (ref 8.4–10.5)
CHLORIDE SERPL-SCNC: 101 MMOL/L — SIGNIFICANT CHANGE UP (ref 96–108)
CK SERPL-CCNC: 831 U/L — HIGH (ref 30–200)
CO2 SERPL-SCNC: 28 MMOL/L — SIGNIFICANT CHANGE UP (ref 22–31)
CREAT SERPL-MCNC: 1.37 MG/DL — HIGH (ref 0.5–1.3)
EGFR: 57 ML/MIN/1.73M2 — LOW
GLUCOSE BLDC GLUCOMTR-MCNC: 239 MG/DL — HIGH (ref 70–99)
GLUCOSE BLDC GLUCOMTR-MCNC: 330 MG/DL — HIGH (ref 70–99)
GLUCOSE BLDC GLUCOMTR-MCNC: 330 MG/DL — HIGH (ref 70–99)
GLUCOSE BLDC GLUCOMTR-MCNC: 346 MG/DL — HIGH (ref 70–99)
GLUCOSE BLDC GLUCOMTR-MCNC: 460 MG/DL — CRITICAL HIGH (ref 70–99)
GLUCOSE BLDC GLUCOMTR-MCNC: 474 MG/DL — CRITICAL HIGH (ref 70–99)
GLUCOSE BLDC GLUCOMTR-MCNC: 91 MG/DL — SIGNIFICANT CHANGE UP (ref 70–99)
GLUCOSE SERPL-MCNC: 53 MG/DL — CRITICAL LOW (ref 70–99)
HCT VFR BLD CALC: 27.3 % — LOW (ref 39–50)
HCT VFR BLD CALC: 27.5 % — LOW (ref 39–50)
HGB BLD-MCNC: 8.8 G/DL — LOW (ref 13–17)
HGB BLD-MCNC: 8.8 G/DL — LOW (ref 13–17)
MAGNESIUM SERPL-MCNC: 2 MG/DL — SIGNIFICANT CHANGE UP (ref 1.6–2.6)
MCHC RBC-ENTMCNC: 28.6 PG — SIGNIFICANT CHANGE UP (ref 27–34)
MCHC RBC-ENTMCNC: 28.9 PG — SIGNIFICANT CHANGE UP (ref 27–34)
MCHC RBC-ENTMCNC: 32 GM/DL — SIGNIFICANT CHANGE UP (ref 32–36)
MCHC RBC-ENTMCNC: 32.2 GM/DL — SIGNIFICANT CHANGE UP (ref 32–36)
MCV RBC AUTO: 89.3 FL — SIGNIFICANT CHANGE UP (ref 80–100)
MCV RBC AUTO: 89.8 FL — SIGNIFICANT CHANGE UP (ref 80–100)
NRBC # BLD: 0 /100 WBCS — SIGNIFICANT CHANGE UP (ref 0–0)
NRBC # BLD: 0 /100 WBCS — SIGNIFICANT CHANGE UP (ref 0–0)
PLATELET # BLD AUTO: 355 K/UL — SIGNIFICANT CHANGE UP (ref 150–400)
PLATELET # BLD AUTO: 387 K/UL — SIGNIFICANT CHANGE UP (ref 150–400)
POTASSIUM SERPL-MCNC: 3.3 MMOL/L — LOW (ref 3.5–5.3)
POTASSIUM SERPL-SCNC: 3.3 MMOL/L — LOW (ref 3.5–5.3)
RBC # BLD: 3.04 M/UL — LOW (ref 4.2–5.8)
RBC # BLD: 3.08 M/UL — LOW (ref 4.2–5.8)
RBC # FLD: 18.1 % — HIGH (ref 10.3–14.5)
RBC # FLD: 18.4 % — HIGH (ref 10.3–14.5)
SODIUM SERPL-SCNC: 140 MMOL/L — SIGNIFICANT CHANGE UP (ref 135–145)
WBC # BLD: 20.36 K/UL — HIGH (ref 3.8–10.5)
WBC # BLD: 23.87 K/UL — HIGH (ref 3.8–10.5)
WBC # FLD AUTO: 20.36 K/UL — HIGH (ref 3.8–10.5)
WBC # FLD AUTO: 23.87 K/UL — HIGH (ref 3.8–10.5)

## 2024-05-09 PROCEDURE — 99232 SBSQ HOSP IP/OBS MODERATE 35: CPT | Mod: GC

## 2024-05-09 RX ORDER — INSULIN LISPRO 100/ML
7 VIAL (ML) SUBCUTANEOUS
Refills: 0 | Status: DISCONTINUED | OUTPATIENT
Start: 2024-05-09 | End: 2024-05-10

## 2024-05-09 RX ORDER — POTASSIUM CHLORIDE 20 MEQ
40 PACKET (EA) ORAL ONCE
Refills: 0 | Status: COMPLETED | OUTPATIENT
Start: 2024-05-09 | End: 2024-05-09

## 2024-05-09 RX ORDER — INSULIN GLARGINE 100 [IU]/ML
17 INJECTION, SOLUTION SUBCUTANEOUS AT BEDTIME
Refills: 0 | Status: DISCONTINUED | OUTPATIENT
Start: 2024-05-09 | End: 2024-05-10

## 2024-05-09 RX ADMIN — ISOSORBIDE DINITRATE 30 MILLIGRAM(S): 5 TABLET ORAL at 15:41

## 2024-05-09 RX ADMIN — Medication 650 MILLIGRAM(S): at 13:46

## 2024-05-09 RX ADMIN — APIXABAN 2.5 MILLIGRAM(S): 2.5 TABLET, FILM COATED ORAL at 17:29

## 2024-05-09 RX ADMIN — Medication 1 APPLICATION(S): at 12:51

## 2024-05-09 RX ADMIN — Medication 40 MILLIGRAM(S): at 06:35

## 2024-05-09 RX ADMIN — Medication 50 MILLIGRAM(S): at 06:34

## 2024-05-09 RX ADMIN — Medication 4: at 12:46

## 2024-05-09 RX ADMIN — Medication 40 MILLIGRAM(S): at 13:46

## 2024-05-09 RX ADMIN — ISOSORBIDE DINITRATE 30 MILLIGRAM(S): 5 TABLET ORAL at 06:34

## 2024-05-09 RX ADMIN — Medication 40 MILLIEQUIVALENT(S): at 08:41

## 2024-05-09 RX ADMIN — Medication 650 MILLIGRAM(S): at 07:34

## 2024-05-09 RX ADMIN — Medication 2: at 20:48

## 2024-05-09 RX ADMIN — Medication 7 UNIT(S): at 12:47

## 2024-05-09 RX ADMIN — Medication 50 MILLIGRAM(S): at 17:30

## 2024-05-09 RX ADMIN — Medication 32 MILLIGRAM(S): at 06:34

## 2024-05-09 RX ADMIN — CLOPIDOGREL BISULFATE 75 MILLIGRAM(S): 75 TABLET, FILM COATED ORAL at 12:46

## 2024-05-09 RX ADMIN — INSULIN GLARGINE 17 UNIT(S): 100 INJECTION, SOLUTION SUBCUTANEOUS at 22:20

## 2024-05-09 RX ADMIN — Medication 6: at 16:59

## 2024-05-09 RX ADMIN — Medication 40 MILLIEQUIVALENT(S): at 12:50

## 2024-05-09 RX ADMIN — CHLORHEXIDINE GLUCONATE 1 APPLICATION(S): 213 SOLUTION TOPICAL at 12:50

## 2024-05-09 RX ADMIN — APIXABAN 2.5 MILLIGRAM(S): 2.5 TABLET, FILM COATED ORAL at 06:34

## 2024-05-09 RX ADMIN — ISOSORBIDE DINITRATE 30 MILLIGRAM(S): 5 TABLET ORAL at 12:46

## 2024-05-09 RX ADMIN — Medication 650 MILLIGRAM(S): at 23:57

## 2024-05-09 RX ADMIN — Medication 7 UNIT(S): at 17:00

## 2024-05-09 NOTE — PROGRESS NOTE ADULT - SUBJECTIVE AND OBJECTIVE BOX
Overnight events noted      VITAL:  T(C): , Max: 36.8 (05-08-24 @ 20:55)  T(F): , Max: 98.3 (05-08-24 @ 20:55)  HR: 88 (05-09-24 @ 13:48)  BP: 106/63 (05-09-24 @ 13:48)  BP(mean): --  RR: 18 (05-09-24 @ 11:33)  SpO2: 98% (05-09-24 @ 11:33)  Wt(kg): --      PHYSICAL EXAM:  Constitutional: alert, NAD  HEENT: NCAT, MMM  Neck: Supple, No JVD  Respiratory: CTA-b/l  Cardiovascular: RRR s1s2, no m/r/g  Gastrointestinal: BS+, soft, NT/ND  Extremities: (+)b/l LE edema/mild tenderness  Neurological: no focal deficits; strength grossly intact  Back: no CVAT b/l  Skin: No rashes, no nevi    LABS:                        8.8    23.87 )-----------( 387      ( 09 May 2024 09:42 )             27.5     Na(140)/K(3.3)/Cl(101)/HCO3(28)/BUN(60)/Cr(1.37)Glu(53)/Ca(8.4)/Mg(2.0)/PO4(--)    05-09 @ 06:48  Na(139)/K(4.4)/Cl(100)/HCO3(24)/BUN(56)/Cr(1.25)Glu(197)/Ca(8.8)/Mg(--)/PO4(--)    05-07 @ 12:18          IMPRESSION: 69M w/ HTN, DM2, CAD, AFib, HFrEF, and CKD, s/p recent PCI, 4/25/24 returned with acute rhabdomyolysis    (1)Renal - CKD3b - due to diabetes/hypertension - at/near baseline  (2)Rhabdo - improving, off IVF  (3)Hypokalemia - whole body deficit, at least in part from diuresis with Lasix - being repleted today  (4)Rheum - 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 - Surgery and Rheum on board- s/p muscle biopsy 5/2, results pending      RECOMMEND:  (1)PO KCL as ordered  (2)PO Lasix as ordered  (3)F/U muscle biopsy results        Markell Walton MD  [facility]  Office/on call physician: [phone]  Cell (7a-7p): [phone]       no complaints      VITAL:  T(C): , Max: 36.8 (05-08-24 @ 20:55)  T(F): , Max: 98.3 (05-08-24 @ 20:55)  HR: 88 (05-09-24 @ 13:48)  BP: 106/63 (05-09-24 @ 13:48)  BP(mean): --  RR: 18 (05-09-24 @ 11:33)  SpO2: 98% (05-09-24 @ 11:33)  Wt(kg): --      PHYSICAL EXAM:  Constitutional: alert, NAD  HEENT: NCAT, MMM  Neck: Supple, No JVD  Respiratory: CTA-b/l  Cardiovascular: RRR s1s2, no m/r/g  Gastrointestinal: BS+, soft, NT/ND  Extremities: (+)b/l LE edema/mild tenderness  Neurological: no focal deficits; strength grossly intact  Back: no CVAT b/l  Skin: No rashes, no nevi    LABS:                        8.8    23.87 )-----------( 387      ( 09 May 2024 09:42 )             27.5     Na(140)/K(3.3)/Cl(101)/HCO3(28)/BUN(60)/Cr(1.37)Glu(53)/Ca(8.4)/Mg(2.0)/PO4(--)    05-09 @ 06:48  Na(139)/K(4.4)/Cl(100)/HCO3(24)/BUN(56)/Cr(1.25)Glu(197)/Ca(8.8)/Mg(--)/PO4(--)    05-07 @ 12:18          IMPRESSION: 69M w/ HTN, DM2, CAD, AFib, HFrEF, and CKD, s/p recent PCI, 4/25/24 returned with acute rhabdomyolysis    (1)Renal - CKD3b - due to diabetes/hypertension - at/near baseline  (2)Rhabdo - improving, off IVF  (3)Hypokalemia - whole body deficit, at least in part from diuresis with Lasix - being repleted today  (4)Rheum - 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 - Surgery and Rheum on board- s/p muscle biopsy 5/2, results pending      RECOMMEND:  (1)PO KCL as ordered  (2)PO Lasix as ordered  (3)F/U muscle biopsy results        Markell Walton MD  [facility]  Office/on call physician: [phone]  Cell (7a-7p): [phone]

## 2024-05-09 NOTE — PROGRESS NOTE ADULT - SUBJECTIVE AND OBJECTIVE BOX
KRISTY COYLE  [id number]    INTERVAL HPI/OVERNIGHT EVENTS: Pt says he is feeling improved strength. Still has R leg swelling 2/2 hematoma from biopsy site. L leg swelling improved.     MEDICATIONS  (STANDING):  apixaban 2.5 milliGRAM(s) Oral every 12 hours  chlorhexidine 2% Cloths 1 Application(s) Topical daily  clopidogrel Tablet 75 milliGRAM(s) Oral daily  dextrose 10% Bolus 125 milliLiter(s) IV Bolus once  dextrose 5%. 1000 milliLiter(s) (100 mL/Hr) IV Continuous <Continuous>  dextrose 5%. 1000 milliLiter(s) (50 mL/Hr) IV Continuous <Continuous>  dextrose 50% Injectable 12.5 Gram(s) IV Push once  dextrose 50% Injectable 25 Gram(s) IV Push once  furosemide    Tablet 40 milliGRAM(s) Oral two times a day  glucagon  Injectable 1 milliGRAM(s) IntraMuscular once  influenza  Vaccine (HIGH DOSE) 0.7 milliLiter(s) IntraMuscular once  insulin glargine Injectable (LANTUS) 17 Unit(s) SubCutaneous at bedtime  insulin lispro (ADMELOG) corrective regimen sliding scale   SubCutaneous at bedtime  insulin lispro (ADMELOG) corrective regimen sliding scale   SubCutaneous three times a day before meals  insulin lispro Injectable (ADMELOG) 7 Unit(s) SubCutaneous three times a day before meals  isosorbide   dinitrate Tablet (ISORDIL) 30 milliGRAM(s) Oral three times a day  methylPREDNISolone 32 milliGRAM(s) Oral daily  metoprolol tartrate 50 milliGRAM(s) Oral two times a day  polyethylene glycol 3350 17 Gram(s) Oral two times a day  senna 2 Tablet(s) Oral at bedtime  silver sulfADIAZINE 1% Cream 1 Application(s) Topical daily    MEDICATIONS  (PRN):  acetaminophen     Tablet .. 650 milliGRAM(s) Oral every 6 hours PRN Temp greater or equal to 38C (100.4F), Mild Pain (1 - 3)  bisacodyl 5 milliGRAM(s) Oral every 12 hours PRN Constipation  dextrose Oral Gel 15 Gram(s) Oral once PRN Blood Glucose LESS THAN 70 milliGRAM(s)/deciliter      Allergies    atorvastatin (Muscle Pain (Severe))    Intolerances        Review of Systems: as above    Vital Signs Last 24 Hrs  T(C): 36.7 (09 May 2024 11:33), Max: 36.8 (08 May 2024 20:55)  T(F): 98 (09 May 2024 11:33), Max: 98.3 (08 May 2024 20:55)  HR: 88 (09 May 2024 13:48) (71 - 88)  BP: 106/63 (09 May 2024 13:48) (106/63 - 120/66)  BP(mean): --  RR: 18 (09 May 2024 11:33) (18 - 18)  SpO2: 98% (09 May 2024 11:33) (97% - 98%)    Parameters below as of 09 May 2024 11:33  Patient On (Oxygen Delivery Method): room air      Physical Exam:  General: Breathing comfortably on room air, no distress  HEENT: EOMI  CVS: +S1/S2  Resp: CTA b/l  GI: Soft, NT/ND +BS  MSK: no synovitis. Able to stand from sitting to standing position on own. 5/5 strength in b/l shoulders, 5/5 distals arms/hands, 3/5 b/l thighs, 5/5 distal legs/feet  Skin: no visible rashes. R>L Pitting leg/ankle/foot edema w/ eccymosis in R thigh    LABS:                        8.8    23.87 )-----------( 387      ( 09 May 2024 09:42 )             27.5     05-09    140  |  101  |  60<H>  ----------------------------<  53<LL>  3.3<L>   |  28  |  1.37<H>    Ca    8.4      09 May 2024 06:48  Mg     2.0     05-09        Urinalysis Basic - ( 09 May 2024 06:48 )    Color: x / Appearance: x / SG: x / pH: x  Gluc: 53 mg/dL / Ketone: x  / Bili: x / Urobili: x   Blood: x / Protein: x / Nitrite: x   Leuk Esterase: x / RBC: x / WBC x   Sq Epi: x / Non Sq Epi: x / Bacteria: x    Creatine Kinase, Serum in AM (05.09.24 @ 06:48)   Creatine Kinase, Serum: 831 U/L      Historical Values  Creatine Kinase, Serum in AM (05.09.24 @ 06:48)   Creatine Kinase, Serum: 831 U/L  Creatine Kinase, Serum in AM (05.05.24 @ 05:34)   Creatine Kinase, Serum: 2981 U/L  Creatine Kinase, Serum in AM (05.04.24 @ 06:43)   Creatine Kinase, Serum: 3465 U/L  Creatine Kinase, Serum in AM (05.01.24 @ 08:25)   Creatine Kinase, Serum: 7488 U/L  Creatine Kinase, Serum in AM (04.30.24 @ 06:07)   Creatine Kinase, Serum: 67223 U/L  Creatine Kinase, Serum in AM (04.29.24 @ 06:14)   Creatine Kinase, Serum: 33257 U/L  Creatine Kinase, Serum in AM (04.28.24 @ 06:19)   Creatine Kinase, Serum: 56923 U/L  Creatine Kinase, Serum in AM (04.27.24 @ 07:11)   Creatine Kinase, Serum: 79019 U/L  Creatine Kinase, Serum in AM (04.26.24 @ 06:25)   Creatine Kinase, Serum: 31272 U/L  Creatine Kinase, Serum in AM (09.07.19 @ 07:03)   Creatine Kinase, Serum: 164 U/L    HMGCR Antibody, IgG (04.28.24 @ 17:43)   HMGCR Antibody, IgG result: <3: INTERPRETIVE INFORMATION: HMGCR Antibody, IgG   IgG antibodies to 3-hydroxy-3-methylglutaryl-coenzyme A reductase   (HMGCR) are mainly associated with necrotizing autoimmune myopathy   (NAM) in a subset of statin-treated patients. Although infrequent,   these antibodies may also be observed in statin-naive patients   with NAM. Strong clinical correlation is recommended in the   absence of muscle fiber necrosis, elevated serum creatine kinase,   perimysial pathology, and/or statin exposure.   This test was developed and its performance characteristics   determined by [facility]. It has not been cleared or   approved by the US Food and Drug Administration. This test was   performed in a CLIA certified laboratory and is intended for   clinical purposes.   Performed By: [facility]   [address] [place] [id number]   : Johnnie Ann MD, PhD   CLIA Number: [id number] Units

## 2024-05-09 NOTE — PROGRESS NOTE ADULT - PROBLEM SELECTOR PLAN 1
Will decrease Lantus to 17 units at bed time.  Will decrease Admelog to 7 units before each meal in addition to Admelog correction scale coverage.  Patient counseled for compliance with consistent low carb diet.     Insulins to be adjusted pending steroid taper  DC on home Semglee   Stop home Farxiga altogther and any SGLT2i due to recent BARRY/rhabdo  OP follow up

## 2024-05-09 NOTE — PROGRESS NOTE ADULT - ASSESSMENT
70 yo M PMH HLD, CAD (s/p 10 stents, last 4/2024), Afib on Eliquis, HFrEF, CKD3 2/2 DM/HTN p/w LE pain/cramping found to have CK 28K     #significant proximal muscle weakness (thighs>shoulders) w/ CK 28K initially unresponsive to fluids (5-7L)  #low grade CK elevation 971 (1/29/24), 1318 (2/9/24) reported in the past  #on home lipitor 80mg  #MRI thighs showing nonspecific myositis involving b/l hip girdle/thighs - s/p R thigh bx 5/2/24    #per inflammatory myositis malignancy screening guidelines: Pt is intermediate risk for malignancy (reports normal C-scope 2024)  -CT neck, chest, abdomen, pelvis WNL  -PSA WNL    # QTB indeterminant  likely anergic   CT chest unrevealing  (Hep B, Hep C negative)      Impression: Constellation of symptoms and findings above was initially 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 In addition, the HMGCR Antibody resulted negative, and it is a very sensitive test for inflammatory myositis. Given the information we have now, would have to consider that this may have all been 2/2 rhabdomyolysis 2/2 statin use alone, however will follow up muscle biopsy to rule out an inflammatory myositis w/ necrotizing myopathy.    Steroid course:   Solumedrol 40mg (4/28-5/6)  Medrol 32mg (5/7)    Plan:  -given patients clinical improvement and downtrending CK, as well as hyperglycemia, will favor to taper off medrol  -would c/w following taper: medrol 24mg (for 2 days), then medrol 16mg (for 2 days), medrol 12mg (for 2 days), medrol 8mg (for 2 days), then medrol 4mg (for 2 days), then finished  -can follow up muscle biopsy, myomarker panel, SRP ab as outpatient   -can follow up with Rheumatologist Dr Tasha Pelletier as outpatient within 2 weeks of hospital discharge - please ensure that it is coordinated by the patients rehab and rheumatology office    Tasha Pelletier MD  Rheumatology, Internal Medicine  [phone]  [address] [facility], [address], [place] [address]     Discussed with Attending Dr. Mary Mary, DO  Rheumatology Fellow  Available on TEAMS 70 yo M PMH HLD, CAD (s/p 10 stents, last 4/2024), Afib on Eliquis, HFrEF, CKD3 2/2 DM/HTN p/w LE pain/cramping found to have CK 28K     #significant proximal muscle weakness (thighs>shoulders) w/ CK 28K initially unresponsive to fluids (5-7L)  #low grade CK elevation 971 (1/29/24), 1318 (2/9/24) reported in the past  #on home lipitor 80mg  #MRI thighs showing nonspecific myositis involving b/l hip girdle/thighs - s/p R thigh bx 5/2/24    #per inflammatory myositis malignancy screening guidelines: Pt is intermediate risk for malignancy (reports normal C-scope 2024)  -CT neck, chest, abdomen, pelvis WNL  -PSA WNL    # QTB indeterminant  likely anergic   CT chest unrevealing  (Hep B, Hep C negative)      Impression: Constellation of symptoms and findings above was initially 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 In addition, the HMGCR Antibody resulted negative, and it is a very sensitive test for inflammatory myositis. Given the information we have now, would have to consider that this may have all been 2/2 rhabdomyolysis 2/2 statin use alone, however will follow up muscle biopsy to rule out an inflammatory myositis w/ necrotizing myopathy.    Steroid course:   Solumedrol 40mg (4/28-5/6)  Medrol 32mg (5/7)    Plan:  -given patients clinical improvement and downtrending CK, as well as hyperglycemia and leg swelling, will favor to taper off medrol  -would c/w following taper: medrol 24mg (for 2 days), then medrol 16mg (for 2 days), medrol 12mg (for 2 days), medrol 8mg (for 2 days), then medrol 4mg (for 2 days), then finished  -please monitor CK and muscle strength at rehab  -can follow up muscle biopsy, myomarker panel, SRP ab as outpatient   -can follow up with Rheumatologist Dr Tasha Pelletier as outpatient within 2 weeks of hospital discharge - please ensure that it is coordinated by the patients rehab and rheumatology office    Tasha Pelletier MD  Rheumatology, Internal Medicine  [phone] [address] [facility], [address], [place] [address]     Discussed with Attending Dr. Mary Mary, DO  Rheumatology Fellow  Available on TEAMS 70 yo M PMH HLD, CAD (s/p 10 stents, last 4/2024), Afib on Eliquis, HFrEF, CKD3 2/2 DM/HTN p/w LE pain/cramping found to have CK 28K     #significant proximal muscle weakness (thighs>shoulders) w/ CK 28K initially unresponsive to fluids (5-7L)  #low grade CK elevation 971 (1/29/24), 1318 (2/9/24) reported in the past  #on home lipitor 80mg  #MRI thighs showing nonspecific myositis involving b/l hip girdle/thighs - s/p R thigh bx 5/2/24    #per inflammatory myositis malignancy screening guidelines: Pt is intermediate risk for malignancy (reports normal C-scope 2024)  -CT neck, chest, abdomen, pelvis WNL  -PSA WNL    # QTB indeterminant  likely anergic   CT chest unrevealing  (Hep B, Hep C negative)      Impression: Constellation of symptoms and findings above was initially 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 In addition, the HMGCR Antibody resulted negative, and it is a very sensitive test for inflammatory myositis. Given the information we have now, would have to consider that this may have all been 2/2 rhabdomyolysis 2/2 statin use alone, however will follow up muscle biopsy to rule out an inflammatory myositis w/ necrotizing myopathy.    Steroid course:   Solumedrol 40mg (4/28-5/6)  Medrol 32mg (5/7)    Plan:  -given patients clinical improvement and downtrending CK, as well as hyperglycemia and leg swelling, will favor to taper off medrol  -would c/w following taper: medrol 24mg (for 2 days), then medrol 16mg (for 2 days), medrol 12mg (for 2 days), medrol 8mg (for 2 days), then medrol 4mg (for 2 days), then stop  -please monitor CK and muscle strength at rehab  -can follow up muscle biopsy, myomarker panel, SRP ab as outpatient   -can follow up with Rheumatologist Dr Tasha Pelletier as outpatient within 2 weeks of hospital discharge - please ensure that it is coordinated by the patients rehab and rheumatology office    Tasha Pelletier MD  Rheumatology, Internal Medicine  [phone] [address] [facility], [address], [place] [address]     Discussed with Attending Dr. Mary Mary, DO  Rheumatology Fellow  Available on TEAMS

## 2024-05-09 NOTE — PROGRESS NOTE ADULT - ASSESSMENT
68 y/o man h/o severe CAD S/P multiple PCI's including multiple LM and LAD stents.  +A-fib, HTN, HLD, T2DM, CKD3, former smoker presents with  weakness, leg  pains  - tele AF  BPM couplets PVC brief stevo 47 BPM   -  + Rhabdo much improved  - + edema will require additional diuresis. Concomitant steroid administration likely adding to edema as well   - analgesia for RLE pain  - Cr up to 1.37 careful monitoring required  - Doppler --> Subcutaneous hematoma in the right medial posterior thigh, on a background of subcutaneous edema.  - stable at present from CV standpoint

## 2024-05-09 NOTE — PROGRESS NOTE ADULT - ASSESSMENT
70 y/o male with hx of CAD s/p multiple  stents (most recent to pLM (70%), pLAD, mLAD on 1/8/24 with Dr. Dominguez), HFrEF , A-fib, HTN, HLD, T2DM,  CKD3, former smoker  Assessment  DMT2: 69y Male with DM T2 with hyperglycemia, A1C 7.6% , was on oral meds and insulin at home  (Farxiga and  Semglee basal insulin), now on basal bolus insulins, sugars are starting to trend down. Had low FS  was started on steroids, having steroid induced hyperglycemias  CAD: on medications, stable, monitored.   HTN: on antihypertensive medications, monitored, asymptomatic.  CKD: Monitor labs/BMP, renal follow up. rhabdo, muscle bx        Discussed plan and management with Dr Flo Jamil NP - TEAMS  Myrna Rossi MD  Cell: [phone]  Office: [phone]      70 y/o male with hx of CAD s/p multiple  stents (most recent to pLM (70%), pLAD, mLAD on 1/8/24 with Dr. Dominguez), HFrEF , A-fib, HTN, HLD, T2DM,  CKD3, former smoker  Assessment  DMT2: 69y Male with DM T2 with hyperglycemia, A1C 7.6% , was on oral meds and insulin at home  (Farxiga and  Semglee basal insulin), now on basal bolus insulins,  sugars are starting to trend down. Had low FS  was started on steroids, having steroid induced hyperglycemias  CAD: on medications, stable, monitored.   HTN: on antihypertensive medications, monitored, asymptomatic.  CKD: Monitor labs/BMP, renal follow up. rhabdo, muscle bx        Discussed plan and management with Dr Flo Jamil NP - TEAMS  Myrna Rossi MD  Cell: [phone]  Office: [phone]

## 2024-05-09 NOTE — PROGRESS NOTE ADULT - SUBJECTIVE AND OBJECTIVE BOX
Date of service: 24 @ 10:36      Patient is a 69y old  Male who presents with a chief complaint of Rhabdo (09 May 2024 07:51)                                                               INTERVAL HPI/OVERNIGHT EVENTS:    REVIEW OF SYSTEMS:     CONSTITUTIONAL: No weakness, fevers or chills  EYES/ENT: No visual changes , no ear ache   NECK: No pain or stiffness  RESPIRATORY: No cough, wheezing,  No shortness of breath  CARDIOVASCULAR: No chest pain or palpitations  GASTROINTESTINAL: No abdominal pain  . No nausea, vomiting, or hematemesis; No diarrhea or constipation. No melena or hematochezia.  GENITOURINARY: No dysuria, frequency or hematuria  NEUROLOGICAL: No numbness or weakness  SKIN: No itching, burning, rashes, or lesions                                                                                                                                                                                                                                                                                 Medications:  MEDICATIONS  (STANDING):  apixaban 2.5 milliGRAM(s) Oral every 12 hours  chlorhexidine 2% Cloths 1 Application(s) Topical daily  clopidogrel Tablet 75 milliGRAM(s) Oral daily  dextrose 10% Bolus 125 milliLiter(s) IV Bolus once  dextrose 5%. 1000 milliLiter(s) (100 mL/Hr) IV Continuous <Continuous>  dextrose 5%. 1000 milliLiter(s) (50 mL/Hr) IV Continuous <Continuous>  dextrose 50% Injectable 25 Gram(s) IV Push once  dextrose 50% Injectable 12.5 Gram(s) IV Push once  furosemide    Tablet 40 milliGRAM(s) Oral two times a day  glucagon  Injectable 1 milliGRAM(s) IntraMuscular once  influenza  Vaccine (HIGH DOSE) 0.7 milliLiter(s) IntraMuscular once  insulin glargine Injectable (LANTUS) 17 Unit(s) SubCutaneous at bedtime  insulin lispro (ADMELOG) corrective regimen sliding scale   SubCutaneous at bedtime  insulin lispro (ADMELOG) corrective regimen sliding scale   SubCutaneous three times a day before meals  insulin lispro Injectable (ADMELOG) 7 Unit(s) SubCutaneous three times a day before meals  isosorbide   dinitrate Tablet (ISORDIL) 30 milliGRAM(s) Oral three times a day  methylPREDNISolone 32 milliGRAM(s) Oral daily  metoprolol tartrate 50 milliGRAM(s) Oral two times a day  polyethylene glycol 3350 17 Gram(s) Oral two times a day  potassium chloride    Tablet ER 40 milliEquivalent(s) Oral once  senna 2 Tablet(s) Oral at bedtime  silver sulfADIAZINE 1% Cream 1 Application(s) Topical daily    MEDICATIONS  (PRN):  acetaminophen     Tablet .. 650 milliGRAM(s) Oral every 6 hours PRN Temp greater or equal to 38C (100.4F), Mild Pain (1 - 3)  bisacodyl 5 milliGRAM(s) Oral every 12 hours PRN Constipation  dextrose Oral Gel 15 Gram(s) Oral once PRN Blood Glucose LESS THAN 70 milliGRAM(s)/deciliter       Allergies    atorvastatin (Muscle Pain (Severe))    Intolerances      Vital Signs Last 24 Hrs  T(C): 36.6 (09 May 2024 04:16), Max: 36.8 (08 May 2024 20:55)  T(F): 97.9 (09 May 2024 04:16), Max: 98.3 (08 May 2024 20:55)  HR: 71 (09 May 2024 04:16) (71 - 84)  BP: 115/64 (09 May 2024 04:16) (109/73 - 124/70)  BP(mean): --  RR: 18 (09 May 2024 04:16) (18 - 18)  SpO2: 97% (09 May 2024 04:16) (97% - 98%)    Parameters below as of 09 May 2024 04:16  Patient On (Oxygen Delivery Method): room air      CAPILLARY BLOOD GLUCOSE      POCT Blood Glucose.: 91 mg/dL (09 May 2024 07:58)  POCT Blood Glucose.: 141 mg/dL (08 May 2024 21:42)  POCT Blood Glucose.: 87 mg/dL (08 May 2024 16:39)  POCT Blood Glucose.: 96 mg/dL (08 May 2024 11:51)       @ 07:  -   @ 07:00  --------------------------------------------------------  IN: 240 mL / OUT: 1250 mL / NET: -1010 mL     @ 07:  -   @ 10:36  --------------------------------------------------------  IN: 0 mL / OUT: 575 mL / NET: -575 mL      Physical Exam:    Daily     Daily Weight in k (09 May 2024 09:06)  General:  Well appearing, NAD, not cachetic  HEENT:  Nonicteric, PERRLA  CV:  RRR, S1S2   Lungs:  CTA B/L, no wheezes, rales, rhonchi  Abdomen:  Soft, non-tender, no distended, positive BS  Extremities:  2+ pulses, no c/c, no edema  Skin:  Warm and dry, no rashes  :  No moreno  Neuro:  AAOx3, non-focal, grossly intact                                                                                                                                                                                                                                                                                                LABS:                               8.8    23.87 )-----------( 387      ( 09 May 2024 09:42 )             27.5                      05    140  |  101  |  60<H>  ----------------------------<  53<LL>  3.3<L>   |  28  |  1.37<H>    Ca    8.4      09 May 2024 06:48  Mg     2.0                              RADIOLOGY & ADDITIONAL TESTS         I personally reviewed: [  ]EKG   [  ]CXR    [  ] CT      A/P:         Discussed with :     Derek consultants' Notes   Time spent :

## 2024-05-09 NOTE — PROGRESS NOTE ADULT - SUBJECTIVE AND OBJECTIVE BOX
Chief complaint  Patient is a 69y old  Male who presents with a chief complaint of Rhabdo (09 May 2024 14:38)         Labs and Fingersticks  CAPILLARY BLOOD GLUCOSE      POCT Blood Glucose.: 330 mg/dL (09 May 2024 12:45)  POCT Blood Glucose.: 91 mg/dL (09 May 2024 07:58)  POCT Blood Glucose.: 141 mg/dL (08 May 2024 21:42)  POCT Blood Glucose.: 87 mg/dL (08 May 2024 16:39)      Anion Gap: 11 (05-09 @ 06:48)      Calcium: 8.4 (05-09 @ 06:48)          05-09    140  |  101  |  60<H>  ----------------------------<  53<LL>  3.3<L>   |  28  |  1.37<H>    Ca    8.4      09 May 2024 06:48  Mg     2.0     05-09                          8.8    23.87 )-----------( 387      ( 09 May 2024 09:42 )             27.5     Medications  MEDICATIONS  (STANDING):  apixaban 2.5 milliGRAM(s) Oral every 12 hours  chlorhexidine 2% Cloths 1 Application(s) Topical daily  clopidogrel Tablet 75 milliGRAM(s) Oral daily  dextrose 10% Bolus 125 milliLiter(s) IV Bolus once  dextrose 5%. 1000 milliLiter(s) (100 mL/Hr) IV Continuous <Continuous>  dextrose 5%. 1000 milliLiter(s) (50 mL/Hr) IV Continuous <Continuous>  dextrose 50% Injectable 12.5 Gram(s) IV Push once  dextrose 50% Injectable 25 Gram(s) IV Push once  furosemide    Tablet 40 milliGRAM(s) Oral two times a day  glucagon  Injectable 1 milliGRAM(s) IntraMuscular once  influenza  Vaccine (HIGH DOSE) 0.7 milliLiter(s) IntraMuscular once  insulin glargine Injectable (LANTUS) 17 Unit(s) SubCutaneous at bedtime  insulin lispro (ADMELOG) corrective regimen sliding scale   SubCutaneous three times a day before meals  insulin lispro (ADMELOG) corrective regimen sliding scale   SubCutaneous at bedtime  insulin lispro Injectable (ADMELOG) 7 Unit(s) SubCutaneous three times a day before meals  isosorbide   dinitrate Tablet (ISORDIL) 30 milliGRAM(s) Oral three times a day  methylPREDNISolone 32 milliGRAM(s) Oral daily  metoprolol tartrate 50 milliGRAM(s) Oral two times a day  polyethylene glycol 3350 17 Gram(s) Oral two times a day  senna 2 Tablet(s) Oral at bedtime  silver sulfADIAZINE 1% Cream 1 Application(s) Topical daily      Physical Exam  General: Patient comfortable in bed   Vital Signs Last 12 Hrs  T(F): 98 (05-09-24 @ 11:33), Max: 98 (05-09-24 @ 11:33)  HR: 88 (05-09-24 @ 13:48) (71 - 88)  BP: 106/63 (05-09-24 @ 13:48) (106/63 - 120/66)  BP(mean): --  RR: 18 (05-09-24 @ 11:33) (18 - 18)  SpO2: 98% (05-09-24 @ 11:33) (97% - 98%)    CVS: S1S2   Respiratory: No wheezing, no crepitations  GI: Abdomen soft, bowel sounds positive  Musculoskeletal:  moves all extremities           Chief complaint  Patient is a 69y old  Male who presents with a chief complaint of Rhabdo (09 May 2024 14:38)       Labs and Fingersticks  CAPILLARY BLOOD GLUCOSE      POCT Blood Glucose.: 330 mg/dL (09 May 2024 12:45)  POCT Blood Glucose.: 91 mg/dL (09 May 2024 07:58)  POCT Blood Glucose.: 141 mg/dL (08 May 2024 21:42)  POCT Blood Glucose.: 87 mg/dL (08 May 2024 16:39)      Anion Gap: 11 (05-09 @ 06:48)      Calcium: 8.4 (05-09 @ 06:48)          05-09    140  |  101  |  60<H>  ----------------------------<  53<LL>  3.3<L>   |  28  |  1.37<H>    Ca    8.4      09 May 2024 06:48  Mg     2.0     05-09                          8.8    23.87 )-----------( 387      ( 09 May 2024 09:42 )             27.5     Medications  MEDICATIONS  (STANDING):  apixaban 2.5 milliGRAM(s) Oral every 12 hours  chlorhexidine 2% Cloths 1 Application(s) Topical daily  clopidogrel Tablet 75 milliGRAM(s) Oral daily  dextrose 10% Bolus 125 milliLiter(s) IV Bolus once  dextrose 5%. 1000 milliLiter(s) (100 mL/Hr) IV Continuous <Continuous>  dextrose 5%. 1000 milliLiter(s) (50 mL/Hr) IV Continuous <Continuous>  dextrose 50% Injectable 12.5 Gram(s) IV Push once  dextrose 50% Injectable 25 Gram(s) IV Push once  furosemide    Tablet 40 milliGRAM(s) Oral two times a day  glucagon  Injectable 1 milliGRAM(s) IntraMuscular once  influenza  Vaccine (HIGH DOSE) 0.7 milliLiter(s) IntraMuscular once  insulin glargine Injectable (LANTUS) 17 Unit(s) SubCutaneous at bedtime  insulin lispro (ADMELOG) corrective regimen sliding scale   SubCutaneous three times a day before meals  insulin lispro (ADMELOG) corrective regimen sliding scale   SubCutaneous at bedtime  insulin lispro Injectable (ADMELOG) 7 Unit(s) SubCutaneous three times a day before meals  isosorbide   dinitrate Tablet (ISORDIL) 30 milliGRAM(s) Oral three times a day  methylPREDNISolone 32 milliGRAM(s) Oral daily  metoprolol tartrate 50 milliGRAM(s) Oral two times a day  polyethylene glycol 3350 17 Gram(s) Oral two times a day  senna 2 Tablet(s) Oral at bedtime  silver sulfADIAZINE 1% Cream 1 Application(s) Topical daily      Physical Exam  General: Patient comfortable in bed   Vital Signs Last 12 Hrs  T(F): 98 (05-09-24 @ 11:33), Max: 98 (05-09-24 @ 11:33)  HR: 88 (05-09-24 @ 13:48) (71 - 88)  BP: 106/63 (05-09-24 @ 13:48) (106/63 - 120/66)  BP(mean): --  RR: 18 (05-09-24 @ 11:33) (18 - 18)  SpO2: 98% (05-09-24 @ 11:33) (97% - 98%)    CVS: S1S2   Respiratory: No wheezing, no crepitations  GI: Abdomen soft, bowel sounds positive  Musculoskeletal:  moves all extremities

## 2024-05-09 NOTE — PROGRESS NOTE ADULT - ASSESSMENT
68 y/o man      h/o severe CAD S/P multiple PCI's including multiple LM and LAD stents.       +A-fib, HTN, HLD, T2DM, CKD3, former smoker     presents with  weaknss/   leg  pains.  no leg  weakness      + troponin /  elevated  cpk      CAD,  has   10  stents/  HTN/  HLD        cath,   4/18,  2.0 mm caliber TITO to distal LAD,Dr ILDA Bernal        on  dapt/  ef   47     troponin,  likely   demand  ischemia.  not  mi  per  dr arellano,,  not a  candidate   for  further intervention,   small  caliber  vessels   c/c  Afib,  on eliquis         elevated  CPK,    suspect  statin induced.  rhabdomyolysis/  form drug vs  polymyositis,    need  to  r/o  polymyositis, vs  drug induced   myositis,    mri  pelvis,  myositis/  fx  femoral condyle.  ortho  eval  noted,  rec immobilizer  pt refusing    ct a/p, neck, no  malignancy   await   bx   by   surg DR. palomino/  on iv  steroid  per   rheum   cont off statin   fu BX   follow  path  monitor  MACARENA   cont steroids ..   rheuym input noted   change to medrol   fu bx       afib restarted eliquis     renal failure : monitor       elevated lfts,   abd  u/s,  normal liver       PAD/  will  f/p  a s an out pt with vascular. when stable   on asa/  eliquis, plavix, / lasix, imdur.  valsartan.  toprol      hold   Farxiga   and  valsartan for  now,  iv fluids,  per  renal   c/c  anemia            CAD : stent/on  4/17/24.   only   option , to   temporarily hold  eliquis  and   continue   plavix,   and   card  dr bravo and  dr brijesh bernal in  agreement    wbc  noted, from steroid,  cpk/  lfts decreasing     edema : dc ivf   increase lasix .. will give one extra dose today   and change to po upon dc       Electrolyte abn : replete potassium aaggressively and repeat K and mag   rehab   discussed at length with acp and pt  and with CM   dc planning   filled out forms for pt's work                   -

## 2024-05-09 NOTE — PROGRESS NOTE ADULT - SUBJECTIVE AND OBJECTIVE BOX
Problem: At Risk for Falls  Goal: # Patient does not fall  Outcome: Outcome Met, Continue evaluating goal progress toward completion  Goal: # Takes action to control fall-related risks  Outcome: Outcome Met, Continue evaluating goal progress toward completion  Goal: # Verbalizes understanding of fall risk/precautions  Description: Document education using the patient education activity  Outcome: Outcome Met, Continue evaluating goal progress toward completion     Problem: At Risk for Injury Due to Fall  Goal: # Patient does not fall  Outcome: Outcome Met, Continue evaluating goal progress toward completion  Goal: # Takes action to control condition specific risks  Outcome: Outcome Met, Continue evaluating goal progress toward completion  Goal: # Verbalizes understanding of fall-related injury personal risks  Description: Document education using the patient education activity  Outcome: Outcome Met, Continue evaluating goal progress toward completion     Problem: Neurovascular Surgery/Procedure  Goal: Neurological status is maintained/retuned to baseline  Description: Monitor for changes in neurological status including deficits in movement/strength/tone or sensation in the head/face/neck (e.g. facial asymmetry/droop and tongue deviation to weak side), speech or swallow deficits including loss of gag reflex, change in vocal quality or inability to handle oral  secretions (even if NPO status)  Outcome: Outcome Met, Continue evaluating goal progress toward completion  Goal: Vital signs are maintained within parameters  Description: Monitor for changes in vital signs including hypertension or symptoms associated with vagal nerve stimulation (e.g. hypotension or bradycardia).  Outcome: Outcome Met, Continue evaluating goal progress toward completion  Goal: Elimination status is maintained/returned to baseline  Description: Remove indwelling urinary catheter as soon as possible or collaborate with provider for  order/reason for continued use.   Outcome: Outcome Met, Continue evaluating goal progress toward completion  Goal: Oral intake is resumed and tolerated  Outcome: Outcome Met, Continue evaluating goal progress toward completion  Goal: Activity level is resumed to level needed for d/c  Outcome: Outcome Met, Continue evaluating goal progress toward completion  Goal: Personal stroke risk factors are identified with initial plan for risk reduction  Description: Stroke risk reduction involves taking medication, changing diet, increasing physical exercise, smoking cessation, or alcohol/drug use reduction/cessation based on identified risks.  Outcome: Outcome Met, Continue evaluating goal progress toward completion  Goal: Verbalizes/demonstrates understanding of treatment plan, hospital care, stroke risk reduction and discharge instructions  Description: Documented education using the patient education activity  Outcome: Outcome Met, Continue evaluating goal progress toward completion     Problem: Pressure Injury, Risk for  Goal: # Skin remains intact  Outcome: Outcome Met, Continue evaluating goal progress toward completion  Goal: No new pressure injury (PI) development  Outcome: Outcome Met, Continue evaluating goal progress toward completion  Goal: # Verbalizes understanding of PI risk factors and prevention strategies  Description: Document education using the patient education activity.   Outcome: Outcome Met, Continue evaluating goal progress toward completion  Goal: Comfort optimized with pressure injury prevention strategies guided by patient/family preference. (Hospice)  Outcome: Outcome Met, Continue evaluating goal progress toward completion     Problem: Stroke: Ischemic (Transient/Permanent)  Goal: Elimination status is maintained/returned to baseline  Description: Remove indwelling urinary catheter as soon as possible or collaborate with provider for order/reason for continued use.   Outcome: Outcome Met,  Continue evaluating goal progress toward completion  Goal: Neurological status is maintained/restored to status at baseline  Description: Monitor neurological and mental status including symptoms of increasing intracranial pressure (headache, nausea/vomiting, or change in behavior). Hypertension (greater than 180 systolic) may also indicate a change in status related to stroke.  Outcome: Outcome Met, Continue evaluating goal progress toward completion  Goal: Normal temperature is maintained  Outcome: Outcome Met, Continue evaluating goal progress toward completion  Goal: #Depressive s/s (self-reported/observed) are recognized and monitored  Outcome: Outcome Met, Continue evaluating goal progress toward completion  Goal: Personal stroke risk factors are identified with initial plan for risk reduction  Description: Stroke risk reduction involves taking medication, changing diet, increasing physical exercise, smoking cessation, or alcohol/drug use reduction/cessation based on identified risks.  Outcome: Outcome Met, Continue evaluating goal progress toward completion  Goal: Verbalizes understanding of condition and treatment plan  Description: Document on Patient Education Activity  Outcome: Outcome Met, Continue evaluating goal progress toward completion     Problem: Pain  Goal: #Acceptable pain level achieved/maintained at rest using NRS/Faces  Description: This goal is used for patients who can self-report.  Acceptable means the level is at or below the identified comfort/function goal.  Outcome: Outcome Met, Continue evaluating goal progress toward completion  Goal: # Acceptable pain level achieved/maintained with activity using NRS/Faces  Description: This goal is used for patients who can self-report and are not achieving acceptable pain control during activity.  Outcome: Outcome Met, Continue evaluating goal progress toward completion  Goal: Acceptable pain/comfort level is achieved/maintained at rest (based on  Pain Behaviors Scale)  Description: This goal is used for patients who are not able to self-report pain and are assessed for pain using the Pain Behaviors Scale  Outcome: Outcome Met, Continue evaluating goal progress toward completion  Goal: Acceptable pain/comfort level is achieved/maintained at rest based on PAINAID scale (Dementia)  Description: This goal is used for patients who are not able to self-report pain, have dementia, and assessed using the PAINAD scale.  Outcome: Outcome Met, Continue evaluating goal progress toward completion  Goal: Acceptable pain/comfort level is achieved/maintained at rest (based on pediatric behavior tool: NIPS, NPASS, or FLACC)  Description: This goal is used for pediatric patients who are not able to self report pain.  Outcome: Outcome Met, Continue evaluating goal progress toward completion  Goal: # Verbalizes understanding of pain management  Description: Documented in Patient Education Activity  Outcome: Outcome Met, Continue evaluating goal progress toward completion     Problem: Dysphagia  Goal: # Exhibits no s/s of aspiration  Description: Symptoms of aspiration include coughing, choking, throat clearing, change in voice quality, and/or a decrease in oxygen saturation by more than 2% points from baseline after swallowing.  Outcome: Outcome Met, Continue evaluating goal progress toward completion  Goal: # Verbalizes understanding of dysphagia management  Description: Document education using the patient education activity.  Outcome: Outcome Met, Continue evaluating goal progress toward completion      SUBJECTIVE:  NO CP no SOB R leg hurts at the biopsy site    MEDICATIONS:  furosemide    Tablet 40 milliGRAM(s) Oral two times a day  isosorbide   dinitrate Tablet (ISORDIL) 30 milliGRAM(s) Oral three times a day  metoprolol tartrate 50 milliGRAM(s) Oral two times a day        acetaminophen     Tablet .. 650 milliGRAM(s) Oral every 6 hours PRN    bisacodyl 5 milliGRAM(s) Oral every 12 hours PRN  polyethylene glycol 3350 17 Gram(s) Oral two times a day  senna 2 Tablet(s) Oral at bedtime    dextrose 50% Injectable 12.5 Gram(s) IV Push once  dextrose 50% Injectable 25 Gram(s) IV Push once  dextrose Oral Gel 15 Gram(s) Oral once PRN  glucagon  Injectable 1 milliGRAM(s) IntraMuscular once  insulin glargine Injectable (LANTUS) 24 Unit(s) SubCutaneous at bedtime  insulin lispro (ADMELOG) corrective regimen sliding scale   SubCutaneous at bedtime  insulin lispro (ADMELOG) corrective regimen sliding scale   SubCutaneous three times a day before meals  insulin lispro Injectable (ADMELOG) 9 Unit(s) SubCutaneous three times a day before meals  methylPREDNISolone 32 milliGRAM(s) Oral daily    apixaban 2.5 milliGRAM(s) Oral every 12 hours  chlorhexidine 2% Cloths 1 Application(s) Topical daily  clopidogrel Tablet 75 milliGRAM(s) Oral daily  dextrose 10% Bolus 125 milliLiter(s) IV Bolus once  dextrose 5%. 1000 milliLiter(s) IV Continuous <Continuous>  dextrose 5%. 1000 milliLiter(s) IV Continuous <Continuous>  influenza  Vaccine (HIGH DOSE) 0.7 milliLiter(s) IntraMuscular once  potassium chloride    Tablet ER 40 milliEquivalent(s) Oral once  silver sulfADIAZINE 1% Cream 1 Application(s) Topical daily      REVIEW OF SYSTEMS:    CONSTITUTIONAL: No fever, weight loss, or fatigue  EYES: No eye pain, visual disturbances, or discharge  NECK: No pain or stiffness  RESPIRATORY: No cough, wheezing, chills or hemoptysis; No Shortness of Breath  CARDIOVASCULAR: No chest pain, palpitations, dizziness, or leg swelling  GASTROINTESTINAL: No abdominal or epigastric pain. No nausea, vomiting, or hematemesis; No diarrhea or constipation. No melena or hematochezia.  GENITOURINARY: No dysuria, frequency, hematuria, or incontinence  NEUROLOGICAL: No headaches, memory loss, loss of strength, numbness, or tremors  SKIN: No itching, burning, rashes, or lesions   LYMPH Nodes: No enlarged glands  MUSCULOSKELETAL: No joint pain or swelling; No muscle, back, or extremity pain  All other review of systems are negative.  	  [ ] Unable to obtain    PHYSICAL EXAM:  T(C): 36.6 (05-09-24 @ 04:16), Max: 36.8 (05-08-24 @ 20:55)  HR: 71 (05-09-24 @ 04:16) (71 - 84)  BP: 115/64 (05-09-24 @ 04:16) (109/73 - 124/70)  RR: 18 (05-09-24 @ 04:16) (18 - 18)  SpO2: 97% (05-09-24 @ 04:16) (97% - 98%)  Wt(kg): --  I&O's Summary    08 May 2024 07:01  -  09 May 2024 07:00  --------------------------------------------------------  IN: 240 mL / OUT: 1250 mL / NET: -1010 mL    09 May 2024 07:01  -  09 May 2024 07:52  --------------------------------------------------------  IN: 0 mL / OUT: 275 mL / NET: -275 mL          PHYSICAL EXAM    Appearance: Normal	  HEENT:   Normal oral mucosa, PERRL, EOMI	  NECK: Soft and supple, No LAD, No JVD  Cardiovascular: Regular Rate and Rhythm, Normal S1 S2, No murmurs, No clicks, gallops or rubs  Respiratory: Lungs clear to auscultation	  Gastrointestinal:  Soft, Non-tender, + BS	  Skin: No rashes, No ecchymoses, No cyanosis  Neurologic: Non-focal  Extremities: No clubbing, cyanosis or edema  Vascular: Peripheral pulses palpable 2+ bilaterally    LABS:	 	                          8.8    20.36 )-----------( 355      ( 09 May 2024 06:50 )             27.3     05-09    140  |  101  |  60<H>  ----------------------------<  53<LL>  3.3<L>   |  28  |  1.37<H>    Ca    8.4      09 May 2024 06:48

## 2024-05-10 LAB
ADD ON TEST-SPECIMEN IN LAB: SIGNIFICANT CHANGE UP
ANION GAP SERPL CALC-SCNC: 12 MMOL/L — SIGNIFICANT CHANGE UP (ref 5–17)
BUN SERPL-MCNC: 61 MG/DL — HIGH (ref 7–23)
CALCIUM SERPL-MCNC: 8.6 MG/DL — SIGNIFICANT CHANGE UP (ref 8.4–10.5)
CHLORIDE SERPL-SCNC: 99 MMOL/L — SIGNIFICANT CHANGE UP (ref 96–108)
CK SERPL-CCNC: 769 U/L — HIGH (ref 30–200)
CO2 SERPL-SCNC: 27 MMOL/L — SIGNIFICANT CHANGE UP (ref 22–31)
CREAT SERPL-MCNC: 1.42 MG/DL — HIGH (ref 0.5–1.3)
EGFR: 54 ML/MIN/1.73M2 — LOW
GLUCOSE BLDC GLUCOMTR-MCNC: 158 MG/DL — HIGH (ref 70–99)
GLUCOSE BLDC GLUCOMTR-MCNC: 231 MG/DL — HIGH (ref 70–99)
GLUCOSE BLDC GLUCOMTR-MCNC: 271 MG/DL — HIGH (ref 70–99)
GLUCOSE BLDC GLUCOMTR-MCNC: 337 MG/DL — HIGH (ref 70–99)
GLUCOSE SERPL-MCNC: 141 MG/DL — HIGH (ref 70–99)
HCT VFR BLD CALC: 26.9 % — LOW (ref 39–50)
HGB BLD-MCNC: 8.7 G/DL — LOW (ref 13–17)
MAGNESIUM SERPL-MCNC: 2.3 MG/DL — SIGNIFICANT CHANGE UP (ref 1.6–2.6)
MCHC RBC-ENTMCNC: 28.6 PG — SIGNIFICANT CHANGE UP (ref 27–34)
MCHC RBC-ENTMCNC: 32.3 GM/DL — SIGNIFICANT CHANGE UP (ref 32–36)
MCV RBC AUTO: 88.5 FL — SIGNIFICANT CHANGE UP (ref 80–100)
NRBC # BLD: 0 /100 WBCS — SIGNIFICANT CHANGE UP (ref 0–0)
PLATELET # BLD AUTO: 330 K/UL — SIGNIFICANT CHANGE UP (ref 150–400)
POTASSIUM SERPL-MCNC: 4 MMOL/L — SIGNIFICANT CHANGE UP (ref 3.5–5.3)
POTASSIUM SERPL-SCNC: 4 MMOL/L — SIGNIFICANT CHANGE UP (ref 3.5–5.3)
RBC # BLD: 3.04 M/UL — LOW (ref 4.2–5.8)
RBC # FLD: 18.3 % — HIGH (ref 10.3–14.5)
SODIUM SERPL-SCNC: 138 MMOL/L — SIGNIFICANT CHANGE UP (ref 135–145)
WBC # BLD: 19.96 K/UL — HIGH (ref 3.8–10.5)
WBC # FLD AUTO: 19.96 K/UL — HIGH (ref 3.8–10.5)

## 2024-05-10 RX ORDER — INSULIN LISPRO 100/ML
9 VIAL (ML) SUBCUTANEOUS
Refills: 0 | Status: DISCONTINUED | OUTPATIENT
Start: 2024-05-10 | End: 2024-05-11

## 2024-05-10 RX ORDER — INSULIN GLARGINE 100 [IU]/ML
22 INJECTION, SOLUTION SUBCUTANEOUS AT BEDTIME
Refills: 0 | Status: DISCONTINUED | OUTPATIENT
Start: 2024-05-10 | End: 2024-05-11

## 2024-05-10 RX ADMIN — ISOSORBIDE DINITRATE 30 MILLIGRAM(S): 5 TABLET ORAL at 12:15

## 2024-05-10 RX ADMIN — Medication 650 MILLIGRAM(S): at 00:00

## 2024-05-10 RX ADMIN — ISOSORBIDE DINITRATE 30 MILLIGRAM(S): 5 TABLET ORAL at 17:23

## 2024-05-10 RX ADMIN — Medication 1 APPLICATION(S): at 12:56

## 2024-05-10 RX ADMIN — Medication 650 MILLIGRAM(S): at 12:16

## 2024-05-10 RX ADMIN — APIXABAN 2.5 MILLIGRAM(S): 2.5 TABLET, FILM COATED ORAL at 17:23

## 2024-05-10 RX ADMIN — Medication 50 MILLIGRAM(S): at 06:45

## 2024-05-10 RX ADMIN — Medication 9 UNIT(S): at 12:13

## 2024-05-10 RX ADMIN — Medication 9 UNIT(S): at 17:21

## 2024-05-10 RX ADMIN — INSULIN GLARGINE 22 UNIT(S): 100 INJECTION, SOLUTION SUBCUTANEOUS at 21:19

## 2024-05-10 RX ADMIN — Medication 2: at 17:21

## 2024-05-10 RX ADMIN — Medication 650 MILLIGRAM(S): at 13:48

## 2024-05-10 RX ADMIN — Medication 40 MILLIGRAM(S): at 14:53

## 2024-05-10 RX ADMIN — Medication 650 MILLIGRAM(S): at 06:48

## 2024-05-10 RX ADMIN — Medication 1: at 21:18

## 2024-05-10 RX ADMIN — Medication 24 MILLIGRAM(S): at 08:31

## 2024-05-10 RX ADMIN — CHLORHEXIDINE GLUCONATE 1 APPLICATION(S): 213 SOLUTION TOPICAL at 12:56

## 2024-05-10 RX ADMIN — Medication 50 MILLIGRAM(S): at 17:23

## 2024-05-10 RX ADMIN — Medication 650 MILLIGRAM(S): at 07:30

## 2024-05-10 RX ADMIN — Medication 40 MILLIGRAM(S): at 06:45

## 2024-05-10 RX ADMIN — Medication 1: at 08:29

## 2024-05-10 RX ADMIN — CLOPIDOGREL BISULFATE 75 MILLIGRAM(S): 75 TABLET, FILM COATED ORAL at 12:16

## 2024-05-10 RX ADMIN — Medication 4: at 12:13

## 2024-05-10 RX ADMIN — APIXABAN 2.5 MILLIGRAM(S): 2.5 TABLET, FILM COATED ORAL at 06:46

## 2024-05-10 RX ADMIN — ISOSORBIDE DINITRATE 30 MILLIGRAM(S): 5 TABLET ORAL at 06:44

## 2024-05-10 NOTE — PROVIDER CONTACT NOTE (FALL NOTIFICATION) - ACTION/TREATMENT ORDERED:
provider notified and made aware. provider at bedside performing assessment. pt refusing CTH or further workup, stating he "is fine."

## 2024-05-10 NOTE — CHART NOTE - NSCHARTNOTEFT_GEN_A_CORE
Pt had an unwitnessed fall off the commode earlier today.  He states he had pain in his right thigh at the muscle biopsy site and lost his balance.  He denies any associated dizziness.   He denies hitting his head or falling onto his arms or legs and denies any pain.      EXAM:  Head: Atraumatic, no bruising, no pain to palpation  HEENT: No signs of trauma, no bruising, swelling or scrapes  Extremities: B/L LE Edema, no pain or bruising, to knees or elbows or hands, Old, healing scattered skin abrasions noted on arms, Right posterior thigh with Biopsy site clean and dry, with firm fullness noted, significant signs of ecchymosis from buttocks to posterior knee in the healing stages,     A/P:  Given that fall was unwitnessed, head strike cannot be ruled out.  Pt refusing a CT Head.  No further imaging needed at this time.    Monitor closely for late signs of trauma

## 2024-05-10 NOTE — PROVIDER CONTACT NOTE (OTHER) - ACTION/TREATMENT ORDERED:
provider notified and made  aware, provider added Mg to AM labs, pending results. no other interventions at this time.

## 2024-05-10 NOTE — PROGRESS NOTE ADULT - ASSESSMENT
70 y/o man      h/o severe CAD S/P multiple PCI's including multiple LM and LAD stents.       +A-fib, HTN, HLD, T2DM, CKD3, former smoker     presents with  weaknss/   leg  pains.  no leg  weakness      + troponin /  elevated  cpk      CAD,  has   10  stents/  HTN/  HLD        cath,   4/18,  2.0 mm caliber TITO to distal LAD,Dr ILDA Dominguez        on  dapt/  ef   47     troponin,  likely   demand  ischemia.  not  mi  per  dr arellano,,  not a  candidate   for  further intervention,   small  caliber  vessels   c/c  Afib,  on eliquis         elevated  CPK,    suspect  statin induced.  rhabdomyolysis/  form drug vs  polymyositis,    need  to  r/o  polymyositis, vs  drug induced   myositis,    mri  pelvis,  myositis/  fx  femoral condyle.  ortho  eval  noted,  rec immobilizer  pt refusing    ct a/p, neck, no  malignancy   await   bx   by   surg DR. palomino/  on iv  steroid  per   rheum   cont off statin   fu BX   follow  path  monitor  MACARENA   cont steroids ..   appreciate rheum input : -given patients clinical improvement and downtrending CK, as well as hyperglycemia and leg swelling, will favor to taper off medrol  -would c/w following taper: medrol 24mg (for 2 days), then medrol 16mg (for 2 days), medrol 12mg (for 2 days), medrol 8mg (for 2 days), then medrol 4mg (for 2 days), then stop  -please monitor CK and muscle strength at rehab  -can follow up muscle biopsy, myomarker panel, SRP ab as outpatient   -can follow up with Rheumatologist Dr Tasha Pelletier as outpatient within 2 weeks of hospital discharge - please ensure that it is coordinated by the patients rehab and rheumatology office        afib restarted eliquis     renal failure : monitor       elevated lfts,   abd  u/s,  normal liver       PAD/  will  f/p  a s an out pt with vascular. when stable   on asa/  eliquis, plavix, / lasix, imdur.  valsartan.  toprol      hold   Farxiga   and  valsartan for  now,  iv fluids,  per  renal   c/c  anemia            CAD : stent/on  4/17/24.   only   option , to   temporarily hold  eliquis  and   continue   plavix,   and   card  dr bravo and  dr brijesh dominguez in  agreement    wbc  noted, from steroid,  cpk/  lfts decreasing     edema : dc ivf   increase lasix .. will give one extra dose today   and change to po upon dc       Electrolyte abn : replete potassium aaggressively and repeat K and mag   rehab   discussed at length with acp and pt  and with CM   dc planning                     -

## 2024-05-10 NOTE — PROGRESS NOTE ADULT - SUBJECTIVE AND OBJECTIVE BOX
No dyspnea or chest pain  /56  HR 63 AF   Lungs clear  Irregular rhythm  2-3+ edema to the knees    BUN 61  Crt 1.42    WBC 19.96  Hgb 8.7  Hct 26.9  Plt 330K    Imp:  Rhabdomyolysis now much improved.  Remains fluid overloaded on Lasix 40 mg PO bid   His Renal function is at baseline  May consider increasing Lasix to 60 mg bid   Would continue the Eliquis at 2.5 mg bid despite the Crt 1.42 due to the large hematoma in the right posterior thigh  Continue to taper PO steroids every 2-3 days  Rehab transfer

## 2024-05-10 NOTE — PROGRESS NOTE ADULT - ASSESSMENT
68 y/o male with hx of CAD s/p multiple  stents (most recent to pLM (70%), pLAD, mLAD on 1/8/24 with Dr. Dominguez), HFrEF , A-fib, HTN, HLD, T2DM,  CKD3, former smoker  Assessment  DMT2: 69y Male with DM T2 with hyperglycemia, A1C 7.6% , was on oral meds and insulin at home  (Farxiga and  Semglee basal insulin), now on basal bolus insulins,  sugars are starting to trend down. now with labile sugars  was started on steroids, having steroid induced hyperglycemias  CAD: on medications, stable, monitored.   HTN: on antihypertensive medications, monitored, asymptomatic.  CKD: Monitor labs/BMP, renal follow up. rhabdo, muscle bx        Discussed plan and management with Dr Flo Jamil NP - TEAMS  Myrna Rossi MD  Cell: [phone]  Office: [phone]      70 y/o male with hx of CAD s/p multiple  stents (most recent to pLM (70%), pLAD, mLAD on 1/8/24 with Dr. Dominguez), HFrEF , A-fib, HTN, HLD, T2DM,  CKD3, former smoker  Assessment  DMT2: 69y Male with DM T2 with hyperglycemia, A1C 7.6% , was on oral meds and insulin  at home  (Farxiga and  Semglee basal insulin), now on basal bolus insulins,  sugars are starting to trend down. now with labile sugars  was started on steroids, having steroid induced hyperglycemias  CAD: on medications, stable, monitored.   HTN: on antihypertensive medications, monitored, asymptomatic.  CKD: Monitor labs/BMP, renal follow up. rhabdo, muscle bx        Discussed plan and management with Dr Flo Jamil NP - TEAMS  Myrna Rossi MD  Cell: [phone]  Office: [phone]

## 2024-05-10 NOTE — PROGRESS NOTE ADULT - SUBJECTIVE AND OBJECTIVE BOX
Chief complaint  Patient is a 69y old  Male who presents with a chief complaint of Rhabdo (10 May 2024 09:57)         Labs and Fingersticks  CAPILLARY BLOOD GLUCOSE      POCT Blood Glucose.: 158 mg/dL (10 May 2024 08:06)  POCT Blood Glucose.: 239 mg/dL (09 May 2024 22:19)  POCT Blood Glucose.: 330 mg/dL (09 May 2024 21:09)  POCT Blood Glucose.: 346 mg/dL (09 May 2024 20:27)  POCT Blood Glucose.: 460 mg/dL (09 May 2024 16:33)  POCT Blood Glucose.: 474 mg/dL (09 May 2024 16:32)  POCT Blood Glucose.: 330 mg/dL (09 May 2024 12:45)      Anion Gap: 12 (05-10 @ 07:05)  Anion Gap: 11 (05-09 @ 06:48)      Calcium: 8.6 (05-10 @ 07:05)  Calcium: 8.4 (05-09 @ 06:48)          05-10    138  |  99  |  61<H>  ----------------------------<  141<H>  4.0   |  27  |  1.42<H>    Ca    8.6      10 May 2024 07:05  Mg     2.0     05-09                          8.7    19.96 )-----------( 330      ( 10 May 2024 07:06 )             26.9     Medications  MEDICATIONS  (STANDING):  apixaban 2.5 milliGRAM(s) Oral every 12 hours  chlorhexidine 2% Cloths 1 Application(s) Topical daily  clopidogrel Tablet 75 milliGRAM(s) Oral daily  dextrose 10% Bolus 125 milliLiter(s) IV Bolus once  dextrose 5%. 1000 milliLiter(s) (100 mL/Hr) IV Continuous <Continuous>  dextrose 5%. 1000 milliLiter(s) (50 mL/Hr) IV Continuous <Continuous>  dextrose 50% Injectable 12.5 Gram(s) IV Push once  dextrose 50% Injectable 25 Gram(s) IV Push once  furosemide    Tablet 40 milliGRAM(s) Oral two times a day  glucagon  Injectable 1 milliGRAM(s) IntraMuscular once  influenza  Vaccine (HIGH DOSE) 0.7 milliLiter(s) IntraMuscular once  insulin glargine Injectable (LANTUS) 22 Unit(s) SubCutaneous at bedtime  insulin lispro (ADMELOG) corrective regimen sliding scale   SubCutaneous at bedtime  insulin lispro (ADMELOG) corrective regimen sliding scale   SubCutaneous three times a day before meals  insulin lispro Injectable (ADMELOG) 9 Unit(s) SubCutaneous three times a day before meals  isosorbide   dinitrate Tablet (ISORDIL) 30 milliGRAM(s) Oral three times a day  methylPREDNISolone 24 milliGRAM(s) Oral every 24 hours  metoprolol tartrate 50 milliGRAM(s) Oral two times a day  polyethylene glycol 3350 17 Gram(s) Oral two times a day  senna 2 Tablet(s) Oral at bedtime  silver sulfADIAZINE 1% Cream 1 Application(s) Topical daily      Physical Exam  General: Patient comfortable in bed   Vital Signs Last 12 Hrs  T(F): 97.4 (05-10-24 @ 11:34), Max: 97.8 (05-10-24 @ 04:24)  HR: 96 (05-10-24 @ 11:34) (63 - 96)  BP: 124/73 (05-10-24 @ 11:34) (113/56 - 124/73)  BP(mean): --  RR: 18 (05-10-24 @ 11:34) (18 - 18)  SpO2: 99% (05-10-24 @ 11:34) (99% - 99%)    CVS: S1S2   Respiratory: No wheezing, no crepitations  GI: Abdomen soft, bowel sounds positive  Musculoskeletal:  moves all extremities         Chief complaint  Patient is a 69y old  Male who presents with a chief complaint of Rhabdo (10 May 2024 09:57)         Labs and Fingersticks  CAPILLARY BLOOD GLUCOSE      POCT Blood Glucose.: 158 mg/dL (10 May 2024 08:06)  POCT Blood Glucose.: 239 mg/dL (09 May 2024 22:19)  POCT Blood Glucose.: 330 mg/dL (09 May 2024 21:09)  POCT Blood Glucose.: 346 mg/dL (09 May 2024 20:27)  POCT Blood Glucose.: 460 mg/dL (09 May 2024 16:33)  POCT Blood Glucose.: 474 mg/dL (09 May 2024 16:32)  POCT Blood Glucose.: 330 mg/dL (09 May 2024 12:45)      Anion Gap: 12 (05-10 @ 07:05)  Anion Gap: 11 (05-09 @ 06:48)      Calcium: 8.6 (05-10 @ 07:05)  Calcium: 8.4 (05-09 @ 06:48)          05-10    138  |  99  |  61<H>  ----------------------------<  141<H>  4.0   |  27  |  1.42<H>    Ca    8.6      10 May 2024 07:05  Mg     2.0     05-09                          8.7    19.96 )-----------( 330      ( 10 May 2024 07:06 )             26.9     Medications  MEDICATIONS  (STANDING):  apixaban 2.5 milliGRAM(s) Oral every 12 hours  chlorhexidine 2% Cloths 1 Application(s) Topical daily  clopidogrel Tablet 75 milliGRAM(s) Oral daily  dextrose 10% Bolus 125 milliLiter(s) IV Bolus once  dextrose 5%. 1000 milliLiter(s) (100 mL/Hr) IV Continuous <Continuous>  dextrose 5%. 1000 milliLiter(s) (50 mL/Hr) IV Continuous <Continuous>  dextrose 50% Injectable 12.5 Gram(s) IV Push once  dextrose 50% Injectable 25 Gram(s) IV Push once  furosemide    Tablet 40 milliGRAM(s) Oral two times a day  glucagon  Injectable 1 milliGRAM(s) IntraMuscular once  influenza  Vaccine (HIGH DOSE) 0.7 milliLiter(s) IntraMuscular once  insulin glargine Injectable (LANTUS) 22 Unit(s) SubCutaneous at bedtime  insulin lispro (ADMELOG) corrective regimen sliding scale   SubCutaneous at bedtime  insulin lispro (ADMELOG) corrective regimen sliding scale   SubCutaneous three times a day before meals  insulin lispro Injectable (ADMELOG) 9 Unit(s) SubCutaneous three times a day before meals  isosorbide   dinitrate Tablet (ISORDIL) 30 milliGRAM(s) Oral three times a day  methylPREDNISolone 24 milliGRAM(s) Oral every 24 hours  metoprolol tartrate 50 milliGRAM(s) Oral two times a day  polyethylene glycol 3350 17 Gram(s) Oral two times a day  senna 2 Tablet(s) Oral at bedtime  silver sulfADIAZINE 1% Cream 1 Application(s) Topical daily      Physical Exam  General: Patient comfortable in bed   Vital Signs Last 12 Hrs  T(F): 97.4 (05-10-24 @ 11:34), Max: 97.8 (05-10-24 @ 04:24)  HR: 96 (05-10-24 @ 11:34) (63 - 96)  BP: 124/73 (05-10-24 @ 11:34) (113/56 - 124/73)  BP(mean): --  RR: 18 (05-10-24 @ 11:34) (18 - 18)  SpO2: 99% (05-10-24 @ 11:34) (99% - 99%)    CVS: S1S2   Respiratory: No wheezing, no crepitations  GI: Abdomen soft, bowel sounds positive  Musculoskeletal:  moves all extremities

## 2024-05-10 NOTE — PROGRESS NOTE ADULT - PROBLEM SELECTOR PLAN 1
Will increase Lantus to 22 units at bed time.  Will increase  Admelog to 9 units before each meal in addition to Admelog correction scale coverage.  Patient counseled for compliance with consistent low carb diet.     Insulins to be adjusted pending steroid taper  DC on home Semglee   Stop home Farxiga altogther and any SGLT2i due to recent BARRY/rhabdo  OP follow up

## 2024-05-10 NOTE — PROGRESS NOTE ADULT - SUBJECTIVE AND OBJECTIVE BOX
Overnight events noted      VITAL:  T(C): , Max: 36.7 (05-09-24 @ 11:33)  T(F): , Max: 98 (05-09-24 @ 11:33)  HR: 63 (05-10-24 @ 04:24)  BP: 113/56 (05-10-24 @ 04:24)  BP(mean): --  RR: 18 (05-10-24 @ 04:24)  SpO2: 99% (05-10-24 @ 04:24)  Wt(kg): --      PHYSICAL EXAM:  Constitutional: alert, NAD  HEENT: NCAT, MMM  Neck: Supple, No JVD  Respiratory: CTA-b/l  Cardiovascular: RRR s1s2, no m/r/g  Gastrointestinal: BS+, soft, NT/ND  Extremities: (+)b/l LE edema/mild tenderness  Neurological: no focal deficits; strength grossly intact  Back: no CVAT b/l  Skin: No rashes, no nevi      LABS:                        8.7    19.96 )-----------( 330      ( 10 May 2024 07:06 )             26.9     Na(138)/K(4.0)/Cl(99)/HCO3(27)/BUN(61)/Cr(1.42)Glu(141)/Ca(8.6)/Mg(--)/PO4(--)    05-10 @ 07:05  Na(140)/K(3.3)/Cl(101)/HCO3(28)/BUN(60)/Cr(1.37)Glu(53)/Ca(8.4)/Mg(2.0)/PO4(--)    05-09 @ 06:48  Na(139)/K(4.4)/Cl(100)/HCO3(24)/BUN(56)/Cr(1.25)Glu(197)/Ca(8.8)/Mg(--)/PO4(--)    05-07 @ 12:18          IMPRESSION: 69M w/ HTN, DM2, CAD, AFib, HFrEF, and CKD, s/p recent PCI, 4/25/24 returned with acute rhabdomyolysis    (1)Renal - CKD3b - due to diabetes/hypertension - at/near baseline  (2)Rhabdo - improving, off IVF  (3)Hypokalemia - improved s/p repletion  (4)Rheum - inflammatory myositis - Surgery and Rheum on board- s/p muscle biopsy 5/2, results pending      RECOMMEND:  (1)Meds as ordered/dose new meds for GFR 30-40ml/min  (2)F/U muscle biopsy results            Markell Walton MD  [facility]  Office/on call physician: [phone]  Cell (7a-7y): [phone]       (+)mild b/l LE pain/associated LE weakness      VITAL:  T(C): , Max: 36.7 (05-09-24 @ 11:33)  T(F): , Max: 98 (05-09-24 @ 11:33)  HR: 63 (05-10-24 @ 04:24)  BP: 113/56 (05-10-24 @ 04:24)  BP(mean): --  RR: 18 (05-10-24 @ 04:24)  SpO2: 99% (05-10-24 @ 04:24)  Wt(kg): --      PHYSICAL EXAM:  Constitutional: alert, NAD  HEENT: NCAT, MMM  Neck: Supple, No JVD  Respiratory: CTA-b/l  Cardiovascular: RRR s1s2, no m/r/g  Gastrointestinal: BS+, soft, NT/ND  Extremities: (+)b/l LE edema/mild tenderness  Neurological: no focal deficits; strength grossly intact  Back: no CVAT b/l  Skin: No rashes, no nevi      LABS:                        8.7    19.96 )-----------( 330      ( 10 May 2024 07:06 )             26.9     Na(138)/K(4.0)/Cl(99)/HCO3(27)/BUN(61)/Cr(1.42)Glu(141)/Ca(8.6)/Mg(--)/PO4(--)    05-10 @ 07:05  Na(140)/K(3.3)/Cl(101)/HCO3(28)/BUN(60)/Cr(1.37)Glu(53)/Ca(8.4)/Mg(2.0)/PO4(--)    05-09 @ 06:48  Na(139)/K(4.4)/Cl(100)/HCO3(24)/BUN(56)/Cr(1.25)Glu(197)/Ca(8.8)/Mg(--)/PO4(--)    05-07 @ 12:18          IMPRESSION: 69M w/ HTN, DM2, CAD, AFib, HFrEF, and CKD, s/p recent PCI, 4/25/24 returned with acute rhabdomyolysis    (1)Renal - CKD3b - due to diabetes/hypertension - at/near baseline  (2)Rhabdo - improving, off IVF  (3)Hypokalemia - improved s/p repletion  (4)Rheum - inflammatory myositis - Surgery and Rheum on board- s/p muscle biopsy 5/2, results pending      RECOMMEND:  (1)Meds as ordered/dose new meds for GFR 30-40ml/min  (2)F/U muscle biopsy results            Markell Walton MD  [facility]  Office/on call physician: [phone]  Cell (7a-7p): [phone]

## 2024-05-10 NOTE — PROVIDER CONTACT NOTE (FALL NOTIFICATION) - ASSESSMENT
vss, pt denies headstrike, no LOC, awake, alert. patient found by magali RN at time of incident, reports falling on his side, supported by arm. ecchymosis to R posterior thigh, previously there from muscle biopsy. no other areas of ecchymosis.

## 2024-05-10 NOTE — PROGRESS NOTE ADULT - SUBJECTIVE AND OBJECTIVE BOX
Date of service: 05-10-24 @ 12:55      Patient is a 69y old  Male who presents with a chief complaint of Rhabdo (10 May 2024 12:09)                                                               INTERVAL HPI/OVERNIGHT EVENTS:    REVIEW OF SYSTEMS:     CONSTITUTIONAL: No weakness, fevers or chills  EYES/ENT: No visual changes , no ear ache   NECK: No pain or stiffness  RESPIRATORY: No cough, wheezing,  No shortness of breath  CARDIOVASCULAR: No chest pain or palpitations  GASTROINTESTINAL: No abdominal pain  . No nausea, vomiting, or hematemesis; No diarrhea or constipation. No melena or hematochezia.  GENITOURINARY: No dysuria, frequency or hematuria  NEUROLOGICAL: No numbness or weakness  SKIN: No itching, burning, rashes, or lesions                                                                                                                                                                                                                                                                                 Medications:  MEDICATIONS  (STANDING):  apixaban 2.5 milliGRAM(s) Oral every 12 hours  chlorhexidine 2% Cloths 1 Application(s) Topical daily  clopidogrel Tablet 75 milliGRAM(s) Oral daily  dextrose 10% Bolus 125 milliLiter(s) IV Bolus once  dextrose 5%. 1000 milliLiter(s) (100 mL/Hr) IV Continuous <Continuous>  dextrose 5%. 1000 milliLiter(s) (50 mL/Hr) IV Continuous <Continuous>  dextrose 50% Injectable 12.5 Gram(s) IV Push once  dextrose 50% Injectable 25 Gram(s) IV Push once  furosemide    Tablet 40 milliGRAM(s) Oral two times a day  glucagon  Injectable 1 milliGRAM(s) IntraMuscular once  influenza  Vaccine (HIGH DOSE) 0.7 milliLiter(s) IntraMuscular once  insulin glargine Injectable (LANTUS) 22 Unit(s) SubCutaneous at bedtime  insulin lispro (ADMELOG) corrective regimen sliding scale   SubCutaneous at bedtime  insulin lispro (ADMELOG) corrective regimen sliding scale   SubCutaneous three times a day before meals  insulin lispro Injectable (ADMELOG) 9 Unit(s) SubCutaneous three times a day before meals  isosorbide   dinitrate Tablet (ISORDIL) 30 milliGRAM(s) Oral three times a day  methylPREDNISolone 24 milliGRAM(s) Oral every 24 hours  metoprolol tartrate 50 milliGRAM(s) Oral two times a day  polyethylene glycol 3350 17 Gram(s) Oral two times a day  senna 2 Tablet(s) Oral at bedtime  silver sulfADIAZINE 1% Cream 1 Application(s) Topical daily    MEDICATIONS  (PRN):  acetaminophen     Tablet .. 650 milliGRAM(s) Oral every 6 hours PRN Temp greater or equal to 38C (100.4F), Mild Pain (1 - 3)  bisacodyl 5 milliGRAM(s) Oral every 12 hours PRN Constipation  dextrose Oral Gel 15 Gram(s) Oral once PRN Blood Glucose LESS THAN 70 milliGRAM(s)/deciliter       Allergies    atorvastatin (Muscle Pain (Severe))    Intolerances      Vital Signs Last 24 Hrs  T(C): 36.3 (10 May 2024 11:34), Max: 36.6 (09 May 2024 20:57)  T(F): 97.4 (10 May 2024 11:34), Max: 97.9 (09 May 2024 20:57)  HR: 96 (10 May 2024 11:34) (63 - 96)  BP: 124/73 (10 May 2024 11:34) (103/60 - 124/73)  BP(mean): --  RR: 18 (10 May 2024 11:34) (18 - 18)  SpO2: 99% (10 May 2024 11:34) (98% - 99%)    Parameters below as of 10 May 2024 11:34  Patient On (Oxygen Delivery Method): room air      CAPILLARY BLOOD GLUCOSE      POCT Blood Glucose.: 337 mg/dL (10 May 2024 12:09)  POCT Blood Glucose.: 158 mg/dL (10 May 2024 08:06)  POCT Blood Glucose.: 239 mg/dL (09 May 2024 22:19)  POCT Blood Glucose.: 330 mg/dL (09 May 2024 21:09)  POCT Blood Glucose.: 346 mg/dL (09 May 2024 20:27)  POCT Blood Glucose.: 460 mg/dL (09 May 2024 16:33)  POCT Blood Glucose.: 474 mg/dL (09 May 2024 16:32)       @ 07:  -  05-10 @ 07:00  --------------------------------------------------------  IN: 0 mL / OUT: 1675 mL / NET: -1675 mL    05-10 @ 07:01  -  05-10 @ 12:55  --------------------------------------------------------  IN: 240 mL / OUT: 400 mL / NET: -160 mL      Physical Exam:    Daily     Daily Weight in k.8 (10 May 2024 07:59)  General:  Well appearing, NAD, not cachetic  HEENT:  Nonicteric, PERRLA  CV:  RRR, S1S2   Lungs:  CTA B/L, no wheezes, rales, rhonchi  Abdomen:  Soft, non-tender, no distended, positive BS  Extremities: edema   Neuro:  AAOx3, non-focal, grossly intact                                                                                                                                                                                                                                                                                                LABS:                               8.7    19.96 )-----------( 330      ( 10 May 2024 07:06 )             26.9                      05-10    138  |  99  |  61<H>  ----------------------------<  141<H>  4.0   |  27  |  1.42<H>    Ca    8.6      10 May 2024 07:05  Mg     2.0     05-09                         RADIOLOGY & ADDITIONAL TESTS         I personally reviewed: [  ]EKG   [  ]CXR    [  ] CT      A/P:         Discussed with :     Derek consultants' Notes   Time spent :

## 2024-05-11 LAB
ANION GAP SERPL CALC-SCNC: 10 MMOL/L — SIGNIFICANT CHANGE UP (ref 5–17)
BUN SERPL-MCNC: 61 MG/DL — HIGH (ref 7–23)
CALCIUM SERPL-MCNC: 8.1 MG/DL — LOW (ref 8.4–10.5)
CHLORIDE SERPL-SCNC: 97 MMOL/L — SIGNIFICANT CHANGE UP (ref 96–108)
CK SERPL-CCNC: 582 U/L — HIGH (ref 30–200)
CO2 SERPL-SCNC: 27 MMOL/L — SIGNIFICANT CHANGE UP (ref 22–31)
CREAT SERPL-MCNC: 1.7 MG/DL — HIGH (ref 0.5–1.3)
EGFR: 44 ML/MIN/1.73M2 — LOW
GLUCOSE BLDC GLUCOMTR-MCNC: 193 MG/DL — HIGH (ref 70–99)
GLUCOSE BLDC GLUCOMTR-MCNC: 257 MG/DL — HIGH (ref 70–99)
GLUCOSE BLDC GLUCOMTR-MCNC: 269 MG/DL — HIGH (ref 70–99)
GLUCOSE BLDC GLUCOMTR-MCNC: 85 MG/DL — SIGNIFICANT CHANGE UP (ref 70–99)
GLUCOSE SERPL-MCNC: 169 MG/DL — HIGH (ref 70–99)
MAGNESIUM SERPL-MCNC: 2.3 MG/DL — SIGNIFICANT CHANGE UP (ref 1.6–2.6)
POTASSIUM SERPL-MCNC: 3.5 MMOL/L — SIGNIFICANT CHANGE UP (ref 3.5–5.3)
POTASSIUM SERPL-SCNC: 3.5 MMOL/L — SIGNIFICANT CHANGE UP (ref 3.5–5.3)
SODIUM SERPL-SCNC: 134 MMOL/L — LOW (ref 135–145)

## 2024-05-11 RX ORDER — INSULIN LISPRO 100/ML
11 VIAL (ML) SUBCUTANEOUS
Refills: 0 | Status: DISCONTINUED | OUTPATIENT
Start: 2024-05-11 | End: 2024-05-11

## 2024-05-11 RX ORDER — INSULIN GLARGINE 100 [IU]/ML
23 INJECTION, SOLUTION SUBCUTANEOUS AT BEDTIME
Refills: 0 | Status: DISCONTINUED | OUTPATIENT
Start: 2024-05-11 | End: 2024-05-11

## 2024-05-11 RX ORDER — INSULIN LISPRO 100/ML
10 VIAL (ML) SUBCUTANEOUS
Refills: 0 | Status: DISCONTINUED | OUTPATIENT
Start: 2024-05-11 | End: 2024-05-12

## 2024-05-11 RX ORDER — INSULIN GLARGINE 100 [IU]/ML
20 INJECTION, SOLUTION SUBCUTANEOUS AT BEDTIME
Refills: 0 | Status: DISCONTINUED | OUTPATIENT
Start: 2024-05-11 | End: 2024-05-12

## 2024-05-11 RX ADMIN — CLOPIDOGREL BISULFATE 75 MILLIGRAM(S): 75 TABLET, FILM COATED ORAL at 12:54

## 2024-05-11 RX ADMIN — Medication 650 MILLIGRAM(S): at 09:06

## 2024-05-11 RX ADMIN — Medication 1: at 22:03

## 2024-05-11 RX ADMIN — CHLORHEXIDINE GLUCONATE 1 APPLICATION(S): 213 SOLUTION TOPICAL at 12:56

## 2024-05-11 RX ADMIN — ISOSORBIDE DINITRATE 30 MILLIGRAM(S): 5 TABLET ORAL at 05:30

## 2024-05-11 RX ADMIN — Medication 650 MILLIGRAM(S): at 01:52

## 2024-05-11 RX ADMIN — ISOSORBIDE DINITRATE 30 MILLIGRAM(S): 5 TABLET ORAL at 17:22

## 2024-05-11 RX ADMIN — Medication 1: at 12:53

## 2024-05-11 RX ADMIN — INSULIN GLARGINE 20 UNIT(S): 100 INJECTION, SOLUTION SUBCUTANEOUS at 22:03

## 2024-05-11 RX ADMIN — Medication 1 APPLICATION(S): at 12:54

## 2024-05-11 RX ADMIN — Medication 10 UNIT(S): at 17:20

## 2024-05-11 RX ADMIN — Medication 40 MILLIGRAM(S): at 12:57

## 2024-05-11 RX ADMIN — Medication 3: at 17:19

## 2024-05-11 RX ADMIN — Medication 650 MILLIGRAM(S): at 02:23

## 2024-05-11 RX ADMIN — Medication 50 MILLIGRAM(S): at 17:23

## 2024-05-11 RX ADMIN — APIXABAN 2.5 MILLIGRAM(S): 2.5 TABLET, FILM COATED ORAL at 05:31

## 2024-05-11 RX ADMIN — Medication 50 MILLIGRAM(S): at 05:31

## 2024-05-11 RX ADMIN — Medication 40 MILLIGRAM(S): at 05:30

## 2024-05-11 RX ADMIN — ISOSORBIDE DINITRATE 30 MILLIGRAM(S): 5 TABLET ORAL at 12:54

## 2024-05-11 RX ADMIN — APIXABAN 2.5 MILLIGRAM(S): 2.5 TABLET, FILM COATED ORAL at 17:22

## 2024-05-11 RX ADMIN — Medication 10 UNIT(S): at 12:56

## 2024-05-11 RX ADMIN — Medication 650 MILLIGRAM(S): at 08:36

## 2024-05-11 RX ADMIN — Medication 24 MILLIGRAM(S): at 08:35

## 2024-05-11 NOTE — PROGRESS NOTE ADULT - SUBJECTIVE AND OBJECTIVE BOX
Date of service: 24 @ 23:17      Patient is a 69y old  Male who presents with a chief complaint of Rhabdo (11 May 2024 09:52)                                                               INTERVAL HPI/OVERNIGHT EVENTS:    REVIEW OF SYSTEMS:     CONSTITUTIONAL: No weakness, fevers or chills  RESPIRATORY: No cough, wheezing,  No shortness of breath  CARDIOVASCULAR: No chest pain or palpitations  GASTROINTESTINAL: No abdominal pain  . No nausea, vomiting, or hematemesis; No diarrhea or constipation. No melena or hematochezia.  GENITOURINARY: No dysuria, frequency or hematuria  NEUROLOGICAL: No numbness or weakness  SKIN: No itching, burning, rashes, or lesions                                                                                                                                                                                                                                                                                 Medications:  MEDICATIONS  (STANDING):  apixaban 2.5 milliGRAM(s) Oral every 12 hours  chlorhexidine 2% Cloths 1 Application(s) Topical daily  clopidogrel Tablet 75 milliGRAM(s) Oral daily  dextrose 10% Bolus 125 milliLiter(s) IV Bolus once  dextrose 5%. 1000 milliLiter(s) (100 mL/Hr) IV Continuous <Continuous>  dextrose 5%. 1000 milliLiter(s) (50 mL/Hr) IV Continuous <Continuous>  dextrose 50% Injectable 12.5 Gram(s) IV Push once  dextrose 50% Injectable 25 Gram(s) IV Push once  furosemide    Tablet 40 milliGRAM(s) Oral two times a day  glucagon  Injectable 1 milliGRAM(s) IntraMuscular once  influenza  Vaccine (HIGH DOSE) 0.7 milliLiter(s) IntraMuscular once  insulin glargine Injectable (LANTUS) 20 Unit(s) SubCutaneous at bedtime  insulin lispro (ADMELOG) corrective regimen sliding scale   SubCutaneous at bedtime  insulin lispro (ADMELOG) corrective regimen sliding scale   SubCutaneous three times a day before meals  insulin lispro Injectable (ADMELOG) 10 Unit(s) SubCutaneous three times a day before meals  isosorbide   dinitrate Tablet (ISORDIL) 30 milliGRAM(s) Oral three times a day  metoprolol tartrate 50 milliGRAM(s) Oral two times a day  polyethylene glycol 3350 17 Gram(s) Oral two times a day  senna 2 Tablet(s) Oral at bedtime  silver sulfADIAZINE 1% Cream 1 Application(s) Topical daily    MEDICATIONS  (PRN):  acetaminophen     Tablet .. 650 milliGRAM(s) Oral every 6 hours PRN Temp greater or equal to 38C (100.4F), Mild Pain (1 - 3)  bisacodyl 5 milliGRAM(s) Oral every 12 hours PRN Constipation  dextrose Oral Gel 15 Gram(s) Oral once PRN Blood Glucose LESS THAN 70 milliGRAM(s)/deciliter       Allergies    atorvastatin (Muscle Pain (Severe))    Intolerances      Vital Signs Last 24 Hrs  T(C): 36.6 (11 May 2024 20:25), Max: 36.9 (11 May 2024 11:47)  T(F): 97.9 (11 May 2024 20:25), Max: 98.5 (11 May 2024 11:47)  HR: 91 (11 May 2024 20:25) (65 - 98)  BP: 110/64 (11 May 2024 20:25) (109/63 - 122/74)  BP(mean): --  RR: 18 (11 May 2024 20:25) (18 - 18)  SpO2: 97% (11 May 2024 20:25) (96% - 98%)    Parameters below as of 11 May 2024 20:25  Patient On (Oxygen Delivery Method): room air      CAPILLARY BLOOD GLUCOSE      POCT Blood Glucose.: 269 mg/dL (11 May 2024 21:08)  POCT Blood Glucose.: 257 mg/dL (11 May 2024 16:33)  POCT Blood Glucose.: 193 mg/dL (11 May 2024 12:11)  POCT Blood Glucose.: 85 mg/dL (11 May 2024 08:06)      05-10 @ 07: @ 07:00  --------------------------------------------------------  IN: 720 mL / OUT: 2500 mL / NET: -1780 mL     @ 07:  -   @ 23:17  --------------------------------------------------------  IN: 540 mL / OUT: 1800 mL / NET: -1260 mL      Physical Exam:    Daily     Daily Weight in k.2 (11 May 2024 12:01)  General:  Well appearing, NAD, not cachetic  HEENT:  Nonicteric, PERRLA  CV:  RRR, S1S2   Lungs:  CTA B/L, no wheezes, rales, rhonchi  Abdomen:  Soft, non-tender, no distended, positive BS  Extremities: edema  Neuro:  AAOx3, non-focal, grossly intact                                                                                                                                                                                                                                                                                                LABS:                               8.7    19.96 )-----------( 330      ( 10 May 2024 07:06 )             26.9                      05-11    134<L>  |  97  |  61<H>  ----------------------------<  169<H>  3.5   |  27  |  1.70<H>    Ca    8.1<L>      11 May 2024 05:52  Mg     2.3                              RADIOLOGY & ADDITIONAL TESTS         I personally reviewed: [  ]EKG   [  ]CXR    [  ] CT      A/P:         Discussed with :     Derek consultants' Notes   Time spent :

## 2024-05-11 NOTE — PROGRESS NOTE ADULT - ASSESSMENT
70 y/o male with hx of CAD s/p multiple  stents (most recent to pLM (70%), pLAD, mLAD on 1/8/24 with Dr. Dominguez), HFrEF , A-fib, HTN, HLD, T2DM,  CKD3, former smoker  Assessment  DMT2: 69y Male with DM T2 with hyperglycemia, A1C 7.6% , was on oral meds and insulin  at home  (Farxiga and  Semglee basal insulin), now on basal bolus insulins, sugars are fluctuating, now with labile sugars  was on steroids, insulin dose adjusted.  CAD: on medications, stable, monitored.   HTN: on antihypertensive medications, monitored, asymptomatic.  CKD: Monitor labs/BMP, renal follow up. rhabdo, muscle bx    Myrna Rossi MD  Cell: [phone]  Office: [phone]

## 2024-05-11 NOTE — PROGRESS NOTE ADULT - ASSESSMENT
68 y/o man      h/o severe CAD S/P multiple PCI's including multiple LM and LAD stents.       +A-fib, HTN, HLD, T2DM, CKD3, former smoker     presents with  weaknss/   leg  pains.  no leg  weakness      + troponin /  elevated  cpk      CAD,  has   10  stents/  HTN/  HLD        cath,   4/18,  2.0 mm caliber TITO to distal LAD,Dr ILDA Dominguez        on  dapt/  ef   47     troponin,  likely   demand  ischemia.  not  mi  per  dr arellano,,  not a  candidate   for  further intervention,   small  caliber  vessels   c/c  Afib,  on eliquis         elevated  CPK,    suspect  statin induced.  rhabdomyolysis/  form drug vs  polymyositis,    need  to  r/o  polymyositis, vs  drug induced   myositis,    mri  pelvis,  myositis/  fx  femoral condyle.  ortho  eval  noted,  rec immobilizer  pt refusing    ct a/p, neck, no  malignancy   await   bx   by   surg DR. palomino/  on iv  steroid  per   rheum   cont off statin   fu BX   follow  path  monitor  MACARENA   cont steroids ..   appreciate rheum input : -given patients clinical improvement and downtrending CK, as well as hyperglycemia and leg swelling, will favor to taper off medrol  -would c/w following taper: medrol 24mg (for 2 days), then medrol 16mg (for 2 days), medrol 12mg (for 2 days), medrol 8mg (for 2 days), then medrol 4mg (for 2 days), then stop  -please monitor CK and muscle strength at rehab  -can follow up muscle biopsy, myomarker panel, SRP ab as outpatient   -can follow up with Rheumatologist Dr Tasha Pelletier as outpatient within 2 weeks of hospital discharge - please ensure that it is coordinated by the patients rehab and rheumatology office        afib restarted eliquis     renal failure : monitor       elevated lfts,   abd  u/s,  normal liver       PAD/  will  f/p  a s an out pt with vascular. when stable   on asa/  eliquis, plavix, / lasix, imdur.  valsartan.  toprol      hold   Farxiga   and  valsartan for  now,  iv fluids,  per  renal   c/c  anemia            CAD : stent/on  4/17/24.   only   option , to   temporarily hold  eliquis  and   continue   plavix,   and   card  dr bravo and  dr brijesh dominguez in  agreement    wbc  noted, from steroid,  cpk/  lfts decreasing     edema : dc ivf   increase lasix .. will give one extra dose today   and change to po upon dc       Electrolyte abn : replete potassium aaggressively and repeat K and mag   rehab   discussed at length with acp and pt  and with CM   dc planning                     -

## 2024-05-11 NOTE — PROGRESS NOTE ADULT - SUBJECTIVE AND OBJECTIVE BOX
OOB to chair   No dyspnea  Telem 10 beat WCT - asymptomatic  /74  HR 65  O2 Sat 98% on RA  Lungs clear  Irregular rhythm  2-3+ edema bilateral    BUN 61  Crt 1.70      Imp:  Rhabdomyolysis - CPK continues to improve  On tapering PO steroids  His level of edema is unchanged  Consider increasing the Lasix to 60 mg bid  No treatment for the WCT

## 2024-05-11 NOTE — PROGRESS NOTE ADULT - SUBJECTIVE AND OBJECTIVE BOX
Chief complaint    Patient is a 69y old  Male who presents with a chief complaint of Rhabdo (10 May 2024 12:55)   Review of systems  Patient appears comfortable.    Labs and Fingersticks  CAPILLARY BLOOD GLUCOSE      POCT Blood Glucose.: 85 mg/dL (11 May 2024 08:06)  POCT Blood Glucose.: 271 mg/dL (10 May 2024 20:55)  POCT Blood Glucose.: 231 mg/dL (10 May 2024 16:47)  POCT Blood Glucose.: 337 mg/dL (10 May 2024 12:09)      Anion Gap: 10 (05-11 @ 05:52)  Anion Gap: 12 (05-10 @ 07:05)      Calcium: 8.1 *L* (05-11 @ 05:52)  Calcium: 8.6 (05-10 @ 07:05)          05-11    134<L>  |  97  |  61<H>  ----------------------------<  169<H>  3.5   |  27  |  1.70<H>    Ca    8.1<L>      11 May 2024 05:52  Mg     2.3     05-11                          8.7    19.96 )-----------( 330      ( 10 May 2024 07:06 )             26.9     Medications  MEDICATIONS  (STANDING):  apixaban 2.5 milliGRAM(s) Oral every 12 hours  chlorhexidine 2% Cloths 1 Application(s) Topical daily  clopidogrel Tablet 75 milliGRAM(s) Oral daily  dextrose 10% Bolus 125 milliLiter(s) IV Bolus once  dextrose 5%. 1000 milliLiter(s) (50 mL/Hr) IV Continuous <Continuous>  dextrose 5%. 1000 milliLiter(s) (100 mL/Hr) IV Continuous <Continuous>  dextrose 50% Injectable 12.5 Gram(s) IV Push once  dextrose 50% Injectable 25 Gram(s) IV Push once  furosemide    Tablet 40 milliGRAM(s) Oral two times a day  glucagon  Injectable 1 milliGRAM(s) IntraMuscular once  influenza  Vaccine (HIGH DOSE) 0.7 milliLiter(s) IntraMuscular once  insulin glargine Injectable (LANTUS) 20 Unit(s) SubCutaneous at bedtime  insulin lispro (ADMELOG) corrective regimen sliding scale   SubCutaneous at bedtime  insulin lispro (ADMELOG) corrective regimen sliding scale   SubCutaneous three times a day before meals  insulin lispro Injectable (ADMELOG) 10 Unit(s) SubCutaneous three times a day before meals  isosorbide   dinitrate Tablet (ISORDIL) 30 milliGRAM(s) Oral three times a day  methylPREDNISolone 24 milliGRAM(s) Oral every 24 hours  metoprolol tartrate 50 milliGRAM(s) Oral two times a day  polyethylene glycol 3350 17 Gram(s) Oral two times a day  senna 2 Tablet(s) Oral at bedtime  silver sulfADIAZINE 1% Cream 1 Application(s) Topical daily      Physical Exam  General: Patient appears comfortable.  Vital Signs Last 12 Hrs  T(F): 97.6 (05-11-24 @ 04:58), Max: 98 (05-10-24 @ 23:48)  HR: 65 (05-11-24 @ 04:58) (65 - 80)  BP: 122/74 (05-11-24 @ 04:58) (114/66 - 122/74)  BP(mean): --  RR: 18 (05-11-24 @ 04:58) (18 - 18)  SpO2: 98% (05-11-24 @ 04:58) (96% - 98%)  Neck: No palpable thyroid nodules.  CVS: S1S2, No murmurs  Respiratory: No wheezing, no crepitations  GI: Abdomen soft, non tender.    Diagnostics        Radiology:

## 2024-05-12 LAB
ANION GAP SERPL CALC-SCNC: 12 MMOL/L — SIGNIFICANT CHANGE UP (ref 5–17)
BUN SERPL-MCNC: 58 MG/DL — HIGH (ref 7–23)
CALCIUM SERPL-MCNC: 8.7 MG/DL — SIGNIFICANT CHANGE UP (ref 8.4–10.5)
CHLORIDE SERPL-SCNC: 98 MMOL/L — SIGNIFICANT CHANGE UP (ref 96–108)
CK SERPL-CCNC: 454 U/L — HIGH (ref 30–200)
CO2 SERPL-SCNC: 28 MMOL/L — SIGNIFICANT CHANGE UP (ref 22–31)
CREAT SERPL-MCNC: 1.46 MG/DL — HIGH (ref 0.5–1.3)
EGFR: 52 ML/MIN/1.73M2 — LOW
GLUCOSE BLDC GLUCOMTR-MCNC: 129 MG/DL — HIGH (ref 70–99)
GLUCOSE BLDC GLUCOMTR-MCNC: 183 MG/DL — HIGH (ref 70–99)
GLUCOSE BLDC GLUCOMTR-MCNC: 225 MG/DL — HIGH (ref 70–99)
GLUCOSE BLDC GLUCOMTR-MCNC: 98 MG/DL — SIGNIFICANT CHANGE UP (ref 70–99)
GLUCOSE SERPL-MCNC: 120 MG/DL — HIGH (ref 70–99)
HCT VFR BLD CALC: 28.3 % — LOW (ref 39–50)
HGB BLD-MCNC: 8.8 G/DL — LOW (ref 13–17)
POTASSIUM SERPL-MCNC: 3.2 MMOL/L — LOW (ref 3.5–5.3)
POTASSIUM SERPL-SCNC: 3.2 MMOL/L — LOW (ref 3.5–5.3)
SODIUM SERPL-SCNC: 138 MMOL/L — SIGNIFICANT CHANGE UP (ref 135–145)

## 2024-05-12 RX ORDER — OXYCODONE HYDROCHLORIDE 5 MG/1
5 TABLET ORAL EVERY 6 HOURS
Refills: 0 | Status: DISCONTINUED | OUTPATIENT
Start: 2024-05-12 | End: 2024-05-14

## 2024-05-12 RX ORDER — INSULIN LISPRO 100/ML
8 VIAL (ML) SUBCUTANEOUS
Refills: 0 | Status: DISCONTINUED | OUTPATIENT
Start: 2024-05-12 | End: 2024-05-14

## 2024-05-12 RX ORDER — INSULIN GLARGINE 100 [IU]/ML
17 INJECTION, SOLUTION SUBCUTANEOUS AT BEDTIME
Refills: 0 | Status: DISCONTINUED | OUTPATIENT
Start: 2024-05-12 | End: 2024-05-14

## 2024-05-12 RX ORDER — POTASSIUM CHLORIDE 20 MEQ
20 PACKET (EA) ORAL
Refills: 0 | Status: COMPLETED | OUTPATIENT
Start: 2024-05-12 | End: 2024-05-12

## 2024-05-12 RX ADMIN — APIXABAN 2.5 MILLIGRAM(S): 2.5 TABLET, FILM COATED ORAL at 05:15

## 2024-05-12 RX ADMIN — Medication 20 MILLIEQUIVALENT(S): at 15:24

## 2024-05-12 RX ADMIN — Medication 16 MILLIGRAM(S): at 08:43

## 2024-05-12 RX ADMIN — OXYCODONE HYDROCHLORIDE 5 MILLIGRAM(S): 5 TABLET ORAL at 18:13

## 2024-05-12 RX ADMIN — APIXABAN 2.5 MILLIGRAM(S): 2.5 TABLET, FILM COATED ORAL at 18:14

## 2024-05-12 RX ADMIN — OXYCODONE HYDROCHLORIDE 5 MILLIGRAM(S): 5 TABLET ORAL at 18:58

## 2024-05-12 RX ADMIN — Medication 1: at 17:03

## 2024-05-12 RX ADMIN — Medication 650 MILLIGRAM(S): at 16:20

## 2024-05-12 RX ADMIN — Medication 650 MILLIGRAM(S): at 01:00

## 2024-05-12 RX ADMIN — INSULIN GLARGINE 17 UNIT(S): 100 INJECTION, SOLUTION SUBCUTANEOUS at 22:03

## 2024-05-12 RX ADMIN — Medication 10 UNIT(S): at 08:38

## 2024-05-12 RX ADMIN — Medication 50 MILLIGRAM(S): at 18:15

## 2024-05-12 RX ADMIN — Medication 50 MILLIGRAM(S): at 05:15

## 2024-05-12 RX ADMIN — Medication 650 MILLIGRAM(S): at 15:20

## 2024-05-12 RX ADMIN — CLOPIDOGREL BISULFATE 75 MILLIGRAM(S): 75 TABLET, FILM COATED ORAL at 13:58

## 2024-05-12 RX ADMIN — Medication 20 MILLIEQUIVALENT(S): at 09:51

## 2024-05-12 RX ADMIN — Medication 40 MILLIGRAM(S): at 13:56

## 2024-05-12 RX ADMIN — Medication 40 MILLIGRAM(S): at 08:40

## 2024-05-12 RX ADMIN — Medication 1 APPLICATION(S): at 13:56

## 2024-05-12 RX ADMIN — CHLORHEXIDINE GLUCONATE 1 APPLICATION(S): 213 SOLUTION TOPICAL at 13:53

## 2024-05-12 RX ADMIN — Medication 650 MILLIGRAM(S): at 06:20

## 2024-05-12 RX ADMIN — ISOSORBIDE DINITRATE 30 MILLIGRAM(S): 5 TABLET ORAL at 05:15

## 2024-05-12 RX ADMIN — Medication 20 MILLIEQUIVALENT(S): at 12:51

## 2024-05-12 RX ADMIN — Medication 650 MILLIGRAM(S): at 08:00

## 2024-05-12 RX ADMIN — Medication 8 UNIT(S): at 11:58

## 2024-05-12 RX ADMIN — Medication 0: at 22:03

## 2024-05-12 RX ADMIN — Medication 650 MILLIGRAM(S): at 00:25

## 2024-05-12 RX ADMIN — Medication 8 UNIT(S): at 17:02

## 2024-05-12 RX ADMIN — ISOSORBIDE DINITRATE 30 MILLIGRAM(S): 5 TABLET ORAL at 13:55

## 2024-05-12 RX ADMIN — ISOSORBIDE DINITRATE 30 MILLIGRAM(S): 5 TABLET ORAL at 18:15

## 2024-05-12 NOTE — PROGRESS NOTE ADULT - PROBLEM SELECTOR PLAN 1
Will continue current insulin regimen for now. Will continue monitoring  blood sugars, will Follow up.  Patient counseled for compliance with consistent low carb diet.    Insulins to be adjusted pending steroid taper  DC on home Semglee , current dose  Stop home Farxiga altogther and any SGLT2i due to recent BARRY/rhabdo  OP follow up

## 2024-05-12 NOTE — PROGRESS NOTE ADULT - NS ATTEND AMEND GEN_ALL_CORE FT
Chart, labs, vitals, radiology reviewed. Above H&P reviewed and edited where appropriate. Agree with history and physical exam. Agree with assessment and plan. I reviewed the overnight course of events and discussed the care with the patient/ family.  All the decisions in assessment and plan are made by me.

## 2024-05-12 NOTE — CHART NOTE - NSCHARTNOTEFT_GEN_A_CORE
Patient complaining of "worse pain" at biopsy site (right posterior thigh)   Seen and examined in chair at bedside  - Hematoma from right buttock to right popliteal area,   - Bilat legs very edematous. Pt reports "neuropathy" so he is unable to feel if his feet / legs feel "numb"   - toes warm w/ capillary refill. +pulse  - Reports pain "at site of incision only" able to lift both feet / extend - weakly bilaterally.  - No obvious signs of infection    Vital Signs Last 24 Hrs  T(C): 36.7 (12 May 2024 11:35), Max: 36.7 (11 May 2024 17:23)  T(F): 98 (12 May 2024 11:35), Max: 98 (11 May 2024 17:23)  HR: 73 (12 May 2024 11:35) (69 - 98)  BP: 130/56 (12 May 2024 11:35) (110/64 - 130/56)  BP(mean): --  RR: 19 (12 May 2024 11:35) (18 - 19)  SpO2: 100% (12 May 2024 11:35) (97% - 100%)      Hgb / HCT sent STAT:                        8.8    x     )-----------( x        ( 12 May 2024 12:37 )             28.3     Previous Hgb (5/10/24) = 8.7    Dr Mcdaniel updated on above - recommending Oxy IR 5mg (has received this hospital admission without adverse effect)  Will ask RN to continue to monitor for relief as well as neurovascular changes.    Patient pending Auth for FERNAND[facility] at [place]

## 2024-05-12 NOTE — PROGRESS NOTE ADULT - SUBJECTIVE AND OBJECTIVE BOX
Chief complaint  Patient is a 69y old  Male who presents with a chief complaint of Rhabdo (12 May 2024 10:08)         Labs and Fingersticks  CAPILLARY BLOOD GLUCOSE      POCT Blood Glucose.: 129 mg/dL (12 May 2024 11:15)  POCT Blood Glucose.: 98 mg/dL (12 May 2024 08:00)  POCT Blood Glucose.: 269 mg/dL (11 May 2024 21:08)  POCT Blood Glucose.: 257 mg/dL (11 May 2024 16:33)      Anion Gap: 12 (05-12 @ 06:36)  Anion Gap: 10 (05-11 @ 05:52)      Calcium: 8.7 (05-12 @ 06:36)  Calcium: 8.1 *L* (05-11 @ 05:52)          05-12    138  |  98  |  58<H>  ----------------------------<  120<H>  3.2<L>   |  28  |  1.46<H>    Ca    8.7      12 May 2024 06:36  Mg     2.3     05-11      Medications  MEDICATIONS  (STANDING):  apixaban 2.5 milliGRAM(s) Oral every 12 hours  chlorhexidine 2% Cloths 1 Application(s) Topical daily  clopidogrel Tablet 75 milliGRAM(s) Oral daily  dextrose 10% Bolus 125 milliLiter(s) IV Bolus once  dextrose 5%. 1000 milliLiter(s) (50 mL/Hr) IV Continuous <Continuous>  dextrose 5%. 1000 milliLiter(s) (100 mL/Hr) IV Continuous <Continuous>  dextrose 50% Injectable 12.5 Gram(s) IV Push once  dextrose 50% Injectable 25 Gram(s) IV Push once  furosemide    Tablet 40 milliGRAM(s) Oral two times a day  glucagon  Injectable 1 milliGRAM(s) IntraMuscular once  influenza  Vaccine (HIGH DOSE) 0.7 milliLiter(s) IntraMuscular once  insulin glargine Injectable (LANTUS) 17 Unit(s) SubCutaneous at bedtime  insulin lispro (ADMELOG) corrective regimen sliding scale   SubCutaneous at bedtime  insulin lispro (ADMELOG) corrective regimen sliding scale   SubCutaneous three times a day before meals  insulin lispro Injectable (ADMELOG) 8 Unit(s) SubCutaneous three times a day before meals  isosorbide   dinitrate Tablet (ISORDIL) 30 milliGRAM(s) Oral three times a day  methylPREDNISolone 16 milliGRAM(s) Oral every 24 hours  metoprolol tartrate 50 milliGRAM(s) Oral two times a day  polyethylene glycol 3350 17 Gram(s) Oral two times a day  potassium chloride    Tablet ER 20 milliEquivalent(s) Oral every 2 hours  senna 2 Tablet(s) Oral at bedtime  silver sulfADIAZINE 1% Cream 1 Application(s) Topical daily      Physical Exam  General: Patient comfortable in bed   Vital Signs Last 12 Hrs  T(F): 98 (05-12-24 @ 11:35), Max: 98 (05-12-24 @ 11:35)  HR: 73 (05-12-24 @ 11:35) (69 - 74)  BP: 130/56 (05-12-24 @ 11:35) (117/65 - 130/56)  BP(mean): --  RR: 19 (05-12-24 @ 11:35) (18 - 19)  SpO2: 100% (05-12-24 @ 11:35) (100% - 100%)    CVS: S1S2   Respiratory: No wheezing, no crepitations  GI: Abdomen soft, bowel sounds positive  Musculoskeletal:  moves all extremities         Chief complaint  Patient is a 69y old  Male who presents with a chief complaint of Rhabdo (12 May 2024 10:08)         Labs and Fingersticks  CAPILLARY BLOOD GLUCOSE      POCT Blood Glucose.: 129 mg/dL (12 May 2024 11:15)  POCT Blood Glucose.: 98 mg/dL (12 May 2024 08:00)  POCT Blood Glucose.: 269 mg/dL (11 May 2024 21:08)  POCT Blood Glucose.: 257 mg/dL (11 May 2024 16:33)      Anion Gap: 12 (05-12 @ 06:36)  Anion Gap: 10 (05-11 @ 05:52)      Calcium: 8.7 (05-12 @ 06:36)  Calcium: 8.1 *L* (05-11 @ 05:52)          05-12    138  |  98  |  58<H>  ----------------------------<  120<H>  3.2<L>   |  28  |  1.46<H>    Ca    8.7      12 May 2024 06:36  Mg     2.3     05-11      Medications  MEDICATIONS  (STANDING):  apixaban 2.5 milliGRAM(s) Oral every 12 hours  chlorhexidine 2% Cloths 1 Application(s) Topical daily  clopidogrel Tablet 75 milliGRAM(s) Oral daily  dextrose 10% Bolus 125 milliLiter(s) IV Bolus once  dextrose 5%. 1000 milliLiter(s) (50 mL/Hr) IV Continuous <Continuous>  dextrose 5%. 1000 milliLiter(s) (100 mL/Hr) IV Continuous <Continuous>  dextrose 50% Injectable 12.5 Gram(s) IV Push once  dextrose 50% Injectable 25 Gram(s) IV Push once  furosemide    Tablet 40 milliGRAM(s) Oral two times a day  glucagon  Injectable 1 milliGRAM(s) IntraMuscular once  influenza  Vaccine (HIGH DOSE) 0.7 milliLiter(s) IntraMuscular once  insulin glargine Injectable (LANTUS) 17 Unit(s) SubCutaneous at bedtime  insulin lispro (ADMELOG) corrective regimen sliding scale   SubCutaneous at bedtime  insulin lispro (ADMELOG) corrective regimen sliding scale   SubCutaneous three times a day before meals  insulin lispro Injectable (ADMELOG) 8 Unit(s) SubCutaneous three times a day before meals  isosorbide   dinitrate Tablet (ISORDIL) 30 milliGRAM(s) Oral three times a day  methylPREDNISolone 16 milliGRAM(s) Oral every 24 hours  metoprolol tartrate 50 milliGRAM(s) Oral two times a day  polyethylene glycol 3350 17 Gram(s) Oral two times a day  potassium chloride    Tablet ER 20 milliEquivalent(s) Oral every 2 hours  senna 2 Tablet(s) Oral at bedtime  silver sulfADIAZINE 1% Cream 1 Application(s) Topical daily      Physical Exam  General: Patient comfortable in bed   Vital Signs Last 12 Hrs  T(F): 98 (05-12-24 @ 11:35), Max: 98 (05-12-24 @ 11:35)  HR: 73 (05-12-24 @ 11:35) (69 - 74)  BP: 130/56 (05-12-24 @ 11:35) (117/65 - 130/56)  BP(mean): --  RR: 19 (05-12-24 @ 11:35) (18 - 19)  SpO2: 100% (05-12-24 @ 11:35) (100% - 100%)    CVS: S1S2   Respiratory: No wheezing, no crepitations  GI: Abdomen soft, bowel sounds positive  Musculoskeletal:  moves all extremities

## 2024-05-12 NOTE — PROGRESS NOTE ADULT - ASSESSMENT
70 y/o male with hx of CAD s/p multiple  stents (most recent to pLM (70%), pLAD, mLAD on 1/8/24 with Dr. Dominguez), HFrEF , A-fib, HTN, HLD, T2DM,  CKD3, former smoker  Assessment  DMT2: 69y Male with DM T2 with hyperglycemia, A1C 7.6% , was on oral meds and insulin  at home  (Farxiga and  Semglee basal insulin), now on basal bolus insulins, sugars are fluctuating, now with labile sugars  was on steroids, insulin dose adjusted.  CAD: on medications, stable, monitored.   HTN: on antihypertensive medications, monitored, asymptomatic.  CKD: Monitor labs/BMP, renal follow up. rhabdo, muscle bx      Discussed plan and management with Dr Flo Jamil NP - TEAMS  Myrna Rossi MD  Cell: [phone]  Office: [phone]      68 y/o male with hx of CAD s/p multiple  stents (most recent to pLM (70%), pLAD, mLAD on 1/8/24 with Dr. Dominguez), HFrEF , A-fib, HTN, HLD, T2DM,  CKD3, former smoker  Assessment  DMT2: 69y Male with DM T2 with hyperglycemia, A1C 7.6% , was on oral meds and insulin at  home  (Farxiga and  Semglee basal insulin), now on basal bolus insulins, sugars are fluctuating, now with labile sugars  was on steroids, insulin dose adjusted.  CAD: on medications, stable, monitored.   HTN: on antihypertensive medications, monitored, asymptomatic.  CKD: Monitor labs/BMP, renal follow up. rhabdo, muscle bx      Discussed plan and management with Dr Flo Jamil NP - TEAMS  Myrna Rossi MD  Cell: [phone]  Office: [phone]

## 2024-05-12 NOTE — PROGRESS NOTE ADULT - SUBJECTIVE AND OBJECTIVE BOX
No dyspnea   OOB to chair  Leg edema is minimally improved  /65  HR 69  Lungs clear  Irregular rhythm  2-2+ edema    BUN 58  Crt 1.46      Imp:  Rhabdomyolysis - resolved  Continue to taper PO steroids  Fluid overload essentially unchanged    Renal function is stable  Consider increasing Lasix to 60 mg bid

## 2024-05-12 NOTE — PROGRESS NOTE ADULT - ASSESSMENT
oob to chair  on room air  afebrile  attests to " having pain at the biopsy spot", otherwise  denies any acute complaints    acetaminophen     Tablet .. 650 milliGRAM(s) Oral every 6 hours PRN  apixaban 2.5 milliGRAM(s) Oral every 12 hours  bisacodyl 5 milliGRAM(s) Oral every 12 hours PRN  chlorhexidine 2% Cloths 1 Application(s) Topical daily  clopidogrel Tablet 75 milliGRAM(s) Oral daily  dextrose 10% Bolus 125 milliLiter(s) IV Bolus once  dextrose 5%. 1000 milliLiter(s) IV Continuous <Continuous>  dextrose 5%. 1000 milliLiter(s) IV Continuous <Continuous>  dextrose 50% Injectable 12.5 Gram(s) IV Push once  dextrose 50% Injectable 25 Gram(s) IV Push once  dextrose Oral Gel 15 Gram(s) Oral once PRN  furosemide    Tablet 40 milliGRAM(s) Oral two times a day  glucagon  Injectable 1 milliGRAM(s) IntraMuscular once  influenza  Vaccine (HIGH DOSE) 0.7 milliLiter(s) IntraMuscular once  insulin glargine Injectable (LANTUS) 17 Unit(s) SubCutaneous at bedtime  insulin lispro (ADMELOG) corrective regimen sliding scale   SubCutaneous at bedtime  insulin lispro (ADMELOG) corrective regimen sliding scale   SubCutaneous three times a day before meals  insulin lispro Injectable (ADMELOG) 8 Unit(s) SubCutaneous three times a day before meals  isosorbide   dinitrate Tablet (ISORDIL) 30 milliGRAM(s) Oral three times a day  methylPREDNISolone 16 milliGRAM(s) Oral every 24 hours  metoprolol tartrate 50 milliGRAM(s) Oral two times a day  polyethylene glycol 3350 17 Gram(s) Oral two times a day  potassium chloride    Tablet ER 20 milliEquivalent(s) Oral every 2 hours  senna 2 Tablet(s) Oral at bedtime  silver sulfADIAZINE 1% Cream 1 Application(s) Topical daily      VITAL:  T(C): , Max: 36.9 (05-11-24 @ 11:47)  T(F): , Max: 98.5 (05-11-24 @ 11:47)  HR: 69 (05-12-24 @ 04:25)  BP: 117/65 (05-12-24 @ 04:25)  BP(mean): --  RR: 18 (05-12-24 @ 04:25)  SpO2: 100% (05-12-24 @ 04:25)  Wt(kg): --    05-11-24 @ 07:01  -  05-12-24 @ 07:00  --------------------------------------------------------  IN: 540 mL / OUT: 1800 mL / NET: -1260 mL        PHYSICAL EXAM:  Constitutional: alert, NAD  HEENT: NCAT, MMM  Neck: Supple, No JVD  Respiratory: CTA-b/l  Cardiovascular: RRR s1s2, no m/r/g  Gastrointestinal: BS+, soft, NT/ND  Extremities: (+)b/l LE edema/mild tenderness  Neurological: no focal deficits; strength grossly intact  Back: no CVAT b/l  Skin: No rashes, no nevi  LABS:      Na(138)/K(3.2)/Cl(98)/HCO3(28)/BUN(58)/Cr(1.46)Glu(120)/Ca(8.7)/Mg(--)/PO4(--)    05-12 @ 06:36  Na(134)/K(3.5)/Cl(97)/HCO3(27)/BUN(61)/Cr(1.70)Glu(169)/Ca(8.1)/Mg(2.3)/PO4(--)    05-11 @ 05:52  Na(138)/K(4.0)/Cl(99)/HCO3(27)/BUN(61)/Cr(1.42)Glu(141)/Ca(8.6)/Mg(2.3)/PO4(--)    05-10 @ 07:05    Urinalysis Basic - ( 12 May 2024 06:36 )    Color: x / Appearance: x / SG: x / pH: x  Gluc: 120 mg/dL / Ketone: x  / Bili: x / Urobili: x   Blood: x / Protein: x / Nitrite: x   Leuk Esterase: x / RBC: x / WBC x   Sq Epi: x / Non Sq Epi: x / Bacteria: x              IMPRESSION: 69M w/ HTN, DM2, CAD, AFib, HFrEF, and CKD, s/p recent PCI, 4/25/24 returned with acute rhabdomyolysis    (1)Renal - CKD3b - due to diabetes/hypertension - at/near baseline  (2)Hypokalemia- repleting  (3)CV-BP acceptable  (4)Rhabdo - improving, off IVF  (5)Rheum - inflammatory myositis - Surgery and Rheum on board- s/p muscle biopsy 5/2, results pending      RECOMMEND:  (1)Agree with potassium chloride 20mEq po x 3   (2)Meds as ordered/dose new meds for GFR 30-40ml/min  (3)F/U muscle biopsy results      Justin Reynaga NP-BC  [employer], [place]  [phone]

## 2024-05-12 NOTE — PROGRESS NOTE ADULT - SUBJECTIVE AND OBJECTIVE BOX
Date of service: 05-12-24 @ 22:34      Patient is a 69y old  Male who presents with a chief complaint of Rhabdo (12 May 2024 12:49)                                                               INTERVAL HPI/OVERNIGHT EVENTS:    REVIEW OF SYSTEMS:     CONSTITUTIONAL: No weakness, fevers or chills  EYES/ENT: No visual changes , no ear ache   NECK: No pain or stiffness  RESPIRATORY: No cough, wheezing,  No shortness of breath  CARDIOVASCULAR: No chest pain or palpitations  GASTROINTESTINAL: No abdominal pain  . No nausea, vomiting, or hematemesis; No diarrhea or constipation. No melena or hematochezia.  GENITOURINARY: No dysuria, frequency or hematuria  NEUROLOGICAL: No numbness or weakness  SKIN: No itching, burning, rashes, or lesions                                                                                                                                                                                                                                                                                 Medications:  MEDICATIONS  (STANDING):  apixaban 2.5 milliGRAM(s) Oral every 12 hours  chlorhexidine 2% Cloths 1 Application(s) Topical daily  clopidogrel Tablet 75 milliGRAM(s) Oral daily  dextrose 10% Bolus 125 milliLiter(s) IV Bolus once  dextrose 5%. 1000 milliLiter(s) (100 mL/Hr) IV Continuous <Continuous>  dextrose 5%. 1000 milliLiter(s) (50 mL/Hr) IV Continuous <Continuous>  dextrose 50% Injectable 12.5 Gram(s) IV Push once  dextrose 50% Injectable 25 Gram(s) IV Push once  furosemide    Tablet 40 milliGRAM(s) Oral two times a day  glucagon  Injectable 1 milliGRAM(s) IntraMuscular once  influenza  Vaccine (HIGH DOSE) 0.7 milliLiter(s) IntraMuscular once  insulin glargine Injectable (LANTUS) 17 Unit(s) SubCutaneous at bedtime  insulin lispro (ADMELOG) corrective regimen sliding scale   SubCutaneous three times a day before meals  insulin lispro (ADMELOG) corrective regimen sliding scale   SubCutaneous at bedtime  insulin lispro Injectable (ADMELOG) 8 Unit(s) SubCutaneous three times a day before meals  isosorbide   dinitrate Tablet (ISORDIL) 30 milliGRAM(s) Oral three times a day  methylPREDNISolone 16 milliGRAM(s) Oral every 24 hours  metoprolol tartrate 50 milliGRAM(s) Oral two times a day  polyethylene glycol 3350 17 Gram(s) Oral two times a day  senna 2 Tablet(s) Oral at bedtime  silver sulfADIAZINE 1% Cream 1 Application(s) Topical daily    MEDICATIONS  (PRN):  acetaminophen     Tablet .. 650 milliGRAM(s) Oral every 6 hours PRN Temp greater or equal to 38C (100.4F), Mild Pain (1 - 3)  bisacodyl 5 milliGRAM(s) Oral every 12 hours PRN Constipation  dextrose Oral Gel 15 Gram(s) Oral once PRN Blood Glucose LESS THAN 70 milliGRAM(s)/deciliter  oxyCODONE    IR 5 milliGRAM(s) Oral every 6 hours PRN Severe Pain (7 - 10)       Allergies    atorvastatin (Muscle Pain (Severe))    Intolerances      Vital Signs Last 24 Hrs  T(C): 36.8 (12 May 2024 20:44), Max: 36.8 (12 May 2024 20:44)  T(F): 98.2 (12 May 2024 20:44), Max: 98.2 (12 May 2024 20:44)  HR: 75 (12 May 2024 20:44) (69 - 75)  BP: 121/63 (12 May 2024 20:44) (117/65 - 130/56)  BP(mean): --  RR: 18 (12 May 2024 20:44) (18 - 19)  SpO2: 97% (12 May 2024 20:44) (97% - 100%)    Parameters below as of 12 May 2024 20:44  Patient On (Oxygen Delivery Method): room air      CAPILLARY BLOOD GLUCOSE      POCT Blood Glucose.: 225 mg/dL (12 May 2024 21:48)  POCT Blood Glucose.: 183 mg/dL (12 May 2024 16:48)  POCT Blood Glucose.: 129 mg/dL (12 May 2024 11:15)  POCT Blood Glucose.: 98 mg/dL (12 May 2024 08:00)      05-11 @ 07:01  -  05-12 @ 07:00  --------------------------------------------------------  IN: 540 mL / OUT: 1800 mL / NET: -1260 mL    05-12 @ 07:01  -  05-12 @ 22:34  --------------------------------------------------------  IN: 1020 mL / OUT: 1150 mL / NET: -130 mL      Physical Exam:    Daily     Daily   General:  Well appearing, NAD, not cachetic  HEENT:  Nonicteric, PERRLA  CV:  RRR, S1S2   Lungs:  CTA B/L, no wheezes, rales, rhonchi  Abdomen:  Soft, non-tender, no distended, positive BS  Extremities:   edema                                                                                                                                                                                                                                                                                               LABS:                               8.8    x     )-----------( x        ( 12 May 2024 12:37 )             28.3                      05-12    138  |  98  |  58<H>  ----------------------------<  120<H>  3.2<L>   |  28  |  1.46<H>    Ca    8.7      12 May 2024 06:36  Mg     2.3     05-11                         RADIOLOGY & ADDITIONAL TESTS         I personally reviewed: [  ]EKG   [  ]CXR    [  ] CT      A/P:         Discussed with :     Derek consultants' Notes   Time spent :

## 2024-05-13 LAB
ANION GAP SERPL CALC-SCNC: 9 MMOL/L — SIGNIFICANT CHANGE UP (ref 5–17)
BLD GP AB SCN SERPL QL: POSITIVE — SIGNIFICANT CHANGE UP
BUN SERPL-MCNC: 61 MG/DL — HIGH (ref 7–23)
CALCIUM SERPL-MCNC: 8.8 MG/DL — SIGNIFICANT CHANGE UP (ref 8.4–10.5)
CHLORIDE SERPL-SCNC: 99 MMOL/L — SIGNIFICANT CHANGE UP (ref 96–108)
CK SERPL-CCNC: 383 U/L — HIGH (ref 30–200)
CO2 SERPL-SCNC: 28 MMOL/L — SIGNIFICANT CHANGE UP (ref 22–31)
CREAT SERPL-MCNC: 1.49 MG/DL — HIGH (ref 0.5–1.3)
EGFR: 51 ML/MIN/1.73M2 — LOW
GLUCOSE BLDC GLUCOMTR-MCNC: 143 MG/DL — HIGH (ref 70–99)
GLUCOSE BLDC GLUCOMTR-MCNC: 145 MG/DL — HIGH (ref 70–99)
GLUCOSE BLDC GLUCOMTR-MCNC: 183 MG/DL — HIGH (ref 70–99)
GLUCOSE BLDC GLUCOMTR-MCNC: 215 MG/DL — HIGH (ref 70–99)
GLUCOSE SERPL-MCNC: 194 MG/DL — HIGH (ref 70–99)
HCT VFR BLD CALC: 23.9 % — LOW (ref 39–50)
HCT VFR BLD CALC: 24.5 % — LOW (ref 39–50)
HGB BLD-MCNC: 7.6 G/DL — LOW (ref 13–17)
HGB BLD-MCNC: 7.8 G/DL — LOW (ref 13–17)
MAGNESIUM SERPL-MCNC: 2.4 MG/DL — SIGNIFICANT CHANGE UP (ref 1.6–2.6)
MCHC RBC-ENTMCNC: 28.7 PG — SIGNIFICANT CHANGE UP (ref 27–34)
MCHC RBC-ENTMCNC: 29.5 PG — SIGNIFICANT CHANGE UP (ref 27–34)
MCHC RBC-ENTMCNC: 31.8 GM/DL — LOW (ref 32–36)
MCHC RBC-ENTMCNC: 31.8 GM/DL — LOW (ref 32–36)
MCV RBC AUTO: 90.1 FL — SIGNIFICANT CHANGE UP (ref 80–100)
MCV RBC AUTO: 92.6 FL — SIGNIFICANT CHANGE UP (ref 80–100)
NRBC # BLD: 0 /100 WBCS — SIGNIFICANT CHANGE UP (ref 0–0)
NRBC # BLD: 0 /100 WBCS — SIGNIFICANT CHANGE UP (ref 0–0)
PLATELET # BLD AUTO: 294 K/UL — SIGNIFICANT CHANGE UP (ref 150–400)
PLATELET # BLD AUTO: 324 K/UL — SIGNIFICANT CHANGE UP (ref 150–400)
POTASSIUM SERPL-MCNC: 4.1 MMOL/L — SIGNIFICANT CHANGE UP (ref 3.5–5.3)
POTASSIUM SERPL-SCNC: 4.1 MMOL/L — SIGNIFICANT CHANGE UP (ref 3.5–5.3)
RBC # BLD: 2.58 M/UL — LOW (ref 4.2–5.8)
RBC # BLD: 2.72 M/UL — LOW (ref 4.2–5.8)
RBC # FLD: 19.4 % — HIGH (ref 10.3–14.5)
RBC # FLD: 19.7 % — HIGH (ref 10.3–14.5)
RH IG SCN BLD-IMP: POSITIVE — SIGNIFICANT CHANGE UP
SODIUM SERPL-SCNC: 136 MMOL/L — SIGNIFICANT CHANGE UP (ref 135–145)
SURGICAL PATHOLOGY STUDY: SIGNIFICANT CHANGE UP
WBC # BLD: 17.88 K/UL — HIGH (ref 3.8–10.5)
WBC # BLD: 22.79 K/UL — HIGH (ref 3.8–10.5)
WBC # FLD AUTO: 17.88 K/UL — HIGH (ref 3.8–10.5)
WBC # FLD AUTO: 22.79 K/UL — HIGH (ref 3.8–10.5)

## 2024-05-13 PROCEDURE — 76882 US LMTD JT/FCL EVL NVASC XTR: CPT | Mod: 26,RT

## 2024-05-13 RX ADMIN — Medication 50 MILLIGRAM(S): at 06:23

## 2024-05-13 RX ADMIN — Medication 1: at 17:16

## 2024-05-13 RX ADMIN — OXYCODONE HYDROCHLORIDE 5 MILLIGRAM(S): 5 TABLET ORAL at 01:30

## 2024-05-13 RX ADMIN — Medication 16 MILLIGRAM(S): at 09:07

## 2024-05-13 RX ADMIN — Medication 8 UNIT(S): at 08:16

## 2024-05-13 RX ADMIN — Medication 8 UNIT(S): at 13:39

## 2024-05-13 RX ADMIN — INSULIN GLARGINE 17 UNIT(S): 100 INJECTION, SOLUTION SUBCUTANEOUS at 21:53

## 2024-05-13 RX ADMIN — APIXABAN 2.5 MILLIGRAM(S): 2.5 TABLET, FILM COATED ORAL at 17:17

## 2024-05-13 RX ADMIN — OXYCODONE HYDROCHLORIDE 5 MILLIGRAM(S): 5 TABLET ORAL at 07:00

## 2024-05-13 RX ADMIN — OXYCODONE HYDROCHLORIDE 5 MILLIGRAM(S): 5 TABLET ORAL at 15:14

## 2024-05-13 RX ADMIN — ISOSORBIDE DINITRATE 30 MILLIGRAM(S): 5 TABLET ORAL at 17:17

## 2024-05-13 RX ADMIN — CLOPIDOGREL BISULFATE 75 MILLIGRAM(S): 75 TABLET, FILM COATED ORAL at 12:31

## 2024-05-13 RX ADMIN — CHLORHEXIDINE GLUCONATE 1 APPLICATION(S): 213 SOLUTION TOPICAL at 13:35

## 2024-05-13 RX ADMIN — OXYCODONE HYDROCHLORIDE 5 MILLIGRAM(S): 5 TABLET ORAL at 00:39

## 2024-05-13 RX ADMIN — Medication 8 UNIT(S): at 17:16

## 2024-05-13 RX ADMIN — OXYCODONE HYDROCHLORIDE 5 MILLIGRAM(S): 5 TABLET ORAL at 06:19

## 2024-05-13 RX ADMIN — Medication 50 MILLIGRAM(S): at 17:17

## 2024-05-13 RX ADMIN — OXYCODONE HYDROCHLORIDE 5 MILLIGRAM(S): 5 TABLET ORAL at 23:44

## 2024-05-13 RX ADMIN — Medication 1 APPLICATION(S): at 13:42

## 2024-05-13 RX ADMIN — ISOSORBIDE DINITRATE 30 MILLIGRAM(S): 5 TABLET ORAL at 06:22

## 2024-05-13 RX ADMIN — Medication 40 MILLIGRAM(S): at 13:36

## 2024-05-13 RX ADMIN — Medication 40 MILLIGRAM(S): at 06:24

## 2024-05-13 RX ADMIN — OXYCODONE HYDROCHLORIDE 5 MILLIGRAM(S): 5 TABLET ORAL at 16:14

## 2024-05-13 RX ADMIN — ISOSORBIDE DINITRATE 30 MILLIGRAM(S): 5 TABLET ORAL at 12:31

## 2024-05-13 RX ADMIN — APIXABAN 2.5 MILLIGRAM(S): 2.5 TABLET, FILM COATED ORAL at 06:21

## 2024-05-13 NOTE — PROVIDER CONTACT NOTE (OTHER) - SITUATION
5/10 midsternal non-radiating chest pain on exertion
Notified by Oesia that patient had 7 beats of WCT
pt had 24 beats WCT on tele
Tele event: 10 bts WCT
patient had 9 beats WCT on tele
Patient states "I have not had a BM for 1 week"
tele tech informed that patient had 10BWCT HR 150S for 3.6seconds
Tele tech notified 10 bts wct, 150 bpm, 4.04 sec
Pt c/o of chest pain 7/10

## 2024-05-13 NOTE — PROVIDER CONTACT NOTE (OTHER) - RECOMMENDATIONS
Notify provider
EKG?
Notify provider. EKG
PETEY Marin notified  EKG?
provider made aware, will continue to monitor
notify provider.
Provider notified.
notify provider

## 2024-05-13 NOTE — PROVIDER CONTACT NOTE (OTHER) - NAME OF MD/NP/PA/DO NOTIFIED:
Carmella Noriega
LEANN Mcfarland
Minna Mcmillan
PETEY Lara
Maame WONG
jin shields(garcia)
PETEY Clifford
Hawa, ANP
PETEY Clifford
JUDITH Herron

## 2024-05-13 NOTE — PROGRESS NOTE ADULT - ASSESSMENT
70 y/o man      h/o severe CAD S/P multiple PCI's including multiple LM and LAD stents.       +A-fib, HTN, HLD, T2DM, CKD3, former smoker     presents with  weaknss/   leg  pains.  no leg  weakness      + troponin /  elevated  cpk      CAD,  has   10  stents/  HTN/  HLD        cath,   4/18,  2.0 mm caliber TITO to distal LAD,Dr ILDA Dominguez        on  dapt/  ef   47     troponin,  likely   demand  ischemia.  not  mi  per  dr arellano,,  not a  candidate   for  further intervention,   small  caliber  vessels   c/c  Afib,  on eliquis         elevated  CPK,    suspect  statin induced.  rhabdomyolysis/  form drug vs  polymyositis,    need  to  r/o  polymyositis, vs  drug induced   myositis,    mri  pelvis,  myositis/  fx  femoral condyle.  ortho  eval  noted,  rec immobilizer  pt refusing    ct a/p, neck, no  malignancy   await   bx   by   surg DR. palomino/  on iv  steroid  per   rheum   cont off statin   fu BX   follow  path  monitor  MACARENA   cont steroids ..   appreciate rheum input : -given patients clinical improvement and downtrending CK, as well as hyperglycemia and leg swelling, will favor to taper off medrol  -would c/w following taper: medrol 24mg (for 2 days), then medrol 16mg (for 2 days), medrol 12mg (for 2 days), medrol 8mg (for 2 days), then medrol 4mg (for 2 days), then stop  -please monitor CK and muscle strength at rehab  -can follow up muscle biopsy, myomarker panel, SRP ab as outpatient   -can follow up with Rheumatologist Dr Tasha Pelletier as outpatient within 2 weeks of hospital discharge - please ensure that it is coordinated by the patients rehab and rheumatology office        afib restarted eliquis     renal failure : monitor       elevated lfts,   abd  u/s,  normal liver       PAD/  will  f/p  a s an out pt with vascular. when stable   on asa/  eliquis, plavix, / lasix, imdur.  valsartan.  toprol      hold   Farxiga   and  valsartan for  now,  iv fluids,  per  renal   c/c  anemia            CAD : stent/on  4/17/24.   only   option , to   temporarily hold  eliquis  and   continue   plavix,   and   card  dr bravo and  dr brijesh dominguez in  agreement    wbc  noted, from steroid,  cpk/  lfts decreasing     edema : dc ivf   increase lasix .. will give one extra dose today   and change to po upon dc       Electrolyte abn : replete potassium aaggressively and repeat K and mag   rehab       acute blood loss anemia : tranfsue one unit   monitor H/h   \repeat US       discussed at length with acp and pt  and with CM   dc planning                    -

## 2024-05-13 NOTE — PROGRESS NOTE ADULT - PROBLEM SELECTOR PLAN 1
Will continue current insulin regimen for now. Will continue monitoring  blood sugars, will Follow up.  Patient counseled for compliance with consistent low carb diet.    Insulins to be adjusted pending steroid taper  DC on home Semglee , current dose  Stop home Farxiga altogther and any SGLT2i due to recent BARRY/rhabdo  OP follow up Will continue current insulin regimen for now. Will continue monitoring  blood sugars, will Follow up.  Patient counseled for compliance with consistent low carb diet.  Now on steroid taper  DC on home Semglee , current dose  Stop home Farxiga altogther and any SGLT2i due to recent BARRY/rhabdo  OP follow up

## 2024-05-13 NOTE — PROVIDER CONTACT NOTE (OTHER) - ASSESSMENT
A&O x4, VSS, no chest pain, no SOB, no palpitations, no dizziness.
Patient AOx4, VSS on RA. Patient denies any abdominal pain or discomfort. States "I haven't pooped for a week. I am passing gas but nothing is coming out. Are you able to have the doctor order me something to help?"
pt. with no c/o SOB. Pt. c/o having constipation. V/s B/p 183/98, HR 94 a fib, RR 18, POX R/A 96%, Temp 97.8.
vss, pt denies cp, sob, symptomatic arrhythmias, discomfort. pt sitting up in chair during event.
A&Ox4, sleeping during event. Denies sob, difficulty breathing, chest pain. No complaints of pain or discomfort. VSS.
Pt AAOx4. VSS on RA; denies SOB, no acute events noted on tele. C/o of chest pain.
patient asymptomatic
Patient AOx4, VSS on RA. Patient had stood up from chair to use the urinal and stated after sitting down he was having "chest pain, non-radiating". He states that this pain is not new and started prior to admission but this pain was worse. Patient states "can you ask the provider for nitro, that usually helps."
Pt A&Ox4. No complaints or indicators of chest pain, palpitations, SOB, headache, or dizziness. Patient VSS.
pt a/ox4,vitals taken

## 2024-05-13 NOTE — PROVIDER CONTACT NOTE (OTHER) - DATE AND TIME:
10-May-2024 23:03
29-Apr-2024 05:55
04-May-2024 16:50
05-May-2024 18:45
10-May-2024 10:15
13-May-2024 19:06
29-Apr-2024 11:00
03-May-2024 22:14
13-May-2024 04:22
28-Apr-2024 06:41

## 2024-05-13 NOTE — PROGRESS NOTE ADULT - ASSESSMENT
70 y/o male with hx of CAD s/p multiple  stents (most recent to pLM (70%), pLAD, mLAD on 1/8/24 with Dr. Dominguez), HFrEF , A-fib, HTN, HLD, T2DM,  CKD3, former smoker  Assessment  DMT2: 69y Male with DM T2 with hyperglycemia, A1C 7.6% , was on oral meds and insulin at  home  (Farxiga and  Semglee basal insulin), now on basal bolus insulins, sugars are fluctuating, now improving.   was on steroids, insulin dose adjusted.  CAD: on medications, stable, monitored.   HTN: on antihypertensive medications, monitored, asymptomatic.  CKD: Monitor labs/BMP, renal follow up. rhabdo, muscle bx      Discussed plan and management with Dr Flo Jamil NP - TEAMS  Myrna Rossi MD  Cell: [phone]  Office: [phone]

## 2024-05-13 NOTE — PROGRESS NOTE ADULT - SUBJECTIVE AND OBJECTIVE BOX
Overnight events noted      VITAL:  T(C): , Max: 36.8 (05-12-24 @ 20:44)  T(F): , Max: 98.3 (05-13-24 @ 04:20)  HR: 77 (05-13-24 @ 04:20)  BP: 115/60 (05-13-24 @ 04:20)  RR: 17 (05-13-24 @ 04:20)  SpO2: 98% (05-13-24 @ 04:20)      PHYSICAL EXAM:  Constitutional: alert, NAD  HEENT: NCAT, MMM  Neck: Supple, No JVD  Respiratory: CTA-b/l  Cardiovascular: RRR s1s2, no m/r/g  Gastrointestinal: BS+, soft, NT/ND  Extremities: (+)b/l LE edema/mild tenderness  Neurological: no focal deficits; strength grossly intact  Back: no CVAT b/l  Skin: No rashes, no nevi    LABS:                        7.8    17.88 )-----------( 294      ( 13 May 2024 04:55 )             24.5     Na(136)/K(4.1)/Cl(99)/HCO3(28)/BUN(61)/Cr(1.49)Glu(194)/Ca(8.8)/Mg(2.4)/PO4(--)    05-13 @ 04:55  Na(138)/K(3.2)/Cl(98)/HCO3(28)/BUN(58)/Cr(1.46)Glu(120)/Ca(8.7)/Mg(--)/PO4(--)    05-12 @ 06:36  Na(134)/K(3.5)/Cl(97)/HCO3(27)/BUN(61)/Cr(1.70)Glu(169)/Ca(8.1)/Mg(2.3)/PO4(--)    05-11 @ 05:52          IMPRESSION: 69M w/ HTN, DM2, CAD, AFib, HFrEF, and CKD, s/p recent PCI, 4/25/24 returned with acute rhabdomyolysis    (1)Renal - CKD3b - due to diabetes/hypertension - at/near baseline  (2)Rhabdo - resolved  (3)Lytes - acceptable  (4)Rheum - inflammatory myositis - Surgery and Rheum on board- s/p muscle biopsy 5/2, results pending      RECOMMEND:  (1)Meds as ordered/dose new meds for GFR 30-40ml/min  (2)F/U muscle biopsy results  (3)Can stop checking daily CPK at this point            Markell Walton MD  [facility]  Office/on call physician: [phone]  Cell (7a-7p): [phone]       no complaints      VITAL:  T(C): , Max: 36.8 (05-12-24 @ 20:44)  T(F): , Max: 98.3 (05-13-24 @ 04:20)  HR: 77 (05-13-24 @ 04:20)  BP: 115/60 (05-13-24 @ 04:20)  RR: 17 (05-13-24 @ 04:20)  SpO2: 98% (05-13-24 @ 04:20)      PHYSICAL EXAM:  Constitutional: alert, NAD  HEENT: NCAT, DMM  Neck: Supple, No JVD  Respiratory: CTA-b/l  Cardiovascular: RRR s1s2, no m/r/g  Gastrointestinal: BS+, soft, NT/ND  Extremities: (+)b/l LE edema/mild tenderness  Neurological: no focal deficits; strength grossly intact  Back: no CVAT b/l  Skin: No rashes, no nevi    LABS:                        7.8    17.88 )-----------( 294      ( 13 May 2024 04:55 )             24.5     Na(136)/K(4.1)/Cl(99)/HCO3(28)/BUN(61)/Cr(1.49)Glu(194)/Ca(8.8)/Mg(2.4)/PO4(--)    05-13 @ 04:55  Na(138)/K(3.2)/Cl(98)/HCO3(28)/BUN(58)/Cr(1.46)Glu(120)/Ca(8.7)/Mg(--)/PO4(--)    05-12 @ 06:36  Na(134)/K(3.5)/Cl(97)/HCO3(27)/BUN(61)/Cr(1.70)Glu(169)/Ca(8.1)/Mg(2.3)/PO4(--)    05-11 @ 05:52          IMPRESSION: 69M w/ HTN, DM2, CAD, AFib, HFrEF, and CKD, s/p recent PCI, 4/25/24 returned with acute rhabdomyolysis    (1)Renal - CKD3b - due to diabetes/hypertension - at/near baseline  (2)Rhabdo - resolved  (3)Lytes - acceptable  (4)Rheum - inflammatory myositis - Surgery and Rheum on board- s/p muscle biopsy 5/2, results pending      RECOMMEND:  (1)Meds as ordered/dose new meds for GFR 30-40ml/min  (2)F/U muscle biopsy results  (3)Can stop checking daily CPK at this point            Markell Walton MD  [facility]  Office/on call physician: [phone]  Cell (7a-7p): [phone]

## 2024-05-13 NOTE — PROVIDER CONTACT NOTE (OTHER) - BACKGROUND
Pt. says, pt. takes Nitro SL at home for chest pain.  Pt. Refused Miralax po today in the afternoon.
Rhabdomyolysis
pt adm with rhabdomyolysis,hx afib,ckd
Rhabdomylosis
Admitted for rhabdomyolysis. Has history of WCT.
Admitted DX Rhabdomyolysis. / PMHx of CAD s/p 10 stents (most recently 1 wk ago,), HFrEF w/ EF of 50%, Afib on triple therapy w/ ASA, Plavix and Eliquis, HTN, HLD, T2DM.
Patient is 70 y/o male admitted for rhabdomylosis. Pmhx includes CAD status stents, a-fib, HTN, HLD, DMT2, and CKD.
Pt admitted for Rhabdomyolysis
ADM DX :rhabdomylosis
Patient is 68 y/o male admitted for rhabdomylosis. Pmhx includes CAD s/p stents (last 2 weeks ago), afib, HTN, HLD< T2DM, and CKD.

## 2024-05-13 NOTE — PROGRESS NOTE ADULT - SUBJECTIVE AND OBJECTIVE BOX
Chief complaint  Patient is a 69y old  Male who presents with a chief complaint of Rhabdo (13 May 2024 14:48)         Labs and Fingersticks  CAPILLARY BLOOD GLUCOSE      POCT Blood Glucose.: 143 mg/dL (13 May 2024 12:18)  POCT Blood Glucose.: 145 mg/dL (13 May 2024 08:02)  POCT Blood Glucose.: 225 mg/dL (12 May 2024 21:48)  POCT Blood Glucose.: 183 mg/dL (12 May 2024 16:48)      Anion Gap: 9 (05-13 @ 04:55)  Anion Gap: 12 (05-12 @ 06:36)      Calcium: 8.8 (05-13 @ 04:55)  Calcium: 8.7 (05-12 @ 06:36)          05-13    136  |  99  |  61<H>  ----------------------------<  194<H>  4.1   |  28  |  1.49<H>    Ca    8.8      13 May 2024 04:55  Mg     2.4     05-13                          7.6    22.79 )-----------( 324      ( 13 May 2024 12:53 )             23.9     Medications  MEDICATIONS  (STANDING):  apixaban 2.5 milliGRAM(s) Oral every 12 hours  chlorhexidine 2% Cloths 1 Application(s) Topical daily  clopidogrel Tablet 75 milliGRAM(s) Oral daily  dextrose 10% Bolus 125 milliLiter(s) IV Bolus once  dextrose 5%. 1000 milliLiter(s) (100 mL/Hr) IV Continuous <Continuous>  dextrose 5%. 1000 milliLiter(s) (50 mL/Hr) IV Continuous <Continuous>  dextrose 50% Injectable 12.5 Gram(s) IV Push once  dextrose 50% Injectable 25 Gram(s) IV Push once  furosemide    Tablet 40 milliGRAM(s) Oral two times a day  glucagon  Injectable 1 milliGRAM(s) IntraMuscular once  influenza  Vaccine (HIGH DOSE) 0.7 milliLiter(s) IntraMuscular once  insulin glargine Injectable (LANTUS) 17 Unit(s) SubCutaneous at bedtime  insulin lispro (ADMELOG) corrective regimen sliding scale   SubCutaneous three times a day before meals  insulin lispro (ADMELOG) corrective regimen sliding scale   SubCutaneous at bedtime  insulin lispro Injectable (ADMELOG) 8 Unit(s) SubCutaneous three times a day before meals  isosorbide   dinitrate Tablet (ISORDIL) 30 milliGRAM(s) Oral three times a day  metoprolol tartrate 50 milliGRAM(s) Oral two times a day  polyethylene glycol 3350 17 Gram(s) Oral two times a day  senna 2 Tablet(s) Oral at bedtime  silver sulfADIAZINE 1% Cream 1 Application(s) Topical daily      Physical Exam  General: Patient comfortable in bed   Vital Signs Last 12 Hrs  T(F): 98.1 (05-13-24 @ 11:39), Max: 98.3 (05-13-24 @ 04:20)  HR: 88 (05-13-24 @ 11:39) (70 - 88)  BP: 125/71 (05-13-24 @ 11:39) (115/60 - 125/71)  BP(mean): --  RR: 18 (05-13-24 @ 11:39) (17 - 18)  SpO2: 94% (05-13-24 @ 11:39) (94% - 100%)    CVS: S1S2   Respiratory: No wheezing, no crepitations  GI: Abdomen soft, bowel sounds positive  Musculoskeletal:  moves all extremities

## 2024-05-13 NOTE — PROGRESS NOTE ADULT - ASSESSMENT
Imp:  Rhabdomyolysis - resolved  Continue to taper PO steroids  Fluid overload essentially unchanged    Renal function is stable  Consider increasing Lasix to 60 mg bid  Awaiting placement

## 2024-05-13 NOTE — PROGRESS NOTE ADULT - SUBJECTIVE AND OBJECTIVE BOX
Subjective: The patient denies chest pain, shortness of breath, arm pain or jaw pain, dizziness or palpitations.      MEDICATIONS  (STANDING):  apixaban 2.5 milliGRAM(s) Oral every 12 hours  chlorhexidine 2% Cloths 1 Application(s) Topical daily  clopidogrel Tablet 75 milliGRAM(s) Oral daily  dextrose 10% Bolus 125 milliLiter(s) IV Bolus once  dextrose 5%. 1000 milliLiter(s) (100 mL/Hr) IV Continuous <Continuous>  dextrose 5%. 1000 milliLiter(s) (50 mL/Hr) IV Continuous <Continuous>  dextrose 50% Injectable 12.5 Gram(s) IV Push once  dextrose 50% Injectable 25 Gram(s) IV Push once  furosemide    Tablet 40 milliGRAM(s) Oral two times a day  glucagon  Injectable 1 milliGRAM(s) IntraMuscular once  influenza  Vaccine (HIGH DOSE) 0.7 milliLiter(s) IntraMuscular once  insulin glargine Injectable (LANTUS) 17 Unit(s) SubCutaneous at bedtime  insulin lispro (ADMELOG) corrective regimen sliding scale   SubCutaneous three times a day before meals  insulin lispro (ADMELOG) corrective regimen sliding scale   SubCutaneous at bedtime  insulin lispro Injectable (ADMELOG) 8 Unit(s) SubCutaneous three times a day before meals  isosorbide   dinitrate Tablet (ISORDIL) 30 milliGRAM(s) Oral three times a day  metoprolol tartrate 50 milliGRAM(s) Oral two times a day  polyethylene glycol 3350 17 Gram(s) Oral two times a day  senna 2 Tablet(s) Oral at bedtime  silver sulfADIAZINE 1% Cream 1 Application(s) Topical daily    MEDICATIONS  (PRN):  acetaminophen     Tablet .. 650 milliGRAM(s) Oral every 6 hours PRN Temp greater or equal to 38C (100.4F), Mild Pain (1 - 3)  bisacodyl 5 milliGRAM(s) Oral every 12 hours PRN Constipation  dextrose Oral Gel 15 Gram(s) Oral once PRN Blood Glucose LESS THAN 70 milliGRAM(s)/deciliter  oxyCODONE    IR 5 milliGRAM(s) Oral every 6 hours PRN Severe Pain (7 - 10)      T(F): 98.1 (05-13-24 @ 11:39), Max: 98.3 (05-13-24 @ 04:20)  HR: 88 (05-13-24 @ 11:39) (70 - 88)  BP: 125/71 (05-13-24 @ 11:39) (115/60 - 125/71)  RR: 18 (05-13-24 @ 11:39) (17 - 18)  SpO2: 94% (05-13-24 @ 11:39) (94% - 100%)  Wt(kg): --  ,   I&O's Summary    12 May 2024 07:01  -  13 May 2024 07:00  --------------------------------------------------------  IN: 1020 mL / OUT: 1700 mL / NET: -680 mL    13 May 2024 07:01  -  13 May 2024 11:47  --------------------------------------------------------  IN: 240 mL / OUT: 450 mL / NET: -210 mL          Lungs clear  Irregular rhythm  LE: 2+ edema    Labs:                        7.8    17.88 )-----------( 294      ( 13 May 2024 04:55 )             24.5               05-13    136  |  99  |  61<H>  ----------------------------<  194<H>  4.1   |  28  |  1.49<H>    Ca    8.8      13 May 2024 04:55  Mg     2.4     05-13  CPK  383           CARDIAC MARKERS ( 13 May 2024 04:55 )  x     / x     / 383 U/L / x     / x      CARDIAC MARKERS ( 12 May 2024 06:36 )  x     / x     / 454 U/L / x     / x                  Urinalysis Basic - ( 13 May 2024 04:55 )    Color: x / Appearance: x / SG: x / pH: x  Gluc: 194 mg/dL / Ketone: x  / Bili: x / Urobili: x   Blood: x / Protein: x / Nitrite: x   Leuk Esterase: x / RBC: x / WBC x   Sq Epi: x / Non Sq Epi: x / Bacteria: x

## 2024-05-13 NOTE — PROGRESS NOTE ADULT - SUBJECTIVE AND OBJECTIVE BOX
Date of service: 24 @ 14:48      Patient is a 69y old  Male who presents with a chief complaint of Rhabdo (13 May 2024 11:43)                                                               INTERVAL HPI/OVERNIGHT EVENTS:    REVIEW OF SYSTEMS:     CONSTITUTIONAL: No weakness, fevers or chills  EYES/ENT: No visual changes , no ear ache   NECK: No pain or stiffness  RESPIRATORY: No cough, wheezing,  No shortness of breath  CARDIOVASCULAR: No chest pain or palpitations  GASTROINTESTINAL: No abdominal pain  . No nausea, vomiting, or hematemesis; No diarrhea or constipation. No melena or hematochezia.  GENITOURINARY: No dysuria, frequency or hematuria  NEUROLOGICAL: No numbness or weakness  SKIN: No itching, burning, rashes, or lesions                                                                                                                                                                                                                                                                                 Medications:  MEDICATIONS  (STANDING):  apixaban 2.5 milliGRAM(s) Oral every 12 hours  chlorhexidine 2% Cloths 1 Application(s) Topical daily  clopidogrel Tablet 75 milliGRAM(s) Oral daily  dextrose 10% Bolus 125 milliLiter(s) IV Bolus once  dextrose 5%. 1000 milliLiter(s) (50 mL/Hr) IV Continuous <Continuous>  dextrose 5%. 1000 milliLiter(s) (100 mL/Hr) IV Continuous <Continuous>  dextrose 50% Injectable 12.5 Gram(s) IV Push once  dextrose 50% Injectable 25 Gram(s) IV Push once  furosemide    Tablet 40 milliGRAM(s) Oral two times a day  glucagon  Injectable 1 milliGRAM(s) IntraMuscular once  influenza  Vaccine (HIGH DOSE) 0.7 milliLiter(s) IntraMuscular once  insulin glargine Injectable (LANTUS) 17 Unit(s) SubCutaneous at bedtime  insulin lispro (ADMELOG) corrective regimen sliding scale   SubCutaneous three times a day before meals  insulin lispro (ADMELOG) corrective regimen sliding scale   SubCutaneous at bedtime  insulin lispro Injectable (ADMELOG) 8 Unit(s) SubCutaneous three times a day before meals  isosorbide   dinitrate Tablet (ISORDIL) 30 milliGRAM(s) Oral three times a day  metoprolol tartrate 50 milliGRAM(s) Oral two times a day  polyethylene glycol 3350 17 Gram(s) Oral two times a day  senna 2 Tablet(s) Oral at bedtime  silver sulfADIAZINE 1% Cream 1 Application(s) Topical daily    MEDICATIONS  (PRN):  acetaminophen     Tablet .. 650 milliGRAM(s) Oral every 6 hours PRN Temp greater or equal to 38C (100.4F), Mild Pain (1 - 3)  bisacodyl 5 milliGRAM(s) Oral every 12 hours PRN Constipation  dextrose Oral Gel 15 Gram(s) Oral once PRN Blood Glucose LESS THAN 70 milliGRAM(s)/deciliter  oxyCODONE    IR 5 milliGRAM(s) Oral every 6 hours PRN Severe Pain (7 - 10)       Allergies    atorvastatin (Muscle Pain (Severe))    Intolerances      Vital Signs Last 24 Hrs  T(C): 36.7 (13 May 2024 11:39), Max: 36.8 (12 May 2024 20:44)  T(F): 98.1 (13 May 2024 11:39), Max: 98.3 (13 May 2024 04:20)  HR: 88 (13 May 2024 11:39) (70 - 88)  BP: 125/71 (13 May 2024 11:39) (115/60 - 125/71)  BP(mean): --  RR: 18 (13 May 2024 11:39) (17 - 18)  SpO2: 94% (13 May 2024 11:39) (94% - 100%)    Parameters below as of 13 May 2024 11:39  Patient On (Oxygen Delivery Method): room air      CAPILLARY BLOOD GLUCOSE      POCT Blood Glucose.: 143 mg/dL (13 May 2024 12:18)  POCT Blood Glucose.: 145 mg/dL (13 May 2024 08:02)  POCT Blood Glucose.: 225 mg/dL (12 May 2024 21:48)  POCT Blood Glucose.: 183 mg/dL (12 May 2024 16:48)       @ 07:  -   @ 07:00  --------------------------------------------------------  IN: 1020 mL / OUT: 1700 mL / NET: -680 mL     @ 07:  -  13 @ 14:48  --------------------------------------------------------  IN: 960 mL / OUT: 650 mL / NET: 310 mL      Physical Exam:    Daily     Daily Weight in k.5 (13 May 2024 08:43)  General:  Well appearing, NAD, not cachetic  HEENT:  Nonicteric, PERRLA  CV:  RRR, S1S2   Lungs:  CTA B/L, no wheezes, rales, rhonchi  Abdomen:  Soft, non-tender, no distended, positive BS  Extremities: no edema   Neuro:  AAOx3, non-focal, grossly intact                                                                                                                                                                                                                                                                                                LABS:                               7.6    22.79 )-----------( 324      ( 13 May 2024 12:53 )             23.9                      05    136  |  99  |  61<H>  ----------------------------<  194<H>  4.1   |  28  |  1.49<H>    Ca    8.8      13 May 2024 04:55  Mg     2.4                              RADIOLOGY & ADDITIONAL TESTS         I personally reviewed: [  ]EKG   [  ]CXR    [  ] CT      A/P:         Discussed with :     Derek consultants' Notes   Time spent :

## 2024-05-13 NOTE — PROVIDER CONTACT NOTE (OTHER) - REASON
Tele event: 10 bts WCT
Pt. c/o chest pain while trying to have a BM. Pt. requested for nitro SL
Tele tech notified 10 bts wct, 150 bpm, 4.04 sec
5/10 midsternal non-radiating chest pain on exertion
Patient states "I have not had a BM for 1 week"
Pt c/o of chest pain 7/10
7 beats WCT on tele
pt had 24 beats WCT on tele
tele tech informed that patient had 10BWCT HR 150S for 3.6seconds

## 2024-05-14 ENCOUNTER — TRANSCRIPTION ENCOUNTER (OUTPATIENT)
Age: 69
End: 2024-05-14

## 2024-05-14 ENCOUNTER — NON-APPOINTMENT (OUTPATIENT)
Age: 69
End: 2024-05-14

## 2024-05-14 VITALS
TEMPERATURE: 98 F | DIASTOLIC BLOOD PRESSURE: 65 MMHG | RESPIRATION RATE: 18 BRPM | HEART RATE: 90 BPM | OXYGEN SATURATION: 96 % | SYSTOLIC BLOOD PRESSURE: 160 MMHG

## 2024-05-14 LAB
ANION GAP SERPL CALC-SCNC: 14 MMOL/L — SIGNIFICANT CHANGE UP (ref 5–17)
BUN SERPL-MCNC: 56 MG/DL — HIGH (ref 7–23)
CALCIUM SERPL-MCNC: 8.8 MG/DL — SIGNIFICANT CHANGE UP (ref 8.4–10.5)
CHLORIDE SERPL-SCNC: 99 MMOL/L — SIGNIFICANT CHANGE UP (ref 96–108)
CO2 SERPL-SCNC: 25 MMOL/L — SIGNIFICANT CHANGE UP (ref 22–31)
CREAT SERPL-MCNC: 1.44 MG/DL — HIGH (ref 0.5–1.3)
EGFR: 53 ML/MIN/1.73M2 — LOW
GLUCOSE BLDC GLUCOMTR-MCNC: 199 MG/DL — HIGH (ref 70–99)
GLUCOSE BLDC GLUCOMTR-MCNC: 300 MG/DL — HIGH (ref 70–99)
GLUCOSE BLDC GLUCOMTR-MCNC: 77 MG/DL — SIGNIFICANT CHANGE UP (ref 70–99)
GLUCOSE BLDC GLUCOMTR-MCNC: 88 MG/DL — SIGNIFICANT CHANGE UP (ref 70–99)
GLUCOSE SERPL-MCNC: 81 MG/DL — SIGNIFICANT CHANGE UP (ref 70–99)
HCT VFR BLD CALC: 28.4 % — LOW (ref 39–50)
HCT VFR BLD CALC: 29.2 % — LOW (ref 39–50)
HGB BLD-MCNC: 9.3 G/DL — LOW (ref 13–17)
HGB BLD-MCNC: 9.4 G/DL — LOW (ref 13–17)
MCHC RBC-ENTMCNC: 29.3 PG — SIGNIFICANT CHANGE UP (ref 27–34)
MCHC RBC-ENTMCNC: 32.7 GM/DL — SIGNIFICANT CHANGE UP (ref 32–36)
MCV RBC AUTO: 89.6 FL — SIGNIFICANT CHANGE UP (ref 80–100)
NRBC # BLD: 0 /100 WBCS — SIGNIFICANT CHANGE UP (ref 0–0)
PLATELET # BLD AUTO: 294 K/UL — SIGNIFICANT CHANGE UP (ref 150–400)
POTASSIUM SERPL-MCNC: 3.3 MMOL/L — LOW (ref 3.5–5.3)
POTASSIUM SERPL-SCNC: 3.3 MMOL/L — LOW (ref 3.5–5.3)
RBC # BLD: 3.17 M/UL — LOW (ref 4.2–5.8)
RBC # FLD: 18.8 % — HIGH (ref 10.3–14.5)
SODIUM SERPL-SCNC: 138 MMOL/L — SIGNIFICANT CHANGE UP (ref 135–145)
SRP AB SERPL QL: NEGATIVE — SIGNIFICANT CHANGE UP
WBC # BLD: 18.63 K/UL — HIGH (ref 3.8–10.5)
WBC # FLD AUTO: 18.63 K/UL — HIGH (ref 3.8–10.5)

## 2024-05-14 PROCEDURE — 36415 COLL VENOUS BLD VENIPUNCTURE: CPT

## 2024-05-14 PROCEDURE — 86480 TB TEST CELL IMMUN MEASURE: CPT

## 2024-05-14 PROCEDURE — 97110 THERAPEUTIC EXERCISES: CPT

## 2024-05-14 PROCEDURE — 71045 X-RAY EXAM CHEST 1 VIEW: CPT

## 2024-05-14 PROCEDURE — 86900 BLOOD TYPING SEROLOGIC ABO: CPT

## 2024-05-14 PROCEDURE — 85610 PROTHROMBIN TIME: CPT

## 2024-05-14 PROCEDURE — 84100 ASSAY OF PHOSPHORUS: CPT

## 2024-05-14 PROCEDURE — 88300 SURGICAL PATH GROSS: CPT

## 2024-05-14 PROCEDURE — 86704 HEP B CORE ANTIBODY TOTAL: CPT

## 2024-05-14 PROCEDURE — 87040 BLOOD CULTURE FOR BACTERIA: CPT

## 2024-05-14 PROCEDURE — 93925 LOWER EXTREMITY STUDY: CPT

## 2024-05-14 PROCEDURE — 85027 COMPLETE CBC AUTOMATED: CPT

## 2024-05-14 PROCEDURE — 76882 US LMTD JT/FCL EVL NVASC XTR: CPT

## 2024-05-14 PROCEDURE — 97112 NEUROMUSCULAR REEDUCATION: CPT

## 2024-05-14 PROCEDURE — 86901 BLOOD TYPING SEROLOGIC RH(D): CPT

## 2024-05-14 PROCEDURE — 76700 US EXAM ABDOM COMPLETE: CPT

## 2024-05-14 PROCEDURE — 82962 GLUCOSE BLOOD TEST: CPT

## 2024-05-14 PROCEDURE — 93005 ELECTROCARDIOGRAM TRACING: CPT

## 2024-05-14 PROCEDURE — 86880 COOMBS TEST DIRECT: CPT

## 2024-05-14 PROCEDURE — 85730 THROMBOPLASTIN TIME PARTIAL: CPT

## 2024-05-14 PROCEDURE — 82784 ASSAY IGA/IGD/IGG/IGM EACH: CPT

## 2024-05-14 PROCEDURE — 86334 IMMUNOFIX E-PHORESIS SERUM: CPT

## 2024-05-14 PROCEDURE — 86922 COMPATIBILITY TEST ANTIGLOB: CPT

## 2024-05-14 PROCEDURE — 83735 ASSAY OF MAGNESIUM: CPT

## 2024-05-14 PROCEDURE — 97162 PT EVAL MOD COMPLEX 30 MIN: CPT

## 2024-05-14 PROCEDURE — 85014 HEMATOCRIT: CPT

## 2024-05-14 PROCEDURE — 84166 PROTEIN E-PHORESIS/URINE/CSF: CPT

## 2024-05-14 PROCEDURE — 86706 HEP B SURFACE ANTIBODY: CPT

## 2024-05-14 PROCEDURE — 85025 COMPLETE CBC W/AUTO DIFF WBC: CPT

## 2024-05-14 PROCEDURE — 81001 URINALYSIS AUTO W/SCOPE: CPT

## 2024-05-14 PROCEDURE — 71250 CT THORAX DX C-: CPT | Mod: MC

## 2024-05-14 PROCEDURE — 85652 RBC SED RATE AUTOMATED: CPT

## 2024-05-14 PROCEDURE — 80053 COMPREHEN METABOLIC PANEL: CPT

## 2024-05-14 PROCEDURE — 86850 RBC ANTIBODY SCREEN: CPT

## 2024-05-14 PROCEDURE — 87640 STAPH A DNA AMP PROBE: CPT

## 2024-05-14 PROCEDURE — 84155 ASSAY OF PROTEIN SERUM: CPT

## 2024-05-14 PROCEDURE — 80048 BASIC METABOLIC PNL TOTAL CA: CPT

## 2024-05-14 PROCEDURE — P9016: CPT

## 2024-05-14 PROCEDURE — 86235 NUCLEAR ANTIGEN ANTIBODY: CPT

## 2024-05-14 PROCEDURE — 83880 ASSAY OF NATRIURETIC PEPTIDE: CPT

## 2024-05-14 PROCEDURE — 36430 TRANSFUSION BLD/BLD COMPNT: CPT

## 2024-05-14 PROCEDURE — 99285 EMERGENCY DEPT VISIT HI MDM: CPT

## 2024-05-14 PROCEDURE — 92610 EVALUATE SWALLOWING FUNCTION: CPT

## 2024-05-14 PROCEDURE — G0103: CPT

## 2024-05-14 PROCEDURE — 82550 ASSAY OF CK (CPK): CPT

## 2024-05-14 PROCEDURE — 70490 CT SOFT TISSUE NECK W/O DYE: CPT | Mod: MC

## 2024-05-14 PROCEDURE — 83516 IMMUNOASSAY NONANTIBODY: CPT

## 2024-05-14 PROCEDURE — 84165 PROTEIN E-PHORESIS SERUM: CPT

## 2024-05-14 PROCEDURE — 74176 CT ABD & PELVIS W/O CONTRAST: CPT | Mod: MC

## 2024-05-14 PROCEDURE — 86870 RBC ANTIBODY IDENTIFICATION: CPT

## 2024-05-14 PROCEDURE — 72195 MRI PELVIS W/O DYE: CPT | Mod: MC

## 2024-05-14 PROCEDURE — 85018 HEMOGLOBIN: CPT

## 2024-05-14 PROCEDURE — 87340 HEPATITIS B SURFACE AG IA: CPT

## 2024-05-14 PROCEDURE — 86803 HEPATITIS C AB TEST: CPT

## 2024-05-14 PROCEDURE — 86902 BLOOD TYPE ANTIGEN DONOR EA: CPT

## 2024-05-14 PROCEDURE — 84484 ASSAY OF TROPONIN QUANT: CPT

## 2024-05-14 PROCEDURE — 97116 GAIT TRAINING THERAPY: CPT

## 2024-05-14 PROCEDURE — 86140 C-REACTIVE PROTEIN: CPT

## 2024-05-14 PROCEDURE — 87641 MR-STAPH DNA AMP PROBE: CPT

## 2024-05-14 RX ORDER — OXYCODONE HYDROCHLORIDE 5 MG/1
1 TABLET ORAL
Qty: 0 | Refills: 0 | DISCHARGE
Start: 2024-05-14

## 2024-05-14 RX ORDER — INSULIN GLARGINE 100 [IU]/ML
17 INJECTION, SOLUTION SUBCUTANEOUS
Refills: 0 | DISCHARGE
Start: 2024-05-14

## 2024-05-14 RX ORDER — INSULIN LISPRO 100/ML
1 VIAL (ML) SUBCUTANEOUS
Qty: 0 | Refills: 0 | DISCHARGE
Start: 2024-05-14

## 2024-05-14 RX ORDER — INSULIN GLARGINE 100 [IU]/ML
0 INJECTION, SOLUTION SUBCUTANEOUS
Refills: 0 | DISCHARGE

## 2024-05-14 RX ORDER — POTASSIUM CHLORIDE 20 MEQ
20 PACKET (EA) ORAL
Refills: 0 | Status: COMPLETED | OUTPATIENT
Start: 2024-05-14 | End: 2024-05-14

## 2024-05-14 RX ORDER — INSULIN LISPRO 100/ML
7 VIAL (ML) SUBCUTANEOUS
Qty: 0 | Refills: 0 | DISCHARGE
Start: 2024-05-14

## 2024-05-14 RX ORDER — FUROSEMIDE 40 MG
1 TABLET ORAL
Qty: 0 | Refills: 0 | DISCHARGE
Start: 2024-05-14

## 2024-05-14 RX ORDER — DAPAGLIFLOZIN 10 MG/1
1 TABLET, FILM COATED ORAL
Refills: 0 | DISCHARGE

## 2024-05-14 RX ORDER — VALSARTAN 80 MG/1
1 TABLET ORAL
Qty: 0 | Refills: 0 | DISCHARGE

## 2024-05-14 RX ORDER — INSULIN LISPRO 100/ML
7 VIAL (ML) SUBCUTANEOUS
Refills: 0 | Status: DISCONTINUED | OUTPATIENT
Start: 2024-05-14 | End: 2024-05-14

## 2024-05-14 RX ORDER — INSULIN GLARGINE 100 [IU]/ML
12 INJECTION, SOLUTION SUBCUTANEOUS AT BEDTIME
Refills: 0 | Status: DISCONTINUED | OUTPATIENT
Start: 2024-05-14 | End: 2024-05-14

## 2024-05-14 RX ORDER — FUROSEMIDE 40 MG
1 TABLET ORAL
Refills: 0 | DISCHARGE

## 2024-05-14 RX ADMIN — APIXABAN 2.5 MILLIGRAM(S): 2.5 TABLET, FILM COATED ORAL at 16:51

## 2024-05-14 RX ADMIN — APIXABAN 2.5 MILLIGRAM(S): 2.5 TABLET, FILM COATED ORAL at 06:10

## 2024-05-14 RX ADMIN — Medication 7 UNIT(S): at 12:39

## 2024-05-14 RX ADMIN — Medication 7 UNIT(S): at 16:52

## 2024-05-14 RX ADMIN — OXYCODONE HYDROCHLORIDE 5 MILLIGRAM(S): 5 TABLET ORAL at 06:09

## 2024-05-14 RX ADMIN — ISOSORBIDE DINITRATE 30 MILLIGRAM(S): 5 TABLET ORAL at 16:50

## 2024-05-14 RX ADMIN — OXYCODONE HYDROCHLORIDE 5 MILLIGRAM(S): 5 TABLET ORAL at 06:33

## 2024-05-14 RX ADMIN — Medication 40 MILLIGRAM(S): at 06:10

## 2024-05-14 RX ADMIN — ISOSORBIDE DINITRATE 30 MILLIGRAM(S): 5 TABLET ORAL at 06:09

## 2024-05-14 RX ADMIN — OXYCODONE HYDROCHLORIDE 5 MILLIGRAM(S): 5 TABLET ORAL at 14:30

## 2024-05-14 RX ADMIN — Medication 50 MILLIGRAM(S): at 06:09

## 2024-05-14 RX ADMIN — Medication 20 MILLIEQUIVALENT(S): at 11:44

## 2024-05-14 RX ADMIN — Medication 50 MILLIGRAM(S): at 16:51

## 2024-05-14 RX ADMIN — Medication 40 MILLIGRAM(S): at 13:15

## 2024-05-14 RX ADMIN — ISOSORBIDE DINITRATE 30 MILLIGRAM(S): 5 TABLET ORAL at 12:40

## 2024-05-14 RX ADMIN — OXYCODONE HYDROCHLORIDE 5 MILLIGRAM(S): 5 TABLET ORAL at 13:36

## 2024-05-14 RX ADMIN — Medication 650 MILLIGRAM(S): at 17:00

## 2024-05-14 RX ADMIN — Medication 12 MILLIGRAM(S): at 09:46

## 2024-05-14 RX ADMIN — Medication 3: at 12:38

## 2024-05-14 RX ADMIN — CLOPIDOGREL BISULFATE 75 MILLIGRAM(S): 75 TABLET, FILM COATED ORAL at 11:43

## 2024-05-14 RX ADMIN — OXYCODONE HYDROCHLORIDE 5 MILLIGRAM(S): 5 TABLET ORAL at 00:54

## 2024-05-14 RX ADMIN — Medication 1 APPLICATION(S): at 13:13

## 2024-05-14 RX ADMIN — CHLORHEXIDINE GLUCONATE 1 APPLICATION(S): 213 SOLUTION TOPICAL at 12:39

## 2024-05-14 RX ADMIN — Medication 1: at 16:52

## 2024-05-14 RX ADMIN — Medication 20 MILLIEQUIVALENT(S): at 09:46

## 2024-05-14 RX ADMIN — Medication 650 MILLIGRAM(S): at 16:15

## 2024-05-14 NOTE — PROGRESS NOTE ADULT - REASON FOR ADMISSION
Rhabdo

## 2024-05-14 NOTE — PROGRESS NOTE ADULT - SUBJECTIVE AND OBJECTIVE BOX
SUBJECTIVE: Notes discomfort at biopsy site.   	  MEDICATIONS:  furosemide    Tablet 40 milliGRAM(s) Oral two times a day  isosorbide   dinitrate Tablet (ISORDIL) 30 milliGRAM(s) Oral three times a day  metoprolol tartrate 50 milliGRAM(s) Oral two times a day  acetaminophen     Tablet .. 650 milliGRAM(s) Oral every 6 hours PRN  oxyCODONE    IR 5 milliGRAM(s) Oral every 6 hours PRN  bisacodyl 5 milliGRAM(s) Oral every 12 hours PRN  polyethylene glycol 3350 17 Gram(s) Oral two times a day  senna 2 Tablet(s) Oral at bedtime  dextrose 50% Injectable 12.5 Gram(s) IV Push once  dextrose 50% Injectable 25 Gram(s) IV Push once  dextrose Oral Gel 15 Gram(s) Oral once PRN  glucagon  Injectable 1 milliGRAM(s) IntraMuscular once  insulin glargine Injectable (LANTUS) 17 Unit(s) SubCutaneous at bedtime  insulin lispro (ADMELOG) corrective regimen sliding scale   SubCutaneous at bedtime  insulin lispro (ADMELOG) corrective regimen sliding scale   SubCutaneous three times a day before meals  insulin lispro Injectable (ADMELOG) 7 Unit(s) SubCutaneous three times a day before meals  methylPREDNISolone 12 milliGRAM(s) Oral every 24 hours  apixaban 2.5 milliGRAM(s) Oral every 12 hours  chlorhexidine 2% Cloths 1 Application(s) Topical daily  clopidogrel Tablet 75 milliGRAM(s) Oral daily  dextrose 10% Bolus 125 milliLiter(s) IV Bolus once  dextrose 5%. 1000 milliLiter(s) IV Continuous <Continuous>  dextrose 5%. 1000 milliLiter(s) IV Continuous <Continuous>  influenza  Vaccine (HIGH DOSE) 0.7 milliLiter(s) IntraMuscular once  potassium chloride    Tablet ER 20 milliEquivalent(s) Oral every 2 hours  silver sulfADIAZINE 1% Cream 1 Application(s) Topical daily      REVIEW OF SYSTEMS:    CONSTITUTIONAL: No fever, weight loss, or fatigue  EYES: No eye pain, visual disturbances, or discharge  NECK: No pain or stiffness  RESPIRATORY: No cough, wheezing, chills or hemoptysis; No Shortness of Breath  CARDIOVASCULAR: No chest pain, palpitations, dizziness, or leg swelling  GASTROINTESTINAL: No abdominal or epigastric pain. No nausea, vomiting, or hematemesis; No diarrhea or constipation. No melena or hematochezia.  GENITOURINARY: No dysuria, frequency, hematuria, or incontinence  NEUROLOGICAL: No headaches, memory loss, loss of strength, numbness, or tremors  SKIN: No itching, burning, rashes, or lesions   LYMPH Nodes: No enlarged glands  MUSCULOSKELETAL: No joint pain or swelling; No muscle, back, or extremity pain  All other review of systems are negative.  	    PHYSICAL EXAM:  T(C): 36.6 (05-14-24 @ 05:16), Max: 36.8 (05-13-24 @ 17:14)  HR: 70 (05-14-24 @ 05:16) (70 - 91)  BP: 119/71 (05-14-24 @ 05:16) (119/71 - 137/72)  RR: 18 (05-14-24 @ 05:16) (18 - 18)  SpO2: 96% (05-14-24 @ 05:16) (94% - 98%)  Wt(kg): --  I&O's Summary    13 May 2024 07:01  -  14 May 2024 07:00  --------------------------------------------------------  IN: 1820 mL / OUT: 2575 mL / NET: -755 mL    14 May 2024 07:01  -  14 May 2024 11:25  --------------------------------------------------------  IN: 320 mL / OUT: 0 mL / NET: 320 mL          PHYSICAL EXAM    Appearance: Normal	  HEENT:   Normal oral mucosa, PERRL, EOMI	  NECK: Soft and supple, No LAD, No JVD  Cardiovascular: Regular Rate and Rhythm, Normal S1 S2, No murmurs, No clicks, gallops or rubs  Respiratory: Lungs clear to auscultation	  Extremities: No clubbing, cyanosis or edema    LABS:	 	                          9.3    18.63 )-----------( 294      ( 14 May 2024 07:03 )             28.4     05-14    138  |  99  |  56<H>  ----------------------------<  81  3.3<L>   |  25  |  1.44<H>    Ca    8.8      14 May 2024 07:06  Mg     2.4     05-13

## 2024-05-14 NOTE — PROGRESS NOTE ADULT - PROBLEM SELECTOR PLAN 1
Will continue current insulin regimen for now. Will continue monitoring  blood sugars, will Follow up.  Patient counseled for compliance with consistent low carb diet.  Now on steroid taper  DC on home Semglee , current dose  Stop home Farxiga altogther and any SGLT2i due to recent BARRY/rhabdo  OP follow up

## 2024-05-14 NOTE — PROGRESS NOTE ADULT - PROBLEM SELECTOR PROBLEM 1
Type 2 diabetes mellitus

## 2024-05-14 NOTE — PROGRESS NOTE ADULT - SUBJECTIVE AND OBJECTIVE BOX
Date of service: 24 @ 17:39      Patient is a 69y old  Male who presents with a chief complaint of Rhabdo (14 May 2024 14:27)                                                               INTERVAL HPI/OVERNIGHT EVENTS:    REVIEW OF SYSTEMS:     CONSTITUTIONAL: No weakness, fevers or chills  EYES/ENT: No visual changes , no ear ache   NECK: No pain or stiffness  RESPIRATORY: No cough, wheezing,  No shortness of breath  CARDIOVASCULAR: No chest pain or palpitations  GASTROINTESTINAL: No abdominal pain  . No nausea, vomiting, or hematemesis; No diarrhea or constipation. No melena or hematochezia.  GENITOURINARY: No dysuria, frequency or hematuria  NEUROLOGICAL: No numbness or weakness  SKIN: No itching, burning, rashes, or lesions                                                                                                                                                                                                                                                                                 Medications:  MEDICATIONS  (STANDING):  apixaban 2.5 milliGRAM(s) Oral every 12 hours  chlorhexidine 2% Cloths 1 Application(s) Topical daily  clopidogrel Tablet 75 milliGRAM(s) Oral daily  dextrose 10% Bolus 125 milliLiter(s) IV Bolus once  dextrose 5%. 1000 milliLiter(s) (100 mL/Hr) IV Continuous <Continuous>  dextrose 5%. 1000 milliLiter(s) (50 mL/Hr) IV Continuous <Continuous>  dextrose 50% Injectable 25 Gram(s) IV Push once  dextrose 50% Injectable 12.5 Gram(s) IV Push once  furosemide    Tablet 40 milliGRAM(s) Oral two times a day  glucagon  Injectable 1 milliGRAM(s) IntraMuscular once  insulin glargine Injectable (LANTUS) 12 Unit(s) SubCutaneous at bedtime  insulin lispro (ADMELOG) corrective regimen sliding scale   SubCutaneous three times a day before meals  insulin lispro (ADMELOG) corrective regimen sliding scale   SubCutaneous at bedtime  insulin lispro Injectable (ADMELOG) 7 Unit(s) SubCutaneous three times a day before meals  isosorbide   dinitrate Tablet (ISORDIL) 30 milliGRAM(s) Oral three times a day  methylPREDNISolone 12 milliGRAM(s) Oral every 24 hours  metoprolol tartrate 50 milliGRAM(s) Oral two times a day  polyethylene glycol 3350 17 Gram(s) Oral two times a day  senna 2 Tablet(s) Oral at bedtime  silver sulfADIAZINE 1% Cream 1 Application(s) Topical daily    MEDICATIONS  (PRN):  acetaminophen     Tablet .. 650 milliGRAM(s) Oral every 6 hours PRN Temp greater or equal to 38C (100.4F), Mild Pain (1 - 3)  bisacodyl 5 milliGRAM(s) Oral every 12 hours PRN Constipation  dextrose Oral Gel 15 Gram(s) Oral once PRN Blood Glucose LESS THAN 70 milliGRAM(s)/deciliter  oxyCODONE    IR 5 milliGRAM(s) Oral every 6 hours PRN Severe Pain (7 - 10)       Allergies    atorvastatin (Muscle Pain (Severe))    Intolerances      Vital Signs Last 24 Hrs  T(C): 36.7 (14 May 2024 16:05), Max: 36.7 (14 May 2024 12:05)  T(F): 98 (14 May 2024 16:05), Max: 98 (14 May 2024 12:05)  HR: 90 (14 May 2024 16:05) (70 - 90)  BP: 160/65 (14 May 2024 16:05) (119/71 - 160/65)  BP(mean): --  RR: 18 (14 May 2024 16:05) (18 - 18)  SpO2: 96% (14 May 2024 16:05) (96% - 98%)    Parameters below as of 14 May 2024 16:05  Patient On (Oxygen Delivery Method): room air      CAPILLARY BLOOD GLUCOSE      POCT Blood Glucose.: 199 mg/dL (14 May 2024 16:49)  POCT Blood Glucose.: 300 mg/dL (14 May 2024 11:56)  POCT Blood Glucose.: 88 mg/dL (14 May 2024 08:18)  POCT Blood Glucose.: 77 mg/dL (14 May 2024 07:47)  POCT Blood Glucose.: 215 mg/dL (13 May 2024 21:44)      13 @ 07:  -  14 @ 07:00  --------------------------------------------------------  IN: 1820 mL / OUT: 2575 mL / NET: -755 mL     @ 07:  -   @ 17:39  --------------------------------------------------------  IN: 320 mL / OUT: 1600 mL / NET: -1280 mL      Physical Exam:    Daily     Daily Weight in k.4 (14 May 2024 08:13)  General:  Well appearing, NAD, not cachetic  HEENT:  Nonicteric, PERRLA  CV:  RRR, S1S2   Lungs:  CTA B/L, no wheezes, rales, rhonchi  Abdomen:  Soft, non-tender, no distended, positive BS  Extremities:  no edema     Neuro:  AAOx3, non-focal, grossly intact                                                                                                                                                                                                                                                                                                LABS:                               9.3    18.63 )-----------( 294      ( 14 May 2024 07:03 )             28.4                      05-14    138  |  99  |  56<H>  ----------------------------<  81  3.3<L>   |  25  |  1.44<H>    Ca    8.8      14 May 2024 07:06  Mg     2.4     05-13                         RADIOLOGY & ADDITIONAL TESTS         I personally reviewed: [  ]EKG   [  ]CXR    [  ] CT      A/P:         Discussed with :     Derek consultants' Notes   Time spent :

## 2024-05-14 NOTE — DISCHARGE NOTE NURSING/CASE MANAGEMENT/SOCIAL WORK - PATIENT PORTAL LINK FT
You can access the FollowMyHealth Patient Portal offered by [facility] by registering at the following website: [email or web address]. By joining [employer]’s FollowMyHealth portal, you will also be able to view your health information using other applications (apps) compatible with our system.

## 2024-05-14 NOTE — CHART NOTE - NSCHARTNOTESELECT_GEN_ALL_CORE
Chest Pain
Event Note
Event Note
Post Op Check
RLE pain/Event Note
Rheumatology/Event Note
Steroids
Event Note
Event Note
Wound Care TEam Note;/Event Note

## 2024-05-14 NOTE — PROGRESS NOTE ADULT - PROBLEM SELECTOR PLAN 3
Monitor labs and electrolytes, on renal medications, Renal FU.

## 2024-05-14 NOTE — PROGRESS NOTE ADULT - PROVIDER SPECIALTY LIST ADULT
Cardiology
Endocrinology
Endocrinology
Internal Medicine
Nephrology
Rheumatology
Rheumatology
Surgery
Cardiology
Endocrinology
Nephrology
Rheumatology
Rheumatology
Cardiology
Cardiology
Endocrinology
Infectious Disease
Internal Medicine
Nephrology
Surgery
Trauma Surgery
Cardiology
Endocrinology
Internal Medicine
Nephrology
Nephrology
Rheumatology
Trauma Surgery
Vascular Surgery
Internal Medicine
Internal Medicine
Endocrinology

## 2024-05-14 NOTE — PROGRESS NOTE ADULT - ASSESSMENT
68 y/o man      h/o severe CAD S/P multiple PCI's including multiple LM and LAD stents.       +A-fib, HTN, HLD, T2DM, CKD3, former smoker     presents with  weaknss/   leg  pains.  no leg  weakness      + troponin /  elevated  cpk      CAD,  has   10  stents/  HTN/  HLD        cath,   4/18,  2.0 mm caliber TITO to distal LAD,Dr ILDA Dominguez        on  dapt/  ef   47     troponin,  likely   demand  ischemia.  not  mi  per  dr arellano,,  not a  candidate   for  further intervention,   small  caliber  vessels   c/c  Afib,  on eliquis         elevated  CPK,    suspect  statin induced.  rhabdomyolysis/  form drug vs  polymyositis,    need  to  r/o  polymyositis, vs  drug induced   myositis,    mri  pelvis,  myositis/  fx  femoral condyle.  ortho  eval  noted,  rec immobilizer  pt refusing    ct a/p, neck, no  malignancy   await   bx   by   surg DR. palomino/  on iv  steroid  per   rheum   cont off statin   fu BX   follow  path  monitor  MACARENA   cont steroids ..   appreciate rheum input : -given patients clinical improvement and downtrending CK, as well as hyperglycemia and leg swelling, will favor to taper off medrol  -would c/w following taper: medrol 24mg (for 2 days), then medrol 16mg (for 2 days), medrol 12mg (for 2 days), medrol 8mg (for 2 days), then medrol 4mg (for 2 days), then stop  -please monitor CK and muscle strength at rehab  -can follow up muscle biopsy: d/w Rheum   ... 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 when stable   on asa/  eliquis, plavix, / lasix, imdur.  valsartan.  toprol      hold   Farxiga   and  valsartan for  now,  iv fluids,  per  renal   c/c  anemia            CAD : stent/on  4/17/24.   only   option , to   temporarily hold  eliquis  and   continue   plavix,   and   card  dr bravo and  dr brijesh dominguez in  agreement    wbc  noted, from steroid,  cpk/  lfts decreasing     edema : dc ivf   increase lasix .. will give one extra dose today   and change to po upon dc       Electrolyte abn : replete potassium aaggressively and repeat K and mag   rehab       acute blood loss anemia : tranfsue one unit   monitor H/h   noted       discussed at length pt   d/w acp   d/w CM and with rheum                       -

## 2024-05-14 NOTE — PROGRESS NOTE ADULT - SUBJECTIVE AND OBJECTIVE BOX
Chief complaint  Patient is a 69y old  Male who presents with a chief complaint of Rhabdo (14 May 2024 08:22)         Labs and Fingersticks  CAPILLARY BLOOD GLUCOSE      POCT Blood Glucose.: 300 mg/dL (14 May 2024 11:56)  POCT Blood Glucose.: 88 mg/dL (14 May 2024 08:18)  POCT Blood Glucose.: 77 mg/dL (14 May 2024 07:47)  POCT Blood Glucose.: 215 mg/dL (13 May 2024 21:44)  POCT Blood Glucose.: 183 mg/dL (13 May 2024 16:42)      Anion Gap: 14 (05-14 @ 07:06)  Anion Gap: 9 (05-13 @ 04:55)      Calcium: 8.8 (05-14 @ 07:06)  Calcium: 8.8 (05-13 @ 04:55)          05-14    138  |  99  |  56<H>  ----------------------------<  81  3.3<L>   |  25  |  1.44<H>    Ca    8.8      14 May 2024 07:06  Mg     2.4     05-13                          9.3    18.63 )-----------( 294      ( 14 May 2024 07:03 )             28.4     Medications  MEDICATIONS  (STANDING):  apixaban 2.5 milliGRAM(s) Oral every 12 hours  chlorhexidine 2% Cloths 1 Application(s) Topical daily  clopidogrel Tablet 75 milliGRAM(s) Oral daily  dextrose 10% Bolus 125 milliLiter(s) IV Bolus once  dextrose 5%. 1000 milliLiter(s) (100 mL/Hr) IV Continuous <Continuous>  dextrose 5%. 1000 milliLiter(s) (50 mL/Hr) IV Continuous <Continuous>  dextrose 50% Injectable 25 Gram(s) IV Push once  dextrose 50% Injectable 12.5 Gram(s) IV Push once  furosemide    Tablet 40 milliGRAM(s) Oral two times a day  glucagon  Injectable 1 milliGRAM(s) IntraMuscular once  insulin glargine Injectable (LANTUS) 12 Unit(s) SubCutaneous at bedtime  insulin lispro (ADMELOG) corrective regimen sliding scale   SubCutaneous at bedtime  insulin lispro (ADMELOG) corrective regimen sliding scale   SubCutaneous three times a day before meals  insulin lispro Injectable (ADMELOG) 7 Unit(s) SubCutaneous three times a day before meals  isosorbide   dinitrate Tablet (ISORDIL) 30 milliGRAM(s) Oral three times a day  methylPREDNISolone 12 milliGRAM(s) Oral every 24 hours  metoprolol tartrate 50 milliGRAM(s) Oral two times a day  polyethylene glycol 3350 17 Gram(s) Oral two times a day  senna 2 Tablet(s) Oral at bedtime  silver sulfADIAZINE 1% Cream 1 Application(s) Topical daily      Physical Exam  General: Patient comfortable in bed   Vital Signs Last 12 Hrs  T(F): 98 (05-14-24 @ 12:05), Max: 98 (05-14-24 @ 12:05)  HR: 80 (05-14-24 @ 12:05) (70 - 80)  BP: 130/76 (05-14-24 @ 12:05) (119/71 - 130/76)  BP(mean): --  RR: 18 (05-14-24 @ 12:05) (18 - 18)  SpO2: 98% (05-14-24 @ 12:05) (96% - 98%)    CVS: S1S2   Respiratory: No wheezing, no crepitations  GI: Abdomen soft, bowel sounds positive  Musculoskeletal:  moves all extremities  : Voiding

## 2024-05-14 NOTE — CHART NOTE - NSCHARTNOTEFT_GEN_A_CORE
discussed w/ patient results of muscle biopsy. 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     Plan:  -Would finish steroid taper as planned out in previous note  -patient will not need further immunosuppression  -will need to avoid statin in future  -can f/u with Rheumatologist Dr Pelletier as an outpatient    Discussed with Attending Dr. Mary Mary, DO  Rheumatology Fellow  Available on TEAMS

## 2024-05-14 NOTE — PROGRESS NOTE ADULT - ASSESSMENT
70 y/o male with hx of CAD s/p multiple  stents (most recent to pLM (70%), pLAD, mLAD on 1/8/24 with Dr. Dominguez), HFrEF , A-fib, HTN, HLD, T2DM,  CKD3, former smoker  Assessment  DMT2: 69y Male with DM T2 with hyperglycemia, A1C 7.6% , was on oral meds and insulin at  home  (Farxiga and  Semglee basal insulin), now on basal bolus insulins, glucose now improving.   was on steroids, insulin dose adjusted.  CAD: on medications, stable, monitored.   HTN: on antihypertensive medications, monitored, asymptomatic.  CKD: Monitor labs/BMP, renal follow up. rhabdo, muscle bx      Discussed plan and management with Dr Flo Jamil NP - TEAMS  Myrna Rossi MD  Cell: [phone]  Office: [phone]

## 2024-05-14 NOTE — PROGRESS NOTE ADULT - SUBJECTIVE AND OBJECTIVE BOX
Overnight events noted      VITAL:  T(C): , Max: 36.8 (05-13-24 @ 17:14)  T(F): , Max: 98.2 (05-13-24 @ 17:14)  HR: 70 (05-14-24 @ 05:16)  BP: 119/71 (05-14-24 @ 05:16)  BP(mean): --  RR: 18 (05-14-24 @ 05:16)  SpO2: 96% (05-14-24 @ 05:16)  Wt(kg): --      PHYSICAL EXAM:  Constitutional: alert, NAD  HEENT: NCAT, DMM  Neck: Supple, No JVD  Respiratory: CTA-b/l  Cardiovascular: RRR s1s2, no m/r/g  Gastrointestinal: BS+, soft, NT/ND  Extremities: (+)b/l LE edema/mild tenderness  Neurological: no focal deficits; strength grossly intact  Back: no CVAT b/l  Skin: No rashes, no nevi    LABS:                        9.3    18.63 )-----------( 294      ( 14 May 2024 07:03 )             28.4     Na(138)/K(3.3)/Cl(99)/HCO3(25)/BUN(56)/Cr(1.44)Glu(81)/Ca(8.8)/Mg(--)/PO4(--)    05-14 @ 07:06  Na(136)/K(4.1)/Cl(99)/HCO3(28)/BUN(61)/Cr(1.49)Glu(194)/Ca(8.8)/Mg(2.4)/PO4(--)    05-13 @ 04:55  Na(138)/K(3.2)/Cl(98)/HCO3(28)/BUN(58)/Cr(1.46)Glu(120)/Ca(8.7)/Mg(--)/PO4(--)    05-12 @ 06:36      IMPRESSION: 69M w/ HTN, DM2, CAD, AFib, HFrEF, and CKD, s/p recent PCI, 4/25/24 returned with acute rhabdomyolysis    (1)Renal - CKD3b - due to diabetes/hypertension - at/near baseline  (2)Rhabdo - resolved  (3)Hypokalemia - whole body deficit, at least in part due to diuresis with Lasix  (4)Rheum - inflammatory myositis - Surgery and Rheum on board- s/p muscle biopsy 5/2, biopsy report returned yesterday (?)      RECOMMEND:  (1)KCL 20meq po x 2  (2)F/U Rheum input, s/p biopsy report                Markell Walton MD  [facility]  Office/on call physician: [phone]  Cell (7a-7p): [phone]       no complaints      VITAL:  T(C): , Max: 36.8 (05-13-24 @ 17:14)  T(F): , Max: 98.2 (05-13-24 @ 17:14)  HR: 70 (05-14-24 @ 05:16)  BP: 119/71 (05-14-24 @ 05:16)  BP(mean): --  RR: 18 (05-14-24 @ 05:16)  SpO2: 96% (05-14-24 @ 05:16)  Wt(kg): --      PHYSICAL EXAM:  Constitutional: alert, NAD  HEENT: NCAT, DMM  Neck: Supple, No JVD  Respiratory: CTA-b/l  Cardiovascular: RRR s1s2, no m/r/g  Gastrointestinal: BS+, soft, NT/ND  Extremities: (+)b/l LE edema/mild tenderness  Neurological: no focal deficits; strength grossly intact  Back: no CVAT b/l  Skin: No rashes, no nevi    LABS:                        9.3    18.63 )-----------( 294      ( 14 May 2024 07:03 )             28.4     Na(138)/K(3.3)/Cl(99)/HCO3(25)/BUN(56)/Cr(1.44)Glu(81)/Ca(8.8)/Mg(--)/PO4(--)    05-14 @ 07:06  Na(136)/K(4.1)/Cl(99)/HCO3(28)/BUN(61)/Cr(1.49)Glu(194)/Ca(8.8)/Mg(2.4)/PO4(--)    05-13 @ 04:55  Na(138)/K(3.2)/Cl(98)/HCO3(28)/BUN(58)/Cr(1.46)Glu(120)/Ca(8.7)/Mg(--)/PO4(--)    05-12 @ 06:36      IMPRESSION: 69M w/ HTN, DM2, CAD, AFib, HFrEF, and CKD, s/p recent PCI, 4/25/24 returned with acute rhabdomyolysis    (1)Renal - CKD3b - due to diabetes/hypertension - at/near baseline  (2)Rhabdo - resolved  (3)Hypokalemia - whole body deficit, at least in part due to diuresis with Lasix  (4)Rheum - inflammatory myositis - Surgery and Rheum on board- s/p muscle biopsy 5/2, biopsy report returned yesterday (?)      RECOMMEND:  (1)KCL 20meq po x 2  (2)F/U Rheum input, s/p biopsy report                Markell Walton MD  [facility]  Office/on call physician: [phone]  Cell (7a-7a): [phone]

## 2024-05-14 NOTE — PROGRESS NOTE ADULT - PROBLEM SELECTOR PLAN 2
Suggest to continue medications, monitoring, FU primary team recommendations.

## 2024-05-14 NOTE — PROGRESS NOTE ADULT - ASSESSMENT
#ASHD s/p multiple pci.   #A-fib  #HTN  #HL  #T2DM  #CKD   #Rhabdomyolysis, slowly resolving  #9b wct yesterday evening; blood work K+ low 3.3: please replenish and follow telemetry and electrolytes carefully.

## 2024-05-14 NOTE — PROGRESS NOTE ADULT - PROBLEM SELECTOR PROBLEM 3
Stage 3 chronic kidney disease

## 2024-05-15 LAB
ANTI PM-SCL-100 PLUS: <20 UNITS — SIGNIFICANT CHANGE UP
ANTI-SAE 1 IGG: <20 UNITS — SIGNIFICANT CHANGE UP
ANTI-SS-A 52 KD AB, IGG PLUS: <20 UNITS — SIGNIFICANT CHANGE UP
ANTI-U1-RNP AB PLUS: <20 UNITS — SIGNIFICANT CHANGE UP
EJ MYOMARKER3 PLUS: NEGATIVE — SIGNIFICANT CHANGE UP
ENA JO1 AB SER IA-ACNC: <20 UNITS — SIGNIFICANT CHANGE UP
FIBRILLARIN (U3 RNP) PLUS: NEGATIVE — SIGNIFICANT CHANGE UP
KU MYOMARKER3 PLUS: NEGATIVE — SIGNIFICANT CHANGE UP
MDA5 (P140)(CADM-140) PLUS: <20 UNITS — SIGNIFICANT CHANGE UP
MI-2 PLUS: NEGATIVE — SIGNIFICANT CHANGE UP
NXP-2 (P140) MYOPLUS: <20 UNITS — SIGNIFICANT CHANGE UP
OJ MYOMARKER3 PLUS: NEGATIVE — SIGNIFICANT CHANGE UP
PL-12 PLUS: NEGATIVE — SIGNIFICANT CHANGE UP
PL-7 PLUS: NEGATIVE — SIGNIFICANT CHANGE UP
SRP MYOMARKER3 PLUS: NEGATIVE — SIGNIFICANT CHANGE UP
TIF GAMMA (P155/140) PLUS: <20 UNITS — SIGNIFICANT CHANGE UP
U2 SNRNP PLUS: NEGATIVE — SIGNIFICANT CHANGE UP

## 2024-05-21 ENCOUNTER — NON-APPOINTMENT (OUTPATIENT)
Age: 69
End: 2024-05-21

## 2024-05-28 ENCOUNTER — APPOINTMENT (OUTPATIENT)
Dept: ORTHOPEDIC SURGERY | Facility: CLINIC | Age: 69
End: 2024-05-28

## 2024-06-03 ENCOUNTER — APPOINTMENT (OUTPATIENT)
Dept: ORTHOPEDIC SURGERY | Facility: CLINIC | Age: 69
End: 2024-06-03

## 2024-06-14 ENCOUNTER — APPOINTMENT (OUTPATIENT)
Dept: RHEUMATOLOGY | Facility: CLINIC | Age: 69
End: 2024-06-14

## 2024-06-22 ENCOUNTER — INPATIENT (INPATIENT)
Facility: HOSPITAL | Age: 69
LOS: 2 days | Discharge: HOME | DRG: 291 | End: 2024-06-25
Attending: GENERAL ACUTE CARE HOSPITAL | Admitting: GENERAL ACUTE CARE HOSPITAL
Payer: COMMERCIAL

## 2024-06-22 VITALS
DIASTOLIC BLOOD PRESSURE: 72 MMHG | HEART RATE: 78 BPM | TEMPERATURE: 98 F | OXYGEN SATURATION: 96 % | HEIGHT: 66 IN | WEIGHT: 126.1 LBS | RESPIRATION RATE: 19 BRPM | SYSTOLIC BLOOD PRESSURE: 134 MMHG

## 2024-06-22 DIAGNOSIS — I50.23 ACUTE ON CHRONIC SYSTOLIC (CONGESTIVE) HEART FAILURE: ICD-10-CM

## 2024-06-22 LAB
ADD ON TEST-SPECIMEN IN LAB: SIGNIFICANT CHANGE UP
ALBUMIN SERPL ELPH-MCNC: 3.8 G/DL — SIGNIFICANT CHANGE UP (ref 3.3–5)
ALP SERPL-CCNC: 131 U/L — HIGH (ref 40–120)
ALT FLD-CCNC: 61 U/L — HIGH (ref 10–45)
ANION GAP SERPL CALC-SCNC: 9 MMOL/L — SIGNIFICANT CHANGE UP (ref 5–17)
AST SERPL-CCNC: 66 U/L — HIGH (ref 10–40)
BASOPHILS # BLD AUTO: 0.03 K/UL — SIGNIFICANT CHANGE UP (ref 0–0.2)
BASOPHILS NFR BLD AUTO: 0.4 % — SIGNIFICANT CHANGE UP (ref 0–2)
BILIRUB SERPL-MCNC: 1 MG/DL — SIGNIFICANT CHANGE UP (ref 0.2–1.2)
BUN SERPL-MCNC: 27 MG/DL — HIGH (ref 7–23)
CALCIUM SERPL-MCNC: 9.8 MG/DL — SIGNIFICANT CHANGE UP (ref 8.4–10.5)
CHLORIDE SERPL-SCNC: 102 MMOL/L — SIGNIFICANT CHANGE UP (ref 96–108)
CK MB BLD-MCNC: 13.2 % — HIGH (ref 0–3.5)
CK MB CFR SERPL CALC: 27 NG/ML — HIGH (ref 0–6.7)
CK SERPL-CCNC: 204 U/L — HIGH (ref 30–200)
CO2 SERPL-SCNC: 27 MMOL/L — SIGNIFICANT CHANGE UP (ref 22–31)
CREAT SERPL-MCNC: 1.57 MG/DL — HIGH (ref 0.5–1.3)
EGFR: 48 ML/MIN/1.73M2 — LOW
EOSINOPHIL # BLD AUTO: 0.3 K/UL — SIGNIFICANT CHANGE UP (ref 0–0.5)
EOSINOPHIL NFR BLD AUTO: 3.9 % — SIGNIFICANT CHANGE UP (ref 0–6)
GAS PNL BLDV: SIGNIFICANT CHANGE UP
GLUCOSE BLDC GLUCOMTR-MCNC: 266 MG/DL — HIGH (ref 70–99)
GLUCOSE SERPL-MCNC: 262 MG/DL — HIGH (ref 70–99)
HCT VFR BLD CALC: 37.1 % — LOW (ref 39–50)
HGB BLD-MCNC: 11.8 G/DL — LOW (ref 13–17)
IMM GRANULOCYTES NFR BLD AUTO: 0.5 % — SIGNIFICANT CHANGE UP (ref 0–0.9)
LYMPHOCYTES # BLD AUTO: 0.88 K/UL — LOW (ref 1–3.3)
LYMPHOCYTES # BLD AUTO: 11.6 % — LOW (ref 13–44)
MCHC RBC-ENTMCNC: 29.6 PG — SIGNIFICANT CHANGE UP (ref 27–34)
MCHC RBC-ENTMCNC: 31.8 GM/DL — LOW (ref 32–36)
MCV RBC AUTO: 93 FL — SIGNIFICANT CHANGE UP (ref 80–100)
MONOCYTES # BLD AUTO: 0.69 K/UL — SIGNIFICANT CHANGE UP (ref 0–0.9)
MONOCYTES NFR BLD AUTO: 9.1 % — SIGNIFICANT CHANGE UP (ref 2–14)
NEUTROPHILS # BLD AUTO: 5.66 K/UL — SIGNIFICANT CHANGE UP (ref 1.8–7.4)
NEUTROPHILS NFR BLD AUTO: 74.5 % — SIGNIFICANT CHANGE UP (ref 43–77)
NRBC # BLD: 0 /100 WBCS — SIGNIFICANT CHANGE UP (ref 0–0)
NT-PROBNP SERPL-SCNC: HIGH PG/ML (ref 0–300)
PLATELET # BLD AUTO: 219 K/UL — SIGNIFICANT CHANGE UP (ref 150–400)
POTASSIUM SERPL-MCNC: 4.7 MMOL/L — SIGNIFICANT CHANGE UP (ref 3.5–5.3)
POTASSIUM SERPL-SCNC: 4.7 MMOL/L — SIGNIFICANT CHANGE UP (ref 3.5–5.3)
PROT SERPL-MCNC: 6.6 G/DL — SIGNIFICANT CHANGE UP (ref 6–8.3)
RBC # BLD: 3.99 M/UL — LOW (ref 4.2–5.8)
RBC # FLD: 15.7 % — HIGH (ref 10.3–14.5)
SODIUM SERPL-SCNC: 138 MMOL/L — SIGNIFICANT CHANGE UP (ref 135–145)
TROPONIN T, HIGH SENSITIVITY RESULT: 667 NG/L — HIGH (ref 0–51)
TROPONIN T, HIGH SENSITIVITY RESULT: 676 NG/L — HIGH (ref 0–51)
WBC # BLD: 7.6 K/UL — SIGNIFICANT CHANGE UP (ref 3.8–10.5)
WBC # FLD AUTO: 7.6 K/UL — SIGNIFICANT CHANGE UP (ref 3.8–10.5)

## 2024-06-22 PROCEDURE — 99285 EMERGENCY DEPT VISIT HI MDM: CPT

## 2024-06-22 PROCEDURE — 71046 X-RAY EXAM CHEST 2 VIEWS: CPT | Mod: 26

## 2024-06-22 RX ORDER — GLUCAGON HYDROCHLORIDE 1 MG/ML
1 INJECTION, POWDER, FOR SOLUTION INTRAMUSCULAR; INTRAVENOUS; SUBCUTANEOUS ONCE
Refills: 0 | Status: DISCONTINUED | OUTPATIENT
Start: 2024-06-22 | End: 2024-06-25

## 2024-06-22 RX ORDER — DEXTROSE 30 % IN WATER 30 %
15 VIAL (ML) INTRAVENOUS ONCE
Refills: 0 | Status: DISCONTINUED | OUTPATIENT
Start: 2024-06-22 | End: 2024-06-25

## 2024-06-22 RX ORDER — DEXTROSE 30 % IN WATER 30 %
25 VIAL (ML) INTRAVENOUS ONCE
Refills: 0 | Status: DISCONTINUED | OUTPATIENT
Start: 2024-06-22 | End: 2024-06-25

## 2024-06-22 RX ORDER — CLOPIDOGREL BISULFATE 75 MG/1
1 TABLET, FILM COATED ORAL
Refills: 0 | DISCHARGE

## 2024-06-22 RX ORDER — DEXTROSE MONOHYDRATE AND SODIUM CHLORIDE 5; .3 G/100ML; G/100ML
1000 INJECTION, SOLUTION INTRAVENOUS
Refills: 0 | Status: DISCONTINUED | OUTPATIENT
Start: 2024-06-22 | End: 2024-06-25

## 2024-06-22 RX ORDER — FUROSEMIDE 10 MG/ML
60 INJECTION, SOLUTION INTRAMUSCULAR; INTRAVENOUS DAILY
Refills: 0 | Status: DISCONTINUED | OUTPATIENT
Start: 2024-06-23 | End: 2024-06-25

## 2024-06-22 RX ORDER — SENNOSIDES 8.6 MG
2 TABLET ORAL AT BEDTIME
Refills: 0 | Status: DISCONTINUED | OUTPATIENT
Start: 2024-06-22 | End: 2024-06-25

## 2024-06-22 RX ORDER — INSULIN LISPRO 100 [IU]/ML
5 INJECTION, SOLUTION SUBCUTANEOUS
Refills: 0 | Status: DISCONTINUED | OUTPATIENT
Start: 2024-06-22 | End: 2024-06-25

## 2024-06-22 RX ORDER — INSULIN GLARGINE 100 [IU]/ML
17 INJECTION, SOLUTION SUBCUTANEOUS AT BEDTIME
Refills: 0 | Status: DISCONTINUED | OUTPATIENT
Start: 2024-06-22 | End: 2024-06-25

## 2024-06-22 RX ORDER — ISOSORBIDE DINITRATE 5 MG/1
10 TABLET ORAL THREE TIMES A DAY
Refills: 0 | Status: DISCONTINUED | OUTPATIENT
Start: 2024-06-22 | End: 2024-06-25

## 2024-06-22 RX ORDER — METOPROLOL TARTRATE 50 MG
50 TABLET ORAL
Refills: 0 | Status: DISCONTINUED | OUTPATIENT
Start: 2024-06-22 | End: 2024-06-25

## 2024-06-22 RX ORDER — CLOPIDOGREL BISULFATE 75 MG/1
75 TABLET, FILM COATED ORAL DAILY
Refills: 0 | Status: DISCONTINUED | OUTPATIENT
Start: 2024-06-22 | End: 2024-06-25

## 2024-06-22 RX ORDER — HEPARIN SODIUM 50 [USP'U]/ML
INJECTION, SOLUTION INTRAVENOUS
Qty: 25000 | Refills: 0 | Status: DISCONTINUED | OUTPATIENT
Start: 2024-06-22 | End: 2024-06-25

## 2024-06-22 RX ORDER — INSULIN LISPRO 100 [IU]/ML
INJECTION, SOLUTION SUBCUTANEOUS
Refills: 0 | Status: DISCONTINUED | OUTPATIENT
Start: 2024-06-22 | End: 2024-06-25

## 2024-06-22 RX ORDER — DEXTROSE MONOHYDRATE 100 MG/ML
125 INJECTION, SOLUTION INTRAVENOUS ONCE
Refills: 0 | Status: DISCONTINUED | OUTPATIENT
Start: 2024-06-22 | End: 2024-06-25

## 2024-06-22 RX ORDER — RIVAROXABAN 10 MG/1
15 TABLET, FILM COATED ORAL
Refills: 0 | Status: DISCONTINUED | OUTPATIENT
Start: 2024-06-22 | End: 2024-06-22

## 2024-06-22 RX ORDER — HEPARIN SODIUM 50 [USP'U]/ML
3500 INJECTION, SOLUTION INTRAVENOUS EVERY 6 HOURS
Refills: 0 | Status: DISCONTINUED | OUTPATIENT
Start: 2024-06-22 | End: 2024-06-25

## 2024-06-22 RX ORDER — FUROSEMIDE 10 MG/ML
40 INJECTION, SOLUTION INTRAMUSCULAR; INTRAVENOUS ONCE
Refills: 0 | Status: DISCONTINUED | OUTPATIENT
Start: 2024-06-22 | End: 2024-06-22

## 2024-06-22 RX ORDER — DEXTROSE 30 % IN WATER 30 %
12.5 VIAL (ML) INTRAVENOUS ONCE
Refills: 0 | Status: DISCONTINUED | OUTPATIENT
Start: 2024-06-22 | End: 2024-06-25

## 2024-06-22 RX ORDER — ASPIRIN 325 MG/1
81 TABLET, FILM COATED ORAL DAILY
Refills: 0 | Status: DISCONTINUED | OUTPATIENT
Start: 2024-06-22 | End: 2024-06-25

## 2024-06-22 RX ORDER — RIVAROXABAN 10 MG/1
1 TABLET, FILM COATED ORAL
Refills: 0 | DISCHARGE

## 2024-06-22 RX ORDER — FUROSEMIDE 10 MG/ML
40 INJECTION, SOLUTION INTRAMUSCULAR; INTRAVENOUS ONCE
Refills: 0 | Status: COMPLETED | OUTPATIENT
Start: 2024-06-22 | End: 2024-06-22

## 2024-06-22 RX ORDER — HEPARIN SODIUM 50 [USP'U]/ML
3500 INJECTION, SOLUTION INTRAVENOUS ONCE
Refills: 0 | Status: COMPLETED | OUTPATIENT
Start: 2024-06-22 | End: 2024-06-23

## 2024-06-22 RX ADMIN — FUROSEMIDE 40 MILLIGRAM(S): 10 INJECTION, SOLUTION INTRAMUSCULAR; INTRAVENOUS at 14:43

## 2024-06-22 RX ADMIN — INSULIN LISPRO 3: 100 INJECTION, SOLUTION SUBCUTANEOUS at 23:05

## 2024-06-22 RX ADMIN — Medication 2 TABLET(S): at 21:55

## 2024-06-22 RX ADMIN — INSULIN GLARGINE 17 UNIT(S): 100 INJECTION, SOLUTION SUBCUTANEOUS at 23:07

## 2024-06-23 LAB
A1C WITH ESTIMATED AVERAGE GLUCOSE RESULT: 7.1 % — HIGH (ref 4–5.6)
APTT BLD: 133 SEC — CRITICAL HIGH (ref 24.5–35.6)
APTT BLD: 42.6 SEC — HIGH (ref 24.5–35.6)
APTT BLD: 48.9 SEC — HIGH (ref 24.5–35.6)
APTT BLD: 61.6 SEC — HIGH (ref 24.5–35.6)
ESTIMATED AVERAGE GLUCOSE: 157 MG/DL — HIGH (ref 68–114)
GLUCOSE BLDC GLUCOMTR-MCNC: 113 MG/DL — HIGH (ref 70–99)
GLUCOSE BLDC GLUCOMTR-MCNC: 130 MG/DL — HIGH (ref 70–99)
GLUCOSE BLDC GLUCOMTR-MCNC: 178 MG/DL — HIGH (ref 70–99)
GLUCOSE BLDC GLUCOMTR-MCNC: 210 MG/DL — HIGH (ref 70–99)
HCT VFR BLD CALC: 38.3 % — LOW (ref 39–50)
HGB BLD-MCNC: 12.8 G/DL — LOW (ref 13–17)
INR BLD: 2.05 RATIO — HIGH (ref 0.85–1.18)
MCHC RBC-ENTMCNC: 30 PG — SIGNIFICANT CHANGE UP (ref 27–34)
MCHC RBC-ENTMCNC: 33.4 GM/DL — SIGNIFICANT CHANGE UP (ref 32–36)
MCV RBC AUTO: 89.7 FL — SIGNIFICANT CHANGE UP (ref 80–100)
NRBC # BLD: 0 /100 WBCS — SIGNIFICANT CHANGE UP (ref 0–0)
PLATELET # BLD AUTO: 231 K/UL — SIGNIFICANT CHANGE UP (ref 150–400)
PROTHROM AB SERPL-ACNC: 21.1 SEC — HIGH (ref 9.5–13)
RBC # BLD: 4.27 M/UL — SIGNIFICANT CHANGE UP (ref 4.2–5.8)
RBC # FLD: 15.7 % — HIGH (ref 10.3–14.5)
TROPONIN T, HIGH SENSITIVITY RESULT: 606 NG/L — HIGH (ref 0–51)
WBC # BLD: 8.74 K/UL — SIGNIFICANT CHANGE UP (ref 3.8–10.5)
WBC # FLD AUTO: 8.74 K/UL — SIGNIFICANT CHANGE UP (ref 3.8–10.5)

## 2024-06-23 RX ADMIN — HEPARIN SODIUM 600 UNIT(S)/HR: 50 INJECTION, SOLUTION INTRAVENOUS at 19:26

## 2024-06-23 RX ADMIN — INSULIN LISPRO 5 UNIT(S): 100 INJECTION, SOLUTION SUBCUTANEOUS at 08:10

## 2024-06-23 RX ADMIN — CLOPIDOGREL BISULFATE 75 MILLIGRAM(S): 75 TABLET, FILM COATED ORAL at 12:13

## 2024-06-23 RX ADMIN — HEPARIN SODIUM 500 UNIT(S)/HR: 50 INJECTION, SOLUTION INTRAVENOUS at 08:12

## 2024-06-23 RX ADMIN — HEPARIN SODIUM 700 UNIT(S)/HR: 50 INJECTION, SOLUTION INTRAVENOUS at 00:07

## 2024-06-23 RX ADMIN — ISOSORBIDE DINITRATE 10 MILLIGRAM(S): 5 TABLET ORAL at 12:13

## 2024-06-23 RX ADMIN — INSULIN GLARGINE 17 UNIT(S): 100 INJECTION, SOLUTION SUBCUTANEOUS at 22:04

## 2024-06-23 RX ADMIN — INSULIN LISPRO 5 UNIT(S): 100 INJECTION, SOLUTION SUBCUTANEOUS at 12:12

## 2024-06-23 RX ADMIN — INSULIN LISPRO 1: 100 INJECTION, SOLUTION SUBCUTANEOUS at 16:58

## 2024-06-23 RX ADMIN — Medication 50 MILLIGRAM(S): at 05:05

## 2024-06-23 RX ADMIN — Medication 50 MILLIGRAM(S): at 16:58

## 2024-06-23 RX ADMIN — HEPARIN SODIUM 600 UNIT(S)/HR: 50 INJECTION, SOLUTION INTRAVENOUS at 14:42

## 2024-06-23 RX ADMIN — HEPARIN SODIUM 3500 UNIT(S): 50 INJECTION, SOLUTION INTRAVENOUS at 00:23

## 2024-06-23 RX ADMIN — ISOSORBIDE DINITRATE 10 MILLIGRAM(S): 5 TABLET ORAL at 16:58

## 2024-06-23 RX ADMIN — FUROSEMIDE 60 MILLIGRAM(S): 10 INJECTION, SOLUTION INTRAMUSCULAR; INTRAVENOUS at 05:09

## 2024-06-23 RX ADMIN — HEPARIN SODIUM 0 UNIT(S)/HR: 50 INJECTION, SOLUTION INTRAVENOUS at 07:11

## 2024-06-23 RX ADMIN — ASPIRIN 81 MILLIGRAM(S): 325 TABLET, FILM COATED ORAL at 12:13

## 2024-06-23 RX ADMIN — HEPARIN SODIUM 600 UNIT(S)/HR: 50 INJECTION, SOLUTION INTRAVENOUS at 22:49

## 2024-06-23 RX ADMIN — INSULIN LISPRO 5 UNIT(S): 100 INJECTION, SOLUTION SUBCUTANEOUS at 16:58

## 2024-06-23 RX ADMIN — HEPARIN SODIUM 600 UNIT(S)/HR: 50 INJECTION, SOLUTION INTRAVENOUS at 18:12

## 2024-06-23 RX ADMIN — ISOSORBIDE DINITRATE 10 MILLIGRAM(S): 5 TABLET ORAL at 05:05

## 2024-06-24 ENCOUNTER — RESULT REVIEW (OUTPATIENT)
Age: 69
End: 2024-06-24

## 2024-06-24 LAB
ALBUMIN SERPL ELPH-MCNC: 3.6 G/DL — SIGNIFICANT CHANGE UP (ref 3.3–5)
ALP SERPL-CCNC: 110 U/L — SIGNIFICANT CHANGE UP (ref 40–120)
ALT FLD-CCNC: 30 U/L — SIGNIFICANT CHANGE UP (ref 10–45)
ANION GAP SERPL CALC-SCNC: 12 MMOL/L — SIGNIFICANT CHANGE UP (ref 5–17)
APTT BLD: 56.2 SEC — HIGH (ref 24.5–35.6)
AST SERPL-CCNC: 29 U/L — SIGNIFICANT CHANGE UP (ref 10–40)
BILIRUB SERPL-MCNC: 0.9 MG/DL — SIGNIFICANT CHANGE UP (ref 0.2–1.2)
BUN SERPL-MCNC: 34 MG/DL — HIGH (ref 7–23)
CALCIUM SERPL-MCNC: 9.2 MG/DL — SIGNIFICANT CHANGE UP (ref 8.4–10.5)
CHLORIDE SERPL-SCNC: 102 MMOL/L — SIGNIFICANT CHANGE UP (ref 96–108)
CO2 SERPL-SCNC: 24 MMOL/L — SIGNIFICANT CHANGE UP (ref 22–31)
CREAT SERPL-MCNC: 1.62 MG/DL — HIGH (ref 0.5–1.3)
EGFR: 46 ML/MIN/1.73M2 — LOW
GLUCOSE BLDC GLUCOMTR-MCNC: 105 MG/DL — HIGH (ref 70–99)
GLUCOSE BLDC GLUCOMTR-MCNC: 175 MG/DL — HIGH (ref 70–99)
GLUCOSE BLDC GLUCOMTR-MCNC: 200 MG/DL — HIGH (ref 70–99)
GLUCOSE BLDC GLUCOMTR-MCNC: 86 MG/DL — SIGNIFICANT CHANGE UP (ref 70–99)
GLUCOSE SERPL-MCNC: 95 MG/DL — SIGNIFICANT CHANGE UP (ref 70–99)
HCT VFR BLD CALC: 35.1 % — LOW (ref 39–50)
HGB BLD-MCNC: 11.8 G/DL — LOW (ref 13–17)
MCHC RBC-ENTMCNC: 29.5 PG — SIGNIFICANT CHANGE UP (ref 27–34)
MCHC RBC-ENTMCNC: 33.6 GM/DL — SIGNIFICANT CHANGE UP (ref 32–36)
MCV RBC AUTO: 87.8 FL — SIGNIFICANT CHANGE UP (ref 80–100)
NRBC # BLD: 0 /100 WBCS — SIGNIFICANT CHANGE UP (ref 0–0)
PLATELET # BLD AUTO: 207 K/UL — SIGNIFICANT CHANGE UP (ref 150–400)
POTASSIUM SERPL-MCNC: 3.5 MMOL/L — SIGNIFICANT CHANGE UP (ref 3.5–5.3)
POTASSIUM SERPL-SCNC: 3.5 MMOL/L — SIGNIFICANT CHANGE UP (ref 3.5–5.3)
PROT SERPL-MCNC: 6.2 G/DL — SIGNIFICANT CHANGE UP (ref 6–8.3)
RBC # BLD: 4 M/UL — LOW (ref 4.2–5.8)
RBC # FLD: 15.5 % — HIGH (ref 10.3–14.5)
SODIUM SERPL-SCNC: 138 MMOL/L — SIGNIFICANT CHANGE UP (ref 135–145)
WBC # BLD: 8.01 K/UL — SIGNIFICANT CHANGE UP (ref 3.8–10.5)
WBC # FLD AUTO: 8.01 K/UL — SIGNIFICANT CHANGE UP (ref 3.8–10.5)

## 2024-06-24 PROCEDURE — 93306 TTE W/DOPPLER COMPLETE: CPT | Mod: 26

## 2024-06-24 RX ORDER — POTASSIUM CHLORIDE 600 MG/1
20 TABLET, FILM COATED, EXTENDED RELEASE ORAL
Refills: 0 | Status: COMPLETED | OUTPATIENT
Start: 2024-06-24 | End: 2024-06-24

## 2024-06-24 RX ADMIN — INSULIN LISPRO 5 UNIT(S): 100 INJECTION, SOLUTION SUBCUTANEOUS at 12:12

## 2024-06-24 RX ADMIN — INSULIN LISPRO 5 UNIT(S): 100 INJECTION, SOLUTION SUBCUTANEOUS at 17:59

## 2024-06-24 RX ADMIN — ISOSORBIDE DINITRATE 10 MILLIGRAM(S): 5 TABLET ORAL at 12:11

## 2024-06-24 RX ADMIN — ASPIRIN 81 MILLIGRAM(S): 325 TABLET, FILM COATED ORAL at 12:11

## 2024-06-24 RX ADMIN — Medication 2 TABLET(S): at 21:48

## 2024-06-24 RX ADMIN — HEPARIN SODIUM 600 UNIT(S)/HR: 50 INJECTION, SOLUTION INTRAVENOUS at 19:26

## 2024-06-24 RX ADMIN — INSULIN LISPRO 1: 100 INJECTION, SOLUTION SUBCUTANEOUS at 12:11

## 2024-06-24 RX ADMIN — Medication 50 MILLIGRAM(S): at 06:35

## 2024-06-24 RX ADMIN — HEPARIN SODIUM 600 UNIT(S)/HR: 50 INJECTION, SOLUTION INTRAVENOUS at 05:05

## 2024-06-24 RX ADMIN — INSULIN GLARGINE 17 UNIT(S): 100 INJECTION, SOLUTION SUBCUTANEOUS at 21:46

## 2024-06-24 RX ADMIN — POTASSIUM CHLORIDE 20 MILLIEQUIVALENT(S): 600 TABLET, FILM COATED, EXTENDED RELEASE ORAL at 14:29

## 2024-06-24 RX ADMIN — ISOSORBIDE DINITRATE 10 MILLIGRAM(S): 5 TABLET ORAL at 17:48

## 2024-06-24 RX ADMIN — ISOSORBIDE DINITRATE 10 MILLIGRAM(S): 5 TABLET ORAL at 06:35

## 2024-06-24 RX ADMIN — FUROSEMIDE 60 MILLIGRAM(S): 10 INJECTION, SOLUTION INTRAMUSCULAR; INTRAVENOUS at 06:34

## 2024-06-24 RX ADMIN — CLOPIDOGREL BISULFATE 75 MILLIGRAM(S): 75 TABLET, FILM COATED ORAL at 12:11

## 2024-06-24 RX ADMIN — POTASSIUM CHLORIDE 20 MILLIEQUIVALENT(S): 600 TABLET, FILM COATED, EXTENDED RELEASE ORAL at 12:11

## 2024-06-24 RX ADMIN — Medication 50 MILLIGRAM(S): at 17:48

## 2024-06-25 ENCOUNTER — TRANSCRIPTION ENCOUNTER (OUTPATIENT)
Age: 69
End: 2024-06-25

## 2024-06-25 VITALS
TEMPERATURE: 98 F | SYSTOLIC BLOOD PRESSURE: 117 MMHG | OXYGEN SATURATION: 97 % | HEART RATE: 73 BPM | RESPIRATION RATE: 18 BRPM | DIASTOLIC BLOOD PRESSURE: 66 MMHG

## 2024-06-25 LAB
ANION GAP SERPL CALC-SCNC: 12 MMOL/L — SIGNIFICANT CHANGE UP (ref 5–17)
APTT BLD: 49.4 SEC — HIGH (ref 24.5–35.6)
BUN SERPL-MCNC: 30 MG/DL — HIGH (ref 7–23)
CALCIUM SERPL-MCNC: 9.8 MG/DL — SIGNIFICANT CHANGE UP (ref 8.4–10.5)
CHLORIDE SERPL-SCNC: 101 MMOL/L — SIGNIFICANT CHANGE UP (ref 96–108)
CO2 SERPL-SCNC: 24 MMOL/L — SIGNIFICANT CHANGE UP (ref 22–31)
CREAT SERPL-MCNC: 1.61 MG/DL — HIGH (ref 0.5–1.3)
EGFR: 47 ML/MIN/1.73M2 — LOW
GLUCOSE BLDC GLUCOMTR-MCNC: 111 MG/DL — HIGH (ref 70–99)
GLUCOSE BLDC GLUCOMTR-MCNC: 166 MG/DL — HIGH (ref 70–99)
GLUCOSE BLDC GLUCOMTR-MCNC: 215 MG/DL — HIGH (ref 70–99)
GLUCOSE SERPL-MCNC: 103 MG/DL — HIGH (ref 70–99)
HCT VFR BLD CALC: 39 % — SIGNIFICANT CHANGE UP (ref 39–50)
HGB BLD-MCNC: 13 G/DL — SIGNIFICANT CHANGE UP (ref 13–17)
MAGNESIUM SERPL-MCNC: 2 MG/DL — SIGNIFICANT CHANGE UP (ref 1.6–2.6)
MCHC RBC-ENTMCNC: 29.7 PG — SIGNIFICANT CHANGE UP (ref 27–34)
MCHC RBC-ENTMCNC: 33.3 GM/DL — SIGNIFICANT CHANGE UP (ref 32–36)
MCV RBC AUTO: 89 FL — SIGNIFICANT CHANGE UP (ref 80–100)
NRBC # BLD: 0 /100 WBCS — SIGNIFICANT CHANGE UP (ref 0–0)
PLATELET # BLD AUTO: 221 K/UL — SIGNIFICANT CHANGE UP (ref 150–400)
POTASSIUM SERPL-MCNC: 3.8 MMOL/L — SIGNIFICANT CHANGE UP (ref 3.5–5.3)
POTASSIUM SERPL-SCNC: 3.8 MMOL/L — SIGNIFICANT CHANGE UP (ref 3.5–5.3)
RBC # BLD: 4.38 M/UL — SIGNIFICANT CHANGE UP (ref 4.2–5.8)
RBC # FLD: 15.7 % — HIGH (ref 10.3–14.5)
SODIUM SERPL-SCNC: 137 MMOL/L — SIGNIFICANT CHANGE UP (ref 135–145)
WBC # BLD: 9.91 K/UL — SIGNIFICANT CHANGE UP (ref 3.8–10.5)
WBC # FLD AUTO: 9.91 K/UL — SIGNIFICANT CHANGE UP (ref 3.8–10.5)

## 2024-06-25 RX ORDER — METOLAZONE 5 MG/1
1 TABLET ORAL
Qty: 30 | Refills: 0
Start: 2024-06-25 | End: 2024-07-24

## 2024-06-25 RX ORDER — FUROSEMIDE 10 MG/ML
40 INJECTION, SOLUTION INTRAMUSCULAR; INTRAVENOUS DAILY
Refills: 0 | Status: DISCONTINUED | OUTPATIENT
Start: 2024-06-26 | End: 2024-06-25

## 2024-06-25 RX ORDER — ISOSORBIDE DINITRATE 5 MG/1
1 TABLET ORAL
Qty: 90 | Refills: 0
Start: 2024-06-25 | End: 2024-07-24

## 2024-06-25 RX ORDER — ASPIRIN 325 MG/1
1 TABLET, FILM COATED ORAL
Qty: 30 | Refills: 0
Start: 2024-06-25 | End: 2024-07-24

## 2024-06-25 RX ORDER — FUROSEMIDE 10 MG/ML
1 INJECTION, SOLUTION INTRAMUSCULAR; INTRAVENOUS
Qty: 30 | Refills: 0
Start: 2024-06-25 | End: 2024-07-24

## 2024-06-25 RX ORDER — ISOSORBIDE DINITRATE 5 MG/1
1 TABLET ORAL
Qty: 90 | Refills: 0 | DISCHARGE

## 2024-06-25 RX ORDER — FUROSEMIDE 40 MG/1
1 TABLET ORAL
Qty: 30 | Refills: 0 | DISCHARGE
Start: 2024-06-25 | End: 2024-07-24

## 2024-06-25 RX ORDER — RIVAROXABAN 10 MG/1
15 TABLET, FILM COATED ORAL
Refills: 0 | Status: DISCONTINUED | OUTPATIENT
Start: 2024-06-25 | End: 2024-06-25

## 2024-06-25 RX ADMIN — INSULIN LISPRO 5 UNIT(S): 100 INJECTION, SOLUTION SUBCUTANEOUS at 17:34

## 2024-06-25 RX ADMIN — Medication 50 MILLIGRAM(S): at 16:49

## 2024-06-25 RX ADMIN — INSULIN LISPRO 5 UNIT(S): 100 INJECTION, SOLUTION SUBCUTANEOUS at 12:20

## 2024-06-25 RX ADMIN — INSULIN LISPRO 2: 100 INJECTION, SOLUTION SUBCUTANEOUS at 12:19

## 2024-06-25 RX ADMIN — HEPARIN SODIUM 600 UNIT(S)/HR: 50 INJECTION, SOLUTION INTRAVENOUS at 06:07

## 2024-06-25 RX ADMIN — ISOSORBIDE DINITRATE 10 MILLIGRAM(S): 5 TABLET ORAL at 05:37

## 2024-06-25 RX ADMIN — ASPIRIN 81 MILLIGRAM(S): 325 TABLET, FILM COATED ORAL at 12:20

## 2024-06-25 RX ADMIN — INSULIN LISPRO 5 UNIT(S): 100 INJECTION, SOLUTION SUBCUTANEOUS at 08:17

## 2024-06-25 RX ADMIN — INSULIN LISPRO 1: 100 INJECTION, SOLUTION SUBCUTANEOUS at 17:32

## 2024-06-25 RX ADMIN — Medication 50 MILLIGRAM(S): at 05:37

## 2024-06-25 RX ADMIN — RIVAROXABAN 15 MILLIGRAM(S): 10 TABLET, FILM COATED ORAL at 17:32

## 2024-06-25 RX ADMIN — FUROSEMIDE 60 MILLIGRAM(S): 10 INJECTION, SOLUTION INTRAMUSCULAR; INTRAVENOUS at 05:37

## 2024-06-25 RX ADMIN — ISOSORBIDE DINITRATE 10 MILLIGRAM(S): 5 TABLET ORAL at 16:49

## 2024-06-25 RX ADMIN — HEPARIN SODIUM 700 UNIT(S)/HR: 50 INJECTION, SOLUTION INTRAVENOUS at 07:56

## 2024-06-25 RX ADMIN — CLOPIDOGREL BISULFATE 75 MILLIGRAM(S): 75 TABLET, FILM COATED ORAL at 12:19

## 2024-06-25 RX ADMIN — ISOSORBIDE DINITRATE 10 MILLIGRAM(S): 5 TABLET ORAL at 12:19

## 2024-06-26 LAB
HCT VFR BLD CALC: 32.6 % — LOW (ref 39–50)
HGB BLD-MCNC: 10.4 G/DL — LOW (ref 13–17)
MCHC RBC-ENTMCNC: 30.1 PG — SIGNIFICANT CHANGE UP (ref 27–34)
MCHC RBC-ENTMCNC: 31.9 GM/DL — LOW (ref 32–36)
MCV RBC AUTO: 94.2 FL — SIGNIFICANT CHANGE UP (ref 80–100)
NRBC # BLD: 0 /100 WBCS — SIGNIFICANT CHANGE UP (ref 0–0)
PLATELET # BLD AUTO: 90 K/UL — LOW (ref 150–400)
RBC # BLD: 3.46 M/UL — LOW (ref 4.2–5.8)
RBC # FLD: 15.2 % — HIGH (ref 10.3–14.5)
WBC # BLD: 7.77 K/UL — SIGNIFICANT CHANGE UP (ref 3.8–10.5)
WBC # FLD AUTO: 7.77 K/UL — SIGNIFICANT CHANGE UP (ref 3.8–10.5)

## 2024-06-26 PROCEDURE — 82550 ASSAY OF CK (CPK): CPT

## 2024-06-26 PROCEDURE — 99285 EMERGENCY DEPT VISIT HI MDM: CPT

## 2024-06-26 PROCEDURE — 83036 HEMOGLOBIN GLYCOSYLATED A1C: CPT

## 2024-06-26 PROCEDURE — 71046 X-RAY EXAM CHEST 2 VIEWS: CPT

## 2024-06-26 PROCEDURE — 85025 COMPLETE CBC W/AUTO DIFF WBC: CPT

## 2024-06-26 PROCEDURE — 80048 BASIC METABOLIC PNL TOTAL CA: CPT

## 2024-06-26 PROCEDURE — 84484 ASSAY OF TROPONIN QUANT: CPT

## 2024-06-26 PROCEDURE — 80053 COMPREHEN METABOLIC PANEL: CPT

## 2024-06-26 PROCEDURE — 83605 ASSAY OF LACTIC ACID: CPT

## 2024-06-26 PROCEDURE — 85014 HEMATOCRIT: CPT

## 2024-06-26 PROCEDURE — 82947 ASSAY GLUCOSE BLOOD QUANT: CPT

## 2024-06-26 PROCEDURE — 85730 THROMBOPLASTIN TIME PARTIAL: CPT

## 2024-06-26 PROCEDURE — 85027 COMPLETE CBC AUTOMATED: CPT

## 2024-06-26 PROCEDURE — 82803 BLOOD GASES ANY COMBINATION: CPT

## 2024-06-26 PROCEDURE — 82435 ASSAY OF BLOOD CHLORIDE: CPT

## 2024-06-26 PROCEDURE — 83880 ASSAY OF NATRIURETIC PEPTIDE: CPT

## 2024-06-26 PROCEDURE — 84132 ASSAY OF SERUM POTASSIUM: CPT

## 2024-06-26 PROCEDURE — 36415 COLL VENOUS BLD VENIPUNCTURE: CPT

## 2024-06-26 PROCEDURE — 83735 ASSAY OF MAGNESIUM: CPT

## 2024-06-26 PROCEDURE — 84295 ASSAY OF SERUM SODIUM: CPT

## 2024-06-26 PROCEDURE — 93306 TTE W/DOPPLER COMPLETE: CPT

## 2024-06-26 PROCEDURE — 85610 PROTHROMBIN TIME: CPT

## 2024-06-26 PROCEDURE — 82330 ASSAY OF CALCIUM: CPT

## 2024-06-26 PROCEDURE — 85018 HEMOGLOBIN: CPT

## 2024-06-26 PROCEDURE — 82962 GLUCOSE BLOOD TEST: CPT

## 2024-06-26 PROCEDURE — 82553 CREATINE MB FRACTION: CPT

## 2024-07-16 NOTE — DISCHARGE NOTE PROVIDER - HOSPITAL COURSE
Pt called in still in pain , needs a referral please advise.   HPI:  68 y/o male with hx of CAD s/p multiple  stents (most recent to pLM (70%), pLAD, mLAD on 1/8/24 with Dr. Dominguez), HFrEF (EF~50% as of 11/26/23), A-fib, HTN, HLD, T2DM, CKD3, former smoke a/w worsening sob.  Pt reports that he had ran out of eliquis  few  weeks ago and restarted ( was off approximately two weeks )   around the same time pt was c, cough , chills and dyspnea : was given a course of abx however wiht no imporvment ..  his Dysnpea worsened to a point where it was evident on minimal exertion.  he still gets exertional CP however not as servere and does not need nitro SL as often   no orhtopnea  or PND   not sure if worsening edema   no fever or chills over the past few days  no N/V/D/urinary sx    (16 Apr 2024 11:38)    Hospital Course    Dyspnea:   check VA duplex r/o dvt and Vq  r/o PE given pt was off eliquis for over a week : bnoth of which are negative   no pna no cxr   consider ct chest r/o pna   however no fever and no elevated WBC       hx of CHF :  with clear lungs and worsening edema of LE   iv diuretics for acute on chronic CHF Diastolic : change to po in AM     DM: uncontrolled   insulin and consulted endo     Afib :  start heparin   cont rate control      BARRY: fu with renal       NSTEMI  : reports that his CP is on exertion, seems to be requiring less nitro   cath noted : stent placed      Advanced Directives:   [x] Full code  [ ] DNR  [ ] Hospice    Discharge Diagnoses:  (Admit Diagnosis) Chest pain  (PMH) CHF (congestive heart failure)  (PMH) Stented coronary artery  (PMH) Hematoma of leg  (PMH) Afib  (PMH) CAD in native artery  (PMH) Type 2 diabetes mellitus  (PMH) Hyperlipidemia, unspecified hyperlipidemia type  (PMH) Essential hypertension, hypertension with unspecified goal  (PMH) Former smoker  (Principal DC/DX) Chest pain  (Problem/DX) HTN (hypertension)  (Problem/DX) DM2 (diabetes mellitus, type 2)  (Problem/DX) CHF (congestive heart failure)  (PSH) s/p Carotid Endarterectomy

## 2024-07-23 NOTE — H&P ADULT - RESPIRATORY
Patient stated that she wasn't checking at home often but will take it when she gets home and give us a call back tomorrow.    Breath Sounds equal & clear to percussion & auscultation, no accessory muscle use

## 2024-08-12 NOTE — PATIENT PROFILE ADULT - HOW PATIENT ADDRESSED, PROFILE
Chief Complaint   Patient presents with    Follow-up     Cardiac management    Pacemaker Check     Last [employer] interrogation 3/1/23 and last remote check 7/18/24.    Lab     Last labs 8/8/24 per PCP. Reports having echo at [facility] in April, attempting to obtain.    Shortness of Breath     Increased shortness of breath for the last 1 1/2 weeks. PCP did start Cefdinir, she reports no relief. Wears O2 at 3 L/M BN. She has also been using inhalers with no relief.     Chest Pain     Having sharp pain in mid chest and feels like someone sitting on chest, lasting 20 minutes. Has noticed the intermittent pain for the last week.    Dizziness     Has noticed for the last week. Relieved with rest.     Med Refill     PCP writes refills on medications.       CARDIAC COMPLAINTS  chest pressure/discomfort, dyspnea, Dizziness, and fatigue        Subjective   Estefany Castañeda is a 64 y.o. female came in today for her follow-up visit.  She was last seen here in February 2023.  She has history of aortic valve disease for which she underwent tissue valve replacement in 95.  Since then she has undergone multiple echocardiogram and it showed gradually the valve is getting more stenosed.  She then underwent stenting of the right coronary artery in February 2022 and in September also.  She also developed syncopal episode and AV block for which she had a [employer] pacemaker placed in September.  Because of the severe stenosis she was referred to  and had a mechanical valve placed along with SVG to PDA.  She apparently got admitted to the hospital in April with sudden onset of increasing shortness of breath hypertensive emergency.  Her echocardiogram showed functioning aortic valve.  Changes with medication was done and was sent home.  She came today stating that she still continues to have shortness of breath.  She is now using oxygen 3 L constantly.  She started noticing chest pain in the form of someone sitting on her chest lasting for  Jean [place] 20 minutes.  Her last [employer] remote monitoring showed she is pacing in the ventricle almost 99%.              Cardiac History  Past Surgical History:   Procedure Laterality Date    AORTIC VALVE REPAIR/REPLACEMENT  09/1995    [employer]     AORTIC VALVE SURGERY  11/07/2022    AVR # 23 [employer] mechanical, SVG to PDA    CARDIAC CATHETERIZATION  07/18/2009    () Normal Coronaries. Acute takeoff of the LM.     CARDIAC CATHETERIZATION  02/18/2022    95% RCA- 3.5x22 Resolute    CARDIAC CATHETERIZATION  09/16/2022    100% RCA Stent- 4.0x22 & 2.5x8 Resolute. AO Root dilated    CARDIOVASCULAR STRESS TEST  05/19/2016    EF 65%, no ischemia    CARDIOVASCULAR STRESS TEST  05/16/2019    L.Cardiolite- EF 65%. Negative.    CARDIOVASCULAR STRESS TEST  04/28/2021    Lexiscan- EF 58%. BP- 170/76. Apical Infarct Vs Thinning    ECHO - CONVERTED  05/19/2016    EF 50-55%, mod AS, AVR well seated and well functioning    ECHO - CONVERTED  12/08/2016    EF 55-60%. GEENA- 1.5 Cm2. Gr- 34 mmHg. RVSP 23 mmHg    ECHO - CONVERTED  05/16/2019    EF 65%. AVR. GEENA- 1.4 Cm2. Gr- 25 mmHg. Mild MR. RVSP- 24 mmHg.    ECHO - CONVERTED  03/25/2020    EF 60%. AVR. GEENA- 1.2 Cm2. 17/29 mmHg. Mild MR & AI.    ECHO - CONVERTED  04/28/2021    EF 60%. AVR. GEENA- 1.1 Cm2. 12/23 mmHg. Mild MR. RVSP- 24 mmHg    ECHO - CONVERTED  02/02/2022    EF 60%. AVR, GEENA- 0.98. 16/30. Mild MR & AI. AO- 4.3     ECHO - CONVERTED  04/26/2024    @ [facility].- EF 50%. Mild MR. AVR. RVSP- 35 mmHg    OTHER SURGICAL HISTORY  02/17/2022    CTA chest:  stable 4.2cm thoracic aneurysm    OTHER SURGICAL HISTORY  04/26/2024    RA US- > 60% (L) Renal artery    PACEMAKER IMPLANTATION  09/19/2022    [employer] by Dr. Villalobos     CAROTID UNILATERAL  11/17/2016    bilateral- no hemodynamically significant stenosis       Current Outpatient Medications   Medication Sig Dispense Refill    albuterol sulfate  (90 Base) MCG/ACT inhaler Inhale 2 puffs Every 4 (Four) Hours As Needed  for Wheezing.      Cariprazine HCl (Vraylar) 1.5 MG capsule capsule Take 1 capsule by mouth Daily.      diphenhydrAMINE (BENADRYL) 25 MG tablet Take 2 tablets by mouth 2 (Two) Times a Day.      escitalopram (LEXAPRO) 20 MG tablet Take 1 tablet by mouth Daily.      ezetimibe (ZETIA) 10 MG tablet TAKE 1 TABLET BY MOUTH DAILY 90 tablet 0    fenofibrate 160 MG tablet Take 1 tablet by mouth Daily. 90 tablet 2    glyburide (DIAbeta) 5 MG tablet Take 1 tablet by mouth 2 (Two) Times a Day With Meals.      HYDROcodone-acetaminophen (NORCO) 5-325 MG per tablet Take 1 tablet by mouth 3 times a day.      insulin glargine (LANTUS, SEMGLEE) 100 UNIT/ML injection Inject  under the skin into the appropriate area as directed Daily.      insulin regular (humuLIN R,novoLIN R) 100 UNIT/ML injection Inject  under the skin into the appropriate area as directed 3 (Three) Times a Day Before Meals.      losartan (COZAAR) 50 MG tablet TAKE 1 TABLET BY MOUTH EVERY DAY 90 tablet 1    metFORMIN ER (GLUCOPHAGE-XR) 500 MG 24 hr tablet Take 1 tablet by mouth Daily With Breakfast.      metoprolol tartrate (LOPRESSOR) 50 MG tablet Take 1 tablet by mouth 2 (Two) Times a Day. 180 tablet 2    nitroglycerin (Nitrolingual) 0.4 MG/SPRAY spray Place 1 spray under the tongue Every 5 (Five) Minutes As Needed for Chest Pain. 1 each 1    O2 (OXYGEN) Inhale 3 L/min Daily.      oxybutynin XL (DITROPAN-XL) 10 MG 24 hr tablet Take 1 tablet by mouth Daily.      pravastatin (PRAVACHOL) 20 MG tablet Take 1 tablet by mouth Daily. 90 tablet 2    Spiriva Respimat 2.5 MCG/ACT aerosol solution inhaler Daily As Needed.      traZODone (DESYREL) 100 MG tablet Take 1 tablet by mouth Every Night.      warfarin (COUMADIN) 5 MG tablet TAKE 1 TABLET BY MOUTH ON MONDAY, WEDNESDAY, FRIDAY, AND SUNDAY, TAKE 1/2 TABLET ON TUESDAY, THURSAY, AND SATURDAY 90 tablet 1    aspirin 81 MG EC tablet Take 1 tablet by mouth Daily. 90 tablet 3    isosorbide mononitrate (IMDUR) 30 MG 24 hr tablet  Take 1 tablet by mouth Daily. 90 tablet 3     No current facility-administered medications for this visit.       Allergies  :  Capsaicin, Crestor [rosuvastatin calcium], Demerol [meperidine], Ibuprofen, and Lipitor [atorvastatin]       Past Medical History:   Diagnosis Date    Aneurysm      clipping    Anxiety     COPD (chronic obstructive pulmonary disease)     CVA (cerebral vascular accident)     Depression     Diabetes mellitus     GERD (gastroesophageal reflux disease)     Hx of appendectomy 12/2017    Hypercholesterolemia     Hypertension     Mechanical heart valve present     Myocardial infarction        Social History     Socioeconomic History    Marital status:    Tobacco Use    Smoking status: Former     Current packs/day: 0.00     Types: Cigarettes     Quit date: 2009     Years since quitting: 15.6     Passive exposure: Current    Smokeless tobacco: Never    Tobacco comments:     Quit 6 years ago   Vaping Use    Vaping status: Never Used   Substance and Sexual Activity    Alcohol use: Not Currently    Drug use: No    Sexual activity: Defer       Family History   Problem Relation Age of Onset    COPD Mother        Review of Systems   Constitutional: Positive for malaise/fatigue. Negative for decreased appetite.   HENT:  Negative for congestion and sore throat.    Eyes:  Negative for blurred vision, double vision and visual disturbance.   Cardiovascular:  Positive for chest pain and dyspnea on exertion.   Respiratory:  Positive for shortness of breath. Negative for snoring.    Endocrine: Negative for cold intolerance and heat intolerance.   Hematologic/Lymphatic: Negative for adenopathy. Does not bruise/bleed easily.   Skin:  Negative for itching, nail changes and skin cancer.   Musculoskeletal:  Negative for arthritis and myalgias.   Gastrointestinal:  Negative for abdominal pain, dysphagia and heartburn.   Genitourinary:  Negative for bladder incontinence and frequency.   Neurological:  Negative for  "dizziness, seizures and vertigo.   Psychiatric/Behavioral:  Negative for altered mental status.    Allergic/Immunologic: Negative for environmental allergies and hives.       Diabetes- Yes  Thyroid- normal    Objective     /80 (BP Location: Left arm, Patient Position: Sitting)   Pulse 98   Ht 154.9 cm (60.98\")   Wt 64.2 kg (141 lb 9.6 oz)   BMI 26.77 kg/m²     Vitals and nursing note reviewed.   Constitutional:       Appearance: Healthy appearance. Not in distress.   Eyes:      Conjunctiva/sclera: Conjunctivae normal.      Pupils: Pupils are equal, round, and reactive to light.   HENT:      Head: Normocephalic.   Pulmonary:      Effort: Pulmonary effort is normal.      Breath sounds: Normal breath sounds.   Cardiovascular:      PMI at left midclavicular line. Normal rate. Regular rhythm. mechanical S2.       Murmurs: There is a grade 3/6 high frequency blowing holosystolic murmur at the apex.   Abdominal:      General: Bowel sounds are normal.      Palpations: Abdomen is soft.   Musculoskeletal: Normal range of motion.      Cervical back: Normal range of motion and neck supple. Skin:     General: Skin is warm and dry.   Neurological:      Mental Status: Alert, oriented to person, place, and time and oriented to person, place and time.         ECG 12 Lead    Date/Time: 8/12/2024 1:12 PM  Performed by: Joelle Jay MD    Authorized by: Joelle Jay MD  Comparison: compared with previous ECG from 2/27/2023  Comparison to previous ECG: Rate is better  Rhythm: paced  Rate: normal  QRS axis: normal    Clinical impression: abnormal EKG                  @ASSESSMENT/PLAN@  BMI is >= 25 and <30. (Overweight) The following options were offered after discussion;: exercise counseling/recommendations and nutrition counseling/recommendations     Diagnoses and all orders for this visit:    1. IHD (ischemic heart disease) (Primary)  -     aspirin 81 MG EC tablet; Take 1 tablet by mouth Daily.  Dispense: 90 " tablet; Refill: 3  -     isosorbide mononitrate (IMDUR) 30 MG 24 hr tablet; Take 1 tablet by mouth Daily.  Dispense: 90 tablet; Refill: 3  -     Stress Test With Myocardial Perfusion One Day; Future  -     CBC & Differential; Future    2. Essential hypertension  -     Comprehensive Metabolic Panel; Future    3. Type 2 diabetes mellitus with complication, with long-term current use of insulin  -     TSH; Future  -     Hemoglobin A1c; Future    4. SSS (sick sinus syndrome)  -     TSH; Future    5. Chest pressure  -     isosorbide mononitrate (IMDUR) 30 MG 24 hr tablet; Take 1 tablet by mouth Daily.  Dispense: 90 tablet; Refill: 3  -     Stress Test With Myocardial Perfusion One Day; Future    6. Shortness of breath    7. Dizziness    8. Hypercholesterolemia  -     Lipid Panel; Future    Other orders  -     ECG 12 Lead    At baseline her heart rate is upper limit of normal.  Her blood pressure is stable.  Her EKG showed paced rhythm with a ventricle.  Her clinical examination reveals a BMI of 27.  Her cardiovascular examination reveals mechanical second heart sound and a short systolic murmur at the mitral area    Regarding her ischemic heart disease, she has undergone stenting of the RCA multiple times and also bypass surgery to RCA.  She now having more chest pain.  I advised her to restart aspirin at 81 mg once a day.  I also gave her prescription for isosorbide at 30 mg once a day.  I scheduled her to undergo stress test in the form of Lexiscan to rule out any ischemia causing the chest pain.    Regarding her hypertension, it seems to be controlled with a combination of Cozaar and metoprolol.  If she continues to have elevated blood pressure, she may need renal angiogram and possible stenting of the left renal artery.    Regarding her diabetes, she is on insulin and metformin.  Need to recheck her labs including A1c    Regarding sick sinus syndrome/AV block.  I like to get pacemaker interrogated to rule out any  PMT.  If no other arrhythmia noted then I like to increase the dose of Lopressor to 100 mg.    Regarding hypercholesterolemia, she is on Zetia and Pravachol.  Will recheck the levels again.    Regarding the chest pressure, need to rule out ischemic heart disease.  Will get stress test and start her on Imdur    Regarding the shortness of breath, at this time she is on oxygen and is being followed by the pulmonologist    Regarding dizziness, it could be related to her heart rate.    Based on the results, further recommendations will be                    Electronically signed by Joelle Jay MD August 12, 2024 13:13 EDT

## 2024-11-30 ENCOUNTER — NON-APPOINTMENT (OUTPATIENT)
Age: 69
End: 2024-11-30

## 2024-12-03 ENCOUNTER — INPATIENT (INPATIENT)
Facility: HOSPITAL | Age: 69
LOS: 2 days | Discharge: HOME | DRG: 641 | End: 2024-12-06
Attending: GENERAL ACUTE CARE HOSPITAL | Admitting: GENERAL ACUTE CARE HOSPITAL
Payer: COMMERCIAL

## 2024-12-03 VITALS
WEIGHT: 130.07 LBS | TEMPERATURE: 98 F | SYSTOLIC BLOOD PRESSURE: 132 MMHG | OXYGEN SATURATION: 99 % | DIASTOLIC BLOOD PRESSURE: 70 MMHG | HEART RATE: 85 BPM | RESPIRATION RATE: 18 BRPM | HEIGHT: 66 IN

## 2024-12-03 DIAGNOSIS — E87.6 HYPOKALEMIA: ICD-10-CM

## 2024-12-03 LAB
ADD ON TEST-SPECIMEN IN LAB: SIGNIFICANT CHANGE UP
ALBUMIN SERPL ELPH-MCNC: 4.4 G/DL — SIGNIFICANT CHANGE UP (ref 3.3–5)
ALP SERPL-CCNC: 151 U/L — HIGH (ref 40–120)
ALT FLD-CCNC: 20 U/L — SIGNIFICANT CHANGE UP (ref 10–45)
ANION GAP SERPL CALC-SCNC: 14 MMOL/L — SIGNIFICANT CHANGE UP (ref 5–17)
ANION GAP SERPL CALC-SCNC: 21 MMOL/L — HIGH (ref 5–17)
APTT BLD: 46.8 SEC — HIGH (ref 24.5–35.6)
AST SERPL-CCNC: 37 U/L — SIGNIFICANT CHANGE UP (ref 10–40)
BASOPHILS # BLD AUTO: 0.04 K/UL — SIGNIFICANT CHANGE UP (ref 0–0.2)
BASOPHILS NFR BLD AUTO: 0.3 % — SIGNIFICANT CHANGE UP (ref 0–2)
BILIRUB SERPL-MCNC: 1.5 MG/DL — HIGH (ref 0.2–1.2)
BUN SERPL-MCNC: 89 MG/DL — HIGH (ref 7–23)
BUN SERPL-MCNC: 97 MG/DL — HIGH (ref 7–23)
CALCIUM SERPL-MCNC: 9.4 MG/DL — SIGNIFICANT CHANGE UP (ref 8.4–10.5)
CALCIUM SERPL-MCNC: 9.8 MG/DL — SIGNIFICANT CHANGE UP (ref 8.4–10.5)
CHLORIDE SERPL-SCNC: 86 MMOL/L — LOW (ref 96–108)
CHLORIDE SERPL-SCNC: 89 MMOL/L — LOW (ref 96–108)
CK SERPL-CCNC: 298 U/L — HIGH (ref 30–200)
CO2 SERPL-SCNC: 27 MMOL/L — SIGNIFICANT CHANGE UP (ref 22–31)
CO2 SERPL-SCNC: 29 MMOL/L — SIGNIFICANT CHANGE UP (ref 22–31)
CREAT SERPL-MCNC: 2.41 MG/DL — HIGH (ref 0.5–1.3)
CREAT SERPL-MCNC: 2.54 MG/DL — HIGH (ref 0.5–1.3)
EGFR: 27 ML/MIN/1.73M2 — LOW
EGFR: 29 ML/MIN/1.73M2 — LOW
EOSINOPHIL # BLD AUTO: 0.05 K/UL — SIGNIFICANT CHANGE UP (ref 0–0.5)
EOSINOPHIL NFR BLD AUTO: 0.4 % — SIGNIFICANT CHANGE UP (ref 0–6)
GLUCOSE BLDC GLUCOMTR-MCNC: 317 MG/DL — HIGH (ref 70–99)
GLUCOSE BLDC GLUCOMTR-MCNC: 372 MG/DL — HIGH (ref 70–99)
GLUCOSE BLDC GLUCOMTR-MCNC: 435 MG/DL — HIGH (ref 70–99)
GLUCOSE SERPL-MCNC: 155 MG/DL — HIGH (ref 70–99)
GLUCOSE SERPL-MCNC: 441 MG/DL — HIGH (ref 70–99)
HCT VFR BLD CALC: 45.6 % — SIGNIFICANT CHANGE UP (ref 39–50)
HGB BLD-MCNC: 15.9 G/DL — SIGNIFICANT CHANGE UP (ref 13–17)
IMM GRANULOCYTES NFR BLD AUTO: 0.6 % — SIGNIFICANT CHANGE UP (ref 0–0.9)
INR BLD: 2.61 RATIO — HIGH (ref 0.85–1.16)
LYMPHOCYTES # BLD AUTO: 0.97 K/UL — LOW (ref 1–3.3)
LYMPHOCYTES # BLD AUTO: 8 % — LOW (ref 13–44)
MAGNESIUM SERPL-MCNC: 2.9 MG/DL — HIGH (ref 1.6–2.6)
MCHC RBC-ENTMCNC: 32 PG — SIGNIFICANT CHANGE UP (ref 27–34)
MCHC RBC-ENTMCNC: 34.9 G/DL — SIGNIFICANT CHANGE UP (ref 32–36)
MCV RBC AUTO: 91.8 FL — SIGNIFICANT CHANGE UP (ref 80–100)
MONOCYTES # BLD AUTO: 1.09 K/UL — HIGH (ref 0–0.9)
MONOCYTES NFR BLD AUTO: 9 % — SIGNIFICANT CHANGE UP (ref 2–14)
NEUTROPHILS # BLD AUTO: 9.95 K/UL — HIGH (ref 1.8–7.4)
NEUTROPHILS NFR BLD AUTO: 81.7 % — HIGH (ref 43–77)
NRBC # BLD: 0 /100 WBCS — SIGNIFICANT CHANGE UP (ref 0–0)
PHOSPHATE SERPL-MCNC: 4.9 MG/DL — HIGH (ref 2.5–4.5)
PLATELET # BLD AUTO: 322 K/UL — SIGNIFICANT CHANGE UP (ref 150–400)
POTASSIUM SERPL-MCNC: 2.6 MMOL/L — CRITICAL LOW (ref 3.5–5.3)
POTASSIUM SERPL-MCNC: 3.8 MMOL/L — SIGNIFICANT CHANGE UP (ref 3.5–5.3)
POTASSIUM SERPL-SCNC: 2.6 MMOL/L — CRITICAL LOW (ref 3.5–5.3)
POTASSIUM SERPL-SCNC: 3.8 MMOL/L — SIGNIFICANT CHANGE UP (ref 3.5–5.3)
PROT SERPL-MCNC: 7.6 G/DL — SIGNIFICANT CHANGE UP (ref 6–8.3)
PROTHROM AB SERPL-ACNC: 29.7 SEC — HIGH (ref 9.9–13.4)
RBC # BLD: 4.97 M/UL — SIGNIFICANT CHANGE UP (ref 4.2–5.8)
RBC # FLD: 13.8 % — SIGNIFICANT CHANGE UP (ref 10.3–14.5)
SODIUM SERPL-SCNC: 132 MMOL/L — LOW (ref 135–145)
SODIUM SERPL-SCNC: 134 MMOL/L — LOW (ref 135–145)
WBC # BLD: 12.17 K/UL — HIGH (ref 3.8–10.5)
WBC # FLD AUTO: 12.17 K/UL — HIGH (ref 3.8–10.5)

## 2024-12-03 PROCEDURE — 99222 1ST HOSP IP/OBS MODERATE 55: CPT

## 2024-12-03 PROCEDURE — 99285 EMERGENCY DEPT VISIT HI MDM: CPT

## 2024-12-03 PROCEDURE — 93925 LOWER EXTREMITY STUDY: CPT | Mod: 26

## 2024-12-03 PROCEDURE — 99221 1ST HOSP IP/OBS SF/LOW 40: CPT

## 2024-12-03 RX ORDER — 0.9 % SODIUM CHLORIDE 0.9 %
1000 INTRAVENOUS SOLUTION INTRAVENOUS
Refills: 0 | Status: DISCONTINUED | OUTPATIENT
Start: 2024-12-03 | End: 2024-12-06

## 2024-12-03 RX ORDER — GLUCAGON INJECTION, SOLUTION 0.5 MG/.1ML
1 INJECTION, SOLUTION SUBCUTANEOUS ONCE
Refills: 0 | Status: DISCONTINUED | OUTPATIENT
Start: 2024-12-03 | End: 2024-12-06

## 2024-12-03 RX ORDER — DAPAGLIFLOZIN 10 MG/1
1 TABLET, FILM COATED ORAL
Refills: 0 | DISCHARGE

## 2024-12-03 RX ORDER — HEPARIN SODIUM,PORCINE 1000/ML
2000 VIAL (ML) INJECTION EVERY 6 HOURS
Refills: 0 | Status: DISCONTINUED | OUTPATIENT
Start: 2024-12-03 | End: 2024-12-06

## 2024-12-03 RX ORDER — POTASSIUM CHLORIDE 600 MG/1
40 TABLET, EXTENDED RELEASE ORAL ONCE
Refills: 0 | Status: COMPLETED | OUTPATIENT
Start: 2024-12-03 | End: 2024-12-03

## 2024-12-03 RX ORDER — METOPROLOL TARTRATE 100 MG/1
50 TABLET, FILM COATED ORAL DAILY
Refills: 0 | Status: DISCONTINUED | OUTPATIENT
Start: 2024-12-03 | End: 2024-12-06

## 2024-12-03 RX ORDER — ISOSORBIDE DINITRATE 40 MG/1
1 TABLET ORAL
Refills: 0 | DISCHARGE

## 2024-12-03 RX ORDER — ACETAMINOPHEN 500MG 500 MG/1
2 TABLET, COATED ORAL
Refills: 0 | DISCHARGE

## 2024-12-03 RX ORDER — ACETAMINOPHEN 500MG 500 MG/1
650 TABLET, COATED ORAL EVERY 6 HOURS
Refills: 0 | Status: DISCONTINUED | OUTPATIENT
Start: 2024-12-03 | End: 2024-12-06

## 2024-12-03 RX ORDER — HEPARIN SODIUM,PORCINE 1000/ML
VIAL (ML) INJECTION
Qty: 25000 | Refills: 0 | Status: DISCONTINUED | OUTPATIENT
Start: 2024-12-03 | End: 2024-12-06

## 2024-12-03 RX ORDER — ISOSORBIDE DINITRATE 40 MG/1
30 TABLET ORAL THREE TIMES A DAY
Refills: 0 | Status: DISCONTINUED | OUTPATIENT
Start: 2024-12-03 | End: 2024-12-06

## 2024-12-03 RX ORDER — INSULIN GLARGINE 100 [IU]/ML
20 INJECTION, SOLUTION SUBCUTANEOUS AT BEDTIME
Refills: 0 | Status: DISCONTINUED | OUTPATIENT
Start: 2024-12-03 | End: 2024-12-04

## 2024-12-03 RX ORDER — HEPARIN SODIUM,PORCINE 1000/ML
4500 VIAL (ML) INJECTION EVERY 6 HOURS
Refills: 0 | Status: DISCONTINUED | OUTPATIENT
Start: 2024-12-03 | End: 2024-12-06

## 2024-12-03 RX ORDER — HEPARIN SODIUM,PORCINE 1000/ML
4500 VIAL (ML) INJECTION ONCE
Refills: 0 | Status: COMPLETED | OUTPATIENT
Start: 2024-12-03 | End: 2024-12-03

## 2024-12-03 RX ORDER — POTASSIUM CHLORIDE 600 MG/1
10 TABLET, EXTENDED RELEASE ORAL ONCE
Refills: 0 | Status: COMPLETED | OUTPATIENT
Start: 2024-12-03 | End: 2024-12-03

## 2024-12-03 RX ADMIN — POTASSIUM CHLORIDE 100 MILLIEQUIVALENT(S): 600 TABLET, EXTENDED RELEASE ORAL at 13:44

## 2024-12-03 RX ADMIN — Medication 1100 UNIT(S)/HR: at 19:39

## 2024-12-03 RX ADMIN — Medication 25 GRAM(S): at 14:55

## 2024-12-03 RX ADMIN — POTASSIUM CHLORIDE 40 MILLIEQUIVALENT(S): 600 TABLET, EXTENDED RELEASE ORAL at 16:11

## 2024-12-03 RX ADMIN — Medication 2: at 22:13

## 2024-12-03 RX ADMIN — Medication 4500 UNIT(S): at 18:23

## 2024-12-03 RX ADMIN — INSULIN GLARGINE 20 UNIT(S): 100 INJECTION, SOLUTION SUBCUTANEOUS at 22:13

## 2024-12-03 RX ADMIN — Medication 5: at 18:19

## 2024-12-03 RX ADMIN — Medication 1100 UNIT(S)/HR: at 18:22

## 2024-12-03 RX ADMIN — ISOSORBIDE DINITRATE 30 MILLIGRAM(S): 40 TABLET ORAL at 22:22

## 2024-12-03 RX ADMIN — POTASSIUM CHLORIDE 100 MILLIEQUIVALENT(S): 600 TABLET, EXTENDED RELEASE ORAL at 14:54

## 2024-12-03 RX ADMIN — POTASSIUM CHLORIDE 40 MILLIEQUIVALENT(S): 600 TABLET, EXTENDED RELEASE ORAL at 13:43

## 2024-12-03 NOTE — ED CLERICAL - NS ED CLERK NOTE PRE-ARRIVAL INFORMATION; ADDITIONAL PRE-ARRIVAL INFORMATION
CC/Reason For referral: potassium 2.4  Preferred Consultant(if applicable):  Who admits for you (if needed):  Do you have documents you would like to fax over?  Would you still like to speak to an ED attending? please call after patient is evaluated

## 2024-12-03 NOTE — ED ADULT TRIAGE NOTE - CHIEF COMPLAINT QUOTE
Low K on blood work drawn yesterday, 2.4  Dense chest pain, palpations   Was seen at cards office, with complaints of 3 days to check his leg circulation,

## 2024-12-03 NOTE — ED ADULT TRIAGE NOTE - PRO INTERPRETER NEED 2
Patient: Margret Alves    Procedure(s):  RIGHT LIMITED THORACOTOMY  RIGHT LOWER LOBECTOMY, MEDIASTINAL LYMPH NODE DISSECTION    Diagnosis: Primary adenocarcinoma of lower lobe of right lung (H) [C34.31]  Diagnosis Additional Information: No value filed.    Anesthesia Type:   General     Note:    Oropharynx: oropharynx clear of all foreign objects  Level of Consciousness: awake  Oxygen Supplementation: face mask  Level of Supplemental Oxygen (L/min / FiO2): 6  Independent Airway: airway patency satisfactory and stable  Dentition: dentition unchanged  Vital Signs Stable: post-procedure vital signs reviewed and stable  Report to RN Given: handoff report given  Patient transferred to: PACU    Handoff Report: Identifed the Patient, Identified the Reponsible Provider, Reviewed the pertinent medical history, Discussed the surgical course, Reviewed Intra-OP anesthesia mangement and issues during anesthesia, Set expectations for post-procedure period and Allowed opportunity for questions and acknowledgement of understanding      Vitals:  Vitals Value Taken Time   /69    Temp     Pulse 72 08/31/21 0946   Resp 9 08/31/21 0946   SpO2 100 % 08/31/21 0946   Vitals shown include unvalidated device data.    Electronically Signed By: ERICA Barone CRNA  August 31, 2021  9:47 AM  
English

## 2024-12-03 NOTE — ED PROVIDER NOTE - ATTENDING CONTRIBUTION TO CARE
Emergency Medicine Attending MD Grayson:  patient seen and evaluated with the resident.  I was present for key portions of the History & Physical, and I agree with the Impression & Plan.    Patient is a 69-year-old male brought in by family for evaluation of hyperkalemia identified on outpatient labs yesterday.  Patient was being evaluated by his PMD for atraumatic right lower extremity pain.  Patient has been having this pain on an intermittently, once per week, for the last 2 months.      Per HIE, patient has had chronic PAD in RLE for years.  VA Duplex BLE, performed 4/2026, showed:  -Stenosis/occlusion of the right popliteal artery with markedly diminished arterial blood flow velocities in the infrapopliteal vessels.  -High-grade stenosis in the left tibioperoneal trunk with diminished arterial blood flow velocities in the infrapopliteal vessels.    VS: wnl  Gen: Well appearing adult male in NAD  Head: NC/AT  Neck: trachea midline  Resp:  No distress  CV: irreg irreg, no RMG  Abd: nondistended  Ext: no deformities  Vascular:  toes are cold on R foot, but remainder of foot is warm.   L foot toes are warm, foot is warm.   CR ~ 2 seconds bilaterally.   Pulses:  palpable L foot DP; unable to palate R pulse DP.  Neuro:  A&Ox4 appears non focal  Skin:  Warm and dry as visualized  Psych: appropriate    Medical Decision Making / Differential Diagnosis:  HPI warrants concern for 1) hypokalemia - repeat electrolytes.  2) subacute PAD in the RLE.  Patient is in NAD at this time.  No pain at rest.   Plan:  cbc, cmp, INR, VA duplex reassess    ECG directly visualized by me and shows atrial flutter, rate 75, NE - n/a, , QTc 446 Emergency Medicine Attending MD Grayson:  patient seen and evaluated with the resident.  I was present for key portions of the History & Physical, and I agree with the Impression & Plan.    Patient is a 69-year-old male brought in by family for evaluation of hyperkalemia identified on outpatient labs yesterday.  Patient was being evaluated by his PMD for atraumatic right lower extremity pain.  Patient has been having this pain on an intermittently, once per week, for the last 2 months.      Per HIE, patient has had chronic PAD in RLE for years.  VA Duplex BLE, performed 4/2026, showed:  -Stenosis/occlusion of the right popliteal artery with markedly diminished arterial blood flow velocities in the infrapopliteal vessels.  -High-grade stenosis in the left tibioperoneal trunk with diminished arterial blood flow velocities in the infrapopliteal vessels.    VS: wnl  Gen: Well appearing adult male in NAD  Head: NC/AT  Neck: trachea midline  Resp:  No distress  CV: irreg irreg, no RMG  Abd: nondistended  Ext: no deformities  Vascular:  toes are cold on R foot, but remainder of foot is warm.   L foot toes are warm, foot is warm.   CR ~ 2 seconds bilaterally.   Pulses:  palpable L foot DP; unable to palate R pulse DP.  Neuro:  A&Ox4 appears non focal  Skin:  Warm and dry as visualized  Psych: appropriate    Medical Decision Making / Differential Diagnosis:  HPI warrants concern for 1) hypokalemia - repeat electrolytes.  2) subacute PAD in the RLE.  Patient is in NAD at this time.  No pain at rest.   Plan:  cbc, cmp, INR, VA duplex reassess    ECG directly visualized by me and shows atrial flutter, rate 75, AK - n/a, , QTc 446    PMD = Bert Walton ([phone])  Primary Nephro = Markell Walton  Prior Hospitalist (Baudilio Mcdaniel)

## 2024-12-03 NOTE — H&P ADULT - ASSESSMENT
70 y/o male with hx of CAD s/p multiple  stents, HFrEF (EF~50% as of 11/26/23), A-fib, HTN, HLD, T2DM, CKD3, former smoke a/w BLe pain found to have hypokalemia and US : There is bilateral lower extremity arterial vascular disease, more severe   on the right.    The right popliteal artery is occluded.  The right posterior tibial peroneal trunk is occluded.  There is a postobstructive pattern of diminished antegrade flow through   the right trifurcation and dorsal pedis arteries.    There is a severe stenosis of the left posterior tibial artery in the   proximal calf.  There is a severe stenosis of the left peroneal artery in the proximal   calf.  pt denies any diarreah   no cp/sob  no chjnage in dosing of his diuretics     hypokalemia/magenmia : cont replacement per renal   monitor K and mag     BARRY : hold diuresis     Severe PAD : fu with DR. Calle: called     afib : hold DOAC   start heparin     CAD: fu with [facility]     DM: monitor FS

## 2024-12-03 NOTE — ED ADULT NURSE NOTE - OBJECTIVE STATEMENT
70 yo presents to the ED from home. A&Ox4, ambulatory c/o abnormal labs. pt found to have low K on blood work drawn yesterday, 2.4. takes potassium daily. Denies chest pain, palpations. Was seen at cards office, with complaints of 3 months of poor leg circulation, EKG done. pt placed on CM. 20G inserted L forearm. Patient undressed and placed into gown, call bell in hand and side rails up for safety. warm blanket provided, vital signs stable, pt in no acute distress.

## 2024-12-03 NOTE — CONSULT NOTE ADULT - SUBJECTIVE AND OBJECTIVE BOX
HPI: Mr. Velázquez is a 69 year-old man with history of multiple medical issues including hypertension, type 2 diabetes mellitus, coronary artery disease s/p 10 stents, atrial fibrillation, congestive heart failure with reduced EF, and chronic kidney disease. He presented today to the ER after being noted on outpatient labwork to be severely hypokalemic. His serum potassium in the ER was 2.6meq/L; therefore, in addition to being ordered for IV and PO KCL, a renal consultation was requested. He is well-known to me from both the inpatient and the outpatient setting.    PAST MEDICAL & SURGICAL HISTORY:  HTN  HLD  DM2  CAD - stent  HFpEF  AFib  CKD  Carotid stenosis - L CEA    Allergies  atorvastatin (Muscle Pain (Severe))    SOCIAL HISTORY:  (+)former smoker    FAMILY HISTORY:  Heart disease -father  age 71  Atrial fibrillation - sister    REVIEW OF SYSTEMS:  CONSTITUTIONAL: No weakness, fevers or chills  EYES/ENT: No visual changes;  No vertigo or throat pain   NECK: No pain or stiffness  RESPIRATORY: No cough, wheezing, hemoptysis; No shortness of breath  CARDIOVASCULAR: No chest pain or palpitations  GASTROINTESTINAL: No abdominal or epigastric pain. No nausea, vomiting, or hematemesis; No diarrhea or constipation. No melena or hematochezia.  GENITOURINARY: No dysuria, frequency or hematuria  NEUROLOGICAL: No numbness or weakness  SKIN: No itching, burning, rashes, or lesions   All other review of systems is negative unless indicated above.    VITAL:  T(C): , Max: 36.4 (24 @ 09:37)  T(F): , Max: 97.6 (24 @ 09:37)  HR: 85 (24 @ 09:37)  BP: 132/70 (24 @ 09:37)  BP(mean): --  RR: 18 (24 @ 09:37)  SpO2: 99% (24 @ 09:37)  Wt(kg): --    PHYSICAL EXAM:  Constitutional: NAD, Alert  HEENT: NCAT, MMM  Neck: Supple, No JVD  Respiratory: CTA-b/l  Cardiovascular: RRR s1s2, no m/r/g  Gastrointestinal: BS+, soft, NT/ND  Extremities: No peripheral edema b/l  Neurological: no focal deficits; strength grossly intact  Back: no CVAT b/l  Skin: No rashes, no nevi    LABS:                        15.9   12.17 )-----------( 322      ( 03 Dec 2024 10:30 )             45.6     Na(134)/K(2.6)/Cl(86)/HCO3(27)/BUN(97)/Cr(2.54)Glu(155)/Ca(9.8)/Mg(--)/PO4(--)    12-03 @ 10:30        IMAGING:  < from: Xray Chest 2 Views PA/Lat (24 @ 15:03) >  Clear lungs.    ASSESSMENT:  (1)CKD - stage 4 - nonproteinuric - due to hypertension/atherosclerotic disease +/- component from diabetic nephropathy  (2)BARRY - prerenally mediated fluctuations in the numbers based on volume status  (3)Hypokalemia - diuretic-associated    RECOMMEND:  (1)Can give KCL 40meq po x 2 doses total + KCL 10meq iv x 3 doses total this afternoon  (2)Mg repletion as ordered  (3)BMP+Mg q12h:      (a)KCl 40meq po x 2 + KCl 10meq iv x 3 prn K <3.0      (b)KCL 40meq po x 1 + KCL 10meq iv x 1 prn KCL 3-3.5  (4)Withhold diuretics for now- can likely reinstate tomorrow or Thursday  (5)Meds for GFR 20-30ml/min    Thank you for involving [facility] in this patient's care.    With warm regards,    Markell Walton MD   [facility]  Office: [phone]  Cell: [phone]               HPI: Mr. Velázquez is a 69 year-old man with history of multiple medical issues including hypertension, type 2 diabetes mellitus, coronary artery disease s/p 10 stents, atrial fibrillation, congestive heart failure with reduced EF, and chronic kidney disease. He presented today to the ER after being noted on outpatient labwork with Cardiology Dr. Moon to be severely hypokalemic. His serum potassium in the ER was 2.6meq/L; therefore, in addition to being ordered for IV and PO KCL, a renal consultation was requested. He is well-known to me from both the inpatient and the outpatient setting.    Mr. Velázquez generally feels well. He does admit to some right foot pain. He shows me that he has developed erythema of the dorsum of the right foot, and there is concern that the changes are due to peripheral arterial disease. He denies diarrhea of late. He has been adhering to his diuretics and his KCL supplements at home as ordered and the doses have not been adjusted of late.      PAST MEDICAL & SURGICAL HISTORY:  HTN  HLD  DM2  CAD - stent  HFpEF  AFib  CKD  Carotid stenosis - L CEA    Allergies  atorvastatin (Muscle Pain (Severe))    SOCIAL HISTORY:  (+)former smoker    FAMILY HISTORY:  Heart disease -father  age 71  Atrial fibrillation - sister    REVIEW OF SYSTEMS:  CONSTITUTIONAL: No weakness, fevers or chills  EYES/ENT: No visual changes;  No vertigo or throat pain   NECK: No pain or stiffness  RESPIRATORY: No cough, wheezing, hemoptysis; No shortness of breath  CARDIOVASCULAR: No chest pain or palpitations  GASTROINTESTINAL: No abdominal or epigastric pain. No nausea, vomiting, or hematemesis; No diarrhea or constipation. No melena or hematochezia.  GENITOURINARY: No dysuria, frequency or hematuria  NEUROLOGICAL: (+)right foot pain  SKIN: (+)right foot rash  All other review of systems is negative unless indicated above.    VITAL:  T(C): , Max: 36.4 (24 @ 09:37)  T(F): , Max: 97.6 (24 @ 09:37)  HR: 85 (24 @ 09:37)  BP: 132/70 (24 @ 09:37)  BP(mean): --  RR: 18 (24 @ 09:37)  SpO2: 99% (12-03-24 @ 09:37)  Wt(kg): --    PHYSICAL EXAM:  Constitutional: NAD, Alert  HEENT: NCAT, DMM  Neck: Supple, No JVD  Respiratory: CTA-b/l  Cardiovascular: RRR s1s2, no m/r/g  Gastrointestinal: BS+, soft, NT/ND  Extremities: No peripheral edema b/l  Neurological: no focal deficits; strength grossly intact  Back: no CVAT b/l  Skin: (+)erythema of dorsum of right foot    LABS:                        15.9   12.17 )-----------( 322      ( 03 Dec 2024 10:30 )             45.6     Na(134)/K(2.6)/Cl(86)/HCO3(27)/BUN(97)/Cr(2.54)Glu(155)/Ca(9.8)/Mg(--)/PO4(--)    12-03 @ 10:30        IMAGING:  < from: Xray Chest 2 Views PA/Lat (24 @ 15:03) >  Clear lungs.    ASSESSMENT:  (1)CKD - stage 4 - nonproteinuric - due to hypertension/atherosclerotic disease +/- component from diabetic nephropathy  (2)BARRY - prerenally mediated fluctuations in the numbers based on volume status  (3)Hypokalemia - diuretic-associated  (4)Vasc - ?PAD    RECOMMEND:  (1)Can give KCL 40meq po x 2 doses total + KCL 10meq iv x 3 doses total this afternoon  (2)Mg repletion as ordered  (3)BMP+Mg q12h:      (a)KCl 40meq po x 2 + KCl 10meq iv x 3 prn K <3.0      (b)KCL 40meq po x 1 + KCL 10meq iv x 1 prn KCL 3-3.5  (4)Withhold diuretics for now- can likely reinstate tomorrow or Thursday  (5)Noninvasive PAD workup as planned; if testing demonstrates significant PAD and if creatinine remains below 3, I would not object to angiography (with minimization of contrast as able)  (5)Meds for GFR 20-30ml/min    Thank you for involving [facility] in this patient's care.    With warm regards,    Markell Walton MD   [facility]  Office: [phone]  Cell: [phone]

## 2024-12-03 NOTE — ED ADULT NURSE REASSESSMENT NOTE - NS ED NURSE REASSESS COMMENT FT1
Report received fromKathy RN. Pt is observed sitting up in stretcher conversing with RN at this time. Pt breathing spontaneous and unlabored. Pt updated on plan of care and awaiting tele bed at this time, denies chest pain at this time. Safety and comfort measures maintained. Call bell within reach.

## 2024-12-03 NOTE — PATIENT PROFILE ADULT - FALL HARM RISK - UNIVERSAL INTERVENTIONS
Bed in lowest position, wheels locked, appropriate side rails in place/Call bell, personal items and telephone in reach/Instruct patient to call for assistance before getting out of bed or chair/Non-slip footwear when patient is out of bed/[place] to call system/Physically safe environment - no spills, clutter or unnecessary equipment/Purposeful Proactive Rounding/Room/bathroom lighting operational, light cord in reach

## 2024-12-03 NOTE — ED PROVIDER NOTE - WET READ LAUNCH FT
Render Risk Assessment In Note?: no
Detail Level: Zone
There are no Wet Read(s) to document.
Comment: Areas have improved and faint. Discussed expectations. Sun protection discussed.  Will transition to cyspera with retinoid.

## 2024-12-03 NOTE — PATIENT PROFILE ADULT - SAFE PLACE TO LIVE
lying in bed, remains hypotensive in ICU    Vital Signs Last 24 Hrs  T(C): 36.3 (2020 07:58), Max: 36.7 (10 Jul 2020 18:42)  T(F): 97.4 (2020 07:58), Max: 98 (10 Jul 2020 18:42)  HR: 102 (2020 10:00) (69 - 105)  BP: 84/64 (2020 10:00) (70/49 - 117/75)  BP(mean): 69 (2020 10:00) (54 - 85)  RR: 14 (2020 10:00) (11 - 17)  SpO2: 100% (2020 10:00) (93% - 100%)    PHYSICAL EXAM:    general - AAO x 3  HEENT - No Icterus  CVS - RRR  RS - AE B/L  Abd - soft, NT  Ext - Pulses +        LABS:                        9.2    7.86  )-----------( 51       ( 2020 04:33 )             29.2     07-11    139  |  109<H>  |  16  ----------------------------<  124<H>  3.8   |  23  |  0.84    Ca    7.2<L>      2020 04:33  Phos  3.0     07-11  Mg     2.4     07-11    TPro  7.8  /  Alb  1.4<L>  /  TBili  0.5  /  DBili  x   /  AST  15  /  ALT  8<L>  /  AlkPhos  51  07-10    PT/INR - ( 10 Jul 2020 07:43 )   PT: 20.1 sec;   INR: 1.87 ratio         PTT - ( 10 Jul 2020 07:43 )  PTT:36.0 sec  Urinalysis Basic - ( 10 Jul 2020 08:08 )    Color: Yellow / Appearance: very cloudy / S.005 / pH: x  Gluc: x / Ketone: Negative  / Bili: Negative / Urobili: Negative mg/dL   Blood: x / Protein: 500 mg/dL / Nitrite: Negative   Leuk Esterase: Moderate / RBC: 11-25 /HPF / WBC >50   Sq Epi: x / Non Sq Epi: Moderate / Bacteria: x        Culture - Urine (collected 10 Jul 2020 11:17)  Source: .Urine Clean Catch (Midstream)  Preliminary Report (2020 08:50):    >100,000 CFU/ml Gram Negative Rods    Culture - Blood (collected 10 Jul 2020 09:01)  Source: .Blood Blood-Peripheral  Preliminary Report (2020 10:00):    No growth to date.    Culture - Blood (collected 10 Jul 2020 09:01)  Source: .Blood Blood-Peripheral  Preliminary Report (2020 10:00):    No growth to date.        RADIOLOGY & ADDITIONAL STUDIES: no

## 2024-12-03 NOTE — CONSULT NOTE ADULT - SUBJECTIVE AND OBJECTIVE BOX
VASCULAR SURGERY CONSULT NOTE    HPI:  68 yo male former smoker with history of HTN, HLD, DM, CKD3, CAD s/p PCI, HFrEF, afib on Xarelto, remote L CEA, and known PAD admitted for hypokalemia on routine outpatient bloodwork. Patient seen and evaluated shortly after transfer to the floor, reports he is feeling well with no complaints. Notes the top of both of his feet has been somewhat redder than normal for about 3 weeks but has no pain in the foot, wounds, changes in strength or sensation, or pain with ambulation. Does admit to occasional cramping in the legs at night or when relaxing. Also denies CP, SOB, fevers, N/V, vision changes, slurred speech, numbness, weakness.        VITALS & I/Os:  Vital Signs Last 24 Hrs  T(C): 36.5 (03 Dec 2024 17:38), Max: 36.7 (03 Dec 2024 15:17)  T(F): 97.7 (03 Dec 2024 17:38), Max: 98 (03 Dec 2024 15:17)  HR: 86 (03 Dec 2024 17:38) (82 - 86)  BP: 158/88 (03 Dec 2024 17:38) (127/75 - 158/88)  BP(mean): 92 (03 Dec 2024 15:17) (92 - 92)  RR: 18 (03 Dec 2024 17:38) (18 - 18)  SpO2: 97% (03 Dec 2024 17:38) (97% - 100%)    Parameters below as of 03 Dec 2024 17:38  Patient On (Oxygen Delivery Method): room air        I&O's Summary      Physical Exam:  General: NAD, male appearing stated age  Neuro: Awake, alert and oriented  HEENT: NC, AT, MOM  Resp: Unlabored breathing, symmetric chest expansion  Vascular: 1+ R fem 2+ L fem, nonpalp R pop 1+ L pop, nonpalp R DP/PT, 2+ L DP  Rubrous discoloration and dry skin of the dorsum of both forefeet, no active wounds  Sensation and strength grossly intact, symmetric    Laboratory:                        15.9   12. )-----------( 322      (  @ 10:30 )             45.6                                  Phos: 4.9 M.9  134  |  86  |  97  ----------------------------<  155  2.6   |  27  |  2.54             TPro 7.6 / Alb 4.4 / TBili 1.5[H] / DBili x  / AST/AST 37/20 / AlkPhos 151[H]    PT/INR/PTT - ( @ 10:30) PT: 29.7 sec; INR: 2.61 ratio ; PTT: 46.8 sec          Urinalysis Basic - ( 03 Dec 2024 10:30 )    Color: x / Appearance: x / SG: x / pH: x  Gluc: 155 mg/dL / Ketone: x  / Bili: x / Urobili: x   Blood: x / Protein: x / Nitrite: x   Leuk Esterase: x / RBC: x / WBC x   Sq Epi: x / Non Sq Epi: x / Bacteria: x      RADIOLOGY:  < from: VA Duplex Lower Extrem Arterial, Bilat (24 @ 14:39) >  Findings:    The right toe-brachial index equals 0.32; the left ankle-brachial index   equals 0.8.    There is an irregular heart rate.    Atheromatous plaque of varying severity is present along the course of   the artery supplying both the right and left lower extremities.    The peak systolic velocities of the Right lower extremity are as follows:    Distal external iliac artery: 91 cm/s  Common femoral artery: 1:15 cm/s  Deep femoral artery: 81 cm/s  Superficial femoral artery: 90 cm/s proximal,  105 cm/s mid, 63 cm/s   distal  Popliteal artery: OCCLUDED  Tibioperoneal trunk: OCCLUDED  Posterior tibial artery: 50 cm/s proximal,  14 cm/s mid, 20 cm/s distal  Peroneal artery: 9 cm/s proximal,  8 cm/s mid, 10 cm/s distal  Anterior tibial artery: 18 cm/s proximal,  20 cm/s mid, 9 cm/s distal  Dorsalis pedis artery: 13 cm/s    The peak systolic velocities of the Left lower extremity are as follows:    Distal external iliac artery: 103 cm/s  Common femoral artery: 70 cm/s  Deep femoral artery: 54 cm/s  Superficial femoral artery: 63 cm/s proximal,  90 cm/s mid, 64 cm/s distal  Popliteal artery: 62 cm/s  Tibioperoneal trunk: 59 cm/s  Posterior tibial artery: 352 cm/s proximal,  46 cm/s mid, 38 cm/s distal  Peroneal artery: 311 cm/s proximal,  37 cm/s mid, 34 cm/s distal  Anterior tibial artery: 30 cm/s proximal,  13 cm/s mid, 36 cm/s distal  Dorsalis pedis artery: 16 cm/s    Impression:    There is bilateral lower extremity arterial vascular disease, more severe   on the right.    The right popliteal artery is occluded.  The right posterior tibial peroneal trunk is occluded.  There is a postobstructive pattern of diminished antegrade flow through   the right trifurcation and dorsal pedis arteries.    There is a severe stenosis of the left posterior tibial artery in the   proximal calf.  There is a severe stenosis of the left peroneal artery in the proximal   calf.    --- End of Report ---    < end of copied text >

## 2024-12-03 NOTE — CONSULT NOTE ADULT - SUBJECTIVE AND OBJECTIVE BOX
HPI: 69 year old man well known to our group, best to Dr Moon, last seen in office yesterday  in stable cardiac condition, blood work drawn. + h/o ashd s/p multiple pci, last pci dLAD 24 by Dr ILDA Dominguez. +HTN, +DM (Dr Reyes), +ckd (Dr Christophe Walton), s/p cea by Dr Calle, known hfref ef last 47%. +AF on xarelto. Lab called this am that his K+ was low 2.4, his bun much higher at 103, and cr higher at 2.5. He was advised to come to er for evaluation and treatment. No chest pain or dyspnea. Blood work available thus far  in ER bun/cr 97/2.54, K+ 2.6. Wife at bedside.      PAST MEDICAL & SURGICAL HISTORY:  Former smoker      CAD in native artery      Type 2 diabetes mellitus      Hyperlipidemia, unspecified hyperlipidemia type      Essential hypertension, hypertension with unspecified goal      Afib      Hematoma of leg      Stented coronary artery      CHF (congestive heart failure)      s/p Carotid Endarterectomy  left          Allergies    atorvastatin (Muscle Pain (Severe))      FAMILY HISTORY:  Family history of heart disease  father  age 71    FH: atrial fibrillation  sister        MEDICATIONS: insulin, xarelto 15 , leqvio, plavix 75, kcl 20, metoprolol succ 50 bid, lasix 40 qd, metolazone 5 qod for weight over 120 lbs      REVIEW OF SYSTEMS:    CONSTITUTIONAL: No weakness, fevers or chills  EYES/ENT: No visual changes;  No vertigo or throat pain   NECK: No pain or stiffness  RESPIRATORY: No cough, wheezing, hemoptysis; No shortness of breath  CARDIOVASCULAR: No chest pain or palpitations  GASTROINTESTINAL: No abdominal or epigastric pain. No nausea, vomiting, or hematemesis; No diarrhea or constipation. No melena or hematochezia.  GENITOURINARY: No dysuria, frequency or hematuria  NEUROLOGICAL: No numbness or weakness  SKIN: No itching, burning, rashes, or lesions   All other review of systems is negative unless indicated above    Vital Signs Last 24 Hrs  T(C): 36.4 (03 Dec 2024 09:37), Max: 36.4 (03 Dec 2024 09:37)  T(F): 97.6 (03 Dec 2024 09:37), Max: 97.6 (03 Dec 2024 09:37)  HR: 85 (03 Dec 2024 09:37) (85 - 85)  BP: 132/70 (03 Dec 2024 09:37) (132/70 - 132/70)  BP(mean): --  RR: 18 (03 Dec 2024 09:37) (18 - 18)  SpO2: 99% (03 Dec 2024 09:37) (99% - 99%)    Parameters below as of 03 Dec 2024 09:37  Patient On (Oxygen Delivery Method): room air        I&O's Summary      PHYSICAL EXAM:    Constitutional: NAD, awake and alert, well-developed  HEENT: PERR, EOMI  Neck: soft and supple, No LAD, No JVD  Respiratory: Breath sounds are clear bilaterally, No wheezing, rales or rhonchi  Cardiovascular: Regular rate and rhythm, normal S1 and S2,  no murmurs, gallops or rubs  Extremities: No peripheral edema. No clubbing or cyanosis.  Neurological: A/O x 3, no focal deficits          LABS: All Labs Reviewed:                        15.9   12.17 )-----------( 322      ( 03 Dec 2024 10:30 )             45.6     03 Dec 2024 10:30    134    |  86     |  97     ----------------------------<  155    2.6     |  27     |  2.54     Ca    9.8        03 Dec 2024 10:30    TPro  7.6    /  Alb  4.4    /  TBili  1.5    /  DBili  x      /  AST  37     /  ALT  20     /  AlkPhos  151    03 Dec 2024 10:30    PT/INR - ( 03 Dec 2024 10:30 )   PT: 29.7 sec;   INR: 2.61 ratio         PTT - ( 03 Dec 2024 10:30 )  PTT:46.8 sec      EKG: unable to locate

## 2024-12-03 NOTE — CONSULT NOTE ADULT - ASSESSMENT
68 yo male former smoker with history of HTN, HLD, DM, CKD3, CAD s/p PCI, HFrEF, afib on Xarelto, remote L CEA, and known PAD admitted for hypokalemia and BARRY on routine outpatient bloodwork. Patient notes about 3 weeks of redness on both feet but denies wounds, rest pain, or claudication in either leg. On exam he has nonpalpable distal pulses on the right but palpable DP on the left. Duplex imaging ordered in the ED notable for occlusion of the right popliteal artery and TPT but reconstitution of the tibial arteries, a progression from last US in April at which time the popliteal was occluded distally but TPT was patent. R TBI diminished, however no signs or symptoms concerning for acute limb ischemia. In addition to hypokalemia and BARRY, leukocytosis to 12; red discoloration of feet could represent superficial infection however no other obvious infectious s/s.    Recs:  - No urgent vascular surgical indication  - Please obtain NIKHIL/PVRs with toe pressures for further evaluation of PAD  - Remainder of care per primary  - Will follow    Discussed with fellow on behalf of attending    Vascular Surgery  [id number]  70 yo male former smoker with history of HTN, HLD, DM, CKD3, CAD s/p PCI, HFrEF, afib on Xarelto, remote L CEA, and known PAD admitted for hypokalemia and BARRY on routine outpatient bloodwork. Patient notes about 3 weeks of redness on both feet but denies wounds, rest pain, or claudication in either leg. On exam he has nonpalpable distal pulses on the right but palpable DP on the left. Duplex imaging ordered in the ED notable for occlusion of the right popliteal artery and TPT but reconstitution of the tibial arteries, a progression from last US in April at which time the popliteal was occluded distally but TPT was patent. R TBI diminished, however no signs or symptoms concerning for acute limb ischemia. In addition to hypokalemia and BARRY, leukocytosis to 12; red discoloration of feet could represent superficial infection however no other obvious infectious s/s.    Recs:  - No urgent vascular surgical indication  - Please obtain NIKHIL/PVRs with toe pressures for further evaluation of PAD  - Remainder of care per primary  - Will follow    Discussed with Dr. Irwin on behalf of Dr. Calle    Vascular Surgery  [id number]

## 2024-12-03 NOTE — CONSULT NOTE ADULT - ASSESSMENT
#h/o ashd s/p multiple pci, last pci dLAD 4/17/24 by Dr ILDA Dominguez  #HTN  #DM (Dr Reyes)  #ckd (Dr Christophe Walton)  #s/p cea by Dr Calle  # hfref ef last 47%  #AF on xarelto  #Hypokalemia  #jesika on ckd: Lab called this am that his K+ was low 2.4, his bun much higher at 103, and cr higher at 2.5. He was advised to come to er for evaluation and treatment. No chest pain or dyspnea. Blood work available thus far  in ER bun/cr 97/2.54, K+ 2.6. Wife at bedside.  Likely dehydrated due to combination Zaroxolyn and furosemide.  Please obtain ekg and echo.  GENTLE hydration, replacement K+.  Renal consult Dr Christophe Walton.  Stable at present cv persective.

## 2024-12-03 NOTE — ED ADULT NURSE NOTE - NSFALLRISKFACTORS_ED_ALL_ED
Electrophysiology Consultation Note   EP Attending:   Reason for consultation:  AV dissociation, accelerated junctional rhythm, s/p aortic root homograft  Provider requesting consultation: Dr. Bowers  Date of Service: 12/20/2017      HPI: Cricket Miguel is a 66 year old male with hx of COPD, HTN, HLD, CAD, HFpEF, aortic stenosis and ascending aortic aneurism s/p aortic valve replacement and aortic root replacement (April 2016) c/b moises-aortic abscess, endocarditis, cerebral septic emboli and severe AI s/p redo-sternotomy aortic root and valve homograft 12/19.     EP was consulted for AV dissociation and accelerated junctional rhythm.   At this point, patient is currently intubated and continues on epinephrine and norepinephrine.    Reviewing the telemetry patient's HR is in the 80-90s.      Current medications include atorvastatin, precedex, epi ratine from 0.1 - 0.07 mcg/kg/min, fentanyl, insulin gtt, lasix 20 mg, norepinephrine off this am  Past Medical History:   Past Medical History:   Diagnosis Date     Abscess of aortic root 09/2017     AI (aortic insufficiency)     severe     Anxiety      Aortic root dilatation (H)      AR (aortic regurgitation)     severe     Cardiomyopathy (H)      CHF (congestive heart failure), NYHA class II (H)      COPD, mild (H)     spirometry 12/16     CVA (cerebral vascular accident) (H) 09/2017    septic emboli - MSSA - cerebellum, Left MCA, Rt Occipital, Aphasia, Left visual field cut, moderate to severe encephalopathy     Depression 09/2017     Heart murmur      Hyperlipidemia LDL goal <70 10/31/2010     Hypertension goal BP (blood pressure) < 140/90      Inguinal hernia      left lung nodule  1/29/2008     Major depressive disorder, single episode, moderate (H)      Psoriasis childhood     Tobacco use disorder      Past Surgical History:   Past Surgical History:   Procedure Laterality Date     ARTHROSCOPY KNEE INCISION AND DRAINAGE Left 10/8/2017    Procedure:  ARTHROSCOPY KNEE INCISION AND DRAINAGE;  Arthroscopic Incision and Drainage of Left Knee;  Surgeon: Lucretia Munoz MD;  Location: [facility] OR     HC SHOULDER ARTHROSCOPY, DX  2001    Arthroscopy, Shoulder RT Dr OSIRIS Andersen     HC SHOULDER ARTHROSCOPY, DX  1/04    LT arthroscopy Dr OSIRIS Andersen     HERNIA REPAIR, UMBILICAL  1/08    incarcerated - dr rockwell     HERNIORRHAPHY INGUINAL  12/21/2012    HERNIORRHAPHY INGUINAL;  Right Inguinal Hernia Repair with mesh ;  Surgeon: Mello Rockwell MD;  Location: [facility] OR     REPLACE VALVE AORTIC N/A 5/17/2016    REPLACE VALVE AORTIC - Dr Bowers     ROTATOR CUFF REPAIR RT/LT  11/10    Rt arthroscopy - Dr OSIRIS Andersen     SURGICAL HISTORY OF -   5/16    AVR, severe AR, aortic root repair     TRACHEOSTOMY PERCUTANEOUS  10/27/2017          Allergies: Per MAR     Allergies   Allergen Reactions     Lisinopril Swelling     One-sided facial swelling after being on lisinopril/HCTZ for one week.      Medications:   Per MAR current outpatient cardiovascular medications include: Prescriptions Prior to Admission   Medication Sig Dispense Refill Last Dose     ARIPiprazole (ABILIFY) 2 MG tablet Take 3 tablets (6 mg) by mouth daily 30 tablet 0 12/16/2017 at 8 a.m     hydrochlorothiazide (HYDRODIURIL) 25 MG tablet Take 0.5 tablets (12.5 mg) by mouth daily 90 tablet 1 12/16/2017 at 12 a.m     labetalol (NORMODYNE) 100 MG tablet Take 1 tablet (100 mg) by mouth every 8 hours   12/16/2017 at 3 p.m     D5W 1,000 mL with nafcillin 12 g continuous infusion Inject 12 g into the vein every 24 hours   12/16/2017 at 8a.m     famotidine (PEPCID) 40 MG/5ML suspension Take 2.5 mLs (20 mg) by mouth 2 times daily 75 mL  12/17/2017 at Unknown time     acetaminophen (TYLENOL) 32 mg/mL solution 20.3 mLs (650 mg) by Oral or Feeding Tube route every 6 hours as needed for fever or mild pain 400 mL 0 12/16/2017 at 4 p.m     aspirin 81 MG chewable tablet 1 tablet (81 mg) by Oral or Feeding Tube route daily 36 tablet   12/16/2017 at 4 p.m     fiber modular (NUTRISOURCE FIBER) packet 1 packet by Per Feeding Tube route 3 times daily   12/16/2017 at 8 p.m     mirtazapine (REMERON) 7.5 MG TABS tablet GIVE 3 TABLETS(22.5MG) VIA G-TUBE AT BEDTIME 90 tablet 0 12/16/2017 at 8 p.m     loperamide (IMODIUM A-D) 2 MG tablet 2 tab by g tube 3 times daily PRN for diarrhea 30 tablet 0      potassium chloride (KLOR-CON) 20 MEQ Packet    12/16/2017 at 4 p.m     QUEtiapine (SEROQUEL) 25 MG tablet 1 tablet (25 mg) by Oral or Feeding Tube route 4 times daily as needed 60 tablet  12/16/2017 at 8 p.m     venlafaxine (EFFEXOR) 100 MG tablet Take 1 tablet (100 mg) by mouth 2 times daily 60 tablet 3 12/16/2017 at 4p.m     ondansetron (ZOFRAN) 4 MG tablet Take 1-2 tablets (4-8mg) every 8 hours as needed for nausea 20 tablet 0      amLODIPine (NORVASC) 5 MG tablet Take 1 tablet (5 mg) by mouth daily 180 tablet 3 12/16/2017 at 8 a.m     lactobacillus TABS Take 1 tablet by mouth 3 times daily        Menthol-Zinc Oxide 0.44-20.6 % OINT 1 application topically TID PRN for perianal irritation        Miconazole Nitrate 2 % POWD Apply in folds        moxifloxacin (VIGAMOX) 0.5 % ophthalmic solution Place 1 drop Into the left eye 3 times daily 1.25 mL 0      ketotifen (ZADITOR/REFRESH ANTI-ITCH) 0.025 % SOLN ophthalmic solution Place 1 drop Into the left eye 3 times daily 1 Bottle       White Petrolatum-Mineral Oil (SYSTANE NIGHTTIME) OINT         atorvastatin (LIPITOR) 40 MG tablet TAKE 1 TABLET(40 MG) BY MOUTH DAILY 90 tablet 3 12/16/2017 at 8 a.m     rifampin (REIFADEN) 25 mg/mL SUSP 12 mLs (300 mg) by Oral or Feeding Tube route 2 times daily   12/16/2017 at 8 p.m     polyethylene glycol (MIRALAX/GLYCOLAX) Packet Take 17 g by mouth daily as needed for constipation 7 packet       multivitamins with minerals (CERTAVITE/CEROVITE) LIQD liquid 15 mLs by Per Feeding Tube route daily   12/16/2017 at 12 a.m     No current outpatient prescriptions on file.     Current  Facility-Administered Medications   Medication Dose Route Frequency     high cardioplegia soln ([facility] formula)   PERFUSION Once     hot shot cardioplegia ([facility] formula)   PERFUSION Once     low cardioplegia ([facility] formula)   PERFUSION Once     pump prime ([facility] formula) solution   PERFUSION Once     heparin in saline (CELL SAVER) formula   PERFUSION Once     sodium chloride (PF)  3 mL Intracatheter Q8H     insulin aspart   Subcutaneous TID w/meals     acetaminophen  975 mg Oral Q8H     senna-docusate  1-2 tablet Oral BID     albuterol  2.5 mg Nebulization Q4H     mupirocin  0.5 g Both Nostrils BID     famotidine  20 mg Intravenous Q12H     acetylcysteine  2 mL Nebulization Q4H     albumin human         mirtazapine  30 mg Oral At Bedtime     rifampin  300 mg Oral or Feeding Tube BID     potassium chloride  40 mEq Oral Once     potassium chloride  40 mEq Oral Once     ketotifen  1 drop Left Eye TID     artificial tears   Ophthalmic At Bedtime     aspirin  81 mg Oral or Feeding Tube Daily     atorvastatin  40 mg Oral Daily     nafcillin continuous infusion  12 g/day Intravenous Q24H     Family History:   Family History   Problem Relation Age of Onset     Genetic Disorder Mother      Bone plateover growth Agustin. shoulder surgeries     CANCER Mother      brain cancer     Alzheimer Disease Father      Social History:   Social History   Substance Use Topics     Smoking status: Former Smoker     Packs/day: 1.00     Years: 35.00     Quit date: 4/1/2016     Smokeless tobacco: Never Used      Comment: 4/2016     Alcohol use 0.0 oz/week     0 Standard drinks or equivalent per week      Comment: 1 drink per week avg, seldom       ROS:   A comprehensive 10 point ROS was negative other than as mentioned in HPI.    Physical Examination:   VITALS: BP (!) 82/58  Temp 100.6  F (38.1  C) (Pulmonary Artery)  Resp 20  Wt 84.6 kg (186 lb 8.2 oz)  SpO2 100%  BMI 28.36 kg/m2     GENERAL APPEARANCE: intubated   HEENT: intubated and  sedated  NECK: Supple. No JVD or bruit. Good carotid upstroke.   CHEST: diminished in bases  CARDIOVASCULAR: S1S2, Reg, No m/r/g.   ABDOMEN: BS+, soft, No pulsatile masses or bruits.   EXTREMITIES: No pedal edema. Distal pulses intact.   NEURO: intubated  PSYCH: intubated   SKIN: Warm and dry.   Data:   Labs:  BMP  Recent Labs  Lab 12/20/17  0748 12/20/17  0412 12/20/17  0002 12/19/17  1806 12/19/17  1650  12/19/17  0606   NA  --  144 144 145* 143  < > 138   POTASSIUM 4.3 4.2 3.8 3.4 3.1*  < > 3.3*   CHLORIDE  --  109 109 109  --   --  100   IZABELLA  --  8.2* 7.8* 8.0*  --   --  8.6   CO2  --  25 26 29  --   --  29   BUN  --  13 11 10  --   --  9   CR  --  1.33* 1.22 1.16  --   --  1.38*   GLC  --  160* 179* 142* 112*  < > 112*   < > = values in this interval not displayed.  CBC  Recent Labs  Lab 12/20/17  0748 12/20/17  0412 12/20/17  0002 12/19/17 1806 12/19/17  1653  12/19/17  0606   WBC  --  15.5* 16.3* 16.1*  --   --  11.2*   RBC  --  2.62* 2.28* 3.04*  --   --  3.39*   HGB 8.1* 8.0* 7.0* 9.4* 9.6*  < > 10.7*   HCT  --  24.0* 21.4* 29.0*  --   --  34.0*   MCV  --  92 94 95  --   --  100   MCH  --  30.5 30.7 30.9  --   --  31.6   MCHC  --  33.3 32.7 32.4  --   --  31.5   RDW  --  18.0* 17.9* 17.4*  --   --  16.6*   PLT  --  358 338 303 283  < > 438   < > = values in this interval not displayed.  INR  Recent Labs  Lab 12/19/17 1806 12/19/17  1653 12/19/17  1515 12/19/17  0606   INR 0.90 0.81* 1.56* 1.27*     No results found for: CKTOTAL, CKMB, TROPN  Cholesterol (mg/dL)   Date Value   04/03/2016 105 (L)   02/07/2012 198     Cholesterol/HDL Ratio (no units)   Date Value   02/07/2012 4.2     HDL Cholesterol (mg/dL)   Date Value   04/03/2016 25   02/07/2012 47     LDL Cholesterol Calculated (mg/dL)   Date Value   04/03/2016 56   02/07/2012 123     EKG:     Tele:       ECHO:   Recent Results (from the past 4320 hour(s))   ECHO LIMITED WITH OPTISON    Narrative    [id number]  [id number]  [id number]  [id number]^SILVIA^GABRIEL^VELASQUEZ            [facility],[place]  Echocardiography Laboratory  [address] [place] [address]     Name: ASHTYN STROUD  MRN: [id number]  : 1951  Study Date: 2017 08:55 AM  Age: 66 yrs  Gender: Male  Patient Location: [id number]  Reason For Study: Aortic Valve Replacement, Concern for acute AI  Ordering Physician: GABRIEL VEGA  Referring Physician: SELF, REFERRED  Performed By: BERLIN Charlton     BSA: 2.0 m2  Height: 68 in  Weight: 182 lb  BP: 123/94 mmHg  _____________________________________________________________________________  __        Procedure  Limited Portable Echo Adult. Contrast Optison. Optison (NDC #8573-7357-19)  given intravenously. Patient was given 6 ml mixture of 3 ml Optison and 6 ml  saline. 3 ml wasted. IV start location L Forearm .  _____________________________________________________________________________  __        Interpretation Summary  The patient is post bio-Bentall - aortic root replacement with a 30 mm graft  and AVR with a Trifecta valve. Today, there is circumferential free space  noted surrounding the aortic graft, dehiscence and rocking of the graft from  the native aorta and severe AI in the space surrounding the aortic graft  (perivalvular/perigraft). The circumferential free space was present on the  previous studies of 2017 but was filled with echodense material and  the graft was not independently mobile, and there was no significant AI. The  prosthetic aortic valve leaflets are not well visualized today.  Findings were discussed with Dr. Tacho Muñoz.  _____________________________________________________________________________  __        Left Ventricle  Left ventricular wall thickness is normal. Mild left ventricular dilation is  present. The Ejection Fraction is estimated at 30-35%. Diastolic function not  assessed due to tachycardia. Severe diffuse hypokinesis is present.     Right Ventricle  The right ventricle is  normal size. Global right ventricular function is  mildly reduced.     Atria  Both atria appear normal.     Mitral Valve  Trace mitral insufficiency is present.        Aortic Valve  See below.     Tricuspid Valve  The tricuspid valve is normal.     Vessels  The patient is post bio-Bentall - aortic root replacement with a 30 mm graft  and AVR with a Trifecta valve. Today, there is circumferential free space  noted surrounding the aortic graft, dehiscence and rocking of the graft from  the native aorta and severe AI in the space surrounding the aortic graft  (perivalvular/perigraft). The circumferential free space was present on the  previous studies of October 2017 but was filled with echodense material and  the graft was not independently mobile, and there was no significant AI. The  prosthetic aortic valve leaflets are not well visualized today.     Pericardium  No pericardial effusion is present.     Compared to Previous Study  This study was compared with the study from 10/06/2017 . Since the prior  study, the LV function has worsened and the aortic graft appears to be  dehisced, as described above.     _____________________________________________________________________________  __  MMode/2D Measurements & Calculations  asc Aorta Diam: 3.3 cm     EF(MOD-bp): 35.3 %           Doppler Measurements & Calculations  AI P1/2t: 122.3 msec     _____________________________________________________________________________  __           Report approved by: Crista Colorado 12/17/2017 10:21 AM          Assessment:   Cricket Miguel is a 66 year old male with hx of COPD, HTN, HLD, CAD, HFpEF, aortic stenosis and ascending aortic aneurism s/p aortic valve replacement and aortic root replacement (April 2016) c/b moises-aortic abscess, endocarditis, cerebral septic emboli and severe AI s/p redo-sternotomy aortic root and valve homograft 12/19.     EP was consulted for AV dissociation and accelerated junctional rhythm.   At  this point, patient is currently intubated and continues on epinephrine and norepinephrine.    Reviewing the telemetry patient's HR is in the 80-90s.      EP Recommendations:  AV dissociation  - at this point there is no recommendation for intervention.  There is AV dissociation due to the junctional rhythm is at a higher rate than the SA node.  Recommend watchful waiting.  Pacing would not be recommended at this point as it would have to be set at 100 bpm and would be vpaced causing a wide complex QRS.  This patient now has narrow QRS which is favored over pacing with a wide complex QRS.        The patient states understanding and is agreeable with plan.   Thank you for allowing us to participate in the care of this patient.     The patient was discussed w/ Dr. Vernon.  The above note reflects our joint plan.    BIBI White CNP  Electrophysiology Consult Service  Pager: [phone]      ELECTROPHYSIOLOGY STAFF  Patient seen and examined by me.  History and physical examination discussed with Lona King whose note reflects our joint assessment and recommendation/plans.  Asked to see this 66 year old gentleman s/p aortic valve and aortic root placement yesterday.  ECG computer reports AV dissociation.  ECG and monitor strips reviewed by me.  He has sinus rhythm and accelerated junctional rhythm.  He has AV dissociation because the junctional rhythm is faster than the sinus rate.  There is no AV block.  When the R-P interval is sufficiently long P-waves can be seen to conduct.  There is no indication for pacing.  The junctional rhythm will likely resolve as intrinsic and extrinsic catecholamines diminish.  I discussed the situation with patient's wife to reassure her.  Duy Rojas       No indicators present

## 2024-12-03 NOTE — H&P ADULT - HISTORY OF PRESENT ILLNESS
70 y/o male with hx of CAD s/p multiple  stents, HFrEF (EF~50% as of 11/26/23), A-fib, HTN, HLD, T2DM, CKD3, former smoke a/w hypokalemia .   68 y/o male with hx of CAD s/p multiple  stents, HFrEF (EF~50% as of 11/26/23), A-fib, HTN, HLD, T2DM, CKD3, former smoke a/w BLe pain found to have hypokalemia and US : There is bilateral lower extremity arterial vascular disease, more severe   on the right.    The right popliteal artery is occluded.  The right posterior tibial peroneal trunk is occluded.  There is a postobstructive pattern of diminished antegrade flow through   the right trifurcation and dorsal pedis arteries.    There is a severe stenosis of the left posterior tibial artery in the   proximal calf.  There is a severe stenosis of the left peroneal artery in the proximal   calf.  pt denies any diarreah   no cp/sob  no chjnage in dosing of his diuretics

## 2024-12-03 NOTE — ED PROVIDER NOTE - CLINICAL SUMMARY MEDICAL DECISION MAKING FREE TEXT BOX
Medical Decision Making / Differential Diagnosis:  HPI warrants concern for 1) hypokalemia - repeat electrolytes.  2) subacute PAD in the RLE.  Patient is in NAD at this time.  No pain at rest.   Plan:  cbc, cmp, INR, VA duplex reassess    ECG directly visualized by me and shows atrial flutter, rate 75, CO - n/a, , QTc 446 Medical Decision Making / Differential Diagnosis:  HPI warrants concern for 1) hypokalemia - repeat electrolytes.  2) subacute PAD in the RLE.  Patient is in NAD at this time.  No pain at rest.   Plan:  cbc, cmp, INR, VA duplex reassess    ECG directly visualized by me and shows atrial flutter, rate 75, NH - n/a, , QTc 446    PMD = Bert Walton ([phone])  Primary Nephro = Markell Walton  Prior Hospitalist (Baudilio Mcdaniel)

## 2024-12-04 LAB
A1C WITH ESTIMATED AVERAGE GLUCOSE RESULT: 6.4 % — HIGH (ref 4–5.6)
ALBUMIN SERPL ELPH-MCNC: 4.4 G/DL — SIGNIFICANT CHANGE UP (ref 3.3–5)
ALP SERPL-CCNC: 124 U/L — HIGH (ref 40–120)
ALT FLD-CCNC: 16 U/L — SIGNIFICANT CHANGE UP (ref 10–45)
ANION GAP SERPL CALC-SCNC: 16 MMOL/L — SIGNIFICANT CHANGE UP (ref 5–17)
APTT BLD: 191.4 SEC — CRITICAL HIGH (ref 24.5–35.6)
APTT BLD: 78.1 SEC — HIGH (ref 24.5–35.6)
APTT BLD: >200 SEC — CRITICAL HIGH (ref 24.5–35.6)
AST SERPL-CCNC: 32 U/L — SIGNIFICANT CHANGE UP (ref 10–40)
BASOPHILS # BLD AUTO: 0.04 K/UL — SIGNIFICANT CHANGE UP (ref 0–0.2)
BASOPHILS NFR BLD AUTO: 0.4 % — SIGNIFICANT CHANGE UP (ref 0–2)
BILIRUB SERPL-MCNC: 1.6 MG/DL — HIGH (ref 0.2–1.2)
BUN SERPL-MCNC: 84 MG/DL — HIGH (ref 7–23)
CALCIUM SERPL-MCNC: 10.4 MG/DL — SIGNIFICANT CHANGE UP (ref 8.4–10.5)
CHLORIDE SERPL-SCNC: 92 MMOL/L — LOW (ref 96–108)
CO2 SERPL-SCNC: 33 MMOL/L — HIGH (ref 22–31)
CREAT SERPL-MCNC: 2.38 MG/DL — HIGH (ref 0.5–1.3)
EGFR: 29 ML/MIN/1.73M2 — LOW
EOSINOPHIL # BLD AUTO: 0.1 K/UL — SIGNIFICANT CHANGE UP (ref 0–0.5)
EOSINOPHIL NFR BLD AUTO: 1 % — SIGNIFICANT CHANGE UP (ref 0–6)
ESTIMATED AVERAGE GLUCOSE: 137 MG/DL — HIGH (ref 68–114)
GLUCOSE BLDC GLUCOMTR-MCNC: 216 MG/DL — HIGH (ref 70–99)
GLUCOSE BLDC GLUCOMTR-MCNC: 230 MG/DL — HIGH (ref 70–99)
GLUCOSE BLDC GLUCOMTR-MCNC: 351 MG/DL — HIGH (ref 70–99)
GLUCOSE BLDC GLUCOMTR-MCNC: 384 MG/DL — HIGH (ref 70–99)
GLUCOSE BLDC GLUCOMTR-MCNC: 64 MG/DL — LOW (ref 70–99)
GLUCOSE SERPL-MCNC: 39 MG/DL — CRITICAL LOW (ref 70–99)
HCT VFR BLD CALC: 42.2 % — SIGNIFICANT CHANGE UP (ref 39–50)
HCT VFR BLD CALC: 45.2 % — SIGNIFICANT CHANGE UP (ref 39–50)
HGB BLD-MCNC: 15 G/DL — SIGNIFICANT CHANGE UP (ref 13–17)
HGB BLD-MCNC: 15.7 G/DL — SIGNIFICANT CHANGE UP (ref 13–17)
IMM GRANULOCYTES NFR BLD AUTO: 0.6 % — SIGNIFICANT CHANGE UP (ref 0–0.9)
INR BLD: 1.78 RATIO — HIGH (ref 0.85–1.16)
LYMPHOCYTES # BLD AUTO: 1.53 K/UL — SIGNIFICANT CHANGE UP (ref 1–3.3)
LYMPHOCYTES # BLD AUTO: 14.9 % — SIGNIFICANT CHANGE UP (ref 13–44)
MAGNESIUM SERPL-MCNC: 3.7 MG/DL — HIGH (ref 1.6–2.6)
MCHC RBC-ENTMCNC: 31.5 PG — SIGNIFICANT CHANGE UP (ref 27–34)
MCHC RBC-ENTMCNC: 32.7 PG — SIGNIFICANT CHANGE UP (ref 27–34)
MCHC RBC-ENTMCNC: 34.7 G/DL — SIGNIFICANT CHANGE UP (ref 32–36)
MCHC RBC-ENTMCNC: 35.5 G/DL — SIGNIFICANT CHANGE UP (ref 32–36)
MCV RBC AUTO: 90.6 FL — SIGNIFICANT CHANGE UP (ref 80–100)
MCV RBC AUTO: 91.9 FL — SIGNIFICANT CHANGE UP (ref 80–100)
MONOCYTES # BLD AUTO: 1.05 K/UL — HIGH (ref 0–0.9)
MONOCYTES NFR BLD AUTO: 10.2 % — SIGNIFICANT CHANGE UP (ref 2–14)
NEUTROPHILS # BLD AUTO: 7.47 K/UL — HIGH (ref 1.8–7.4)
NEUTROPHILS NFR BLD AUTO: 72.9 % — SIGNIFICANT CHANGE UP (ref 43–77)
NRBC # BLD: 0 /100 WBCS — SIGNIFICANT CHANGE UP (ref 0–0)
NRBC # BLD: 0 /100 WBCS — SIGNIFICANT CHANGE UP (ref 0–0)
PHOSPHATE SERPL-MCNC: 3.4 MG/DL — SIGNIFICANT CHANGE UP (ref 2.5–4.5)
PLATELET # BLD AUTO: 287 K/UL — SIGNIFICANT CHANGE UP (ref 150–400)
PLATELET # BLD AUTO: 304 K/UL — SIGNIFICANT CHANGE UP (ref 150–400)
POTASSIUM SERPL-MCNC: 2.9 MMOL/L — CRITICAL LOW (ref 3.5–5.3)
POTASSIUM SERPL-SCNC: 2.9 MMOL/L — CRITICAL LOW (ref 3.5–5.3)
PROT SERPL-MCNC: 7.8 G/DL — SIGNIFICANT CHANGE UP (ref 6–8.3)
PROTHROM AB SERPL-ACNC: 20.4 SEC — HIGH (ref 9.9–13.4)
RBC # BLD: 4.59 M/UL — SIGNIFICANT CHANGE UP (ref 4.2–5.8)
RBC # BLD: 4.99 M/UL — SIGNIFICANT CHANGE UP (ref 4.2–5.8)
RBC # FLD: 13.9 % — SIGNIFICANT CHANGE UP (ref 10.3–14.5)
RBC # FLD: 13.9 % — SIGNIFICANT CHANGE UP (ref 10.3–14.5)
SODIUM SERPL-SCNC: 141 MMOL/L — SIGNIFICANT CHANGE UP (ref 135–145)
WBC # BLD: 10.25 K/UL — SIGNIFICANT CHANGE UP (ref 3.8–10.5)
WBC # BLD: 10.36 K/UL — SIGNIFICANT CHANGE UP (ref 3.8–10.5)
WBC # FLD AUTO: 10.25 K/UL — SIGNIFICANT CHANGE UP (ref 3.8–10.5)
WBC # FLD AUTO: 10.36 K/UL — SIGNIFICANT CHANGE UP (ref 3.8–10.5)

## 2024-12-04 PROCEDURE — 93923 UPR/LXTR ART STDY 3+ LVLS: CPT | Mod: 26

## 2024-12-04 RX ORDER — POTASSIUM CHLORIDE 600 MG/1
10 TABLET, EXTENDED RELEASE ORAL
Refills: 0 | Status: COMPLETED | OUTPATIENT
Start: 2024-12-04 | End: 2024-12-04

## 2024-12-04 RX ORDER — SPIRONOLACTONE 25 MG
50 TABLET ORAL DAILY
Refills: 0 | Status: DISCONTINUED | OUTPATIENT
Start: 2024-12-04 | End: 2024-12-06

## 2024-12-04 RX ORDER — INSULIN GLARGINE 100 [IU]/ML
15 INJECTION, SOLUTION SUBCUTANEOUS AT BEDTIME
Refills: 0 | Status: DISCONTINUED | OUTPATIENT
Start: 2024-12-04 | End: 2024-12-06

## 2024-12-04 RX ORDER — POTASSIUM CHLORIDE 600 MG/1
40 TABLET, EXTENDED RELEASE ORAL ONCE
Refills: 0 | Status: COMPLETED | OUTPATIENT
Start: 2024-12-04 | End: 2024-12-04

## 2024-12-04 RX ORDER — FUROSEMIDE 40 MG/1
40 TABLET ORAL
Refills: 0 | Status: DISCONTINUED | OUTPATIENT
Start: 2024-12-04 | End: 2024-12-04

## 2024-12-04 RX ORDER — INSULIN GLARGINE 100 [IU]/ML
10 INJECTION, SOLUTION SUBCUTANEOUS AT BEDTIME
Refills: 0 | Status: DISCONTINUED | OUTPATIENT
Start: 2024-12-04 | End: 2024-12-04

## 2024-12-04 RX ADMIN — Medication 0 UNIT(S)/HR: at 02:17

## 2024-12-04 RX ADMIN — Medication 5: at 13:21

## 2024-12-04 RX ADMIN — POTASSIUM CHLORIDE 100 MILLIEQUIVALENT(S): 600 TABLET, EXTENDED RELEASE ORAL at 11:40

## 2024-12-04 RX ADMIN — POTASSIUM CHLORIDE 40 MILLIEQUIVALENT(S): 600 TABLET, EXTENDED RELEASE ORAL at 09:30

## 2024-12-04 RX ADMIN — Medication 700 UNIT(S)/HR: at 10:31

## 2024-12-04 RX ADMIN — POTASSIUM CHLORIDE 100 MILLIEQUIVALENT(S): 600 TABLET, EXTENDED RELEASE ORAL at 09:28

## 2024-12-04 RX ADMIN — FUROSEMIDE 40 MILLIGRAM(S): 40 TABLET ORAL at 17:55

## 2024-12-04 RX ADMIN — ISOSORBIDE DINITRATE 30 MILLIGRAM(S): 40 TABLET ORAL at 05:31

## 2024-12-04 RX ADMIN — Medication 700 UNIT(S)/HR: at 17:52

## 2024-12-04 RX ADMIN — Medication 25 GRAM(S): at 07:57

## 2024-12-04 RX ADMIN — INSULIN GLARGINE 10 UNIT(S): 100 INJECTION, SOLUTION SUBCUTANEOUS at 21:54

## 2024-12-04 RX ADMIN — Medication 3: at 21:54

## 2024-12-04 RX ADMIN — Medication 2: at 17:51

## 2024-12-04 RX ADMIN — ISOSORBIDE DINITRATE 30 MILLIGRAM(S): 40 TABLET ORAL at 17:52

## 2024-12-04 RX ADMIN — METOPROLOL TARTRATE 50 MILLIGRAM(S): 100 TABLET, FILM COATED ORAL at 05:30

## 2024-12-04 RX ADMIN — Medication 700 UNIT(S)/HR: at 20:19

## 2024-12-04 RX ADMIN — ISOSORBIDE DINITRATE 30 MILLIGRAM(S): 40 TABLET ORAL at 11:40

## 2024-12-04 RX ADMIN — Medication 0 UNIT(S)/HR: at 09:25

## 2024-12-04 RX ADMIN — Medication 900 UNIT(S)/HR: at 03:30

## 2024-12-04 NOTE — PROGRESS NOTE ADULT - SUBJECTIVE AND OBJECTIVE BOX
No dyspnea on RA   OOB to chair  Complaining of IV  K+ burning  /76  HR 69  Lungs clear  Irregular rhythm  No edema    BUN 84  Crt 2.38  K+ 2.9  Mg++ 3.7    Imp:  Baseline BUN 27  Crt 1.57 on 6/22/24   Rec:  Hold Lasix for 1-2 days and then resume at a lower dose.  Continue to supplement K+  40 mEq PO q4-6h X 3  Daily BMP

## 2024-12-04 NOTE — PROGRESS NOTE ADULT - SUBJECTIVE AND OBJECTIVE BOX
Date of service: 12-04-24 @ 22:12      Patient is a 69y old  Male who presents with a chief complaint of hypo K+  elevated BUN / Crt (04 Dec 2024 11:58)                                                               INTERVAL HPI/OVERNIGHT EVENTS:    REVIEW OF SYSTEMS:    no comaplints                                                                                                                                                                                                                                                                            Medications:  MEDICATIONS  (STANDING):  dextrose 5%. 1000 milliLiter(s) (50 mL/Hr) IV Continuous <Continuous>  dextrose 5%. 1000 milliLiter(s) (100 mL/Hr) IV Continuous <Continuous>  dextrose 50% Injectable 25 Gram(s) IV Push once  dextrose 50% Injectable 12.5 Gram(s) IV Push once  dextrose 50% Injectable 25 Gram(s) IV Push once  glucagon  Injectable 1 milliGRAM(s) IntraMuscular once  heparin  Infusion.  Unit(s)/Hr (11 mL/Hr) IV Continuous <Continuous>  insulin glargine Injectable (LANTUS) 10 Unit(s) SubCutaneous at bedtime  insulin lispro (ADMELOG) corrective regimen sliding scale   SubCutaneous three times a day before meals  insulin lispro (ADMELOG) corrective regimen sliding scale   SubCutaneous at bedtime  isosorbide   dinitrate Tablet (ISORDIL) 30 milliGRAM(s) Oral three times a day  metoprolol succinate ER 50 milliGRAM(s) Oral daily  spironolactone 50 milliGRAM(s) Oral daily    MEDICATIONS  (PRN):  acetaminophen     Tablet .. 650 milliGRAM(s) Oral every 6 hours PRN Mild Pain (1 - 3), Moderate Pain (4 - 6)  dextrose Oral Gel 15 Gram(s) Oral once PRN Blood Glucose LESS THAN 70 milliGRAM(s)/deciliter  heparin   Injectable 4500 Unit(s) IV Push every 6 hours PRN For aPTT less than 40  heparin   Injectable 2000 Unit(s) IV Push every 6 hours PRN For aPTT between 40 - 57       Allergies    atorvastatin (Muscle Pain (Severe))    Intolerances      Vital Signs Last 24 Hrs  T(C): 36.4 (04 Dec 2024 20:56), Max: 36.4 (04 Dec 2024 12:30)  T(F): 97.5 (04 Dec 2024 20:56), Max: 97.6 (04 Dec 2024 12:30)  HR: 85 (04 Dec 2024 20:56) (69 - 85)  BP: 144/79 (04 Dec 2024 20:56) (144/79 - 152/75)  BP(mean): --  RR: 18 (04 Dec 2024 20:56) (18 - 18)  SpO2: 99% (04 Dec 2024 20:56) (98% - 99%)    Parameters below as of 04 Dec 2024 20:56  Patient On (Oxygen Delivery Method): room air      CAPILLARY BLOOD GLUCOSE      POCT Blood Glucose.: 384 mg/dL (04 Dec 2024 21:28)  POCT Blood Glucose.: 230 mg/dL (04 Dec 2024 17:18)  POCT Blood Glucose.: 351 mg/dL (04 Dec 2024 12:43)  POCT Blood Glucose.: 216 mg/dL (04 Dec 2024 08:18)  POCT Blood Glucose.: 64 mg/dL (04 Dec 2024 07:24)      12-04 @ 07:01  -  12-04 @ 22:12  --------------------------------------------------------  IN: 0 mL / OUT: 1750 mL / NET: -1750 mL      Physical Exam:    Daily     Daily   General:  Well appearing, NAD, not cachetic  HEENT:  Nonicteric, PERRLA  CV:  RRR, S1S2   Lungs:  CTA B/L, no wheezes, rales, rhonchi  Abdomen:  Soft, non-tender, no distended, positive BS  Extremities:  no edema                                                                                                                                                                                                                                                                                       LABS:                               15.7   10.25 )-----------( 287      ( 04 Dec 2024 06:44 )             45.2                      12-04    141  |  92[L]  |  84[H]  ----------------------------<  39[LL]  2.9[LL]   |  33[H]  |  2.38[H]    Ca    10.4      04 Dec 2024 06:42  Phos  3.4     12-04  Mg     3.7     12-04    TPro  7.8  /  Alb  4.4  /  TBili  1.6[H]  /  DBili  x   /  AST  32  /  ALT  16  /  AlkPhos  124[H]  12-04                       RADIOLOGY & ADDITIONAL TESTS         I personally reviewed: [  ]EKG   [  ]CXR    [  ] CT      A/P:         Discussed with :     Derek consultants' Notes   Time spent :

## 2024-12-04 NOTE — CHART NOTE - NSCHARTNOTEFT_GEN_A_CORE
12/4   Hypoglycemia - 39 on serum glucose and Finger stick blood sugar 60s.   - Pt Asymptomatic  - Blood sugars improved after dextrose IVP  - Lantus reduced by 50%    [id number]

## 2024-12-04 NOTE — PROGRESS NOTE ADULT - SUBJECTIVE AND OBJECTIVE BOX
Overnight events noted      VITAL:  T(C): , Max: 36.7 (12-03-24 @ 15:17)  T(F): , Max: 98 (12-03-24 @ 15:17)  HR: 69 (12-04-24 @ 04:00)  BP: 149/76 (12-04-24 @ 04:00)  BP(mean): 92 (12-03-24 @ 15:17)  RR: 18 (12-04-24 @ 04:00)  SpO2: 98% (12-04-24 @ 04:00)  Wt(kg): --      PHYSICAL EXAM:  Constitutional: NAD, Alert  HEENT: NCAT, DMM  Neck: Supple, No JVD  Respiratory: CTA-b/l  Cardiovascular: RRR s1s2, no m/r/g  Gastrointestinal: BS+, soft, NT/ND  Extremities: No peripheral edema b/l  Neurological: no focal deficits; strength grossly intact  Back: no CVAT b/l  Skin: (+)erythema of dorsum of right foot    LABS:                        15.7   10.25 )-----------( 287      ( 04 Dec 2024 06:44 )             45.2     Na(141)/K(2.9)/Cl(92)/HCO3(33)/BUN(84)/Cr(2.38)Glu(39)/Ca(10.4)/Mg(3.7)/PO4(3.4)    12-04 @ 06:42  Na(132)/K(3.8)/Cl(89)/HCO3(29)/BUN(89)/Cr(2.41)Glu(441)/Ca(9.4)/Mg(--)/PO4(--)    12-03 @ 18:27  Na(134)/K(2.6)/Cl(86)/HCO3(27)/BUN(97)/Cr(2.54)Glu(155)/Ca(9.8)/Mg(2.9)/PO4(4.9)    12-03 @ 10:30      IMPRESSION: 69M w/ HTN, DM2, CAD, AFIb, HFrEF, and CKD4, 12/3/24 p/w hypokalemia    (1)Renal- CKD - nonproteinuric stage 4 - due to hypertension/atherosclerotic disease +/- component from diabetic nephropathy. Near baseline.    (2)Hypokalemia - diuretic-associated - at this point, I would advocate for switchover from a thiazide diuretic over to a K+ sparing diuretic; even with standing K+ repletion, it appears that he will remain at risk for severe hyperkalemia if we maintain the diuretic regimen as presently constituted. He is ordered for KCL 40meq po x 1 and KCL 10meq iv x 3 today - this seems reasonable.    (3)Vasc - ?PAD      RECOMMEND:  (1)K+ repletion as ordered  (2)Reinitiate Lasix 40mg po bid  (3)Add Spironolactone 50mg po daily  (4)No further Metolazone  (5)Noninvasive PAD workup as planned; if testing demonstrates significant PAD and if creatinine remains below 3, I would not object to angiography (with minimization of contrast as able)  (6)Meds for GFR 20-30ml/min        Markell Walton MD  [facility]  Office/on call physician: [phone]  Cell (7a-7t): [phone]       No pain, no sob      VITAL:  T(C): , Max: 36.7 (12-03-24 @ 15:17)  T(F): , Max: 98 (12-03-24 @ 15:17)  HR: 69 (12-04-24 @ 04:00)  BP: 149/76 (12-04-24 @ 04:00)  BP(mean): 92 (12-03-24 @ 15:17)  RR: 18 (12-04-24 @ 04:00)  SpO2: 98% (12-04-24 @ 04:00)  Wt(kg): --      PHYSICAL EXAM:  Constitutional: NAD, Alert  HEENT: NCAT, DMM  Neck: Supple, No JVD  Respiratory: CTA-b/l  Cardiovascular: RRR s1s2, no m/r/g  Gastrointestinal: BS+, soft, NT/ND  Extremities: No peripheral edema b/l  Neurological: no focal deficits; strength grossly intact  Back: no CVAT b/l  Skin: (+)erythema of dorsum of right foot    LABS:                        15.7   10.25 )-----------( 287      ( 04 Dec 2024 06:44 )             45.2     Na(141)/K(2.9)/Cl(92)/HCO3(33)/BUN(84)/Cr(2.38)Glu(39)/Ca(10.4)/Mg(3.7)/PO4(3.4)    12-04 @ 06:42  Na(132)/K(3.8)/Cl(89)/HCO3(29)/BUN(89)/Cr(2.41)Glu(441)/Ca(9.4)/Mg(--)/PO4(--)    12-03 @ 18:27  Na(134)/K(2.6)/Cl(86)/HCO3(27)/BUN(97)/Cr(2.54)Glu(155)/Ca(9.8)/Mg(2.9)/PO4(4.9)    12-03 @ 10:30      IMPRESSION: 69M w/ HTN, DM2, CAD, AFIb, HFrEF, and CKD4, 12/3/24 p/w hypokalemia    (1)Renal- CKD - nonproteinuric stage 4 - due to hypertension/atherosclerotic disease +/- component from diabetic nephropathy. Near baseline.    (2)Hypokalemia - diuretic-associated - at this point, I would advocate for switchover from a thiazide diuretic over to a K+ sparing diuretic; even with standing K+ repletion, it appears that he will remain at risk for severe hyperkalemia if we maintain the diuretic regimen as presently constituted. He is ordered for KCL 40meq po x 1 and KCL 10meq iv x 3 today - this seems reasonable.    (3)Vasc - ?PAD      RECOMMEND:  (1)K+ repletion as ordered  (2)Reinitiate Lasix 40mg po bid  (3)Add Spironolactone 50mg po daily  (4)No further Metolazone  (5)Noninvasive PAD workup as planned; if testing demonstrates significant PAD and if creatinine remains below 3, I would not object to angiography (with minimization of contrast as able)  (6)Meds for GFR 20-30ml/min        Markell Walton MD  [facility]  Office/on call physician: [phone]  Cell (7a-7p): [phone]

## 2024-12-04 NOTE — PROVIDER CONTACT NOTE (CRITICAL VALUE NOTIFICATION) - ASSESSMENT
A&O x4, VSS, no chest pain, no SOB, no palpitations, no dizziness. Patient is on RA. IVL is WDL. asymptomatic
A&O x4, VSS, no chest pain, no SOB, no palpitations, no dizziness. Patient is on RA. On heparin drip at 11ml/hr. Asymptomatic
AAOx4. VSS; denies CP, SOB, no acute events on my time.

## 2024-12-04 NOTE — PROVIDER CONTACT NOTE (CRITICAL VALUE NOTIFICATION) - BACKGROUND
(Admit Diagnosis) Hypokalemia  68 y/o male with hx of CAD s/p multiple  stents, HFrEF (EF~50% as of 11/26/23), A-fib, HTN, HLD, T2DM, CKD3, former smoker
Glucose 39 and Potassium 2.9  68 y/o male with hx of CAD s/p multiple  stents, HFrEF (EF~50% as of 11/26/23), A-fib, HTN, HLD, T2DM, CKD3, former smoker.
Pt adm with hypokalemia, on hep gtt

## 2024-12-04 NOTE — PROVIDER CONTACT NOTE (CRITICAL VALUE NOTIFICATION) - ACTION/TREATMENT ORDERED:
provider aware and notified- nomogram followed- held heparin drip for one hour then restarted at 9ml/hr
provide aware, will follow ACS nomogram.
provider aware and notified- ordered to follow protocol for glucose. dextrose 50% injectable given for glucose less than 100.

## 2024-12-05 LAB
A1C WITH ESTIMATED AVERAGE GLUCOSE RESULT: 6.5 % — HIGH (ref 4–5.6)
ADD ON TEST-SPECIMEN IN LAB: SIGNIFICANT CHANGE UP
ANION GAP SERPL CALC-SCNC: 14 MMOL/L — SIGNIFICANT CHANGE UP (ref 5–17)
APTT BLD: 71.3 SEC — HIGH (ref 24.5–35.6)
BUN SERPL-MCNC: 81 MG/DL — HIGH (ref 7–23)
CALCIUM SERPL-MCNC: 10 MG/DL — SIGNIFICANT CHANGE UP (ref 8.4–10.5)
CHLORIDE SERPL-SCNC: 93 MMOL/L — LOW (ref 96–108)
CO2 SERPL-SCNC: 32 MMOL/L — HIGH (ref 22–31)
CREAT SERPL-MCNC: 2.21 MG/DL — HIGH (ref 0.5–1.3)
EGFR: 32 ML/MIN/1.73M2 — LOW
ESTIMATED AVERAGE GLUCOSE: 140 MG/DL — HIGH (ref 68–114)
GLUCOSE BLDC GLUCOMTR-MCNC: 126 MG/DL — HIGH (ref 70–99)
GLUCOSE BLDC GLUCOMTR-MCNC: 207 MG/DL — HIGH (ref 70–99)
GLUCOSE BLDC GLUCOMTR-MCNC: 217 MG/DL — HIGH (ref 70–99)
GLUCOSE BLDC GLUCOMTR-MCNC: 341 MG/DL — HIGH (ref 70–99)
GLUCOSE SERPL-MCNC: 92 MG/DL — SIGNIFICANT CHANGE UP (ref 70–99)
HCT VFR BLD CALC: 44.8 % — SIGNIFICANT CHANGE UP (ref 39–50)
HGB BLD-MCNC: 15.6 G/DL — SIGNIFICANT CHANGE UP (ref 13–17)
MAGNESIUM SERPL-MCNC: 3.2 MG/DL — HIGH (ref 1.6–2.6)
MAGNESIUM SERPL-MCNC: 3.2 MG/DL — HIGH (ref 1.6–2.6)
MCHC RBC-ENTMCNC: 32.3 PG — SIGNIFICANT CHANGE UP (ref 27–34)
MCHC RBC-ENTMCNC: 34.8 G/DL — SIGNIFICANT CHANGE UP (ref 32–36)
MCV RBC AUTO: 92.8 FL — SIGNIFICANT CHANGE UP (ref 80–100)
NRBC # BLD: 0 /100 WBCS — SIGNIFICANT CHANGE UP (ref 0–0)
PLATELET # BLD AUTO: 282 K/UL — SIGNIFICANT CHANGE UP (ref 150–400)
POTASSIUM SERPL-MCNC: 3 MMOL/L — LOW (ref 3.5–5.3)
POTASSIUM SERPL-MCNC: 3.4 MMOL/L — LOW (ref 3.5–5.3)
POTASSIUM SERPL-MCNC: 4.8 MMOL/L — SIGNIFICANT CHANGE UP (ref 3.5–5.3)
POTASSIUM SERPL-SCNC: 3 MMOL/L — LOW (ref 3.5–5.3)
POTASSIUM SERPL-SCNC: 3.4 MMOL/L — LOW (ref 3.5–5.3)
POTASSIUM SERPL-SCNC: 4.8 MMOL/L — SIGNIFICANT CHANGE UP (ref 3.5–5.3)
RBC # BLD: 4.83 M/UL — SIGNIFICANT CHANGE UP (ref 4.2–5.8)
RBC # FLD: 13.8 % — SIGNIFICANT CHANGE UP (ref 10.3–14.5)
SODIUM SERPL-SCNC: 139 MMOL/L — SIGNIFICANT CHANGE UP (ref 135–145)
WBC # BLD: 9.93 K/UL — SIGNIFICANT CHANGE UP (ref 3.8–10.5)
WBC # FLD AUTO: 9.93 K/UL — SIGNIFICANT CHANGE UP (ref 3.8–10.5)

## 2024-12-05 PROCEDURE — 99232 SBSQ HOSP IP/OBS MODERATE 35: CPT

## 2024-12-05 PROCEDURE — 93010 ELECTROCARDIOGRAM REPORT: CPT

## 2024-12-05 RX ORDER — POTASSIUM CHLORIDE 600 MG/1
40 TABLET, EXTENDED RELEASE ORAL EVERY 4 HOURS
Refills: 0 | Status: DISCONTINUED | OUTPATIENT
Start: 2024-12-05 | End: 2024-12-05

## 2024-12-05 RX ORDER — POTASSIUM CHLORIDE 600 MG/1
40 TABLET, EXTENDED RELEASE ORAL EVERY 4 HOURS
Refills: 0 | Status: COMPLETED | OUTPATIENT
Start: 2024-12-05 | End: 2024-12-05

## 2024-12-05 RX ADMIN — Medication 700 UNIT(S)/HR: at 00:21

## 2024-12-05 RX ADMIN — Medication 2: at 12:30

## 2024-12-05 RX ADMIN — Medication 4 UNIT(S): at 08:35

## 2024-12-05 RX ADMIN — POTASSIUM CHLORIDE 40 MILLIEQUIVALENT(S): 600 TABLET, EXTENDED RELEASE ORAL at 12:30

## 2024-12-05 RX ADMIN — METOPROLOL TARTRATE 50 MILLIGRAM(S): 100 TABLET, FILM COATED ORAL at 06:07

## 2024-12-05 RX ADMIN — Medication 2: at 21:34

## 2024-12-05 RX ADMIN — ISOSORBIDE DINITRATE 30 MILLIGRAM(S): 40 TABLET ORAL at 06:07

## 2024-12-05 RX ADMIN — Medication 4 UNIT(S): at 12:31

## 2024-12-05 RX ADMIN — Medication 700 UNIT(S)/HR: at 19:50

## 2024-12-05 RX ADMIN — ISOSORBIDE DINITRATE 30 MILLIGRAM(S): 40 TABLET ORAL at 13:17

## 2024-12-05 RX ADMIN — Medication 2: at 17:23

## 2024-12-05 RX ADMIN — Medication 50 MILLIGRAM(S): at 06:07

## 2024-12-05 RX ADMIN — INSULIN GLARGINE 15 UNIT(S): 100 INJECTION, SOLUTION SUBCUTANEOUS at 21:34

## 2024-12-05 RX ADMIN — POTASSIUM CHLORIDE 40 MILLIEQUIVALENT(S): 600 TABLET, EXTENDED RELEASE ORAL at 08:07

## 2024-12-05 RX ADMIN — Medication 700 UNIT(S)/HR: at 08:07

## 2024-12-05 RX ADMIN — ISOSORBIDE DINITRATE 30 MILLIGRAM(S): 40 TABLET ORAL at 21:03

## 2024-12-05 RX ADMIN — POTASSIUM CHLORIDE 40 MILLIEQUIVALENT(S): 600 TABLET, EXTENDED RELEASE ORAL at 06:08

## 2024-12-05 RX ADMIN — Medication 4 UNIT(S): at 17:23

## 2024-12-05 NOTE — PROGRESS NOTE ADULT - SUBJECTIVE AND OBJECTIVE BOX
Date of service: 24 @ 22:58      Patient is a 69y old  Male who presents with a chief complaint of hypo K+  elevated BUN / Crt (04 Dec 2024 11:58)                                                               INTERVAL HPI/OVERNIGHT EVENTS:    REVIEW OF SYSTEMS:     no complaints                                                                                                                                                                                                                                                                       Medications:  MEDICATIONS  (STANDING):  dextrose 5%. 1000 milliLiter(s) (100 mL/Hr) IV Continuous <Continuous>  dextrose 5%. 1000 milliLiter(s) (50 mL/Hr) IV Continuous <Continuous>  dextrose 50% Injectable 25 Gram(s) IV Push once  dextrose 50% Injectable 12.5 Gram(s) IV Push once  dextrose 50% Injectable 25 Gram(s) IV Push once  glucagon  Injectable 1 milliGRAM(s) IntraMuscular once  heparin  Infusion.  Unit(s)/Hr (11 mL/Hr) IV Continuous <Continuous>  insulin glargine Injectable (LANTUS) 15 Unit(s) SubCutaneous at bedtime  insulin lispro (ADMELOG) corrective regimen sliding scale   SubCutaneous three times a day before meals  insulin lispro (ADMELOG) corrective regimen sliding scale   SubCutaneous at bedtime  insulin lispro Injectable (ADMELOG) 4 Unit(s) SubCutaneous three times a day before meals  isosorbide   dinitrate Tablet (ISORDIL) 30 milliGRAM(s) Oral three times a day  metoprolol succinate ER 50 milliGRAM(s) Oral daily  spironolactone 50 milliGRAM(s) Oral daily    MEDICATIONS  (PRN):  acetaminophen     Tablet .. 650 milliGRAM(s) Oral every 6 hours PRN Mild Pain (1 - 3), Moderate Pain (4 - 6)  dextrose Oral Gel 15 Gram(s) Oral once PRN Blood Glucose LESS THAN 70 milliGRAM(s)/deciliter  heparin   Injectable 4500 Unit(s) IV Push every 6 hours PRN For aPTT less than 40  heparin   Injectable 2000 Unit(s) IV Push every 6 hours PRN For aPTT between 40 - 57       Allergies    atorvastatin (Muscle Pain (Severe))    Intolerances      Vital Signs Last 24 Hrs  T(C): 36.6 (05 Dec 2024 20:49), Max: 36.6 (05 Dec 2024 20:49)  T(F): 97.8 (05 Dec 2024 20:49), Max: 97.8 (05 Dec 2024 20:49)  HR: 65 (05 Dec 2024 20:49) (65 - 84)  BP: 152/76 (05 Dec 2024 21:06) (111/73 - 152/76)  BP(mean): --  RR: 18 (05 Dec 2024 20:49) (18 - 18)  SpO2: 97% (05 Dec 2024 20:49) (96% - 98%)    Parameters below as of 05 Dec 2024 20:49  Patient On (Oxygen Delivery Method): room air      CAPILLARY BLOOD GLUCOSE      POCT Blood Glucose.: 341 mg/dL (05 Dec 2024 21:27)  POCT Blood Glucose.: 217 mg/dL (05 Dec 2024 16:40)  POCT Blood Glucose.: 207 mg/dL (05 Dec 2024 12:28)  POCT Blood Glucose.: 126 mg/dL (05 Dec 2024 07:50)       @ 07:  -   @ 07:00  --------------------------------------------------------  IN: 0 mL / OUT: 2925 mL / NET: -2925 mL     @ 07:  -   @ 22:58  --------------------------------------------------------  IN: 0 mL / OUT: 1750 mL / NET: -1750 mL      Physical Exam:    Daily     Daily Weight in k.9 (05 Dec 2024 14:45)  General:  Well appearing, NAD, not cachetic  HEENT:  Nonicteric, PERRLA  CV:  RRR, S1S2   Lungs:  CTA B/L, no wheezes, rales, rhonchi  Abdomen:  Soft, non-tender, no distended, positive BS  Extremities: no edema                                                                                                                                                                                                                                                                                LABS:                               15.6   9.93  )-----------( 282      ( 05 Dec 2024 06:21 )             44.8                      12-05    x   |  x   |  x   ----------------------------<  x   4.8   |  x   |  x     Ca    10.0      05 Dec 2024 06:21  Phos  3.4     12-  Mg     3.2     12-    TPro  7.8  /  Alb  4.4  /  TBili  1.6[H]  /  DBili  x   /  AST  32  /  ALT  16  /  AlkPhos  124[H]  12-04                       RADIOLOGY & ADDITIONAL TESTS         I personally reviewed: [  ]EKG   [  ]CXR    [  ] CT      A/P:         Discussed with :     Derek consultants' Notes   Time spent :

## 2024-12-05 NOTE — PROVIDER CONTACT NOTE (OTHER) - ASSESSMENT
A&O x4, VSS, no chest pain, no SOB, no palpitations, no dizziness. Patient is on RA. IVL is WDL. heparin drip at 7ml/hr. Asymptomatic

## 2024-12-05 NOTE — PROVIDER CONTACT NOTE (OTHER) - BACKGROUND
(Admit Diagnosis) Hypokalemia  68 y/o male with hx of CAD s/p multiple  stents, HFrEF (EF~50% as of 11/26/23), A-fib on Xarelto, HTN, HLD, T2DM, CKD3, remote L CEA, and known PAD  former smoker

## 2024-12-05 NOTE — PROGRESS NOTE ADULT - SUBJECTIVE AND OBJECTIVE BOX
SUBJECTIVE:  NO CP no SOB.   MEDICATIONS:  isosorbide   dinitrate Tablet (ISORDIL) 30 milliGRAM(s) Oral three times a day  metoprolol succinate ER 50 milliGRAM(s) Oral daily  spironolactone 50 milliGRAM(s) Oral daily    acetaminophen     Tablet .. 650 milliGRAM(s) Oral every 6 hours PRN    dextrose 50% Injectable 25 Gram(s) IV Push once  dextrose 50% Injectable 12.5 Gram(s) IV Push once  dextrose 50% Injectable 25 Gram(s) IV Push once  dextrose Oral Gel 15 Gram(s) Oral once PRN  glucagon  Injectable 1 milliGRAM(s) IntraMuscular once  insulin glargine Injectable (LANTUS) 15 Unit(s) SubCutaneous at bedtime  insulin lispro (ADMELOG) corrective regimen sliding scale   SubCutaneous three times a day before meals  insulin lispro (ADMELOG) corrective regimen sliding scale   SubCutaneous at bedtime  insulin lispro Injectable (ADMELOG) 4 Unit(s) SubCutaneous three times a day before meals    dextrose 5%. 1000 milliLiter(s) IV Continuous <Continuous>  dextrose 5%. 1000 milliLiter(s) IV Continuous <Continuous>  heparin   Injectable 4500 Unit(s) IV Push every 6 hours PRN  heparin   Injectable 2000 Unit(s) IV Push every 6 hours PRN  heparin  Infusion.  Unit(s)/Hr IV Continuous <Continuous>  potassium chloride    Tablet ER 40 milliEquivalent(s) Oral every 4 hours      REVIEW OF SYSTEMS:    CONSTITUTIONAL: No fever, weight loss, or fatigue  EYES: No eye pain, visual disturbances, or discharge  NECK: No pain or stiffness  RESPIRATORY: No cough, wheezing, chills or hemoptysis; No Shortness of Breath  CARDIOVASCULAR: No chest pain, palpitations, dizziness, or leg swelling  GASTROINTESTINAL: No abdominal or epigastric pain. No nausea, vomiting, or hematemesis; No diarrhea or constipation. No melena or hematochezia.  GENITOURINARY: No dysuria, frequency, hematuria, or incontinence  NEUROLOGICAL: No headaches, memory loss, loss of strength, numbness, or tremors  SKIN: No itching, burning, rashes, or lesions   LYMPH Nodes: No enlarged glands  MUSCULOSKELETAL: No joint pain or swelling; No muscle, back, or extremity pain  All other review of systems are negative.  	  [ ] Unable to obtain    PHYSICAL EXAM:  T(C): 36.4 (12-05-24 @ 04:00), Max: 36.4 (12-04-24 @ 12:30)  HR: 76 (12-05-24 @ 04:00) (70 - 85)  BP: 142/78 (12-05-24 @ 04:00) (111/73 - 152/75)  RR: 18 (12-05-24 @ 04:00) (18 - 18)  SpO2: 98% (12-05-24 @ 04:00) (96% - 99%)  Wt(kg): --  I&O's Summary    04 Dec 2024 07:01  -  05 Dec 2024 07:00  --------------------------------------------------------  IN: 0 mL / OUT: 2925 mL / NET: -2925 mL          PHYSICAL EXAM    Appearance: Normal	  HEENT:   Normal oral mucosa, PERRL, EOMI	  NECK: Soft and supple, No LAD, No JVD  Cardiovascular: Regular Rate and Rhythm, Normal S1 S2, No murmurs, No clicks, gallops or rubs  Respiratory: Lungs clear to auscultation	  Gastrointestinal:  Soft, Non-tender, + BS	  Skin: No rashes, No ecchymoses, No cyanosis  Neurologic: Non-focal  Extremities: No clubbing, cyanosis or edema  Vascular: Peripheral pulses palpable L not right reddish coloration     LABS:	 	                                  15.6   9.93  )-----------( 282      ( 05 Dec 2024 06:21 )             44.8     12-05    139  |  93[L]  |  81[H]  ----------------------------<  92  3.0[L]   |  32[H]  |  2.21[H]    Ca    10.0      05 Dec 2024 06:21  Phos  3.4     12-04  Mg     3.2     12-05    TPro  7.8  /  Alb  4.4  /  TBili  1.6[H]  /  DBili  x   /  AST  32  /  ALT  16  /  AlkPhos  124[H]  12-04

## 2024-12-05 NOTE — PROGRESS NOTE ADULT - SUBJECTIVE AND OBJECTIVE BOX
Overnight events noted      VITAL:  T(C): , Max: 36.4 (12-04-24 @ 12:30)  T(F): , Max: 97.6 (12-04-24 @ 12:30)  HR: 76 (12-05-24 @ 04:00)  BP: 142/78 (12-05-24 @ 04:00)  BP(mean): --  RR: 18 (12-05-24 @ 04:00)  SpO2: 98% (12-05-24 @ 04:00)  Wt(kg): --      PHYSICAL EXAM:  Constitutional: NAD, Alert  HEENT: NCAT, DMM  Neck: Supple, No JVD  Respiratory: CTA-b/l  Cardiovascular: RRR s1s2, no m/r/g  Gastrointestinal: BS+, soft, NT/ND  Extremities: No peripheral edema b/l  Neurological: no focal deficits; strength grossly intact  Back: no CVAT b/l  Skin: (+)erythema of dorsum of right foot    LABS:                        15.6   9.93  )-----------( 282      ( 05 Dec 2024 06:21 )             44.8     Na(139)/K(3.0)/Cl(93)/HCO3(32)/BUN(81)/Cr(2.21)Glu(92)/Ca(10.0)/Mg(3.2)/PO4(--)    12-05 @ 06:21  Na(--)/K(3.4)/Cl(--)/HCO3(--)/BUN(--)/Cr(--)Glu(--)/Ca(--)/Mg(3.2)/PO4(--)    12-04 @ 23:55  Na(141)/K(2.9)/Cl(92)/HCO3(33)/BUN(84)/Cr(2.38)Glu(39)/Ca(10.4)/Mg(3.7)/PO4(3.4)    12-04 @ 06:42  Na(132)/K(3.8)/Cl(89)/HCO3(29)/BUN(89)/Cr(2.41)Glu(441)/Ca(9.4)/Mg(--)/PO4(--)    12-03 @ 18:27  Na(134)/K(2.6)/Cl(86)/HCO3(27)/BUN(97)/Cr(2.54)Glu(155)/Ca(9.8)/Mg(2.9)/PO4(4.9)    12-03 @ 10:30      IMPRESSION: 69M w/ HTN, DM2, CAD, AFIb, HFrEF, and CKD4, 12/3/24 p/w hypokalemia    (1)Renal- CKD - nonproteinuric stage 4 - due to hypertension/atherosclerotic disease +/- component from diabetic nephropathy. Near baseline.    (2)Hypokalemia - slowly improving, with repletion, and with adjustment in diuretics    (3)Vasc - ?PAD      RECOMMEND:  (1)K+ repletion as ordered  (2)Spironolactone 50mg po daily as ordered  (3)Can reintroduce Lasix once K+ trends up to 3.3 or higher  (4)F/U Vascular input  (5)Meds for GFR 20-30ml/min            Markell Walton MD  [facility]  Office/on call physician: [phone]  Cell (7a-7p): [phone]       no pain, no sob      VITAL:  T(C): , Max: 36.4 (12-04-24 @ 12:30)  T(F): , Max: 97.6 (12-04-24 @ 12:30)  HR: 76 (12-05-24 @ 04:00)  BP: 142/78 (12-05-24 @ 04:00)  BP(mean): --  RR: 18 (12-05-24 @ 04:00)  SpO2: 98% (12-05-24 @ 04:00)  Wt(kg): --      PHYSICAL EXAM:  Constitutional: NAD, Alert  HEENT: NCAT, DMM  Neck: Supple, No JVD  Respiratory: CTA-b/l  Cardiovascular: RRR s1s2, no m/r/g  Gastrointestinal: BS+, soft, NT/ND  Extremities: No peripheral edema b/l  Neurological: no focal deficits; strength grossly intact  Back: no CVAT b/l  Skin: (+)erythema of dorsum of right foot    LABS:                        15.6   9.93  )-----------( 282      ( 05 Dec 2024 06:21 )             44.8     Na(139)/K(3.0)/Cl(93)/HCO3(32)/BUN(81)/Cr(2.21)Glu(92)/Ca(10.0)/Mg(3.2)/PO4(--)    12-05 @ 06:21  Na(--)/K(3.4)/Cl(--)/HCO3(--)/BUN(--)/Cr(--)Glu(--)/Ca(--)/Mg(3.2)/PO4(--)    12-04 @ 23:55  Na(141)/K(2.9)/Cl(92)/HCO3(33)/BUN(84)/Cr(2.38)Glu(39)/Ca(10.4)/Mg(3.7)/PO4(3.4)    12-04 @ 06:42  Na(132)/K(3.8)/Cl(89)/HCO3(29)/BUN(89)/Cr(2.41)Glu(441)/Ca(9.4)/Mg(--)/PO4(--)    12-03 @ 18:27  Na(134)/K(2.6)/Cl(86)/HCO3(27)/BUN(97)/Cr(2.54)Glu(155)/Ca(9.8)/Mg(2.9)/PO4(4.9)    12-03 @ 10:30      IMPRESSION: 69M w/ HTN, DM2, CAD, AFIb, HFrEF, and CKD4, 12/3/24 p/w hypokalemia    (1)Renal- CKD - nonproteinuric stage 4 - due to hypertension/atherosclerotic disease +/- component from diabetic nephropathy. Near baseline.    (2)Hypokalemia - slowly improving, with repletion, and with adjustment in diuretics    (3)Vasc - ?PAD      RECOMMEND:  (1)K+ repletion as ordered  (2)Spironolactone 50mg po daily as ordered  (3)Can reintroduce Lasix once K+ trends up to 3.3 or higher  (4)F/U Vascular input  (5)Meds for GFR 20-30ml/min            Markell Walton MD  [facility]  Office/on call physician: [phone]  Cell (7a-7p): [phone]

## 2024-12-05 NOTE — PROGRESS NOTE ADULT - SUBJECTIVE AND OBJECTIVE BOX
SURGERY DAILY PROGRESS NOTE    STATUS POST:      SUBJECTIVE: Patient seen and examined on AM rounds. Denies chest pain, SOB, palpitations, HA, fever, chills, N/V. No new complaints.      OBJECTIVE:  Vital Signs Last 24 Hrs  T(C): 36.4 (05 Dec 2024 12:51), Max: 36.4 (04 Dec 2024 20:56)  T(F): 97.5 (05 Dec 2024 12:51), Max: 97.5 (04 Dec 2024 20:56)  HR: 84 (05 Dec 2024 12:51) (76 - 85)  BP: 147/79 (05 Dec 2024 12:51) (111/73 - 147/79)  BP(mean): --  RR: 18 (05 Dec 2024 12:51) (18 - 18)  SpO2: 98% (05 Dec 2024 12:51) (96% - 99%)    Parameters below as of 05 Dec 2024 12:51  Patient On (Oxygen Delivery Method): room air        I&O's Summary    04 Dec 2024 07:01  -  05 Dec 2024 07:00  --------------------------------------------------------  IN: 0 mL / OUT: 2925 mL / NET: -2925 mL    05 Dec 2024 07:01  -  05 Dec 2024 17:04  --------------------------------------------------------  IN: 0 mL / OUT: 850 mL / NET: -850 mL        Physical Exam:  General Appearance: Appears well, NAD, A& O x 3  Chest: Nonlabored breathing on room air  CV: Hemodynamically stable  Abdomen: Soft, nontense, appropriate incisional tenderness, dressings clean and dry and intact  Extremities: Grossly symmetric, WWP. No edema. 2+ L DP, nonpalp R pedal pulses. Motor/sensation grossly intact, continued rubrous discoloration of feet but no active wounds    LABS:                        15.6   9.93  )-----------( 282      ( 05 Dec 2024 06:21 )             44.8     12-05    139  |  93[L]  |  81[H]  ----------------------------<  92  3.0[L]   |  32[H]  |  2.21[H]    Ca    10.0      05 Dec 2024 06:21  Phos  3.4     12-04  Mg     3.2     12-05    TPro  7.8  /  Alb  4.4  /  TBili  1.6[H]  /  DBili  x   /  AST  32  /  ALT  16  /  AlkPhos  124[H]  12-04    PT/INR - ( 04 Dec 2024 06:41 )   PT: 20.4 sec;   INR: 1.78 ratio         PTT - ( 04 Dec 2024 23:55 )  PTT:71.3 sec  Urinalysis Basic - ( 05 Dec 2024 06:21 )    Color: x / Appearance: x / SG: x / pH: x  Gluc: 92 mg/dL / Ketone: x  / Bili: x / Urobili: x   Blood: x / Protein: x / Nitrite: x   Leuk Esterase: x / RBC: x / WBC x   Sq Epi: x / Non Sq Epi: x / Bacteria: x      RADIOLOGY & ADDITIONAL STUDIES:  < from: [facility] Physiol Extremity Lower 3+ Level, BI (12.04.24 @ 17:25) >  IMPRESSION:    Right brachial pressure is 149 mmHg. Right NIKHIL was not calculated due to   noncompressible vasculature. Left NIKHIL is 1.07.    Right toe pressure is 74 mmHg. Left toe pressure is 70 mmHg. Bilateral   toe brachial indices are abnormal, both approximately 0.50.    Segmental pressure evaluation is limited by noncompressible vasculature.    PVR waveforms are abnormal, particularly at calf, metatarsal, toe   stations, worse on the right, reflecting findings of recent arterial   duplex ultrasound.    --- End of Report ---    < end of copied text >

## 2024-12-05 NOTE — CHART NOTE - NSCHARTNOTEFT_GEN_A_CORE
MEDICINE KRISTY SANABRIA  69y Male    Patient is a 69y old  Male who presents with a chief complaint of hypo K+  elevated BUN / Crt (04 Dec 2024 11:58)     Notified by RN, patient with Ectopy on telemetry, 7 beat WCT, asymptomatic, VSS. Per RN, patient was sitting in bed at time of event. Patient seen at bedside; denies chest pain, SOB, palpitations, dizziness.       Vital Signs Last 24 Hrs  T(C): 36.4 (05 Dec 2024 04:00), Max: 36.4 (04 Dec 2024 12:30)  T(F): 97.5 (05 Dec 2024 04:00), Max: 97.6 (04 Dec 2024 12:30)  HR: 76 (05 Dec 2024 04:00) (70 - 85)  BP: 142/78 (05 Dec 2024 04:00) (111/73 - 152/75)  BP(mean): --  RR: 18 (05 Dec 2024 04:00) (18 - 18)  SpO2: 98% (05 Dec 2024 04:00) (96% - 99%)    Parameters below as of 05 Dec 2024 04:00  Patient On (Oxygen Delivery Method): room air    #WCT  > Patient asymptomatic and hemodynamically stable  > Telemetry reviewed w/ rhythm atrial fibrillation, HR 70-90s  > EKG shows atrial fibrillation with PVCs, no acute ST/T wave changes when compared to prior  > Monitor electrolytes and replete to keep K >4, Mg >2 --> K 3.4, PO potassium repletion ordered   > Continue with telemonitioring  > Will continue to monitor   > Will endorse to day ACP; attending to follow         Joi Weinstein PA-C  Dept of Medicine   Spectra [id number]

## 2024-12-06 ENCOUNTER — TRANSCRIPTION ENCOUNTER (OUTPATIENT)
Age: 69
End: 2024-12-06

## 2024-12-06 VITALS
SYSTOLIC BLOOD PRESSURE: 175 MMHG | TEMPERATURE: 98 F | OXYGEN SATURATION: 98 % | DIASTOLIC BLOOD PRESSURE: 99 MMHG | WEIGHT: 125 LBS | HEART RATE: 83 BPM | RESPIRATION RATE: 18 BRPM

## 2024-12-06 LAB
ADD ON TEST-SPECIMEN IN LAB: SIGNIFICANT CHANGE UP
ANION GAP SERPL CALC-SCNC: 16 MMOL/L — SIGNIFICANT CHANGE UP (ref 5–17)
APTT BLD: 72.3 SEC — HIGH (ref 24.5–35.6)
BUN SERPL-MCNC: 76 MG/DL — HIGH (ref 7–23)
CALCIUM SERPL-MCNC: 10.6 MG/DL — HIGH (ref 8.4–10.5)
CHLORIDE SERPL-SCNC: 93 MMOL/L — LOW (ref 96–108)
CO2 SERPL-SCNC: 28 MMOL/L — SIGNIFICANT CHANGE UP (ref 22–31)
CREAT SERPL-MCNC: 2.1 MG/DL — HIGH (ref 0.5–1.3)
EGFR: 34 ML/MIN/1.73M2 — LOW
GLUCOSE BLDC GLUCOMTR-MCNC: 148 MG/DL — HIGH (ref 70–99)
GLUCOSE BLDC GLUCOMTR-MCNC: 241 MG/DL — HIGH (ref 70–99)
GLUCOSE SERPL-MCNC: 197 MG/DL — HIGH (ref 70–99)
HCT VFR BLD CALC: 44.4 % — SIGNIFICANT CHANGE UP (ref 39–50)
HGB BLD-MCNC: 15.3 G/DL — SIGNIFICANT CHANGE UP (ref 13–17)
MAGNESIUM SERPL-MCNC: 2.9 MG/DL — HIGH (ref 1.6–2.6)
MCHC RBC-ENTMCNC: 32.6 PG — SIGNIFICANT CHANGE UP (ref 27–34)
MCHC RBC-ENTMCNC: 34.5 G/DL — SIGNIFICANT CHANGE UP (ref 32–36)
MCV RBC AUTO: 94.5 FL — SIGNIFICANT CHANGE UP (ref 80–100)
NRBC # BLD: 0 /100 WBCS — SIGNIFICANT CHANGE UP (ref 0–0)
PLATELET # BLD AUTO: 267 K/UL — SIGNIFICANT CHANGE UP (ref 150–400)
POTASSIUM SERPL-MCNC: 4.5 MMOL/L — SIGNIFICANT CHANGE UP (ref 3.5–5.3)
POTASSIUM SERPL-SCNC: 4.5 MMOL/L — SIGNIFICANT CHANGE UP (ref 3.5–5.3)
RBC # BLD: 4.7 M/UL — SIGNIFICANT CHANGE UP (ref 4.2–5.8)
RBC # FLD: 13.7 % — SIGNIFICANT CHANGE UP (ref 10.3–14.5)
SODIUM SERPL-SCNC: 137 MMOL/L — SIGNIFICANT CHANGE UP (ref 135–145)
WBC # BLD: 11.44 K/UL — HIGH (ref 3.8–10.5)
WBC # FLD AUTO: 11.44 K/UL — HIGH (ref 3.8–10.5)

## 2024-12-06 PROCEDURE — 93005 ELECTROCARDIOGRAM TRACING: CPT

## 2024-12-06 PROCEDURE — 83735 ASSAY OF MAGNESIUM: CPT

## 2024-12-06 PROCEDURE — 85610 PROTHROMBIN TIME: CPT

## 2024-12-06 PROCEDURE — 84132 ASSAY OF SERUM POTASSIUM: CPT

## 2024-12-06 PROCEDURE — 85730 THROMBOPLASTIN TIME PARTIAL: CPT

## 2024-12-06 PROCEDURE — 85027 COMPLETE CBC AUTOMATED: CPT

## 2024-12-06 PROCEDURE — 93925 LOWER EXTREMITY STUDY: CPT

## 2024-12-06 PROCEDURE — 83036 HEMOGLOBIN GLYCOSYLATED A1C: CPT

## 2024-12-06 PROCEDURE — 99285 EMERGENCY DEPT VISIT HI MDM: CPT

## 2024-12-06 PROCEDURE — 86850 RBC ANTIBODY SCREEN: CPT

## 2024-12-06 PROCEDURE — 96374 THER/PROPH/DIAG INJ IV PUSH: CPT

## 2024-12-06 PROCEDURE — 85025 COMPLETE CBC W/AUTO DIFF WBC: CPT

## 2024-12-06 PROCEDURE — 80048 BASIC METABOLIC PNL TOTAL CA: CPT

## 2024-12-06 PROCEDURE — 96375 TX/PRO/DX INJ NEW DRUG ADDON: CPT

## 2024-12-06 PROCEDURE — 86900 BLOOD TYPING SEROLOGIC ABO: CPT

## 2024-12-06 PROCEDURE — 84100 ASSAY OF PHOSPHORUS: CPT

## 2024-12-06 PROCEDURE — 36415 COLL VENOUS BLD VENIPUNCTURE: CPT

## 2024-12-06 PROCEDURE — 82550 ASSAY OF CK (CPK): CPT

## 2024-12-06 PROCEDURE — 86901 BLOOD TYPING SEROLOGIC RH(D): CPT

## 2024-12-06 PROCEDURE — 82962 GLUCOSE BLOOD TEST: CPT

## 2024-12-06 PROCEDURE — 86922 COMPATIBILITY TEST ANTIGLOB: CPT

## 2024-12-06 PROCEDURE — 93923 UPR/LXTR ART STDY 3+ LVLS: CPT

## 2024-12-06 PROCEDURE — 80053 COMPREHEN METABOLIC PANEL: CPT

## 2024-12-06 RX ORDER — RIVAROXABAN 10 MG/1
15 TABLET, FILM COATED ORAL
Refills: 0 | Status: DISCONTINUED | OUTPATIENT
Start: 2024-12-06 | End: 2024-12-06

## 2024-12-06 RX ORDER — METOPROLOL TARTRATE 100 MG/1
1 TABLET, FILM COATED ORAL
Refills: 0 | DISCHARGE

## 2024-12-06 RX ORDER — METOPROLOL TARTRATE 100 MG/1
1 TABLET, FILM COATED ORAL
Qty: 0 | Refills: 0 | DISCHARGE
Start: 2024-12-06

## 2024-12-06 RX ORDER — POTASSIUM CHLORIDE 600 MG/1
1 TABLET, EXTENDED RELEASE ORAL
Refills: 0 | DISCHARGE

## 2024-12-06 RX ORDER — POTASSIUM CHLORIDE 600 MG/1
1 TABLET, EXTENDED RELEASE ORAL
Qty: 30 | Refills: 0
Start: 2024-12-06 | End: 2025-01-04

## 2024-12-06 RX ORDER — SPIRONOLACTONE 25 MG
2 TABLET ORAL
Qty: 60 | Refills: 0
Start: 2024-12-06 | End: 2025-01-04

## 2024-12-06 RX ORDER — FUROSEMIDE 40 MG/1
40 TABLET ORAL
Refills: 0 | Status: DISCONTINUED | OUTPATIENT
Start: 2024-12-06 | End: 2024-12-06

## 2024-12-06 RX ADMIN — Medication 4 UNIT(S): at 12:54

## 2024-12-06 RX ADMIN — Medication 2: at 12:54

## 2024-12-06 RX ADMIN — Medication 700 UNIT(S)/HR: at 07:54

## 2024-12-06 RX ADMIN — ISOSORBIDE DINITRATE 30 MILLIGRAM(S): 40 TABLET ORAL at 05:29

## 2024-12-06 RX ADMIN — METOPROLOL TARTRATE 50 MILLIGRAM(S): 100 TABLET, FILM COATED ORAL at 05:29

## 2024-12-06 RX ADMIN — ISOSORBIDE DINITRATE 30 MILLIGRAM(S): 40 TABLET ORAL at 12:53

## 2024-12-06 RX ADMIN — RIVAROXABAN 15 MILLIGRAM(S): 10 TABLET, FILM COATED ORAL at 13:30

## 2024-12-06 RX ADMIN — FUROSEMIDE 40 MILLIGRAM(S): 40 TABLET ORAL at 13:30

## 2024-12-06 RX ADMIN — Medication 4 UNIT(S): at 08:22

## 2024-12-06 RX ADMIN — Medication 50 MILLIGRAM(S): at 05:29

## 2024-12-06 NOTE — DISCHARGE NOTE PROVIDER - CARE PROVIDERS DIRECT ADDRESSES
,[email or web address]

## 2024-12-06 NOTE — DISCHARGE NOTE PROVIDER - PROVIDER TOKENS
PROVIDER:[TOKEN:[[id number]],SCHEDULEDAPPT:[12/10/2024]],PROVIDER:[TOKEN:[[id number]],FOLLOWUP:[1 week],ESTABLISHEDPATIENT:[T]] PROVIDER:[TOKEN:[[id number]],SCHEDULEDAPPT:[12/10/2024]],PROVIDER:[TOKEN:[[id number]],FOLLOWUP:[1 week],ESTABLISHEDPATIENT:[T]],PROVIDER:[TOKEN:[[id number]],FOLLOWUP:[1 week],ESTABLISHEDPATIENT:[T]]

## 2024-12-06 NOTE — DISCHARGE NOTE NURSING/CASE MANAGEMENT/SOCIAL WORK - FINANCIAL ASSISTANCE
[facility] provides services at a reduced cost to those who are determined to be eligible through [facility]’s financial assistance program. Information regarding [facility]’s financial assistance program can be found by going to [email or web address] or by calling [phone].

## 2024-12-06 NOTE — DISCHARGE NOTE PROVIDER - NSDCFUSCHEDAPPT_GEN_ALL_CORE_FT
Tyrone Calle  [facility]  VASCULAR [address]  Scheduled Appointment: 12/10/2024    [facility]  VASCULAR [address]  Scheduled Appointment: 12/10/2024

## 2024-12-06 NOTE — DISCHARGE NOTE NURSING/CASE MANAGEMENT/SOCIAL WORK - PATIENT PORTAL LINK FT
You can access the FollowMyHealth Patient Portal offered by [facility] by registering at the following website: [email or web address]. By joining [employer]’s FollowMyHealth portal, you will also be able to view your health information using other applications (apps) compatible with our system.

## 2024-12-06 NOTE — PROGRESS NOTE ADULT - ASSESSMENT
68 y/o male with hx of CAD s/p multiple  stents, HFrEF (EF~50% as of 11/26/23), A-fib, HTN, HLD, T2DM, CKD3, former smoke a/w BLe pain found to have hypokalemia and US : There is bilateral lower extremity arterial vascular disease, more severe on the right.   The right popliteal artery is occluded.  The right posterior tibial peroneal trunk is occluded.  There is a postobstructive pattern of diminished antegrade flow through   the right trifurcation and dorsal pedis arteries.    There is a severe stenosis of the left posterior tibial artery in the   proximal calf.  There is a severe stenosis of the left peroneal artery in the proximal   calf.    - cont present meds  - cont repletion of K  - Vascular follow up re: possible intervention   - stable from CV standpoint  
68 yo male former smoker with history of HTN, HLD, DM, CKD3, CAD s/p PCI, HFrEF, afib on Xarelto, remote L CEA, and known PAD admitted for hypokalemia and BARRY on routine outpatient bloodwork. Patient notes about 3 weeks of redness on both feet but denies wounds, rest pain, or claudication in either leg. On exam he has nonpalpable distal pulses on the right but palpable DP on the left. Duplex imaging ordered in the ED notable for occlusion of the right popliteal artery and TPT but reconstitution of the tibial arteries, a progression from last US in April at which time the popliteal was occluded distally but TPT was patent. R TBI diminished, however no signs or symptoms concerning for acute limb ischemia. In addition to hypokalemia and BARRY, leukocytosis to 12; red discoloration of feet could represent superficial infection however no other obvious infectious s/s.    Recs:  - NIKHIL/PVRs noted, evidence of PAD in both legs R>L with noncompressible vessels however left NIKHIL normal, toe pressures 74/70  - Patient remains asymptomatic, denies rest pain, no active wounds, no reproducible leg pain with ambulation concerning for claudication  - Patient should follow up outpatient with Dr. Calle for further evaluation, surveillance, and possible elective outpatient angio if indicated   - Remainder of care per primary  - Please page [phone] with any additional questions/concerns    Discussed with Dr. Calle    Vascular Surgery  [id number]   
70 y/o male with hx of CAD s/p multiple  stents, HFrEF (EF~50% as of 11/26/23), A-fib, HTN, HLD, T2DM, CKD3, former smoke a/w BLe pain found to have hypokalemia and US : There is bilateral lower extremity arterial vascular disease, more severe   on the right.    The right popliteal artery is occluded.  The right posterior tibial peroneal trunk is occluded.  There is a postobstructive pattern of diminished antegrade flow through   the right trifurcation and dorsal pedis arteries.    There is a severe stenosis of the left posterior tibial artery in the   proximal calf.  There is a severe stenosis of the left peroneal artery in the proximal   calf.  pt denies any diarreah   no cp/sob  no chjnage in dosing of his diuretics     hypokalemia/magenmia : cont replacement per renal   monitor K and mag     BARRY : restart diuretics   monitor     Severe PAD :  d/w vasc   fu input         afib : hold DOAC   start heparin     CAD: fu with cardiology     DM: uncontrolled   monitor FS   hypoglycemia /hyper note d  adjust insulin         
68 y/o male with hx of CAD s/p multiple  stents, HFrEF (EF~50% as of 11/26/23), A-fib, HTN, HLD, T2DM, CKD3, former smoke a/w BLe pain found to have hypokalemia and US : There is bilateral lower extremity arterial vascular disease, more severe   on the right.    The right popliteal artery is occluded.  The right posterior tibial peroneal trunk is occluded.  There is a postobstructive pattern of diminished antegrade flow through   the right trifurcation and dorsal pedis arteries.    There is a severe stenosis of the left posterior tibial artery in the   proximal calf.  There is a severe stenosis of the left peroneal artery in the proximal   calf.  pt denies any diarreah   no cp/sob  no chjnage in dosing of his diuretics     hypokalemia/magenmia : cont replacement per renal   monitor K and mag : improved     off metolazone   moniotr as op     BARRY : restart diuretics   monitor     Severe PAD :  d/w vasc   fu wiht DR. Calle as op         afib : restarted  DOAC       CAD: fu with cardiology     DM: uncontrolled   monitor FS   hypoglycemia /hyper note d  adjust insulin per Endo         
68 y/o male with hx of CAD s/p multiple  stents, HFrEF (EF~50% as of 11/26/23), A-fib, HTN, HLD, T2DM, CKD3, former smoke a/w BLe pain found to have hypokalemia and US : There is bilateral lower extremity arterial vascular disease, more severe   on the right.    The right popliteal artery is occluded.  The right posterior tibial peroneal trunk is occluded.  There is a postobstructive pattern of diminished antegrade flow through   the right trifurcation and dorsal pedis arteries.    There is a severe stenosis of the left posterior tibial artery in the   proximal calf.  There is a severe stenosis of the left peroneal artery in the proximal   calf.  pt denies any diarreah   no cp/sob  no chjnage in dosing of his diuretics     hypokalemia/magenmia : cont replacement per renal   monitor K and mag     BARRY : hold diuresis     Severe PAD :  fu NIKHIL /PVR       afib : hold DOAC   start heparin     CAD: fu with cardiology     DM: uncontrolled   monitor FS   hypoglycemia /hyper note d  adjust insulin

## 2024-12-06 NOTE — DISCHARGE NOTE PROVIDER - CARE PROVIDER_API CALL
Tyrone Calle  Vascular Surgery  [address], [address]  [place] [address]  Phone: [phone]  Fax: [phone]  Scheduled Appointment: 12/10/2024    Noa Carl  [address], [facility] [address]  [place] [address]  Phone: [phone]  Fax: [phone]  Established Patient  Follow Up Time: 1 week   Tyrone Calle  Vascular Surgery  [address], [address]  [place] [address]  Phone: [phone]  Fax: [phone]  Scheduled Appointment: 12/10/2024    Noa Carl  [address], [address]  [place] [address]  Phone: [phone]  Fax: [phone]  Established Patient  Follow Up Time: 1 week    Alfred Moon  Cardiology  [address] [place] [address]  Phone: [phone]  Fax: [phone]  Established Patient  Follow Up Time: 1 week

## 2024-12-06 NOTE — DISCHARGE NOTE PROVIDER - HOSPITAL COURSE
HPI:  70 y/o male with hx of CAD s/p multiple  stents, HFrEF (EF~50% as of 11/26/23), A-fib, HTN, HLD, T2DM, CKD3, former smoke a/w BLe pain found to have hypokalemia and US : There is bilateral lower extremity arterial vascular disease, more severe   on the right.    The right popliteal artery is occluded.  The right posterior tibial peroneal trunk is occluded.  There is a postobstructive pattern of diminished antegrade flow through   the right trifurcation and dorsal pedis arteries.    There is a severe stenosis of the left posterior tibial artery in the   proximal calf.  There is a severe stenosis of the left peroneal artery in the proximal   calf.  pt denies any diarreah   no cp/sob  no chjnage in dosing of his diuretics        (03 Dec 2024 12:00)    Hospital Course: **********      Important Medication Changes and Reason: **********  - STOP metolazone    Active or Pending Issues Requiring Follow-up:  - PCP  - Vascular Surgery    Advanced Directives:   [X] Full code  [ ] DNR  [ ] Hospice    Discharge Diagnoses:  Hypokalemia  BARRY on CKD  PAD  HFrEF  Afib         HPI:  70 y/o male with hx of CAD s/p multiple  stents, HFrEF (EF~50% as of 11/26/23), A-fib, HTN, HLD, T2DM, CKD3, former smoke a/w BLe pain found to have hypokalemia and US : There is bilateral lower extremity arterial vascular disease, more severe   on the right.    The right popliteal artery is occluded.  The right posterior tibial peroneal trunk is occluded.  There is a postobstructive pattern of diminished antegrade flow through   the right trifurcation and dorsal pedis arteries.    There is a severe stenosis of the left posterior tibial artery in the   proximal calf.  There is a severe stenosis of the left peroneal artery in the proximal   calf.  pt denies any diarreah   no cp/sob  no chjnage in dosing of his diuretics        (03 Dec 2024 12:00)    Hospital Course:   K 2.6 likely dehydrated due to combination Zaroxolyn and furosemide, hold meds. Vascular consulted for B/L Feet redness - arterial doppler and NIKHIL/PVR c/w PAD - no vascular intervention. 12/4 7 beats of wide complex tachycardia overnight, pt asymptomatic. Continue to replete electrolytes. Spironolactone resumed. Follow-up outpatient with Dr Calle to discuss elective angiogram. Lasix resumed. Potassium on day of discharge is 4.5.    Discharge planning discussed with attending Dr Mcdaniel. Patient is medically cleared and stable for discharge home. Medication Reconciliation reviewed with attending.    Important Medication Changes and Reason: **********  - STOP metolazone  - START spironolactone    Active or Pending Issues Requiring Follow-up:  - PCP  - Vascular Surgery    Advanced Directives:   [X] Full code  [ ] DNR  [ ] Hospice    Discharge Diagnoses:  Hypokalemia  BARRY on CKD  PAD  HFrEF  Afib         HPI:  70 y/o male with hx of CAD s/p multiple  stents, HFrEF (EF~50% as of 11/26/23), A-fib, HTN, HLD, T2DM, CKD3, former smoke a/w BLe pain found to have hypokalemia and US : There is bilateral lower extremity arterial vascular disease, more severe   on the right.    The right popliteal artery is occluded.  The right posterior tibial peroneal trunk is occluded.  There is a postobstructive pattern of diminished antegrade flow through   the right trifurcation and dorsal pedis arteries.    There is a severe stenosis of the left posterior tibial artery in the   proximal calf.  There is a severe stenosis of the left peroneal artery in the proximal   calf.  pt denies any diarreah   no cp/sob  no chjnage in dosing of his diuretics        (03 Dec 2024 12:00)    Hospital Course:   K 2.6 likely dehydrated due to combination Zaroxolyn and furosemide, hold meds. Vascular consulted for B/L Feet redness - arterial doppler and NIKHIL/PVR c/w PAD - no vascular intervention. 12/4 7 beats of wide complex tachycardia overnight, pt asymptomatic. Continue to replete electrolytes. Spironolactone resumed. Follow-up outpatient with Dr Calle to discuss elective angiogram. Lasix resumed. Potassium on day of discharge is 4.5.    Discharge planning discussed with attending Dr Mcdaniel. Patient is medically cleared and stable for discharge home. Medication Reconciliation reviewed with attending.    Important Medication Changes and Reason:  - STOP metolazone  - START spironolactone    Active or Pending Issues Requiring Follow-up:  - PCP  - Vascular Surgery    Advanced Directives:   [X] Full code  [ ] DNR  [ ] Hospice    Discharge Diagnoses:  Hypokalemia  BARRY on CKD  PAD  HFrEF  Afib

## 2024-12-06 NOTE — DISCHARGE NOTE PROVIDER - NSDCMRMEDTOKEN_GEN_ALL_CORE_FT
acetaminophen 500 mg oral tablet: 2 tab(s) orally 2 times a day as needed  clopidogrel 75 mg oral tablet: 1 tab(s) orally once a day  dapagliflozin 10 mg oral tablet: 1 tab(s) orally once a day  furosemide 40 mg oral tablet: 1 tab(s) orally 2 times a day  insulin glargine 100 units/mL subcutaneous solution: 24 unit(s) subcutaneous once a day (at bedtime)  insulin lispro 100 units/mL injectable solution: 16 unit(s) subcutaneous 3 times a day (before meals)  isosorbide dinitrate 30 mg oral tablet: 1 tab(s) orally 3 times a day  metOLazone 5 mg oral tablet: 1 tab(s) orally once a day  metoprolol succinate 50 mg oral tablet, extended release: 1 tab(s) orally 2 times a day  potassium chloride 20 mEq oral tablet, extended release: 1 tab(s) orally once a day  Xarelto 15 mg oral tablet: 1 tab(s) orally once a day   acetaminophen 500 mg oral tablet: 2 tab(s) orally 2 times a day as needed  clopidogrel 75 mg oral tablet: 1 tab(s) orally once a day  dapagliflozin 10 mg oral tablet: 1 tab(s) orally once a day  furosemide 40 mg oral tablet: 1 tab(s) orally 2 times a day  insulin glargine 100 units/mL subcutaneous solution: 24 unit(s) subcutaneous once a day (at bedtime)  insulin lispro 100 units/mL injectable solution: 16 unit(s) subcutaneous 3 times a day (before meals)  isosorbide dinitrate 30 mg oral tablet: 1 tab(s) orally 3 times a day  metoprolol succinate 50 mg oral tablet, extended release: 1 tab(s) orally once a day  potassium chloride 20 mEq oral tablet, extended release: 1 tab(s) orally once a day  Xarelto 15 mg oral tablet: 1 tab(s) orally once a day   acetaminophen 500 mg oral tablet: 2 tab(s) orally 2 times a day as needed  clopidogrel 75 mg oral tablet: 1 tab(s) orally once a day  dapagliflozin 10 mg oral tablet: 1 tab(s) orally once a day  furosemide 40 mg oral tablet: 1 tab(s) orally 2 times a day  insulin glargine 100 units/mL subcutaneous solution: 24 unit(s) subcutaneous once a day (at bedtime)  insulin lispro 100 units/mL injectable solution: 16 unit(s) subcutaneous 3 times a day (before meals)  isosorbide dinitrate 30 mg oral tablet: 1 tab(s) orally 3 times a day  metoprolol succinate 50 mg oral tablet, extended release: 1 tab(s) orally once a day  potassium chloride 20 mEq oral tablet, extended release: 1 tab(s) orally once a day  spironolactone 25 mg oral tablet: 2 tab(s) orally once a day  Xarelto 15 mg oral tablet: 1 tab(s) orally once a day

## 2024-12-06 NOTE — PROGRESS NOTE ADULT - PROVIDER SPECIALTY LIST ADULT
Internal Medicine
Internal Medicine
Nephrology
Cardiology
Vascular Surgery
Internal Medicine

## 2024-12-06 NOTE — DISCHARGE NOTE NURSING/CASE MANAGEMENT/SOCIAL WORK - NSDCPEFALRISK_GEN_ALL_CORE
For information on Fall & Injury Prevention, visit: [email or web address] OR  [email or web address] OR  https://www.cdc.gov/steadi/patient.html

## 2024-12-06 NOTE — DISCHARGE NOTE NURSING/CASE MANAGEMENT/SOCIAL WORK - NSDCFUADDAPPT_GEN_ALL_CORE_FT
APPTS ARE READY TO BE MADE: [X] YES    Best Family or Patient Contact (if needed):    Additional Information about above appointments (if needed):    1:   2:   3:     Other comments or requests:    V-Y Flap Text: The defect edges were debeveled with a #15 scalpel blade.  Given the location of the defect, shape of the defect and the proximity to free margins a V-Y flap was deemed most appropriate.  Using a sterile surgical marker, an appropriate advancement flap was drawn incorporating the defect and placing the expected incisions within the relaxed skin tension lines where possible.    The area thus outlined was incised deep to adipose tissue with a #15 scalpel blade.  The skin margins were undermined to an appropriate distance in all directions utilizing iris scissors.

## 2024-12-06 NOTE — DISCHARGE NOTE PROVIDER - NSDCCPCAREPLAN_GEN_ALL_CORE_FT
PRINCIPAL DISCHARGE DIAGNOSIS  Diagnosis: Hypokalemia  Assessment and Plan of Treatment: Low potassium likely secondary to dehydration from medications metolazone and lasix. Medications held and potassium repleted. Do not resume metolazone. Please follow-up with your primary care physician within one week of discharge.      SECONDARY DISCHARGE DIAGNOSES  Diagnosis: Renal insufficiency, mild  Assessment and Plan of Treatment: Creatinine monitored. Improved.    Diagnosis: PAD (peripheral artery disease)  Assessment and Plan of Treatment: Arterial dopplers and NIKHIL/PVR consistent with peripheral artery disease. Follow-up with Dr Calle on 12/10/24 to discuss a possible elective angiogram.    Diagnosis: Chronic atrial fibrillation  Assessment and Plan of Treatment: Continue xarelto    Diagnosis: Heart failure with reduced ejection fraction  Assessment and Plan of Treatment: HOLD metolazone  Continue home spironolactone and home lasix     PRINCIPAL DISCHARGE DIAGNOSIS  Diagnosis: Hypokalemia  Assessment and Plan of Treatment: Low potassium likely secondary to dehydration from medications metolazone and lasix. Medications held and potassium repleted. Do not resume metolazone. Please continue to take potassium chloride 20 mEq daily. Please follow-up with your primary care physician within one week of discharge. Please follow-up with cardiology Dr Moon within 1 week of discharge.      SECONDARY DISCHARGE DIAGNOSES  Diagnosis: Renal insufficiency, mild  Assessment and Plan of Treatment: Creatinine monitored. Improved. Please have a repeat basic metabolic panel lab tested in 1 week.    Diagnosis: PAD (peripheral artery disease)  Assessment and Plan of Treatment: Arterial dopplers and NIKHIL/PVR consistent with peripheral artery disease. Follow-up with Dr Calle on 12/10/24 to discuss a possible elective angiogram.    Diagnosis: Chronic atrial fibrillation  Assessment and Plan of Treatment: Continue xarelto    Diagnosis: Heart failure with reduced ejection fraction  Assessment and Plan of Treatment: HOLD metolazone  Continue home spironolactone and home lasix

## 2024-12-06 NOTE — PROGRESS NOTE ADULT - SUBJECTIVE AND OBJECTIVE BOX
Overnight events noted      VITAL:  T(C): , Max: 36.6 (12-05-24 @ 20:49)  T(F): , Max: 97.8 (12-05-24 @ 20:49)  HR: 64 (12-06-24 @ 04:59)  BP: 147/67 (12-06-24 @ 04:59)  BP(mean): --  RR: 18 (12-06-24 @ 04:59)  SpO2: 97% (12-06-24 @ 04:59)  Wt(kg): --      PHYSICAL EXAM:  Constitutional: NAD, Alert  HEENT: NCAT, DMM  Neck: Supple, No JVD  Respiratory: CTA-b/l  Cardiovascular: RRR s1s2, no m/r/g  Gastrointestinal: BS+, soft, NT/ND  Extremities: No peripheral edema b/l  Neurological: no focal deficits; strength grossly intact  Back: no CVAT b/l  Skin: (+)erythema of dorsum of right foot    LABS:                        15.3   11.44 )-----------( 267      ( 06 Dec 2024 06:15 )             44.4     Na(137)/K(4.5)/Cl(93)/HCO3(28)/BUN(76)/Cr(2.10)Glu(197)/Ca(10.6)/Mg(2.9)/PO4(--)    12-06 @ 10:13  Na(--)/K(4.8)/Cl(--)/HCO3(--)/BUN(--)/Cr(--)Glu(--)/Ca(--)/Mg(--)/PO4(--)    12-05 @ 21:02  Na(139)/K(3.0)/Cl(93)/HCO3(32)/BUN(81)/Cr(2.21)Glu(92)/Ca(10.0)/Mg(3.2)/PO4(--)    12-05 @ 06:21  Na(--)/K(3.4)/Cl(--)/HCO3(--)/BUN(--)/Cr(--)Glu(--)/Ca(--)/Mg(3.2)/PO4(--)    12-04 @ 23:55  Na(141)/K(2.9)/Cl(92)/HCO3(33)/BUN(84)/Cr(2.38)Glu(39)/Ca(10.4)/Mg(3.7)/PO4(3.4)    12-04 @ 06:42  Na(132)/K(3.8)/Cl(89)/HCO3(29)/BUN(89)/Cr(2.41)Glu(441)/Ca(9.4)/Mg(--)/PO4(--)    12-03 @ 18:27          IMPRESSION: 69M w/ HTN, DM2, CAD, AFIb, HFrEF, and CKD4, 12/3/24 p/w hypokalemia    (1)Renal- CKD - nonproteinuric stage 4 - due to hypertension/atherosclerotic disease +/- component from diabetic nephropathy. Near baseline.    (2)Hypokalemia - now resolved    (3)Vasc - Vascular input appreciated - no need for inpatient intervention, can f/u as outpatient with Dr. Calle    (4)CV - acceptable BP/volume - back on Lasix as taken at home      RECOMMEND:  (1)Lasix + Spironolactone as ordered  (2)D/C planning per primary team; repeat BMP within 1 week; can f/u at my office 1/23/25 as previously planned            Markell Walton MD  [facility]  Office/on call physician: [phone]  Cell (7a-7p): [phone]       no pain, no sob      VITAL:  T(C): , Max: 36.6 (12-05-24 @ 20:49)  T(F): , Max: 97.8 (12-05-24 @ 20:49)  HR: 64 (12-06-24 @ 04:59)  BP: 147/67 (12-06-24 @ 04:59)  BP(mean): --  RR: 18 (12-06-24 @ 04:59)  SpO2: 97% (12-06-24 @ 04:59)  Wt(kg): --      PHYSICAL EXAM:  Constitutional: NAD, Alert  HEENT: NCAT, DMM  Neck: Supple, No JVD  Respiratory: CTA-b/l  Cardiovascular: RRR s1s2, no m/r/g  Gastrointestinal: BS+, soft, NT/ND  Extremities: No peripheral edema b/l  Neurological: no focal deficits; strength grossly intact  Back: no CVAT b/l  Skin: (+)erythema of dorsum of right foot    LABS:                        15.3   11.44 )-----------( 267      ( 06 Dec 2024 06:15 )             44.4     Na(137)/K(4.5)/Cl(93)/HCO3(28)/BUN(76)/Cr(2.10)Glu(197)/Ca(10.6)/Mg(2.9)/PO4(--)    12-06 @ 10:13  Na(--)/K(4.8)/Cl(--)/HCO3(--)/BUN(--)/Cr(--)Glu(--)/Ca(--)/Mg(--)/PO4(--)    12-05 @ 21:02  Na(139)/K(3.0)/Cl(93)/HCO3(32)/BUN(81)/Cr(2.21)Glu(92)/Ca(10.0)/Mg(3.2)/PO4(--)    12-05 @ 06:21  Na(--)/K(3.4)/Cl(--)/HCO3(--)/BUN(--)/Cr(--)Glu(--)/Ca(--)/Mg(3.2)/PO4(--)    12-04 @ 23:55  Na(141)/K(2.9)/Cl(92)/HCO3(33)/BUN(84)/Cr(2.38)Glu(39)/Ca(10.4)/Mg(3.7)/PO4(3.4)    12-04 @ 06:42  Na(132)/K(3.8)/Cl(89)/HCO3(29)/BUN(89)/Cr(2.41)Glu(441)/Ca(9.4)/Mg(--)/PO4(--)    12-03 @ 18:27          IMPRESSION: 69M w/ HTN, DM2, CAD, AFIb, HFrEF, and CKD4, 12/3/24 p/w hypokalemia    (1)Renal- CKD - nonproteinuric stage 4 - due to hypertension/atherosclerotic disease +/- component from diabetic nephropathy. Near baseline.    (2)Hypokalemia - now resolved    (3)Vasc - Vascular input appreciated - no need for inpatient intervention, can f/u as outpatient with Dr. Calle    (4)CV - acceptable BP/volume - back on Lasix as taken at home      RECOMMEND:  (1)Lasix + Spironolactone as ordered  (2)D/C planning per primary team; repeat BMP within 1 week; can f/u at my office 1/23/25 as previously planned            Markell Walton MD  [facility]  Office/on call physician: [phone]  Cell (7a-7p): [phone]

## 2024-12-06 NOTE — PROGRESS NOTE ADULT - SUBJECTIVE AND OBJECTIVE BOX
Date of service: 24 @ 13:59      Patient is a 69y old  Male who presents with a chief complaint of hypo  K+ (06 Dec 2024 11:49)                                                               INTERVAL HPI/OVERNIGHT EVENTS:    REVIEW OF SYSTEMS:     CONSTITUTIONAL: No weakness, fevers or chills  EYES/ENT: No visual changes , no ear ache   NECK: No pain or stiffness  RESPIRATORY: No cough, wheezing,  No shortness of breath  CARDIOVASCULAR: No chest pain or palpitations  GASTROINTESTINAL: No abdominal pain  . No nausea, vomiting, or hematemesis; No diarrhea or constipation. No melena or hematochezia.  GENITOURINARY: No dysuria, frequency or hematuria  NEUROLOGICAL: No numbness or weakness  SKIN: No itching, burning, rashes, or lesions                                                                                                                                                                                                                                                                                 Medications:  MEDICATIONS  (STANDING):  dextrose 5%. 1000 milliLiter(s) (100 mL/Hr) IV Continuous <Continuous>  dextrose 5%. 1000 milliLiter(s) (50 mL/Hr) IV Continuous <Continuous>  dextrose 50% Injectable 25 Gram(s) IV Push once  dextrose 50% Injectable 12.5 Gram(s) IV Push once  dextrose 50% Injectable 25 Gram(s) IV Push once  furosemide    Tablet 40 milliGRAM(s) Oral two times a day  glucagon  Injectable 1 milliGRAM(s) IntraMuscular once  insulin glargine Injectable (LANTUS) 15 Unit(s) SubCutaneous at bedtime  insulin lispro (ADMELOG) corrective regimen sliding scale   SubCutaneous three times a day before meals  insulin lispro (ADMELOG) corrective regimen sliding scale   SubCutaneous at bedtime  insulin lispro Injectable (ADMELOG) 4 Unit(s) SubCutaneous three times a day before meals  isosorbide   dinitrate Tablet (ISORDIL) 30 milliGRAM(s) Oral three times a day  metoprolol succinate ER 50 milliGRAM(s) Oral daily  rivaroxaban 15 milliGRAM(s) Oral with dinner  spironolactone 50 milliGRAM(s) Oral daily    MEDICATIONS  (PRN):  acetaminophen     Tablet .. 650 milliGRAM(s) Oral every 6 hours PRN Mild Pain (1 - 3), Moderate Pain (4 - 6)  dextrose Oral Gel 15 Gram(s) Oral once PRN Blood Glucose LESS THAN 70 milliGRAM(s)/deciliter       Allergies    atorvastatin (Muscle Pain (Severe))    Intolerances      Vital Signs Last 24 Hrs  T(C): 36.7 (06 Dec 2024 12:05), Max: 36.7 (06 Dec 2024 12:05)  T(F): 98 (06 Dec 2024 12:05), Max: 98 (06 Dec 2024 12:05)  HR: 83 (06 Dec 2024 12:05) (64 - 83)  BP: 175/99 (06 Dec 2024 12:05) (147/67 - 175/99)  BP(mean): --  RR: 18 (06 Dec 2024 12:05) (18 - 18)  SpO2: 98% (06 Dec 2024 12:05) (97% - 98%)    Parameters below as of 06 Dec 2024 12:  Patient On (Oxygen Delivery Method): room air      CAPILLARY BLOOD GLUCOSE      POCT Blood Glucose.: 241 mg/dL (06 Dec 2024 12:10)  POCT Blood Glucose.: 148 mg/dL (06 Dec 2024 07:55)  POCT Blood Glucose.: 341 mg/dL (05 Dec 2024 21:27)  POCT Blood Glucose.: 217 mg/dL (05 Dec 2024 16:40)       @ 07:  -   @ 07:00  --------------------------------------------------------  IN: 284 mL / OUT: 2950 mL / NET: -2666 mL     @ 07:  -   @ 13:59  --------------------------------------------------------  IN: 480 mL / OUT: 600 mL / NET: -120 mL      Physical Exam:    Daily     Daily Weight in k.9 (05 Dec 2024 14:45)  General:  Well appearing, NAD, not cachetic  HEENT:  Nonicteric, PERRLA  CV:  RRR, S1S2   Lungs:  CTA B/L, no wheezes, rales, rhonchi  Abdomen:  Soft, non-tender, no distended, positive BS  Extremities:  no edema                                                                                                                                                                                                                                                                                          LABS:                               15.3   11.44 )-----------( 267      ( 06 Dec 2024 06:15 )             44.4                      12    137  |  93[L]  |  76[H]  ----------------------------<  197[H]  4.5   |  28  |  2.10[H]    Ca    10.6[H]      06 Dec 2024 10:13  Mg     2.9                              RADIOLOGY & ADDITIONAL TESTS         I personally reviewed: [  ]EKG   [  ]CXR    [  ] CT      A/P:         Discussed with :     Derek consultants' Notes   Time spent :

## 2024-12-06 NOTE — PROGRESS NOTE ADULT - SUBJECTIVE AND OBJECTIVE BOX
No dyspnea  resting comfortably  /67  HR 64  Lungs clear  Irregular rhythm 1/6 systolic murmur  No edema    BUN 76  Crt 2.10  K+ 4.5    Imp:  Renal function is improved but not completely back to baseline   K+ has been repleted  OK to resume Lasix 40 mg PO bid   Maintain off Metolazone   Continue Spironolactone  Resume KCL 20 mEq qd  Stable for DC to home from a CV stand point  Strongly advised to see Dr Moon (Cardio) within 1 week

## 2024-12-07 NOTE — PATIENT PROFILE ADULT - FUNCTIONAL ASSESSMENT - DAILY ACTIVITY 3.
[Healthy Appearing] : healthy appearing [Well Nourished] : well nourished [Interactive] : interactive 4 = No assist / stand by assistance

## 2024-12-10 ENCOUNTER — APPOINTMENT (OUTPATIENT)
Dept: VASCULAR SURGERY | Facility: CLINIC | Age: 69
End: 2024-12-10
Payer: MEDICARE

## 2024-12-10 VITALS
SYSTOLIC BLOOD PRESSURE: 128 MMHG | DIASTOLIC BLOOD PRESSURE: 78 MMHG | BODY MASS INDEX: 20.89 KG/M2 | TEMPERATURE: 97.6 F | HEIGHT: 66 IN | HEART RATE: 109 BPM | WEIGHT: 130 LBS

## 2024-12-10 DIAGNOSIS — Z78.9 OTHER SPECIFIED HEALTH STATUS: ICD-10-CM

## 2024-12-10 DIAGNOSIS — I65.23 OCCLUSION AND STENOSIS OF BILATERAL CAROTID ARTERIES: ICD-10-CM

## 2024-12-10 DIAGNOSIS — I73.9 PERIPHERAL VASCULAR DISEASE, UNSPECIFIED: ICD-10-CM

## 2024-12-10 DIAGNOSIS — I77.1 STRICTURE OF ARTERY: ICD-10-CM

## 2024-12-10 PROCEDURE — 93923 UPR/LXTR ART STDY 3+ LVLS: CPT

## 2024-12-10 PROCEDURE — ZZZZZ: CPT

## 2024-12-10 PROCEDURE — 93880 EXTRACRANIAL BILAT STUDY: CPT

## 2024-12-10 PROCEDURE — 99214 OFFICE O/P EST MOD 30 MIN: CPT

## 2025-01-01 ENCOUNTER — APPOINTMENT (OUTPATIENT)
Dept: VASCULAR SURGERY | Facility: CLINIC | Age: 70
End: 2025-01-01

## 2025-01-01 ENCOUNTER — INPATIENT (INPATIENT)
Facility: HOSPITAL | Age: 70
LOS: 19 days | DRG: 556 | End: 2025-04-15
Attending: SPECIALIST | Admitting: GENERAL ACUTE CARE HOSPITAL
Payer: MEDICARE

## 2025-01-01 VITALS
HEART RATE: 77 BPM | TEMPERATURE: 98 F | SYSTOLIC BLOOD PRESSURE: 98 MMHG | RESPIRATION RATE: 18 BRPM | DIASTOLIC BLOOD PRESSURE: 75 MMHG | OXYGEN SATURATION: 94 %

## 2025-01-01 VITALS
TEMPERATURE: 98 F | HEIGHT: 66 IN | HEART RATE: 87 BPM | WEIGHT: 179.9 LBS | DIASTOLIC BLOOD PRESSURE: 77 MMHG | SYSTOLIC BLOOD PRESSURE: 134 MMHG | RESPIRATION RATE: 20 BRPM | OXYGEN SATURATION: 94 %

## 2025-01-01 DIAGNOSIS — E11.9 TYPE 2 DIABETES MELLITUS WITHOUT COMPLICATIONS: ICD-10-CM

## 2025-01-01 DIAGNOSIS — I48.91 UNSPECIFIED ATRIAL FIBRILLATION: ICD-10-CM

## 2025-01-01 DIAGNOSIS — N18.2 CHRONIC KIDNEY DISEASE, STAGE 2 (MILD): ICD-10-CM

## 2025-01-01 DIAGNOSIS — S91.302A UNSPECIFIED OPEN WOUND, LEFT FOOT, INITIAL ENCOUNTER: ICD-10-CM

## 2025-01-01 DIAGNOSIS — Z29.9 ENCOUNTER FOR PROPHYLACTIC MEASURES, UNSPECIFIED: ICD-10-CM

## 2025-01-01 DIAGNOSIS — N18.4 CHRONIC KIDNEY DISEASE, STAGE 4 (SEVERE): ICD-10-CM

## 2025-01-01 DIAGNOSIS — I77.1 STRICTURE OF ARTERY: ICD-10-CM

## 2025-01-01 DIAGNOSIS — I50.9 HEART FAILURE, UNSPECIFIED: ICD-10-CM

## 2025-01-01 DIAGNOSIS — M79.89 OTHER SPECIFIED SOFT TISSUE DISORDERS: ICD-10-CM

## 2025-01-01 LAB
A1C WITH ESTIMATED AVERAGE GLUCOSE RESULT: 8.6 % — HIGH (ref 4–5.6)
ALBUMIN SERPL ELPH-MCNC: 3.9 G/DL — SIGNIFICANT CHANGE UP (ref 3.3–5)
ALBUMIN SERPL ELPH-MCNC: 3.9 G/DL — SIGNIFICANT CHANGE UP (ref 3.3–5)
ALBUMIN SERPL ELPH-MCNC: 4 G/DL — SIGNIFICANT CHANGE UP (ref 3.3–5)
ALP SERPL-CCNC: 114 U/L — SIGNIFICANT CHANGE UP (ref 40–120)
ALP SERPL-CCNC: 134 U/L — HIGH (ref 40–120)
ALP SERPL-CCNC: 141 U/L — HIGH (ref 40–120)
ALP SERPL-CCNC: 144 U/L — HIGH (ref 40–120)
ALP SERPL-CCNC: 175 U/L — HIGH (ref 40–120)
ALP SERPL-CCNC: 181 U/L — HIGH (ref 40–120)
ALT FLD-CCNC: 14 U/L — SIGNIFICANT CHANGE UP (ref 10–45)
ALT FLD-CCNC: 15 U/L — SIGNIFICANT CHANGE UP (ref 10–45)
ALT FLD-CCNC: 16 U/L — SIGNIFICANT CHANGE UP (ref 10–45)
ALT FLD-CCNC: 16 U/L — SIGNIFICANT CHANGE UP (ref 10–45)
ALT FLD-CCNC: 17 U/L — SIGNIFICANT CHANGE UP (ref 10–45)
ALT FLD-CCNC: 21 U/L — SIGNIFICANT CHANGE UP (ref 10–45)
AMMONIA BLD-MCNC: 34 UMOL/L — SIGNIFICANT CHANGE UP (ref 11–55)
ANION GAP SERPL CALC-SCNC: 13 MMOL/L — SIGNIFICANT CHANGE UP (ref 5–17)
ANION GAP SERPL CALC-SCNC: 14 MMOL/L — SIGNIFICANT CHANGE UP (ref 5–17)
ANION GAP SERPL CALC-SCNC: 14 MMOL/L — SIGNIFICANT CHANGE UP (ref 5–17)
ANION GAP SERPL CALC-SCNC: 15 MMOL/L — SIGNIFICANT CHANGE UP (ref 5–17)
ANION GAP SERPL CALC-SCNC: 15 MMOL/L — SIGNIFICANT CHANGE UP (ref 5–17)
ANION GAP SERPL CALC-SCNC: 16 MMOL/L — SIGNIFICANT CHANGE UP (ref 5–17)
ANION GAP SERPL CALC-SCNC: 17 MMOL/L — SIGNIFICANT CHANGE UP (ref 5–17)
ANION GAP SERPL CALC-SCNC: 18 MMOL/L — HIGH (ref 5–17)
ANION GAP SERPL CALC-SCNC: 19 MMOL/L — HIGH (ref 5–17)
ANION GAP SERPL CALC-SCNC: 20 MMOL/L — HIGH (ref 5–17)
ANION GAP SERPL CALC-SCNC: 21 MMOL/L — HIGH (ref 5–17)
ANION GAP SERPL CALC-SCNC: 22 MMOL/L — HIGH (ref 5–17)
ANION GAP SERPL CALC-SCNC: 25 MMOL/L — HIGH (ref 5–17)
ANION GAP SERPL CALC-SCNC: 25 MMOL/L — HIGH (ref 5–17)
ANION GAP SERPL CALC-SCNC: 27 MMOL/L — HIGH (ref 5–17)
ANION GAP SERPL CALC-SCNC: 28 MMOL/L — HIGH (ref 5–17)
ANION GAP SERPL CALC-SCNC: 42 MMOL/L — HIGH (ref 5–17)
ANISOCYTOSIS BLD QL: SLIGHT — SIGNIFICANT CHANGE UP
APPEARANCE UR: CLEAR — SIGNIFICANT CHANGE UP
APTT BLD: 52.8 SEC — HIGH (ref 24.5–35.6)
AST SERPL-CCNC: 20 U/L — SIGNIFICANT CHANGE UP (ref 10–40)
AST SERPL-CCNC: 23 U/L — SIGNIFICANT CHANGE UP (ref 10–40)
AST SERPL-CCNC: 24 U/L — SIGNIFICANT CHANGE UP (ref 10–40)
AST SERPL-CCNC: 25 U/L — SIGNIFICANT CHANGE UP (ref 10–40)
AST SERPL-CCNC: 29 U/L — SIGNIFICANT CHANGE UP (ref 10–40)
AST SERPL-CCNC: 30 U/L — SIGNIFICANT CHANGE UP (ref 10–40)
BASOPHILS # BLD AUTO: 0 K/UL — SIGNIFICANT CHANGE UP (ref 0–0.2)
BASOPHILS # BLD AUTO: 0.04 K/UL — SIGNIFICANT CHANGE UP (ref 0–0.2)
BASOPHILS # BLD AUTO: 0.08 K/UL — SIGNIFICANT CHANGE UP (ref 0–0.2)
BASOPHILS NFR BLD AUTO: 0 % — SIGNIFICANT CHANGE UP (ref 0–2)
BASOPHILS NFR BLD AUTO: 0.5 % — SIGNIFICANT CHANGE UP (ref 0–2)
BASOPHILS NFR BLD AUTO: 0.8 % — SIGNIFICANT CHANGE UP (ref 0–2)
BILIRUB SERPL-MCNC: 1 MG/DL — SIGNIFICANT CHANGE UP (ref 0.2–1.2)
BILIRUB SERPL-MCNC: 1.4 MG/DL — HIGH (ref 0.2–1.2)
BILIRUB SERPL-MCNC: 1.5 MG/DL — HIGH (ref 0.2–1.2)
BILIRUB SERPL-MCNC: 1.6 MG/DL — HIGH (ref 0.2–1.2)
BILIRUB SERPL-MCNC: 1.7 MG/DL — HIGH (ref 0.2–1.2)
BILIRUB SERPL-MCNC: 1.7 MG/DL — HIGH (ref 0.2–1.2)
BILIRUB UR-MCNC: NEGATIVE — SIGNIFICANT CHANGE UP
BLD GP AB SCN SERPL QL: NEGATIVE — SIGNIFICANT CHANGE UP
BUN SERPL-MCNC: 100 MG/DL — HIGH (ref 7–23)
BUN SERPL-MCNC: 100 MG/DL — HIGH (ref 7–23)
BUN SERPL-MCNC: 101 MG/DL — HIGH (ref 7–23)
BUN SERPL-MCNC: 121 MG/DL — HIGH (ref 7–23)
BUN SERPL-MCNC: 129 MG/DL — HIGH (ref 7–23)
BUN SERPL-MCNC: 135 MG/DL — HIGH (ref 7–23)
BUN SERPL-MCNC: 137 MG/DL — HIGH (ref 7–23)
BUN SERPL-MCNC: 140 MG/DL — HIGH (ref 7–23)
BUN SERPL-MCNC: 44 MG/DL — HIGH (ref 7–23)
BUN SERPL-MCNC: 51 MG/DL — HIGH (ref 7–23)
BUN SERPL-MCNC: 61 MG/DL — HIGH (ref 7–23)
BUN SERPL-MCNC: 70 MG/DL — HIGH (ref 7–23)
BUN SERPL-MCNC: 71 MG/DL — HIGH (ref 7–23)
BUN SERPL-MCNC: 72 MG/DL — HIGH (ref 7–23)
BUN SERPL-MCNC: 73 MG/DL — HIGH (ref 7–23)
BUN SERPL-MCNC: 74 MG/DL — HIGH (ref 7–23)
BUN SERPL-MCNC: 75 MG/DL — HIGH (ref 7–23)
BUN SERPL-MCNC: 76 MG/DL — HIGH (ref 7–23)
BUN SERPL-MCNC: 76 MG/DL — HIGH (ref 7–23)
BUN SERPL-MCNC: 77 MG/DL — HIGH (ref 7–23)
BUN SERPL-MCNC: 79 MG/DL — HIGH (ref 7–23)
BUN SERPL-MCNC: 79 MG/DL — HIGH (ref 7–23)
BUN SERPL-MCNC: 80 MG/DL — HIGH (ref 7–23)
BUN SERPL-MCNC: 81 MG/DL — HIGH (ref 7–23)
BUN SERPL-MCNC: 89 MG/DL — HIGH (ref 7–23)
BUN SERPL-MCNC: 94 MG/DL — HIGH (ref 7–23)
BURR CELLS BLD QL SMEAR: PRESENT — SIGNIFICANT CHANGE UP
CALCIUM SERPL-MCNC: 10 MG/DL — SIGNIFICANT CHANGE UP (ref 8.4–10.5)
CALCIUM SERPL-MCNC: 10.3 MG/DL — SIGNIFICANT CHANGE UP (ref 8.4–10.5)
CALCIUM SERPL-MCNC: 9 MG/DL — SIGNIFICANT CHANGE UP (ref 8.4–10.5)
CALCIUM SERPL-MCNC: 9.3 MG/DL — SIGNIFICANT CHANGE UP (ref 8.4–10.5)
CALCIUM SERPL-MCNC: 9.3 MG/DL — SIGNIFICANT CHANGE UP (ref 8.4–10.5)
CALCIUM SERPL-MCNC: 9.4 MG/DL — SIGNIFICANT CHANGE UP (ref 8.4–10.5)
CALCIUM SERPL-MCNC: 9.5 MG/DL — SIGNIFICANT CHANGE UP (ref 8.4–10.5)
CALCIUM SERPL-MCNC: 9.6 MG/DL — SIGNIFICANT CHANGE UP (ref 8.4–10.5)
CALCIUM SERPL-MCNC: 9.7 MG/DL — SIGNIFICANT CHANGE UP (ref 8.4–10.5)
CALCIUM SERPL-MCNC: 9.8 MG/DL — SIGNIFICANT CHANGE UP (ref 8.4–10.5)
CALCIUM SERPL-MCNC: 9.9 MG/DL — SIGNIFICANT CHANGE UP (ref 8.4–10.5)
CHLORIDE SERPL-SCNC: 81 MMOL/L — LOW (ref 96–108)
CHLORIDE SERPL-SCNC: 83 MMOL/L — LOW (ref 96–108)
CHLORIDE SERPL-SCNC: 84 MMOL/L — LOW (ref 96–108)
CHLORIDE SERPL-SCNC: 85 MMOL/L — LOW (ref 96–108)
CHLORIDE SERPL-SCNC: 86 MMOL/L — LOW (ref 96–108)
CHLORIDE SERPL-SCNC: 87 MMOL/L — LOW (ref 96–108)
CHLORIDE SERPL-SCNC: 88 MMOL/L — LOW (ref 96–108)
CHLORIDE SERPL-SCNC: 89 MMOL/L — LOW (ref 96–108)
CHLORIDE SERPL-SCNC: 90 MMOL/L — LOW (ref 96–108)
CHLORIDE SERPL-SCNC: 91 MMOL/L — LOW (ref 96–108)
CHLORIDE SERPL-SCNC: 93 MMOL/L — LOW (ref 96–108)
CHLORIDE SERPL-SCNC: 93 MMOL/L — LOW (ref 96–108)
CHLORIDE SERPL-SCNC: 94 MMOL/L — LOW (ref 96–108)
CHLORIDE SERPL-SCNC: 94 MMOL/L — LOW (ref 96–108)
CHOLEST SERPL-MCNC: 116 MG/DL — SIGNIFICANT CHANGE UP
CK MB CFR SERPL CALC: 7.5 NG/ML — HIGH (ref 0–6.7)
CK SERPL-CCNC: 169 U/L — SIGNIFICANT CHANGE UP (ref 30–200)
CK SERPL-CCNC: 99 U/L — SIGNIFICANT CHANGE UP (ref 30–200)
CO2 SERPL-SCNC: 17 MMOL/L — LOW (ref 22–31)
CO2 SERPL-SCNC: 20 MMOL/L — LOW (ref 22–31)
CO2 SERPL-SCNC: 21 MMOL/L — LOW (ref 22–31)
CO2 SERPL-SCNC: 21 MMOL/L — LOW (ref 22–31)
CO2 SERPL-SCNC: 22 MMOL/L — SIGNIFICANT CHANGE UP (ref 22–31)
CO2 SERPL-SCNC: 22 MMOL/L — SIGNIFICANT CHANGE UP (ref 22–31)
CO2 SERPL-SCNC: 23 MMOL/L — SIGNIFICANT CHANGE UP (ref 22–31)
CO2 SERPL-SCNC: 24 MMOL/L — SIGNIFICANT CHANGE UP (ref 22–31)
CO2 SERPL-SCNC: 24 MMOL/L — SIGNIFICANT CHANGE UP (ref 22–31)
CO2 SERPL-SCNC: 25 MMOL/L — SIGNIFICANT CHANGE UP (ref 22–31)
CO2 SERPL-SCNC: 26 MMOL/L — SIGNIFICANT CHANGE UP (ref 22–31)
CO2 SERPL-SCNC: 26 MMOL/L — SIGNIFICANT CHANGE UP (ref 22–31)
CO2 SERPL-SCNC: 27 MMOL/L — SIGNIFICANT CHANGE UP (ref 22–31)
CO2 SERPL-SCNC: 28 MMOL/L — SIGNIFICANT CHANGE UP (ref 22–31)
CO2 SERPL-SCNC: 28 MMOL/L — SIGNIFICANT CHANGE UP (ref 22–31)
CO2 SERPL-SCNC: 29 MMOL/L — SIGNIFICANT CHANGE UP (ref 22–31)
CO2 SERPL-SCNC: 29 MMOL/L — SIGNIFICANT CHANGE UP (ref 22–31)
CO2 SERPL-SCNC: 30 MMOL/L — SIGNIFICANT CHANGE UP (ref 22–31)
CO2 SERPL-SCNC: <10 MMOL/L — CRITICAL LOW (ref 22–31)
COLOR SPEC: YELLOW — SIGNIFICANT CHANGE UP
CREAT ?TM UR-MCNC: 20 MG/DL — SIGNIFICANT CHANGE UP
CREAT SERPL-MCNC: 2 MG/DL — HIGH (ref 0.5–1.3)
CREAT SERPL-MCNC: 2.02 MG/DL — HIGH (ref 0.5–1.3)
CREAT SERPL-MCNC: 2.08 MG/DL — HIGH (ref 0.5–1.3)
CREAT SERPL-MCNC: 2.17 MG/DL — HIGH (ref 0.5–1.3)
CREAT SERPL-MCNC: 2.23 MG/DL — HIGH (ref 0.5–1.3)
CREAT SERPL-MCNC: 2.24 MG/DL — HIGH (ref 0.5–1.3)
CREAT SERPL-MCNC: 2.25 MG/DL — HIGH (ref 0.5–1.3)
CREAT SERPL-MCNC: 2.27 MG/DL — HIGH (ref 0.5–1.3)
CREAT SERPL-MCNC: 2.29 MG/DL — HIGH (ref 0.5–1.3)
CREAT SERPL-MCNC: 2.33 MG/DL — HIGH (ref 0.5–1.3)
CREAT SERPL-MCNC: 2.35 MG/DL — HIGH (ref 0.5–1.3)
CREAT SERPL-MCNC: 2.38 MG/DL — HIGH (ref 0.5–1.3)
CREAT SERPL-MCNC: 2.44 MG/DL — HIGH (ref 0.5–1.3)
CREAT SERPL-MCNC: 2.46 MG/DL — HIGH (ref 0.5–1.3)
CREAT SERPL-MCNC: 2.61 MG/DL — HIGH (ref 0.5–1.3)
CREAT SERPL-MCNC: 2.64 MG/DL — HIGH (ref 0.5–1.3)
CREAT SERPL-MCNC: 2.66 MG/DL — HIGH (ref 0.5–1.3)
CREAT SERPL-MCNC: 2.68 MG/DL — HIGH (ref 0.5–1.3)
CREAT SERPL-MCNC: 2.71 MG/DL — HIGH (ref 0.5–1.3)
CREAT SERPL-MCNC: 2.76 MG/DL — HIGH (ref 0.5–1.3)
CREAT SERPL-MCNC: 2.78 MG/DL — HIGH (ref 0.5–1.3)
CREAT SERPL-MCNC: 2.79 MG/DL — HIGH (ref 0.5–1.3)
CREAT SERPL-MCNC: 2.8 MG/DL — HIGH (ref 0.5–1.3)
CREAT SERPL-MCNC: 2.88 MG/DL — HIGH (ref 0.5–1.3)
CREAT SERPL-MCNC: 3.17 MG/DL — HIGH (ref 0.5–1.3)
CREAT SERPL-MCNC: 3.21 MG/DL — HIGH (ref 0.5–1.3)
CREAT SERPL-MCNC: 3.57 MG/DL — HIGH (ref 0.5–1.3)
CREAT SERPL-MCNC: 3.59 MG/DL — HIGH (ref 0.5–1.3)
CREAT SERPL-MCNC: 4.02 MG/DL — HIGH (ref 0.5–1.3)
CREAT SERPL-MCNC: 4.4 MG/DL — HIGH (ref 0.5–1.3)
CREAT SERPL-MCNC: 4.73 MG/DL — HIGH (ref 0.5–1.3)
CREAT SERPL-MCNC: 4.88 MG/DL — HIGH (ref 0.5–1.3)
CREAT SERPL-MCNC: 4.9 MG/DL — HIGH (ref 0.5–1.3)
CREAT SERPL-MCNC: 5.72 MG/DL — HIGH (ref 0.5–1.3)
DIFF PNL FLD: NEGATIVE — SIGNIFICANT CHANGE UP
EGFR: 10 ML/MIN/1.73M2 — LOW
EGFR: 10 ML/MIN/1.73M2 — LOW
EGFR: 12 ML/MIN/1.73M2 — LOW
EGFR: 13 ML/MIN/1.73M2 — LOW
EGFR: 13 ML/MIN/1.73M2 — LOW
EGFR: 14 ML/MIN/1.73M2 — LOW
EGFR: 14 ML/MIN/1.73M2 — LOW
EGFR: 15 ML/MIN/1.73M2 — LOW
EGFR: 15 ML/MIN/1.73M2 — LOW
EGFR: 18 ML/MIN/1.73M2 — LOW
EGFR: 20 ML/MIN/1.73M2 — LOW
EGFR: 20 ML/MIN/1.73M2 — LOW
EGFR: 21 ML/MIN/1.73M2 — LOW
EGFR: 21 ML/MIN/1.73M2 — LOW
EGFR: 23 ML/MIN/1.73M2 — LOW
EGFR: 23 ML/MIN/1.73M2 — LOW
EGFR: 24 ML/MIN/1.73M2 — LOW
EGFR: 25 ML/MIN/1.73M2 — LOW
EGFR: 26 ML/MIN/1.73M2 — LOW
EGFR: 28 ML/MIN/1.73M2 — LOW
EGFR: 29 ML/MIN/1.73M2 — LOW
EGFR: 30 ML/MIN/1.73M2 — LOW
EGFR: 31 ML/MIN/1.73M2 — LOW
EGFR: 32 ML/MIN/1.73M2 — LOW
EGFR: 32 ML/MIN/1.73M2 — LOW
EGFR: 34 ML/MIN/1.73M2 — LOW
EGFR: 34 ML/MIN/1.73M2 — LOW
EGFR: 35 ML/MIN/1.73M2 — LOW
EGFR: 35 ML/MIN/1.73M2 — LOW
EGFR: 36 ML/MIN/1.73M2 — LOW
EGFR: 36 ML/MIN/1.73M2 — LOW
EOSINOPHIL # BLD AUTO: 0 K/UL — SIGNIFICANT CHANGE UP (ref 0–0.5)
EOSINOPHIL # BLD AUTO: 0.06 K/UL — SIGNIFICANT CHANGE UP (ref 0–0.5)
EOSINOPHIL # BLD AUTO: 0.26 K/UL — SIGNIFICANT CHANGE UP (ref 0–0.5)
EOSINOPHIL NFR BLD AUTO: 0 % — SIGNIFICANT CHANGE UP (ref 0–6)
EOSINOPHIL NFR BLD AUTO: 0.7 % — SIGNIFICANT CHANGE UP (ref 0–6)
EOSINOPHIL NFR BLD AUTO: 2.6 % — SIGNIFICANT CHANGE UP (ref 0–6)
ESTIMATED AVERAGE GLUCOSE: 200 MG/DL — HIGH (ref 68–114)
FERRITIN SERPL-MCNC: 166 NG/ML — SIGNIFICANT CHANGE UP (ref 30–400)
GAS PNL BLDA: SIGNIFICANT CHANGE UP
GAS PNL BLDV: SIGNIFICANT CHANGE UP
GAS PNL BLDV: SIGNIFICANT CHANGE UP
GLUCOSE BLDC GLUCOMTR-MCNC: 100 MG/DL — HIGH (ref 70–99)
GLUCOSE BLDC GLUCOMTR-MCNC: 101 MG/DL — HIGH (ref 70–99)
GLUCOSE BLDC GLUCOMTR-MCNC: 105 MG/DL — HIGH (ref 70–99)
GLUCOSE BLDC GLUCOMTR-MCNC: 110 MG/DL — HIGH (ref 70–99)
GLUCOSE BLDC GLUCOMTR-MCNC: 112 MG/DL — HIGH (ref 70–99)
GLUCOSE BLDC GLUCOMTR-MCNC: 112 MG/DL — HIGH (ref 70–99)
GLUCOSE BLDC GLUCOMTR-MCNC: 115 MG/DL — HIGH (ref 70–99)
GLUCOSE BLDC GLUCOMTR-MCNC: 118 MG/DL — HIGH (ref 70–99)
GLUCOSE BLDC GLUCOMTR-MCNC: 125 MG/DL — HIGH (ref 70–99)
GLUCOSE BLDC GLUCOMTR-MCNC: 125 MG/DL — HIGH (ref 70–99)
GLUCOSE BLDC GLUCOMTR-MCNC: 132 MG/DL — HIGH (ref 70–99)
GLUCOSE BLDC GLUCOMTR-MCNC: 135 MG/DL — HIGH (ref 70–99)
GLUCOSE BLDC GLUCOMTR-MCNC: 140 MG/DL — HIGH (ref 70–99)
GLUCOSE BLDC GLUCOMTR-MCNC: 141 MG/DL — HIGH (ref 70–99)
GLUCOSE BLDC GLUCOMTR-MCNC: 142 MG/DL — HIGH (ref 70–99)
GLUCOSE BLDC GLUCOMTR-MCNC: 142 MG/DL — HIGH (ref 70–99)
GLUCOSE BLDC GLUCOMTR-MCNC: 152 MG/DL — HIGH (ref 70–99)
GLUCOSE BLDC GLUCOMTR-MCNC: 154 MG/DL — HIGH (ref 70–99)
GLUCOSE BLDC GLUCOMTR-MCNC: 157 MG/DL — HIGH (ref 70–99)
GLUCOSE BLDC GLUCOMTR-MCNC: 158 MG/DL — HIGH (ref 70–99)
GLUCOSE BLDC GLUCOMTR-MCNC: 160 MG/DL — HIGH (ref 70–99)
GLUCOSE BLDC GLUCOMTR-MCNC: 160 MG/DL — HIGH (ref 70–99)
GLUCOSE BLDC GLUCOMTR-MCNC: 162 MG/DL — HIGH (ref 70–99)
GLUCOSE BLDC GLUCOMTR-MCNC: 167 MG/DL — HIGH (ref 70–99)
GLUCOSE BLDC GLUCOMTR-MCNC: 169 MG/DL — HIGH (ref 70–99)
GLUCOSE BLDC GLUCOMTR-MCNC: 169 MG/DL — HIGH (ref 70–99)
GLUCOSE BLDC GLUCOMTR-MCNC: 170 MG/DL — HIGH (ref 70–99)
GLUCOSE BLDC GLUCOMTR-MCNC: 171 MG/DL — HIGH (ref 70–99)
GLUCOSE BLDC GLUCOMTR-MCNC: 171 MG/DL — HIGH (ref 70–99)
GLUCOSE BLDC GLUCOMTR-MCNC: 184 MG/DL — HIGH (ref 70–99)
GLUCOSE BLDC GLUCOMTR-MCNC: 186 MG/DL — HIGH (ref 70–99)
GLUCOSE BLDC GLUCOMTR-MCNC: 189 MG/DL — HIGH (ref 70–99)
GLUCOSE BLDC GLUCOMTR-MCNC: 193 MG/DL — HIGH (ref 70–99)
GLUCOSE BLDC GLUCOMTR-MCNC: 194 MG/DL — HIGH (ref 70–99)
GLUCOSE BLDC GLUCOMTR-MCNC: 196 MG/DL — HIGH (ref 70–99)
GLUCOSE BLDC GLUCOMTR-MCNC: 196 MG/DL — HIGH (ref 70–99)
GLUCOSE BLDC GLUCOMTR-MCNC: 198 MG/DL — HIGH (ref 70–99)
GLUCOSE BLDC GLUCOMTR-MCNC: 201 MG/DL — HIGH (ref 70–99)
GLUCOSE BLDC GLUCOMTR-MCNC: 202 MG/DL — HIGH (ref 70–99)
GLUCOSE BLDC GLUCOMTR-MCNC: 204 MG/DL — HIGH (ref 70–99)
GLUCOSE BLDC GLUCOMTR-MCNC: 204 MG/DL — HIGH (ref 70–99)
GLUCOSE BLDC GLUCOMTR-MCNC: 205 MG/DL — HIGH (ref 70–99)
GLUCOSE BLDC GLUCOMTR-MCNC: 215 MG/DL — HIGH (ref 70–99)
GLUCOSE BLDC GLUCOMTR-MCNC: 218 MG/DL — HIGH (ref 70–99)
GLUCOSE BLDC GLUCOMTR-MCNC: 221 MG/DL — HIGH (ref 70–99)
GLUCOSE BLDC GLUCOMTR-MCNC: 222 MG/DL — HIGH (ref 70–99)
GLUCOSE BLDC GLUCOMTR-MCNC: 227 MG/DL — HIGH (ref 70–99)
GLUCOSE BLDC GLUCOMTR-MCNC: 230 MG/DL — HIGH (ref 70–99)
GLUCOSE BLDC GLUCOMTR-MCNC: 231 MG/DL — HIGH (ref 70–99)
GLUCOSE BLDC GLUCOMTR-MCNC: 233 MG/DL — HIGH (ref 70–99)
GLUCOSE BLDC GLUCOMTR-MCNC: 234 MG/DL — HIGH (ref 70–99)
GLUCOSE BLDC GLUCOMTR-MCNC: 247 MG/DL — HIGH (ref 70–99)
GLUCOSE BLDC GLUCOMTR-MCNC: 257 MG/DL — HIGH (ref 70–99)
GLUCOSE BLDC GLUCOMTR-MCNC: 260 MG/DL — HIGH (ref 70–99)
GLUCOSE BLDC GLUCOMTR-MCNC: 265 MG/DL — HIGH (ref 70–99)
GLUCOSE BLDC GLUCOMTR-MCNC: 267 MG/DL — HIGH (ref 70–99)
GLUCOSE BLDC GLUCOMTR-MCNC: 267 MG/DL — HIGH (ref 70–99)
GLUCOSE BLDC GLUCOMTR-MCNC: 268 MG/DL — HIGH (ref 70–99)
GLUCOSE BLDC GLUCOMTR-MCNC: 270 MG/DL — HIGH (ref 70–99)
GLUCOSE BLDC GLUCOMTR-MCNC: 286 MG/DL — HIGH (ref 70–99)
GLUCOSE BLDC GLUCOMTR-MCNC: 288 MG/DL — HIGH (ref 70–99)
GLUCOSE BLDC GLUCOMTR-MCNC: 291 MG/DL — HIGH (ref 70–99)
GLUCOSE BLDC GLUCOMTR-MCNC: 293 MG/DL — HIGH (ref 70–99)
GLUCOSE BLDC GLUCOMTR-MCNC: 293 MG/DL — HIGH (ref 70–99)
GLUCOSE BLDC GLUCOMTR-MCNC: 294 MG/DL — HIGH (ref 70–99)
GLUCOSE BLDC GLUCOMTR-MCNC: 318 MG/DL — HIGH (ref 70–99)
GLUCOSE BLDC GLUCOMTR-MCNC: 321 MG/DL — HIGH (ref 70–99)
GLUCOSE BLDC GLUCOMTR-MCNC: 321 MG/DL — HIGH (ref 70–99)
GLUCOSE BLDC GLUCOMTR-MCNC: 345 MG/DL — HIGH (ref 70–99)
GLUCOSE BLDC GLUCOMTR-MCNC: 346 MG/DL — HIGH (ref 70–99)
GLUCOSE BLDC GLUCOMTR-MCNC: 349 MG/DL — HIGH (ref 70–99)
GLUCOSE BLDC GLUCOMTR-MCNC: 361 MG/DL — HIGH (ref 70–99)
GLUCOSE BLDC GLUCOMTR-MCNC: 366 MG/DL — HIGH (ref 70–99)
GLUCOSE BLDC GLUCOMTR-MCNC: 374 MG/DL — HIGH (ref 70–99)
GLUCOSE BLDC GLUCOMTR-MCNC: 375 MG/DL — HIGH (ref 70–99)
GLUCOSE BLDC GLUCOMTR-MCNC: 375 MG/DL — HIGH (ref 70–99)
GLUCOSE BLDC GLUCOMTR-MCNC: 429 MG/DL — HIGH (ref 70–99)
GLUCOSE BLDC GLUCOMTR-MCNC: 462 MG/DL — CRITICAL HIGH (ref 70–99)
GLUCOSE BLDC GLUCOMTR-MCNC: 64 MG/DL — LOW (ref 70–99)
GLUCOSE BLDC GLUCOMTR-MCNC: 70 MG/DL — SIGNIFICANT CHANGE UP (ref 70–99)
GLUCOSE BLDC GLUCOMTR-MCNC: 71 MG/DL — SIGNIFICANT CHANGE UP (ref 70–99)
GLUCOSE BLDC GLUCOMTR-MCNC: 72 MG/DL — SIGNIFICANT CHANGE UP (ref 70–99)
GLUCOSE BLDC GLUCOMTR-MCNC: 73 MG/DL — SIGNIFICANT CHANGE UP (ref 70–99)
GLUCOSE BLDC GLUCOMTR-MCNC: 73 MG/DL — SIGNIFICANT CHANGE UP (ref 70–99)
GLUCOSE BLDC GLUCOMTR-MCNC: 79 MG/DL — SIGNIFICANT CHANGE UP (ref 70–99)
GLUCOSE BLDC GLUCOMTR-MCNC: 81 MG/DL — SIGNIFICANT CHANGE UP (ref 70–99)
GLUCOSE BLDC GLUCOMTR-MCNC: 84 MG/DL — SIGNIFICANT CHANGE UP (ref 70–99)
GLUCOSE BLDC GLUCOMTR-MCNC: 87 MG/DL — SIGNIFICANT CHANGE UP (ref 70–99)
GLUCOSE BLDC GLUCOMTR-MCNC: 87 MG/DL — SIGNIFICANT CHANGE UP (ref 70–99)
GLUCOSE BLDC GLUCOMTR-MCNC: 94 MG/DL — SIGNIFICANT CHANGE UP (ref 70–99)
GLUCOSE BLDC GLUCOMTR-MCNC: 99 MG/DL — SIGNIFICANT CHANGE UP (ref 70–99)
GLUCOSE SERPL-MCNC: 100 MG/DL — HIGH (ref 70–99)
GLUCOSE SERPL-MCNC: 108 MG/DL — HIGH (ref 70–99)
GLUCOSE SERPL-MCNC: 127 MG/DL — HIGH (ref 70–99)
GLUCOSE SERPL-MCNC: 133 MG/DL — HIGH (ref 70–99)
GLUCOSE SERPL-MCNC: 134 MG/DL — HIGH (ref 70–99)
GLUCOSE SERPL-MCNC: 134 MG/DL — HIGH (ref 70–99)
GLUCOSE SERPL-MCNC: 135 MG/DL — HIGH (ref 70–99)
GLUCOSE SERPL-MCNC: 142 MG/DL — HIGH (ref 70–99)
GLUCOSE SERPL-MCNC: 142 MG/DL — HIGH (ref 70–99)
GLUCOSE SERPL-MCNC: 145 MG/DL — HIGH (ref 70–99)
GLUCOSE SERPL-MCNC: 146 MG/DL — HIGH (ref 70–99)
GLUCOSE SERPL-MCNC: 148 MG/DL — HIGH (ref 70–99)
GLUCOSE SERPL-MCNC: 155 MG/DL — HIGH (ref 70–99)
GLUCOSE SERPL-MCNC: 164 MG/DL — HIGH (ref 70–99)
GLUCOSE SERPL-MCNC: 189 MG/DL — HIGH (ref 70–99)
GLUCOSE SERPL-MCNC: 192 MG/DL — HIGH (ref 70–99)
GLUCOSE SERPL-MCNC: 192 MG/DL — HIGH (ref 70–99)
GLUCOSE SERPL-MCNC: 214 MG/DL — HIGH (ref 70–99)
GLUCOSE SERPL-MCNC: 222 MG/DL — HIGH (ref 70–99)
GLUCOSE SERPL-MCNC: 223 MG/DL — HIGH (ref 70–99)
GLUCOSE SERPL-MCNC: 232 MG/DL — HIGH (ref 70–99)
GLUCOSE SERPL-MCNC: 239 MG/DL — HIGH (ref 70–99)
GLUCOSE SERPL-MCNC: 241 MG/DL — HIGH (ref 70–99)
GLUCOSE SERPL-MCNC: 243 MG/DL — HIGH (ref 70–99)
GLUCOSE SERPL-MCNC: 273 MG/DL — HIGH (ref 70–99)
GLUCOSE SERPL-MCNC: 286 MG/DL — HIGH (ref 70–99)
GLUCOSE SERPL-MCNC: 324 MG/DL — HIGH (ref 70–99)
GLUCOSE SERPL-MCNC: 375 MG/DL — HIGH (ref 70–99)
GLUCOSE SERPL-MCNC: 394 MG/DL — HIGH (ref 70–99)
GLUCOSE SERPL-MCNC: 455 MG/DL — CRITICAL HIGH (ref 70–99)
GLUCOSE SERPL-MCNC: 47 MG/DL — CRITICAL LOW (ref 70–99)
GLUCOSE SERPL-MCNC: 61 MG/DL — LOW (ref 70–99)
GLUCOSE SERPL-MCNC: 86 MG/DL — SIGNIFICANT CHANGE UP (ref 70–99)
GLUCOSE SERPL-MCNC: 90 MG/DL — SIGNIFICANT CHANGE UP (ref 70–99)
GLUCOSE UR QL: 500 MG/DL
GLUCOSE UR QL: 500 MG/DL
GLUCOSE UR QL: >=1000 MG/DL
HCT VFR BLD CALC: 31.2 % — LOW (ref 39–50)
HCT VFR BLD CALC: 31.8 % — LOW (ref 39–50)
HCT VFR BLD CALC: 34.3 % — LOW (ref 39–50)
HCT VFR BLD CALC: 41.9 % — SIGNIFICANT CHANGE UP (ref 39–50)
HCT VFR BLD CALC: 43.9 % — SIGNIFICANT CHANGE UP (ref 39–50)
HCT VFR BLD CALC: 44.8 % — SIGNIFICANT CHANGE UP (ref 39–50)
HCT VFR BLD CALC: 48.2 % — SIGNIFICANT CHANGE UP (ref 39–50)
HCT VFR BLD CALC: 49.3 % — SIGNIFICANT CHANGE UP (ref 39–50)
HDLC SERPL-MCNC: 46 MG/DL — SIGNIFICANT CHANGE UP
HGB BLD-MCNC: 11 G/DL — LOW (ref 13–17)
HGB BLD-MCNC: 11.6 G/DL — LOW (ref 13–17)
HGB BLD-MCNC: 13.9 G/DL — SIGNIFICANT CHANGE UP (ref 13–17)
HGB BLD-MCNC: 14.2 G/DL — SIGNIFICANT CHANGE UP (ref 13–17)
HGB BLD-MCNC: 14.6 G/DL — SIGNIFICANT CHANGE UP (ref 13–17)
HGB BLD-MCNC: 15.8 G/DL — SIGNIFICANT CHANGE UP (ref 13–17)
HGB BLD-MCNC: 16.4 G/DL — SIGNIFICANT CHANGE UP (ref 13–17)
HGB BLD-MCNC: 9.8 G/DL — LOW (ref 13–17)
IMM GRANULOCYTES NFR BLD AUTO: 0.2 % — SIGNIFICANT CHANGE UP (ref 0–0.9)
IMM GRANULOCYTES NFR BLD AUTO: 0.4 % — SIGNIFICANT CHANGE UP (ref 0–0.9)
INR BLD: 6.06 RATIO — CRITICAL HIGH (ref 0.85–1.16)
IRON SATN MFR SERPL: 15 % — LOW (ref 16–55)
IRON SATN MFR SERPL: 64 UG/DL — SIGNIFICANT CHANGE UP (ref 45–165)
KETONES UR-MCNC: NEGATIVE MG/DL — SIGNIFICANT CHANGE UP
LDH SERPL L TO P-CCNC: 357 U/L — HIGH (ref 50–242)
LDLC SERPL-MCNC: 53 MG/DL — SIGNIFICANT CHANGE UP
LEUKOCYTE ESTERASE UR-ACNC: NEGATIVE — SIGNIFICANT CHANGE UP
LIPID PNL WITH DIRECT LDL SERPL: 53 MG/DL — SIGNIFICANT CHANGE UP
LYMPHOCYTES # BLD AUTO: 0.25 K/UL — LOW (ref 1–3.3)
LYMPHOCYTES # BLD AUTO: 0.78 K/UL — LOW (ref 1–3.3)
LYMPHOCYTES # BLD AUTO: 0.8 % — LOW (ref 13–44)
LYMPHOCYTES # BLD AUTO: 1.33 K/UL — SIGNIFICANT CHANGE UP (ref 1–3.3)
LYMPHOCYTES # BLD AUTO: 13.1 % — SIGNIFICANT CHANGE UP (ref 13–44)
LYMPHOCYTES # BLD AUTO: 8.8 % — LOW (ref 13–44)
MACROCYTES BLD QL: SLIGHT — SIGNIFICANT CHANGE UP
MAGNESIUM SERPL-MCNC: 2.6 MG/DL — SIGNIFICANT CHANGE UP (ref 1.6–2.6)
MAGNESIUM SERPL-MCNC: 2.6 MG/DL — SIGNIFICANT CHANGE UP (ref 1.6–2.6)
MAGNESIUM SERPL-MCNC: 2.7 MG/DL — HIGH (ref 1.6–2.6)
MAGNESIUM SERPL-MCNC: 2.8 MG/DL — HIGH (ref 1.6–2.6)
MAGNESIUM SERPL-MCNC: 2.9 MG/DL — HIGH (ref 1.6–2.6)
MANUAL SMEAR VERIFICATION: SIGNIFICANT CHANGE UP
MCHC RBC-ENTMCNC: 29.3 PG — SIGNIFICANT CHANGE UP (ref 27–34)
MCHC RBC-ENTMCNC: 29.3 PG — SIGNIFICANT CHANGE UP (ref 27–34)
MCHC RBC-ENTMCNC: 29.4 PG — SIGNIFICANT CHANGE UP (ref 27–34)
MCHC RBC-ENTMCNC: 29.5 PG — SIGNIFICANT CHANGE UP (ref 27–34)
MCHC RBC-ENTMCNC: 29.5 PG — SIGNIFICANT CHANGE UP (ref 27–34)
MCHC RBC-ENTMCNC: 29.6 PG — SIGNIFICANT CHANGE UP (ref 27–34)
MCHC RBC-ENTMCNC: 29.9 PG — SIGNIFICANT CHANGE UP (ref 27–34)
MCHC RBC-ENTMCNC: 30 PG — SIGNIFICANT CHANGE UP (ref 27–34)
MCHC RBC-ENTMCNC: 31.4 G/DL — LOW (ref 32–36)
MCHC RBC-ENTMCNC: 32.3 G/DL — SIGNIFICANT CHANGE UP (ref 32–36)
MCHC RBC-ENTMCNC: 32.6 G/DL — SIGNIFICANT CHANGE UP (ref 32–36)
MCHC RBC-ENTMCNC: 32.8 G/DL — SIGNIFICANT CHANGE UP (ref 32–36)
MCHC RBC-ENTMCNC: 33.2 G/DL — SIGNIFICANT CHANGE UP (ref 32–36)
MCHC RBC-ENTMCNC: 33.3 G/DL — SIGNIFICANT CHANGE UP (ref 32–36)
MCHC RBC-ENTMCNC: 33.8 G/DL — SIGNIFICANT CHANGE UP (ref 32–36)
MCHC RBC-ENTMCNC: 34.6 G/DL — SIGNIFICANT CHANGE UP (ref 32–36)
MCV RBC AUTO: 86.6 FL — SIGNIFICANT CHANGE UP (ref 80–100)
MCV RBC AUTO: 86.8 FL — SIGNIFICANT CHANGE UP (ref 80–100)
MCV RBC AUTO: 88.2 FL — SIGNIFICANT CHANGE UP (ref 80–100)
MCV RBC AUTO: 89 FL — SIGNIFICANT CHANGE UP (ref 80–100)
MCV RBC AUTO: 90.1 FL — SIGNIFICANT CHANGE UP (ref 80–100)
MCV RBC AUTO: 91.1 FL — SIGNIFICANT CHANGE UP (ref 80–100)
MCV RBC AUTO: 91.6 FL — SIGNIFICANT CHANGE UP (ref 80–100)
MCV RBC AUTO: 93.4 FL — SIGNIFICANT CHANGE UP (ref 80–100)
MONOCYTES # BLD AUTO: 0.7 K/UL — SIGNIFICANT CHANGE UP (ref 0–0.9)
MONOCYTES # BLD AUTO: 1.16 K/UL — HIGH (ref 0–0.9)
MONOCYTES # BLD AUTO: 1.56 K/UL — HIGH (ref 0–0.9)
MONOCYTES NFR BLD AUTO: 11.5 % — SIGNIFICANT CHANGE UP (ref 2–14)
MONOCYTES NFR BLD AUTO: 5.1 % — SIGNIFICANT CHANGE UP (ref 2–14)
MONOCYTES NFR BLD AUTO: 7.9 % — SIGNIFICANT CHANGE UP (ref 2–14)
NEUTROPHILS # BLD AUTO: 28.83 K/UL — HIGH (ref 1.8–7.4)
NEUTROPHILS # BLD AUTO: 7.23 K/UL — SIGNIFICANT CHANGE UP (ref 1.8–7.4)
NEUTROPHILS # BLD AUTO: 7.25 K/UL — SIGNIFICANT CHANGE UP (ref 1.8–7.4)
NEUTROPHILS NFR BLD AUTO: 71.6 % — SIGNIFICANT CHANGE UP (ref 43–77)
NEUTROPHILS NFR BLD AUTO: 81.9 % — HIGH (ref 43–77)
NEUTROPHILS NFR BLD AUTO: 94.1 % — HIGH (ref 43–77)
NITRITE UR-MCNC: NEGATIVE — SIGNIFICANT CHANGE UP
NONHDLC SERPL-MCNC: 70 MG/DL — SIGNIFICANT CHANGE UP
NRBC BLD AUTO-RTO: 0 /100 WBCS — SIGNIFICANT CHANGE UP (ref 0–0)
NT-PROBNP SERPL-SCNC: HIGH PG/ML (ref 0–300)
NT-PROBNP SERPL-SCNC: HIGH PG/ML (ref 0–300)
OSMOLALITY UR: 262 MOS/KG — LOW (ref 300–900)
OVALOCYTES BLD QL SMEAR: SLIGHT — SIGNIFICANT CHANGE UP
PH UR: 7 — SIGNIFICANT CHANGE UP (ref 5–8)
PH UR: 7 — SIGNIFICANT CHANGE UP (ref 5–8)
PH UR: 7.5 — SIGNIFICANT CHANGE UP (ref 5–8)
PHOSPHATE SERPL-MCNC: 4.1 MG/DL — SIGNIFICANT CHANGE UP (ref 2.5–4.5)
PHOSPHATE SERPL-MCNC: 4.3 MG/DL — SIGNIFICANT CHANGE UP (ref 2.5–4.5)
PHOSPHATE SERPL-MCNC: 5.8 MG/DL — HIGH (ref 2.5–4.5)
PHOSPHATE SERPL-MCNC: 9.2 MG/DL — HIGH (ref 2.5–4.5)
PLAT MORPH BLD: ABNORMAL
PLATELET # BLD AUTO: 240 K/UL — SIGNIFICANT CHANGE UP (ref 150–400)
PLATELET # BLD AUTO: 262 K/UL — SIGNIFICANT CHANGE UP (ref 150–400)
PLATELET # BLD AUTO: 275 K/UL — SIGNIFICANT CHANGE UP (ref 150–400)
PLATELET # BLD AUTO: 301 K/UL — SIGNIFICANT CHANGE UP (ref 150–400)
PLATELET # BLD AUTO: 306 K/UL — SIGNIFICANT CHANGE UP (ref 150–400)
PLATELET # BLD AUTO: 320 K/UL — SIGNIFICANT CHANGE UP (ref 150–400)
PLATELET # BLD AUTO: 334 K/UL — SIGNIFICANT CHANGE UP (ref 150–400)
PLATELET # BLD AUTO: 346 K/UL — SIGNIFICANT CHANGE UP (ref 150–400)
POIKILOCYTOSIS BLD QL AUTO: SLIGHT — SIGNIFICANT CHANGE UP
POTASSIUM SERPL-MCNC: 3.2 MMOL/L — LOW (ref 3.5–5.3)
POTASSIUM SERPL-MCNC: 3.3 MMOL/L — LOW (ref 3.5–5.3)
POTASSIUM SERPL-MCNC: 3.4 MMOL/L — LOW (ref 3.5–5.3)
POTASSIUM SERPL-MCNC: 3.6 MMOL/L — SIGNIFICANT CHANGE UP (ref 3.5–5.3)
POTASSIUM SERPL-MCNC: 3.6 MMOL/L — SIGNIFICANT CHANGE UP (ref 3.5–5.3)
POTASSIUM SERPL-MCNC: 3.8 MMOL/L — SIGNIFICANT CHANGE UP (ref 3.5–5.3)
POTASSIUM SERPL-MCNC: 3.9 MMOL/L — SIGNIFICANT CHANGE UP (ref 3.5–5.3)
POTASSIUM SERPL-MCNC: 3.9 MMOL/L — SIGNIFICANT CHANGE UP (ref 3.5–5.3)
POTASSIUM SERPL-MCNC: 4 MMOL/L — SIGNIFICANT CHANGE UP (ref 3.5–5.3)
POTASSIUM SERPL-MCNC: 4.1 MMOL/L — SIGNIFICANT CHANGE UP (ref 3.5–5.3)
POTASSIUM SERPL-MCNC: 4.1 MMOL/L — SIGNIFICANT CHANGE UP (ref 3.5–5.3)
POTASSIUM SERPL-MCNC: 4.2 MMOL/L — SIGNIFICANT CHANGE UP (ref 3.5–5.3)
POTASSIUM SERPL-MCNC: 4.3 MMOL/L — SIGNIFICANT CHANGE UP (ref 3.5–5.3)
POTASSIUM SERPL-MCNC: 4.4 MMOL/L — SIGNIFICANT CHANGE UP (ref 3.5–5.3)
POTASSIUM SERPL-MCNC: 4.6 MMOL/L — SIGNIFICANT CHANGE UP (ref 3.5–5.3)
POTASSIUM SERPL-MCNC: 4.7 MMOL/L — SIGNIFICANT CHANGE UP (ref 3.5–5.3)
POTASSIUM SERPL-MCNC: 5.1 MMOL/L — SIGNIFICANT CHANGE UP (ref 3.5–5.3)
POTASSIUM SERPL-MCNC: 5.2 MMOL/L — SIGNIFICANT CHANGE UP (ref 3.5–5.3)
POTASSIUM SERPL-MCNC: 5.6 MMOL/L — HIGH (ref 3.5–5.3)
POTASSIUM SERPL-MCNC: 6.2 MMOL/L — CRITICAL HIGH (ref 3.5–5.3)
POTASSIUM SERPL-SCNC: 3.2 MMOL/L — LOW (ref 3.5–5.3)
POTASSIUM SERPL-SCNC: 3.3 MMOL/L — LOW (ref 3.5–5.3)
POTASSIUM SERPL-SCNC: 3.4 MMOL/L — LOW (ref 3.5–5.3)
POTASSIUM SERPL-SCNC: 3.6 MMOL/L — SIGNIFICANT CHANGE UP (ref 3.5–5.3)
POTASSIUM SERPL-SCNC: 3.6 MMOL/L — SIGNIFICANT CHANGE UP (ref 3.5–5.3)
POTASSIUM SERPL-SCNC: 3.8 MMOL/L — SIGNIFICANT CHANGE UP (ref 3.5–5.3)
POTASSIUM SERPL-SCNC: 3.9 MMOL/L — SIGNIFICANT CHANGE UP (ref 3.5–5.3)
POTASSIUM SERPL-SCNC: 3.9 MMOL/L — SIGNIFICANT CHANGE UP (ref 3.5–5.3)
POTASSIUM SERPL-SCNC: 4 MMOL/L — SIGNIFICANT CHANGE UP (ref 3.5–5.3)
POTASSIUM SERPL-SCNC: 4.1 MMOL/L — SIGNIFICANT CHANGE UP (ref 3.5–5.3)
POTASSIUM SERPL-SCNC: 4.1 MMOL/L — SIGNIFICANT CHANGE UP (ref 3.5–5.3)
POTASSIUM SERPL-SCNC: 4.2 MMOL/L — SIGNIFICANT CHANGE UP (ref 3.5–5.3)
POTASSIUM SERPL-SCNC: 4.3 MMOL/L — SIGNIFICANT CHANGE UP (ref 3.5–5.3)
POTASSIUM SERPL-SCNC: 4.4 MMOL/L — SIGNIFICANT CHANGE UP (ref 3.5–5.3)
POTASSIUM SERPL-SCNC: 4.6 MMOL/L — SIGNIFICANT CHANGE UP (ref 3.5–5.3)
POTASSIUM SERPL-SCNC: 4.7 MMOL/L — SIGNIFICANT CHANGE UP (ref 3.5–5.3)
POTASSIUM SERPL-SCNC: 5.1 MMOL/L — SIGNIFICANT CHANGE UP (ref 3.5–5.3)
POTASSIUM SERPL-SCNC: 5.2 MMOL/L — SIGNIFICANT CHANGE UP (ref 3.5–5.3)
POTASSIUM SERPL-SCNC: 5.6 MMOL/L — HIGH (ref 3.5–5.3)
POTASSIUM SERPL-SCNC: 6.2 MMOL/L — CRITICAL HIGH (ref 3.5–5.3)
POTASSIUM UR-SCNC: 16 MMOL/L — SIGNIFICANT CHANGE UP
PROT ?TM UR-MCNC: 17 MG/DL — HIGH (ref 0–12)
PROT SERPL-MCNC: 6.5 G/DL — SIGNIFICANT CHANGE UP (ref 6–8.3)
PROT SERPL-MCNC: 7 G/DL — SIGNIFICANT CHANGE UP (ref 6–8.3)
PROT SERPL-MCNC: 7.1 G/DL — SIGNIFICANT CHANGE UP (ref 6–8.3)
PROT SERPL-MCNC: 7.4 G/DL — SIGNIFICANT CHANGE UP (ref 6–8.3)
PROT UR-MCNC: NEGATIVE MG/DL — SIGNIFICANT CHANGE UP
PROT UR-MCNC: SIGNIFICANT CHANGE UP MG/DL
PROT UR-MCNC: SIGNIFICANT CHANGE UP MG/DL
PROTHROM AB SERPL-ACNC: 67.9 SEC — HIGH (ref 9.9–13.4)
RBC # BLD: 3.34 M/UL — LOW (ref 4.2–5.8)
RBC # BLD: 3.67 M/UL — LOW (ref 4.2–5.8)
RBC # BLD: 3.95 M/UL — LOW (ref 4.2–5.8)
RBC # BLD: 4.75 M/UL — SIGNIFICANT CHANGE UP (ref 4.2–5.8)
RBC # BLD: 4.82 M/UL — SIGNIFICANT CHANGE UP (ref 4.2–5.8)
RBC # BLD: 4.89 M/UL — SIGNIFICANT CHANGE UP (ref 4.2–5.8)
RBC # BLD: 5.35 M/UL — SIGNIFICANT CHANGE UP (ref 4.2–5.8)
RBC # BLD: 5.54 M/UL — SIGNIFICANT CHANGE UP (ref 4.2–5.8)
RBC # FLD: 15.6 % — HIGH (ref 10.3–14.5)
RBC # FLD: 15.9 % — HIGH (ref 10.3–14.5)
RBC # FLD: 16 % — HIGH (ref 10.3–14.5)
RBC # FLD: 16.1 % — HIGH (ref 10.3–14.5)
RBC # FLD: 16.4 % — HIGH (ref 10.3–14.5)
RBC BLD AUTO: ABNORMAL
RH IG SCN BLD-IMP: POSITIVE — SIGNIFICANT CHANGE UP
SODIUM SERPL-SCNC: 126 MMOL/L — LOW (ref 135–145)
SODIUM SERPL-SCNC: 127 MMOL/L — LOW (ref 135–145)
SODIUM SERPL-SCNC: 128 MMOL/L — LOW (ref 135–145)
SODIUM SERPL-SCNC: 129 MMOL/L — LOW (ref 135–145)
SODIUM SERPL-SCNC: 129 MMOL/L — LOW (ref 135–145)
SODIUM SERPL-SCNC: 130 MMOL/L — LOW (ref 135–145)
SODIUM SERPL-SCNC: 131 MMOL/L — LOW (ref 135–145)
SODIUM SERPL-SCNC: 131 MMOL/L — LOW (ref 135–145)
SODIUM SERPL-SCNC: 132 MMOL/L — LOW (ref 135–145)
SODIUM SERPL-SCNC: 133 MMOL/L — LOW (ref 135–145)
SODIUM SERPL-SCNC: 134 MMOL/L — LOW (ref 135–145)
SODIUM SERPL-SCNC: 135 MMOL/L — SIGNIFICANT CHANGE UP (ref 135–145)
SODIUM SERPL-SCNC: 136 MMOL/L — SIGNIFICANT CHANGE UP (ref 135–145)
SODIUM SERPL-SCNC: 136 MMOL/L — SIGNIFICANT CHANGE UP (ref 135–145)
SODIUM SERPL-SCNC: 137 MMOL/L — SIGNIFICANT CHANGE UP (ref 135–145)
SODIUM UR-SCNC: 36 MMOL/L — SIGNIFICANT CHANGE UP
SODIUM UR-SCNC: 40 MMOL/L — SIGNIFICANT CHANGE UP
SP GR SPEC: 1.01 — SIGNIFICANT CHANGE UP (ref 1–1.03)
SP GR SPEC: 1.01 — SIGNIFICANT CHANGE UP (ref 1–1.03)
SP GR SPEC: 1.02 — SIGNIFICANT CHANGE UP (ref 1–1.03)
TIBC SERPL-MCNC: 430 UG/DL — SIGNIFICANT CHANGE UP (ref 220–430)
TRIGL SERPL-MCNC: 83 MG/DL — SIGNIFICANT CHANGE UP
TROPONIN T, HIGH SENSITIVITY RESULT: 365 NG/L — HIGH (ref 0–51)
TROPONIN T, HIGH SENSITIVITY RESULT: 402 NG/L — HIGH (ref 0–51)
TROPONIN T, HIGH SENSITIVITY RESULT: 416 NG/L — HIGH (ref 0–51)
TROPONIN T, HIGH SENSITIVITY RESULT: 443 NG/L — HIGH (ref 0–51)
UIBC SERPL-MCNC: 366 UG/DL — SIGNIFICANT CHANGE UP (ref 110–370)
UROBILINOGEN FLD QL: 1 MG/DL — SIGNIFICANT CHANGE UP (ref 0.2–1)
WBC # BLD: 10.12 K/UL — SIGNIFICANT CHANGE UP (ref 3.8–10.5)
WBC # BLD: 10.79 K/UL — HIGH (ref 3.8–10.5)
WBC # BLD: 13.43 K/UL — HIGH (ref 3.8–10.5)
WBC # BLD: 30.64 K/UL — HIGH (ref 3.8–10.5)
WBC # BLD: 33.89 K/UL — HIGH (ref 3.8–10.5)
WBC # BLD: 8.83 K/UL — SIGNIFICANT CHANGE UP (ref 3.8–10.5)
WBC # BLD: 9.13 K/UL — SIGNIFICANT CHANGE UP (ref 3.8–10.5)
WBC # BLD: 9.6 K/UL — SIGNIFICANT CHANGE UP (ref 3.8–10.5)
WBC # FLD AUTO: 10.12 K/UL — SIGNIFICANT CHANGE UP (ref 3.8–10.5)
WBC # FLD AUTO: 10.79 K/UL — HIGH (ref 3.8–10.5)
WBC # FLD AUTO: 13.43 K/UL — HIGH (ref 3.8–10.5)
WBC # FLD AUTO: 30.64 K/UL — HIGH (ref 3.8–10.5)
WBC # FLD AUTO: 33.89 K/UL — HIGH (ref 3.8–10.5)
WBC # FLD AUTO: 8.83 K/UL — SIGNIFICANT CHANGE UP (ref 3.8–10.5)
WBC # FLD AUTO: 9.13 K/UL — SIGNIFICANT CHANGE UP (ref 3.8–10.5)
WBC # FLD AUTO: 9.6 K/UL — SIGNIFICANT CHANGE UP (ref 3.8–10.5)

## 2025-01-01 PROCEDURE — 82570 ASSAY OF URINE CREATININE: CPT

## 2025-01-01 PROCEDURE — 93308 TTE F-UP OR LMTD: CPT

## 2025-01-01 PROCEDURE — 82553 CREATINE MB FRACTION: CPT

## 2025-01-01 PROCEDURE — 99232 SBSQ HOSP IP/OBS MODERATE 35: CPT

## 2025-01-01 PROCEDURE — 84300 ASSAY OF URINE SODIUM: CPT

## 2025-01-01 PROCEDURE — 70450 CT HEAD/BRAIN W/O DYE: CPT | Mod: 26

## 2025-01-01 PROCEDURE — 85610 PROTHROMBIN TIME: CPT

## 2025-01-01 PROCEDURE — 84484 ASSAY OF TROPONIN QUANT: CPT

## 2025-01-01 PROCEDURE — 93010 ELECTROCARDIOGRAM REPORT: CPT

## 2025-01-01 PROCEDURE — 85018 HEMOGLOBIN: CPT

## 2025-01-01 PROCEDURE — 93308 TTE F-UP OR LMTD: CPT | Mod: 26

## 2025-01-01 PROCEDURE — 99232 SBSQ HOSP IP/OBS MODERATE 35: CPT | Mod: FS

## 2025-01-01 PROCEDURE — 84100 ASSAY OF PHOSPHORUS: CPT

## 2025-01-01 PROCEDURE — 71045 X-RAY EXAM CHEST 1 VIEW: CPT | Mod: 26

## 2025-01-01 PROCEDURE — 82140 ASSAY OF AMMONIA: CPT

## 2025-01-01 PROCEDURE — 82330 ASSAY OF CALCIUM: CPT

## 2025-01-01 PROCEDURE — 99223 1ST HOSP IP/OBS HIGH 75: CPT

## 2025-01-01 PROCEDURE — 99285 EMERGENCY DEPT VISIT HI MDM: CPT

## 2025-01-01 PROCEDURE — 82435 ASSAY OF BLOOD CHLORIDE: CPT

## 2025-01-01 PROCEDURE — 84133 ASSAY OF URINE POTASSIUM: CPT

## 2025-01-01 PROCEDURE — 99233 SBSQ HOSP IP/OBS HIGH 50: CPT | Mod: FS

## 2025-01-01 PROCEDURE — 99221 1ST HOSP IP/OBS SF/LOW 40: CPT

## 2025-01-01 PROCEDURE — 83880 ASSAY OF NATRIURETIC PEPTIDE: CPT

## 2025-01-01 PROCEDURE — 82803 BLOOD GASES ANY COMBINATION: CPT

## 2025-01-01 PROCEDURE — 97161 PT EVAL LOW COMPLEX 20 MIN: CPT

## 2025-01-01 PROCEDURE — 93005 ELECTROCARDIOGRAM TRACING: CPT

## 2025-01-01 PROCEDURE — 80053 COMPREHEN METABOLIC PANEL: CPT

## 2025-01-01 PROCEDURE — 70450 CT HEAD/BRAIN W/O DYE: CPT | Mod: MC

## 2025-01-01 PROCEDURE — 33289 TCAT IMPL WRLS P-ART PRS SNR: CPT

## 2025-01-01 PROCEDURE — 82550 ASSAY OF CK (CPK): CPT

## 2025-01-01 PROCEDURE — 99152 MOD SED SAME PHYS/QHP 5/>YRS: CPT

## 2025-01-01 PROCEDURE — 85014 HEMATOCRIT: CPT

## 2025-01-01 PROCEDURE — 84295 ASSAY OF SERUM SODIUM: CPT

## 2025-01-01 PROCEDURE — 84132 ASSAY OF SERUM POTASSIUM: CPT

## 2025-01-01 PROCEDURE — 85027 COMPLETE CBC AUTOMATED: CPT

## 2025-01-01 PROCEDURE — C1894: CPT

## 2025-01-01 PROCEDURE — 97530 THERAPEUTIC ACTIVITIES: CPT

## 2025-01-01 PROCEDURE — 84156 ASSAY OF PROTEIN URINE: CPT

## 2025-01-01 PROCEDURE — 85025 COMPLETE CBC W/AUTO DIFF WBC: CPT

## 2025-01-01 PROCEDURE — 82947 ASSAY GLUCOSE BLOOD QUANT: CPT

## 2025-01-01 PROCEDURE — 99223 1ST HOSP IP/OBS HIGH 75: CPT | Mod: FS

## 2025-01-01 PROCEDURE — 99285 EMERGENCY DEPT VISIT HI MDM: CPT | Mod: FS

## 2025-01-01 PROCEDURE — 99232 SBSQ HOSP IP/OBS MODERATE 35: CPT | Mod: GC

## 2025-01-01 PROCEDURE — 85730 THROMBOPLASTIN TIME PARTIAL: CPT

## 2025-01-01 PROCEDURE — C1889: CPT

## 2025-01-01 PROCEDURE — 82962 GLUCOSE BLOOD TEST: CPT

## 2025-01-01 PROCEDURE — 80061 LIPID PANEL: CPT

## 2025-01-01 PROCEDURE — 93970 EXTREMITY STUDY: CPT

## 2025-01-01 PROCEDURE — C1769: CPT

## 2025-01-01 PROCEDURE — C2624: CPT

## 2025-01-01 PROCEDURE — 83036 HEMOGLOBIN GLYCOSYLATED A1C: CPT

## 2025-01-01 PROCEDURE — 83735 ASSAY OF MAGNESIUM: CPT

## 2025-01-01 PROCEDURE — 83615 LACTATE (LD) (LDH) ENZYME: CPT

## 2025-01-01 PROCEDURE — 86900 BLOOD TYPING SEROLOGIC ABO: CPT

## 2025-01-01 PROCEDURE — 86850 RBC ANTIBODY SCREEN: CPT

## 2025-01-01 PROCEDURE — 83540 ASSAY OF IRON: CPT

## 2025-01-01 PROCEDURE — 71045 X-RAY EXAM CHEST 1 VIEW: CPT

## 2025-01-01 PROCEDURE — 97112 NEUROMUSCULAR REEDUCATION: CPT

## 2025-01-01 PROCEDURE — 36415 COLL VENOUS BLD VENIPUNCTURE: CPT

## 2025-01-01 PROCEDURE — 83935 ASSAY OF URINE OSMOLALITY: CPT

## 2025-01-01 PROCEDURE — 83605 ASSAY OF LACTIC ACID: CPT

## 2025-01-01 PROCEDURE — 82728 ASSAY OF FERRITIN: CPT

## 2025-01-01 PROCEDURE — 86901 BLOOD TYPING SEROLOGIC RH(D): CPT

## 2025-01-01 PROCEDURE — 81003 URINALYSIS AUTO W/O SCOPE: CPT

## 2025-01-01 PROCEDURE — 80048 BASIC METABOLIC PNL TOTAL CA: CPT

## 2025-01-01 PROCEDURE — 93970 EXTREMITY STUDY: CPT | Mod: 26

## 2025-01-01 PROCEDURE — 83550 IRON BINDING TEST: CPT

## 2025-01-01 PROCEDURE — 86922 COMPATIBILITY TEST ANTIGLOB: CPT

## 2025-01-01 RX ORDER — OXYCODONE HYDROCHLORIDE 30 MG/1
2.5 TABLET ORAL EVERY 6 HOURS
Refills: 0 | Status: DISCONTINUED | OUTPATIENT
Start: 2025-01-01 | End: 2025-01-01

## 2025-01-01 RX ORDER — DEXTROSE 50 % IN WATER 50 %
25 SYRINGE (ML) INTRAVENOUS ONCE
Refills: 0 | Status: DISCONTINUED | OUTPATIENT
Start: 2025-01-01 | End: 2025-01-01

## 2025-01-01 RX ORDER — GABAPENTIN 400 MG/1
100 CAPSULE ORAL ONCE
Refills: 0 | Status: COMPLETED | OUTPATIENT
Start: 2025-01-01 | End: 2025-01-01

## 2025-01-01 RX ORDER — INSULIN GLARGINE-YFGN 100 [IU]/ML
9 INJECTION, SOLUTION SUBCUTANEOUS AT BEDTIME
Refills: 0 | Status: DISCONTINUED | OUTPATIENT
Start: 2025-01-01 | End: 2025-01-01

## 2025-01-01 RX ORDER — INSULIN LISPRO 100 U/ML
13 INJECTION, SOLUTION INTRAVENOUS; SUBCUTANEOUS
Refills: 0 | Status: DISCONTINUED | OUTPATIENT
Start: 2025-01-01 | End: 2025-01-01

## 2025-01-01 RX ORDER — POTASSIUM CHLORIDE, DEXTROSE MONOHYDRATE AND SODIUM CHLORIDE 150; 5; 900 MG/100ML; G/100ML; MG/100ML
1000 INJECTION, SOLUTION INTRAVENOUS
Refills: 0 | Status: DISCONTINUED | OUTPATIENT
Start: 2025-01-01 | End: 2025-01-01

## 2025-01-01 RX ORDER — DEXTROSE 50 % IN WATER 50 %
12.5 SYRINGE (ML) INTRAVENOUS ONCE
Refills: 0 | Status: DISCONTINUED | OUTPATIENT
Start: 2025-01-01 | End: 2025-01-01

## 2025-01-01 RX ORDER — SPIRONOLACTONE 25 MG
25 TABLET ORAL
Refills: 0 | Status: DISCONTINUED | OUTPATIENT
Start: 2025-01-01 | End: 2025-01-01

## 2025-01-01 RX ORDER — GLUCAGON 3 MG/1
1 POWDER NASAL ONCE
Refills: 0 | Status: DISCONTINUED | OUTPATIENT
Start: 2025-01-01 | End: 2025-01-01

## 2025-01-01 RX ORDER — ISOSORBDIE DINITRATE 30 MG/1
30 TABLET ORAL THREE TIMES A DAY
Refills: 0 | Status: DISCONTINUED | OUTPATIENT
Start: 2025-01-01 | End: 2025-01-01

## 2025-01-01 RX ORDER — SPIRONOLACTONE 25 MG
50 TABLET ORAL DAILY
Refills: 0 | Status: DISCONTINUED | OUTPATIENT
Start: 2025-01-01 | End: 2025-01-01

## 2025-01-01 RX ORDER — BUMETANIDE 1 MG/1
1 TABLET ORAL
Qty: 20 | Refills: 0 | Status: DISCONTINUED | OUTPATIENT
Start: 2025-01-01 | End: 2025-01-01

## 2025-01-01 RX ORDER — METOPROLOL SUCCINATE 50 MG/1
100 TABLET, EXTENDED RELEASE ORAL DAILY
Refills: 0 | Status: DISCONTINUED | OUTPATIENT
Start: 2025-01-01 | End: 2025-01-01

## 2025-01-01 RX ORDER — SPIRONOLACTONE 25 MG
25 TABLET ORAL DAILY
Refills: 0 | Status: DISCONTINUED | OUTPATIENT
Start: 2025-01-01 | End: 2025-01-01

## 2025-01-01 RX ORDER — ISOSORBDIE DINITRATE 30 MG/1
10 TABLET ORAL THREE TIMES A DAY
Refills: 0 | Status: DISCONTINUED | OUTPATIENT
Start: 2025-01-01 | End: 2025-01-01

## 2025-01-01 RX ORDER — LIDOCAINE HYDROCHLORIDE 20 MG/ML
1 JELLY TOPICAL ONCE
Refills: 0 | Status: COMPLETED | OUTPATIENT
Start: 2025-01-01 | End: 2025-01-01

## 2025-01-01 RX ORDER — INSULIN GLARGINE-YFGN 100 [IU]/ML
20 INJECTION, SOLUTION SUBCUTANEOUS AT BEDTIME
Refills: 0 | Status: DISCONTINUED | OUTPATIENT
Start: 2025-01-01 | End: 2025-01-01

## 2025-01-01 RX ORDER — B1/B2/B3/B5/B6/B12/VIT C/FOLIC 500-0.5 MG
1 TABLET ORAL DAILY
Refills: 0 | Status: DISCONTINUED | OUTPATIENT
Start: 2025-01-01 | End: 2025-01-01

## 2025-01-01 RX ORDER — DEXTROSE 50 % IN WATER 50 %
15 SYRINGE (ML) INTRAVENOUS ONCE
Refills: 0 | Status: DISCONTINUED | OUTPATIENT
Start: 2025-01-01 | End: 2025-01-01

## 2025-01-01 RX ORDER — SODIUM BICARBONATE 1 MEQ/ML
650 SYRINGE (ML) INTRAVENOUS THREE TIMES A DAY
Refills: 0 | Status: DISCONTINUED | OUTPATIENT
Start: 2025-01-01 | End: 2025-01-01

## 2025-01-01 RX ORDER — SODIUM CHLORIDE 9 G/1000ML
1000 INJECTION, SOLUTION INTRAVENOUS
Refills: 0 | Status: DISCONTINUED | OUTPATIENT
Start: 2025-01-01 | End: 2025-01-01

## 2025-01-01 RX ORDER — ASPIRIN 325 MG
81 TABLET ORAL DAILY
Refills: 0 | Status: DISCONTINUED | OUTPATIENT
Start: 2025-01-01 | End: 2025-01-01

## 2025-01-01 RX ORDER — SODIUM CHLORIDE 3 G/100ML
150 INJECTION, SOLUTION INTRAVENOUS
Refills: 0 | Status: COMPLETED | OUTPATIENT
Start: 2025-01-01 | End: 2025-01-01

## 2025-01-01 RX ORDER — INSULIN LISPRO 100 U/ML
12 INJECTION, SOLUTION INTRAVENOUS; SUBCUTANEOUS
Qty: 1 | Refills: 0 | DISCHARGE
Start: 2025-01-01 | End: 2025-01-01

## 2025-01-01 RX ORDER — INSULIN GLARGINE-YFGN 100 [IU]/ML
15 INJECTION, SOLUTION SUBCUTANEOUS AT BEDTIME
Refills: 0 | Status: DISCONTINUED | OUTPATIENT
Start: 2025-01-01 | End: 2025-01-01

## 2025-01-01 RX ORDER — BISACODYL 5 MG
5 TABLET, DELAYED RELEASE (ENTERIC COATED) ORAL EVERY 12 HOURS
Refills: 0 | Status: DISCONTINUED | OUTPATIENT
Start: 2025-01-01 | End: 2025-01-01

## 2025-01-01 RX ORDER — OXYCODONE HYDROCHLORIDE 30 MG/1
2.5 TABLET ORAL ONCE
Refills: 0 | Status: DISCONTINUED | OUTPATIENT
Start: 2025-01-01 | End: 2025-01-01

## 2025-01-01 RX ORDER — MELATONIN 5 MG
3 TABLET ORAL AT BEDTIME
Refills: 0 | Status: DISCONTINUED | OUTPATIENT
Start: 2025-01-01 | End: 2025-01-01

## 2025-01-01 RX ORDER — ISOSORBDIE DINITRATE 30 MG/1
20 TABLET ORAL THREE TIMES A DAY
Refills: 0 | Status: DISCONTINUED | OUTPATIENT
Start: 2025-01-01 | End: 2025-01-01

## 2025-01-01 RX ORDER — INSULIN LISPRO 100 U/ML
9 INJECTION, SOLUTION INTRAVENOUS; SUBCUTANEOUS
Refills: 0 | Status: DISCONTINUED | OUTPATIENT
Start: 2025-01-01 | End: 2025-01-01

## 2025-01-01 RX ORDER — RIVAROXABAN 10 MG/1
15 TABLET, FILM COATED ORAL
Refills: 0 | Status: DISCONTINUED | OUTPATIENT
Start: 2025-01-01 | End: 2025-01-01

## 2025-01-01 RX ORDER — NITROGLYCERIN 20 MG/G
0.4 OINTMENT TOPICAL ONCE
Refills: 0 | Status: COMPLETED | OUTPATIENT
Start: 2025-01-01 | End: 2025-01-01

## 2025-01-01 RX ORDER — POLYETHYLENE GLYCOL 3350 17 G/17G
17 POWDER, FOR SOLUTION ORAL ONCE
Refills: 0 | Status: COMPLETED | OUTPATIENT
Start: 2025-01-01 | End: 2025-01-01

## 2025-01-01 RX ORDER — MAGNESIUM, ALUMINUM HYDROXIDE 200-200 MG
30 TABLET,CHEWABLE ORAL EVERY 4 HOURS
Refills: 0 | Status: DISCONTINUED | OUTPATIENT
Start: 2025-01-01 | End: 2025-01-01

## 2025-01-01 RX ORDER — ACETAMINOPHEN 500 MG/5ML
1000 LIQUID (ML) ORAL ONCE
Refills: 0 | Status: COMPLETED | OUTPATIENT
Start: 2025-01-01 | End: 2025-01-01

## 2025-01-01 RX ORDER — INSULIN LISPRO 100 U/ML
10 INJECTION, SOLUTION INTRAVENOUS; SUBCUTANEOUS
Refills: 0 | Status: DISCONTINUED | OUTPATIENT
Start: 2025-01-01 | End: 2025-01-01

## 2025-01-01 RX ORDER — INSULIN LISPRO 100 U/ML
INJECTION, SOLUTION INTRAVENOUS; SUBCUTANEOUS
Refills: 0 | Status: DISCONTINUED | OUTPATIENT
Start: 2025-01-01 | End: 2025-01-01

## 2025-01-01 RX ORDER — ACETAMINOPHEN 500 MG/5ML
2 LIQUID (ML) ORAL
Refills: 0 | DISCHARGE

## 2025-01-01 RX ORDER — WHITE PETROLATUM 1 G/G
1 OINTMENT TOPICAL
Refills: 0 | Status: DISCONTINUED | OUTPATIENT
Start: 2025-01-01 | End: 2025-01-01

## 2025-01-01 RX ORDER — NITROGLYCERIN 20 MG/G
0.4 OINTMENT TOPICAL ONCE
Refills: 0 | Status: COMPLETED | OUTPATIENT
Start: 2025-01-01 | End: 2026-03-06

## 2025-01-01 RX ORDER — INSULIN LISPRO 100 U/ML
2 INJECTION, SOLUTION INTRAVENOUS; SUBCUTANEOUS ONCE
Refills: 0 | Status: COMPLETED | OUTPATIENT
Start: 2025-01-01 | End: 2025-01-01

## 2025-01-01 RX ORDER — INSULIN LISPRO 100 U/ML
8 INJECTION, SOLUTION INTRAVENOUS; SUBCUTANEOUS
Refills: 0 | Status: DISCONTINUED | OUTPATIENT
Start: 2025-01-01 | End: 2025-01-01

## 2025-01-01 RX ORDER — INSULIN GLARGINE-YFGN 100 [IU]/ML
18 INJECTION, SOLUTION SUBCUTANEOUS AT BEDTIME
Refills: 0 | Status: DISCONTINUED | OUTPATIENT
Start: 2025-01-01 | End: 2025-01-01

## 2025-01-01 RX ORDER — INSULIN GLARGINE-YFGN 100 [IU]/ML
17 INJECTION, SOLUTION SUBCUTANEOUS AT BEDTIME
Refills: 0 | Status: DISCONTINUED | OUTPATIENT
Start: 2025-01-01 | End: 2025-01-01

## 2025-01-01 RX ORDER — ACETAMINOPHEN 500 MG/5ML
650 LIQUID (ML) ORAL EVERY 6 HOURS
Refills: 0 | Status: DISCONTINUED | OUTPATIENT
Start: 2025-01-01 | End: 2025-01-01

## 2025-01-01 RX ORDER — METOPROLOL SUCCINATE 50 MG/1
75 TABLET, EXTENDED RELEASE ORAL DAILY
Refills: 0 | Status: DISCONTINUED | OUTPATIENT
Start: 2025-01-01 | End: 2025-01-01

## 2025-01-01 RX ORDER — RANOLAZINE 1000 MG/1
1000 TABLET, FILM COATED, EXTENDED RELEASE ORAL
Refills: 0 | Status: DISCONTINUED | OUTPATIENT
Start: 2025-01-01 | End: 2025-01-01

## 2025-01-01 RX ORDER — INSULIN LISPRO 100 U/ML
6 INJECTION, SOLUTION INTRAVENOUS; SUBCUTANEOUS
Refills: 0 | Status: DISCONTINUED | OUTPATIENT
Start: 2025-01-01 | End: 2025-01-01

## 2025-01-01 RX ORDER — SODIUM CHLORIDE 3 G/100ML
150 INJECTION, SOLUTION INTRAVENOUS
Refills: 0 | Status: DISCONTINUED | OUTPATIENT
Start: 2025-01-01 | End: 2025-01-01

## 2025-01-01 RX ORDER — BUMETANIDE 1 MG/1
2 TABLET ORAL ONCE
Refills: 0 | Status: COMPLETED | OUTPATIENT
Start: 2025-01-01 | End: 2025-01-01

## 2025-01-01 RX ORDER — ISOSORBDIE DINITRATE 30 MG/1
40 TABLET ORAL THREE TIMES A DAY
Refills: 0 | Status: DISCONTINUED | OUTPATIENT
Start: 2025-01-01 | End: 2025-01-01

## 2025-01-01 RX ORDER — ISOSORBDIE DINITRATE 30 MG/1
60 TABLET ORAL THREE TIMES A DAY
Refills: 0 | Status: DISCONTINUED | OUTPATIENT
Start: 2025-01-01 | End: 2025-01-01

## 2025-01-01 RX ORDER — SODIUM CHLORIDE 3 G/100ML
150 INJECTION, SOLUTION INTRAVENOUS ONCE
Refills: 0 | Status: COMPLETED | OUTPATIENT
Start: 2025-01-01 | End: 2025-01-01

## 2025-01-01 RX ORDER — ONDANSETRON HCL/PF 4 MG/2 ML
4 VIAL (ML) INJECTION EVERY 8 HOURS
Refills: 0 | Status: DISCONTINUED | OUTPATIENT
Start: 2025-01-01 | End: 2025-01-01

## 2025-01-01 RX ORDER — RANOLAZINE 1000 MG/1
500 TABLET, FILM COATED, EXTENDED RELEASE ORAL
Refills: 0 | Status: DISCONTINUED | OUTPATIENT
Start: 2025-01-01 | End: 2025-01-01

## 2025-01-01 RX ORDER — INSULIN GLARGINE-YFGN 100 [IU]/ML
12 INJECTION, SOLUTION SUBCUTANEOUS AT BEDTIME
Refills: 0 | Status: DISCONTINUED | OUTPATIENT
Start: 2025-01-01 | End: 2025-01-01

## 2025-01-01 RX ORDER — NITROGLYCERIN 20 MG/G
0.4 OINTMENT TOPICAL
Refills: 0 | Status: COMPLETED | OUTPATIENT
Start: 2025-01-01 | End: 2025-01-01

## 2025-01-01 RX ORDER — BUMETANIDE 1 MG/1
2 TABLET ORAL
Qty: 20 | Refills: 0 | Status: DISCONTINUED | OUTPATIENT
Start: 2025-01-01 | End: 2025-01-01

## 2025-01-01 RX ORDER — INSULIN LISPRO 100 U/ML
INJECTION, SOLUTION INTRAVENOUS; SUBCUTANEOUS AT BEDTIME
Refills: 0 | Status: DISCONTINUED | OUTPATIENT
Start: 2025-01-01 | End: 2025-01-01

## 2025-01-01 RX ORDER — ACETAMINOPHEN 500 MG/5ML
1000 LIQUID (ML) ORAL ONCE
Refills: 0 | Status: DISCONTINUED | OUTPATIENT
Start: 2025-01-01 | End: 2025-01-01

## 2025-01-01 RX ORDER — INSULIN GLARGINE-YFGN 100 [IU]/ML
22 INJECTION, SOLUTION SUBCUTANEOUS AT BEDTIME
Refills: 0 | Status: DISCONTINUED | OUTPATIENT
Start: 2025-01-01 | End: 2025-01-01

## 2025-01-01 RX ORDER — NITROGLYCERIN 20 MG/G
1 OINTMENT TOPICAL
Refills: 0 | DISCHARGE

## 2025-01-01 RX ORDER — INSULIN LISPRO 100 U/ML
12 INJECTION, SOLUTION INTRAVENOUS; SUBCUTANEOUS
Refills: 0 | Status: DISCONTINUED | OUTPATIENT
Start: 2025-01-01 | End: 2025-01-01

## 2025-01-01 RX ORDER — AMMONIUM LACTATE 12 %
1 LOTION (ML) TOPICAL ONCE
Refills: 0 | Status: COMPLETED | OUTPATIENT
Start: 2025-01-01 | End: 2025-01-01

## 2025-01-01 RX ORDER — SENNA 187 MG
2 TABLET ORAL AT BEDTIME
Refills: 0 | Status: DISCONTINUED | OUTPATIENT
Start: 2025-01-01 | End: 2025-01-01

## 2025-01-01 RX ADMIN — INSULIN LISPRO 1: 100 INJECTION, SOLUTION INTRAVENOUS; SUBCUTANEOUS at 09:05

## 2025-01-01 RX ADMIN — Medication 5 MILLIGRAM(S): at 11:20

## 2025-01-01 RX ADMIN — Medication 25 MILLIGRAM(S): at 05:08

## 2025-01-01 RX ADMIN — METOPROLOL SUCCINATE 75 MILLIGRAM(S): 50 TABLET, EXTENDED RELEASE ORAL at 05:05

## 2025-01-01 RX ADMIN — INSULIN GLARGINE-YFGN 9 UNIT(S): 100 INJECTION, SOLUTION SUBCUTANEOUS at 21:56

## 2025-01-01 RX ADMIN — INSULIN GLARGINE-YFGN 9 UNIT(S): 100 INJECTION, SOLUTION SUBCUTANEOUS at 22:30

## 2025-01-01 RX ADMIN — Medication 650 MILLIGRAM(S): at 21:00

## 2025-01-01 RX ADMIN — INSULIN LISPRO 9 UNIT(S): 100 INJECTION, SOLUTION INTRAVENOUS; SUBCUTANEOUS at 08:57

## 2025-01-01 RX ADMIN — ISOSORBDIE DINITRATE 20 MILLIGRAM(S): 30 TABLET ORAL at 16:22

## 2025-01-01 RX ADMIN — INSULIN LISPRO 10 UNIT(S): 100 INJECTION, SOLUTION INTRAVENOUS; SUBCUTANEOUS at 12:59

## 2025-01-01 RX ADMIN — RANOLAZINE 1000 MILLIGRAM(S): 1000 TABLET, FILM COATED, EXTENDED RELEASE ORAL at 05:07

## 2025-01-01 RX ADMIN — Medication 650 MILLIGRAM(S): at 03:12

## 2025-01-01 RX ADMIN — METOPROLOL SUCCINATE 75 MILLIGRAM(S): 50 TABLET, EXTENDED RELEASE ORAL at 05:33

## 2025-01-01 RX ADMIN — INSULIN LISPRO 2: 100 INJECTION, SOLUTION INTRAVENOUS; SUBCUTANEOUS at 08:58

## 2025-01-01 RX ADMIN — RIVAROXABAN 15 MILLIGRAM(S): 10 TABLET, FILM COATED ORAL at 17:36

## 2025-01-01 RX ADMIN — WHITE PETROLATUM 1 APPLICATION(S): 1 OINTMENT TOPICAL at 05:44

## 2025-01-01 RX ADMIN — NITROGLYCERIN 0.4 MILLIGRAM(S): 20 OINTMENT TOPICAL at 11:26

## 2025-01-01 RX ADMIN — ISOSORBDIE DINITRATE 40 MILLIGRAM(S): 30 TABLET ORAL at 05:07

## 2025-01-01 RX ADMIN — ISOSORBDIE DINITRATE 30 MILLIGRAM(S): 30 TABLET ORAL at 17:54

## 2025-01-01 RX ADMIN — LIDOCAINE HYDROCHLORIDE 1 PATCH: 20 JELLY TOPICAL at 08:15

## 2025-01-01 RX ADMIN — INSULIN LISPRO 5: 100 INJECTION, SOLUTION INTRAVENOUS; SUBCUTANEOUS at 17:49

## 2025-01-01 RX ADMIN — Medication 25 MILLIGRAM(S): at 14:33

## 2025-01-01 RX ADMIN — Medication 1000 MILLIGRAM(S): at 07:14

## 2025-01-01 RX ADMIN — WHITE PETROLATUM 1 APPLICATION(S): 1 OINTMENT TOPICAL at 18:03

## 2025-01-01 RX ADMIN — Medication 1 TABLET(S): at 11:20

## 2025-01-01 RX ADMIN — OXYCODONE HYDROCHLORIDE 2.5 MILLIGRAM(S): 30 TABLET ORAL at 22:36

## 2025-01-01 RX ADMIN — Medication 81 MILLIGRAM(S): at 11:26

## 2025-01-01 RX ADMIN — Medication 25 MILLIGRAM(S): at 05:33

## 2025-01-01 RX ADMIN — INSULIN LISPRO 2: 100 INJECTION, SOLUTION INTRAVENOUS; SUBCUTANEOUS at 09:56

## 2025-01-01 RX ADMIN — Medication 1 APPLICATION(S): at 08:57

## 2025-01-01 RX ADMIN — BUMETANIDE 5 MG/HR: 1 TABLET ORAL at 11:29

## 2025-01-01 RX ADMIN — Medication 650 MILLIGRAM(S): at 21:11

## 2025-01-01 RX ADMIN — BUMETANIDE 10 MG/HR: 1 TABLET ORAL at 22:18

## 2025-01-01 RX ADMIN — RIVAROXABAN 15 MILLIGRAM(S): 10 TABLET, FILM COATED ORAL at 17:22

## 2025-01-01 RX ADMIN — INSULIN LISPRO 1: 100 INJECTION, SOLUTION INTRAVENOUS; SUBCUTANEOUS at 08:56

## 2025-01-01 RX ADMIN — METOPROLOL SUCCINATE 75 MILLIGRAM(S): 50 TABLET, EXTENDED RELEASE ORAL at 06:53

## 2025-01-01 RX ADMIN — Medication 25 MILLIGRAM(S): at 18:04

## 2025-01-01 RX ADMIN — SODIUM CHLORIDE 300 MILLILITER(S): 3 INJECTION, SOLUTION INTRAVENOUS at 13:28

## 2025-01-01 RX ADMIN — SODIUM CHLORIDE 300 MILLILITER(S): 3 INJECTION, SOLUTION INTRAVENOUS at 05:23

## 2025-01-01 RX ADMIN — Medication 100 MILLIGRAM(S): at 05:07

## 2025-01-01 RX ADMIN — INSULIN LISPRO 12 UNIT(S): 100 INJECTION, SOLUTION INTRAVENOUS; SUBCUTANEOUS at 09:08

## 2025-01-01 RX ADMIN — INSULIN GLARGINE-YFGN 22 UNIT(S): 100 INJECTION, SOLUTION SUBCUTANEOUS at 22:02

## 2025-01-01 RX ADMIN — RANOLAZINE 500 MILLIGRAM(S): 1000 TABLET, FILM COATED, EXTENDED RELEASE ORAL at 05:58

## 2025-01-01 RX ADMIN — Medication 25 MILLIGRAM(S): at 17:58

## 2025-01-01 RX ADMIN — Medication 1 TABLET(S): at 11:41

## 2025-01-01 RX ADMIN — INSULIN LISPRO 8 UNIT(S): 100 INJECTION, SOLUTION INTRAVENOUS; SUBCUTANEOUS at 12:43

## 2025-01-01 RX ADMIN — Medication 100 MILLIGRAM(S): at 21:41

## 2025-01-01 RX ADMIN — Medication 81 MILLIGRAM(S): at 12:36

## 2025-01-01 RX ADMIN — Medication 81 MILLIGRAM(S): at 13:18

## 2025-01-01 RX ADMIN — RIVAROXABAN 15 MILLIGRAM(S): 10 TABLET, FILM COATED ORAL at 17:37

## 2025-01-01 RX ADMIN — RIVAROXABAN 15 MILLIGRAM(S): 10 TABLET, FILM COATED ORAL at 16:19

## 2025-01-01 RX ADMIN — INSULIN LISPRO 12 UNIT(S): 100 INJECTION, SOLUTION INTRAVENOUS; SUBCUTANEOUS at 12:59

## 2025-01-01 RX ADMIN — INSULIN LISPRO 9 UNIT(S): 100 INJECTION, SOLUTION INTRAVENOUS; SUBCUTANEOUS at 13:00

## 2025-01-01 RX ADMIN — Medication 650 MILLIGRAM(S): at 02:52

## 2025-01-01 RX ADMIN — BUMETANIDE 10 MG/HR: 1 TABLET ORAL at 20:12

## 2025-01-01 RX ADMIN — ISOSORBDIE DINITRATE 40 MILLIGRAM(S): 30 TABLET ORAL at 11:17

## 2025-01-01 RX ADMIN — INSULIN LISPRO 8 UNIT(S): 100 INJECTION, SOLUTION INTRAVENOUS; SUBCUTANEOUS at 17:30

## 2025-01-01 RX ADMIN — Medication 650 MILLIGRAM(S): at 13:11

## 2025-01-01 RX ADMIN — INSULIN LISPRO 10 UNIT(S): 100 INJECTION, SOLUTION INTRAVENOUS; SUBCUTANEOUS at 09:07

## 2025-01-01 RX ADMIN — BUMETANIDE 5 MG/HR: 1 TABLET ORAL at 17:32

## 2025-01-01 RX ADMIN — Medication 75 MILLIGRAM(S): at 22:40

## 2025-01-01 RX ADMIN — INSULIN LISPRO 1: 100 INJECTION, SOLUTION INTRAVENOUS; SUBCUTANEOUS at 22:29

## 2025-01-01 RX ADMIN — Medication 25 MILLIGRAM(S): at 13:33

## 2025-01-01 RX ADMIN — INSULIN LISPRO 3: 100 INJECTION, SOLUTION INTRAVENOUS; SUBCUTANEOUS at 17:40

## 2025-01-01 RX ADMIN — INSULIN LISPRO 10 UNIT(S): 100 INJECTION, SOLUTION INTRAVENOUS; SUBCUTANEOUS at 08:55

## 2025-01-01 RX ADMIN — OXYCODONE HYDROCHLORIDE 2.5 MILLIGRAM(S): 30 TABLET ORAL at 16:55

## 2025-01-01 RX ADMIN — RIVAROXABAN 15 MILLIGRAM(S): 10 TABLET, FILM COATED ORAL at 17:52

## 2025-01-01 RX ADMIN — INSULIN LISPRO 2: 100 INJECTION, SOLUTION INTRAVENOUS; SUBCUTANEOUS at 17:47

## 2025-01-01 RX ADMIN — INSULIN LISPRO 4: 100 INJECTION, SOLUTION INTRAVENOUS; SUBCUTANEOUS at 13:39

## 2025-01-01 RX ADMIN — RIVAROXABAN 15 MILLIGRAM(S): 10 TABLET, FILM COATED ORAL at 18:15

## 2025-01-01 RX ADMIN — ISOSORBDIE DINITRATE 30 MILLIGRAM(S): 30 TABLET ORAL at 12:59

## 2025-01-01 RX ADMIN — INSULIN LISPRO 2 UNIT(S): 100 INJECTION, SOLUTION INTRAVENOUS; SUBCUTANEOUS at 00:14

## 2025-01-01 RX ADMIN — INSULIN LISPRO 1: 100 INJECTION, SOLUTION INTRAVENOUS; SUBCUTANEOUS at 09:19

## 2025-01-01 RX ADMIN — BUMETANIDE 10 MG/HR: 1 TABLET ORAL at 11:55

## 2025-01-01 RX ADMIN — ISOSORBDIE DINITRATE 30 MILLIGRAM(S): 30 TABLET ORAL at 18:05

## 2025-01-01 RX ADMIN — OXYCODONE HYDROCHLORIDE 2.5 MILLIGRAM(S): 30 TABLET ORAL at 01:35

## 2025-01-01 RX ADMIN — INSULIN LISPRO 6 UNIT(S): 100 INJECTION, SOLUTION INTRAVENOUS; SUBCUTANEOUS at 17:50

## 2025-01-01 RX ADMIN — BUMETANIDE 10 MG/HR: 1 TABLET ORAL at 09:32

## 2025-01-01 RX ADMIN — METOPROLOL SUCCINATE 75 MILLIGRAM(S): 50 TABLET, EXTENDED RELEASE ORAL at 06:41

## 2025-01-01 RX ADMIN — METOPROLOL SUCCINATE 100 MILLIGRAM(S): 50 TABLET, EXTENDED RELEASE ORAL at 05:08

## 2025-01-01 RX ADMIN — BUMETANIDE 5 MG/HR: 1 TABLET ORAL at 21:30

## 2025-01-01 RX ADMIN — ISOSORBDIE DINITRATE 10 MILLIGRAM(S): 30 TABLET ORAL at 04:55

## 2025-01-01 RX ADMIN — SODIUM CHLORIDE 300 MILLILITER(S): 3 INJECTION, SOLUTION INTRAVENOUS at 21:24

## 2025-01-01 RX ADMIN — INSULIN LISPRO 1: 100 INJECTION, SOLUTION INTRAVENOUS; SUBCUTANEOUS at 08:48

## 2025-01-01 RX ADMIN — ISOSORBDIE DINITRATE 30 MILLIGRAM(S): 30 TABLET ORAL at 05:11

## 2025-01-01 RX ADMIN — Medication 25 MILLIGRAM(S): at 04:53

## 2025-01-01 RX ADMIN — INSULIN LISPRO 13 UNIT(S): 100 INJECTION, SOLUTION INTRAVENOUS; SUBCUTANEOUS at 17:46

## 2025-01-01 RX ADMIN — INSULIN LISPRO 2: 100 INJECTION, SOLUTION INTRAVENOUS; SUBCUTANEOUS at 09:26

## 2025-01-01 RX ADMIN — Medication 3 MILLIGRAM(S): at 22:32

## 2025-01-01 RX ADMIN — INSULIN LISPRO 3: 100 INJECTION, SOLUTION INTRAVENOUS; SUBCUTANEOUS at 17:38

## 2025-01-01 RX ADMIN — Medication 50 MILLIGRAM(S): at 16:17

## 2025-01-01 RX ADMIN — Medication 1 TABLET(S): at 12:19

## 2025-01-01 RX ADMIN — Medication 25 MILLIGRAM(S): at 22:07

## 2025-01-01 RX ADMIN — Medication 1 TABLET(S): at 11:49

## 2025-01-01 RX ADMIN — Medication 400 MILLIGRAM(S): at 06:44

## 2025-01-01 RX ADMIN — Medication 650 MILLIGRAM(S): at 21:42

## 2025-01-01 RX ADMIN — INSULIN GLARGINE-YFGN 15 UNIT(S): 100 INJECTION, SOLUTION SUBCUTANEOUS at 22:29

## 2025-01-01 RX ADMIN — INSULIN GLARGINE-YFGN 17 UNIT(S): 100 INJECTION, SOLUTION SUBCUTANEOUS at 21:58

## 2025-01-01 RX ADMIN — INSULIN GLARGINE-YFGN 18 UNIT(S): 100 INJECTION, SOLUTION SUBCUTANEOUS at 22:15

## 2025-01-01 RX ADMIN — RANOLAZINE 500 MILLIGRAM(S): 1000 TABLET, FILM COATED, EXTENDED RELEASE ORAL at 06:08

## 2025-01-01 RX ADMIN — INSULIN LISPRO 10 UNIT(S): 100 INJECTION, SOLUTION INTRAVENOUS; SUBCUTANEOUS at 18:22

## 2025-01-01 RX ADMIN — ISOSORBDIE DINITRATE 30 MILLIGRAM(S): 30 TABLET ORAL at 17:52

## 2025-01-01 RX ADMIN — BUMETANIDE 5 MG/HR: 1 TABLET ORAL at 21:15

## 2025-01-01 RX ADMIN — Medication 81 MILLIGRAM(S): at 11:17

## 2025-01-01 RX ADMIN — Medication 50 MILLIGRAM(S): at 21:19

## 2025-01-01 RX ADMIN — RANOLAZINE 1000 MILLIGRAM(S): 1000 TABLET, FILM COATED, EXTENDED RELEASE ORAL at 17:59

## 2025-01-01 RX ADMIN — BUMETANIDE 5 MG/HR: 1 TABLET ORAL at 22:23

## 2025-01-01 RX ADMIN — Medication 650 MILLIGRAM(S): at 21:58

## 2025-01-01 RX ADMIN — INSULIN GLARGINE-YFGN 15 UNIT(S): 100 INJECTION, SOLUTION SUBCUTANEOUS at 22:07

## 2025-01-01 RX ADMIN — ISOSORBDIE DINITRATE 30 MILLIGRAM(S): 30 TABLET ORAL at 06:10

## 2025-01-01 RX ADMIN — OXYCODONE HYDROCHLORIDE 2.5 MILLIGRAM(S): 30 TABLET ORAL at 23:37

## 2025-01-01 RX ADMIN — Medication 5 MILLIGRAM(S): at 16:52

## 2025-01-01 RX ADMIN — OXYCODONE HYDROCHLORIDE 2.5 MILLIGRAM(S): 30 TABLET ORAL at 01:01

## 2025-01-01 RX ADMIN — INSULIN LISPRO 9 UNIT(S): 100 INJECTION, SOLUTION INTRAVENOUS; SUBCUTANEOUS at 12:58

## 2025-01-01 RX ADMIN — ISOSORBDIE DINITRATE 20 MILLIGRAM(S): 30 TABLET ORAL at 12:14

## 2025-01-01 RX ADMIN — BUMETANIDE 5 MG/HR: 1 TABLET ORAL at 16:23

## 2025-01-01 RX ADMIN — Medication 1 TABLET(S): at 11:15

## 2025-01-01 RX ADMIN — INSULIN LISPRO 2: 100 INJECTION, SOLUTION INTRAVENOUS; SUBCUTANEOUS at 09:02

## 2025-01-01 RX ADMIN — RANOLAZINE 500 MILLIGRAM(S): 1000 TABLET, FILM COATED, EXTENDED RELEASE ORAL at 17:33

## 2025-01-01 RX ADMIN — Medication 81 MILLIGRAM(S): at 13:15

## 2025-01-01 RX ADMIN — ISOSORBDIE DINITRATE 40 MILLIGRAM(S): 30 TABLET ORAL at 17:17

## 2025-01-01 RX ADMIN — BUMETANIDE 5 MG/HR: 1 TABLET ORAL at 06:51

## 2025-01-01 RX ADMIN — Medication 2 TABLET(S): at 21:41

## 2025-01-01 RX ADMIN — ISOSORBDIE DINITRATE 10 MILLIGRAM(S): 30 TABLET ORAL at 11:33

## 2025-01-01 RX ADMIN — BUMETANIDE 5 MG/HR: 1 TABLET ORAL at 17:53

## 2025-01-01 RX ADMIN — METOPROLOL SUCCINATE 75 MILLIGRAM(S): 50 TABLET, EXTENDED RELEASE ORAL at 04:54

## 2025-01-01 RX ADMIN — Medication 100 MILLIGRAM(S): at 22:22

## 2025-01-01 RX ADMIN — INSULIN LISPRO 3: 100 INJECTION, SOLUTION INTRAVENOUS; SUBCUTANEOUS at 13:02

## 2025-01-01 RX ADMIN — Medication 1 TABLET(S): at 11:25

## 2025-01-01 RX ADMIN — INSULIN GLARGINE-YFGN 9 UNIT(S): 100 INJECTION, SOLUTION SUBCUTANEOUS at 21:41

## 2025-01-01 RX ADMIN — BUMETANIDE 10 MG/HR: 1 TABLET ORAL at 01:22

## 2025-01-01 RX ADMIN — WHITE PETROLATUM 1 APPLICATION(S): 1 OINTMENT TOPICAL at 17:21

## 2025-01-01 RX ADMIN — INSULIN LISPRO 2: 100 INJECTION, SOLUTION INTRAVENOUS; SUBCUTANEOUS at 12:56

## 2025-01-01 RX ADMIN — Medication 1 TABLET(S): at 12:57

## 2025-01-01 RX ADMIN — OXYCODONE HYDROCHLORIDE 2.5 MILLIGRAM(S): 30 TABLET ORAL at 17:32

## 2025-01-01 RX ADMIN — ISOSORBDIE DINITRATE 40 MILLIGRAM(S): 30 TABLET ORAL at 05:15

## 2025-01-01 RX ADMIN — INSULIN LISPRO 5: 100 INJECTION, SOLUTION INTRAVENOUS; SUBCUTANEOUS at 12:58

## 2025-01-01 RX ADMIN — INSULIN GLARGINE-YFGN 9 UNIT(S): 100 INJECTION, SOLUTION SUBCUTANEOUS at 21:47

## 2025-01-01 RX ADMIN — ISOSORBDIE DINITRATE 20 MILLIGRAM(S): 30 TABLET ORAL at 05:06

## 2025-01-01 RX ADMIN — WHITE PETROLATUM 1 APPLICATION(S): 1 OINTMENT TOPICAL at 18:15

## 2025-01-01 RX ADMIN — INSULIN LISPRO 9 UNIT(S): 100 INJECTION, SOLUTION INTRAVENOUS; SUBCUTANEOUS at 09:02

## 2025-01-01 RX ADMIN — INSULIN GLARGINE-YFGN 22 UNIT(S): 100 INJECTION, SOLUTION SUBCUTANEOUS at 21:47

## 2025-01-01 RX ADMIN — Medication 1 TABLET(S): at 13:49

## 2025-01-01 RX ADMIN — Medication 650 MILLIGRAM(S): at 13:07

## 2025-01-01 RX ADMIN — Medication 25 MILLIGRAM(S): at 05:31

## 2025-01-01 RX ADMIN — Medication 100 MILLIGRAM(S): at 05:37

## 2025-01-01 RX ADMIN — INSULIN LISPRO 9 UNIT(S): 100 INJECTION, SOLUTION INTRAVENOUS; SUBCUTANEOUS at 18:06

## 2025-01-01 RX ADMIN — RANOLAZINE 500 MILLIGRAM(S): 1000 TABLET, FILM COATED, EXTENDED RELEASE ORAL at 17:48

## 2025-01-01 RX ADMIN — ISOSORBDIE DINITRATE 30 MILLIGRAM(S): 30 TABLET ORAL at 12:30

## 2025-01-01 RX ADMIN — Medication 4 MILLIGRAM(S): at 07:18

## 2025-01-01 RX ADMIN — INSULIN GLARGINE-YFGN 15 UNIT(S): 100 INJECTION, SOLUTION SUBCUTANEOUS at 21:58

## 2025-01-01 RX ADMIN — RANOLAZINE 1000 MILLIGRAM(S): 1000 TABLET, FILM COATED, EXTENDED RELEASE ORAL at 17:06

## 2025-01-01 RX ADMIN — SODIUM CHLORIDE 300 MILLILITER(S): 3 INJECTION, SOLUTION INTRAVENOUS at 21:51

## 2025-01-01 RX ADMIN — INSULIN GLARGINE-YFGN 17 UNIT(S): 100 INJECTION, SOLUTION SUBCUTANEOUS at 21:30

## 2025-01-01 RX ADMIN — BUMETANIDE 5 MG/HR: 1 TABLET ORAL at 08:57

## 2025-01-01 RX ADMIN — INSULIN LISPRO 10 UNIT(S): 100 INJECTION, SOLUTION INTRAVENOUS; SUBCUTANEOUS at 13:07

## 2025-01-01 RX ADMIN — GABAPENTIN 100 MILLIGRAM(S): 400 CAPSULE ORAL at 03:43

## 2025-01-01 RX ADMIN — RANOLAZINE 500 MILLIGRAM(S): 1000 TABLET, FILM COATED, EXTENDED RELEASE ORAL at 17:54

## 2025-01-01 RX ADMIN — Medication 81 MILLIGRAM(S): at 11:33

## 2025-01-01 RX ADMIN — INSULIN LISPRO 1: 100 INJECTION, SOLUTION INTRAVENOUS; SUBCUTANEOUS at 01:14

## 2025-01-01 RX ADMIN — Medication 25 MILLIGRAM(S): at 05:58

## 2025-01-01 RX ADMIN — METOPROLOL SUCCINATE 75 MILLIGRAM(S): 50 TABLET, EXTENDED RELEASE ORAL at 05:49

## 2025-01-01 RX ADMIN — BUMETANIDE 5 MG/HR: 1 TABLET ORAL at 17:08

## 2025-01-01 RX ADMIN — METOPROLOL SUCCINATE 100 MILLIGRAM(S): 50 TABLET, EXTENDED RELEASE ORAL at 05:16

## 2025-01-01 RX ADMIN — INSULIN LISPRO 8 UNIT(S): 100 INJECTION, SOLUTION INTRAVENOUS; SUBCUTANEOUS at 17:46

## 2025-01-01 RX ADMIN — ISOSORBDIE DINITRATE 20 MILLIGRAM(S): 30 TABLET ORAL at 05:34

## 2025-01-01 RX ADMIN — Medication 650 MILLIGRAM(S): at 01:12

## 2025-01-01 RX ADMIN — RANOLAZINE 500 MILLIGRAM(S): 1000 TABLET, FILM COATED, EXTENDED RELEASE ORAL at 17:22

## 2025-01-01 RX ADMIN — Medication 1000 MILLIGRAM(S): at 05:48

## 2025-01-01 RX ADMIN — Medication 650 MILLIGRAM(S): at 05:48

## 2025-01-01 RX ADMIN — ISOSORBDIE DINITRATE 10 MILLIGRAM(S): 30 TABLET ORAL at 17:16

## 2025-01-01 RX ADMIN — INSULIN LISPRO 1: 100 INJECTION, SOLUTION INTRAVENOUS; SUBCUTANEOUS at 09:07

## 2025-01-01 RX ADMIN — BUMETANIDE 5 MG/HR: 1 TABLET ORAL at 09:45

## 2025-01-01 RX ADMIN — BUMETANIDE 10 MG/HR: 1 TABLET ORAL at 05:23

## 2025-01-01 RX ADMIN — INSULIN GLARGINE-YFGN 15 UNIT(S): 100 INJECTION, SOLUTION SUBCUTANEOUS at 03:10

## 2025-01-01 RX ADMIN — BUMETANIDE 10 MG/HR: 1 TABLET ORAL at 17:04

## 2025-01-01 RX ADMIN — Medication 650 MILLIGRAM(S): at 01:39

## 2025-01-01 RX ADMIN — POTASSIUM CHLORIDE, DEXTROSE MONOHYDRATE AND SODIUM CHLORIDE 100 MILLILITER(S): 150; 5; 900 INJECTION, SOLUTION INTRAVENOUS at 12:32

## 2025-01-01 RX ADMIN — ISOSORBDIE DINITRATE 10 MILLIGRAM(S): 30 TABLET ORAL at 11:16

## 2025-01-01 RX ADMIN — Medication 650 MILLIGRAM(S): at 05:57

## 2025-01-01 RX ADMIN — ISOSORBDIE DINITRATE 30 MILLIGRAM(S): 30 TABLET ORAL at 17:45

## 2025-01-01 RX ADMIN — Medication 81 MILLIGRAM(S): at 12:14

## 2025-01-01 RX ADMIN — INSULIN LISPRO 12 UNIT(S): 100 INJECTION, SOLUTION INTRAVENOUS; SUBCUTANEOUS at 17:49

## 2025-01-01 RX ADMIN — BUMETANIDE 10 MG/HR: 1 TABLET ORAL at 18:32

## 2025-01-01 RX ADMIN — INSULIN LISPRO 10 UNIT(S): 100 INJECTION, SOLUTION INTRAVENOUS; SUBCUTANEOUS at 09:12

## 2025-01-01 RX ADMIN — Medication 650 MILLIGRAM(S): at 21:12

## 2025-01-01 RX ADMIN — Medication 650 MILLIGRAM(S): at 21:15

## 2025-01-01 RX ADMIN — OXYCODONE HYDROCHLORIDE 2.5 MILLIGRAM(S): 30 TABLET ORAL at 17:15

## 2025-01-01 RX ADMIN — BUMETANIDE 5 MG/HR: 1 TABLET ORAL at 21:59

## 2025-01-01 RX ADMIN — LIDOCAINE HYDROCHLORIDE 1 PATCH: 20 JELLY TOPICAL at 12:24

## 2025-01-01 RX ADMIN — METOPROLOL SUCCINATE 75 MILLIGRAM(S): 50 TABLET, EXTENDED RELEASE ORAL at 06:04

## 2025-01-01 RX ADMIN — SODIUM CHLORIDE 300 MILLILITER(S): 3 INJECTION, SOLUTION INTRAVENOUS at 22:17

## 2025-01-01 RX ADMIN — Medication 650 MILLIGRAM(S): at 03:58

## 2025-01-01 RX ADMIN — METOPROLOL SUCCINATE 100 MILLIGRAM(S): 50 TABLET, EXTENDED RELEASE ORAL at 05:37

## 2025-01-01 RX ADMIN — Medication 650 MILLIGRAM(S): at 02:42

## 2025-01-01 RX ADMIN — BUMETANIDE 5 MG/HR: 1 TABLET ORAL at 21:11

## 2025-01-01 RX ADMIN — RIVAROXABAN 15 MILLIGRAM(S): 10 TABLET, FILM COATED ORAL at 17:11

## 2025-01-01 RX ADMIN — INSULIN GLARGINE-YFGN 9 UNIT(S): 100 INJECTION, SOLUTION SUBCUTANEOUS at 22:18

## 2025-01-01 RX ADMIN — INSULIN LISPRO 9 UNIT(S): 100 INJECTION, SOLUTION INTRAVENOUS; SUBCUTANEOUS at 13:39

## 2025-01-01 RX ADMIN — Medication 100 MILLIGRAM(S): at 21:21

## 2025-01-01 RX ADMIN — INSULIN LISPRO 6 UNIT(S): 100 INJECTION, SOLUTION INTRAVENOUS; SUBCUTANEOUS at 18:12

## 2025-01-01 RX ADMIN — Medication 650 MILLIGRAM(S): at 04:54

## 2025-01-01 RX ADMIN — POLYETHYLENE GLYCOL 3350 17 GRAM(S): 17 POWDER, FOR SOLUTION ORAL at 11:29

## 2025-01-01 RX ADMIN — Medication 2 TABLET(S): at 21:56

## 2025-01-01 RX ADMIN — WHITE PETROLATUM 1 APPLICATION(S): 1 OINTMENT TOPICAL at 18:02

## 2025-01-01 RX ADMIN — Medication 25 MILLIGRAM(S): at 06:08

## 2025-01-01 RX ADMIN — INSULIN LISPRO 13 UNIT(S): 100 INJECTION, SOLUTION INTRAVENOUS; SUBCUTANEOUS at 08:58

## 2025-01-01 RX ADMIN — RANOLAZINE 1000 MILLIGRAM(S): 1000 TABLET, FILM COATED, EXTENDED RELEASE ORAL at 18:13

## 2025-01-01 RX ADMIN — INSULIN GLARGINE-YFGN 12 UNIT(S): 100 INJECTION, SOLUTION SUBCUTANEOUS at 22:23

## 2025-01-01 RX ADMIN — RIVAROXABAN 15 MILLIGRAM(S): 10 TABLET, FILM COATED ORAL at 17:33

## 2025-01-01 RX ADMIN — ISOSORBDIE DINITRATE 10 MILLIGRAM(S): 30 TABLET ORAL at 16:18

## 2025-01-01 RX ADMIN — Medication 81 MILLIGRAM(S): at 12:19

## 2025-01-01 RX ADMIN — Medication 25 MILLIGRAM(S): at 05:47

## 2025-01-01 RX ADMIN — NITROGLYCERIN 0.4 MILLIGRAM(S): 20 OINTMENT TOPICAL at 23:50

## 2025-01-01 RX ADMIN — INSULIN LISPRO 4: 100 INJECTION, SOLUTION INTRAVENOUS; SUBCUTANEOUS at 12:43

## 2025-01-01 RX ADMIN — Medication 650 MILLIGRAM(S): at 20:16

## 2025-01-01 RX ADMIN — INSULIN LISPRO 6 UNIT(S): 100 INJECTION, SOLUTION INTRAVENOUS; SUBCUTANEOUS at 09:56

## 2025-01-01 RX ADMIN — RANOLAZINE 1000 MILLIGRAM(S): 1000 TABLET, FILM COATED, EXTENDED RELEASE ORAL at 05:15

## 2025-01-01 RX ADMIN — INSULIN LISPRO 6 UNIT(S): 100 INJECTION, SOLUTION INTRAVENOUS; SUBCUTANEOUS at 13:25

## 2025-01-01 RX ADMIN — INSULIN LISPRO 8 UNIT(S): 100 INJECTION, SOLUTION INTRAVENOUS; SUBCUTANEOUS at 08:42

## 2025-01-01 RX ADMIN — Medication 100 MILLIGRAM(S): at 13:27

## 2025-01-01 RX ADMIN — RIVAROXABAN 15 MILLIGRAM(S): 10 TABLET, FILM COATED ORAL at 17:15

## 2025-01-01 RX ADMIN — Medication 1 TABLET(S): at 12:14

## 2025-01-01 RX ADMIN — WHITE PETROLATUM 1 APPLICATION(S): 1 OINTMENT TOPICAL at 05:12

## 2025-01-01 RX ADMIN — WHITE PETROLATUM 1 APPLICATION(S): 1 OINTMENT TOPICAL at 08:56

## 2025-01-01 RX ADMIN — ISOSORBDIE DINITRATE 20 MILLIGRAM(S): 30 TABLET ORAL at 05:31

## 2025-01-01 RX ADMIN — INSULIN LISPRO 2: 100 INJECTION, SOLUTION INTRAVENOUS; SUBCUTANEOUS at 08:56

## 2025-01-01 RX ADMIN — ISOSORBDIE DINITRATE 10 MILLIGRAM(S): 30 TABLET ORAL at 11:34

## 2025-01-01 RX ADMIN — Medication 100 MILLIGRAM(S): at 05:32

## 2025-01-01 RX ADMIN — Medication 50 MILLIGRAM(S): at 05:37

## 2025-01-01 RX ADMIN — ISOSORBDIE DINITRATE 10 MILLIGRAM(S): 30 TABLET ORAL at 06:54

## 2025-01-01 RX ADMIN — Medication 650 MILLIGRAM(S): at 21:40

## 2025-01-01 RX ADMIN — Medication 100 MILLIGRAM(S): at 06:10

## 2025-01-01 RX ADMIN — INSULIN LISPRO 1: 100 INJECTION, SOLUTION INTRAVENOUS; SUBCUTANEOUS at 21:12

## 2025-01-01 RX ADMIN — RANOLAZINE 1000 MILLIGRAM(S): 1000 TABLET, FILM COATED, EXTENDED RELEASE ORAL at 05:37

## 2025-01-01 RX ADMIN — Medication 650 MILLIGRAM(S): at 06:54

## 2025-01-01 RX ADMIN — INSULIN GLARGINE-YFGN 15 UNIT(S): 100 INJECTION, SOLUTION SUBCUTANEOUS at 22:40

## 2025-01-01 RX ADMIN — SODIUM CHLORIDE 300 MILLILITER(S): 3 INJECTION, SOLUTION INTRAVENOUS at 13:47

## 2025-01-01 RX ADMIN — INSULIN LISPRO 8 UNIT(S): 100 INJECTION, SOLUTION INTRAVENOUS; SUBCUTANEOUS at 08:56

## 2025-01-01 RX ADMIN — BUMETANIDE 5 MG/HR: 1 TABLET ORAL at 22:16

## 2025-01-01 RX ADMIN — BUMETANIDE 10 MG/HR: 1 TABLET ORAL at 05:57

## 2025-01-01 RX ADMIN — WHITE PETROLATUM 1 APPLICATION(S): 1 OINTMENT TOPICAL at 05:16

## 2025-01-01 RX ADMIN — RANOLAZINE 500 MILLIGRAM(S): 1000 TABLET, FILM COATED, EXTENDED RELEASE ORAL at 18:04

## 2025-01-01 RX ADMIN — ISOSORBDIE DINITRATE 40 MILLIGRAM(S): 30 TABLET ORAL at 17:00

## 2025-01-01 RX ADMIN — BUMETANIDE 5 MG/HR: 1 TABLET ORAL at 09:09

## 2025-01-01 RX ADMIN — INSULIN LISPRO 8 UNIT(S): 100 INJECTION, SOLUTION INTRAVENOUS; SUBCUTANEOUS at 17:38

## 2025-01-01 RX ADMIN — ISOSORBDIE DINITRATE 30 MILLIGRAM(S): 30 TABLET ORAL at 18:14

## 2025-01-01 RX ADMIN — Medication 40 MILLIEQUIVALENT(S): at 08:58

## 2025-01-01 RX ADMIN — ISOSORBDIE DINITRATE 20 MILLIGRAM(S): 30 TABLET ORAL at 12:37

## 2025-01-01 RX ADMIN — Medication 1000 MILLIGRAM(S): at 07:30

## 2025-01-01 RX ADMIN — ISOSORBDIE DINITRATE 20 MILLIGRAM(S): 30 TABLET ORAL at 05:44

## 2025-01-01 RX ADMIN — INSULIN LISPRO 1: 100 INJECTION, SOLUTION INTRAVENOUS; SUBCUTANEOUS at 21:56

## 2025-01-01 RX ADMIN — RANOLAZINE 500 MILLIGRAM(S): 1000 TABLET, FILM COATED, EXTENDED RELEASE ORAL at 05:11

## 2025-01-01 RX ADMIN — ISOSORBDIE DINITRATE 40 MILLIGRAM(S): 30 TABLET ORAL at 21:40

## 2025-01-01 RX ADMIN — WHITE PETROLATUM 1 APPLICATION(S): 1 OINTMENT TOPICAL at 00:06

## 2025-01-01 RX ADMIN — NITROGLYCERIN 0.4 MILLIGRAM(S): 20 OINTMENT TOPICAL at 08:34

## 2025-01-01 RX ADMIN — INSULIN LISPRO 9 UNIT(S): 100 INJECTION, SOLUTION INTRAVENOUS; SUBCUTANEOUS at 17:34

## 2025-01-01 RX ADMIN — INSULIN LISPRO 9 UNIT(S): 100 INJECTION, SOLUTION INTRAVENOUS; SUBCUTANEOUS at 08:54

## 2025-01-01 RX ADMIN — BUMETANIDE 10 MG/HR: 1 TABLET ORAL at 06:16

## 2025-01-01 RX ADMIN — ISOSORBDIE DINITRATE 30 MILLIGRAM(S): 30 TABLET ORAL at 17:21

## 2025-01-01 RX ADMIN — Medication 75 MILLIGRAM(S): at 14:38

## 2025-01-01 RX ADMIN — METOPROLOL SUCCINATE 75 MILLIGRAM(S): 50 TABLET, EXTENDED RELEASE ORAL at 05:58

## 2025-01-01 RX ADMIN — METOPROLOL SUCCINATE 75 MILLIGRAM(S): 50 TABLET, EXTENDED RELEASE ORAL at 06:58

## 2025-01-01 RX ADMIN — INSULIN LISPRO 8 UNIT(S): 100 INJECTION, SOLUTION INTRAVENOUS; SUBCUTANEOUS at 09:05

## 2025-01-01 RX ADMIN — Medication 81 MILLIGRAM(S): at 11:27

## 2025-01-01 RX ADMIN — RIVAROXABAN 15 MILLIGRAM(S): 10 TABLET, FILM COATED ORAL at 17:06

## 2025-01-01 RX ADMIN — Medication 650 MILLIGRAM(S): at 20:46

## 2025-01-01 RX ADMIN — ISOSORBDIE DINITRATE 10 MILLIGRAM(S): 30 TABLET ORAL at 05:47

## 2025-01-01 RX ADMIN — Medication 650 MILLIGRAM(S): at 08:01

## 2025-01-01 RX ADMIN — RANOLAZINE 500 MILLIGRAM(S): 1000 TABLET, FILM COATED, EXTENDED RELEASE ORAL at 06:58

## 2025-01-01 RX ADMIN — SODIUM CHLORIDE 70 MILLILITER(S): 9 INJECTION, SOLUTION INTRAVENOUS at 22:43

## 2025-01-01 RX ADMIN — ISOSORBDIE DINITRATE 30 MILLIGRAM(S): 30 TABLET ORAL at 05:58

## 2025-01-01 RX ADMIN — OXYCODONE HYDROCHLORIDE 2.5 MILLIGRAM(S): 30 TABLET ORAL at 22:06

## 2025-01-01 RX ADMIN — LIDOCAINE HYDROCHLORIDE 1 PATCH: 20 JELLY TOPICAL at 00:15

## 2025-01-01 RX ADMIN — Medication 650 MILLIGRAM(S): at 02:39

## 2025-01-01 RX ADMIN — RIVAROXABAN 15 MILLIGRAM(S): 10 TABLET, FILM COATED ORAL at 17:58

## 2025-01-01 RX ADMIN — Medication 75 MILLIGRAM(S): at 06:03

## 2025-01-01 RX ADMIN — Medication 25 MILLIGRAM(S): at 06:04

## 2025-01-01 RX ADMIN — WHITE PETROLATUM 1 APPLICATION(S): 1 OINTMENT TOPICAL at 17:34

## 2025-01-01 RX ADMIN — RANOLAZINE 1000 MILLIGRAM(S): 1000 TABLET, FILM COATED, EXTENDED RELEASE ORAL at 05:33

## 2025-01-01 RX ADMIN — Medication 25 MILLIGRAM(S): at 05:06

## 2025-01-01 RX ADMIN — WHITE PETROLATUM 1 APPLICATION(S): 1 OINTMENT TOPICAL at 17:18

## 2025-01-01 RX ADMIN — ISOSORBDIE DINITRATE 10 MILLIGRAM(S): 30 TABLET ORAL at 17:11

## 2025-01-01 RX ADMIN — INSULIN LISPRO 6 UNIT(S): 100 INJECTION, SOLUTION INTRAVENOUS; SUBCUTANEOUS at 09:18

## 2025-01-01 RX ADMIN — Medication 650 MILLIGRAM(S): at 02:28

## 2025-01-01 RX ADMIN — ISOSORBDIE DINITRATE 20 MILLIGRAM(S): 30 TABLET ORAL at 11:41

## 2025-01-01 RX ADMIN — INSULIN LISPRO 6 UNIT(S): 100 INJECTION, SOLUTION INTRAVENOUS; SUBCUTANEOUS at 12:57

## 2025-01-01 RX ADMIN — Medication 81 MILLIGRAM(S): at 12:42

## 2025-01-01 RX ADMIN — Medication 100 MILLIGRAM(S): at 21:56

## 2025-01-01 RX ADMIN — BUMETANIDE 5 MG/HR: 1 TABLET ORAL at 08:56

## 2025-01-01 RX ADMIN — WHITE PETROLATUM 1 APPLICATION(S): 1 OINTMENT TOPICAL at 06:09

## 2025-01-01 RX ADMIN — INSULIN LISPRO 9 UNIT(S): 100 INJECTION, SOLUTION INTRAVENOUS; SUBCUTANEOUS at 17:23

## 2025-01-01 RX ADMIN — RIVAROXABAN 15 MILLIGRAM(S): 10 TABLET, FILM COATED ORAL at 17:00

## 2025-01-01 RX ADMIN — Medication 4 MILLIGRAM(S): at 19:01

## 2025-01-01 RX ADMIN — BUMETANIDE 5 MG/HR: 1 TABLET ORAL at 20:03

## 2025-01-01 RX ADMIN — ISOSORBDIE DINITRATE 30 MILLIGRAM(S): 30 TABLET ORAL at 05:33

## 2025-01-01 RX ADMIN — OXYCODONE HYDROCHLORIDE 2.5 MILLIGRAM(S): 30 TABLET ORAL at 00:43

## 2025-01-01 RX ADMIN — ISOSORBDIE DINITRATE 10 MILLIGRAM(S): 30 TABLET ORAL at 12:45

## 2025-01-01 RX ADMIN — ISOSORBDIE DINITRATE 30 MILLIGRAM(S): 30 TABLET ORAL at 06:58

## 2025-01-01 RX ADMIN — INSULIN LISPRO 8 UNIT(S): 100 INJECTION, SOLUTION INTRAVENOUS; SUBCUTANEOUS at 13:02

## 2025-01-01 RX ADMIN — Medication 650 MILLIGRAM(S): at 13:39

## 2025-01-01 RX ADMIN — BUMETANIDE 10 MG/HR: 1 TABLET ORAL at 12:49

## 2025-01-01 RX ADMIN — METOPROLOL SUCCINATE 75 MILLIGRAM(S): 50 TABLET, EXTENDED RELEASE ORAL at 05:48

## 2025-01-01 RX ADMIN — Medication 100 MILLIGRAM(S): at 13:47

## 2025-01-01 RX ADMIN — WHITE PETROLATUM 1 APPLICATION(S): 1 OINTMENT TOPICAL at 06:04

## 2025-01-01 RX ADMIN — INSULIN LISPRO 3: 100 INJECTION, SOLUTION INTRAVENOUS; SUBCUTANEOUS at 12:58

## 2025-01-01 RX ADMIN — Medication 650 MILLIGRAM(S): at 05:06

## 2025-01-01 RX ADMIN — ISOSORBDIE DINITRATE 20 MILLIGRAM(S): 30 TABLET ORAL at 11:44

## 2025-01-01 RX ADMIN — Medication 25 MILLIGRAM(S): at 06:54

## 2025-01-01 RX ADMIN — INSULIN LISPRO 10 UNIT(S): 100 INJECTION, SOLUTION INTRAVENOUS; SUBCUTANEOUS at 17:41

## 2025-01-01 RX ADMIN — METOPROLOL SUCCINATE 75 MILLIGRAM(S): 50 TABLET, EXTENDED RELEASE ORAL at 05:41

## 2025-01-01 RX ADMIN — WHITE PETROLATUM 1 APPLICATION(S): 1 OINTMENT TOPICAL at 17:54

## 2025-01-01 RX ADMIN — Medication 81 MILLIGRAM(S): at 12:38

## 2025-01-01 RX ADMIN — INSULIN LISPRO 6 UNIT(S): 100 INJECTION, SOLUTION INTRAVENOUS; SUBCUTANEOUS at 12:55

## 2025-01-01 RX ADMIN — RANOLAZINE 1000 MILLIGRAM(S): 1000 TABLET, FILM COATED, EXTENDED RELEASE ORAL at 06:11

## 2025-01-01 RX ADMIN — INSULIN LISPRO 5: 100 INJECTION, SOLUTION INTRAVENOUS; SUBCUTANEOUS at 12:55

## 2025-01-01 RX ADMIN — Medication 650 MILLIGRAM(S): at 20:36

## 2025-01-01 RX ADMIN — Medication 25 MILLIGRAM(S): at 22:24

## 2025-01-01 RX ADMIN — INSULIN LISPRO 8 UNIT(S): 100 INJECTION, SOLUTION INTRAVENOUS; SUBCUTANEOUS at 12:51

## 2025-01-01 RX ADMIN — Medication 1 TABLET(S): at 11:29

## 2025-01-01 RX ADMIN — INSULIN LISPRO 5: 100 INJECTION, SOLUTION INTRAVENOUS; SUBCUTANEOUS at 12:44

## 2025-01-01 RX ADMIN — INSULIN LISPRO 13 UNIT(S): 100 INJECTION, SOLUTION INTRAVENOUS; SUBCUTANEOUS at 13:32

## 2025-01-01 RX ADMIN — Medication 25 MILLIGRAM(S): at 05:57

## 2025-01-01 RX ADMIN — Medication 650 MILLIGRAM(S): at 22:10

## 2025-01-01 RX ADMIN — SODIUM CHLORIDE 300 MILLILITER(S): 3 INJECTION, SOLUTION INTRAVENOUS at 06:36

## 2025-01-01 RX ADMIN — Medication 25 MILLIGRAM(S): at 05:44

## 2025-01-01 RX ADMIN — BUMETANIDE 5 MG/HR: 1 TABLET ORAL at 05:10

## 2025-01-01 RX ADMIN — INSULIN LISPRO 1: 100 INJECTION, SOLUTION INTRAVENOUS; SUBCUTANEOUS at 12:59

## 2025-01-01 RX ADMIN — RIVAROXABAN 15 MILLIGRAM(S): 10 TABLET, FILM COATED ORAL at 17:54

## 2025-01-01 RX ADMIN — Medication 650 MILLIGRAM(S): at 21:30

## 2025-01-01 RX ADMIN — INSULIN LISPRO 4: 100 INJECTION, SOLUTION INTRAVENOUS; SUBCUTANEOUS at 13:09

## 2025-01-01 RX ADMIN — ISOSORBDIE DINITRATE 30 MILLIGRAM(S): 30 TABLET ORAL at 12:39

## 2025-01-01 RX ADMIN — INSULIN LISPRO 13 UNIT(S): 100 INJECTION, SOLUTION INTRAVENOUS; SUBCUTANEOUS at 12:44

## 2025-01-01 RX ADMIN — METOPROLOL SUCCINATE 75 MILLIGRAM(S): 50 TABLET, EXTENDED RELEASE ORAL at 06:35

## 2025-01-01 RX ADMIN — METOPROLOL SUCCINATE 75 MILLIGRAM(S): 50 TABLET, EXTENDED RELEASE ORAL at 05:10

## 2025-01-01 RX ADMIN — Medication 25 MILLIGRAM(S): at 05:37

## 2025-01-01 RX ADMIN — INSULIN GLARGINE-YFGN 12 UNIT(S): 100 INJECTION, SOLUTION SUBCUTANEOUS at 21:58

## 2025-01-01 RX ADMIN — INSULIN LISPRO 2: 100 INJECTION, SOLUTION INTRAVENOUS; SUBCUTANEOUS at 22:46

## 2025-01-01 RX ADMIN — Medication 1 TABLET(S): at 12:31

## 2025-01-01 RX ADMIN — INSULIN LISPRO 1: 100 INJECTION, SOLUTION INTRAVENOUS; SUBCUTANEOUS at 08:55

## 2025-01-01 RX ADMIN — ISOSORBDIE DINITRATE 40 MILLIGRAM(S): 30 TABLET ORAL at 13:27

## 2025-01-01 RX ADMIN — INSULIN LISPRO 8 UNIT(S): 100 INJECTION, SOLUTION INTRAVENOUS; SUBCUTANEOUS at 18:05

## 2025-01-01 RX ADMIN — ISOSORBDIE DINITRATE 30 MILLIGRAM(S): 30 TABLET ORAL at 17:32

## 2025-01-01 RX ADMIN — BUMETANIDE 5 MG/HR: 1 TABLET ORAL at 18:31

## 2025-01-01 RX ADMIN — INSULIN LISPRO 10 UNIT(S): 100 INJECTION, SOLUTION INTRAVENOUS; SUBCUTANEOUS at 17:36

## 2025-01-01 RX ADMIN — WHITE PETROLATUM 1 APPLICATION(S): 1 OINTMENT TOPICAL at 18:04

## 2025-01-01 RX ADMIN — Medication 25 MILLIGRAM(S): at 05:11

## 2025-01-01 RX ADMIN — Medication 650 MILLIGRAM(S): at 03:25

## 2025-01-01 RX ADMIN — Medication 25 MILLIGRAM(S): at 18:03

## 2025-01-01 RX ADMIN — Medication 81 MILLIGRAM(S): at 11:49

## 2025-01-01 RX ADMIN — INSULIN LISPRO 12 UNIT(S): 100 INJECTION, SOLUTION INTRAVENOUS; SUBCUTANEOUS at 17:52

## 2025-01-01 RX ADMIN — Medication 25 MILLIGRAM(S): at 06:58

## 2025-01-01 RX ADMIN — INSULIN LISPRO 1: 100 INJECTION, SOLUTION INTRAVENOUS; SUBCUTANEOUS at 13:00

## 2025-01-01 RX ADMIN — RANOLAZINE 500 MILLIGRAM(S): 1000 TABLET, FILM COATED, EXTENDED RELEASE ORAL at 05:44

## 2025-01-01 RX ADMIN — Medication 25 MILLIGRAM(S): at 17:23

## 2025-01-01 RX ADMIN — INSULIN LISPRO 2: 100 INJECTION, SOLUTION INTRAVENOUS; SUBCUTANEOUS at 17:48

## 2025-01-01 RX ADMIN — SODIUM CHLORIDE 300 MILLILITER(S): 3 INJECTION, SOLUTION INTRAVENOUS at 06:04

## 2025-01-01 RX ADMIN — SODIUM CHLORIDE 300 MILLILITER(S): 3 INJECTION, SOLUTION INTRAVENOUS at 15:31

## 2025-01-01 RX ADMIN — INSULIN LISPRO 1: 100 INJECTION, SOLUTION INTRAVENOUS; SUBCUTANEOUS at 18:00

## 2025-01-01 RX ADMIN — WHITE PETROLATUM 1 APPLICATION(S): 1 OINTMENT TOPICAL at 05:32

## 2025-01-01 RX ADMIN — INSULIN LISPRO 2: 100 INJECTION, SOLUTION INTRAVENOUS; SUBCUTANEOUS at 18:12

## 2025-01-01 RX ADMIN — RANOLAZINE 1000 MILLIGRAM(S): 1000 TABLET, FILM COATED, EXTENDED RELEASE ORAL at 17:52

## 2025-01-01 RX ADMIN — Medication 1 TABLET(S): at 12:38

## 2025-01-01 RX ADMIN — Medication 25 MILLIGRAM(S): at 05:12

## 2025-01-01 RX ADMIN — Medication 650 MILLIGRAM(S): at 03:16

## 2025-01-01 RX ADMIN — INSULIN GLARGINE-YFGN 22 UNIT(S): 100 INJECTION, SOLUTION SUBCUTANEOUS at 21:16

## 2025-01-01 RX ADMIN — SODIUM CHLORIDE 300 MILLILITER(S): 3 INJECTION, SOLUTION INTRAVENOUS at 15:57

## 2025-01-01 RX ADMIN — ISOSORBDIE DINITRATE 30 MILLIGRAM(S): 30 TABLET ORAL at 12:57

## 2025-01-01 RX ADMIN — Medication 650 MILLIGRAM(S): at 02:46

## 2025-01-01 RX ADMIN — ISOSORBDIE DINITRATE 10 MILLIGRAM(S): 30 TABLET ORAL at 05:57

## 2025-01-01 RX ADMIN — Medication 81 MILLIGRAM(S): at 13:23

## 2025-01-01 RX ADMIN — Medication 650 MILLIGRAM(S): at 22:00

## 2025-01-01 RX ADMIN — Medication 1 TABLET(S): at 12:36

## 2025-01-01 RX ADMIN — Medication 25 MILLIGRAM(S): at 17:20

## 2025-01-01 RX ADMIN — ISOSORBDIE DINITRATE 10 MILLIGRAM(S): 30 TABLET ORAL at 16:53

## 2025-01-01 RX ADMIN — Medication 20 MILLIEQUIVALENT(S): at 13:48

## 2025-01-01 RX ADMIN — INSULIN LISPRO 3: 100 INJECTION, SOLUTION INTRAVENOUS; SUBCUTANEOUS at 12:59

## 2025-01-01 RX ADMIN — Medication 650 MILLIGRAM(S): at 13:04

## 2025-01-01 RX ADMIN — INSULIN LISPRO 2: 100 INJECTION, SOLUTION INTRAVENOUS; SUBCUTANEOUS at 08:55

## 2025-01-01 RX ADMIN — Medication 1 TABLET(S): at 13:00

## 2025-01-01 RX ADMIN — INSULIN LISPRO 6 UNIT(S): 100 INJECTION, SOLUTION INTRAVENOUS; SUBCUTANEOUS at 13:14

## 2025-01-01 RX ADMIN — Medication 650 MILLIGRAM(S): at 08:30

## 2025-01-01 RX ADMIN — NITROGLYCERIN 0.4 MILLIGRAM(S): 20 OINTMENT TOPICAL at 12:13

## 2025-01-01 RX ADMIN — BUMETANIDE 10 MG/HR: 1 TABLET ORAL at 06:48

## 2025-01-01 RX ADMIN — WHITE PETROLATUM 1 APPLICATION(S): 1 OINTMENT TOPICAL at 17:09

## 2025-01-01 RX ADMIN — Medication 400 MILLIGRAM(S): at 07:01

## 2025-01-01 RX ADMIN — OXYCODONE HYDROCHLORIDE 2.5 MILLIGRAM(S): 30 TABLET ORAL at 00:35

## 2025-01-01 RX ADMIN — Medication 650 MILLIGRAM(S): at 02:55

## 2025-01-01 RX ADMIN — Medication 81 MILLIGRAM(S): at 11:44

## 2025-01-01 RX ADMIN — Medication 40 MILLIEQUIVALENT(S): at 13:43

## 2025-01-01 RX ADMIN — Medication 81 MILLIGRAM(S): at 12:58

## 2025-01-01 RX ADMIN — SODIUM CHLORIDE 300 MILLILITER(S): 3 INJECTION, SOLUTION INTRAVENOUS at 17:23

## 2025-01-01 RX ADMIN — METOPROLOL SUCCINATE 75 MILLIGRAM(S): 50 TABLET, EXTENDED RELEASE ORAL at 06:10

## 2025-01-01 RX ADMIN — Medication 100 MILLIGRAM(S): at 13:15

## 2025-01-01 RX ADMIN — ISOSORBDIE DINITRATE 10 MILLIGRAM(S): 30 TABLET ORAL at 11:26

## 2025-01-01 RX ADMIN — INSULIN LISPRO 6 UNIT(S): 100 INJECTION, SOLUTION INTRAVENOUS; SUBCUTANEOUS at 09:06

## 2025-01-01 RX ADMIN — ISOSORBDIE DINITRATE 40 MILLIGRAM(S): 30 TABLET ORAL at 11:27

## 2025-01-01 RX ADMIN — BUMETANIDE 5 MG/HR: 1 TABLET ORAL at 17:41

## 2025-01-01 RX ADMIN — INSULIN LISPRO 6 UNIT(S): 100 INJECTION, SOLUTION INTRAVENOUS; SUBCUTANEOUS at 08:47

## 2025-01-01 RX ADMIN — Medication 20 MILLIEQUIVALENT(S): at 13:01

## 2025-01-01 RX ADMIN — NITROGLYCERIN 0.4 MILLIGRAM(S): 20 OINTMENT TOPICAL at 16:01

## 2025-01-01 RX ADMIN — WHITE PETROLATUM 1 APPLICATION(S): 1 OINTMENT TOPICAL at 05:08

## 2025-01-01 RX ADMIN — RIVAROXABAN 15 MILLIGRAM(S): 10 TABLET, FILM COATED ORAL at 17:18

## 2025-01-01 RX ADMIN — WHITE PETROLATUM 1 APPLICATION(S): 1 OINTMENT TOPICAL at 05:38

## 2025-01-01 RX ADMIN — INSULIN LISPRO 2: 100 INJECTION, SOLUTION INTRAVENOUS; SUBCUTANEOUS at 17:37

## 2025-01-01 RX ADMIN — ISOSORBDIE DINITRATE 20 MILLIGRAM(S): 30 TABLET ORAL at 17:09

## 2025-01-01 RX ADMIN — Medication 400 MILLIGRAM(S): at 05:18

## 2025-01-01 RX ADMIN — SODIUM CHLORIDE 300 MILLILITER(S): 3 INJECTION, SOLUTION INTRAVENOUS at 06:09

## 2025-01-01 RX ADMIN — BUMETANIDE 10 MG/HR: 1 TABLET ORAL at 05:07

## 2025-01-01 RX ADMIN — ISOSORBDIE DINITRATE 10 MILLIGRAM(S): 30 TABLET ORAL at 05:50

## 2025-01-01 RX ADMIN — Medication 25 MILLIGRAM(S): at 17:52

## 2025-01-01 RX ADMIN — INSULIN LISPRO 8 UNIT(S): 100 INJECTION, SOLUTION INTRAVENOUS; SUBCUTANEOUS at 08:55

## 2025-01-01 RX ADMIN — Medication 650 MILLIGRAM(S): at 17:15

## 2025-01-01 RX ADMIN — INSULIN LISPRO 2: 100 INJECTION, SOLUTION INTRAVENOUS; SUBCUTANEOUS at 09:08

## 2025-01-01 RX ADMIN — Medication 25 MILLIGRAM(S): at 12:31

## 2025-01-01 RX ADMIN — INSULIN LISPRO 1: 100 INJECTION, SOLUTION INTRAVENOUS; SUBCUTANEOUS at 17:31

## 2025-01-01 RX ADMIN — Medication 50 MILLIGRAM(S): at 05:58

## 2025-01-01 RX ADMIN — INSULIN LISPRO 3: 100 INJECTION, SOLUTION INTRAVENOUS; SUBCUTANEOUS at 18:05

## 2025-01-01 RX ADMIN — Medication 100 MILLIGRAM(S): at 13:26

## 2025-01-01 RX ADMIN — METOPROLOL SUCCINATE 75 MILLIGRAM(S): 50 TABLET, EXTENDED RELEASE ORAL at 06:08

## 2025-01-01 RX ADMIN — Medication 81 MILLIGRAM(S): at 11:42

## 2025-01-01 RX ADMIN — INSULIN LISPRO 8 UNIT(S): 100 INJECTION, SOLUTION INTRAVENOUS; SUBCUTANEOUS at 12:57

## 2025-01-01 RX ADMIN — RIVAROXABAN 15 MILLIGRAM(S): 10 TABLET, FILM COATED ORAL at 18:05

## 2025-01-01 RX ADMIN — METOPROLOL SUCCINATE 75 MILLIGRAM(S): 50 TABLET, EXTENDED RELEASE ORAL at 05:57

## 2025-01-01 RX ADMIN — Medication 25 MILLIGRAM(S): at 22:15

## 2025-01-01 RX ADMIN — GABAPENTIN 100 MILLIGRAM(S): 400 CAPSULE ORAL at 02:56

## 2025-01-01 RX ADMIN — RANOLAZINE 1000 MILLIGRAM(S): 1000 TABLET, FILM COATED, EXTENDED RELEASE ORAL at 18:02

## 2025-01-01 RX ADMIN — BUMETANIDE 2 MILLIGRAM(S): 1 TABLET ORAL at 14:25

## 2025-01-01 RX ADMIN — Medication 81 MILLIGRAM(S): at 13:49

## 2025-01-01 RX ADMIN — ISOSORBDIE DINITRATE 30 MILLIGRAM(S): 30 TABLET ORAL at 06:08

## 2025-01-01 RX ADMIN — WHITE PETROLATUM 1 APPLICATION(S): 1 OINTMENT TOPICAL at 17:04

## 2025-01-01 RX ADMIN — ISOSORBDIE DINITRATE 20 MILLIGRAM(S): 30 TABLET ORAL at 17:21

## 2025-01-01 RX ADMIN — WHITE PETROLATUM 1 APPLICATION(S): 1 OINTMENT TOPICAL at 17:53

## 2025-01-01 RX ADMIN — ISOSORBDIE DINITRATE 30 MILLIGRAM(S): 30 TABLET ORAL at 11:25

## 2025-01-01 RX ADMIN — ISOSORBDIE DINITRATE 30 MILLIGRAM(S): 30 TABLET ORAL at 09:07

## 2025-01-01 RX ADMIN — ISOSORBDIE DINITRATE 40 MILLIGRAM(S): 30 TABLET ORAL at 11:11

## 2025-01-01 RX ADMIN — ISOSORBDIE DINITRATE 40 MILLIGRAM(S): 30 TABLET ORAL at 05:37

## 2025-01-01 RX ADMIN — BUMETANIDE 5 MG/HR: 1 TABLET ORAL at 05:31

## 2025-01-01 RX ADMIN — Medication 100 MILLIGRAM(S): at 15:44

## 2025-01-01 RX ADMIN — WHITE PETROLATUM 1 APPLICATION(S): 1 OINTMENT TOPICAL at 17:22

## 2025-01-01 RX ADMIN — INSULIN LISPRO 6 UNIT(S): 100 INJECTION, SOLUTION INTRAVENOUS; SUBCUTANEOUS at 17:59

## 2025-01-01 RX ADMIN — ISOSORBDIE DINITRATE 30 MILLIGRAM(S): 30 TABLET ORAL at 06:03

## 2025-01-01 RX ADMIN — ISOSORBDIE DINITRATE 10 MILLIGRAM(S): 30 TABLET ORAL at 15:58

## 2025-01-01 RX ADMIN — INSULIN LISPRO 1: 100 INJECTION, SOLUTION INTRAVENOUS; SUBCUTANEOUS at 22:09

## 2025-01-01 RX ADMIN — Medication 81 MILLIGRAM(S): at 11:34

## 2025-01-01 RX ADMIN — WHITE PETROLATUM 1 APPLICATION(S): 1 OINTMENT TOPICAL at 05:33

## 2025-01-01 RX ADMIN — RANOLAZINE 500 MILLIGRAM(S): 1000 TABLET, FILM COATED, EXTENDED RELEASE ORAL at 05:59

## 2025-01-01 RX ADMIN — NITROGLYCERIN 0.4 MILLIGRAM(S): 20 OINTMENT TOPICAL at 12:30

## 2025-01-01 RX ADMIN — INSULIN LISPRO 3: 100 INJECTION, SOLUTION INTRAVENOUS; SUBCUTANEOUS at 13:15

## 2025-01-01 RX ADMIN — INSULIN LISPRO 1: 100 INJECTION, SOLUTION INTRAVENOUS; SUBCUTANEOUS at 18:06

## 2025-01-01 RX ADMIN — INSULIN LISPRO 4: 100 INJECTION, SOLUTION INTRAVENOUS; SUBCUTANEOUS at 13:06

## 2025-01-01 RX ADMIN — Medication 30 MILLILITER(S): at 16:30

## 2025-01-01 RX ADMIN — Medication 25 MILLIGRAM(S): at 06:11

## 2025-01-01 RX ADMIN — WHITE PETROLATUM 1 APPLICATION(S): 1 OINTMENT TOPICAL at 18:07

## 2025-01-01 RX ADMIN — INSULIN GLARGINE-YFGN 17 UNIT(S): 100 INJECTION, SOLUTION SUBCUTANEOUS at 22:09

## 2025-01-01 RX ADMIN — BUMETANIDE 10 MG/HR: 1 TABLET ORAL at 22:40

## 2025-01-01 RX ADMIN — Medication 25 MILLIGRAM(S): at 05:49

## 2025-01-01 RX ADMIN — RANOLAZINE 1000 MILLIGRAM(S): 1000 TABLET, FILM COATED, EXTENDED RELEASE ORAL at 17:19

## 2025-01-01 RX ADMIN — WHITE PETROLATUM 1 APPLICATION(S): 1 OINTMENT TOPICAL at 05:37

## 2025-01-01 RX ADMIN — RIVAROXABAN 15 MILLIGRAM(S): 10 TABLET, FILM COATED ORAL at 01:12

## 2025-01-01 RX ADMIN — Medication 100 MILLIGRAM(S): at 05:12

## 2025-01-01 RX ADMIN — Medication 81 MILLIGRAM(S): at 12:30

## 2025-01-01 RX ADMIN — BUMETANIDE 5 MG/HR: 1 TABLET ORAL at 08:54

## 2025-01-01 RX ADMIN — Medication 25 MILLIGRAM(S): at 18:13

## 2025-01-01 RX ADMIN — OXYCODONE HYDROCHLORIDE 2.5 MILLIGRAM(S): 30 TABLET ORAL at 16:18

## 2025-01-01 RX ADMIN — Medication 650 MILLIGRAM(S): at 05:49

## 2025-01-01 RX ADMIN — INSULIN LISPRO 1: 100 INJECTION, SOLUTION INTRAVENOUS; SUBCUTANEOUS at 09:12

## 2025-01-01 RX ADMIN — Medication 650 MILLIGRAM(S): at 19:06

## 2025-01-01 RX ADMIN — RANOLAZINE 500 MILLIGRAM(S): 1000 TABLET, FILM COATED, EXTENDED RELEASE ORAL at 17:47

## 2025-01-08 NOTE — CONSULT NOTE ADULT - PROVIDER SPECIALTY LIST ADULT
Nephrology
Surgery
Cardiology
Vascular Surgery
Endocrinology
Infectious Disease
Nephrology
Orthopedics
Rheumatology
Podiatry
Statement Selected

## 2025-01-17 ENCOUNTER — INPATIENT (INPATIENT)
Facility: HOSPITAL | Age: 70
LOS: 9 days | Discharge: HOME HEALTH | DRG: 293 | End: 2025-01-27
Attending: GENERAL ACUTE CARE HOSPITAL | Admitting: STUDENT IN AN ORGANIZED HEALTH CARE EDUCATION/TRAINING PROGRAM
Payer: MEDICARE

## 2025-01-17 VITALS
RESPIRATION RATE: 20 BRPM | HEIGHT: 66 IN | HEART RATE: 78 BPM | DIASTOLIC BLOOD PRESSURE: 82 MMHG | SYSTOLIC BLOOD PRESSURE: 136 MMHG | WEIGHT: 138.89 LBS | OXYGEN SATURATION: 100 % | TEMPERATURE: 98 F

## 2025-01-17 DIAGNOSIS — I50.9 HEART FAILURE, UNSPECIFIED: ICD-10-CM

## 2025-01-17 LAB
APTT BLD: 41 SEC — HIGH (ref 24.5–35.6)
BASOPHILS # BLD AUTO: 0.04 K/UL — SIGNIFICANT CHANGE UP (ref 0–0.2)
BASOPHILS NFR BLD AUTO: 0.4 % — SIGNIFICANT CHANGE UP (ref 0–2)
EOSINOPHIL # BLD AUTO: 0.06 K/UL — SIGNIFICANT CHANGE UP (ref 0–0.5)
EOSINOPHIL NFR BLD AUTO: 0.6 % — SIGNIFICANT CHANGE UP (ref 0–6)
FLUAV AG NPH QL: SIGNIFICANT CHANGE UP
FLUBV AG NPH QL: SIGNIFICANT CHANGE UP
GAS PNL BLDV: SIGNIFICANT CHANGE UP
HCT VFR BLD CALC: 45.8 % — SIGNIFICANT CHANGE UP (ref 39–50)
HGB BLD-MCNC: 16 G/DL — SIGNIFICANT CHANGE UP (ref 13–17)
IMM GRANULOCYTES NFR BLD AUTO: 0.5 % — SIGNIFICANT CHANGE UP (ref 0–0.9)
INR BLD: 2.09 RATIO — HIGH (ref 0.85–1.16)
LYMPHOCYTES # BLD AUTO: 1 K/UL — SIGNIFICANT CHANGE UP (ref 1–3.3)
LYMPHOCYTES # BLD AUTO: 9.7 % — LOW (ref 13–44)
MCHC RBC-ENTMCNC: 34 PG — SIGNIFICANT CHANGE UP (ref 27–34)
MCHC RBC-ENTMCNC: 34.9 G/DL — SIGNIFICANT CHANGE UP (ref 32–36)
MCV RBC AUTO: 97.4 FL — SIGNIFICANT CHANGE UP (ref 80–100)
MONOCYTES # BLD AUTO: 0.81 K/UL — SIGNIFICANT CHANGE UP (ref 0–0.9)
MONOCYTES NFR BLD AUTO: 7.9 % — SIGNIFICANT CHANGE UP (ref 2–14)
NEUTROPHILS # BLD AUTO: 8.32 K/UL — HIGH (ref 1.8–7.4)
NEUTROPHILS NFR BLD AUTO: 80.9 % — HIGH (ref 43–77)
NRBC # BLD: 0 /100 WBCS — SIGNIFICANT CHANGE UP (ref 0–0)
NRBC BLD-RTO: 0 /100 WBCS — SIGNIFICANT CHANGE UP (ref 0–0)
NT-PROBNP SERPL-SCNC: HIGH PG/ML (ref 0–300)
PLATELET # BLD AUTO: 309 K/UL — SIGNIFICANT CHANGE UP (ref 150–400)
PROTHROM AB SERPL-ACNC: 23.9 SEC — HIGH (ref 9.9–13.4)
RBC # BLD: 4.7 M/UL — SIGNIFICANT CHANGE UP (ref 4.2–5.8)
RBC # FLD: 13.8 % — SIGNIFICANT CHANGE UP (ref 10.3–14.5)
RSV RNA NPH QL NAA+NON-PROBE: SIGNIFICANT CHANGE UP
SARS-COV-2 RNA SPEC QL NAA+PROBE: SIGNIFICANT CHANGE UP
TROPONIN T, HIGH SENSITIVITY RESULT: 310 NG/L — HIGH (ref 0–51)
TROPONIN T, HIGH SENSITIVITY RESULT: 376 NG/L — HIGH (ref 0–51)
WBC # BLD: 10.28 K/UL — SIGNIFICANT CHANGE UP (ref 3.8–10.5)
WBC # FLD AUTO: 10.28 K/UL — SIGNIFICANT CHANGE UP (ref 3.8–10.5)

## 2025-01-17 PROCEDURE — 73610 X-RAY EXAM OF ANKLE: CPT | Mod: 26,LT

## 2025-01-17 PROCEDURE — 73630 X-RAY EXAM OF FOOT: CPT | Mod: 26,LT

## 2025-01-17 PROCEDURE — 99285 EMERGENCY DEPT VISIT HI MDM: CPT | Mod: GC

## 2025-01-17 PROCEDURE — 71045 X-RAY EXAM CHEST 1 VIEW: CPT | Mod: 26

## 2025-01-17 RX ORDER — VANCOMYCIN HYDROCHLORIDE 50 MG/ML
1000 KIT ORAL ONCE
Refills: 0 | Status: COMPLETED | OUTPATIENT
Start: 2025-01-17 | End: 2025-01-17

## 2025-01-17 RX ORDER — BACTERIOSTATIC SODIUM CHLORIDE 0.9 %
1000 VIAL (ML) INJECTION ONCE
Refills: 0 | Status: COMPLETED | OUTPATIENT
Start: 2025-01-17 | End: 2025-01-17

## 2025-01-17 RX ORDER — PIPERACILLIN SODIUM AND TAZOBACTAM SODIUM 2; 250 G/50ML; MG/50ML
3.38 INJECTION, POWDER, FOR SOLUTION INTRAVENOUS ONCE
Refills: 0 | Status: COMPLETED | OUTPATIENT
Start: 2025-01-17 | End: 2025-01-17

## 2025-01-17 RX ADMIN — PIPERACILLIN SODIUM AND TAZOBACTAM SODIUM 200 GRAM(S): 2; 250 INJECTION, POWDER, FOR SOLUTION INTRAVENOUS at 18:48

## 2025-01-17 RX ADMIN — Medication 80 MILLIGRAM(S): at 21:38

## 2025-01-17 RX ADMIN — Medication 1000 MILLILITER(S): at 18:48

## 2025-01-17 RX ADMIN — VANCOMYCIN HYDROCHLORIDE 250 MILLIGRAM(S): KIT at 21:37

## 2025-01-17 NOTE — ED ADULT NURSE NOTE - OBJECTIVE STATEMENT
69 year old male, A&Ox4, PMH of CHF, HTN, HLD, type 2 DM, CAD s/p stents x10, presenting to ED c/o shortness of breath and left foot wound. Patient states he has been experiencing increased SOB on exertion. Also endorsing pain and swelling in left foot, recommended to come into ED by PCP for concern for infection.

## 2025-01-17 NOTE — ED PROVIDER NOTE - ATTENDING CONTRIBUTION TO CARE
69 M w/ L foot wound reports saw PCP and told to come to the hospital 69 M w/ ·PMH of CHF, HTN, HLD, type 2 DM, CAD s/p stents x10, presenting to ED c/o shortness of breath and left foot wound pt w/ wound on the L foot, pt states no fevers, no chills, increasing redness of the leg, pt w/ increasing sob w/ exertion. pt sent by pcp for possible infection pt not on antibiotics.   I have reviewed the triage vital signs. Const: no acute distress, Well-developed, Eyes: no conjunctival injection and no scleral icterus ENMT: Moist mucus membranes, CVS: +S1/S2, radial/DP pulse 2+ bilaterally RESP: Unlabored respiratory effort, Clear to auscultation bilaterally GI: Nontender/Nondistended soft abdomen, no CVA tenderness MSK: redness on the top of the L foot , w a small open wound, pt w/ intact sensation in the b/l lower legs, no calf tenderness  Psych: Awake, Alert, & Orientedx3;  Appropriate mood and affect, cooperative  Plan for labs imaging and reassessment findings c/f posisble osteomylitis vs cellulitis, likely TBA for further management will discuss w/ podiatry 69 M w/ ·PMH of CHF, HTN, HLD, type 2 DM, CAD s/p stents x10, presenting to ED c/o shortness of breath for a few weeks,  and left foot wound pt w/ wound on the L foot, pt states no fevers, no chills, increasing redness of the leg, pt w/ increasing sob w/ exertion. pt sent by pcp for possible infection pt not on antibiotics.   I have reviewed the triage vital signs. Const: no acute distress, Well-developed, Eyes: no conjunctival injection and no scleral icterus ENMT: Moist mucus membranes, CVS: +S1/S2, radial/DP pulse 2+ bilaterally RESP: Unlabored respiratory effort, Clear to auscultation bilaterally GI: Nontender/Nondistended soft abdomen, no CVA tenderness MSK: redness on the top of the L foot , w a small open wound, pt w/ intact sensation in the b/l lower legs, no calf tenderness  Psych: Awake, Alert, & Orientedx3;  Appropriate mood and affect, cooperative  Plan for labs imaging and reassessment findings c/f posisble osteomylitis vs cellulitis, likely TBA for further management will discuss w/ podiatry additionally w/ CLEARY pt will have ekg/probnp and trop ?worsening HF

## 2025-01-17 NOTE — ED ADULT NURSE NOTE - NS TRANSFER PATIENT BELONGINGS
Nephrology Progress Note  11/12/2022 12:47 PM        Subjective:   Admit Date: 11/11/2022  PCP: Earl Salmon MD    Interval History: Underwent right below-knee amputation uneventfully  Doing well, alert awake and oriented and sitting in the chair and eating breakfast    Diet: Good oral intake    ROS: No shortness of breath or confusion has prosthetics on  No fever and acceptable blood pressure    Data:     Current meds:    sodium chloride flush  5-40 mL IntraVENous 2 times per day    heparin (porcine)  5,000 Units SubCUTAneous 3 times per day    carvedilol  6.25 mg Oral BID    furosemide  40 mg Oral BID    gabapentin  800 mg Oral BID    lisinopril  20 mg Oral Daily    LORazepam  0.5 mg Oral Q12H    pantoprazole  40 mg Oral QAM AC    insulin lispro  15 Units SubCUTAneous TID AC      sodium chloride      dextrose           I/O last 3 completed shifts:   In: 1060 [P.O.:10; I.V.:1000; IV Piggyback:50]  Out: 20 [Blood:20]    CBC:   Recent Labs     11/11/22  1229 11/12/22  0359   WBC 13.7* 15.6*   HGB 10.1* 7.5*    281          Recent Labs     11/11/22  1229 11/12/22  0359   * 138   K 4.3 4.3   CL 93* 99   CO2 24 24   BUN 26* 32*   CREATININE 6.8* 7.6*   GLUCOSE 126* 175*       Lab Results   Component Value Date    CALCIUM 7.5 (L) 11/12/2022    PHOS 7.6 (H) 11/12/2022       Objective:     Vitals: BP (!) 119/52   Pulse 75   Temp 97.4 °F (36.3 °C) (Oral)   Resp 18   Ht 5' 3\" (1.6 m)   Wt 144 lb 6.4 oz (65.5 kg)   SpO2 97%   BMI 25.58 kg/m² ,    General appearance: Alert, awake and oriented  HEENT: At least 2+ conjunctival pallor  Neck: Seems supple  Lungs: No crackles  Heart: Regular rate and rhythm  Abdomen: Soft, nontender  Extremities: Right below-knee amputation with prosthetics  Left upper extremity brachiocephalic fistula with good thrill on palpation good bruit auscultation      Problem List :         Impression :     End-stage kidney disease on maintenance dialysis-she is due for Monday  Gangrene involving right lower leg status post below-knee amputation  Underlying diabetes mellitus and atherosclerotic cardiovascular disease as well as anemia, secondary hyperparathyroidism and hyperphosphatemia    Recommendation/Plan  :     She is on fairly high-dose of gabapentin-tolerating well so far but watch for toxicity especially with end-stage kidney disease  Keep normoglycemia  We will treat her with IV iron and erythropoiesis stimulating agent during dialysis  Watch for iatrogenic nosocomial complication, follow clinically and biochemically      Mari Liu MD MD Clothing

## 2025-01-17 NOTE — ED CLERICAL - NS ED CLERK NOTE PRE-ARRIVAL INFORMATION; ADDITIONAL PRE-ARRIVAL INFORMATION
CC/Reason For referral: llq abdominal pain.  possible hernia r/o incarcerated hernia  Preferred Consultant(if applicable):  Who admits for you (if needed):  Do you have documents you would like to fax over?  Would you still like to speak to an ED attending?  please call after patient is seen

## 2025-01-17 NOTE — ED ADULT NURSE NOTE - NS ED NURSE LEVEL OF CONSCIOUSNESS ORIENTATION
Oriented - self; Oriented - place; Oriented - time

## 2025-01-17 NOTE — ED ADULT TRIAGE NOTE - CHIEF COMPLAINT QUOTE
Left foot redness sent to rule out cellulitis. Also reporting intermittent dyspnea upon exertion and shortness of breath x 2 weeks. PMH of CAD with multiple stents.

## 2025-01-17 NOTE — ED ADULT NURSE NOTE - NS ED NOTE ABUSE SUSPICION NEGLECT YN
CHIEF COMPLAINT  Chief Complaint   Patient presents with    Back Pain     4 month f/u from Lumbar epidural on 12/13/23  CC  Lumbar radiculopathy  No Opoid Use        Primary Care  Mariaelena Granger APRN Subjective   Amy Pyle is a 62 y.o. female  who presents for chronic radicular low back pain.  She reports that several months ago, she was injured while she was working.  She reports lifting a heavy pallet of supplies and felt a popping sensation.  Since that time, she has had significant radicular back pain especially into the right leg including the anterior thigh and lateral calf.  She reports that while some of her pain has improved over time, she is left with fairly significant back pain which makes it difficult to walk and be physically active.  She denies any profound numbness or weakness.  She did undergo an MRI which shows a very significant L3-4 disc herniation which likely explains the majority of her pain.  She has tried conservative treatment including meloxicam and cyclobenzaprine with only limited benefit.  She has tried home exercise and physical therapy with limited benefit.    History of Present Illness     Location: Low back with radiation to the right lower extremity  Onset: Several months ago  Duration: Slightly improved  Timing: Constant throughout the day  Quality: Sharp, aching, stabbing  Severity: Today: 4       Last Week: 4       Worst: 8  Modifying Factors: The pain is worse with physical activity and movement and walking and slightly improved with sitting and resting  Functional Deficit: The pain makes it difficult for her to work her fairly active job    Physical Therapy: yes    Interval Update 04/15/2024: She is approximately 4 months out from lumbar epidural steroid injection.  Overall, it appears that she is significant improved.  She really does not have any significant numbness, tingling, or radiculopathy.  Unfortunately, she also had a partial nephrectomy during this  time as well which somewhat complicates her clinical presentation.  She primarily reports a burning, tight sensation in the right low back and just above the right buttock more suggestive of myofascial pain and may be component of spondylosis as opposed to the previous lumbar radiculopathy that she was having.  She reports the pain is worse in the mornings and tends to improve throughout the day.  She is currently working light duty but wishes to return to full duty.    The following portions of the patient's history were reviewed and updated as appropriate: allergies, current medications, past family history, past medical history, past social history, past surgical history, and problem list.    Procedures:        Current Outpatient Medications:     albuterol sulfate  (90 Base) MCG/ACT inhaler, Inhale 2 puffs Every 4 (Four) Hours As Needed for Wheezing., Disp: 1 g, Rfl: 2    Budeson-Glycopyrrol-Formoterol (Breztri Aerosphere) 160-9-4.8 MCG/ACT aerosol inhaler, Inhale 2 puffs 2 (Two) Times a Day., Disp: 8 g, Rfl: 2    ipratropium-albuterol (DUO-NEB) 0.5-2.5 mg/3 ml nebulizer, USE 3 ML VIA NEBULIZER EVERY 4 HOURS AS NEEDED FOR WHEEZING OR SHORTNESS OF BREATH, Disp: 1620 mL, Rfl: 1    sertraline (Zoloft) 50 MG tablet, Take 1 tablet by mouth Daily., Disp: 30 tablet, Rfl: 2    tiZANidine (ZANAFLEX) 2 MG tablet, Take 1 tablet by mouth Every 8 (Eight) Hours As Needed for Muscle Spasms., Disp: 90 tablet, Rfl: 0    Review of Systems   Musculoskeletal:  Positive for arthralgias, back pain, gait problem and myalgias. Negative for joint swelling, neck pain and neck stiffness.   Neurological:  Positive for numbness. Negative for weakness.   Psychiatric/Behavioral:  The patient is nervous/anxious.        Vitals:    04/15/24 0804   BP: 138/77   BP Location: Left arm   Patient Position: Sitting   Cuff Size: Adult   Pulse: 77   Resp: 16   SpO2: 97%   Weight: 90.9 kg (200 lb 6.4 oz)   PainSc:   3   PainLoc: Back       Urine  Drug Screen: Pending  Appropriate: Pending    Objective   Physical Exam  Vitals and nursing note reviewed.   Constitutional:       General: She is not in acute distress.     Appearance: Normal appearance. She is well-developed and normal weight.   Neck:      Trachea: No tracheal deviation.   Musculoskeletal:      Cervical back: Normal range of motion and neck supple. No tenderness.      Comments: Lumbar Spine Exam:  Tender to palpation over the lumbar paraspinal musculature Yes  Limited range of motion secondary to pain Yes  Facet loading positive: Equivocal  Facets tender to palpation: Equivocal  Straight leg raise test positive: right     Neurological:      General: No focal deficit present.      Mental Status: She is alert.      Sensory: No sensory deficit.      Motor: No weakness.   Psychiatric:      Comments: Profound anxiety, very tearful           Assessment & Plan   Problems Addressed this Visit       Muscle spasm - Primary    Relevant Orders    Ambulatory Referral to Physical Therapy Evaluate and treat     Other Visit Diagnoses       Myofascial pain        Relevant Orders    Ambulatory Referral to Physical Therapy Evaluate and treat          Diagnoses         Codes Comments    Muscle spasm    -  Primary ICD-10-CM: M62.838  ICD-9-CM: 728.85     Myofascial pain     ICD-10-CM: M79.18  ICD-9-CM: 729.1             Plan:  From a lumbar radiculopathy standpoint, I feel that she has gotten fairly significant benefit from her lumbar epidural steroid injection  I will refer her back to physical therapy to work on some stretching exercises and improving her range of motion.  Unfortunately, after her lumbar epidural steroid injection she was not able to return to regular duty as she had to undergo a partial nephrectomy and had a significant recovery from this  We can do some tizanidine up to 3 times daily to help with myofascial pain  As she is no longer describing the numbness in the leg, I feel that the majority the  pain is residual myofascial pain  She is asking to return to full duty.  I feel that this is appropriate if she is able to tolerate  --- Follow-up 2 months           INSPECT REPORT    As part of the patient's treatment plan, I may be prescribing controlled substances. The patient has been made aware of appropriate use of such medications, including potential risk of somnolence, limited ability to drive and/or work safely, and the potential for dependence or overdose. It has also bee made clear that these medications are for use by this patient only, without concomitant use of alcohol or other substances unless prescribed.     Patient has completed prescribing agreement detailing terms of continued prescribing of controlled substances, including monitoring RONNIE reports, urine drug screening, and pill counts if necessary. The patient is aware that inappropriate use will results in cessation of prescribing such medications.    INSPECT report has been reviewed and scanned into the patient's chart.    As the clinician, I personally reviewed the INSPECT from 4/12/2024.    History and physical exam exhibit continued safe and appropriate use of controlled substances.      EMR Dragon/Transcription disclaimer:   Much of this encounter note is an electronic transcription/translation of spoken language to printed text. The electronic translation of spoken language may permit erroneous, or at times, nonsensical words or phrases to be inadvertently transcribed; Although I have reviewed the note for such errors, some may still exist.          No

## 2025-01-17 NOTE — ED ADULT NURSE NOTE - NSFALLUNIVINTERV_ED_ALL_ED
Bed/Stretcher in lowest position, wheels locked, appropriate side rails in place/Call bell, personal items and telephone in reach/Instruct patient to call for assistance before getting out of bed/chair/stretcher/Non-slip footwear applied when patient is off stretcher/[place] to call system/Physically safe environment - no spills, clutter or unnecessary equipment/Purposeful proactive rounding/Room/bathroom lighting operational, light cord in reach

## 2025-01-18 DIAGNOSIS — L03.116 CELLULITIS OF LEFT LOWER LIMB: ICD-10-CM

## 2025-01-18 DIAGNOSIS — I25.10 ATHEROSCLEROTIC HEART DISEASE OF NATIVE CORONARY ARTERY WITHOUT ANGINA PECTORIS: ICD-10-CM

## 2025-01-18 DIAGNOSIS — N18.9 CHRONIC KIDNEY DISEASE, UNSPECIFIED: ICD-10-CM

## 2025-01-18 DIAGNOSIS — S91.309A UNSPECIFIED OPEN WOUND, UNSPECIFIED FOOT, INITIAL ENCOUNTER: ICD-10-CM

## 2025-01-18 DIAGNOSIS — I50.9 HEART FAILURE, UNSPECIFIED: ICD-10-CM

## 2025-01-18 DIAGNOSIS — E11.9 TYPE 2 DIABETES MELLITUS WITHOUT COMPLICATIONS: ICD-10-CM

## 2025-01-18 DIAGNOSIS — I10 ESSENTIAL (PRIMARY) HYPERTENSION: ICD-10-CM

## 2025-01-18 DIAGNOSIS — Z29.9 ENCOUNTER FOR PROPHYLACTIC MEASURES, UNSPECIFIED: ICD-10-CM

## 2025-01-18 DIAGNOSIS — I48.20 CHRONIC ATRIAL FIBRILLATION, UNSPECIFIED: ICD-10-CM

## 2025-01-18 DIAGNOSIS — R06.09 OTHER FORMS OF DYSPNEA: ICD-10-CM

## 2025-01-18 LAB
ALBUMIN SERPL ELPH-MCNC: 3.9 G/DL — SIGNIFICANT CHANGE UP (ref 3.3–5)
ALP SERPL-CCNC: 185 U/L — HIGH (ref 40–120)
ALT FLD-CCNC: 47 U/L — HIGH (ref 10–45)
ANION GAP SERPL CALC-SCNC: 14 MMOL/L — SIGNIFICANT CHANGE UP (ref 5–17)
APTT BLD: 39.1 SEC — HIGH (ref 24.5–35.6)
AST SERPL-CCNC: 53 U/L — HIGH (ref 10–40)
BASOPHILS # BLD AUTO: 0.06 K/UL — SIGNIFICANT CHANGE UP (ref 0–0.2)
BASOPHILS NFR BLD AUTO: 0.5 % — SIGNIFICANT CHANGE UP (ref 0–2)
BILIRUB SERPL-MCNC: 2.3 MG/DL — HIGH (ref 0.2–1.2)
BUN SERPL-MCNC: 40 MG/DL — HIGH (ref 7–23)
CALCIUM SERPL-MCNC: 9.9 MG/DL — SIGNIFICANT CHANGE UP (ref 8.4–10.5)
CHLORIDE SERPL-SCNC: 104 MMOL/L — SIGNIFICANT CHANGE UP (ref 96–108)
CO2 SERPL-SCNC: 24 MMOL/L — SIGNIFICANT CHANGE UP (ref 22–31)
CREAT SERPL-MCNC: 1.98 MG/DL — HIGH (ref 0.5–1.3)
EGFR: 36 ML/MIN/1.73M2 — LOW
EOSINOPHIL # BLD AUTO: 0.13 K/UL — SIGNIFICANT CHANGE UP (ref 0–0.5)
EOSINOPHIL NFR BLD AUTO: 1.1 % — SIGNIFICANT CHANGE UP (ref 0–6)
ERYTHROCYTE [SEDIMENTATION RATE] IN BLOOD: 11 MM/HR — SIGNIFICANT CHANGE UP (ref 0–20)
GLUCOSE BLDC GLUCOMTR-MCNC: 337 MG/DL — HIGH (ref 70–99)
GLUCOSE BLDC GLUCOMTR-MCNC: 64 MG/DL — LOW (ref 70–99)
GLUCOSE BLDC GLUCOMTR-MCNC: 76 MG/DL — SIGNIFICANT CHANGE UP (ref 70–99)
GLUCOSE BLDC GLUCOMTR-MCNC: 82 MG/DL — SIGNIFICANT CHANGE UP (ref 70–99)
GLUCOSE BLDC GLUCOMTR-MCNC: 84 MG/DL — SIGNIFICANT CHANGE UP (ref 70–99)
GLUCOSE SERPL-MCNC: 79 MG/DL — SIGNIFICANT CHANGE UP (ref 70–99)
HCT VFR BLD CALC: 46.7 % — SIGNIFICANT CHANGE UP (ref 39–50)
HGB BLD-MCNC: 15.2 G/DL — SIGNIFICANT CHANGE UP (ref 13–17)
IMM GRANULOCYTES NFR BLD AUTO: 0.5 % — SIGNIFICANT CHANGE UP (ref 0–0.9)
INR BLD: 1.68 RATIO — HIGH (ref 0.85–1.16)
LYMPHOCYTES # BLD AUTO: 1.09 K/UL — SIGNIFICANT CHANGE UP (ref 1–3.3)
LYMPHOCYTES # BLD AUTO: 9.6 % — LOW (ref 13–44)
MAGNESIUM SERPL-MCNC: 2.3 MG/DL — SIGNIFICANT CHANGE UP (ref 1.6–2.6)
MCHC RBC-ENTMCNC: 30.4 PG — SIGNIFICANT CHANGE UP (ref 27–34)
MCHC RBC-ENTMCNC: 32.5 G/DL — SIGNIFICANT CHANGE UP (ref 32–36)
MCV RBC AUTO: 93.4 FL — SIGNIFICANT CHANGE UP (ref 80–100)
MONOCYTES # BLD AUTO: 1.09 K/UL — HIGH (ref 0–0.9)
MONOCYTES NFR BLD AUTO: 9.6 % — SIGNIFICANT CHANGE UP (ref 2–14)
MRSA PCR RESULT.: DETECTED
NEUTROPHILS # BLD AUTO: 8.97 K/UL — HIGH (ref 1.8–7.4)
NEUTROPHILS NFR BLD AUTO: 78.7 % — HIGH (ref 43–77)
NRBC # BLD: 0 /100 WBCS — SIGNIFICANT CHANGE UP (ref 0–0)
NRBC BLD-RTO: 0 /100 WBCS — SIGNIFICANT CHANGE UP (ref 0–0)
PHOSPHATE SERPL-MCNC: 3.9 MG/DL — SIGNIFICANT CHANGE UP (ref 2.5–4.5)
PLATELET # BLD AUTO: 271 K/UL — SIGNIFICANT CHANGE UP (ref 150–400)
POTASSIUM SERPL-MCNC: 4.9 MMOL/L — SIGNIFICANT CHANGE UP (ref 3.5–5.3)
POTASSIUM SERPL-SCNC: 4.9 MMOL/L — SIGNIFICANT CHANGE UP (ref 3.5–5.3)
PROT SERPL-MCNC: 6.4 G/DL — SIGNIFICANT CHANGE UP (ref 6–8.3)
PROTHROM AB SERPL-ACNC: 19 SEC — HIGH (ref 9.9–13.4)
RAPID RVP RESULT: SIGNIFICANT CHANGE UP
RBC # BLD: 5 M/UL — SIGNIFICANT CHANGE UP (ref 4.2–5.8)
RBC # FLD: 14 % — SIGNIFICANT CHANGE UP (ref 10.3–14.5)
S AUREUS DNA NOSE QL NAA+PROBE: DETECTED
SARS-COV-2 RNA SPEC QL NAA+PROBE: SIGNIFICANT CHANGE UP
SODIUM SERPL-SCNC: 142 MMOL/L — SIGNIFICANT CHANGE UP (ref 135–145)
WBC # BLD: 11.4 K/UL — HIGH (ref 3.8–10.5)
WBC # FLD AUTO: 11.4 K/UL — HIGH (ref 3.8–10.5)

## 2025-01-18 PROCEDURE — 99223 1ST HOSP IP/OBS HIGH 75: CPT | Mod: GC

## 2025-01-18 PROCEDURE — 12345: CPT | Mod: NC,GC

## 2025-01-18 RX ORDER — INSULIN LISPRO 100/ML
11 VIAL (ML) SUBCUTANEOUS
Refills: 0 | Status: DISCONTINUED | OUTPATIENT
Start: 2025-01-18 | End: 2025-01-23

## 2025-01-18 RX ORDER — METOPROLOL SUCCINATE 25 MG
50 TABLET, EXTENDED RELEASE 24 HR ORAL DAILY
Refills: 0 | Status: DISCONTINUED | OUTPATIENT
Start: 2025-01-18 | End: 2025-01-23

## 2025-01-18 RX ORDER — RIVAROXABAN 20 MG/1
1 TABLET, FILM COATED ORAL
Qty: 0 | Refills: 0 | DISCHARGE
Start: 2025-01-18

## 2025-01-18 RX ORDER — ISOSORBIDE DINITRATE 40 MG/1
1 TABLET ORAL
Qty: 0 | Refills: 0 | DISCHARGE
Start: 2025-01-18

## 2025-01-18 RX ORDER — SODIUM BICARBONATE 42 MG/ML
650 INJECTION, SOLUTION INTRAVENOUS THREE TIMES A DAY
Refills: 0 | Status: DISCONTINUED | OUTPATIENT
Start: 2025-01-18 | End: 2025-01-23

## 2025-01-18 RX ORDER — SODIUM CHLORIDE 9 G/ML
1000 INJECTION, SOLUTION INTRAVENOUS
Refills: 0 | Status: DISCONTINUED | OUTPATIENT
Start: 2025-01-18 | End: 2025-01-23

## 2025-01-18 RX ORDER — INSULIN GLARGINE-YFGN 100 [IU]/ML
18 INJECTION, SOLUTION SUBCUTANEOUS AT BEDTIME
Refills: 0 | Status: DISCONTINUED | OUTPATIENT
Start: 2025-01-18 | End: 2025-01-18

## 2025-01-18 RX ORDER — PIPERACILLIN SODIUM AND TAZOBACTAM SODIUM 2; 250 G/50ML; MG/50ML
3.38 INJECTION, POWDER, FOR SOLUTION INTRAVENOUS EVERY 8 HOURS
Refills: 0 | Status: DISCONTINUED | OUTPATIENT
Start: 2025-01-18 | End: 2025-01-19

## 2025-01-18 RX ORDER — INSULIN LISPRO 100/ML
VIAL (ML) SUBCUTANEOUS AT BEDTIME
Refills: 0 | Status: DISCONTINUED | OUTPATIENT
Start: 2025-01-18 | End: 2025-01-23

## 2025-01-18 RX ORDER — ACETAMINOPHEN 160 MG/5ML
650 SUSPENSION ORAL EVERY 6 HOURS
Refills: 0 | Status: DISCONTINUED | OUTPATIENT
Start: 2025-01-18 | End: 2025-01-23

## 2025-01-18 RX ORDER — GLUCAGON 3 MG/1
1 POWDER NASAL ONCE
Refills: 0 | Status: DISCONTINUED | OUTPATIENT
Start: 2025-01-18 | End: 2025-01-23

## 2025-01-18 RX ORDER — DM/PSEUDOEPHED/ACETAMINOPHEN 10-30-250
25 CAPSULE ORAL ONCE
Refills: 0 | Status: DISCONTINUED | OUTPATIENT
Start: 2025-01-18 | End: 2025-01-23

## 2025-01-18 RX ORDER — PIPERACILLIN SODIUM AND TAZOBACTAM SODIUM 2; 250 G/50ML; MG/50ML
3.38 INJECTION, POWDER, FOR SOLUTION INTRAVENOUS EVERY 8 HOURS
Refills: 0 | Status: DISCONTINUED | OUTPATIENT
Start: 2025-01-18 | End: 2025-01-18

## 2025-01-18 RX ORDER — MUPIROCIN 2 G/100G
1 CREAM TOPICAL
Refills: 0 | Status: DISCONTINUED | OUTPATIENT
Start: 2025-01-18 | End: 2025-01-23

## 2025-01-18 RX ORDER — PIPERACILLIN SODIUM AND TAZOBACTAM SODIUM 2; 250 G/50ML; MG/50ML
3.38 INJECTION, POWDER, FOR SOLUTION INTRAVENOUS ONCE
Refills: 0 | Status: COMPLETED | OUTPATIENT
Start: 2025-01-18 | End: 2025-01-18

## 2025-01-18 RX ORDER — DM/PSEUDOEPHED/ACETAMINOPHEN 10-30-250
12.5 CAPSULE ORAL ONCE
Refills: 0 | Status: DISCONTINUED | OUTPATIENT
Start: 2025-01-18 | End: 2025-01-23

## 2025-01-18 RX ORDER — DM/PSEUDOEPHED/ACETAMINOPHEN 10-30-250
15 CAPSULE ORAL ONCE
Refills: 0 | Status: DISCONTINUED | OUTPATIENT
Start: 2025-01-18 | End: 2025-01-23

## 2025-01-18 RX ORDER — ACETAMINOPHEN 160 MG/5ML
2 SUSPENSION ORAL
Qty: 0 | Refills: 0 | DISCHARGE
Start: 2025-01-18

## 2025-01-18 RX ORDER — RIVAROXABAN 20 MG/1
15 TABLET, FILM COATED ORAL
Refills: 0 | Status: DISCONTINUED | OUTPATIENT
Start: 2025-01-18 | End: 2025-01-22

## 2025-01-18 RX ORDER — INSULIN GLARGINE-YFGN 100 [IU]/ML
10 INJECTION, SOLUTION SUBCUTANEOUS AT BEDTIME
Refills: 0 | Status: DISCONTINUED | OUTPATIENT
Start: 2025-01-18 | End: 2025-01-23

## 2025-01-18 RX ORDER — DAPAGLIFLOZIN 5 MG/1
10 TABLET, FILM COATED ORAL DAILY
Refills: 0 | Status: DISCONTINUED | OUTPATIENT
Start: 2025-01-18 | End: 2025-01-18

## 2025-01-18 RX ORDER — INSULIN LISPRO 100/ML
VIAL (ML) SUBCUTANEOUS
Refills: 0 | Status: DISCONTINUED | OUTPATIENT
Start: 2025-01-18 | End: 2025-01-23

## 2025-01-18 RX ORDER — ISOSORBIDE DINITRATE 40 MG/1
10 TABLET ORAL THREE TIMES A DAY
Refills: 0 | Status: DISCONTINUED | OUTPATIENT
Start: 2025-01-18 | End: 2025-01-23

## 2025-01-18 RX ADMIN — Medication 40 MILLIGRAM(S): at 17:50

## 2025-01-18 RX ADMIN — Medication 11 UNIT(S): at 11:57

## 2025-01-18 RX ADMIN — ISOSORBIDE DINITRATE 10 MILLIGRAM(S): 40 TABLET ORAL at 17:53

## 2025-01-18 RX ADMIN — Medication 11 UNIT(S): at 08:18

## 2025-01-18 RX ADMIN — PIPERACILLIN SODIUM AND TAZOBACTAM SODIUM 3.38 GRAM(S): 2; 250 INJECTION, POWDER, FOR SOLUTION INTRAVENOUS at 22:41

## 2025-01-18 RX ADMIN — PIPERACILLIN SODIUM AND TAZOBACTAM SODIUM 25 GRAM(S): 2; 250 INJECTION, POWDER, FOR SOLUTION INTRAVENOUS at 18:41

## 2025-01-18 RX ADMIN — MUPIROCIN 1 APPLICATION(S): 2 CREAM TOPICAL at 07:08

## 2025-01-18 RX ADMIN — PIPERACILLIN SODIUM AND TAZOBACTAM SODIUM 200 GRAM(S): 2; 250 INJECTION, POWDER, FOR SOLUTION INTRAVENOUS at 07:07

## 2025-01-18 RX ADMIN — ISOSORBIDE DINITRATE 10 MILLIGRAM(S): 40 TABLET ORAL at 07:07

## 2025-01-18 RX ADMIN — Medication 50 MILLIGRAM(S): at 07:07

## 2025-01-18 RX ADMIN — SODIUM BICARBONATE 650 MILLIGRAM(S): 42 INJECTION, SOLUTION INTRAVENOUS at 21:47

## 2025-01-18 RX ADMIN — RIVAROXABAN 15 MILLIGRAM(S): 20 TABLET, FILM COATED ORAL at 17:50

## 2025-01-18 RX ADMIN — Medication 75 MILLIGRAM(S): at 11:56

## 2025-01-18 RX ADMIN — ISOSORBIDE DINITRATE 10 MILLIGRAM(S): 40 TABLET ORAL at 11:56

## 2025-01-18 RX ADMIN — Medication 2: at 21:46

## 2025-01-18 RX ADMIN — SODIUM BICARBONATE 650 MILLIGRAM(S): 42 INJECTION, SOLUTION INTRAVENOUS at 07:07

## 2025-01-18 RX ADMIN — INSULIN GLARGINE-YFGN 10 UNIT(S): 100 INJECTION, SOLUTION SUBCUTANEOUS at 21:46

## 2025-01-18 RX ADMIN — PIPERACILLIN SODIUM AND TAZOBACTAM SODIUM 25 GRAM(S): 2; 250 INJECTION, POWDER, FOR SOLUTION INTRAVENOUS at 11:56

## 2025-01-18 RX ADMIN — SODIUM BICARBONATE 650 MILLIGRAM(S): 42 INJECTION, SOLUTION INTRAVENOUS at 15:41

## 2025-01-18 NOTE — CONSULT NOTE ADULT - ASSESSMENT
70 y/o male with hx of CAD s/p multiple  stents, HFrEF (EF~50% as of 11/26/23), A-fib, HTN, HLD, T2DM, CKD3, former smoke a/w BLe pain found to have hypokalemia and US : There is bilateral lower extremity arterial vascular disease, more severe on the right.  - SOB improved with diuretic  - abx for foot infection  - seen by Podiatry  - agree with close observation and inpt ABX  - Consider Vascular eval - known to Dr Calle   - d/w pt in detail

## 2025-01-18 NOTE — H&P ADULT - PROBLEM SELECTOR PLAN 4
CAD s/p 10 stents on Plavix   No Chest pain at this time and EKG w/o evidence of ischemia   Troponin elevated to 376, lateralized. Likely elevated iso demand and reduced renal clearance     Plan:   c/w home Plavix  Not on Statin due to severe myalgias

## 2025-01-18 NOTE — H&P ADULT - PROBLEM SELECTOR PLAN 6
Cr. 1.75, appears to be at baseline compared to previous admission Cr of 2.1   Metabolic Acidosis w/ Bicarb 16     Plan:   Start Sodium Bicarbonate 650 TID for acidosis

## 2025-01-18 NOTE — PROGRESS NOTE ADULT - PROBLEM SELECTOR PLAN 1
~1mo hx of dyspnea on exertion limiting daily activities   Extensive cardiac hx including cad s/p 10 stents, HFrEF w/ LVEF 50% in 11/2023   ProBNP elevated to 620[address], appears volume overloaded on exam  Hx consistent w/ worsening CHF vs. cardiac ischemia rather than infectious etiology   Clear lungs on CXR   Negative Flu/Cov/RSV  s/p 80mg IVP in ED     Plan:   -f/u TTE   -Supplemental O2 as needed   -f/u full RVP BCx x2  -Lasix 40mg IV BID for diuresis   -Strict I&O w/ goal net negative 1-2L   -Daily standing weights  -Cardiology consult in AM given extensive cardiac hx and worsening HF ~2weeks hx of dyspnea on exertion limiting daily activities   Extensive cardiac hx including cad s/p 10 stents, HFrEF w/ LVEF 50% in 11/2023   ProBNP elevated to 99958, appears volume overloaded on exam  Hx consistent w/ worsening CHF vs. cardiac ischemia rather than infectious etiology   Clear lungs on CXR   Negative Flu/Cov/RSV  s/p 80mg IVP in ED     Plan:   -f/u TTE   -Supplemental O2 as needed   -f/u full RVP BCx x2  -Lasix 40mg IV BID for diuresis   -Strict I&O w/ goal net negative 1-2L   -Daily standing weights  -Cardiology consult in AM given extensive cardiac hx and worsening HF

## 2025-01-18 NOTE — DISCHARGE NOTE PROVIDER - CARE PROVIDERS DIRECT ADDRESSES
,[email or web address] ,[email or web address]

## 2025-01-18 NOTE — PROGRESS NOTE ADULT - PROBLEM SELECTOR PLAN 1
~1mo hx of dyspnea on exertion limiting daily activities   Extensive cardiac hx including cad s/p 10 stents, HFrEF w/ LVEF 50% in 11/2023   ProBNP elevated to 78370, appears volume overloaded on exam  Hx consistent w/ worsening CHF vs. cardiac ischemia rather than infectious etiology   Clear lungs on CXR   Negative Flu/Cov/RSV  s/p 80mg IVP in ED     Plan:   -f/u TTE   -Supplemental O2 as needed   -f/u full RVP BCx x2  -Lasix 40mg IV BID for diuresis   -Strict I&O w/ goal net negative 1-2L   -Daily standing weights  -Cardiology consult in AM given extensive cardiac hx and worsening HF

## 2025-01-18 NOTE — PROGRESS NOTE ADULT - SUBJECTIVE AND OBJECTIVE BOX
Patient is a 69y old  Male who presents with a chief complaint of Foot infection (18 Jan 2025 11:55)       INTERVAL HPI/OVERNIGHT EVENTS:  Patient seen and evaluated at bedside.  Pt is resting comfortable in NAD. Denies N/V/F/C.    Allergies    atorvastatin (Muscle Pain (Severe))    Intolerances        Vital Signs Last 24 Hrs  T(C): 37.2 (18 Jan 2025 10:59), Max: 37.2 (18 Jan 2025 10:59)  T(F): 98.9 (18 Jan 2025 10:59), Max: 98.9 (18 Jan 2025 10:59)  HR: 83 (18 Jan 2025 10:59) (78 - 89)  BP: 131/82 (18 Jan 2025 10:59) (131/82 - 158/102)  BP(mean): --  RR: 18 (18 Jan 2025 10:59) (18 - 20)  SpO2: 97% (18 Jan 2025 10:59) (93% - 100%)    Parameters below as of 18 Jan 2025 10:59  Patient On (Oxygen Delivery Method): room air        LABS:                        15.2   11.40 )-----------( 271      ( 18 Jan 2025 08:54 )             46.7     01-18    142  |  104  |  40[H]  ----------------------------<  79  4.9   |  24  |  1.98[H]    Ca    9.9      18 Jan 2025 08:54  Phos  3.9     01-18  Mg     2.3     01-18    TPro  6.4  /  Alb  3.9  /  TBili  2.3[H]  /  DBili  x   /  AST  53[H]  /  ALT  47[H]  /  AlkPhos  185[H]  01-18    PT/INR - ( 18 Jan 2025 08:54 )   PT: 19.0 sec;   INR: 1.68 ratio         PTT - ( 18 Jan 2025 08:54 )  PTT:39.1 sec  Urinalysis Basic - ( 18 Jan 2025 08:54 )    Color: x / Appearance: x / SG: x / pH: x  Gluc: 79 mg/dL / Ketone: x  / Bili: x / Urobili: x   Blood: x / Protein: x / Nitrite: x   Leuk Esterase: x / RBC: x / WBC x   Sq Epi: x / Non Sq Epi: x / Bacteria: x      CAPILLARY BLOOD GLUCOSE      POCT Blood Glucose.: 82 mg/dL (18 Jan 2025 11:36)  POCT Blood Glucose.: 76 mg/dL (18 Jan 2025 07:49)      Lower Extremity Physical Exam:  Vascular: DP/PT 0/4, B/L, CFT <3 seconds B/L, Temperature gradient warm to cool, B/L.   Neuro: Epicritic sensation diminished to the level of digits, B/L.  Musculoskeletal/Ortho: unremarkable  Skin: Left foot dorsal midfoot wound to dermis with erythema extending from the base of digits 2-4 to the proximal midfoot, no drainage, no malodor. Mild improvement of erythema with elevation. Right foot no open wounds, no acute signs of infection.    RADIOLOGY & ADDITIONAL TESTS:

## 2025-01-18 NOTE — PROGRESS NOTE ADULT - ASSESSMENT
69M presents for left foot wound to dermis with cellulitis  -Pt was seen and evaluated  -Afebrile, WBC 11.40  -Left foot dorsal midfoot wound to dermis with erythema extending from the base of digits 2-4 to the proximal midfoot, no drainage, no malodor. Mild improvement of erythema with elevation. Right foot no open wounds, no acute signs of infection.  -Left foot X-ray read: no OM, no gas  -No culture taken 2/2 no culturable material  - Recommend ID consult   - Recommend Vasc consult. Patient follows up with Dr. Calle   - Wound dressing daily changed with mupirocin, 4x4 gauze and bill  - Pt is stable for discharge from podiatry standpoint pending final ID recs/ final vasc recs   - Wound care instruction and follow up information in discharge note provider   - Discussed with attending

## 2025-01-18 NOTE — DISCHARGE NOTE PROVIDER - NSDCMRMEDTOKEN_GEN_ALL_CORE_FT
acetaminophen 500 mg oral tablet: 2 tab(s) orally 2 times a day as needed  clopidogrel 75 mg oral tablet: 1 tab(s) orally once a day  dapagliflozin 10 mg oral tablet: 1 tab(s) orally once a day  furosemide 40 mg oral tablet: 1 tab(s) orally 2 times a day  insulin glargine 100 units/mL subcutaneous solution: 24 unit(s) subcutaneous once a day (at bedtime)  insulin lispro 100 units/mL injectable solution: 16 unit(s) subcutaneous 3 times a day (before meals)  isosorbide dinitrate 30 mg oral tablet: 1 tab(s) orally 3 times a day  metoprolol succinate 50 mg oral tablet, extended release: 1 tab(s) orally once a day  potassium chloride 20 mEq oral tablet, extended release: 1 tab(s) orally once a day  spironolactone 25 mg oral tablet: 2 tab(s) orally once a day  Xarelto 15 mg oral tablet: 1 tab(s) orally once a day   acetaminophen 325 mg oral tablet: 2 tab(s) orally every 6 hours As needed Temp greater or equal to 38C (100.4F), Mild Pain (1 - 3)  clopidogrel 75 mg oral tablet: 1 tab(s) orally once a day  dapagliflozin 10 mg oral tablet: 1 tab(s) orally once a day  furosemide 40 mg oral tablet: 1 tab(s) orally 2 times a day  insulin glargine 100 units/mL subcutaneous solution: 24 unit(s) subcutaneous once a day (at bedtime)  insulin lispro 100 units/mL injectable solution: 16 unit(s) subcutaneous 3 times a day (before meals)  isosorbide dinitrate 10 mg oral tablet: 1 tab(s) orally 3 times a day  metoprolol succinate 50 mg oral tablet, extended release: 1 tab(s) orally once a day  potassium chloride 20 mEq oral tablet, extended release: 1 tab(s) orally once a day  rivaroxaban 15 mg oral tablet: 1 tab(s) orally once a day (before a meal)  spironolactone 25 mg oral tablet: 2 tab(s) orally once a day   amoxicillin-clavulanate 875 mg-125 mg oral tablet: 1 tab(s) orally every 12 hours  clopidogrel 75 mg oral tablet: 1 tab(s) orally once a day  dapagliflozin 10 mg oral tablet: 1 tab(s) orally once a day  doxycycline monohydrate 100 mg oral tablet: 1 tab(s) orally every 12 hours  furosemide 40 mg oral tablet: 1 tab(s) orally once a day  insulin glargine 100 units/mL subcutaneous solution: 24 unit(s) subcutaneous once a day (at bedtime)  insulin lispro 100 units/mL injectable solution: 16 unit(s) subcutaneous 3 times a day (before meals)  isosorbide dinitrate 10 mg oral tablet: 1 tab(s) orally 3 times a day  metoprolol succinate 50 mg oral tablet, extended release: 1 tab(s) orally once a day  mupirocin 2% topical ointment: Apply topically to affected area 2 times a day  potassium chloride 20 mEq oral tablet, extended release: 1 tab(s) orally once a day  rivaroxaban 15 mg oral tablet: 1 tab(s) orally once a day (before a meal)  spironolactone 25 mg oral tablet: 2 tab(s) orally once a day   amoxicillin-clavulanate 875 mg-125 mg oral tablet: 1 tab(s) orally every 12 hours  clopidogrel 75 mg oral tablet: 1 tab(s) orally once a day  dapagliflozin 10 mg oral tablet: 1 tab(s) orally once a day  doxycycline monohydrate 100 mg oral tablet: 1 tab(s) orally every 12 hours  furosemide 40 mg oral tablet: 1 tab(s) orally once a day  insulin glargine 100 units/mL subcutaneous solution: 10 unit(s) subcutaneous once a day (at bedtime)  insulin lispro 100 units/mL injectable solution: 11 unit(s) subcutaneous 3 times a day (before meals)  isosorbide dinitrate 10 mg oral tablet: 1 tab(s) orally 3 times a day  metoprolol succinate 50 mg oral tablet, extended release: 1 tab(s) orally once a day  mupirocin 2% topical ointment: Apply topically to affected area 2 times a day  potassium chloride 20 mEq oral tablet, extended release: 1 tab(s) orally once a day  rivaroxaban 15 mg oral tablet: 1 tab(s) orally once a day (before a meal)  spironolactone 25 mg oral tablet: 2 tab(s) orally once a day   amoxicillin-clavulanate 875 mg-125 mg oral tablet: 1 tab(s) orally every 12 hours  clopidogrel 75 mg oral tablet: 1 tab(s) orally once a day  dapagliflozin 10 mg oral tablet: 1 tab(s) orally once a day  doxycycline monohydrate 100 mg oral tablet: 1 tab(s) orally every 12 hours  furosemide 40 mg oral tablet: 1 tab(s) orally once a day  insulin glargine 100 units/mL subcutaneous solution: 10 unit(s) subcutaneous once a day (at bedtime)  insulin lispro 100 units/mL injectable solution: 11 unit(s) subcutaneous 3 times a day (before meals)  isosorbide dinitrate 10 mg oral tablet: 1 tab(s) orally 3 times a day  metoprolol succinate 50 mg oral tablet, extended release: 1 tab(s) orally once a day  mupirocin 2% topical ointment: Apply topically to affected area 2 times a day  Outpatient Physical Therapy: Outpatient Physical Therapy  3 sessions per week for 4 weeks  ICD 10: R29.6 Repeated Falls  potassium chloride 20 mEq oral tablet, extended release: 1 tab(s) orally once a day  rivaroxaban 15 mg oral tablet: 1 tab(s) orally once a day (before a meal)  spironolactone 25 mg oral tablet: 2 tab(s) orally once a day   amoxicillin-clavulanate 875 mg-125 mg oral tablet: 1 tab(s) orally every 12 hours  clopidogrel 75 mg oral tablet: 1 tab(s) orally once a day  dapagliflozin 10 mg oral tablet: 1 tab(s) orally once a day  furosemide 40 mg oral tablet: 1 tab(s) orally once a day  insulin glargine 100 units/mL subcutaneous solution: 10 unit(s) subcutaneous once a day (at bedtime)  insulin lispro 100 units/mL injectable solution: 11 unit(s) subcutaneous 3 times a day (before meals)  isosorbide dinitrate 10 mg oral tablet: 1 tab(s) orally 3 times a day  metoprolol succinate 50 mg oral tablet, extended release: 1 tab(s) orally once a day  mupirocin 2% topical ointment: Apply topically to affected area 2 times a day  potassium chloride 20 mEq oral tablet, extended release: 1 tab(s) orally once a day  rivaroxaban 15 mg oral tablet: 1 tab(s) orally once a day (before a meal)  spironolactone 25 mg oral tablet: 2 tab(s) orally once a day   amoxicillin-clavulanate 875 mg-125 mg oral tablet: 1 tab(s) orally every 12 hours  clopidogrel 75 mg oral tablet: 1 tab(s) orally once a day  dapagliflozin 10 mg oral tablet: 1 tab(s) orally once a day  furosemide 40 mg oral tablet: 1 tab(s) orally once a day  insulin glargine 100 units/mL subcutaneous solution: 10 unit(s) subcutaneous once a day (at bedtime)  insulin lispro 100 units/mL injectable solution: 11 unit(s) subcutaneous 3 times a day (before meals)  isosorbide dinitrate 10 mg oral tablet: 1 tab(s) orally 3 times a day  metoprolol succinate 50 mg oral tablet, extended release: 1 tab(s) orally once a day  mupirocin 2% topical ointment: Apply topically to affected area 2 times a day  oseltamivir 30 mg oral capsule: 1 cap(s) orally once a day Please take Tamiflu for 4 more days  potassium chloride 20 mEq oral tablet, extended release: 1 tab(s) orally once a day  rivaroxaban 15 mg oral tablet: 1 tab(s) orally once a day (before a meal)  sodium bicarbonate 650 mg oral tablet: 1 tab(s) orally 3 times a day  spironolactone 25 mg oral tablet: 2 tab(s) orally once a day   dapagliflozin 10 mg oral tablet: 1 tab(s) orally once a day  furosemide 40 mg oral tablet: 1 tab(s) orally once a day  insulin glargine 100 units/mL subcutaneous solution: 10 unit(s) subcutaneous once a day (at bedtime)  insulin lispro 100 units/mL injectable solution: 11 unit(s) subcutaneous 3 times a day (before meals)  isosorbide dinitrate 10 mg oral tablet: 1 tab(s) orally 3 times a day  metoprolol succinate 25 mg oral tablet, extended release: 3 tab(s) orally once a day  oseltamivir 30 mg oral capsule: 1 cap(s) orally once a day  pentoxifylline 400 mg oral tablet, extended release: 1 tab(s) orally 2 times a day  rivaroxaban 15 mg oral tablet: 1 tab(s) orally once a day (before a meal)  sodium bicarbonate 650 mg oral tablet: 1 tab(s) orally 3 times a day  spironolactone 25 mg oral tablet: 2 tab(s) orally once a day

## 2025-01-18 NOTE — H&P ADULT - PROBLEM SELECTOR PLAN 8
Patient w/ afib on admission   c/w home Toprol 50 QD and Xarelto 15 QD Patient w/ known hx of Afib but ekg consistent w/ Atrial flutter on admission    c/w home Toprol 50 QD and Xarelto 15 QD  -f/u w/ EP outpatient for A-flutter

## 2025-01-18 NOTE — H&P ADULT - ASSESSMENT
68 y/o male with hx of CAD s/p 10 stents, HFrEF (EF~50% as of 11/26/23), A-fib, HTN, HLD, T2DM on insulin, CKD3 here for evaluation and management of dyspnea on exertion and left foot wound  70 y/o male with hx of CAD s/p 10 stents, HFrEF (EF~45% as of 11/26/23), A-fib on xarelto, HTN, HLD, T2DM on insulin, CKD3 pw SOB/CLEARY for past few weeks and L foot cellulitis

## 2025-01-18 NOTE — H&P ADULT - PROBLEM SELECTOR PLAN 9
DVT PPx: Xarelto  Diet: CC DASH diet 1.5L fluid restrict   PT/OT: PT eval   Code Status: Full Code   Dispo: Pending DVT PPx: Xarelto  Diet: CC DASH diet   PT/OT: PT eval   Code Status: Full Code   Dispo: Pending

## 2025-01-18 NOTE — H&P ADULT - HISTORY OF PRESENT ILLNESS
68 y/o male with hx of CAD s/p 10 stents, HFrEF (EF~50% as of 11/26/23), A-fib, HTN, HLD, T2DM on insulin, CKD3 coming in to ED w/ SOB for past few weeks and L foot wound. The patient had a scratch on the sole of his left foot 1 week ago and which opened up. He went to his pcp and was sent into the ED by his PCP. He denies any fevers or chills but reports increasing redness and warmth of his leg.   He has also had increasing dyspnea on mild exertion over the past couple weeks and gets winded even with going up a flight of stairs. At the ED the patient's VSS on RA. He was admitted for further workup of dyspnea on exertion and management of his left foot wound.    70 y/o male with hx of CAD s/p 10 stents, HFrEF (EF~45% as of 11/26/23), A-fib on xarelto, HTN, HLD, T2DM on insulin, CKD3 pw SOB/CLEARY for past few weeks and L foot wound.     The patient had a scratch on the sole of his left foot 1 week ago and which opened up. He went to his pcp and was sent into the ED by his PCP. He denies any fevers or chills but reports increasing redness and warmth of his leg.   He has also had increasing dyspnea on mild exertion over the past couple weeks and gets winded even with going up a flight of stairs. At the ED the patient's VSS on RA. He was admitted for further workup of dyspnea on exertion and management of his left foot wound.

## 2025-01-18 NOTE — CONSULT NOTE ADULT - SUBJECTIVE AND OBJECTIVE BOX
Patient is a 69y old  Male who presents with a chief complaint of left foot wound w/ cellulitis    HPI: 69 M w/ ·PMH of CHF, HTN, HLD, type 2 DM, CAD s/p stents x10, presenting to ED c/o shortness of breath for a few weeks,  and left foot wound pt w/ wound on the L foot, pt states no fevers, no chills, increasing redness of the leg, pt w/ increasing sob w/ exertion. pt sent by pcp for possible infection pt not on antibiotics.      PAST MEDICAL & SURGICAL HISTORY:  Former smoker      CAD in native artery      Type 2 diabetes mellitus      Hyperlipidemia, unspecified hyperlipidemia type      Essential hypertension, hypertension with unspecified goal      Afib      Hematoma of leg      Stented coronary artery      CHF (congestive heart failure)      s/p Carotid Endarterectomy  left          MEDICATIONS  (STANDING):    MEDICATIONS  (PRN):      Allergies    atorvastatin (Muscle Pain (Severe))    Intolerances        VITALS:    Vital Signs Last 24 Hrs  T(C): 36.6 (17 Jan 2025 18:40), Max: 36.6 (17 Jan 2025 18:40)  T(F): 97.8 (17 Jan 2025 18:40), Max: 97.8 (17 Jan 2025 18:40)  HR: 89 (17 Jan 2025 21:35) (78 - 89)  BP: 135/83 (17 Jan 2025 21:35) (135/83 - 158/102)  BP(mean): --  RR: 18 (17 Jan 2025 21:35) (18 - 20)  SpO2: 98% (17 Jan 2025 21:35) (98% - 100%)    Parameters below as of 17 Jan 2025 21:35  Patient On (Oxygen Delivery Method): room air        LABS:                          16.0   10.28 )-----------( 309      ( 17 Jan 2025 19:13 )             45.8       01-17    134[L]  |  100  |  46[H]  ----------------------------<  151[H]  4.4   |  16[L]  |  1.75[H]    Ca    9.1      17 Jan 2025 19:13    TPro  6.4  /  Alb  4.0  /  TBili  1.5[H]  /  DBili  x   /  AST  64[H]  /  ALT  51[H]  /  AlkPhos  214[H]  01-17      CAPILLARY BLOOD GLUCOSE          PT/INR - ( 17 Jan 2025 19:13 )   PT: 23.9 sec;   INR: 2.09 ratio         PTT - ( 17 Jan 2025 19:13 )  PTT:41.0 sec    LOWER EXTREMITY PHYSICAL EXAM:    Vascular: DP/PT 2/4, B/L, CFT <3 seconds B/L, Temperature gradient warm to cool, B/L.   Neuro: Epicritic sensation diminished to the level of digits, B/L.  Musculoskeletal/Ortho: unremarkable  Skin: Left foot dorsal midfoot wound to dermis with erythema extending from the base of digits 2-4 to the proximal midfoot, no drainage, no malodor. Right foot no open wounds, no acute signs of infection.      RADIOLOGY & ADDITIONAL STUDIES:  < from: Xray Foot AP + Lateral + Oblique, Left (01.17.25 @ 18:46) >    ACC: [id number] EXAM:  XR FOOT COMP MIN 3 VIEWS LT   ORDERED BY: TONNY MARTINEZ     ACC: [id number] EXAM:  XR ANKLE COMP MIN 3 VIEWS LT   ORDERED BY: TONNY MARTINEZ     PROCEDURE DATE:  01/17/2025          INTERPRETATION:  CLINICAL INDICATION: Foot wound.  TECHNIQUE: 3 views left ankle. 3 views of the left foot.    COMPARISON: Radiographs of the left foot and ankle 10/6/2019.    FINDINGS:    No acute fracture. No cortical erosion or periosteal new bone formation.   No dislocation.  Accessory ossicle along the medial aspect of the talar head is unchanged.   Enthesopathy at the Achilles tendon insertion. Plantar calcaneal bone   spur. Joint spaces are maintained. No radiopaque foreign body. No   subcutaneous emphysema.  Peripheral vascular calcifications.      IMPRESSION:  No radiographic evidence of osteomyelitis.  No acute fracture or dislocation.    --- End of Report ---          PAL CAMPO MD; Resident Radiologist  This document has been electronically signed.  AGUS MATAMOROS MD; Attending Radiologist  This document has been electronically signed. Jan 17 2025  7:29PM    < end of copied text >

## 2025-01-18 NOTE — DISCHARGE NOTE PROVIDER - NSDCFUSCHEDAPPT_GEN_ALL_CORE_FT
Tyrone Calle  [facility] Physician [facility]  VASCULAR [address]  Scheduled Appointment: 02/18/2025

## 2025-01-18 NOTE — PROGRESS NOTE ADULT - ATTENDING COMMENTS
Patient is a 69M with h/o CAD s/p 10 stents, HFrEF (EF~50% as of 11/26/23), A-fib, HTN, HLD, T2DM on insulin, CKD3 here for evaluation and management of dyspnea on exertion and left foot wound, Afebrile, no chest pain, found to have elevated Trops, BNP. Card, podiatry evaluated    1. Acute on chronic systolic HF  2. L foot infection  3. CAD, s/p PCI  4. Chronic AF on Eliquis  5. T2DM    - No chest pain, fever, VSS, no Tele events  - Reviewed labs, imaging  - appreciated Cardiology plans- c/w Lasix 40mg IV q 12 hrs, c/w Metoprolol, ISDN, Aldactone, daily I/O  - Podiatry plans noted- on IV zosyn, NIKHIL ordered  - Vascular Sx plans  - Fifi SS  - PT     Time spent 52 min excluding house staff teaching

## 2025-01-18 NOTE — CONSULT NOTE ADULT - ASSESSMENT
69M presents for left foot wound to dermis with cellulitis  -Pt was seen and evaluated  -Afebrile, WBC 10.28, ESR pending, CRP 1.2  -Left foot dorsal midfoot wound to dermis with erythema extending from the base of digits 2-4 to the proximal midfoot, no drainage, no malodor. Right foot no open wounds, no acute signs of infection.  -Left foot X-ray read: no OM, no gas  -No culture taken 2/2 no culturable material  -Erythema demarcated  -Recommend unasyn if inpatient, transition to PO augmentin upon discharge for a total of 14 days of treatment.  -Pt is stable for discharge from podiatry standpoint.  -Follow up with Dr Gibson within 1 week of discharge  -Please call [phone]  -Discussed with attending   69M presents for left foot wound to dermis with cellulitis  -Pt was seen and evaluated  -Afebrile, WBC 10.28, ESR pending, CRP 1.2  -Left foot dorsal midfoot wound to dermis with erythema extending from the base of digits 2-4 to the proximal midfoot, no drainage, no malodor. Right foot no open wounds, no acute signs of infection.  -Left foot X-ray read: no OM, no gas  -No culture taken 2/2 no culturable material  -Erythema demarcated  -Recommend unasyn if inpatient, transition to PO augmentin upon discharge for a total of 14 days of treatment.  -Recommend mupirocin  -Wound dressing daily changed with mupirocin, 4x4 gauze and bill  -Pt is stable for discharge from podiatry standpoint.  -Follow up with Dr Gibson within 1 week of discharge  -Please call [phone]  -Discussed with attending

## 2025-01-18 NOTE — DISCHARGE NOTE PROVIDER - HOSPITAL COURSE
HPI:  68 y/o male with hx of CAD s/p 10 stents, HFrEF (EF~50% as of 11/26/23), A-fib, HTN, HLD, T2DM on insulin, CKD3 coming in to ED w/ SOB for past few weeks and L foot wound. The patient had a scratch on the sole of his left foot 1 week ago and which opened up. He went to his pcp and was sent into the ED by his PCP. He denies any fevers or chills but reports increasing redness and warmth of his leg.   He has also had increasing dyspnea on mild exertion over the past couple weeks and gets winded even with going up a flight of stairs. At the ED the patient's VSS on RA. He was admitted for further workup of dyspnea on exertion and management of his left foot wound.     Hospital Course:  Started zosyn for L foot cellulitis, Pro-BNP elevated at [address], Troponin down trending from 376 to 310. TTE shows *****. Pt received lasix 40 IV for diuresis given bilateral lower extremity edema and SOB.     Important Medication Changes and Reason:  STARTED:  CHANGED:  STOPPED:    Active or Pending Issues Requiring Follow-up:    Advanced Directives:   [ X] Full code  [ ] DNR  [ ] Hospice    Discharge Diagnoses:         HPI:  68 y/o male with hx of CAD s/p 10 stents, HFrEF (EF~50% as of 11/26/23), A-fib, HTN, HLD, T2DM on insulin, CKD3 coming in to ED w/ SOB for past few weeks and L foot wound. The patient had a scratch on the sole of his left foot 1 week ago and which opened up. He went to his pcp and was sent into the ED by his PCP. He denies any fevers or chills but reports increasing redness and warmth of his leg.   He has also had increasing dyspnea on mild exertion over the past couple weeks and gets winded even with going up a flight of stairs. At the ED the patient's VSS on RA. He was admitted for further workup of dyspnea on exertion and management of his left foot wound.     Hospital Course:  This 69-year-old male with a history of CAD (status post multiple stents), heart failure with reduced ejection fraction (EF ~50% as of November 2023), atrial fibrillation, hypertension, hyperlipidemia, type 2 diabetes, chronic kidney disease stage 3, and former smoking presented with bilateral lower extremity pain. Ultrasound revealed severe bilateral peripheral artery disease, with right popliteal and posterior tibial peroneal trunk occlusions. He also experienced dyspnea on exertion for two weeks, attributed to worsening CHF, with a significantly elevated proBNP. He was found to have cellulitis of the left foot, likely complicated by his PAD. His diabetes was uncontrolled, requiring insulin adjustment. His troponin was elevated, likely due to demand ischemia and reduced renal clearance. He also had acute on chronic congestive heart failure, with a new EF of 40-45% on TTE. His chronic kidney disease was noted with an elevated creatinine and metabolic acidosis. His hypertension was managed medically. His atrial fibrillation/flutter was also managed medically, and he will require outpatient follow-up with electrophysiology. Treatment included diuresis for CHF, antibiotics for cellulitis, insulin adjustment for diabetes, continuation of his antiplatelet therapy for CAD, afterload reduction for heart failure, sodium bicarbonate for acidosis, and anticoagulation for atrial fibrillation/flutter. He will follow up with vascular surgery, cardiology, and podiatry as an outpatient. Physical and occupational therapy were also consulted.  He was given IV Unasyn in the hospital and will take augmentin BID for two more days on discharge.       Advanced Directives:   [ X] Full code  [ ] DNR  [ ] Hospice    Discharge Diagnoses:  (Admit Diagnosis) Heart failure       HPI:  Patient is a 70 y/o male with hx of CAD s/p 10 stents, HFrEF (EF~50% as of 11/26/23), A-fib, HTN, HLD, T2DM on insulin, CKD3 coming in to ED w/ SOB for past few weeks and L foot wound. The patient had a scratch on the sole of his left foot 1 week ago and which opened up. He went to his pcp and was sent into the ED by his PCP. He denies any fevers or chills but reports increasing redness and warmth of his leg.   He has also had increasing dyspnea on mild exertion over the past couple weeks and gets winded even with going up a flight of stairs. At the ED the patient's VSS on RA. He was admitted for further workup of dyspnea on exertion and management of his left foot wound.     Hospital Course:  This 69-year-old male with a history of CAD (status post multiple stents), heart failure with reduced ejection fraction (EF ~50% as of November 2023), atrial fibrillation, hypertension, hyperlipidemia, type 2 diabetes, chronic kidney disease stage 3, and former smoking presented with bilateral lower extremity pain. Ultrasound revealed severe bilateral peripheral artery disease, with right popliteal and posterior tibial peroneal trunk occlusions. He also experienced dyspnea on exertion for two weeks, attributed to worsening CHF, with a significantly elevated proBNP. He was found to have cellulitis of the left foot, likely complicated by his PAD. His diabetes was uncontrolled, requiring insulin adjustment. His troponin was elevated, likely due to demand ischemia and reduced renal clearance. He also had acute on chronic congestive heart failure, with a new EF of 40-45% on TTE. His chronic kidney disease was noted with an elevated creatinine and metabolic acidosis. His hypertension was managed medically. His atrial fibrillation/flutter was also managed medically, and he will require outpatient follow-up with electrophysiology. Treatment included diuresis for CHF, antibiotics for cellulitis, insulin adjustment for diabetes, continuation of his antiplatelet therapy for CAD, afterload reduction for heart failure, sodium bicarbonate for acidosis, and anticoagulation for atrial fibrillation/flutter. He will follow up with vascular surgery, cardiology, and podiatry as an outpatient. Physical and occupational therapy were also consulted.  He was given IV Unasyn in the hospital and will take augmentin BID for two more days on discharge.       Advanced Directives:   [ X] Full code  [ ] DNR  [ ] Hospice    Discharge Diagnoses:  (Admit Diagnosis) Heart failure       HPI:  Patient is a 70 y/o male with hx of CAD s/p 10 stents, HFrEF (EF~50% as of 11/26/23), A-fib, HTN, HLD, T2DM on insulin, CKD3 coming in to ED w/ SOB for past few weeks and L foot wound. The patient had a scratch on the sole of his left foot 1 week ago and which opened up. He went to his pcp and was sent into the ED by his PCP. He denies any fevers or chills but reports increasing redness and warmth of his leg.   He has also had increasing dyspnea on mild exertion over the past couple weeks and gets winded even with going up a flight of stairs. At the ED the patient's VSS on RA. He was admitted for further workup of dyspnea on exertion and management of his left foot wound.     Hospital Course:  This 69-year-old male with a history of CAD (status post multiple stents), heart failure with reduced ejection fraction (EF ~50% as of November 2023), atrial fibrillation, hypertension, hyperlipidemia, type 2 diabetes, chronic kidney disease stage 3, and former smoking presented with bilateral lower extremity pain. Ultrasound revealed severe bilateral peripheral artery disease, with right popliteal and posterior tibial peroneal trunk occlusions. He also experienced dyspnea on exertion for two weeks, attributed to worsening CHF, with a significantly elevated proBNP. He was found to have cellulitis of the left foot, likely complicated by his PAD. His diabetes was uncontrolled, requiring insulin adjustment. His troponin was elevated, likely due to demand ischemia and reduced renal clearance. He also had acute on chronic congestive heart failure, with a new EF of 40-45% on TTE. His chronic kidney disease was noted with an elevated creatinine and metabolic acidosis. His hypertension was managed medically. His atrial fibrillation/flutter was also managed medically, and he will require outpatient follow-up with electrophysiology. Treatment included diuresis for CHF, antibiotics for cellulitis, insulin adjustment for diabetes, continuation of his antiplatelet therapy for CAD, afterload reduction for heart failure, sodium bicarbonate for acidosis, and anticoagulation for atrial fibrillation/flutter. He will follow up with vascular surgery, cardiology, and podiatry as an outpatient. Physical and occupational therapy were also consulted.  He was given IV Unasyn in the hospital and will take augmentin BID for two more days on discharge.       Advanced Directives:   [ X] Full code  [ ] DNR  [ ] Hospice    Discharge Diagnoses:  (Admit Diagnosis) Heart failure  Cellulitis       HPI:  Patient is a 68 y/o male with hx of CAD s/p 10 stents, HFrEF (EF~50% as of 11/26/23), A-fib, HTN, HLD, T2DM on insulin, CKD3 coming in to ED w/ SOB for past few weeks and L foot wound. The patient had a scratch on the sole of his left foot 1 week ago and which opened up. He went to his pcp and was sent into the ED by his PCP. He denies any fevers or chills but reports increasing redness and warmth of his leg.   He has also had increasing dyspnea on mild exertion over the past couple weeks and gets winded even with going up a flight of stairs. At the ED the patient's VSS on RA. He was admitted for further workup of dyspnea on exertion and management of his left foot wound.     Hospital Course:  This 69-year-old male with a history of CAD (status post multiple stents), heart failure with reduced ejection fraction (EF ~50% as of November 2023), atrial fibrillation, hypertension, hyperlipidemia, type 2 diabetes, chronic kidney disease stage 3, and former smoking presented with bilateral lower extremity pain. Ultrasound revealed severe bilateral peripheral artery disease, with right popliteal and posterior tibial peroneal trunk occlusions. He also experienced dyspnea on exertion for two weeks, attributed to worsening CHF, with a significantly elevated proBNP. He was found to have cellulitis of the left foot, likely complicated by his PAD. His diabetes was uncontrolled, requiring insulin adjustment. His troponin was elevated, likely due to demand ischemia and reduced renal clearance. He also had acute on chronic congestive heart failure, with a new EF of 40-45% on TTE. His chronic kidney disease was noted with an elevated creatinine and metabolic acidosis. His hypertension was managed medically. His atrial fibrillation/flutter was also managed medically, and he will require outpatient follow-up with cardiology. Treatment included diuresis for CHF, antibiotics for cellulitis, insulin adjustment for diabetes, continuation of his antiplatelet therapy for CAD, afterload reduction for heart failure, sodium bicarbonate for acidosis, and anticoagulation for atrial fibrillation/flutter. LE wound, s/p LE angio Thurs 1/23, started on trental. will follow up with vascular surgeon and podiatry outpatient.  Physical and occupational therapy were also consulted.  He was given IV Unasyn in the hospital and completed the course with augmentin PO. Prior to discharge patient felt weak and found to have the Flu, started on tamiflu. patient medically cleared for discharge      Advanced Directives:   [ X] Full code  [ ] DNR  [ ] Hospice    Discharge Diagnoses:  (Admit Diagnosis) Heart failure  Cellulitis

## 2025-01-18 NOTE — DISCHARGE NOTE PROVIDER - NSDCFUADDAPPT_GEN_ALL_CORE_FT
APPTS ARE READY TO BE MADE: [ ] YES    Best Family or Patient Contact (if needed):    Additional Information about above appointments (if needed):    1: Cardiology  2: Vascular   3:     Other comments or requests:    APPTS ARE READY TO BE MADE: [X] YES    Best Family or Patient Contact (if needed):    Additional Information about above appointments (if needed):    1: Cardiology  2: Vascular   3: Podiatry - Dr Gibson within 1 week of discharge ([phone])     Other comments or requests:    APPTS ARE READY TO BE MADE: [X] YES    Best Family or Patient Contact (if needed):    Additional Information about above appointments (if needed):    1: Cardiology  2: Vascular   3: Podiatry - Dr Gibson within 1 week of discharge ([phone])   4:   Other comments or requests:    APPTS ARE READY TO BE MADE: [X] YES    Best Family or Patient Contact (if needed):    Additional Information about above appointments (if needed):    1: Cardiology  2: Vascular   3: Podiatry - Dr Gibson within 1 week of discharge ([phone])   4:   Other comments or requests:     Patient advised they did not want to proceed with scheduling appointments with the providers on their referrals. They will coordinate care on their own.

## 2025-01-18 NOTE — H&P ADULT - ATTENDING COMMENTS
Pt was seen and examined during key portion of E/M service. Case discussed with resident. H&P reviewed and edited where appropriate. Other than the following, I agree with the above history, exam, assessment, and plan.  68 y/o male with hx of CAD s/p 10 stents, HFrEF (EF~45% as of 11/26/23), A-fib on xarelto, HTN, HLD, T2DM on insulin, CKD3 pw SOB/CLEARY for past few weeks and L foot cellulitis.  cont zosyn. card consult for additional ischemic workup. trend cr, CE. tele. TTE. lasix 40 IV BID. cont xarelto.

## 2025-01-18 NOTE — H&P ADULT - PROBLEM SELECTOR PLAN 5
LVEF 50% in 11/2023; concern for worsening cardiac function given worsening fluid retention and dyspnea     Plan:   f/u TTE   Isosorbide dinitrate for afterload reduction, Toprol 50 QD, Aldactone 50 QD, Farxiga QD   Hold home PO Lasix 40 BID and give IV lasix 80 BID LVEF 50% in 11/2023; concern for worsening cardiac function given worsening fluid retention and dyspnea     Plan:   f/u TTE   Isosorbide dinitrate for afterload reduction, Toprol 50 QD, Aldactone 50 QD  Hold home Farxiga  Hold home PO Lasix 40 BID and give IV lasix 40 BID LVEF 45in 11/2023; concern for worsening cardiac function given worsening fluid retention and dyspnea     Plan:   f/u TTE   Isosorbide dinitrate for afterload reduction, Toprol 50 QD, Aldactone 50 QD  Hold home Farxiga  Hold home PO Lasix 40 BID and give IV lasix 40 BID

## 2025-01-18 NOTE — H&P ADULT - PROBLEM SELECTOR PLAN 1
~1mo hx of dyspnea on exertion limiting daily activities   Extensive cardiac hx including cad s/p 10 stents, HFrEF w/ LVEF 50% in 11/2023   ProBNP elevated to 20502, appears volume overloaded on exam  Hx consistent w/ worsening CHF vs. cardiac ischemia rather than infectious etiology   Clear lungs on CXR   Negative Flu/Cov/RSV  s/p 80mg IVP in ED     Plan:   -f/u TTE   -Supplemental O2 as needed   -f/u full RVP   -Lasix 80mg IV BID for diuresis   -Strict I&O w/ goal net negative 1-2L   -Daily standing weights ~1mo hx of dyspnea on exertion limiting daily activities   Extensive cardiac hx including cad s/p 10 stents, HFrEF w/ LVEF 50% in 11/2023   ProBNP elevated to 07689, appears volume overloaded on exam  Hx consistent w/ worsening CHF vs. cardiac ischemia rather than infectious etiology   Clear lungs on CXR   Negative Flu/Cov/RSV  s/p 80mg IVP in ED     Plan:   -f/u TTE   -Supplemental O2 as needed   -f/u full RVP   -Lasix 80mg IV BID for diuresis   -Strict I&O w/ goal net negative 1-2L   -Fluid restrict 1.5L daily   -Daily standing weights ~1mo hx of dyspnea on exertion limiting daily activities   Extensive cardiac hx including cad s/p 10 stents, HFrEF w/ LVEF 50% in 11/2023   ProBNP elevated to 13725, appears volume overloaded on exam  Hx consistent w/ worsening CHF vs. cardiac ischemia rather than infectious etiology   Clear lungs on CXR   Negative Flu/Cov/RSV  s/p 80mg IVP in ED     Plan:   -f/u TTE   -Supplemental O2 as needed   -f/u full RVP BCx x2  -Lasix 40mg IV BID for diuresis   -Strict I&O w/ goal net negative 1-2L   -Daily standing weights  -Cardiology consult in AM given extensive cardiac hx and worsening HF ~1mo hx of dyspnea on exertion limiting daily activities   Extensive cardiac hx including cad s/p 10 stents, HFrEF w/ LVEF 45% in 11/2023   ProBNP elevated to 72712, appears volume overloaded on exam  Hx consistent w/ worsening CHF vs. cardiac ischemia rather than infectious etiology   Clear lungs on CXR   Negative Flu/Cov/RSV  s/p 80mg IVP in ED     Plan:   -f/u TTE   -Supplemental O2 as needed   -f/u full RVP BCx x2  -Lasix 40mg IV BID for diuresis   -Strict I&O w/ goal net negative 1-2L   -Daily standing weights  -Cardiology consult in AM given extensive cardiac hx and CLEARY

## 2025-01-18 NOTE — H&P ADULT - PROBLEM SELECTOR PLAN 3
On Lantus 24U QHS and 16U TIDAC     Plan:   Lantus 18U QHS  Admelog 11U TIDAC   JOHNNY   CC DASH diet  Not on Statin due to severe myalgias On Lantus 24U QHS, 16U TIDAC and Farxiga at home  Previous admission while on 15U lantus has hypoglycemia to 39 in AM    Plan:   Lantus 10U QHS  Admelog 11U TIDAC   JOHNNY   CC DASH diet  Hold home Farxiga  Not on Statin due to severe myalgias

## 2025-01-18 NOTE — H&P ADULT - NSHPLABSRESULTS_GEN_ALL_CORE
LABS:                        16.0   10.28 )-----------( 309      ( 17 Jan 2025 19:13 )             45.8     01-17    134[L]  |  100  |  46[H]  ----------------------------<  151[H]  4.4   |  16[L]  |  1.75[H]    Ca    9.1      17 Jan 2025 19:13    TPro  6.4  /  Alb  4.0  /  TBili  1.5[H]  /  DBili  x   /  AST  64[H]  /  ALT  51[H]  /  AlkPhos  214[H]  01-17    PT/INR - ( 17 Jan 2025 19:13 )   PT: 23.9 sec;   INR: 2.09 ratio         PTT - ( 17 Jan 2025 19:13 )  PTT:41.0 sec      CARDIAC MARKERS ( 17 Jan 2025 21:48 )  310 ng/L / x     / x     / x     / x     / x      CARDIAC MARKERS ( 17 Jan 2025 19:13 )  376 ng/L / x     / x     / x     / x     / x          Urinalysis Basic - ( 17 Jan 2025 19:13 )    Color: x / Appearance: x / SG: x / pH: x  Gluc: 151 mg/dL / Ketone: x  / Bili: x / Urobili: x   Blood: x / Protein: x / Nitrite: x   Leuk Esterase: x / RBC: x / WBC x   Sq Epi: x / Non Sq Epi: x / Bacteria: x            EKG and RADIOLOGY:     Most recent EKG and Images Personally Reviewed   XR foot without evidence of OM   CXR clear lungs

## 2025-01-18 NOTE — DISCHARGE NOTE PROVIDER - PROVIDER TOKENS
PROVIDER:[TOKEN:[[id number]]],PROVIDER:[TOKEN:[[id number]],FOLLOWUP:[1 week]] PROVIDER:[TOKEN:[[id number]]],PROVIDER:[TOKEN:[[id number]],FOLLOWUP:[1 week]],PROVIDER:[TOKEN:[[id number]],FOLLOWUP:[1 week]] PROVIDER:[TOKEN:[[id number]],SCHEDULEDAPPT:[02/18/2025]],PROVIDER:[TOKEN:[[id number]],FOLLOWUP:[Routine]],PROVIDER:[TOKEN:[[id number]],FOLLOWUP:[2 weeks]],PROVIDER:[TOKEN:[[id number]],FOLLOWUP:[1 week]],PROVIDER:[TOKEN:[[id number]],FOLLOWUP:[1 week]]

## 2025-01-18 NOTE — DISCHARGE NOTE PROVIDER - CARE PROVIDER_API CALL
Tyrone Calle  Vascular Surgery  [address] [facility], [address]  [place] [address]  Phone: [phone]  Fax: [phone]  Follow Up Time:     Noa Carl  [address]  [place] [address]  Phone: [phone]  Fax: [phone]  Follow Up Time: 1 week   Tyrone Calle  Vascular Surgery  [address] [facility] [address], [address]  [place] [address]  Phone: [phone]  Fax: [phone]  Follow Up Time:     Noa Carl  [facility]  [address], [address]  [place] [address]  Phone: [phone]  Fax: [phone]  Follow Up Time: 1 week    Alfred Moon  Cardiology  [address] [place] [address]  Phone: [phone]  Fax: [phone]  Follow Up Time: 1 week   Tyrone Calle  Vascular Surgery  [address] [facility], [address]  [place] [address]  Phone: [phone]  Fax: [phone]  Scheduled Appointment: 02/18/2025    Noa Carl  Family Medicine  [address]  [place] [address]  Phone: [phone]  Fax: [phone]  Follow Up Time: Routine    Alfred Moon  Cardiology  [address] [place] [address]  Phone: [phone]  Fax: [phone]  Follow Up Time: 2 weeks    Bebeto Gibson  Foot and Ankle Surgery  [address], [place] [address]  Phone: [phone]  Fax: [phone]  Follow Up Time: 1 week    Markell Walton  Nephrology  [address]  [place] [address]  Phone: [phone]  Fax: [phone]  Follow Up Time: 1 week

## 2025-01-18 NOTE — PROGRESS NOTE ADULT - PROBLEM SELECTOR PLAN 2
1 week hx of L foot wound, seen by Podiatry in ED   L foot wound to dermis w/ cellulitis  Hx of significant PAD   XR foot ankle without signs of OM or gas, ESR CRP WBC WNL and afebrile   Lactate elevated to 2.8   s/p Zosyn and Vancomycin in ED     Plan:   C/w Zosyn 1/17-   DC Vancomycin, as wound not purulent and less likely MRSA   -Wound dressing w/ mupirocin daily  -f/u MRSA swab  -f/u lactate   -clear for DC from podiatry standpoint w/ Augmentin, but will extend IV therapy while admitted inpatient  -Follow up with Dr Gibson within 1 week of discharge  -f/u w/ outpatient vascular surgeon for PAD

## 2025-01-18 NOTE — PHYSICAL THERAPY INITIAL EVALUATION ADULT - PERTINENT HX OF CURRENT PROBLEM, REHAB EVAL
69 M w/ ·PMH of CHF, HTN, HLD, type 2 DM, CAD s/p stents x10, presenting to ED c/o shortness of breath for a few weeks,  and left foot wound pt w/ wound on the L foot, pt states no fevers, no chills, increasing redness of the leg, pt w/ increasing sob w/ exertion. pt sent by pcp for possible infection pt not on antibiotics. XR Lt foot/ankle (-)> CXR (-).

## 2025-01-18 NOTE — H&P ADULT - NSHPREVIEWOFSYSTEMS_GEN_ALL_CORE
Constitutional: No fevers, chills, weight loss or fatigue   Skin: LLE wound on plantar surface. RLE erythema on dorsum of foot.  Eyes: No discharge	  ENMT: No sore throat, oral thrush, ulcers or exudate  Respiratory: cough productive of white sputum, dyspnea on exertion   Cardiovascular:  No chest pain, palpitations, edema of LE   Gastrointestinal: No pain, nausea, vomiting, diarrhea or constipation	  Genitourinary: No dysuria, discharge or flank pain  MSK: No arthralgias or back pain   Neurological: No HA, no weakness, no seizures, no AMS

## 2025-01-18 NOTE — PROGRESS NOTE ADULT - SUBJECTIVE AND OBJECTIVE BOX
PROGRESS NOTE:     Patient is a 69y old  Male who presents with a chief complaint of     INTERVAL EVENTS: No acute overnight events.     SUBJECTIVE: Patient seen and examined at bedside. This morning, the patient is comfortable and doing well. No acute complaints.    MEDICATIONS  (STANDING):  clopidogrel Tablet 75 milliGRAM(s) Oral daily  dextrose 5%. 1000 milliLiter(s) (50 mL/Hr) IV Continuous <Continuous>  dextrose 5%. 1000 milliLiter(s) (100 mL/Hr) IV Continuous <Continuous>  dextrose 50% Injectable 25 Gram(s) IV Push once  dextrose 50% Injectable 12.5 Gram(s) IV Push once  dextrose 50% Injectable 25 Gram(s) IV Push once  dextrose Oral Gel 15 Gram(s) Oral once  furosemide   Injectable 40 milliGRAM(s) IV Push every 12 hours  glucagon  Injectable 1 milliGRAM(s) IntraMuscular once  insulin glargine Injectable (LANTUS) 10 Unit(s) SubCutaneous at bedtime  insulin lispro (ADMELOG) corrective regimen sliding scale   SubCutaneous three times a day before meals  insulin lispro (ADMELOG) corrective regimen sliding scale   SubCutaneous at bedtime  insulin lispro Injectable (ADMELOG) 11 Unit(s) SubCutaneous before breakfast  insulin lispro Injectable (ADMELOG) 11 Unit(s) SubCutaneous before lunch  insulin lispro Injectable (ADMELOG) 11 Unit(s) SubCutaneous before dinner  isosorbide   dinitrate Tablet (ISORDIL) 10 milliGRAM(s) Oral three times a day  metoprolol succinate ER 50 milliGRAM(s) Oral daily  mupirocin 2% Ointment 1 Application(s) Topical <User Schedule>  piperacillin/tazobactam IVPB. 3.375 Gram(s) IV Intermittent once  piperacillin/tazobactam IVPB.- 3.375 Gram(s) IV Intermittent once  piperacillin/tazobactam IVPB.- 3.375 Gram(s) IV Intermittent once  piperacillin/tazobactam IVPB.. 3.375 Gram(s) IV Intermittent every 8 hours  rivaroxaban 15 milliGRAM(s) Oral with dinner  sodium bicarbonate 650 milliGRAM(s) Oral three times a day  spironolactone 50 milliGRAM(s) Oral daily    MEDICATIONS  (PRN):  acetaminophen     Tablet .. 650 milliGRAM(s) Oral every 6 hours PRN Temp greater or equal to 38C (100.4F), Mild Pain (1 - 3)      CAPILLARY BLOOD GLUCOSE        I&O's Summary    17 Jan 2025 07:01  -  18 Jan 2025 07:00  --------------------------------------------------------  IN: 0 mL / OUT: 850 mL / NET: -850 mL        PHYSICAL EXAM:  Vital Signs Last 24 Hrs  T(C): 36.6 (18 Jan 2025 04:19), Max: 36.6 (17 Jan 2025 18:40)  T(F): 97.8 (18 Jan 2025 04:19), Max: 97.8 (17 Jan 2025 18:40)  HR: 89 (18 Jan 2025 04:19) (78 - 89)  BP: 137/80 (18 Jan 2025 04:19) (135/83 - 158/102)  BP(mean): --  RR: 18 (18 Jan 2025 04:19) (18 - 20)  SpO2: 96% (18 Jan 2025 04:19) (93% - 100%)    Parameters below as of 18 Jan 2025 04:19  Patient On (Oxygen Delivery Method): room air        GENERAL: NAD, lying in bed comfortably  HEAD: Atraumatic, normocephalic  EYES: EOMI, PERRLA, conjunctiva and sclera clear  ENT: Moist mucous membranes  NECK: Supple, no JVD  HEART: S1, S2, Regular rate and rhythm, no murmurs, rubs, or gallops  LUNGS: Unlabored respirations, clear to auscultation bilaterally, no crackles, wheezing, or rhonchi  ABDOMEN: Soft, nontender, nondistended, +BS  EXTREMITIES: 2+ peripheral pulses bilaterally. No clubbing, cyanosis, or edema  NERVOUS SYSTEM:  A&Ox3, no focal deficits   SKIN: No rashes or lesions    LABS:                        16.0   10.28 )-----------( 309      ( 17 Jan 2025 19:13 )             45.8     01-17    134[L]  |  100  |  46[H]  ----------------------------<  151[H]  4.4   |  16[L]  |  1.75[H]    Ca    9.1      17 Jan 2025 19:13    TPro  6.4  /  Alb  4.0  /  TBili  1.5[H]  /  DBili  x   /  AST  64[H]  /  ALT  51[H]  /  AlkPhos  214[H]  01-17    PT/INR - ( 17 Jan 2025 19:13 )   PT: 23.9 sec;   INR: 2.09 ratio         PTT - ( 17 Jan 2025 19:13 )  PTT:41.0 sec      Urinalysis Basic - ( 17 Jan 2025 19:13 )    Color: x / Appearance: x / SG: x / pH: x  Gluc: 151 mg/dL / Ketone: x  / Bili: x / Urobili: x   Blood: x / Protein: x / Nitrite: x   Leuk Esterase: x / RBC: x / WBC x   Sq Epi: x / Non Sq Epi: x / Bacteria: x          RADIOLOGY & ADDITIONAL TESTS:  Results Reviewed:   Imaging Personally Reviewed:  Electrocardiogram Personally Reviewed:  Tele:

## 2025-01-18 NOTE — CONSULT NOTE ADULT - SUBJECTIVE AND OBJECTIVE BOX
CHIEF COMPLAINT: Chest Pain    HPI:  68 y/o male with hx of CAD s/p 10 stents, HFrEF (EF~50% as of 23), A-fib, HTN, HLD, T2DM on insulin, CKD3 coming in to ED w/ SOB for past few weeks and L foot wound. The patient had a scratch on the sole of his left foot 1 week ago and which opened up. He went to our office and was sent into the ED. He denies any fevers or chills but reports increasing redness and warmth of his leg.   He has also had increasing dyspnea on mild exertion over the past couple weeks and gets winded even with going up a flight of stairs. At the ED the patient's VSS on RA. He was admitted for further workup of dyspnea on exertion and management of his left foot wound.    (2025 05:11)  Feeling better from a dyspnea and leg standpoint     PAST MEDICAL & SURGICAL HISTORY:  Former smoker      CAD in native artery      Type 2 diabetes mellitus      Hyperlipidemia, unspecified hyperlipidemia type      Essential hypertension, hypertension with unspecified goal      Afib      Hematoma of leg      Stented coronary artery      CHF (congestive heart failure)      s/p Carotid Endarterectomy  left          Allergies    atorvastatin (Muscle Pain (Severe))    Intolerances        SOCIAL HISTORY    Smoking Hx:  ETOH Hx:  Marital Status:  Occupational Hx:    FAMILY HISTORY:  Family history of heart disease  father  age 71    FH: atrial fibrillation  sister        MEDICATIONS:  acetaminophen     Tablet .. 650 milliGRAM(s) Oral every 6 hours PRN  clopidogrel Tablet 75 milliGRAM(s) Oral daily  dextrose 5%. 1000 milliLiter(s) IV Continuous <Continuous>  dextrose 5%. 1000 milliLiter(s) IV Continuous <Continuous>  dextrose 50% Injectable 25 Gram(s) IV Push once  dextrose 50% Injectable 12.5 Gram(s) IV Push once  dextrose 50% Injectable 25 Gram(s) IV Push once  dextrose Oral Gel 15 Gram(s) Oral once  furosemide   Injectable 40 milliGRAM(s) IV Push every 12 hours  glucagon  Injectable 1 milliGRAM(s) IntraMuscular once  insulin glargine Injectable (LANTUS) 10 Unit(s) SubCutaneous at bedtime  insulin lispro (ADMELOG) corrective regimen sliding scale   SubCutaneous three times a day before meals  insulin lispro (ADMELOG) corrective regimen sliding scale   SubCutaneous at bedtime  insulin lispro Injectable (ADMELOG) 11 Unit(s) SubCutaneous before breakfast  insulin lispro Injectable (ADMELOG) 11 Unit(s) SubCutaneous before lunch  insulin lispro Injectable (ADMELOG) 11 Unit(s) SubCutaneous before dinner  isosorbide   dinitrate Tablet (ISORDIL) 10 milliGRAM(s) Oral three times a day  metoprolol succinate ER 50 milliGRAM(s) Oral daily  mupirocin 2% Ointment 1 Application(s) Topical <User Schedule>  piperacillin/tazobactam IVPB.- 3.375 Gram(s) IV Intermittent once  piperacillin/tazobactam IVPB.- 3.375 Gram(s) IV Intermittent once  piperacillin/tazobactam IVPB.. 3.375 Gram(s) IV Intermittent every 8 hours  rivaroxaban 15 milliGRAM(s) Oral with dinner  sodium bicarbonate 650 milliGRAM(s) Oral three times a day  spironolactone 50 milliGRAM(s) Oral daily      REVIEW OF SYSTEMS:    CONSTITUTIONAL: No weakness, fevers or chills  EYES/ENT: No visual changes;  No vertigo or throat pain   NECK: No pain or stiffness  RESPIRATORY: No cough, wheezing, hemoptysis; No shortness of breath  CARDIOVASCULAR: No chest pain or palpitations  GASTROINTESTINAL: No abdominal or epigastric pain. No nausea, vomiting, or hematemesis; No diarrhea or constipation. No melena or hematochezia.  GENITOURINARY: No dysuria, frequency or hematuria  NEUROLOGICAL: No numbness or weakness  SKIN: No itching, burning, rashes, or lesions   All other review of systems is negative unless indicated above    Vital Signs Last 24 Hrs  T(C): 37.2 (2025 10:59), Max: 37.2 (2025 10:59)  T(F): 98.9 (2025 10:59), Max: 98.9 (2025 10:59)  HR: 83 (2025 10:59) (78 - 89)  BP: 131/82 (2025 10:59) (131/82 - 158/102)  BP(mean): --  RR: 18 (2025 10:59) (18 - 20)  SpO2: 97% (2025 10:59) (93% - 100%)    Parameters below as of 2025 10:59  Patient On (Oxygen Delivery Method): room air        I&O's Summary    2025 07:01  -  2025 07:00  --------------------------------------------------------  IN: 0 mL / OUT: 850 mL / NET: -850 mL    2025 07:01  -  2025 11:56  --------------------------------------------------------  IN: 240 mL / OUT: 400 mL / NET: -160 mL        PHYSICAL EXAM:    Constitutional: NAD, awake and alert, well-developed  HEENT: PERR, EOMI  Neck: soft and supple, No LAD, No JVD  Respiratory: Breath sounds are clear bilaterally, No wheezing, rales or rhonchi  Cardiovascular: Regular rate and rhythm, normal S1 and S2,  no murmurs, gallops or rubs  Gastrointestinal: Bowel Sounds present, soft, nontender.   Extremities: No peripheral edema. No clubbing or cyanosis.  Vascular: 2+ peripheral pulses  Neurological: A/O x 3, no focal deficits  Musculoskeletal: no calf tenderness.  Skin: No rashes.      LABS: All Labs Reviewed:                        15.2   11.40 )-----------( 271      ( 2025 08:54 )             46.7                         16.0   10.28 )-----------( 309      ( 2025 19:13 )             45.8     2025 08:54    142    |  104    |  40     ----------------------------<  79     4.9     |  24     |  1.98   2025 19:13    134    |  100    |  46     ----------------------------<  151    4.4     |  16     |  1.75     Ca    9.9        2025 08:54  Ca    9.1        2025 19:13  Phos  3.9       2025 08:54  Mg     2.3       2025 08:54    TPro  6.4    /  Alb  3.9    /  TBili  2.3    /  DBili  x      /  AST  53     /  ALT  47     /  AlkPhos  185    2025 08:54  TPro  6.4    /  Alb  4.0    /  TBili  1.5    /  DBili  x      /  AST  64     /  ALT  51     /  AlkPhos  214    2025 19:13    PT/INR - ( 2025 08:54 )   PT: 19.0 sec;   INR: 1.68 ratio         PTT - ( 2025 08:54 )  PTT:39.1 sec  Blood Culture:

## 2025-01-18 NOTE — H&P ADULT - PROBLEM SELECTOR PLAN 2
1 week hx of L foot wound, seen by Podiatry in ED   L foot wound to dermis w/ cellulitis  XR foot ankle without signs of OM or gas, ESR CRP WBC WNL and afebrile   Lactate elevated to 2.8  s/p Zosyn and Vancomycin in ED     Plan:   C/w Zosyn 1/17-   -Wound dressing w/ mupirocin daily  -clear for DC from podiatry standpoint w/ Augmentin, but will extend IV therapy while admitted inpatient  Follow up with Dr Gibson within 1 week of discharge 1 week hx of L foot wound, seen by Podiatry in ED   L foot wound to dermis w/ cellulitis  XR foot ankle without signs of OM or gas, ESR CRP WBC WNL and afebrile   Lactate elevated to 2.8   s/p Zosyn and Vancomycin in ED     Plan:   C/w Zosyn 1/17-   DC Vancomycin, as wound not purulent and less likely MRSA   -Wound dressing w/ mupirocin daily  -f/u MRSA swab  -f/u lactate   -clear for DC from podiatry standpoint w/ Augmentin, but will extend IV therapy while admitted inpatient  Follow up with Dr Gibson within 1 week of discharge 1 week hx of L foot wound, seen by Podiatry in ED   L foot wound to dermis w/ cellulitis  Hx of significant PAD   XR foot ankle without signs of OM or gas, ESR CRP WBC WNL and afebrile   Lactate elevated to 2.8   s/p Zosyn and Vancomycin in ED     Plan:   C/w Zosyn 1/17-   DC Vancomycin, as wound not purulent and less likely MRSA   -Wound dressing w/ mupirocin daily  -f/u MRSA swab  -f/u lactate   -clear for DC from podiatry standpoint w/ Augmentin, but will extend IV therapy while admitted inpatient  -Follow up with Dr Gibson within 1 week of discharge  -f/u w/ outpatient vascular surgeon for PAD

## 2025-01-18 NOTE — DISCHARGE NOTE PROVIDER - NSDCCPTREATMENT_GEN_ALL_CORE_FT
PRINCIPAL PROCEDURE  Procedure: Complete transthoracic echocardiography (TTE)  Findings and Treatment:       SECONDARY PROCEDURE  Procedure: CXR, single AP view  Findings and Treatment: FINDINGS:  The heart size is enlarged.  The lungs are clear.  No pleural effusion.  No pneumothorax.  No acute osseous abnormalities.  IMPRESSION:  Clear lungs.       PRINCIPAL PROCEDURE  Procedure: Complete transthoracic echocardiography (TTE)  Findings and Treatment: CONCLUSIONS:      1. Left ventricular cavity is small. Left ventricular wall thickness is normal. Left ventricular systolic function is mildly to moderately decreased with an ejection fraction visually estimated at 40 to 45 %. Regional wall motion abnormalities present.   2. Multiple segmental abnormalities exist. See findings.   3. Unable to assess left ventricular diastolic function due to insufficient data.   4. Mild to moderate mitral regurgitation at a blood pressure of 150/85 mmHg.   5. Mild aortic regurgitation.   6. Normal right ventricular cavity size, with normal wall thickness, and borderline reduced right ventricular systolic function.   7. Estimated pulmonary artery systolic pressure is 49 mmHg, consistent with mild pulmonary hypertension.   8. No pericardial effusion seen.   9. Compared to the transthoracic echocardiogram performed on 6/24/2024, there have been no significant interval changes.        SECONDARY PROCEDURE  Procedure: CXR, single AP view  Findings and Treatment: FINDINGS:  The heart size is enlarged.  The lungs are clear.  No pleural effusion.  No pneumothorax.  No acute osseous abnormalities.  IMPRESSION:  Clear lungs.

## 2025-01-18 NOTE — DISCHARGE NOTE PROVIDER - NSDCCPCAREPLAN_GEN_ALL_CORE_FT
PRINCIPAL DISCHARGE DIAGNOSIS  Diagnosis: CHF exacerbation  Assessment and Plan of Treatment: You were admitted hospital for chronic heart failure exacerbtation. You went for an echocardiogram which shows*****  Please follow up with cardiology out patient for      SECONDARY DISCHARGE DIAGNOSES  Diagnosis: Cellulitis  Assessment and Plan of Treatment:      PRINCIPAL DISCHARGE DIAGNOSIS  Diagnosis: CHF exacerbation  Assessment and Plan of Treatment: You were admitted hospital for chronic heart failure exacerbtation. You went for an echocardiogram which shows*****  Please follow up with cardiology out patient for      SECONDARY DISCHARGE DIAGNOSES  Diagnosis: Cellulitis  Assessment and Plan of Treatment: Podiatry - Dr Gibson within 1 week of discharge ([phone])     PRINCIPAL DISCHARGE DIAGNOSIS  Diagnosis: CHF exacerbation  Assessment and Plan of Treatment: You were admitted in the hospital for chronic heart failure exacerbation.  You were given IV lasix in the hospital and now switched to oral lasix.  Follow up with cardiology.      SECONDARY DISCHARGE DIAGNOSES  Diagnosis: Cellulitis  Assessment and Plan of Treatment: You were given IV antibiotics in the hospital.  Continue with oral Augumentin for two more days.  Follow up with podiatry and vascular.   MRSA PCR positive: continue with bactroban in both nares for a total of five days.     PRINCIPAL DISCHARGE DIAGNOSIS  Diagnosis: CHF exacerbation  Assessment and Plan of Treatment: You were admitted in the hospital for chronic heart failure exacerbation.  You were given IV lasix in the hospital and now switched to oral lasix.  Follow up with cardiology.  Weigh yourself daily.  If you gain 3lbs in 3 days, or 5lbs in a week call your Health Care Provider.  Do not eat or drink foods containing more than 2000mg of salt (sodium) in your diet every day.  Call your Health Care Provider if you have any swelling or increased swelling in your feet, ankles, and/or stomach.  Take all of your medication as directed.  If you become dizzy call your Health Care Provider.      SECONDARY DISCHARGE DIAGNOSES  Diagnosis: Cellulitis  Assessment and Plan of Treatment: completed course of antibitoics inpatient. Follow up with podiatry and vascular.   you will have visiting nurses come evaluate you at home, continue with mupirocin and gauze.       Diagnosis: Arterial insufficiency with ischemic ulcer  Assessment and Plan of Treatment: You had blockages of your lower rextremity arteries requiring a lower extremity angiogram and stent placement. Seek medical attention if you develop pain, fever, hematoma, etc.   Follow up with vascular outpatient.

## 2025-01-18 NOTE — PROGRESS NOTE ADULT - SUBJECTIVE AND OBJECTIVE BOX
PROGRESS NOTE:     Patient is a 69y old  Male who presents with a chief complaint of     INTERVAL EVENTS: No acute overnight events.     SUBJECTIVE: Patient seen and examined at bedside. This morning, the patient is comfortable and doing well. No acute complaints.    MEDICATIONS  (STANDING):  clopidogrel Tablet 75 milliGRAM(s) Oral daily  dextrose 5%. 1000 milliLiter(s) (50 mL/Hr) IV Continuous <Continuous>  dextrose 5%. 1000 milliLiter(s) (100 mL/Hr) IV Continuous <Continuous>  dextrose 50% Injectable 25 Gram(s) IV Push once  dextrose 50% Injectable 12.5 Gram(s) IV Push once  dextrose 50% Injectable 25 Gram(s) IV Push once  dextrose Oral Gel 15 Gram(s) Oral once  furosemide   Injectable 40 milliGRAM(s) IV Push every 12 hours  glucagon  Injectable 1 milliGRAM(s) IntraMuscular once  insulin glargine Injectable (LANTUS) 10 Unit(s) SubCutaneous at bedtime  insulin lispro (ADMELOG) corrective regimen sliding scale   SubCutaneous three times a day before meals  insulin lispro (ADMELOG) corrective regimen sliding scale   SubCutaneous at bedtime  insulin lispro Injectable (ADMELOG) 11 Unit(s) SubCutaneous before breakfast  insulin lispro Injectable (ADMELOG) 11 Unit(s) SubCutaneous before lunch  insulin lispro Injectable (ADMELOG) 11 Unit(s) SubCutaneous before dinner  isosorbide   dinitrate Tablet (ISORDIL) 10 milliGRAM(s) Oral three times a day  metoprolol succinate ER 50 milliGRAM(s) Oral daily  mupirocin 2% Ointment 1 Application(s) Topical <User Schedule>  piperacillin/tazobactam IVPB.- 3.375 Gram(s) IV Intermittent once  piperacillin/tazobactam IVPB.- 3.375 Gram(s) IV Intermittent once  piperacillin/tazobactam IVPB.. 3.375 Gram(s) IV Intermittent every 8 hours  rivaroxaban 15 milliGRAM(s) Oral with dinner  sodium bicarbonate 650 milliGRAM(s) Oral three times a day  spironolactone 50 milliGRAM(s) Oral daily    MEDICATIONS  (PRN):  acetaminophen     Tablet .. 650 milliGRAM(s) Oral every 6 hours PRN Temp greater or equal to 38C (100.4F), Mild Pain (1 - 3)      CAPILLARY BLOOD GLUCOSE        I&O's Summary    17 Jan 2025 07:01  -  18 Jan 2025 07:00  --------------------------------------------------------  IN: 0 mL / OUT: 850 mL / NET: -850 mL        PHYSICAL EXAM:  Vital Signs Last 24 Hrs  T(C): 36.6 (18 Jan 2025 04:19), Max: 36.6 (17 Jan 2025 18:40)  T(F): 97.8 (18 Jan 2025 04:19), Max: 97.8 (17 Jan 2025 18:40)  HR: 88 (18 Jan 2025 07:00) (78 - 89)  BP: 134/74 (18 Jan 2025 07:00) (134/74 - 158/102)  BP(mean): --  RR: 18 (18 Jan 2025 04:19) (18 - 20)  SpO2: 96% (18 Jan 2025 04:19) (93% - 100%)    Parameters below as of 18 Jan 2025 04:19  Patient On (Oxygen Delivery Method): room air        GENERAL: NAD, lying in bed comfortably  HEAD: Atraumatic, normocephalic  EYES: EOMI, PERRLA, conjunctiva and sclera clear  ENT: Moist mucous membranes  NECK: Supple, no JVD  HEART: S1, S2, Regular rate and rhythm, no murmurs, rubs, or gallops  LUNGS: Unlabored respirations, clear to auscultation bilaterally, no crackles, wheezing, or rhonchi  ABDOMEN: Soft, nontender, nondistended, +BS  EXTREMITIES: 2+ peripheral pulses bilaterally. No clubbing, cyanosis, or edema  NERVOUS SYSTEM:  A&Ox3, no focal deficits   SKIN: No rashes or lesions    LABS:                        16.0   10.28 )-----------( 309      ( 17 Jan 2025 19:13 )             45.8     01-17    134[L]  |  100  |  46[H]  ----------------------------<  151[H]  4.4   |  16[L]  |  1.75[H]    Ca    9.1      17 Jan 2025 19:13    TPro  6.4  /  Alb  4.0  /  TBili  1.5[H]  /  DBili  x   /  AST  64[H]  /  ALT  51[H]  /  AlkPhos  214[H]  01-17    PT/INR - ( 17 Jan 2025 19:13 )   PT: 23.9 sec;   INR: 2.09 ratio         PTT - ( 17 Jan 2025 19:13 )  PTT:41.0 sec      Urinalysis Basic - ( 17 Jan 2025 19:13 )    Color: x / Appearance: x / SG: x / pH: x  Gluc: 151 mg/dL / Ketone: x  / Bili: x / Urobili: x   Blood: x / Protein: x / Nitrite: x   Leuk Esterase: x / RBC: x / WBC x   Sq Epi: x / Non Sq Epi: x / Bacteria: x          RADIOLOGY & ADDITIONAL TESTS:  Results Reviewed:   Imaging Personally Reviewed:  Electrocardiogram Personally Reviewed:  Tele:

## 2025-01-18 NOTE — PROGRESS NOTE ADULT - ASSESSMENT
70 y/o male with hx of CAD s/p multiple  stents, HFrEF (EF~50% as of 11/26/23), A-fib, HTN, HLD, T2DM, CKD3, former smoke a/w BLe pain found to have hypokalemia and US : There is bilateral lower extremity arterial vascular disease, more severe   on the right.    The right popliteal artery is occluded.  The right posterior tibial peroneal trunk is occluded.  There is a postobstructive pattern of diminished antegrade flow through   the right trifurcation and dorsal pedis arteries.    There is a severe stenosis of the left posterior tibial artery in the   proximal calf.  There is a severe stenosis of the left peroneal artery in the proximal   calf.  pt denies any diarreah   no cp/sob  no chjnage in dosing of his diuretics     hypokalemia/magenmia : cont replacement per renal   monitor K and mag : improved     off metolazone   moniotr as op     BARRY : restart diuretics   monitor     Severe PAD :  d/w vasc   fu wiht DR. Calle as op         afib : restarted  DOAC       CAD: fu with cardiology     DM: uncontrolled   monitor FS   hypoglycemia /hyper note d  adjust insulin per Endo

## 2025-01-18 NOTE — PROGRESS NOTE ADULT - ASSESSMENT
68 y/o male with hx of CAD s/p multiple  stents, HFrEF (EF~50% as of 11/26/23), A-fib, HTN, HLD, T2DM, CKD3, former smoke a/w BLe pain found to have hypokalemia and US : There is bilateral lower extremity arterial vascular disease, more severe   on the right.    The right popliteal artery is occluded.  The right posterior tibial peroneal trunk is occluded.  There is a postobstructive pattern of diminished antegrade flow through   the right trifurcation and dorsal pedis arteries.    There is a severe stenosis of the left posterior tibial artery in the   proximal calf.  There is a severe stenosis of the left peroneal artery in the proximal   calf.  pt denies any diarreah   no cp/sob  no chjnage in dosing of his diuretics     hypokalemia/magenmia : cont replacement per renal   monitor K and mag : improved     off metolazone   moniotr as op     BARRY : restart diuretics   monitor     Severe PAD :  d/w vasc   fu wiht DR. Calle as op         afib : restarted  DOAC       CAD: fu with cardiology     DM: uncontrolled   monitor FS   hypoglycemia /hyper note d  adjust insulin per Endo

## 2025-01-18 NOTE — PHYSICAL THERAPY INITIAL EVALUATION ADULT - ADDITIONAL COMMENTS
as per pt, resides in a PH with spouse, 7 stairs to enter, one floor set up, PTA, pt amb (I), (I) with ADls.

## 2025-01-18 NOTE — H&P ADULT - NSHPPHYSICALEXAM_GEN_ALL_CORE
Vital Signs Last 24 Hrs  T(C): 36.6 (18 Jan 2025 04:19), Max: 36.6 (17 Jan 2025 18:40)  T(F): 97.8 (18 Jan 2025 04:19), Max: 97.8 (17 Jan 2025 18:40)  HR: 89 (18 Jan 2025 04:19) (78 - 89)  BP: 137/80 (18 Jan 2025 04:19) (135/83 - 158/102)  BP(mean): --  RR: 18 (18 Jan 2025 04:19) (18 - 20)  SpO2: 96% (18 Jan 2025 04:19) (93% - 100%)    Parameters below as of 18 Jan 2025 04:19  Patient On (Oxygen Delivery Method): room air        General: Patient in NAD  HEENT: NCAT, EOMI, no oral lesions  CV: S1+S2, no m/r/g appreciated   Lungs: No respiratory distress on RA. CTAB  Abd: Soft, nontender, no guarding, no rebound tenderness, + bowel sounds   : No suprapubic tenderness  Neuro: Alert and oriented to time, place and person. No focal deficits noted.   Ext: RLE cool to touch, tibial pulses faint, LLE foot warmer to touch compared to R. Dressing intact w.o bleeding or drainage, TRISTIAN 2+ Pitting edema to thighs

## 2025-01-19 LAB
ALBUMIN SERPL ELPH-MCNC: 3.7 G/DL — SIGNIFICANT CHANGE UP (ref 3.3–5)
ALP SERPL-CCNC: 193 U/L — HIGH (ref 40–120)
ALT FLD-CCNC: 39 U/L — SIGNIFICANT CHANGE UP (ref 10–45)
ANION GAP SERPL CALC-SCNC: 17 MMOL/L — SIGNIFICANT CHANGE UP (ref 5–17)
APPEARANCE UR: CLEAR — SIGNIFICANT CHANGE UP
AST SERPL-CCNC: 38 U/L — SIGNIFICANT CHANGE UP (ref 10–40)
BASOPHILS # BLD AUTO: 0.04 K/UL — SIGNIFICANT CHANGE UP (ref 0–0.2)
BASOPHILS NFR BLD AUTO: 0.5 % — SIGNIFICANT CHANGE UP (ref 0–2)
BILIRUB SERPL-MCNC: 1.2 MG/DL — SIGNIFICANT CHANGE UP (ref 0.2–1.2)
BILIRUB UR-MCNC: NEGATIVE — SIGNIFICANT CHANGE UP
BUN SERPL-MCNC: 54 MG/DL — HIGH (ref 7–23)
CALCIUM SERPL-MCNC: 9.1 MG/DL — SIGNIFICANT CHANGE UP (ref 8.4–10.5)
CHLORIDE SERPL-SCNC: 100 MMOL/L — SIGNIFICANT CHANGE UP (ref 96–108)
CO2 SERPL-SCNC: 22 MMOL/L — SIGNIFICANT CHANGE UP (ref 22–31)
COLOR SPEC: YELLOW — SIGNIFICANT CHANGE UP
CREAT ?TM UR-MCNC: 46 MG/DL — SIGNIFICANT CHANGE UP
CREAT SERPL-MCNC: 2.39 MG/DL — HIGH (ref 0.5–1.3)
DIFF PNL FLD: NEGATIVE — SIGNIFICANT CHANGE UP
EGFR: 29 ML/MIN/1.73M2 — LOW
EOSINOPHIL # BLD AUTO: 0.17 K/UL — SIGNIFICANT CHANGE UP (ref 0–0.5)
EOSINOPHIL NFR BLD AUTO: 1.9 % — SIGNIFICANT CHANGE UP (ref 0–6)
GLUCOSE BLDC GLUCOMTR-MCNC: 121 MG/DL — HIGH (ref 70–99)
GLUCOSE BLDC GLUCOMTR-MCNC: 132 MG/DL — HIGH (ref 70–99)
GLUCOSE BLDC GLUCOMTR-MCNC: 147 MG/DL — HIGH (ref 70–99)
GLUCOSE BLDC GLUCOMTR-MCNC: 152 MG/DL — HIGH (ref 70–99)
GLUCOSE SERPL-MCNC: 164 MG/DL — HIGH (ref 70–99)
GLUCOSE UR QL: >=1000 MG/DL
HCT VFR BLD CALC: 44.1 % — SIGNIFICANT CHANGE UP (ref 39–50)
HGB BLD-MCNC: 14.7 G/DL — SIGNIFICANT CHANGE UP (ref 13–17)
IMM GRANULOCYTES NFR BLD AUTO: 0.5 % — SIGNIFICANT CHANGE UP (ref 0–0.9)
KETONES UR-MCNC: NEGATIVE MG/DL — SIGNIFICANT CHANGE UP
LEUKOCYTE ESTERASE UR-ACNC: NEGATIVE — SIGNIFICANT CHANGE UP
LYMPHOCYTES # BLD AUTO: 1.17 K/UL — SIGNIFICANT CHANGE UP (ref 1–3.3)
LYMPHOCYTES # BLD AUTO: 13.4 % — SIGNIFICANT CHANGE UP (ref 13–44)
MAGNESIUM SERPL-MCNC: 2.3 MG/DL — SIGNIFICANT CHANGE UP (ref 1.6–2.6)
MCHC RBC-ENTMCNC: 31.2 PG — SIGNIFICANT CHANGE UP (ref 27–34)
MCHC RBC-ENTMCNC: 33.3 G/DL — SIGNIFICANT CHANGE UP (ref 32–36)
MCV RBC AUTO: 93.6 FL — SIGNIFICANT CHANGE UP (ref 80–100)
MONOCYTES # BLD AUTO: 0.98 K/UL — HIGH (ref 0–0.9)
MONOCYTES NFR BLD AUTO: 11.2 % — SIGNIFICANT CHANGE UP (ref 2–14)
NEUTROPHILS # BLD AUTO: 6.34 K/UL — SIGNIFICANT CHANGE UP (ref 1.8–7.4)
NEUTROPHILS NFR BLD AUTO: 72.5 % — SIGNIFICANT CHANGE UP (ref 43–77)
NITRITE UR-MCNC: NEGATIVE — SIGNIFICANT CHANGE UP
NRBC # BLD: 0 /100 WBCS — SIGNIFICANT CHANGE UP (ref 0–0)
NRBC BLD-RTO: 0 /100 WBCS — SIGNIFICANT CHANGE UP (ref 0–0)
PH UR: 6.5 — SIGNIFICANT CHANGE UP (ref 5–8)
PHOSPHATE SERPL-MCNC: 4.5 MG/DL — SIGNIFICANT CHANGE UP (ref 2.5–4.5)
PLATELET # BLD AUTO: 227 K/UL — SIGNIFICANT CHANGE UP (ref 150–400)
POTASSIUM SERPL-MCNC: 3.9 MMOL/L — SIGNIFICANT CHANGE UP (ref 3.5–5.3)
POTASSIUM SERPL-SCNC: 3.9 MMOL/L — SIGNIFICANT CHANGE UP (ref 3.5–5.3)
PROT ?TM UR-MCNC: 16 MG/DL — HIGH (ref 0–12)
PROT SERPL-MCNC: 6.2 G/DL — SIGNIFICANT CHANGE UP (ref 6–8.3)
PROT UR-MCNC: SIGNIFICANT CHANGE UP MG/DL
PROT/CREAT UR-RTO: 0.3 RATIO — HIGH (ref 0–0.2)
RBC # BLD: 4.71 M/UL — SIGNIFICANT CHANGE UP (ref 4.2–5.8)
RBC # FLD: 13.8 % — SIGNIFICANT CHANGE UP (ref 10.3–14.5)
SODIUM SERPL-SCNC: 139 MMOL/L — SIGNIFICANT CHANGE UP (ref 135–145)
SODIUM UR-SCNC: 67 MMOL/L — SIGNIFICANT CHANGE UP
SP GR SPEC: 1.02 — SIGNIFICANT CHANGE UP (ref 1–1.03)
URATE SERPL-MCNC: 5.9 MG/DL — SIGNIFICANT CHANGE UP (ref 3.4–8.8)
UROBILINOGEN FLD QL: 0.2 MG/DL — SIGNIFICANT CHANGE UP (ref 0.2–1)
WBC # BLD: 8.74 K/UL — SIGNIFICANT CHANGE UP (ref 3.8–10.5)
WBC # FLD AUTO: 8.74 K/UL — SIGNIFICANT CHANGE UP (ref 3.8–10.5)

## 2025-01-19 PROCEDURE — 93970 EXTREMITY STUDY: CPT | Mod: 26

## 2025-01-19 PROCEDURE — 76770 US EXAM ABDO BACK WALL COMP: CPT | Mod: 26

## 2025-01-19 PROCEDURE — 99232 SBSQ HOSP IP/OBS MODERATE 35: CPT | Mod: GC

## 2025-01-19 PROCEDURE — 93923 UPR/LXTR ART STDY 3+ LVLS: CPT | Mod: 26

## 2025-01-19 RX ORDER — BACTERIOSTATIC SODIUM CHLORIDE 0.9 %
250 VIAL (ML) INJECTION
Refills: 0 | Status: DISCONTINUED | OUTPATIENT
Start: 2025-01-19 | End: 2025-01-23

## 2025-01-19 RX ORDER — AMPICILLIN SODIUM AND SULBACTAM SODIUM 2; 1 G/1; G/1
3 INJECTION, POWDER, FOR SOLUTION INTRAMUSCULAR; INTRAVENOUS EVERY 12 HOURS
Refills: 0 | Status: DISCONTINUED | OUTPATIENT
Start: 2025-01-19 | End: 2025-01-22

## 2025-01-19 RX ORDER — MUPIROCIN 2 G/100G
1 CREAM TOPICAL
Refills: 0 | Status: DISCONTINUED | OUTPATIENT
Start: 2025-01-19 | End: 2025-01-23

## 2025-01-19 RX ORDER — PIPERACILLIN SODIUM AND TAZOBACTAM SODIUM 2; 250 G/50ML; MG/50ML
3.38 INJECTION, POWDER, FOR SOLUTION INTRAVENOUS EVERY 12 HOURS
Refills: 0 | Status: DISCONTINUED | OUTPATIENT
Start: 2025-01-19 | End: 2025-01-19

## 2025-01-19 RX ADMIN — PIPERACILLIN SODIUM AND TAZOBACTAM SODIUM 25 GRAM(S): 2; 250 INJECTION, POWDER, FOR SOLUTION INTRAVENOUS at 03:13

## 2025-01-19 RX ADMIN — SODIUM BICARBONATE 650 MILLIGRAM(S): 42 INJECTION, SOLUTION INTRAVENOUS at 22:00

## 2025-01-19 RX ADMIN — ISOSORBIDE DINITRATE 10 MILLIGRAM(S): 40 TABLET ORAL at 06:12

## 2025-01-19 RX ADMIN — Medication 50 MILLIGRAM(S): at 06:12

## 2025-01-19 RX ADMIN — Medication 11 UNIT(S): at 08:12

## 2025-01-19 RX ADMIN — RIVAROXABAN 15 MILLIGRAM(S): 20 TABLET, FILM COATED ORAL at 17:25

## 2025-01-19 RX ADMIN — ISOSORBIDE DINITRATE 10 MILLIGRAM(S): 40 TABLET ORAL at 12:22

## 2025-01-19 RX ADMIN — AMPICILLIN SODIUM AND SULBACTAM SODIUM 200 GRAM(S): 2; 1 INJECTION, POWDER, FOR SOLUTION INTRAMUSCULAR; INTRAVENOUS at 17:26

## 2025-01-19 RX ADMIN — INSULIN GLARGINE-YFGN 10 UNIT(S): 100 INJECTION, SOLUTION SUBCUTANEOUS at 21:59

## 2025-01-19 RX ADMIN — SODIUM BICARBONATE 650 MILLIGRAM(S): 42 INJECTION, SOLUTION INTRAVENOUS at 12:23

## 2025-01-19 RX ADMIN — Medication 11 UNIT(S): at 12:22

## 2025-01-19 RX ADMIN — Medication 75 MILLIGRAM(S): at 12:22

## 2025-01-19 RX ADMIN — ISOSORBIDE DINITRATE 10 MILLIGRAM(S): 40 TABLET ORAL at 17:25

## 2025-01-19 RX ADMIN — Medication 50 MILLIGRAM(S): at 05:28

## 2025-01-19 RX ADMIN — MUPIROCIN 1 APPLICATION(S): 2 CREAM TOPICAL at 17:27

## 2025-01-19 RX ADMIN — Medication 40 MILLIGRAM(S): at 05:29

## 2025-01-19 RX ADMIN — Medication 50 MILLILITER(S): at 14:17

## 2025-01-19 RX ADMIN — Medication 11 UNIT(S): at 17:27

## 2025-01-19 RX ADMIN — PIPERACILLIN SODIUM AND TAZOBACTAM SODIUM 25 GRAM(S): 2; 250 INJECTION, POWDER, FOR SOLUTION INTRAVENOUS at 12:20

## 2025-01-19 RX ADMIN — SODIUM BICARBONATE 650 MILLIGRAM(S): 42 INJECTION, SOLUTION INTRAVENOUS at 05:28

## 2025-01-19 RX ADMIN — Medication 1: at 08:13

## 2025-01-19 NOTE — PROGRESS NOTE ADULT - SUBJECTIVE AND OBJECTIVE BOX
SUBJECTIVE:  Feeling much better from breathing perspective. NO CP no SOB. No foot pain       MEDICATIONS:  furosemide   Injectable 40 milliGRAM(s) IV Push every 12 hours  isosorbide   dinitrate Tablet (ISORDIL) 10 milliGRAM(s) Oral three times a day  metoprolol succinate ER 50 milliGRAM(s) Oral daily  spironolactone 50 milliGRAM(s) Oral daily    piperacillin/tazobactam IVPB.. 3.375 Gram(s) IV Intermittent every 8 hours      acetaminophen     Tablet .. 650 milliGRAM(s) Oral every 6 hours PRN      dextrose 50% Injectable 25 Gram(s) IV Push once  dextrose 50% Injectable 12.5 Gram(s) IV Push once  dextrose 50% Injectable 25 Gram(s) IV Push once  dextrose Oral Gel 15 Gram(s) Oral once  glucagon  Injectable 1 milliGRAM(s) IntraMuscular once  insulin glargine Injectable (LANTUS) 10 Unit(s) SubCutaneous at bedtime  insulin lispro (ADMELOG) corrective regimen sliding scale   SubCutaneous three times a day before meals  insulin lispro (ADMELOG) corrective regimen sliding scale   SubCutaneous at bedtime  insulin lispro Injectable (ADMELOG) 11 Unit(s) SubCutaneous before breakfast  insulin lispro Injectable (ADMELOG) 11 Unit(s) SubCutaneous before lunch  insulin lispro Injectable (ADMELOG) 11 Unit(s) SubCutaneous before dinner    clopidogrel Tablet 75 milliGRAM(s) Oral daily  dextrose 5%. 1000 milliLiter(s) IV Continuous <Continuous>  dextrose 5%. 1000 milliLiter(s) IV Continuous <Continuous>  mupirocin 2% Ointment 1 Application(s) Topical <User Schedule>  rivaroxaban 15 milliGRAM(s) Oral with dinner  sodium bicarbonate 650 milliGRAM(s) Oral three times a day      REVIEW OF SYSTEMS:    CONSTITUTIONAL: No fever, weight loss, or fatigue  EYES: No eye pain, visual disturbances, or discharge  NECK: No pain or stiffness  RESPIRATORY: No cough, wheezing, chills or hemoptysis; No Shortness of Breath  CARDIOVASCULAR: No chest pain, palpitations, dizziness, or leg swelling  GASTROINTESTINAL: No abdominal or epigastric pain. No nausea, vomiting, or hematemesis; No diarrhea or constipation. No melena or hematochezia.  GENITOURINARY: No dysuria, frequency, hematuria, or incontinence  NEUROLOGICAL: No headaches, memory loss, loss of strength, numbness, or tremors  SKIN: No itching, burning, rashes, or lesions   LYMPH Nodes: No enlarged glands  MUSCULOSKELETAL: No joint pain or swelling; No muscle, back, or extremity pain  All other review of systems are negative.  	  [ ] Unable to obtain    PHYSICAL EXAM:  T(C): 36.9 (01-19-25 @ 04:02), Max: 37.2 (01-18-25 @ 10:59)  HR: 80 (01-19-25 @ 05:28) (69 - 85)  BP: 136/76 (01-19-25 @ 05:28) (131/69 - 136/76)  RR: 18 (01-19-25 @ 04:02) (18 - 18)  SpO2: 97% (01-19-25 @ 04:02) (97% - 97%)  Wt(kg): --  I&O's Summary    18 Jan 2025 07:01  -  19 Jan 2025 07:00  --------------------------------------------------------  IN: 600 mL / OUT: 3100 mL / NET: -2500 mL          PHYSICAL EXAM    Appearance: Normal	  HEENT:   Normal oral mucosa, PERRL, EOMI	  NECK: Soft and supple, No LAD, No JVD  Cardiovascular: Irregular Rate and Rhythm, Normal S1 S2, No murmurs, No clicks, gallops or rubs  Respiratory: Lungs clear to auscultation	  Gastrointestinal:  Soft, Non-tender, + BS	  Skin: No rashes, No ecchymoses, No cyanosis  Neurologic: Non-focal  Extremities: No clubbing, cyanosis nor edema dressing present on L foot    LABS:	 	                        14.7   8.74  )-----------( 227      ( 19 Jan 2025 06:10 )             44.1     01-19    139  |  100  |  54[H]  ----------------------------<  164[H]  3.9   |  22  |  2.39[H]    Ca    9.1      19 Jan 2025 06:11  Phos  4.5     01-19  Mg     2.3     01-19    TPro  6.2  /  Alb  3.7  /  TBili  1.2  /  DBili  x   /  AST  38  /  ALT  39  /  AlkPhos  193[H]  01-19

## 2025-01-19 NOTE — PROGRESS NOTE ADULT - PROBLEM SELECTOR PLAN 1
~2weeks hx of dyspnea on exertion limiting daily activities   Extensive cardiac hx including cad s/p 10 stents, HFrEF w/ LVEF 50% in 11/2023   ProBNP elevated [place] [address], appears volume overloaded on exam  Hx consistent w/ worsening CHF vs. cardiac ischemia rather than infectious etiology   Clear lungs on CXR   Negative Flu/Cov/RSV  s/p 80mg IVP in ED     Plan:   -f/u TTE   -Supplemental O2 as needed   -f/u full RVP BCx x2  -Lasix 40mg IV BID for diuresis   -Strict I&O w/ goal net negative 1-2L   -Daily standing weights  -Cardiology consult in AM given extensive cardiac hx and worsening HF

## 2025-01-19 NOTE — PROGRESS NOTE ADULT - ATTENDING COMMENTS
Patient is a 69M with h/o CAD s/p 10 stents, HFrEF (EF~50% as of 11/26/23), A-fib, HTN, HLD, T2DM on insulin, CKD3 here for evaluation and management of dyspnea on exertion and left foot wound, Afebrile, no chest pain, found to have elevated Trops, BNP. Card, podiatry evaluated    1. Acute on chronic systolic HF  2. BARRY on CKD 3  3. L foot infection  4. Elevated Trops with known h/o CAD, s/p PCI  5. Chronic AF on Eliquis  6. T2DM    - breathing improving, no chest pain, fever, VSS, no Tele events  - Reviewed labs, imaging- Scr 2.39 from 1.94, c/w Aldactone   - Cardiology f/u plans noted- held Lasix IV , c/w Metoprolol, ISDN, Aldactone, daily I/O  - Renal US  - Podiatry plans noted- on IV zosyn adj with Scr, NIKHIL ordered- if <1.0 will ask Vascular Sx  - Insulin SS  - PT     Time spent 45 min excluding house staff teaching Patient is a 69M with h/o CAD s/p 10 stents, HFrEF (EF~50% as of 11/26/23), A-fib, HTN, HLD, T2DM on insulin, CKD3 here for evaluation and management of dyspnea on exertion and left foot wound, Afebrile, no chest pain, found to have elevated Trops, BNP. Card, podiatry evaluated    1. Acute on chronic systolic HF  2. BARRY on CKD 3  3. L foot infection  4. Elevated Trops with known h/o CAD, s/p PCI  5. Chronic AF on Eliquis  6. T2DM    - breathing improving, no chest pain, fever, VSS, no Tele events  - Reviewed labs, imaging- Scr 2.39 from 1.94, c/w Aldactone   - Cardiology f/u plans noted- held Lasix IV , c/w Metoprolol, ISDN, Aldactone, daily I/O  - Elevated Trops are due to demand from infection and CHF i/s/o BARRY  - Renal US ordered, renal consult called  - Podiatry plans noted- on IV zosyn adj with Scr, NIKHIL ordered- if <1.0 will ask Vascular Sx  - Insulin SS  - PT     Time spent 45 min excluding house staff teaching

## 2025-01-19 NOTE — PROGRESS NOTE ADULT - SUBJECTIVE AND OBJECTIVE BOX
Patient is a 69y old  Male who presents with a chief complaint of left foot infection (19 Jan 2025 08:00)       INTERVAL HPI/OVERNIGHT EVENTS:  Patient seen and evaluated at bedside.  Pt is resting comfortable in NAD. Denies N/V/F/C.    Allergies    atorvastatin (Muscle Pain (Severe))    Intolerances        Vital Signs Last 24 Hrs  T(C): 37 (19 Jan 2025 11:21), Max: 37.1 (18 Jan 2025 21:06)  T(F): 98.6 (19 Jan 2025 11:21), Max: 98.7 (18 Jan 2025 21:06)  HR: 72 (19 Jan 2025 11:21) (69 - 85)  BP: 144/71 (19 Jan 2025 11:21) (131/69 - 144/71)  BP(mean): --  RR: 18 (19 Jan 2025 11:21) (18 - 18)  SpO2: 96% (19 Jan 2025 11:21) (96% - 97%)    Parameters below as of 19 Jan 2025 11:21  Patient On (Oxygen Delivery Method): room air        LABS:                        14.7   8.74  )-----------( 227      ( 19 Jan 2025 06:10 )             44.1     01-19    139  |  100  |  54[H]  ----------------------------<  164[H]  3.9   |  22  |  2.39[H]    Ca    9.1      19 Jan 2025 06:11  Phos  4.5     01-19  Mg     2.3     01-19    TPro  6.2  /  Alb  3.7  /  TBili  1.2  /  DBili  x   /  AST  38  /  ALT  39  /  AlkPhos  193[H]  01-19    PT/INR - ( 18 Jan 2025 08:54 )   PT: 19.0 sec;   INR: 1.68 ratio         PTT - ( 18 Jan 2025 08:54 )  PTT:39.1 sec  Urinalysis Basic - ( 19 Jan 2025 06:11 )    Color: x / Appearance: x / SG: x / pH: x  Gluc: 164 mg/dL / Ketone: x  / Bili: x / Urobili: x   Blood: x / Protein: x / Nitrite: x   Leuk Esterase: x / RBC: x / WBC x   Sq Epi: x / Non Sq Epi: x / Bacteria: x      CAPILLARY BLOOD GLUCOSE      POCT Blood Glucose.: 132 mg/dL (19 Jan 2025 11:51)  POCT Blood Glucose.: 152 mg/dL (19 Jan 2025 08:08)  POCT Blood Glucose.: 337 mg/dL (18 Jan 2025 21:18)  POCT Blood Glucose.: 84 mg/dL (18 Jan 2025 17:28)  POCT Blood Glucose.: 64 mg/dL (18 Jan 2025 16:53)      Lower Extremity Physical Exam:  Vascular: DP/PT 0/4, B/L, CFT <3 seconds B/L, Temperature gradient warm to cool, B/L.   Neuro: Epicritic sensation diminished to the level of digits, B/L.  Musculoskeletal/Ortho: unremarkable  Skin: Left foot dorsal midfoot wound to dermis with erythema extending from the base of digits 2-4 to the proximal midfoot, no drainage, no malodor. Mild improvement of erythema with elevation. Right foot no open wounds, no acute signs of infection.    RADIOLOGY & ADDITIONAL TESTS:

## 2025-01-19 NOTE — PROGRESS NOTE ADULT - ASSESSMENT
68 y/o male with hx of CAD s/p multiple  stents, HFrEF (EF~50% as of 11/26/23), A-fib, HTN, HLD, T2DM, CKD3, former smoke a/w BLe pain found to have hypokalemia and US : There is bilateral lower extremity arterial vascular disease, more severe on the right.  - SOB improved with diuretic  - abx for foot infection  - seen by Podiatry  - agree with close observation and inpt ABX  - hold diuretics given BARRY, (creat 1.98 --> 2.39)  potentially combination of diuretics and ABX Consider nephrology eval  - given improvement in breathing and new BARRY would hold off on cardiac CATH at present  - Troponins 376 and 310 likely demand ischemia coupled with renal insufficiency Review of past Troponins always high on admission Last 6/23/24 606- presentation not consistent with ACS   - Consider Vascular eval - known to Dr Calle   - d/w pt in detail

## 2025-01-19 NOTE — PROGRESS NOTE ADULT - ASSESSMENT
69M presents for left foot wound to dermis with cellulitis  - Pt seen and evaluated  - Afebrile, no leukocytosis   - Left foot dorsal midfoot wound to dermis with erythema extending from the base of digits 2-4 to the proximal midfoot, no drainage, no malodor. Mild improvement of erythema with elevation. Right foot no open wounds, no acute signs of infection.  - Left foot X-ray read: no OM, no gas  - No culture taken 2/2 no culturable material  - Recommend ID consult   - Recommend Vasc consult. Patient follows up with Dr. Calle   - Wound dressing daily changed with mupirocin, 4x4 gauze and bill  - Pt is stable for discharge from podiatry standpoint pending final ID recs/ final vasc recs   - Wound care instruction and follow up information in discharge note provider   - Discussed with attending

## 2025-01-19 NOTE — PROGRESS NOTE ADULT - SUBJECTIVE AND OBJECTIVE BOX
Alex Mcneill  PGY-2 Resident Physician     Patient is a 69y old  Male who presents with a chief complaint of Foot infection (2025 11:55)      SUBJECTIVE / OVERNIGHT EVENTS:  -NAEON    MEDICATIONS  (STANDING):  clopidogrel Tablet 75 milliGRAM(s) Oral daily  dextrose 5%. 1000 milliLiter(s) (50 mL/Hr) IV Continuous <Continuous>  dextrose 5%. 1000 milliLiter(s) (100 mL/Hr) IV Continuous <Continuous>  dextrose 50% Injectable 25 Gram(s) IV Push once  dextrose 50% Injectable 12.5 Gram(s) IV Push once  dextrose 50% Injectable 25 Gram(s) IV Push once  dextrose Oral Gel 15 Gram(s) Oral once  furosemide   Injectable 40 milliGRAM(s) IV Push every 12 hours  glucagon  Injectable 1 milliGRAM(s) IntraMuscular once  insulin glargine Injectable (LANTUS) 10 Unit(s) SubCutaneous at bedtime  insulin lispro (ADMELOG) corrective regimen sliding scale   SubCutaneous three times a day before meals  insulin lispro (ADMELOG) corrective regimen sliding scale   SubCutaneous at bedtime  insulin lispro Injectable (ADMELOG) 11 Unit(s) SubCutaneous before breakfast  insulin lispro Injectable (ADMELOG) 11 Unit(s) SubCutaneous before lunch  insulin lispro Injectable (ADMELOG) 11 Unit(s) SubCutaneous before dinner  isosorbide   dinitrate Tablet (ISORDIL) 10 milliGRAM(s) Oral three times a day  metoprolol succinate ER 50 milliGRAM(s) Oral daily  mupirocin 2% Ointment 1 Application(s) Topical <User Schedule>  piperacillin/tazobactam IVPB.. 3.375 Gram(s) IV Intermittent every 8 hours  rivaroxaban 15 milliGRAM(s) Oral with dinner  sodium bicarbonate 650 milliGRAM(s) Oral three times a day  spironolactone 50 milliGRAM(s) Oral daily    MEDICATIONS  (PRN):  acetaminophen     Tablet .. 650 milliGRAM(s) Oral every 6 hours PRN Temp greater or equal to 38C (100.4F), Mild Pain (1 - 3)    Allergies    atorvastatin (Muscle Pain (Severe))    Intolerances        Vital Signs Last 24 Hrs  T(C): 36.9 (2025 04:02), Max: 37.2 (2025 10:59)  T(F): 98.4 (2025 04:02), Max: 98.9 (2025 10:59)  HR: 80 (2025 05:28) (69 - 85)  BP: 136/76 (2025 05:28) (131/69 - 136/76)  BP(mean): --  RR: 18 (2025 04:02) (18 - 18)  SpO2: 97% (2025 04:02) (97% - 97%)    Parameters below as of 2025 04:02  Patient On (Oxygen Delivery Method): room air      Daily     Daily Weight in k.4 (2025 05:28)  I&O's Summary    2025 07:01  -  2025 07:00  --------------------------------------------------------  IN: 600 mL / OUT: 3100 mL / NET: -2500 mL        Physical Exam  GENERAL: NAD, lying in bed comfortably  HEAD: Atraumatic, normocephalic  EYES: EOMI, PERRLA, conjunctiva and sclera clear  ENT: Moist mucous membranes  NECK: Supple, no JVD  HEART: S1, S2, Regular rate and rhythm, no murmurs, rubs, or gallops  LUNGS: Unlabored respirations, clear to auscultation bilaterally, no crackles, wheezing, or rhonchi  ABDOMEN: Soft, nontender, nondistended, +BS  EXTREMITIES: 2+ peripheral pulses bilaterally. No clubbing, cyanosis, or edema; (+) Left foot wound  NERVOUS SYSTEM:  A&Ox3, no focal deficits   SKIN: No rashes or lesions      DIAGNOSTICS:                         14.7   8.74  )-----------( 227      ( 2025 06:10 )             44.1     Hgb Trend: 14.7<--, 15.2<--, 16.0<--  01-19    139  |  100  |  54[H]  ----------------------------<  164[H]  3.9   |  22  |  2.39[H]    Ca    9.1      2025 06:11  Phos  4.5       Mg     2.3         TPro  6.2  /  Alb  3.7  /  TBili  1.2  /  DBili  x   /  AST  38  /  ALT  39  /  AlkPhos  193[H]      CAPILLARY BLOOD GLUCOSE      POCT Blood Glucose.: 337 mg/dL (2025 21:18)  POCT Blood Glucose.: 84 mg/dL (2025 17:28)  POCT Blood Glucose.: 64 mg/dL (2025 16:53)  POCT Blood Glucose.: 82 mg/dL (2025 11:36)  POCT Blood Glucose.: 76 mg/dL (2025 07:49)    Creatinine Trend: 2.39<--, 1.98<--, 1.75<--  LIVER FUNCTIONS - ( 2025 06:11 )  Alb: 3.7 g/dL / Pro: 6.2 g/dL / ALK PHOS: 193 U/L / ALT: 39 U/L / AST: 38 U/L / GGT: x           PT/INR - ( 2025 08:54 )   PT: 19.0 sec;   INR: 1.68 ratio         PTT - ( 2025 08:54 )  PTT:39.1 sec      Urinalysis Basic - ( 2025 06:11 )    Color: x / Appearance: x / SG: x / pH: x  Gluc: 164 mg/dL / Ketone: x  / Bili: x / Urobili: x   Blood: x / Protein: x / Nitrite: x   Leuk Esterase: x / RBC: x / WBC x   Sq Epi: x / Non Sq Epi: x / Bacteria: x

## 2025-01-19 NOTE — CONSULT NOTE ADULT - SUBJECTIVE AND OBJECTIVE BOX
Patient is a 69y old  Male who presents with a chief complaint of left foot infection (2025 08:00)      HPI:  68 y/o male with hx of CAD s/p 10 stents, HFrEF (EF~45% as of 23), A-fib on xarelto, HTN, HLD, T2DM on insulin, CKD3 pw SOB/CLEARY for past few weeks and L foot wound.     The patient had a scratch on the sole of his left foot 1 week ago and which opened up. He went to his pcp and was sent into the ED by his PCP. He denies any fevers or chills but reports increasing redness and warmth of his leg.   He has also had increasing dyspnea on mild exertion over the past couple weeks and gets winded even with going up a flight of stairs. At the ED the patient's VSS on RA. He was admitted for further workup of dyspnea on exertion and management of his left foot wound.    (2025 05:11)    Pt follows with Dr Markell Walton as an OP. Last several visits his Cr has ranged typically approx 1.9-2.2. He did have one value at 1.6 however that was 2024. since then 1.9-2.2 moreso. admitted with Cr 1.75 which has then risen to 2.39 now. s/p lasix as well as farxiga and started on unasyn today. + vanc and zosyn prior for LLE cellulitis.     Feels will no complaints. no NSAIDS at home. is net neg 5L since admission       PAST MEDICAL & SURGICAL HISTORY:  Former smoker      CAD in native artery      Type 2 diabetes mellitus      Hyperlipidemia, unspecified hyperlipidemia type      Essential hypertension, hypertension with unspecified goal      Afib      Hematoma of leg      Stented coronary artery      CHF (congestive heart failure)      s/p Carotid Endarterectomy  left          MEDICATIONS  (STANDING):  ampicillin/sulbactam  IVPB 3 Gram(s) IV Intermittent every 12 hours  clopidogrel Tablet 75 milliGRAM(s) Oral daily  dextrose 5%. 1000 milliLiter(s) (50 mL/Hr) IV Continuous <Continuous>  dextrose 5%. 1000 milliLiter(s) (100 mL/Hr) IV Continuous <Continuous>  dextrose 50% Injectable 25 Gram(s) IV Push once  dextrose 50% Injectable 12.5 Gram(s) IV Push once  dextrose 50% Injectable 25 Gram(s) IV Push once  dextrose Oral Gel 15 Gram(s) Oral once  glucagon  Injectable 1 milliGRAM(s) IntraMuscular once  insulin glargine Injectable (LANTUS) 10 Unit(s) SubCutaneous at bedtime  insulin lispro (ADMELOG) corrective regimen sliding scale   SubCutaneous three times a day before meals  insulin lispro (ADMELOG) corrective regimen sliding scale   SubCutaneous at bedtime  insulin lispro Injectable (ADMELOG) 11 Unit(s) SubCutaneous before breakfast  insulin lispro Injectable (ADMELOG) 11 Unit(s) SubCutaneous before lunch  insulin lispro Injectable (ADMELOG) 11 Unit(s) SubCutaneous before dinner  isosorbide   dinitrate Tablet (ISORDIL) 10 milliGRAM(s) Oral three times a day  metoprolol succinate ER 50 milliGRAM(s) Oral daily  mupirocin 2% Nasal 1 Application(s) Both Nostrils two times a day  mupirocin 2% Ointment 1 Application(s) Topical <User Schedule>  rivaroxaban 15 milliGRAM(s) Oral with dinner  sodium bicarbonate 650 milliGRAM(s) Oral three times a day  sodium chloride 0.9%. 250 milliLiter(s) (50 mL/Hr) IV Continuous <Continuous>  spironolactone 50 milliGRAM(s) Oral daily      Allergies    atorvastatin (Muscle Pain (Severe))    Intolerances        SOCIAL HISTORY:  Denies ETOh,Smoking,     FAMILY HISTORY:  Family history of heart disease  father  age 71    FH: atrial fibrillation  sister        REVIEW OF SYSTEMS:  CONSTITUTIONAL: No weakness, fevers or chills  EYES/ENT: No visual changes;  No vertigo or throat pain   NECK: No pain or stiffness  RESPIRATORY: No cough, wheezing, hemoptysis; No shortness of breath  CARDIOVASCULAR: No chest pain or palpitations  GASTROINTESTINAL: No abdominal or epigastric pain. No nausea, vomiting, or hematemesis; No diarrhea or constipation. No melena or hematochezia.  GENITOURINARY: No dysuria, frequency or hematuria  NEUROLOGICAL: No numbness or weakness  SKIN: No itching, burning, rashes, or lesions   All other review of systems is negative unless indicated above.    VITAL:  T(C): , Max: 37.1 (25 @ 21:06)  T(F): , Max: 98.7 (25 @ 21:06)  HR: 72 (25 @ 11:21)  BP: 144/71 (25 @ 11:21)  BP(mean): --  RR: 18 (25 @ 11:21)  SpO2: 96% (25 @ 11:21)  Wt(kg): --    PHYSICAL EXAM:  Constitutional: NAD, Alert  HEENT: NCAT, MMM  Neck: Supple, No JVD  Respiratory: CTA-b/l  Cardiovascular: RRR s1s2, no m/r/g  Gastrointestinal: BS+, soft, NT/ND  Extremities: No peripheral edema b/l  Neurological: no focal deficits; strength grossly intact  Back: no CVAT b/l  Skin: No rashes, no nevi    LABS:                        14.7   8.74  )-----------( 227      ( 2025 06:10 )             44.1     Na(139)/K(3.9)/Cl(100)/HCO3(22)/BUN(54)/Cr(2.39)Glu(164)/Ca(9.1)/Mg(2.3)/PO4(4.5)     @ 06:11  Na(142)/K(4.9)/Cl(104)/HCO3(24)/BUN(40)/Cr(1.98)Glu(79)/Ca(9.9)/Mg(2.3)/PO4(3.9)     @ 08:54  Na(134)/K(4.4)/Cl(100)/HCO3(16)/BUN(46)/Cr(1.75)Glu(151)/Ca(9.1)/Mg(--)/PO4(--)     @ 19:13    Urinalysis Basic - ( 2025 06:11 )    Color: x / Appearance: x / SG: x / pH: x  Gluc: 164 mg/dL / Ketone: x  / Bili: x / Urobili: x   Blood: x / Protein: x / Nitrite: x   Leuk Esterase: x / RBC: x / WBC x   Sq Epi: x / Non Sq Epi: x / Bacteria: x            IMAGING:    ASSESSMENT:  68 y/o male with hx of CAD s/p 10 stents, HFrEF (EF~45% as of 23), A-fib on xarelto, HTN, HLD, T2DM on insulin, CKD3 pw SOB/CLEARY for past few weeks and L foot wound. Nephrology consulted for BARRY on CKD IV    NonOliguric BARRY stage I on CKD stage IV  -BL Cr 1.9-2.2  -CKD 2/2 HTN   -suspect BARRY is 2/2 reduced effective arterial volume 2/2 CRS vs diuresis    CHF exacerbation, improved  -lasix 40 mg PO BID at home  -diuresed well here and net -5L as of 25    CAD  -needs cath?    RECOMMEND:  -UA, Dex, Upcr, uric acid, post void residual scan, renal US reviewed  -a/w holding diuresis today  -START NS @ 50 x5 hours  -daily BMP + phos  -Dose Rx for CrCl 25-30mL/min     Thank you for involving [facility] in this patient's care.    With warm regards,    Bran Velásquez DO   [facility]  Office: [phone]

## 2025-01-20 ENCOUNTER — RESULT REVIEW (OUTPATIENT)
Age: 70
End: 2025-01-20

## 2025-01-20 LAB
ALBUMIN SERPL ELPH-MCNC: 3.7 G/DL — SIGNIFICANT CHANGE UP (ref 3.3–5)
ALP SERPL-CCNC: 159 U/L — HIGH (ref 40–120)
ALT FLD-CCNC: 28 U/L — SIGNIFICANT CHANGE UP (ref 10–45)
ANION GAP SERPL CALC-SCNC: 16 MMOL/L — SIGNIFICANT CHANGE UP (ref 5–17)
AST SERPL-CCNC: 30 U/L — SIGNIFICANT CHANGE UP (ref 10–40)
BASOPHILS # BLD AUTO: 0.04 K/UL — SIGNIFICANT CHANGE UP (ref 0–0.2)
BASOPHILS NFR BLD AUTO: 0.5 % — SIGNIFICANT CHANGE UP (ref 0–2)
BILIRUB SERPL-MCNC: 0.9 MG/DL — SIGNIFICANT CHANGE UP (ref 0.2–1.2)
BUN SERPL-MCNC: 52 MG/DL — HIGH (ref 7–23)
CALCIUM SERPL-MCNC: 9.5 MG/DL — SIGNIFICANT CHANGE UP (ref 8.4–10.5)
CHLORIDE SERPL-SCNC: 104 MMOL/L — SIGNIFICANT CHANGE UP (ref 96–108)
CO2 SERPL-SCNC: 21 MMOL/L — LOW (ref 22–31)
CREAT SERPL-MCNC: 2.21 MG/DL — HIGH (ref 0.5–1.3)
EGFR: 32 ML/MIN/1.73M2 — LOW
EOSINOPHIL # BLD AUTO: 0.17 K/UL — SIGNIFICANT CHANGE UP (ref 0–0.5)
EOSINOPHIL NFR BLD AUTO: 2.2 % — SIGNIFICANT CHANGE UP (ref 0–6)
GLUCOSE BLDC GLUCOMTR-MCNC: 164 MG/DL — HIGH (ref 70–99)
GLUCOSE BLDC GLUCOMTR-MCNC: 166 MG/DL — HIGH (ref 70–99)
GLUCOSE BLDC GLUCOMTR-MCNC: 210 MG/DL — HIGH (ref 70–99)
GLUCOSE BLDC GLUCOMTR-MCNC: 84 MG/DL — SIGNIFICANT CHANGE UP (ref 70–99)
GLUCOSE SERPL-MCNC: 86 MG/DL — SIGNIFICANT CHANGE UP (ref 70–99)
GRAM STN FLD: SIGNIFICANT CHANGE UP
HCT VFR BLD CALC: 43.7 % — SIGNIFICANT CHANGE UP (ref 39–50)
HGB BLD-MCNC: 14.3 G/DL — SIGNIFICANT CHANGE UP (ref 13–17)
IMM GRANULOCYTES NFR BLD AUTO: 0.4 % — SIGNIFICANT CHANGE UP (ref 0–0.9)
LYMPHOCYTES # BLD AUTO: 0.97 K/UL — LOW (ref 1–3.3)
LYMPHOCYTES # BLD AUTO: 12.8 % — LOW (ref 13–44)
MAGNESIUM SERPL-MCNC: 2.4 MG/DL — SIGNIFICANT CHANGE UP (ref 1.6–2.6)
MCHC RBC-ENTMCNC: 30.4 PG — SIGNIFICANT CHANGE UP (ref 27–34)
MCHC RBC-ENTMCNC: 32.7 G/DL — SIGNIFICANT CHANGE UP (ref 32–36)
MCV RBC AUTO: 93 FL — SIGNIFICANT CHANGE UP (ref 80–100)
MONOCYTES # BLD AUTO: 0.78 K/UL — SIGNIFICANT CHANGE UP (ref 0–0.9)
MONOCYTES NFR BLD AUTO: 10.3 % — SIGNIFICANT CHANGE UP (ref 2–14)
NEUTROPHILS # BLD AUTO: 5.59 K/UL — SIGNIFICANT CHANGE UP (ref 1.8–7.4)
NEUTROPHILS NFR BLD AUTO: 73.8 % — SIGNIFICANT CHANGE UP (ref 43–77)
NRBC # BLD: 0 /100 WBCS — SIGNIFICANT CHANGE UP (ref 0–0)
NRBC BLD-RTO: 0 /100 WBCS — SIGNIFICANT CHANGE UP (ref 0–0)
PHOSPHATE SERPL-MCNC: 4.4 MG/DL — SIGNIFICANT CHANGE UP (ref 2.5–4.5)
PLATELET # BLD AUTO: 215 K/UL — SIGNIFICANT CHANGE UP (ref 150–400)
POTASSIUM SERPL-MCNC: 3.6 MMOL/L — SIGNIFICANT CHANGE UP (ref 3.5–5.3)
POTASSIUM SERPL-SCNC: 3.6 MMOL/L — SIGNIFICANT CHANGE UP (ref 3.5–5.3)
PROT SERPL-MCNC: 5.7 G/DL — LOW (ref 6–8.3)
RBC # BLD: 4.7 M/UL — SIGNIFICANT CHANGE UP (ref 4.2–5.8)
RBC # FLD: 13.6 % — SIGNIFICANT CHANGE UP (ref 10.3–14.5)
SODIUM SERPL-SCNC: 141 MMOL/L — SIGNIFICANT CHANGE UP (ref 135–145)
SPECIMEN SOURCE: SIGNIFICANT CHANGE UP
WBC # BLD: 7.58 K/UL — SIGNIFICANT CHANGE UP (ref 3.8–10.5)
WBC # FLD AUTO: 7.58 K/UL — SIGNIFICANT CHANGE UP (ref 3.8–10.5)

## 2025-01-20 PROCEDURE — 99232 SBSQ HOSP IP/OBS MODERATE 35: CPT | Mod: GC

## 2025-01-20 PROCEDURE — 93306 TTE W/DOPPLER COMPLETE: CPT | Mod: 26

## 2025-01-20 PROCEDURE — 99223 1ST HOSP IP/OBS HIGH 75: CPT

## 2025-01-20 RX ADMIN — AMPICILLIN SODIUM AND SULBACTAM SODIUM 200 GRAM(S): 2; 1 INJECTION, POWDER, FOR SOLUTION INTRAMUSCULAR; INTRAVENOUS at 05:01

## 2025-01-20 RX ADMIN — AMPICILLIN SODIUM AND SULBACTAM SODIUM 200 GRAM(S): 2; 1 INJECTION, POWDER, FOR SOLUTION INTRAMUSCULAR; INTRAVENOUS at 17:31

## 2025-01-20 RX ADMIN — Medication 1: at 17:31

## 2025-01-20 RX ADMIN — RIVAROXABAN 15 MILLIGRAM(S): 20 TABLET, FILM COATED ORAL at 17:30

## 2025-01-20 RX ADMIN — SODIUM BICARBONATE 650 MILLIGRAM(S): 42 INJECTION, SOLUTION INTRAVENOUS at 05:01

## 2025-01-20 RX ADMIN — Medication 40 MILLIGRAM(S): at 17:30

## 2025-01-20 RX ADMIN — Medication 50 MILLIGRAM(S): at 05:01

## 2025-01-20 RX ADMIN — ISOSORBIDE DINITRATE 10 MILLIGRAM(S): 40 TABLET ORAL at 17:30

## 2025-01-20 RX ADMIN — Medication 40 MILLIGRAM(S): at 11:54

## 2025-01-20 RX ADMIN — MUPIROCIN 1 APPLICATION(S): 2 CREAM TOPICAL at 05:02

## 2025-01-20 RX ADMIN — Medication 11 UNIT(S): at 17:31

## 2025-01-20 RX ADMIN — ISOSORBIDE DINITRATE 10 MILLIGRAM(S): 40 TABLET ORAL at 11:54

## 2025-01-20 RX ADMIN — Medication 75 MILLIGRAM(S): at 11:54

## 2025-01-20 RX ADMIN — SODIUM BICARBONATE 650 MILLIGRAM(S): 42 INJECTION, SOLUTION INTRAVENOUS at 21:54

## 2025-01-20 RX ADMIN — ISOSORBIDE DINITRATE 10 MILLIGRAM(S): 40 TABLET ORAL at 05:01

## 2025-01-20 RX ADMIN — Medication 11 UNIT(S): at 11:53

## 2025-01-20 RX ADMIN — SODIUM BICARBONATE 650 MILLIGRAM(S): 42 INJECTION, SOLUTION INTRAVENOUS at 13:50

## 2025-01-20 RX ADMIN — INSULIN GLARGINE-YFGN 10 UNIT(S): 100 INJECTION, SOLUTION SUBCUTANEOUS at 21:54

## 2025-01-20 RX ADMIN — MUPIROCIN 1 APPLICATION(S): 2 CREAM TOPICAL at 17:30

## 2025-01-20 RX ADMIN — Medication 2: at 11:53

## 2025-01-20 NOTE — PROGRESS NOTE ADULT - SUBJECTIVE AND OBJECTIVE BOX
SUBJECTIVE: Asymptomatic in bed.   	  MEDICATIONS:  isosorbide   dinitrate Tablet (ISORDIL) 10 milliGRAM(s) Oral three times a day  metoprolol succinate ER 50 milliGRAM(s) Oral daily  spironolactone 50 milliGRAM(s) Oral daily  ampicillin/sulbactam  IVPB 3 Gram(s) IV Intermittent every 12 hours  acetaminophen     Tablet .. 650 milliGRAM(s) Oral every 6 hours PRN  dextrose 50% Injectable 25 Gram(s) IV Push once  dextrose 50% Injectable 12.5 Gram(s) IV Push once  dextrose 50% Injectable 25 Gram(s) IV Push once  dextrose Oral Gel 15 Gram(s) Oral once  glucagon  Injectable 1 milliGRAM(s) IntraMuscular once  insulin glargine Injectable (LANTUS) 10 Unit(s) SubCutaneous at bedtime  insulin lispro (ADMELOG) corrective regimen sliding scale   SubCutaneous three times a day before meals  insulin lispro (ADMELOG) corrective regimen sliding scale   SubCutaneous at bedtime  insulin lispro Injectable (ADMELOG) 11 Unit(s) SubCutaneous before breakfast  insulin lispro Injectable (ADMELOG) 11 Unit(s) SubCutaneous before lunch  insulin lispro Injectable (ADMELOG) 11 Unit(s) SubCutaneous before dinner  clopidogrel Tablet 75 milliGRAM(s) Oral daily  dextrose 5%. 1000 milliLiter(s) IV Continuous <Continuous>  dextrose 5%. 1000 milliLiter(s) IV Continuous <Continuous>  influenza  Vaccine (HIGH DOSE) 0.5 milliLiter(s) IntraMuscular once  mupirocin 2% Nasal 1 Application(s) Both Nostrils two times a day  mupirocin 2% Ointment 1 Application(s) Topical <User Schedule>  rivaroxaban 15 milliGRAM(s) Oral with dinner  sodium bicarbonate 650 milliGRAM(s) Oral three times a day  sodium chloride 0.9%. 250 milliLiter(s) IV Continuous <Continuous>      REVIEW OF SYSTEMS:    CONSTITUTIONAL: No fever, weight loss, or fatigue  EYES: No eye pain, visual disturbances, or discharge  NECK: No pain or stiffness  RESPIRATORY: No cough, wheezing, chills or hemoptysis; No Shortness of Breath  CARDIOVASCULAR: No chest pain, palpitations, dizziness, or leg swelling  GASTROINTESTINAL: No abdominal or epigastric pain. No nausea, vomiting, or hematemesis; No diarrhea or constipation. No melena or hematochezia.  GENITOURINARY: No dysuria, frequency, hematuria, or incontinence  NEUROLOGICAL: No headaches, memory loss, loss of strength, numbness, or tremors  SKIN: No itching, burning, rashes, or lesions   LYMPH Nodes: No enlarged glands  MUSCULOSKELETAL: No joint pain or swelling; No muscle, back, or extremity pain  All other review of systems are negative.  	      PHYSICAL EXAM:  T(C): 36.3 (01-20-25 @ 10:57), Max: 37 (01-19-25 @ 11:21)  HR: 81 (01-20-25 @ 10:57) (59 - 81)  BP: 150/85 (01-20-25 @ 10:57) (135/63 - 154/67)  RR: 18 (01-20-25 @ 10:57) (18 - 18)  SpO2: 92% (01-20-25 @ 10:57) (92% - 99%)  Wt(kg): --  I&O's Summary    19 Jan 2025 07:01  -  20 Jan 2025 07:00  --------------------------------------------------------  IN: 360 mL / OUT: 3800 mL / NET: -3440 mL    20 Jan 2025 07:01  -  20 Jan 2025 10:59  --------------------------------------------------------  IN: 240 mL / OUT: 0 mL / NET: 240 mL          PHYSICAL EXAM    Appearance: Normal	  HEENT:   Normal oral mucosa, PERRL, EOMI	  NECK: Soft and supple, No LAD, No JVD  Cardiovascular: Regular Rate and Rhythm, Normal S1 S2, No murmurs, No clicks, gallops or rubs  Respiratory: Lungs clear to auscultation		  Skin: No rashes, No ecchymoses, No cyanosis  Neurologic: Non-focal  Extremities: No clubbing, cyanosis or edema    LABS:	 	                 14.3   7.58  )-----------( 215      ( 20 Jan 2025 06:42 )             43.7     01-20    141  |  104  |  52[H]  ----------------------------<  86  3.6   |  21[L]  |  2.21[H]    Ca    9.5      20 Jan 2025 06:42  Phos  4.4     01-20  Mg     2.4     01-20    TPro  5.7[L]  /  Alb  3.7  /  TBili  0.9  /  DBili  x   /  AST  30  /  ALT  28  /  AlkPhos  159[H]  01-20

## 2025-01-20 NOTE — CONSULT NOTE ADULT - PROBLEM SELECTOR RECOMMENDATION 9
I Tyrone Calle MD have participated in the daily care of this patient and discussed  the findings and plan with the house officer. I reviewed the resident note and agree with the findings and plan

## 2025-01-20 NOTE — CONSULT NOTE ADULT - MOTOR
mild  arterial insuff w moderate  trophic skin changes  Pulse exam                         right         left   femoral        +2            +2  dpa                +1            +1  pta                 +1            +1  LLE Wound  w cellulitis  1cm diam w limited granulation tissue

## 2025-01-20 NOTE — PROGRESS NOTE ADULT - SUBJECTIVE AND OBJECTIVE BOX
Overnight events noted      VITAL:  T(C): , Max: 37 (01-19-25 @ 11:21)  T(F): , Max: 98.6 (01-19-25 @ 11:21)  HR: 59 (01-20-25 @ 04:16)  BP: 154/67 (01-20-25 @ 04:16)  RR: 18 (01-20-25 @ 04:16)  SpO2: 99% (01-20-25 @ 04:16)      PHYSICAL EXAM:  Constitutional: NAD, Alert  HEENT: NCAT, MMM  Neck: Supple, No JVD  Respiratory: CTA-b/l  Cardiovascular: stevo s1s2  Gastrointestinal: BS+, soft, NT/ND  Extremities: No peripheral edema b/l  Neurological: no focal deficits; strength grossly intact  Back: no CVAT b/l  Skin: No rashes, no nevi    LABS:                        14.3   7.58  )-----------( 215      ( 20 Jan 2025 06:42 )             43.7     Na(141)/K(3.6)/Cl(104)/HCO3(21)/BUN(52)/Cr(2.21)Glu(86)/Ca(9.5)/Mg(2.4)/PO4(4.4)    01-20 @ 06:42  Na(139)/K(3.9)/Cl(100)/HCO3(22)/BUN(54)/Cr(2.39)Glu(164)/Ca(9.1)/Mg(2.3)/PO4(4.5)    01-19 @ 06:11  Na(142)/K(4.9)/Cl(104)/HCO3(24)/BUN(40)/Cr(1.98)Glu(79)/Ca(9.9)/Mg(2.3)/PO4(3.9)    01-18 @ 08:54  Na(134)/K(4.4)/Cl(100)/HCO3(16)/BUN(46)/Cr(1.75)Glu(151)/Ca(9.1)/Mg(--)/PO4(--)    01-17 @ 19:13      Sodium, Random Urine: 67 mmol/L (01-19 @ 19:15)  Creatinine, Random Urine: 46 mg/dL (01-19 @ 19:15)  Protein/Creatinine Ratio Calculation: 0.3 Ratio (01-19 @ 19:15)        IMPRESSION: 69M w/ HTN, DM2, AFib, CAD-10 stents, HFrEF, and CKD, 1/18/25 p/w L foot wound and acute on chronic HFrEF    (1)CKD - 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 At/near baseline  (2)Metabolic acidosis - controlled, on PO NaHCO3  (3)Hypokalemia - controlled, on Spironolactone  (4)ID - L foot wound infection - on IV Unasyn  (5)CV - acceptable c      RECOMMEND:  -UA, Dex, Upcr, uric acid, post void residual scan, renal US reviewed  -a/w holding diuresis today  -START NS @ 50 x5 hours  -daily BMP + phos  -Dose Rx for CrCl 25-30mL/min     Markell Walton MD  [facility]  Office/on call physician: [phone]  Cell (9u-9y): [phone]       Overnight events noted      VITAL:  T(C): , Max: 37 (01-19-25 @ 11:21)  T(F): , Max: 98.6 (01-19-25 @ 11:21)  HR: 59 (01-20-25 @ 04:16)  BP: 154/67 (01-20-25 @ 04:16)  RR: 18 (01-20-25 @ 04:16)  SpO2: 99% (01-20-25 @ 04:16)      PHYSICAL EXAM:  Constitutional: NAD, Alert  HEENT: NCAT, MMM  Neck: Supple, No JVD  Respiratory: CTA-b/l  Cardiovascular: stevo s1s2  Gastrointestinal: BS+, soft, NT/ND  Extremities: No peripheral edema b/l  Neurological: no focal deficits; strength grossly intact  Back: no CVAT b/l  Skin: No rashes, no nevi    LABS:                        14.3   7.58  )-----------( 215      ( 20 Jan 2025 06:42 )             43.7     Na(141)/K(3.6)/Cl(104)/HCO3(21)/BUN(52)/Cr(2.21)Glu(86)/Ca(9.5)/Mg(2.4)/PO4(4.4)    01-20 @ 06:42  Na(139)/K(3.9)/Cl(100)/HCO3(22)/BUN(54)/Cr(2.39)Glu(164)/Ca(9.1)/Mg(2.3)/PO4(4.5)    01-19 @ 06:11  Na(142)/K(4.9)/Cl(104)/HCO3(24)/BUN(40)/Cr(1.98)Glu(79)/Ca(9.9)/Mg(2.3)/PO4(3.9)    01-18 @ 08:54  Na(134)/K(4.4)/Cl(100)/HCO3(16)/BUN(46)/Cr(1.75)Glu(151)/Ca(9.1)/Mg(--)/PO4(--)    01-17 @ 19:13      Sodium, Random Urine: 67 mmol/L (01-19 @ 19:15)  Creatinine, Random Urine: 46 mg/dL (01-19 @ 19:15)  Protein/Creatinine Ratio Calculation: 0.3 Ratio (01-19 @ 19:15)        IMPRESSION: 69M w/ HTN, DM2, AFib, CAD-10 stents, HFrEF, and CKD, 1/18/25 p/w L foot wound and acute on chronic HFrEF    (1)CKD - 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 At/near baseline  (2)Metabolic acidosis - controlled, on PO NaHCO3  (3)Hypokalemia - controlled, on Spironolactone  (4)ID - L foot wound infection - on IV Unasyn  (5)CV - takes Lasix 40mg po bid at home - no renal contraindication to adding back      RECOMMEND:  (1)Can add back Lasix as taken at home          Markell Walton MD  [facility]  Office/on call physician: [phone]  Cell (7a-7p): [phone]       No SOB at rest, (+)CLEARY  No chest pain      VITAL:  T(C): , Max: 37 (01-19-25 @ 11:21)  T(F): , Max: 98.6 (01-19-25 @ 11:21)  HR: 59 (01-20-25 @ 04:16)  BP: 154/67 (01-20-25 @ 04:16)  RR: 18 (01-20-25 @ 04:16)  SpO2: 99% (01-20-25 @ 04:16)      PHYSICAL EXAM:  Constitutional: NAD, Alert  HEENT: NCAT, MMM  Neck: Supple, No JVD  Respiratory: CTA-b/l  Cardiovascular: stevo s1s2  Gastrointestinal: BS+, soft, NT/ND  Extremities: No peripheral edema b/l  Neurological: no focal deficits; strength grossly intact  Back: no CVAT b/l  Skin: No rashes, no nevi    LABS:                        14.3   7.58  )-----------( 215      ( 20 Jan 2025 06:42 )             43.7     Na(141)/K(3.6)/Cl(104)/HCO3(21)/BUN(52)/Cr(2.21)Glu(86)/Ca(9.5)/Mg(2.4)/PO4(4.4)    01-20 @ 06:42  Na(139)/K(3.9)/Cl(100)/HCO3(22)/BUN(54)/Cr(2.39)Glu(164)/Ca(9.1)/Mg(2.3)/PO4(4.5)    01-19 @ 06:11  Na(142)/K(4.9)/Cl(104)/HCO3(24)/BUN(40)/Cr(1.98)Glu(79)/Ca(9.9)/Mg(2.3)/PO4(3.9)    01-18 @ 08:54  Na(134)/K(4.4)/Cl(100)/HCO3(16)/BUN(46)/Cr(1.75)Glu(151)/Ca(9.1)/Mg(--)/PO4(--)    01-17 @ 19:13      Sodium, Random Urine: 67 mmol/L (01-19 @ 19:15)  Creatinine, Random Urine: 46 mg/dL (01-19 @ 19:15)  Protein/Creatinine Ratio Calculation: 0.3 Ratio (01-19 @ 19:15)        IMPRESSION: 69M w/ HTN, DM2, AFib, CAD-10 stents, HFrEF, and CKD, 1/18/25 p/w L foot wound and acute on chronic HFrEF    (1)CKD - 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 At/near baseline  (2)Metabolic acidosis - controlled, on PO NaHCO3  (3)Hypokalemia - controlled, on Spironolactone  (4)ID - L foot wound infection - on IV Unasyn  (5)CV - takes Lasix 40mg po bid at home - no renal contraindication to adding back      RECOMMEND:  (1)Uptitration of diuretic regimen; whereas I would opt for now for Lasix 40mg po bid + Spironolactone 50qd, I would not object to use of IV diuretics at this time          Markell Walton MD  [facility]  Office/on call physician: [phone]  Cell (7a-7u): [phone]

## 2025-01-20 NOTE — PROGRESS NOTE ADULT - ASSESSMENT
#h/o ashd s/p multiple pci, last pci dLAD 4/17/24 by Dr ILDA Dominguez  #HTN  #DM (Dr Reyes)  #jesika on ckd (Dr Christophe Walton), diuretic on hold.   #PAD, s/p cea by Dr Calle  # hfref ef last 47%  #AF on xarelto  #Hypokalemia: follow electrolytes carefully.

## 2025-01-20 NOTE — PROGRESS NOTE ADULT - SUBJECTIVE AND OBJECTIVE BOX
PROGRESS NOTE:     Patient is a 69y old  Male who presents with a chief complaint of left foot infection (2025 14:50)      INTERVAL EVENTS: No acute overnight events.     SUBJECTIVE: Patient seen and examined at bedside. This morning, the patient is comfortable and doing well. No acute complaints.    MEDICATIONS  (STANDING):  ampicillin/sulbactam  IVPB 3 Gram(s) IV Intermittent every 12 hours  clopidogrel Tablet 75 milliGRAM(s) Oral daily  dextrose 5%. 1000 milliLiter(s) (50 mL/Hr) IV Continuous <Continuous>  dextrose 5%. 1000 milliLiter(s) (100 mL/Hr) IV Continuous <Continuous>  dextrose 50% Injectable 25 Gram(s) IV Push once  dextrose 50% Injectable 12.5 Gram(s) IV Push once  dextrose 50% Injectable 25 Gram(s) IV Push once  dextrose Oral Gel 15 Gram(s) Oral once  glucagon  Injectable 1 milliGRAM(s) IntraMuscular once  insulin glargine Injectable (LANTUS) 10 Unit(s) SubCutaneous at bedtime  insulin lispro (ADMELOG) corrective regimen sliding scale   SubCutaneous three times a day before meals  insulin lispro (ADMELOG) corrective regimen sliding scale   SubCutaneous at bedtime  insulin lispro Injectable (ADMELOG) 11 Unit(s) SubCutaneous before breakfast  insulin lispro Injectable (ADMELOG) 11 Unit(s) SubCutaneous before lunch  insulin lispro Injectable (ADMELOG) 11 Unit(s) SubCutaneous before dinner  isosorbide   dinitrate Tablet (ISORDIL) 10 milliGRAM(s) Oral three times a day  metoprolol succinate ER 50 milliGRAM(s) Oral daily  mupirocin 2% Nasal 1 Application(s) Both Nostrils two times a day  mupirocin 2% Ointment 1 Application(s) Topical <User Schedule>  rivaroxaban 15 milliGRAM(s) Oral with dinner  sodium bicarbonate 650 milliGRAM(s) Oral three times a day  sodium chloride 0.9%. 250 milliLiter(s) (50 mL/Hr) IV Continuous <Continuous>  spironolactone 50 milliGRAM(s) Oral daily    MEDICATIONS  (PRN):  acetaminophen     Tablet .. 650 milliGRAM(s) Oral every 6 hours PRN Temp greater or equal to 38C (100.4F), Mild Pain (1 - 3)      CAPILLARY BLOOD GLUCOSE      POCT Blood Glucose.: 147 mg/dL (2025 21:10)  POCT Blood Glucose.: 121 mg/dL (2025 16:50)  POCT Blood Glucose.: 132 mg/dL (2025 11:51)  POCT Blood Glucose.: 152 mg/dL (2025 08:08)    I&O's Summary    2025 07:01  -  2025 07:00  --------------------------------------------------------  IN: 360 mL / OUT: 3800 mL / NET: -3440 mL        PHYSICAL EXAM:  Vital Signs Last 24 Hrs  T(C): 36.3 (2025 04:16), Max: 37 (2025 11:21)  T(F): 97.4 (2025 04:16), Max: 98.6 (2025 11:21)  HR: 59 (2025 04:16) (59 - 72)  BP: 154/67 (2025 04:16) (135/63 - 154/67)  BP(mean): --  RR: 18 (2025 04:16) (18 - 18)  SpO2: 99% (2025 04:16) (96% - 99%)    Parameters below as of 2025 04:16  Patient On (Oxygen Delivery Method): room air        GENERAL: NAD, lying in bed comfortably  HEAD: Atraumatic, normocephalic  EYES: EOMI, PERRLA, conjunctiva and sclera clear  ENT: Moist mucous membranes  NECK: Supple, no JVD  HEART: S1, S2, Regular rate and rhythm, no murmurs, rubs, or gallops  LUNGS: Unlabored respirations, clear to auscultation bilaterally, no crackles, wheezing, or rhonchi  ABDOMEN: Soft, nontender, nondistended, +BS  EXTREMITIES: 2+ peripheral pulses bilaterally. No clubbing, cyanosis, or edema  NERVOUS SYSTEM:  A&Ox3, no focal deficits   SKIN: No rashes or lesions    LABS:                        14.3   7.58  )-----------( 215      ( 2025 06:42 )             43.7         139  |  100  |  54[H]  ----------------------------<  164[H]  3.9   |  22  |  2.39[H]    Ca    9.1      2025 06:11  Phos  4.5       Mg     2.3         TPro  6.2  /  Alb  3.7  /  TBili  1.2  /  DBili  x   /  AST  38  /  ALT  39  /  AlkPhos  193[H]      PT/INR - ( 2025 08:54 )   PT: 19.0 sec;   INR: 1.68 ratio         PTT - ( 2025 08:54 )  PTT:39.1 sec      Urinalysis Basic - ( 2025 19:15 )    Color: Yellow / Appearance: Clear / S.017 / pH: x  Gluc: x / Ketone: Negative mg/dL  / Bili: Negative / Urobili: 0.2 mg/dL   Blood: x / Protein: Trace mg/dL / Nitrite: Negative   Leuk Esterase: Negative / RBC: x / WBC x   Sq Epi: x / Non Sq Epi: x / Bacteria: x        Culture - Blood (collected 2025 18:45)  Source: .Blood BLOOD  Preliminary Report (2025 23:01):    No growth at 48 Hours    Culture - Blood (collected 2025 18:35)  Source: .Blood BLOOD  Preliminary Report (2025 23:01):    No growth at 48 Hours        RADIOLOGY & ADDITIONAL TESTS:  Results Reviewed:   Imaging Personally Reviewed:  Electrocardiogram Personally Reviewed:  Tele:

## 2025-01-20 NOTE — CONSULT NOTE ADULT - ASSESSMENT
69M pw left foot wound.     Recommendations:  -No acute vascular surgery intervention at this time.  -NIKHIL/PVR reviewed.  -Abx.  -Local wound care.    Discussed with vascular surgery fellow on behalf of Dr. Calle.    Vascular Surgery  Please page [phone] for all questions.   69M pw left foot wound.     Recommendations:  -will d/w pt lle angio   -NIKHIL/PVR reviewed.  -Abx.  -Local wound care.  will follow     Discussed with vascular surgery fellow on behalf of Dr. Calle.    Vascular Surgery  Please page [phone] for all questions.

## 2025-01-20 NOTE — PATIENT PROFILE ADULT - INTERNATIONAL TRAVEL
[facility] -   Infectious diseases evaluation  admission date 8/10/2021    reason for consultation:   MRSA bacteremia    Impression :   Current:  · MRSA bacteremia suspect pulmonary source of infection  · Left hand and wrist cellulitis  · Left wrist fracture  · Right pleural effusion /empyema status post thoracentesis with MRSA growth on culture. · Pneumothorax  · Superficial vein thrombophlebitis left arm  · Syncopal episode  · Alcohol withdrawal  · Acute respiratory failure requiring intubation  · Rib fracture with possible hemothorax    Other:  · History of alcohol abuse  · COPD  · History of depression  · History of seizure  · History of hypertension. Recommendations   · Continue IV vancomycin through September 10, 2021,monitor renal function and vancomycin levels closely  · Repeat blood cultures from 8/19/2021 no growth  · CT chest from yesterday reviewed  · Repeat blood cultures 8/16/2021 grew MRSA  · Echocardiogram , no reported vegetations  · ERICKA showed no vegetations. · CT thoracic and lumbar spine reviewed showed no discitis or osteomyelitis  · Follow CBC and renal function  · Cardiothoracic surgery evaluation noted              History of Present Illness:   Initial history:  Hazel Persaud is a 64y.o.-year-old male presented to the hospital on 8/10/2021 after a syncopal episode, was complaining of pain to the right chest wall and abdomen. Reported sharp, constant pain aggravated by movement with no alleviating factors. The patient was previously evaluated at the ER 8/7/2021 after a fall found to have multiple rib fractures and a left wrist closed fracture. Tox screen positive for cannabinoids. CT head negative for acute intracranial abnormality. CT chest/abdomen/pelvis showed rib fractures with associated focal hemothorax. No acute processes in abdomen or pelvis.    Right arm PICC line was placed 8/12/2021  He was placed on alcohol withdrawal protocol, developed respiratory distress was placed on BiPAP initially then was intubated 4/13/21. Baker catheter was placed on 4/13/2021  COVID-19 rapid test was negative  The patient was on Levaquin 8/13/2021 and 8/14/2021 that was discontinued and started on vancomycin and cefepime. He had a fever with a temperature max of 100.6 on 8/13/2021. Blood cultures 8/14/2021 grew MRSA as well as sputum culture. Chest x-ray 8/14/2021 showed bilateral airspace disease right greater than left. CT chest without contrast 8/16/2021 showed increased right pleural effusion with an area of loculated fluid status post thoracentesis with MRSA growth on culture  Right chest tube was placed 8/20/2021  Interval changes  8/24/2021   He is awake, feeling better, pain under control, status post thoracentesis yesterday, was started on TPA  He still coughing up yellow phlegm, denied shortness of breath, denied vomiting or diarrhea. Right arm PICC line in place  Renal function stable  Blood pressure stable     Patient Vitals for the past 8 hrs:   BP Temp Temp src Pulse Resp SpO2   08/24/21 1444 -- -- -- -- 18 91 %   08/24/21 1230 (!) 107/59 98.9 °F (37.2 °C) Oral 89 18 (!) 89 %   08/24/21 1059 -- -- -- -- 18 90 %           I have personally reviewed the past medical history, past surgical history, medications, social history, and family history, and I haveupdated the database accordingly. Allergies:   Nickel     Review of Systems:     Review of Systems  As per history present illness, other than above 14 system review was negative  Physical Examination :       Physical Exam  Constitutional:       General: He is not in acute distress. HENT:      Head: Normocephalic and atraumatic. Right Ear: External ear normal.      Left Ear: External ear normal.   Eyes:      General: No scleral icterus. Right eye: No discharge. Left eye: No discharge. Cardiovascular:      Rate and Rhythm: Normal rate and regular rhythm. Heart sounds: No murmur heard. Pulmonary:      Breath sounds: No wheezing. Comments: Right chest tube in place  Abdominal:      General: Abdomen is flat. There is no distension. Palpations: Abdomen is soft. Genitourinary:     Comments: Baker catheter in place  Musculoskeletal:      Right lower leg: Edema present. Left lower leg: Edema present. Comments: Bilateral upper extremity edema   Skin:     General: Skin is warm. Coloration: Skin is not jaundiced. Comments: Left hand and wrist erythema and warmth improved, no fluctuation, no crepitance   Neurological:      General: No focal deficit present. Mental Status: He is alert.      Right PICC line in place    Past Medical History:     Past Medical History:   Diagnosis Date    Alcohol abuse 6/4/2014    Anxiety     Arthritis     COPD (chronic obstructive pulmonary disease) ([address]     ASTHMA    DDD (degenerative disc disease), lumbar 9/6/2016    Depression     Heart burn     Hemorrhoids     HTN (hypertension) 1/19/2015    Ilioinguinal neuralgia of right side 4/10/2017    Psychiatric problem     depression /anxiety    Seizures ([address]     withdrawal from alcohol 2 years ago    Wears glasses     READING       Past Surgical  History:     Past Surgical History:   Procedure Laterality Date    ANESTHESIA NERVE BLOCK Right 4/10/2017    NERVE BLOCK US RIGHT SIDED ILIOINGUINAL performed by Saige Maza MD at [address] [place] [address]  3/19/15    HERNIA REPAIR      IR CHEST TUBE INSERTION  8/20/2021    IR CHEST TUBE INSERTION 8/20/2021 [facility]ED[facility]    NERVE BLOCK Right 10/10/2016    right groin    OTHER SURGICAL HISTORY Right 04/10/2017    US nerve block to R groin    TOE SURGERY      SCREW RIGHT BIG TOE       Medications:      famotidine  20 mg Oral BID    insulin lispro  0-12 Units Subcutaneous TID WC    insulin lispro  0-6 Units Subcutaneous Nightly    metoprolol succinate  12.5 mg Oral Nightly    No  Active Member of Clubs or Organizations:     Attends Club or Organization Meetings:     Marital Status:    Intimate Partner Violence:     Fear of Current or Ex-Partner:     Emotionally Abused:     Physically Abused:     Sexually Abused:        Family History:     Family History   Problem Relation Age of Onset    Cancer Mother         colon ca      Medical Decision Making:   I have independently reviewed/ordered the following labs:    CBC with Differential:   Recent Labs     08/22/21  0412 08/22/21  0412 08/23/21  0511 08/24/21  0506   WBC 12.5*   < > 12.8* 13.0*   HGB 8.8*   < > 9.4* 8.4*   HCT 26.8*   < > 28.1* 25.0*      < > 405 408   LYMPHOPCT 8*  --  8*  --    MONOPCT 4  --  6  --     < > = values in this interval not displayed. BMP:  Recent Labs     08/23/21  0511 08/23/21  1859 08/24/21  0506 08/24/21  1355      < > 135 137   K 3.8   < > 3.5* 3.8   *   < > 101 104   CO2 25   < > 26 25   BUN 17  --  13  --    CREATININE 1.02  --  1.08  --    MG 2.0  --  1.9  --     < > = values in this interval not displayed. Hepatic Function Panel:   Recent Labs     08/23/21  0511 08/24/21  0506   PROT 5.3* 5.1*   LABALBU 2.2* 2.0*   BILITOT 0.83 0.79   ALKPHOS 71 70   ALT 13 13   AST 15 17     No results for input(s): RPR in the last 72 hours. No results for input(s): HIV in the last 72 hours. No results for input(s): BC in the last 72 hours. Lab Results   Component Value Date    CREATININE 1.08 08/24/2021    GLUCOSE 89 08/24/2021       Detailed results: Thank you for allowing us to participate in the care of this patient. Please call with questions. This note is created with the assistance of a speech recognition program.  While intending to generate adocument that actually reflects the content of the visit, the document can still have some errors including those of syntax and sound a like substitutions which may escape proof reading.   It such instances, actual meaningcan be extrapolated by contextual diversion.     Peter Saunders MD  Office: [phone]  Perfect serve / office [phone]

## 2025-01-20 NOTE — PROGRESS NOTE ADULT - SUBJECTIVE AND OBJECTIVE BOX
Patient is a 69y old  Male who presents with a chief complaint of left foot infection (20 Jan 2025 08:31)       INTERVAL HPI/OVERNIGHT EVENTS:  Patient seen and evaluated at bedside.  Pt is resting comfortable in NAD. Denies N/V/F/C.    Allergies    atorvastatin (Muscle Pain (Severe))    Intolerances        Vital Signs Last 24 Hrs  T(C): 36.3 (20 Jan 2025 10:57), Max: 36.7 (20 Jan 2025 00:40)  T(F): 97.4 (20 Jan 2025 10:57), Max: 98.1 (20 Jan 2025 00:40)  HR: 81 (20 Jan 2025 10:57) (59 - 81)  BP: 150/85 (20 Jan 2025 10:57) (135/63 - 154/67)  BP(mean): --  RR: 18 (20 Jan 2025 10:57) (18 - 18)  SpO2: 92% (20 Jan 2025 10:57) (92% - 99%)    Parameters below as of 20 Jan 2025 10:57  Patient On (Oxygen Delivery Method): room air        LABS:                        14.3   7.58  )-----------( 215      ( 20 Jan 2025 06:42 )             43.7     01-20    141  |  104  |  52[H]  ----------------------------<  86  3.6   |  21[L]  |  2.21[H]    Ca    9.5      20 Jan 2025 06:42  Phos  4.4     01-20  Mg     2.4     01-20    TPro  5.7[L]  /  Alb  3.7  /  TBili  0.9  /  DBili  x   /  AST  30  /  ALT  28  /  AlkPhos  159[H]  01-20      Urinalysis Basic - ( 20 Jan 2025 06:42 )    Color: x / Appearance: x / SG: x / pH: x  Gluc: 86 mg/dL / Ketone: x  / Bili: x / Urobili: x   Blood: x / Protein: x / Nitrite: x   Leuk Esterase: x / RBC: x / WBC x   Sq Epi: x / Non Sq Epi: x / Bacteria: x      CAPILLARY BLOOD GLUCOSE      POCT Blood Glucose.: 210 mg/dL (20 Jan 2025 11:38)  POCT Blood Glucose.: 84 mg/dL (20 Jan 2025 07:50)  POCT Blood Glucose.: 147 mg/dL (19 Jan 2025 21:10)  POCT Blood Glucose.: 121 mg/dL (19 Jan 2025 16:50)      Lower Extremity Physical Exam:  Vascular: DP/PT 0/4, B/L, CFT <3 seconds B/L, Temperature gradient warm to cool, B/L.   Neuro: Epicritic sensation diminished to the level of digits, B/L.  Musculoskeletal/Ortho: unremarkable  Skin: Left foot dorsal midfoot wound to dermis with erythema extending from the base of digits 2-4 to the proximal midfoot, no drainage, no malodor. Mild improvement of erythema with elevation. Right foot no open wounds, no acute signs of infection.    RADIOLOGY & ADDITIONAL TESTS:

## 2025-01-20 NOTE — PROGRESS NOTE ADULT - ASSESSMENT
69M presents for left foot wound to dermis with cellulitis  - Pt seen and evaluated  - Afebrile, no leukocytosis   - Left foot dorsal midfoot wound to dermis with erythema extending from the base of digits 2-4 to the proximal midfoot, no drainage, no malodor. Mild improvement of erythema with elevation. Right foot no open wounds, no acute signs of infection.  - Left foot X-ray read: no OM, no gas  - Left foot wound cultured  - Recommend ID consult   - Recommend Vasc consult. Patient follows up with Dr. Calle   - Wound dressing daily changed with mupirocin, 4x4 gauze and bill  - Pt is stable for discharge from podiatry standpoint pending final ID recs/ final vasc recs   - Wound care instruction and follow up information in discharge note provider   - Seen with attending

## 2025-01-20 NOTE — PROGRESS NOTE ADULT - ATTENDING COMMENTS
Patient is a 69M with h/o CAD s/p 10 stents, HFrEF (EF~50% as of 11/26/23), A-fib, HTN, HLD, T2DM on insulin, CKD3 here for evaluation and management of dyspnea on exertion and left foot wound, Afebrile, no chest pain, found to have elevated Trops, BNP. Card, podiatry evaluated    1. Acute on chronic systolic HF  2. BARRY on CKD 3  3. L foot infection  4. Elevated Trops with known h/o CAD, s/p PCI  5. Chronic AF on Eliquis  6. T2DM    - Feels ok, breathing improving, no chest pain, fever, VSS, no Tele events  - Reviewed labs, imaging- downtrending Scr 2.1, likely from overdiuresis   - spoke to Renal attending- c/w diuretic at home dose orally- Lasix 40mg bid and Aldactone 50mg/d  - Cardiology f/u plans noted- c/w Metoprolol, ISDN,, daily I/O  - Elevated Trops are due to demand from infection and CHF i/s/o BARRY  - Renal US neg hydro  - Podiatry plans noted- on IV Unasyn   - Vascular Sx consult called- Dr Calle- NIKHIL ordered  - Insulin SS  - PT     spoke to wife at bedside  Time spent 44 min excluding house staff teaching.

## 2025-01-20 NOTE — CONSULT NOTE ADULT - SUBJECTIVE AND OBJECTIVE BOX
Vascular Surgery Consult  Consulting attending: Dr. Calle    HPI:  69M hx of CAD s/p 10 stents, HFrEF (EF~45% as of 11/26/23), A-fib on xarelto, HTN, HLD, T2DM on insulin, CKD3, L CEA, L CFA pseudoaneurysm repair, pw SOB/CLEARY for past few weeks and L foot wound. The patient had a scratch on the sole of his left foot one week ago and which opened up. He went to his pcp and was sent into the ED by his PCP. He denies any fevers or chills but reports increasing redness and warmth of his leg. Vascular surgery consulted iso left foot wound.    He reports chronic peripheral neuropathy. Denies pain in his left foot at this time. Denies claudication or rest pain. Able to ambulate independently.     PAST MEDICAL HISTORY:  Carotid Stenosis    Diabetes Mellitus    Neuropathy    Former smoker    CAD in native artery    Type 2 diabetes mellitus    Hyperlipidemia, unspecified hyperlipidemia type    Essential hypertension, hypertension with unspecified goal    Afib    Hematoma of leg    Stented coronary artery    CHF (congestive heart failure)        PAST SURGICAL HISTORY:  No Past Surgical History    s/p Carotid Endarterectomy        MEDICATIONS:  acetaminophen     Tablet .. 650 milliGRAM(s) Oral every 6 hours PRN  ampicillin/sulbactam  IVPB 3 Gram(s) IV Intermittent every 12 hours  clopidogrel Tablet 75 milliGRAM(s) Oral daily  dextrose 5%. 1000 milliLiter(s) IV Continuous <Continuous>  dextrose 5%. 1000 milliLiter(s) IV Continuous <Continuous>  dextrose 50% Injectable 25 Gram(s) IV Push once  dextrose 50% Injectable 12.5 Gram(s) IV Push once  dextrose 50% Injectable 25 Gram(s) IV Push once  dextrose Oral Gel 15 Gram(s) Oral once  glucagon  Injectable 1 milliGRAM(s) IntraMuscular once  influenza  Vaccine (HIGH DOSE) 0.5 milliLiter(s) IntraMuscular once  insulin glargine Injectable (LANTUS) 10 Unit(s) SubCutaneous at bedtime  insulin lispro (ADMELOG) corrective regimen sliding scale   SubCutaneous three times a day before meals  insulin lispro (ADMELOG) corrective regimen sliding scale   SubCutaneous at bedtime  insulin lispro Injectable (ADMELOG) 11 Unit(s) SubCutaneous before breakfast  insulin lispro Injectable (ADMELOG) 11 Unit(s) SubCutaneous before lunch  insulin lispro Injectable (ADMELOG) 11 Unit(s) SubCutaneous before dinner  isosorbide   dinitrate Tablet (ISORDIL) 10 milliGRAM(s) Oral three times a day  metoprolol succinate ER 50 milliGRAM(s) Oral daily  mupirocin 2% Nasal 1 Application(s) Both Nostrils two times a day  mupirocin 2% Ointment 1 Application(s) Topical <User Schedule>  rivaroxaban 15 milliGRAM(s) Oral with dinner  sodium bicarbonate 650 milliGRAM(s) Oral three times a day  sodium chloride 0.9%. 250 milliLiter(s) IV Continuous <Continuous>  spironolactone 50 milliGRAM(s) Oral daily      ALLERGIES:  atorvastatin (Muscle Pain (Severe))      VITALS & I/Os:  Vital Signs Last 24 Hrs  T(C): 36.3 (20 Jan 2025 10:57), Max: 36.7 (20 Jan 2025 00:40)  T(F): 97.4 (20 Jan 2025 10:57), Max: 98.1 (20 Jan 2025 00:40)  HR: 84 (20 Jan 2025 17:52) (59 - 84)  BP: 149/79 (20 Jan 2025 17:52) (135/63 - 154/67)  BP(mean): --  RR: 18 (20 Jan 2025 10:57) (18 - 18)  SpO2: 92% (20 Jan 2025 10:57) (92% - 99%)    Parameters below as of 20 Jan 2025 10:57  Patient On (Oxygen Delivery Method): room air        I&O's Summary    19 Jan 2025 07:01  -  20 Jan 2025 07:00  --------------------------------------------------------  IN: 360 mL / OUT: 3800 mL / NET: -3440 mL    20 Jan 2025 07:01  -  20 Jan 2025 18:04  --------------------------------------------------------  IN: 480 mL / OUT: 800 mL / NET: -320 mL        PHYSICAL EXAM:  General: Not acutely distressed  Respiratory: Nonlabored respirations  Cardiovascular: Pulse present  Abdominal: No palpable thrill  Extremities: Warm, bl cellulitic changes, left foot dorsal wound with erythema, no discharge. No foot wounds. Motor function and sensation intact  Vascular: DP/PT dopplerable bilaterally, femoral arteries palpable bilaterally    LABS:                        14.3   7.58  )-----------( 215      ( 20 Jan 2025 06:42 )             43.7     01-20    141  |  104  |  52[H]  ----------------------------<  86  3.6   |  21[L]  |  2.21[H]    Ca    9.5      20 Jan 2025 06:42  Phos  4.4     01-20  Mg     2.4     01-20    TPro  5.7[L]  /  Alb  3.7  /  TBili  0.9  /  DBili  x   /  AST  30  /  ALT  28  /  AlkPhos  159[H]  01-20    Lactate:              Urinalysis Basic - ( 20 Jan 2025 06:42 )    Color: x / Appearance: x / SG: x / pH: x  Gluc: 86 mg/dL / Ketone: x  / Bili: x / Urobili: x   Blood: x / Protein: x / Nitrite: x   Leuk Esterase: x / RBC: x / WBC x   Sq Epi: x / Non Sq Epi: x / Bacteria: x        IMAGING:  < from: [facility] Physiol Extremity Lower 3+ Level, BI (01.19.25 @ 20:38) >    ACC: [id number] EXAM:  PHYSIOL EXTREM LOW 3+ LEV BI   ORDERED BY:   MOHSINUZZAMAN KHAN     PROCEDURE DATE:  01/19/2025          INTERPRETATION:  CLINICAL INDICATION: Lower extremity nonhealing   infection, left foot infection    EXAMINATION: Bilateral NIKHIL PVR evaluation    COMPARISON: None    FINDINGS:    Right NIKHIL is 1.15, within normal limits. Left NIKHIL is 0.92, borderline   abnormal.    Bilateral abnormal toe brachial indices, 0.59 on the right and 0.48 on   the left.    Segmental pressure evaluation is limited due to noncompressible   vasculature. Pressure drop identified from left calf to ankle.    PVR waveforms are asymmetrically decreased amplitude at the right ankle   and right metatarsal levels. PVR waveforms are diminished amplitude of   bilateral total levels, worse on the right.    IMPRESSION:    Limited study due to noncompressible vasculature.    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 Proximal disease is not definitively   excluded due to noncompressible vasculature. Correlate with arterial   duplex ultrasound as clinically warranted.    --- End of Report ---            MEGAN GOMEZ M.D., ATTENDING RADIOLOGIST  This document has been electronically signed. Jan 20 2025 12:31PM    < end of copied text >

## 2025-01-20 NOTE — PATIENT PROFILE ADULT - FALL HARM RISK - HARM RISK INTERVENTIONS

## 2025-01-20 NOTE — PROGRESS NOTE ADULT - PROBLEM SELECTOR PLAN 1
~2weeks hx of dyspnea on exertion limiting daily activities   Extensive cardiac hx including cad s/p 10 stents, HFrEF w/ LVEF 50% in 11/2023   [place]BNP elevated [place] [address], appears volume overloaded on exam  Hx consistent w/ worsening CHF vs. cardiac ischemia rather than infectious etiology   Clear lungs on CXR   Negative Flu/Cov/RSV  s/p 80mg IVP in ED     Plan:   -f/u TTE   -Supplemental O2 as needed   -f/u full RVP BCx x2  -Lasix 40mg IV BID for diuresis   -Strict I&O w/ goal net negative 1-2L   -Daily standing weights  -Cardiology consult in AM given extensive cardiac hx and worsening HF

## 2025-01-21 ENCOUNTER — TRANSCRIPTION ENCOUNTER (OUTPATIENT)
Age: 70
End: 2025-01-21

## 2025-01-21 LAB
ALBUMIN SERPL ELPH-MCNC: 3.6 G/DL — SIGNIFICANT CHANGE UP (ref 3.3–5)
ALP SERPL-CCNC: 152 U/L — HIGH (ref 40–120)
ALT FLD-CCNC: 24 U/L — SIGNIFICANT CHANGE UP (ref 10–45)
ANION GAP SERPL CALC-SCNC: 16 MMOL/L — SIGNIFICANT CHANGE UP (ref 5–17)
AST SERPL-CCNC: 24 U/L — SIGNIFICANT CHANGE UP (ref 10–40)
BASOPHILS # BLD AUTO: 0.04 K/UL — SIGNIFICANT CHANGE UP (ref 0–0.2)
BASOPHILS NFR BLD AUTO: 0.4 % — SIGNIFICANT CHANGE UP (ref 0–2)
BILIRUB SERPL-MCNC: 1 MG/DL — SIGNIFICANT CHANGE UP (ref 0.2–1.2)
BUN SERPL-MCNC: 53 MG/DL — HIGH (ref 7–23)
CALCIUM SERPL-MCNC: 9.2 MG/DL — SIGNIFICANT CHANGE UP (ref 8.4–10.5)
CHLORIDE SERPL-SCNC: 100 MMOL/L — SIGNIFICANT CHANGE UP (ref 96–108)
CO2 SERPL-SCNC: 20 MMOL/L — LOW (ref 22–31)
CREAT SERPL-MCNC: 2.05 MG/DL — HIGH (ref 0.5–1.3)
EGFR: 35 ML/MIN/1.73M2 — LOW
EOSINOPHIL # BLD AUTO: 0.19 K/UL — SIGNIFICANT CHANGE UP (ref 0–0.5)
EOSINOPHIL NFR BLD AUTO: 2.1 % — SIGNIFICANT CHANGE UP (ref 0–6)
GLUCOSE BLDC GLUCOMTR-MCNC: 167 MG/DL — HIGH (ref 70–99)
GLUCOSE BLDC GLUCOMTR-MCNC: 187 MG/DL — HIGH (ref 70–99)
GLUCOSE BLDC GLUCOMTR-MCNC: 198 MG/DL — HIGH (ref 70–99)
GLUCOSE BLDC GLUCOMTR-MCNC: 76 MG/DL — SIGNIFICANT CHANGE UP (ref 70–99)
GLUCOSE SERPL-MCNC: 163 MG/DL — HIGH (ref 70–99)
HCT VFR BLD CALC: 42.3 % — SIGNIFICANT CHANGE UP (ref 39–50)
HGB BLD-MCNC: 14 G/DL — SIGNIFICANT CHANGE UP (ref 13–17)
IMM GRANULOCYTES NFR BLD AUTO: 0.5 % — SIGNIFICANT CHANGE UP (ref 0–0.9)
LYMPHOCYTES # BLD AUTO: 1.19 K/UL — SIGNIFICANT CHANGE UP (ref 1–3.3)
LYMPHOCYTES # BLD AUTO: 12.9 % — LOW (ref 13–44)
MAGNESIUM SERPL-MCNC: 2.3 MG/DL — SIGNIFICANT CHANGE UP (ref 1.6–2.6)
MCHC RBC-ENTMCNC: 30.4 PG — SIGNIFICANT CHANGE UP (ref 27–34)
MCHC RBC-ENTMCNC: 33.1 G/DL — SIGNIFICANT CHANGE UP (ref 32–36)
MCV RBC AUTO: 91.8 FL — SIGNIFICANT CHANGE UP (ref 80–100)
MONOCYTES # BLD AUTO: 0.94 K/UL — HIGH (ref 0–0.9)
MONOCYTES NFR BLD AUTO: 10.2 % — SIGNIFICANT CHANGE UP (ref 2–14)
NEUTROPHILS # BLD AUTO: 6.78 K/UL — SIGNIFICANT CHANGE UP (ref 1.8–7.4)
NEUTROPHILS NFR BLD AUTO: 73.9 % — SIGNIFICANT CHANGE UP (ref 43–77)
NRBC # BLD: 0 /100 WBCS — SIGNIFICANT CHANGE UP (ref 0–0)
NRBC BLD-RTO: 0 /100 WBCS — SIGNIFICANT CHANGE UP (ref 0–0)
PHOSPHATE SERPL-MCNC: 4.1 MG/DL — SIGNIFICANT CHANGE UP (ref 2.5–4.5)
PLATELET # BLD AUTO: 227 K/UL — SIGNIFICANT CHANGE UP (ref 150–400)
POTASSIUM SERPL-MCNC: 3.9 MMOL/L — SIGNIFICANT CHANGE UP (ref 3.5–5.3)
POTASSIUM SERPL-SCNC: 3.9 MMOL/L — SIGNIFICANT CHANGE UP (ref 3.5–5.3)
PROT SERPL-MCNC: 6 G/DL — SIGNIFICANT CHANGE UP (ref 6–8.3)
RBC # BLD: 4.61 M/UL — SIGNIFICANT CHANGE UP (ref 4.2–5.8)
RBC # FLD: 13.8 % — SIGNIFICANT CHANGE UP (ref 10.3–14.5)
SODIUM SERPL-SCNC: 136 MMOL/L — SIGNIFICANT CHANGE UP (ref 135–145)
URATE SERPL-MCNC: 7.8 MG/DL — SIGNIFICANT CHANGE UP (ref 3.4–8.8)
WBC # BLD: 9.19 K/UL — SIGNIFICANT CHANGE UP (ref 3.8–10.5)
WBC # FLD AUTO: 9.19 K/UL — SIGNIFICANT CHANGE UP (ref 3.8–10.5)

## 2025-01-21 PROCEDURE — 99233 SBSQ HOSP IP/OBS HIGH 50: CPT | Mod: GC

## 2025-01-21 PROCEDURE — 99232 SBSQ HOSP IP/OBS MODERATE 35: CPT

## 2025-01-21 RX ORDER — MUPIROCIN 2 G/100G
1 CREAM TOPICAL
Qty: 1 | Refills: 0
Start: 2025-01-21 | End: 2025-01-27

## 2025-01-21 RX ORDER — DOXYCYCLINE HYCLATE 100 MG/1
1 CAPSULE ORAL
Qty: 10 | Refills: 0
Start: 2025-01-21 | End: 2025-01-25

## 2025-01-21 RX ORDER — AMOXICILLIN AND CLAVULANATE POTASSIUM 200; 28.5 MG/5ML; MG/5ML
1 POWDER, FOR SUSPENSION ORAL
Qty: 10 | Refills: 0
Start: 2025-01-21 | End: 2025-01-25

## 2025-01-21 RX ADMIN — INSULIN GLARGINE-YFGN 10 UNIT(S): 100 INJECTION, SOLUTION SUBCUTANEOUS at 22:24

## 2025-01-21 RX ADMIN — ISOSORBIDE DINITRATE 10 MILLIGRAM(S): 40 TABLET ORAL at 12:28

## 2025-01-21 RX ADMIN — ISOSORBIDE DINITRATE 10 MILLIGRAM(S): 40 TABLET ORAL at 05:16

## 2025-01-21 RX ADMIN — MUPIROCIN 1 APPLICATION(S): 2 CREAM TOPICAL at 05:15

## 2025-01-21 RX ADMIN — ISOSORBIDE DINITRATE 10 MILLIGRAM(S): 40 TABLET ORAL at 17:20

## 2025-01-21 RX ADMIN — SODIUM BICARBONATE 650 MILLIGRAM(S): 42 INJECTION, SOLUTION INTRAVENOUS at 22:24

## 2025-01-21 RX ADMIN — Medication 1: at 07:54

## 2025-01-21 RX ADMIN — Medication 50 MILLIGRAM(S): at 05:16

## 2025-01-21 RX ADMIN — Medication 1: at 17:21

## 2025-01-21 RX ADMIN — Medication 40 MILLIGRAM(S): at 05:16

## 2025-01-21 RX ADMIN — Medication 11 UNIT(S): at 07:55

## 2025-01-21 RX ADMIN — AMPICILLIN SODIUM AND SULBACTAM SODIUM 200 GRAM(S): 2; 1 INJECTION, POWDER, FOR SOLUTION INTRAMUSCULAR; INTRAVENOUS at 17:22

## 2025-01-21 RX ADMIN — SODIUM BICARBONATE 650 MILLIGRAM(S): 42 INJECTION, SOLUTION INTRAVENOUS at 13:26

## 2025-01-21 RX ADMIN — RIVAROXABAN 15 MILLIGRAM(S): 20 TABLET, FILM COATED ORAL at 17:20

## 2025-01-21 RX ADMIN — MUPIROCIN 1 APPLICATION(S): 2 CREAM TOPICAL at 07:00

## 2025-01-21 RX ADMIN — Medication 40 MILLIGRAM(S): at 13:26

## 2025-01-21 RX ADMIN — Medication 75 MILLIGRAM(S): at 12:29

## 2025-01-21 RX ADMIN — SODIUM BICARBONATE 650 MILLIGRAM(S): 42 INJECTION, SOLUTION INTRAVENOUS at 05:16

## 2025-01-21 RX ADMIN — Medication 11 UNIT(S): at 17:21

## 2025-01-21 RX ADMIN — MUPIROCIN 1 APPLICATION(S): 2 CREAM TOPICAL at 17:22

## 2025-01-21 RX ADMIN — AMPICILLIN SODIUM AND SULBACTAM SODIUM 200 GRAM(S): 2; 1 INJECTION, POWDER, FOR SOLUTION INTRAMUSCULAR; INTRAVENOUS at 05:15

## 2025-01-21 NOTE — PROGRESS NOTE ADULT - ATTENDING COMMENTS
I Tyrone Calle MD have participated in the daily care of this patient  and agree with  the  evaluation, assessment and plan of the surgical house officer

## 2025-01-21 NOTE — PROGRESS NOTE ADULT - SUBJECTIVE AND OBJECTIVE BOX
Patient is a 69y old  Male who presents with a chief complaint of left foot infection (21 Jan 2025 09:48)       INTERVAL HPI/OVERNIGHT EVENTS:  Patient seen and evaluated at bedside.  Pt is resting comfortable in NAD. Denies N/V/F/C.    Allergies    atorvastatin (Muscle Pain (Severe))    Intolerances        Vital Signs Last 24 Hrs  T(C): 36.5 (21 Jan 2025 04:23), Max: 36.6 (20 Jan 2025 20:13)  T(F): 97.7 (21 Jan 2025 04:23), Max: 97.8 (20 Jan 2025 20:13)  HR: 66 (21 Jan 2025 04:23) (66 - 84)  BP: 143/73 (21 Jan 2025 04:23) (143/73 - 153/72)  BP(mean): --  RR: 18 (21 Jan 2025 04:23) (18 - 18)  SpO2: 98% (21 Jan 2025 04:23) (92% - 98%)    Parameters below as of 21 Jan 2025 04:23  Patient On (Oxygen Delivery Method): room air        LABS:                        14.0   9.19  )-----------( 227      ( 21 Jan 2025 06:51 )             42.3     01-21    136  |  100  |  53[H]  ----------------------------<  163[H]  3.9   |  20[L]  |  2.05[H]    Ca    9.2      21 Jan 2025 06:46  Phos  4.1     01-21  Mg     2.3     01-21    TPro  6.0  /  Alb  3.6  /  TBili  1.0  /  DBili  x   /  AST  24  /  ALT  24  /  AlkPhos  152[H]  01-21      Urinalysis Basic - ( 21 Jan 2025 06:46 )    Color: x / Appearance: x / SG: x / pH: x  Gluc: 163 mg/dL / Ketone: x  / Bili: x / Urobili: x   Blood: x / Protein: x / Nitrite: x   Leuk Esterase: x / RBC: x / WBC x   Sq Epi: x / Non Sq Epi: x / Bacteria: x      CAPILLARY BLOOD GLUCOSE      POCT Blood Glucose.: 167 mg/dL (21 Jan 2025 07:37)  POCT Blood Glucose.: 166 mg/dL (20 Jan 2025 21:48)  POCT Blood Glucose.: 164 mg/dL (20 Jan 2025 17:01)  POCT Blood Glucose.: 210 mg/dL (20 Jan 2025 11:38)      Lower Extremity Physical Exam:  Vascular: DP/PT 0/4, B/L, CFT <3 seconds B/L, Temperature gradient warm to cool, B/L.   Neuro: Epicritic sensation diminished to the level of digits, B/L.  Musculoskeletal/Ortho: unremarkable  Skin: Left foot dorsal midfoot wound to dermis with erythema extending from the base of digits 2-4 to the proximal midfoot, no drainage, no malodor. Mild improvement of erythema with elevation. Right foot no open wounds, no acute signs of infection.    RADIOLOGY & ADDITIONAL TESTS:

## 2025-01-21 NOTE — PROGRESS NOTE ADULT - PROBLEM SELECTOR PLAN 2
1 week hx of L foot wound, seen by Podiatry in ED   L foot wound to dermis w/ cellulitis  Hx of significant PAD   XR foot ankle without signs of OM or gas, ESR CRP WBC WNL and afebrile   Lactate elevated to 2.8   s/p Zosyn and Vancomycin in ED     Plan:   C/w Unasyn  DC Vancomycin, as wound not purulent and less likely MRSA   -Wound dressing w/ mupirocin daily  -f/u MRSA swab  -f/u lactate   -clear for DC from podiatry standpoint w/ Augmentin, but will extend IV therapy while admitted inpatient  -Follow up with Dr Gibson within 1 week of discharge  -f/u w/ outpatient vascular surgeon for PAD 1 week hx of L foot wound, seen by Podiatry in ED   L foot wound to dermis w/ cellulitis  Hx of significant PAD   XR foot ankle without signs of OM or gas, ESR CRP WBC WNL and afebrile   Lactate elevated to 2.8  Vascular no intervention  s/p Zosyn and Vancomycin in ED     Plan:   s/p Unasyn  f/u wound culture  will DC pt with augmenting 875 BID and doxycycline 100mg BID for 5 days  -Follow up with Dr Gibson within 1 week of discharge

## 2025-01-21 NOTE — PROGRESS NOTE ADULT - SUBJECTIVE AND OBJECTIVE BOX
Vascular Surgery Daily Progress Note  =====================================================    SUBJECTIVE: NAEO    --------------------------------------------------------------------------------------  VITAL SIGNS:  T(C): 36.6 (01-21-25 @ 12:35), Max: 36.6 (01-20-25 @ 20:13)  HR: 72 (01-21-25 @ 12:35) (66 - 84)  BP: 139/73 (01-21-25 @ 12:35) (139/73 - 153/72)  RR: 18 (01-21-25 @ 12:35) (18 - 18)  SpO2: 99% (01-21-25 @ 12:35) (98% - 99%)  --------------------------------------------------------------------------------------    EXAM    General: NAD, resting in bed comfortably  Cardiac: Regular rate, normotensive  Respiratory: Nonlabored respirations, normal cw expansion, on RA  Neuro: AOx3, CNII-XII intact on gross examination  Vascular/Extremities: Warm, well perfused, BL LE erythema, LLE foot dorsal wound with erythema, no discharge    --------------------------------------------------------------------------------------    IMAGING:   VA Duplex BL LE (1/19/25):  FINDINGS:    RIGHT:  Normal compressibility of the RIGHT common femoral, femoral and popliteal   veins.  Doppler examination shows normal spontaneous and phasic flow.  No RIGHT calf vein thrombosis is detected.    LEFT:  Normal compressibility of the LEFT common femoral, femoral and popliteal   veins.  Doppler examination shows normal spontaneous and phasic flow.  No LEFT calf vein thrombosis is detected.    IMPRESSION:  No evidence of deep venous thrombosis in either lower extremity.   Vascular Surgery Daily Progress Note  =====================================================    Pt w/o new c/o      --------------------------------------------------------------------------------------  VITAL SIGNS:  T(C): 36.6 (01-21-25 @ 12:35), Max: 36.6 (01-20-25 @ 20:13)  HR: 72 (01-21-25 @ 12:35) (66 - 84)  BP: 139/73 (01-21-25 @ 12:35) (139/73 - 153/72)  RR: 18 (01-21-25 @ 12:35) (18 - 18)  SpO2: 99% (01-21-25 @ 12:35) (98% - 99%)  --------------------------------------------------------------------------------------    EXAM    General: NAD, resting in bed comfortably  Cardiac: Regular rate, normotensive  Respiratory: Nonlabored respirations, normal cw expansion, on RA  Neuro: AOx3, CNII-XII intact on gross examination  Vascular/Extremities: Warm, well perfused, BL LE erythema, LLE foot dorsal wound with erythema, no discharge  dressing c/d/i    --------------------------------------------------------------------------------------    IMAGING:   VA Duplex BL LE (1/19/25):  FINDINGS:    RIGHT:  Normal compressibility of the RIGHT common femoral, femoral and popliteal   veins.  Doppler examination shows normal spontaneous and phasic flow.  No RIGHT calf vein thrombosis is detected.    LEFT:  Normal compressibility of the LEFT common femoral, femoral and popliteal   veins.  Doppler examination shows normal spontaneous and phasic flow.  No LEFT calf vein thrombosis is detected.    IMPRESSION:  No evidence of deep venous thrombosis in either lower extremity.

## 2025-01-21 NOTE — PROGRESS NOTE ADULT - PROBLEM SELECTOR PLAN 1
~2weeks hx of dyspnea on exertion limiting daily activities   Extensive cardiac hx including cad s/p 10 stents, HFrEF w/ LVEF 50% in 11/2023   ProBNP elevated to [address], appears volume overloaded on exam  Hx consistent w/ worsening CHF vs. cardiac ischemia rather than infectious etiology   Clear lungs on CXR   Negative Flu/Cov/RSV  s/p 80mg IVP in ED     Plan:   -f/u TTE   -Supplemental O2 as needed   -f/u full RVP BCx x2  -Lasix 40mg IV BID for diuresis   -Strict I&O w/ goal net negative 1-2L   -Daily standing weights  -Cardiology consult in AM given extensive cardiac hx and worsening HF

## 2025-01-21 NOTE — PROGRESS NOTE ADULT - SUBJECTIVE AND OBJECTIVE BOX
Overnight events noted      VITAL:  T(C): , Max: 36.6 (01-20-25 @ 20:13)  T(F): , Max: 97.8 (01-20-25 @ 20:13)  HR: 66 (01-21-25 @ 04:23)  BP: 143/73 (01-21-25 @ 04:23)  BP(mean): --  RR: 18 (01-21-25 @ 04:23)  SpO2: 98% (01-21-25 @ 04:23)  Wt(kg): --      PHYSICAL EXAM:  Constitutional: NAD, Alert  HEENT: NCAT, MMM  Neck: Supple, No JVD  Respiratory: CTA-b/l  Cardiovascular: reg s1s2  Gastrointestinal: BS+, soft, NT/ND  Extremities: No peripheral edema b/l  Neurological: no focal deficits; strength grossly intact  Back: no CVAT b/l  Skin: No rashes, no nevi      LABS:                        14.0   9.19  )-----------( 227      ( 21 Jan 2025 06:51 )             42.3     Na(136)/K(3.9)/Cl(100)/HCO3(20)/BUN(53)/Cr(2.05)Glu(163)/Ca(9.2)/Mg(2.3)/PO4(4.1)    01-21 @ 06:46  Na(141)/K(3.6)/Cl(104)/HCO3(21)/BUN(52)/Cr(2.21)Glu(86)/Ca(9.5)/Mg(2.4)/PO4(4.4)    01-20 @ 06:42  Na(139)/K(3.9)/Cl(100)/HCO3(22)/BUN(54)/Cr(2.39)Glu(164)/Ca(9.1)/Mg(2.3)/PO4(4.5)    01-19 @ 06:11    Sodium, Random Urine: 67 mmol/L (01-19 @ 19:15)  Creatinine, Random Urine: 46 mg/dL (01-19 @ 19:15)  Protein/Creatinine Ratio Calculation: 0.3 Ratio (01-19 @ 19:15)      IMAGING:  < from: TTE W or WO Ultrasound Enhancing Agent (01.20.25 @ 15:23) >   1. Left ventricular cavity is small. Left ventricular wall thickness is normal. Left ventricular systolic function is mildly to moderately decreased with an ejection fraction visually estimated at 40 to 45 %. Regional wall motion abnormalities present.   2. Multiple segmental abnormalities exist. See findings.   3. Unable to assess left ventricular diastolic function due to insufficient data.   4. Mild to moderate mitral regurgitation at a blood pressure of 150/85 mmHg.   5. Mild aortic regurgitation.   6. Normal right ventricular cavity size, with normal wall thickness, and borderline reduced right ventricular systolic function.   7. Estimated pulmonary artery systolic pressure is 49 mmHg, consistent with mild pulmonary hypertension.   8. No pericardial effusion seen.   9. Compared to the transthoracic echocardiogram performed on 6/24/2024, there have been no significant interval changes.        IMPRESSION: 69M w/ HTN, DM2, AFib, CAD-10 stents, HFrEF, and CKD, 1/18/25 p/w L foot wound and acute on chronic HFrEF    (1)CKD - 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 At/near baseline  (2)Metabolic acidosis - controlled, on PO NaHCO3  (3)Hypokalemia - controlled, on Spironolactone  (4)ID - L foot wound infection - on IV Unasyn  (5)CV - volume seems reasonable on high-dose PO diuretics    RECOMMEND:  (1)Lasix, Spironolactone, and NaHCO3 as ordered  (2)Continue antibiotics for GFR 20-30ml/min        Markell Walton MD  [facility]  Office/on call physician: [phone]  Cell (7a-7p): [phone]       No pain, no sob      VITAL:  T(C): , Max: 36.6 (01-20-25 @ 20:13)  T(F): , Max: 97.8 (01-20-25 @ 20:13)  HR: 66 (01-21-25 @ 04:23)  BP: 143/73 (01-21-25 @ 04:23)  BP(mean): --  RR: 18 (01-21-25 @ 04:23)  SpO2: 98% (01-21-25 @ 04:23)  Wt(kg): --      PHYSICAL EXAM:  Constitutional: NAD, Alert  HEENT: NCAT, MMM  Neck: Supple, No JVD  Respiratory: CTA-b/l  Cardiovascular: reg s1s2  Gastrointestinal: BS+, soft, NT/ND  Extremities: No peripheral edema b/l  Neurological: no focal deficits; strength grossly intact  Back: no CVAT b/l  Skin: No rashes, no nevi      LABS:                        14.0   9.19  )-----------( 227      ( 21 Jan 2025 06:51 )             42.3     Na(136)/K(3.9)/Cl(100)/HCO3(20)/BUN(53)/Cr(2.05)Glu(163)/Ca(9.2)/Mg(2.3)/PO4(4.1)    01-21 @ 06:46  Na(141)/K(3.6)/Cl(104)/HCO3(21)/BUN(52)/Cr(2.21)Glu(86)/Ca(9.5)/Mg(2.4)/PO4(4.4)    01-20 @ 06:42  Na(139)/K(3.9)/Cl(100)/HCO3(22)/BUN(54)/Cr(2.39)Glu(164)/Ca(9.1)/Mg(2.3)/PO4(4.5)    01-19 @ 06:11    Sodium, Random Urine: 67 mmol/L (01-19 @ 19:15)  Creatinine, Random Urine: 46 mg/dL (01-19 @ 19:15)  Protein/Creatinine Ratio Calculation: 0.3 Ratio (01-19 @ 19:15)      IMAGING:  < from: TTE W or WO Ultrasound Enhancing Agent (01.20.25 @ 15:23) >   1. Left ventricular cavity is small. Left ventricular wall thickness is normal. Left ventricular systolic function is mildly to moderately decreased with an ejection fraction visually estimated at 40 to 45 %. Regional wall motion abnormalities present.   2. Multiple segmental abnormalities exist. See findings.   3. Unable to assess left ventricular diastolic function due to insufficient data.   4. Mild to moderate mitral regurgitation at a blood pressure of 150/85 mmHg.   5. Mild aortic regurgitation.   6. Normal right ventricular cavity size, with normal wall thickness, and borderline reduced right ventricular systolic function.   7. Estimated pulmonary artery systolic pressure is 49 mmHg, consistent with mild pulmonary hypertension.   8. No pericardial effusion seen.   9. Compared to the transthoracic echocardiogram performed on 6/24/2024, there have been no significant interval changes.        IMPRESSION: 69M w/ HTN, DM2, AFib, CAD-10 stents, HFrEF, and CKD, 1/18/25 p/w L foot wound and acute on chronic HFrEF    (1)CKD - 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 At/near baseline  (2)Metabolic acidosis - controlled, on PO NaHCO3  (3)Hypokalemia - controlled, on Spironolactone  (4)ID - L foot wound infection - on IV Unasyn  (5)CV - volume seems reasonable on high-dose PO diuretics    RECOMMEND:  (1)Lasix, Spironolactone, and NaHCO3 as ordered  (2)Continue antibiotics for GFR 20-30ml/min        Markell Walton MD  [facility]  Office/on call physician: [phone]  Cell (7a-7p): [phone]

## 2025-01-21 NOTE — DISCHARGE NOTE NURSING/CASE MANAGEMENT/SOCIAL WORK - PATIENT PORTAL LINK FT
You can access the FollowMyHealth Patient Portal offered by [facility] by registering at the following website: [email or web address]. By joining "[employer]’s FollowMyHealth portal, you will also be able to view your health information using other applications (apps) compatible with our system.

## 2025-01-21 NOTE — DISCHARGE NOTE NURSING/CASE MANAGEMENT/SOCIAL WORK - FINANCIAL ASSISTANCE
Health system provides services at a reduced cost to those who are determined to be eligible through Health system’s financial assistance program. Information regarding Health system’s financial assistance program can be found by going to [email or web address] or by calling [phone].

## 2025-01-21 NOTE — PROGRESS NOTE ADULT - ATTENDING COMMENTS
-Pods recs monitoring inpatient for final culture results. -C/w unasyn for now for left foot cellulitis. Hasn't receded much from line marked on foot, so consider adding MRSA coverage given MRSA nares positive.   -F/u vascular - appears to have microvascular disease on NIKHIL.   -Echo stable since last in June 2024. F/u cards recs.  -DCP soon.   -Plan discussed with house staff.

## 2025-01-21 NOTE — DISCHARGE NOTE NURSING/CASE MANAGEMENT/SOCIAL WORK - NSDCPEXARELTOFU_GEN_ALL_CORE
Refill requested for:       Omeprazole 40 mg   Lisinopril 40 mg    Rx sent 3/17/18 for #30 +1    Confirmed with pharmacy that refills are available.
VIA FAX    Per Pharmacy fax there are no changes in medication, dosage, sig or quantity. The medication(s) are set up as pending and waiting for your approval. The preferred pharmacy has been set up and verified.
Go for blood tests as directed. Your doctor will do lab tests at regular visits to monitor the effects of this medicine. Please follow up with your doctor and keep your health care provider appointments.

## 2025-01-21 NOTE — PROGRESS NOTE ADULT - ASSESSMENT
69M presents for left foot wound to dermis with cellulitis  - Pt seen and evaluated  - Afebrile, no leukocytosis   - Left foot dorsal midfoot wound to dermis with erythema extending from the base of digits 2-4 to the proximal midfoot, no drainage, no malodor. Mild improvement of erythema with elevation. Right foot no open wounds, no acute signs of infection.  - Left foot X-ray read: no OM, no gas  - Left foot wound culture pending   - Recommend ID consult   - Vasc recs, appreciated   - Wound dressing daily changed with mupirocin, 4x4 gauze and bill  - Pt is stable for discharge from podiatry standpoint pending final ID recs/ appearance   - Wound care instruction and follow up information in discharge note provider   - Discussed with attending

## 2025-01-21 NOTE — PROGRESS NOTE ADULT - ASSESSMENT
#h/o ashd s/p multiple pci, last pci dLAD 4/17/24 by Dr ILDA Dominguez  #HTN  #DM (Dr Reyes)  #jesika on ckd (Dr Christophe Walton), renal follow up noted, remains on diuretics,  Cr trending down, no dyspnea.  #PAD, s/p cea by Dr Calle  # hfref ef last 47%  #AF on xarelto  #elevated troponin likely demand ischemia, accuracy diminished setting renal dysfunction  #Left foot infection managed by vascular and podiatry.  Stable at present cv perspective.

## 2025-01-21 NOTE — PROGRESS NOTE ADULT - SUBJECTIVE AND OBJECTIVE BOX
PROGRESS NOTE:     Patient is a 69y old  Male who presents with a chief complaint of left foot infection (21 Jan 2025 10:53)      INTERVAL EVENTS: No acute overnight events.     SUBJECTIVE: Patient seen and examined at bedside. This morning, the patient is comfortable and doing well. No acute complaints.    MEDICATIONS  (STANDING):  ampicillin/sulbactam  IVPB 3 Gram(s) IV Intermittent every 12 hours  clopidogrel Tablet 75 milliGRAM(s) Oral daily  dextrose 5%. 1000 milliLiter(s) (50 mL/Hr) IV Continuous <Continuous>  dextrose 5%. 1000 milliLiter(s) (100 mL/Hr) IV Continuous <Continuous>  dextrose 50% Injectable 25 Gram(s) IV Push once  dextrose 50% Injectable 12.5 Gram(s) IV Push once  dextrose 50% Injectable 25 Gram(s) IV Push once  dextrose Oral Gel 15 Gram(s) Oral once  furosemide    Tablet 40 milliGRAM(s) Oral two times a day  glucagon  Injectable 1 milliGRAM(s) IntraMuscular once  influenza  Vaccine (HIGH DOSE) 0.5 milliLiter(s) IntraMuscular once  insulin glargine Injectable (LANTUS) 10 Unit(s) SubCutaneous at bedtime  insulin lispro (ADMELOG) corrective regimen sliding scale   SubCutaneous three times a day before meals  insulin lispro (ADMELOG) corrective regimen sliding scale   SubCutaneous at bedtime  insulin lispro Injectable (ADMELOG) 11 Unit(s) SubCutaneous before breakfast  insulin lispro Injectable (ADMELOG) 11 Unit(s) SubCutaneous before lunch  insulin lispro Injectable (ADMELOG) 11 Unit(s) SubCutaneous before dinner  isosorbide   dinitrate Tablet (ISORDIL) 10 milliGRAM(s) Oral three times a day  metoprolol succinate ER 50 milliGRAM(s) Oral daily  mupirocin 2% Nasal 1 Application(s) Both Nostrils two times a day  mupirocin 2% Ointment 1 Application(s) Topical <User Schedule>  rivaroxaban 15 milliGRAM(s) Oral with dinner  sodium bicarbonate 650 milliGRAM(s) Oral three times a day  sodium chloride 0.9%. 250 milliLiter(s) (50 mL/Hr) IV Continuous <Continuous>  spironolactone 50 milliGRAM(s) Oral daily    MEDICATIONS  (PRN):  acetaminophen     Tablet .. 650 milliGRAM(s) Oral every 6 hours PRN Temp greater or equal to 38C (100.4F), Mild Pain (1 - 3)      CAPILLARY BLOOD GLUCOSE      POCT Blood Glucose.: 167 mg/dL (21 Jan 2025 07:37)  POCT Blood Glucose.: 166 mg/dL (20 Jan 2025 21:48)  POCT Blood Glucose.: 164 mg/dL (20 Jan 2025 17:01)  POCT Blood Glucose.: 210 mg/dL (20 Jan 2025 11:38)    I&O's Summary    20 Jan 2025 07:01  -  21 Jan 2025 07:00  --------------------------------------------------------  IN: 720 mL / OUT: 2625 mL / NET: -1905 mL    21 Jan 2025 07:01  -  21 Jan 2025 11:16  --------------------------------------------------------  IN: 240 mL / OUT: 400 mL / NET: -160 mL        PHYSICAL EXAM:  Vital Signs Last 24 Hrs  T(C): 36.5 (21 Jan 2025 04:23), Max: 36.6 (20 Jan 2025 20:13)  T(F): 97.7 (21 Jan 2025 04:23), Max: 97.8 (20 Jan 2025 20:13)  HR: 66 (21 Jan 2025 04:23) (66 - 84)  BP: 143/73 (21 Jan 2025 04:23) (143/73 - 153/72)  BP(mean): --  RR: 18 (21 Jan 2025 04:23) (18 - 18)  SpO2: 98% (21 Jan 2025 04:23) (98% - 98%)    Parameters below as of 21 Jan 2025 04:23  Patient On (Oxygen Delivery Method): room air        GENERAL: NAD, lying in bed comfortably  HEAD: Atraumatic, normocephalic  EYES: EOMI, PERRLA, conjunctiva and sclera clear  ENT: Moist mucous membranes  NECK: Supple, no JVD  HEART: S1, S2, Regular rate and rhythm, no murmurs, rubs, or gallops  LUNGS: Unlabored respirations, clear to auscultation bilaterally, no crackles, wheezing, or rhonchi  ABDOMEN: Soft, nontender, nondistended, +BS  EXTREMITIES: 2+ peripheral pulses bilaterally. No clubbing, cyanosis, or edema  NERVOUS SYSTEM:  A&Ox3, no focal deficits   SKIN: No rashes or lesions    LABS:                        14.0   9.19  )-----------( 227      ( 21 Jan 2025 06:51 )             42.3     01-21    136  |  100  |  53[H]  ----------------------------<  163[H]  3.9   |  20[L]  |  2.05[H]    Ca    9.2      21 Jan 2025 06:46  Phos  4.1     01-21  Mg     2.3     01-21    TPro  6.0  /  Alb  3.6  /  TBili  1.0  /  DBili  x   /  AST  24  /  ALT  24  /  AlkPhos  152[H]  01-21          Urinalysis Basic - ( 21 Jan 2025 06:46 )    Color: x / Appearance: x / SG: x / pH: x  Gluc: 163 mg/dL / Ketone: x  / Bili: x / Urobili: x   Blood: x / Protein: x / Nitrite: x   Leuk Esterase: x / RBC: x / WBC x   Sq Epi: x / Non Sq Epi: x / Bacteria: x        Culture - Abscess with Gram Stain (collected 20 Jan 2025 14:38)  Source: .Abscess  Gram Stain (20 Jan 2025 21:44):    No polymorphonuclear leukocytes per low power field    No organisms seen per oil power field        RADIOLOGY & ADDITIONAL TESTS:  Results Reviewed:   Imaging Personally Reviewed:  Electrocardiogram Personally Reviewed:  Tele:

## 2025-01-21 NOTE — DISCHARGE NOTE NURSING/CASE MANAGEMENT/SOCIAL WORK - FLU SEASON?
Subjective:   Patient ID: Leonie Wood is a 54 year old female.    HPI  Ms. Wood is a pleasant 54-year-old female with history of chronic pain syndrome secondary to lumbar degenerative joint disease, status post right knee arthroscopy for torn meniscus and history of hives here for follow-up appointment for chronic pain management.  She has been doing well with the current pain regimen.  She will also need refills for this and her lorazepam for anxiety.  She also would need refills for EpiPen.  Recently she was scratched by her pet cat and who has been applying mupirocin on her chin. I had reviewed past medical and family histories together with allergy and medication lists documented.    History/Other:   Review of Systems  Constitutional: Negative for fatigue and fever.  HENT: Negative for sore throat and trouble swallowing.    Respiratory: Negative for cough.    Gastrointestinal: Negative for abdominal pain, diarrhea, nausea and vomiting.  Musculoskeletal: Positive for arthralgias, back pain  Current Outpatient Medications   Medication Sig Dispense Refill    oxyCODONE-acetaminophen  MG Oral Tab Take 1-2 tablets by mouth every 8 (eight) hours as needed for Pain. 90 tablet 0    [START ON 2/27/2024] oxyCODONE-acetaminophen  MG Oral Tab Take 1-2 tablets by mouth every 8 (eight) hours as needed for Pain. 90 tablet 0    [START ON 3/26/2024] oxyCODONE-acetaminophen  MG Oral Tab Take 1-2 tablets by mouth every 8 (eight) hours as needed for Pain. 90 tablet 0    LORazepam 1 MG Oral Tab Take 1 tablet (1 mg total) by mouth 2 (two) times daily as needed for Anxiety. 30 tablet 0    EPINEPHrine 0.3 MG/0.3ML Injection Solution Auto-injector Inject 0.3 mL (1 each total) as directed one time for 1 dose. 1 each 0    traMADol 50 MG Oral Tab Take 1-2 tablets ( mg total) by mouth every 4 to 6 hours as needed for Pain. 240 tablet 0    predniSONE 5 MG Oral Tab Take 1 tablet (5 mg total) by mouth every  morning. 30 tablet 0    oxyCODONE-acetaminophen  MG Oral Tab Take 1 tablet by mouth every 8 (eight) hours as needed for Pain. 90 tablet 0    predniSONE 1 MG Oral Tab TAKE 4 TABLETS BY MOUTH DAILY FOR 7 DAYS, THEN 3 TABLETS DAILY FOR 7 DAYS, THEN 2 TABLETS DAILY FOR 14 DAYS. 90 tablet 0    mupirocin 2 % External Ointment Apply 1 Application topically 3 (three) times daily. 15 g 0    predniSONE 10 MG Oral Tab TAKE 1 TABLET (10 MG TOTAL) BY MOUTH 2 (TWO) TIMES DAILY FOR 3 DAYS, THEN 1 TABLET (10 MG TOTAL) DAILY FOR 3 DAYS, THEN 1/2 TABLET (5 MG TOTAL) DAILY FOR 3 DAYS. 11 tablet 0    rosuvastatin 10 MG Oral Tab Take 1 tablet (10 mg total) by mouth nightly. 90 tablet 0    CYCLOBENZAPRINE 10 MG Oral Tab TAKE 1 TABLET BY MOUTH NIGHTLY AS NEEDED FOR MUSCLE SPASMS 30 tablet 5    Ibuprofen 100 MG Oral Chew Tab       predniSONE 50 MG Oral Tab Take 1 tablet (50 mg total) by mouth as needed. For allergy flare-up      Albuterol Sulfate  (90 Base) MCG/ACT Inhalation Aero Soln Inhale 2 puffs into the lungs every 6 (six) hours as needed for Wheezing. 3 each 1    aspirin  MG Oral Tab EC TAKE ONE TABLET BY MOUTH ONE TIME DAILY POST OPERATIVELY      Fexofenadine HCl 180 MG Oral Tab Take 1 tablet (180 mg total) by mouth daily.      Naloxone HCl 4 MG/0.1ML Nasal Liquid 4 mg by Nasal route as needed. If patient remains unresponsive, repeat dose in other nostril 2-5 minutes after first dose. 1 kit 0    famoTIDine 20 MG Oral Tab Take 1 tablet (20 mg total) by mouth 2 (two) times daily.      psyllium 28 % Oral Powd Pack Take 1 packet by mouth as needed.      cetirizine 10 MG Oral Tab Take 1-2 tablets (10-20 mg total) by mouth daily.      caffeine 200 MG Oral Tab Take 1 tablet (200 mg total) by mouth daily.      metoprolol succinate ER 50 MG Oral Tablet 24 Hr Take 1 tablet (50 mg total) by mouth daily. (Patient not taking: Reported on 1/31/2024) 90 tablet 1    metoprolol succinate ER 25 MG Oral Tablet 24 Hr Take 1 tablet  (25 mg total) by mouth daily. (Patient not taking: Reported on 1/31/2024) 30 tablet 1    Ketotifen Fumarate (KETOTIFEN HYDROGEN FUMARATE) Does not apply Powder 2 mg.       Allergies:  Allergies   Allergen Reactions    Hydrochlorothiazide SWELLING    Latex ANAPHYLAXIS    Latex ANAPHYLAXIS and RASH    Morphine SWELLING     Legs swelling      Phentermine HIVES and SWELLING    Penicillin G RASH       Objective:   Physical Exam  Vitals reviewed.   Constitutional:       General: She is not in acute distress.  HENT:      Mouth/Throat:      Mouth: Mucous membranes are moist.      Pharynx: Oropharynx is clear.   Eyes:      General: No scleral icterus.     Conjunctiva/sclera: Conjunctivae normal.   Cardiovascular:      Rate and Rhythm: Normal rate and regular rhythm.      Heart sounds: Normal heart sounds. No murmur heard.  Pulmonary:      Effort: Pulmonary effort is normal. No respiratory distress.      Breath sounds: Normal breath sounds. No wheezing or rales.   Abdominal:      General: Bowel sounds are normal.      Palpations: Abdomen is soft.   Musculoskeletal:      Cervical back: Neck supple.       Lymphadenopathy:      Cervical: No cervical adenopathy.   Skin:     General: Skin is warm.   Small circular abrasion noted on the left submental area with no erythema no warmth no tenderness no discharge.  Neurological:      Mental Status: She is alert.   Assessment & Plan:   1. Osteoarthritis of lumbar spine, unspecified spinal osteoarthritis complication status   chronic stable issue, continue present management with observation and medications as noted     2. Chronic pain syndrome   chronic stable issue, continue present management with observation and medications as noted     3. Anxiety   chronic stable issue, continue present management with observation and medications as noted     4. Hives   chronic stable issue, continue present management with observation and medications as noted     Continue mupirocin.  Continue with  current medication regimen which was refilled and sent to her corresponding pharmacies.  Follow-up after 3 months or as needed    This note was prepared using [employer] voice recognition dictation software. As a result errors may occur. When identified these errors have been corrected. While every attempt is made to correct errors during dictation discrepancies may still exist            No orders of the defined types were placed in this encounter.      Meds This Visit:  Requested Prescriptions     Signed Prescriptions Disp Refills    oxyCODONE-acetaminophen  MG Oral Tab 90 tablet 0     Sig: Take 1-2 tablets by mouth every 8 (eight) hours as needed for Pain.    oxyCODONE-acetaminophen  MG Oral Tab 90 tablet 0     Sig: Take 1-2 tablets by mouth every 8 (eight) hours as needed for Pain.    oxyCODONE-acetaminophen  MG Oral Tab 90 tablet 0     Sig: Take 1-2 tablets by mouth every 8 (eight) hours as needed for Pain.    LORazepam 1 MG Oral Tab 30 tablet 0     Sig: Take 1 tablet (1 mg total) by mouth 2 (two) times daily as needed for Anxiety.    EPINEPHrine 0.3 MG/0.3ML Injection Solution Auto-injector 1 each 0     Sig: Inject 0.3 mL (1 each total) as directed one time for 1 dose.       Imaging & Referrals:  None     Yes...

## 2025-01-21 NOTE — PROGRESS NOTE ADULT - ASSESSMENT
69M PMH DMII, CAD s/p stent, HLD, HTN who presented with left foot wound.     Recommendations:  -No acute vascular surgery intervention at this time.  -NIKHIL/PVR reviewed.  -Abx.  -Continue with local wound care.      Vascular Surgery  [id number]   69M PMH DMII, CAD s/p stent, HLD, HTN who presented with left foot wound.     Recommendations:  -will d/w pt lle angio possible intervention   -Continue with local wound care.      Vascular Surgery  [id number]

## 2025-01-21 NOTE — PROGRESS NOTE ADULT - SUBJECTIVE AND OBJECTIVE BOX
SUBJECTIVE: Asymptomatic in bed. Denies chest pain or dyspnea. Renal follow up noted.   	  MEDICATIONS:  furosemide    Tablet 40 milliGRAM(s) Oral two times a day  isosorbide   dinitrate Tablet (ISORDIL) 10 milliGRAM(s) Oral three times a day  metoprolol succinate ER 50 milliGRAM(s) Oral daily  spironolactone 50 milliGRAM(s) Oral daily  ampicillin/sulbactam  IVPB 3 Gram(s) IV Intermittent every 12 hours  acetaminophen     Tablet .. 650 milliGRAM(s) Oral every 6 hours PRN  dextrose 50% Injectable 25 Gram(s) IV Push once  dextrose 50% Injectable 12.5 Gram(s) IV Push once  dextrose 50% Injectable 25 Gram(s) IV Push once  dextrose Oral Gel 15 Gram(s) Oral once  glucagon  Injectable 1 milliGRAM(s) IntraMuscular once  insulin glargine Injectable (LANTUS) 10 Unit(s) SubCutaneous at bedtime  insulin lispro (ADMELOG) corrective regimen sliding scale   SubCutaneous three times a day before meals  insulin lispro (ADMELOG) corrective regimen sliding scale   SubCutaneous at bedtime  insulin lispro Injectable (ADMELOG) 11 Unit(s) SubCutaneous before breakfast  insulin lispro Injectable (ADMELOG) 11 Unit(s) SubCutaneous before lunch  insulin lispro Injectable (ADMELOG) 11 Unit(s) SubCutaneous before dinner  clopidogrel Tablet 75 milliGRAM(s) Oral daily  dextrose 5%. 1000 milliLiter(s) IV Continuous <Continuous>  dextrose 5%. 1000 milliLiter(s) IV Continuous <Continuous>  influenza  Vaccine (HIGH DOSE) 0.5 milliLiter(s) IntraMuscular once  mupirocin 2% Nasal 1 Application(s) Both Nostrils two times a day  mupirocin 2% Ointment 1 Application(s) Topical <User Schedule>  rivaroxaban 15 milliGRAM(s) Oral with dinner  sodium bicarbonate 650 milliGRAM(s) Oral three times a day  sodium chloride 0.9%. 250 milliLiter(s) IV Continuous <Continuous>      REVIEW OF SYSTEMS:    CONSTITUTIONAL: No fever, weight loss, or fatigue  EYES: No eye pain, visual disturbances, or discharge  NECK: No pain or stiffness  RESPIRATORY: No cough, wheezing, chills or hemoptysis; No Shortness of Breath  CARDIOVASCULAR: No chest pain, palpitations, dizziness, or leg swelling  GASTROINTESTINAL: No abdominal or epigastric pain. No nausea, vomiting, or hematemesis; No diarrhea or constipation. No melena or hematochezia.  GENITOURINARY: No dysuria, frequency, hematuria, or incontinence  NEUROLOGICAL: No headaches, memory loss, loss of strength, numbness, or tremors  SKIN: No itching, burning, rashes, or lesions   LYMPH Nodes: No enlarged glands  MUSCULOSKELETAL: No joint pain or swelling; No muscle, back, or extremity pain  All other review of systems are negative.  	    PHYSICAL EXAM:  T(C): 36.5 (01-21-25 @ 04:23), Max: 36.6 (01-20-25 @ 20:13)  HR: 66 (01-21-25 @ 04:23) (66 - 84)  BP: 143/73 (01-21-25 @ 04:23) (143/73 - 153/72)  RR: 18 (01-21-25 @ 04:23) (18 - 18)  SpO2: 98% (01-21-25 @ 04:23) (98% - 98%)  Wt(kg): --  I&O's Summary    20 Jan 2025 07:01  -  21 Jan 2025 07:00  --------------------------------------------------------  IN: 720 mL / OUT: 2625 mL / NET: -1905 mL    21 Jan 2025 07:01  -  21 Jan 2025 11:02  --------------------------------------------------------  IN: 240 mL / OUT: 400 mL / NET: -160 mL          PHYSICAL EXAM    Appearance: Normal	  HEENT:   Normal oral mucosa, PERRL, EOMI	  NECK: Soft and supple, No LAD, No JVD  Cardiovascular: irregular normal s1s2  Respiratory: Lungs clear to auscultation	  Extremities: dorsum of left foot erythematous    LABS:	 	               14.0   9.19  )-----------( 227      ( 21 Jan 2025 06:51 )             42.3     01-21    136  |  100  |  53[H]  ----------------------------<  163[H]  3.9   |  20[L]  |  2.05[H]    Ca    9.2      21 Jan 2025 06:46  Phos  4.1     01-21  Mg     2.3     01-21    TPro  6.0  /  Alb  3.6  /  TBili  1.0  /  DBili  x   /  AST  24  /  ALT  24  /  AlkPhos  152[H]  01-21

## 2025-01-21 NOTE — DISCHARGE NOTE NURSING/CASE MANAGEMENT/SOCIAL WORK - NSDCPEFALRISK_GEN_ALL_CORE
For information on Fall & Injury Prevention, visit: [email or web address] OR  [email or web address] OR  https://www.cdc.gov/steadi/patient.html

## 2025-01-22 DIAGNOSIS — N18.2 CHRONIC KIDNEY DISEASE, STAGE 2 (MILD): ICD-10-CM

## 2025-01-22 DIAGNOSIS — I77.1 STRICTURE OF ARTERY: ICD-10-CM

## 2025-01-22 LAB
ALBUMIN SERPL ELPH-MCNC: 3.7 G/DL — SIGNIFICANT CHANGE UP (ref 3.3–5)
ALP SERPL-CCNC: 156 U/L — HIGH (ref 40–120)
ALT FLD-CCNC: 23 U/L — SIGNIFICANT CHANGE UP (ref 10–45)
ANION GAP SERPL CALC-SCNC: 14 MMOL/L — SIGNIFICANT CHANGE UP (ref 5–17)
AST SERPL-CCNC: 23 U/L — SIGNIFICANT CHANGE UP (ref 10–40)
BASOPHILS # BLD AUTO: 0.05 K/UL — SIGNIFICANT CHANGE UP (ref 0–0.2)
BASOPHILS NFR BLD AUTO: 0.5 % — SIGNIFICANT CHANGE UP (ref 0–2)
BILIRUB SERPL-MCNC: 1.1 MG/DL — SIGNIFICANT CHANGE UP (ref 0.2–1.2)
BUN SERPL-MCNC: 51 MG/DL — HIGH (ref 7–23)
CALCIUM SERPL-MCNC: 9.1 MG/DL — SIGNIFICANT CHANGE UP (ref 8.4–10.5)
CHLORIDE SERPL-SCNC: 101 MMOL/L — SIGNIFICANT CHANGE UP (ref 96–108)
CO2 SERPL-SCNC: 21 MMOL/L — LOW (ref 22–31)
CREAT SERPL-MCNC: 1.97 MG/DL — HIGH (ref 0.5–1.3)
CULTURE RESULTS: SIGNIFICANT CHANGE UP
CULTURE RESULTS: SIGNIFICANT CHANGE UP
EGFR: 37 ML/MIN/1.73M2 — LOW
EOSINOPHIL # BLD AUTO: 0.22 K/UL — SIGNIFICANT CHANGE UP (ref 0–0.5)
EOSINOPHIL NFR BLD AUTO: 2.3 % — SIGNIFICANT CHANGE UP (ref 0–6)
GLUCOSE BLDC GLUCOMTR-MCNC: 136 MG/DL — HIGH (ref 70–99)
GLUCOSE BLDC GLUCOMTR-MCNC: 137 MG/DL — HIGH (ref 70–99)
GLUCOSE BLDC GLUCOMTR-MCNC: 142 MG/DL — HIGH (ref 70–99)
GLUCOSE BLDC GLUCOMTR-MCNC: 79 MG/DL — SIGNIFICANT CHANGE UP (ref 70–99)
GLUCOSE SERPL-MCNC: 137 MG/DL — HIGH (ref 70–99)
HCT VFR BLD CALC: 44.2 % — SIGNIFICANT CHANGE UP (ref 39–50)
HGB BLD-MCNC: 14.3 G/DL — SIGNIFICANT CHANGE UP (ref 13–17)
IMM GRANULOCYTES NFR BLD AUTO: 0.5 % — SIGNIFICANT CHANGE UP (ref 0–0.9)
LYMPHOCYTES # BLD AUTO: 1.15 K/UL — SIGNIFICANT CHANGE UP (ref 1–3.3)
LYMPHOCYTES # BLD AUTO: 11.8 % — LOW (ref 13–44)
MAGNESIUM SERPL-MCNC: 2.4 MG/DL — SIGNIFICANT CHANGE UP (ref 1.6–2.6)
MCHC RBC-ENTMCNC: 30.6 PG — SIGNIFICANT CHANGE UP (ref 27–34)
MCHC RBC-ENTMCNC: 32.4 G/DL — SIGNIFICANT CHANGE UP (ref 32–36)
MCV RBC AUTO: 94.4 FL — SIGNIFICANT CHANGE UP (ref 80–100)
MONOCYTES # BLD AUTO: 0.92 K/UL — HIGH (ref 0–0.9)
MONOCYTES NFR BLD AUTO: 9.4 % — SIGNIFICANT CHANGE UP (ref 2–14)
NEUTROPHILS # BLD AUTO: 7.37 K/UL — SIGNIFICANT CHANGE UP (ref 1.8–7.4)
NEUTROPHILS NFR BLD AUTO: 75.5 % — SIGNIFICANT CHANGE UP (ref 43–77)
NRBC # BLD: 0 /100 WBCS — SIGNIFICANT CHANGE UP (ref 0–0)
NRBC BLD-RTO: 0 /100 WBCS — SIGNIFICANT CHANGE UP (ref 0–0)
PHOSPHATE SERPL-MCNC: 4.1 MG/DL — SIGNIFICANT CHANGE UP (ref 2.5–4.5)
PLATELET # BLD AUTO: 224 K/UL — SIGNIFICANT CHANGE UP (ref 150–400)
POTASSIUM SERPL-MCNC: 4 MMOL/L — SIGNIFICANT CHANGE UP (ref 3.5–5.3)
POTASSIUM SERPL-SCNC: 4 MMOL/L — SIGNIFICANT CHANGE UP (ref 3.5–5.3)
PROT SERPL-MCNC: 6.1 G/DL — SIGNIFICANT CHANGE UP (ref 6–8.3)
RBC # BLD: 4.68 M/UL — SIGNIFICANT CHANGE UP (ref 4.2–5.8)
RBC # FLD: 13.6 % — SIGNIFICANT CHANGE UP (ref 10.3–14.5)
SODIUM SERPL-SCNC: 136 MMOL/L — SIGNIFICANT CHANGE UP (ref 135–145)
SPECIMEN SOURCE: SIGNIFICANT CHANGE UP
SPECIMEN SOURCE: SIGNIFICANT CHANGE UP
WBC # BLD: 9.76 K/UL — SIGNIFICANT CHANGE UP (ref 3.8–10.5)
WBC # FLD AUTO: 9.76 K/UL — SIGNIFICANT CHANGE UP (ref 3.8–10.5)

## 2025-01-22 PROCEDURE — 93925 LOWER EXTREMITY STUDY: CPT | Mod: 26

## 2025-01-22 PROCEDURE — 99232 SBSQ HOSP IP/OBS MODERATE 35: CPT

## 2025-01-22 PROCEDURE — 99222 1ST HOSP IP/OBS MODERATE 55: CPT

## 2025-01-22 PROCEDURE — G0545: CPT

## 2025-01-22 RX ORDER — AMOXICILLIN AND CLAVULANATE POTASSIUM 200; 28.5 MG/5ML; MG/5ML
1 POWDER, FOR SUSPENSION ORAL
Qty: 4 | Refills: 0
Start: 2025-01-22 | End: 2025-01-23

## 2025-01-22 RX ORDER — INSULIN LISPRO 100/ML
5 VIAL (ML) SUBCUTANEOUS ONCE
Refills: 0 | Status: COMPLETED | OUTPATIENT
Start: 2025-01-22 | End: 2025-01-22

## 2025-01-22 RX ORDER — AMOXICILLIN AND CLAVULANATE POTASSIUM 200; 28.5 MG/5ML; MG/5ML
1 POWDER, FOR SUSPENSION ORAL EVERY 12 HOURS
Refills: 0 | Status: DISCONTINUED | OUTPATIENT
Start: 2025-01-22 | End: 2025-01-23

## 2025-01-22 RX ADMIN — AMPICILLIN SODIUM AND SULBACTAM SODIUM 200 GRAM(S): 2; 1 INJECTION, POWDER, FOR SOLUTION INTRAMUSCULAR; INTRAVENOUS at 05:00

## 2025-01-22 RX ADMIN — Medication 11 UNIT(S): at 11:49

## 2025-01-22 RX ADMIN — SODIUM BICARBONATE 650 MILLIGRAM(S): 42 INJECTION, SOLUTION INTRAVENOUS at 22:00

## 2025-01-22 RX ADMIN — Medication 50 MILLIGRAM(S): at 05:04

## 2025-01-22 RX ADMIN — Medication 11 UNIT(S): at 08:03

## 2025-01-22 RX ADMIN — SODIUM BICARBONATE 650 MILLIGRAM(S): 42 INJECTION, SOLUTION INTRAVENOUS at 13:44

## 2025-01-22 RX ADMIN — ISOSORBIDE DINITRATE 10 MILLIGRAM(S): 40 TABLET ORAL at 05:04

## 2025-01-22 RX ADMIN — ISOSORBIDE DINITRATE 10 MILLIGRAM(S): 40 TABLET ORAL at 18:22

## 2025-01-22 RX ADMIN — Medication 40 MILLIGRAM(S): at 13:45

## 2025-01-22 RX ADMIN — Medication 75 MILLIGRAM(S): at 11:49

## 2025-01-22 RX ADMIN — Medication 50 MILLIGRAM(S): at 05:03

## 2025-01-22 RX ADMIN — AMOXICILLIN AND CLAVULANATE POTASSIUM 1 TABLET(S): 200; 28.5 POWDER, FOR SUSPENSION ORAL at 18:22

## 2025-01-22 RX ADMIN — MUPIROCIN 1 APPLICATION(S): 2 CREAM TOPICAL at 18:21

## 2025-01-22 RX ADMIN — MUPIROCIN 1 APPLICATION(S): 2 CREAM TOPICAL at 07:01

## 2025-01-22 RX ADMIN — INSULIN GLARGINE-YFGN 10 UNIT(S): 100 INJECTION, SOLUTION SUBCUTANEOUS at 22:01

## 2025-01-22 RX ADMIN — MUPIROCIN 1 APPLICATION(S): 2 CREAM TOPICAL at 05:04

## 2025-01-22 RX ADMIN — ISOSORBIDE DINITRATE 10 MILLIGRAM(S): 40 TABLET ORAL at 11:49

## 2025-01-22 RX ADMIN — Medication 5 UNIT(S): at 18:22

## 2025-01-22 RX ADMIN — SODIUM BICARBONATE 650 MILLIGRAM(S): 42 INJECTION, SOLUTION INTRAVENOUS at 05:04

## 2025-01-22 RX ADMIN — Medication 40 MILLIGRAM(S): at 05:03

## 2025-01-22 NOTE — PROGRESS NOTE ADULT - ATTENDING COMMENTS
RAFA Calle MD have participated in the daily care of this patient  and have seen and examined the patient today and agree with  the  evaluation, assessment and plan of the surgical house officer  RAFA Calel MD have personally seen and examined the patient at bedside today at  6  pm

## 2025-01-22 NOTE — PROGRESS NOTE ADULT - SUBJECTIVE AND OBJECTIVE BOX
Date of service: 25 @ 20:05      Patient is a 69y old  Male who presents with a chief complaint of left foot infection (2025 11:23)                                                               INTERVAL HPI/OVERNIGHT EVENTS:    REVIEW OF SYSTEMS:     CONSTITUTIONAL: No weakness, fevers or chills  EYES/ENT: No visual changes , no ear ache   NECK: No pain or stiffness  RESPIRATORY: No cough, wheezing,  No shortness of breath  CARDIOVASCULAR: No chest pain or palpitations  GASTROINTESTINAL: No abdominal pain  . No nausea, vomiting, or hematemesis; No diarrhea or constipation. No melena or hematochezia.  GENITOURINARY: No dysuria, frequency or hematuria  NEUROLOGICAL: No numbness or weakness  SKIN: No itching, burning, rashes, or lesions                                                                                                                                                                                                                                                                                 Medications:  MEDICATIONS  (STANDING):  amoxicillin  875 milliGRAM(s)/clavulanate 1 Tablet(s) Oral every 12 hours  clopidogrel Tablet 75 milliGRAM(s) Oral daily  dextrose 5%. 1000 milliLiter(s) (50 mL/Hr) IV Continuous <Continuous>  dextrose 5%. 1000 milliLiter(s) (100 mL/Hr) IV Continuous <Continuous>  dextrose 50% Injectable 25 Gram(s) IV Push once  dextrose 50% Injectable 12.5 Gram(s) IV Push once  dextrose 50% Injectable 25 Gram(s) IV Push once  dextrose Oral Gel 15 Gram(s) Oral once  glucagon  Injectable 1 milliGRAM(s) IntraMuscular once  influenza  Vaccine (HIGH DOSE) 0.5 milliLiter(s) IntraMuscular once  insulin glargine Injectable (LANTUS) 10 Unit(s) SubCutaneous at bedtime  insulin lispro (ADMELOG) corrective regimen sliding scale   SubCutaneous three times a day before meals  insulin lispro (ADMELOG) corrective regimen sliding scale   SubCutaneous at bedtime  insulin lispro Injectable (ADMELOG) 11 Unit(s) SubCutaneous before breakfast  insulin lispro Injectable (ADMELOG) 11 Unit(s) SubCutaneous before lunch  insulin lispro Injectable (ADMELOG) 11 Unit(s) SubCutaneous before dinner  isosorbide   dinitrate Tablet (ISORDIL) 10 milliGRAM(s) Oral three times a day  metoprolol succinate ER 50 milliGRAM(s) Oral daily  mupirocin 2% Nasal 1 Application(s) Both Nostrils two times a day  mupirocin 2% Ointment 1 Application(s) Topical <User Schedule>  sodium bicarbonate 650 milliGRAM(s) Oral three times a day  sodium chloride 0.9%. 250 milliLiter(s) (50 mL/Hr) IV Continuous <Continuous>  spironolactone 50 milliGRAM(s) Oral daily    MEDICATIONS  (PRN):  acetaminophen     Tablet .. 650 milliGRAM(s) Oral every 6 hours PRN Temp greater or equal to 38C (100.4F), Mild Pain (1 - 3)       Allergies    atorvastatin (Muscle Pain (Severe))    Intolerances      Vital Signs Last 24 Hrs  T(C): 36.5 (2025 13:55), Max: 36.9 (2025 04:47)  T(F): 97.7 (2025 13:55), Max: 98.5 (2025 04:47)  HR: 78 (2025 18:00) (73 - 82)  BP: 158/78 (2025 18:00) (137/75 - 158/78)  BP(mean): --  RR: 18 (2025 18:00) (18 - 18)  SpO2: 99% (2025 18:00) (93% - 99%)    Parameters below as of 2025 18:00  Patient On (Oxygen Delivery Method): room air      CAPILLARY BLOOD GLUCOSE      POCT Blood Glucose.: 79 mg/dL (2025 17:37)  POCT Blood Glucose.: 136 mg/dL (2025 11:41)  POCT Blood Glucose.: 137 mg/dL (2025 07:30)  POCT Blood Glucose.: 187 mg/dL (2025 22:03)       @ :  -   @ 07:00  --------------------------------------------------------  IN: 240 mL / OUT: 400 mL / NET: -160 mL     @ 07:  -   @ 20:05  --------------------------------------------------------  IN: 480 mL / OUT: 0 mL / NET: 480 mL      Physical Exam:    Daily     Daily Weight in k.8 (2025 13:53)  General:  Well appearing, NAD, not cachetic  HEENT:  Nonicteric, PERRLA  CV:  RRR, S1S2   Lungs:  CTA B/L, no wheezes, rales, rhonchi  Abdomen:  Soft, non-tender, no distended, positive BS  Extremities:  2+ pulses, no c/c, no edema  Skin:  Warm and dry, no rashes  :  No moreno  Neuro:  AAOx3, non-focal, grossly intact                                                                                                                                                                                                                                                                                                LABS:                               14.3   9.76  )-----------( 224      ( 2025 06:33 )             44.2                          136  |  101  |  51[H]  ----------------------------<  137[H]  4.0   |  21[L]  |  1.97[H]    Ca    9.1      2025 06:33  Phos  4.1       Mg     2.4         TPro  6.1  /  Alb  3.7  /  TBili  1.1  /  DBili  x   /  AST  23  /  ALT  23  /  AlkPhos  156[H]                         RADIOLOGY & ADDITIONAL TESTS         I personally reviewed: [  ]EKG   [  ]CXR    [  ] CT      A/P:         Discussed with :     Derek consultants' Notes   Time spent :

## 2025-01-22 NOTE — PROGRESS NOTE ADULT - PROBLEM SELECTOR PLAN 2
RAFA Calle MD have participated in the daily care of this patient  and have seen and examined the patient today and agree with  the  evaluation, assessment and plan of the surgical house officer  RAFA Calle MD have personally seen and examined the patient at bedside today at  6  pm

## 2025-01-22 NOTE — PROGRESS NOTE ADULT - SUBJECTIVE AND OBJECTIVE BOX
Surgery Progress Note    SUBJECTIVE:  - Patient seen and examined at bedside   --------------------------------------------------------------------------------------------------  OBJECTIVE:   Physical Exam:  General: NAD, resting in bed comfortably  Cardiac: Regular rate, normotensive  Respiratory: Nonlabored respirations, normal cw expansion, on RA  Neuro: AOx3, CNII-XII intact on gross examination  Vascular/Extremities: Warm, well perfused, BL LE erythema, LLE foot dorsal wound with erythema, no discharge  --------------------------------------------------------------------------------------------------  V/S:  Vital Signs Last 24 Hrs  T(C): 36.9 (22 Jan 2025 04:47), Max: 36.9 (22 Jan 2025 04:47)  T(F): 98.5 (22 Jan 2025 04:47), Max: 98.5 (22 Jan 2025 04:47)  HR: 82 (22 Jan 2025 04:47) (71 - 82)  BP: 137/75 (22 Jan 2025 04:47) (137/75 - 144/80)  BP(mean): 101 (21 Jan 2025 19:50) (101 - 101)  RR: 18 (22 Jan 2025 04:47) (18 - 18)  SpO2: 98% (22 Jan 2025 04:47) (98% - 99%)    Parameters below as of 22 Jan 2025 04:47  Patient On (Oxygen Delivery Method): room air        --------------------------------------------------------------------------------------------------  I/Os:    21 Jan 2025 07:01  -  22 Jan 2025 07:00  --------------------------------------------------------  IN:    Oral Fluid: 240 mL  Total IN: 240 mL    OUT:    Voided (mL): 400 mL  Total OUT: 400 mL    Total NET: -160 mL      22 Jan 2025 07:01  -  22 Jan 2025 10:23  --------------------------------------------------------  IN:    Oral Fluid: 240 mL  Total IN: 240 mL    OUT:  Total OUT: 0 mL    Total NET: 240 mL        --------------------------------------------------------------------------------------------------  LABS:                        14.3   9.76  )-----------( 224      ( 22 Jan 2025 06:33 )             44.2     22 Jan 2025 06:33    136    |  101    |  51     ----------------------------<  137    4.0     |  21     |  1.97     Ca    9.1        22 Jan 2025 06:33  Phos  4.1       22 Jan 2025 06:33  Mg     2.4       22 Jan 2025 06:33    TPro  6.1    /  Alb  3.7    /  TBili  1.1    /  DBili  x      /  AST  23     /  ALT  23     /  AlkPhos  156    22 Jan 2025 06:33      CAPILLARY BLOOD GLUCOSE      POCT Blood Glucose.: 137 mg/dL (22 Jan 2025 07:30)  POCT Blood Glucose.: 187 mg/dL (21 Jan 2025 22:03)  POCT Blood Glucose.: 198 mg/dL (21 Jan 2025 17:06)  POCT Blood Glucose.: 76 mg/dL (21 Jan 2025 12:23)        LIVER FUNCTIONS - ( 22 Jan 2025 06:33 )  Alb: 3.7 g/dL / Pro: 6.1 g/dL / ALK PHOS: 156 U/L / ALT: 23 U/L / AST: 23 U/L / GGT: x             Culture - Abscess with Gram Stain (collected 20 Jan 2025 14:38)  Source: .Abscess  Gram Stain (20 Jan 2025 21:44):    No polymorphonuclear leukocytes per low power field    No organisms seen per oil power field  Preliminary Report (21 Jan 2025 16:30):    No growth to date.      Urinalysis Basic - ( 22 Jan 2025 06:33 )    Color: x / Appearance: x / SG: x / pH: x  Gluc: 137 mg/dL / Ketone: x  / Bili: x / Urobili: x   Blood: x / Protein: x / Nitrite: x   Leuk Esterase: x / RBC: x / WBC x   Sq Epi: x / Non Sq Epi: x / Bacteria: x      --------------------------------------------------------------------------------------------------  MEDICATIONS  (STANDING):  ampicillin/sulbactam  IVPB 3 Gram(s) IV Intermittent every 12 hours  clopidogrel Tablet 75 milliGRAM(s) Oral daily  dextrose 5%. 1000 milliLiter(s) (100 mL/Hr) IV Continuous <Continuous>  dextrose 5%. 1000 milliLiter(s) (50 mL/Hr) IV Continuous <Continuous>  dextrose 50% Injectable 25 Gram(s) IV Push once  dextrose 50% Injectable 12.5 Gram(s) IV Push once  dextrose 50% Injectable 25 Gram(s) IV Push once  dextrose Oral Gel 15 Gram(s) Oral once  furosemide    Tablet 40 milliGRAM(s) Oral two times a day  glucagon  Injectable 1 milliGRAM(s) IntraMuscular once  influenza  Vaccine (HIGH DOSE) 0.5 milliLiter(s) IntraMuscular once  insulin glargine Injectable (LANTUS) 10 Unit(s) SubCutaneous at bedtime  insulin lispro (ADMELOG) corrective regimen sliding scale   SubCutaneous three times a day before meals  insulin lispro (ADMELOG) corrective regimen sliding scale   SubCutaneous at bedtime  insulin lispro Injectable (ADMELOG) 11 Unit(s) SubCutaneous before breakfast  insulin lispro Injectable (ADMELOG) 11 Unit(s) SubCutaneous before lunch  insulin lispro Injectable (ADMELOG) 11 Unit(s) SubCutaneous before dinner  isosorbide   dinitrate Tablet (ISORDIL) 10 milliGRAM(s) Oral three times a day  metoprolol succinate ER 50 milliGRAM(s) Oral daily  mupirocin 2% Nasal 1 Application(s) Both Nostrils two times a day  mupirocin 2% Ointment 1 Application(s) Topical <User Schedule>  rivaroxaban 15 milliGRAM(s) Oral with dinner  sodium bicarbonate 650 milliGRAM(s) Oral three times a day  sodium chloride 0.9%. 250 milliLiter(s) (50 mL/Hr) IV Continuous <Continuous>  spironolactone 50 milliGRAM(s) Oral daily    MEDICATIONS  (PRN):  acetaminophen     Tablet .. 650 milliGRAM(s) Oral every 6 hours PRN Temp greater or equal to 38C (100.4F), Mild Pain (1 - 3)    --------------------------------------------------------------------------------------------------     Surgery Progress Note    SUBJECTIVE:  - Patient seen and examined at bedside   pt wants to proceed w lle angio possible intervention     --------------------------------------------------------------------------------------------------  OBJECTIVE:   Physical Exam:  General: NAD, resting in bed comfortably  Cardiac: Regular rate, normotensive  Respiratory: Nonlabored respirations, normal cw expansion, on RA  Neuro: AOx3, CNII-XII intact on gross examination  Vascular/Extremities: Warm, well perfused, BL LE erythema, LLE foot dorsal wound with erythema, no discharge  --------------------------------------------------------------------------------------------------  V/S:  Vital Signs Last 24 Hrs  T(C): 36.9 (22 Jan 2025 04:47), Max: 36.9 (22 Jan 2025 04:47)  T(F): 98.5 (22 Jan 2025 04:47), Max: 98.5 (22 Jan 2025 04:47)  HR: 82 (22 Jan 2025 04:47) (71 - 82)  BP: 137/75 (22 Jan 2025 04:47) (137/75 - 144/80)  BP(mean): 101 (21 Jan 2025 19:50) (101 - 101)  RR: 18 (22 Jan 2025 04:47) (18 - 18)  SpO2: 98% (22 Jan 2025 04:47) (98% - 99%)    Parameters below as of 22 Jan 2025 04:47  Patient On (Oxygen Delivery Method): room air        --------------------------------------------------------------------------------------------------  I/Os:    21 Jan 2025 07:01  -  22 Jan 2025 07:00  --------------------------------------------------------  IN:    Oral Fluid: 240 mL  Total IN: 240 mL    OUT:    Voided (mL): 400 mL  Total OUT: 400 mL    Total NET: -160 mL      22 Jan 2025 07:01  -  22 Jan 2025 10:23  --------------------------------------------------------  IN:    Oral Fluid: 240 mL  Total IN: 240 mL    OUT:  Total OUT: 0 mL    Total NET: 240 mL        --------------------------------------------------------------------------------------------------  LABS:                        14.3   9.76  )-----------( 224      ( 22 Jan 2025 06:33 )             44.2     22 Jan 2025 06:33    136    |  101    |  51     ----------------------------<  137    4.0     |  21     |  1.97     Ca    9.1        22 Jan 2025 06:33  Phos  4.1       22 Jan 2025 06:33  Mg     2.4       22 Jan 2025 06:33    TPro  6.1    /  Alb  3.7    /  TBili  1.1    /  DBili  x      /  AST  23     /  ALT  23     /  AlkPhos  156    22 Jan 2025 06:33      CAPILLARY BLOOD GLUCOSE      POCT Blood Glucose.: 137 mg/dL (22 Jan 2025 07:30)  POCT Blood Glucose.: 187 mg/dL (21 Jan 2025 22:03)  POCT Blood Glucose.: 198 mg/dL (21 Jan 2025 17:06)  POCT Blood Glucose.: 76 mg/dL (21 Jan 2025 12:23)        LIVER FUNCTIONS - ( 22 Jan 2025 06:33 )  Alb: 3.7 g/dL / Pro: 6.1 g/dL / ALK PHOS: 156 U/L / ALT: 23 U/L / AST: 23 U/L / GGT: x             Culture - Abscess with Gram Stain (collected 20 Jan 2025 14:38)  Source: .Abscess  Gram Stain (20 Jan 2025 21:44):    No polymorphonuclear leukocytes per low power field    No organisms seen per oil power field  Preliminary Report (21 Jan 2025 16:30):    No growth to date.      Urinalysis Basic - ( 22 Jan 2025 06:33 )    Color: x / Appearance: x / SG: x / pH: x  Gluc: 137 mg/dL / Ketone: x  / Bili: x / Urobili: x   Blood: x / Protein: x / Nitrite: x   Leuk Esterase: x / RBC: x / WBC x   Sq Epi: x / Non Sq Epi: x / Bacteria: x      --------------------------------------------------------------------------------------------------  MEDICATIONS  (STANDING):  ampicillin/sulbactam  IVPB 3 Gram(s) IV Intermittent every 12 hours  clopidogrel Tablet 75 milliGRAM(s) Oral daily  dextrose 5%. 1000 milliLiter(s) (100 mL/Hr) IV Continuous <Continuous>  dextrose 5%. 1000 milliLiter(s) (50 mL/Hr) IV Continuous <Continuous>  dextrose 50% Injectable 25 Gram(s) IV Push once  dextrose 50% Injectable 12.5 Gram(s) IV Push once  dextrose 50% Injectable 25 Gram(s) IV Push once  dextrose Oral Gel 15 Gram(s) Oral once  furosemide    Tablet 40 milliGRAM(s) Oral two times a day  glucagon  Injectable 1 milliGRAM(s) IntraMuscular once  influenza  Vaccine (HIGH DOSE) 0.5 milliLiter(s) IntraMuscular once  insulin glargine Injectable (LANTUS) 10 Unit(s) SubCutaneous at bedtime  insulin lispro (ADMELOG) corrective regimen sliding scale   SubCutaneous three times a day before meals  insulin lispro (ADMELOG) corrective regimen sliding scale   SubCutaneous at bedtime  insulin lispro Injectable (ADMELOG) 11 Unit(s) SubCutaneous before breakfast  insulin lispro Injectable (ADMELOG) 11 Unit(s) SubCutaneous before lunch  insulin lispro Injectable (ADMELOG) 11 Unit(s) SubCutaneous before dinner  isosorbide   dinitrate Tablet (ISORDIL) 10 milliGRAM(s) Oral three times a day  metoprolol succinate ER 50 milliGRAM(s) Oral daily  mupirocin 2% Nasal 1 Application(s) Both Nostrils two times a day  mupirocin 2% Ointment 1 Application(s) Topical <User Schedule>  rivaroxaban 15 milliGRAM(s) Oral with dinner  sodium bicarbonate 650 milliGRAM(s) Oral three times a day  sodium chloride 0.9%. 250 milliLiter(s) (50 mL/Hr) IV Continuous <Continuous>  spironolactone 50 milliGRAM(s) Oral daily    MEDICATIONS  (PRN):  acetaminophen     Tablet .. 650 milliGRAM(s) Oral every 6 hours PRN Temp greater or equal to 38C (100.4F), Mild Pain (1 - 3)    --------------------------------------------------------------------------------------------------

## 2025-01-22 NOTE — PROGRESS NOTE ADULT - SUBJECTIVE AND OBJECTIVE BOX
Overnight events noted      VITAL:  T(C): , Max: 36.9 (01-22-25 @ 04:47)  T(F): , Max: 98.5 (01-22-25 @ 04:47)  HR: 82 (01-22-25 @ 04:47)  BP: 137/75 (01-22-25 @ 04:47)  BP(mean): 101 (01-21-25 @ 19:50)  RR: 18 (01-22-25 @ 04:47)  SpO2: 98% (01-22-25 @ 04:47)  Wt(kg): --      PHYSICAL EXAM:  Constitutional: NAD, Alert  HEENT: NCAT, MMM  Neck: Supple, No JVD  Respiratory: CTA-b/l  Cardiovascular: reg s1s2  Gastrointestinal: BS+, soft, NT/ND  Extremities: No peripheral edema b/l  Neurological: no focal deficits; strength grossly intact  Back: no CVAT b/l  Skin: No rashes, no nevi      LABS:                        14.3   9.76  )-----------( 224      ( 22 Jan 2025 06:33 )             44.2     Na(136)/K(4.0)/Cl(101)/HCO3(21)/BUN(51)/Cr(1.97)Glu(137)/Ca(9.1)/Mg(2.4)/PO4(4.1)    01-22 @ 06:33  Na(136)/K(3.9)/Cl(100)/HCO3(20)/BUN(53)/Cr(2.05)Glu(163)/Ca(9.2)/Mg(2.3)/PO4(4.1)    01-21 @ 06:46  Na(141)/K(3.6)/Cl(104)/HCO3(21)/BUN(52)/Cr(2.21)Glu(86)/Ca(9.5)/Mg(2.4)/PO4(4.4)    01-20 @ 06:42      IMPRESSION: 69M w/ HTN, DM2, AFib, CAD-10 stents, HFrEF, and CKD, 1/18/25 p/w L foot wound and acute on chronic HFrEF    (1)CKD - 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 At/near baseline  (2)Metabolic acidosis - controlled, on PO NaHCO3  (3)Hypokalemia - controlled, on Spironolactone  (4)ID - L foot wound infection - on IV Unasyn  (5)CV - volume seems reasonable on high-dose PO diuretics    RECOMMEND:  (1)Lasix, Spironolactone, and NaHCO3 as ordered  (2)If for discharge, can f/u at my office Wed 2/19/25 at 10:40am as previously scheduled        Markell Walton MD  [facility]  Office/on call physician: [phone]  Cell (7a-7p): [phone]       no pain, no sob      VITAL:  T(C): , Max: 36.9 (01-22-25 @ 04:47)  T(F): , Max: 98.5 (01-22-25 @ 04:47)  HR: 82 (01-22-25 @ 04:47)  BP: 137/75 (01-22-25 @ 04:47)  BP(mean): 101 (01-21-25 @ 19:50)  RR: 18 (01-22-25 @ 04:47)  SpO2: 98% (01-22-25 @ 04:47)  Wt(kg): --      PHYSICAL EXAM:  Constitutional: NAD, Alert  HEENT: NCAT, MMM  Neck: Supple, No JVD  Respiratory: CTA-b/l  Cardiovascular: reg s1s2  Gastrointestinal: BS+, soft, NT/ND  Extremities: No peripheral edema b/l  Neurological: no focal deficits; strength grossly intact  Back: no CVAT b/l  Skin: No rashes, no nevi      LABS:                        14.3   9.76  )-----------( 224      ( 22 Jan 2025 06:33 )             44.2     Na(136)/K(4.0)/Cl(101)/HCO3(21)/BUN(51)/Cr(1.97)Glu(137)/Ca(9.1)/Mg(2.4)/PO4(4.1)    01-22 @ 06:33  Na(136)/K(3.9)/Cl(100)/HCO3(20)/BUN(53)/Cr(2.05)Glu(163)/Ca(9.2)/Mg(2.3)/PO4(4.1)    01-21 @ 06:46  Na(141)/K(3.6)/Cl(104)/HCO3(21)/BUN(52)/Cr(2.21)Glu(86)/Ca(9.5)/Mg(2.4)/PO4(4.4)    01-20 @ 06:42      IMPRESSION: 69M w/ HTN, DM2, AFib, CAD-10 stents, HFrEF, and CKD, 1/18/25 p/w L foot wound and acute on chronic HFrEF    (1)CKD - 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 At/near baseline  (2)Metabolic acidosis - controlled, on PO NaHCO3  (3)Hypokalemia - controlled, on Spironolactone  (4)ID - L foot wound infection - on IV Unasyn  (5)CV - volume seems reasonable on high-dose PO diuretics    RECOMMEND:  (1)Lasix, Spironolactone, and NaHCO3 as ordered  (2)If for discharge, can f/u at my office Wed 2/19/25 at 10:40am as previously scheduled        Markell Walton MD  [facility]  Office/on call physician: [phone]  Cell (7a-7p): [phone]

## 2025-01-22 NOTE — CONSULT NOTE ADULT - SUBJECTIVE AND OBJECTIVE BOX
Patient seen and examined. Chart with pertinent labs and imaging reviewed.  Full note to follow.   Asked this am to comment re: cellulitis involving L foot.  At this point in time no concern for uncontrolled or deep infection  Would give Augmentin 875mg PO q12h x48h  No ID objection to discharge  Thank you for the courtesy of this referral.  Left message for Dr. Mcdaniel.  Chicho Ho MD  Attending Physician  [facility]  Division of Infectious Diseases  [phone]  ------------------------  [facility]  Division of Infectious Diseases  [phone]    KRISTY COYLE  69y, Male  [id number]    HPI--      PMH/PSH--  Carotid Stenosis    Diabetes Mellitus    Neuropathy    Former smoker    CAD in native artery    Type 2 diabetes mellitus    Hyperlipidemia, unspecified hyperlipidemia type    Essential hypertension, hypertension with unspecified goal    Afib    Hematoma of leg    Stented coronary artery    CHF (congestive heart failure)    No Past Surgical History    s/p Carotid Endarterectomy        Allergies--  atorvastatin (Muscle Pain (Severe))      Medications--  Antibiotics: ampicillin/sulbactam  IVPB 3 Gram(s) IV Intermittent every 12 hours    Immunologic: influenza  Vaccine (HIGH DOSE) 0.5 milliLiter(s) IntraMuscular once    Other: acetaminophen     Tablet .. PRN  clopidogrel Tablet  dextrose 5%.  dextrose 5%.  dextrose 50% Injectable  dextrose 50% Injectable  dextrose 50% Injectable  dextrose Oral Gel  furosemide    Tablet  glucagon  Injectable  insulin glargine Injectable (LANTUS)  insulin lispro (ADMELOG) corrective regimen sliding scale  insulin lispro (ADMELOG) corrective regimen sliding scale  insulin lispro Injectable (ADMELOG)  insulin lispro Injectable (ADMELOG)  insulin lispro Injectable (ADMELOG)  isosorbide   dinitrate Tablet (ISORDIL)  metoprolol succinate ER  mupirocin 2% Nasal  mupirocin 2% Ointment  rivaroxaban  sodium bicarbonate  sodium chloride 0.9%.  spironolactone    Antimicrobials last 90 days per EMR: MEDICATIONS  (STANDING):  ampicillin/sulbactam  IVPB   200 mL/Hr IV Intermittent (01-22-25 @ 05:00)   200 mL/Hr IV Intermittent (01-21-25 @ 17:22)   200 mL/Hr IV Intermittent (01-21-25 @ 05:15)   200 mL/Hr IV Intermittent (01-20-25 @ 17:31)   200 mL/Hr IV Intermittent (01-20-25 @ 05:01)   200 mL/Hr IV Intermittent (01-19-25 @ 17:26)    piperacillin/tazobactam IVPB.   200 mL/Hr IV Intermittent (01-18-25 @ 07:07)    piperacillin/tazobactam IVPB.-   25 mL/Hr IV Intermittent (01-18-25 @ 11:56)    piperacillin/tazobactam IVPB.-   25 mL/Hr IV Intermittent (01-18-25 @ 18:41)    piperacillin/tazobactam IVPB..   25 mL/Hr IV Intermittent (01-19-25 @ 12:20)   25 mL/Hr IV Intermittent (01-19-25 @ 03:13)    piperacillin/tazobactam IVPB...   200 mL/Hr IV Intermittent (01-17-25 @ 18:48)    vancomycin  IVPB.   250 mL/Hr IV Intermittent (01-17-25 @ 21:37)        Social History--  EtOH: denies   Tobacco: denies   Drug Use: denies     Family/Marital History--  Family history of heart disease    FH: atrial fibrillation          Travel/Environmental/Occupational History:      Review of Systems:  A >=10-point review of systems was obtained.     Pertinent positives and negatives--  Constitutional: No fevers. No Chills. No Rigors.   Eyes:  ENMT:  Cardiovascular: No chest pain. No palpitations.  Respiratory: No shortness of breath. No cough.  Gastrointestinal: No nausea or vomiting. No diarrhea or constipation.   Genitourinary:  Musculoskeletal:  Skin:  Neurologic:  Psychiatric: Pleasant. Appropriate affect.  Endocrine:  Heme/Lymphatic:  Allergy/Immunologic:    Review of systems otherwise negative except as previously noted.    Physical Exam--  Vital Signs: T(F): 98.4 (01-22-25 @ 11:01), Max: 98.5 (01-22-25 @ 04:47)  HR: 73 (01-22-25 @ 11:01)  BP: 154/76 (01-22-25 @ 11:01)  RR: 18 (01-22-25 @ 11:01)  SpO2: 97% (01-22-25 @ 11:01)  Wt(kg): --  General: Nontoxic-appearing Male in no acute distress.  HEENT: AT/NC. PERRL. EOMI. Anicteric. Conjunctiva pink and moist. Oropharynx clear. Dentition fair.  Neck: Not rigid. No sense of mass.  Nodes: None palpable.  Lungs: Clear bilaterally without rales, wheezing or rhonchi  Heart: Regular rate and rhythm. No Murmur. No rub. No gallop. No palpable thrill.  Abdomen: Bowel sounds present and normoactive. Soft. Nondistended. Nontender. No sense of mass. No organomegaly.  Back: No spinal tenderness. No costovertebral angle tenderness.   Extremities: No cyanosis or clubbing. No edema.   Skin: Warm. Dry. Good turgor. No rash. No vasculitic stigmata.  Psychiatric: Appropriate affect and mood for situation.         Laboratory & Imaging Data--  CBC                        14.3   9.76  )-----------( 224      ( 22 Jan 2025 06:33 )             44.2       Chemistries  01-22    136  |  101  |  51[H]  ----------------------------<  137[H]  4.0   |  21[L]  |  1.97[H]    Ca    9.1      22 Jan 2025 06:33  Phos  4.1     01-22  Mg     2.4     01-22    TPro  6.1  /  Alb  3.7  /  TBili  1.1  /  DBili  x   /  AST  23  /  ALT  23  /  AlkPhos  156[H]  01-22      Culture Data    Culture - Abscess with Gram Stain (collected 20 Jan 2025 14:38)  Source: .Abscess  Gram Stain (20 Jan 2025 21:44):    No polymorphonuclear leukocytes per low power field    No organisms seen per oil power field  Preliminary Report (21 Jan 2025 16:30):    No growth to date.    Culture - Blood (collected 17 Jan 2025 18:45)  Source: .Blood BLOOD  Preliminary Report (21 Jan 2025 23:00):    No growth at 4 days    Culture - Blood (collected 17 Jan 2025 18:35)  Source: .Blood BLOOD  Preliminary Report (21 Jan 2025 23:00):    No growth at 4 days         Patient seen and examined. Chart with pertinent labs and imaging reviewed.  Full note to follow.   Asked this am to comment re: cellulitis involving L foot.  At this point in time no concern for uncontrolled or deep infection  Would give Augmentin 875mg PO q12h x48h  No ID objection to discharge  Thank you for the courtesy of this referral.  Left message for Dr. Mcdaniel.  Chicho Ho MD  Attending Physician  [facility]  Division of Infectious Diseases  [phone]  ------------------------  [facility]  Division of Infectious Diseases  [phone]    KRISTY COYLE  69y, Male  [id number]    HPI--  This is a 69-year-old man with multiple medical issues including CHF, coronary disease status post multiple stents, high blood pressure, atrial fibrillation, diabetes mellitus type 2, renal insufficiency, dyslipidemia, peripghearl neuropathy. He is previously known to me.  To the point in time he had rhabdomyolysis related to a statin.  The patient statesfor the past 3 weeks has had increasing shortness of breath with increased lower extremity edema.  Approximately 1 week ago he had a scratch on his left foot and had weeping and some increased erythema.  He has presented to his primary care physician who referred him for admission.  The patient states that he is admitted primarily for his breathing issues.    He said no fevers chills or rigors.  He denies pain in the foot.  He denies any purulent drainage from the foot.    Here he was managed for CHF.  His breathing is much improved–was able to walk in the hallway today and is presently off oxygen.  Denies any pain in the foot.  He is on broad-spectrum antibiotics.    Patient otherwise feeling approximately back to baseline.    PMH/PSH--  Carotid Stenosis    Diabetes Mellitus    Neuropathy    Former smoker    CAD in native artery    Type 2 diabetes mellitus    Hyperlipidemia, unspecified hyperlipidemia type    Essential hypertension, hypertension with unspecified goal    Afib    Hematoma of leg    Stented coronary artery    CHF (congestive heart failure)    No Past Surgical History    s/p Carotid Endarterectomy        Allergies--  atorvastatin (Muscle Pain (Severe))      Medications--  Antibiotics: ampicillin/sulbactam  IVPB 3 Gram(s) IV Intermittent every 12 hours    Immunologic: influenza  Vaccine (HIGH DOSE) 0.5 milliLiter(s) IntraMuscular once    Other: acetaminophen     Tablet .. PRN  clopidogrel Tablet  dextrose 5%.  dextrose 5%.  dextrose 50% Injectable  dextrose 50% Injectable  dextrose 50% Injectable  dextrose Oral Gel  furosemide    Tablet  glucagon  Injectable  insulin glargine Injectable (LANTUS)  insulin lispro (ADMELOG) corrective regimen sliding scale  insulin lispro (ADMELOG) corrective regimen sliding scale  insulin lispro Injectable (ADMELOG)  insulin lispro Injectable (ADMELOG)  insulin lispro Injectable (ADMELOG)  isosorbide   dinitrate Tablet (ISORDIL)  metoprolol succinate ER  mupirocin 2% Nasal  mupirocin 2% Ointment  rivaroxaban  sodium bicarbonate  sodium chloride 0.9%.  spironolactone    Antimicrobials last 90 days per EMR: MEDICATIONS  (STANDING):  ampicillin/sulbactam  IVPB   200 mL/Hr IV Intermittent (01-22-25 @ 05:00)   200 mL/Hr IV Intermittent (01-21-25 @ 17:22)   200 mL/Hr IV Intermittent (01-21-25 @ 05:15)   200 mL/Hr IV Intermittent (01-20-25 @ 17:31)   200 mL/Hr IV Intermittent (01-20-25 @ 05:01)   200 mL/Hr IV Intermittent (01-19-25 @ 17:26)    piperacillin/tazobactam IVPB.   200 mL/Hr IV Intermittent (01-18-25 @ 07:07)    piperacillin/tazobactam IVPB.-   25 mL/Hr IV Intermittent (01-18-25 @ 11:56)    piperacillin/tazobactam IVPB.-   25 mL/Hr IV Intermittent (01-18-25 @ 18:41)    piperacillin/tazobactam IVPB..   25 mL/Hr IV Intermittent (01-19-25 @ 12:20)   25 mL/Hr IV Intermittent (01-19-25 @ 03:13)    piperacillin/tazobactam IVPB...   200 mL/Hr IV Intermittent (01-17-25 @ 18:48)    vancomycin  IVPB.   250 mL/Hr IV Intermittent (01-17-25 @ 21:37)        Social History--  EtOH: denies   Tobacco: former  Drug Use: denies     Family/Marital History--  Family history of heart disease    FH: atrial fibrillation          Review of Systems:  A >=10-point review of systems was obtained.   Review of systems otherwise negative except as previously noted.    Physical Exam--  Vital Signs: T(F): 98.4 (01-22-25 @ 11:01), Max: 98.5 (01-22-25 @ 04:47)  HR: 73 (01-22-25 @ 11:01)  BP: 154/76 (01-22-25 @ 11:01)  RR: 18 (01-22-25 @ 11:01)  SpO2: 97% (01-22-25 @ 11:01)  Wt(kg): --  General: Nontoxic-appearing Male in no acute distress.  HEENT: AT/NC. Anicteric. Conjunctiva pink and moist. Oropharynx clear.   Neck: Not rigid. No sense of mass.  Nodes: None palpable.  Lungs: Flory BS B  Heart: Irreg irreg  Abdomen: Bowel sounds present and normoactive. Soft. Nondistended. Nontender.  Back: No spinal tenderness. No costovertebral angle tenderness.   Extremities: No cyanosis or clubbing. Left foot with dorsal superficial abrasion looking injury, with posttraumatic appearing changes, sharply demarcated, nonblanching to purpuric.  No proximal lymphangitis.  No tenderness.  No increased calor.  No crepitus or fluctuance.  Nothing devitalized.  Sensory diminished, known neuropathy.  Skin: Warm. Dry. Good turgor. No rash. No vasculitic stigmata.  Psychiatric: Appropriate affect and mood for situation.         Laboratory & Imaging Data--  CBC                        14.3   9.76  )-----------( 224      ( 22 Jan 2025 06:33 )             44.2       Chemistries  01-22    136  |  101  |  51[H]  ----------------------------<  137[H]  4.0   |  21[L]  |  1.97[H]    Ca    9.1      22 Jan 2025 06:33  Phos  4.1     01-22  Mg     2.4     01-22    TPro  6.1  /  Alb  3.7  /  TBili  1.1  /  DBili  x   /  AST  23  /  ALT  23  /  AlkPhos  156[H]  01-22      Culture Data    Culture - Abscess with Gram Stain (collected 20 Jan 2025 14:38)  Source: .Abscess  Gram Stain (20 Jan 2025 21:44):    No polymorphonuclear leukocytes per low power field    No organisms seen per oil power field  Preliminary Report (21 Jan 2025 16:30):    No growth to date.    Culture - Blood (collected 17 Jan 2025 18:45)  Source: .Blood BLOOD  Preliminary Report (21 Jan 2025 23:00):    No growth at 4 days    Culture - Blood (collected 17 Jan 2025 18:35)  Source: .Blood BLOOD  Preliminary Report (21 Jan 2025 23:00):    No growth at 4 days

## 2025-01-22 NOTE — PROGRESS NOTE ADULT - PROBLEM SELECTOR PLAN 2
1 week hx of L foot wound, seen by Podiatry in ED   L foot wound to dermis w/ cellulitis  Hx of significant PAD   XR foot ankle without signs of OM or gas, ESR CRP WBC WNL and afebrile   Lactate elevated to 2.8  Vascular no intervention  s/p Zosyn and Vancomycin in ED     Plan:   s/p Unasyn  f/u wound culture  will DC pt with augmenting 875 BID and doxycycline 100mg BID for 5 days  -Follow up with Dr Gibson within 1 week of discharge

## 2025-01-22 NOTE — PROGRESS NOTE ADULT - SUBJECTIVE AND OBJECTIVE BOX
No dyspnea   Erythema is improved  /76  HR 73  Lungs clear  Irregular 2/6 systolic murmur  no edema    BUN 51  Crt 1.97  CBC  normal    Imp:  Cellulitis - much improved  CHF - much improved   CKD - at baseline  Stable for DC from a CV stand point  To see Dr Moon in the office within 1 week

## 2025-01-22 NOTE — PROGRESS NOTE ADULT - SUBJECTIVE AND OBJECTIVE BOX
Patient is a 69y old  Male who presents with a chief complaint of left foot infection (22 Jan 2025 09:01)       INTERVAL HPI/OVERNIGHT EVENTS:  Patient seen and evaluated at bedside.  Pt is resting comfortable in NAD. Denies N/V/F/C.  Pain rated at X/10    Allergies    atorvastatin (Muscle Pain (Severe))    Intolerances        Vital Signs Last 24 Hrs  T(C): 36.9 (22 Jan 2025 04:47), Max: 36.9 (22 Jan 2025 04:47)  T(F): 98.5 (22 Jan 2025 04:47), Max: 98.5 (22 Jan 2025 04:47)  HR: 82 (22 Jan 2025 04:47) (71 - 82)  BP: 137/75 (22 Jan 2025 04:47) (137/75 - 144/80)  BP(mean): 101 (21 Jan 2025 19:50) (101 - 101)  RR: 18 (22 Jan 2025 04:47) (18 - 18)  SpO2: 98% (22 Jan 2025 04:47) (98% - 99%)    Parameters below as of 22 Jan 2025 04:47  Patient On (Oxygen Delivery Method): room air        LABS:                        14.3   9.76  )-----------( 224      ( 22 Jan 2025 06:33 )             44.2     01-22    136  |  101  |  51[H]  ----------------------------<  137[H]  4.0   |  21[L]  |  1.97[H]    Ca    9.1      22 Jan 2025 06:33  Phos  4.1     01-22  Mg     2.4     01-22    TPro  6.1  /  Alb  3.7  /  TBili  1.1  /  DBili  x   /  AST  23  /  ALT  23  /  AlkPhos  156[H]  01-22      Urinalysis Basic - ( 22 Jan 2025 06:33 )    Color: x / Appearance: x / SG: x / pH: x  Gluc: 137 mg/dL / Ketone: x  / Bili: x / Urobili: x   Blood: x / Protein: x / Nitrite: x   Leuk Esterase: x / RBC: x / WBC x   Sq Epi: x / Non Sq Epi: x / Bacteria: x      CAPILLARY BLOOD GLUCOSE      POCT Blood Glucose.: 137 mg/dL (22 Jan 2025 07:30)  POCT Blood Glucose.: 187 mg/dL (21 Jan 2025 22:03)  POCT Blood Glucose.: 198 mg/dL (21 Jan 2025 17:06)  POCT Blood Glucose.: 76 mg/dL (21 Jan 2025 12:23)      Lower Extremity Physical Exam:    Vascular: DP/PT 0/4, B/L, CFT <3 seconds B/L, Temperature gradient warm to cool, B/L.   Neuro: Epicritic sensation diminished to the level of digits, B/L.  Musculoskeletal/Ortho: unremarkable  Skin: Left foot dorsal midfoot wound to dermis with erythema extending from the base of digits 2-4 to the proximal midfoot, no drainage, no malodor. Mild improvement of erythema with elevation. Right foot no open wounds, no acute signs of infection.    RADIOLOGY & ADDITIONAL TESTS:

## 2025-01-22 NOTE — CONSULT NOTE ADULT - ASSESSMENT
Asked this am to comment re: cellulitis involving L foot.  At this point in time no concern for uncontrolled or deep infection  Would give Augmentin 875mg PO q12h x48h  No ID objection to discharge  Thank you for the courtesy of this referral.  Left message for Dr. Mcdaniel.  Chicho Ho MD  Attending Physician  [facility]  Division of Infectious Diseases  [phone]

## 2025-01-22 NOTE — CHART NOTE - NSCHARTNOTEFT_GEN_A_CORE
Preop Diagnosis: PAD  Planned Procedure: LLE angio  Surgeon: Luc    Vital Signs Last 24 Hrs  T(C): 36.5 (2025 13:55), Max: 36.9 (2025 04:47)  T(F): 97.7 (2025 13:55), Max: 98.5 (2025 04:47)  HR: 78 (2025 13:55) (71 - 82)  BP: 152/85 (2025 13:55) (137/75 - 154/76)  BP(mean): 101 (2025 19:50) (101 - 101)  RR: 18 (2025 13:55) (18 - 18)  SpO2: 99% (2025 13:55) (93% - 99%)    Parameters below as of 2025 13:55  Patient On (Oxygen Delivery Method): room air      Daily     Daily Weight in k.8 (2025 13:53)    Pertinent Labs:                        14.3   9.76  )-----------( 224      ( 2025 06:33 )             44.2         136  |  101  |  51[H]  ----------------------------<  137[H]  4.0   |  21[L]  |  1.97[H]    Ca    9.1      2025 06:33  Phos  4.1       Mg     2.4         TPro  6.1  /  Alb  3.7  /  TBili  1.1  /  DBili  x   /  AST  23  /  ALT  23  /  AlkPhos  156[H]        ------------------------------------------------------------------------------------------------------------------  Type and Screen:  ------------------------------------------------------------------------------------------------------------------  U/A: Urinalysis Basic - ( 2025 06:33 )    Color: x / Appearance: x / SG: x / pH: x  Gluc: 137 mg/dL / Ketone: x  / Bili: x / Urobili: x   Blood: x / Protein: x / Nitrite: x   Leuk Esterase: x / RBC: x / WBC x   Sq Epi: x / Non Sq Epi: x / Bacteria: x      ------------------------------------------------------------------------------------------------------------------  uHCG:  ------------------------------------------------------------------------------------------------------------------  CXR:  ------------------------------------------------------------------------------------------------------------------  EKG:  ------------------------------------------------------------------------------------------------------------------  Echo:  ------------------------------------------------------------------------------------------------------------------    Assessment: HPI:    Plan:  - OR 25 for LLE angio with Dr. Calle  - NPO after midnight, except medications   - IVF while NPO  - Consent signed in chart  - Pre-op Labs at 4:00 am on : CBC, BMP, Mag, Phos, PT/PTT/INR, T&S    Vascular Surgery  [id number]

## 2025-01-22 NOTE — PROGRESS NOTE ADULT - ASSESSMENT
69M presents for left foot wound to dermis with cellulitis  - Pt seen and evaluated  - Afebrile, no leukocytosis   - Left foot dorsal midfoot wound to dermis with improved erythema extending from the base of digits 2-4 to the proximal midfoot, no drainage, no malodor. Mild improvement of erythema with elevation. Right foot no open wounds, no acute signs of infection.  - Left foot X-ray read: no OM, no gas  - Left foot wound culture no growth  - Recommend ID consult   - Vasc recs, appreciated   - Pt is stable for discharge from podiatry standpoint pending final ID recs/ appearance   - Wound care instruction and follow up information in discharge note provider   - Discussed with attending

## 2025-01-22 NOTE — PROGRESS NOTE ADULT - ASSESSMENT
68 y/o male with hx of CAD s/p multiple  stents, HFrEF (EF~50% as of 11/26/23), A-fib, HTN, HLD, T2DM, CKD3, former smoke      Acute On chronic Systolic  CHF  Improving with diuresis  O2 sat on exertion 94%  Will hold diuretics since patient will undergo Angiography      ·  Problem: Cellulitis of left foot.   Complete antibiotics per ID      ·  Problem: Diabetes.   Continue insulin        ·  Problem: CAD S/P percutaneous coronary angioplasty.   Continue cardiac meds follow up with cardiology      ·  Problem: Chronic atrial fibrillation.   ·  Plan: Patient w/ known hx of Afib but ekg consistent w/ Atrial flutter on admission    c/w home Toprol 50 QD and Xarelto 15 QD  -f/u w/ EP outpatient for A-flutter.

## 2025-01-22 NOTE — PROGRESS NOTE ADULT - ASSESSMENT
69M PMH DMII, CAD s/p stent, HLD, HTN who presented with left foot wound.     Recommendations:  -No acute vascular surgery intervention at this time.  -NIKHIL/PVR reviewed.  -Abx.  -Continue with local wound care.      Vascular Surgery  [id number]   69M PMH DMII, CAD s/p stent, HLD, HTN who presented with left foot wound.     Recommendations:  -d/w pt risks and benefits of  lle angio possible intervention  pt consents  continue  abx rx  -Continue with local wound care.  will follow     Vascular Surgery  [id number]

## 2025-01-22 NOTE — PROGRESS NOTE ADULT - PROBLEM SELECTOR PLAN 1
~2weeks hx of dyspnea on exertion limiting daily activities   Extensive cardiac hx including cad s/p 10 stents, HFrEF w/ LVEF 50% in 11/2023   ProBNP elevated to 69[address], appears volume overloaded on exam  Hx consistent w/ worsening CHF vs. cardiac ischemia rather than infectious etiology   Clear lungs on CXR   Negative Flu/Cov/RSV  s/p 80mg IVP in ED     Plan:   -f/u TTE   -Supplemental O2 as needed   -f/u full RVP BCx x2  -Lasix 40mg IV BID for diuresis   -Strict I&O w/ goal net negative 1-2L   -Daily standing weights  -Cardiology consult in AM given extensive cardiac hx and worsening HF

## 2025-01-23 ENCOUNTER — APPOINTMENT (OUTPATIENT)
Dept: VASCULAR SURGERY | Facility: HOSPITAL | Age: 70
End: 2025-01-23

## 2025-01-23 ENCOUNTER — TRANSCRIPTION ENCOUNTER (OUTPATIENT)
Age: 70
End: 2025-01-23

## 2025-01-23 LAB
ANION GAP SERPL CALC-SCNC: 13 MMOL/L — SIGNIFICANT CHANGE UP (ref 5–17)
APTT BLD: 41.9 SEC — HIGH (ref 24.5–35.6)
BLD GP AB SCN SERPL QL: NEGATIVE — SIGNIFICANT CHANGE UP
BUN SERPL-MCNC: 51 MG/DL — HIGH (ref 7–23)
CALCIUM SERPL-MCNC: 9.4 MG/DL — SIGNIFICANT CHANGE UP (ref 8.4–10.5)
CHLORIDE SERPL-SCNC: 100 MMOL/L — SIGNIFICANT CHANGE UP (ref 96–108)
CO2 SERPL-SCNC: 22 MMOL/L — SIGNIFICANT CHANGE UP (ref 22–31)
CREAT SERPL-MCNC: 2.02 MG/DL — HIGH (ref 0.5–1.3)
EGFR: 35 ML/MIN/1.73M2 — LOW
GLUCOSE BLDC GLUCOMTR-MCNC: 149 MG/DL — HIGH (ref 70–99)
GLUCOSE BLDC GLUCOMTR-MCNC: 173 MG/DL — HIGH (ref 70–99)
GLUCOSE BLDC GLUCOMTR-MCNC: 246 MG/DL — HIGH (ref 70–99)
GLUCOSE SERPL-MCNC: 218 MG/DL — HIGH (ref 70–99)
HCT VFR BLD CALC: 45.8 % — SIGNIFICANT CHANGE UP (ref 39–50)
HGB BLD-MCNC: 14.9 G/DL — SIGNIFICANT CHANGE UP (ref 13–17)
INR BLD: 2.08 RATIO — HIGH (ref 0.85–1.16)
MCHC RBC-ENTMCNC: 30.4 PG — SIGNIFICANT CHANGE UP (ref 27–34)
MCHC RBC-ENTMCNC: 32.5 G/DL — SIGNIFICANT CHANGE UP (ref 32–36)
MCV RBC AUTO: 93.5 FL — SIGNIFICANT CHANGE UP (ref 80–100)
NRBC # BLD: 0 /100 WBCS — SIGNIFICANT CHANGE UP (ref 0–0)
NRBC BLD-RTO: 0 /100 WBCS — SIGNIFICANT CHANGE UP (ref 0–0)
PLATELET # BLD AUTO: 219 K/UL — SIGNIFICANT CHANGE UP (ref 150–400)
POTASSIUM SERPL-MCNC: 4 MMOL/L — SIGNIFICANT CHANGE UP (ref 3.5–5.3)
POTASSIUM SERPL-SCNC: 4 MMOL/L — SIGNIFICANT CHANGE UP (ref 3.5–5.3)
PROTHROM AB SERPL-ACNC: 23.5 SEC — HIGH (ref 9.9–13.4)
RBC # BLD: 4.9 M/UL — SIGNIFICANT CHANGE UP (ref 4.2–5.8)
RBC # FLD: 13.5 % — SIGNIFICANT CHANGE UP (ref 10.3–14.5)
RH IG SCN BLD-IMP: POSITIVE — SIGNIFICANT CHANGE UP
SODIUM SERPL-SCNC: 135 MMOL/L — SIGNIFICANT CHANGE UP (ref 135–145)
WBC # BLD: 9.54 K/UL — SIGNIFICANT CHANGE UP (ref 3.8–10.5)
WBC # FLD AUTO: 9.54 K/UL — SIGNIFICANT CHANGE UP (ref 3.8–10.5)

## 2025-01-23 PROCEDURE — 36247 INS CATH ABD/L-EXT ART 3RD: CPT | Mod: LT,59

## 2025-01-23 PROCEDURE — 75710 ARTERY X-RAYS ARM/LEG: CPT | Mod: 26,LT,59

## 2025-01-23 PROCEDURE — 75625 CONTRAST EXAM ABDOMINL AORTA: CPT | Mod: 26

## 2025-01-23 PROCEDURE — 93010 ELECTROCARDIOGRAM REPORT: CPT

## 2025-01-23 PROCEDURE — 37230: CPT | Mod: LT

## 2025-01-23 PROCEDURE — 37234: CPT | Mod: LT

## 2025-01-23 PROCEDURE — 99232 SBSQ HOSP IP/OBS MODERATE 35: CPT

## 2025-01-23 DEVICE — INTRODUCER SET MICROPUNCTURE ACCESS 21G X 7CM: Type: IMPLANTABLE DEVICE | Site: LEFT | Status: FUNCTIONAL

## 2025-01-23 DEVICE — GUIDEWIRE GLIDEWIRE ANGLED TIP 0.035" X 150CM LONG TAPER: Type: IMPLANTABLE DEVICE | Site: LEFT | Status: FUNCTIONAL

## 2025-01-23 DEVICE — IMPLANTABLE DEVICE: Type: IMPLANTABLE DEVICE | Site: LEFT | Status: FUNCTIONAL

## 2025-01-23 DEVICE — INTRO FLEXOR CHECK ANSEL 6FR X 45CM: Type: IMPLANTABLE DEVICE | Site: LEFT | Status: FUNCTIONAL

## 2025-01-23 DEVICE — GWIRE CHOICE PT .014X300CM XSUPPORT: Type: IMPLANTABLE DEVICE | Site: LEFT | Status: FUNCTIONAL

## 2025-01-23 DEVICE — SHEATH INTRODUCER TERUMO PINNACLE PERIPHERAL 6FR X 10CM: Type: IMPLANTABLE DEVICE | Site: LEFT | Status: FUNCTIONAL

## 2025-01-23 DEVICE — CATH ANGIO OMNI FLUSH 4FR X 65CM: Type: IMPLANTABLE DEVICE | Site: LEFT | Status: FUNCTIONAL

## 2025-01-23 DEVICE — DEVICE CLOSURE 6F/7F MYNX GRIP MUST ORDER MIN OF 10 EA: Type: IMPLANTABLE DEVICE | Site: LEFT | Status: FUNCTIONAL

## 2025-01-23 DEVICE — GUIDEWIRE RADIFOCUS GLIDEWIRE ANGLED 0.035" X 260CM STIFF: Type: IMPLANTABLE DEVICE | Site: LEFT | Status: FUNCTIONAL

## 2025-01-23 DEVICE — CATH QUICK CROSS .014X135CM: Type: IMPLANTABLE DEVICE | Site: LEFT | Status: FUNCTIONAL

## 2025-01-23 DEVICE — GUIDEWIRE RADIFOCUS GLIDEWIRE STANDARD ANGLED TIP 0.035" X 260CM: Type: IMPLANTABLE DEVICE | Site: LEFT | Status: FUNCTIONAL

## 2025-01-23 DEVICE — SHEATH INTRODUCER TERUMO PINNACLE PERIPHERAL 5FR X 10CM: Type: IMPLANTABLE DEVICE | Site: LEFT | Status: FUNCTIONAL

## 2025-01-23 RX ORDER — DM/PSEUDOEPHED/ACETAMINOPHEN 10-30-250
25 CAPSULE ORAL ONCE
Refills: 0 | Status: DISCONTINUED | OUTPATIENT
Start: 2025-01-23 | End: 2025-01-27

## 2025-01-23 RX ORDER — AMOXICILLIN AND CLAVULANATE POTASSIUM 200; 28.5 MG/5ML; MG/5ML
1 POWDER, FOR SUSPENSION ORAL EVERY 12 HOURS
Refills: 0 | Status: DISCONTINUED | OUTPATIENT
Start: 2025-01-23 | End: 2025-01-27

## 2025-01-23 RX ORDER — MAGNESIUM, ALUMINUM HYDROXIDE 200-225/5
30 SUSPENSION, ORAL (FINAL DOSE FORM) ORAL ONCE
Refills: 0 | Status: COMPLETED | OUTPATIENT
Start: 2025-01-23 | End: 2025-01-23

## 2025-01-23 RX ORDER — INSULIN LISPRO 100/ML
VIAL (ML) SUBCUTANEOUS AT BEDTIME
Refills: 0 | Status: DISCONTINUED | OUTPATIENT
Start: 2025-01-23 | End: 2025-01-27

## 2025-01-23 RX ORDER — ISOSORBIDE DINITRATE 40 MG/1
10 TABLET ORAL THREE TIMES A DAY
Refills: 0 | Status: DISCONTINUED | OUTPATIENT
Start: 2025-01-23 | End: 2025-01-27

## 2025-01-23 RX ORDER — ONDANSETRON 4 MG/1
4 TABLET, ORALLY DISINTEGRATING ORAL ONCE
Refills: 0 | Status: DISCONTINUED | OUTPATIENT
Start: 2025-01-23 | End: 2025-01-23

## 2025-01-23 RX ORDER — CILOSTAZOL 100 MG
100 TABLET ORAL
Refills: 0 | Status: DISCONTINUED | OUTPATIENT
Start: 2025-01-23 | End: 2025-01-24

## 2025-01-23 RX ORDER — ISOSORBIDE DINITRATE 40 MG/1
10 TABLET ORAL ONCE
Refills: 0 | Status: COMPLETED | OUTPATIENT
Start: 2025-01-23 | End: 2025-01-23

## 2025-01-23 RX ORDER — INSULIN LISPRO 100/ML
11 VIAL (ML) SUBCUTANEOUS
Refills: 0 | Status: DISCONTINUED | OUTPATIENT
Start: 2025-01-23 | End: 2025-01-27

## 2025-01-23 RX ORDER — INSULIN GLARGINE-YFGN 100 [IU]/ML
10 INJECTION, SOLUTION SUBCUTANEOUS AT BEDTIME
Refills: 0 | Status: DISCONTINUED | OUTPATIENT
Start: 2025-01-23 | End: 2025-01-27

## 2025-01-23 RX ORDER — MUPIROCIN 2 G/100G
1 CREAM TOPICAL
Refills: 0 | Status: DISCONTINUED | OUTPATIENT
Start: 2025-01-23 | End: 2025-01-27

## 2025-01-23 RX ORDER — DM/PSEUDOEPHED/ACETAMINOPHEN 10-30-250
15 CAPSULE ORAL ONCE
Refills: 0 | Status: DISCONTINUED | OUTPATIENT
Start: 2025-01-23 | End: 2025-01-27

## 2025-01-23 RX ORDER — SODIUM CHLORIDE 9 G/ML
1000 INJECTION, SOLUTION INTRAVENOUS
Refills: 0 | Status: DISCONTINUED | OUTPATIENT
Start: 2025-01-23 | End: 2025-01-23

## 2025-01-23 RX ORDER — SODIUM BICARBONATE 42 MG/ML
650 INJECTION, SOLUTION INTRAVENOUS THREE TIMES A DAY
Refills: 0 | Status: DISCONTINUED | OUTPATIENT
Start: 2025-01-23 | End: 2025-01-27

## 2025-01-23 RX ORDER — BACTERIOSTATIC SODIUM CHLORIDE 0.9 %
250 VIAL (ML) INJECTION
Refills: 0 | Status: DISCONTINUED | OUTPATIENT
Start: 2025-01-23 | End: 2025-01-27

## 2025-01-23 RX ORDER — SODIUM CHLORIDE 9 G/ML
1000 INJECTION, SOLUTION INTRAVENOUS
Refills: 0 | Status: DISCONTINUED | OUTPATIENT
Start: 2025-01-23 | End: 2025-01-27

## 2025-01-23 RX ORDER — OXYCODONE HYDROCHLORIDE 30 MG/1
5 TABLET ORAL ONCE
Refills: 0 | Status: DISCONTINUED | OUTPATIENT
Start: 2025-01-23 | End: 2025-01-23

## 2025-01-23 RX ORDER — ACETAMINOPHEN 160 MG/5ML
975 SUSPENSION ORAL EVERY 6 HOURS
Refills: 0 | Status: DISCONTINUED | OUTPATIENT
Start: 2025-01-23 | End: 2025-01-27

## 2025-01-23 RX ORDER — HYDROMORPHONE HYDROCHLORIDE 4 MG/ML
0.5 INJECTION, SOLUTION INTRAMUSCULAR; INTRAVENOUS; SUBCUTANEOUS
Refills: 0 | Status: DISCONTINUED | OUTPATIENT
Start: 2025-01-23 | End: 2025-01-23

## 2025-01-23 RX ORDER — ACETAMINOPHEN 160 MG/5ML
650 SUSPENSION ORAL EVERY 6 HOURS
Refills: 0 | Status: DISCONTINUED | OUTPATIENT
Start: 2025-01-23 | End: 2025-01-23

## 2025-01-23 RX ORDER — NITROGLYCERIN 0.4 MG/1
0.4 TABLET SUBLINGUAL ONCE
Refills: 0 | Status: COMPLETED | OUTPATIENT
Start: 2025-01-23 | End: 2025-01-23

## 2025-01-23 RX ORDER — GLUCAGON 3 MG/1
1 POWDER NASAL ONCE
Refills: 0 | Status: DISCONTINUED | OUTPATIENT
Start: 2025-01-23 | End: 2025-01-27

## 2025-01-23 RX ORDER — METOPROLOL SUCCINATE 25 MG
50 TABLET, EXTENDED RELEASE 24 HR ORAL DAILY
Refills: 0 | Status: DISCONTINUED | OUTPATIENT
Start: 2025-01-23 | End: 2025-01-24

## 2025-01-23 RX ORDER — INSULIN LISPRO 100/ML
VIAL (ML) SUBCUTANEOUS
Refills: 0 | Status: DISCONTINUED | OUTPATIENT
Start: 2025-01-23 | End: 2025-01-27

## 2025-01-23 RX ORDER — DM/PSEUDOEPHED/ACETAMINOPHEN 10-30-250
12.5 CAPSULE ORAL ONCE
Refills: 0 | Status: DISCONTINUED | OUTPATIENT
Start: 2025-01-23 | End: 2025-01-27

## 2025-01-23 RX ADMIN — Medication 100 MILLIGRAM(S): at 21:18

## 2025-01-23 RX ADMIN — AMOXICILLIN AND CLAVULANATE POTASSIUM 1 TABLET(S): 200; 28.5 POWDER, FOR SUSPENSION ORAL at 05:49

## 2025-01-23 RX ADMIN — Medication 50 MILLIGRAM(S): at 05:49

## 2025-01-23 RX ADMIN — AMOXICILLIN AND CLAVULANATE POTASSIUM 1 TABLET(S): 200; 28.5 POWDER, FOR SUSPENSION ORAL at 21:18

## 2025-01-23 RX ADMIN — MUPIROCIN 1 APPLICATION(S): 2 CREAM TOPICAL at 05:50

## 2025-01-23 RX ADMIN — ACETAMINOPHEN 975 MILLIGRAM(S): 160 SUSPENSION ORAL at 23:08

## 2025-01-23 RX ADMIN — INSULIN GLARGINE-YFGN 10 UNIT(S): 100 INJECTION, SOLUTION SUBCUTANEOUS at 22:44

## 2025-01-23 RX ADMIN — Medication 1: at 07:57

## 2025-01-23 RX ADMIN — Medication 30 MILLILITER(S): at 23:35

## 2025-01-23 RX ADMIN — ISOSORBIDE DINITRATE 10 MILLIGRAM(S): 40 TABLET ORAL at 05:50

## 2025-01-23 RX ADMIN — SODIUM BICARBONATE 650 MILLIGRAM(S): 42 INJECTION, SOLUTION INTRAVENOUS at 21:18

## 2025-01-23 RX ADMIN — ISOSORBIDE DINITRATE 10 MILLIGRAM(S): 40 TABLET ORAL at 23:37

## 2025-01-23 RX ADMIN — MUPIROCIN 1 APPLICATION(S): 2 CREAM TOPICAL at 07:57

## 2025-01-23 RX ADMIN — Medication 50 MILLILITER(S): at 15:24

## 2025-01-23 RX ADMIN — SODIUM BICARBONATE 650 MILLIGRAM(S): 42 INJECTION, SOLUTION INTRAVENOUS at 05:49

## 2025-01-23 RX ADMIN — NITROGLYCERIN 0.4 MILLIGRAM(S): 0.4 TABLET SUBLINGUAL at 23:06

## 2025-01-23 NOTE — PRE-ANESTHESIA EVALUATION ADULT - NSANTHOSAYNRD_GEN_A_CORE
No. YUN screening performed.  STOP BANG Legend: 0-2 = LOW Risk; 3-4 = INTERMEDIATE Risk; 5-8 = HIGH Risk

## 2025-01-23 NOTE — PROGRESS NOTE ADULT - SUBJECTIVE AND OBJECTIVE BOX
Patient is a 69y old  Male who presents with a chief complaint of left foot infection (23 Jan 2025 10:39)       INTERVAL HPI/OVERNIGHT EVENTS:  Patient seen and evaluated at bedside.  Pt is resting comfortable in NAD. Denies N/V/F/C.      Allergies    atorvastatin (Muscle Pain (Severe))    Intolerances        Vital Signs Last 24 Hrs  T(C): 36.6 (23 Jan 2025 04:16), Max: 36.9 (22 Jan 2025 11:01)  T(F): 97.9 (23 Jan 2025 04:16), Max: 98.4 (22 Jan 2025 11:01)  HR: 77 (23 Jan 2025 04:16) (73 - 85)  BP: 161/95 (23 Jan 2025 04:16) (152/85 - 171/84)  BP(mean): --  RR: 18 (23 Jan 2025 04:16) (18 - 18)  SpO2: 99% (23 Jan 2025 04:16) (93% - 99%)    Parameters below as of 23 Jan 2025 04:16  Patient On (Oxygen Delivery Method): room air        LABS:                        14.9   9.54  )-----------( 219      ( 23 Jan 2025 05:20 )             45.8     01-23    135  |  100  |  51[H]  ----------------------------<  218[H]  4.0   |  22  |  2.02[H]    Ca    9.4      23 Jan 2025 05:20  Phos  4.1     01-22  Mg     2.4     01-22    TPro  6.1  /  Alb  3.7  /  TBili  1.1  /  DBili  x   /  AST  23  /  ALT  23  /  AlkPhos  156[H]  01-22    PT/INR - ( 23 Jan 2025 05:20 )   PT: 23.5 sec;   INR: 2.08 ratio         PTT - ( 23 Jan 2025 05:20 )  PTT:41.9 sec  Urinalysis Basic - ( 23 Jan 2025 05:20 )    Color: x / Appearance: x / SG: x / pH: x  Gluc: 218 mg/dL / Ketone: x  / Bili: x / Urobili: x   Blood: x / Protein: x / Nitrite: x   Leuk Esterase: x / RBC: x / WBC x   Sq Epi: x / Non Sq Epi: x / Bacteria: x      CAPILLARY BLOOD GLUCOSE      POCT Blood Glucose.: 173 mg/dL (23 Jan 2025 07:30)  POCT Blood Glucose.: 142 mg/dL (22 Jan 2025 21:56)  POCT Blood Glucose.: 79 mg/dL (22 Jan 2025 17:37)  POCT Blood Glucose.: 136 mg/dL (22 Jan 2025 11:41)      Lower Extremity Physical Exam:  Vascular: DP/PT 0/4, B/L, CFT <3 seconds B/L, Temperature gradient warm to cool, B/L.   Neuro: Epicritic sensation diminished to the level of digits, B/L.  Musculoskeletal/Ortho: unremarkable  Skin: Left foot dorsal midfoot wound to dermis with improving erythema extending from the base of digits 2-4 to the proximal midfoot, no drainage, no malodor, improvement of erythema with elevation. Right foot no open wounds, no acute signs of infection.    RADIOLOGY & ADDITIONAL TESTS:

## 2025-01-23 NOTE — PROGRESS NOTE ADULT - SUBJECTIVE AND OBJECTIVE BOX
Date of service: 25 @ 18:40      Patient is a 69y old  Male who presents with a chief complaint of left foot infection (2025 10:53)                                                               INTERVAL HPI/OVERNIGHT EVENTS:    REVIEW OF SYSTEMS:       awakening from anesthesia at the time exam                                                                                                                                                                                                                          Medications:  MEDICATIONS  (STANDING):  acetaminophen     Tablet .. 975 milliGRAM(s) Oral every 6 hours  amoxicillin  875 milliGRAM(s)/clavulanate 1 Tablet(s) Oral every 12 hours  cilostazol 100 milliGRAM(s) Oral two times a day  dextrose 5%. 1000 milliLiter(s) (100 mL/Hr) IV Continuous <Continuous>  dextrose 5%. 1000 milliLiter(s) (50 mL/Hr) IV Continuous <Continuous>  dextrose 50% Injectable 25 Gram(s) IV Push once  dextrose 50% Injectable 25 Gram(s) IV Push once  dextrose 50% Injectable 12.5 Gram(s) IV Push once  dextrose Oral Gel 15 Gram(s) Oral once  glucagon  Injectable 1 milliGRAM(s) IntraMuscular once  influenza  Vaccine (HIGH DOSE) 0.5 milliLiter(s) IntraMuscular once  insulin glargine Injectable (LANTUS) 10 Unit(s) SubCutaneous at bedtime  insulin lispro (ADMELOG) corrective regimen sliding scale   SubCutaneous three times a day before meals  insulin lispro (ADMELOG) corrective regimen sliding scale   SubCutaneous at bedtime  insulin lispro Injectable (ADMELOG) 11 Unit(s) SubCutaneous before breakfast  insulin lispro Injectable (ADMELOG) 11 Unit(s) SubCutaneous before lunch  insulin lispro Injectable (ADMELOG) 11 Unit(s) SubCutaneous before dinner  isosorbide   dinitrate Tablet (ISORDIL) 10 milliGRAM(s) Oral three times a day  metoprolol succinate ER 50 milliGRAM(s) Oral daily  mupirocin 2% Nasal 1 Application(s) Both Nostrils two times a day  mupirocin 2% Ointment 1 Application(s) Topical <User Schedule>  sodium bicarbonate 650 milliGRAM(s) Oral three times a day  sodium chloride 0.9%. 250 milliLiter(s) (50 mL/Hr) IV Continuous <Continuous>  spironolactone 50 milliGRAM(s) Oral daily    MEDICATIONS  (PRN):       Allergies    atorvastatin (Muscle Pain (Severe))    Intolerances      Vital Signs Last 24 Hrs  T(C): 36.9 (2025 18:14), Max: 36.9 (2025 20:11)  T(F): 98.5 (2025 18:14), Max: 98.5 (2025 18:14)  HR: 75 (2025 18:14) (69 - 85)  BP: 145/92 (2025 18:14) (129/71 - 171/84)  BP(mean): 98 (2025 16:15) (92 - 120)  RR: 16 (2025 18:14) (14 - 18)  SpO2: 99% (2025 18:14) (95% - 100%)    Parameters below as of 2025 18:14  Patient On (Oxygen Delivery Method): room air      CAPILLARY BLOOD GLUCOSE      POCT Blood Glucose.: 149 mg/dL (2025 15:07)  POCT Blood Glucose.: 173 mg/dL (2025 07:30)  POCT Blood Glucose.: 142 mg/dL (2025 21:56)       @ 07: @ 07:00  --------------------------------------------------------  IN: 480 mL / OUT: 0 mL / NET: 480 mL     @ 07: @ 18:40  --------------------------------------------------------  IN: 150 mL / OUT: 0 mL / NET: 150 mL      Physical Exam:    Daily Height in cm: 167.64 (2025 11:40)    Daily Weight in k.2 (2025 04:16)  General: NNAD  HEENT:  Nonicteric, PERRLA  CV:  RRR, S1S2   Lungs:  CTA B/L, no wheezes, rales, rhonchi  Abdomen:  Soft, non-tender, no distended, positive BS  Extremities: LL E in the dressing  Neuro:  Awakening from anesthesia                                                                                                                                                                                                                                                                                        LABS:                               14.9   9.54  )-----------( 219      ( 2025 05:20 )             45.8                          135  |  100  |  51[H]  ----------------------------<  218[H]  4.0   |  22  |  2.02[H]    Ca    9.4      2025 05:20  Phos  4.1       Mg     2.4         TPro  6.1  /  Alb  3.7  /  TBili  1.1  /  DBili  x   /  AST    /  ALT  23  /  AlkPhos  156[H]                         RADIOLOGY & ADDITIONAL TESTS         I personally reviewed: [  ]EKG   [  ]CXR    [  ] CT      A/P:         Discussed with :     Derek consultants' Notes   Time spent :

## 2025-01-23 NOTE — PROGRESS NOTE ADULT - PROBLEM SELECTOR PLAN 1
~2weeks hx of dyspnea on exertion limiting daily activities   Extensive cardiac hx including cad s/p 10 stents, HFrEF w/ LVEF 50% in 11/2023   ProBNP elevated [place] [address], appears volume overloaded on exam  Hx consistent w/ worsening CHF vs. cardiac ischemia rather than infectious etiology   Clear lungs on CXR   Negative Flu/Cov/RSV  s/p 80mg IVP in ED     Plan:   -f/u TTE   -Supplemental O2 as needed   -f/u full RVP BCx x2  -Lasix 40mg IV BID for diuresis   -Strict I&O w/ goal net negative 1-2L   -Daily standing weights  -Cardiology consult in AM given extensive cardiac hx and worsening HF

## 2025-01-23 NOTE — PROGRESS NOTE ADULT - SUBJECTIVE AND OBJECTIVE BOX
Overnight events noted      VITAL:  T(C): , Max: 36.9 (01-22-25 @ 11:01)  T(F): , Max: 98.4 (01-22-25 @ 11:01)  HR: 77 (01-23-25 @ 04:16)  BP: 161/95 (01-23-25 @ 04:16)  BP(mean): --  RR: 18 (01-23-25 @ 04:16)  SpO2: 99% (01-23-25 @ 04:16)  Wt(kg): --      PHYSICAL EXAM:  Constitutional: NAD, Alert  HEENT: NCAT, MMM  Neck: Supple, No JVD  Respiratory: CTA-b/l  Cardiovascular: reg s1s2  Gastrointestinal: BS+, soft, NT/ND  Extremities: No peripheral edema b/l  Neurological: no focal deficits; strength grossly intact  Back: no CVAT b/l  Skin: No rashes, no nevi    LABS:                        14.9   9.54  )-----------( 219      ( 23 Jan 2025 05:20 )             45.8     Na(135)/K(4.0)/Cl(100)/HCO3(22)/BUN(51)/Cr(2.02)Glu(218)/Ca(9.4)/Mg(--)/PO4(--)    01-23 @ 05:20  Na(136)/K(4.0)/Cl(101)/HCO3(21)/BUN(51)/Cr(1.97)Glu(137)/Ca(9.1)/Mg(2.4)/PO4(4.1)    01-22 @ 06:33  Na(136)/K(3.9)/Cl(100)/HCO3(20)/BUN(53)/Cr(2.05)Glu(163)/Ca(9.2)/Mg(2.3)/PO4(4.1)    01-21 @ 06:46      IMPRESSION: 69M w/ HTN, DM2, AFib, CAD-10 stents, HFrEF, and CKD, 1/18/25 p/w L foot wound and acute on chronic HFrEF    (1)CKD - 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 At/near baseline  (2)Metabolic acidosis - controlled, on PO NaHCO3  (3)Hypokalemia - controlled, on Spironolactone  (4)ID - L foot wound infection - on PO Augmentin  (5)Vasc - concern for LLE PAD - potential benefit outweighs renal risk from LE angio        RECOMMEND:  (1)LR 50cc/h as ordered to minimize DENNIS risk  (2)BMP daily  (3)No renal objection to proceeding with LE angio today      Markell Walton MD  [facility]  Office/on call physician: [phone]  Cell (7a-7p): [phone]       No pain, no sob      VITAL:  T(C): , Max: 36.9 (01-22-25 @ 11:01)  T(F): , Max: 98.4 (01-22-25 @ 11:01)  HR: 77 (01-23-25 @ 04:16)  BP: 161/95 (01-23-25 @ 04:16)  BP(mean): --  RR: 18 (01-23-25 @ 04:16)  SpO2: 99% (01-23-25 @ 04:16)  Wt(kg): --      PHYSICAL EXAM:  Constitutional: NAD, Alert  HEENT: NCAT, MMM  Neck: Supple, No JVD  Respiratory: CTA-b/l  Cardiovascular: reg s1s2  Gastrointestinal: BS+, soft, NT/ND  Extremities: No peripheral edema b/l  Neurological: no focal deficits; strength grossly intact  Back: no CVAT b/l  Skin: No rashes, no nevi    LABS:                        14.9   9.54  )-----------( 219      ( 23 Jan 2025 05:20 )             45.8     Na(135)/K(4.0)/Cl(100)/HCO3(22)/BUN(51)/Cr(2.02)Glu(218)/Ca(9.4)/Mg(--)/PO4(--)    01-23 @ 05:20  Na(136)/K(4.0)/Cl(101)/HCO3(21)/BUN(51)/Cr(1.97)Glu(137)/Ca(9.1)/Mg(2.4)/PO4(4.1)    01-22 @ 06:33  Na(136)/K(3.9)/Cl(100)/HCO3(20)/BUN(53)/Cr(2.05)Glu(163)/Ca(9.2)/Mg(2.3)/PO4(4.1)    01-21 @ 06:46      IMPRESSION: 69M w/ HTN, DM2, AFib, CAD-10 stents, HFrEF, and CKD, 1/18/25 p/w L foot wound and acute on chronic HFrEF    (1)CKD - 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 At/near baseline  (2)Metabolic acidosis - controlled, on PO NaHCO3  (3)Hypokalemia - controlled, on Spironolactone  (4)ID - L foot wound infection - on PO Augmentin  (5)Vasc - concern for LLE PAD - potential benefit outweighs renal risk from LE angio        RECOMMEND:  (1)LR 50cc/h as ordered to minimize DENNIS risk  (2)BMP daily  (3)No renal objection to proceeding with LE angio today      Markell Walton MD  [facility]  Office/on call physician: [phone]  Cell (7a-7p): [phone]

## 2025-01-23 NOTE — PROGRESS NOTE ADULT - ASSESSMENT
69M PMH DMII, CAD s/p stent, HLD, HTN who presented with left foot wound. INR elevated this AM to 2.08.     Recommendations:  - d/w pt risks and benefits of lle angio possible intervention  - Consent documented in chart  - Continue  abx rx  - Continue with local wound care.  - Vascular following     Vascular Surgery  [id number]

## 2025-01-23 NOTE — PROVIDER CONTACT NOTE (CHANGE IN STATUS NOTIFICATION) - SITUATION
Pt c/o left chest pain and epigastric pain 5/10 that woke him up, non radiating with no other associated symptoms. States pain is not the same as when he had his stents.

## 2025-01-23 NOTE — PROGRESS NOTE ADULT - SUBJECTIVE AND OBJECTIVE BOX
Vascular Surgery Daily Progress Note  =====================================================    SUBJECTIVE: Patient reports that they're feeling well. NAEO    --------------------------------------------------------------------------------------  VITAL SIGNS:  T(C): 36.6 (01-23-25 @ 04:16), Max: 36.9 (01-22-25 @ 11:01)  HR: 77 (01-23-25 @ 04:16) (73 - 85)  BP: 161/95 (01-23-25 @ 04:16) (152/85 - 171/84)  RR: 18 (01-23-25 @ 04:16) (18 - 18)  SpO2: 99% (01-23-25 @ 04:16) (93% - 99%)  --------------------------------------------------------------------------------------    EXAM    General: NAD, resting in bed comfortably  Cardiac: Regular rate, normotensive  Respiratory: Nonlabored respirations, normal cw expansion, on RA  Neuro: AOx3, CNII-XII intact on gross examination  Vascular/Extremities: Warm, well perfused, BL LE erythema, LLE foot dorsal wound with erythema, no discharge    --------------------------------------------------------------------------------------    IMAGING:  VA Duplex Bilateral Lower Extremity (1/22/25):  FINDINGS:  Ankle brachial index measures 1.15 on the right and 0.92 on the left.    Plaque Description: Diffuse calcified plaque.    Waveform Morphology: Biphasic arterial waveforms are present in the   thighs. Monophasic waveforms are present in the calf.    Stenosis/Occlusion: The right tibioperoneal trunk is occluded. Focal   stenoses are present in the proximal right peroneal artery, the distal   left popliteal artery, and the left tibioperoneal trunk.    The peak systolic velocities of the right lower extremity are as follows:    Distal external iliac artery: 124 cm/s  Common femoral artery: 111 cm/s  Deep femoral artery: 54 cm/s  Superficial femoral artery: 91 cm/s proximal,  111 cm/s mid, 67 cm/s   distal  Popliteal artery: 47 cm/s  Tibioperoneal trunk: Occluded  Posterior tibial artery: 30 cm/s proximal,  30 cm/s mid, 24 cm/s distal  Peroneal artery: 234 cm/s proximal,  23 cm/s mid, 9 cm/s distal  Anterior tibial artery: 37 cm/s proximal,  36 cm/s mid, 22 cm/s distal  Dorsalis pedis artery: 12 cm/s    The peak systolic velocities of the left lower extremity are as follows:    Distal external iliac artery: 156 cm/s  Common femoral artery: 121 cm/s  Deep femoral artery: 78 cm/s  Superficial femoral artery: 105 cm/s proximal,  89 cm/s mid, 100 cm/s   distal  Popliteal artery: 180 cm/s  Tibioperoneal trunk: 413 cm/s  Posterior tibial artery: 58 cm/s proximal,  55 cm/s mid, 45 cm/s distal  Peroneal artery: 87 cm/s proximal,  70 cm/s mid, 53 cm/s distal  Anterior tibial artery: 33 cm/s proximal,  14 cm/s mid, 29 cm/s distal  Dorsalis pedis artery: Not visualized    IMPRESSION:    Diffuse calcified plaque.    Segmental occlusion of the right tibioperoneal trunk.    Hemodynamically significant stenoses of the proximal right peroneal   artery, the distal left popliteal artery, in the left tibioperoneal trunk.    BL LE NIKHIL/PVR (1/19/25):  FINDINGS:    Right NIKHIL is 1.15, within normal limits. Left NIKHIL is 0.92, borderline   abnormal.    Bilateral abnormal toe brachial indices, 0.59 on the right and 0.48 on   the left.    Segmental pressure evaluation is limited due to noncompressible   vasculature. Pressure drop identified from left calf to ankle.    PVR waveforms are asymmetrically decreased amplitude at the right ankle   and right metatarsal levels. PVR waveforms are diminished amplitude of   bilateral total levels, worse on the right.    IMPRESSION:    Limited study due to noncompressible vasculature.    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 Proximal disease is not definitively   excluded due to noncompressible vasculature. Correlate with arterial   duplex ultrasound as clinically warranted.

## 2025-01-23 NOTE — PROGRESS NOTE ADULT - ASSESSMENT
69M presents for left foot wound to dermis with cellulitis  - Pt seen and evaluated  - Afebrile, no leukocytosis   - Left foot dorsal midfoot wound to dermis with improving erythema extending from the base of digits 2-4 to the proximal midfoot, no drainage, no malodor, improvement of erythema with elevation. Right foot no open wounds, no acute signs of infection.  - Left foot X-ray read: no OM, no gas  - Left foot wound culture: no growth  - ID recs appreciated   - Vasc recs, appreciated   - Pt is stable for discharge from podiatry standpoint pending final ID recs  - Wound care instruction and follow up information in discharge note provider   - Seen with attending

## 2025-01-23 NOTE — PROGRESS NOTE ADULT - ASSESSMENT
68 y/o male with hx of CAD s/p multiple  stents, HFrEF (EF~50% as of 11/26/23), A-fib, HTN, HLD, T2DM, CKD3, former smoke      Severe PAD:  Disease found in the proximal PT and proximal peroneal artery just distal to TP trunk bifurcates. Stents in the PT and peroneal diseased segments.  Discussed withVascular Dr. Tripp : started Pletal today and will restart Xarelto tomorrow        .Acute On chronic Systolic  CHF  Improving with diuresis  O2 sat on exertion 94%  Continued diuretics since patient received contrast.. gentle hydration and monitor for fluid overload    ·  Problem: Cellulitis of left foot.   Complete antibiotics per ID      ·  Problem: Diabetes.   Continue insulin        ·  Problem: CAD S/P percutaneous coronary angioplasty.   Continue cardiac meds follow up with cardiology      ·  Problem: Chronic atrial fibrillation.   ·  Plan: Patient w/ known hx of Afib but ekg consistent w/ Atrial flutter on admission    c/w home Toprol 50 QD and Xarelto 15 QD  -f/u w/ EP outpatient for A-flutter.

## 2025-01-23 NOTE — PROGRESS NOTE ADULT - SUBJECTIVE AND OBJECTIVE BOX
SUBJECTIVE:  No CP no SOB  	  MEDICATIONS:  isosorbide   dinitrate Tablet (ISORDIL) 10 milliGRAM(s) Oral three times a day  metoprolol succinate ER 50 milliGRAM(s) Oral daily  spironolactone 50 milliGRAM(s) Oral daily    amoxicillin  875 milliGRAM(s)/clavulanate 1 Tablet(s) Oral every 12 hours      acetaminophen     Tablet .. 650 milliGRAM(s) Oral every 6 hours PRN      dextrose 50% Injectable 25 Gram(s) IV Push once  dextrose 50% Injectable 12.5 Gram(s) IV Push once  dextrose 50% Injectable 25 Gram(s) IV Push once  dextrose Oral Gel 15 Gram(s) Oral once  glucagon  Injectable 1 milliGRAM(s) IntraMuscular once  insulin glargine Injectable (LANTUS) 10 Unit(s) SubCutaneous at bedtime  insulin lispro (ADMELOG) corrective regimen sliding scale   SubCutaneous three times a day before meals  insulin lispro (ADMELOG) corrective regimen sliding scale   SubCutaneous at bedtime  insulin lispro Injectable (ADMELOG) 11 Unit(s) SubCutaneous before breakfast  insulin lispro Injectable (ADMELOG) 11 Unit(s) SubCutaneous before lunch  insulin lispro Injectable (ADMELOG) 11 Unit(s) SubCutaneous before dinner    clopidogrel Tablet 75 milliGRAM(s) Oral daily  dextrose 5%. 1000 milliLiter(s) IV Continuous <Continuous>  dextrose 5%. 1000 milliLiter(s) IV Continuous <Continuous>  influenza  Vaccine (HIGH DOSE) 0.5 milliLiter(s) IntraMuscular once  lactated ringers. 1000 milliLiter(s) IV Continuous <Continuous>  mupirocin 2% Nasal 1 Application(s) Both Nostrils two times a day  mupirocin 2% Ointment 1 Application(s) Topical <User Schedule>  sodium bicarbonate 650 milliGRAM(s) Oral three times a day  sodium chloride 0.9%. 250 milliLiter(s) IV Continuous <Continuous>      REVIEW OF SYSTEMS:    CONSTITUTIONAL: No fever, weight loss, or fatigue  EYES: No eye pain, visual disturbances, or discharge  NECK: No pain or stiffness  RESPIRATORY: No cough, wheezing, chills or hemoptysis; No Shortness of Breath  CARDIOVASCULAR: No chest pain, palpitations, dizziness, or leg swelling  GASTROINTESTINAL: No abdominal or epigastric pain. No nausea, vomiting, or hematemesis; No diarrhea or constipation. No melena or hematochezia.  GENITOURINARY: No dysuria, frequency, hematuria, or incontinence  NEUROLOGICAL: No headaches, memory loss, loss of strength, numbness, or tremors  SKIN: No itching, burning, rashes, or lesions   LYMPH Nodes: No enlarged glands  MUSCULOSKELETAL: No joint pain or swelling; No muscle, back, or extremity pain  All other review of systems are negative.  	  [ ] Unable to obtain    PHYSICAL EXAM:  T(C): 36.6 (01-23-25 @ 04:16), Max: 36.9 (01-22-25 @ 11:01)  HR: 77 (01-23-25 @ 04:16) (73 - 85)  BP: 161/95 (01-23-25 @ 04:16) (152/85 - 171/84)  RR: 18 (01-23-25 @ 04:16) (18 - 18)  SpO2: 99% (01-23-25 @ 04:16) (93% - 99%)  Wt(kg): --  I&O's Summary    22 Jan 2025 07:01  -  23 Jan 2025 07:00  --------------------------------------------------------  IN: 480 mL / OUT: 0 mL / NET: 480 mL          PHYSICAL EXAM    Appearance: Normal	  HEENT:   Normal oral mucosa, PERRL, EOMI	  NECK: Soft and supple, No LAD, No JVD  Cardiovascular: Irregular Rate and Rhythm, Normal S1 S2, II/VI THELMA  Respiratory: Lungs clear to auscultation	  Gastrointestinal:  Soft, Non-tender, + BS	  Skin: No rashes, No ecchymoses, No cyanosis  Neurologic: Non-focal  Extremities: dressing in place    LABS:	 	    CARDIAC MARKERS:                                  14.9   9.54  )-----------( 219      ( 23 Jan 2025 05:20 )             45.8     01-23    135  |  100  |  51[H]  ----------------------------<  218[H]  4.0   |  22  |  2.02[H]    Ca    9.4      23 Jan 2025 05:20  Phos  4.1     01-22  Mg     2.4     01-22    TPro  6.1  /  Alb  3.7  /  TBili  1.1  /  DBili  x   /  AST  23  /  ALT  23  /  AlkPhos  156[H]  01-22

## 2025-01-23 NOTE — PROGRESS NOTE ADULT - ASSESSMENT
68 y/o male with hx of CAD s/p multiple  stents, HFrEF (EF~50% as of 11/26/23), A-fib, HTN, HLD, T2DM, CKD3, former smoke a/w BLe pain found to have hypokalemia and US : There is bilateral lower extremity arterial vascular disease, more severe on the right.  - SOB improved with diuretic  - abx for foot infection  - seen by Podiatry  - agree with close observation and inpt ABX  - creat 2.02 today  - given improvement in breathing and new BARRY would hold off on cardiac CATH at present  - Troponins 376 and 310 likely demand ischemia coupled with renal insufficiency Review of past Troponins always high on admission Last 6/23/24 606- presentation not consistent with ACS   - Vascular eval - known to Dr Calle - for angio today. Optimized from CV standpoint to proceed with Angio +/- PTA. D/w Nephrology to follow up  - d/w pt in detail

## 2025-01-23 NOTE — CHART NOTE - NSCHARTNOTEFT_GEN_A_CORE
Patient is a 69y old  Male who presents with a chief complaint of left foot infection (23 Jan 2025 10:53)    Pt is S/P LLE angio, PT and peroneal stent placement, mynx closure.     Patient reports feeling well, no complaints. Has been transferred to the floor postoperatively.       Vital Signs Last 24 Hrs  T(C): 36.9 (23 Jan 2025 18:14), Max: 36.9 (22 Jan 2025 20:11)  T(F): 98.5 (23 Jan 2025 18:14), Max: 98.5 (23 Jan 2025 18:14)  HR: 75 (23 Jan 2025 18:14) (69 - 85)  BP: 145/92 (23 Jan 2025 18:14) (129/71 - 171/84)  BP(mean): 98 (23 Jan 2025 16:15) (92 - 120)  RR: 16 (23 Jan 2025 18:14) (14 - 18)  SpO2: 99% (23 Jan 2025 18:14) (95% - 100%)    Parameters below as of 23 Jan 2025 16:15  Patient On (Oxygen Delivery Method): room air      I&O's Detail    22 Jan 2025 07:01  -  23 Jan 2025 07:00  --------------------------------------------------------  IN:    Oral Fluid: 480 mL  Total IN: 480 mL    OUT:    Voided (mL): 0 mL  Total OUT: 0 mL    Total NET: 480 mL      23 Jan 2025 07:01  -  23 Jan 2025 18:15  --------------------------------------------------------  IN:    sodium chloride 0.9%: 150 mL  Total IN: 150 mL    OUT:  Total OUT: 0 mL    Total NET: 150 mL        amoxicillin  875 milliGRAM(s)/clavulanate 1  amoxicillin  875 milliGRAM(s)/clavulanate 1  cilostazol 100  isosorbide   dinitrate Tablet (ISORDIL) 10  metoprolol succinate ER 50  spironolactone 50    PAST MEDICAL & SURGICAL HISTORY:  Former smoker      CAD in native artery      Type 2 diabetes mellitus      Hyperlipidemia, unspecified hyperlipidemia type      Essential hypertension, hypertension with unspecified goal      Afib      Hematoma of leg      Stented coronary artery      CHF (congestive heart failure)      s/p Carotid Endarterectomy  left        Physical Exam:  General: NAD, resting comfortably in bed  Pulmonary: Nonlabored breathing, no respiratory distress  Cardiovascular: NSR  Abdominal: soft, NT/ND  Lower extremities:    --> L motor and sensory intact. Foot is warm, appears well perfused. Foot is wrapped in bill dressing.   Groin: Pressure dressing over R groin. No complaints of pain/tenderness to palpation or with pressure dressing.   Pulses:   Right:                                                                          Left:                                                                                      FEM [x]2+ [ ]1+ [ ]doppler                                            DP [ ]2+ [ ]1+ [x]doppler                                                DP [ ]2+ [ ]1+ [x]doppler  PT[ ]2+ [ ]1+ [x]doppler                                                  PT [ ]2+ [ ]1+ [x]doppler      LABS:                        14.9   9.54  )-----------( 219      ( 23 Jan 2025 05:20 )             45.8     01-23    135  |  100  |  51[H]  ----------------------------<  218[H]  4.0   |  22  |  2.02[H]    Ca    9.4      23 Jan 2025 05:20  Phos  4.1     01-22  Mg     2.4     01-22    TPro  6.1  /  Alb  3.7  /  TBili  1.1  /  DBili  x   /  AST  23  /  ALT  23  /  AlkPhos  156[H]  01-22    PT/INR - ( 23 Jan 2025 05:20 )   PT: 23.5 sec;   INR: 2.08 ratio         PTT - ( 23 Jan 2025 05:20 )  PTT:41.9 sec  CAPILLARY BLOOD GLUCOSE      POCT Blood Glucose.: 149 mg/dL (23 Jan 2025 15:07)  POCT Blood Glucose.: 173 mg/dL (23 Jan 2025 07:30)  POCT Blood Glucose.: 142 mg/dL (22 Jan 2025 21:56)      Radiology and Additional Studies:    Assessment:[id number]:  70 y/o male with hx of CAD s/p 10 stents, HFrEF (EF~45% as of 11/26/23), A-fib on xarelto, HTN, HLD, T2DM on insulin, CKD3 pw SOB/CLEARY for past few weeks and L foot wound.     The patient had a scratch on the sole of his left foot 1 week ago and which opened up. He went to his pcp and was sent into the ED by his PCP. He denies any fevers or chills but reports increasing redness and warmth of his leg.   He has also had increasing dyspnea on mild exertion over the past couple weeks and gets winded even with going up a flight of stairs. At the ED the patient's VSS on RA. He was admitted for further workup of dyspnea on exertion and management of his left foot wound.    (18 Jan 2025 05:11)  S/P LLE angio, PT and peroneal stent placement, mynx closure.     Plan:  - Flat 6hrs postop  - Start pletal 100 BID today  - Will start plavix on POD#1  - Keep IVFs until tmrw  - Diet: regular  - Remainder of care per primary team     Vascular surgery  [id number]

## 2025-01-23 NOTE — PROVIDER CONTACT NOTE (CHANGE IN STATUS NOTIFICATION) - ASSESSMENT
/112, HR 86, Resps WNL, o2 sat 97% RA, afebrile. Left chest pain and epigastric pain 5/10 that does not radiate. Pt states he consumed 2 dinner plates of pasta with red sauce right before bed.

## 2025-01-24 LAB
ANION GAP SERPL CALC-SCNC: 13 MMOL/L — SIGNIFICANT CHANGE UP (ref 5–17)
BUN SERPL-MCNC: 55 MG/DL — HIGH (ref 7–23)
CALCIUM SERPL-MCNC: 9.2 MG/DL — SIGNIFICANT CHANGE UP (ref 8.4–10.5)
CHLORIDE SERPL-SCNC: 98 MMOL/L — SIGNIFICANT CHANGE UP (ref 96–108)
CK MB BLD-MCNC: 10.1 % — HIGH (ref 0–3.5)
CK MB BLD-MCNC: 8.5 % — HIGH (ref 0–3.5)
CK MB CFR SERPL CALC: 15.9 NG/ML — HIGH (ref 0–6.7)
CK MB CFR SERPL CALC: 18.3 NG/ML — HIGH (ref 0–6.7)
CK SERPL-CCNC: 181 U/L — SIGNIFICANT CHANGE UP (ref 30–200)
CK SERPL-CCNC: 186 U/L — SIGNIFICANT CHANGE UP (ref 30–200)
CO2 SERPL-SCNC: 22 MMOL/L — SIGNIFICANT CHANGE UP (ref 22–31)
CREAT SERPL-MCNC: 2.24 MG/DL — HIGH (ref 0.5–1.3)
EGFR: 31 ML/MIN/1.73M2 — LOW
GLUCOSE BLDC GLUCOMTR-MCNC: 285 MG/DL — HIGH (ref 70–99)
GLUCOSE BLDC GLUCOMTR-MCNC: 333 MG/DL — HIGH (ref 70–99)
GLUCOSE BLDC GLUCOMTR-MCNC: 347 MG/DL — HIGH (ref 70–99)
GLUCOSE BLDC GLUCOMTR-MCNC: 371 MG/DL — HIGH (ref 70–99)
GLUCOSE SERPL-MCNC: 443 MG/DL — HIGH (ref 70–99)
POTASSIUM SERPL-MCNC: 5.4 MMOL/L — HIGH (ref 3.5–5.3)
POTASSIUM SERPL-SCNC: 5.4 MMOL/L — HIGH (ref 3.5–5.3)
SODIUM SERPL-SCNC: 133 MMOL/L — LOW (ref 135–145)
TROPONIN T, HIGH SENSITIVITY RESULT: 282 NG/L — HIGH (ref 0–51)
TROPONIN T, HIGH SENSITIVITY RESULT: 290 NG/L — HIGH (ref 0–51)

## 2025-01-24 PROCEDURE — 71045 X-RAY EXAM CHEST 1 VIEW: CPT | Mod: 26

## 2025-01-24 PROCEDURE — 99232 SBSQ HOSP IP/OBS MODERATE 35: CPT

## 2025-01-24 RX ORDER — PENTOXIFYLLINE 100 %
400 POWDER (GRAM) MISCELLANEOUS
Refills: 0 | Status: DISCONTINUED | OUTPATIENT
Start: 2025-01-24 | End: 2025-01-27

## 2025-01-24 RX ORDER — SODIUM CHLORIDE 0.4 %
1 AEROSOL, SPRAY (ML) NASAL
Refills: 0 | Status: DISCONTINUED | OUTPATIENT
Start: 2025-01-24 | End: 2025-01-27

## 2025-01-24 RX ORDER — METOPROLOL SUCCINATE 25 MG
75 TABLET, EXTENDED RELEASE 24 HR ORAL DAILY
Refills: 0 | Status: DISCONTINUED | OUTPATIENT
Start: 2025-01-24 | End: 2025-01-27

## 2025-01-24 RX ORDER — RIVAROXABAN 20 MG/1
15 TABLET, FILM COATED ORAL
Refills: 0 | Status: DISCONTINUED | OUTPATIENT
Start: 2025-01-24 | End: 2025-01-27

## 2025-01-24 RX ORDER — ACETAMINOPHEN 160 MG/5ML
1000 SUSPENSION ORAL ONCE
Refills: 0 | Status: DISCONTINUED | OUTPATIENT
Start: 2025-01-24 | End: 2025-01-27

## 2025-01-24 RX ORDER — PANTOPRAZOLE 20 MG/1
40 TABLET, DELAYED RELEASE ORAL ONCE
Refills: 0 | Status: COMPLETED | OUTPATIENT
Start: 2025-01-24 | End: 2025-01-24

## 2025-01-24 RX ORDER — NITROGLYCERIN 0.4 MG/1
0.4 TABLET SUBLINGUAL ONCE
Refills: 0 | Status: COMPLETED | OUTPATIENT
Start: 2025-01-24 | End: 2025-01-24

## 2025-01-24 RX ADMIN — Medication 4: at 17:18

## 2025-01-24 RX ADMIN — Medication 4: at 12:58

## 2025-01-24 RX ADMIN — ISOSORBIDE DINITRATE 10 MILLIGRAM(S): 40 TABLET ORAL at 23:39

## 2025-01-24 RX ADMIN — SODIUM BICARBONATE 650 MILLIGRAM(S): 42 INJECTION, SOLUTION INTRAVENOUS at 13:01

## 2025-01-24 RX ADMIN — MUPIROCIN 1 APPLICATION(S): 2 CREAM TOPICAL at 05:38

## 2025-01-24 RX ADMIN — ACETAMINOPHEN 975 MILLIGRAM(S): 160 SUSPENSION ORAL at 12:59

## 2025-01-24 RX ADMIN — ACETAMINOPHEN 975 MILLIGRAM(S): 160 SUSPENSION ORAL at 05:36

## 2025-01-24 RX ADMIN — SODIUM BICARBONATE 650 MILLIGRAM(S): 42 INJECTION, SOLUTION INTRAVENOUS at 22:03

## 2025-01-24 RX ADMIN — Medication 11 UNIT(S): at 08:36

## 2025-01-24 RX ADMIN — ACETAMINOPHEN 975 MILLIGRAM(S): 160 SUSPENSION ORAL at 06:30

## 2025-01-24 RX ADMIN — Medication 75 MILLIGRAM(S): at 17:19

## 2025-01-24 RX ADMIN — AMOXICILLIN AND CLAVULANATE POTASSIUM 1 TABLET(S): 200; 28.5 POWDER, FOR SUSPENSION ORAL at 20:25

## 2025-01-24 RX ADMIN — Medication 11 UNIT(S): at 17:18

## 2025-01-24 RX ADMIN — ACETAMINOPHEN 975 MILLIGRAM(S): 160 SUSPENSION ORAL at 23:39

## 2025-01-24 RX ADMIN — Medication 5: at 08:36

## 2025-01-24 RX ADMIN — Medication 50 MILLIGRAM(S): at 05:37

## 2025-01-24 RX ADMIN — INSULIN GLARGINE-YFGN 10 UNIT(S): 100 INJECTION, SOLUTION SUBCUTANEOUS at 22:04

## 2025-01-24 RX ADMIN — Medication 50 MILLILITER(S): at 08:40

## 2025-01-24 RX ADMIN — ISOSORBIDE DINITRATE 10 MILLIGRAM(S): 40 TABLET ORAL at 17:11

## 2025-01-24 RX ADMIN — RIVAROXABAN 15 MILLIGRAM(S): 20 TABLET, FILM COATED ORAL at 17:20

## 2025-01-24 RX ADMIN — PANTOPRAZOLE 40 MILLIGRAM(S): 20 TABLET, DELAYED RELEASE ORAL at 01:45

## 2025-01-24 RX ADMIN — Medication 50 MILLIGRAM(S): at 05:36

## 2025-01-24 RX ADMIN — ACETAMINOPHEN 975 MILLIGRAM(S): 160 SUSPENSION ORAL at 17:21

## 2025-01-24 RX ADMIN — AMOXICILLIN AND CLAVULANATE POTASSIUM 1 TABLET(S): 200; 28.5 POWDER, FOR SUSPENSION ORAL at 08:36

## 2025-01-24 RX ADMIN — Medication 1: at 22:03

## 2025-01-24 RX ADMIN — ISOSORBIDE DINITRATE 10 MILLIGRAM(S): 40 TABLET ORAL at 08:37

## 2025-01-24 RX ADMIN — SODIUM BICARBONATE 650 MILLIGRAM(S): 42 INJECTION, SOLUTION INTRAVENOUS at 05:36

## 2025-01-24 RX ADMIN — Medication 40 MILLIGRAM(S): at 08:43

## 2025-01-24 RX ADMIN — Medication 400 MILLIGRAM(S): at 18:43

## 2025-01-24 RX ADMIN — MUPIROCIN 1 APPLICATION(S): 2 CREAM TOPICAL at 17:19

## 2025-01-24 RX ADMIN — Medication 100 MILLIGRAM(S): at 22:09

## 2025-01-24 RX ADMIN — Medication 11 UNIT(S): at 12:58

## 2025-01-24 RX ADMIN — Medication 100 MILLIGRAM(S): at 05:38

## 2025-01-24 RX ADMIN — ACETAMINOPHEN 975 MILLIGRAM(S): 160 SUSPENSION ORAL at 00:00

## 2025-01-24 RX ADMIN — NITROGLYCERIN 0.4 MILLIGRAM(S): 0.4 TABLET SUBLINGUAL at 01:58

## 2025-01-24 NOTE — PROGRESS NOTE ADULT - SUBJECTIVE AND OBJECTIVE BOX
Patient is S/P angioplasty and stent placement to the PT and peroneal arteries  Started on Pletal 100 mg bid  Last night had an episode of chest pain after eating a large amount of pasta similar to his usual anginal symptoms  The discomfort was quickly relieved with SL NTG.  He has 1-2 episodes a week of similar chest pain quickly relieved with SL NTG  /86  HR 94  Lungs clear  Irregular rhythm  No edema    BUN 55  Crt 2.24   Troponin 290  282    181    Echo EF 40-45%  mild AI  est PA 49 mm Hg    Imp:  Patient has known severe inoperable and non intervenable CAD with an anginal episode yesterday evening  A coronary angiogram would be of very limited benefit and would likely significantly harm his kidneys   The elevated Troponin may be related to CKD and not a NSTEMI  Rec:  medical therapy for CAD   Increase Metoprolol Succ to 75 mg qd  Can also increase the Isordil to 20 mg tid  WOULD STOP PLETAL DUE TO PRIOR HF AND REDUCED EF

## 2025-01-24 NOTE — PROGRESS NOTE ADULT - SUBJECTIVE AND OBJECTIVE BOX
Overnight events noted - 5/10 chest pain - improved with SLNTG      VITAL:  T(C): , Max: 36.9 (01-23-25 @ 18:14)  T(F): , Max: 98.5 (01-23-25 @ 18:14)  HR: 94 (01-24-25 @ 04:57)  BP: 142/86 (01-24-25 @ 04:57)  BP(mean): 98 (01-23-25 @ 16:15)  RR: 18 (01-24-25 @ 04:57)  SpO2: 96% (01-24-25 @ 04:57)  Wt(kg): --      PHYSICAL EXAM:  Constitutional: NAD, Alert  HEENT: NCAT, MMM  Neck: Supple, No JVD  Respiratory: CTA-b/l  Cardiovascular: reg s1s2  Gastrointestinal: BS+, soft, NT/ND  Extremities: No peripheral edema b/l  Neurological: no focal deficits; strength grossly intact  Back: no CVAT b/l  Skin: No rashes, no nevi    LABS:                        14.9   9.54  )-----------( 219      ( 23 Jan 2025 05:20 )             45.8     Na(133)/K(5.4)/Cl(98)/HCO3(22)/BUN(55)/Cr(2.24)Glu(443)/Ca(9.2)/Mg(--)/PO4(--)    01-24 @ 06:46  Na(135)/K(4.0)/Cl(100)/HCO3(22)/BUN(51)/Cr(2.02)Glu(218)/Ca(9.4)/Mg(--)/PO4(--)    01-23 @ 05:20  Na(136)/K(4.0)/Cl(101)/HCO3(21)/BUN(51)/Cr(1.97)Glu(137)/Ca(9.1)/Mg(2.4)/PO4(4.1)    01-22 @ 06:33        IMPRESSION: 69M w/ HTN, DM2, AFib, CAD-10 stents, HFrEF, and CKD, 1/18/25 p/w L foot wound and acute on chronic HFrEF    (1)CKD - 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 At/near baseline    (2)Metabolic acidosis - controlled, on PO NaHCO3    (3)Hyperkalemia - surprising; his K+ levels usually run low, if anything. For the time being, we can hold Spironolactone...we can add it back once his K+ level trends below 5. Worthwhile that we add back his standing Lasix (40mg po qd), both for the sake of volume management and to help control the hyperkalemia.    (4)ID - L foot wound infection - on PO Augmentin    (5)Vasc - s/p LE angio Thurs 1/23    (6)Chest pain - improved with SLNTG      RECOMMEND:  (1)D/C Spironolactone for now; can restart once K+ trends below 5  (2)Add back Lasix 40mg po daily  (3)BMP daily while admitted  (4)Further workup of chest pain per primary team/Cardiology          Markell Walton MD  [facility]  Office/on call physician: [phone]  Cell (7a-7o): [phone]       Overnight events noted - 5/10 chest pain - improved with SLNTG      VITAL:  T(C): , Max: 36.9 (01-23-25 @ 18:14)  T(F): , Max: 98.5 (01-23-25 @ 18:14)  HR: 94 (01-24-25 @ 04:57)  BP: 142/86 (01-24-25 @ 04:57)  BP(mean): 98 (01-23-25 @ 16:15)  RR: 18 (01-24-25 @ 04:57)  SpO2: 96% (01-24-25 @ 04:57)  Wt(kg): --      PHYSICAL EXAM:  Constitutional: NAD, Alert  HEENT: NCAT, MMM  Neck: Supple, No JVD  Respiratory: CTA-b/l  Cardiovascular: reg s1s2  Gastrointestinal: BS+, soft, NT/ND  Extremities: No peripheral edema b/l  Neurological: no focal deficits; strength grossly intact  Back: no CVAT b/l  Skin: No rashes, no nevi    LABS:                        14.9   9.54  )-----------( 219      ( 23 Jan 2025 05:20 )             45.8     Na(133)/K(5.4)/Cl(98)/HCO3(22)/BUN(55)/Cr(2.24)Glu(443)/Ca(9.2)/Mg(--)/PO4(--)    01-24 @ 06:46  Na(135)/K(4.0)/Cl(100)/HCO3(22)/BUN(51)/Cr(2.02)Glu(218)/Ca(9.4)/Mg(--)/PO4(--)    01-23 @ 05:20  Na(136)/K(4.0)/Cl(101)/HCO3(21)/BUN(51)/Cr(1.97)Glu(137)/Ca(9.1)/Mg(2.4)/PO4(4.1)    01-22 @ 06:33        IMPRESSION: 69M w/ HTN, DM2, AFib, CAD-10 stents, HFrEF, and CKD, 1/18/25 p/w L foot wound and acute on chronic HFrEF    (1)Renal - 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 Slight uptick in creatinine from today; mild to point where I would not yet consider this to qualify as DENNIS.    (2)Metabolic acidosis - controlled, on PO NaHCO3    (3)Hyperkalemia - surprising; his K+ levels usually run low, if anything. For the time being, we can hold Spironolactone...we can add it back once his K+ level trends below 5. Worthwhile that we add back his standing Lasix (40mg po qd), both for the sake of volume management and to help control the hyperkalemia.    (4)ID - L foot wound infection - on PO Augmentin    (5)Vasc - s/p LE angio Thurs 1/23    (6)Chest pain - improved with SLNTG      RECOMMEND:  (1)D/C Spironolactone for now; can restart once K+ trends below 5  (2)Add back Lasix 40mg po daily  (3)BMP daily while admitted  (4)Further workup of chest pain per primary team/Cardiology          Markell Walton MD  [facility]  Office/on call physician: [phone]  Cell (7a-7p): [phone]

## 2025-01-24 NOTE — PROGRESS NOTE ADULT - ASSESSMENT
69M PMH DMII, CAD s/p stent, HLD, HTN who presented with left foot wound. INR elevated this AM to 2.08.     Recommendations:  - continue  xarelto  recommend pletal rx  if pt is not a candidate from cardiac standpoint then recommend  trental 400 tid  - Continue  abx rx  - Continue with local wound care.  - Vascular following

## 2025-01-24 NOTE — CHART NOTE - NSCHARTNOTEFT_GEN_A_CORE
Patient complained of cough, patient also with small amount of blood to nares    Patient reports that he has a worsening cough since yesterday, denies sob, fevers, chills chest pain, palpitations, HA, n/v  RVP panel ordered along with chest x-ray  Will RN performed nasal swab to obtain the RVP, the swab was blood tinged. Patient states that since he has started Xarelto he has minor chronic nose bleeding, there is no active bleeding at this time.        Vital Signs Last 24 Hrs  T(C): 36.9 (24 Jan 2025 23:32), Max: 36.9 (24 Jan 2025 23:32)  T(F): 98.4 (24 Jan 2025 23:32), Max: 98.4 (24 Jan 2025 23:32)  HR: 97 (24 Jan 2025 23:32) (82 - 97)  BP: 157/85 (24 Jan 2025 23:32) (125/65 - 158/84)  BP(mean): --  RR: 18 (24 Jan 2025 23:32) (18 - 18)  SpO2: 97% (24 Jan 2025 23:32) (95% - 98%)    Parameters below as of 24 Jan 2025 23:32  Patient On (Oxygen Delivery Method): room air    Plan  RVP  chest x-ray  ocean spray    Will endorse to primary day team, attending to follow  Misti blankenship NP  [facility] [id number] Patient complained of cough, patient also with small amount of blood to nares    Patient reports that he has a worsening cough since yesterday, denies sob, fevers, chills chest pain, palpitations, HA, n/v  RVP panel ordered along with chest x-ray  Will RN performed nasal swab to obtain the RVP, the swab was blood tinged. Patient states that since he has started Xarelto he has minor chronic nose bleeding, there is no active bleeding at this time.        Vital Signs Last 24 Hrs  T(C): 36.9 (24 Jan 2025 23:32), Max: 36.9 (24 Jan 2025 23:32)  T(F): 98.4 (24 Jan 2025 23:32), Max: 98.4 (24 Jan 2025 23:32)  HR: 97 (24 Jan 2025 23:32) (82 - 97)  BP: 157/85 (24 Jan 2025 23:32) (125/65 - 158/84)  BP(mean): --  RR: 18 (24 Jan 2025 23:32) (18 - 18)  SpO2: 97% (24 Jan 2025 23:32) (95% - 98%)    Parameters below as of 24 Jan 2025 23:32  Patient On (Oxygen Delivery Method): room air    Plan  RVP  chest x-ray  ocean spray  if epistaxis worsen consider consulting ENT  Will endorse to primary day team, attending to follow  Misti blankenship NP  [facility] [id number]

## 2025-01-24 NOTE — PROGRESS NOTE ADULT - SUBJECTIVE AND OBJECTIVE BOX
Date of service: 25 @ 13:14      Patient is a 69y old  Male who presents with a chief complaint of left foot infection (2025 10:20)                                                               INTERVAL HPI/OVERNIGHT EVENTS:    REVIEW OF SYSTEMS:    no cp   no sob                                                                                                                                                                                                                                                                            Medications:  MEDICATIONS  (STANDING):  acetaminophen     Tablet .. 975 milliGRAM(s) Oral every 6 hours  acetaminophen   IVPB .. 1000 milliGRAM(s) IV Intermittent once  amoxicillin  875 milliGRAM(s)/clavulanate 1 Tablet(s) Oral every 12 hours  cilostazol 100 milliGRAM(s) Oral two times a day  dextrose 5%. 1000 milliLiter(s) (50 mL/Hr) IV Continuous <Continuous>  dextrose 5%. 1000 milliLiter(s) (100 mL/Hr) IV Continuous <Continuous>  dextrose 50% Injectable 12.5 Gram(s) IV Push once  dextrose 50% Injectable 25 Gram(s) IV Push once  dextrose 50% Injectable 25 Gram(s) IV Push once  dextrose Oral Gel 15 Gram(s) Oral once  furosemide    Tablet 40 milliGRAM(s) Oral daily  glucagon  Injectable 1 milliGRAM(s) IntraMuscular once  influenza  Vaccine (HIGH DOSE) 0.5 milliLiter(s) IntraMuscular once  insulin glargine Injectable (LANTUS) 10 Unit(s) SubCutaneous at bedtime  insulin lispro (ADMELOG) corrective regimen sliding scale   SubCutaneous three times a day before meals  insulin lispro (ADMELOG) corrective regimen sliding scale   SubCutaneous at bedtime  insulin lispro Injectable (ADMELOG) 11 Unit(s) SubCutaneous before breakfast  insulin lispro Injectable (ADMELOG) 11 Unit(s) SubCutaneous before lunch  insulin lispro Injectable (ADMELOG) 11 Unit(s) SubCutaneous before dinner  isosorbide   dinitrate Tablet (ISORDIL) 10 milliGRAM(s) Oral three times a day  metoprolol succinate ER 50 milliGRAM(s) Oral daily  mupirocin 2% Nasal 1 Application(s) Both Nostrils two times a day  mupirocin 2% Ointment 1 Application(s) Topical <User Schedule>  rivaroxaban 15 milliGRAM(s) Oral with dinner  sodium bicarbonate 650 milliGRAM(s) Oral three times a day  sodium chloride 0.9%. 250 milliLiter(s) (50 mL/Hr) IV Continuous <Continuous>    MEDICATIONS  (PRN):       Allergies    atorvastatin (Muscle Pain (Severe))    Intolerances      Vital Signs Last 24 Hrs  T(C): 36.6 (2025 11:34), Max: 36.9 (2025 18:14)  T(F): 97.9 (2025 11:34), Max: 98.5 (2025 18:14)  HR: 84 (2025 11:34) (69 - 99)  BP: 156/73 (2025 11:34) (129/71 - 178/122)  BP(mean): 98 (2025 16:15) (92 - 120)  RR: 18 (2025 11:34) (14 - 19)  SpO2: 95% (2025 11:34) (94% - 100%)    Parameters below as of 2025 11:34  Patient On (Oxygen Delivery Method): nasal cannula  O2 Flow (L/min): 2    CAPILLARY BLOOD GLUCOSE      POCT Blood Glucose.: 347 mg/dL (2025 12:42)  POCT Blood Glucose.: 371 mg/dL (2025 07:58)  POCT Blood Glucose.: 246 mg/dL (2025 21:45)  POCT Blood Glucose.: 149 mg/dL (2025 15:07)       @ 07:01  -   @ 07:00  --------------------------------------------------------  IN: 1370 mL / OUT: 1250 mL / NET: 120 mL      Physical Exam:    Daily     Daily Weight in k.9 (2025 04:57)  General:NAD  HEENT:  Nonicteric, PERRLA  CV:  RRR, S1S2   Lungs:  CTA B/L, no wheezes, rales, rhonchi  Abdomen:  Soft, non-tender, no distended, positive BS  Extremities: no edema                                                                                                                                                                                                                                                                                     LABS:                               14.9   9.54  )-----------( 219      ( 2025 05:20 )             45.8                          133[L]  |  98  |  55[H]  ----------------------------<  443[H]  5.4[H]   |  22  |  2.24[H]    Ca    9.2      2025 06:46

## 2025-01-24 NOTE — PROGRESS NOTE ADULT - ASSESSMENT
68 y/o male with hx of CAD s/p multiple  stents, HFrEF (EF~50% as of 11/26/23), A-fib, HTN, HLD, T2DM, CKD3, former smoke      Severe PAD:  Disease found in the proximal PT and proximal peroneal artery just distal to TP trunk bifurcates. Stents in the PT and peroneal diseased segments.  Discussed withVascular Dr. Tripp : started Pletal today .. cards input noted ..d/w : change to trental   and will restart Xarelto        .Acute On chronic Systolic  CHF  Improving with diuresis  O2 sat on exertion 94%  Continued diuretics since patient received contrast.. gentle hydration and monitor for fluid overload      Hyperkalemia:  holding aldactone  d/w dr. bravo     ·  Problem: Cellulitis of left foot.   Complete antibiotics per ID      ·  Problem: Diabetes.   Continue insulin      ·  Problem: CAD S/P percutaneous coronary angioplasty.   Continue cardiac meds follow up with cardiology      ·  Problem: Chronic atrial fibrillation.   ·  Plan: Patient w/ known hx of Afib but ekg consistent w/ Atrial flutter on admission  c/w home Toprol 50 QD and Xarelto 15 QD  -f/u w/ EP outpatient for A-flutter.

## 2025-01-24 NOTE — PROGRESS NOTE ADULT - PROBLEM SELECTOR PLAN 2
RAFA Calle MD have participated in the daily care and management of this patient and have personally  seen and examined the patient today and have noted the findings and formulated the plan of care.  I Tyrone Calle MD have personally seen and examined the patient at bedside today at  2 pm

## 2025-01-24 NOTE — PROGRESS NOTE ADULT - SUBJECTIVE AND OBJECTIVE BOX
Patient is a 69y old  Male who presents with a chief complaint of left foot infection (24 Jan 2025 09:15)       INTERVAL HPI/OVERNIGHT EVENTS:  Patient seen and evaluated at bedside.  Pt is resting comfortable in NAD. Denies N/V/F/C.      Allergies    atorvastatin (Muscle Pain (Severe))    Intolerances        Vital Signs Last 24 Hrs  T(C): 36.7 (24 Jan 2025 04:57), Max: 36.9 (23 Jan 2025 18:14)  T(F): 98.1 (24 Jan 2025 04:57), Max: 98.5 (23 Jan 2025 18:14)  HR: 94 (24 Jan 2025 04:57) (69 - 99)  BP: 142/86 (24 Jan 2025 04:57) (129/71 - 178/122)  BP(mean): 98 (23 Jan 2025 16:15) (92 - 120)  RR: 18 (24 Jan 2025 04:57) (14 - 19)  SpO2: 96% (24 Jan 2025 04:57) (94% - 100%)    Parameters below as of 24 Jan 2025 04:57  Patient On (Oxygen Delivery Method): nasal cannula  O2 Flow (L/min): 2      LABS:                        14.9   9.54  )-----------( 219      ( 23 Jan 2025 05:20 )             45.8     01-24    133[L]  |  98  |  55[H]  ----------------------------<  443[H]  5.4[H]   |  22  |  2.24[H]    Ca    9.2      24 Jan 2025 06:46      PT/INR - ( 23 Jan 2025 05:20 )   PT: 23.5 sec;   INR: 2.08 ratio         PTT - ( 23 Jan 2025 05:20 )  PTT:41.9 sec  Urinalysis Basic - ( 24 Jan 2025 06:46 )    Color: x / Appearance: x / SG: x / pH: x  Gluc: 443 mg/dL / Ketone: x  / Bili: x / Urobili: x   Blood: x / Protein: x / Nitrite: x   Leuk Esterase: x / RBC: x / WBC x   Sq Epi: x / Non Sq Epi: x / Bacteria: x      CAPILLARY BLOOD GLUCOSE      POCT Blood Glucose.: 371 mg/dL (24 Jan 2025 07:58)  POCT Blood Glucose.: 246 mg/dL (23 Jan 2025 21:45)  POCT Blood Glucose.: 149 mg/dL (23 Jan 2025 15:07)      Lower Extremity Physical Exam:  Vascular: DP/PT 0/4, B/L, CFT <3 seconds B/L, Temperature gradient warm to cool, B/L.   Neuro: Epicritic sensation diminished to the level of digits, B/L.  Musculoskeletal/Ortho: unremarkable  Skin: Left foot dorsal midfoot wound to dermis with improved erythema extending from the base of digits 2-4 to the proximal midfoot, no drainage, no malodor, improvement of erythema with elevation. Right foot no open wounds, no acute signs of infection.    RADIOLOGY & ADDITIONAL TESTS:

## 2025-01-24 NOTE — CHART NOTE - NSCHARTNOTEFT_GEN_A_CORE
MEDICINE NP    KRISTY COYLE  69y Male    Patient is a 69y old  Male who presents with a chief complaint of left foot infection (23 Jan 2025 18:40)       > Event Summary:  Notified by RN, Patient with c/o chest pain, /100's.  Patient examined at bedside, grimacing and reporting 5/10 discomfort, described as "pain" pointing to left chest and across epigastric area, non-radiating to arm or neck.     Patient reports pain different from prior when requiring stents, but states Nitroglycerin usually relieves this his chest pain.   Denies sob, abdominal pain, nausea, headache or dizziness.  O/N : RN reports patient just completed a large meal of pasta and red sauce prior.    Patient given NTG SL 0.4 mg x1 for complete relief of chest pain.       -Vital Signs Last 24 Hrs  T(C): 36.4 (23 Jan 2025 22:59), Max: 36.9 (23 Jan 2025 18:14)  T(F): 97.6 (23 Jan 2025 22:59), Max: 98.5 (23 Jan 2025 18:14)  HR: 85 (23 Jan 2025 23:30) (69 - 99)  BP: 166/92 (23 Jan 2025 23:30) (129/71 - 178/122)  BP(mean): 98 (23 Jan 2025 16:15) (92 - 120)  RR: 17 (23 Jan 2025 23:30) (14 - 19)  SpO2: 98% (23 Jan 2025 23:30) (94% - 100%)    Parameters below as of 23 Jan 2025 23:30  Patient On (Oxygen Delivery Method): nasal cannula    > Physical Assessment:  General: A&Ox3, grimacing in pain.   CV: +S1S2, RRR.  Aflutter on Tele.   No peripheral edema  Respiratory: Even, unlabored.  CTA B/L.    Abdomen:  +BS.  Soft, NT, ND.  No palpable mass  MSK: RAMOS x4.   Skin: Warm and Dry.  Rt. groin clean and dry.      >LABS:   (01.23.25 @ 23:49)  Troponin T, High Sensitivity Result: 282: *  CARDIAC MARKERS ( 23 Jan 2025 23:49 )  x     / x     / x     / x     / 18.3 ng/mL        > Assessment & Plan:  HPI:  68 y/o male with hx of CAD s/p 10 stents on Plavix, HFrEF (EF~45% as of 11/26/23), A-fib on Xarelto, HTN, HLD, T2DM on insulin, CKD3 pw SOB/CLEARY for past few weeks and L foot wound.  Patient had a scratch on the sole of his left foot 1 week ago and which opened up. He went to his PCP and was sent into the ED by his PCP  (18 Jan 2025 05:11).  Admitted CHF exacerbation vs fluid overload; L Foot Cellulitis/wound s/p LLE Angiogram with Stents placed today 1/23 on Pletal.  Now with c/o Chest Pain.    1. Chest Pain - r/o ACS vs GERD  -ECG : AFlutter 96bpm, no acute ST/T wave changes  -NTG SL 0.4 mg x1 given for complete relief of chest pain.     -c/w Pletal s/p LLE stent  -Maalox 30ml po x1 and keep HOB elevated  -D/w Attending, Dr. Mcdaniel, request CE and if positive follow up with Cardiology; and c/w Isordil.  -Chart review, patient missed due Isordil as he was off the floor at LLE angiogram.  Isordil 10mg po x1 now.      ADDEND:  -STAT CE - Trop flat 282 <- 310 <- 376.    -Will continue monitoring and f/u with Cardiology in AM   -Will endorse to Day Provider in AM and Attending to follow        QUINN Garcia-BC  Medicine Department  #[id number]

## 2025-01-24 NOTE — PROGRESS NOTE ADULT - PROBLEM SELECTOR PLAN 1
~2weeks hx of dyspnea on exertion limiting daily activities   Extensive cardiac hx including cad s/p 10 stents, HFrEF w/ LVEF 50% in 11/2023   ProBNP elevated to 54723, appears volume overloaded on exam  Hx consistent w/ worsening CHF vs. cardiac ischemia rather than infectious etiology   Clear lungs on CXR   Negative Flu/Cov/RSV  s/p 80mg IVP in ED     Plan:   -f/u TTE   -Supplemental O2 as needed   -f/u full RVP BCx x2  -Lasix 40mg IV BID for diuresis   -Strict I&O w/ goal net negative 1-2L   -Daily standing weights  -Cardiology consult in AM given extensive cardiac hx and worsening HF

## 2025-01-24 NOTE — PROGRESS NOTE ADULT - SUBJECTIVE AND OBJECTIVE BOX
Patient is a 69y old  Male who presents with a chief complaint of left foot infection (24 Jan 2025 13:14)      Vascular Surgery Attending Progress Note    Interval HPI: pt w/o new c/o     Medications:  acetaminophen     Tablet .. 975 milliGRAM(s) Oral every 6 hours  acetaminophen   IVPB .. 1000 milliGRAM(s) IV Intermittent once  amoxicillin  875 milliGRAM(s)/clavulanate 1 Tablet(s) Oral every 12 hours  dextrose 5%. 1000 milliLiter(s) IV Continuous <Continuous>  dextrose 5%. 1000 milliLiter(s) IV Continuous <Continuous>  dextrose 50% Injectable 12.5 Gram(s) IV Push once  dextrose 50% Injectable 25 Gram(s) IV Push once  dextrose 50% Injectable 25 Gram(s) IV Push once  dextrose Oral Gel 15 Gram(s) Oral once  furosemide    Tablet 40 milliGRAM(s) Oral daily  glucagon  Injectable 1 milliGRAM(s) IntraMuscular once  influenza  Vaccine (HIGH DOSE) 0.5 milliLiter(s) IntraMuscular once  insulin glargine Injectable (LANTUS) 10 Unit(s) SubCutaneous at bedtime  insulin lispro (ADMELOG) corrective regimen sliding scale   SubCutaneous three times a day before meals  insulin lispro (ADMELOG) corrective regimen sliding scale   SubCutaneous at bedtime  insulin lispro Injectable (ADMELOG) 11 Unit(s) SubCutaneous before breakfast  insulin lispro Injectable (ADMELOG) 11 Unit(s) SubCutaneous before lunch  insulin lispro Injectable (ADMELOG) 11 Unit(s) SubCutaneous before dinner  isosorbide   dinitrate Tablet (ISORDIL) 10 milliGRAM(s) Oral three times a day  metoprolol succinate ER 75 milliGRAM(s) Oral daily  mupirocin 2% Nasal 1 Application(s) Both Nostrils two times a day  mupirocin 2% Ointment 1 Application(s) Topical <User Schedule>  pentoxifylline 400 milliGRAM(s) Oral two times a day  rivaroxaban 15 milliGRAM(s) Oral with dinner  sodium bicarbonate 650 milliGRAM(s) Oral three times a day  sodium chloride 0.9%. 250 milliLiter(s) IV Continuous <Continuous>      Vital Signs Last 24 Hrs  T(C): 36.6 (24 Jan 2025 11:34), Max: 36.9 (23 Jan 2025 18:14)  T(F): 97.9 (24 Jan 2025 11:34), Max: 98.5 (23 Jan 2025 18:14)  HR: 84 (24 Jan 2025 11:34) (75 - 99)  BP: 156/73 (24 Jan 2025 11:34) (142/86 - 178/122)  BP(mean): 98 (23 Jan 2025 16:15) (98 - 108)  RR: 18 (24 Jan 2025 11:34) (14 - 19)  SpO2: 95% (24 Jan 2025 11:34) (94% - 99%)    Parameters below as of 24 Jan 2025 11:34  Patient On (Oxygen Delivery Method): nasal cannula  O2 Flow (L/min): 2    I&O's Summary    23 Jan 2025 07:01  -  24 Jan 2025 07:00  --------------------------------------------------------  IN: 1370 mL / OUT: 1250 mL / NET: 120 mL    24 Jan 2025 07:01  -  24 Jan 2025 15:20  --------------------------------------------------------  IN: 480 mL / OUT: 550 mL / NET: -70 mL        Physical Exam:  Neuro  A&Ox3 VSS  Vascular:  left foot warm well perfused w good to excellent cap refill and perfusion now     LABS:                        14.9   9.54  )-----------( 219      ( 23 Jan 2025 05:20 )             45.8     01-24    133[L]  |  98  |  55[H]  ----------------------------<  443[H]  5.4[H]   |  22  |  2.24[H]    Ca    9.2      24 Jan 2025 06:46      PT/INR - ( 23 Jan 2025 05:20 )   PT: 23.5 sec;   INR: 2.08 ratio         PTT - ( 23 Jan 2025 05:20 )  PTT:41.9 sec    RABIA HARPER MD  [phone] Cell [phone]

## 2025-01-24 NOTE — PROGRESS NOTE ADULT - ASSESSMENT
69M presents for left foot wound to dermis with cellulitis  - Pt seen and evaluated  - Afebrile, no leukocytosis   - Left foot dorsal midfoot wound to dermis with improved erythema extending from the base of digits 2-4 to the proximal midfoot, no drainage, no malodor, improvement of erythema with elevation. Right foot no open wounds, no acute signs of infection.  - Left foot X-ray read: no OM, no gas  - Left foot wound culture: no growth  - ID recs appreciated   - Vasc recs, appreciated   - Pt is stable for discharge from podiatry standpoint pending final ID recs  - Wound care instruction and follow up information in discharge note provider   - Discussed with attending     66

## 2025-01-25 LAB
ANION GAP SERPL CALC-SCNC: 14 MMOL/L — SIGNIFICANT CHANGE UP (ref 5–17)
BUN SERPL-MCNC: 55 MG/DL — HIGH (ref 7–23)
CALCIUM SERPL-MCNC: 9.7 MG/DL — SIGNIFICANT CHANGE UP (ref 8.4–10.5)
CHLORIDE SERPL-SCNC: 101 MMOL/L — SIGNIFICANT CHANGE UP (ref 96–108)
CO2 SERPL-SCNC: 23 MMOL/L — SIGNIFICANT CHANGE UP (ref 22–31)
CREAT SERPL-MCNC: 2.39 MG/DL — HIGH (ref 0.5–1.3)
CULTURE RESULTS: SIGNIFICANT CHANGE UP
EGFR: 29 ML/MIN/1.73M2 — LOW
FLUAV AG NPH QL: DETECTED
FLUBV AG NPH QL: SIGNIFICANT CHANGE UP
GLUCOSE BLDC GLUCOMTR-MCNC: 109 MG/DL — HIGH (ref 70–99)
GLUCOSE BLDC GLUCOMTR-MCNC: 143 MG/DL — HIGH (ref 70–99)
GLUCOSE BLDC GLUCOMTR-MCNC: 150 MG/DL — HIGH (ref 70–99)
GLUCOSE BLDC GLUCOMTR-MCNC: 223 MG/DL — HIGH (ref 70–99)
GLUCOSE SERPL-MCNC: 166 MG/DL — HIGH (ref 70–99)
POTASSIUM SERPL-MCNC: 4.4 MMOL/L — SIGNIFICANT CHANGE UP (ref 3.5–5.3)
POTASSIUM SERPL-SCNC: 4.4 MMOL/L — SIGNIFICANT CHANGE UP (ref 3.5–5.3)
RSV RNA NPH QL NAA+NON-PROBE: SIGNIFICANT CHANGE UP
SARS-COV-2 RNA SPEC QL NAA+PROBE: SIGNIFICANT CHANGE UP
SODIUM SERPL-SCNC: 138 MMOL/L — SIGNIFICANT CHANGE UP (ref 135–145)
SPECIMEN SOURCE: SIGNIFICANT CHANGE UP

## 2025-01-25 RX ORDER — ACETAMINOPHEN 160 MG/5ML
1000 SUSPENSION ORAL ONCE
Refills: 0 | Status: DISCONTINUED | OUTPATIENT
Start: 2025-01-25 | End: 2025-01-27

## 2025-01-25 RX ORDER — OSELTAMIVIR PHOSPHATE 75 MG/1
1 CAPSULE ORAL
Qty: 4 | Refills: 0
Start: 2025-01-25 | End: 2025-01-28

## 2025-01-25 RX ORDER — IPRATROPIUM BROMIDE AND ALBUTEROL SULFATE .5; 2.5 MG/3ML; MG/3ML
3 SOLUTION RESPIRATORY (INHALATION) EVERY 6 HOURS
Refills: 0 | Status: DISCONTINUED | OUTPATIENT
Start: 2025-01-25 | End: 2025-01-27

## 2025-01-25 RX ORDER — SODIUM BICARBONATE 42 MG/ML
1 INJECTION, SOLUTION INTRAVENOUS
Qty: 0 | Refills: 0 | DISCHARGE
Start: 2025-01-25

## 2025-01-25 RX ORDER — OSELTAMIVIR PHOSPHATE 75 MG/1
30 CAPSULE ORAL DAILY
Refills: 0 | Status: DISCONTINUED | OUTPATIENT
Start: 2025-01-25 | End: 2025-01-27

## 2025-01-25 RX ORDER — ISOSORBIDE DINITRATE 40 MG/1
1 TABLET ORAL
Qty: 0 | Refills: 0 | DISCHARGE
Start: 2025-01-25

## 2025-01-25 RX ORDER — RIVAROXABAN 20 MG/1
1 TABLET, FILM COATED ORAL
Qty: 0 | Refills: 0 | DISCHARGE
Start: 2025-01-25

## 2025-01-25 RX ORDER — AMOXICILLIN AND CLAVULANATE POTASSIUM 200; 28.5 MG/5ML; MG/5ML
1 POWDER, FOR SUSPENSION ORAL
Qty: 4 | Refills: 0
Start: 2025-01-25 | End: 2025-01-26

## 2025-01-25 RX ADMIN — Medication 100 MILLIGRAM(S): at 05:02

## 2025-01-25 RX ADMIN — MUPIROCIN 1 APPLICATION(S): 2 CREAM TOPICAL at 17:26

## 2025-01-25 RX ADMIN — SODIUM BICARBONATE 650 MILLIGRAM(S): 42 INJECTION, SOLUTION INTRAVENOUS at 12:38

## 2025-01-25 RX ADMIN — INSULIN GLARGINE-YFGN 10 UNIT(S): 100 INJECTION, SOLUTION SUBCUTANEOUS at 22:16

## 2025-01-25 RX ADMIN — Medication 40 MILLIGRAM(S): at 05:03

## 2025-01-25 RX ADMIN — Medication 75 MILLIGRAM(S): at 17:17

## 2025-01-25 RX ADMIN — Medication 400 MILLIGRAM(S): at 04:54

## 2025-01-25 RX ADMIN — SODIUM BICARBONATE 650 MILLIGRAM(S): 42 INJECTION, SOLUTION INTRAVENOUS at 21:27

## 2025-01-25 RX ADMIN — OSELTAMIVIR PHOSPHATE 30 MILLIGRAM(S): 75 CAPSULE ORAL at 14:38

## 2025-01-25 RX ADMIN — IPRATROPIUM BROMIDE AND ALBUTEROL SULFATE 3 MILLILITER(S): .5; 2.5 SOLUTION RESPIRATORY (INHALATION) at 22:10

## 2025-01-25 RX ADMIN — SODIUM BICARBONATE 650 MILLIGRAM(S): 42 INJECTION, SOLUTION INTRAVENOUS at 05:02

## 2025-01-25 RX ADMIN — Medication 400 MILLIGRAM(S): at 17:17

## 2025-01-25 RX ADMIN — ACETAMINOPHEN 975 MILLIGRAM(S): 160 SUSPENSION ORAL at 00:19

## 2025-01-25 RX ADMIN — ISOSORBIDE DINITRATE 10 MILLIGRAM(S): 40 TABLET ORAL at 08:25

## 2025-01-25 RX ADMIN — Medication 1 SPRAY(S): at 05:02

## 2025-01-25 RX ADMIN — Medication 11 UNIT(S): at 12:39

## 2025-01-25 RX ADMIN — Medication 11 UNIT(S): at 17:25

## 2025-01-25 RX ADMIN — MUPIROCIN 1 APPLICATION(S): 2 CREAM TOPICAL at 05:03

## 2025-01-25 RX ADMIN — AMOXICILLIN AND CLAVULANATE POTASSIUM 1 TABLET(S): 200; 28.5 POWDER, FOR SUSPENSION ORAL at 08:25

## 2025-01-25 RX ADMIN — Medication 100 MILLIGRAM(S): at 22:10

## 2025-01-25 RX ADMIN — Medication 2: at 12:39

## 2025-01-25 RX ADMIN — AMOXICILLIN AND CLAVULANATE POTASSIUM 1 TABLET(S): 200; 28.5 POWDER, FOR SUSPENSION ORAL at 21:52

## 2025-01-25 RX ADMIN — Medication 11 UNIT(S): at 08:35

## 2025-01-25 RX ADMIN — RIVAROXABAN 15 MILLIGRAM(S): 20 TABLET, FILM COATED ORAL at 17:17

## 2025-01-25 RX ADMIN — ACETAMINOPHEN 975 MILLIGRAM(S): 160 SUSPENSION ORAL at 17:17

## 2025-01-25 RX ADMIN — OSELTAMIVIR PHOSPHATE 30 MILLIGRAM(S): 75 CAPSULE ORAL at 04:53

## 2025-01-25 RX ADMIN — ISOSORBIDE DINITRATE 10 MILLIGRAM(S): 40 TABLET ORAL at 17:18

## 2025-01-25 NOTE — CHART NOTE - NSCHARTNOTESELECT_GEN_ALL_CORE
vascular surgery
Medicine NP/Event Note
Medicine NP/Event Note
Postop check/Event Note
chest pain/Event Note

## 2025-01-25 NOTE — PROGRESS NOTE ADULT - SUBJECTIVE AND OBJECTIVE BOX
SUBJECTIVE: Resting comfortably; now in isolation due to influenza A.  	  MEDICATIONS:  furosemide    Tablet 40 milliGRAM(s) Oral daily  isosorbide   dinitrate Tablet (ISORDIL) 10 milliGRAM(s) Oral three times a day  metoprolol succinate ER 75 milliGRAM(s) Oral daily  amoxicillin  875 milliGRAM(s)/clavulanate 1 Tablet(s) Oral every 12 hours  oseltamivir 30 milliGRAM(s) Oral daily  guaiFENesin Oral Liquid (Sugar-Free) 100 milliGRAM(s) Oral every 6 hours PRN  acetaminophen     Tablet .. 975 milliGRAM(s) Oral every 6 hours  acetaminophen   IVPB .. 1000 milliGRAM(s) IV Intermittent once  dextrose 50% Injectable 25 Gram(s) IV Push once  dextrose 50% Injectable 25 Gram(s) IV Push once  dextrose 50% Injectable 12.5 Gram(s) IV Push once  dextrose Oral Gel 15 Gram(s) Oral once  glucagon  Injectable 1 milliGRAM(s) IntraMuscular once  insulin glargine Injectable (LANTUS) 10 Unit(s) SubCutaneous at bedtime  insulin lispro (ADMELOG) corrective regimen sliding scale   SubCutaneous at bedtime  insulin lispro (ADMELOG) corrective regimen sliding scale   SubCutaneous three times a day before meals  insulin lispro Injectable (ADMELOG) 11 Unit(s) SubCutaneous before breakfast  insulin lispro Injectable (ADMELOG) 11 Unit(s) SubCutaneous before lunch  insulin lispro Injectable (ADMELOG) 11 Unit(s) SubCutaneous before dinner  dextrose 5%. 1000 milliLiter(s) IV Continuous <Continuous>  dextrose 5%. 1000 milliLiter(s) IV Continuous <Continuous>  influenza  Vaccine (HIGH DOSE) 0.5 milliLiter(s) IntraMuscular once  mupirocin 2% Nasal 1 Application(s) Both Nostrils two times a day  mupirocin 2% Ointment 1 Application(s) Topical <User Schedule>  pentoxifylline 400 milliGRAM(s) Oral two times a day  rivaroxaban 15 milliGRAM(s) Oral with dinner  sodium bicarbonate 650 milliGRAM(s) Oral three times a day  sodium chloride 0.65% Nasal 1 Spray(s) Both Nostrils four times a day PRN  sodium chloride 0.9%. 250 milliLiter(s) IV Continuous <Continuous>      REVIEW OF SYSTEMS:    CONSTITUTIONAL: No fever, weight loss, or fatigue  EYES: No eye pain, visual disturbances, or discharge  NECK: No pain or stiffness  RESPIRATORY: No cough, wheezing, chills or hemoptysis; No Shortness of Breath  CARDIOVASCULAR: No chest pain, palpitations, dizziness, or leg swelling  GASTROINTESTINAL: No abdominal or epigastric pain. No nausea, vomiting, or hematemesis; No diarrhea or constipation. No melena or hematochezia.  GENITOURINARY: No dysuria, frequency, hematuria, or incontinence  NEUROLOGICAL: No headaches, memory loss, loss of strength, numbness, or tremors  SKIN: No itching, burning, rashes, or lesions   LYMPH Nodes: No enlarged glands  MUSCULOSKELETAL: No joint pain or swelling; No muscle, back, or extremity pain  All other review of systems are negative.  	      PHYSICAL EXAM:  T(C): 36.9 (01-25-25 @ 04:19), Max: 36.9 (01-24-25 @ 23:32)  HR: 101 (01-25-25 @ 08:41) (84 - 101)  BP: 159/88 (01-25-25 @ 08:41) (125/65 - 159/88)  RR: 18 (01-25-25 @ 04:19) (18 - 18)  SpO2: 95% (01-25-25 @ 04:19) (95% - 98%)  Wt(kg): --  I&O's Summary    24 Jan 2025 07:01  -  25 Jan 2025 07:00  --------------------------------------------------------  IN: 800 mL / OUT: 1090 mL / NET: -290 mL          PHYSICAL EXAM    Appearance: Normal	  HEENT:   Normal oral mucosa, PERRL, EOMI	  Cardiovascular: irregular normal s1s2  Respiratory: Lungs clear to auscultation	  Extremities: No clubbing, cyanosis or edema    LABS:	 	    01-25    138  |  101  |  55[H]  ----------------------------<  166[H]  4.4   |  23  |  2.39[H]    Ca    9.7      25 Jan 2025 06:42

## 2025-01-25 NOTE — PROGRESS NOTE ADULT - ASSESSMENT
68 y/o male with hx of CAD s/p multiple  stents, HFrEF (EF~50% as of 11/26/23), A-fib, HTN, HLD, T2DM, CKD3, former smoke    Influenza :   cont med s  nebs       Severe PAD:  Disease found in the proximal PT and proximal peroneal artery just distal to TP trunk bifurcates. Stents in the PT and peroneal diseased segments.  Discussed withVascular Dr. Tripp : started Pletal today .. cards input noted ..d/w : change to trental   and will restart Xarelto        .Acute On chronic Systolic  CHF  Improving with diuresis  O2 sat on exertion 94%  Continued diuretics since patient received contrast.. gentle hydration and monitor for fluid overload      Hyperkalemia:  holding aldactone  d/w dr. bravo     ·  Problem: Cellulitis of left foot.   Complete antibiotics per ID      ·  Problem: Diabetes.   Continue insulin      ·  Problem: CAD S/P percutaneous coronary angioplasty.   Continue cardiac meds follow up with cardiology      ·  Problem: Chronic atrial fibrillation.   ·  Plan: Patient w/ known hx of Afib but ekg consistent w/ Atrial flutter on admission  c/w home Toprol 50 QD and Xarelto 15 QD  -f/u w/ EP outpatient for A-flutter.

## 2025-01-25 NOTE — PROGRESS NOTE ADULT - SUBJECTIVE AND OBJECTIVE BOX
NEPHROLOGY     Patient seen and examined resting comfortably on room air, reports feeling tired and of intermittent cough,, denies pain or sob, in no acute distress.     MEDICATIONS  (STANDING):  acetaminophen     Tablet .. 975 milliGRAM(s) Oral every 6 hours  acetaminophen   IVPB .. 1000 milliGRAM(s) IV Intermittent once  amoxicillin  875 milliGRAM(s)/clavulanate 1 Tablet(s) Oral every 12 hours  dextrose 5%. 1000 milliLiter(s) (50 mL/Hr) IV Continuous <Continuous>  dextrose 5%. 1000 milliLiter(s) (100 mL/Hr) IV Continuous <Continuous>  dextrose 50% Injectable 12.5 Gram(s) IV Push once  dextrose 50% Injectable 25 Gram(s) IV Push once  dextrose 50% Injectable 25 Gram(s) IV Push once  dextrose Oral Gel 15 Gram(s) Oral once  furosemide    Tablet 40 milliGRAM(s) Oral daily  glucagon  Injectable 1 milliGRAM(s) IntraMuscular once  influenza  Vaccine (HIGH DOSE) 0.5 milliLiter(s) IntraMuscular once  insulin glargine Injectable (LANTUS) 10 Unit(s) SubCutaneous at bedtime  insulin lispro (ADMELOG) corrective regimen sliding scale   SubCutaneous at bedtime  insulin lispro (ADMELOG) corrective regimen sliding scale   SubCutaneous three times a day before meals  insulin lispro Injectable (ADMELOG) 11 Unit(s) SubCutaneous before breakfast  insulin lispro Injectable (ADMELOG) 11 Unit(s) SubCutaneous before lunch  insulin lispro Injectable (ADMELOG) 11 Unit(s) SubCutaneous before dinner  isosorbide   dinitrate Tablet (ISORDIL) 10 milliGRAM(s) Oral three times a day  metoprolol succinate ER 75 milliGRAM(s) Oral daily  mupirocin 2% Nasal 1 Application(s) Both Nostrils two times a day  mupirocin 2% Ointment 1 Application(s) Topical <User Schedule>  oseltamivir 30 milliGRAM(s) Oral daily  pentoxifylline 400 milliGRAM(s) Oral two times a day  rivaroxaban 15 milliGRAM(s) Oral with dinner  sodium bicarbonate 650 milliGRAM(s) Oral three times a day  sodium chloride 0.9%. 250 milliLiter(s) (50 mL/Hr) IV Continuous <Continuous>    VITALS:  T(C): , Max: 37 (01-25-25 @ 11:33)  T(F): , Max: 98.6 (01-25-25 @ 11:33)  HR: 108 (01-25-25 @ 11:33)  BP: 159/83 (01-25-25 @ 11:33)  RR: 18 (01-25-25 @ 11:33)  SpO2: 91% (01-25-25 @ 11:33)    I and O's:    01-24 @ 07:01  -  01-25 @ 07:00  --------------------------------------------------------  IN: 800 mL / OUT: 1090 mL / NET: -290 mL    PHYSICAL EXAM:  Constitutional: NAD, Alert  HEENT: NCAT, MMM  Neck: Supple, No JVD  Respiratory: CTA-b/l  Cardiovascular: reg s1s2  Gastrointestinal: BS+, soft, NT/ND  Extremities: No peripheral edema b/l  Neurological: no focal deficits; strength grossly intact  Back: no CVAT b/l  Skin: No rashes, no nevi    LABS:    01-25    138  |  101  |  55[H]  ----------------------------<  166[H]  4.4   |  23  |  2.39[H]    Ca    9.7      25 Jan 2025 06:42    RADIOLOGY & ADDITIONAL STUDIES:  rad< from: Xray Chest 1 View- PORTABLE-Urgent (Xray Chest 1 View- PORTABLE-Urgent .) (01.24.25 @ 22:30) >    IMPRESSION:  Clear lungs.    --- End of Report ---          CHANNING PARRA MD; Resident Radiologist  This document has been electronically signed.  FRANKY CHRISTIANSON MD; Attending Radiologist  This document has been electronically signed. Jan 25 2025  8:55AM    < end of copied text >

## 2025-01-25 NOTE — PROGRESS NOTE ADULT - PROBLEM SELECTOR PLAN 1
~2weeks hx of dyspnea on exertion limiting daily activities   Extensive cardiac hx including cad s/p 10 stents, HFrEF w/ LVEF 50% in 11/2023   ProBNP elevated to 12380, appears volume overloaded on exam  Hx consistent w/ worsening CHF vs. cardiac ischemia rather than infectious etiology   Clear lungs on CXR   Negative Flu/Cov/RSV  s/p 80mg IVP in ED     Plan:   -f/u TTE   -Supplemental O2 as needed   -f/u full RVP BCx x2  -Lasix 40mg IV BID for diuresis   -Strict I&O w/ goal net negative 1-2L   -Daily standing weights  -Cardiology consult in AM given extensive cardiac hx and worsening HF

## 2025-01-25 NOTE — PROGRESS NOTE ADULT - ASSESSMENT
#h/o ashd s/p multiple pci, last pci dLAD 4/17/24 by Dr ILDA Dominguez, known severe inoperable and non intervenable CAD   #HTN  #DM (Dr Reyes)  #jesika on ckd (Dr Christophe Walton),bun/cr 55/2.39 without change.  #s/p cea by Dr Calle  # hfref ef last 47%  #AF on xarelto  #elevated troponin likely demand ischemia, accuracy diminished setting renal dysfunction  #Left foot infection managed by vascular and podiatry, s/p pci by Dr Calle.   #influenza A, on tamiflu  Stable at present cv perspective.

## 2025-01-25 NOTE — PROGRESS NOTE ADULT - SUBJECTIVE AND OBJECTIVE BOX
Date of service: 25 @ 22:19      Patient is a 69y old  Male who presents with a chief complaint of left foot infection (2025 19:46)                                                               INTERVAL HPI/OVERNIGHT EVENTS:    REVIEW OF SYSTEMS:    no cp   n o sob  cough   no N/v                                                                                                                                                                                                                                                                               Medications:  MEDICATIONS  (STANDING):  acetaminophen     Tablet .. 975 milliGRAM(s) Oral every 6 hours  acetaminophen   IVPB .. 1000 milliGRAM(s) IV Intermittent once  acetaminophen   IVPB .. 1000 milliGRAM(s) IV Intermittent once  amoxicillin  875 milliGRAM(s)/clavulanate 1 Tablet(s) Oral every 12 hours  dextrose 5%. 1000 milliLiter(s) (50 mL/Hr) IV Continuous <Continuous>  dextrose 5%. 1000 milliLiter(s) (100 mL/Hr) IV Continuous <Continuous>  dextrose 50% Injectable 12.5 Gram(s) IV Push once  dextrose 50% Injectable 25 Gram(s) IV Push once  dextrose 50% Injectable 25 Gram(s) IV Push once  dextrose Oral Gel 15 Gram(s) Oral once  furosemide    Tablet 40 milliGRAM(s) Oral daily  glucagon  Injectable 1 milliGRAM(s) IntraMuscular once  influenza  Vaccine (HIGH DOSE) 0.5 milliLiter(s) IntraMuscular once  insulin glargine Injectable (LANTUS) 10 Unit(s) SubCutaneous at bedtime  insulin lispro (ADMELOG) corrective regimen sliding scale   SubCutaneous three times a day before meals  insulin lispro (ADMELOG) corrective regimen sliding scale   SubCutaneous at bedtime  insulin lispro Injectable (ADMELOG) 11 Unit(s) SubCutaneous before breakfast  insulin lispro Injectable (ADMELOG) 11 Unit(s) SubCutaneous before lunch  insulin lispro Injectable (ADMELOG) 11 Unit(s) SubCutaneous before dinner  isosorbide   dinitrate Tablet (ISORDIL) 10 milliGRAM(s) Oral three times a day  metoprolol succinate ER 75 milliGRAM(s) Oral daily  mupirocin 2% Nasal 1 Application(s) Both Nostrils two times a day  mupirocin 2% Ointment 1 Application(s) Topical <User Schedule>  oseltamivir 30 milliGRAM(s) Oral daily  pentoxifylline 400 milliGRAM(s) Oral two times a day  rivaroxaban 15 milliGRAM(s) Oral with dinner  sodium bicarbonate 650 milliGRAM(s) Oral three times a day  sodium chloride 0.9%. 250 milliLiter(s) (50 mL/Hr) IV Continuous <Continuous>    MEDICATIONS  (PRN):  albuterol/ipratropium for Nebulization 3 milliLiter(s) Nebulizer every 6 hours PRN Shortness of Breath and/or Wheezing  guaiFENesin Oral Liquid (Sugar-Free) 100 milliGRAM(s) Oral every 6 hours PRN Cough  sodium chloride 0.65% Nasal 1 Spray(s) Both Nostrils four times a day PRN Nasal Congestion       Allergies    atorvastatin (Muscle Pain (Severe))    Intolerances      Vital Signs Last 24 Hrs  T(C): 37.1 (2025 20:12), Max: 37.1 (2025 20:12)  T(F): 98.8 (2025 20:12), Max: 98.8 (2025 20:12)  HR: 97 (2025 20:12) (91 - 118)  BP: 110/61 (2025 20:12) (110/61 - 164/72)  BP(mean): --  RR: 18 (2025 20:12) (18 - 18)  SpO2: 95% (2025 20:12) (91% - 97%)    Parameters below as of 2025 20:12  Patient On (Oxygen Delivery Method): room air      CAPILLARY BLOOD GLUCOSE      POCT Blood Glucose.: 150 mg/dL (2025 21:50)  POCT Blood Glucose.: 109 mg/dL (2025 16:50)  POCT Blood Glucose.: 223 mg/dL (2025 11:33)  POCT Blood Glucose.: 143 mg/dL (2025 08:24)       @ 07:  -   @ 07:00  --------------------------------------------------------  IN: 800 mL / OUT: 1090 mL / NET: -290 mL     @ 07: @ 22:19  --------------------------------------------------------  IN: 920 mL / OUT: 400 mL / NET: 520 mL      Physical Exam:    Daily     Daily Weight in k.7 (2025 04:19)  General:  NAD  HEENT:  Nonicteric, PERRLA  CV:  RRR, S1S2   Lungs: ronchi   Abdomen:  Soft, non-tender, no distended, positive BS  Extremities:  no edema                                                                                                                                                                                                                                                                                 LABS:                                138  |  101  |  55[H]  ----------------------------<  166[H]  4.4   |  23  |  2.39[H]    Ca    9.7      2025 06:42                         RADIOLOGY & ADDITIONAL TESTS         I personally reviewed: [  ]EKG   [  ]CXR    [  ] CT      A/P:         Discussed with :     Derek consultants' Notes   Time spent :

## 2025-01-25 NOTE — PROGRESS NOTE ADULT - ASSESSMENT
IMPRESSION: 69M w/ HTN, DM2, AFib, CAD-10 stents, HFrEF, and CKD, 1/18/25 p/w L foot wound and acute on chronic HFrEF    (1)Renal - 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 Slight uptick in creatinine, mild to point where would not yet consider this to qualify as DENNIS.    (2)Metabolic acidosis - controlled, on PO NaHCO3    (3)Hyperkalemia - surprising; his K+ levels usually run low, if anything. Spironolactone was on hold.. Now back on Lasix po, both for the sake of volume management and to help control the hyperkalemia.    (4)ID - L foot wound infection - on PO Augmentin    (5)Vasc - s/p LE angio Thurs 1/23    (6)Chest pain - improved with SLNTG    (7)Flu + - on Oseltamivir    RECOMMEND:  (1)Continue Lasix 40mg po daily  (2)Continue to hold spironolactone for now  (3)BMP daily while admitted  (4)Further workup of chest pain per primary team/Cardiology    D/w Dr. Juli Foy, [facility]  [phone]

## 2025-01-25 NOTE — CHART NOTE - NSCHARTNOTEFT_GEN_A_CORE
Positive for Flu A    FluA/FluB/RSV/COVID PCR (01.24.25 @ 23:45)   SARS-CoV-2 Result: [facility]: This Respiratory Panel uses polymerase chain reaction (PCR) to detect for   influenza A; influenza B; respiratory syncytial virus; and SARS-CoV-2.   Test results should be correlated with clinical presentation, patient   history, and epidemiology.  Influenza A Result: Detected  Influenza B Result: [facility]  Resp Syn Virus Result: [facility]    Patient informed of the above    Plan  Tamiflu 30 mg daily for five days  Robitussin 100mg every 6 hours as needed for cough    Will endorse to primary day team, attending to follow    Misti Martinez NP  [facility] [id number]

## 2025-01-26 LAB
ANION GAP SERPL CALC-SCNC: 14 MMOL/L — SIGNIFICANT CHANGE UP (ref 5–17)
BUN SERPL-MCNC: 52 MG/DL — HIGH (ref 7–23)
CALCIUM SERPL-MCNC: 9.3 MG/DL — SIGNIFICANT CHANGE UP (ref 8.4–10.5)
CHLORIDE SERPL-SCNC: 99 MMOL/L — SIGNIFICANT CHANGE UP (ref 96–108)
CO2 SERPL-SCNC: 23 MMOL/L — SIGNIFICANT CHANGE UP (ref 22–31)
CREAT SERPL-MCNC: 2.04 MG/DL — HIGH (ref 0.5–1.3)
EGFR: 35 ML/MIN/1.73M2 — LOW
GLUCOSE BLDC GLUCOMTR-MCNC: 138 MG/DL — HIGH (ref 70–99)
GLUCOSE BLDC GLUCOMTR-MCNC: 202 MG/DL — HIGH (ref 70–99)
GLUCOSE BLDC GLUCOMTR-MCNC: 255 MG/DL — HIGH (ref 70–99)
GLUCOSE BLDC GLUCOMTR-MCNC: 91 MG/DL — SIGNIFICANT CHANGE UP (ref 70–99)
GLUCOSE SERPL-MCNC: 165 MG/DL — HIGH (ref 70–99)
POTASSIUM SERPL-MCNC: 4.3 MMOL/L — SIGNIFICANT CHANGE UP (ref 3.5–5.3)
POTASSIUM SERPL-SCNC: 4.3 MMOL/L — SIGNIFICANT CHANGE UP (ref 3.5–5.3)
SODIUM SERPL-SCNC: 136 MMOL/L — SIGNIFICANT CHANGE UP (ref 135–145)

## 2025-01-26 RX ADMIN — SODIUM BICARBONATE 650 MILLIGRAM(S): 42 INJECTION, SOLUTION INTRAVENOUS at 06:05

## 2025-01-26 RX ADMIN — ACETAMINOPHEN 975 MILLIGRAM(S): 160 SUSPENSION ORAL at 00:55

## 2025-01-26 RX ADMIN — Medication 3: at 12:49

## 2025-01-26 RX ADMIN — ISOSORBIDE DINITRATE 10 MILLIGRAM(S): 40 TABLET ORAL at 13:43

## 2025-01-26 RX ADMIN — ISOSORBIDE DINITRATE 10 MILLIGRAM(S): 40 TABLET ORAL at 06:18

## 2025-01-26 RX ADMIN — Medication 2: at 09:00

## 2025-01-26 RX ADMIN — ACETAMINOPHEN 975 MILLIGRAM(S): 160 SUSPENSION ORAL at 00:13

## 2025-01-26 RX ADMIN — Medication 11 UNIT(S): at 12:41

## 2025-01-26 RX ADMIN — SODIUM BICARBONATE 650 MILLIGRAM(S): 42 INJECTION, SOLUTION INTRAVENOUS at 21:12

## 2025-01-26 RX ADMIN — INSULIN GLARGINE-YFGN 10 UNIT(S): 100 INJECTION, SOLUTION SUBCUTANEOUS at 21:31

## 2025-01-26 RX ADMIN — ACETAMINOPHEN 975 MILLIGRAM(S): 160 SUSPENSION ORAL at 06:05

## 2025-01-26 RX ADMIN — Medication 400 MILLIGRAM(S): at 06:05

## 2025-01-26 RX ADMIN — MUPIROCIN 1 APPLICATION(S): 2 CREAM TOPICAL at 06:18

## 2025-01-26 RX ADMIN — MUPIROCIN 1 APPLICATION(S): 2 CREAM TOPICAL at 17:23

## 2025-01-26 RX ADMIN — AMOXICILLIN AND CLAVULANATE POTASSIUM 1 TABLET(S): 200; 28.5 POWDER, FOR SUSPENSION ORAL at 20:12

## 2025-01-26 RX ADMIN — RIVAROXABAN 15 MILLIGRAM(S): 20 TABLET, FILM COATED ORAL at 17:24

## 2025-01-26 RX ADMIN — AMOXICILLIN AND CLAVULANATE POTASSIUM 1 TABLET(S): 200; 28.5 POWDER, FOR SUSPENSION ORAL at 09:00

## 2025-01-26 RX ADMIN — ACETAMINOPHEN 975 MILLIGRAM(S): 160 SUSPENSION ORAL at 17:25

## 2025-01-26 RX ADMIN — Medication 40 MILLIGRAM(S): at 06:17

## 2025-01-26 RX ADMIN — SODIUM BICARBONATE 650 MILLIGRAM(S): 42 INJECTION, SOLUTION INTRAVENOUS at 13:49

## 2025-01-26 RX ADMIN — Medication 75 MILLIGRAM(S): at 06:05

## 2025-01-26 RX ADMIN — Medication 11 UNIT(S): at 17:27

## 2025-01-26 RX ADMIN — Medication 400 MILLIGRAM(S): at 17:24

## 2025-01-26 RX ADMIN — ISOSORBIDE DINITRATE 10 MILLIGRAM(S): 40 TABLET ORAL at 00:15

## 2025-01-26 RX ADMIN — OSELTAMIVIR PHOSPHATE 30 MILLIGRAM(S): 75 CAPSULE ORAL at 12:50

## 2025-01-26 RX ADMIN — Medication 11 UNIT(S): at 09:02

## 2025-01-26 RX ADMIN — ISOSORBIDE DINITRATE 10 MILLIGRAM(S): 40 TABLET ORAL at 21:12

## 2025-01-26 NOTE — PROGRESS NOTE ADULT - PROBLEM SELECTOR PROBLEM 8
Chronic atrial fibrillation

## 2025-01-26 NOTE — PROGRESS NOTE ADULT - PROBLEM SELECTOR PROBLEM 2
Cellulitis of left foot
Cellulitis of left foot
Arterial insufficiency with ischemic ulcer
Arterial insufficiency with ischemic ulcer
Cellulitis of left foot
Arterial insufficiency with ischemic ulcer
Cellulitis of left foot

## 2025-01-26 NOTE — PROGRESS NOTE ADULT - ASSESSMENT
#h/o ashd s/p multiple pci, last pci dLAD 4/17/24 by Dr ILDA Dominguez, known severe inoperable and non intervenable CAD   #HTN  #DM (Dr Reyes)  #jesika on ckd (Dr Christophe Walton),bun/cr 55/2.39 without change.  #s/p cea by Dr Calle  # hfref ef last 47%  #AF on xarelto  #elevated troponin likely demand ischemia, accuracy diminished setting renal dysfunction  #Left foot infection managed by vascular and podiatry, s/p pci by Dr Calle, now on trental.   #influenza A, on tamiflu  Stable at present cv perspective.   Discussed with Dr Mcdaniel.

## 2025-01-26 NOTE — PROGRESS NOTE ADULT - SUBJECTIVE AND OBJECTIVE BOX
Date of service: 25 @ 19:46      Patient is a 69y old  Male who presents with a chief complaint of left foot infection (2025 22:19)                                                               INTERVAL HPI/OVERNIGHT EVENTS:    REVIEW OF SYSTEMS:     No chest pain  Improving shortness of breath  Generalized weakness and cough                                                                                                                                                                                                                                                                    Medications:  MEDICATIONS  (STANDING):  acetaminophen     Tablet .. 975 milliGRAM(s) Oral every 6 hours  acetaminophen   IVPB .. 1000 milliGRAM(s) IV Intermittent once  acetaminophen   IVPB .. 1000 milliGRAM(s) IV Intermittent once  amoxicillin  875 milliGRAM(s)/clavulanate 1 Tablet(s) Oral every 12 hours  dextrose 5%. 1000 milliLiter(s) (50 mL/Hr) IV Continuous <Continuous>  dextrose 5%. 1000 milliLiter(s) (100 mL/Hr) IV Continuous <Continuous>  dextrose 50% Injectable 12.5 Gram(s) IV Push once  dextrose 50% Injectable 25 Gram(s) IV Push once  dextrose 50% Injectable 25 Gram(s) IV Push once  dextrose Oral Gel 15 Gram(s) Oral once  furosemide    Tablet 40 milliGRAM(s) Oral daily  glucagon  Injectable 1 milliGRAM(s) IntraMuscular once  influenza  Vaccine (HIGH DOSE) 0.5 milliLiter(s) IntraMuscular once  insulin glargine Injectable (LANTUS) 10 Unit(s) SubCutaneous at bedtime  insulin lispro (ADMELOG) corrective regimen sliding scale   SubCutaneous three times a day before meals  insulin lispro (ADMELOG) corrective regimen sliding scale   SubCutaneous at bedtime  insulin lispro Injectable (ADMELOG) 11 Unit(s) SubCutaneous before breakfast  insulin lispro Injectable (ADMELOG) 11 Unit(s) SubCutaneous before lunch  insulin lispro Injectable (ADMELOG) 11 Unit(s) SubCutaneous before dinner  isosorbide   dinitrate Tablet (ISORDIL) 10 milliGRAM(s) Oral three times a day  metoprolol succinate ER 75 milliGRAM(s) Oral daily  mupirocin 2% Nasal 1 Application(s) Both Nostrils two times a day  mupirocin 2% Ointment 1 Application(s) Topical <User Schedule>  oseltamivir 30 milliGRAM(s) Oral daily  pentoxifylline 400 milliGRAM(s) Oral two times a day  rivaroxaban 15 milliGRAM(s) Oral with dinner  sodium bicarbonate 650 milliGRAM(s) Oral three times a day  sodium chloride 0.9%. 250 milliLiter(s) (50 mL/Hr) IV Continuous <Continuous>    MEDICATIONS  (PRN):  albuterol/ipratropium for Nebulization 3 milliLiter(s) Nebulizer every 6 hours PRN Shortness of Breath and/or Wheezing  guaiFENesin Oral Liquid (Sugar-Free) 100 milliGRAM(s) Oral every 6 hours PRN Cough  sodium chloride 0.65% Nasal 1 Spray(s) Both Nostrils four times a day PRN Nasal Congestion       Allergies    atorvastatin (Muscle Pain (Severe))    Intolerances      Vital Signs Last 24 Hrs  T(C): 36.8 (2025 11:03), Max: 37.1 (2025 20:12)  T(F): 98.3 (2025 11:03), Max: 98.8 (2025 20:12)  HR: 83 (2025 11:03) (83 - 99)  BP: 119/66 (2025 11:03) (110/61 - 134/73)  BP(mean): --  RR: 18 (2025 11:03) (18 - 18)  SpO2: 98% (2025 11:03) (95% - 98%)    Parameters below as of 2025 11:03  Patient On (Oxygen Delivery Method): room air      CAPILLARY BLOOD GLUCOSE      POCT Blood Glucose.: 138 mg/dL (2025 17:13)  POCT Blood Glucose.: 255 mg/dL (2025 11:45)  POCT Blood Glucose.: 202 mg/dL (2025 07:49)  POCT Blood Glucose.: 150 mg/dL (2025 21:50)       @ 07:01  -   @ 07:00  --------------------------------------------------------  IN: 920 mL / OUT: 400 mL / NET: 520 mL      Physical Exam:    Daily     Daily Weight in k.5 (2025 05:23)  General: NAD  HEENT:  Nonicteric, PERRLA  CV:  RRR, S1S2   Lungs:  Scattered wheezing bilaterally  Abdomen:  Soft, non-tender, no distended, positive BS  Extremities: No edema  Neuro:  AAOx3, non-focal, grossly intact                                                                                                                                                                                                                                                                                                LABS:                                136  |  99  |  52[H]  ----------------------------<  165[H]  4.3   |  23  |  2.04[H]    Ca    9.3      2025 13:51                         RADIOLOGY & ADDITIONAL TESTS         I personally reviewed: [  ]EKG   [  ]CXR    [  ] CT      A/P:         Discussed with :     Derek consultants' Notes   Time spent :

## 2025-01-26 NOTE — PROGRESS NOTE ADULT - PROBLEM/PLAN-3
DISPLAY PLAN FREE TEXT
No

## 2025-01-26 NOTE — PROGRESS NOTE ADULT - PROBLEM SELECTOR PLAN 7
BP within tolerable limits   c/w home isosorbide dinitrate, Toprol, aldactone, and Farxiga

## 2025-01-26 NOTE — PROGRESS NOTE ADULT - PROBLEM SELECTOR PLAN 3
On Lantus 24U QHS, 16U TIDAC and Farxiga at home  Previous admission while on 15U lantus has hypoglycemia to 39 in AM    Plan:   Lantus 10U QHS  Admelog 11U TIDAC   JOHNNY   CC DASH diet  Hold home Farxiga  Not on Statin due to severe myalgias

## 2025-01-26 NOTE — PROGRESS NOTE ADULT - ASSESSMENT
70 y/o male with hx of CAD s/p multiple  stents, HFrEF (EF~50% as of 11/26/23), A-fib, HTN, HLD, T2DM, CKD3, former smoke    Influenza :   tamiflu  nebs       Severe PAD:  Disease found in the proximal PT and proximal peroneal artery just distal to TP trunk bifurcates. Stents in the PT and peroneal diseased segments.  Discussed withVascular Dr. Tripp : started Pletal today .. cards input noted ..d/w : change to trental   and will restart Xarelto        .Acute On chronic Systolic  CHF  Improving with diuresis  O2 sat on exertion 94%  Lasix restarted      Hyperkalemia:  holding aldactone  d/w dr. bravo     ·  Problem: Cellulitis of left foot.   Complete antibiotics per ID      ·  Problem: Diabetes.   Continue insulin      ·  Problem: CAD S/P percutaneous coronary angioplasty.   Continue cardiac meds follow up with cardiology      ·  Problem: Chronic atrial fibrillation.   ·  Plan: Patient w/ known hx of Afib but ekg consistent w/ Atrial flutter on admission  c/w home Toprol 50 QD and Xarelto 15 QD  -f/u w/ EP outpatient for A-flutter.    Dispo planning

## 2025-01-26 NOTE — PROGRESS NOTE ADULT - PROBLEM SELECTOR PLAN 8
Patient w/ known hx of Afib but ekg consistent w/ Atrial flutter on admission    c/w home Toprol 50 QD and Xarelto 15 QD  -f/u w/ EP outpatient for A-flutter

## 2025-01-26 NOTE — PROGRESS NOTE ADULT - PROBLEM SELECTOR PLAN 9
DVT PPx: Xarelto  Diet: CC DASH diet   PT/OT: PT eval   Code Status: Full Code   Dispo: Pending

## 2025-01-26 NOTE — PROGRESS NOTE ADULT - SUBJECTIVE AND OBJECTIVE BOX
SUBJECTIVE: Clinically stable. Anxious for discharge.   	  MEDICATIONS:  furosemide    Tablet 40 milliGRAM(s) Oral daily  isosorbide   dinitrate Tablet (ISORDIL) 10 milliGRAM(s) Oral three times a day  metoprolol succinate ER 75 milliGRAM(s) Oral daily  amoxicillin  875 milliGRAM(s)/clavulanate 1 Tablet(s) Oral every 12 hours  oseltamivir 30 milliGRAM(s) Oral daily  albuterol/ipratropium for Nebulization 3 milliLiter(s) Nebulizer every 6 hours PRN  guaiFENesin Oral Liquid (Sugar-Free) 100 milliGRAM(s) Oral every 6 hours PRN  acetaminophen     Tablet .. 975 milliGRAM(s) Oral every 6 hours  acetaminophen   IVPB .. 1000 milliGRAM(s) IV Intermittent once  acetaminophen   IVPB .. 1000 milliGRAM(s) IV Intermittent once  dextrose 50% Injectable 12.5 Gram(s) IV Push once  dextrose 50% Injectable 25 Gram(s) IV Push once  dextrose 50% Injectable 25 Gram(s) IV Push once  dextrose Oral Gel 15 Gram(s) Oral once  glucagon  Injectable 1 milliGRAM(s) IntraMuscular once  insulin glargine Injectable (LANTUS) 10 Unit(s) SubCutaneous at bedtime  insulin lispro (ADMELOG) corrective regimen sliding scale   SubCutaneous three times a day before meals  insulin lispro (ADMELOG) corrective regimen sliding scale   SubCutaneous at bedtime  insulin lispro Injectable (ADMELOG) 11 Unit(s) SubCutaneous before breakfast  insulin lispro Injectable (ADMELOG) 11 Unit(s) SubCutaneous before lunch  insulin lispro Injectable (ADMELOG) 11 Unit(s) SubCutaneous before dinner  dextrose 5%. 1000 milliLiter(s) IV Continuous <Continuous>  dextrose 5%. 1000 milliLiter(s) IV Continuous <Continuous>  influenza  Vaccine (HIGH DOSE) 0.5 milliLiter(s) IntraMuscular once  mupirocin 2% Nasal 1 Application(s) Both Nostrils two times a day  mupirocin 2% Ointment 1 Application(s) Topical <User Schedule>  pentoxifylline 400 milliGRAM(s) Oral two times a day  rivaroxaban 15 milliGRAM(s) Oral with dinner  sodium bicarbonate 650 milliGRAM(s) Oral three times a day  sodium chloride 0.65% Nasal 1 Spray(s) Both Nostrils four times a day PRN  sodium chloride 0.9%. 250 milliLiter(s) IV Continuous <Continuous>      REVIEW OF SYSTEMS:    CONSTITUTIONAL: No fever, weight loss, or fatigue  EYES: No eye pain, visual disturbances, or discharge  NECK: No pain or stiffness  RESPIRATORY: No cough, wheezing, chills or hemoptysis; No Shortness of Breath  CARDIOVASCULAR: No chest pain, palpitations, dizziness, or leg swelling  GASTROINTESTINAL: No abdominal or epigastric pain. No nausea, vomiting, or hematemesis; No diarrhea or constipation. No melena or hematochezia.  GENITOURINARY: No dysuria, frequency, hematuria, or incontinence  NEUROLOGICAL: No headaches, memory loss, loss of strength, numbness, or tremors  SKIN: No itching, burning, rashes, or lesions   LYMPH Nodes: No enlarged glands  MUSCULOSKELETAL: No joint pain or swelling; No muscle, back, or extremity pain  All other review of systems are negative.  	    PHYSICAL EXAM:  T(C): 36.7 (01-26-25 @ 05:23), Max: 37.1 (01-25-25 @ 20:12)  HR: 87 (01-26-25 @ 05:23) (87 - 118)  BP: 128/77 (01-26-25 @ 05:23) (110/61 - 164/72)  RR: 18 (01-26-25 @ 05:23) (18 - 18)  SpO2: 95% (01-26-25 @ 05:23) (91% - 96%)  Wt(kg): --  I&O's Summary    25 Jan 2025 07:01  -  26 Jan 2025 07:00  --------------------------------------------------------  IN: 920 mL / OUT: 400 mL / NET: 520 mL          PHYSICAL EXAM    Appearance: Normal	  HEENT:   Normal oral mucosa, PERRL, EOMI	  NECK: Soft and supple, No LAD, No JVD  Cardiovascular: irregular normal s1s2  Respiratory: Lungs clear to auscultation	  Extremities: No clubbing, cyanosis or edema. Left foot bandaged.     LABS:	 	      01-25    138  |  101  |  55[H]  ----------------------------<  166[H]  4.4   |  23  |  2.39[H]    Ca    9.7      25 Jan 2025 06:42

## 2025-01-26 NOTE — PROGRESS NOTE ADULT - PROBLEM SELECTOR PROBLEM 1
Stage 2 chronic kidney disease
Dyspnea on exertion
Stage 2 chronic kidney disease
Stage 2 chronic kidney disease
Dyspnea on exertion

## 2025-01-26 NOTE — PROGRESS NOTE ADULT - PROBLEM SELECTOR PLAN 6
Cr. 1.75, appears to be at baseline compared to previous admission Cr of 2.1   Metabolic Acidosis w/ Bicarb 16     Plan:   Start Sodium Bicarbonate 650 TID for acidosis
Cr. 1.75, appears to be at baseline compared to previous admission Cr of 2.1   Metabolic Acidosis w/ Bicarb 16     Plan:   Start Sodium Bicarbonate 650 TID for acidosis    > ordered Urine studies + kidney US iso of worsening BARRY
Cr. 1.75, appears to be at baseline compared to previous admission Cr of 2.1   Metabolic Acidosis w/ Bicarb 16     Plan:   Start Sodium Bicarbonate 650 TID for acidosis
Cr. 1.75, appears to be at baseline compared to previous admission Cr of 2.1   Metabolic Acidosis w/ Bicarb 16     Plan:   Start Sodium Bicarbonate 650 TID for acidosis    > ordered Urine studies + kidney US iso of worsening BARRY

## 2025-01-26 NOTE — PROGRESS NOTE ADULT - PROBLEM SELECTOR PLAN 5
LVEF 50% in 11/2023; concern for worsening cardiac function given worsening fluid retention and dyspnea     Plan:    TTE EF 40-45%  Isosorbide dinitrate for afterload reduction, Toprol 50 QD, Aldactone 50 QD  Hold home Farxiga  c/w PO Lasix 40 BID
LVEF 50% in 11/2023; concern for worsening cardiac function given worsening fluid retention and dyspnea     Plan:   f/u TTE   Isosorbide dinitrate for afterload reduction, Toprol 50 QD, Aldactone 50 QD  Hold home Farxiga  Hold home PO Lasix 40 BID and give IV lasix 40 BID
LVEF 50% in 11/2023; concern for worsening cardiac function given worsening fluid retention and dyspnea     Plan:   f/u TTE   Isosorbide dinitrate for afterload reduction, Toprol 50 QD, Aldactone 50 QD  Hold home Farxiga  Hold home PO Lasix 40 BID and give IV lasix 40 BID
LVEF 50% in 11/2023; concern for worsening cardiac function given worsening fluid retention and dyspnea     Plan:    TTE EF 40-45%  Isosorbide dinitrate for afterload reduction, Toprol 50 QD, Aldactone 50 QD  Hold home Farxiga  c/w PO Lasix 40 BID
LVEF 50% in 11/2023; concern for worsening cardiac function given worsening fluid retention and dyspnea     Plan:   f/u TTE   Isosorbide dinitrate for afterload reduction, Toprol 50 QD, Aldactone 50 QD  Hold home Farxiga  Hold home PO Lasix 40 BID and give IV lasix 40 BID
LVEF 50% in 11/2023; concern for worsening cardiac function given worsening fluid retention and dyspnea     Plan:    TTE EF 40-45%  Isosorbide dinitrate for afterload reduction, Toprol 50 QD, Aldactone 50 QD  Hold home Farxiga  c/w PO Lasix 40 BID
LVEF 50% in 11/2023; concern for worsening cardiac function given worsening fluid retention and dyspnea     Plan:   f/u TTE   Isosorbide dinitrate for afterload reduction, Toprol 50 QD, Aldactone 50 QD  Hold home Farxiga  Hold home PO Lasix 40 BID and give IV lasix 40 BID

## 2025-01-26 NOTE — PROGRESS NOTE ADULT - PROBLEM SELECTOR PLAN 1
~2weeks hx of dyspnea on exertion limiting daily activities   Extensive cardiac hx including cad s/p 10 stents, HFrEF w/ LVEF 50% in 11/2023   ProBNP elevated to 323[address], appears volume overloaded on exam  Hx consistent w/ worsening CHF vs. cardiac ischemia rather than infectious etiology   Clear lungs on CXR   Negative Flu/Cov/RSV  s/p 80mg IVP in ED     Plan:   -f/u TTE   -Supplemental O2 as needed   -f/u full RVP BCx x2  -Lasix 40mg IV BID for diuresis   -Strict I&O w/ goal net negative 1-2L   -Daily standing weights  -Cardiology consult in AM given extensive cardiac hx and worsening HF

## 2025-01-26 NOTE — PROGRESS NOTE ADULT - PROBLEM SELECTOR PROBLEM 6
Chronic kidney disease, unspecified CKD stage

## 2025-01-27 VITALS
TEMPERATURE: 98 F | OXYGEN SATURATION: 98 % | RESPIRATION RATE: 18 BRPM | HEART RATE: 83 BPM | SYSTOLIC BLOOD PRESSURE: 166 MMHG | DIASTOLIC BLOOD PRESSURE: 80 MMHG

## 2025-01-27 LAB
GLUCOSE BLDC GLUCOMTR-MCNC: 139 MG/DL — HIGH (ref 70–99)
GLUCOSE BLDC GLUCOMTR-MCNC: 99 MG/DL — SIGNIFICANT CHANGE UP (ref 70–99)

## 2025-01-27 PROCEDURE — 71045 X-RAY EXAM CHEST 1 VIEW: CPT

## 2025-01-27 PROCEDURE — 0225U NFCT DS DNA&RNA 21 SARSCOV2: CPT

## 2025-01-27 PROCEDURE — 83605 ASSAY OF LACTIC ACID: CPT

## 2025-01-27 PROCEDURE — 93306 TTE W/DOPPLER COMPLETE: CPT

## 2025-01-27 PROCEDURE — 76770 US EXAM ABDO BACK WALL COMP: CPT

## 2025-01-27 PROCEDURE — 76000 FLUOROSCOPY <1 HR PHYS/QHP: CPT

## 2025-01-27 PROCEDURE — 82435 ASSAY OF BLOOD CHLORIDE: CPT

## 2025-01-27 PROCEDURE — 93925 LOWER EXTREMITY STUDY: CPT

## 2025-01-27 PROCEDURE — 85027 COMPLETE CBC AUTOMATED: CPT

## 2025-01-27 PROCEDURE — 93970 EXTREMITY STUDY: CPT

## 2025-01-27 PROCEDURE — 87070 CULTURE OTHR SPECIMN AEROBIC: CPT

## 2025-01-27 PROCEDURE — 85730 THROMBOPLASTIN TIME PARTIAL: CPT

## 2025-01-27 PROCEDURE — 87641 MR-STAPH DNA AMP PROBE: CPT

## 2025-01-27 PROCEDURE — C1760: CPT

## 2025-01-27 PROCEDURE — 36415 COLL VENOUS BLD VENIPUNCTURE: CPT

## 2025-01-27 PROCEDURE — 83880 ASSAY OF NATRIURETIC PEPTIDE: CPT

## 2025-01-27 PROCEDURE — 82330 ASSAY OF CALCIUM: CPT

## 2025-01-27 PROCEDURE — 84550 ASSAY OF BLOOD/URIC ACID: CPT

## 2025-01-27 PROCEDURE — 85018 HEMOGLOBIN: CPT

## 2025-01-27 PROCEDURE — 86850 RBC ANTIBODY SCREEN: CPT

## 2025-01-27 PROCEDURE — 93005 ELECTROCARDIOGRAM TRACING: CPT

## 2025-01-27 PROCEDURE — 84484 ASSAY OF TROPONIN QUANT: CPT

## 2025-01-27 PROCEDURE — 85025 COMPLETE CBC W/AUTO DIFF WBC: CPT

## 2025-01-27 PROCEDURE — 87640 STAPH A DNA AMP PROBE: CPT

## 2025-01-27 PROCEDURE — 87040 BLOOD CULTURE FOR BACTERIA: CPT

## 2025-01-27 PROCEDURE — 93923 UPR/LXTR ART STDY 3+ LVLS: CPT

## 2025-01-27 PROCEDURE — C1894: CPT

## 2025-01-27 PROCEDURE — 94640 AIRWAY INHALATION TREATMENT: CPT

## 2025-01-27 PROCEDURE — C1874: CPT

## 2025-01-27 PROCEDURE — 99285 EMERGENCY DEPT VISIT HI MDM: CPT | Mod: 25

## 2025-01-27 PROCEDURE — 85014 HEMATOCRIT: CPT

## 2025-01-27 PROCEDURE — 82570 ASSAY OF URINE CREATININE: CPT

## 2025-01-27 PROCEDURE — 83735 ASSAY OF MAGNESIUM: CPT

## 2025-01-27 PROCEDURE — 84156 ASSAY OF PROTEIN URINE: CPT

## 2025-01-27 PROCEDURE — 82962 GLUCOSE BLOOD TEST: CPT

## 2025-01-27 PROCEDURE — 87637 SARSCOV2&INF A&B&RSV AMP PRB: CPT

## 2025-01-27 PROCEDURE — 82550 ASSAY OF CK (CPK): CPT

## 2025-01-27 PROCEDURE — 80048 BASIC METABOLIC PNL TOTAL CA: CPT

## 2025-01-27 PROCEDURE — 85610 PROTHROMBIN TIME: CPT

## 2025-01-27 PROCEDURE — 86922 COMPATIBILITY TEST ANTIGLOB: CPT

## 2025-01-27 PROCEDURE — 97162 PT EVAL MOD COMPLEX 30 MIN: CPT

## 2025-01-27 PROCEDURE — 80053 COMPREHEN METABOLIC PANEL: CPT

## 2025-01-27 PROCEDURE — 96375 TX/PRO/DX INJ NEW DRUG ADDON: CPT

## 2025-01-27 PROCEDURE — 86901 BLOOD TYPING SEROLOGIC RH(D): CPT

## 2025-01-27 PROCEDURE — 84300 ASSAY OF URINE SODIUM: CPT

## 2025-01-27 PROCEDURE — 85652 RBC SED RATE AUTOMATED: CPT

## 2025-01-27 PROCEDURE — 73610 X-RAY EXAM OF ANKLE: CPT

## 2025-01-27 PROCEDURE — 84132 ASSAY OF SERUM POTASSIUM: CPT

## 2025-01-27 PROCEDURE — 82803 BLOOD GASES ANY COMBINATION: CPT

## 2025-01-27 PROCEDURE — C1887: CPT

## 2025-01-27 PROCEDURE — 87205 SMEAR GRAM STAIN: CPT

## 2025-01-27 PROCEDURE — 84295 ASSAY OF SERUM SODIUM: CPT

## 2025-01-27 PROCEDURE — C1769: CPT

## 2025-01-27 PROCEDURE — 82947 ASSAY GLUCOSE BLOOD QUANT: CPT

## 2025-01-27 PROCEDURE — 86900 BLOOD TYPING SEROLOGIC ABO: CPT

## 2025-01-27 PROCEDURE — 82553 CREATINE MB FRACTION: CPT

## 2025-01-27 PROCEDURE — 81003 URINALYSIS AUTO W/O SCOPE: CPT

## 2025-01-27 PROCEDURE — 96374 THER/PROPH/DIAG INJ IV PUSH: CPT

## 2025-01-27 PROCEDURE — 73630 X-RAY EXAM OF FOOT: CPT

## 2025-01-27 PROCEDURE — 86140 C-REACTIVE PROTEIN: CPT

## 2025-01-27 PROCEDURE — 84100 ASSAY OF PHOSPHORUS: CPT

## 2025-01-27 RX ORDER — OSELTAMIVIR PHOSPHATE 75 MG/1
1 CAPSULE ORAL
Qty: 2 | Refills: 0
Start: 2025-01-27 | End: 2025-01-28

## 2025-01-27 RX ORDER — PENTOXIFYLLINE 100 %
1 POWDER (GRAM) MISCELLANEOUS
Qty: 60 | Refills: 0
Start: 2025-01-27 | End: 2025-02-25

## 2025-01-27 RX ORDER — SODIUM BICARBONATE 42 MG/ML
1 INJECTION, SOLUTION INTRAVENOUS
Qty: 90 | Refills: 0
Start: 2025-01-27 | End: 2025-02-25

## 2025-01-27 RX ORDER — METOPROLOL SUCCINATE 25 MG
3 TABLET, EXTENDED RELEASE 24 HR ORAL
Qty: 90 | Refills: 0
Start: 2025-01-27 | End: 2025-02-25

## 2025-01-27 RX ORDER — ISOSORBIDE DINITRATE 40 MG/1
1 TABLET ORAL
Qty: 90 | Refills: 0
Start: 2025-01-27 | End: 2025-02-25

## 2025-01-27 RX ADMIN — Medication 400 MILLIGRAM(S): at 05:55

## 2025-01-27 RX ADMIN — ACETAMINOPHEN 975 MILLIGRAM(S): 160 SUSPENSION ORAL at 05:59

## 2025-01-27 RX ADMIN — Medication 40 MILLIGRAM(S): at 05:55

## 2025-01-27 RX ADMIN — MUPIROCIN 1 APPLICATION(S): 2 CREAM TOPICAL at 05:57

## 2025-01-27 RX ADMIN — ACETAMINOPHEN 975 MILLIGRAM(S): 160 SUSPENSION ORAL at 07:43

## 2025-01-27 RX ADMIN — ACETAMINOPHEN 975 MILLIGRAM(S): 160 SUSPENSION ORAL at 00:09

## 2025-01-27 RX ADMIN — MUPIROCIN 1 APPLICATION(S): 2 CREAM TOPICAL at 05:54

## 2025-01-27 RX ADMIN — ISOSORBIDE DINITRATE 10 MILLIGRAM(S): 40 TABLET ORAL at 12:15

## 2025-01-27 RX ADMIN — OSELTAMIVIR PHOSPHATE 30 MILLIGRAM(S): 75 CAPSULE ORAL at 12:15

## 2025-01-27 RX ADMIN — AMOXICILLIN AND CLAVULANATE POTASSIUM 1 TABLET(S): 200; 28.5 POWDER, FOR SUSPENSION ORAL at 08:02

## 2025-01-27 RX ADMIN — Medication 11 UNIT(S): at 08:02

## 2025-01-27 RX ADMIN — Medication 11 UNIT(S): at 12:11

## 2025-01-27 RX ADMIN — SODIUM BICARBONATE 650 MILLIGRAM(S): 42 INJECTION, SOLUTION INTRAVENOUS at 05:55

## 2025-01-27 RX ADMIN — ISOSORBIDE DINITRATE 10 MILLIGRAM(S): 40 TABLET ORAL at 05:56

## 2025-01-27 RX ADMIN — SODIUM BICARBONATE 650 MILLIGRAM(S): 42 INJECTION, SOLUTION INTRAVENOUS at 12:15

## 2025-01-27 RX ADMIN — Medication 75 MILLIGRAM(S): at 05:54

## 2025-01-27 NOTE — PROGRESS NOTE ADULT - SUBJECTIVE AND OBJECTIVE BOX
Patient is a 69y old  Male who presents with a chief complaint of left foot infection (27 Jan 2025 11:05)       INTERVAL HPI/OVERNIGHT EVENTS:  Patient seen and evaluated at bedside.  Pt is resting comfortable in NAD. Denies N/V/F/C.      Allergies    atorvastatin (Muscle Pain (Severe))    Intolerances        Vital Signs Last 24 Hrs  T(C): 36.5 (27 Jan 2025 11:21), Max: 36.6 (26 Jan 2025 20:23)  T(F): 97.7 (27 Jan 2025 11:21), Max: 97.9 (26 Jan 2025 20:23)  HR: 83 (27 Jan 2025 11:21) (76 - 91)  BP: 166/80 (27 Jan 2025 11:21) (102/68 - 166/80)  BP(mean): --  RR: 18 (27 Jan 2025 11:21) (18 - 18)  SpO2: 98% (27 Jan 2025 11:21) (94% - 98%)    Parameters below as of 27 Jan 2025 11:21  Patient On (Oxygen Delivery Method): room air        LABS:    01-26    136  |  99  |  52[H]  ----------------------------<  165[H]  4.3   |  23  |  2.04[H]    Ca    9.3      26 Jan 2025 13:51        Urinalysis Basic - ( 26 Jan 2025 13:51 )    Color: x / Appearance: x / SG: x / pH: x  Gluc: 165 mg/dL / Ketone: x  / Bili: x / Urobili: x   Blood: x / Protein: x / Nitrite: x   Leuk Esterase: x / RBC: x / WBC x   Sq Epi: x / Non Sq Epi: x / Bacteria: x      CAPILLARY BLOOD GLUCOSE      POCT Blood Glucose.: 139 mg/dL (27 Jan 2025 11:45)  POCT Blood Glucose.: 99 mg/dL (27 Jan 2025 07:40)  POCT Blood Glucose.: 91 mg/dL (26 Jan 2025 21:29)  POCT Blood Glucose.: 138 mg/dL (26 Jan 2025 17:13)      Lower Extremity Physical Exam:  Vascular: DP/PT 0/4, B/L, CFT <3 seconds B/L, Temperature gradient warm to cool, B/L.   Neuro: Epicritic sensation diminished to the level of digits, B/L.  Musculoskeletal/Ortho: unremarkable  Skin: Left foot dorsal midfoot wound to dermis with improved erythema extending from the base of digits 2-4 to the proximal midfoot, no drainage, no malodor, improvement of erythema with elevation. Right foot no open wounds, no acute signs of infection.      RADIOLOGY & ADDITIONAL TESTS:

## 2025-01-27 NOTE — PROGRESS NOTE ADULT - SUBJECTIVE AND OBJECTIVE BOX
SUBJECTIVE: Feeling better.  Dressed and waiting to be discharged.    MEDICATIONS  (STANDING):  acetaminophen     Tablet .. 975 milliGRAM(s) Oral every 6 hours  acetaminophen   IVPB .. 1000 milliGRAM(s) IV Intermittent once  acetaminophen   IVPB .. 1000 milliGRAM(s) IV Intermittent once  dextrose 5%. 1000 milliLiter(s) (100 mL/Hr) IV Continuous <Continuous>  dextrose 5%. 1000 milliLiter(s) (50 mL/Hr) IV Continuous <Continuous>  dextrose 50% Injectable 25 Gram(s) IV Push once  dextrose 50% Injectable 25 Gram(s) IV Push once  dextrose 50% Injectable 12.5 Gram(s) IV Push once  dextrose Oral Gel 15 Gram(s) Oral once  furosemide    Tablet 40 milliGRAM(s) Oral daily  glucagon  Injectable 1 milliGRAM(s) IntraMuscular once  influenza  Vaccine (HIGH DOSE) 0.5 milliLiter(s) IntraMuscular once  insulin glargine Injectable (LANTUS) 10 Unit(s) SubCutaneous at bedtime  insulin lispro (ADMELOG) corrective regimen sliding scale   SubCutaneous three times a day before meals  insulin lispro (ADMELOG) corrective regimen sliding scale   SubCutaneous at bedtime  insulin lispro Injectable (ADMELOG) 11 Unit(s) SubCutaneous before breakfast  insulin lispro Injectable (ADMELOG) 11 Unit(s) SubCutaneous before lunch  insulin lispro Injectable (ADMELOG) 11 Unit(s) SubCutaneous before dinner  isosorbide   dinitrate Tablet (ISORDIL) 10 milliGRAM(s) Oral three times a day  metoprolol succinate ER 75 milliGRAM(s) Oral daily  mupirocin 2% Nasal 1 Application(s) Both Nostrils two times a day  mupirocin 2% Ointment 1 Application(s) Topical <User Schedule>  oseltamivir 30 milliGRAM(s) Oral daily  pentoxifylline 400 milliGRAM(s) Oral two times a day  rivaroxaban 15 milliGRAM(s) Oral with dinner  sodium bicarbonate 650 milliGRAM(s) Oral three times a day  sodium chloride 0.9%. 250 milliLiter(s) (50 mL/Hr) IV Continuous <Continuous>    MEDICATIONS  (PRN):  albuterol/ipratropium for Nebulization 3 milliLiter(s) Nebulizer every 6 hours PRN Shortness of Breath and/or Wheezing  guaiFENesin Oral Liquid (Sugar-Free) 100 milliGRAM(s) Oral every 6 hours PRN Cough  sodium chloride 0.65% Nasal 1 Spray(s) Both Nostrils four times a day PRN Nasal Congestion        REVIEW OF SYSTEMS:    CONSTITUTIONAL: No fever, weight loss, or fatigue  EYES: No eye pain, visual disturbances, or discharge  NECK: No pain or stiffness  RESPIRATORY: No cough, wheezing, chills or hemoptysis; No Shortness of Breath  CARDIOVASCULAR: No chest pain, palpitations, dizziness, or leg swelling  GASTROINTESTINAL: No abdominal or epigastric pain. No nausea, vomiting, or hematemesis; No diarrhea or constipation. No melena or hematochezia.  GENITOURINARY: No dysuria, frequency, hematuria, or incontinence  NEUROLOGICAL: No headaches, memory loss, loss of strength, numbness, or tremors  SKIN: No itching, burning, rashes, or lesions   LYMPH Nodes: No enlarged glands  MUSCULOSKELETAL: No joint pain or swelling; No muscle, back, or extremity pain  All other review of systems are negative.  	    PHYSICAL EXAM:  T(F): 97.5 (01-27-25 @ 04:02), Max: 97.9 (01-26-25 @ 20:23)  HR: 76 (01-27-25 @ 05:55) (76 - 91)  BP: 152/82 (01-27-25 @ 05:55) (102/68 - 152/82)  RR: 18 (01-27-25 @ 04:02) (18 - 18)  SpO2: 96% (01-27-25 @ 04:02) (94% - 96%)  Wt(kg): --  ,   I&O's Summary    26 Jan 2025 07:01  -  27 Jan 2025 07:00  --------------------------------------------------------  IN: 340 mL / OUT: 0 mL / NET: 340 mL    27 Jan 2025 07:01  -  27 Jan 2025 11:06  --------------------------------------------------------  IN: 240 mL / OUT: 0 mL / NET: 240 mL            PHYSICAL EXAM    Appearance: Normal	  HEENT:   Normal oral mucosa, PERRL, EOMI	  NECK: Soft and supple, No LAD, No JVD  Cardiovascular: irregular normal s1s2  Respiratory: Lungs clear to auscultation	  Extremities: No clubbing, cyanosis or edema. Left foot bandaged.     LABS:	 	      01-25    138  |  101  |  55[H]  ----------------------------<  166[H]  4.4   |  23  |  2.39[H]    Ca    9.7      25 Jan 2025 06:42  -----------------------------------------------------------------------------              01-26    136  |  99  |  52[H]  ----------------------------<  165[H]  4.3   |  23  |  2.04[H]    Ca    9.3      26 Jan 2025 13:51                         Urinalysis Basic - ( 26 Jan 2025 13:51 )    Color: x / Appearance: x / SG: x / pH: x  Gluc: 165 mg/dL / Ketone: x  / Bili: x / Urobili: x   Blood: x / Protein: x / Nitrite: x   Leuk Esterase: x / RBC: x / WBC x   Sq Epi: x / Non Sq Epi: x / Bacteria: x

## 2025-01-27 NOTE — PROGRESS NOTE ADULT - SUBJECTIVE AND OBJECTIVE BOX
Overnight events noted      VITAL:  T(C): , Max: 36.8 (01-26-25 @ 11:03)  T(F): , Max: 98.3 (01-26-25 @ 11:03)  HR: 76 (01-27-25 @ 05:55)  BP: 152/82 (01-27-25 @ 05:55)  BP(mean): --  RR: 18 (01-27-25 @ 04:02)  SpO2: 96% (01-27-25 @ 04:02)  Wt(kg): --      PHYSICAL EXAM:  Constitutional: NAD, Alert  HEENT: NCAT, MMM  Neck: Supple, No JVD  Respiratory: CTA-b/l  Cardiovascular: reg s1s2  Gastrointestinal: BS+, soft, NT/ND  Extremities: No peripheral edema b/l  Neurological: no focal deficits; strength grossly intact  Back: no CVAT b/l  Skin: No rashes, no nevi    LABS:    Na(136)/K(4.3)/Cl(99)/HCO3(23)/BUN(52)/Cr(2.04)Glu(165)/Ca(9.3)/Mg(--)/PO4(--)    01-26 @ 13:51  Na(138)/K(4.4)/Cl(101)/HCO3(23)/BUN(55)/Cr(2.39)Glu(166)/Ca(9.7)/Mg(--)/PO4(--)    01-25 @ 06:42      IMPRESSION: 69M w/ HTN, DM2, AFib, CAD-10 stents, HFrEF, and CKD, 1/18/25 p/w L foot wound and acute on chronic HFrEF    (1)Renal - 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 Fluctuating numbers based on hemodynamic status    (2)Metabolic acidosis - controlled, on PO NaHCO3    (3)ID - L foot wound infection - on PO Augmentin    (4)Vasc - s/p LE angio Thurs 1/23    (5)CV - acceptable volume at present        RECOMMEND:  (1)Lasix, Spironolactone, and PO NaHCO3 as ordered  (2)No objection to discharge; can f/u at my office Wednesday 2/19/25 at 1040a as previously scheduled           Markell Walton MD  [facility]  Office/on call physician: [phone]  Cell (7a-7p): [phone]       No pain, no sob      VITAL:  T(C): , Max: 36.8 (01-26-25 @ 11:03)  T(F): , Max: 98.3 (01-26-25 @ 11:03)  HR: 76 (01-27-25 @ 05:55)  BP: 152/82 (01-27-25 @ 05:55)  BP(mean): --  RR: 18 (01-27-25 @ 04:02)  SpO2: 96% (01-27-25 @ 04:02)  Wt(kg): --      PHYSICAL EXAM:  Constitutional: NAD, Alert  HEENT: NCAT, MMM  Neck: Supple, No JVD  Respiratory: CTA-b/l  Cardiovascular: reg s1s2  Gastrointestinal: BS+, soft, NT/ND  Extremities: No peripheral edema b/l  Neurological: no focal deficits; strength grossly intact  Back: no CVAT b/l  Skin: No rashes, no nevi    LABS:    Na(136)/K(4.3)/Cl(99)/HCO3(23)/BUN(52)/Cr(2.04)Glu(165)/Ca(9.3)/Mg(--)/PO4(--)    01-26 @ 13:51  Na(138)/K(4.4)/Cl(101)/HCO3(23)/BUN(55)/Cr(2.39)Glu(166)/Ca(9.7)/Mg(--)/PO4(--)    01-25 @ 06:42      IMPRESSION: 69M w/ HTN, DM2, AFib, CAD-10 stents, HFrEF, and CKD, 1/18/25 p/w L foot wound and acute on chronic HFrEF    (1)Renal - 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 Fluctuating numbers based on hemodynamic status    (2)Metabolic acidosis - controlled, on PO NaHCO3    (3)ID - L foot wound infection - on PO Augmentin    (4)Vasc - s/p LE angio Thurs 1/23    (5)CV - acceptable volume at present        RECOMMEND:  (1)Lasix, Spironolactone, and PO NaHCO3 as were being taken at home  (2)No objection to discharge; can f/u at my office Wednesday 2/19/25 at 1040a as previously scheduled           Markell Walton MD  [facility]  Office/on call physician: [phone]  Cell (7a-7p): [phone]

## 2025-01-27 NOTE — PHARMACOTHERAPY INTERVENTION NOTE - COMMENTS
Collected medication history from patient and pharmacy. Home medication list updated in prescription writer/ outpatient medication review.    Home medications:  clopidogrel 75 mg oral tablet: 1 tab(s) orally once a day  dapagliflozin 10 mg oral tablet: 1 tab(s) orally once a day  furosemide 40 mg oral tablet: 1 tab(s) orally once a day  insulin glargine 100 units/mL subcutaneous solution: 24 unit(s) subcutaneous once a day (at bedtime)  insulin lispro 100 units/mL injectable solution: 16 unit(s) subcutaneous 3 times a day (before meals)  isosorbide dinitrate 10 mg oral tablet: 1 tab(s) orally 3 times a day  metoprolol succinate 50 mg oral tablet, extended release: 1 tab(s) orally once a day  potassium chloride 20 mEq oral tablet, extended release: 1 tab(s) orally once a day  rivaroxaban 15 mg oral tablet: 1 tab(s) orally once a day (before a meal)  spironolactone 25 mg oral tablet: 2 tab(s) orally once a day    Jack Davis, PharmD, BCPS  Clinical Pharmacy Specialist  Available on [employer]  Cell: [phone]
Heart Failure Medication Education      HFrEF (EF = 40-45% on 1/20/25)  Guideline directed medical therapy as below (name/dose/frequency):   Diuretic: furosemide 40mg once a day  BB: metoprolol succinate ER 50mg once a day  ARNI/ACE-I/ARB: --  MRA: spironolactone 50mg once a day  SGLT2i: Farxiga 10mg once a day  Hydralazine/Nitrate: isosorbide dinitrate 10mg 3 times a day    HFrEF – Recommend diuretics, BB, ARNI preferred/ACE-I/ARB, MRA, & SGLT2i     Counseled patient/caregiver on above medication names (brand/generic), indication, and possible side effects and provided medication cards.     Additional medications counseled on:   amoxicillin-clavulanate 875 mg-125 mg oral tablet: 1 tab(s) orally every 12 hours  doxycycline monohydrate 100 mg oral tablet: 1 tab(s) orally every 12 hours  ·	Counseled patient to avoid taking doxycycline with any dairy products, antacids, multivitamins with minerals, and to space 2 hours apart if needed. Patient also counseled to avoid prolonged sun exposure while taking doxycycline.    Counseled patient to observe and obtain daily weights and to notify doctor if >2-3 lbs/day or >5lbs/week weight gain, increased short of breath or using more pillows at nighttime. Answered all of the patient’s questions to the best of my ability. Patient exhibited understanding of heart failure medication regimen and management. Patient understood importance of compliance and to follow up with cardiologist outpatient after discharge.     -Was the patient offered Meds to Beds? Yes, opted out. New prescriptions sent to patient's preferred [facility]  -Was medication coverage confirmed for any new medications? No    Jack Davis, PharmD, BCPS  Clinical Pharmacy Specialist  Available on [employer]  Cell: [phone]
Counseled patient on the following discharge medications names (brand/generic), indication, and possible side effects:    Medications Discussed:  dapagliflozin 10 mg oral tablet: 1 tab(s) orally once a day  furosemide 40 mg oral tablet: 1 tab(s) orally once a day  insulin glargine 100 units/mL subcutaneous solution: 10 unit(s) subcutaneous once a day (at bedtime)  ·	Dose reduced from home 24 units   insulin lispro 100 units/mL injectable solution: 11 unit(s) subcutaneous 3 times a day (before meals)  ·	Dose reduced from home 16 units  isosorbide dinitrate 10 mg oral tablet: 1 tab(s) orally 3 times a day  metoprolol succinate 25 mg oral tablet, extended release: 3 tab(s) orally once a day  ·	Dose increased from home 50mg   oseltamivir 30 mg oral capsule: 1 cap(s) orally once a day  pentoxifylline 400 mg oral tablet, extended release: 1 tab(s) orally 2 times a day  rivaroxaban 15 mg oral tablet: 1 tab(s) orally once a day (before a meal)  sodium bicarbonate 650 mg oral tablet: 1 tab(s) orally 3 times a day  spironolactone 25 mg oral tablet: 2 tab(s) orally once a day    Provided medication cards. Patient questions and concerns were answered and addressed. Patient demonstrated understanding.     Jack Davis, PharmD, BCPS  Clinical Pharmacy Specialist  Available on [employer]  Cell: [phone]

## 2025-01-27 NOTE — PROGRESS NOTE ADULT - PROVIDER SPECIALTY LIST ADULT
Cardiology
Cardiology
Nephrology
Podiatry
Podiatry
Cardiology
Internal Medicine
Internal Medicine
Nephrology
Nephrology
Podiatry
Vascular Surgery
Cardiology
Internal Medicine
Nephrology
Nephrology
Podiatry
Podiatry
Vascular Surgery
Cardiology
Podiatry
Vascular Surgery
Internal Medicine
Internal Medicine
Vascular Surgery
Internal Medicine

## 2025-01-27 NOTE — PROGRESS NOTE ADULT - ASSESSMENT
#h/o ashd s/p multiple pci, last pci dLAD 4/17/24 by Dr ILDA Dominguez, known severe inoperable and non intervenable CAD   #HTN  #DM (Dr Reyes)  #jesika on ckd (Dr Christophe Walton),bun/cr 55/2.39 without change.  #s/p cea by Dr Calle  # hfref ef last 47%  #AF on xarelto  #elevated troponin likely demand ischemia, accuracy diminished setting renal dysfunction  #Left foot infection managed by vascular and podiatry, s/p pci by Dr Calle, now on trental.   #influenza A, on tamiflu  Stable at present cv perspective.   Awaiting discharge.

## 2025-01-27 NOTE — PROGRESS NOTE ADULT - REASON FOR ADMISSION
left foot infection

## 2025-01-27 NOTE — PROGRESS NOTE ADULT - ASSESSMENT
69M presents for left foot wound to dermis with cellulitis  - Pt seen and evaluated  - Afebrile, no leukocytosis   - Left foot dorsal midfoot wound to dermis with improved erythema extending from the base of digits 2-4 to the proximal midfoot, no drainage, no malodor, improvement of erythema with elevation. Right foot no open wounds, no acute signs of infection.  - Left foot X-ray read: no OM, no gas  - Left foot wound culture: no growth  - ID recs appreciated   - Vasc recs, appreciated   - Pt is stable for discharge from podiatry standpoint pending final ID recs  - Wound care instruction and follow up information in discharge note provider   - Seen with attending

## 2025-01-31 ENCOUNTER — INPATIENT (INPATIENT)
Facility: HOSPITAL | Age: 70
LOS: 6 days | Discharge: HOME HEALTH | DRG: 204 | End: 2025-02-07
Attending: GENERAL ACUTE CARE HOSPITAL | Admitting: GENERAL ACUTE CARE HOSPITAL
Payer: MEDICARE

## 2025-01-31 VITALS
HEIGHT: 66 IN | DIASTOLIC BLOOD PRESSURE: 88 MMHG | HEART RATE: 87 BPM | OXYGEN SATURATION: 95 % | SYSTOLIC BLOOD PRESSURE: 156 MMHG | WEIGHT: 134.92 LBS | RESPIRATION RATE: 20 BRPM

## 2025-01-31 DIAGNOSIS — E11.9 TYPE 2 DIABETES MELLITUS WITHOUT COMPLICATIONS: ICD-10-CM

## 2025-01-31 DIAGNOSIS — I25.10 ATHEROSCLEROTIC HEART DISEASE OF NATIVE CORONARY ARTERY WITHOUT ANGINA PECTORIS: ICD-10-CM

## 2025-01-31 DIAGNOSIS — I48.20 CHRONIC ATRIAL FIBRILLATION, UNSPECIFIED: ICD-10-CM

## 2025-01-31 DIAGNOSIS — Z86.79 PERSONAL HISTORY OF OTHER DISEASES OF THE CIRCULATORY SYSTEM: ICD-10-CM

## 2025-01-31 DIAGNOSIS — R06.09 OTHER FORMS OF DYSPNEA: ICD-10-CM

## 2025-01-31 DIAGNOSIS — Z29.9 ENCOUNTER FOR PROPHYLACTIC MEASURES, UNSPECIFIED: ICD-10-CM

## 2025-01-31 DIAGNOSIS — N18.30 CHRONIC KIDNEY DISEASE, STAGE 3 UNSPECIFIED: ICD-10-CM

## 2025-01-31 DIAGNOSIS — I50.23 ACUTE ON CHRONIC SYSTOLIC (CONGESTIVE) HEART FAILURE: ICD-10-CM

## 2025-01-31 LAB
ALBUMIN SERPL ELPH-MCNC: 4 G/DL — SIGNIFICANT CHANGE UP (ref 3.3–5)
ALP SERPL-CCNC: 163 U/L — HIGH (ref 40–120)
ALT FLD-CCNC: 44 U/L — SIGNIFICANT CHANGE UP (ref 10–45)
ANION GAP SERPL CALC-SCNC: 16 MMOL/L — SIGNIFICANT CHANGE UP (ref 5–17)
AST SERPL-CCNC: 65 U/L — HIGH (ref 10–40)
BASOPHILS # BLD AUTO: 0.02 K/UL — SIGNIFICANT CHANGE UP (ref 0–0.2)
BASOPHILS NFR BLD AUTO: 0.2 % — SIGNIFICANT CHANGE UP (ref 0–2)
BILIRUB SERPL-MCNC: 1.9 MG/DL — HIGH (ref 0.2–1.2)
BUN SERPL-MCNC: 44 MG/DL — HIGH (ref 7–23)
CALCIUM SERPL-MCNC: 9.6 MG/DL — SIGNIFICANT CHANGE UP (ref 8.4–10.5)
CHLORIDE SERPL-SCNC: 99 MMOL/L — SIGNIFICANT CHANGE UP (ref 96–108)
CK MB CFR SERPL CALC: 28.2 NG/ML — HIGH (ref 0–6.7)
CO2 SERPL-SCNC: 20 MMOL/L — LOW (ref 22–31)
CREAT SERPL-MCNC: 1.95 MG/DL — HIGH (ref 0.5–1.3)
EGFR: 37 ML/MIN/1.73M2 — LOW
EOSINOPHIL # BLD AUTO: 0.05 K/UL — SIGNIFICANT CHANGE UP (ref 0–0.5)
EOSINOPHIL NFR BLD AUTO: 0.6 % — SIGNIFICANT CHANGE UP (ref 0–6)
FLUAV AG NPH QL: DETECTED
FLUBV AG NPH QL: SIGNIFICANT CHANGE UP
GAS PNL BLDV: SIGNIFICANT CHANGE UP
GLUCOSE BLDC GLUCOMTR-MCNC: 168 MG/DL — HIGH (ref 70–99)
GLUCOSE SERPL-MCNC: 234 MG/DL — HIGH (ref 70–99)
HCT VFR BLD CALC: 46.7 % — SIGNIFICANT CHANGE UP (ref 39–50)
HGB BLD-MCNC: 15.3 G/DL — SIGNIFICANT CHANGE UP (ref 13–17)
IMM GRANULOCYTES NFR BLD AUTO: 0.7 % — SIGNIFICANT CHANGE UP (ref 0–0.9)
LACTATE SERPL-SCNC: 1.3 MMOL/L — SIGNIFICANT CHANGE UP (ref 0.5–2)
LYMPHOCYTES # BLD AUTO: 0.96 K/UL — LOW (ref 1–3.3)
LYMPHOCYTES # BLD AUTO: 11.6 % — LOW (ref 13–44)
MCHC RBC-ENTMCNC: 30.9 PG — SIGNIFICANT CHANGE UP (ref 27–34)
MCHC RBC-ENTMCNC: 32.8 G/DL — SIGNIFICANT CHANGE UP (ref 32–36)
MCV RBC AUTO: 94.3 FL — SIGNIFICANT CHANGE UP (ref 80–100)
MONOCYTES # BLD AUTO: 0.63 K/UL — SIGNIFICANT CHANGE UP (ref 0–0.9)
MONOCYTES NFR BLD AUTO: 7.6 % — SIGNIFICANT CHANGE UP (ref 2–14)
NEUTROPHILS # BLD AUTO: 6.58 K/UL — SIGNIFICANT CHANGE UP (ref 1.8–7.4)
NEUTROPHILS NFR BLD AUTO: 79.3 % — HIGH (ref 43–77)
NRBC # BLD: 0 /100 WBCS — SIGNIFICANT CHANGE UP (ref 0–0)
NRBC BLD-RTO: 0 /100 WBCS — SIGNIFICANT CHANGE UP (ref 0–0)
NT-PROBNP SERPL-SCNC: HIGH PG/ML (ref 0–300)
PLATELET # BLD AUTO: 249 K/UL — SIGNIFICANT CHANGE UP (ref 150–400)
POTASSIUM SERPL-MCNC: 4.9 MMOL/L — SIGNIFICANT CHANGE UP (ref 3.5–5.3)
POTASSIUM SERPL-SCNC: 4.9 MMOL/L — SIGNIFICANT CHANGE UP (ref 3.5–5.3)
PROT SERPL-MCNC: 6.8 G/DL — SIGNIFICANT CHANGE UP (ref 6–8.3)
RBC # BLD: 4.95 M/UL — SIGNIFICANT CHANGE UP (ref 4.2–5.8)
RBC # FLD: 14.1 % — SIGNIFICANT CHANGE UP (ref 10.3–14.5)
RSV RNA NPH QL NAA+NON-PROBE: DETECTED
SARS-COV-2 RNA SPEC QL NAA+PROBE: SIGNIFICANT CHANGE UP
SODIUM SERPL-SCNC: 135 MMOL/L — SIGNIFICANT CHANGE UP (ref 135–145)
TROPONIN T, HIGH SENSITIVITY RESULT: 289 NG/L — HIGH (ref 0–51)
TROPONIN T, HIGH SENSITIVITY RESULT: 357 NG/L — HIGH (ref 0–51)
WBC # BLD: 8.3 K/UL — SIGNIFICANT CHANGE UP (ref 3.8–10.5)
WBC # FLD AUTO: 8.3 K/UL — SIGNIFICANT CHANGE UP (ref 3.8–10.5)

## 2025-01-31 PROCEDURE — 99223 1ST HOSP IP/OBS HIGH 75: CPT

## 2025-01-31 PROCEDURE — 71046 X-RAY EXAM CHEST 2 VIEWS: CPT | Mod: 26

## 2025-01-31 PROCEDURE — 99285 EMERGENCY DEPT VISIT HI MDM: CPT | Mod: GC

## 2025-01-31 RX ORDER — PENTOXIFYLLINE 100 %
400 POWDER (GRAM) MISCELLANEOUS
Refills: 0 | Status: DISCONTINUED | OUTPATIENT
Start: 2025-01-31 | End: 2025-02-02

## 2025-01-31 RX ORDER — RIVAROXABAN 20 MG/1
15 TABLET, FILM COATED ORAL
Refills: 0 | Status: DISCONTINUED | OUTPATIENT
Start: 2025-01-31 | End: 2025-02-01

## 2025-01-31 RX ORDER — SODIUM BICARBONATE 42 MG/ML
650 INJECTION, SOLUTION INTRAVENOUS THREE TIMES A DAY
Refills: 0 | Status: DISCONTINUED | OUTPATIENT
Start: 2025-01-31 | End: 2025-02-03

## 2025-01-31 RX ORDER — INSULIN GLARGINE-YFGN 100 [IU]/ML
10 INJECTION, SOLUTION SUBCUTANEOUS AT BEDTIME
Refills: 0 | Status: DISCONTINUED | OUTPATIENT
Start: 2025-01-31 | End: 2025-02-01

## 2025-01-31 RX ORDER — INSULIN LISPRO 100/ML
11 VIAL (ML) SUBCUTANEOUS
Refills: 0 | Status: DISCONTINUED | OUTPATIENT
Start: 2025-01-31 | End: 2025-02-03

## 2025-01-31 RX ORDER — METOPROLOL SUCCINATE 25 MG
75 TABLET, EXTENDED RELEASE 24 HR ORAL DAILY
Refills: 0 | Status: DISCONTINUED | OUTPATIENT
Start: 2025-01-31 | End: 2025-02-07

## 2025-01-31 RX ORDER — DM/PSEUDOEPHED/ACETAMINOPHEN 10-30-250
25 CAPSULE ORAL ONCE
Refills: 0 | Status: DISCONTINUED | OUTPATIENT
Start: 2025-01-31 | End: 2025-02-07

## 2025-01-31 RX ORDER — DM/PSEUDOEPHED/ACETAMINOPHEN 10-30-250
12.5 CAPSULE ORAL ONCE
Refills: 0 | Status: DISCONTINUED | OUTPATIENT
Start: 2025-01-31 | End: 2025-02-07

## 2025-01-31 RX ORDER — INSULIN LISPRO 100/ML
VIAL (ML) SUBCUTANEOUS AT BEDTIME
Refills: 0 | Status: DISCONTINUED | OUTPATIENT
Start: 2025-01-31 | End: 2025-02-07

## 2025-01-31 RX ORDER — SODIUM CHLORIDE 9 G/ML
1000 INJECTION, SOLUTION INTRAVENOUS
Refills: 0 | Status: DISCONTINUED | OUTPATIENT
Start: 2025-01-31 | End: 2025-02-07

## 2025-01-31 RX ORDER — DM/PSEUDOEPHED/ACETAMINOPHEN 10-30-250
15 CAPSULE ORAL ONCE
Refills: 0 | Status: DISCONTINUED | OUTPATIENT
Start: 2025-01-31 | End: 2025-02-07

## 2025-01-31 RX ORDER — ISOSORBIDE DINITRATE 40 MG/1
10 TABLET ORAL THREE TIMES A DAY
Refills: 0 | Status: DISCONTINUED | OUTPATIENT
Start: 2025-01-31 | End: 2025-02-07

## 2025-01-31 RX ORDER — GLUCAGON 3 MG/1
1 POWDER NASAL ONCE
Refills: 0 | Status: DISCONTINUED | OUTPATIENT
Start: 2025-01-31 | End: 2025-02-07

## 2025-01-31 RX ORDER — INSULIN LISPRO 100/ML
VIAL (ML) SUBCUTANEOUS
Refills: 0 | Status: DISCONTINUED | OUTPATIENT
Start: 2025-01-31 | End: 2025-02-07

## 2025-01-31 RX ADMIN — INSULIN GLARGINE-YFGN 10 UNIT(S): 100 INJECTION, SOLUTION SUBCUTANEOUS at 21:07

## 2025-01-31 RX ADMIN — Medication 40 MILLIGRAM(S): at 19:25

## 2025-01-31 RX ADMIN — Medication 400 MILLIGRAM(S): at 21:07

## 2025-01-31 RX ADMIN — RIVAROXABAN 15 MILLIGRAM(S): 20 TABLET, FILM COATED ORAL at 21:07

## 2025-01-31 RX ADMIN — SODIUM BICARBONATE 650 MILLIGRAM(S): 42 INJECTION, SOLUTION INTRAVENOUS at 21:07

## 2025-01-31 RX ADMIN — ISOSORBIDE DINITRATE 10 MILLIGRAM(S): 40 TABLET ORAL at 21:07

## 2025-01-31 NOTE — H&P ADULT - PROBLEM SELECTOR PLAN 4
- continue Trental - was on bid dosing during prior admission, though per vascular surgery was recommended for tid. Continue bid for now.

## 2025-01-31 NOTE — ED ADULT NURSE NOTE - NSFALLHARMRISKINTERV_ED_ALL_ED

## 2025-01-31 NOTE — PATIENT PROFILE ADULT - FUNCTIONAL ASSESSMENT - BASIC MOBILITY 6.
3-calculated by average/Not able to assess (calculate score using [facility] averaging method)

## 2025-01-31 NOTE — PATIENT PROFILE ADULT - NSTRANSFERBELONGINGSRESP_GEN_A_NUR
Your [place] at Home: Care Instructions  Your Care Instructions  During your baby's first few weeks, you will spend most of your time feeding, diapering, and comforting your baby. You may feel overwhelmed at times. It is normal to wonder if you know what you are doing, especially if you are first-time parents.  care gets easier with every day. Soon you will know what each cry means and be able to figure out what your baby needs and wants. Follow-up care is a key part of your child's treatment and safety. Be sure to make and go to all appointments, and call your doctor if your child is having problems. It's also a good idea to know your child's test results and keep a list of the medicines your child takes. How can you care for your child at home? Feeding  · Feed your baby on demand. This means that you should breastfeed or bottle-feed your baby whenever he or she seems hungry. Do not set a schedule. · During the first 2 weeks,  babies need to be fed every 1 to 3 hours (10 to 12 times in 24 hours) or whenever the baby is hungry. Formula-fed babies may need fewer feedings, about 6 to 10 every 24 hours. · These early feedings often are short. Sometimes, a  nurses or drinks from a bottle only for a few minutes. Feedings gradually will last longer. · You may have to wake your sleepy baby to feed in the first few days after birth. Sleeping  · Always put your baby to sleep on his or her back, not the stomach. This lowers the risk of sudden infant death syndrome (SIDS). · Most babies sleep for a total of 18 hours each day. They wake for a short time at least every 2 to 3 hours. · Newborns have some moments of active sleep. The baby may make sounds or seem restless. This happens about every 50 to 60 minutes and usually lasts a few minutes. · At first, your baby may sleep through loud noises. Later, noises may wake your baby.   · When your  wakes up, he or she usually will be hungry and will need to be fed. Diaper changing and bowel habits  · Try to check your baby's diaper at least every 2 hours. If it needs to be changed, do it as soon as you can. That will help prevent diaper rash. · Your 's wet and soiled diapers can give you clues about your baby's health. Babies can become dehydrated if they're not getting enough breast milk or formula or if they lose fluid because of diarrhea, vomiting, or a fever. · For the first few days, your baby may have about 3 wet diapers a day. After that, expect 6 or more wet diapers a day throughout the first month of life. It can be hard to tell when a diaper is wet if you use disposable diapers. If you cannot tell, put a piece of tissue in the diaper. It will be wet when your baby urinates. · Keep track of what bowel habits are normal or usual for your child. Umbilical cord care  · Gently clean your baby's umbilical cord stump and the skin around it at least one time a day. You also can clean it during diaper changes. · Gently pat dry the area with a soft cloth. You can help your baby's umbilical cord stump fall off and heal faster by keeping it dry between cleanings. · The stump should fall off within a week or two. After the stump falls off, keep cleaning around the belly button at least one time a day until it has healed. When should you call for help? Call your baby's doctor now or seek immediate medical care if:  ? · Your baby has a rectal temperature that is less than 97.8°F or is 100.4°F or higher. Call if you cannot take your baby's temperature but he or she seems hot. ? · Your baby has no wet diapers for 6 hours. ? · Your baby's skin or whites of the eyes gets a brighter or deeper yellow. ? · You see pus or red skin on or around the umbilical cord stump. These are signs of infection. ? Watch closely for changes in your child's health, and be sure to contact your doctor if:  ? · Your baby is not having regular bowel movements based on his or her age. ? · Your baby cries in an unusual way or for an unusual length of time. ? · Your baby is rarely awake and does not wake up for feedings, is very fussy, seems too tired to eat, or is not interested in eating. Where can you learn more? Go to http://dimitri-alra.info/. Enter V706 in the search box to learn more about \"Your  at Home: Care Instructions. \"  Current as of: May 12, 2017  Content Version: 11.4  © 8330-5446 [employer]. Care instructions adapted under license by [employer] (which disclaims liability or warranty for this information). If you have questions about a medical condition or this instruction, always ask your healthcare professional. [address] any warranty or liability for your use of this information. yes

## 2025-01-31 NOTE — PATIENT PROFILE ADULT - FALL HARM RISK - RISK INTERVENTIONS

## 2025-01-31 NOTE — H&P ADULT - PROBLEM SELECTOR PLAN 6
During prior admission was getting 10U Lantus qhs and 11U Admelog qac, continue this regimen for now  CC diet  Titrate to goal glucose 140-180

## 2025-01-31 NOTE — ED PROVIDER NOTE - ATTENDING CONTRIBUTION TO CARE
59 M w/ hx of CAD s/p 10 stents, HFrEF (EF~45% as of 11/26/23), A-fib on xarelto, HTN, HLD, T2DM on insulin, CKD3 p/w SOB and CLEARY, recent flu dx, saw cardiologist, DR. Patel who recommended the patient  be admitted for further management of unstable angina, and cardiac catherization, pt reports that he has improveing LLE swelling since hospital discharge, he didn't take isosorbide nitrate which prompted him to develop CP, pt w/ no arm, leg pain, no nausea no vomiting, pt w/ no cp currently, he mireya any fevers, no chills, no lightheadedness,   of note pt w/ severe b/l PAD c/b cellulitis of the L foot and prior chf exacerbation  Const: no acute distress, Well-developed, Eyes: no conjunctival injection and no scleral icterus ENMT: Moist mucus membranes, CVS: +S1/S2, radial/ weak lower leg pulses bilaterally RESP: diminished breath sounds at the bases,  Unlabored respiratory effort, Clear to auscultation bilaterally GI: Nontender/Nondistended soft abdomen, no CVA tenderness MSK: Extremities w/o deformity or ttp, Psych: Awake, Alert, & Orientedx3;  Appropriate mood and affect, cooperative Pt will have labs imaging and admissio nfor further management of his unstable angina

## 2025-01-31 NOTE — H&P ADULT - PROBLEM SELECTOR PLAN 2
Per history has 10 stents  Noted elevated troponin on admission, though less concerning given CKD  Noted Per history has 10 stents  Noted elevated troponin on admission, though less concerning given CKD  Noted was discharged off of any antiplatelets agents following previous admission? - follow up with cardiology  Per history has severe statin intolerance  Per ED note, may go for LHC this admission

## 2025-01-31 NOTE — ED PROVIDER NOTE - CLINICAL SUMMARY MEDICAL DECISION MAKING FREE TEXT BOX
60 yo M w/ PMHx of CAD s/p 10 stents, HFrEF (EF 40-45%, 1/2025), A-fib on Xarelto, HTN, HLD, T2DM on insulin, CKD3, and carotid endarterectomy presents from cardiologist office for dyspnea on exertion with near syncopal episode (SpO2 decreasing to 91%).  Cardiology sent patient into the ED for concern for unstable angina that is likely not intervenable, but with the goal of admitting to medicine with nonurgent cardiac catheterization.  He reports improving LLE swelling and only 1 episode of chest pain today morning after not taking isosorbide mononitrate - improved after taking it.  He states that his dyspnea on exertion is at baseline and reports no fevers.  He endorses some coughing and weakness, but denies any worsening leg swelling.  He is compliant with all his medications including Xarelto.  He endorses diarrhea (improved).  He denies any vomiting/abdominal pain, SOB at rest, sore throat.    Patient recently admitted 1/17 - 1/25/2025 for left foot infection and increased dyspnea on exertion.  Patient was found to have severe bilateral peripheral artery disease complicated by cellulitis of left foot, CHF exacerbation, and BARRY.  On 1/23, patient had LV angiogram with placement of PT/peroneal stents.  Patient was found to influenza positive and discharged on Tamiflu (completed).  Collateral obtained from cardiologist - recommending admission for unstable angina requiring nonemergent catheterization. (Dr. Moon)    Cardiology: Dr. Alfred Moon  Nephrology: Dr. Markell Meraz  Vascular Surgery: Dr. Tyrone Calle    Vital signs remarkable for /95.  Physical exam is remarkable for 1+ pitting edema bilateral lower extremities -normal heart sounds clear lung sounds, no abdominal TTP.  Concern for dyspnea on exertion secondary to unstable angina versus CHF exacerbation.  Low concern for PE/DVT in the setting of Xarelto compliance. Plan for ACS workup, EKG, troponins, CK-MB,

## 2025-01-31 NOTE — H&P ADULT - PROBLEM SELECTOR PLAN 1
Suspect heart failure exacerbation secondary to RSV  - start IV Lasix 40mg bid  - strict I/O, daily weights  - continue home Isordil, Toprol, Aldactone, hold SGLT2i while admitted Suspect heart failure exacerbation secondary to RSV given LE edema, dyspnea, proBNP elevation  - start IV Lasix 40mg bid  - strict I/O, daily weights  - continue home Isordil, Toprol, Aldactone, hold SGLT2i while admitted

## 2025-01-31 NOTE — H&P ADULT - ASSESSMENT
69M w/ CAD s/p stents, HFrEF, AFib, HTN, T2DM, CKD3 admitted with exertional dyspnea presumed secondary to heart failure and RSV.

## 2025-01-31 NOTE — ED CLERICAL - NS ED CLERK NOTE PRE-ARRIVAL INFORMATION; ADDITIONAL PRE-ARRIVAL INFORMATION
CC/Reason For referral: recent d/c from hospital, s/p stent in lower extremity, SOB, + flu, hypoxic with ambulation  Preferred Consultant(if applicable): vascular and cardiology  Who admits for you (if needed): n/a  Do you have documents you would like to fax over? no  Would you still like to speak to an ED attending? yes

## 2025-01-31 NOTE — ED ADULT NURSE NOTE - OBJECTIVE STATEMENT
patient is a 70 y/o M with hx of CHF, stented coronary artery, afib, HTN, HLD, DM, CAD who presents to the ED via triage c/o SOB. patient states that he was recently admitted for a wound on his right foot and CLEARY, patient d/c and has been experiencing ongoing SOB and CLEARY. per wife at bedside patient SPO2 reading at home 91% on RA, on arrival patient >95% on RA. patient flu positive from previous admission. patient a&ox4, PERRL, respirations even and unlabored. abdomen soft, nondistended. skin intact. denies fevers/chills, numbness/tingling, weakness, headache, dizziness, vision changes, cp, abd pain, n/v/d, dysuria, hematuria, bloody stools, back pain. MD Cho at bedside to assess. updated on plan. comfort and safety maintained

## 2025-01-31 NOTE — H&P ADULT - NSHPREVIEWOFSYSTEMS_GEN_ALL_CORE
REVIEW OF SYSTEMS:    CONSTITUTIONAL: +weakness, no fevers or chills  EYES: No visual changes; no sclera icterics, no pain or drainage  ENT:  No vertigo or throat pain   NECK: No pain or stiffness  RESPIRATORY: +cough, no wheezing, hemoptysis; +exertional shortness of breath  CARDIOVASCULAR: No chest pain or palpitations  GASTROINTESTINAL: No abdominal or epigastric pain. No nausea, vomiting, or hematemesis; No diarrhea or constipation. No melena or hematochezia.  GENITOURINARY: No dysuria, frequency or hematuria  NEUROLOGICAL: No numbness or weakness  SKIN: No itching, rashes  Psych: No anxiety or depression

## 2025-01-31 NOTE — PATIENT PROFILE ADULT - HOME ACCESSIBILITY CONCERNS
Anticoagulation Clinic Progress Note  Indication: atrial fibrillation  Referring Provider: Sravani [last appt 9/29/21  next: 9/8/22 (Will Sue)]  Initial Warfarin Start Date: 2008  Goal INR: 2 - 3  Current Drug Interactions: fluoxetine, glimepiride, melatonin (PRN), mirtazepine, MVI  CHADS-VASc: 3 (age, HTN, DM)    EtOH: none  GLV: Salad (Spinach salad 1x weekly or garden salad) and serving of broccoli once a week 3/1/2022  OTC Pain Relief: Uses APAP prn (~1x/week)    Anticoagulation Clinic INR History:  Date 8/20 9/4 10/2 11/6 11/15 11/29 12/31 1/7/19 1/17 1/29 2/12   Total Weekly Dose 17.5  mg 17.5 mg 17.5 mg 10mg 20mg 17.5mg 17.5mg 18.75 mg 17.5 mg 18.75 mg 20mg   INR 2.3 2.3 2.2 1.3 2.3 2.2 1.7 1.9 1.9 1.9 2.0   Notes    pre- epidural   missed dose?         Date 2/27 3/27 4/10 4/23 5/7 5/28 6/25 7/30 8/28 9/25 10/23   Total Weekly Dose 20mg 20mg 21.25 mg 21.25 mg 21.25 mg 21.25mg 21.25mg 21.25mg 21.25mg 21.25mg 21.25mg   INR 2.2 1.8 2.5 1.8 2.4 2.4 2.5 2.7 2.6 3.0 2.9   Notes sick   post- scope            Date  11/6 11/13 12/4 1/3 1/15 1/20 1/31 2/10 2/28 4/14 5/26   Total Weekly Dose 21.25 mg 18.75 mg 21.25 mg 21.25 mg 21.25 mg 20 mg 21.25 21.25 20mg 21.25mg 21.25mg   INR 4.2 3.2 2.7 2.2 3.1 3.1 2.9 3.5 2.4 2.3 2.5   Notes apap Hold x1, less GLV Inc in GLV  doxy doxy  1x dose cephalexin 1x dose dec; keflex       Date  6/24 7/22 8/24 9/10 10/8 11/5 11/18 12/2 12/30 1/26 2/22 3/22 4/19   Total Weekly Dose 21.25mg 21.25 21.25mg 21.25mg 21.25mg 21.25mg 20mg 20mg 20mg 20mg  20mg 20 mg 20mg   INR 2.4 2.4 3.0 2.8 3.0 3.1 2.5 2.5 2.5 2.6 2.9  2 2.2   Notes      Inc. GLV Dose dec    desvenlafaxine broccoli      Date 5/13 6/16  7/7 7/21 8/11 9/1 9/29 10/27 11/3 12/1 12/9 12/16   Total WeeklyDose 20 mg 20mg  20 mg 18.75mg  17.5 mg 17.5mg 17.5 17.5mg 17.5mg 17.5mg 18.75mg 22.5mg   INR 2.5 3.2 3.1 3.0 2.2 2.4 2.2 2.4 2.3 1.5 1.6 2.2   Notes  apap Inc GLV     augmentin day 3 augementin glucerna  Boost x2     Date  12/28 1/13/22 2/8 2/16 3/1 3/22 4/12 5/10 6/7 7/5 8/2    Total WeeklyDose 21.25mg 21.25mg 21.25 mg 21.25mg 22.5mg 22.5 mg 22.5mg 22.5 mg 22.5 mg 22.5mg 22.5mg EGD x 3 days hold   INR 2.6 2.3 1.7 2.1 2.5 2.0 2.3 2.4 2.1 2.1 2.8    Notes    Dec GLV        Boost x2     Date 8/25 9/8 9/29            Total WeeklyDose 18.75mg 22.5mg 22.5 mg            INR 2.7 2.7 2.5            Notes omeprazole initi                  Clinic Interview:  Verbal Release Authorization signed on 6/27/18 -- may speak with Sussy (wife), Nikolai (son)  Tablet Strength: pt has 2.5mg tablets  Phone: [phone] (Home), [phone]   Fax: [phone] (Attn: Tasha) [facility]    Patient Findings    Positives:  Signs/symptoms of bleeding, Change in health   Negatives:  Signs/symptoms of thrombosis, Laboratory test error suspected, Change in alcohol use, Change in activity, Upcoming invasive procedure, Emergency department visit, Upcoming dental procedure, Missed doses, Extra doses, Change in medications, Change in diet/appetite, Hospital admission, Bruising, Other complaints   Comments:  He has some places on legs and on top of foot that cardiologist said to keep moist so that they don't crack open and bleed. Podiatrist said to follow up with endocrinologist. He sees endocrinologist on 10/12. They will let us know for any medication changes or otherwise. Continues omeprazole       Plan:   1. INR is therapeutic today at 2.5 (goal 2.0-3.0).  Instructed Mr. Wolf to continue 3.75mg daily oral daily except 2.5mg TueThurSun until recheck.   2. RTC in 4 weeks.   3. Verbal and written information was provided to Mr. Wolf and Mrs. Wolf in clinic. Mr. Wolf voiced understanding with teach back and has no further questions at this time.     Simon Real [facility], PharmD  09/29/22   11:20 EDT         none

## 2025-01-31 NOTE — H&P ADULT - HISTORY OF PRESENT ILLNESS
69M w/ CAD s/p stents, HFrEF, AFib, HTN, T2DM, CKD3 presenting to the hospital with exertional dyspnea. Patient was recently admitted with foot cellulitis, arterial insufficiency, heart failure exacerbation and influenza. States he continued to have shortness of breath following hospital discharge and when he went to his cardiologist's office today, he couldn't walk more than a few steps without becoming winded and became borderline hypoxic, which prompted their office to refer him to the ED. He has had questionable adherence to his medications since discharge. Notes trouble walking around especially going up stairs. Has also had some cough and congestion since discharge. Denies chest pain, fever, chills, or other symptoms at present. Has no shortness of breath at rest.

## 2025-01-31 NOTE — H&P ADULT - NSHPLABSRESULTS_GEN_ALL_CORE
LABS:                          15.3   8.30  )-----------( 249      ( 31 Jan 2025 16:14 )             46.7     01-31    135  |  99  |  44[H]  ----------------------------<  234[H]  4.9   |  20[L]  |  1.95[H]    Ca    9.6      31 Jan 2025 16:14    TPro  6.8  /  Alb  4.0  /  TBili  1.9[H]  /  DBili  x   /  AST  65[H]  /  ALT  44  /  AlkPhos  163[H]  01-31    LIVER FUNCTIONS - ( 31 Jan 2025 16:14 )  Alb: 4.0 g/dL / Pro: 6.8 g/dL / ALK PHOS: 163 U/L / ALT: 44 U/L / AST: 65 U/L / GGT: x             Urinalysis Basic - ( 31 Jan 2025 16:14 )    Color: x / Appearance: x / SG: x / pH: x  Gluc: 234 mg/dL / Ketone: x  / Bili: x / Urobili: x   Blood: x / Protein: x / Nitrite: x   Leuk Esterase: x / RBC: x / WBC x   Sq Epi: x / Non Sq Epi: x / Bacteria: x

## 2025-01-31 NOTE — ED PROVIDER NOTE - PHYSICAL EXAMINATION
GENERAL: no acute distress, ectomorphic body habitus  HEENT: atraumatic, normocephalic, vision grossly intact, EOMI, no conjunctivitis or discharge, hearing grossly intact, no nasal discharge or epistaxis, clear pharynx  CV: regular rate, normal rhythm, normal S1/S2, no murmurs/rubs, no cyanosis  PULM: normal work of breathing, normal O2 saturation on RA, clear breath sounds in b/l upper/lower lung fields, no crackles/rales/rhonchi/wheezing  GI: soft/non-tender/nondistended abdomen, no guarding or rebound tenderness, no palpable masses  NEURO: A&Ox4, follows commands, normal speech, no focal motor or sensory deficits  MSK: no joint tenderness/swelling/erythema, ranging all extremities with no appreciable loss of ROM  EXT: 1+ pitting edema in b/l LEs, no calf tenderness, no redness or swelling  SKIN: 1-2 cm diameter healing wound on dorsal surface of L foot  PSYCH: appropriate mood and affect

## 2025-01-31 NOTE — ED PROVIDER NOTE - PROGRESS NOTE DETAILS
Karen PGY3: Labs remarkable for creatinine 1.95 (baseline 2 on previous admission), glucose 234, troponin 357 (troponins peaked at 660 on prior admission), BNP 23,573 (previously and 17,000's).  EKG did not showed less than 1 mm ST depressions in lateral leads with no T wave inversions or ST elevations.  Patient has no active chest pain at this time.  Concern for CHF exacerbation and elevated troponins in the setting of unstable angina per cardiologist.  Admitted to medicine under Dr. Mcdaniel.

## 2025-02-01 LAB
ALBUMIN SERPL ELPH-MCNC: 3.5 G/DL — SIGNIFICANT CHANGE UP (ref 3.3–5)
ALP SERPL-CCNC: 131 U/L — HIGH (ref 40–120)
ALT FLD-CCNC: 31 U/L — SIGNIFICANT CHANGE UP (ref 10–45)
ANION GAP SERPL CALC-SCNC: 15 MMOL/L — SIGNIFICANT CHANGE UP (ref 5–17)
AST SERPL-CCNC: 42 U/L — HIGH (ref 10–40)
BASOPHILS # BLD AUTO: 0.03 K/UL — SIGNIFICANT CHANGE UP (ref 0–0.2)
BASOPHILS NFR BLD AUTO: 0.4 % — SIGNIFICANT CHANGE UP (ref 0–2)
BILIRUB SERPL-MCNC: 1.6 MG/DL — HIGH (ref 0.2–1.2)
BUN SERPL-MCNC: 41 MG/DL — HIGH (ref 7–23)
CALCIUM SERPL-MCNC: 9 MG/DL — SIGNIFICANT CHANGE UP (ref 8.4–10.5)
CHLORIDE SERPL-SCNC: 104 MMOL/L — SIGNIFICANT CHANGE UP (ref 96–108)
CO2 SERPL-SCNC: 20 MMOL/L — LOW (ref 22–31)
CREAT SERPL-MCNC: 1.86 MG/DL — HIGH (ref 0.5–1.3)
EGFR: 39 ML/MIN/1.73M2 — LOW
EOSINOPHIL # BLD AUTO: 0.11 K/UL — SIGNIFICANT CHANGE UP (ref 0–0.5)
EOSINOPHIL NFR BLD AUTO: 1.4 % — SIGNIFICANT CHANGE UP (ref 0–6)
GLUCOSE BLDC GLUCOMTR-MCNC: 121 MG/DL — HIGH (ref 70–99)
GLUCOSE BLDC GLUCOMTR-MCNC: 131 MG/DL — HIGH (ref 70–99)
GLUCOSE BLDC GLUCOMTR-MCNC: 217 MG/DL — HIGH (ref 70–99)
GLUCOSE BLDC GLUCOMTR-MCNC: 63 MG/DL — LOW (ref 70–99)
GLUCOSE BLDC GLUCOMTR-MCNC: 65 MG/DL — LOW (ref 70–99)
GLUCOSE BLDC GLUCOMTR-MCNC: 69 MG/DL — LOW (ref 70–99)
GLUCOSE BLDC GLUCOMTR-MCNC: 74 MG/DL — SIGNIFICANT CHANGE UP (ref 70–99)
GLUCOSE BLDC GLUCOMTR-MCNC: 79 MG/DL — SIGNIFICANT CHANGE UP (ref 70–99)
GLUCOSE SERPL-MCNC: 73 MG/DL — SIGNIFICANT CHANGE UP (ref 70–99)
HCT VFR BLD CALC: 42.5 % — SIGNIFICANT CHANGE UP (ref 39–50)
HGB BLD-MCNC: 13.9 G/DL — SIGNIFICANT CHANGE UP (ref 13–17)
IMM GRANULOCYTES NFR BLD AUTO: 0.9 % — SIGNIFICANT CHANGE UP (ref 0–0.9)
LYMPHOCYTES # BLD AUTO: 1.15 K/UL — SIGNIFICANT CHANGE UP (ref 1–3.3)
LYMPHOCYTES # BLD AUTO: 15.1 % — SIGNIFICANT CHANGE UP (ref 13–44)
MAGNESIUM SERPL-MCNC: 2.1 MG/DL — SIGNIFICANT CHANGE UP (ref 1.6–2.6)
MCHC RBC-ENTMCNC: 30.3 PG — SIGNIFICANT CHANGE UP (ref 27–34)
MCHC RBC-ENTMCNC: 32.7 G/DL — SIGNIFICANT CHANGE UP (ref 32–36)
MCV RBC AUTO: 92.8 FL — SIGNIFICANT CHANGE UP (ref 80–100)
MONOCYTES # BLD AUTO: 0.68 K/UL — SIGNIFICANT CHANGE UP (ref 0–0.9)
MONOCYTES NFR BLD AUTO: 8.9 % — SIGNIFICANT CHANGE UP (ref 2–14)
NEUTROPHILS # BLD AUTO: 5.59 K/UL — SIGNIFICANT CHANGE UP (ref 1.8–7.4)
NEUTROPHILS NFR BLD AUTO: 73.3 % — SIGNIFICANT CHANGE UP (ref 43–77)
NRBC # BLD: 0 /100 WBCS — SIGNIFICANT CHANGE UP (ref 0–0)
NRBC BLD-RTO: 0 /100 WBCS — SIGNIFICANT CHANGE UP (ref 0–0)
PHOSPHATE SERPL-MCNC: 2.9 MG/DL — SIGNIFICANT CHANGE UP (ref 2.5–4.5)
PLATELET # BLD AUTO: 228 K/UL — SIGNIFICANT CHANGE UP (ref 150–400)
POTASSIUM SERPL-MCNC: 3.9 MMOL/L — SIGNIFICANT CHANGE UP (ref 3.5–5.3)
POTASSIUM SERPL-SCNC: 3.9 MMOL/L — SIGNIFICANT CHANGE UP (ref 3.5–5.3)
PROT SERPL-MCNC: 5.7 G/DL — LOW (ref 6–8.3)
RBC # BLD: 4.58 M/UL — SIGNIFICANT CHANGE UP (ref 4.2–5.8)
RBC # FLD: 14 % — SIGNIFICANT CHANGE UP (ref 10.3–14.5)
SODIUM SERPL-SCNC: 139 MMOL/L — SIGNIFICANT CHANGE UP (ref 135–145)
WBC # BLD: 7.63 K/UL — SIGNIFICANT CHANGE UP (ref 3.8–10.5)
WBC # FLD AUTO: 7.63 K/UL — SIGNIFICANT CHANGE UP (ref 3.8–10.5)

## 2025-02-01 RX ORDER — INSULIN GLARGINE-YFGN 100 [IU]/ML
5 INJECTION, SOLUTION SUBCUTANEOUS AT BEDTIME
Refills: 0 | Status: DISCONTINUED | OUTPATIENT
Start: 2025-02-01 | End: 2025-02-04

## 2025-02-01 RX ORDER — HEPARIN SODIUM,PORCINE 10000/ML
2500 VIAL (ML) INJECTION EVERY 6 HOURS
Refills: 0 | Status: DISCONTINUED | OUTPATIENT
Start: 2025-02-01 | End: 2025-02-05

## 2025-02-01 RX ORDER — HEPARIN SODIUM,PORCINE 10000/ML
VIAL (ML) INJECTION
Qty: 25000 | Refills: 0 | Status: DISCONTINUED | OUTPATIENT
Start: 2025-02-01 | End: 2025-02-04

## 2025-02-01 RX ORDER — HEPARIN SODIUM,PORCINE 10000/ML
5500 VIAL (ML) INJECTION EVERY 6 HOURS
Refills: 0 | Status: DISCONTINUED | OUTPATIENT
Start: 2025-02-01 | End: 2025-02-05

## 2025-02-01 RX ORDER — DM/PSEUDOEPHED/ACETAMINOPHEN 10-30-250
12.5 CAPSULE ORAL ONCE
Refills: 0 | Status: COMPLETED | OUTPATIENT
Start: 2025-02-01 | End: 2025-02-01

## 2025-02-01 RX ADMIN — SODIUM BICARBONATE 650 MILLIGRAM(S): 42 INJECTION, SOLUTION INTRAVENOUS at 06:15

## 2025-02-01 RX ADMIN — Medication 40 MILLIGRAM(S): at 13:29

## 2025-02-01 RX ADMIN — SODIUM BICARBONATE 650 MILLIGRAM(S): 42 INJECTION, SOLUTION INTRAVENOUS at 22:01

## 2025-02-01 RX ADMIN — Medication 1200 UNIT(S)/HR: at 18:09

## 2025-02-01 RX ADMIN — Medication 400 MILLIGRAM(S): at 06:15

## 2025-02-01 RX ADMIN — INSULIN GLARGINE-YFGN 5 UNIT(S): 100 INJECTION, SOLUTION SUBCUTANEOUS at 22:09

## 2025-02-01 RX ADMIN — Medication 400 MILLIGRAM(S): at 17:23

## 2025-02-01 RX ADMIN — Medication 75 MILLIGRAM(S): at 06:15

## 2025-02-01 RX ADMIN — Medication 2: at 17:21

## 2025-02-01 RX ADMIN — ISOSORBIDE DINITRATE 10 MILLIGRAM(S): 40 TABLET ORAL at 17:20

## 2025-02-01 RX ADMIN — ISOSORBIDE DINITRATE 10 MILLIGRAM(S): 40 TABLET ORAL at 11:36

## 2025-02-01 RX ADMIN — Medication 50 MILLIGRAM(S): at 06:15

## 2025-02-01 RX ADMIN — Medication 11 UNIT(S): at 17:22

## 2025-02-01 RX ADMIN — ISOSORBIDE DINITRATE 10 MILLIGRAM(S): 40 TABLET ORAL at 06:15

## 2025-02-01 RX ADMIN — Medication 11 UNIT(S): at 08:27

## 2025-02-01 RX ADMIN — Medication 40 MILLIGRAM(S): at 06:14

## 2025-02-01 RX ADMIN — SODIUM BICARBONATE 650 MILLIGRAM(S): 42 INJECTION, SOLUTION INTRAVENOUS at 13:31

## 2025-02-01 NOTE — CONSULT NOTE ADULT - SUBJECTIVE AND OBJECTIVE BOX
69 year old male with severe CAD S/P numerous stents and now has non surgical and non revascularizable disease with an EF 40-45%  chronic AF  He is also poorly compliant with medications.  He was recently admitted for LE cellulitis S/P angioplasty.  He now presents with increasing severe CLEARY and increased LE edema    PMH:  Rhabdomyolysis due to statins   CKD   PAD    Meds:  Xarelto 15 mg qd             Lasix 40 mg IV bid             Isordil 10 mg tid             Metoprolol ER 75 mg qd             Spironolactone 50 mg qd             Na HCO3 650 mg tid             Trental 400 mg bid    /79  HR 79  Lungs bilateral crackles  Irregular rhythm 2/6 systolic murmur  Moderate edema bilateral     BUN 41  Crt 1.86  down from 52  2.04  CBC  normal  Troponin 289  357   Troponin (1/24/25)  290  282    EKG:  AF with non specific ST-T changes  low QRS voltage in the limb leads    TTE 1/20/25 - EF 40-45%  mild-moderate MR  mild AI  mild TR with an est PA 49 mm Hg    Positive for RSV    Imp:  Acute on chronic CHF with severe CAD  CKD as well as being positive for RSV.  The elevated Troponin is chronic with out a Delta  Agree with Lasix 40 mg IV bid   ?? addition of ASA due to recent angioplasty of the LE   ***  Trental is absolutely contra indicated due to CHF and decreased LV function  ***

## 2025-02-01 NOTE — CONSULT NOTE ADULT - SUBJECTIVE AND OBJECTIVE BOX
HPI:  69M w/ CAD s/p stents, HFrEF, AFib, HTN, T2DM, CKD3 presenting to the hospital with exertional dyspnea. Patient was recently admitted with foot cellulitis, arterial insufficiency, heart failure exacerbation and influenza. States he continued to have shortness of breath following hospital discharge and when he went to his cardiologist's office today, he couldn't walk more than a few steps without becoming winded and became borderline hypoxic, which prompted their office to refer him to the ED. He has had questionable adherence to his medications since discharge. Notes trouble walking around especially going up stairs. Has also had some cough and congestion since discharge. Denies chest pain, fever, chills, or other symptoms at present. Has no shortness of breath at rest. (2025 18:41)  Nephrology called for elevated serum creatinine. Patient denied any urinary sx.       PAST MEDICAL & SURGICAL HISTORY:  Former smoker      CAD in native artery      Type 2 diabetes mellitus      Hyperlipidemia, unspecified hyperlipidemia type      Essential hypertension, hypertension with unspecified goal      Afib      Hematoma of leg      Stented coronary artery      CHF (congestive heart failure)      s/p Carotid Endarterectomy  left          MEDICATIONS  (STANDING):  dextrose 5%. 1000 milliLiter(s) (50 mL/Hr) IV Continuous <Continuous>  dextrose 5%. 1000 milliLiter(s) (100 mL/Hr) IV Continuous <Continuous>  dextrose 50% Injectable 25 Gram(s) IV Push once  dextrose 50% Injectable 12.5 Gram(s) IV Push once  dextrose 50% Injectable 25 Gram(s) IV Push once  furosemide   Injectable 40 milliGRAM(s) IV Push two times a day  glucagon  Injectable 1 milliGRAM(s) IntraMuscular once  insulin glargine Injectable (LANTUS) 10 Unit(s) SubCutaneous at bedtime  insulin lispro (ADMELOG) corrective regimen sliding scale   SubCutaneous three times a day before meals  insulin lispro (ADMELOG) corrective regimen sliding scale   SubCutaneous at bedtime  insulin lispro Injectable (ADMELOG) 11 Unit(s) SubCutaneous three times a day before meals  isosorbide   dinitrate Tablet (ISORDIL) 10 milliGRAM(s) Oral three times a day  metoprolol succinate ER 75 milliGRAM(s) Oral daily  pentoxifylline 400 milliGRAM(s) Oral two times a day  rivaroxaban 15 milliGRAM(s) Oral with dinner  sodium bicarbonate 650 milliGRAM(s) Oral three times a day  spironolactone 50 milliGRAM(s) Oral daily      Allergies    atorvastatin (Muscle Pain (Severe))    Intolerances        SOCIAL HISTORY:  Denies ETOh,Smoking,     FAMILY HISTORY:  Family history of heart disease  father  age 71    FH: atrial fibrillation  sister        REVIEW OF SYSTEMS:    As per HPI  All other review of systems is negative unless indicated above.    VITAL:  T(C): , Max: 36.7 (25 @ 05:23)  T(F): , Max: 98 (25 @ 05:23)  HR: 77 (25 @ 05:23)  BP: 145/82 (25 @ 05:23)  BP(mean): 113 (25 @ 18:29)  RR: 18 (25 @ 05:23)  SpO2: 97% (25 @ 05:23)  Wt(kg): --    I and O's:     @ 07:01  -   @ 07:00  --------------------------------------------------------  IN: 0 mL / OUT: 1100 mL / NET: -1100 mL      Height (cm): 167.6 ( @ 15:03)  Weight (kg): 65.6 ( @ 15:39)  BMI (kg/m2): 23.4 ( @ 15:39)  BSA (m2): 1.74 ( @ 15:39)    PHYSICAL EXAM:    Constitutional: NAD  HEENT: PERRLA, EOMI,  MMM  Neck: No LAD, No JVD  Respiratory: CTAB  Cardiovascular: S1 and S2  Gastrointestinal: BS+, soft, NT/ND  Extremities: + LE edema   Neurological: A/O x 3, no focal deficits  : No Earl      LABS:                        13.9   7.63  )-----------( 228      ( 2025 06:19 )             42.5         139  |  104  |  41[H]  ----------------------------<  73  3.9   |  20[L]  |  1.86[H]    Ca    9.0      2025 06:20  Phos  2.9     -  Mg     2.1         TPro  5.7[L]  /  Alb  3.5  /  TBili  1.6[H]  /  DBili  x   /  AST  42[H]  /  ALT  31  /  AlkPhos  131[H]        Urine Studies:  Urinalysis Basic - ( 2025 06:20 )    Color: x / Appearance: x / SG: x / pH: x  Gluc: 73 mg/dL / Ketone: x  / Bili: x / Urobili: x   Blood: x / Protein: x / Nitrite: x   Leuk Esterase: x / RBC: x / WBC x   Sq Epi: x / Non Sq Epi: x / Bacteria: x            RADIOLOGY & ADDITIONAL STUDIES:        ASSESSMENT:  69M w/ CAD s/p stents, HFrEF, AFib, HTN, T2DM, CKD3 presenting to the hospital with exertional dyspnea. Patient was recently admitted with foot cellulitis, arterial insufficiency, heart failure exacerbation and influenza comes in with SOB    - BARRY likely 2ry to cardiorenal syndrome- improving   - BP controlled  - Hypervolemic        PLAN:   - C/w lasix 40 mg IV BID- No objection to increase if no adequate weight loss and kidney function is stable/improving   - Phosphorus level  - Strict I/O  - Daily weights  - Renal dosing of meds to creatinine clearance of 30 ml/min  - Avoid nephrotoxins as able    Thank you for the courtesy of the referral    Ashley Mayes MD  [facility]- Nephrology  Cell: [phone]

## 2025-02-01 NOTE — PHYSICAL THERAPY INITIAL EVALUATION ADULT - PERTINENT HX OF CURRENT PROBLEM, REHAB EVAL
69M w/ CAD s/p stents, HFrEF, AFib, HTN, T2DM, CKD3 presenting to the hospital with exertional dyspnea. Patient was recently admitted with foot cellulitis, arterial insufficiency, heart failure exacerbation and influenza. States he continued to have shortness of breath following hospital discharge and when he went to his cardiologist's office today, he couldn't walk more than a few steps without becoming winded and became borderline hypoxic, which prompted their office to refer him to the ED. He has had questionable adherence to his medications since discharge. Notes trouble walking around especially going up stairs. Has also had some cough and congestion since discharge.

## 2025-02-01 NOTE — PHYSICAL THERAPY INITIAL EVALUATION ADULT - PLANNED THERAPY INTERVENTIONS, PT EVAL
GOAL: Pt will negotiate up/down 12 steps with R handrail ascending  independently in 2 weeks/gait training/strengthening

## 2025-02-01 NOTE — PROGRESS NOTE ADULT - SUBJECTIVE AND OBJECTIVE BOX
Date of service: 25 @ 22:42      Patient is a 69y old  Male who presents with a chief complaint of HF exacerbation (2025 10:57)                                                               INTERVAL HPI/OVERNIGHT EVENTS:    REVIEW OF SYSTEMS:    no cp   no sob   mild cough                                                                                                                                                                                                                                                                    Medications:  MEDICATIONS  (STANDING):  dextrose 5%. 1000 milliLiter(s) (50 mL/Hr) IV Continuous <Continuous>  dextrose 5%. 1000 milliLiter(s) (100 mL/Hr) IV Continuous <Continuous>  dextrose 50% Injectable 25 Gram(s) IV Push once  dextrose 50% Injectable 12.5 Gram(s) IV Push once  dextrose 50% Injectable 25 Gram(s) IV Push once  furosemide   Injectable 40 milliGRAM(s) IV Push two times a day  glucagon  Injectable 1 milliGRAM(s) IntraMuscular once  heparin  Infusion.  Unit(s)/Hr (12 mL/Hr) IV Continuous <Continuous>  insulin glargine Injectable (LANTUS) 5 Unit(s) SubCutaneous at bedtime  insulin lispro (ADMELOG) corrective regimen sliding scale   SubCutaneous three times a day before meals  insulin lispro (ADMELOG) corrective regimen sliding scale   SubCutaneous at bedtime  insulin lispro Injectable (ADMELOG) 11 Unit(s) SubCutaneous three times a day before meals  isosorbide   dinitrate Tablet (ISORDIL) 10 milliGRAM(s) Oral three times a day  metoprolol succinate ER 75 milliGRAM(s) Oral daily  pentoxifylline 400 milliGRAM(s) Oral two times a day  sodium bicarbonate 650 milliGRAM(s) Oral three times a day  spironolactone 50 milliGRAM(s) Oral daily    MEDICATIONS  (PRN):  dextrose Oral Gel 15 Gram(s) Oral once PRN Blood Glucose LESS THAN 70 milliGRAM(s)/deciliter  heparin   Injectable 5500 Unit(s) IV Push every 6 hours PRN For aPTT less than 40  heparin   Injectable 2500 Unit(s) IV Push every 6 hours PRN For aPTT between 40 - 57       Allergies    atorvastatin (Muscle Pain (Severe))    Intolerances      Vital Signs Last 24 Hrs  T(C): 36.4 (2025 11:15), Max: 36.7 (2025 05:23)  T(F): 97.6 (2025 11:15), Max: 98 (2025 05:23)  HR: 70 (2025 20:58) (70 - 79)  BP: 141/79 (2025 20:58) (137/83 - 145/82)  BP(mean): --  RR: 18 (2025 20:58) (18 - 18)  SpO2: 96% (2025 20:58) (96% - 98%)    Parameters below as of 2025 20:58  Patient On (Oxygen Delivery Method): room air      CAPILLARY BLOOD GLUCOSE      POCT Blood Glucose.: 131 mg/dL (2025 21:18)  POCT Blood Glucose.: 217 mg/dL (2025 16:35)  POCT Blood Glucose.: 121 mg/dL (2025 14:25)  POCT Blood Glucose.: 79 mg/dL (2025 13:24)  POCT Blood Glucose.: 63 mg/dL (2025 12:59)  POCT Blood Glucose.: 65 mg/dL (2025 12:34)  POCT Blood Glucose.: 63 mg/dL (2025 12:32)  POCT Blood Glucose.: 69 mg/dL (2025 11:48)  POCT Blood Glucose.: 63 mg/dL (2025 11:46)  POCT Blood Glucose.: 74 mg/dL (2025 07:51)       @ 07: @ 07:00  --------------------------------------------------------  IN: 0 mL / OUT: 1100 mL / NET: -1100 mL     @ 07: @ 22:42  --------------------------------------------------------  IN: 1120 mL / OUT: 1050 mL / NET: 70 mL      Physical Exam:    Daily     Daily Weight in k.1 (2025 10:34)  General:  Well appearing, NAD, not cachetic  HEENT:  Nonicteric, PERRLA  CV:  RRR, S1S2   Lungs:  CTA B/L, no wheezes, rales, rhonchi  Abdomen:  Soft, non-tender, no distended, positive BS  Extremities:  2+ pulses, no c/c, no edema  Skin:  Warm and dry, no rashes  :  No moreno  Neuro:  AAOx3, non-focal, grossly intact                                                                                                                                                                                                                                                                                                LABS:                               13.9   7.63  )-----------( 228      ( 2025 06:19 )             42.5                      02-    139  |  104  |  41[H]  ----------------------------<  73  3.9   |  20[L]  |  1.86[H]    Ca    9.0      2025 06:20  Phos  2.9     02-  Mg     2.1     02-    TPro  5.7[L]  /  Alb  3.5  /  TBili  1.6[H]  /  DBili  x   /  AST  42[H]  /  ALT  31  /  AlkPhos  131[H]  02-                       RADIOLOGY & ADDITIONAL TESTS         I personally reviewed: [  ]EKG   [  ]CXR    [  ] CT      A/P:         Discussed with :     Derek consultants' Notes   Time spent :   Date of service: 25 @ 22:42      Patient is a 69y old  Male who presents with a chief complaint of HF exacerbation (2025 10:57)                                                               INTERVAL HPI/OVERNIGHT EVENTS:    REVIEW OF SYSTEMS:    no cp   no sob   mild cough                                                                                                                                                                                                                                                                    Medications:  MEDICATIONS  (STANDING):  dextrose 5%. 1000 milliLiter(s) (50 mL/Hr) IV Continuous <Continuous>  dextrose 5%. 1000 milliLiter(s) (100 mL/Hr) IV Continuous <Continuous>  dextrose 50% Injectable 25 Gram(s) IV Push once  dextrose 50% Injectable 12.5 Gram(s) IV Push once  dextrose 50% Injectable 25 Gram(s) IV Push once  furosemide   Injectable 40 milliGRAM(s) IV Push two times a day  glucagon  Injectable 1 milliGRAM(s) IntraMuscular once  heparin  Infusion.  Unit(s)/Hr (12 mL/Hr) IV Continuous <Continuous>  insulin glargine Injectable (LANTUS) 5 Unit(s) SubCutaneous at bedtime  insulin lispro (ADMELOG) corrective regimen sliding scale   SubCutaneous three times a day before meals  insulin lispro (ADMELOG) corrective regimen sliding scale   SubCutaneous at bedtime  insulin lispro Injectable (ADMELOG) 11 Unit(s) SubCutaneous three times a day before meals  isosorbide   dinitrate Tablet (ISORDIL) 10 milliGRAM(s) Oral three times a day  metoprolol succinate ER 75 milliGRAM(s) Oral daily  pentoxifylline 400 milliGRAM(s) Oral two times a day  sodium bicarbonate 650 milliGRAM(s) Oral three times a day  spironolactone 50 milliGRAM(s) Oral daily    MEDICATIONS  (PRN):  dextrose Oral Gel 15 Gram(s) Oral once PRN Blood Glucose LESS THAN 70 milliGRAM(s)/deciliter  heparin   Injectable 5500 Unit(s) IV Push every 6 hours PRN For aPTT less than 40  heparin   Injectable 2500 Unit(s) IV Push every 6 hours PRN For aPTT between 40 - 57       Allergies    atorvastatin (Muscle Pain (Severe))    Intolerances      Vital Signs Last 24 Hrs  T(C): 36.4 (2025 11:15), Max: 36.7 (2025 05:23)  T(F): 97.6 (2025 11:15), Max: 98 (2025 05:23)  HR: 70 (2025 20:58) (70 - 79)  BP: 141/79 (2025 20:58) (137/83 - 145/82)  BP(mean): --  RR: 18 (2025 20:58) (18 - 18)  SpO2: 96% (2025 20:58) (96% - 98%)    Parameters below as of 2025 20:58  Patient On (Oxygen Delivery Method): room air      CAPILLARY BLOOD GLUCOSE      POCT Blood Glucose.: 131 mg/dL (2025 21:18)  POCT Blood Glucose.: 217 mg/dL (2025 16:35)  POCT Blood Glucose.: 121 mg/dL (2025 14:25)  POCT Blood Glucose.: 79 mg/dL (2025 13:24)  POCT Blood Glucose.: 63 mg/dL (2025 12:59)  POCT Blood Glucose.: 65 mg/dL (2025 12:34)  POCT Blood Glucose.: 63 mg/dL (2025 12:32)  POCT Blood Glucose.: 69 mg/dL (2025 11:48)  POCT Blood Glucose.: 63 mg/dL (2025 11:46)  POCT Blood Glucose.: 74 mg/dL (2025 07:51)       @ 07: @ 07:00  --------------------------------------------------------  IN: 0 mL / OUT: 1100 mL / NET: -1100 mL     @ 07: @ 22:42  --------------------------------------------------------  IN: 1120 mL / OUT: 1050 mL / NET: 70 mL      Physical Exam:    Daily     Daily Weight in k.1 (2025 10:34)  General:  Well appearing, NAD, not cachetic  HEENT:  Nonicteric, PERRLA  CV:  RRR, S1S2   Lungs:no wheezing   ronchi   Abdomen:  Soft, non-tender, no distended, positive BS  Extremities: edema    Neuro:  AAOx3, non-focal, grossly intact                                                                                                                                                                                                                                                                                                LABS:                               13.9   7.63  )-----------( 228      ( 2025 06:19 )             42.5                      02-    139  |  104  |  41[H]  ----------------------------<  73  3.9   |  20[L]  |  1.86[H]    Ca    9.0      2025 06:20  Phos  2.9     -  Mg     2.1         TPro  5.7[L]  /  Alb  3.5  /  TBili  1.6[H]  /  DBili  x   /  AST  42[H]  /  ALT  31  /  AlkPhos  131[H]  -                       RADIOLOGY & ADDITIONAL TESTS         I personally reviewed: [  ]EKG   [  ]CXR    [  ] CT      A/P:         Discussed with :     Derek consultants' Notes   Time spent :

## 2025-02-01 NOTE — PHYSICAL THERAPY INITIAL EVALUATION ADULT - ADDITIONAL COMMENTS
as per pt, resides in a PH with spouse, 7 stairs to enter, one floor set up, PTA, pt amb (I), (I) with ADls. Pt states he lives in a house with his wife and mother in law, 6 steps to enter and 1 flight to bedroom/bathroom. Pt reports he was independent prior to admission, +driving, retired.

## 2025-02-01 NOTE — PROGRESS NOTE ADULT - ASSESSMENT
70 y/o male with hx of CAD s/p multiple  stents, HFrEF (EF~50% as of 11/26/23), A-fib, HTN, HLD, T2DM, CKD3, former smoke discharged last week after being admitted for acute on chronic chf and influenza now admitted w worsening chf and near syncope       near syncope : ct pending   check orthostatics     RSV : monitor O2   no wheezing on exam   supportive care        Acute on chronic HFrEF (heart failure with reduced ejection fraction).   - start IV Lasix 40mg bid  - strict I/O, daily weights  - continue home Isordil, Toprol, Aldactone, hold SGLT2i while admitted.     CAD (coronary artery disease).   ·  Plan: Per history has 10 stents  Noted elevated troponin on admission, though less concerning given CKD  Noted was discharged off of any antiplatelets agents following previous admission? - follow up with cardiology  Per history has severe statin intolerance  [facility]     ·  Problem: Chronic atrial fibrillation.   hold xarelto   cont heparin       ·  Problem: History of peripheral vascular disease.   ·  Plan: - on  Trental since last admission per vasc and discussion with cards .. will re address  : cont for now     - was on bid dosing during prior admission, though per vascular surgery was recommended for tid. Continue bid for now.      ·  Problem: Stage 3 chronic kidney disease.   ·  Plan: Creatinine at baseline  Dose meds for CKD3b  Avoid nephrotoxins.     Diabetes type 2.   ·  Plan: During prior admission was getting 10U Lantus qhs and 11U Admelog qac, continue this regimen for now  CC diet  Titrate to goal glucose 140-180.        Severe PAD:  Disease found in the proximal PT and proximal peroneal artery just distal to TP trunk bifurcates. Stents in the PT and peroneal diseased segments.  cont pletal   xzrelto on hold

## 2025-02-01 NOTE — PROVIDER CONTACT NOTE (HYPOGLYCEMIA EVENT) - NS PROVIDER CONTACT ASSESS-HYPO
Pt AAOx4, alert, responsive, VSS, asymptomatic, pt ate full meal this AM, received prandial dose. Pt refused juice stating will wait for lunch.

## 2025-02-01 NOTE — PROVIDER CONTACT NOTE (HYPOGLYCEMIA EVENT) - NS PROVIDER CONTACT BACKGROUND-HYPO
Age: 69y    Gender: Male    POCT Blood Glucose:  121 mg/dL (02-01-25 @ 14:25)  79 mg/dL (02-01-25 @ 13:24)  63 mg/dL (02-01-25 @ 12:59)  65 mg/dL (02-01-25 @ 12:34)  63 mg/dL (02-01-25 @ 12:32)  69 mg/dL (02-01-25 @ 11:48)  63 mg/dL (02-01-25 @ 11:46)  74 mg/dL (02-01-25 @ 07:51)      eMAR:  insulin glargine Injectable (LANTUS)   10 Unit(s) SubCutaneous (01-31-25 @ 21:07)    insulin lispro Injectable (ADMELOG)   11 Unit(s) SubCutaneous (02-01-25 @ 08:27)

## 2025-02-01 NOTE — PHYSICAL THERAPY INITIAL EVALUATION ADULT - STANDING BALANCE: STATIC
fair balance Aklief Pregnancy And Lactation Text: It is unknown if this medication is safe to use during pregnancy.  It is unknown if this medication is excreted in breast milk.  Breastfeeding women should use the topical cream on the smallest area of the skin for the shortest time needed while breastfeeding.  Do not apply to nipple and areola.

## 2025-02-02 LAB
ADD ON TEST-SPECIMEN IN LAB: SIGNIFICANT CHANGE UP
ANION GAP SERPL CALC-SCNC: 15 MMOL/L — SIGNIFICANT CHANGE UP (ref 5–17)
APTT BLD: 141 SEC — CRITICAL HIGH (ref 24.5–35.6)
APTT BLD: 191 SEC — CRITICAL HIGH (ref 24.5–35.6)
APTT BLD: 78.1 SEC — HIGH (ref 24.5–35.6)
BUN SERPL-MCNC: 46 MG/DL — HIGH (ref 7–23)
CALCIUM SERPL-MCNC: 9 MG/DL — SIGNIFICANT CHANGE UP (ref 8.4–10.5)
CHLORIDE SERPL-SCNC: 100 MMOL/L — SIGNIFICANT CHANGE UP (ref 96–108)
CO2 SERPL-SCNC: 20 MMOL/L — LOW (ref 22–31)
CREAT SERPL-MCNC: 2.09 MG/DL — HIGH (ref 0.5–1.3)
EGFR: 34 ML/MIN/1.73M2 — LOW
GLUCOSE BLDC GLUCOMTR-MCNC: 114 MG/DL — HIGH (ref 70–99)
GLUCOSE BLDC GLUCOMTR-MCNC: 138 MG/DL — HIGH (ref 70–99)
GLUCOSE BLDC GLUCOMTR-MCNC: 146 MG/DL — HIGH (ref 70–99)
GLUCOSE BLDC GLUCOMTR-MCNC: 252 MG/DL — HIGH (ref 70–99)
GLUCOSE SERPL-MCNC: 116 MG/DL — HIGH (ref 70–99)
HCT VFR BLD CALC: 41.5 % — SIGNIFICANT CHANGE UP (ref 39–50)
HCT VFR BLD CALC: 43.8 % — SIGNIFICANT CHANGE UP (ref 39–50)
HGB BLD-MCNC: 13.7 G/DL — SIGNIFICANT CHANGE UP (ref 13–17)
HGB BLD-MCNC: 14.5 G/DL — SIGNIFICANT CHANGE UP (ref 13–17)
MCHC RBC-ENTMCNC: 30.5 PG — SIGNIFICANT CHANGE UP (ref 27–34)
MCHC RBC-ENTMCNC: 30.7 PG — SIGNIFICANT CHANGE UP (ref 27–34)
MCHC RBC-ENTMCNC: 33 G/DL — SIGNIFICANT CHANGE UP (ref 32–36)
MCHC RBC-ENTMCNC: 33.1 G/DL — SIGNIFICANT CHANGE UP (ref 32–36)
MCV RBC AUTO: 92 FL — SIGNIFICANT CHANGE UP (ref 80–100)
MCV RBC AUTO: 93 FL — SIGNIFICANT CHANGE UP (ref 80–100)
NRBC # BLD: 0 /100 WBCS — SIGNIFICANT CHANGE UP (ref 0–0)
NRBC # BLD: 0 /100 WBCS — SIGNIFICANT CHANGE UP (ref 0–0)
NRBC BLD-RTO: 0 /100 WBCS — SIGNIFICANT CHANGE UP (ref 0–0)
NRBC BLD-RTO: 0 /100 WBCS — SIGNIFICANT CHANGE UP (ref 0–0)
PHOSPHATE SERPL-MCNC: 3.1 MG/DL — SIGNIFICANT CHANGE UP (ref 2.5–4.5)
PLATELET # BLD AUTO: 234 K/UL — SIGNIFICANT CHANGE UP (ref 150–400)
PLATELET # BLD AUTO: 263 K/UL — SIGNIFICANT CHANGE UP (ref 150–400)
POTASSIUM SERPL-MCNC: 3.5 MMOL/L — SIGNIFICANT CHANGE UP (ref 3.5–5.3)
POTASSIUM SERPL-SCNC: 3.5 MMOL/L — SIGNIFICANT CHANGE UP (ref 3.5–5.3)
RBC # BLD: 4.46 M/UL — SIGNIFICANT CHANGE UP (ref 4.2–5.8)
RBC # BLD: 4.76 M/UL — SIGNIFICANT CHANGE UP (ref 4.2–5.8)
RBC # FLD: 13.9 % — SIGNIFICANT CHANGE UP (ref 10.3–14.5)
RBC # FLD: 14 % — SIGNIFICANT CHANGE UP (ref 10.3–14.5)
SODIUM SERPL-SCNC: 135 MMOL/L — SIGNIFICANT CHANGE UP (ref 135–145)
WBC # BLD: 8.19 K/UL — SIGNIFICANT CHANGE UP (ref 3.8–10.5)
WBC # BLD: 9.28 K/UL — SIGNIFICANT CHANGE UP (ref 3.8–10.5)
WBC # FLD AUTO: 8.19 K/UL — SIGNIFICANT CHANGE UP (ref 3.8–10.5)
WBC # FLD AUTO: 9.28 K/UL — SIGNIFICANT CHANGE UP (ref 3.8–10.5)

## 2025-02-02 PROCEDURE — 99222 1ST HOSP IP/OBS MODERATE 55: CPT

## 2025-02-02 PROCEDURE — 70450 CT HEAD/BRAIN W/O DYE: CPT | Mod: 26

## 2025-02-02 RX ORDER — POTASSIUM CHLORIDE 750 MG/1
40 TABLET, EXTENDED RELEASE ORAL ONCE
Refills: 0 | Status: COMPLETED | OUTPATIENT
Start: 2025-02-02 | End: 2025-02-02

## 2025-02-02 RX ADMIN — Medication 75 MILLIGRAM(S): at 06:06

## 2025-02-02 RX ADMIN — Medication 11 UNIT(S): at 08:12

## 2025-02-02 RX ADMIN — Medication 800 UNIT(S)/HR: at 11:06

## 2025-02-02 RX ADMIN — INSULIN GLARGINE-YFGN 5 UNIT(S): 100 INJECTION, SOLUTION SUBCUTANEOUS at 21:46

## 2025-02-02 RX ADMIN — SODIUM BICARBONATE 650 MILLIGRAM(S): 42 INJECTION, SOLUTION INTRAVENOUS at 21:46

## 2025-02-02 RX ADMIN — Medication 1000 UNIT(S)/HR: at 02:54

## 2025-02-02 RX ADMIN — Medication 400 MILLIGRAM(S): at 06:06

## 2025-02-02 RX ADMIN — Medication 0 UNIT(S)/HR: at 01:37

## 2025-02-02 RX ADMIN — Medication 3: at 16:59

## 2025-02-02 RX ADMIN — POTASSIUM CHLORIDE 40 MILLIEQUIVALENT(S): 750 TABLET, EXTENDED RELEASE ORAL at 09:54

## 2025-02-02 RX ADMIN — ISOSORBIDE DINITRATE 10 MILLIGRAM(S): 40 TABLET ORAL at 16:12

## 2025-02-02 RX ADMIN — ISOSORBIDE DINITRATE 10 MILLIGRAM(S): 40 TABLET ORAL at 06:06

## 2025-02-02 RX ADMIN — Medication 50 MILLIGRAM(S): at 06:07

## 2025-02-02 RX ADMIN — SODIUM BICARBONATE 650 MILLIGRAM(S): 42 INJECTION, SOLUTION INTRAVENOUS at 06:06

## 2025-02-02 RX ADMIN — SODIUM BICARBONATE 650 MILLIGRAM(S): 42 INJECTION, SOLUTION INTRAVENOUS at 13:13

## 2025-02-02 RX ADMIN — Medication 40 MILLIGRAM(S): at 06:06

## 2025-02-02 RX ADMIN — Medication 800 UNIT(S)/HR: at 17:42

## 2025-02-02 RX ADMIN — Medication 11 UNIT(S): at 16:59

## 2025-02-02 RX ADMIN — ISOSORBIDE DINITRATE 10 MILLIGRAM(S): 40 TABLET ORAL at 11:07

## 2025-02-02 RX ADMIN — Medication 0 UNIT(S)/HR: at 09:58

## 2025-02-02 RX ADMIN — Medication 40 MILLIGRAM(S): at 13:12

## 2025-02-02 NOTE — DIETITIAN INITIAL EVALUATION ADULT - ORAL INTAKE PTA/DIET HISTORY
Pt reports having a good appetite and PO intake PTA. Follows regular diet, attempts to limit sugar and salt in diet. Pt denies any known food allergies or intolerances. No micronutrient supplementation at home. Denies any difficulty chewing/swallowing at this time.

## 2025-02-02 NOTE — DIETITIAN INITIAL EVALUATION ADULT - PROBLEM SELECTOR PLAN 1
Suspect heart failure exacerbation secondary to RSV given LE edema, dyspnea, proBNP elevation  - start IV Lasix 40mg bid  - strict I/O, daily weights  - continue home Isordil, Toprol, Aldactone, hold SGLT2i while admitted

## 2025-02-02 NOTE — DIETITIAN INITIAL EVALUATION ADULT - ETIOLOGY
Increased physiological demand for nutrients  limited prior exposure to nutrition related diet education for HF

## 2025-02-02 NOTE — DIETITIAN INITIAL EVALUATION ADULT - PROBLEM SELECTOR PLAN 2
Per history has 10 stents  Noted elevated troponin on admission, though less concerning given CKD  Noted was discharged off of any antiplatelets agents following previous admission? - follow up with cardiology  Per history has severe statin intolerance  Per ED note, may go for LHC this admission

## 2025-02-02 NOTE — DIETITIAN INITIAL EVALUATION ADULT - PERTINENT MEDS FT
MEDICATIONS  (STANDING):  dextrose 5%. 1000 milliLiter(s) (50 mL/Hr) IV Continuous <Continuous>  dextrose 5%. 1000 milliLiter(s) (100 mL/Hr) IV Continuous <Continuous>  dextrose 50% Injectable 25 Gram(s) IV Push once  dextrose 50% Injectable 12.5 Gram(s) IV Push once  dextrose 50% Injectable 25 Gram(s) IV Push once  furosemide   Injectable 40 milliGRAM(s) IV Push two times a day  glucagon  Injectable 1 milliGRAM(s) IntraMuscular once  heparin  Infusion.  Unit(s)/Hr (12 mL/Hr) IV Continuous <Continuous>  insulin glargine Injectable (LANTUS) 5 Unit(s) SubCutaneous at bedtime  insulin lispro (ADMELOG) corrective regimen sliding scale   SubCutaneous three times a day before meals  insulin lispro (ADMELOG) corrective regimen sliding scale   SubCutaneous at bedtime  insulin lispro Injectable (ADMELOG) 11 Unit(s) SubCutaneous three times a day before meals  isosorbide   dinitrate Tablet (ISORDIL) 10 milliGRAM(s) Oral three times a day  metoprolol succinate ER 75 milliGRAM(s) Oral daily  pentoxifylline 400 milliGRAM(s) Oral two times a day  sodium bicarbonate 650 milliGRAM(s) Oral three times a day  spironolactone 50 milliGRAM(s) Oral daily    MEDICATIONS  (PRN):  dextrose Oral Gel 15 Gram(s) Oral once PRN Blood Glucose LESS THAN 70 milliGRAM(s)/deciliter  heparin   Injectable 5500 Unit(s) IV Push every 6 hours PRN For aPTT less than 40  heparin   Injectable 2500 Unit(s) IV Push every 6 hours PRN For aPTT between 40 - 57

## 2025-02-02 NOTE — DIETITIAN INITIAL EVALUATION ADULT - NSFNSGIIOFT_GEN_A_CORE
Pt denies any current N/V/D/C. Per chart, no BM x admission documented. No current bowel regimen in place.

## 2025-02-02 NOTE — CONSULT NOTE ADULT - SUBJECTIVE AND OBJECTIVE BOX
70 yo male former smoker with history of HTN, HLD, DM, CKD3, CAD s/p PCI, HFrEF, afib on Xarelto, remote L CEA, and known PAD s/p recent LLE angio with PT and peroneal stent placement (1/23) presenting with heart failure exacerbation.    PAST MEDICAL HISTORY: Carotid Stenosis    Diabetes Mellitus    Neuropathy    Former smoker    CAD in native artery    Type 2 diabetes mellitus    Hyperlipidemia, unspecified hyperlipidemia type    Essential hypertension, hypertension with unspecified goal    Afib    Hematoma of leg    Stented coronary artery    CHF (congestive heart failure)        PAST SURGICAL HISTORY: No Past Surgical History    s/p Carotid Endarterectomy        HOME MEDICATIONS:    ALLERGIES: atorvastatin (Muscle Pain (Severe))      FAMILY HISTORY: Family history of heart disease    FH: atrial fibrillation        SOCIAL HISTORY:    REVIEW OF SYSTEMS:    VITAL SIGNS:  ICU Vital Signs Last 24 Hrs  T(C): 36.4 (02 Feb 2025 12:09), Max: 36.7 (02 Feb 2025 04:10)  T(F): 97.5 (02 Feb 2025 12:09), Max: 98 (02 Feb 2025 04:10)  HR: 77 (02 Feb 2025 12:09) (68 - 77)  BP: 123/77 (02 Feb 2025 12:09) (123/77 - 141/79)  BP(mean): --  ABP: --  ABP(mean): --  RR: 18 (02 Feb 2025 12:09) (18 - 18)  SpO2: 98% (02 Feb 2025 12:09) (96% - 98%)    O2 Parameters below as of 02 Feb 2025 12:09  Patient On (Oxygen Delivery Method): room air            PHYSICAL EXAMINATION:  General - well-nourished, no acute distress  Neuro - awake, alert, oriented x4, no acute focal deficits  HEENT - normocephalic, PERRL, moist mucous membranes  Lungs - clear to auscultation bilaterally, right chest wall tenderness  Heart - regular rate and rhythm, S1S2 / irregularly irregular  Abdomen - soft, nontender, nondistended  Extremities - all four extremities are warm & pink with 2+ pulses, strength 5/5, sensation intact    LABS:                          14.5   8.19  )-----------( 263      ( 02 Feb 2025 06:00 )             43.8       02-02    135  |  100  |  46[H]  ----------------------------<  116[H]  3.5   |  20[L]  |  2.09[H]    Ca    9.0      02 Feb 2025 06:00  Phos  3.1     02-02  Mg     2.1     02-01    TPro  5.7[L]  /  Alb  3.5  /  TBili  1.6[H]  /  DBili  x   /  AST  42[H]  /  ALT  31  /  AlkPhos  131[H]  02-01      PTT - ( 02 Feb 2025 09:30 )  PTT:141.0 sec    ABG - ( 31 Jan 2025 18:48 )  pH: x     /  pCO2: x     /  pO2: x     / HCO3: x     / Base Excess: x     /  SaO2: x       Lactate: 1.3                VBG - ( 31 Jan 2025 15:33 )  pH: 7.29  /  pCO2: 46    /  pO2: 21    / HCO3: 22    / Base Excess: -4.7  /  SaO2: 18.5   Lactate: 2.1                RECENT CULTURES:      CAPILLARY BLOOD GLUCOSE      POCT Blood Glucose.: 114 mg/dL (02 Feb 2025 11:56)  POCT Blood Glucose.: 138 mg/dL (02 Feb 2025 07:58)  POCT Blood Glucose.: 131 mg/dL (01 Feb 2025 21:18)  POCT Blood Glucose.: 217 mg/dL (01 Feb 2025 16:35)  POCT Blood Glucose.: 121 mg/dL (01 Feb 2025 14:25)  POCT Blood Glucose.: 79 mg/dL (01 Feb 2025 13:24)      IMAGING STUDIES: Mr. Velázquez is a 69 year old man, former smoker with history of HTN, HLD, DM, CKD3, CAD s/p PCI, HFrEF, afib on Xarelto, remote L CEA, and known PAD s/p recent LLE angio with PT and peroneal stent placement (1/23) presenting with heart failure exacerbation. Patient reports that his wound is healing since the angio last week. Has mild pain and on the dorsal surface of his foot when he walks.. Reports stable erythema. Reports that his shortness of breath has improved since admission, now only present when he goes up stairs. He has been taking Xarelto as prescribed since discharge and is now transitioned to hep gtt. Does not know if he has been taking Trental.    PAST MEDICAL HISTORY: Carotid Stenosis    Diabetes Mellitus    Neuropathy    Former smoker    CAD in native artery    Type 2 diabetes mellitus    Hyperlipidemia, unspecified hyperlipidemia type    Essential hypertension, hypertension with unspecified goal    Afib    Hematoma of leg    Stented coronary artery    CHF (congestive heart failure)        PAST SURGICAL HISTORY: No Past Surgical History    s/p Carotid Endarterectomy        HOME MEDICATIONS:    ALLERGIES: atorvastatin (Muscle Pain (Severe))      FAMILY HISTORY: Family history of heart disease    FH: atrial fibrillation        SOCIAL HISTORY:    REVIEW OF SYSTEMS:    VITAL SIGNS:  ICU Vital Signs Last 24 Hrs  T(C): 36.4 (02 Feb 2025 12:09), Max: 36.7 (02 Feb 2025 04:10)  T(F): 97.5 (02 Feb 2025 12:09), Max: 98 (02 Feb 2025 04:10)  HR: 77 (02 Feb 2025 12:09) (68 - 77)  BP: 123/77 (02 Feb 2025 12:09) (123/77 - 141/79)  BP(mean): --  ABP: --  ABP(mean): --  RR: 18 (02 Feb 2025 12:09) (18 - 18)  SpO2: 98% (02 Feb 2025 12:09) (96% - 98%)    O2 Parameters below as of 02 Feb 2025 12:09  Patient On (Oxygen Delivery Method): room air            PHYSICAL EXAMINATION:  General - well-nourished, no acute distress  Neuro - awake, alert, oriented x4  Lungs - breathing comfortably on room air  Heart - pulse regular  Abdomen - soft, nontender, nondistended  Extremities - right groin soft, old ecchymosis present; LLE warm, ulcer on dorsal foot clean and dry; strength and sensation intact; no palpable pedal pulses     LABS:                          14.5   8.19  )-----------( 263      ( 02 Feb 2025 06:00 )             43.8       02-02    135  |  100  |  46[H]  ----------------------------<  116[H]  3.5   |  20[L]  |  2.09[H]    Ca    9.0      02 Feb 2025 06:00  Phos  3.1     02-02  Mg     2.1     02-01    TPro  5.7[L]  /  Alb  3.5  /  TBili  1.6[H]  /  DBili  x   /  AST  42[H]  /  ALT  31  /  AlkPhos  131[H]  02-01      PTT - ( 02 Feb 2025 09:30 )  PTT:141.0 sec    ABG - ( 31 Jan 2025 18:48 )  pH: x     /  pCO2: x     /  pO2: x     / HCO3: x     / Base Excess: x     /  SaO2: x       Lactate: 1.3                VBG - ( 31 Jan 2025 15:33 )  pH: 7.29  /  pCO2: 46    /  pO2: 21    / HCO3: 22    / Base Excess: -4.7  /  SaO2: 18.5   Lactate: 2.1                RECENT CULTURES:      CAPILLARY BLOOD GLUCOSE      POCT Blood Glucose.: 114 mg/dL (02 Feb 2025 11:56)  POCT Blood Glucose.: 138 mg/dL (02 Feb 2025 07:58)  POCT Blood Glucose.: 131 mg/dL (01 Feb 2025 21:18)  POCT Blood Glucose.: 217 mg/dL (01 Feb 2025 16:35)  POCT Blood Glucose.: 121 mg/dL (01 Feb 2025 14:25)  POCT Blood Glucose.: 79 mg/dL (01 Feb 2025 13:24)      IMAGING STUDIES:

## 2025-02-02 NOTE — DIETITIAN INITIAL EVALUATION ADULT - ADD RECOMMEND
1) Recommend change to Consistent Carbohydrate, Low Sodium diet as tolerated.   	- Fluid restriction deferred to team.  	- Defer texture/consistency to SLP/team.   2) Recommend nephro-daniel daily to promote wound healing pending no medical contraindications.  3) Continue to monitor PO intake, weight, labs, skin, GI status, and diet.

## 2025-02-02 NOTE — DIETITIAN INITIAL EVALUATION ADULT - PROBLEM SELECTOR PLAN 4
- continue Trental - was on bid dosing during prior admission, though per vascular surgery was recommended for tid. Continue bid for now. Imaging Studies

## 2025-02-02 NOTE — DIETITIAN INITIAL EVALUATION ADULT - PERTINENT LABORATORY DATA
02-02    135  |  100  |  46[H]  ----------------------------<  116[H]  3.5   |  20[L]  |  2.09[H]    Ca    9.0      02 Feb 2025 06:00  Phos  3.1     02-02  Mg     2.1     02-01    TPro  5.7[L]  /  Alb  3.5  /  TBili  1.6[H]  /  DBili  x   /  AST  42[H]  /  ALT  31  /  AlkPhos  131[H]  02-01  POCT Blood Glucose.: 138 mg/dL (02-02-25 @ 07:58)  A1C with Estimated Average Glucose Result: 6.5 % (12-05-24 @ 06:21)  A1C with Estimated Average Glucose Result: 6.4 % (12-04-24 @ 06:44)  A1C with Estimated Average Glucose Result: 7.1 % (06-23-24 @ 06:20)

## 2025-02-02 NOTE — PROGRESS NOTE ADULT - ASSESSMENT
68 y/o male with hx of CAD s/p multiple  stents, HFrEF (EF~50% as of 11/26/23), A-fib, HTN, HLD, T2DM, CKD3, former smoke discharged last week after being admitted for acute on chronic chf and influenza now admitted w worsening chf and near syncope       near syncope : ct pending   check orthostatics : neg     RSV : monitor O2   no wheezing on exam   supportive care        Acute on chronic HFrEF (heart failure with reduced ejection fraction).   cont diuresis      CAD (coronary artery disease).   cont cardiac meds   fu cards     ·  Problem: Chronic atrial fibrillation.   hold xarelto   cont heparin for now       ·  Problem: History of peripheral vascular disease.   off trental   d/w vasc:  add aspirin     ·  Problem: Stage 3 chronic kidney disease.   ·  Plan: Creatinine at baseline  Dose meds for CKD3b  Avoid nephrotoxins.     Diabetes type 2.   monitor FS

## 2025-02-02 NOTE — DIETITIAN INITIAL EVALUATION ADULT - OTHER INFO
Post-Op Assessment Note            No anethesia notable event occurred.            Last Filed PACU Vitals:  Vitals Value Taken Time   Temp     Pulse 56 12/30/24 1038   /71 12/30/24 1038   Resp 18 12/30/24 1038   SpO2 95 % 12/30/24 1038       Modified Gomez:  Activity: 2 (12/30/2024 10:38 AM)  Respiration: 2 (12/30/2024 10:38 AM)  Circulation: 2 (12/30/2024 10:38 AM)  Consciousness: 2 (12/30/2024 10:38 AM)  Oxygen Saturation: 2 (12/30/2024 10:38 AM)  Modified Gomez Score: 10 (12/30/2024 10:38 AM)         Post-Op Assessment Note    CV Status:  Stable  Pain Score: 0    Pain management: adequate       Mental Status:  Alert and awake   Hydration Status:  Stable   PONV Controlled:  None   Airway Patency:  Patent and adequate     Post Op Vitals Reviewed: Yes    No anethesia notable event occurred.    Staff: CRNA       Last Filed PACU Vitals:  Vitals Value Taken Time   Temp     Pulse     BP     Resp     SpO2         Modified Gomez:  No data recorded       4 = No assist / stand by assistance - Wt hx:         - UBW: <130 pounds per pt         - Dosing wt: 144 pounds (1/31)         - Wt hx per chart in pounds: 132.4 (1/01, standing), 131.6 (2/02, standing); Wt hx per [facility] HIE in pounds: 130 (12/10/24), 126 (6/22/24), 125 (5/27/24).   	-- Weight fluctuations possible partially in setting of fluid shifts (pt with HF, on diuretics, mild edema).           - RD to continue to monitor weight trends as able.   - Nutritionally Pertinent Meds in-house: IVF, sodium bicarb.     - Endo:  	- T2DM; regimen PTA: insulin Lispro, Glargine (per outpatient medications list).   	- A1c 6.5% (12/5/24) - indicates good glycemic control.  	- Lantus, Admelog, SSI ordered in-house.      - Cardio:  	- HF exacerbation.  	- Diuresing with IV Lasix, PO Aldactone.     - Neph:  	- BARRY on CKD3.

## 2025-02-02 NOTE — DIETITIAN INITIAL EVALUATION ADULT - REASON INDICATOR FOR ASSESSMENT
Nutrition Consult for Decompensated HF, salt and fluid restriction + pressure injury stage 2 or >.   Source: Pt, Electronic Medical Record.   Chart reviewed, events noted.

## 2025-02-02 NOTE — DIETITIAN INITIAL EVALUATION ADULT - ENERGY INTAKE
Adequate (%) In house, pt reports good appetite and PO intake, reports enjoying meals and finishing most. No PO intake information available per flowsheets at this time.

## 2025-02-02 NOTE — PROGRESS NOTE ADULT - ASSESSMENT
oob to chair  on room air  afebrile  states she feels better    dextrose 5%. 1000 milliLiter(s) IV Continuous <Continuous>  dextrose 5%. 1000 milliLiter(s) IV Continuous <Continuous>  dextrose 50% Injectable 25 Gram(s) IV Push once  dextrose 50% Injectable 12.5 Gram(s) IV Push once  dextrose 50% Injectable 25 Gram(s) IV Push once  dextrose Oral Gel 15 Gram(s) Oral once PRN  furosemide   Injectable 40 milliGRAM(s) IV Push two times a day  glucagon  Injectable 1 milliGRAM(s) IntraMuscular once  heparin   Injectable 5500 Unit(s) IV Push every 6 hours PRN  heparin   Injectable 2500 Unit(s) IV Push every 6 hours PRN  heparin  Infusion.  Unit(s)/Hr IV Continuous <Continuous>  insulin glargine Injectable (LANTUS) 5 Unit(s) SubCutaneous at bedtime  insulin lispro (ADMELOG) corrective regimen sliding scale   SubCutaneous three times a day before meals  insulin lispro (ADMELOG) corrective regimen sliding scale   SubCutaneous at bedtime  insulin lispro Injectable (ADMELOG) 11 Unit(s) SubCutaneous three times a day before meals  isosorbide   dinitrate Tablet (ISORDIL) 10 milliGRAM(s) Oral three times a day  metoprolol succinate ER 75 milliGRAM(s) Oral daily  pentoxifylline 400 milliGRAM(s) Oral two times a day  sodium bicarbonate 650 milliGRAM(s) Oral three times a day  spironolactone 50 milliGRAM(s) Oral daily      VITAL:  T(C): , Max: 36.7 (02-02-25 @ 04:10)  T(F): , Max: 98 (02-02-25 @ 04:10)  HR: 68 (02-02-25 @ 04:10)  BP: 137/84 (02-02-25 @ 04:10)  BP(mean): --  RR: 18 (02-02-25 @ 04:10)  SpO2: 98% (02-02-25 @ 04:10)  Wt(kg): --    02-01-25 @ 07:01  -  02-02-25 @ 07:00  --------------------------------------------------------  IN: 1322 mL / OUT: 2625 mL / NET: -1303 mL    02-02-25 @ 07:01  -  02-02-25 @ 11:36  --------------------------------------------------------  IN: 0 mL / OUT: 600 mL / NET: -600 mL        PHYSICAL EXAM:  Constitutional: NAD  HEENT: PERRLA, EOMI,  MMM  Neck: No LAD, No JVD  Respiratory: CTAB  Cardiovascular: S1 and S2  Gastrointestinal: BS+, soft, NT/ND  Extremities: + LE edema   Neurological: A/O x 3, no focal deficits  : No Earl      LABS:                          14.5   8.19  )-----------( 263      ( 02 Feb 2025 06:00 )             43.8     Na(135)/K(3.5)/Cl(100)/HCO3(20)/BUN(46)/Cr(2.09)Glu(116)/Ca(9.0)/Mg(--)/PO4(3.1)    02-02 @ 06:00  Na(139)/K(3.9)/Cl(104)/HCO3(20)/BUN(41)/Cr(1.86)Glu(73)/Ca(9.0)/Mg(2.1)/PO4(2.9)    02-01 @ 06:20  Na(135)/K(4.9)/Cl(99)/HCO3(20)/BUN(44)/Cr(1.95)Glu(234)/Ca(9.6)/Mg(--)/PO4(--)    01-31 @ 16:14    Urinalysis Basic - ( 02 Feb 2025 06:00 )    Color: x / Appearance: x / SG: x / pH: x  Gluc: 116 mg/dL / Ketone: x  / Bili: x / Urobili: x   Blood: x / Protein: x / Nitrite: x   Leuk Esterase: x / RBC: x / WBC x   Sq Epi: x / Non Sq Epi: x / Bacteria: x                ASSESSMENT:  69M w/ CAD s/p stents, HFrEF, AFib, HTN, T2DM, CKD3 presenting to the hospital with exertional dyspnea. Patient was recently admitted with foot cellulitis, arterial insufficiency, heart failure exacerbation and influenza comes in with SOB    - BARRY likely 2ry to cardiorenal syndrome-  scr up relative to yesterday but overall improvement  - Lytes controlled  - BP acceptable  - Hypervolemic        PLAN:   - C/w lasix 40 mg IV BID- No objection to increase if no adequate weight loss and kidney function is stable/improving   - Strict I/O  - Daily weights  - Renal dosing of meds to creatinine clearance of 30 ml/min  - Avoid nephrotoxins as able      Justin Reynaga NP-BC  [employer]  [phone]

## 2025-02-02 NOTE — DIETITIAN INITIAL EVALUATION ADULT - NS FNS DIET ORDER
Diet, Regular:   Consistent Carbohydrate {No Snacks} (CSTCHO)  DASH/TLC {Sodium & Cholesterol Restricted} (DASH)  1500mL Fluid Restriction ([id number])  Low Sodium (01-31-25 @ 19:09) [Active]

## 2025-02-02 NOTE — DIETITIAN INITIAL EVALUATION ADULT - OTHER CALCULATIONS
Fluid needs deferred to team.  Estimated energy needs using dosing wt / estimated protein needs using dryest wt in-house (131.6 lb on 2/02) with consideration for HF exacerbation.

## 2025-02-02 NOTE — PROGRESS NOTE ADULT - SUBJECTIVE AND OBJECTIVE BOX
Date of service: 25 @ 20:44      Patient is a 69y old  Male who presents with a chief complaint of HF exacerbation (2025 13:21)                                                               INTERVAL HPI/OVERNIGHT EVENTS:    REVIEW OF SYSTEMS:    still with sob on exertion   mild cough   no cp                                                                                                                                                                                                                                                                        Medications:  MEDICATIONS  (STANDING):  dextrose 5%. 1000 milliLiter(s) (50 mL/Hr) IV Continuous <Continuous>  dextrose 5%. 1000 milliLiter(s) (100 mL/Hr) IV Continuous <Continuous>  dextrose 50% Injectable 25 Gram(s) IV Push once  dextrose 50% Injectable 12.5 Gram(s) IV Push once  dextrose 50% Injectable 25 Gram(s) IV Push once  furosemide   Injectable 40 milliGRAM(s) IV Push two times a day  glucagon  Injectable 1 milliGRAM(s) IntraMuscular once  heparin  Infusion.  Unit(s)/Hr (12 mL/Hr) IV Continuous <Continuous>  insulin glargine Injectable (LANTUS) 5 Unit(s) SubCutaneous at bedtime  insulin lispro (ADMELOG) corrective regimen sliding scale   SubCutaneous three times a day before meals  insulin lispro (ADMELOG) corrective regimen sliding scale   SubCutaneous at bedtime  insulin lispro Injectable (ADMELOG) 11 Unit(s) SubCutaneous three times a day before meals  isosorbide   dinitrate Tablet (ISORDIL) 10 milliGRAM(s) Oral three times a day  metoprolol succinate ER 75 milliGRAM(s) Oral daily  sodium bicarbonate 650 milliGRAM(s) Oral three times a day  spironolactone 50 milliGRAM(s) Oral daily    MEDICATIONS  (PRN):  dextrose Oral Gel 15 Gram(s) Oral once PRN Blood Glucose LESS THAN 70 milliGRAM(s)/deciliter  heparin   Injectable 5500 Unit(s) IV Push every 6 hours PRN For aPTT less than 40  heparin   Injectable 2500 Unit(s) IV Push every 6 hours PRN For aPTT between 40 - 57       Allergies    atorvastatin (Muscle Pain (Severe))    Intolerances      Vital Signs Last 24 Hrs  T(C): 36.4 (2025 12:09), Max: 36.7 (2025 04:10)  T(F): 97.5 (2025 12:09), Max: 98 (2025 04:10)  HR: 77 (2025 12:09) (68 - 77)  BP: 123/77 (2025 12:09) (123/77 - 141/79)  BP(mean): --  RR: 18 (2025 12:09) (18 - 18)  SpO2: 98% (2025 12:09) (96% - 98%)    Parameters below as of 2025 12:09  Patient On (Oxygen Delivery Method): room air      CAPILLARY BLOOD GLUCOSE      POCT Blood Glucose.: 252 mg/dL (2025 16:45)  POCT Blood Glucose.: 114 mg/dL (2025 11:56)  POCT Blood Glucose.: 138 mg/dL (2025 07:58)  POCT Blood Glucose.: 131 mg/dL (2025 21:18)       @ 07:01  -   @ 07:00  --------------------------------------------------------  IN: 1322 mL / OUT: 2625 mL / NET: -1303 mL     @ 07:01  -   @ 20:44  --------------------------------------------------------  IN: 0 mL / OUT: 1800 mL / NET: -1800 mL      Physical Exam:    Daily     Daily Weight in k.7 (2025 08:15)  General:  Well appearing, NAD, not cachetic  HEENT:  Nonicteric, PERRLA  CV:  RRR, S1S2   Lungs:  no crackels   Abdomen:  Soft, non-tender, no distended, positive BS  Extremities: edema   Neuro:  AAOx3, non-focal, grossly intact                                                                                                                                                                                                                                                                                                LABS:                               14.5   8.19  )-----------( 263      ( 2025 06:00 )             43.8                          135  |  100  |  46[H]  ----------------------------<  116[H]  3.5   |  20[L]  |  2.09[H]    Ca    9.0      2025 06:00  Phos  3.1       Mg     2.1         TPro  5.7[L]  /  Alb  3.5  /  TBili  1.6[H]  /  DBili  x   /  AST  42[H]  /  ALT  31  /  AlkPhos  131[H]                         RADIOLOGY & ADDITIONAL TESTS         I personally reviewed: [  ]EKG   [  ]CXR    [  ] CT      A/P:         Discussed with :     Derek consultants' Notes   Time spent :

## 2025-02-02 NOTE — CONSULT NOTE ADULT - ASSESSMENT
68 yo male former smoker with history of HTN, HLD, DM, CKD3, CAD s/p PCI, HFrEF, afib on Xarelto, remote L CEA, and known PAD s/p recent LLE angio with PT and peroneal stent placement (1/23) presenting with heart failure exacerbation.    Recommendations:  - agree with anticoagulation  - okay to stop trental if contraindicated  - no acute concern for LLE wound    to be discussed with fellow     Vascular Surgery   [id number] Mr. Velázquez is a 69 year old man, former smoker with history of HTN, HLD, DM, CKD3, CAD s/p PCI, HFrEF, afib on Xarelto, remote L CEA, and known PAD s/p recent LLE angio with PT and peroneal stent placement (1/23) presenting with heart failure exacerbation.    Recommendations:  - agree with anticoagulation  - okay to stop trental if contraindicated  - no acute concerns for LLE wound, continue to monitor    discussed with fellow on behalf of Dr. Calle    Vascular Surgery   [id number] Mr. Velázquez is a 69 year old man, former smoker with history of HTN, HLD, DM, CKD3, CAD s/p PCI, HFrEF, afib on Xarelto, remote L CEA, and known PAD s/p recent LLE angio with PT and peroneal stent placement (1/23) presenting with heart failure exacerbation.    Recommendations:  - agree with anticoagulation  - okay to stop trental if contraindicated, recommend aspirin for recent LLE stents  - no acute concerns for LLE wound, continue to monitor    discussed with fellow on behalf of Dr. Fernandez    Vascular Surgery   [id number] Mr. Velázquez is a 69 year old man, former smoker with history of HTN, HLD, DM, CKD3, CAD s/p PCI, HFrEF, afib on Xarelto, remote L CEA, and known PAD s/p recent LLE angio with PT and peroneal stent placement (1/23) presenting with heart failure exacerbation.    Recommendations:  - okay to stop trental if contraindicated, recommend aspirin in addition to hep gtt for recent LLE stents  - no acute concerns for LLE wound, continue to monitor    discussed with fellow on behalf of Dr. Fernandez    Vascular Surgery   [id number]

## 2025-02-02 NOTE — PROGRESS NOTE ADULT - SUBJECTIVE AND OBJECTIVE BOX
Resp status is somewhat improved  Edema also somewhat improved  /84  HR 68  Bilateral crackles (improved)  Irregular rhythm 2/6 systolic murmur  1+ edema    BUN 46  Crt 2.09  CBC  normal    Imp:  CHF with non operable and non intervenable CAD with EF 40-45%   CHF is somewhat improved with IV Lasix   Renal function is stable  Would not proceed coronary angiography at this time as the patient has acute RSV and the yield is quite low  Would stop Trental as it is contra indicated in CHF / decreased EF

## 2025-02-03 LAB
ANION GAP SERPL CALC-SCNC: 13 MMOL/L — SIGNIFICANT CHANGE UP (ref 5–17)
APTT BLD: 91.7 SEC — HIGH (ref 24.5–35.6)
BUN SERPL-MCNC: 50 MG/DL — HIGH (ref 7–23)
CALCIUM SERPL-MCNC: 9.9 MG/DL — SIGNIFICANT CHANGE UP (ref 8.4–10.5)
CHLORIDE SERPL-SCNC: 98 MMOL/L — SIGNIFICANT CHANGE UP (ref 96–108)
CO2 SERPL-SCNC: 23 MMOL/L — SIGNIFICANT CHANGE UP (ref 22–31)
CREAT SERPL-MCNC: 2.18 MG/DL — HIGH (ref 0.5–1.3)
EGFR: 32 ML/MIN/1.73M2 — LOW
GLUCOSE BLDC GLUCOMTR-MCNC: 129 MG/DL — HIGH (ref 70–99)
GLUCOSE BLDC GLUCOMTR-MCNC: 180 MG/DL — HIGH (ref 70–99)
GLUCOSE BLDC GLUCOMTR-MCNC: 182 MG/DL — HIGH (ref 70–99)
GLUCOSE BLDC GLUCOMTR-MCNC: 255 MG/DL — HIGH (ref 70–99)
GLUCOSE SERPL-MCNC: 192 MG/DL — HIGH (ref 70–99)
HCT VFR BLD CALC: 48.4 % — SIGNIFICANT CHANGE UP (ref 39–50)
HGB BLD-MCNC: 15.6 G/DL — SIGNIFICANT CHANGE UP (ref 13–17)
MAGNESIUM SERPL-MCNC: 2.2 MG/DL — SIGNIFICANT CHANGE UP (ref 1.6–2.6)
MCHC RBC-ENTMCNC: 29.6 PG — SIGNIFICANT CHANGE UP (ref 27–34)
MCHC RBC-ENTMCNC: 32.2 G/DL — SIGNIFICANT CHANGE UP (ref 32–36)
MCV RBC AUTO: 91.8 FL — SIGNIFICANT CHANGE UP (ref 80–100)
NRBC # BLD: 0 /100 WBCS — SIGNIFICANT CHANGE UP (ref 0–0)
NRBC BLD-RTO: 0 /100 WBCS — SIGNIFICANT CHANGE UP (ref 0–0)
PHOSPHATE SERPL-MCNC: 3.3 MG/DL — SIGNIFICANT CHANGE UP (ref 2.5–4.5)
PLATELET # BLD AUTO: 290 K/UL — SIGNIFICANT CHANGE UP (ref 150–400)
POTASSIUM SERPL-MCNC: 4.8 MMOL/L — SIGNIFICANT CHANGE UP (ref 3.5–5.3)
POTASSIUM SERPL-SCNC: 4.8 MMOL/L — SIGNIFICANT CHANGE UP (ref 3.5–5.3)
RBC # BLD: 5.27 M/UL — SIGNIFICANT CHANGE UP (ref 4.2–5.8)
RBC # FLD: 14.1 % — SIGNIFICANT CHANGE UP (ref 10.3–14.5)
SODIUM SERPL-SCNC: 134 MMOL/L — LOW (ref 135–145)
WBC # BLD: 8.91 K/UL — SIGNIFICANT CHANGE UP (ref 3.8–10.5)
WBC # FLD AUTO: 8.91 K/UL — SIGNIFICANT CHANGE UP (ref 3.8–10.5)

## 2025-02-03 RX ORDER — INSULIN LISPRO 100/ML
6 VIAL (ML) SUBCUTANEOUS
Refills: 0 | Status: DISCONTINUED | OUTPATIENT
Start: 2025-02-03 | End: 2025-02-06

## 2025-02-03 RX ORDER — ASPIRIN 81 MG/1
81 TABLET, COATED ORAL DAILY
Refills: 0 | Status: DISCONTINUED | OUTPATIENT
Start: 2025-02-03 | End: 2025-02-07

## 2025-02-03 RX ORDER — ACETAMINOPHEN 160 MG/5ML
325 SUSPENSION ORAL ONCE
Refills: 0 | Status: COMPLETED | OUTPATIENT
Start: 2025-02-03 | End: 2025-02-03

## 2025-02-03 RX ORDER — ACETAMINOPHEN 160 MG/5ML
325 SUSPENSION ORAL EVERY 4 HOURS
Refills: 0 | Status: DISCONTINUED | OUTPATIENT
Start: 2025-02-03 | End: 2025-02-03

## 2025-02-03 RX ADMIN — Medication 40 MILLIGRAM(S): at 05:44

## 2025-02-03 RX ADMIN — ISOSORBIDE DINITRATE 10 MILLIGRAM(S): 40 TABLET ORAL at 05:45

## 2025-02-03 RX ADMIN — ACETAMINOPHEN 325 MILLIGRAM(S): 160 SUSPENSION ORAL at 19:32

## 2025-02-03 RX ADMIN — Medication 11 UNIT(S): at 12:09

## 2025-02-03 RX ADMIN — INSULIN GLARGINE-YFGN 5 UNIT(S): 100 INJECTION, SOLUTION SUBCUTANEOUS at 22:19

## 2025-02-03 RX ADMIN — Medication 75 MILLIGRAM(S): at 05:45

## 2025-02-03 RX ADMIN — ACETAMINOPHEN 325 MILLIGRAM(S): 160 SUSPENSION ORAL at 20:00

## 2025-02-03 RX ADMIN — Medication 6 UNIT(S): at 16:51

## 2025-02-03 RX ADMIN — Medication 1: at 08:21

## 2025-02-03 RX ADMIN — Medication 50 MILLIGRAM(S): at 05:44

## 2025-02-03 RX ADMIN — ISOSORBIDE DINITRATE 10 MILLIGRAM(S): 40 TABLET ORAL at 16:52

## 2025-02-03 RX ADMIN — Medication 40 MILLIGRAM(S): at 13:43

## 2025-02-03 RX ADMIN — Medication 3: at 16:51

## 2025-02-03 RX ADMIN — ISOSORBIDE DINITRATE 10 MILLIGRAM(S): 40 TABLET ORAL at 12:10

## 2025-02-03 RX ADMIN — Medication 800 UNIT(S)/HR: at 19:32

## 2025-02-03 RX ADMIN — ASPIRIN 81 MILLIGRAM(S): 81 TABLET, COATED ORAL at 12:11

## 2025-02-03 RX ADMIN — Medication 800 UNIT(S)/HR: at 02:05

## 2025-02-03 RX ADMIN — Medication 11 UNIT(S): at 08:21

## 2025-02-03 RX ADMIN — SODIUM BICARBONATE 650 MILLIGRAM(S): 42 INJECTION, SOLUTION INTRAVENOUS at 05:45

## 2025-02-03 NOTE — PROGRESS NOTE ADULT - SUBJECTIVE AND OBJECTIVE BOX
Date of service: 25 @ 15:18      Patient is a 69y old  Male who presents with a chief complaint of HF exacerbation (2025 11:49)                                                               INTERVAL HPI/OVERNIGHT EVENTS:    REVIEW OF SYSTEMS:    no cp   no N/V  sob on exerton                                                                                                                                                                                                                                                                            Medications:  MEDICATIONS  (STANDING):  acetaminophen     Tablet .. 325 milliGRAM(s) Oral once  aspirin  chewable 81 milliGRAM(s) Oral daily  dextrose 5%. 1000 milliLiter(s) (100 mL/Hr) IV Continuous <Continuous>  dextrose 5%. 1000 milliLiter(s) (50 mL/Hr) IV Continuous <Continuous>  dextrose 50% Injectable 25 Gram(s) IV Push once  dextrose 50% Injectable 12.5 Gram(s) IV Push once  dextrose 50% Injectable 25 Gram(s) IV Push once  furosemide    Tablet 40 milliGRAM(s) Oral two times a day  glucagon  Injectable 1 milliGRAM(s) IntraMuscular once  heparin  Infusion.  Unit(s)/Hr (12 mL/Hr) IV Continuous <Continuous>  insulin glargine Injectable (LANTUS) 5 Unit(s) SubCutaneous at bedtime  insulin lispro (ADMELOG) corrective regimen sliding scale   SubCutaneous three times a day before meals  insulin lispro (ADMELOG) corrective regimen sliding scale   SubCutaneous at bedtime  insulin lispro Injectable (ADMELOG) 6 Unit(s) SubCutaneous three times a day before meals  isosorbide   dinitrate Tablet (ISORDIL) 10 milliGRAM(s) Oral three times a day  metoprolol succinate ER 75 milliGRAM(s) Oral daily  spironolactone 50 milliGRAM(s) Oral daily    MEDICATIONS  (PRN):  dextrose Oral Gel 15 Gram(s) Oral once PRN Blood Glucose LESS THAN 70 milliGRAM(s)/deciliter  heparin   Injectable 5500 Unit(s) IV Push every 6 hours PRN For aPTT less than 40  heparin   Injectable 2500 Unit(s) IV Push every 6 hours PRN For aPTT between 40 - 57       Allergies    atorvastatin (Muscle Pain (Severe))    Intolerances      Vital Signs Last 24 Hrs  T(C): 36.7 (2025 11:49), Max: 36.7 (2025 11:49)  T(F): 98 (2025 11:49), Max: 98 (2025 11:49)  HR: 61 (2025 11:49) (61 - 78)  BP: 139/80 (2025 11:49) (139/80 - 150/77)  BP(mean): --  RR: 18 (2025 11:49) (18 - 18)  SpO2: 98% (2025 11:49) (97% - 99%)    Parameters below as of 2025 11:49  Patient On (Oxygen Delivery Method): room air      CAPILLARY BLOOD GLUCOSE      POCT Blood Glucose.: 129 mg/dL (2025 11:35)  POCT Blood Glucose.: 180 mg/dL (2025 08:15)  POCT Blood Glucose.: 146 mg/dL (2025 21:28)  POCT Blood Glucose.: 252 mg/dL (2025 16:45)       @ 07:01  -  -03 @ 07:00  --------------------------------------------------------  IN: 196 mL / OUT: 2800 mL / NET: -2604 mL     @ 07:01  -  0203 @ 15:18  --------------------------------------------------------  IN: 480 mL / OUT: 1400 mL / NET: -920 mL      Physical Exam:    Daily     Daily Weight in k.6 (2025 09:07)  General:  Well appearing, NAD, not cachetic  HEENT:  Nonicteric, PERRLA  CV:  RRR, S1S2   Lungs:  CTA B/L, no wheezes, rales, rhonchi  Abdomen:  Soft, non-tender, no distended, positive BS  Extremities: edema    Neuro:  AAOx3, non-focal, grossly intact                                                                                                                                                                                                                                                                                                LABS:                               15.6   8.91  )-----------( 290      ( 2025 06:47 )             48.4                      02-03    134[L]  |  98  |  50[H]  ----------------------------<  192[H]  4.8   |  23  |  2.18[H]    Ca    9.9      2025 06:47  Phos  3.3     02-  Mg     2.2     -                         RADIOLOGY & ADDITIONAL TESTS         I personally reviewed: [  ]EKG   [  ]CXR    [  ] CT      A/P:         Discussed with :     Derek consultants' Notes   Time spent :

## 2025-02-03 NOTE — PROGRESS NOTE ADULT - ASSESSMENT
70 y/o male with hx of CAD s/p multiple  stents, HFrEF (EF~50% as of 11/26/23), A-fib, HTN, HLD, T2DM, CKD3, former smoke discharged last week after being admitted for acute on chronic chf and influenza now admitted w worsening chf and near syncope       near syncope : ct neg  check orthostatics : neg     RSV : monitor O2   no wheezing on exam   supportive care        Acute on chronic HFrEF (heart failure with reduced ejection fraction).   cont diuresis : changed to po today        CAD (coronary artery disease).   cont cardiac meds   fu cards   unclear if his exertional sob is purely sec to RSV and recent influenza ..pt is not wheezing  will consult pul  and will discuss with cardiology       ·  Problem: Chronic atrial fibrillation.   hold xarelto   cont heparin for now       ·  Problem: History of peripheral vascular disease.   off trental   d/w vasc:  add aspirin     ·  Problem: Stage 3 chronic kidney disease.   ·  Plan: Creatinine at baseline  Dose meds for CKD3b  Avoid nephrotoxins.     Diabetes type 2.   monitor FS

## 2025-02-03 NOTE — CONSULT NOTE ADULT - PROBLEM SELECTOR RECOMMENDATION 9
Decreased basal insulin to 5 units  Decreased bolus insulin 6 units premeal  May be discharged on current basal/bolus insulin if glucose remain stable.   May use home insulin brands   Follow up outpatient Decreased basal insulin to 5 units  Decreased bolus insulin 6 units premeal  May be discharged on current basal/bolus insulin if glucose remain stable.   May use home insulin brands   [facility]   Follow up outpatient

## 2025-02-03 NOTE — CONSULT NOTE ADULT - SUBJECTIVE AND OBJECTIVE BOX
HPI:  69M w/ CAD s/p stents, HFrEF, AFib, HTN, T2DM, CKD3 presenting to the hospital with exertional dyspnea. Patient was recently admitted with foot cellulitis, arterial insufficiency, heart failure exacerbation and influenza. States he continued to have shortness of breath following hospital discharge and when he went to his cardiologist's office today, he couldn't walk more than a few steps without becoming winded and became borderline hypoxic, which prompted their office to refer him to the ED. He has had questionable adherence to his medications since discharge. Notes trouble walking around especially going up stairs. Has also had some cough and congestion since discharge. Denies chest pain, fever, chills, or other symptoms at present. Has no shortness of breath at rest. (2025 18:41)    Endo was consulted for glycemic control with hx of T2DM for hypoglycemia and hyperglycemia.         PAST MEDICAL & SURGICAL HISTORY:  Former smoker      CAD in native artery      Type 2 diabetes mellitus      Hyperlipidemia, unspecified hyperlipidemia type      Essential hypertension, hypertension with unspecified goal      Afib      Hematoma of leg      Stented coronary artery      CHF (congestive heart failure)      s/p Carotid Endarterectomy  left          FAMILY HISTORY:  Family history of heart disease  father  age 71    FH: atrial fibrillation  sister        Social History:            HOME MEDICATIONS:  Home Medications:  dapagliflozin 10 mg oral tablet: 1 tab(s) orally once a day (2025 17:45)  furosemide 40 mg oral tablet: 1 tab(s) orally once a day (2025 17:45)  insulin glargine 100 units/mL subcutaneous solution: 10 unit(s) subcutaneous once a day (at bedtime) (2025 17:45)  insulin lispro 100 units/mL injectable solution: 11 unit(s) subcutaneous 3 times a day (before meals) (2025 17:45)  rivaroxaban 15 mg oral tablet: 1 tab(s) orally once a day (before a meal) (2025 17:45)            MEDICATIONS  (STANDING):  acetaminophen     Tablet .. 325 milliGRAM(s) Oral once  aspirin  chewable 81 milliGRAM(s) Oral daily  dextrose 5%. 1000 milliLiter(s) (100 mL/Hr) IV Continuous <Continuous>  dextrose 5%. 1000 milliLiter(s) (50 mL/Hr) IV Continuous <Continuous>  dextrose 50% Injectable 25 Gram(s) IV Push once  dextrose 50% Injectable 12.5 Gram(s) IV Push once  dextrose 50% Injectable 25 Gram(s) IV Push once  furosemide    Tablet 40 milliGRAM(s) Oral two times a day  glucagon  Injectable 1 milliGRAM(s) IntraMuscular once  heparin  Infusion.  Unit(s)/Hr (12 mL/Hr) IV Continuous <Continuous>  insulin glargine Injectable (LANTUS) 5 Unit(s) SubCutaneous at bedtime  insulin lispro (ADMELOG) corrective regimen sliding scale   SubCutaneous three times a day before meals  insulin lispro (ADMELOG) corrective regimen sliding scale   SubCutaneous at bedtime  insulin lispro Injectable (ADMELOG) 6 Unit(s) SubCutaneous three times a day before meals  isosorbide   dinitrate Tablet (ISORDIL) 10 milliGRAM(s) Oral three times a day  metoprolol succinate ER 75 milliGRAM(s) Oral daily  spironolactone 50 milliGRAM(s) Oral daily    MEDICATIONS  (PRN):  dextrose Oral Gel 15 Gram(s) Oral once PRN Blood Glucose LESS THAN 70 milliGRAM(s)/deciliter  heparin   Injectable 5500 Unit(s) IV Push every 6 hours PRN For aPTT less than 40  heparin   Injectable 2500 Unit(s) IV Push every 6 hours PRN For aPTT between 40 - 57      Allergies    atorvastatin (Muscle Pain (Severe))    Intolerances        Review of Systems:  Neuro: No HA, no dizziness  Cardiovascular: No chest pain, no palpitations  Respiratory: no SOB, no cough  GI: No nausea, vomiting, abdominal pain  MSK: Denies joint/muscle pain      ALL OTHER SYSTEMS REVIEWED AND NEGATIVE    ***UNABLE TO OBTAIN    PHYSICAL EXAM:  VITALS: T(C): 36.7 (25 @ 11:49)  T(F): 98 (25 @ 11:49), Max: 98 (25 @ 11:49)  HR: 61 (25 @ 11:49) (61 - 78)  BP: 139/80 (25 @ 11:49) (139/80 - 150/77)  RR:  (18 - 18)  SpO2:  (97% - 99%)  Wt(kg): --  GENERAL: NAD, well-groomed, well-developed  NEURO:  alert and oriented  RESPIRATORY: Clear to auscultation bilaterally; No rales, rhonchi, wheezing  CARDIOVASCULAR: Si S2  GI: Soft, non distended, normal bowel sounds  MUSCULOSKELETAL: Moves all extremities equally       POCT Blood Glucose.: 129 mg/dL (25 @ 11:35)  POCT Blood Glucose.: 180 mg/dL (25 @ 08:15)  POCT Blood Glucose.: 146 mg/dL (25 @ 21:28)  POCT Blood Glucose.: 252 mg/dL (25 @ 16:45)  POCT Blood Glucose.: 114 mg/dL (25 @ 11:56)  POCT Blood Glucose.: 138 mg/dL (25 @ 07:58)  POCT Blood Glucose.: 131 mg/dL (25 @ 21:18)  POCT Blood Glucose.: 217 mg/dL (25 @ 16:35)  POCT Blood Glucose.: 121 mg/dL (25 @ 14:25)  POCT Blood Glucose.: 79 mg/dL (25 @ 13:24)  POCT Blood Glucose.: 63 mg/dL (25 @ 12:59)  POCT Blood Glucose.: 65 mg/dL (25 @ 12:34)  POCT Blood Glucose.: 63 mg/dL (25 @ 12:32)  POCT Blood Glucose.: 69 mg/dL (25 @ 11:48)  POCT Blood Glucose.: 63 mg/dL (25 @ 11:46)  POCT Blood Glucose.: 74 mg/dL (25 @ 07:51)  POCT Blood Glucose.: 168 mg/dL (25 @ 21:06)                            15.6   8.91  )-----------( 290      ( 2025 06:47 )             48.4       02-03    134[L]  |  98  |  50[H]  ----------------------------<  192[H]  4.8   |  23  |  2.18[H]    eGFR: 32[L]    Ca    9.9      02-03  Mg     2.2     02-03  Phos  3.3     02-03    TPro  5.7[L]  /  Alb  3.5  /  TBili  1.6[H]  /  DBili  x   /  AST  42[H]  /  ALT  31  /  AlkPhos  131[H]        Thyroid Function Tests:    Diet, Regular:   Consistent Carbohydrate No Snacks (CSTCHO)  DASH/TLC Sodium & Cholesterol Restricted (DASH)  1500mL Fluid Restriction ([id number])  Low Sodium (25 @ 19:09) [Active]          A1C with Estimated Average Glucose Result: 6.5 % (24 @ 06:21)  A1C with Estimated Average Glucose Result: 6.4 % (24 @ 06:44)  A1C with Estimated Average Glucose Result: 7.1 % (24 @ 06:20)  A1C with Estimated Average Glucose Result: 7.6 % (24 @ 06:46)  A1C with Estimated Average Glucose Result: 7.7 % (24 @ 03:52)                 HPI:  69M w/ CAD s/p stents, HFrEF, AFib, HTN, T2DM, CKD3 presenting to the hospital with exertional dyspnea. Patient was recently admitted with foot cellulitis, arterial insufficiency, heart failure exacerbation and influenza. States he continued to have shortness of breath following hospital discharge and when he went to his cardiologist's office today, he couldn't walk more than a few steps without becoming winded and became borderline hypoxic, which prompted their office to refer him to the ED. He has had questionable adherence to his medications since discharge. Notes trouble walking around especially going up stairs. Has also had some cough and congestion since discharge. Denies chest pain, fever, chills, or other symptoms at present. Has no shortness of breath at rest. (2025 18:41)    Endo was consulted for glycemic control with hx of T2DM for hypoglycemia and hyperglycemia.         PAST MEDICAL & SURGICAL HISTORY:  Former smoker      CAD in native artery      Type 2 diabetes mellitus      Hyperlipidemia, unspecified hyperlipidemia type      Essential hypertension, hypertension with unspecified goal      Afib      Hematoma of leg      Stented coronary artery      CHF (congestive heart failure)      s/p Carotid Endarterectomy  left          FAMILY HISTORY:  Family history of heart disease  father  age 71    FH: atrial fibrillation  sister        Social History:            HOME MEDICATIONS:  Home Medications:  dapagliflozin 10 mg oral tablet: 1 tab(s) orally once a day (2025 17:45)  furosemide 40 mg oral tablet: 1 tab(s) orally once a day (2025 17:45)  insulin glargine 100 units/mL subcutaneous solution: 10 unit(s) subcutaneous once a day (at bedtime) (2025 17:45)  insulin lispro 100 units/mL injectable solution: 11 unit(s) subcutaneous 3 times a day (before meals) (2025 17:45)  rivaroxaban 15 mg oral tablet: 1 tab(s) orally once a day (before a meal) (2025 17:45)            MEDICATIONS  (STANDING):  acetaminophen     Tablet .. 325 milliGRAM(s) Oral once  aspirin  chewable 81 milliGRAM(s) Oral daily  dextrose 5%. 1000 milliLiter(s) (100 mL/Hr) IV Continuous <Continuous>  dextrose 5%. 1000 milliLiter(s) (50 mL/Hr) IV Continuous <Continuous>  dextrose 50% Injectable 25 Gram(s) IV Push once  dextrose 50% Injectable 12.5 Gram(s) IV Push once  dextrose 50% Injectable 25 Gram(s) IV Push once  furosemide    Tablet 40 milliGRAM(s) Oral two times a day  glucagon  Injectable 1 milliGRAM(s) IntraMuscular once  heparin  Infusion.  Unit(s)/Hr (12 mL/Hr) IV Continuous <Continuous>  insulin glargine Injectable (LANTUS) 5 Unit(s) SubCutaneous at bedtime  insulin lispro (ADMELOG) corrective regimen sliding scale   SubCutaneous three times a day before meals  insulin lispro (ADMELOG) corrective regimen sliding scale   SubCutaneous at bedtime  insulin lispro Injectable (ADMELOG) 6 Unit(s) SubCutaneous three times a day before meals  isosorbide   dinitrate Tablet (ISORDIL) 10 milliGRAM(s) Oral three times a day  metoprolol succinate ER 75 milliGRAM(s) Oral daily  spironolactone 50 milliGRAM(s) Oral daily    MEDICATIONS  (PRN):  dextrose Oral Gel 15 Gram(s) Oral once PRN Blood Glucose LESS THAN 70 milliGRAM(s)/deciliter  heparin   Injectable 5500 Unit(s) IV Push every 6 hours PRN For aPTT less than 40  heparin   Injectable 2500 Unit(s) IV Push every 6 hours PRN For aPTT between 40 - 57      Allergies    atorvastatin (Muscle Pain (Severe))    Intolerances        Review of Systems:  Neuro: No HA, no dizziness  Cardiovascular: No chest pain, no palpitations  Respiratory: no SOB, no cough  GI: No nausea, vomiting, abdominal pain  MSK: Denies joint/muscle pain      ALL OTHER SYSTEMS REVIEWED AND NEGATIVE    ***UNABLE TO OBTAIN    PHYSICAL EXAM:  VITALS: T(C): 36.7 (25 @ 11:49)  T(F): 98 (25 @ 11:49), Max: 98 (25 @ 11:49)  HR: 61 (25 @ 11:49) (61 - 78)  BP: 139/80 (25 @ 11:49) (139/80 - 150/77)  RR:  (18 - 18)  SpO2:  (97% - 99%)  Wt(kg): --  GENERAL: NAD, well-groomed, well-developed  NEURO:  alert and oriented  RESPIRATORY: Clear to auscultation bilaterally; No rales, rhonchi, wheezing  CARDIOVASCULAR: Si S2  GI: Soft, non distended, normal bowel sounds  MUSCULOSKELETAL: Moves all extremities equally       POCT Blood Glucose.: 129 mg/dL (25 @ 11:35)  POCT Blood Glucose.: 180 mg/dL (25 @ 08:15)  POCT Blood Glucose.: 146 mg/dL (25 @ 21:28)  POCT Blood Glucose.: 252 mg/dL (25 @ 16:45)  POCT Blood Glucose.: 114 mg/dL (25 @ 11:56)  POCT Blood Glucose.: 138 mg/dL (25 @ 07:58)  POCT Blood Glucose.: 131 mg/dL (25 @ 21:18)  POCT Blood Glucose.: 217 mg/dL (25 @ 16:35)  POCT Blood Glucose.: 121 mg/dL (25 @ 14:25)  POCT Blood Glucose.: 79 mg/dL (25 @ 13:24)  POCT Blood Glucose.: 63 mg/dL (25 @ 12:59)  POCT Blood Glucose.: 65 mg/dL (25 @ 12:34)  POCT Blood Glucose.: 63 mg/dL (25 @ 12:32)  POCT Blood Glucose.: 69 mg/dL (25 @ 11:48)  POCT Blood Glucose.: 63 mg/dL (25 @ 11:46)  POCT Blood Glucose.: 74 mg/dL (25 @ 07:51)  POCT Blood Glucose.: 168 mg/dL (25 @ 21:06)                            15.6   8.91  )-----------( 290      ( 2025 06:47 )             48.4       02-03    134[L]  |  98  |  50[H]  ----------------------------<  192[H]  4.8   |  23  |  2.18[H]    eGFR: 32[L]    Ca    9.9      02-03  Mg     2.2     02-03  Phos  3.3     02-03    TPro  5.7[L]  /  Alb  3.5  /  TBili  1.6[H]  /  DBili  x   /  AST  42[H]  /  ALT  31  /  AlkPhos  131[H]        Thyroid Function Tests:    Diet, Regular:   Consistent Carbohydrate No Snacks (CSTCHO)  DASH/TLC Sodium & Cholesterol Restricted (DASH)  1500mL Fluid Restriction ([id number])  Low Sodium (25 @ 19:09) [Active]          A1C with Estimated Average Glucose Result: 6.5 % (24 @ 06:21)  A1C with Estimated Average Glucose Result: 6.4 % (24 @ 06:44)  A1C with Estimated Average Glucose Result: 7.1 % (24 @ 06:20)  A1C with Estimated Average Glucose Result: 7.6 % (24 @ 06:46)  A1C with Estimated Average Glucose Result: 7.7 % (24 @ 03:52)

## 2025-02-03 NOTE — CONSULT NOTE ADULT - SUBJECTIVE AND OBJECTIVE BOX
Patient is a 69y old  Male who presents with a chief complaint of HF exacerbation (03 Feb 2025 15:18)      HPI:  69M w/ CAD s/p stents, HFrEF, AFib, HTN, T2DM, CKD3 presenting to the hospital with exertional dyspnea. Patient was recently admitted with foot cellulitis, arterial insufficiency, heart failure exacerbation and influenza. States he continued to have shortness of breath following hospital discharge and when he went to his cardiologist's office today, he couldn't walk more than a few steps without becoming winded and became borderline hypoxic, which prompted their office to refer him to the ED. He has had questionable adherence to his medications since discharge. Notes trouble walking around especially going up stairs. Has also had some cough and congestion since discharge. Denies chest pain, fever, chills, or other symptoms at present. Has no shortness of breath at rest. (31 Jan 2025 18:41)      PAST MEDICAL & SURGICAL HISTORY:  Former smoker      CAD in native artery      Type 2 diabetes mellitus      Hyperlipidemia, unspecified hyperlipidemia type      Essential hypertension, hypertension with unspecified goal      Afib      Hematoma of leg      Stented coronary artery      CHF (congestive heart failure)      s/p Carotid Endarterectomy  left          MEDICATIONS  (STANDING):  acetaminophen     Tablet .. 325 milliGRAM(s) Oral once  aspirin  chewable 81 milliGRAM(s) Oral daily  dextrose 5%. 1000 milliLiter(s) (100 mL/Hr) IV Continuous <Continuous>  dextrose 5%. 1000 milliLiter(s) (50 mL/Hr) IV Continuous <Continuous>  dextrose 50% Injectable 25 Gram(s) IV Push once  dextrose 50% Injectable 12.5 Gram(s) IV Push once  dextrose 50% Injectable 25 Gram(s) IV Push once  furosemide    Tablet 40 milliGRAM(s) Oral two times a day  glucagon  Injectable 1 milliGRAM(s) IntraMuscular once  heparin  Infusion.  Unit(s)/Hr (12 mL/Hr) IV Continuous <Continuous>  insulin glargine Injectable (LANTUS) 5 Unit(s) SubCutaneous at bedtime  insulin lispro (ADMELOG) corrective regimen sliding scale   SubCutaneous three times a day before meals  insulin lispro (ADMELOG) corrective regimen sliding scale   SubCutaneous at bedtime  insulin lispro Injectable (ADMELOG) 6 Unit(s) SubCutaneous three times a day before meals  isosorbide   dinitrate Tablet (ISORDIL) 10 milliGRAM(s) Oral three times a day  metoprolol succinate ER 75 milliGRAM(s) Oral daily  spironolactone 50 milliGRAM(s) Oral daily    MEDICATIONS  (PRN):  dextrose Oral Gel 15 Gram(s) Oral once PRN Blood Glucose LESS THAN 70 milliGRAM(s)/deciliter  heparin   Injectable 5500 Unit(s) IV Push every 6 hours PRN For aPTT less than 40  heparin   Injectable 2500 Unit(s) IV Push every 6 hours PRN For aPTT between 40 - 57      Allergies    atorvastatin (Muscle Pain (Severe))    Intolerances        VITALS:    Vital Signs Last 24 Hrs  T(C): 36.7 (03 Feb 2025 11:49), Max: 36.7 (03 Feb 2025 11:49)  T(F): 98 (03 Feb 2025 11:49), Max: 98 (03 Feb 2025 11:49)  HR: 61 (03 Feb 2025 11:49) (61 - 78)  BP: 139/80 (03 Feb 2025 11:49) (139/80 - 150/77)  BP(mean): --  RR: 18 (03 Feb 2025 11:49) (18 - 18)  SpO2: 98% (03 Feb 2025 11:49) (97% - 99%)    Parameters below as of 03 Feb 2025 11:49  Patient On (Oxygen Delivery Method): room air        LABS:                          15.6   8.91  )-----------( 290      ( 03 Feb 2025 06:47 )             48.4       02-03    134[L]  |  98  |  50[H]  ----------------------------<  192[H]  4.8   |  23  |  2.18[H]    Ca    9.9      03 Feb 2025 06:47  Phos  3.3     02-03  Mg     2.2     02-03        CAPILLARY BLOOD GLUCOSE      POCT Blood Glucose.: 255 mg/dL (03 Feb 2025 16:33)  POCT Blood Glucose.: 129 mg/dL (03 Feb 2025 11:35)  POCT Blood Glucose.: 180 mg/dL (03 Feb 2025 08:15)  POCT Blood Glucose.: 146 mg/dL (02 Feb 2025 21:28)      PTT - ( 03 Feb 2025 00:58 )  PTT:91.7 sec    LOWER EXTREMITY PHYSICAL EXAM:    Vascular: DP/PT 0/4, B/L, CFT <4 seconds B/L, Temperature gradient cool, B/L.   Neuro: Epicritic sensation diminished to the level of feet, B/L.  Musculoskeletal/Ortho: Dorsal wound noted left foot with edema, erythema localized to left foot  Non-healing wound in the presence of PVD left foot  No heel ulcerations noted

## 2025-02-03 NOTE — PROGRESS NOTE ADULT - SUBJECTIVE AND OBJECTIVE BOX
Overnight events noted      VITAL:  T(C): , Max: 36.5 (02-03-25 @ 05:54)  T(F): , Max: 97.7 (02-03-25 @ 05:54)  HR: 73 (02-03-25 @ 05:54)  BP: 147/76 (02-03-25 @ 05:54)  BP(mean): --  RR: 18 (02-03-25 @ 05:54)  SpO2: 99% (02-03-25 @ 05:54)  Urine output 2800cc/24h      PHYSICAL EXAM:  Constitutional: NAD, Alert  HEENT: NCAT, MMM  Neck: Supple, No JVD  Respiratory: CTA-b/l  Cardiovascular: reg s1s2  Gastrointestinal: BS+, soft, NT/ND  Extremities: No peripheral edema b/l  Neurological: no focal deficits; strength grossly intact  Back: no CVAT b/l  Skin: No rashes, no nevi    LABS:                        15.6   8.91  )-----------( 290      ( 03 Feb 2025 06:47 )             48.4     Na(134)/K(4.8)/Cl(98)/HCO3(23)/BUN(50)/Cr(2.18)Glu(192)/Ca(9.9)/Mg(2.2)/PO4(3.3)    02-03 @ 06:47  Na(135)/K(3.5)/Cl(100)/HCO3(20)/BUN(46)/Cr(2.09)Glu(116)/Ca(9.0)/Mg(--)/PO4(3.1)    02-02 @ 06:00  Na(139)/K(3.9)/Cl(104)/HCO3(20)/BUN(41)/Cr(1.86)Glu(73)/Ca(9.0)/Mg(2.1)/PO4(2.9)    02-01 @ 06:20  Na(135)/K(4.9)/Cl(99)/HCO3(20)/BUN(44)/Cr(1.95)Glu(234)/Ca(9.6)/Mg(--)/PO4(--)    01-31 @ 16:14      IMPRESSION: 69M w/ HTN, DM2, AFib, CAD-10 stents, HFrEF, PAD, and CKD, 1/31/25 p/w SOB in association with RSV    (1)Renal - 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 Numbers fluctuating based on hemodynamic status    (2)Lytes - acceptable    (3)CV - CHF exacerbation on admission - resolving with IV Lasix + PO Spironolactone    (4)ID - RSV      RECOMMEND:  (1)Can advance to PO Lasix - 40mg po bid  (2)Spironolactone as ordered  (3)Can attempt d/c of PO NaHCO3 at this point          Markell Walton MD  [facility]  Office/on call physician: [phone]  Cell (7a-7p): [phone]       No pain; no SOB at rest; (+)intermittent CLEARY per patient      VITAL:  T(C): , Max: 36.5 (02-03-25 @ 05:54)  T(F): , Max: 97.7 (02-03-25 @ 05:54)  HR: 73 (02-03-25 @ 05:54)  BP: 147/76 (02-03-25 @ 05:54)  BP(mean): --  RR: 18 (02-03-25 @ 05:54)  SpO2: 99% (02-03-25 @ 05:54)  Urine output 2800cc/24h      PHYSICAL EXAM:  Constitutional: NAD, Alert  HEENT: NCAT, DMM  Neck: Supple, No JVD  Respiratory: CTA-b/l  Cardiovascular: reg s1s2  Gastrointestinal: BS+, soft, NT/ND  Extremities: No peripheral edema b/l  Neurological: no focal deficits; strength grossly intact  Back: no CVAT b/l  Skin: No rashes, no nevi    LABS:                        15.6   8.91  )-----------( 290      ( 03 Feb 2025 06:47 )             48.4     Na(134)/K(4.8)/Cl(98)/HCO3(23)/BUN(50)/Cr(2.18)Glu(192)/Ca(9.9)/Mg(2.2)/PO4(3.3)    02-03 @ 06:47  Na(135)/K(3.5)/Cl(100)/HCO3(20)/BUN(46)/Cr(2.09)Glu(116)/Ca(9.0)/Mg(--)/PO4(3.1)    02-02 @ 06:00  Na(139)/K(3.9)/Cl(104)/HCO3(20)/BUN(41)/Cr(1.86)Glu(73)/Ca(9.0)/Mg(2.1)/PO4(2.9)    02-01 @ 06:20  Na(135)/K(4.9)/Cl(99)/HCO3(20)/BUN(44)/Cr(1.95)Glu(234)/Ca(9.6)/Mg(--)/PO4(--)    01-31 @ 16:14      IMPRESSION: 69M w/ HTN, DM2, AFib, CAD-10 stents, HFrEF, PAD, and CKD, 1/31/25 p/w SOB in association with RSV    (1)Renal - 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 Numbers fluctuating based on hemodynamic status    (2)Lytes - acceptable - he may not require standing PO NaHCO3    (3)CV - acceptable volume at present - we can advance to PO diuretic regimen     (4)ID - RSV      RECOMMEND:  (1)Can advance to PO Lasix - 40mg po bid  (2)Spironolactone as ordered  (3)Can attempt d/c of PO NaHCO3 at this point          Markell Walton MD  [facility]  Office/on call physician: [phone]  Cell (7a-7p): [phone]

## 2025-02-03 NOTE — CONSULT NOTE ADULT - ASSESSMENT
Assessment  DMT2: 69y Male with DM T2 with hyperglycemia, A1C 6.5, was on oral dapagliflozin and basal lantus 10 &  lispro 11 at home, blood sugars fluctuating and had hypoglycemia episode .    CAD: on medications, stable, monitored.  HTN: on antihypertensive medications, monitored, asymptomatic.  CKD: Monitor labs/BMP,       Discussed plan and management wit Dr Flo Rossi MD  Cell: [phone]  Office: [phone]                 Assessment  DMT2: 69y Male with DM T2 with hyperglycemia, A1C 6.5, was on oral dapagliflozin and basal lantus 10 &  lispro 11 at home, blood sugars fluctuating and had hypoglycemia episode .    CAD: on medications, stable, monitored.  HTN: on antihypertensive medications, monitored, asymptomatic.  CKD: Monitor labs/BMP,       Discussed plan and management wit Dr Flo Rossi MD  Cell: [phone]  Office: [phone]

## 2025-02-03 NOTE — CONSULT NOTE ADULT - ASSESSMENT
Patient visited at bedside with RSV  LOWER EXTREMITY PHYSICAL EXAM:    Vascular: DP/PT 0/4, B/L, CFT <4 seconds B/L, Temperature gradient cool, B/L.   Neuro: Epicritic sensation diminished to the level of feet, B/L.  Musculoskeletal/Ortho: Dorsal wound noted left foot with edema, erythema localized to left foot  Non-healing wound in the presence of PVD left foot  No heel ulcerations noted  no signs of ascending cellulitis left foot  Appreciate vascular input  recommend wound care betadine with gauze and bill left foot but defer to vascular  recommend ID input  no podiatric surgical intervention needed at this time  reconsult podiatry as needed     .

## 2025-02-03 NOTE — PROGRESS NOTE ADULT - ASSESSMENT
Imp:  CHF with non operable and non intervenable CAD with EF 40-45%   CHF is somewhat improved with IV Lasix   Renal function is stable  Would not proceed coronary angiography at this time as the patient has acute RSV and the yield is quite low  Off trental.  The patient is stable from a cardiovascular perspective.

## 2025-02-03 NOTE — PROGRESS NOTE ADULT - SUBJECTIVE AND OBJECTIVE BOX
Subjective: Patient reports breathing has improved  Edema also somewhat improved    MEDICATIONS  (STANDING):  acetaminophen     Tablet .. 325 milliGRAM(s) Oral once  aspirin  chewable 81 milliGRAM(s) Oral daily  dextrose 5%. 1000 milliLiter(s) (100 mL/Hr) IV Continuous <Continuous>  dextrose 5%. 1000 milliLiter(s) (50 mL/Hr) IV Continuous <Continuous>  dextrose 50% Injectable 25 Gram(s) IV Push once  dextrose 50% Injectable 12.5 Gram(s) IV Push once  dextrose 50% Injectable 25 Gram(s) IV Push once  furosemide    Tablet 40 milliGRAM(s) Oral two times a day  glucagon  Injectable 1 milliGRAM(s) IntraMuscular once  heparin  Infusion.  Unit(s)/Hr (12 mL/Hr) IV Continuous <Continuous>  insulin glargine Injectable (LANTUS) 5 Unit(s) SubCutaneous at bedtime  insulin lispro (ADMELOG) corrective regimen sliding scale   SubCutaneous three times a day before meals  insulin lispro (ADMELOG) corrective regimen sliding scale   SubCutaneous at bedtime  insulin lispro Injectable (ADMELOG) 11 Unit(s) SubCutaneous three times a day before meals  isosorbide   dinitrate Tablet (ISORDIL) 10 milliGRAM(s) Oral three times a day  metoprolol succinate ER 75 milliGRAM(s) Oral daily  spironolactone 50 milliGRAM(s) Oral daily    MEDICATIONS  (PRN):  dextrose Oral Gel 15 Gram(s) Oral once PRN Blood Glucose LESS THAN 70 milliGRAM(s)/deciliter  heparin   Injectable 5500 Unit(s) IV Push every 6 hours PRN For aPTT less than 40  heparin   Injectable 2500 Unit(s) IV Push every 6 hours PRN For aPTT between 40 - 57    Physical Exam:   T(F): 98 (02-03-25 @ 11:49), Max: 98 (02-03-25 @ 11:49)  HR: 61 (02-03-25 @ 11:49) (61 - 78)  BP: 139/80 (02-03-25 @ 11:49) (123/77 - 150/77)  RR: 18 (02-03-25 @ 11:49) (18 - 18)  SpO2: 98% (02-03-25 @ 11:49) (97% - 99%)  Wt(kg): --  ,   I&O's Summary    02 Feb 2025 07:01  -  03 Feb 2025 07:00  --------------------------------------------------------  IN: 196 mL / OUT: 2800 mL / NET: -2604 mL    03 Feb 2025 07:01  -  03 Feb 2025 11:50  --------------------------------------------------------  IN: 0 mL / OUT: 600 mL / NET: -600 mL        Bilateral crackles (improved)  Irregular rhythm 2/6 systolic murmur  1+ edema         Labs:                       15.6   8.91  )-----------( 290      ( 03 Feb 2025 06:47 )             48.4               02-03    134[L]  |  98  |  50[H]  ----------------------------<  192[H]  4.8   |  23  |  2.18[H]    Ca    9.9      03 Feb 2025 06:47  Phos  3.3     02-03  Mg     2.2     02-03      PTT - ( 03 Feb 2025 00:58 )  PTT:91.7 sec                   Urinalysis Basic - ( 03 Feb 2025 06:47 )    Color: x / Appearance: x / SG: x / pH: x  Gluc: 192 mg/dL / Ketone: x  / Bili: x / Urobili: x   Blood: x / Protein: x / Nitrite: x   Leuk Esterase: x / RBC: x / WBC x   Sq Epi: x / Non Sq Epi: x / Bacteria: x

## 2025-02-04 DIAGNOSIS — I50.9 HEART FAILURE, UNSPECIFIED: ICD-10-CM

## 2025-02-04 DIAGNOSIS — B33.8 OTHER SPECIFIED VIRAL DISEASES: ICD-10-CM

## 2025-02-04 DIAGNOSIS — R06.02 SHORTNESS OF BREATH: ICD-10-CM

## 2025-02-04 LAB
ANION GAP SERPL CALC-SCNC: 15 MMOL/L — SIGNIFICANT CHANGE UP (ref 5–17)
APTT BLD: 96.8 SEC — HIGH (ref 24.5–35.6)
BUN SERPL-MCNC: 52 MG/DL — HIGH (ref 7–23)
CALCIUM SERPL-MCNC: 9.6 MG/DL — SIGNIFICANT CHANGE UP (ref 8.4–10.5)
CHLORIDE SERPL-SCNC: 100 MMOL/L — SIGNIFICANT CHANGE UP (ref 96–108)
CO2 SERPL-SCNC: 18 MMOL/L — LOW (ref 22–31)
CREAT SERPL-MCNC: 1.98 MG/DL — HIGH (ref 0.5–1.3)
EGFR: 36 ML/MIN/1.73M2 — LOW
GLUCOSE BLDC GLUCOMTR-MCNC: 157 MG/DL — HIGH (ref 70–99)
GLUCOSE BLDC GLUCOMTR-MCNC: 163 MG/DL — HIGH (ref 70–99)
GLUCOSE BLDC GLUCOMTR-MCNC: 176 MG/DL — HIGH (ref 70–99)
GLUCOSE BLDC GLUCOMTR-MCNC: 193 MG/DL — HIGH (ref 70–99)
GLUCOSE SERPL-MCNC: 184 MG/DL — HIGH (ref 70–99)
HCT VFR BLD CALC: 44.4 % — SIGNIFICANT CHANGE UP (ref 39–50)
HGB BLD-MCNC: 14.6 G/DL — SIGNIFICANT CHANGE UP (ref 13–17)
MCHC RBC-ENTMCNC: 30 PG — SIGNIFICANT CHANGE UP (ref 27–34)
MCHC RBC-ENTMCNC: 32.9 G/DL — SIGNIFICANT CHANGE UP (ref 32–36)
MCV RBC AUTO: 91.2 FL — SIGNIFICANT CHANGE UP (ref 80–100)
NRBC # BLD: 0 /100 WBCS — SIGNIFICANT CHANGE UP (ref 0–0)
NRBC BLD-RTO: 0 /100 WBCS — SIGNIFICANT CHANGE UP (ref 0–0)
PLATELET # BLD AUTO: 276 K/UL — SIGNIFICANT CHANGE UP (ref 150–400)
POTASSIUM SERPL-MCNC: 4.4 MMOL/L — SIGNIFICANT CHANGE UP (ref 3.5–5.3)
POTASSIUM SERPL-SCNC: 4.4 MMOL/L — SIGNIFICANT CHANGE UP (ref 3.5–5.3)
RBC # BLD: 4.87 M/UL — SIGNIFICANT CHANGE UP (ref 4.2–5.8)
RBC # FLD: 13.8 % — SIGNIFICANT CHANGE UP (ref 10.3–14.5)
SODIUM SERPL-SCNC: 133 MMOL/L — LOW (ref 135–145)
WBC # BLD: 9 K/UL — SIGNIFICANT CHANGE UP (ref 3.8–10.5)
WBC # FLD AUTO: 9 K/UL — SIGNIFICANT CHANGE UP (ref 3.8–10.5)

## 2025-02-04 PROCEDURE — G0545: CPT

## 2025-02-04 PROCEDURE — 93454 CORONARY ARTERY ANGIO S&I: CPT | Mod: 26

## 2025-02-04 PROCEDURE — 99222 1ST HOSP IP/OBS MODERATE 55: CPT

## 2025-02-04 PROCEDURE — 99152 MOD SED SAME PHYS/QHP 5/>YRS: CPT

## 2025-02-04 RX ORDER — SODIUM BICARBONATE 42 MG/ML
650 INJECTION, SOLUTION INTRAVENOUS
Refills: 0 | Status: DISCONTINUED | OUTPATIENT
Start: 2025-02-04 | End: 2025-02-07

## 2025-02-04 RX ORDER — INSULIN GLARGINE-YFGN 100 [IU]/ML
8 INJECTION, SOLUTION SUBCUTANEOUS AT BEDTIME
Refills: 0 | Status: DISCONTINUED | OUTPATIENT
Start: 2025-02-04 | End: 2025-02-05

## 2025-02-04 RX ORDER — HEPARIN SODIUM,PORCINE 10000/ML
800 VIAL (ML) INJECTION
Qty: 25000 | Refills: 0 | Status: DISCONTINUED | OUTPATIENT
Start: 2025-02-04 | End: 2025-02-05

## 2025-02-04 RX ADMIN — INSULIN GLARGINE-YFGN 8 UNIT(S): 100 INJECTION, SOLUTION SUBCUTANEOUS at 22:41

## 2025-02-04 RX ADMIN — Medication 1: at 08:31

## 2025-02-04 RX ADMIN — Medication 75 MILLIGRAM(S): at 05:26

## 2025-02-04 RX ADMIN — ISOSORBIDE DINITRATE 10 MILLIGRAM(S): 40 TABLET ORAL at 12:47

## 2025-02-04 RX ADMIN — Medication 800 UNIT(S)/HR: at 22:30

## 2025-02-04 RX ADMIN — Medication 40 MILLIGRAM(S): at 12:58

## 2025-02-04 RX ADMIN — Medication 1: at 17:44

## 2025-02-04 RX ADMIN — Medication 1: at 12:48

## 2025-02-04 RX ADMIN — Medication 6 UNIT(S): at 12:48

## 2025-02-04 RX ADMIN — SODIUM BICARBONATE 650 MILLIGRAM(S): 42 INJECTION, SOLUTION INTRAVENOUS at 17:31

## 2025-02-04 RX ADMIN — Medication 6 UNIT(S): at 08:31

## 2025-02-04 RX ADMIN — Medication 800 UNIT(S)/HR: at 08:31

## 2025-02-04 RX ADMIN — Medication 40 MILLIGRAM(S): at 05:12

## 2025-02-04 RX ADMIN — ISOSORBIDE DINITRATE 10 MILLIGRAM(S): 40 TABLET ORAL at 16:19

## 2025-02-04 RX ADMIN — ISOSORBIDE DINITRATE 10 MILLIGRAM(S): 40 TABLET ORAL at 05:12

## 2025-02-04 RX ADMIN — Medication 6 UNIT(S): at 17:44

## 2025-02-04 RX ADMIN — ASPIRIN 81 MILLIGRAM(S): 81 TABLET, COATED ORAL at 12:48

## 2025-02-04 RX ADMIN — Medication 50 MILLIGRAM(S): at 05:12

## 2025-02-04 NOTE — CONSULT NOTE ADULT - ASSESSMENT
70 y/o M with PMH of CAD s/p stents, HFrEF, AFib, HTN, T2DM, CKD3 presenting to the hospital with exertional dyspnea. Patient was recently admitted with foot cellulitis, arterial insufficiency, CHF exacerbation and influenza. States he continued to have shortness of breath following hospital discharge, reportedly borderline hypoxic while at cardiologist's office who referred him back to the ED. RVP on admission remains + for Influenza A, also now + for RSV. CXR 1/31 grossly clear. Pulmonary called to consult for SOB, RSV infection.

## 2025-02-04 NOTE — PROGRESS NOTE ADULT - SUBJECTIVE AND OBJECTIVE BOX
Chief complaint  Patient is a 69y old  Male who presents with a chief complaint of HF exacerbation (04 Feb 2025 09:03)         Labs and Fingersticks  CAPILLARY BLOOD GLUCOSE      POCT Blood Glucose.: 163 mg/dL (04 Feb 2025 12:11)  POCT Blood Glucose.: 193 mg/dL (04 Feb 2025 08:18)  POCT Blood Glucose.: 182 mg/dL (03 Feb 2025 22:13)  POCT Blood Glucose.: 255 mg/dL (03 Feb 2025 16:33)      Anion Gap: 15 (02-04 @ 06:09)  Anion Gap: 13 (02-03 @ 06:47)      Calcium: 9.6 (02-04 @ 06:09)  Calcium: 9.9 (02-03 @ 06:47)          02-04    133[L]  |  100  |  52[H]  ----------------------------<  184[H]  4.4   |  18[L]  |  1.98[H]    Ca    9.6      04 Feb 2025 06:09  Phos  3.3     02-03  Mg     2.2     02-03                          14.6   9.00  )-----------( 276      ( 04 Feb 2025 06:09 )             44.4     Medications  MEDICATIONS  (STANDING):  aspirin  chewable 81 milliGRAM(s) Oral daily  dextrose 5%. 1000 milliLiter(s) (100 mL/Hr) IV Continuous <Continuous>  dextrose 5%. 1000 milliLiter(s) (50 mL/Hr) IV Continuous <Continuous>  dextrose 50% Injectable 25 Gram(s) IV Push once  dextrose 50% Injectable 12.5 Gram(s) IV Push once  dextrose 50% Injectable 25 Gram(s) IV Push once  furosemide    Tablet 40 milliGRAM(s) Oral two times a day  glucagon  Injectable 1 milliGRAM(s) IntraMuscular once  heparin  Infusion.  Unit(s)/Hr (12 mL/Hr) IV Continuous <Continuous>  insulin glargine Injectable (LANTUS) 5 Unit(s) SubCutaneous at bedtime  insulin lispro (ADMELOG) corrective regimen sliding scale   SubCutaneous three times a day before meals  insulin lispro (ADMELOG) corrective regimen sliding scale   SubCutaneous at bedtime  insulin lispro Injectable (ADMELOG) 6 Unit(s) SubCutaneous three times a day before meals  isosorbide   dinitrate Tablet (ISORDIL) 10 milliGRAM(s) Oral three times a day  metoprolol succinate ER 75 milliGRAM(s) Oral daily  sodium bicarbonate 650 milliGRAM(s) Oral two times a day  spironolactone 50 milliGRAM(s) Oral daily      Physical Exam  General: Patient comfortable in bed   Vital Signs Last 12 Hrs  T(F): 97.5 (02-04-25 @ 12:31), Max: 97.8 (02-04-25 @ 04:47)  HR: 74 (02-04-25 @ 12:31) (72 - 74)  BP: 150/81 (02-04-25 @ 12:31) (142/93 - 150/81)  BP(mean): --  RR: 18 (02-04-25 @ 12:31) (18 - 18)  SpO2: 99% (02-04-25 @ 12:31) (97% - 99%)    CVS: S1S2   Respiratory: No wheezing, no crepitations  GI: Abdomen soft, bowel sounds positive  Musculoskeletal:  moves all extremities         Chief complaint  Patient is a 69y old  Male who presents with a chief complaint of HF exacerbation (04 Feb 2025 09:03)     Labs and Fingersticks  CAPILLARY BLOOD GLUCOSE      POCT Blood Glucose.: 163 mg/dL (04 Feb 2025 12:11)  POCT Blood Glucose.: 193 mg/dL (04 Feb 2025 08:18)  POCT Blood Glucose.: 182 mg/dL (03 Feb 2025 22:13)  POCT Blood Glucose.: 255 mg/dL (03 Feb 2025 16:33)      Anion Gap: 15 (02-04 @ 06:09)  Anion Gap: 13 (02-03 @ 06:47)      Calcium: 9.6 (02-04 @ 06:09)  Calcium: 9.9 (02-03 @ 06:47)          02-04    133[L]  |  100  |  52[H]  ----------------------------<  184[H]  4.4   |  18[L]  |  1.98[H]    Ca    9.6      04 Feb 2025 06:09  Phos  3.3     02-03  Mg     2.2     02-03                          14.6   9.00  )-----------( 276      ( 04 Feb 2025 06:09 )             44.4     Medications  MEDICATIONS  (STANDING):  aspirin  chewable 81 milliGRAM(s) Oral daily  dextrose 5%. 1000 milliLiter(s) (100 mL/Hr) IV Continuous <Continuous>  dextrose 5%. 1000 milliLiter(s) (50 mL/Hr) IV Continuous <Continuous>  dextrose 50% Injectable 25 Gram(s) IV Push once  dextrose 50% Injectable 12.5 Gram(s) IV Push once  dextrose 50% Injectable 25 Gram(s) IV Push once  furosemide    Tablet 40 milliGRAM(s) Oral two times a day  glucagon  Injectable 1 milliGRAM(s) IntraMuscular once  heparin  Infusion.  Unit(s)/Hr (12 mL/Hr) IV Continuous <Continuous>  insulin glargine Injectable (LANTUS) 5 Unit(s) SubCutaneous at bedtime  insulin lispro (ADMELOG) corrective regimen sliding scale   SubCutaneous three times a day before meals  insulin lispro (ADMELOG) corrective regimen sliding scale   SubCutaneous at bedtime  insulin lispro Injectable (ADMELOG) 6 Unit(s) SubCutaneous three times a day before meals  isosorbide   dinitrate Tablet (ISORDIL) 10 milliGRAM(s) Oral three times a day  metoprolol succinate ER 75 milliGRAM(s) Oral daily  sodium bicarbonate 650 milliGRAM(s) Oral two times a day  spironolactone 50 milliGRAM(s) Oral daily      Physical Exam  General: Patient comfortable in bed   Vital Signs Last 12 Hrs  T(F): 97.5 (02-04-25 @ 12:31), Max: 97.8 (02-04-25 @ 04:47)  HR: 74 (02-04-25 @ 12:31) (72 - 74)  BP: 150/81 (02-04-25 @ 12:31) (142/93 - 150/81)  BP(mean): --  RR: 18 (02-04-25 @ 12:31) (18 - 18)  SpO2: 99% (02-04-25 @ 12:31) (97% - 99%)    CVS: S1S2   Respiratory: No wheezing, no crepitations  GI: Abdomen soft, bowel sounds positive  Musculoskeletal:  moves all extremities

## 2025-02-04 NOTE — DISCHARGE NOTE PROVIDER - CARE PROVIDER_API CALL
Bert Walton  Cardiovascular Disease  [address], [address]  [place] [address]  Phone: [phone]  Fax: [phone]  Follow Up Time: 1-3 days    Oniel Cordon  Pulmonary Disease  [address], [address]  [place] [address]  Phone: [phone]  Fax: [phone]  Follow Up Time: 1 week    Markell Walton  Nephrology  [address]  [place] [address]  Phone: [phone]  Fax: [phone]  Follow Up Time: 1 week    Jean Claude Olivo  Podiatric Medicine and Surgery  [address], [place] [address]  Phone: [phone]  Fax: [phone]  Follow Up Time: 1 week    Tyrone Calle  Vascular Surgery  [address], [address]  [place] [address]  Phone: [phone]  Fax: [phone]  Follow Up Time: 1 week   Bert Walton  Cardiovascular Disease  [address], [address]  [place] [address]  Phone: [phone]  Fax: [phone]  Follow Up Time: 1-3 days    Oniel Cordon  Pulmonary Disease  [address], [address]  [place] [address]  Phone: [phone]  Fax: [phone]  Follow Up Time: 1 week    Markell Walton  Nephrology  [address]  [place] [address]  Phone: [phone]  Fax: [phone]  Follow Up Time: 1 week    Jean Claude Olivo  Podiatric Medicine and Surgery  [address], [place] [address]  Phone: [phone]  Fax: [phone]  Follow Up Time: 1 week    Tyrone Calle  Vascular Surgery  [address]  [place] [address]  Phone: [phone]  Fax: [phone]  Follow Up Time: 1 week    Myrna Rossi)  Endocrinology/Metab/Diabetes  [address] [place] [address]  Phone: [phone]  Fax: [phone]  Follow Up Time: 1 week   Oniel Cordon  Pulmonary Disease  [address]  [place] [address]  Phone: [phone]  Fax: [phone]  Follow Up Time: 1 week    Markell Walton  Nephrology  [address]  [place] [address]  Phone: [phone]  Fax: [phone]  Follow Up Time: 1 week    Jean Claude Olivo  Podiatric Medicine and Surgery  [address], [place] [address]  Phone: [phone]  Fax: [phone]  Follow Up Time: 1 week    Tyrone Calle  Vascular Surgery  [address]  [place] [address]  Phone: [phone]  Fax: [phone]  Follow Up Time: 1 week    Myrna Rossi)  Endocrinology/Metab/Diabetes  [address] [place] [address]  Phone: [phone]  Fax: [phone]  Follow Up Time: 1 week    Alfred Moon  Cardiology  [address] [place] [address]  Phone: [phone]  Fax: [phone]  Established Patient  Scheduled Appointment: 02/11/2025 01:45 PM

## 2025-02-04 NOTE — DISCHARGE NOTE PROVIDER - NSFOLLOWUPCLINICS_GEN_ALL_ED_FT
[facility] Endocrinology  Endocrinology  [address] [place] [address]  Phone: [phone]  Fax:   Follow Up Time: 1 week     [facility] Card[facility]  Cardiology  [place]   Phone:   Fax:   Follow Up Time: 1-3 days

## 2025-02-04 NOTE — DISCHARGE NOTE PROVIDER - NSDCMRMEDTOKEN_GEN_ALL_CORE_FT
dapagliflozin 10 mg oral tablet: 1 tab(s) orally once a day  furosemide 40 mg oral tablet: 1 tab(s) orally once a day  insulin glargine 100 units/mL subcutaneous solution: 10 unit(s) subcutaneous once a day (at bedtime)  insulin lispro 100 units/mL injectable solution: 11 unit(s) subcutaneous 3 times a day (before meals)  isosorbide dinitrate 10 mg oral tablet: 1 tab(s) orally 3 times a day  metoprolol succinate 25 mg oral tablet, extended release: 3 tab(s) orally once a day  pentoxifylline 400 mg oral tablet, extended release: 1 tab(s) orally 2 times a day  rivaroxaban 15 mg oral tablet: 1 tab(s) orally once a day (before a meal)  sodium bicarbonate 650 mg oral tablet: 1 tab(s) orally 3 times a day  spironolactone 25 mg oral tablet: 2 tab(s) orally once a day   aspirin 81 mg oral tablet, chewable: 1 tab(s) orally once a day  dapagliflozin 10 mg oral tablet: 1 tab(s) orally once a day  furosemide 40 mg oral tablet: 1 tab(s) orally once a day  insulin glargine 100 units/mL subcutaneous solution: 15 unit(s) subcutaneous once a day (at bedtime)  insulin lispro 100 units/mL injectable solution: 7 unit(s) injectable 3 times a day (before meals)  isosorbide dinitrate 10 mg oral tablet: 1 tab(s) orally 3 times a day  metoprolol succinate 25 mg oral tablet, extended release: 3 tab(s) orally once a day  pentoxifylline 400 mg oral tablet, extended release: 1 tab(s) orally 2 times a day  rivaroxaban 15 mg oral tablet: 1 tab(s) orally once a day (before a meal)  sodium bicarbonate 650 mg oral tablet: 1 tab(s) orally 3 times a day  spironolactone 25 mg oral tablet: 2 tab(s) orally once a day   Admelog SoloStar 100 units/mL injectable solution: 7 unit(s) injectable 3 times a day (before meals)  aspirin 81 mg oral tablet, chewable: 1 tab(s) orally once a day  dapagliflozin 10 mg oral tablet: 1 tab(s) orally once a day  furosemide 40 mg oral tablet: 1 tab(s) orally once a day  isosorbide dinitrate 10 mg oral tablet: 1 tab(s) orally 3 times a day  Lantus Solostar Pen 100 units/mL subcutaneous solution: 15 unit(s) subcutaneous once a day (at bedtime)  metoprolol succinate 25 mg oral tablet, extended release: 3 tab(s) orally once a day  pentoxifylline 400 mg oral tablet, extended release: 1 tab(s) orally 2 times a day  rivaroxaban 15 mg oral tablet: 1 tab(s) orally once a day (before a meal)  sodium bicarbonate 650 mg oral tablet: 1 tab(s) orally 3 times a day  spironolactone 25 mg oral tablet: 2 tab(s) orally once a day   aspirin 81 mg oral tablet, chewable: 1 tab(s) orally once a day  dapagliflozin 10 mg oral tablet: 1 tab(s) orally once a day  furosemide 40 mg oral tablet: 1 tab(s) orally once a day  HumaLOG KwikPen 100 units/mL injectable solution: 7 unit(s) subcutaneous 3 times a day (before meals) unit(s) subcutaneous 3 times a day (with meals)  isosorbide dinitrate 10 mg oral tablet: 1 tab(s) orally 3 times a day  Lantus Solostar Pen 100 units/mL subcutaneous solution: 15 unit(s) subcutaneous once a day (at bedtime)  metoprolol succinate 25 mg oral tablet, extended release: 3 tab(s) orally once a day  pentoxifylline 400 mg oral tablet, extended release: 1 tab(s) orally 2 times a day  rivaroxaban 15 mg oral tablet: 1 tab(s) orally once a day (before a meal)  sodium bicarbonate 650 mg oral tablet: 1 tab(s) orally 3 times a day  spironolactone 25 mg oral tablet: 2 tab(s) orally once a day   aspirin 81 mg oral tablet, chewable: 1 tab(s) orally once a day  dapagliflozin 10 mg oral tablet: 1 tab(s) orally once a day  furosemide 40 mg oral tablet: 1 tab(s) orally once a day  HumaLOG KwikPen 100 units/mL injectable solution: 7 unit(s) subcutaneous 3 times a day (before meals) unit(s) subcutaneous 3 times a day (with meals)  isosorbide dinitrate 10 mg oral tablet: 1 tab(s) orally 3 times a day  Lantus Solostar Pen 100 units/mL subcutaneous solution: 15 unit(s) subcutaneous once a day (at bedtime)  metoprolol succinate 25 mg oral tablet, extended release: 3 tab(s) orally once a day  rivaroxaban 15 mg oral tablet: 1 tab(s) orally once a day (before a meal)  sodium bicarbonate 650 mg oral tablet: 1 tab(s) orally 3 times a day  spironolactone 25 mg oral tablet: 2 tab(s) orally once a day   aspirin 81 mg oral tablet, chewable: 1 tab(s) orally once a day  dapagliflozin 10 mg oral tablet: 1 tab(s) orally once a day  furosemide 40 mg oral tablet: 1 tab(s) orally once a day  HumaLOG KwikPen 100 units/mL injectable solution: 7 unit(s) subcutaneous 3 times a day (before meals)  isosorbide dinitrate 10 mg oral tablet: 1 tab(s) orally 3 times a day  Lantus Solostar Pen 100 units/mL subcutaneous solution: 15 unit(s) subcutaneous once a day (at bedtime)  metoprolol succinate 25 mg oral tablet, extended release: 3 tab(s) orally once a day  rivaroxaban 15 mg oral tablet: 1 tab(s) orally once a day (before a meal)  sodium bicarbonate 650 mg oral tablet: 1 tab(s) orally 3 times a day  spironolactone 25 mg oral tablet: 2 tab(s) orally once a day

## 2025-02-04 NOTE — DISCHARGE NOTE PROVIDER - NSDCFUSCHEDAPPT_GEN_ALL_CORE_FT
Tyrone Calle  [facility] Physician [facility]  VASCULAR [address]  Scheduled Appointment: 02/18/2025

## 2025-02-04 NOTE — PROGRESS NOTE ADULT - ASSESSMENT
Assessment  DMT2: 69y Male with DM T2 with hyperglycemia, A1C 6.5, was on oral dapagliflozin and basal lantus 10u &  lispro 11u at home, blood sugars fluctuating and had hypoglycemia episode,resolved .  CAD: on medications, stable, monitored.  HTN: on antihypertensive medications, monitored, asymptomatic.  CKD: Monitor labs/BMP,       Discussed plan and management wit Dr Flo Rossi MD  Cell: [phone]  Office: [phone]             Assessment  DMT2: 69y Male with DM T2 with hyperglycemia, A1C 6.5, was on oral dapagliflozin and basal lantus 10u &  lispro 11u at home, blood sugars fluctuating and had hypoglycemia episode,resolved .  CAD: on medications, stable, monitored.  HTN: on antihypertensive medications, monitored, asymptomatic.  CKD: Monitor labs/BMP,         Discussed plan and management wit Dr Flo Rossi MD  Cell: [phone]  Office: [phone]

## 2025-02-04 NOTE — CONSULT NOTE ADULT - NS_MD_PANP_GEN_ALL_CORE
Attending and PA/NP shared services statement (NON-critical care):
Attending and PA/NP shared services statement (NON-critical care):
Initial (On Arrival)

## 2025-02-04 NOTE — CONSULT NOTE ADULT - NS ATTEND AMEND GEN_ALL_CORE FT
Chart, labs, vitals, radiology reviewed. Above H&P reviewed and edited where appropriate. Agree with history and physical exam. Agree with assessment and plan. I reviewed the overnight course of events and discussed the care with the patient/ family. All the decisions in assessment and plan are made by me.
pt on ra no sob cxr clear  sat 99%  continue duoneb q6h prn  cards fu  dw pt and md

## 2025-02-04 NOTE — CONSULT NOTE ADULT - PROBLEM SELECTOR RECOMMENDATION 2
On medications,  no chest pain, stable, monitored and followed up by primary cardiology team.
RVP + RSV  -Also recently flu A infection (remains positive on RVP)  -CXR grossly clear  -Supportive care  -Duoneb q6h PRN.

## 2025-02-04 NOTE — DISCHARGE NOTE PROVIDER - CARE PROVIDERS DIRECT ADDRESSES
,[email or web address].[employer],DirectAddress_Unknown,[email or web address].Randolph HealthPlastic Jungle.[email or web address] Island HospitalriQDEGA Loyalty Solutions [email or web address] ,[email or web address] Rehabilitationi.[employer],DirectAddress_Unknown,[email or web address] Women's [email or web address].[email or web address],[email or web address]@[facility] HospitalInsitu MobileThe Specialty Hospital of [facility]idian.[employer] Resourcesrect.net,[email or web address],DirectAddress_Unknown ,DirectAddress_Unknown,[email or web address] Medical CenterSrd [email or web address] Health Rehabilitation Hospital.allWind Energy [email or web address],DirectAddress_Unknown,[email or web address]

## 2025-02-04 NOTE — DISCHARGE NOTE PROVIDER - PROVIDER TOKENS
PROVIDER:[TOKEN:[[id number]],FOLLOWUP:[1-3 days]],PROVIDER:[TOKEN:[[id number]],FOLLOWUP:[1 week]],PROVIDER:[TOKEN:[[id number]],FOLLOWUP:[1 week]],PROVIDER:[TOKEN:[[id number]],FOLLOWUP:[1 week]],PROVIDER:[TOKEN:[[id number]],FOLLOWUP:[1 week]] PROVIDER:[TOKEN:[[id number]],FOLLOWUP:[1-3 days]],PROVIDER:[TOKEN:[[id number]],FOLLOWUP:[1 week]],PROVIDER:[TOKEN:[[id number]],FOLLOWUP:[1 week]],PROVIDER:[TOKEN:[[id number]],FOLLOWUP:[1 week]],PROVIDER:[TOKEN:[[id number]],FOLLOWUP:[1 week]],PROVIDER:[TOKEN:[[id number]],FOLLOWUP:[1 week]] PROVIDER:[TOKEN:[[id number]],FOLLOWUP:[1 week]],PROVIDER:[TOKEN:[[id number]],FOLLOWUP:[1 week]],PROVIDER:[TOKEN:[[id number]],FOLLOWUP:[1 week]],PROVIDER:[TOKEN:[[id number]],FOLLOWUP:[1 week]],PROVIDER:[TOKEN:[[id number]],FOLLOWUP:[1 week]],PROVIDER:[TOKEN:[[id number]],SCHEDULEDAPPT:[02/11/2025],SCHEDULEDAPPTTIME:[01:45 PM],ESTABLISHEDPATIENT:[T]]

## 2025-02-04 NOTE — PROGRESS NOTE ADULT - SUBJECTIVE AND OBJECTIVE BOX
Overnight events noted      VITAL:  T(C): , Max: 36.7 (02-03-25 @ 11:49)  T(F): , Max: 98.1 (02-03-25 @ 21:11)  HR: 72 (02-04-25 @ 04:47)  BP: 142/93 (02-04-25 @ 04:47)  BP(mean): --  RR: 18 (02-04-25 @ 04:47)  SpO2: 97% (02-04-25 @ 04:47)  Wt(kg): --      PHYSICAL EXAM:  Constitutional: NAD, Alert  HEENT: NCAT, DMM  Neck: Supple, No JVD  Respiratory: CTA-b/l  Cardiovascular: reg s1s2  Gastrointestinal: BS+, soft, NT/ND  Extremities: No peripheral edema b/l  Neurological: no focal deficits; strength grossly intact  Back: no CVAT b/l  Skin: No rashes, no nevi    LABS:                        14.6   9.00  )-----------( 276      ( 04 Feb 2025 06:09 )             44.4     Na(133)/K(4.4)/Cl(100)/HCO3(18)/BUN(52)/Cr(1.98)Glu(184)/Ca(9.6)/Mg(--)/PO4(--)    02-04 @ 06:09  Na(134)/K(4.8)/Cl(98)/HCO3(23)/BUN(50)/Cr(2.18)Glu(192)/Ca(9.9)/Mg(2.2)/PO4(3.3)    02-03 @ 06:47  Na(135)/K(3.5)/Cl(100)/HCO3(20)/BUN(46)/Cr(2.09)Glu(116)/Ca(9.0)/Mg(--)/PO4(3.1)    02-02 @ 06:00      IMPRESSION: 69M w/ HTN, DM2, AFib, CAD-10 stents, HFrEF, PAD, and CKD, 1/31/25 p/w SOB in association with RSV    (1)Renal - 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 Numbers fluctuating based on hemodynamic status    (2)Acidosis - worsening, off NaHCO3    (3)CV - acceptable volume at present     (4)ID - RSV - clinically improved      RECOMMEND:  (1)Lasix and Spironolactone as ordered  (2)Add back NaHCO3 PO  (3)D/C planning per primary team; can f/u at my office as previously scheduled (Wed 2/19/25 at 1040p)                  Markell Walton MD  [facility]  Office/on call physician: [phone]  Cell (7a-7p): [phone]       No pain, no sob    VITAL:  T(C): , Max: 36.7 (02-03-25 @ 11:49)  T(F): , Max: 98.1 (02-03-25 @ 21:11)  HR: 72 (02-04-25 @ 04:47)  BP: 142/93 (02-04-25 @ 04:47)  BP(mean): --  RR: 18 (02-04-25 @ 04:47)  SpO2: 97% (02-04-25 @ 04:47)  Wt(kg): --      PHYSICAL EXAM:  Constitutional: NAD, Alert  HEENT: NCAT, DMM  Neck: Supple, No JVD  Respiratory: CTA-b/l  Cardiovascular: reg s1s2  Gastrointestinal: BS+, soft, NT/ND  Extremities: No peripheral edema b/l  Neurological: no focal deficits; strength grossly intact  Back: no CVAT b/l  Skin: No rashes, no nevi    LABS:                        14.6   9.00  )-----------( 276      ( 04 Feb 2025 06:09 )             44.4     Na(133)/K(4.4)/Cl(100)/HCO3(18)/BUN(52)/Cr(1.98)Glu(184)/Ca(9.6)/Mg(--)/PO4(--)    02-04 @ 06:09  Na(134)/K(4.8)/Cl(98)/HCO3(23)/BUN(50)/Cr(2.18)Glu(192)/Ca(9.9)/Mg(2.2)/PO4(3.3)    02-03 @ 06:47  Na(135)/K(3.5)/Cl(100)/HCO3(20)/BUN(46)/Cr(2.09)Glu(116)/Ca(9.0)/Mg(--)/PO4(3.1)    02-02 @ 06:00      IMPRESSION: 69M w/ HTN, DM2, AFib, CAD-10 stents, HFrEF, PAD, and CKD, 1/31/25 p/w SOB in association with RSV    (1)Renal - 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 Numbers fluctuating based on hemodynamic status    (2)Acidosis - worsening, off NaHCO3    (3)CV - acceptable volume at present     (4)ID - RSV - clinically improved      RECOMMEND:  (1)Lasix and Spironolactone as ordered  (2)Add back NaHCO3 PO  (3)D/C planning per primary team; can f/u at my office as previously scheduled (Wed 2/19/25 at 1040p)                  Markell Walton MD  [facility]  Office/on call physician: [phone]  Cell (7a-7p): [phone]       no pain, no sob    VITAL:  T(C): , Max: 36.7 (02-03-25 @ 11:49)  T(F): , Max: 98.1 (02-03-25 @ 21:11)  HR: 72 (02-04-25 @ 04:47)  BP: 142/93 (02-04-25 @ 04:47)  BP(mean): --  RR: 18 (02-04-25 @ 04:47)  SpO2: 97% (02-04-25 @ 04:47)  Wt(kg): --      PHYSICAL EXAM:  Constitutional: NAD, Alert  HEENT: NCAT, DMM  Neck: Supple, No JVD  Respiratory: CTA-b/l  Cardiovascular: reg s1s2  Gastrointestinal: BS+, soft, NT/ND  Extremities: No peripheral edema b/l  Neurological: no focal deficits; strength grossly intact  Back: no CVAT b/l  Skin: No rashes, no nevi    LABS:                        14.6   9.00  )-----------( 276      ( 04 Feb 2025 06:09 )             44.4     Na(133)/K(4.4)/Cl(100)/HCO3(18)/BUN(52)/Cr(1.98)Glu(184)/Ca(9.6)/Mg(--)/PO4(--)    02-04 @ 06:09  Na(134)/K(4.8)/Cl(98)/HCO3(23)/BUN(50)/Cr(2.18)Glu(192)/Ca(9.9)/Mg(2.2)/PO4(3.3)    02-03 @ 06:47  Na(135)/K(3.5)/Cl(100)/HCO3(20)/BUN(46)/Cr(2.09)Glu(116)/Ca(9.0)/Mg(--)/PO4(3.1)    02-02 @ 06:00      IMPRESSION: 69M w/ HTN, DM2, AFib, CAD-10 stents, HFrEF, PAD, and CKD, 1/31/25 p/w SOB in association with RSV    (1)Renal - 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 Numbers fluctuating based on hemodynamic status    (2)Acidosis - worsening, off NaHCO3    (3)CV - acceptable volume at present     (4)ID - RSV - clinically improved      RECOMMEND:  (1)Lasix and Spironolactone as ordered  (2)Add back NaHCO3 PO  (3)D/C planning per primary team; can f/u at my office as previously scheduled (Wed 2/19/25 at 1040p)                  Markell Walton MD  [facility]  Office/on call physician: [phone]  Cell (7a-7p): [phone]

## 2025-02-04 NOTE — DISCHARGE NOTE PROVIDER - NSDCFUADDAPPT_GEN_ALL_CORE_FT
APPTS ARE READY TO BE MADE: [ ] YES    Best Family or Patient Contact (if needed):    Additional Information about above appointments (if needed):    1: PCP  2: cardio  3: podiatry  4: vascular  5: renal  6: endo    Other comments or requests:    APPTS ARE READY TO BE MADE: [X] YES    Best Family or Patient Contact (if needed):    Additional Information about above appointments (if needed):    1: PCP  2: Cardiology / Home - Cardiology  3: podiatry  4: vascular  5: renal  6: endocrine     Other comments or requests:    APPTS ARE READY TO BE MADE: [X] YES    Best Family or Patient Contact (if needed):    Additional Information about above appointments (if needed):    1: PCP  2: Home - Cardiology  3: podiatry  4: vascular  5: renal  6: endocrine     Other comments or requests:

## 2025-02-04 NOTE — CONSULT NOTE ADULT - ASSESSMENT
dz  RVP+  Influenza A- suspect that this is residua from recent infection, not active/new infection  RSV- while test is positive and merits precautions per protocol, not hypoxic on RA, no respiratory complaints (no cough etc., just CLEARY), lung fields without rales, wheezing, or rhonchi, CXR unchanged compared with prior.  Treatment for RSV is supportive  I see no role for antibiotics  CLEARY suspect primarily on cardiac basis given known CAD    Suggestions--  Contact and droplet precautions for 5 days.  Defer antimicrobials  Await cath report  Trend creatinine  D/W patient and his wife  Left message for Dr. Mcdaniel    Thank you for the courtesy of this referral.  .pxz dz  RVP+  Influenza A- suspect that this is residua from recent infection, not active/new infection  RSV- while test is positive and merits precautions per protocol, not hypoxic on RA, no respiratory complaints (no cough etc., just CLEARY), lung fields without rales, wheezing, or rhonchi, CXR unchanged compared with prior.  Treatment for RSV is supportive  I see no role for antibiotics  CLEARY suspect primarily on cardiac basis given known CAD    Suggestions--  Contact and droplet precautions for 5 days.  Defer antimicrobials  Await cath report  Trend creatinine  D/W patient and his wife  Left message for Dr. Mcdaniel    Thank you for the courtesy of this referral.  Chicho Ho MD  Attending Physician  [facility]  Division of Infectious Diseases  [phone]

## 2025-02-04 NOTE — DISCHARGE NOTE PROVIDER - NPI NUMBER (FOR SYSADMIN USE ONLY) :
[[id number]] [[id number]],[[id number]],[[id number]],[[id number]],[[id number]] [[id number]],[[id number]],[[id number]],[[id number]],[[id number]],[[id number]]

## 2025-02-04 NOTE — PROGRESS NOTE ADULT - ASSESSMENT
#h/o ashd s/p multiple pci, last pci dLAD 4/17/24 by Dr ILDA Dominguez, known severe inoperable CAD   #HTN  #DM (Dr Reyes)  #ckd (Dr Christophe Walton)  #s/p cea by Dr Calle  # hfref ef last 47%  #AF usually on xarelto, currently on heparin.   #s/p Left foot infection   #RSV with likely fluid overload.   As discussed with Joce Moon and Violeta, Dr Julito Dominguez to give opinion as to whether repeat cath would be indicated, considering risk/benefit ratio.   Stable at present cv perspective.

## 2025-02-04 NOTE — DISCHARGE NOTE PROVIDER - NSDCCPCAREPLAN_GEN_ALL_CORE_FT
PRINCIPAL DISCHARGE DIAGNOSIS  Diagnosis: Dyspnea on exertion  Assessment and Plan of Treatment: Weigh yourself daily.  If you gain 3lbs in 3 days, or 5lbs in a week call your Health Care Provider.  Do not eat or drink foods containing more than 2000mg of salt (sodium) in your diet every day.  Call your Health Care Provider if you have any swelling or increased swelling in your feet, ankles, and/or stomach.  Take all of your medication as directed.  If you become dizzy call your Health Care Provider.        SECONDARY DISCHARGE DIAGNOSES  Diagnosis: Acute on chronic HFrEF (heart failure with reduced ejection fraction)  Assessment and Plan of Treatment: Weigh yourself daily.  If you gain 3lbs in 3 days, or 5lbs in a week call your Health Care Provider.  Do not eat or drink foods containing more than 2000mg of salt (sodium) in your diet every day.  Call your Health Care Provider if you have any swelling or increased swelling in your feet, ankles, and/or stomach.  Take all of your medication as directed.  If you become dizzy call your Health Care Provider.       PRINCIPAL DISCHARGE DIAGNOSIS  Diagnosis: Acute systolic congestive heart failure  Assessment and Plan of Treatment: Weigh yourself daily.  If you gain 3lbs in 3 days, or 5lbs in a week call your Health Care Provider.  Do not eat or drink foods containing more than 2000mg of salt (sodium) in your diet every day.  Call your Health Care Provider if you have any swelling or increased swelling in your feet, ankles, and/or stomach.  Take all of your medication as directed.  If you become dizzy call your Health Care Provider.        SECONDARY DISCHARGE DIAGNOSES  Diagnosis: Near syncope  Assessment and Plan of Treatment: HOME CARE INSTRUCTIONS  Have someone stay with you until you feel stable.  Do not drive, operate machinery, or play sports until your caregiver says it is okay.  Keep all follow-up appointments as directed by your caregiver.   Lie down right away if you start feeling like you might faint. Breathe deeply and steadily. Wait until all the symptoms have passed.Drink enough fluids to keep your urine clear or pale yellow.  If you are taking blood pressure or heart medicine, get up slowly, taking several minutes to sit and then stand. This can reduce dizziness.  SEEK IMMEDIATE MEDICAL CARE IF:  You have a severe headache.  You have unusual pain in the chest, abdomen, or back.  You are bleeding from the mouth or rectum, or you have black or tarry stool.  You have an irregular or very fast heartbeat.  You have pain with breathing.  You have repeated fainting or seizure-like jerking during an episode.  You faint when sitting or lying down.  You have confusion.  You have difficulty walking.  You have severe weakness.  You have vision problems.      Diagnosis: Demand ischemia of myocardium  Assessment and Plan of Treatment:   HOME CARE INSTRUCTIONS  For the next few days, avoid physical activities that bring on chest pain. Continue physical activities as directed.  Do not smoke.  Avoid drinking alcohol.   Only take over-the-counter or prescription medicine for pain, discomfort, or fever as directed by your caregiver.  Follow your caregiver's suggestions for further testing if your chest pain does not go away.  Keep any follow-up appointments you made. If you do not go to an appointment, you could develop lasting (chronic) problems with pain. If there is any problem keeping an appointment, you must call to reschedule.   SEEK MEDICAL CARE IF:  You think you are having problems from the medicine you are taking. Read your medicine instructions carefully.  Your chest pain does not go away, even after treatment.  You develop a rash with blisters on your chest.  SEEK IMMEDIATE MEDICAL CARE IF:  You have increased chest pain or pain that spreads to your arm, neck, jaw, back, or abdomen.   You develop shortness of breath, an increasing cough, or you are coughing up blood.  You have severe back or abdominal pain, feel nauseous, or vomit.  You develop severe weakness, fainting, or chills.  You have a fever.      Diagnosis: BARRY (acute kidney injury)  Assessment and Plan of Treatment: Avoid taking (NSAIDs) - (ex: Ibuprofen, Advil, Celebrex, Naprosyn)  Avoid taking any nephrotoxic agents (can harm kidneys) - Intravenous contrast for diagnostic testing, combination cold medications.  Have all medications adjusted for your renal function by your Health Care Provider.  Blood pressure control is important.  Take all medication as prescribed.      Diagnosis: Chronic atrial fibrillation  Assessment and Plan of Treatment: Atrial fibrillation is the most common heart rhythm problem.  The condition puts you at risk for has stroke and heart attack  It helps if you control your blood pressure, not drink more than 1-2 alcohol drinks per day, cut down on caffeine, getting treatment for over active thyroid gland, and get regular exercise  Call your doctor if you feel your heart racing or beating unusually, chest tightness or pain, lightheaded, faint, shortness of breath especially with exercise  It is important to take your heart medication as prescribed  You may be on anticoagulation which is very important to take as directed - you may need blood work to monitor drug levels      Diagnosis: RSV infection  Assessment and Plan of Treatment: Supportive care

## 2025-02-04 NOTE — CHART NOTE - NSCHARTNOTEFT_GEN_A_CORE
Wound Care Team Note:    Request for wound care consult for foot/toe wound received and referred to podiatry. Will defer to podiatry for management. Please contact Podiatry for questions/concerns. Will not follow.    MARY JeronimoC, CWOCN via TEAMS.

## 2025-02-04 NOTE — PROGRESS NOTE ADULT - ASSESSMENT
70 y/o male with hx of CAD s/p multiple  stents, HFrEF (EF~50% as of 11/26/23), A-fib, HTN, HLD, T2DM, CKD3, former smoke discharged last week after being admitted for acute on chronic chf and influenza now admitted w worsening chf and near syncope       near syncope : ct neg  check orthostatics : neg     RSV : monitor O2   no wheezing on exam   supportive care   doubt contributing to his sob        Acute on chronic HFrEF (heart failure with reduced ejection fraction).   cont diuresis : changed to po  questionable compliance ?   d/w wife       CAD (coronary artery disease).   cont cardiac meds   fu cards   unclear if his exertional sob is  sec to RSV and recent influenza ..pt is not wheezing.. anginal equivilant ??  d/w pul : not likley contributing   d/w Dr. Walton and Branodn:  underwent cath noted   cont medical management         ·  Problem: Chronic atrial fibrillation.   hold xarelto   cont heparin for now       ·  Problem: History of peripheral vascular disease.   off trental   d/w vasc:  add aspirin     ·  Problem: Stage 3 chronic kidney disease.   ·  Plan: Creatinine at baseline  Dose meds for CKD3b  Avoid nephrotoxins.     Diabetes type 2.   monitor FS

## 2025-02-04 NOTE — PROGRESS NOTE ADULT - SUBJECTIVE AND OBJECTIVE BOX
Date of service: 25 @ 23:39      Patient is a 69y old  Male who presents with a chief complaint of HF exacerbation (2025 16:30)                                                               INTERVAL HPI/OVERNIGHT EVENTS:    REVIEW OF SYSTEMS:     no cough  no sob /cp while sitting                                                                                                                                                                                                                                                                   Medications:  MEDICATIONS  (STANDING):  aspirin  chewable 81 milliGRAM(s) Oral daily  dextrose 5%. 1000 milliLiter(s) (100 mL/Hr) IV Continuous <Continuous>  dextrose 5%. 1000 milliLiter(s) (50 mL/Hr) IV Continuous <Continuous>  dextrose 50% Injectable 25 Gram(s) IV Push once  dextrose 50% Injectable 12.5 Gram(s) IV Push once  dextrose 50% Injectable 25 Gram(s) IV Push once  furosemide    Tablet 40 milliGRAM(s) Oral two times a day  glucagon  Injectable 1 milliGRAM(s) IntraMuscular once  heparin  Infusion. 800 Unit(s)/Hr (8 mL/Hr) IV Continuous <Continuous>  insulin glargine Injectable (LANTUS) 8 Unit(s) SubCutaneous at bedtime  insulin lispro (ADMELOG) corrective regimen sliding scale   SubCutaneous three times a day before meals  insulin lispro (ADMELOG) corrective regimen sliding scale   SubCutaneous at bedtime  insulin lispro Injectable (ADMELOG) 6 Unit(s) SubCutaneous three times a day before meals  isosorbide   dinitrate Tablet (ISORDIL) 10 milliGRAM(s) Oral three times a day  metoprolol succinate ER 75 milliGRAM(s) Oral daily  sodium bicarbonate 650 milliGRAM(s) Oral two times a day  spironolactone 50 milliGRAM(s) Oral daily    MEDICATIONS  (PRN):  dextrose Oral Gel 15 Gram(s) Oral once PRN Blood Glucose LESS THAN 70 milliGRAM(s)/deciliter  heparin   Injectable 5500 Unit(s) IV Push every 6 hours PRN For aPTT less than 40  heparin   Injectable 2500 Unit(s) IV Push every 6 hours PRN For aPTT between 40 - 57       Allergies    atorvastatin (Muscle Pain (Severe))    Intolerances      Vital Signs Last 24 Hrs  T(C): 36.3 (2025 20:57), Max: 36.6 (2025 04:47)  T(F): 97.4 (2025 20:57), Max: 97.8 (2025 04:47)  HR: 67 (2025 20:57) (67 - 91)  BP: 147/77 (2025 20:57) (138/89 - 173/77)  BP(mean): --  RR: 18 (2025 20:57) (18 - 18)  SpO2: 97% (2025 20:57) (95% - 99%)    Parameters below as of 2025 20:57  Patient On (Oxygen Delivery Method): room air      CAPILLARY BLOOD GLUCOSE      POCT Blood Glucose.: 176 mg/dL (2025 21:52)  POCT Blood Glucose.: 157 mg/dL (2025 16:46)  POCT Blood Glucose.: 163 mg/dL (2025 12:11)  POCT Blood Glucose.: 193 mg/dL (2025 08:18)       @ 07:01  -  04 @ 07:00  --------------------------------------------------------  IN: 910 mL / OUT: 2550 mL / NET: -1640 mL    04 @ 07:01  -  04 @ 23:39  --------------------------------------------------------  IN: 960 mL / OUT: 1850 mL / NET: -890 mL      Physical Exam:    Daily     Daily Weight in k.2 (2025 08:26)  General:  Well appearing, NAD, not cachetic  HEENT:  Nonicteric, PERRLA  CV:  RRR, S1S2   Lungs:  CTA B/L, no wheezes, rales, rhonchi  Abdomen:  Soft, non-tender, no distended, positive BS  Extremities: edema                                                                                                                                                                                                                                                                                        LABS:                               14.6   9.00  )-----------( 276      ( 2025 06:09 )             44.4                          133[L]  |  100  |  52[H]  ----------------------------<  184[H]  4.4   |  18[L]  |  1.98[H]    Ca    9.6      2025 06:09  Phos  3.3     02-03  Mg     2.2     02-03                         RADIOLOGY & ADDITIONAL TESTS         I personally reviewed: [  ]EKG   [  ]CXR    [  ] CT      A/P:         Discussed with :     Derek consultants' Notes   Time spent :

## 2025-02-04 NOTE — CONSULT NOTE ADULT - PROBLEM SELECTOR RECOMMENDATION 9
-Possibly 2nd to mild fluid overload + RSV infection  -Pt reports SOB has now resolved  -CXR grossly clear  -Duoneb q6h PRN  -Diuresis per cards, keep O>I as tolerated  -Keep sats >90% with O2 PRN. -Possibly 2nd to mild fluid overload + RSV infection  -Pt reports SOB has now resolved at rest, +CLEARY  -CXR grossly clear  -Duoneb q6h PRN  -Diuresis per cards, keep O>I as tolerated  -Keep sats >90% with O2 PRN.

## 2025-02-04 NOTE — CONSULT NOTE ADULT - SUBJECTIVE AND OBJECTIVE BOX
[facility]  Division of Infectious Diseases  [phone]    KRISTY COYLE  69y, Male  [id number]    HPI--  69M seen by me last month with concern of LE cellulitis; PMH HTN, CHF, CAD/stents, DM2, CRI, statin-related rhabdomyolysis, PAD, peripheral neuropathy now readmitted with worsening SOB. Patient treated with a brief course of antibiotics vs. cellulitis last admission, also acquired influenza while in the hospital. Here noted to be + for RSV. He is lying flat in bed on RA, s/p cardiac cath.       PMH/PSH--  Carotid Stenosis    Diabetes Mellitus    Neuropathy    Former smoker    CAD in native artery    Type 2 diabetes mellitus    Hyperlipidemia, unspecified hyperlipidemia type    Essential hypertension, hypertension with unspecified goal    Afib    Hematoma of leg    Stented coronary artery    CHF (congestive heart failure)    No Past Surgical History    s/p Carotid Endarterectomy        Allergies--  atorvastatin (Muscle Pain (Severe))      Medications--  Antibiotics:   Immunologic:   Other: aspirin  chewable  dextrose 5%.  dextrose 5%.  dextrose 50% Injectable  dextrose 50% Injectable  dextrose 50% Injectable  dextrose Oral Gel PRN  furosemide    Tablet  glucagon  Injectable  heparin   Injectable PRN  heparin   Injectable PRN  heparin  Infusion.  insulin glargine Injectable (LANTUS)  insulin lispro (ADMELOG) corrective regimen sliding scale  insulin lispro (ADMELOG) corrective regimen sliding scale  insulin lispro Injectable (ADMELOG)  isosorbide   dinitrate Tablet (ISORDIL)  metoprolol succinate ER  sodium bicarbonate  spironolactone    Antimicrobials last 90 days per EMR: MEDICATIONS  (STANDING):        Social History--  EtOH: denies   Tobacco: former  Drug Use: denies     Family/Marital History--  Family history of heart disease    FH: atrial fibrillation          Review of Systems:  A >=10-point review of systems was obtained.   Review of systems otherwise negative except as previously noted.    Physical Exam--  Vital Signs: T(F): 97.5 (02-04-25 @ 16:15), Max: 98.1 (02-03-25 @ 21:11)  HR: 82 (02-04-25 @ 16:15)  BP: 173/77 (02-04-25 @ 16:15)  RR: 18 (02-04-25 @ 16:15)  SpO2: 95% (02-04-25 @ 16:15)  Wt(kg): --  General: Nontoxic-appearing Male in no acute distress.  HEENT: AT/NC. Anicteric. Conjunctiva pink and moist. Oropharynx clear.   Neck: Not rigid. No sense of mass.  Nodes: None palpable.  Lungs: Flory BS B  Heart: Irreg irreg  Abdomen: Bowel sounds present and normoactive. Soft. Nondistended. Nontender.  Back: Unable  Extremities: No cyanosis or clubbing. 1+ LE edema. R groin w small hematoma. No cellulitis evident. Mild venous stasis changes.  Sensory diminished, known neuropathy.  Skin: Warm. Dry. Good turgor. No rash. No vasculitic stigmata.  Psychiatric: Appropriate affect and mood for situation        Laboratory & Imaging Data--  CBC                        14.6   9.00  )-----------( 276      ( 04 Feb 2025 06:09 )             44.4       Chemistries  02-04    133[L]  |  100  |  52[H]  ----------------------------<  184[H]  4.4   |  18[L]  |  1.98[H]    Ca    9.6      04 Feb 2025 06:09  Phos  3.3     02-03  Mg     2.2     02-03    RVP+ Influenza A & RSV      Culture Data  None    CXR (personally reviewed) < from: Xray Chest 2 Views PA/Lat (01.31.25 @ 16:39) >  FINDINGS:  Coronary calcifications  The heart is normal in size.  The lungs are clear.  There is no pneumothorax or pleural effusion.  No acute bony abnormality.    IMPRESSION:  Clearlungs.    --- End of Report ---    < end of copied text >    < from: CT Head No Cont (02.02.25 @ 19:25) >  IMPRESSION:    No evidence of acute intracranial abnormality.  No evidence of hemorrhage.    < end of copied text >         [facility]  Division of Infectious Diseases  [phone]    KRISTY COYLE  69y, Male  [id number]    HPI--  69M seen by me last month with concern of LE cellulitis; PMH HTN, CHF, CAD/stents, DM2, CRI, statin-related rhabdomyolysis, PAD, peripheral neuropathy now readmitted with worsening SOB. Patient treated with a brief course of antibiotics vs. cellulitis last admission, also acquired influenza while in the hospital. Here noted to be + for RSV. He is lying flat in bed on RA, s/p cardiac cath.       PMH/PSH--  Carotid Stenosis    Diabetes Mellitus    Neuropathy    Former smoker    CAD in native artery    Type 2 diabetes mellitus    Hyperlipidemia, unspecified hyperlipidemia type    Essential hypertension, hypertension with unspecified goal    Afib    Hematoma of leg    Stented coronary artery    CHF (congestive heart failure)    No Past Surgical History    s/p Carotid Endarterectomy        Allergies--  atorvastatin (Muscle Pain (Severe))      Medications--  Antibiotics:   Immunologic:   Other: aspirin  chewable  dextrose 5%.  dextrose 5%.  dextrose 50% Injectable  dextrose 50% Injectable  dextrose 50% Injectable  dextrose Oral Gel PRN  furosemide    Tablet  glucagon  Injectable  heparin   Injectable PRN  heparin   Injectable PRN  heparin  Infusion.  insulin glargine Injectable (LANTUS)  insulin lispro (ADMELOG) corrective regimen sliding scale  insulin lispro (ADMELOG) corrective regimen sliding scale  insulin lispro Injectable (ADMELOG)  isosorbide   dinitrate Tablet (ISORDIL)  metoprolol succinate ER  sodium bicarbonate  spironolactone    Antimicrobials last 90 days per EMR: MEDICATIONS  (STANDING):        Social History--  EtOH: denies   Tobacco: former  Drug Use: denies     Family/Marital History--  Family history of heart disease    FH: atrial fibrillation          Review of Systems:  A >=10-point review of systems was obtained.   Review of systems otherwise negative except as previously noted.    Physical Exam--  Vital Signs: T(F): 97.5 (02-04-25 @ 16:15), Max: 98.1 (02-03-25 @ 21:11)  HR: 82 (02-04-25 @ 16:15)  BP: 173/77 (02-04-25 @ 16:15)  RR: 18 (02-04-25 @ 16:15)  SpO2: 95% (02-04-25 @ 16:15)  Wt(kg): --  General: Nontoxic-appearing Male in no acute distress.  HEENT: AT/NC. Anicteric. Conjunctiva pink and moist. Oropharynx clear.   Neck: Not rigid. No sense of mass.  Nodes: None palpable.  Lungs: Dec BS B  Heart: Irreg irreg  Abdomen: Bowel sounds present and normoactive. Soft. Nondistended. Nontender.  Back: Unable  Extremities: No cyanosis or clubbing. 1+ LE edema. R groin w small hematoma. No cellulitis evident. Mild venous stasis changes.  Sensory diminished, known neuropathy.  Skin: Warm. Dry. Good turgor. No rash. No vasculitic stigmata.  Psychiatric: Appropriate affect and mood for situation        Laboratory & Imaging Data--  CBC                        14.6   9.00  )-----------( 276      ( 04 Feb 2025 06:09 )             44.4       Chemistries  02-04    133[L]  |  100  |  52[H]  ----------------------------<  184[H]  4.4   |  18[L]  |  1.98[H]    Ca    9.6      04 Feb 2025 06:09  Phos  3.3     02-03  Mg     2.2     02-03    RVP+ Influenza A & RSV      Culture Data  None    CXR (personally reviewed) < from: Xray Chest 2 Views PA/Lat (01.31.25 @ 16:39) >  FINDINGS:  Coronary calcifications  The heart is normal in size.  The lungs are clear.  There is no pneumothorax or pleural effusion.  No acute bony abnormality.    IMPRESSION:  Clearlungs.    --- End of Report ---    < end of copied text >    < from: CT Head No Cont (02.02.25 @ 19:25) >  IMPRESSION:    No evidence of acute intracranial abnormality.  No evidence of hemorrhage.    < end of copied text >

## 2025-02-04 NOTE — CONSULT NOTE ADULT - CONSULT REQUESTED DATE/TIME
02-Feb-2025 13:21
01-Feb-2025 07:34
01-Feb-2025 11:18
04-Feb-2025 16:30
03-Feb-2025 17:22
04-Feb-2025 09:03
03-Feb-2025 15:11

## 2025-02-04 NOTE — CONSULT NOTE ADULT - SUBJECTIVE AND OBJECTIVE BOX
PULMONARY CONSULT    HPI: 70 y/o M with PMH of CAD s/p stents, HFrEF, AFib, HTN, T2DM, CKD3 presenting to the hospital with exertional dyspnea. Patient was recently admitted with foot cellulitis, arterial insufficiency, CHF exacerbation and influenza. States he continued to have shortness of breath following hospital discharge, reportedly borderline hypoxic while at cardiologist's office who referred him back to the ED. RVP on admission remains + for Influenza A, also now + for RSV. CXR  grossly clear. Pulmonary called to consult for SOB, RSV infection.         PAST MEDICAL & SURGICAL HISTORY:  Former smoker  CAD in native artery  Type 2 diabetes mellitus  Hyperlipidemia, unspecified hyperlipidemia type  Essential hypertension, hypertension with unspecified goal  Afib  Hematoma of leg  Stented coronary artery  CHF (congestive heart failure)  s/p Carotid Endarterectomy      Allergies    atorvastatin (Muscle Pain (Severe))    Intolerances    FAMILY HISTORY:  Family history of heart disease  father  age 71    FH: atrial fibrillation  sister    Social history:     Review of Systems:  CONSTITUTIONAL: No fever, chills, or fatigue  EYES: No eye pain, visual disturbances, or discharge  ENMT:  No difficulty hearing, tinnitus, vertigo; No sinus or throat pain  NECK: No pain or stiffness  RESPIRATORY: Per above  CARDIOVASCULAR: No chest pain, palpitations, dizziness, or leg swelling  GASTROINTESTINAL: No abdominal or epigastric pain. No nausea, vomiting, or hematemesis; No diarrhea or constipation. No melena or hematochezia.  GENITOURINARY: No dysuria, frequency, hematuria, or incontinence  NEUROLOGICAL: No headaches, memory loss, loss of strength, numbness, or tremors  SKIN: No itching, burning, rashes, or lesions   MUSCULOSKELETAL: No joint pain or swelling; No muscle, back, or extremity pain  PSYCHIATRIC: No depression, anxiety, mood swings, or difficulty sleeping    Medications:  MEDICATIONS  (STANDING):  aspirin  chewable 81 milliGRAM(s) Oral daily  dextrose 5%. 1000 milliLiter(s) (100 mL/Hr) IV Continuous <Continuous>  dextrose 5%. 1000 milliLiter(s) (50 mL/Hr) IV Continuous <Continuous>  dextrose 50% Injectable 25 Gram(s) IV Push once  dextrose 50% Injectable 12.5 Gram(s) IV Push once  dextrose 50% Injectable 25 Gram(s) IV Push once  furosemide    Tablet 40 milliGRAM(s) Oral two times a day  glucagon  Injectable 1 milliGRAM(s) IntraMuscular once  heparin  Infusion.  Unit(s)/Hr (12 mL/Hr) IV Continuous <Continuous>  insulin glargine Injectable (LANTUS) 5 Unit(s) SubCutaneous at bedtime  insulin lispro (ADMELOG) corrective regimen sliding scale   SubCutaneous three times a day before meals  insulin lispro (ADMELOG) corrective regimen sliding scale   SubCutaneous at bedtime  insulin lispro Injectable (ADMELOG) 6 Unit(s) SubCutaneous three times a day before meals  isosorbide   dinitrate Tablet (ISORDIL) 10 milliGRAM(s) Oral three times a day  metoprolol succinate ER 75 milliGRAM(s) Oral daily  sodium bicarbonate 650 milliGRAM(s) Oral two times a day  spironolactone 50 milliGRAM(s) Oral daily    MEDICATIONS  (PRN):  dextrose Oral Gel 15 Gram(s) Oral once PRN Blood Glucose LESS THAN 70 milliGRAM(s)/deciliter  heparin   Injectable 5500 Unit(s) IV Push every 6 hours PRN For aPTT less than 40  heparin   Injectable 2500 Unit(s) IV Push every 6 hours PRN For aPTT between 40 - 57            Vital Signs Last 24 Hrs  T(C): 36.6 (2025 04:47), Max: 36.7 (2025 11:49)  T(F): 97.8 (2025 04:47), Max: 98.1 (2025 21:11)  HR: 72 (2025 04:47) (61 - 92)  BP: 142/93 (2025 04:47) (127/83 - 142/93)  BP(mean): --  RR: 18 (2025 04:47) (18 - 19)  SpO2: 97% (2025 04:47) (95% - 99%)    Parameters below as of 2025 04:47  Patient On (Oxygen Delivery Method): room air                 @ 07:01  -  - @ 07:00  --------------------------------------------------------  IN: 910 mL / OUT: 2550 mL / NET: -1640 mL          LABS:                        14.6   9.00  )-----------( 276      ( 2025 06:09 )             44.4     02-04    133[L]  |  100  |  52[H]  ----------------------------<  184[H]  4.4   |  18[L]  |  1.98[H]    Ca    9.6      2025 06:09  Phos  3.3     02-03  Mg     2.2     02-03            CAPILLARY BLOOD GLUCOSE      POCT Blood Glucose.: 193 mg/dL (2025 08:18)    PTT - ( 2025 06:10 )  PTT:96.8 sec  Urinalysis Basic - ( 2025 06:09 )    Color: x / Appearance: x / SG: x / pH: x  Gluc: 184 mg/dL / Ketone: x  / Bili: x / Urobili: x   Blood: x / Protein: x / Nitrite: x   Leuk Esterase: x / RBC: x / WBC x   Sq Epi: x / Non Sq Epi: x / Bacteria: x      Physical Examination:    General: No acute distress.      HEENT: Pupils equal, reactive to light.  Symmetric.    PULM: Clear to auscultation bilaterally, no significant sputum production    CVS: S1, S2    ABD: Soft, nondistended, nontender, normoactive bowel sounds, no masses    EXT: No edema, nontender    SKIN: Warm and well perfused, no rashes noted.    NEURO: Alert, oriented, interactive, nonfocal    RADIOLOGY REVIEWED  CXR:  grossly clear    PULMONARY CONSULT    HPI: 68 y/o M with PMH of CAD s/p stents, HFrEF, AFib, HTN, T2DM, CKD3 presenting to the hospital with exertional dyspnea. Patient was recently admitted with foot cellulitis, arterial insufficiency, CHF exacerbation and influenza. States he continued to have shortness of breath following hospital discharge, reportedly borderline hypoxic while at cardiologist's office who referred him back to the ED. RVP on admission remains + for Influenza A, also now + for RSV. CXR  grossly clear. Pulmonary called to consult for SOB, RSV infection. Pt reports smoking as a teenager but no significant smoking hx. Denies hx of lung disease, inhaler use. At this time pt reports SOB resolved, feels back to baseline. Cough at times with minimal sputum production. Denies CP, fever, chills. O2 sats 97% RA         PAST MEDICAL & SURGICAL HISTORY:  Former smoker  CAD in native artery  Type 2 diabetes mellitus  Hyperlipidemia, unspecified hyperlipidemia type  Essential hypertension, hypertension with unspecified goal  Afib  Hematoma of leg  Stented coronary artery  CHF (congestive heart failure)  s/p Carotid Endarterectomy      Allergies    atorvastatin (Muscle Pain (Severe))    Intolerances    FAMILY HISTORY:  Family history of heart disease  father  age 71    FH: atrial fibrillation  sister    Social history: no significant smoking hx, smoked as a teenager for few years     Review of Systems:  CONSTITUTIONAL: No fever, chills, or fatigue  EYES: No eye pain, visual disturbances, or discharge  ENMT:  No difficulty hearing, tinnitus, vertigo; No sinus or throat pain  NECK: No pain or stiffness  RESPIRATORY: Per above  CARDIOVASCULAR: No chest pain, palpitations, dizziness, or leg swelling  GASTROINTESTINAL: No abdominal or epigastric pain. No nausea, vomiting, or hematemesis; No diarrhea or constipation. No melena or hematochezia.  GENITOURINARY: No dysuria, frequency, hematuria, or incontinence  NEUROLOGICAL: No headaches, memory loss, loss of strength, numbness, or tremors  SKIN: No itching, burning, rashes, or lesions   MUSCULOSKELETAL: No joint pain or swelling; No muscle, back, or extremity pain  PSYCHIATRIC: No depression, anxiety, mood swings, or difficulty sleeping    Medications:  MEDICATIONS  (STANDING):  aspirin  chewable 81 milliGRAM(s) Oral daily  dextrose 5%. 1000 milliLiter(s) (100 mL/Hr) IV Continuous <Continuous>  dextrose 5%. 1000 milliLiter(s) (50 mL/Hr) IV Continuous <Continuous>  dextrose 50% Injectable 25 Gram(s) IV Push once  dextrose 50% Injectable 12.5 Gram(s) IV Push once  dextrose 50% Injectable 25 Gram(s) IV Push once  furosemide    Tablet 40 milliGRAM(s) Oral two times a day  glucagon  Injectable 1 milliGRAM(s) IntraMuscular once  heparin  Infusion.  Unit(s)/Hr (12 mL/Hr) IV Continuous <Continuous>  insulin glargine Injectable (LANTUS) 5 Unit(s) SubCutaneous at bedtime  insulin lispro (ADMELOG) corrective regimen sliding scale   SubCutaneous three times a day before meals  insulin lispro (ADMELOG) corrective regimen sliding scale   SubCutaneous at bedtime  insulin lispro Injectable (ADMELOG) 6 Unit(s) SubCutaneous three times a day before meals  isosorbide   dinitrate Tablet (ISORDIL) 10 milliGRAM(s) Oral three times a day  metoprolol succinate ER 75 milliGRAM(s) Oral daily  sodium bicarbonate 650 milliGRAM(s) Oral two times a day  spironolactone 50 milliGRAM(s) Oral daily    MEDICATIONS  (PRN):  dextrose Oral Gel 15 Gram(s) Oral once PRN Blood Glucose LESS THAN 70 milliGRAM(s)/deciliter  heparin   Injectable 5500 Unit(s) IV Push every 6 hours PRN For aPTT less than 40  heparin   Injectable 2500 Unit(s) IV Push every 6 hours PRN For aPTT between 40 - 57            Vital Signs Last 24 Hrs  T(C): 36.6 (2025 04:47), Max: 36.7 (2025 11:49)  T(F): 97.8 (2025 04:47), Max: 98.1 (2025 21:11)  HR: 72 (2025 04:47) (61 - 92)  BP: 142/93 (2025 04:47) (127/83 - 142/93)  BP(mean): --  RR: 18 (2025 04:47) (18 - 19)  SpO2: 97% (2025 04:47) (95% - 99%)    Parameters below as of 2025 04:47  Patient On (Oxygen Delivery Method): room air                02-03 @ 07:01  -  - @ 07:00  --------------------------------------------------------  IN: 910 mL / OUT: 2550 mL / NET: -1640 mL          LABS:                        14.6   9.00  )-----------( 276      ( 2025 06:09 )             44.4     02-04    133[L]  |  100  |  52[H]  ----------------------------<  184[H]  4.4   |  18[L]  |  1.98[H]    Ca    9.6      2025 06:09  Phos  3.3     02-03  Mg     2.2     02-03            CAPILLARY BLOOD GLUCOSE      POCT Blood Glucose.: 193 mg/dL (2025 08:18)    PTT - ( 2025 06:10 )  PTT:96.8 sec  Urinalysis Basic - ( 2025 06:09 )    Color: x / Appearance: x / SG: x / pH: x  Gluc: 184 mg/dL / Ketone: x  / Bili: x / Urobili: x   Blood: x / Protein: x / Nitrite: x   Leuk Esterase: x / RBC: x / WBC x   Sq Epi: x / Non Sq Epi: x / Bacteria: x      Physical Examination:    General: No acute distress.      HEENT: Pupils equal, reactive to light.  Symmetric.    PULM: CTA b/l     CVS: S1, S2    ABD: Soft, nondistended, nontender, normoactive bowel sounds, no masses    EXT: No edema, nontender    SKIN: Warm and well perfused, no rashes noted.    NEURO: Alert, oriented, interactive, nonfocal    RADIOLOGY REVIEWED  CXR:  grossly clear

## 2025-02-04 NOTE — DISCHARGE NOTE PROVIDER - HOSPITAL COURSE
HPI:  69M w/ CAD s/p stents, HFrEF, AFib, HTN, T2DM, CKD3 presenting to the hospital with exertional dyspnea. Patient was recently admitted with foot cellulitis, arterial insufficiency, heart failure exacerbation and influenza. States he continued to have shortness of breath following hospital discharge and when he went to his cardiologist's office today, he couldn't walk more than a few steps without becoming winded and became borderline hypoxic, which prompted their office to refer him to the ED. He has had questionable adherence to his medications since discharge. Notes trouble walking around especially going up stairs. Has also had some cough and congestion since discharge. Denies chest pain, fever, chills, or other symptoms at present. Has no shortness of breath at rest. (31 Jan 2025 18:41)    Hospital Course:  This 69-year-old male with a history of CAD s/p multiple stents, HFrEF, atrial fibrillation, hypertension, hyperlipidemia, type 2 diabetes, CKD3, and former smoker, presented with worsening CHF and a near-syncopal episode one week after discharge for acute on chronic CHF and influenza. He was also positive for RSV.    His near syncope was evaluated with a negative CT head and negative orthostatics. He was treated for acute on chronic HFrEF with continued diuresis, transitioning from IV to oral medications. His shortness of breath, attributed to both fluid overload and RSV infection, improved with diuresis and resolved at rest. His oxygen saturation was monitored and supplemental oxygen provided as needed. He received supportive care for RSV, including Duoneb PRN. Due to the near-syncopal episode, his Xarelto was held and he was placed on a heparin gtt for VTE prophylaxis. Given his exertional dyspnea, recent respiratory infections (RSV and influenza), and complex cardiac history, both Pulmonology and Cardiology were consulted. Cardiology deferred to a known cardiologist (Dr. Julito Dominguez) on whether repeat cardiac catheterization was indicated..............    The patient also had a dorsal wound on his left foot with edema and erythema. Vascular surgery and Infectious Disease were consulted for evaluation and management. A lower extremity exam revealed absent pedal pulses, diminished sensation, and cool temperature gradient bilaterally, consistent with his history of PAD. Podiatry recommended local wound care with betadine, gauze, and bill, deferring to Vascular surgery for further management. No acute surgical intervention was deemed necessary. His renal function, initially fluctuating likely due to hemodynamic instability, stabilized. Worsening acidosis, initially managed by holding sodium bicarbonate, later improved with its resumption.    His diabetes was managed with a continuation of his home insulin regimen with ongoing fingerstick glucose monitoring. He received counseling on diet and exercise. He remained cardiovascularly stable throughout his admission. The patient’s condition improved and he was deemed stable for discharge with plans for outpatient follow-up with his primary care physician and specialists, including cardiology, vascular surgery, nephrology, and infectious disease.    Important Medication Changes and Reason:    Active or Pending Issues Requiring Follow-up:    Advanced Directives:   [X] Full code  [ ] DNR  [ ] Hospice    Discharge Diagnoses:  Acute on chronic HFrEF (heart failure with reduced ejection fraction)  Afib  RSV (respiratory syncytial virus infection)   HPI:  69M w/ CAD s/p stents, HFrEF, AFib, HTN, T2DM, CKD3 presenting to the hospital with exertional dyspnea. Patient was recently admitted with foot cellulitis, arterial insufficiency, heart failure exacerbation and influenza. States he continued to have shortness of breath following hospital discharge and when he went to his cardiologist's office today, he couldn't walk more than a few steps without becoming winded and became borderline hypoxic, which prompted their office to refer him to the ED. He has had questionable adherence to his medications since discharge. Notes trouble walking around especially going up stairs. Has also had some cough and congestion since discharge. Denies chest pain, fever, chills, or other symptoms at present. Has no shortness of breath at rest. (31 Jan 2025 18:41)    Hospital Course:  This 69-year-old male with a history of CAD s/p multiple stents, HFrEF, atrial fibrillation, hypertension, hyperlipidemia, type 2 diabetes, CKD3, and former smoker, presented with worsening CHF and a near-syncopal episode one week after discharge for acute on chronic CHF and influenza. He was also positive for RSV.    His near syncope was evaluated with a negative CT head and negative orthostatics. He was treated for acute on chronic HFrEF with continued diuresis, transitioning from IV to oral medications. His shortness of breath, attributed to both fluid overload and RSV infection, improved with diuresis and resolved at rest. His oxygen saturation was monitored and supplemental oxygen provided as needed. He received supportive care for RSV, including Duoneb PRN. Due to the near-syncopal episode, his Xarelto was held and he was placed on a heparin gtt for VTE prophylaxis. Given his exertional dyspnea, recent respiratory infections (RSV and influenza), and complex cardiac history, both Pulmonology and Cardiology were consulted. Cardiology deferred to a known cardiologist (Dr. Julito Dominguez) on whether repeat cardiac catheterization was indicated..............    The patient also had a dorsal wound on his left foot with edema and erythema. Vascular surgery and Infectious Disease were consulted for evaluation and management. A lower extremity exam revealed absent pedal pulses, diminished sensation, and cool temperature gradient bilaterally, consistent with his history of PAD. Podiatry recommended local wound care with betadine, gauze, and bill, deferring to Vascular surgery for further management. No acute surgical intervention was deemed necessary. His renal function, initially fluctuating likely due to hemodynamic instability, stabilized. Worsening acidosis, initially managed by holding sodium bicarbonate, later improved with its resumption.    His diabetes was managed with a continuation of his home insulin regimen with ongoing fingerstick glucose monitoring. He received counseling on diet and exercise. He remained cardiovascularly stable throughout his admission. The patient’s condition improved and he was deemed stable for discharge with plans for outpatient follow-up with his primary care physician and specialists, including cardiology, vascular surgery, nephrology, and infectious disease.    Important Medication Changes and Reason:    Active or Pending Issues Requiring Follow-up:    Advanced Directives:   [X] Full code  [ ] DNR  [ ] Hospice    Discharge Diagnoses:  Acute on chronic HFrEF (heart failure with reduced ejection fraction)  Afib  RSV (respiratory syncytial virus infection)  BARRY HPI:  69M w/ CAD s/p stents, HFrEF, AFib, HTN, T2DM, CKD3 presenting to the hospital with exertional dyspnea. Patient was recently admitted with foot cellulitis, arterial insufficiency, heart failure exacerbation and influenza. States he continued to have shortness of breath following hospital discharge and when he went to his cardiologist's office today, he couldn't walk more than a few steps without becoming winded and became borderline hypoxic, which prompted their office to refer him to the ED. He has had questionable adherence to his medications since discharge. Notes trouble walking around especially going up stairs. Has also had some cough and congestion since discharge. Denies chest pain, fever, chills, or other symptoms at present. Has no shortness of breath at rest. (31 Jan 2025 18:41)    Hospital Course:  This 69-year-old male with a history of CAD s/p multiple stents, HFrEF, atrial fibrillation, hypertension, hyperlipidemia, type 2 diabetes, CKD3, and former smoker, presented with worsening CHF and a near-syncopal episode one week after discharge for acute on chronic CHF and influenza. He was also positive for RSV.    His near syncope was evaluated with a negative CT head and negative orthostatics. He was treated for acute on chronic HFrEF with continued diuresis, transitioning from IV to oral medications. His shortness of breath, attributed to both fluid overload and RSV infection, improved with diuresis and resolved at rest. His oxygen saturation was monitored and supplemental oxygen provided as needed. He received supportive care for RSV, including Duoneb PRN. Due to the near-syncopal episode, his Xarelto was held and he was placed on a heparin gtt for VTE prophylaxis. Given his exertional dyspnea, recent respiratory infections (RSV and influenza), and complex cardiac history, both Pulmonology and Cardiology were consulted.   S/P CATH 2/4: Occluded distal LAD.  Remainder of anatomy is unchanged from prior angiogram.  Medical therapy and maximize antianginals, CATH with no intervention  ,cont antianginals.    The patient also had a dorsal wound on his left foot with edema and erythema. Vascular surgery and Infectious Disease were consulted for evaluation and management. A lower extremity exam revealed absent pedal pulses, diminished sensation, and cool temperature gradient bilaterally, consistent with his history of PAD. Podiatry recommended local wound care with betadine, gauze, and bill, deferring to Vascular surgery for further management. No acute surgical intervention was deemed necessary. His renal function, initially fluctuating likely due to hemodynamic instability, stabilized. Worsening acidosis, initially managed by holding sodium bicarbonate, later improved with its resumption.    His diabetes was managed with a continuation of his home insulin regimen with ongoing fingerstick glucose monitoring. He received counseling on diet and exercise. He remained cardiovascularly stable throughout his admission. The patient’s condition improved and he was deemed stable for discharge with plans for outpatient follow-up with his primary care physician and specialists, including cardiology, vascular surgery, nephrology, and infectious disease.    Discharge/Dispo/Med rec discussed with attending Dr. Mcdaniel. Patient medically cleared for discharge home with outpatient follow up with PCP, ENDO, RENAL, CARDIO , PULM    Important Medication Changes and Reason:    Active or Pending Issues Requiring Follow-up:  Follow up with PCP, ENDO, RENAL, CARDIO , PULM     Advanced Directives:   [X] Full code  [ ] DNR  [ ] Hospice    Discharge Diagnoses:  Acute on chronic HFrEF (heart failure with reduced ejection fraction)  Afib  RSV (respiratory syncytial virus infection)  BARRY HPI:  69M w/ CAD s/p stents, HFrEF, AFib, HTN, T2DM, CKD3 presenting to the hospital with exertional dyspnea. Patient was recently admitted with foot cellulitis, arterial insufficiency, heart failure exacerbation and influenza. States he continued to have shortness of breath following hospital discharge and when he went to his cardiologist's office today, he couldn't walk more than a few steps without becoming winded and became borderline hypoxic, which prompted their office to refer him to the ED. He has had questionable adherence to his medications since discharge. Notes trouble walking around especially going up stairs. Has also had some cough and congestion since discharge. Denies chest pain, fever, chills, or other symptoms at present. Has no shortness of breath at rest. (2025 18:41)    Hospital Course:  This 69-year-old male with a history of CAD s/p multiple stents, HFrEF, atrial fibrillation, hypertension, hyperlipidemia, type 2 diabetes, CKD3, and former smoker, presented with worsening CHF and a near-syncopal episode one week after discharge for acute on chronic CHF and influenza. He was also positive for RSV.    His near syncope was evaluated with a negative CT head and negative orthostatics. He was treated for acute on chronic HFrEF with continued diuresis, transitioning from IV to oral medications. His shortness of breath, attributed to both fluid overload and RSV infection, improved with diuresis and resolved at rest. His oxygen saturation was monitored and supplemental oxygen provided as needed. He received supportive care for RSV, including Duoneb PRN. Due to the near-syncopal episode, his Xarelto was held and he was placed on a heparin gtt for VTE prophylaxis. Given his exertional dyspnea, recent respiratory infections (RSV and influenza), and complex cardiac history, both Pulmonology and Cardiology were consulted.   S/P CATH /: Occluded distal LAD.  Remainder of anatomy is unchanged from prior angiogram.  Medical therapy and maximize antianginals, CATH with no intervention  ,cont antianginals.    The patient also had a dorsal wound on his left foot with edema and erythema. Vascular surgery and Infectious Disease were consulted for evaluation and management. A lower extremity exam revealed absent pedal pulses, diminished sensation, and cool temperature gradient bilaterally, consistent with his history of PAD. Podiatry recommended local wound care with betadine, gauze, and bill, deferring to Vascular surgery for further management. No acute surgical intervention was deemed necessary. His renal function, initially fluctuating likely due to hemodynamic instability, stabilized. Worsening acidosis, initially managed by holding sodium bicarbonate, later improved with its resumption.    His diabetes was managed with a continuation of his home insulin regimen with ongoing fingerstick glucose monitoring. He received counseling on diet and exercise. He remained cardiovascularly stable throughout his admission. The patient’s condition improved and he was deemed stable for discharge with plans for outpatient follow-up with his primary care physician and specialists, including cardiology, vascular surgery, nephrology, and infectious disease.    Discharge/Dispo/Med rec discussed with attending Dr. Mcdaniel. Patient medically cleared for discharge home with outpatient follow up with PCP, ENDO, RENAL, CARDIO , PULM    Important Medication Changes and Reason:  see medication reconciliation    Active or Pending Issues Requiring Follow-up:  Follow up with PCP, ENDO, RENAL, CARDIO , PULM    Weight - Discharge/Trend:  Weight in k.1 (25 @ 08:04)  Weight in k.2 (25 @ 08:47)  Weight in k.6 (25 @ 08:37)  Weight in k.2 (25 @ 08:26)      Documented EF: 40-45%    Guideline Directed Medical Therapy Prescribed/Reason why not prescribed:  B-Blocker: Toprol XL  ARNI/ACE-I/ARB: CKD3  MRA: Aldactone  SGLT2 inhibitor: Can be initiated with outpatient Cardiologist    Appointment scheduled within 7 days?  [ ] YES [ ] NO    Active or Pending Issues Requiring Follow-up:    Advanced Directives:   [ ] Full code  [ ] DNR  [ ] Hospice    Discharge Diagnoses:  (Please specify acuity, type of heart failure, and if there was renal involvement)      Advanced Directives:   [X] Full code  [ ] DNR  [ ] Hospice    Discharge Diagnoses:  Acute on chronic HFrEF (heart failure with reduced ejection fraction)  Afib  RSV (respiratory syncytial virus infection)  BARRY HPI:  69M w/ CAD s/p stents, HFrEF, AFib, HTN, T2DM, CKD3 presenting to the hospital with exertional dyspnea. Patient was recently admitted with foot cellulitis, arterial insufficiency, heart failure exacerbation and influenza. States he continued to have shortness of breath following hospital discharge and when he went to his cardiologist's office today, he couldn't walk more than a few steps without becoming winded and became borderline hypoxic, which prompted their office to refer him to the ED. He has had questionable adherence to his medications since discharge. Notes trouble walking around especially going up stairs. Has also had some cough and congestion since discharge. Denies chest pain, fever, chills, or other symptoms at present. Has no shortness of breath at rest. (2025 18:41)    Hospital Course:  This 69-year-old male with a history of CAD s/p multiple stents, HFrEF, atrial fibrillation, hypertension, hyperlipidemia, type 2 diabetes, CKD3, and former smoker, presented with worsening CHF and a near-syncopal episode one week after discharge for acute on chronic CHF and influenza. He was also positive for RSV.    His near syncope was evaluated with a negative CT head and negative orthostatics. He was treated for acute on chronic HFrEF with continued diuresis, transitioning from IV to oral medications. His shortness of breath, attributed to both fluid overload and RSV infection, improved with diuresis and resolved at rest. His oxygen saturation was monitored and supplemental oxygen provided as needed. He received supportive care for RSV, including Duoneb PRN. Due to the near-syncopal episode, his Xarelto was held and he was placed on a heparin gtt for VTE prophylaxis. Given his exertional dyspnea, recent respiratory infections (RSV and influenza), and complex cardiac history, both Pulmonology and Cardiology were consulted.   S/P CATH /: Occluded distal LAD.  Remainder of anatomy is unchanged from prior angiogram.  Medical therapy and maximize antianginals, CATH with no intervention  ,cont antianginals.    The patient also had a dorsal wound on his left foot with edema and erythema. Vascular surgery and Infectious Disease were consulted for evaluation and management. A lower extremity exam revealed absent pedal pulses, diminished sensation, and cool temperature gradient bilaterally, consistent with his history of PAD. Podiatry recommended local wound care with betadine, gauze, and bill, deferring to Vascular surgery for further management. No acute surgical intervention was deemed necessary. His renal function, initially fluctuating likely due to hemodynamic instability, stabilized. Worsening acidosis, initially managed by holding sodium bicarbonate, later improved with its resumption.    His diabetes was managed with a continuation of his home insulin regimen with ongoing fingerstick glucose monitoring. He received counseling on diet and exercise. He remained cardiovascularly stable throughout his admission. The patient’s condition improved and he was deemed stable for discharge with plans for outpatient follow-up with his primary care physician and specialists, including cardiology, vascular surgery, nephrology, and infectious disease.    Discharge/Dispo/Med rec discussed with attending Dr. Mcdaniel. Patient medically cleared for discharge home with outpatient follow up with PCP, ENDO, RENAL, CARDIO , PULM    Important Medication Changes and Reason:  see medication reconciliation    Active or Pending Issues Requiring Follow-up:  Follow up with PCP, ENDO, RENAL, CARDIO , PULM    Weight - Discharge/Trend:  Weight in k.1 (25 @ 08:04)  Weight in k.2 (25 @ 08:47)  Weight in k.6 (25 @ 08:37)  Weight in k.2 (25 @ 08:26)      Documented EF: 40-45%    Guideline Directed Medical Therapy Prescribed/Reason why not prescribed:  B-Blocker: Toprol XL  ARNI/ACE-I/ARB: CKD3  MRA: Aldactone  SGLT2 inhibitor: Can be initiated with outpatient Cardiologist    Appointment scheduled within 7 days?  [X] YES [ ] NO    Advanced Directives:   [X] Full code  [ ] DNR  [ ] Hospice    Discharge Diagnoses:  Acute on chronic HFrEF (heart failure with reduced ejection fraction)  Afib  RSV (respiratory syncytial virus infection)  BARRY on CKD3

## 2025-02-04 NOTE — PROGRESS NOTE ADULT - SUBJECTIVE AND OBJECTIVE BOX
SUBJECTIVE: In isolation due to RSV. Feels improved at rest, still dyspnea when ambulating.   	  MEDICATIONS:  furosemide    Tablet 40 milliGRAM(s) Oral two times a day  isosorbide   dinitrate Tablet (ISORDIL) 10 milliGRAM(s) Oral three times a day  metoprolol succinate ER 75 milliGRAM(s) Oral daily  spironolactone 50 milliGRAM(s) Oral daily  dextrose 50% Injectable 25 Gram(s) IV Push once  dextrose 50% Injectable 12.5 Gram(s) IV Push once  dextrose 50% Injectable 25 Gram(s) IV Push once  dextrose Oral Gel 15 Gram(s) Oral once PRN  glucagon  Injectable 1 milliGRAM(s) IntraMuscular once  insulin glargine Injectable (LANTUS) 5 Unit(s) SubCutaneous at bedtime  insulin lispro (ADMELOG) corrective regimen sliding scale   SubCutaneous three times a day before meals  insulin lispro (ADMELOG) corrective regimen sliding scale   SubCutaneous at bedtime  insulin lispro Injectable (ADMELOG) 6 Unit(s) SubCutaneous three times a day before meals  aspirin  chewable 81 milliGRAM(s) Oral daily  dextrose 5%. 1000 milliLiter(s) IV Continuous <Continuous>  dextrose 5%. 1000 milliLiter(s) IV Continuous <Continuous>  heparin   Injectable 5500 Unit(s) IV Push every 6 hours PRN  heparin   Injectable 2500 Unit(s) IV Push every 6 hours PRN  heparin  Infusion.  Unit(s)/Hr IV Continuous <Continuous>  sodium bicarbonate 650 milliGRAM(s) Oral two times a day      REVIEW OF SYSTEMS:    CONSTITUTIONAL: No fever, weight loss, or fatigue  EYES: No eye pain, visual disturbances, or discharge  NECK: No pain or stiffness  RESPIRATORY: No cough, wheezing, chills or hemoptysis; No Shortness of Breath  CARDIOVASCULAR: No chest pain, palpitations, dizziness, or leg swelling  GASTROINTESTINAL: No abdominal or epigastric pain. No nausea, vomiting, or hematemesis; No diarrhea or constipation. No melena or hematochezia.  GENITOURINARY: No dysuria, frequency, hematuria, or incontinence  NEUROLOGICAL: No headaches, memory loss, loss of strength, numbness, or tremors  SKIN: No itching, burning, rashes, or lesions   LYMPH Nodes: No enlarged glands  MUSCULOSKELETAL: No joint pain or swelling; No muscle, back, or extremity pain  All other review of systems are negative.  	    PHYSICAL EXAM:  T(C): 36.6 (02-04-25 @ 04:47), Max: 36.7 (02-03-25 @ 11:49)  HR: 72 (02-04-25 @ 04:47) (61 - 92)  BP: 142/93 (02-04-25 @ 04:47) (127/83 - 142/93)  RR: 18 (02-04-25 @ 04:47) (18 - 19)  SpO2: 97% (02-04-25 @ 04:47) (95% - 99%)  Wt(kg): --  I&O's Summary    03 Feb 2025 07:01  -  04 Feb 2025 07:00  --------------------------------------------------------  IN: 910 mL / OUT: 2550 mL / NET: -1640 mL    04 Feb 2025 07:01  -  04 Feb 2025 11:06  --------------------------------------------------------  IN: 0 mL / OUT: 650 mL / NET: -650 mL          PHYSICAL EXAM    Appearance: Normal	  HEENT:   Normal oral mucosa, PERRL, EOMI	  NECK: Soft and supple, No LAD, No JVD  Cardiovascular: Regular Rate and Rhythm, Normal S1 S2, No murmurs, No clicks, gallops or rubs  Respiratory: Lungs clear to auscultation	  Extremities: No clubbing, cyanosis or edema    LABS:	 	                          14.6   9.00  )-----------( 276      ( 04 Feb 2025 06:09 )             44.4     02-04    133[L]  |  100  |  52[H]  ----------------------------<  184[H]  4.4   |  18[L]  |  1.98[H]    Ca    9.6      04 Feb 2025 06:09  Phos  3.3     02-03  Mg     2.2     02-03

## 2025-02-05 LAB
ANION GAP SERPL CALC-SCNC: 12 MMOL/L — SIGNIFICANT CHANGE UP (ref 5–17)
APTT BLD: 77.4 SEC — HIGH (ref 24.5–35.6)
APTT BLD: 82 SEC — HIGH (ref 24.5–35.6)
BUN SERPL-MCNC: 55 MG/DL — HIGH (ref 7–23)
CALCIUM SERPL-MCNC: 10.3 MG/DL — SIGNIFICANT CHANGE UP (ref 8.4–10.5)
CHLORIDE SERPL-SCNC: 95 MMOL/L — LOW (ref 96–108)
CO2 SERPL-SCNC: 23 MMOL/L — SIGNIFICANT CHANGE UP (ref 22–31)
CREAT SERPL-MCNC: 2.32 MG/DL — HIGH (ref 0.5–1.3)
EGFR: 30 ML/MIN/1.73M2 — LOW
GLUCOSE BLDC GLUCOMTR-MCNC: 177 MG/DL — HIGH (ref 70–99)
GLUCOSE BLDC GLUCOMTR-MCNC: 206 MG/DL — HIGH (ref 70–99)
GLUCOSE BLDC GLUCOMTR-MCNC: 232 MG/DL — HIGH (ref 70–99)
GLUCOSE BLDC GLUCOMTR-MCNC: 296 MG/DL — HIGH (ref 70–99)
GLUCOSE SERPL-MCNC: 280 MG/DL — HIGH (ref 70–99)
HCT VFR BLD CALC: 44.1 % — SIGNIFICANT CHANGE UP (ref 39–50)
HGB BLD-MCNC: 14.5 G/DL — SIGNIFICANT CHANGE UP (ref 13–17)
MCHC RBC-ENTMCNC: 30 PG — SIGNIFICANT CHANGE UP (ref 27–34)
MCHC RBC-ENTMCNC: 32.9 G/DL — SIGNIFICANT CHANGE UP (ref 32–36)
MCV RBC AUTO: 91.3 FL — SIGNIFICANT CHANGE UP (ref 80–100)
NRBC # BLD: 0 /100 WBCS — SIGNIFICANT CHANGE UP (ref 0–0)
NRBC BLD-RTO: 0 /100 WBCS — SIGNIFICANT CHANGE UP (ref 0–0)
PLATELET # BLD AUTO: 283 K/UL — SIGNIFICANT CHANGE UP (ref 150–400)
POTASSIUM SERPL-MCNC: 4.9 MMOL/L — SIGNIFICANT CHANGE UP (ref 3.5–5.3)
POTASSIUM SERPL-SCNC: 4.9 MMOL/L — SIGNIFICANT CHANGE UP (ref 3.5–5.3)
RBC # BLD: 4.83 M/UL — SIGNIFICANT CHANGE UP (ref 4.2–5.8)
RBC # FLD: 14 % — SIGNIFICANT CHANGE UP (ref 10.3–14.5)
SODIUM SERPL-SCNC: 130 MMOL/L — LOW (ref 135–145)
WBC # BLD: 10.11 K/UL — SIGNIFICANT CHANGE UP (ref 3.8–10.5)
WBC # FLD AUTO: 10.11 K/UL — SIGNIFICANT CHANGE UP (ref 3.8–10.5)

## 2025-02-05 PROCEDURE — G0545: CPT

## 2025-02-05 PROCEDURE — 99232 SBSQ HOSP IP/OBS MODERATE 35: CPT

## 2025-02-05 RX ORDER — RIVAROXABAN 20 MG/1
15 TABLET, FILM COATED ORAL
Refills: 0 | Status: DISCONTINUED | OUTPATIENT
Start: 2025-02-05 | End: 2025-02-07

## 2025-02-05 RX ORDER — INSULIN GLARGINE-YFGN 100 [IU]/ML
10 INJECTION, SOLUTION SUBCUTANEOUS AT BEDTIME
Refills: 0 | Status: DISCONTINUED | OUTPATIENT
Start: 2025-02-05 | End: 2025-02-06

## 2025-02-05 RX ADMIN — SODIUM BICARBONATE 650 MILLIGRAM(S): 42 INJECTION, SOLUTION INTRAVENOUS at 17:25

## 2025-02-05 RX ADMIN — ISOSORBIDE DINITRATE 10 MILLIGRAM(S): 40 TABLET ORAL at 17:30

## 2025-02-05 RX ADMIN — Medication 40 MILLIGRAM(S): at 05:09

## 2025-02-05 RX ADMIN — Medication 3: at 17:26

## 2025-02-05 RX ADMIN — Medication 800 UNIT(S)/HR: at 06:30

## 2025-02-05 RX ADMIN — RIVAROXABAN 15 MILLIGRAM(S): 20 TABLET, FILM COATED ORAL at 17:25

## 2025-02-05 RX ADMIN — Medication 2: at 12:01

## 2025-02-05 RX ADMIN — Medication 800 UNIT(S)/HR: at 14:58

## 2025-02-05 RX ADMIN — Medication 6 UNIT(S): at 17:25

## 2025-02-05 RX ADMIN — Medication 2: at 08:23

## 2025-02-05 RX ADMIN — Medication 6 UNIT(S): at 12:01

## 2025-02-05 RX ADMIN — Medication 6 UNIT(S): at 08:23

## 2025-02-05 RX ADMIN — ASPIRIN 81 MILLIGRAM(S): 81 TABLET, COATED ORAL at 12:02

## 2025-02-05 RX ADMIN — Medication 50 MILLIGRAM(S): at 05:09

## 2025-02-05 RX ADMIN — SODIUM BICARBONATE 650 MILLIGRAM(S): 42 INJECTION, SOLUTION INTRAVENOUS at 05:09

## 2025-02-05 RX ADMIN — INSULIN GLARGINE-YFGN 10 UNIT(S): 100 INJECTION, SOLUTION SUBCUTANEOUS at 22:02

## 2025-02-05 RX ADMIN — Medication 75 MILLIGRAM(S): at 05:15

## 2025-02-05 RX ADMIN — ISOSORBIDE DINITRATE 10 MILLIGRAM(S): 40 TABLET ORAL at 12:02

## 2025-02-05 RX ADMIN — ISOSORBIDE DINITRATE 10 MILLIGRAM(S): 40 TABLET ORAL at 05:09

## 2025-02-05 NOTE — PROGRESS NOTE ADULT - SUBJECTIVE AND OBJECTIVE BOX
No dyspnea on RA  Cath - total distal LAD with diffuse severe disease non amenable to PCI or CABG  /74  HR 62  Rare crackles  Irregular rhythm 1/6 systolic murmur  tr-1+ edema    BUN 55  Crt 2.32    Imp:  Remains mildly fluid overloaded.  The bump in Crt is due to the contrast dye  No intervenable CAD  Rec:  DC IV Heparin   Resume Eliquis  Repeat BMP in AM   If Crt has peaked and trending downward would resume Lasix 40 mg PO qd

## 2025-02-05 NOTE — PROGRESS NOTE ADULT - ASSESSMENT
68 y/o male with hx of CAD s/p multiple  stents, HFrEF (EF~50% as of 11/26/23), A-fib, HTN, HLD, T2DM, CKD3, former smoke discharged last week after being admitted for acute on chronic chf and influenza now admitted w worsening chf and near syncope       near syncope : ct neg  check orthostatics : neg     RSV : monitor O2   no wheezing on exam   supportive care   doubt contributing to his sob      Acute on chronic HFrEF (heart failure with reduced ejection fraction).   cont diuresis : changed to po  non compliant with meds and diet ( had salt containing snacks at bedside )   d/w wife       CAD (coronary artery disease).   cont cardiac meds   fu cards   unclear if his exertional sob is  sec to RSV and recent influenza ..pt is not wheezing.. anginal equivalent ??  d/w pul : not likley contributing   d/w Dr. Walton and Brandon:  underwent cath noted   cont medical management       BARRY on CKD: mild increase   monitor off lasix  if Cr stable / downtrending in AM .. will restart diuretics and dc home       ·  Problem: Chronic atrial fibrillation.   restart xarelto       ·  Problem: History of peripheral vascular disease.   off trental   d/w vasc:   aspirin     ·  Problem: Stage 3 chronic kidney disease.   ·  Plan: Creatinine at baseline  Dose meds for CKD3b  Avoid nephrotoxins.     Diabetes type 2.   monitor FS

## 2025-02-05 NOTE — PROGRESS NOTE ADULT - ASSESSMENT
dz  RVP+  Influenza A- suspect that this is residua from recent infection, not active/new infection  RSV- while test is positive and merits precautions per protocol, not hypoxic on RA, no respiratory complaints (no cough etc., just CLEARY), lung fields without rales, wheezing, or rhonchi, CXR unchanged compared with prior.  Treatment for RSV is supportive  I see no role for antibiotics  CLEARY suspect primarily on cardiac basis given known CAD    02/04: Doing well. no concern of uncontrolled infection on the basis of exam. I see no role for antimictrobials at this time.    Suggestions--  Limit contact and droplet precautions to 5 days duration.  Defer antimicrobials  No ID objection to outpatient management.    D/W patient. All questions answered to the best of my ability.   Left message for Dr. Violeta Ho MD  Attending Physician  [facility]  Division of Infectious Diseases  [phone]

## 2025-02-05 NOTE — PROGRESS NOTE ADULT - SUBJECTIVE AND OBJECTIVE BOX
Follow-up Pulm Progress Note    CLEARY improving  denies SOB at rest  denies CP     Medications:  MEDICATIONS  (STANDING):  aspirin  chewable 81 milliGRAM(s) Oral daily  dextrose 5%. 1000 milliLiter(s) (100 mL/Hr) IV Continuous <Continuous>  dextrose 5%. 1000 milliLiter(s) (50 mL/Hr) IV Continuous <Continuous>  dextrose 50% Injectable 25 Gram(s) IV Push once  dextrose 50% Injectable 12.5 Gram(s) IV Push once  dextrose 50% Injectable 25 Gram(s) IV Push once  glucagon  Injectable 1 milliGRAM(s) IntraMuscular once  heparin  Infusion. 800 Unit(s)/Hr (8 mL/Hr) IV Continuous <Continuous>  insulin glargine Injectable (LANTUS) 10 Unit(s) SubCutaneous at bedtime  insulin lispro (ADMELOG) corrective regimen sliding scale   SubCutaneous three times a day before meals  insulin lispro (ADMELOG) corrective regimen sliding scale   SubCutaneous at bedtime  insulin lispro Injectable (ADMELOG) 6 Unit(s) SubCutaneous three times a day before meals  isosorbide   dinitrate Tablet (ISORDIL) 10 milliGRAM(s) Oral three times a day  metoprolol succinate ER 75 milliGRAM(s) Oral daily  sodium bicarbonate 650 milliGRAM(s) Oral two times a day  spironolactone 50 milliGRAM(s) Oral daily    MEDICATIONS  (PRN):  dextrose Oral Gel 15 Gram(s) Oral once PRN Blood Glucose LESS THAN 70 milliGRAM(s)/deciliter  heparin   Injectable 5500 Unit(s) IV Push every 6 hours PRN For aPTT less than 40  heparin   Injectable 2500 Unit(s) IV Push every 6 hours PRN For aPTT between 40 - 57          Vital Signs Last 24 Hrs  T(C): 36.3 (05 Feb 2025 08:37), Max: 36.4 (04 Feb 2025 12:31)  T(F): 97.4 (05 Feb 2025 08:37), Max: 97.6 (04 Feb 2025 17:15)  HR: 60 (05 Feb 2025 08:37) (60 - 91)  BP: 169/82 (05 Feb 2025 08:37) (138/89 - 173/77)  BP(mean): --  RR: 18 (05 Feb 2025 08:37) (18 - 18)  SpO2: 98% (05 Feb 2025 08:37) (95% - 99%)    Parameters below as of 05 Feb 2025 08:37  Patient On (Oxygen Delivery Method): room air              02-04 @ 07:01  -  02-05 @ 07:00  --------------------------------------------------------  IN: 1272 mL / OUT: 2800 mL / NET: -1528 mL          LABS:                        14.5   10.11 )-----------( 283      ( 05 Feb 2025 06:22 )             44.1     02-05    130[L]  |  95[L]  |  55[H]  ----------------------------<  280[H]  4.9   |  23  |  2.32[H]    Ca    10.3      05 Feb 2025 06:20            CAPILLARY BLOOD GLUCOSE      POCT Blood Glucose.: 206 mg/dL (05 Feb 2025 08:06)    PTT - ( 05 Feb 2025 06:22 )  PTT:82.0 sec  Urinalysis Basic - ( 05 Feb 2025 06:20 )    Color: x / Appearance: x / SG: x / pH: x  Gluc: 280 mg/dL / Ketone: x  / Bili: x / Urobili: x   Blood: x / Protein: x / Nitrite: x   Leuk Esterase: x / RBC: x / WBC x   Sq Epi: x / Non Sq Epi: x / Bacteria: x                  Physical Examination:  PULM: Clear to auscultation bilaterally, no significant sputum production  CVS: S1, S2 heard      RADIOLOGY REVIEWED  CXR: 1/31 grossly clear

## 2025-02-05 NOTE — PROGRESS NOTE ADULT - SUBJECTIVE AND OBJECTIVE BOX
Date of service: 25 @ 22:41      Patient is a 69y old  Male who presents with a chief complaint of HF exacerbation (2025 12:03)                                                               INTERVAL HPI/OVERNIGHT EVENTS:    REVIEW OF SYSTEMS:     CONSTITUTIONAL: No weakness, fevers or chills  EYES/ENT: No visual changes , no ear ache   NECK: No pain or stiffness  RESPIRATORY: No cough, wheezing,  No shortness of breath  CARDIOVASCULAR: No chest pain or palpitations  GASTROINTESTINAL: No abdominal pain  . No nausea, vomiting, or hematemesis; No diarrhea or constipation. No melena or hematochezia.  GENITOURINARY: No dysuria, frequency or hematuria  NEUROLOGICAL: No numbness or weakness  SKIN: No itching, burning, rashes, or lesions                                                                                                                                                                                                                                                                                 Medications:  MEDICATIONS  (STANDING):  aspirin  chewable 81 milliGRAM(s) Oral daily  dextrose 5%. 1000 milliLiter(s) (50 mL/Hr) IV Continuous <Continuous>  dextrose 5%. 1000 milliLiter(s) (100 mL/Hr) IV Continuous <Continuous>  dextrose 50% Injectable 25 Gram(s) IV Push once  dextrose 50% Injectable 12.5 Gram(s) IV Push once  dextrose 50% Injectable 25 Gram(s) IV Push once  glucagon  Injectable 1 milliGRAM(s) IntraMuscular once  insulin glargine Injectable (LANTUS) 10 Unit(s) SubCutaneous at bedtime  insulin lispro (ADMELOG) corrective regimen sliding scale   SubCutaneous three times a day before meals  insulin lispro (ADMELOG) corrective regimen sliding scale   SubCutaneous at bedtime  insulin lispro Injectable (ADMELOG) 6 Unit(s) SubCutaneous three times a day before meals  isosorbide   dinitrate Tablet (ISORDIL) 10 milliGRAM(s) Oral three times a day  metoprolol succinate ER 75 milliGRAM(s) Oral daily  rivaroxaban 15 milliGRAM(s) Oral with dinner  sodium bicarbonate 650 milliGRAM(s) Oral two times a day  spironolactone 50 milliGRAM(s) Oral daily    MEDICATIONS  (PRN):  dextrose Oral Gel 15 Gram(s) Oral once PRN Blood Glucose LESS THAN 70 milliGRAM(s)/deciliter       Allergies    atorvastatin (Muscle Pain (Severe))    Intolerances      Vital Signs Last 24 Hrs  T(C): 36.7 (2025 21:00), Max: 36.7 (2025 21:00)  T(F): 98 (2025 21:00), Max: 98 (2025 21:00)  HR: 87 (:) (60 - 87)  BP: 166/83 (2025 21:00) (145/74 - 169/82)  BP(mean): --  RR: 18 (:) (18 - 18)  SpO2: 96% (:) (96% - 99%)    Parameters below as of   Patient On (Oxygen Delivery Method): room air      CAPILLARY BLOOD GLUCOSE      POCT Blood Glucose.: 177 mg/dL (2025 21:32)  POCT Blood Glucose.: 296 mg/dL (2025 16:34)  POCT Blood Glucose.: 232 mg/dL (2025 11:23)  POCT Blood Glucose.: 206 mg/dL (2025 08:06)       @ 07:  -  05 @ 07:00  --------------------------------------------------------  IN: 1272 mL / OUT: 2800 mL / NET: -1528 mL    05 @ 07:01  -  0205 @ 22:41  --------------------------------------------------------  IN: 480 mL / OUT: 1820 mL / NET: -1340 mL      Physical Exam:    Daily     Daily Weight in k.6 (2025 08:37)  General:  Well appearing, NAD, not cachetic  HEENT:  Nonicteric, PERRLA  CV:  RRR, S1S2   Lungs:  CTA B/L, no wheezes, rales, rhonchi  Abdomen:  Soft, non-tender, no distended, positive BS  Extremities: edema                                                                                                                                                                                                                                                                                       LABS:                               14.5   10.11 )-----------( 283      ( 2025 06:22 )             44.1                      02-05    130[L]  |  95[L]  |  55[H]  ----------------------------<  280[H]  4.9   |  23  |  2.32[H]    Ca    10.3      2025 06:20                         RADIOLOGY & ADDITIONAL TESTS         I personally reviewed: [  ]EKG   [  ]CXR    [  ] CT      A/P:         Discussed with :     Derek consultants' Notes   Time spent :

## 2025-02-05 NOTE — PROGRESS NOTE ADULT - ASSESSMENT
Assessment  DMT2: 69y Male with DM T2 with hyperglycemia, A1C 6.5, was on oral dapagliflozin and basal lantus 10u &  lispro 11u at home, blood sugars fluctuating and had hypoglycemia episode, resolved, sugars are running high. .  CAD: on medications, stable, monitored.  HTN: on antihypertensive medications, monitored, asymptomatic.  CKD: Monitor labs/BMP,       Myrna Rossi MD  Cell: [phone]  Office: [phone]

## 2025-02-05 NOTE — PROGRESS NOTE ADULT - SUBJECTIVE AND OBJECTIVE BOX
Chief complaint    Patient is a 69y old  Male who presents with a chief complaint of HF exacerbation (04 Feb 2025 23:38)   Review of systems  Patient appears comfortable.    Labs and Fingersticks  CAPILLARY BLOOD GLUCOSE      POCT Blood Glucose.: 206 mg/dL (05 Feb 2025 08:06)  POCT Blood Glucose.: 176 mg/dL (04 Feb 2025 21:52)  POCT Blood Glucose.: 157 mg/dL (04 Feb 2025 16:46)  POCT Blood Glucose.: 163 mg/dL (04 Feb 2025 12:11)      Anion Gap: 12 (02-05 @ 06:20)  Anion Gap: 15 (02-04 @ 06:09)      Calcium: 10.3 (02-05 @ 06:20)  Calcium: 9.6 (02-04 @ 06:09)          02-05    130[L]  |  95[L]  |  55[H]  ----------------------------<  280[H]  4.9   |  23  |  2.32[H]    Ca    10.3      05 Feb 2025 06:20                          14.5   10.11 )-----------( 283      ( 05 Feb 2025 06:22 )             44.1     Medications  MEDICATIONS  (STANDING):  aspirin  chewable 81 milliGRAM(s) Oral daily  dextrose 5%. 1000 milliLiter(s) (50 mL/Hr) IV Continuous <Continuous>  dextrose 5%. 1000 milliLiter(s) (100 mL/Hr) IV Continuous <Continuous>  dextrose 50% Injectable 25 Gram(s) IV Push once  dextrose 50% Injectable 12.5 Gram(s) IV Push once  dextrose 50% Injectable 25 Gram(s) IV Push once  furosemide    Tablet 40 milliGRAM(s) Oral two times a day  glucagon  Injectable 1 milliGRAM(s) IntraMuscular once  heparin  Infusion. 800 Unit(s)/Hr (8 mL/Hr) IV Continuous <Continuous>  insulin glargine Injectable (LANTUS) 10 Unit(s) SubCutaneous at bedtime  insulin lispro (ADMELOG) corrective regimen sliding scale   SubCutaneous three times a day before meals  insulin lispro (ADMELOG) corrective regimen sliding scale   SubCutaneous at bedtime  insulin lispro Injectable (ADMELOG) 6 Unit(s) SubCutaneous three times a day before meals  isosorbide   dinitrate Tablet (ISORDIL) 10 milliGRAM(s) Oral three times a day  metoprolol succinate ER 75 milliGRAM(s) Oral daily  sodium bicarbonate 650 milliGRAM(s) Oral two times a day  spironolactone 50 milliGRAM(s) Oral daily      Physical Exam  General: Patient appears comfortable.  Vital Signs Last 12 Hrs  T(F): 97.5 (02-05-25 @ 04:29), Max: 97.5 (02-05-25 @ 04:29)  HR: 71 (02-05-25 @ 04:29) (67 - 71)  BP: 167/94 (02-05-25 @ 04:29) (147/77 - 167/94)  BP(mean): --  RR: 18 (02-05-25 @ 04:29) (18 - 18)  SpO2: 99% (02-05-25 @ 04:29) (97% - 99%)  Neck: No palpable thyroid nodules.  CVS: S1S2, No murmurs  Respiratory: No wheezing, no crepitations  GI: Abdomen soft, non tender.    Diagnostics        Radiology:

## 2025-02-05 NOTE — PROGRESS NOTE ADULT - SUBJECTIVE AND OBJECTIVE BOX
[facility]  Division of Infectious Diseases  [phone]    Name: KRISTY COYLE  Age: 69y  Gender: Male  MRN: [id number]    Interval History--  Notes reviewed. Seen earlier today. "I feel great"  Comfortable on RA.   No pain. Denies fevers, chills, or rigors.   No SOB or cough.  Denies CP.    Past Medical History--  Carotid Stenosis    Diabetes Mellitus    Neuropathy    Former smoker    CAD in native artery    Type 2 diabetes mellitus    Hyperlipidemia, unspecified hyperlipidemia type    Essential hypertension, hypertension with unspecified goal    Afib    Hematoma of leg    Stented coronary artery    CHF (congestive heart failure)    No Past Surgical History    s/p Carotid Endarterectomy        For details regarding the patient's social history, family history, and other miscellaneous elements, please refer the initial infectious diseases consultation and/or the admitting history and physical examination for this admission.    Allergies    atorvastatin (Muscle Pain (Severe))    Intolerances        Medications--  Antibiotics:    Immunologic:    Other:  aspirin  chewable  dextrose 5%.  dextrose 5%.  dextrose 50% Injectable  dextrose 50% Injectable  dextrose 50% Injectable  dextrose Oral Gel PRN  furosemide    Tablet  glucagon  Injectable  heparin   Injectable PRN  heparin   Injectable PRN  heparin  Infusion.  insulin glargine Injectable (LANTUS)  insulin lispro (ADMELOG) corrective regimen sliding scale  insulin lispro (ADMELOG) corrective regimen sliding scale  insulin lispro Injectable (ADMELOG)  isosorbide   dinitrate Tablet (ISORDIL)  metoprolol succinate ER  sodium bicarbonate  spironolactone      Review of Systems--  A 10-point review of systems was obtained.   Review of systems otherwise negative except as previously noted.    Physical Examination--  Vital Signs: T(F): 97.4 (02-05-25 @ 08:37), Max: 97.6 (02-04-25 @ 17:15)  HR: 60 (02-05-25 @ 08:37)  BP: 169/82 (02-05-25 @ 08:37)  RR: 18 (02-05-25 @ 08:37)  SpO2: 98% (02-05-25 @ 08:37)  Wt(kg): --  General: Nontoxic-appearing Male in no acute distress.  HEENT: AT/NC. Anicteric. Conjunctiva pink and moist. Oropharynx clear.   Neck: Not rigid. No sense of mass.  Nodes: None palpable.  Lungs: Diminished BS B no RWR  Heart: Irreg irreg  Abdomen: Bowel sounds present and normoactive. Soft. Nondistended. Nontender.  Back: Unable  Extremities: No cyanosis or clubbing. 1+ LE edema.   Skin: Warm. Dry. Good turgor. No rash. No vasculitic stigmata.  Psychiatric: Appropriate affect and mood for situation      Laboratory Studies--  CBC                        14.5   10.11 )-----------( 283      ( 05 Feb 2025 06:22 )             44.1       Chemistries  02-05    130[L]  |  95[L]  |  55[H]  ----------------------------<  280[H]  4.9   |  23  |  2.32[H]    Ca    10.3      05 Feb 2025 06:20        Culture Data

## 2025-02-05 NOTE — PROGRESS NOTE ADULT - SUBJECTIVE AND OBJECTIVE BOX
Overnight events noted      VITAL:  T(C): , Max: 36.4 (02-04-25 @ 12:31)  T(F): , Max: 97.6 (02-04-25 @ 17:15)  HR: 71 (02-05-25 @ 04:29)  BP: 167/94 (02-05-25 @ 04:29)  BP(mean): --  RR: 18 (02-05-25 @ 04:29)  SpO2: 99% (02-05-25 @ 04:29)  Wt(kg): --      PHYSICAL EXAM:  Constitutional: NAD, Alert  HEENT: NCAT, DMM  Neck: Supple, No JVD  Respiratory: CTA-b/l  Cardiovascular: reg s1s2  Gastrointestinal: BS+, soft, NT/ND  Extremities: No peripheral edema b/l  Neurological: no focal deficits; strength grossly intact  Back: no CVAT b/l  Skin: No rashes, no nevi        LABS:                        14.5   10.11 )-----------( 283      ( 05 Feb 2025 06:22 )             44.1     Na(130)/K(4.9)/Cl(95)/HCO3(23)/BUN(55)/Cr(2.32)Glu(280)/Ca(10.3)/Mg(--)/PO4(--)    02-05 @ 06:20  Na(133)/K(4.4)/Cl(100)/HCO3(18)/BUN(52)/Cr(1.98)Glu(184)/Ca(9.6)/Mg(--)/PO4(--)    02-04 @ 06:09  Na(134)/K(4.8)/Cl(98)/HCO3(23)/BUN(50)/Cr(2.18)Glu(192)/Ca(9.9)/Mg(2.2)/PO4(3.3)    02-03 @ 06:47      IMPRESSION: 69M w/ HTN, DM2, AFib, CAD-10 stents, HFrEF, PAD, and CKD, 1/31/25 p/w SOB in association with RSV    (1)CKD - 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     (2)BARRY - ATN from contrast nephropathy - appears to be rather mild    (3)Lytes - acceptable    (4)Cardiac - s/p diagnostic cath 2/4    (5)ID - RSV - clinically improved      RECOMMEND:  (1)Diuretics/PO NaHCO3 as ordered  (2)BMP daily  (3)Meds for GFR 20-30ml/min  (4)No discharge until creatinine plateaus            Markell Walton MD  [facility]  Office/on call physician: [phone]  Cell (7a-7p): [phone]       Overnight events noted      VITAL:  T(C): , Max: 36.4 (02-04-25 @ 12:31)  T(F): , Max: 97.6 (02-04-25 @ 17:15)  HR: 71 (02-05-25 @ 04:29)  BP: 167/94 (02-05-25 @ 04:29)  BP(mean): --  RR: 18 (02-05-25 @ 04:29)  SpO2: 99% (02-05-25 @ 04:29)  Wt(kg): --      PHYSICAL EXAM:  Constitutional: NAD, Alert  HEENT: NCAT, DMM  Neck: Supple, No JVD  Respiratory: CTA-b/l  Cardiovascular: reg s1s2  Gastrointestinal: BS+, soft, NT/ND  Extremities: No peripheral edema b/l  Neurological: no focal deficits; strength grossly intact  Back: no CVAT b/l  Skin: No rashes, no nevi        LABS:                        14.5   10.11 )-----------( 283      ( 05 Feb 2025 06:22 )             44.1     Na(130)/K(4.9)/Cl(95)/HCO3(23)/BUN(55)/Cr(2.32)Glu(280)/Ca(10.3)/Mg(--)/PO4(--)    02-05 @ 06:20  Na(133)/K(4.4)/Cl(100)/HCO3(18)/BUN(52)/Cr(1.98)Glu(184)/Ca(9.6)/Mg(--)/PO4(--)    02-04 @ 06:09  Na(134)/K(4.8)/Cl(98)/HCO3(23)/BUN(50)/Cr(2.18)Glu(192)/Ca(9.9)/Mg(2.2)/PO4(3.3)    02-03 @ 06:47      IMPRESSION: 69M w/ HTN, DM2, AFib, CAD-10 stents, HFrEF, PAD, and CKD, 1/31/25 p/w SOB in association with RSV    (1)CKD - 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     (2)BARRY - ATN from contrast nephropathy - appears to be rather mild    (3)Lytes - acceptable    (4)Cardiac - s/p diagnostic cath 2/4    (5)ID - RSV - clinically improved      RECOMMEND:  (1)Diuretics/PO NaHCO3 as ordered  (2)BMP+Mg+PO4 daily  (3)Meds for GFR 20-30ml/min  (4)No discharge until creatinine plateaus            Markell Walton MD  [facility]  Office/on call physician: [phone]  Cell (7a-7p): [phone]       No pain, no sob      VITAL:  T(C): , Max: 36.4 (02-04-25 @ 12:31)  T(F): , Max: 97.6 (02-04-25 @ 17:15)  HR: 71 (02-05-25 @ 04:29)  BP: 167/94 (02-05-25 @ 04:29)  BP(mean): --  RR: 18 (02-05-25 @ 04:29)  SpO2: 99% (02-05-25 @ 04:29)  Wt(kg): --      PHYSICAL EXAM:  Constitutional: NAD, Alert  HEENT: NCAT, DMM  Neck: Supple, No JVD  Respiratory: CTA-b/l  Cardiovascular: reg s1s2  Gastrointestinal: BS+, soft, NT/ND  Extremities: No peripheral edema b/l  Neurological: no focal deficits; strength grossly intact  Back: no CVAT b/l  Skin: No rashes, no nevi        LABS:                        14.5   10.11 )-----------( 283      ( 05 Feb 2025 06:22 )             44.1     Na(130)/K(4.9)/Cl(95)/HCO3(23)/BUN(55)/Cr(2.32)Glu(280)/Ca(10.3)/Mg(--)/PO4(--)    02-05 @ 06:20  Na(133)/K(4.4)/Cl(100)/HCO3(18)/BUN(52)/Cr(1.98)Glu(184)/Ca(9.6)/Mg(--)/PO4(--)    02-04 @ 06:09  Na(134)/K(4.8)/Cl(98)/HCO3(23)/BUN(50)/Cr(2.18)Glu(192)/Ca(9.9)/Mg(2.2)/PO4(3.3)    02-03 @ 06:47      IMPRESSION: 69M w/ HTN, DM2, AFib, CAD-10 stents, HFrEF, PAD, and CKD, 1/31/25 p/w SOB in association with RSV    (1)CKD - 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     (2)BARRY - ATN from contrast nephropathy - appears to be rather mild    (3)Lytes - acceptable    (4)Cardiac - s/p diagnostic cath 2/4    (5)ID - RSV - clinically improved      RECOMMEND:  (1)Diuretics/PO NaHCO3 as ordered  (2)BMP+Mg+PO4 daily  (3)Meds for GFR 20-30ml/min  (4)No discharge until creatinine plateaus            Markell Walton MD  [facility]  Office/on call physician: [phone]  Cell (7a-7p): [phone]

## 2025-02-06 ENCOUNTER — TRANSCRIPTION ENCOUNTER (OUTPATIENT)
Age: 70
End: 2025-02-06

## 2025-02-06 LAB
ANION GAP SERPL CALC-SCNC: 11 MMOL/L — SIGNIFICANT CHANGE UP (ref 5–17)
ANION GAP SERPL CALC-SCNC: 14 MMOL/L — SIGNIFICANT CHANGE UP (ref 5–17)
APTT BLD: 43.8 SEC — HIGH (ref 24.5–35.6)
BUN SERPL-MCNC: 55 MG/DL — HIGH (ref 7–23)
BUN SERPL-MCNC: 58 MG/DL — HIGH (ref 7–23)
CALCIUM SERPL-MCNC: 10.1 MG/DL — SIGNIFICANT CHANGE UP (ref 8.4–10.5)
CALCIUM SERPL-MCNC: 10.5 MG/DL — SIGNIFICANT CHANGE UP (ref 8.4–10.5)
CHLORIDE SERPL-SCNC: 94 MMOL/L — LOW (ref 96–108)
CHLORIDE SERPL-SCNC: 95 MMOL/L — LOW (ref 96–108)
CO2 SERPL-SCNC: 25 MMOL/L — SIGNIFICANT CHANGE UP (ref 22–31)
CO2 SERPL-SCNC: 26 MMOL/L — SIGNIFICANT CHANGE UP (ref 22–31)
CREAT SERPL-MCNC: 2 MG/DL — HIGH (ref 0.5–1.3)
CREAT SERPL-MCNC: 2.09 MG/DL — HIGH (ref 0.5–1.3)
EGFR: 34 ML/MIN/1.73M2 — LOW
EGFR: 36 ML/MIN/1.73M2 — LOW
GLUCOSE BLDC GLUCOMTR-MCNC: 159 MG/DL — HIGH (ref 70–99)
GLUCOSE BLDC GLUCOMTR-MCNC: 168 MG/DL — HIGH (ref 70–99)
GLUCOSE BLDC GLUCOMTR-MCNC: 216 MG/DL — HIGH (ref 70–99)
GLUCOSE BLDC GLUCOMTR-MCNC: 235 MG/DL — HIGH (ref 70–99)
GLUCOSE SERPL-MCNC: 230 MG/DL — HIGH (ref 70–99)
GLUCOSE SERPL-MCNC: 322 MG/DL — HIGH (ref 70–99)
HCT VFR BLD CALC: 46.1 % — SIGNIFICANT CHANGE UP (ref 39–50)
HGB BLD-MCNC: 15.3 G/DL — SIGNIFICANT CHANGE UP (ref 13–17)
MAGNESIUM SERPL-MCNC: 2.6 MG/DL — SIGNIFICANT CHANGE UP (ref 1.6–2.6)
MCHC RBC-ENTMCNC: 30.6 PG — SIGNIFICANT CHANGE UP (ref 27–34)
MCHC RBC-ENTMCNC: 33.2 G/DL — SIGNIFICANT CHANGE UP (ref 32–36)
MCV RBC AUTO: 92.2 FL — SIGNIFICANT CHANGE UP (ref 80–100)
NRBC # BLD: 0 /100 WBCS — SIGNIFICANT CHANGE UP (ref 0–0)
NRBC BLD-RTO: 0 /100 WBCS — SIGNIFICANT CHANGE UP (ref 0–0)
PLATELET # BLD AUTO: 305 K/UL — SIGNIFICANT CHANGE UP (ref 150–400)
POTASSIUM SERPL-MCNC: 5.2 MMOL/L — SIGNIFICANT CHANGE UP (ref 3.5–5.3)
POTASSIUM SERPL-MCNC: 5.4 MMOL/L — HIGH (ref 3.5–5.3)
POTASSIUM SERPL-SCNC: 5.2 MMOL/L — SIGNIFICANT CHANGE UP (ref 3.5–5.3)
POTASSIUM SERPL-SCNC: 5.4 MMOL/L — HIGH (ref 3.5–5.3)
RBC # BLD: 5 M/UL — SIGNIFICANT CHANGE UP (ref 4.2–5.8)
RBC # FLD: 13.8 % — SIGNIFICANT CHANGE UP (ref 10.3–14.5)
SODIUM SERPL-SCNC: 132 MMOL/L — LOW (ref 135–145)
SODIUM SERPL-SCNC: 133 MMOL/L — LOW (ref 135–145)
WBC # BLD: 10.77 K/UL — HIGH (ref 3.8–10.5)
WBC # FLD AUTO: 10.77 K/UL — HIGH (ref 3.8–10.5)

## 2025-02-06 PROCEDURE — 93926 LOWER EXTREMITY STUDY: CPT | Mod: 26,RT

## 2025-02-06 PROCEDURE — G0545: CPT

## 2025-02-06 PROCEDURE — 99232 SBSQ HOSP IP/OBS MODERATE 35: CPT

## 2025-02-06 RX ORDER — ASPIRIN 81 MG/1
1 TABLET, COATED ORAL
Qty: 30 | Refills: 0
Start: 2025-02-06 | End: 2025-03-07

## 2025-02-06 RX ORDER — INSULIN GLARGINE-YFGN 100 [IU]/ML
15 INJECTION, SOLUTION SUBCUTANEOUS
Qty: 1 | Refills: 0
Start: 2025-02-06 | End: 2025-03-07

## 2025-02-06 RX ORDER — INSULIN GLARGINE-YFGN 100 [IU]/ML
15 INJECTION, SOLUTION SUBCUTANEOUS AT BEDTIME
Refills: 0 | Status: DISCONTINUED | OUTPATIENT
Start: 2025-02-06 | End: 2025-02-07

## 2025-02-06 RX ORDER — INSULIN LISPRO 100/ML
7 VIAL (ML) SUBCUTANEOUS
Qty: 1 | Refills: 0
Start: 2025-02-06 | End: 2025-03-07

## 2025-02-06 RX ORDER — INSULIN GLARGINE-YFGN 100 [IU]/ML
12 INJECTION, SOLUTION SUBCUTANEOUS AT BEDTIME
Refills: 0 | Status: DISCONTINUED | OUTPATIENT
Start: 2025-02-06 | End: 2025-02-06

## 2025-02-06 RX ORDER — INSULIN LISPRO 100/ML
7 VIAL (ML) SUBCUTANEOUS
Refills: 0 | Status: DISCONTINUED | OUTPATIENT
Start: 2025-02-06 | End: 2025-02-07

## 2025-02-06 RX ADMIN — SODIUM BICARBONATE 650 MILLIGRAM(S): 42 INJECTION, SOLUTION INTRAVENOUS at 05:04

## 2025-02-06 RX ADMIN — Medication 6 UNIT(S): at 13:25

## 2025-02-06 RX ADMIN — SODIUM BICARBONATE 650 MILLIGRAM(S): 42 INJECTION, SOLUTION INTRAVENOUS at 17:34

## 2025-02-06 RX ADMIN — Medication 7 UNIT(S): at 17:29

## 2025-02-06 RX ADMIN — Medication 2: at 08:47

## 2025-02-06 RX ADMIN — Medication 50 MILLIGRAM(S): at 05:04

## 2025-02-06 RX ADMIN — ISOSORBIDE DINITRATE 10 MILLIGRAM(S): 40 TABLET ORAL at 13:24

## 2025-02-06 RX ADMIN — Medication 6 UNIT(S): at 08:47

## 2025-02-06 RX ADMIN — Medication 40 MILLIGRAM(S): at 13:24

## 2025-02-06 RX ADMIN — Medication 200 MILLIGRAM(S): at 22:38

## 2025-02-06 RX ADMIN — Medication 1: at 17:29

## 2025-02-06 RX ADMIN — ASPIRIN 81 MILLIGRAM(S): 81 TABLET, COATED ORAL at 13:24

## 2025-02-06 RX ADMIN — INSULIN GLARGINE-YFGN 15 UNIT(S): 100 INJECTION, SOLUTION SUBCUTANEOUS at 21:50

## 2025-02-06 RX ADMIN — Medication 1: at 13:25

## 2025-02-06 RX ADMIN — Medication 75 MILLIGRAM(S): at 05:04

## 2025-02-06 RX ADMIN — RIVAROXABAN 15 MILLIGRAM(S): 20 TABLET, FILM COATED ORAL at 17:34

## 2025-02-06 RX ADMIN — ISOSORBIDE DINITRATE 10 MILLIGRAM(S): 40 TABLET ORAL at 05:03

## 2025-02-06 RX ADMIN — ISOSORBIDE DINITRATE 10 MILLIGRAM(S): 40 TABLET ORAL at 17:34

## 2025-02-06 NOTE — PROGRESS NOTE ADULT - SUBJECTIVE AND OBJECTIVE BOX
Date of service: 25 @ 13:44      Patient is a 69y old  Male who presents with a chief complaint of HF exacerbation (2025 13:07)                                                               INTERVAL HPI/OVERNIGHT EVENTS:    REVIEW OF SYSTEMS:     CONSTITUTIONAL: No weakness, fevers or chills  EYES/ENT: No visual changes , no ear ache   NECK: No pain or stiffness  RESPIRATORY: No cough, wheezing,  No shortness of breath  CARDIOVASCULAR: No chest pain or palpitations  GASTROINTESTINAL: No abdominal pain  . No nausea, vomiting, or hematemesis; No diarrhea or constipation. No melena or hematochezia.  GENITOURINARY: No dysuria, frequency or hematuria  NEUROLOGICAL: No numbness or weakness  SKIN: No itching, burning, rashes, or lesions                                                                                                                                                                                                                                                                                 Medications:  MEDICATIONS  (STANDING):  aspirin  chewable 81 milliGRAM(s) Oral daily  dextrose 5%. 1000 milliLiter(s) (100 mL/Hr) IV Continuous <Continuous>  dextrose 5%. 1000 milliLiter(s) (50 mL/Hr) IV Continuous <Continuous>  dextrose 50% Injectable 25 Gram(s) IV Push once  dextrose 50% Injectable 12.5 Gram(s) IV Push once  dextrose 50% Injectable 25 Gram(s) IV Push once  furosemide    Tablet 40 milliGRAM(s) Oral daily  glucagon  Injectable 1 milliGRAM(s) IntraMuscular once  insulin glargine Injectable (LANTUS) 12 Unit(s) SubCutaneous at bedtime  insulin lispro (ADMELOG) corrective regimen sliding scale   SubCutaneous three times a day before meals  insulin lispro (ADMELOG) corrective regimen sliding scale   SubCutaneous at bedtime  insulin lispro Injectable (ADMELOG) 6 Unit(s) SubCutaneous three times a day before meals  isosorbide   dinitrate Tablet (ISORDIL) 10 milliGRAM(s) Oral three times a day  metoprolol succinate ER 75 milliGRAM(s) Oral daily  rivaroxaban 15 milliGRAM(s) Oral with dinner  sodium bicarbonate 650 milliGRAM(s) Oral two times a day  spironolactone 50 milliGRAM(s) Oral daily    MEDICATIONS  (PRN):  dextrose Oral Gel 15 Gram(s) Oral once PRN Blood Glucose LESS THAN 70 milliGRAM(s)/deciliter       Allergies    atorvastatin (Muscle Pain (Severe))    Intolerances      Vital Signs Last 24 Hrs  T(C): 36.4 (2025 13:22), Max: 36.7 (2025 21:00)  T(F): 97.5 (:), Max: 98 (2025 21:00)  HR: 69 (:) (69 - 87)  BP: 166/88 (:) (148/84 - 167/84)  BP(mean): --  RR: 17 (:) (17 - 18)  SpO2: 97% () (96% - 98%)    Parameters below as of   Patient On (Oxygen Delivery Method): room air      CAPILLARY BLOOD GLUCOSE      POCT Blood Glucose.: 159 mg/dL (2025 13:18)  POCT Blood Glucose.: 216 mg/dL (2025 08:36)  POCT Blood Glucose.: 177 mg/dL (2025 21:32)  POCT Blood Glucose.: 296 mg/dL (2025 16:34)       @ 07:  -   @ 07:00  --------------------------------------------------------  IN: 480 mL / OUT: 1820 mL / NET: -1340 mL     @ 07: @ 13:44  --------------------------------------------------------  IN: 0 mL / OUT: 400 mL / NET: -400 mL      Physical Exam:    Daily     Daily Weight in k.2 (2025 08:47)  General:  Well appearing, NAD, not cachetic  HEENT:  Nonicteric, PERRLA  CV:  RRR, S1S2   Lungs:  CTA B/L, no wheezes, rales, rhonchi  Abdomen:  Soft, non-tender, no distended, positive BS  Extremities:  R groin ecchymosis   Neuro:  AAOx3, non-focal, grossly intact                                                                                                                                                                                                                                                                                                LABS:                               15.3   10.77 )-----------( 305      ( 2025 06:44 )             46.1                      02-    132[L]  |  95[L]  |  55[H]  ----------------------------<  322[H]  5.2   |  26  |  2.00[H]    Ca    10.1      2025 09:33  Mg     2.6     -06                         RADIOLOGY & ADDITIONAL TESTS         I personally reviewed: [  ]EKG   [  ]CXR    [  ] CT      A/P:         Discussed with :     Derek consultants' Notes   Time spent :

## 2025-02-06 NOTE — PROGRESS NOTE ADULT - SUBJECTIVE AND OBJECTIVE BOX
Chief complaint  Patient is a 69y old  Male who presents with a chief complaint of HF exacerbation (06 Feb 2025 13:44)         Labs and Fingersticks  CAPILLARY BLOOD GLUCOSE      POCT Blood Glucose.: 159 mg/dL (06 Feb 2025 13:18)  POCT Blood Glucose.: 216 mg/dL (06 Feb 2025 08:36)  POCT Blood Glucose.: 177 mg/dL (05 Feb 2025 21:32)  POCT Blood Glucose.: 296 mg/dL (05 Feb 2025 16:34)      Anion Gap: 11 (02-06 @ 09:33)  Anion Gap: 14 (02-06 @ 06:47)  Anion Gap: 12 (02-05 @ 06:20)      Calcium: 10.1 (02-06 @ 09:33)  Calcium: 10.5 (02-06 @ 06:47)  Calcium: 10.3 (02-05 @ 06:20)          02-06    132[L]  |  95[L]  |  55[H]  ----------------------------<  322[H]  5.2   |  26  |  2.00[H]    Ca    10.1      06 Feb 2025 09:33  Mg     2.6     02-06                          15.3   10.77 )-----------( 305      ( 06 Feb 2025 06:44 )             46.1     Medications  MEDICATIONS  (STANDING):  aspirin  chewable 81 milliGRAM(s) Oral daily  dextrose 5%. 1000 milliLiter(s) (50 mL/Hr) IV Continuous <Continuous>  dextrose 5%. 1000 milliLiter(s) (100 mL/Hr) IV Continuous <Continuous>  dextrose 50% Injectable 25 Gram(s) IV Push once  dextrose 50% Injectable 12.5 Gram(s) IV Push once  dextrose 50% Injectable 25 Gram(s) IV Push once  furosemide    Tablet 40 milliGRAM(s) Oral daily  glucagon  Injectable 1 milliGRAM(s) IntraMuscular once  insulin glargine Injectable (LANTUS) 12 Unit(s) SubCutaneous at bedtime  insulin lispro (ADMELOG) corrective regimen sliding scale   SubCutaneous three times a day before meals  insulin lispro (ADMELOG) corrective regimen sliding scale   SubCutaneous at bedtime  insulin lispro Injectable (ADMELOG) 6 Unit(s) SubCutaneous three times a day before meals  isosorbide   dinitrate Tablet (ISORDIL) 10 milliGRAM(s) Oral three times a day  metoprolol succinate ER 75 milliGRAM(s) Oral daily  rivaroxaban 15 milliGRAM(s) Oral with dinner  sodium bicarbonate 650 milliGRAM(s) Oral two times a day  spironolactone 50 milliGRAM(s) Oral daily      Physical Exam  General: Patient comfortable in bed   Vital Signs Last 12 Hrs  T(F): 97.5 (02-06-25 @ 13:22), Max: 97.9 (02-06-25 @ 04:14)  HR: 69 (02-06-25 @ 13:22) (69 - 82)  BP: 166/88 (02-06-25 @ 13:22) (148/84 - 167/84)  BP(mean): --  RR: 17 (02-06-25 @ 13:22) (17 - 18)  SpO2: 97% (02-06-25 @ 13:22) (97% - 98%)    CVS: S1S2   Respiratory: No wheezing, no crepitations  GI: Abdomen soft, bowel sounds positive  Musculoskeletal:  moves all extremities     Chief complaint  Patient is a 69y old  Male who presents with a chief complaint of HF exacerbation (06 Feb 2025 13:44)         Labs and Fingersticks  CAPILLARY BLOOD GLUCOSE      POCT Blood Glucose.: 159 mg/dL (06 Feb 2025 13:18)  POCT Blood Glucose.: 216 mg/dL (06 Feb 2025 08:36)  POCT Blood Glucose.: 177 mg/dL (05 Feb 2025 21:32)  POCT Blood Glucose.: 296 mg/dL (05 Feb 2025 16:34)      Anion Gap: 11 (02-06 @ 09:33)  Anion Gap: 14 (02-06 @ 06:47)  Anion Gap: 12 (02-05 @ 06:20)      Calcium: 10.1 (02-06 @ 09:33)  Calcium: 10.5 (02-06 @ 06:47)  Calcium: 10.3 (02-05 @ 06:20)          02-06    132[L]  |  95[L]  |  55[H]  ----------------------------<  322[H]  5.2   |  26  |  2.00[H]    Ca    10.1      06 Feb 2025 09:33  Mg     2.6     02-06                          15.3   10.77 )-----------( 305      ( 06 Feb 2025 06:44 )             46.1     Medications  MEDICATIONS  (STANDING):  aspirin  chewable 81 milliGRAM(s) Oral daily  dextrose 5%. 1000 milliLiter(s) (50 mL/Hr) IV Continuous <Continuous>  dextrose 5%. 1000 milliLiter(s) (100 mL/Hr) IV Continuous <Continuous>  dextrose 50% Injectable 25 Gram(s) IV Push once  dextrose 50% Injectable 12.5 Gram(s) IV Push once  dextrose 50% Injectable 25 Gram(s) IV Push once  furosemide    Tablet 40 milliGRAM(s) Oral daily  glucagon  Injectable 1 milliGRAM(s) IntraMuscular once  insulin glargine Injectable (LANTUS) 12 Unit(s) SubCutaneous at bedtime  insulin lispro (ADMELOG) corrective regimen sliding scale   SubCutaneous three times a day before meals  insulin lispro (ADMELOG) corrective regimen sliding scale   SubCutaneous at bedtime  insulin lispro Injectable (ADMELOG) 6 Unit(s) SubCutaneous three times a day before meals  isosorbide   dinitrate Tablet (ISORDIL) 10 milliGRAM(s) Oral three times a day  metoprolol succinate ER 75 milliGRAM(s) Oral daily  rivaroxaban 15 milliGRAM(s) Oral with dinner  sodium bicarbonate 650 milliGRAM(s) Oral two times a day  spironolactone 50 milliGRAM(s) Oral daily      Physical Exam  General: Patient comfortable in bed   Vital Signs Last 12 Hrs  T(F): 97.5 (02-06-25 @ 13:22), Max: 97.9 (02-06-25 @ 04:14)  HR: 69 (02-06-25 @ 13:22) (69 - 82)  BP: 166/88 (02-06-25 @ 13:22) (148/84 - 167/84)  BP(mean): --  RR: 17 (02-06-25 @ 13:22) (17 - 18)  SpO2: 97% (02-06-25 @ 13:22) (97% - 98%)    CVS: S1S2   Respiratory: No wheezing, no crepitations  GI: Abdomen soft, bowel sounds positive  Musculoskeletal:  moves all extremities

## 2025-02-06 NOTE — PROGRESS NOTE ADULT - ASSESSMENT
70 y/o male with hx of CAD s/p multiple  stents, HFrEF (EF~50% as of 11/26/23), A-fib, HTN, HLD, T2DM, CKD3, former smoke discharged last week after being admitted for acute on chronic chf and influenza now admitted w worsening chf and near syncope       near syncope : ct neg  check orthostatics : neg     RSV : monitor O2   no wheezing on exam   supportive care   doubt contributing to his sob      Acute on chronic HFrEF (heart failure with reduced ejection fraction).   cont diuresis : changed to po  non compliant with meds and diet ( had salt containing snacks at bedside )   d/w wife       CAD (coronary artery disease).   cont cardiac meds   fu cards   unclear if his exertional sob is  sec to RSV and recent influenza ..pt is not wheezing.. anginal equivalent ??  d/w pul : not likely contributing    underwent cath noted   cont medical management     R groin hematomoa :   H/H stable   cont AC if k with cath team       BARRY on CKD: mild increase   monitor off lasix  if Cr stable / downtrending in AM .. will restart diuretics and dc home       ·  Problem: Chronic atrial fibrillation.   restart xarelto       ·  Problem: History of peripheral vascular disease.   off trental   d/w vasc:   aspirin     ·  Problem: Stage 3 chronic kidney disease.   ·  Plan: Creatinine at baseline  Dose meds for CKD3b  Avoid nephrotoxins.     Diabetes type 2.   monitor FS

## 2025-02-06 NOTE — CHART NOTE - NSCHARTNOTEFT_GEN_A_CORE
Request from Dr. Mcdaniel to facilitate patient discharge. Medication reconciliation reviewed, revised, and resolved with Dr. Mcdaniel who had medically cleared patient for discharge with follow-up as advised. Please refer to discharge note for detailed hospital course. Patient is currently stable for discharge to home at this time.  Alka Florian

## 2025-02-06 NOTE — PROGRESS NOTE ADULT - ASSESSMENT
Assessment  DMT2: 69y Male with DM T2 with hyperglycemia, A1C 6.5, was on oral dapagliflozin and basal lantus 10u &  lispro 11u at home, blood sugars fluctuating and had hypoglycemia episode, resolved, sugars are running high. .  CAD: on medications, stable, monitored.  HTN: on antihypertensive medications, monitored, asymptomatic.  CKD: Monitor labs/BMP,         Discussed plan and management with Dr Flo Jamil NP - TEAMS  Myrna Rossi MD  Cell: [phone]  Office: [phone]          Assessment  DMT2: 69y Male with DM T2 with hyperglycemia, A1C 6.5, was on oral dapagliflozin and basal lantus 10u &  lispro 11u at home, blood sugars fluctuating and had hypoglycemia episode, resolved, sugars are running high. .  CAD: on medications, stable, monitored.  HTN: on antihypertensive medications, monitored, asymptomatic.  CKD: Monitor labs/BMP,         Discussed plan and management with Dr Flo Jamil NP - TEAMS  Myrna Rossi MD  Cell: [phone]  Office: [phone]

## 2025-02-06 NOTE — PROVIDER CONTACT NOTE (OTHER) - ACTION/TREATMENT ORDERED:
Provider notified. Cath NP at bedside Ingrid Tejada. Site checked, Pulse present with doppler. CHIDI HAIDER  ordered.

## 2025-02-06 NOTE — DISCHARGE NOTE NURSING/CASE MANAGEMENT/SOCIAL WORK - FINANCIAL ASSISTANCE
[facility] provides services at a reduced cost to those who are determined to be eligible through [facility]’s financial assistance program. Information regarding [facility]’s financial assistance program can be found by going to [email or web address] or by calling [phone].

## 2025-02-06 NOTE — CHART NOTE - NSCHARTNOTEFT_GEN_A_CORE
Patient cleared for discharge. However, [facility] reports patient does not have medication coverage and is unable to pay out-of-pocket. Spoke with floor pharmacist Lorraine who will investigate options for medication dispensation. Dr. Mcdaniel notified of the issue.

## 2025-02-06 NOTE — DISCHARGE NOTE NURSING/CASE MANAGEMENT/SOCIAL WORK - NSDCFUADDAPPT_GEN_ALL_CORE_FT
APPTS ARE READY TO BE MADE: [ ] YES    Best Family or Patient Contact (if needed):    Additional Information about above appointments (if needed):    1: PCP  2: cardio  3: podiatry  4: vascular  5: renal  6: endo    Other comments or requests:

## 2025-02-06 NOTE — CHART NOTE - NSCHARTNOTEFT_GEN_A_CORE
Called to assess RFA site by RN this AM.  Patient is s/p diagnostic LHC on 2/4 via RFA access, hemostasis was obtained with angioseal closure device.  RFA site is ecchymotic without swelling but firm directly over insertion site, patient tender to even light palpation @ site.  Moderate manual pressure applied to site without resolution of firm area.  Femoral pulse palpable, no bruit auscultated, pedal pulses + with doppler, extremities cool b/l.  H/H and VS trend stable.  Case discussed with Dr. Dominguez.    Plan  - US duplex to r/o pseudoaneurysm  - continue to monitor  - plan discussed with ACP on 4 bradley Arauz NP  [id number]  available on teams Called to assess RFA site by RN this AM.  Patient is s/p diagnostic C on 2/4 via RFA access, hemostasis was obtained with angioseal closure device.  RFA site is ecchymotic without swelling but firm directly over insertion site, patient tender to even light palpation @ site.  Moderate manual pressure applied to site without resolution of firm area.  Femoral pulse palpable, no bruit auscultated, pedal pulses + with doppler, extremities cool b/l.  H/H and VS trend stable.  Case discussed with Dr. Dominguez.    Plan  - US duplex to r/o pseudoaneurysm  - continue to monitor  - plan discussed with PETEY Florian on 4 bradley Arauz NP  [phone]  available on teams    Addendum  US report showing Small hematoma measuring 1.8 x 0.6 x 2.3 cm, without internal flow, no pseudoaneurysm.  Discussed with Dr. Dominguez, no interventions necessary.  Discussed with LEANN Florian.  Interventional Cardiology will sign off please reconsult prn.    Ingrid Arauz NP  [phone]

## 2025-02-06 NOTE — PROGRESS NOTE ADULT - SUBJECTIVE AND OBJECTIVE BOX
Overnight events noted      VITAL:  T(C): , Max: 36.7 (02-05-25 @ 21:00)  T(F): , Max: 98 (02-05-25 @ 21:00)  HR: 79 (02-06-25 @ 12:33)  BP: 162/84 (02-06-25 @ 12:33)  BP(mean): --  RR: 18 (02-06-25 @ 12:33)  SpO2: 98% (02-06-25 @ 12:33)  Wt(kg): --      PHYSICAL EXAM:  Constitutional: NAD, Alert  HEENT: NCAT, DMM  Neck: Supple, No JVD  Respiratory: CTA-b/l  Cardiovascular: reg s1s2  Gastrointestinal: BS+, soft, NT/ND  Extremities: No peripheral edema b/l  Neurological: no focal deficits; strength grossly intact  Back: no CVAT b/l  Skin: No rashes, no nevi    LABS:                        15.3   10.77 )-----------( 305      ( 06 Feb 2025 06:44 )             46.1     Na(132)/K(5.2)/Cl(95)/HCO3(26)/BUN(55)/Cr(2.00)Glu(322)/Ca(10.1)/Mg(--)/PO4(--)    02-06 @ 09:33  Na(133)/K(5.4)/Cl(94)/HCO3(25)/BUN(58)/Cr(2.09)Glu(230)/Ca(10.5)/Mg(2.6)/PO4(--)    02-06 @ 06:47  Na(130)/K(4.9)/Cl(95)/HCO3(23)/BUN(55)/Cr(2.32)Glu(280)/Ca(10.3)/Mg(--)/PO4(--)    02-05 @ 06:20  Na(133)/K(4.4)/Cl(100)/HCO3(18)/BUN(52)/Cr(1.98)Glu(184)/Ca(9.6)/Mg(--)/PO4(--)    02-04 @ 06:09      IMPRESSION: 69M w/ HTN, DM2, AFib, CAD-10 stents, HFrEF, PAD, and CKD, 1/31/25 p/w SOB in association with RSV    (1)CKD - 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     (2)BARRY - resolving/resolved as of today    (3)Hyperkalemia - mild/acceptable for now    (4)Cardiac - s/p diagnostic cath 2/4    (5)ID - RSV - clinically improved      RECOMMEND:  (1)Meds as ordered  (2)No objection to discharge; can f/u at my office 2/19 as previously planned              Markell Walton MD  [facility]  Office/on call physician: [phone]  Cell (7a-7p): [phone]       no complaints      VITAL:  T(C): , Max: 36.7 (02-05-25 @ 21:00)  T(F): , Max: 98 (02-05-25 @ 21:00)  HR: 79 (02-06-25 @ 12:33)  BP: 162/84 (02-06-25 @ 12:33)  BP(mean): --  RR: 18 (02-06-25 @ 12:33)  SpO2: 98% (02-06-25 @ 12:33)  Wt(kg): --      PHYSICAL EXAM:  Constitutional: NAD, Alert  HEENT: NCAT, DMM  Neck: Supple, No JVD  Respiratory: CTA-b/l  Cardiovascular: reg s1s2  Gastrointestinal: BS+, soft, NT/ND  Extremities: No peripheral edema b/l  Neurological: no focal deficits; strength grossly intact  Back: no CVAT b/l  Skin: No rashes, no nevi    LABS:                        15.3   10.77 )-----------( 305      ( 06 Feb 2025 06:44 )             46.1     Na(132)/K(5.2)/Cl(95)/HCO3(26)/BUN(55)/Cr(2.00)Glu(322)/Ca(10.1)/Mg(--)/PO4(--)    02-06 @ 09:33  Na(133)/K(5.4)/Cl(94)/HCO3(25)/BUN(58)/Cr(2.09)Glu(230)/Ca(10.5)/Mg(2.6)/PO4(--)    02-06 @ 06:47  Na(130)/K(4.9)/Cl(95)/HCO3(23)/BUN(55)/Cr(2.32)Glu(280)/Ca(10.3)/Mg(--)/PO4(--)    02-05 @ 06:20  Na(133)/K(4.4)/Cl(100)/HCO3(18)/BUN(52)/Cr(1.98)Glu(184)/Ca(9.6)/Mg(--)/PO4(--)    02-04 @ 06:09      IMPRESSION: 69M w/ HTN, DM2, AFib, CAD-10 stents, HFrEF, PAD, and CKD, 1/31/25 p/w SOB in association with RSV    (1)CKD - 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     (2)BARRY - resolving/resolved as of today    (3)Hyperkalemia - mild/acceptable for now    (4)Cardiac - s/p diagnostic cath 2/4    (5)ID - RSV - clinically improved      RECOMMEND:  (1)Meds as ordered  (2)No objection to discharge; can f/u at my office 2/19 as previously planned              Markell Walton MD  [facility]  Office/on call physician: [phone]  Cell (7a-7p): [phone]

## 2025-02-06 NOTE — PROGRESS NOTE ADULT - ASSESSMENT
69M w/ CAD s/p stents, HFrEF, AFib, HTN, T2DM, CKD3 presenting to the hospital with exertional dyspnea. Patient was recently admitted with foot cellulitis, arterial insufficiency, heart failure exacerbation and influenza comes in with SOB  BNP 90674. Flu A  + and RSV +   CATH 2/4: Occluded distal LAD.  Remainder of anatomy is unchanged from prior  angiogram.  Medical therapy and maximize antianginals  - constellation of symptoms multifactorial. Likely due to CAD, HF due to  outpt non compliance and residual RSV/FLu A.   - CATH with no intervention  - cont antianginals  - Cr 2.09 will need to restart Lasix Appreciate Nephrology f/u  - D/w pt and wife

## 2025-02-06 NOTE — PROGRESS NOTE ADULT - SUBJECTIVE AND OBJECTIVE BOX
[facility]  Division of Infectious Diseases  [phone]    Name: KRISTY COYLE  Age: 69y  Gender: Male  MRN: [id number]    Interval History--  Notes reviewed.     Past Medical History--  Carotid Stenosis    Diabetes Mellitus    Neuropathy    Former smoker    CAD in native artery    Type 2 diabetes mellitus    Hyperlipidemia, unspecified hyperlipidemia type    Essential hypertension, hypertension with unspecified goal    Afib    Hematoma of leg    Stented coronary artery    CHF (congestive heart failure)    No Past Surgical History    s/p Carotid Endarterectomy        For details regarding the patient's social history, family history, and other miscellaneous elements, please refer the initial infectious diseases consultation and/or the admitting history and physical examination for this admission.    Allergies    atorvastatin (Muscle Pain (Severe))    Intolerances        Medications--  Antibiotics:    Immunologic:    Other:  aspirin  chewable  dextrose 5%.  dextrose 5%.  dextrose 50% Injectable  dextrose 50% Injectable  dextrose 50% Injectable  dextrose Oral Gel PRN  glucagon  Injectable  insulin glargine Injectable (LANTUS)  insulin lispro (ADMELOG) corrective regimen sliding scale  insulin lispro (ADMELOG) corrective regimen sliding scale  insulin lispro Injectable (ADMELOG)  isosorbide   dinitrate Tablet (ISORDIL)  metoprolol succinate ER  rivaroxaban  sodium bicarbonate  spironolactone      Review of Systems--  A 10-point review of systems was obtained.     Pertinent positives and negatives--  Constitutional: No fevers. No Chills. No Rigors.   Cardiovascular: No chest pain. No palpitations.  Respiratory: No shortness of breath. No cough.  Gastrointestinal: No nausea or vomiting. No diarrhea or constipation.   Psychiatric: Pleasant. Appropriate affect.    Review of systems otherwise negative except as previously noted.    Physical Examination--  Vital Signs: T(F): 97.4 (02-06-25 @ 08:05), Max: 98 (02-05-25 @ 21:00)  HR: 73 (02-06-25 @ 08:05)  BP: 148/84 (02-06-25 @ 08:05)  RR: 18 (02-06-25 @ 08:05)  SpO2: 98% (02-06-25 @ 08:05)  Wt(kg): --  General: Nontoxic-appearing Male in no acute distress.  HEENT: AT/NC. PERRL. EOMI. Anicteric. Conjunctiva pink and moist. Oropharynx clear. Dentition fair.  Neck: Not rigid. No sense of mass.  Nodes: None palpable.  Lungs: Clear bilaterally without rales, wheezing or rhonchi  Heart: Regular rate and rhythm. No Murmur. No rub. No gallop. No palpable thrill.  Abdomen: Bowel sounds present and normoactive. Soft. Nondistended. Nontender. No sense of mass. No organomegaly.  Back: No spinal tenderness. No costovertebral angle tenderness.   Extremities: No cyanosis or clubbing. No edema.   Skin: Warm. Dry. Good turgor. No rash. No vasculitic stigmata.  Psychiatric: Appropriate affect and mood for situation.         Laboratory Studies--  CBC                        15.3   10.77 )-----------( 305      ( 06 Feb 2025 06:44 )             46.1       Chemistries  02-06    133[L]  |  94[L]  |  58[H]  ----------------------------<  230[H]  5.4[H]   |  25  |  2.09[H]    Ca    10.5      06 Feb 2025 06:47  Mg     2.6     02-06        Culture Data           [facility]  Division of Infectious Diseases  [phone]    Name: KRISTY COYLE  Age: 69y  Gender: Male  MRN: [id number]    Interval History--  Notes reviewed. Seen earlier today. Feels fine. OOB -> chair, comfortable off O2.  Noncompliant with cardiac diet.      Past Medical History--  Carotid Stenosis    Diabetes Mellitus    Neuropathy    Former smoker    CAD in native artery    Type 2 diabetes mellitus    Hyperlipidemia, unspecified hyperlipidemia type    Essential hypertension, hypertension with unspecified goal    Afib    Hematoma of leg    Stented coronary artery    CHF (congestive heart failure)    No Past Surgical History    s/p Carotid Endarterectomy        For details regarding the patient's social history, family history, and other miscellaneous elements, please refer the initial infectious diseases consultation and/or the admitting history and physical examination for this admission.    Allergies    atorvastatin (Muscle Pain (Severe))    Intolerances        Medications--  Antibiotics:    Immunologic:    Other:  aspirin  chewable  dextrose 5%.  dextrose 5%.  dextrose 50% Injectable  dextrose 50% Injectable  dextrose 50% Injectable  dextrose Oral Gel PRN  glucagon  Injectable  insulin glargine Injectable (LANTUS)  insulin lispro (ADMELOG) corrective regimen sliding scale  insulin lispro (ADMELOG) corrective regimen sliding scale  insulin lispro Injectable (ADMELOG)  isosorbide   dinitrate Tablet (ISORDIL)  metoprolol succinate ER  rivaroxaban  sodium bicarbonate  spironolactone      Review of Systems--  A 10-point review of systems was obtained.   Review of systems otherwise negative except as previously noted.    Physical Examination--  Vital Signs: T(F): 97.4 (02-06-25 @ 08:05), Max: 98 (02-05-25 @ 21:00)  HR: 73 (02-06-25 @ 08:05)  BP: 148/84 (02-06-25 @ 08:05)  RR: 18 (02-06-25 @ 08:05)  SpO2: 98% (02-06-25 @ 08:05)  Wt(kg): --  General: Nontoxic-appearing Male in no acute distress.  HEENT: AT/NC. Anicteric. Conjunctiva pink and moist. Oropharynx clear.   Neck: Not rigid. No sense of mass.  Nodes: None palpable.  Lungs: Diminished BS B no RWR  Heart: Irreg irreg  Abdomen: Bowel sounds present and normoactive. Soft. Nondistended. Nontender.  Back: Unable  Extremities: No cyanosis or clubbing. 1+ LE edema.   Skin: Warm. Dry. Good turgor. No rash. No vasculitic stigmata.  Psychiatric: Appropriate affect and mood for situation      Laboratory Studies--  CBC                        15.3   10.77 )-----------( 305      ( 06 Feb 2025 06:44 )             46.1       Chemistries  02-06    133[L]  |  94[L]  |  58[H]  ----------------------------<  230[H]  5.4[H]   |  25  |  2.09[H]    Ca    10.5      06 Feb 2025 06:47  Mg     2.6     02-06          Culture Data  No new data

## 2025-02-06 NOTE — PROGRESS NOTE ADULT - SUBJECTIVE AND OBJECTIVE BOX
SUBJECTIVE:  	  MEDICATIONS:  isosorbide   dinitrate Tablet (ISORDIL) 10 milliGRAM(s) Oral three times a day  metoprolol succinate ER 75 milliGRAM(s) Oral daily  spironolactone 50 milliGRAM(s) Oral daily            dextrose 50% Injectable 25 Gram(s) IV Push once  dextrose 50% Injectable 12.5 Gram(s) IV Push once  dextrose 50% Injectable 25 Gram(s) IV Push once  dextrose Oral Gel 15 Gram(s) Oral once PRN  glucagon  Injectable 1 milliGRAM(s) IntraMuscular once  insulin glargine Injectable (LANTUS) 12 Unit(s) SubCutaneous at bedtime  insulin lispro (ADMELOG) corrective regimen sliding scale   SubCutaneous three times a day before meals  insulin lispro (ADMELOG) corrective regimen sliding scale   SubCutaneous at bedtime  insulin lispro Injectable (ADMELOG) 6 Unit(s) SubCutaneous three times a day before meals    aspirin  chewable 81 milliGRAM(s) Oral daily  dextrose 5%. 1000 milliLiter(s) IV Continuous <Continuous>  dextrose 5%. 1000 milliLiter(s) IV Continuous <Continuous>  rivaroxaban 15 milliGRAM(s) Oral with dinner  sodium bicarbonate 650 milliGRAM(s) Oral two times a day      REVIEW OF SYSTEMS:    CONSTITUTIONAL: No fever, weight loss, or fatigue  EYES: No eye pain, visual disturbances, or discharge  NECK: No pain or stiffness  RESPIRATORY: No cough, wheezing, chills or hemoptysis; No Shortness of Breath  CARDIOVASCULAR: No chest pain, palpitations, dizziness, or leg swelling  GASTROINTESTINAL: No abdominal or epigastric pain. No nausea, vomiting, or hematemesis; No diarrhea or constipation. No melena or hematochezia.  GENITOURINARY: No dysuria, frequency, hematuria, or incontinence  NEUROLOGICAL: No headaches, memory loss, loss of strength, numbness, or tremors  SKIN: No itching, burning, rashes, or lesions   LYMPH Nodes: No enlarged glands  MUSCULOSKELETAL: No joint pain or swelling; No muscle, back, or extremity pain  All other review of systems are negative.  	  [ ] Unable to obtain    PHYSICAL EXAM:  T(C): 36.3 (02-06-25 @ 08:05), Max: 36.7 (02-05-25 @ 21:00)  HR: 73 (02-06-25 @ 08:05) (62 - 87)  BP: 148/84 (02-06-25 @ 08:05) (145/74 - 167/84)  RR: 18 (02-06-25 @ 08:05) (18 - 18)  SpO2: 98% (02-06-25 @ 08:05) (96% - 98%)  Wt(kg): --  I&O's Summary    05 Feb 2025 07:01  -  06 Feb 2025 07:00  --------------------------------------------------------  IN: 480 mL / OUT: 1820 mL / NET: -1340 mL    06 Feb 2025 07:01  -  06 Feb 2025 09:14  --------------------------------------------------------  IN: 0 mL / OUT: 400 mL / NET: -400 mL          PHYSICAL EXAM    Appearance: Normal	  HEENT:   Normal oral mucosa, PERRL, EOMI	  NECK: Soft and supple, No LAD, No JVD  Cardiovascular: Regular Rate and Rhythm, Normal S1 S2, No murmurs, No clicks, gallops or rubs  Respiratory: Lungs clear to auscultation	  Gastrointestinal:  Soft, Non-tender, + BS	  Skin: No rashes, No ecchymoses, No cyanosis  Neurologic: Non-focal  Extremities: No clubbing, cyanosis or edema  Vascular: Peripheral pulses palpable 2+ bilaterally    TELEMETRY: 	    ECG:  	  RADIOLOGY  DIAGNOSTIC TESTING:  [ ] Echocardiogram:  [ ] Catheterization:  [ ] Stress Test:    OTHER: 	    LABS:	 	    CARDIAC MARKERS:                                  15.3   10.77 )-----------( 305      ( 06 Feb 2025 06:44 )             46.1     02-06    133[L]  |  94[L]  |  58[H]  ----------------------------<  230[H]  5.4[H]   |  25  |  2.09[H]    Ca    10.5      06 Feb 2025 06:47  Mg     2.6     02-06

## 2025-02-06 NOTE — DISCHARGE NOTE NURSING/CASE MANAGEMENT/SOCIAL WORK - PATIENT PORTAL LINK FT
You can access the FollowMyHealth Patient Portal offered by [facility] by registering at the following website: [email or web address]. By joining [email or web address]’s FollowMyHealth portal, you will also be able to view your health information using other applications (apps) compatible with our system.

## 2025-02-06 NOTE — PROGRESS NOTE ADULT - ASSESSMENT
dz  RVP+  Influenza A- suspect that this is residua from recent infection, not active/new infection  RSV- while test is positive and merits precautions per protocol, not hypoxic on RA, no respiratory complaints (no cough etc., just CLEARY), lung fields without rales, wheezing, or rhonchi, CXR unchanged compared with prior.  Treatment for RSV is supportive  I see no role for antibiotics  CLEARY suspect primarily on cardiac basis given known CAD    02/05: Doing well. no concern of uncontrolled infection on the basis of exam. I see no role for antimictrobials at this time.  02/06: I see no role for antibiotics here. WBC normal. Creatinine ~same range. No concern of infection on exam.       Suggestions--  Defer antimicrobials  No ID objection to outpatient management.    D/W patient. All questions answered to the best of my ability.   Left message for Dr. Mcdaniel    I'll sign off at this time.   Thank you for the courtesy of this referral.    Chicho Ho MD  Attending Physician  [facility]  Division of Infectous Diseases  [phone]

## 2025-02-07 VITALS
TEMPERATURE: 98 F | HEART RATE: 67 BPM | SYSTOLIC BLOOD PRESSURE: 156 MMHG | RESPIRATION RATE: 18 BRPM | OXYGEN SATURATION: 96 % | DIASTOLIC BLOOD PRESSURE: 65 MMHG

## 2025-02-07 LAB
ANION GAP SERPL CALC-SCNC: 15 MMOL/L — SIGNIFICANT CHANGE UP (ref 5–17)
BUN SERPL-MCNC: 61 MG/DL — HIGH (ref 7–23)
CALCIUM SERPL-MCNC: 9.9 MG/DL — SIGNIFICANT CHANGE UP (ref 8.4–10.5)
CHLORIDE SERPL-SCNC: 98 MMOL/L — SIGNIFICANT CHANGE UP (ref 96–108)
CO2 SERPL-SCNC: 21 MMOL/L — LOW (ref 22–31)
CREAT SERPL-MCNC: 2.06 MG/DL — HIGH (ref 0.5–1.3)
EGFR: 35 ML/MIN/1.73M2 — LOW
GLUCOSE BLDC GLUCOMTR-MCNC: 168 MG/DL — HIGH (ref 70–99)
GLUCOSE BLDC GLUCOMTR-MCNC: 181 MG/DL — HIGH (ref 70–99)
GLUCOSE SERPL-MCNC: 203 MG/DL — HIGH (ref 70–99)
HCT VFR BLD CALC: 43.7 % — SIGNIFICANT CHANGE UP (ref 39–50)
HGB BLD-MCNC: 14.4 G/DL — SIGNIFICANT CHANGE UP (ref 13–17)
MAGNESIUM SERPL-MCNC: 2.4 MG/DL — SIGNIFICANT CHANGE UP (ref 1.6–2.6)
MCHC RBC-ENTMCNC: 30.1 PG — SIGNIFICANT CHANGE UP (ref 27–34)
MCHC RBC-ENTMCNC: 33 G/DL — SIGNIFICANT CHANGE UP (ref 32–36)
MCV RBC AUTO: 91.2 FL — SIGNIFICANT CHANGE UP (ref 80–100)
NRBC # BLD: 0 /100 WBCS — SIGNIFICANT CHANGE UP (ref 0–0)
NRBC BLD-RTO: 0 /100 WBCS — SIGNIFICANT CHANGE UP (ref 0–0)
PHOSPHATE SERPL-MCNC: 3.5 MG/DL — SIGNIFICANT CHANGE UP (ref 2.5–4.5)
PLATELET # BLD AUTO: 287 K/UL — SIGNIFICANT CHANGE UP (ref 150–400)
POTASSIUM SERPL-MCNC: 4.8 MMOL/L — SIGNIFICANT CHANGE UP (ref 3.5–5.3)
POTASSIUM SERPL-SCNC: 4.8 MMOL/L — SIGNIFICANT CHANGE UP (ref 3.5–5.3)
RBC # BLD: 4.79 M/UL — SIGNIFICANT CHANGE UP (ref 4.2–5.8)
RBC # FLD: 13.6 % — SIGNIFICANT CHANGE UP (ref 10.3–14.5)
SODIUM SERPL-SCNC: 134 MMOL/L — LOW (ref 135–145)
WBC # BLD: 10.34 K/UL — SIGNIFICANT CHANGE UP (ref 3.8–10.5)
WBC # FLD AUTO: 10.34 K/UL — SIGNIFICANT CHANGE UP (ref 3.8–10.5)

## 2025-02-07 PROCEDURE — 97116 GAIT TRAINING THERAPY: CPT

## 2025-02-07 PROCEDURE — 93926 LOWER EXTREMITY STUDY: CPT

## 2025-02-07 PROCEDURE — 99285 EMERGENCY DEPT VISIT HI MDM: CPT

## 2025-02-07 PROCEDURE — 85014 HEMATOCRIT: CPT

## 2025-02-07 PROCEDURE — 83605 ASSAY OF LACTIC ACID: CPT

## 2025-02-07 PROCEDURE — 80053 COMPREHEN METABOLIC PANEL: CPT

## 2025-02-07 PROCEDURE — 82435 ASSAY OF BLOOD CHLORIDE: CPT

## 2025-02-07 PROCEDURE — 85018 HEMOGLOBIN: CPT

## 2025-02-07 PROCEDURE — 36415 COLL VENOUS BLD VENIPUNCTURE: CPT

## 2025-02-07 PROCEDURE — 82330 ASSAY OF CALCIUM: CPT

## 2025-02-07 PROCEDURE — 80048 BASIC METABOLIC PNL TOTAL CA: CPT

## 2025-02-07 PROCEDURE — 87637 SARSCOV2&INF A&B&RSV AMP PRB: CPT

## 2025-02-07 PROCEDURE — 82803 BLOOD GASES ANY COMBINATION: CPT

## 2025-02-07 PROCEDURE — 0241U: CPT

## 2025-02-07 PROCEDURE — 84484 ASSAY OF TROPONIN QUANT: CPT

## 2025-02-07 PROCEDURE — C1894: CPT

## 2025-02-07 PROCEDURE — 97161 PT EVAL LOW COMPLEX 20 MIN: CPT

## 2025-02-07 PROCEDURE — 83735 ASSAY OF MAGNESIUM: CPT

## 2025-02-07 PROCEDURE — C1887: CPT

## 2025-02-07 PROCEDURE — 85730 THROMBOPLASTIN TIME PARTIAL: CPT

## 2025-02-07 PROCEDURE — 84100 ASSAY OF PHOSPHORUS: CPT

## 2025-02-07 PROCEDURE — 70450 CT HEAD/BRAIN W/O DYE: CPT | Mod: MC

## 2025-02-07 PROCEDURE — 82553 CREATINE MB FRACTION: CPT

## 2025-02-07 PROCEDURE — 85027 COMPLETE CBC AUTOMATED: CPT

## 2025-02-07 PROCEDURE — 82947 ASSAY GLUCOSE BLOOD QUANT: CPT

## 2025-02-07 PROCEDURE — 84132 ASSAY OF SERUM POTASSIUM: CPT

## 2025-02-07 PROCEDURE — 84295 ASSAY OF SERUM SODIUM: CPT

## 2025-02-07 PROCEDURE — 93454 CORONARY ARTERY ANGIO S&I: CPT

## 2025-02-07 PROCEDURE — C1769: CPT

## 2025-02-07 PROCEDURE — 97530 THERAPEUTIC ACTIVITIES: CPT

## 2025-02-07 PROCEDURE — 85025 COMPLETE CBC W/AUTO DIFF WBC: CPT

## 2025-02-07 PROCEDURE — 71046 X-RAY EXAM CHEST 2 VIEWS: CPT

## 2025-02-07 PROCEDURE — C1760: CPT

## 2025-02-07 PROCEDURE — 82962 GLUCOSE BLOOD TEST: CPT

## 2025-02-07 PROCEDURE — 83880 ASSAY OF NATRIURETIC PEPTIDE: CPT

## 2025-02-07 RX ORDER — INSULIN LISPRO 100/ML
7 VIAL (ML) SUBCUTANEOUS
Qty: 1 | Refills: 0
Start: 2025-02-07 | End: 2025-03-08

## 2025-02-07 RX ORDER — INSULIN GLARGINE-YFGN 100 [IU]/ML
15 INJECTION, SOLUTION SUBCUTANEOUS
Qty: 1 | Refills: 0
Start: 2025-02-07 | End: 2025-03-08

## 2025-02-07 RX ADMIN — Medication 50 MILLIGRAM(S): at 05:55

## 2025-02-07 RX ADMIN — ISOSORBIDE DINITRATE 10 MILLIGRAM(S): 40 TABLET ORAL at 11:35

## 2025-02-07 RX ADMIN — ASPIRIN 81 MILLIGRAM(S): 81 TABLET, COATED ORAL at 11:35

## 2025-02-07 RX ADMIN — Medication 40 MILLIGRAM(S): at 05:55

## 2025-02-07 RX ADMIN — Medication 7 UNIT(S): at 08:19

## 2025-02-07 RX ADMIN — SODIUM BICARBONATE 650 MILLIGRAM(S): 42 INJECTION, SOLUTION INTRAVENOUS at 05:55

## 2025-02-07 RX ADMIN — Medication 7 UNIT(S): at 12:30

## 2025-02-07 RX ADMIN — Medication 1: at 08:19

## 2025-02-07 RX ADMIN — ISOSORBIDE DINITRATE 10 MILLIGRAM(S): 40 TABLET ORAL at 15:14

## 2025-02-07 RX ADMIN — ISOSORBIDE DINITRATE 10 MILLIGRAM(S): 40 TABLET ORAL at 05:55

## 2025-02-07 RX ADMIN — Medication 1: at 12:29

## 2025-02-07 RX ADMIN — Medication 75 MILLIGRAM(S): at 05:56

## 2025-02-07 NOTE — PROGRESS NOTE ADULT - PROBLEM SELECTOR PLAN 2
On medications,  no chest pain, stable, monitored and followed up by primary team/cardiology team.
RVP + RSV  -Also recently flu A infection (remains positive on RVP)  -CXR grossly clear  -Supportive care  -Duoneb q6h PRN.
On medications,  no chest pain, stable, monitored and followed up by primary team/cardiology team.

## 2025-02-07 NOTE — PROGRESS NOTE ADULT - SUBJECTIVE AND OBJECTIVE BOX
\Date of service: 25 @ 15:03      Patient is a 69y old  Male who presents with a chief complaint of HF exacerbation (2025 13:18)                                                               INTERVAL HPI/OVERNIGHT EVENTS:    REVIEW OF SYSTEMS:     CONSTITUTIONAL: No weakness, fevers or chills  EYES/ENT: No visual changes , no ear ache   NECK: No pain or stiffness  RESPIRATORY: No cough, wheezing,  No shortness of breath  CARDIOVASCULAR: No chest pain or palpitations  GASTROINTESTINAL: No abdominal pain  . No nausea, vomiting, or hematemesis; No diarrhea or constipation. No melena or hematochezia.  GENITOURINARY: No dysuria, frequency or hematuria  NEUROLOGICAL: No numbness or weakness  SKIN: No itching, burning, rashes, or lesions                                                                                                                                                                                                                                                                                 Medications:  MEDICATIONS  (STANDING):  aspirin  chewable 81 milliGRAM(s) Oral daily  dextrose 5%. 1000 milliLiter(s) (50 mL/Hr) IV Continuous <Continuous>  dextrose 5%. 1000 milliLiter(s) (100 mL/Hr) IV Continuous <Continuous>  dextrose 50% Injectable 25 Gram(s) IV Push once  dextrose 50% Injectable 12.5 Gram(s) IV Push once  dextrose 50% Injectable 25 Gram(s) IV Push once  furosemide    Tablet 40 milliGRAM(s) Oral daily  glucagon  Injectable 1 milliGRAM(s) IntraMuscular once  insulin glargine Injectable (LANTUS) 15 Unit(s) SubCutaneous at bedtime  insulin lispro (ADMELOG) corrective regimen sliding scale   SubCutaneous three times a day before meals  insulin lispro (ADMELOG) corrective regimen sliding scale   SubCutaneous at bedtime  insulin lispro Injectable (ADMELOG) 7 Unit(s) SubCutaneous three times a day before meals  isosorbide   dinitrate Tablet (ISORDIL) 10 milliGRAM(s) Oral three times a day  metoprolol succinate ER 75 milliGRAM(s) Oral daily  rivaroxaban 15 milliGRAM(s) Oral with dinner  sodium bicarbonate 650 milliGRAM(s) Oral two times a day  spironolactone 50 milliGRAM(s) Oral daily    MEDICATIONS  (PRN):  dextrose Oral Gel 15 Gram(s) Oral once PRN Blood Glucose LESS THAN 70 milliGRAM(s)/deciliter       Allergies    atorvastatin (Muscle Pain (Severe))    Intolerances      Vital Signs Last 24 Hrs  T(C): 36.6 (2025 11:37), Max: 36.8 (2025 21:12)  T(F): 97.9 (:37), Max: 98.2 (2025 21:12)  HR: 77 (:37) (59 - 85)  BP: 151/79 (:37) (133/69 - 153/66)  BP(mean): --  RR: 17 (:37) (17 - 18)  SpO2: 97% (:37) (97% - 98%)    Parameters below as of 2025 11:37  Patient On (Oxygen Delivery Method): room air      CAPILLARY BLOOD GLUCOSE      POCT Blood Glucose.: 168 mg/dL (2025 11:42)  POCT Blood Glucose.: 181 mg/dL (2025 07:42)  POCT Blood Glucose.: 235 mg/dL (2025 21:43)  POCT Blood Glucose.: 168 mg/dL (2025 16:58)       @ 07: @ 07:00  --------------------------------------------------------  IN: 200 mL / OUT: 950 mL / NET: -750 mL     @ 07: @ 15:03  --------------------------------------------------------  IN: 0 mL / OUT: 1525 mL / NET: -1525 mL      Physical Exam:    Daily     Daily Weight in k.1 (2025 08:04)  General:  Well appearing, NAD, not cachetic  HEENT:  Nonicteric, PERRLA  CV:  RRR, S1S2   Lungs:  CTA B/L, no wheezes, rales, rhonchi  Abdomen:  Soft, non-tender, no distended, positive BS  Extremities:  Trace edema  Neuro:  AAOx3, non-focal, grossly intact                                                                                                                                                                                                                                                                                                LABS:                               14.4   10.34 )-----------( 287      ( 2025 06:11 )             43.7                      02-    134[L]  |  98  |  61[H]  ----------------------------<  203[H]  4.8   |  21[L]  |  2.06[H]    Ca    9.9      2025 06:11  Phos  3.5     02-  Mg     2.4     -                         RADIOLOGY & ADDITIONAL TESTS         I personally reviewed: [  ]EKG   [  ]CXR    [  ] CT      A/P:         Discussed with :     Derek consultants' Notes   Time spent :

## 2025-02-07 NOTE — PROGRESS NOTE ADULT - REASON FOR ADMISSION
HF exacerbation

## 2025-02-07 NOTE — PROGRESS NOTE ADULT - PROVIDER SPECIALTY LIST ADULT
Cardiology
Endocrinology
Internal Medicine
Internal Medicine
Nephrology
Cardiology
Endocrinology
Infectious Disease
Internal Medicine
Internal Medicine
Nephrology
Pulmonology
Endocrinology
Infectious Disease
Internal Medicine
Cardiology
Internal Medicine
Internal Medicine
Endocrinology

## 2025-02-07 NOTE — PROGRESS NOTE ADULT - SUBJECTIVE AND OBJECTIVE BOX
no complaints. Stable Cr.       VITAL:  T(C): , Max: 36.7 (02-05-25 @ 21:00)  T(F): , Max: 98 (02-05-25 @ 21:00)  HR: 79 (02-06-25 @ 12:33)  BP: 162/84 (02-06-25 @ 12:33)  BP(mean): --  RR: 18 (02-06-25 @ 12:33)  SpO2: 98% (02-06-25 @ 12:33)  Wt(kg): --      PHYSICAL EXAM:  Constitutional: NAD, Alert  HEENT: NCAT, DMM  Neck: Supple, No JVD  Respiratory: CTA-b/l  Cardiovascular: reg s1s2  Gastrointestinal: BS+, soft, NT/ND  Extremities: No peripheral edema b/l  Neurological: no focal deficits; strength grossly intact  Back: no CVAT b/l  Skin: No rashes, no nevi    LABS:                        15.3   10.77 )-----------( 305      ( 06 Feb 2025 06:44 )             46.1     Na(132)/K(5.2)/Cl(95)/HCO3(26)/BUN(55)/Cr(2.00)Glu(322)/Ca(10.1)/Mg(--)/PO4(--)    02-06 @ 09:33  Na(133)/K(5.4)/Cl(94)/HCO3(25)/BUN(58)/Cr(2.09)Glu(230)/Ca(10.5)/Mg(2.6)/PO4(--)    02-06 @ 06:47  Na(130)/K(4.9)/Cl(95)/HCO3(23)/BUN(55)/Cr(2.32)Glu(280)/Ca(10.3)/Mg(--)/PO4(--)    02-05 @ 06:20  Na(133)/K(4.4)/Cl(100)/HCO3(18)/BUN(52)/Cr(1.98)Glu(184)/Ca(9.6)/Mg(--)/PO4(--)    02-04 @ 06:09      IMPRESSION: 69M w/ HTN, DM2, AFib, CAD-10 stents, HFrEF, PAD, and CKD, 1/31/25 p/w SOB in association with RSV    (1)CKD - 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     (2)BARRY - resolved    (3)Hyperkalemia - mild/acceptable for now    (4)Cardiac - s/p diagnostic cath 2/4    (5)ID - RSV - clinically improved      RECOMMEND:  (1)Meds as ordered, c/w sodium bicarb 650 BID and lasix 40mg PO qd  (2)No objection to discharge; can f/u at my office 2/19 as previously planned w/Dr. Anton Velásquez DO   [facility]  Office/on call physician: [phone]

## 2025-02-07 NOTE — PROGRESS NOTE ADULT - SUBJECTIVE AND OBJECTIVE BOX
Chief complaint    Patient is a 69y old  Male who presents with a chief complaint of HF exacerbation (06 Feb 2025 14:45)   Review of systems  Patient appears comfortable.    Labs and Fingersticks  CAPILLARY BLOOD GLUCOSE      POCT Blood Glucose.: 181 mg/dL (07 Feb 2025 07:42)  POCT Blood Glucose.: 235 mg/dL (06 Feb 2025 21:43)  POCT Blood Glucose.: 168 mg/dL (06 Feb 2025 16:58)  POCT Blood Glucose.: 159 mg/dL (06 Feb 2025 13:18)      Anion Gap: 15 (02-07 @ 06:11)  Anion Gap: 11 (02-06 @ 09:33)  Anion Gap: 14 (02-06 @ 06:47)      Calcium: 9.9 (02-07 @ 06:11)  Calcium: 10.1 (02-06 @ 09:33)  Calcium: 10.5 (02-06 @ 06:47)          02-07    134[L]  |  98  |  61[H]  ----------------------------<  203[H]  4.8   |  21[L]  |  2.06[H]    Ca    9.9      07 Feb 2025 06:11  Phos  3.5     02-07  Mg     2.4     02-07                          14.4   10.34 )-----------( 287      ( 07 Feb 2025 06:11 )             43.7     Medications  MEDICATIONS  (STANDING):  aspirin  chewable 81 milliGRAM(s) Oral daily  dextrose 5%. 1000 milliLiter(s) (100 mL/Hr) IV Continuous <Continuous>  dextrose 5%. 1000 milliLiter(s) (50 mL/Hr) IV Continuous <Continuous>  dextrose 50% Injectable 25 Gram(s) IV Push once  dextrose 50% Injectable 12.5 Gram(s) IV Push once  dextrose 50% Injectable 25 Gram(s) IV Push once  furosemide    Tablet 40 milliGRAM(s) Oral daily  glucagon  Injectable 1 milliGRAM(s) IntraMuscular once  insulin glargine Injectable (LANTUS) 15 Unit(s) SubCutaneous at bedtime  insulin lispro (ADMELOG) corrective regimen sliding scale   SubCutaneous three times a day before meals  insulin lispro (ADMELOG) corrective regimen sliding scale   SubCutaneous at bedtime  insulin lispro Injectable (ADMELOG) 7 Unit(s) SubCutaneous three times a day before meals  isosorbide   dinitrate Tablet (ISORDIL) 10 milliGRAM(s) Oral three times a day  metoprolol succinate ER 75 milliGRAM(s) Oral daily  rivaroxaban 15 milliGRAM(s) Oral with dinner  sodium bicarbonate 650 milliGRAM(s) Oral two times a day  spironolactone 50 milliGRAM(s) Oral daily      Physical Exam  General: Patient appears comfortable.  Vital Signs Last 12 Hrs  T(F): 97.5 (02-07-25 @ 08:04), Max: 98.2 (02-06-25 @ 21:12)  HR: 59 (02-07-25 @ 08:04) (59 - 73)  BP: 144/79 (02-07-25 @ 08:04) (144/79 - 153/66)  BP(mean): --  RR: 18 (02-07-25 @ 08:04) (18 - 18)  SpO2: 98% (02-07-25 @ 08:04) (97% - 98%)  Neck: No palpable thyroid nodules.  CVS: S1S2, No murmurs  Respiratory: No wheezing, no crepitations  GI: Abdomen soft, non tender.    Diagnostics        Radiology:

## 2025-02-07 NOTE — PHARMACOTHERAPY INTERVENTION NOTE - COMMENTS
Alerted by ACP that patient does not have prescription insurance for his insulin medications. Patient is not new to insulin (was on Lantus and Humalog pens at home) and already knows how to self-inject. However, he did not renew his insulin coverage for 2025 year but pt states he has a number he has to call to renew the plan. States he's able to get his oral medications from the pharmacy and has extra medications at home already but is out of his insulin pens. Patient is currently uninsured in regards to insulin and is retired.     Enrolled patient into [employer] [employer] [place] program at [facility] which will send eligible medications on formulary to the patient's home at no cost or pt's wife will go there to . Attestation form faxed over on 2/7/25. Explained the program to the patient and wife at bedside and patient agrees to enrollment. When pt is ready for discharge, please resend the pt's insulin prescriptions to [facility] at [facility]. [facility] has Basaglar long-acting pens and Humalog pre-meal pens on formulary for $0. Explained to pt the change from Lantus to Basaglar (but can go back to Lantus once insurance coverage kicks back in). Communicated to medicine ACP.    Lorraine Fisher, PharmD, BCPS  Clinical Pharmacy Specialist  Teams (preferred) or [phone]

## 2025-02-07 NOTE — PROGRESS NOTE ADULT - PROBLEM SELECTOR PLAN 3
Monitor labs and electrolytes, on renal medications, Renal FU.
-Per cards  -Keep O>I as tolerated.

## 2025-02-07 NOTE — PROGRESS NOTE ADULT - PROBLEM SELECTOR PROBLEM 3
Stage 3 chronic kidney disease
CHF (congestive heart failure)

## 2025-02-07 NOTE — PROGRESS NOTE ADULT - ASSESSMENT
Assessment  DMT2: 69y Male with DM T2 with hyperglycemia, A1C 6.5, was on oral dapagliflozin and basal lantus 10u &  lispro 11u at home, blood sugars are trending down.  CAD: on medications, stable, monitored.  HTN: on antihypertensive medications, monitored, asymptomatic.  CKD: Monitor labs/BMP,       Myrna Rossi MD  Cell: [phone]  Office: [phone]

## 2025-02-07 NOTE — PROGRESS NOTE ADULT - PROBLEM SELECTOR PROBLEM 2
CAD (coronary artery disease)
RSV (respiratory syncytial virus infection)
CAD (coronary artery disease)

## 2025-02-07 NOTE — PROGRESS NOTE ADULT - SUBJECTIVE AND OBJECTIVE BOX
No dyspnea  /79  HR 59  Lungs clear  Irregular rhythm 2/6 systolic murmur  1+ edema on the right and trace on the left    BUN 61 Crt 2.06  CBC  normal    Imp:  HF exacerbation - resolved   Renal function back to baseline  Recent Influenza and RSV infections  Stable for DC to home from a CV stand point

## 2025-02-07 NOTE — PROGRESS NOTE ADULT - PROBLEM SELECTOR PLAN 1
Will cincrease Lantus to 10u qhs.  ontinue current insulin regimen for now.   Will continue monitoring FS, log, and glucose trends, will Follow up.  Patient counseled for compliance with consistent low carb diet and exercise as tolerated outpatient.   Can get dc on current basal bolus insulins if glucose stable  May use his home insulins brands  FU outpt
Will continue current insulin regimen for now.   Will continue monitoring FS, log, and glucose trends, will Follow up.  Patient counseled for compliance with consistent low carb diet and exercise as tolerated outpatient.   Can get dc on current basal bolus insulins if glucose stable  May use his home insulins brands  FU outpt
Will increase Lantus to 15u qhs.  Increase Humalog to 7 units TID with meals   Will continue monitoring FS, log, and glucose trends, will Follow up.  Patient counseled for compliance with consistent low carb diet and exercise as tolerated outpatient.   Can get dc on current basal bolus insulins if glucose stable  May use his home insulins brands  FU outpt
-Possibly 2nd to mild fluid overload + RSV infection  -Pt reports SOB has now resolved at rest, +CLEARY   -CXR grossly clear  -Duoneb q6h PRN  -Diuresis per cards, keep O>I as tolerated  -Keep sats >90% with O2 PRN (currently RA)   -CLEARY now improving
Will continue current insulin regimen for now. Will continue monitoring  blood sugars, will Follow up.  Patient counseled for compliance with consistent low carb diet.  .   Can get dc on current basal bolus insulins if glucose remains within acceptable range.  May use his home insulins brands  FU outpt

## 2025-02-07 NOTE — PROGRESS NOTE ADULT - ASSESSMENT
70 y/o male with hx of CAD s/p multiple  stents, HFrEF (EF~50% as of 11/26/23), A-fib, HTN, HLD, T2DM, CKD3, former smoke discharged last week after being admitted for acute on chronic chf and influenza now admitted w worsening chf and near syncope       near syncope : ct neg  check orthostatics : neg     RSV : monitor O2   no wheezing on exam   supportive care   doubt contributing to his sob      Acute on chronic HFrEF (heart failure with reduced ejection fraction).   cont diuresis : changed to po  non compliant with meds and diet ( had salt containing snacks at bedside )   d/w wife       CAD (coronary artery disease).   cont cardiac meds   fu cards   unclear if his exertional sob is  sec to RSV and recent influenza ..pt is not wheezing.. anginal equivalent ??  d/w pul : not likely contributing    underwent cath noted   cont medical management     R groin hematomoa :   H/H stable   cont AC if k with cath team       BARRY on CKD: mild increase   monitor off lasix  if Cr stable / downtrending in AM .. will restart diuretics and dc home       ·  Problem: Chronic atrial fibrillation.   restart xarelto       ·  Problem: History of peripheral vascular disease.   off trental   d/w vasc:   aspirin     ·  Problem: Stage 3 chronic kidney disease.   ·  Plan: Creatinine at baseline  Dose meds for CKD3b  Avoid nephrotoxins.     Diabetes type 2.   monitor FS     Patient is stable for discharge  See discharge summary

## 2025-02-13 NOTE — PROGRESS NOTE ADULT - PROBLEM/PLAN-2
DISPLAY PLAN FREE TEXT
Current and Past Psychiatric Diagnoses/Presenting Symptoms/Treatment Related Factors

## 2025-02-18 ENCOUNTER — APPOINTMENT (OUTPATIENT)
Dept: VASCULAR SURGERY | Facility: CLINIC | Age: 70
End: 2025-02-18

## 2025-02-18 NOTE — H&P ADULT - PROBLEM SELECTOR PLAN 6
[facility]   CONSULTATION  [phone]      Attending Physician: Eddi Wadsworth MD  Reason for Consultation/Chief Complaint: NSTEMI    Subjective   History of Present Illness:  Prasanth Bazzi is a 61 y.o. male with a history of CAD s/p P PCI to mid-RCA with 2 EMANUEL in 1/2024, essential hypertension, and tobacco use who presented with chest pain.    The patient reports that yesterday he developed chest pain that he describes as 2 people sitting on his chest.  He says that he then woke up later today diaphoretic and with more intense chest pain, so he came to the ED for further evaluation.    He says that he lost his insurance and quit taking his medications approximately 7-8 months ago.  He was taking an aspirin.  He now says he has new insurance.    On presentation to the ED, initial EKG showed sinus bradycardia with poor R wave progression in the precordial leads, inferior infarct pattern, and inferior T wave inversions.  Initial high-sensitivity troponin was elevated at 1269.  The patient was started on IV heparin infusion and taken to the cardiac Cath Lab for urgent invasive coronary angiography and percutaneous coronary intervention.      Past Medical History:   has a past medical history of Acute inferior myocardial infarction (HCC), Back injury, Hypertension, Movement disorder, MRSA (methicillin resistant staph aureus) culture positive, Seizures (HCC), and STEMI (ST elevation myocardial infarction) ([facility]).    Surgical History:   has a past surgical history that includes Upper gastrointestinal endoscopy (9/10/12).     Social History:   reports that he has been smoking cigarettes. He has a 18.5 pack-year smoking history. He has never used smokeless tobacco. He reports that he does not drink alcohol and does not use drugs.     Family History:  family history includes Cancer in his father and mother; Diabetes in his mother; Heart Disease in his mother; Stroke in his father and mother.      Home  - A1c 7.6% on 04/17/24  - c/w Farxiga 10mg qd  - Hold insulin pen from home  - Start Lantus 15U qhs and Admelog 7U w/ meals along w/ JOHNNY as per recent hospitalization   - Monitor FS and adjust insulin to avoid hypoglycemia  - Diabetic education  - CC diet

## 2025-03-11 ENCOUNTER — APPOINTMENT (OUTPATIENT)
Dept: VASCULAR SURGERY | Facility: CLINIC | Age: 70
End: 2025-03-11
Payer: MEDICARE

## 2025-03-11 VITALS
HEART RATE: 94 BPM | TEMPERATURE: 97.5 F | BODY MASS INDEX: 22.66 KG/M2 | HEIGHT: 66 IN | WEIGHT: 141 LBS | DIASTOLIC BLOOD PRESSURE: 79 MMHG | SYSTOLIC BLOOD PRESSURE: 171 MMHG

## 2025-03-11 DIAGNOSIS — L98.499 STRICTURE OF ARTERY: ICD-10-CM

## 2025-03-11 DIAGNOSIS — I77.1 STRICTURE OF ARTERY: ICD-10-CM

## 2025-03-11 PROCEDURE — 99214 OFFICE O/P EST MOD 30 MIN: CPT

## 2025-03-11 PROCEDURE — 93923 UPR/LXTR ART STDY 3+ LVLS: CPT

## 2025-03-11 PROCEDURE — 93926 LOWER EXTREMITY STUDY: CPT

## 2025-03-25 ENCOUNTER — APPOINTMENT (OUTPATIENT)
Dept: VASCULAR SURGERY | Facility: CLINIC | Age: 70
End: 2025-03-25
Payer: MEDICARE

## 2025-03-25 VITALS
HEIGHT: 66 IN | WEIGHT: 132 LBS | HEART RATE: 91 BPM | BODY MASS INDEX: 21.21 KG/M2 | SYSTOLIC BLOOD PRESSURE: 157 MMHG | DIASTOLIC BLOOD PRESSURE: 84 MMHG

## 2025-03-25 DIAGNOSIS — L98.499 STRICTURE OF ARTERY: ICD-10-CM

## 2025-03-25 DIAGNOSIS — I77.1 STRICTURE OF ARTERY: ICD-10-CM

## 2025-03-25 PROCEDURE — 99214 OFFICE O/P EST MOD 30 MIN: CPT

## 2025-03-25 PROCEDURE — 93970 EXTREMITY STUDY: CPT | Mod: PD

## 2025-03-26 NOTE — ED ADULT TRIAGE NOTE - PAIN RATING/NUMBER SCALE (0-10): ACTIVITY
. . Critical Care: [id number]   This patient has a high probability of sudden, clinically significant deterioration, which requires the highest level of physician preparedness to intervene urgently. I managed/supervised life or organ supporting interventions that required frequent physician assessment. I devoted my full attention in the ICU to the direct care of this patient for the period of time indicated below. Time I spent with the family or surrogate(s) is included only if the patient was incapable of providing the necessary information or participating in medical decision making. Time devoted to teaching and to any procedures I billed separately is not included.     EVELINA READ 40y Female admitted to [ ] SICU /[ x] SDU with ascending cholangitis S/P laparoscopic cholecystectomy. Patient presented to ED with obstructive juandice and pain, high risk for hemodynamic instability   Patient is examined and evaluated at the bedside with SICU team. Treatment plan discussed with SICU team, nurses and primary team.   Chest X-ray and all relevant studies reviewed during rounds.  Will continue hemodynamic monitoring as per protocol in SICU.    Neuro:  GCS [ 15]   [x ]  Neurovascular checks as per SICU protocol                 [ ] 3% NaCl        Paralysis [ ] Yes  [x ]  No      Sedated/Pain control with                 [ ] Dilaudid drip, [ ]  Ketamine, [ ] Fentanyl, [ ] Propofol, [ ] Precedex, [ ] Versed, [ ] Ativan,                           [ ] OxyContin standing,  [ ] OxyContin PRN, [x ] Dilaudid PRN pushes, [ ] Fentanyl PRN pushes, [ ] PCA,                [x ] Tylenol IV/PO, [x ] Gabapentin, [ ]  Ketorolac, [ ] Tramadol,  [ ] Lidoderm Patch       Other Medications               [ ] Seroquel, [ ] Zyprexa, [ ] Haldol, [ ] Zyprexa,  [ ] Clonazepam [ ] Xanax, [ ] Versed/Ativan PRN, [ ] Valium [ x] None               [ ] Robaxin   [ ] Baclofen  [ ] Flexeril               [ ] Keppra  [ ] Lamictal  [ ] Depakote  [ ] Dilantin               [ ] CIWA (Ativan/Valium/ Librium)  CV: continue to monitor  On pressors [ ]  Yes  [ x]  No          [ ]  Levophed, [ ] Brennan-Synephrine, [ ] Vasopressin, [ ]  Epinephrine          [ ] Dobutamine, [ ] Milrinone, [ ]  Midodrine,  [ ] Others    Other Cardiac Meds          [ ] Amiodarone IV/PO, [ ] Digoxin, [ ] Cardizem drip, [ ] Cleviprex drip, [ ] Esmolol drip    Respiratory: Acute respiratory insufficiency -> continue monitoring                        None Invasive Support  [x ] Incentive Spirometer                                  [ ] BiPAP   [ ] CPAP/NIV   [ ] HFNC   [ ] NR Face Mask   [x ] NC  [ ] Trach Caller                        Ventilatory support  [ ] Yes [x ] No   [ ] SBT                                  [ ] PC    [ ] VC   [ ] AC   [ ] BiVent/APRV   [ ]CPAP   GI  [x ]  bowel regiment  [x ] BM none  [x ] Flatus+ [ ] Ostomy        Prophylaxis [ ] PPI  [ ] H2 Blockers  [ ] Others  Nutrition: continue   [ x] Diet NPO  [ ] TPN/PPN   [ ] calories count   [ ]  Tube Feeds     Renal: Continue I&Os monitoring, Bee catheter  [ x] Yes,  [ ]   No ,  [ ] Consideration for discontinuation [ ] Taxes cath/PrimaFit  [ ] TOV       Lytes/Acid-base: replete hypokalemia, hypomagnesemia, hypocalcemia, hypophosphatemia        IV Fluids   [x ] LR@125ml/hr,  [ ] NS  [ ] D5W1/2NS [ ] Bicarbonate  [ ] Albumin   ID: leukocytosis -> continue to monitor:         IV Abx [x ] Yes, [ ] No;  ID consulted [ ] Yes, [x ] No        Cultures send  [ ] Respiratory   [ ] Blood   [ ] Urine  [ ] Fluids  [ ] Tissue  [x ]  None  Lines:   [ ] Right   [ ] Left  [ ] Bilateral                     [ ] Subclavian TLC        [ ]  Internal Jugular TLC     [ ]  Femoral TLC                     [ ] Subclavian Cordis    [ ] Internal Jugular Cordis   [ ] Femoral Cordis                     [ ] HD catheter    [ ] PICC     [ ]  Midline   [ x] Peripheral IVs                                                 [ ] Right   [ ] Left   [ x] None                     [ ] Radial A-Line    [ ] Femoral A-Line   [ ] Axillary A-Line               Heme: continue to evaluate for acute blood loss anemia- trend Hg/Hct                     AC Reversal Indicated [ ] Yes  [x ] No                    Transfused  indicated  [ ] Yes, [x ] No    [ ] PRBCs   [ ] Platelets   [ ] FFPs   [ ] Cryoprecipitate                    Should be started on or continued with  following  [ ] Yes,  [x ] No                               [ ] Lovenox  [ ] Coumadin  [ ] Heparin drip  [ ] NOVACs  [ ] ASA  [ ] Antiplatelets   Endocrine: Prevent and treat hyperglycemia with insulin as needed,                                 Insuline drip [ ] Yes, [ x] No   PV: follow pulse exam  Skin: decub precautions  DVT Prophylaxis:  [x] SCDs  [ x] Heparin SQ  [ ] Lovenox  SQ  Stress Gastritis Prophylaxis: PPI/H2 Blockers if indicated  Mobility: patient is evaluated at the bedside with mobility team and the goals for today are discussed with PT [x ]    PATIENT/FAMILY/SURROGATE CONFERENCE:  [x ] Yes with patient at the bedside. [ ] No  PURPOSE: To obtain necessary information, To discuss treatment options under consideration today.    I saw and evaluated the patient personally. I have reviewed and agree with note above. Treatment plan discussed with SICU team, nurses and primary team at the time of the multidisciplinary rounds. The above note is NOT written at the time of rounds and will reflect all changes throughout management of the patient for the day note is written for.    Farideh Valdes MD, FACS  Trauma/ACS/SCC Attending Critical Care: [id number]   This patient has a high probability of sudden, clinically significant deterioration, which requires the highest level of physician preparedness to intervene urgently. I managed/supervised life or organ supporting interventions that required frequent physician assessment. I devoted my full attention in the ICU to the direct care of this patient for the period of time indicated below. Time I spent with the family or surrogate(s) is included only if the patient was incapable of providing the necessary information or participating in medical decision making. Time devoted to teaching and to any procedures I billed separately is not included.     EVELINA READ 40y Female admitted to [ ] SICU /[ x] SDU with ascending cholangitis S/P laparoscopic cholecystectomy. Patient presented to ED with obstructive juandice and pain, high risk for hemodynamic instability, S/P ERCP with stones removed and stents placements   Patient is examined and evaluated at the bedside with SICU team. Treatment plan discussed with SICU team, nurses and primary team.   Chest X-ray and all relevant studies reviewed during rounds.  Will continue hemodynamic monitoring as per protocol in SICU.    Neuro:  GCS [ 15]   [x ]  Neurovascular checks as per SICU protocol                 [ ] 3% NaCl        Paralysis [ ] Yes  [x ]  No      Sedated/Pain control with                 [ ] Dilaudid drip, [ ]  Ketamine, [ ] Fentanyl, [ ] Propofol, [ ] Precedex, [ ] Versed, [ ] Ativan,                           [ ] OxyContin standing,  [ ] OxyContin PRN, [x ] Dilaudid PRN pushes, [ ] Fentanyl PRN pushes, [ ] PCA,                [x ] Tylenol IV/PO, [x ] Gabapentin, [ ]  Ketorolac, [ ] Tramadol,  [ ] Lidoderm Patch       Other Medications               [ ] Seroquel, [ ] Zyprexa, [ ] Haldol, [ ] Zyprexa,  [ ] Clonazepam [ ] Xanax, [ ] Versed/Ativan PRN, [ ] Valium [ x] None               [ ] Robaxin   [ ] Baclofen  [ ] Flexeril               [ ] Keppra  [ ] Lamictal  [ ] Depakote  [ ] Dilantin               [ ] CIWA (Ativan/Valium/ Librium)  CV: continue to monitor  On pressors [ ]  Yes  [ x]  No          [ ]  Levophed, [ ] Brennan-Synephrine, [ ] Vasopressin, [ ]  Epinephrine          [ ] Dobutamine, [ ] Milrinone, [ ]  Midodrine,  [ ] Others    Other Cardiac Meds          [ ] Amiodarone IV/PO, [ ] Digoxin, [ ] Cardizem drip, [ ] Cleviprex drip, [ ] Esmolol drip    Respiratory: Acute respiratory insufficiency -> continue monitoring                        None Invasive Support  [x ] Incentive Spirometer                                  [ ] BiPAP   [ ] CPAP/NIV   [ ] HFNC   [ ] NR Face Mask   [x ] NC  [ ] Trach Caller                        Ventilatory support  [ ] Yes [x ] No   [ ] SBT                                  [ ] PC    [ ] VC   [ ] AC   [ ] BiVent/APRV   [ ]CPAP   GI  [x ]  bowel regiment  [x ] BM none  [x ] Flatus+ [ ] Ostomy        Prophylaxis [ ] PPI  [ ] H2 Blockers  [ ] Others  Nutrition: continue   [ x] Diet clears  [ ] TPN/PPN   [ ] calories count   [ ]  Tube Feeds     Renal: Continue I&Os monitoring, Bee catheter  [ x] Yes,  [ ]   No ,  [ ] Consideration for discontinuation [ ] Taxes cath/PrimaFit  [ ] TOV       Lytes/Acid-base: replete hypokalemia, hypomagnesemia, hypocalcemia, hypophosphatemia        IV Fluids   [x ] LR@125ml/hr,  [ ] NS  [ ] D5W1/2NS [ ] Bicarbonate  [ ] Albumin   ID: leukocytosis -> continue to monitor:         IV Abx [x ] Yes, [ ] No;  ID consulted [ ] Yes, [x ] No        Cultures send  [ ] Respiratory   [ ] Blood   [ ] Urine  [ ] Fluids  [ ] Tissue  [x ]  None  Lines:   [ ] Right   [ ] Left  [ ] Bilateral                     [ ] Subclavian TLC        [ ]  Internal Jugular TLC     [ ]  Femoral TLC                     [ ] Subclavian Cordis    [ ] Internal Jugular Cordis   [ ] Femoral Cordis                     [ ] HD catheter    [ ] PICC     [ ]  Midline   [ x] Peripheral IVs                                                 [ ] Right   [ ] Left   [ x] None                     [ ] Radial A-Line    [ ] Femoral A-Line   [ ] Axillary A-Line               Heme: continue to evaluate for acute blood loss anemia- trend Hg/Hct                     AC Reversal Indicated [ ] Yes  [x ] No                    Transfused  indicated  [ ] Yes, [x ] No    [ ] PRBCs   [ ] Platelets   [ ] FFPs   [ ] Cryoprecipitate                    Should be started on or continued with  following  [ ] Yes,  [x ] No                               [ ] Lovenox  [ ] Coumadin  [ ] Heparin drip  [ ] NOVACs  [ ] ASA  [ ] Antiplatelets   Endocrine: Prevent and treat hyperglycemia with insulin as needed,                                 Insuline drip [ ] Yes, [ x] No   PV: follow pulse exam  Skin: decub precautions  DVT Prophylaxis:  [x] SCDs  [ x] Heparin SQ  [ ] Lovenox  SQ  Stress Gastritis Prophylaxis: PPI/H2 Blockers if indicated  Mobility: patient is evaluated at the bedside with mobility team and the goals for today are discussed with PT [x ]    PATIENT/FAMILY/SURROGATE CONFERENCE:  [x ] Yes with patient at the bedside. [ ] No  PURPOSE: To obtain necessary information, To discuss treatment options under consideration today.    I saw and evaluated the patient personally. I have reviewed and agree with note above. Treatment plan discussed with SICU team, nurses and primary team at the time of the multidisciplinary rounds. The above note is NOT written at the time of rounds and will reflect all changes throughout management of the patient for the day note is written for.    Farideh Valdes MD, FACS  Trauma/ACS/SCC Attending patient seen, for ERCP today. Critical Care: [id number]   This patient has a high probability of sudden, clinically significant deterioration, which requires the highest level of physician preparedness to intervene urgently. I managed/supervised life or organ supporting interventions that required frequent physician assessment. I devoted my full attention in the ICU to the direct care of this patient for the period of time indicated below. Time I spent with the family or surrogate(s) is included only if the patient was incapable of providing the necessary information or participating in medical decision making. Time devoted to teaching and to any procedures I billed separately is not included.     EVELINA READ 40y Female admitted to [ ] SICU /[ x] SDU with ascending cholangitis S/P laparoscopic cholecystectomy. Patient presented to ED with obstructive juandice and pain, high risk for hemodynamic instability, S/P ERCP with stones removed and stents placements   Patient is examined and evaluated at the bedside with SICU team. Treatment plan discussed with SICU team, nurses and primary team.   Chest X-ray and all relevant studies reviewed during rounds.  Will continue hemodynamic monitoring as per protocol in SICU.    Neuro:  GCS [ 15]   [x ]  Neurovascular checks as per SICU protocol                 [ ] 3% NaCl        Paralysis [ ] Yes  [x ]  No      Sedated/Pain control with                 [ ] Dilaudid drip, [ ]  Ketamine, [ ] Fentanyl, [ ] Propofol, [ ] Precedex, [ ] Versed, [ ] Ativan,                           [ ] OxyContin standing,  [ ] OxyContin PRN, [x ] Dilaudid PRN pushes, [ ] Fentanyl PRN pushes, [ ] PCA,                [x ] Tylenol IV/PO, [x ] Gabapentin, [ ]  Ketorolac, [ ] Tramadol,  [ ] Lidoderm Patch       Other Medications               [ ] Seroquel, [ ] Zyprexa, [ ] Haldol, [ ] Zyprexa,  [ ] Clonazepam [ ] Xanax, [ ] Versed/Ativan PRN, [ ] Valium [ x] None               [ ] Robaxin   [ ] Baclofen  [ ] Flexeril               [ ] Keppra  [ ] Lamictal  [ ] Depakote  [ ] Dilantin               [ ] CIWA (Ativan/Valium/ Librium)  CV: continue to monitor  On pressors [ ]  Yes  [ x]  No          [ ]  Levophed, [ ] Brennan-Synephrine, [ ] Vasopressin, [ ]  Epinephrine          [ ] Dobutamine, [ ] Milrinone, [ ]  Midodrine,  [ ] Others    Other Cardiac Meds          [ ] Amiodarone IV/PO, [ ] Digoxin, [ ] Cardizem drip, [ ] Cleviprex drip, [ ] Esmolol drip  Respiratory: Acute respiratory insufficiency -> continue monitoring                        None Invasive Support  [x ] Incentive Spirometer                                  [ ] BiPAP   [ ] CPAP/NIV   [ ] HFNC   [ ] NR Face Mask   [x ] NC  [ ] Trach Caller                        Ventilatory support  [ ] Yes [x ] No   [ ] SBT                                  [ ] PC    [ ] VC   [ ] AC   [ ] BiVent/APRV   [ ]CPAP   GI  [x ]  bowel regiment  [x ] BM none  [x ] Flatus+ [ ] Ostomy        Prophylaxis [ ] PPI  [ ] H2 Blockers  [ ] Others  Nutrition: continue   [ x] Diet regular  [ ] TPN/PPN   [ ] calories count   [ ]  Tube Feeds     Renal: Continue I&Os monitoring, Bee catheter  [ x] Yes,  [ ]   No ,  [ ] Consideration for discontinuation [ ] Taxes cath/PrimaFit  [ ] TOV       Lytes/Acid-base: replete hypokalemia, hypomagnesemia, hypocalcemia, hypophosphatemia        IV Fluids   [x ] LR@125ml/hr,  [ ] NS  [ ] D5W1/2NS [ ] Bicarbonate  [ ] Albumin   ID: leukocytosis -> continue to monitor:         IV Abx [x ] Yes, [ ] No;  ID consulted [ ] Yes, [x ] No        Cultures send  [ ] Respiratory   [ ] Blood   [ ] Urine  [ ] Fluids  [ ] Tissue  [x ]  None  Lines:   [ ] Right   [ ] Left  [ ] Bilateral                     [ ] Subclavian TLC        [ ]  Internal Jugular TLC     [ ]  Femoral TLC                     [ ] Subclavian Cordis    [ ] Internal Jugular Cordis   [ ] Femoral Cordis                     [ ] HD catheter    [ ] PICC     [ ]  Midline   [ x] Peripheral IVs                                                 [ ] Right   [ ] Left   [ x] None                     [ ] Radial A-Line    [ ] Femoral A-Line   [ ] Axillary A-Line               Heme: continue to evaluate for acute blood loss anemia- trend Hg/Hct                     AC Reversal Indicated [ ] Yes  [x ] No                    Transfused  indicated  [ ] Yes, [x ] No    [ ] PRBCs   [ ] Platelets   [ ] FFPs   [ ] Cryoprecipitate                    Should be started on or continued with  following  [ ] Yes,  [x ] No                               [ ] Lovenox  [ ] Coumadin  [ ] Heparin drip  [ ] NOVACs  [ ] ASA  [ ] Antiplatelets   Endocrine: Prevent and treat hyperglycemia with insulin as needed,                                 Insuline drip [ ] Yes, [ x] No   PV: follow pulse exam  Skin: decub precautions  DVT Prophylaxis:  [x] SCDs  [ x] Heparin SQ  [ ] Lovenox  SQ  Stress Gastritis Prophylaxis: PPI/H2 Blockers if indicated  Mobility: patient is evaluated at the bedside with mobility team and the goals for today are discussed with PT [x ]    PATIENT/FAMILY/SURROGATE CONFERENCE:  [x ] Yes with patient at the bedside. [ ] No  PURPOSE: To obtain necessary information, To discuss treatment options under consideration today.    I saw and evaluated the patient personally. I have reviewed and agree with note above. Treatment plan discussed with SICU team, nurses and primary team at the time of the multidisciplinary rounds. The above note is NOT written at the time of rounds and will reflect all changes throughout management of the patient for the day note is written for.    Farideh Valdes MD, FACS  Trauma/ACS/SCC Attending pt improving s/p ERCP. Can advance diet as tolerated. She will need repeat ERCP in 4 weeks for stent removal.   LFTs downtrending. Check LFTs daily.   Please call back w/ questions. 3 (mild pain)

## 2025-03-26 NOTE — ED PROVIDER NOTE - PHYSICAL EXAMINATION
CONSTITUTIONAL: Well appearing and in no apparent distress.  ENT: Airway patent, moist mucous membranes.   EYES: Pupils equal, round and reactive to light. EOMI. Conjunctiva normal appearing.   CARDIAC: Normal rate, regular rhythm.  Heart sounds S1, S2.    RESPIRATORY: Breath sounds clear and equal bilaterally.   GASTROINTESTINAL: Abdomen soft, non-tender, not distended.  MUSCULOSKELETAL: BLE edema. Legs wrapped in ACE bandage, pt prefers not to have them unwrapped.   NEUROLOGICAL: Alert and oriented x3, no focal deficits.

## 2025-03-26 NOTE — ED ADULT TRIAGE NOTE - SPO2 (%)
Called and informed Maribel that labs showed:  Cholesterol mildly increased from prior. Continue healthy diet and exercise. Remainder of labs are stable. Thyroid has normal t3 and t4 with slightly overactive tsh. Will continue to monitor this. No medication is needed at this time.  Patient verbalized understanding.      94

## 2025-03-26 NOTE — ED PROVIDER NOTE - OBJECTIVE STATEMENT
69 yo M with a PMH of CAD, HTN, DM, CKD, CHF w/ reduced EF, referred to ED by Cardiology Dr. Alfred Moon for worsening shortness of breath and bilateral leg swelling over the past several days. Pt states sxs not improved despite uptitration of his oral diuretic regimen, and despite recent placement on SQ Furoscix. Pt reports compliance with medications. SOB worse with exertion. States his sxs feel similar to when he was previously admitted for HF exacerbation. Denies nausea, vomiting, fever, chills, cough, palpitations, abd pain, headache, dizziness.

## 2025-03-26 NOTE — CONSULT NOTE ADULT - SUBJECTIVE AND OBJECTIVE BOX
*** SURGERY CONSULT NOTE    Consulting Team: Vascular Surgery     Patient: KRISTY COYLE , 70y (59)Male   MRN: [id number]  Location: [place] [address]  Visit: 25 Inpatient  Date: 25 @ 18:28      HPI: 69M, former smoker, PMHx HTN, HLD, DM, CKD3, CAD s/p PCI, HFrEF, afib on Xarelto, remote L CEA, and known PAD s/p recent LLE angio with PT and peroneal stent placement () presenting with c/f heart failure exacerbation. Patient reports that over the past 1 to 2 weeks he has had worsening bilateral lower extremity edema and shortness of breath worse with exertion. Vascular consulted for LLE wound      3/11/2025 NIKHIL/PVR > RLE mild to mod infra inguinal and LLE mild to mod infra inguinal arterial insuff w vessel calcification; Rt NIKHIL 1.14 Lt NIKHIL NC  ?  3/11/2025 LLE Arterial Duplex > patent PTA and Peroneal stent no sig stenosis  ?  3/25/2025 Tan LE Venous Doppler > no acute dvt or svt, no sono evidence of venous insuff           PAST MEDICAL HISTORY:  Carotid Stenosis    Diabetes Mellitus    Neuropathy    Former smoker    CAD in native artery    Type 2 diabetes mellitus    Hyperlipidemia, unspecified hyperlipidemia type    Essential hypertension, hypertension with unspecified goal    Afib    Hematoma of leg    Stented coronary artery    CHF (congestive heart failure)        PAST SURGICAL HISTORY:  No Past Surgical History    s/p Carotid Endarterectomy        MEDICATIONS:  buMETAnide Infusion 1 mG/Hr IV Continuous <Continuous>      ALLERGIES:  atorvastatin (Muscle Pain (Severe))      VITALS & I/Os:  Vital Signs Last 24 Hrs  T(C): 36.6 (26 Mar 2025 14:06), Max: 36.6 (26 Mar 2025 14:06)  T(F): 97.8 (26 Mar 2025 14:06), Max: 97.8 (26 Mar 2025 14:06)  HR: 81 (26 Mar 2025 15:33) (81 - 87)  BP: 163/77 (26 Mar 2025 15:33) (134/77 - 163/77)  BP(mean): --  RR: 20 (26 Mar 2025 15:33) (20 - 20)  SpO2: 96% (26 Mar 2025 15:33) (94% - 96%)    Parameters below as of 26 Mar 2025 15:33  Patient On (Oxygen Delivery Method): room air        I&O's Summary      PHYSICAL EXAM:  General Appearance: no acute distress, NTND   Chest: airway intact, non-labored breathing  Extremities: WWP, L dorsal foot wound, +L AT/PT signals and +R AT signal     LABS:                        14.2   8.83  )-----------( 240      ( 26 Mar 2025 14:48 )             43.9         135  |  94[L]  |  44[H]  ----------------------------<  375[H]  4.7   |  26  |  2.17[H]    Ca    9.4      26 Mar 2025 14:48    TPro  6.5  /  Alb  3.9  /  TBili  1.7[H]  /  DBili  x   /  AST  23  /  ALT  17  /  AlkPhos  141[H]      Lactate:   @ 14:48  1.6              Urinalysis Basic - ( 26 Mar 2025 16:17 )    Color: Yellow / Appearance: Clear / S.016 / pH: x  Gluc: x / Ketone: Negative mg/dL  / Bili: Negative / Urobili: 1.0 mg/dL   Blood: x / Protein: Negative mg/dL / Nitrite: Negative   Leuk Esterase: Negative / RBC: x / WBC x   Sq Epi: x / Non Sq Epi: x / Bacteria: x

## 2025-03-26 NOTE — ED ADULT NURSE NOTE - NSFALLHARMRISKINTERV_ED_ALL_ED

## 2025-03-26 NOTE — H&P ADULT - PROBLEM SELECTOR PLAN 2
-hold home oral dm meds  -c/w 15u lantus + 8uTIDwM  + SS -podiatry consult in the morning  -no obvious s/s of infection and no leukocytosis.   -if develops fever, start vancomycin.   -podiatry consult in the morning

## 2025-03-26 NOTE — ED PROVIDER NOTE - ATTENDING APP SHARED VISIT CONTRIBUTION OF CARE
Attending Beckie: I performed a face to face evaluation of the patient and obtained a history as well as performed a physical exam. I have discussed their management with the BRISEYDA. I have reviewed the BRISEYDA note and agree with the documented findings and plan of care, except as noted. This was a shared visit with an BRISEYDA. I reviewed and verified the documentation and independently performed my own history/exam/medical decision making. My medical decision making and observations are found above. Please refer to any progress notes for updates on clinical course. My notes supersedes the above BRISEYDA note in case of discrepancy

## 2025-03-26 NOTE — CONSULT NOTE ADULT - SUBJECTIVE AND OBJECTIVE BOX
HPI: Mr. Velázquez is a 70 year-old man with history of multiple medical issues including hypertension, type 2 diabetes mellitus, coronary artery disease, congestive heart failure with reduced EF, and stage 4 chronic kidney disease. He is well-known to me from the outpatient setting. He was sent today by Cardiology Dr. Alfred Moon to the [facility] ER with persistent shortness of breath and bilateral leg swelling over the past several days, despite uptitration of his oral diuretic regimen, and despite recent placement on SQ Furoscix.     PAST MEDICAL & SURGICAL HISTORY:  PAST MEDICAL & SURGICAL HISTORY:  HTN  HLD  DM2  CAD - stent  HFpEF  AFib  CKD  Carotid stenosis - L CEA    Allergies  atorvastatin (Muscle Pain (Severe))    SOCIAL HISTORY:  (+)former smoker    FAMILY HISTORY:  Family history of heart disease -father  age 71  FH: atrial fibrillation -sister    REVIEW OF SYSTEMS:  CONSTITUTIONAL: No weakness, fevers or chills  EYES/ENT: No visual changes;  No vertigo or throat pain   NECK: No pain or stiffness  RESPIRATORY: (+)SOB  CARDIOVASCULAR: No chest pain or palpitations; (+)swelling  GASTROINTESTINAL: No abdominal or epigastric pain. No nausea, vomiting, or hematemesis; No diarrhea or constipation. No melena or hematochezia.  GENITOURINARY: No dysuria, frequency or hematuria  NEUROLOGICAL: No numbness or weakness  SKIN: No itching, burning, rashes, or lesions   All other review of systems is negative unless indicated above.    VITAL:  T(C): , Max: 36.6 (25 @ 14:06)  T(F): , Max: 97.8 (25 @ 14:06)  HR: 81 (25 @ 15:33)  BP: 163/77 (25 @ 15:33)  RR: 20 (25 @ 15:33)  SpO2: 96% (25 @ 15:33)    PHYSICAL EXAM:  Constitutional: NAD, Alert  HEENT: NCAT, MMM  Neck: Supple, No JVD  Respiratory: CTA-b/l  Cardiovascular: RRR s1s2, no m/r/g  Gastrointestinal: BS+, soft, NT/ND  Extremities: No peripheral edema b/l  Neurological: no focal deficits; strength grossly intact  Back: no CVAT b/l  Skin: No rashes, no nevi    LABS:                        14.2   8.83  )-----------( 240      ( 26 Mar 2025 14:48 )             43.9     Na(135)/K(4.7)/Cl(94)/HCO3(26)/BUN(44)/Cr(2.17)Glu(375)/Ca(9.4)/Mg(--)/PO4(--)    03- @ 14:48      IMAGING:  < from: CT Head No Cont (25 @ 19:25) >  No evidence of acute intracranial abnormality.  No evidence of hemorrhage.  Chronic changes as above.    < from: TTE W or WO Ultrasound Enhancing Agent (25 @ 15:23) >   1. Left ventricular cavity is small. Left ventricular wall thickness is normal. Left ventricular systolic function is mildly to moderately decreased with an ejection fraction visually estimated at 40 to 45 %. Regional wall motion abnormalities present.   2. Multiple segmental abnormalities exist. See findings.   3. Unable to assess left ventricular diastolic function due to insufficient data.   4. Mild to moderate mitral regurgitation at a blood pressure of 150/85 mmHg.   5. Mild aortic regurgitation.   6. Normal right ventricular cavity size, with normal wall thickness, and borderline reduced right ventricular systolic function.   7. Estimated pulmonary artery systolic pressure is 49 mmHg, consistent with mild pulmonary hypertension.   8. No pericardial effusion seen.   9. Compared to the transthoracic echocardiogram performed on 2024, there have been no significant interval changes.          ASSESSMENT:  (1)CKD - 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 - at/near baseline  (2)CV - acute on chronic HFrEF - indicated for high-dose IV diuretics - given that he failed therapy with SQ Furoscix, I am not convinced that intermittent-dose IV diuretics would have a strong enough effect; I would opt for a Bumex gtt.    RECOMMEND:  (1)Bumex 1mg/h gtt  (2)Strict I/Os  (3)Dose new meds for GFR 20-30ml/min  (4)BMP+Mg at least daily    Thank you for involving [facility] in this patient's care.    With warm regards,    Markell Walton MD   [facility]  Office: [phone]  Cell: [phone]                 HPI: Mr. Velázquez is a 70 year-old man with history of multiple medical issues including hypertension, type 2 diabetes mellitus, coronary artery disease, congestive heart failure with reduced EF, and stage 4 chronic kidney disease. He is well-known to me from the outpatient setting. He was sent today by Cardiology Dr. Alfred Moon to the [facility] ER with persistent shortness of breath and bilateral leg swelling over the past several days, despite uptitration of his oral diuretic regimen, and despite recent placement on SQ Furoscix.     Mr. Velázquez shares that his weight actually did improve substantially with the 6-day course of Furoscix; weight trended down from 142lb to 131lb. Nonetheless, his dyspnea persisted, prompting the referral to the [facility] ER.    PAST MEDICAL & SURGICAL HISTORY:  HTN  HLD  DM2  CAD - stent  HFpEF  AFib  CKD  Carotid stenosis - L CEA    Allergies  atorvastatin (Muscle Pain (Severe))    SOCIAL HISTORY:  (+)former smoker    FAMILY HISTORY:  Family history of heart disease -father  age 71  FH: atrial fibrillation -sister    REVIEW OF SYSTEMS:  CONSTITUTIONAL: No weakness, fevers or chills  EYES/ENT: No visual changes;  No vertigo or throat pain   NECK: No pain or stiffness  RESPIRATORY: (+)SOB  CARDIOVASCULAR: No chest pain or palpitations; (+)swelling  GASTROINTESTINAL: No abdominal or epigastric pain. No nausea, vomiting, or hematemesis; No diarrhea or constipation. No melena or hematochezia.  GENITOURINARY: No dysuria, frequency or hematuria  NEUROLOGICAL: No numbness or weakness  SKIN: No itching, burning, rashes, or lesions   All other review of systems is negative unless indicated above.    VITAL:  T(C): , Max: 36.6 (25 @ 14:06)  T(F): , Max: 97.8 (25 @ 14:06)  HR: 81 (25 @ 15:33)  BP: 163/77 (25 @ 15:33)  RR: 20 (25 @ 15:33)  SpO2: 96% (25 @ 15:33)    PHYSICAL EXAM:  Constitutional: NAD, Alert  HEENT: NCAT, MMM  Neck: Supple, No JVD  Respiratory: CTA-b/l  Cardiovascular: RRR s1s2, no m/r/g  Gastrointestinal: BS+, soft, NT/ND  Extremities: 1-2+ b/l LE edema  Neurological: no focal deficits; strength grossly intact  Back: no CVAT b/l  Skin: No rashes, no nevi    LABS:                        14.2   8.83  )-----------( 240      ( 26 Mar 2025 14:48 )             43.9     Na(135)/K(4.7)/Cl(94)/HCO3(26)/BUN(44)/Cr(2.17)Glu(375)/Ca(9.4)/Mg(--)/PO4(--)     @ 14:48      IMAGING:  < from: CT Head No Cont (25 @ 19:25) >  No evidence of acute intracranial abnormality.  No evidence of hemorrhage.  Chronic changes as above.    < from: TTE W or WO Ultrasound Enhancing Agent (25 @ 15:23) >   1. Left ventricular cavity is small. Left ventricular wall thickness is normal. Left ventricular systolic function is mildly to moderately decreased with an ejection fraction visually estimated at 40 to 45 %. Regional wall motion abnormalities present.   2. Multiple segmental abnormalities exist. See findings.   3. Unable to assess left ventricular diastolic function due to insufficient data.   4. Mild to moderate mitral regurgitation at a blood pressure of 150/85 mmHg.   5. Mild aortic regurgitation.   6. Normal right ventricular cavity size, with normal wall thickness, and borderline reduced right ventricular systolic function.   7. Estimated pulmonary artery systolic pressure is 49 mmHg, consistent with mild pulmonary hypertension.   8. No pericardial effusion seen.   9. Compared to the transthoracic echocardiogram performed on 2024, there have been no significant interval changes.          ASSESSMENT:  (1)CKD - 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 - at/near baseline  (2)CV - acute on chronic HFrEF - indicated for high-dose IV diuretics - given that he failed therapy with SQ Furoscix, I am not convinced that intermittent-dose IV diuretics would have a strong enough effect; I would opt for a Bumex gtt.    RECOMMEND:  (1)Bumex 1mg/h gtt  (2)Strict I/Os  (3)Dose new meds for GFR 20-30ml/min  (4)BMP+Mg at least daily    Thank you for involving [facility] in this patient's care.    With warm regards,    Markell Walton MD   [facility]  Office: [phone]  Cell: [phone]

## 2025-03-26 NOTE — H&P ADULT - PROBLEM SELECTOR PLAN 1
-c/w bumex gtt.   -strict i/o   -consult HF tmr.   -appreciate nephro c/s    TTE: 1/20/25  LVEF 40-45%, mild MR and AR. -c/w bumex gtt.   -strict i/o   -consult HF tmr.   -appreciate nephro c/s  -c/w isordil, asa, toprol, aldactone  TTE: 1/20/25  LVEF 40-45%, mild MR and AR.

## 2025-03-26 NOTE — ED PROVIDER NOTE - CLINICAL SUMMARY MEDICAL DECISION MAKING FREE TEXT BOX
Attending Beckie: 71 y/o M w/ PMH of A fib on xarelto, HTN, DM, CKD, CAD, s/p stents, PVD s/p L PT and peroneal stent, CHF presenting w/ edema. Reports fort he past few weeks has been having worsening leg edema as well as shortness of breath. Shortness of breath worse w/ exertion and worse at night time. States he has been taking his lasix as usual and has been on a subcutaneously lasix infusion as well. Denies fevers, chills, headache, dizziness, blurred vision, chest pain, cough, abdominal pain, n/v/d/c, urinary symptoms, MSK pain, rash. Pt overall no acute distress. Head NCAT. Lungs faint crackles b/l, no resp distress. HR regular. Abd nondistended/soft/nontender. No CVA tenderness. Non focal neuro exam. Calm and cooperative. Pt here w/ BLE edema and SOB. Hx and exam most consistent w/ CHF exacerbation. Screen for ACS but doubtful. Doubtful PNA. Suspect will require admission. Plan for labs, imaging, EKG, meds. Will reassess the need for additional interventions as clinically warranted. Refer to any progress notes for updates on clinical course and as a continuation of this MDM. Attending Beckie: 71 y/o M w/ PMH of A fib on xarelto, HTN, DM, CKD, CAD, s/p stents, PVD s/p L PT and peroneal stent, CHF presenting w/ edema. Reports fort he past few weeks has been having worsening leg edema as well as shortness of breath. Shortness of breath worse w/ exertion and worse at night time. States he has been taking his lasix as usual and has been on a subcutaneously lasix infusion as well. Denies fevers, chills, headache, dizziness, blurred vision, chest pain, cough, abdominal pain, n/v/d/c, urinary symptoms, MSK pain, rash. Pt overall no acute distress. Head NCAT. Lungs faint crackles b/l, no resp distress. HR regular. Abd nondistended/soft/nontender. No CVA tenderness. Non focal neuro exam. Calm and cooperative. Pt here w/ BLE edema and SOB. Hx and exam most consistent w/ CHF exacerbation. Screen for ACS but doubtful. Doubtful PNA. Suspect will require admission. Plan for labs, imaging, EKG, meds. Will reassess the need for additional interventions as clinically warranted. Refer to any progress notes for updates on clinical course and as a continuation of this MDM.    I, Dr. Arya Butts, independently interpreted the EKG which showed a fib at a rate of 81.

## 2025-03-26 NOTE — CONSULT NOTE ADULT - ASSESSMENT
69M, former smoker, PMHx HTN, HLD, DM, CKD3, CAD s/p PCI, HFrEF, afib on Xarelto, remote L CEA, and known PAD s/p recent LLE angio with PT and peroneal stent placement (1/23) presenting with c/f heart failure exacerbation. Patient reports that over the past 1 to 2 weeks he has had worsening bilateral lower extremity edema and shortness of breath worse with exertion. Vascular consulted for LLE wound    Rec  - medical admission for c/f HF exacerbation   - no acute vascular intervention, feet WWP and motor/sensory intact  - Recent NIKHIL/PVRs from 3/11 noted >  RLE mild to mod infra inguinal and LLE mild to mod infra inguinal arterial insuff w vessel calcification; Rt NIKHIL 1.14 Lt NIKHIL NC  - LWC  - c/w AC and ASA   - pods consult  - f/u cards recs  - discussed with fellow      Vascular [id number]  Jose Pollard MD (PGY2)  69M, former smoker, PMHx HTN, HLD, DM, CKD3, CAD s/p PCI, HFrEF, afib on Xarelto, remote L CEA, and known PAD s/p recent LLE angio with PT and peroneal stent placement (1/23) presenting with c/f heart failure exacerbation. Patient reports that over the past 1 to 2 weeks he has had worsening bilateral lower extremity edema and shortness of breath worse with exertion. Vascular consulted for LLE wound    Rec  - medical admission for c/f HF exacerbation   - no acute vascular intervention, feet WWP and motor/sensory intact  no additional vasc  w/u needed at this time   - Recent NIKHIL/PVRs from 3/11 noted >  RLE mild to mod infra inguinal and LLE mild to mod infra inguinal arterial insuff w vessel calcification; Rt NIKHIL 1.14 Lt NIKHIL NC  - LWC  - c/w AC and ASA   - pods consult  - f/u cards recs  - discussed with fellow  will follow       Vascular [id number]  Jose Pollard MD (PGY2)

## 2025-03-26 NOTE — ED PROVIDER NOTE - PROGRESS NOTE DETAILS
Attending Beckie: seen by nephro. Bumex drip ordered as recommended. trop elevated but suspect due to renal function and CHF exacerbation. rpt trop pending for trending. Endorsed to Dr. Mcdaniel for admission. Pt allowed me to remove dressings on LE's. RLE slightly cooler to touch than LLE. Pulses not palpable and not audible on doppler in either extremity. Pt states this usually happens and "vascular uses a special machine to check my pulses." Chronic wound noted to dorsal aspect of L foot. No active purulent discharge from site. Vascular aware and will come eval. Marcela Foster PA-C

## 2025-03-26 NOTE — H&P ADULT - NSHPLABSRESULTS_GEN_ALL_CORE
14.2   8.83  )-----------( 240      ( 26 Mar 2025 14:48 )             43.9       03-26    133[L]  |  94[L]  |  51[H]  ----------------------------<  394[H]  5.1   |  21[L]  |  2.27[H]    Ca    9.0      26 Mar 2025 21:40  Mg     2.9     03-26    TPro  6.5  /  Alb  3.9  /  TBili  1.7[H]  /  DBili  x   /  AST  23  /  ALT  17  /  AlkPhos  141[H]  03-26              Urinalysis Basic - ( 26 Mar 2025 21:40 )    Color: x / Appearance: x / SG: x / pH: x  Gluc: 394 mg/dL / Ketone: x  / Bili: x / Urobili: x   Blood: x / Protein: x / Nitrite: x   Leuk Esterase: x / RBC: x / WBC x   Sq Epi: x / Non Sq Epi: x / Bacteria: x                  CAPILLARY BLOOD GLUCOSE

## 2025-03-26 NOTE — CONSULT NOTE ADULT - CRANIAL NERVE
mild to moderate arterial insuff w moderate  trophic skin changes  Pulse exam                         right         left   femoral        +2            +2  dpa                +1            +1  pta                 +1            +1  LLE Wound dorsum w moderate granulation tissue  3cm diam x 2mm depth

## 2025-03-26 NOTE — ED ADULT NURSE NOTE - OBJECTIVE STATEMENT
69 yo male with pmh CAD s/p stents, Afib on eliquis, CKD, DM, HTN, CHF presents to ED c/o b/l LE swelling x 2 weeks. Pt reports a/w sob on exertion and while sleeping. Pt denies cp, palpitations, h/a, dizziness/lightheadedness, weakness, changes in urination, fevers/chills. Pt states compliant with medication. Pt a&ox3, breathing spontaneous and unlabored, speaking in full sentences, recinos and following commands, b/l LE swelling noted, pedal pulses. Pt placed on CM and continuous pulse ox. Pt safety measures in place and comfort provided. Break coverage RN. 69 yo male with pmh CAD s/p stents, Afib on eliquis, CKD, DM, HTN, CHF presents to ED c/o b/l LE swelling x 2 weeks. Pt reports a/w sob on exertion and while sleeping. Pt denies cp, palpitations, h/a, dizziness/lightheadedness, weakness, changes in urination, fevers/chills. Pt states compliant with medication. Pt a&ox3, breathing spontaneous and unlabored, speaking in full sentences, recinos and following commands, b/l LE swelling noted, sensation intact. Pt placed on CM and continuous pulse ox. Pt safety measures in place and comfort provided. Break coverage RN.

## 2025-03-26 NOTE — H&P ADULT - HISTORY OF PRESENT ILLNESS
69M PMHx CAD s/p PCI, HFrEF, afib, HTN, T2DM, CKD3. Was recently admitted 1/31-2/7 ADHF, also found w/ diminshed pulse b/l feet, seen by vasc, consistent w/ PAD, no acute surgical mgnt.   Pt returns today w/  69M PMHx CAD s/p PCI, HFrEF, afib, HTN, T2DM, CKD3. Was recently admitted 1/31-2/7 ADHF, also found w/ diminshed pulse b/l feet with foot wound, seen by vasc, consistent w/ PAD, no acute surgical mgnt.   Pt returns today w/ SOB and BLE swelling, as well as left foot wound that's worsened since last admission.   Seen by nephro here, rec bumex gtt.   Seen by vasc here, no acute surgical intervention, pods c/s for wound care.

## 2025-03-26 NOTE — ED PROVIDER NOTE - CARE PLAN
1 Principal Discharge DX:	Leg swelling   Principal Discharge DX:	Acute CHF  Secondary Diagnosis:	Lower extremity edema

## 2025-03-26 NOTE — ED PROVIDER NOTE - RAPID ASSESSMENT
70-year-old male with past medical history of CAD s/p stents, HFrEF, AFib, HTN, T2DM, CKD3 presenting with shortness of breath.  Patient reports that over the past 1 to 2 weeks he has had worsening bilateral lower extremity edema and shortness of breath.  Shortness of breath is worse with exertion.  Patient reports that he was admitted to [facility] in the beginning of February with a heart failure exacerbation and symptoms today feel similar to that.  Patient was seen by his cardiologist today and instructed to come to the ER for further evaluation.    **Patient was rapidly assessed by me, Moris Wagner PA-C. A limited history was obtained. The patient will be seen and further examined/worked up in the main ED and their care will be completed by the main ED team. Receiving team will follow up on labs, analgesia, any clinical imaging, and perform reassessment and disposition of the patient as clinically indicated. All decisions regarding the progression of care will be made at their discretion.

## 2025-03-26 NOTE — CONSULT NOTE ADULT - ATTENDING COMMENTS
I Tyrone Calle MD have participated in the daily care of this patient and have performed a history and physical exam of the patient and discussed  the findings and plan with the house officer. I reviewed the resident note and agree with the findings and plan   I Tyrone Calle MD have personally seen and examined the patient at bedside

## 2025-03-26 NOTE — H&P ADULT - NSHPPHYSICALEXAM_GEN_ALL_CORE
Vital Signs Last 24 Hrs  T(C): 36.6 (26 Mar 2025 14:06), Max: 36.6 (26 Mar 2025 14:06)  T(F): 97.8 (26 Mar 2025 14:06), Max: 97.8 (26 Mar 2025 14:06)  HR: 89 (26 Mar 2025 20:07) (81 - 89)  BP: 164/89 (26 Mar 2025 20:07) (134/77 - 164/89)  BP(mean): 118 (26 Mar 2025 20:07) (118 - 118)  RR: 18 (26 Mar 2025 20:07) (18 - 20)  SpO2: 98% (26 Mar 2025 20:07) (94% - 98%)    Parameters below as of 26 Mar 2025 20:07  Patient On (Oxygen Delivery Method): room air        CONSTITUTIONAL: Well-groomed, in no apparent distress  EYES: No conjunctival or scleral injection, non-icteric  ENMT: No external nasal lesions; MMM  RESPIRATORY: Breathing comfortably; lungs CTA without wheeze/rhonchi/rales  CARDIOVASCULAR: irir, BLE edema, pitting up to the knee.   GASTROINTESTINAL: No tenderness, +BS throughout, no rebound/guarding  NEUROLOGIC: No gross focal neurological deficits, AAOX3  PSYCHIATRIC: mood and affect appropriate; appropriate insight and judgment  MSK: b/l lower leg somewhat cool and dusky appearing, although pt reports it's look like that for a while. No acute pain to suspect acute ischemia, already seen by vasc. L foot wound

## 2025-03-26 NOTE — H&P ADULT - PROBLEM SELECTOR PLAN 5
VTE ppx: home xarelto   GI ppx: n/i     Med rec: done by pharmacy no -c/w asa 81mg  -f/u lipid panel

## 2025-03-27 NOTE — CONSULT NOTE ADULT - ASSESSMENT
69M PMHx CAD s/p PCI, HFrEF, afib, HTN, T2DM, CKD3. Was recently admitted 1/31-2/7 ADHF, also found w/ diminshed pulse b/l feet with foot wound, seen by vasc, consistent w/ PAD, no acute surgical mgnt.   Pt returns to [facility] w/ SOB and BLE swelling, as well as left foot wound that's worsened since last admission.  69M PMHx CAD s/p PCI, HFrEF, afib, HTN, T2DM, CKD3. Was recently admitted 1/31-2/7 ADHF, also found w/ diminshed pulse b/l feet with foot wound, seen by vasc, consistent w/ PAD, no acute surgical mgnt.   Pt returns to [facility] w/ SOB and BLE swelling, as well as left foot wound that's worsened since last admission.   Patient with elevated troponin T and BNP.  Patient had cardica cath by Dr. Julito Dominguez on 2/4/25 and is NOT a candidate for intervention.  Medical treatment.  Continue diuresis with bumex.  Monitor Cr.   Continue metoprolol, spironolactone, isosorbide dinitrate  Keep legs elevated.  Wound care needs to evaluate left foot wound.  Will follow with you.    55 minutes were spent today preparing to see the patient, reviewing history and test results, performing a history and physical exam, ordering appropriate tests, counseling the patient, and documenting.

## 2025-03-27 NOTE — PATIENT PROFILE ADULT - FALL HARM RISK - RISK INTERVENTIONS

## 2025-03-27 NOTE — PROGRESS NOTE ADULT - ASSESSMENT
Assessment/plan:    Left dorsal foot ulceration: Stable, noninfected, present on admission  Diabetes mellitus/neuropathy  Diabetes rule out PVD    Vascular consultation noted and reviewed.  Recommend ammonium lactate lotion to heels daily.  Recommend decubitus precautions with use of Z flow boots while in house.  Recommend normal saline cleanse with Adaptic touch Acticoat and DSD to the left foot daily.  Will continue to monitor for signs of infection and/or wound care recommendations while in house.  Patient follow-up with Dr. Reaves at [phone] after discharge for continued ulcer management.

## 2025-03-27 NOTE — PROGRESS NOTE ADULT - SUBJECTIVE AND OBJECTIVE BOX
Podiatry pager #: [phone]/ [phone]    Patient is a 70y old  Male who presents with a chief complaint of CHF (27 Mar 2025 09:02)Podiatry consultation for evaluation of bilateral lower extremity wounds      HPI:  69M PMHx CAD s/p PCI, HFrEF, afib, HTN, T2DM, CKD3. Was recently admitted 1/31-2/7 ADHF, also found w/ diminshed pulse b/l feet with foot wound, seen by vasc, consistent w/ PAD, no acute surgical mgnt.   Pt returns today w/ SOB and BLE swelling, as well as left foot wound that's worsened since last admission.   Seen by nephro here, rec bumex gtt.   Seen by vasc here, no acute surgical intervention, pods c/s for wound care.  (26 Mar 2025 21:04)      PAST MEDICAL & SURGICAL HISTORY:  Former smoker      CAD in native artery      Type 2 diabetes mellitus      Hyperlipidemia, unspecified hyperlipidemia type      Essential hypertension, hypertension with unspecified goal      Afib      Hematoma of leg      Stented coronary artery      CHF (congestive heart failure)      s/p Carotid Endarterectomy  left          MEDICATIONS  (STANDING):  aspirin  chewable 81 milliGRAM(s) Oral daily  buMETAnide Infusion 1 mG/Hr (5 mL/Hr) IV Continuous <Continuous>  dextrose 5%. 1000 milliLiter(s) (50 mL/Hr) IV Continuous <Continuous>  dextrose 5%. 1000 milliLiter(s) (100 mL/Hr) IV Continuous <Continuous>  dextrose 50% Injectable 25 Gram(s) IV Push once  dextrose 50% Injectable 12.5 Gram(s) IV Push once  dextrose 50% Injectable 25 Gram(s) IV Push once  glucagon  Injectable 1 milliGRAM(s) IntraMuscular once  insulin glargine Injectable (LANTUS) 15 Unit(s) SubCutaneous at bedtime  insulin lispro (ADMELOG) corrective regimen sliding scale   SubCutaneous three times a day before meals  insulin lispro (ADMELOG) corrective regimen sliding scale   SubCutaneous at bedtime  insulin lispro Injectable (ADMELOG) 8 Unit(s) SubCutaneous three times a day before meals  isosorbide   dinitrate Tablet (ISORDIL) 10 milliGRAM(s) Oral three times a day  metoprolol succinate ER 75 milliGRAM(s) Oral daily  rivaroxaban 15 milliGRAM(s) Oral with dinner  sodium bicarbonate 650 milliGRAM(s) Oral three times a day  spironolactone 25 milliGRAM(s) Oral daily    MEDICATIONS  (PRN):  acetaminophen     Tablet .. 650 milliGRAM(s) Oral every 6 hours PRN Temp greater or equal to 38C (100.4F), Mild Pain (1 - 3)  aluminum hydroxide/magnesium hydroxide/simethicone Suspension 30 milliLiter(s) Oral every 4 hours PRN Dyspepsia  dextrose Oral Gel 15 Gram(s) Oral once PRN Blood Glucose LESS THAN 70 milliGRAM(s)/deciliter  melatonin 3 milliGRAM(s) Oral at bedtime PRN Insomnia  ondansetron Injectable 4 milliGRAM(s) IV Push every 8 hours PRN Nausea and/or Vomiting      Allergies    atorvastatin (Muscle Pain (Severe))    Intolerances        VITALS:    Vital Signs Last 24 Hrs  T(C): 36.6 (27 Mar 2025 11:50), Max: 37.4 (26 Mar 2025 23:52)  T(F): 97.8 (27 Mar 2025 11:50), Max: 99.3 (26 Mar 2025 23:52)  HR: 97 (27 Mar 2025 11:50) (89 - 99)  BP: 150/72 (27 Mar 2025 11:50) (142/93 - 164/89)  BP(mean): 98 (27 Mar 2025 11:50) (98 - 119)  RR: 18 (27 Mar 2025 11:50) (18 - 18)  SpO2: 95% (27 Mar 2025 11:50) (95% - 100%)    Parameters below as of 27 Mar 2025 11:50  Patient On (Oxygen Delivery Method): room air        LABS:                          14.6   9.60  )-----------( 262      ( 27 Mar 2025 07:01 )             44.8       03-26    133[L]  |  94[L]  |  51[H]  ----------------------------<  394[H]  5.1   |  21[L]  |  2.27[H]    Ca    9.0      26 Mar 2025 21:40  Phos  4.3     03-27  Mg     2.9     03-26    TPro  6.5  /  Alb  3.9  /  TBili  1.7[H]  /  DBili  x   /  AST  23  /  ALT  17  /  AlkPhos  141[H]  03-26      CAPILLARY BLOOD GLUCOSE      POCT Blood Glucose.: 349 mg/dL (27 Mar 2025 13:07)  POCT Blood Glucose.: 375 mg/dL (27 Mar 2025 12:38)  POCT Blood Glucose.: 221 mg/dL (27 Mar 2025 08:45)  POCT Blood Glucose.: 321 mg/dL (27 Mar 2025 03:09)  POCT Blood Glucose.: 293 mg/dL (27 Mar 2025 00:29)          LOWER EXTREMITY PHYSICAL EXAM:    Vasular: DP/PT _n/p, B/L, CFT <_4 seconds B/L, Temperature gradient _wnl, B/L.   Neuro: Epicritic sensation diminished_ to the level of _toes, B/L.  Skin: Bilateral heels fissured xerotic skin.  Dorsal lateral aspect of the left foot positive grade 2 ulceration measuring 4 cm x 3 cm x 0.2 cm in depth.  Positive mixed fibro granular tissue with mild serous drainage.  No active purulence no fluctuance, no malodor no clinical signs of cellulitis.      RADIOLOGY & ADDITIONAL STUDIES:

## 2025-03-27 NOTE — PROGRESS NOTE ADULT - SUBJECTIVE AND OBJECTIVE BOX
Overnight events noted      VITAL:  T(C): , Max: 37.4 (03-26-25 @ 23:52)  T(F): , Max: 99.3 (03-26-25 @ 23:52)  HR: 95 (03-27-25 @ 04:35)  BP: 142/93 (03-27-25 @ 04:35)  BP(mean): 119 (03-26-25 @ 23:52)  RR: 18 (03-27-25 @ 04:35)  SpO2: 98% (03-27-25 @ 04:35)  Urine output 1100cc/12h      PHYSICAL EXAM:  Constitutional: NAD, Alert  HEENT: NCAT, MMM  Neck: Supple, No JVD  Respiratory: CTA-b/l  Cardiovascular: RRR s1s2, no m/r/g  Gastrointestinal: BS+, soft, NT/ND  Extremities: 1-2+ b/l LE edema  Neurological: no focal deficits; strength grossly intact  Back: no CVAT b/l  Skin: No rashes, no nevi    LABS:                        14.6   9.60  )-----------( 262      ( 27 Mar 2025 07:01 )             44.8     Na(--)/K(--)/Cl(--)/HCO3(--)/BUN(--)/Cr(--)Glu(--)/Ca(--)/Mg(--)/PO4(4.3)    03-27 @ 06:59  Na(133)/K(5.1)/Cl(94)/HCO3(21)/BUN(51)/Cr(2.27)Glu(394)/Ca(9.0)/Mg(2.9)/PO4(--)    03-26 @ 21:40  Na(135)/K(4.7)/Cl(94)/HCO3(26)/BUN(44)/Cr(2.17)Glu(375)/Ca(9.4)/Mg(--)/PO4(--)    03-26 @ 14:48        IMPRESSION: 70M w/ HTN, DM2, CAD, HFrEF, and CKD4, 3/26/25 p/w ADHF    (1)CKD - 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 - at/near baseline  (2)CV - acute on chronic HFrEF - on Bumex gtt      RECOMMEND:  (1)Bumex 1mg/h gtt  (2)Continue strict I/Os  (3)Dose new meds for GFR 20-30ml/min  (4)BMP+Mg at least daily        Markell Walton MD  [facility]  Office/on call physician: [phone]  Cell (7a-7p): [phone]       Attests to high urine output, on Bumex gtt  No SOB, no pain    VITAL:  T(C): , Max: 37.4 (03-26-25 @ 23:52)  T(F): , Max: 99.3 (03-26-25 @ 23:52)  HR: 95 (03-27-25 @ 04:35)  BP: 142/93 (03-27-25 @ 04:35)  BP(mean): 119 (03-26-25 @ 23:52)  RR: 18 (03-27-25 @ 04:35)  SpO2: 98% (03-27-25 @ 04:35)  Urine output 1100cc/12h      PHYSICAL EXAM:  Constitutional: NAD, Alert  HEENT: NCAT, MMM  Neck: Supple, No JVD  Respiratory: CTA-b/l  Cardiovascular: RRR s1s2, no m/r/g  Gastrointestinal: BS+, soft, NT/ND  Extremities: 1-2+ b/l LE edema  Neurological: no focal deficits; strength grossly intact  Back: no CVAT b/l  Skin: No rashes, no nevi    LABS:                        14.6   9.60  )-----------( 262      ( 27 Mar 2025 07:01 )             44.8     Na(--)/K(--)/Cl(--)/HCO3(--)/BUN(--)/Cr(--)Glu(--)/Ca(--)/Mg(--)/PO4(4.3)    03-27 @ 06:59  Na(133)/K(5.1)/Cl(94)/HCO3(21)/BUN(51)/Cr(2.27)Glu(394)/Ca(9.0)/Mg(2.9)/PO4(--)    03-26 @ 21:40  Na(135)/K(4.7)/Cl(94)/HCO3(26)/BUN(44)/Cr(2.17)Glu(375)/Ca(9.4)/Mg(--)/PO4(--)    03-26 @ 14:48        IMPRESSION: 70M w/ HTN, DM2, CAD, HFrEF, and CKD4, 3/26/25 p/w ADHF    (1)CKD - 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 - at/near baseline  (2)CV - acute on chronic HFrEF - on Bumex gtt      RECOMMEND:  (1)Bumex 1mg/h gtt  (2)Continue strict I/Os  (3)Dose new meds for GFR 20-30ml/min  (4)BMP+Mg at least daily        Markell Walton MD  [facility]  Office/on call physician: [phone]  Cell (7a-7p): [phone]

## 2025-03-27 NOTE — PATIENT PROFILE ADULT - FALL HARM RISK - PATIENT NEEDS ASSISTANCE
The patient has been examined and the H&P has been reviewed:    I concur with the findings and no changes have occurred since H&P was written.    Anesthesia/Surgery risks, benefits and alternative options discussed and understood by patient/family.          Active Hospital Problems    Diagnosis  POA    Overactive bladder [N32.81]  Yes      Resolved Hospital Problems   No resolved problems to display.     
Standing

## 2025-03-27 NOTE — CONSULT NOTE ADULT - SUBJECTIVE AND OBJECTIVE BOX
CHIEF COMPLAINT: Dyspnea and LE swelling    HPI:  69M PMHx CAD s/p PCI, HFrEF, afib, HTN, T2DM, CKD3. Was recently admitted - ADHF, also found w/ diminshed pulse b/l feet with foot wound, seen by vasc, consistent w/ PAD, no acute surgical mgnt.   Pt returns today w/ SOB and BLE swelling, as well as left foot wound that's worsened since last admission.   Seen by nephro here, rec bumex gtt.   Seen by vasc here, no acute surgical intervention, pods c/s for wound care.  (26 Mar 2025 21:04)      PAST MEDICAL & SURGICAL HISTORY:  Former smoker      CAD in native artery      Type 2 diabetes mellitus      Hyperlipidemia, unspecified hyperlipidemia type      Essential hypertension, hypertension with unspecified goal      Afib      Hematoma of leg      Stented coronary artery      CHF (congestive heart failure)      s/p Carotid Endarterectomy  left          Allergies    atorvastatin (Muscle Pain (Severe))    Intolerances        SOCIAL HISTORY    Smoking Hx:  ETOH Hx:  Marital Status:  Occupational Hx:    FAMILY HISTORY:  Family history of heart disease  father  age 71    FH: atrial fibrillation  sister        MEDICATIONS:  acetaminophen     Tablet .. 650 milliGRAM(s) Oral every 6 hours PRN  aluminum hydroxide/magnesium hydroxide/simethicone Suspension 30 milliLiter(s) Oral every 4 hours PRN  aspirin  chewable 81 milliGRAM(s) Oral daily  buMETAnide Infusion 1 mG/Hr IV Continuous <Continuous>  dextrose 5%. 1000 milliLiter(s) IV Continuous <Continuous>  dextrose 5%. 1000 milliLiter(s) IV Continuous <Continuous>  dextrose 50% Injectable 25 Gram(s) IV Push once  dextrose 50% Injectable 12.5 Gram(s) IV Push once  dextrose 50% Injectable 25 Gram(s) IV Push once  dextrose Oral Gel 15 Gram(s) Oral once PRN  glucagon  Injectable 1 milliGRAM(s) IntraMuscular once  insulin glargine Injectable (LANTUS) 15 Unit(s) SubCutaneous at bedtime  insulin lispro (ADMELOG) corrective regimen sliding scale   SubCutaneous three times a day before meals  insulin lispro (ADMELOG) corrective regimen sliding scale   SubCutaneous at bedtime  insulin lispro Injectable (ADMELOG) 8 Unit(s) SubCutaneous three times a day before meals  isosorbide   dinitrate Tablet (ISORDIL) 10 milliGRAM(s) Oral three times a day  melatonin 3 milliGRAM(s) Oral at bedtime PRN  metoprolol succinate ER 75 milliGRAM(s) Oral daily  ondansetron Injectable 4 milliGRAM(s) IV Push every 8 hours PRN  rivaroxaban 15 milliGRAM(s) Oral with dinner  sodium bicarbonate 650 milliGRAM(s) Oral three times a day  spironolactone 25 milliGRAM(s) Oral daily      REVIEW OF SYSTEMS:    CONSTITUTIONAL: No weakness, fevers or chills  EYES/ENT: No visual changes;  No vertigo or throat pain   NECK: No pain or stiffness  RESPIRATORY: No cough, wheezing, hemoptysis; No shortness of breath  CARDIOVASCULAR: No chest pain or palpitations  GASTROINTESTINAL: No abdominal or epigastric pain. No nausea, vomiting, or hematemesis; No diarrhea or constipation. No melena or hematochezia.  GENITOURINARY: No dysuria, frequency or hematuria  NEUROLOGICAL: No numbness or weakness  SKIN: No itching, burning, rashes, or lesions   All other review of systems is negative unless indicated above    Vital Signs Last 24 Hrs  T(C): 36.4 (27 Mar 2025 04:35), Max: 37.4 (26 Mar 2025 23:52)  T(F): 97.5 (27 Mar 2025 04:35), Max: 99.3 (26 Mar 2025 23:52)  HR: 95 (27 Mar 2025 04:35) (81 - 99)  BP: 142/93 (27 Mar 2025 04:35) (134/77 - 164/89)  BP(mean): 119 (26 Mar 2025 23:52) (118 - 119)  RR: 18 (27 Mar 2025 04:35) (18 - 20)  SpO2: 98% (27 Mar 2025 04:35) (94% - 100%)    Parameters below as of 27 Mar 2025 04:35  Patient On (Oxygen Delivery Method): room air        I&O's Summary    26 Mar 2025 07:01  -  27 Mar 2025 07:00  --------------------------------------------------------  IN: 0 mL / OUT: 1100 mL / NET: -1100 mL        PHYSICAL EXAM:    Constitutional: NAD, awake and alert, well-developed  HEENT: PERR, EOMI  Neck: soft and supple, No LAD, No JVD  Respiratory: Breath sounds are clear bilaterally, No wheezing, rales or rhonchi  Cardiovascular: Regular rate and rhythm, normal S1 and S2,  no murmurs, gallops or rubs  Gastrointestinal: Bowel Sounds present, soft, nontender.   Extremities: No peripheral edema. No clubbing or cyanosis.  Vascular: 2+ peripheral pulses  Neurological: A/O x 3, no focal deficits  Musculoskeletal: no calf tenderness.  Skin: No rashes.      LABS: All Labs Reviewed:                        14.6   9.60  )-----------( 262      ( 27 Mar 2025 07:01 )             44.8                         14.2   8.83  )-----------( 240      ( 26 Mar 2025 14:48 )             43.9     26 Mar 2025 21:40    133    |  94     |  51     ----------------------------<  394    5.1     |  21     |  2.27   26 Mar 2025 14:48    135    |  94     |  44     ----------------------------<  375    4.7     |  26     |  2.17     Ca    9.0        26 Mar 2025 21:40  Ca    9.4        26 Mar 2025 14:48  Phos  4.3       27 Mar 2025 06:59  Mg     2.9       26 Mar 2025 21:40    TPro  6.5    /  Alb  3.9    /  TBili  1.7    /  DBili  x      /  AST  23     /  ALT  17     /  AlkPhos  141    26 Mar 2025 14:48      Blood Culture:     CARDIAC MARKERS:  Troponin T,  serum   365 ( @ 21:40)  443 ( @ 14:48)    BNP: 33.391      RADIOLOGY/EKG: Atrial fibrillation, moderate ventricular response     CHIEF COMPLAINT: Dyspnea and LE swelling    HPI:  69M PMHx CAD s/p PCI, HFrEF, afib, HTN, T2DM, CKD3. Was recently admitted - ADHF, also found w/ diminshed pulse b/l feet with foot wound, seen by vasc, consistent w/ PAD, no acute surgical mgnt.   Pt returns today w/ SOB and BLE swelling, as well as left foot wound that's worsened since last admission.   Seen by nephro here, rec bumex gtt.   Seen by vasc here, no acute surgical intervention, pods c/s for wound care.  (26 Mar 2025 21:04)  Currently feeling better.  Reports increased urination and decreased LE edema      PAST MEDICAL & SURGICAL HISTORY:  Former smoker      CAD in native artery      Type 2 diabetes mellitus      Hyperlipidemia, unspecified hyperlipidemia type      Essential hypertension, hypertension with unspecified goal      Afib      Hematoma of leg      Stented coronary artery      CHF (congestive heart failure)      s/p Carotid Endarterectomy  left          Allergies    atorvastatin (Muscle Pain (Severe))    Intolerances        SOCIAL HISTORY    Smoking Hx: None  ETOH Hx: None      FAMILY HISTORY:  Family history of heart disease  father  age 71    FH: atrial fibrillation  sister        MEDICATIONS:  acetaminophen     Tablet .. 650 milliGRAM(s) Oral every 6 hours PRN  aluminum hydroxide/magnesium hydroxide/simethicone Suspension 30 milliLiter(s) Oral every 4 hours PRN  aspirin  chewable 81 milliGRAM(s) Oral daily  buMETAnide Infusion 1 mG/Hr IV Continuous <Continuous>  dextrose 5%. 1000 milliLiter(s) IV Continuous <Continuous>  dextrose 5%. 1000 milliLiter(s) IV Continuous <Continuous>  dextrose 50% Injectable 25 Gram(s) IV Push once  dextrose 50% Injectable 12.5 Gram(s) IV Push once  dextrose 50% Injectable 25 Gram(s) IV Push once  dextrose Oral Gel 15 Gram(s) Oral once PRN  glucagon  Injectable 1 milliGRAM(s) IntraMuscular once  insulin glargine Injectable (LANTUS) 15 Unit(s) SubCutaneous at bedtime  insulin lispro (ADMELOG) corrective regimen sliding scale   SubCutaneous three times a day before meals  insulin lispro (ADMELOG) corrective regimen sliding scale   SubCutaneous at bedtime  insulin lispro Injectable (ADMELOG) 8 Unit(s) SubCutaneous three times a day before meals  isosorbide   dinitrate Tablet (ISORDIL) 10 milliGRAM(s) Oral three times a day  melatonin 3 milliGRAM(s) Oral at bedtime PRN  metoprolol succinate ER 75 milliGRAM(s) Oral daily  ondansetron Injectable 4 milliGRAM(s) IV Push every 8 hours PRN  rivaroxaban 15 milliGRAM(s) Oral with dinner  sodium bicarbonate 650 milliGRAM(s) Oral three times a day  spironolactone 25 milliGRAM(s) Oral daily      REVIEW OF SYSTEMS:    CONSTITUTIONAL: No weakness, fevers or chills  EYES/ENT: No visual changes;  No vertigo or throat pain   NECK: No pain or stiffness  RESPIRATORY: No cough, wheezing, hemoptysis; + shortness of breath  CARDIOVASCULAR: No chest pain or palpitations  GASTROINTESTINAL: No abdominal or epigastric pain. No nausea, vomiting, or hematemesis; No diarrhea or constipation. No melena or hematochezia.  GENITOURINARY: No dysuria, frequency or hematuria  NEUROLOGICAL: No numbness or weakness  SKIN: Scratch marks arms  All other review of systems is negative unless indicated above    Vital Signs Last 24 Hrs  T(C): 36.4 (27 Mar 2025 04:35), Max: 37.4 (26 Mar 2025 23:52)  T(F): 97.5 (27 Mar 2025 04:35), Max: 99.3 (26 Mar 2025 23:52)  HR: 95 (27 Mar 2025 04:35) (81 - 99)  BP: 142/93 (27 Mar 2025 04:35) (134/77 - 164/89)  BP(mean): 119 (26 Mar 2025 23:52) (118 - 119)  RR: 18 (27 Mar 2025 04:35) (18 - 20)  SpO2: 98% (27 Mar 2025 04:35) (94% - 100%)    Parameters below as of 27 Mar 2025 04:35  Patient On (Oxygen Delivery Method): room air        I&O's Summary    26 Mar 2025 07:01  -  27 Mar 2025 07:00  --------------------------------------------------------  IN: 0 mL / OUT: 1100 mL / NET: -1100 mL        PHYSICAL EXAM:    Constitutional: NAD, awake and alert, well-developed  HEENT: PERR, EOMI  Neck: soft and supple, No LAD, No JVD  Respiratory: Breath sounds are clear bilaterally, No wheezing, rales or rhonchi  Cardiovascular: Irregular rate and rhythm, normal S1 and S2,  no murmurs, gallops or rubs  Gastrointestinal: Bowel Sounds present, soft, nontender.   Extremities: No peripheral edema. No clubbing or cyanosis. Wound on ventral surface of left foot  Vascular: 2+ peripheral pulses  Neurological: A/O x 3, no focal deficits  Musculoskeletal: no calf tenderness.  Skin: No rashes.      LABS: All Labs Reviewed:                        14.6   9.60  )-----------( 262      ( 27 Mar 2025 07:01 )             44.8                         14.2   8.83  )-----------( 240      ( 26 Mar 2025 14:48 )             43.9     26 Mar 2025 21:40    133    |  94     |  51     ----------------------------<  394    5.1     |  21     |  2.27   26 Mar 2025 14:48    135    |  94     |  44     ----------------------------<  375    4.7     |  26     |  2.17     Ca    9.0        26 Mar 2025 21:40  Ca    9.4        26 Mar 2025 14:48  Phos  4.3       27 Mar 2025 06:59  Mg     2.9       26 Mar 2025 21:40    TPro  6.5    /  Alb  3.9    /  TBili  1.7    /  DBili  x      /  AST  23     /  ALT  17     /  AlkPhos  141    26 Mar 2025 14:48      Blood Culture:     CARDIAC MARKERS:  Troponin T,  serum   365 ( @ 21:40)  443 ( @ 14:48)    BNP: 33.391      RADIOLOGY/EKG: Atrial fibrillation, moderate ventricular response    CATH 2/4: Occluded distal LAD.  Remainder of anatomy is unchanged from prior  angiogram.  Medical therapy and maximize antianginals    < from: TTE W or WO Ultrasound Enhancing Agent (25 @ 15:23) >  CONCLUSIONS:      1. Left ventricular cavity is small. Left ventricular wall thickness is normal. Left ventricular systolic function is mildly to moderately decreased with an ejection fraction visually estimated at 40 to 45 %. Regional wall motion abnormalities present.   2. Multiple segmental abnormalities exist. See findings.   3. Unable to assess left ventricular diastolic function due to insufficient data.   4. Mild to moderate mitral regurgitation at a blood pressure of 150/85 mmHg.   5. Mild aortic regurgitation.   6. Normal right ventricular cavity size, with normal wall thickness, and borderline reduced right ventricular systolic function.   7. Estimated pulmonary artery systolic pressure is 49 mmHg, consistent with mild pulmonary hypertension.   8. No pericardial effusion seen.   9. Compared to the transthoracic echocardiogram performed on 2024, there have been no significant interval changes.      < end of copied text >

## 2025-03-27 NOTE — PROGRESS NOTE ADULT - SUBJECTIVE AND OBJECTIVE BOX
Date of service: 25 @ 17:23      Patient is a 70y old  Male who presents with a chief complaint of CHF (27 Mar 2025 09:02)                                                               INTERVAL HPI/OVERNIGHT EVENTS:    REVIEW OF SYSTEMS:    no cp   improving sob /edema                                                                                                                                                                                                                                                                              Medications:  MEDICATIONS  (STANDING):  ammonium lactate 12% Lotion 1 Application(s) Topical once  aspirin  chewable 81 milliGRAM(s) Oral daily  buMETAnide Infusion 1 mG/Hr (5 mL/Hr) IV Continuous <Continuous>  dextrose 5%. 1000 milliLiter(s) (50 mL/Hr) IV Continuous <Continuous>  dextrose 5%. 1000 milliLiter(s) (100 mL/Hr) IV Continuous <Continuous>  dextrose 50% Injectable 25 Gram(s) IV Push once  dextrose 50% Injectable 12.5 Gram(s) IV Push once  dextrose 50% Injectable 25 Gram(s) IV Push once  glucagon  Injectable 1 milliGRAM(s) IntraMuscular once  insulin glargine Injectable (LANTUS) 15 Unit(s) SubCutaneous at bedtime  insulin lispro (ADMELOG) corrective regimen sliding scale   SubCutaneous three times a day before meals  insulin lispro (ADMELOG) corrective regimen sliding scale   SubCutaneous at bedtime  insulin lispro Injectable (ADMELOG) 8 Unit(s) SubCutaneous three times a day before meals  isosorbide   dinitrate Tablet (ISORDIL) 10 milliGRAM(s) Oral three times a day  metoprolol succinate ER 75 milliGRAM(s) Oral daily  rivaroxaban 15 milliGRAM(s) Oral with dinner  sodium bicarbonate 650 milliGRAM(s) Oral three times a day  spironolactone 25 milliGRAM(s) Oral daily    MEDICATIONS  (PRN):  acetaminophen     Tablet .. 650 milliGRAM(s) Oral every 6 hours PRN Temp greater or equal to 38C (100.4F), Mild Pain (1 - 3)  aluminum hydroxide/magnesium hydroxide/simethicone Suspension 30 milliLiter(s) Oral every 4 hours PRN Dyspepsia  dextrose Oral Gel 15 Gram(s) Oral once PRN Blood Glucose LESS THAN 70 milliGRAM(s)/deciliter  melatonin 3 milliGRAM(s) Oral at bedtime PRN Insomnia  ondansetron Injectable 4 milliGRAM(s) IV Push every 8 hours PRN Nausea and/or Vomiting       Allergies    atorvastatin (Muscle Pain (Severe))    Intolerances      Vital Signs Last 24 Hrs  T(C): 36.6 (27 Mar 2025 11:50), Max: 37.4 (26 Mar 2025 23:52)  T(F): 97.8 (27 Mar 2025 11:50), Max: 99.3 (26 Mar 2025 23:52)  HR: 97 (27 Mar 2025 11:50) (89 - 99)  BP: 150/72 (27 Mar 2025 11:50) (142/93 - 164/89)  BP(mean): 98 (27 Mar 2025 11:50) (98 - 119)  RR: 18 (27 Mar 2025 11:50) (18 - 18)  SpO2: 95% (27 Mar 2025 11:50) (95% - 100%)    Parameters below as of 27 Mar 2025 11:50  Patient On (Oxygen Delivery Method): room air      CAPILLARY BLOOD GLUCOSE      POCT Blood Glucose.: 349 mg/dL (27 Mar 2025 13:07)  POCT Blood Glucose.: 375 mg/dL (27 Mar 2025 12:38)  POCT Blood Glucose.: 221 mg/dL (27 Mar 2025 08:45)  POCT Blood Glucose.: 321 mg/dL (27 Mar 2025 03:09)  POCT Blood Glucose.: 293 mg/dL (27 Mar 2025 00:29)       @ 07: @ 07:00  --------------------------------------------------------  IN: 0 mL / OUT: 1100 mL / NET: -1100 mL     @ 07: @ 17:23  --------------------------------------------------------  IN: 0 mL / OUT: 1540 mL / NET: -1540 mL      Physical Exam:    Daily     Daily Weight in k.3 (27 Mar 2025 07:00)  General:  Well appearing, NAD, not cachetic  HEENT:  Nonicteric, PERRLA  CV:  RRR, S1S2   Lungs:  CTA B/L, no wheezes, rales, rhonchi  Abdomen:  Soft, non-tender, no distended, positive BS  Extremities:  BLe edema  L foot in dressing                                                                                                                                                                                                                                                                                      LABS:                               14.6   9.60  )-----------( 262      ( 27 Mar 2025 07:01 )             44.8                          133[L]  |  94[L]  |  51[H]  ----------------------------<  394[H]  5.1   |  21[L]  |  2.27[H]    Ca    9.0      26 Mar 2025 21:40  Phos  4.3       Mg     2.9         TPro  6.5  /  Alb  3.9  /  TBili  1.7[H]  /  DBili  x   /  AST  23  /  ALT  17  /  AlkPhos  141[H]                         RADIOLOGY & ADDITIONAL TESTS         I personally reviewed: [  ]EKG   [  ]CXR    [  ] CT      A/P:         Discussed with :     Derek consultants' Notes   Time spent :

## 2025-03-27 NOTE — PROGRESS NOTE ADULT - SUBJECTIVE AND OBJECTIVE BOX
SURGERY DAILY PROGRESS NOTE    24 Hour/Overnight Events: No acute events overnight    SUBJECTIVE: Patient seen and evaluated on AM rounds.   ------------------------------------------------------------------------------------------------------------  OBJECTIVE:  Vital Signs Last 24 Hrs  T(C): 36.4 (27 Mar 2025 04:35), Max: 37.4 (26 Mar 2025 23:52)  T(F): 97.5 (27 Mar 2025 04:35), Max: 99.3 (26 Mar 2025 23:52)  HR: 95 (27 Mar 2025 04:35) (81 - 99)  BP: 142/93 (27 Mar 2025 04:35) (134/77 - 164/89)  BP(mean): 119 (26 Mar 2025 23:52) (118 - 119)  RR: 18 (27 Mar 2025 04:35) (18 - 20)  SpO2: 98% (27 Mar 2025 04:35) (94% - 100%)    Parameters below as of 27 Mar 2025 04:35  Patient On (Oxygen Delivery Method): room air      I&O's Detail    26 Mar 2025 07:01  -  27 Mar 2025 07:00  --------------------------------------------------------  IN:  Total IN: 0 mL    OUT:    Voided (mL): 1100 mL  Total OUT: 1100 mL    Total NET: -1100 mL          LABS:                        14.6   9.60  )-----------( 262      ( 27 Mar 2025 07:01 )             44.8     03-26    133[L]  |  94[L]  |  51[H]  ----------------------------<  394[H]  5.1   |  21[L]  |  2.27[H]    Ca    9.0      26 Mar 2025 21:40  Phos  4.3     03-27  Mg     2.9     03-26    TPro  6.5  /  Alb  3.9  /  TBili  1.7[H]  /  DBili  x   /  AST  23  /  ALT  17  /  AlkPhos  141[H]  03-26    LIVER FUNCTIONS - ( 26 Mar 2025 14:48 )  Alb: 3.9 g/dL / Pro: 6.5 g/dL / ALK PHOS: 141 U/L / ALT: 17 U/L / AST: 23 U/L / GGT: x             Urinalysis Basic - ( 26 Mar 2025 21:40 )    Color: x / Appearance: x / SG: x / pH: x  Gluc: 394 mg/dL / Ketone: x  / Bili: x / Urobili: x   Blood: x / Protein: x / Nitrite: x   Leuk Esterase: x / RBC: x / WBC x   Sq Epi: x / Non Sq Epi: x / Bacteria: x    PHYSICAL EXAM:  General Appearance: no acute distress, NTND   Chest: airway intact, non-labored breathing  Extremities: WWP, L dorsal foot wound, +L AT/PT signals and +R AT signal    SURGERY DAILY PROGRESS NOTE    24 Hour/Overnight Events: No acute events overnight    SUBJECTIVE: Patient seen and evaluated on AM rounds.   pt states no new c/o   ------------------------------------------------------------------------------------------------------------  OBJECTIVE:  Vital Signs Last 24 Hrs  T(C): 36.4 (27 Mar 2025 04:35), Max: 37.4 (26 Mar 2025 23:52)  T(F): 97.5 (27 Mar 2025 04:35), Max: 99.3 (26 Mar 2025 23:52)  HR: 95 (27 Mar 2025 04:35) (81 - 99)  BP: 142/93 (27 Mar 2025 04:35) (134/77 - 164/89)  BP(mean): 119 (26 Mar 2025 23:52) (118 - 119)  RR: 18 (27 Mar 2025 04:35) (18 - 20)  SpO2: 98% (27 Mar 2025 04:35) (94% - 100%)    Parameters below as of 27 Mar 2025 04:35  Patient On (Oxygen Delivery Method): room air      I&O's Detail    26 Mar 2025 07:01  -  27 Mar 2025 07:00  --------------------------------------------------------  IN:  Total IN: 0 mL    OUT:    Voided (mL): 1100 mL  Total OUT: 1100 mL    Total NET: -1100 mL          LABS:                        14.6   9.60  )-----------( 262      ( 27 Mar 2025 07:01 )             44.8     03-26    133[L]  |  94[L]  |  51[H]  ----------------------------<  394[H]  5.1   |  21[L]  |  2.27[H]    Ca    9.0      26 Mar 2025 21:40  Phos  4.3     03-27  Mg     2.9     03-26    TPro  6.5  /  Alb  3.9  /  TBili  1.7[H]  /  DBili  x   /  AST  23  /  ALT  17  /  AlkPhos  141[H]  03-26    LIVER FUNCTIONS - ( 26 Mar 2025 14:48 )  Alb: 3.9 g/dL / Pro: 6.5 g/dL / ALK PHOS: 141 U/L / ALT: 17 U/L / AST: 23 U/L / GGT: x             Urinalysis Basic - ( 26 Mar 2025 21:40 )    Color: x / Appearance: x / SG: x / pH: x  Gluc: 394 mg/dL / Ketone: x  / Bili: x / Urobili: x   Blood: x / Protein: x / Nitrite: x   Leuk Esterase: x / RBC: x / WBC x   Sq Epi: x / Non Sq Epi: x / Bacteria: x    PHYSICAL EXAM:  General Appearance: no acute distress, NTND   Chest: airway intact, non-labored breathing  Extremities: WWP, L dorsal foot wound stable no acute changes    +L AT/PT signals and +R AT signal

## 2025-03-27 NOTE — PROGRESS NOTE ADULT - ASSESSMENT
69M, former smoker, PMHx HTN, HLD, DM, CKD3, CAD s/p PCI, HFrEF, afib on Xarelto, remote L CEA, and known PAD s/p recent LLE angio with PT and peroneal stent placement (1/23) presenting with c/f heart failure exacerbation. Patient reports that over the past 1 to 2 weeks he has had worsening bilateral lower extremity edema and shortness of breath worse with exertion. Vascular consulted for LLE wound. RLE mild to mod infra inguinal and LLE mild to mod infra inguinal arterial insuff w vessel calcification; Rt NIKHIL 1.14 Lt NIKHIL NC from 3/11    Rec  - no acute vascular intervention, feet WWP and motor/sensory intact  - LWC  - c/w AC and ASA   - pods consult  - f/u cards recs      Vascular [id number]     69M, former smoker, PMHx HTN, HLD, DM, CKD3, CAD s/p PCI, HFrEF, afib on Xarelto, remote L CEA, and known PAD s/p recent LLE angio with PT and peroneal stent placement (1/23) presenting with c/f heart failure exacerbation. Patient reports that over the past 1 to 2 weeks he has had worsening bilateral lower extremity edema and shortness of breath worse with exertion. Vascular consulted for LLE wound. RLE mild to mod infra inguinal and LLE mild to mod infra inguinal arterial insuff w vessel calcification; Rt NIKHIL 1.14 Lt NIKHIL NC from 3/11    Rec  - no acute vascular intervention, feet WWP and motor/sensory intact  - continue medical and cardiac optimazation   - c/w AC and ASA   - pods consult  will follow     Vascular [id number]

## 2025-03-27 NOTE — CONSULT NOTE ADULT - SUBJECTIVE AND OBJECTIVE BOX
Wound SURGERY CONSULT NOTE    HPI:  69M PMHx CAD s/p PCI, HFrEF, afib, HTN, T2DM, CKD3. Was recently admitted - ADHF, also found w/ diminshed pulse b/l feet with foot wound, seen by vasc, consistent w/ PAD, no acute surgical mgnt.   Pt returns today w/ SOB and BLE swelling, as well as left foot wound that's worsened since last admission.   Seen by nephro here, rec bumex gtt. Seen by vasc here, no acute surgical intervention, pods c/s for wound care.  (26 Mar 2025 21:04)    Wound consult requested by team to assist w/ management of skin injury.   Pt w/o c/o pain, drainage, odor, color change,  or worsening swelling. Offloading and pericare initiated upon admission as pt Increasingly sedentary 2/2 to illness. Pt is continent of urine & stool.  Appetite good.  weight loss. All questions asked and answered to pt's expressed understanding and satisfaction.    Current Diet: Diet, DASH/TLC:   Sodium & Cholesterol Restricted  1500mL Fluid Restriction ([id number])     Special Instructions for Nursin milliLiter(s) to 2000 milliLiter(s) fluid restriction (25 @ 21:21)      PAST MEDICAL & SURGICAL HISTORY:  Former smoker      CAD in native artery      Type 2 diabetes mellitus      Hyperlipidemia, unspecified hyperlipidemia type      Essential hypertension, hypertension with unspecified goal      Afib      Hematoma of leg      Stented coronary artery      CHF (congestive heart failure)      s/p Carotid Endarterectomy  left          REVIEW OF SYSTEMS:   General/ Breast/ Skin/Vasc/ Neuro/ MSK: see HPI  All other systems negative    MEDICATIONS  (STANDING):  ammonium lactate 12% Lotion 1 Application(s) Topical once  aspirin  chewable 81 milliGRAM(s) Oral daily  buMETAnide Infusion 1 mG/Hr (5 mL/Hr) IV Continuous <Continuous>  dextrose 5%. 1000 milliLiter(s) (50 mL/Hr) IV Continuous <Continuous>  dextrose 5%. 1000 milliLiter(s) (100 mL/Hr) IV Continuous <Continuous>  dextrose 50% Injectable 25 Gram(s) IV Push once  dextrose 50% Injectable 12.5 Gram(s) IV Push once  dextrose 50% Injectable 25 Gram(s) IV Push once  glucagon  Injectable 1 milliGRAM(s) IntraMuscular once  insulin glargine Injectable (LANTUS) 15 Unit(s) SubCutaneous at bedtime  insulin lispro (ADMELOG) corrective regimen sliding scale   SubCutaneous three times a day before meals  insulin lispro (ADMELOG) corrective regimen sliding scale   SubCutaneous at bedtime  insulin lispro Injectable (ADMELOG) 8 Unit(s) SubCutaneous three times a day before meals  isosorbide   dinitrate Tablet (ISORDIL) 10 milliGRAM(s) Oral three times a day  metoprolol succinate ER 75 milliGRAM(s) Oral daily  rivaroxaban 15 milliGRAM(s) Oral with dinner  sodium bicarbonate 650 milliGRAM(s) Oral three times a day  spironolactone 25 milliGRAM(s) Oral daily    MEDICATIONS  (PRN):  acetaminophen     Tablet .. 650 milliGRAM(s) Oral every 6 hours PRN Temp greater or equal to 38C (100.4F), Mild Pain (1 - 3)  aluminum hydroxide/magnesium hydroxide/simethicone Suspension 30 milliLiter(s) Oral every 4 hours PRN Dyspepsia  dextrose Oral Gel 15 Gram(s) Oral once PRN Blood Glucose LESS THAN 70 milliGRAM(s)/deciliter  melatonin 3 milliGRAM(s) Oral at bedtime PRN Insomnia  ondansetron Injectable 4 milliGRAM(s) IV Push every 8 hours PRN Nausea and/or Vomiting      Allergies    atorvastatin (Muscle Pain (Severe))    Intolerances        SOCIAL HISTORY:      No current smoking, ETOH, drugs    FAMILY HISTORY:    Family history of heart disease  father  age 71    FH: atrial fibrillation  sister      PHYSICAL EXAM:  Vital Signs Last 24 Hrs  T(C): 36.6 (27 Mar 2025 11:50), Max: 37.4 (26 Mar 2025 23:52)  T(F): 97.8 (27 Mar 2025 11:50), Max: 99.3 (26 Mar 2025 23:52)  HR: 97 (27 Mar 2025 11:50) (89 - 99)  BP: 150/72 (27 Mar 2025 11:50) (142/93 - 164/89)  BP(mean): 98 (27 Mar 2025 11:50) (98 - 119)  RR: 18 (27 Mar 2025 11:50) (18 - 18)  SpO2: 95% (27 Mar 2025 11:50) (95% - 100%)    Parameters below as of 27 Mar 2025 11:50  Patient On (Oxygen Delivery Method): room air        NAD, A&Ox3/ thin/ frail,  Versa Care P500 bed  HEENT: sclera clear, mucosa moist, throat clear, trachea midline, neck supple  Respiratory: nonlabored w/ equal chest rise  Gastrointestinal: soft NT/ND  : Deferred  Neurology:  verbal,  follows commands  Psych: calm/ appropriate  Musculoskeletal: FROM, no deformities/ contractures  Vascular: BLE equally warm, no cyanosis, clubbing, edema nor acute ischemia            BLE edema equal         BLE cool, erythema, scabs there is no blistering, no ecchymosis           no increased warmth, tenderness, induration, fluctuance, nor crepitus      BL feet refer to DPM  Skin: thin, dry, pale, frail dry stable scab on rt buttock      There is no blistering, erythema, increased warmth           No tenderness, induration, fluctuance, nor crepitus    LABS/ CULTURES/ RADIOLOGY:                        14.6   9.60  )-----------( 262      ( 27 Mar 2025 07:01 )             44.8       133  |  94  |  51  ----------------------------<  394      [25 @ 21:40]  5.1   |  21  |  2.27        Ca     9.0     [25 @ 21:40]      Mg     2.9     [25 @ 21:40]      Phos  4.3     [25 @ 06:59]    TPro  6.5  /  Alb  3.9  /  TBili  1.7  /  DBili  x   /  AST  23  /  ALT  17  /  AlkPhos  141  [25 @ 14:48]            A1C with Estimated Average Glucose Result: 8.6 % (25 @ 07:01)  A1C with Estimated Average Glucose Result: 6.5 % (24 @ 06:21)  A1C with Estimated Average Glucose Result: 6.4 % (24 @ 06:44)

## 2025-03-27 NOTE — CONSULT NOTE ADULT - ASSESSMENT
A/P:69M PMHx CAD s/p PCI, HFrEF, afib, HTN, T2DM, CKD3. Was recently admitted 1/31-2/7 ADHF, also found w/ diminshed pulse b/l feet with foot wound, seen by vasc, consistent w/ PAD, no acute surgical mgnt.   Pt returns today w/ SOB and BLE swelling, as well as left foot wound that's worsened since last admission.   Seen by nephro here, rec bumex gtt. Seen by vasc here, no acute surgical intervention, pods c/s for wound care.     Wound Consult requested to assist w/ management of  prophylactic treatment  BLE scabs    Recommendations:   Buttocks/ Sacrum  cavilon daily Lisandro/ BID and prn soiling   Moisturize intact skin w/ SWEEN cream BID  Nutrition Consult for optimization in pt w/ Increased nutritional needs            encourage high quality protein, rosalee/ prosource, MVI & Vit C to promote wound healing  Continue turning and positioning w/ offloading assistive devices as per protocol         Continue w/ attends under pads and Pericare as per protocol  Waffle Cushion to chair when oob to chair  Continue w/ low air loss pressure redistribution bed surface   Care as per medicine, will remain available as requested  If needed upon discharge f/u as outpatient at [facility] [address] [facility] [phone]  Seen and D/w team & RN  Thank you for this consult,  CK Crisostomo-BC, [facility]  [phone]  Nights/ Weekends/ Holidays please call:  General Surgery Consult pager ([phone]) for emergencies  Wound PT for multilayer leg wrapping or VAC issues (x [phone])

## 2025-03-27 NOTE — PROGRESS NOTE ADULT - ASSESSMENT
{\rtf1\eorklp55968\ansi\dgrkhjr3901\ftnbj\uc1\deff0  {\fonttbl{\f0 \fnil Segoe UI;}{\f1 \fnil \fcharset0 Segoe UI;}{\f2 \fnil Times New Navi;}}  {\colortbl ;\wdf430\masil826\qesv223 ;\red0\green0\blue0 ;\red0\green0\juig316 ;\red0\green0\blue0 ;}  {\stylesheet{\f0\fs20 Normal;}{\cs1 Default Paragraph Font;}{\cs2\f0\fs16 Line Number;}{\cs3\f2\fs24\ul\cf3 Hyperlink;}}  {\*\revtbl{Unknown;}}  \layszn35634\reuswo63928\tvxiv3916\cgbll6119\suepe6057\ufypc2012\elrgmum533\esglgxo031\nogrowautofit\khwkod641\formshade\nofeaturethrottle1\dntblnsbdb\fet4\aendnotes\aftnnrlc\pgbrdrhead\pgbrdrfoot  \sectd\ebjqye47408\ygddkv63583\guttersxn0\jvoitygx9453\qycuzsql6650\tquvkmvb6743\slhblpup3587\rcbiupt905\tlqitax501\sbkpage\pgncont\pgndec  \plain\plain\f0\fs24\ql\plain\f0\fs24\plain\f1\fs16\qekn6990\hich\f1\dbch\f1\loch\f1\cf2\fs16\par  \'b7  {\*\bkmkstart [id number]}{\*\bkmkend nh79271107601}Problem: {\*\bkmkstart [id number]}{\*\bkmkend mn93100123614}Acute decompensated heart failure. :  questionable compliance \par  \'b7  {\*\bkmkstart [id number]}{\*\bkmkend rn26966572954}Plan: {\*\bkmkstart iv71770851230}{\*\bkmkend fp44159575884}-c/w bumex gtt. \par  \par  \par  \'b7  {\*\bkmkstart lk45528405411}{\*\bkmkend ah06174191450}Problem: {\*\bkmkstart sy60267967348}{\*\bkmkend gg11633607258}Wound of left foot\plain\f1\fs16\beyd6932\hich\f1\dbch\f1\loch\f1\cf2\fs16\strike\plain\f1\fs16\zuqq2457\hich\f1\dbch\f1\loch\f1\cf2\fs16   : Left dorsal foot ulceration: Stable, noninfected, present on admission\par  local wound care per pod \par  \par  \par  \'b7  {\*\bkmkstart [id number]}{\*\bkmkend yf06329242381}Problem: {\*\bkmkstart oh17803584723}{\*\bkmkend lg01666914463}Type 2 diabetes mellitus.\plain\f1\fs16\bjad3486\hich\f1\dbch\f1\loch\f1\cf2\fs16\strike\plain\f1\fs16\yrbw9821\hich\f1\dbch\f1\loch\f1\cf2\fs16   : uncontrolled  A1c  worse than in the past hence questionable complaince \par  endo consulted \par  \par  \'b7  {\*\bkmkstart vp15901956456}{\*\bkmkend cf32773309090}Problem: {\*\bkmkstart xv10873390232}{\*\bkmkend sa87373840729}Afib.\plain\f1\fs16\cyth1439\hich\f1\dbch\f1\loch\f1\cf2\fs16\strike\plain\f1\fs16\fsrz3969\hich\f1\dbch\f1\loch\f1\cf2\fs16\par  \'b7  {\*\bkmkstart gd11523217558}{\*\bkmkend ue11350538911}Plan: {\*\bkmkstart ug20928025407}{\*\bkmkend gq39123433138}-c/w xarelto and toprol.\plain\f1\fs16\kvqc6386\hich\f1\dbch\f1\loch\f1\cf2\fs16\strike\plain\f1\fs16\fbns7397\hich\f1\dbch\f1\loch\f1\cf2\fs16\par  \par  \'b7  {\*\bkmkstart oq23835772910}{\*\bkmkend vg46593078300}Problem: {\*\bkmkstart vp29892491204}{\*\bkmkend pg42171383937}CAD (coronary artery disease).\plain\f1\fs16\vfab5191\hich\f1\dbch\f1\loch\f1\cf2\fs16\strike\plain\f1\fs16\hntj3502\hich\f1\dbch\f1\loch\f1\cf2\fs16\par  \'b7  {\*\bkmkstart if99687786131}{\*\bkmkend nt01940612996}Plan: {\*\bkmkstart ur17051363296}{\*\bkmkend md27745204088}-c/w asa 81mg\par  \plain\f1\fs16\tuhz2915\hich\f1\dbch\f1\loch\f1\cf2\fs16\strike\plain\f1\fs16\hoxv9378\hich\f1\dbch\f1\loch\f1\cf2\fs16\par  \par   {\*\bkmkstart ff00476665546}{\*\bkmkend nt53974195510}Stage 4 chronic kidney disease. \par  \'b7  {\*\bkmkstart tj15303163016}{\*\bkmkend jm86723460168}Plan: {\*\bkmkstart mw16604080711}{\*\bkmkend ot91216678677}-stable. eval by nephro. \par  -dose meds per renal fx.\par  \plain\f0\fs20\mipf4996\hich\f0\dbch\f0\loch\f0\fs20\par  }

## 2025-03-27 NOTE — PROGRESS NOTE ADULT - PROBLEM SELECTOR PLAN 2
RAFA Calle MD have participated in the daily care of this patient  and have seen and examined the patient today and agree with  the  evaluation, assessment and plan of the surgical house officer  RAFA Calle MD have personally seen and examined the patient at bedside today at 8 am

## 2025-03-27 NOTE — PROGRESS NOTE ADULT - PROBLEM SELECTOR PLAN 1
RAFA Calle MD have participated in the daily care of this patient  and have seen and examined the patient today and agree with  the  evaluation, assessment and plan of the surgical house officer  RAFA Calle MD have personally seen and examined the patient at bedside today at 8 am No

## 2025-03-28 NOTE — PROGRESS NOTE ADULT - SUBJECTIVE AND OBJECTIVE BOX
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

## 2025-03-28 NOTE — DIETITIAN INITIAL EVALUATION ADULT - ORAL INTAKE PTA/DIET HISTORY
lives with spouse and mother in law, eggs, toast, fruit for breakfast, skips lunch, salad for dinner. snacks on whatever is around the house -he could not be specific.

## 2025-03-28 NOTE — PROGRESS NOTE ADULT - SUBJECTIVE AND OBJECTIVE BOX
NEPHROLOGY     Patient seen and examined sitting in chair, comfortable on room air, reports feeling weak, denies pain, no sob, in no acute distress.     MEDICATIONS  (STANDING):  aspirin  chewable 81 milliGRAM(s) Oral daily  buMETAnide Infusion 1 mG/Hr (5 mL/Hr) IV Continuous <Continuous>  dextrose 5%. 1000 milliLiter(s) (100 mL/Hr) IV Continuous <Continuous>  dextrose 5%. 1000 milliLiter(s) (50 mL/Hr) IV Continuous <Continuous>  dextrose 50% Injectable 25 Gram(s) IV Push once  dextrose 50% Injectable 12.5 Gram(s) IV Push once  dextrose 50% Injectable 25 Gram(s) IV Push once  glucagon  Injectable 1 milliGRAM(s) IntraMuscular once  insulin glargine Injectable (LANTUS) 15 Unit(s) SubCutaneous at bedtime  insulin lispro (ADMELOG) corrective regimen sliding scale   SubCutaneous three times a day before meals  insulin lispro (ADMELOG) corrective regimen sliding scale   SubCutaneous at bedtime  insulin lispro Injectable (ADMELOG) 8 Unit(s) SubCutaneous three times a day before meals  isosorbide   dinitrate Tablet (ISORDIL) 10 milliGRAM(s) Oral three times a day  metoprolol succinate ER 75 milliGRAM(s) Oral daily  rivaroxaban 15 milliGRAM(s) Oral with dinner  sodium bicarbonate 650 milliGRAM(s) Oral three times a day  spironolactone 25 milliGRAM(s) Oral daily    VITALS:  T(C): , Max: 36.7 (03-27-25 @ 20:44)  T(F): , Max: 98 (03-27-25 @ 20:44)  HR: 97 (03-28-25 @ 11:27)  BP: 136/83 (03-28-25 @ 11:27)  BP(mean): 101 (03-28-25 @ 11:27)  RR: 18 (03-28-25 @ 11:27)  SpO2: 97% (03-28-25 @ 11:27)  Wt(kg): --  I and O's:    03-27 @ 07:01  -  03-28 @ 07:00  --------------------------------------------------------  IN: 1960 mL / OUT: 3890 mL / NET: -1930 mL    03-28 @ 07:01  -  03-28 @ 13:03  --------------------------------------------------------  IN: 0 mL / OUT: 900 mL / NET: -900 mL    PHYSICAL EXAM:  Constitutional: NAD, Alert  HEENT: NCAT, MMM  Neck: Supple, No JVD  Respiratory: CTA-b/l  Cardiovascular: RRR s1s2, no m/r/g  Gastrointestinal: BS+, soft, NT/ND  Extremities: 1-2+ b/l LE edema  Neurological: no focal deficits; strength grossly intact  Back: no CVAT b/l  Skin: No rashes, no nevi    LABS:                        16.4   10.79 )-----------( 301      ( 28 Mar 2025 07:11 )             49.3     03-28    136  |  91[L]  |  61[H]  ----------------------------<  192[H]  3.3[L]   |  23  |  2.33[H]    Ca    9.7      28 Mar 2025 07:11  Phos  4.3     03-27  Mg     2.8     03-28    TPro  7.1  /  Alb  4.0  /  TBili  1.5[H]  /  DBili  x   /  AST  24  /  ALT  15  /  AlkPhos  175[H]  03-28

## 2025-03-28 NOTE — PROGRESS NOTE ADULT - PROBLEM SELECTOR PLAN 2
RAFA Calle MD have participated in the daily care of this patient  and have seen and examined the patient today and agree with  the  evaluation, assessment and plan of the surgical house officer  RAFA Calle MD have personally seen and examined the patient at bedside today at  5 pm

## 2025-03-28 NOTE — PROGRESS NOTE ADULT - ASSESSMENT
69M, former smoker, PMHx HTN, HLD, DM, CKD3, CAD s/p PCI, HFrEF, afib on Xarelto, remote L CEA, and known PAD s/p recent LLE angio with PT and peroneal stent placement (1/23) presenting with c/f heart failure exacerbation. Patient reports that over the past 1 to 2 weeks he has had worsening bilateral lower extremity edema and shortness of breath worse with exertion. Vascular consulted for LLE wound. RLE mild to mod infra inguinal and LLE mild to mod infra inguinal arterial insuff w vessel calcification; Rt NIKHIL 1.14 Lt NIKHIL NC from 3/11    Rec  - no acute vascular intervention, feet WWP and motor/sensory intact  - continue medical and cardiac optimazation   - c/w AC and ASA   - local wound care per pods; appreciate pods recs     Vascular [id number]   69M, former smoker, PMHx HTN, HLD, DM, CKD3, CAD s/p PCI, HFrEF, afib on Xarelto, remote L CEA, and known PAD s/p recent LLE angio with PT and peroneal stent placement (1/23) presenting with c/f heart failure exacerbation. Patient reports that over the past 1 to 2 weeks he has had worsening bilateral lower extremity edema and shortness of breath worse with exertion. Vascular consulted for LLE wound. RLE mild to mod infra inguinal and LLE mild to mod infra inguinal arterial insuff w vessel calcification; Rt NIKHIL 1.14 Lt NIKHIL NC from 3/11    Rec  - no acute vascular intervention, feet WWP and motor/sensory intact  - continue medical and cardiac optimazation   - c/w AC and ASA   - local wound care per pods; appreciate pods recs   - dressing changing by nursing  - Vascular Surgery to sign off at this time, please reconsult as needed     Vascular [id number]   69M, former smoker, PMHx HTN, HLD, DM, CKD3, CAD s/p PCI, HFrEF, afib on Xarelto, remote L CEA, and known PAD s/p recent LLE angio with PT and peroneal stent placement (1/23) presenting with c/f heart failure exacerbation. Patient reports that over the past 1 to 2 weeks he has had worsening bilateral lower extremity edema and shortness of breath worse with exertion. Vascular consulted for LLE wound. RLE mild to mod infra inguinal and LLE mild to mod infra inguinal arterial insuff w vessel calcification; Rt NIKHIL 1.14 Lt NIKHIL NC from 3/11    Rec  - no acute vascular intervention, feet WWP and motor/sensory intact  - continue medical and cardiac optimazation   - c/w AC and ASA   - local wound care per pods; appreciate pods recs   - dressing changing by nursing  - Vascular Surgery to sign off at this time, please reconsult as needed   d/w pt and wife outpt f/u      Vascular [id number]

## 2025-03-28 NOTE — DIETITIAN INITIAL EVALUATION ADULT - PERTINENT LABORATORY DATA
03-28    136  |  91[L]  |  61[H]  ----------------------------<  192[H]  3.3[L]   |  23  |  2.33[H]    Ca    9.7      28 Mar 2025 07:11  Phos  4.3     03-27  Mg     2.8     03-28    TPro  7.1  /  Alb  4.0  /  TBili  1.5[H]  /  DBili  x   /  AST  24  /  ALT  15  /  AlkPhos  175[H]  03-28  POCT Blood Glucose.: 205 mg/dL (03-28-25 @ 08:40)  A1C with Estimated Average Glucose Result: 8.6 % (03-27-25 @ 07:01)  A1C with Estimated Average Glucose Result: 6.5 % (12-05-24 @ 06:21)  A1C with Estimated Average Glucose Result: 6.4 % (12-04-24 @ 06:44)

## 2025-03-28 NOTE — PROGRESS NOTE ADULT - SUBJECTIVE AND OBJECTIVE BOX
SURGERY DAILY PROGRESS NOTE    24 Hour/Overnight Events: No acute events overnight    SUBJECTIVE: Patient seen and evaluated on AM rounds.  ------------------------------------------------------------------------------------------------------------  OBJECTIVE:  Vital Signs Last 24 Hrs  T(C): 36.6 (28 Mar 2025 04:10), Max: 36.7 (27 Mar 2025 20:44)  T(F): 97.8 (28 Mar 2025 04:10), Max: 98 (27 Mar 2025 20:44)  HR: 95 (28 Mar 2025 04:10) (87 - 97)  BP: 144/85 (28 Mar 2025 04:10) (144/85 - 156/88)  BP(mean): 98 (27 Mar 2025 11:50) (98 - 98)  RR: 18 (28 Mar 2025 04:10) (18 - 18)  SpO2: 97% (28 Mar 2025 04:10) (95% - 97%)    Parameters below as of 28 Mar 2025 04:10  Patient On (Oxygen Delivery Method): room air      I&O's Detail    27 Mar 2025 07:01  -  28 Mar 2025 07:00  --------------------------------------------------------  IN:    Oral Fluid: 1960 mL  Total IN: 1960 mL    OUT:    Voided (mL): 3890 mL  Total OUT: 3890 mL    Total NET: -1930 mL          LABS:                        14.6   9.60  )-----------( 262      ( 27 Mar 2025 07:01 )             44.8     03-26    133[L]  |  94[L]  |  51[H]  ----------------------------<  394[H]  5.1   |  21[L]  |  2.27[H]    Ca    9.0      26 Mar 2025 21:40  Phos  4.3     03-27  Mg     2.9     03-26    TPro  6.5  /  Alb  3.9  /  TBili  1.7[H]  /  DBili  x   /  AST  23  /  ALT  17  /  AlkPhos  141[H]  03-26    LIVER FUNCTIONS - ( 26 Mar 2025 14:48 )  Alb: 3.9 g/dL / Pro: 6.5 g/dL / ALK PHOS: 141 U/L / ALT: 17 U/L / AST: 23 U/L / GGT: x             Urinalysis Basic - ( 26 Mar 2025 21:40 )    Color: x / Appearance: x / SG: x / pH: x  Gluc: 394 mg/dL / Ketone: x  / Bili: x / Urobili: x   Blood: x / Protein: x / Nitrite: x   Leuk Esterase: x / RBC: x / WBC x   Sq Epi: x / Non Sq Epi: x / Bacteria: x    PHYSICAL EXAM:  General Appearance: no acute distress, NTND   Chest: airway intact, non-labored breathing  Extremities: WWP, L dorsal foot wound stable no acute changes    +L AT/PT signals and +R AT signal  SURGERY DAILY PROGRESS NOTE    24 Hour/Overnight Events: No acute events overnight    SUBJECTIVE: Patient seen and evaluated on AM rounds.  ------------------------------------------------------------------------------------------------------------  OBJECTIVE:  Vital Signs Last 24 Hrs  T(C): 36.6 (28 Mar 2025 04:10), Max: 36.7 (27 Mar 2025 20:44)  T(F): 97.8 (28 Mar 2025 04:10), Max: 98 (27 Mar 2025 20:44)  HR: 95 (28 Mar 2025 04:10) (87 - 97)  BP: 144/85 (28 Mar 2025 04:10) (144/85 - 156/88)  BP(mean): 98 (27 Mar 2025 11:50) (98 - 98)  RR: 18 (28 Mar 2025 04:10) (18 - 18)  SpO2: 97% (28 Mar 2025 04:10) (95% - 97%)    Parameters below as of 28 Mar 2025 04:10  Patient On (Oxygen Delivery Method): room air      I&O's Detail    27 Mar 2025 07:01  -  28 Mar 2025 07:00  --------------------------------------------------------  IN:    Oral Fluid: 1960 mL  Total IN: 1960 mL    OUT:    Voided (mL): 3890 mL  Total OUT: 3890 mL    Total NET: -1930 mL          LABS:                        14.6   9.60  )-----------( 262      ( 27 Mar 2025 07:01 )             44.8     03-26    133[L]  |  94[L]  |  51[H]  ----------------------------<  394[H]  5.1   |  21[L]  |  2.27[H]    Ca    9.0      26 Mar 2025 21:40  Phos  4.3     03-27  Mg     2.9     03-26    TPro  6.5  /  Alb  3.9  /  TBili  1.7[H]  /  DBili  x   /  AST  23  /  ALT  17  /  AlkPhos  141[H]  03-26    LIVER FUNCTIONS - ( 26 Mar 2025 14:48 )  Alb: 3.9 g/dL / Pro: 6.5 g/dL / ALK PHOS: 141 U/L / ALT: 17 U/L / AST: 23 U/L / GGT: x             Urinalysis Basic - ( 26 Mar 2025 21:40 )    Color: x / Appearance: x / SG: x / pH: x  Gluc: 394 mg/dL / Ketone: x  / Bili: x / Urobili: x   Blood: x / Protein: x / Nitrite: x   Leuk Esterase: x / RBC: x / WBC x   Sq Epi: x / Non Sq Epi: x / Bacteria: x    PHYSICAL EXAM:  General Appearance: no acute distress, NTND   Chest: airway intact, non-labored breathing  Extremities: WWP, L dorsal foot wound stable no acute changes    +L AT/PT signals and +R AT signal

## 2025-03-28 NOTE — PROGRESS NOTE ADULT - ASSESSMENT
·  Problem: Acute decompensated heart failure. :  questionable compliance   ·  Plan: -c/w bumex gtt.   monitor Cr         ·  Problem: Wound of left foot: Left dorsal foot ulceration: Stable, noninfected, present on admission  local wound care per pod       ·  Problem: Type 2 diabetes mellitus.: uncontrolled  A1c  worse than in the past hence questionable complaince   endo consulted     ·  Problem: Afib.  ·  Plan: -c/w xarelto and toprol.    ·  Problem: CAD (coronary artery disease).  ·  Plan: -c/w asa 81mg       Stage 4 chronic kidney disease.   ·  Plan: -stable. eval by nephro.   -dose meds per renal fx.

## 2025-03-28 NOTE — DIETITIAN NUTRITION RISK NOTIFICATION - TREATMENT: THE FOLLOWING DIET HAS BEEN RECOMMENDED
Diet, DASH/TLC:   Sodium & Cholesterol Restricted  1500mL Fluid Restriction ([id number])     Special Instructions for Nursin milliLiter(s) to 2000 milliLiter(s) fluid restriction (25 @ 21:21) [Active]       1000

## 2025-03-28 NOTE — DIETITIAN INITIAL EVALUATION ADULT - NS FNS DIET ORDER
Diet, DASH/TLC:   Sodium & Cholesterol Restricted  1500mL Fluid Restriction ([id number])     Special Instructions for Nursin milliLiter(s) to 2000 milliLiter(s) fluid restriction (25 @ 21:21) [Active]

## 2025-03-28 NOTE — DIETITIAN INITIAL EVALUATION ADULT - PROBLEM SELECTOR PLAN 2
-podiatry consult in the morning  -no obvious s/s of infection and no leukocytosis.   -if develops fever, start vancomycin.   -podiatry consult in the morning

## 2025-03-28 NOTE — PROGRESS NOTE ADULT - ASSESSMENT
IMPRESSION: 70M w/ HTN, DM2, CAD, HFrEF, and CKD4, 3/26/25 p/w ADHF    (1)CKD - 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 - at/near baseline  (2)Hypokalemia - due to diuresis - supplemented   (3)CV - acute on chronic HFrEF - on Bumex gtt    RECOMMEND:  (1)Bumex 1mg/h gtt  (2)K+ supplemented   (3)Continue strict I/Os  (4)Dose new meds for GFR 20-30ml/min  (5)BMP+Mg at least daily    Dleilah Foy, [facility]  [phone]

## 2025-03-28 NOTE — DIETITIAN INITIAL EVALUATION ADULT - PROBLEM SELECTOR PROBLEM 5
Nucala 100mg SQ right lateral thigh. Tolerated well. Patient supplied. Patient education completed and patient verbalized understanding.     CAD (coronary artery disease)

## 2025-03-28 NOTE — DIETITIAN INITIAL EVALUATION ADULT - PERTINENT MEDS FT
MEDICATIONS  (STANDING):  aspirin  chewable 81 milliGRAM(s) Oral daily  buMETAnide Infusion 1 mG/Hr (5 mL/Hr) IV Continuous <Continuous>  dextrose 5%. 1000 milliLiter(s) (50 mL/Hr) IV Continuous <Continuous>  dextrose 5%. 1000 milliLiter(s) (100 mL/Hr) IV Continuous <Continuous>  dextrose 50% Injectable 25 Gram(s) IV Push once  dextrose 50% Injectable 12.5 Gram(s) IV Push once  dextrose 50% Injectable 25 Gram(s) IV Push once  glucagon  Injectable 1 milliGRAM(s) IntraMuscular once  insulin glargine Injectable (LANTUS) 15 Unit(s) SubCutaneous at bedtime  insulin lispro (ADMELOG) corrective regimen sliding scale   SubCutaneous three times a day before meals  insulin lispro (ADMELOG) corrective regimen sliding scale   SubCutaneous at bedtime  insulin lispro Injectable (ADMELOG) 8 Unit(s) SubCutaneous three times a day before meals  isosorbide   dinitrate Tablet (ISORDIL) 10 milliGRAM(s) Oral three times a day  metoprolol succinate ER 75 milliGRAM(s) Oral daily  rivaroxaban 15 milliGRAM(s) Oral with dinner  sodium bicarbonate 650 milliGRAM(s) Oral three times a day  spironolactone 25 milliGRAM(s) Oral daily    MEDICATIONS  (PRN):  acetaminophen     Tablet .. 650 milliGRAM(s) Oral every 6 hours PRN Temp greater or equal to 38C (100.4F), Mild Pain (1 - 3)  aluminum hydroxide/magnesium hydroxide/simethicone Suspension 30 milliLiter(s) Oral every 4 hours PRN Dyspepsia  dextrose Oral Gel 15 Gram(s) Oral once PRN Blood Glucose LESS THAN 70 milliGRAM(s)/deciliter  melatonin 3 milliGRAM(s) Oral at bedtime PRN Insomnia  ondansetron Injectable 4 milliGRAM(s) IV Push every 8 hours PRN Nausea and/or Vomiting

## 2025-03-28 NOTE — CHART NOTE - NSCHARTNOTEFT_GEN_A_CORE
pt c/o moderate chronic LE pain 6/10, not relieved with Tylenol  __ Oxy IR 2.5 PRN q 6 hrs for pain ordered  - CTM

## 2025-03-28 NOTE — CONSULT NOTE ADULT - SUBJECTIVE AND OBJECTIVE BOX
HPI:  69M PMHx CAD s/p PCI, HFrEF, afib, HTN, T2DM, CKD3. Was recently admitted - ADHF, also found w/ diminshed pulse b/l feet with foot wound, seen by vasc, consistent w/ PAD, no acute surgical mgnt.   Pt returns today w/ SOB and BLE swelling, as well as left foot wound that's worsened since last admission.   Seen by nephro here, rec bumex gtt.   Seen by vasc here, no acute surgical intervention, pods c/s for wound care.  (26 Mar 2025 21:04)  Patient has history of diabetes, was on oral diabetes medications at home, no recent hypoglycemic episodes. Patient follows up with ENDO, PCP for health diabetes management.    PAST MEDICAL & SURGICAL HISTORY:  Former smoker      CAD in native artery      Type 2 diabetes mellitus      Hyperlipidemia, unspecified hyperlipidemia type      Essential hypertension, hypertension with unspecified goal      Afib      Hematoma of leg      Stented coronary artery      CHF (congestive heart failure)      s/p Carotid Endarterectomy  left          FAMILY HISTORY:  Family history of heart disease  father  age 71    FH: atrial fibrillation  sister        Social History:    Outpatient Medications:    MEDICATIONS  (STANDING):  aspirin  chewable 81 milliGRAM(s) Oral daily  buMETAnide Infusion 1 mG/Hr (5 mL/Hr) IV Continuous <Continuous>  dextrose 5%. 1000 milliLiter(s) (100 mL/Hr) IV Continuous <Continuous>  dextrose 5%. 1000 milliLiter(s) (50 mL/Hr) IV Continuous <Continuous>  dextrose 50% Injectable 25 Gram(s) IV Push once  dextrose 50% Injectable 12.5 Gram(s) IV Push once  dextrose 50% Injectable 25 Gram(s) IV Push once  glucagon  Injectable 1 milliGRAM(s) IntraMuscular once  insulin glargine Injectable (LANTUS) 17 Unit(s) SubCutaneous at bedtime  insulin lispro (ADMELOG) corrective regimen sliding scale   SubCutaneous three times a day before meals  insulin lispro (ADMELOG) corrective regimen sliding scale   SubCutaneous at bedtime  insulin lispro Injectable (ADMELOG) 8 Unit(s) SubCutaneous three times a day before meals  isosorbide   dinitrate Tablet (ISORDIL) 10 milliGRAM(s) Oral three times a day  metoprolol succinate ER 75 milliGRAM(s) Oral daily  rivaroxaban 15 milliGRAM(s) Oral with dinner  sodium bicarbonate 650 milliGRAM(s) Oral three times a day  spironolactone 25 milliGRAM(s) Oral daily    MEDICATIONS  (PRN):  acetaminophen     Tablet .. 650 milliGRAM(s) Oral every 6 hours PRN Temp greater or equal to 38C (100.4F), Mild Pain (1 - 3)  aluminum hydroxide/magnesium hydroxide/simethicone Suspension 30 milliLiter(s) Oral every 4 hours PRN Dyspepsia  dextrose Oral Gel 15 Gram(s) Oral once PRN Blood Glucose LESS THAN 70 milliGRAM(s)/deciliter  melatonin 3 milliGRAM(s) Oral at bedtime PRN Insomnia  ondansetron Injectable 4 milliGRAM(s) IV Push every 8 hours PRN Nausea and/or Vomiting      Allergies    atorvastatin (Muscle Pain (Severe))    Intolerances        ALL  SYSTEMS REVIEWED.    PHYSICAL EXAM:  VITALS: T(C): 36.6 (25 @ 11:27)  T(F): 97.9 (25 @ 11:27), Max: 98 (25 @ 20:44)  HR: 97 (25 @ 11:27) (87 - 97)  BP: 136/83 (25 @ 11:27) (136/83 - 156/88)  RR:  (18 - 18)  SpO2:  (96% - 97%)  Wt(kg): --  THYROID: Normal size, no palpable nodules  RESPIRATORY: Clear to auscultation bilaterally.  CARDIOVASCULAR: Si S2, No murmurs;  GI: Soft, non distended.      POCT Blood Glucose.: 321 mg/dL (25 @ 12:38)  POCT Blood Glucose.: 205 mg/dL (25 @ 08:40)  POCT Blood Glucose.: 112 mg/dL (25 @ 21:36)  POCT Blood Glucose.: 154 mg/dL (25 @ 17:21)  POCT Blood Glucose.: 349 mg/dL (25 @ 13:07)  POCT Blood Glucose.: 375 mg/dL (25 @ 12:38)  POCT Blood Glucose.: 221 mg/dL (25 @ 08:45)  POCT Blood Glucose.: 321 mg/dL (25 @ 03:09)  POCT Blood Glucose.: 293 mg/dL (25 @ 00:29)                            16.4   10.79 )-----------( 301      ( 28 Mar 2025 07:11 )             49.3           136  |  91[L]  |  61[H]  ----------------------------<  192[H]  3.3[L]   |  23  |  2.33[H]    eGFR: 30[L]    Ca    9.7        Mg     2.8       Phos  4.3         TPro  7.1  /  Alb  4.0  /  TBili  1.5[H]  /  DBili  x   /  AST  24  /  ALT  15  /  AlkPhos  175[H]        Thyroid Function Tests:       Chol 116 Direct LDL -- LDL calculated 53 HDL 46 Trig 83    Radiology:

## 2025-03-28 NOTE — DIETITIAN INITIAL EVALUATION ADULT - PROBLEM SELECTOR PLAN 1
-c/w bumex gtt.   -strict i/o   -consult HF tmr.   -appreciate nephro c/s  -c/w isordil, asa, toprol, aldactone  TTE: 1/20/25  LVEF 40-45%, mild MR and AR.

## 2025-03-28 NOTE — PROGRESS NOTE ADULT - SUBJECTIVE AND OBJECTIVE BOX
Date of service: 25 @ 14:27      Patient is a 70y old  Male who presents with a chief complaint of Other disorders of soft tissue     (28 Mar 2025 10:54)                                                               INTERVAL HPI/OVERNIGHT EVENTS:    REVIEW OF SYSTEMS:   no complaints     MEDICATIONS  (STANDING):  aspirin  chewable 81 milliGRAM(s) Oral daily  buMETAnide Infusion 1 mG/Hr (5 mL/Hr) IV Continuous <Continuous>  dextrose 5%. 1000 milliLiter(s) (50 mL/Hr) IV Continuous <Continuous>  dextrose 5%. 1000 milliLiter(s) (100 mL/Hr) IV Continuous <Continuous>  dextrose 50% Injectable 25 Gram(s) IV Push once  dextrose 50% Injectable 12.5 Gram(s) IV Push once  dextrose 50% Injectable 25 Gram(s) IV Push once  glucagon  Injectable 1 milliGRAM(s) IntraMuscular once  insulin glargine Injectable (LANTUS) 17 Unit(s) SubCutaneous at bedtime  insulin lispro (ADMELOG) corrective regimen sliding scale   SubCutaneous three times a day before meals  insulin lispro (ADMELOG) corrective regimen sliding scale   SubCutaneous at bedtime  insulin lispro Injectable (ADMELOG) 8 Unit(s) SubCutaneous three times a day before meals  isosorbide   dinitrate Tablet (ISORDIL) 10 milliGRAM(s) Oral three times a day  metoprolol succinate ER 75 milliGRAM(s) Oral daily  rivaroxaban 15 milliGRAM(s) Oral with dinner  sodium bicarbonate 650 milliGRAM(s) Oral three times a day  spironolactone 25 milliGRAM(s) Oral daily    MEDICATIONS  (PRN):  acetaminophen     Tablet .. 650 milliGRAM(s) Oral every 6 hours PRN Temp greater or equal to 38C (100.4F), Mild Pain (1 - 3)  aluminum hydroxide/magnesium hydroxide/simethicone Suspension 30 milliLiter(s) Oral every 4 hours PRN Dyspepsia  dextrose Oral Gel 15 Gram(s) Oral once PRN Blood Glucose LESS THAN 70 milliGRAM(s)/deciliter  melatonin 3 milliGRAM(s) Oral at bedtime PRN Insomnia  ondansetron Injectable 4 milliGRAM(s) IV Push every 8 hours PRN Nausea and/or Vomiting       Allergies    atorvastatin (Muscle Pain (Severe))    Intolerances      Vital Signs Last 24 Hrs  T(C): 36.6 (28 Mar 2025 11:27), Max: 36.7 (27 Mar 2025 20:44)  T(F): 97.9 (28 Mar 2025 11:27), Max: 98 (27 Mar 2025 20:44)  HR: 97 (28 Mar 2025 11:27) (87 - 97)  BP: 136/83 (28 Mar 2025 11:27) (136/83 - 156/88)  BP(mean): 101 (28 Mar 2025 11:27) (101 - 101)  RR: 18 (28 Mar 2025 11:27) (18 - 18)  SpO2: 97% (28 Mar 2025 11:27) (96% - 97%)    Parameters below as of 28 Mar 2025 11:27  Patient On (Oxygen Delivery Method): room air      CAPILLARY BLOOD GLUCOSE      POCT Blood Glucose.: 321 mg/dL (28 Mar 2025 12:38)  POCT Blood Glucose.: 205 mg/dL (28 Mar 2025 08:40)  POCT Blood Glucose.: 112 mg/dL (27 Mar 2025 21:36)  POCT Blood Glucose.: 154 mg/dL (27 Mar 2025 17:21)       @ 07: @ 07:00  --------------------------------------------------------  IN: 1960 mL / OUT: 3890 mL / NET: -1930 mL     @ 07: @ 14:27  --------------------------------------------------------  IN: 0 mL / OUT: 900 mL / NET: -900 mL      Physical Exam:    Daily     Daily Weight in k.7 (28 Mar 2025 04:10)  General:   cachetic  HEENT:  Nonicteric, PERRLA  CV:  RRR, S1S2   Lungs:  CTA B/L, no wheezes, rales, rhonchi  Abdomen:  Soft, non-tender, no distended, positive BS  Extremities:no edema                                                                                                                                                                                                                                                                               LABS:                               16.4   10.79 )-----------( 301      ( 28 Mar 2025 07:11 )             49.3                          136  |  91[L]  |  61[H]  ----------------------------<  192[H]  3.3[L]   |  23  |  2.33[H]    Ca    9.7      28 Mar 2025 07:11  Phos  4.3       Mg     2.8         TPro  7.1  /  Alb  4.0  /  TBili  1.5[H]  /  DBili  x   /  AST  24  /  ALT  15  /  AlkPhos  175[H]                         RADIOLOGY & ADDITIONAL TESTS         I personally reviewed: [  ]EKG   [  ]CXR    [  ] CT      A/P:         Discussed with :     Derek consultants' Notes   Time spent :

## 2025-03-28 NOTE — CONSULT NOTE ADULT - ASSESSMENT
Assessment  DMT2: 70y Male with DM T2 with hyperglycemia admitted with SOB,  patient was on oral hypoglycemic agents at home, now on  insulin coverage, blood sugars are running high, eating meals, compliant with low carb diet.  Patient is high risk with high level decision making due to uncontrolled diabetes, has increased risk for complications.   CAD: on medications, monitored, no acute events..  HTN: On antihypertensive medications, monitored, stable..          Myrna Rossi MD  Cell:  [phone]  Office: [phone]

## 2025-03-29 NOTE — PROGRESS NOTE ADULT - PROBLEM SELECTOR PLAN 1
Will continue current insulin regimen for now. Will continue monitoring  blood sugars, will Follow up.  Patient counseled for compliance with consistent low carb diet and physical activity as tolerated.  .

## 2025-03-29 NOTE — PROGRESS NOTE ADULT - ASSESSMENT
A/P     # Acute decompensated heart failure  likely noncompliance to meds at home   seen by cardio   c/w Bumex GTT   monitor I/O   daily weight     # Wound of left foot: Left dorsal foot ulceration:   Stable, noninfected, present on admission  local wound care    #Type 2 diabetes mellitus.:   uncontrolled  A1c  worse than in the past   endo following   c/w insulin / FSBS     # CAD/ Ch Afib.  -c/w xarelto and toprol.  rate controlled     # CAD (coronary artery disease).  -c/w asa 81mg    # Stage 4 chronic kidney disease.   stable renal fx   c/w bumex GTT   renal following     donna escobar MD  covering Dr. Mcdaniel

## 2025-03-29 NOTE — PROGRESS NOTE ADULT - SUBJECTIVE AND OBJECTIVE BOX
Patient is a 70y old  Male who presents with a chief complaint of Other disorders of soft tissue     (28 Mar 2025 10:54)      INTERVAL HPI/OVERNIGHT EVENTS: seen and examined, breathing better   T(C): 36.4 (03-29-25 @ 20:00), Max: 36.6 (03-29-25 @ 11:18)  HR: 96 (03-29-25 @ 20:00) (65 - 96)  BP: 139/78 (03-29-25 @ 20:00) (139/78 - 161/84)  RR: 18 (03-29-25 @ 20:00) (18 - 18)  SpO2: 96% (03-29-25 @ 20:00) (96% - 97%)  Wt(kg): --  I&O's Summary    28 Mar 2025 07:01  -  29 Mar 2025 07:00  --------------------------------------------------------  IN: 800 mL / OUT: 3250 mL / NET: -2450 mL    29 Mar 2025 07:01  -  29 Mar 2025 22:29  --------------------------------------------------------  IN: 475 mL / OUT: 1650 mL / NET: -1175 mL        PAST MEDICAL & SURGICAL HISTORY:  Former smoker      CAD in native artery      Type 2 diabetes mellitus      Hyperlipidemia, unspecified hyperlipidemia type      Essential hypertension, hypertension with unspecified goal      Afib      Hematoma of leg      Stented coronary artery      CHF (congestive heart failure)      s/p Carotid Endarterectomy  left          SOCIAL HISTORY  Alcohol:  Tobacco:  Illicit substance use:    FAMILY HISTORY:    REVIEW OF SYSTEMS:  CONSTITUTIONAL: No fever, weight loss, or fatigue  EYES: No eye pain, visual disturbances, or discharge  ENMT:  No difficulty hearing, tinnitus, vertigo; No sinus or throat pain  NECK: No pain or stiffness  RESPIRATORY: No cough, wheezing, chills or hemoptysis; No shortness of breath  CARDIOVASCULAR: No chest pain, palpitations, dizziness, or leg swelling  GASTROINTESTINAL: No abdominal or epigastric pain. No nausea, vomiting, or hematemesis; No diarrhea or constipation. No melena or hematochezia.  GENITOURINARY: No dysuria, frequency, hematuria, or incontinence  NEUROLOGICAL: No headaches, memory loss, loss of strength, numbness, or tremors  SKIN: No itching, burning, rashes, or lesions   LYMPH NODES: No enlarged glands  ENDOCRINE: No heat or cold intolerance; No hair loss  MUSCULOSKELETAL: No joint pain or swelling; No muscle, back, or extremity pain  PSYCHIATRIC: No depression, anxiety, mood swings, or difficulty sleeping  HEME/LYMPH: No easy bruising, or bleeding gums  ALLERY AND IMMUNOLOGIC: No hives or eczema    RADIOLOGY & ADDITIONAL TESTS:    Imaging Personally Reviewed:  [ ] YES  [ ] NO    Consultant(s) Notes Reviewed:  [ ] YES  [ ] NO    PHYSICAL EXAM:  GENERAL: NAD, well-groomed, well-developed  HEAD:  Atraumatic, Normocephalic  EYES: EOMI, PERRLA, conjunctiva and sclera clear  ENMT: No tonsillar erythema, exudates, or enlargement; Moist mucous membranes, Good dentition, No lesions  NECK: Supple, No JVD, Normal thyroid  NERVOUS SYSTEM:  Alert & Oriented X3, Good concentration; Motor Strength 5/5 B/L upper and lower extremities; DTRs 2+ intact and symmetric  CHEST/LUNG: Clear to percussion bilaterally; No rales, rhonchi, wheezing, or rubs  HEART: Regular rate and rhythm; No murmurs, rubs, or gallops  ABDOMEN: Soft, Nontender, Nondistended; Bowel sounds present  EXTREMITIES:  2+ Peripheral Pulses, No clubbing, cyanosis, or edema  LYMPH: No lymphadenopathy noted  SKIN: No rashes or lesions    LABS:                        15.8   10.12 )-----------( 320      ( 29 Mar 2025 07:06 )             48.2     03-29    132[L]  |  90[L]  |  71[H]  ----------------------------<  133[H]  4.4   |  23  |  2.44[H]    Ca    9.6      29 Mar 2025 07:06  Mg     2.7     03-28    TPro  7.1  /  Alb  4.0  /  TBili  1.5[H]  /  DBili  x   /  AST  24  /  ALT  15  /  AlkPhos  175[H]  03-28      Urinalysis Basic - ( 29 Mar 2025 07:06 )    Color: x / Appearance: x / SG: x / pH: x  Gluc: 133 mg/dL / Ketone: x  / Bili: x / Urobili: x   Blood: x / Protein: x / Nitrite: x   Leuk Esterase: x / RBC: x / WBC x   Sq Epi: x / Non Sq Epi: x / Bacteria: x      CAPILLARY BLOOD GLUCOSE      POCT Blood Glucose.: 157 mg/dL (29 Mar 2025 21:31)  POCT Blood Glucose.: 231 mg/dL (29 Mar 2025 17:37)  POCT Blood Glucose.: 288 mg/dL (29 Mar 2025 13:00)  POCT Blood Glucose.: 140 mg/dL (29 Mar 2025 08:28)        Urinalysis Basic - ( 29 Mar 2025 07:06 )    Color: x / Appearance: x / SG: x / pH: x  Gluc: 133 mg/dL / Ketone: x  / Bili: x / Urobili: x   Blood: x / Protein: x / Nitrite: x   Leuk Esterase: x / RBC: x / WBC x   Sq Epi: x / Non Sq Epi: x / Bacteria: x        MEDICATIONS  (STANDING):  aspirin  chewable 81 milliGRAM(s) Oral daily  buMETAnide Infusion 1 mG/Hr (5 mL/Hr) IV Continuous <Continuous>  dextrose 5%. 1000 milliLiter(s) (50 mL/Hr) IV Continuous <Continuous>  dextrose 5%. 1000 milliLiter(s) (100 mL/Hr) IV Continuous <Continuous>  dextrose 50% Injectable 25 Gram(s) IV Push once  dextrose 50% Injectable 12.5 Gram(s) IV Push once  dextrose 50% Injectable 25 Gram(s) IV Push once  glucagon  Injectable 1 milliGRAM(s) IntraMuscular once  insulin glargine Injectable (LANTUS) 17 Unit(s) SubCutaneous at bedtime  insulin lispro (ADMELOG) corrective regimen sliding scale   SubCutaneous three times a day before meals  insulin lispro (ADMELOG) corrective regimen sliding scale   SubCutaneous at bedtime  insulin lispro Injectable (ADMELOG) 8 Unit(s) SubCutaneous three times a day before meals  isosorbide   dinitrate Tablet (ISORDIL) 10 milliGRAM(s) Oral three times a day  metoprolol succinate ER 75 milliGRAM(s) Oral daily  rivaroxaban 15 milliGRAM(s) Oral with dinner  sodium bicarbonate 650 milliGRAM(s) Oral three times a day  spironolactone 25 milliGRAM(s) Oral daily    MEDICATIONS  (PRN):  acetaminophen     Tablet .. 650 milliGRAM(s) Oral every 6 hours PRN Temp greater or equal to 38C (100.4F), Mild Pain (1 - 3)  aluminum hydroxide/magnesium hydroxide/simethicone Suspension 30 milliLiter(s) Oral every 4 hours PRN Dyspepsia  dextrose Oral Gel 15 Gram(s) Oral once PRN Blood Glucose LESS THAN 70 milliGRAM(s)/deciliter  melatonin 3 milliGRAM(s) Oral at bedtime PRN Insomnia  nitroglycerin     SubLingual 0.4 milliGRAM(s) SubLingual every 5 minutes PRN Chest Pain  ondansetron Injectable 4 milliGRAM(s) IV Push every 8 hours PRN Nausea and/or Vomiting  oxyCODONE    IR 2.5 milliGRAM(s) Oral every 6 hours PRN Severe Pain (7 - 10)      Care Discussed with Consultants/Other Providers [ ] YES  [ ] NO

## 2025-03-29 NOTE — PROGRESS NOTE ADULT - SUBJECTIVE AND OBJECTIVE BOX
SUBJECTIVE: Resting comfortably.  	  MEDICATIONS:  buMETAnide Infusion 1 mG/Hr IV Continuous <Continuous>  isosorbide   dinitrate Tablet (ISORDIL) 10 milliGRAM(s) Oral three times a day  metoprolol succinate ER 75 milliGRAM(s) Oral daily  spironolactone 25 milliGRAM(s) Oral daily  acetaminophen     Tablet .. 650 milliGRAM(s) Oral every 6 hours PRN  melatonin 3 milliGRAM(s) Oral at bedtime PRN  ondansetron Injectable 4 milliGRAM(s) IV Push every 8 hours PRN  oxyCODONE    IR 2.5 milliGRAM(s) Oral every 6 hours PRN  aluminum hydroxide/magnesium hydroxide/simethicone Suspension 30 milliLiter(s) Oral every 4 hours PRN  dextrose 50% Injectable 25 Gram(s) IV Push once  dextrose 50% Injectable 12.5 Gram(s) IV Push once  dextrose 50% Injectable 25 Gram(s) IV Push once  dextrose Oral Gel 15 Gram(s) Oral once PRN  glucagon  Injectable 1 milliGRAM(s) IntraMuscular once  insulin glargine Injectable (LANTUS) 17 Unit(s) SubCutaneous at bedtime  insulin lispro (ADMELOG) corrective regimen sliding scale   SubCutaneous three times a day before meals  insulin lispro (ADMELOG) corrective regimen sliding scale   SubCutaneous at bedtime  insulin lispro Injectable (ADMELOG) 8 Unit(s) SubCutaneous three times a day before meals  aspirin  chewable 81 milliGRAM(s) Oral daily  dextrose 5%. 1000 milliLiter(s) IV Continuous <Continuous>  dextrose 5%. 1000 milliLiter(s) IV Continuous <Continuous>  rivaroxaban 15 milliGRAM(s) Oral with dinner  sodium bicarbonate 650 milliGRAM(s) Oral three times a day      REVIEW OF SYSTEMS:    CONSTITUTIONAL: No fever, weight loss, or fatigue  EYES: No eye pain, visual disturbances, or discharge  NECK: No pain or stiffness  RESPIRATORY: No cough, wheezing, chills or hemoptysis; No Shortness of Breath  CARDIOVASCULAR: No chest pain, palpitations, dizziness, or leg swelling  GASTROINTESTINAL: No abdominal or epigastric pain. No nausea, vomiting, or hematemesis; No diarrhea or constipation. No melena or hematochezia.  GENITOURINARY: No dysuria, frequency, hematuria, or incontinence  NEUROLOGICAL: No headaches, memory loss, loss of strength, numbness, or tremors  SKIN: No itching, burning, rashes, or lesions   LYMPH Nodes: No enlarged glands  MUSCULOSKELETAL: No joint pain or swelling; No muscle, back, or extremity pain  All other review of systems are negative.      PHYSICAL EXAM:  T(C): 36.4 (03-29-25 @ 04:38), Max: 36.6 (03-28-25 @ 11:27)  HR: 65 (03-29-25 @ 04:38) (65 - 97)  BP: 149/89 (03-29-25 @ 04:38) (136/83 - 152/88)  RR: 18 (03-29-25 @ 04:38) (18 - 18)  SpO2: 97% (03-29-25 @ 04:38) (96% - 97%)  Wt(kg): --  I&O's Summary    28 Mar 2025 07:01  -  29 Mar 2025 07:00  --------------------------------------------------------  IN: 800 mL / OUT: 3250 mL / NET: -2450 mL    29 Mar 2025 07:01  -  29 Mar 2025 10:16  --------------------------------------------------------  IN: 0 mL / OUT: 500 mL / NET: -500 mL          PHYSICAL EXAM    Appearance: Normal	  HEENT:   Normal oral mucosa, PERRL, EOMI	  NECK: Soft and supple, No LAD, No JVD  Cardiovascular: Regular Rate and Rhythm, Normal S1 S2, No murmurs, No clicks, gallops or rubs  Respiratory: Lungs clear to auscultation	  Extremities: No clubbing, cyanosis or edema    LABS:	 	               15.8   10.12 )-----------( 320      ( 29 Mar 2025 07:06 )             48.2     03-29    132[L]  |  90[L]  |  71[H]  ----------------------------<  133[H]  4.4   |  23  |  2.44[H]    Ca    9.6      29 Mar 2025 07:06  Mg     2.7     03-28    TPro  7.1  /  Alb  4.0  /  TBili  1.5[H]  /  DBili  x   /  AST  24  /  ALT  15  /  AlkPhos  175[H]  03-28

## 2025-03-29 NOTE — PROGRESS NOTE ADULT - ASSESSMENT
on room air   denies any acute complaints    acetaminophen     Tablet .. 650 milliGRAM(s) Oral every 6 hours PRN  aluminum hydroxide/magnesium hydroxide/simethicone Suspension 30 milliLiter(s) Oral every 4 hours PRN  aspirin  chewable 81 milliGRAM(s) Oral daily  buMETAnide Infusion 1 mG/Hr IV Continuous <Continuous>  dextrose 5%. 1000 milliLiter(s) IV Continuous <Continuous>  dextrose 5%. 1000 milliLiter(s) IV Continuous <Continuous>  dextrose 50% Injectable 25 Gram(s) IV Push once  dextrose 50% Injectable 12.5 Gram(s) IV Push once  dextrose 50% Injectable 25 Gram(s) IV Push once  dextrose Oral Gel 15 Gram(s) Oral once PRN  glucagon  Injectable 1 milliGRAM(s) IntraMuscular once  insulin glargine Injectable (LANTUS) 17 Unit(s) SubCutaneous at bedtime  insulin lispro (ADMELOG) corrective regimen sliding scale   SubCutaneous three times a day before meals  insulin lispro (ADMELOG) corrective regimen sliding scale   SubCutaneous at bedtime  insulin lispro Injectable (ADMELOG) 8 Unit(s) SubCutaneous three times a day before meals  isosorbide   dinitrate Tablet (ISORDIL) 10 milliGRAM(s) Oral three times a day  melatonin 3 milliGRAM(s) Oral at bedtime PRN  metoprolol succinate ER 75 milliGRAM(s) Oral daily  nitroglycerin     SubLingual 0.4 milliGRAM(s) SubLingual every 5 minutes PRN  ondansetron Injectable 4 milliGRAM(s) IV Push every 8 hours PRN  oxyCODONE    IR 2.5 milliGRAM(s) Oral every 6 hours PRN  rivaroxaban 15 milliGRAM(s) Oral with dinner  sodium bicarbonate 650 milliGRAM(s) Oral three times a day  spironolactone 25 milliGRAM(s) Oral daily      VITAL:  T(C): , Max: 36.6 (03-29-25 @ 11:18)  T(F): , Max: 97.8 (03-29-25 @ 11:18)  HR: 91 (03-29-25 @ 11:18)  BP: 161/84 (03-29-25 @ 11:18)  BP(mean): --  RR: 18 (03-29-25 @ 11:18)  SpO2: 97% (03-29-25 @ 11:18)  Wt(kg): --    03-28-25 @ 07:01  -  03-29-25 @ 07:00  --------------------------------------------------------  IN: 800 mL / OUT: 3250 mL / NET: -2450 mL    03-29-25 @ 07:01  -  03-29-25 @ 13:31  --------------------------------------------------------  IN: 475 mL / OUT: 1150 mL / NET: -675 mL        PHYSICAL EXAM:  Constitutional: NAD, Alert  HEENT: NCAT, MMM  Neck: Supple, No JVD  Respiratory: CTA-b/l  Cardiovascular: RRR s1s2, no m/r/g  Gastrointestinal: BS+, soft, NT/ND  Extremities: 1-2+ b/l LE edema  Neurological: no focal deficits; strength grossly intact  Back: no CVAT b/l  Skin: No rashes, no nevi    LABS:                          15.8   10.12 )-----------( 320      ( 29 Mar 2025 07:06 )             48.2     Na(132)/K(4.4)/Cl(90)/HCO3(23)/BUN(71)/Cr(2.44)Glu(133)/Ca(9.6)/Mg(--)/PO4(--)    03-29 @ 07:06  Na(133)/K(4.4)/Cl(91)/HCO3(24)/BUN(70)/Cr(2.66)Glu(127)/Ca(9.5)/Mg(2.7)/PO4(--)    03-28 @ 22:58  Na(136)/K(3.3)/Cl(91)/HCO3(23)/BUN(61)/Cr(2.33)Glu(192)/Ca(9.7)/Mg(2.8)/PO4(--)    03-28 @ 07:11  Na(--)/K(--)/Cl(--)/HCO3(--)/BUN(--)/Cr(--)Glu(--)/Ca(--)/Mg(--)/PO4(4.3)    03-27 @ 06:59  Na(133)/K(5.1)/Cl(94)/HCO3(21)/BUN(51)/Cr(2.27)Glu(394)/Ca(9.0)/Mg(2.9)/PO4(--)    03-26 @ 21:40  Na(135)/K(4.7)/Cl(94)/HCO3(26)/BUN(44)/Cr(2.17)Glu(375)/Ca(9.4)/Mg(--)/PO4(--)    03-26 @ 14:48    Urinalysis Basic - ( 29 Mar 2025 07:06 )    Color: x / Appearance: x / SG: x / pH: x  Gluc: 133 mg/dL / Ketone: x  / Bili: x / Urobili: x   Blood: x / Protein: x / Nitrite: x   Leuk Esterase: x / RBC: x / WBC x   Sq Epi: x / Non Sq Epi: x / Bacteria: x              IMPRESSION: 70M w/ HTN, DM2, CAD, HFrEF, and CKD4, 3/26/25 p/w ADHF    (1)CKD - 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 - renal fxn improving  (2)Hypokalemia - due to diuresis -improved s/p supplematation  (3)Hypermagnesemia- improving  (4)CV - acute on chronic HFrEF - on Bumex gtt    RECOMMEND:  (1)Bumex 1mg/h gtt  (2)Continue strict I/Os  (3)Dose new meds for GFR 20-30ml/min  (4)BMP+Mg at least daily      Justin Reynaga, NP-BC  [facility]  [phone]

## 2025-03-29 NOTE — PROGRESS NOTE ADULT - SUBJECTIVE AND OBJECTIVE BOX
Chief complaint    Patient is a 70y old  Male who presents with a chief complaint of Other disorders of soft tissue     (28 Mar 2025 10:54)   Review of systems  Patient appears comfortable.    Labs and Fingersticks  CAPILLARY BLOOD GLUCOSE      POCT Blood Glucose.: 288 mg/dL (29 Mar 2025 13:00)  POCT Blood Glucose.: 140 mg/dL (29 Mar 2025 08:28)  POCT Blood Glucose.: 142 mg/dL (28 Mar 2025 21:22)  POCT Blood Glucose.: 257 mg/dL (28 Mar 2025 18:03)      Anion Gap: 19 *H* (03-29 @ 07:06)  Anion Gap: 18 *H* (03-28 @ 22:58)  Anion Gap: 22 *H* (03-28 @ 07:11)      Calcium: 9.6 (03-29 @ 07:06)  Calcium: 9.5 (03-28 @ 22:58)  Calcium: 9.7 (03-28 @ 07:11)  Albumin: 4.0 (03-28 @ 07:11)    Alanine Aminotransferase (ALT/SGPT): 15 (03-28 @ 07:11)  Alkaline Phosphatase: 175 *H* (03-28 @ 07:11)  Aspartate Aminotransferase (AST/SGOT): 24 (03-28 @ 07:11)        03-29    132[L]  |  90[L]  |  71[H]  ----------------------------<  133[H]  4.4   |  23  |  2.44[H]    Ca    9.6      29 Mar 2025 07:06  Mg     2.7     03-28    TPro  7.1  /  Alb  4.0  /  TBili  1.5[H]  /  DBili  x   /  AST  24  /  ALT  15  /  AlkPhos  175[H]  03-28                        15.8   10.12 )-----------( 320      ( 29 Mar 2025 07:06 )             48.2     Medications  MEDICATIONS  (STANDING):  aspirin  chewable 81 milliGRAM(s) Oral daily  buMETAnide Infusion 1 mG/Hr (5 mL/Hr) IV Continuous <Continuous>  dextrose 5%. 1000 milliLiter(s) (100 mL/Hr) IV Continuous <Continuous>  dextrose 5%. 1000 milliLiter(s) (50 mL/Hr) IV Continuous <Continuous>  dextrose 50% Injectable 25 Gram(s) IV Push once  dextrose 50% Injectable 12.5 Gram(s) IV Push once  dextrose 50% Injectable 25 Gram(s) IV Push once  glucagon  Injectable 1 milliGRAM(s) IntraMuscular once  insulin glargine Injectable (LANTUS) 17 Unit(s) SubCutaneous at bedtime  insulin lispro (ADMELOG) corrective regimen sliding scale   SubCutaneous three times a day before meals  insulin lispro (ADMELOG) corrective regimen sliding scale   SubCutaneous at bedtime  insulin lispro Injectable (ADMELOG) 8 Unit(s) SubCutaneous three times a day before meals  isosorbide   dinitrate Tablet (ISORDIL) 10 milliGRAM(s) Oral three times a day  metoprolol succinate ER 75 milliGRAM(s) Oral daily  rivaroxaban 15 milliGRAM(s) Oral with dinner  sodium bicarbonate 650 milliGRAM(s) Oral three times a day  spironolactone 25 milliGRAM(s) Oral daily      Physical Exam  General: Patient appears comfortable.  Vital Signs Last 12 Hrs  T(F): 97.8 (03-29-25 @ 11:18), Max: 97.8 (03-29-25 @ 11:18)  HR: 91 (03-29-25 @ 11:18) (65 - 91)  BP: 161/84 (03-29-25 @ 11:18) (149/89 - 161/84)  BP(mean): --  RR: 18 (03-29-25 @ 11:18) (18 - 18)  SpO2: 97% (03-29-25 @ 11:18) (97% - 97%)  Neck: No palpable thyroid nodules.  CVS: S1S2, No murmurs  Respiratory: No wheezing, no crepitations  GI: Abdomen soft, non tender.    Diagnostics        Radiology:

## 2025-03-29 NOTE — PROGRESS NOTE ADULT - ASSESSMENT
Assessment  DMT2: 70y Male with DM T2 with hyperglycemia admitted with SOB,  patient was on oral hypoglycemic agents at home, now on  insulin coverage, blood sugars are trending down eating meals, compliant with low carb diet.  Patient is high risk with high level decision making due to uncontrolled diabetes, has increased risk for complications.   CAD: on medications, monitored, no acute events..  HTN: On antihypertensive medications, monitored, stable..          Myrna Rossi MD  Cell:  [phone]  Office: [phone]

## 2025-03-29 NOTE — PROGRESS NOTE ADULT - ASSESSMENT
#h/o ashd s/p multiple pci, last pci dLAD 4/17/24 by Dr ILDA Dominguez, known severe inoperable CAD, not amenable to intervention.  #HTN  #DM (Dr Reyes)  #ckd (Dr Christophe Walton)  #s/p cea by Dr Calle  # hfref exacerbation  #AF , remains on xarelto.   #s/p Left foot infection   #s/p RSV   Continue diuresis. Close follow up renal function and electrolytes.   Slow improvement.

## 2025-03-30 NOTE — PROGRESS NOTE ADULT - ASSESSMENT
#h/o ashd s/p multiple pci, last pci dLAD 4/17/24 by Dr ILDA Dominguez, known severe inoperable CAD, not amenable to intervention.  #HTN  #DM (Dr Reyes)  #ckd (Dr Christophe Walton)  #s/p cea by Dr Calle  # hfref exacerbation  #AF , remains on xarelto.   #s/p Left foot infection   #s/p RSV   Continue diuresis with bumex drip. Close follow up renal function and electrolytes, remain stable thus far.   Slow improvement.

## 2025-03-30 NOTE — PROGRESS NOTE ADULT - SUBJECTIVE AND OBJECTIVE BOX
Date of service: 25 @ 16:47      Patient is a 70y old  Male who presents with a chief complaint of Other disorders of soft tissue     (28 Mar 2025 10:54)                                                               INTERVAL HPI/OVERNIGHT EVENTS:    REVIEW OF SYSTEMS:     CONSTITUTIONAL: No weakness, fevers or chills  EYES/ENT: No visual changes , no ear ache   NECK: No pain or stiffness  RESPIRATORY: No cough, wheezing,  No shortness of breath  CARDIOVASCULAR: No chest pain or palpitations  GASTROINTESTINAL: No abdominal pain  . No nausea, vomiting, or hematemesis; No diarrhea or constipation. No melena or hematochezia.  GENITOURINARY: No dysuria, frequency or hematuria  NEUROLOGICAL: No numbness or weakness  SKIN: No itching, burning, rashes, or lesions                                                                                                                                                                                                                                                                                 Medications:  MEDICATIONS  (STANDING):  aspirin  chewable 81 milliGRAM(s) Oral daily  buMETAnide Infusion 1 mG/Hr (5 mL/Hr) IV Continuous <Continuous>  dextrose 5%. 1000 milliLiter(s) (50 mL/Hr) IV Continuous <Continuous>  dextrose 5%. 1000 milliLiter(s) (100 mL/Hr) IV Continuous <Continuous>  dextrose 50% Injectable 25 Gram(s) IV Push once  dextrose 50% Injectable 12.5 Gram(s) IV Push once  dextrose 50% Injectable 25 Gram(s) IV Push once  glucagon  Injectable 1 milliGRAM(s) IntraMuscular once  insulin glargine Injectable (LANTUS) 17 Unit(s) SubCutaneous at bedtime  insulin lispro (ADMELOG) corrective regimen sliding scale   SubCutaneous three times a day before meals  insulin lispro (ADMELOG) corrective regimen sliding scale   SubCutaneous at bedtime  insulin lispro Injectable (ADMELOG) 8 Unit(s) SubCutaneous three times a day before meals  isosorbide   dinitrate Tablet (ISORDIL) 10 milliGRAM(s) Oral three times a day  metoprolol succinate ER 75 milliGRAM(s) Oral daily  rivaroxaban 15 milliGRAM(s) Oral with dinner  sodium bicarbonate 650 milliGRAM(s) Oral three times a day  spironolactone 25 milliGRAM(s) Oral daily    MEDICATIONS  (PRN):  acetaminophen     Tablet .. 650 milliGRAM(s) Oral every 6 hours PRN Temp greater or equal to 38C (100.4F), Mild Pain (1 - 3)  aluminum hydroxide/magnesium hydroxide/simethicone Suspension 30 milliLiter(s) Oral every 4 hours PRN Dyspepsia  dextrose Oral Gel 15 Gram(s) Oral once PRN Blood Glucose LESS THAN 70 milliGRAM(s)/deciliter  melatonin 3 milliGRAM(s) Oral at bedtime PRN Insomnia  nitroglycerin     SubLingual 0.4 milliGRAM(s) SubLingual every 5 minutes PRN Chest Pain  ondansetron Injectable 4 milliGRAM(s) IV Push every 8 hours PRN Nausea and/or Vomiting  oxyCODONE    IR 2.5 milliGRAM(s) Oral every 6 hours PRN Severe Pain (7 - 10)       Allergies    atorvastatin (Muscle Pain (Severe))    Intolerances      Vital Signs Last 24 Hrs  T(C): 36.4 (30 Mar 2025 11:28), Max: 36.7 (30 Mar 2025 04:21)  T(F): 97.6 (30 Mar 2025 11:28), Max: 98 (30 Mar 2025 04:21)  HR: 95 (30 Mar 2025 15:56) (83 - 96)  BP: 130/80 (30 Mar 2025 15:56) (129/79 - 139/80)  BP(mean): --  RR: 18 (30 Mar 2025 15:56) (18 - 18)  SpO2: 96% (30 Mar 2025 15:56) (96% - 98%)    Parameters below as of 30 Mar 2025 15:56  Patient On (Oxygen Delivery Method): room air      CAPILLARY BLOOD GLUCOSE      POCT Blood Glucose.: 375 mg/dL (30 Mar 2025 12:43)  POCT Blood Glucose.: 171 mg/dL (30 Mar 2025 08:56)  POCT Blood Glucose.: 157 mg/dL (29 Mar 2025 21:31)  POCT Blood Glucose.: 231 mg/dL (29 Mar 2025 17:37)       @ 07:  -   @ 07:00  --------------------------------------------------------  IN: 1375 mL / OUT: 3175 mL / NET: -1800 mL     @ 07:  -   @ 16:47  --------------------------------------------------------  IN: 100 mL / OUT: 800 mL / NET: -700 mL      Physical Exam:    Daily     Daily Weight in k (30 Mar 2025 04:21)  General:  Well appearing, NAD, not cachetic  HEENT:  Nonicteric, PERRLA  CV:  RRR, S1S2   Lungs:  CTA B/L, no wheezes, rales, rhonchi  Abdomen:  Soft, non-tender, no distended, positive BS  Extremities:  2+ pulses, no c/c, no edema  Skin:  Warm and dry, no rashes  :  No moreno  Neuro:  AAOx3, non-focal, grossly intact                                                                                                                                                                                                                                                                                                LABS:                               15.8   10.12 )-----------( 320      ( 29 Mar 2025 07:06 )             48.2                      03-30    137  |  91[L]  |  74[H]  ----------------------------<  134[H]  3.8   |  27  |  2.35[H]    Ca    9.8      30 Mar 2025 06:40  Mg     2.9                              RADIOLOGY & ADDITIONAL TESTS         I personally reviewed: [  ]EKG   [  ]CXR    [  ] CT      A/P:         Discussed with :     Derek consultants' Notes   Time spent :   Date of service: 25 @ 16:47      Patient is a 70y old  Male who presents with a chief complaint of Other disorders of soft tissue     (28 Mar 2025 10:54)                                                               INTERVAL HPI/OVERNIGHT EVENTS:    REVIEW OF SYSTEMS:     CONSTITUTIONAL: No weakness, fevers or chills  EYES/ENT: No visual changes , no ear ache   NECK: No pain or stiffness  RESPIRATORY: No cough, wheezing,  No shortness of breath  CARDIOVASCULAR: No chest pain or palpitations  GASTROINTESTINAL: No abdominal pain  . No nausea, vomiting, or hematemesis; No diarrhea or constipation. No melena or hematochezia.  GENITOURINARY: No dysuria, frequency or hematuria  NEUROLOGICAL: No numbness or weakness  SKIN: No itching, burning, rashes, or lesions                                                                                                                                                                                                                                                                                 Medications:  MEDICATIONS  (STANDING):  aspirin  chewable 81 milliGRAM(s) Oral daily  buMETAnide Infusion 1 mG/Hr (5 mL/Hr) IV Continuous <Continuous>  dextrose 5%. 1000 milliLiter(s) (50 mL/Hr) IV Continuous <Continuous>  dextrose 5%. 1000 milliLiter(s) (100 mL/Hr) IV Continuous <Continuous>  dextrose 50% Injectable 25 Gram(s) IV Push once  dextrose 50% Injectable 12.5 Gram(s) IV Push once  dextrose 50% Injectable 25 Gram(s) IV Push once  glucagon  Injectable 1 milliGRAM(s) IntraMuscular once  insulin glargine Injectable (LANTUS) 17 Unit(s) SubCutaneous at bedtime  insulin lispro (ADMELOG) corrective regimen sliding scale   SubCutaneous three times a day before meals  insulin lispro (ADMELOG) corrective regimen sliding scale   SubCutaneous at bedtime  insulin lispro Injectable (ADMELOG) 8 Unit(s) SubCutaneous three times a day before meals  isosorbide   dinitrate Tablet (ISORDIL) 10 milliGRAM(s) Oral three times a day  metoprolol succinate ER 75 milliGRAM(s) Oral daily  rivaroxaban 15 milliGRAM(s) Oral with dinner  sodium bicarbonate 650 milliGRAM(s) Oral three times a day  spironolactone 25 milliGRAM(s) Oral daily    MEDICATIONS  (PRN):  acetaminophen     Tablet .. 650 milliGRAM(s) Oral every 6 hours PRN Temp greater or equal to 38C (100.4F), Mild Pain (1 - 3)  aluminum hydroxide/magnesium hydroxide/simethicone Suspension 30 milliLiter(s) Oral every 4 hours PRN Dyspepsia  dextrose Oral Gel 15 Gram(s) Oral once PRN Blood Glucose LESS THAN 70 milliGRAM(s)/deciliter  melatonin 3 milliGRAM(s) Oral at bedtime PRN Insomnia  nitroglycerin     SubLingual 0.4 milliGRAM(s) SubLingual every 5 minutes PRN Chest Pain  ondansetron Injectable 4 milliGRAM(s) IV Push every 8 hours PRN Nausea and/or Vomiting  oxyCODONE    IR 2.5 milliGRAM(s) Oral every 6 hours PRN Severe Pain (7 - 10)       Allergies    atorvastatin (Muscle Pain (Severe))    Intolerances      Vital Signs Last 24 Hrs  T(C): 36.4 (30 Mar 2025 11:28), Max: 36.7 (30 Mar 2025 04:21)  T(F): 97.6 (30 Mar 2025 11:28), Max: 98 (30 Mar 2025 04:21)  HR: 95 (30 Mar 2025 15:56) (83 - 96)  BP: 130/80 (30 Mar 2025 15:56) (129/79 - 139/80)  BP(mean): --  RR: 18 (30 Mar 2025 15:56) (18 - 18)  SpO2: 96% (30 Mar 2025 15:56) (96% - 98%)    Parameters below as of 30 Mar 2025 15:56  Patient On (Oxygen Delivery Method): room air      CAPILLARY BLOOD GLUCOSE      POCT Blood Glucose.: 375 mg/dL (30 Mar 2025 12:43)  POCT Blood Glucose.: 171 mg/dL (30 Mar 2025 08:56)  POCT Blood Glucose.: 157 mg/dL (29 Mar 2025 21:31)  POCT Blood Glucose.: 231 mg/dL (29 Mar 2025 17:37)       @ 07:  -   @ 07:00  --------------------------------------------------------  IN: 1375 mL / OUT: 3175 mL / NET: -1800 mL     @ 07:  -   @ 16:47  --------------------------------------------------------  IN: 100 mL / OUT: 800 mL / NET: -700 mL      Physical Exam:    Daily     Daily Weight in k (30 Mar 2025 04:21)  General:  Well appearing, NAD, not cachetic  HEENT:  Nonicteric, PERRLA  CV:  RRR, S1S2   Lungs:  CTA B/L, no wheezes, rales, rhonchi  Abdomen:  Soft, non-tender, no distended, positive BS  Extremities:edema B                                                                                                                                                                                                                                                                                      LABS:                               15.8   10.12 )-----------( 320      ( 29 Mar 2025 07:06 )             48.2                      03-30    137  |  91[L]  |  74[H]  ----------------------------<  134[H]  3.8   |  27  |  2.35[H]    Ca    9.8      30 Mar 2025 06:40  Mg     2.9     03-30                         RADIOLOGY & ADDITIONAL TESTS         I personally reviewed: [  ]EKG   [  ]CXR    [  ] CT      A/P:         Discussed with :     Derek consultants' Notes   Time spent :

## 2025-03-30 NOTE — PROGRESS NOTE ADULT - SUBJECTIVE AND OBJECTIVE BOX
SUBJECTIVE:  Notes distal edema, no chest pain or dyspnea.   	  MEDICATIONS:  buMETAnide Infusion 1 mG/Hr IV Continuous <Continuous>  isosorbide   dinitrate Tablet (ISORDIL) 10 milliGRAM(s) Oral three times a day  metoprolol succinate ER 75 milliGRAM(s) Oral daily  nitroglycerin     SubLingual 0.4 milliGRAM(s) SubLingual every 5 minutes PRN  spironolactone 25 milliGRAM(s) Oral daily  acetaminophen     Tablet .. 650 milliGRAM(s) Oral every 6 hours PRN  melatonin 3 milliGRAM(s) Oral at bedtime PRN  ondansetron Injectable 4 milliGRAM(s) IV Push every 8 hours PRN  oxyCODONE    IR 2.5 milliGRAM(s) Oral every 6 hours PRN  aluminum hydroxide/magnesium hydroxide/simethicone Suspension 30 milliLiter(s) Oral every 4 hours PRN  dextrose 50% Injectable 25 Gram(s) IV Push once  dextrose 50% Injectable 12.5 Gram(s) IV Push once  dextrose 50% Injectable 25 Gram(s) IV Push once  dextrose Oral Gel 15 Gram(s) Oral once PRN  glucagon  Injectable 1 milliGRAM(s) IntraMuscular once  insulin glargine Injectable (LANTUS) 17 Unit(s) SubCutaneous at bedtime  insulin lispro (ADMELOG) corrective regimen sliding scale   SubCutaneous three times a day before meals  insulin lispro (ADMELOG) corrective regimen sliding scale   SubCutaneous at bedtime  insulin lispro Injectable (ADMELOG) 8 Unit(s) SubCutaneous three times a day before meals  aspirin  chewable 81 milliGRAM(s) Oral daily  dextrose 5%. 1000 milliLiter(s) IV Continuous <Continuous>  dextrose 5%. 1000 milliLiter(s) IV Continuous <Continuous>  rivaroxaban 15 milliGRAM(s) Oral with dinner  sodium bicarbonate 650 milliGRAM(s) Oral three times a day      REVIEW OF SYSTEMS:    CONSTITUTIONAL: No fever, weight loss, or fatigue  EYES: No eye pain, visual disturbances, or discharge  NECK: No pain or stiffness  RESPIRATORY: No cough, wheezing, chills or hemoptysis; No Shortness of Breath  CARDIOVASCULAR: Notes bilateral leg edema  GASTROINTESTINAL: No abdominal or epigastric pain. No nausea, vomiting, or hematemesis; No diarrhea or constipation. No melena or hematochezia.  GENITOURINARY: No dysuria, frequency, hematuria, or incontinence  NEUROLOGICAL: No headaches, memory loss, loss of strength, numbness, or tremors  SKIN: No itching, burning, rashes, or lesions   LYMPH Nodes: No enlarged glands  MUSCULOSKELETAL: No joint pain or swelling; No muscle, back, or extremity pain  All other review of systems are negative.      PHYSICAL EXAM:  T(C): 36.7 (03-30-25 @ 04:21), Max: 36.7 (03-30-25 @ 04:21)  HR: 96 (03-30-25 @ 04:21) (87 - 96)  BP: 129/79 (03-30-25 @ 04:21) (129/79 - 161/84)  RR: 18 (03-30-25 @ 04:21) (18 - 18)  SpO2: 98% (03-30-25 @ 04:21) (96% - 98%)  Wt(kg): --  I&O's Summary    29 Mar 2025 07:01  -  30 Mar 2025 07:00  --------------------------------------------------------  IN: 1375 mL / OUT: 3175 mL / NET: -1800 mL    30 Mar 2025 07:01  -  30 Mar 2025 09:59  --------------------------------------------------------  IN: 0 mL / OUT: 600 mL / NET: -600 mL          PHYSICAL EXAM    Appearance: Normal	  HEENT:   Normal oral mucosa, PERRL, EOMI	  NECK: Soft and supple, No LAD, No JVD  Cardiovascular: Regular Rate and Rhythm, Normal S1 S2, No murmurs, No clicks, gallops or rubs  Respiratory: Lungs clear to auscultation	  Skin: No rashes, No ecchymoses, No cyanosis  Neurologic: Non-focal  Extremities: mild to moderate bilateral distal edema    LABS:	 	                            15.8   10.12 )-----------( 320      ( 29 Mar 2025 07:06 )             48.2     03-30    137  |  91[L]  |  74[H]  ----------------------------<  134[H]  3.8   |  27  |  2.35[H]    Ca    9.8      30 Mar 2025 06:40  Mg     2.9     03-30

## 2025-03-30 NOTE — PROGRESS NOTE ADULT - ASSESSMENT
Assessment  DMT2: 70y Male with DM T2 with hyperglycemia admitted with SOB,  patient was on oral hypoglycemic agents at home, now on  insulin coverage, blood sugars are not at target. non compliant with low carb diet.  Patient is high risk with high level decision making due to uncontrolled diabetes, has increased risk for complications.   CAD: on medications, monitored, no acute events..  HTN: On antihypertensive medications, monitored, stable..          Myrna Rossi MD  Cell:  [phone]  Office: [phone]

## 2025-03-30 NOTE — PROGRESS NOTE ADULT - ASSESSMENT
on room air   afebrile  denies any acute complaints    acetaminophen     Tablet .. 650 milliGRAM(s) Oral every 6 hours PRN  aluminum hydroxide/magnesium hydroxide/simethicone Suspension 30 milliLiter(s) Oral every 4 hours PRN  aspirin  chewable 81 milliGRAM(s) Oral daily  buMETAnide Infusion 1 mG/Hr IV Continuous <Continuous>  dextrose 5%. 1000 milliLiter(s) IV Continuous <Continuous>  dextrose 5%. 1000 milliLiter(s) IV Continuous <Continuous>  dextrose 50% Injectable 25 Gram(s) IV Push once  dextrose 50% Injectable 12.5 Gram(s) IV Push once  dextrose 50% Injectable 25 Gram(s) IV Push once  dextrose Oral Gel 15 Gram(s) Oral once PRN  glucagon  Injectable 1 milliGRAM(s) IntraMuscular once  insulin glargine Injectable (LANTUS) 17 Unit(s) SubCutaneous at bedtime  insulin lispro (ADMELOG) corrective regimen sliding scale   SubCutaneous three times a day before meals  insulin lispro (ADMELOG) corrective regimen sliding scale   SubCutaneous at bedtime  insulin lispro Injectable (ADMELOG) 8 Unit(s) SubCutaneous three times a day before meals  isosorbide   dinitrate Tablet (ISORDIL) 10 milliGRAM(s) Oral three times a day  melatonin 3 milliGRAM(s) Oral at bedtime PRN  metoprolol succinate ER 75 milliGRAM(s) Oral daily  nitroglycerin     SubLingual 0.4 milliGRAM(s) SubLingual every 5 minutes PRN  ondansetron Injectable 4 milliGRAM(s) IV Push every 8 hours PRN  oxyCODONE    IR 2.5 milliGRAM(s) Oral every 6 hours PRN  rivaroxaban 15 milliGRAM(s) Oral with dinner  sodium bicarbonate 650 milliGRAM(s) Oral three times a day  spironolactone 25 milliGRAM(s) Oral daily      VITAL:  T(C): , Max: 36.7 (03-30-25 @ 04:21)  T(F): , Max: 98 (03-30-25 @ 04:21)  HR: 96 (03-30-25 @ 04:21)  BP: 129/79 (03-30-25 @ 04:21)  BP(mean): --  RR: 18 (03-30-25 @ 04:21)  SpO2: 98% (03-30-25 @ 04:21)  Wt(kg): --    03-29-25 @ 07:01  -  03-30-25 @ 07:00  --------------------------------------------------------  IN: 1375 mL / OUT: 3175 mL / NET: -1800 mL    03-30-25 @ 07:01  -  03-30-25 @ 10:36  --------------------------------------------------------  IN: 100 mL / OUT: 600 mL / NET: -500 mL        PHYSICAL EXAM:  Constitutional: NAD, Alert  HEENT: NCAT, MMM  Neck: Supple, No JVD  Respiratory: CTA-b/l  Cardiovascular: RRR s1s2, no m/r/g  Gastrointestinal: BS+, soft, NT/ND  Extremities: 1-2+ b/l LE edema  Neurological: no focal deficits; strength grossly intact  Back: no CVAT b/l  Skin: No rashes, no nevi    LABS:                          15.8   10.12 )-----------( 320      ( 29 Mar 2025 07:06 )             48.2     Na(137)/K(3.8)/Cl(91)/HCO3(27)/BUN(74)/Cr(2.35)Glu(134)/Ca(9.8)/Mg(2.9)/PO4(--)    03-30 @ 06:40  Na(132)/K(4.4)/Cl(90)/HCO3(23)/BUN(71)/Cr(2.44)Glu(133)/Ca(9.6)/Mg(--)/PO4(--)    03-29 @ 07:06  Na(133)/K(4.4)/Cl(91)/HCO3(24)/BUN(70)/Cr(2.66)Glu(127)/Ca(9.5)/Mg(2.7)/PO4(--)    03-28 @ 22:58  Na(136)/K(3.3)/Cl(91)/HCO3(23)/BUN(61)/Cr(2.33)Glu(192)/Ca(9.7)/Mg(2.8)/PO4(--)    03-28 @ 07:11    Urinalysis Basic - ( 30 Mar 2025 06:40 )    Color: x / Appearance: x / SG: x / pH: x  Gluc: 134 mg/dL / Ketone: x  / Bili: x / Urobili: x   Blood: x / Protein: x / Nitrite: x   Leuk Esterase: x / RBC: x / WBC x   Sq Epi: x / Non Sq Epi: x / Bacteria: x                IMPRESSION: 70M w/ HTN, DM2, CAD, HFrEF, and CKD4, 3/26/25 p/w ADHF    (1)CKD - 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 - improving renal fxn  (2)Lytes- controlled  (3)Hypermagnesemia  (4)CV - acute on chronic HFrEF - on Bumex gtt, BP controlled     RECOMMEND:  Continue Bumex 1mg/h gtt  (2)Continue strict I/Os  (3)Dose new meds for GFR 20-30ml/min  (4)BMP+Mg at least daily      Justin Reynaga NP-BC  [facility]  [phone]

## 2025-03-30 NOTE — PROGRESS NOTE ADULT - PROBLEM SELECTOR PLAN 1
Will increase Lantus to 20 units at bed time.  Will increase Admelog to 9 units before each meal in addition to Admelog correction scale coverage.  Patient counseled for compliance with consistent low carb diet and physical activity as tolerated.  .

## 2025-03-30 NOTE — PROGRESS NOTE ADULT - ASSESSMENT
A/P     # Acute decompensated heart failure  likely noncompliance to meds at home   seen by cardio   c/w Bumex GTT   monitor I/O   daily weight     # Wound of left foot: Left dorsal foot ulceration:   Stable, noninfected, present on admission  local wound care    #Type 2 diabetes mellitus.:   uncontrolled  A1c  worse than in the past   endo following   c/w insulin / FSBS     # CAD/ Ch Afib.  -c/w xarelto and toprol.  rate controlled     # CAD (coronary artery disease).  -c/w asa 81mg    # Stage 4 chronic kidney disease.   stable renal fx   c/w bumex GTT   renal following     donna escobar MD  covering Dr. Mcdaniel  A/P     # Acute decompensated heart failure  likely noncompliance to meds at home   seen by cardio   c/w Bumex GTT   monitor I/O   daily weight     # Wound of left foot: Left dorsal foot ulceration:   Stable, noninfected, present on admission  local wound care    #Type 2 diabetes mellitus.:   uncontrolled  A1c  worse than in the past   endo following   c/w insulin / FSBS     # CAD/ Ch Afib.  -c/w xarelto and toprol.  rate controlled     # CAD (coronary artery disease).  -c/w asa 81mg    # Stage 4 chronic kidney disease.   stable renal fx   c/w bumex GTT   renal following

## 2025-03-30 NOTE — PROGRESS NOTE ADULT - SUBJECTIVE AND OBJECTIVE BOX
Chief complaint    Patient is a 70y old  Male who presents with a chief complaint of Other disorders of soft tissue     (28 Mar 2025 10:54)   Review of systems  Patient appears comfortable.    Labs and Fingersticks  CAPILLARY BLOOD GLUCOSE      POCT Blood Glucose.: 267 mg/dL (30 Mar 2025 21:34)  POCT Blood Glucose.: 260 mg/dL (30 Mar 2025 17:30)  POCT Blood Glucose.: 375 mg/dL (30 Mar 2025 12:43)  POCT Blood Glucose.: 171 mg/dL (30 Mar 2025 08:56)      Anion Gap: 17 (03-30 @ 17:52)  Anion Gap: 19 *H* (03-30 @ 06:40)  Anion Gap: 19 *H* (03-29 @ 07:06)  Anion Gap: 18 *H* (03-28 @ 22:58)      Calcium: 9.7 (03-30 @ 17:52)  Calcium: 9.8 (03-30 @ 06:40)  Calcium: 9.6 (03-29 @ 07:06)  Calcium: 9.5 (03-28 @ 22:58)          03-30    132[L]  |  87[L]  |  77[H]  ----------------------------<  273[H]  4.3   |  28  |  2.29[H]    Ca    9.7      30 Mar 2025 17:52  Mg     2.9     03-30                          15.8   10.12 )-----------( 320      ( 29 Mar 2025 07:06 )             48.2     Medications  MEDICATIONS  (STANDING):  aspirin  chewable 81 milliGRAM(s) Oral daily  buMETAnide Infusion 1 mG/Hr (5 mL/Hr) IV Continuous <Continuous>  dextrose 5%. 1000 milliLiter(s) (100 mL/Hr) IV Continuous <Continuous>  dextrose 5%. 1000 milliLiter(s) (50 mL/Hr) IV Continuous <Continuous>  dextrose 50% Injectable 25 Gram(s) IV Push once  dextrose 50% Injectable 12.5 Gram(s) IV Push once  dextrose 50% Injectable 25 Gram(s) IV Push once  glucagon  Injectable 1 milliGRAM(s) IntraMuscular once  insulin glargine Injectable (LANTUS) 17 Unit(s) SubCutaneous at bedtime  insulin lispro (ADMELOG) corrective regimen sliding scale   SubCutaneous three times a day before meals  insulin lispro (ADMELOG) corrective regimen sliding scale   SubCutaneous at bedtime  insulin lispro Injectable (ADMELOG) 8 Unit(s) SubCutaneous three times a day before meals  isosorbide   dinitrate Tablet (ISORDIL) 10 milliGRAM(s) Oral three times a day  metoprolol succinate ER 75 milliGRAM(s) Oral daily  rivaroxaban 15 milliGRAM(s) Oral with dinner  sodium bicarbonate 650 milliGRAM(s) Oral three times a day  spironolactone 25 milliGRAM(s) Oral daily      Physical Exam  General: Patient appears comfortable.  Vital Signs Last 12 Hrs  T(F): 98 (03-30-25 @ 20:34), Max: 98 (03-30-25 @ 20:34)  HR: 96 (03-30-25 @ 20:34) (83 - 96)  BP: 125/73 (03-30-25 @ 20:34) (125/73 - 139/80)  BP(mean): --  RR: 18 (03-30-25 @ 20:34) (18 - 18)  SpO2: 99% (03-30-25 @ 20:34) (96% - 99%)  Neck: No palpable thyroid nodules.  CVS: S1S2, No murmurs  Respiratory: No wheezing, no crepitations  GI: Abdomen soft, non tender.    Diagnostics        Radiology:

## 2025-03-31 NOTE — PROGRESS NOTE ADULT - ASSESSMENT
#h/o ashd s/p multiple pci, last pci dLAD 4/17/24 by Dr ILDA Dominguez, known severe inoperable CAD, not amenable to intervention.  #HTN  #DM (Dr Reyes)  #ckd (Dr Christophe Walton)  #s/p cea by Dr Calle  # hfref exacerbation  #AF , remains on xarelto.   #s/p Left foot infection   #s/p RSV   Continue diuresis with bumex drip. Close follow up renal function and electrolytes, remain stable thus far.   With mild erythema and swelling in RLE would obtain venous doppler to r/o DVT  Would also call the CHF team to place a Cardiomems for better control of CHF and hospital readmissions  Discussed with Celestina ortega NP and Dr. Mcdaniel.

## 2025-03-31 NOTE — PROGRESS NOTE ADULT - SUBJECTIVE AND OBJECTIVE BOX
on room air   afebrile  denies any acute complaints  bumex gtt @1/h for now. 2L out Cr stable and actually improved.     acetaminophen     Tablet .. 650 milliGRAM(s) Oral every 6 hours PRN  aluminum hydroxide/magnesium hydroxide/simethicone Suspension 30 milliLiter(s) Oral every 4 hours PRN  aspirin  chewable 81 milliGRAM(s) Oral daily  buMETAnide Infusion 1 mG/Hr IV Continuous <Continuous>  dextrose 5%. 1000 milliLiter(s) IV Continuous <Continuous>  dextrose 5%. 1000 milliLiter(s) IV Continuous <Continuous>  dextrose 50% Injectable 25 Gram(s) IV Push once  dextrose 50% Injectable 12.5 Gram(s) IV Push once  dextrose 50% Injectable 25 Gram(s) IV Push once  dextrose Oral Gel 15 Gram(s) Oral once PRN  glucagon  Injectable 1 milliGRAM(s) IntraMuscular once  insulin glargine Injectable (LANTUS) 17 Unit(s) SubCutaneous at bedtime  insulin lispro (ADMELOG) corrective regimen sliding scale   SubCutaneous three times a day before meals  insulin lispro (ADMELOG) corrective regimen sliding scale   SubCutaneous at bedtime  insulin lispro Injectable (ADMELOG) 8 Unit(s) SubCutaneous three times a day before meals  isosorbide   dinitrate Tablet (ISORDIL) 10 milliGRAM(s) Oral three times a day  melatonin 3 milliGRAM(s) Oral at bedtime PRN  metoprolol succinate ER 75 milliGRAM(s) Oral daily  nitroglycerin     SubLingual 0.4 milliGRAM(s) SubLingual every 5 minutes PRN  ondansetron Injectable 4 milliGRAM(s) IV Push every 8 hours PRN  oxyCODONE    IR 2.5 milliGRAM(s) Oral every 6 hours PRN  rivaroxaban 15 milliGRAM(s) Oral with dinner  sodium bicarbonate 650 milliGRAM(s) Oral three times a day  spironolactone 25 milliGRAM(s) Oral daily      VITAL:  T(C): , Max: 36.7 (03-30-25 @ 04:21)  T(F): , Max: 98 (03-30-25 @ 04:21)  HR: 96 (03-30-25 @ 04:21)  BP: 129/79 (03-30-25 @ 04:21)  BP(mean): --  RR: 18 (03-30-25 @ 04:21)  SpO2: 98% (03-30-25 @ 04:21)  Wt(kg): --    03-29-25 @ 07:01  -  03-30-25 @ 07:00  --------------------------------------------------------  IN: 1375 mL / OUT: 3175 mL / NET: -1800 mL    03-30-25 @ 07:01  -  03-30-25 @ 10:36  --------------------------------------------------------  IN: 100 mL / OUT: 600 mL / NET: -500 mL        PHYSICAL EXAM:  Constitutional: NAD, Alert  HEENT: NCAT, MMM  Neck: Supple, No JVD  Respiratory: CTA-b/l  Cardiovascular: RRR s1s2, no m/r/g  Gastrointestinal: BS+, soft, NT/ND  Extremities: 1-2+ b/l LE edema  Neurological: no focal deficits; strength grossly intact  Back: no CVAT b/l  Skin: No rashes, no nevi    LABS:                          15.8   10.12 )-----------( 320      ( 29 Mar 2025 07:06 )             48.2     Na(137)/K(3.8)/Cl(91)/HCO3(27)/BUN(74)/Cr(2.35)Glu(134)/Ca(9.8)/Mg(2.9)/PO4(--)    03-30 @ 06:40  Na(132)/K(4.4)/Cl(90)/HCO3(23)/BUN(71)/Cr(2.44)Glu(133)/Ca(9.6)/Mg(--)/PO4(--)    03-29 @ 07:06  Na(133)/K(4.4)/Cl(91)/HCO3(24)/BUN(70)/Cr(2.66)Glu(127)/Ca(9.5)/Mg(2.7)/PO4(--)    03-28 @ 22:58  Na(136)/K(3.3)/Cl(91)/HCO3(23)/BUN(61)/Cr(2.33)Glu(192)/Ca(9.7)/Mg(2.8)/PO4(--)    03-28 @ 07:11    Urinalysis Basic - ( 30 Mar 2025 06:40 )    Color: x / Appearance: x / SG: x / pH: x  Gluc: 134 mg/dL / Ketone: x  / Bili: x / Urobili: x   Blood: x / Protein: x / Nitrite: x   Leuk Esterase: x / RBC: x / WBC x   Sq Epi: x / Non Sq Epi: x / Bacteria: x                IMPRESSION: 70M w/ HTN, DM2, CAD, HFrEF, and CKD4, 3/26/25 p/w ADHF    (1)CKD - 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 - improving renal fxn  (2)Lytes- controlled  (3)Hypermagnesemia  (4)CV - acute on chronic HFrEF - on Bumex gtt @1, stable Cr. CHF consulted for possible mems placement    RECOMMEND:  (1)Continue Bumex 1mg/h gtt  (2) Kcl 40mEq x1 dose today  (2)Continue strict I/Os  (3)Dose new meds for GFR 30-35ml/min  (4)BMP+Mg at least daily    Bran Velásquez DO   [employer]  [phone]

## 2025-03-31 NOTE — PROGRESS NOTE ADULT - SUBJECTIVE AND OBJECTIVE BOX
Chief complaint    Patient is a 70y old  Male who presents with a chief complaint of Other disorders of soft tissue     (28 Mar 2025 10:54)   Review of systems  Patient appears comfortable.    Labs and Fingersticks  CAPILLARY BLOOD GLUCOSE      POCT Blood Glucose.: 318 mg/dL (31 Mar 2025 13:37)  POCT Blood Glucose.: 202 mg/dL (31 Mar 2025 08:53)  POCT Blood Glucose.: 267 mg/dL (30 Mar 2025 21:34)  POCT Blood Glucose.: 260 mg/dL (30 Mar 2025 17:30)      Anion Gap: 21 *H* (03-31 @ 06:42)  Anion Gap: 17 (03-30 @ 17:52)  Anion Gap: 19 *H* (03-30 @ 06:40)      Calcium: 9.9 (03-31 @ 06:42)  Calcium: 9.7 (03-30 @ 17:52)  Calcium: 9.8 (03-30 @ 06:40)  Albumin: 4.0 (03-31 @ 06:42)    Alanine Aminotransferase (ALT/SGPT): 21 (03-31 @ 06:42)  Alkaline Phosphatase: 181 *H* (03-31 @ 06:42)  Aspartate Aminotransferase (AST/SGOT): 29 (03-31 @ 06:42)        03-31    135  |  90[L]  |  76[H]  ----------------------------<  223[H]  3.6   |  24  |  2.23[H]    Ca    9.9      31 Mar 2025 06:42  Mg     2.9     03-30    TPro  7.4  /  Alb  4.0  /  TBili  1.0  /  DBili  x   /  AST  29  /  ALT  21  /  AlkPhos  181[H]  03-31    Medications  MEDICATIONS  (STANDING):  aspirin  chewable 81 milliGRAM(s) Oral daily  buMETAnide Infusion 1 mG/Hr (5 mL/Hr) IV Continuous <Continuous>  dextrose 5%. 1000 milliLiter(s) (50 mL/Hr) IV Continuous <Continuous>  dextrose 5%. 1000 milliLiter(s) (100 mL/Hr) IV Continuous <Continuous>  dextrose 50% Injectable 25 Gram(s) IV Push once  dextrose 50% Injectable 12.5 Gram(s) IV Push once  dextrose 50% Injectable 25 Gram(s) IV Push once  glucagon  Injectable 1 milliGRAM(s) IntraMuscular once  insulin glargine Injectable (LANTUS) 22 Unit(s) SubCutaneous at bedtime  insulin lispro (ADMELOG) corrective regimen sliding scale   SubCutaneous three times a day before meals  insulin lispro (ADMELOG) corrective regimen sliding scale   SubCutaneous at bedtime  insulin lispro Injectable (ADMELOG) 10 Unit(s) SubCutaneous three times a day before meals  isosorbide   dinitrate Tablet (ISORDIL) 10 milliGRAM(s) Oral three times a day  metoprolol succinate ER 75 milliGRAM(s) Oral daily  rivaroxaban 15 milliGRAM(s) Oral with dinner  sodium bicarbonate 650 milliGRAM(s) Oral three times a day  spironolactone 25 milliGRAM(s) Oral daily      Physical Exam  General: Patient appears comfortable.  Vital Signs Last 12 Hrs  T(F): 97.4 (03-31-25 @ 11:26), Max: 97.6 (03-31-25 @ 04:51)  HR: 89 (03-31-25 @ 11:26) (89 - 96)  BP: 174/84 (03-31-25 @ 11:26) (131/84 - 174/84)  BP(mean): --  RR: 18 (03-31-25 @ 11:26) (18 - 18)  SpO2: 98% (03-31-25 @ 11:26) (98% - 98%)  Neck: No palpable thyroid nodules.  CVS: S1S2, No murmurs  Respiratory: No wheezing, no crepitations  GI: Abdomen soft, non tender.    Diagnostics        Radiology:

## 2025-03-31 NOTE — PROGRESS NOTE ADULT - SUBJECTIVE AND OBJECTIVE BOX
Date of service: 25 @ 11:22      Patient is a 70y old  Male who presents with a chief complaint of Other disorders of soft tissue     (28 Mar 2025 10:54)                                                               INTERVAL HPI/OVERNIGHT EVENTS:    REVIEW OF SYSTEMS:     no cp   no sob                                                                                                                                                                                                                                                                              Medications:  MEDICATIONS  (STANDING):  aspirin  chewable 81 milliGRAM(s) Oral daily  buMETAnide Infusion 1 mG/Hr (5 mL/Hr) IV Continuous <Continuous>  dextrose 5%. 1000 milliLiter(s) (100 mL/Hr) IV Continuous <Continuous>  dextrose 5%. 1000 milliLiter(s) (50 mL/Hr) IV Continuous <Continuous>  dextrose 50% Injectable 25 Gram(s) IV Push once  dextrose 50% Injectable 12.5 Gram(s) IV Push once  dextrose 50% Injectable 25 Gram(s) IV Push once  glucagon  Injectable 1 milliGRAM(s) IntraMuscular once  insulin glargine Injectable (LANTUS) 20 Unit(s) SubCutaneous at bedtime  insulin lispro (ADMELOG) corrective regimen sliding scale   SubCutaneous three times a day before meals  insulin lispro (ADMELOG) corrective regimen sliding scale   SubCutaneous at bedtime  insulin lispro Injectable (ADMELOG) 9 Unit(s) SubCutaneous three times a day before meals  isosorbide   dinitrate Tablet (ISORDIL) 10 milliGRAM(s) Oral three times a day  metoprolol succinate ER 75 milliGRAM(s) Oral daily  rivaroxaban 15 milliGRAM(s) Oral with dinner  sodium bicarbonate 650 milliGRAM(s) Oral three times a day  spironolactone 25 milliGRAM(s) Oral daily    MEDICATIONS  (PRN):  acetaminophen     Tablet .. 650 milliGRAM(s) Oral every 6 hours PRN Temp greater or equal to 38C (100.4F), Mild Pain (1 - 3)  aluminum hydroxide/magnesium hydroxide/simethicone Suspension 30 milliLiter(s) Oral every 4 hours PRN Dyspepsia  dextrose Oral Gel 15 Gram(s) Oral once PRN Blood Glucose LESS THAN 70 milliGRAM(s)/deciliter  melatonin 3 milliGRAM(s) Oral at bedtime PRN Insomnia  nitroglycerin     SubLingual 0.4 milliGRAM(s) SubLingual every 5 minutes PRN Chest Pain  ondansetron Injectable 4 milliGRAM(s) IV Push every 8 hours PRN Nausea and/or Vomiting  oxyCODONE    IR 2.5 milliGRAM(s) Oral every 6 hours PRN Severe Pain (7 - 10)       Allergies    atorvastatin (Muscle Pain (Severe))    Intolerances      Vital Signs Last 24 Hrs  T(C): 36.4 (31 Mar 2025 04:51), Max: 36.7 (30 Mar 2025 20:34)  T(F): 97.6 (31 Mar 2025 04:51), Max: 98 (30 Mar 2025 20:34)  HR: 96 (31 Mar 2025 04:51) (83 - 96)  BP: 131/84 (31 Mar 2025 04:51) (125/73 - 139/80)  BP(mean): --  RR: 18 (31 Mar 2025 04:51) (18 - 18)  SpO2: 98% (31 Mar 2025 04:51) (96% - 99%)    Parameters below as of 31 Mar 2025 04:51  Patient On (Oxygen Delivery Method): room air      CAPILLARY BLOOD GLUCOSE      POCT Blood Glucose.: 202 mg/dL (31 Mar 2025 08:53)  POCT Blood Glucose.: 267 mg/dL (30 Mar 2025 21:34)  POCT Blood Glucose.: 260 mg/dL (30 Mar 2025 17:30)  POCT Blood Glucose.: 375 mg/dL (30 Mar 2025 12:43)       @ 07:01  -   @ 07:00  --------------------------------------------------------  IN: 300 mL / OUT: 2300 mL / NET: -2000 mL      Physical Exam:    Daily     Daily Weight in k.5 (31 Mar 2025 06:40)  General:  Well appearing, NAD, not cachetic  HEENT:  Nonicteric, PERRLA  CV:  RRR, S1S2   Lungs:  CTA B/L, no wheezes, rales, rhonchi  Abdomen:  Soft, non-tender, no distended, positive BS  Extremities: edema                                                                                                                                                                                                                                                                          LABS:                                135  |  90[L]  |  76[H]  ----------------------------<  223[H]  3.6   |  24  |  2.23[H]    Ca    9.9      31 Mar 2025 06:42  Mg     2.9     03-    TPro  7.4  /  Alb  4.0  /  TBili  1.0  /  DBili  x   /  AST  29  /  ALT  21  /  AlkPhos  181[H]                         RADIOLOGY & ADDITIONAL TESTS         I personally reviewed: [  ]EKG   [  ]CXR    [  ] CT      A/P:         Discussed with :     Derek consultants' Notes   Time spent :

## 2025-03-31 NOTE — PROGRESS NOTE ADULT - ASSESSMENT
·  Problem: Acute decompensated heart failure. :  questionable compliance   ·  Plan: -c/w bumex gtt.   monitor Cr   d/w  : check Va duplex  HF consult for possible cardioMEMs        ·  Problem: Wound of left foot: Left dorsal foot ulceration: Stable, noninfected, present on admission  local wound care per pod       ·  Problem: Type 2 diabetes mellitus.: uncontrolled  A1c  worse than in the past hence questionable complaince   endo consulted     ·  Problem: Afib.  ·  Plan: -c/w xarelto and toprol.    ·  Problem: CAD (coronary artery disease).  ·  Plan: -c/w asa 81mg       Stage 4 chronic kidney disease.   ·  Plan: -stable. eval by nephro.   -dose meds per renal fx.

## 2025-03-31 NOTE — CONSULT NOTE ADULT - PROBLEM SELECTOR RECOMMENDATION 9
Will start Lantus 17 units at bed time.  Will start Admelog 8 units before each meal in addition to Admelog correction scale coverage.  Patient counseled for compliance with consistent low carb diet and physical activity as tolerated.  .
-Etiology: likely ischemic  -GDMT: c/w spironolactone 25mg QD and Toprol XL 75mg QD. Also on ISDN 10mg TID for afterload and will increase it to 20mg TID today, hold parameter SBP <90.   -No SGLT2i at this time given his hx of PADs  -Eventual ARB/ARNi if renal function permits  -Diuretics: c/w bumex 1mg/hr  -Strict I/Os and daily weights  -Keep K>4 and Mg>2
I Tyrone Calle MD have participated in the daily care of this patient and have performed a history and physical exam of the patient and discussed  the findings and plan with the house officer. I reviewed the resident note and agree with the findings and plan   I Tyrone Calle MD have personally seen and examined the patient at bedside

## 2025-03-31 NOTE — CONSULT NOTE ADULT - ASSESSMENT
Mr Velázquez is a 69M, with hx of HFrEF, and PMH of CAD (s/p PCI to LM/LAD and Cx, occluded 100% dLAD not amenable to PCI), Afib (on xarelto), T2DM, CKD4, HTN, is here today for SOB and BLE for ADHF and also for L foot wound and HF consulted for further management. Patient has recent admission 1/31 to 2/7 for ADHF.       He appears volume overload on exam and would continue to diurese him at this time.     Cardiac Studies:  [facility] 2/4/25: occluded dLAD, D1 100% stenosis  -1/29/24: patient prior stents in the LM, LAD and Cx, severe disease dLAD in a smaller caliber sized vessel unchanged from prior [facility] that is not amenable to PCI   TTE 1/20/25: LVIDd 3.7cm, LVEF 40-45%, RV nml size, borderline reduced RV function, TAPSE 1.1 cm, mildly dilated LA, dilated RA, mild/mod MR, mild TR, PASP 49, IVC nml  -TTE 6/24/24: LVIDd 3.8cm, LVEF 45-50%, nml RV size/function, nml atria, mild MR, mild TR,  Outpatient cardiologist: Dr. Alfred Moon     Mr Velázquez is a 69M, with hx of HFrEF, and PMH of CAD (s/p PCI to LM/LAD and Cx, occluded 100% dLAD not amenable to PCI), Afib (on xarelto), T2DM, CKD4, HTN, known PAD s/p recent LLE with PT and peroneal stent placement (1/23), is here today for SOB and BLE for ADHF and also for L foot wound and HF consulted for further management. Patient has recent admission 1/31 to 2/7 for ADHF.     He appears volume overload on exam and would continue to diurese him at this time.     Cardiac Studies:  [facility] 2/4/25: occluded dLAD, D1 100% stenosis  -1/29/24: patient prior stents in the LM, LAD and Cx, severe disease dLAD in a smaller caliber sized vessel unchanged from prior [facility] that is not amenable to PCI   TTE 1/20/25: LVIDd 3.7cm, LVEF 40-45%, RV nml size, borderline reduced RV function, TAPSE 1.1 cm, mildly dilated LA, dilated RA, mild/mod MR, mild TR, PASP 49, IVC nml  -TTE 6/24/24: LVIDd 3.8cm, LVEF 45-50%, nml RV size/function, nml atria, mild MR, mild TR,  Outpatient cardiologist: Dr. Alfred Moon     Mr Velázquez is a 69M, with hx of HFmrEF, and PMH of CAD (hx of multiple stents, occluded 100% dLAD not amenable to PCI), Afib (on xarelto), T2DM, CKD4, HTN, known PAD s/p recent LLE with PT and peroneal stent placement (1/23), is here today for SOB and BLE for ADHF and also for L foot wound and HF consulted for further management. Patient has recent admission 1/31 to 2/7 for ADHF.     He appears volume overload on exam and would continue to diurese him at this time. He would benefit a cardioMEMS and will provide education tools to him and his wife. Once he is more euvolemic, and in agreement - will plan for RHC w/cardioMEMS implant this Friday.     Cardiac Studies:  [facility] 2/4/25: occluded dLAD, D1 100% stenosis  -1/29/24: patient prior stents in the LM, LAD and Cx, severe disease dLAD in a smaller caliber sized vessel unchanged from prior [facility] that is not amenable to PCI   TTE 1/20/25: LVIDd 3.7cm, LVEF 40-45%, RV nml size, borderline reduced RV function, TAPSE 1.1 cm, mildly dilated LA, dilated RA, mild/mod MR, mild TR, PASP 49, IVC nml  -TTE 6/24/24: LVIDd 3.8cm, LVEF 45-50%, nml RV size/function, nml atria, mild MR, mild TR,

## 2025-03-31 NOTE — CHART NOTE - NSCHARTNOTEFT_GEN_A_CORE
Patient is a 70y old  Male who presents with a chief complaint of Other disorders of soft tissue.  Called by RN, pt complaining of chest pain across his chest.  Pt is unable to describe the pain says " I just have pain in my chest".   As per pt he was eating.  He laid down briefly then experienced chest pain.      HPI:    Vital Signs Last 24 Hrs  T(C): 36.4 (31 Mar 2025 04:51), Max: 36.7 (30 Mar 2025 20:34)  T(F): 97.6 (31 Mar 2025 04:51), Max: 98 (30 Mar 2025 20:34)  HR: 96 (31 Mar 2025 04:51) (95 - 96)  BP: 131/84 (31 Mar 2025 04:51) (125/73 - 131/84)  BP(mean): --  RR: 18 (31 Mar 2025 04:51) (18 - 18)  SpO2: 98% (31 Mar 2025 04:51) (96% - 99%)  Patient On (Oxygen Delivery Method): room air    Gen:  69 y/o M wd/ wn thin in appearance.   BP: 132/92  HR: 86 bpm.   Pt sitting to the side of the bed eating ice.   Skin: Acyanotic, cool dry and intact.   Resp: CTA without adventitious sounds.   CV: S1S2  Chest: Symmetrical, equal lung expansion.  Pt with barrel shaped chest 2/2 mild kyphosis. Pain was not reproducible upon palpation of the chest.   Abdomen: Soft, nd/nt (+) BS x 4  Extremities:  (+) edema of bilateral lower extremities.  Pt was seen by his Cardiologist earlier. Pt was complaining of pain in the calf region of his LLE.       Impression:   Unstable Angina vs. Dyspepsia.     Plan:   Discussed finding with his Cardiologist.   Nitroglycerin given x 1 dose with immediate relief noted.   Pt is on Isordil 10 mg tid -- will continue.   Heart Failure Team consulted to evaluate for Cardiomems.   Will continue telemetry monitoring.   Dopplers of LE to eval for DVT.    Celestina Baumann ANP-BC  Spectralink #[id number]

## 2025-03-31 NOTE — PROGRESS NOTE ADULT - SUBJECTIVE AND OBJECTIVE BOX
SUBJECTIVE:  The patient denies chest pain, shortness of breath, arm pain or jaw pain, dizziness or palpitations.  LE edema improved.    MEDICATIONS  (STANDING):  aspirin  chewable 81 milliGRAM(s) Oral daily  buMETAnide Infusion 1 mG/Hr (5 mL/Hr) IV Continuous <Continuous>  dextrose 5%. 1000 milliLiter(s) (100 mL/Hr) IV Continuous <Continuous>  dextrose 5%. 1000 milliLiter(s) (50 mL/Hr) IV Continuous <Continuous>  dextrose 50% Injectable 25 Gram(s) IV Push once  dextrose 50% Injectable 12.5 Gram(s) IV Push once  dextrose 50% Injectable 25 Gram(s) IV Push once  glucagon  Injectable 1 milliGRAM(s) IntraMuscular once  insulin glargine Injectable (LANTUS) 20 Unit(s) SubCutaneous at bedtime  insulin lispro (ADMELOG) corrective regimen sliding scale   SubCutaneous three times a day before meals  insulin lispro (ADMELOG) corrective regimen sliding scale   SubCutaneous at bedtime  insulin lispro Injectable (ADMELOG) 9 Unit(s) SubCutaneous three times a day before meals  isosorbide   dinitrate Tablet (ISORDIL) 10 milliGRAM(s) Oral three times a day  metoprolol succinate ER 75 milliGRAM(s) Oral daily  rivaroxaban 15 milliGRAM(s) Oral with dinner  sodium bicarbonate 650 milliGRAM(s) Oral three times a day  spironolactone 25 milliGRAM(s) Oral daily    MEDICATIONS  (PRN):  acetaminophen     Tablet .. 650 milliGRAM(s) Oral every 6 hours PRN Temp greater or equal to 38C (100.4F), Mild Pain (1 - 3)  aluminum hydroxide/magnesium hydroxide/simethicone Suspension 30 milliLiter(s) Oral every 4 hours PRN Dyspepsia  dextrose Oral Gel 15 Gram(s) Oral once PRN Blood Glucose LESS THAN 70 milliGRAM(s)/deciliter  melatonin 3 milliGRAM(s) Oral at bedtime PRN Insomnia  nitroglycerin     SubLingual 0.4 milliGRAM(s) SubLingual every 5 minutes PRN Chest Pain  ondansetron Injectable 4 milliGRAM(s) IV Push every 8 hours PRN Nausea and/or Vomiting  oxyCODONE    IR 2.5 milliGRAM(s) Oral every 6 hours PRN Severe Pain (7 - 10)        REVIEW OF SYSTEMS:    CONSTITUTIONAL: No fever, weight loss, or fatigue  EYES: No eye pain, visual disturbances, or discharge  NECK: No pain or stiffness  RESPIRATORY: No cough, wheezing, chills or hemoptysis; No Shortness of Breath  CARDIOVASCULAR: Notes bilateral leg edema  GASTROINTESTINAL: No abdominal or epigastric pain. No nausea, vomiting, or hematemesis; No diarrhea or constipation. No melena or hematochezia.  GENITOURINARY: No dysuria, frequency, hematuria, or incontinence  NEUROLOGICAL: No headaches, memory loss, loss of strength, numbness, or tremors  SKIN: No itching, burning, rashes, or lesions   LYMPH Nodes: No enlarged glands  MUSCULOSKELETAL: No joint pain or swelling; No muscle, back, or extremity pain  All other review of systems are negative.      PHYSICAL EXAM:  T(F): 97.6 (03-31-25 @ 04:51), Max: 98 (03-30-25 @ 20:34)  HR: 96 (03-31-25 @ 04:51) (83 - 96)  BP: 131/84 (03-31-25 @ 04:51) (125/73 - 139/80)  RR: 18 (03-31-25 @ 04:51) (18 - 18)  SpO2: 98% (03-31-25 @ 04:51) (96% - 99%)  Wt(kg): --  ,   I&O's Summary    30 Mar 2025 07:01  -  31 Mar 2025 07:00  --------------------------------------------------------  IN: 300 mL / OUT: 2300 mL / NET: -2000 mL            PHYSICAL EXAM    Appearance: Normal	  HEENT:   Normal oral mucosa, PERRL, EOMI	  NECK: Soft and supple, No LAD, No JVD  Cardiovascular: Regular Rate and Rhythm, Normal S1 S2, No murmurs, No clicks, gallops or rubs  Respiratory: Lungs clear to auscultation	  Skin: No rashes, No ecchymoses, No cyanosis  Neurologic: Non-focal  Extremities: mild to moderate bilateral distal edema    LABS:	 	                            15.8   10.12 )-----------( 320      ( 29 Mar 2025 07:06 )             48.2     03-30    137  |  91[L]  |  74[H]  ----------------------------<  134[H]  3.8   |  27  |  2.35[H]    Ca    9.8      30 Mar 2025 06:40  Mg     2.9     03-30  --------------------------------------------------------------------------              03-31    135  |  90[L]  |  76[H]  ----------------------------<  223[H]  3.6   |  24  |  2.23[H]    Ca    9.9      31 Mar 2025 06:42  Mg     2.9     03-30    TPro  7.4  /  Alb  4.0  /  TBili  1.0  /  DBili  x   /  AST  29  /  ALT  21  /  AlkPhos  181[H]  03-31           CARDIAC MARKERS ( 30 Mar 2025 17:52 )  x     / x     / x     / x     / 7.5 ng/mL              Urinalysis Basic - ( 31 Mar 2025 06:42 )    Color: x / Appearance: x / SG: x / pH: x  Gluc: 223 mg/dL / Ketone: x  / Bili: x / Urobili: x   Blood: x / Protein: x / Nitrite: x   Leuk Esterase: x / RBC: x / WBC x   Sq Epi: x / Non Sq Epi: x / Bacteria: x

## 2025-03-31 NOTE — CONSULT NOTE ADULT - NS ATTEND AMEND GEN_ALL_CORE FT
Mr Velázquez is a 70 yo male with hx of HFmrEF, and PMH of CAD (hx of multiple stents, occluded 100% dLAD not amenable to PCI), Afib (on xarelto), T2DM, CKD4, HTN, known PAD s/p recent LLE with PT and peroneal stent placement (1/23), is here today for SOB and BLE for ADHF and also for L foot wound and HF consulted for further management. Patient has recent admission 1/31 to 2/7 for ADHF.     Cardiac Studies:  [facility] 2/4/25: occluded dLAD, D1 100% stenosis  -1/29/24: patient prior stents in the LM, LAD and Cx, severe disease dLAD in a smaller caliber sized vessel unchanged from prior [facility] that is not amenable to PCI   TTE 1/20/25: LVIDd 3.7cm, LVEF 40-45%, RV nml size, borderline reduced RV function, TAPSE 1.1 cm, mildly dilated LA, dilated RA, mild/mod MR, mild TR, PASP 49, IVC nml  -TTE 6/24/24: LVIDd 3.8cm, LVEF 45-50%, nml RV size/function, nml atria, mild MR, mild TR.      Assessment:   Acute on chronic systolic HF exacerbation  Heart failure with mid range EF  Ischemic cardiomyopathy  CAD s/p multiple stents  Diabetes mellitus type 2  Chronic kidney disease (creatinine 1.3 in Jan 2025 --> 2 at present)  Permanent atrial fibrillation/flutter  Mild to moderate mitral regurgitation  Peripheral arterial disease      Plan:   Agree with continuing bumex drip at 1 mg/hr.   Still volume overloaded on exam.  GDMT: Toprol 75 daily. Will uptitrate once more euvolemic. Continue aldactone 25 daily.   Other GDMT limited by renal dysfunction. Will continue to reassess.  Increase isordil to 20 mg TID.   Discussed with patient regarding cardiomems and would be a good candidate.   We will plan for cardiomems implant on Friday if patient is agreeable.   We will reassess LV function once more euvolemic. Last echo noted from January 2025.

## 2025-03-31 NOTE — PROGRESS NOTE ADULT - PROBLEM SELECTOR PLAN 1
Will increase Lantus to 23 units at bed time.  Will increase Admelog to 9 units before each meal in addition to Admelog correction scale coverage.  Patient counseled for compliance with consistent low carb diet and physical activity as tolerated.  .

## 2025-03-31 NOTE — CONSULT NOTE ADULT - PROBLEM SELECTOR PROBLEM 1
Type 2 diabetes mellitus
Arterial insufficiency with ischemic ulcer
Acute decompensated heart failure

## 2025-03-31 NOTE — CONSULT NOTE ADULT - PROBLEM SELECTOR RECOMMENDATION 2
On medications,  stable, monitored and followed up by primary team/cardiology team
-hx of multiple stents, dLAD 100% occluded  -on ASA   -can benefit of taking ranexa for anti-anginal - will defer it to cardiology
I Tyrone Calle MD have participated in the daily care of this patient and have performed a history and physical exam of the patient and discussed  the findings and plan with the house officer. I reviewed the resident note and agree with the findings and plan   I Tyrone Calle MD have personally seen and examined the patient at bedside

## 2025-03-31 NOTE — CONSULT NOTE ADULT - SUBJECTIVE AND OBJECTIVE BOX
HPI:  69M PMHx CAD s/p PCI, HFrEF, afib, HTN, T2DM, CKD3. Was recently admitted - ADHF, also found w/ diminshed pulse b/l feet with foot wound, seen by vasc, consistent w/ PAD, no acute surgical mgnt.   Pt returns today w/ SOB and BLE swelling, as well as left foot wound that's worsened since last admission.   Seen by nephro here, rec bumex gtt.   Seen by vasc here, no acute surgical intervention, pods c/s for wound care.  (26 Mar 2025 21:04)      PAST MEDICAL & SURGICAL HISTORY:  Former smoker      CAD in native artery      Type 2 diabetes mellitus      Hyperlipidemia, unspecified hyperlipidemia type      Essential hypertension, hypertension with unspecified goal      Afib      Hematoma of leg      Stented coronary artery      CHF (congestive heart failure)      s/p Carotid Endarterectomy  left          Review of Systems:  14 point ROS done and found to be negative or noncontributory other than noted in HPI.    MEDICATIONS  (STANDING):  aspirin  chewable 81 milliGRAM(s) Oral daily  buMETAnide Infusion 1 mG/Hr (5 mL/Hr) IV Continuous <Continuous>  dextrose 5%. 1000 milliLiter(s) (100 mL/Hr) IV Continuous <Continuous>  dextrose 5%. 1000 milliLiter(s) (50 mL/Hr) IV Continuous <Continuous>  dextrose 50% Injectable 25 Gram(s) IV Push once  dextrose 50% Injectable 12.5 Gram(s) IV Push once  dextrose 50% Injectable 25 Gram(s) IV Push once  glucagon  Injectable 1 milliGRAM(s) IntraMuscular once  insulin glargine Injectable (LANTUS) 20 Unit(s) SubCutaneous at bedtime  insulin lispro (ADMELOG) corrective regimen sliding scale   SubCutaneous three times a day before meals  insulin lispro (ADMELOG) corrective regimen sliding scale   SubCutaneous at bedtime  insulin lispro Injectable (ADMELOG) 9 Unit(s) SubCutaneous three times a day before meals  isosorbide   dinitrate Tablet (ISORDIL) 10 milliGRAM(s) Oral three times a day  metoprolol succinate ER 75 milliGRAM(s) Oral daily  rivaroxaban 15 milliGRAM(s) Oral with dinner  sodium bicarbonate 650 milliGRAM(s) Oral three times a day  spironolactone 25 milliGRAM(s) Oral daily    MEDICATIONS  (PRN):  acetaminophen     Tablet .. 650 milliGRAM(s) Oral every 6 hours PRN Temp greater or equal to 38C (100.4F), Mild Pain (1 - 3)  aluminum hydroxide/magnesium hydroxide/simethicone Suspension 30 milliLiter(s) Oral every 4 hours PRN Dyspepsia  dextrose Oral Gel 15 Gram(s) Oral once PRN Blood Glucose LESS THAN 70 milliGRAM(s)/deciliter  melatonin 3 milliGRAM(s) Oral at bedtime PRN Insomnia  nitroglycerin     SubLingual 0.4 milliGRAM(s) SubLingual every 5 minutes PRN Chest Pain  ondansetron Injectable 4 milliGRAM(s) IV Push every 8 hours PRN Nausea and/or Vomiting  oxyCODONE    IR 2.5 milliGRAM(s) Oral every 6 hours PRN Severe Pain (7 - 10)      HOME MEDICATIONS:    Allergies    atorvastatin (Muscle Pain (Severe))    Intolerances        SOCIAL HISTORY:    FAMILY HISTORY:  Family history of heart disease  father  age 71    FH: atrial fibrillation  sister        Vital Signs Last 24 Hrs  T(C): 36.3 (31 Mar 2025 11:26), Max: 36.7 (30 Mar 2025 20:34)  T(F): 97.4 (31 Mar 2025 11:26), Max: 98 (30 Mar 2025 20:34)  HR: 89 (31 Mar 2025 11:26) (89 - 96)  BP: 174/84 (31 Mar 2025 11:26) (125/73 - 174/84)  BP(mean): --  RR: 18 (31 Mar 2025 11:26) (18 - 18)  SpO2: 98% (31 Mar 2025 11:26) (96% - 99%)    Parameters below as of 31 Mar 2025 11:26  Patient On (Oxygen Delivery Method): room air        Tele:    General: No distress. Comfortable.  HEENT: EOM intact.  Neck: Neck supple. JVP not elevated. No masses  Chest: Clear to auscultation bilaterally  CV: Normal S1 and S2. No murmurs, rub, or gallops. Radial pulses normal.  Abdomen: Soft, non-distended, non-tender  Skin: No rashes or skin breakdown  Neurology: Alert and oriented times three. Sensation intact  Psych: Affect normal    LABS:        135  |  90[L]  |  76[H]  ----------------------------<  223[H]  3.6   |  24  |  2.23[H]    Ca    9.9      31 Mar 2025 06:42  Mg     2.9         TPro  7.4  /  Alb  4.0  /  TBili  1.0  /  DBili  x   /  AST  29  /  ALT  21  /  AlkPhos  181[H]        Urinalysis Basic - ( 31 Mar 2025 06:42 )    Color: x / Appearance: x / SG: x / pH: x  Gluc: 223 mg/dL / Ketone: x  / Bili: x / Urobili: x   Blood: x / Protein: x / Nitrite: x   Leuk Esterase: x / RBC: x / WBC x   Sq Epi: x / Non Sq Epi: x / Bacteria: x        RADIOLOGY & ADDITIONAL STUDIES: HPI:  69M PMHx CAD s/p PCI, HFrEF, afib, HTN, T2DM, CKD3. Was recently admitted - ADHF, also found w/ diminshed pulse b/l feet with foot wound, seen by vasc, consistent w/ PAD, no acute surgical mgnt.   Pt returns today w/ SOB and BLE swelling, as well as left foot wound that's worsened since last admission.   Seen by nephro here, rec bumex gtt.   Seen by vasc here, no acute surgical intervention, pods c/s for wound care.  (26 Mar 2025 21:04)      PAST MEDICAL & SURGICAL HISTORY:  Former smoker      CAD in native artery      Type 2 diabetes mellitus      Hyperlipidemia, unspecified hyperlipidemia type      Essential hypertension, hypertension with unspecified goal      Afib      Hematoma of leg      Stented coronary artery      CHF (congestive heart failure)      s/p Carotid Endarterectomy  left          Review of Systems:  14 point ROS done and found to be negative or noncontributory other than noted in HPI.    MEDICATIONS  (STANDING):  aspirin  chewable 81 milliGRAM(s) Oral daily  buMETAnide Infusion 1 mG/Hr (5 mL/Hr) IV Continuous <Continuous>  dextrose 5%. 1000 milliLiter(s) (100 mL/Hr) IV Continuous <Continuous>  dextrose 5%. 1000 milliLiter(s) (50 mL/Hr) IV Continuous <Continuous>  dextrose 50% Injectable 25 Gram(s) IV Push once  dextrose 50% Injectable 12.5 Gram(s) IV Push once  dextrose 50% Injectable 25 Gram(s) IV Push once  glucagon  Injectable 1 milliGRAM(s) IntraMuscular once  insulin glargine Injectable (LANTUS) 20 Unit(s) SubCutaneous at bedtime  insulin lispro (ADMELOG) corrective regimen sliding scale   SubCutaneous three times a day before meals  insulin lispro (ADMELOG) corrective regimen sliding scale   SubCutaneous at bedtime  insulin lispro Injectable (ADMELOG) 9 Unit(s) SubCutaneous three times a day before meals  isosorbide   dinitrate Tablet (ISORDIL) 10 milliGRAM(s) Oral three times a day  metoprolol succinate ER 75 milliGRAM(s) Oral daily  rivaroxaban 15 milliGRAM(s) Oral with dinner  sodium bicarbonate 650 milliGRAM(s) Oral three times a day  spironolactone 25 milliGRAM(s) Oral daily    MEDICATIONS  (PRN):  acetaminophen     Tablet .. 650 milliGRAM(s) Oral every 6 hours PRN Temp greater or equal to 38C (100.4F), Mild Pain (1 - 3)  aluminum hydroxide/magnesium hydroxide/simethicone Suspension 30 milliLiter(s) Oral every 4 hours PRN Dyspepsia  dextrose Oral Gel 15 Gram(s) Oral once PRN Blood Glucose LESS THAN 70 milliGRAM(s)/deciliter  melatonin 3 milliGRAM(s) Oral at bedtime PRN Insomnia  nitroglycerin     SubLingual 0.4 milliGRAM(s) SubLingual every 5 minutes PRN Chest Pain  ondansetron Injectable 4 milliGRAM(s) IV Push every 8 hours PRN Nausea and/or Vomiting  oxyCODONE    IR 2.5 milliGRAM(s) Oral every 6 hours PRN Severe Pain (7 - 10)      HOME MEDICATIONS:    Allergies    atorvastatin (Muscle Pain (Severe))    Intolerances        SOCIAL HISTORY:    FAMILY HISTORY:  Family history of heart disease  father  age 71    FH: atrial fibrillation  sister        Vital Signs Last 24 Hrs  T(C): 36.3 (31 Mar 2025 11:26), Max: 36.7 (30 Mar 2025 20:34)  T(F): 97.4 (31 Mar 2025 11:26), Max: 98 (30 Mar 2025 20:34)  HR: 89 (31 Mar 2025 11:26) (89 - 96)  BP: 174/84 (31 Mar 2025 11:26) (125/73 - 174/84)  BP(mean): --  RR: 18 (31 Mar 2025 11:26) (18 - 18)  SpO2: 98% (31 Mar 2025 11:26) (96% - 99%)    Parameters below as of 31 Mar 2025 11:26  Patient On (Oxygen Delivery Method): room air        Tele: Afib 80-90s    General: No distress. Comfortable.  HEENT: EOM intact.  Neck: Neck supple. JVP grossly elevated  Chest: Clear to auscultation bilaterally  CV: Normal S1 and S2. No murmurs, rub, or gallops. Radial pulses normal.  Abdomen: Soft, non-distended, non-tender  Skin: No rashes or skin breakdown  Extremities: +BLE edema  Neurology: Alert and oriented times three. Sensation intact  Psych: Affect normal    LABS:        135  |  90[L]  |  76[H]  ----------------------------<  223[H]  3.6   |  24  |  2.23[H]    Ca    9.9      31 Mar 2025 06:42  Mg     2.9         TPro  7.4  /  Alb  4.0  /  TBili  1.0  /  DBili  x   /  AST  29  /  ALT  21  /  AlkPhos  181[H]        Urinalysis Basic - ( 31 Mar 2025 06:42 )    Color: x / Appearance: x / SG: x / pH: x  Gluc: 223 mg/dL / Ketone: x  / Bili: x / Urobili: x   Blood: x / Protein: x / Nitrite: x   Leuk Esterase: x / RBC: x / WBC x   Sq Epi: x / Non Sq Epi: x / Bacteria: x        RADIOLOGY & ADDITIONAL STUDIES:

## 2025-03-31 NOTE — CONSULT NOTE ADULT - PROBLEM SELECTOR RECOMMENDATION 3
-on xarelto 15mg QD and BB  -monitor on tele
On medications,  stable, monitored and followed up by cardiology team

## 2025-03-31 NOTE — PROGRESS NOTE ADULT - ASSESSMENT
Assessment  DMT2: 70y Male with DM T2 with hyperglycemia admitted with SOB,  patient was on oral hypoglycemic agents at home, now on  insulin coverage, blood sugars are still running high.  Patient is high risk with high level decision making due to uncontrolled diabetes, has increased risk for complications.   CAD: on medications, monitored, no acute events..  HTN: On antihypertensive medications, monitored, stable..          Myrna Rossi MD  Cell:  [phone]  Office: [phone]

## 2025-04-01 NOTE — CHART NOTE - NSCHARTNOTEFT_GEN_A_CORE
NUTRITION FOLLOW UP NOTE    PATIENT SEEN FOR: malnutrition follow up on 3dsu    SOURCE: [x] Patient  [x] Current Medical Record  [] RN  [] Family/support person at bedside  [] Patient unavailable/inappropriate  [] Other:    CHART REVIEWED/EVENTS NOTED.  [] No changes to nutrition care plan to note  [] Nutrition Status:    DIET ORDER:   Diet, DASH/TLC:   Sodium & Cholesterol Restricted  Consistent Carbohydrate {No Snacks} (CSTCHO)  1500mL Fluid Restriction ([id number])  Supplement Feeding Modality:  Oral  Suplena Cans or Servings Per Day:  1       Frequency:  Daily (25)      CURRENT DIET ORDER IS:  [] Appropriate:  [] Inadequate:  [x] Other: pt requesting more food.     NUTRITION INTAKE/PROVISION:  [x] PO: %  [] Enteral Nutrition:  [] Parenteral Nutrition:    ANTHROPOMETRICS:  Drug Dosing Weight  Height (cm): 167.6 (26 Mar 2025 14:06)  Weight (kg): 81.6 (26 Mar 2025 14:06)  BMI (kg/m2): 29 (26 Mar 2025 14:06)  BSA (m2): 1.91 (26 Mar 2025 14:06)  Weights:   Daily Weight in k.2 (), Weight in k.5 (), Weight in k (), Weight in k.7 (), Weight in k.3 ()     NUTRITIONALLY PERTINENT MEDICATIONS:  MEDICATIONS  (STANDING):  buMETAnide Infusion  dextrose 5%.  dextrose 5%.  dextrose 50% Injectable  dextrose 50% Injectable  dextrose 50% Injectable  glucagon  Injectable  insulin glargine Injectable (LANTUS)  insulin lispro (ADMELOG) corrective regimen sliding scale  insulin lispro (ADMELOG) corrective regimen sliding scale  insulin lispro Injectable (ADMELOG)  isosorbide   dinitrate Tablet (ISORDIL)  metoprolol succinate ER  spironolactone       NUTRITIONALLY PERTINENT LABS:   Na136 mmol/L Glu 146 mg/dL[H] K+ 4.0 mmol/L Cr  2.00 mg/dL[H] BUN 74 mg/dL[H]  Phos 4.3 mg/dL  Alb 4.0 g/dL  Chol 116 mg/dL LDL --    HDL 46 mg/dL Trig 83 mg/dL ALT 21 U/L AST 29 U/L Alkaline Phosphatase 181 U/L[H]  25 @ 07:01 a1c 8.6    A1C with Estimated Average Glucose Result: 8.6 % (25 @ 07:01)  A1C with Estimated Average Glucose Result: 6.5 % (24 @ 06:21)  A1C with Estimated Average Glucose Result: 6.4 % (24 @ 06:44)          Finger Sticks:  POCT Blood Glucose.: 167 mg/dL ( @ 08:40)  POCT Blood Glucose.: 268 mg/dL ( @ 21:01)  POCT Blood Glucose.: 218 mg/dL ( @ 17:20)  POCT Blood Glucose.: 318 mg/dL ( @ 13:37)      NUTRITIONALLY PERTINENT MEDICATIONS/LABS:  [x] Reviewed  [] Relevant notes on medications/labs:    EDEMA:  [x] Reviewed  [x] Relevant notes: +2 bilateral legs and left foot    GI/ I&O:  [x] Reviewed  [] Relevant notes:  [x] Other: last BM 2 days ago as per nurse, pt agrees to continue prunes at lunch    SKIN:   [] No pressure injuries documented, per nursing flowsheet  [] Pressure injury previously noted  [] Change in pressure injury documentation:  [x] Other: sacrum and right buttock-no stage indicated in flow sheet    ESTIMATED NEEDS:  [x] No change:  [] Updated:  Energy: 3173-0691  kcal/day (30-35kcal/kg)  Protein: 67-78  g/day (1.2-1.4g/kg)  Fluid:   ml/day or [x] defer to team  Based on: current weight on 3/28 (55.6kg)    NUTRITION DIAGNOSIS:  [x] Prior Dx: severe malnutrition, increased nutrient needs  [] New Dx:    EDUCATION:  [x] Yes: reinforced the need to restrict protein secondary to renal dysfunction and inability to increase carbohydrate allowance due to elevated blood sugars  [] Not appropriate/warranted    NUTRITION CARE PLAN:  1. Diet: change diet to consistent carbohydrate with snack/1500 fluid/low sodium  2. Supplements: increase Suplena to 2 x daily  3. Multivitamin/mineral supplementation: add Nephro-daniel daily  4: continue stewed prunes    [] Achieved - Continue current nutrition intervention(s)  [] Current medical condition precludes nutrition intervention at this time.    MONITORING AND EVALUATION:   RD remains available upon request and will follow up per protocol.    Name Khalida uHnt MA, EDD, CDN/TEAMS   Available on [facility] TEAMS

## 2025-04-01 NOTE — PROGRESS NOTE ADULT - ASSESSMENT
Assessment  DMT2: 70y Male with DM T2 with hyperglycemia admitted with SOB,  patient was on oral hypoglycemic agents at home, now on  insulin coverage, blood sugars are still running high.  Patient is high risk with high level decision making due to uncontrolled diabetes, has increased risk for complications.   CAD: on medications, monitored, no acute events..  HTN: On antihypertensive medications, monitored, stable..      Discussed plan and management with Dr Flo Jamil NP - TEAMS  Myrna Rossi MD  Cell: [phone]  Office: [phone]      Assessment  DMT2: 70y Male with DM T2 with hyperglycemia admitted with SOB,  patient was on oral hypoglycemic agents at home, now on  insulin coverage, blood sugars are still running high.  Patient is high risk with high level decision making due to uncontrolled diabetes, has increased risk for complications.   CAD: on medications, monitored, no acute events..  HTN: On antihypertensive medications, monitored, stable..      Discussed plan and management with Dr Flo Jamil NP - TEAMS  Myrna Rossi MD  Cell: [phone]  Office: [phone]

## 2025-04-01 NOTE — PROGRESS NOTE ADULT - SUBJECTIVE AND OBJECTIVE BOX
Chief complaint  Patient is a 70y old  Male who presents with a chief complaint of Other disorders of soft tissue     (28 Mar 2025 10:54)         Labs and Fingersticks  CAPILLARY BLOOD GLUCOSE      POCT Blood Glucose.: 265 mg/dL (01 Apr 2025 12:57)  POCT Blood Glucose.: 167 mg/dL (01 Apr 2025 08:40)  POCT Blood Glucose.: 268 mg/dL (31 Mar 2025 21:01)  POCT Blood Glucose.: 218 mg/dL (31 Mar 2025 17:20)      Anion Gap: 17 (04-01 @ 05:18)  Anion Gap: 21 *H* (03-31 @ 06:42)  Anion Gap: 17 (03-30 @ 17:52)      Calcium: 10.0 (04-01 @ 05:18)  Calcium: 9.9 (03-31 @ 06:42)  Calcium: 9.7 (03-30 @ 17:52)  Albumin: 4.0 (03-31 @ 06:42)    Alanine Aminotransferase (ALT/SGPT): 21 (03-31 @ 06:42)  Alkaline Phosphatase: 181 *H* (03-31 @ 06:42)  Aspartate Aminotransferase (AST/SGOT): 29 (03-31 @ 06:42)        04-01    136  |  90[L]  |  74[H]  ----------------------------<  146[H]  4.0   |  29  |  2.00[H]    Ca    10.0      01 Apr 2025 05:18    TPro  7.4  /  Alb  4.0  /  TBili  1.0  /  DBili  x   /  AST  29  /  ALT  21  /  AlkPhos  181[H]  03-31    Medications  MEDICATIONS  (STANDING):  aspirin  chewable 81 milliGRAM(s) Oral daily  buMETAnide Infusion 1 mG/Hr (5 mL/Hr) IV Continuous <Continuous>  dextrose 5%. 1000 milliLiter(s) (100 mL/Hr) IV Continuous <Continuous>  dextrose 5%. 1000 milliLiter(s) (50 mL/Hr) IV Continuous <Continuous>  dextrose 50% Injectable 25 Gram(s) IV Push once  dextrose 50% Injectable 12.5 Gram(s) IV Push once  dextrose 50% Injectable 25 Gram(s) IV Push once  glucagon  Injectable 1 milliGRAM(s) IntraMuscular once  insulin glargine Injectable (LANTUS) 22 Unit(s) SubCutaneous at bedtime  insulin lispro (ADMELOG) corrective regimen sliding scale   SubCutaneous three times a day before meals  insulin lispro (ADMELOG) corrective regimen sliding scale   SubCutaneous at bedtime  insulin lispro Injectable (ADMELOG) 10 Unit(s) SubCutaneous three times a day before meals  isosorbide   dinitrate Tablet (ISORDIL) 20 milliGRAM(s) Oral three times a day  metoprolol succinate ER 75 milliGRAM(s) Oral daily  Nephro-daniel 1 Tablet(s) Oral daily  rivaroxaban 15 milliGRAM(s) Oral with dinner  spironolactone 25 milliGRAM(s) Oral daily      Physical Exam  General: Patient comfortable in bed   Vital Signs Last 12 Hrs  T(F): 97.8 (04-01-25 @ 12:06), Max: 97.8 (04-01-25 @ 12:06)  HR: 87 (04-01-25 @ 12:06) (87 - 87)  BP: 130/74 (04-01-25 @ 12:06) (130/74 - 130/74)  BP(mean): 93 (04-01-25 @ 12:06) (93 - 93)  RR: 18 (04-01-25 @ 12:06) (18 - 18)  SpO2: 95% (04-01-25 @ 12:06) (95% - 95%)    CVS: S1S2   Respiratory: No wheezing, no crepitations  GI: Abdomen soft, bowel sounds positive  Musculoskeletal:  moves all extremities  : Voiding        Chief complaint  Patient is a 70y old  Male who presents with a chief complaint of Other disorders of soft tissue     (28 Mar 2025 10:54)         Labs and Fingersticks  CAPILLARY BLOOD GLUCOSE      POCT Blood Glucose.: 265 mg/dL (01 Apr 2025 12:57)  POCT Blood Glucose.: 167 mg/dL (01 Apr 2025 08:40)  POCT Blood Glucose.: 268 mg/dL (31 Mar 2025 21:01)  POCT Blood Glucose.: 218 mg/dL (31 Mar 2025 17:20)      Anion Gap: 17 (04-01 @ 05:18)  Anion Gap: 21 *H* (03-31 @ 06:42)  Anion Gap: 17 (03-30 @ 17:52)      Calcium: 10.0 (04-01 @ 05:18)  Calcium: 9.9 (03-31 @ 06:42)  Calcium: 9.7 (03-30 @ 17:52)  Albumin: 4.0 (03-31 @ 06:42)    Alanine Aminotransferase (ALT/SGPT): 21 (03-31 @ 06:42)  Alkaline Phosphatase: 181 *H* (03-31 @ 06:42)  Aspartate Aminotransferase (AST/SGOT): 29 (03-31 @ 06:42)        04-01    136  |  90[L]  |  74[H]  ----------------------------<  146[H]  4.0   |  29  |  2.00[H]    Ca    10.0      01 Apr 2025 05:18    TPro  7.4  /  Alb  4.0  /  TBili  1.0  /  DBili  x   /  AST  29  /  ALT  21  /  AlkPhos  181[H]  03-31    Medications  MEDICATIONS  (STANDING):  aspirin  chewable 81 milliGRAM(s) Oral daily  buMETAnide Infusion 1 mG/Hr (5 mL/Hr) IV Continuous <Continuous>  dextrose 5%. 1000 milliLiter(s) (100 mL/Hr) IV Continuous <Continuous>  dextrose 5%. 1000 milliLiter(s) (50 mL/Hr) IV Continuous <Continuous>  dextrose 50% Injectable 25 Gram(s) IV Push once  dextrose 50% Injectable 12.5 Gram(s) IV Push once  dextrose 50% Injectable 25 Gram(s) IV Push once  glucagon  Injectable 1 milliGRAM(s) IntraMuscular once  insulin glargine Injectable (LANTUS) 22 Unit(s) SubCutaneous at bedtime  insulin lispro (ADMELOG) corrective regimen sliding scale   SubCutaneous three times a day before meals  insulin lispro (ADMELOG) corrective regimen sliding scale   SubCutaneous at bedtime  insulin lispro Injectable (ADMELOG) 10 Unit(s) SubCutaneous three times a day before meals  isosorbide   dinitrate Tablet (ISORDIL) 20 milliGRAM(s) Oral three times a day  metoprolol succinate ER 75 milliGRAM(s) Oral daily  Nephro-daniel 1 Tablet(s) Oral daily  rivaroxaban 15 milliGRAM(s) Oral with dinner  spironolactone 25 milliGRAM(s) Oral daily      Physical Exam  General: Patient comfortable in bed   Vital Signs Last 12 Hrs  T(F): 97.8 (04-01-25 @ 12:06), Max: 97.8 (04-01-25 @ 12:06)  HR: 87 (04-01-25 @ 12:06) (87 - 87)  BP: 130/74 (04-01-25 @ 12:06) (130/74 - 130/74)  BP(mean): 93 (04-01-25 @ 12:06) (93 - 93)  RR: 18 (04-01-25 @ 12:06) (18 - 18)  SpO2: 95% (04-01-25 @ 12:06) (95% - 95%)    CVS: S1S2   Respiratory: No wheezing, no crepitations  GI: Abdomen soft, bowel sounds positive  Musculoskeletal:  moves all extremities  : Voiding

## 2025-04-01 NOTE — PROGRESS NOTE ADULT - SUBJECTIVE AND OBJECTIVE BOX
Date of service: 25 @ 14:53      Patient is a 70y old  Male who presents with a chief complaint of Other disorders of soft tissue     (28 Mar 2025 10:54)                                                               INTERVAL HPI/OVERNIGHT EVENTS:    REVIEW OF SYSTEMS:    no cp   nosob                                                                                                                                                                                                                                                           Medications:  MEDICATIONS  (STANDING):  aspirin  chewable 81 milliGRAM(s) Oral daily  buMETAnide Infusion 1 mG/Hr (5 mL/Hr) IV Continuous <Continuous>  dextrose 5%. 1000 milliLiter(s) (50 mL/Hr) IV Continuous <Continuous>  dextrose 5%. 1000 milliLiter(s) (100 mL/Hr) IV Continuous <Continuous>  dextrose 50% Injectable 25 Gram(s) IV Push once  dextrose 50% Injectable 12.5 Gram(s) IV Push once  dextrose 50% Injectable 25 Gram(s) IV Push once  glucagon  Injectable 1 milliGRAM(s) IntraMuscular once  insulin glargine Injectable (LANTUS) 22 Unit(s) SubCutaneous at bedtime  insulin lispro (ADMELOG) corrective regimen sliding scale   SubCutaneous three times a day before meals  insulin lispro (ADMELOG) corrective regimen sliding scale   SubCutaneous at bedtime  insulin lispro Injectable (ADMELOG) 10 Unit(s) SubCutaneous three times a day before meals  isosorbide   dinitrate Tablet (ISORDIL) 20 milliGRAM(s) Oral three times a day  metoprolol succinate ER 75 milliGRAM(s) Oral daily  Nephro-daniel 1 Tablet(s) Oral daily  rivaroxaban 15 milliGRAM(s) Oral with dinner  spironolactone 25 milliGRAM(s) Oral daily    MEDICATIONS  (PRN):  acetaminophen     Tablet .. 650 milliGRAM(s) Oral every 6 hours PRN Temp greater or equal to 38C (100.4F), Mild Pain (1 - 3)  aluminum hydroxide/magnesium hydroxide/simethicone Suspension 30 milliLiter(s) Oral every 4 hours PRN Dyspepsia  dextrose Oral Gel 15 Gram(s) Oral once PRN Blood Glucose LESS THAN 70 milliGRAM(s)/deciliter  melatonin 3 milliGRAM(s) Oral at bedtime PRN Insomnia  nitroglycerin     SubLingual 0.4 milliGRAM(s) SubLingual every 5 minutes PRN Chest Pain  ondansetron Injectable 4 milliGRAM(s) IV Push every 8 hours PRN Nausea and/or Vomiting  oxyCODONE    IR 2.5 milliGRAM(s) Oral once PRN Severe Pain (7 - 10)       Allergies    atorvastatin (Muscle Pain (Severe))    Intolerances      Vital Signs Last 24 Hrs  T(C): 36.6 (2025 12:06), Max: 36.7 (31 Mar 2025 20:20)  T(F): 97.8 (2025 12:06), Max: 98 (31 Mar 2025 20:20)  HR: 87 (2025 12:06) (62 - 89)  BP: 130/74 (2025 12:06) (130/74 - 161/82)  BP(mean): 93 (2025 12:06) (93 - 93)  RR: 18 (2025 12:06) (18 - 18)  SpO2: 95% (2025 12:06) (95% - 97%)    Parameters below as of 2025 12:06  Patient On (Oxygen Delivery Method): room air      CAPILLARY BLOOD GLUCOSE      POCT Blood Glucose.: 265 mg/dL (2025 12:57)  POCT Blood Glucose.: 167 mg/dL (2025 08:40)  POCT Blood Glucose.: 268 mg/dL (31 Mar 2025 21:01)  POCT Blood Glucose.: 218 mg/dL (31 Mar 2025 17:20)       @ 07:01  -  04- @ 07:00  --------------------------------------------------------  IN: 180 mL / OUT: 3050 mL / NET: -2870 mL      Physical Exam:    Daily     Daily Weight in k.2 (2025 05:16)  General:  Well appearing, NAD, not cachetic  HEENT:  Nonicteric, PERRLA  CV:  RRR, S1S2   Lungs:  CTA B/L, no wheezes, rales, rhonchi  Abdomen:  Soft, non-tender, no distended, positive BS  Extremities:  edema                                                                                                                                                                                                                                                                                     LABS:                                136  |  90[L]  |  74[H]  ----------------------------<  146[H]  4.0   |  29  |  2.00[H]    Ca    10.0      2025 05:18    TPro  7.4  /  Alb  4.0  /  TBili  1.0  /  DBili  x   /  AST  29  /  ALT  21  /  AlkPhos  181[H]  03                       RADIOLOGY & ADDITIONAL TESTS         I personally reviewed: [  ]EKG   [  ]CXR    [  ] CT      A/P:         Discussed with :     Derek consultants' Notes   Time spent :

## 2025-04-01 NOTE — PROGRESS NOTE ADULT - ASSESSMENT
·  Problem: Acute decompensated heart failure. :  questionable compliance   ·  Plan: -c/w bumex gtt.   monitor Cr   d/w  : check Va duplex:   Negatvie   HF consult noted .. cont drip and planned  cardioMEMs        ·  Problem: Wound of left foot: Left dorsal foot ulceration: Stable, noninfected, present on admission  local wound care per pod       ·  Problem: Type 2 diabetes mellitus.: uncontrolled  A1c  worse than in the past hence questionable complaince   endo consulted     ·  Problem: Afib.  ·  Plan: -c/w xarelto and toprol.    ·  Problem: CAD (coronary artery disease).  ·  Plan: -c/w asa 81mg       Stage 4 chronic kidney disease.   ·  Plan: -stable. eval by nephro.   -dose meds per renal fx.

## 2025-04-01 NOTE — PROGRESS NOTE ADULT - SUBJECTIVE AND OBJECTIVE BOX
SUBJECTIVE: Resting comfortably. Remains on bumex drip. Heart failure note and recommendations noted.   	  MEDICATIONS:  buMETAnide Infusion 1 mG/Hr IV Continuous <Continuous>  isosorbide   dinitrate Tablet (ISORDIL) 20 milliGRAM(s) Oral three times a day  metoprolol succinate ER 75 milliGRAM(s) Oral daily  nitroglycerin     SubLingual 0.4 milliGRAM(s) SubLingual every 5 minutes PRN  spironolactone 25 milliGRAM(s) Oral daily  acetaminophen     Tablet .. 650 milliGRAM(s) Oral every 6 hours PRN  melatonin 3 milliGRAM(s) Oral at bedtime PRN  ondansetron Injectable 4 milliGRAM(s) IV Push every 8 hours PRN  oxyCODONE    IR 2.5 milliGRAM(s) Oral once PRN  aluminum hydroxide/magnesium hydroxide/simethicone Suspension 30 milliLiter(s) Oral every 4 hours PRN  dextrose 50% Injectable 25 Gram(s) IV Push once  dextrose 50% Injectable 12.5 Gram(s) IV Push once  dextrose 50% Injectable 25 Gram(s) IV Push once  dextrose Oral Gel 15 Gram(s) Oral once PRN  glucagon  Injectable 1 milliGRAM(s) IntraMuscular once  insulin glargine Injectable (LANTUS) 22 Unit(s) SubCutaneous at bedtime  insulin lispro (ADMELOG) corrective regimen sliding scale   SubCutaneous three times a day before meals  insulin lispro (ADMELOG) corrective regimen sliding scale   SubCutaneous at bedtime  insulin lispro Injectable (ADMELOG) 10 Unit(s) SubCutaneous three times a day before meals  aspirin  chewable 81 milliGRAM(s) Oral daily  dextrose 5%. 1000 milliLiter(s) IV Continuous <Continuous>  dextrose 5%. 1000 milliLiter(s) IV Continuous <Continuous>  rivaroxaban 15 milliGRAM(s) Oral with dinner  sodium bicarbonate 650 milliGRAM(s) Oral three times a day      REVIEW OF SYSTEMS:    CONSTITUTIONAL: No fever, weight loss, or fatigue  EYES: No eye pain, visual disturbances, or discharge  NECK: No pain or stiffness  RESPIRATORY: No cough, wheezing, chills or hemoptysis; No Shortness of Breath  CARDIOVASCULAR: No chest pain, palpitations, dizziness, or leg swelling  GASTROINTESTINAL: No abdominal or epigastric pain. No nausea, vomiting, or hematemesis; No diarrhea or constipation. No melena or hematochezia.  GENITOURINARY: No dysuria, frequency, hematuria, or incontinence  NEUROLOGICAL: No headaches, memory loss, loss of strength, numbness, or tremors  SKIN: No itching, burning, rashes, or lesions   LYMPH Nodes: No enlarged glands  MUSCULOSKELETAL: No joint pain or swelling; No muscle, back, or extremity pain  All other review of systems are negative.  	    PHYSICAL EXAM:  T(C): 36.3 (04-01-25 @ 04:30), Max: 36.7 (03-31-25 @ 20:20)  HR: 89 (04-01-25 @ 04:30) (62 - 89)  BP: 151/82 (04-01-25 @ 04:30) (148/87 - 174/84)  RR: 18 (04-01-25 @ 04:30) (18 - 18)  SpO2: 97% (04-01-25 @ 04:30) (97% - 98%)  Wt(kg): --  I&O's Summary    31 Mar 2025 07:01  -  01 Apr 2025 07:00  --------------------------------------------------------  IN: 180 mL / OUT: 3050 mL / NET: -2870 mL          PHYSICAL EXAM    Appearance: Normal	  HEENT:   Normal oral mucosa, PERRL, EOMI	  NECK: Soft and supple, No LAD, No JVD  Cardiovascular: Regular Rate and Rhythm, Normal S1 S2, No murmurs, No clicks, gallops or rubs  Respiratory: Lungs clear to auscultation	  Extremities: mild bilateral edema    LABS:	 	      04-01    136  |  90[L]  |  74[H]  ----------------------------<  146[H]  4.0   |  29  |  2.00[H]    Ca    10.0      01 Apr 2025 05:18    TPro  7.4  /  Alb  4.0  /  TBili  1.0  /  DBili  x   /  AST  29  /  ALT  21  /  AlkPhos  181[H]  03-31

## 2025-04-01 NOTE — PROGRESS NOTE ADULT - ASSESSMENT
#h/o ashd s/p multiple pci, last pci dLAD 4/17/24 by Dr ILDA Dominguez, known severe inoperable CAD, not amenable to intervention.  #HTN  #DM (Dr Reyes)  #ckd (Dr Christophe Walton)  #s/p cea by Dr Calle  # hfref exacerbation  #AF , remains on xarelto.   #s/p Left foot infection   #s/p RSV   Continue diuresis. Close follow up renal function and electrolytes.   Agree with med changes recommended by heart failure.  Tentative cardio MEMS later this week.   Slow improvement.

## 2025-04-01 NOTE — PROGRESS NOTE ADULT - PROBLEM SELECTOR PLAN 1
Will increase Lantus to 22 units at bed time.  Will increase Admelog to 10 units before each meal in addition to Admelog correction scale coverage.  Patient counseled for compliance with consistent low carb diet and physical activity as tolerated.

## 2025-04-01 NOTE — PROGRESS NOTE ADULT - SUBJECTIVE AND OBJECTIVE BOX
on room air   afebrile  denies any acute complaints  bumex gtt @1/h for now. 2L out Cr stable and actually improved. doing well today.     acetaminophen     Tablet .. 650 milliGRAM(s) Oral every 6 hours PRN  aluminum hydroxide/magnesium hydroxide/simethicone Suspension 30 milliLiter(s) Oral every 4 hours PRN  aspirin  chewable 81 milliGRAM(s) Oral daily  buMETAnide Infusion 1 mG/Hr IV Continuous <Continuous>  dextrose 5%. 1000 milliLiter(s) IV Continuous <Continuous>  dextrose 5%. 1000 milliLiter(s) IV Continuous <Continuous>  dextrose 50% Injectable 25 Gram(s) IV Push once  dextrose 50% Injectable 12.5 Gram(s) IV Push once  dextrose 50% Injectable 25 Gram(s) IV Push once  dextrose Oral Gel 15 Gram(s) Oral once PRN  glucagon  Injectable 1 milliGRAM(s) IntraMuscular once  insulin glargine Injectable (LANTUS) 17 Unit(s) SubCutaneous at bedtime  insulin lispro (ADMELOG) corrective regimen sliding scale   SubCutaneous three times a day before meals  insulin lispro (ADMELOG) corrective regimen sliding scale   SubCutaneous at bedtime  insulin lispro Injectable (ADMELOG) 8 Unit(s) SubCutaneous three times a day before meals  isosorbide   dinitrate Tablet (ISORDIL) 10 milliGRAM(s) Oral three times a day  melatonin 3 milliGRAM(s) Oral at bedtime PRN  metoprolol succinate ER 75 milliGRAM(s) Oral daily  nitroglycerin     SubLingual 0.4 milliGRAM(s) SubLingual every 5 minutes PRN  ondansetron Injectable 4 milliGRAM(s) IV Push every 8 hours PRN  oxyCODONE    IR 2.5 milliGRAM(s) Oral every 6 hours PRN  rivaroxaban 15 milliGRAM(s) Oral with dinner  sodium bicarbonate 650 milliGRAM(s) Oral three times a day  spironolactone 25 milliGRAM(s) Oral daily      VITAL:  T(C): , Max: 36.7 (03-30-25 @ 04:21)  T(F): , Max: 98 (03-30-25 @ 04:21)  HR: 96 (03-30-25 @ 04:21)  BP: 129/79 (03-30-25 @ 04:21)  BP(mean): --  RR: 18 (03-30-25 @ 04:21)  SpO2: 98% (03-30-25 @ 04:21)  Wt(kg): --    03-29-25 @ 07:01  -  03-30-25 @ 07:00  --------------------------------------------------------  IN: 1375 mL / OUT: 3175 mL / NET: -1800 mL    03-30-25 @ 07:01  -  03-30-25 @ 10:36  --------------------------------------------------------  IN: 100 mL / OUT: 600 mL / NET: -500 mL        PHYSICAL EXAM:  Constitutional: NAD, Alert  HEENT: NCAT, MMM  Neck: Supple, No JVD  Respiratory: CTA-b/l  Cardiovascular: RRR s1s2, no m/r/g  Gastrointestinal: BS+, soft, NT/ND  Extremities: 1+ b/l LE edema  Neurological: no focal deficits; strength grossly intact  Back: no CVAT b/l  Skin: No rashes, no nevi    LABS:                          15.8   10.12 )-----------( 320      ( 29 Mar 2025 07:06 )             48.2     Na(137)/K(3.8)/Cl(91)/HCO3(27)/BUN(74)/Cr(2.35)Glu(134)/Ca(9.8)/Mg(2.9)/PO4(--)    03-30 @ 06:40  Na(132)/K(4.4)/Cl(90)/HCO3(23)/BUN(71)/Cr(2.44)Glu(133)/Ca(9.6)/Mg(--)/PO4(--)    03-29 @ 07:06  Na(133)/K(4.4)/Cl(91)/HCO3(24)/BUN(70)/Cr(2.66)Glu(127)/Ca(9.5)/Mg(2.7)/PO4(--)    03-28 @ 22:58  Na(136)/K(3.3)/Cl(91)/HCO3(23)/BUN(61)/Cr(2.33)Glu(192)/Ca(9.7)/Mg(2.8)/PO4(--)    03-28 @ 07:11    Urinalysis Basic - ( 30 Mar 2025 06:40 )    Color: x / Appearance: x / SG: x / pH: x  Gluc: 134 mg/dL / Ketone: x  / Bili: x / Urobili: x   Blood: x / Protein: x / Nitrite: x   Leuk Esterase: x / RBC: x / WBC x   Sq Epi: x / Non Sq Epi: x / Bacteria: x                IMPRESSION: 70M w/ HTN, DM2, CAD, HFrEF, and CKD4, 3/26/25 p/w ADHF    (1)CKD - 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 - improving renal fxn  (2)Lytes- controlled  (3)Hypermagnesemia  (4)CV - acute on chronic HFrEF - on Bumex gtt @1, stable Cr. CHF consulted for possible mems placement (planned friday)    RECOMMEND:  (1) Bumex 1mg/h gtt per cardiology  (2) mems placement planned for friday with CHF team  (3)good candidate for entresto/ace/arb when RF is recovered for further BP control  (3)Continue strict I/Os  (4)Dose new meds for GFR 35-40ml/min  (5)BMP+Mg at least daily    Bran Velásquez DO   [employer]  [phone]     on room air   afebrile  denies any acute complaints  bumex gtt @1/h for now. 2L out Cr stable and actually improved. doing well today.     acetaminophen     Tablet .. 650 milliGRAM(s) Oral every 6 hours PRN  aluminum hydroxide/magnesium hydroxide/simethicone Suspension 30 milliLiter(s) Oral every 4 hours PRN  aspirin  chewable 81 milliGRAM(s) Oral daily  buMETAnide Infusion 1 mG/Hr IV Continuous <Continuous>  dextrose 5%. 1000 milliLiter(s) IV Continuous <Continuous>  dextrose 5%. 1000 milliLiter(s) IV Continuous <Continuous>  dextrose 50% Injectable 25 Gram(s) IV Push once  dextrose 50% Injectable 12.5 Gram(s) IV Push once  dextrose 50% Injectable 25 Gram(s) IV Push once  dextrose Oral Gel 15 Gram(s) Oral once PRN  glucagon  Injectable 1 milliGRAM(s) IntraMuscular once  insulin glargine Injectable (LANTUS) 17 Unit(s) SubCutaneous at bedtime  insulin lispro (ADMELOG) corrective regimen sliding scale   SubCutaneous three times a day before meals  insulin lispro (ADMELOG) corrective regimen sliding scale   SubCutaneous at bedtime  insulin lispro Injectable (ADMELOG) 8 Unit(s) SubCutaneous three times a day before meals  isosorbide   dinitrate Tablet (ISORDIL) 10 milliGRAM(s) Oral three times a day  melatonin 3 milliGRAM(s) Oral at bedtime PRN  metoprolol succinate ER 75 milliGRAM(s) Oral daily  nitroglycerin     SubLingual 0.4 milliGRAM(s) SubLingual every 5 minutes PRN  ondansetron Injectable 4 milliGRAM(s) IV Push every 8 hours PRN  oxyCODONE    IR 2.5 milliGRAM(s) Oral every 6 hours PRN  rivaroxaban 15 milliGRAM(s) Oral with dinner  sodium bicarbonate 650 milliGRAM(s) Oral three times a day  spironolactone 25 milliGRAM(s) Oral daily      VITAL:  T(C): , Max: 36.7 (03-30-25 @ 04:21)  T(F): , Max: 98 (03-30-25 @ 04:21)  HR: 96 (03-30-25 @ 04:21)  BP: 129/79 (03-30-25 @ 04:21)  BP(mean): --  RR: 18 (03-30-25 @ 04:21)  SpO2: 98% (03-30-25 @ 04:21)  Wt(kg): --    03-29-25 @ 07:01  -  03-30-25 @ 07:00  --------------------------------------------------------  IN: 1375 mL / OUT: 3175 mL / NET: -1800 mL    03-30-25 @ 07:01  -  03-30-25 @ 10:36  --------------------------------------------------------  IN: 100 mL / OUT: 600 mL / NET: -500 mL        PHYSICAL EXAM:  Constitutional: NAD, Alert  HEENT: NCAT, MMM  Neck: Supple, No JVD  Respiratory: CTA-b/l  Cardiovascular: RRR s1s2, no m/r/g  Gastrointestinal: BS+, soft, NT/ND  Extremities: 1+ b/l LE edema  Neurological: no focal deficits; strength grossly intact  Back: no CVAT b/l  Skin: No rashes, no nevi    LABS:                          15.8   10.12 )-----------( 320      ( 29 Mar 2025 07:06 )             48.2     Na(137)/K(3.8)/Cl(91)/HCO3(27)/BUN(74)/Cr(2.35)Glu(134)/Ca(9.8)/Mg(2.9)/PO4(--)    03-30 @ 06:40  Na(132)/K(4.4)/Cl(90)/HCO3(23)/BUN(71)/Cr(2.44)Glu(133)/Ca(9.6)/Mg(--)/PO4(--)    03-29 @ 07:06  Na(133)/K(4.4)/Cl(91)/HCO3(24)/BUN(70)/Cr(2.66)Glu(127)/Ca(9.5)/Mg(2.7)/PO4(--)    03-28 @ 22:58  Na(136)/K(3.3)/Cl(91)/HCO3(23)/BUN(61)/Cr(2.33)Glu(192)/Ca(9.7)/Mg(2.8)/PO4(--)    03-28 @ 07:11    Urinalysis Basic - ( 30 Mar 2025 06:40 )    Color: x / Appearance: x / SG: x / pH: x  Gluc: 134 mg/dL / Ketone: x  / Bili: x / Urobili: x   Blood: x / Protein: x / Nitrite: x   Leuk Esterase: x / RBC: x / WBC x   Sq Epi: x / Non Sq Epi: x / Bacteria: x                IMPRESSION: 70M w/ HTN, DM2, CAD, HFrEF, and CKD4, 3/26/25 p/w ADHF    (1)CKD - 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 - improving renal fxn  (2)Lytes- controlled  (3)Hypermagnesemia  (4)CV - acute on chronic HFrEF - on Bumex gtt @1, stable Cr. CHF consulted for possible mems placement (planned friday)    RECOMMEND:  (1) Bumex 1mg/h gtt per cardiology  (2) mems placement planned for friday with CHF team  (2) stop bicarb tabs  (3)good candidate for entresto/ace/arb when RF is recovered for further BP control  (3)Continue strict I/Os  (4)Dose new meds for GFR 35-40ml/min  (5)BMP+Mg at least daily    Bran Velásquez [employer]  [phone]

## 2025-04-02 NOTE — PROGRESS NOTE ADULT - PROBLEM SELECTOR PLAN 1
Lantus 22 units at bed time.  Admelog 10 units before each meal in addition to Admelog correction scale coverage.  Patient counseled for compliance with consistent low carb diet and physical activity as tolerated.

## 2025-04-02 NOTE — PHYSICAL THERAPY INITIAL EVALUATION ADULT - ADDITIONAL COMMENTS
Pt resides in private home with spouse & mother in law, 6 steps to enter, flight within, PTA independent with mobility and ADL's, owns RW, (+)driving, retired.

## 2025-04-02 NOTE — PROGRESS NOTE ADULT - SUBJECTIVE AND OBJECTIVE BOX
NEPHROLOGY     Patient seen and examined sitting on bed having lunch, reports feeling well, denies sob, no pain, in no acute distress.     MEDICATIONS  (STANDING):  AQUAPHOR (petrolatum Ointment) 1 Application(s) Topical two times a day  aspirin  chewable 81 milliGRAM(s) Oral daily  buMETAnide Infusion 1 mG/Hr (5 mL/Hr) IV Continuous <Continuous>  dextrose 5%. 1000 milliLiter(s) (50 mL/Hr) IV Continuous <Continuous>  dextrose 5%. 1000 milliLiter(s) (100 mL/Hr) IV Continuous <Continuous>  dextrose 50% Injectable 25 Gram(s) IV Push once  dextrose 50% Injectable 12.5 Gram(s) IV Push once  dextrose 50% Injectable 25 Gram(s) IV Push once  glucagon  Injectable 1 milliGRAM(s) IntraMuscular once  insulin glargine Injectable (LANTUS) 22 Unit(s) SubCutaneous at bedtime  insulin lispro (ADMELOG) corrective regimen sliding scale   SubCutaneous three times a day before meals  insulin lispro (ADMELOG) corrective regimen sliding scale   SubCutaneous at bedtime  insulin lispro Injectable (ADMELOG) 10 Unit(s) SubCutaneous three times a day before meals  isosorbide   dinitrate Tablet (ISORDIL) 20 milliGRAM(s) Oral three times a day  metoprolol succinate ER 75 milliGRAM(s) Oral daily  Nephro-daniel 1 Tablet(s) Oral daily  spironolactone 25 milliGRAM(s) Oral daily    VITALS:  T(C): , Max: 36.8 (04-02-25 @ 11:26)  T(F): , Max: 98.2 (04-02-25 @ 11:26)  HR: 78 (04-02-25 @ 11:26)  BP: 156/87 (04-02-25 @ 11:26)  BP(mean): --  RR: 18 (04-02-25 @ 11:26)  SpO2: 96% (04-02-25 @ 11:26)  Wt(kg): --  I and O's:    04-01 @ 07:01  -  04-02 @ 07:00  --------------------------------------------------------  IN: 60 mL / OUT: 1700 mL / NET: -1640 mL    PHYSICAL EXAM:  Constitutional: NAD, Alert  HEENT: NCAT, MMM  Neck: Supple, No JVD  Respiratory: CTA-b/l  Cardiovascular: RRR s1s2, no m/r/g  Gastrointestinal: BS+, soft, NT/ND  Extremities: 1+ b/l LE edema  Neurological: no focal deficits; strength grossly intact  Back: no CVAT b/l  Skin: No rashes, no nevi    LABS:    04-02    134[L]  |  88[L]  |  77[H]  ----------------------------<  135[H]  3.8   |  27  |  2.08[H]    Ca    9.7      02 Apr 2025 06:42  Phos  4.1     04-02  Mg     2.9     04-02

## 2025-04-02 NOTE — PROGRESS NOTE ADULT - ASSESSMENT
·  Problem: Acute decompensated heart failure. :  questionable compliance   ·  Plan: -c/w bumex gtt.   monitor Cr    check Va duplex:   Dileep arnold with HF team   .. cont drip and planned  cardioMEMs    ·  Problem: Wound of left foot: Left dorsal foot ulceration: Stable, noninfected, present on admission  local wound care per pod       ·  Problem: Type 2 diabetes mellitus.: uncontrolled  A1c  worse than in the past hence questionable complaince   endo consulted     ·  Problem: Afib.  ·  Plan: -c/w xarelto and toprol.: ???  need to stop xarelto for cardioMems placement     ·  Problem: CAD (coronary artery disease).  ·  Plan: -c/w asa 81mg       Stage 4 chronic kidney disease.   ·  Plan: -stable. eval by nephro.   -dose meds per renal fx.

## 2025-04-02 NOTE — PHYSICAL THERAPY INITIAL EVALUATION ADULT - THERAPY FREQUENCY, PT EVAL
Med Requested:    Disp Refills Start End    traZODone (DESYREL) 50 MG tablet 60 tablet 1 2/8/2023     Sig: TAKE HALF TO TWO AT NIGHT AS NEEDED FOR SLEEP          Last visit: 04/13/2023  Recommended Follow Up: 27 days   No Show/Cancel: 05/10/2023  Next visit: Visit date not found     FAILED PROTOCOL - Routed to provider. Orders have been prepped according to providers note.    
1-2x/week

## 2025-04-02 NOTE — PROGRESS NOTE ADULT - SUBJECTIVE AND OBJECTIVE BOX
Date of service: 25 @ 13:40      Patient is a 70y old  Male who presents with a chief complaint of CHF (2025 09:35)                                                               INTERVAL HPI/OVERNIGHT EVENTS:    REVIEW OF SYSTEMS:     no cp no sob                                                                                                                                                                                                                                                                            Medications:  MEDICATIONS  (STANDING):  AQUAPHOR (petrolatum Ointment) 1 Application(s) Topical two times a day  aspirin  chewable 81 milliGRAM(s) Oral daily  buMETAnide Infusion 1 mG/Hr (5 mL/Hr) IV Continuous <Continuous>  dextrose 5%. 1000 milliLiter(s) (50 mL/Hr) IV Continuous <Continuous>  dextrose 5%. 1000 milliLiter(s) (100 mL/Hr) IV Continuous <Continuous>  dextrose 50% Injectable 25 Gram(s) IV Push once  dextrose 50% Injectable 12.5 Gram(s) IV Push once  dextrose 50% Injectable 25 Gram(s) IV Push once  glucagon  Injectable 1 milliGRAM(s) IntraMuscular once  insulin glargine Injectable (LANTUS) 22 Unit(s) SubCutaneous at bedtime  insulin lispro (ADMELOG) corrective regimen sliding scale   SubCutaneous three times a day before meals  insulin lispro (ADMELOG) corrective regimen sliding scale   SubCutaneous at bedtime  insulin lispro Injectable (ADMELOG) 10 Unit(s) SubCutaneous three times a day before meals  isosorbide   dinitrate Tablet (ISORDIL) 20 milliGRAM(s) Oral three times a day  metoprolol succinate ER 75 milliGRAM(s) Oral daily  Nephro-daniel 1 Tablet(s) Oral daily  rivaroxaban 15 milliGRAM(s) Oral with dinner  spironolactone 25 milliGRAM(s) Oral daily    MEDICATIONS  (PRN):  acetaminophen     Tablet .. 650 milliGRAM(s) Oral every 6 hours PRN Temp greater or equal to 38C (100.4F), Mild Pain (1 - 3)  aluminum hydroxide/magnesium hydroxide/simethicone Suspension 30 milliLiter(s) Oral every 4 hours PRN Dyspepsia  dextrose Oral Gel 15 Gram(s) Oral once PRN Blood Glucose LESS THAN 70 milliGRAM(s)/deciliter  melatonin 3 milliGRAM(s) Oral at bedtime PRN Insomnia  nitroglycerin     SubLingual 0.4 milliGRAM(s) SubLingual every 5 minutes PRN Chest Pain  ondansetron Injectable 4 milliGRAM(s) IV Push every 8 hours PRN Nausea and/or Vomiting       Allergies    atorvastatin (Muscle Pain (Severe))    Intolerances      Vital Signs Last 24 Hrs  T(C): 36.8 (:), Max: 36.8 (:)  T(F): 98.2 (:), Max: 98.2 (:)  HR: 78 () (78 - 94)  BP: 156/87 () (115/78 - 157/72)  BP(mean): --  RR: 18 () (18 - 18)  SpO2: 96% () (96% - 99%)    Parameters below as of   Patient On (Oxygen Delivery Method): room air      CAPILLARY BLOOD GLUCOSE      POCT Blood Glucose.: 293 mg/dL (2025 12:31)  POCT Blood Glucose.: 171 mg/dL (2025 08:46)  POCT Blood Glucose.: 247 mg/dL (2025 21:32)  POCT Blood Glucose.: 267 mg/dL (2025 17:34)       @ : @ 07:00  --------------------------------------------------------  IN: 60 mL / OUT: 1700 mL / NET: -1640 mL     @ 07: @ 13:40  --------------------------------------------------------  IN: 0 mL / OUT: 600 mL / NET: -600 mL      Physical Exam:    Daily     Daily Weight in k.6 (2025 04:39)  General:  NAD   HEENT:  Nonicteric, PERRLA  CV:  RRR, S1S2   Lungs:  CTA B/L, no wheezes, rales, rhonchi  Abdomen:  Soft, non-tender, no distended, positive BS  Extremities:  no edema                                                                                                                                                                                                                                                                                       LABS:                                134[L]  |  88[L]  |  77[H]  ----------------------------<  135[H]  3.8   |  27  |  2.08[H]    Ca    9.7      2025 06:42  Phos  4.1       Mg     2.9                              RADIOLOGY & ADDITIONAL TESTS         I personally reviewed: [  ]EKG   [  ]CXR    [  ] CT      A/P:         Discussed with :     Derek consultants' Notes   Time spent :

## 2025-04-02 NOTE — PHYSICAL THERAPY INITIAL EVALUATION ADULT - GAIT DEVIATIONS NOTED, PT EVAL
decreased gio/increased time in double stance/decreased velocity of limb motion/decreased step length/decreased stride length/decreased weight-shifting ability

## 2025-04-02 NOTE — PHYSICAL THERAPY INITIAL EVALUATION ADULT - PLANNED THERAPY INTERVENTIONS, PT EVAL
GOALS: Pt will negotiate 12 steps with handrail & step to pattern with independence in 2wks/bed mobility training/gait training/transfer training

## 2025-04-02 NOTE — PROGRESS NOTE ADULT - SUBJECTIVE AND OBJECTIVE BOX
Chief complaint  Patient is a 70y old  Male who presents with a chief complaint of CHF (02 Apr 2025 09:35)         Labs and Fingersticks  CAPILLARY BLOOD GLUCOSE      POCT Blood Glucose.: 293 mg/dL (02 Apr 2025 12:31)  POCT Blood Glucose.: 171 mg/dL (02 Apr 2025 08:46)  POCT Blood Glucose.: 247 mg/dL (01 Apr 2025 21:32)  POCT Blood Glucose.: 267 mg/dL (01 Apr 2025 17:34)      Anion Gap: 19 *H* (04-02 @ 06:42)  Anion Gap: 17 (04-02 @ 00:43)  Anion Gap: 17 (04-01 @ 05:18)      Calcium: 9.7 (04-02 @ 06:42)  Calcium: 9.8 (04-02 @ 00:43)  Calcium: 10.0 (04-01 @ 05:18)          04-02    134[L]  |  88[L]  |  77[H]  ----------------------------<  135[H]  3.8   |  27  |  2.08[H]    Ca    9.7      02 Apr 2025 06:42  Phos  4.1     04-02  Mg     2.9     04-02      Medications  MEDICATIONS  (STANDING):  AQUAPHOR (petrolatum Ointment) 1 Application(s) Topical two times a day  aspirin  chewable 81 milliGRAM(s) Oral daily  buMETAnide Infusion 1 mG/Hr (5 mL/Hr) IV Continuous <Continuous>  dextrose 5%. 1000 milliLiter(s) (50 mL/Hr) IV Continuous <Continuous>  dextrose 5%. 1000 milliLiter(s) (100 mL/Hr) IV Continuous <Continuous>  dextrose 50% Injectable 25 Gram(s) IV Push once  dextrose 50% Injectable 12.5 Gram(s) IV Push once  dextrose 50% Injectable 25 Gram(s) IV Push once  glucagon  Injectable 1 milliGRAM(s) IntraMuscular once  insulin glargine Injectable (LANTUS) 22 Unit(s) SubCutaneous at bedtime  insulin lispro (ADMELOG) corrective regimen sliding scale   SubCutaneous three times a day before meals  insulin lispro (ADMELOG) corrective regimen sliding scale   SubCutaneous at bedtime  insulin lispro Injectable (ADMELOG) 10 Unit(s) SubCutaneous three times a day before meals  isosorbide   dinitrate Tablet (ISORDIL) 20 milliGRAM(s) Oral three times a day  metoprolol succinate ER 75 milliGRAM(s) Oral daily  Nephro-daniel 1 Tablet(s) Oral daily  spironolactone 25 milliGRAM(s) Oral daily      Physical Exam  General: Patient comfortable in bed   Vital Signs Last 12 Hrs  T(F): 98.2 (04-02-25 @ 11:26), Max: 98.2 (04-02-25 @ 11:26)  HR: 78 (04-02-25 @ 11:26) (78 - 94)  BP: 156/87 (04-02-25 @ 11:26) (115/78 - 156/87)  BP(mean): --  RR: 18 (04-02-25 @ 11:26) (18 - 18)  SpO2: 96% (04-02-25 @ 11:26) (96% - 99%)    CVS: S1S2   Respiratory: No wheezing, no crepitations  GI: Abdomen soft, bowel sounds positive  Musculoskeletal:  moves all extremities  : Voiding        Chief complaint  Patient is a 70y old  Male who presents with a chief complaint of CHF (02 Apr 2025 09:35)         Labs and Fingersticks  CAPILLARY BLOOD GLUCOSE      POCT Blood Glucose.: 293 mg/dL (02 Apr 2025 12:31)  POCT Blood Glucose.: 171 mg/dL (02 Apr 2025 08:46)  POCT Blood Glucose.: 247 mg/dL (01 Apr 2025 21:32)  POCT Blood Glucose.: 267 mg/dL (01 Apr 2025 17:34)      Anion Gap: 19 *H* (04-02 @ 06:42)  Anion Gap: 17 (04-02 @ 00:43)  Anion Gap: 17 (04-01 @ 05:18)      Calcium: 9.7 (04-02 @ 06:42)  Calcium: 9.8 (04-02 @ 00:43)  Calcium: 10.0 (04-01 @ 05:18)          04-02    134[L]  |  88[L]  |  77[H]  ----------------------------<  135[H]  3.8   |  27  |  2.08[H]    Ca    9.7      02 Apr 2025 06:42  Phos  4.1     04-02  Mg     2.9     04-02      Medications  MEDICATIONS  (STANDING):  AQUAPHOR (petrolatum Ointment) 1 Application(s) Topical two times a day  aspirin  chewable 81 milliGRAM(s) Oral daily  buMETAnide Infusion 1 mG/Hr (5 mL/Hr) IV Continuous <Continuous>  dextrose 5%. 1000 milliLiter(s) (50 mL/Hr) IV Continuous <Continuous>  dextrose 5%. 1000 milliLiter(s) (100 mL/Hr) IV Continuous <Continuous>  dextrose 50% Injectable 25 Gram(s) IV Push once  dextrose 50% Injectable 12.5 Gram(s) IV Push once  dextrose 50% Injectable 25 Gram(s) IV Push once  glucagon  Injectable 1 milliGRAM(s) IntraMuscular once  insulin glargine Injectable (LANTUS) 22 Unit(s) SubCutaneous at bedtime  insulin lispro (ADMELOG) corrective regimen sliding scale   SubCutaneous three times a day before meals  insulin lispro (ADMELOG) corrective regimen sliding scale   SubCutaneous at bedtime  insulin lispro Injectable (ADMELOG) 10 Unit(s) SubCutaneous three times a day before meals  isosorbide   dinitrate Tablet (ISORDIL) 20 milliGRAM(s) Oral three times a day  metoprolol succinate ER 75 milliGRAM(s) Oral daily  Nephro-daniel 1 Tablet(s) Oral daily  spironolactone 25 milliGRAM(s) Oral daily      Physical Exam  General: Patient comfortable in bed   Vital Signs Last 12 Hrs  T(F): 98.2 (04-02-25 @ 11:26), Max: 98.2 (04-02-25 @ 11:26)  HR: 78 (04-02-25 @ 11:26) (78 - 94)  BP: 156/87 (04-02-25 @ 11:26) (115/78 - 156/87)  BP(mean): --  RR: 18 (04-02-25 @ 11:26) (18 - 18)  SpO2: 96% (04-02-25 @ 11:26) (96% - 99%)    CVS: S1S2   Respiratory: No wheezing, no crepitations  GI: Abdomen soft, bowel sounds positive  Musculoskeletal:  moves all extremities  : Voiding

## 2025-04-02 NOTE — PROGRESS NOTE ADULT - ASSESSMENT
Assessment  DMT2: 70y Male with DM T2 with hyperglycemia admitted with SOB,  patient was on oral hypoglycemic agents at home, now on  insulin coverage, blood sugars are still running high.  Patient is high risk with high level decision making due to uncontrolled diabetes, has increased risk for complications.   CAD: on medications, monitored, no acute events..  HTN: On antihypertensive medications, monitored, stable..      Discussed plan and management with Dr Flo Jamil NP - TEAMS  Myrna Rossi MD  Cell: [phone]  Office: [phone]    Assessment  DMT2: 70y Male with DM T2 with hyperglycemia admitted with SOB,  patient was on oral hypoglycemic agents at home, now on  insulin coverage, blood sugars are still running high.  Patient is high risk with high level decision making due to uncontrolled diabetes, has increased risk for complications.   CAD: on medications, monitored, no acute events..  HTN: On antihypertensive medications, monitored, stable..      Discussed plan and management with Dr Flo Jamil NP - TEAMS  Myrna Rossi MD  Cell: [phone]  Office: [phone]

## 2025-04-02 NOTE — PROGRESS NOTE ADULT - SUBJECTIVE AND OBJECTIVE BOX
Remains on IV Bumex at 1 mg/hr  No dyspnea  /87  HR 85  Lungs clear  Irregular rhythm 2/6 systolic murmur  1-2+ edema    BUN 77  Crt 2.08  Na 134    Echo - EF 40-45%  mild-moderate MR  mild AI    Imp:  Persistent fluid overload   The Crt is stable  Re:  Increase Bumex dosing until the Crt is 2.2-2.5  Again discussed with the patient the utility of a Cardio Mems in order to prevent recurrent hospitalizations

## 2025-04-02 NOTE — PHYSICAL THERAPY INITIAL EVALUATION ADULT - GENERAL OBSERVATIONS, REHAB EVAL
received sitting EOB, A&OX4, following commands, willing to participate, a/w SOB, BLE edema, BS reviewed

## 2025-04-02 NOTE — PHYSICAL THERAPY INITIAL EVALUATION ADULT - PERTINENT HX OF CURRENT PROBLEM, REHAB EVAL
Pt is 69M admitted 3/26/25 PMHx CAD s/p PCI, HFrEF, afib, HTN, T2DM, CKD3. Was recently admitted 1/31-2/7 ADHF, also found w/ diminshed pulse b/l feet with foot wound, seen by vasc, consistent w/ PAD, no acute surgical mgnt. Pt returns today w/ SOB and BLE swelling, as well as left foot wound that's worsened since last admission.   Seen by nephro here, rec bumex gtt. Seen by vasc here, no acute surgical intervention, pods c/s for wound care.

## 2025-04-02 NOTE — PROGRESS NOTE ADULT - ASSESSMENT
IMPRESSION: 70M w/ HTN, DM2, CAD, HFrEF, and CKD4, 3/26/25 p/w ADHF    (1)CKD - 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 - improving renal fxn  (2)Lytes- controlled  (3)Hypermagnesemia  (4)CV - acute on chronic HFrEF - on Bumex gtt @1, stable Cr. CHF consulted for possible mems placement (planned friday)    RECOMMEND:  (1) Bumex 1mg/h gtt per cardiology  (2) mems placement planned for friday with CHF team  (3)good candidate for entresto/ace/arb when RF is recovered for further BP control  (4)Continue strict I/Os  (5)Dose new meds for GFR 35-40ml/min  (6)BMP+Mg at least daily    Delilah Foy DNP  [facility]  [phone]

## 2025-04-03 NOTE — PROGRESS NOTE ADULT - ASSESSMENT
·  Problem: Acute decompensated heart failure. :  questionable compliance   ·  Plan: -c/w bumex gtt. : na repeated  and noted   monitor Cr    check Va duplex:   Dileep arnold with HF team   .. cont drip and planned  cardioMEMs    ·  Problem: Wound of left foot: Left dorsal foot ulceration: Stable, noninfected, present on admission  local wound care per pod       ·  Problem: Type 2 diabetes mellitus.: uncontrolled  A1c  worse than in the past hence questionable complaince   endo consulted     ·  Problem: Afib.  ·  Plan: -c/w xarelto and toprol.: ???  need to stop xarelto for cardioMems placement     ·  Problem: CAD (coronary artery disease).  ·  Plan: -c/w asa 81mg       Stage 4 chronic kidney disease.   ·  Plan: -stable. eval by nephro.   -dose meds per renal fx.

## 2025-04-03 NOTE — PROGRESS NOTE ADULT - ASSESSMENT
Assessment/plan:    Left dorsal foot ulceration: Stable, noninfected, present on admission  Diabetes mellitus/neuropathy  Diabetes rule out PVD    Vascular consultation noted and reviewed.  Continue ammonium lactate lotion to heels daily.  Continue decubitus precautions with use of Z flow boots while in house.  Continue with normal saline cleanse with Adaptic touch Acticoat and DSD to the left foot daily.  Will continue to monitor for signs of infection and/or wound care recommendations while in house.  Patient follow-up with Dr. Reaves at [phone] after discharge for continued ulcer management.

## 2025-04-03 NOTE — PROGRESS NOTE ADULT - SUBJECTIVE AND OBJECTIVE BOX
Podiatry pager #: [phone]/ [phone]    Patient is a 70y old  Male who presents with a chief complaint of CHF (2025 13:54)Podiatry seen today for follow-up of lower extremity wound       INTERVAL HPI/OVERNIGHT EVENTS:  Patient seen and evaluated at bedside.  Pt is resting comfortable in NAD. Denies N/V/F/C.  Pain rated at X/10    Allergies    atorvastatin (Muscle Pain (Severe))    Intolerances        Vital Signs Last 24 Hrs  T(C): 36.9 (2025 20:32), Max: 36.9 (2025 20:32)  T(F): 98.4 (2025 20:32), Max: 98.4 (2025 20:32)  HR: 85 (2025 20:32) (85 - 87)  BP: 148/82 (2025 20:32) (124/72 - 162/91)  BP(mean): 114 (2025 11:26) (114 - 114)  RR: 18 (2025 20:32) (18 - 18)  SpO2: 99% (2025 20:32) (96% - 99%)    Parameters below as of 2025 20:32  Patient On (Oxygen Delivery Method): room air        acetaminophen     Tablet .. 650 milliGRAM(s) Oral every 6 hours PRN  aluminum hydroxide/magnesium hydroxide/simethicone Suspension 30 milliLiter(s) Oral every 4 hours PRN  AQUAPHOR (petrolatum Ointment) 1 Application(s) Topical two times a day  aspirin  chewable 81 milliGRAM(s) Oral daily  dextrose 5%. 1000 milliLiter(s) IV Continuous <Continuous>  dextrose 5%. 1000 milliLiter(s) IV Continuous <Continuous>  dextrose 50% Injectable 25 Gram(s) IV Push once  dextrose 50% Injectable 12.5 Gram(s) IV Push once  dextrose 50% Injectable 25 Gram(s) IV Push once  dextrose Oral Gel 15 Gram(s) Oral once PRN  glucagon  Injectable 1 milliGRAM(s) IntraMuscular once  insulin glargine Injectable (LANTUS) 12 Unit(s) SubCutaneous at bedtime  insulin lispro (ADMELOG) corrective regimen sliding scale   SubCutaneous three times a day before meals  insulin lispro (ADMELOG) corrective regimen sliding scale   SubCutaneous at bedtime  insulin lispro Injectable (ADMELOG) 12 Unit(s) SubCutaneous three times a day before meals  isosorbide   dinitrate Tablet (ISORDIL) 20 milliGRAM(s) Oral three times a day  melatonin 3 milliGRAM(s) Oral at bedtime PRN  metoprolol succinate ER 75 milliGRAM(s) Oral daily  Nephro-daniel 1 Tablet(s) Oral daily  ondansetron Injectable 4 milliGRAM(s) IV Push every 8 hours PRN  ranolazine 500 milliGRAM(s) Oral two times a day      LABS:                        13.9   9.13  )-----------( 306      ( 2025 06:46 )             41.9     04-03    131[L]  |  84[L]  |  72[H]  ----------------------------<  324[H]  3.8   |  30  |  2.24[H]    Ca    9.5      2025 13:26  Phos  4.1     04-02  Mg     2.8     04-03        Urinalysis Basic - ( 2025 16:20 )    Color: Yellow / Appearance: Clear / S.010 / pH: x  Gluc: x / Ketone: Negative mg/dL  / Bili: Negative / Urobili: 1.0 mg/dL   Blood: x / Protein: Trace mg/dL / Nitrite: Negative   Leuk Esterase: Negative / RBC: x / WBC x   Sq Epi: x / Non Sq Epi: x / Bacteria: x      CAPILLARY BLOOD GLUCOSE      POCT Blood Glucose.: 233 mg/dL (2025 17:23)  POCT Blood Glucose.: 346 mg/dL (2025 12:43)  POCT Blood Glucose.: 186 mg/dL (2025 08:59)      Lower Extremity Physical Exam:  Vasular: DP/PT _n/p, B/L, CFT <_4 seconds B/L, Temperature gradient _wnl, B/L.   Neuro: Epicritic sensation diminished_ to the level of _toes, B/L.  Skin: Bilateral heels fissured xerotic skin.  Dorsal lateral aspect of the left foot positive grade 2 ulceration measuring 4 cm x 3 cm x 0.2 cm in depth.  Positive mixed fibro granular tissue with mild serous drainage.  No active purulence no fluctuance, no malodor no clinical signs of cellulitis.    RADIOLOGY & ADDITIONAL TESTS:

## 2025-04-03 NOTE — PROGRESS NOTE ADULT - PROBLEM SELECTOR PLAN 3
-hx of multiple stents, dLAD 100% occluded  -on ASA   -will benefit from anti anginal therapy given recurrent episodes of chest pain  -Increase Toprol to 100mg XL qd and start Ranexa 500mg BID

## 2025-04-03 NOTE — PROGRESS NOTE ADULT - SUBJECTIVE AND OBJECTIVE BOX
Chief complaint  Patient is a 70y old  Male who presents with a chief complaint of CHF (03 Apr 2025 13:54)         Labs and Fingersticks  CAPILLARY BLOOD GLUCOSE      POCT Blood Glucose.: 346 mg/dL (03 Apr 2025 12:43)  POCT Blood Glucose.: 186 mg/dL (03 Apr 2025 08:59)  POCT Blood Glucose.: 132 mg/dL (02 Apr 2025 21:46)  POCT Blood Glucose.: 118 mg/dL (02 Apr 2025 17:45)      Anion Gap: 17 (04-03 @ 13:26)  Anion Gap: 14 (04-03 @ 06:46)  Anion Gap: 19 *H* (04-02 @ 06:42)  Anion Gap: 17 (04-02 @ 00:43)      Calcium: 9.5 (04-03 @ 13:26)  Calcium: 9.3 (04-03 @ 06:46)  Calcium: 9.7 (04-02 @ 06:42)  Calcium: 9.8 (04-02 @ 00:43)          04-03    131[L]  |  84[L]  |  72[H]  ----------------------------<  324[H]  3.8   |  30  |  2.24[H]    Ca    9.5      03 Apr 2025 13:26  Phos  4.1     04-02  Mg     2.8     04-03                          13.9   9.13  )-----------( 306      ( 03 Apr 2025 06:46 )             41.9     Medications  MEDICATIONS  (STANDING):  AQUAPHOR (petrolatum Ointment) 1 Application(s) Topical two times a day  aspirin  chewable 81 milliGRAM(s) Oral daily  dextrose 5%. 1000 milliLiter(s) (50 mL/Hr) IV Continuous <Continuous>  dextrose 5%. 1000 milliLiter(s) (100 mL/Hr) IV Continuous <Continuous>  dextrose 50% Injectable 25 Gram(s) IV Push once  dextrose 50% Injectable 12.5 Gram(s) IV Push once  dextrose 50% Injectable 25 Gram(s) IV Push once  glucagon  Injectable 1 milliGRAM(s) IntraMuscular once  insulin glargine Injectable (LANTUS) 22 Unit(s) SubCutaneous at bedtime  insulin lispro (ADMELOG) corrective regimen sliding scale   SubCutaneous three times a day before meals  insulin lispro (ADMELOG) corrective regimen sliding scale   SubCutaneous at bedtime  insulin lispro Injectable (ADMELOG) 10 Unit(s) SubCutaneous three times a day before meals  isosorbide   dinitrate Tablet (ISORDIL) 20 milliGRAM(s) Oral three times a day  metoprolol succinate ER 75 milliGRAM(s) Oral daily  Nephro-daniel 1 Tablet(s) Oral daily  ranolazine 500 milliGRAM(s) Oral two times a day      Physical Exam  General: Patient comfortable in bed   Vital Signs Last 12 Hrs  T(F): 98.1 (04-03-25 @ 11:26), Max: 98.1 (04-03-25 @ 06:28)  HR: 87 (04-03-25 @ 11:26) (86 - 87)  BP: 162/91 (04-03-25 @ 11:26) (124/72 - 162/91)  BP(mean): 114 (04-03-25 @ 11:26) (114 - 114)  RR: 18 (04-03-25 @ 11:26) (18 - 18)  SpO2: 96% (04-03-25 @ 11:26) (96% - 98%)    CVS: S1S2   Respiratory: No wheezing, no crepitations  GI: Abdomen soft, bowel sounds positive  Musculoskeletal:  moves all extremities  : Voiding        Chief complaint  Patient is a 70y old  Male who presents with a chief complaint of CHF (03 Apr 2025 13:54)         Labs and Fingersticks  CAPILLARY BLOOD GLUCOSE      POCT Blood Glucose.: 346 mg/dL (03 Apr 2025 12:43)  POCT Blood Glucose.: 186 mg/dL (03 Apr 2025 08:59)  POCT Blood Glucose.: 132 mg/dL (02 Apr 2025 21:46)  POCT Blood Glucose.: 118 mg/dL (02 Apr 2025 17:45)      Anion Gap: 17 (04-03 @ 13:26)  Anion Gap: 14 (04-03 @ 06:46)  Anion Gap: 19 *H* (04-02 @ 06:42)  Anion Gap: 17 (04-02 @ 00:43)      Calcium: 9.5 (04-03 @ 13:26)  Calcium: 9.3 (04-03 @ 06:46)  Calcium: 9.7 (04-02 @ 06:42)  Calcium: 9.8 (04-02 @ 00:43)          04-03    131[L]  |  84[L]  |  72[H]  ----------------------------<  324[H]  3.8   |  30  |  2.24[H]    Ca    9.5      03 Apr 2025 13:26  Phos  4.1     04-02  Mg     2.8     04-03                          13.9   9.13  )-----------( 306      ( 03 Apr 2025 06:46 )             41.9     Medications  MEDICATIONS  (STANDING):  AQUAPHOR (petrolatum Ointment) 1 Application(s) Topical two times a day  aspirin  chewable 81 milliGRAM(s) Oral daily  dextrose 5%. 1000 milliLiter(s) (50 mL/Hr) IV Continuous <Continuous>  dextrose 5%. 1000 milliLiter(s) (100 mL/Hr) IV Continuous <Continuous>  dextrose 50% Injectable 25 Gram(s) IV Push once  dextrose 50% Injectable 12.5 Gram(s) IV Push once  dextrose 50% Injectable 25 Gram(s) IV Push once  glucagon  Injectable 1 milliGRAM(s) IntraMuscular once  insulin glargine Injectable (LANTUS) 22 Unit(s) SubCutaneous at bedtime  insulin lispro (ADMELOG) corrective regimen sliding scale   SubCutaneous three times a day before meals  insulin lispro (ADMELOG) corrective regimen sliding scale   SubCutaneous at bedtime  insulin lispro Injectable (ADMELOG) 10 Unit(s) SubCutaneous three times a day before meals  isosorbide   dinitrate Tablet (ISORDIL) 20 milliGRAM(s) Oral three times a day  metoprolol succinate ER 75 milliGRAM(s) Oral daily  Nephro-daniel 1 Tablet(s) Oral daily  ranolazine 500 milliGRAM(s) Oral two times a day      Physical Exam  General: Patient comfortable in bed   Vital Signs Last 12 Hrs  T(F): 98.1 (04-03-25 @ 11:26), Max: 98.1 (04-03-25 @ 06:28)  HR: 87 (04-03-25 @ 11:26) (86 - 87)  BP: 162/91 (04-03-25 @ 11:26) (124/72 - 162/91)  BP(mean): 114 (04-03-25 @ 11:26) (114 - 114)  RR: 18 (04-03-25 @ 11:26) (18 - 18)  SpO2: 96% (04-03-25 @ 11:26) (96% - 98%)    CVS: S1S2   Respiratory: No wheezing, no crepitations  GI: Abdomen soft, bowel sounds positive  Musculoskeletal:  moves all extremities  : Voiding

## 2025-04-03 NOTE — PROGRESS NOTE ADULT - ASSESSMENT
Outpatient cardiologist: Dr. Alfred Moon     Mr Velázquez is a 70 M, with hx of HFmrEF, and PMH of CAD (hx of multiple stents, occluded 100% dLAD not amenable to PCI), Afib (on xarelto), T2DM, CKD4, HTN, known PAD s/p recent LLE with PT and peroneal stent placement (1/23), is here for SOB and BLE for ADHF and also for L foot wound. Patient has recent admission 1/31 to 2/7 for ADHF.     Patient was treated with Bumex drip for fluid overload and is now approaching euvolemia. Discussed the risks and benefits of CardioMEMS given recurrent hospitalization. Pt is agreeable to undergo CardioMEMS and is scheduled for RHC w/cardioMEMS implant this tomorrow.      Cardiac Studies:  [facility] 2/4/25: occluded dLAD, D1 100% stenosis  -1/29/24: patient prior stents in the LM, LAD and Cx, severe disease dLAD in a smaller caliber sized vessel unchanged from prior [facility] that is not amenable to PCI   TTE 1/20/25: LVIDd 3.7cm, LVEF 40-45%, RV nml size, borderline reduced RV function, TAPSE 1.1 cm, mildly dilated LA, dilated RA, mild/mod MR, mild TR, PASP 49, IVC nml  -TTE 6/24/24: LVIDd 3.8cm, LVEF 45-50%, nml RV size/function, nml atria, mild MR, mild TR,

## 2025-04-03 NOTE — PROGRESS NOTE ADULT - ASSESSMENT
IMPRESSION: 70M w/ HTN, DM2, CAD, HFrEF, and CKD4, 3/26/25 p/w ADHF    (1)CKD - 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 - renal fxn slightly higher   (2)Hyponatremia   (3)Hypermagnesemia  (4)CV - acute on chronic HFrEF - now off Bumex gtt @1, Cr slightly higher. CHF consulted for possible mems placement (planned friday)    RECOMMEND:  (1) a/w holding Bumex 1mg/h gtt  (2) mems placement planned for friday with CHF team  (3)good candidate for entresto/ace/arb when RF is recovered for further BP control  (4)Continue strict I/Os  (5)Dose new meds for GFR 30-35ml/min  (6)BMP+Mg at least daily    Delilah Foy DNP  [employer]  [phone]           IMPRESSION: 70M w/ HTN, DM2, CAD, HFrEF, and CKD4, 3/26/25 p/w ADHF    (1)CKD - 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 - renal fxn slightly higher   (2)Hyponatremia   (3)Hypermagnesemia  (4)CV - acute on chronic HFrEF - now off Bumex gtt @1, Cr slightly higher. CHF consulted for possible mems placement (planned friday)    RECOMMEND:  (1)a/w holding Bumex 1mg/h gtt  (2)Reduce Aldactone to 25 mg po qd   (4)mems placement planned for friday with CHF team  (5)good candidate for entresto/ace/arb when RF is recovered for further BP control  (6)Continue strict I/Os  (7)Dose new meds for GFR 30-35ml/min  (8)Repeat BMP stat, will consider Tolvaptan if repeat sodium lower  (9)Check UA, Urine lytes, urine osm   (0)BMP+Mg at least daily    Delilah Foy DNP  [employer]  [phone]           IMPRESSION: 70M w/ HTN, DM2, CAD, HFrEF, and CKD4, 3/26/25 p/w ADHF    (1)CKD - 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 - renal fxn slightly higher   (2)Hyponatremia   (3)Hypermagnesemia  (4)CV - acute on chronic HFrEF - now off Bumex gtt @1, Cr slightly higher. CHF consulted for possible mems placement (planned friday)    RECOMMEND:  (1)a/w holding Bumex 1mg/h gtt  (2)Reduce Aldactone to 25 mg po qd   (4)mems placement planned for friday with CHF team  (5)good candidate for entresto/ace/arb when RF is recovered for further BP control  (6)Continue strict I/Os  (7)Dose new meds for GFR 30-35ml/min  (8)Repeat BMP stat, will consider Tolvaptan if repeat sodium lower  (9)Check UA, Urine lytes, urine osm   (0)BMP+Mg at least daily    D/w Dr. Velásquez and PETEY Foy, [facility]  [employer]  [phone]

## 2025-04-03 NOTE — PROGRESS NOTE ADULT - ASSESSMENT
Assessment  DMT2: 70y Male with DM T2 with hyperglycemia admitted with SOB,  patient was on oral hypoglycemic agents at home, now on  insulin coverage, blood sugars are still running high.  Patient is high risk with high level decision making due to uncontrolled diabetes, has increased risk for complications.   CAD: on medications, monitored, no acute events.  HTN: On antihypertensive medications, monitored, stable.      Discussed plan and management with Dr Flo Jamil NP - TEAMS  Myrna Rossi MD  Cell: [phone]  Office: [phone]      Assessment  DMT2: 70y Male with DM T2 with hyperglycemia admitted with SOB,  patient was on oral hypoglycemic agents at home, now on  insulin coverage, blood sugars are still running high.  Patient is high risk with high level decision making due to uncontrolled diabetes, has increased risk for complications.   CAD: on medications, monitored, no acute events.  HTN: On antihypertensive medications, monitored, stable.      Discussed plan and management with Dr Flo Jamil NP - TEAMS  Myrna Rossi MD  Cell: [phone]  Office: [phone]

## 2025-04-03 NOTE — PROGRESS NOTE ADULT - SUBJECTIVE AND OBJECTIVE BOX
CHIEF COMPLAINT:  Patient is a 70y old  Male who presents with a chief complaint of CHF (03 Apr 2025 09:21)      INTERVAL HISTORY/OVERNIGHT EVENTS:  Complaining of chest pain this morning. S/p nitro with improvement.     ======================  MEDICATIONS:  AQUAPHOR (petrolatum Ointment) 1 Application(s) Topical two times a day  aspirin  chewable 81 milliGRAM(s) Oral daily  dextrose 50% Injectable 25 Gram(s) IV Push once  dextrose 50% Injectable 12.5 Gram(s) IV Push once  dextrose 50% Injectable 25 Gram(s) IV Push once  glucagon  Injectable 1 milliGRAM(s) IntraMuscular once  insulin glargine Injectable (LANTUS) 22 Unit(s) SubCutaneous at bedtime  insulin lispro (ADMELOG) corrective regimen sliding scale   SubCutaneous three times a day before meals  insulin lispro (ADMELOG) corrective regimen sliding scale   SubCutaneous at bedtime  insulin lispro Injectable (ADMELOG) 10 Unit(s) SubCutaneous three times a day before meals  isosorbide   dinitrate Tablet (ISORDIL) 20 milliGRAM(s) Oral three times a day  metoprolol succinate ER 75 milliGRAM(s) Oral daily  Nephro-daniel 1 Tablet(s) Oral daily    DRIPS:  dextrose 5%. 1000 milliLiter(s) (50 mL/Hr) IV Continuous <Continuous>  dextrose 5%. 1000 milliLiter(s) (100 mL/Hr) IV Continuous <Continuous>      ======================  PHYSICAL EXAMINATION:  GEN:  nad.   HEENT:  eomi. ncat  PULM:  b/l lung sounds   CARD: s1, s2  ABD: +bs. ntnd  EXT:  no new rashes.    NEURO:  no new focal deficits.   ======================  OBJECTIVE:      VS:  T(F): 98.1 (04-03 @ 11:26), Max: 98.1 (04-03 @ 06:28)  HR: 87 (04-03 @ 11:26) (86 - 87)  BP: 162/91 (04-03 @ 11:26) (124/72 - 162/91)  RR: 18 (04-03 @ 11:26) (18 - 18)  SpO2: 96% (04-03 @ 11:26) (96% - 98%)    I/O:      03-31 @ 07:01  -  04-01 @ 07:00  --------------------------------------------------------  IN: 180 mL / OUT: 3050 mL / NET: -2870 mL    04-01 @ 07:01  -  04-02 @ 07:00  --------------------------------------------------------  IN: 60 mL / OUT: 1700 mL / NET: -1640 mL    04-02 @ 07:01  -  04-03 @ 07:00  --------------------------------------------------------  IN: 60 mL / OUT: 3750 mL / NET: -3690 mL    04-03 @ 07:01  -  04-03 @ 13:54  --------------------------------------------------------  IN: 0 mL / OUT: 1130 mL / NET: -1130 mL    ======================    LABS:                          13.9   9.13  )-----------( 306 ( 03 Apr 2025 06:46 )             41.9     04-03    126[L]  |  87[L]  |  73[H]  ----------------------------<  222[H]  4.2   |  25  |  2.25[H]    Ca    9.3      03 Apr 2025 06:46  Phos  4.1     04-02  Mg     2.8     04-03          Urinalysis Basic - ( 03 Apr 2025 06:46 )    Color: x / Appearance: x / SG: x / pH: x  Gluc: 222 mg/dL / Ketone: x  / Bili: x / Urobili: x   Blood: x / Protein: x / Nitrite: x   Leuk Esterase: x / RBC: x / WBC x   Sq Epi: x / Non Sq Epi: x / Bacteria: x

## 2025-04-03 NOTE — PROGRESS NOTE ADULT - PROBLEM SELECTOR PLAN 1
-Etiology: likely ischemic  -GDMT: c/w spironolactone 25 mg QD and Increase Toprol XL to 100 mg QD. C/w ISDN 20mg TID and start Hydralazine 10 mg TID for afterload reduction.   -Eventual ARB/ARNi if renal function permits  -Will hold off on SGLT2-i for now given PAD with ulcers and fluctuating creatinine   -Diuretics: Hold Bumex. Will adjust diuretics after RHC tomorrow  -Pt is scheduled for RHC and CardioMEMS tomorrow. NPO after midnight  -Strict I/Os and daily weights  -Keep K>4 and Mg>2.

## 2025-04-03 NOTE — PROGRESS NOTE ADULT - SUBJECTIVE AND OBJECTIVE BOX
NEPHROLOGY     Patient seen and examined sitting on bed, comfortable on room air, reports feeling ok, denies pain, no sob, in no acute distress.     MEDICATIONS  (STANDING):  AQUAPHOR (petrolatum Ointment) 1 Application(s) Topical two times a day  aspirin  chewable 81 milliGRAM(s) Oral daily  dextrose 5%. 1000 milliLiter(s) (50 mL/Hr) IV Continuous <Continuous>  dextrose 5%. 1000 milliLiter(s) (100 mL/Hr) IV Continuous <Continuous>  dextrose 50% Injectable 25 Gram(s) IV Push once  dextrose 50% Injectable 12.5 Gram(s) IV Push once  dextrose 50% Injectable 25 Gram(s) IV Push once  glucagon  Injectable 1 milliGRAM(s) IntraMuscular once  insulin glargine Injectable (LANTUS) 22 Unit(s) SubCutaneous at bedtime  insulin lispro (ADMELOG) corrective regimen sliding scale   SubCutaneous three times a day before meals  insulin lispro (ADMELOG) corrective regimen sliding scale   SubCutaneous at bedtime  insulin lispro Injectable (ADMELOG) 10 Unit(s) SubCutaneous three times a day before meals  isosorbide   dinitrate Tablet (ISORDIL) 20 milliGRAM(s) Oral three times a day  metoprolol succinate ER 75 milliGRAM(s) Oral daily  Nephro-daniel 1 Tablet(s) Oral daily  spironolactone 50 milliGRAM(s) Oral daily    VITALS:  T(C): , Max: 36.7 (04-03-25 @ 06:28)  T(F): , Max: 98.1 (04-03-25 @ 06:28)  HR: 87 (04-03-25 @ 11:26)  BP: 162/91 (04-03-25 @ 11:26)  BP(mean): 114 (04-03-25 @ 11:26)  RR: 18 (04-03-25 @ 11:26)  SpO2: 96% (04-03-25 @ 11:26)    I and O's:    04-02 @ 07:01  -  04-03 @ 07:00  --------------------------------------------------------  IN: 60 mL / OUT: 3750 mL / NET: -3690 mL    PHYSICAL EXAM:  Constitutional: NAD, Alert  HEENT: NCAT, MMM  Neck: Supple, No JVD  Respiratory: CTA-b/l  Cardiovascular: RRR s1s2, no m/r/g  Gastrointestinal: BS+, soft, NT/ND  Extremities: 1+ b/l LE edema  Neurological: no focal deficits; strength grossly intact  Back: no CVAT b/l  Skin: No rashes, no nevi    LABS:                        13.9   9.13  )-----------( 306      ( 03 Apr 2025 06:46 )             41.9     04-03    126[L]  |  87[L]  |  73[H]  ----------------------------<  222[H]  4.2   |  25  |  2.25[H]    Ca    9.3      03 Apr 2025 06:46  Phos  4.1     04-02  Mg     2.8     04-03

## 2025-04-03 NOTE — PROGRESS NOTE ADULT - SUBJECTIVE AND OBJECTIVE BOX
No dyspnea   Now on Bumex 1 mg/hr  /83  HR 87  Lungs clear  Irregular rhythm  1-2+ edema (unchanged)    BUN 73  Crt 2.25  Na 126  CBC  normal    Imp:  Patient claims that he is now at his euvolemic weight.  Given the bump in Crt and the drop in Na would switch to Torsemide 2 mg bid  if OK with Renal  Hold off on Entresto until the Crt stabilizes  Patient tells me that he is tentatively scheduled for a Cardio Mems on 4/4/25

## 2025-04-03 NOTE — PROGRESS NOTE ADULT - SUBJECTIVE AND OBJECTIVE BOX
Date of service: 25 @ 15:44      Patient is a 70y old  Male who presents with a chief complaint of CHF (2025 13:54)                                                               INTERVAL HPI/OVERNIGHT EVENTS:    REVIEW OF SYSTEMS:     CONSTITUTIONAL: No weakness, fevers or chills    RESPIRATORY: No cough, wheezing,  No shortness of breath  CARDIOVASCULAR: No chest pain or palpitations  GASTROINTESTINAL: No abdominal pain  . No nausea, vomiting, or hematemesis; No diarrhea or constipation. No melena or hematochezia.  GENITOURINARY: No dysuria, frequency or hematuria  NEUROLOGICAL: No numbness or weakness  SKIN: No itching, burning, rashes, or lesions                                                                                                                                                                                                                                                                                 Medications:  MEDICATIONS  (STANDING):  AQUAPHOR (petrolatum Ointment) 1 Application(s) Topical two times a day  aspirin  chewable 81 milliGRAM(s) Oral daily  dextrose 5%. 1000 milliLiter(s) (50 mL/Hr) IV Continuous <Continuous>  dextrose 5%. 1000 milliLiter(s) (100 mL/Hr) IV Continuous <Continuous>  dextrose 50% Injectable 25 Gram(s) IV Push once  dextrose 50% Injectable 12.5 Gram(s) IV Push once  dextrose 50% Injectable 25 Gram(s) IV Push once  glucagon  Injectable 1 milliGRAM(s) IntraMuscular once  insulin glargine Injectable (LANTUS) 22 Unit(s) SubCutaneous at bedtime  insulin lispro (ADMELOG) corrective regimen sliding scale   SubCutaneous three times a day before meals  insulin lispro (ADMELOG) corrective regimen sliding scale   SubCutaneous at bedtime  insulin lispro Injectable (ADMELOG) 12 Unit(s) SubCutaneous three times a day before meals  isosorbide   dinitrate Tablet (ISORDIL) 20 milliGRAM(s) Oral three times a day  metoprolol succinate ER 75 milliGRAM(s) Oral daily  Nephro-daniel 1 Tablet(s) Oral daily  ranolazine 500 milliGRAM(s) Oral two times a day    MEDICATIONS  (PRN):  acetaminophen     Tablet .. 650 milliGRAM(s) Oral every 6 hours PRN Temp greater or equal to 38C (100.4F), Mild Pain (1 - 3)  aluminum hydroxide/magnesium hydroxide/simethicone Suspension 30 milliLiter(s) Oral every 4 hours PRN Dyspepsia  dextrose Oral Gel 15 Gram(s) Oral once PRN Blood Glucose LESS THAN 70 milliGRAM(s)/deciliter  melatonin 3 milliGRAM(s) Oral at bedtime PRN Insomnia  ondansetron Injectable 4 milliGRAM(s) IV Push every 8 hours PRN Nausea and/or Vomiting       Allergies    atorvastatin (Muscle Pain (Severe))    Intolerances      Vital Signs Last 24 Hrs  T(C): 36.7 (2025 11:26), Max: 36.7 (2025 06:28)  T(F): 98.1 (2025 11:), Max: 98.1 (2025 06:28)  HR: 87 (:) (86 - 87)  BP: 162/91 (2025 11:) (124/72 - 162/91)  BP(mean): 114 (:) (114 - 114)  RR: 18 (2025 11:26) (18 - 18)  SpO2: 96% (:) (96% - 98%)    Parameters below as of 2025 11:26  Patient On (Oxygen Delivery Method): room air      CAPILLARY BLOOD GLUCOSE      POCT Blood Glucose.: 346 mg/dL (2025 12:43)  POCT Blood Glucose.: 186 mg/dL (2025 08:59)  POCT Blood Glucose.: 132 mg/dL (2025 21:46)  POCT Blood Glucose.: 118 mg/dL (2025 17:45)       @ 07: @ 07:00  --------------------------------------------------------  IN: 60 mL / OUT: 3750 mL / NET: -3690 mL     @ : @ 15:44  --------------------------------------------------------  IN: 0 mL / OUT: 1410 mL / NET: -1410 mL      Physical Exam:    Daily     Daily Weight in k.3 (2025 04:22)  General:  Well appearing, NAD, not cachetic  HEENT:  Nonicteric, PERRLA  CV:  RRR, S1S2   Lungs:  CTA B/L, no wheezes, rales, rhonchi  Abdomen:  Soft, non-tender, no distended, positive BS  Extremities: edema     Neuro:  AAOx3, non-focal, grossly intact                                                                                                                                                                                                                                                                                                LABS:                               13.9   9.13  )-----------( 306      ( 2025 06:46 )             41.9                      04-03    131[L]  |  84[L]  |  72[H]  ----------------------------<  324[H]  3.8   |  30  |  2.24[H]    Ca    9.5      2025 13:26  Phos  4.1     04-02  Mg     2.8     04-03                         RADIOLOGY & ADDITIONAL TESTS         I personally reviewed: [  ]EKG   [  ]CXR    [  ] CT      A/P:         Discussed with :     Derek consultants' Notes   Time spent :

## 2025-04-03 NOTE — PROGRESS NOTE ADULT - PROBLEM SELECTOR PLAN 1
Lantus 22 units at bed time.  Increase Admelog 12 units before each meal in addition to Admelog correction scale coverage.  Patient counseled for compliance with consistent low carb diet and physical activity as tolerated.

## 2025-04-04 NOTE — PROGRESS NOTE ADULT - ASSESSMENT
Assessment  DMT2: 70y Male with DM T2 with hyperglycemia admitted with SOB,  patient was on oral hypoglycemic agents at home, now on  insulin coverage, blood sugars are fluctuating due to inconsistent food intake.  Patient is high risk with high level decision making due to uncontrolled diabetes, has increased risk for complications.   CAD: on medications, monitored, no acute events.  HTN: On antihypertensive medications, monitored, stable.    Myrna Rossi MD  Cell: [phone]  Office: [phone]

## 2025-04-04 NOTE — PROGRESS NOTE ADULT - SUBJECTIVE AND OBJECTIVE BOX
To have a RHC and Cardio Mems placement  Had chest tightness yesterday and was started on Ranolazine  Now off Bumex drip  /78  HR 71  Lungs clear  Irregular rhythm   1+ edema    BUN 77  Crt 2.38  Na 132    Imp:  Inoperable severe CAD with CHF and EF 40-45%  He has worsening renal function  Plan:  To have RHC and Cardio Mems placement today to assist in diuretic management  Continue present Ranolazine  Metoprolol and Isordil dosing

## 2025-04-04 NOTE — PROGRESS NOTE ADULT - ASSESSMENT
·  Problem: Acute decompensated heart failure. :  questionable compliance   bumex was held : fu reads from RHC and cardiomems   monitor Cr    check Va duplex:   Dileep   fu with HF team     ·  Problem: Wound of left foot: Left dorsal foot ulceration: Stable, noninfected, present on admission  local wound care per pod       ·  Problem: Type 2 diabetes mellitus.: uncontrolled  A1c  worse than in the past hence questionable complaince   endo consulted     ·  Problem: Afib.  ·  Plan: -c/w xarelto and toprol.: ???  need to stop xarelto for cardioMems placement :  restart today if cleared by Cards     ·  Problem: CAD (coronary artery disease).  ·  Plan: -c/w asa 81mg       Stage 4 chronic kidney disease.   ·  Plan: -stable. eval by nephro.   -dose meds per renal fx.

## 2025-04-04 NOTE — PROGRESS NOTE ADULT - SUBJECTIVE AND OBJECTIVE BOX
Chief complaint    Patient is a 70y old  Male who presents with a chief complaint of CHF (03 Apr 2025 13:54)   Review of systems  Patient appears comfortable.    Labs and Fingersticks  CAPILLARY BLOOD GLUCOSE      POCT Blood Glucose.: 201 mg/dL (03 Apr 2025 21:54)  POCT Blood Glucose.: 233 mg/dL (03 Apr 2025 17:23)  POCT Blood Glucose.: 346 mg/dL (03 Apr 2025 12:43)  POCT Blood Glucose.: 186 mg/dL (03 Apr 2025 08:59)      Anion Gap: 16 (04-04 @ 07:03)  Anion Gap: 17 (04-03 @ 13:26)  Anion Gap: 14 (04-03 @ 06:46)      Calcium: 9.4 (04-04 @ 07:03)  Calcium: 9.5 (04-03 @ 13:26)  Calcium: 9.3 (04-03 @ 06:46)          04-04    132[L]  |  90[L]  |  77[H]  ----------------------------<  192[H]  4.4   |  26  |  2.38[H]    Ca    9.4      04 Apr 2025 07:03  Mg     2.8     04-03                          13.9   9.13  )-----------( 306      ( 03 Apr 2025 06:46 )             41.9     Medications  MEDICATIONS  (STANDING):  AQUAPHOR (petrolatum Ointment) 1 Application(s) Topical two times a day  aspirin  chewable 81 milliGRAM(s) Oral daily  dextrose 5%. 1000 milliLiter(s) (100 mL/Hr) IV Continuous <Continuous>  dextrose 5%. 1000 milliLiter(s) (50 mL/Hr) IV Continuous <Continuous>  dextrose 50% Injectable 25 Gram(s) IV Push once  dextrose 50% Injectable 12.5 Gram(s) IV Push once  dextrose 50% Injectable 25 Gram(s) IV Push once  glucagon  Injectable 1 milliGRAM(s) IntraMuscular once  insulin glargine Injectable (LANTUS) 12 Unit(s) SubCutaneous at bedtime  insulin lispro (ADMELOG) corrective regimen sliding scale   SubCutaneous three times a day before meals  insulin lispro (ADMELOG) corrective regimen sliding scale   SubCutaneous at bedtime  insulin lispro Injectable (ADMELOG) 12 Unit(s) SubCutaneous three times a day before meals  isosorbide   dinitrate Tablet (ISORDIL) 20 milliGRAM(s) Oral three times a day  metoprolol succinate ER 75 milliGRAM(s) Oral daily  Nephro-daniel 1 Tablet(s) Oral daily  ranolazine 500 milliGRAM(s) Oral two times a day  spironolactone 25 milliGRAM(s) Oral daily      Physical Exam  General: Patient appears comfortable.  Vital Signs Last 12 Hrs  T(F): 98.1 (04-04-25 @ 04:20), Max: 98.4 (04-03-25 @ 20:32)  HR: 90 (04-04-25 @ 04:20) (85 - 90)  BP: 141/71 (04-04-25 @ 04:20) (139/74 - 148/82)  BP(mean): --  RR: 18 (04-04-25 @ 04:20) (18 - 18)  SpO2: 99% (04-04-25 @ 04:20) (99% - 99%)  Neck: No palpable thyroid nodules.  CVS: S1S2, No murmurs  Respiratory: No wheezing, no crepitations  GI: Abdomen soft, non tender.    Diagnostics        Radiology:

## 2025-04-04 NOTE — CHART NOTE - NSCHARTNOTEFT_GEN_A_CORE
S/P RHC and MEMS placement   - RFV access site   suture removed at 1130 - patient tolerated well no bleeding/ no hematoma   resume Xarelto this evening if site stable

## 2025-04-04 NOTE — PROGRESS NOTE ADULT - ASSESSMENT
IMPRESSION: 70M w/ HTN, DM2, CAD, HFrEF, and CKD4, 3/26/25 p/w ADHF    (1)CKD - 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 - renal fxn slightly higher   (2)Hyponatremia   (3)Hypermagnesemia  (4)CV - acute on chronic HFrEF - now off Bumex gtt @1, Cr slightly higher. CHF consulted for possible mems placement (planned friday)    RECOMMEND:  (1)continue Aldactone 25 mg po qd   (2)good candidate for entresto/ace/arb when RF is recovered for further BP control  (3)Continue strict I/Os  (4)Dose new meds for GFR 30-35ml/min  (5)BMP+Mg at least daily    Delilah Foy DNP  [employer]  [phone]

## 2025-04-04 NOTE — CHART NOTE - NSCHARTNOTEFT_GEN_A_CORE
S/p RHC and Cardio MEMS today.  RHC hemodynamic parameters reviewed    RA 28  RV 85/6  PA 78/32 (47)  PCWP 30    CO/CI: 2.72/1.67    Recommendations:  Resume Bumex drip@ 1mg/hr  Increase Hydralazine to 25 mg TID and Isosorbide dinitrate to 30 mg TID  c/w Toprol 75 mg qd and Spironolactone 25 mg qd  monitor lytes and renal function  will recheck CardioMEMS reading on Monday

## 2025-04-04 NOTE — CHART NOTE - NSCHARTNOTEFT_GEN_A_CORE
Pt 70YM, admitted for CHF exacerbation. Pt now s/p cardiomems and RHC via RVF today 4/4. Upon return to floor, pt assessed at bedside. Cath site appears stable, no bleeding, tenderness to palpation or e/o hematoma at site. Pts wife present in room at time of assessment, updated on plan of care and all questions answered at time. Pt to resume xarelto tonight.         Aarti Yu, NP

## 2025-04-04 NOTE — PROGRESS NOTE ADULT - SUBJECTIVE AND OBJECTIVE BOX
NEPHROLOGY     Patient seen and examined resting comfortably on room air, reports feeling tired, s/p  RHC and MEMS placement, denies pain, no sob, in no acute distress.     MEDICATIONS  (STANDING):  AQUAPHOR (petrolatum Ointment) 1 Application(s) Topical two times a day  aspirin  chewable 81 milliGRAM(s) Oral daily  dextrose 5%. 1000 milliLiter(s) (100 mL/Hr) IV Continuous <Continuous>  dextrose 5%. 1000 milliLiter(s) (50 mL/Hr) IV Continuous <Continuous>  dextrose 50% Injectable 25 Gram(s) IV Push once  dextrose 50% Injectable 12.5 Gram(s) IV Push once  dextrose 50% Injectable 25 Gram(s) IV Push once  glucagon  Injectable 1 milliGRAM(s) IntraMuscular once  insulin glargine Injectable (LANTUS) 12 Unit(s) SubCutaneous at bedtime  insulin lispro (ADMELOG) corrective regimen sliding scale   SubCutaneous three times a day before meals  insulin lispro (ADMELOG) corrective regimen sliding scale   SubCutaneous at bedtime  insulin lispro Injectable (ADMELOG) 12 Unit(s) SubCutaneous three times a day before meals  isosorbide   dinitrate Tablet (ISORDIL) 20 milliGRAM(s) Oral three times a day  metoprolol succinate ER 75 milliGRAM(s) Oral daily  Nephro-daniel 1 Tablet(s) Oral daily  ranolazine 500 milliGRAM(s) Oral two times a day  rivaroxaban 15 milliGRAM(s) Oral with dinner  spironolactone 25 milliGRAM(s) Oral daily    VITALS:  T(C): , Max: 36.9 (04-03-25 @ 20:32)  T(F): , Max: 98.4 (04-03-25 @ 20:32)  HR: 73 (04-04-25 @ 13:02)  BP: 142/84 (04-04-25 @ 13:02)  BP(mean): --  RR: 18 (04-04-25 @ 13:02)  SpO2: 97% (04-04-25 @ 13:02)  Wt(kg): --  I and O's:    04-03 @ 07:01  -  04-04 @ 07:00  --------------------------------------------------------  IN: 0 mL / OUT: 2510 mL / NET: -2510 mL      Height (cm): 167.6 (04-04 @ 09:07)  Weight (kg): 55.792 (04-04 @ 09:07)  BMI (kg/m2): 19.9 (04-04 @ 09:07)  BSA (m2): 1.63 (04-04 @ 09:07)    PHYSICAL EXAM:  Constitutional: NAD, Alert  HEENT: NCAT, MMM  Neck: Supple, No JVD  Respiratory: CTA-b/l  Cardiovascular: RRR s1s2, no m/r/g  Gastrointestinal: BS+, soft, NT/ND  Extremities: 1+ b/l LE edema  Neurological: no focal deficits; strength grossly intact  Back: no CVAT b/l  Skin: No rashes, no nevi      LABS:                        13.9   9.13  )-----------( 306      ( 03 Apr 2025 06:46 )             41.9     04-04    132[L]  |  90[L]  |  77[H]  ----------------------------<  192[H]  4.4   |  26  |  2.38[H]    Ca    9.4      04 Apr 2025 07:03  Mg     2.8     04-03        Urine Studies:  Urinalysis Basic - ( 04 Apr 2025 07:03 )    Color: x / Appearance: x / SG: x / pH: x  Gluc: 192 mg/dL / Ketone: x  / Bili: x / Urobili: x   Blood: x / Protein: x / Nitrite: x   Leuk Esterase: x / RBC: x / WBC x   Sq Epi: x / Non Sq Epi: x / Bacteria: x    Creatinine, Random Urine: 20 mg/dL (04-03 @ 16:20)  Sodium, Random Urine: 40 mmol/L (04-03 @ 16:20)  Osmolality, Random Urine: 262 mos/kg (04-03 @ 16:20)  Potassium, Random Urine: 16 mmol/L (04-03 @ 16:20)  Sodium, Random Urine: 36 mmol/L (04-02 @ 15:18)

## 2025-04-05 NOTE — PROGRESS NOTE ADULT - SUBJECTIVE AND OBJECTIVE BOX
Chief complaint    Patient is a 70y old  Male who presents with a chief complaint of CHF (05 Apr 2025 10:17)   Review of systems  Patient appears comfortable.    Labs and Fingersticks  CAPILLARY BLOOD GLUCOSE      POCT Blood Glucose.: 361 mg/dL (05 Apr 2025 12:43)  POCT Blood Glucose.: 227 mg/dL (05 Apr 2025 08:51)  POCT Blood Glucose.: 230 mg/dL (05 Apr 2025 05:30)  POCT Blood Glucose.: 429 mg/dL (04 Apr 2025 23:59)  POCT Blood Glucose.: 462 mg/dL (04 Apr 2025 23:57)  POCT Blood Glucose.: 345 mg/dL (04 Apr 2025 21:06)  POCT Blood Glucose.: 101 mg/dL (04 Apr 2025 17:26)      Anion Gap: 14 (04-05 @ 06:46)  Anion Gap: 13 (04-04 @ 23:15)  Anion Gap: 16 (04-04 @ 07:03)      Calcium: 9.5 (04-05 @ 06:46)  Calcium: 9.6 (04-04 @ 23:15)  Calcium: 9.4 (04-04 @ 07:03)          04-05    130[L]  |  87[L]  |  73[H]  ----------------------------<  232[H]  5.2   |  29  |  2.61[H]    Ca    9.5      05 Apr 2025 06:46      Medications  MEDICATIONS  (STANDING):  AQUAPHOR (petrolatum Ointment) 1 Application(s) Topical two times a day  aspirin  chewable 81 milliGRAM(s) Oral daily  buMETAnide Infusion 1 mG/Hr (5 mL/Hr) IV Continuous <Continuous>  dextrose 5%. 1000 milliLiter(s) (100 mL/Hr) IV Continuous <Continuous>  dextrose 5%. 1000 milliLiter(s) (50 mL/Hr) IV Continuous <Continuous>  dextrose 50% Injectable 25 Gram(s) IV Push once  dextrose 50% Injectable 12.5 Gram(s) IV Push once  dextrose 50% Injectable 25 Gram(s) IV Push once  glucagon  Injectable 1 milliGRAM(s) IntraMuscular once  hydrALAZINE 25 milliGRAM(s) Oral three times a day  insulin glargine Injectable (LANTUS) 18 Unit(s) SubCutaneous at bedtime  insulin lispro (ADMELOG) corrective regimen sliding scale   SubCutaneous three times a day before meals  insulin lispro (ADMELOG) corrective regimen sliding scale   SubCutaneous at bedtime  insulin lispro Injectable (ADMELOG) 13 Unit(s) SubCutaneous three times a day before meals  isosorbide   dinitrate Tablet (ISORDIL) 30 milliGRAM(s) Oral three times a day  metoprolol succinate ER 75 milliGRAM(s) Oral daily  Nephro-daniel 1 Tablet(s) Oral daily  ranolazine 500 milliGRAM(s) Oral two times a day  rivaroxaban 15 milliGRAM(s) Oral with dinner  spironolactone 25 milliGRAM(s) Oral daily      Physical Exam  General: Patient appears comfortable.  Vital Signs Last 12 Hrs  T(F): 97.6 (04-05-25 @ 12:02), Max: 97.6 (04-05-25 @ 12:02)  HR: 78 (04-05-25 @ 12:35) (78 - 96)  BP: 138/70 (04-05-25 @ 12:35) (102/59 - 162/78)  BP(mean): 73 (04-05-25 @ 12:02) (73 - 73)  RR: 18 (04-05-25 @ 12:35) (18 - 18)  SpO2: 97% (04-05-25 @ 12:35) (96% - 97%)  Neck: No palpable thyroid nodules.  CVS: S1S2, No murmurs  Respiratory: No wheezing, no crepitations  GI: Abdomen soft, non tender.    Diagnostics        Radiology:

## 2025-04-05 NOTE — PROGRESS NOTE ADULT - ASSESSMENT
IMPRESSION: 70M w/ HTN, DM2, CAD, HFrEF, and CKD4, 3/26/25 p/w ADHF    (1)CKD - 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 - renal fxn slightly higher   (2)Hyponatremia   (3)Hypermagnesemia as of late   (4)Hyperkalemia - improving   (5)CV - acute on chronic HFrEF - s/p RHC and MEMS placement yesterday, now back on Bumex gtt @1, Cr slightly higher    RECOMMEND:  (1)a/w bumex gtt at 1mg/hr  (2)continue Aldactone 25 mg po qd   (3)good candidate for entresto/ace/arb when RF is recovered for further BP control  (4)Continue strict I/Os  (5)Dose new meds for GFR 25-30ml/min  (6)BMP+Mg at least daily    D/w Dr. Christen Foy, [facility]  [employer]  [phone]

## 2025-04-05 NOTE — PROGRESS NOTE ADULT - SUBJECTIVE AND OBJECTIVE BOX
Date of service: 25 @ 23:09      Patient is a 70y old  Male who presents with a chief complaint of CHF (2025 10:17)                                                               INTERVAL HPI/OVERNIGHT EVENTS:    REVIEW OF SYSTEMS:  No cp   no sob                                                                                                                                                                                                                                                                    Medications:  MEDICATIONS  (STANDING):  AQUAPHOR (petrolatum Ointment) 1 Application(s) Topical two times a day  aspirin  chewable 81 milliGRAM(s) Oral daily  buMETAnide Infusion 1 mG/Hr (5 mL/Hr) IV Continuous <Continuous>  dextrose 5%. 1000 milliLiter(s) (50 mL/Hr) IV Continuous <Continuous>  dextrose 5%. 1000 milliLiter(s) (100 mL/Hr) IV Continuous <Continuous>  dextrose 50% Injectable 25 Gram(s) IV Push once  dextrose 50% Injectable 12.5 Gram(s) IV Push once  dextrose 50% Injectable 25 Gram(s) IV Push once  glucagon  Injectable 1 milliGRAM(s) IntraMuscular once  hydrALAZINE 25 milliGRAM(s) Oral three times a day  insulin glargine Injectable (LANTUS) 18 Unit(s) SubCutaneous at bedtime  insulin lispro (ADMELOG) corrective regimen sliding scale   SubCutaneous three times a day before meals  insulin lispro (ADMELOG) corrective regimen sliding scale   SubCutaneous at bedtime  insulin lispro Injectable (ADMELOG) 13 Unit(s) SubCutaneous three times a day before meals  isosorbide   dinitrate Tablet (ISORDIL) 30 milliGRAM(s) Oral three times a day  metoprolol succinate ER 75 milliGRAM(s) Oral daily  Nephro-daniel 1 Tablet(s) Oral daily  ranolazine 500 milliGRAM(s) Oral two times a day  rivaroxaban 15 milliGRAM(s) Oral with dinner  spironolactone 25 milliGRAM(s) Oral daily    MEDICATIONS  (PRN):  acetaminophen     Tablet .. 650 milliGRAM(s) Oral every 6 hours PRN Temp greater or equal to 38C (100.4F), Mild Pain (1 - 3)  aluminum hydroxide/magnesium hydroxide/simethicone Suspension 30 milliLiter(s) Oral every 4 hours PRN Dyspepsia  dextrose Oral Gel 15 Gram(s) Oral once PRN Blood Glucose LESS THAN 70 milliGRAM(s)/deciliter  melatonin 3 milliGRAM(s) Oral at bedtime PRN Insomnia  ondansetron Injectable 4 milliGRAM(s) IV Push every 8 hours PRN Nausea and/or Vomiting       Allergies    atorvastatin (Muscle Pain (Severe))    Intolerances      Vital Signs Last 24 Hrs  T(C): 36.6 (2025 20:30), Max: 36.6 (2025 20:30)  T(F): 97.8 (2025 20:30), Max: 97.8 (2025 20:30)  HR: 75 (2025 20:30) (75 - 96)  BP: 137/68 (2025 20:30) (102/59 - 162/78)  BP(mean): 73 (2025 12:02) (73 - 73)  RR: 18 (2025 20:30) (18 - 18)  SpO2: 96% (2025 20:30) (96% - 97%)    Parameters below as of 2025 20:30  Patient On (Oxygen Delivery Method): room air      CAPILLARY BLOOD GLUCOSE      POCT Blood Glucose.: 196 mg/dL (2025 21:26)  POCT Blood Glucose.: 73 mg/dL (2025 17:18)  POCT Blood Glucose.: 361 mg/dL (2025 12:43)  POCT Blood Glucose.: 227 mg/dL (2025 08:51)  POCT Blood Glucose.: 230 mg/dL (2025 05:30)  POCT Blood Glucose.: 429 mg/dL (2025 23:59)  POCT Blood Glucose.: 462 mg/dL (2025 23:57)       @ 07:  -   @ 07:00  --------------------------------------------------------  IN: 500 mL / OUT: 1800 mL / NET: -1300 mL     @ 07:01  -   @ 23:09  --------------------------------------------------------  IN: 180 mL / OUT: 1100 mL / NET: -920 mL      Physical Exam:    Daily     Daily Weight in k.4 (2025 06:45)  General:  Well appearing, NAD, not cachetic  HEENT:  Nonicteric, PERRLA  CV:  RRR, S1S2   Lungs:  CTA B/L, no wheezes, rales, rhonchi  Abdomen:  Soft, non-tender, no distended, positive BS  Extremities: no edema                                                                                                                                                                                                                                                                                               LABS:                                132[L]  |  89[L]  |  74[H]  ----------------------------<  61[L]  4.6   |  28  |  2.76[H]    Ca    9.8      2025 17:46                         RADIOLOGY & ADDITIONAL TESTS         I personally reviewed: [  ]EKG   [  ]CXR    [  ] CT      A/P:         Discussed with :     Derek consultants' Notes   Time spent :

## 2025-04-05 NOTE — PROGRESS NOTE ADULT - ASSESSMENT
·  Problem: Acute decompensated heart failure. :  questionable compliance   Bumex   Cardiomems   monitor Cr    check Va duplex:   Negative   fu with HF team     ·  Problem: Wound of left foot: Left dorsal foot ulceration: Stable, noninfected, present on admission  local wound care per pod       ·  Problem: Type 2 diabetes mellitus.: uncontrolled  A1c  worse than in the past hence questionable complaince   endo consulted     ·  Problem: Afib.  ·  Plan: -c/w xarelto and toprol.: ???  need to stop xarelto for cardioMems placement :  restart today if cleared by Cards     ·  Problem: CAD (coronary artery disease).  ·  Plan: -c/w asa 81mg       Stage 4 chronic kidney disease.   ·  Plan: -stable. eval by nephro.   -dose meds per renal fx.

## 2025-04-05 NOTE — PROGRESS NOTE ADULT - SUBJECTIVE AND OBJECTIVE BOX
S/P RHC and Cardio Mems placement   No dyspnea on RA  /76  HR 81  O2 Sat 96% on RA  Lungs clear  Irregular rhythm  1-2+ edema    BUN 73  Crt 2.61    RHC - RA 28           RV 85/6           PA 78/32           PCWP 30           CO / CI   2.72 / 1.67    Imp:  Severe LV dysfunction with severe pulmonary HTN and significant fluid overload and worsened renal function  Back on IV Bumex @ 1 mg/hr  Increase Hydralazine to 25 mg tid and Isordil 30 mg tid  Check Cardio Mems on Monday

## 2025-04-05 NOTE — PROGRESS NOTE ADULT - PROBLEM SELECTOR PLAN 1
Will increase Lantus to 18 units at bed time.  Will increase Admelog to 13 units before each meal in addition to Admelog correction scale coverage.  Patient counseled for compliance with consistent low carb diet and physical activity as tolerated.  .

## 2025-04-05 NOTE — PROGRESS NOTE ADULT - SUBJECTIVE AND OBJECTIVE BOX
NEPHROLOGY     Patient seen and examined sitting on bed, comfortable on room air, denies pain, no sob, in no acute distress.     MEDICATIONS  (STANDING):  AQUAPHOR (petrolatum Ointment) 1 Application(s) Topical two times a day  aspirin  chewable 81 milliGRAM(s) Oral daily  buMETAnide Infusion 1 mG/Hr (5 mL/Hr) IV Continuous <Continuous>  dextrose 5%. 1000 milliLiter(s) (100 mL/Hr) IV Continuous <Continuous>  dextrose 5%. 1000 milliLiter(s) (50 mL/Hr) IV Continuous <Continuous>  dextrose 50% Injectable 25 Gram(s) IV Push once  dextrose 50% Injectable 12.5 Gram(s) IV Push once  dextrose 50% Injectable 25 Gram(s) IV Push once  glucagon  Injectable 1 milliGRAM(s) IntraMuscular once  hydrALAZINE 25 milliGRAM(s) Oral three times a day  insulin glargine Injectable (LANTUS) 12 Unit(s) SubCutaneous at bedtime  insulin lispro (ADMELOG) corrective regimen sliding scale   SubCutaneous three times a day before meals  insulin lispro (ADMELOG) corrective regimen sliding scale   SubCutaneous at bedtime  insulin lispro Injectable (ADMELOG) 13 Unit(s) SubCutaneous three times a day before meals  isosorbide   dinitrate Tablet (ISORDIL) 30 milliGRAM(s) Oral three times a day  metoprolol succinate ER 75 milliGRAM(s) Oral daily  Nephro-daniel 1 Tablet(s) Oral daily  ranolazine 500 milliGRAM(s) Oral two times a day  rivaroxaban 15 milliGRAM(s) Oral with dinner  spironolactone 25 milliGRAM(s) Oral daily    VITALS:  T(C): , Max: 36.4 (04-04-25 @ 20:42)  T(F): , Max: 97.6 (04-05-25 @ 12:02)  HR: 78 (04-05-25 @ 12:35)  BP: 138/70 (04-05-25 @ 12:35)  BP(mean): 73 (04-05-25 @ 12:02)  RR: 18 (04-05-25 @ 12:35)  SpO2: 97% (04-05-25 @ 12:35)    I and O's:    04-04 @ 07:01 - 04-05 @ 07:00  --------------------------------------------------------  IN: 500 mL / OUT: 1800 mL / NET: -1300 mL      PHYSICAL EXAM:  Constitutional: NAD, Alert  HEENT: NCAT, MMM  Neck: Supple, No JVD  Respiratory: CTA-b/l  Cardiovascular: RRR s1s2, no m/r/g  Gastrointestinal: BS+, soft, NT/ND  Extremities: 1+ b/l LE edema  Neurological: no focal deficits; strength grossly intact  Back: no CVAT b/l  Skin: No rashes, no nevi    LABS:    04-05    130[L]  |  87[L]  |  73[H]  ----------------------------<  232[H]  5.2   |  29  |  2.61[H]    Ca    9.5      05 Apr 2025 06:46      Urine Studies:    Creatinine, Random Urine: 20 mg/dL (04-03 @ 16:20)  Sodium, Random Urine: 40 mmol/L (04-03 @ 16:20)  Osmolality, Random Urine: 262 mos/kg (04-03 @ 16:20)  Potassium, Random Urine: 16 mmol/L (04-03 @ 16:20)

## 2025-04-05 NOTE — PROGRESS NOTE ADULT - SUBJECTIVE AND OBJECTIVE BOX
Interventional Cardiology Post Cath Progress Note:                Subjective:   Patient feels well- no current complaints- Denies chest pain, shortness of breath. Denies pain, numbness, tingling or swelling around (groin/wrist) access site    Tele 24hrs:      MEDICATIONS  (STANDING):  AQUAPHOR (petrolatum Ointment) 1 Application(s) Topical two times a day  aspirin  chewable 81 milliGRAM(s) Oral daily  buMETAnide Infusion 1 mG/Hr (5 mL/Hr) IV Continuous <Continuous>  dextrose 5%. 1000 milliLiter(s) (50 mL/Hr) IV Continuous <Continuous>  dextrose 5%. 1000 milliLiter(s) (100 mL/Hr) IV Continuous <Continuous>  dextrose 50% Injectable 25 Gram(s) IV Push once  dextrose 50% Injectable 12.5 Gram(s) IV Push once  dextrose 50% Injectable 25 Gram(s) IV Push once  glucagon  Injectable 1 milliGRAM(s) IntraMuscular once  hydrALAZINE 25 milliGRAM(s) Oral three times a day  insulin glargine Injectable (LANTUS) 12 Unit(s) SubCutaneous at bedtime  insulin lispro (ADMELOG) corrective regimen sliding scale   SubCutaneous three times a day before meals  insulin lispro (ADMELOG) corrective regimen sliding scale   SubCutaneous at bedtime  insulin lispro Injectable (ADMELOG) 12 Unit(s) SubCutaneous three times a day before meals  isosorbide   dinitrate Tablet (ISORDIL) 30 milliGRAM(s) Oral three times a day  metoprolol succinate ER 75 milliGRAM(s) Oral daily  Nephro-daniel 1 Tablet(s) Oral daily  ranolazine 500 milliGRAM(s) Oral two times a day  rivaroxaban 15 milliGRAM(s) Oral with dinner  spironolactone 25 milliGRAM(s) Oral daily    MEDICATIONS  (PRN):  acetaminophen     Tablet .. 650 milliGRAM(s) Oral every 6 hours PRN Temp greater or equal to 38C (100.4F), Mild Pain (1 - 3)  aluminum hydroxide/magnesium hydroxide/simethicone Suspension 30 milliLiter(s) Oral every 4 hours PRN Dyspepsia  dextrose Oral Gel 15 Gram(s) Oral once PRN Blood Glucose LESS THAN 70 milliGRAM(s)/deciliter  melatonin 3 milliGRAM(s) Oral at bedtime PRN Insomnia  ondansetron Injectable 4 milliGRAM(s) IV Push every 8 hours PRN Nausea and/or Vomiting      Objective:  Vital Signs Last 24 Hrs  T(C): 36.3 (05 Apr 2025 04:44), Max: 36.7 (04 Apr 2025 13:02)  T(F): 97.4 (05 Apr 2025 04:44), Max: 98 (04 Apr 2025 13:02)  HR: 81 (05 Apr 2025 05:56) (66 - 96)  BP: 150/76 (05 Apr 2025 05:56) (110/64 - 162/78)  BP(mean): --  RR: 18 (05 Apr 2025 04:44) (15 - 18)  SpO2: 96% (05 Apr 2025 04:44) (96% - 100%)    Parameters below as of 05 Apr 2025 04:44  Patient On (Oxygen Delivery Method): room air        04-04-25 @ 07:01  -  04-05-25 @ 07:00  --------------------------------------------------------  IN: 500 mL / OUT: 1800 mL / NET: -1300 mL          04-05    130[L]  |  87[L]  |  73[H]  ----------------------------<  232[H]  5.2   |  29  |  2.61[H]    Ca    9.5      05 Apr 2025 06:46        Urinalysis Basic - ( 05 Apr 2025 06:46 )    Color: x / Appearance: x / SG: x / pH: x  Gluc: 232 mg/dL / Ketone: x  / Bili: x / Urobili: x   Blood: x / Protein: x / Nitrite: x   Leuk Esterase: x / RBC: x / WBC x   Sq Epi: x / Non Sq Epi: x / Bacteria: x      Focused Physical Exam:  No apparent distress, alert and oriented times three, appropriate affect  right groin: Soft, non tender, no bleeding or hematoma, clean/dry/intact- RLE/LLE +2 palpable femoral pulse  No clubbing, cyanosis or edema      Assessment/Plan: HPI:  69M PMHx CAD s/p PCI, HFrEF, afib, HTN, T2DM, CKD3. Was recently admitted 1/31-2/7 ADHF, also found w/ diminshed pulse b/l feet with foot wound, seen by vas, consistent w/ PAD, no acute surgical mgnt.   Pt returns today w/ SOB and BLE swelling, as well as left foot wound that's worsened since last admission.   Seen by nephro here, rec bumex gtt.   Seen by vasc here, no acute surgical intervention, pods c/s for wound care.  (26 Mar 2025 21:04)    4/4/25 s/p MEMS and RHC     - right femoral site is stable  - Continue DAPT (aspirin 81mg and clopidogrel 75mg)  - Continue statin  - EF >35% or <=35%.  If <=35% is patient on an ACE/ARB/ARNI.  If not, why?  ?creatinine  - Recommend a heart healthy diet which includes a variety of fruits and vegetables, whole grains, low fat dairy products, legumes and skinless poulty and fish; food prepared with little or no salt and minimize processed foods  - Avoid using NSAIDs  (Aleve, Motrin, ibuprofen, naproxen) while on DAPT, please utilize Tylenol for pain control (not to exceed 4gm in 24 hours)  - Patient aware to take DAPT  as prescribed and DO NOT STOP taking without consulting cardiologist first or STENT/s WILL CLOSE  - Reviewed and reinforced with patient:  site complications ( eg: bleeding, excruciating pain at the procedural site, large swelling ball size-  extremity numbness, tingling, temperature change), or CHEST PAIN; pt aware that if any of those occur he/she must call cardiologist IMMEDIATELY or 911 or go to nearest emergency room   - Reviewed and reinforced a heart healthy diet, Smoking Cessation  - Patient verbalizes understanding of ALL OF THE ABOVE, and gives positive feedback   -please make sure DAPT is prescribed to pt's preferred pharmacy on dc   -f/u appt in 2 weeks post dc with outpt cardiologist  - Keep Mg >2 K>4   - cont to monitor on tele  - all other care as per primary     Please check [email or web address] password [email or web address] for cardiology service schedule and contact information via TEAMS.                                                                                             Interventional Cardiology Post Cath Progress Note:                Subjective:   Patient feels well- no current complaints- Denies chest pain, shortness of breath. Denies pain, numbness, tingling or swelling around right groin access site      MEDICATIONS  (STANDING):  AQUAPHOR (petrolatum Ointment) 1 Application(s) Topical two times a day  aspirin  chewable 81 milliGRAM(s) Oral daily  buMETAnide Infusion 1 mG/Hr (5 mL/Hr) IV Continuous <Continuous>  glucagon  Injectable 1 milliGRAM(s) IntraMuscular once  hydrALAZINE 25 milliGRAM(s) Oral three times a day  insulin glargine Injectable (LANTUS) 12 Unit(s) SubCutaneous at bedtime  insulin lispro (ADMELOG) corrective regimen sliding scale   SubCutaneous three times a day before meals  insulin lispro (ADMELOG) corrective regimen sliding scale   SubCutaneous at bedtime  insulin lispro Injectable (ADMELOG) 12 Unit(s) SubCutaneous three times a day before meals  isosorbide   dinitrate Tablet (ISORDIL) 30 milliGRAM(s) Oral three times a day  metoprolol succinate ER 75 milliGRAM(s) Oral daily  Nephro-daniel 1 Tablet(s) Oral daily  ranolazine 500 milliGRAM(s) Oral two times a day  rivaroxaban 15 milliGRAM(s) Oral with dinner  spironolactone 25 milliGRAM(s) Oral daily    MEDICATIONS  (PRN):  acetaminophen     Tablet .. 650 milliGRAM(s) Oral every 6 hours PRN Temp greater or equal to 38C (100.4F), Mild Pain (1 - 3)  aluminum hydroxide/magnesium hydroxide/simethicone Suspension 30 milliLiter(s) Oral every 4 hours PRN Dyspepsia  dextrose Oral Gel 15 Gram(s) Oral once PRN Blood Glucose LESS THAN 70 milliGRAM(s)/deciliter  melatonin 3 milliGRAM(s) Oral at bedtime PRN Insomnia  ondansetron Injectable 4 milliGRAM(s) IV Push every 8 hours PRN Nausea and/or Vomiting      Objective:  Vital Signs Last 24 Hrs  T(C): 36.3 (05 Apr 2025 04:44), Max: 36.7 (04 Apr 2025 13:02)  T(F): 97.4 (05 Apr 2025 04:44), Max: 98 (04 Apr 2025 13:02)  HR: 81 (05 Apr 2025 05:56) (66 - 96)  BP: 150/76 (05 Apr 2025 05:56) (110/64 - 162/78)  BP(mean): --  RR: 18 (05 Apr 2025 04:44) (15 - 18)  SpO2: 96% (05 Apr 2025 04:44) (96% - 100%)    Parameters below as of 05 Apr 2025 04:44  Patient On (Oxygen Delivery Method): room air        04-04-25 @ 07:01  -  04-05-25 @ 07:00  --------------------------------------------------------  IN: 500 mL / OUT: 1800 mL / NET: -1300 mL          04-05    130[L]  |  87[L]  |  73[H]  ----------------------------<  232[H]  5.2   |  29  |  2.61[H]    Ca    9.5      05 Apr 2025 06:46        Urinalysis Basic - ( 05 Apr 2025 06:46 )    Color: x / Appearance: x / SG: x / pH: x  Gluc: 232 mg/dL / Ketone: x  / Bili: x / Urobili: x   Blood: x / Protein: x / Nitrite: x   Leuk Esterase: x / RBC: x / WBC x   Sq Epi: x / Non Sq Epi: x / Bacteria: x      Focused Physical Exam:  No apparent distress, alert and oriented times three, appropriate affect  right groin: Soft, non tender, no bleeding or hematoma, clean/dry/intact- RLE/LLE +2 palpable femoral pulse  No clubbing, cyanosis or edema      Assessment/Plan: HPI:  69M PMHx CAD s/p PCI, HFrEF, afib, HTN, T2DM, CKD3. Was recently admitted 1/31-2/7 ADHF, also found w/ diminshed pulse b/l feet with foot wound, seen by vasc, consistent w/ PAD, no acute surgical mgnt.   Pt returns today w/ SOB and BLE swelling, as well as left foot wound that's worsened since last admission.   Seen by nephro here, rec bumex gtt.   Seen by vasc here, no acute surgical intervention, pods c/s for wound care.  (26 Mar 2025 21:04)    4/4/25 s/p MEMS and RHC via [facility]    - right femoral site is stable  - Recommend a heart healthy diet which includes a variety of fruits and vegetables, whole grains, low fat dairy products, legumes and skinless poulty and fish; food prepared with little or no salt and minimize processed foods  - Avoid using NSAIDs  (Aleve, Motrin, ibuprofen, naproxen) while on DAPT, please utilize Tylenol for pain control (not to exceed 4gm in 24 hours)  - Reviewed and reinforced with patient:  site complications ( eg: bleeding, excruciating pain at the procedural site, large swelling ball size-  extremity numbness, tingling, temperature change), or CHEST PAIN; pt aware that if any of those occur he/she must call cardiologist IMMEDIATELY or 911 or go to nearest emergency room   - Patient verbalizes understanding of ALL OF THE ABOVE, and gives positive feedback   -please make sure DAPT is prescribed to pt's preferred pharmacy on dc   -f/u appt in 2 weeks post dc with outpt cardiologist  - Keep Mg >2 K>4   - cont to monitor on tele  - all other care as per primary     Please check [email or web address] password [email or web address] for cardiology service schedule and contact information via TEAMS.

## 2025-04-05 NOTE — PROGRESS NOTE ADULT - ASSESSMENT
Assessment  DMT2: 70y Male with DM T2 with hyperglycemia admitted with SOB,  patient was on oral hypoglycemic agents at home, now on  insulin coverage, blood sugars are fluctuating due to inconsistent food intake.  Patient is high risk with high level decision making due to uncontrolled diabetes, has increased risk for complications.   CAD: on medications, monitored, no acute events.  HTN: On antihypertensive medications, monitored, stable.    Myrna Rossi MD  Cell: [phone]  Office: [phone]

## 2025-04-06 NOTE — PROGRESS NOTE ADULT - ASSESSMENT
·  Problem: Acute decompensated heart failure. :  questionable compliance   Bumex drip : clinically much improved        fu Cardiomems :and consider dcing drip   monitor Cr    check Va duplex:   Negative   fu with HF team     ·  Problem: Wound of left foot: Left dorsal foot ulceration: Stable, noninfected, present on admission  local wound care per pod       ·  Problem: Type 2 diabetes mellitus.: uncontrolled  A1c  worse than in the past hence questionable complaince   endo consulted     ·  Problem: Afib.  ·  Plan: -c/w xarelto and toprol.: ???  need to stop xarelto for cardioMems placement :  restart today if cleared by Cards     ·  Problem: CAD (coronary artery disease).  ·  Plan: -c/w asa 81mg       Stage 4 chronic kidney disease.   ·  Plan: -stable. eval by nephro.   -dose meds per renal fx.

## 2025-04-06 NOTE — PROGRESS NOTE ADULT - SUBJECTIVE AND OBJECTIVE BOX
No dyspnea on RA   OOB to chair  /79  HR 75  Lungs clear  Irregular rhythm  1+ edema (improved)    BUN 75  Crt 2.64  Na 134  CO2  27    Imp:  Severely depressed LV function and CO   severe pulm HTN with fluid overload ( RA 26  PCWP 30)  Renal function may have peaked and is now slightly improved  Continue IV Bumex and Isordil / Hydralazine  Check Cardio Mems in AM

## 2025-04-06 NOTE — PROGRESS NOTE ADULT - ASSESSMENT
Assessment  DMT2: 70y Male with DM T2 with hyperglycemia admitted with SOB,  patient was on oral hypoglycemic agents at home, now on  insulin coverage, blood sugars are fluctuating due to inconsistent food intake.  Patient is high risk with high level decision making due to uncontrolled diabetes, has increased risk for complications.   CAD: on medications, monitored, no acute events.  HTN: On antihypertensive medications, monitored, stable.      Discussed plan and management with Dr Flo Jamil NP - TEAMS  Myrna Rossi MD  Cell: [phone]  Office: [phone]    Assessment  DMT2: 70y Male with DM T2 with hyperglycemia admitted with SOB,  patient was on oral hypoglycemic agents at home, now on  insulin coverage, blood sugars are fluctuating due to inconsistent food intake.  Patient is high risk with high level decision making due to uncontrolled diabetes, has increased risk for complications.   CAD: on medications, monitored, no acute events.  HTN: On antihypertensive medications, monitored, stable.      Discussed plan and management with Dr Flo Jaiml NP - TEAMS  Myrna Rossi MD  Cell: [phone]  Office: [phone]

## 2025-04-06 NOTE — PROGRESS NOTE ADULT - SUBJECTIVE AND OBJECTIVE BOX
Chief complaint  Patient is a 70y old  Male who presents with a chief complaint of CHF (06 Apr 2025 11:30)         Labs and Fingersticks  CAPILLARY BLOOD GLUCOSE      POCT Blood Glucose.: 71 mg/dL (06 Apr 2025 12:53)  POCT Blood Glucose.: 204 mg/dL (06 Apr 2025 08:35)  POCT Blood Glucose.: 196 mg/dL (05 Apr 2025 21:26)  POCT Blood Glucose.: 73 mg/dL (05 Apr 2025 17:18)      Anion Gap: 19 *H* (04-06 @ 06:52)  Anion Gap: 15 (04-05 @ 17:46)  Anion Gap: 14 (04-05 @ 06:46)  Anion Gap: 13 (04-04 @ 23:15)      Calcium: 9.9 (04-06 @ 06:52)  Calcium: 9.8 (04-05 @ 17:46)  Calcium: 9.5 (04-05 @ 06:46)  Calcium: 9.6 (04-04 @ 23:15)          04-06    134[L]  |  88[L]  |  75[H]  ----------------------------<  142[H]  4.2   |  27  |  2.64[H]    Ca    9.9      06 Apr 2025 06:52      Medications  MEDICATIONS  (STANDING):  AQUAPHOR (petrolatum Ointment) 1 Application(s) Topical two times a day  aspirin  chewable 81 milliGRAM(s) Oral daily  buMETAnide Infusion 1 mG/Hr (5 mL/Hr) IV Continuous <Continuous>  dextrose 5%. 1000 milliLiter(s) (50 mL/Hr) IV Continuous <Continuous>  dextrose 5%. 1000 milliLiter(s) (100 mL/Hr) IV Continuous <Continuous>  dextrose 50% Injectable 25 Gram(s) IV Push once  dextrose 50% Injectable 12.5 Gram(s) IV Push once  dextrose 50% Injectable 25 Gram(s) IV Push once  glucagon  Injectable 1 milliGRAM(s) IntraMuscular once  hydrALAZINE 25 milliGRAM(s) Oral three times a day  insulin glargine Injectable (LANTUS) 15 Unit(s) SubCutaneous at bedtime  insulin lispro (ADMELOG) corrective regimen sliding scale   SubCutaneous three times a day before meals  insulin lispro (ADMELOG) corrective regimen sliding scale   SubCutaneous at bedtime  insulin lispro Injectable (ADMELOG) 9 Unit(s) SubCutaneous three times a day before meals  isosorbide   dinitrate Tablet (ISORDIL) 30 milliGRAM(s) Oral three times a day  metoprolol succinate ER 75 milliGRAM(s) Oral daily  Nephro-daniel 1 Tablet(s) Oral daily  ranolazine 500 milliGRAM(s) Oral two times a day  rivaroxaban 15 milliGRAM(s) Oral with dinner  spironolactone 25 milliGRAM(s) Oral daily      Physical Exam  General: Patient comfortable in bed   Vital Signs Last 12 Hrs  T(F): 98.2 (04-06-25 @ 11:32), Max: 98.2 (04-06-25 @ 11:32)  HR: 76 (04-06-25 @ 11:32) (59 - 76)  BP: 128/76 (04-06-25 @ 11:32) (128/76 - 135/79)  BP(mean): --  RR: 18 (04-06-25 @ 11:32) (18 - 18)  SpO2: 96% (04-06-25 @ 11:32) (96% - 96%)    CVS: S1S2   Respiratory: No wheezing, no crepitations  GI: Abdomen soft, bowel sounds positive  Musculoskeletal:  moves all extremities  : Voiding        Chief complaint  Patient is a 70y old  Male who presents with a chief complaint of CHF (06 Apr 2025 11:30)         Labs and Fingersticks  CAPILLARY BLOOD GLUCOSE      POCT Blood Glucose.: 71 mg/dL (06 Apr 2025 12:53)  POCT Blood Glucose.: 204 mg/dL (06 Apr 2025 08:35)  POCT Blood Glucose.: 196 mg/dL (05 Apr 2025 21:26)  POCT Blood Glucose.: 73 mg/dL (05 Apr 2025 17:18)      Anion Gap: 19 *H* (04-06 @ 06:52)  Anion Gap: 15 (04-05 @ 17:46)  Anion Gap: 14 (04-05 @ 06:46)  Anion Gap: 13 (04-04 @ 23:15)      Calcium: 9.9 (04-06 @ 06:52)  Calcium: 9.8 (04-05 @ 17:46)  Calcium: 9.5 (04-05 @ 06:46)  Calcium: 9.6 (04-04 @ 23:15)          04-06    134[L]  |  88[L]  |  75[H]  ----------------------------<  142[H]  4.2   |  27  |  2.64[H]    Ca    9.9      06 Apr 2025 06:52      Medications  MEDICATIONS  (STANDING):  AQUAPHOR (petrolatum Ointment) 1 Application(s) Topical two times a day  aspirin  chewable 81 milliGRAM(s) Oral daily  buMETAnide Infusion 1 mG/Hr (5 mL/Hr) IV Continuous <Continuous>  dextrose 5%. 1000 milliLiter(s) (50 mL/Hr) IV Continuous <Continuous>  dextrose 5%. 1000 milliLiter(s) (100 mL/Hr) IV Continuous <Continuous>  dextrose 50% Injectable 25 Gram(s) IV Push once  dextrose 50% Injectable 12.5 Gram(s) IV Push once  dextrose 50% Injectable 25 Gram(s) IV Push once  glucagon  Injectable 1 milliGRAM(s) IntraMuscular once  hydrALAZINE 25 milliGRAM(s) Oral three times a day  insulin glargine Injectable (LANTUS) 15 Unit(s) SubCutaneous at bedtime  insulin lispro (ADMELOG) corrective regimen sliding scale   SubCutaneous three times a day before meals  insulin lispro (ADMELOG) corrective regimen sliding scale   SubCutaneous at bedtime  insulin lispro Injectable (ADMELOG) 9 Unit(s) SubCutaneous three times a day before meals  isosorbide   dinitrate Tablet (ISORDIL) 30 milliGRAM(s) Oral three times a day  metoprolol succinate ER 75 milliGRAM(s) Oral daily  Nephro-daniel 1 Tablet(s) Oral daily  ranolazine 500 milliGRAM(s) Oral two times a day  rivaroxaban 15 milliGRAM(s) Oral with dinner  spironolactone 25 milliGRAM(s) Oral daily      Physical Exam  General: Patient comfortable in bed   Vital Signs Last 12 Hrs  T(F): 98.2 (04-06-25 @ 11:32), Max: 98.2 (04-06-25 @ 11:32)  HR: 76 (04-06-25 @ 11:32) (59 - 76)  BP: 128/76 (04-06-25 @ 11:32) (128/76 - 135/79)  BP(mean): --  RR: 18 (04-06-25 @ 11:32) (18 - 18)  SpO2: 96% (04-06-25 @ 11:32) (96% - 96%)    CVS: S1S2   Respiratory: No wheezing, no crepitations  GI: Abdomen soft, bowel sounds positive  Musculoskeletal:  moves all extremities  : Voiding

## 2025-04-06 NOTE — PROGRESS NOTE ADULT - PROBLEM SELECTOR PLAN 1
Will decreas eLantus to 15 units at bed time.  Will decrease Admelog to 9 units before each meal in addition to Admelog correction scale coverage.  Patient counseled for compliance with consistent low carb diet and physical activity as tolerated.

## 2025-04-06 NOTE — PROGRESS NOTE ADULT - SUBJECTIVE AND OBJECTIVE BOX
Date of service: 25 @ 16:12      Patient is a 70y old  Male who presents with a chief complaint of CHF (2025 11:30)                                                               INTERVAL HPI/OVERNIGHT EVENTS:    REVIEW OF SYSTEMS:     no compalints                                                                                                                                                                                                                                                                               Medications:  MEDICATIONS  (STANDING):  AQUAPHOR (petrolatum Ointment) 1 Application(s) Topical two times a day  aspirin  chewable 81 milliGRAM(s) Oral daily  buMETAnide Infusion 1 mG/Hr (5 mL/Hr) IV Continuous <Continuous>  dextrose 5%. 1000 milliLiter(s) (50 mL/Hr) IV Continuous <Continuous>  dextrose 5%. 1000 milliLiter(s) (100 mL/Hr) IV Continuous <Continuous>  dextrose 50% Injectable 25 Gram(s) IV Push once  dextrose 50% Injectable 12.5 Gram(s) IV Push once  dextrose 50% Injectable 25 Gram(s) IV Push once  glucagon  Injectable 1 milliGRAM(s) IntraMuscular once  hydrALAZINE 25 milliGRAM(s) Oral three times a day  insulin glargine Injectable (LANTUS) 15 Unit(s) SubCutaneous at bedtime  insulin lispro (ADMELOG) corrective regimen sliding scale   SubCutaneous three times a day before meals  insulin lispro (ADMELOG) corrective regimen sliding scale   SubCutaneous at bedtime  insulin lispro Injectable (ADMELOG) 9 Unit(s) SubCutaneous three times a day before meals  isosorbide   dinitrate Tablet (ISORDIL) 30 milliGRAM(s) Oral three times a day  metoprolol succinate ER 75 milliGRAM(s) Oral daily  Nephro-daniel 1 Tablet(s) Oral daily  ranolazine 500 milliGRAM(s) Oral two times a day  rivaroxaban 15 milliGRAM(s) Oral with dinner  spironolactone 25 milliGRAM(s) Oral daily    MEDICATIONS  (PRN):  acetaminophen     Tablet .. 650 milliGRAM(s) Oral every 6 hours PRN Temp greater or equal to 38C (100.4F), Mild Pain (1 - 3)  aluminum hydroxide/magnesium hydroxide/simethicone Suspension 30 milliLiter(s) Oral every 4 hours PRN Dyspepsia  dextrose Oral Gel 15 Gram(s) Oral once PRN Blood Glucose LESS THAN 70 milliGRAM(s)/deciliter  melatonin 3 milliGRAM(s) Oral at bedtime PRN Insomnia  ondansetron Injectable 4 milliGRAM(s) IV Push every 8 hours PRN Nausea and/or Vomiting       Allergies    atorvastatin (Muscle Pain (Severe))    Intolerances      Vital Signs Last 24 Hrs  T(C): 36.8 (2025 11:32), Max: 36.8 (2025 11:32)  T(F): 98.2 (2025 11:32), Max: 98.2 (2025 11:32)  HR: 81 (2025 15:58) (59 - 81)  BP: 163/77 (2025 15:58) (120/69 - 163/77)  BP(mean): --  RR: 18 (2025 15:58) (18 - 18)  SpO2: 96% (2025 15:58) (96% - 97%)    Parameters below as of 2025 15:58  Patient On (Oxygen Delivery Method): room air      CAPILLARY BLOOD GLUCOSE      POCT Blood Glucose.: 71 mg/dL (2025 12:53)  POCT Blood Glucose.: 204 mg/dL (2025 08:35)  POCT Blood Glucose.: 196 mg/dL (2025 21:26)  POCT Blood Glucose.: 73 mg/dL (2025 17:18)       @ 07: @ 07:00  --------------------------------------------------------  IN: 180 mL / OUT: 1100 mL / NET: -920 mL     @ 07:  -   @ 16:12  --------------------------------------------------------  IN: 580 mL / OUT: 1850 mL / NET: -1270 mL      Physical Exam:    Daily     Daily Weight in k.3 (2025 04:21)  General:  Well appearing, NAD, not cachetic  HEENT:  Nonicteric, PERRLA  CV:  RRR, S1S2   Lungs:  CTA B/L, no wheezes, rales, rhonchi  Abdomen:  Soft, non-tender, no distended, positive BS  Extremities:  trace edema                                                                                                                                                                                                                                                                                         LABS:                                134[L]  |  88[L]  |  75[H]  ----------------------------<  142[H]  4.2   |  27  |  2.64[H]    Ca    9.9      2025 06:52                         RADIOLOGY & ADDITIONAL TESTS         I personally reviewed: [  ]EKG   [  ]CXR    [  ] CT      A/P:         Discussed with :     Derek consultants' Notes   Time spent :

## 2025-04-07 NOTE — PROGRESS NOTE ADULT - SUBJECTIVE AND OBJECTIVE BOX
SUBJECTIVE:  Asymptomatic lying flat. Edema resolved.   	  MEDICATIONS:  buMETAnide Infusion 1 mG/Hr IV Continuous <Continuous>  hydrALAZINE 25 milliGRAM(s) Oral three times a day  isosorbide   dinitrate Tablet (ISORDIL) 30 milliGRAM(s) Oral three times a day  metoprolol succinate ER 75 milliGRAM(s) Oral daily  ranolazine 500 milliGRAM(s) Oral two times a day  spironolactone 25 milliGRAM(s) Oral daily  acetaminophen     Tablet .. 650 milliGRAM(s) Oral every 6 hours PRN  melatonin 3 milliGRAM(s) Oral at bedtime PRN  ondansetron Injectable 4 milliGRAM(s) IV Push every 8 hours PRN  aluminum hydroxide/magnesium hydroxide/simethicone Suspension 30 milliLiter(s) Oral every 4 hours PRN  dextrose 50% Injectable 25 Gram(s) IV Push once  dextrose 50% Injectable 12.5 Gram(s) IV Push once  dextrose 50% Injectable 25 Gram(s) IV Push once  dextrose Oral Gel 15 Gram(s) Oral once PRN  glucagon  Injectable 1 milliGRAM(s) IntraMuscular once  insulin glargine Injectable (LANTUS) 15 Unit(s) SubCutaneous at bedtime  insulin lispro (ADMELOG) corrective regimen sliding scale   SubCutaneous three times a day before meals  insulin lispro (ADMELOG) corrective regimen sliding scale   SubCutaneous at bedtime  insulin lispro Injectable (ADMELOG) 9 Unit(s) SubCutaneous three times a day before meals  AQUAPHOR (petrolatum Ointment) 1 Application(s) Topical two times a day  aspirin  chewable 81 milliGRAM(s) Oral daily  dextrose 5%. 1000 milliLiter(s) IV Continuous <Continuous>  dextrose 5%. 1000 milliLiter(s) IV Continuous <Continuous>  Nephro-daniel 1 Tablet(s) Oral daily  rivaroxaban 15 milliGRAM(s) Oral with dinner      REVIEW OF SYSTEMS:    CONSTITUTIONAL: No fever, weight loss, or fatigue  EYES: No eye pain, visual disturbances, or discharge  NECK: No pain or stiffness  RESPIRATORY: No cough, wheezing, chills or hemoptysis; No Shortness of Breath  CARDIOVASCULAR: No chest pain, palpitations, dizziness, or leg swelling  GASTROINTESTINAL: No abdominal or epigastric pain. No nausea, vomiting, or hematemesis; No diarrhea or constipation. No melena or hematochezia.  GENITOURINARY: No dysuria, frequency, hematuria, or incontinence  NEUROLOGICAL: No headaches, memory loss, loss of strength, numbness, or tremors  SKIN: No itching, burning, rashes, or lesions   LYMPH Nodes: No enlarged glands  MUSCULOSKELETAL: No joint pain or swelling; No muscle, back, or extremity pain  All other review of systems are negative.  	    PHYSICAL EXAM:  T(C): 36.7 (04-07-25 @ 04:27), Max: 36.8 (04-06-25 @ 11:32)  HR: 74 (04-07-25 @ 04:27) (74 - 81)  BP: 132/80 (04-07-25 @ 04:27) (128/76 - 163/77)  RR: 18 (04-07-25 @ 04:27) (18 - 18)  SpO2: 100% (04-07-25 @ 04:27) (96% - 100%)  Wt(kg): --  I&O's Summary    06 Apr 2025 07:01  -  07 Apr 2025 07:00  --------------------------------------------------------  IN: 760 mL / OUT: 3450 mL / NET: -2690 mL          PHYSICAL EXAM    Appearance: Normal	  HEENT:   Normal oral mucosa, PERRL, EOMI	  NECK: Soft and supple, No LAD, No JVD  Cardiovascular: Regular Rate and Rhythm, Normal S1 S2, No murmurs, No clicks, gallops or rubs  Respiratory: Lungs clear to auscultation	  Extremities: No clubbing, cyanosis or edema        04-07    131[L]  |  89[L]  |  74[H]  ----------------------------<  108[H]  4.1   |  25  |  2.78[H]    Ca    9.6      07 Apr 2025 07:07  Mg     2.8     04-07

## 2025-04-07 NOTE — PROGRESS NOTE ADULT - ASSESSMENT
Assessment  DMT2: 70y Male with DM T2 with hyperglycemia admitted with SOB,  patient was on oral hypoglycemic agents at home, now on  insulin coverage, blood sugars are fluctuating due to inconsistent food intake.  Patient is high risk with high level decision making due to uncontrolled diabetes, has increased risk for complications.   CAD: on medications, monitored, no acute events.  HTN: On antihypertensive medications, monitored, stable.      Discussed plan and management with Dr Flo Jamil NP - TEAMS  Myrna Rossi MD  Cell: [phone]  Office: [phone]      Assessment  DMT2: 70y Male with DM T2 with hyperglycemia admitted with SOB,  patient was on oral hypoglycemic agents at home, now on  insulin coverage, blood sugars are fluctuating due to inconsistent food intake.  Patient is high risk with high level decision making due to uncontrolled diabetes, has increased risk for complications.   CAD: on medications, monitored, no acute events.  HTN: On antihypertensive medications, monitored, stable.      Discussed plan and management with Dr Flo Jamil NP - TEAMS  Myrna Rossi MD  Cell: [phone]  Office: [phone]

## 2025-04-07 NOTE — PROGRESS NOTE ADULT - ASSESSMENT
Outpatient cardiologist: Dr. Alfred Moon     Mr Velázquez is a 70 M, with hx of HFmrEF, and PMH of CAD (hx of multiple stents, occluded 100% dLAD not amenable to PCI), Afib (on xarelto), T2DM, CKD4, HTN, known PAD s/p recent LLE with PT and peroneal stent placement (1/23), is here for SOB and BLE for ADHF and also for L foot wound. Patient has recent admission 1/31 to 2/7 for ADHF.     He's been receiving IV diuretics for volume overload. He underwent RHC and cardiomems on 4/4 which showed significantly elevated filling pressures and low output for which he was put on a bumex gtt and his hydralazine dose was increased for further afterload reduction. His weight is unchanged from the time of the cath despite good urine output of 3.5L/24 hours. He remains volume overloaded with severely elevated JVP, although LE edema improving. He's hypertensive with room for further afterload reduction.     Cardiac Studies:  4/4/25 RHC/cardiomems (formal report pending) RA 28, RV 85/6, PA 78/32 (47), PCWP 30, CO/CI: 2.72/1.67    C 2/4/25: occluded dLAD, D1 100% stenosis  -1/29/24: patient prior stents in the LM, LAD and Cx, severe disease dLAD in a smaller caliber sized vessel unchanged from prior [facility] that is not amenable to PCI   TTE 1/20/25: LVIDd 3.7cm, LVEF 40-45%, RV nml size, borderline reduced RV function, TAPSE 1.1 cm, mildly dilated LA, dilated RA, mild/mod MR, mild TR, PASP 49, IVC nml  -TTE 6/24/24: LVIDd 3.8cm, LVEF 45-50%, nml RV size/function, nml atria, mild MR, mild TR,

## 2025-04-07 NOTE — PROGRESS NOTE ADULT - SUBJECTIVE AND OBJECTIVE BOX
Chief complaint  Patient is a 70y old  Male who presents with a chief complaint of CHF (06 Apr 2025 11:30)         Labs and Fingersticks  CAPILLARY BLOOD GLUCOSE      POCT Blood Glucose.: 99 mg/dL (07 Apr 2025 12:53)  POCT Blood Glucose.: 142 mg/dL (07 Apr 2025 08:52)  POCT Blood Glucose.: 115 mg/dL (06 Apr 2025 21:31)  POCT Blood Glucose.: 125 mg/dL (06 Apr 2025 17:29)      Anion Gap: 17 (04-07 @ 07:07)  Anion Gap: 19 *H* (04-06 @ 06:52)  Anion Gap: 15 (04-05 @ 17:46)      Calcium: 9.6 (04-07 @ 07:07)  Calcium: 9.9 (04-06 @ 06:52)  Calcium: 9.8 (04-05 @ 17:46)          04-07    131[L]  |  89[L]  |  74[H]  ----------------------------<  108[H]  4.1   |  25  |  2.78[H]    Ca    9.6      07 Apr 2025 07:07  Mg     2.8     04-07      Medications  MEDICATIONS  (STANDING):  AQUAPHOR (petrolatum Ointment) 1 Application(s) Topical two times a day  aspirin  chewable 81 milliGRAM(s) Oral daily  buMETAnide Infusion 2 mG/Hr (10 mL/Hr) IV Continuous <Continuous>  dextrose 5%. 1000 milliLiter(s) (50 mL/Hr) IV Continuous <Continuous>  dextrose 5%. 1000 milliLiter(s) (100 mL/Hr) IV Continuous <Continuous>  dextrose 50% Injectable 25 Gram(s) IV Push once  dextrose 50% Injectable 12.5 Gram(s) IV Push once  dextrose 50% Injectable 25 Gram(s) IV Push once  glucagon  Injectable 1 milliGRAM(s) IntraMuscular once  hydrALAZINE 50 milliGRAM(s) Oral three times a day  insulin glargine Injectable (LANTUS) 15 Unit(s) SubCutaneous at bedtime  insulin lispro (ADMELOG) corrective regimen sliding scale   SubCutaneous three times a day before meals  insulin lispro (ADMELOG) corrective regimen sliding scale   SubCutaneous at bedtime  insulin lispro Injectable (ADMELOG) 9 Unit(s) SubCutaneous three times a day before meals  isosorbide   dinitrate Tablet (ISORDIL) 30 milliGRAM(s) Oral three times a day  metoprolol succinate ER 75 milliGRAM(s) Oral daily  Nephro-daniel 1 Tablet(s) Oral daily  ranolazine 500 milliGRAM(s) Oral two times a day  rivaroxaban 15 milliGRAM(s) Oral with dinner  spironolactone 25 milliGRAM(s) Oral two times a day      Physical Exam  General: Patient comfortable in bed  Vital Signs Last 12 Hrs  T(F): 98 (04-07-25 @ 11:51), Max: 98 (04-07-25 @ 04:27)  HR: 70 (04-07-25 @ 11:51) (70 - 74)  BP: 118/72 (04-07-25 @ 11:51) (118/72 - 132/80)  BP(mean): 87 (04-07-25 @ 11:51) (87 - 87)  RR: 18 (04-07-25 @ 11:51) (18 - 18)  SpO2: 97% (04-07-25 @ 11:51) (97% - 100%)    CVS: S1S2   Respiratory: No wheezing, no crepitations  GI: Abdomen soft, bowel sounds positive  Musculoskeletal:  moves all extremities  : Voiding        Chief complaint  Patient is a 70y old  Male who presents with a chief complaint of CHF (06 Apr 2025 11:30)         Labs and Fingersticks  CAPILLARY BLOOD GLUCOSE      POCT Blood Glucose.: 99 mg/dL (07 Apr 2025 12:53)  POCT Blood Glucose.: 142 mg/dL (07 Apr 2025 08:52)  POCT Blood Glucose.: 115 mg/dL (06 Apr 2025 21:31)  POCT Blood Glucose.: 125 mg/dL (06 Apr 2025 17:29)      Anion Gap: 17 (04-07 @ 07:07)  Anion Gap: 19 *H* (04-06 @ 06:52)  Anion Gap: 15 (04-05 @ 17:46)      Calcium: 9.6 (04-07 @ 07:07)  Calcium: 9.9 (04-06 @ 06:52)  Calcium: 9.8 (04-05 @ 17:46)          04-07    131[L]  |  89[L]  |  74[H]  ----------------------------<  108[H]  4.1   |  25  |  2.78[H]    Ca    9.6      07 Apr 2025 07:07  Mg     2.8     04-07      Medications  MEDICATIONS  (STANDING):  AQUAPHOR (petrolatum Ointment) 1 Application(s) Topical two times a day  aspirin  chewable 81 milliGRAM(s) Oral daily  buMETAnide Infusion 2 mG/Hr (10 mL/Hr) IV Continuous <Continuous>  dextrose 5%. 1000 milliLiter(s) (50 mL/Hr) IV Continuous <Continuous>  dextrose 5%. 1000 milliLiter(s) (100 mL/Hr) IV Continuous <Continuous>  dextrose 50% Injectable 25 Gram(s) IV Push once  dextrose 50% Injectable 12.5 Gram(s) IV Push once  dextrose 50% Injectable 25 Gram(s) IV Push once  glucagon  Injectable 1 milliGRAM(s) IntraMuscular once  hydrALAZINE 50 milliGRAM(s) Oral three times a day  insulin glargine Injectable (LANTUS) 15 Unit(s) SubCutaneous at bedtime  insulin lispro (ADMELOG) corrective regimen sliding scale   SubCutaneous three times a day before meals  insulin lispro (ADMELOG) corrective regimen sliding scale   SubCutaneous at bedtime  insulin lispro Injectable (ADMELOG) 9 Unit(s) SubCutaneous three times a day before meals  isosorbide   dinitrate Tablet (ISORDIL) 30 milliGRAM(s) Oral three times a day  metoprolol succinate ER 75 milliGRAM(s) Oral daily  Nephro-daniel 1 Tablet(s) Oral daily  ranolazine 500 milliGRAM(s) Oral two times a day  rivaroxaban 15 milliGRAM(s) Oral with dinner  spironolactone 25 milliGRAM(s) Oral two times a day      Physical Exam  General: Patient comfortable in bed  Vital Signs Last 12 Hrs  T(F): 98 (04-07-25 @ 11:51), Max: 98 (04-07-25 @ 04:27)  HR: 70 (04-07-25 @ 11:51) (70 - 74)  BP: 118/72 (04-07-25 @ 11:51) (118/72 - 132/80)  BP(mean): 87 (04-07-25 @ 11:51) (87 - 87)  RR: 18 (04-07-25 @ 11:51) (18 - 18)  SpO2: 97% (04-07-25 @ 11:51) (97% - 100%)    CVS: S1S2   Respiratory: No wheezing, no crepitations  GI: Abdomen soft, bowel sounds positive  Musculoskeletal:  moves all extremities  : Voiding

## 2025-04-07 NOTE — PROGRESS NOTE ADULT - SUBJECTIVE AND OBJECTIVE BOX
Date of service: 25 @ 17:59      Patient is a 70y old  Male who presents with a chief complaint of CHF (2025 11:30)                                                               INTERVAL HPI/OVERNIGHT EVENTS:    REVIEW OF SYSTEMS:     CONSTITUTIONAL: No weakness, fevers or chills  EYES/ENT: No visual changes , no ear ache   NECK: No pain or stiffness  RESPIRATORY: No cough, wheezing,  No shortness of breath  CARDIOVASCULAR: No chest pain or palpitations  GASTROINTESTINAL: No abdominal pain  . No nausea, vomiting, or hematemesis; No diarrhea or constipation. No melena or hematochezia.  GENITOURINARY: No dysuria, frequency or hematuria  NEUROLOGICAL: No numbness or weakness  SKIN: No itching, burning, rashes, or lesions                                                                                                                                                                                                                                                                                 Medications:  MEDICATIONS  (STANDING):  AQUAPHOR (petrolatum Ointment) 1 Application(s) Topical two times a day  aspirin  chewable 81 milliGRAM(s) Oral daily  buMETAnide Infusion 2 mG/Hr (10 mL/Hr) IV Continuous <Continuous>  dextrose 5%. 1000 milliLiter(s) (50 mL/Hr) IV Continuous <Continuous>  dextrose 5%. 1000 milliLiter(s) (100 mL/Hr) IV Continuous <Continuous>  dextrose 50% Injectable 25 Gram(s) IV Push once  dextrose 50% Injectable 12.5 Gram(s) IV Push once  dextrose 50% Injectable 25 Gram(s) IV Push once  glucagon  Injectable 1 milliGRAM(s) IntraMuscular once  hydrALAZINE 50 milliGRAM(s) Oral three times a day  insulin glargine Injectable (LANTUS) 15 Unit(s) SubCutaneous at bedtime  insulin lispro (ADMELOG) corrective regimen sliding scale   SubCutaneous three times a day before meals  insulin lispro (ADMELOG) corrective regimen sliding scale   SubCutaneous at bedtime  insulin lispro Injectable (ADMELOG) 9 Unit(s) SubCutaneous three times a day before meals  isosorbide   dinitrate Tablet (ISORDIL) 30 milliGRAM(s) Oral three times a day  metoprolol succinate ER 75 milliGRAM(s) Oral daily  Nephro-daniel 1 Tablet(s) Oral daily  ranolazine 500 milliGRAM(s) Oral two times a day  rivaroxaban 15 milliGRAM(s) Oral with dinner  spironolactone 25 milliGRAM(s) Oral two times a day    MEDICATIONS  (PRN):  acetaminophen     Tablet .. 650 milliGRAM(s) Oral every 6 hours PRN Temp greater or equal to 38C (100.4F), Mild Pain (1 - 3)  aluminum hydroxide/magnesium hydroxide/simethicone Suspension 30 milliLiter(s) Oral every 4 hours PRN Dyspepsia  dextrose Oral Gel 15 Gram(s) Oral once PRN Blood Glucose LESS THAN 70 milliGRAM(s)/deciliter  melatonin 3 milliGRAM(s) Oral at bedtime PRN Insomnia  ondansetron Injectable 4 milliGRAM(s) IV Push every 8 hours PRN Nausea and/or Vomiting       Allergies    atorvastatin (Muscle Pain (Severe))    Intolerances      Vital Signs Last 24 Hrs  T(C): 36.5 (2025 16:16), Max: 36.7 (2025 20:28)  T(F): 97.7 (2025 16:16), Max: 98 (2025 20:28)  HR: 70 (2025 16:16) (70 - 81)  BP: 145/67 (2025 16:16) (118/72 - 154/70)  BP(mean): 87 (2025 11:51) (87 - 87)  RR: 18 (2025 16:16) (18 - 18)  SpO2: 98% (2025 16:16) (97% - 100%)    Parameters below as of 2025 16:16  Patient On (Oxygen Delivery Method): room air      CAPILLARY BLOOD GLUCOSE      POCT Blood Glucose.: 162 mg/dL (2025 17:38)  POCT Blood Glucose.: 99 mg/dL (2025 12:53)  POCT Blood Glucose.: 142 mg/dL (2025 08:52)  POCT Blood Glucose.: 115 mg/dL (2025 21:31)       @ :  -   @ 07:00  --------------------------------------------------------  IN: 760 mL / OUT: 3450 mL / NET: -2690 mL     @ 07:01  -  04-07 @ 17:59  --------------------------------------------------------  IN: 0 mL / OUT: 600 mL / NET: -600 mL      Physical Exam:    Daily     Daily Weight in k.3 (2025 04:27)  General:  Well appearing, NAD, not cachetic  HEENT:  Nonicteric, PERRLA  CV:  RRR, S1S2   Lungs:  CTA B/L, no wheezes, rales, rhonchi  Abdomen:  Soft, non-tender, no distended, positive BS  Extremities: edema                                                                                                                                                                                                                                                                                              LABS:                                131[L]  |  89[L]  |  74[H]  ----------------------------<  108[H]  4.1   |  25  |  2.78[H]    Ca    9.6      2025 07:07  Mg     2.8                              RADIOLOGY & ADDITIONAL TESTS         I personally reviewed: [  ]EKG   [  ]CXR    [  ] CT      A/P:         Discussed with :     Derek consultants' Notes   Time spent :

## 2025-04-07 NOTE — PROGRESS NOTE ADULT - PROBLEM SELECTOR PLAN 1
-Etiology: likely ischemic  -GDMT: Increase hydralazine to 50mg TID, hold for SBP < 90 and increase spironolactone to 25mg BID  - Continue ISDN 30mg TID, Toprol XL 75mg daily.  -Eventual ARB/ARNi if renal function permits  -Will hold off on SGLT2-i for now given PAD with ulcers and fluctuating creatinine   -Diuretics: Please give bumex 2mg IV x 1 and increase gtt to 2mg/hour with close monitoring of electrolytes.   -Will recheck cardiomems tomorrow. Goal to be established once optimized.  -Strict I/Os and daily weights  -Keep K>4 and Mg>2.

## 2025-04-07 NOTE — PROGRESS NOTE ADULT - PROBLEM SELECTOR PLAN 1
Will continue current insulin regimen for now.   Patient counseled for compliance with consistent low carb diet and physical activity as tolerated.

## 2025-04-07 NOTE — PROGRESS NOTE ADULT - SUBJECTIVE AND OBJECTIVE BOX
ADVANCED HEART FAILURE & TRANSPLANT  - PROGRESS NOTE  *To reach the NS1 Team from 8am to 5pm, please call [phone].    ___________________________________________________________________________  Subjective:  - notes improving in LE edema  - reports feeling better, denies SOB at rest    Medications:  acetaminophen     Tablet .. 650 milliGRAM(s) Oral every 6 hours PRN  aluminum hydroxide/magnesium hydroxide/simethicone Suspension 30 milliLiter(s) Oral every 4 hours PRN  AQUAPHOR (petrolatum Ointment) 1 Application(s) Topical two times a day  aspirin  chewable 81 milliGRAM(s) Oral daily  buMETAnide Infusion 2 mG/Hr IV Continuous <Continuous>  dextrose 5%. 1000 milliLiter(s) IV Continuous <Continuous>  dextrose 5%. 1000 milliLiter(s) IV Continuous <Continuous>  dextrose 50% Injectable 25 Gram(s) IV Push once  dextrose 50% Injectable 12.5 Gram(s) IV Push once  dextrose 50% Injectable 25 Gram(s) IV Push once  dextrose Oral Gel 15 Gram(s) Oral once PRN  glucagon  Injectable 1 milliGRAM(s) IntraMuscular once  hydrALAZINE 50 milliGRAM(s) Oral three times a day  insulin glargine Injectable (LANTUS) 15 Unit(s) SubCutaneous at bedtime  insulin lispro (ADMELOG) corrective regimen sliding scale   SubCutaneous three times a day before meals  insulin lispro (ADMELOG) corrective regimen sliding scale   SubCutaneous at bedtime  insulin lispro Injectable (ADMELOG) 9 Unit(s) SubCutaneous three times a day before meals  isosorbide   dinitrate Tablet (ISORDIL) 30 milliGRAM(s) Oral three times a day  melatonin 3 milliGRAM(s) Oral at bedtime PRN  metoprolol succinate ER 75 milliGRAM(s) Oral daily  Nephro-daniel 1 Tablet(s) Oral daily  ondansetron Injectable 4 milliGRAM(s) IV Push every 8 hours PRN  ranolazine 500 milliGRAM(s) Oral two times a day  rivaroxaban 15 milliGRAM(s) Oral with dinner  spironolactone 25 milliGRAM(s) Oral two times a day      Physical Exam:    Vitals:  Vital Signs Last 24 Hours  T(C): 36.7 (25 @ 11:51), Max: 36.7 (25 @ 20:28)  HR: 70 (25 @ 11:51) (70 - 81)  BP: 118/72 (25 @ 11:51) (118/72 - 163/77)  RR: 18 (25 @ 11:51) (18 - 18)  SpO2: 97% (25 @ 11:51) (96% - 100%)    Weight in k.3 ( @ 04:27)    I&O's Summary    2025 07:01  -  2025 07:00  --------------------------------------------------------  IN: 760 mL / OUT: 3450 mL / NET: -2690 mL    Tele: afib    General: No distress. Comfortable.  HEENT: EOM intact.  Neck: Neck supple. JVP severely elevated, > 20 cm H2O No masses  Chest: Clear to auscultation bilaterally  CV: Normal S1 and S2. +1 tense BLE edema  Abdomen: Soft, non-distended, non-tender  Skin: Warm peripherally  Neurology: Alert and oriented times three. Sensation intact  Psych: Affect normal    Labs:        131[L]  |  89[L]  |  74[H]  ----------------------------<  108[H]  4.1   |  25  |  2.78[H]    Ca    9.6      2025 07:07  Mg     2.8

## 2025-04-07 NOTE — PROGRESS NOTE ADULT - SUBJECTIVE AND OBJECTIVE BOX
Overnight events noted      VITAL:  T(C): , Max: 36.8 (04-06-25 @ 11:32)  T(F): , Max: 98.2 (04-06-25 @ 11:32)  HR: 74 (04-07-25 @ 04:27)  BP: 132/80 (04-07-25 @ 04:27)  RR: 18 (04-07-25 @ 04:27)  SpO2: 100% (04-07-25 @ 04:27)  Urine output 3450cc/24h      PHYSICAL EXAM:  Constitutional: NAD, Alert  HEENT: NCAT, MMM  Neck: Supple, No JVD  Respiratory: CTA-b/l  Cardiovascular: RRR s1s2, no m/r/g  Gastrointestinal: BS+, soft, NT/ND  Extremities: 1-2+ b/l LE edema  Neurological: no focal deficits; strength grossly intact  Back: no CVAT b/l  Skin: No rashes, no nevi    LABS:    Na(131)/K(4.1)/Cl(89)/HCO3(25)/BUN(74)/Cr(2.78)Glu(108)/Ca(9.6)/Mg(2.8)/PO4(--)    04-07 @ 07:07  Na(134)/K(4.2)/Cl(88)/HCO3(27)/BUN(75)/Cr(2.64)Glu(142)/Ca(9.9)/Mg(--)/PO4(--)    04-06 @ 06:52  Na(132)/K(4.6)/Cl(89)/HCO3(28)/BUN(74)/Cr(2.76)Glu(61)/Ca(9.8)/Mg(--)/PO4(--)    04-05 @ 17:46  Na(130)/K(5.2)/Cl(87)/HCO3(29)/BUN(73)/Cr(2.61)Glu(232)/Ca(9.5)/Mg(--)/PO4(--)    04-05 @ 06:46  Na(127)/K(5.6)/Cl(87)/HCO3(27)/BUN(73)/Cr(2.46)Glu(455)/Ca(9.6)/Mg(--)/PO4(--)    04-04 @ 23:15        IMPRESSION: 70M w/ HTN, DM2, CAD, HFrEF, and CKD4, 3/26/25 p/w ADHF    (1)CKD - 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 - numbers fluctuating based on hemodynamics  (2)Lytes - accetpable  (3)CV - acute on chronic HFrEF - improved relative to admission, with Bumex gtt      RECOMMEND:  (1)Can advance to high-dose PO diuretics - Lasix 80mg po bid + Spironolactone 50mg po bid?  (2)Dose new meds for GFR 20-25ml/min  (3)BMP+Mg at least daily while admitted          Markell Walton MD  [facility]  Office/on call physician: [phone]  Cell (7a-7p): [phone]       No pain, no sob      VITAL:  T(C): , Max: 36.8 (04-06-25 @ 11:32)  T(F): , Max: 98.2 (04-06-25 @ 11:32)  HR: 74 (04-07-25 @ 04:27)  BP: 132/80 (04-07-25 @ 04:27)  RR: 18 (04-07-25 @ 04:27)  SpO2: 100% (04-07-25 @ 04:27)  Urine output 3450cc/24h      PHYSICAL EXAM:  Constitutional: NAD, Alert  HEENT: NCAT, MMM  Neck: Supple, No JVD  Respiratory: CTA-b/l  Cardiovascular: RRR s1s2, no m/r/g  Gastrointestinal: BS+, soft, NT/ND  Extremities: 1-2+ b/l LE edema  Neurological: no focal deficits; strength grossly intact  Back: no CVAT b/l  Skin: No rashes, no nevi    LABS:    Na(131)/K(4.1)/Cl(89)/HCO3(25)/BUN(74)/Cr(2.78)Glu(108)/Ca(9.6)/Mg(2.8)/PO4(--)    04-07 @ 07:07  Na(134)/K(4.2)/Cl(88)/HCO3(27)/BUN(75)/Cr(2.64)Glu(142)/Ca(9.9)/Mg(--)/PO4(--)    04-06 @ 06:52  Na(132)/K(4.6)/Cl(89)/HCO3(28)/BUN(74)/Cr(2.76)Glu(61)/Ca(9.8)/Mg(--)/PO4(--)    04-05 @ 17:46  Na(130)/K(5.2)/Cl(87)/HCO3(29)/BUN(73)/Cr(2.61)Glu(232)/Ca(9.5)/Mg(--)/PO4(--)    04-05 @ 06:46  Na(127)/K(5.6)/Cl(87)/HCO3(27)/BUN(73)/Cr(2.46)Glu(455)/Ca(9.6)/Mg(--)/PO4(--)    04-04 @ 23:15        IMPRESSION: 70M w/ HTN, DM2, CAD, HFrEF, and CKD4, 3/26/25 p/w ADHF    (1)CKD - 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 - numbers fluctuating based on hemodynamics  (2)Lytes - accetpable  (3)CV - acute on chronic HFrEF - improved relative to admission, with Bumex gtt      RECOMMEND:  (1)Can advance to high-dose PO diuretics - Lasix 80mg po bid + Spironolactone 50mg po bid?  (2)Dose new meds for GFR 20-25ml/min  (3)BMP+Mg at least daily while admitted          Markell Walton MD  [facility]  Office/on call physician: [phone]  Cell (7a-7p): [phone]

## 2025-04-07 NOTE — PROGRESS NOTE ADULT - ASSESSMENT
·  Problem: Acute decompensated heart failure. :  questionable compliance   Bumex drip   discussed with CHF team : will re check cardioMEMS and cont drip for now   monitor Cr    check Va duplex:   Negative   fu with HF team     ·  Problem: Wound of left foot: Left dorsal foot ulceration: Stable, noninfected, present on admission  local wound care per pod       ·  Problem: Type 2 diabetes mellitus.: uncontrolled  A1c  worse than in the past hence questionable complaince   endo consulted     ·  Problem: Afib.  ·  Plan: -c/w xarelto and toprol.: ???  need to stop xarelto for cardioMems placement :  restart today if cleared by Cards     ·  Problem: CAD (coronary artery disease).  ·  Plan: -c/w asa 81mg       Stage 4 chronic kidney disease.   ·  Plan: -stable. eval by nephro.   -dose meds per renal fx.

## 2025-04-07 NOTE — PROGRESS NOTE ADULT - ASSESSMENT
#h/o ashd s/p multiple pci, last pci dLAD 4/17/24 by Dr ILDA Dominguez, known severe inoperable CAD, not amenable to intervention.  #HTN  #DM (Dr Reyes)  #ckd (Dr Christophe Walton); bun/cr 74/2.78  #s/p cea by Dr Calle  # hfref exacerbation, now with MEMS,  noted  depressed LV function and CO, with severe pulm HTN and fluid overload ( RA 26  PCWP 30)  #AF , remains on xarelto.   #s/p Left foot infection   #s/p RSV   Continue diuresis. Close follow up renal function and electrolytes. Please check MEMS value this am.   Slow improvement.

## 2025-04-08 NOTE — PROGRESS NOTE ADULT - PROBLEM SELECTOR PLAN 1
-Etiology: likely ischemic  -GDMT: Increase hydralazine to 75mg TID, hold for SBP < 90    - Continue spironolactone to 25mg BID, ISDN 30mg TID, Toprol XL 75mg daily.  -Eventual ARB/ARNi if renal function permits  -Will hold off on SGLT2-i for now given PAD with ulcers and fluctuating creatinine   -Diuretics: Recommend hypertonic saline 3% 150ml/30 min Q12 hours x 2 doses and would continue bumex gtt to 2mg/hour with close monitoring of electrolytes.   -Will check cardiomems readings intermittently. Goal to be established once optimized.  -Strict I/Os and daily weights  -Keep K>4 and Mg>2.

## 2025-04-08 NOTE — PROGRESS NOTE ADULT - ASSESSMENT
Outpatient cardiologist: Dr. Alfred Moon     Mr Velázquez is a 70 M, with hx of HFmrEF, and PMH of CAD (hx of multiple stents, occluded 100% dLAD not amenable to PCI), Afib (on xarelto), T2DM, CKD4, HTN, known PAD s/p recent LLE with PT and peroneal stent placement (1/23), is here for SOB and BLE for ADHF and also for L foot wound. Patient has recent admission 1/31 to 2/7 for ADHF.     He's been receiving IV diuretics for volume overload. He underwent RHC and cardiomems on 4/4 which showed significantly elevated filling pressures and low output for which he was put on a bumex gtt and his hydralazine dose was increased for further afterload reduction. His weight is now downtrending, but only down about 1lb from the time of the cath despite high dose diuretics. He remains volume overloaded with elevated JVP and elevated cardiomems PAD of 39 today, 4/8, although LE edema has improved. He's hypertensive, which is contributing to elevated PAD, with room for further escalation of afterload reduction. Renal function stable.     Cardiac Studies:  4/4/25 RHC/cardiomems (formal report pending) RA 28, RV 85/6, PA 78/32 (47), PCWP 30, CO/CI: 2.72/1.67    [facility] 2/4/25: occluded dLAD, D1 100% stenosis  -1/29/24: patient prior stents in the LM, LAD and Cx, severe disease dLAD in a smaller caliber sized vessel unchanged from prior [facility] that is not amenable to PCI   TTE 1/20/25: LVIDd 3.7cm, LVEF 40-45%, RV nml size, borderline reduced RV function, TAPSE 1.1 cm, mildly dilated LA, dilated RA, mild/mod MR, mild TR, PASP 49, IVC nml  -TTE 6/24/24: LVIDd 3.8cm, LVEF 45-50%, nml RV size/function, nml atria, mild MR, mild TR,

## 2025-04-08 NOTE — PROGRESS NOTE ADULT - SUBJECTIVE AND OBJECTIVE BOX
SUBJECTIVE: Resting comfortably. Heart failure follow up noted.   	  MEDICATIONS:  buMETAnide Infusion 2 mG/Hr IV Continuous <Continuous>  hydrALAZINE 50 milliGRAM(s) Oral three times a day  isosorbide   dinitrate Tablet (ISORDIL) 30 milliGRAM(s) Oral three times a day  metoprolol succinate ER 75 milliGRAM(s) Oral daily  nitroglycerin     SubLingual 0.4 milliGRAM(s) SubLingual once  ranolazine 500 milliGRAM(s) Oral two times a day  spironolactone 25 milliGRAM(s) Oral two times a day  acetaminophen     Tablet .. 650 milliGRAM(s) Oral every 6 hours PRN  melatonin 3 milliGRAM(s) Oral at bedtime PRN  ondansetron Injectable 4 milliGRAM(s) IV Push every 8 hours PRN  aluminum hydroxide/magnesium hydroxide/simethicone Suspension 30 milliLiter(s) Oral every 4 hours PRN  dextrose 50% Injectable 25 Gram(s) IV Push once  dextrose 50% Injectable 12.5 Gram(s) IV Push once  dextrose 50% Injectable 25 Gram(s) IV Push once  dextrose Oral Gel 15 Gram(s) Oral once PRN  glucagon  Injectable 1 milliGRAM(s) IntraMuscular once  insulin glargine Injectable (LANTUS) 15 Unit(s) SubCutaneous at bedtime  insulin lispro (ADMELOG) corrective regimen sliding scale   SubCutaneous at bedtime  insulin lispro (ADMELOG) corrective regimen sliding scale   SubCutaneous three times a day before meals  insulin lispro Injectable (ADMELOG) 9 Unit(s) SubCutaneous three times a day before meals  AQUAPHOR (petrolatum Ointment) 1 Application(s) Topical two times a day  aspirin  chewable 81 milliGRAM(s) Oral daily  dextrose 5%. 1000 milliLiter(s) IV Continuous <Continuous>  dextrose 5%. 1000 milliLiter(s) IV Continuous <Continuous>  Nephro-daniel 1 Tablet(s) Oral daily  rivaroxaban 15 milliGRAM(s) Oral with dinner      REVIEW OF SYSTEMS:    CONSTITUTIONAL: No fever, weight loss, or fatigue  EYES: No eye pain, visual disturbances, or discharge  NECK: No pain or stiffness  RESPIRATORY: No cough, wheezing, chills or hemoptysis; No Shortness of Breath  CARDIOVASCULAR: No chest pain, palpitations, dizziness, or leg swelling  GASTROINTESTINAL: No abdominal or epigastric pain. No nausea, vomiting, or hematemesis; No diarrhea or constipation. No melena or hematochezia.  GENITOURINARY: No dysuria, frequency, hematuria, or incontinence  NEUROLOGICAL: No headaches, memory loss, loss of strength, numbness, or tremors  SKIN: No itching, burning, rashes, or lesions   LYMPH Nodes: No enlarged glands  MUSCULOSKELETAL: No joint pain or swelling; No muscle, back, or extremity pain  All other review of systems are negative.  	    PHYSICAL EXAM:  T(C): 36.4 (04-08-25 @ 04:15), Max: 36.7 (04-07-25 @ 11:51)  HR: 81 (04-08-25 @ 04:15) (70 - 81)  BP: 146/76 (04-08-25 @ 04:15) (118/72 - 165/71)  RR: 18 (04-08-25 @ 04:15) (18 - 18)  SpO2: 95% (04-08-25 @ 04:15) (95% - 99%)  Wt(kg): --  I&O's Summary    07 Apr 2025 07:01  -  08 Apr 2025 07:00  --------------------------------------------------------  IN: 300 mL / OUT: 2550 mL / NET: -2250 mL          PHYSICAL EXAM    Appearance: Normal	  HEENT:   Normal oral mucosa, PERRL, EOMI	  NECK: Soft and supple, No LAD, No JVD  Cardiovascular: Regular Rate and Rhythm, Normal S1 S2, No murmurs, No clicks, gallops or rubs  Respiratory: Lungs clear to auscultation	  Extremities: No clubbing, cyanosis or edema    LABS:	 	    04-08    133[L]  |  90[L]  |  76[H]  ----------------------------<  214[H]  3.8   |  23  |  2.71[H]    Ca    9.8      08 Apr 2025 07:21  Mg     2.8     04-08

## 2025-04-08 NOTE — PROGRESS NOTE ADULT - ASSESSMENT
·  Problem: Acute decompensated heart failure. :  questionable compliance   Bumex drip   discussed with CHF team : cardioMEMS still elevated :  bumex increased and hypertonic saline added   monitor Cr /Na  cont afterload reduction    check Va duplex:   Negative   fu with HF team     ·  Problem: Wound of left foot: Left dorsal foot ulceration: Stable, noninfected, present on admission  local wound care per pod       ·  Problem: Type 2 diabetes mellitus.: uncontrolled  A1c  worse than in the past hence questionable complaince   endo consulted     ·  Problem: Afib.  ·  Plan: -c/w xarelto and toprol.: ???  need to stop xarelto for cardioMems placement :  restart today if cleared by Cards     ·  Problem: CAD (coronary artery disease).  ·  Plan: -c/w asa 81mg       Stage 4 chronic kidney disease.   ·  Plan: -stable. eval by nephro.   -dose meds per renal fx.

## 2025-04-08 NOTE — PROGRESS NOTE ADULT - ASSESSMENT
Assessment  DMT2: 70y Male with DM T2 with hyperglycemia admitted with SOB,  patient was on oral hypoglycemic agents at home, now on  insulin coverage, blood sugars are fluctuating due to inconsistent food intake.  Patient is high risk with high level decision making due to uncontrolled diabetes, has increased risk for complications.   CAD: on medications, monitored, no acute events.  HTN: On antihypertensive medications, monitored, stable.      Discussed plan and management with Dr Flo Jamil NP - TEAMS  Myrna Rossi MD  Cell: [phone]  Office: [phone]    Assessment  DMT2: 70y Male with DM T2 with hyperglycemia admitted with SOB,  patient was on oral hypoglycemic agents at home, now on  insulin coverage, blood sugars are fluctuating due to inconsistent food intake.  Patient is high risk with high level decision making due to uncontrolled diabetes, has increased risk for complications.   CAD: on medications, monitored, no acute events.  HTN: On antihypertensive medications, monitored, stable.      Discussed plan and management with Dr Flo Jamil NP - TEAMS  Myrna Rossi MD  Cell: [phone]  Office: [phone]

## 2025-04-08 NOTE — PROGRESS NOTE ADULT - SUBJECTIVE AND OBJECTIVE BOX
Chief complaint  Patient is a 70y old  Male who presents with a chief complaint of CHF (06 Apr 2025 11:30)         Labs and Fingersticks  CAPILLARY BLOOD GLUCOSE      POCT Blood Glucose.: 169 mg/dL (08 Apr 2025 08:52)  POCT Blood Glucose.: 294 mg/dL (07 Apr 2025 21:49)  POCT Blood Glucose.: 162 mg/dL (07 Apr 2025 17:38)  POCT Blood Glucose.: 99 mg/dL (07 Apr 2025 12:53)      Anion Gap: 20 *H* (04-08 @ 07:21)  Anion Gap: 21 *H* (04-07 @ 21:14)  Anion Gap: 17 (04-07 @ 07:07)      Calcium: 9.8 (04-08 @ 07:21)  Calcium: 9.9 (04-07 @ 21:14)  Calcium: 9.6 (04-07 @ 07:07)          04-08    133[L]  |  90[L]  |  76[H]  ----------------------------<  214[H]  3.8   |  23  |  2.71[H]    Ca    9.8      08 Apr 2025 07:21  Mg     2.8     04-08      Medications  MEDICATIONS  (STANDING):  AQUAPHOR (petrolatum Ointment) 1 Application(s) Topical two times a day  aspirin  chewable 81 milliGRAM(s) Oral daily  buMETAnide Infusion 2 mG/Hr (10 mL/Hr) IV Continuous <Continuous>  dextrose 5%. 1000 milliLiter(s) (50 mL/Hr) IV Continuous <Continuous>  dextrose 5%. 1000 milliLiter(s) (100 mL/Hr) IV Continuous <Continuous>  dextrose 50% Injectable 25 Gram(s) IV Push once  dextrose 50% Injectable 12.5 Gram(s) IV Push once  dextrose 50% Injectable 25 Gram(s) IV Push once  glucagon  Injectable 1 milliGRAM(s) IntraMuscular once  hydrALAZINE 50 milliGRAM(s) Oral three times a day  insulin glargine Injectable (LANTUS) 15 Unit(s) SubCutaneous at bedtime  insulin lispro (ADMELOG) corrective regimen sliding scale   SubCutaneous at bedtime  insulin lispro (ADMELOG) corrective regimen sliding scale   SubCutaneous three times a day before meals  insulin lispro Injectable (ADMELOG) 9 Unit(s) SubCutaneous three times a day before meals  isosorbide   dinitrate Tablet (ISORDIL) 30 milliGRAM(s) Oral three times a day  metoprolol succinate ER 75 milliGRAM(s) Oral daily  Nephro-daniel 1 Tablet(s) Oral daily  nitroglycerin     SubLingual 0.4 milliGRAM(s) SubLingual once  ranolazine 500 milliGRAM(s) Oral two times a day  rivaroxaban 15 milliGRAM(s) Oral with dinner  spironolactone 25 milliGRAM(s) Oral two times a day      Physical Exam  General: Patient comfortable in bed   Vital Signs Last 12 Hrs  T(F): 97.6 (04-08-25 @ 04:15), Max: 97.6 (04-08-25 @ 04:15)  HR: 81 (04-08-25 @ 04:15) (81 - 81)  BP: 146/76 (04-08-25 @ 04:15) (146/76 - 146/76)  BP(mean): --  RR: 18 (04-08-25 @ 04:15) (18 - 18)  SpO2: 95% (04-08-25 @ 04:15) (95% - 95%)    CVS: S1S2   Respiratory: No wheezing, no crepitations  GI: Abdomen soft, bowel sounds positive  Musculoskeletal:  moves all extremities  : Voiding        Chief complaint  Patient is a 70y old  Male who presents with a chief complaint of CHF (06 Apr 2025 11:30)         Labs and Fingersticks  CAPILLARY BLOOD GLUCOSE      POCT Blood Glucose.: 169 mg/dL (08 Apr 2025 08:52)  POCT Blood Glucose.: 294 mg/dL (07 Apr 2025 21:49)  POCT Blood Glucose.: 162 mg/dL (07 Apr 2025 17:38)  POCT Blood Glucose.: 99 mg/dL (07 Apr 2025 12:53)      Anion Gap: 20 *H* (04-08 @ 07:21)  Anion Gap: 21 *H* (04-07 @ 21:14)  Anion Gap: 17 (04-07 @ 07:07)      Calcium: 9.8 (04-08 @ 07:21)  Calcium: 9.9 (04-07 @ 21:14)  Calcium: 9.6 (04-07 @ 07:07)          04-08    133[L]  |  90[L]  |  76[H]  ----------------------------<  214[H]  3.8   |  23  |  2.71[H]    Ca    9.8      08 Apr 2025 07:21  Mg     2.8     04-08      Medications  MEDICATIONS  (STANDING):  AQUAPHOR (petrolatum Ointment) 1 Application(s) Topical two times a day  aspirin  chewable 81 milliGRAM(s) Oral daily  buMETAnide Infusion 2 mG/Hr (10 mL/Hr) IV Continuous <Continuous>  dextrose 5%. 1000 milliLiter(s) (50 mL/Hr) IV Continuous <Continuous>  dextrose 5%. 1000 milliLiter(s) (100 mL/Hr) IV Continuous <Continuous>  dextrose 50% Injectable 25 Gram(s) IV Push once  dextrose 50% Injectable 12.5 Gram(s) IV Push once  dextrose 50% Injectable 25 Gram(s) IV Push once  glucagon  Injectable 1 milliGRAM(s) IntraMuscular once  hydrALAZINE 50 milliGRAM(s) Oral three times a day  insulin glargine Injectable (LANTUS) 15 Unit(s) SubCutaneous at bedtime  insulin lispro (ADMELOG) corrective regimen sliding scale   SubCutaneous at bedtime  insulin lispro (ADMELOG) corrective regimen sliding scale   SubCutaneous three times a day before meals  insulin lispro Injectable (ADMELOG) 9 Unit(s) SubCutaneous three times a day before meals  isosorbide   dinitrate Tablet (ISORDIL) 30 milliGRAM(s) Oral three times a day  metoprolol succinate ER 75 milliGRAM(s) Oral daily  Nephro-daniel 1 Tablet(s) Oral daily  nitroglycerin     SubLingual 0.4 milliGRAM(s) SubLingual once  ranolazine 500 milliGRAM(s) Oral two times a day  rivaroxaban 15 milliGRAM(s) Oral with dinner  spironolactone 25 milliGRAM(s) Oral two times a day      Physical Exam  General: Patient comfortable in bed   Vital Signs Last 12 Hrs  T(F): 97.6 (04-08-25 @ 04:15), Max: 97.6 (04-08-25 @ 04:15)  HR: 81 (04-08-25 @ 04:15) (81 - 81)  BP: 146/76 (04-08-25 @ 04:15) (146/76 - 146/76)  BP(mean): --  RR: 18 (04-08-25 @ 04:15) (18 - 18)  SpO2: 95% (04-08-25 @ 04:15) (95% - 95%)    CVS: S1S2   Respiratory: No wheezing, no crepitations  GI: Abdomen soft, bowel sounds positive  Musculoskeletal:  moves all extremities  : Voiding

## 2025-04-08 NOTE — PROGRESS NOTE ADULT - ASSESSMENT
#h/o ashd s/p multiple pci, last pci dLAD 4/17/24 by Dr ILDA Dominguez, known severe inoperable CAD, not amenable to intervention.  #HTN  #DM (Dr Reyes)  #ckd (Dr Christophe Walton)  #s/p cea by Dr Calle  # hfref exacerbation, now with MEMS,  noted  depressed LV function and CO, with severe pulm HTN and fluid overload ( RA 26  PCWP 30)  #AF , remains on xarelto.   #s/p Left foot infection   #s/p RSV   Continue diuresis.   Heart failure recommendations noted: increase bumex gtt to 2 mg/hour, increase hydralazine 50 tid  Close follow up renal function and electrolytes. Please follow MEMs carefully.   Slow improvement.

## 2025-04-08 NOTE — PROGRESS NOTE ADULT - PROBLEM SELECTOR PLAN 1
Will continue current insulin regimen for now.   Patient counseled for compliance with consistent low carb diet and physical activity as tolerated.    Can be discharged on current basal bolus insulins  He may use his home insulin brands  FU outpt for further management

## 2025-04-08 NOTE — PROGRESS NOTE ADULT - SUBJECTIVE AND OBJECTIVE BOX
Date of service: 25 @ 13:42      Patient is a 70y old  Male who presents with a chief complaint of CHF (2025 11:30)                                                               INTERVAL HPI/OVERNIGHT EVENTS:    REVIEW OF SYSTEMS:     CONSTITUTIONAL: No weakness, fevers or chills  EYES/ENT: No visual changes , no ear ache   NECK: No pain or stiffness  RESPIRATORY: No cough, wheezing,  No shortness of breath  CARDIOVASCULAR: No chest pain or palpitations  GASTROINTESTINAL: No abdominal pain  . No nausea, vomiting, or hematemesis; No diarrhea or constipation. No melena or hematochezia.  GENITOURINARY: No dysuria, frequency or hematuria  NEUROLOGICAL: No numbness or weakness  SKIN: No itching, burning, rashes, or lesions                                                                                                                                                                                                                                                                                 Medications:  MEDICATIONS  (STANDING):  AQUAPHOR (petrolatum Ointment) 1 Application(s) Topical two times a day  aspirin  chewable 81 milliGRAM(s) Oral daily  buMETAnide Infusion 2 mG/Hr (10 mL/Hr) IV Continuous <Continuous>  dextrose 5%. 1000 milliLiter(s) (50 mL/Hr) IV Continuous <Continuous>  dextrose 5%. 1000 milliLiter(s) (100 mL/Hr) IV Continuous <Continuous>  dextrose 50% Injectable 25 Gram(s) IV Push once  dextrose 50% Injectable 12.5 Gram(s) IV Push once  dextrose 50% Injectable 25 Gram(s) IV Push once  glucagon  Injectable 1 milliGRAM(s) IntraMuscular once  hydrALAZINE 75 milliGRAM(s) Oral three times a day  insulin glargine Injectable (LANTUS) 15 Unit(s) SubCutaneous at bedtime  insulin lispro (ADMELOG) corrective regimen sliding scale   SubCutaneous at bedtime  insulin lispro (ADMELOG) corrective regimen sliding scale   SubCutaneous three times a day before meals  insulin lispro Injectable (ADMELOG) 9 Unit(s) SubCutaneous three times a day before meals  isosorbide   dinitrate Tablet (ISORDIL) 30 milliGRAM(s) Oral three times a day  metoprolol succinate ER 75 milliGRAM(s) Oral daily  Nephro-daniel 1 Tablet(s) Oral daily  nitroglycerin     SubLingual 0.4 milliGRAM(s) SubLingual once  ranolazine 500 milliGRAM(s) Oral two times a day  rivaroxaban 15 milliGRAM(s) Oral with dinner  sodium chloride 3% Bolus 150 milliLiter(s) IV Bolus <User Schedule>  spironolactone 25 milliGRAM(s) Oral two times a day    MEDICATIONS  (PRN):  acetaminophen     Tablet .. 650 milliGRAM(s) Oral every 6 hours PRN Temp greater or equal to 38C (100.4F), Mild Pain (1 - 3)  aluminum hydroxide/magnesium hydroxide/simethicone Suspension 30 milliLiter(s) Oral every 4 hours PRN Dyspepsia  dextrose Oral Gel 15 Gram(s) Oral once PRN Blood Glucose LESS THAN 70 milliGRAM(s)/deciliter  melatonin 3 milliGRAM(s) Oral at bedtime PRN Insomnia  ondansetron Injectable 4 milliGRAM(s) IV Push every 8 hours PRN Nausea and/or Vomiting       Allergies    atorvastatin (Muscle Pain (Severe))    Intolerances      Vital Signs Last 24 Hrs  T(C): 36.3 (2025 11:58), Max: 36.6 (2025 20:25)  T(F): 97.3 (2025 11:58), Max: 97.9 (2025 20:25)  HR: 76 (2025 11:58) (70 - 81)  BP: 145/72 (2025 11:58) (145/67 - 165/71)  BP(mean): --  RR: 18 (2025 11:58) (18 - 18)  SpO2: 94% (2025 11:58) (94% - 99%)    Parameters below as of 2025 11:58  Patient On (Oxygen Delivery Method): room air      CAPILLARY BLOOD GLUCOSE      POCT Blood Glucose.: 158 mg/dL (2025 12:42)  POCT Blood Glucose.: 169 mg/dL (2025 08:52)  POCT Blood Glucose.: 294 mg/dL (2025 21:49)  POCT Blood Glucose.: 162 mg/dL (2025 17:38)       @ 07:  -   @ 07:00  --------------------------------------------------------  IN: 300 mL / OUT: 2550 mL / NET: -2250 mL     @ 07:01  -   @ 13:42  --------------------------------------------------------  IN: 240 mL / OUT: 300 mL / NET: -60 mL      Physical Exam:    Daily     Daily Weight in k.4 (2025 07:56)  General:  Well appearing, NAD, not cachetic  HEENT:  Nonicteric, PERRLA  CV:  RRR, S1S2   Lungs:  CTA B/L, no wheezes, rales, rhonchi  Abdomen:  Soft, non-tender, no distended, positive BS  Extremities:  2+ pulses, no c/c, no edema  Skin:  Warm and dry, no rashes  :  No moreno  Neuro:  AAOx3, non-focal, grossly intact                                                                                                                                                                                                                                                                                                LABS:                                133[L]  |  90[L]  |  76[H]  ----------------------------<  214[H]  3.8   |  23  |  2.71[H]    Ca    9.8      2025 07:21  Mg     2.8                              RADIOLOGY & ADDITIONAL TESTS         I personally reviewed: [  ]EKG   [  ]CXR    [  ] CT      A/P:         Discussed with :     Derek consultants' Notes   Time spent :

## 2025-04-08 NOTE — PROGRESS NOTE ADULT - SUBJECTIVE AND OBJECTIVE BOX
NEPHROLOGY     Patient seen and examined sitting on bed, comfortable on room air, reports feeling better, no new complaints, in no acute distress.     MEDICATIONS  (STANDING):  AQUAPHOR (petrolatum Ointment) 1 Application(s) Topical two times a day  aspirin  chewable 81 milliGRAM(s) Oral daily  buMETAnide Infusion 2 mG/Hr (10 mL/Hr) IV Continuous <Continuous>  dextrose 5%. 1000 milliLiter(s) (50 mL/Hr) IV Continuous <Continuous>  dextrose 5%. 1000 milliLiter(s) (100 mL/Hr) IV Continuous <Continuous>  dextrose 50% Injectable 25 Gram(s) IV Push once  dextrose 50% Injectable 12.5 Gram(s) IV Push once  dextrose 50% Injectable 25 Gram(s) IV Push once  glucagon  Injectable 1 milliGRAM(s) IntraMuscular once  hydrALAZINE 75 milliGRAM(s) Oral three times a day  insulin glargine Injectable (LANTUS) 15 Unit(s) SubCutaneous at bedtime  insulin lispro (ADMELOG) corrective regimen sliding scale   SubCutaneous at bedtime  insulin lispro (ADMELOG) corrective regimen sliding scale   SubCutaneous three times a day before meals  insulin lispro Injectable (ADMELOG) 9 Unit(s) SubCutaneous three times a day before meals  isosorbide   dinitrate Tablet (ISORDIL) 30 milliGRAM(s) Oral three times a day  metoprolol succinate ER 75 milliGRAM(s) Oral daily  Nephro-daniel 1 Tablet(s) Oral daily  nitroglycerin     SubLingual 0.4 milliGRAM(s) SubLingual once  ranolazine 500 milliGRAM(s) Oral two times a day  rivaroxaban 15 milliGRAM(s) Oral with dinner  sodium chloride 3% Bolus 150 milliLiter(s) IV Bolus <User Schedule>  spironolactone 25 milliGRAM(s) Oral two times a day    VITALS:  T(C): , Max: 36.6 (04-07-25 @ 20:25)  T(F): , Max: 97.9 (04-07-25 @ 20:25)  HR: 76 (04-08-25 @ 11:58)  BP: 145/72 (04-08-25 @ 11:58)  RR: 18 (04-08-25 @ 11:58)  SpO2: 94% (04-08-25 @ 11:58)    I and O's:    04-07 @ 07:01  -  04-08 @ 07:00  --------------------------------------------------------  IN: 300 mL / OUT: 2550 mL / NET: -2250 mL    PHYSICAL EXAM:  Constitutional: NAD, Alert  HEENT: NCAT, MMM  Neck: Supple, No JVD  Respiratory: CTA-b/l  Cardiovascular: RRR s1s2, no m/r/g  Gastrointestinal: BS+, soft, NT/ND  Extremities: 1-2+ b/l LE edema  Neurological: no focal deficits; strength grossly intact  Back: no CVAT b/l  Skin: No rashes, no nevi    LABS:    04-08    133[L]  |  90[L]  |  76[H]  ----------------------------<  214[H]  3.8   |  23  |  2.71[H]    Ca    9.8      08 Apr 2025 07:21  Mg     2.8     04-08

## 2025-04-08 NOTE — PROGRESS NOTE ADULT - ASSESSMENT
IMPRESSION: 70M w/ HTN, DM2, CAD, HFrEF, and CKD4, 3/26/25 p/w ADHF    (1)CKD - 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 - numbers fluctuating based on hemodynamics  (2)Lytes - accetpable  (3)CV - acute on chronic HFrEF - improved relative to admission, with Bumex gtt    RECOMMEND:  (1)Can advance to high-dose PO diuretics - Lasix 80mg po bid + Spironolactone 50mg po bid?  (2)Dose new meds for GFR 20-25ml/min  (3)BMP+Mg at least daily while admitted    Delilah Foy, [facility]  [phone]        IMPRESSION: 70M w/ HTN, DM2, CAD, HFrEF, and CKD4, 3/26/25 p/w ADHF    (1)CKD - 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 - numbers fluctuating based on hemodynamics  (2)Lytes - accetpable  (3)CV - acute on chronic HFrEF - improved relative to admission, with Bumex gtt    RECOMMEND:  (1)Can advance to high-dose PO diuretics - Lasix 80mg po bid + Spironolactone 50mg po bid?  (2)Dose new meds for GFR 20-25ml/min  (3)BMP+Mg at least daily while admitted    Delilah Foy DNP  [facility]  [phone]       RENAL ATTENDING NOTE  Patient seen and examined with NP. Agree with assessment and plan as above.    Markell Walton MD  [facility]  [phone]

## 2025-04-08 NOTE — PROGRESS NOTE ADULT - SUBJECTIVE AND OBJECTIVE BOX
ADVANCED HEART FAILURE & TRANSPLANT  - PROGRESS NOTE  *To reach the NS1 Team from 8am to 5pm, please call [phone].    ___________________________________________________________________________  Subjective:  - Sitting at the side of the bed. Walking short distances in the room without SOB  - Denies orthopnea, lightheadedness    Medications:  acetaminophen     Tablet .. 650 milliGRAM(s) Oral every 6 hours PRN  aluminum hydroxide/magnesium hydroxide/simethicone Suspension 30 milliLiter(s) Oral every 4 hours PRN  AQUAPHOR (petrolatum Ointment) 1 Application(s) Topical two times a day  aspirin  chewable 81 milliGRAM(s) Oral daily  buMETAnide Infusion 2 mG/Hr IV Continuous <Continuous>  dextrose 5%. 1000 milliLiter(s) IV Continuous <Continuous>  dextrose 5%. 1000 milliLiter(s) IV Continuous <Continuous>  dextrose 50% Injectable 25 Gram(s) IV Push once  dextrose 50% Injectable 12.5 Gram(s) IV Push once  dextrose 50% Injectable 25 Gram(s) IV Push once  dextrose Oral Gel 15 Gram(s) Oral once PRN  glucagon  Injectable 1 milliGRAM(s) IntraMuscular once  hydrALAZINE 50 milliGRAM(s) Oral three times a day  insulin glargine Injectable (LANTUS) 15 Unit(s) SubCutaneous at bedtime  insulin lispro (ADMELOG) corrective regimen sliding scale   SubCutaneous at bedtime  insulin lispro (ADMELOG) corrective regimen sliding scale   SubCutaneous three times a day before meals  insulin lispro Injectable (ADMELOG) 9 Unit(s) SubCutaneous three times a day before meals  isosorbide   dinitrate Tablet (ISORDIL) 30 milliGRAM(s) Oral three times a day  melatonin 3 milliGRAM(s) Oral at bedtime PRN  metoprolol succinate ER 75 milliGRAM(s) Oral daily  Nephro-daniel 1 Tablet(s) Oral daily  nitroglycerin     SubLingual 0.4 milliGRAM(s) SubLingual once  ondansetron Injectable 4 milliGRAM(s) IV Push every 8 hours PRN  ranolazine 500 milliGRAM(s) Oral two times a day  rivaroxaban 15 milliGRAM(s) Oral with dinner  spironolactone 25 milliGRAM(s) Oral two times a day      Physical Exam:    Vitals:  Vital Signs Last 24 Hours  T(C): 36.3 (25 @ 11:58), Max: 36.6 (25 @ 20:25)  HR: 76 (25 @ 11:58) (70 - 81)  BP: 145/72 (25 @ 11:58) (145/67 - 165/71)  RR: 18 (25 @ 11:58) (18 - 18)  SpO2: 94% (25 @ 11:58) (94% - 99%)    Weight in k.4 ( @ 07:56)    I&O's Summary    2025 07:  -  2025 07:00  --------------------------------------------------------  IN: 300 mL / OUT: 2550 mL / NET: -2250 mL    2025 07:  -  2025 13:09  --------------------------------------------------------  IN: 240 mL / OUT: 300 mL / NET: -60 mL    Tele: afib    General: No distress. Comfortable.  HEENT: EOM intact.  Neck: Neck supple. JVP moderately elevated, ~16cm H2O, improved from yesterday. No masses  Chest: Clear to auscultation bilaterally  CV: Irregularly irregular. Normal S1 and S2. No murmurs, rub, or gallops. Trace tense BLE edema  Abdomen: Soft, non-distended, non-tender  Skin: No rashes or skin breakdown  Neurology: Alert and oriented times three. Sensation intact  Psych: Affect normal    Labs:    04-08    133[L]  |  90[L]  |  76[H]  ----------------------------<  214[H]  3.8   |  23  |  2.71[H]    Ca    9.8      2025 07:21  Mg     2.8     0408

## 2025-04-09 NOTE — PROGRESS NOTE ADULT - SUBJECTIVE AND OBJECTIVE BOX
Chief complaint    Patient is a 70y old  Male who presents with a chief complaint of CHF (06 Apr 2025 11:30)   Review of systems  Patient appears comfortable.    Labs and Fingersticks  CAPILLARY BLOOD GLUCOSE      POCT Blood Glucose.: 94 mg/dL (09 Apr 2025 08:52)  POCT Blood Glucose.: 72 mg/dL (09 Apr 2025 08:40)  POCT Blood Glucose.: 79 mg/dL (09 Apr 2025 08:38)  POCT Blood Glucose.: 70 mg/dL (08 Apr 2025 21:22)  POCT Blood Glucose.: 73 mg/dL (08 Apr 2025 17:18)  POCT Blood Glucose.: 158 mg/dL (08 Apr 2025 12:42)      Anion Gap: 17 (04-09 @ 07:13)  Anion Gap: 20 *H* (04-08 @ 07:21)  Anion Gap: 21 *H* (04-07 @ 21:14)      Calcium: 9.9 (04-09 @ 07:13)  Calcium: 9.8 (04-08 @ 07:21)  Calcium: 9.9 (04-07 @ 21:14)          04-09    133[L]  |  93[L]  |  77[H]  ----------------------------<  47[LL]  3.9   |  23  |  2.79[H]    Ca    9.9      09 Apr 2025 07:13  Mg     2.8     04-08      Medications  MEDICATIONS  (STANDING):  AQUAPHOR (petrolatum Ointment) 1 Application(s) Topical two times a day  aspirin  chewable 81 milliGRAM(s) Oral daily  buMETAnide Infusion 2 mG/Hr (10 mL/Hr) IV Continuous <Continuous>  dextrose 5%. 1000 milliLiter(s) (50 mL/Hr) IV Continuous <Continuous>  dextrose 5%. 1000 milliLiter(s) (100 mL/Hr) IV Continuous <Continuous>  dextrose 50% Injectable 25 Gram(s) IV Push once  dextrose 50% Injectable 12.5 Gram(s) IV Push once  dextrose 50% Injectable 25 Gram(s) IV Push once  glucagon  Injectable 1 milliGRAM(s) IntraMuscular once  hydrALAZINE 75 milliGRAM(s) Oral three times a day  insulin glargine Injectable (LANTUS) 9 Unit(s) SubCutaneous at bedtime  insulin lispro (ADMELOG) corrective regimen sliding scale   SubCutaneous three times a day before meals  insulin lispro (ADMELOG) corrective regimen sliding scale   SubCutaneous at bedtime  insulin lispro Injectable (ADMELOG) 6 Unit(s) SubCutaneous three times a day before meals  isosorbide   dinitrate Tablet (ISORDIL) 30 milliGRAM(s) Oral three times a day  metoprolol succinate ER 75 milliGRAM(s) Oral daily  Nephro-daniel 1 Tablet(s) Oral daily  ranolazine 500 milliGRAM(s) Oral two times a day  rivaroxaban 15 milliGRAM(s) Oral with dinner  spironolactone 25 milliGRAM(s) Oral two times a day      Physical Exam  General: Patient appears comfortable.  Vital Signs Last 12 Hrs  T(F): 97.5 (04-09-25 @ 04:10), Max: 97.5 (04-09-25 @ 04:10)  HR: 71 (04-09-25 @ 04:10) (71 - 71)  BP: 146/71 (04-09-25 @ 06:52) (121/69 - 146/71)  BP(mean): --  RR: 18 (04-09-25 @ 04:10) (18 - 18)  SpO2: 95% (04-09-25 @ 04:10) (95% - 95%)  Neck: No palpable thyroid nodules.  CVS: S1S2, No murmurs  Respiratory: No wheezing, no crepitations  GI: Abdomen soft, non tender.    Diagnostics        Radiology:

## 2025-04-09 NOTE — PROGRESS NOTE ADULT - ASSESSMENT
Outpatient cardiologist: Dr. Alfred Moon     Mr Velázquez is a 70 M, with hx of HFmrEF, and PMH of CAD (hx of multiple stents, occluded 100% dLAD not amenable to PCI), Afib (on xarelto), T2DM, CKD4, HTN, known PAD s/p recent LLE with PT and peroneal stent placement (1/23), is here for SOB and BLE for ADHF and also for L foot wound. Patient has recent admission 1/31 to 2/7 for ADHF.     He's been receiving IV diuretics for volume overload. He underwent RHC and cardiomems on 4/4 which showed significantly elevated filling pressures and low output for which he was put on a bumex gtt and his hydralazine dose was increased for further afterload reduction. His weight is now downtrending slowly with high dose diuretics and hypertonic saline. He remains volume overloaded with elevated JVP and elevated cardiomems PAD of 36 today 4/9 from 39, 4/8. He's hypertensive, which is contributing to elevated PAD, with room for further escalation of afterload reduction. Renal function stable. Episodes of angina, with recent LHC showing occluded dLAD.    Cardiac Studies:  4/4/25 RHC/cardiomems (formal report pending) RA 28, RV 85/6, PA 78/32 (47), PCWP 30, CO/CI: 2.72/1.67    [facility] 2/4/25: occluded dLAD, D1 100% stenosis  -1/29/24: patient prior stents in the LM, LAD and Cx, severe disease dLAD in a smaller caliber sized vessel unchanged from prior [facility] that is not amenable to PCI   TTE 1/20/25: LVIDd 3.7cm, LVEF 40-45%, RV nml size, borderline reduced RV function, TAPSE 1.1 cm, mildly dilated LA, dilated RA, mild/mod MR, mild TR, PASP 49, IVC nml  -TTE 6/24/24: LVIDd 3.8cm, LVEF 45-50%, nml RV size/function, nml atria, mild MR, mild TR,

## 2025-04-09 NOTE — PROGRESS NOTE ADULT - ASSESSMENT
IMPRESSION: 70M w/ HTN, DM2, CAD, HFrEF, and CKD4, 3/26/25 p/w ADHF    (1)CKD - 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 - numbers fluctuating based on hemodynamics  (2)Lytes - acceptable   (3)CV - acute on chronic HFrEF - improved relative to admission, with Bumex gtt    RECOMMEND:  (1)Can advance to high-dose PO diuretics - Lasix 80mg po bid + Spironolactone 50mg po bid?  (2)Dose new meds for GFR 20-25ml/min  (3)BMP+Mg at least daily while admitted    Delilah Foy, GLORIA  [facility]  [phone]            IMPRESSION: 70M w/ HTN, DM2, CAD, HFrEF, and CKD4, 3/26/25 p/w ADHF    (1)CKD - 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 - numbers fluctuating based on hemodynamics  (2)Lytes - acceptable   (3)CV - acute on chronic HFrEF - improved relative to admission, with Bumex gtt    RECOMMEND:  (1)Can advance to high-dose PO diuretics - Lasix 80mg po bid + Spironolactone 50mg po bid?  (2)Dose new meds for GFR 20-25ml/min  (3)BMP+Mg at least daily while admitted    Delilah Foy DNP  [facility]  [phone]       RENAL ATTENDING NOTE  Patient seen and examined with NP. Agree with assessment and plan as above.    Markell Walton MD  [facility]  [phone]

## 2025-04-09 NOTE — PROGRESS NOTE ADULT - SUBJECTIVE AND OBJECTIVE BOX
Complaining of chest pain this AM   Dyspnea is not improved on increased Bumex  Cardio Mems was checked this AM  /71  HR 71  Lungs clear  Irregular rhythm  Trace edema    BUN 77  Crt 2.79    Imp:  Inoperable severe CAD - Would give SL NTG now and increase the Ranexa to 1000 mg bid  Change Metoprolol to 50 mg bid  CHF - On Hydralazine 75 mg tid  Isordil 30 mg tid  Bumex @ 2 mg/hr  Spironolactone 25 mg bid  Metoprolol ER 75 mg qd  Check Cardio Mems results

## 2025-04-09 NOTE — PROGRESS NOTE ADULT - PROBLEM SELECTOR PLAN 1
-Etiology: likely ischemic  -GDMT: Increase hydralazine to 100mg TID, hold for SBP < 90    - Continue spironolactone to 25mg BID, ISDN 30mg TID, Toprol XL 75mg daily.  -Eventual ARB/ARNi if renal function permits  -Will hold off on SGLT2-i for now given PAD with ulcers and fluctuating creatinine   -Diuretics: Recommend hypertonic saline 3% 150ml/30 min [id number] x 3 doses and would continue bumex gtt to 2mg/hour with close monitoring of electrolytes.   -Will check cardiomems readings intermittently. Goal to be established once optimized.  -Strict I/Os and daily weights  -Keep K>4 and Mg>2  - PT

## 2025-04-09 NOTE — PROGRESS NOTE ADULT - SUBJECTIVE AND OBJECTIVE BOX
NEPHROLOGY     Patient seen and examined sitting on bed reports feeling tired, noted with chest pain earlier this morning, no sob, comfortable on room air, in no acute distress.     MEDICATIONS  (STANDING):  AQUAPHOR (petrolatum Ointment) 1 Application(s) Topical two times a day  aspirin  chewable 81 milliGRAM(s) Oral daily  buMETAnide Infusion 2 mG/Hr (10 mL/Hr) IV Continuous <Continuous>  dextrose 5%. 1000 milliLiter(s) (50 mL/Hr) IV Continuous <Continuous>  dextrose 5%. 1000 milliLiter(s) (100 mL/Hr) IV Continuous <Continuous>  dextrose 50% Injectable 25 Gram(s) IV Push once  dextrose 50% Injectable 12.5 Gram(s) IV Push once  dextrose 50% Injectable 25 Gram(s) IV Push once  glucagon  Injectable 1 milliGRAM(s) IntraMuscular once  hydrALAZINE 100 milliGRAM(s) Oral three times a day  insulin glargine Injectable (LANTUS) 9 Unit(s) SubCutaneous at bedtime  insulin lispro (ADMELOG) corrective regimen sliding scale   SubCutaneous at bedtime  insulin lispro (ADMELOG) corrective regimen sliding scale   SubCutaneous three times a day before meals  insulin lispro Injectable (ADMELOG) 6 Unit(s) SubCutaneous three times a day before meals  isosorbide   dinitrate Tablet (ISORDIL) 30 milliGRAM(s) Oral three times a day  metoprolol succinate ER 75 milliGRAM(s) Oral daily  Nephro-daniel 1 Tablet(s) Oral daily  potassium chloride    Tablet ER 20 milliEquivalent(s) Oral every 2 hours  ranolazine 1000 milliGRAM(s) Oral two times a day  rivaroxaban 15 milliGRAM(s) Oral with dinner  sodium chloride 3% Bolus 150 milliLiter(s) IV Bolus <User Schedule>  spironolactone 25 milliGRAM(s) Oral two times a day    VITALS:  T(C): , Max: 36.5 (04-09-25 @ 11:49)  T(F): , Max: 97.7 (04-09-25 @ 11:49)  HR: 75 (04-09-25 @ 11:49)  BP: 104/83 (04-09-25 @ 11:49)  RR: 18 (04-09-25 @ 11:49)  SpO2: 97% (04-09-25 @ 11:49)    I and O's:    04-08 @ 07:01  -  04-09 @ 07:00  --------------------------------------------------------  IN: 1130 mL / OUT: 3140 mL / NET: -2010 mL    PHYSICAL EXAM:  Constitutional: NAD, Alert  HEENT: NCAT, MMM  Neck: Supple, No JVD  Respiratory: CTA-b/l  Cardiovascular: RRR s1s2, no m/r/g  Gastrointestinal: BS+, soft, NT/ND  Extremities: 1+ b/l LE edema  Neurological: no focal deficits; strength grossly intact  Back: no CVAT b/l  Skin: No rashes, no nevi    LABS:    04-09    133[L]  |  93[L]  |  77[H]  ----------------------------<  47[LL]  3.9   |  23  |  2.79[H]    Ca    9.9      09 Apr 2025 07:13  Mg     2.8     04-08

## 2025-04-09 NOTE — PROGRESS NOTE ADULT - PROBLEM SELECTOR PLAN 1
Will decrease Lantus to 9 units at bed time.  Will decrease Admelog to 6  units before each meal in addition to Admelog correction scale coverage.  Patient counseled for compliance with consistent low carb diet and physical activity as tolerated.  .     Can be discharged on current basal bolus insulins  He may use his home insulin brands  FU outpt for further management

## 2025-04-09 NOTE — PROGRESS NOTE ADULT - SUBJECTIVE AND OBJECTIVE BOX
Date of service: 25 @ 23:21      Patient is a 70y old  Male who presents with a chief complaint of CHF (2025 10:18)                                                               INTERVAL HPI/OVERNIGHT EVENTS:    REVIEW OF SYSTEMS:     CONSTITUTIONAL: No weakness, fevers or chills  EYES/ENT: No visual changes , no ear ache   NECK: No pain or stiffness  RESPIRATORY: No cough, wheezing,  No shortness of breath  CARDIOVASCULAR: No chest pain or palpitations  GASTROINTESTINAL: No abdominal pain  . No nausea, vomiting, or hematemesis; No diarrhea or constipation. No melena or hematochezia.  GENITOURINARY: No dysuria, frequency or hematuria  NEUROLOGICAL: No numbness or weakness  SKIN: No itching, burning, rashes, or lesions                                                                                                                                                                                                                                                                                 Medications:  MEDICATIONS  (STANDING):  AQUAPHOR (petrolatum Ointment) 1 Application(s) Topical two times a day  aspirin  chewable 81 milliGRAM(s) Oral daily  buMETAnide Infusion 2 mG/Hr (10 mL/Hr) IV Continuous <Continuous>  dextrose 5%. 1000 milliLiter(s) (100 mL/Hr) IV Continuous <Continuous>  dextrose 5%. 1000 milliLiter(s) (50 mL/Hr) IV Continuous <Continuous>  dextrose 50% Injectable 25 Gram(s) IV Push once  dextrose 50% Injectable 12.5 Gram(s) IV Push once  dextrose 50% Injectable 25 Gram(s) IV Push once  glucagon  Injectable 1 milliGRAM(s) IntraMuscular once  hydrALAZINE 100 milliGRAM(s) Oral three times a day  insulin glargine Injectable (LANTUS) 9 Unit(s) SubCutaneous at bedtime  insulin lispro (ADMELOG) corrective regimen sliding scale   SubCutaneous three times a day before meals  insulin lispro (ADMELOG) corrective regimen sliding scale   SubCutaneous at bedtime  insulin lispro Injectable (ADMELOG) 6 Unit(s) SubCutaneous three times a day before meals  isosorbide   dinitrate Tablet (ISORDIL) 30 milliGRAM(s) Oral three times a day  metoprolol succinate ER 75 milliGRAM(s) Oral daily  Nephro-daniel 1 Tablet(s) Oral daily  ranolazine 1000 milliGRAM(s) Oral two times a day  rivaroxaban 15 milliGRAM(s) Oral with dinner  sodium chloride 3% Bolus 150 milliLiter(s) IV Bolus <User Schedule>  spironolactone 25 milliGRAM(s) Oral two times a day    MEDICATIONS  (PRN):  acetaminophen     Tablet .. 650 milliGRAM(s) Oral every 6 hours PRN Temp greater or equal to 38C (100.4F), Mild Pain (1 - 3)  aluminum hydroxide/magnesium hydroxide/simethicone Suspension 30 milliLiter(s) Oral every 4 hours PRN Dyspepsia  dextrose Oral Gel 15 Gram(s) Oral once PRN Blood Glucose LESS THAN 70 milliGRAM(s)/deciliter  melatonin 3 milliGRAM(s) Oral at bedtime PRN Insomnia  ondansetron Injectable 4 milliGRAM(s) IV Push every 8 hours PRN Nausea and/or Vomiting       Allergies    atorvastatin (Muscle Pain (Severe))    Intolerances      Vital Signs Last 24 Hrs  T(C): 36.4 (2025 21:23), Max: 36.5 (2025 11:49)  T(F): 97.5 (2025 21:23), Max: 97.7 (2025 11:49)  HR: 77 (2025 21:23) (71 - 92)  BP: 118/72 (2025 21:23) (104/83 - 148/70)  BP(mean): --  RR: 18 (2025 21:23) (18 - 18)  SpO2: 98% (2025 21:23) (95% - 98%)    Parameters below as of 2025 21:23  Patient On (Oxygen Delivery Method): room air      CAPILLARY BLOOD GLUCOSE      POCT Blood Glucose.: 81 mg/dL (2025 21:04)  POCT Blood Glucose.: 374 mg/dL (2025 17:27)  POCT Blood Glucose.: 366 mg/dL (2025 12:40)  POCT Blood Glucose.: 135 mg/dL (2025 09:05)  POCT Blood Glucose.: 94 mg/dL (2025 08:52)  POCT Blood Glucose.: 72 mg/dL (2025 08:40)  POCT Blood Glucose.: 79 mg/dL (2025 08:38)      04-08 @ 07:01  -  04-09 @ 07:00  --------------------------------------------------------  IN: 1130 mL / OUT: 3140 mL / NET: - mL     @ 07:01  -   @ 23:21  --------------------------------------------------------  IN: 860 mL / OUT: 1200 mL / NET: -340 mL      Physical Exam:    Daily     Daily Weight in k.2 (2025 06:52)  General:  Well appearing, NAD, not cachetic  HEENT:  Nonicteric, PERRLA  CV:  RRR, S1S2   Lungs:  CTA B/L, no wheezes, rales, rhonchi  Abdomen:  Soft, non-tender, no distended, positive BS  Extremities:edema   Neuro:  AAOx3, non-focal, grossly intact                                                                                                                                                                                                                                                                                                LABS:                                128[L]  |  91[L]  |  81[H]  ----------------------------<  286[H]  4.6   |  17[L]  |  2.80[H]    Ca    9.4      2025 19:28  Mg     2.8                              RADIOLOGY & ADDITIONAL TESTS         I personally reviewed: [  ]EKG   [  ]CXR    [  ] CT      A/P:         Discussed with :     Derek consultants' Notes   Time spent :

## 2025-04-09 NOTE — PROGRESS NOTE ADULT - ASSESSMENT
Assessment  DMT2: 70y Male with DM T2 with hyperglycemia admitted with SOB,  patient was on oral hypoglycemic agents at home, now on  insulin coverage, blood sugars are trending down, had hypoglycemia.  Patient is high risk with high level decision making due to uncontrolled diabetes, has increased risk for complications.   CAD: on medications, monitored, no acute events.  HTN: On antihypertensive medications, monitored, stable.    Myrna Rossi MD  Cell: [phone]  Office: [phone]

## 2025-04-10 NOTE — PROGRESS NOTE ADULT - ASSESSMENT
69 y/o WM, with hx of HFmrEF, and PMH of CAD (hx of multiple stents, occluded 100% dLAD not amenable to PCI), Afib (on Xarelto), T2DM, CKD4, HTN, known PAD s/p recent LLE with PT and peroneal stent placement (1/23), is here for SOB and BLE for ADHF and also for L foot wound. 4/4/25 RHC/cardiomems (formal report pending) RA 28, RV 85/6, PA 78/32 (47), PCWP 30, CO/CI: 2.72/1.67  - CardioMEMS placed 4/4/25  - PAD 36 on 4/9 39 on 4/8  - await PAD today  - cont afterload reduction  -  Spironolactone 25 BID, ISDN 30 TID Toprol XL 75 QD BUSTAMANTE 100 TID = GDMT  - Holding off SGLT2 and ACE ARB ARNI given BARRY - consider introduction if OK with Nephrology and HF team   - appreciate Nephrology and HF team follow up   - D/w pt

## 2025-04-10 NOTE — PROGRESS NOTE ADULT - SUBJECTIVE AND OBJECTIVE BOX
Chief complaint  Patient is a 70y old  Male who presents with a chief complaint of HF (10 Apr 2025 11:28)         Labs and Fingersticks  CAPILLARY BLOOD GLUCOSE      POCT Blood Glucose.: 222 mg/dL (10 Apr 2025 17:23)  POCT Blood Glucose.: 141 mg/dL (10 Apr 2025 12:57)  POCT Blood Glucose.: 100 mg/dL (10 Apr 2025 08:56)  POCT Blood Glucose.: 81 mg/dL (09 Apr 2025 21:04)      Anion Gap: 17 (04-10 @ 14:57)  Anion Gap: 20 *H* (04-09 @ 19:28)  Anion Gap: 17 (04-09 @ 07:13)      Calcium: 9.7 (04-10 @ 14:57)  Calcium: 9.4 (04-09 @ 19:28)  Calcium: 9.9 (04-09 @ 07:13)          04-10    130[L]  |  93[L]  |  80[H]  ----------------------------<  134[H]  4.2   |  20[L]  |  2.88[H]    Ca    9.7      10 Apr 2025 14:57  Mg     2.6     04-10      Medications  MEDICATIONS  (STANDING):  AQUAPHOR (petrolatum Ointment) 1 Application(s) Topical two times a day  aspirin  chewable 81 milliGRAM(s) Oral daily  buMETAnide Infusion 2 mG/Hr (10 mL/Hr) IV Continuous <Continuous>  dextrose 5%. 1000 milliLiter(s) (50 mL/Hr) IV Continuous <Continuous>  dextrose 5%. 1000 milliLiter(s) (100 mL/Hr) IV Continuous <Continuous>  dextrose 50% Injectable 25 Gram(s) IV Push once  dextrose 50% Injectable 12.5 Gram(s) IV Push once  dextrose 50% Injectable 25 Gram(s) IV Push once  glucagon  Injectable 1 milliGRAM(s) IntraMuscular once  hydrALAZINE 100 milliGRAM(s) Oral three times a day  insulin glargine Injectable (LANTUS) 9 Unit(s) SubCutaneous at bedtime  insulin lispro (ADMELOG) corrective regimen sliding scale   SubCutaneous three times a day before meals  insulin lispro (ADMELOG) corrective regimen sliding scale   SubCutaneous at bedtime  insulin lispro Injectable (ADMELOG) 6 Unit(s) SubCutaneous three times a day before meals  isosorbide   dinitrate Tablet (ISORDIL) 30 milliGRAM(s) Oral three times a day  metoprolol succinate ER 75 milliGRAM(s) Oral daily  Nephro-daniel 1 Tablet(s) Oral daily  ranolazine 1000 milliGRAM(s) Oral two times a day  rivaroxaban 15 milliGRAM(s) Oral with dinner  sodium chloride 3% Bolus 150 milliLiter(s) IV Bolus <User Schedule>  spironolactone 25 milliGRAM(s) Oral two times a day      Physical Exam  General: Patient comfortable in bed   Vital Signs Last 12 Hrs  T(F): 97.6 (04-10-25 @ 11:58), Max: 97.6 (04-10-25 @ 11:58)  HR: 71 (04-10-25 @ 11:58) (71 - 71)  BP: 125/68 (04-10-25 @ 11:58) (117/60 - 125/68)  BP(mean): --  RR: 18 (04-10-25 @ 11:58) (18 - 18)  SpO2: 96% (04-10-25 @ 11:58) (96% - 96%)    CVS: S1S2   Respiratory: No wheezing, no crepitations  GI: Abdomen soft, bowel sounds positive  Musculoskeletal:  moves all extremities  : Voiding        Chief complaint  Patient is a 70y old  Male who presents with a chief complaint of HF (10 Apr 2025 11:28)         Labs and Fingersticks  CAPILLARY BLOOD GLUCOSE      POCT Blood Glucose.: 222 mg/dL (10 Apr 2025 17:23)  POCT Blood Glucose.: 141 mg/dL (10 Apr 2025 12:57)  POCT Blood Glucose.: 100 mg/dL (10 Apr 2025 08:56)  POCT Blood Glucose.: 81 mg/dL (09 Apr 2025 21:04)      Anion Gap: 17 (04-10 @ 14:57)  Anion Gap: 20 *H* (04-09 @ 19:28)  Anion Gap: 17 (04-09 @ 07:13)      Calcium: 9.7 (04-10 @ 14:57)  Calcium: 9.4 (04-09 @ 19:28)  Calcium: 9.9 (04-09 @ 07:13)          04-10    130[L]  |  93[L]  |  80[H]  ----------------------------<  134[H]  4.2   |  20[L]  |  2.88[H]    Ca    9.7      10 Apr 2025 14:57  Mg     2.6     04-10      Medications  MEDICATIONS  (STANDING):  AQUAPHOR (petrolatum Ointment) 1 Application(s) Topical two times a day  aspirin  chewable 81 milliGRAM(s) Oral daily  buMETAnide Infusion 2 mG/Hr (10 mL/Hr) IV Continuous <Continuous>  dextrose 5%. 1000 milliLiter(s) (50 mL/Hr) IV Continuous <Continuous>  dextrose 5%. 1000 milliLiter(s) (100 mL/Hr) IV Continuous <Continuous>  dextrose 50% Injectable 25 Gram(s) IV Push once  dextrose 50% Injectable 12.5 Gram(s) IV Push once  dextrose 50% Injectable 25 Gram(s) IV Push once  glucagon  Injectable 1 milliGRAM(s) IntraMuscular once  hydrALAZINE 100 milliGRAM(s) Oral three times a day  insulin glargine Injectable (LANTUS) 9 Unit(s) SubCutaneous at bedtime  insulin lispro (ADMELOG) corrective regimen sliding scale   SubCutaneous three times a day before meals  insulin lispro (ADMELOG) corrective regimen sliding scale   SubCutaneous at bedtime  insulin lispro Injectable (ADMELOG) 6 Unit(s) SubCutaneous three times a day before meals  isosorbide   dinitrate Tablet (ISORDIL) 30 milliGRAM(s) Oral three times a day  metoprolol succinate ER 75 milliGRAM(s) Oral daily  Nephro-daniel 1 Tablet(s) Oral daily  ranolazine 1000 milliGRAM(s) Oral two times a day  rivaroxaban 15 milliGRAM(s) Oral with dinner  sodium chloride 3% Bolus 150 milliLiter(s) IV Bolus <User Schedule>  spironolactone 25 milliGRAM(s) Oral two times a day      Physical Exam  General: Patient comfortable in bed   Vital Signs Last 12 Hrs  T(F): 97.6 (04-10-25 @ 11:58), Max: 97.6 (04-10-25 @ 11:58)  HR: 71 (04-10-25 @ 11:58) (71 - 71)  BP: 125/68 (04-10-25 @ 11:58) (117/60 - 125/68)  BP(mean): --  RR: 18 (04-10-25 @ 11:58) (18 - 18)  SpO2: 96% (04-10-25 @ 11:58) (96% - 96%)    CVS: S1S2   Respiratory: No wheezing, no crepitations  GI: Abdomen soft, bowel sounds positive  Musculoskeletal:  moves all extremities  : Voiding

## 2025-04-10 NOTE — PROGRESS NOTE ADULT - ASSESSMENT
Outpatient cardiologist: Dr. Alfred Moon     Mr Velázquez is a 70 M, with hx of HFmrEF, and PMH of CAD (hx of multiple stents, occluded 100% dLAD not amenable to PCI), Afib (on xarelto), T2DM, CKD4, HTN, known PAD s/p recent LLE with PT and peroneal stent placement (1/23), is here for SOB and BLE for ADHF and also for L foot wound. Patient has recent admission 1/31 to 2/7 for ADHF.     He continues to receive IV diuretics for volume overload. He underwent RHC and cardiomems on 4/4 which showed significantly elevated filling pressures and low output for which he was put on a bumex gtt and his hydralazine dose was increased for further afterload reduction. His weight is now uptrending despite high dose diuretics and hypertonic saline. He reports drinking a lot of fluid/eating ice chips. He remains volume overloaded with elevated JVP and elevated cardiomems PAD of 36 today 4/10, unchanged from 4/9. He's now normotensive on increased dosing of vasodialtors. Renal function stable. Episodes of stable angina, improved with escalation of Ranexa.     Cardiomems  4/10 PAD 36  4/9 PAD 36  4/8 PAD 39    Cardiac Studies:  4/4/25 RHC/cardiomems (formal report pending) RA 28, RV 85/6, PA 78/32 (47), PCWP 30, CO/CI: 2.72/1.67    [facility] 2/4/25: occluded dLAD, D1 100% stenosis  -1/29/24: patient prior stents in the LM, LAD and Cx, severe disease dLAD in a smaller caliber sized vessel unchanged from prior [facility] that is not amenable to PCI   TTE 1/20/25: LVIDd 3.7cm, LVEF 40-45%, RV nml size, borderline reduced RV function, TAPSE 1.1 cm, mildly dilated LA, dilated RA, mild/mod MR, mild TR, PASP 49, IVC nml  -TTE 6/24/24: LVIDd 3.8cm, LVEF 45-50%, nml RV size/function, nml atria, mild MR, mild TR,

## 2025-04-10 NOTE — PROGRESS NOTE ADULT - ASSESSMENT
IMPRESSION: 70M w/ HTN, DM2, CAD, HFrEF, and CKD4, 3/26/25 p/w ADHF    (1)CKD - 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 - numbers fluctuating based on hemodynamics  (2)Hyponatremia - as of late - sp hypertonic saline yesterday, corrects to ~131 for glucose  (3)CV - acute on chronic HFrEF - improved relative to admission, with Bumex gtt    RECOMMEND:  (1)Can advance to high-dose PO diuretics - Lasix 80mg po bid + Spironolactone 50mg po bid?  (2)Dose new meds for GFR 20-25ml/min  (3)BMP+Mg at least daily while admitted    Delilah Foy [facility]  [phone]        IMPRESSION: 70M w/ HTN, DM2, CAD, HFrEF, and CKD4, 3/26/25 p/w ADHF    (1)CKD - 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 - numbers fluctuating based on hemodynamics, labs pending collection   (2)Hyponatremia - as of late - sp hypertonic saline yesterday, corrects to ~131 for glucose  (3)CV - acute on chronic HFrEF - improved relative to admission, with Bumex gtt    RECOMMEND:  (1)Can advance to high-dose PO diuretics - Lasix 80mg po bid + Spironolactone 50mg po bid?  (2)Dose new meds for GFR 20-25ml/min  (3)BMP+Mg at least daily while admitted    Delilah Foy DNP  [facility]  [phone]        IMPRESSION: 70M w/ HTN, DM2, CAD, HFrEF, and CKD4, 3/26/25 p/w ADHF    (1)CKD - 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 - numbers fluctuating based on hemodynamics, labs pending collection   (2)Hyponatremia - as of late - sp hypertonic saline yesterday, corrects to ~131 for glucose  (3)CV - acute on chronic HFrEF - improved relative to admission, with Bumex gtt    RECOMMEND:  (1)Can advance to high-dose PO diuretics - Lasix 80mg po bid + Spironolactone 50mg po bid?  (2)Dose new meds for GFR 20-25ml/min  (3)BMP+Mg at least daily while admitted    Delilah Foy DNP  [facility]  [phone]       RENAL ATTENDING NOTE  Patient seen and examined with NP. Agree with assessment and plan as above.    Markell Walton MD  [facility]  [phone]

## 2025-04-10 NOTE — PROGRESS NOTE ADULT - ASSESSMENT
Assessment  DMT2: 70y Male with DM T2 with hyperglycemia admitted with SOB,  patient was on oral hypoglycemic agents at home, now on  insulin coverage, blood sugars are fluctuating.   Patient is high risk with high level decision making due to uncontrolled diabetes, has increased risk for complications.   CAD: on medications, monitored, no acute events.  HTN: On antihypertensive medications, monitored, stable.      Discussed plan and management with Dr Flo Jamil NP - TEAMS  Myrna Rossi MD  Cell: [phone]  Office: [phone]      Assessment  DMT2: 70y Male with DM T2 with hyperglycemia admitted with SOB,  patient was on oral hypoglycemic agents at home, now on  insulin coverage, blood sugars are fluctuating.   Patient is high risk with high level decision making due to uncontrolled diabetes, has increased risk for complications.   CAD: on medications, monitored, no acute events.  HTN: On antihypertensive medications, monitored, stable.      Discussed plan and management with Dr Flo Jamil NP - TEAMS  Myrna Rossi MD  Cell: [phone]  Office: [phone]

## 2025-04-10 NOTE — PROGRESS NOTE ADULT - PROBLEM SELECTOR PLAN 1
-Etiology: likely ischemic  -GDMT: Continue hydralazine to 100mg TID, hold for SBP < 90    - Continue spironolactone to 25mg BID, ISDN 30mg TID, Toprol XL 75mg daily.  -Eventual ARB/ARNi if renal function permits  -Will hold off on SGLT2-i for now given PAD with ulcers and fluctuating creatinine   -Diuretics: Recommend continuing hypertonic saline 3% 150ml/30 min [id number] and would continue bumex gtt to 2mg/hour with close monitoring of electrolytes. Recommend adding a dose of metolzone 5mg x 1 today.  -Will check cardiomems readings intermittently. Goal to be established once optimized.  -Strict I/Os and daily weights  -Keep K>4 and Mg>2  - PT

## 2025-04-10 NOTE — PROGRESS NOTE ADULT - ASSESSMENT
vommitting or diarrhea.  No jaundice or abdominal pain, change in bowel habits, black or bloody stools.  No dysuria or hematuria or frequency of urination.   No myalgias or muscle pain.  No numbness, weakness, or tingling. No falls, or loss of consciousness. No weight loss or back pain. No falls.  No paresthesias. No joint swelling or redness. No joint pain. No recent weight loss. No focal weakness or sensory deficits or paresthesias, No confusion or altered sensorium. No hematemesis. No hearing loss. No siezures. All other systems were reviewed, and review was negative.   Objective:   Physical Exam  /86   Pulse 78   Temp 96.9 °F (36.1 °C)   Ht 1.829 m (6')   Wt 130.6 kg (288 lb)   SpO2 97%   BMI 39.06 kg/m²    The physical exam reveals a patient who appears well, alert and oriented x 3, pleasant, cooperative. Vitals are as noted. Head is atraumatic and normocephalic. Eyes reveal normal conjunctiva, cornea normal, pupils are equal and rective to light. Nasal mucosa is normal. Throat is normal without exudates. Ears reveal normal tympanic membranes.Neck is supple and free of adenopathy, or masses. No thyromegaly. No jugular venous distension. Lungs are clear to auscultation, no rales or rhonchi noted. Heart sounds are regular , no murmurs, clicks, gallops or rubs. Abdomen is soft, no tenderness, masses or organomegaly. Bowel sounds are normally heard.Pelvis: normal. Extremities are normal. Peripheral pulses are normal. Screening neurological exam is normal without focal findings. Cranial nerves are intact, reflexes are symmetrical and muscle strength eaqual. Skin is normal without suspicious lesions noted.   Assessment:      Attention deficit hyperactivity disorder (ADHD), combined type  Off meds.      Essential hypertension  This is a chronic problem. The problem is well controlled.  Patient monitors readings regularly. Pertinent negatives include no chest pain, focal sensory loss, focal weakness, leg    ·  Problem: Acute decompensated heart failure. :  questionable compliance   Bumex drip   discussed with CHF team : cardioMEMS still elevated :  fu repeat     bumex per HF   monitor Cr /Na  cont afterload reduction    check Va duplex:   Negative   fu with HF team     ·  Problem: Wound of left foot: Left dorsal foot ulceration: Stable, noninfected, present on admission  local wound care per pod       ·  Problem: Type 2 diabetes mellitus.: uncontrolled  A1c  worse than in the past hence questionable complaince   endo consulted     ·  Problem: Afib.  ·  Plan: -c/w xarelto and toprol.    ·  Problem: CAD (coronary artery disease).  ·  Plan: -c/w asa 81mg       Stage 4 chronic kidney disease.   ·  Plan: -stable. eval by nephro.   -dose meds per renal fx.

## 2025-04-10 NOTE — PROGRESS NOTE ADULT - PROBLEM SELECTOR PLAN 2
-on xarelto 15mg QD and BB  -monitor on tele.  - Consider EP consult when optimized for evaluation for rhythm control.

## 2025-04-10 NOTE — PROGRESS NOTE ADULT - SUBJECTIVE AND OBJECTIVE BOX
Subjective:  - Walking minimally but denies SOB, lightheadedness, CP at rest or with short walks in room  - Denies orthopnea    Medications:  acetaminophen     Tablet .. 650 milliGRAM(s) Oral every 6 hours PRN  aluminum hydroxide/magnesium hydroxide/simethicone Suspension 30 milliLiter(s) Oral every 4 hours PRN  AQUAPHOR (petrolatum Ointment) 1 Application(s) Topical two times a day  aspirin  chewable 81 milliGRAM(s) Oral daily  buMETAnide Infusion 2 mG/Hr IV Continuous <Continuous>  dextrose 5%. 1000 milliLiter(s) IV Continuous <Continuous>  dextrose 5%. 1000 milliLiter(s) IV Continuous <Continuous>  dextrose 50% Injectable 25 Gram(s) IV Push once  dextrose 50% Injectable 12.5 Gram(s) IV Push once  dextrose 50% Injectable 25 Gram(s) IV Push once  dextrose Oral Gel 15 Gram(s) Oral once PRN  glucagon  Injectable 1 milliGRAM(s) IntraMuscular once  hydrALAZINE 100 milliGRAM(s) Oral three times a day  insulin glargine Injectable (LANTUS) 9 Unit(s) SubCutaneous at bedtime  insulin lispro (ADMELOG) corrective regimen sliding scale   SubCutaneous three times a day before meals  insulin lispro (ADMELOG) corrective regimen sliding scale   SubCutaneous at bedtime  insulin lispro Injectable (ADMELOG) 6 Unit(s) SubCutaneous three times a day before meals  isosorbide   dinitrate Tablet (ISORDIL) 30 milliGRAM(s) Oral three times a day  melatonin 3 milliGRAM(s) Oral at bedtime PRN  metoprolol succinate ER 75 milliGRAM(s) Oral daily  Nephro-daniel 1 Tablet(s) Oral daily  ondansetron Injectable 4 milliGRAM(s) IV Push every 8 hours PRN  ranolazine 1000 milliGRAM(s) Oral two times a day  rivaroxaban 15 milliGRAM(s) Oral with dinner  sodium chloride 3% Bolus 150 milliLiter(s) IV Bolus <User Schedule>  spironolactone 25 milliGRAM(s) Oral two times a day      Physical Exam:    Vitals:  Vital Signs Last 24 Hours  T(C): 36.4 (04-10-25 @ 11:58), Max: 36.9 (04-10-25 @ 04:06)  HR: 71 (04-10-25 @ 11:58) (71 - 92)  BP: 125/68 (04-10-25 @ 11:58) (117/60 - 148/70)  RR: 18 (04-10-25 @ 11:58) (18 - 18)  SpO2: 96% (04-10-25 @ 11:58) (96% - 100%)    Weight in k.3 (04-10 @ 04:06)    I&O's Summary    2025 07:  -  10 Apr 2025 07:00  --------------------------------------------------------  IN: 860 mL / OUT: 2300 mL / NET: -1440 mL    10 Apr 2025 07:01  -  10 Apr 2025 16:09  --------------------------------------------------------  IN: 600 mL / OUT: 800 mL / NET: -200 mL    Tele: afib    General: No distress. Comfortable.  HEENT: EOM intact.  Neck: Neck supple. JVP moderately elevated ~14-16cm H2O No masses  Chest: Clear to auscultation bilaterally  CV: Normal S1 and S2. No murmurs, rub, or gallops. Trace pitting edema bilaterally  Abdomen: Soft, non-distended, non-tender  Skin: No rashes or skin breakdown  Neurology: Alert and oriented times three. Sensation intact  Psych: Affect normal    Labs:    04-10    130[L]  |  93[L]  |  80[H]  ----------------------------<  134[H]  4.2   |  20[L]  |  2.88[H]    Ca    9.7      10 Apr 2025 14:57  Mg     2.6     04-10

## 2025-04-10 NOTE — PROGRESS NOTE ADULT - PROBLEM SELECTOR PLAN 3
- hx of multiple stents, dLAD 100% occluded  -on ASA   - continue on increased dose of Ranexa 1000mg BID and Toprol XL

## 2025-04-10 NOTE — PROGRESS NOTE ADULT - SUBJECTIVE AND OBJECTIVE BOX
SUBJECTIVE:  NO CP no SOB Overall feeling better     MEDICATIONS:  buMETAnide Infusion 2 mG/Hr IV Continuous <Continuous>  hydrALAZINE 100 milliGRAM(s) Oral three times a day  isosorbide   dinitrate Tablet (ISORDIL) 30 milliGRAM(s) Oral three times a day  metoprolol succinate ER 75 milliGRAM(s) Oral daily  ranolazine 1000 milliGRAM(s) Oral two times a day  spironolactone 25 milliGRAM(s) Oral two times a day        acetaminophen     Tablet .. 650 milliGRAM(s) Oral every 6 hours PRN  melatonin 3 milliGRAM(s) Oral at bedtime PRN  ondansetron Injectable 4 milliGRAM(s) IV Push every 8 hours PRN    aluminum hydroxide/magnesium hydroxide/simethicone Suspension 30 milliLiter(s) Oral every 4 hours PRN    dextrose 50% Injectable 25 Gram(s) IV Push once  dextrose 50% Injectable 12.5 Gram(s) IV Push once  dextrose 50% Injectable 25 Gram(s) IV Push once  dextrose Oral Gel 15 Gram(s) Oral once PRN  glucagon  Injectable 1 milliGRAM(s) IntraMuscular once  insulin glargine Injectable (LANTUS) 9 Unit(s) SubCutaneous at bedtime  insulin lispro (ADMELOG) corrective regimen sliding scale   SubCutaneous three times a day before meals  insulin lispro (ADMELOG) corrective regimen sliding scale   SubCutaneous at bedtime  insulin lispro Injectable (ADMELOG) 6 Unit(s) SubCutaneous three times a day before meals    AQUAPHOR (petrolatum Ointment) 1 Application(s) Topical two times a day  aspirin  chewable 81 milliGRAM(s) Oral daily  dextrose 5%. 1000 milliLiter(s) IV Continuous <Continuous>  dextrose 5%. 1000 milliLiter(s) IV Continuous <Continuous>  Nephro-daniel 1 Tablet(s) Oral daily  rivaroxaban 15 milliGRAM(s) Oral with dinner      REVIEW OF SYSTEMS:    CONSTITUTIONAL: No fever, weight loss, or fatigue  EYES: No eye pain, visual disturbances, or discharge  NECK: No pain or stiffness  RESPIRATORY: No cough, wheezing, chills or hemoptysis; No Shortness of Breath  CARDIOVASCULAR: No chest pain, palpitations, dizziness, or leg swelling  GASTROINTESTINAL: No abdominal or epigastric pain. No nausea, vomiting, or hematemesis; No diarrhea or constipation. No melena or hematochezia.  GENITOURINARY: No dysuria, frequency, hematuria, or incontinence  NEUROLOGICAL: No headaches, memory loss, loss of strength, numbness, or tremors  SKIN: No itching, burning, rashes, or lesions   LYMPH Nodes: No enlarged glands  MUSCULOSKELETAL: No joint pain or swelling; No muscle, back, or extremity pain  All other review of systems are negative.  	  [ ] Unable to obtain    PHYSICAL EXAM:  T(C): 36.9 (04-10-25 @ 04:06), Max: 36.9 (04-10-25 @ 04:06)  HR: 80 (04-10-25 @ 04:06) (75 - 92)  BP: 117/60 (04-10-25 @ 06:40) (104/83 - 148/70)  RR: 18 (04-10-25 @ 04:06) (18 - 18)  SpO2: 100% (04-10-25 @ 04:06) (97% - 100%)  Wt(kg): --  I&O's Summary    09 Apr 2025 07:01  -  10 Apr 2025 07:00  --------------------------------------------------------  IN: 860 mL / OUT: 2300 mL / NET: -1440 mL          PHYSICAL EXAM    Appearance: Normal	  HEENT:   Normal oral mucosa, PERRL, EOMI	  NECK: Soft and supple, No LAD, No JVD  Cardiovascular: Regular Rate and Rhythm, Normal S1 S2, No murmurs, No clicks, gallops or rubs  Respiratory: Lungs clear to auscultation	  Gastrointestinal:  Soft, Non-tender, + BS	  Skin: No rashes, No ecchymoses, No cyanosis  Neurologic: Non-focal  Extremities: No clubbing, cyanosis or edema  Vascular: Peripheral pulses palpable 2+ bilaterally  	    LABS:	 	    04-09    128[L]  |  91[L]  |  81[H]  ----------------------------<  286[H]  4.6   |  17[L]  |  2.80[H]    Ca    9.4      09 Apr 2025 19:28

## 2025-04-10 NOTE — PROGRESS NOTE ADULT - PROBLEM SELECTOR PLAN 1
Lantus  9 units at bed time.   Admelog  6  units before each meal in addition to Admelog correction scale coverage.  Patient counseled for compliance with consistent low carb diet and physical activity as tolerated.    Can be discharged on current basal bolus insulins  He may use his home insulin brands  FU outpt for further management

## 2025-04-10 NOTE — PROGRESS NOTE ADULT - SUBJECTIVE AND OBJECTIVE BOX
Date of service: 04-10-25 @ 10:45      Patient is a 70y old  Male who presents with a chief complaint of CHF (2025 10:18)                                                               INTERVAL HPI/OVERNIGHT EVENTS:    REVIEW OF SYSTEMS:     CONSTITUTIONAL: No weakness, fevers or chills  EYES/ENT: No visual changes , no ear ache   NECK: No pain or stiffness  RESPIRATORY: No cough, wheezing,  No shortness of breath  CARDIOVASCULAR: No chest pain or palpitations  GASTROINTESTINAL: No abdominal pain  . No nausea, vomiting, or hematemesis; No diarrhea or constipation. No melena or hematochezia.  GENITOURINARY: No dysuria, frequency or hematuria  NEUROLOGICAL: No numbness or weakness  SKIN: No itching, burning, rashes, or lesions                                                                                                                                                                                                                                                                                 Medications:  MEDICATIONS  (STANDING):  AQUAPHOR (petrolatum Ointment) 1 Application(s) Topical two times a day  aspirin  chewable 81 milliGRAM(s) Oral daily  buMETAnide Infusion 2 mG/Hr (10 mL/Hr) IV Continuous <Continuous>  dextrose 5%. 1000 milliLiter(s) (100 mL/Hr) IV Continuous <Continuous>  dextrose 5%. 1000 milliLiter(s) (50 mL/Hr) IV Continuous <Continuous>  dextrose 50% Injectable 25 Gram(s) IV Push once  dextrose 50% Injectable 12.5 Gram(s) IV Push once  dextrose 50% Injectable 25 Gram(s) IV Push once  glucagon  Injectable 1 milliGRAM(s) IntraMuscular once  hydrALAZINE 100 milliGRAM(s) Oral three times a day  insulin glargine Injectable (LANTUS) 9 Unit(s) SubCutaneous at bedtime  insulin lispro (ADMELOG) corrective regimen sliding scale   SubCutaneous three times a day before meals  insulin lispro (ADMELOG) corrective regimen sliding scale   SubCutaneous at bedtime  insulin lispro Injectable (ADMELOG) 6 Unit(s) SubCutaneous three times a day before meals  isosorbide   dinitrate Tablet (ISORDIL) 30 milliGRAM(s) Oral three times a day  metoprolol succinate ER 75 milliGRAM(s) Oral daily  Nephro-daniel 1 Tablet(s) Oral daily  ranolazine 1000 milliGRAM(s) Oral two times a day  rivaroxaban 15 milliGRAM(s) Oral with dinner  spironolactone 25 milliGRAM(s) Oral two times a day    MEDICATIONS  (PRN):  acetaminophen     Tablet .. 650 milliGRAM(s) Oral every 6 hours PRN Temp greater or equal to 38C (100.4F), Mild Pain (1 - 3)  aluminum hydroxide/magnesium hydroxide/simethicone Suspension 30 milliLiter(s) Oral every 4 hours PRN Dyspepsia  dextrose Oral Gel 15 Gram(s) Oral once PRN Blood Glucose LESS THAN 70 milliGRAM(s)/deciliter  melatonin 3 milliGRAM(s) Oral at bedtime PRN Insomnia  ondansetron Injectable 4 milliGRAM(s) IV Push every 8 hours PRN Nausea and/or Vomiting       Allergies    atorvastatin (Muscle Pain (Severe))    Intolerances      Vital Signs Last 24 Hrs  T(C): 36.9 (10 Apr 2025 04:06), Max: 36.9 (10 Apr 2025 04:06)  T(F): 98.4 (10 Apr 2025 04:06), Max: 98.4 (10 Apr 2025 04:06)  HR: 80 (10 Apr 2025 04:06) (75 - 92)  BP: 117/60 (10 Apr 2025 06:40) (104/83 - 148/70)  BP(mean): --  RR: 18 (10 Apr 2025 04:06) (18 - 18)  SpO2: 100% (10 Apr 2025 04:06) (97% - 100%)    Parameters below as of 10 Apr 2025 04:06  Patient On (Oxygen Delivery Method): room air      CAPILLARY BLOOD GLUCOSE      POCT Blood Glucose.: 100 mg/dL (10 Apr 2025 08:56)  POCT Blood Glucose.: 81 mg/dL (2025 21:04)  POCT Blood Glucose.: 374 mg/dL (2025 17:27)  POCT Blood Glucose.: 366 mg/dL (2025 12:40)      04-09 @ 07:01  -  04-10 @ 07:00  --------------------------------------------------------  IN: 860 mL / OUT: 2300 mL / NET: -1440 mL      Physical Exam:    Daily     Daily Weight in k.3 (10 Apr 2025 04:06)  General: NAD   HEENT:  Nonicteric, PERRLA  CV:  RRR, S1S2   Lungs:  CTA B/L, no wheezes, rales, rhonchi  Abdomen:  Soft, non-tender, no distended, positive BS  Extremities:   edema   Neuro:  AAOx3, non-focal, grossly intact                                                                                                                                                                                                                                                                                                LABS:                                128[L]  |  91[L]  |  81[H]  ----------------------------<  286[H]  4.6   |  17[L]  |  2.80[H]    Ca    9.4      2025 19:28                         RADIOLOGY & ADDITIONAL TESTS         I personally reviewed: [  ]EKG   [  ]CXR    [  ] CT      A/P:         Discussed with :     Derek consultants' Notes   Time spent :

## 2025-04-10 NOTE — PROGRESS NOTE ADULT - SUBJECTIVE AND OBJECTIVE BOX
NEPHROLOGY     Patient seen and examined sitting on bed, denies pain, no sob, in no acute distress.     MEDICATIONS  (STANDING):  AQUAPHOR (petrolatum Ointment) 1 Application(s) Topical two times a day  aspirin  chewable 81 milliGRAM(s) Oral daily  buMETAnide Infusion 2 mG/Hr (10 mL/Hr) IV Continuous <Continuous>  dextrose 5%. 1000 milliLiter(s) (50 mL/Hr) IV Continuous <Continuous>  dextrose 5%. 1000 milliLiter(s) (100 mL/Hr) IV Continuous <Continuous>  dextrose 50% Injectable 25 Gram(s) IV Push once  dextrose 50% Injectable 12.5 Gram(s) IV Push once  dextrose 50% Injectable 25 Gram(s) IV Push once  glucagon  Injectable 1 milliGRAM(s) IntraMuscular once  hydrALAZINE 100 milliGRAM(s) Oral three times a day  insulin glargine Injectable (LANTUS) 9 Unit(s) SubCutaneous at bedtime  insulin lispro (ADMELOG) corrective regimen sliding scale   SubCutaneous three times a day before meals  insulin lispro (ADMELOG) corrective regimen sliding scale   SubCutaneous at bedtime  insulin lispro Injectable (ADMELOG) 6 Unit(s) SubCutaneous three times a day before meals  isosorbide   dinitrate Tablet (ISORDIL) 30 milliGRAM(s) Oral three times a day  metoprolol succinate ER 75 milliGRAM(s) Oral daily  Nephro-daniel 1 Tablet(s) Oral daily  ranolazine 1000 milliGRAM(s) Oral two times a day  rivaroxaban 15 milliGRAM(s) Oral with dinner  spironolactone 25 milliGRAM(s) Oral two times a day    VITALS:  T(C): , Max: 36.9 (04-10-25 @ 04:06)  T(F): , Max: 98.4 (04-10-25 @ 04:06)  HR: 71 (04-10-25 @ 11:58)  BP: 125/68 (04-10-25 @ 11:58)  RR: 18 (04-10-25 @ 11:58)  SpO2: 96% (04-10-25 @ 11:58)    I and O's:    04-09 @ 07:01  -  04-10 @ 07:00  --------------------------------------------------------  IN: 860 mL / OUT: 2300 mL / NET: -1440 mL    PHYSICAL EXAM:  Constitutional: NAD, Alert  HEENT: NCAT, MMM  Neck: Supple, No JVD  Respiratory: CTA-b/l  Cardiovascular: RRR s1s2, no m/r/g  Gastrointestinal: BS+, soft, NT/ND  Extremities: 1+ b/l LE edema  Neurological: no focal deficits; strength grossly intact  Back: no CVAT b/l  Skin: No rashes, no nevi    LABS:    04-09    128[L]  |  91[L]  |  81[H]  ----------------------------<  286[H]  4.6   |  17[L]  |  2.80[H]    Ca    9.4      09 Apr 2025 19:28

## 2025-04-11 NOTE — PROGRESS NOTE ADULT - PROBLEM SELECTOR PLAN 3
- hx of multiple stents, dLAD 100% occluded  -on ASA   - continue on increased dose of Ranexa 1000mg BID and Toprol XL -

## 2025-04-11 NOTE — PROGRESS NOTE ADULT - ASSESSMENT
IMPRESSION: 70M w/ HTN, DM2, CAD, HFrEF, and CKD4, 3/26/25 p/w ADHF    (1)CKD - 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 - numbers fluctuating based on hemodynamics, labs pending collection   (2)Hyponatremia - as of late - sp hypertonic saline yesterday, corrects to ~131 for glucose  (3)CV - acute on chronic HFrEF - improved relative to admission, with Bumex gtt    RECOMMEND:  (1)No objection to bumex gtt, if creatinine continues to trend up would consider reducing/ transitioning to po regimen  (2)Dose new meds for GFR 20-25ml/min  (3)BMP+Mg at least daily while admitted    Delilah Foy [facility]  [phone]        IMPRESSION: 70M w/ HTN, DM2, CAD, HFrEF, and CKD4, 3/26/25 p/w ADHF    (1)CKD - 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 - numbers fluctuating based on hemodynamics, labs pending collection   (2)Hyponatremia - as of late - sp hypertonic saline yesterday, corrects to ~131 for glucose  (3)CV - acute on chronic HFrEF - improved relative to admission, with Bumex gtt    RECOMMEND:  (1)No objection to bumex gtt, if creatinine continues to trend up would consider reducing/ transitioning to po regimen  (2)Dose new meds for GFR 20-25ml/min  (3)BMP+Mg at least daily while admitted    Delilah Foy DNP  [facility]  [phone]       RENAL ATTENDING NOTE  Patient seen and examined with NP. Agree with assessment and plan as above.  No objection to the present level of permissive azotemia here, with creatinine in the 3.0-3.5 range. I would not look to have creatinine climb much higher than that; at some point, with further diuresis I would argue the risk from uremic complications, gout, etc would outweigh the benefit.    Markell Walton MD  [facility]  [phone]

## 2025-04-11 NOTE — PROGRESS NOTE ADULT - ASSESSMENT
71 y/o WM, with hx of HFmrEF, and PMH of CAD (hx of multiple stents, occluded 100% dLAD not amenable to PCI), Afib (on Xarelto), T2DM, CKD4, HTN, known PAD s/p recent LLE with PT and peroneal stent placement (1/23), is here for SOB and BLE for ADHF and also for L foot wound. 4/4/25 RHC/cardiomems (formal report pending) RA 28, RV 85/6, PA 78/32 (47), PCWP 30, CO/CI: 2.72/1.67  - CardioMEMS placed 4/4/25  - PAD 36 on 4/10  36 on 4/9 39 on 4/8  - await PAD today  - cont afterload reduction  - Per HF team: hypertonic saline 3% 150ml/30 min H2mutib and would continue bumex gtt to 2mg/hour with close monitoring of electrolytes.  - Spironolactone 25 BID, ISDN 30 TID Toprol XL 75 QD BUSTAMANTE 100 TID = GDMT  - Holding off SGLT2 and ACE ARB ARNI given BARRY - consider introduction if OK with Nephrology and HF team   - appreciate Nephrology and HF team follow up   - if difficulty to optimize continues in light of low CO - ? potential transplant candidate - await HF team f/u  - D/w pt

## 2025-04-11 NOTE — PROGRESS NOTE ADULT - PROBLEM SELECTOR PLAN 2
-on xarelto 15mg QD and BB  -monitor on tele.  - Consider EP consult when optimized for evaluation for rhythm control. \

## 2025-04-11 NOTE — PROGRESS NOTE ADULT - SUBJECTIVE AND OBJECTIVE BOX
Subjective:  -s/p HTS and metalozone  -NAEO    Medications:  acetaminophen     Tablet .. 650 milliGRAM(s) Oral every 6 hours PRN  aluminum hydroxide/magnesium hydroxide/simethicone Suspension 30 milliLiter(s) Oral every 4 hours PRN  AQUAPHOR (petrolatum Ointment) 1 Application(s) Topical two times a day  aspirin  chewable 81 milliGRAM(s) Oral daily  buMETAnide Infusion 2 mG/Hr IV Continuous <Continuous>  dextrose 5%. 1000 milliLiter(s) IV Continuous <Continuous>  dextrose 5%. 1000 milliLiter(s) IV Continuous <Continuous>  dextrose 50% Injectable 25 Gram(s) IV Push once  dextrose 50% Injectable 12.5 Gram(s) IV Push once  dextrose 50% Injectable 25 Gram(s) IV Push once  dextrose Oral Gel 15 Gram(s) Oral once PRN  glucagon  Injectable 1 milliGRAM(s) IntraMuscular once  hydrALAZINE 100 milliGRAM(s) Oral three times a day  insulin glargine Injectable (LANTUS) 9 Unit(s) SubCutaneous at bedtime  insulin lispro (ADMELOG) corrective regimen sliding scale   SubCutaneous three times a day before meals  insulin lispro (ADMELOG) corrective regimen sliding scale   SubCutaneous at bedtime  insulin lispro Injectable (ADMELOG) 6 Unit(s) SubCutaneous three times a day before meals  isosorbide   dinitrate Tablet (ISORDIL) 40 milliGRAM(s) Oral three times a day  melatonin 3 milliGRAM(s) Oral at bedtime PRN  metolazone 5 milliGRAM(s) Oral daily  Nephro-daniel 1 Tablet(s) Oral daily  ondansetron Injectable 4 milliGRAM(s) IV Push every 8 hours PRN  ranolazine 1000 milliGRAM(s) Oral two times a day  rivaroxaban 15 milliGRAM(s) Oral with dinner  sodium chloride 3% Bolus 150 milliLiter(s) IV Bolus <User Schedule>  spironolactone 25 milliGRAM(s) Oral two times a day      Physical Exam:    Vitals:  Vital Signs Last 24 Hours  T(C): 36.1 (25 @ 06:01), Max: 36.8 (04-10-25 @ 20:50)  HR: 86 (25 @ 10:40) (52 - 87)  BP: 120/58 (25 @ 10:40) (120/58 - 147/79)  RR: 18 (25 @ 06:01) (18 - 18)  SpO2: 95% (25 @ 06:01) (95% - 97%)    Weight in k.3 ( @ 06:26)    I&O's Summary    10 Apr 2025 07:01  -  2025 07:00  --------------------------------------------------------  IN: 1400 mL / OUT: 3250 mL / NET: -1850 mL        Tele: Afib 80s    General: No distress. Comfortable.  HEENT: EOM intact.  Neck: Neck supple. JVP elevated to mandible  Chest: Clear to auscultation bilaterally  CV: Normal S1 and S2. No murmurs, rub, or gallops. Radial pulses normal.  Abdomen: Soft, non-distended, non-tender  Skin: No rashes or skin breakdown. + BLE edema  Neurology: Alert and oriented times three. Sensation intact  Psych: Affect normal    Labs:        132[L]  |  91[L]  |  89[H]  ----------------------------<  164[H]  3.6   |  21[L]  |  3.17[H]    Ca    10.0      2025 06:32  Mg     2.6     04-10    TPro  7.1  /  Alb  4.0  /  TBili  1.7[H]  /  DBili  x   /  AST  20  /  ALT  14  /  AlkPhos  144[H]

## 2025-04-11 NOTE — PROGRESS NOTE ADULT - SUBJECTIVE AND OBJECTIVE BOX
SUBJECTIVE:  NO CP no SOB Eating breakfast  	  MEDICATIONS:  buMETAnide Infusion 2 mG/Hr IV Continuous <Continuous>  hydrALAZINE 100 milliGRAM(s) Oral three times a day  isosorbide   dinitrate Tablet (ISORDIL) 30 milliGRAM(s) Oral three times a day  metoprolol succinate ER 75 milliGRAM(s) Oral daily  ranolazine 1000 milliGRAM(s) Oral two times a day  spironolactone 25 milliGRAM(s) Oral two times a day        acetaminophen     Tablet .. 650 milliGRAM(s) Oral every 6 hours PRN  melatonin 3 milliGRAM(s) Oral at bedtime PRN  ondansetron Injectable 4 milliGRAM(s) IV Push every 8 hours PRN    aluminum hydroxide/magnesium hydroxide/simethicone Suspension 30 milliLiter(s) Oral every 4 hours PRN    dextrose 50% Injectable 25 Gram(s) IV Push once  dextrose 50% Injectable 12.5 Gram(s) IV Push once  dextrose 50% Injectable 25 Gram(s) IV Push once  dextrose Oral Gel 15 Gram(s) Oral once PRN  glucagon  Injectable 1 milliGRAM(s) IntraMuscular once  insulin glargine Injectable (LANTUS) 9 Unit(s) SubCutaneous at bedtime  insulin lispro (ADMELOG) corrective regimen sliding scale   SubCutaneous three times a day before meals  insulin lispro (ADMELOG) corrective regimen sliding scale   SubCutaneous at bedtime  insulin lispro Injectable (ADMELOG) 6 Unit(s) SubCutaneous three times a day before meals    AQUAPHOR (petrolatum Ointment) 1 Application(s) Topical two times a day  aspirin  chewable 81 milliGRAM(s) Oral daily  dextrose 5%. 1000 milliLiter(s) IV Continuous <Continuous>  dextrose 5%. 1000 milliLiter(s) IV Continuous <Continuous>  Nephro-daniel 1 Tablet(s) Oral daily  rivaroxaban 15 milliGRAM(s) Oral with dinner      REVIEW OF SYSTEMS:    CONSTITUTIONAL: No fever, weight loss, or fatigue  EYES: No eye pain, visual disturbances, or discharge  NECK: No pain or stiffness  RESPIRATORY: No cough, wheezing, chills or hemoptysis; No Shortness of Breath  CARDIOVASCULAR: No chest pain, palpitations, dizziness, or leg swelling  GASTROINTESTINAL: No abdominal or epigastric pain. No nausea, vomiting, or hematemesis; No diarrhea or constipation. No melena or hematochezia.  GENITOURINARY: No dysuria, frequency, hematuria, or incontinence  NEUROLOGICAL: No headaches, memory loss, loss of strength, numbness, or tremors  SKIN: No itching, burning, rashes, or lesions   LYMPH Nodes: No enlarged glands  MUSCULOSKELETAL: No joint pain or swelling; No muscle, back, or extremity pain  All other review of systems are negative.  	  [ ] Unable to obtain    PHYSICAL EXAM:  T(C): 36.1 (04-11-25 @ 06:01), Max: 36.8 (04-10-25 @ 20:50)  HR: 87 (04-11-25 @ 06:01) (52 - 87)  BP: 147/79 (04-11-25 @ 06:01) (125/68 - 147/79)  RR: 18 (04-11-25 @ 06:01) (18 - 18)  SpO2: 95% (04-11-25 @ 06:01) (95% - 97%)  Wt(kg): --  I&O's Summary    10 Apr 2025 07:01  -  11 Apr 2025 07:00  --------------------------------------------------------  IN: 1400 mL / OUT: 3250 mL / NET: -1850 mL          PHYSICAL EXAM    Appearance: Normal	  HEENT:   Normal oral mucosa, PERRL, EOMI	  NECK: Soft and supple, No LAD, No JVD  Cardiovascular: Regular Rate and Rhythm, Normal S1 S2, No murmurs, No clicks, gallops or rubs  Respiratory: Lungs clear to auscultation	  Gastrointestinal:  Soft, Non-tender, + BS	  Skin: No rashes, No ecchymoses, No cyanosis  Neurologic: Non-focal  Extremities: No clubbing, cyanosis or 1-2+ edema  Vascular: Peripheral pulses palpable 2+ bilaterally    TELEMETRY: 	    A flutter 60-90  LABS:	 	    04-11    132[L]  |  91[L]  |  89[H]  ----------------------------<  164[H]  3.6   |  21[L]  |  3.17[H]    Ca    10.0      11 Apr 2025 06:32  Mg     2.6     04-10    TPro  7.1  /  Alb  4.0  /  TBili  1.7[H]  /  DBili  x   /  AST  20  /  ALT  14  /  AlkPhos  144[H]  04-11

## 2025-04-11 NOTE — PROGRESS NOTE ADULT - SUBJECTIVE AND OBJECTIVE BOX
Podiatry pager #: [phone]/ [phone]    Patient is a 70y old  Male who presents with a chief complaint of HF (11 Apr 2025 10:14)Podiatry seen today for reevaluation of left foot wound       INTERVAL HPI/OVERNIGHT EVENTS:  Patient seen and evaluated at bedside.  Pt is resting comfortable in NAD. Denies N/V/F/C.  Pain rated at X/10    Allergies    atorvastatin (Muscle Pain (Severe))    Intolerances        Vital Signs Last 24 Hrs  T(C): 36.3 (12 Apr 2025 04:26), Max: 36.9 (11 Apr 2025 11:52)  T(F): 97.4 (12 Apr 2025 04:26), Max: 98.5 (11 Apr 2025 11:52)  HR: 84 (12 Apr 2025 05:00) (74 - 86)  BP: 147/68 (12 Apr 2025 05:00) (91/52 - 147/68)  BP(mean): 86 (11 Apr 2025 11:52) (86 - 86)  RR: 18 (12 Apr 2025 04:26) (18 - 18)  SpO2: 93% (12 Apr 2025 04:26) (93% - 97%)    Parameters below as of 12 Apr 2025 04:26  Patient On (Oxygen Delivery Method): room air        acetaminophen     Tablet .. 650 milliGRAM(s) Oral every 6 hours PRN  aluminum hydroxide/magnesium hydroxide/simethicone Suspension 30 milliLiter(s) Oral every 4 hours PRN  AQUAPHOR (petrolatum Ointment) 1 Application(s) Topical two times a day  aspirin  chewable 81 milliGRAM(s) Oral daily  bisacodyl 5 milliGRAM(s) Oral every 12 hours PRN  buMETAnide Infusion 2 mG/Hr IV Continuous <Continuous>  dextrose 5%. 1000 milliLiter(s) IV Continuous <Continuous>  dextrose 5%. 1000 milliLiter(s) IV Continuous <Continuous>  dextrose 50% Injectable 25 Gram(s) IV Push once  dextrose 50% Injectable 12.5 Gram(s) IV Push once  dextrose 50% Injectable 25 Gram(s) IV Push once  dextrose Oral Gel 15 Gram(s) Oral once PRN  glucagon  Injectable 1 milliGRAM(s) IntraMuscular once  hydrALAZINE 100 milliGRAM(s) Oral three times a day  insulin glargine Injectable (LANTUS) 9 Unit(s) SubCutaneous at bedtime  insulin lispro (ADMELOG) corrective regimen sliding scale   SubCutaneous three times a day before meals  insulin lispro (ADMELOG) corrective regimen sliding scale   SubCutaneous at bedtime  insulin lispro Injectable (ADMELOG) 6 Unit(s) SubCutaneous three times a day before meals  isosorbide   dinitrate Tablet (ISORDIL) 40 milliGRAM(s) Oral three times a day  melatonin 3 milliGRAM(s) Oral at bedtime PRN  metolazone 5 milliGRAM(s) Oral daily  metoprolol succinate  milliGRAM(s) Oral daily  Nephro-daniel 1 Tablet(s) Oral daily  ondansetron Injectable 4 milliGRAM(s) IV Push every 8 hours PRN  ranolazine 1000 milliGRAM(s) Oral two times a day  rivaroxaban 15 milliGRAM(s) Oral with dinner  senna 2 Tablet(s) Oral at bedtime  spironolactone 25 milliGRAM(s) Oral two times a day      LABS:    04-12    134[L]  |  91[L]  |  100[H]  ----------------------------<  239[H]  4.6   |  23  |  3.57[H]    Ca    9.9      12 Apr 2025 06:55  Mg     2.6     04-10    TPro  7.0  /  Alb  4.0  /  TBili  1.4[H]  /  DBili  x   /  AST  25  /  ALT  16  /  AlkPhos  134[H]  04-12      Urinalysis Basic - ( 12 Apr 2025 06:55 )    Color: x / Appearance: x / SG: x / pH: x  Gluc: 239 mg/dL / Ketone: x  / Bili: x / Urobili: x   Blood: x / Protein: x / Nitrite: x   Leuk Esterase: x / RBC: x / WBC x   Sq Epi: x / Non Sq Epi: x / Bacteria: x      CAPILLARY BLOOD GLUCOSE      POCT Blood Glucose.: 198 mg/dL (11 Apr 2025 22:34)  POCT Blood Glucose.: 87 mg/dL (11 Apr 2025 21:35)  POCT Blood Glucose.: 184 mg/dL (11 Apr 2025 17:24)  POCT Blood Glucose.: 291 mg/dL (11 Apr 2025 12:50)      Lower Extremity Physical Exam:  Vasular: DP/PT _n/p, B/L, CFT <_4 seconds B/L, Temperature gradient _wnl, B/L.   Neuro: Epicritic sensation diminished_ to the level of _toes, B/L.  Skin: Bilateral heels fissured xerotic skin.  Dorsal lateral aspect of the left foot positive grade 2 ulceration measuring 4 cm x 3 cm x 0.2 cm in depth.  Positive mixed fibro granular tissue with mild serous drainage.  No active purulence no fluctuance, no malodor no clinical signs of cellulitis--RESOLVED.    RADIOLOGY & ADDITIONAL TESTS:

## 2025-04-11 NOTE — PROGRESS NOTE ADULT - PROBLEM SELECTOR PLAN 1
-Etiology: likely ischemic  -GDMT: Continue hydralazine to 100mg TID, hold for SBP < 90    - Continue spironolactone to 25mg BID, Increase ISDN to 40mg TID and Toprol XL to 100mg QD  -Eventual ARB/ARNi if renal function permits   -Will hold off on SGLT2-i for now given PAD with ulcers and fluctuating creatinine   -Diuretics: Recommend continuing hypertonic saline 3% 150ml/30 min [id number] and would continue bumex gtt at 2mg/hour with close monitoring of electrolytes. Recommend adding a dose of metolzone 5mg QD  -Will check cardiomems readings intermittently. Goal to be established once optimized.  -Strict I/Os and daily weights  -Keep K>4 and Mg>2  -PT and OOB as tolerated

## 2025-04-11 NOTE — PROGRESS NOTE ADULT - ASSESSMENT
Assessment  DMT2: 70y Male with DM T2 with hyperglycemia admitted with SOB,  patient was on oral hypoglycemic agents at home, now on  insulin coverage, blood sugars are improving.  Patient is high risk with high level decision making due to uncontrolled diabetes, has increased risk for complications.   CAD: on medications, monitored, no acute events.  HTN: On antihypertensive medications, monitored, stable.  Myrna Rossi MD  Cell: [phone]  Office: [phone]

## 2025-04-11 NOTE — PROGRESS NOTE ADULT - PROBLEM SELECTOR PLAN 1
Will continue current insulin regimen for now. Will continue monitoring  blood sugars, will Follow up.  Patient counseled for compliance with consistent low carb diet and physical activity as tolerated.  .     Can be discharged on current basal bolus insulins  He may use his home insulin brands  FU outpt for further management

## 2025-04-11 NOTE — PROGRESS NOTE ADULT - SUBJECTIVE AND OBJECTIVE BOX
Chief complaint    Patient is a 70y old  Male who presents with a chief complaint of HF (10 Apr 2025 11:28)   Review of systems  Patient appears comfortable.    Labs and Fingersticks  CAPILLARY BLOOD GLUCOSE      POCT Blood Glucose.: 160 mg/dL (11 Apr 2025 08:18)  POCT Blood Glucose.: 169 mg/dL (10 Apr 2025 21:21)  POCT Blood Glucose.: 222 mg/dL (10 Apr 2025 17:23)  POCT Blood Glucose.: 141 mg/dL (10 Apr 2025 12:57)      Anion Gap: 20 *H* (04-11 @ 06:32)  Anion Gap: 17 (04-10 @ 14:57)  Anion Gap: 20 *H* (04-09 @ 19:28)      Calcium: 10.0 (04-11 @ 06:32)  Calcium: 9.7 (04-10 @ 14:57)  Calcium: 9.4 (04-09 @ 19:28)  Albumin: 4.0 (04-11 @ 06:32)    Alanine Aminotransferase (ALT/SGPT): 14 (04-11 @ 06:32)  Alkaline Phosphatase: 144 *H* (04-11 @ 06:32)  Aspartate Aminotransferase (AST/SGOT): 20 (04-11 @ 06:32)        04-11    132[L]  |  91[L]  |  89[H]  ----------------------------<  164[H]  3.6   |  21[L]  |  3.17[H]    Ca    10.0      11 Apr 2025 06:32  Mg     2.6     04-10    TPro  7.1  /  Alb  4.0  /  TBili  1.7[H]  /  DBili  x   /  AST  20  /  ALT  14  /  AlkPhos  144[H]  04-11    Medications  MEDICATIONS  (STANDING):  AQUAPHOR (petrolatum Ointment) 1 Application(s) Topical two times a day  aspirin  chewable 81 milliGRAM(s) Oral daily  buMETAnide Infusion 2 mG/Hr (10 mL/Hr) IV Continuous <Continuous>  dextrose 5%. 1000 milliLiter(s) (50 mL/Hr) IV Continuous <Continuous>  dextrose 5%. 1000 milliLiter(s) (100 mL/Hr) IV Continuous <Continuous>  dextrose 50% Injectable 25 Gram(s) IV Push once  dextrose 50% Injectable 12.5 Gram(s) IV Push once  dextrose 50% Injectable 25 Gram(s) IV Push once  glucagon  Injectable 1 milliGRAM(s) IntraMuscular once  hydrALAZINE 100 milliGRAM(s) Oral three times a day  insulin glargine Injectable (LANTUS) 9 Unit(s) SubCutaneous at bedtime  insulin lispro (ADMELOG) corrective regimen sliding scale   SubCutaneous three times a day before meals  insulin lispro (ADMELOG) corrective regimen sliding scale   SubCutaneous at bedtime  insulin lispro Injectable (ADMELOG) 6 Unit(s) SubCutaneous three times a day before meals  isosorbide   dinitrate Tablet (ISORDIL) 30 milliGRAM(s) Oral three times a day  metoprolol succinate ER 75 milliGRAM(s) Oral daily  Nephro-daniel 1 Tablet(s) Oral daily  ranolazine 1000 milliGRAM(s) Oral two times a day  rivaroxaban 15 milliGRAM(s) Oral with dinner  spironolactone 25 milliGRAM(s) Oral two times a day      Physical Exam  General: Patient appears comfortable.  Vital Signs Last 12 Hrs  T(F): 97 (04-11-25 @ 06:01), Max: 97 (04-11-25 @ 06:01)  HR: 87 (04-11-25 @ 06:01) (87 - 87)  BP: 147/79 (04-11-25 @ 06:01) (147/79 - 147/79)  BP(mean): --  RR: 18 (04-11-25 @ 06:01) (18 - 18)  SpO2: 95% (04-11-25 @ 06:01) (95% - 95%)  Neck: No palpable thyroid nodules.  CVS: S1S2, No murmurs  Respiratory: No wheezing, no crepitations  GI: Abdomen soft, non tender.    Diagnostics        Radiology:

## 2025-04-11 NOTE — PROGRESS NOTE ADULT - PROBLEM SELECTOR PLAN 1
-Etiology: likely ischemic  -GDMT: Continue hydralazine to 100mg TID, hold for SBP < 90    - Continue spironolactone to 25mg BID, ISDN 30mg TID, Toprol XL 75mg daily.  -Eventual ARB/ARNi if renal function permits  -Will hold off on SGLT2-i for now given PAD with ulcers and fluctuating creatinine   -Diuretics: Recommend continuing hypertonic saline 3% 150ml/30 min [id number] and would continue bumex gtt to 2mg/hour with close monitoring of electrolytes. Recommend adding a dose of metolzone 5mg x 1 today.  -Will check cardiomems readings intermittently. Goal to be established once optimized.  -Strict I/Os and daily weights  -Keep K>4 and Mg>2  - PT -\

## 2025-04-11 NOTE — PROGRESS NOTE ADULT - ASSESSMENT
Assessment/plan:    Left dorsal foot ulceration: Stable, noninfected, present on admission--RESOLVED  Diabetes mellitus/neuropathy  Diabetes rule out PVD    Vascular consultation noted and reviewed.  Continue ammonium lactate lotion to heels daily.  Continue decubitus precautions with use of Z flow boots while in house.  D/C LWC  RECONSULT PODIATRY AS NEEDED

## 2025-04-11 NOTE — PROGRESS NOTE ADULT - SUBJECTIVE AND OBJECTIVE BOX
date of service: 04-11-25 @ 10:27  [facility]  REVIEW OF SYSTEMS:  CONSTITUTIONAL: No fever,  no  weight loss  ENT:  No  tinnitus,   no   vertigo  NECK: No pain or stiffness  RESPIRATORY: No cough, wheezing, chills or hemoptysis;    No Shortness of Breath  CARDIOVASCULAR: No chest pain, palpitations, dizziness  GASTROINTESTINAL: No abdominal or epigastric pain. No nausea, vomiting, or hematemesis; No diarrhea  No melena or hematochezia.  GENITOURINARY: No dysuria, frequency, hematuria, or incontinence  NEUROLOGICAL: No headaches  SKIN: No itching,  no   rash  LYMPH Nodes: No enlarged glands  ENDOCRINE: No heat or cold intolerance  MUSCULOSKELETAL: No joint pain or swelling  PSYCHIATRIC: No depression, anxiety  HEME/LYMPH: No easy bruising, or bleeding gums  ALLERGY AND IMMUNOLOGIC: No hives or eczema	    MEDICATIONS  (STANDING):  AQUAPHOR (petrolatum Ointment) 1 Application(s) Topical two times a day  aspirin  chewable 81 milliGRAM(s) Oral daily  buMETAnide Infusion 2 mG/Hr (10 mL/Hr) IV Continuous <Continuous>  dextrose 5%. 1000 milliLiter(s) (50 mL/Hr) IV Continuous <Continuous>  dextrose 5%. 1000 milliLiter(s) (100 mL/Hr) IV Continuous <Continuous>  dextrose 50% Injectable 25 Gram(s) IV Push once  dextrose 50% Injectable 12.5 Gram(s) IV Push once  dextrose 50% Injectable 25 Gram(s) IV Push once  glucagon  Injectable 1 milliGRAM(s) IntraMuscular once  hydrALAZINE 100 milliGRAM(s) Oral three times a day  insulin glargine Injectable (LANTUS) 9 Unit(s) SubCutaneous at bedtime  insulin lispro (ADMELOG) corrective regimen sliding scale   SubCutaneous three times a day before meals  insulin lispro (ADMELOG) corrective regimen sliding scale   SubCutaneous at bedtime  insulin lispro Injectable (ADMELOG) 6 Unit(s) SubCutaneous three times a day before meals  isosorbide   dinitrate Tablet (ISORDIL) 30 milliGRAM(s) Oral three times a day  metoprolol succinate ER 75 milliGRAM(s) Oral daily  Nephro-daniel 1 Tablet(s) Oral daily  ranolazine 1000 milliGRAM(s) Oral two times a day  rivaroxaban 15 milliGRAM(s) Oral with dinner  spironolactone 25 milliGRAM(s) Oral two times a day    MEDICATIONS  (PRN):  acetaminophen     Tablet .. 650 milliGRAM(s) Oral every 6 hours PRN Temp greater or equal to 38C (100.4F), Mild Pain (1 - 3)  aluminum hydroxide/magnesium hydroxide/simethicone Suspension 30 milliLiter(s) Oral every 4 hours PRN Dyspepsia  dextrose Oral Gel 15 Gram(s) Oral once PRN Blood Glucose LESS THAN 70 milliGRAM(s)/deciliter  melatonin 3 milliGRAM(s) Oral at bedtime PRN Insomnia  ondansetron Injectable 4 milliGRAM(s) IV Push every 8 hours PRN Nausea and/or Vomiting      Vital Signs Last 24 Hrs  T(C): 36.1 (11 Apr 2025 06:01), Max: 36.8 (10 Apr 2025 20:50)  T(F): 97 (11 Apr 2025 06:01), Max: 98.3 (10 Apr 2025 20:50)  HR: 87 (11 Apr 2025 06:01) (52 - 87)  BP: 147/79 (11 Apr 2025 06:01) (125/68 - 147/79)  BP(mean): --  RR: 18 (11 Apr 2025 06:01) (18 - 18)  SpO2: 95% (11 Apr 2025 06:01) (95% - 97%)    Parameters below as of 11 Apr 2025 06:01  Patient On (Oxygen Delivery Method): room air      CAPILLARY BLOOD GLUCOSE      POCT Blood Glucose.: 160 mg/dL (11 Apr 2025 08:18)  POCT Blood Glucose.: 169 mg/dL (10 Apr 2025 21:21)  POCT Blood Glucose.: 222 mg/dL (10 Apr 2025 17:23)  POCT Blood Glucose.: 141 mg/dL (10 Apr 2025 12:57)    I&O's Summary    10 Apr 2025 07:01  -  11 Apr 2025 07:00  --------------------------------------------------------  IN: 1400 mL / OUT: 3250 mL / NET: -1850 mL          Appearance: Normal	  HEENT:   Normal oral mucosa, PERRL, EOMI	  Lymphatic: No lymphadenopathy  Cardiovascular: Normal S1 S2, No JVD  Respiratory: Lungs clear to auscultation	  Gastrointestinal:  Soft, Non-tender, + BS	  Skin: No rash, No ecchymoses	  Extremities:     LABS:    04-11    132[L]  |  91[L]  |  89[H]  ----------------------------<  164[H]  3.6   |  21[L]  |  3.17[H]    Ca    10.0      11 Apr 2025 06:32  Mg     2.6     04-10    TPro  7.1  /  Alb  4.0  /  TBili  1.7[H]  /  DBili  x   /  AST  20  /  ALT  14  /  AlkPhos  144[H]  04-11          Urinalysis Basic - ( 11 Apr 2025 06:32 )    Color: x / Appearance: x / SG: x / pH: x  Gluc: 164 mg/dL / Ketone: x  / Bili: x / Urobili: x   Blood: x / Protein: x / Nitrite: x   Leuk Esterase: x / RBC: x / WBC x   Sq Epi: x / Non Sq Epi: x / Bacteria: x                      Consultant(s) Notes Reviewed:      Care Discussed with Consultants/Other Providers:

## 2025-04-11 NOTE — PROGRESS NOTE ADULT - PROBLEM SELECTOR PLAN 3
-on xarelto 15mg QD and BB  -monitor on tele.  -Consider EP consult when optimized for evaluation for rhythm control.

## 2025-04-11 NOTE — PROGRESS NOTE ADULT - PROBLEM SELECTOR PLAN 2
- hx of multiple stents, dLAD 100% occluded  -on ASA   - continue on increased dose of Ranexa 1000mg BID and Toprol XL 100mg QD.  - afterload agent as above

## 2025-04-11 NOTE — PROGRESS NOTE ADULT - ASSESSMENT
70 M,      h/o   HFrEF, and PMH of CAD (hx of multiple stents, occluded 100% dLAD not amenable to PCI), Afib (on xarelto), T2DM, CKD4, HTN, known PAD s/p recent LLE with PT and peroneal stent placement (1/23),    c/o  sob/ BLE for ADHF ,   L foot wound.    RHC and cardiomems on 4/4 ,  elevated filling pressures and low output for which he was put on a bumex gtt and his hydralazine dose was increased   [facility] 2/4/25: occluded dLAD, D1 100% stenosis  TTE 1/20/25: LVIDd 3.7cm, LVEF 40-45%, RV nml size, borderline reduced RV function, TAPSE 1.1 cm, mildly dilated LA, dilated RA, mild/mod MR, mild TR, PASP 49, IVC nml    rx plan p r card/  heart   failure team   DM   HTN  bumex  infusion,  xarelto, ranexa. toprol, lantus , hydralazine ,asa .  al;dactone  -      70 M,      h/o   HFrEF, and PMH of CAD (hx of multiple stents, occluded 100% dLAD not amenable to PCI     Afib  xarelto),    DM, CKD4, HTN, known PAD s/p recent LLE with PT and peroneal stent placement (1/23),    c/o  sob/ c.c    L foot wound.    RHC and cardiomems on 4/4 ,  elevated filling pressures and low output for which he was put on a bumex gtt and his hydralazine dose was increased   [facility] 2/4/25: occluded dLAD, D1 100% stenosis  TTE 1/20/25: LVIDd 3.7cm, LVEF 40-45%, RV nml size, borderline reduced RV function, TAPSE 1.1 cm, mildly dilated LA, dilated RA, mild/mod MR, mild TR, PASP 49, IVC nml    rx plan p r card/  heart   failure team    DM   HTN  CKD/  c/c  anmeia    c/c  Afib/ xarelto   bumex  infusion,  xarelto, ranexa. toprol, lantus , hydralazine ,asa .  al;dactone  -      70 M,      h/o   HFrEF,,  CAD (hx of multiple stents, occluded 100% dLAD  not amenable to PCI     Afib  xarelto,    DM, CKD4, HTN, known PAD s/p recent LLE with PT and peroneal stent placement (1/23),    c/o  sob/ c.c    L foot wound.     RHC and cardiomems on 4/4 ,  elevated filling pressures and low output for which he was put on a bumex gtt and his hydralazine dose was increased     [facility] [place] 2/4/25: occluded dLAD, D1 100% stenosis    echo, 1/20/25: LVIDd 3.7cm, LVEF 40-45%, RV nml size, borderline reduced RV function, TAPSE 1.1 cm, mildly dilated LA, dilated RA, mild/mod MR, mild TR, PASP 49, IVC nml      rx plan per nan/  heart   failure team    DM   HTN  CKD/  c/c  anmeia    c/c  Afib/ xarelto   bumex  infusion,  xarelto, ranexa. toprol, lantus , hydralazine ,asa .  al;dactone  -

## 2025-04-11 NOTE — PROGRESS NOTE ADULT - SUBJECTIVE AND OBJECTIVE BOX
NEPHROLOGY     Patient seen and examined sitting on bed, reports feeling ok, no new complaints, in no acute distress.     MEDICATIONS  (STANDING):  AQUAPHOR (petrolatum Ointment) 1 Application(s) Topical two times a day  aspirin  chewable 81 milliGRAM(s) Oral daily  buMETAnide Infusion 2 mG/Hr (10 mL/Hr) IV Continuous <Continuous>  dextrose 5%. 1000 milliLiter(s) (100 mL/Hr) IV Continuous <Continuous>  dextrose 5%. 1000 milliLiter(s) (50 mL/Hr) IV Continuous <Continuous>  dextrose 50% Injectable 25 Gram(s) IV Push once  dextrose 50% Injectable 12.5 Gram(s) IV Push once  dextrose 50% Injectable 25 Gram(s) IV Push once  glucagon  Injectable 1 milliGRAM(s) IntraMuscular once  hydrALAZINE 100 milliGRAM(s) Oral three times a day  insulin glargine Injectable (LANTUS) 9 Unit(s) SubCutaneous at bedtime  insulin lispro (ADMELOG) corrective regimen sliding scale   SubCutaneous three times a day before meals  insulin lispro (ADMELOG) corrective regimen sliding scale   SubCutaneous at bedtime  insulin lispro Injectable (ADMELOG) 6 Unit(s) SubCutaneous three times a day before meals  isosorbide   dinitrate Tablet (ISORDIL) 40 milliGRAM(s) Oral three times a day  metolazone 5 milliGRAM(s) Oral daily  Nephro-daniel 1 Tablet(s) Oral daily  ranolazine 1000 milliGRAM(s) Oral two times a day  rivaroxaban 15 milliGRAM(s) Oral with dinner  sodium chloride 3% Bolus 150 milliLiter(s) IV Bolus <User Schedule>  spironolactone 25 milliGRAM(s) Oral two times a day    VITALS:  T(C): , Max: 36.9 (04-11-25 @ 11:52)  T(F): , Max: 98.5 (04-11-25 @ 11:52)  HR: 84 (04-11-25 @ 11:52)  BP: 119/70 (04-11-25 @ 11:52)  BP(mean): 86 (04-11-25 @ 11:52)  RR: 18 (04-11-25 @ 11:52)  SpO2: 96% (04-11-25 @ 11:52)  Wt(kg): --  I and O's:    04-10 @ 07:01  -  04-11 @ 07:00  --------------------------------------------------------  IN: 1400 mL / OUT: 3250 mL / NET: -1850 mL    PHYSICAL EXAM:  Constitutional: NAD, Alert  HEENT: NCAT, MMM  Neck: Supple, No JVD  Respiratory: CTA-b/l  Cardiovascular: RRR s1s2, no m/r/g  Gastrointestinal: BS+, soft, NT/ND  Extremities: 1+ b/l LE edema  Neurological: no focal deficits; strength grossly intact  Back: no CVAT b/l  Skin: No rashes, no nevi    LABS:    04-11    132[L]  |  91[L]  |  89[H]  ----------------------------<  164[H]  3.6   |  21[L]  |  3.17[H]    Ca    10.0      11 Apr 2025 06:32  Mg     2.6     04-10    TPro  7.1  /  Alb  4.0  /  TBili  1.7[H]  /  DBili  x   /  AST  20  /  ALT  14  /  AlkPhos  144[H]  04-11

## 2025-04-12 NOTE — PROGRESS NOTE ADULT - ASSESSMENT
Assessment  DMT2: 70y Male with DM T2 with hyperglycemia admitted with SOB,  patient was on oral hypoglycemic agents at home, now on  insulin coverage, blood sugars are fluctuating due to inconsistent food intake..  Patient is high risk with high level decision making due to uncontrolled diabetes, has increased risk for complications.   CAD: on medications, monitored, no acute events.  HTN: On antihypertensive medications, monitored, stable.  Myrna Rossi MD  Cell: [phone]  Office: [phone]

## 2025-04-12 NOTE — PROGRESS NOTE ADULT - ASSESSMENT
70 M,      h/o   HFrEF,,  CAD (hx of multiple stents, occluded 100% dLAD  not amenable to PCI     Afib  xarelto,    DM, CKD4, HTN, known PAD s/p recent LLE with PT and peroneal stent placement (1/23),    c/o  sob/ c.c    L foot wound.     RHC and cardiomems on 4/4 ,  elevated filling pressures and low output for which he was put on a bumex gtt and his hydralazine dose was increased     [facility] 2/4/25: occluded dLAD, D1 100% stenosis    echo, 1/20/25: LVIDd 3.7cm, LVEF 40-45%, RV nml size, borderline reduced RV function, TAPSE 1.1 cm, mildly dilated LA, dilated RA, mild/mod MR, mild TR, PASP 49, IVC nml      rx plan per nan/  heart   failure team    DM   HTN  CKD/  c/c  anmeia    c/c  Afib/ xarelto   bumex  infusion,  xarelto, ranexa. toprol, lantus , hydralazine ,asa .  al;dactone    less  sob,  crt  3.5. on iv bumex  infusion  -

## 2025-04-12 NOTE — PROGRESS NOTE ADULT - SUBJECTIVE AND OBJECTIVE BOX
Chief complaint    Patient is a 70y old  Male who presents with a chief complaint of HF (11 Apr 2025 10:14)   Review of systems  Patient appears comfortable.    Labs and Fingersticks  CAPILLARY BLOOD GLUCOSE      POCT Blood Glucose.: 234 mg/dL (12 Apr 2025 08:40)  POCT Blood Glucose.: 198 mg/dL (11 Apr 2025 22:34)  POCT Blood Glucose.: 87 mg/dL (11 Apr 2025 21:35)  POCT Blood Glucose.: 184 mg/dL (11 Apr 2025 17:24)  POCT Blood Glucose.: 291 mg/dL (11 Apr 2025 12:50)      Anion Gap: 20 *H* (04-12 @ 06:55)  Anion Gap: 18 *H* (04-12 @ 06:55)  Anion Gap: 21 *H* (04-11 @ 17:52)  Anion Gap: 20 *H* (04-11 @ 06:32)  Anion Gap: 17 (04-10 @ 14:57)      Calcium: 9.9 (04-12 @ 06:55)  Calcium: 9.9 (04-12 @ 06:55)  Calcium: 10.3 (04-11 @ 17:52)  Calcium: 10.0 (04-11 @ 06:32)  Calcium: 9.7 (04-10 @ 14:57)  Albumin: 4.0 (04-12 @ 06:55)  Albumin: 4.0 (04-11 @ 06:32)    Alanine Aminotransferase (ALT/SGPT): 16 (04-12 @ 06:55)  Alanine Aminotransferase (ALT/SGPT): 14 (04-11 @ 06:32)  Alkaline Phosphatase: 134 *H* (04-12 @ 06:55)  Alkaline Phosphatase: 144 *H* (04-11 @ 06:32)  Aspartate Aminotransferase (AST/SGOT): 25 (04-12 @ 06:55)  Aspartate Aminotransferase (AST/SGOT): 20 (04-11 @ 06:32)        04-12    134[L]  |  91[L]  |  100[H]  ----------------------------<  239[H]  4.6   |  23  |  3.57[H]    Ca    9.9      12 Apr 2025 06:55  Mg     2.6     04-10    TPro  7.0  /  Alb  4.0  /  TBili  1.4[H]  /  DBili  x   /  AST  25  /  ALT  16  /  AlkPhos  134[H]  04-12    Medications  MEDICATIONS  (STANDING):  AQUAPHOR (petrolatum Ointment) 1 Application(s) Topical two times a day  aspirin  chewable 81 milliGRAM(s) Oral daily  buMETAnide Infusion 2 mG/Hr (10 mL/Hr) IV Continuous <Continuous>  dextrose 5%. 1000 milliLiter(s) (100 mL/Hr) IV Continuous <Continuous>  dextrose 5%. 1000 milliLiter(s) (50 mL/Hr) IV Continuous <Continuous>  dextrose 50% Injectable 25 Gram(s) IV Push once  dextrose 50% Injectable 12.5 Gram(s) IV Push once  dextrose 50% Injectable 25 Gram(s) IV Push once  glucagon  Injectable 1 milliGRAM(s) IntraMuscular once  hydrALAZINE 100 milliGRAM(s) Oral three times a day  insulin glargine Injectable (LANTUS) 9 Unit(s) SubCutaneous at bedtime  insulin lispro (ADMELOG) corrective regimen sliding scale   SubCutaneous three times a day before meals  insulin lispro (ADMELOG) corrective regimen sliding scale   SubCutaneous at bedtime  insulin lispro Injectable (ADMELOG) 6 Unit(s) SubCutaneous three times a day before meals  isosorbide   dinitrate Tablet (ISORDIL) 40 milliGRAM(s) Oral three times a day  metolazone 5 milliGRAM(s) Oral daily  metoprolol succinate  milliGRAM(s) Oral daily  Nephro-daniel 1 Tablet(s) Oral daily  ranolazine 1000 milliGRAM(s) Oral two times a day  rivaroxaban 15 milliGRAM(s) Oral with dinner  senna 2 Tablet(s) Oral at bedtime  spironolactone 25 milliGRAM(s) Oral two times a day      Physical Exam  General: Patient appears comfortable.  Vital Signs Last 12 Hrs  T(F): 97.4 (04-12-25 @ 04:26), Max: 97.4 (04-12-25 @ 04:26)  HR: 84 (04-12-25 @ 05:00) (84 - 84)  BP: 147/68 (04-12-25 @ 05:00) (91/52 - 147/68)  BP(mean): --  RR: 18 (04-12-25 @ 04:26) (18 - 18)  SpO2: 93% (04-12-25 @ 04:26) (93% - 93%)  Neck: No palpable thyroid nodules.  CVS: S1S2, No murmurs  Respiratory: No wheezing, no crepitations  GI: Abdomen soft, non tender.    Diagnostics        Radiology:

## 2025-04-12 NOTE — PROGRESS NOTE ADULT - ASSESSMENT
IMPRESSION: 70M w/ HTN, DM2, CAD, HFrEF, and CKD4, 3/26/25 p/w ADHF    (1)CKD - 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 - renal fxn trending higher   (2)Hyponatremia - stable   (3)CV - acute on chronic HFrEF - improved relative to admission, with Bumex gtt    RECOMMEND:  (1)Would consider reducing bumex gtt and d/c metolazone 5mg po daily  (2)Dose new meds for GFR 15-20ml/min  (3)BMP+Mg at least daily while admitted    D/w Dr. Walton and ACP     Delilah Foy, [facility]  [phone]

## 2025-04-12 NOTE — PROGRESS NOTE ADULT - ASSESSMENT
71 y/o WM, with hx of HFmrEF, and PMH of CAD (hx of multiple stents, occluded 100% dLAD not amenable to PCI), Afib (on Xarelto), T2DM, CKD4, HTN, known PAD s/p recent LLE with PT and peroneal stent placement (1/23), is here for SOB and BLE for ADHF and also for L foot wound. 4/4/25 RHC/cardiomems (formal report pending) RA 28, RV 85/6, PA 78/32 (47), PCWP 30, CO/CI: 2.72/1.67  - CardioMEMS placed 4/4/25  - Tele A flutter 70 - 80 good rate control  - PAD 36 on 4/11 & 4/10  36 on 4/9 39 on 4/8  - await next PAD  - cont afterload reduction  - Per HF team: hypertonic saline 3% 150ml/30 min R2zymht and would continue bumex gtt to 2mg/hour with close monitoring of electrolytes.  - Spironolactone 25 BID, ISDN 30 TID Toprol XL 75 QD BUSTAMANTE 100 TID = GDMT  - Holding off SGLT2 and ACE ARB ARNI given ABRRY - consider introduction if OK with Nephrology and HF team   - appreciate Nephrology and HF team follow up   - if difficulty to optimize continues in light of low CO - ? potential transplant candidate - await HF team f/u  - D/w pt

## 2025-04-12 NOTE — PROGRESS NOTE ADULT - SUBJECTIVE AND OBJECTIVE BOX
NEPHROLOGY     Patient seen and examined sitting on bed, comfortable on room air, reports of nausea and dry heaves last night, denies pain, no sob at present, in no acute distress.     MEDICATIONS  (STANDING):  AQUAPHOR (petrolatum Ointment) 1 Application(s) Topical two times a day  aspirin  chewable 81 milliGRAM(s) Oral daily  buMETAnide Infusion 2 mG/Hr (10 mL/Hr) IV Continuous <Continuous>  dextrose 5%. 1000 milliLiter(s) (100 mL/Hr) IV Continuous <Continuous>  dextrose 5%. 1000 milliLiter(s) (50 mL/Hr) IV Continuous <Continuous>  dextrose 50% Injectable 25 Gram(s) IV Push once  dextrose 50% Injectable 12.5 Gram(s) IV Push once  dextrose 50% Injectable 25 Gram(s) IV Push once  glucagon  Injectable 1 milliGRAM(s) IntraMuscular once  hydrALAZINE 100 milliGRAM(s) Oral three times a day  insulin glargine Injectable (LANTUS) 9 Unit(s) SubCutaneous at bedtime  insulin lispro (ADMELOG) corrective regimen sliding scale   SubCutaneous three times a day before meals  insulin lispro (ADMELOG) corrective regimen sliding scale   SubCutaneous at bedtime  insulin lispro Injectable (ADMELOG) 6 Unit(s) SubCutaneous three times a day before meals  isosorbide   dinitrate Tablet (ISORDIL) 40 milliGRAM(s) Oral three times a day  metolazone 5 milliGRAM(s) Oral daily  metoprolol succinate  milliGRAM(s) Oral daily  Nephro-daniel 1 Tablet(s) Oral daily  ranolazine 1000 milliGRAM(s) Oral two times a day  rivaroxaban 15 milliGRAM(s) Oral with dinner  senna 2 Tablet(s) Oral at bedtime  spironolactone 25 milliGRAM(s) Oral two times a day    VITALS:  T(C): , Max: 36.9 (04-11-25 @ 11:52)  T(F): , Max: 98.5 (04-11-25 @ 11:52)  HR: 80 (04-12-25 @ 11:23)  BP: 144/81 (04-12-25 @ 11:23)  BP(mean): 86 (04-11-25 @ 11:52)  RR: 18 (04-12-25 @ 11:23)  SpO2: 94% (04-12-25 @ 11:23)    I and O's:    04-11 @ 07:01  -  04-12 @ 07:00  --------------------------------------------------------  IN: 860 mL / OUT: 2900 mL / NET: -2040 mL    PHYSICAL EXAM:  Constitutional: NAD, Alert  HEENT: NCAT, MMM  Neck: Supple, No JVD  Respiratory: CTA-b/l  Cardiovascular: RRR s1s2, no m/r/g  Gastrointestinal: BS+, soft, NT/ND  Extremities: 1+ b/l LE edema  Neurological: no focal deficits; strength grossly intact  Back: no CVAT b/l  Skin: No rashes, no nevi    LABS:    04-12    134[L]  |  91[L]  |  100[H]  ----------------------------<  239[H]  4.6   |  23  |  3.57[H]    Ca    9.9      12 Apr 2025 06:55  Mg     2.6     04-10    TPro  7.0  /  Alb  4.0  /  TBili  1.4[H]  /  DBili  x   /  AST  25  /  ALT  16  /  AlkPhos  134[H]  04-12

## 2025-04-12 NOTE — PROGRESS NOTE ADULT - SUBJECTIVE AND OBJECTIVE BOX
SUBJECTIVE:  NO CP no SOB    MEDICATIONS:  buMETAnide Infusion 2 mG/Hr IV Continuous <Continuous>  hydrALAZINE 100 milliGRAM(s) Oral three times a day  isosorbide   dinitrate Tablet (ISORDIL) 40 milliGRAM(s) Oral three times a day  metolazone 5 milliGRAM(s) Oral daily  metoprolol succinate  milliGRAM(s) Oral daily  ranolazine 1000 milliGRAM(s) Oral two times a day  spironolactone 25 milliGRAM(s) Oral two times a day        acetaminophen     Tablet .. 650 milliGRAM(s) Oral every 6 hours PRN  melatonin 3 milliGRAM(s) Oral at bedtime PRN  ondansetron Injectable 4 milliGRAM(s) IV Push every 8 hours PRN    aluminum hydroxide/magnesium hydroxide/simethicone Suspension 30 milliLiter(s) Oral every 4 hours PRN  bisacodyl 5 milliGRAM(s) Oral every 12 hours PRN  senna 2 Tablet(s) Oral at bedtime    dextrose 50% Injectable 25 Gram(s) IV Push once  dextrose 50% Injectable 12.5 Gram(s) IV Push once  dextrose 50% Injectable 25 Gram(s) IV Push once  dextrose Oral Gel 15 Gram(s) Oral once PRN  glucagon  Injectable 1 milliGRAM(s) IntraMuscular once  insulin glargine Injectable (LANTUS) 9 Unit(s) SubCutaneous at bedtime  insulin lispro (ADMELOG) corrective regimen sliding scale   SubCutaneous three times a day before meals  insulin lispro (ADMELOG) corrective regimen sliding scale   SubCutaneous at bedtime  insulin lispro Injectable (ADMELOG) 6 Unit(s) SubCutaneous three times a day before meals    AQUAPHOR (petrolatum Ointment) 1 Application(s) Topical two times a day  aspirin  chewable 81 milliGRAM(s) Oral daily  dextrose 5%. 1000 milliLiter(s) IV Continuous <Continuous>  dextrose 5%. 1000 milliLiter(s) IV Continuous <Continuous>  Nephro-daniel 1 Tablet(s) Oral daily  rivaroxaban 15 milliGRAM(s) Oral with dinner      REVIEW OF SYSTEMS:    CONSTITUTIONAL: No fever, weight loss, or fatigue  EYES: No eye pain, visual disturbances, or discharge  NECK: No pain or stiffness  RESPIRATORY: No cough, wheezing, chills or hemoptysis; No Shortness of Breath  CARDIOVASCULAR: No chest pain, palpitations, dizziness, or leg swelling  GASTROINTESTINAL: No abdominal or epigastric pain. No nausea, vomiting, or hematemesis; No diarrhea or constipation. No melena or hematochezia.  GENITOURINARY: No dysuria, frequency, hematuria, or incontinence  NEUROLOGICAL: No headaches, memory loss, loss of strength, numbness, or tremors  SKIN: No itching, burning, rashes, or lesions   LYMPH Nodes: No enlarged glands  MUSCULOSKELETAL: No joint pain or swelling; No muscle, back, or extremity pain  All other review of systems are negative.  	  [ ] Unable to obtain    PHYSICAL EXAM:  T(C): 36.6 (04-12-25 @ 11:23), Max: 36.6 (04-12-25 @ 11:23)  HR: 80 (04-12-25 @ 11:23) (74 - 84)  BP: 144/81 (04-12-25 @ 11:23) (91/52 - 147/68)  RR: 18 (04-12-25 @ 11:23) (18 - 18)  SpO2: 94% (04-12-25 @ 11:23) (93% - 97%)  Wt(kg): --  I&O's Summary    11 Apr 2025 07:01  -  12 Apr 2025 07:00  --------------------------------------------------------  IN: 860 mL / OUT: 2900 mL / NET: -2040 mL          PHYSICAL EXAM    Appearance: Normal	  HEENT:   Normal oral mucosa, PERRL, EOMI	  NECK: Soft and supple, No LAD, No JVD  Cardiovascular: Regular Rate and Rhythm, Normal S1 S2, No murmurs, No clicks, gallops or rubs  Respiratory: Lungs clear to auscultation	  Gastrointestinal:  Soft, Non-tender, + BS	  Skin: No rashes, No ecchymoses, No cyanosis  Neurologic: Non-focal  Extremities: No clubbing, cyanosis 1+ edema  Vascular: Peripheral pulses palpable 2+ bilaterally    TELE A flutter 70 - 90    LABS:	 	    04-12    134[L]  |  91[L]  |  100[H]  ----------------------------<  239[H]  4.6   |  23  |  3.57[H]    Ca    9.9      12 Apr 2025 06:55  Mg     2.6     04-10    TPro  7.0  /  Alb  4.0  /  TBili  1.4[H]  /  DBili  x   /  AST  25  /  ALT  16  /  AlkPhos  134[H]  04-12

## 2025-04-12 NOTE — PROGRESS NOTE ADULT - SUBJECTIVE AND OBJECTIVE BOX
date of service: 04-12-25 @ 11:27  [facility]  REVIEW OF SYSTEMS:  CONSTITUTIONAL: No fever,  no  weight loss  ENT:  No  tinnitus,   no   vertigo  NECK: No pain or stiffness  RESPIRATORY: No cough, wheezing, chills or hemoptysis;    No Shortness of Breath  CARDIOVASCULAR: No chest pain, palpitations, dizziness  GASTROINTESTINAL: No abdominal or epigastric pain. No nausea, vomiting, or hematemesis; No diarrhea  No melena or hematochezia.  GENITOURINARY: No dysuria, frequency, hematuria, or incontinence  NEUROLOGICAL: No headaches  SKIN: No itching,  no   rash  LYMPH Nodes: No enlarged glands  ENDOCRINE: No heat or cold intolerance  MUSCULOSKELETAL: No joint pain or swelling  PSYCHIATRIC: No depression, anxiety  HEME/LYMPH: No easy bruising, or bleeding gums  ALLERGY AND IMMUNOLOGIC: No hives or eczema	    MEDICATIONS  (STANDING):  AQUAPHOR (petrolatum Ointment) 1 Application(s) Topical two times a day  aspirin  chewable 81 milliGRAM(s) Oral daily  buMETAnide Infusion 2 mG/Hr (10 mL/Hr) IV Continuous <Continuous>  dextrose 5%. 1000 milliLiter(s) (100 mL/Hr) IV Continuous <Continuous>  dextrose 5%. 1000 milliLiter(s) (50 mL/Hr) IV Continuous <Continuous>  dextrose 50% Injectable 25 Gram(s) IV Push once  dextrose 50% Injectable 12.5 Gram(s) IV Push once  dextrose 50% Injectable 25 Gram(s) IV Push once  glucagon  Injectable 1 milliGRAM(s) IntraMuscular once  hydrALAZINE 100 milliGRAM(s) Oral three times a day  insulin glargine Injectable (LANTUS) 9 Unit(s) SubCutaneous at bedtime  insulin lispro (ADMELOG) corrective regimen sliding scale   SubCutaneous three times a day before meals  insulin lispro (ADMELOG) corrective regimen sliding scale   SubCutaneous at bedtime  insulin lispro Injectable (ADMELOG) 6 Unit(s) SubCutaneous three times a day before meals  isosorbide   dinitrate Tablet (ISORDIL) 40 milliGRAM(s) Oral three times a day  metolazone 5 milliGRAM(s) Oral daily  metoprolol succinate  milliGRAM(s) Oral daily  Nephro-daniel 1 Tablet(s) Oral daily  polyethylene glycol 3350 17 Gram(s) Oral once  ranolazine 1000 milliGRAM(s) Oral two times a day  rivaroxaban 15 milliGRAM(s) Oral with dinner  senna 2 Tablet(s) Oral at bedtime  spironolactone 25 milliGRAM(s) Oral two times a day    MEDICATIONS  (PRN):  acetaminophen     Tablet .. 650 milliGRAM(s) Oral every 6 hours PRN Temp greater or equal to 38C (100.4F), Mild Pain (1 - 3)  aluminum hydroxide/magnesium hydroxide/simethicone Suspension 30 milliLiter(s) Oral every 4 hours PRN Dyspepsia  bisacodyl 5 milliGRAM(s) Oral every 12 hours PRN Constipation  dextrose Oral Gel 15 Gram(s) Oral once PRN Blood Glucose LESS THAN 70 milliGRAM(s)/deciliter  melatonin 3 milliGRAM(s) Oral at bedtime PRN Insomnia  ondansetron Injectable 4 milliGRAM(s) IV Push every 8 hours PRN Nausea and/or Vomiting      Vital Signs Last 24 Hrs  T(C): 36.6 (12 Apr 2025 11:23), Max: 36.9 (11 Apr 2025 11:52)  T(F): 97.8 (12 Apr 2025 11:23), Max: 98.5 (11 Apr 2025 11:52)  HR: 80 (12 Apr 2025 11:23) (74 - 84)  BP: 144/81 (12 Apr 2025 11:23) (91/52 - 147/68)  BP(mean): 86 (11 Apr 2025 11:52) (86 - 86)  RR: 18 (12 Apr 2025 11:23) (18 - 18)  SpO2: 94% (12 Apr 2025 11:23) (93% - 97%)    Parameters below as of 12 Apr 2025 11:23  Patient On (Oxygen Delivery Method): room air      CAPILLARY BLOOD GLUCOSE      POCT Blood Glucose.: 234 mg/dL (12 Apr 2025 08:40)  POCT Blood Glucose.: 198 mg/dL (11 Apr 2025 22:34)  POCT Blood Glucose.: 87 mg/dL (11 Apr 2025 21:35)  POCT Blood Glucose.: 184 mg/dL (11 Apr 2025 17:24)  POCT Blood Glucose.: 291 mg/dL (11 Apr 2025 12:50)    I&O's Summary    11 Apr 2025 07:01  -  12 Apr 2025 07:00  --------------------------------------------------------  IN: 860 mL / OUT: 2900 mL / NET: -2040 mL          Appearance: Normal	  HEENT:   Normal oral mucosa, PERRL, EOMI	  Lymphatic: No lymphadenopathy  Cardiovascular: Normal S1 S2, No JVD  Respiratory: Lungs clear to auscultation	  Gastrointestinal:  Soft, Non-tender, + BS	  Skin: No rash, No ecchymoses	  Extremities:     LABS:    04-12    134[L]  |  91[L]  |  100[H]  ----------------------------<  239[H]  4.6   |  23  |  3.57[H]    Ca    9.9      12 Apr 2025 06:55  Mg     2.6     04-10    TPro  7.0  /  Alb  4.0  /  TBili  1.4[H]  /  DBili  x   /  AST  25  /  ALT  16  /  AlkPhos  134[H]  04-12          Urinalysis Basic - ( 12 Apr 2025 06:55 )    Color: x / Appearance: x / SG: x / pH: x  Gluc: 239 mg/dL / Ketone: x  / Bili: x / Urobili: x   Blood: x / Protein: x / Nitrite: x   Leuk Esterase: x / RBC: x / WBC x   Sq Epi: x / Non Sq Epi: x / Bacteria: x                      Consultant(s) Notes Reviewed:      Care Discussed with Consultants/Other Providers:

## 2025-04-13 NOTE — PROGRESS NOTE ADULT - SUBJECTIVE AND OBJECTIVE BOX
NEPHROLOGY     Patient seen and examined resting in bed, on room air, reports of nausea earlier, noted with episode of vomiting and CP, at present denies pain, no sob, feels tired, in no acute distress.     MEDICATIONS  (STANDING):  AQUAPHOR (petrolatum Ointment) 1 Application(s) Topical two times a day  aspirin  chewable 81 milliGRAM(s) Oral daily  buMETAnide Infusion 2 mG/Hr (10 mL/Hr) IV Continuous <Continuous>  dextrose 5%. 1000 milliLiter(s) (100 mL/Hr) IV Continuous <Continuous>  dextrose 5%. 1000 milliLiter(s) (50 mL/Hr) IV Continuous <Continuous>  dextrose 50% Injectable 25 Gram(s) IV Push once  dextrose 50% Injectable 12.5 Gram(s) IV Push once  dextrose 50% Injectable 25 Gram(s) IV Push once  glucagon  Injectable 1 milliGRAM(s) IntraMuscular once  hydrALAZINE 100 milliGRAM(s) Oral three times a day  insulin glargine Injectable (LANTUS) 9 Unit(s) SubCutaneous at bedtime  insulin lispro (ADMELOG) corrective regimen sliding scale   SubCutaneous three times a day before meals  insulin lispro (ADMELOG) corrective regimen sliding scale   SubCutaneous at bedtime  insulin lispro Injectable (ADMELOG) 6 Unit(s) SubCutaneous three times a day before meals  isosorbide   dinitrate Tablet (ISORDIL) 40 milliGRAM(s) Oral three times a day  metolazone 5 milliGRAM(s) Oral daily  metoprolol succinate  milliGRAM(s) Oral daily  Nephro-daniel 1 Tablet(s) Oral daily  ranolazine 1000 milliGRAM(s) Oral two times a day  rivaroxaban 15 milliGRAM(s) Oral with dinner  senna 2 Tablet(s) Oral at bedtime  spironolactone 25 milliGRAM(s) Oral two times a day    VITALS:  T(C): , Max: 36.7 (04-13-25 @ 03:50)  T(F): , Max: 98 (04-13-25 @ 03:50)  HR: 68 (04-13-25 @ 06:40)  BP: 95/62 (04-13-25 @ 06:40)  RR: 18 (04-13-25 @ 06:40)  SpO2: 95% (04-13-25 @ 06:40)    I and O's:    04-12 @ 07:01  -  04-13 @ 07:00  --------------------------------------------------------  IN: 960 mL / OUT: 2650 mL / NET: -1690 mL    PHYSICAL EXAM:  Constitutional: NAD, Alert  HEENT: NCAT, MMM  Neck: Supple, No JVD  Respiratory: CTA-b/l  Cardiovascular: RRR s1s2, no m/r/g  Gastrointestinal: BS+, soft, NT/ND  Extremities: 1+ b/l LE edema  Neurological: no focal deficits; strength grossly intact  Back: no CVAT b/l  Skin: No rashes, no nevi    LABS:                        11.6   13.43 )-----------( 346      ( 13 Apr 2025 06:25 )             34.3     04-13    129[L]  |  86[L]  |  101[H]  ----------------------------<  189[H]  4.1   |  22  |  4.02[H]    Ca    9.8      13 Apr 2025 06:25  Phos  5.8     04-13  Mg     2.6     04-13    TPro  7.0  /  Alb  4.0  /  TBili  1.4[H]  /  DBili  x   /  AST  25  /  ALT  16  /  AlkPhos  134[H]  04-12

## 2025-04-13 NOTE — PROGRESS NOTE ADULT - SUBJECTIVE AND OBJECTIVE BOX
date of service: 04-13-25 @ 12:32  [facility]  REVIEW OF SYSTEMS:  CONSTITUTIONAL: No fever,  no  weight loss  ENT:  No  tinnitus,   no   vertigo  NECK: No pain or stiffness  RESPIRATORY: No cough, wheezing, chills or hemoptysis;    No Shortness of Breath  CARDIOVASCULAR: No chest pain, palpitations, dizziness  GASTROINTESTINAL: No abdominal or epigastric pain. No nausea, vomiting, or hematemesis; No diarrhea  No melena or hematochezia.  GENITOURINARY: No dysuria, frequency, hematuria, or incontinence  NEUROLOGICAL: No headaches  SKIN: No itching,  no   rash  LYMPH Nodes: No enlarged glands  ENDOCRINE: No heat or cold intolerance  MUSCULOSKELETAL: No joint pain or swelling  PSYCHIATRIC: No depression, anxiety  HEME/LYMPH: No easy bruising, or bleeding gums  ALLERGY AND IMMUNOLOGIC: No hives or eczema	    MEDICATIONS  (STANDING):  AQUAPHOR (petrolatum Ointment) 1 Application(s) Topical two times a day  aspirin  chewable 81 milliGRAM(s) Oral daily  buMETAnide Infusion 2 mG/Hr (10 mL/Hr) IV Continuous <Continuous>  dextrose 5%. 1000 milliLiter(s) (100 mL/Hr) IV Continuous <Continuous>  dextrose 5%. 1000 milliLiter(s) (50 mL/Hr) IV Continuous <Continuous>  dextrose 50% Injectable 25 Gram(s) IV Push once  dextrose 50% Injectable 12.5 Gram(s) IV Push once  dextrose 50% Injectable 25 Gram(s) IV Push once  glucagon  Injectable 1 milliGRAM(s) IntraMuscular once  hydrALAZINE 100 milliGRAM(s) Oral three times a day  insulin glargine Injectable (LANTUS) 9 Unit(s) SubCutaneous at bedtime  insulin lispro (ADMELOG) corrective regimen sliding scale   SubCutaneous three times a day before meals  insulin lispro (ADMELOG) corrective regimen sliding scale   SubCutaneous at bedtime  insulin lispro Injectable (ADMELOG) 6 Unit(s) SubCutaneous three times a day before meals  isosorbide   dinitrate Tablet (ISORDIL) 40 milliGRAM(s) Oral three times a day  metolazone 5 milliGRAM(s) Oral daily  metoprolol succinate  milliGRAM(s) Oral daily  Nephro-daniel 1 Tablet(s) Oral daily  ranolazine 1000 milliGRAM(s) Oral two times a day  rivaroxaban 15 milliGRAM(s) Oral with dinner  senna 2 Tablet(s) Oral at bedtime  spironolactone 25 milliGRAM(s) Oral two times a day    MEDICATIONS  (PRN):  acetaminophen     Tablet .. 650 milliGRAM(s) Oral every 6 hours PRN Temp greater or equal to 38C (100.4F), Mild Pain (1 - 3)  aluminum hydroxide/magnesium hydroxide/simethicone Suspension 30 milliLiter(s) Oral every 4 hours PRN Dyspepsia  bisacodyl 5 milliGRAM(s) Oral every 12 hours PRN Constipation  dextrose Oral Gel 15 Gram(s) Oral once PRN Blood Glucose LESS THAN 70 milliGRAM(s)/deciliter  melatonin 3 milliGRAM(s) Oral at bedtime PRN Insomnia  ondansetron Injectable 4 milliGRAM(s) IV Push every 8 hours PRN Nausea and/or Vomiting      Vital Signs Last 24 Hrs  T(C): 36.4 (13 Apr 2025 11:58), Max: 36.7 (13 Apr 2025 03:50)  T(F): 97.6 (13 Apr 2025 11:58), Max: 98 (13 Apr 2025 03:50)  HR: 71 (13 Apr 2025 11:58) (68 - 82)  BP: 154/76 (13 Apr 2025 11:58) (95/62 - 154/76)  BP(mean): --  RR: 18 (13 Apr 2025 11:58) (18 - 18)  SpO2: 97% (13 Apr 2025 11:58) (94% - 97%)    Parameters below as of 13 Apr 2025 11:58  Patient On (Oxygen Delivery Method): room air      CAPILLARY BLOOD GLUCOSE      POCT Blood Glucose.: 110 mg/dL (13 Apr 2025 12:31)  POCT Blood Glucose.: 204 mg/dL (13 Apr 2025 08:37)  POCT Blood Glucose.: 286 mg/dL (12 Apr 2025 21:29)  POCT Blood Glucose.: 84 mg/dL (12 Apr 2025 17:40)  POCT Blood Glucose.: 64 mg/dL (12 Apr 2025 17:26)  POCT Blood Glucose.: 215 mg/dL (12 Apr 2025 12:48)    I&O's Summary    12 Apr 2025 07:01  -  13 Apr 2025 07:00  --------------------------------------------------------  IN: 960 mL / OUT: 2650 mL / NET: -1690 mL    13 Apr 2025 07:01  -  13 Apr 2025 12:32  --------------------------------------------------------  IN: 240 mL / OUT: 500 mL / NET: -260 mL          Appearance: Normal	  HEENT:   Normal oral mucosa, PERRL, EOMI	  Lymphatic: No lymphadenopathy  Cardiovascular: Normal S1 S2, No JVD  Respiratory: Lungs clear to auscultation	  Gastrointestinal:  Soft, Non-tender, + BS	  Skin: No rash, No ecchymoses	  Extremities:     LABS:                        11.6   13.43 )-----------( 346      ( 13 Apr 2025 06:25 )             34.3     04-13    129[L]  |  86[L]  |  101[H]  ----------------------------<  189[H]  4.1   |  22  |  4.02[H]    Ca    9.8      13 Apr 2025 06:25  Phos  5.8     04-13  Mg     2.6     04-13    TPro  7.0  /  Alb  4.0  /  TBili  1.4[H]  /  DBili  x   /  AST  25  /  ALT  16  /  AlkPhos  134[H]  04-12          Urinalysis Basic - ( 13 Apr 2025 06:25 )    Color: x / Appearance: x / SG: x / pH: x  Gluc: 189 mg/dL / Ketone: x  / Bili: x / Urobili: x   Blood: x / Protein: x / Nitrite: x   Leuk Esterase: x / RBC: x / WBC x   Sq Epi: x / Non Sq Epi: x / Bacteria: x                      Consultant(s) Notes Reviewed:      Care Discussed with Consultants/Other Providers:

## 2025-04-13 NOTE — PROGRESS NOTE ADULT - SUBJECTIVE AND OBJECTIVE BOX
SUBJECTIVE:  NO CP no SOB Had an episode of vomiting earlier He does not feel sick, HE does not have any abdominal pain   Non toxic appearing  	  MEDICATIONS:  buMETAnide Infusion 2 mG/Hr IV Continuous <Continuous>  hydrALAZINE 100 milliGRAM(s) Oral three times a day  isosorbide   dinitrate Tablet (ISORDIL) 40 milliGRAM(s) Oral three times a day  metolazone 5 milliGRAM(s) Oral daily  metoprolol succinate  milliGRAM(s) Oral daily  ranolazine 1000 milliGRAM(s) Oral two times a day  spironolactone 25 milliGRAM(s) Oral two times a day        acetaminophen     Tablet .. 650 milliGRAM(s) Oral every 6 hours PRN  melatonin 3 milliGRAM(s) Oral at bedtime PRN  ondansetron Injectable 4 milliGRAM(s) IV Push every 8 hours PRN    aluminum hydroxide/magnesium hydroxide/simethicone Suspension 30 milliLiter(s) Oral every 4 hours PRN  bisacodyl 5 milliGRAM(s) Oral every 12 hours PRN  senna 2 Tablet(s) Oral at bedtime    dextrose 50% Injectable 25 Gram(s) IV Push once  dextrose 50% Injectable 12.5 Gram(s) IV Push once  dextrose 50% Injectable 25 Gram(s) IV Push once  dextrose Oral Gel 15 Gram(s) Oral once PRN  glucagon  Injectable 1 milliGRAM(s) IntraMuscular once  insulin glargine Injectable (LANTUS) 9 Unit(s) SubCutaneous at bedtime  insulin lispro (ADMELOG) corrective regimen sliding scale   SubCutaneous three times a day before meals  insulin lispro (ADMELOG) corrective regimen sliding scale   SubCutaneous at bedtime  insulin lispro Injectable (ADMELOG) 6 Unit(s) SubCutaneous three times a day before meals    AQUAPHOR (petrolatum Ointment) 1 Application(s) Topical two times a day  aspirin  chewable 81 milliGRAM(s) Oral daily  dextrose 5%. 1000 milliLiter(s) IV Continuous <Continuous>  dextrose 5%. 1000 milliLiter(s) IV Continuous <Continuous>  Nephro-daniel 1 Tablet(s) Oral daily  rivaroxaban 15 milliGRAM(s) Oral with dinner      REVIEW OF SYSTEMS:    CONSTITUTIONAL: No fever, weight loss, or fatigue  EYES: No eye pain, visual disturbances, or discharge  NECK: No pain or stiffness  RESPIRATORY: No cough, wheezing, chills or hemoptysis; No Shortness of Breath  CARDIOVASCULAR: No chest pain, palpitations, dizziness, or leg swelling  GASTROINTESTINAL: No abdominal or epigastric pain. No nausea, or hematemesis; No diarrhea or constipation. No melena or hematochezia. + vomiting   GENITOURINARY: No dysuria, frequency, hematuria, or incontinence  NEUROLOGICAL: No headaches, memory loss, loss of strength, numbness, or tremors  SKIN: No itching, burning, rashes, or lesions   LYMPH Nodes: No enlarged glands  MUSCULOSKELETAL: No joint pain or swelling; No muscle, back, or extremity pain  All other review of systems are negative.  	  [ ] Unable to obtain    PHYSICAL EXAM:  T(C): 36.4 (04-13-25 @ 06:40), Max: 36.7 (04-13-25 @ 03:50)  HR: 68 (04-13-25 @ 06:40) (68 - 82)  BP: 95/62 (04-13-25 @ 06:40) (95/62 - 144/81)  RR: 18 (04-13-25 @ 06:40) (18 - 18)  SpO2: 95% (04-13-25 @ 06:40) (94% - 95%)  Wt(kg): --  I&O's Summary    12 Apr 2025 07:01  -  13 Apr 2025 07:00  --------------------------------------------------------  IN: 960 mL / OUT: 2650 mL / NET: -1690 mL          PHYSICAL EXAM    Appearance: Normal	  HEENT:   Normal oral mucosa, PERRL, EOMI	  NECK: Soft and supple, No LAD, No JVD  Cardiovascular: Regular Rate and Rhythm, Normal S1 S2, No murmurs, No clicks, gallops or rubs  Respiratory: Lungs clear to auscultation	  Gastrointestinal:  Soft, Non-tender, + BS	  Skin: No rashes, No ecchymoses, No cyanosis  Neurologic: Non-focal  Extremities: No clubbing, cyanosis or edema  Vascular: Peripheral pulses palpable 2+ bilaterally    TELEMETRY: 	    A flutter 75, overnight 60-80    LABS:	 	                          11.6   13.43 )-----------( 346      ( 13 Apr 2025 06:25 )             34.3     04-13    129[L]  |  86[L]  |  101[H]  ----------------------------<  189[H]  4.1   |  22  |  4.02[H]    Ca    9.8      13 Apr 2025 06:25  Phos  5.8     04-13  Mg     2.6     04-13    TPro  7.0  /  Alb  4.0  /  TBili  1.4[H]  /  DBili  x   /  AST  25  /  ALT  16  /  AlkPhos  134[H]  04-12

## 2025-04-13 NOTE — CHART NOTE - NSCHARTNOTEFT_GEN_A_CORE
[facility] chart note    CC; Chest pain/Chroic in nature  Notified by RN that patient with c/o CP,   Evaluated the pt at bedside, patient sitting in bed, appears anxious, endorses that he is having mid chest pain 5-6/10, pain non radiating  Denies HA, dizziness, SOB, palpations, Orthopnea,     Vital Signs Last 24 Hrs  T(C): 36.7 (13 Apr 2025 03:50), Max: 36.7 (13 Apr 2025 03:50)  T(F): 98 (13 Apr 2025 03:50), Max: 98 (13 Apr 2025 03:50)  HR: 74 (13 Apr 2025 03:50) (74 - 82)  BP: 140/80 (13 Apr 2025 03:50) (136/86 - 144/81)  BP(mean): --  RR: 18 (13 Apr 2025 03:50) (18 - 18)  SpO2: 94% (13 Apr 2025 03:50) (94% - 95%)    Parameters below as of 13 Apr 2025 03:50  Patient On (Oxygen Delivery Method): room air    < from: Cardiac Catheterization (02.04.25 @ 15:25) >      Occluded distal LAD.  Remainder of anatomy is unchanged from prior  angiogram.  Medical therapy and maximize antianginals    < end of copied text >    A/P    69 y/o WM, with hx of HFmrEF, and PMH of CAD (hx of multiple stents, occluded 100% dLAD not amenable to PCI), Afib (on Xarelto), T2DM, CKD4, HTN, known PAD s/p recent LLE with PT and peroneal stent placement (1/23), is here for SOB and BLE for ADHF and also for L foot wound. 4/4/25 RHC/cardiomems (formal report pending) RA 28, RV 85/6, PA 78/32 (47), PCWP 30, CO/CI: 2.72/1.67  On bumex gtt afterload reduction  He is on GDMT, Spironolactone 25 BID, ISDN 30 TID Toprol XL 75 QD BUSTAMANTE 100 TID     # Distal LAD occlusion/ Chronic CP  Pt's Cp likely from dLAD occlusion/micro vascular disease   He is on anti anginals, Ranexa 1000mg po bid and isordil 40mg po tid  Pt received am dose of anti anginals now  12 lead ekg pending, will review once done  His Bp soft, SBP in 90's , if pain persisting, and SBp improved, can try NTG sl, which usually  helps him  Consider iv morphine if CP not subsiding to relieve anxiety and CP/once BPO improves  Will order iv tylenol 1 gram iv x1 dose now  He is diuresing well  Will sign out to day team    Everton GARCIA-BC  Department of Medicine  Spectra# [id number]

## 2025-04-13 NOTE — PROGRESS NOTE ADULT - ASSESSMENT
IMPRESSION: 70M w/ HTN, DM2, CAD, HFrEF, and CKD4, 3/26/25 p/w ADHF    (1)CKD - 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   (2)BARRY - due to diuresis - renal fxn trending higher   (2)Hyponatremia - Na+ lower   (3)CV - acute on chronic HFrEF - improved relative to admission, with Bumex gtt    RECOMMEND:  (1)Discontinue bumex gtt  (2)Discontinue metolazone 5mg po daily  (3)Would give gentle IVF, NS at 50cc/hr x 10  (4)Dose new meds for GFR 10-15ml/min  (5)BMP+Mg at least daily while admitted    D/w Dr. Walton and PETEY Foy, [facility]  [phone]

## 2025-04-13 NOTE — PROGRESS NOTE ADULT - SUBJECTIVE AND OBJECTIVE BOX
Chief complaint  Patient is a 70y old  Male who presents with a chief complaint of chf (13 Apr 2025 12:32)         Labs and Fingersticks  CAPILLARY BLOOD GLUCOSE      POCT Blood Glucose.: 110 mg/dL (13 Apr 2025 12:31)  POCT Blood Glucose.: 204 mg/dL (13 Apr 2025 08:37)  POCT Blood Glucose.: 286 mg/dL (12 Apr 2025 21:29)  POCT Blood Glucose.: 84 mg/dL (12 Apr 2025 17:40)  POCT Blood Glucose.: 64 mg/dL (12 Apr 2025 17:26)      Anion Gap: 21 *H* (04-13 @ 06:25)  Anion Gap: 20 *H* (04-12 @ 06:55)  Anion Gap: 18 *H* (04-12 @ 06:55)  Anion Gap: 21 *H* (04-11 @ 17:52)      Calcium: 9.8 (04-13 @ 06:25)  Calcium: 9.9 (04-12 @ 06:55)  Calcium: 9.9 (04-12 @ 06:55)  Calcium: 10.3 (04-11 @ 17:52)  Albumin: 4.0 (04-12 @ 06:55)    Alanine Aminotransferase (ALT/SGPT): 16 (04-12 @ 06:55)  Alkaline Phosphatase: 134 *H* (04-12 @ 06:55)  Aspartate Aminotransferase (AST/SGOT): 25 (04-12 @ 06:55)        04-13    129[L]  |  86[L]  |  101[H]  ----------------------------<  189[H]  4.1   |  22  |  4.02[H]    Ca    9.8      13 Apr 2025 06:25  Phos  5.8     04-13  Mg     2.6     04-13    TPro  7.0  /  Alb  4.0  /  TBili  1.4[H]  /  DBili  x   /  AST  25  /  ALT  16  /  AlkPhos  134[H]  04-12                        11.6   13.43 )-----------( 346      ( 13 Apr 2025 06:25 )             34.3     Medications  MEDICATIONS  (STANDING):  AQUAPHOR (petrolatum Ointment) 1 Application(s) Topical two times a day  aspirin  chewable 81 milliGRAM(s) Oral daily  buMETAnide Infusion 2 mG/Hr (10 mL/Hr) IV Continuous <Continuous>  dextrose 5%. 1000 milliLiter(s) (100 mL/Hr) IV Continuous <Continuous>  dextrose 5%. 1000 milliLiter(s) (50 mL/Hr) IV Continuous <Continuous>  dextrose 50% Injectable 25 Gram(s) IV Push once  dextrose 50% Injectable 12.5 Gram(s) IV Push once  dextrose 50% Injectable 25 Gram(s) IV Push once  glucagon  Injectable 1 milliGRAM(s) IntraMuscular once  hydrALAZINE 100 milliGRAM(s) Oral three times a day  insulin glargine Injectable (LANTUS) 9 Unit(s) SubCutaneous at bedtime  insulin lispro (ADMELOG) corrective regimen sliding scale   SubCutaneous three times a day before meals  insulin lispro (ADMELOG) corrective regimen sliding scale   SubCutaneous at bedtime  insulin lispro Injectable (ADMELOG) 6 Unit(s) SubCutaneous three times a day before meals  isosorbide   dinitrate Tablet (ISORDIL) 40 milliGRAM(s) Oral three times a day  metolazone 5 milliGRAM(s) Oral daily  metoprolol succinate  milliGRAM(s) Oral daily  Nephro-adniel 1 Tablet(s) Oral daily  ranolazine 1000 milliGRAM(s) Oral two times a day  rivaroxaban 15 milliGRAM(s) Oral with dinner  senna 2 Tablet(s) Oral at bedtime  sodium chloride 3% Bolus 150 milliLiter(s) IV Bolus once  spironolactone 25 milliGRAM(s) Oral two times a day      Physical Exam  General: Patient comfortable in bed   Vital Signs Last 12 Hrs  T(F): 97.6 (04-13-25 @ 11:58), Max: 97.6 (04-13-25 @ 11:58)  HR: 71 (04-13-25 @ 11:58) (68 - 71)  BP: 154/76 (04-13-25 @ 11:58) (95/62 - 154/76)  BP(mean): --  RR: 18 (04-13-25 @ 11:58) (18 - 18)  SpO2: 97% (04-13-25 @ 11:58) (95% - 97%)    CVS: S1S2   Respiratory: No wheezing, no crepitations  GI: Abdomen soft, bowel sounds positive  Musculoskeletal:  moves all extremities  : Voiding        Chief complaint  Patient is a 70y old  Male who presents with a chief complaint of chf (13 Apr 2025 12:32)         Labs and Fingersticks  CAPILLARY BLOOD GLUCOSE      POCT Blood Glucose.: 110 mg/dL (13 Apr 2025 12:31)  POCT Blood Glucose.: 204 mg/dL (13 Apr 2025 08:37)  POCT Blood Glucose.: 286 mg/dL (12 Apr 2025 21:29)  POCT Blood Glucose.: 84 mg/dL (12 Apr 2025 17:40)  POCT Blood Glucose.: 64 mg/dL (12 Apr 2025 17:26)      Anion Gap: 21 *H* (04-13 @ 06:25)  Anion Gap: 20 *H* (04-12 @ 06:55)  Anion Gap: 18 *H* (04-12 @ 06:55)  Anion Gap: 21 *H* (04-11 @ 17:52)      Calcium: 9.8 (04-13 @ 06:25)  Calcium: 9.9 (04-12 @ 06:55)  Calcium: 9.9 (04-12 @ 06:55)  Calcium: 10.3 (04-11 @ 17:52)  Albumin: 4.0 (04-12 @ 06:55)    Alanine Aminotransferase (ALT/SGPT): 16 (04-12 @ 06:55)  Alkaline Phosphatase: 134 *H* (04-12 @ 06:55)  Aspartate Aminotransferase (AST/SGOT): 25 (04-12 @ 06:55)        04-13    129[L]  |  86[L]  |  101[H]  ----------------------------<  189[H]  4.1   |  22  |  4.02[H]    Ca    9.8      13 Apr 2025 06:25  Phos  5.8     04-13  Mg     2.6     04-13    TPro  7.0  /  Alb  4.0  /  TBili  1.4[H]  /  DBili  x   /  AST  25  /  ALT  16  /  AlkPhos  134[H]  04-12                        11.6   13.43 )-----------( 346      ( 13 Apr 2025 06:25 )             34.3     Medications  MEDICATIONS  (STANDING):  AQUAPHOR (petrolatum Ointment) 1 Application(s) Topical two times a day  aspirin  chewable 81 milliGRAM(s) Oral daily  buMETAnide Infusion 2 mG/Hr (10 mL/Hr) IV Continuous <Continuous>  dextrose 5%. 1000 milliLiter(s) (100 mL/Hr) IV Continuous <Continuous>  dextrose 5%. 1000 milliLiter(s) (50 mL/Hr) IV Continuous <Continuous>  dextrose 50% Injectable 25 Gram(s) IV Push once  dextrose 50% Injectable 12.5 Gram(s) IV Push once  dextrose 50% Injectable 25 Gram(s) IV Push once  glucagon  Injectable 1 milliGRAM(s) IntraMuscular once  hydrALAZINE 100 milliGRAM(s) Oral three times a day  insulin glargine Injectable (LANTUS) 9 Unit(s) SubCutaneous at bedtime  insulin lispro (ADMELOG) corrective regimen sliding scale   SubCutaneous three times a day before meals  insulin lispro (ADMELOG) corrective regimen sliding scale   SubCutaneous at bedtime  insulin lispro Injectable (ADMELOG) 6 Unit(s) SubCutaneous three times a day before meals  isosorbide   dinitrate Tablet (ISORDIL) 40 milliGRAM(s) Oral three times a day  metolazone 5 milliGRAM(s) Oral daily  metoprolol succinate  milliGRAM(s) Oral daily  Nephro-daniel 1 Tablet(s) Oral daily  ranolazine 1000 milliGRAM(s) Oral two times a day  rivaroxaban 15 milliGRAM(s) Oral with dinner  senna 2 Tablet(s) Oral at bedtime  sodium chloride 3% Bolus 150 milliLiter(s) IV Bolus once  spironolactone 25 milliGRAM(s) Oral two times a day      Physical Exam  General: Patient comfortable in bed   Vital Signs Last 12 Hrs  T(F): 97.6 (04-13-25 @ 11:58), Max: 97.6 (04-13-25 @ 11:58)  HR: 71 (04-13-25 @ 11:58) (68 - 71)  BP: 154/76 (04-13-25 @ 11:58) (95/62 - 154/76)  BP(mean): --  RR: 18 (04-13-25 @ 11:58) (18 - 18)  SpO2: 97% (04-13-25 @ 11:58) (95% - 97%)    CVS: S1S2   Respiratory: No wheezing, no crepitations  GI: Abdomen soft, bowel sounds positive  Musculoskeletal:  moves all extremities  : Voiding

## 2025-04-13 NOTE — PROVIDER CONTACT NOTE (OTHER) - REASON
chest pain
pt c/o chest pain
pt complaint of chest pressure & pain
patient complaining of chest pain s/p eating and lying down
pt left foot wound bleeding

## 2025-04-13 NOTE — CHART NOTE - NSCHARTNOTEFT_GEN_A_CORE
Discuss case with Dr. Ronal King.     -4/4/25 RHC/cardiomems (formal report pending) RA 28, RV 85/6, PA 78/32 (47), PCWP 30, CO/CI: 2.72/1.67  Performed cardioMEMS reading, PAD is 36 today.   4/11 PAD 36  4/10 PAD 36  4/9 PAD 36  4/8 PAD 39    Still appears volume up on examination and concern of rising BUN/Cr 101/4.02 despite making about -1-2L of UOP    Plan as per Dr. King:  -c/w bumex at 2mg/hr, HTS 3% 150cc Q8 and metolazone 5mg QD  -continue to monitor strict I/Os, daily weights, replenish K>4 and Mg>2 and BMPs  -will reengage with nephrology given worsening renal function for discussion of HD  -discuss plans with the medicine ACP Isabel and ACP nephrology Delilah of plan above

## 2025-04-13 NOTE — PROVIDER CONTACT NOTE (OTHER) - ACTION/TREATMENT ORDERED:
provider notified, no intervention at this time, plan of care on going.
administer one tablet of nitro
provider notified, came at bedside to assess the pt, stat ekg ordered and done, and stat iv Tylenol ordered and given with relief, pain and pressure decreased. plan of care on going.
pt due for 10mg of isosorbide dinitrate. EKG completed. NP to assess pt at bedside. will continue to monitor
provider made aware. to give IV tylenol for pain, to reassess need for nitroglycerin if no relief. plan of care ongoing.

## 2025-04-13 NOTE — PROVIDER CONTACT NOTE (OTHER) - SITUATION
patient complaining of Left sided 5/10 chest pain. has had hx of similar chest pain on admission
patient complaining of chest pain s/p eating and lying down
pt left foot wound bleeding
pt c/o 8/10 non radiating chest pain. pt states the pain in generalized across his chest that just started. pt requesting nitro which he takes at home
pt complaint of chest pressure & pain

## 2025-04-13 NOTE — PROVIDER CONTACT NOTE (OTHER) - ASSESSMENT
patient vitals attached, patient otherwise stable
Patient A&Ox4, pt complaint of chest pressure & pain. pt stated its same pain and pressure that he has been having, not new pain or pressure. Patient on 2L NC for comfort. vitals in flow sheet
aox4 /84, T 97.8, AF 90s, o2 98%
Patient A&Ox4, pt left foot wound bleeding, Foot dressing changed, bleeding precaution maintained, Vitals stable. wound consult in place
pt is AO4, VSS. no SOB or other discomforts. on RA. pt requesting nitroglycerin for chest pain. has standing order for Ranexa and was just given previously with AM meds.

## 2025-04-13 NOTE — PROGRESS NOTE ADULT - ASSESSMENT
70 M,      h/o   HFrEF,,  CAD (hx of multiple stents, occluded 100% dLAD  not amenable to PCI     Afib  xarelto,    DM, CKD4, HTN, known PAD s/p recent LLE with PT and peroneal stent placement (1/23),    c/o  sob/ c.c    L foot wound.     RHC and cardiomems on 4/4 ,  elevated filling pressures and low output for which he was put on a bumex gtt and his hydralazine dose was increased     C 2/4/25: occluded dLAD, D1 100% stenosis    echo, 1/20/25: LVIDd 3.7cm, LVEF 40-45%, RV nml size, borderline reduced RV function, TAPSE 1.1 cm, mildly dilated LA, dilated RA, mild/mod MR, mild TR, PASP 49, IVC nml      rx plan per card/  heart   failure team    DM   HTN  CKD/  c/c  anmeia    c/c  Afib/ xarelto   bumex  infusion,  xarelto, ranexa. toprol, lantus , hydralazine ,asa .  al;dactone    less  sob,   on iv bumex  infusion   crt  4,  f/p  by  renal  and  chf  team   -

## 2025-04-13 NOTE — PROGRESS NOTE ADULT - PROBLEM SELECTOR PLAN 1
Will continue current insulin regimen for now. Will continue monitoring  blood sugars, will Follow up.  Patient counseled for compliance with consistent low carb diet and physical activity as tolerated.      Can be discharged on current basal bolus insulins  He may use his home insulin brands  FU outpt for further management

## 2025-04-13 NOTE — PROVIDER CONTACT NOTE (OTHER) - BACKGROUND
patient admitted with chf exacerbation, and other disorders of the soft tissue
71 yo M with a PMH of CAD, HTN, DM, CKD, CHF w/ reduced EF, referred to ED by Cardiology Dr. Alfred Moon for worsening shortness of breath and bilateral leg swelling.
69 yo M with a PMH of CAD, HTN, DM, CKD, CHF w/ reduced EF, referred to ED by Cardiology Dr. Alfred Moon for worsening shortness of breath and bilateral leg swelling
Dx: CHF exacerbation   CKD  chronic anginal pain
here for CHF exacerbation on bumex gtt

## 2025-04-13 NOTE — PROGRESS NOTE ADULT - ASSESSMENT
Assessment  DMT2: 70y Male with DM T2 with hyperglycemia admitted with SOB,  patient was on oral hypoglycemic agents at home, now on  insulin coverage, blood sugars are fluctuating due to inconsistent food intake.  Patient is high risk with high level decision making due to uncontrolled diabetes, has increased risk for complications.   CAD: on medications, monitored, no acute events.  HTN: On antihypertensive medications, monitored, stable.          Discussed plan and management with Dr Flo Jamil NP - TEAMS  Myrna Rossi MD  Cell: [phone]  Office: [phone]    Assessment  DMT2: 70y Male with DM T2 with hyperglycemia admitted with SOB,  patient was on oral hypoglycemic agents at home, now on  insulin coverage, blood sugars are fluctuating due to inconsistent food intake.  Patient is high risk with high level decision making due to uncontrolled diabetes, has increased risk for complications.   CAD: on medications, monitored, no acute events.  HTN: On antihypertensive medications, monitored, stable.          Discussed plan and management with Dr Flo Jamil NP - TEAMS  Myrna Rossi MD  Cell: [phone]  Office: [phone]

## 2025-04-14 NOTE — PROGRESS NOTE ADULT - TIME BILLING
pt , acp , wife
pt, cards
pt ,acp a, HF
- Review of notes/records, telemetry, vital signs and daily labs.   - General and cardiovascular physical examination.  - Generation of cardiovascular treatment plan.  - Coordination of care with primary team.

## 2025-04-14 NOTE — PROGRESS NOTE ADULT - SUBJECTIVE AND OBJECTIVE BOX
SUBJECTIVE: HF and renal follow ups noted.   	  MEDICATIONS:  buMETAnide Infusion 2 mG/Hr IV Continuous <Continuous>  hydrALAZINE 100 milliGRAM(s) Oral three times a day  isosorbide   dinitrate Tablet (ISORDIL) 40 milliGRAM(s) Oral three times a day  metolazone 5 milliGRAM(s) Oral daily  metoprolol succinate  milliGRAM(s) Oral daily  ranolazine 1000 milliGRAM(s) Oral two times a day  spironolactone 25 milliGRAM(s) Oral two times a day  acetaminophen     Tablet .. 650 milliGRAM(s) Oral every 6 hours PRN  melatonin 3 milliGRAM(s) Oral at bedtime PRN  ondansetron Injectable 4 milliGRAM(s) IV Push every 8 hours PRN  aluminum hydroxide/magnesium hydroxide/simethicone Suspension 30 milliLiter(s) Oral every 4 hours PRN  bisacodyl 5 milliGRAM(s) Oral every 12 hours PRN  senna 2 Tablet(s) Oral at bedtime  dextrose 50% Injectable 25 Gram(s) IV Push once  dextrose 50% Injectable 12.5 Gram(s) IV Push once  dextrose 50% Injectable 25 Gram(s) IV Push once  dextrose Oral Gel 15 Gram(s) Oral once PRN  glucagon  Injectable 1 milliGRAM(s) IntraMuscular once  insulin glargine Injectable (LANTUS) 9 Unit(s) SubCutaneous at bedtime  insulin lispro (ADMELOG) corrective regimen sliding scale   SubCutaneous three times a day before meals  insulin lispro (ADMELOG) corrective regimen sliding scale   SubCutaneous at bedtime  insulin lispro Injectable (ADMELOG) 6 Unit(s) SubCutaneous three times a day before meals  AQUAPHOR (petrolatum Ointment) 1 Application(s) Topical two times a day  aspirin  chewable 81 milliGRAM(s) Oral daily  dextrose 5%. 1000 milliLiter(s) IV Continuous <Continuous>  dextrose 5%. 1000 milliLiter(s) IV Continuous <Continuous>  Nephro-daniel 1 Tablet(s) Oral daily  rivaroxaban 15 milliGRAM(s) Oral with dinner      REVIEW OF SYSTEMS:    CONSTITUTIONAL: No fever, weight loss, or fatigue  EYES: No eye pain, visual disturbances, or discharge  NECK: No pain or stiffness  RESPIRATORY: No cough, wheezing, chills or hemoptysis; No Shortness of Breath  CARDIOVASCULAR: No chest pain, palpitations, dizziness, or leg swelling  GASTROINTESTINAL: No abdominal or epigastric pain. No nausea, vomiting, or hematemesis; No diarrhea or constipation. No melena or hematochezia.  GENITOURINARY: No dysuria, frequency, hematuria, or incontinence  NEUROLOGICAL: No headaches, memory loss, loss of strength, numbness, or tremors  SKIN: No itching, burning, rashes, or lesions   LYMPH Nodes: No enlarged glands  MUSCULOSKELETAL: No joint pain or swelling; No muscle, back, or extremity pain  All other review of systems are negative.  	    PHYSICAL EXAM:  T(C): 36.5 (04-14-25 @ 04:00), Max: 36.5 (04-14-25 @ 04:00)  HR: 96 (04-14-25 @ 04:00) (71 - 96)  BP: 125/82 (04-14-25 @ 04:00) (114/67 - 154/76)  RR: 18 (04-14-25 @ 04:00) (18 - 18)  SpO2: 94% (04-14-25 @ 04:00) (94% - 97%)  Wt(kg): --  I&O's Summary    13 Apr 2025 07:01  -  14 Apr 2025 07:00  --------------------------------------------------------  IN: 840 mL / OUT: 2750 mL / NET: -1910 mL          PHYSICAL EXAM    Appearance: Normal	  HEENT:   Normal oral mucosa, PERRL, EOMI	  NECK: Soft and supple, No LAD, No JVD  Cardiovascular: Regular Rate and Rhythm, Normal S1 S2, No murmurs, No clicks, gallops or rubs  Respiratory: Lungs clear to auscultation	  Extremities: No clubbing, cyanosis or edema               11.6   13.43 )-----------( 346      ( 13 Apr 2025 06:25 )             34.3     04-14    129[L]  |  81[L]  |  121[H]  ----------------------------<  142[H]  3.2[L]   |  23  |  4.40[H]    Ca    9.5      14 Apr 2025 06:39  Phos  5.8     04-13  Mg     2.6     04-13

## 2025-04-14 NOTE — PROGRESS NOTE ADULT - NS ATTEND AMEND GEN_ALL_CORE FT
Chart, labs, vitals, radiology reviewed. Above H&P reviewed and edited where appropriate. Agree with history and physical exam. Agree with assessment and plan. I reviewed the overnight course of events and discussed the care with the patient/ family. All the decisions in assessment and plan are made by me.
70M Stage C ICM with recurrent HFH p/w acute on chronic decompensated systolic HF. RHC and MEMS placement 4/4, significantly volume overloaded. Low output state, but no evidence of overt end-organ hypoperfusion, so managed with preload and afterload reduction. Modest progress over the weekend.    Cardiac Studies:  4/4/25 RHC/cardiomems (formal report pending) RA 28, RV 85/6, PA 78/32 (47), PCWP 30, CO/CI: 2.72/1.67  C 2/4/25: occluded dLAD, D1 100% stenosis  -1/29/24: patient prior stents in the LM, LAD and Cx, severe disease dLAD in a smaller caliber sized vessel unchanged from prior [facility] that is not amenable to PCI   TTE 1/20/25: LVIDd 3.7cm, LVEF 40-45%, RV nml size, borderline reduced RV function, TAPSE 1.1 cm, mildly dilated LA, dilated RA, mild/mod MR, mild TR, PASP 49, IVC nml    Recommendations:  - Toprol to 100, hydral to 50q8, Isordil 30q8, Ald to 25 BID. Hold SGLT2i initiation for now  - Diuretics: Bumex 2 IV bolus and drip to 2  - interrogate MEMS tomorrow  - c/w Xarelto for Afib
Chart, labs, vitals, radiology reviewed. Above H&P reviewed and edited where appropriate. Agree with history and physical exam. Agree with assessment and plan. I reviewed the overnight course of events and discussed the care with the patient/ family. All the decisions in assessment and plan are made by me.
Chart, labs, vitals, radiology reviewed. Above H&P reviewed and edited where appropriate. Agree with history and physical exam. Agree with assessment and plan. I reviewed the overnight course of events and discussed the care with the patient/ family. All the decisions in assessment and plan are made by me.
70M Stage C ICM with recurrent HFH p/w acute on chronic decompensated systolic HF. RHC and MEMS placement 4/4, significantly volume overloaded. Low output state, but no evidence of overt end-organ hypoperfusion, so managed with preload and afterload reduction. Modest progress over the weekend.    Cardiac Studies:  4/4/25 RHC/cardiomems (formal report pending) RA 28, RV 85/6, PA 78/32 (47), PCWP 30, CO/CI: 2.72/1.67  C 2/4/25: occluded dLAD, D1 100% stenosis  -1/29/24: patient prior stents in the LM, LAD and Cx, severe disease dLAD in a smaller caliber sized vessel unchanged from prior [facility] that is not amenable to PCI   TTE 1/20/25: LVIDd 3.7cm, LVEF 40-45%, RV nml size, borderline reduced RV function, TAPSE 1.1 cm, mildly dilated LA, dilated RA, mild/mod MR, mild TR, PASP 49, IVC nml    Recommendations:  - Toprol 100, hydral to 75q8, Isordil 30q8, Ald to 25 BID. Hold SGLT2i initiation for now  - Diuretics: Bumex @2, add hypertonic NS 150cc 3% x2 doses today  - interrogated MEMS today- 39  - c/w Xarelto for Afib.
70M Stage C ICM with recurrent HFH p/w acute on chronic decompensated systolic HF. RHC and MEMS placement 4/4, significantly volume overloaded. Low output state, but no evidence of overt end-organ hypoperfusion, so managed with preload and afterload reduction. Making progress. With some ongoing stable angina.    Cardiac Studies:  4/4/25 RHC/cardiomems (formal report pending) RA 28, RV 85/6, PA 78/32 (47), PCWP 30, CO/CI: 2.72/1.67  C 2/4/25: occluded dLAD, D1 100% stenosis  -1/29/24: patient prior stents in the LM, LAD and Cx, severe disease dLAD in a smaller caliber sized vessel unchanged from prior [facility] that is not amenable to PCI   TTE 1/20/25: LVIDd 3.7cm, LVEF 40-45%, RV nml size, borderline reduced RV function, TAPSE 1.1 cm, mildly dilated LA, dilated RA, mild/mod MR, mild TR, PASP 49, IVC nml    Recommendations:  - Toprol 100, hydral to 100q8, Isordil 30q8, Ald to 25 BID. Hold SGLT2i initiation for now  - Diuretics: Bumex @2, hypertonic NS 150cc 3% x3 doses today  - interrogated MEMS today- 36  - c/w Xarelto for Afib  - would increase Ranexa to 1000 BID
70M Stage C ICM with recurrent HFH p/w acute on chronic decompensated systolic HF. RHC and MEMS placement 4/4, significantly volume overloaded. Low output state, but no evidence of overt end-organ hypoperfusion, so managed with preload and afterload reduction. Making progress. With some ongoing stable angina, resolved on higher dose Ranexa.    Cardiac Studies:  4/4/25 RHC/cardiomems (formal report pending) RA 28, RV 85/6, PA 78/32 (47), PCWP 30, CO/CI: 2.72/1.67  [facility] 2/4/25: occluded dLAD, D1 100% stenosis  -1/29/24: patient prior stents in the LM, LAD and Cx, severe disease dLAD in a smaller caliber sized vessel unchanged from prior [facility] that is not amenable to PCI   TTE 1/20/25: LVIDd 3.7cm, LVEF 40-45%, RV nml size, borderline reduced RV function, TAPSE 1.1 cm, mildly dilated LA, dilated RA, mild/mod MR, mild TR, PASP 49, IVC nml    Recommendations:  - Toprol 100, hydral 100q8, Isordil 30q8, Ald 25 BID. Hold SGLT2i initiation for now  - Diuretics: Bumex @2, hypertonic 3%NS 150cc q8h, add metolazone 5 daily  - interrogated MEMS today- 36 still  - c/w Xarelto for Afib, will consider DCCV when euvolemic  - Ranexa 1000 BID
70M Stage C ICM with recurrent HFH p/w acute on chronic decompensated systolic HF. RHC and MEMS placement 4/4, significantly volume overloaded. Low output state, but no evidence of overt end-organ hypoperfusion, so managed with preload and afterload reduction. Making progress albeit slow diuresis. With some ongoing stable angina, resolved on higher dose Ranexa.    Cardiac Studies:  4/4/25 RHC/cardiomems (formal report pending) RA 28, RV 85/6, PA 78/32 (47), PCWP 30, CO/CI: 2.72/1.67  [facility] 2/4/25: occluded dLAD, D1 100% stenosis  -1/29/24: patient prior stents in the LM, LAD and Cx, severe disease dLAD in a smaller caliber sized vessel unchanged from prior [facility] that is not amenable to PCI   TTE 1/20/25: LVIDd 3.7cm, LVEF 40-45%, RV nml size, borderline reduced RV function, TAPSE 1.1 cm, mildly dilated LA, dilated RA, mild/mod MR, mild TR, PASP 49, IVC nml    Recommendations:  - Toprol to 100, hydral 100q8, Isordil to 40q8, Ald 25 BID. Hold SGLT2i initiation for now  - Diuretics: Bumex @2, hypertonic 3%NS 150cc q8h, metolazone 5 daily  - will interrogate MEMS today  - c/w Xarelto for Afib, will consider DCCV when euvolemic  - Ranexa 1000 BID

## 2025-04-14 NOTE — PROGRESS NOTE ADULT - PROVIDER SPECIALTY LIST ADULT
Cardiology
Endocrinology
Endocrinology
Heart Failure
Internal Medicine
Nephrology
Podiatry
Vascular Surgery
Cardiology
Endocrinology
Internal Medicine
Nephrology
Vascular Surgery
Cardiology
Internal Medicine
Intervent Cardiology
Nephrology
Podiatry
Podiatry
Heart Failure
Internal Medicine
Endocrinology
Heart Failure
Endocrinology
Heart Failure
Internal Medicine
Endocrinology
Heart Failure
Internal Medicine
Endocrinology
Heart Failure
Heart Failure
Internal Medicine

## 2025-04-14 NOTE — PROGRESS NOTE ADULT - ASSESSMENT
#h/o ashd s/p multiple pci, last pci dLAD 4/17/24 by Dr ILDA Dominguez, known severe inoperable CAD, not amenable to intervention.  #HTN  #DM (Dr Reyes)  #ckd (Dr Christophe Walton): renal follow up this am noted; bun/cr HIGHER at121/4/40.  #s/p cea by Dr Calle  # hfref exacerbation, now with MEMS,  noted  depressed LV function and CO, with severe pulm HTN and fluid overload ( RA 26  PCWP 30) at insertion. Repeat MEMs reading pending. Clinically feels improved.   #AF , remains on xarelto.   #s/p Left foot infection   #s/p RSV   #Hypokalemia: K+ low 3.2; please replenish.  Renal follow up this am noted; agree with at least temporarily holding diuretics and gentle hydration in order to hopefully improve renal function and avoid HD.   #h/o ashd s/p multiple pci, last pci dLAD 4/17/24 by Dr ILDA Dominguez, known severe inoperable CAD, not amenable to intervention.  #HTN  #DM (Dr Reyes)  #ckd (Dr Christophe Walton): renal follow up this am noted; bun/cr HIGHER at121/4/40.  #s/p cea by Dr Calle  # hfref exacerbation, now with MEMS,  noted  depressed LV function and CO, with severe pulm HTN and fluid overload ( RA 26  PCWP 30) at insertion. Repeat MEMs reading pending. Clinically feels improved.   #AF , remains on xarelto.   #s/p Left foot infection   #s/p RSV   #Hypokalemia: K+ low 3.2; please replenish.  Hyponatremia Na 129.   Renal follow up this am noted; agree with at least temporarily holding diuretics and gentle hydration in order to hopefully improve renal function and avoid HD.

## 2025-04-14 NOTE — CHART NOTE - NSCHARTNOTEFT_GEN_A_CORE
KRISTY COYLE      4/14:	RRT called and cancelled.  Pt complaining of Chest pain. After receiving   	Nitro 10 minutes later pt stop talking while body stiffened. Pt returned back to                 baseline in less than 60 seconds.                  VSS: 109/61  HR: 75  Blood sugar: 112  SPO2: 96%                EKG done.   	ST depression in lead V3-V4.  Spoke with Cardiology.  Pt is maxed  on   	antianginals. May need to consult [facility],, Dr. Dominguez,   	Seen by HF team. Bumex dc.  Pending repeat Echo.  Will continue                 telemetry monitoring.         Celestina POLLARD-BC  Department of Medicine.

## 2025-04-14 NOTE — PROGRESS NOTE ADULT - ASSESSMENT
70 M,      h/o   HFrEF,,  CAD (hx of multiple stents, occluded 100% dLAD  not amenable to PCI     Afib  xarelto,    DM, CKD4, HTN, known PAD s/p recent LLE with PT and peroneal stent placement (1/23),    c/o  sob/ c.c    L foot wound.     RHC and cardiomems on 4/4 ,  elevated filling pressures and low output for which he was put on a bumex gtt and his hydralazine dose was increased     LHC 2/4/25: occluded dLAD, D1 100% stenosis    echo, 1/20/25: LVIDd 3.7cm, LVEF 40-45%, RV nml size, borderline reduced RV function, TAPSE 1.1 cm, mildly dilated LA, dilated RA, mild/mod MR, mild TR, PASP 49, IVC nml      rx plan per card/  heart   failure team    DM   HTN  CKD/  c/c  anmeia    c/c  Afib/ xarelto    on    xarelto, ranexa. toprol, lantus , hydralazine ,asa .  al;dactone    less  sob,    was  on iv bumex  infusion   crt  4,.4.   f/p  by  renal  and  chf  team . now  on iv fluids, and  bumex  stopped  y renal/  card  -

## 2025-04-14 NOTE — PROGRESS NOTE ADULT - ATTENDING COMMENTS
RAFA Calle MD have participated in the daily care of this patient  and have seen and examined the patient today and agree with  the  evaluation, assessment and plan of the surgical house officer  RAFA Calle MD have personally seen and examined the patient at bedside today at 8 am
RAFA Calle MD have participated in the daily care of this patient  and have seen and examined the patient today and agree with  the  evaluation, assessment and plan of the surgical house officer  RAFA Calle MD have personally seen and examined the patient at bedside today at  5 pm
Mr Velázquez is a 68 yo male with hx of HFmrEF, and PMH of CAD (hx of multiple stents, occluded 100% dLAD not amenable to PCI), Afib (on xarelto), T2DM, CKD4, HTN, known PAD s/p recent LLE with PT and peroneal stent placement (1/23), is here today for SOB and BLE for ADHF and also for L foot wound and HF consulted for further management. Patient has recent admission 1/31 to 2/7 for ADHF.     Cardiac Studies:  [facility] 2/4/25: occluded dLAD, D1 100% stenosis  -1/29/24: patient prior stents in the LM, LAD and Cx, severe disease dLAD in a smaller caliber sized vessel unchanged from prior [facility] that is not amenable to PCI   TTE 1/20/25: LVIDd 3.7cm, LVEF 40-45%, RV nml size, borderline reduced RV function, TAPSE 1.1 cm, mildly dilated LA, dilated RA, mild/mod MR, mild TR, PASP 49, IVC nml  -TTE 6/24/24: LVIDd 3.8cm, LVEF 45-50%, nml RV size/function, nml atria, mild MR, mild TR.      Assessment:   Acute on chronic systolic HF exacerbation  Heart failure with mid range EF  Ischemic cardiomyopathy  CAD s/p multiple stents  Diabetes mellitus type 2  Chronic kidney disease (creatinine 1.3 in Jan 2025 --> 2 at present)  Permanent atrial fibrillation/flutter  Mild to moderate mitral regurgitation  Peripheral arterial disease      Plan:   Stop bumex gtt. We will dose diuretics based on RHC tomorrow.  GDMT: Increase Toprol to 100 mg daily. Continue aldactone 25 daily.   Other GDMT limited by renal dysfunction. Will continue to reassess.  Continue isordil 30 mg TID. Start hydralazine 10 mg TID.   Consider adding Ranexa 500 mg BID for anginal therapy.   NPO after midnight for RHC and cardiomems tomorrow.   We will reassess LV function once more euvolemic. Last echo noted from January 2025.
As per [facility] staff, pt is not his usual. He reports worsening heraing loss which difficults communication.  He's alert, arousable and is able to answer questions.  He appears hypervolemic with +b/l Le edema and +JVD ~8-9cm.   BP is stable.   His labs demonstrated rising WBC, worsening renal function, , K 3.4 and creatinine 4.88.  His PAD is slightly better but remains elevated.   Bumex gtt d/c'ed as per Nephrology. Will need to restart in the upcoming 1-2 days.   Pls obtain lactate and CBC at next blood draw. DDX include low output state vs infection vs uremia.   Repeat TTE to assess LVOT VTI/Cardiac output.   Consider head CT for AMS.   Multiple comorbidities, frailty and malnutrition are barriers for consideration of advanced therapies.

## 2025-04-14 NOTE — PROGRESS NOTE ADULT - PROBLEM SELECTOR PROBLEM 3
Acute decompensated heart failure
Afib
CAD (coronary artery disease)
Acute decompensated heart failure
CAD (coronary artery disease)
Acute decompensated heart failure
CAD (coronary artery disease)
Acute decompensated heart failure
Afib
Acute decompensated heart failure
CAD (coronary artery disease)
Acute decompensated heart failure
CAD (coronary artery disease)
Acute decompensated heart failure
CAD (coronary artery disease)

## 2025-04-14 NOTE — PROGRESS NOTE ADULT - PROBLEM SELECTOR PROBLEM 4
Stage 4 chronic kidney disease

## 2025-04-14 NOTE — PROVIDER CONTACT NOTE (CRITICAL VALUE NOTIFICATION) - ASSESSMENT
pt is AO4, however today has c/o feeling groggy, feeling confused, and having difficulty hearing. pt  is weak upon assessment and having some alteredness from baseline. pt with c/o CP around 1230pm, however has since resolved, without any complaints currently. VSS. on RA. bumex gtt discontinued.

## 2025-04-14 NOTE — PROGRESS NOTE ADULT - SUBJECTIVE AND OBJECTIVE BOX
Patient seen and examined at bedside.    Overnight Events:   FERNIE   This AM reported feeling groggy, urinary incontinence, and hearing disturbances.   Bumex gtt discontinued.     REVIEW OF SYSTEMS:  as above.        Current Meds:  acetaminophen     Tablet .. 650 milliGRAM(s) Oral every 6 hours PRN  aluminum hydroxide/magnesium hydroxide/simethicone Suspension 30 milliLiter(s) Oral every 4 hours PRN  AQUAPHOR (petrolatum Ointment) 1 Application(s) Topical two times a day  aspirin  chewable 81 milliGRAM(s) Oral daily  bisacodyl 5 milliGRAM(s) Oral every 12 hours PRN  dextrose 5%. 1000 milliLiter(s) IV Continuous <Continuous>  dextrose 5%. 1000 milliLiter(s) IV Continuous <Continuous>  dextrose 50% Injectable 25 Gram(s) IV Push once  dextrose 50% Injectable 12.5 Gram(s) IV Push once  dextrose 50% Injectable 25 Gram(s) IV Push once  dextrose Oral Gel 15 Gram(s) Oral once PRN  glucagon  Injectable 1 milliGRAM(s) IntraMuscular once  hydrALAZINE 100 milliGRAM(s) Oral three times a day  insulin glargine Injectable (LANTUS) 9 Unit(s) SubCutaneous at bedtime  insulin lispro (ADMELOG) corrective regimen sliding scale   SubCutaneous three times a day before meals  insulin lispro (ADMELOG) corrective regimen sliding scale   SubCutaneous at bedtime  insulin lispro Injectable (ADMELOG) 6 Unit(s) SubCutaneous three times a day before meals  isosorbide   dinitrate Tablet (ISORDIL) 40 milliGRAM(s) Oral three times a day  melatonin 3 milliGRAM(s) Oral at bedtime PRN  metoprolol succinate  milliGRAM(s) Oral daily  Nephro-daniel 1 Tablet(s) Oral daily  ondansetron Injectable 4 milliGRAM(s) IV Push every 8 hours PRN  ranolazine 1000 milliGRAM(s) Oral two times a day  rivaroxaban 15 milliGRAM(s) Oral with dinner  senna 2 Tablet(s) Oral at bedtime  sodium chloride 0.9% with potassium chloride 20 mEq/L 1000 milliLiter(s) IV Continuous <Continuous>      Vitals:  T(F): 98 (04-14), Max: 98 (04-14)  HR: 76 (04-14) (76 - 96)  BP: 116/67 (04-14) (114/67 - 125/82)  RR: 18 (04-14)  SpO2: 98% (04-14)  I&O's Summary    13 Apr 2025 07:01  -  14 Apr 2025 07:00  --------------------------------------------------------  IN: 840 mL / OUT: 2750 mL / NET: -1910 mL        Physical Exam:  Appearance: unkempt, mild distress  Cardiovascular: RRR, S1, S2, no murmurs, rubs, or gallops; no edema  Respiratory: Clear to auscultation bilaterally  Gastrointestinal: soft, non-tender, non-distended with normal bowel sounds  Musculoskeletal: BLE edema    Neurologic: Non-focal  Lymphatic: No lymphadenopathy  Skin: No rashes, ecchymoses, or cyanosis                          11.6   13.43 )-----------( 346      ( 13 Apr 2025 06:25 )             34.3     04-14    129[L]  |  81[L]  |  121[H]  ----------------------------<  142[H]  3.2[L]   |  23  |  4.40[H]    Ca    9.5      14 Apr 2025 06:39  Phos  5.8     04-13  Mg     2.6     04-13

## 2025-04-14 NOTE — RAPID RESPONSE TEAM SUMMARY - NSADDTLFINDINGSRRT_GEN_ALL_CORE
RRT called for AMS. Upon arrival, vitals: T 99.1, /86, HR 88, RR 18, O2 sat 100%. Pt AO1 and lethargic, intermittently waking up but will fall asleep mid conversation. Not following commands. Pt with also intermittent twitches/intermittent episodes of jerks. Labs drawn and CTH NC ordered/obtained during rapid.

## 2025-04-14 NOTE — CHART NOTE - NSCHARTNOTEFT_GEN_A_CORE
Wound Care Team Note:    Request for wound care consult for foot/toe wound received and referred to podiatry. Will defer to podiatry for management. Please contact Podiatry for questions/concerns. Will not follow.    MARY JeronimoC, CWOCN via TEAMS  .

## 2025-04-14 NOTE — PROGRESS NOTE ADULT - SUBJECTIVE AND OBJECTIVE BOX
Overnight events noted      VITAL:  T(C): , Max: 36.5 (04-14-25 @ 04:00)  T(F): , Max: 97.7 (04-14-25 @ 04:00)  HR: 96 (04-14-25 @ 04:00)  BP: 125/82 (04-14-25 @ 04:00)  RR: 18 (04-14-25 @ 04:00)  SpO2: 94% (04-14-25 @ 04:00)  Urine output 2750cc/24h      PHYSICAL EXAM:  Constitutional: NAD, Alert  HEENT: NCAT, MMM  Neck: Supple, No JVD  Respiratory: CTA-b/l  Cardiovascular: RRR s1s2, no m/r/g  Gastrointestinal: BS+, soft, NT/ND  Extremities: 1+ b/l LE edema  Neurological: no focal deficits; strength grossly intact  Back: no CVAT b/l  Skin: No rashes, no nevi    LABS:                        11.6   13.43 )-----------( 346      ( 13 Apr 2025 06:25 )             34.3     Na(129)/K(3.2)/Cl(81)/HCO3(23)/BUN(121)/Cr(4.40)Glu(142)/Ca(9.5)/Mg(--)/PO4(--)    04-14 @ 06:39  Na(129)/K(4.1)/Cl(86)/HCO3(22)/BUN(101)/Cr(4.02)Glu(189)/Ca(9.8)/Mg(2.6)/PO4(5.8)    04-13 @ 06:25  Na(134)/K(4.6)/Cl(91)/HCO3(23)/BUN(100)/Cr(3.57)Glu(239)/Ca(9.9)/Mg(--)/PO4(--)    04-12 @ 06:55  Na(132)/K(3.9)/Cl(89)/HCO3(22)/BUN(94)/Cr(3.21)Glu(155)/Ca(10.3)/Mg(--)/PO4(--)    04-11 @ 17:52        IMPRESSION: 70M w/ HTN, DM2, CAD, HFrEF, and CKD4, 3/26/25 p/w ADHF    (1)CKD - 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     (2)BARRY - prerenal/cardiorenal in association with diuresis. Whereas I appreciate that MEMS readings have demonstrated that he is still overloaded, he reported last week that his dyspnea was much improved...and at that point his creatinine was in the high 2s. I would argue that, provided we are able to render him symptom free, and if he is not at significant risk for uremic complications/other imminent medical complications, then we should not subject him to chronic dialysis regardless of MEMS readings. Therefore, at this point I would opt to stop diuretics and provide volume back, in effort to reverse the BARRY.    (3)Hyponatremia - due to low effective arterial blood volume    (4)CV - acute on chronic HFrEF - much improved relative to admission, with Bumex gtt, Metolazone, and Spironolactone.       RECOMMEND:  (1)D/C Bumex gtt, Metolazone, and Spironolactone  (2)Add NS+20meq/L KCL, 100cc/h x 2L  (3)Dose new meds for GFR<15 (present dosing is acceptable  (4)Would forgo dialysis here  (5)BMP+Mg at least daily while admitted          Markell Walton MD  [facility]  Office/on call physician: [phone]  Cell (7a-7p): [phone]       Problem: Dysphagia (Adult)  Goal: *Acute Goals and Plan of Care (Insert Text)  Recommendations:  Diet: mech-soft, chopped/honey-thick liquids (cup sips)  Meds: one at a time in pudding  Aspiration Precautions  Oral Care TID  Other: MBS completed with aspiration on thin and nectar    Goals: Patient will:  1. Tolerate PO trials with 0 s/s overt distress in 4/5 trials  2. Utilize compensatory swallow strategies/maneuvers (decrease bite/sip, size/rate, alt. liq/sol) with min cues in 4/5 trials  3. Perform oral-motor/laryngeal exercises to increase oropharyngeal swallow function with min cues  4. Complete an objective swallow study (i.e., MBSS) to assess swallow integrity, r/o aspiration, and determine of safest LRD, min A - goal met 3/21/17     Outcome: Progressing Towards Goal  SPEECH LANGUAGE PATHOLOGY DYSPHAGIA TREATMENT     Patient: Emanuel Carrasco (89 y.o. female)  Date: 3/28/2017  Diagnosis: Severe sepsis with septic shock (CODE) (HCC)  UTI (urinary tract infection)  Severe sepsis with septic shock (CODE) (HCC)  Severe thrombocytopenia (HCC)  mucous plugs  mucous plugs Severe sepsis with septic shock (CODE) (HCC)  Procedure(s) (LRB):  BRONCHOSCOPY (N/A) 12 Days Post-Op  Precautions: aspiration Contact      ASSESSMENT:  Pt seen for laryngeal/pharyngeal strengthening exercises this pm. Pt A&O x2. Dwayne Lee Utilized thermal carbonated thin liquid to target pharyngeal swallow timeliness; via tsp presented for use with effortful swallow trials. Pt with no overt s/sx aspiration across trials. Strong laryngeal elevation to palpation on 50% of trials. Patty method incoorporated for increased strength; 100% participation. SLP educated pt on strengthening strategies, MBS results, restricted diet vs. comfort diet (as pt requesting thin liquids outside of tx session); verbalized comprehension. Recommend mech-soft solid/honey thick liquid diet. ST to continue to follow.   Progression toward goals:  [ ]         Improving appropriately and progressing toward goals  [X]         Improving slowly and progressing toward goals  [ ]         Not making progress toward goals and plan of care will be adjusted       PLAN:  Recommendations and Planned Interventions:  mech-soft solid/honey thick liquid diet  Patient continues to benefit from skilled intervention to address the above impairments. Continue treatment per established plan of care. Discharge Recommendations:  Home Health       SUBJECTIVE:   Patient stated Can I keep that? .      OBJECTIVE:   Cognitive and Communication Status:  Neurologic State: Alert  Orientation Level: Oriented to person, Oriented to place, Oriented to situation, Disoriented to time  Cognition: Follows commands, Appropriate for age attention/concentration  Perception: Verbal  Perseveration: No perseveration noted  Safety/Judgement: Decreased awareness of environment, Lack of insight into deficits  Dysphagia Treatment:  Oral Assessment:  Oral Assessment  Labial: Decreased rate, Decreased seal  Dentition: Intact  Oral Hygiene: good  Lingual: No impairment  Velum: No impairment  Mandible: No impairment  P.O. Trials:              Patient Position: HOB 45              Vocal quality prior to P.O.: Low volume              Consistency Presented:  Thin liquid              How Presented: SLP-fed/presented, Spoon              How Much:  (x10)              Bolus Acceptance: No impairment              Bolus Formation/Control: Impaired              Type of Impairment: Delayed, Mastication, Lip closure              Propulsion: Delayed (# of seconds)              Oral Residue: None              Initiation of Swallow: Delayed (# of seconds)              Laryngeal Elevation: Decreased              Aspiration Signs/Symptoms: None              Pharyngeal Phase Characteristics: No impairment, issues, or problems               Effective Modifications: Small sips and bites, Spoon              Cues for Modifications: Minimal Oral Phase Severity: Mild              Pharyngeal Phase Severity : Moderate                            Exercises:  Laryngeal Exercises:                    Effortful Swallow: Yes  Sets : 2  Reps : 10                                Patty: Yes  Sets : 1  Reps : 10                          PAIN:  Start of Tx: 0  End of Tx:  0     After treatment:   [ ]              Patient left in no apparent distress sitting up in chair  [X]              Patient left in no apparent distress in bed  [X]              Call bell left within reach  [ ]              Nursing notified  [ ]              Family present  [ ]              Caregiver present  [ ]              Bed alarm activated         COMMUNICATION/EDUCATION:   [X]        Aspiration precautions; swallow safety; compensatory techniques  [ ]        Patient unable to participate in education; education ongoing with staff  [ ]         Posted safety precautions in patient's room.   [ ]         Oral-motor/laryngeal strengthening exercises        Chandan Decker   SLP Intern     Time Calculation: 17 mins Overnight events noted      VITAL:  T(C): , Max: 36.5 (04-14-25 @ 04:00)  T(F): , Max: 97.7 (04-14-25 @ 04:00)  HR: 96 (04-14-25 @ 04:00)  BP: 125/82 (04-14-25 @ 04:00)  RR: 18 (04-14-25 @ 04:00)  SpO2: 94% (04-14-25 @ 04:00)  Urine output 2750cc/24h      PHYSICAL EXAM:  Constitutional: NAD, Alert  HEENT: NCAT, MMM  Neck: Supple, No JVD  Respiratory: CTA-b/l  Cardiovascular: RRR s1s2, no m/r/g  Gastrointestinal: BS+, soft, NT/ND  Extremities: 1+ b/l LE edema  Neurological: no focal deficits; strength grossly intact  Back: no CVAT b/l  Skin: No rashes, no nevi    LABS:                        11.6   13.43 )-----------( 346      ( 13 Apr 2025 06:25 )             34.3     Na(129)/K(3.2)/Cl(81)/HCO3(23)/BUN(121)/Cr(4.40)Glu(142)/Ca(9.5)/Mg(--)/PO4(--)    04-14 @ 06:39  Na(129)/K(4.1)/Cl(86)/HCO3(22)/BUN(101)/Cr(4.02)Glu(189)/Ca(9.8)/Mg(2.6)/PO4(5.8)    04-13 @ 06:25  Na(134)/K(4.6)/Cl(91)/HCO3(23)/BUN(100)/Cr(3.57)Glu(239)/Ca(9.9)/Mg(--)/PO4(--)    04-12 @ 06:55  Na(132)/K(3.9)/Cl(89)/HCO3(22)/BUN(94)/Cr(3.21)Glu(155)/Ca(10.3)/Mg(--)/PO4(--)    04-11 @ 17:52        IMPRESSION: 70M w/ HTN, DM2, CAD, HFrEF, and CKD4, 3/26/25 p/w ADHF    (1)CKD - 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     (2)BARRY - prerenal/cardiorenal in association with diuresis. Whereas I appreciate that MEMS readings have demonstrated that he is still overloaded, he reported last week that his dyspnea was much improved...and at that point his creatinine was in the high 2s. I would argue that, provided we are able to render him symptom free, and if he is not at significant risk for uremic complications/other imminent medical complications, then we should not subject him to chronic dialysis regardless of MEMS readings. Therefore, at this point I would opt to stop diuretics and provide volume back, in effort to reverse the BARRY.    (3)Hyponatremia - due to low effective arterial blood volume    (4)CV - acute on chronic HFrEF - much improved relative to admission, with Bumex gtt, Metolazone, and Spironolactone.       RECOMMEND:  (1)D/C Bumex gtt, Metolazone, and Spironolactone  (2)Add NS+20meq/L KCL, 100cc/h x 2L  (3)Dose new meds for GFR<15 (present dosing is acceptable  (4)Would forgo dialysis here  (5)BMP+Mg at least daily while admitted    D/w'd Medicine ACP Celestina Baumann; communicated recs to covering IM attending Dr. Lili Ridley  Called HF service at [phone], relayed input by phone to Cardiology fellow Dr. Cirilo Alvarado.   Called wife Elizabeth at [phone] and discussed with her the options at present of (a)dialysis and (b)medical management/no dialysis. Answered all questions to her satisfaction. She agrees that dialysis would only be a last resort; we should try to avoid it if possible.      Mrakell Walton MD  [facility]  Office/on call physician: [phone]  Cell (7a-7p): [phone]       "I'm speaking in tongues" (keeps repeating)  Denies pain, nausea, vomiting, or shortness of breath  Admits to fatigue      VITAL:  T(C): , Max: 36.5 (04-14-25 @ 04:00)  T(F): , Max: 97.7 (04-14-25 @ 04:00)  HR: 96 (04-14-25 @ 04:00)  BP: 125/82 (04-14-25 @ 04:00)  RR: 18 (04-14-25 @ 04:00)  SpO2: 94% (04-14-25 @ 04:00)  Urine output 2750cc/24h      PHYSICAL EXAM:  Constitutional: lethargic/confused-seemingly obtunded  HEENT: NCAT, DMM  Neck: Supple, No JVD  Respiratory: CTA-b/l  Cardiovascular: RRR s1s2, no m/r/g  Gastrointestinal: BS+, soft, NT/ND  Extremities: no edema b/l  Back: no CVAT b/l  Skin: No rashes, no nevi    LABS:                        11.6   13.43 )-----------( 346      ( 13 Apr 2025 06:25 )             34.3     Na(129)/K(3.2)/Cl(81)/HCO3(23)/BUN(121)/Cr(4.40)Glu(142)/Ca(9.5)/Mg(--)/PO4(--)    04-14 @ 06:39  Na(129)/K(4.1)/Cl(86)/HCO3(22)/BUN(101)/Cr(4.02)Glu(189)/Ca(9.8)/Mg(2.6)/PO4(5.8)    04-13 @ 06:25  Na(134)/K(4.6)/Cl(91)/HCO3(23)/BUN(100)/Cr(3.57)Glu(239)/Ca(9.9)/Mg(--)/PO4(--)    04-12 @ 06:55  Na(132)/K(3.9)/Cl(89)/HCO3(22)/BUN(94)/Cr(3.21)Glu(155)/Ca(10.3)/Mg(--)/PO4(--)    04-11 @ 17:52        IMPRESSION: 70M w/ HTN, DM2, CAD, HFrEF, and CKD4, 3/26/25 p/w ADHF    (1)CKD - 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     (2)BARRY - prerenal/cardiorenal in association with diuresis. Whereas I appreciate that MEMS readings have demonstrated that he is still overloaded, he reported last week that his dyspnea was much improved...and at that point his creatinine was in the high 2s. I would argue that, provided we are able to render him symptom free, and if he is not at significant risk for uremic complications/other imminent medical complications, then we should not subject him to chronic dialysis regardless of MEMS readings. Therefore, at this point I would opt to stop diuretics and provide volume back, in effort to reverse the BARRY.    (3)Hyponatremia - due to low effective arterial blood volume    (4)CV - acute on chronic HFrEF - much improved relative to admission, with Bumex gtt, Metolazone, and Spironolactone - now appears intravascularly depleted      RECOMMEND:  (1)D/C Bumex gtt, Metolazone, and Spironolactone  (2)Add NS+20meq/L KCL, 100cc/h x 2L  (3)Dose new meds for GFR<15 (present dosing is acceptable  (4)Would forgo dialysis here  (5)BMP+Mg at least daily while admitted    D/w'd Medicine ACP Celestina Baumann; communicated recs to covering IM attending Dr. Lili Ridley  D/w'd outpt Cardiology attending Dr. Alfred Moon by phone   Called HF service at [phone], relayed input by phone to Cardiology fellow Dr. Cirilo Alvarado.   Called wife Elizabeth at [phone] and discussed with her the options at present of (a)dialysis and (b)medical management/no dialysis. Answered all questions to her satisfaction. She agrees that dialysis would only be a last resort; we should try to avoid it if possible.      Markell Walton MD  [facility]  Office/on call physician: [phone]  Cell (7a-7p): [phone]

## 2025-04-14 NOTE — PROGRESS NOTE ADULT - PROBLEM SELECTOR PROBLEM 1
Type 2 diabetes mellitus
Acute decompensated heart failure
Type 2 diabetes mellitus
Acute decompensated heart failure
Type 2 diabetes mellitus
Acute decompensated heart failure
Arterial insufficiency with ischemic ulcer
Acute decompensated heart failure
Acute decompensated heart failure
Type 2 diabetes mellitus
Type 2 diabetes mellitus
Acute decompensated heart failure
Type 2 diabetes mellitus
Acute decompensated heart failure
Acute decompensated heart failure
Type 2 diabetes mellitus
Type 2 diabetes mellitus
Acute decompensated heart failure
Type 2 diabetes mellitus
Acute decompensated heart failure
Acute decompensated heart failure
Arterial insufficiency with ischemic ulcer

## 2025-04-14 NOTE — CHART NOTE - NSCHARTNOTEFT_GEN_A_CORE
Medicine ACP Event Note  Date of Service: 25 @ 21:28    Subjective:  RRT called for AMS. Patient seen and examined at bedside w/ RRT team present. Patient's baseline is A&O x4 per RN and wife at bedside. Now patient more lethargic A&O 0-1, unable to answer questions fully. See RRT note for full details.         Objective Findings:  T(C): 36.8 (25 @ 19:17), Max: 36.8 (25 @ 19:17)  HR: 52 (25 @ 19:17) (52 - 96)  BP: 113/55 (25 @ 19:17) (95/65 - 125/82)  RR: 18 (25 @ 19:17) (18 - 18)  SpO2: 99% (25 @ 19:17) (94% - 99%)  Wt(kg): --  Daily     Daily Weight in k.1 (2025 04:00)      Telemetry:     Laboratory Data:                        11.0   30.64 )-----------( 334      ( 2025 20:19 )             31.8     -14    130[L]  |  85[L]  |  135[H]  ----------------------------<  90  4.2   |  20[L]  |  4.73[H]    Ca    9.5      2025 20:19  Phos  9.2     -14  Mg     2.8     -    TPro  7.1  /  Alb  3.9  /  TBili  1.6[H]  /  DBili  x   /  AST  30  /  ALT  16  /  AlkPhos  114  -14    PT/INR - ( 2025 20:19 )   PT: 67.9 sec;   INR: 6.06 ratio         PTT - ( 2025 20:19 )  PTT:52.8 sec          Inpatient Medications:  MEDICATIONS  (STANDING):  AQUAPHOR (petrolatum Ointment) 1 Application(s) Topical two times a day  aspirin  chewable 81 milliGRAM(s) Oral daily  dextrose 5%. 1000 milliLiter(s) (100 mL/Hr) IV Continuous <Continuous>  dextrose 5%. 1000 milliLiter(s) (50 mL/Hr) IV Continuous <Continuous>  dextrose 50% Injectable 25 Gram(s) IV Push once  dextrose 50% Injectable 12.5 Gram(s) IV Push once  dextrose 50% Injectable 25 Gram(s) IV Push once  glucagon  Injectable 1 milliGRAM(s) IntraMuscular once  hydrALAZINE 100 milliGRAM(s) Oral three times a day  insulin glargine Injectable (LANTUS) 9 Unit(s) SubCutaneous at bedtime  insulin lispro (ADMELOG) corrective regimen sliding scale   SubCutaneous three times a day before meals  insulin lispro (ADMELOG) corrective regimen sliding scale   SubCutaneous at bedtime  insulin lispro Injectable (ADMELOG) 6 Unit(s) SubCutaneous three times a day before meals  isosorbide   dinitrate Tablet (ISORDIL) 40 milliGRAM(s) Oral three times a day  metoprolol succinate  milliGRAM(s) Oral daily  Nephro-daniel 1 Tablet(s) Oral daily  ranolazine 1000 milliGRAM(s) Oral two times a day  rivaroxaban 15 milliGRAM(s) Oral with dinner  senna 2 Tablet(s) Oral at bedtime  sodium chloride 0.9% with potassium chloride 20 mEq/L 1000 milliLiter(s) (100 mL/Hr) IV Continuous <Continuous>      Assessment/Plan of Care:  Mr Velázquez is a 70 M, with hx of HFmrEF, and PMH of CAD (hx of multiple stents, occluded 100% dLAD not amenable to PCI), Afib (on xarelto), T2DM, CKD4, HTN, known PAD s/p recent LLE with PT and peroneal stent placement (), is here for SOB and BLE for ADHF and also for L foot wound. Patient has recent admission  to  for ADHF.     He underwent RHC and cardiomems on  which showed significantly elevated filling pressures and low output for which he was put on a bumex gtt and his hydralazine dose was increased for further afterload reduction.     He remains volume overloaded on exam and with PAD reading 36 today which is unchanged from yesterday 4/10 and will continue to further diuresis him and also has mildly elevated BP and will increase his dosing of vasodilators. Will also trend his renal function as it is slightly elevated. Episodes of stable angina has now improved with escalated dose of ranexa.     Now RRT called for Change in mental status    #s/p RRT for AMS  - See RRT note for full details  - STAT CT head ordered   - STAT labs  - Neuro checks Q4  - MRI head pending   - Neurology consulted overnight for further recs   - Discussed case w/ covering Internist Dr. Ridley  - Discussed plan with patient's wife at bedside  - Will monitor patient closely overnight  - Will endorse to day team in AM           Ross Ariza PA-C  Medicine ACP

## 2025-04-14 NOTE — PROGRESS NOTE ADULT - PROBLEM SELECTOR PLAN 1
-Etiology: likely ischemic    -GDMT: Continue hydralazine to 100mg TID, hold for SBP < 90    -Continue ISDN 40mg TID and Toprol XL 100mg QD  -Eventual ARB/ARNi/MRA if renal function permits   -Will hold off on SGLT2-i for now given PAD with ulcers and fluctuating creatinine     -Diuretics: Holding bumex gtt now, s/p metolazone this AM   Remains volume up on exam. With worsening renal function and drop in Cl. Can consider 3% hypertonic saline   -please repeat BMP, MAg, Phos   -Will check cardiomems readings intermittently. Goal to be established once optimized.  -Strict I/Os and daily weights  -Keep K>4 and Mg>2  -PT and OOB as tolerated -Etiology: likely ischemic    -GDMT: Continue hydralazine to 100mg TID, hold for SBP < 90    -Continue ISDN 40mg TID and Toprol XL 100mg QD  -Eventual ARB/ARNi/MRA if renal function permits   -Will hold off on SGLT2-i for now given PAD with ulcers and fluctuating creatinine     -Diuretics: Holding bumex gtt now, s/p metolazone this AM. Plan to restart in 1-2 days.   PLease add lactate to next set of labs. ?Need for inotropes.  Repeat TTE  Remains volume up on exam. With worsening renal function and drop in Cl. Can consider 3% hypertonic saline   -please repeat BMP, MAg, Phos   -Will check cardiomems readings intermittently. Goal to be established once optimized.  -Strict I/Os and daily weights  -Keep K>4 and Mg>2  -PT and OOB as tolerated

## 2025-04-14 NOTE — PROGRESS NOTE ADULT - ASSESSMENT
Assessment  DMT2: 70y Male with DM T2 with hyperglycemia admitted with SOB,  patient was on oral hypoglycemic agents at home, now on  insulin coverage, blood sugars are fluctuating due to inconsistent food intake.  Patient is high risk with high level decision making due to uncontrolled diabetes, has increased risk for complications.   CAD: on medications, monitored, no acute events.  HTN: On antihypertensive medications, monitored, stable.          Discussed plan and management with Dr Flo Jamil NP - TEAMS  Myrna Rossi MD  Cell: [phone]  Office: [phone]      Assessment  DMT2: 70y Male with DM T2 with hyperglycemia admitted with SOB,  patient was on oral hypoglycemic agents at home, now on  insulin coverage, blood sugars are fluctuating due to inconsistent food intake.  Patient is high risk with high level decision making due to uncontrolled diabetes, has increased risk for complications.   CAD: on medications, monitored, no acute events.  HTN: On antihypertensive medications, monitored, stable.          Discussed plan and management with Dr Flo Jamil NP - TEAMS  Myrna Rossi MD  Cell: [phone]  Office: [phone]

## 2025-04-14 NOTE — PROGRESS NOTE ADULT - PROBLEM SELECTOR PROBLEM 2
Afib
Stage 2 chronic kidney disease
Afib
Stage 2 chronic kidney disease
Afib
CAD (coronary artery disease)
Afib
CAD (coronary artery disease)

## 2025-04-14 NOTE — PROGRESS NOTE ADULT - PROBLEM SELECTOR PLAN 4
Cr currently plateaued 2.6-2.7  Continue to monitor with aggressive diuresis  Nephrology following
Cr currently relatively stable  Continue to monitor with aggressive diuresis  Nephrology following
Cr currently plateaued 2.6-2.7  Continue to monitor with aggressive diuresis  Nephrology following
-Cr is today 3.17 (from 2.88, 2.8)  -Continue to monitor with aggressive diuresis  -Nephrology following
Creatinine 2.25 today from 2.0 yesterday  Hold diuretics for today  will adjust diuretics after RHC tomorrow
Cr currently relatively stable  Continue to monitor with aggressive diuresis  Nephrology following
Continuing to uptrend despite diuresis.   -Nephrology following
Cr currently plateaued 2.6-2.7  Continue to monitor with aggressive diuresis  Nephrology following

## 2025-04-14 NOTE — PROGRESS NOTE ADULT - SUBJECTIVE AND OBJECTIVE BOX
date of service: 04-14-25 @ 11:12  [facility]  REVIEW OF SYSTEMS:  CONSTITUTIONAL: No fever,  no  weight loss  ENT:  No  tinnitus,   no   vertigo  NECK: No pain or stiffness  RESPIRATORY: No cough, wheezing, chills or hemoptysis;    No Shortness of Breath  CARDIOVASCULAR: No chest pain, palpitations, dizziness  GASTROINTESTINAL: No abdominal or epigastric pain. No nausea, vomiting, or hematemesis; No diarrhea  No melena or hematochezia.  GENITOURINARY: No dysuria, frequency, hematuria, or incontinence  NEUROLOGICAL: No headaches  SKIN: No itching,  no   rash  LYMPH Nodes: No enlarged glands  ENDOCRINE: No heat or cold intolerance  MUSCULOSKELETAL: No joint pain or swelling  PSYCHIATRIC: No depression, anxiety  HEME/LYMPH: No easy bruising, or bleeding gums  ALLERGY AND IMMUNOLOGIC: No hives or eczema	    MEDICATIONS  (STANDING):  AQUAPHOR (petrolatum Ointment) 1 Application(s) Topical two times a day  aspirin  chewable 81 milliGRAM(s) Oral daily  dextrose 5%. 1000 milliLiter(s) (100 mL/Hr) IV Continuous <Continuous>  dextrose 5%. 1000 milliLiter(s) (50 mL/Hr) IV Continuous <Continuous>  dextrose 50% Injectable 25 Gram(s) IV Push once  dextrose 50% Injectable 12.5 Gram(s) IV Push once  dextrose 50% Injectable 25 Gram(s) IV Push once  glucagon  Injectable 1 milliGRAM(s) IntraMuscular once  hydrALAZINE 100 milliGRAM(s) Oral three times a day  insulin glargine Injectable (LANTUS) 9 Unit(s) SubCutaneous at bedtime  insulin lispro (ADMELOG) corrective regimen sliding scale   SubCutaneous three times a day before meals  insulin lispro (ADMELOG) corrective regimen sliding scale   SubCutaneous at bedtime  insulin lispro Injectable (ADMELOG) 6 Unit(s) SubCutaneous three times a day before meals  isosorbide   dinitrate Tablet (ISORDIL) 40 milliGRAM(s) Oral three times a day  metoprolol succinate  milliGRAM(s) Oral daily  Nephro-daniel 1 Tablet(s) Oral daily  ranolazine 1000 milliGRAM(s) Oral two times a day  rivaroxaban 15 milliGRAM(s) Oral with dinner  senna 2 Tablet(s) Oral at bedtime  sodium chloride 0.9% with potassium chloride 20 mEq/L 1000 milliLiter(s) (100 mL/Hr) IV Continuous <Continuous>    MEDICATIONS  (PRN):  acetaminophen     Tablet .. 650 milliGRAM(s) Oral every 6 hours PRN Temp greater or equal to 38C (100.4F), Mild Pain (1 - 3)  aluminum hydroxide/magnesium hydroxide/simethicone Suspension 30 milliLiter(s) Oral every 4 hours PRN Dyspepsia  bisacodyl 5 milliGRAM(s) Oral every 12 hours PRN Constipation  dextrose Oral Gel 15 Gram(s) Oral once PRN Blood Glucose LESS THAN 70 milliGRAM(s)/deciliter  melatonin 3 milliGRAM(s) Oral at bedtime PRN Insomnia  ondansetron Injectable 4 milliGRAM(s) IV Push every 8 hours PRN Nausea and/or Vomiting      Vital Signs Last 24 Hrs  T(C): 36.5 (14 Apr 2025 04:00), Max: 36.5 (14 Apr 2025 04:00)  T(F): 97.7 (14 Apr 2025 04:00), Max: 97.7 (14 Apr 2025 04:00)  HR: 96 (14 Apr 2025 04:00) (71 - 96)  BP: 125/82 (14 Apr 2025 04:00) (114/67 - 154/76)  BP(mean): --  RR: 18 (14 Apr 2025 04:00) (18 - 18)  SpO2: 94% (14 Apr 2025 04:00) (94% - 97%)    Parameters below as of 14 Apr 2025 04:00  Patient On (Oxygen Delivery Method): room air      CAPILLARY BLOOD GLUCOSE      POCT Blood Glucose.: 152 mg/dL (14 Apr 2025 09:11)  POCT Blood Glucose.: 194 mg/dL (13 Apr 2025 21:33)  POCT Blood Glucose.: 125 mg/dL (13 Apr 2025 17:11)  POCT Blood Glucose.: 110 mg/dL (13 Apr 2025 12:31)    I&O's Summary    13 Apr 2025 07:01  -  14 Apr 2025 07:00  --------------------------------------------------------  IN: 840 mL / OUT: 2750 mL / NET: -1910 mL          Appearance: Normal	  HEENT:   Normal oral mucosa, PERRL, EOMI	  Lymphatic: No lymphadenopathy  Cardiovascular: Normal S1 S2, No JVD  Respiratory: Lungs clear to auscultation	  Gastrointestinal:  Soft, Non-tender, + BS	  Skin: No rash, No ecchymoses	  Extremities:     LABS:                        11.6   13.43 )-----------( 346      ( 13 Apr 2025 06:25 )             34.3     04-14    129[L]  |  81[L]  |  121[H]  ----------------------------<  142[H]  3.2[L]   |  23  |  4.40[H]    Ca    9.5      14 Apr 2025 06:39  Phos  5.8     04-13  Mg     2.6     04-13            Urinalysis Basic - ( 14 Apr 2025 06:39 )    Color: x / Appearance: x / SG: x / pH: x  Gluc: 142 mg/dL / Ketone: x  / Bili: x / Urobili: x   Blood: x / Protein: x / Nitrite: x   Leuk Esterase: x / RBC: x / WBC x   Sq Epi: x / Non Sq Epi: x / Bacteria: x                      Consultant(s) Notes Reviewed:      Care Discussed with Consultants/Other Providers:

## 2025-04-14 NOTE — PROGRESS NOTE ADULT - ASSESSMENT
Outpatient cardiologist: Dr. Alfred Moon     Mr Velázquez is a 70 M, with hx of HFmrEF, and PMH of CAD (hx of multiple stents, occluded 100% dLAD not amenable to PCI), Afib (on xarelto), T2DM, CKD4, HTN, known PAD s/p recent LLE with PT and peroneal stent placement (1/23), is here for SOB and BLE for ADHF and also for L foot wound. Patient has recent admission 1/31 to 2/7 for ADHF.     He underwent RHC and cardiomems on 4/4 which showed significantly elevated filling pressures and low output for which he was put on a bumex gtt and his hydralazine dose was increased for further afterload reduction.     He remains volume overloaded on exam and with PAD reading 36 today which is unchanged from yesterday 4/10 and will continue to further diuresis him and also has mildly elevated BP and will increase his dosing of vasodilators. Will also trend his renal function as it is slightly elevated. Episodes of stable angina has now improved with escalated dose of ranexa.     Cardiomems (goal to be established once more volume optimized)  4/11 PAD 36  4/10 PAD 36  4/9 PAD 36  4/8 PAD 39    Cardiac Studies:  4/4/25 RHC/cardiomems (formal report pending) RA 28, RV 85/6, PA 78/32 (47), PCWP 30, CO/CI: 2.72/1.67    [facility] 2/4/25: occluded dLAD, D1 100% stenosis  -1/29/24: patient prior stents in the LM, LAD and Cx, severe disease dLAD in a smaller caliber sized vessel unchanged from prior [facility] that is not amenable to PCI   TTE 1/20/25: LVIDd 3.7cm, LVEF 40-45%, RV nml size, borderline reduced RV function, TAPSE 1.1 cm, mildly dilated LA, dilated RA, mild/mod MR, mild TR, PASP 49, IVC nml  -TTE 6/24/24: LVIDd 3.8cm, LVEF 45-50%, nml RV size/function, nml atria, mild MR, mild TR,      Outpatient cardiologist: Dr. Alfred Moon     Mr Velázquez is a 70 M, with hx of HFmrEF, and PMH of CAD (hx of multiple stents, occluded 100% dLAD not amenable to PCI), Afib (on xarelto), T2DM, CKD4, HTN, known PAD s/p recent LLE with PT and peroneal stent placement (1/23), is here for SOB and BLE for ADHF and also for L foot wound. Patient has recent admission 1/31 to 2/7 for ADHF.     He underwent RHC and cardiomems on 4/4 which showed significantly elevated filling pressures and low output for which he was put on a bumex gtt and his hydralazine dose was increased for further afterload reduction.     He remains volume overloaded on exam and with PAD reading 36 today which is unchanged from yesterday 4/10 and will continue to further diuresis him and also has mildly elevated BP and will increase his dosing of vasodilators. Will also trend his renal function as it is slightly elevated. Episodes of stable angina has now improved with escalated dose of ranexa.     Cardiomems (goal to be established once more volume optimized)  4/14 PAD 31  4/11 PAD 36  4/10 PAD 36  4/9 PAD 36  4/8 PAD 39    Cardiac Studies:  4/4/25 RHC/cardiomems (formal report pending) RA 28, RV 85/6, PA 78/32 (47), PCWP 30, CO/CI: 2.72/1.67    [facility] 2/4/25: occluded dLAD, D1 100% stenosis  -1/29/24: patient prior stents in the LM, LAD and Cx, severe disease dLAD in a smaller caliber sized vessel unchanged from prior [facility] that is not amenable to PCI   TTE 1/20/25: LVIDd 3.7cm, LVEF 40-45%, RV nml size, borderline reduced RV function, TAPSE 1.1 cm, mildly dilated LA, dilated RA, mild/mod MR, mild TR, PASP 49, IVC nml  -TTE 6/24/24: LVIDd 3.8cm, LVEF 45-50%, nml RV size/function, nml atria, mild MR, mild TR,

## 2025-04-14 NOTE — PROGRESS NOTE ADULT - SUBJECTIVE AND OBJECTIVE BOX
Chief complaint  Patient is a 70y old  Male who presents with a chief complaint of sob (14 Apr 2025 11:12)         Labs and Fingersticks  CAPILLARY BLOOD GLUCOSE      POCT Blood Glucose.: 87 mg/dL (14 Apr 2025 17:54)  POCT Blood Glucose.: 112 mg/dL (14 Apr 2025 12:40)  POCT Blood Glucose.: 152 mg/dL (14 Apr 2025 09:11)  POCT Blood Glucose.: 194 mg/dL (13 Apr 2025 21:33)      Anion Gap: 28 *H* (04-14 @ 14:15)  Anion Gap: 25 *H* (04-14 @ 06:39)  Anion Gap: 21 *H* (04-13 @ 06:25)      Calcium: 10.0 (04-14 @ 14:15)  Calcium: 9.5 (04-14 @ 06:39)  Calcium: 9.8 (04-13 @ 06:25)          04-14    134[L]  |  83[L]  |  129[H]  ----------------------------<  86  3.4[L]   |  23  |  4.88[H]    Ca    10.0      14 Apr 2025 14:15  Phos  5.8     04-13  Mg     2.8     04-14                          11.6   13.43 )-----------( 346      ( 13 Apr 2025 06:25 )             34.3     Medications  MEDICATIONS  (STANDING):  AQUAPHOR (petrolatum Ointment) 1 Application(s) Topical two times a day  aspirin  chewable 81 milliGRAM(s) Oral daily  dextrose 5%. 1000 milliLiter(s) (100 mL/Hr) IV Continuous <Continuous>  dextrose 5%. 1000 milliLiter(s) (50 mL/Hr) IV Continuous <Continuous>  dextrose 50% Injectable 25 Gram(s) IV Push once  dextrose 50% Injectable 12.5 Gram(s) IV Push once  dextrose 50% Injectable 25 Gram(s) IV Push once  glucagon  Injectable 1 milliGRAM(s) IntraMuscular once  hydrALAZINE 100 milliGRAM(s) Oral three times a day  insulin glargine Injectable (LANTUS) 9 Unit(s) SubCutaneous at bedtime  insulin lispro (ADMELOG) corrective regimen sliding scale   SubCutaneous three times a day before meals  insulin lispro (ADMELOG) corrective regimen sliding scale   SubCutaneous at bedtime  insulin lispro Injectable (ADMELOG) 6 Unit(s) SubCutaneous three times a day before meals  isosorbide   dinitrate Tablet (ISORDIL) 40 milliGRAM(s) Oral three times a day  metoprolol succinate  milliGRAM(s) Oral daily  Nephro-daniel 1 Tablet(s) Oral daily  ranolazine 1000 milliGRAM(s) Oral two times a day  rivaroxaban 15 milliGRAM(s) Oral with dinner  senna 2 Tablet(s) Oral at bedtime  sodium chloride 0.9% with potassium chloride 20 mEq/L 1000 milliLiter(s) (100 mL/Hr) IV Continuous <Continuous>      Physical Exam  General: Patient comfortable in bed   Vital Signs Last 12 Hrs  T(F): 98 (04-14-25 @ 11:06), Max: 98 (04-14-25 @ 11:06)  HR: 76 (04-14-25 @ 11:15) (76 - 94)  BP: 98/67 (04-14-25 @ 17:22) (95/65 - 116/67)  BP(mean): --  RR: 18 (04-14-25 @ 11:06) (18 - 18)  SpO2: 98% (04-14-25 @ 11:06) (98% - 98%)    CVS: S1S2   Respiratory: No wheezing, no crepitations  GI: Abdomen soft, bowel sounds positive  Musculoskeletal:  moves all extremities  : Voiding        Chief complaint  Patient is a 70y old  Male who presents with a chief complaint of sob (14 Apr 2025 11:12)         Labs and Fingersticks  CAPILLARY BLOOD GLUCOSE      POCT Blood Glucose.: 87 mg/dL (14 Apr 2025 17:54)  POCT Blood Glucose.: 112 mg/dL (14 Apr 2025 12:40)  POCT Blood Glucose.: 152 mg/dL (14 Apr 2025 09:11)  POCT Blood Glucose.: 194 mg/dL (13 Apr 2025 21:33)      Anion Gap: 28 *H* (04-14 @ 14:15)  Anion Gap: 25 *H* (04-14 @ 06:39)  Anion Gap: 21 *H* (04-13 @ 06:25)      Calcium: 10.0 (04-14 @ 14:15)  Calcium: 9.5 (04-14 @ 06:39)  Calcium: 9.8 (04-13 @ 06:25)          04-14    134[L]  |  83[L]  |  129[H]  ----------------------------<  86  3.4[L]   |  23  |  4.88[H]    Ca    10.0      14 Apr 2025 14:15  Phos  5.8     04-13  Mg     2.8     04-14                          11.6   13.43 )-----------( 346      ( 13 Apr 2025 06:25 )             34.3     Medications  MEDICATIONS  (STANDING):  AQUAPHOR (petrolatum Ointment) 1 Application(s) Topical two times a day  aspirin  chewable 81 milliGRAM(s) Oral daily  dextrose 5%. 1000 milliLiter(s) (100 mL/Hr) IV Continuous <Continuous>  dextrose 5%. 1000 milliLiter(s) (50 mL/Hr) IV Continuous <Continuous>  dextrose 50% Injectable 25 Gram(s) IV Push once  dextrose 50% Injectable 12.5 Gram(s) IV Push once  dextrose 50% Injectable 25 Gram(s) IV Push once  glucagon  Injectable 1 milliGRAM(s) IntraMuscular once  hydrALAZINE 100 milliGRAM(s) Oral three times a day  insulin glargine Injectable (LANTUS) 9 Unit(s) SubCutaneous at bedtime  insulin lispro (ADMELOG) corrective regimen sliding scale   SubCutaneous three times a day before meals  insulin lispro (ADMELOG) corrective regimen sliding scale   SubCutaneous at bedtime  insulin lispro Injectable (ADMELOG) 6 Unit(s) SubCutaneous three times a day before meals  isosorbide   dinitrate Tablet (ISORDIL) 40 milliGRAM(s) Oral three times a day  metoprolol succinate  milliGRAM(s) Oral daily  Nephro-daniel 1 Tablet(s) Oral daily  ranolazine 1000 milliGRAM(s) Oral two times a day  rivaroxaban 15 milliGRAM(s) Oral with dinner  senna 2 Tablet(s) Oral at bedtime  sodium chloride 0.9% with potassium chloride 20 mEq/L 1000 milliLiter(s) (100 mL/Hr) IV Continuous <Continuous>      Physical Exam  General: Patient comfortable in bed   Vital Signs Last 12 Hrs  T(F): 98 (04-14-25 @ 11:06), Max: 98 (04-14-25 @ 11:06)  HR: 76 (04-14-25 @ 11:15) (76 - 94)  BP: 98/67 (04-14-25 @ 17:22) (95/65 - 116/67)  BP(mean): --  RR: 18 (04-14-25 @ 11:06) (18 - 18)  SpO2: 98% (04-14-25 @ 11:06) (98% - 98%)    CVS: S1S2   Respiratory: No wheezing, no crepitations  GI: Abdomen soft, bowel sounds positive  Musculoskeletal:  moves all extremities  : Voiding

## 2025-04-14 NOTE — PROGRESS NOTE ADULT - NUTRITIONAL ASSESSMENT
This patient has been assessed with a concern for Malnutrition and has been determined to have a diagnosis/diagnoses of Severe protein-calorie malnutrition.    This patient is being managed with:   Diet DASH/TLC-  Sodium & Cholesterol Restricted  Consistent Carbohydrate {No Snacks} (CSTCHO)  1500mL Fluid Restriction ([id number])  Supplement Feeding Modality:  Oral  Suplena Cans or Servings Per Day:  1       Frequency:  Daily  Entered: Mar 28 2025 11:27AM  
This patient has been assessed with a concern for Malnutrition and has been determined to have a diagnosis/diagnoses of Severe protein-calorie malnutrition.    This patient is being managed with:   Diet DASH/TLC-  Sodium & Cholesterol Restricted  Consistent Carbohydrate {No Snacks} (CSTCHO)  1500mL Fluid Restriction ([id number])  Supplement Feeding Modality:  Oral  Suplena Cans or Servings Per Day:  1       Frequency:  Daily  Entered: Mar 28 2025 11:27AM  
This patient has been assessed with a concern for Malnutrition and has been determined to have a diagnosis/diagnoses of Severe protein-calorie malnutrition.    This patient is being managed with:   Diet Consistent Carbohydrate w/Evening Snack-  1000mL Fluid Restriction ([id number])  Low Sodium  Supplement Feeding Modality:  Oral  Suplena Cans or Servings Per Day:  2       Frequency:  Daily  Entered: Apr 10 2025  2:28PM  
This patient has been assessed with a concern for Malnutrition and has been determined to have a diagnosis/diagnoses of Severe protein-calorie malnutrition.    This patient is being managed with:   Diet Consistent Carbohydrate w/Evening Snack-  1500mL Fluid Restriction ([id number])  Low Sodium  Supplement Feeding Modality:  Oral  Suplena Cans or Servings Per Day:  2       Frequency:  Daily  Entered: Apr 1 2025 11:53AM  
This patient has been assessed with a concern for Malnutrition and has been determined to have a diagnosis/diagnoses of Severe protein-calorie malnutrition.    This patient is being managed with:   Diet Consistent Carbohydrate w/Evening Snack-  1500mL Fluid Restriction ([id number])  Low Sodium  Supplement Feeding Modality:  Oral  Suplena Cans or Servings Per Day:  2       Frequency:  Daily  Entered: Apr 1 2025 11:53AM  
This patient has been assessed with a concern for Malnutrition and has been determined to have a diagnosis/diagnoses of Severe protein-calorie malnutrition.    This patient is being managed with:   Diet Consistent Carbohydrate w/Evening Snack-  1500mL Fluid Restriction ([id number])  Low Sodium  Supplement Feeding Modality:  Oral  Suplena Cans or Servings Per Day:  2       Frequency:  Daily  Entered: Apr 1 2025 11:53AM  
This patient has been assessed with a concern for Malnutrition and has been determined to have a diagnosis/diagnoses of Severe protein-calorie malnutrition.    This patient is being managed with:   Diet Consistent Carbohydrate w/Evening Snack-  1500mL Fluid Restriction ([id number])  Low Sodium  Supplement Feeding Modality:  Oral  Suplena Cans or Servings Per Day:  2       Frequency:  Daily  Entered: Apr 1 2025 11:53AM  
This patient has been assessed with a concern for Malnutrition and has been determined to have a diagnosis/diagnoses of Severe protein-calorie malnutrition.    This patient is being managed with:   Diet Consistent Carbohydrate w/Evening Snack-  1500mL Fluid Restriction ([id number])  Low Sodium  Supplement Feeding Modality:  Oral  Suplena Cans or Servings Per Day:  2       Frequency:  Daily  Entered: Apr 1 2025 11:53AM  
This patient has been assessed with a concern for Malnutrition and has been determined to have a diagnosis/diagnoses of Severe protein-calorie malnutrition.    This patient is being managed with:   Diet Consistent Carbohydrate w/Evening Snack-  1500mL Fluid Restriction ([id number])  Low Sodium  Supplement Feeding Modality:  Oral  Suplena Cans or Servings Per Day:  2       Frequency:  Daily  Entered: Apr 1 2025 11:53AM  
This patient has been assessed with a concern for Malnutrition and has been determined to have a diagnosis/diagnoses of Severe protein-calorie malnutrition.    This patient is being managed with:   Diet DASH/TLC-  Sodium & Cholesterol Restricted  Consistent Carbohydrate {No Snacks} (CSTCHO)  1500mL Fluid Restriction ([id number])  Supplement Feeding Modality:  Oral  Suplena Cans or Servings Per Day:  1       Frequency:  Daily  Entered: Mar 28 2025 11:27AM  
This patient has been assessed with a concern for Malnutrition and has been determined to have a diagnosis/diagnoses of Severe protein-calorie malnutrition.    This patient is being managed with:   Diet DASH/TLC-  Sodium & Cholesterol Restricted  Consistent Carbohydrate {No Snacks} (CSTCHO)  1500mL Fluid Restriction ([id number])  Supplement Feeding Modality:  Oral  Suplena Cans or Servings Per Day:  1       Frequency:  Daily  Entered: Mar 28 2025 11:27AM  
This patient has been assessed with a concern for Malnutrition and has been determined to have a diagnosis/diagnoses of Severe protein-calorie malnutrition.    This patient is being managed with:   Diet NPO after Midnight-     NPO Start Date: 03-Apr-2025   NPO Start Time: 23:59  Entered: Apr  3 2025  2:11PM    Diet Consistent Carbohydrate w/Evening Snack-  1500mL Fluid Restriction ([id number])  Low Sodium  Supplement Feeding Modality:  Oral  Suplena Cans or Servings Per Day:  2       Frequency:  Daily  Entered: Apr 1 2025 11:53AM  
This patient has been assessed with a concern for Malnutrition and has been determined to have a diagnosis/diagnoses of Severe protein-calorie malnutrition.    This patient is being managed with:   Diet Consistent Carbohydrate w/Evening Snack-  1000mL Fluid Restriction ([id number])  Low Sodium  Supplement Feeding Modality:  Oral  Suplena Cans or Servings Per Day:  2       Frequency:  Daily  Entered: Apr 10 2025  2:28PM  
This patient has been assessed with a concern for Malnutrition and has been determined to have a diagnosis/diagnoses of Severe protein-calorie malnutrition.    This patient is being managed with:   Diet Consistent Carbohydrate w/Evening Snack-  1500mL Fluid Restriction ([id number])  Low Sodium  Supplement Feeding Modality:  Oral  Suplena Cans or Servings Per Day:  2       Frequency:  Daily  Entered: Apr 1 2025 11:53AM  
This patient has been assessed with a concern for Malnutrition and has been determined to have a diagnosis/diagnoses of Severe protein-calorie malnutrition.    This patient is being managed with:   Diet Consistent Carbohydrate w/Evening Snack-  1500mL Fluid Restriction ([id number])  Low Sodium  Supplement Feeding Modality:  Oral  Suplena Cans or Servings Per Day:  2       Frequency:  Daily  Entered: Apr 1 2025 11:53AM    This patient has been assessed with a concern for Malnutrition and has been determined to have a diagnosis/diagnoses of Severe protein-calorie malnutrition.    This patient is being managed with:   Diet Consistent Carbohydrate w/Evening Snack-  1500mL Fluid Restriction ([id number])  Low Sodium  Supplement Feeding Modality:  Oral  Suplena Cans or Servings Per Day:  2       Frequency:  Daily  Entered: Apr 1 2025 11:53AM  
This patient has been assessed with a concern for Malnutrition and has been determined to have a diagnosis/diagnoses of Severe protein-calorie malnutrition.    This patient is being managed with:   Diet Consistent Carbohydrate w/Evening Snack-  1500mL Fluid Restriction ([id number])  Low Sodium  Supplement Feeding Modality:  Oral  Suplena Cans or Servings Per Day:  2       Frequency:  Daily  Entered: Apr 1 2025 11:53AM  
This patient has been assessed with a concern for Malnutrition and has been determined to have a diagnosis/diagnoses of Severe protein-calorie malnutrition.    This patient is being managed with:   Diet Consistent Carbohydrate w/Evening Snack-  1500mL Fluid Restriction ([id number])  Low Sodium  Supplement Feeding Modality:  Oral  Suplena Cans or Servings Per Day:  2       Frequency:  Daily  Entered: Apr 1 2025 11:53AM  
This patient has been assessed with a concern for Malnutrition and has been determined to have a diagnosis/diagnoses of Severe protein-calorie malnutrition.    This patient is being managed with:   Diet Consistent Carbohydrate w/Evening Snack-  1000mL Fluid Restriction ([id number])  Low Sodium  Supplement Feeding Modality:  Oral  Suplena Cans or Servings Per Day:  2       Frequency:  Daily  Entered: Apr 10 2025  2:28PM  
This patient has been assessed with a concern for Malnutrition and has been determined to have a diagnosis/diagnoses of Severe protein-calorie malnutrition.    This patient is being managed with:   Diet Consistent Carbohydrate w/Evening Snack-  1000mL Fluid Restriction ([id number])  Low Sodium  Supplement Feeding Modality:  Oral  Suplena Cans or Servings Per Day:  2       Frequency:  Daily  Entered: Apr 10 2025  2:28PM  
This patient has been assessed with a concern for Malnutrition and has been determined to have a diagnosis/diagnoses of Severe protein-calorie malnutrition.    This patient is being managed with:   Diet Consistent Carbohydrate w/Evening Snack-  1000mL Fluid Restriction ([id number])  Low Sodium  Supplement Feeding Modality:  Oral  Suplena Cans or Servings Per Day:  2       Frequency:  Daily  Entered: Apr 10 2025  2:28PM  
This patient has been assessed with a concern for Malnutrition and has been determined to have a diagnosis/diagnoses of Severe protein-calorie malnutrition.    This patient is being managed with:   Diet Consistent Carbohydrate w/Evening Snack-  1000mL Fluid Restriction ([id number])  Low Sodium  Supplement Feeding Modality:  Oral  Suplena Cans or Servings Per Day:  2       Frequency:  Daily  Entered: Apr 10 2025  2:28PM

## 2025-04-15 NOTE — PROVIDER CONTACT NOTE (CHANGE IN STATUS NOTIFICATION) - ASSESSMENT
Patient more lethargic A&Ox0-1 and lethargic, unable to answer questions. otherwise base line patient axox4, independent no s&s of chest pain or sob noted, vitals stable.
sternal rub performed with no stimuli.

## 2025-04-15 NOTE — PROVIDER CONTACT NOTE (CRITICAL VALUE NOTIFICATION) - SITUATION
69 yo M with a PMH of CAD, HTN, DM, CKD, CHF w/ reduced EF, referred to ED by Cardiology for worsening shortness of breath and bilateral leg swelling

## 2025-04-15 NOTE — PROVIDER CONTACT NOTE (CHANGE IN STATUS NOTIFICATION) - ACTION/TREATMENT ORDERED:
RRT called for AMS @ 19:56. Labs drawn and CTH NC ordered/obtained during rapid. right shoulder xray ordered and done.
RRT called and changed to code blue, pt intubated, 8 rounds of CPR done, medications administered. pt pronounced .

## 2025-04-15 NOTE — CONSULT NOTE ADULT - CONSULT REQUESTED DATE/TIME
26-Mar-2025 15:53
26-Mar-2025 18:28
31-Mar-2025 14:15
15-Apr-2025 01:45
27-Mar-2025 09:02
28-Mar-2025 13:24
27-Mar-2025 16:57

## 2025-04-15 NOTE — DISCHARGE NOTE FOR THE EXPIRED PATIENT - HOSPITAL COURSE
The patient, a 70-year-old male, has a history of heart failure, CAD (with an occluded dLAD), atrial fibrillation, type 2 diabetes, and stage 4 chronic kidney disease. He presented with shortness of breath and a left foot wound. Recent hemodynamic monitoring revealed elevated filling pressures and low cardiac output. He was  currently being treated with hydralazine, ISDN, Toprol XL, and diuretics (bumex recently held, metolazone given). The team is holding off on SGLT2-i due to PAD with ulcers and fluctuating creatinine. His renal function is worsening despite diuresis and was being monitored by nephrology and heart failure team. Pt RRT twice for chest pain and AMS.  Pt was on maxed dose of isordil and ranexa. He was on xarelto for Afib and ASA for CAD.  CT Head was negative for mental changes. RRT later escalated to code blue was called at 0640 am.  Pt was found unresponsive. He was immediately intubated. CPR was performed. Pt was pronounced at 0656 am on 4/15/25.   The patient, a 70-year-old male, has a history of heart failure, CAD (with an occluded dLAD), atrial fibrillation, type 2 diabetes, and stage 4 chronic kidney disease. He presented with shortness of breath and a left foot wound. Recent hemodynamic monitoring revealed elevated filling pressures and low cardiac output. He was  currently being treated with hydralazine, ISDN, Toprol XL, and diuretics (bumex recently held, metolazone given). The team is holding off on SGLT2-i due to PAD with ulcers and fluctuating creatinine. His renal function is worsening despite diuresis and was being monitored by nephrology and heart failure team. Pt RRT twice for chest pain and AMS.  Pt was on maxed dose of isordil and ranexa. He was on xarelto for Afib and ASA for CAD.  CT Head was negative for mental changes. RRT later escalated to code blue was called at 0640 am.  Pt was found unresponsive. He was immediately intubated. CPR was performed. Pt was pronounced at 0656 am on 4/15/25.    Patient unresponsive without pulse after multiple rounds of CPR during code blue .     On exam:  Physical exam showed patient in bed, unresponsive to verbal or noxious stimuli.  Pupils are fixed and dilated.  No spontaneous respirations.  Skin pale. Absent heart and breath sounds.  No palpable carotid or radial pulses.    Absent peripheral pulses.  Patient pronounced dead at 6:56 by MAR and RRT team.   Dr Mcdaniel notified .   Family notified, wife Elizabeth at bedside.   Autopsy   declined.  not an ME case.

## 2025-04-15 NOTE — AIRWAY PLACEMENT NOTE ADULT - AIRWAY COMMENTS:
Upon arrival to code, RT in mid attempt at intubation.  Successful intubation x1.
pt intubated atraumatically during code blue w/ Mac 3. Anesthesia at the bedside.

## 2025-04-15 NOTE — CONSULT NOTE ADULT - SUBJECTIVE AND OBJECTIVE BOX
Neurology - Consult Note    -  Spectra: [phone] ([facility]), [phone] ([facility])  -    HPI: Patient KRISTY COYLE is a 70y (1955) wo/man with a PMHx significant for HFrEF,,  CAD (hx of multiple stents, occluded 100% dLAD  not amenable to PCI, Afib  xarelto,    DM, CKD4, HTN, known PAD s/p recent LLE with PT and peroneal stent placement (1/23), c/o  sob/ c.c    L foot wound was admitted on 3/26 with SOB. Neurology consulted due to AMS       Review of Systems:  INCOMPLETE   CONSTITUTIONAL: No fevers or chills  EYES AND ENT: No visual changes or no throat pain   NECK: No pain or stiffness  RESPIRATORY: No hemoptysis or shortness of breath  CARDIOVASCULAR: No chest pain or palpitations  GASTROINTESTINAL: No melena or hematochezia  GENITOURINARY: No dysuria or hematuria  NEUROLOGICAL: +As stated in HPI above  SKIN: No itching, burning, rashes, or lesions   All other review of systems is negative unless indicated above.    Allergies:  atorvastatin (Muscle Pain (Severe))      PMHx/PSHx/Family Hx: As above, otherwise see below   Carotid Stenosis    Diabetes Mellitus    Neuropathy    Former smoker    CAD in native artery    Type 2 diabetes mellitus    Hyperlipidemia, unspecified hyperlipidemia type    Essential hypertension, hypertension with unspecified goal    Afib    Hematoma of leg    Stented coronary artery    CHF (congestive heart failure)        Social Hx:  No current use of tobacco, alcohol, or illicit drugs  Lives with ***    Medications:  MEDICATIONS  (STANDING):  AQUAPHOR (petrolatum Ointment) 1 Application(s) Topical two times a day  aspirin  chewable 81 milliGRAM(s) Oral daily  dextrose 5% + sodium chloride 0.45%. 1000 milliLiter(s) (70 mL/Hr) IV Continuous <Continuous>  dextrose 5%. 1000 milliLiter(s) (100 mL/Hr) IV Continuous <Continuous>  dextrose 5%. 1000 milliLiter(s) (50 mL/Hr) IV Continuous <Continuous>  dextrose 50% Injectable 25 Gram(s) IV Push once  dextrose 50% Injectable 12.5 Gram(s) IV Push once  dextrose 50% Injectable 25 Gram(s) IV Push once  glucagon  Injectable 1 milliGRAM(s) IntraMuscular once  hydrALAZINE 100 milliGRAM(s) Oral three times a day  insulin glargine Injectable (LANTUS) 9 Unit(s) SubCutaneous at bedtime  insulin lispro (ADMELOG) corrective regimen sliding scale   SubCutaneous three times a day before meals  insulin lispro (ADMELOG) corrective regimen sliding scale   SubCutaneous at bedtime  insulin lispro Injectable (ADMELOG) 6 Unit(s) SubCutaneous three times a day before meals  isosorbide   dinitrate Tablet (ISORDIL) 40 milliGRAM(s) Oral three times a day  metoprolol succinate  milliGRAM(s) Oral daily  Nephro-daniel 1 Tablet(s) Oral daily  ranolazine 1000 milliGRAM(s) Oral two times a day  rivaroxaban 15 milliGRAM(s) Oral with dinner  senna 2 Tablet(s) Oral at bedtime    MEDICATIONS  (PRN):  acetaminophen     Tablet .. 650 milliGRAM(s) Oral every 6 hours PRN Temp greater or equal to 38C (100.4F), Mild Pain (1 - 3)  aluminum hydroxide/magnesium hydroxide/simethicone Suspension 30 milliLiter(s) Oral every 4 hours PRN Dyspepsia  bisacodyl 5 milliGRAM(s) Oral every 12 hours PRN Constipation  dextrose Oral Gel 15 Gram(s) Oral once PRN Blood Glucose LESS THAN 70 milliGRAM(s)/deciliter  melatonin 3 milliGRAM(s) Oral at bedtime PRN Insomnia  ondansetron Injectable 4 milliGRAM(s) IV Push every 8 hours PRN Nausea and/or Vomiting      Vitals:  T(C): 36.7 (04-15-25 @ 00:41), Max: 36.8 (04-14-25 @ 19:17)  HR: 91 (04-15-25 @ 00:41) (52 - 96)  BP: 124/58 (04-15-25 @ 00:41) (95/65 - 125/82)  RR: 18 (04-15-25 @ 00:41) (18 - 18)  SpO2: 95% (04-15-25 @ 00:41) (94% - 99%)    Physical Examination: INCOMPLETE  General - NAD, pleasant, cooperative   Cardiovascular - Peripheral pulses palpable, no edema  Neurologic Exam:  Mental status - Awake, Alert, Oriented to person, place, and time. Speech fluent, repetition and naming intact. Follows simple and complex commands. Attention/concentration, recent and remote memory (including registration and recall), and fund of knowledge intact    Cranial nerves:  CN II: Visual fields are full to confrontation. Fundoscopic exam not done. Pupils are 4 mm and briskly reactive to light.   CN III, IV, VI: EOMI, no nystagmus, no ptosis  CN V: Facial sensation is intact to light touch in all 3 divisions bilaterally.  CN VII: Face is symmetric with normal eye closure and smile.  CN VII: Hearing is normal to rubbing fingers  CN IX, X: Palate elevates symmetrically. Phonation is normal.  CN XI: Head turning and shoulder shrug are intact  CN XII: Tongue is midline with normal movements and no atrophy.    Motor - Normal bulk and tone throughout. No pronator drift of out-stretched arms.  Strength testing          R/L:   Deltoid(C5) 5/5  Biceps(C6) 5/5   Triceps(C7) 5/5    Wrist Extension 5/5   Wrist Flexion (C8) 5/5    Interossei  (T1) 5/5     5/5                      R/L: Hip Flexion(L2/3) 5/5   Hip Extension (L4/5) 5/5  Knee Flexion (L4/5/S1) 5/5   Knee Extension (L3/4) 5/5  Dorsiflexion (L4/5) 5/5  Plantar Flexion (S1) 5/5  Sensation - Light touch,  pain, vibration and position sense intact throughout    DTR's -             Biceps      Triceps     Brachioradialis      Patellar    Ankle    Toes/plantar response  R             2+             2+                  2+                       2+            2+                 Down  L              2+             2+                 2+                        2+           2+                 Down    Coordination - Rapid alternating movements and fine finger movements are intact. There is no dysmetria on finger-to-nose and heel-knee-shin. There are no abnormal or extraneous movements. Romberg?    Gait and station - Posture is normal. Gait is steady with normal steps, base, arm swing, and turning. Heel and toe walking are normal. Tandem gait is normal       Labs:                        11.0   30.64 )-----------( 334      ( 14 Apr 2025 20:19 )             31.8     04-14    132[L]  |  85[L]  |  140[H]  ----------------------------<  100[H]  4.0   |  20[L]  |  4.90[H]    Ca    9.3      14 Apr 2025 21:49  Phos  9.2     04-14  Mg     2.8     04-14    TPro  7.1  /  Alb  3.9  /  TBili  1.6[H]  /  DBili  x   /  AST  30  /  ALT  16  /  AlkPhos  114  04-14    CAPILLARY BLOOD GLUCOSE      POCT Blood Glucose.: 170 mg/dL (15 Apr 2025 00:22)    LIVER FUNCTIONS - ( 14 Apr 2025 20:19 )  Alb: 3.9 g/dL / Pro: 7.1 g/dL / ALK PHOS: 114 U/L / ALT: 16 U/L / AST: 30 U/L / GGT: x             PT/INR - ( 14 Apr 2025 20:19 )   PT: 67.9 sec;   INR: 6.06 ratio         PTT - ( 14 Apr 2025 20:19 )  PTT:52.8 sec  CSF:                  Radiology:  CT Head No Cont:  (14 Apr 2025 20:37)     Neurology - Consult Note    -  Spectra: [phone] ([facility]), [phone] ([facility])  -    HPI: Patient KRISTY COYLE is a 70y (1955) man with a PMHx significant for HFrEF,,  CAD (hx of multiple stents, occluded 100% dLAD  not amenable to PCI, Afib  xarelto,    DM, CKD4, HTN, known PAD s/p recent LLE with PT and peroneal stent placement (1/23), c/o  sob/ c.c  L foot wound was admitted on 3/26 with SOB. Neurology consulted due to AMS.  RRT was called around 7:55 PM on April 14 due to lethargy and poor response and very poor verbal output.  CT head was done which was unremarkable.  Patient was unable to give any history, collateral history was taken from the primary team and the RN at the bedside and chart review.  Patient's WBC count was going high though the patient was afebrile and also renal function was deteriorating with rising creatinine and blood urea nitrogen level.  During the time of the encounter patient was moving all 4 limbs without any purpose and was not responding to calling by his name and was not answering question.  As per the primary team his mental status was gradually worsening since that admission but today it drastically got worse.        Review of Systems:    Unable to complete due to altered mental status of the patient  Allergies:  atorvastatin (Muscle Pain (Severe))      PMHx/PSHx/Family Hx: As above, otherwise see below   Carotid Stenosis    Diabetes Mellitus    Neuropathy    Former smoker    CAD in native artery    Type 2 diabetes mellitus    Hyperlipidemia, unspecified hyperlipidemia type    Essential hypertension, hypertension with unspecified goal    Afib    Hematoma of leg    Stented coronary artery    CHF (congestive heart failure)        Social Hx:  No current use of tobacco, alcohol, or illicit drugs  Lives with ***    Medications:  MEDICATIONS  (STANDING):  AQUAPHOR (petrolatum Ointment) 1 Application(s) Topical two times a day  aspirin  chewable 81 milliGRAM(s) Oral daily  dextrose 5% + sodium chloride 0.45%. 1000 milliLiter(s) (70 mL/Hr) IV Continuous <Continuous>  dextrose 5%. 1000 milliLiter(s) (100 mL/Hr) IV Continuous <Continuous>  dextrose 5%. 1000 milliLiter(s) (50 mL/Hr) IV Continuous <Continuous>  dextrose 50% Injectable 25 Gram(s) IV Push once  dextrose 50% Injectable 12.5 Gram(s) IV Push once  dextrose 50% Injectable 25 Gram(s) IV Push once  glucagon  Injectable 1 milliGRAM(s) IntraMuscular once  hydrALAZINE 100 milliGRAM(s) Oral three times a day  insulin glargine Injectable (LANTUS) 9 Unit(s) SubCutaneous at bedtime  insulin lispro (ADMELOG) corrective regimen sliding scale   SubCutaneous three times a day before meals  insulin lispro (ADMELOG) corrective regimen sliding scale   SubCutaneous at bedtime  insulin lispro Injectable (ADMELOG) 6 Unit(s) SubCutaneous three times a day before meals  isosorbide   dinitrate Tablet (ISORDIL) 40 milliGRAM(s) Oral three times a day  metoprolol succinate  milliGRAM(s) Oral daily  Nephro-daniel 1 Tablet(s) Oral daily  ranolazine 1000 milliGRAM(s) Oral two times a day  rivaroxaban 15 milliGRAM(s) Oral with dinner  senna 2 Tablet(s) Oral at bedtime    MEDICATIONS  (PRN):  acetaminophen     Tablet .. 650 milliGRAM(s) Oral every 6 hours PRN Temp greater or equal to 38C (100.4F), Mild Pain (1 - 3)  aluminum hydroxide/magnesium hydroxide/simethicone Suspension 30 milliLiter(s) Oral every 4 hours PRN Dyspepsia  bisacodyl 5 milliGRAM(s) Oral every 12 hours PRN Constipation  dextrose Oral Gel 15 Gram(s) Oral once PRN Blood Glucose LESS THAN 70 milliGRAM(s)/deciliter  melatonin 3 milliGRAM(s) Oral at bedtime PRN Insomnia  ondansetron Injectable 4 milliGRAM(s) IV Push every 8 hours PRN Nausea and/or Vomiting      Vitals:  T(C): 36.7 (04-15-25 @ 00:41), Max: 36.8 (04-14-25 @ 19:17)  HR: 91 (04-15-25 @ 00:41) (52 - 96)  BP: 124/58 (04-15-25 @ 00:41) (95/65 - 125/82)  RR: 18 (04-15-25 @ 00:41) (18 - 18)  SpO2: 95% (04-15-25 @ 00:41) (94% - 99%)    Physical Examination:   General - Unable to answer any question or follow any commands, lying in bed saturating well without any oxygen  Cardiovascular - Dressing in the left foot due to wound from peripheral vascular disease  Neurologic Exam:  Mental status - Awake, eyes were open spontaneously, no purposeful communication or verbal output, was making some grunting noises in response to noxious stimuli, not following any commands.    Cranial nerves:  Pupils are bilaterally equal reacting to light, face is symmetrical at rest, tongue in midline no atrophy or fasciculation, all other cranial nerves could not be fully evaluated due to altered mental status    Motor - Muscle bulk is decreased in all 4 limbs, tone is increased in all 4 limbs  Strength testing        Moves all 4 limbs equally against gravity in response to noxious stimuli    Sensation - Responded to pain in all 4 limbs  DTR's -             Biceps      Triceps     Brachioradialis      Patellar    Ankle    Toes/plantar response  R             2+             2+          2+                       2+            2+               Equivocal  L              2+             2+         2+                        2+           2+               Equivocal      Coordination - Unable to assess due to AMS.   Romberg- Not tested due to safety concern    Gait and station -Not tested due to safety concern      Labs:                        11.0   30.64 )-----------( 334      ( 14 Apr 2025 20:19 )             31.8     04-14    132[L]  |  85[L]  |  140[H]  ----------------------------<  100[H]  4.0   |  20[L]  |  4.90[H]    Ca    9.3      14 Apr 2025 21:49  Phos  9.2     04-14  Mg     2.8     04-14    TPro  7.1  /  Alb  3.9  /  TBili  1.6[H]  /  DBili  x   /  AST  30  /  ALT  16  /  AlkPhos  114  04-14    CAPILLARY BLOOD GLUCOSE      POCT Blood Glucose.: 170 mg/dL (15 Apr 2025 00:22)    LIVER FUNCTIONS - ( 14 Apr 2025 20:19 )  Alb: 3.9 g/dL / Pro: 7.1 g/dL / ALK PHOS: 114 U/L / ALT: 16 U/L / AST: 30 U/L / GGT: x             PT/INR - ( 14 Apr 2025 20:19 )   PT: 67.9 sec;   INR: 6.06 ratio         PTT - ( 14 Apr 2025 20:19 )  PTT:52.8 sec  CSF:                  Radiology:  CT Head No Cont:  (14 Apr 2025 20:37)  Slightly motion limited study.    No gross acute intracranial hemorrhage, mass effect, or shift of the   midline structures.    Mild chronic white matter microvascular type changes which overall   appears similar.

## 2025-04-15 NOTE — RAPID RESPONSE TEAM SUMMARY - NSSITUATIONBACKGROUNDRRT_GEN_ALL_CORE
70 M, h/o   HFrEF,,  CAD (hx of multiple stents, occluded 100% dLAD  not amenable to PCI, Afib  xarelto,    DM, CKD4, HTN, known PAD s/p recent LLE with PT and peroneal stent placement (1/23), c/o  sob/ c.c    L foot wound. Admitted on 3/26  
Situation and Background	70 M, h/o   HFrEF,,  CAD (hx of multiple stents, occluded 100% dLAD  not amenable to PCI, Afib  xarelto,    DM, CKD4, HTN, known PAD s/p recent LLE with PT and peroneal stent placement (1/23), c/o  sob/ c.c    L foot wound. Admitted on 3/26.

## 2025-04-15 NOTE — CHART NOTE - NSCHARTNOTEFT_GEN_A_CORE
Death Note: Patient unresponsive without pulse after multiple rounds of CPR during code blue .     On exam:  Physical exam showed patient in bed, unresponsive to verbal or noxious stimuli.  Pupils are fixed and dilated.  No spontaneous respirations.  Skin pale. Absent heart and breath sounds.  No palpable carotid or radial pulses.    Absent peripheral pulses.  Patient pronounced dead at 6:56.   Dr Mcdaniel notified .   Family notified wife Elizabeth at bedside.   Autopsy   declined.  not an ME case.      Ross rAiza PA-C    Dept of Medicine  [id number] Death Note: Patient unresponsive without pulse after multiple rounds of CPR during code blue .     On exam:  Physical exam showed patient in bed, unresponsive to verbal or noxious stimuli.  Pupils are fixed and dilated.  No spontaneous respirations.  Skin pale. Absent heart and breath sounds.  No palpable carotid or radial pulses.    Absent peripheral pulses.  Patient pronounced dead at 6:56 by MAR and RRT team.   Dr Mcdaniel notified .   Family notified, wife Elizabeth at bedside.   Autopsy   declined.  not an ME case.      Ross Ariza PA-C    Dept of Medicine  [id number]

## 2025-04-15 NOTE — AIRWAY PLACEMENT NOTE ADULT - POST AIRWAY PLACEMENT ASSESSMENT:
breath sounds bilateral/breath sounds equal
breath sounds bilateral/breath sounds equal/positive end tidal CO2 noted/chest excursion noted

## 2025-04-15 NOTE — RAPID RESPONSE TEAM SUMMARY - NSADDTLFINDINGSRRT_GEN_ALL_CORE
RRT called at 6:17am, upgraded to code blue at 6:19 am. Rhythm check showed PEA, compressions started, epi given. During code, ROSC was achieved x4 intermittently with 8 rounds of CPR; however, each time ROSC was achieved, pt would lose pulse shortly thereafter. Pt was intubated by anesthesia. Pt was also given EPI x7 and bicarb pushes x2. Unfortunately, patient passed away at 6:56 AM despite resuscitation efforts.

## 2025-04-15 NOTE — CONSULT NOTE ADULT - ASSESSMENT
ASSESSMENT       IMPRESSION     RECOMMENDATION       [] Continue infectious w/u by following up on Bcx and Ucx  [] Check TSH, T3/T4, B1, B6, B12, Vitamin D (25 Hydroxy), and Vitamin E,   [] Continue tx for underlying condition  [] if pt mental status does not resolve to baseline after continued support tx will consider other diagnostic modalities including further imaging MRI head w/wo contrast and/or EEG  [] Pt is at high risk for delirium, therefore, please have adequate sleeping environment for the patient, light on, curtains open, TV on during the day with regular stimulation and out of bed to chair if possible. During the night, close curtains, TV off, lights off, and minimize interruptions (limit blood draws and vital sign checks if possible). Regular interaction with patient with staff (introducing selves), frequent reorientation, and encouragement of visitors as allowed by hospital and unit policy.   [] if needed for sleep may administer melatonin 3mg  [] Minimize sedating medications as tolerated      Patient to be seen by team and attending. Note finalized upon attending attestation.  ASSESSMENT   69yo man with a PMHx significant for HFrEF,,  CAD (hx of multiple stents, occluded 100% dLAD  not amenable to PCI, Afib  xarelto,    DM, CKD4, HTN, known PAD s/p recent LLE with PT and peroneal stent placement (1/23), c/o  sob/ c.c    L foot wound was admitted on 3/26 with SOB. Neurology consulted due to AMS.  Physical exam the patient was nonfocal with inability to follow any command, no meaningful verbal output, but there was no focal neurological deficits.  Patient labs were showing worsening renal function with rising BUN (140 last result) and patient's WBC count was high with several other metabolic derangement.  CT head did not show any acute pathology.     IMPRESSION   Altered mental status likely due to toxic/metabolic encephalopathy, likely due to uremic encephalopathy from worsening renal function and very high BUN level, needs to rule out underlying infection as a triggering factor given his high WBC count.      RECOMMENDATION   [] Continue infectious w/u by following up on Bcx and Ucx  [] Check TSH, T3/T4, B1, B6, B12, Vitamin D (25 Hydroxy), and Vitamin E,   [] Continue tx for underlying condition as per primary team  [] Cardiology, Nephrology on board for CHF and ESRD  [] if pt mental status does not resolve to baseline after continued support tx will consider other diagnostic modalities including further imaging MRI head w/wo contrast and/or EEG  [] Pt is at high risk for delirium, therefore, please have adequate sleeping environment for the patient, light on, curtains open, TV on during the day with regular stimulation and out of bed to chair if possible. During the night, close curtains, TV off, lights off, and minimize interruptions (limit blood draws and vital sign checks if possible). Regular interaction with patient with staff (introducing selves), frequent reorientation, and encouragement of visitors as allowed by hospital and unit policy.   [] if needed for sleep may administer melatonin 3mg  [] Minimize sedating medications as tolerated      Patient to be seen by team and attending. Note finalized upon attending attestation.  ASSESSMENT   69yo man with a PMHx significant for HFrEF,,  CAD (hx of multiple stents, occluded 100% dLAD  not amenable to PCI, Afib  xarelto,    DM, CKD4, HTN, known PAD s/p recent LLE with PT and peroneal stent placement (1/23), c/o  sob/ c.c    L foot wound was admitted on 3/26 with SOB. Neurology consulted due to AMS.  Physical exam the patient was nonfocal with inability to follow any command, no meaningful verbal output, but there was no focal neurological deficits.  Patient labs were showing worsening renal function with rising BUN (140 last result) and patient's WBC count was high with several other metabolic derangement.  CT head did not show any acute pathology.     IMPRESSION   Altered mental status likely due to toxic/metabolic encephalopathy, likely due to uremic encephalopathy from worsening renal function and very high BUN level, needs to rule out underlying infection as a triggering factor given his high WBC count.      RECOMMENDATION   [] Continue infectious w/u by following up on UA, Bcx and Ucx  [] Check TSH, T3/T4, B1, B6, B12, Vitamin D (25 Hydroxy), and Vitamin E,   [] Continue tx for underlying condition as per primary team  [] Cardiology, Nephrology on board for CHF and ESRD  [] if pt mental status does not resolve to baseline after continued support tx will consider other diagnostic modalities including further imaging MRI head w/wo contrast and/or EEG  [] Pt is at high risk for delirium, therefore, please have adequate sleeping environment for the patient, light on, curtains open, TV on during the day with regular stimulation and out of bed to chair if possible. During the night, close curtains, TV off, lights off, and minimize interruptions (limit blood draws and vital sign checks if possible). Regular interaction with patient with staff (introducing selves), frequent reorientation, and encouragement of visitors as allowed by hospital and unit policy.   [] if needed for sleep may administer melatonin 3mg  [] Minimize sedating medications as tolerated  [] Aspiration and fall precaution      Patient to be seen by team and attending. Note finalized upon attending attestation.

## 2025-04-15 NOTE — PROVIDER CONTACT NOTE (CHANGE IN STATUS NOTIFICATION) - BACKGROUND
dx CHF exacerbation. PMH CKD, HTN, DM, Afib, CAD.
69 yo M with a PMH of CAD, HTN, DM, CKD, CHF w/ reduced EF, referred to ED by Cardiology for worsening shortness of breath and bilateral leg swelling

## 2025-04-15 NOTE — CHART NOTE - NSCHARTNOTESELECT_GEN_ALL_CORE
Chest Pain/Event Note
Heart Failure/Event Note
LE pain/Event Note
Nutrition Services
Nutrition Services
cath site check/Event Note
death note/Event Note
wound surgery/Event Note
Chest Pain/Event Note
Event Note
Heart Failure/Off Service Note
Wound Care Team Note:/Event Note
post RHC/MEMS/Event Note
s/p RRT/Event Note

## 2025-04-22 NOTE — PATIENT PROFILE ADULT - HOME ACCESSIBILITY CONCERNS
Refill Routing Note   Medication(s) are not appropriate for processing by [facility] for the following reason(s):        Drug-disease interaction    [facility] action(s):  Defer      Medication Therapy Plan: Gout; Chronic gout without tophus, unspecified cause, unspecified site    Pharmacist review requested: Yes     Appointments  past 12m or future 3m with PCP    Date Provider   Last Visit   4/17/2025 Manohar Baker MD   Next Visit   Visit date not found Manohar Baker MD   ED visits in past 90 days: 0        Note composed:8:38 AM 04/22/2025                           none

## 2025-04-24 LAB
HGB FLD-MCNC: 13.9 G/DL — SIGNIFICANT CHANGE UP (ref 12.6–17.4)
OXYHGB MFR BLDMV: 56.2 % — SIGNIFICANT CHANGE UP
SAO2 % BLD: 57.5 % — LOW (ref 60–90)

## 2025-05-13 NOTE — PROGRESS NOTE ADULT - PROBLEM/PLAN-1
----- Message from Leanne CLAY sent at 5/13/2025  9:04 AM EDT -----  Regarding: diabetic eye  05/13/25 9:04 AM    Hello, our patient No patient name on file. has had Diabetic Eye Exam completed/performed. Please assist in updating the patient chart by making an External outreach to [facility] [phone] facility located in [place]. The date of service is per pt about 2 months ago.    Thank you,  Leanne PARTIDA  
DISPLAY PLAN FREE TEXT

## 2025-05-23 NOTE — ED PROVIDER NOTE - NS_BEDUNITTYPES_ED_ALL_ED
[Alert] : alert [Normal Voice/Communication] : normal voice/communication [Healthy Appearing] : healthy appearing [No Acute Distress] : no acute distress [Sclera] : the sclera and conjunctiva were normal [Hearing Threshold Finger Rub Not [place]] : hearing was normal [Normal Lips/Gums] : the lips and gums were normal [Oropharynx] : the oropharynx was normal [Normal Appearance] : the appearance of the neck was normal [No Neck Mass] : no neck mass was observed [No Respiratory Distress] : no respiratory distress [No Acc Muscle Use] : no accessory muscle use [Respiration, Rhythm And Depth] : normal respiratory rhythm and effort [Auscultation Breath Sounds / Voice Sounds] : lungs were clear to auscultation bilaterally [Heart Rate And Rhythm] : heart rate was normal and rhythm regular [Normal S1, S2] : normal S1 and S2 [Murmurs] : no murmurs [Bowel Sounds] : normal bowel sounds [No Masses] : no abdominal mass palpated [Abdomen Soft] : soft [] : no hepatosplenomegaly [Other: ___] : [unfilled] [Oriented To Time, Place, And Person] : oriented to person, place, and time TELEMETRY

## 2025-07-07 NOTE — PROGRESS NOTE ADULT - ASSESSMENT
68y Male PMH IDDM, Afib (on AC), HFpEF, CAD s/p multiple PCIs in past p/w CP to ED found to have NSTEMI s/p C successful POBA of pLAD and pLCx stent restenoses via RRA and RFA 11/27      - R wrist and R groin site stable- no hematoma  - Continue DAPT (aspirin 81mg and clopidogrel 75mg) x 12 months  - Can resume Xarelto tonight 11/28--> as per Dr. Dominguez, discontinue aspirin 81mg after 1 week- patient to then continue clopidogrel and Xarelto  - Continue atorvastatin 80mg daily  - HFpEF - EF 50% on BB and ANRI--> f/u general cardiology  - Medication adherence stressed to patient with discussion of risks of non-compliance   - Recommend a heart healthy diet which includes a variety of fruits and vegetables, whole grains, low fat dairy products, legumes and skinless poultry and fish; food prepared with little or no salt and minimize processed foods  - Avoid using NSAIDs  (Aleve, Motrin, ibuprofen, naproxen) while on DAPT, please utilize Tylenol for pain control (not to exceed 4gm in 24 hours)  - Care per primary team  - outpatient follow up with Dr. Jack as outpatient in 2 weeks upon discharge from the hospital   Date of Service:  7/1/2025    Chief Complaint:  Low Testosterone    Independent Historian:  No    History of Present Illness:       The patient presents for a review of his testosterone levels.    He has been using tadalafil as an alternative to sildenafil, which he finds more effective. He takes one tablet daily and occasionally uses an additional tablet prior to sexual activity. This regimen appears to be working well for him. He has not attempted to increase the dosage to two extra tablets.    MEDICATIONS  Tadalafil       PSA Test Results:  Lab Results   Component Value Date    PSA 1.66 02/03/2025    PSA 1.01 02/12/2018    PSA 0.83 05/10/2012       Past Medical History:   Past Medical History:   Diagnosis Date    Adenomatous colon polyp     Diabetes mellitus  (CMD)     Glucose intolerance (impaired glucose tolerance)     HTN (hypertension)     Hyperlipidemia     Sleep apnea     DOES NOT USE CPAP       Surgical History:  Past Surgical History:   Procedure Laterality Date    Colonoscopy remove lesions by snare  05/23/2011    Foot surgery      Hernia repair      Knee surgery      Nasal scopy,open maxill sinus  04/05/2022    Tonsillectomy and adenoidectomy      Vasectomy     :    Family History:  Family History   Problem Relation Age of Onset    Diabetes Mother     Diabetes Father     Cancer Father         prostate ca    Rheumatologic Disease Sister         Raynauds, SLE       Social History:  Social History     Socioeconomic History    Marital status: /Civil Union     Spouse name: Not on file    Number of children: Not on file    Years of education: Not on file    Highest education level: Not on file   Occupational History    Occupation: driving bus     Comment: first student   Tobacco Use    Smoking status: Never    Smokeless tobacco: Never   Vaping Use    Vaping status: never used   Substance and Sexual Activity    Alcohol use: Yes     Alcohol/week: 0.0 standard drinks of alcohol     Comment: occasionally     Drug use: No    Sexual activity: Not on file   Other Topics Concern    Not on file   Social History Narrative    Not on file     Social Drivers of Health     Financial Resource Strain: Not on file   Food Insecurity: Not on file   Transportation Needs: Not on file   Physical Activity: Not on file   Stress: Not on file   Social Connections: Not on file   Feeling safe in your relationship: Low Risk  (4/9/2024)    Interpersonal Safety     How often physically hurt: Never     How often insulted or talked down to: Never     How often threatened with harm: Never     How often scream or curse at: Never       Allergies:  ALLERGIES:  No Known Allergies    Medications:  Current Outpatient Medications   Medication Sig Dispense Refill    tadalafil (CIALIS) 10 MG tablet Take 1 tablet by mouth daily. May add one additional tablet daily as needed. NOT TO EXCEED 20 MG IN 24 HOUR PERIOD 90 tablet 3    dulaglutide (Trulicity) 4.5 MG/0.5ML pen-injector INJECT 4.5 MG INTO THE SKIN EVERY 7 DAYS. INDICATIONS: TYPE 2 DIABETES 2 mL 1    sildenafil (VIAGRA) 50 MG tablet Take 1 tablet by mouth daily as needed for Erectile Dysfunction. 10 tablet 1    lisinopril (ZESTRIL) 20 MG tablet Take 1 tablet by mouth daily. 30 tablet 3    empagliflozin (Jardiance) 25 MG tablet Take 1 tablet by mouth daily (before breakfast). 30 tablet 11    sucralfate (CARAFATE) 1 g tablet Take 1 tablet by mouth 4 times daily. 28 tablet 0    spironolactone (ALDACTONE) 25 MG tablet TAKE 1 TABLET BY MOUTH DAILY. 90 tablet 1    furosemide (LASIX) 40 MG tablet TAKE 1 TABLET BY MOUTH DAILY. INDICATIONS: EDEMA, HEART FAILURE FOLLOW UP NEEDED 90 tablet 1    bisoprolol (ZEBETA) 5 MG tablet TAKE 1 TABLET BY MOUTH DAILY. 90 tablet 1    cyclobenzaprine (FLEXERIL) 10 MG tablet Take 1 tablet by mouth 3 times daily as needed for Muscle spasms. Do not drive, operate machinery, or take alcohol. Increased risk of sedation (Patient not taking: Reported on 2/26/2025) 30 tablet 0     blood glucose (ONE TOUCH ULTRA TEST) test strip Test blood sugar 4 times daily as directed. Diagnosis: E11.9. Meter: One Touch Ultra 2 400 each 5    fluticasone (FLONASE) 50 MCG/ACT nasal spray Spray 2 sprays in each nostril daily for 10 days. 1 each 0    albuterol 108 (90 Base) MCG/ACT inhaler Inhale 2 puffs into the lungs every 4 hours as needed for Wheezing. 1 each 1    Spacer/Aero-Holding Chambers Device For use with inhaler 1 each 0    ondansetron (ZOFRAN) 4 MG tablet Take 1 tablet by mouth as directed. 2 tablet 0    aspirin 81 MG chewable tablet Chew 1 tablet by mouth daily. Do not start before September 24, 2021.      Blood Glucose Monitoring Suppl (ONE TOUCH ULTRA 2) w/Device Kit Use to test blood sugars 4 times daily 1 kit 0    ONE TOUCH LANCETS Misc Use to test blood sugars 4 times daily 400 each 5     No current facility-administered medications for this visit.       Allergies, Medications, Past Medical History, Past Surgical History, Family History, and Social History were reviewed today and changes are as noted above.    Physical Exam:  Constitutional:  Visit Vitals  BP (!) 189/108   Pulse 97   Ht 6' 1\" (1.854 m)   Wt 112.5 kg (248 lb)   BMI 32.72 kg/m²     Well developed, well nourished, and afebrile.    Eyes:  Conjunctivae and lids are normal.  Pupils and irises are equal and reactive to light.    Gastrointestinal:  Abdomen soft, nontender, nondistended.     Skin:  Warm and dry with no lesions or induration.      Record Review:    I met with patient, [place] [facility], and obtained pertinent history and exam.    Tests Reviewed:  Testosterone  Tests Ordered:  Testosterone    The records were reviewed, as above. In summary, labs from 6/24/25 show: Total testosterone before 10 .9, SHBG 46, free testosterone 53, percent free testosterone 1.5%      Assessment and Plan:       Erectile dysfunction: His testosterone level is at the lower end of the normal range, with a reading of 346.  Discussed the  variability in the normal range of testosterone levels. His percent free testosterone is low at 1.5% and could likely benefit from supplementation. The goal is to increase his free testosterone level to above 150 to enhance the effectiveness of tadalafil or Viagra. However, his blood pressure is elevated today. Testosterone replacement therapy will not be initiated until his blood pressure is better controlled due to the risk of erythrocytosis with testosterone replacement and elevated BP today. He reports he has been under a lot of stress.  He has been advised to consult his primary care physician regarding the addition of a blood pressure medication or lifestyle modifications. Once his blood pressure is under control, he can schedule an appointment to discuss testosterone replacement therapy. Various forms of testosterone replacement were discussed, including subcutaneous injections, creams, and pellets. The risks and benefits of each form were explained. Injections are preferred due to better dosing control, but creams and pellets are also options if he is needle-averse. The cost and insurance coverage for these treatments were also discussed.     Follow up once BP normalizes to discuss TRT. May need repeat testosterone panel prior to follow up.  Otherwise follow up in 1 year to maintain oral tadalafil.         MARIA R Treviño  [facility]

## 2025-07-21 NOTE — ED ADULT NURSE NOTE - NURSING MUSC ROM
Pre-Operative Instructions    1. The night before your surgery, unless otherwise instructed, do not eat any food, drink any liquids, chew gum or mints after midnight. Abstain from alcohol for 24 hours prior to surgery.     2. You will be contacted by the Hospital the working day prior to your procedure to confirm your arrival time.     3. Patients under 18 years of age must have a parent or legal guardian present to sign their consent and discharge paperwork.     4. On the day of surgery,  you will be seen pre-operatively by an anesthesiologist.     5. If you are having hand surgery, it is recommended that nail polish and acrylic nails be removed prior to surgery if possible.     6. Please bring cases for glasses, contact lenses, hearing aids or dentures. They will likely be removed prior to surgery.     7. Wear casual, loose-fitting and comfortable clothing. Consider that you may have a large dressing to fit under your clothing after surgery.     9. Please do not bring valuables such as jewelry or large sums of cash to the hospital. Remove all body piercings before coming to the hospital. You may not  wear any rings on the hand if you are having surgery on that hand, wrist or elbow.     10. Do not smoke or chew tobacco before your surgery. All [facility] and surgery facilities are smoke-free environments. Smoking is not permitted anywhere on campus.     11. Be sure to follow any additional instructions from your physician.       If the above conditions are not met, your surgery may be cancelled and rescheduled for another day.      Should you develop any change in your health such as fever, cough, sore throat, cold, flu, or infection, or if you have any questions regarding your Pre-admission or surgery, please contact [facility] - Surgery Scheduling at [phone], Monday through Friday, 9 a.m. to 5 p.m.      full range of motion in all extremities

## 2025-09-16 ENCOUNTER — APPOINTMENT (OUTPATIENT)
Dept: VASCULAR SURGERY | Facility: CLINIC | Age: 70
End: 2025-09-16

## (undated) DEVICE — BRUSH COLONOSCOPY CYTOLOGY

## (undated) DEVICE — DRSG TEGADERM 1.75X1.75"

## (undated) DEVICE — BLADE SCALPEL SAFETYLOCK #11

## (undated) DEVICE — DRAPE INSTRUMENT POUCH 6.75" X 11"

## (undated) DEVICE — DRAIN RESERVOIR FOR JACKSON PRATT 100CC CARDINAL

## (undated) DEVICE — DRAPE LIGHT HANDLE COVER (BLUE)

## (undated) DEVICE — SOL IRR BAG NS 0.9% 1000ML

## (undated) DEVICE — SUT SURGIPRO II 4-0 18" P-12

## (undated) DEVICE — SOL IRR POUR H2O 250ML

## (undated) DEVICE — SUCTION YANKAUER NO CONTROL VENT

## (undated) DEVICE — DRAPE SPLIT SHEET 77" X 108"

## (undated) DEVICE — TUBING SUCTION CONN 6FT STERILE

## (undated) DEVICE — BITE BLOCK ADULT 20 X 27MM (GREEN)

## (undated) DEVICE — SYR CONTRAST 10ML

## (undated) DEVICE — POLY TRAP ETRAP

## (undated) DEVICE — DRSG STOCKINETTE IMPERVIOUS MED

## (undated) DEVICE — ELCTR BOVIE PENCIL SMOKE EVACUATION

## (undated) DEVICE — BLADE SCALPEL SAFETYLOCK #10

## (undated) DEVICE — POSITIONER FOAM EGG CRATE ULNAR 2PCS (PINK)

## (undated) DEVICE — BALLOON US ENDO

## (undated) DEVICE — TAPE MARKING RADIOPAQ GLOW 55CM

## (undated) DEVICE — SOL IRR POUR NS 0.9% 500ML

## (undated) DEVICE — FOLEY HOLDER STATLOCK 2 WAY ADULT

## (undated) DEVICE — TUBING SUCTION 20FT

## (undated) DEVICE — MARKING PEN W RULER

## (undated) DEVICE — Device

## (undated) DEVICE — DRAPE TOWEL BLUE 17" X 24"

## (undated) DEVICE — DRSG TEGADERM 6 X 8"

## (undated) DEVICE — FOLEY TRAY 16FR 5CC LTX UMETER CLOSED

## (undated) DEVICE — LIGASURE SMALL JAW

## (undated) DEVICE — PACK EXTREMITY

## (undated) DEVICE — DRSG TEGADERM 6"X8"

## (undated) DEVICE — DRSG STERISTRIPS 0.5 X 4"

## (undated) DEVICE — BLADE SCALPEL SAFETYLOCK #15

## (undated) DEVICE — DRAPE FEMORAL ANGIOGRAPHY W TROUGH

## (undated) DEVICE — TUBING CONTRAST INJECTION HIGH PRESSURE 1200PSI 72"

## (undated) DEVICE — VENODYNE/SCD SLEEVE CALF MEDIUM

## (undated) DEVICE — STAPLER SKIN VISI-STAT 35 WIDE

## (undated) DEVICE — SOL INJ NS 0.9% 500ML 2 PORT

## (undated) DEVICE — DRAPE MAYO STAND 30"

## (undated) DEVICE — DRSG ADAPTIC 3 X 8"

## (undated) DEVICE — TUBING HIGH POWER CONTRAST INJ

## (undated) DEVICE — SUT BIOSYN 4-0 18" P-12

## (undated) DEVICE — PACK IV START WITH CHG

## (undated) DEVICE — CLAMP BX HOT RAD JAW 3

## (undated) DEVICE — DRAPE IOBAN 23" X 23"

## (undated) DEVICE — DRSG KLING 4"

## (undated) DEVICE — GOWN TRIMAX LG

## (undated) DEVICE — SENSOR O2 FINGER ADULT

## (undated) DEVICE — TUBING IV SET GRAVITY 3Y 100" MACRO

## (undated) DEVICE — DRAPE 1/2 SHEET 40X57"

## (undated) DEVICE — IRRIGATOR BIO SHIELD

## (undated) DEVICE — DRAPE EQUIPMENT BANDED BAG 30 X 30" (SHOWER CAP)

## (undated) DEVICE — BIOPSY FORCEP RADIAL JAW 4 STANDARD WITH NEEDLE

## (undated) DEVICE — INFLATION DEVICE BASIXCOMPAK

## (undated) DEVICE — VISITEC 4X4

## (undated) DEVICE — GLV 7 PROTEXIS (WHITE)

## (undated) DEVICE — WARMING BLANKET UPPER ADULT

## (undated) DEVICE — DRSG CURITY GAUZE SPONGE 4 X 4" 12-PLY

## (undated) DEVICE — FORCEP RADIAL JAW 4 JUMBO 2.8MM 3.2MM 240CM ORANGE DISP

## (undated) DEVICE — CONN DUAL HOSE

## (undated) DEVICE — GLV 7.5 PROTEXIS (WHITE)

## (undated) DEVICE — LAP PAD 18 X 18"

## (undated) DEVICE — DRSG COMBINE 5X9"

## (undated) DEVICE — SYR LUER LOK 20CC

## (undated) DEVICE — DRAPE 3/4 SHEET W REINFORCEMENT 56X77"

## (undated) DEVICE — SPECIMEN CONTAINER 100ML

## (undated) DEVICE — SUT PROLENE 7-0 24" BV175-6

## (undated) DEVICE — DRAIN JACKSON PRATT 10MM FLAT FULL NO TROCAR

## (undated) DEVICE — SPEAR SURG EYE WECK-CELL CELOS

## (undated) DEVICE — TAPE GLO-N-TELL RADIOPAQUE 20 STRIPS

## (undated) DEVICE — NDL PERC BASEPLT 18GX7CM

## (undated) DEVICE — TORQUE DEVICE FOR GUIDEWIRE 0.0100.038"

## (undated) DEVICE — ELCTR 4-DISC 20MM 49" (RED, BLUE, GREEN, BLACK)

## (undated) DEVICE — PACK GENERAL MINOR

## (undated) DEVICE — GOWN XL

## (undated) DEVICE — NDL COUNTER FOAM AND MAGNET 40-70

## (undated) DEVICE — DRSG DERMABOND MINI

## (undated) DEVICE — ELCTR BOVIE PENCIL HANDPIECE

## (undated) DEVICE — DRSG STOCKINETTE IMPERVIOUS XL

## (undated) DEVICE — DRSG OPSITE 13.75 X 4"

## (undated) DEVICE — DRSG VAC WHITEFOAM LARGE (WHITE)

## (undated) DEVICE — MEDICATION LABELS W MARKER

## (undated) DEVICE — PREP CHLORAPREP HI-LITE ORANGE 26ML

## (undated) DEVICE — DRSG XEROFORM 5 X 9"

## (undated) DEVICE — DRSG COBAN 4"

## (undated) DEVICE — PACK BASIN SPECIAL PROCEDURE

## (undated) DEVICE — WARMING BLANKET LOWER ADULT

## (undated) DEVICE — NDL ENTRY PERC MCKNIGHT 18G

## (undated) DEVICE — DRAIN BLAKE 15FR BARD CHANNEL

## (undated) DEVICE — ELCTR GROUNDING PAD ADULT COVIDIEN

## (undated) DEVICE — SYR ALLIANCE II INFLATION 60ML

## (undated) DEVICE — SUT POLYSORB 3-0 30" V-20 UNDYED

## (undated) DEVICE — CATH IV SAFE BC 22G X 1" (BLUE)

## (undated) DEVICE — SYR LUER LOK 50CC

## (undated) DEVICE — GAMMA SLEEVE DISPOSABLE

## (undated) DEVICE — CATH IV SAFE BC 20G X 1.16" (PINK)